# Patient Record
Sex: MALE | Race: BLACK OR AFRICAN AMERICAN | NOT HISPANIC OR LATINO | URBAN - METROPOLITAN AREA
[De-identification: names, ages, dates, MRNs, and addresses within clinical notes are randomized per-mention and may not be internally consistent; named-entity substitution may affect disease eponyms.]

---

## 2017-09-10 ENCOUNTER — INPATIENT (INPATIENT)
Facility: HOSPITAL | Age: 47
LOS: 52 days | Discharge: NURSING, REHAB OR LONG-TERM CARE | End: 2017-11-02
Attending: THORACIC SURGERY (CARDIOTHORACIC VASCULAR SURGERY) | Admitting: THORACIC SURGERY (CARDIOTHORACIC VASCULAR SURGERY)
Payer: MEDICAID

## 2017-09-10 ENCOUNTER — EMERGENCY (EMERGENCY)
Facility: HOSPITAL | Age: 47
LOS: 1 days | Discharge: HOME | End: 2017-09-10
Attending: EMERGENCY MEDICINE | Admitting: EMERGENCY MEDICINE
Payer: MEDICAID

## 2017-09-10 VITALS
RESPIRATION RATE: 22 BRPM | OXYGEN SATURATION: 98 % | SYSTOLIC BLOOD PRESSURE: 151 MMHG | HEART RATE: 100 BPM | DIASTOLIC BLOOD PRESSURE: 109 MMHG | TEMPERATURE: 98 F

## 2017-09-10 VITALS
OXYGEN SATURATION: 97 % | RESPIRATION RATE: 22 BRPM | TEMPERATURE: 99 F | DIASTOLIC BLOOD PRESSURE: 100 MMHG | HEART RATE: 85 BPM | SYSTOLIC BLOOD PRESSURE: 150 MMHG

## 2017-09-10 VITALS
HEART RATE: 82 BPM | SYSTOLIC BLOOD PRESSURE: 127 MMHG | RESPIRATION RATE: 18 BRPM | DIASTOLIC BLOOD PRESSURE: 94 MMHG | OXYGEN SATURATION: 97 %

## 2017-09-10 LAB
ALBUMIN SERPL ELPH-MCNC: 4.2 G/DL — SIGNIFICANT CHANGE UP (ref 3.3–5)
ALP SERPL-CCNC: 68 U/L — SIGNIFICANT CHANGE UP (ref 40–120)
ALT FLD-CCNC: 16 U/L — SIGNIFICANT CHANGE UP (ref 4–41)
AST SERPL-CCNC: 21 U/L — SIGNIFICANT CHANGE UP (ref 4–40)
BASE EXCESS BLDV CALC-SCNC: 1.6 MMOL/L — SIGNIFICANT CHANGE UP
BASOPHILS # BLD AUTO: 0.01 K/UL — SIGNIFICANT CHANGE UP (ref 0–0.2)
BASOPHILS # BLD AUTO: 0.04 K/UL — SIGNIFICANT CHANGE UP (ref 0–0.2)
BASOPHILS NFR BLD AUTO: 0.2 % — SIGNIFICANT CHANGE UP (ref 0–2)
BASOPHILS NFR BLD AUTO: 0.6 % — SIGNIFICANT CHANGE UP (ref 0–2)
BILIRUB SERPL-MCNC: 0.4 MG/DL — SIGNIFICANT CHANGE UP (ref 0.2–1.2)
BLOOD GAS VENOUS - CREATININE: 1.06 MG/DL — SIGNIFICANT CHANGE UP (ref 0.5–1.3)
BUN SERPL-MCNC: 14 MG/DL — SIGNIFICANT CHANGE UP (ref 7–23)
BUN SERPL-MCNC: 15 MG/DL — SIGNIFICANT CHANGE UP (ref 7–23)
CALCIUM SERPL-MCNC: 9.2 MG/DL — SIGNIFICANT CHANGE UP (ref 8.4–10.5)
CALCIUM SERPL-MCNC: 9.8 MG/DL — SIGNIFICANT CHANGE UP (ref 8.4–10.5)
CHLORIDE BLDV-SCNC: 103 MMOL/L — SIGNIFICANT CHANGE UP (ref 96–108)
CHLORIDE SERPL-SCNC: 100 MMOL/L — SIGNIFICANT CHANGE UP (ref 98–107)
CHLORIDE SERPL-SCNC: 99 MMOL/L — SIGNIFICANT CHANGE UP (ref 98–107)
CO2 SERPL-SCNC: 23 MMOL/L — SIGNIFICANT CHANGE UP (ref 22–31)
CO2 SERPL-SCNC: 28 MMOL/L — SIGNIFICANT CHANGE UP (ref 22–31)
CREAT SERPL-MCNC: 1.06 MG/DL — SIGNIFICANT CHANGE UP (ref 0.5–1.3)
CREAT SERPL-MCNC: 1.21 MG/DL — SIGNIFICANT CHANGE UP (ref 0.5–1.3)
EOSINOPHIL # BLD AUTO: 0 K/UL — SIGNIFICANT CHANGE UP (ref 0–0.5)
EOSINOPHIL # BLD AUTO: 0.19 K/UL — SIGNIFICANT CHANGE UP (ref 0–0.5)
EOSINOPHIL NFR BLD AUTO: 0 % — SIGNIFICANT CHANGE UP (ref 0–6)
EOSINOPHIL NFR BLD AUTO: 2.9 % — SIGNIFICANT CHANGE UP (ref 0–6)
GAS PNL BLDV: 138 MMOL/L — SIGNIFICANT CHANGE UP (ref 136–146)
GLUCOSE BLDV-MCNC: 149 — HIGH (ref 70–99)
GLUCOSE SERPL-MCNC: 145 MG/DL — HIGH (ref 70–99)
GLUCOSE SERPL-MCNC: 92 MG/DL — SIGNIFICANT CHANGE UP (ref 70–99)
HCO3 BLDV-SCNC: 25 MMOL/L — SIGNIFICANT CHANGE UP (ref 20–27)
HCT VFR BLD CALC: 41 % — SIGNIFICANT CHANGE UP (ref 39–50)
HCT VFR BLD CALC: 42.4 % — SIGNIFICANT CHANGE UP (ref 39–50)
HCT VFR BLDV CALC: 43.6 % — SIGNIFICANT CHANGE UP (ref 39–51)
HGB BLD-MCNC: 13.4 G/DL — SIGNIFICANT CHANGE UP (ref 13–17)
HGB BLD-MCNC: 13.8 G/DL — SIGNIFICANT CHANGE UP (ref 13–17)
HGB BLDV-MCNC: 14.2 G/DL — SIGNIFICANT CHANGE UP (ref 13–17)
IMM GRANULOCYTES # BLD AUTO: 0.01 # — SIGNIFICANT CHANGE UP
IMM GRANULOCYTES # BLD AUTO: 0.01 # — SIGNIFICANT CHANGE UP
IMM GRANULOCYTES NFR BLD AUTO: 0.2 % — SIGNIFICANT CHANGE UP (ref 0–1.5)
IMM GRANULOCYTES NFR BLD AUTO: 0.2 % — SIGNIFICANT CHANGE UP (ref 0–1.5)
LACTATE BLDV-MCNC: 1.7 MMOL/L — SIGNIFICANT CHANGE UP (ref 0.5–2)
LYMPHOCYTES # BLD AUTO: 0.88 K/UL — LOW (ref 1–3.3)
LYMPHOCYTES # BLD AUTO: 1.75 K/UL — SIGNIFICANT CHANGE UP (ref 1–3.3)
LYMPHOCYTES # BLD AUTO: 15.9 % — SIGNIFICANT CHANGE UP (ref 13–44)
LYMPHOCYTES # BLD AUTO: 26.3 % — SIGNIFICANT CHANGE UP (ref 13–44)
MAGNESIUM SERPL-MCNC: 1.8 MG/DL — SIGNIFICANT CHANGE UP (ref 1.6–2.6)
MCHC RBC-ENTMCNC: 28.1 PG — SIGNIFICANT CHANGE UP (ref 27–34)
MCHC RBC-ENTMCNC: 28.9 PG — SIGNIFICANT CHANGE UP (ref 27–34)
MCHC RBC-ENTMCNC: 32.5 % — SIGNIFICANT CHANGE UP (ref 32–36)
MCHC RBC-ENTMCNC: 32.7 % — SIGNIFICANT CHANGE UP (ref 32–36)
MCV RBC AUTO: 86.4 FL — SIGNIFICANT CHANGE UP (ref 80–100)
MCV RBC AUTO: 88.6 FL — SIGNIFICANT CHANGE UP (ref 80–100)
MONOCYTES # BLD AUTO: 0.03 K/UL — SIGNIFICANT CHANGE UP (ref 0–0.9)
MONOCYTES # BLD AUTO: 0.68 K/UL — SIGNIFICANT CHANGE UP (ref 0–0.9)
MONOCYTES NFR BLD AUTO: 0.5 % — LOW (ref 2–14)
MONOCYTES NFR BLD AUTO: 10.2 % — SIGNIFICANT CHANGE UP (ref 2–14)
NEUTROPHILS # BLD AUTO: 3.98 K/UL — SIGNIFICANT CHANGE UP (ref 1.8–7.4)
NEUTROPHILS # BLD AUTO: 4.6 K/UL — SIGNIFICANT CHANGE UP (ref 1.8–7.4)
NEUTROPHILS NFR BLD AUTO: 59.8 % — SIGNIFICANT CHANGE UP (ref 43–77)
NEUTROPHILS NFR BLD AUTO: 83.2 % — HIGH (ref 43–77)
NRBC # FLD: 0 — SIGNIFICANT CHANGE UP
NRBC # FLD: 0 — SIGNIFICANT CHANGE UP
PCO2 BLDV: 44 MMHG — SIGNIFICANT CHANGE UP (ref 41–51)
PH BLDV: 7.39 PH — SIGNIFICANT CHANGE UP (ref 7.32–7.43)
PLATELET # BLD AUTO: 141 K/UL — LOW (ref 150–400)
PLATELET # BLD AUTO: 146 K/UL — LOW (ref 150–400)
PMV BLD: 12.2 FL — SIGNIFICANT CHANGE UP (ref 7–13)
PMV BLD: 12.5 FL — SIGNIFICANT CHANGE UP (ref 7–13)
PO2 BLDV: 59 MMHG — HIGH (ref 35–40)
POTASSIUM BLDV-SCNC: 4.4 MMOL/L — SIGNIFICANT CHANGE UP (ref 3.4–4.5)
POTASSIUM SERPL-MCNC: 4.5 MMOL/L — SIGNIFICANT CHANGE UP (ref 3.5–5.3)
POTASSIUM SERPL-MCNC: 4.7 MMOL/L — SIGNIFICANT CHANGE UP (ref 3.5–5.3)
POTASSIUM SERPL-SCNC: 4.5 MMOL/L — SIGNIFICANT CHANGE UP (ref 3.5–5.3)
POTASSIUM SERPL-SCNC: 4.7 MMOL/L — SIGNIFICANT CHANGE UP (ref 3.5–5.3)
PROT SERPL-MCNC: 9.1 G/DL — HIGH (ref 6–8.3)
RBC # BLD: 4.63 M/UL — SIGNIFICANT CHANGE UP (ref 4.2–5.8)
RBC # BLD: 4.91 M/UL — SIGNIFICANT CHANGE UP (ref 4.2–5.8)
RBC # FLD: 12.9 % — SIGNIFICANT CHANGE UP (ref 10.3–14.5)
RBC # FLD: 12.9 % — SIGNIFICANT CHANGE UP (ref 10.3–14.5)
SAO2 % BLDV: 88.7 % — HIGH (ref 60–85)
SODIUM SERPL-SCNC: 138 MMOL/L — SIGNIFICANT CHANGE UP (ref 135–145)
SODIUM SERPL-SCNC: 139 MMOL/L — SIGNIFICANT CHANGE UP (ref 135–145)
TROPONIN T SERPL-MCNC: < 0.06 NG/ML — SIGNIFICANT CHANGE UP (ref 0–0.06)
TROPONIN T SERPL-MCNC: < 0.06 NG/ML — SIGNIFICANT CHANGE UP (ref 0–0.06)
WBC # BLD: 5.53 K/UL — SIGNIFICANT CHANGE UP (ref 3.8–10.5)
WBC # BLD: 6.65 K/UL — SIGNIFICANT CHANGE UP (ref 3.8–10.5)
WBC # FLD AUTO: 5.53 K/UL — SIGNIFICANT CHANGE UP (ref 3.8–10.5)
WBC # FLD AUTO: 6.65 K/UL — SIGNIFICANT CHANGE UP (ref 3.8–10.5)

## 2017-09-10 PROCEDURE — 70496 CT ANGIOGRAPHY HEAD: CPT | Mod: 26

## 2017-09-10 PROCEDURE — 99285 EMERGENCY DEPT VISIT HI MDM: CPT

## 2017-09-10 PROCEDURE — 71020: CPT | Mod: 26

## 2017-09-10 PROCEDURE — 70498 CT ANGIOGRAPHY NECK: CPT | Mod: 26,52

## 2017-09-10 RX ORDER — AZITHROMYCIN 500 MG/1
1 TABLET, FILM COATED ORAL
Qty: 5 | Refills: 0 | OUTPATIENT
Start: 2017-09-10 | End: 2017-09-15

## 2017-09-10 RX ORDER — DEXAMETHASONE 0.5 MG/5ML
10 ELIXIR ORAL ONCE
Qty: 0 | Refills: 0 | Status: COMPLETED | OUTPATIENT
Start: 2017-09-10 | End: 2017-09-10

## 2017-09-10 RX ORDER — METOCLOPRAMIDE HCL 10 MG
10 TABLET ORAL ONCE
Qty: 0 | Refills: 0 | Status: COMPLETED | OUTPATIENT
Start: 2017-09-10 | End: 2017-09-10

## 2017-09-10 RX ADMIN — Medication 10 MILLIGRAM(S): at 23:27

## 2017-09-10 RX ADMIN — Medication 10 MILLIGRAM(S): at 12:15

## 2017-09-10 NOTE — ED PROVIDER NOTE - CONSTITUTIONAL, MLM
normal... moderate discomfort, well nourished, awake, alert, oriented to person, place, time/situation

## 2017-09-10 NOTE — ED PROVIDER NOTE - NS ED ROS FT
+lightheadedness, weakness, migrating chest parasthesias, restless legs, cough, myalgias, severe headache  No CP, sob, fevers, chills, abd pain, leg swelling

## 2017-09-10 NOTE — ED PROVIDER NOTE - PMH
Asthma    COPD (chronic obstructive pulmonary disease)    History of pneumothorax  History of 3 prior pneumothoraces.  Hypertension    Sarcoidosis

## 2017-09-10 NOTE — ED PROVIDER NOTE - NEUROLOGICAL, MLM
Alert and oriented, truncal ataxia, no motor or sensory deficits. CN2-12 intact, normal speech and tone

## 2017-09-10 NOTE — ED PROVIDER NOTE - PROGRESS NOTE DETAILS
PHILLIP: 47M with copd/sarcoid, discharged from ed today for presyncope/bronchitis returns to ed with generalized feeling unwell and headache; - headache is 10/10; no photophobia/nuchal rigity/. + n, + feeling tremulous all over; no sick contracts/recent travel; PE: NAD, airway patent, +S1S2, no mrg, abdomen soft, nt nd, normal finger to nose; very gaurded gait; ecg: twave inversions v1-v3 - no prior to compare. a/p: 47m with sarcoid/copd p/w vomiting and severe headache, r/o ich/subarachoid with ct head and check for aneursysms with cta, neuro c/s; r/o acs as well- will add on ce to old and new labs, re-assess pt denies n, continues to deny chest pain; serial ecg shows what looks like new developing inversions laterally,  will get card c/s; awaiting final input from neuro as well;

## 2017-09-10 NOTE — ED ADULT NURSE NOTE - OBJECTIVE STATEMENT
pt received spot 16. pt A+Ox3 with hx of COPD (not 02 dependant) and sarcoidosis c/o increased sob x 3 days. states has had URI symptoms and believes triggered an attack. reports upon EMS arrival 02 sat was 89% on RA. received 2 neb treatments by ems and endorses improved resp effort. denies sob at present. medicated as ordered. vs as noted. IVSL in place. labs sent. will monitor.

## 2017-09-10 NOTE — ED PROVIDER NOTE - OBJECTIVE STATEMENT
47 year old male with history of sarcoidosis p/w multiple medical complaints. Patient came to ED earlier today with presyncopal episode/history of productive cough with bloody sputum. Was d/c'd with azithromycin for bronchitis and received steroid dose in the ED. Returning to ED now feeling worse with lightheadedness, weakness, lower leg shaking 47 year old male with history of sarcoidosis p/w multiple medical complaints. Patient came to ED earlier today with presyncopal episode/history of productive cough with bloody sputum. Was d/c'd with azithromycin for bronchitis and received steroid dose in the ED. Returning to ED now feeling worse with lightheadedness, weakness, lower leg shaking. Patient is now complaining of severe, sudden headache that began at approximately 9pm. Described as constant and the worst headache of his life. No nausea/vomiting, abd pain, vision changes, fevers, chills, leg swelling. No other complaints.

## 2017-09-10 NOTE — ED PROVIDER NOTE - MEDICAL DECISION MAKING DETAILS
47M history of sarcoidosis p/w severe headache, weakness, lightheadedness, ?presyncopal event.  EKG shows t wave inversions in precordial leads v1-v3. Negative delta trops. Admit to tele for new ekg changes. CTA head/neck negative for aneurysm/bleed

## 2017-09-10 NOTE — ED PROVIDER NOTE - PROGRESS NOTE DETAILS
Torrez: labs unremarkable.  pt states steroids improve sx significantly.  speaking in full sentences clearly.  rr 18.  Dx bronchitis.  will give z pack 5 days with prednisone given pt sarcoid hx. ok to use albuterol pump prn.  left ambulatory.  f/u with pcp and return if sx worsens.

## 2017-09-10 NOTE — ED PROVIDER NOTE - OBJECTIVE STATEMENT
47 yr old male with hx of sarcoidosis, COPD from 911 presents to ed c/o uri sx past 2 days with muscle spasm that last a few seconds.  Pt denies any cp, palpitations, n/v, fever, rash, abd pain.  pt denies any smoking or drug use. no leg swelling.  pt got concern because of the arm spasm without loc.  admits to using his albuterol pump frequently.

## 2017-09-10 NOTE — ED PROVIDER NOTE - MEDICAL DECISION MAKING DETAILS
47 yr old male with hx of sarcoidosis, COPD from 911 presents to ed c/o uri sx past 2 days with muscle spasm that last a few seconds.  Pt denies any cp, palpitations, n/v, fever, rash, abd pain.  pt denies any smoking or drug use. no leg swelling.  pt got concern because of the arm spasm without loc.  admits to using his albuterol pump frequently.    uri now with bronchitis vs COPD exacerbation - muscle spasm likely overuse of albuterol.  will obtain labs, cxr, decadron and dc with abx 47 yr old male with hx of sarcoidosis, COPD from 911 presents to ed c/o uri sx past 2 days with muscle spasm that last a few seconds.  Pt denies any cp, palpitations, n/v, fever, rash, abd pain.  pt denies any smoking or drug use. no leg swelling.  pt got concern because of the arm spasm without loc.  admits to using his albuterol pump frequently.    uri now with bronchitis vs COPD exacerbation - muscle spasm likely overuse of albuterol.  will obtain labs, cxr, decadron and dc with abx.  no concern for cardiac based on hx

## 2017-09-10 NOTE — ED PROVIDER NOTE - ATTENDING CONTRIBUTION TO CARE
pauline: hx from pt and his wife.   today pt was driving his car and he started shaking (unwitnessed). Came to ED (see note) and was given zpack for copd/cough worsening. At home, developed a severe diffuse headache along with fatigue, witnessed shaking of extremities for several minutes while awake, and feeling of pins and needles over body. denies any chest pain or cardiac hx. Pt was at [facility] and has COPD/sarcoid; followed at [facility].   exam: c/o headache and shaking to rt leg. alert, oriented. no resp distress.   neck supple with nontender and full rom. few crackles to bilateral posterior lung bases.   ht, abd unremarkable. neuro: str 5/5. refl 3/4 bilateral, no clonus.  nl finger to nose. pt cannot walk unassisted due to poor balance.   EKG: deeply inverted t waves v1-v3.   impression:   headache/diff walking: will obtain CT head; neurology consult.   abnormal EKG: old EKG na. if new, may be CNS, acute cardiac.   recc: cardiac monitor; troponin sent for current and earlier visit labs. if Ct head nl, will consult cardiology; current EKG not meeting acute STEMi criteria. pauline: hx from pt and his wife.   today pt was driving his car and he started shaking (unwitnessed). Came to ED (see note) and was given zpack for copd/cough worsening. At home, developed a severe diffuse headache along with fatigue, witnessed shaking of extremities for several minutes while awake, and feeling of pins and needles over body. denies any chest pain or cardiac hx. Pt was at [facility] and has COPD/sarcoid; followed at [facility].   exam: c/o headache and shaking to rt leg. alert, oriented. no resp distress.   neck supple with nontender and full rom. few crackles to bilateral posterior lung bases.   ht, abd unremarkable. neuro: str 5/5. refl 3/4 bilateral, no clonus.  nl finger to nose. pt cannot walk unassisted due to poor balance.   EKG: deeply inverted t waves v1-v3.   impression:   headache/diff walking: will obtain CT head; neurology consult.   abnormal EKG: old EKG na. if new, may be CNS, acute cardiac.   recc: cardiac monitor; troponin sent for current and earlier visit labs. if Ct head nl, will consult cardiology; current EKG not meeting acute STEMi criteria..

## 2017-09-10 NOTE — ED PROVIDER NOTE - CHIEF COMPLAINT
The patient is a 47y Male complaining of The patient is a 47y Male complaining of multiple medical complaints

## 2017-09-10 NOTE — ED ADULT TRIAGE NOTE - CHIEF COMPLAINT QUOTE
p/t seen in ED this afternoon c/o of feeling nausea, syncopal episode,  weak, flushed after taking a Zithromax, p/t appears diaphoretic directly taken to room for eval

## 2017-09-10 NOTE — ED ADULT NURSE NOTE - OBJECTIVE STATEMENT
Pt to bed 22. Pt from home. Family member states while at home pt started to have "seizure like" activity. No trauma noted. Pt c/o abd "numbness" and HA. Pt hypertensive. Pt hooked up to CM. IVL placed. Bloods drawn. Safety maintained. Will continue to monitor.

## 2017-09-10 NOTE — ED ADULT TRIAGE NOTE - CHIEF COMPLAINT QUOTE
p/t with hx sarcoidosis and chest tubes c/o of cough, wheezing and sob for past few days denies any chest pain 2 neb treatments given by EMS

## 2017-09-11 DIAGNOSIS — Z29.9 ENCOUNTER FOR PROPHYLACTIC MEASURES, UNSPECIFIED: ICD-10-CM

## 2017-09-11 DIAGNOSIS — R51 HEADACHE: ICD-10-CM

## 2017-09-11 DIAGNOSIS — R55 SYNCOPE AND COLLAPSE: ICD-10-CM

## 2017-09-11 DIAGNOSIS — B34.8 OTHER VIRAL INFECTIONS OF UNSPECIFIED SITE: ICD-10-CM

## 2017-09-11 DIAGNOSIS — J44.1 CHRONIC OBSTRUCTIVE PULMONARY DISEASE WITH (ACUTE) EXACERBATION: ICD-10-CM

## 2017-09-11 DIAGNOSIS — J44.9 CHRONIC OBSTRUCTIVE PULMONARY DISEASE, UNSPECIFIED: ICD-10-CM

## 2017-09-11 DIAGNOSIS — I10 ESSENTIAL (PRIMARY) HYPERTENSION: ICD-10-CM

## 2017-09-11 DIAGNOSIS — D86.9 SARCOIDOSIS, UNSPECIFIED: ICD-10-CM

## 2017-09-11 LAB
AMPHET UR-MCNC: NEGATIVE — SIGNIFICANT CHANGE UP
APAP SERPL-MCNC: < 15 UG/ML — LOW (ref 15–25)
APPEARANCE UR: CLEAR — SIGNIFICANT CHANGE UP
B PERT DNA SPEC QL NAA+PROBE: SIGNIFICANT CHANGE UP
BARBITURATES MEASUREMENT: NEGATIVE — SIGNIFICANT CHANGE UP
BARBITURATES UR SCN-MCNC: NEGATIVE — SIGNIFICANT CHANGE UP
BENZODIAZ SERPL-MCNC: NEGATIVE — SIGNIFICANT CHANGE UP
BENZODIAZ UR-MCNC: NEGATIVE — SIGNIFICANT CHANGE UP
BILIRUB UR-MCNC: NEGATIVE — SIGNIFICANT CHANGE UP
BLOOD UR QL VISUAL: NEGATIVE — SIGNIFICANT CHANGE UP
BUN SERPL-MCNC: 15 MG/DL — SIGNIFICANT CHANGE UP (ref 7–23)
C PNEUM DNA SPEC QL NAA+PROBE: NOT DETECTED — SIGNIFICANT CHANGE UP
CALCIUM SERPL-MCNC: 9.4 MG/DL — SIGNIFICANT CHANGE UP (ref 8.4–10.5)
CANNABINOIDS UR-MCNC: NEGATIVE — SIGNIFICANT CHANGE UP
CHLORIDE SERPL-SCNC: 99 MMOL/L — SIGNIFICANT CHANGE UP (ref 98–107)
CHOLEST SERPL-MCNC: 201 MG/DL — HIGH (ref 120–199)
CK MB BLD-MCNC: 1.84 NG/ML — SIGNIFICANT CHANGE UP (ref 1–6.6)
CK MB BLD-MCNC: SIGNIFICANT CHANGE UP (ref 0–2.5)
CK SERPL-CCNC: 144 U/L — SIGNIFICANT CHANGE UP (ref 30–200)
CO2 SERPL-SCNC: 26 MMOL/L — SIGNIFICANT CHANGE UP (ref 22–31)
COCAINE METAB.OTHER UR-MCNC: NEGATIVE — SIGNIFICANT CHANGE UP
COLOR SPEC: YELLOW — SIGNIFICANT CHANGE UP
CREAT SERPL-MCNC: 1.06 MG/DL — SIGNIFICANT CHANGE UP (ref 0.5–1.3)
ETHANOL BLD-MCNC: < 10 MG/DL — SIGNIFICANT CHANGE UP
FLUAV H1 2009 PAND RNA SPEC QL NAA+PROBE: NOT DETECTED — SIGNIFICANT CHANGE UP
FLUAV H1 RNA SPEC QL NAA+PROBE: NOT DETECTED — SIGNIFICANT CHANGE UP
FLUAV H3 RNA SPEC QL NAA+PROBE: NOT DETECTED — SIGNIFICANT CHANGE UP
FLUAV SUBTYP SPEC NAA+PROBE: SIGNIFICANT CHANGE UP
FLUBV RNA SPEC QL NAA+PROBE: NOT DETECTED — SIGNIFICANT CHANGE UP
GLUCOSE SERPL-MCNC: 121 MG/DL — HIGH (ref 70–99)
GLUCOSE UR-MCNC: NEGATIVE — SIGNIFICANT CHANGE UP
HADV DNA SPEC QL NAA+PROBE: NOT DETECTED — SIGNIFICANT CHANGE UP
HCOV 229E RNA SPEC QL NAA+PROBE: NOT DETECTED — SIGNIFICANT CHANGE UP
HCOV HKU1 RNA SPEC QL NAA+PROBE: NOT DETECTED — SIGNIFICANT CHANGE UP
HCOV NL63 RNA SPEC QL NAA+PROBE: NOT DETECTED — SIGNIFICANT CHANGE UP
HCOV OC43 RNA SPEC QL NAA+PROBE: NOT DETECTED — SIGNIFICANT CHANGE UP
HCT VFR BLD CALC: 41 % — SIGNIFICANT CHANGE UP (ref 39–50)
HDLC SERPL-MCNC: 100 MG/DL — HIGH (ref 35–55)
HGB BLD-MCNC: 13.3 G/DL — SIGNIFICANT CHANGE UP (ref 13–17)
HMPV RNA SPEC QL NAA+PROBE: NOT DETECTED — SIGNIFICANT CHANGE UP
HPIV1 RNA SPEC QL NAA+PROBE: NOT DETECTED — SIGNIFICANT CHANGE UP
HPIV2 RNA SPEC QL NAA+PROBE: NOT DETECTED — SIGNIFICANT CHANGE UP
HPIV3 RNA SPEC QL NAA+PROBE: NOT DETECTED — SIGNIFICANT CHANGE UP
HPIV4 RNA SPEC QL NAA+PROBE: NOT DETECTED — SIGNIFICANT CHANGE UP
KETONES UR-MCNC: SIGNIFICANT CHANGE UP
LEUKOCYTE ESTERASE UR-ACNC: NEGATIVE — SIGNIFICANT CHANGE UP
LIPID PNL WITH DIRECT LDL SERPL: 113 MG/DL — SIGNIFICANT CHANGE UP
M PNEUMO DNA SPEC QL NAA+PROBE: NOT DETECTED — SIGNIFICANT CHANGE UP
MCHC RBC-ENTMCNC: 28.2 PG — SIGNIFICANT CHANGE UP (ref 27–34)
MCHC RBC-ENTMCNC: 32.4 % — SIGNIFICANT CHANGE UP (ref 32–36)
MCV RBC AUTO: 86.9 FL — SIGNIFICANT CHANGE UP (ref 80–100)
METHADONE UR-MCNC: NEGATIVE — SIGNIFICANT CHANGE UP
MUCOUS THREADS # UR AUTO: SIGNIFICANT CHANGE UP
NITRITE UR-MCNC: NEGATIVE — SIGNIFICANT CHANGE UP
NRBC # FLD: 0 — SIGNIFICANT CHANGE UP
OPIATES UR-MCNC: NEGATIVE — SIGNIFICANT CHANGE UP
OXYCODONE UR-MCNC: NEGATIVE — SIGNIFICANT CHANGE UP
PCP UR-MCNC: NEGATIVE — SIGNIFICANT CHANGE UP
PH UR: 6 — SIGNIFICANT CHANGE UP (ref 4.6–8)
PLATELET # BLD AUTO: 155 K/UL — SIGNIFICANT CHANGE UP (ref 150–400)
PMV BLD: 12.8 FL — SIGNIFICANT CHANGE UP (ref 7–13)
POTASSIUM SERPL-MCNC: 4.3 MMOL/L — SIGNIFICANT CHANGE UP (ref 3.5–5.3)
POTASSIUM SERPL-SCNC: 4.3 MMOL/L — SIGNIFICANT CHANGE UP (ref 3.5–5.3)
PROT UR-MCNC: 10 — SIGNIFICANT CHANGE UP
RBC # BLD: 4.72 M/UL — SIGNIFICANT CHANGE UP (ref 4.2–5.8)
RBC # FLD: 13 % — SIGNIFICANT CHANGE UP (ref 10.3–14.5)
RBC CASTS # UR COMP ASSIST: SIGNIFICANT CHANGE UP (ref 0–?)
RSV RNA SPEC QL NAA+PROBE: NOT DETECTED — SIGNIFICANT CHANGE UP
RV+EV RNA SPEC QL NAA+PROBE: POSITIVE — HIGH
SALICYLATES SERPL-MCNC: < 5 MG/DL — LOW (ref 15–30)
SODIUM SERPL-SCNC: 140 MMOL/L — SIGNIFICANT CHANGE UP (ref 135–145)
SP GR SPEC: 1.03 — SIGNIFICANT CHANGE UP (ref 1–1.03)
TRIGL SERPL-MCNC: 40 MG/DL — SIGNIFICANT CHANGE UP (ref 10–149)
TROPONIN T SERPL-MCNC: < 0.06 NG/ML — SIGNIFICANT CHANGE UP (ref 0–0.06)
TSH SERPL-MCNC: 1.69 UIU/ML — SIGNIFICANT CHANGE UP (ref 0.27–4.2)
UROBILINOGEN FLD QL: NORMAL E.U. — SIGNIFICANT CHANGE UP (ref 0.1–0.2)
WBC # BLD: 10.25 K/UL — SIGNIFICANT CHANGE UP (ref 3.8–10.5)
WBC # FLD AUTO: 10.25 K/UL — SIGNIFICANT CHANGE UP (ref 3.8–10.5)
WBC UR QL: SIGNIFICANT CHANGE UP (ref 0–?)

## 2017-09-11 PROCEDURE — 99223 1ST HOSP IP/OBS HIGH 75: CPT

## 2017-09-11 PROCEDURE — 71250 CT THORAX DX C-: CPT | Mod: 26

## 2017-09-11 PROCEDURE — 99222 1ST HOSP IP/OBS MODERATE 55: CPT | Mod: GC

## 2017-09-11 RX ORDER — AZITHROMYCIN 500 MG/1
500 TABLET, FILM COATED ORAL DAILY
Qty: 0 | Refills: 0 | Status: COMPLETED | OUTPATIENT
Start: 2017-09-11 | End: 2017-09-13

## 2017-09-11 RX ORDER — BACLOFEN 100 %
10 POWDER (GRAM) MISCELLANEOUS ONCE
Qty: 0 | Refills: 0 | Status: COMPLETED | OUTPATIENT
Start: 2017-09-11 | End: 2017-09-11

## 2017-09-11 RX ORDER — ENOXAPARIN SODIUM 100 MG/ML
40 INJECTION SUBCUTANEOUS EVERY 24 HOURS
Qty: 0 | Refills: 0 | Status: DISCONTINUED | OUTPATIENT
Start: 2017-09-11 | End: 2017-09-19

## 2017-09-11 RX ORDER — ATORVASTATIN CALCIUM 80 MG/1
80 TABLET, FILM COATED ORAL AT BEDTIME
Qty: 0 | Refills: 0 | Status: DISCONTINUED | OUTPATIENT
Start: 2017-09-12 | End: 2017-11-02

## 2017-09-11 RX ORDER — CLOPIDOGREL BISULFATE 75 MG/1
75 TABLET, FILM COATED ORAL DAILY
Qty: 0 | Refills: 0 | Status: DISCONTINUED | OUTPATIENT
Start: 2017-09-12 | End: 2017-09-12

## 2017-09-11 RX ORDER — ASPIRIN/CALCIUM CARB/MAGNESIUM 324 MG
81 TABLET ORAL DAILY
Qty: 0 | Refills: 0 | Status: DISCONTINUED | OUTPATIENT
Start: 2017-09-12 | End: 2017-09-12

## 2017-09-11 RX ORDER — ACETAMINOPHEN 500 MG
1000 TABLET ORAL ONCE
Qty: 0 | Refills: 0 | Status: COMPLETED | OUTPATIENT
Start: 2017-09-11 | End: 2017-09-11

## 2017-09-11 RX ORDER — ASPIRIN/CALCIUM CARB/MAGNESIUM 324 MG
324 TABLET ORAL ONCE
Qty: 0 | Refills: 0 | Status: COMPLETED | OUTPATIENT
Start: 2017-09-11 | End: 2017-09-11

## 2017-09-11 RX ORDER — LOSARTAN POTASSIUM 100 MG/1
25 TABLET, FILM COATED ORAL DAILY
Qty: 0 | Refills: 0 | Status: DISCONTINUED | OUTPATIENT
Start: 2017-09-11 | End: 2017-09-13

## 2017-09-11 RX ORDER — TIOTROPIUM BROMIDE 18 UG/1
1 CAPSULE ORAL; RESPIRATORY (INHALATION) DAILY
Qty: 0 | Refills: 0 | Status: DISCONTINUED | OUTPATIENT
Start: 2017-09-11 | End: 2017-09-13

## 2017-09-11 RX ORDER — IPRATROPIUM/ALBUTEROL SULFATE 18-103MCG
3 AEROSOL WITH ADAPTER (GRAM) INHALATION EVERY 6 HOURS
Qty: 0 | Refills: 0 | Status: DISCONTINUED | OUTPATIENT
Start: 2017-09-11 | End: 2017-09-12

## 2017-09-11 RX ORDER — BACLOFEN 100 %
10 POWDER (GRAM) MISCELLANEOUS
Qty: 0 | Refills: 0 | Status: DISCONTINUED | OUTPATIENT
Start: 2017-09-11 | End: 2017-09-12

## 2017-09-11 RX ORDER — BUDESONIDE AND FORMOTEROL FUMARATE DIHYDRATE 160; 4.5 UG/1; UG/1
2 AEROSOL RESPIRATORY (INHALATION)
Qty: 0 | Refills: 0 | Status: DISCONTINUED | OUTPATIENT
Start: 2017-09-11 | End: 2017-09-13

## 2017-09-11 RX ORDER — CLOPIDOGREL BISULFATE 75 MG/1
300 TABLET, FILM COATED ORAL ONCE
Qty: 0 | Refills: 0 | Status: COMPLETED | OUTPATIENT
Start: 2017-09-11 | End: 2017-09-11

## 2017-09-11 RX ADMIN — Medication 125 MILLIGRAM(S): at 08:28

## 2017-09-11 RX ADMIN — Medication 20 MILLIGRAM(S): at 14:53

## 2017-09-11 RX ADMIN — Medication 324 MILLIGRAM(S): at 04:16

## 2017-09-11 RX ADMIN — Medication 10 MILLIGRAM(S): at 08:39

## 2017-09-11 RX ADMIN — AZITHROMYCIN 500 MILLIGRAM(S): 500 TABLET, FILM COATED ORAL at 12:19

## 2017-09-11 RX ADMIN — Medication 400 MILLIGRAM(S): at 02:10

## 2017-09-11 RX ADMIN — Medication 1000 MILLIGRAM(S): at 04:16

## 2017-09-11 RX ADMIN — LOSARTAN POTASSIUM 25 MILLIGRAM(S): 100 TABLET, FILM COATED ORAL at 12:19

## 2017-09-11 RX ADMIN — ENOXAPARIN SODIUM 40 MILLIGRAM(S): 100 INJECTION SUBCUTANEOUS at 11:40

## 2017-09-11 RX ADMIN — Medication 10 MILLIGRAM(S): at 17:32

## 2017-09-11 RX ADMIN — CLOPIDOGREL BISULFATE 300 MILLIGRAM(S): 75 TABLET, FILM COATED ORAL at 04:16

## 2017-09-11 RX ADMIN — Medication 3 MILLILITER(S): at 08:28

## 2017-09-11 RX ADMIN — Medication 3 MILLILITER(S): at 17:32

## 2017-09-11 NOTE — H&P ADULT - ASSESSMENT
47 year old male with a PMHx of sarcoidosis, HTN, asthma, and COPD presents to the ED with severe leg spasms and headache leading to a presyncopal episode. 47 year old male with a PMHx of sarcoidosis, HTN, asthma, and COPD presents to the ED with severe leg spasms and headache leading to a presyncopal episode, found to have a significant abnormal EKG and moderate inspiratory and expiratory wheezing in the setting of Entero/Rhino virus, admitted to tele for Near Syncope, r/o ACS and COPD exacerbation.

## 2017-09-11 NOTE — H&P ADULT - NEGATIVE MUSCULOSKELETAL SYMPTOMS
no arm pain R/no arthralgia/no stiffness/no joint swelling/no back pain/no leg pain L/no myalgia/no neck pain/no arm pain L/no leg pain R

## 2017-09-11 NOTE — H&P ADULT - NEGATIVE GASTROINTESTINAL SYMPTOMS
no melena/no hematochezia/no jaundice/no abdominal pain/no nausea/no diarrhea/no vomiting/no constipation/no change in bowel habits

## 2017-09-11 NOTE — CONSULT NOTE ADULT - SUBJECTIVE AND OBJECTIVE BOX
HISTORY OF PRESENT ILLNESS:  Patient is a 47y old  Male who presents with a chief complaint of   HPI: 47 year old male with history of sarcoidosis p/w multiple medical complaints. Patient came to ED earlier yesterday with presyncopal episode/history of productive cough with bloody sputum. Was d/c'd with azithromycin for bronchitis and received steroid dose in the ED. At home, developed a severe diffuse headache along with fatigue, witnessed shaking of extremities for several minutes while awake, and feeling of pins and needles over body. Returning to ED now feeling worse with lightheadedness, weakness, lower leg shaking. Denies any chest pain or cardiac hx.  CT Angio Neck showed no acute intracranial hemorrhage, mass  effect, or CT evidence of an acute vascular territorial infarct. No  abnormal intracranial enhancement. CTA head: No significant stenosis or occlusion of the major proximal  arterial branches. No evidence of an intracranial arterial aneurysm. CTA NECK: No significant stenosis of the cervical carotid arteries based  on NASCET criteria. No significant stenosis of the cervical vertebral  arteries. Severe scarring and bronchiectasis in the lung apices and  mildly prominent right paratracheal lymph nodes. Cardiology consulted for dynamic EKG changes.     Allergies  No Known Allergies    MEDICATIONS  acetaminophen  IVPB. 1000 milliGRAM(s) IV Intermittent onc    PAST MEDICAL & SURGICAL HISTORY:    Sarcoidosis    FAMILY HISTORY:      SOCIAL HISTORY    Marital Status:   Occupation:   Lives with:     SUBSTANCE USE  Tobacco Usage:  ( ) never smoked   ( ) former smoker  ( ) current smoker; Packs per day:   Alcohol Usage: ( ) none  ( ) occasional ( ) 2-3 times a week ( ) daily; Last drink:   Recreational drugs ( ) None    REVIEW OF SYSTEMS:    CONSTITUTIONAL: No fevers, No chills, + fatigue, No weight gain  EYES: No vision changes   ENT: No congestion, No ear pain, No sore throat.  NECK: No pain, No stiffness  RESPIRATORY: No shortness of breath, + cough, + productive sputum, No wheezing, No hemoptysis  CARDIOVASCULAR: No chest pain. No palpitations, No MEYER, No orthopnea, No paroxysmal nocturnal dyspnea, No pleuritic pain  GASTROINTESTINAL: No abdominal pain, No nausea, No vomiting, No hematemesis, No diarrhea No constipation. No melena  GENITOURINARY: No dysuria, No frequency, No incontinence, No hematuria  NEUROLOGICAL: No dizziness, No lightheadedness, + pre syncope, No LOC, + headache, No numbness or weakness, + shaking of extremities, + paresthesias  MUSCULOSKELETAL: No joint pain, No joint swelling.  PSYCHIATRIC: No anxiety, No depression  SKIN: No diaphoresis. No itching, No rashes, No pressure ulcers    All other review of systems is negative unless indicated above.    VITAL SIGNS  T(C): 37 (09-11-17 @ 01:25), Max: 37.2 (09-10-17 @ 11:43)  HR: 76 (09-11-17 @ 01:25) (74 - 100)  BP: 130/80 (09-11-17 @ 01:25) (127/94 - 163/108)  RR: 20 (09-11-17 @ 01:25) (18 - 22)  SpO2: 100% (09-11-17 @ 01:25) (97% - 100%)  Wt(kg): --    PHYSICAL EXAM:    Appearance: NAD, no distress  HEENT:   Normal oral mucosa, PERRL, EOMI  Cardiovascular: Regular rate and rhythm, Normal S1 S2, No JVD, No murmurs, No edema  Respiratory: Lungs clear to auscultation. No rales, No rhonchi, No wheezing. No tenderness to palpation  Gastrointestinal:  Soft, Non-tender, + BS  Neurologic: Non-focal, A&Ox3  Skin: Warm and dry, No rashes, No ecchymoses, No cyanosis  Extremities: No clubbing, cyanosis or edema  Vascular: Peripheral pulses palpable 2+ bilaterally  Psychiatry: Mood & affect appropriate    LABORATORY VALUES	 	                        13.8   5.53  )-----------( 146      ( 10 Sep 2017 19:51 )             42.4     09-10    138  |  100  |  14  ----------------------------<  145<H>  4.7   |  23  |  1.06  09-10    139  |  99  |  15  ----------------------------<  92  4.5   |  28  |  1.21    Ca    9.8      10 Sep 2017 19:51  Ca    9.2      10 Sep 2017 11:57  Mg     1.8     09-10  TPro  9.1<H>  /  Alb  4.2  /  TBili  0.4  /  DBili  x   /  AST  21  /  ALT  16  /  AlkPhos  68  09-10    LIVER FUNCTIONS - ( 10 Sep 2017 19:51 )  Alb: 4.2 g/dL / Pro: 9.1 g/dL / ALK PHOS: 68 u/L / ALT: 16 u/L / AST: 21 u/L / GGT: x           CARDIAC MARKERS:  Troponin T, Serum: < 0.06 ng/mL (09-10 @ 19:51)  Troponin T, Serum: < 0.06 ng/mL (09-10 @ 11:57)  Blood Gas Venous - Lactate: 1.7 mmol/L (09-10 @ 19:51)    TELEMETRY: 	    ECG:  	  RADIOLOGY:  OTHER: 	    PREVIOUS DIAGNOSTIC TESTING:    [ ] Echocardiogram:  [ ] Catheterization:  [ ] Stress Test:  	    ASSESSMENT AND PLAN      PLAN  	      # [id number] HISTORY OF PRESENT ILLNESS:  Patient is a 47y old  Male who presents with a chief complaint of   HPI: 47 year old male with history of HTN, COPD, asthma, sarcoidosis, ? pneumothorax required chest tube x 3 in past, last in 2016 p/w multiple medical complaints. Patient came to ED earlier yesterday with presyncopal episode/history of productive cough with bloody sputum. Was d/c'd with azithromycin for bronchitis and received steroid dose in the ED. At home, developed a severe diffuse headache along with fatigue, witnessed shaking of extremities for several minutes while awake, and feeling of pins and needles over body. Returning to ED now feeling worse with lightheadedness, weakness, lower leg shaking. Denies any chest pain or cardiac hx. Stated he had cold and cough for 3 days, was driving around 2 pm, felt shaking allover, not feeling well, lightheaded. Episode recurred half an hour later. Came to ER. + chills yesterday. + nausea, + poor appetite. CT Angio Neck showed no acute intracranial hemorrhage, mass  effect, or CT evidence of an acute vascular territorial infarct. No  abnormal intracranial enhancement. CTA head: No significant stenosis or occlusion of the major proximal  arterial branches. No evidence of an intracranial arterial aneurysm. CTA NECK: No significant stenosis of the cervical carotid arteries based  on NASCET criteria. No significant stenosis of the cervical vertebral  arteries. Severe scarring and bronchiectasis in the lung apices and  mildly prominent right paratracheal lymph nodes. Cardiology consulted for dynamic EKG changes. Patient denies chest pain, SOB, palpitation, orthopnea, PND, vomiting, diarrhea, constipation, abdominal pain, bladder and bowel problems, leg swelling, sick contact, recent travel.    Allergies  No Known Allergies    MEDICATIONS  acetaminophen  IVPB. 1000 milliGRAM(s) IV Intermittent onc    PAST MEDICAL & SURGICAL HISTORY:    Sarcoidosis  HTN  COPD/Asthma  ?pneumothorax x 3    FAMILY HISTORY:   Non contributary    SOCIAL HISTORY    Marital Status: single  Occupation: disabled  Lives with: alone    SUBSTANCE USE  Tobacco Usage:  ( x) never smoked   ( ) former smoker  ( ) current smoker; Packs per day:   Alcohol Usage: (x ) none  ( ) occasional ( ) 2-3 times a week ( ) daily; Last drink:   Recreational drugs (x ) None    REVIEW OF SYSTEMS:    CONSTITUTIONAL: No fevers, + chills, + fatigue, No weight gain  EYES: No vision changes   ENT: + congestion, No ear pain, No sore throat.  NECK: No pain, No stiffness  RESPIRATORY: No shortness of breath, + cough, + productive sputum, + wheezing, No hemoptysis  CARDIOVASCULAR: No chest pain. No palpitations, No MEYER, No orthopnea, No paroxysmal nocturnal dyspnea, No pleuritic pain  GASTROINTESTINAL: No abdominal pain, + nausea, No vomiting, No hematemesis, No diarrhea No constipation. No melena  GENITOURINARY: No dysuria, No frequency, No incontinence, No hematuria  NEUROLOGICAL: + dizziness, + lightheadedness, + pre syncope, No LOC, + headache, No numbness or weakness, + shaking of extremities, + paresthesias  MUSCULOSKELETAL: No joint pain, No joint swelling.  PSYCHIATRIC: No anxiety, No depression  SKIN: No diaphoresis. No itching, No rashes, No pressure ulcers    All other review of systems is negative unless indicated above.    VITAL SIGNS  T(C): 37 (09-11-17 @ 01:25), Max: 37.2 (09-10-17 @ 11:43)  HR: 76 (09-11-17 @ 01:25) (74 - 100)  BP: 130/80 (09-11-17 @ 01:25) (127/94 - 163/108)  RR: 20 (09-11-17 @ 01:25) (18 - 22)  SpO2: 100% (09-11-17 @ 01:25) (97% - 100%)  Wt(kg): --    PHYSICAL EXAM:    Appearance: NAD, no distress  HEENT:   Normal oral mucosa, PERRL, EOMI  Cardiovascular: Regular rate and rhythm, Normal S1 S2, No JVD, No murmurs, No edema  Respiratory: No rales, + b/l rhonchi, + inspiratory and expiratory wheezing throughout. No tenderness to palpation  Gastrointestinal:  Soft, Non-tender, + BS  Neurologic: Non-focal, A&Ox3  Skin: Warm and dry, No rashes, No ecchymoses, No cyanosis  Extremities: No clubbing, cyanosis or edema  Vascular: Peripheral pulses palpable 2+ bilaterally  Psychiatry: Mood & affect appropriate    LABORATORY VALUES	 	                        13.8   5.53  )-----------( 146      ( 10 Sep 2017 19:51 )             42.4     09-10    138  |  100  |  14  ----------------------------<  145<H>  4.7   |  23  |  1.06  09-10    139  |  99  |  15  ----------------------------<  92  4.5   |  28  |  1.21    Ca    9.8      10 Sep 2017 19:51  Ca    9.2      10 Sep 2017 11:57  Mg     1.8     09-10  TPro  9.1<H>  /  Alb  4.2  /  TBili  0.4  /  DBili  x   /  AST  21  /  ALT  16  /  AlkPhos  68  09-10    LIVER FUNCTIONS - ( 10 Sep 2017 19:51 )  Alb: 4.2 g/dL / Pro: 9.1 g/dL / ALK PHOS: 68 u/L / ALT: 16 u/L / AST: 21 u/L / GGT: x           CARDIAC MARKERS:  Troponin T, Serum: < 0.06 ng/mL (09-10 @ 19:51)  Troponin T, Serum: < 0.06 ng/mL (09-10 @ 11:57)  Blood Gas Venous - Lactate: 1.7 mmol/L (09-10 @ 19:51)    TELEMETRY: 	    ECG:  	  RADIOLOGY:  OTHER: 	    PREVIOUS DIAGNOSTIC TESTING:    [ ] Echocardiogram:  [ ] Catheterization:  [ ] Stress Test:  	    ASSESSMENT AND PLAN      PLAN  	      # [id number] HISTORY OF PRESENT ILLNESS:  Patient is a 47y old  Male who presents with a chief complaint of   HPI: 47 year old male with history of HTN, COPD, asthma, sarcoidosis, ? pneumothorax required chest tube x 3 in past, last in 2016 p/w multiple medical complaints. Patient came to ED earlier yesterday with presyncopal episode/history of productive cough with bloody sputum. Was d/c'd with azithromycin for bronchitis and received steroid dose in the ED. At home, developed a severe diffuse headache along with fatigue, witnessed shaking of extremities for several minutes while awake, and feeling of pins and needles over body. Returning to ED feeling worse with lightheadedness, weakness, lower leg shaking. Denies any chest pain or cardiac hx. Stated he had cold and cough for 3 days, was driving around 2 pm, felt shaking allover, not feeling well, lightheaded. Episode recurred half an hour later. Came to ER. + chills yesterday. + nausea, + poor appetite. CT Angio Neck showed no acute intracranial hemorrhage, mass  effect, or CT evidence of an acute vascular territorial infarct. No  abnormal intracranial enhancement. CTA head: No significant stenosis or occlusion of the major proximal  arterial branches. No evidence of an intracranial arterial aneurysm. CTA NECK: No significant stenosis of the cervical carotid arteries based  on NASCET criteria. No significant stenosis of the cervical vertebral  arteries. Severe scarring and bronchiectasis in the lung apices and  mildly prominent right paratracheal lymph nodes. Cardiology consulted for dynamic EKG changes. Patient denies chest pain, SOB, palpitation, orthopnea, PND, vomiting, diarrhea, constipation, abdominal pain, bladder and bowel problems, leg swelling, sick contact, recent travel.   Cardiac enzymes negative. EKG showed dep TWI V1-V4 changed to TWI V1-V2, Biphasic Twaves in V3-V5.     Allergies  No Known Allergies    MEDICATIONS  acetaminophen  IVPB. 1000 milliGRAM(s) IV Intermittent onc    PAST MEDICAL & SURGICAL HISTORY:    Sarcoidosis  HTN  COPD/Asthma  ?pneumothorax x 3    FAMILY HISTORY:   Non contributory    SOCIAL HISTORY    Marital Status: single  Occupation: disabled  Lives with: alone    SUBSTANCE USE  Tobacco Usage:  ( x) never smoked   ( ) former smoker  ( ) current smoker; Packs per day:   Alcohol Usage: (x ) none  ( ) occasional ( ) 2-3 times a week ( ) daily; Last drink:   Recreational drugs (x ) None    REVIEW OF SYSTEMS:    CONSTITUTIONAL: No fevers, + chills, + fatigue, No weight gain  EYES: No vision changes   ENT: + congestion, No ear pain, No sore throat.  NECK: No pain, No stiffness  RESPIRATORY: No shortness of breath, + cough, + productive sputum, + wheezing, No hemoptysis  CARDIOVASCULAR: No chest pain. No palpitations, No MEYER, No orthopnea, No paroxysmal nocturnal dyspnea, No pleuritic pain  GASTROINTESTINAL: No abdominal pain, + nausea, No vomiting, No hematemesis, No diarrhea No constipation. No melena  GENITOURINARY: No dysuria, No frequency, No incontinence, No hematuria  NEUROLOGICAL: + dizziness, + lightheadedness, + pre syncope, No LOC, + headache, No numbness or weakness, + shaking of extremities, + paresthesias  MUSCULOSKELETAL: No joint pain, No joint swelling.  PSYCHIATRIC: No anxiety, No depression  SKIN: No diaphoresis. No itching, No rashes, No pressure ulcers    All other review of systems is negative unless indicated above.    VITAL SIGNS  T(C): 37 (09-11-17 @ 01:25), Max: 37.2 (09-10-17 @ 11:43)  HR: 76 (09-11-17 @ 01:25) (74 - 100)  BP: 130/80 (09-11-17 @ 01:25) (127/94 - 163/108)  RR: 20 (09-11-17 @ 01:25) (18 - 22)  SpO2: 100% (09-11-17 @ 01:25) (97% - 100%)  Wt(kg): --    PHYSICAL EXAM:    Appearance: NAD, no distress  HEENT:   Normal oral mucosa, PERRL, EOMI  Cardiovascular: Regular rate and rhythm, Normal S1 S2, No JVD, No murmurs, No edema  Respiratory: No rales, + b/l rhonchi, + inspiratory and expiratory wheezing throughout. No tenderness to palpation  Gastrointestinal:  Soft, Non-tender, + BS  Neurologic: Non-focal, A&Ox3  Skin: Warm and dry, No rashes, No ecchymoses, No cyanosis  Extremities: No clubbing, cyanosis or edema  Vascular: Peripheral pulses palpable 2+ bilaterally  Psychiatry: Mood & affect appropriate    LABORATORY VALUES	 	                        13.8   5.53  )-----------( 146      ( 10 Sep 2017 19:51 )             42.4     09-10    138  |  100  |  14  ----------------------------<  145<H>  4.7   |  23  |  1.06  09-10    139  |  99  |  15  ----------------------------<  92  4.5   |  28  |  1.21    Ca    9.8      10 Sep 2017 19:51  Ca    9.2      10 Sep 2017 11:57  Mg     1.8     09-10  TPro  9.1<H>  /  Alb  4.2  /  TBili  0.4  /  DBili  x   /  AST  21  /  ALT  16  /  AlkPhos  68  09-10    LIVER FUNCTIONS - ( 10 Sep 2017 19:51 )  Alb: 4.2 g/dL / Pro: 9.1 g/dL / ALK PHOS: 68 u/L / ALT: 16 u/L / AST: 21 u/L / GGT: x           CARDIAC MARKERS:  Troponin T, Serum: < 0.06 ng/mL (09-10 @ 19:51)  Troponin T, Serum: < 0.06 ng/mL (09-10 @ 11:57)  Blood Gas Venous - Lactate: 1.7 mmol/L (09-10 @ 19:51)    TELEMETRY: 	    ECG:  	  RADIOLOGY:  OTHER: 	    PREVIOUS DIAGNOSTIC TESTING:    [ ] Echocardiogram:  [ ] Catheterization:  [ ] Stress Test:  	    ASSESSMENT AND PLAN  47 year old male with history of HTN, COPD, asthma, sarcoidosis, ? pneumothorax required chest tube x 3 in past, last in 2016 p/w multiple medical complaints. Cardiology consulted for EKG changes.     PLAN    Repeat 12 lead EKG done.     # [id number] HISTORY OF PRESENT ILLNESS:  Patient is a 47y old  Male who presents with a chief complaint of   HPI: 47 year old male with history of HTN, COPD, asthma, sarcoidosis, ? pneumothorax required chest tube x 3 in past, last in 2016 p/w multiple medical complaints. Patient came to ED earlier yesterday with presyncopal episode/history of productive cough with bloody sputum. Was d/c'd with azithromycin for bronchitis and received steroid dose in the ED. At home, developed a severe diffuse headache along with fatigue, witnessed shaking of extremities for several minutes while awake, and feeling of pins and needles over body. Returning to ED feeling worse with lightheadedness, weakness, lower leg shaking. Denies any chest pain or cardiac hx. Stated he had cold and cough for 3 days, was driving around 2 pm, felt shaking allover, not feeling well, lightheaded. Episode recurred half an hour later. Came to ER. + chills yesterday. + nausea, + poor appetite. CT Angio Neck showed no acute intracranial hemorrhage, mass  effect, or CT evidence of an acute vascular territorial infarct. No  abnormal intracranial enhancement. CTA head: No significant stenosis or occlusion of the major proximal  arterial branches. No evidence of an intracranial arterial aneurysm. CTA NECK: No significant stenosis of the cervical carotid arteries based  on NASCET criteria. No significant stenosis of the cervical vertebral  arteries. Severe scarring and bronchiectasis in the lung apices and  mildly prominent right paratracheal lymph nodes. Cardiology consulted for dynamic EKG changes. Patient denies chest pain, SOB, palpitation, orthopnea, PND, vomiting, diarrhea, constipation, abdominal pain, bladder and bowel problems, leg swelling, sick contact, recent travel.   Cardiac enzymes negative. EKG showed dep TWI V1-V4 changed to TWI V1-V2, Biphasic Twaves in V3-V5.     Allergies  No Known Allergies    MEDICATIONS  Albuterol  Symbicort  Losartan  acetaminophen  IVPB. 1000 milliGRAM(s) IV Intermittent onc    PAST MEDICAL & SURGICAL HISTORY:    Sarcoidosis  HTN  COPD/Asthma  ?pneumothorax x 3    FAMILY HISTORY:   Non contributory    SOCIAL HISTORY    Marital Status: single  Occupation: disabled  Lives with: alone    SUBSTANCE USE  Tobacco Usage:  ( x) never smoked   ( ) former smoker  ( ) current smoker; Packs per day:   Alcohol Usage: (x ) none  ( ) occasional ( ) 2-3 times a week ( ) daily; Last drink:   Recreational drugs (x ) None    REVIEW OF SYSTEMS:    CONSTITUTIONAL: No fevers, + chills, + fatigue, No weight gain  EYES: No vision changes   ENT: + congestion, No ear pain, No sore throat.  NECK: No pain, No stiffness  RESPIRATORY: No shortness of breath, + cough, + productive sputum, + wheezing, No hemoptysis  CARDIOVASCULAR: No chest pain. No palpitations, No MEYER, No orthopnea, No paroxysmal nocturnal dyspnea, No pleuritic pain  GASTROINTESTINAL: No abdominal pain, + nausea, No vomiting, No hematemesis, No diarrhea No constipation. No melena  GENITOURINARY: No dysuria, No frequency, No incontinence, No hematuria  NEUROLOGICAL: + dizziness, + lightheadedness, + pre syncope, No LOC, + headache, No numbness or weakness, + shaking of extremities, + paresthesias  MUSCULOSKELETAL: No joint pain, No joint swelling.  PSYCHIATRIC: No anxiety, No depression  SKIN: No diaphoresis. No itching, No rashes, No pressure ulcers    All other review of systems is negative unless indicated above.    VITAL SIGNS  T(C): 37 (09-11-17 @ 01:25), Max: 37.2 (09-10-17 @ 11:43)  HR: 76 (09-11-17 @ 01:25) (74 - 100)  BP: 130/80 (09-11-17 @ 01:25) (127/94 - 163/108)  RR: 20 (09-11-17 @ 01:25) (18 - 22)  SpO2: 100% (09-11-17 @ 01:25) (97% - 100%)  Wt(kg): --    PHYSICAL EXAM:    Appearance: NAD, no distress  HEENT:   Normal oral mucosa, PERRL, EOMI  Cardiovascular: Regular rate and rhythm, Normal S1 S2, No JVD, No murmurs, No edema  Respiratory: No rales, + b/l rhonchi, + inspiratory and expiratory wheezing throughout. No tenderness to palpation  Gastrointestinal:  Soft, Non-tender, + BS  Neurologic: Non-focal, A&Ox3  Skin: Warm and dry, No rashes, No ecchymoses, No cyanosis  Extremities: No clubbing, cyanosis or edema  Vascular: Peripheral pulses palpable 2+ bilaterally  Psychiatry: Mood & affect appropriate    LABORATORY VALUES	 	                        13.8   5.53  )-----------( 146      ( 10 Sep 2017 19:51 )             42.4     09-10    138  |  100  |  14  ----------------------------<  145<H>  4.7   |  23  |  1.06  09-10    139  |  99  |  15  ----------------------------<  92  4.5   |  28  |  1.21    Ca    9.8      10 Sep 2017 19:51  Ca    9.2      10 Sep 2017 11:57  Mg     1.8     09-10  TPro  9.1<H>  /  Alb  4.2  /  TBili  0.4  /  DBili  x   /  AST  21  /  ALT  16  /  AlkPhos  68  09-10    LIVER FUNCTIONS - ( 10 Sep 2017 19:51 )  Alb: 4.2 g/dL / Pro: 9.1 g/dL / ALK PHOS: 68 u/L / ALT: 16 u/L / AST: 21 u/L / GGT: x           CARDIAC MARKERS:  Troponin T, Serum: < 0.06 ng/mL (09-10 @ 19:51)  Troponin T, Serum: < 0.06 ng/mL (09-10 @ 11:57)  Blood Gas Venous - Lactate: 1.7 mmol/L (09-10 @ 19:51)    TELEMETRY: 	    ECG:  	  RADIOLOGY:  OTHER: 	    PREVIOUS DIAGNOSTIC TESTING:    [ ] Echocardiogram:  [ ] Catheterization:  [ ] Stress Test:  	    ASSESSMENT AND PLAN  47 year old male with history of HTN, COPD, asthma, sarcoidosis, ? pneumothorax required chest tube x 3 in past, last in 2016 p/w multiple medical complaints. Cardiology consulted for EKG changes.     PLAN    Repeat 12 lead EKG done.     # [id number] HISTORY OF PRESENT ILLNESS:  Patient is a 47y old  Male who presents with a chief complaint of   HPI: 47 year old male with history of HTN, COPD, asthma, sarcoidosis, ? pneumothorax required chest tube x 3 in past, last in 2016 p/w multiple medical complaints. Patient came to ED earlier yesterday with presyncopal episode/history of productive cough with bloody sputum. Was d/c'd with azithromycin for bronchitis and received steroid dose in the ED. At home, developed a severe diffuse headache along with fatigue, witnessed shaking of extremities for several minutes while awake, and feeling of pins and needles over body. Returning to ED feeling worse with lightheadedness, weakness, lower leg shaking. Denies any chest pain or cardiac hx. Stated he had cold and cough for 3 days, was driving around 2 pm, felt shaking allover, not feeling well, lightheaded. Episode recurred half an hour later. Came to ER. + chills yesterday. + nausea, + poor appetite. CT Angio Neck showed no acute intracranial hemorrhage, mass  effect, or CT evidence of an acute vascular territorial infarct. No  abnormal intracranial enhancement. CTA head: No significant stenosis or occlusion of the major proximal  arterial branches. No evidence of an intracranial arterial aneurysm. CTA NECK: No significant stenosis of the cervical carotid arteries based  on NASCET criteria. No significant stenosis of the cervical vertebral  arteries. Severe scarring and bronchiectasis in the lung apices and  mildly prominent right paratracheal lymph nodes. Cardiology consulted for dynamic EKG changes. Patient denies chest pain, SOB, palpitation, orthopnea, PND, vomiting, diarrhea, constipation, abdominal pain, bladder and bowel problems, leg swelling, sick contact, recent travel.   Cardiac enzymes negative. EKG showed dep TWI V1-V4 changed to TWI V1-V2, Biphasic Twaves in V3-V5.     Allergies  No Known Allergies    MEDICATIONS  Albuterol  Symbicort  Losartan  acetaminophen  IVPB. 1000 milliGRAM(s) IV Intermittent onc    PAST MEDICAL & SURGICAL HISTORY:    Sarcoidosis  HTN  COPD/Asthma  ?pneumothorax x 3    FAMILY HISTORY:   Non contributory    SOCIAL HISTORY    Marital Status: single  Occupation: disabled  Lives with: alone    SUBSTANCE USE  Tobacco Usage:  ( x) never smoked   ( ) former smoker  ( ) current smoker; Packs per day:   Alcohol Usage: (x ) none  ( ) occasional ( ) 2-3 times a week ( ) daily; Last drink:   Recreational drugs (x ) None    REVIEW OF SYSTEMS:    CONSTITUTIONAL: No fevers, + chills, + fatigue, No weight gain  EYES: No vision changes   ENT: + congestion, No ear pain, No sore throat.  NECK: No pain, No stiffness  RESPIRATORY: No shortness of breath, + cough, + productive sputum, + wheezing, No hemoptysis  CARDIOVASCULAR: No chest pain. No palpitations, No MEYER, No orthopnea, No paroxysmal nocturnal dyspnea, No pleuritic pain  GASTROINTESTINAL: No abdominal pain, + nausea, No vomiting, No hematemesis, No diarrhea No constipation. No melena  GENITOURINARY: No dysuria, No frequency, No incontinence, No hematuria  NEUROLOGICAL: + dizziness, + lightheadedness, + pre syncope, No LOC, + headache, No numbness or weakness, + shaking of extremities, + paresthesias  MUSCULOSKELETAL: No joint pain, No joint swelling.  PSYCHIATRIC: No anxiety, No depression  SKIN: No diaphoresis. No itching, No rashes, No pressure ulcers    All other review of systems is negative unless indicated above.    VITAL SIGNS  T(C): 37 (09-11-17 @ 01:25), Max: 37.2 (09-10-17 @ 11:43)  HR: 76 (09-11-17 @ 01:25) (74 - 100)  BP: 130/80 (09-11-17 @ 01:25) (127/94 - 163/108)  RR: 20 (09-11-17 @ 01:25) (18 - 22)  SpO2: 100% (09-11-17 @ 01:25) (97% - 100%)  Wt(kg): --    PHYSICAL EXAM:    Appearance: NAD, no distress  HEENT:   Normal oral mucosa, PERRL, EOMI  Cardiovascular: Regular rate and rhythm, Normal S1 S2, No JVD, No murmurs, No edema  Respiratory: No rales, + b/l rhonchi, + inspiratory and expiratory wheezing throughout. No tenderness to palpation  Gastrointestinal:  Soft, Non-tender, + BS  Neurologic: Non-focal, A&Ox3  Skin: Warm and dry, No rashes, No ecchymoses, No cyanosis  Extremities: No clubbing, cyanosis or edema  Vascular: Peripheral pulses palpable 2+ bilaterally  Psychiatry: Mood & affect appropriate    LABORATORY VALUES	 	                        13.8   5.53  )-----------( 146      ( 10 Sep 2017 19:51 )             42.4     09-10    138  |  100  |  14  ----------------------------<  145<H>  4.7   |  23  |  1.06  09-10    139  |  99  |  15  ----------------------------<  92  4.5   |  28  |  1.21    Ca    9.8      10 Sep 2017 19:51  Ca    9.2      10 Sep 2017 11:57  Mg     1.8     09-10  TPro  9.1<H>  /  Alb  4.2  /  TBili  0.4  /  DBili  x   /  AST  21  /  ALT  16  /  AlkPhos  68  09-10    LIVER FUNCTIONS - ( 10 Sep 2017 19:51 )  Alb: 4.2 g/dL / Pro: 9.1 g/dL / ALK PHOS: 68 u/L / ALT: 16 u/L / AST: 21 u/L / GGT: x           CARDIAC MARKERS:  Troponin T, Serum: < 0.06 ng/mL (09-10 @ 19:51)  Troponin T, Serum: < 0.06 ng/mL (09-10 @ 11:57)  Blood Gas Venous - Lactate: 1.7 mmol/L (09-10 @ 19:51)    TELEMETRY: NSR	    ECG:  	  RADIOLOGY:  OTHER: 	    PREVIOUS DIAGNOSTIC TESTING:    [ ] Echocardiogram:  [ ] Catheterization:  [ ] Stress Test:  	    ASSESSMENT AND PLAN  47 year old male with history of HTN, COPD, asthma, sarcoidosis, ? pneumothorax required chest tube x 3 in past, last in 2016 p/w multiple medical complaints. Cardiology consulted for EKG changes.     PLAN    Repeat 12 lead EKG done.     # [id number] HISTORY OF PRESENT ILLNESS:  Patient is a 47y old  Male who presents with a chief complaint of   HPI: 47 year old male with history of HTN, COPD, asthma, sarcoidosis, ? pneumothorax required chest tube x 3 in past, last in 2016 p/w multiple medical complaints. Patient came to ED earlier yesterday with presyncopal episode/history of productive cough with bloody sputum. Was d/c'd with azithromycin for bronchitis and received steroid dose in the ED. At home, developed a severe diffuse headache along with fatigue, witnessed shaking of extremities for several minutes while awake, and feeling of pins and needles over body. Returning to ED feeling worse with lightheadedness, weakness, lower leg shaking. Denies any chest pain or cardiac hx. Stated he had cold and cough for 3 days, was driving around 2 pm, felt shaking allover, not feeling well, lightheaded. Episode recurred half an hour later. Came to ER. + chills yesterday. + nausea, + poor appetite. CT Angio Neck showed no acute intracranial hemorrhage, mass  effect, or CT evidence of an acute vascular territorial infarct. No  abnormal intracranial enhancement. CTA head: No significant stenosis or occlusion of the major proximal  arterial branches. No evidence of an intracranial arterial aneurysm. CTA NECK: No significant stenosis of the cervical carotid arteries based  on NASCET criteria. No significant stenosis of the cervical vertebral  arteries. Severe scarring and bronchiectasis in the lung apices and  mildly prominent right paratracheal lymph nodes. Cardiology consulted for dynamic EKG changes. Patient denies chest pain, SOB, palpitation, orthopnea, PND, vomiting, diarrhea, constipation, abdominal pain, bladder and bowel problems, leg swelling, sick contact, recent travel.   Cardiac enzymes negative. EKG showed dep TWI V1-V4 changed to TWI V1-V2, Biphasic Twaves in V3-V5.     Allergies  No Known Allergies    MEDICATIONS  Albuterol  Symbicort  Losartan  acetaminophen  IVPB. 1000 milliGRAM(s) IV Intermittent onc    PAST MEDICAL & SURGICAL HISTORY:    Sarcoidosis  HTN  COPD/Asthma  ?pneumothorax x 3    FAMILY HISTORY:   Non contributory    SOCIAL HISTORY    Marital Status: single  Occupation: disabled  Lives with: alone    SUBSTANCE USE  Tobacco Usage:  ( x) never smoked   ( ) former smoker  ( ) current smoker; Packs per day:   Alcohol Usage: (x ) none  ( ) occasional ( ) 2-3 times a week ( ) daily; Last drink:   Recreational drugs (x ) None    REVIEW OF SYSTEMS:    CONSTITUTIONAL: No fevers, + chills, + fatigue, No weight gain  EYES: No vision changes   ENT: + congestion, No ear pain, No sore throat.  NECK: No pain, No stiffness  RESPIRATORY: No shortness of breath, + cough, + productive sputum, + wheezing, No hemoptysis  CARDIOVASCULAR: No chest pain. No palpitations, No MEYER, No orthopnea, No paroxysmal nocturnal dyspnea, No pleuritic pain  GASTROINTESTINAL: No abdominal pain, + nausea, No vomiting, No hematemesis, No diarrhea No constipation. No melena  GENITOURINARY: No dysuria, No frequency, No incontinence, No hematuria  NEUROLOGICAL: + dizziness, + lightheadedness, + pre syncope, No LOC, + headache, No numbness or weakness, + shaking of extremities, + paresthesias  MUSCULOSKELETAL: No joint pain, No joint swelling.  PSYCHIATRIC: No anxiety, No depression  SKIN: No diaphoresis. No itching, No rashes, No pressure ulcers    All other review of systems is negative unless indicated above.    VITAL SIGNS  T(C): 37 (09-11-17 @ 01:25), Max: 37.2 (09-10-17 @ 11:43)  HR: 76 (09-11-17 @ 01:25) (74 - 100)  BP: 130/80 (09-11-17 @ 01:25) (127/94 - 163/108)  RR: 20 (09-11-17 @ 01:25) (18 - 22)  SpO2: 100% (09-11-17 @ 01:25) (97% - 100%)  Wt(kg): --    PHYSICAL EXAM:    Appearance: NAD, no distress  HEENT:   Normal oral mucosa, PERRL, EOMI  Cardiovascular: Regular rate and rhythm, Normal S1 S2, No JVD, No murmurs, No edema  Respiratory: No rales, + b/l rhonchi, + inspiratory and expiratory wheezing throughout. No tenderness to palpation  Gastrointestinal:  Soft, Non-tender, + BS  Neurologic: Non-focal, A&Ox3  Skin: Warm and dry, No rashes, No ecchymoses, No cyanosis  Extremities: No clubbing, cyanosis or edema  Vascular: Peripheral pulses palpable 2+ bilaterally  Psychiatry: Mood & affect appropriate    LABORATORY VALUES	 	                        13.8   5.53  )-----------( 146      ( 10 Sep 2017 19:51 )             42.4     09-10    138  |  100  |  14  ----------------------------<  145<H>  4.7   |  23  |  1.06  09-10    139  |  99  |  15  ----------------------------<  92  4.5   |  28  |  1.21    Ca    9.8      10 Sep 2017 19:51  Ca    9.2      10 Sep 2017 11:57  Mg     1.8     09-10  TPro  9.1<H>  /  Alb  4.2  /  TBili  0.4  /  DBili  x   /  AST  21  /  ALT  16  /  AlkPhos  68  09-10    LIVER FUNCTIONS - ( 10 Sep 2017 19:51 )  Alb: 4.2 g/dL / Pro: 9.1 g/dL / ALK PHOS: 68 u/L / ALT: 16 u/L / AST: 21 u/L / GGT: x           CARDIAC MARKERS:  Troponin T, Serum: < 0.06 ng/mL (09-10 @ 19:51)  Troponin T, Serum: < 0.06 ng/mL (09-10 @ 11:57)  Blood Gas Venous - Lactate: 1.7 mmol/L (09-10 @ 19:51)    TELEMETRY: NSR	    ECG:  	  RADIOLOGY:  OTHER: 	    PREVIOUS DIAGNOSTIC TESTING:    [ ] Echocardiogram:  [ ] Catheterization:  [ ] Stress Test:  	    ASSESSMENT AND PLAN  47 year old male with history of HTN, COPD, asthma, sarcoidosis, ? pneumothorax required chest tube x 3 in past, last in 2016 p/w multiple medical complaints. Cardiology consulted for EKG changes.     PLAN    Repeat 12 lead EKG done which showed no changes in last 1.5 hours  CE negative. Chest pain free at present. If patient c/o chest pain, [place] cardiology at [phone].   Admit to telemetry medicine. House cardiology will follow  Load with  mg now, Plavix 300 mg PO x 1. Hold heparin gtt for now. If CE positive start heparin gtt as per ACS protocol  Assess for resolution of chest pain and recurrence in chest pain. Serial EKG PRN chest pain to assess for ST changes and call cardiology at [phone]  Continuous cardiac monitoring to monitor for arrhythmias  CBC, CMP, coags, HbA1C, TSH, lipids for comorbidities, Trend cardiac enzymes q 6 h.   Aspirin 81 PO daily, Clopidogrel 75 PO daily, Lipitor 80 mg PO daily  C/W home medications   If persistent chest pain with EKG changes, will plan for emergent cath   Low fat DASH diet  ECHO: 2D ECHO to evaluate LV function and wall motion abnormalities    # [id number]

## 2017-09-11 NOTE — H&P ADULT - PROBLEM SELECTOR PLAN 2
continue losartan as previously directed   monitor BP  consider DASH diet Continuous SpO2 monitoring  DuoNebs PRN dyspnea/wheezing  Continue Symbicort, and Incruse Ellipta as previously directed.  Continue Azithromycin as previously prescribed by the ED for the acute bronchitis.   Start on prednisone 40mg po daily  consider pulmonary consult

## 2017-09-11 NOTE — H&P ADULT - HISTORY OF PRESENT ILLNESS
Patient is a 47 year old male with a PMHx of sarcoidosis, HTN, COPD, and asthma who presents to the ED with severe leg spasms. These intermittent left leg spasms began suddenly two days ago in the patient's home and have become more regular and worsening in contracture. The spasm began in the quad leading to severe erratic left leg movements that can last from seconds to minutes. Once the leg spasm and contracture stops, an "intense tingling and pain sensation" which starts in the foot and radiates up the left side of his body to his neck. Once the tingling sensation reached his neck, the patient became faint, but did not fully lose consciousness. The patient also had a HA at the time of the spasm and tingling which was diffuse throughout the head and rated a 10/10 on a pain scale. He denied ever having this severe of a HA or leg spasm prior this incident; the HA started yesterday and is still present today. Patient denies fever, chills, N/V/D, orthopnea, NPD, sputum, chest pain, leg swelling, and abdominal pain. Patient is a 47 year old male with a PMHx of sarcoidosis, HTN, COPD, and asthma who presents to the ED with severe leg spasms. These intermittent left leg spasms began suddenly two days ago in the patient's home and have become more regular and worsening in contracture. The spasm began in the quad leading to severe erratic left leg movements that can last from seconds to minutes. Once the leg spasm and contracture stops, an "intense tingling and pain sensation" which starts in the foot and radiates up the left side of his body to his neck. Once the tingling sensation reached his neck, the patient became faint, but did not fully lose consciousness. The patient also had a HA at the time of the spasm and tingling which was diffuse throughout the head and rated a 10/10 on a pain scale. He denied ever having this severe of a HA or leg spasm prior this incident; the HA started yesterday and is still present today. Patient denies fever, chills, N/V/D, orthopnea, NPD, sputum, chest pain, leg swelling, and abdominal pain.     In E.D. pt found to have an abnormal EKG, where house cardiology for consulted.   On admission pt found to have moderate inspiratory and expiratory wheezes.

## 2017-09-11 NOTE — H&P ADULT - PROBLEM SELECTOR PLAN 1
Continue albuterol, Symbicort, and Incruse Ellipta as previously directed.  Continue Azithromycin as previously prescribed by the ED for the acute bronchitis. tele monitor   cardiac enzymes x 3 neg  Orthostatic blood pressures  DASH   House cards constulted  continue ASA, plavix  started on Lipitor 80mg po qhs

## 2017-09-11 NOTE — H&P ADULT - NEUROLOGICAL DETAILS
sensation intact/responds to verbal commands/alert and oriented x 3 responds to pain/alert and oriented x 3/sensation intact/responds to verbal commands

## 2017-09-11 NOTE — CONSULT NOTE ADULT - SUBJECTIVE AND OBJECTIVE BOX
Neurology Consult    Name: OSCAR CHACKO    HPI:  46 yo AA man who presents to the ED w/ presyncopal episode/history of productive cough with bloody sputum. Was d/c'd with azithromycin for bronchitis and received steroid dose in the ED. Returning to ED now feeling worse with lightheadedness, weakness, lower leg shaking	    MEDICATIONS (home):   * Patient Currently Takes Medications as of 10-Sep-2017 13:42 documented in Structured Notes  · 	Azithromycin 5 Day Dose Pack 250 mg oral tablet: 1 dose(s) orally once a day for respiratory infection  · 	predniSONE 50 mg oral tablet: 1 tab(s) orally once a day     Allergies: No Known Allergies    Objective:   Vital Signs Last 24 Hrs  T(C): 36.7 (10 Sep 2017 22:34), Max: 37.2 (10 Sep 2017 11:43)  T(F): 98 (10 Sep 2017 22:34), Max: 98.9 (10 Sep 2017 11:43)  HR: 76 (10 Sep 2017 22:34) (74 - 100)  BP: 160/100 (10 Sep 2017 22:34) (127/94 - 163/108)  RR: 20 (10 Sep 2017 22:34) (18 - 22)  SpO2: 100% (10 Sep 2017 22:34) (97% - 100%)    General Exam:   General appearance: No acute distress                   Neurological Exam:  Mental Status: AAOx3, fluent speech, follows commands    Cranial Nerves: EOMI, PERRL, V1-V3 intact, facial symmetry intact, no dysarthria, tongue midline, VFF    Motor: 5/5 throughout. No drift x4    Sensation: Intact to LT throughout    Coordination: FTN intact b/l    Reflexes: 1+ bilateral biceps, brachioradialis, patellar and ankle    Gait: normal and stable.      Labs:    09-10    138  |  100  |  14  ----------------------------<  145<H>  4.7   |  23  |  1.06    Ca    9.8      10 Sep 2017 19:51  Mg     1.8     09-10    TPro  9.1<H>  /  Alb  4.2  /  TBili  0.4  /  DBili  x   /  AST  21  /  ALT  16  /  AlkPhos  68  09-10    LIVER FUNCTIONS - ( 10 Sep 2017 19:51 )  Alb: 4.2 g/dL / Pro: 9.1 g/dL / ALK PHOS: 68 u/L / ALT: 16 u/L / AST: 21 u/L / GGT: x           CBC Full  -  ( 10 Sep 2017 19:51 )  WBC Count : 5.53 K/uL  Hemoglobin : 13.8 g/dL  Hematocrit : 42.4 %  Platelet Count - Automated : 146 K/uL  Mean Cell Volume : 86.4 fL  Mean Cell Hemoglobin : 28.1 pg  Mean Cell Hemoglobin Concentration : 32.5 %  Auto Neutrophil # : 4.60 K/uL  Auto Lymphocyte # : 0.88 K/uL  Auto Monocyte # : 0.03 K/uL  Auto Eosinophil # : 0.00 K/uL  Auto Basophil # : 0.01 K/uL  Auto Neutrophil % : 83.2 %  Auto Lymphocyte % : 15.9 %  Auto Monocyte % : 0.5 %  Auto Eosinophil % : 0.0 %  Auto Basophil % : 0.2 %      Radiology    < from: CT Angio Neck w/ IV Cont (09.10.17 @ 21:41) >  IMPRESSION:  CT head: Motion degraded exam. No acute intracranial hemorrhage, mass   effect, or CT evidence of an acute vascular territorial infarct. No   abnormal intracranial enhancement.    CTA head: No significant stenosis or occlusion of the major proximal   arterial branches. No evidence of an intracranial arterial aneurysm.    CTA NECK: No significant stenosis of the cervical carotid arteries based   on NASCET criteria. No significant stenosis of the cervical vertebral   arteries. Severe scarring and bronchiectasis in the lung apices and   mildly prominent right paratracheal lymph nodes.    < end of copied text > Neurology Consult    Name: OSCAR CHACKO    HPI:  48 yo AA man who presents to the ED w/ presyncopal episode/history of productive cough. Was d/c'd with azithromycin for bronchitis and received steroid dose in the ED. Returning to ED now feeling worse with lightheadedness, headache, imbalance when standing up. 	    PMH:  Sarcoidosis     MEDICATIONS (home):   * Patient Currently Takes Medications as of 10-Sep-2017 13:42 documented in Structured Notes  · 	Azithromycin 5 Day Dose Pack 250 mg oral tablet: 1 dose(s) orally once a day for respiratory infection  · 	predniSONE 50 mg oral tablet: 1 tab(s) orally once a day     Allergies: No Known Allergies    Objective:   Vital Signs Last 24 Hrs  T(C): 36.7 (10 Sep 2017 22:34), Max: 37.2 (10 Sep 2017 11:43)  T(F): 98 (10 Sep 2017 22:34), Max: 98.9 (10 Sep 2017 11:43)  HR: 76 (10 Sep 2017 22:34) (74 - 100)  BP: 160/100 (10 Sep 2017 22:34) (127/94 - 163/108)  RR: 20 (10 Sep 2017 22:34) (18 - 22)  SpO2: 100% (10 Sep 2017 22:34) (97% - 100%)    General Exam:   General appearance: mild to moderate acute distress                   Neurological Exam:  Mental Status: AAOx3, fluent speech, follows commands    Cranial Nerves: EOMI, PERRL, V1-V3 intact, facial symmetry intact, no dysarthria, tongue midline, VFF    Motor: 5/5 throughout. No drift x4    Sensation: Intact to LT throughout    Coordination: FTN intact b/l    Reflexes: 1+ bilateral biceps, brachioradialis, patellar and ankle    Gait: poor postural stability, ataxic gait     Other: no nuchal rigidity, negative Kernig's Brudzinski     Labs:    09-10    138  |  100  |  14  ----------------------------<  145<H>  4.7   |  23  |  1.06    Ca    9.8      10 Sep 2017 19:51  Mg     1.8     09-10    TPro  9.1<H>  /  Alb  4.2  /  TBili  0.4  /  DBili  x   /  AST  21  /  ALT  16  /  AlkPhos  68  09-10    LIVER FUNCTIONS - ( 10 Sep 2017 19:51 )  Alb: 4.2 g/dL / Pro: 9.1 g/dL / ALK PHOS: 68 u/L / ALT: 16 u/L / AST: 21 u/L / GGT: x           CBC Full  -  ( 10 Sep 2017 19:51 )  WBC Count : 5.53 K/uL  Hemoglobin : 13.8 g/dL  Hematocrit : 42.4 %  Platelet Count - Automated : 146 K/uL  Mean Cell Volume : 86.4 fL  Mean Cell Hemoglobin : 28.1 pg  Mean Cell Hemoglobin Concentration : 32.5 %  Auto Neutrophil # : 4.60 K/uL  Auto Lymphocyte # : 0.88 K/uL  Auto Monocyte # : 0.03 K/uL  Auto Eosinophil # : 0.00 K/uL  Auto Basophil # : 0.01 K/uL  Auto Neutrophil % : 83.2 %  Auto Lymphocyte % : 15.9 %  Auto Monocyte % : 0.5 %  Auto Eosinophil % : 0.0 %  Auto Basophil % : 0.2 %      Radiology    < from: CT Angio Neck w/ IV Cont (09.10.17 @ 21:41) >  IMPRESSION:  CT head: Motion degraded exam. No acute intracranial hemorrhage, mass   effect, or CT evidence of an acute vascular territorial infarct. No   abnormal intracranial enhancement.    CTA head: No significant stenosis or occlusion of the major proximal   arterial branches. No evidence of an intracranial arterial aneurysm.    CTA NECK: No significant stenosis of the cervical carotid arteries based   on NASCET criteria. No significant stenosis of the cervical vertebral   arteries. Severe scarring and bronchiectasis in the lung apices and   mildly prominent right paratracheal lymph nodes.    < end of copied text >

## 2017-09-11 NOTE — CONSULT NOTE ADULT - PROBLEM SELECTOR RECOMMENDATION 2
has extensive wheezing at this time: would change to IV steroids: it is likely due to rhinovirus infection:

## 2017-09-11 NOTE — H&P ADULT - NSHPSOCIALHISTORY_GEN_ALL_CORE
Patient denies any history of smoking, drinking alcohol, or participating in recreational drugs such as cocaine, heroin, or marijuana. Patient is  and lives at home with his wife.

## 2017-09-11 NOTE — H&P ADULT - NEGATIVE ALLERGY TYPES
no reactions to animals/no outdoor environmental allergies/no indoor environmental allergies/no reactions to medicines/no reactions to food/no reactions to insect bites

## 2017-09-11 NOTE — H&P ADULT - NSHPLABSRESULTS_GEN_ALL_CORE
EKG shows T wave abnormality showing anterior ischemia with a normal sinus rhythm. EKG: NSR @ 70 bpm with TWI in V1-V4 and Poor R-wave progression in V1-V6.  Long x2: neg  RVP+ Entero/Rhino virus  CT head: Motion degraded exam. No acute intracranial hemorrhage, mass   effect, or CT evidence of an acute vascular territorial infarct. No   abnormal intracranial enhancement.    CTA head: No significant stenosis or occlusion of the major proximal   arterial branches. No evidence of an intracranial arterial aneurysm.    CTA NECK: No significant stenosis of the cervical carotid arteries based   on NASCET criteria. No significant stenosis of the cervical vertebral   arteries. Severe scarring and bronchiectasis in the lung apices and   mildly prominent right paratracheal lymph nodes.

## 2017-09-11 NOTE — CONSULT NOTE ADULT - ASSESSMENT
46 yo AA man w/ a PMH of Sarcoidosis, who presents to the ED w/ presyncopal episode/history of productive cough. Was d/c'd with azithromycin for bronchitis and received steroid dose in the ED. Returning to ED now feeling worse with lightheadedness, headache, imbalance when standing up. 48 yo AA man w/ a PMH of Sarcoidosis, who presents to the ED w/ presyncopal episode/history of productive cough. Was d/c'd with azithromycin for bronchitis and received steroid dose in the ED. Returning to ED now feeling worse with lightheadedness, headache, imbalance when standing up along with abnormal twitching and muscle spasms in his bilateral lower extremities.

## 2017-09-11 NOTE — H&P ADULT - NEGATIVE ENMT SYMPTOMS
no nasal congestion/no sinus symptoms/no vertigo/no ear pain/no hearing difficulty/no post-nasal discharge/no tinnitus

## 2017-09-11 NOTE — ED ADULT NURSE REASSESSMENT NOTE - NS ED NURSE REASSESS COMMENT FT1
Pt c/o of left leg spasm he was medicated as odored pt denies chest pain or sob respiration are even unlabored course breath sounds noted no wheezing present.
Pt is resting well no complaints of chest pain, sob or left leg discomfort at this time. Pt is noted with course breath sounds he was medicated with a respiratory treatment as ordered. Cardiac monitor NSR no ectopy noted.
Report received From CHARLIE Mcfadden pt is alert and oriented x 3 pt denies chest pain at this time he is noted with bilateral wheezing on ascultation cm NSR  pt was given his first respiratory treatment as ordered. Pt skin is intact no c/o of abdominal pain pt c/o of left leg discomfort NP is aware and is here to evaluate the pt.

## 2017-09-11 NOTE — CONSULT NOTE ADULT - SUBJECTIVE AND OBJECTIVE BOX
History of Present Illness:  Chief Complaint/Reason for Admission: "My leg wouldn't stop shaking."	  History of Present Illness: 	  Patient is a 47 year old male with a PMHx of sarcoidosis, HTN, COPD, and asthma who presents to the ED with severe leg spasms. These intermittent left leg spasms began suddenly two days ago in the patient's home and have become more regular and worsening in contracture. The spasm began in the quad leading to severe erratic left leg movements that can last from seconds to minutes. Once the leg spasm and contracture stops, an "intense tingling and pain sensation" which starts in the foot and radiates up the left side of his body to his neck. Once the tingling sensation reached his neck, the patient became faint, but did not fully lose consciousness. The patient also had a HA at the time of the spasm and tingling which was diffuse throughout the head and rated a 10/10 on a pain scale. He denied ever having this severe of a HA or leg spasm prior this incident; the HA started yesterday and is still present today. Patient denies fever, chills, N/V/D, orthopnea, NPD, sputum, chest pain, leg swelling, and abdominal pain.     In E.D. pt found to have an abnormal EKG, where house cardiology for consulted.   On admission pt found to have moderate inspiratory and expiratory wheezes.   Patient is a 47 year old male with a PMHx of sarcoidosis, HTN, COPD, and asthma who presents to the ED with severe leg spasms. These intermittent left leg spasms began suddenly two days ago in the patient's home and have become more regular and worsening in contracture. The spasm began in the quad leading to severe erratic left leg movements that can last from seconds to minutes. Once the leg spasm and contracture stops, an "intense tingling and pain sensation" which starts in the foot and radiates up the left side of his body to his neck. Once the tingling sensation reached his neck, the patient became faint, but did not fully lose consciousness. The patient also had a HA at the time of the spasm and tingling which was diffuse throughout the head and rated a 10/10 on a pain scale. He denied ever having this severe of a HA or leg spasm prior this incident; the HA started yesterday and is still present today. Patient denies fever, chills, N/V/D, orthopnea, NPD, sputum, chest pain, leg swelling, and abdominal pain.      Review of Systems:  · Negative General Symptoms	no fever; no chills; no sweating; no weight loss	  · Negative Skin Symptoms	no rash; no itching; no tumor; no pitted nails	  · Skin Symptoms	dryness	  · Negative Ophthalmologic Symptoms	no diplopia; no photophobia; no blurred vision L; no blurred vision R; no pain L; no pain R	  · Negative ENMT Symptoms	no hearing difficulty; no ear pain; no tinnitus; no vertigo; no sinus symptoms; no nasal congestion; no post-nasal discharge	  · Negative Respiratory and Thorax Symptoms	no pleuritic chest pain	  · Respiratory and Thorax Symptoms	wheezing  dyspnea  cough	  · Negative Cardiovascular Symptoms	no chest pain; no palpitations; no orthopnea; no paroxysmal nocturnal dyspnea; no peripheral edema	  · Cardiovascular Symptoms	dyspnea on exertion	  · Negative Gastrointestinal Symptoms	no nausea; no vomiting; no diarrhea; no constipation; no change in bowel habits; no abdominal pain; no melena; no hematochezia; no jaundice	  · Negative Musculoskeletal Symptoms	no arthralgia; no joint swelling; no myalgia; no stiffness; no neck pain; no arm pain L; no arm pain R; no back pain; no leg pain L; no leg pain R	  · Musculoskeletal Symptoms	muscle cramps; muscle weakness	  · Negative Neurological Symptoms	no weakness	  · Neurological Symptoms	weakness; syncope; tremors; headache	  · Negative Psychiatric Symptoms	no depression; no anxiety; no insomnia; no memory loss	  · Negative Hematology Symptoms	no gum bleeding; no nose bleeding; no skin lumps	  · Negative Lymphatic Symptoms	no enlarged lymph nodes; no tender lymph nodes; no swelling of extremity	  · Negative Endocrine Symptoms	no cold intolerance; no heat intolerance; no voice change	  · Negative Allergic Reactions	no anaphylaxis; no respiratory distress; no photosensitivity; no urticaria	  · Negative Allergy Types	no outdoor environmental allergies; no indoor environmental allergies; no reactions to medicines; no reactions to food; no reactions to animals; no reactions to insect bites	  · Negative Immunological Symptoms	no recurring infections; no recurring pneumonia	      Allergies and Intolerances:        Allergies:  	No Known Allergies:     Home Medications:   * Incomplete Medication History as of 11-Sep-2017 09:23 documented in Structured Notes  · 	Azithromycin 5 Day Dose Pack 250 mg oral tablet: 1 dose(s) orally once a day for respiratory infection, Last Dose Taken:    · 	predniSONE 50 mg oral tablet: 1 tab(s) orally once a day , Last Dose Taken:    · 	albuterol 1.25 mg/3 mL (0.042%) inhalation solution: 3 milliliter(s) inhaled 3 times a day, Last Dose Taken:    · 	losartan 25 mg oral tablet: 1 tab(s) orally 2 times a day, Last Dose Taken:    · 	Symbicort 160 mcg-4.5 mcg/inh inhalation aerosol: 2 puff(s) inhaled 2 times a day, Last Dose Taken:    · 	Incruse Ellipta: 1 puff(s) inhaled once a day, Last Dose Taken:      Patient History:   Past Medical History:  Asthma    COPD (chronic obstructive pulmonary disease)    History of pneumothorax  History of 3 prior pneumothoraces.  Hypertension    Sarcoidosis.    Past Surgical History:  No significant past surgical history.    Family History:  Father  Still living? No  Family history of essential hypertension, Age at diagnosis: Age Unknown  Family history of pancreatic cancer, Age at diagnosis: Age Unknown.    Social History:  Social History (marital status, living situation, occupation, tobacco use, alcohol and drug use, and sexual history): Patient denies any history of smoking, drinking alcohol, or participating in recreational drugs such as cocaine, heroin, or marijuana. Patient is  and lives at home with his wife.	    Tobacco Screening:  · Core Measure Site	No	  · Has the patient used tobacco in the past 30 days?	No	    Risk Assessment:   Present on Admission:  Deep Venous Thrombosis	no	  Pulmonary Embolus	no	  Urinary Catheter	no	  Central Venous Catheter/PICC Line	no	  Surgical Site Incision	no	  Pressure Ulcer(s)	no	    Heart Failure:  Does this patient have a history of or has been diagnosed with heart failure? no.    HIV Screen (per [facility] Department of Health, HIV screening must be offered to every individual between ages 13 and 64)	Offered and patient declined	      Height/Weight/BSA/BMI:  · Height (FEET)	5 Feet	  · Height (INCHES)	11 Inch(s)	  · Height (CENTIMETERS)	180.34 Centimeter(s)	  · 	 used	  · Dosing Weight (KILOGRAMS)	97.1 kg	  · Dosing Weight  (POUNDS)	214 Pound(s)	  · BSA (m2)	2.17 Meter Squared	  · BMI (kG/m2)	29.9	    Physical Exam:  · Constitutional	detailed exam	  · Constitutional Details	well-developed; well-groomed; no distress	  	 well-developed; well-groomed 	  · Eyes	detailed exam	  · Eyes Details	PERRL; EOMI	  · ENMT	detailed exam	  · ENMT Details	ear L; ear R; nose; mouth	  · External Ear L	normal	  · TMJ L	normal	  · Ear Canal L	normal	  · External Ear R	normal	  · TMJ R	normal	  · Ear Canal R	normal	  · Nose	normal; no discharge	  · Mouth	normal mouth and gums; moist	  · Neck	detailed exam	  · Neck Details	normal; supple; no JVD	  · Breasts	No masses; no nipple discharge	  · Back	detailed exam	  · Back Details	normal; normal shape	  · Respiratory	detailed exam	  · Respiratory Details	no chest wall tenderness; respirations labored; wheezes	  · Wheezes	upper L; upper R	  · Cardiovascular	detailed exam	  · Cardiovascular Details	regular rate and rhythm  no rub  no murmur	  · Cardiovascular Details	positive S1; positive S2	  · Gastrointestinal	detailed exam	  · GI Normal	normal; soft; nontender; no distention; no bruit	  · Genitourinary	not examined 	  · Rectal	not examined 	  · Extremities	detailed exam	  · Extremities Details	normal; no clubbing; no cyanosis; no pedal edema	  · Vascular	detailed exam	  · Carotid Pulse	right normal; left normal	  · Radial Pulse	right normal; left normal	  · DP Pulse	right normal; left normal	  · Neurological	detailed exam	  · Neurological Details	alert and oriented x 3; responds to pain; responds to verbal commands; sensation intact	  	 alert and oriented x 3; responds to verbal commands; sensation intact 	  · Skin	detailed exam	  · Skin Details	warm and dry; color normal	  · Lymph Nodes	detailed exam	  · Lymphatic Details	posterior cervical L; posterior cervical R; anterior cervical L; anterior cervical R	  · Posterior Cervical L	normal	  · Posterior Cervical R	normal	  · Anterior Cervical L	normal	  · Anterior Cervical R	normal	  · Musculoskeletal	detailed exam	  · Musculoskeletal Details	normal; no joint swelling; no joint erythema; no joint warmth; no calf tenderness	  · Psychiatric	detailed exam	  · Psychiatric Details	normal affect; normal behavior	      Labs and Results:  Labs, Radiology, Cardiology, and Other Results: EKG: NSR @ 70 bpm with TWI in V1-V4 and Poor R-wave progression in V1-V6.  Long x2: neg  RVP+ Entero/Rhino virus  CT head: Motion degraded exam. No acute intracranial hemorrhage, mass   effect, or CT evidence of an acute vascular territorial infarct. No   abnormal intracranial enhancement.   CTA head: No significant stenosis or occlusion of the major proximal   arterial branches. No evidence of an intracranial arterial aneurysm.   CTA NECK: No significant stenosis of the cervical carotid arteries based   on NASCET criteria. No significant stenosis of the cervical vertebral   arteries. Severe scarring and bronchiectasis in the lung apices and  mildly prominent right paratracheal lymph nodes.

## 2017-09-11 NOTE — CONSULT NOTE ADULT - SUBJECTIVE AND OBJECTIVE BOX
I have seen and examined the patient and history noted: Pt is  responder and has sarcoidosis, asthma as well as copd with history of pneumothroax x 3 on the right side treated with chest tube came in with following symptoms as described below as well as feeling intensely dizzy at the end of coughing paroxysm. He has carlton wheezing extensively and currently feels better then when he came in first.   years ago, he had takes steroids for many months for sarcoidosis.     Patient is a 47y old  Male who presents with a chief complaint of "My leg wouldn't stop shaking." (11 Sep 2017 07:22)      HPI:  Patient is a 47 year old male with a PMHx of sarcoidosis, HTN, COPD, and asthma who presents to the ED with severe leg spasms. These intermittent left leg spasms began suddenly two days ago in the patient's home and have become more regular and worsening in contracture. The spasm began in the quad leading to severe erratic left leg movements that can last from seconds to minutes. Once the leg spasm and contracture stops, an "intense tingling and pain sensation" which starts in the foot and radiates up the left side of his body to his neck. Once the tingling sensation reached his neck, the patient became faint, but did not fully lose consciousness. The patient also had a HA at the time of the spasm and tingling which was diffuse throughout the head and rated a 10/10 on a pain scale. He denied ever having this severe of a HA or leg spasm prior this incident; the HA started yesterday and is still present today. Patient denies fever, chills, N/V/D, orthopnea, NPD, sputum, chest pain, leg swelling, and abdominal pain.     In E.D. pt found to have an abnormal EKG, where house cardiology for consulted.   On admission pt found to have moderate inspiratory and expiratory wheezes. (11 Sep 2017 07:22)      ?FOLLOWING PRESENT  [x ] Hx of PE/DVT, [ x] Hx COPD, [x ] Hx of Asthma, [x ] Hx of Hospitalization, [x ]  Hx of BiPAP/CPAP use, [x ] Hx of AURELIA    Allergies    No Known Allergies    Intolerances        PAST MEDICAL & SURGICAL HISTORY:  History of pneumothorax: History of 3 prior pneumonthoracies.  COPD (chronic obstructive pulmonary disease)  Asthma  Hypertension  Sarcoidosis  No significant past surgical history      FAMILY HISTORY:  Family history of pancreatic cancer (Father)  Family history of essential hypertension (Father)      Social History: [ x ] TOBACCO                  [ x ] ETOH                                 [ x ] IVDA/DRUGS    REVIEW OF SYSTEMS      General:	x    Skin/Breast:x  	  Ophthalmologic:x  	  ENMT:	x    Respiratory and Thorax: cough, sob, wheezing  	  Cardiovascular:	x    Gastrointestinal:	x    Genitourinary:	x    Musculoskeletal:	x    Neurological:	dizzy    Psychiatric:	x    Hematology/Lymphatics:	x    Endocrine:	x    Allergic/Immunologic:	x    MEDICATIONS  (STANDING):  tiotropium 18 MICROgram(s) Capsule 1 Capsule(s) Inhalation daily  buDESOnide 160 MICROgram(s)/formoterol 4.5 MICROgram(s) Inhaler 2 Puff(s) Inhalation two times a day  enoxaparin Injectable 40 milliGRAM(s) SubCutaneous every 24 hours  azithromycin   Tablet 500 milliGRAM(s) Oral daily  losartan 25 milliGRAM(s) Oral daily  predniSONE   Tablet 20 milliGRAM(s) Oral daily  baclofen 10 milliGRAM(s) Oral two times a day    MEDICATIONS  (PRN):  ALBUTerol/ipratropium for Nebulization 3 milliLiter(s) Nebulizer every 6 hours PRN Shortness of Breath and/or Wheezing  diazepam    Tablet 5 milliGRAM(s) Oral every 12 hours PRN muscle spasm       Vital Signs Last 24 Hrs  T(C): 36.6 (11 Sep 2017 07:38), Max: 37 (11 Sep 2017 01:25)  T(F): 97.8 (11 Sep 2017 07:38), Max: 98.6 (11 Sep 2017 01:25)  HR: 84 (11 Sep 2017 11:23) (74 - 100)  BP: 132/91 (11 Sep 2017 11:23) (128/87 - 163/108)  BP(mean): --  RR: 19 (11 Sep 2017 11:23) (16 - 22)  SpO2: 96% (11 Sep 2017 11:23) (96% - 100%)        I&O's Summary      Physical Exam:   GENERAL: NAD, well-groomed, well-developed  HEENT: GIOVANI/   Atraumatic, Normocephalic  ENMT: No tonsillar erythema, exudates, or enlargement; Moist mucous membranes, Good dentition, No lesions  NECK: Supple, No JVD, Normal thyroid  CHEST/LUNG: extensive bilateral wheezing++  CVS: Regular rate and rhythm; No murmurs, rubs, or gallops  GI: : Soft, Nontender, Nondistended; Bowel sounds present  NERVOUS SYSTEM:  Alert & Oriented X3  EXTREMITIES:  2+ Peripheral Pulses, No clubbing, cyanosis, or edema  LYMPH: No lymphadenopathy noted  SKIN: No rashes or lesions  ENDOCRINOLOGY: No Thyromegaly  PSYCH: Appropriate    Labs:  1.6<44<4>>59<<7.395>>1.6<<3><<4><<5<<599>>  CARDIAC MARKERS ( 11 Sep 2017 02:00 )  x     / < 0.06 ng/mL / 144 u/L / 1.84 ng/mL / x      CARDIAC MARKERS ( 10 Sep 2017 19:51 )  x     / < 0.06 ng/mL / x     / x     / x      CARDIAC MARKERS ( 10 Sep 2017 11:57 )  x     / < 0.06 ng/mL / x     / x     / x                                13.3   10.25 )-----------( 155      ( 11 Sep 2017 11:38 )             41.0                         13.8   5.53  )-----------( 146      ( 10 Sep 2017 19:51 )             42.4                         13.4   6.65  )-----------( 141      ( 10 Sep 2017 11:57 )             41.0     09-11    140  |  99  |  15  ----------------------------<  121<H>  4.3   |  26  |  1.06  09-10    138  |  100  |  14  ----------------------------<  145<H>  4.7   |  23  |  1.06  09-10    139  |  99  |  15  ----------------------------<  92  4.5   |  28  |  1.21    Ca    9.4      11 Sep 2017 11:38  Ca    9.8      10 Sep 2017 19:51  Ca    9.2      10 Sep 2017 11:57  Mg     1.8     10    TPro  9.1<H>  /  Alb  4.2  /  TBili  0.4  /  DBili  x   /  AST  21  /  ALT  16  /  AlkPhos  68  09-10    CAPILLARY BLOOD GLUCOSE        LIVER FUNCTIONS - ( 10 Sep 2017 19:51 )  Alb: 4.2 g/dL / Pro: 9.1 g/dL / ALK PHOS: 68 u/L / ALT: 16 u/L / AST: 21 u/L / GGT: x             Urinalysis Basic - ( 11 Sep 2017 09:34 )    Color: YELLOW / Appearance: CLEAR / S.029 / pH: 6.0  Gluc: NEGATIVE / Ketone: TRACE  / Bili: NEGATIVE / Urobili: NORMAL E.U.   Blood: NEGATIVE / Protein: 10 / Nitrite: NEGATIVE   Leuk Esterase: NEGATIVE / RBC: 0-2 / WBC 0-2   Sq Epi: x / Non Sq Epi: x / Bacteria: x      D DImer    Cultures:                       Rapid Respiratory Viral Panel Result         @ 02:00  Rapid RVP --  Coronovirus --  Adenovirus --  Bordetella Pertussis --  Chlamydia Pneumonia --  Entero/RhinovirusPOSITIVE  HKU1 Coronovirus --  HMPV Coronovirus --  Influenza A --  Influenza AH1 --  Influenza AH1 2009 --  Influenza AH3 --  Influenza B --  Mycoplasma Pneumoniae --  NL63 Coronovirus --  OC43 Coronovirus --  Parainfluenza 1 --  Parainfluenza 2 --  Parainfluenza 3 --  Parainfluenza 4 --  Resp Syncytial Virus --        Studies  Chest X-RAY  CT SCAN Chest   CT Abdomen  Venous Dopplers: LE:   Others    < from: CT Angio Neck w/ IV Cont (09.10.17 @ 21:41) >  CTA HEAD:  The skull base and intracranial carotid arteries are patent without   significant stenosis. The proximal ACAs and MCAs are patent without   significant stenosis. There is a short segment anterior communicating   artery. Distal RAUL and MCA branches are grossly symmetric.    The skull base and intradural vertebral arteries and basilar are artery   are patent without significant stenosis. The proximal PCAs are patent   without significant stenosis. The posterior communicating arteries are   hypoplastic. The superior cerebellar artery origins, a left AICA, and   bilateral PICAs are identified.    There is no evidence of an intracranial arterial aneurysm.      IMPRESSION:  CT head: Motion degraded exam. No acute intracranial hemorrhage, mass   effect, or CT evidence of an acute vascular territorial infarct. No   abnormal intracranial enhancement.    CTA head: No significant stenosis or occlusion of the major proximal   arterial branches. No evidence of an intracranial arterial aneurysm.    CTA NECK: No significant stenosis of the cervical carotid arteries based   on NASCET criteria. No significant stenosis of the cervical vertebral   arteries. Severe scarring and bronchiectasis in the lung apices and   mildly prominent right paratracheal lymph nodes.    < end of copied text >  < from: Xray Chest 2 Views PA/Lat (09.10.17 @ 12:30) >  EXAM:  RAD CHEST PA LAT        PROCEDURE DATE:  Sep 10 2017         INTERPRETATION:    CLINICAL INDICATION: Cough, shortness of breath, dizziness. History of   COPD and sarcoidosis.    TECHNIQUE: PA and lateral views of the chest.    COMPARISON: None available.    IMPRESSION:    Cardiac size is within normal limits.   There is diffuse interstitial lung opacification with a mid to upper lung   predominance, likely representing the patient's given history of   sarcoidosis. Superimposed pneumonia cannot be excluded. Comparison with   prior studies would be helpful for further evaluation.  No pneumothorax.  There is no acute osseous abnormality.                 LUIZA TINEO M.D., RADIOLOGY RESIDENT  This document has been electronically signed.  MELANY GREEN M.D. ATTENDING RADIOLOGIST    < end of copied text >

## 2017-09-11 NOTE — H&P ADULT - FAMILY HISTORY
Father  Still living? No  Family history of essential hypertension, Age at diagnosis: Age Unknown  Family history of pancreatic cancer, Age at diagnosis: Age Unknown

## 2017-09-11 NOTE — CONSULT NOTE ADULT - ASSESSMENT
47 year old male with a PMHx of sarcoidosis, HTN, asthma, and COPD presents to the ED with severe leg spasms and headache leading to a presyncopal episode, found to have a significant abnormal EKG and moderate inspiratory and expiratory wheezing in the setting of Entero/Rhino virus, admitted to tele for Near Syncope, r/o ACS and COPD exacerbation.

## 2017-09-12 LAB
APTT BLD: 24.3 SEC — LOW (ref 27.5–37.4)
BASE EXCESS BLDA CALC-SCNC: -1.7 MMOL/L — SIGNIFICANT CHANGE UP
BUN SERPL-MCNC: 21 MG/DL — SIGNIFICANT CHANGE UP (ref 7–23)
CALCIUM SERPL-MCNC: 9.7 MG/DL — SIGNIFICANT CHANGE UP (ref 8.4–10.5)
CHLORIDE BLDA-SCNC: 105 MMOL/L — SIGNIFICANT CHANGE UP (ref 96–108)
CHLORIDE SERPL-SCNC: 99 MMOL/L — SIGNIFICANT CHANGE UP (ref 98–107)
CO2 SERPL-SCNC: 22 MMOL/L — SIGNIFICANT CHANGE UP (ref 22–31)
CREAT BLDA-MCNC: 1.07 MG/DL — SIGNIFICANT CHANGE UP (ref 0.5–1.3)
CREAT SERPL-MCNC: 1.22 MG/DL — SIGNIFICANT CHANGE UP (ref 0.5–1.3)
GLUCOSE BLDA-MCNC: 188 MG/DL — HIGH (ref 70–99)
GLUCOSE SERPL-MCNC: 181 MG/DL — HIGH (ref 70–99)
HCO3 BLDA-SCNC: 24 MMOL/L — SIGNIFICANT CHANGE UP (ref 22–26)
HCT VFR BLD CALC: 42.2 % — SIGNIFICANT CHANGE UP (ref 39–50)
HCT VFR BLDA CALC: 43.9 % — SIGNIFICANT CHANGE UP (ref 39–51)
HGB BLD-MCNC: 13.7 G/DL — SIGNIFICANT CHANGE UP (ref 13–17)
HGB BLDA-MCNC: 14.3 G/DL — SIGNIFICANT CHANGE UP (ref 13–17)
INR BLD: 1.17 — SIGNIFICANT CHANGE UP (ref 0.88–1.17)
LACTATE BLDA-SCNC: 5 MMOL/L — CRITICAL HIGH (ref 0.5–2)
MCHC RBC-ENTMCNC: 28.2 PG — SIGNIFICANT CHANGE UP (ref 27–34)
MCHC RBC-ENTMCNC: 32.5 % — SIGNIFICANT CHANGE UP (ref 32–36)
MCV RBC AUTO: 87 FL — SIGNIFICANT CHANGE UP (ref 80–100)
NRBC # FLD: 0 — SIGNIFICANT CHANGE UP
PCO2 BLDA: 35 MMHG — SIGNIFICANT CHANGE UP (ref 35–48)
PH BLDA: 7.42 PH — SIGNIFICANT CHANGE UP (ref 7.35–7.45)
PLATELET # BLD AUTO: 176 K/UL — SIGNIFICANT CHANGE UP (ref 150–400)
PMV BLD: 12.3 FL — SIGNIFICANT CHANGE UP (ref 7–13)
PO2 BLDA: 192 MMHG — HIGH (ref 83–108)
POTASSIUM BLDA-SCNC: 4 MMOL/L — SIGNIFICANT CHANGE UP (ref 3.4–4.5)
POTASSIUM SERPL-MCNC: 4.4 MMOL/L — SIGNIFICANT CHANGE UP (ref 3.5–5.3)
POTASSIUM SERPL-SCNC: 4.4 MMOL/L — SIGNIFICANT CHANGE UP (ref 3.5–5.3)
PROTHROM AB SERPL-ACNC: 13.1 SEC — SIGNIFICANT CHANGE UP (ref 9.8–13.1)
RBC # BLD: 4.85 M/UL — SIGNIFICANT CHANGE UP (ref 4.2–5.8)
RBC # FLD: 13.1 % — SIGNIFICANT CHANGE UP (ref 10.3–14.5)
SAO2 % BLDA: 99.8 % — HIGH (ref 95–99)
SODIUM BLDA-SCNC: 137 MMOL/L — SIGNIFICANT CHANGE UP (ref 136–146)
SODIUM SERPL-SCNC: 139 MMOL/L — SIGNIFICANT CHANGE UP (ref 135–145)
WBC # BLD: 23.93 K/UL — HIGH (ref 3.8–10.5)
WBC # FLD AUTO: 23.93 K/UL — HIGH (ref 3.8–10.5)

## 2017-09-12 PROCEDURE — 70450 CT HEAD/BRAIN W/O DYE: CPT | Mod: 26

## 2017-09-12 PROCEDURE — 99291 CRITICAL CARE FIRST HOUR: CPT

## 2017-09-12 PROCEDURE — 93010 ELECTROCARDIOGRAM REPORT: CPT

## 2017-09-12 PROCEDURE — 71010: CPT | Mod: 26

## 2017-09-12 PROCEDURE — 95819 EEG AWAKE AND ASLEEP: CPT | Mod: 26

## 2017-09-12 PROCEDURE — 31500 INSERT EMERGENCY AIRWAY: CPT | Mod: GC

## 2017-09-12 PROCEDURE — 93306 TTE W/DOPPLER COMPLETE: CPT | Mod: 26

## 2017-09-12 PROCEDURE — 71010: CPT | Mod: 26,77

## 2017-09-12 PROCEDURE — 99233 SBSQ HOSP IP/OBS HIGH 50: CPT | Mod: GC

## 2017-09-12 RX ORDER — ACETAMINOPHEN 500 MG
650 TABLET ORAL EVERY 6 HOURS
Qty: 0 | Refills: 0 | Status: DISCONTINUED | OUTPATIENT
Start: 2017-09-12 | End: 2017-11-02

## 2017-09-12 RX ORDER — PIPERACILLIN AND TAZOBACTAM 4; .5 G/20ML; G/20ML
3.38 INJECTION, POWDER, LYOPHILIZED, FOR SOLUTION INTRAVENOUS EVERY 12 HOURS
Qty: 0 | Refills: 0 | Status: DISCONTINUED | OUTPATIENT
Start: 2017-09-12 | End: 2017-09-14

## 2017-09-12 RX ORDER — VALPROIC ACID (AS SODIUM SALT) 250 MG/5ML
100 SOLUTION, ORAL ORAL EVERY 8 HOURS
Qty: 0 | Refills: 0 | Status: DISCONTINUED | OUTPATIENT
Start: 2017-09-12 | End: 2017-09-12

## 2017-09-12 RX ORDER — VALPROIC ACID (AS SODIUM SALT) 250 MG/5ML
2750 SOLUTION, ORAL ORAL ONCE
Qty: 0 | Refills: 0 | Status: COMPLETED | OUTPATIENT
Start: 2017-09-12 | End: 2017-09-12

## 2017-09-12 RX ORDER — VALPROIC ACID (AS SODIUM SALT) 250 MG/5ML
500 SOLUTION, ORAL ORAL EVERY 8 HOURS
Qty: 0 | Refills: 0 | Status: DISCONTINUED | OUTPATIENT
Start: 2017-09-12 | End: 2017-09-15

## 2017-09-12 RX ORDER — SODIUM CHLORIDE 9 MG/ML
500 INJECTION INTRAMUSCULAR; INTRAVENOUS; SUBCUTANEOUS ONCE
Qty: 0 | Refills: 0 | Status: COMPLETED | OUTPATIENT
Start: 2017-09-12 | End: 2017-09-12

## 2017-09-12 RX ORDER — IPRATROPIUM/ALBUTEROL SULFATE 18-103MCG
3 AEROSOL WITH ADAPTER (GRAM) INHALATION ONCE
Qty: 0 | Refills: 0 | Status: DISCONTINUED | OUTPATIENT
Start: 2017-09-12 | End: 2017-09-12

## 2017-09-12 RX ORDER — CHLORHEXIDINE GLUCONATE 213 G/1000ML
1 SOLUTION TOPICAL DAILY
Qty: 0 | Refills: 0 | Status: DISCONTINUED | OUTPATIENT
Start: 2017-09-12 | End: 2017-10-07

## 2017-09-12 RX ORDER — FENTANYL CITRATE 50 UG/ML
100 INJECTION INTRAVENOUS ONCE
Qty: 0 | Refills: 0 | Status: DISCONTINUED | OUTPATIENT
Start: 2017-09-12 | End: 2017-09-12

## 2017-09-12 RX ORDER — PROPOFOL 10 MG/ML
30 INJECTION, EMULSION INTRAVENOUS
Qty: 1000 | Refills: 0 | Status: DISCONTINUED | OUTPATIENT
Start: 2017-09-12 | End: 2017-09-12

## 2017-09-12 RX ORDER — PHENYLEPHRINE HYDROCHLORIDE 10 MG/ML
2 INJECTION INTRAVENOUS
Qty: 160 | Refills: 0 | Status: DISCONTINUED | OUTPATIENT
Start: 2017-09-12 | End: 2017-09-12

## 2017-09-12 RX ORDER — PROPOFOL 10 MG/ML
30 INJECTION, EMULSION INTRAVENOUS
Qty: 1000 | Refills: 0 | Status: DISCONTINUED | OUTPATIENT
Start: 2017-09-12 | End: 2017-09-13

## 2017-09-12 RX ORDER — PHENYLEPHRINE HYDROCHLORIDE 10 MG/ML
0.28 INJECTION INTRAVENOUS
Qty: 160 | Refills: 0 | Status: DISCONTINUED | OUTPATIENT
Start: 2017-09-12 | End: 2017-09-13

## 2017-09-12 RX ADMIN — PHENYLEPHRINE HYDROCHLORIDE 36.41 MICROGRAM(S)/KG/MIN: 10 INJECTION INTRAVENOUS at 07:47

## 2017-09-12 RX ADMIN — PIPERACILLIN AND TAZOBACTAM 25 GRAM(S): 4; .5 INJECTION, POWDER, LYOPHILIZED, FOR SOLUTION INTRAVENOUS at 20:39

## 2017-09-12 RX ADMIN — FENTANYL CITRATE 100 MICROGRAM(S): 50 INJECTION INTRAVENOUS at 05:00

## 2017-09-12 RX ADMIN — PROPOFOL 17.48 MICROGRAM(S)/KG/MIN: 10 INJECTION, EMULSION INTRAVENOUS at 07:47

## 2017-09-12 RX ADMIN — Medication 650 MILLIGRAM(S): at 20:39

## 2017-09-12 RX ADMIN — Medication 27.5 MILLIGRAM(S): at 14:19

## 2017-09-12 RX ADMIN — SODIUM CHLORIDE 1000 MILLILITER(S): 9 INJECTION INTRAMUSCULAR; INTRAVENOUS; SUBCUTANEOUS at 05:35

## 2017-09-12 RX ADMIN — AZITHROMYCIN 500 MILLIGRAM(S): 500 TABLET, FILM COATED ORAL at 14:19

## 2017-09-12 RX ADMIN — Medication 25.83 MILLIGRAM(S): at 08:33

## 2017-09-12 RX ADMIN — Medication 27.5 MILLIGRAM(S): at 22:01

## 2017-09-12 RX ADMIN — ENOXAPARIN SODIUM 40 MILLIGRAM(S): 100 INJECTION SUBCUTANEOUS at 12:28

## 2017-09-12 RX ADMIN — Medication 2 MILLIGRAM(S): at 04:20

## 2017-09-12 RX ADMIN — Medication 40 MILLIGRAM(S): at 12:28

## 2017-09-12 RX ADMIN — FENTANYL CITRATE 100 MICROGRAM(S): 50 INJECTION INTRAVENOUS at 05:30

## 2017-09-12 NOTE — CHART NOTE - NSCHARTNOTEFT_GEN_A_CORE
47M with a PMH of sarcoidosis, HTN, COPD, and asthma complicated by pneumothoraces x3 who presented to the ED with severe leg spasms on 9/10. Began suddenly two days prior in the patient's home and have become more regular/erratic and worsening in contracture lasting seconds to minutes associated with intense tingling and pain sensation on left side of body and HA throughout head. Neurology was consulted and recommended CT head to r/o bleed. CTA of head and neck were done and normal.     On admission he was also noted to have abnormal EKG with TWI in V1,V2, biphasic T waves in V3-V5 and cardiology was consulted. CE were negative, asa and plavix load per cardiology with EKG and CE monitoring. He tested positive for entero/rhinovirus and had wheezing on exam. He was started on duonebs, steroids, azithromycin. Pulmonary was consulted for complicated lung history with current wheezing. Recommended CT chest for history of sarcoid which was obtained yesterday.     Overnight on 9/11 patient became unresponsive with last known normal at 4AM. Stroke code was called at 4:10AM for facial droop. On exam, patient had incontinence and tongue trauma and was given ativan 2mg x2 for possible seizure however did not improve. He continued to be obtunded with labored breathing and was intubated for airway protection.    Neuro - possible recent seizure vs stroke  - now intubated and sedated  - neurology following, will load with valproate and obtain video EEG, will order MRI  - f/u CT head    CVS - new onset afib with EKG changes  - admitted with TWI and bipashic T waves, now having MICHELLE in V2-V3  - CE have been negative x3  - Cardiology following, if CT head negative for bleed, can start ACS anticoagulation   - c/w asa and plavix  - started on phenylephrine due to hypotension with intubation    Pulm - new obtundation this AM, intubated and sedated  - CT chest consistent with sarcoid findings: bilateral centrilobular nodules, mediastinal and hilar KORI    GI - NPO s/p intubation    Renal - no active issues    Hem/onc - no active issues 47M with a PMH of sarcoidosis, HTN, COPD, and asthma complicated by pneumothoraces x3 who presented to the ED with severe leg spasms on 9/10. Began suddenly two days prior in the patient's home and have become more regular/erratic and worsening in contracture lasting seconds to minutes associated with intense tingling and pain sensation on left side of body and HA throughout head. Neurology was consulted and recommended CT head to r/o bleed. CTA of head and neck were done and normal.     On admission he was also noted to have abnormal EKG with TWI in V1,V2, biphasic T waves in V3-V5 and cardiology was consulted. CE were negative, asa and plavix load per cardiology with EKG and CE monitoring. He tested positive for entero/rhinovirus and had wheezing on exam. He was started on duonebs, steroids, azithromycin. Pulmonary was consulted for complicated lung history with current wheezing. Recommended CT chest for history of sarcoid which was obtained yesterday.     Overnight on 9/11 patient became unresponsive with last known normal at 4AM. Stroke code was called at 4:10AM for facial droop. On exam, patient had incontinence and tongue trauma and was given ativan 2mg x2 for possible seizure however did not improve. He continued to be obtunded with labored breathing and was intubated for airway protection.    Neuro - possible recent seizure vs stroke  - now intubated and sedated  - neurology following, will load with valproate and obtain video EEG, will order MRI  - f/u CT head    CVS - new onset afib with EKG changes  - admitted with TWI and bipashic T waves, now having MICHELLE in V2-V3  - CE have been negative x3  - Cardiology following, if CT head negative for bleed, can start ACS anticoagulation   - c/w asa and plavix  - started on phenylephrine due to hypotension with intubation    Pulm - new obtundation this AM, intubated and sedated  - CT chest consistent with sarcoid findings: bilateral centrilobular nodules, mediastinal and hilar KORI  - will obtain more info from family regarding sarcoid/treatment history    GI - NPO s/p intubation    Renal - no active issues    Hem/onc - no active issues

## 2017-09-12 NOTE — PROGRESS NOTE ADULT - PROBLEM SELECTOR PLAN 2
Patient has coarse rhonchi: Has been on steroids: he does have hx of asthma too: Continue bronchodilators and steroids; Vent management per MICU : extubate and wean as tolerated.   ;

## 2017-09-12 NOTE — PROGRESS NOTE ADULT - SUBJECTIVE AND OBJECTIVE BOX
HPI: 47 year old male with history of HTN, COPD, asthma, sarcoidosis, ? pneumothorax required chest tube x 3 in past, last in 2016 p/w multiple medical complaints. Patient came to ED earlier EVENT NOTE  HPI: 47 year old male with history of HTN, COPD, asthma, sarcoidosis, ? pneumothorax required chest tube x 3 in past, last in 2016 p/w multiple medical complaints. presenting with LLE spasm In Ed CTH (-) bleed. Pt ASA and Plavix loaded for dynamic EKG changes. CE (-) x 3. Called Now during RRT/code stroke for MICHELLE V2,V3 and new Afib. Pt now s/p intubation and pressors transferring to MICU.  Labs and CT head pending. Seen by Neuro started Valproate for right sided eye deviation, urinary incontinence and tongue trauma, consistent with possible seizure activity.  PLAN  Patient with new Atrial fibrillation and ST elevations V2 and V3 s/p code stroke  CT head pending, If CT head negative treat for ASC  Stat EKG s/p intubation and rate control  Patient already asa and plavix loaded  If CT heat negative for bleed continue ASA 81 mg daily, Plavix 75mg daily  Trend cardiac enzymes  Serial EKG   Echo in AM to evaluate LV function and wall motion abnormality

## 2017-09-12 NOTE — PROGRESS NOTE ADULT - SUBJECTIVE AND OBJECTIVE BOX
Interval History:   Overnight RRT called on patient as he became unresponsive. Last known normal was around 4am when he walked to the bathroom.  Stroke code called at 4:10am for facial droop.   On my encounter, patient noted to have eyes deviated to the right, urinary incontinence, and tongue trauma.  Ativan 2 mg given for possible seizure.  Patient continued with labored breathing, requiring intubation.    Objective:   Vital Signs Last 24 Hrs  T(C): 36.7 (11 Sep 2017 23:10), Max: 37 (11 Sep 2017 06:01)  T(F): 98.1 (11 Sep 2017 23:10), Max: 98.6 (11 Sep 2017 06:01)  HR: 81 (11 Sep 2017 23:10) (74 - 99)  BP: 136/92 (11 Sep 2017 23:10) (123/100 - 157/110)  RR: 18 (11 Sep 2017 23:10) (16 - 20)  SpO2: 97% (11 Sep 2017 23:10) (96% - 100%)    Neurological Exam:  Mental Status: Lethargic and nonresponsive. Minimal movement to sternal rub. Does not withdraw to painful stimuli to nailbeds. Agitated when suctioned. Labored breathing, diaphoretic, tachycardic.    Cranial Nerves: PERRL, eyes deviated to right upper quadrant. No facial asymmetry.    Motor:   Tone: normal.   Strength: unable to assess    Toes flexor bilaterally    Other:    09-11    140  |  99  |  15  ----------------------------<  121<H>  4.3   |  26  |  1.06    Ca    9.4      11 Sep 2017 11:38  Mg     1.8     09-10    TPro  9.1<H>  /  Alb  4.2  /  TBili  0.4  /  DBili  x   /  AST  21  /  ALT  16  /  AlkPhos  68  09-10    LIVER FUNCTIONS - ( 10 Sep 2017 19:51 )  Alb: 4.2 g/dL / Pro: 9.1 g/dL / ALK PHOS: 68 u/L / ALT: 16 u/L / AST: 21 u/L / GGT: x                                 13.7   23.93 )-----------( 176      ( 12 Sep 2017 04:15 )             42.2     MEDICATIONS  (STANDING):  aspirin enteric coated 81 milliGRAM(s) Oral daily  atorvastatin 80 milliGRAM(s) Oral at bedtime  clopidogrel Tablet 75 milliGRAM(s) Oral daily  tiotropium 18 MICROgram(s) Capsule 1 Capsule(s) Inhalation daily  buDESOnide 160 MICROgram(s)/formoterol 4.5 MICROgram(s) Inhaler 2 Puff(s) Inhalation two times a day  enoxaparin Injectable 40 milliGRAM(s) SubCutaneous every 24 hours  azithromycin   Tablet 500 milliGRAM(s) Oral daily  losartan 25 milliGRAM(s) Oral daily  predniSONE   Tablet 20 milliGRAM(s) Oral daily  baclofen 10 milliGRAM(s) Oral two times a day  LORazepam   Injectable 2 milliGRAM(s) IV Push once  ALBUTerol/ipratropium for Nebulization. 3 milliLiter(s) Nebulizer once    MEDICATIONS  (PRN):  ALBUTerol/ipratropium for Nebulization 3 milliLiter(s) Nebulizer every 6 hours PRN Shortness of Breath and/or Wheezing  diazepam    Tablet 5 milliGRAM(s) Oral every 12 hours PRN muscle spasm Interval History:   Overnight RRT called on patient as he became unresponsive. Last known normal was around 4am when he walked to the bathroom.  Stroke code called at 4:10am for facial droop.   On my encounter, patient noted to have eyes deviated to the right, urinary incontinence, and tongue trauma.  Ativan 2 mg given for possible seizure.  Patient continued with labored breathing, requiring intubation.    Objective:   Vital Signs Last 24 Hrs  T(C): 36.7 (11 Sep 2017 23:10), Max: 37 (11 Sep 2017 06:01)  T(F): 98.1 (11 Sep 2017 23:10), Max: 98.6 (11 Sep 2017 06:01)  HR: 81 (11 Sep 2017 23:10) (74 - 99)  BP: 136/92 (11 Sep 2017 23:10) (123/100 - 157/110)  RR: 18 (11 Sep 2017 23:10) (16 - 20)  SpO2: 97% (11 Sep 2017 23:10) (96% - 100%)    Neurological Exam:  Mental Status: Stupor and nonresponsive. Minimal movement to sternal rub. Does not withdraw to painful stimuli to nailbeds. Agitated when suctioned. Labored breathing, diaphoretic, tachycardic.    Cranial Nerves: PERRL, eyes deviated to right upper quadrant. No facial asymmetry.    Motor:   Tone: normal.   Strength: unable to assess    Toes flexor bilaterally    Other:    09-11    140  |  99  |  15  ----------------------------<  121<H>  4.3   |  26  |  1.06    Ca    9.4      11 Sep 2017 11:38  Mg     1.8     09-10    TPro  9.1<H>  /  Alb  4.2  /  TBili  0.4  /  DBili  x   /  AST  21  /  ALT  16  /  AlkPhos  68  09-10    LIVER FUNCTIONS - ( 10 Sep 2017 19:51 )  Alb: 4.2 g/dL / Pro: 9.1 g/dL / ALK PHOS: 68 u/L / ALT: 16 u/L / AST: 21 u/L / GGT: x                                 13.7   23.93 )-----------( 176      ( 12 Sep 2017 04:15 )             42.2     MEDICATIONS  (STANDING):  aspirin enteric coated 81 milliGRAM(s) Oral daily  atorvastatin 80 milliGRAM(s) Oral at bedtime  clopidogrel Tablet 75 milliGRAM(s) Oral daily  tiotropium 18 MICROgram(s) Capsule 1 Capsule(s) Inhalation daily  buDESOnide 160 MICROgram(s)/formoterol 4.5 MICROgram(s) Inhaler 2 Puff(s) Inhalation two times a day  enoxaparin Injectable 40 milliGRAM(s) SubCutaneous every 24 hours  azithromycin   Tablet 500 milliGRAM(s) Oral daily  losartan 25 milliGRAM(s) Oral daily  predniSONE   Tablet 20 milliGRAM(s) Oral daily  baclofen 10 milliGRAM(s) Oral two times a day  LORazepam   Injectable 2 milliGRAM(s) IV Push once  ALBUTerol/ipratropium for Nebulization. 3 milliLiter(s) Nebulizer once    MEDICATIONS  (PRN):  ALBUTerol/ipratropium for Nebulization 3 milliLiter(s) Nebulizer every 6 hours PRN Shortness of Breath and/or Wheezing  diazepam    Tablet 5 milliGRAM(s) Oral every 12 hours PRN muscle spasm

## 2017-09-12 NOTE — CHART NOTE - NSCHARTNOTEFT_GEN_A_CORE
47M with a PMH of sarcoidosis, HTN, COPD, and asthma complicated by pneumothoraces x3 who presented to the ED with severe leg spasms on 9/10. Began suddenly two days prior in the patient's home and have become more regular/erratic and worsening in contracture lasting seconds to minutes associated with intense tingling and pain sensation on left side of body and HA throughout head. On admission he was also noted to have abnormal EKG with TWI in V1,V2, biphasic T waves in V3-V5 and cardiology was consulted. CE were negative, asa and Plavix load per cardiology with EKG and CE monitoring. He tested positive for entero/rhinovirus and had wheezing on exam. He was started on Duoneb, steroids, azithromycin.    RRT was called about 430 am as pt became unresponsive on the way from ED to floor. Pt was tachypneic to the 30s and wheezing. Pt lost bladder control and bit his lips. Code stroke was called for to r/o possible stroke. Ativan was given for possible seizure. Pt became progressively more tachypneic, and wheezing. Decision was made to intubate the patient to protect his airway.  Pt was subsequently transferred to MICU for further management.    Jonah Valentin  MAR [id number]

## 2017-09-12 NOTE — PROGRESS NOTE ADULT - SUBJECTIVE AND OBJECTIVE BOX
Patient is a 47y old  Male who presents with a chief complaint of "My leg wouldn't stop shaking." (11 Sep 2017 07:22)  events noted  transferred to MICU for ? seizure like activity and got intubated:      Any change in ROS:     MEDICATIONS  (STANDING):  atorvastatin 80 milliGRAM(s) Oral at bedtime  tiotropium 18 MICROgram(s) Capsule 1 Capsule(s) Inhalation daily  buDESOnide 160 MICROgram(s)/formoterol 4.5 MICROgram(s) Inhaler 2 Puff(s) Inhalation two times a day  enoxaparin Injectable 40 milliGRAM(s) SubCutaneous every 24 hours  azithromycin   Tablet 500 milliGRAM(s) Oral daily  losartan 25 milliGRAM(s) Oral daily  phenylephrine    Infusion 2 MICROgram(s)/kG/Min (36.413 mL/Hr) IV Continuous <Continuous>  propofol Infusion 30 MICROgram(s)/kG/Min (17.478 mL/Hr) IV Continuous <Continuous>  valproate sodium IVPB 500 milliGRAM(s) IV Intermittent every 8 hours  predniSONE   Tablet 40 milliGRAM(s) Oral daily    MEDICATIONS  (PRN):  diazepam    Tablet 5 milliGRAM(s) Oral every 12 hours PRN muscle spasm    Vital Signs Last 24 Hrs  T(C): 36.2 (12 Sep 2017 08:00), Max: 37 (11 Sep 2017 17:32)  T(F): 97.2 (12 Sep 2017 08:00), Max: 98.6 (11 Sep 2017 17:32)  HR: 128 (12 Sep 2017 12:00) (81 - 128)  BP: 83/65 (12 Sep 2017 12:00) (83/65 - 157/110)  BP(mean): 69 (12 Sep 2017 12:00) (63 - 105)  RR: 18 (12 Sep 2017 12:00) (13 - 19)  SpO2: 98% (12 Sep 2017 12:00) (97% - 100%)  Mode: AC/ CMV (Assist Control/ Continuous Mandatory Ventilation)  RR (machine): 12  TV (machine): 500  FiO2: 40  PEEP: 5  MAP: 10  PIP: 24    I&O's Summary    12 Sep 2017 07:01  -  12 Sep 2017 14:01  --------------------------------------------------------  IN: 297 mL / OUT: 235 mL / NET: 62 mL          Physical Exam:   GENERAL: Sedated  HEENT: GIOVANI/   Atraumatic, Normocephalic  ENMT: No tonsillar erythema, exudates, or enlargement; Moist mucous membranes, Good dentition, No lesions  NECK: Supple, No JVD, Normal thyroid  CHEST/LUNG: coarse rhonchi  CVS: Regular rate and rhythm; No murmurs, rubs, or gallops  GI: : Soft, Nontender, Nondistended; Bowel sounds present  NERVOUS SYSTEM:  sedated  EXTREMITIES:  2+ Peripheral Pulses, No clubbing, cyanosis, or edema  LYMPH: No lymphadenopathy noted  SKIN: No rashes or lesions  ENDOCRINOLOGY: No Thyromegaly  PSYCH: sedated    Labs:  ABG - ( 12 Sep 2017 04:27 )  pH: 7.42  /  pCO2: 35    /  pO2: 192   / HCO3: 24    / Base Excess: -1.7  /  SaO2: 99.8            25  CARDIAC MARKERS ( 11 Sep 2017 02:00 )  x     / < 0.06 ng/mL / 144 u/L / 1.84 ng/mL / x      CARDIAC MARKERS ( 10 Sep 2017 19:51 )  x     / < 0.06 ng/mL / x     / x     / x                                13.7   23.93 )-----------( 176      ( 12 Sep 2017 04:15 )             42.2                         13.3   10.25 )-----------( 155      ( 11 Sep 2017 11:38 )             41.0                         13.8   5.53  )-----------( 146      ( 10 Sep 2017 19:51 )             42.4                         13.4   6.65  )-----------( 141      ( 10 Sep 2017 11:57 )             41.0     09-12    139  |  99  |  21  ----------------------------<  181<H>  4.4   |  22  |  1.22  09-11    140  |  99  |  15  ----------------------------<  121<H>  4.3   |  26  |  1.06  09-10    138  |  100  |  14  ----------------------------<  145<H>  4.7   |  23  |  1.06  09-10    139  |  99  |  15  ----------------------------<  92  4.5   |  28  |  1.21    Ca    9.7      12 Sep 2017 04:15  Ca    9.4      11 Sep 2017 11:38  Ca    9.8      10 Sep 2017 19:51    TPro  9.1<H>  /  Alb  4.2  /  TBili  0.4  /  DBili  x   /  AST  21  /  ALT  16  /  AlkPhos  68  10    CAPILLARY BLOOD GLUCOSE  157 (12 Sep 2017 04:48)          LIVER FUNCTIONS - ( 10 Sep 2017 19:51 )  Alb: 4.2 g/dL / Pro: 9.1 g/dL / ALK PHOS: 68 u/L / ALT: 16 u/L / AST: 21 u/L / GGT: x           PT/INR - ( 12 Sep 2017 04:15 )   PT: 13.1 SEC;   INR: 1.17          PTT - ( 12 Sep 2017 04:15 )  PTT:24.3 SEC  Urinalysis Basic - ( 11 Sep 2017 09:34 )    Color: YELLOW / Appearance: CLEAR / S.029 / pH: 6.0  Gluc: NEGATIVE / Ketone: TRACE  / Bili: NEGATIVE / Urobili: NORMAL E.U.   Blood: NEGATIVE / Protein: 10 / Nitrite: NEGATIVE   Leuk Esterase: NEGATIVE / RBC: 0-2 / WBC 0-2   Sq Epi: x / Non Sq Epi: x / Bacteria: x      Cultures:       < from: Xray Chest 1 View AP- PORTABLE-Urgent (17 @ 11:47) >  TERPRETATION:     Sincethe last exam, an endotracheal tube has been introduced and is in   satisfactory position above the mel. Enteric tube also has been placed   running down the esophagus although its tip is not included on this study.    Bilateral interstitial opacities consistent with sarcoid. No effusions or   pneumothorax.      COMPARISON:   at 5:04 AM      IMPRESSION:  Status post intubation and enteric tube placement.                    RANDALL AMADOR M.D.; ATTENDING RADIOLOGIST    < end of copied text >                  Rapid Respiratory Viral Panel Result        09-11 @ 02:00  Rapid RVP --  Coronovirus --  Adenovirus --  Bordetella Pertussis --  Chlamydia Pneumonia --  Entero/RhinovirusPOSITIVE  HKU1 Coronovirus --  HMPV Coronovirus --  Influenza A --  Influenza AH1 --  Influenza AH1  --  Influenza AH3 --  Influenza B --  Mycoplasma Pneumoniae --  NL63 Coronovirus --  OC43 Coronovirus --  Parainfluenza 1 --  Parainfluenza 2 --  Parainfluenza 3 --  Parainfluenza 4 --  Resp Syncytial Virus --          Studies  Chest X-RAY  CT SCAN Chest   Venous Dopplers: LE:   CT Abdomen  Others

## 2017-09-12 NOTE — EEG REPORT - OTHER DETAILS
suppression burst, with suppression 4-8 seconds alternating with mixed frequency sharply contoured low amplitude 20-30uV activity

## 2017-09-12 NOTE — PROGRESS NOTE ADULT - PROBLEM SELECTOR PLAN 1
? seizure like activity this AM: Now intubated: neurology following: py is  getting EEG at this time.

## 2017-09-12 NOTE — PROGRESS NOTE ADULT - ASSESSMENT
47 year old male presenting with LLE spasm and had RRT/code stroke overnight; s/p intubation. Limited exam due to mental status however showed right sided eye deviation, urinary incontinence and tongue trauma, more consistent with possible seizure actiivity. PERRL, minimally withdraws to sternal rub, more agitated when suctioned.    Load with valproate 30 mg/kg  Video EEG  Initial CT head and CTA negative for acute pathology  Repeat CT head once stabilized  Continue medical management 47 year old male presenting with LLE spasm and had RRT/code stroke overnight; s/p intubation. Limited exam due to mental status however showed right sided eye deviation, urinary incontinence and tongue trauma, more consistent with possible seizure actiivity. PERRL, minimally withdraws to sternal rub, more agitated when suctioned.    Load with valproate 30 mg/kg  Video EEG  Initial CT head and CTA negative for acute pathology  Repeat CT head showed no acute infarct or hemorrhage, questionable 2 areas of fat emboli in right frontal lobe  MRI brain with and without contrast  Continue medical management

## 2017-09-12 NOTE — PROCEDURE NOTE - NSTRACHPOSTINTU_RESP_A_CORE
Chest X-Ray/Positive end tidal Co2 noted/Breath sounds bilateral/Appropriate capnography/Breath sounds equal/Chest excursion noted

## 2017-09-13 LAB
ALBUMIN SERPL ELPH-MCNC: 3.5 G/DL — SIGNIFICANT CHANGE UP (ref 3.3–5)
ALP SERPL-CCNC: 51 U/L — SIGNIFICANT CHANGE UP (ref 40–120)
ALT FLD-CCNC: 9 U/L — SIGNIFICANT CHANGE UP (ref 4–41)
AST SERPL-CCNC: 17 U/L — SIGNIFICANT CHANGE UP (ref 4–40)
BASOPHILS # BLD AUTO: 0.02 K/UL — SIGNIFICANT CHANGE UP (ref 0–0.2)
BASOPHILS NFR BLD AUTO: 0.1 % — SIGNIFICANT CHANGE UP (ref 0–2)
BILIRUB SERPL-MCNC: 0.4 MG/DL — SIGNIFICANT CHANGE UP (ref 0.2–1.2)
BUN SERPL-MCNC: 24 MG/DL — HIGH (ref 7–23)
CALCIUM SERPL-MCNC: 9.1 MG/DL — SIGNIFICANT CHANGE UP (ref 8.4–10.5)
CHLORIDE SERPL-SCNC: 102 MMOL/L — SIGNIFICANT CHANGE UP (ref 98–107)
CO2 SERPL-SCNC: 30 MMOL/L — SIGNIFICANT CHANGE UP (ref 22–31)
CREAT SERPL-MCNC: 1.16 MG/DL — SIGNIFICANT CHANGE UP (ref 0.5–1.3)
EOSINOPHIL # BLD AUTO: 0 K/UL — SIGNIFICANT CHANGE UP (ref 0–0.5)
EOSINOPHIL NFR BLD AUTO: 0 % — SIGNIFICANT CHANGE UP (ref 0–6)
GLUCOSE SERPL-MCNC: 87 MG/DL — SIGNIFICANT CHANGE UP (ref 70–99)
HCT VFR BLD CALC: 41.8 % — SIGNIFICANT CHANGE UP (ref 39–50)
HGB BLD-MCNC: 13.1 G/DL — SIGNIFICANT CHANGE UP (ref 13–17)
IMM GRANULOCYTES # BLD AUTO: 0.05 # — SIGNIFICANT CHANGE UP
IMM GRANULOCYTES NFR BLD AUTO: 0.3 % — SIGNIFICANT CHANGE UP (ref 0–1.5)
LACTATE SERPL-SCNC: 2 MMOL/L — SIGNIFICANT CHANGE UP (ref 0.5–2)
LYMPHOCYTES # BLD AUTO: 17.6 % — SIGNIFICANT CHANGE UP (ref 13–44)
LYMPHOCYTES # BLD AUTO: 2.67 K/UL — SIGNIFICANT CHANGE UP (ref 1–3.3)
MAGNESIUM SERPL-MCNC: 2.2 MG/DL — SIGNIFICANT CHANGE UP (ref 1.6–2.6)
MCHC RBC-ENTMCNC: 28.2 PG — SIGNIFICANT CHANGE UP (ref 27–34)
MCHC RBC-ENTMCNC: 31.3 % — LOW (ref 32–36)
MCV RBC AUTO: 90.1 FL — SIGNIFICANT CHANGE UP (ref 80–100)
MONOCYTES # BLD AUTO: 1.23 K/UL — HIGH (ref 0–0.9)
MONOCYTES NFR BLD AUTO: 8.1 % — SIGNIFICANT CHANGE UP (ref 2–14)
NEUTROPHILS # BLD AUTO: 11.18 K/UL — HIGH (ref 1.8–7.4)
NEUTROPHILS NFR BLD AUTO: 73.9 % — SIGNIFICANT CHANGE UP (ref 43–77)
NRBC # FLD: 0 — SIGNIFICANT CHANGE UP
PHOSPHATE SERPL-MCNC: 3.8 MG/DL — SIGNIFICANT CHANGE UP (ref 2.5–4.5)
PLATELET # BLD AUTO: 161 K/UL — SIGNIFICANT CHANGE UP (ref 150–400)
PMV BLD: 12.5 FL — SIGNIFICANT CHANGE UP (ref 7–13)
POTASSIUM SERPL-MCNC: 4.5 MMOL/L — SIGNIFICANT CHANGE UP (ref 3.5–5.3)
POTASSIUM SERPL-SCNC: 4.5 MMOL/L — SIGNIFICANT CHANGE UP (ref 3.5–5.3)
PROT SERPL-MCNC: 7.5 G/DL — SIGNIFICANT CHANGE UP (ref 6–8.3)
RBC # BLD: 4.64 M/UL — SIGNIFICANT CHANGE UP (ref 4.2–5.8)
RBC # FLD: 13.4 % — SIGNIFICANT CHANGE UP (ref 10.3–14.5)
SODIUM SERPL-SCNC: 143 MMOL/L — SIGNIFICANT CHANGE UP (ref 135–145)
SPECIMEN SOURCE: SIGNIFICANT CHANGE UP
VALPROATE SERPL-MCNC: 59.2 UG/ML — SIGNIFICANT CHANGE UP (ref 50–100)
WBC # BLD: 15.15 K/UL — HIGH (ref 3.8–10.5)
WBC # FLD AUTO: 15.15 K/UL — HIGH (ref 3.8–10.5)

## 2017-09-13 PROCEDURE — 99233 SBSQ HOSP IP/OBS HIGH 50: CPT | Mod: GC

## 2017-09-13 PROCEDURE — 70544 MR ANGIOGRAPHY HEAD W/O DYE: CPT | Mod: 26,59

## 2017-09-13 PROCEDURE — 95951: CPT | Mod: 26

## 2017-09-13 PROCEDURE — 70553 MRI BRAIN STEM W/O & W/DYE: CPT | Mod: 26

## 2017-09-13 PROCEDURE — 99291 CRITICAL CARE FIRST HOUR: CPT

## 2017-09-13 PROCEDURE — 99233 SBSQ HOSP IP/OBS HIGH 50: CPT

## 2017-09-13 PROCEDURE — 70547 MR ANGIOGRAPHY NECK W/O DYE: CPT | Mod: 26

## 2017-09-13 PROCEDURE — 95957 EEG DIGITAL ANALYSIS: CPT | Mod: 26

## 2017-09-13 RX ORDER — BUDESONIDE AND FORMOTEROL FUMARATE DIHYDRATE 160; 4.5 UG/1; UG/1
2 AEROSOL RESPIRATORY (INHALATION)
Qty: 0 | Refills: 0 | Status: DISCONTINUED | OUTPATIENT
Start: 2017-09-13 | End: 2017-09-15

## 2017-09-13 RX ORDER — TIOTROPIUM BROMIDE 18 UG/1
1 CAPSULE ORAL; RESPIRATORY (INHALATION) DAILY
Qty: 0 | Refills: 0 | Status: DISCONTINUED | OUTPATIENT
Start: 2017-09-13 | End: 2017-09-15

## 2017-09-13 RX ORDER — ASPIRIN/CALCIUM CARB/MAGNESIUM 324 MG
81 TABLET ORAL DAILY
Qty: 0 | Refills: 0 | Status: DISCONTINUED | OUTPATIENT
Start: 2017-09-13 | End: 2017-09-26

## 2017-09-13 RX ORDER — IPRATROPIUM/ALBUTEROL SULFATE 18-103MCG
3 AEROSOL WITH ADAPTER (GRAM) INHALATION EVERY 6 HOURS
Qty: 0 | Refills: 0 | Status: DISCONTINUED | OUTPATIENT
Start: 2017-09-13 | End: 2017-09-25

## 2017-09-13 RX ADMIN — Medication 27.5 MILLIGRAM(S): at 21:11

## 2017-09-13 RX ADMIN — ATORVASTATIN CALCIUM 80 MILLIGRAM(S): 80 TABLET, FILM COATED ORAL at 21:10

## 2017-09-13 RX ADMIN — AZITHROMYCIN 500 MILLIGRAM(S): 500 TABLET, FILM COATED ORAL at 13:14

## 2017-09-13 RX ADMIN — PROPOFOL 17.48 MICROGRAM(S)/KG/MIN: 10 INJECTION, EMULSION INTRAVENOUS at 05:25

## 2017-09-13 RX ADMIN — Medication 1 APPLICATION(S): at 05:25

## 2017-09-13 RX ADMIN — Medication 3 MILLILITER(S): at 09:56

## 2017-09-13 RX ADMIN — Medication 40 MILLIGRAM(S): at 05:25

## 2017-09-13 RX ADMIN — Medication 27.5 MILLIGRAM(S): at 13:14

## 2017-09-13 RX ADMIN — PHENYLEPHRINE HYDROCHLORIDE 5 MICROGRAM(S)/KG/MIN: 10 INJECTION INTRAVENOUS at 05:26

## 2017-09-13 RX ADMIN — ENOXAPARIN SODIUM 40 MILLIGRAM(S): 100 INJECTION SUBCUTANEOUS at 11:20

## 2017-09-13 RX ADMIN — Medication 27.5 MILLIGRAM(S): at 05:26

## 2017-09-13 RX ADMIN — PIPERACILLIN AND TAZOBACTAM 25 GRAM(S): 4; .5 INJECTION, POWDER, LYOPHILIZED, FOR SOLUTION INTRAVENOUS at 05:25

## 2017-09-13 RX ADMIN — PIPERACILLIN AND TAZOBACTAM 25 GRAM(S): 4; .5 INJECTION, POWDER, LYOPHILIZED, FOR SOLUTION INTRAVENOUS at 17:52

## 2017-09-13 RX ADMIN — CHLORHEXIDINE GLUCONATE 1 APPLICATION(S): 213 SOLUTION TOPICAL at 17:41

## 2017-09-13 RX ADMIN — Medication 81 MILLIGRAM(S): at 21:10

## 2017-09-13 NOTE — PROGRESS NOTE ADULT - SUBJECTIVE AND OBJECTIVE BOX
Patient is a 47y old  Male who presents with a chief complaint of "My leg wouldn't stop shaking." (11 Sep 2017 07:22)  extubated today  awaiting mri  no sob      Any change in ROS:     MEDICATIONS  (STANDING):  atorvastatin 80 milliGRAM(s) Oral at bedtime  enoxaparin Injectable 40 milliGRAM(s) SubCutaneous every 24 hours  valproate sodium IVPB 500 milliGRAM(s) IV Intermittent every 8 hours  predniSONE   Tablet 40 milliGRAM(s) Oral daily  piperacillin/tazobactam IVPB. 3.375 Gram(s) IV Intermittent every 12 hours  chlorhexidine 4% Liquid 1 Application(s) Topical daily  buDESOnide 160 MICROgram(s)/formoterol 4.5 MICROgram(s) Inhaler 2 Puff(s) Inhalation two times a day  tiotropium 18 MICROgram(s) Capsule 1 Capsule(s) Inhalation daily    MEDICATIONS  (PRN):  acetaminophen    Suspension 650 milliGRAM(s) Oral every 6 hours PRN For Temp greater than 38 C (100.4 F)  ALBUTerol/ipratropium for Nebulization 3 milliLiter(s) Nebulizer every 6 hours PRN Shortness of Breath and/or Wheezing    Vital Signs Last 24 Hrs  T(C): 36.9 (13 Sep 2017 12:00), Max: 38.3 (12 Sep 2017 20:00)  T(F): 98.4 (13 Sep 2017 12:00), Max: 101 (12 Sep 2017 20:00)  HR: 83 (13 Sep 2017 17:00) (72 - 110)  BP: 115/90 (13 Sep 2017 17:00) (82/61 - 126/94)  BP(mean): 96 (13 Sep 2017 17:00) (64 - 104)  RR: 17 (13 Sep 2017 17:00) (12 - 32)  SpO2: 98% (13 Sep 2017 17:00) (94% - 100%)  Mode: AC/ CMV (Assist Control/ Continuous Mandatory Ventilation)  RR (machine): 12  TV (machine): 500  FiO2: 40  PEEP: 5  MAP: 11.8  PIP: 27    I&O's Summary    12 Sep 2017 07:01  -  13 Sep 2017 07:00  --------------------------------------------------------  IN: 1075.5 mL / OUT: 1985 mL / NET: -909.5 mL    13 Sep 2017 07:01  -  13 Sep 2017 18:02  --------------------------------------------------------  IN: 155 mL / OUT: 535 mL / NET: -380 mL          Physical Exam:   GENERAL: NAD, well-groomed, well-developed  HEENT: GIOVANI/   Atraumatic, Normocephalic  ENMT: No tonsillar erythema, exudates, or enlargement; Moist mucous membranes, Good dentition, No lesions  NECK: Supple, No JVD, Normal thyroid  CHEST/LUNG: coarse rhonchi  CVS: Regular rate and rhythm; No murmurs, rubs, or gallops  GI: : Soft, Nontender, Nondistended; Bowel sounds present  NERVOUS SYSTEM: lethargic  EXTREMITIES:  2+ Peripheral Pulses, No clubbing, cyanosis, or edema  LYMPH: No lymphadenopathy noted  SKIN: No rashes or lesions  ENDOCRINOLOGY: No Thyromegaly  PSYCH: Appropriate    Labs:  ABG - ( 12 Sep 2017 04:27 )  pH: 7.42  /  pCO2: 35    /  pO2: 192   / HCO3: 24    / Base Excess: -1.7  /  SaO2: 99.8            25                            13.1   15.15 )-----------( 161      ( 13 Sep 2017 05:50 )             41.8                         13.7   23.93 )-----------( 176      ( 12 Sep 2017 04:15 )             42.2                         13.3   10.25 )-----------( 155      ( 11 Sep 2017 11:38 )             41.0                         13.8   5.53  )-----------( 146      ( 10 Sep 2017 19:51 )             42.4                         13.4   6.65  )-----------( 141      ( 10 Sep 2017 11:57 )             41.0     09-13    143  |  102  |  24<H>  ----------------------------<  87  4.5   |  30  |  1.16  09-12    139  |  99  |  21  ----------------------------<  181<H>  4.4   |  22  |  1.22  09-11    140  |  99  |  15  ----------------------------<  121<H>  4.3   |  26  |  1.06  09-10    138  |  100  |  14  ----------------------------<  145<H>  4.7   |  23  |  1.06  09-10    139  |  99  |  15  ----------------------------<  92  4.5   |  28  |  1.21    Ca    9.1      13 Sep 2017 05:50  Ca    9.7      12 Sep 2017 04:15  Phos  3.8     09-13  Mg     2.2     09-13    TPro  7.5  /  Alb  3.5  /  TBili  0.4  /  DBili  x   /  AST  17  /  ALT  9   /  AlkPhos  51  09-13  TPro  9.1<H>  /  Alb  4.2  /  TBili  0.4  /  DBili  x   /  AST  21  /  ALT  16  /  AlkPhos  68  09-10    CAPILLARY BLOOD GLUCOSE          LIVER FUNCTIONS - ( 13 Sep 2017 05:50 )  Alb: 3.5 g/dL / Pro: 7.5 g/dL / ALK PHOS: 51 u/L / ALT: 9 u/L / AST: 17 u/L / GGT: x           PT/INR - ( 12 Sep 2017 04:15 )   PT: 13.1 SEC;   INR: 1.17          PTT - ( 12 Sep 2017 04:15 )  PTT:24.3 SEC    Lactate, Blood: 2.0 mmol/L (09-13 @ 05:50)  Cultures:                       Rapid Respiratory Viral Panel Result        09-11 @ 02:00  Rapid RVP --  Coronovirus --  Adenovirus --  Bordetella Pertussis --  Chlamydia Pneumonia --  Entero/RhinovirusPOSITIVE  HKU1 Coronovirus --  HMPV Coronovirus --  Influenza A --  Influenza AH1 --  Influenza AH1 2009 --  Influenza AH3 --  Influenza B --  Mycoplasma Pneumoniae --  NL63 Coronovirus --  OC43 Coronovirus --  Parainfluenza 1 --  Parainfluenza 2 --  Parainfluenza 3 --  Parainfluenza 4 --  Resp Syncytial Virus --          Studies  Chest X-RAY  CT SCAN Chest   Venous Dopplers: LE:   CT Abdomen  Others      < from: MRI Head w/wo Cont (09.13.17 @ 16:16) >    There is no gross evidence for large enhancing mass, acute hemorrhage, or   hydrocephalus within the limitations of this study.    The punctate foci of intracranial air on the prior head CT have grossly   resolved over the time interval.    I discussed the exam findings with Dr. Simons at 4:35 PM on 09/13/2017 with   read back.    IMPRESSION:    Areas of acute infarction involve the right greater than left frontal,   parietal, and occipital lobes, without hemorrhagic transformation.                  HECTOR WU M.D., ATTENDING RADIOLOGIST  This document has been electronically signed. Sep 13 2017  4:35PM    < end of copied text >  < from: Xray Chest 1 View AP- PORTABLE-Urgent (09.12.17 @ 11:47) >  TECHNIQUE:   Portable chest    INTERPRETATION:     Sincethe last exam, an endotracheal tube has been introduced and is in   satisfactory position above the mel. Enteric tube also has been placed   running down the esophagus although its tip is not included on this study.    Bilateral interstitial opacities consistent with sarcoid. No effusions or   pneumothorax.      COMPARISON:  September 12 at 5:04 AM      IMPRESSION:  Status post intubation and enteric tube placement.    < end of copied text >    < from: CT Chest No Cont (09.11.17 @ 20:24) >  eral mid to upper lung cysts again likely related to the patient's   given history of sarcoidosis. There are no central endobronchial lesions.    Evaluation of the bones demonstrate no significant abnormality.    IMPRESSION: Pulmonary nodular or patchy opacities throughout both lungs   with associated volume loss and architectural distortion as described   above related to sarcoidosis.    Enlargement of the main pulmonary artery can be seen in the setting of   pulmonary arterial hypertension.                  MELANY GREEN M.D. ATTENDING RADIOLOGIST  This document has been electronically signed. Sep 12 2017  9:07AM        < end of copied text >

## 2017-09-13 NOTE — PROGRESS NOTE ADULT - PROBLEM SELECTOR PLAN 2
Patient has coarse rhonchi: Has been on steroids: he does have hx of asthma too: Continue bronchodilators and steroids;

## 2017-09-13 NOTE — PROGRESS NOTE ADULT - SUBJECTIVE AND OBJECTIVE BOX
Subjective:    Pt seen and examined at bedside. Pt currently intubated. Family reports still with L-sided weakness.     MEDICATIONS  (STANDING):  atorvastatin 80 milliGRAM(s) Oral at bedtime  enoxaparin Injectable 40 milliGRAM(s) SubCutaneous every 24 hours  azithromycin   Tablet 500 milliGRAM(s) Oral daily  losartan 25 milliGRAM(s) Oral daily  valproate sodium IVPB 500 milliGRAM(s) IV Intermittent every 8 hours  predniSONE   Tablet 40 milliGRAM(s) Oral daily  phenylephrine    Infusion 0.275 MICROgram(s)/kG/Min (5 mL/Hr) IV Continuous <Continuous>  piperacillin/tazobactam IVPB. 3.375 Gram(s) IV Intermittent every 12 hours  chlorhexidine 4% Liquid 1 Application(s) Topical daily  buDESOnide 160 MICROgram(s)/formoterol 4.5 MICROgram(s) Inhaler 2 Puff(s) Inhalation two times a day  tiotropium 18 MICROgram(s) Capsule 1 Capsule(s) Inhalation daily    MEDICATIONS  (PRN):  acetaminophen    Suspension 650 milliGRAM(s) Oral every 6 hours PRN For Temp greater than 38 C (100.4 F)  ALBUTerol/ipratropium for Nebulization 3 milliLiter(s) Nebulizer every 6 hours PRN Shortness of Breath and/or Wheezing      Allergies  No Known Allergies      Objective:   Vital Signs Last 24 Hrs  T(C): 36.8 (13 Sep 2017 08:00), Max: 38.3 (12 Sep 2017 20:00)  T(F): 98.2 (13 Sep 2017 08:00), Max: 101 (12 Sep 2017 20:00)  HR: 74 (13 Sep 2017 10:00) (72 - 128)  BP: 123/89 (13 Sep 2017 10:00) (82/61 - 126/94)  BP(mean): 96 (13 Sep 2017 10:00) (63 - 104)  RR: 27 (13 Sep 2017 10:00) (12 - 32)  SpO2: 99% (13 Sep 2017 10:00) (94% - 100%)    Exam:   drowsy, oriented to person and hospital, able to follow simple commands  L gaze palsy, blinks to threat b/l, face symmetric, +dysarthria  at least 3/5 in RUE and RLE, LUE 1/5, LLE 0/5  reflexes 3+ on LUE and LLE, 2+ on RUE and RLE  toes down b/l     NIHSS 14, MRS 0     Other:  CBC Full  -  ( 13 Sep 2017 05:50 )  WBC Count : 15.15 K/uL  Hemoglobin : 13.1 g/dL  Hematocrit : 41.8 %  Platelet Count - Automated : 161 K/uL  Mean Cell Volume : 90.1 fL  Mean Cell Hemoglobin : 28.2 pg  Mean Cell Hemoglobin Concentration : 31.3 %  Auto Neutrophil # : 11.18 K/uL  Auto Lymphocyte # : 2.67 K/uL  Auto Monocyte # : 1.23 K/uL  Auto Eosinophil # : 0.00 K/uL  Auto Basophil # : 0.02 K/uL  Auto Neutrophil % : 73.9 %  Auto Lymphocyte % : 17.6 %  Auto Monocyte % : 8.1 %  Auto Eosinophil % : 0.0 %  Auto Basophil % : 0.1 %    09-13    143  |  102  |  24<H>  ----------------------------<  87  4.5   |  30  |  1.16    Ca    9.1      13 Sep 2017 05:50  Phos  3.8     09-13  Mg     2.2     09-13    TPro  7.5  /  Alb  3.5  /  TBili  0.4  /  DBili  x   /  AST  17  /  ALT  9   /  AlkPhos  51  09-13 09-13    143  |  102  |  24<H>  ----------------------------<  87  4.5   |  30  |  1.16    Ca    9.1      13 Sep 2017 05:50  Phos  3.8     09-13  Mg     2.2     09-13    TPro  7.5  /  Alb  3.5  /  TBili  0.4  /  DBili  x   /  AST  17  /  ALT  9   /  AlkPhos  51  09-13    LIVER FUNCTIONS - ( 13 Sep 2017 05:50 )  Alb: 3.5 g/dL / Pro: 7.5 g/dL / ALK PHOS: 51 u/L / ALT: 9 u/L / AST: 17 u/L / GGT: x             Imaging: Subjective:    Pt seen and examined at bedside. Pt currently extubated. Family reports still with L-sided weakness.     MEDICATIONS  (STANDING):  atorvastatin 80 milliGRAM(s) Oral at bedtime  enoxaparin Injectable 40 milliGRAM(s) SubCutaneous every 24 hours  azithromycin   Tablet 500 milliGRAM(s) Oral daily  losartan 25 milliGRAM(s) Oral daily  valproate sodium IVPB 500 milliGRAM(s) IV Intermittent every 8 hours  predniSONE   Tablet 40 milliGRAM(s) Oral daily  phenylephrine    Infusion 0.275 MICROgram(s)/kG/Min (5 mL/Hr) IV Continuous <Continuous>  piperacillin/tazobactam IVPB. 3.375 Gram(s) IV Intermittent every 12 hours  chlorhexidine 4% Liquid 1 Application(s) Topical daily  buDESOnide 160 MICROgram(s)/formoterol 4.5 MICROgram(s) Inhaler 2 Puff(s) Inhalation two times a day  tiotropium 18 MICROgram(s) Capsule 1 Capsule(s) Inhalation daily    MEDICATIONS  (PRN):  acetaminophen    Suspension 650 milliGRAM(s) Oral every 6 hours PRN For Temp greater than 38 C (100.4 F)  ALBUTerol/ipratropium for Nebulization 3 milliLiter(s) Nebulizer every 6 hours PRN Shortness of Breath and/or Wheezing      Allergies  No Known Allergies      Objective:   Vital Signs Last 24 Hrs  T(C): 36.8 (13 Sep 2017 08:00), Max: 38.3 (12 Sep 2017 20:00)  T(F): 98.2 (13 Sep 2017 08:00), Max: 101 (12 Sep 2017 20:00)  HR: 74 (13 Sep 2017 10:00) (72 - 128)  BP: 123/89 (13 Sep 2017 10:00) (82/61 - 126/94)  BP(mean): 96 (13 Sep 2017 10:00) (63 - 104)  RR: 27 (13 Sep 2017 10:00) (12 - 32)  SpO2: 99% (13 Sep 2017 10:00) (94% - 100%)    Exam:   drowsy, oriented to person and hospital, able to follow simple commands  L gaze palsy, blinks to threat b/l, face symmetric, +dysarthria  at least 3/5 in RUE and RLE, LUE 1/5, LLE 0/5  reflexes 3+ on LUE and LLE, 2+ on RUE and RLE  toes down b/l     NIHSS 14, MRS 0     Other:  CBC Full  -  ( 13 Sep 2017 05:50 )  WBC Count : 15.15 K/uL  Hemoglobin : 13.1 g/dL  Hematocrit : 41.8 %  Platelet Count - Automated : 161 K/uL  Mean Cell Volume : 90.1 fL  Mean Cell Hemoglobin : 28.2 pg  Mean Cell Hemoglobin Concentration : 31.3 %  Auto Neutrophil # : 11.18 K/uL  Auto Lymphocyte # : 2.67 K/uL  Auto Monocyte # : 1.23 K/uL  Auto Eosinophil # : 0.00 K/uL  Auto Basophil # : 0.02 K/uL  Auto Neutrophil % : 73.9 %  Auto Lymphocyte % : 17.6 %  Auto Monocyte % : 8.1 %  Auto Eosinophil % : 0.0 %  Auto Basophil % : 0.1 %    09-13    143  |  102  |  24<H>  ----------------------------<  87  4.5   |  30  |  1.16    Ca    9.1      13 Sep 2017 05:50  Phos  3.8     09-13  Mg     2.2     09-13    TPro  7.5  /  Alb  3.5  /  TBili  0.4  /  DBili  x   /  AST  17  /  ALT  9   /  AlkPhos  51  09-13 09-13    143  |  102  |  24<H>  ----------------------------<  87  4.5   |  30  |  1.16    Ca    9.1      13 Sep 2017 05:50  Phos  3.8     09-13  Mg     2.2     09-13    TPro  7.5  /  Alb  3.5  /  TBili  0.4  /  DBili  x   /  AST  17  /  ALT  9   /  AlkPhos  51  09-13    LIVER FUNCTIONS - ( 13 Sep 2017 05:50 )  Alb: 3.5 g/dL / Pro: 7.5 g/dL / ALK PHOS: 51 u/L / ALT: 9 u/L / AST: 17 u/L / GGT: x             Imaging:     CT head:   Two low-attenuation rounded foci within the right frontal lobe cortex, may  represent fat or air, of unclear etiology.    No acute intracranial hemorrhage or mass effect.    More sensitive evaluation with brain MRI is recommended.      Routine EEG: burst-suppression pattern

## 2017-09-13 NOTE — PROGRESS NOTE ADULT - ASSESSMENT
47M w/ sarcoidosis, HTN, COPD, and asthma complicated by pneumothoraces x3 admitted to MICU after intubation for airway protection from seizure like activity.     Neuro   -EEG w/ no seizures recorded. CTH w/ 2 foci in the R frontal cortex concerning for air emboli. MRI and MRA head/neck pending. c/w vimpat.     CVS - Pt w/ dynamic EKG and ASA/plavix loaded initially. CE negative x3. Unclear etiology for EKG changes although doubt ACS given no cardiac enzyme elevation.  -Was on phenylephrine for pressor support while sedated, can likely wean after extubation.     Pulm -Will wean sedation and CPAP, hopeful extubation  - CT chest consistent with sarcoid findings: bilateral centrilobular nodules, mediastinal and hilar KORI    GI - Advance diet if extubated    Renal - no active issues    Hem/onc - no active issues. 47M with a PMH of sarcoidosis, HTN, COPD, and asthma complicated by pneumothoraces x3 who presented to the ED with severe leg spasms on 9/10. Began suddenly two days prior in the patient's home and have become more regular/erratic and worsening in contracture lasting seconds to minutes associated with intense tingling and pain sensation on left side of body and HA throughout head. Neurology was consulted and recommended CT head to r/o bleed. CTA of head and neck were done and normal.     On admission he was also noted to have abnormal EKG with TWI in V1,V2, biphasic T waves in V3-V5 and cardiology was consulted. CE were negative, asa and plavix load per cardiology with EKG and CE monitoring. He tested positive for entero/rhinovirus and had wheezing on exam. He was started on duonebs, steroids, azithromycin. Pulmonary was consulted for complicated lung history with current wheezing. Recommended CT chest for history of sarcoid which was obtained yesterday.     Overnight on 9/11 patient became unresponsive with last known normal at 4AM. Stroke code was called at 4:10AM for facial droop. On exam, patient had incontinence and tongue trauma and was given ativan 2mg x2 for possible seizure however did not improve. He continued to be obtunded with labored breathing and was intubated for airway protection.    Neuro - possible recent seizure vs stroke  - now intubated and sedated  - neurology following, will load with valproate and obtain video EEG, will order MRI  - f/u CT head    CVS - new onset afib with EKG changes  - admitted with TWI and bipashic T waves, now having MICHELLE in V2-V3  - CE have been negative x3  - Cardiology following, if CT head negative for bleed, can start ACS anticoagulation   - c/w asa and plavix  - started on phenylephrine due to hypotension with intubation    Pulm - new obtundation this AM, intubated and sedated  - CT chest consistent with sarcoid findings: bilateral centrilobular nodules, mediastinal and hilar KORI    GI - NPO s/p intubation    Renal - no active issues    Hem/onc - no active issues    ID - Leukocytosis down trending, likely stress induced from seizures. Febrile yesterday. On zosyn and azithro for empiric coverage for aspiration PNA and atypicals. blood cultures pending     DVT ppx - Lovenox

## 2017-09-13 NOTE — PROGRESS NOTE ADULT - ASSESSMENT
46yo -American man initially presenting with LLE spasm, with hospital course c/b episode of unresponsiveness and gaze deviation concerning for seizure. Pt's mental status is improved today, though exam notable for L-sided weakness. DDx includes seizure with Raymon's Paralysis vs R-sided CVA w/ subsequent seizures.     - MRI Brain, MRA Head/Neck  - f/u EEG results  - c/w VPA 500mg BID 48yo -American man initially presenting with LLE spasm, with hospital course c/b episode of unresponsiveness and gaze deviation concerning for seizure. Pt's mental status is improved today, though exam notable for L-sided weakness. DDx includes seizure with Raymon's Paralysis vs R-sided CVA w/ subsequent seizures.     - MRI Brain w/o, MRA Head w/o, MRA Neck w/  - f/u EEG results  - c/w VPA 500mg BID  - seizure precautions  - c/w supportive care as per primary team

## 2017-09-13 NOTE — PROGRESS NOTE ADULT - SUBJECTIVE AND OBJECTIVE BOX
INTERVAL HPI/OVERNIGHT EVENTS:    SUBJECTIVE: Patient seen and examined at bedside.     CONSTITUTIONAL: No weakness, fevers or chills  EYES/ENT: No visual changes;  No vertigo or throat pain   NECK: No pain or stiffness  RESPIRATORY: No cough, wheezing, hemoptysis; No shortness of breath  CARDIOVASCULAR: No chest pain or palpitations  GASTROINTESTINAL: No abdominal or epigastric pain. No nausea, vomiting, or hematemesis; No diarrhea or constipation. No melena or hematochezia.  GENITOURINARY: No dysuria, frequency or hematuria  NEUROLOGICAL: No numbness or weakness  SKIN: No itching, rashes    OBJECTIVE:    VITAL SIGNS:  ICU Vital Signs Last 24 Hrs  T(C): 36.2 (13 Sep 2017 04:00), Max: 38.3 (12 Sep 2017 20:00)  T(F): 97.2 (13 Sep 2017 04:00), Max: 101 (12 Sep 2017 20:00)  HR: 93 (13 Sep 2017 07:00) (72 - 128)  BP: 107/78 (13 Sep 2017 07:00) (83/65 - 134/100)  BP(mean): 85 (13 Sep 2017 07:00) (63 - 105)  ABP: --  ABP(mean): --  RR: 32 (13 Sep 2017 07:00) (12 - 32)  SpO2: 98% (13 Sep 2017 07:) (97% - 100%)    Mode: AC/ CMV (Assist Control/ Continuous Mandatory Ventilation), RR (machine): 12, TV (machine): 500, FiO2: 40, PEEP: 5, MAP: 11.8, PIP: 27     @ 07: @ 07:00  --------------------------------------------------------  IN: 1075.5 mL / OUT: 1985 mL / NET: -909.5 mL     @ 07: @ 07:58  --------------------------------------------------------  IN: 32 mL / OUT: 40 mL / NET: -8 mL      CAPILLARY BLOOD GLUCOSE  157 (12 Sep 2017 04:48)          PHYSICAL EXAM:    General: NAD  HEENT: NC/AT; PERRL, clear conjunctiva  Neck: supple  Respiratory: CTA b/l  Cardiovascular: +S1/S2; RRR  Abdomen: soft, NT/ND; +BS x4  Extremities: WWP, 2+ peripheral pulses b/l; no LE edema  Skin: normal color and turgor; no rash  Neurological:    MEDICATIONS:  MEDICATIONS  (STANDING):  atorvastatin 80 milliGRAM(s) Oral at bedtime  tiotropium 18 MICROgram(s) Capsule 1 Capsule(s) Inhalation daily  buDESOnide 160 MICROgram(s)/formoterol 4.5 MICROgram(s) Inhaler 2 Puff(s) Inhalation two times a day  enoxaparin Injectable 40 milliGRAM(s) SubCutaneous every 24 hours  azithromycin   Tablet 500 milliGRAM(s) Oral daily  losartan 25 milliGRAM(s) Oral daily  propofol Infusion 30 MICROgram(s)/kG/Min (17.478 mL/Hr) IV Continuous <Continuous>  valproate sodium IVPB 500 milliGRAM(s) IV Intermittent every 8 hours  predniSONE   Tablet 40 milliGRAM(s) Oral daily  phenylephrine    Infusion 0.275 MICROgram(s)/kG/Min (5 mL/Hr) IV Continuous <Continuous>  piperacillin/tazobactam IVPB. 3.375 Gram(s) IV Intermittent every 12 hours  chlorhexidine 4% Liquid 1 Application(s) Topical daily  petrolatum Ophthalmic Ointment 1 Application(s) Both EYES two times a day    MEDICATIONS  (PRN):  diazepam    Tablet 5 milliGRAM(s) Oral every 12 hours PRN muscle spasm  acetaminophen    Suspension 650 milliGRAM(s) Oral every 6 hours PRN For Temp greater than 38 C (100.4 F)      ALLERGIES:  Allergies    No Known Allergies    Intolerances        LABS:                        13.1   15.15 )-----------( 161      ( 13 Sep 2017 05:50 )             41.8     09-13    143  |  102  |  24<H>  ----------------------------<  87  4.5   |  30  |  1.16    Ca    9.1      13 Sep 2017 05:50  Phos  3.8     09-  Mg     2.2     -    TPro  7.5  /  Alb  3.5  /  TBili  0.4  /  DBili  x   /  AST  17  /  ALT  9   /  AlkPhos  51  -13    PT/INR - ( 12 Sep 2017 04:15 )   PT: 13.1 SEC;   INR: 1.17          PTT - ( 12 Sep 2017 04:15 )  PTT:24.3 SEC  Urinalysis Basic - ( 11 Sep 2017 09:34 )    Color: YELLOW / Appearance: CLEAR / S.029 / pH: 6.0  Gluc: NEGATIVE / Ketone: TRACE  / Bili: NEGATIVE / Urobili: NORMAL E.U.   Blood: NEGATIVE / Protein: 10 / Nitrite: NEGATIVE   Leuk Esterase: NEGATIVE / RBC: 0-2 / WBC 0-2   Sq Epi: x / Non Sq Epi: x / Bacteria: x        RADIOLOGY & ADDITIONAL TESTS: Reviewed. INTERVAL HPI/OVERNIGHT EVENTS: No events overnight, no seizures recorded on EEG.     SUBJECTIVE: Patient seen and examined at bedside.     Unable to obtain ROS as pt was intubated and sedated    OBJECTIVE:    VITAL SIGNS:  ICU Vital Signs Last 24 Hrs  T(C): 36.2 (13 Sep 2017 04:00), Max: 38.3 (12 Sep 2017 20:00)  T(F): 97.2 (13 Sep 2017 04:00), Max: 101 (12 Sep 2017 20:00)  HR: 93 (13 Sep 2017 07:) (72 - 128)  BP: 107/78 (13 Sep 2017 07:) (83/65 - 134/100)  BP(mean): 85 (13 Sep 2017 07:) (63 - 105)  RR: 32 (13 Sep 2017 07:) (12 - 32)  SpO2: 98% (13 Sep 2017 07:) (97% - 100%)    Mode: AC/ CMV (Assist Control/ Continuous Mandatory Ventilation), RR (machine): 12, TV (machine): 500, FiO2: 40, PEEP: 5, MAP: 11.8, PIP: 27     @ 07: @ 07:00  --------------------------------------------------------  IN: 1075.5 mL / OUT: 1985 mL / NET: -909.5 mL     @ 07: @ 07:58  --------------------------------------------------------  IN: 32 mL / OUT: 40 mL / NET: -8 mL      CAPILLARY BLOOD GLUCOSE  157 (12 Sep 2017 04:48)          PHYSICAL EXAM:    GENERAL APPEARANCE: Well developed, well nourished, NAD.   HEENT:  NC/AT, clear conjunctiva  NECK: Neck supple, non-tender without lymphadenopathy, masses  CARDIAC: Normal S1 and S2. No S3, S4 or murmurs. Rhythm is regular.  LUNGS: Scattered expiratory wheezes  ABDOMEN: Soft, nondistended, nontender. No guarding or rebound.   MUSKULOSKELETAL: No joint erythema or tenderness.   EXTREMITIES: No edema.  NEUROLOGICAL: sedated  SKIN: Skin clean, dry, intact        MEDICATIONS:  MEDICATIONS  (STANDING):  atorvastatin 80 milliGRAM(s) Oral at bedtime  tiotropium 18 MICROgram(s) Capsule 1 Capsule(s) Inhalation daily  buDESOnide 160 MICROgram(s)/formoterol 4.5 MICROgram(s) Inhaler 2 Puff(s) Inhalation two times a day  enoxaparin Injectable 40 milliGRAM(s) SubCutaneous every 24 hours  azithromycin   Tablet 500 milliGRAM(s) Oral daily  losartan 25 milliGRAM(s) Oral daily  propofol Infusion 30 MICROgram(s)/kG/Min (17.478 mL/Hr) IV Continuous <Continuous>  valproate sodium IVPB 500 milliGRAM(s) IV Intermittent every 8 hours  predniSONE   Tablet 40 milliGRAM(s) Oral daily  phenylephrine    Infusion 0.275 MICROgram(s)/kG/Min (5 mL/Hr) IV Continuous <Continuous>  piperacillin/tazobactam IVPB. 3.375 Gram(s) IV Intermittent every 12 hours  chlorhexidine 4% Liquid 1 Application(s) Topical daily  petrolatum Ophthalmic Ointment 1 Application(s) Both EYES two times a day    MEDICATIONS  (PRN):  diazepam    Tablet 5 milliGRAM(s) Oral every 12 hours PRN muscle spasm  acetaminophen    Suspension 650 milliGRAM(s) Oral every 6 hours PRN For Temp greater than 38 C (100.4 F)      ALLERGIES:  Allergies    No Known Allergies    Intolerances        LABS:                        13.1   15.15 )-----------( 161      ( 13 Sep 2017 05:50 )             41.8         143  |  102  |  24<H>  ----------------------------<  87  4.5   |  30  |  1.16    Ca    9.1      13 Sep 2017 05:50  Phos  3.8       Mg     2.2         TPro  7.5  /  Alb  3.5  /  TBili  0.4  /  DBili  x   /  AST  17  /  ALT  9   /  AlkPhos  51      PT/INR - ( 12 Sep 2017 04:15 )   PT: 13.1 SEC;   INR: 1.17          PTT - ( 12 Sep 2017 04:15 )  PTT:24.3 SEC  Urinalysis Basic - ( 11 Sep 2017 09:34 )    Color: YELLOW / Appearance: CLEAR / S.029 / pH: 6.0  Gluc: NEGATIVE / Ketone: TRACE  / Bili: NEGATIVE / Urobili: NORMAL E.U.   Blood: NEGATIVE / Protein: 10 / Nitrite: NEGATIVE   Leuk Esterase: NEGATIVE / RBC: 0-2 / WBC 0-2   Sq Epi: x / Non Sq Epi: x / Bacteria: x        RADIOLOGY & ADDITIONAL TESTS: Reviewed.

## 2017-09-13 NOTE — PROGRESS NOTE ADULT - SUBJECTIVE AND OBJECTIVE BOX
Date of Admission:    24H hour events:     MEDICATIONS:  enoxaparin Injectable 40 milliGRAM(s) SubCutaneous every 24 hours  phenylephrine    Infusion 0.275 MICROgram(s)/kG/Min IV Continuous <Continuous>    piperacillin/tazobactam IVPB. 3.375 Gram(s) IV Intermittent every 12 hours    buDESOnide 160 MICROgram(s)/formoterol 4.5 MICROgram(s) Inhaler 2 Puff(s) Inhalation two times a day  tiotropium 18 MICROgram(s) Capsule 1 Capsule(s) Inhalation daily  ALBUTerol/ipratropium for Nebulization 3 milliLiter(s) Nebulizer every 6 hours PRN    valproate sodium IVPB 500 milliGRAM(s) IV Intermittent every 8 hours  acetaminophen    Suspension 650 milliGRAM(s) Oral every 6 hours PRN      atorvastatin 80 milliGRAM(s) Oral at bedtime  predniSONE   Tablet 40 milliGRAM(s) Oral daily    chlorhexidine 4% Liquid 1 Application(s) Topical daily      REVIEW OF SYSTEMS:  Complete 10point ROS negative.    PHYSICAL EXAM:  T(C): 36.9 (09-13-17 @ 12:00), Max: 38.3 (09-12-17 @ 20:00)  HR: 82 (09-13-17 @ 13:00) (72 - 120)  BP: 100/67 (09-13-17 @ 13:00) (82/61 - 126/94)  RR: 18 (09-13-17 @ 13:00) (12 - 32)  SpO2: 95% (09-13-17 @ 13:00) (94% - 100%)  Wt(kg): --  I&O's Summary    12 Sep 2017 07:01  -  13 Sep 2017 07:00  --------------------------------------------------------  IN: 1075.5 mL / OUT: 1985 mL / NET: -909.5 mL    13 Sep 2017 07:01  -  13 Sep 2017 13:39  --------------------------------------------------------  IN: 55 mL / OUT: 275 mL / NET: -220 mL        Appearance: Normal	  HEENT:   Normal oral mucosa, PERRL, EOMI	  Lymphatic: No lymphadenopathy  Cardiovascular: Normal S1 S2, No JVD, No murmurs, No edema  Respiratory: Lungs clear to auscultation	  Psychiatry: A & O x 3, Mood & affect appropriate  Gastrointestinal:  Soft, Non-tender, + BS	  Skin: No rashes, No ecchymoses, No cyanosis	  Neurologic: Non-focal  Extremities: Normal range of motion, No clubbing, cyanosis or edema  Vascular: Peripheral pulses palpable 2+ bilaterally        LABS:	 	    CBC Full  -  ( 13 Sep 2017 05:50 )  WBC Count : 15.15 K/uL  Hemoglobin : 13.1 g/dL  Hematocrit : 41.8 %  Platelet Count - Automated : 161 K/uL  Mean Cell Volume : 90.1 fL  Mean Cell Hemoglobin : 28.2 pg  Mean Cell Hemoglobin Concentration : 31.3 %  Auto Neutrophil # : 11.18 K/uL  Auto Lymphocyte # : 2.67 K/uL  Auto Monocyte # : 1.23 K/uL  Auto Eosinophil # : 0.00 K/uL  Auto Basophil # : 0.02 K/uL  Auto Neutrophil % : 73.9 %  Auto Lymphocyte % : 17.6 %  Auto Monocyte % : 8.1 %  Auto Eosinophil % : 0.0 %  Auto Basophil % : 0.1 %    09-13    143  |  102  |  24<H>  ----------------------------<  87  4.5   |  30  |  1.16  09-12    139  |  99  |  21  ----------------------------<  181<H>  4.4   |  22  |  1.22    Ca    9.1      13 Sep 2017 05:50  Ca    9.7      12 Sep 2017 04:15  Phos  3.8     09-13  Mg     2.2     09-13    TPro  7.5  /  Alb  3.5  /  TBili  0.4  /  DBili  x   /  AST  17  /  ALT  9   /  AlkPhos  51  09-13    TELEMETRY: 	  SR 60-100s      	  ASSESSMENT/PLAN: 	    47 year old male with history of HTN, COPD, asthma, sarcoidosis with hx of pneumothoracies cardiology consulted for EKG changes course complicated by seizure activity requiring intubation currently in MICU.  Serial trops negative. Dynamic  EKG changes seen in setting of likely seizure;  Serial trops negative; Clinical scenario less likely related to ACS. TTE with grossly reduced LV function.     - Pending neuro work up and stabilization, will consider further ischemic eval given depressed LV function. May be stress induced cardiomyopathy pending further work up.     [id number]

## 2017-09-14 ENCOUNTER — TRANSCRIPTION ENCOUNTER (OUTPATIENT)
Age: 47
End: 2017-09-14

## 2017-09-14 DIAGNOSIS — R56.9 UNSPECIFIED CONVULSIONS: ICD-10-CM

## 2017-09-14 DIAGNOSIS — I63.9 CEREBRAL INFARCTION, UNSPECIFIED: ICD-10-CM

## 2017-09-14 LAB
BASOPHILS # BLD AUTO: 0.02 K/UL — SIGNIFICANT CHANGE UP (ref 0–0.2)
BASOPHILS NFR BLD AUTO: 0.2 % — SIGNIFICANT CHANGE UP (ref 0–2)
BUN SERPL-MCNC: 28 MG/DL — HIGH (ref 7–23)
CALCIUM SERPL-MCNC: 9.2 MG/DL — SIGNIFICANT CHANGE UP (ref 8.4–10.5)
CHLORIDE SERPL-SCNC: 102 MMOL/L — SIGNIFICANT CHANGE UP (ref 98–107)
CO2 SERPL-SCNC: 31 MMOL/L — SIGNIFICANT CHANGE UP (ref 22–31)
CREAT SERPL-MCNC: 1.1 MG/DL — SIGNIFICANT CHANGE UP (ref 0.5–1.3)
EOSINOPHIL # BLD AUTO: 0.03 K/UL — SIGNIFICANT CHANGE UP (ref 0–0.5)
EOSINOPHIL NFR BLD AUTO: 0.3 % — SIGNIFICANT CHANGE UP (ref 0–6)
ERYTHROCYTE [SEDIMENTATION RATE] IN BLOOD: 22 MM/HR — HIGH (ref 1–15)
GLUCOSE SERPL-MCNC: 83 MG/DL — SIGNIFICANT CHANGE UP (ref 70–99)
HCT VFR BLD CALC: 42.2 % — SIGNIFICANT CHANGE UP (ref 39–50)
HGB BLD-MCNC: 13.3 G/DL — SIGNIFICANT CHANGE UP (ref 13–17)
IMM GRANULOCYTES # BLD AUTO: 0.04 # — SIGNIFICANT CHANGE UP
IMM GRANULOCYTES NFR BLD AUTO: 0.4 % — SIGNIFICANT CHANGE UP (ref 0–1.5)
LYMPHOCYTES # BLD AUTO: 2.12 K/UL — SIGNIFICANT CHANGE UP (ref 1–3.3)
LYMPHOCYTES # BLD AUTO: 20.5 % — SIGNIFICANT CHANGE UP (ref 13–44)
MAGNESIUM SERPL-MCNC: 2.2 MG/DL — SIGNIFICANT CHANGE UP (ref 1.6–2.6)
MCHC RBC-ENTMCNC: 28.9 PG — SIGNIFICANT CHANGE UP (ref 27–34)
MCHC RBC-ENTMCNC: 31.5 % — LOW (ref 32–36)
MCV RBC AUTO: 91.7 FL — SIGNIFICANT CHANGE UP (ref 80–100)
MONOCYTES # BLD AUTO: 1.27 K/UL — HIGH (ref 0–0.9)
MONOCYTES NFR BLD AUTO: 12.3 % — SIGNIFICANT CHANGE UP (ref 2–14)
NEUTROPHILS # BLD AUTO: 6.88 K/UL — SIGNIFICANT CHANGE UP (ref 1.8–7.4)
NEUTROPHILS NFR BLD AUTO: 66.3 % — SIGNIFICANT CHANGE UP (ref 43–77)
NRBC # FLD: 0 — SIGNIFICANT CHANGE UP
PHOSPHATE SERPL-MCNC: 3.3 MG/DL — SIGNIFICANT CHANGE UP (ref 2.5–4.5)
PLATELET # BLD AUTO: 133 K/UL — LOW (ref 150–400)
PMV BLD: 12.7 FL — SIGNIFICANT CHANGE UP (ref 7–13)
POTASSIUM SERPL-MCNC: 4.2 MMOL/L — SIGNIFICANT CHANGE UP (ref 3.5–5.3)
POTASSIUM SERPL-SCNC: 4.2 MMOL/L — SIGNIFICANT CHANGE UP (ref 3.5–5.3)
RBC # BLD: 4.6 M/UL — SIGNIFICANT CHANGE UP (ref 4.2–5.8)
RBC # FLD: 13.1 % — SIGNIFICANT CHANGE UP (ref 10.3–14.5)
SODIUM SERPL-SCNC: 143 MMOL/L — SIGNIFICANT CHANGE UP (ref 135–145)
WBC # BLD: 10.36 K/UL — SIGNIFICANT CHANGE UP (ref 3.8–10.5)
WBC # FLD AUTO: 10.36 K/UL — SIGNIFICANT CHANGE UP (ref 3.8–10.5)

## 2017-09-14 PROCEDURE — 99233 SBSQ HOSP IP/OBS HIGH 50: CPT

## 2017-09-14 PROCEDURE — 99291 CRITICAL CARE FIRST HOUR: CPT

## 2017-09-14 PROCEDURE — 93970 EXTREMITY STUDY: CPT | Mod: 26

## 2017-09-14 RX ORDER — PIPERACILLIN AND TAZOBACTAM 4; .5 G/20ML; G/20ML
3.38 INJECTION, POWDER, LYOPHILIZED, FOR SOLUTION INTRAVENOUS EVERY 8 HOURS
Qty: 0 | Refills: 0 | Status: DISCONTINUED | OUTPATIENT
Start: 2017-09-14 | End: 2017-09-17

## 2017-09-14 RX ADMIN — ATORVASTATIN CALCIUM 80 MILLIGRAM(S): 80 TABLET, FILM COATED ORAL at 21:33

## 2017-09-14 RX ADMIN — Medication 40 MILLIGRAM(S): at 05:28

## 2017-09-14 RX ADMIN — Medication 81 MILLIGRAM(S): at 11:29

## 2017-09-14 RX ADMIN — PIPERACILLIN AND TAZOBACTAM 25 GRAM(S): 4; .5 INJECTION, POWDER, LYOPHILIZED, FOR SOLUTION INTRAVENOUS at 05:28

## 2017-09-14 RX ADMIN — Medication 27.5 MILLIGRAM(S): at 21:33

## 2017-09-14 RX ADMIN — ENOXAPARIN SODIUM 40 MILLIGRAM(S): 100 INJECTION SUBCUTANEOUS at 11:29

## 2017-09-14 RX ADMIN — Medication 27.5 MILLIGRAM(S): at 13:32

## 2017-09-14 RX ADMIN — PIPERACILLIN AND TAZOBACTAM 25 GRAM(S): 4; .5 INJECTION, POWDER, LYOPHILIZED, FOR SOLUTION INTRAVENOUS at 21:33

## 2017-09-14 RX ADMIN — Medication 27.5 MILLIGRAM(S): at 05:28

## 2017-09-14 RX ADMIN — CHLORHEXIDINE GLUCONATE 1 APPLICATION(S): 213 SOLUTION TOPICAL at 18:04

## 2017-09-14 RX ADMIN — PIPERACILLIN AND TAZOBACTAM 25 GRAM(S): 4; .5 INJECTION, POWDER, LYOPHILIZED, FOR SOLUTION INTRAVENOUS at 13:32

## 2017-09-14 NOTE — OCCUPATIONAL THERAPY INITIAL EVALUATION ADULT - PLANNED THERAPY INTERVENTIONS, OT EVAL
neuromuscular re-education/strengthening/ROM/transfer training/ADL retraining/bed mobility training/balance training/fine motor coordination training

## 2017-09-14 NOTE — OCCUPATIONAL THERAPY INITIAL EVALUATION ADULT - DIAGNOSIS, OT EVAL
lethargy, LUE/ LLE weakness, decreased visual tracking/scanning to left, decreased postural control, decreased sitting balance, decreased ADL independence, decreased functional mobility

## 2017-09-14 NOTE — CHART NOTE - NSCHARTNOTEFT_GEN_A_CORE
Discussed with patient's occupational health doctor, Dr. Lelia Godinez, who he follows at [facility] (via [employer], [phone]). Patient was diagnosed in 2006 with sarcoid and COPD. Patient has only been on oral steroids for sarcoids and pulmonary mediations for COPD (Symbicort, Incruse, albuterol prn). Patient had breathing test in 8/2017 with FEV/FVC of 45% at that time. Patient also had TTE on 1/2017 with EF of 47% at that time, no mention of PFO on report. Patient also had ophtho exam on 5/2016 with no mention of cardiac manifestations. Patient's pulmonary doctor is Dr. Dwight Welsh. Patient also had history of mycetoma infection in past with hx of hemoptysis.     Elva Deleon MD  PGY3 Internal Medicine Resident  Pager: [phone]

## 2017-09-14 NOTE — DISCHARGE NOTE ADULT - NS AS ACTIVITY OBS
Showering allowed/Walking-Outdoors allowed/Stairs allowed/Do not make important decisions/No Heavy lifting/straining/Walking-Indoors allowed/Do not drive or operate machinery No Heavy lifting/straining/Showering allowed/Stairs allowed/Do not make important decisions/Walking-Outdoors allowed/Sex allowed/Do not drive or operate machinery/Walking-Indoors allowed

## 2017-09-14 NOTE — DISCHARGE NOTE ADULT - HOSPITAL COURSE
47 year old male with a PMHx of sarcoidosis, HTN, asthma, and COPD presents to the ED with severe leg spasms and headache leading to a presyncopal episode, found to have a significant abnormal EKG and moderate inspiratory and expiratory wheezing in the setting of Entero/Rhino virus, admitted to tele for Near Syncope, r/o ACS and COPD exacerbation.  During Hospital Course:  Pt was seen by Pulmonary: Pt was started on IV Steroids  On 9/12 at 4 am, a RRT was  called on patient as he became unresponsive. Last known normal was around 4am when he walked to the bathroom.  Stroke code called at 4:10am for facial droop. Patient was noted to have eyes deviated to the right, urinary incontinence, and tongue trauma. Ativan 2 mg given for possible seizure.  Patient continued with labored breathing, requiring intubation. Transferred to ICU for Monitoring, Pt was started on Valproic acid  s/p Head Ct and CTA of Head and Neck which were unremarkable  s/p MRI Brain: Areas of acute infarction involve the right greater than left frontal, parietal, and occipital lobes, without hemorrhagic transformation  MRA head/neck: MRA of the neck and [place] is somewhat limited by motion though grossly unremarkable    CT chest consistent with sarcoid findings: bilateral centrilobular nodules, mediastinal and hilar KORI  Pt was successfully  Extubated on 9/13 47 year old male with a PMHx of sarcoidosis, HTN, asthma, and COPD presents to the ED with severe leg spasms and headache leading to a presyncopal episode, found to have a significant abnormal EKG and moderate inspiratory and expiratory wheezing in the setting of Entero/Rhino virus, admitted to tele for Near Syncope, r/o ACS and COPD exacerbation.  During Hospital Course:  Pt was seen by Pulmonary: Pt was started on IV Steroids  On 9/12 at 4 am, a RRT was  called on patient as he became unresponsive. Last known normal was around 4am when he walked to the bathroom.  Stroke code called at 4:10am for facial droop. Patient was noted to have eyes deviated to the right, urinary incontinence, and tongue trauma. Ativan 2 mg given for possible seizure.  Patient continued with labored breathing, requiring intubation. Transferred to ICU for Monitoring, Pt was started on Valproic acid  s/p Head Ct and CTA of Head and Neck which were unremarkable  s/p MRI Brain: Areas of acute infarction involve the right greater than left frontal, parietal, and occipital lobes, without hemorrhagic transformation  MRA head/neck: MRA of the neck and [place] is somewhat limited by motion though grossly unremarkable    CT chest consistent with sarcoid findings: bilateral centrilobular nodules, mediastinal and hilar KORI  Pt was successfully  Extubated on 9/13  PAF- AC on hold due to hemoptysis; 9/21-FB/BAL done by Dr. Arias. Intraop: Bleeding from DAVID (non-lingular)-Recom IR to embolize; 9/22 CT angio done per IR Lobko request.  Formal PFTS and ask Card to fix PFO now to decrease blood flow; 9/24- Developed worsening hemoptysis. Emergently kristel to OR and had  Left thoractomy DAVID. Brought to CTICU. Pt on thoracic service now 9/24: 1400ml EBL in OR; 10 u PRBCs;9/25-Brought back to OR for bleeding. Had Reop Thoracotomy, evac left hemothorax. CTI- DAVID space despite -40 Sxn, MEYER, L Hemiparesis (CVA) better w walking; F/u ID- Voriconazole to 11/4/17; 10/6 IR L PTC for PTX; 10/13 PTC to water seal CXR with incr PTX -> back to suction at all times; 10/14: sutured leaking old c-tube site; 10/15 CXR small PTX no change 10/16 WS trial failed-> back to sxn;  10/18 failed WS; blood patch 10/19-> on sxn, CXR stable but still AL; 10/21 C-tube to WS & pt got SOB & needed O2, back on SXN & Air leak is back. LLE swelling, Dopplers [id number] Bedside talc pleurodesis tolerated, AL resolved;  PLT -> stable at 79; Tachy & hypoTN - 1 L NS, albumin, sob- lasix 20 IVx1. Developed SIRS after pleurodesis-> CTICU; On pressors. Pt. clinically improved in CTICU. Maintained on Steroids, weaned down to Solumedrol BID. Off pressors and transitioned to Midodrine. Still with thrombocytopenia but no bleeding. Continued on Eliquis but no ASA for h/o CVA. Neuro deficits improving. Cont antifungals. Air leak resolved on 10/27. CXR- on WS- lung expanded, No AL still. 10/28-Transferred back to [facility]. 10/29- Left PTC clamped, FU CXR no ptx  10/30 Chest tube removed, cxr stable.  Steriods to po taper.  Patient stable for discharge. 47 year old male with a PMHx of sarcoidosis, HTN, asthma, and COPD presents to the ED with severe leg spasms and headache leading to a presyncopal episode, found to have a significant abnormal EKG and moderate inspiratory and expiratory wheezing in the setting of Entero/Rhino virus, admitted to tele for Near Syncope, r/o ACS and COPD exacerbation.  During Hospital Course:  Pt was seen by Pulmonary: Pt was started on IV Steroids  On 9/12 at 4 am, a RRT was  called on patient as he became unresponsive. Last known normal was around 4am when he walked to the bathroom.  Stroke code called at 4:10am for facial droop. Patient was noted to have eyes deviated to the right, urinary incontinence, and tongue trauma. Ativan 2 mg given for possible seizure.  Patient continued with labored breathing, requiring intubation. Transferred to ICU for Monitoring, Pt was started on Valproic acid  s/p Head Ct and CTA of Head and Neck which were unremarkable  s/p MRI Brain: Areas of acute infarction involve the right greater than left frontal, parietal, and occipital lobes, without hemorrhagic transformation  MRA head/neck: MRA of the neck and [place] is somewhat limited by motion though grossly unremarkable    CT chest consistent with sarcoid findings: bilateral centrilobular nodules, mediastinal and hilar KORI  Pt was successfully  Extubated on 9/13  PAF- AC on hold due to hemoptysis; 9/21-FB/BAL done by Dr. Arias. Intraop: Bleeding from DAVID (non-lingular)-Recom IR to embolize; 9/22 CT angio done per IR Lobko request.  Formal PFTS and ask Card to fix PFO now to decrease blood flow; 9/24- Developed worsening hemoptysis. Emergently kristel to OR and had  Left thoractomy DAVID. Brought to CTICU. Pt on thoracic service now 9/24: 1400ml EBL in OR; 10 u PRBCs;9/25-Brought back to OR for bleeding. Had Reop Thoracotomy, evac left hemothorax. CTI- DAVID space despite -40 Sxn, MEYER, L Hemiparesis (CVA) better w walking; F/u ID- Voriconazole to 11/4/17; 10/6 IR L PTC for PTX; 10/13 PTC to water seal CXR with incr PTX -> back to suction at all times; 10/14: sutured leaking old c-tube site; 10/15 CXR small PTX no change 10/16 WS trial failed-> back to sxn;  10/18 failed WS; blood patch 10/19-> on sxn, CXR stable but still AL; 10/21 C-tube to WS & pt got SOB & needed O2, back on SXN & Air leak is back. LLE swelling, Dopplers [id number] Bedside talc pleurodesis tolerated, AL resolved;  PLT -> stable at 79; Tachy & hypoTN - 1 L NS, albumin, sob- lasix 20 IVx1. Developed SIRS after pleurodesis-> CTICU; On pressors. Pt. clinically improved in CTICU. Maintained on Steroids, weaned down to Solumedrol BID. Off pressors and transitioned to Midodrine. Still with thrombocytopenia but no bleeding. Continued on Eliquis but no ASA for h/o CVA. Neuro deficits improving. Cont antifungals. Air leak resolved on 10/27. CXR- on WS- lung expanded, No AL still. 10/28-Transferred back to [facility]. 10/29- Left PTC clamped, FU CXR no ptx  10/30 Chest tube removed, cxr stable.  Steriods to po taper.  Patient stable for discharge.  11/2-Pt ambulating w PT frequently. Feels well. Pain controlled. All labs and vital signs stable. Wound healing. Arnold po steroid taper. To complete Voriconazole on Nov 4th. CXRs and reports reviewed by and cleared by Dr. Caldera. Pt. to fu for PFO closure as output. Still requires intermittent oxygen. Cleared for discharge by Dr. Caldera to acute rehab today.

## 2017-09-14 NOTE — DISCHARGE NOTE ADULT - CARE PROVIDER_API CALL
Rodrigue Caldera), Thoracic Surgery  [address] [place] [address]  Phone: [phone]  Fax: [phone] Rodrigue Caldera), Thoracic Surgery  [address] [place] [address]  Phone: [phone]  Fax: [phone]    Desi Dominguez), Cardiology; Internal Medicine; Interventional Cardiology  [facility] Dept of Cardiology  [address] [place] [address]  Phone: [phone]  Fax: [phone] Rodrigue Caldera), Thoracic Surgery  [address] [address]  Phone: [phone]  Fax: [phone]    Desi Dominguez), Cardiology; Internal Medicine; Interventional Cardiology  [facility] Dept of Cardiology  [address] [place] [address]  Phone: [phone]  Fax: [phone]    Demetrius Felipe), Infectious Disease  [address] [place] [address]  Phone: [phone]  Fax: [phone]    Chang Riley), Critical Care Medicine; Internal Medicine; Pulmonary Disease; Sleep Medicine  [address] [place] [address]  Phone: [phone]  Fax: [phone]

## 2017-09-14 NOTE — PROGRESS NOTE ADULT - PROBLEM SELECTOR PLAN 1
extubated: mri NOTED: DEFER TO NEUROLOGY  ? PFO: await offcial echo  folow up eeg: neurology folowing?ken

## 2017-09-14 NOTE — DISCHARGE NOTE ADULT - CARE PROVIDERS DIRECT ADDRESSES
,[email or web address] ,[email or web address] [email or web address] ,[email or web address] [email or web address] [email or web address] [email or web address] [email or web address],DirectAddress_Unknown

## 2017-09-14 NOTE — PROGRESS NOTE ADULT - ASSESSMENT
47M w/ sarcoidosis, HTN, COPD, and asthma complicated by pneumothoraces x3 admitted to MICU after intubation for airway protection from seizure like activity.     Neuro   -EEG w/ no seizures recorded. MRI showing: areas of acute infarction involve the right greater than left frontal, parietal, and occipital lobes, without hemorrhagic transformation. Neuro recommends with MRI brain showing acute infarcts in the MCA and PCA distribution consistent with watershed type infarcts, and recommended, ESR, CRP, and JONATAN, and long term Holter monitoring to assess atrial fibrillation. c/w depacon     CVS - Pt w/ dynamic EKG and ASA/plavix loaded initially. CE negative x3. Unclear etiology for EKG changes although doubt ACS given no cardiac enzyme elevation. Cardiology with low suspicion for ACS, may consider further ischemic evaluation after stabilization.     Pulm - Patient extubated yesterday. CT chest consistent with sarcoid findings: bilateral centrilobular nodules, mediastinal and hilar KORI    GI - Tolerating regular diet    Renal - no active issues    Hem/onc - no active issues. 47M with a PMH of sarcoidosis, HTN, COPD, and asthma complicated by pneumothoraces x3 who presented to the ED with severe leg spasms on 9/10. Began suddenly two days prior in the patient's home and have become more regular/erratic and worsening in contracture lasting seconds to minutes associated with intense tingling and pain sensation on left side of body and HA throughout head. Neurology was consulted and recommended CT head to r/o bleed. CTA of head and neck were done and normal.     On admission he was also noted to have abnormal EKG with TWI in V1,V2, biphasic T waves in V3-V5 and cardiology was consulted. CE were negative, asa and plavix load per cardiology with EKG and CE monitoring. He tested positive for entero/rhinovirus and had wheezing on exam. He was started on duonebs, steroids, azithromycin. Pulmonary was consulted for complicated lung history with current wheezing. Recommended CT chest for history of sarcoid which was obtained yesterday.     Overnight on 9/11 patient became unresponsive with last known normal at 4AM. Stroke code was called at 4:10AM for facial droop. On exam, patient had incontinence and tongue trauma and was given ativan 2mg x2 for possible seizure however did not improve. He continued to be obtunded with labored breathing and was intubated for airway protection.    Neuro - possible recent seizure vs stroke  - now intubated and sedated  - neurology following, will load with valproate and obtain video EEG, will order MRI  - f/u CT head    CVS - new onset afib with EKG changes  - admitted with TWI and bipashic T waves, now having MICHELLE in V2-V3  - CE have been negative x3  - Cardiology following, if CT head negative for bleed, can start ACS anticoagulation   - c/w asa and plavix  - started on phenylephrine due to hypotension with intubation    Pulm - new obtundation this AM, intubated and sedated  - CT chest consistent with sarcoid findings: bilateral centrilobular nodules, mediastinal and hilar KORI    GI - NPO s/p intubation    Renal - no active issues    Hem/onc - no active issues    ID - Leukocytosis down trending, likely stress induced from seizures. Febrile yesterday. Was on zosyn and azithro (two days of coverage) for empiric coverage for aspiration PNA and atypicals. Day 3 of zosyn. Blood culture from 9/12 NGTD.     DVT ppx - Lovenox 47M w/ sarcoidosis, HTN, COPD, and asthma complicated by pneumothoraces x3 admitted to MICU after intubation for airway protection from seizure like activity, found to have MCA and PCA distribution.     Neuro   -EEG w/ no seizures recorded. MRI showing: areas of acute infarction involve the right greater than left frontal, parietal, and occipital lobes, without hemorrhagic transformation. Neuro recommends with MRI brain showing acute infarcts in the MCA and PCA distribution consistent with watershed type infarcts, and recommended, ESR, CRP, and JONATAN, and long term Holter monitoring to assess atrial fibrillation. Due to association with sarcoid and vasculitis, will also check initial workup for vasculitis. c/w depacon. Bedside echo + bubble study, will assess with official study for bubble study.     CVS - Pt w/ dynamic EKG and ASA/plavix loaded initially. CE negative x3. Unclear etiology for EKG changes although doubt ACS given no cardiac enzyme elevation. Cardiology with low suspicion for ACS, may consider further ischemic evaluation after stabilization.     Pulm - Patient extubated yesterday. CT chest consistent with sarcoid findings: bilateral centrilobular nodules, mediastinal and hilar KORI    GI - Tolerating regular diet    Renal - no active issues    Hem/onc - no active issues. 47M with a PMH of sarcoidosis, HTN, COPD, and asthma complicated by pneumothoraces x3 who presented to the ED with severe leg spasms on 9/10. Began suddenly two days prior in the patient's home and have become more regular/erratic and worsening in contracture lasting seconds to minutes associated with intense tingling and pain sensation on left side of body and HA throughout head. Neurology was consulted and recommended CT head to r/o bleed. CTA of head and neck were done and normal.     On admission he was also noted to have abnormal EKG with TWI in V1,V2, biphasic T waves in V3-V5 and cardiology was consulted. CE were negative, asa and plavix load per cardiology with EKG and CE monitoring. He tested positive for entero/rhinovirus and had wheezing on exam. He was started on duonebs, steroids, azithromycin. Pulmonary was consulted for complicated lung history with current wheezing. Recommended CT chest for history of sarcoid which was obtained yesterday.     Overnight on 9/11 patient became unresponsive with last known normal at 4AM. Stroke code was called at 4:10AM for facial droop. On exam, patient had incontinence and tongue trauma and was given ativan 2mg x2 for possible seizure however did not improve. He continued to be obtunded with labored breathing and was intubated for airway protection.    Neuro - possible recent seizure vs stroke  - now intubated and sedated  - neurology following, will load with valproate and obtain video EEG, will order MRI  - f/u CT head    CVS - new onset afib with EKG changes  - admitted with TWI and bipashic T waves, now having MICHELLE in V2-V3  - CE have been negative x3  - Cardiology following, if CT head negative for bleed, can start ACS anticoagulation   - c/w asa and plavix  - started on phenylephrine due to hypotension with intubation    Pulm - new obtundation this AM, intubated and sedated  - CT chest consistent with sarcoid findings: bilateral centrilobular nodules, mediastinal and hilar KORI    GI - NPO s/p intubation    Renal - no active issues    Hem/onc - no active issues    ID - Leukocytosis down trending, likely stress induced from seizures. Febrile yesterday. Was on zosyn and azithro (two days of coverage) for empiric coverage for aspiration PNA and atypicals. Day 3 of zosyn. Blood culture from 9/12 NGTD.     DVT ppx - Lovenox

## 2017-09-14 NOTE — PROGRESS NOTE ADULT - SUBJECTIVE AND OBJECTIVE BOX
Patient is a 47y old  Male who presents with a chief complaint of "My leg wouldn't stop shaking." (14 Sep 2017 09:59)    pt remains extubated: has CVA:? etiology being determined  he is not SOB or having wheezing       Any change in ROS:     MEDICATIONS  (STANDING):  atorvastatin 80 milliGRAM(s) Oral at bedtime  enoxaparin Injectable 40 milliGRAM(s) SubCutaneous every 24 hours  valproate sodium IVPB 500 milliGRAM(s) IV Intermittent every 8 hours  predniSONE   Tablet 40 milliGRAM(s) Oral daily  chlorhexidine 4% Liquid 1 Application(s) Topical daily  buDESOnide 160 MICROgram(s)/formoterol 4.5 MICROgram(s) Inhaler 2 Puff(s) Inhalation two times a day  tiotropium 18 MICROgram(s) Capsule 1 Capsule(s) Inhalation daily  aspirin  chewable 81 milliGRAM(s) Oral daily  piperacillin/tazobactam IVPB. 3.375 Gram(s) IV Intermittent every 8 hours    MEDICATIONS  (PRN):  acetaminophen    Suspension 650 milliGRAM(s) Oral every 6 hours PRN For Temp greater than 38 C (100.4 F)  ALBUTerol/ipratropium for Nebulization 3 milliLiter(s) Nebulizer every 6 hours PRN Shortness of Breath and/or Wheezing    Vital Signs Last 24 Hrs  T(C): 36.6 (14 Sep 2017 16:00), Max: 36.7 (14 Sep 2017 00:00)  T(F): 97.8 (14 Sep 2017 16:00), Max: 98 (14 Sep 2017 00:00)  HR: 82 (14 Sep 2017 16:00) (82 - 100)  BP: 111/84 (14 Sep 2017 16:00) (93/79 - 131/97)  BP(mean): 89 (14 Sep 2017 16:00) (76 - 103)  RR: 17 (14 Sep 2017 16:00) (12 - 19)  SpO2: 95% (14 Sep 2017 16:00) (92% - 98%)    I&O's Summary    13 Sep 2017 07:01  -  14 Sep 2017 07:00  --------------------------------------------------------  IN: 255 mL / OUT: 995 mL / NET: -740 mL    14 Sep 2017 07:01  -  14 Sep 2017 17:04  --------------------------------------------------------  IN: 600 mL / OUT: 390 mL / NET: 210 mL          Physical Exam:   GENERAL: NAD, well-groomed, well-developed  HEENT: GIOVANI/   Atraumatic, Normocephalic  ENMT: No tonsillar erythema, exudates, or enlargement; Moist mucous membranes, Good dentition, No lesions  NECK: Supple, No JVD, Normal thyroid  CHEST/LUNG: coarse rhionchi  CVS: Regular rate and rhythm; No murmurs, rubs, or gallops  GI: : Soft, Nontender, Nondistended; Bowel sounds present  NERVOUS SYSTEM:  some letharginess  EXTREMITIES:  2+ Peripheral Pulses, No clubbing, cyanosis, or edema  LYMPH: No lymphadenopathy noted  SKIN: No rashes or lesions  ENDOCRINOLOGY: No Thyromegaly  PSYCH: Appropriate    Labs:  25                            13.3   10.36 )-----------( 133      ( 14 Sep 2017 03:40 )             42.2                         13.1   15.15 )-----------( 161      ( 13 Sep 2017 05:50 )             41.8                         13.7   23.93 )-----------( 176      ( 12 Sep 2017 04:15 )             42.2                         13.3   10.25 )-----------( 155      ( 11 Sep 2017 11:38 )             41.0                         13.8   5.53  )-----------( 146      ( 10 Sep 2017 19:51 )             42.4     09-14    143  |  102  |  28<H>  ----------------------------<  83  4.2   |  31  |  1.10  09-13    143  |  102  |  24<H>  ----------------------------<  87  4.5   |  30  |  1.16  09-12    139  |  99  |  21  ----------------------------<  181<H>  4.4   |  22  |  1.22  09-11    140  |  99  |  15  ----------------------------<  121<H>  4.3   |  26  |  1.06  09-10    138  |  100  |  14  ----------------------------<  145<H>  4.7   |  23  |  1.06    Ca    9.2      14 Sep 2017 03:40  Ca    9.1      13 Sep 2017 05:50  Phos  3.3     09-14  Phos  3.8     09-13  Mg     2.2     09-14  Mg     2.2     09-13    TPro  7.5  /  Alb  3.5  /  TBili  0.4  /  DBili  x   /  AST  17  /  ALT  9   /  AlkPhos  51  09-13  TPro  9.1<H>  /  Alb  4.2  /  TBili  0.4  /  DBili  x   /  AST  21  /  ALT  16  /  AlkPhos  68  09-10    CAPILLARY BLOOD GLUCOSE          LIVER FUNCTIONS - ( 13 Sep 2017 05:50 )  Alb: 3.5 g/dL / Pro: 7.5 g/dL / ALK PHOS: 51 u/L / ALT: 9 u/L / AST: 17 u/L / GGT: x               Lactate, Blood: 2.0 mmol/L (09-13 @ 05:50)  Cultures:                       Rapid Respiratory Viral Panel Result        09-11 @ 02:00  Rapid RVP --  Coronovirus --  Adenovirus --  Bordetella Pertussis --  Chlamydia Pneumonia --  Entero/RhinovirusPOSITIVE  HKU1 Coronovirus --  HMPV Coronovirus --  Influenza A --  Influenza AH1 --  Influenza AH1 2009 --  Influenza AH3 --  Influenza B --  Mycoplasma Pneumoniae --  NL63 Coronovirus --  OC43 Coronovirus --  Parainfluenza 1 --  Parainfluenza 2 --  Parainfluenza 3 --  Parainfluenza 4 --  Resp Syncytial Virus --          Studies  Chest X-RAY  CT SCAN Chest   Venous Dopplers: LE:   CT Abdomen  Others      < from: MRI Head w/wo Cont (09.13.17 @ 16:16) >  EXAM:  MRI BRAIN W-W O CONTRAST        PROCEDURE DATE:  Sep 13 2017         INTERPRETATION:  HISTORY:   History of sarcoidosis. Patient became   unresponsive. Stroke versus seizure. Seizure-like activity.. .    Description: MRI of the brain with and without gadolinium contrast was   performed.    COMPARISON: Head CT 09/12/2017..    Sagittal T1, axial T1, T2, FLAIR, SWI, and diffusion-weighted series were   obtained before contrast. After intravenous gadolinium contrast   administration, sagittal, coronal, and axial T1 postcontrast series were   obtained.    The study is markedly limited by motion.    On the diffusion-weighted series and ADC maps, acute infarction involves   the right greater than left frontal, parietal, and occipital lobes,  without hemorrhagic transformation. Infarct appear in a watershed   distribution.    There is no gross evidence for large enhancing mass, acute hemorrhage, or   hydrocephalus within the limitations of this study.    The punctate foci of intracranial air on the prior head CT have grossly   resolved over the time interval.    I discussed the exam findings with Dr. Simons at 4:35 PM on 09/13/2017 with   read back.    IMPRESSION:    Areas of acute infarction involve the right greater than left frontal,   parietal, and occipital lobes, without hemorrhagic transformation.    < end of copied text >

## 2017-09-14 NOTE — PROGRESS NOTE ADULT - SUBJECTIVE AND OBJECTIVE BOX
Patient is a 47y old  Male who presents with a chief complaint of "My leg wouldn't stop shaking." (11 Sep 2017 07:22)      SUBJECTIVE / OVERNIGHT EVENTS:  Patient seen and examined at bedside. No acute events overnight.     MEDICATIONS  (STANDING):  atorvastatin 80 milliGRAM(s) Oral at bedtime  enoxaparin Injectable 40 milliGRAM(s) SubCutaneous every 24 hours  valproate sodium IVPB 500 milliGRAM(s) IV Intermittent every 8 hours  predniSONE   Tablet 40 milliGRAM(s) Oral daily  piperacillin/tazobactam IVPB. 3.375 Gram(s) IV Intermittent every 12 hours  chlorhexidine 4% Liquid 1 Application(s) Topical daily  buDESOnide 160 MICROgram(s)/formoterol 4.5 MICROgram(s) Inhaler 2 Puff(s) Inhalation two times a day  tiotropium 18 MICROgram(s) Capsule 1 Capsule(s) Inhalation daily  aspirin  chewable 81 milliGRAM(s) Oral daily    MEDICATIONS  (PRN):  acetaminophen    Suspension 650 milliGRAM(s) Oral every 6 hours PRN For Temp greater than 38 C (100.4 F)  ALBUTerol/ipratropium for Nebulization 3 milliLiter(s) Nebulizer every 6 hours PRN Shortness of Breath and/or Wheezing        CAPILLARY BLOOD GLUCOSE        I&O's Summary    13 Sep 2017 07:01  -  14 Sep 2017 07:00  --------------------------------------------------------  IN: 255 mL / OUT: 995 mL / NET: -740 mL      ICU Vital Signs Last 24 Hrs  T(C): 36.4 (14 Sep 2017 04:00), Max: 37.2 (13 Sep 2017 16:20)  T(F): 97.5 (14 Sep 2017 04:00), Max: 98.9 (13 Sep 2017 16:20)  HR: 100 (14 Sep 2017 06:00) (74 - 110)  BP: 115/95 (14 Sep 2017 06:00) (82/61 - 126/84)  BP(mean): 100 (14 Sep 2017 06:00) (64 - 100)  ABP: --  ABP(mean): --  RR: 12 (14 Sep 2017 06:00) (12 - 27)  SpO2: 93% (14 Sep 2017 06:00) (93% - 99%)    PHYSICAL EXAM:  GENERAL: NAD, well-developed  HEAD:  Atraumatic, Normocephalic  EYES: EOMI,  conjunctiva and sclera clear  NECK: Supple  CHEST/LUNG: Clear to auscultation bilaterally; No wheeze  HEART: Regular rate and rhythm; No murmurs, rubs, or gallops  ABDOMEN: Soft, Nontender, Nondistended; Bowel sounds present  EXTREMITIES:  No clubbing, cyanosis, or edema  PSYCH: AAOx3  NEUROLOGY: non-focal  SKIN: No rashes or lesions    LABS:                        13.3   10.36 )-----------( 133      ( 14 Sep 2017 03:40 )             42.2     WBC Trend: 10.36<--, 15.15<--, 23.93<--  09-14    143  |  102  |  28<H>  ----------------------------<  83  4.2   |  31  |  1.10    Ca    9.2      14 Sep 2017 03:40  Phos  3.3     09-14  Mg     2.2     09-14    TPro  7.5  /  Alb  3.5  /  TBili  0.4  /  DBili  x   /  AST  17  /  ALT  9   /  AlkPhos  51  09-13    Creatinine Trend: 1.10<--, 1.16<--, 1.22<--, 1.06<--, 1.06<--, 1.21<--              RADIOLOGY & ADDITIONAL TESTS:    Imaging Personally Reviewed: < from: MRI Head w/wo Cont (09.13.17 @ 16:16) >  Sagittal T1, axial T1, T2, FLAIR, SWI, and diffusion-weighted series were   obtained before contrast. After intravenous gadolinium contrast   administration, sagittal, coronal, and axial T1 postcontrast series were   obtained.    The study is markedly limited by motion.    On the diffusion-weighted series and ADC maps, acute infarction involves   the right greater than left frontal, parietal, and occipital lobes,  without hemorrhagic transformation. Infarct appear in a watershed   distribution.    There is no gross evidence for large enhancing mass, acute hemorrhage, or   hydrocephalus within the limitations of this study.    The punctate foci of intracranial air on the prior head CT have grossly   resolved over the time interval.    I discussed the exam findings with Dr. Simons at 4:35 PM on 09/13/2017 with   read back.    IMPRESSION:    Areas of acute infarction involve the right greater than left frontal,   parietal, and occipital lobes, without hemorrhagic transformation.    < end of copied text > Patient is a 47y old  Male who presents with a chief complaint of "My leg wouldn't stop shaking." (11 Sep 2017 07:22)      SUBJECTIVE / OVERNIGHT EVENTS:  Patient seen and examined at bedside. No acute events overnight. Patient denies any SOB, chest pain, abdominal pain or other issues (by nodding no).     MEDICATIONS  (STANDING):  atorvastatin 80 milliGRAM(s) Oral at bedtime  enoxaparin Injectable 40 milliGRAM(s) SubCutaneous every 24 hours  valproate sodium IVPB 500 milliGRAM(s) IV Intermittent every 8 hours  predniSONE   Tablet 40 milliGRAM(s) Oral daily  piperacillin/tazobactam IVPB. 3.375 Gram(s) IV Intermittent every 12 hours  chlorhexidine 4% Liquid 1 Application(s) Topical daily  buDESOnide 160 MICROgram(s)/formoterol 4.5 MICROgram(s) Inhaler 2 Puff(s) Inhalation two times a day  tiotropium 18 MICROgram(s) Capsule 1 Capsule(s) Inhalation daily  aspirin  chewable 81 milliGRAM(s) Oral daily    MEDICATIONS  (PRN):  acetaminophen    Suspension 650 milliGRAM(s) Oral every 6 hours PRN For Temp greater than 38 C (100.4 F)  ALBUTerol/ipratropium for Nebulization 3 milliLiter(s) Nebulizer every 6 hours PRN Shortness of Breath and/or Wheezing        CAPILLARY BLOOD GLUCOSE        I&O's Summary    13 Sep 2017 07:01  -  14 Sep 2017 07:00  --------------------------------------------------------  IN: 255 mL / OUT: 995 mL / NET: -740 mL      ICU Vital Signs Last 24 Hrs  T(C): 36.4 (14 Sep 2017 04:00), Max: 37.2 (13 Sep 2017 16:20)  T(F): 97.5 (14 Sep 2017 04:00), Max: 98.9 (13 Sep 2017 16:20)  HR: 100 (14 Sep 2017 06:00) (74 - 110)  BP: 115/95 (14 Sep 2017 06:00) (82/61 - 126/84)  BP(mean): 100 (14 Sep 2017 06:00) (64 - 100)  ABP: --  ABP(mean): --  RR: 12 (14 Sep 2017 06:00) (12 - 27)  SpO2: 93% (14 Sep 2017 06:00) (93% - 99%)    PHYSICAL EXAM:  GENERAL: NAD, well-developed  HEAD:  Atraumatic, Normocephalic  EYES: EOMI,  conjunctiva and sclera clear  NECK: Supple  CHEST/LUNG: Clear to auscultation bilaterally; No wheeze  HEART: Regular rate and rhythm; No murmurs, rubs, or gallops  ABDOMEN: Soft, Nontender, Nondistended; Bowel sounds present  EXTREMITIES:  No clubbing, cyanosis, or edema  NEUROLOGY: patient with weakness in LUE or LLE, RUE and RLE 5/5 strength.   SKIN: No rashes or lesions    LABS:                        13.3   10.36 )-----------( 133      ( 14 Sep 2017 03:40 )             42.2     WBC Trend: 10.36<--, 15.15<--, 23.93<--  09-14    143  |  102  |  28<H>  ----------------------------<  83  4.2   |  31  |  1.10    Ca    9.2      14 Sep 2017 03:40  Phos  3.3     09-14  Mg     2.2     09-14    TPro  7.5  /  Alb  3.5  /  TBili  0.4  /  DBili  x   /  AST  17  /  ALT  9   /  AlkPhos  51  09-13    Creatinine Trend: 1.10<--, 1.16<--, 1.22<--, 1.06<--, 1.06<--, 1.21<--              RADIOLOGY & ADDITIONAL TESTS:    Imaging Personally Reviewed: < from: MRI Head w/wo Cont (09.13.17 @ 16:16) >  Sagittal T1, axial T1, T2, FLAIR, SWI, and diffusion-weighted series were   obtained before contrast. After intravenous gadolinium contrast   administration, sagittal, coronal, and axial T1 postcontrast series were   obtained.    The study is markedly limited by motion.    On the diffusion-weighted series and ADC maps, acute infarction involves   the right greater than left frontal, parietal, and occipital lobes,  without hemorrhagic transformation. Infarct appear in a watershed   distribution.    There is no gross evidence for large enhancing mass, acute hemorrhage, or   hydrocephalus within the limitations of this study.    The punctate foci of intracranial air on the prior head CT have grossly   resolved over the time interval.    I discussed the exam findings with Dr. Simons at 4:35 PM on 09/13/2017 with   read back.    IMPRESSION:    Areas of acute infarction involve the right greater than left frontal,   parietal, and occipital lobes, without hemorrhagic transformation.    < end of copied text >    Bedside echo: Bubble study + for PFO

## 2017-09-14 NOTE — DISCHARGE NOTE ADULT - PROVIDER TOKENS
ADI:'[id number]' ADI:'[id number]',ADI:'[id number]' TOKEN:'[id number]',[id number]:'[id number]'

## 2017-09-14 NOTE — PROGRESS NOTE ADULT - ASSESSMENT
47 year old -American male initially presenting with LLE spasm. Hospital course complicated by an episode of unresponsiveness and gaze deviation concerning for seizure vs stroke. He was subsequently intubated for airway protection and extubated. Post extubation, mental status improved, though exam notable for L-sided weakness. EEG showed burst suppression pattern.    MRI brain showed R MCA and PCA infarcts. MRA head/neck is grossly unremarkable although limited by motion.  TTE showed grossly reduced left ventricular systolic function. 47 year old -American male initially presenting with LLE spasm. Hospital course complicated by an episode of unresponsiveness and gaze deviation concerning for seizure vs stroke. He was subsequently intubated for airway protection and extubated. Post extubation, mental status improved, though exam notable for L-sided weakness. EEG showed burst suppression pattern.    MRI brain showed bilateral MCA and PCA infarcts (R>L) in the frontoparietal and occipital areas which could be cardioembolic in source.  MRA head/neck is grossly unremarkable although limited by motion.  TTE done at bedside by ICU team showed positive bubble study with normal EF. 47 year old -American male initially presenting with LLE spasm. Hospital course complicated by an episode of unresponsiveness and gaze deviation concerning for seizure vs stroke. He was subsequently intubated for airway protection and extubated. Post extubation, mental status improved, though exam notable for L-sided weakness. EEG showed burst suppression pattern while on sedation.    MRI brain showed bilateral MCA and right PCA infarcts (R>L) in the frontoparietal and occipital areas which could be cardioembolic in source.  MRA head/neck is grossly unremarkable although limited by motion.  TTE done at bedside by ICU team showed positive bubble study with normal EF.

## 2017-09-14 NOTE — DISCHARGE NOTE ADULT - PATIENT PORTAL LINK FT
“You can access the [employer] Patient Portal, offered by [facility], by registering with the following website: [email or web address]”

## 2017-09-14 NOTE — OCCUPATIONAL THERAPY INITIAL EVALUATION ADULT - RANGE OF MOTION EXAMINATION, UPPER EXTREMITY
Left UE Passive ROM was WFL  (within functional limits)/Right UE Active ROM was WFL (within functional limits)

## 2017-09-14 NOTE — CHART NOTE - NSCHARTNOTEFT_GEN_A_CORE
47M with a PMH of sarcoidosis, HTN, COPD, and asthma complicated by pneumothoraces x3 who presented to the ED with severe leg spasms on 9/10. Began suddenly two days prior in the patient's home and have become more regular/erratic and worsening in contracture lasting seconds to minutes associated with intense tingling and pain sensation on left side of body and HA throughout head. Neurology was consulted and recommended CT head to r/o bleed. CTA of head and neck were done and normal.     On admission he was also noted to have abnormal EKG with TWI in V1,V2, biphasic T waves in V3-V5 and cardiology was consulted. CE were negative, asa and plavix load per cardiology with EKG and CE monitoring. He tested positive for entero/rhinovirus and had wheezing on exam. He was started on duonebs, steroids, azithromycin. Pulmonary was consulted for complicated lung history with current wheezing. Recommended CT chest for history of sarcoid which showed typical sarcoid changes with bilateral centrilobular nodules, mediastinal and hilar KORI.    Overnight on 9/11 patient became unresponsive with last known normal at 4AM. Stroke code was called at 4:10AM for facial droop. On exam, patient had incontinence and tongue trauma and was given ativan 2mg x2 for possible seizure however did not improve. He continued to be obtunded with labored breathing and was intubated for airway protection. CT head showed 2 foci in the R frontal cortex concerning for air vs. fat emboli. MRI head was done which showed areas of acute infarction involving the right greater than left frontal, parietal, and occipital lobes, without hemorrhagic transformation. Bedside US was done on 9/14 which showed positive bubble study with flow across the intraatrial septum. Official TTE is ordered.  Patient was extubated on 9/13 saturating well on 3L NC.       Neuro   -EEG w/ no seizures recorded. Neurology following, f/u ESR, CRP, and JONATAN, and long term Holter monitoring to assess atrial fibrillation.   - Due to association with sarcoid and vasculitis, will also check initial workup for vasculitis.   - c/w depacon. Bedside echo + bubble study, f/u official TTE  - c/w asa and atorvastatin    CVS - Pt w/ dynamic EKG and ASA/plavix loaded initially. CE negative x3. Unclear etiology for EKG changes although doubt ACS given no cardiac enzyme elevation. Cardiology with low suspicion for ACS, may consider further ischemic evaluation after stabilization.     Pulm - Patient extubated yesterday. saturating well on 3L NC, pulm following, c/w inhalers and steroids    GI - Tolerating regular diet    Renal - no active issues    Hem/onc - no active issues.    ID - Leukocytosis down trending, likely stress induced from seizures. Febrile yesterday. Was on zosyn and azithro (two days of coverage) for empiric coverage for aspiration PNA and atypicals. Day 3 of zosyn, can complete a 5 day course. Blood culture from 9/12 NGTD.     DVT ppx - Lovenox

## 2017-09-14 NOTE — DISCHARGE NOTE ADULT - MEDICATION SUMMARY - MEDICATIONS TO CHANGE
I will SWITCH the dose or number of times a day I take the medications listed below when I get home from the hospital:    predniSONE 50 mg oral tablet  -- 1 tab(s) by mouth once a day   -- It is very important that you take or use this exactly as directed.  Do not skip doses or discontinue unless directed by your doctor.  Obtain medical advice before taking any non-prescription drugs as some may affect the action of this medication.  Take with food or milk.    albuterol 1.25 mg/3 mL (0.042%) inhalation solution  -- 3 milliliter(s) inhaled 3 times a day

## 2017-09-14 NOTE — DISCHARGE NOTE ADULT - MEDICATION SUMMARY - MEDICATIONS TO STOP TAKING
I will STOP taking the medications listed below when I get home from the hospital:    Azithromycin 5 Day Dose Pack 250 mg oral tablet  -- 1 dose(s) by mouth once a day for respiratory infection  -- Do not take dairy products, antacids, or iron preparations within one hour of this medication.  Finish all this medication unless otherwise directed by prescriber.    losartan 25 mg oral tablet  -- 1 tab(s) by mouth 2 times a day    Incruse Ellipta  -- 1 puff(s) inhaled once a day

## 2017-09-14 NOTE — PROGRESS NOTE ADULT - PROBLEM SELECTOR PLAN 1
TTE with bubble study  Loop recorder  ASA 81 mg PO QD  Lipitor 80 mg PO QHS  HbA1c  Tele monitor  PT/OT eval  DVT prophylaxis with lovenox JONATAN  LE doppler to rule out DVT  ASA 81 mg PO QD  Lipitor 80 mg PO QHS  HbA1c  Tele monitor  PT/OT eval  DVT prophylaxis with lovenox

## 2017-09-14 NOTE — OCCUPATIONAL THERAPY INITIAL EVALUATION ADULT - PERTINENT HX OF CURRENT PROBLEM, REHAB EVAL
47 year old male with a PMHx of sarcoidosis, HTN, COPD, and asthma who presented to [facility] with severe leg spasms x 2-3 days. The patient also had a HA at the time of the spasm and tingling which was diffuse throughout the head and rated a 10/10 on a pain scale. During hospital course code stroke was called. Pt was intubated. Pt now s/p extubation and OT consult requested.

## 2017-09-14 NOTE — DISCHARGE NOTE ADULT - CARE PLAN
Principal Discharge DX:	Hemoptysis  Goal:	S/p Left thoracotomy, left upper lobectomy- continued wound healing  Instructions for follow-up, activity and diet:	Follow up with Dr. Caldera in 2 weekd  Chest x-ray prior to appointment with Dr. Caldera.  Please bring copy of chest x-ray film to appointment with Dr. Caldera   Pain management  Follow up with pulmonary  Take steroids  as directed Principal Discharge DX:	Hemoptysis  Goal:	S/p Left thoracotomy, left upper lobectomy- continued wound healing  Instructions for follow-up, activity and diet:	Follow up with Dr. Caldera in 2 weeks  Chest x-ray prior to appointment with Dr. Caldera.  Please bring copy of chest x-ray film to appointment with Dr. Caldera   Pain management  Follow up with pulmonary  Take steroids  as directed  Secondary Diagnosis:	PFO (patent foramen ovale)  Goal:	eventual closure of PFO  Instructions for follow-up, activity and diet:	FU with Cardiologist Dr. Dominguez about plans to fix PFO. Call for an apt. Principal Discharge DX:	Hemoptysis  Goal:	S/p Left thoracotomy, left upper lobectomy- continued wound healing  Instructions for follow-up, activity and diet:	Follow up with Dr. Caldera in 2-3 weeks Call for an apt.   Chest x-ray prior to appointment with Dr. Caldera.  Please bring copy of chest x-ray film to appointment with Dr. Caldera   Pain management  Follow up with pulmonary Dr. Riley in 2 weeks. Call for an apt.   Take steroids  as directed and inhalers  Walk frequently. You may climb stairs. Leave all your wounds uncovered and shower daily. Use incentive spirometer.  Secondary Diagnosis:	PFO (patent foramen ovale)  Goal:	eventual closure of PFO  Instructions for follow-up, activity and diet:	FU with Cardiologist Dr. Dominguez about plans to fix PFO. Call for an apt.  Secondary Diagnosis:	Aspergilloma  Goal:	Continue and complete antibiotics  Instructions for follow-up, activity and diet:	FU with Infectious disease in 1-2 weeks. Call for an apt.  Secondary Diagnosis:	Seizure  Secondary Diagnosis:	CVA (cerebral vascular accident) Principal Discharge DX:	Hemoptysis  Goal:	S/p Left thoracotomy, left upper lobectomy- continued wound healing  Instructions for follow-up, activity and diet:	Follow up with Dr. Caldera in 2-3 weeks Call for an apt.   Chest x-ray prior to appointment with Dr. Caldera.  Please bring copy of chest x-ray film to appointment with Dr. Caldera   Pain management  Follow up with pulmonary Dr. Riley in 2 weeks. Call for an apt.   Take steroids  as directed and inhalers  Walk frequently. You may climb stairs. Leave all your wounds uncovered and shower daily. Use incentive spirometer.  Secondary Diagnosis:	PFO (patent foramen ovale)  Goal:	eventual closure of PFO  Instructions for follow-up, activity and diet:	FU with Cardiologist Dr. Dominguez about plans to fix PFO. Call for an apt.  FU Cardiologist Dr. Torrez as well.  Secondary Diagnosis:	Aspergilloma  Goal:	Continue and complete antibiotics  Instructions for follow-up, activity and diet:	FU with Infectious disease Dr. Felipe in 1-2 weeks. Call for an apt.  Secondary Diagnosis:	Seizure  Instructions for follow-up, activity and diet:	FU with Neurologist  Secondary Diagnosis:	CVA (cerebral vascular accident)  Instructions for follow-up, activity and diet:	FU with Neurologist.

## 2017-09-14 NOTE — DISCHARGE NOTE ADULT - NS AS DC STROKE ED MATERIALS
Stroke Warning Signs and Symptoms/Risk Factors for Stroke/Need for Followup After Discharge/Stroke Education Booklet/Call 911 for Stroke/Prescribed Medications

## 2017-09-14 NOTE — PROGRESS NOTE ADULT - SUBJECTIVE AND OBJECTIVE BOX
Subjective: Interval History - No events overnight    Objective:   Vital Signs Last 24 Hrs  T(C): 36.2 (14 Sep 2017 07:30), Max: 37.2 (13 Sep 2017 16:20)  T(F): 97.2 (14 Sep 2017 07:30), Max: 98.9 (13 Sep 2017 16:20)  HR: 90 (14 Sep 2017 08:00) (74 - 100)  BP: 111/86 (14 Sep 2017 08:00) (93/79 - 126/84)  BP(mean): 92 (14 Sep 2017 08:00) (75 - 100)  RR: 17 (14 Sep 2017 08:00) (12 - 27)  SpO2: 95% (14 Sep 2017 08:00) (93% - 99%)    General Exam:   General appearance: No acute distress                   Neurological Exam:  Mental Status: Orientated to self, date and place.  Attention intact.  No dysarthria, aphasia.     Cranial Nerves: EOMI, VFF. No facial asymmetry. Tongue midline    Motor:   Drift:  LUE no RUE no  LLE no RLE no    Strength:   L deltoid 5/5 wrist extensor 5/5  R deltoid 5/5 wrist extensor 5/5  L iliopsoas 5/5 anterior tibialis 5/5  R iliopsoas 5/5 anterior tibialis 5/5    Tone: normal          Dysmetria: None in B/L finger to nose    Sensation: intact to light touch, no extinction    Other:  09-14    143  |  102  |  28<H>  ----------------------------<  83  4.2   |  31  |  1.10    Ca    9.2      14 Sep 2017 03:40  Phos  3.3     09-14  Mg     2.2     09-14    TPro  7.5  /  Alb  3.5  /  TBili  0.4  /  DBili  x   /  AST  17  /  ALT  9   /  AlkPhos  51  09-13    LIVER FUNCTIONS - ( 13 Sep 2017 05:50 )  Alb: 3.5 g/dL / Pro: 7.5 g/dL / ALK PHOS: 51 u/L / ALT: 9 u/L / AST: 17 u/L / GGT: x                      13.3   10.36 )-----------( 133      ( 14 Sep 2017 03:40 )             42.2     MEDICATIONS  (STANDING):  atorvastatin 80 milliGRAM(s) Oral at bedtime  enoxaparin Injectable 40 milliGRAM(s) SubCutaneous every 24 hours  valproate sodium IVPB 500 milliGRAM(s) IV Intermittent every 8 hours  predniSONE   Tablet 40 milliGRAM(s) Oral daily  piperacillin/tazobactam IVPB. 3.375 Gram(s) IV Intermittent every 12 hours  chlorhexidine 4% Liquid 1 Application(s) Topical daily  buDESOnide 160 MICROgram(s)/formoterol 4.5 MICROgram(s) Inhaler 2 Puff(s) Inhalation two times a day  tiotropium 18 MICROgram(s) Capsule 1 Capsule(s) Inhalation daily  aspirin  chewable 81 milliGRAM(s) Oral daily    MEDICATIONS  (PRN):  acetaminophen    Suspension 650 milliGRAM(s) Oral every 6 hours PRN For Temp greater than 38 C (100.4 F)  ALBUTerol/ipratropium for Nebulization 3 milliLiter(s) Nebulizer every 6 hours PRN Shortness of Breath and/or Wheezing Subjective: Interval History - No events overnight    Objective:   Vital Signs Last 24 Hrs  T(C): 36.2 (14 Sep 2017 07:30), Max: 37.2 (13 Sep 2017 16:20)  T(F): 97.2 (14 Sep 2017 07:30), Max: 98.9 (13 Sep 2017 16:20)  HR: 90 (14 Sep 2017 08:00) (74 - 100)  BP: 111/86 (14 Sep 2017 08:00) (93/79 - 126/84)  BP(mean): 92 (14 Sep 2017 08:00) (75 - 100)  RR: 17 (14 Sep 2017 08:00) (12 - 27)  SpO2: 95% (14 Sep 2017 08:00) (93% - 99%)    General Exam:   General appearance: No acute distress                   Neurological Exam:  Mental Status: Eyes closed and opens to verbal stimulus. Tends to keep eyes closed if not repeatedly stimulated. Orientated to self, date and place.  Conversational, No dysarthria, aphasia.     Cranial Nerves: Mild left gaze palsy, VFF. Mild left facial palsy.    Motor:   Drift:  LUE no movement RUE no  LLE slight movement RLE no    Strength:   L 0/5  L knee hip flexion at least 2/5, can move it from side to side slightly    Tone: normal    Sensation: intact to light touch, no extinction    Other:  09-14    143  |  102  |  28<H>  ----------------------------<  83  4.2   |  31  |  1.10    Ca    9.2      14 Sep 2017 03:40  Phos  3.3     09-14  Mg     2.2     09-14    TPro  7.5  /  Alb  3.5  /  TBili  0.4  /  DBili  x   /  AST  17  /  ALT  9   /  AlkPhos  51  09-13    LIVER FUNCTIONS - ( 13 Sep 2017 05:50 )  Alb: 3.5 g/dL / Pro: 7.5 g/dL / ALK PHOS: 51 u/L / ALT: 9 u/L / AST: 17 u/L / GGT: x                      13.3   10.36 )-----------( 133      ( 14 Sep 2017 03:40 )             42.2     MEDICATIONS  (STANDING):  atorvastatin 80 milliGRAM(s) Oral at bedtime  enoxaparin Injectable 40 milliGRAM(s) SubCutaneous every 24 hours  valproate sodium IVPB 500 milliGRAM(s) IV Intermittent every 8 hours  predniSONE   Tablet 40 milliGRAM(s) Oral daily  piperacillin/tazobactam IVPB. 3.375 Gram(s) IV Intermittent every 12 hours  chlorhexidine 4% Liquid 1 Application(s) Topical daily  buDESOnide 160 MICROgram(s)/formoterol 4.5 MICROgram(s) Inhaler 2 Puff(s) Inhalation two times a day  tiotropium 18 MICROgram(s) Capsule 1 Capsule(s) Inhalation daily  aspirin  chewable 81 milliGRAM(s) Oral daily    MEDICATIONS  (PRN):  acetaminophen    Suspension 650 milliGRAM(s) Oral every 6 hours PRN For Temp greater than 38 C (100.4 F)  ALBUTerol/ipratropium for Nebulization 3 milliLiter(s) Nebulizer every 6 hours PRN Shortness of Breath and/or Wheezing Subjective: Interval History - No events overnight    Objective:   Vital Signs Last 24 Hrs  T(C): 36.2 (14 Sep 2017 07:30), Max: 37.2 (13 Sep 2017 16:20)  T(F): 97.2 (14 Sep 2017 07:30), Max: 98.9 (13 Sep 2017 16:20)  HR: 90 (14 Sep 2017 08:00) (74 - 100)  BP: 111/86 (14 Sep 2017 08:00) (93/79 - 126/84)  BP(mean): 92 (14 Sep 2017 08:00) (75 - 100)  RR: 17 (14 Sep 2017 08:00) (12 - 27)  SpO2: 95% (14 Sep 2017 08:00) (93% - 99%)    General Exam:   General appearance: No acute distress                   Neurological Exam:  Mental Status: Eyes closed and opens to verbal stimulus. Tends to keep eyes closed if not repeatedly stimulated. Orientated to self, date and place.  Conversational, No dysarthria, aphasia. Recognizes own arm. Follows commands.    Cranial Nerves: Mild left gaze palsy, VFF. Mild left facial palsy.    Motor:   Drift:  LUE no movement, RUE no  LLE slight movement, RLE no    Strength:   L 0/5  L knee hip flexion at least 2/5, can move it from side to side slightly  RUE and RLE antigravity    Sensation: intact to light touch, no extinction    Other:  09-14    143  |  102  |  28<H>  ----------------------------<  83  4.2   |  31  |  1.10    Ca    9.2      14 Sep 2017 03:40  Phos  3.3     09-14  Mg     2.2     09-14    TPro  7.5  /  Alb  3.5  /  TBili  0.4  /  DBili  x   /  AST  17  /  ALT  9   /  AlkPhos  51  09-13    LIVER FUNCTIONS - ( 13 Sep 2017 05:50 )  Alb: 3.5 g/dL / Pro: 7.5 g/dL / ALK PHOS: 51 u/L / ALT: 9 u/L / AST: 17 u/L / GGT: x                      13.3   10.36 )-----------( 133      ( 14 Sep 2017 03:40 )             42.2     MEDICATIONS  (STANDING):  atorvastatin 80 milliGRAM(s) Oral at bedtime  enoxaparin Injectable 40 milliGRAM(s) SubCutaneous every 24 hours  valproate sodium IVPB 500 milliGRAM(s) IV Intermittent every 8 hours  predniSONE   Tablet 40 milliGRAM(s) Oral daily  piperacillin/tazobactam IVPB. 3.375 Gram(s) IV Intermittent every 12 hours  chlorhexidine 4% Liquid 1 Application(s) Topical daily  buDESOnide 160 MICROgram(s)/formoterol 4.5 MICROgram(s) Inhaler 2 Puff(s) Inhalation two times a day  tiotropium 18 MICROgram(s) Capsule 1 Capsule(s) Inhalation daily  aspirin  chewable 81 milliGRAM(s) Oral daily    MEDICATIONS  (PRN):  acetaminophen    Suspension 650 milliGRAM(s) Oral every 6 hours PRN For Temp greater than 38 C (100.4 F)  ALBUTerol/ipratropium for Nebulization 3 milliLiter(s) Nebulizer every 6 hours PRN Shortness of Breath and/or Wheezing Subjective: Interval History - No events overnight    Objective:   Vital Signs Last 24 Hrs  T(C): 36.2 (14 Sep 2017 07:30), Max: 37.2 (13 Sep 2017 16:20)  T(F): 97.2 (14 Sep 2017 07:30), Max: 98.9 (13 Sep 2017 16:20)  HR: 90 (14 Sep 2017 08:00) (74 - 100)  BP: 111/86 (14 Sep 2017 08:00) (93/79 - 126/84)  BP(mean): 92 (14 Sep 2017 08:00) (75 - 100)  RR: 17 (14 Sep 2017 08:00) (12 - 27)  SpO2: 95% (14 Sep 2017 08:00) (93% - 99%)    General Exam:   General appearance: No acute distress                   Neurological Exam:  Mental Status: Eyes closed and opens to verbal stimulus. Tends to keep eyes closed if not repeatedly stimulated. Orientated to self, date and place.  Conversational, No dysarthria, aphasia. Recognizes own arm. No Anosognosia;  Follows commands.    Cranial Nerves: Mild left gaze palsy, VFF. Mild left facial palsy.    Motor:   Drift:  LUE no movement, RUE no  LLE slight movement, RLE no    Strength:   L arm  0/5  L knee hip flexion at least 2/5, can move it from side to side slightly  RUE and RLE antigravity    Sensation: intact to light touch, no extinction    Other:  09-14    143  |  102  |  28<H>  ----------------------------<  83  4.2   |  31  |  1.10    Ca    9.2      14 Sep 2017 03:40  Phos  3.3     09-14  Mg     2.2     09-14    TPro  7.5  /  Alb  3.5  /  TBili  0.4  /  DBili  x   /  AST  17  /  ALT  9   /  AlkPhos  51  09-13    LIVER FUNCTIONS - ( 13 Sep 2017 05:50 )  Alb: 3.5 g/dL / Pro: 7.5 g/dL / ALK PHOS: 51 u/L / ALT: 9 u/L / AST: 17 u/L / GGT: x                      13.3   10.36 )-----------( 133      ( 14 Sep 2017 03:40 )             42.2     MEDICATIONS  (STANDING):  atorvastatin 80 milliGRAM(s) Oral at bedtime  enoxaparin Injectable 40 milliGRAM(s) SubCutaneous every 24 hours  valproate sodium IVPB 500 milliGRAM(s) IV Intermittent every 8 hours  predniSONE   Tablet 40 milliGRAM(s) Oral daily  piperacillin/tazobactam IVPB. 3.375 Gram(s) IV Intermittent every 12 hours  chlorhexidine 4% Liquid 1 Application(s) Topical daily  buDESOnide 160 MICROgram(s)/formoterol 4.5 MICROgram(s) Inhaler 2 Puff(s) Inhalation two times a day  tiotropium 18 MICROgram(s) Capsule 1 Capsule(s) Inhalation daily  aspirin  chewable 81 milliGRAM(s) Oral daily    MEDICATIONS  (PRN):  acetaminophen    Suspension 650 milliGRAM(s) Oral every 6 hours PRN For Temp greater than 38 C (100.4 F)  ALBUTerol/ipratropium for Nebulization 3 milliLiter(s) Nebulizer every 6 hours PRN Shortness of Breath and/or Wheezing

## 2017-09-14 NOTE — DISCHARGE NOTE ADULT - COMMUNITY RESOURCES
[facility]  [address] [place] [address]  [phone]  [facility]  [phone]

## 2017-09-14 NOTE — DISCHARGE NOTE ADULT - PLAN OF CARE
S/p Left thoracotomy, left upper lobectomy- continued wound healing Follow up with Dr. Caldera in 2 weekd  Chest x-ray prior to appointment with Dr. Caldera.  Please bring copy of chest x-ray film to appointment with Dr. Caldera   Pain management  Follow up with pulmonary  Take steroids  as directed Follow up with Dr. Caldera in 2 weeks  Chest x-ray prior to appointment with Dr. Caldera.  Please bring copy of chest x-ray film to appointment with Dr. Caldera   Pain management  Follow up with pulmonary  Take steroids  as directed eventual closure of PFO FU with Cardiologist Dr. Dominguez about plans to fix PFO. Call for an apt. Follow up with Dr. Caldera in 2-3 weeks Call for an apt.   Chest x-ray prior to appointment with Dr. Caldera.  Please bring copy of chest x-ray film to appointment with Dr. Caldera   Pain management  Follow up with pulmonary Dr. Riley in 2 weeks. Call for an apt.   Take steroids  as directed and inhalers  Walk frequently. You may climb stairs. Leave all your wounds uncovered and shower daily. Use incentive spirometer. Continue and complete antibiotics FU with Infectious disease in 1-2 weeks. Call for an apt. FU with Cardiologist Dr. Dominguez about plans to fix PFO. Call for an apt.  FU Cardiologist Dr. Torrez as well. FU with Infectious disease Dr. Felipe in 1-2 weeks. Call for an apt. FU with Neurologist FU with Neurologist.

## 2017-09-15 DIAGNOSIS — I69.359 HEMIPLEGIA AND HEMIPARESIS FOLLOWING CEREBRAL INFARCTION AFFECTING UNSPECIFIED SIDE: ICD-10-CM

## 2017-09-15 DIAGNOSIS — Z29.9 ENCOUNTER FOR PROPHYLACTIC MEASURES, UNSPECIFIED: ICD-10-CM

## 2017-09-15 DIAGNOSIS — R50.9 FEVER, UNSPECIFIED: ICD-10-CM

## 2017-09-15 DIAGNOSIS — I50.20 UNSPECIFIED SYSTOLIC (CONGESTIVE) HEART FAILURE: ICD-10-CM

## 2017-09-15 LAB
BUN SERPL-MCNC: 22 MG/DL — SIGNIFICANT CHANGE UP (ref 7–23)
CALCIUM SERPL-MCNC: 9.6 MG/DL — SIGNIFICANT CHANGE UP (ref 8.4–10.5)
CHLORIDE SERPL-SCNC: 97 MMOL/L — LOW (ref 98–107)
CO2 SERPL-SCNC: 29 MMOL/L — SIGNIFICANT CHANGE UP (ref 22–31)
CREAT SERPL-MCNC: 1.04 MG/DL — SIGNIFICANT CHANGE UP (ref 0.5–1.3)
GLUCOSE SERPL-MCNC: 126 MG/DL — HIGH (ref 70–99)
HCT VFR BLD CALC: 44.2 % — SIGNIFICANT CHANGE UP (ref 39–50)
HGB BLD-MCNC: 14 G/DL — SIGNIFICANT CHANGE UP (ref 13–17)
MAGNESIUM SERPL-MCNC: 2 MG/DL — SIGNIFICANT CHANGE UP (ref 1.6–2.6)
MCHC RBC-ENTMCNC: 28.7 PG — SIGNIFICANT CHANGE UP (ref 27–34)
MCHC RBC-ENTMCNC: 31.7 % — LOW (ref 32–36)
MCV RBC AUTO: 90.8 FL — SIGNIFICANT CHANGE UP (ref 80–100)
NRBC # FLD: 0 — SIGNIFICANT CHANGE UP
PLATELET # BLD AUTO: 143 K/UL — LOW (ref 150–400)
PMV BLD: 12.2 FL — SIGNIFICANT CHANGE UP (ref 7–13)
POTASSIUM SERPL-MCNC: 4.2 MMOL/L — SIGNIFICANT CHANGE UP (ref 3.5–5.3)
POTASSIUM SERPL-SCNC: 4.2 MMOL/L — SIGNIFICANT CHANGE UP (ref 3.5–5.3)
RBC # BLD: 4.87 M/UL — SIGNIFICANT CHANGE UP (ref 4.2–5.8)
RBC # FLD: 12.4 % — SIGNIFICANT CHANGE UP (ref 10.3–14.5)
SODIUM SERPL-SCNC: 141 MMOL/L — SIGNIFICANT CHANGE UP (ref 135–145)
WBC # BLD: 7.68 K/UL — SIGNIFICANT CHANGE UP (ref 3.8–10.5)
WBC # FLD AUTO: 7.68 K/UL — SIGNIFICANT CHANGE UP (ref 3.8–10.5)

## 2017-09-15 PROCEDURE — 99233 SBSQ HOSP IP/OBS HIGH 50: CPT

## 2017-09-15 PROCEDURE — 99222 1ST HOSP IP/OBS MODERATE 55: CPT | Mod: GC

## 2017-09-15 RX ORDER — POLYETHYLENE GLYCOL 3350 17 G/17G
17 POWDER, FOR SOLUTION ORAL AT BEDTIME
Qty: 0 | Refills: 0 | Status: DISCONTINUED | OUTPATIENT
Start: 2017-09-15 | End: 2017-11-02

## 2017-09-15 RX ORDER — DIVALPROEX SODIUM 500 MG/1
500 TABLET, DELAYED RELEASE ORAL
Qty: 0 | Refills: 0 | Status: DISCONTINUED | OUTPATIENT
Start: 2017-09-15 | End: 2017-09-26

## 2017-09-15 RX ORDER — BUDESONIDE AND FORMOTEROL FUMARATE DIHYDRATE 160; 4.5 UG/1; UG/1
2 AEROSOL RESPIRATORY (INHALATION)
Qty: 0 | Refills: 0 | Status: DISCONTINUED | OUTPATIENT
Start: 2017-09-15 | End: 2017-09-25

## 2017-09-15 RX ADMIN — Medication 81 MILLIGRAM(S): at 11:31

## 2017-09-15 RX ADMIN — CHLORHEXIDINE GLUCONATE 1 APPLICATION(S): 213 SOLUTION TOPICAL at 11:31

## 2017-09-15 RX ADMIN — PIPERACILLIN AND TAZOBACTAM 25 GRAM(S): 4; .5 INJECTION, POWDER, LYOPHILIZED, FOR SOLUTION INTRAVENOUS at 14:49

## 2017-09-15 RX ADMIN — PIPERACILLIN AND TAZOBACTAM 25 GRAM(S): 4; .5 INJECTION, POWDER, LYOPHILIZED, FOR SOLUTION INTRAVENOUS at 22:19

## 2017-09-15 RX ADMIN — PIPERACILLIN AND TAZOBACTAM 25 GRAM(S): 4; .5 INJECTION, POWDER, LYOPHILIZED, FOR SOLUTION INTRAVENOUS at 07:26

## 2017-09-15 RX ADMIN — DIVALPROEX SODIUM 500 MILLIGRAM(S): 500 TABLET, DELAYED RELEASE ORAL at 17:00

## 2017-09-15 RX ADMIN — ENOXAPARIN SODIUM 40 MILLIGRAM(S): 100 INJECTION SUBCUTANEOUS at 11:31

## 2017-09-15 RX ADMIN — ATORVASTATIN CALCIUM 80 MILLIGRAM(S): 80 TABLET, FILM COATED ORAL at 22:19

## 2017-09-15 RX ADMIN — Medication 27.5 MILLIGRAM(S): at 07:26

## 2017-09-15 RX ADMIN — Medication 40 MILLIGRAM(S): at 07:26

## 2017-09-15 RX ADMIN — POLYETHYLENE GLYCOL 3350 17 GRAM(S): 17 POWDER, FOR SOLUTION ORAL at 17:52

## 2017-09-15 NOTE — PROGRESS NOTE ADULT - SUBJECTIVE AND OBJECTIVE BOX
Subjective: Interval History - No events overnight    Objective:   Vital Signs Last 24 Hrs  T(C): 37 (15 Sep 2017 07:24), Max: 37 (15 Sep 2017 07:24)  T(F): 98.6 (15 Sep 2017 07:24), Max: 98.6 (15 Sep 2017 07:24)  HR: 88 (15 Sep 2017 07:24) (74 - 100)  BP: 109/78 (15 Sep 2017 07:24) (102/81 - 131/97)  BP(mean): 91 (15 Sep 2017 02:00) (83 - 103)  RR: 18 (15 Sep 2017 07:24) (12 - 20)  SpO2: 99% (15 Sep 2017 07:24) (92% - 99%)    General Exam:   General appearance: No acute distress                   Neurological Exam:  Mental Status: Orientated to self, date and place.  Attention intact.  No dysarthria, aphasia.     Cranial Nerves: EOMI, VFF. No facial asymmetry. Tongue midline    Motor:   Drift:  LUE no RUE no  LLE no RLE no    Strength:   L deltoid 5/5 wrist extensor 5/5  R deltoid 5/5 wrist extensor 5/5  L iliopsoas 5/5 anterior tibialis 5/5  R iliopsoas 5/5 anterior tibialis 5/5    Tone: normal          Dysmetria: None in B/L finger to nose    Sensation: intact to light touch, no extinction    Other:  09-14    143  |  102  |  28<H>  ----------------------------<  83  4.2   |  31  |  1.10    Ca    9.2      14 Sep 2017 03:40  Phos  3.3     09-14  Mg     2.2     09-14                        13.3   10.36 )-----------( 133      ( 14 Sep 2017 03:40 )             42.2     MEDICATIONS  (STANDING):  atorvastatin 80 milliGRAM(s) Oral at bedtime  enoxaparin Injectable 40 milliGRAM(s) SubCutaneous every 24 hours  valproate sodium IVPB 500 milliGRAM(s) IV Intermittent every 8 hours  predniSONE   Tablet 40 milliGRAM(s) Oral daily  chlorhexidine 4% Liquid 1 Application(s) Topical daily  buDESOnide 160 MICROgram(s)/formoterol 4.5 MICROgram(s) Inhaler 2 Puff(s) Inhalation two times a day  tiotropium 18 MICROgram(s) Capsule 1 Capsule(s) Inhalation daily  aspirin  chewable 81 milliGRAM(s) Oral daily  piperacillin/tazobactam IVPB. 3.375 Gram(s) IV Intermittent every 8 hours    MEDICATIONS  (PRN):  acetaminophen    Suspension 650 milliGRAM(s) Oral every 6 hours PRN For Temp greater than 38 C (100.4 F)  ALBUTerol/ipratropium for Nebulization 3 milliLiter(s) Nebulizer every 6 hours PRN Shortness of Breath and/or Wheezing Subjective: Interval History - No events overnight  Reports feeling stronger on both sides.    Objective:   Vital Signs Last 24 Hrs  T(C): 37 (15 Sep 2017 07:24), Max: 37 (15 Sep 2017 07:24)  T(F): 98.6 (15 Sep 2017 07:24), Max: 98.6 (15 Sep 2017 07:24)  HR: 88 (15 Sep 2017 07:24) (74 - 100)  BP: 109/78 (15 Sep 2017 07:24) (102/81 - 131/97)  BP(mean): 91 (15 Sep 2017 02:00) (83 - 103)  RR: 18 (15 Sep 2017 07:24) (12 - 20)  SpO2: 99% (15 Sep 2017 07:24) (92% - 99%)    General Exam:   General appearance: No acute distress                   Neurological Exam:  Mental Status: Eyes open spontaneously, alert. Orientated to self, age, date and place.  Attention intact.  No dysarthria, aphasia.     Cranial Nerves: EOMI, VFF. No facial asymmetry. Tongue midline    Motor:   Drift:  LUE no movement RUE no  LLE antigravity RLE no    Strength:   LUE 1/5  R antigravity  L iliopsoas 3-/5 anterior tibialis 3/5 dorsiflexion 3/5 complete flexion of hip and knee better than yesterday  R iliopsoas 5/5 anterior tibialis 5/5    Tone: normal            Sensation: intact to light touch, no extinction    Other:  09-14    143  |  102  |  28<H>  ----------------------------<  83  4.2   |  31  |  1.10    Ca    9.2      14 Sep 2017 03:40  Phos  3.3     09-14  Mg     2.2     09-14                        13.3   10.36 )-----------( 133      ( 14 Sep 2017 03:40 )             42.2     MEDICATIONS  (STANDING):  atorvastatin 80 milliGRAM(s) Oral at bedtime  enoxaparin Injectable 40 milliGRAM(s) SubCutaneous every 24 hours  valproate sodium IVPB 500 milliGRAM(s) IV Intermittent every 8 hours  predniSONE   Tablet 40 milliGRAM(s) Oral daily  chlorhexidine 4% Liquid 1 Application(s) Topical daily  buDESOnide 160 MICROgram(s)/formoterol 4.5 MICROgram(s) Inhaler 2 Puff(s) Inhalation two times a day  tiotropium 18 MICROgram(s) Capsule 1 Capsule(s) Inhalation daily  aspirin  chewable 81 milliGRAM(s) Oral daily  piperacillin/tazobactam IVPB. 3.375 Gram(s) IV Intermittent every 8 hours    MEDICATIONS  (PRN):  acetaminophen    Suspension 650 milliGRAM(s) Oral every 6 hours PRN For Temp greater than 38 C (100.4 F)  ALBUTerol/ipratropium for Nebulization 3 milliLiter(s) Nebulizer every 6 hours PRN Shortness of Breath and/or Wheezing Subjective: Interval History - No events overnight  Reports feeling stronger on both sides.    Objective:   Vital Signs Last 24 Hrs  T(C): 37 (15 Sep 2017 07:24), Max: 37 (15 Sep 2017 07:24)  T(F): 98.6 (15 Sep 2017 07:24), Max: 98.6 (15 Sep 2017 07:24)  HR: 88 (15 Sep 2017 07:24) (74 - 100)  BP: 109/78 (15 Sep 2017 07:24) (102/81 - 131/97)  BP(mean): 91 (15 Sep 2017 02:00) (83 - 103)  RR: 18 (15 Sep 2017 07:24) (12 - 20)  SpO2: 99% (15 Sep 2017 07:24) (92% - 99%)    General Exam:   General appearance: No acute distress                   Neurological Exam:  Mental Status: Eyes open spontaneously, alert. Orientated to self, age, date and place.  Attention intact.  No dysarthria, aphasia.     Cranial Nerves: EOMI, VFF. No facial asymmetry. Tongue midline    Motor:   Drift:  LUE no movement RUE no  LLE antigravity RLE no    Strength:   LUE 1/5  R antigravity  L iliopsoas 3-/5 anterior tibialis 3/5; complete flexion of hip and knee better than yesterday  R iliopsoas 5/5 anterior tibialis 5/5    Tone: normal            Sensation: intact to light touch, no extinction    Other:  09-14    143  |  102  |  28<H>  ----------------------------<  83  4.2   |  31  |  1.10    Ca    9.2      14 Sep 2017 03:40  Phos  3.3     09-14  Mg     2.2     09-14                        13.3   10.36 )-----------( 133      ( 14 Sep 2017 03:40 )             42.2     MEDICATIONS  (STANDING):  atorvastatin 80 milliGRAM(s) Oral at bedtime  enoxaparin Injectable 40 milliGRAM(s) SubCutaneous every 24 hours  valproate sodium IVPB 500 milliGRAM(s) IV Intermittent every 8 hours  predniSONE   Tablet 40 milliGRAM(s) Oral daily  chlorhexidine 4% Liquid 1 Application(s) Topical daily  buDESOnide 160 MICROgram(s)/formoterol 4.5 MICROgram(s) Inhaler 2 Puff(s) Inhalation two times a day  tiotropium 18 MICROgram(s) Capsule 1 Capsule(s) Inhalation daily  aspirin  chewable 81 milliGRAM(s) Oral daily  piperacillin/tazobactam IVPB. 3.375 Gram(s) IV Intermittent every 8 hours    MEDICATIONS  (PRN):  acetaminophen    Suspension 650 milliGRAM(s) Oral every 6 hours PRN For Temp greater than 38 C (100.4 F)  ALBUTerol/ipratropium for Nebulization 3 milliLiter(s) Nebulizer every 6 hours PRN Shortness of Breath and/or Wheezing

## 2017-09-15 NOTE — PROGRESS NOTE ADULT - PROBLEM SELECTOR PLAN 2
Patient has coarse rhonchi: Has been on steroids: he does have hx of asthma too: Continue bronchodilators and steroids; may be in few days if his wheezing improves: will dc steroids

## 2017-09-15 NOTE — PROGRESS NOTE ADULT - ASSESSMENT
47 year old -American male initially presenting with LLE spasm. Hospital course complicated by an episode of unresponsiveness and gaze deviation concerning for seizure vs stroke. He was subsequently intubated for airway protection and extubated. Post extubation, mental status improved, though exam notable for L-sided weakness. EEG showed burst suppression pattern while on sedation.    MRI brain showed bilateral MCA and right PCA infarcts (R>L) in the frontoparietal and occipital areas which could be cardioembolic in source.  MRA head/neck is grossly unremarkable although limited by motion.  TTE done at bedside by ICU team showed positive bubble study with normal EF.  LE doppler negative for DVT bilaterally 47 year old -American male initially presenting with LLE spasm. Hospital course complicated by an episode of unresponsiveness and gaze deviation concerning for seizure vs stroke. He was subsequently intubated for airway protection and extubated. Post extubation, mental status improved, though exam notable for L-sided weakness. EEG showed burst suppression pattern while on sedation.    MRI brain showed bilateral (R>L) MCA and right PCA infarcts in the frontoparietal and occipital areas which could be cardioembolic in source.  MRA head/neck is grossly unremarkable although limited by motion.  TTE done at bedside by ICU team showed positive bubble study with normal EF.  LE doppler negative for DVT bilaterally

## 2017-09-15 NOTE — PROGRESS NOTE ADULT - PROBLEM SELECTOR PLAN 2
fever on 9/12 with leukocytosis, unclear etiology   on empiric abx zosyn for a 5 day course   leukocytosis improved   no sign of active infection at this time, blood cx NTD

## 2017-09-15 NOTE — PROGRESS NOTE ADULT - SUBJECTIVE AND OBJECTIVE BOX
Patient is a 47y old  Male who presents with a chief complaint of "My leg wouldn't stop shaking." (14 Sep 2017 09:59)      SUBJECTIVE / OVERNIGHT EVENTS:  Pt reports feeling well, can lift left leg off the bed.     MEDICATIONS  (STANDING):  atorvastatin 80 milliGRAM(s) Oral at bedtime  enoxaparin Injectable 40 milliGRAM(s) SubCutaneous every 24 hours  valproate sodium IVPB 500 milliGRAM(s) IV Intermittent every 8 hours  predniSONE   Tablet 40 milliGRAM(s) Oral daily  chlorhexidine 4% Liquid 1 Application(s) Topical daily  buDESOnide 160 MICROgram(s)/formoterol 4.5 MICROgram(s) Inhaler 2 Puff(s) Inhalation two times a day  tiotropium 18 MICROgram(s) Capsule 1 Capsule(s) Inhalation daily  aspirin  chewable 81 milliGRAM(s) Oral daily  piperacillin/tazobactam IVPB. 3.375 Gram(s) IV Intermittent every 8 hours    MEDICATIONS  (PRN):  acetaminophen    Suspension 650 milliGRAM(s) Oral every 6 hours PRN For Temp greater than 38 C (100.4 F)  ALBUTerol/ipratropium for Nebulization 3 milliLiter(s) Nebulizer every 6 hours PRN Shortness of Breath and/or Wheezing      T(C): 37 (09-15-17 @ 07:24), Max: 37 (09-15-17 @ 07:24)  HR: 88 (09-15-17 @ 07:24) (74 - 95)  BP: 109/78 (09-15-17 @ 07:24) (102/81 - 131/97)  RR: 18 (09-15-17 @ 07:24) (12 - 20)  SpO2: 99% (09-15-17 @ 07:24) (93% - 99%)  CAPILLARY BLOOD GLUCOSE        I&O's Summary    14 Sep 2017 07:01  -  15 Sep 2017 07:00  --------------------------------------------------------  IN: 900 mL / OUT: 720 mL / NET: 180 mL        PHYSICAL EXAM:  GENERAL: NAD, well-developed  HEAD:  Atraumatic, Normocephalic  NECK: Supple, No JVD  CHEST/LUNG: Clear to auscultation bilaterally; No wheeze  HEART: s1 s2, regular rhythm and rate   ABDOMEN: Soft, Nontender, Nondistended; Bowel sounds present  EXTREMITIES:  2+ Peripheral Pulses, No clubbing, cyanosis, or edema  PSYCH: AAOx3, calm   NEUROLOGY: strength LUE 0/5, LLE 3/5  SKIN: No rashes or lesions    LABS:                        13.3   10.36 )-----------( 133      ( 14 Sep 2017 03:40 )             42.2     09-14    143  |  102  |  28<H>  ----------------------------<  83  4.2   |  31  |  1.10    Ca    9.2      14 Sep 2017 03:40  Phos  3.3     09-14  Mg     2.2     09-14                RADIOLOGY & ADDITIONAL TESTS:    Imaging Personally Reviewed:    Consultant(s) Notes Reviewed:      Care Discussed with Consultants/Other Providers:

## 2017-09-15 NOTE — PROGRESS NOTE ADULT - SUBJECTIVE AND OBJECTIVE BOX
Patient is a 47y old  Male who presents with a chief complaint of "My leg wouldn't stop shaking." (14 Sep 2017 09:59)  alert and awake:  not in respiratory distress  tells me he was not on any treatment for sarcoidosis     Any change in ROS:     MEDICATIONS  (STANDING):  atorvastatin 80 milliGRAM(s) Oral at bedtime  enoxaparin Injectable 40 milliGRAM(s) SubCutaneous every 24 hours  valproate sodium IVPB 500 milliGRAM(s) IV Intermittent every 8 hours  predniSONE   Tablet 40 milliGRAM(s) Oral daily  chlorhexidine 4% Liquid 1 Application(s) Topical daily  buDESOnide 160 MICROgram(s)/formoterol 4.5 MICROgram(s) Inhaler 2 Puff(s) Inhalation two times a day  tiotropium 18 MICROgram(s) Capsule 1 Capsule(s) Inhalation daily  aspirin  chewable 81 milliGRAM(s) Oral daily  piperacillin/tazobactam IVPB. 3.375 Gram(s) IV Intermittent every 8 hours    MEDICATIONS  (PRN):  acetaminophen    Suspension 650 milliGRAM(s) Oral every 6 hours PRN For Temp greater than 38 C (100.4 F)  ALBUTerol/ipratropium for Nebulization 3 milliLiter(s) Nebulizer every 6 hours PRN Shortness of Breath and/or Wheezing    Vital Signs Last 24 Hrs  T(C): 37 (15 Sep 2017 07:24), Max: 37 (15 Sep 2017 07:24)  T(F): 98.6 (15 Sep 2017 07:24), Max: 98.6 (15 Sep 2017 07:24)  HR: 88 (15 Sep 2017 07:24) (74 - 95)  BP: 109/78 (15 Sep 2017 07:24) (102/81 - 131/85)  BP(mean): 91 (15 Sep 2017 02:00) (83 - 101)  RR: 18 (15 Sep 2017 07:24) (12 - 20)  SpO2: 99% (15 Sep 2017 07:24) (93% - 99%)    I&O's Summary    14 Sep 2017 07:01  -  15 Sep 2017 07:00  --------------------------------------------------------  IN: 900 mL / OUT: 720 mL / NET: 180 mL          Physical Exam:   GENERAL: NAD, well-groomed, well-developed  HEENT: GIOVANI/   Atraumatic, Normocephalic  ENMT: No tonsillar erythema, exudates, or enlargement; Moist mucous membranes, Good dentition, No lesions  NECK: Supple, No JVD, Normal thyroid  CHEST/LUNG: wheezing ++  CVS: Regular rate and rhythm; No murmurs, rubs, or gallops  GI: : Soft, Nontender, Nondistended; Bowel sounds present  NERVOUS SYSTEM:  Alert & Oriented X3  EXTREMITIES:  2+ Peripheral Pulses, No clubbing, cyanosis, or edema  LYMPH: No lymphadenopathy noted  SKIN: No rashes or lesions  ENDOCRINOLOGY: No Thyromegaly  PSYCH: Appropriate    Labs:  25                            13.3   10.36 )-----------( 133      ( 14 Sep 2017 03:40 )             42.2                         13.1   15.15 )-----------( 161      ( 13 Sep 2017 05:50 )             41.8                         13.7   23.93 )-----------( 176      ( 12 Sep 2017 04:15 )             42.2     09-14    143  |  102  |  28<H>  ----------------------------<  83  4.2   |  31  |  1.10  09-13    143  |  102  |  24<H>  ----------------------------<  87  4.5   |  30  |  1.16  09-12    139  |  99  |  21  ----------------------------<  181<H>  4.4   |  22  |  1.22    Ca    9.2      14 Sep 2017 03:40  Phos  3.3     09-14  Mg     2.2     09-14    TPro  7.5  /  Alb  3.5  /  TBili  0.4  /  DBili  x   /  AST  17  /  ALT  9   /  AlkPhos  51  09-13    CAPILLARY BLOOD GLUCOSE                Lactate, Blood: 2.0 mmol/L (09-13 @ 05:50)  Cultures:       < from: MRI Head w/wo Cont (09.13.17 @ 16:16) >  y is markedly limited by motion.    On the diffusion-weighted series and ADC maps, acute infarction involves   the right greater than left frontal, parietal, and occipital lobes,  without hemorrhagic transformation. Infarct appear in a watershed   distribution.    There is no gross evidence for large enhancing mass, acute hemorrhage, or   hydrocephalus within the limitations of this study.    The punctate foci of intracranial air on the prior head CT have grossly   resolved over the time interval.    I discussed the exam findings with Dr. Simons at 4:35 PM on 09/13/2017 with   read back.    IMPRESSION:    Areas of acute infarction involve the right greater than left frontal,   parietal, and occipital lobes, without hemorrhagic transformation.                  HECTOR WU M.D., ATTENDING RADIOLOGIST  This document has been electronically signed. Sep 13 2017  4:35PM    < end of copied text >                  Rapid Respiratory Viral Panel Result        09-11 @ 02:00  Rapid RVP --  Coronovirus --  Adenovirus --  Bordetella Pertussis --  Chlamydia Pneumonia --  Entero/RhinovirusPOSITIVE  HKU1 Coronovirus --  HMPV Coronovirus --  Influenza A --  Influenza AH1 --  Influenza AH1 2009 --  Influenza AH3 --  Influenza B --  Mycoplasma Pneumoniae --  NL63 Coronovirus --  OC43 Coronovirus --  Parainfluenza 1 --  Parainfluenza 2 --  Parainfluenza 3 --  Parainfluenza 4 --  Resp Syncytial Virus --          Studies  Chest X-RAY  CT SCAN Chest   Venous Dopplers: LE:   CT Abdomen  Others      < from: TTE with Doppler (w/Cont) (09.12.17 @ 07:34) >  contrast (Definity) LV systolic function assessment is  limited. Grossly reduced left ventricular systolic  function.  Right Heart: Normal right atrium. The right ventricle is  not well visualized appears enlarged with decreased  function. Normal tricuspid valve.  Pericardium/PleuraNormal pericardium with no pericardial  effusion.  ------------------------------------------------------------------------  CONCLUSIONS:  1. Despite  intravenous injection of echo contrast  (Definity) LV systolic function assessment is limited.  Grossly reduced left ventricular systolic function.  2. The right ventricle is not well visualized appears  enlarged with decreased function.  3. Incomplete tricuspid regurgitant doppler envelope  precludes estimation of RVSP.  ------------------------------------------------------------------------  Confirmed on  9/12/2017 - 18:17:16 by SANTOSH Mauricio    < end of copied text >    < from: CT Chest No Cont (09.11.17 @ 20:24) >  ght measuring about 4 mm.    Evaluation of the lungs demonstrate patchy and nodular opacities   scattered throughout the mid to upper lungs compatible with the patient's   given history of sarcoidosis. There are upper lobe  more centrally   located patchy opacities with associated retraction of the   bronchovascular bundles with associated volume loss of the upper lobes   and associated extensive architectural distortion. There are superimposed   bilateral mid to upper lung cysts again likely related to the patient's   given history of sarcoidosis. There are no central endobronchial lesions.    Evaluation of the bones demonstrate no significant abnormality.    IMPRESSION: Pulmonary nodular or patchy opacities throughout both lungs   with associated volume loss and architectural distortion as described   above related to sarcoidosis.    Enlargement of the main pulmonary artery can be seen in the setting of   pulmonary arterial hypertension.                  MELANY GREEN M.D. ATTENDING RADIOLOGIST  This document has been electronically signed. Sep 12 2017  9:07AM    < end of copied text >

## 2017-09-15 NOTE — PHYSICAL THERAPY INITIAL EVALUATION ADULT - PERTINENT HX OF CURRENT PROBLEM, REHAB EVAL
Pt is a 47 year old male who presented to ED with severe leg spasms and headache leading to a presyncopal episode

## 2017-09-15 NOTE — PROGRESS NOTE ADULT - PROBLEM SELECTOR PLAN 4
stable  on prednisone 40mg qd  follow up pulm rec TTE showed reduced LV function.   follow up cardiology rec

## 2017-09-15 NOTE — CHART NOTE - NSCHARTNOTEFT_GEN_A_CORE
47M with a PMH of sarcoidosis, HTN, COPD, and asthma complicated by pneumothoraces x3 who presented to the ED with severe leg spasms on 9/10. Began suddenly two days prior in the patient's home and have become more regular/erratic and worsening in contracture lasting seconds to minutes associated with intense tingling and pain sensation on left side of body and HA throughout head. Neurology was consulted and recommended CT head to r/o bleed. CTA of head and neck were done and normal.     On admission he was also noted to have abnormal EKG with TWI in V1,V2, biphasic T waves in V3-V5 and cardiology was consulted. CE were negative, asa and plavix load per cardiology with EKG and CE monitoring. He tested positive for entero/rhinovirus and had wheezing on exam. He was started on duonebs, steroids, azithromycin. Pulmonary was consulted for complicated lung history with current wheezing. Recommended CT chest for history of sarcoid which showed typical sarcoid changes with bilateral centrilobular nodules, mediastinal and hilar KORI.    Overnight on 9/11 patient became unresponsive with last known normal at 4AM. Stroke code was called at 4:10AM for facial droop. On exam, patient had incontinence and tongue trauma and was given ativan 2mg x2 for possible seizure however did not improve. He continued to be obtunded with labored breathing and was intubated for airway protection. CT head showed 2 foci in the R frontal cortex concerning for air vs. fat emboli. MRI head was done which showed areas of acute infarction involving the right greater than left frontal, parietal, and occipital lobes, without hemorrhagic transformation. Bedside US was done on 9/14 which showed positive bubble study with flow across the intraatrial septum. Official TTE is ordered.  Patient was extubated on 9/13 saturating well on 3L NC.     Plan of care  -f/u ESR, CRP, and JONATAN, and long term Holter monitoring to assess atrial fibrillation.   -Due to association with sarcoid and vasculitis, check initial workup for vasculitis.   -c/w depacon  -c/w asa and atorvastatin  -cardiology- low suspicion for ACS, unclear etiology of EKG change. further work up outpt  -complete 5 day course of vanc, zosyn for empiric aspiration PNA   - Lovenox for DVT ppx

## 2017-09-15 NOTE — PROGRESS NOTE ADULT - ASSESSMENT
47 year old -American male initially presenting with LLE spasm. Hospital course complicated by an episode of unresponsiveness and gaze deviation concerning for seizure vs stroke. He was subsequently intubated for airway protection and extubated. Post extubation, mental status improved, though exam notable for L-sided weakness. EEG showed burst suppression pattern while on sedation.    MRI brain showed bilateral MCA and right PCA infarcts (R>L) in the frontoparietal and occipital areas which could be cardioembolic in source.  MRA head/neck is grossly unremarkable although limited by motion.  TTE done at bedside by ICU team showed positive bubble study with normal EF.  LE doppler negative for DVT bilaterally

## 2017-09-15 NOTE — PROGRESS NOTE ADULT - PROBLEM SELECTOR PLAN 1
JONATAN  ASA 81 mg PO QD  Lipitor 80 mg PO QHS  HbA1c  Tele monitor  PT/OT eval  DVT prophylaxis with lovenox JONATAN  Based on recent data, consider elective PFO closure  Loop recorder can be done as outpatient  ASA 81 mg PO QD  Lipitor 80 mg PO QHS  HbA1c  Tele monitor  PT/OT eval  DVT prophylaxis with lovenox JONATAN  Based on recent data, consider elective PFO closure if PFO is considered large  Loop recorder can be done as outpatient  ASA 81 mg PO QD  Lipitor 80 mg PO QHS  HbA1c  Tele monitor  PT/OT eval  DVT prophylaxis with lovenox JONATAN  Based on recent data, consider elective PFO closure if PFO is large and/or there is an associated atrial septal aneurysm  Loop recorder can be done as outpatient  ASA 81 mg PO QD  Lipitor 80 mg PO QHS  HbA1c  Tele monitor  PT/OT eval and consult Dr. Vannesa Bedoya (physiatry)  DVT prophylaxis with lovenox

## 2017-09-15 NOTE — CONSULT NOTE ADULT - PROBLEM SELECTOR RECOMMENDATION 9
1. PT- bed mobility,transfers, gait and balance training  2. OT- ADL'S  3. CVA- Asa/Lipitor, PFO closure as ner neuro   4. Patient would benefit from acute rehab, needs a multidisciplinary team including PT, OT and speech. Can tolerate 3 hours of therapy a day.  Will follow.

## 2017-09-15 NOTE — CONSULT NOTE ADULT - SUBJECTIVE AND OBJECTIVE BOX
HPI:  Patient is a 47 year old male with a PMHx of sarcoidosis, HTN, COPD, and asthma who presents to the ED with severe leg spasms. These intermittent left leg spasms began suddenly two days ago in the patient's home and have become more regular and worsening in contracture. The spasm began in the quad leading to severe erratic left leg movements that can last from seconds to minutes. Once the leg spasm and contracture stops, an "intense tingling and pain sensation" which starts in the foot and radiates up the left side of his body to his neck. Once the tingling sensation reached his neck, the patient became faint, but did not fully lose consciousness. The patient also had a HA at the time of the spasm and tingling which was diffuse throughout the head and rated a 10/10 on a pain scale. On admission pt found to have moderate inspiratory and expiratory wheezes. Initial CT head negative. Treated for COPD exacerbation. RRT on tele floor for CVA vs. seizure activity. AMS with obtundation, intubated for airway and transfered to MICU. On Propofol/Neosynephrine. Seen by neuro with recs for MRI, EEG 24 hr w/ video, and valproic acid load. Concern for new fat emboli seen on CT head- however no identifable source. 9/13:      bilateral (R>L) MCA and right PCA infarcts in the frontoparietal and occipital areas which could be cardioembolic in source. TTE- positive bubble study    REVIEW OF SYSTEMS: No chest pain, shortness of breath, nausea, vomiting or diarhea.      PAST MEDICAL & SURGICAL HISTORY  History of pneumothorax  COPD (chronic obstructive pulmonary disease)  Asthma  Hypertension  Sarcoidosis  No significant past surgical history      SOCIAL HISTORY  Smoking - Denied, EtOH - Denied, Drugs - Denied    FUNCTIONAL HISTORY:   Lives   Independent PYA     CURRENT FUNCTIONAL STATUS: max a       FAMILY HISTORY   Family history of pancreatic cancer (Father)  Family history of essential hypertension (Father)      RECENT LABS/IMAGING  CBC Full  -  ( 14 Sep 2017 03:40 )  WBC Count : 10.36 K/uL  Hemoglobin : 13.3 g/dL  Hematocrit : 42.2 %  Platelet Count - Automated : 133 K/uL  Mean Cell Volume : 91.7 fL  Mean Cell Hemoglobin : 28.9 pg  Mean Cell Hemoglobin Concentration : 31.5 %  Auto Neutrophil # : 6.88 K/uL  Auto Lymphocyte # : 2.12 K/uL  Auto Monocyte # : 1.27 K/uL  Auto Eosinophil # : 0.03 K/uL  Auto Basophil # : 0.02 K/uL  Auto Neutrophil % : 66.3 %  Auto Lymphocyte % : 20.5 %  Auto Monocyte % : 12.3 %  Auto Eosinophil % : 0.3 %  Auto Basophil % : 0.2 %    09-14    143  |  102  |  28<H>  ----------------------------<  83  4.2   |  31  |  1.10    Ca    9.2      14 Sep 2017 03:40  Phos  3.3     09-14  Mg     2.2     09-14          VITALS  T(C): 37 (09-15-17 @ 07:24), Max: 37 (09-15-17 @ 07:24)  HR: 88 (09-15-17 @ 07:24) (74 - 95)  BP: 109/78 (09-15-17 @ 07:24) (102/81 - 131/85)  RR: 18 (09-15-17 @ 07:24) (12 - 20)  SpO2: 99% (09-15-17 @ 07:24) (95% - 99%)  Wt(kg): --    ALLERGIES  No Known Allergies      MEDICATIONS   atorvastatin 80 milliGRAM(s) Oral at bedtime  enoxaparin Injectable 40 milliGRAM(s) SubCutaneous every 24 hours  valproate sodium IVPB 500 milliGRAM(s) IV Intermittent every 8 hours  predniSONE   Tablet 40 milliGRAM(s) Oral daily  acetaminophen    Suspension 650 milliGRAM(s) Oral every 6 hours PRN  chlorhexidine 4% Liquid 1 Application(s) Topical daily  buDESOnide 160 MICROgram(s)/formoterol 4.5 MICROgram(s) Inhaler 2 Puff(s) Inhalation two times a day  tiotropium 18 MICROgram(s) Capsule 1 Capsule(s) Inhalation daily  ALBUTerol/ipratropium for Nebulization 3 milliLiter(s) Nebulizer every 6 hours PRN  aspirin  chewable 81 milliGRAM(s) Oral daily  piperacillin/tazobactam IVPB. 3.375 Gram(s) IV Intermittent every 8 hours      ----------------------------------------------------------------------------------------  PHYSICAL EXAM  Constitutional - NAD, Comfortable  HEENT - NCAT, EOMI  Neck - Supple, No limited ROM  Chest - CTA bilaterally, No wheeze, No rhonchi, No crackles  Cardiovascular - RRR, S1S2, No murmurs  Abdomen - BS+, Soft, NTND  Extremities - No C/C/E, No calf tenderness   Neurologic Exam -                    Cognitive - Awake, Alert, AAO to self, place, date, year, situation     Communication - Fluent, No dysarthria, no aphasia     Cranial Nerves - CN 2-12 intact     Motor - No focal deficits                       Sensory - Intact to LT     Reflexes - DTR Intact, No primitive reflexive     Balance - WNL Static  Psychiatric - Mood stable, Affect WNL HPI:  Patient is a 47 year old male with a PMHx of sarcoidosis, HTN, COPD, and asthma who presents to the ED with severe leg spasms. These intermittent left leg spasms began suddenly two days ago in the patient's home and have become more regular and worsening in contracture. The spasm began in the quad leading to severe erratic left leg movements that can last from seconds to minutes. Once the leg spasm and contracture stops, an "intense tingling and pain sensation" which starts in the foot and radiates up the left side of his body to his neck. Once the tingling sensation reached his neck, the patient became faint, but did not fully lose consciousness. The patient also had a HA at the time of the spasm and tingling which was diffuse throughout the head and rated a 10/10 on a pain scale. On admission pt found to have moderate inspiratory and expiratory wheezes. Initial CT head negative. Treated for COPD exacerbation. RRT on tele floor for CVA vs. seizure activity. AMS with obtundation, intubated for airway and transfered to MICU. On Propofol/Neosynephrine. Seen by neuro with recs for MRI, EEG 24 hr w/ video, and valproic acid load. Concern for new fat emboli seen on CT head- however no identifable source. 9/13:      bilateral (R>L) MCA and right PCA infarcts in the frontoparietal and occipital areas which could be cardioembolic in source. TTE- positive bubble study    REVIEW OF SYSTEMS: No chest pain, shortness of breath, nausea, vomiting or diarhea.      PAST MEDICAL & SURGICAL HISTORY  History of pneumothorax  COPD (chronic obstructive pulmonary disease)  Asthma  Hypertension  Sarcoidosis  No significant past surgical history      SOCIAL HISTORY  Smoking - Denied, EtOH - Denied, Drugs - Denied    FUNCTIONAL HISTORY:   Lives alone/with GF  Independent PTA     CURRENT FUNCTIONAL STATUS: max a       FAMILY HISTORY   Family history of pancreatic cancer (Father)  Family history of essential hypertension (Father)      RECENT LABS/IMAGING  CBC Full  -  ( 14 Sep 2017 03:40 )  WBC Count : 10.36 K/uL  Hemoglobin : 13.3 g/dL  Hematocrit : 42.2 %  Platelet Count - Automated : 133 K/uL  Mean Cell Volume : 91.7 fL  Mean Cell Hemoglobin : 28.9 pg  Mean Cell Hemoglobin Concentration : 31.5 %  Auto Neutrophil # : 6.88 K/uL  Auto Lymphocyte # : 2.12 K/uL  Auto Monocyte # : 1.27 K/uL  Auto Eosinophil # : 0.03 K/uL  Auto Basophil # : 0.02 K/uL  Auto Neutrophil % : 66.3 %  Auto Lymphocyte % : 20.5 %  Auto Monocyte % : 12.3 %  Auto Eosinophil % : 0.3 %  Auto Basophil % : 0.2 %    09-14    143  |  102  |  28<H>  ----------------------------<  83  4.2   |  31  |  1.10    Ca    9.2      14 Sep 2017 03:40  Phos  3.3     09-14  Mg     2.2     09-14          VITALS  T(C): 37 (09-15-17 @ 07:24), Max: 37 (09-15-17 @ 07:24)  HR: 88 (09-15-17 @ 07:24) (74 - 95)  BP: 109/78 (09-15-17 @ 07:24) (102/81 - 131/85)  RR: 18 (09-15-17 @ 07:24) (12 - 20)  SpO2: 99% (09-15-17 @ 07:24) (95% - 99%)  Wt(kg): --    ALLERGIES  No Known Allergies      MEDICATIONS   atorvastatin 80 milliGRAM(s) Oral at bedtime  enoxaparin Injectable 40 milliGRAM(s) SubCutaneous every 24 hours  valproate sodium IVPB 500 milliGRAM(s) IV Intermittent every 8 hours  predniSONE   Tablet 40 milliGRAM(s) Oral daily  acetaminophen    Suspension 650 milliGRAM(s) Oral every 6 hours PRN  chlorhexidine 4% Liquid 1 Application(s) Topical daily  buDESOnide 160 MICROgram(s)/formoterol 4.5 MICROgram(s) Inhaler 2 Puff(s) Inhalation two times a day  tiotropium 18 MICROgram(s) Capsule 1 Capsule(s) Inhalation daily  ALBUTerol/ipratropium for Nebulization 3 milliLiter(s) Nebulizer every 6 hours PRN  aspirin  chewable 81 milliGRAM(s) Oral daily  piperacillin/tazobactam IVPB. 3.375 Gram(s) IV Intermittent every 8 hours      ----------------------------------------------------------------------------------------  PHYSICAL EXAM  Constitutional - NAD, Comfortable  HEENT - NCAT, EOMI  Neck - Supple, No limited ROM  Chest - CTA bilaterally, No wheeze, No rhonchi, No crackles  Cardiovascular - RRR, S1S2, No murmurs  Abdomen - BS+, Soft, NTND  Extremities - No C/C/E, No calf tenderness   Neurologic Exam -                    Cognitive - Awake, Alert, AAO to self, place, date, year, situation     Communication - Fluent, No dysarthria, no aphasia     Cranial Nerves - CN 2-12 intact     Motor - LUE 0/5, LLE 3/5, RUE/RLE 5/5                       Sensory - Intact to LT     Reflexes - DTR Intact, No primitive reflexive     Balance - poor   Psychiatric - Mood stable, Affect WNL

## 2017-09-15 NOTE — PROGRESS NOTE ADULT - PROBLEM SELECTOR PLAN 1
bilateral MCA and right PCA infarcts (R>L) in the frontoparietal and occipital areas which could be cardioembolic in source  ASA 81 mg PO QD  Lipitor 80 mg PO QHS  check HbA1c  Tele monitor  PT/OT eval  follow up neuro rec

## 2017-09-16 LAB
ANA PAT FLD IF-IMP: SIGNIFICANT CHANGE UP
ANA TITR SER: SIGNIFICANT CHANGE UP
BUN SERPL-MCNC: 22 MG/DL — SIGNIFICANT CHANGE UP (ref 7–23)
C-ANCA SER-ACNC: NEGATIVE — SIGNIFICANT CHANGE UP
CALCIUM SERPL-MCNC: 9.3 MG/DL — SIGNIFICANT CHANGE UP (ref 8.4–10.5)
CHLORIDE SERPL-SCNC: 98 MMOL/L — SIGNIFICANT CHANGE UP (ref 98–107)
CO2 SERPL-SCNC: 34 MMOL/L — HIGH (ref 22–31)
CREAT SERPL-MCNC: 1.05 MG/DL — SIGNIFICANT CHANGE UP (ref 0.5–1.3)
GLUCOSE SERPL-MCNC: 85 MG/DL — SIGNIFICANT CHANGE UP (ref 70–99)
HBA1C BLD-MCNC: 5.8 % — HIGH (ref 4–5.6)
HCT VFR BLD CALC: 42.3 % — SIGNIFICANT CHANGE UP (ref 39–50)
HGB BLD-MCNC: 13.3 G/DL — SIGNIFICANT CHANGE UP (ref 13–17)
MAGNESIUM SERPL-MCNC: 1.9 MG/DL — SIGNIFICANT CHANGE UP (ref 1.6–2.6)
MCHC RBC-ENTMCNC: 28.4 PG — SIGNIFICANT CHANGE UP (ref 27–34)
MCHC RBC-ENTMCNC: 31.4 % — LOW (ref 32–36)
MCV RBC AUTO: 90.4 FL — SIGNIFICANT CHANGE UP (ref 80–100)
NRBC # FLD: 0 — SIGNIFICANT CHANGE UP
P-ANCA SER-ACNC: NEGATIVE — SIGNIFICANT CHANGE UP
PLATELET # BLD AUTO: 131 K/UL — LOW (ref 150–400)
PMV BLD: 12.8 FL — SIGNIFICANT CHANGE UP (ref 7–13)
POTASSIUM SERPL-MCNC: 3.8 MMOL/L — SIGNIFICANT CHANGE UP (ref 3.5–5.3)
POTASSIUM SERPL-SCNC: 3.8 MMOL/L — SIGNIFICANT CHANGE UP (ref 3.5–5.3)
RBC # BLD: 4.68 M/UL — SIGNIFICANT CHANGE UP (ref 4.2–5.8)
RBC # FLD: 12.3 % — SIGNIFICANT CHANGE UP (ref 10.3–14.5)
SODIUM SERPL-SCNC: 142 MMOL/L — SIGNIFICANT CHANGE UP (ref 135–145)
WBC # BLD: 6.41 K/UL — SIGNIFICANT CHANGE UP (ref 3.8–10.5)
WBC # FLD AUTO: 6.41 K/UL — SIGNIFICANT CHANGE UP (ref 3.8–10.5)

## 2017-09-16 PROCEDURE — 99233 SBSQ HOSP IP/OBS HIGH 50: CPT

## 2017-09-16 RX ORDER — PANTOPRAZOLE SODIUM 20 MG/1
40 TABLET, DELAYED RELEASE ORAL
Qty: 0 | Refills: 0 | Status: DISCONTINUED | OUTPATIENT
Start: 2017-09-16 | End: 2017-09-26

## 2017-09-16 RX ORDER — LACTULOSE 10 G/15ML
10 SOLUTION ORAL ONCE
Qty: 0 | Refills: 0 | Status: COMPLETED | OUTPATIENT
Start: 2017-09-16 | End: 2017-09-16

## 2017-09-16 RX ADMIN — DIVALPROEX SODIUM 500 MILLIGRAM(S): 500 TABLET, DELAYED RELEASE ORAL at 17:42

## 2017-09-16 RX ADMIN — BUDESONIDE AND FORMOTEROL FUMARATE DIHYDRATE 2 PUFF(S): 160; 4.5 AEROSOL RESPIRATORY (INHALATION) at 09:00

## 2017-09-16 RX ADMIN — LACTULOSE 10 GRAM(S): 10 SOLUTION ORAL at 01:22

## 2017-09-16 RX ADMIN — DIVALPROEX SODIUM 500 MILLIGRAM(S): 500 TABLET, DELAYED RELEASE ORAL at 06:18

## 2017-09-16 RX ADMIN — BUDESONIDE AND FORMOTEROL FUMARATE DIHYDRATE 2 PUFF(S): 160; 4.5 AEROSOL RESPIRATORY (INHALATION) at 21:53

## 2017-09-16 RX ADMIN — ATORVASTATIN CALCIUM 80 MILLIGRAM(S): 80 TABLET, FILM COATED ORAL at 21:54

## 2017-09-16 RX ADMIN — PIPERACILLIN AND TAZOBACTAM 25 GRAM(S): 4; .5 INJECTION, POWDER, LYOPHILIZED, FOR SOLUTION INTRAVENOUS at 06:18

## 2017-09-16 RX ADMIN — Medication 30 MILLILITER(S): at 21:53

## 2017-09-16 RX ADMIN — CHLORHEXIDINE GLUCONATE 1 APPLICATION(S): 213 SOLUTION TOPICAL at 13:13

## 2017-09-16 RX ADMIN — PIPERACILLIN AND TAZOBACTAM 25 GRAM(S): 4; .5 INJECTION, POWDER, LYOPHILIZED, FOR SOLUTION INTRAVENOUS at 15:36

## 2017-09-16 RX ADMIN — PIPERACILLIN AND TAZOBACTAM 25 GRAM(S): 4; .5 INJECTION, POWDER, LYOPHILIZED, FOR SOLUTION INTRAVENOUS at 21:53

## 2017-09-16 RX ADMIN — ENOXAPARIN SODIUM 40 MILLIGRAM(S): 100 INJECTION SUBCUTANEOUS at 13:12

## 2017-09-16 RX ADMIN — Medication 81 MILLIGRAM(S): at 13:13

## 2017-09-16 RX ADMIN — Medication 40 MILLIGRAM(S): at 06:17

## 2017-09-16 NOTE — PROGRESS NOTE ADULT - SUBJECTIVE AND OBJECTIVE BOX
Patient is a 47y old  Male who presents with a chief complaint of "My leg wouldn't stop shaking." (14 Sep 2017 09:59)    SUBJECTIVE / OVERNIGHT EVENTS:  Reported continued symptoms of right blurred vision worse than left eye (not new). Still with left sided weakness. No new complaints. Pending transesophageal echo on Monday.    MEDICATIONS  (STANDING):  atorvastatin 80 milliGRAM(s) Oral at bedtime  enoxaparin Injectable 40 milliGRAM(s) SubCutaneous every 24 hours  predniSONE   Tablet 40 milliGRAM(s) Oral daily  chlorhexidine 4% Liquid 1 Application(s) Topical daily  aspirin  chewable 81 milliGRAM(s) Oral daily  piperacillin/tazobactam IVPB. 3.375 Gram(s) IV Intermittent every 8 hours  diVALproex  milliGRAM(s) Oral two times a day  buDESOnide  80 MICROgram(s)/formoterol 4.5 MICROgram(s) Inhaler 2 Puff(s) Inhalation two times a day  polyethylene glycol 3350 17 Gram(s) Oral at bedtime    MEDICATIONS  (PRN):  acetaminophen    Suspension 650 milliGRAM(s) Oral every 6 hours PRN For Temp greater than 38 C (100.4 F)  ALBUTerol/ipratropium for Nebulization 3 milliLiter(s) Nebulizer every 6 hours PRN Shortness of Breath and/or Wheezing      PHYSICAL EXAM:  GENERAL: NAD, well-developed  HEAD:  Atraumatic, Normocephalic  NECK: Supple, No JVD  CHEST/LUNG: Clear to auscultation bilaterally; No wheeze  HEART: s1 s2, regular rhythm and rate   ABDOMEN: Soft, Nontender, Nondistended; Bowel sounds present  EXTREMITIES:  2+ Peripheral Pulses, No clubbing, cyanosis, or edema  PSYCH: AAOx3, calm   NEUROLOGY: strength LUE 0/5, LLE 3/5; reported poor visual acuity bilaterally R>L.  SKIN: No rashes or lesions    LABS:                        13.3   6.41  )-----------( 131      ( 16 Sep 2017 06:10 )             42.3     09-16    142  |  98  |  22  ----------------------------<  85  3.8   |  34<H>  |  1.05    Ca    9.3      16 Sep 2017 06:10  Mg     1.9     09-16      RADIOLOGY & ADDITIONAL TESTS:  < from: MRI Head w/wo Cont (09.13.17 @ 16:16) >  EXAM:  MRI BRAIN W-W O CONTRAST        PROCEDURE DATE:  Sep 13 2017         INTERPRETATION:  HISTORY:   History of sarcoidosis. Patient became   unresponsive. Stroke versus seizure. Seizure-like activity.. .    Description: MRI of the brain with and without gadolinium contrast was   performed.    COMPARISON: Head CT 09/12/2017..    Sagittal T1, axial T1, T2, FLAIR, SWI, and diffusion-weighted series were   obtained before contrast. After intravenous gadolinium contrast   administration, sagittal, coronal, and axial T1 postcontrast series were   obtained.    The study is markedly limited by motion.    On the diffusion-weighted series and ADC maps, acute infarction involves   the right greater than left frontal, parietal, and occipital lobes,  without hemorrhagic transformation. Infarct appear in a watershed   distribution.    There is no gross evidence for large enhancing mass, acute hemorrhage, or   hydrocephalus within the limitations of this study.    The punctate foci of intracranial air on the prior head CT have grossly   resolved over the time interval.    I discussed the exam findings with Dr. Simons at 4:35 PM on 09/13/2017 with   read back.    IMPRESSION:    Areas of acute infarction involve the right greater than left frontal,   parietal, and occipital lobes, without hemorrhagic transformation.    HECTOR WU M.D., ATTENDING RADIOLOGIST  This document has been electronically signed. Sep 13 2017  4:35PM    < end of copied text >    Consultant(s) Notes Reviewed:  Pulmonary and Neurology.

## 2017-09-16 NOTE — PROGRESS NOTE ADULT - SUBJECTIVE AND OBJECTIVE BOX
Patient is a 47y old  Male who presents with a chief complaint of "My leg wouldn't stop shaking." (14 Sep 2017 09:59)  doing ok  wheezing minimal today   per him he was not on any treatment for steroids     Any change in ROS:     MEDICATIONS  (STANDING):  atorvastatin 80 milliGRAM(s) Oral at bedtime  enoxaparin Injectable 40 milliGRAM(s) SubCutaneous every 24 hours  predniSONE   Tablet 40 milliGRAM(s) Oral daily  chlorhexidine 4% Liquid 1 Application(s) Topical daily  aspirin  chewable 81 milliGRAM(s) Oral daily  piperacillin/tazobactam IVPB. 3.375 Gram(s) IV Intermittent every 8 hours  diVALproex  milliGRAM(s) Oral two times a day  buDESOnide  80 MICROgram(s)/formoterol 4.5 MICROgram(s) Inhaler 2 Puff(s) Inhalation two times a day  polyethylene glycol 3350 17 Gram(s) Oral at bedtime    MEDICATIONS  (PRN):  acetaminophen    Suspension 650 milliGRAM(s) Oral every 6 hours PRN For Temp greater than 38 C (100.4 F)  ALBUTerol/ipratropium for Nebulization 3 milliLiter(s) Nebulizer every 6 hours PRN Shortness of Breath and/or Wheezing    Vital Signs Last 24 Hrs  T(C): 37 (16 Sep 2017 06:15), Max: 37.2 (15 Sep 2017 15:11)  T(F): 98.6 (16 Sep 2017 06:15), Max: 99 (15 Sep 2017 15:11)  HR: 76 (16 Sep 2017 06:15) (76 - 96)  BP: 118/68 (16 Sep 2017 06:15) (118/68 - 148/96)  BP(mean): --  RR: 18 (16 Sep 2017 06:15) (17 - 18)  SpO2: 99% (16 Sep 2017 06:15) (99% - 100%)    I&O's Summary        Physical Exam:   GENERAL: NAD, well-groomed, well-developed  HEENT: GIOVANI/   Atraumatic, Normocephalic  ENMT: No tonsillar erythema, exudates, or enlargement; Moist mucous membranes, Good dentition, No lesions  NECK: Supple, No JVD, Normal thyroid  CHEST/LUNG: mild coarse rhonchi  CVS: Regular rate and rhythm; No murmurs, rubs, or gallops  GI: : Soft, Nontender, Nondistended; Bowel sounds present  NERVOUS SYSTEM:  Alert & Oriented X3  EXTREMITIES:  2+ Peripheral Pulses, No clubbing, cyanosis, or edema  LYMPH: No lymphadenopathy noted  SKIN: No rashes or lesions  ENDOCRINOLOGY: No Thyromegaly  PSYCH: Appropriate    Labs:  25                            13.3   6.41  )-----------( 131      ( 16 Sep 2017 06:10 )             42.3                         14.0   7.68  )-----------( 143      ( 15 Sep 2017 14:45 )             44.2                         13.3   10.36 )-----------( 133      ( 14 Sep 2017 03:40 )             42.2                         13.1   15.15 )-----------( 161      ( 13 Sep 2017 05:50 )             41.8     09-16    142  |  98  |  22  ----------------------------<  85  3.8   |  34<H>  |  1.05  09-15    141  |  97<L>  |  22  ----------------------------<  126<H>  4.2   |  29  |  1.04  09-14    143  |  102  |  28<H>  ----------------------------<  83  4.2   |  31  |  1.10  09-13    143  |  102  |  24<H>  ----------------------------<  87  4.5   |  30  |  1.16    Ca    9.3      16 Sep 2017 06:10  Ca    9.6      15 Sep 2017 14:45  Mg     1.9     09-16  Mg     2.0     09-15    TPro  7.5  /  Alb  3.5  /  TBili  0.4  /  DBili  x   /  AST  17  /  ALT  9   /  AlkPhos  51  09-13    CAPILLARY BLOOD GLUCOSE                Lactate, Blood: 2.0 mmol/L (09-13 @ 05:50)  Cultures:                       Rapid Respiratory Viral Panel Result        09-11 @ 02:00  Rapid RVP --  Coronovirus --  Adenovirus --  Bordetella Pertussis --  Chlamydia Pneumonia --  Entero/RhinovirusPOSITIVE  HKU1 Coronovirus --  HMPV Coronovirus --  Influenza A --  Influenza AH1 --  Influenza AH1 2009 --  Influenza AH3 --  Influenza B --  Mycoplasma Pneumoniae --  NL63 Coronovirus --  OC43 Coronovirus --  Parainfluenza 1 --  Parainfluenza 2 --  Parainfluenza 3 --  Parainfluenza 4 --  Resp Syncytial Virus --          Studies  Chest X-RAY  CT SCAN Chest   Venous Dopplers: LE:   CT Abdomen  Others

## 2017-09-16 NOTE — PROGRESS NOTE ADULT - PROBLEM SELECTOR PLAN 1
bilateral MCA and right PCA infarcts (R>L) in the frontoparietal and occipital areas which could be cardioembolic in source. pending JONATAN on monday.  ASA 81 mg PO QD  Lipitor 80 mg PO QHS  check HbA1c  Tele monitor  PT/OT eval  follow up neuro rec

## 2017-09-16 NOTE — PROGRESS NOTE ADULT - PROBLEM SELECTOR PLAN 5
successfully extubated:  pt is already on steroids  will dc after tomorrows dose: he wasn ot on any treanmten for sarciodosis: he will follow up with his pulmonologist following dc

## 2017-09-17 LAB
BUN SERPL-MCNC: 20 MG/DL — SIGNIFICANT CHANGE UP (ref 7–23)
CALCIUM SERPL-MCNC: 9.3 MG/DL — SIGNIFICANT CHANGE UP (ref 8.4–10.5)
CHLORIDE SERPL-SCNC: 98 MMOL/L — SIGNIFICANT CHANGE UP (ref 98–107)
CO2 SERPL-SCNC: 34 MMOL/L — HIGH (ref 22–31)
CREAT SERPL-MCNC: 1.02 MG/DL — SIGNIFICANT CHANGE UP (ref 0.5–1.3)
GLUCOSE SERPL-MCNC: 91 MG/DL — SIGNIFICANT CHANGE UP (ref 70–99)
HCT VFR BLD CALC: 41 % — SIGNIFICANT CHANGE UP (ref 39–50)
HGB BLD-MCNC: 13.1 G/DL — SIGNIFICANT CHANGE UP (ref 13–17)
MAGNESIUM SERPL-MCNC: 2 MG/DL — SIGNIFICANT CHANGE UP (ref 1.6–2.6)
MCHC RBC-ENTMCNC: 28.9 PG — SIGNIFICANT CHANGE UP (ref 27–34)
MCHC RBC-ENTMCNC: 32 % — SIGNIFICANT CHANGE UP (ref 32–36)
MCV RBC AUTO: 90.5 FL — SIGNIFICANT CHANGE UP (ref 80–100)
NRBC # FLD: 0 — SIGNIFICANT CHANGE UP
PHOSPHATE SERPL-MCNC: 2.3 MG/DL — LOW (ref 2.5–4.5)
PLATELET # BLD AUTO: 115 K/UL — LOW (ref 150–400)
PMV BLD: 12.4 FL — SIGNIFICANT CHANGE UP (ref 7–13)
POTASSIUM SERPL-MCNC: 3.9 MMOL/L — SIGNIFICANT CHANGE UP (ref 3.5–5.3)
POTASSIUM SERPL-SCNC: 3.9 MMOL/L — SIGNIFICANT CHANGE UP (ref 3.5–5.3)
RBC # BLD: 4.53 M/UL — SIGNIFICANT CHANGE UP (ref 4.2–5.8)
RBC # FLD: 12.2 % — SIGNIFICANT CHANGE UP (ref 10.3–14.5)
SODIUM SERPL-SCNC: 141 MMOL/L — SIGNIFICANT CHANGE UP (ref 135–145)
WBC # BLD: 5.97 K/UL — SIGNIFICANT CHANGE UP (ref 3.8–10.5)
WBC # FLD AUTO: 5.97 K/UL — SIGNIFICANT CHANGE UP (ref 3.8–10.5)

## 2017-09-17 PROCEDURE — 99233 SBSQ HOSP IP/OBS HIGH 50: CPT

## 2017-09-17 RX ADMIN — PIPERACILLIN AND TAZOBACTAM 25 GRAM(S): 4; .5 INJECTION, POWDER, LYOPHILIZED, FOR SOLUTION INTRAVENOUS at 14:04

## 2017-09-17 RX ADMIN — BUDESONIDE AND FORMOTEROL FUMARATE DIHYDRATE 2 PUFF(S): 160; 4.5 AEROSOL RESPIRATORY (INHALATION) at 14:04

## 2017-09-17 RX ADMIN — PIPERACILLIN AND TAZOBACTAM 25 GRAM(S): 4; .5 INJECTION, POWDER, LYOPHILIZED, FOR SOLUTION INTRAVENOUS at 06:16

## 2017-09-17 RX ADMIN — ENOXAPARIN SODIUM 40 MILLIGRAM(S): 100 INJECTION SUBCUTANEOUS at 14:04

## 2017-09-17 RX ADMIN — DIVALPROEX SODIUM 500 MILLIGRAM(S): 500 TABLET, DELAYED RELEASE ORAL at 06:16

## 2017-09-17 RX ADMIN — BUDESONIDE AND FORMOTEROL FUMARATE DIHYDRATE 2 PUFF(S): 160; 4.5 AEROSOL RESPIRATORY (INHALATION) at 22:11

## 2017-09-17 RX ADMIN — Medication 81 MILLIGRAM(S): at 14:04

## 2017-09-17 RX ADMIN — CHLORHEXIDINE GLUCONATE 1 APPLICATION(S): 213 SOLUTION TOPICAL at 13:40

## 2017-09-17 RX ADMIN — Medication 40 MILLIGRAM(S): at 06:17

## 2017-09-17 RX ADMIN — PANTOPRAZOLE SODIUM 40 MILLIGRAM(S): 20 TABLET, DELAYED RELEASE ORAL at 06:17

## 2017-09-17 RX ADMIN — DIVALPROEX SODIUM 500 MILLIGRAM(S): 500 TABLET, DELAYED RELEASE ORAL at 17:05

## 2017-09-17 RX ADMIN — ATORVASTATIN CALCIUM 80 MILLIGRAM(S): 80 TABLET, FILM COATED ORAL at 22:11

## 2017-09-17 NOTE — PROGRESS NOTE ADULT - SUBJECTIVE AND OBJECTIVE BOX
Patient is a 47y old  Male who presents with a chief complaint of "My leg wouldn't stop shaking." (14 Sep 2017 09:59)      SUBJECTIVE / OVERNIGHT EVENTS:  No new complaints since yesterday. Reported improved range of motion and strength in left upper extremity. Visual changes unchanged.    MEDICATIONS  (STANDING):  atorvastatin 80 milliGRAM(s) Oral at bedtime  enoxaparin Injectable 40 milliGRAM(s) SubCutaneous every 24 hours  chlorhexidine 4% Liquid 1 Application(s) Topical daily  aspirin  chewable 81 milliGRAM(s) Oral daily  piperacillin/tazobactam IVPB. 3.375 Gram(s) IV Intermittent every 8 hours  diVALproex  milliGRAM(s) Oral two times a day  buDESOnide  80 MICROgram(s)/formoterol 4.5 MICROgram(s) Inhaler 2 Puff(s) Inhalation two times a day  polyethylene glycol 3350 17 Gram(s) Oral at bedtime  pantoprazole    Tablet 40 milliGRAM(s) Oral before breakfast    MEDICATIONS  (PRN):  acetaminophen    Suspension 650 milliGRAM(s) Oral every 6 hours PRN For Temp greater than 38 C (100.4 F)  ALBUTerol/ipratropium for Nebulization 3 milliLiter(s) Nebulizer every 6 hours PRN Shortness of Breath and/or Wheezing  aluminum hydroxide/magnesium hydroxide/simethicone Suspension 30 milliLiter(s) Oral every 6 hours PRN Dyspepsia      PHYSICAL EXAM:  GENERAL: NAD, well-developed  HEAD:  Atraumatic, Normocephalic  EYES: EOMI, PERRLA, conjunctiva and sclera clear  NECK: Supple, No JVD  CHEST/LUNG: Clear to auscultation bilaterally; No wheeze  HEART: Regular rate and rhythm; No murmurs, rubs, or gallops  ABDOMEN: Soft, Nontender, Nondistended; Bowel sounds present  EXTREMITIES:  2+ Peripheral Pulses, No clubbing, cyanosis, or edema  PSYCH: AAOx3  NEUROLOGY: strength LUE 0/5, LLE 3/5; reported poor visual acuity bilaterally R>L.  SKIN: No rashes or lesions  SKIN: No rashes or lesions    LABS:                        13.1   5.97  )-----------( 115      ( 17 Sep 2017 06:35 )             41.0     09-17    141  |  98  |  20  ----------------------------<  91  3.9   |  34<H>  |  1.02    Ca    9.3      17 Sep 2017 06:35  Phos  2.3     09-17  Mg     2.0     09-17      RADIOLOGY & ADDITIONAL TESTS:    Imaging Personally Reviewed:    Consultant(s) Notes Reviewed:  Pulmonary

## 2017-09-17 NOTE — PROGRESS NOTE ADULT - PROBLEM SELECTOR PLAN 5
successfully extubated:  pt is already on steroids  will dc after tomorrows dose: he was not on any treatment for sarciodosis: he will follow up with his pulmonologist following dc

## 2017-09-17 NOTE — PROGRESS NOTE ADULT - SUBJECTIVE AND OBJECTIVE BOX
Patient is a 47y old  Male who presents with a chief complaint of "My leg wouldn't stop shaking." (14 Sep 2017 09:59)    Alert and laughing  no wheezing   Any change in ROS:     MEDICATIONS  (STANDING):  atorvastatin 80 milliGRAM(s) Oral at bedtime  enoxaparin Injectable 40 milliGRAM(s) SubCutaneous every 24 hours  predniSONE   Tablet 40 milliGRAM(s) Oral daily  chlorhexidine 4% Liquid 1 Application(s) Topical daily  aspirin  chewable 81 milliGRAM(s) Oral daily  piperacillin/tazobactam IVPB. 3.375 Gram(s) IV Intermittent every 8 hours  diVALproex  milliGRAM(s) Oral two times a day  buDESOnide  80 MICROgram(s)/formoterol 4.5 MICROgram(s) Inhaler 2 Puff(s) Inhalation two times a day  polyethylene glycol 3350 17 Gram(s) Oral at bedtime  pantoprazole    Tablet 40 milliGRAM(s) Oral before breakfast    MEDICATIONS  (PRN):  acetaminophen    Suspension 650 milliGRAM(s) Oral every 6 hours PRN For Temp greater than 38 C (100.4 F)  ALBUTerol/ipratropium for Nebulization 3 milliLiter(s) Nebulizer every 6 hours PRN Shortness of Breath and/or Wheezing  aluminum hydroxide/magnesium hydroxide/simethicone Suspension 30 milliLiter(s) Oral every 6 hours PRN Dyspepsia    Vital Signs Last 24 Hrs  T(C): 37.1 (17 Sep 2017 06:15), Max: 37.1 (17 Sep 2017 06:15)  T(F): 98.7 (17 Sep 2017 06:15), Max: 98.7 (17 Sep 2017 06:15)  HR: 66 (17 Sep 2017 06:15) (66 - 77)  BP: 107/73 (17 Sep 2017 06:15) (102/68 - 120/84)  BP(mean): --  RR: 17 (17 Sep 2017 06:15) (17 - 18)  SpO2: 100% (17 Sep 2017 06:15) (98% - 100%)    I&O's Summary        Physical Exam:   GENERAL: NAD, well-groomed, well-developed  HEENT: GIOVANI/   Atraumatic, Normocephalic  ENMT: No tonsillar erythema, exudates, or enlargement; Moist mucous membranes, Good dentition, No lesions  NECK: Supple, No JVD, Normal thyroid  CHEST/LUNG: coarse breath sounds  CVS: Regular rate and rhythm; No murmurs, rubs, or gallops  GI: : Soft, Nontender, Nondistended; Bowel sounds present  NERVOUS SYSTEM:  Alert & Oriented X3  EXTREMITIES:  2+ Peripheral Pulses, No clubbing, cyanosis, or edema  LYMPH: No lymphadenopathy noted  SKIN: No rashes or lesions  ENDOCRINOLOGY: No Thyromegaly  PSYCH: Appropriate    Labs:  25                            13.1   5.97  )-----------( 115      ( 17 Sep 2017 06:35 )             41.0                         13.3   6.41  )-----------( 131      ( 16 Sep 2017 06:10 )             42.3                         14.0   7.68  )-----------( 143      ( 15 Sep 2017 14:45 )             44.2                         13.3   10.36 )-----------( 133      ( 14 Sep 2017 03:40 )             42.2     09-17    141  |  98  |  20  ----------------------------<  91  3.9   |  34<H>  |  1.02  09-16    142  |  98  |  22  ----------------------------<  85  3.8   |  34<H>  |  1.05  09-15    141  |  97<L>  |  22  ----------------------------<  126<H>  4.2   |  29  |  1.04  09-14    143  |  102  |  28<H>  ----------------------------<  83  4.2   |  31  |  1.10    Ca    9.3      17 Sep 2017 06:35  Ca    9.3      16 Sep 2017 06:10  Ca    9.6      15 Sep 2017 14:45  Phos  2.3     09-17  Mg     2.0     09-17  Mg     1.9     09-16  Mg     2.0     09-15      CAPILLARY BLOOD GLUCOSE                Cultures:       < from: MRI Head w/wo Cont (09.13.17 @ 16:16) >  RISON: Head CT 09/12/2017..    Sagittal T1, axial T1, T2, FLAIR, SWI, and diffusion-weighted series were   obtained before contrast. After intravenous gadolinium contrast   administration, sagittal, coronal, and axial T1 postcontrast series were   obtained.    The study is markedly limited by motion.    On the diffusion-weighted series and ADC maps, acute infarction involves   the right greater than left frontal, parietal, and occipital lobes,  without hemorrhagic transformation. Infarct appear in a watershed   distribution.    There is no gross evidence for large enhancing mass, acute hemorrhage, or   hydrocephalus within the limitations of this study.    The punctate foci of intracranial air on the prior head CT have grossly   resolved over the time interval.    I discussed the exam findings with Dr. Simons at 4:35 PM on 09/13/2017 with   read back.    IMPRESSION:    Areas of acute infarction involve the right greater than left frontal,   parietal, and occipital lobes, without hemorrhagic transformation.                  HECTOR WU M.D., ATTENDING RADIOLOGIST  This document has been electronically signed. Sep 13 2017  4:35PM        < end of copied text >                  Rapid Respiratory Viral Panel Result        09-11 @ 02:00  Rapid RVP --  Coronovirus --  Adenovirus --  Bordetella Pertussis --  Chlamydia Pneumonia --  Entero/RhinovirusPOSITIVE  HKU1 Coronovirus --  HMPV Coronovirus --  Influenza A --  Influenza AH1 --  Influenza AH1 2009 --  Influenza AH3 --  Influenza B --  Mycoplasma Pneumoniae --  NL63 Coronovirus --  OC43 Coronovirus --  Parainfluenza 1 --  Parainfluenza 2 --  Parainfluenza 3 --  Parainfluenza 4 --  Resp Syncytial Virus --          Studies  Chest X-RAY  CT SCAN Chest   Venous Dopplers: LE:   CT Abdomen  Others

## 2017-09-17 NOTE — PROGRESS NOTE ADULT - PROBLEM SELECTOR PLAN 3
supportive care: may be the reason for this wheezing exacerbation!!  He is still on Zosyn D4  ? DC would defer to PMD

## 2017-09-17 NOTE — PROGRESS NOTE ADULT - PROBLEM SELECTOR PLAN 1
bilateral MCA and right PCA infarcts (R>L) in the frontoparietal and occipital areas which could be cardioembolic in source. pending JONATAN on monday, NPO after MN.  ASA 81 mg PO QD  Lipitor 80 mg PO QHS  check HbA1c  Tele monitor  PT/OT eval  follow up neuro rec

## 2017-09-18 LAB
BACTERIA BLD CULT: SIGNIFICANT CHANGE UP
BUN SERPL-MCNC: 20 MG/DL — SIGNIFICANT CHANGE UP (ref 7–23)
CALCIUM SERPL-MCNC: 8.9 MG/DL — SIGNIFICANT CHANGE UP (ref 8.4–10.5)
CHLORIDE SERPL-SCNC: 100 MMOL/L — SIGNIFICANT CHANGE UP (ref 98–107)
CO2 SERPL-SCNC: 31 MMOL/L — SIGNIFICANT CHANGE UP (ref 22–31)
CREAT SERPL-MCNC: 1.02 MG/DL — SIGNIFICANT CHANGE UP (ref 0.5–1.3)
GLUCOSE SERPL-MCNC: 82 MG/DL — SIGNIFICANT CHANGE UP (ref 70–99)
HCT VFR BLD CALC: 40.4 % — SIGNIFICANT CHANGE UP (ref 39–50)
HGB BLD-MCNC: 12.7 G/DL — LOW (ref 13–17)
MAGNESIUM SERPL-MCNC: 1.9 MG/DL — SIGNIFICANT CHANGE UP (ref 1.6–2.6)
MCHC RBC-ENTMCNC: 28.4 PG — SIGNIFICANT CHANGE UP (ref 27–34)
MCHC RBC-ENTMCNC: 31.4 % — LOW (ref 32–36)
MCV RBC AUTO: 90.4 FL — SIGNIFICANT CHANGE UP (ref 80–100)
NRBC # FLD: 0 — SIGNIFICANT CHANGE UP
PLATELET # BLD AUTO: 112 K/UL — LOW (ref 150–400)
PMV BLD: 12.6 FL — SIGNIFICANT CHANGE UP (ref 7–13)
POTASSIUM SERPL-MCNC: 3.7 MMOL/L — SIGNIFICANT CHANGE UP (ref 3.5–5.3)
POTASSIUM SERPL-SCNC: 3.7 MMOL/L — SIGNIFICANT CHANGE UP (ref 3.5–5.3)
RBC # BLD: 4.47 M/UL — SIGNIFICANT CHANGE UP (ref 4.2–5.8)
RBC # FLD: 12.3 % — SIGNIFICANT CHANGE UP (ref 10.3–14.5)
SODIUM SERPL-SCNC: 143 MMOL/L — SIGNIFICANT CHANGE UP (ref 135–145)
WBC # BLD: 7.47 K/UL — SIGNIFICANT CHANGE UP (ref 3.8–10.5)
WBC # FLD AUTO: 7.47 K/UL — SIGNIFICANT CHANGE UP (ref 3.8–10.5)

## 2017-09-18 PROCEDURE — 99233 SBSQ HOSP IP/OBS HIGH 50: CPT

## 2017-09-18 PROCEDURE — 93325 DOPPLER ECHO COLOR FLOW MAPG: CPT | Mod: 26,GC

## 2017-09-18 PROCEDURE — 99232 SBSQ HOSP IP/OBS MODERATE 35: CPT | Mod: GC

## 2017-09-18 PROCEDURE — 99223 1ST HOSP IP/OBS HIGH 75: CPT | Mod: GC

## 2017-09-18 PROCEDURE — 76376 3D RENDER W/INTRP POSTPROCES: CPT | Mod: 26

## 2017-09-18 PROCEDURE — 93320 DOPPLER ECHO COMPLETE: CPT | Mod: 26,GC

## 2017-09-18 PROCEDURE — 93312 ECHO TRANSESOPHAGEAL: CPT | Mod: 26

## 2017-09-18 RX ORDER — IPRATROPIUM/ALBUTEROL SULFATE 18-103MCG
3 AEROSOL WITH ADAPTER (GRAM) INHALATION EVERY 6 HOURS
Qty: 0 | Refills: 0 | Status: DISCONTINUED | OUTPATIENT
Start: 2017-09-18 | End: 2017-09-24

## 2017-09-18 RX ORDER — LOSARTAN POTASSIUM 100 MG/1
1 TABLET, FILM COATED ORAL
Qty: 0 | Refills: 0 | COMMUNITY

## 2017-09-18 RX ORDER — EPINEPHRINE 0.3 MG/.3ML
3 INJECTION INTRAMUSCULAR; SUBCUTANEOUS
Qty: 0 | Refills: 0 | COMMUNITY

## 2017-09-18 RX ADMIN — ENOXAPARIN SODIUM 40 MILLIGRAM(S): 100 INJECTION SUBCUTANEOUS at 12:20

## 2017-09-18 RX ADMIN — DIVALPROEX SODIUM 500 MILLIGRAM(S): 500 TABLET, DELAYED RELEASE ORAL at 05:09

## 2017-09-18 RX ADMIN — ATORVASTATIN CALCIUM 80 MILLIGRAM(S): 80 TABLET, FILM COATED ORAL at 22:52

## 2017-09-18 RX ADMIN — PANTOPRAZOLE SODIUM 40 MILLIGRAM(S): 20 TABLET, DELAYED RELEASE ORAL at 05:09

## 2017-09-18 RX ADMIN — Medication 3 MILLILITER(S): at 17:55

## 2017-09-18 RX ADMIN — DIVALPROEX SODIUM 500 MILLIGRAM(S): 500 TABLET, DELAYED RELEASE ORAL at 18:30

## 2017-09-18 RX ADMIN — BUDESONIDE AND FORMOTEROL FUMARATE DIHYDRATE 2 PUFF(S): 160; 4.5 AEROSOL RESPIRATORY (INHALATION) at 13:47

## 2017-09-18 RX ADMIN — Medication 1 MILLIGRAM(S): at 19:43

## 2017-09-18 RX ADMIN — Medication 81 MILLIGRAM(S): at 12:20

## 2017-09-18 RX ADMIN — CHLORHEXIDINE GLUCONATE 1 APPLICATION(S): 213 SOLUTION TOPICAL at 13:47

## 2017-09-18 RX ADMIN — Medication 3 MILLILITER(S): at 21:13

## 2017-09-18 NOTE — PROGRESS NOTE ADULT - SUBJECTIVE AND OBJECTIVE BOX
Patient is a 47y old  Male who presents with a chief complaint of "My leg wouldn't stop shaking." (14 Sep 2017 09:59)  he is doing ok  still with slight wheezing today    Any change in ROS:     MEDICATIONS  (STANDING):  atorvastatin 80 milliGRAM(s) Oral at bedtime  enoxaparin Injectable 40 milliGRAM(s) SubCutaneous every 24 hours  chlorhexidine 4% Liquid 1 Application(s) Topical daily  aspirin  chewable 81 milliGRAM(s) Oral daily  diVALproex  milliGRAM(s) Oral two times a day  buDESOnide  80 MICROgram(s)/formoterol 4.5 MICROgram(s) Inhaler 2 Puff(s) Inhalation two times a day  polyethylene glycol 3350 17 Gram(s) Oral at bedtime  pantoprazole    Tablet 40 milliGRAM(s) Oral before breakfast    MEDICATIONS  (PRN):  acetaminophen    Suspension 650 milliGRAM(s) Oral every 6 hours PRN For Temp greater than 38 C (100.4 F)  ALBUTerol/ipratropium for Nebulization 3 milliLiter(s) Nebulizer every 6 hours PRN Shortness of Breath and/or Wheezing  aluminum hydroxide/magnesium hydroxide/simethicone Suspension 30 milliLiter(s) Oral every 6 hours PRN Dyspepsia    Vital Signs Last 24 Hrs  T(C): 36.7 (18 Sep 2017 05:00), Max: 37 (17 Sep 2017 15:21)  T(F): 98 (18 Sep 2017 05:00), Max: 98.6 (17 Sep 2017 15:21)  HR: 75 (18 Sep 2017 05:00) (75 - 96)  BP: 111/64 (18 Sep 2017 05:00) (102/67 - 120/79)  BP(mean): --  RR: 16 (18 Sep 2017 05:00) (16 - 19)  SpO2: 99% (18 Sep 2017 05:00) (96% - 99%)    I&O's Summary        Physical Exam:   GENERAL: NAD, well-groomed, well-developed  HEENT: GIOVANI/   Atraumatic, Normocephalic  ENMT: No tonsillar erythema, exudates, or enlargement; Moist mucous membranes, Good dentition, No lesions  NECK: Supple, No JVD, Normal thyroid  CHEST/LUNG: Mild Wheezing  CVS: Regular rate and rhythm; No murmurs, rubs, or gallops  GI: : Soft, Nontender, Nondistended; Bowel sounds present  NERVOUS SYSTEM:  Alert & Oriented X3  EXTREMITIES:  2+ Peripheral Pulses, No clubbing, cyanosis, or edema  LYMPH: No lymphadenopathy noted  SKIN: No rashes or lesions  ENDOCRINOLOGY: No Thyromegaly  PSYCH: Appropriate    Labs:                              12.7   7.47  )-----------( 112      ( 18 Sep 2017 06:31 )             40.4                         13.1   5.97  )-----------( 115      ( 17 Sep 2017 06:35 )             41.0                         13.3   6.41  )-----------( 131      ( 16 Sep 2017 06:10 )             42.3                         14.0   7.68  )-----------( 143      ( 15 Sep 2017 14:45 )             44.2     09-18    143  |  100  |  20  ----------------------------<  82  3.7   |  31  |  1.02  09-17    141  |  98  |  20  ----------------------------<  91  3.9   |  34<H>  |  1.02  09-16    142  |  98  |  22  ----------------------------<  85  3.8   |  34<H>  |  1.05  09-15    141  |  97<L>  |  22  ----------------------------<  126<H>  4.2   |  29  |  1.04    Ca    8.9      18 Sep 2017 06:31  Ca    9.3      17 Sep 2017 06:35  Phos  2.3     09-17  Mg     1.9     09-18  Mg     2.0     09-17      CAPILLARY BLOOD GLUCOSE                Cultures:         Culture - Blood:   NO ORGANISMS ISOLATED (09.12.17 @ 22:47)    < from: MRI Head w/wo Cont (09.13.17 @ 16:16) >    The punctate foci of intracranial air on the prior head CT have grossly   resolved over the time interval.    I discussed the exam findings with Dr. Simons at 4:35 PM on 09/13/2017 with   read back.    IMPRESSION:    Areas of acute infarction involve the right greater than left frontal,   parietal, and occipital lobes, without hemorrhagic transformation.                  HECTOR WU M.D., ATTENDING RADIOLOGIST  This document has been electronically signed. Sep 13 2017  4:35PM    < end of copied text >                      Studies  Chest X-RAY  CT SCAN Chest   Venous Dopplers: LE:   CT Abdomen  Others

## 2017-09-18 NOTE — PROGRESS NOTE ADULT - PROBLEM SELECTOR PLAN 1
Repeat MRI brain with and without contrast as there are areas of enhancement in gyral pattern, suggestive of seizure activity  Loop recorder can be done as outpatient  ASA 81 mg PO QD  Lipitor 80 mg PO QHS  HbA1c  Tele monitor  PT/OT eval and consult Dr. Vannesa Bedoya (physiatry)  DVT prophylaxis with lovenox.

## 2017-09-18 NOTE — DIETITIAN INITIAL EVALUATION ADULT. - PERTINENT LABORATORY DATA
09-18 Na143 mmol/L Glu 82 mg/dL K+ 3.7 mmol/L Cr  1.02 mg/dL BUN 20 mg/dL Phos n/a   Alb n/a   PAB n/a

## 2017-09-18 NOTE — PROGRESS NOTE ADULT - PROBLEM SELECTOR PLAN 2
wheezing is better today: dc his steroids after today's dose:  today with mild wheezing: would change to duoneb ATC

## 2017-09-18 NOTE — PROGRESS NOTE ADULT - SUBJECTIVE AND OBJECTIVE BOX
Date of Admission:    24H hour events: No acute events overnight. He reports feeling well without any CP, SOB, palpitations, nausea/vomiting, or lightheadedness.     MEDICATIONS:  enoxaparin Injectable 40 milliGRAM(s) SubCutaneous every 24 hours  aspirin  chewable 81 milliGRAM(s) Oral daily    ALBUTerol/ipratropium for Nebulization 3 milliLiter(s) Nebulizer every 6 hours PRN  buDESOnide  80 MICROgram(s)/formoterol 4.5 MICROgram(s) Inhaler 2 Puff(s) Inhalation two times a day  ALBUTerol/ipratropium for Nebulization 3 milliLiter(s) Nebulizer every 6 hours    acetaminophen    Suspension 650 milliGRAM(s) Oral every 6 hours PRN  diVALproex  milliGRAM(s) Oral two times a day    polyethylene glycol 3350 17 Gram(s) Oral at bedtime  aluminum hydroxide/magnesium hydroxide/simethicone Suspension 30 milliLiter(s) Oral every 6 hours PRN  pantoprazole    Tablet 40 milliGRAM(s) Oral before breakfast    atorvastatin 80 milliGRAM(s) Oral at bedtime    chlorhexidine 4% Liquid 1 Application(s) Topical daily      REVIEW OF SYSTEMS:  Complete 10point ROS negative.    PHYSICAL EXAM:  T(C): 36.4 (09-18-17 @ 13:52), Max: 37 (09-17-17 @ 15:21)  HR: 77 (09-18-17 @ 13:52) (75 - 96)  BP: 116/78 (09-18-17 @ 13:52) (102/67 - 120/79)  RR: 18 (09-18-17 @ 13:52) (16 - 19)  SpO2: 99% (09-18-17 @ 13:52) (96% - 99%)  Wt(kg): --  I&O's Summary      Appearance: Well appearing, sitting up in bed eating  HEENT:   MMM OP clear  Cardiovascular: Normal S1 S2, No JVD, No murmurs, No edema  Respiratory: Lungs clear to auscultation	  Psychiatry:, Mood & affect appropriate  Gastrointestinal:  Soft, Non-tender, + BS	  Skin: No rashes, No ecchymoses, No cyanosis	  Extremities: No clubbing, cyanosis or edema  Vascular: Peripheral pulses palpable 2+ bilaterally        LABS:	 	    CBC Full  -  ( 18 Sep 2017 06:31 )  WBC Count : 7.47 K/uL  Hemoglobin : 12.7 g/dL  Hematocrit : 40.4 %  Platelet Count - Automated : 112 K/uL  Mean Cell Volume : 90.4 fL  Mean Cell Hemoglobin : 28.4 pg  Mean Cell Hemoglobin Concentration : 31.4 %      09-18    143  |  100  |  20  ----------------------------<  82  3.7   |  31  |  1.02  09-17    141  |  98  |  20  ----------------------------<  91  3.9   |  34<H>  |  1.02    Ca    8.9      18 Sep 2017 06:31  Ca    9.3      17 Sep 2017 06:35  Phos  2.3     09-17  Mg     1.9     09-18  Mg     2.0     09-17    TELEMETRY: 	  SR  (sinus tach)    PREVIOUS DIAGNOSTIC TESTING:    [ ] Echocardiogram: < from: JONATAN w/o TTE (w/3D Echo) (09.18.17 @ 17:45) >  CONCLUSIONS:  1. Normal mitral valve. Minimal mitral regurgitation.  2. Normal trileaflet aortic valve. No aortic valve  regurgitation seen.  3. Normal aortic root, aortic arch and descending thoracic  aorta.  4. Normal left atrium.  No left atrial or left atrial  appendage thrombus.  5. Normal left ventricular systolic function. No segmental  wall motion abnormalities.  6. Right ventricular enlargement with decreased right  ventricular systolic function.  7. Agitated saline injection and color flow Doppler  demonstrate evidence of a large patent foramen ovale. 	  	  ASSESSMENT/PLAN: 	    47M w/HTN, COPD, asthma, sarcoidosis with hx of pneumothoracies cardiology consulted for EKG changes course complicated by seizure activity in setting of R MCA and PCA infarcts now found to have large PFO on JONATAN w/normal LV function.     #Cryptogenic Stroke with large PFO seen on JONATAN 9/18. No LV thrombus seen on JONATAN. Tele with Sinus tach. No noted afib.   - Consider EP outpatient follow up for implantable loop recorder   - Will discuss role for PFO closure with Amplatzer device given recent trials suggesting reduced benefit in recurrent stroke rates.    - c/w ASA and lipitor     [id number]

## 2017-09-18 NOTE — CONSULT NOTE ADULT - ASSESSMENT
This is a 47 year old AAM with PMH significant for Sarcoidosis , HTN, COPD, Pneumothorax x 3 who presented with left leg spasms and contractions and was found to have new onset A.fib, PFO seen on JONATAN and multiple areas of infarction seen on MRI. Rheumatology consulted due to positive HELENE of 1:160 associated with no rashes, arthritis or uveitis.     1) Positive HELENE  - history of sarcoidosis, no other history of any autoimmune diseases  -denies arthritis, rashes, eye inflammation, blood clots in legs or lungs, red nodules on skin, morning stiffness, neck stiffness  -Physical: decreased strength over left side, no synovitis or joint tenderness, no rashes seen on skin or over shins( erythema nodosum)  -positive HELENE can be due to underlying viral, bacterial infections. 13 percent of people can have false positives in the population. If a family member has an autoimmune disease, another family member can incidentally have a positive HELENE with no symptomatology.  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 Tsang, agrees with above plan. This is a 47 year old AAM with PMH significant for Sarcoidosis , HTN, COPD, Pneumothorax x 3 who presented with left leg spasms and contractions and was found to have new onset A.fib, PFO seen on JONATAN and multiple areas of infarction seen on MRI. Rheumatology consulted due to positive HELENE of 1:160 associated with no rashes, arthritis or uveitis.     1) Positive HELENE  - history of sarcoidosis, no other history of any autoimmune diseases  -denies arthritis, rashes, eye inflammation, blood clots in legs or lungs, red nodules on skin, morning stiffness, neck stiffness  -Physical: decreased strength over left side, no synovitis or joint tenderness, no rashes seen on skin or over shins( erythema nodosum)  -positive HELENE can be due to underlying viral, bacterial infections. 13 percent of people can have false positives in the population. If a family member has an autoimmune disease, another family member can incidentally have a positive HELENE with no symptomatology.  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     Discussed with Dr. Tsang, agrees with above plan. This is a 47 year old AAM with PMH significant for Sarcoidosis , HTN, COPD, Pneumothorax x 3 who presented with left leg spasms and contractions and was found to have new onset A.fib, PFO seen on JONATAN and multiple areas of infarction seen on MRI. Rheumatology consulted due to positive HELENE of 1:160 associated with no rashes, arthritis or uveitis.     1) Positive HELENE  - history of sarcoidosis, no other history of any autoimmune diseases  -denies arthritis, rashes, eye inflammation, blood clots in legs or lungs, red nodules on skin, morning stiffness, neck stiffness  -Physical: decreased strength over left side, no synovitis or joint tenderness, no rashes seen on skin or over shins( erythema nodosum)  -positive HELENE can be due to underlying viral, bacterial infections. 13 percent of people can have false positives in the population. If a family member has an autoimmune disease, another family member can incidentally have a positive HELENE with no symptomatology.  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    Discussed with Dr. Tsang, agrees with above plan.

## 2017-09-18 NOTE — PROGRESS NOTE ADULT - PROBLEM SELECTOR PLAN 1
1. PT- bed mobility,transfers, gait and balance training  2. OT- ADL'S  3. CVA-Asa/Lipitor   4. Patient would benefit from acute rehab, needs a multidisciplinary team including PT, OT and speech. Can tolerate 3 hours of therapy a day.  Will follow.

## 2017-09-18 NOTE — PROGRESS NOTE ADULT - PROBLEM SELECTOR PLAN 5
successfully extubated:  pt is already on steroids  will dc after tomorrows dose: he was not on any treatment for sarcoidosis: he will follow up with his pulmonologist following dc

## 2017-09-18 NOTE — PROGRESS NOTE ADULT - SUBJECTIVE AND OBJECTIVE BOX
HPI:  Patient is a 47 year old male with a PMHx of sarcoidosis, HTN, COPD, and asthma who presents to the ED with severe leg spasms. These intermittent left leg spasms began suddenly two days ago in the patient's home and have become more regular and worsening in contracture. The spasm began in the quad leading to severe erratic left leg movements that can last from seconds to minutes. Once the leg spasm and contracture stops, an "intense tingling and pain sensation" which starts in the foot and radiates up the left side of his body to his neck. Once the tingling sensation reached his neck, the patient became faint, but did not fully lose consciousness. The patient also had a HA at the time of the spasm and tingling which was diffuse throughout the head and rated a 10/10 on a pain scale. On admission pt found to have moderate inspiratory and expiratory wheezes. Initial CT head negative. Treated for COPD exacerbation. RRT on tele floor for CVA vs. seizure activity. AMS with obtundation, intubated for airway and transfered to MICU. On Propofol/Neosynephrine. Seen by neuro with recs for MRI, EEG 24 hr w/ video, and valproic acid load. Concern for new fat emboli seen on CT head- however no identifable source. 9/13: MRI-  bilateral (R>L) MCA and right PCA infarcts in the frontoparietal and occipital areas which could be cardioembolic in source. TTE- positive bubble study  Improvement in LLE     REVIEW OF SYSTEMS: No chest pain, shortness of breath, nausea, vomiting or diarhea.      CURRENT FUNCTIONAL STATUS: mod a     MEDICATIONS  (STANDING):  atorvastatin 80 milliGRAM(s) Oral at bedtime  enoxaparin Injectable 40 milliGRAM(s) SubCutaneous every 24 hours  chlorhexidine 4% Liquid 1 Application(s) Topical daily  aspirin  chewable 81 milliGRAM(s) Oral daily  diVALproex  milliGRAM(s) Oral two times a day  buDESOnide  80 MICROgram(s)/formoterol 4.5 MICROgram(s) Inhaler 2 Puff(s) Inhalation two times a day  polyethylene glycol 3350 17 Gram(s) Oral at bedtime  pantoprazole    Tablet 40 milliGRAM(s) Oral before breakfast  ALBUTerol/ipratropium for Nebulization 3 milliLiter(s) Nebulizer every 6 hours      Vital Signs Last 24 Hrs  T(C): 36.4 (18 Sep 2017 13:52), Max: 36.9 (17 Sep 2017 22:05)  T(F): 97.6 (18 Sep 2017 13:52), Max: 98.4 (17 Sep 2017 22:05)  HR: 77 (18 Sep 2017 13:52) (75 - 89)  BP: 116/78 (18 Sep 2017 13:52) (111/64 - 120/79)  BP(mean): --  RR: 18 (18 Sep 2017 13:52) (16 - 19)  SpO2: 99% (18 Sep 2017 13:52) (99% - 99%)              ----------------------------------------------------------------------------------------  PHYSICAL EXAM  Constitutional - NAD, Comfortable  HEENT - NCAT, EOMI  Neck - Supple, No limited ROM  Chest - CTA bilaterally, No wheeze, No rhonchi, No crackles  Cardiovascular - RRR, S1S2, No murmurs  Abdomen - BS+, Soft, NTND  Extremities - No C/C/E, No calf tenderness   Neurologic Exam -                    Cognitive - Awake, Alert, AAO to self, place, date, year, situation     Communication - Fluent, No dysarthria, no aphasia     Cranial Nerves - CN 2-12 intact     Motor - LUE 0/5, LLE 4-/5, RUE/RLE 5/5                       Sensory - Intact to LT     Reflexes - DTR Intact, No primitive reflexive     Balance - poor   Psychiatric - Mood stable, Affect WNL

## 2017-09-18 NOTE — PROGRESS NOTE ADULT - ASSESSMENT
47 year old -American male initially presenting with LLE spasm. Hospital course complicated by an episode of unresponsiveness and gaze deviation concerning for seizure vs stroke. He was subsequently intubated for airway protection and extubated. Post extubation, mental status improved, though exam notable for L-sided weakness. EEG showed burst suppression pattern while on sedation.    MRI brain showed bilateral (R>L) MCA and right PCA infarcts in the frontoparietal and occipital areas which could be cardioembolic in source.  MRA head/neck is grossly unremarkable although limited by motion.  LE doppler negative for DVT bilaterally  JONATAN showed large PFO.

## 2017-09-18 NOTE — PROGRESS NOTE ADULT - PROBLEM SELECTOR PLAN 5
-likely excerbated by URI  on prednisone 40 qd, stop after tomorrow dose.  pulm rec appreciated. -likely excerbated by URI  -prednisone d/c'ed Feliz ATC  pulm rec appreciated.

## 2017-09-18 NOTE — DIETITIAN INITIAL EVALUATION ADULT. - PROBLEM SELECTOR PLAN 2
Continuous SpO2 monitoring  DuoNebs PRN dyspnea/wheezing  Continue Symbicort, and Incruse Ellipta as previously directed.  Continue Azithromycin as previously prescribed by the ED for the acute bronchitis.   Start on prednisone 40mg po daily  consider pulmonary consult

## 2017-09-18 NOTE — PROGRESS NOTE ADULT - PROBLEM SELECTOR PLAN 1
bilateral MCA and right PCA infarcts (R>L) in the frontoparietal and occipital areas which could be cardioembolic in source. f/u JONATAN  -case d/w Neurology repeat MRI   ASA 81 mg PO QD  Lipitor 80 mg PO QHS  check HbA1c  Tele monitor  PT/OT eval-PMR acute rehab  follow up neuro rec bilateral MCA and right PCA infarcts (R>L) in the frontoparietal and occipital areas which could be cardioembolic in source.  JONATAN-+PFO will d/w neuro and cardiology Torrez in regards to PFO closure.  -case d/w Neurology repeat MRI   ASA 81 mg PO QD  Lipitor 80 mg PO QHS   EoI0d-8.8 LDL-113  Tele monitor  PT/OT eval-PMR acute rehab

## 2017-09-18 NOTE — DIETITIAN INITIAL EVALUATION ADULT. - NS AS NUTRI INTERV ED CONTENT
Purpose of the nutrition education/Survival information/Nutrition relationship to health/disease/Recommended modifications/Priority modifications

## 2017-09-18 NOTE — CONSULT NOTE ADULT - SUBJECTIVE AND OBJECTIVE BOX
OSCAR GINNA  [id number]    HISTORY OF PRESENT ILLNESS:    PAST MEDICAL & SURGICAL HISTORY:  History of pneumothorax: History of 3 prior pneumonthoracies.  COPD (chronic obstructive pulmonary disease)  Asthma  Hypertension  Sarcoidosis  No significant past surgical history      Review of Systems:  Gen:  No fevers/chills, weight loss  HEENT: No blurry vision, no difficulty swallowing  CVS: No chest pain/palpitations  Resp: No SOB/wheezing  GI: No N/V/C/D/abdominal pain  MSK:  Skin: No new rashes  Neuro: No headaches    MEDICATIONS  (STANDING):  atorvastatin 80 milliGRAM(s) Oral at bedtime  enoxaparin Injectable 40 milliGRAM(s) SubCutaneous every 24 hours  chlorhexidine 4% Liquid 1 Application(s) Topical daily  aspirin  chewable 81 milliGRAM(s) Oral daily  diVALproex  milliGRAM(s) Oral two times a day  buDESOnide  80 MICROgram(s)/formoterol 4.5 MICROgram(s) Inhaler 2 Puff(s) Inhalation two times a day  polyethylene glycol 3350 17 Gram(s) Oral at bedtime  pantoprazole    Tablet 40 milliGRAM(s) Oral before breakfast  ALBUTerol/ipratropium for Nebulization 3 milliLiter(s) Nebulizer every 6 hours    MEDICATIONS  (PRN):  acetaminophen    Suspension 650 milliGRAM(s) Oral every 6 hours PRN For Temp greater than 38 C (100.4 F)  ALBUTerol/ipratropium for Nebulization 3 milliLiter(s) Nebulizer every 6 hours PRN Shortness of Breath and/or Wheezing  aluminum hydroxide/magnesium hydroxide/simethicone Suspension 30 milliLiter(s) Oral every 6 hours PRN Dyspepsia      Allergies    No Known Allergies    Intolerances        PERTINENT MEDICATION HISTORY:    SOCIAL HISTORY:  OCCUPATION:  TRAVEL HISTORY:    FAMILY HISTORY:  Family history of pancreatic cancer (Father)  Family history of essential hypertension (Father)      Vital Signs Last 24 Hrs  T(C): 36.4 (18 Sep 2017 13:52), Max: 36.9 (17 Sep 2017 22:05)  T(F): 97.6 (18 Sep 2017 13:52), Max: 98.4 (17 Sep 2017 22:05)  HR: 77 (18 Sep 2017 13:52) (75 - 89)  BP: 116/78 (18 Sep 2017 13:52) (111/64 - 120/79)  BP(mean): --  RR: 18 (18 Sep 2017 13:52) (16 - 19)  SpO2: 99% (18 Sep 2017 13:52) (99% - 99%)    Daily     Daily Weight in k (18 Sep 2017 13:00)    Physical Exam:  General: No apparent distress  HEENT: EOMI, MMM  CVS: +S1/S2, RRR, no murmurs/rubs/gallops  Resp: CTA b/l. No crackles/wheezing  GI: Soft, NT/ND +BS  MSK:  Shoulders: wnl  Elbows: wnl  Wrists: wnl  MCPs: wnl  PIPs: wnl  DIPs: wnl   Hips: wnl  Knees: wnl   Ankle: wnl  Neuro: AAOx3  Skin: no visible rashes    LABS:                        12.7   7.47  )-----------( 112      ( 18 Sep 2017 06:31 )             40.4     09-18    143  |  100  |  20  ----------------------------<  82  3.7   |  31  |  1.02    Ca    8.9      18 Sep 2017 06:31  Phos  2.3     09-17  Mg     1.9     09-18            RADIOLOGY & ADDITIONAL STUDIES: OSCAR GINNA  [id number]    HISTORY OF PRESENT ILLNESS:  Pt is a 47 year old AAM with a PMH significant for Sarcoidosis, HTN. COPD, Pneumothorax x3 times treated with chest tube who comes in for severe leg spasms associated with an intense tingling sensation that radiated up to the neck which led patient to feel faint with no syncope. Patient was also found to be wheezing and initial CT head, neck CTA head were all negative. Patient was found to have positive Rhinovirus. CTA neck showed severe scarring and bronchiectasis in the lung apices with prominent right paratracheal LN. Patient was then a rapid response/CODE STROKE for facial droop in association with possible seizure with urinary incontinence and was intubated due to unresponsiveness. EEG showed diffuse cerebral dysfunction and MRI of the brain showed areas of acute infarction involving the right more than the left without hemorrhagic conversion in association with new onset A.fib. JONATAN showed PFO. Patient had weakness on his left side and was started on proper medication. Patient was a  responder and was diagnosed in  by lung biopsy as patient was having SOB. He stated that he had a lot of scar tissue. Patient denies any extrapulmonary sarcoidosis as well as arthritis, eye pain, nodules on skin, morning stiffness, rashes, blood clots in lungs or legs, history of malignancies. Rheumatology consulted due to incidental finding of positive HELENE 1:160.     PAST MEDICAL & SURGICAL HISTORY:  History of pneumothorax: History of 3 prior pneumonthoracies.  COPD (chronic obstructive pulmonary disease)  Asthma  Hypertension  Sarcoidosis  No significant past surgical history except for lung biopsy      Review of Systems:  Gen:  No fevers/chills, weight loss  HEENT: No blurry vision, no difficulty swallowing  CVS: No chest pain/palpitations  Resp: No SOB/wheezing  GI: No N/V/C/D/abdominal pain  MSK:  Skin: No new rashes  Neuro: No headaches, weakness left side    MEDICATIONS  (STANDING):  atorvastatin 80 milliGRAM(s) Oral at bedtime  enoxaparin Injectable 40 milliGRAM(s) SubCutaneous every 24 hours  chlorhexidine 4% Liquid 1 Application(s) Topical daily  aspirin  chewable 81 milliGRAM(s) Oral daily  diVALproex  milliGRAM(s) Oral two times a day  buDESOnide  80 MICROgram(s)/formoterol 4.5 MICROgram(s) Inhaler 2 Puff(s) Inhalation two times a day  polyethylene glycol 3350 17 Gram(s) Oral at bedtime  pantoprazole    Tablet 40 milliGRAM(s) Oral before breakfast  ALBUTerol/ipratropium for Nebulization 3 milliLiter(s) Nebulizer every 6 hours    MEDICATIONS  (PRN):  acetaminophen    Suspension 650 milliGRAM(s) Oral every 6 hours PRN For Temp greater than 38 C (100.4 F)  ALBUTerol/ipratropium for Nebulization 3 milliLiter(s) Nebulizer every 6 hours PRN Shortness of Breath and/or Wheezing  aluminum hydroxide/magnesium hydroxide/simethicone Suspension 30 milliLiter(s) Oral every 6 hours PRN Dyspepsia      Allergies    No Known Allergies    Intolerances        SOCIAL HISTORY: denies ETOH, cigarettes and illicit drug use  TRAVEL HISTORY: none    FAMILY HISTORY:  Family history of pancreatic cancer (Father)  Family history of essential hypertension (Father)      Vital Signs Last 24 Hrs  T(C): 36.4 (18 Sep 2017 13:52), Max: 36.9 (17 Sep 2017 22:05)  T(F): 97.6 (18 Sep 2017 13:52), Max: 98.4 (17 Sep 2017 22:05)  HR: 77 (18 Sep 2017 13:52) (75 - 89)  BP: 116/78 (18 Sep 2017 13:52) (111/64 - 120/79)  BP(mean): --  RR: 18 (18 Sep 2017 13:52) (16 - 19)  SpO2: 99% (18 Sep 2017 13:52) (99% - 99%)    Daily     Daily Weight in k (18 Sep 2017 13:00)    Physical Exam:  General: No apparent distress, surrounded by family at bedside. AAOX3 but slightly lethargic  HEENT: EOMI, MMM  CVS: +S1/S2, RRR, no murmurs/rubs/gallops  Resp: CTA b/l. loud inspiratory wheezing   GI: Soft, NT/ND +BS  MSK: decreased strength on left side, 5/5 strength right UE and LE.   Shoulders: wnl  Elbows: wnl  Wrists: wnl  MCPs: wnl  PIPs: wnl  DIPs: wnl   Hips: wnl  Knees: wnl   Ankle: wnl  Neuro: AAOx3  Skin: no visible rashes    LABS:                        12.7   7.47  )-----------( 112      ( 18 Sep 2017 06:31 )             40.4     -    143  |  100  |  20  ----------------------------<  82  3.7   |  31  |  1.02    Ca    8.9      18 Sep 2017 06:31  Phos  2.3     -  Mg     1.9     18    Anti Nuclear Factor Titer: 1:160 (17 @ 11:30)    Cytoplasmic (c-ANCA) Antibody: Negative (17 @ 11:30)  Perinuclear (p-ANCA) Antibody: Negative (17 @ 11:30)          RADIOLOGY & ADDITIONAL STUDIES:    < from: MRI Head w/wo Cont (17 @ 16:16) >    Areas of acute infarction involve the right greater than left frontal,   parietal, and occipital lobes, without hemorrhagic transformation. OSCAR GINNA  [id number]    HISTORY OF PRESENT ILLNESS:  Pt is a 47 year old AAM with a PMH significant for Sarcoidosis, HTN. COPD, Pneumothorax x3 times treated with chest tube who comes in for severe leg spasms associated with an intense tingling sensation that radiated up to the neck which led patient to feel faint with no syncope. Patient was also found to be wheezing and initial CT head, neck CTA head were all negative. Patient was found to have positive Rhinovirus. CTA neck showed severe scarring and bronchiectasis in the lung apices with prominent right paratracheal LN. Patient was then a rapid response/CODE STROKE for facial droop in association with possible seizure with urinary incontinence and was intubated due to unresponsiveness. EEG showed diffuse cerebral dysfunction and MRI of the brain showed areas of acute infarction involving the right more than the left without hemorrhagic conversion. JONATAN showed PFO. Patient had weakness on his left side and was started on proper medication. Patient was a  responder and was diagnosed in  by lung biopsy as patient was having SOB. He stated that he had a lot of scar tissue. Patient denies any extrapulmonary sarcoidosis as well as arthritis, eye pain, nodules on skin, morning stiffness, rashes, blood clots in lungs or legs, history of malignancies. Rheumatology consulted due to incidental finding of positive HELENE 1:160.     PAST MEDICAL & SURGICAL HISTORY:  History of pneumothorax: History of 3 prior pneumonthoracies.  COPD (chronic obstructive pulmonary disease)  Asthma  Hypertension  Sarcoidosis  No significant past surgical history except for lung biopsy      Review of Systems:  Gen:  No fevers/chills, weight loss  HEENT: No blurry vision, no difficulty swallowing  CVS: No chest pain/palpitations  Resp: No SOB/wheezing  GI: No N/V/C/D/abdominal pain  MSK:  Skin: No new rashes  Neuro: No headaches, weakness left side    MEDICATIONS  (STANDING):  atorvastatin 80 milliGRAM(s) Oral at bedtime  enoxaparin Injectable 40 milliGRAM(s) SubCutaneous every 24 hours  chlorhexidine 4% Liquid 1 Application(s) Topical daily  aspirin  chewable 81 milliGRAM(s) Oral daily  diVALproex  milliGRAM(s) Oral two times a day  buDESOnide  80 MICROgram(s)/formoterol 4.5 MICROgram(s) Inhaler 2 Puff(s) Inhalation two times a day  polyethylene glycol 3350 17 Gram(s) Oral at bedtime  pantoprazole    Tablet 40 milliGRAM(s) Oral before breakfast  ALBUTerol/ipratropium for Nebulization 3 milliLiter(s) Nebulizer every 6 hours    MEDICATIONS  (PRN):  acetaminophen    Suspension 650 milliGRAM(s) Oral every 6 hours PRN For Temp greater than 38 C (100.4 F)  ALBUTerol/ipratropium for Nebulization 3 milliLiter(s) Nebulizer every 6 hours PRN Shortness of Breath and/or Wheezing  aluminum hydroxide/magnesium hydroxide/simethicone Suspension 30 milliLiter(s) Oral every 6 hours PRN Dyspepsia      Allergies    No Known Allergies    Intolerances        SOCIAL HISTORY: denies ETOH, cigarettes and illicit drug use  TRAVEL HISTORY: none    FAMILY HISTORY:  Family history of pancreatic cancer (Father)  Family history of essential hypertension (Father)      Vital Signs Last 24 Hrs  T(C): 36.4 (18 Sep 2017 13:52), Max: 36.9 (17 Sep 2017 22:05)  T(F): 97.6 (18 Sep 2017 13:52), Max: 98.4 (17 Sep 2017 22:05)  HR: 77 (18 Sep 2017 13:52) (75 - 89)  BP: 116/78 (18 Sep 2017 13:52) (111/64 - 120/79)  BP(mean): --  RR: 18 (18 Sep 2017 13:52) (16 - 19)  SpO2: 99% (18 Sep 2017 13:52) (99% - 99%)    Daily     Daily Weight in k (18 Sep 2017 13:00)    Physical Exam:  General: No apparent distress, surrounded by family at bedside. AAOX3 but slightly lethargic  HEENT: EOMI, MMM  CVS: +S1/S2, RRR, no murmurs/rubs/gallops  Resp: CTA b/l. loud inspiratory wheezing   GI: Soft, NT/ND +BS  MSK: decreased strength on left side, 5/5 strength right UE and LE.   Shoulders: wnl  Elbows: wnl  Wrists: wnl  MCPs: wnl  PIPs: wnl  DIPs: wnl   Hips: wnl  Knees: wnl   Ankle: wnl  Neuro: AAOx3  Skin: no visible rashes    LABS:                        12.7   7.47  )-----------( 112      ( 18 Sep 2017 06:31 )             40.4         143  |  100  |  20  ----------------------------<  82  3.7   |  31  |  1.02    Ca    8.9      18 Sep 2017 06:31  Phos  2.3       Mg     1.9         Anti Nuclear Factor Titer: 1:160 (17 @ 11:30)    Cytoplasmic (c-ANCA) Antibody: Negative (17 @ 11:30)  Perinuclear (p-ANCA) Antibody: Negative (17 @ 11:30)          RADIOLOGY & ADDITIONAL STUDIES:    < from: MRI Head w/wo Cont (17 @ 16:16) >    Areas of acute infarction involve the right greater than left frontal,   parietal, and occipital lobes, without hemorrhagic transformation.

## 2017-09-18 NOTE — PROGRESS NOTE ADULT - PROBLEM SELECTOR PLAN 4
TTE showed reduced LV function.   follow up cardiology -JONATAN normal LVEF no WMA no ZACKERY thrombus  -case d/w pharmacy to verify meds

## 2017-09-18 NOTE — PROGRESS NOTE ADULT - SUBJECTIVE AND OBJECTIVE BOX
Patient is a 47y old  Male who presents with a chief complaint of "My leg wouldn't stop shaking." (14 Sep 2017 09:59)      SUBJECTIVE / OVERNIGHT EVENTS: Pt in NAD s/p JONATAN. Tele NSR    MEDICATIONS  (STANDING):  atorvastatin 80 milliGRAM(s) Oral at bedtime  enoxaparin Injectable 40 milliGRAM(s) SubCutaneous every 24 hours  chlorhexidine 4% Liquid 1 Application(s) Topical daily  aspirin  chewable 81 milliGRAM(s) Oral daily  diVALproex  milliGRAM(s) Oral two times a day  buDESOnide  80 MICROgram(s)/formoterol 4.5 MICROgram(s) Inhaler 2 Puff(s) Inhalation two times a day  polyethylene glycol 3350 17 Gram(s) Oral at bedtime  pantoprazole    Tablet 40 milliGRAM(s) Oral before breakfast  ALBUTerol/ipratropium for Nebulization 3 milliLiter(s) Nebulizer every 6 hours    MEDICATIONS  (PRN):  acetaminophen    Suspension 650 milliGRAM(s) Oral every 6 hours PRN For Temp greater than 38 C (100.4 F)  ALBUTerol/ipratropium for Nebulization 3 milliLiter(s) Nebulizer every 6 hours PRN Shortness of Breath and/or Wheezing  aluminum hydroxide/magnesium hydroxide/simethicone Suspension 30 milliLiter(s) Oral every 6 hours PRN Dyspepsia        CAPILLARY BLOOD GLUCOSE        I&O's Summary      T(C): 36.7 (09-18-17 @ 05:00), Max: 37 (09-17-17 @ 15:21)  HR: 75 (09-18-17 @ 05:00) (75 - 96)  BP: 111/64 (09-18-17 @ 05:00) (102/67 - 120/79)  RR: 16 (09-18-17 @ 05:00) (16 - 19)  SpO2: 99% (09-18-17 @ 05:00) (96% - 99%)    PHYSICAL EXAM:  GENERAL: NAD, well-developed  HEAD:  Atraumatic, Normocephalic  EYES: EOMI, PERRLA, conjunctiva and sclera clear  NECK: Supple, No JVD  CHEST/LUNG: +wheeze  HEART: Regular rate and rhythm; No murmurs, rubs, or gallops  ABDOMEN: Soft, Nontender, Nondistended; Bowel sounds present  EXTREMITIES:  2+ Peripheral Pulses, No clubbing, cyanosis, or edema  PSYCH: AAOx3  NEUROLOGY: LT UE 3/5  SKIN: No rashes or lesions    LABS:                        12.7   7.47  )-----------( 112      ( 18 Sep 2017 06:31 )             40.4     09-18    143  |  100  |  20  ----------------------------<  82  3.7   |  31  |  1.02    Ca    8.9      18 Sep 2017 06:31  Phos  2.3     09-17  Mg     1.9     09-18                RADIOLOGY & ADDITIONAL TESTS:    Imaging Personally Reviewed:< from: JONATAN w/o TTE (w/3D Echo) (09.18.17 @ 17:45) >  1. Normal mitral valve. Minimal mitral regurgitation.  2. Normal trileaflet aortic valve. No aortic valve  regurgitation seen.  3. Normal aortic root, aortic arch and descending thoracic  aorta.  4. Normal left atrium.  No left atrial or left atrial  appendage thrombus.  5. Normal left ventricular systolic function. No segmental  wall motion abnormalities.  6. Right ventricular enlargement with decreased right  ventricular systolic function.  7. Agitated saline injection and color flow Doppler  demonstrate evidence of a large patent foramen ovale.    < end of copied text >      Consultant(s) Notes Reviewed:      Care Discussed with Consultants/Other Providers: Patient is a 47y old  Male who presents with a chief complaint of "My leg wouldn't stop shaking." (14 Sep 2017 09:59)      SUBJECTIVE / OVERNIGHT EVENTS: Pt in NAD s/p JONATAN. Tele NSR all other review of systems neg    MEDICATIONS  (STANDING):  atorvastatin 80 milliGRAM(s) Oral at bedtime  enoxaparin Injectable 40 milliGRAM(s) SubCutaneous every 24 hours  chlorhexidine 4% Liquid 1 Application(s) Topical daily  aspirin  chewable 81 milliGRAM(s) Oral daily  diVALproex  milliGRAM(s) Oral two times a day  buDESOnide  80 MICROgram(s)/formoterol 4.5 MICROgram(s) Inhaler 2 Puff(s) Inhalation two times a day  polyethylene glycol 3350 17 Gram(s) Oral at bedtime  pantoprazole    Tablet 40 milliGRAM(s) Oral before breakfast  ALBUTerol/ipratropium for Nebulization 3 milliLiter(s) Nebulizer every 6 hours    MEDICATIONS  (PRN):  acetaminophen    Suspension 650 milliGRAM(s) Oral every 6 hours PRN For Temp greater than 38 C (100.4 F)  ALBUTerol/ipratropium for Nebulization 3 milliLiter(s) Nebulizer every 6 hours PRN Shortness of Breath and/or Wheezing  aluminum hydroxide/magnesium hydroxide/simethicone Suspension 30 milliLiter(s) Oral every 6 hours PRN Dyspepsia        CAPILLARY BLOOD GLUCOSE        I&O's Summary      T(C): 36.7 (09-18-17 @ 05:00), Max: 37 (09-17-17 @ 15:21)  HR: 75 (09-18-17 @ 05:00) (75 - 96)  BP: 111/64 (09-18-17 @ 05:00) (102/67 - 120/79)  RR: 16 (09-18-17 @ 05:00) (16 - 19)  SpO2: 99% (09-18-17 @ 05:00) (96% - 99%)    PHYSICAL EXAM:  GENERAL: NAD, well-developed  HEAD:  Atraumatic, Normocephalic  EYES: EOMI, PERRLA, conjunctiva and sclera clear  NECK: Supple, No JVD  CHEST/LUNG: +wheeze  HEART: Regular rate and rhythm; No murmurs, rubs, or gallops  ABDOMEN: Soft, Nontender, Nondistended; Bowel sounds present  EXTREMITIES:  2+ Peripheral Pulses, No clubbing, cyanosis, or edema  PSYCH: AAOx3  NEUROLOGY: LT UE 3/5  SKIN: No rashes or lesions    LABS:                        12.7   7.47  )-----------( 112      ( 18 Sep 2017 06:31 )             40.4     09-18    143  |  100  |  20  ----------------------------<  82  3.7   |  31  |  1.02    Ca    8.9      18 Sep 2017 06:31  Phos  2.3     09-17  Mg     1.9     09-18                RADIOLOGY & ADDITIONAL TESTS:    Imaging Personally Reviewed:< from: JONATAN w/o TTE (w/3D Echo) (09.18.17 @ 17:45) >  1. Normal mitral valve. Minimal mitral regurgitation.  2. Normal trileaflet aortic valve. No aortic valve  regurgitation seen.  3. Normal aortic root, aortic arch and descending thoracic  aorta.  4. Normal left atrium.  No left atrial or left atrial  appendage thrombus.  5. Normal left ventricular systolic function. No segmental  wall motion abnormalities.  6. Right ventricular enlargement with decreased right  ventricular systolic function.  7. Agitated saline injection and color flow Doppler  demonstrate evidence of a large patent foramen ovale.    < end of copied text >      Consultant(s) Notes Reviewed:      Care Discussed with Consultants/Other Providers:

## 2017-09-18 NOTE — PROGRESS NOTE ADULT - PROBLEM SELECTOR PLAN 2
WBC normal currently, d/c abx. +HELENE rheumatology consult WBC normal currently, d/c abx. URI vs +HELENE rheumatology consult- given CVA WBC normal currently, d/c abx. URI vs +HELENE rheumatology consult- given CVA. cx neg till date

## 2017-09-18 NOTE — DIETITIAN INITIAL EVALUATION ADULT. - PROBLEM SELECTOR PLAN 1
tele monitor   cardiac enzymes x 3 neg  Orthostatic blood pressures  DASH   House cards constulted  continue ASA, plavix  started on Lipitor 80mg po qhs

## 2017-09-18 NOTE — PROGRESS NOTE ADULT - SUBJECTIVE AND OBJECTIVE BOX
Subjective: Interval History - No events overnight    Objective:   Vital Signs Last 24 Hrs  T(C): 36.7 (18 Sep 2017 05:00), Max: 37 (17 Sep 2017 15:21)  T(F): 98 (18 Sep 2017 05:00), Max: 98.6 (17 Sep 2017 15:21)  HR: 75 (18 Sep 2017 05:00) (75 - 96)  BP: 111/64 (18 Sep 2017 05:00) (102/67 - 120/79)  RR: 16 (18 Sep 2017 05:00) (16 - 19)  SpO2: 99% (18 Sep 2017 05:00) (96% - 99%)    General Exam:   General appearance: No acute distress                   Neurological Exam:  Mental Status: Eyes open spontaneously, alert. Orientated to self, age, date and place.  Attention intact.  No dysarthria, aphasia.     Cranial Nerves: EOMI, VFF. No facial asymmetry. Tongue midline    Motor:   Drift:  LUE no movement RUE no  LLE antigravity RLE no    Strength:   LUE 1/5  R antigravity  L iliopsoas 3-/5 anterior tibialis 3/5  R iliopsoas 5/5 anterior tibialis 5/5    Tone: normal            Sensation: intact to light touch, no extinction    Other:  09-18    143  |  100  |  20  ----------------------------<  82  3.7   |  31  |  1.02    Ca    8.9      18 Sep 2017 06:31  Phos  2.3     09-17  Mg     1.9     09-18               12.7   7.47  )-----------( 112      ( 18 Sep 2017 06:31 )             40.4     MEDICATIONS  (STANDING):  atorvastatin 80 milliGRAM(s) Oral at bedtime  enoxaparin Injectable 40 milliGRAM(s) SubCutaneous every 24 hours  chlorhexidine 4% Liquid 1 Application(s) Topical daily  aspirin  chewable 81 milliGRAM(s) Oral daily  diVALproex  milliGRAM(s) Oral two times a day  buDESOnide  80 MICROgram(s)/formoterol 4.5 MICROgram(s) Inhaler 2 Puff(s) Inhalation two times a day  polyethylene glycol 3350 17 Gram(s) Oral at bedtime  pantoprazole    Tablet 40 milliGRAM(s) Oral before breakfast  ALBUTerol/ipratropium for Nebulization 3 milliLiter(s) Nebulizer every 6 hours    MEDICATIONS  (PRN):  acetaminophen    Suspension 650 milliGRAM(s) Oral every 6 hours PRN For Temp greater than 38 C (100.4 F)  ALBUTerol/ipratropium for Nebulization 3 milliLiter(s) Nebulizer every 6 hours PRN Shortness of Breath and/or Wheezing  aluminum hydroxide/magnesium hydroxide/simethicone Suspension 30 milliLiter(s) Oral every 6 hours PRN Dyspepsia Subjective: Interval History - No events overnight  Reports better movement of left sided extremities    Objective:   Vital Signs Last 24 Hrs  T(C): 36.7 (18 Sep 2017 05:00), Max: 37 (17 Sep 2017 15:21)  T(F): 98 (18 Sep 2017 05:00), Max: 98.6 (17 Sep 2017 15:21)  HR: 75 (18 Sep 2017 05:00) (75 - 96)  BP: 111/64 (18 Sep 2017 05:00) (102/67 - 120/79)  RR: 16 (18 Sep 2017 05:00) (16 - 19)  SpO2: 99% (18 Sep 2017 05:00) (96% - 99%)    General Exam:   General appearance: No acute distress                   Neurological Exam:  Mental Status: Awake and alert, Orientated to self, age, date and place.  Attention intact.  No dysarthria, aphasia.     Cranial Nerves: EOMI, VFF. No facial asymmetry. Tongue midline    Motor:   Drift:  LUE present RUE no  LLE no RLE no    Strength:   LUE 2/5 (able to bend fully at elbow), 0/5 hand    R hand  5/5  L iliopsoas 3/5 anterior tibialis 3/5  R iliopsoas 5/5 anterior tibialis 5/5    Tone: normal            Sensation: intact to light touch, no extinction    Other:  09-18    143  |  100  |  20  ----------------------------<  82  3.7   |  31  |  1.02    Ca    8.9      18 Sep 2017 06:31  Phos  2.3     09-17  Mg     1.9     09-18               12.7   7.47  )-----------( 112      ( 18 Sep 2017 06:31 )             40.4     MEDICATIONS  (STANDING):  atorvastatin 80 milliGRAM(s) Oral at bedtime  enoxaparin Injectable 40 milliGRAM(s) SubCutaneous every 24 hours  chlorhexidine 4% Liquid 1 Application(s) Topical daily  aspirin  chewable 81 milliGRAM(s) Oral daily  diVALproex  milliGRAM(s) Oral two times a day  buDESOnide  80 MICROgram(s)/formoterol 4.5 MICROgram(s) Inhaler 2 Puff(s) Inhalation two times a day  polyethylene glycol 3350 17 Gram(s) Oral at bedtime  pantoprazole    Tablet 40 milliGRAM(s) Oral before breakfast  ALBUTerol/ipratropium for Nebulization 3 milliLiter(s) Nebulizer every 6 hours    MEDICATIONS  (PRN):  acetaminophen    Suspension 650 milliGRAM(s) Oral every 6 hours PRN For Temp greater than 38 C (100.4 F)  ALBUTerol/ipratropium for Nebulization 3 milliLiter(s) Nebulizer every 6 hours PRN Shortness of Breath and/or Wheezing  aluminum hydroxide/magnesium hydroxide/simethicone Suspension 30 milliLiter(s) Oral every 6 hours PRN Dyspepsia

## 2017-09-18 NOTE — DIETITIAN INITIAL EVALUATION ADULT. - OTHER INFO
Initial Dietitian Evaluation 2/2 to extended length of stay. Pt is a 47 M admitted with syncope and collapse.  Patient reports good PO intake at present and prior to admission. Patient denies any nausea/vomiting/diarrhea/constipation or difficulty chewing and swallowing.  Low sodium diet reviewed at length with patient and Pt was made aware of HbA1c 5.8 and diet also reviewed.

## 2017-09-19 LAB
24R-OH-CALCIDIOL SERPL-MCNC: 17.8 NG/ML — LOW (ref 30–100)
APTT BLD: 26.1 SEC — LOW (ref 27.5–37.4)
BUN SERPL-MCNC: 22 MG/DL — SIGNIFICANT CHANGE UP (ref 7–23)
C3 SERPL-MCNC: 115.2 MG/DL — SIGNIFICANT CHANGE UP (ref 90–180)
C4 SERPL-MCNC: 20.6 MG/DL — SIGNIFICANT CHANGE UP (ref 10–40)
CALCIUM SERPL-MCNC: 8.9 MG/DL — SIGNIFICANT CHANGE UP (ref 8.4–10.5)
CARDIOLIPIN IGM SER-MCNC: 10.55 GPL — SIGNIFICANT CHANGE UP (ref 0–23)
CARDIOLIPIN IGM SER-MCNC: 5.08 MPL — SIGNIFICANT CHANGE UP (ref 0–11)
CHLORIDE SERPL-SCNC: 101 MMOL/L — SIGNIFICANT CHANGE UP (ref 98–107)
CO2 SERPL-SCNC: 30 MMOL/L — SIGNIFICANT CHANGE UP (ref 22–31)
CREAT SERPL-MCNC: 1.01 MG/DL — SIGNIFICANT CHANGE UP (ref 0.5–1.3)
GLUCOSE SERPL-MCNC: 102 MG/DL — HIGH (ref 70–99)
HCT VFR BLD CALC: 39.9 % — SIGNIFICANT CHANGE UP (ref 39–50)
HGB BLD-MCNC: 12.8 G/DL — LOW (ref 13–17)
INR BLD: 1.19 — HIGH (ref 0.88–1.17)
MCHC RBC-ENTMCNC: 28.3 PG — SIGNIFICANT CHANGE UP (ref 27–34)
MCHC RBC-ENTMCNC: 32.1 % — SIGNIFICANT CHANGE UP (ref 32–36)
MCV RBC AUTO: 88.1 FL — SIGNIFICANT CHANGE UP (ref 80–100)
NRBC # FLD: 0 — SIGNIFICANT CHANGE UP
PLATELET # BLD AUTO: 118 K/UL — LOW (ref 150–400)
PMV BLD: 13 FL — SIGNIFICANT CHANGE UP (ref 7–13)
POTASSIUM SERPL-MCNC: 3.7 MMOL/L — SIGNIFICANT CHANGE UP (ref 3.5–5.3)
POTASSIUM SERPL-SCNC: 3.7 MMOL/L — SIGNIFICANT CHANGE UP (ref 3.5–5.3)
PROTHROM AB SERPL-ACNC: 13.4 SEC — HIGH (ref 9.8–13.1)
RBC # BLD: 4.53 M/UL — SIGNIFICANT CHANGE UP (ref 4.2–5.8)
RBC # FLD: 12.8 % — SIGNIFICANT CHANGE UP (ref 10.3–14.5)
SODIUM SERPL-SCNC: 143 MMOL/L — SIGNIFICANT CHANGE UP (ref 135–145)
THROMBIN TIME: 23.3 SEC — SIGNIFICANT CHANGE UP (ref 17–26)
VIT D25+D1,25 OH+D1,25 PNL SERPL-MCNC: 28.7 PG/ML — SIGNIFICANT CHANGE UP (ref 19.9–79.3)
WBC # BLD: 5.27 K/UL — SIGNIFICANT CHANGE UP (ref 3.8–10.5)
WBC # FLD AUTO: 5.27 K/UL — SIGNIFICANT CHANGE UP (ref 3.8–10.5)

## 2017-09-19 PROCEDURE — 99232 SBSQ HOSP IP/OBS MODERATE 35: CPT | Mod: GC

## 2017-09-19 PROCEDURE — 99233 SBSQ HOSP IP/OBS HIGH 50: CPT

## 2017-09-19 PROCEDURE — 70553 MRI BRAIN STEM W/O & W/DYE: CPT | Mod: 26

## 2017-09-19 PROCEDURE — 99231 SBSQ HOSP IP/OBS SF/LOW 25: CPT | Mod: GC

## 2017-09-19 RX ORDER — APIXABAN 2.5 MG/1
5 TABLET, FILM COATED ORAL EVERY 12 HOURS
Qty: 0 | Refills: 0 | Status: DISCONTINUED | OUTPATIENT
Start: 2017-09-19 | End: 2017-09-20

## 2017-09-19 RX ADMIN — BUDESONIDE AND FORMOTEROL FUMARATE DIHYDRATE 2 PUFF(S): 160; 4.5 AEROSOL RESPIRATORY (INHALATION) at 11:16

## 2017-09-19 RX ADMIN — Medication 81 MILLIGRAM(S): at 12:35

## 2017-09-19 RX ADMIN — DIVALPROEX SODIUM 500 MILLIGRAM(S): 500 TABLET, DELAYED RELEASE ORAL at 17:57

## 2017-09-19 RX ADMIN — PANTOPRAZOLE SODIUM 40 MILLIGRAM(S): 20 TABLET, DELAYED RELEASE ORAL at 06:50

## 2017-09-19 RX ADMIN — APIXABAN 5 MILLIGRAM(S): 2.5 TABLET, FILM COATED ORAL at 20:01

## 2017-09-19 RX ADMIN — BUDESONIDE AND FORMOTEROL FUMARATE DIHYDRATE 2 PUFF(S): 160; 4.5 AEROSOL RESPIRATORY (INHALATION) at 20:02

## 2017-09-19 RX ADMIN — Medication 3 MILLILITER(S): at 15:53

## 2017-09-19 RX ADMIN — ATORVASTATIN CALCIUM 80 MILLIGRAM(S): 80 TABLET, FILM COATED ORAL at 22:18

## 2017-09-19 RX ADMIN — DIVALPROEX SODIUM 500 MILLIGRAM(S): 500 TABLET, DELAYED RELEASE ORAL at 06:50

## 2017-09-19 RX ADMIN — Medication 3 MILLILITER(S): at 22:40

## 2017-09-19 RX ADMIN — ENOXAPARIN SODIUM 40 MILLIGRAM(S): 100 INJECTION SUBCUTANEOUS at 12:34

## 2017-09-19 RX ADMIN — Medication 3 MILLILITER(S): at 04:20

## 2017-09-19 RX ADMIN — Medication 3 MILLILITER(S): at 09:56

## 2017-09-19 RX ADMIN — CHLORHEXIDINE GLUCONATE 1 APPLICATION(S): 213 SOLUTION TOPICAL at 12:36

## 2017-09-19 NOTE — PROGRESS NOTE ADULT - SUBJECTIVE AND OBJECTIVE BOX
Subjective: Interval History - No events overnight  Reports feeling well, strength improving in left lower extremity.    Objective:   Vital Signs Last 24 Hrs  T(C): 36.8 (19 Sep 2017 06:39), Max: 36.8 (19 Sep 2017 06:39)  T(F): 98.2 (19 Sep 2017 06:39), Max: 98.2 (19 Sep 2017 06:39)  HR: 109 (19 Sep 2017 10:49) (69 - 109)  BP: 110/70 (19 Sep 2017 10:49) (107/74 - 116/78)  RR: 17 (19 Sep 2017 10:49) (17 - 18)  SpO2: 96% (19 Sep 2017 10:49) (96% - 99%)    General Exam:   General appearance: No acute distress                   Neurological Exam:  Mental Status: Orientated to self, date and place.  Attention intact.  No dysarthria, aphasia.     Cranial Nerves: EOMI, VFF. No facial asymmetry. Tongue midline    Motor:   Drift:  LUE minimal movement against gravity RUE no  LLE no RLE no    Strength:   L elbow 2/5  R deltoid 5/5 wrist extensor 5/5  L iliopsoas 4/5 anterior tibialis 4/5  R iliopsoas 5/5 anterior tibialis 5/5    Tone: normal          Dysmetria: None in right finger to nose    Sensation: intact to light touch, no extinction    Other:  09-19    143  |  101  |  22  ----------------------------<  102<H>  3.7   |  30  |  1.01    Ca    8.9      19 Sep 2017 06:30  Mg     1.9     09-18                        12.8   5.27  )-----------( 118      ( 19 Sep 2017 06:30 )             39.9     MEDICATIONS  (STANDING):  atorvastatin 80 milliGRAM(s) Oral at bedtime  enoxaparin Injectable 40 milliGRAM(s) SubCutaneous every 24 hours  chlorhexidine 4% Liquid 1 Application(s) Topical daily  aspirin  chewable 81 milliGRAM(s) Oral daily  diVALproex  milliGRAM(s) Oral two times a day  buDESOnide  80 MICROgram(s)/formoterol 4.5 MICROgram(s) Inhaler 2 Puff(s) Inhalation two times a day  polyethylene glycol 3350 17 Gram(s) Oral at bedtime  pantoprazole    Tablet 40 milliGRAM(s) Oral before breakfast  ALBUTerol/ipratropium for Nebulization 3 milliLiter(s) Nebulizer every 6 hours    MEDICATIONS  (PRN):  acetaminophen    Suspension 650 milliGRAM(s) Oral every 6 hours PRN For Temp greater than 38 C (100.4 F)  ALBUTerol/ipratropium for Nebulization 3 milliLiter(s) Nebulizer every 6 hours PRN Shortness of Breath and/or Wheezing  aluminum hydroxide/magnesium hydroxide/simethicone Suspension 30 milliLiter(s) Oral every 6 hours PRN Dyspepsia

## 2017-09-19 NOTE — PROGRESS NOTE ADULT - PROBLEM SELECTOR PLAN 1
1. PT- bed mobility,transfers, gait and balance training  2. OT- ADL'S  3. CVA-Asa/Lipitor. Outpt PFO closure    4. Patient would benefit from acute rehab, needs a multidisciplinary team including PT, OT and speech. Can tolerate 3 hours of therapy a day.  Will follow.

## 2017-09-19 NOTE — PROGRESS NOTE ADULT - PROBLEM SELECTOR PLAN 1
bilateral MCA and right PCA infarcts (R>L) in the frontoparietal and occipital areas which could be cardioembolic in source.  JONATAN-+PFO will d/w neuro and cardiology  in regards to PFO closure.  -case d/w Neurology repeat MRI   ASA 81 mg PO QD  Lipitor 80 mg PO QHS   [id number]-7.8 LDL-113  Tele monitor  PT/OT eval-PMR acute rehab bilateral MCA and right PCA infarcts (R>L) in the frontoparietal and occipital areas which could be cardioembolic in source.  JONATAN-+PFO will d/w neuro and cardiology  in regards to PFO closure.  -will review MRI with neurology   ASA 81 mg PO QD  Lipitor 80 mg PO QHS   [id number]-6.8 LDL-113  Tele monitor  PT/OT eval-PMR acute rehab bilateral MCA and right PCA infarcts (R>L) in the frontoparietal and occipital areas which could be cardioembolic in source.  JONATAN-+PFO will d/w neuro and cardiology  in regards to PFO closure.  -EP for ILR  -will review MRI with neurology   ASA 81 mg PO QD  Lipitor 80 mg PO QHS   [id number]-1.8 LDL-113  Tele monitor  PT/OT eval-PMR acute rehab

## 2017-09-19 NOTE — PROGRESS NOTE ADULT - SUBJECTIVE AND OBJECTIVE BOX
Patient is a 47y old  Male who presents with a chief complaint of "My leg wouldn't stop shaking." (14 Sep 2017 09:59)      SUBJECTIVE / OVERNIGHT EVENTS:    MEDICATIONS  (STANDING):  atorvastatin 80 milliGRAM(s) Oral at bedtime  enoxaparin Injectable 40 milliGRAM(s) SubCutaneous every 24 hours  chlorhexidine 4% Liquid 1 Application(s) Topical daily  aspirin  chewable 81 milliGRAM(s) Oral daily  diVALproex  milliGRAM(s) Oral two times a day  buDESOnide  80 MICROgram(s)/formoterol 4.5 MICROgram(s) Inhaler 2 Puff(s) Inhalation two times a day  polyethylene glycol 3350 17 Gram(s) Oral at bedtime  pantoprazole    Tablet 40 milliGRAM(s) Oral before breakfast  ALBUTerol/ipratropium for Nebulization 3 milliLiter(s) Nebulizer every 6 hours    MEDICATIONS  (PRN):  acetaminophen    Suspension 650 milliGRAM(s) Oral every 6 hours PRN For Temp greater than 38 C (100.4 F)  ALBUTerol/ipratropium for Nebulization 3 milliLiter(s) Nebulizer every 6 hours PRN Shortness of Breath and/or Wheezing  aluminum hydroxide/magnesium hydroxide/simethicone Suspension 30 milliLiter(s) Oral every 6 hours PRN Dyspepsia        CAPILLARY BLOOD GLUCOSE        I&O's Summary      T(C): 36.8 (09-19-17 @ 06:39), Max: 36.8 (09-19-17 @ 06:39)  HR: 90 (09-19-17 @ 06:39) (69 - 90)  BP: 107/74 (09-19-17 @ 06:39) (107/74 - 116/78)  RR: 18 (09-19-17 @ 06:39) (18 - 18)  SpO2: 99% (09-19-17 @ 06:39) (97% - 99%)    PHYSICAL EXAM:  GENERAL: NAD, well-developed  HEAD:  Atraumatic, Normocephalic  EYES: EOMI, PERRLA, conjunctiva and sclera clear  NECK: Supple, No JVD  CHEST/LUNG: Clear to auscultation bilaterally; No wheeze  HEART: Regular rate and rhythm; No murmurs, rubs, or gallops  ABDOMEN: Soft, Nontender, Nondistended; Bowel sounds present  EXTREMITIES:  2+ Peripheral Pulses, No clubbing, cyanosis, or edema  PSYCH: AAOx3  NEUROLOGY: non-focal  SKIN: No rashes or lesions    LABS:                        12.8   5.27  )-----------( 118      ( 19 Sep 2017 06:30 )             39.9     09-19    143  |  101  |  22  ----------------------------<  102<H>  3.7   |  30  |  1.01    Ca    8.9      19 Sep 2017 06:30  Mg     1.9     09-18      PT/INR - ( 19 Sep 2017 06:30 )   PT: 13.4 SEC;   INR: 1.19          PTT - ( 19 Sep 2017 06:30 )  PTT:26.1 SEC          RADIOLOGY & ADDITIONAL TESTS:    Imaging Personally Reviewed:    Consultant(s) Notes Reviewed:      Care Discussed with Consultants/Other Providers: Patient is a 47y old  Male who presents with a chief complaint of "My leg wouldn't stop shaking." (14 Sep 2017 09:59)      SUBJECTIVE / OVERNIGHT EVENTS:    MEDICATIONS  (STANDING):  atorvastatin 80 milliGRAM(s) Oral at bedtime  enoxaparin Injectable 40 milliGRAM(s) SubCutaneous every 24 hours  chlorhexidine 4% Liquid 1 Application(s) Topical daily  aspirin  chewable 81 milliGRAM(s) Oral daily  diVALproex  milliGRAM(s) Oral two times a day  buDESOnide  80 MICROgram(s)/formoterol 4.5 MICROgram(s) Inhaler 2 Puff(s) Inhalation two times a day  polyethylene glycol 3350 17 Gram(s) Oral at bedtime  pantoprazole    Tablet 40 milliGRAM(s) Oral before breakfast  ALBUTerol/ipratropium for Nebulization 3 milliLiter(s) Nebulizer every 6 hours    MEDICATIONS  (PRN):  acetaminophen    Suspension 650 milliGRAM(s) Oral every 6 hours PRN For Temp greater than 38 C (100.4 F)  ALBUTerol/ipratropium for Nebulization 3 milliLiter(s) Nebulizer every 6 hours PRN Shortness of Breath and/or Wheezing  aluminum hydroxide/magnesium hydroxide/simethicone Suspension 30 milliLiter(s) Oral every 6 hours PRN Dyspepsia        CAPILLARY BLOOD GLUCOSE        I&O's Summary      T(C): 36.8 (09-19-17 @ 06:39), Max: 36.8 (09-19-17 @ 06:39)  HR: 90 (09-19-17 @ 06:39) (69 - 90)  BP: 107/74 (09-19-17 @ 06:39) (107/74 - 116/78)  RR: 18 (09-19-17 @ 06:39) (18 - 18)  SpO2: 99% (09-19-17 @ 06:39) (97% - 99%)    PHYSICAL EXAM:  GENERAL: NAD, well-developed  HEAD:  Atraumatic, Normocephalic  EYES: EOMI, PERRLA, conjunctiva and sclera clear  NECK: Supple, No JVD  CHEST/LUNG: Clear to auscultation bilaterally; No wheeze  HEART: Regular rate and rhythm; No murmurs, rubs, or gallops  ABDOMEN: Soft, Nontender, Nondistended; Bowel sounds present  EXTREMITIES:  2+ Peripheral Pulses, No clubbing, cyanosis, or edema  PSYCH: AAOx3  NEUROLOGY: non-focal  SKIN: No rashes or lesions    LABS:                        12.8   5.27  )-----------( 118      ( 19 Sep 2017 06:30 )             39.9     09-19    143  |  101  |  22  ----------------------------<  102<H>  3.7   |  30  |  1.01    Ca    8.9      19 Sep 2017 06:30  Mg     1.9     09-18      PT/INR - ( 19 Sep 2017 06:30 )   PT: 13.4 SEC;   INR: 1.19          PTT - ( 19 Sep 2017 06:30 )  PTT:26.1 SEC          RADIOLOGY & ADDITIONAL TESTS:    Imaging Personally Reviewed:< from: MRI Head w/wo Cont (09.19.17 @ 02:07) >  he lateral ventricles have a normal configuration. Again seen are   gyriform type of enhancement involving the high bilateral frontal   parietal region (right greater than left), and right occipital cortex.   These findings again demonstrate increased signal on the   diffusion-weighted sequence. There is slight increase gyriform type of   enhancement now identified when compared the prior exam. These findings   could be compatible with areas of subacute infarcts and PRESS, though can   also be related to sarcoidosis given patient's history. Clinical   correlation continued close no follow-up is recommended. Subtle areaof   increased signal is now seen involving the right linda and cerebral   peduncle on the diffusion-weighted sequence.    There is no evidence of acute hemorrhage in the posterior fossa   supratentorial region. There is no evidence of acute hemorrhage in   posterior fossa or supratentorial region.    There are no abnormal intra-axial or extra-axial collections seen    Large vessels and trait normal flow voids    Bilateral sphenoid, right ethmoid sinus mucosal thickening is seen. Small   air-fluidlevel seen involving left maxillary sinus.    Impression: Abnormal T2 prolongation with increased signal on the   diffusion-weighted sequence is again identified in the supratentorial   region. These areas now demonstrate increased gyriform type of   enhancement. These could be compatible areas of subacute infarcts as well   as Press though can also be related to patient's known sarcoidosis.   Clinical correlation and continued close interval follow-up is   recommended.    < end of copied text >      Consultant(s) Notes Reviewed:      Care Discussed with Consultants/Other Providers: Patient is a 47y old  Male who presents with a chief complaint of "My leg wouldn't stop shaking." (14 Sep 2017 09:59)      SUBJECTIVE / OVERNIGHT EVENTS: Tele Sinus tach with PVC and bigeminy    MEDICATIONS  (STANDING):  atorvastatin 80 milliGRAM(s) Oral at bedtime  enoxaparin Injectable 40 milliGRAM(s) SubCutaneous every 24 hours  chlorhexidine 4% Liquid 1 Application(s) Topical daily  aspirin  chewable 81 milliGRAM(s) Oral daily  diVALproex  milliGRAM(s) Oral two times a day  buDESOnide  80 MICROgram(s)/formoterol 4.5 MICROgram(s) Inhaler 2 Puff(s) Inhalation two times a day  polyethylene glycol 3350 17 Gram(s) Oral at bedtime  pantoprazole    Tablet 40 milliGRAM(s) Oral before breakfast  ALBUTerol/ipratropium for Nebulization 3 milliLiter(s) Nebulizer every 6 hours    MEDICATIONS  (PRN):  acetaminophen    Suspension 650 milliGRAM(s) Oral every 6 hours PRN For Temp greater than 38 C (100.4 F)  ALBUTerol/ipratropium for Nebulization 3 milliLiter(s) Nebulizer every 6 hours PRN Shortness of Breath and/or Wheezing  aluminum hydroxide/magnesium hydroxide/simethicone Suspension 30 milliLiter(s) Oral every 6 hours PRN Dyspepsia        CAPILLARY BLOOD GLUCOSE        I&O's Summary      T(C): 36.8 (09-19-17 @ 06:39), Max: 36.8 (09-19-17 @ 06:39)  HR: 90 (09-19-17 @ 06:39) (69 - 90)  BP: 107/74 (09-19-17 @ 06:39) (107/74 - 116/78)  RR: 18 (09-19-17 @ 06:39) (18 - 18)  SpO2: 99% (09-19-17 @ 06:39) (97% - 99%)    PHYSICAL EXAM:  GENERAL: NAD, well-developed  HEAD:  Atraumatic, Normocephalic  EYES: EOMI, PERRLA, conjunctiva and sclera clear  NECK: Supple, No JVD  CHEST/LUNG: +wheeze  HEART: Regular rate and rhythm; No murmurs, rubs, or gallops  ABDOMEN: Soft, Nontender, Nondistended; Bowel sounds present  EXTREMITIES:  2+ Peripheral Pulses, No clubbing, cyanosis, or edema  PSYCH: AAOx3  NEUROLOGY: LT UE/LE 3/5  SKIN: No rashes or lesions    LABS:                        12.8   5.27  )-----------( 118      ( 19 Sep 2017 06:30 )             39.9     09-19    143  |  101  |  22  ----------------------------<  102<H>  3.7   |  30  |  1.01    Ca    8.9      19 Sep 2017 06:30  Mg     1.9     09-18      PT/INR - ( 19 Sep 2017 06:30 )   PT: 13.4 SEC;   INR: 1.19          PTT - ( 19 Sep 2017 06:30 )  PTT:26.1 SEC          RADIOLOGY & ADDITIONAL TESTS:    Imaging Personally Reviewed:< from: MRI Head w/wo Cont (09.19.17 @ 02:07) >  he lateral ventricles have a normal configuration. Again seen are   gyriform type of enhancement involving the high bilateral frontal   parietal region (right greater than left), and right occipital cortex.   These findings again demonstrate increased signal on the   diffusion-weighted sequence. There is slight increase gyriform type of   enhancement now identified when compared the prior exam. These findings   could be compatible with areas of subacute infarcts and PRESS, though can   also be related to sarcoidosis given patient's history. Clinical   correlation continued close no follow-up is recommended. Subtle areaof   increased signal is now seen involving the right linda and cerebral   peduncle on the diffusion-weighted sequence.    There is no evidence of acute hemorrhage in the posterior fossa   supratentorial region. There is no evidence of acute hemorrhage in   posterior fossa or supratentorial region.    There are no abnormal intra-axial or extra-axial collections seen    Large vessels and trait normal flow voids    Bilateral sphenoid, right ethmoid sinus mucosal thickening is seen. Small   air-fluidlevel seen involving left maxillary sinus.    Impression: Abnormal T2 prolongation with increased signal on the   diffusion-weighted sequence is again identified in the supratentorial   region. These areas now demonstrate increased gyriform type of   enhancement. These could be compatible areas of subacute infarcts as well   as Press though can also be related to patient's known sarcoidosis.   Clinical correlation and continued close interval follow-up is   recommended.    < end of copied text >      Consultant(s) Notes Reviewed:      Care Discussed with Consultants/Other Providers: Patient is a 47y old  Male who presents with a chief complaint of "My leg wouldn't stop shaking." (14 Sep 2017 09:59)      SUBJECTIVE / OVERNIGHT EVENTS: Tele Sinus tach with PVC and bigeminy. no acute compliants     MEDICATIONS  (STANDING):  atorvastatin 80 milliGRAM(s) Oral at bedtime  enoxaparin Injectable 40 milliGRAM(s) SubCutaneous every 24 hours  chlorhexidine 4% Liquid 1 Application(s) Topical daily  aspirin  chewable 81 milliGRAM(s) Oral daily  diVALproex  milliGRAM(s) Oral two times a day  buDESOnide  80 MICROgram(s)/formoterol 4.5 MICROgram(s) Inhaler 2 Puff(s) Inhalation two times a day  polyethylene glycol 3350 17 Gram(s) Oral at bedtime  pantoprazole    Tablet 40 milliGRAM(s) Oral before breakfast  ALBUTerol/ipratropium for Nebulization 3 milliLiter(s) Nebulizer every 6 hours    MEDICATIONS  (PRN):  acetaminophen    Suspension 650 milliGRAM(s) Oral every 6 hours PRN For Temp greater than 38 C (100.4 F)  ALBUTerol/ipratropium for Nebulization 3 milliLiter(s) Nebulizer every 6 hours PRN Shortness of Breath and/or Wheezing  aluminum hydroxide/magnesium hydroxide/simethicone Suspension 30 milliLiter(s) Oral every 6 hours PRN Dyspepsia        CAPILLARY BLOOD GLUCOSE        I&O's Summary      T(C): 36.8 (09-19-17 @ 06:39), Max: 36.8 (09-19-17 @ 06:39)  HR: 90 (09-19-17 @ 06:39) (69 - 90)  BP: 107/74 (09-19-17 @ 06:39) (107/74 - 116/78)  RR: 18 (09-19-17 @ 06:39) (18 - 18)  SpO2: 99% (09-19-17 @ 06:39) (97% - 99%)    PHYSICAL EXAM:  GENERAL: NAD, well-developed  HEAD:  Atraumatic, Normocephalic  EYES: EOMI, PERRLA, conjunctiva and sclera clear  NECK: Supple, No JVD  CHEST/LUNG: +wheeze  HEART: Regular rate and rhythm; No murmurs, rubs, or gallops  ABDOMEN: Soft, Nontender, Nondistended; Bowel sounds present  EXTREMITIES:  2+ Peripheral Pulses, No clubbing, cyanosis, or edema  PSYCH: AAOx3  NEUROLOGY: LT UE/LE 3/5  SKIN: No rashes or lesions    LABS:                        12.8   5.27  )-----------( 118      ( 19 Sep 2017 06:30 )             39.9     09-19    143  |  101  |  22  ----------------------------<  102<H>  3.7   |  30  |  1.01    Ca    8.9      19 Sep 2017 06:30  Mg     1.9     09-18      PT/INR - ( 19 Sep 2017 06:30 )   PT: 13.4 SEC;   INR: 1.19          PTT - ( 19 Sep 2017 06:30 )  PTT:26.1 SEC          RADIOLOGY & ADDITIONAL TESTS:    Imaging Personally Reviewed:< from: MRI Head w/wo Cont (09.19.17 @ 02:07) >  he lateral ventricles have a normal configuration. Again seen are   gyriform type of enhancement involving the high bilateral frontal   parietal region (right greater than left), and right occipital cortex.   These findings again demonstrate increased signal on the   diffusion-weighted sequence. There is slight increase gyriform type of   enhancement now identified when compared the prior exam. These findings   could be compatible with areas of subacute infarcts and PRESS, though can   also be related to sarcoidosis given patient's history. Clinical   correlation continued close no follow-up is recommended. Subtle areaof   increased signal is now seen involving the right linda and cerebral   peduncle on the diffusion-weighted sequence.    There is no evidence of acute hemorrhage in the posterior fossa   supratentorial region. There is no evidence of acute hemorrhage in   posterior fossa or supratentorial region.    There are no abnormal intra-axial or extra-axial collections seen    Large vessels and trait normal flow voids    Bilateral sphenoid, right ethmoid sinus mucosal thickening is seen. Small   air-fluidlevel seen involving left maxillary sinus.    Impression: Abnormal T2 prolongation with increased signal on the   diffusion-weighted sequence is again identified in the supratentorial   region. These areas now demonstrate increased gyriform type of   enhancement. These could be compatible areas of subacute infarcts as well   as Press though can also be related to patient's known sarcoidosis.   Clinical correlation and continued close interval follow-up is   recommended.    < end of copied text >      Consultant(s) Notes Reviewed:      Care Discussed with Consultants/Other Providers:

## 2017-09-19 NOTE — PROGRESS NOTE ADULT - ASSESSMENT
47 year old -American male initially presenting with LLE spasm. Hospital course complicated by an episode of unresponsiveness and gaze deviation concerning for seizure vs stroke. He was subsequently intubated for airway protection and extubated. Post extubation, mental status improved, though exam notable for L-sided weakness. EEG showed burst suppression pattern while on sedation. MRI brain showed bilateral (R>L) MCA and right PCA infarcts in the frontoparietal and occipital areas which could be cardioembolic in source. MRA head/neck is grossly unremarkable although limited by motion. LE doppler negative for DVT bilaterally. JONATAN showed large PFO.    Repeat MRI brain with and without contrast gyriform type of enhancement involving the high bilateral frontal parietal region (right greater than left), and right occipital cortex. These findings again  demonstrate increased signal on the diffusion-weighted sequence. There is slight increase gyriform type of enhancement now identified which may be PRES or areas of subacute infarcts.    Acute infarct in bilateral MCA and right PCA likely secondary to ESUS.  Seizure activity may be secondary to PRES.

## 2017-09-19 NOTE — PROGRESS NOTE ADULT - SUBJECTIVE AND OBJECTIVE BOX
Date of Admission:    24H hour events: No acute events overnight. Asymptomatic this am. In good spirits.     MEDICATIONS:  enoxaparin Injectable 40 milliGRAM(s) SubCutaneous every 24 hours  aspirin  chewable 81 milliGRAM(s) Oral daily      ALBUTerol/ipratropium for Nebulization 3 milliLiter(s) Nebulizer every 6 hours PRN  buDESOnide  80 MICROgram(s)/formoterol 4.5 MICROgram(s) Inhaler 2 Puff(s) Inhalation two times a day  ALBUTerol/ipratropium for Nebulization 3 milliLiter(s) Nebulizer every 6 hours    acetaminophen    Suspension 650 milliGRAM(s) Oral every 6 hours PRN  diVALproex  milliGRAM(s) Oral two times a day    polyethylene glycol 3350 17 Gram(s) Oral at bedtime  aluminum hydroxide/magnesium hydroxide/simethicone Suspension 30 milliLiter(s) Oral every 6 hours PRN  pantoprazole    Tablet 40 milliGRAM(s) Oral before breakfast    atorvastatin 80 milliGRAM(s) Oral at bedtime    chlorhexidine 4% Liquid 1 Application(s) Topical daily      REVIEW OF SYSTEMS:  Complete 10point ROS negative.    PHYSICAL EXAM:  T(C): 36.8 (09-19-17 @ 06:39), Max: 36.8 (09-19-17 @ 06:39)  HR: 109 (09-19-17 @ 10:49) (69 - 109)  BP: 110/70 (09-19-17 @ 10:49) (107/74 - 116/78)  RR: 17 (09-19-17 @ 10:49) (17 - 18)  SpO2: 96% (09-19-17 @ 10:49) (96% - 99%)  Wt(kg): --  I&O's Summary      Appearance: Normal	  HEENT:   Normal oral mucosa, PERRL, EOMI	  Lymphatic: No lymphadenopathy  Cardiovascular: Normal S1 S2, No JVD, No edema  Respiratory: Lungs clear to auscultation	  Psychiatry: A & O x 3, Mood & affect appropriate  Gastrointestinal:  Soft, Non-tender, + BS	  Skin: No rashes, No ecchymoses, No cyanosis	  Extremities: No clubbing, cyanosis or edema  Vascular: Peripheral pulses palpable 2+ bilaterally        LABS:	 	    CBC Full  -  ( 19 Sep 2017 06:30 )  WBC Count : 5.27 K/uL  Hemoglobin : 12.8 g/dL  Hematocrit : 39.9 %  Platelet Count - Automated : 118 K/uL  Mean Cell Volume : 88.1 fL  Mean Cell Hemoglobin : 28.3 pg  Mean Cell Hemoglobin Concentration : 32.1 %    09-19    143  |  101  |  22  ----------------------------<  102<H>  3.7   |  30  |  1.01  09-18    143  |  100  |  20  ----------------------------<  82  3.7   |  31  |  1.02    Ca    8.9      19 Sep 2017 06:30  Ca    8.9      18 Sep 2017 06:31  Mg     1.9     09-18        TELEMETRY: 	  SR 80-110s    PREVIOUS DIAGNOSTIC TESTING:    [ ] Echocardiogram: < from: JONATAN w/o TTE (w/3D Echo) (09.18.17 @ 17:45) >  CONCLUSIONS:  1. Normal mitral valve. Minimal mitral regurgitation.  2. Normal trileaflet aortic valve. No aortic valve  regurgitation seen.  3. Normal aortic root, aortic arch and descending thoracic  aorta.  4. Normal left atrium.  No left atrial or left atrial  appendage thrombus.  5. Normal left ventricular systolic function. No segmental  wall motion abnormalities.  6. Right ventricular enlargement with decreased right  ventricular systolic function.  7. Agitated saline injection and color flow Doppler  demonstrate evidence of a large patent foramen ovale.    	  ASSESSMENT/PLAN:     47M w/HTN, COPD, asthma, sarcoidosis with hx of pneumothoracies cardiology consulted for EKG changes course complicated by seizure activity in setting of R MCA and PCA infarcts now found to have large PFO on JONATAN w/normal LV function.     #Cryptogenic Stroke with large PFO seen on JONATAN 9/18. No LV thrombus seen on JONATAN. Tele with Sinus tach. No noted afib.   -Outpatient referral to Dr. Jamie Dominguez at [facility] for evaluation of PFO closure with Amplatzer device   - c/w ASA and lipitor 	  - Case d/w primary team and patient who is agreeable with f/u with Dr. Dominguez.   - dispo planning to rehab     Please feel free to  re-call with any questions.   [id number] Date of Admission:    24H hour events: No acute events overnight. Asymptomatic this am. In good spirits.     MEDICATIONS:  enoxaparin Injectable 40 milliGRAM(s) SubCutaneous every 24 hours  aspirin  chewable 81 milliGRAM(s) Oral daily      ALBUTerol/ipratropium for Nebulization 3 milliLiter(s) Nebulizer every 6 hours PRN  buDESOnide  80 MICROgram(s)/formoterol 4.5 MICROgram(s) Inhaler 2 Puff(s) Inhalation two times a day  ALBUTerol/ipratropium for Nebulization 3 milliLiter(s) Nebulizer every 6 hours    acetaminophen    Suspension 650 milliGRAM(s) Oral every 6 hours PRN  diVALproex  milliGRAM(s) Oral two times a day    polyethylene glycol 3350 17 Gram(s) Oral at bedtime  aluminum hydroxide/magnesium hydroxide/simethicone Suspension 30 milliLiter(s) Oral every 6 hours PRN  pantoprazole    Tablet 40 milliGRAM(s) Oral before breakfast    atorvastatin 80 milliGRAM(s) Oral at bedtime    chlorhexidine 4% Liquid 1 Application(s) Topical daily      REVIEW OF SYSTEMS:  Complete 10point ROS negative.    PHYSICAL EXAM:  T(C): 36.8 (09-19-17 @ 06:39), Max: 36.8 (09-19-17 @ 06:39)  HR: 109 (09-19-17 @ 10:49) (69 - 109)  BP: 110/70 (09-19-17 @ 10:49) (107/74 - 116/78)  RR: 17 (09-19-17 @ 10:49) (17 - 18)  SpO2: 96% (09-19-17 @ 10:49) (96% - 99%)  Wt(kg): --  I&O's Summary      Appearance: Normal	  HEENT:   Normal oral mucosa, PERRL, EOMI	  Lymphatic: No lymphadenopathy  Cardiovascular: Normal S1 S2, No JVD, No edema  Respiratory: Lungs clear to auscultation	  Psychiatry: A & O x 3, Mood & affect appropriate  Gastrointestinal:  Soft, Non-tender, + BS	  Skin: No rashes, No ecchymoses, No cyanosis	  Extremities: No clubbing, cyanosis or edema  Vascular: Peripheral pulses palpable 2+ bilaterally        LABS:	 	    CBC Full  -  ( 19 Sep 2017 06:30 )  WBC Count : 5.27 K/uL  Hemoglobin : 12.8 g/dL  Hematocrit : 39.9 %  Platelet Count - Automated : 118 K/uL  Mean Cell Volume : 88.1 fL  Mean Cell Hemoglobin : 28.3 pg  Mean Cell Hemoglobin Concentration : 32.1 %    09-19    143  |  101  |  22  ----------------------------<  102<H>  3.7   |  30  |  1.01  09-18    143  |  100  |  20  ----------------------------<  82  3.7   |  31  |  1.02    Ca    8.9      19 Sep 2017 06:30  Ca    8.9      18 Sep 2017 06:31  Mg     1.9     09-18        TELEMETRY: 	  SR 80-110s    PREVIOUS DIAGNOSTIC TESTING:    [ ] Echocardiogram: < from: JONATAN w/o TTE (w/3D Echo) (09.18.17 @ 17:45) >  CONCLUSIONS:  1. Normal mitral valve. Minimal mitral regurgitation.  2. Normal trileaflet aortic valve. No aortic valve  regurgitation seen.  3. Normal aortic root, aortic arch and descending thoracic  aorta.  4. Normal left atrium.  No left atrial or left atrial  appendage thrombus.  5. Normal left ventricular systolic function. No segmental  wall motion abnormalities.  6. Right ventricular enlargement with decreased right  ventricular systolic function.  7. Agitated saline injection and color flow Doppler  demonstrate evidence of a large patent foramen ovale.    	  ASSESSMENT/PLAN:     47M w/HTN, COPD, asthma, sarcoidosis with hx of pneumothoracies cardiology consulted for EKG changes course complicated by seizure activity in setting of R MCA and PCA infarcts now found to have large PFO on JONATAN w/normal LV function.     #Cryptogenic Stroke with large PFO seen on JONATAN 9/18. No LV thrombus seen on JONATAN. Tele with Sinus tach. No noted afib.   -Outpatient referral to Dr. Jamie Dominguez at [facility] for evaluation of PFO closure with Amplatzer device   - Recommend starting coumadin goal INR 2-3; Can be titrated as an outpatient.    - c/w ASA and lipitor 	  - Case d/w primary team and patient who is agreeable with f/u with Dr. Dominguez.   - dispo planning to rehab     Please feel free to re-call with any questions.   [id number]

## 2017-09-19 NOTE — PROGRESS NOTE ADULT - PROBLEM SELECTOR PLAN 1
Loop recorder can be done as outpatient  As per cardio, PFO closure may be done as outpatient  ASA 81 mg PO QD  Lipitor 80 mg PO QHS  Tele monitor  PT/OT eval and consult Dr. Vannesa Bedoya (physiatry)  DVT prophylaxis with lovenox  Control vascular risk factors  Neurologically stable for discharge with outpatient follow up with Dr. Lopez [phone]

## 2017-09-19 NOTE — PROGRESS NOTE ADULT - SUBJECTIVE AND OBJECTIVE BOX
missed to write note yesterday ,  hence writing today     pt felt well  no symptoms  feeling fine in his breathing   Patient is a 47y old  Male who presents with a chief complaint of "My leg wouldn't stop shaking." (14 Sep 2017 09:59)      Any change in ROS:     MEDICATIONS  (STANDING):  atorvastatin 80 milliGRAM(s) Oral at bedtime  chlorhexidine 4% Liquid 1 Application(s) Topical daily  aspirin  chewable 81 milliGRAM(s) Oral daily  diVALproex  milliGRAM(s) Oral two times a day  buDESOnide  80 MICROgram(s)/formoterol 4.5 MICROgram(s) Inhaler 2 Puff(s) Inhalation two times a day  polyethylene glycol 3350 17 Gram(s) Oral at bedtime  pantoprazole    Tablet 40 milliGRAM(s) Oral before breakfast  ALBUTerol/ipratropium for Nebulization 3 milliLiter(s) Nebulizer every 6 hours  predniSONE   Tablet 40 milliGRAM(s) Oral daily    MEDICATIONS  (PRN):  acetaminophen    Suspension 650 milliGRAM(s) Oral every 6 hours PRN For Temp greater than 38 C (100.4 F)  ALBUTerol/ipratropium for Nebulization 3 milliLiter(s) Nebulizer every 6 hours PRN Shortness of Breath and/or Wheezing  aluminum hydroxide/magnesium hydroxide/simethicone Suspension 30 milliLiter(s) Oral every 6 hours PRN Dyspepsia    Vital Signs Last 24 Hrs  T(C): 37.1 (20 Sep 2017 13:56), Max: 37.1 (20 Sep 2017 13:56)  T(F): 98.7 (20 Sep 2017 13:56), Max: 98.7 (20 Sep 2017 13:56)  HR: 87 (20 Sep 2017 13:56) (87 - 113)  BP: 133/99 (20 Sep 2017 13:56) (115/83 - 148/104)  BP(mean): --  RR: 18 (20 Sep 2017 13:56) (18 - 23)  SpO2: 99% (20 Sep 2017 13:56) (95% - 99%)    I&O's Summary        Physical Exam:   GENERAL: NAD, well-groomed, well-developed  HEENT: GIOVANI/   Atraumatic, Normocephalic  ENMT: No tonsillar erythema, exudates, or enlargement; Moist mucous membranes, Good dentition, No lesions  NECK: Supple, No JVD, Normal thyroid  CHEST/LUNG: mild coarse rhonhchi  CVS: Regular rate and rhythm; No murmurs, rubs, or gallops  GI: : Soft, Nontender, Nondistended; Bowel sounds present  NERVOUS SYSTEM:  Alert & Oriented X3  EXTREMITIES:  2+ Peripheral Pulses, No clubbing, cyanosis, or edema  LYMPH: No lymphadenopathy noted  SKIN: No rashes or lesions  ENDOCRINOLOGY: No Thyromegaly  PSYCH: Appropriate    Labs:                              12.9   5.76  )-----------( 125      ( 20 Sep 2017 09:00 )             40.3                         12.2   5.63  )-----------( 116      ( 20 Sep 2017 06:35 )             38.2                         12.8   5.27  )-----------( 118      ( 19 Sep 2017 06:30 )             39.9                         12.7   7.47  )-----------( 112      ( 18 Sep 2017 06:31 )             40.4                         13.1   5.97  )-----------( 115      ( 17 Sep 2017 06:35 )             41.0     09-19    143  |  101  |  22  ----------------------------<  102<H>  3.7   |  30  |  1.01  09-18    143  |  100  |  20  ----------------------------<  82  3.7   |  31  |  1.02  09-17    141  |  98  |  20  ----------------------------<  91  3.9   |  34<H>  |  1.02    Ca    8.9      19 Sep 2017 06:30      CAPILLARY BLOOD GLUCOSE            PT/INR - ( 20 Sep 2017 09:00 )   PT: 14.2 SEC;   INR: 1.26          PTT - ( 20 Sep 2017 09:00 )  PTT:28.6 SEC    Cultures:                             Studies  Chest X-RAY  CT SCAN Chest   Venous Dopplers: LE:   CT Abdomen  Others

## 2017-09-19 NOTE — PROGRESS NOTE ADULT - SUBJECTIVE AND OBJECTIVE BOX
HPI:  Patient is a 47 year old male with a PMHx of sarcoidosis, HTN, COPD, and asthma who presents to the ED with severe leg spasms. These intermittent left leg spasms began suddenly two days ago in the patient's home and have become more regular and worsening in contracture. The spasm began in the quad leading to severe erratic left leg movements that can last from seconds to minutes. Once the leg spasm and contracture stops, an "intense tingling and pain sensation" which starts in the foot and radiates up the left side of his body to his neck. Once the tingling sensation reached his neck, the patient became faint, but did not fully lose consciousness. The patient also had a HA at the time of the spasm and tingling which was diffuse throughout the head and rated a 10/10 on a pain scale. On admission pt found to have moderate inspiratory and expiratory wheezes. Initial CT head negative. Treated for COPD exacerbation. RRT on tele floor for CVA vs. seizure activity. AMS with obtundation, intubated for airway and transfered to MICU. On Propofol/Neosynephrine. Seen by neuro with recs for MRI, EEG 24 hr w/ video, and valproic acid load. Concern for new fat emboli seen on CT head- however no identifable source. 9/13: MRI-  bilateral (R>L) MCA and right PCA infarcts in the frontoparietal and occipital areas which could be cardioembolic in source. TTE- positive bubble study.   Interval- Improvement in LLE, feels motivated for rehab     REVIEW OF SYSTEMS: No chest pain, shortness of breath, nausea, vomiting or diarhea.      CURRENT FUNCTIONAL STATUS: mod a     MEDICATIONS  (STANDING):  atorvastatin 80 milliGRAM(s) Oral at bedtime  enoxaparin Injectable 40 milliGRAM(s) SubCutaneous every 24 hours  chlorhexidine 4% Liquid 1 Application(s) Topical daily  aspirin  chewable 81 milliGRAM(s) Oral daily  diVALproex  milliGRAM(s) Oral two times a day  buDESOnide  80 MICROgram(s)/formoterol 4.5 MICROgram(s) Inhaler 2 Puff(s) Inhalation two times a day  polyethylene glycol 3350 17 Gram(s) Oral at bedtime  pantoprazole    Tablet 40 milliGRAM(s) Oral before breakfast  ALBUTerol/ipratropium for Nebulization 3 milliLiter(s) Nebulizer every 6 hours  Vital Signs Last 24 Hrs  T(C): 36.8 (19 Sep 2017 14:12), Max: 36.8 (19 Sep 2017 06:39)  T(F): 98.3 (19 Sep 2017 14:12), Max: 98.3 (19 Sep 2017 14:12)  HR: 100 (19 Sep 2017 15:53) (69 - 109)  BP: 124/85 (19 Sep 2017 14:12) (107/74 - 124/85)  BP(mean): --  RR: 18 (19 Sep 2017 14:12) (17 - 18)  SpO2: 97% (19 Sep 2017 15:53) (96% - 99%)                ----------------------------------------------------------------------------------------  PHYSICAL EXAM  Constitutional - NAD, Comfortable  HEENT - NCAT, EOMI  Neck - Supple, No limited ROM  Chest - CTA bilaterally, No wheeze, No rhonchi, No crackles  Cardiovascular - RRR, S1S2, No murmurs  Abdomen - BS+, Soft, NTND  Extremities - No C/C/E, No calf tenderness   Neurologic Exam -                    Cognitive - Awake, Alert, AAO to self, place, date, year, situation     Communication - Fluent, No dysarthria, no aphasia     Cranial Nerves - CN 2-12 intact     Motor - LUE 0/5, LLE 4-/5, RUE/RLE 5/5                       Sensory - Intact to LT     Reflexes - DTR Intact, No primitive reflexive     Balance - poor   Psychiatric - Mood stable, Affect WNL

## 2017-09-19 NOTE — PROGRESS NOTE ADULT - PROBLEM SELECTOR PLAN 2
WBC normal currently, d/c abx. URI vs +HELENE rheumatology consult-possibly secondary to URI f/u serology

## 2017-09-19 NOTE — PROGRESS NOTE ADULT - SUBJECTIVE AND OBJECTIVE BOX
OSCAR GINNA  [id number]    INTERVAL HPI/OVERNIGHT EVENTS: No acute events overnight. Pt denies any headaches, visual disturbances, CP, worsening SOB, abd pain, N/V, joint or muscle pains.    MEDICATIONS  (STANDING):  atorvastatin 80 milliGRAM(s) Oral at bedtime  enoxaparin Injectable 40 milliGRAM(s) SubCutaneous every 24 hours  chlorhexidine 4% Liquid 1 Application(s) Topical daily  aspirin  chewable 81 milliGRAM(s) Oral daily  diVALproex  milliGRAM(s) Oral two times a day  buDESOnide  80 MICROgram(s)/formoterol 4.5 MICROgram(s) Inhaler 2 Puff(s) Inhalation two times a day  polyethylene glycol 3350 17 Gram(s) Oral at bedtime  pantoprazole    Tablet 40 milliGRAM(s) Oral before breakfast  ALBUTerol/ipratropium for Nebulization 3 milliLiter(s) Nebulizer every 6 hours    MEDICATIONS  (PRN):  acetaminophen    Suspension 650 milliGRAM(s) Oral every 6 hours PRN For Temp greater than 38 C (100.4 F)  ALBUTerol/ipratropium for Nebulization 3 milliLiter(s) Nebulizer every 6 hours PRN Shortness of Breath and/or Wheezing  aluminum hydroxide/magnesium hydroxide/simethicone Suspension 30 milliLiter(s) Oral every 6 hours PRN Dyspepsia      Allergies    No Known Allergies    Intolerances        Review of Systems:   General: No fevers/chills, no fatigue  HEENT: No blurry vision, dysphagia, or odynophagia  CVS: No CP/palpitations  Resp: +SOB; no wheezing  GI: No N/V/C/D/abdominal pain  MSK: No joint or muscle pain/swelling  Skin: No new rashes  Neuro: No headaches, Left sided weakness      Vital Signs Last 24 Hrs  T(C): 36.8 (19 Sep 2017 06:39), Max: 36.8 (19 Sep 2017 06:39)  T(F): 98.2 (19 Sep 2017 06:39), Max: 98.2 (19 Sep 2017 06:39)  HR: 90 (19 Sep 2017 06:39) (69 - 90)  BP: 107/74 (19 Sep 2017 06:39) (107/74 - 116/78)  BP(mean): --  RR: 18 (19 Sep 2017 06:39) (18 - 18)  SpO2: 99% (19 Sep 2017 06:39) (97% - 99%)    Physical Exam:  General: NAD, laying comfortably on bed.  HEENT: EOMI, MMM  Cardio: +S1/S2, RRR  Resp: +wheezing  GI: +BS, soft, NT/ND  MSK: Normal ROM, LUE and LLE str 3/5 (able to lift against gravity).  Neuro: AAOx3, CN II-XII intact.  Psych: wnl    LABS:                        12.8   5.27  )-----------( 118      ( 19 Sep 2017 06:30 )             39.9     09-19    143  |  101  |  22  ----------------------------<  102<H>  3.7   |  30  |  1.01    Ca    8.9      19 Sep 2017 06:30  Mg     1.9     09-18      PT/INR - ( 19 Sep 2017 06:30 )   PT: 13.4 SEC;   INR: 1.19          PTT - ( 19 Sep 2017 06:30 )  PTT:26.1 SEC    C3 115.2  C4 20.6    RADIOLOGY & ADDITIONAL TESTS:  < from: MRI Head w/wo Cont (09.19.17 @ 02:07) >  Impression: Abnormal T2 prolongation with increased signal on the   diffusion-weighted sequence is again identified in the supratentorial   region. These areas now demonstrate increased gyriform type of   enhancement. These could be compatible areas of subacute infarcts as well   as Press though can also be related to patient's known sarcoidosis.   Clinical correlation and continued close interval follow-up is   recommended.    < end of copied text > OSCAR GINNA  [id number]    INTERVAL HPI/OVERNIGHT EVENTS: No acute events overnight. Pt denies any headaches, visual disturbances, CP, worsening SOB, abd pain, N/V, joint or muscle pains.    MEDICATIONS  (STANDING):  atorvastatin 80 milliGRAM(s) Oral at bedtime  enoxaparin Injectable 40 milliGRAM(s) SubCutaneous every 24 hours  chlorhexidine 4% Liquid 1 Application(s) Topical daily  aspirin  chewable 81 milliGRAM(s) Oral daily  diVALproex  milliGRAM(s) Oral two times a day  buDESOnide  80 MICROgram(s)/formoterol 4.5 MICROgram(s) Inhaler 2 Puff(s) Inhalation two times a day  polyethylene glycol 3350 17 Gram(s) Oral at bedtime  pantoprazole    Tablet 40 milliGRAM(s) Oral before breakfast  ALBUTerol/ipratropium for Nebulization 3 milliLiter(s) Nebulizer every 6 hours    MEDICATIONS  (PRN):  acetaminophen    Suspension 650 milliGRAM(s) Oral every 6 hours PRN For Temp greater than 38 C (100.4 F)  ALBUTerol/ipratropium for Nebulization 3 milliLiter(s) Nebulizer every 6 hours PRN Shortness of Breath and/or Wheezing  aluminum hydroxide/magnesium hydroxide/simethicone Suspension 30 milliLiter(s) Oral every 6 hours PRN Dyspepsia      Allergies    No Known Allergies    Intolerances        Review of Systems:   General: No fevers/chills, no fatigue  HEENT: No blurry vision, dysphagia, or odynophagia  CVS: No CP/palpitations  Resp: +SOB; no wheezing  GI: No N/V/C/D/abdominal pain  MSK: No joint or muscle pain/swelling  Skin: No new rashes  Neuro: No headaches, Left sided weakness      Vital Signs Last 24 Hrs  T(C): 36.8 (19 Sep 2017 06:39), Max: 36.8 (19 Sep 2017 06:39)  T(F): 98.2 (19 Sep 2017 06:39), Max: 98.2 (19 Sep 2017 06:39)  HR: 90 (19 Sep 2017 06:39) (69 - 90)  BP: 107/74 (19 Sep 2017 06:39) (107/74 - 116/78)  BP(mean): --  RR: 18 (19 Sep 2017 06:39) (18 - 18)  SpO2: 99% (19 Sep 2017 06:39) (97% - 99%)    Physical Exam:  General: NAD, laying comfortably on bed.  HEENT: EOMI, MMM  Cardio: +S1/S2, RRR  Resp: +wheezing  GI: +BS, soft, NT/ND  MSK: Normal ROM, LUE and LLE str 3/5 (able to lift against gravity).  Neuro: AAOx3, CN II-XII intact.  Psych: wnl    LABS:                        12.8   5.27  )-----------( 118      ( 19 Sep 2017 06:30 )             39.9     09-19    143  |  101  |  22  ----------------------------<  102<H>  3.7   |  30  |  1.01    Ca    8.9      19 Sep 2017 06:30  Mg     1.9     09-18      PT/INR - ( 19 Sep 2017 06:30 )   PT: 13.4 SEC;   INR: 1.19          PTT - ( 19 Sep 2017 06:30 )  PTT:26.1 SEC    C3 115.2  C4 20.6    RADIOLOGY & ADDITIONAL TESTS:  < from: MRI Head w/wo Cont (09.19.17 @ 02:07) >  Impression: Abnormal T2 prolongation with increased signal on the   diffusion-weighted sequence is again identified in the supratentorial   region. These areas now demonstrate increased gyriform type of   enhancement. These could be compatible areas of subacute infarcts as well   as Press though can also be related to patient's known sarcoidosis.   Clinical correlation and continued close interval follow-up is   recommended.    < end of copied text >    < from: JONATAN w/o TTE (w/3D Echo) (09.18.17 @ 17:45) >  CONCLUSIONS:  1. Normal mitral valve. Minimal mitral regurgitation.  2. Normal trileaflet aortic valve. No aortic valve  regurgitation seen.  3. Normal aortic root, aortic arch and descending thoracic  aorta.  4. Normal left atrium.  No left atrial or left atrial  appendage thrombus.  5. Normal left ventricular systolic function. No segmental  wall motion abnormalities.  6. Right ventricular enlargement with decreased right  ventricular systolic function.  7. Agitated saline injection and color flow Doppler  demonstrate evidence of a large patent foramen ovale.    < end of copied text >

## 2017-09-19 NOTE — PROGRESS NOTE ADULT - ASSESSMENT
This is a 47 year old AAM with PMH significant for Sarcoidosis , HTN, COPD, Pneumothorax x 3 who presented with left leg spasms and contractions and was found to have new onset A.fib, PFO seen on JONATAN and multiple areas of infarction seen on MRI. Rheumatology consulted due to positive HELENE of 1:160 associated with no rashes, arthritis or uveitis.     1) Positive HELENE  - history of sarcoidosis, no other history of any autoimmune diseases  - denies arthritis, rashes, eye inflammation, blood clots in legs or lungs, red nodules on skin, morning stiffness, neck stiffness  - Physical: decreased strength over left side, no synovitis or joint tenderness, no rashes seen on skin or over shins( erythema nodosum)  - positive HELENE can be due to underlying viral, bacterial infections. 13 percent of people can have false positives in the population. If a family member has an autoimmune disease, another family member can incidentally have a positive HELENE with no symptomatology.  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     Discussed with Dr. Tsang, agrees with above plan.

## 2017-09-20 DIAGNOSIS — R04.2 HEMOPTYSIS: ICD-10-CM

## 2017-09-20 LAB
ACE SERPL-CCNC: 51 U/L — SIGNIFICANT CHANGE UP (ref 14–82)
APTT BLD: 28.6 SEC — SIGNIFICANT CHANGE UP (ref 27.5–37.4)
BASOPHILS # BLD AUTO: 0.02 K/UL — SIGNIFICANT CHANGE UP (ref 0–0.2)
BASOPHILS NFR BLD AUTO: 0.3 % — SIGNIFICANT CHANGE UP (ref 0–2)
BLD GP AB SCN SERPL QL: NEGATIVE — SIGNIFICANT CHANGE UP
DRVVT CONFIRM: 31.4 SEC — SIGNIFICANT CHANGE UP (ref 27–41)
DRVVT INTERPRETATION.: NEGATIVE — SIGNIFICANT CHANGE UP
DRVVT SCREEN TO CONFIRM RATIO: 0.99 — SIGNIFICANT CHANGE UP (ref 0–1.2)
DSDNA AB FLD-ACNC: 1 ZZ — HIGH
DSDNA AB SER-ACNC: <12 IU/ML — SIGNIFICANT CHANGE UP
ENA RNP IGG SER-ACNC: < 0.2 ZZ — SIGNIFICANT CHANGE UP
ENA SM AB TITR SER: < 0.2 ZZ — SIGNIFICANT CHANGE UP
ENA SS-A AB FLD IA-ACNC: <0.2 ZZ — SIGNIFICANT CHANGE UP
EOSINOPHIL # BLD AUTO: 0.12 K/UL — SIGNIFICANT CHANGE UP (ref 0–0.5)
EOSINOPHIL NFR BLD AUTO: 2.1 % — SIGNIFICANT CHANGE UP (ref 0–6)
HAV IGM SER-ACNC: NONREACTIVE — SIGNIFICANT CHANGE UP
HBV CORE IGM SER-ACNC: NONREACTIVE — SIGNIFICANT CHANGE UP
HBV SURFACE AG SER-ACNC: NONREACTIVE — SIGNIFICANT CHANGE UP
HCT VFR BLD CALC: 38.2 % — LOW (ref 39–50)
HCT VFR BLD CALC: 40.3 % — SIGNIFICANT CHANGE UP (ref 39–50)
HCV AB S/CO SERPL IA: 0.13 S/CO — SIGNIFICANT CHANGE UP
HCV AB SERPL-IMP: SIGNIFICANT CHANGE UP
HGB BLD-MCNC: 12.2 G/DL — LOW (ref 13–17)
HGB BLD-MCNC: 12.9 G/DL — LOW (ref 13–17)
IMM GRANULOCYTES # BLD AUTO: 0.03 # — SIGNIFICANT CHANGE UP
IMM GRANULOCYTES NFR BLD AUTO: 0.5 % — SIGNIFICANT CHANGE UP (ref 0–1.5)
INR BLD: 1.26 — HIGH (ref 0.88–1.17)
LYMPHOCYTES # BLD AUTO: 1.64 K/UL — SIGNIFICANT CHANGE UP (ref 1–3.3)
LYMPHOCYTES # BLD AUTO: 28.5 % — SIGNIFICANT CHANGE UP (ref 13–44)
MCHC RBC-ENTMCNC: 28.1 PG — SIGNIFICANT CHANGE UP (ref 27–34)
MCHC RBC-ENTMCNC: 28.2 PG — SIGNIFICANT CHANGE UP (ref 27–34)
MCHC RBC-ENTMCNC: 31.9 % — LOW (ref 32–36)
MCHC RBC-ENTMCNC: 32 % — SIGNIFICANT CHANGE UP (ref 32–36)
MCV RBC AUTO: 88 FL — SIGNIFICANT CHANGE UP (ref 80–100)
MCV RBC AUTO: 88 FL — SIGNIFICANT CHANGE UP (ref 80–100)
MONOCYTES # BLD AUTO: 0.86 K/UL — SIGNIFICANT CHANGE UP (ref 0–0.9)
MONOCYTES NFR BLD AUTO: 14.9 % — HIGH (ref 2–14)
NEUTROPHILS # BLD AUTO: 3.09 K/UL — SIGNIFICANT CHANGE UP (ref 1.8–7.4)
NEUTROPHILS NFR BLD AUTO: 53.7 % — SIGNIFICANT CHANGE UP (ref 43–77)
NORMALIZED SCT PPP-RTO: 0.79 — LOW (ref 0.81–1.17)
NORMALIZED SCT PPP-RTO: 31.4 SEC — LOW (ref 33.7–49.5)
NORMALIZED SCT PPP-RTO: NEGATIVE — SIGNIFICANT CHANGE UP
NRBC # FLD: 0 — SIGNIFICANT CHANGE UP
NRBC # FLD: 0 — SIGNIFICANT CHANGE UP
PLATELET # BLD AUTO: 116 K/UL — LOW (ref 150–400)
PLATELET # BLD AUTO: 125 K/UL — LOW (ref 150–400)
PMV BLD: 12.6 FL — SIGNIFICANT CHANGE UP (ref 7–13)
PMV BLD: 13.3 FL — HIGH (ref 7–13)
PROTHROM AB SERPL-ACNC: 14.2 SEC — HIGH (ref 9.8–13.1)
RBC # BLD: 4.34 M/UL — SIGNIFICANT CHANGE UP (ref 4.2–5.8)
RBC # BLD: 4.58 M/UL — SIGNIFICANT CHANGE UP (ref 4.2–5.8)
RBC # FLD: 12.8 % — SIGNIFICANT CHANGE UP (ref 10.3–14.5)
RBC # FLD: 13 % — SIGNIFICANT CHANGE UP (ref 10.3–14.5)
RH IG SCN BLD-IMP: POSITIVE — SIGNIFICANT CHANGE UP
WBC # BLD: 5.63 K/UL — SIGNIFICANT CHANGE UP (ref 3.8–10.5)
WBC # BLD: 5.76 K/UL — SIGNIFICANT CHANGE UP (ref 3.8–10.5)
WBC # FLD AUTO: 5.63 K/UL — SIGNIFICANT CHANGE UP (ref 3.8–10.5)
WBC # FLD AUTO: 5.76 K/UL — SIGNIFICANT CHANGE UP (ref 3.8–10.5)

## 2017-09-20 PROCEDURE — 99233 SBSQ HOSP IP/OBS HIGH 50: CPT | Mod: GC

## 2017-09-20 PROCEDURE — 71010: CPT | Mod: 26

## 2017-09-20 PROCEDURE — 99233 SBSQ HOSP IP/OBS HIGH 50: CPT

## 2017-09-20 PROCEDURE — 99232 SBSQ HOSP IP/OBS MODERATE 35: CPT | Mod: GC

## 2017-09-20 RX ADMIN — CHLORHEXIDINE GLUCONATE 1 APPLICATION(S): 213 SOLUTION TOPICAL at 11:43

## 2017-09-20 RX ADMIN — APIXABAN 5 MILLIGRAM(S): 2.5 TABLET, FILM COATED ORAL at 07:01

## 2017-09-20 RX ADMIN — PANTOPRAZOLE SODIUM 40 MILLIGRAM(S): 20 TABLET, DELAYED RELEASE ORAL at 06:05

## 2017-09-20 RX ADMIN — Medication 40 MILLIGRAM(S): at 11:43

## 2017-09-20 RX ADMIN — DIVALPROEX SODIUM 500 MILLIGRAM(S): 500 TABLET, DELAYED RELEASE ORAL at 06:05

## 2017-09-20 RX ADMIN — DIVALPROEX SODIUM 500 MILLIGRAM(S): 500 TABLET, DELAYED RELEASE ORAL at 17:02

## 2017-09-20 RX ADMIN — Medication 81 MILLIGRAM(S): at 11:43

## 2017-09-20 RX ADMIN — Medication 3 MILLILITER(S): at 03:26

## 2017-09-20 NOTE — CONSULT NOTE ADULT - ASSESSMENT
47M with a PMH of sarcoidosis, HTN, COPD, and asthma complicated by pneumothoraces x3 who presented to the ED with severe leg spasms on 9/10, found to have new CVA on MRI as well as patent PFO and possible paroxysmal afib. s/p 1 episode of hematemesis after starting NOAC. 47M with a PMH of sarcoidosis, HTN, COPD, and asthma complicated by pneumothoraces x3 who presented to the ED with severe leg spasms on 9/10, found to have new CVA on MRI as well as patent PFO and possible paroxysmal afib. s/p 1 episode of hematemesis after starting NOAC.     #hematemesis likely 2/2 sarcoidosis   - appears to have subsided now and patient has no increased O2 requirements   - no need for ICU level of care at this time  - continue to monitor for hematemesis on floor  - defer a/c to Cardiology, but would recommend a reversible for of a/c if a/c is decided to be done   - please reconsult if hematemesis reoccurs    David Farley PGY2  [phone] / [phone]

## 2017-09-20 NOTE — PROGRESS NOTE ADULT - SUBJECTIVE AND OBJECTIVE BOX
Patient is a 47y old  Male who presents with a chief complaint of "My leg wouldn't stop shaking." (14 Sep 2017 09:59)      SUBJECTIVE / OVERNIGHT EVENTS:    MEDICATIONS  (STANDING):  atorvastatin 80 milliGRAM(s) Oral at bedtime  chlorhexidine 4% Liquid 1 Application(s) Topical daily  aspirin  chewable 81 milliGRAM(s) Oral daily  diVALproex  milliGRAM(s) Oral two times a day  buDESOnide  80 MICROgram(s)/formoterol 4.5 MICROgram(s) Inhaler 2 Puff(s) Inhalation two times a day  polyethylene glycol 3350 17 Gram(s) Oral at bedtime  pantoprazole    Tablet 40 milliGRAM(s) Oral before breakfast  ALBUTerol/ipratropium for Nebulization 3 milliLiter(s) Nebulizer every 6 hours  apixaban 5 milliGRAM(s) Oral every 12 hours    MEDICATIONS  (PRN):  acetaminophen    Suspension 650 milliGRAM(s) Oral every 6 hours PRN For Temp greater than 38 C (100.4 F)  ALBUTerol/ipratropium for Nebulization 3 milliLiter(s) Nebulizer every 6 hours PRN Shortness of Breath and/or Wheezing  aluminum hydroxide/magnesium hydroxide/simethicone Suspension 30 milliLiter(s) Oral every 6 hours PRN Dyspepsia        CAPILLARY BLOOD GLUCOSE        I&O's Summary      T(C): 36.9 (09-20-17 @ 06:04), Max: 36.9 (09-20-17 @ 06:04)  HR: 113 (09-20-17 @ 08:30) (90 - 113)  BP: 148/104 (09-20-17 @ 08:30) (110/70 - 148/104)  RR: 23 (09-20-17 @ 08:30) (17 - 23)  SpO2: 95% (09-20-17 @ 08:30) (95% - 97%)    PHYSICAL EXAM:  GENERAL: NAD, well-developed  HEAD:  Atraumatic, Normocephalic  EYES: EOMI, PERRLA, conjunctiva and sclera clear  NECK: Supple, No JVD  CHEST/LUNG: Clear to auscultation bilaterally; No wheeze  HEART: Regular rate and rhythm; No murmurs, rubs, or gallops  ABDOMEN: Soft, Nontender, Nondistended; Bowel sounds present  EXTREMITIES:  2+ Peripheral Pulses, No clubbing, cyanosis, or edema  PSYCH: AAOx3  NEUROLOGY: non-focal  SKIN: No rashes or lesions    LABS:                        12.2   5.63  )-----------( 116      ( 20 Sep 2017 06:35 )             38.2     09-19    143  |  101  |  22  ----------------------------<  102<H>  3.7   |  30  |  1.01    Ca    8.9      19 Sep 2017 06:30      PT/INR - ( 19 Sep 2017 06:30 )   PT: 13.4 SEC;   INR: 1.19          PTT - ( 19 Sep 2017 06:30 )  PTT:26.1 SEC          RADIOLOGY & ADDITIONAL TESTS:    Imaging Personally Reviewed:    Consultant(s) Notes Reviewed:      Care Discussed with Consultants/Other Providers: Patient is a 47y old  Male who presents with a chief complaint of "My leg wouldn't stop shaking." (14 Sep 2017 09:59)      SUBJECTIVE / OVERNIGHT EVENTS: Pt had a episode of coughing with 1/2 cup of Bright red blood at bedside. Derick ST. Started on eliquis yesterday for afib on EKG from 9/12    MEDICATIONS  (STANDING):  atorvastatin 80 milliGRAM(s) Oral at bedtime  chlorhexidine 4% Liquid 1 Application(s) Topical daily  aspirin  chewable 81 milliGRAM(s) Oral daily  diVALproex  milliGRAM(s) Oral two times a day  buDESOnide  80 MICROgram(s)/formoterol 4.5 MICROgram(s) Inhaler 2 Puff(s) Inhalation two times a day  polyethylene glycol 3350 17 Gram(s) Oral at bedtime  pantoprazole    Tablet 40 milliGRAM(s) Oral before breakfast  ALBUTerol/ipratropium for Nebulization 3 milliLiter(s) Nebulizer every 6 hours  apixaban 5 milliGRAM(s) Oral every 12 hours    MEDICATIONS  (PRN):  acetaminophen    Suspension 650 milliGRAM(s) Oral every 6 hours PRN For Temp greater than 38 C (100.4 F)  ALBUTerol/ipratropium for Nebulization 3 milliLiter(s) Nebulizer every 6 hours PRN Shortness of Breath and/or Wheezing  aluminum hydroxide/magnesium hydroxide/simethicone Suspension 30 milliLiter(s) Oral every 6 hours PRN Dyspepsia        CAPILLARY BLOOD GLUCOSE        I&O's Summary      T(C): 36.9 (09-20-17 @ 06:04), Max: 36.9 (09-20-17 @ 06:04)  HR: 113 (09-20-17 @ 08:30) (90 - 113)  BP: 148/104 (09-20-17 @ 08:30) (110/70 - 148/104)  RR: 23 (09-20-17 @ 08:30) (17 - 23)  SpO2: 95% (09-20-17 @ 08:30) (95% - 97%)    PHYSICAL EXAM:  GENERAL: NAD, well-developed  HEAD:  Atraumatic, Normocephalic  EYES: EOMI, PERRLA, conjunctiva and sclera clear  NECK: Supple, No JVD  CHEST/LUNG: +transmitted BS rhonci +prolonged exp phase  HEART: Regular rate and rhythm; No murmurs, rubs, or gallops  ABDOMEN: Soft, Nontender, Nondistended; Bowel sounds present  EXTREMITIES:  2+ Peripheral Pulses, No clubbing, cyanosis, or edema  PSYCH: AAOx3  NEUROLOGY: LT UE 3/5 LT LE 4/5   SKIN: No rashes or lesions    LABS:                        12.2   5.63  )-----------( 116      ( 20 Sep 2017 06:35 )             38.2     09-19    143  |  101  |  22  ----------------------------<  102<H>  3.7   |  30  |  1.01    Ca    8.9      19 Sep 2017 06:30      PT/INR - ( 19 Sep 2017 06:30 )   PT: 13.4 SEC;   INR: 1.19          PTT - ( 19 Sep 2017 06:30 )  PTT:26.1 SEC          RADIOLOGY & ADDITIONAL TESTS:    Imaging Personally Reviewed:    Consultant(s) Notes Reviewed:      Care Discussed with Consultants/Other Providers:

## 2017-09-20 NOTE — PROGRESS NOTE ADULT - ASSESSMENT
This is a 47 year old AAM with PMH significant for Sarcoidosis , HTN, COPD, Pneumothorax x 3 who presented with left leg spasms and contractions and was found to have new onset A.fib, PFO seen on JONATAN and multiple areas of infarction seen on MRI. Rheumatology consulted due to positive HELENE of 1:160 associated with no rashes, arthritis or uveitis.     1) Positive HELENE  - history of sarcoidosis, no other history of any autoimmune diseases  - denies arthritis, rashes, eye inflammation, blood clots in legs or lungs, red nodules on skin, morning stiffness, neck stiffness  - Physical: decreased strength over left side, no synovitis or joint tenderness, no rashes seen on skin or over shins( erythema nodosum)  - positive HELENE can be due to underlying viral, bacterial infections. 13 percent of people can have false positives in the population. If a family member has an autoimmune disease, another family member can incidentally have a positive HELENE with no symptomatology.  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     Discussed with Dr. Tsang, agrees with above plan. This is a 47 year old AAM with PMH significant for Sarcoidosis , HTN, COPD, Pneumothorax x 3 who presented with left leg spasms and contractions and was found to have a large PFO on JONATAN and multiple areas of infarction seen on MRI. Rheumatology consulted due to positive HELENE of 1:160 w/o associated with no rashes, arthritis or uveitis.     1) Positive HELENE  - history of sarcoidosis, no other history of autoimmune diseases  - denies arthritis, rashes, eye inflammation, blood clots in legs or lungs, red nodules on skin, morning stiffness, neck stiffness  - Physical: decreased strength over left side, no synovitis or joint tenderness, no rashes seen on skin or over shins( erythema nodosum)  - positive HELENE can be due to underlying viral, bacterial infections. 13 percent of people can have false positives in the population. If a family member has an autoimmune disease, another family member can incidentally have a positive HELENE with no symptomatology.  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 Welsh.  - patient has history of being on chronic steroids  - Normal 1,25 vit D. Low 25 vit D  - f/u ACE level, Hepatitis panel, Quant Gold.  - consider cardiac MRI to evaluate for cardiac involvement from sarcoidosis especially in the setting of new onset arrhythmia.     Discussed with Dr. Tsang, agrees with above plan. This is a 47 year old AAM with PMH significant for Sarcoidosis , HTN, COPD, Pneumothorax x 3 who presented with left leg spasms and contractions and was found to have a large PFO on JONATAN and multiple areas of infarction seen on MRI. Rheumatology consulted due to positive HELENE of 1:160 w/o associated with no rashes, arthritis or uveitis.     1) Positive HELENE  - history of sarcoidosis, no other history of autoimmune diseases  - denies arthritis, rashes, eye inflammation, blood clots in legs or lungs, red nodules on skin, morning stiffness, neck stiffness  - Physical: decreased strength over left side, no synovitis or joint tenderness, no rashes seen on skin or over shins( erythema nodosum)  - positive HELENE can be due to underlying viral, bacterial infections. 13 percent of people can have false positives in the population. If a family member has an autoimmune disease, another family member can incidentally have a positive HELENE with no symptomatology.  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 Welsh.  - patient has history of being on chronic steroids  - Normal 1,25 vit D. Low 25 vit D  - f/u ACE level, Hepatitis panel, Quant Gold.  - consider cardiac MRI to evaluate for cardiac involvement from sarcoidosis especially in the setting of new onset arrhythmia.     Discussed with Dr. Tsang, agrees with above plan. This is a 47 year old AAM with PMH significant for Sarcoidosis , HTN, COPD, Pneumothorax x 3 who presented with left leg spasms and contractions and was found to have a large PFO on JONATAN and multiple areas of infarction seen on MRI. Rheumatology consulted due to positive HELENE of 1:160 w/o associated with no rashes, arthritis or uveitis.     1) Positive HELENE  - history of sarcoidosis, no other history of autoimmune diseases  - denies arthritis, rashes, eye inflammation, blood clots in legs or lungs, red nodules on skin, morning stiffness, neck stiffness  - Physical: decreased strength over left side, no synovitis or joint tenderness, no rashes seen on skin or over shins( erythema nodosum)  - positive HELENE can be due to underlying viral, bacterial infections. 13 percent of people can have false positives in the population. If a family member has an autoimmune disease, another family member can incidentally have a positive HELENE with no symptomatology.  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 Welsh.  - patient has history of being on chronic steroids  - Normal 1,25 vit D. Low 25 vit D  - f/u ACE level, Hepatitis panel, Quant Gold.  - consider cardiac MRI to evaluate for cardiac involvement from sarcoidosis    Discussed with Dr. Tsang, agrees with above plan. This is a 47 year old AAM with PMH significant for Sarcoidosis , HTN, COPD, Pneumothorax x 3 who presented with left leg spasms and contractions and was found to have a large PFO on JONATAN and multiple areas of infarction seen on MRI. Rheumatology consulted due to positive HELENE of 1:160 w/o associated with no rashes, arthritis or uveitis.     1) Positive HELENE  - history of sarcoidosis, no other history of autoimmune diseases  - denies arthritis, rashes, eye inflammation, blood clots in legs or lungs, red nodules on skin, morning stiffness, neck stiffness  - Physical: decreased strength over left side, no synovitis or joint tenderness, no rashes seen on skin or over shins( erythema nodosum)  - positive HELENE can be due to underlying viral, bacterial infections. 13 percent of people can have false positives in the population. If a family member has an autoimmune disease, another family member can incidentally have a positive HELENE with no symptomatology.  - C3, C4, Wade, RNP,dsDNA, Cardiolipin IgG and IgM, SSA all wnl  - SSB is positive at 1.0 of unclear significance  - f/u urine protein, urine creatinine, GBM antibodies  - will continue to follow    2) Sarcoidosis:  - per Pt, biopsy confirmed diagnosis in 2006 at [facility] and is seen regularly at [facility] 9/11 responder program by Pulmonologist Dr. Welsh.  - patient has history of being on chronic steroids  - Normal 1,25 vit D. Low 25 vit D  - f/u ACE level, Hepatitis panel, Quant Gold.  - consider cardiac MRI to evaluate for cardiac involvement from sarcoidosis    Discussed with Dr. Tsang, agrees with above plan.

## 2017-09-20 NOTE — PROGRESS NOTE ADULT - PROBLEM SELECTOR PLAN 1
had hemoptysis today , half a cup red in color: yesterday s=he was started on eliquis and he has been coughing also a lot: He also hemotysied before and he probably had pulmonary arterial embolization in the left upper lobe artery: He was told if he hemoptysize again, he may need surgery: MICU called and thoracic surgery called too: I h ave spoke to attending myself. Will stop eliquis for now: and monitor:

## 2017-09-20 NOTE — PROGRESS NOTE ADULT - PROBLEM SELECTOR PLAN 5
-likely excerbated by URI  -prednisone d/c'ed Feliz ATC  pulm rec appreciated. -likely excerbated by URI  -prednisone d/c'ed Feliz ATC

## 2017-09-20 NOTE — CONSULT NOTE ADULT - SUBJECTIVE AND OBJECTIVE BOX
CHIEF COMPLAINT:    HPI:  47M with a PMH of sarcoidosis, HTN, COPD, and asthma complicated by pneumothoraces x3 who presented to the ED with severe leg spasms on 9/10.  Patient had code stroke called and was transferred to MICU and then intubated for airway protection.   Patient was found to have a new infarct on MRI and also found to have a patent PFO on this admission as well.   Patient able to be weaned off vent and transferred to the floors and has remained hemodynamically stable.   There was concern patient was having paroxysmal afib, so he was started on Eliquis yesterday evening. He has received 2 doses of Eliquis so far.     This morning patient had a coughing episode, which was not unusual for him, but during this episode he began to cough up fresh blood. He fill about 1/2 cup of blood during this episode and has not produced anymore since.   Patient reports he has similar episodes of hematemesis in the past, last about 1 year ago, but reports them as sporadic episodes that are self resolving.     PAST MEDICAL & SURGICAL HISTORY:  History of pneumothorax: History of 3 prior pneumonthoracies.  COPD (chronic obstructive pulmonary disease)  Asthma  Hypertension  Sarcoidosis  No significant past surgical history      FAMILY HISTORY:  Family history of pancreatic cancer (Father)  Family history of essential hypertension (Father)        Allergies    No Known Allergies    Intolerances        HOME MEDICATIONS:    REVIEW OF SYSTEMS:  Constitutional: feels well  Eyes:  ENT:  CV:  Resp: no SOB on NC   GI:  :  MSK:  Integumentary:  Neurological: unable to move L arm and mild L leg weakness   Psychiatric:  Endocrine:  Hematologic/Lymphatic:  Allergic/Immunologic:  [ X] All other systems negative  [ ] Unable to assess ROS because ________    OBJECTIVE:  ICU Vital Signs Last 24 Hrs  T(C): 36.9 (20 Sep 2017 06:04), Max: 36.9 (20 Sep 2017 06:04)  T(F): 98.5 (20 Sep 2017 06:04), Max: 98.5 (20 Sep 2017 06:04)  HR: 106 (20 Sep 2017 09:37) (90 - 113)  BP: 115/83 (20 Sep 2017 09:37) (110/70 - 148/104)  BP(mean): --  ABP: --  ABP(mean): --  RR: 23 (20 Sep 2017 08:30) (17 - 23)  SpO2: 95% (20 Sep 2017 08:30) (95% - 97%)      PHYSICAL EXAM:    GENERAL: Comfortable, no acute distress   HEAD:  Normocephalic, atraumatic  EYES: EOMI, PERRLA  HEENT: Moist mucous membranes  NECK: Supple, No JVD  NERVOUS SYSTEM:  Alert & Oriented X3, Motor Strength 5/5 R upper and lower extremities, 1/5 strength in LUE, 4/5 in LLE, mild R sides facial droop  CHEST/LUNG: bilateral diffuse wheezes   HEART: Regular rate and rhythm, no murmur   ABDOMEN: Soft, Nontender, Nondistended, Bowel sounds present  EXTREMITIES:   No clubbing, cyanosis, or edema  MUSCULOSKELETAL: No muscle tenderness, no joint tenderness  SKIN:  warm and dry, no rash         HOSPITAL MEDICATIONS:  MEDICATIONS  (STANDING):  atorvastatin 80 milliGRAM(s) Oral at bedtime  chlorhexidine 4% Liquid 1 Application(s) Topical daily  aspirin  chewable 81 milliGRAM(s) Oral daily  diVALproex  milliGRAM(s) Oral two times a day  buDESOnide  80 MICROgram(s)/formoterol 4.5 MICROgram(s) Inhaler 2 Puff(s) Inhalation two times a day  polyethylene glycol 3350 17 Gram(s) Oral at bedtime  pantoprazole    Tablet 40 milliGRAM(s) Oral before breakfast  ALBUTerol/ipratropium for Nebulization 3 milliLiter(s) Nebulizer every 6 hours    MEDICATIONS  (PRN):  acetaminophen    Suspension 650 milliGRAM(s) Oral every 6 hours PRN For Temp greater than 38 C (100.4 F)  ALBUTerol/ipratropium for Nebulization 3 milliLiter(s) Nebulizer every 6 hours PRN Shortness of Breath and/or Wheezing  aluminum hydroxide/magnesium hydroxide/simethicone Suspension 30 milliLiter(s) Oral every 6 hours PRN Dyspepsia      LABS:                        12.9   5.76  )-----------( 125      ( 20 Sep 2017 09:00 )             40.3     09-19    143  |  101  |  22  ----------------------------<  102<H>  3.7   |  30  |  1.01    Ca    8.9      19 Sep 2017 06:30      PT/INR - ( 20 Sep 2017 09:00 )   PT: 14.2 SEC;   INR: 1.26          PTT - ( 20 Sep 2017 09:00 )  PTT:28.6 SEC          MICROBIOLOGY:     RADIOLOGY:  [X ] Reviewed and interpreted by me  CXR- 9/20 appear unchanged from prior studies     < from: MRI Head w/wo Cont (09.19.17 @ 02:07) >  Impression: Abnormal T2 prolongation with increased signal on the   diffusion-weighted sequence is again identified in the supratentorial   region. These areas now demonstrate increased gyriform type of   enhancement. These could be compatible areas of subacute infarcts as well   as Press though can also be related to patient's known sarcoidosis.   Clinical correlation and continued close interval follow-up is   recommended.    < end of copied text >      EKG:

## 2017-09-20 NOTE — PROGRESS NOTE ADULT - PROBLEM SELECTOR PLAN 2
WBC normal currently, d/c abx. URI vs +HELENE rheumatology consult-possibly secondary to URI f/u serology bilateral MCA and right PCA infarcts (R>L) in the frontoparietal and occipital areas which could be cardioembolic in source.  JONATAN-+PFO closure as outpt W/ Dr. Dominguez  -EP reviewed EKG on 9/12 afib  -will review MRI with neurology   ASA 81 mg PO QD  Lipitor 80 mg PO QHS   FwC4c-0.8 LDL-113  Tele monitor  PT/OT eval-PMR acute rehab

## 2017-09-20 NOTE — PROGRESS NOTE ADULT - ASSESSMENT
47 year old -American male initially presenting with LLE spasm. Hospital course complicated by an episode of unresponsiveness and gaze deviation concerning for seizure vs stroke. He was subsequently intubated for airway protection and extubated. Post extubation, mental status improved, though exam notable for L-sided weakness. EEG showed burst suppression pattern while on sedation.    MRI brain showed bilateral MCA and right PCA infarcts (R>L) in the frontoparietal and occipital areas which could be cardioembolic in source.  MRA head/neck is grossly unremarkable although limited by motion.  TTE done at bedside by ICU team showed positive bubble study with normal EF.  LE doppler negative for DVT bilaterally 47 year old -American male initially presenting with LLE spasm. Hospital course complicated by an episode of unresponsiveness and gaze deviation concerning for seizure vs stroke. He was subsequently intubated for airway protection and extubated. Post extubation, mental status improved, though exam notable for L-sided weakness. EEG showed burst suppression pattern while on sedation. +URI-enterovirus    MRI brain showed bilateral MCA and right PCA infarcts (R>L) in the frontoparietal and occipital areas which could be cardioembolic in source.  MRA head/neck is grossly unremarkable although limited by motion.  TTE done at bedside by ICU team showed positive bubble study with normal EF.  LE doppler negative for DVT bilaterally

## 2017-09-20 NOTE — PROGRESS NOTE ADULT - SUBJECTIVE AND OBJECTIVE BOX
OSCAR CHACKO  [id number]    INTERVAL HPI/OVERNIGHT EVENTS: Patient with an episode of hemoptysis this AM where he produced about 1/2 cup of bright red blood. Pt states that he has had episodes of hemoptysis before stemming from his pulmonary sarcoidosis. He stated that he had some type of ablative procedure via the R femoral artery in 2007 after an episode of hemoptysis after which he did not have recurrence until 2011. Since 2011, he has been having an episode about every 1-2 years that are self resolving, with the most recent episode occuring appx 1 year ago. Of note, he was started on Apixaban 5mg yesterday and hadreceived 2 doses.    MEDICATIONS  (STANDING):  atorvastatin 80 milliGRAM(s) Oral at bedtime  chlorhexidine 4% Liquid 1 Application(s) Topical daily  aspirin  chewable 81 milliGRAM(s) Oral daily  diVALproex  milliGRAM(s) Oral two times a day  buDESOnide  80 MICROgram(s)/formoterol 4.5 MICROgram(s) Inhaler 2 Puff(s) Inhalation two times a day  polyethylene glycol 3350 17 Gram(s) Oral at bedtime  pantoprazole    Tablet 40 milliGRAM(s) Oral before breakfast  ALBUTerol/ipratropium for Nebulization 3 milliLiter(s) Nebulizer every 6 hours  predniSONE   Tablet 40 milliGRAM(s) Oral daily    MEDICATIONS  (PRN):  acetaminophen    Suspension 650 milliGRAM(s) Oral every 6 hours PRN For Temp greater than 38 C (100.4 F)  ALBUTerol/ipratropium for Nebulization 3 milliLiter(s) Nebulizer every 6 hours PRN Shortness of Breath and/or Wheezing  aluminum hydroxide/magnesium hydroxide/simethicone Suspension 30 milliLiter(s) Oral every 6 hours PRN Dyspepsia      Allergies    No Known Allergies    Intolerances        Review of Systems:   General: No fevers/chills, no fatigue  HEENT: No blurry vision, dysphagia, or odynophagia  CVS: No CP/palpitations  Resp: +hemoptysis, +SOB; no wheezing  GI: No N/V/C/D/abdominal pain  MSK: No joint or muscle pain/swelling  Skin: No new rashes  Neuro: No headaches, Left sided weakness        Vital Signs Last 24 Hrs  T(C): 36.9 (20 Sep 2017 06:04), Max: 36.9 (20 Sep 2017 06:04)  T(F): 98.5 (20 Sep 2017 06:04), Max: 98.5 (20 Sep 2017 06:04)  HR: 106 (20 Sep 2017 09:37) (90 - 113)  BP: 115/83 (20 Sep 2017 09:37) (115/83 - 148/104)  BP(mean): --  RR: 23 (20 Sep 2017 08:30) (18 - 23)  SpO2: 95% (20 Sep 2017 08:30) (95% - 97%)    Physical Exam:  General: NAD, laying comfortably on bed.  HEENT: EOMI, MMM  Cardio: +S1/S2, RRR  Resp: +wheezing  GI: +BS, soft, NT/ND  MSK: Normal ROM, LUE and LLE str 3/5 (able to lift against gravity).  Neuro: AAOx3, CN II-XII intact.  Psych: wnl    LABS:                        12.9   5.76  )-----------( 125      ( 20 Sep 2017 09:00 )             40.3     09-19    143  |  101  |  22  ----------------------------<  102<H>  3.7   |  30  |  1.01    Ca    8.9      19 Sep 2017 06:30      PT/INR - ( 20 Sep 2017 09:00 )   PT: 14.2 SEC;   INR: 1.26          PTT - ( 20 Sep 2017 09:00 )  PTT:28.6 SEC        RADIOLOGY & ADDITIONAL TESTS: OSCAR CHCAKO  [id number]    INTERVAL HPI/OVERNIGHT EVENTS: Patient with an episode of hemoptysis this AM where he produced about 1/2 cup of bright red blood. Pt states that he has had episodes of hemoptysis before stemming from his pulmonary sarcoidosis. He stated that he had some type of ablative procedure via the R femoral artery in 2007 after an episode of hemoptysis after which he did not have recurrence until 2011. Since 2011, he has been having an episode about every 1-2 years that are self resolving, with the most recent episode occuring appx 1 year ago. Of note, he was started on Apixaban 5mg yesterday and hadreceived 2 doses.    MEDICATIONS  (STANDING):  atorvastatin 80 milliGRAM(s) Oral at bedtime  chlorhexidine 4% Liquid 1 Application(s) Topical daily  aspirin  chewable 81 milliGRAM(s) Oral daily  diVALproex  milliGRAM(s) Oral two times a day  buDESOnide  80 MICROgram(s)/formoterol 4.5 MICROgram(s) Inhaler 2 Puff(s) Inhalation two times a day  polyethylene glycol 3350 17 Gram(s) Oral at bedtime  pantoprazole    Tablet 40 milliGRAM(s) Oral before breakfast  ALBUTerol/ipratropium for Nebulization 3 milliLiter(s) Nebulizer every 6 hours  predniSONE   Tablet 40 milliGRAM(s) Oral daily    MEDICATIONS  (PRN):  acetaminophen    Suspension 650 milliGRAM(s) Oral every 6 hours PRN For Temp greater than 38 C (100.4 F)  ALBUTerol/ipratropium for Nebulization 3 milliLiter(s) Nebulizer every 6 hours PRN Shortness of Breath and/or Wheezing  aluminum hydroxide/magnesium hydroxide/simethicone Suspension 30 milliLiter(s) Oral every 6 hours PRN Dyspepsia      Allergies    No Known Allergies    Intolerances        Review of Systems:   General: No fevers/chills, no fatigue  HEENT: No blurry vision, dysphagia, or odynophagia  CVS: No CP/palpitations  Resp: +hemoptysis, +SOB; no wheezing  GI: No N/V/C/D/abdominal pain  MSK: No joint or muscle pain/swelling  Skin: No new rashes  Neuro: No headaches, Left sided weakness        Vital Signs Last 24 Hrs  T(C): 36.9 (20 Sep 2017 06:04), Max: 36.9 (20 Sep 2017 06:04)  T(F): 98.5 (20 Sep 2017 06:04), Max: 98.5 (20 Sep 2017 06:04)  HR: 106 (20 Sep 2017 09:37) (90 - 113)  BP: 115/83 (20 Sep 2017 09:37) (115/83 - 148/104)  BP(mean): --  RR: 23 (20 Sep 2017 08:30) (18 - 23)  SpO2: 95% (20 Sep 2017 08:30) (95% - 97%)    Physical Exam:  General: NAD, laying comfortably on bed.  HEENT: EOMI, MMM  Cardio: +S1/S2, RRR  Resp: +wheezing  GI: +BS, soft, NT/ND  MSK: Normal ROM, LUE str 3/5 (able to lift against gravity); LLE str 4/5.  Neuro: AAOx3, CN II-XII intact.  Psych: wnl    LABS:                        12.9   5.76  )-----------( 125      ( 20 Sep 2017 09:00 )             40.3     09-19    143  |  101  |  22  ----------------------------<  102<H>  3.7   |  30  |  1.01    Ca    8.9      19 Sep 2017 06:30      PT/INR - ( 20 Sep 2017 09:00 )   PT: 14.2 SEC;   INR: 1.26          PTT - ( 20 Sep 2017 09:00 )  PTT:28.6 SEC        RADIOLOGY & ADDITIONAL TESTS:

## 2017-09-20 NOTE — PROGRESS NOTE ADULT - SUBJECTIVE AND OBJECTIVE BOX
Date of Admission:    24H hour events:  Overnight, patient had episode of hemoptysis (1/2 cup full) He reports that he was coughing and then started coughing up blood. He reports similar episode approximately 1 year ago. No recurrent episodes since this morning at 3am.     MEDICATIONS:  aspirin  chewable 81 milliGRAM(s) Oral daily      ALBUTerol/ipratropium for Nebulization 3 milliLiter(s) Nebulizer every 6 hours PRN  buDESOnide  80 MICROgram(s)/formoterol 4.5 MICROgram(s) Inhaler 2 Puff(s) Inhalation two times a day  ALBUTerol/ipratropium for Nebulization 3 milliLiter(s) Nebulizer every 6 hours    acetaminophen    Suspension 650 milliGRAM(s) Oral every 6 hours PRN  diVALproex  milliGRAM(s) Oral two times a day    polyethylene glycol 3350 17 Gram(s) Oral at bedtime  aluminum hydroxide/magnesium hydroxide/simethicone Suspension 30 milliLiter(s) Oral every 6 hours PRN  pantoprazole    Tablet 40 milliGRAM(s) Oral before breakfast    atorvastatin 80 milliGRAM(s) Oral at bedtime    chlorhexidine 4% Liquid 1 Application(s) Topical daily      REVIEW OF SYSTEMS:  Complete 10point ROS negative.    PHYSICAL EXAM:  T(C): 36.9 (09-20-17 @ 06:04), Max: 36.9 (09-20-17 @ 06:04)  HR: 106 (09-20-17 @ 09:37) (90 - 113)  BP: 115/83 (09-20-17 @ 09:37) (110/70 - 148/104)  RR: 23 (09-20-17 @ 08:30) (17 - 23)  SpO2: 95% (09-20-17 @ 08:30) (95% - 97%)  Wt(kg): --  I&O's Summary      Appearance: Comfortable appearing, pleasant   HEENT:   MMM OP clear   Cardiovascular: Normal S1 S2, No JVD,No edema  Respiratory: Lungs clear to auscultation	  Psychiatry: A & O x 3, Mood & affect appropriate  Gastrointestinal:  Soft, Non-tender, + BS	  Skin: No rashes, No ecchymoses, No cyanosis	  Extremities:  No clubbing, cyanosis or edema  Vascular: Peripheral pulses palpable 2+ bilaterally        LABS:	 	    CBC Full  -  ( 20 Sep 2017 09:00 )  WBC Count : 5.76 K/uL  Hemoglobin : 12.9 g/dL  Hematocrit : 40.3 %  Platelet Count - Automated : 125 K/uL  Mean Cell Volume : 88.0 fL  Mean Cell Hemoglobin : 28.2 pg  Mean Cell Hemoglobin Concentration : 32.0 %  Auto Neutrophil # : 3.09 K/uL  Auto Lymphocyte # : 1.64 K/uL  Auto Monocyte # : 0.86 K/uL  Auto Eosinophil # : 0.12 K/uL  Auto Basophil # : 0.02 K/uL  Auto Neutrophil % : 53.7 %  Auto Lymphocyte % : 28.5 %  Auto Monocyte % : 14.9 %  Auto Eosinophil % : 2.1 %  Auto Basophil % : 0.3 %    09-19    143  |  101  |  22  ----------------------------<  102<H>  3.7   |  30  |  1.01    Ca    8.9      19 Sep 2017 06:30        TELEMETRY: 	 ST 130s, PVCs     PREVIOUS DIAGNOSTIC TESTING:    [ ] Echocardiogram: < from: JONATAN w/o TTE (w/3D Echo) (09.18.17 @ 17:45) >  CONCLUSIONS:  1. Normal mitral valve. Minimal mitral regurgitation.  2. Normal trileaflet aortic valve. No aortic valve  regurgitation seen.  3. Normal aortic root, aortic arch and descending thoracic  aorta.  4. Normal left atrium.  No left atrial or left atrial  appendage thrombus.  5. Normal left ventricular systolic function. No segmental  wall motion abnormalities.  6. Right ventricular enlargement with decreased right  ventricular systolic function.  7. Agitated saline injection and color flow Doppler  demonstrate evidence of a large patent foramen ovale.    	  ASSESSMENT/PLAN:     47M w/HTN, COPD, asthma, sarcoidosis with hx of pneumothoracies cardiology consulted for EKG changes course complicated by seizure activity in setting of R MCA and PCA infarcts now found to have large PFO on JONATAN w/normal LV function.    #R. MCA and PCA infarcts with large PFO seen on JONATAN 9/18. No LV thrombus seen on JONATAN. Tele Reviewed for the last 24hrs with Sinus tach No noted afib.  Sunrise uploaded ECG all NSR; However 9/12 EKG in chart with AFib  - Given paroxysmal Afib and CVA, AC was started however overnight patient had significant episode of hemoptysis. Given that this is a young patient with indication for AC, would rule out any upper airway source of bleeding that could be intervened on possibly by ENT that would allow us to continue AC in this patient. Okay to hold AC for now.   - Pulm evaluating at bedside on my assessment, will f/u recs  -Outpatient referral to Dr. Jamie Dominguez at [facility] for evaluation of PFO closure with Amplatzer device   - c/w ASA and lipitor 	  - Will continue to follow    [id number]

## 2017-09-20 NOTE — PROGRESS NOTE ADULT - SUBJECTIVE AND OBJECTIVE BOX
HPI:  Patient is a 47 year old male with a PMHx of sarcoidosis, HTN, COPD, and asthma who presents to the ED with severe leg spasms. These intermittent left leg spasms began suddenly two days ago in the patient's home and have become more regular and worsening in contracture. The spasm began in the quad leading to severe erratic left leg movements that can last from seconds to minutes. Once the leg spasm and contracture stops, an "intense tingling and pain sensation" which starts in the foot and radiates up the left side of his body to his neck. Once the tingling sensation reached his neck, the patient became faint, but did not fully lose consciousness. The patient also had a HA at the time of the spasm and tingling which was diffuse throughout the head and rated a 10/10 on a pain scale. He denied ever having this severe of a HA or leg spasm prior this incident; the HA started yesterday and is still present today. Patient denies fever, chills, N/V/D, orthopnea, NPD, sputum, chest pain, leg swelling, and abdominal pain.     In E.D. pt found to have an abnormal EKG, where house cardiology for consulted.   On admission pt found to have moderate inspiratory and expiratory wheezes. (11 Sep 2017 07:22)      PAST MEDICAL & SURGICAL HISTORY:  History of pneumothorax: History of 3 prior pneumonthoracies.  COPD (chronic obstructive pulmonary disease)  Asthma  Hypertension  Sarcoidosis  No significant past surgical history      REVIEW OF SYSTEMS      General:No Weight change/ Fatigue/ HA/Dizzy	    Skin/Breast: No Rashes/ Lesions/ Masses  	  Ophthalmologic: No Blurry vision/ Glaucoma/ Blindness  	  ENMT: No Hearing loss/ Drainage/ Lesions	    Respiratory and Thorax: No Cough/ Wheezing/ SOB/ Hemoptysis/ Sputum production  	  Cardiovascular: No Chest pain/ Palpitations/ Diaphoresis	    Gastrointestinal: No Nausea/ Vomiting/ Constipation/ Appetite Change	    Genitourinary: No Heamturia/ Dysuria/ Frequency change/ Impotence	    Musculoskeletal: No Pain/ Weakness/ Claudication	    Neurological: No Seizures/ TIA/CVA/ Parastesias	    Psychiatric: No Dementia/ Depression/ SI/HI	    Hematology/Lymphatics: No hx of bleeding/ Edema	    Endocrine:	No Hyperglycemia/ Hypoglycemia    Allergic/Immunologic:	 No Anaphylaxis/ Intolerance/ Recent illnesses    MEDICATIONS  (STANDING):  atorvastatin 80 milliGRAM(s) Oral at bedtime  chlorhexidine 4% Liquid 1 Application(s) Topical daily  aspirin  chewable 81 milliGRAM(s) Oral daily  diVALproex  milliGRAM(s) Oral two times a day  buDESOnide  80 MICROgram(s)/formoterol 4.5 MICROgram(s) Inhaler 2 Puff(s) Inhalation two times a day  polyethylene glycol 3350 17 Gram(s) Oral at bedtime  pantoprazole    Tablet 40 milliGRAM(s) Oral before breakfast  ALBUTerol/ipratropium for Nebulization 3 milliLiter(s) Nebulizer every 6 hours  predniSONE   Tablet 40 milliGRAM(s) Oral daily    MEDICATIONS  (PRN):  acetaminophen    Suspension 650 milliGRAM(s) Oral every 6 hours PRN For Temp greater than 38 C (100.4 F)  ALBUTerol/ipratropium for Nebulization 3 milliLiter(s) Nebulizer every 6 hours PRN Shortness of Breath and/or Wheezing  aluminum hydroxide/magnesium hydroxide/simethicone Suspension 30 milliLiter(s) Oral every 6 hours PRN Dyspepsia      Allergies    No Known Allergies    Intolerances        SOCIAL HISTORY:    FAMILY HISTORY:  Family history of pancreatic cancer (Father)  Family history of essential hypertension (Father)      Vital Signs Last 24 Hrs  T(C): 37.1 (20 Sep 2017 13:56), Max: 37.1 (20 Sep 2017 13:56)  T(F): 98.7 (20 Sep 2017 13:56), Max: 98.7 (20 Sep 2017 13:56)  HR: 95 (20 Sep 2017 18:15) (87 - 113)  BP: 140/80 (20 Sep 2017 18:15) (115/83 - 148/104)  BP(mean): --  RR: 18 (20 Sep 2017 13:56) (18 - 23)  SpO2: 99% (20 Sep 2017 13:56) (95% - 99%)    General: WN/WD NAD  Neurology: Awake, nonfocal, BETH x 4  Eyes: Scleras clear, PERRLA/ EOMI, Gross vision intact  ENT:Gross hearing intact, grossly patent pharynx, no stridor  Neck: Neck supple, trachea midline, No JVD,   Respiratory: Decreased B/L, a few scattered rhonchi  CV: RRR, S1S2, no murmurs, rubs or gallops  Abdominal: Soft, NT, ND +BS,   Extremities: No edema, + peripheral pulses  Skin: No Rashes, Hematoma, Ecchymosis  Lymphatic: No Neck, axilla, groin LAD  Psych: Oriented x 3, normal affect  Incisions: none      LABS:                        12.9   5.76  )-----------( 125      ( 20 Sep 2017 09:00 )             40.3     09-19    143  |  101  |  22  ----------------------------<  102<H>  3.7   |  30  |  1.01    Ca    8.9      19 Sep 2017 06:30      PT/INR - ( 20 Sep 2017 09:00 )   PT: 14.2 SEC;   INR: 1.26          PTT - ( 20 Sep 2017 09:00 )  PTT:28.6 SEC      RADIOLOGY & ADDITIONAL STUDIES:    ASSESSMENT:   47yMalePAST MEDICAL & SURGICAL HISTORY:  History of pneumothorax: History of 3 prior pneumonthoracies.  COPD (chronic obstructive pulmonary disease)  Asthma  Hypertension  Sarcoidosis  No significant past surgical history  HEALTH ISSUES - PROBLEM Dx:  Hemoptysis: Hemoptysis  Hemiplegia following cerebrovascular accident (CVA): Hemiplegia following cerebrovascular accident (CVA)  Systolic heart failure, unspecified heart failure chronicity: Systolic heart failure, unspecified heart failure chronicity  Prophylactic measure: Prophylactic measure  Fever: Fever  Seizure: Seizure  CVA (cerebral vascular accident): CVA (cerebral vascular accident)  Need for prophylactic measure: Need for prophylactic measure  Sarcoidosis: Sarcoidosis  Rhinovirus infection: Rhinovirus infection  COPD exacerbation: COPD exacerbation  Near syncope: Near syncope  Hypertension: Hypertension  COPD (chronic obstructive pulmonary disease): COPD (chronic obstructive pulmonary disease)  Headache: Headache      HEALTH ISSUES - R/O PROBLEM Dx:  46 yo male with the above PMH &PSH. Pt now with a c/o hemoptysis once this am approx 30ml of dark red (burgundy colored) sputum and approx 20ml of burgundy color sputum. Pt stated that he feels sob.         PLAN: quantify & preserved sputum  Hold ATC for now  cbc in am  Possible bronchoscopy  Possible embolization by IR

## 2017-09-20 NOTE — PROGRESS NOTE ADULT - PROBLEM SELECTOR PLAN 3
-c/w Depacon   -follow up neuro rec WBC normal currently, d/c abx. URI vs +HELENE rheumatology consult-possibly secondary to URI f/u serology. SSB slightly elevated rheum f/u appreciated

## 2017-09-20 NOTE — PROGRESS NOTE ADULT - SUBJECTIVE AND OBJECTIVE BOX
Called By RN to evaluate patient with: Hemoptysis, Pt Found in Bed, C/o of SOB with associated Hemoptysis ( About a cup Full)  Pt Denies any Chest pain, Palpitations, Nausea, Vomiting  Pt admits that he has had episodes like this in the Past ( last one was about a year ago)     T(C): 36.9 (09-20-17 @ 06:04), Max: 36.9 (09-20-17 @ 06:04)  HR: 92 (09-20-17 @ 06:04) (90 - 109)  BP: 123/88 (09-20-17 @ 06:04) (110/70 - 127/89)  RR: 18 (09-20-17 @ 06:04) (17 - 18)  SpO2: 95% (09-20-17 @ 06:04) (95% - 97%)  Wt(kg): --  Lungs: Diffuse Ronchi  Heart: S1, S2  Abd: Positive BS, NT, ND  Neuro: LUE Weakness    PAST MEDICAL & SURGICAL HISTORY:  History of pneumothorax: History of 3 prior pneumonthoracies.  COPD (chronic obstructive pulmonary disease)  Asthma  Hypertension  Sarcoidosis  No significant past surgical history    A/P 48 yo M admitted with Near syncope, Found with an acute CVA, Noted with Large PFO  Now with Hemoptysis  will Order Stat CXR, Repeat CBC and Coags,   Pt was recently started on Eliquis for PAF ( will Discuss with attending)  Pt is Hemodynamically Stable Now, No Further hemoptysis  Consider Pulm C/s   will Discuss above with attending

## 2017-09-20 NOTE — PROGRESS NOTE ADULT - PROBLEM SELECTOR PLAN 1
bilateral MCA and right PCA infarcts (R>L) in the frontoparietal and occipital areas which could be cardioembolic in source.  JONATAN-+PFO will d/w neuro and cardiology  in regards to PFO closure.  -EP for ILR  -will review MRI with neurology   ASA 81 mg PO QD  Lipitor 80 mg PO QHS   JwS3m-6.8 LDL-113  Tele monitor  PT/OT eval-PMR acute rehab -Pt states prior episode of hemoptysis required bronchoscopy at [facility]   -case d/w Dr. Riley/Tele PA- Possible need for emergent bronchoscopy and possible IR embolization recommend MICU consult.  -NPO; CXR repeat CBC hold eliquis for now

## 2017-09-20 NOTE — PROGRESS NOTE ADULT - SUBJECTIVE AND OBJECTIVE BOX
Patient is a 47y old  Male who presents with a chief complaint of "My leg wouldn't stop shaking." (14 Sep 2017 09:59)    had hemoptysis: half a cup bright red today  otherwise stable        Any change in ROS:     MEDICATIONS  (STANDING):  atorvastatin 80 milliGRAM(s) Oral at bedtime  chlorhexidine 4% Liquid 1 Application(s) Topical daily  aspirin  chewable 81 milliGRAM(s) Oral daily  diVALproex  milliGRAM(s) Oral two times a day  buDESOnide  80 MICROgram(s)/formoterol 4.5 MICROgram(s) Inhaler 2 Puff(s) Inhalation two times a day  polyethylene glycol 3350 17 Gram(s) Oral at bedtime  pantoprazole    Tablet 40 milliGRAM(s) Oral before breakfast  ALBUTerol/ipratropium for Nebulization 3 milliLiter(s) Nebulizer every 6 hours  predniSONE   Tablet 40 milliGRAM(s) Oral daily    MEDICATIONS  (PRN):  acetaminophen    Suspension 650 milliGRAM(s) Oral every 6 hours PRN For Temp greater than 38 C (100.4 F)  ALBUTerol/ipratropium for Nebulization 3 milliLiter(s) Nebulizer every 6 hours PRN Shortness of Breath and/or Wheezing  aluminum hydroxide/magnesium hydroxide/simethicone Suspension 30 milliLiter(s) Oral every 6 hours PRN Dyspepsia    Vital Signs Last 24 Hrs  T(C): 36.9 (20 Sep 2017 06:04), Max: 36.9 (20 Sep 2017 06:04)  T(F): 98.5 (20 Sep 2017 06:04), Max: 98.5 (20 Sep 2017 06:04)  HR: 106 (20 Sep 2017 09:37) (90 - 113)  BP: 115/83 (20 Sep 2017 09:37) (115/83 - 148/104)  BP(mean): --  RR: 23 (20 Sep 2017 08:30) (18 - 23)  SpO2: 95% (20 Sep 2017 08:30) (95% - 97%)    I&O's Summary        Physical Exam:   GENERAL: NAD, well-groomed, well-developed  HEENT: GIOVANI/   Atraumatic, Normocephalic  ENMT: No tonsillar erythema, exudates, or enlargement; Moist mucous membranes, Good dentition, No lesions  NECK: Supple, No JVD, Normal thyroid  CHEST/LUNG: coarse rhonchi  CVS: Regular rate and rhythm; No murmurs, rubs, or gallops  GI: : Soft, Nontender, Nondistended; Bowel sounds present  NERVOUS SYSTEM:  Alert & Oriented X3  EXTREMITIES:  2+ Peripheral Pulses, No clubbing, cyanosis, or edema  LYMPH: No lymphadenopathy noted  SKIN: No rashes or lesions  ENDOCRINOLOGY: No Thyromegaly  PSYCH: Appropriate    Labs:                              12.9   5.76  )-----------( 125      ( 20 Sep 2017 09:00 )             40.3                         12.2   5.63  )-----------( 116      ( 20 Sep 2017 06:35 )             38.2                         12.8   5.27  )-----------( 118      ( 19 Sep 2017 06:30 )             39.9                         12.7   7.47  )-----------( 112      ( 18 Sep 2017 06:31 )             40.4                         13.1   5.97  )-----------( 115      ( 17 Sep 2017 06:35 )             41.0     09-19    143  |  101  |  22  ----------------------------<  102<H>  3.7   |  30  |  1.01  09-18    143  |  100  |  20  ----------------------------<  82  3.7   |  31  |  1.02  09-17    141  |  98  |  20  ----------------------------<  91  3.9   |  34<H>  |  1.02    Ca    8.9      19 Sep 2017 06:30      CAPILLARY BLOOD GLUCOSE            PT/INR - ( 20 Sep 2017 09:00 )   PT: 14.2 SEC;   INR: 1.26          PTT - ( 20 Sep 2017 09:00 )  PTT:28.6 SEC    Cultures:           < from: Xray Chest 1 View AP- PORTABLE-Urgent (09.20.17 @ 09:32) >  EXAM:  RAD CHEST PORTABLE URGENT        PROCEDURE DATE:  Sep 20 2017         INTERPRETATION:  CLINICAL INFORMATION: Hemoptysis; COPD; sarcoidosis    EXAM: AP view of the chest  obtained 9/20/2017 at 9:12 AM    COMPARISON: Radiograph from  9/12/2017at 10:23 AM    FINDINGS:    The heart is normal in size.  Bilateral upper lobe fibrotic and cystic opacities with tenting of the   diaphragms consistent with sarcoidosis.  No effusions or pneumothorax.     IMPRESSION:    Bilateral upper lobe fibroticchanges consistent with sarcoidosis  No interval change.              HAMIDA RAMIREZ M.D., RADIOLOGY RESIDENT  This document has been electronically signed.  JULIETTE SMITH M.D., ATTENDING RADIOLOGIST  This document has been electronically signed. Sep 20 2017 12:08PM        < end of copied text >                    Studies  Chest X-RAY  CT SCAN Chest   Venous Dopplers: LE:   CT Abdomen  Others

## 2017-09-20 NOTE — PROGRESS NOTE ADULT - PROBLEM SELECTOR PLAN 4
-JONATAN normal LVEF no WMA no ZACKERY thrombus  -case d/w pharmacy to verify meds -c/w Depacon   -follow up neuro rec

## 2017-09-21 LAB
BASOPHILS # BLD AUTO: 0.01 K/UL — SIGNIFICANT CHANGE UP (ref 0–0.2)
BASOPHILS NFR BLD AUTO: 0.1 % — SIGNIFICANT CHANGE UP (ref 0–2)
BUN SERPL-MCNC: 20 MG/DL — SIGNIFICANT CHANGE UP (ref 7–23)
CALCIUM SERPL-MCNC: 9.4 MG/DL — SIGNIFICANT CHANGE UP (ref 8.4–10.5)
CHLORIDE SERPL-SCNC: 99 MMOL/L — SIGNIFICANT CHANGE UP (ref 98–107)
CO2 SERPL-SCNC: 30 MMOL/L — SIGNIFICANT CHANGE UP (ref 22–31)
CREAT SERPL-MCNC: 1.07 MG/DL — SIGNIFICANT CHANGE UP (ref 0.5–1.3)
CULTURE - ACID FAST SMEAR CONCENTRATED: SIGNIFICANT CHANGE UP
EOSINOPHIL # BLD AUTO: 0.07 K/UL — SIGNIFICANT CHANGE UP (ref 0–0.5)
EOSINOPHIL NFR BLD AUTO: 1 % — SIGNIFICANT CHANGE UP (ref 0–6)
GLUCOSE SERPL-MCNC: 90 MG/DL — SIGNIFICANT CHANGE UP (ref 70–99)
GRAM STN SPT: SIGNIFICANT CHANGE UP
HCT VFR BLD CALC: 40.7 % — SIGNIFICANT CHANGE UP (ref 39–50)
HGB BLD-MCNC: 13.1 G/DL — SIGNIFICANT CHANGE UP (ref 13–17)
IMM GRANULOCYTES # BLD AUTO: 0.02 # — SIGNIFICANT CHANGE UP
IMM GRANULOCYTES NFR BLD AUTO: 0.3 % — SIGNIFICANT CHANGE UP (ref 0–1.5)
LYMPHOCYTES # BLD AUTO: 1.67 K/UL — SIGNIFICANT CHANGE UP (ref 1–3.3)
LYMPHOCYTES # BLD AUTO: 22.9 % — SIGNIFICANT CHANGE UP (ref 13–44)
MAGNESIUM SERPL-MCNC: 2 MG/DL — SIGNIFICANT CHANGE UP (ref 1.6–2.6)
MCHC RBC-ENTMCNC: 28.5 PG — SIGNIFICANT CHANGE UP (ref 27–34)
MCHC RBC-ENTMCNC: 32.2 % — SIGNIFICANT CHANGE UP (ref 32–36)
MCV RBC AUTO: 88.5 FL — SIGNIFICANT CHANGE UP (ref 80–100)
MONOCYTES # BLD AUTO: 1.04 K/UL — HIGH (ref 0–0.9)
MONOCYTES NFR BLD AUTO: 14.3 % — HIGH (ref 2–14)
NEUTROPHILS # BLD AUTO: 4.48 K/UL — SIGNIFICANT CHANGE UP (ref 1.8–7.4)
NEUTROPHILS NFR BLD AUTO: 61.4 % — SIGNIFICANT CHANGE UP (ref 43–77)
NRBC # FLD: 0 — SIGNIFICANT CHANGE UP
PLATELET # BLD AUTO: 127 K/UL — LOW (ref 150–400)
PMV BLD: 13.2 FL — HIGH (ref 7–13)
POTASSIUM SERPL-MCNC: 4.4 MMOL/L — SIGNIFICANT CHANGE UP (ref 3.5–5.3)
POTASSIUM SERPL-SCNC: 4.4 MMOL/L — SIGNIFICANT CHANGE UP (ref 3.5–5.3)
RBC # BLD: 4.6 M/UL — SIGNIFICANT CHANGE UP (ref 4.2–5.8)
RBC # FLD: 12.9 % — SIGNIFICANT CHANGE UP (ref 10.3–14.5)
RH IG SCN BLD-IMP: POSITIVE — SIGNIFICANT CHANGE UP
SODIUM SERPL-SCNC: 142 MMOL/L — SIGNIFICANT CHANGE UP (ref 135–145)
SPECIMEN SOURCE: SIGNIFICANT CHANGE UP
SPECIMEN SOURCE: SIGNIFICANT CHANGE UP
WBC # BLD: 7.29 K/UL — SIGNIFICANT CHANGE UP (ref 3.8–10.5)
WBC # FLD AUTO: 7.29 K/UL — SIGNIFICANT CHANGE UP (ref 3.8–10.5)

## 2017-09-21 PROCEDURE — 99233 SBSQ HOSP IP/OBS HIGH 50: CPT

## 2017-09-21 PROCEDURE — 31624 DX BRONCHOSCOPE/LAVAGE: CPT

## 2017-09-21 RX ORDER — SODIUM CHLORIDE 9 MG/ML
500 INJECTION, SOLUTION INTRAVENOUS ONCE
Qty: 0 | Refills: 0 | Status: COMPLETED | OUTPATIENT
Start: 2017-09-21 | End: 2017-09-21

## 2017-09-21 RX ORDER — SODIUM CHLORIDE 9 MG/ML
1000 INJECTION, SOLUTION INTRAVENOUS
Qty: 0 | Refills: 0 | Status: DISCONTINUED | OUTPATIENT
Start: 2017-09-21 | End: 2017-09-24

## 2017-09-21 RX ADMIN — ATORVASTATIN CALCIUM 80 MILLIGRAM(S): 80 TABLET, FILM COATED ORAL at 22:32

## 2017-09-21 RX ADMIN — CHLORHEXIDINE GLUCONATE 1 APPLICATION(S): 213 SOLUTION TOPICAL at 14:25

## 2017-09-21 RX ADMIN — DIVALPROEX SODIUM 500 MILLIGRAM(S): 500 TABLET, DELAYED RELEASE ORAL at 22:32

## 2017-09-21 RX ADMIN — Medication 3 MILLILITER(S): at 18:40

## 2017-09-21 RX ADMIN — BUDESONIDE AND FORMOTEROL FUMARATE DIHYDRATE 2 PUFF(S): 160; 4.5 AEROSOL RESPIRATORY (INHALATION) at 22:32

## 2017-09-21 RX ADMIN — POLYETHYLENE GLYCOL 3350 17 GRAM(S): 17 POWDER, FOR SOLUTION ORAL at 22:32

## 2017-09-21 RX ADMIN — SODIUM CHLORIDE 30 MILLILITER(S): 9 INJECTION, SOLUTION INTRAVENOUS at 11:49

## 2017-09-21 RX ADMIN — SODIUM CHLORIDE 1000 MILLILITER(S): 9 INJECTION, SOLUTION INTRAVENOUS at 18:35

## 2017-09-21 RX ADMIN — BUDESONIDE AND FORMOTEROL FUMARATE DIHYDRATE 2 PUFF(S): 160; 4.5 AEROSOL RESPIRATORY (INHALATION) at 14:24

## 2017-09-21 NOTE — BRIEF OPERATIVE NOTE - PROCEDURE
<<-----Click on this checkbox to enter Procedure Bronchoscopy with bronchoalveolar lavage of both sides  09/21/2017    Active  FEDE

## 2017-09-21 NOTE — PROGRESS NOTE ADULT - PROBLEM SELECTOR PLAN 1
-Pt states prior episode of hemoptysis required bronchoscopy with intervention at [facility]   -Case d/w Dr. Barr will place on schedule for Bronchoscopy today in light of repeated hemoptysis with normal CXR and off eliquis  -H/H stable  -Attempted to contact Pt pulmonologist Dr. Godinez [phone] as family/pt requested transfer to [facility] -Pt states prior episode of hemoptysis required bronchoscopy with intervention at [facility]   -Case d/w Dr. Barr will place on schedule for Bronchoscopy today in light of repeated hemoptysis with normal CXR and off eliquis  -H/H stable  -Attempted to contact Pt pulmonologist Dr. Godinez [phone] as family/pt requested transfer to [facility]. Left my cell phone number await call back.

## 2017-09-21 NOTE — PROGRESS NOTE ADULT - PROBLEM SELECTOR PLAN 2
bilateral MCA and right PCA infarcts (R>L) in the frontoparietal and occipital areas which could be cardioembolic in source episode of afib.  JONATAN-+PFO closure as outpt W/ Dr. Dominguez  -EP reviewed EKG on 9/12 afib  -will review MRI with neurology   ASA 81 mg PO QD  Lipitor 80 mg PO QHS   [id number]-7.8 LDL-113  Tele monitor  PT/OT eval-PMR acute rehab

## 2017-09-21 NOTE — PROGRESS NOTE ADULT - ASSESSMENT
47 year old -American male w/ hx sarcoid, Myceteoma initially presenting with LLE spasm. Hospital course complicated by an episode of unresponsiveness and gaze deviation concerning for seizure vs stroke. He was subsequently intubated for airway protection and extubated. Post extubation, mental status improved, though exam notable for L-sided weakness. EEG showed burst suppression pattern while on sedation started on antiepileptic +URI-enterovirus s/p ABx for poss lung infection. MRI brain showed bilateral MCA and right PCA infarcts (R>L) in the frontoparietal and occipital areas which could be cardioembolic in source. +new onset afib started on eliquis complicated by hemoptysis.  MRA head/neck is grossly unremarkable although limited by motion.  TTE done at bedside by ICU team showed positive bubble study with normal EF.  JONATAN- +PFO  LE doppler negative for DVT bilaterally

## 2017-09-21 NOTE — PROGRESS NOTE ADULT - PROBLEM SELECTOR PLAN 1
had hemoptysis today , half a cup red in color: yesterday s=he was started on eliquis and he has been coughing also a lot: He also hemoptysized before and he probably had pulmonary arterial embolization in the left upper lobe artery: He was told if he hemoptysize again, he may need surgery: MICU called and thoracic surgery called too: I h ave spoke to attending myself. Will stop eliquis for now: and monitor:  had another episode of hemoptysis today: DW with thoracic surgery attending: for bronchsocopy today: Please get IR consult too!!  NO AC

## 2017-09-21 NOTE — PROGRESS NOTE ADULT - PROBLEM SELECTOR PLAN 3
WBC normal currently URI vs +HELENE rheumatology consult-possibly secondary to URI SSB slightly elevated of unclear significance. f/u serology. WBC normal currently URI vs +HELENE rheumatology consult-possibly secondary to URI SSB slightly elevated of unclear significance. serology till date neg

## 2017-09-21 NOTE — PROGRESS NOTE ADULT - SUBJECTIVE AND OBJECTIVE BOX
Pt without complaints.  He denies dyspnea.  He denies any new hemoptysis.      VSS    Stable s/p FB/ BAL    Plan for IR embolization if there is any further hemoptysis.  This was explained to the tele PA covering.

## 2017-09-21 NOTE — PROGRESS NOTE ADULT - SUBJECTIVE AND OBJECTIVE BOX
Patient is a 47y old  Male who presents with a chief complaint of "My leg wouldn't stop shaking." (14 Sep 2017 09:59)  Another episode of hemoptysis today  left a message: Lelia Godinez: [phone] with my cell estela to call back   evaluated by thoracic surgery and MICU last night  was NPO , but unfortunately was fed: now NPO again for bronchoscopy again by thoracic surgery     Any change in ROS:     MEDICATIONS  (STANDING):  atorvastatin 80 milliGRAM(s) Oral at bedtime  chlorhexidine 4% Liquid 1 Application(s) Topical daily  aspirin  chewable 81 milliGRAM(s) Oral daily  diVALproex  milliGRAM(s) Oral two times a day  buDESOnide  80 MICROgram(s)/formoterol 4.5 MICROgram(s) Inhaler 2 Puff(s) Inhalation two times a day  polyethylene glycol 3350 17 Gram(s) Oral at bedtime  pantoprazole    Tablet 40 milliGRAM(s) Oral before breakfast  ALBUTerol/ipratropium for Nebulization 3 milliLiter(s) Nebulizer every 6 hours  predniSONE   Tablet 40 milliGRAM(s) Oral daily  lactated ringers. 1000 milliLiter(s) (30 mL/Hr) IV Continuous <Continuous>    MEDICATIONS  (PRN):  acetaminophen    Suspension 650 milliGRAM(s) Oral every 6 hours PRN For Temp greater than 38 C (100.4 F)  ALBUTerol/ipratropium for Nebulization 3 milliLiter(s) Nebulizer every 6 hours PRN Shortness of Breath and/or Wheezing  aluminum hydroxide/magnesium hydroxide/simethicone Suspension 30 milliLiter(s) Oral every 6 hours PRN Dyspepsia    Vital Signs Last 24 Hrs  T(C): 36.7 (21 Sep 2017 12:15), Max: 37.1 (20 Sep 2017 13:56)  T(F): 98 (21 Sep 2017 12:15), Max: 98.7 (20 Sep 2017 13:56)  HR: 88 (21 Sep 2017 12:15) (85 - 130)  BP: 138/99 (21 Sep 2017 12:15) (117/78 - 160/95)  BP(mean): --  RR: 18 (21 Sep 2017 12:15) (18 - 24)  SpO2: 96% (21 Sep 2017 12:15) (94% - 99%)    I&O's Summary        Physical Exam:   GENERAL: NAD, well-groomed, well-developed  HEENT: GIOVANI/   Atraumatic, Normocephalic  ENMT: No tonsillar erythema, exudates, or enlargement; Moist mucous membranes, Good dentition, No lesions  NECK: Supple, No JVD, Normal thyroid  CHEST/LUNG: mild coarse rhonchi  CVS: Regular rate and rhythm; No murmurs, rubs, or gallops  GI: : Soft, Nontender, Nondistended; Bowel sounds present  NERVOUS SYSTEM:  Alert & Oriented X3  EXTREMITIES:  2+ Peripheral Pulses, No clubbing, cyanosis, or edema  LYMPH: No lymphadenopathy noted  SKIN: No rashes or lesions  ENDOCRINOLOGY: No Thyromegaly  PSYCH: Appropriate    Labs:                              13.1   7.29  )-----------( 127      ( 21 Sep 2017 06:50 )             40.7                         12.9   5.76  )-----------( 125      ( 20 Sep 2017 09:00 )             40.3                         12.2   5.63  )-----------( 116      ( 20 Sep 2017 06:35 )             38.2                         12.8   5.27  )-----------( 118      ( 19 Sep 2017 06:30 )             39.9                         12.7   7.47  )-----------( 112      ( 18 Sep 2017 06:31 )             40.4     09-21    142  |  99  |  20  ----------------------------<  90  4.4   |  30  |  1.07  09-19    143  |  101  |  22  ----------------------------<  102<H>  3.7   |  30  |  1.01  09-18    143  |  100  |  20  ----------------------------<  82  3.7   |  31  |  1.02    Ca    9.4      21 Sep 2017 06:50  Mg     2.0     09-21      CAPILLARY BLOOD GLUCOSE            PT/INR - ( 20 Sep 2017 09:00 )   PT: 14.2 SEC;   INR: 1.26          PTT - ( 20 Sep 2017 09:00 )  PTT:28.6 SEC    Cultures:         < from: Xray Chest 1 View AP- PORTABLE-Urgent (09.20.17 @ 09:32) >    EXAM:  RAD CHEST PORTABLE URGENT        PROCEDURE DATE:  Sep 20 2017         INTERPRETATION:  CLINICAL INFORMATION: Hemoptysis; COPD; sarcoidosis    EXAM: AP view of the chest  obtained 9/20/2017 at 9:12 AM    COMPARISON: Radiograph from  9/12/2017at 10:23 AM    FINDINGS:    The heart is normal in size.  Bilateral upper lobe fibrotic and cystic opacities with tenting of the   diaphragms consistent with sarcoidosis.  No effusions or pneumothorax.     IMPRESSION:    Bilateral upper lobe fibroticchanges consistent with sarcoidosis  No interval change.    < end of copied text >                      Studies  Chest X-RAY  CT SCAN Chest   Venous Dopplers: LE:   CT Abdomen  Others

## 2017-09-21 NOTE — PROGRESS NOTE ADULT - SUBJECTIVE AND OBJECTIVE BOX
Patient is a 47y old  Male who presents with a chief complaint of "My leg wouldn't stop shaking." (14 Sep 2017 09:59)      SUBJECTIVE / OVERNIGHT EVENTS: Pt feeling hot and annoyed kept NPO yesterday without any intervention. Tele rates in 90's with occasional SVT/ST rate 150. This AM had a quarter cup of kwaku blood    MEDICATIONS  (STANDING):  atorvastatin 80 milliGRAM(s) Oral at bedtime  chlorhexidine 4% Liquid 1 Application(s) Topical daily  aspirin  chewable 81 milliGRAM(s) Oral daily  diVALproex  milliGRAM(s) Oral two times a day  buDESOnide  80 MICROgram(s)/formoterol 4.5 MICROgram(s) Inhaler 2 Puff(s) Inhalation two times a day  polyethylene glycol 3350 17 Gram(s) Oral at bedtime  pantoprazole    Tablet 40 milliGRAM(s) Oral before breakfast  ALBUTerol/ipratropium for Nebulization 3 milliLiter(s) Nebulizer every 6 hours  predniSONE   Tablet 40 milliGRAM(s) Oral daily    MEDICATIONS  (PRN):  acetaminophen    Suspension 650 milliGRAM(s) Oral every 6 hours PRN For Temp greater than 38 C (100.4 F)  ALBUTerol/ipratropium for Nebulization 3 milliLiter(s) Nebulizer every 6 hours PRN Shortness of Breath and/or Wheezing  aluminum hydroxide/magnesium hydroxide/simethicone Suspension 30 milliLiter(s) Oral every 6 hours PRN Dyspepsia        CAPILLARY BLOOD GLUCOSE        I&O's Summary      T(C): 36.6 (09-21-17 @ 05:23), Max: 37.1 (09-20-17 @ 13:56)  HR: 85 (09-21-17 @ 05:23) (85 - 95)  BP: 117/78 (09-21-17 @ 05:23) (117/78 - 160/95)  RR: 18 (09-21-17 @ 05:23) (18 - 18)  SpO2: 94% (09-21-17 @ 05:23) (94% - 99%)    PHYSICAL EXAM:  GENERAL: NAD, well-developed  HEAD:  Atraumatic, Normocephalic  EYES: EOMI, PERRLA, conjunctiva and sclera clear  NECK: Supple, No JVD  CHEST/LUNG: transmitted BS with occasional wheeze  HEART: Regular rate and rhythm; No murmurs, rubs, or gallops  ABDOMEN: Soft, Nontender, Nondistended; Bowel sounds present  EXTREMITIES:  2+ Peripheral Pulses, No clubbing, cyanosis, or edema  PSYCH: AAOx3  NEUROLOGY: Lt hemiparesis UE 3/5 LE 4/5  SKIN: No rashes or lesions    LABS:                        13.1   7.29  )-----------( 127      ( 21 Sep 2017 06:50 )             40.7     09-21    142  |  99  |  20  ----------------------------<  90  4.4   |  30  |  1.07    Ca    9.4      21 Sep 2017 06:50  Mg     2.0     09-21      PT/INR - ( 20 Sep 2017 09:00 )   PT: 14.2 SEC;   INR: 1.26          PTT - ( 20 Sep 2017 09:00 )  PTT:28.6 SEC          RADIOLOGY & ADDITIONAL TESTS:    Imaging Personally Reviewed:< from: Xray Chest 1 View AP- PORTABLE-Urgent (09.20.17 @ 09:32) >  Bilateral upper lobe fibroticchanges consistent with sarcoidosis  No interval change.    < end of copied text >      Consultant(s) Notes Reviewed:      Care Discussed with Consultants/Other Providers: Patient is a 47y old  Male who presents with a chief complaint of "My leg wouldn't stop shaking." (14 Sep 2017 09:59)      SUBJECTIVE / OVERNIGHT EVENTS: Pt feeling hot and annoyed kept NPO yesterday without any intervention. Tele rates in 90's with occasional short burst SVT/ST rate 150. This AM had a quarter cup of kwaku blood    MEDICATIONS  (STANDING):  atorvastatin 80 milliGRAM(s) Oral at bedtime  chlorhexidine 4% Liquid 1 Application(s) Topical daily  aspirin  chewable 81 milliGRAM(s) Oral daily  diVALproex  milliGRAM(s) Oral two times a day  buDESOnide  80 MICROgram(s)/formoterol 4.5 MICROgram(s) Inhaler 2 Puff(s) Inhalation two times a day  polyethylene glycol 3350 17 Gram(s) Oral at bedtime  pantoprazole    Tablet 40 milliGRAM(s) Oral before breakfast  ALBUTerol/ipratropium for Nebulization 3 milliLiter(s) Nebulizer every 6 hours  predniSONE   Tablet 40 milliGRAM(s) Oral daily    MEDICATIONS  (PRN):  acetaminophen    Suspension 650 milliGRAM(s) Oral every 6 hours PRN For Temp greater than 38 C (100.4 F)  ALBUTerol/ipratropium for Nebulization 3 milliLiter(s) Nebulizer every 6 hours PRN Shortness of Breath and/or Wheezing  aluminum hydroxide/magnesium hydroxide/simethicone Suspension 30 milliLiter(s) Oral every 6 hours PRN Dyspepsia        CAPILLARY BLOOD GLUCOSE        I&O's Summary      T(C): 36.6 (09-21-17 @ 05:23), Max: 37.1 (09-20-17 @ 13:56)  HR: 85 (09-21-17 @ 05:23) (85 - 95)  BP: 117/78 (09-21-17 @ 05:23) (117/78 - 160/95)  RR: 18 (09-21-17 @ 05:23) (18 - 18)  SpO2: 94% (09-21-17 @ 05:23) (94% - 99%)    PHYSICAL EXAM:  GENERAL: NAD, well-developed  HEAD:  Atraumatic, Normocephalic  EYES: EOMI, PERRLA, conjunctiva and sclera clear  NECK: Supple, No JVD  CHEST/LUNG: transmitted BS with occasional wheeze  HEART: Regular rate and rhythm; No murmurs, rubs, or gallops  ABDOMEN: Soft, Nontender, Nondistended; Bowel sounds present  EXTREMITIES:  2+ Peripheral Pulses, No clubbing, cyanosis, or edema  PSYCH: AAOx3  NEUROLOGY: Lt hemiparesis UE 3/5 LE 4/5  SKIN: No rashes or lesions    LABS:                        13.1   7.29  )-----------( 127      ( 21 Sep 2017 06:50 )             40.7     09-21    142  |  99  |  20  ----------------------------<  90  4.4   |  30  |  1.07    Ca    9.4      21 Sep 2017 06:50  Mg     2.0     09-21      PT/INR - ( 20 Sep 2017 09:00 )   PT: 14.2 SEC;   INR: 1.26          PTT - ( 20 Sep 2017 09:00 )  PTT:28.6 SEC          RADIOLOGY & ADDITIONAL TESTS:    Imaging Personally Reviewed:< from: Xray Chest 1 View AP- PORTABLE-Urgent (09.20.17 @ 09:32) >  Bilateral upper lobe fibroticchanges consistent with sarcoidosis  No interval change.    < end of copied text >      Consultant(s) Notes Reviewed:      Care Discussed with Consultants/Other Providers:

## 2017-09-22 ENCOUNTER — TRANSCRIPTION ENCOUNTER (OUTPATIENT)
Age: 47
End: 2017-09-22

## 2017-09-22 LAB
BASOPHILS # BLD AUTO: 0.01 K/UL — SIGNIFICANT CHANGE UP (ref 0–0.2)
BASOPHILS NFR BLD AUTO: 0.1 % — SIGNIFICANT CHANGE UP (ref 0–2)
BUN SERPL-MCNC: 26 MG/DL — HIGH (ref 7–23)
CALCIUM SERPL-MCNC: 9.2 MG/DL — SIGNIFICANT CHANGE UP (ref 8.4–10.5)
CHLORIDE SERPL-SCNC: 100 MMOL/L — SIGNIFICANT CHANGE UP (ref 98–107)
CO2 SERPL-SCNC: 28 MMOL/L — SIGNIFICANT CHANGE UP (ref 22–31)
CREAT SERPL-MCNC: 1.21 MG/DL — SIGNIFICANT CHANGE UP (ref 0.5–1.3)
CULTURE - ACID FAST SMEAR CONCENTRATED: SIGNIFICANT CHANGE UP
EOSINOPHIL # BLD AUTO: 0.03 K/UL — SIGNIFICANT CHANGE UP (ref 0–0.5)
EOSINOPHIL NFR BLD AUTO: 0.4 % — SIGNIFICANT CHANGE UP (ref 0–6)
GBM IGG SER-ACNC: <0.2 U — SIGNIFICANT CHANGE UP
GLUCOSE SERPL-MCNC: 97 MG/DL — SIGNIFICANT CHANGE UP (ref 70–99)
HCT VFR BLD CALC: 39.3 % — SIGNIFICANT CHANGE UP (ref 39–50)
HGB BLD-MCNC: 12.4 G/DL — LOW (ref 13–17)
IMM GRANULOCYTES # BLD AUTO: 0.03 # — SIGNIFICANT CHANGE UP
IMM GRANULOCYTES NFR BLD AUTO: 0.4 % — SIGNIFICANT CHANGE UP (ref 0–1.5)
LYMPHOCYTES # BLD AUTO: 1.79 K/UL — SIGNIFICANT CHANGE UP (ref 1–3.3)
LYMPHOCYTES # BLD AUTO: 21 % — SIGNIFICANT CHANGE UP (ref 13–44)
MAGNESIUM SERPL-MCNC: 2.1 MG/DL — SIGNIFICANT CHANGE UP (ref 1.6–2.6)
MCHC RBC-ENTMCNC: 28.2 PG — SIGNIFICANT CHANGE UP (ref 27–34)
MCHC RBC-ENTMCNC: 31.6 % — LOW (ref 32–36)
MCV RBC AUTO: 89.3 FL — SIGNIFICANT CHANGE UP (ref 80–100)
MONOCYTES # BLD AUTO: 0.7 K/UL — SIGNIFICANT CHANGE UP (ref 0–0.9)
MONOCYTES NFR BLD AUTO: 8.2 % — SIGNIFICANT CHANGE UP (ref 2–14)
NEUTROPHILS # BLD AUTO: 5.98 K/UL — SIGNIFICANT CHANGE UP (ref 1.8–7.4)
NEUTROPHILS NFR BLD AUTO: 69.9 % — SIGNIFICANT CHANGE UP (ref 43–77)
NRBC # FLD: 0 — SIGNIFICANT CHANGE UP
PLATELET # BLD AUTO: 132 K/UL — LOW (ref 150–400)
PMV BLD: 13.8 FL — HIGH (ref 7–13)
POTASSIUM SERPL-MCNC: 5.1 MMOL/L — SIGNIFICANT CHANGE UP (ref 3.5–5.3)
POTASSIUM SERPL-SCNC: 5.1 MMOL/L — SIGNIFICANT CHANGE UP (ref 3.5–5.3)
RBC # BLD: 4.4 M/UL — SIGNIFICANT CHANGE UP (ref 4.2–5.8)
RBC # FLD: 12.8 % — SIGNIFICANT CHANGE UP (ref 10.3–14.5)
SODIUM SERPL-SCNC: 141 MMOL/L — SIGNIFICANT CHANGE UP (ref 135–145)
SPECIMEN SOURCE: SIGNIFICANT CHANGE UP
WBC # BLD: 8.54 K/UL — SIGNIFICANT CHANGE UP (ref 3.8–10.5)
WBC # FLD AUTO: 8.54 K/UL — SIGNIFICANT CHANGE UP (ref 3.8–10.5)

## 2017-09-22 PROCEDURE — 99233 SBSQ HOSP IP/OBS HIGH 50: CPT

## 2017-09-22 PROCEDURE — 71275 CT ANGIOGRAPHY CHEST: CPT | Mod: 26

## 2017-09-22 PROCEDURE — 99231 SBSQ HOSP IP/OBS SF/LOW 25: CPT | Mod: GC

## 2017-09-22 RX ADMIN — DIVALPROEX SODIUM 500 MILLIGRAM(S): 500 TABLET, DELAYED RELEASE ORAL at 17:10

## 2017-09-22 RX ADMIN — POLYETHYLENE GLYCOL 3350 17 GRAM(S): 17 POWDER, FOR SOLUTION ORAL at 21:49

## 2017-09-22 RX ADMIN — DIVALPROEX SODIUM 500 MILLIGRAM(S): 500 TABLET, DELAYED RELEASE ORAL at 07:37

## 2017-09-22 RX ADMIN — SODIUM CHLORIDE 30 MILLILITER(S): 9 INJECTION, SOLUTION INTRAVENOUS at 21:51

## 2017-09-22 RX ADMIN — PANTOPRAZOLE SODIUM 40 MILLIGRAM(S): 20 TABLET, DELAYED RELEASE ORAL at 06:05

## 2017-09-22 RX ADMIN — ATORVASTATIN CALCIUM 80 MILLIGRAM(S): 80 TABLET, FILM COATED ORAL at 21:49

## 2017-09-22 RX ADMIN — Medication 81 MILLIGRAM(S): at 12:15

## 2017-09-22 RX ADMIN — Medication 40 MILLIGRAM(S): at 06:05

## 2017-09-22 RX ADMIN — CHLORHEXIDINE GLUCONATE 1 APPLICATION(S): 213 SOLUTION TOPICAL at 12:15

## 2017-09-22 RX ADMIN — SODIUM CHLORIDE 30 MILLILITER(S): 9 INJECTION, SOLUTION INTRAVENOUS at 02:12

## 2017-09-22 NOTE — PROGRESS NOTE ADULT - PROBLEM SELECTOR PLAN 2
bilateral MCA and right PCA infarcts (R>L) in the frontoparietal and occipital areas which could be cardioembolic in source episode of afib.  JONATAN-+PFO closure being discussed  -EP reviewed EKG on 9/12 afib  -  ASA 81 mg PO QD  -likely embolic  Lipitor 80 mg PO QHS   FqO3v-5.8 LDL-113  Tele monitor  PT/OT eval-PMR acute rehab

## 2017-09-22 NOTE — PROGRESS NOTE ADULT - SUBJECTIVE AND OBJECTIVE BOX
Follow-up Pulm Progress Note    No hemoptysis overnight.     Medications:  MEDICATIONS  (STANDING):  atorvastatin 80 milliGRAM(s) Oral at bedtime  chlorhexidine 4% Liquid 1 Application(s) Topical daily  aspirin  chewable 81 milliGRAM(s) Oral daily  diVALproex  milliGRAM(s) Oral two times a day  buDESOnide  80 MICROgram(s)/formoterol 4.5 MICROgram(s) Inhaler 2 Puff(s) Inhalation two times a day  polyethylene glycol 3350 17 Gram(s) Oral at bedtime  pantoprazole    Tablet 40 milliGRAM(s) Oral before breakfast  ALBUTerol/ipratropium for Nebulization 3 milliLiter(s) Nebulizer every 6 hours  predniSONE   Tablet 40 milliGRAM(s) Oral daily  lactated ringers. 1000 milliLiter(s) (30 mL/Hr) IV Continuous <Continuous>    MEDICATIONS  (PRN):  acetaminophen    Suspension 650 milliGRAM(s) Oral every 6 hours PRN For Temp greater than 38 C (100.4 F)  ALBUTerol/ipratropium for Nebulization 3 milliLiter(s) Nebulizer every 6 hours PRN Shortness of Breath and/or Wheezing  aluminum hydroxide/magnesium hydroxide/simethicone Suspension 30 milliLiter(s) Oral every 6 hours PRN Dyspepsia    Vital Signs Last 24 Hrs  T(C): 36.5 (22 Sep 2017 15:32), Max: 36.8 (22 Sep 2017 06:03)  T(F): 97.7 (22 Sep 2017 15:32), Max: 98.2 (22 Sep 2017 06:03)  HR: 72 (22 Sep 2017 15:32) (72 - 98)  BP: 99/65 (22 Sep 2017 15:32) (81/45 - 109/74)  BP(mean): --  RR: 18 (22 Sep 2017 15:32) (14 - 24)  SpO2: 97% (22 Sep 2017 15:32) (97% - 100%)          09-21 @ 07:01  -  09-22 @ 07:00  --------------------------------------------------------  IN: 530 mL / OUT: 0 mL / NET: 530 mL          LABS:                        12.4   8.54  )-----------( 132      ( 22 Sep 2017 06:17 )             39.3     09-22    141  |  100  |  26<H>  ----------------------------<  97  5.1   |  28  |  1.21    Ca    9.2      22 Sep 2017 06:17  Mg     2.1     09-22            CAPILLARY BLOOD GLUCOSE    HELENE -- 09-19 @ 06:30  Anti SS-1 <0.2  Anti SS-2 1.0  Anti RNP --  RF -- 09-19 @ 06:30    Atypical ANCA -- 09-19 @ 06:30  c-ANCA titer -- 09-19 @ 06:30  c-ANCA -- 09-19 @ 06:30  p-ANCA -- 09-19 @ 06:30  HELENE 1:160 09-14 @ 11:30  Anti SS-1 --  Anti SS-2 --  Anti RNP --  RF -- 09-14 @ 11:30    Atypical ANCA -- 09-14 @ 11:30  c-ANCA titer -- 09-14 @ 11:30  c-ANCA Negative 09-14 @ 11:30  p-ANCA Negative 09-14 @ 11:30    CULTURES: (if applicable)    Physical Examination:  PULM: decreased BS at bases  CVS: S1, S2 heard    RADIOLOGY REVIEWED  CXR:     CT chest: scarrring, traction, sarcoid, ? mycetoma    TTE:

## 2017-09-22 NOTE — PROGRESS NOTE ADULT - SUBJECTIVE AND OBJECTIVE BOX
Patient is a 47y old  Male who presents with a chief complaint of "My leg wouldn't stop shaking." (14 Sep 2017 09:59)      SUBJECTIVE / OVERNIGHT EVENTS: Pt in NAD no bloody sputum this AM NPO. s/p bronchoscopy yesterday with blood LT upper lobe intially     MEDICATIONS  (STANDING):  atorvastatin 80 milliGRAM(s) Oral at bedtime  chlorhexidine 4% Liquid 1 Application(s) Topical daily  aspirin  chewable 81 milliGRAM(s) Oral daily  diVALproex  milliGRAM(s) Oral two times a day  buDESOnide  80 MICROgram(s)/formoterol 4.5 MICROgram(s) Inhaler 2 Puff(s) Inhalation two times a day  polyethylene glycol 3350 17 Gram(s) Oral at bedtime  pantoprazole    Tablet 40 milliGRAM(s) Oral before breakfast  ALBUTerol/ipratropium for Nebulization 3 milliLiter(s) Nebulizer every 6 hours  predniSONE   Tablet 40 milliGRAM(s) Oral daily  lactated ringers. 1000 milliLiter(s) (30 mL/Hr) IV Continuous <Continuous>    MEDICATIONS  (PRN):  acetaminophen    Suspension 650 milliGRAM(s) Oral every 6 hours PRN For Temp greater than 38 C (100.4 F)  ALBUTerol/ipratropium for Nebulization 3 milliLiter(s) Nebulizer every 6 hours PRN Shortness of Breath and/or Wheezing  aluminum hydroxide/magnesium hydroxide/simethicone Suspension 30 milliLiter(s) Oral every 6 hours PRN Dyspepsia        CAPILLARY BLOOD GLUCOSE        I&O's Summary    21 Sep 2017 07:01  -  22 Sep 2017 07:00  --------------------------------------------------------  IN: 530 mL / OUT: 0 mL / NET: 530 mL        T(C): 36.8 (09-22-17 @ 06:03), Max: 36.9 (09-21-17 @ 15:41)  HR: 72 (09-22-17 @ 06:03) (72 - 104)  BP: 109/74 (09-22-17 @ 06:03) (81/45 - 131/89)  RR: 19 (09-22-17 @ 06:03) (14 - 24)  SpO2: 99% (09-22-17 @ 06:03) (94% - 100%)    PHYSICAL EXAM:  GENERAL: NAD, well-developed  HEAD:  Atraumatic, Normocephalic  EYES: EOMI, PERRLA, conjunctiva and sclera clear  NECK: Supple, No JVD  CHEST/LUNG: transmitted BS with occasional wheeze  HEART: Regular rate and rhythm; No murmurs, rubs, or gallops  ABDOMEN: Soft, Nontender, Nondistended; Bowel sounds present  EXTREMITIES:  2+ Peripheral Pulses, No clubbing, cyanosis, or edema  PSYCH: AAOx3  NEUROLOGY: Lt hemiparesis UE 3/5 LE 4/5  SKIN: No rashes or lesions    LABS:                        12.4   8.54  )-----------( 132      ( 22 Sep 2017 06:17 )             39.3     09-22    141  |  100  |  26<H>  ----------------------------<  97  5.1   |  28  |  1.21    Ca    9.2      22 Sep 2017 06:17  Mg     2.1     09-22            Culture - Acid Fast Smear Concentrated:   AFB SMEAR= NO ACID FAST BACILLI SEEN (09.21.17 @ 18:21)  Culture - Respiratory:   NO GROWTH - PRELIMINARY RESULTS (09.21.17 @ 18:21)          RADIOLOGY & ADDITIONAL TESTS:    Imaging Personally Reviewed:    Consultant(s) Notes Reviewed:      Care Discussed with Consultants/Other Providers:

## 2017-09-22 NOTE — PROGRESS NOTE ADULT - SUBJECTIVE AND OBJECTIVE BOX
Date of Admission: 9/11/17     24H hour events:  Yesterday he underwent a bronchoscopy with BAL demonstrating DAVID bleeding.     MEDICATIONS:  aspirin  chewable 81 milliGRAM(s) Oral daily      ALBUTerol/ipratropium for Nebulization 3 milliLiter(s) Nebulizer every 6 hours PRN  buDESOnide  80 MICROgram(s)/formoterol 4.5 MICROgram(s) Inhaler 2 Puff(s) Inhalation two times a day  ALBUTerol/ipratropium for Nebulization 3 milliLiter(s) Nebulizer every 6 hours    acetaminophen    Suspension 650 milliGRAM(s) Oral every 6 hours PRN  diVALproex  milliGRAM(s) Oral two times a day    polyethylene glycol 3350 17 Gram(s) Oral at bedtime  aluminum hydroxide/magnesium hydroxide/simethicone Suspension 30 milliLiter(s) Oral every 6 hours PRN  pantoprazole    Tablet 40 milliGRAM(s) Oral before breakfast    atorvastatin 80 milliGRAM(s) Oral at bedtime  predniSONE   Tablet 40 milliGRAM(s) Oral daily    chlorhexidine 4% Liquid 1 Application(s) Topical daily  lactated ringers. 1000 milliLiter(s) IV Continuous <Continuous>      REVIEW OF SYSTEMS:  Complete 10point ROS negative.    PHYSICAL EXAM:  T(C): 36.8 (09-22-17 @ 06:03), Max: 37 (09-21-17 @ 14:16)  HR: 72 (09-22-17 @ 06:03) (72 - 104)  BP: 109/74 (09-22-17 @ 06:03) (81/45 - 138/99)  RR: 19 (09-22-17 @ 06:03) (14 - 24)  SpO2: 99% (09-22-17 @ 06:03) (94% - 100%)  Wt(kg): --  I&O's Summary    21 Sep 2017 07:01  -  22 Sep 2017 07:00  --------------------------------------------------------  IN: 530 mL / OUT: 0 mL / NET: 530 mL        Appearance: Normal	  HEENT:   Normal oral mucosa, PERRL, EOMI	  Lymphatic: No lymphadenopathy  Cardiovascular: Normal S1 S2, No JVD, No murmurs, No edema  Respiratory: Lungs clear to auscultation	  Psychiatry: A & O x 3, Mood & affect appropriate  Gastrointestinal:  Soft, Non-tender, + BS	  Skin: No rashes, No ecchymoses, No cyanosis	  Neurologic: Non-focal  Extremities: Normal range of motion, No clubbing, cyanosis or edema  Vascular: Peripheral pulses palpable 2+ bilaterally        LABS:	 	    CBC Full  -  ( 22 Sep 2017 06:17 )  WBC Count : 8.54 K/uL  Hemoglobin : 12.4 g/dL  Hematocrit : 39.3 %  Platelet Count - Automated : 132 K/uL  Mean Cell Volume : 89.3 fL  Mean Cell Hemoglobin : 28.2 pg  Mean Cell Hemoglobin Concentration : 31.6 %  Auto Neutrophil # : 5.98 K/uL  Auto Lymphocyte # : 1.79 K/uL  Auto Monocyte # : 0.70 K/uL  Auto Eosinophil # : 0.03 K/uL  Auto Basophil # : 0.01 K/uL  Auto Neutrophil % : 69.9 %  Auto Lymphocyte % : 21.0 %  Auto Monocyte % : 8.2 %  Auto Eosinophil % : 0.4 %  Auto Basophil % : 0.1 %    09-22    141  |  100  |  26<H>  ----------------------------<  97  5.1   |  28  |  1.21  09-21    142  |  99  |  20  ----------------------------<  90  4.4   |  30  |  1.07    Ca    9.2      22 Sep 2017 06:17  Ca    9.4      21 Sep 2017 06:50  Mg     2.1     09-22  Mg     2.0     09-21    TELEMETRY: 	  NSR/ST 70-130s.    	  ASSESSMENT/PLAN: 	    47M w/HTN, COPD, asthma, sarcoidosis with hx of pneumothoracies cardiology consulted for EKG changes course complicated by seizure activity in setting of R MCA and PCA infarcts now found to have large PFO on JONATAN w/normal LV function course c/b hemoptysis on AC for pAF.     #R. MCA and PCA infarcts with large PFO seen on JONATAN 9/18. No LV thrombus seen on JONATAN.  - AC currently on hold given hemoptysis, bronch demonstrated DAVID bleeding. Plan for IR embolization if recurrent hemoptysis.   - Once stable and optimized, plan for outpatient referral to Dr. Jamie Dominguez at [facility] for evaluation of PFO closure with Amplatzer device   - c/w ASA and lipitor 	  - Will continue to follow    [id number] Date of Admission: 9/11/17     24H hour events:  Yesterday he underwent a bronchoscopy with BAL demonstrating DAVID bleeding.     MEDICATIONS:  aspirin  chewable 81 milliGRAM(s) Oral daily      ALBUTerol/ipratropium for Nebulization 3 milliLiter(s) Nebulizer every 6 hours PRN  buDESOnide  80 MICROgram(s)/formoterol 4.5 MICROgram(s) Inhaler 2 Puff(s) Inhalation two times a day  ALBUTerol/ipratropium for Nebulization 3 milliLiter(s) Nebulizer every 6 hours    acetaminophen    Suspension 650 milliGRAM(s) Oral every 6 hours PRN  diVALproex  milliGRAM(s) Oral two times a day    polyethylene glycol 3350 17 Gram(s) Oral at bedtime  aluminum hydroxide/magnesium hydroxide/simethicone Suspension 30 milliLiter(s) Oral every 6 hours PRN  pantoprazole    Tablet 40 milliGRAM(s) Oral before breakfast    atorvastatin 80 milliGRAM(s) Oral at bedtime  predniSONE   Tablet 40 milliGRAM(s) Oral daily    chlorhexidine 4% Liquid 1 Application(s) Topical daily  lactated ringers. 1000 milliLiter(s) IV Continuous <Continuous>      REVIEW OF SYSTEMS:  Complete 10point ROS negative.    PHYSICAL EXAM:  T(C): 36.8 (09-22-17 @ 06:03), Max: 37 (09-21-17 @ 14:16)  HR: 72 (09-22-17 @ 06:03) (72 - 104)  BP: 109/74 (09-22-17 @ 06:03) (81/45 - 138/99)  RR: 19 (09-22-17 @ 06:03) (14 - 24)  SpO2: 99% (09-22-17 @ 06:03) (94% - 100%)  Wt(kg): --  I&O's Summary    21 Sep 2017 07:01  -  22 Sep 2017 07:00  --------------------------------------------------------  IN: 530 mL / OUT: 0 mL / NET: 530 mL        Appearance: Normal	  HEENT:   Normal oral mucosa, PERRL, EOMI	  Lymphatic: No lymphadenopathy  Cardiovascular: Normal S1 S2, No JVD, No murmurs, No edema  Respiratory: Lungs clear to auscultation	  Psychiatry: A & O x 3, Mood & affect appropriate  Gastrointestinal:  Soft, Non-tender, + BS	  Skin: No rashes, No ecchymoses, No cyanosis	  Neurologic: Non-focal  Extremities: Normal range of motion, No clubbing, cyanosis or edema  Vascular: Peripheral pulses palpable 2+ bilaterally        LABS:	 	    CBC Full  -  ( 22 Sep 2017 06:17 )  WBC Count : 8.54 K/uL  Hemoglobin : 12.4 g/dL  Hematocrit : 39.3 %  Platelet Count - Automated : 132 K/uL  Mean Cell Volume : 89.3 fL  Mean Cell Hemoglobin : 28.2 pg  Mean Cell Hemoglobin Concentration : 31.6 %  Auto Neutrophil # : 5.98 K/uL  Auto Lymphocyte # : 1.79 K/uL  Auto Monocyte # : 0.70 K/uL  Auto Eosinophil # : 0.03 K/uL  Auto Basophil # : 0.01 K/uL  Auto Neutrophil % : 69.9 %  Auto Lymphocyte % : 21.0 %  Auto Monocyte % : 8.2 %  Auto Eosinophil % : 0.4 %  Auto Basophil % : 0.1 %    09-22    141  |  100  |  26<H>  ----------------------------<  97  5.1   |  28  |  1.21  09-21    142  |  99  |  20  ----------------------------<  90  4.4   |  30  |  1.07    Ca    9.2      22 Sep 2017 06:17  Ca    9.4      21 Sep 2017 06:50  Mg     2.1     09-22  Mg     2.0     09-21    TELEMETRY: 	  NSR/ST 70-130s.    	  ASSESSMENT/PLAN: 	    47M w/HTN, COPD, asthma, sarcoidosis with hx of pneumothoracies cardiology consulted for EKG changes course complicated by seizure activity in setting of R MCA and PCA infarcts now found to have large PFO on JONATAN w/normal LV function course c/b hemoptysis on AC for pAF.     #R. MCA and PCA infarcts with large PFO seen on JONATAN 9/18. No LV thrombus seen on JONATAN.  - AC currently on hold given hemoptysis, bronch demonstrated DAVID bleeding. Plan for IR embolization if recurrent hemoptysis. Thoracic consulted and pursuing CTA evaluation if need for lobectomy.   - Will discuss role for inpatient PFO closure to attempt to minimize pulmonary pressures with Dr. Jenkins and Alberto; Last TTE done unable to estimate RSVP given TR; Concerned that with amplatzer device, the patient would require 6 months of DAPT and with recent hemoptysis and DAVID bleeding seen on BAL, patient would be high risk for re bleed.    - c/w ASA and lipitor 	  - Will continue to follow    [id number] Date of Admission: 9/11/17     24H hour events:  Yesterday he underwent a bronchoscopy with BAL demonstrating DAVID bleeding.     MEDICATIONS:  aspirin  chewable 81 milliGRAM(s) Oral daily      ALBUTerol/ipratropium for Nebulization 3 milliLiter(s) Nebulizer every 6 hours PRN  buDESOnide  80 MICROgram(s)/formoterol 4.5 MICROgram(s) Inhaler 2 Puff(s) Inhalation two times a day  ALBUTerol/ipratropium for Nebulization 3 milliLiter(s) Nebulizer every 6 hours    acetaminophen    Suspension 650 milliGRAM(s) Oral every 6 hours PRN  diVALproex  milliGRAM(s) Oral two times a day    polyethylene glycol 3350 17 Gram(s) Oral at bedtime  aluminum hydroxide/magnesium hydroxide/simethicone Suspension 30 milliLiter(s) Oral every 6 hours PRN  pantoprazole    Tablet 40 milliGRAM(s) Oral before breakfast    atorvastatin 80 milliGRAM(s) Oral at bedtime  predniSONE   Tablet 40 milliGRAM(s) Oral daily    chlorhexidine 4% Liquid 1 Application(s) Topical daily  lactated ringers. 1000 milliLiter(s) IV Continuous <Continuous>      REVIEW OF SYSTEMS:  Complete 10point ROS negative.    PHYSICAL EXAM:  T(C): 36.8 (09-22-17 @ 06:03), Max: 37 (09-21-17 @ 14:16)  HR: 72 (09-22-17 @ 06:03) (72 - 104)  BP: 109/74 (09-22-17 @ 06:03) (81/45 - 138/99)  RR: 19 (09-22-17 @ 06:03) (14 - 24)  SpO2: 99% (09-22-17 @ 06:03) (94% - 100%)  Wt(kg): --  I&O's Summary    21 Sep 2017 07:01  -  22 Sep 2017 07:00  --------------------------------------------------------  IN: 530 mL / OUT: 0 mL / NET: 530 mL        Appearance: Normal	  HEENT:   Normal oral mucosa, PERRL, EOMI	  Lymphatic: No lymphadenopathy  Cardiovascular: Normal S1 S2, No JVD, No murmurs, No edema  Respiratory: Lungs clear to auscultation	  Psychiatry: A & O x 3, Mood & affect appropriate  Gastrointestinal:  Soft, Non-tender, + BS	  Skin: No rashes, No ecchymoses, No cyanosis	  Neurologic: Non-focal  Extremities: Normal range of motion, No clubbing, cyanosis or edema  Vascular: Peripheral pulses palpable 2+ bilaterally        LABS:	 	    CBC Full  -  ( 22 Sep 2017 06:17 )  WBC Count : 8.54 K/uL  Hemoglobin : 12.4 g/dL  Hematocrit : 39.3 %  Platelet Count - Automated : 132 K/uL  Mean Cell Volume : 89.3 fL  Mean Cell Hemoglobin : 28.2 pg  Mean Cell Hemoglobin Concentration : 31.6 %  Auto Neutrophil # : 5.98 K/uL  Auto Lymphocyte # : 1.79 K/uL  Auto Monocyte # : 0.70 K/uL  Auto Eosinophil # : 0.03 K/uL  Auto Basophil # : 0.01 K/uL  Auto Neutrophil % : 69.9 %  Auto Lymphocyte % : 21.0 %  Auto Monocyte % : 8.2 %  Auto Eosinophil % : 0.4 %  Auto Basophil % : 0.1 %    09-22    141  |  100  |  26<H>  ----------------------------<  97  5.1   |  28  |  1.21  09-21    142  |  99  |  20  ----------------------------<  90  4.4   |  30  |  1.07    Ca    9.2      22 Sep 2017 06:17  Ca    9.4      21 Sep 2017 06:50  Mg     2.1     09-22  Mg     2.0     09-21    TELEMETRY: 	  NSR/ST 70-130s.    	  ASSESSMENT/PLAN: 	    47M w/HTN, COPD, asthma, sarcoidosis with hx of pneumothoracies cardiology consulted for EKG changes course complicated by seizure activity in setting of R MCA and PCA infarcts now found to have large PFO on JONATAN w/normal LV function course c/b hemoptysis on AC for pAF.     #R. MCA and PCA infarcts with large PFO seen on JONATAN 9/18. No LV thrombus seen on JONATAN.  - AC currently on hold given hemoptysis, bronch demonstrated DAVID bleeding. Plan for IR embolization if recurrent hemoptysis. Thoracic consulted and pursuing CTA evaluation if need for lobectomy.   -No role for inpatient closure of PFO as it would significantly change pulmonary pressures since there is no shunt; Last TTE done unable to estimate RSVP given TR; Additionally amplatzer device require 6 months of DAPT and with recent hemoptysis and DAVID bleeding seen on BAL, patient would be high risk for re bleed.    - c/w ASA and lipitor 	  - Pending further thoracic/pulm/rheum eval, will decide when to safely restart AC.    - Will continue to follow    [id number]

## 2017-09-22 NOTE — PROGRESS NOTE ADULT - SUBJECTIVE AND OBJECTIVE BOX
OSCAR CHACKO  [id number]    INTERVAL HPI/OVERNIGHT EVENTS: No hemoptysis today. Bronch yesterday w/ active DAVID bleed. CT angio shows evidence of end stage sarcoidosis and a DAVID mycetoma. Denies CP, SOB, abd pain, N/V, diarrhea, joint or msk pain/stiffness.    MEDICATIONS  (STANDING):  atorvastatin 80 milliGRAM(s) Oral at bedtime  chlorhexidine 4% Liquid 1 Application(s) Topical daily  aspirin  chewable 81 milliGRAM(s) Oral daily  diVALproex  milliGRAM(s) Oral two times a day  buDESOnide  80 MICROgram(s)/formoterol 4.5 MICROgram(s) Inhaler 2 Puff(s) Inhalation two times a day  polyethylene glycol 3350 17 Gram(s) Oral at bedtime  pantoprazole    Tablet 40 milliGRAM(s) Oral before breakfast  ALBUTerol/ipratropium for Nebulization 3 milliLiter(s) Nebulizer every 6 hours  predniSONE   Tablet 40 milliGRAM(s) Oral daily  lactated ringers. 1000 milliLiter(s) (30 mL/Hr) IV Continuous <Continuous>    MEDICATIONS  (PRN):  acetaminophen    Suspension 650 milliGRAM(s) Oral every 6 hours PRN For Temp greater than 38 C (100.4 F)  ALBUTerol/ipratropium for Nebulization 3 milliLiter(s) Nebulizer every 6 hours PRN Shortness of Breath and/or Wheezing  aluminum hydroxide/magnesium hydroxide/simethicone Suspension 30 milliLiter(s) Oral every 6 hours PRN Dyspepsia      Allergies    No Known Allergies    Intolerances        Review of Systems:   General: No fevers/chills, no fatigue  HEENT: No blurry vision, dysphagia, or odynophagia  CVS: No CP/palpitations  Resp: Occasional SOB, +wheezing  GI: No N/V/C/D/abdominal pain  MSK:   Skin: No new rashes  Neuro: No headaches      Vital Signs Last 24 Hrs  T(C): 36.5 (22 Sep 2017 15:32), Max: 36.8 (22 Sep 2017 06:03)  T(F): 97.7 (22 Sep 2017 15:32), Max: 98.2 (22 Sep 2017 06:03)  HR: 72 (22 Sep 2017 15:32) (72 - 96)  BP: 99/65 (22 Sep 2017 15:32) (81/45 - 109/74)  BP(mean): --  RR: 18 (22 Sep 2017 15:32) (14 - 24)  SpO2: 97% (22 Sep 2017 15:32) (97% - 100%)    Physical Exam:  General: NAD, laying comfortably on bed.  HEENT: EOMI, MMM  Cardio: +S1/S2, RRR  Resp: +wheezing  GI: +BS, soft, NT/ND  MSK: Normal ROM, LUE str 3/5 (able to lift against gravity); LLE str 4/5.  Neuro: AAOx3, CN II-XII intact.  Psych: wnl    LABS:                        12.4   8.54  )-----------( 132      ( 22 Sep 2017 06:17 )             39.3     09-22    141  |  100  |  26<H>  ----------------------------<  97  5.1   |  28  |  1.21    Ca    9.2      22 Sep 2017 06:17  Mg     2.1     09-22              RADIOLOGY & ADDITIONAL TESTS:  < from: CT Angio Chest w/ IV Cont (09.22.17 @ 13:20) >  IMPRESSION:    Marked architectural distortion with cystic changes and traction   bronchiectasis in both upper lobes secondary to end-stage sarcoidosis.  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   Prominent intercostal arteries especially on the left side especially on   the left side with no evidence of active bleed.    < end of copied text >

## 2017-09-22 NOTE — PROGRESS NOTE ADULT - PROBLEM SELECTOR PLAN 1
no hemoptysis today or last night. Prior pulmonary arterial embolization in the left upper lobe artery: He was told if he hemoptysize again, he may need surgery: Off eliquis.

## 2017-09-22 NOTE — PROGRESS NOTE ADULT - PROBLEM SELECTOR PLAN 1
-Bronch DAVID bleeding await CTA to see if further intervention needed by IR monitor CBC   repeated hemoptysis with normal CXR and off eliquis  -H/H stable  -spoke to Dr. 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 Prior records in chart PFTs 2016 case d/w Dr. Rodrigue Caldera CT surgery will review PFTs in chart.  -case d/w Cards fellow poss for closure of PFO to minimize RT side pressures will review echo

## 2017-09-22 NOTE — PROGRESS NOTE ADULT - ASSESSMENT
This is a 47 year old AAM with PMH significant for Sarcoidosis , HTN, COPD, Pneumothorax x 3 who presented with left leg spasms and contractions and was found to have a large PFO on JONATAN and multiple areas of infarction seen on MRI. Rheumatology consulted due to positive HELENE of 1:160 w/o associated with no rashes, arthritis or uveitis. Rheumatologic w/u has been negative.    1) Positive HELENE  - history of sarcoidosis, no other history of autoimmune diseases  - denies arthritis, rashes, eye inflammation, blood clots in legs or lungs, red nodules on skin, morning stiffness, neck stiffness  - Physical: decreased strength over left side, no synovitis or joint tenderness, no rashes seen on skin or over shins( erythema nodosum)  - positive HELENE can be due to underlying viral, bacterial infections. 13 percent of people can have false positives in the population. If a family member has an autoimmune disease, another family member can incidentally have a positive HELENE with no symptomatology.  - C3, C4, Wade, RNP,dsDNA, Cardiolipin IgG and IgM, SSA all wnl  - SSB is positive at 1.0 of unclear significance  - f/u urine protein, urine creatinine, GBM antibodies    2) Sarcoidosis:  - per Pt, biopsy confirmed diagnosis in 2006 at [facility] and is seen regularly at [facility] 9/11 responder program by Pulmonologist Dr. Welsh.  - patient has history of being on chronic steroids  - Normal 1,25 vit D. Low 25 vit D  - Hepatitis panel negative, ACE level wnl  - f/u Quant Gold.  - consider cardiac MRI to evaluate for cardiac involvement from sarcoidosis    Rheum w/u thus far negative.  Discussed with Dr. Tsang, agrees with above plan. This is a 47 year old AAM with PMH significant for Sarcoidosis , HTN, COPD, Pneumothorax x 3 who presented with left leg spasms and contractions and was found to have a large PFO on JONATAN and multiple areas of infarction seen on MRI. Rheumatology consulted due to positive HELENE of 1:160 w/o associated with no rashes, arthritis or uveitis. Rheumatologic w/u has been negative.    1) Positive HELENE  - history of sarcoidosis, no other history of autoimmune diseases  - denies arthritis, rashes, eye inflammation, blood clots in legs or lungs, red nodules on skin, morning stiffness, neck stiffness  - Physical: decreased strength over left side, no synovitis or joint tenderness, no rashes seen on skin or over shins( erythema nodosum)  - positive HELENE can be due to underlying viral, bacterial infections. 13 percent of people can have false positives in the population. If a family member has an autoimmune disease, another family member can incidentally have a positive HELENE with no symptomatology.  - C3, C4, Wade, RNP,dsDNA, Cardiolipin IgG and IgM, SSA all wnl  - SSB is positive at 1.0 of unclear significance  - f/u urine protein, urine creatinine, GBM antibodies    2) Sarcoidosis:  - per Pt, biopsy confirmed diagnosis in 2006 at [facility] and is seen regularly at [facility] 9/11 responder program by Pulmonologist Dr. Welsh.  - patient has history of being on chronic steroids  - Normal 1,25 vit D. Low 25 vit D  - Hepatitis panel negative, ACE level wnl  - f/u Quant Gold.  - consider cardiac MRI to evaluate for cardiac involvement from sarcoidosis  - Dr. Godinez [phone]     Rheum w/u thus far negative.  Discussed with Dr. Tsang, agrees with above plan. This is a 47 year old AAM with PMH significant for Sarcoidosis , HTN, COPD, Pneumothorax x 3 who presented with left leg spasms and contractions and was found to have a large PFO on JONATAN and multiple areas of infarction seen on MRI. Rheumatology consulted due to positive HELENE of 1:160 w/o associated with no rashes, arthritis or uveitis. Rheumatologic w/u has been negative.    1) Positive HELENE  - history of sarcoidosis, no other history of autoimmune diseases  - denies arthritis, rashes, eye inflammation, blood clots in legs or lungs, red nodules on skin, morning stiffness, neck stiffness  - Physical: decreased strength over left side, no synovitis or joint tenderness, no rashes seen on skin or over shins( erythema nodosum)  - positive HELENE can be due to underlying viral, bacterial infections. 13 percent of people can have false positives in the population. If a family member has an autoimmune disease, another family member can incidentally have a positive HELENE with no symptomatology.  - C3, C4, Wade, RNP,dsDNA, Cardiolipin IgG and IgM, SSA all wnl  - SSB is positive at 1.0 of unclear significance  - f/u urine protein, urine creatinine, GBM antibodies    2) Sarcoidosis:  - per Pt, biopsy confirmed diagnosis in 2006 at [facility] and is seen regularly at [facility] 9/11 responder program by Pulmonologist Dr. Welsh.  - patient has history of being on chronic steroids but not on it consistently for many years.   - Normal 1,25 vit D. Low 25 vit D  - Hepatitis panel negative, ACE level wnl  - f/u Quant Gold.  - consider cardiac MRI to evaluate for cardiac involvement from sarcoidosis  - Dr. Godinez [phone]     Rheum w/u thus far negative.  Discussed with Dr. Tsang, agrees with above plan. This is a 47 year old AAM with PMH significant for Sarcoidosis , HTN, COPD, Pneumothorax x 3 who presented with left leg spasms and contractions and was found to have a large PFO on JONATAN and multiple areas of infarction seen on MRI. Rheumatology consulted due to positive HELENE of 1:160 w/o associated with no rashes, arthritis or uveitis. Rheumatologic w/u has been negative.    1) Positive HELENE  - history of sarcoidosis, no other history of autoimmune diseases  - denies arthritis, rashes, eye inflammation, blood clots in legs or lungs, red nodules on skin, morning stiffness, neck stiffness  - Physical: decreased strength over left side, no synovitis or joint tenderness, no rashes seen on skin or over shins( erythema nodosum)  - positive HELENE can be due to underlying viral, bacterial infections. 13 percent of people can have false positives in the population. If a family member has an autoimmune disease, another family member can incidentally have a positive HELENE with no symptomatology.  - C3, C4, Wade, RNP,dsDNA, Cardiolipin IgG and IgM, SSA all wnl  - SSB is positive at 1.0 of unclear significance  - f/u urine protein, urine creatinine, GBM antibodies    2) Sarcoidosis:  - per Pt, biopsy confirmed diagnosis in 2006 at [facility] and is seen regularly at [facility] 9/11 responder program by Pulmonologist Dr. Welsh.  - patient has history of being on chronic steroids but not on it consistently for many years. Has not been on any other DMARD or biologic therapy.   - Normal 1,25 vit D. Low 25 vit D  - Hepatitis panel negative, ACE level wnl  - f/u Quant Gold.  - consider cardiac MRI to evaluate for cardiac involvement from sarcoidosis  - Dr. Godinez [phone]     Rheum w/u thus far negative.  Discussed with Dr. Tsang, agrees with above plan.

## 2017-09-22 NOTE — PROGRESS NOTE ADULT - PROBLEM SELECTOR PLAN 3
WBC normal currently URI vs +HELENE rheumatology consult-possibly secondary to URI SSB slightly elevated of unclear significance. serology till date neg

## 2017-09-22 NOTE — PROGRESS NOTE ADULT - ASSESSMENT
47 year old -American male w/ hx sarcoid, asperigilloma initially presenting with LLE spasm. Hospital course complicated by an episode of unresponsiveness and gaze deviation concerning for seizure vs stroke. He was subsequently intubated for airway protection and extubated. Post extubation, mental status improved, though exam notable for L-sided weakness. EEG showed burst suppression pattern while on sedation started on antiepileptic +URI-enterovirus s/p ABx for poss lung infection. MRI brain showed bilateral MCA and right PCA infarcts (R>L) in the frontoparietal and occipital areas which could be cardioembolic in source. +new onset afib started on eliquis complicated by hemoptysis. now off eliquis   MRA head/neck is grossly unremarkable although limited by motion.  TTE done at bedside by ICU team showed positive bubble study with normal EF.  JONATAN- +PFO  LE doppler negative for DVT bilaterally

## 2017-09-22 NOTE — PROGRESS NOTE ADULT - SUBJECTIVE AND OBJECTIVE BOX
Subjective: "I feel ok" Pt. denies chest pain, SOB or hemoptysis overnight. Has only had a sip of water to eat or drink this morning.     Vital Signs:  Vital Signs Last 24 Hrs  T(C): 36.8 (09-22-17 @ 06:03), Max: 37 (09-21-17 @ 14:16)  T(F): 98.2 (09-22-17 @ 06:03), Max: 98.6 (09-21-17 @ 14:16)  HR: 72 (09-22-17 @ 06:03) (72 - 104)  BP: 109/74 (09-22-17 @ 06:03) (81/45 - 138/99)  RR: 19 (09-22-17 @ 06:03) (14 - 24)  SpO2: 99% (09-22-17 @ 06:03) (94% - 100%) on (O2)    Telemetry/Alarms:  General: WN/WD NAD  Neurology: Awake, nonfocal, BETH x 4  Eyes: Scleras clear, PERRLA/ EOMI, Gross vision intact  ENT:Gross hearing intact, grossly patent pharynx, no stridor  Neck: Neck supple, trachea midline, No JVD,   Respiratory: decreased  CV: Rythm irreg, S1S2  Abdominal: Soft, NT, ND +BS,   Extremities: No edema, + peripheral pulses  Skin: No Rashes, Hematoma, Ecchymosis  Lymphatic: No Neck, axilla, groin LAD  Psych: Oriented x 3, normal affect    Relevant labs, radiology and Medications reviewed                        12.4   8.54  )-----------( 132      ( 22 Sep 2017 06:17 )             39.3     09-22    141  |  100  |  26<H>  ----------------------------<  97  5.1   |  28  |  1.21    Ca    9.2      22 Sep 2017 06:17  Mg     2.1     09-22        MEDICATIONS  (STANDING):  atorvastatin 80 milliGRAM(s) Oral at bedtime  chlorhexidine 4% Liquid 1 Application(s) Topical daily  aspirin  chewable 81 milliGRAM(s) Oral daily  diVALproex  milliGRAM(s) Oral two times a day  buDESOnide  80 MICROgram(s)/formoterol 4.5 MICROgram(s) Inhaler 2 Puff(s) Inhalation two times a day  polyethylene glycol 3350 17 Gram(s) Oral at bedtime  pantoprazole    Tablet 40 milliGRAM(s) Oral before breakfast  ALBUTerol/ipratropium for Nebulization 3 milliLiter(s) Nebulizer every 6 hours  predniSONE   Tablet 40 milliGRAM(s) Oral daily  lactated ringers. 1000 milliLiter(s) (30 mL/Hr) IV Continuous <Continuous>    MEDICATIONS  (PRN):  acetaminophen    Suspension 650 milliGRAM(s) Oral every 6 hours PRN For Temp greater than 38 C (100.4 F)  ALBUTerol/ipratropium for Nebulization 3 milliLiter(s) Nebulizer every 6 hours PRN Shortness of Breath and/or Wheezing  aluminum hydroxide/magnesium hydroxide/simethicone Suspension 30 milliLiter(s) Oral every 6 hours PRN Dyspepsia    Pertinent Physical Exam  I&O's Summary    21 Sep 2017 07:01  -  22 Sep 2017 07:00  --------------------------------------------------------  IN: 530 mL / OUT: 0 mL / NET: 530 mL        Assessment  47y Male  w/ PAST MEDICAL & SURGICAL HISTORY:  History of pneumothorax: History of 3 prior pneumonthoracies.  COPD (chronic obstructive pulmonary disease)  Asthma  Hypertension  Sarcoidosis  No significant past surgical history  admitted with complaints of Patient is a 47y old  Male who presents with a chief complaint of "My leg wouldn't stop shaking." (14 Sep 2017 09:59)  47 year old male with a PMHx of sarcoidosis, HTN, asthma, and COPD presents to the ED with severe leg spasms and headache leading to a presyncopal episode, found to have a significant abnormal EKG and moderate inspiratory and expiratory wheezing in the setting of Entero/Rhino virus, admitted to tele for Near Syncope, r/o ACS and COPD exacerbation. Developed hemoptysis, CT scan with Mycetoma. S/p Bronch BAL with Dr. Arias on 9/21.     PLAN  Neuro: Pain management  Pulm: Encourage coughing, deep breathing and use of incentive spirometry. Wean off supplemental oxygen as able. Daily CXR. Discuseed with Dr. Granger from IR possible need for emergent embolization if significant hemoptysis returns. Will obtain urgent CT angio of the chest to eval vessels. Please obtain formal PFTs at [address] in Pulmonary office to see if pt. can tolerate lobectomy.   Cardio: Monitor telemetry/alarms. Cont care as per Cardiology. Please discuss considering repair of the PFO on the admission to help decrease chance of hemoptysis.   Renal: monitor urine output, supplement electrolytes as needed  Vasc: Heparin SC/SCDs for DVT prophylaxis  Heme: Stable H/H. No signs of active bleeding.    Therapy: OOB/ambulate  Disposition: May need possible lobectomy in future.   Discussed with Cardiothoracic Team Dr. Arias, patient, and Tele PA

## 2017-09-23 LAB
BASOPHILS # BLD AUTO: 0.02 K/UL — SIGNIFICANT CHANGE UP (ref 0–0.2)
BASOPHILS NFR BLD AUTO: 0.2 % — SIGNIFICANT CHANGE UP (ref 0–2)
BUN SERPL-MCNC: 21 MG/DL — SIGNIFICANT CHANGE UP (ref 7–23)
CALCIUM SERPL-MCNC: 9.1 MG/DL — SIGNIFICANT CHANGE UP (ref 8.4–10.5)
CHLORIDE SERPL-SCNC: 98 MMOL/L — SIGNIFICANT CHANGE UP (ref 98–107)
CO2 SERPL-SCNC: 30 MMOL/L — SIGNIFICANT CHANGE UP (ref 22–31)
CREAT SERPL-MCNC: 1.06 MG/DL — SIGNIFICANT CHANGE UP (ref 0.5–1.3)
EOSINOPHIL # BLD AUTO: 0.06 K/UL — SIGNIFICANT CHANGE UP (ref 0–0.5)
EOSINOPHIL NFR BLD AUTO: 0.6 % — SIGNIFICANT CHANGE UP (ref 0–6)
GLUCOSE SERPL-MCNC: 86 MG/DL — SIGNIFICANT CHANGE UP (ref 70–99)
HCT VFR BLD CALC: 39.1 % — SIGNIFICANT CHANGE UP (ref 39–50)
HCT VFR BLD CALC: 39.1 % — SIGNIFICANT CHANGE UP (ref 39–50)
HGB BLD-MCNC: 12.8 G/DL — LOW (ref 13–17)
HGB BLD-MCNC: 12.8 G/DL — LOW (ref 13–17)
IMM GRANULOCYTES # BLD AUTO: 0.02 # — SIGNIFICANT CHANGE UP
IMM GRANULOCYTES NFR BLD AUTO: 0.2 % — SIGNIFICANT CHANGE UP (ref 0–1.5)
LYMPHOCYTES # BLD AUTO: 2.21 K/UL — SIGNIFICANT CHANGE UP (ref 1–3.3)
LYMPHOCYTES # BLD AUTO: 23.9 % — SIGNIFICANT CHANGE UP (ref 13–44)
M TB TUBERC IFN-G BLD QL: 0 IU/ML — SIGNIFICANT CHANGE UP
M TB TUBERC IFN-G BLD QL: 0.09 IU/ML — SIGNIFICANT CHANGE UP
M TB TUBERC IFN-G BLD QL: NEGATIVE — SIGNIFICANT CHANGE UP
MAGNESIUM SERPL-MCNC: 1.9 MG/DL — SIGNIFICANT CHANGE UP (ref 1.6–2.6)
MCHC RBC-ENTMCNC: 29.1 PG — SIGNIFICANT CHANGE UP (ref 27–34)
MCHC RBC-ENTMCNC: 29.1 PG — SIGNIFICANT CHANGE UP (ref 27–34)
MCHC RBC-ENTMCNC: 32.7 % — SIGNIFICANT CHANGE UP (ref 32–36)
MCHC RBC-ENTMCNC: 32.7 % — SIGNIFICANT CHANGE UP (ref 32–36)
MCV RBC AUTO: 88.9 FL — SIGNIFICANT CHANGE UP (ref 80–100)
MCV RBC AUTO: 88.9 FL — SIGNIFICANT CHANGE UP (ref 80–100)
MITOGEN IGNF BCKGRD COR BLD-ACNC: 8.7 IU/ML — SIGNIFICANT CHANGE UP
MONOCYTES # BLD AUTO: 1.02 K/UL — HIGH (ref 0–0.9)
MONOCYTES NFR BLD AUTO: 11 % — SIGNIFICANT CHANGE UP (ref 2–14)
NEUTROPHILS # BLD AUTO: 5.91 K/UL — SIGNIFICANT CHANGE UP (ref 1.8–7.4)
NEUTROPHILS NFR BLD AUTO: 64.1 % — SIGNIFICANT CHANGE UP (ref 43–77)
NRBC # FLD: 0 — SIGNIFICANT CHANGE UP
NRBC # FLD: 0 — SIGNIFICANT CHANGE UP
PLATELET # BLD AUTO: 129 K/UL — LOW (ref 150–400)
PLATELET # BLD AUTO: 129 K/UL — LOW (ref 150–400)
PMV BLD: 13.9 FL — HIGH (ref 7–13)
PMV BLD: 13.9 FL — HIGH (ref 7–13)
POTASSIUM SERPL-MCNC: 4.1 MMOL/L — SIGNIFICANT CHANGE UP (ref 3.5–5.3)
POTASSIUM SERPL-SCNC: 4.1 MMOL/L — SIGNIFICANT CHANGE UP (ref 3.5–5.3)
RBC # BLD: 4.4 M/UL — SIGNIFICANT CHANGE UP (ref 4.2–5.8)
RBC # BLD: 4.4 M/UL — SIGNIFICANT CHANGE UP (ref 4.2–5.8)
RBC # FLD: 12.6 % — SIGNIFICANT CHANGE UP (ref 10.3–14.5)
RBC # FLD: 12.6 % — SIGNIFICANT CHANGE UP (ref 10.3–14.5)
SODIUM SERPL-SCNC: 140 MMOL/L — SIGNIFICANT CHANGE UP (ref 135–145)
WBC # BLD: 9.24 K/UL — SIGNIFICANT CHANGE UP (ref 3.8–10.5)
WBC # BLD: 9.24 K/UL — SIGNIFICANT CHANGE UP (ref 3.8–10.5)
WBC # FLD AUTO: 9.24 K/UL — SIGNIFICANT CHANGE UP (ref 3.8–10.5)
WBC # FLD AUTO: 9.24 K/UL — SIGNIFICANT CHANGE UP (ref 3.8–10.5)

## 2017-09-23 PROCEDURE — 99222 1ST HOSP IP/OBS MODERATE 55: CPT | Mod: GC

## 2017-09-23 PROCEDURE — 71010: CPT | Mod: 26

## 2017-09-23 PROCEDURE — 99233 SBSQ HOSP IP/OBS HIGH 50: CPT

## 2017-09-23 PROCEDURE — 99221 1ST HOSP IP/OBS SF/LOW 40: CPT | Mod: 25

## 2017-09-23 RX ORDER — VORICONAZOLE 10 MG/ML
400 INJECTION, POWDER, LYOPHILIZED, FOR SOLUTION INTRAVENOUS EVERY 12 HOURS
Qty: 0 | Refills: 0 | Status: DISCONTINUED | OUTPATIENT
Start: 2017-09-23 | End: 2017-09-24

## 2017-09-23 RX ORDER — ACETAMINOPHEN 500 MG
1000 TABLET ORAL ONCE
Qty: 0 | Refills: 0 | Status: COMPLETED | OUTPATIENT
Start: 2017-09-23 | End: 2017-09-29

## 2017-09-23 RX ORDER — VORICONAZOLE 10 MG/ML
200 INJECTION, POWDER, LYOPHILIZED, FOR SOLUTION INTRAVENOUS EVERY 12 HOURS
Qty: 0 | Refills: 0 | Status: DISCONTINUED | OUTPATIENT
Start: 2017-09-24 | End: 2017-09-24

## 2017-09-23 RX ORDER — PIPERACILLIN AND TAZOBACTAM 4; .5 G/20ML; G/20ML
3.38 INJECTION, POWDER, LYOPHILIZED, FOR SOLUTION INTRAVENOUS EVERY 8 HOURS
Qty: 0 | Refills: 0 | Status: DISCONTINUED | OUTPATIENT
Start: 2017-09-23 | End: 2017-10-03

## 2017-09-23 RX ADMIN — POLYETHYLENE GLYCOL 3350 17 GRAM(S): 17 POWDER, FOR SOLUTION ORAL at 22:26

## 2017-09-23 RX ADMIN — Medication 81 MILLIGRAM(S): at 11:18

## 2017-09-23 RX ADMIN — SODIUM CHLORIDE 30 MILLILITER(S): 9 INJECTION, SOLUTION INTRAVENOUS at 09:19

## 2017-09-23 RX ADMIN — DIVALPROEX SODIUM 500 MILLIGRAM(S): 500 TABLET, DELAYED RELEASE ORAL at 17:06

## 2017-09-23 RX ADMIN — PIPERACILLIN AND TAZOBACTAM 25 GRAM(S): 4; .5 INJECTION, POWDER, LYOPHILIZED, FOR SOLUTION INTRAVENOUS at 22:26

## 2017-09-23 RX ADMIN — Medication 40 MILLIGRAM(S): at 06:11

## 2017-09-23 RX ADMIN — PANTOPRAZOLE SODIUM 40 MILLIGRAM(S): 20 TABLET, DELAYED RELEASE ORAL at 06:11

## 2017-09-23 RX ADMIN — ATORVASTATIN CALCIUM 80 MILLIGRAM(S): 80 TABLET, FILM COATED ORAL at 22:26

## 2017-09-23 RX ADMIN — DIVALPROEX SODIUM 500 MILLIGRAM(S): 500 TABLET, DELAYED RELEASE ORAL at 06:11

## 2017-09-23 NOTE — PROGRESS NOTE ADULT - SUBJECTIVE AND OBJECTIVE BOX
Follow-up Pulm Progress Note/covering for Mehrishi   +  hemoptysis  aprox 3am= 1 tablespoon, at aprox 7am 1/4 cup, o2 sat 94% on 3lnc    Medications:  MEDICATIONS  (STANDING):  atorvastatin 80 milliGRAM(s) Oral at bedtime  chlorhexidine 4% Liquid 1 Application(s) Topical daily  aspirin  chewable 81 milliGRAM(s) Oral daily  diVALproex  milliGRAM(s) Oral two times a day  buDESOnide  80 MICROgram(s)/formoterol 4.5 MICROgram(s) Inhaler 2 Puff(s) Inhalation two times a day  polyethylene glycol 3350 17 Gram(s) Oral at bedtime  pantoprazole    Tablet 40 milliGRAM(s) Oral before breakfast  ALBUTerol/ipratropium for Nebulization 3 milliLiter(s) Nebulizer every 6 hours  predniSONE   Tablet 40 milliGRAM(s) Oral daily  lactated ringers. 1000 milliLiter(s) (30 mL/Hr) IV Continuous <Continuous>    MEDICATIONS  (PRN):  acetaminophen    Suspension 650 milliGRAM(s) Oral every 6 hours PRN For Temp greater than 38 C (100.4 F)  ALBUTerol/ipratropium for Nebulization 3 milliLiter(s) Nebulizer every 6 hours PRN Shortness of Breath and/or Wheezing  aluminum hydroxide/magnesium hydroxide/simethicone Suspension 30 milliLiter(s) Oral every 6 hours PRN Dyspepsia          Vital Signs Last 24 Hrs  T(C): 36.6 (23 Sep 2017 07:07), Max: 36.7 (22 Sep 2017 19:59)  T(F): 97.9 (23 Sep 2017 07:07), Max: 98.1 (23 Sep 2017 03:37)  HR: 91 (23 Sep 2017 07:07) (72 - 92)  BP: 114/82 (23 Sep 2017 07:07) (99/65 - 120/82)  BP(mean): --  RR: 16 (23 Sep 2017 07:07) (16 - 18)  SpO2: 99% (23 Sep 2017 07:07) (96% - 100%)              LABS:                        12.8   9.24  )-----------( 129      ( 23 Sep 2017 06:40 )             39.1     09-23    140  |  98  |  21  ----------------------------<  86  4.1   |  30  |  1.06    Ca    9.1      23 Sep 2017 06:40  Mg     1.9     09-23            CAPILLARY BLOOD GLUCOSE                HELENE -- 09-19 @ 06:30  Anti SS-1 <0.2  Anti SS-2 1.0  Anti RNP --  RF -- 09-19 @ 06:30    Atypical ANCA -- 09-19 @ 06:30  c-ANCA titer -- 09-19 @ 06:30  c-ANCA -- 09-19 @ 06:30  p-ANCA -- 09-19 @ 06:30  HELENE 1:160 09-14 @ 11:30  Anti SS-1 --  Anti SS-2 --  Anti RNP --  RF -- 09-14 @ 11:30    Atypical ANCA -- 09-14 @ 11:30  c-ANCA titer -- 09-14 @ 11:30  c-ANCA Negative 09-14 @ 11:30  p-ANCA Negative 09-14 @ 11:30              CULTURES: Culture - Acid Fast Smear Concentrated (09.21.17 @ 18:21)    Specimen Source: BRONCHIAL LAVAGE    Culture - Acid Fast Smear Concentrated:   AFB SMEAR= NO ACID FAST BACILLI SEEN    Culture - Respiratory with Gram Stain (09.21.17 @ 18:21)    Gram Stain Sputum:   WBC^White Blood Cells  QUANTITY OF BACTERIA SEEN: MODERATE (3+)  NOS^No Organisms Seen    Culture - Respiratory:   NO GROWTH - PRELIMINARY RESULTS    Specimen Source: BRONCHIAL LAVAGE    Culture - Acid Fast - Sputum w/Smear . (09.20.17 @ 15:09)    Culture - Acid Fast Smear Concentrated:   AFB SMEAR= NO ACID FAST BACILLI SEEN    Specimen Source: SPUTUM              Physical Examination:  PULM: coarse breath sounds throughout, + hemoptysis  CVS: S1, S2 heard    RADIOLOGY REVIEWED  CXR: 9/22 prelim/perihilar opacities    CT chest:< from: CT Angio Chest w/ IV Cont (09.22.17 @ 13:20) >  resolution CT, sagittal and coronal reformations and axial MIP images   were reconstructed.     FINDINGS:     AIRWAYS, LUNGS, PLEURA: Central tracheobronchial tree is patent. There is   marked architectural distortion and cyst formations predominantly in the   upper lung secondary to sarcoidosis. A suspicious intracavitary mycetoma   is noted in the posterior segment of left upper lobe with the partial   crescent sign (2:22). There is no evidence of active bleed within the   mycetoma.    MEDIASTINUM: No large mediastinal lymph nodes. Hypertrophic intracostal   arteries are seen in the mediastinum especially the left intercostal   artery .    HEART and VESSELS: Normal heart size. No pericardial effusion. Thoracic   aorta and main pulmonary artery are normal in caliber.     IMAGED ABDOMEN: Unremarkable.     SOFT TISSUES: No large axillary lymph nodes.    BONES: Unremarkable.      IMPRESSION:    Marked architectural distortion with cystic changes and traction   bronchiectasis in both upper lobes secondary to end-stage sarcoidosis.  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   Prominent intercostal arteries especially on the left side especially on   the left side with no evidence of active bleed.    < end of copied text >      TTE:

## 2017-09-23 NOTE — PROGRESS NOTE ADULT - SUBJECTIVE AND OBJECTIVE BOX
47 M PMH of sarcoidosis, HTN, COPD, and asthma who was initially admitted on 09/10 w cc of severe leg spasms X two days  In E.D. pt found to be wheezing, so he was seen by Pulmonary and started on IV Steroids for sarcoidosis  On 9/12 at 4 am, a RRT and Stroke codes were called as the patient became unresponsive.  He was transferred to MICU and then intubated for airway protection.   The patient was found to have a new infarct on MRI and also found to have a patent PFO on this admission as well. He was weaned off vent on 09/13 and transferred to the floors   There was concern patient was having paroxysmal afib, so he was started on Eliquis yesterday evening. He has received 2 doses of Eliquis on 09/19.   However, on 09/20, the patient had a coughing episode, which was not unusual for him, but during this episode he began to cough up fresh blood, about 1/2 cup of blood. He underwent Bronchoscopy with bronchoalveolar lavage of both sides and epi injection on 09/21 and was to have received further IR embolization for hemoptysis control in the nest few days.  However, he developed hemoptysis again and is now being transferred to CT ICU for observation.    Issues:  Hemoptysis, massive?              Aspergilloma              Sarcoidosis              PFO       Review of Systems:  As above + (on admission)  · Negative General Symptoms	no fever; no chills; no sweating; no weight loss	  · Negative Skin Symptoms	no rash; no itching; no tumor; no pitted nails	  · Skin Symptoms	dryness	  · Negative Ophthalmologic Symptoms	no diplopia; no photophobia; no blurred vision L; no blurred vision R; no pain L; no pain R	  · Negative ENMT Symptoms	no hearing difficulty; no ear pain; no tinnitus; no vertigo; no sinus symptoms; no nasal congestion; no post-nasal discharge	  · Negative Respiratory and Thorax Symptoms	no pleuritic chest pain	  · Respiratory and Thorax Symptoms	wheezing  dyspnea  cough	  · Negative Cardiovascular Symptoms	no chest pain; no palpitations; no orthopnea; no paroxysmal nocturnal dyspnea; no peripheral edema	  · Cardiovascular Symptoms	dyspnea on exertion	  · Negative Gastrointestinal Symptoms	no nausea; no vomiting; no diarrhea; no constipation; no change in bowel habits; no abdominal pain; no melena; no hematochezia; no jaundice	  · Negative Musculoskeletal Symptoms	no arthralgia; no joint swelling; no myalgia; no stiffness; no neck pain; no arm pain L; no arm pain R; no back pain; no leg pain L; no leg pain R	  · Musculoskeletal Symptoms	muscle cramps; muscle weakness	  · Negative Neurological Symptoms	no weakness	  · Neurological Symptoms	weakness; syncope; tremors; headache	  · Negative Psychiatric Symptoms	no depression; no anxiety; no insomnia; no memory loss	  · Negative Hematology Symptoms	no gum bleeding; no nose bleeding; no skin lumps	  · Negative Lymphatic Symptoms	no enlarged lymph nodes; no tender lymph nodes; no swelling of extremity	  · Negative Endocrine Symptoms	no cold intolerance; no heat intolerance; no voice change	  · Negative Allergic Reactions	no anaphylaxis; no respiratory distress; no photosensitivity; no urticaria	  · Negative Allergy Types	no outdoor environmental allergies; no indoor environmental allergies; no reactions to medicines; no reactions to food; no reactions to animals; no reactions to insect bites	  · Negative Immunological Symptoms	no recurring infections; no recurring pneumonia	    Past Medical History:  Asthma    COPD (chronic obstructive pulmonary disease)    History of pneumothorax  History of 3 prior pneumothoraces.  Hypertension    Sarcoidosis.     Past Surgical History:  No significant past surgical history.     Family History:  Father  Still living? No  Family history of essential hypertension, Age at diagnosis: Age Unknown  Family history of pancreatic cancer, Age at diagnosis: Age Unknown.     Social History:  Social History (marital status, living situation, occupation, tobacco use, alcohol and drug use, and sexual history): Patient denies any history of smoking, drinking alcohol, or participating in recreational drugs such as cocaine, heroin, or marijuana. Patient is  and lives at home with his wife.	     Tobacco Screening:  · Core Measure Site	No	  · Has the patient used tobacco in the past 30 days?	No	        Allergies and Intolerances:        Allergies:  	No Known Allergies:     Home Medications:   * Incomplete Medication History as of 11-Sep-2017 09:23 documented in Structured Notes  · 	Azithromycin 5 Day Dose Pack 250 mg oral tablet: 1 dose(s) orally once a day for respiratory infection, Last Dose Taken:    · 	predniSONE 50 mg oral tablet: 1 tab(s) orally once a day , Last Dose Taken:    · 	albuterol 1.25 mg/3 mL (0.042%) inhalation solution: 3 milliliter(s) inhaled 3 times a day, Last Dose Taken:    · 	losartan 25 mg oral tablet: 1 tab(s) orally 2 times a day, Last Dose Taken:    · 	Symbicort 160 mcg-4.5 mcg/inh inhalation aerosol: 2 puff(s) inhaled 2 times a day, Last Dose Taken:    · 	Incruse Ellipta: 1 puff(s) inhaled once a day, Last Dose Taken:      MEDICATIONS  (STANDING):  atorvastatin 80 milliGRAM(s) Oral at bedtime  chlorhexidine 4% Liquid 1 Application(s) Topical daily  aspirin  chewable 81 milliGRAM(s) Oral daily  diVALproex  milliGRAM(s) Oral two times a day  buDESOnide  80 MICROgram(s)/formoterol 4.5 MICROgram(s) Inhaler 2 Puff(s) Inhalation two times a day  polyethylene glycol 3350 17 Gram(s) Oral at bedtime  pantoprazole    Tablet 40 milliGRAM(s) Oral before breakfast  ALBUTerol/ipratropium for Nebulization 3 milliLiter(s) Nebulizer every 6 hours  predniSONE   Tablet 40 milliGRAM(s) Oral daily  lactated ringers. 1000 milliLiter(s) (30 mL/Hr) IV Continuous <Continuous>  voriconazole 400 milliGRAM(s) Oral every 12 hours  piperacillin/tazobactam IVPB. 3.375 Gram(s) IV Intermittent every 8 hours    MEDICATIONS  (PRN):  acetaminophen    Suspension 650 milliGRAM(s) Oral every 6 hours PRN For Temp greater than 38 C (100.4 F)  ALBUTerol/ipratropium for Nebulization 3 milliLiter(s) Nebulizer every 6 hours PRN Shortness of Breath and/or Wheezing  aluminum hydroxide/magnesium hydroxide/simethicone Suspension 30 milliLiter(s) Oral every 6 hours PRN Dyspepsia  acetaminophen  IVPB. 1000 milliGRAM(s) IV Intermittent once PRN Moderate Pain (4 - 6)      ICU Vital Signs Last 24 Hrs  T(C): 37 (23 Sep 2017 14:41), Max: 37 (23 Sep 2017 14:41)  T(F): 98.6 (23 Sep 2017 14:41), Max: 98.6 (23 Sep 2017 14:41)  HR: 73 (23 Sep 2017 20:00) (70 - 92)  BP: 107/71 (23 Sep 2017 20:00) (100/61 - 134/94)  BP(mean): 79 (23 Sep 2017 20:00) (78 - 79)    RR: 16 (23 Sep 2017 20:00) (16 - 22)  SpO2: 98% (23 Sep 2017 20:00) (96% - 99%)      Physical exam:                                                   Neuro:              Alert & Oriented X 4                           Cardiovascular:  S1 & S2, regular                           Respiratory:      Air entry is fair and equal on both sides, has bilateral conducted sounds + rhonchi /rales                          GI:                   Flat  Soft, nondistended and nontender, Bowel sounds active                                 Ext:                  No cyanosis or edema,       Labs:                                                                           12.8   9.24  )-----------( 129      ( 23 Sep 2017 06:40 )             39.1                            09-23    140  |  98  |  21  ----------------------------<  86  4.1   |  30  |  1.06    Ca    9.1      23 Sep 2017 06:40  Mg     1.9     09-23    Plan:  47 M PMH of sarcoidosis, HTN, COPD, and asthma who was initially admitted on 09/10 w cc of severe leg spasms X two days  In E.D. pt found to be wheezing, so he was seen by Pulmonary and started on IV Steroids for sarcoidosis  On 9/12 at 4 am, a RRT and Stroke codes were called as the patient became unresponsive.  He was transferred to MICU and then intubated for airway protection.   The patient was found to have a new infarct on MRI and also found to have a patent PFO on this admission as well. He was weaned off vent on 09/13 and transferred to the floors   There was concern patient was having paroxysmal afib, so he was started on Eliquis yesterday evening. He has received 2 doses of Eliquis on 09/19.   However, on 09/20, the patient had a coughing episode, which was not unusual for him, but during this episode he began to cough up fresh blood, about 1/2 cup of blood. He underwent Bronchoscopy with bronchoalveolar lavage of both sides and epi injection on 09/21 and was to have received further IR embolization for hemoptysis control in the nest few days.  However, he developed hemoptysis again and is now being transferred to CT ICU for observation.    Issues:  Hemoptysis, massive?              Aspergilloma              Sarcoidosis              PFO                            Neuro:                                         Pain, if any, control with ylenol IV / Toradol / Percocet,                             Cardiovascular:                                          Continue hemodynamic monitoring.                                         Not on any pressors                                         Continue cardiovascular / antihypertensive medications                            Respiratory:                                         Pt is on  nasal canula                                          Comfortable, not in any distress.                                         Continue bronchodilators, pulmonary toilet                            GI  	                                         Absolute NPO                                  Renal:                                         Continue IVF                                         Monitor I/Os and electrolytes                                                   Hematologic / Oncology:                                                                                  Follow CBC                            Infectious disease:                                            No signs of infection. Monitor for fever / leukocytosis.                              Endocrine:                                             Continue Finger stick glucose checks with coverage    All clinical, lab, hemodynamic and radiographic data were reviewed. Pts current status discussed with pt's family at bedside.    I have spent 45 minutes of critical care time with this patient between 7 pm and 11 pm.    Prosper Wen MD

## 2017-09-23 NOTE — PROGRESS NOTE ADULT - PROBLEM SELECTOR PLAN 1
no hemoptysis today or last night. Prior pulmonary arterial embolization in the left upper lobe artery: He was told if he hemoptysize again, he may need surgery: Off eliquis.  9/21-s/p bronch-with bleeding noted in [facility]- reported plan for IR embo if hemoptysis again  9/23 + hemoptysis, HD stable, o2 sat 94% on 3l  CTA-suspicious intracavity mycetoma  off eliquis and plavix  spoke with thoracic pa-Thoracic attending will see pt  await thoracic surgery plan no hemoptysis today or last night. Prior pulmonary arterial embolization in the left upper lobe artery: He was told if he hemoptysize again, he may need surgery: Off eliquis.  9/21-s/p bronch-with bleeding noted in [facility]- reported plan for IR embo if hemoptysis again  9/23 + hemoptysis, HD stable, o2 sat 94% on 3l  CTA-suspicious intracavity mycetoma  off eliquis and plavix  spoke with thoracic pa-Thoracic attending will see pt  await thoracic surgery plan  ID consult : hemoptysis -eval for GN/pseudomonal coverage and ?fungal coverage given mycetoma seen on CT no hemoptysis today or last night. Prior pulmonary arterial embolization in the left upper lobe artery: He was told if he hemoptysize again, he may need surgery: Off eliquis.  9/21-s/p bronch-with bleeding noted in [facility]- reported plan for IR embo if hemoptysis again  9/23 + hemoptysis, HD stable, o2 sat 94% on 3l  CTA-suspicious intracavity mycetoma  off eliquis and plavix  spoke with thoracic pa-Thoracic attending will see pt  await thoracic surgery plan  ID consult : hemoptysis -eval for GN/pseudomonal coverage and ?fungal coverage given mycetoma seen on CT/d/w 4 [facility]

## 2017-09-23 NOTE — PROGRESS NOTE ADULT - PROBLEM SELECTOR PLAN 3
-c/w Eliasacon -likely excerbated by URI  -prednisone 40mg daily (Currently on day 3 of 5)  - Feliz BENNETT

## 2017-09-23 NOTE — PROGRESS NOTE ADULT - PROBLEM SELECTOR PLAN 3
wheezing is better today: but still wheezing: started him on steroids again  OK to hold off nebulizers for now as it makes his cough worse

## 2017-09-23 NOTE — PROGRESS NOTE ADULT - PROBLEM SELECTOR PLAN 1
Patient s/p Bronch DAVID bleeding. Pt continues to have hemoptysis with normal CXR and currently off eliquis. H/H stable  Transfer pt to CTICU for monitoring  keep NPO - monitor further hemoptysis  continue to hold anticoagulation  plan for OR today or tomorrow morning as per CT surgery team Patient s/p Bronch DAVID bleeding. Pt continues to have hemoptysis with normal CXR and currently off eliquis. H/H stable  Transfer pt to CTICU for monitoring  keep NPO - monitor further hemoptysis  continue to hold anticoagulation  plan for OR today or tomorrow morning as per CT surgery team  ID consult for pre-operative antimicrobial recommendations given concern for intracavitary mycetoma

## 2017-09-23 NOTE — PROGRESS NOTE ADULT - SUBJECTIVE AND OBJECTIVE BOX
Pt seen and examined with Dr Alvarado     Subjective: "I coughed up blood a few times last night" Pt admits hemoptysis 1tsp 3am and 1/4 cup at 7am.    Vital Signs:  Vital Signs Last 24 Hrs  T(C): 37 (09-23-17 @ 14:41), Max: 37 (09-23-17 @ 14:41)  T(F): 98.6 (09-23-17 @ 14:41), Max: 98.6 (09-23-17 @ 14:41)  HR: 72 (09-23-17 @ 14:41) (72 - 92)  BP: 113/73 (09-23-17 @ 14:41) (100/61 - 134/94)  RR: 16 (09-23-17 @ 14:41) (16 - 18)  SpO2: 98% (09-23-17 @ 14:41) (96% - 100%) on (O2)      General: WN/WD NAD  Neurology: Awake, nonfocal, BETH x 4  Eyes: Scleras clear, PERRLA/ EOMI, Gross vision intact  ENT:Gross hearing intact, grossly patent pharynx, no stridor  Neck: Neck supple, trachea midline, No JVD,   Respiratory: CTA B/L, No wheezing, rales, rhonchi  CV: RRR, S1S2, no murmurs, rubs or gallops  Abdominal: Soft, NT, ND +BS,   Extremities: No edema, + peripheral pulses  Skin: No Rashes, Hematoma, Ecchymosis  Lymphatic: No Neck, axilla, groin LAD  Psych: Oriented x 3, normal affect    Relevant labs, radiology and Medications reviewed                        12.8   9.24  )-----------( 129      ( 23 Sep 2017 06:40 )             39.1     09-23    140  |  98  |  21  ----------------------------<  86  4.1   |  30  |  1.06    Ca    9.1      23 Sep 2017 06:40  Mg     1.9     09-23        MEDICATIONS  (STANDING):  atorvastatin 80 milliGRAM(s) Oral at bedtime  chlorhexidine 4% Liquid 1 Application(s) Topical daily  aspirin  chewable 81 milliGRAM(s) Oral daily  diVALproex  milliGRAM(s) Oral two times a day  buDESOnide  80 MICROgram(s)/formoterol 4.5 MICROgram(s) Inhaler 2 Puff(s) Inhalation two times a day  polyethylene glycol 3350 17 Gram(s) Oral at bedtime  pantoprazole    Tablet 40 milliGRAM(s) Oral before breakfast  ALBUTerol/ipratropium for Nebulization 3 milliLiter(s) Nebulizer every 6 hours  predniSONE   Tablet 40 milliGRAM(s) Oral daily  lactated ringers. 1000 milliLiter(s) (30 mL/Hr) IV Continuous <Continuous>    MEDICATIONS  (PRN):  acetaminophen    Suspension 650 milliGRAM(s) Oral every 6 hours PRN For Temp greater than 38 C (100.4 F)  ALBUTerol/ipratropium for Nebulization 3 milliLiter(s) Nebulizer every 6 hours PRN Shortness of Breath and/or Wheezing  aluminum hydroxide/magnesium hydroxide/simethicone Suspension 30 milliLiter(s) Oral every 6 hours PRN Dyspepsia    Pertinent Physical Exam  I&O's Summary      Assessment  47y Male  w/ PAST MEDICAL & SURGICAL HISTORY:  History of pneumothorax: History of 3 prior pneumonthoracies.  COPD (chronic obstructive pulmonary disease)  Asthma  Hypertension  Sarcoidosis  No significant past surgical history  admitted with complaints of Patient is a 47y old  Male who presents with a chief complaint of "My leg wouldn't stop shaking." (14 Sep 2017 09:59)  .  On (Date), patient underwent Bronchoscopy with bronchoalveolar lavage of both sides  . Postoperative course/issues: pt with known sarcoidosis complicated by hemoptysis in the past. Pt admits embolization procedures have worked previously for him but hemoptysis returns.     PLAN  Transfer pt to CTICU for monitoring  keep NPO - monitor further hemoptysis  continue to hold anticoagulation  plan for OR this afternoon or tomorrow morning    Neuro: Pain management  Pulm: Encourage coughing, deep breathing and use of incentive spirometry. Wean off supplemental oxygen as able. Daily CXR.   Cardio: Monitor telemetry/alarms  GI: Tolerating diet. Continue stool softeners.  Renal: monitor urine output, supplement electrolytes as needed  Heme: Stable H/H. .   ID: Off antibiotics. Stable.  Therapy: OOB/ambulate    Discussed with Cardiothoracic Team at AM rounds.

## 2017-09-23 NOTE — CONSULT NOTE ADULT - ASSESSMENT
48 yo M with PMH Sarcoidosis, Asthma, COPD, Pneumothorax x3 who was admitted for presyncopal episode and cough associated with blood tinged sputum. Hospital course complicated by multifocal CVA and persistent hemoptysis after initiation of Eliquis. S/P Bronchoscopy on 9/21 with BAL cultures growing normal respiratory gracie. 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 Per patient he was diagnosed with pulmonary aspergillosis at [place] on biopsy (which ultimately was complicated by post-op hemoptysis). While the definitive treatment of hemoptysis secondary to aspergilloma/invasive cavitary aspergillus is resection we can start Voriconazole now empirically to decrease likelihood of post-operative aspergillus empyema.   --Start Zosyn 3.375 mg IV Q8H  --Start Voriconazole 400 mg PO Q12 x1 followed by 200 mg PO Q12H  --After resection please send for pathology, standard bacterial cultures, AFB and Fungal. 48 yo M with PMH Sarcoidosis, Asthma, COPD, Pneumothorax x3 who was admitted for presyncopal episode and cough associated with blood tinged sputum. Hospital course complicated by multifocal CVA and persistent hemoptysis after initiation of Eliquis. S/P Bronchoscopy on 9/21 with BAL cultures growing normal respiratory gracie. 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 Per patient he was diagnosed with pulmonary aspergillosis at [place] on biopsy (which ultimately was complicated by post-op hemoptysis). While the definitive treatment of hemoptysis secondary to aspergilloma/invasive cavitary aspergillus is surgical resection we can start Voriconazole now empirically to decrease likelihood of post-operative aspergillus empyema.   --Start Zosyn 3.375 mg IV Q8H  --Start Voriconazole 400 mg PO Q12 (x2 doses) followed by 200 mg PO Q12H  --After resection please send for pathology, standard bacterial cultures, AFB and Fungal.

## 2017-09-23 NOTE — PROGRESS NOTE ADULT - SUBJECTIVE AND OBJECTIVE BOX
CC: "My leg wouldn't stop shaking." Hospital course complicated by persistent hemoptysis, new onset afib, and acute bilateral MCA and right PCA infarcts    SUBJECTIVE / OVERNIGHT EVENTS:  Patient seen and evaluated at bedside earlier today. Pt's girlfriend/HCP present as well. Patient reports feeling ok. Noted dark red blood in cups from episode of hemoptysis early this morning. Patient denies SOB or chest pain. Reports gaining more strength in RUE but still significantly weak.    MEDICATIONS  (STANDING):  atorvastatin 80 milliGRAM(s) Oral at bedtime  chlorhexidine 4% Liquid 1 Application(s) Topical daily  aspirin  chewable 81 milliGRAM(s) Oral daily  diVALproex  milliGRAM(s) Oral two times a day  buDESOnide  80 MICROgram(s)/formoterol 4.5 MICROgram(s) Inhaler 2 Puff(s) Inhalation two times a day  polyethylene glycol 3350 17 Gram(s) Oral at bedtime  pantoprazole    Tablet 40 milliGRAM(s) Oral before breakfast  ALBUTerol/ipratropium for Nebulization 3 milliLiter(s) Nebulizer every 6 hours  predniSONE   Tablet 40 milliGRAM(s) Oral daily  lactated ringers. 1000 milliLiter(s) (30 mL/Hr) IV Continuous <Continuous>    MEDICATIONS  (PRN):  acetaminophen    Suspension 650 milliGRAM(s) Oral every 6 hours PRN For Temp greater than 38 C (100.4 F)  ALBUTerol/ipratropium for Nebulization 3 milliLiter(s) Nebulizer every 6 hours PRN Shortness of Breath and/or Wheezing  aluminum hydroxide/magnesium hydroxide/simethicone Suspension 30 milliLiter(s) Oral every 6 hours PRN Dyspepsia      Vital Signs Last 24 Hrs  T(C): 37 (23 Sep 2017 14:41), Max: 37 (23 Sep 2017 14:41)  T(F): 98.6 (23 Sep 2017 14:41), Max: 98.6 (23 Sep 2017 14:41)  HR: 72 (23 Sep 2017 14:41) (72 - 92)  BP: 113/73 (23 Sep 2017 14:41) (100/61 - 134/94)  BP(mean): --  RR: 16 (23 Sep 2017 14:41) (16 - 18)  SpO2: 98% (23 Sep 2017 14:41) (96% - 100%)      PHYSICAL EXAM  GENERAL: NAD, On supplemental O2 via NC  CHEST/LUNG: Diffuse wheezes in all lung fields, louder in upper lung fields, L>R  HEART: Regular rate and rhythm; No murmurs, rubs, or gallops  ABDOMEN: Soft, Nontender, Nondistended; Bowel sounds present  EXTREMITIES:  2+ Peripheral Pulses, No clubbing, cyanosis, or edema  PSYCH: AAOx3  NEURO: LUE weakness, poor hand . Able to lift left arm against gravity. Sensation intact.      LABS:                        12.8   9.24  )-----------( 129      ( 23 Sep 2017 06:40 )             39.1     09-23    140  |  98  |  21  ----------------------------<  86  4.1   |  30  |  1.06    Ca    9.1      23 Sep 2017 06:40  Mg     1.9     09-23    Imaging Personally Reviewed: Yes  Consultant(s) Notes Reviewed:  Yes  Care Discussed with Consultants/Other Providers: YEs

## 2017-09-23 NOTE — CHART NOTE - NSCHARTNOTEFT_GEN_A_CORE
RN notified that patient had two episodes of hemoptysis. Patient with hx of hemoptysis and s/p bronch two days ago.  First episode was approximately 2-3 mL and patient coughed multiple times after that without any hemoptysis. Second episode was few hours later where the hemoptysis amount was much less than the first episode. Patient was seen at bedside. He has no complaints other than he coughs intermittently. Patient also had CTA chest done yesterday which showed no signs of active bleeding.     T(C): 36.7 (09-23-17 @ 03:37), Max: 36.8 (09-22-17 @ 06:03)  HR: 74 (09-23-17 @ 03:37) (72 - 92)  BP: 100/61 (09-23-17 @ 03:37) (99/65 - 120/82)  RR: 18 (09-23-17 @ 03:37) (18 - 19)  SpO2: 99% (09-23-17 @ 03:37) (96% - 100%)  Wt(kg): --    As per CT surgery note from 9/23 patient may need IR embolization if he has significant hemoptysis. Given his symptoms are self resolving, vitals are stable and he continues to cough without any hemoptysis. So will monitor now. Monitor closely. Discussed with hospitalist on call.

## 2017-09-23 NOTE — PROGRESS NOTE ADULT - PROBLEM SELECTOR PLAN 2
bilateral MCA and right PCA infarcts (R>L) in the frontoparietal and occipital areas which could be cardioembolic in source episode of afib.  JONATAN-+PFO closure being discussed  -EP reviewed EKG on 9/12 afib  ASA 81 mg PO QD  -likely embolic  Lipitor 80 mg PO QHS   MuD4g-0.8 LDL-113  Tele monitor  PT/OT eval-[facility]MR acute rehab bilateral MCA and right PCA infarcts (R>L) in the frontoparietal and occipital areas which could be cardioembolic in source episode of afib.  JONATAN-+PFO closure being discussed  -EP reviewed EKG on 9/12 afib  ASA 81 mg PO QD  -likely embolic  Lipitor 80 mg PO QHS   WrV3f-8.8 LDL-113  Tele monitor  PT/OT eval-PMR acute rehab  - continue Depakote for seizure prophylaxis

## 2017-09-23 NOTE — PROGRESS NOTE ADULT - SUBJECTIVE AND OBJECTIVE BOX
Neurology Progress    OSCAR [id number]    HPI:  Patient is a 47 year old male with a PMHx of sarcoidosis, HTN, COPD, and asthma who presents to the ED with severe leg spasms. These intermittent left leg spasms began suddenly two days ago in the patient's home and have become more regular and worsening in contracture. The spasm began in the quad leading to severe erratic left leg movements that can last from seconds to minutes. Once the leg spasm and contracture stops, an "intense tingling and pain sensation" which starts in the foot and radiates up the left side of his body to his neck. Once the tingling sensation reached his neck, the patient became faint, but did not fully lose consciousness. The patient also had a HA at the time of the spasm and tingling which was diffuse throughout the head and rated a 10/10 on a pain scale. He denied ever having this severe of a HA or leg spasm prior this incident; the HA started yesterday and is still present today. Patient denies fever, chills, N/V/D, orthopnea, NPD, sputum, chest pain, leg swelling, and abdominal pain.     In E.D. pt found to have an abnormal EKG, where house cardiology for consulted.   On admission pt found to have moderate inspiratory and expiratory wheezes. (11 Sep 2017 07:22)      Past Medical History  History of pneumothorax  COPD (chronic obstructive pulmonary disease)  Asthma  Hypertension  Sarcoidosis      Past Surgical History  No significant past surgical history      MEDICATIONS    atorvastatin 80 milliGRAM(s) Oral at bedtime  acetaminophen    Suspension 650 milliGRAM(s) Oral every 6 hours PRN  chlorhexidine 4% Liquid 1 Application(s) Topical daily  ALBUTerol/ipratropium for Nebulization 3 milliLiter(s) Nebulizer every 6 hours PRN  aspirin  chewable 81 milliGRAM(s) Oral daily  diVALproex  milliGRAM(s) Oral two times a day  buDESOnide  80 MICROgram(s)/formoterol 4.5 MICROgram(s) Inhaler 2 Puff(s) Inhalation two times a day  polyethylene glycol 3350 17 Gram(s) Oral at bedtime  aluminum hydroxide/magnesium hydroxide/simethicone Suspension 30 milliLiter(s) Oral every 6 hours PRN  pantoprazole    Tablet 40 milliGRAM(s) Oral before breakfast  ALBUTerol/ipratropium for Nebulization 3 milliLiter(s) Nebulizer every 6 hours  predniSONE   Tablet 40 milliGRAM(s) Oral daily  lactated ringers. 1000 milliLiter(s) IV Continuous <Continuous>         Family history: No history of dementia, strokes, or seizures   FAMILY HISTORY:  Family history of pancreatic cancer (Father)  Family history of essential hypertension (Father)    SOCIAL HISTORY -- No history of tobacco or alcohol use     Allergies    No Known Allergies    Intolerances            Vital Signs Last 24 Hrs  T(C): 36.6 (23 Sep 2017 07:07), Max: 36.7 (22 Sep 2017 19:59)  T(F): 97.9 (23 Sep 2017 07:07), Max: 98.1 (23 Sep 2017 03:37)  HR: 91 (23 Sep 2017 07:07) (72 - 92)  BP: 114/82 (23 Sep 2017 07:07) (99/65 - 120/82)  BP(mean): --  RR: 16 (23 Sep 2017 07:07) (16 - 18)  SpO2: 99% (23 Sep 2017 07:07) (96% - 100%)        On Neurological Examination:    Mental Status - Patient is alert, awake, oriented X3. .   Follows commands well and able to answer questions appropriately. Mood and affect  normal  Follow simple commands able to repeat  able to name.  Speech - Fluent no Dysarthria  no  Aphasia                              Cranial Nerves - Extraocular muscle intact  GIOVANI Facial symmetry Tongue midline, CnV1to V3 intact gross hearing intact      Motor Exam -   Right upper  5/5 throughout  Left upper 3/5 throughtout  Right lower- 5/5 throughout  Left lower 4/5 throughout  Coordination -finger to nose intact  Muscle tone - is normal all over. No asymmetry is seen.      Sensory    Bilateral intact to light touch    Gait -  normal  no ataxia     GENERAL Exam:     Nontoxic , No Acute Distress   	  HEENT:  normocephalic, atraumatic  		  LUNGS:	Clear bilaterally  No Wheeze      VASCULAR: no carotid brui  	  HEART:	 Normal S1S2   No murmur RRR        	  MUSCULOSKELETAL: Normal Range of Motion  	   SKIN:      Normal   No Ecchymosis               LABS:  CBC Full  -  ( 23 Sep 2017 06:40 )  WBC Count : 9.24 K/uL  Hemoglobin : 12.8 g/dL  Hematocrit : 39.1 %  Platelet Count - Automated : 129 K/uL  Mean Cell Volume : 88.9 fL  Mean Cell Hemoglobin : 29.1 pg  Mean Cell Hemoglobin Concentration : 32.7 %  Auto Neutrophil # : 5.91 K/uL  Auto Lymphocyte # : 2.21 K/uL  Auto Monocyte # : 1.02 K/uL  Auto Eosinophil # : 0.06 K/uL  Auto Basophil # : 0.02 K/uL  Auto Neutrophil % : 64.1 %  Auto Lymphocyte % : 23.9 %  Auto Monocyte % : 11.0 %  Auto Eosinophil % : 0.6 %  Auto Basophil % : 0.2 %      09-23    140  |  98  |  21  ----------------------------<  86  4.1   |  30  |  1.06    Ca    9.1      23 Sep 2017 06:40  Mg     1.9     09-23      Hemoglobin A1C:       Vitamin B12         RADIOLOGY        CRPROCEDURE DATE:  Sep 19 2017         INTERPRETATION:  History: Sarcoidosis. Unresponsive. Seizures.    MRI the brain was performed using sagittal T1, axial T 1 fast and echo   T2-weighted sequence of FLAIR diffusion and susceptibility weighted   sequence. Coronal T2 and T2 FLAIR sequences were performed as well. The   patient was injected with approximately 9.5 cc of Gadavist IV with 0.5 cc   conscious discarded. Sagittal coronal and axial T1-weighted sequences   were performed.    This exam is compared with prior contrast-enhanced brain MRI performed on   September 13, 2017.    The lateral ventricles have a normal configuration. Again seen are   gyriform type of enhancement involving the high bilateral frontal   parietal region (right greater than left), and right occipital cortex.   These findings again demonstrate increased signal on the   diffusion-weighted sequence. There is slight increase gyriform type of   enhancement now identified when compared the prior exam. These findings   could be compatible with areas of subacute infarcts and PRESS, though can   also be related to sarcoidosis given patient's history. Clinical   correlation continued close no follow-up is recommended. Subtle areaof   increased signal is now seen involving the right linda and cerebral   peduncle on the diffusion-weighted sequence.    There is no evidence of acute hemorrhage in the posterior fossa   supratentorial region. There is no evidence of acute hemorrhage in   posterior fossa or supratentorial region.    There are no abnormal intra-axial or extra-axial collections seen    Large vessels and trait normal flow voids    Bilateral sphenoid, right ethmoid sinus mucosal thickening is seen. Small   air-fluidlevel seen involving left maxillary sinus.    Impression: Abnormal T2 prolongation with increased signal on the   diffusion-weighted sequence is again identified in the supratentorial   region. These areas now demonstrate increased gyriform type of   enhancement. These could be compatible areas of subacute infarcts as well   as Press though can also be related to patient's known sarcoidosis.   Clinical correlation and continued close interval follow-up is   recommended.                        MAYRA SANDY M.D., ATTENDING RADIOLOGIST  This document has been electronically signed. Sep 19 2017  9:09AM        < end of copied text >    EKG      IMPRESSION  This is a 47 year old with left sided weakness.  Repeat MRI not clear if PRES vs Sarcoid      repeat MRI in few weeks.      rehab     eliquis for AFIB      PT/rehab          schoenberg

## 2017-09-23 NOTE — PROGRESS NOTE ADULT - PROBLEM SELECTOR PLAN 4
-likely excerbated by URI  -prednisone 40mg daily (Currently on day 3 of 5)  - Feliz BENNETT venodyne boots in light of hemoptysis

## 2017-09-23 NOTE — CONSULT NOTE ADULT - SUBJECTIVE AND OBJECTIVE BOX
HPI:    46 yo M with PMH Sarcoidosis, Asthma, COPD, Pneumothorax x3 who was seen in the ED on 9/10 for presyncopal episode and cough associated with blood tinged sputum. Received Zosyn from 9/12-9/17 and Azithromycin from 9/11-9/13. RVP resulted as positive for Rhinovirus. On 9/12 he became unresponsive and had eyes deviated to the right, urinary incontinence and tongue trauma and thus was felt to have seizure but with evidence of acute infarction involving the right greater than left frontal, parietal, and occipital lobes on Brain MRI dated 9/13. Suspected to be cardioembolic (A-Fib and also PFO on TTE) so the patient was started on ASA/Lipotor. Started on Eliquis on 9/19 but developed hemoptysis (1/2 cup) on 9/20. Continued to have hemoptysis so he underwent bronchoscopy on 9/21 which demonstrated DAVID bleeding. No growth on bronchoscopy cultures. CT Chest repeated on 9/22 revealed intracavitary mycetoma which prompted ID evaluation for possible antifungal/antibacterial coverage. Tentatively planned for DAVID Lobectomy this evening. Last fever was on 9/12/2017.     PAST MEDICAL & SURGICAL HISTORY:  History of pneumothorax: History of 3 prior pneumonthoracies.  COPD (chronic obstructive pulmonary disease)  Asthma  Hypertension  Sarcoidosis  No significant past surgical history    Allergies  No Known Allergies    ANTIMICROBIALS:      OTHER MEDS: MEDICATIONS  (STANDING):  atorvastatin 80 at bedtime  acetaminophen    Suspension 650 every 6 hours PRN  ALBUTerol/ipratropium for Nebulization 3 every 6 hours PRN  aspirin  chewable 81 daily  diVALproex  two times a day  buDESOnide  80 MICROgram(s)/formoterol 4.5 MICROgram(s) Inhaler 2 two times a day  polyethylene glycol 3350 17 at bedtime  aluminum hydroxide/magnesium hydroxide/simethicone Suspension 30 every 6 hours PRN  pantoprazole    Tablet 40 before breakfast  ALBUTerol/ipratropium for Nebulization 3 every 6 hours  predniSONE   Tablet 40 daily    SOCIAL HISTORY:  [ ] etoh [ ] tobacco [ ] former smoker [ ] IVDU    FAMILY HISTORY:  Family history of pancreatic cancer (Father)  Family history of essential hypertension (Father)      REVIEW OF SYSTEMS  [  ] ROS unobtainable because:    [  ] All other systems negative except as noted below:	    Constitutional:  [ ] fever [ ] weight loss  Skin:  [ ] rash [ ] phlebitis	  Eyes: [ ] icterus [ ] inflammation	  ENMT: [ ] discharge [ ] thrush [ ] ulcers [ ] exudates  Respiratory: [ ] dyspnea [ ] hemoptysis [ ] cough [ ] sputum	  Cardiovascular:  [ ] chest pain [ ] palpitations [ ] edema	  Gastrointestinal:  [ ] nausea [ ] vomiting [ ] diarrhea [ ] constipation [ ] pain	  Genitourinary:  [ ] dysuria [ ] frequency [ ] hematuria [ ] discharge [ ] flank pain  Musculoskeletal:  [ ] myalgias [ ] arthralgias [ ] arthritis	  Neurological:  [ ] headache [ ] seizures	  Psychiatric:  [ ] anxiety [ ] depression	  Hematology/Lymphatics:  [ ] lymphadenopathy  Endocrine:  [ ] adrenal [ ] thyroid  Allergic/Immunologic:	 [ ] transplant [ ] seasonal    Vital Signs Last 24 Hrs  T(F): 98.6 (09-23-17 @ 14:41), Max: 98.7 (09-17-17 @ 06:15)    Vital Signs Last 24 Hrs  HR: 72 (09-23-17 @ 14:41) (72 - 92)  BP: 113/73 (09-23-17 @ 14:41) (100/61 - 134/94)  RR: 16 (09-23-17 @ 14:41)  SpO2: 98% (09-23-17 @ 14:41) (96% - 100%)  Wt(kg): --    PHYSICAL EXAM:  General: non-toxic  HEAD/EYES: anicteric, PERRL  ENT:  supple  Cardiovascular:   S1, S2  Respiratory:  clear bilaterally  GI:  soft, non-tender, normal bowel sounds  :  no CVA tenderness   Musculoskeletal:  no synovitis  Neurologic:  grossly non-focal  Skin:  no rash  Lymph: no lymphadenopathy  Psychiatric:  appropriate affect  Vascular:  no phlebitis                                12.8   9.24  )-----------( 129      ( 23 Sep 2017 06:40 )             39.1       09-23    140  |  98  |  21  ----------------------------<  86  4.1   |  30  |  1.06    Ca    9.1      23 Sep 2017 06:40   Mg     1.9     09-23            MICROBIOLOGY:    Culture - Respiratory with Gram Stain (09.21.17 @ 18:21)    Gram Stain Sputum:   WBC^White Blood Cells  QUANTITY OF BACTERIA SEEN: MODERATE (3+)  NOS^No Organisms Seen    Culture - Respiratory:   NO GROWTH - PRELIMINARY RESULTS  NRF^Normal Respiratory Katherine  QUANTITY OF GROWTH: FEW    Specimen Source: BRONCHIAL LAVAGE    Culture - Acid Fast Smear Concentrated (09.21.17 @ 18:21)    Specimen Source: BRONCHIAL LAVAGE    Culture - Acid Fast Smear Concentrated:   AFB SMEAR= NO ACID FAST BACILLI SEEN    Culture - Blood (09.12.17 @ 22:47)    Culture - Blood:   NO GROWTH AFTER 5 DAYS INCUBATION    Specimen Source: BLOOD PERIPHERAL    RADIOLOGY:    EXAM:  CT ANGIO CHEST (W)AW IC    PROCEDURE DATE:  Sep 22 2017   Marked architectural distortion with cystic changes and traction   bronchiectasis in both upper lobes secondary to end-stage sarcoidosis.  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   Prominent intercostal arteries especially on the left side especially on   the left side with no evidence of active bleed.    EXAM:  MRI BRAIN W-W O CONTRAST    PROCEDURE DATE:  Sep 19 2017   Abnormal T2 prolongation with increased signal on the   diffusion-weighted sequence is again identified in the supratentorial   region. These areas now demonstrate increased gyriform type of   enhancement. These could be compatible areas of subacute infarcts as well   as Press though can also be related to patient's known sarcoidosis.   Clinical correlation and continued close interval follow-up is   recommended.    EXAM:  CT ANGIO NECK (W)AW IC    EXAM:  CT ANGIO BRAIN (W)AW IC    PROCEDURE DATE:  Sep 10 2017   CT head: Motion degraded exam. No acute intracranial hemorrhage, mass   effect, or CT evidence of an acute vascular territorial infarct. No   abnormal intracranial enhancement.    CTA head: No significant stenosis or occlusion of the major proximal   arterial branches. No evidence of an intracranial arterial aneurysm.    CTA NECK: No significant stenosis of the cervical carotid arteries based   on NASCET criteria. No significant stenosis of the cervical vertebral   arteries. Severe scarring and bronchiectasis in the lung apices and   mildly prominent right paratracheal lymph nodes.    EXAM:  CT CHEST    PROCEDURE DATE:  Sep 11 2017   Pulmonary nodular or patchy opacities throughout both lungs   with associated volume loss and architectural distortion as described   above related to sarcoidosis.    Enlargement of the main pulmonary artery can be seen in the setting of   pulmonary arterial hypertension. HPI:    48 yo M with PMH Sarcoidosis, Asthma, COPD, Pneumothorax x3 who was seen in the ED on 9/10 for presyncopal episode and cough associated with blood tinged sputum. Received Zosyn from 9/12-9/17 and Azithromycin from 9/11-9/13. RVP resulted as positive for Rhinovirus. On 9/12 he became unresponsive and had eyes deviated to the right, urinary incontinence and tongue trauma and thus was felt to have seizure but with evidence of acute infarction involving the right greater than left frontal, parietal, and occipital lobes on Brain MRI dated 9/13. Suspected to be cardioembolic (A-Fib and also PFO on TTE) so the patient was started on ASA/Lipotor. Started on Eliquis on 9/19 but developed hemoptysis (1/2 cup) on 9/20. Continued to have hemoptysis so he underwent bronchoscopy on 9/21 which demonstrated DAVID bleeding. No growth on bronchoscopy cultures. CT Chest repeated on 9/22 revealed intracavitary mycetoma which prompted ID evaluation for possible antifungal/antibacterial coverage. Tentatively planned for DAVID Lobectomy this evening. Last fever was on 9/12/2017.     PAST MEDICAL & SURGICAL HISTORY:  History of pneumothorax: History of 3 prior pneumonthoracies.  COPD (chronic obstructive pulmonary disease)  Asthma  Hypertension  Sarcoidosis  No significant past surgical history    Allergies  No Known Allergies    ANTIMICROBIALS:      OTHER MEDS: MEDICATIONS  (STANDING):  atorvastatin 80 at bedtime  acetaminophen    Suspension 650 every 6 hours PRN  ALBUTerol/ipratropium for Nebulization 3 every 6 hours PRN  aspirin  chewable 81 daily  diVALproex  two times a day  buDESOnide  80 MICROgram(s)/formoterol 4.5 MICROgram(s) Inhaler 2 two times a day  polyethylene glycol 3350 17 at bedtime  aluminum hydroxide/magnesium hydroxide/simethicone Suspension 30 every 6 hours PRN  pantoprazole    Tablet 40 before breakfast  ALBUTerol/ipratropium for Nebulization 3 every 6 hours  predniSONE   Tablet 40 daily    SOCIAL HISTORY:  No tobacco use. No EtOH use. No recreational drug use. Tested for TB in the past     FAMILY HISTORY:  Family history of pancreatic cancer (Father)  Family history of essential hypertension (Father)      REVIEW OF SYSTEMS  [  ] ROS unobtainable because:    [x  ] All other systems negative except as noted below:	    Constitutional:  [ ] fever [ ] weight loss  Skin:  [ ] rash [ ] phlebitis	  Eyes: [ ] icterus [ ] inflammation	  ENMT: [ ] discharge [ ] thrush [ ] ulcers [ ] exudates  Respiratory: [ x] dyspnea [ x] hemoptysis [x ] cough [ ] sputum	  Cardiovascular:  [ ] chest pain [ ] palpitations [ ] edema	  Gastrointestinal:  [ ] nausea [ ] vomiting [ ] diarrhea [ ] constipation [ ] pain	  Genitourinary:  [ ] dysuria [ ] frequency [ ] hematuria [ ] discharge [ ] flank pain  Musculoskeletal:  [ ] myalgias [ ] arthralgias [ ] arthritis	  Neurological:  [ ] headache [ ] seizures	  Psychiatric:  [ ] anxiety [ ] depression	  Hematology/Lymphatics:  [ ] lymphadenopathy  Endocrine:  [ ] adrenal [ ] thyroid  Allergic/Immunologic:	 [ ] transplant [ ] seasonal    Vital Signs Last 24 Hrs  T(F): 98.6 (09-23-17 @ 14:41), Max: 98.7 (09-17-17 @ 06:15)    Vital Signs Last 24 Hrs  HR: 72 (09-23-17 @ 14:41) (72 - 92)  BP: 113/73 (09-23-17 @ 14:41) (100/61 - 134/94)  RR: 16 (09-23-17 @ 14:41)  SpO2: 98% (09-23-17 @ 14:41) (96% - 100%)  Wt(kg): --    PHYSICAL EXAMINATION:  General: AAO3X, NAD  HEENT: PERRL, EOMI  Neck: Supple  Cardiac: RRR, No M/R/G  Resp: End expiratory wheezes bilaterally, rales at lung bases.   Abdomen: NBS, NT/ND, No HSM, No rigidity or guarding  MSK: No LE edema. No calf tenderness  Skin: No rashes or lesions. Skin is warm and dry to the touch.   Neuro: AAO3X. CN 2-12 Grossly intact. Moves all four extremities spontaneously.  Psych: Calm, Pleasant, Cooperative                            12.8   9.24  )-----------( 129      ( 23 Sep 2017 06:40 )             39.1       09-23    140  |  98  |  21  ----------------------------<  86  4.1   |  30  |  1.06    Ca    9.1      23 Sep 2017 06:40   Mg     1.9     09-23            MICROBIOLOGY:    Culture - Respiratory with Gram Stain (09.21.17 @ 18:21)    Gram Stain Sputum:   WBC^White Blood Cells  QUANTITY OF BACTERIA SEEN: MODERATE (3+)  NOS^No Organisms Seen    Culture - Respiratory:   NO GROWTH - PRELIMINARY RESULTS  NRF^Normal Respiratory Katherine  QUANTITY OF GROWTH: FEW    Specimen Source: BRONCHIAL LAVAGE    Culture - Acid Fast Smear Concentrated (09.21.17 @ 18:21)    Specimen Source: BRONCHIAL LAVAGE    Culture - Acid Fast Smear Concentrated:   AFB SMEAR= NO ACID FAST BACILLI SEEN    Culture - Blood (09.12.17 @ 22:47)    Culture - Blood:   NO GROWTH AFTER 5 DAYS INCUBATION    Specimen Source: BLOOD PERIPHERAL    RADIOLOGY:    EXAM:  CT ANGIO CHEST (W)AW IC    PROCEDURE DATE:  Sep 22 2017   Marked architectural distortion with cystic changes and traction   bronchiectasis in both upper lobes secondary to end-stage sarcoidosis.  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   Prominent intercostal arteries especially on the left side especially on   the left side with no evidence of active bleed.    EXAM:  MRI BRAIN W-W O CONTRAST    PROCEDURE DATE:  Sep 19 2017   Abnormal T2 prolongation with increased signal on the   diffusion-weighted sequence is again identified in the supratentorial   region. These areas now demonstrate increased gyriform type of   enhancement. These could be compatible areas of subacute infarcts as well   as Press though can also be related to patient's known sarcoidosis.   Clinical correlation and continued close interval follow-up is   recommended.    EXAM:  CT ANGIO NECK (W)AW IC    EXAM:  CT ANGIO BRAIN (W)AW IC    PROCEDURE DATE:  Sep 10 2017   CT head: Motion degraded exam. No acute intracranial hemorrhage, mass   effect, or CT evidence of an acute vascular territorial infarct. No   abnormal intracranial enhancement.    CTA head: No significant stenosis or occlusion of the major proximal   arterial branches. No evidence of an intracranial arterial aneurysm.    CTA NECK: No significant stenosis of the cervical carotid arteries based   on NASCET criteria. No significant stenosis of the cervical vertebral   arteries. Severe scarring and bronchiectasis in the lung apices and   mildly prominent right paratracheal lymph nodes.    EXAM:  CT CHEST    PROCEDURE DATE:  Sep 11 2017   Pulmonary nodular or patchy opacities throughout both lungs   with associated volume loss and architectural distortion as described   above related to sarcoidosis.    Enlargement of the main pulmonary artery can be seen in the setting of   pulmonary arterial hypertension.

## 2017-09-24 ENCOUNTER — RESULT REVIEW (OUTPATIENT)
Age: 47
End: 2017-09-24

## 2017-09-24 ENCOUNTER — APPOINTMENT (OUTPATIENT)
Dept: THORACIC SURGERY | Facility: HOSPITAL | Age: 47
End: 2017-09-24
Payer: MEDICAID

## 2017-09-24 LAB
ALBUMIN SERPL ELPH-MCNC: 2.9 G/DL — LOW (ref 3.3–5)
ALP SERPL-CCNC: 36 U/L — LOW (ref 40–120)
ALT FLD-CCNC: 23 U/L — SIGNIFICANT CHANGE UP (ref 4–41)
APTT BLD: 20 SEC — LOW (ref 27.5–37.4)
AST SERPL-CCNC: 35 U/L — SIGNIFICANT CHANGE UP (ref 4–40)
BACTERIA SPT RESP CULT: SIGNIFICANT CHANGE UP
BASE EXCESS BLDA CALC-SCNC: 2.2 MMOL/L — SIGNIFICANT CHANGE UP
BASE EXCESS BLDA CALC-SCNC: 2.5 MMOL/L — SIGNIFICANT CHANGE UP
BASE EXCESS BLDA CALC-SCNC: 3 MMOL/L — SIGNIFICANT CHANGE UP
BASE EXCESS BLDA CALC-SCNC: 3.3 MMOL/L — SIGNIFICANT CHANGE UP
BASE EXCESS BLDA CALC-SCNC: 4.9 MMOL/L — SIGNIFICANT CHANGE UP
BASE EXCESS BLDA CALC-SCNC: 8.3 MMOL/L — SIGNIFICANT CHANGE UP
BILIRUB SERPL-MCNC: 1.3 MG/DL — HIGH (ref 0.2–1.2)
BLD GP AB SCN SERPL QL: NEGATIVE — SIGNIFICANT CHANGE UP
BUN SERPL-MCNC: 21 MG/DL — SIGNIFICANT CHANGE UP (ref 7–23)
BUN SERPL-MCNC: 22 MG/DL — SIGNIFICANT CHANGE UP (ref 7–23)
CA-I BLDA-SCNC: 1.15 MMOL/L — SIGNIFICANT CHANGE UP (ref 1.15–1.29)
CA-I BLDA-SCNC: 1.19 MMOL/L — SIGNIFICANT CHANGE UP (ref 1.15–1.29)
CA-I BLDA-SCNC: 1.22 MMOL/L — SIGNIFICANT CHANGE UP (ref 1.15–1.29)
CA-I BLDA-SCNC: 1.24 MMOL/L — SIGNIFICANT CHANGE UP (ref 1.15–1.29)
CALCIUM SERPL-MCNC: 8 MG/DL — LOW (ref 8.4–10.5)
CALCIUM SERPL-MCNC: 9.3 MG/DL — SIGNIFICANT CHANGE UP (ref 8.4–10.5)
CHLORIDE SERPL-SCNC: 101 MMOL/L — SIGNIFICANT CHANGE UP (ref 98–107)
CHLORIDE SERPL-SCNC: 102 MMOL/L — SIGNIFICANT CHANGE UP (ref 98–107)
CO2 SERPL-SCNC: 27 MMOL/L — SIGNIFICANT CHANGE UP (ref 22–31)
CO2 SERPL-SCNC: 31 MMOL/L — SIGNIFICANT CHANGE UP (ref 22–31)
COHGB MFR BLDA: 1.4 % — SIGNIFICANT CHANGE UP (ref 0.5–1.5)
CREAT SERPL-MCNC: 0.98 MG/DL — SIGNIFICANT CHANGE UP (ref 0.5–1.3)
CREAT SERPL-MCNC: 1.15 MG/DL — SIGNIFICANT CHANGE UP (ref 0.5–1.3)
GLUCOSE BLDA-MCNC: 102 MG/DL — HIGH (ref 70–99)
GLUCOSE BLDA-MCNC: 107 MG/DL — HIGH (ref 70–99)
GLUCOSE BLDA-MCNC: 108 MG/DL — HIGH (ref 70–99)
GLUCOSE BLDA-MCNC: 109 MG/DL — HIGH (ref 70–99)
GLUCOSE BLDA-MCNC: 112 MG/DL — HIGH (ref 70–99)
GLUCOSE BLDA-MCNC: 117 MG/DL — HIGH (ref 70–99)
GLUCOSE SERPL-MCNC: 105 MG/DL — HIGH (ref 70–99)
GLUCOSE SERPL-MCNC: 82 MG/DL — SIGNIFICANT CHANGE UP (ref 70–99)
GRAM STN SPT: SIGNIFICANT CHANGE UP
HCO3 BLDA-SCNC: 26 MMOL/L — SIGNIFICANT CHANGE UP (ref 22–26)
HCO3 BLDA-SCNC: 27 MMOL/L — HIGH (ref 22–26)
HCO3 BLDA-SCNC: 27 MMOL/L — HIGH (ref 22–26)
HCO3 BLDA-SCNC: 30 MMOL/L — HIGH (ref 22–26)
HCT VFR BLD CALC: 33.3 % — LOW (ref 39–50)
HCT VFR BLD CALC: 38.3 % — LOW (ref 39–50)
HCT VFR BLD CALC: 40.3 % — SIGNIFICANT CHANGE UP (ref 39–50)
HCT VFR BLDA CALC: 35 % — LOW (ref 39–51)
HCT VFR BLDA CALC: 37 % — LOW (ref 39–51)
HCT VFR BLDA CALC: 37.4 % — LOW (ref 39–51)
HCT VFR BLDA CALC: 38.1 % — LOW (ref 39–51)
HCT VFR BLDA CALC: 38.4 % — LOW (ref 39–51)
HCT VFR BLDA CALC: 39.1 % — SIGNIFICANT CHANGE UP (ref 39–51)
HGB BLD-MCNC: 10.7 G/DL — LOW (ref 13–17)
HGB BLD-MCNC: 12.4 G/DL — LOW (ref 13–17)
HGB BLD-MCNC: 13 G/DL — SIGNIFICANT CHANGE UP (ref 13–17)
HGB BLDA-MCNC: 11.4 G/DL — LOW (ref 13–17)
HGB BLDA-MCNC: 12 G/DL — LOW (ref 13–17)
HGB BLDA-MCNC: 12.2 G/DL — LOW (ref 13–17)
HGB BLDA-MCNC: 12.2 G/DL — LOW (ref 13–17)
HGB BLDA-MCNC: 12.4 G/DL — LOW (ref 13–17)
HGB BLDA-MCNC: 12.5 G/DL — LOW (ref 13–17)
HGB BLDA-MCNC: 12.7 G/DL — LOW (ref 13–17)
INR BLD: 1.19 — HIGH (ref 0.88–1.17)
LACTATE SERPL-SCNC: 1.9 MMOL/L — SIGNIFICANT CHANGE UP (ref 0.5–2)
MAGNESIUM SERPL-MCNC: 1.8 MG/DL — SIGNIFICANT CHANGE UP (ref 1.6–2.6)
MAGNESIUM SERPL-MCNC: 1.9 MG/DL — SIGNIFICANT CHANGE UP (ref 1.6–2.6)
MCHC RBC-ENTMCNC: 28.4 PG — SIGNIFICANT CHANGE UP (ref 27–34)
MCHC RBC-ENTMCNC: 29.1 PG — SIGNIFICANT CHANGE UP (ref 27–34)
MCHC RBC-ENTMCNC: 29.3 PG — SIGNIFICANT CHANGE UP (ref 27–34)
MCHC RBC-ENTMCNC: 32.1 % — SIGNIFICANT CHANGE UP (ref 32–36)
MCHC RBC-ENTMCNC: 32.3 % — SIGNIFICANT CHANGE UP (ref 32–36)
MCHC RBC-ENTMCNC: 32.4 % — SIGNIFICANT CHANGE UP (ref 32–36)
MCV RBC AUTO: 87.6 FL — SIGNIFICANT CHANGE UP (ref 80–100)
MCV RBC AUTO: 90.5 FL — SIGNIFICANT CHANGE UP (ref 80–100)
MCV RBC AUTO: 91 FL — SIGNIFICANT CHANGE UP (ref 80–100)
METHGB MFR BLDA: 1.1 % — SIGNIFICANT CHANGE UP (ref 0–1.5)
NRBC # FLD: 0 — SIGNIFICANT CHANGE UP
OXYHGB MFR BLDA: 92.7 % — LOW (ref 94–98)
PCO2 BLDA: 49 MMHG — HIGH (ref 35–48)
PCO2 BLDA: 50 MMHG — HIGH (ref 35–48)
PCO2 BLDA: 59 MMHG — HIGH (ref 35–48)
PCO2 BLDA: 67 MMHG — HIGH (ref 35–48)
PCO2 BLDA: 70 MMHG — CRITICAL HIGH (ref 35–48)
PCO2 BLDA: 78 MMHG — CRITICAL HIGH (ref 35–48)
PH BLDA: 7.24 PH — LOW (ref 7.35–7.45)
PH BLDA: 7.27 PH — LOW (ref 7.35–7.45)
PH BLDA: 7.3 PH — LOW (ref 7.35–7.45)
PH BLDA: 7.31 PH — LOW (ref 7.35–7.45)
PH BLDA: 7.36 PH — SIGNIFICANT CHANGE UP (ref 7.35–7.45)
PH BLDA: 7.36 PH — SIGNIFICANT CHANGE UP (ref 7.35–7.45)
PHOSPHATE SERPL-MCNC: 3.6 MG/DL — SIGNIFICANT CHANGE UP (ref 2.5–4.5)
PLATELET # BLD AUTO: 122 K/UL — LOW (ref 150–400)
PLATELET # BLD AUTO: 131 K/UL — LOW (ref 150–400)
PLATELET # BLD AUTO: 138 K/UL — LOW (ref 150–400)
PMV BLD: 13.6 FL — HIGH (ref 7–13)
PMV BLD: 13.7 FL — HIGH (ref 7–13)
PMV BLD: 13.7 FL — HIGH (ref 7–13)
PO2 BLDA: 155 MMHG — HIGH (ref 83–108)
PO2 BLDA: 178 MMHG — HIGH (ref 83–108)
PO2 BLDA: 186 MMHG — HIGH (ref 83–108)
PO2 BLDA: 266 MMHG — HIGH (ref 83–108)
PO2 BLDA: 437 MMHG — HIGH (ref 83–108)
PO2 BLDA: 84 MMHG — SIGNIFICANT CHANGE UP (ref 83–108)
POTASSIUM BLDA-SCNC: 3.7 MMOL/L — SIGNIFICANT CHANGE UP (ref 3.4–4.5)
POTASSIUM BLDA-SCNC: 3.7 MMOL/L — SIGNIFICANT CHANGE UP (ref 3.4–4.5)
POTASSIUM BLDA-SCNC: 3.8 MMOL/L — SIGNIFICANT CHANGE UP (ref 3.4–4.5)
POTASSIUM BLDA-SCNC: 4.2 MMOL/L — SIGNIFICANT CHANGE UP (ref 3.4–4.5)
POTASSIUM BLDA-SCNC: 4.3 MMOL/L — SIGNIFICANT CHANGE UP (ref 3.4–4.5)
POTASSIUM BLDA-SCNC: 4.4 MMOL/L — SIGNIFICANT CHANGE UP (ref 3.4–4.5)
POTASSIUM SERPL-MCNC: 4.2 MMOL/L — SIGNIFICANT CHANGE UP (ref 3.5–5.3)
POTASSIUM SERPL-MCNC: 4.6 MMOL/L — SIGNIFICANT CHANGE UP (ref 3.5–5.3)
POTASSIUM SERPL-SCNC: 4.2 MMOL/L — SIGNIFICANT CHANGE UP (ref 3.5–5.3)
POTASSIUM SERPL-SCNC: 4.6 MMOL/L — SIGNIFICANT CHANGE UP (ref 3.5–5.3)
PROT SERPL-MCNC: 5.6 G/DL — LOW (ref 6–8.3)
PROTHROM AB SERPL-ACNC: 13.4 SEC — HIGH (ref 9.8–13.1)
RBC # BLD: 3.68 M/UL — LOW (ref 4.2–5.8)
RBC # BLD: 4.37 M/UL — SIGNIFICANT CHANGE UP (ref 4.2–5.8)
RBC # BLD: 4.43 M/UL — SIGNIFICANT CHANGE UP (ref 4.2–5.8)
RBC # FLD: 12.4 % — SIGNIFICANT CHANGE UP (ref 10.3–14.5)
RBC # FLD: 12.5 % — SIGNIFICANT CHANGE UP (ref 10.3–14.5)
RBC # FLD: 13.2 % — SIGNIFICANT CHANGE UP (ref 10.3–14.5)
RH IG SCN BLD-IMP: POSITIVE — SIGNIFICANT CHANGE UP
SAO2 % BLDA: 95 % — SIGNIFICANT CHANGE UP (ref 95–99)
SAO2 % BLDA: 99.1 % — HIGH (ref 95–99)
SAO2 % BLDA: 99.2 % — HIGH (ref 95–99)
SAO2 % BLDA: 99.5 % — HIGH (ref 95–99)
SAO2 % BLDA: 99.7 % — HIGH (ref 95–99)
SAO2 % BLDA: 99.9 % — HIGH (ref 95–99)
SODIUM BLDA-SCNC: 135 MMOL/L — LOW (ref 136–146)
SODIUM BLDA-SCNC: 136 MMOL/L — SIGNIFICANT CHANGE UP (ref 136–146)
SODIUM BLDA-SCNC: 136 MMOL/L — SIGNIFICANT CHANGE UP (ref 136–146)
SODIUM BLDA-SCNC: 138 MMOL/L — SIGNIFICANT CHANGE UP (ref 136–146)
SODIUM BLDA-SCNC: 138 MMOL/L — SIGNIFICANT CHANGE UP (ref 136–146)
SODIUM BLDA-SCNC: 140 MMOL/L — SIGNIFICANT CHANGE UP (ref 136–146)
SODIUM SERPL-SCNC: 140 MMOL/L — SIGNIFICANT CHANGE UP (ref 135–145)
SODIUM SERPL-SCNC: 144 MMOL/L — SIGNIFICANT CHANGE UP (ref 135–145)
SPECIMEN SOURCE: SIGNIFICANT CHANGE UP
WBC # BLD: 21.96 K/UL — HIGH (ref 3.8–10.5)
WBC # BLD: 27.56 K/UL — HIGH (ref 3.8–10.5)
WBC # BLD: 6.78 K/UL — SIGNIFICANT CHANGE UP (ref 3.8–10.5)
WBC # FLD AUTO: 21.96 K/UL — HIGH (ref 3.8–10.5)
WBC # FLD AUTO: 27.56 K/UL — HIGH (ref 3.8–10.5)
WBC # FLD AUTO: 6.78 K/UL — SIGNIFICANT CHANGE UP (ref 3.8–10.5)

## 2017-09-24 PROCEDURE — 32652 THORACOSCOPY REM TOTL CORTEX: CPT

## 2017-09-24 PROCEDURE — 71010: CPT | Mod: 26

## 2017-09-24 PROCEDURE — 88309 TISSUE EXAM BY PATHOLOGIST: CPT | Mod: 26

## 2017-09-24 PROCEDURE — 32480 PARTIAL REMOVAL OF LUNG: CPT

## 2017-09-24 PROCEDURE — 88300 SURGICAL PATH GROSS: CPT | Mod: 26,59

## 2017-09-24 PROCEDURE — 99291 CRITICAL CARE FIRST HOUR: CPT

## 2017-09-24 RX ORDER — IPRATROPIUM BROMIDE 0.2 MG/ML
1 SOLUTION, NON-ORAL INHALATION EVERY 6 HOURS
Qty: 0 | Refills: 0 | Status: DISCONTINUED | OUTPATIENT
Start: 2017-09-24 | End: 2017-09-25

## 2017-09-24 RX ORDER — VORICONAZOLE 10 MG/ML
400 INJECTION, POWDER, LYOPHILIZED, FOR SOLUTION INTRAVENOUS EVERY 12 HOURS
Qty: 0 | Refills: 0 | Status: DISCONTINUED | OUTPATIENT
Start: 2017-09-24 | End: 2017-09-24

## 2017-09-24 RX ORDER — ONDANSETRON 8 MG/1
4 TABLET, FILM COATED ORAL EVERY 6 HOURS
Qty: 0 | Refills: 0 | Status: DISCONTINUED | OUTPATIENT
Start: 2017-09-24 | End: 2017-09-26

## 2017-09-24 RX ORDER — PHENYLEPHRINE HYDROCHLORIDE 10 MG/ML
1.3 INJECTION INTRAVENOUS
Qty: 80 | Refills: 0 | Status: DISCONTINUED | OUTPATIENT
Start: 2017-09-24 | End: 2017-09-25

## 2017-09-24 RX ORDER — MAGNESIUM SULFATE 500 MG/ML
2 VIAL (ML) INJECTION ONCE
Qty: 0 | Refills: 0 | Status: COMPLETED | OUTPATIENT
Start: 2017-09-24 | End: 2017-09-24

## 2017-09-24 RX ORDER — ALBUTEROL 90 UG/1
2 AEROSOL, METERED ORAL EVERY 6 HOURS
Qty: 0 | Refills: 0 | Status: DISCONTINUED | OUTPATIENT
Start: 2017-09-24 | End: 2017-09-25

## 2017-09-24 RX ORDER — NALOXONE HYDROCHLORIDE 4 MG/.1ML
0.1 SPRAY NASAL
Qty: 0 | Refills: 0 | Status: DISCONTINUED | OUTPATIENT
Start: 2017-09-24 | End: 2017-09-26

## 2017-09-24 RX ORDER — SODIUM CHLORIDE 9 MG/ML
250 INJECTION INTRAMUSCULAR; INTRAVENOUS; SUBCUTANEOUS ONCE
Qty: 0 | Refills: 0 | Status: COMPLETED | OUTPATIENT
Start: 2017-09-24 | End: 2017-09-24

## 2017-09-24 RX ORDER — FENTANYL CITRATE 50 UG/ML
25 INJECTION INTRAVENOUS ONCE
Qty: 0 | Refills: 0 | Status: DISCONTINUED | OUTPATIENT
Start: 2017-09-24 | End: 2017-09-24

## 2017-09-24 RX ORDER — HYDROMORPHONE HYDROCHLORIDE 2 MG/ML
0.5 INJECTION INTRAMUSCULAR; INTRAVENOUS; SUBCUTANEOUS
Qty: 0 | Refills: 0 | Status: DISCONTINUED | OUTPATIENT
Start: 2017-09-24 | End: 2017-09-26

## 2017-09-24 RX ORDER — PROPOFOL 10 MG/ML
10 INJECTION, EMULSION INTRAVENOUS
Qty: 1000 | Refills: 0 | Status: DISCONTINUED | OUTPATIENT
Start: 2017-09-24 | End: 2017-09-25

## 2017-09-24 RX ORDER — HEPARIN SODIUM 5000 [USP'U]/ML
5000 INJECTION INTRAVENOUS; SUBCUTANEOUS EVERY 8 HOURS
Qty: 0 | Refills: 0 | Status: DISCONTINUED | OUTPATIENT
Start: 2017-09-24 | End: 2017-09-26

## 2017-09-24 RX ORDER — VORICONAZOLE 10 MG/ML
200 INJECTION, POWDER, LYOPHILIZED, FOR SOLUTION INTRAVENOUS EVERY 12 HOURS
Qty: 0 | Refills: 0 | Status: DISCONTINUED | OUTPATIENT
Start: 2017-09-25 | End: 2017-10-07

## 2017-09-24 RX ORDER — VORICONAZOLE 10 MG/ML
400 INJECTION, POWDER, LYOPHILIZED, FOR SOLUTION INTRAVENOUS EVERY 12 HOURS
Qty: 0 | Refills: 0 | Status: COMPLETED | OUTPATIENT
Start: 2017-09-24 | End: 2017-09-25

## 2017-09-24 RX ORDER — HYDROMORPHONE HYDROCHLORIDE 2 MG/ML
30 INJECTION INTRAMUSCULAR; INTRAVENOUS; SUBCUTANEOUS
Qty: 0 | Refills: 0 | Status: DISCONTINUED | OUTPATIENT
Start: 2017-09-24 | End: 2017-09-26

## 2017-09-24 RX ORDER — CALCIUM GLUCONATE 100 MG/ML
1 VIAL (ML) INTRAVENOUS ONCE
Qty: 0 | Refills: 0 | Status: COMPLETED | OUTPATIENT
Start: 2017-09-24 | End: 2017-09-24

## 2017-09-24 RX ORDER — SODIUM CHLORIDE 9 MG/ML
1000 INJECTION, SOLUTION INTRAVENOUS
Qty: 0 | Refills: 0 | Status: DISCONTINUED | OUTPATIENT
Start: 2017-09-24 | End: 2017-09-25

## 2017-09-24 RX ADMIN — HEPARIN SODIUM 5000 UNIT(S): 5000 INJECTION INTRAVENOUS; SUBCUTANEOUS at 22:11

## 2017-09-24 RX ADMIN — SODIUM CHLORIDE 50 MILLILITER(S): 9 INJECTION, SOLUTION INTRAVENOUS at 22:12

## 2017-09-24 RX ADMIN — FENTANYL CITRATE 25 MICROGRAM(S): 50 INJECTION INTRAVENOUS at 16:15

## 2017-09-24 RX ADMIN — PIPERACILLIN AND TAZOBACTAM 25 GRAM(S): 4; .5 INJECTION, POWDER, LYOPHILIZED, FOR SOLUTION INTRAVENOUS at 06:19

## 2017-09-24 RX ADMIN — SODIUM CHLORIDE 500 MILLILITER(S): 9 INJECTION INTRAMUSCULAR; INTRAVENOUS; SUBCUTANEOUS at 17:00

## 2017-09-24 RX ADMIN — VORICONAZOLE 125 MILLIGRAM(S): 10 INJECTION, POWDER, LYOPHILIZED, FOR SOLUTION INTRAVENOUS at 22:22

## 2017-09-24 RX ADMIN — FENTANYL CITRATE 25 MICROGRAM(S): 50 INJECTION INTRAVENOUS at 16:00

## 2017-09-24 RX ADMIN — PHENYLEPHRINE HYDROCHLORIDE 47.29 MICROGRAM(S)/KG/MIN: 10 INJECTION INTRAVENOUS at 22:12

## 2017-09-24 RX ADMIN — SODIUM CHLORIDE 30 MILLILITER(S): 9 INJECTION, SOLUTION INTRAVENOUS at 15:00

## 2017-09-24 RX ADMIN — PIPERACILLIN AND TAZOBACTAM 25 GRAM(S): 4; .5 INJECTION, POWDER, LYOPHILIZED, FOR SOLUTION INTRAVENOUS at 22:11

## 2017-09-24 RX ADMIN — Medication 40 MILLIGRAM(S): at 22:11

## 2017-09-24 RX ADMIN — Medication 200 GRAM(S): at 18:15

## 2017-09-24 RX ADMIN — Medication 50 GRAM(S): at 06:46

## 2017-09-24 RX ADMIN — Medication 1 PUFF(S): at 22:13

## 2017-09-24 RX ADMIN — ALBUTEROL 2 PUFF(S): 90 AEROSOL, METERED ORAL at 22:05

## 2017-09-24 RX ADMIN — PROPOFOL 5.82 MICROGRAM(S)/KG/MIN: 10 INJECTION, EMULSION INTRAVENOUS at 22:12

## 2017-09-24 NOTE — BRIEF OPERATIVE NOTE - PROCEDURE
<<-----Click on this checkbox to enter Procedure Thoracotomy, with lung lobectomy  09/24/2017  Left Thoracotomy, Pneumonolysis, DAVID lobectomy  Active  DEION  Thoracoscopic pneumolysis  09/24/2017    Active  DEION

## 2017-09-24 NOTE — PROGRESS NOTE ADULT - SUBJECTIVE AND OBJECTIVE BOX
Patient is a 47y old  Male who presents with a chief complaint of "My leg wouldn't stop shaking." (14 Sep 2017 09:59)    s/p surgery  currently intubated  and sedated       Any change in ROS:     MEDICATIONS  (STANDING):  atorvastatin 80 milliGRAM(s) Oral at bedtime  chlorhexidine 4% Liquid 1 Application(s) Topical daily  aspirin  chewable 81 milliGRAM(s) Oral daily  diVALproex  milliGRAM(s) Oral two times a day  buDESOnide  80 MICROgram(s)/formoterol 4.5 MICROgram(s) Inhaler 2 Puff(s) Inhalation two times a day  polyethylene glycol 3350 17 Gram(s) Oral at bedtime  pantoprazole    Tablet 40 milliGRAM(s) Oral before breakfast  ALBUTerol/ipratropium for Nebulization 3 milliLiter(s) Nebulizer every 6 hours  predniSONE   Tablet 40 milliGRAM(s) Oral daily  lactated ringers. 1000 milliLiter(s) (30 mL/Hr) IV Continuous <Continuous>  voriconazole 400 milliGRAM(s) Oral every 12 hours  voriconazole 200 milliGRAM(s) Oral every 12 hours  piperacillin/tazobactam IVPB. 3.375 Gram(s) IV Intermittent every 8 hours  HYDROmorphone PCA (1 mG/mL) 30 milliLiter(s) PCA Continuous PCA Continuous  heparin  Injectable 5000 Unit(s) SubCutaneous every 8 hours    MEDICATIONS  (PRN):  acetaminophen    Suspension 650 milliGRAM(s) Oral every 6 hours PRN For Temp greater than 38 C (100.4 F)  ALBUTerol/ipratropium for Nebulization 3 milliLiter(s) Nebulizer every 6 hours PRN Shortness of Breath and/or Wheezing  aluminum hydroxide/magnesium hydroxide/simethicone Suspension 30 milliLiter(s) Oral every 6 hours PRN Dyspepsia  acetaminophen  IVPB. 1000 milliGRAM(s) IV Intermittent once PRN Moderate Pain (4 - 6)  HYDROmorphone PCA (1 mG/mL) Rescue Clinician Bolus 0.5 milliGRAM(s) IV Push every 15 minutes PRN for Pain Scale GREATER THAN 6  naloxone Injectable 0.1 milliGRAM(s) IV Push every 3 minutes PRN For ANY of the following changes in patient status:  A. RR LESS THAN 10 breaths per minute, B. Oxygen saturation LESS THAN 90%, C. Sedation score of 6  ondansetron Injectable 4 milliGRAM(s) IV Push every 6 hours PRN Nausea    Vital Signs Last 24 Hrs  T(C): 36.2 (24 Sep 2017 07:30), Max: 36.9 (23 Sep 2017 20:00)  T(F): 97.2 (24 Sep 2017 07:30), Max: 98.5 (24 Sep 2017 00:00)  HR: 73 (24 Sep 2017 16:15) (57 - 96)  BP: 113/76 (24 Sep 2017 16:15) (90/63 - 123/89)  BP(mean): 84 (24 Sep 2017 16:15) (60 - 97)  RR: 16 (24 Sep 2017 16:15) (15 - 22)  SpO2: 99% (24 Sep 2017 16:15) (96% - 100%)  Mode: AC/ CMV (Assist Control/ Continuous Mandatory Ventilation)  RR (machine): 16  TV (machine): 450  FiO2: 100  PEEP: 5  MAP: 10  PIP: 28    I&O's Summary    23 Sep 2017 07:01  -  24 Sep 2017 07:00  --------------------------------------------------------  IN: 330 mL / OUT: 600 mL / NET: -270 mL    24 Sep 2017 07:01  -  24 Sep 2017 16:27  --------------------------------------------------------  IN: 60 mL / OUT: 435 mL / NET: -375 mL          Physical Exam:   GENERAL: NAD, well-groomed, well-developed  HEENT: GIOVANI/   Atraumatic, Normocephalic  ENMT: No tonsillar erythema, exudates, or enlargement; Moist mucous membranes, Good dentition, No lesions  NECK: Supple, No JVD, Normal thyroid  CHEST/LUNG: Clear to ausculation ; No rales, rhonchi, wheezing, or rubs  CVS: Regular rate and rhythm; No murmurs, rubs, or gallops  GI: : Soft, Nontender, Nondistended; Bowel sounds present  NERVOUS SYSTEM: Intubated and sedated   EXTREMITIES:  2+ Peripheral Pulses, No clubbing, cyanosis, or edema  LYMPH: No lymphadenopathy noted  SKIN: No rashes or lesions  ENDOCRINOLOGY: No Thyromegaly  PSYCH: sedated    Labs:  ABG - ( 24 Sep 2017 13:53 )  pH: 7.30  /  pCO2: 59    /  pO2: 437   / HCO3: 26    / Base Excess: 2.5   /  SaO2: 99.9                          10.7   21.96 )-----------( 138      ( 24 Sep 2017 12:30 )             33.3                         13.0   6.78  )-----------( 131      ( 24 Sep 2017 05:00 )             40.3                         12.8   9.24  )-----------( 129      ( 23 Sep 2017 06:40 )             39.1                         12.4   8.54  )-----------( 132      ( 22 Sep 2017 06:17 )             39.3                         13.1   7.29  )-----------( 127      ( 21 Sep 2017 06:50 )             40.7     09-24    144  |  101  |  22  ----------------------------<  82  4.2   |  31  |  1.15  09-23    140  |  98  |  21  ----------------------------<  86  4.1   |  30  |  1.06  09-22    141  |  100  |  26<H>  ----------------------------<  97  5.1   |  28  |  1.21  09-21    142  |  99  |  20  ----------------------------<  90  4.4   |  30  |  1.07    Ca    9.3      24 Sep 2017 05:00  Ca    9.1      23 Sep 2017 06:40  Mg     1.9     09-24  Mg     1.9     09-23      CAPILLARY BLOOD GLUCOSE            PT/INR - ( 24 Sep 2017 12:30 )   PT: 13.4 SEC;   INR: 1.19          PTT - ( 24 Sep 2017 12:30 )  PTT:20.0 SEC    Cultures:       < from: Xray Chest 1 View AP/PA (09.24.17 @ 15:50) >  EXAM:  AP chests from 9/24/2017 at 1414 at 1522. Compared to prior study from   earlier the same day at 0725.    IMPRESSION:  Interval intubation, initially with tip in left main stem bronchus but   subsequently adjusted with tip ultimately at a satisfactory distance from   the mel.    Interval insertion of 2 left pleural chest tubes.     Increased left upper lung hazy opacity may reflect parenchymal   hemorrhagic change or evolving infiltrate/pneumonia in the proper   clinical context.    Remainder of the chest is unchanged redemonstrating chronic stigmata of   sarcoidosis.    Stable cardiac and mediastinal silhouettes including distorted hilar   shadows.                  CE BUSTILLOS M.D., ATTENDING RADIOLOGIST  This document has been electronically signed. Sep 24 2017  3:29PM        < end of copied text >                        Studies  Chest X-RAY  CT SCAN Chest   Venous Dopplers: LE:   CT Abdomen  Others

## 2017-09-24 NOTE — PROGRESS NOTE ADULT - PROBLEM SELECTOR PLAN 1
no hemoptysis today or last night. Prior pulmonary arterial embolization in the left upper lobe artery: He was told if he hemoptysize again, he may need surgery: Off eliquis.  9/21-s/p bronch-with bleeding noted in DAVID- reported plan for IR embo if hemoptysis again  9/23 + hemoptysis, HD stable, o2 sat 94% on 3l  CTA-suspicious intracavity mycetoma  off eliquis and plavix  spoke with thoracic pa-Thoracic attending will see pt  await thoracic surgery plan  ID consult : hemoptysis -eval for GN/pseudomonal coverage and ?fungal coverage given mycetoma seen on CT/d/w 4 [facility]  9/24/2017:s/p surgery: intubated and sedated at this time. He is on voriconazole

## 2017-09-24 NOTE — PROGRESS NOTE ADULT - PROBLEM SELECTOR PLAN 6
on steroids at this time: it was given only for a short duration: thinking his wheezing is secondary to rhinovirus infection: It will cont till tomorrow: thereafter would taper it down to off in next 5-7 days

## 2017-09-24 NOTE — PRE-OP CHECKLIST - SELECT TESTS ORDERED
EKG/CXR/Type and Screen/Urinalysis/CBC/BMP
Urinalysis/EKG/CBC/Type and Screen/CXR/BMP
CBC/BMP/Type and Cross

## 2017-09-24 NOTE — PROGRESS NOTE ADULT - SUBJECTIVE AND OBJECTIVE BOX
OSCAR CHACKO  MRN-[id number]    HPI:  Patient is a 47 year old male with a PMHx of sarcoidosis, HTN, COPD, and asthma who presents to the ED with severe leg spasms. These intermittent left leg spasms began suddenly two days ago in the patient's home and have become more regular and worsening in contracture. The spasm began in the quad leading to severe erratic left leg movements that can last from seconds to minutes. Once the leg spasm and contracture stops, an "intense tingling and pain sensation" which starts in the foot and radiates up the left side of his body to his neck. Once the tingling sensation reached his neck, the patient became faint, but did not fully lose consciousness. The patient also had a HA at the time of the spasm and tingling which was diffuse throughout the head and rated a 10/10 on a pain scale. He denied ever having this severe of a HA or leg spasm prior this incident; the HA started yesterday and is still present today. Patient denies fever, chills, N/V/D, orthopnea, NPD, sputum, chest pain, leg swelling, and abdominal pain.     In E.D. pt found to have an abnormal EKG, where house cardiology for consulted.   On admission pt found to have moderate inspiratory and expiratory wheezes. (11 Sep 2017 07:22)      Procedure: Right VATS and drainage of Empyema     POD # : 2    Issues: Pain controlled with PCA Dilauded      Interval/Overnight Events:  Pt remained hemodynamically stable overnight, not on any pressors or inotropes. OOB to chair, breathing comfortably with minimal pain. Ambulated several times   A-line d/carmen and will D/C mock midnight      PAST MEDICAL & SURGICAL HISTORY:  History of pneumothorax: History of 3 prior pneumonthoracies.  COPD (chronic obstructive pulmonary disease)  Asthma  Hypertension  Sarcoidosis  No significant past surgical history      ***VITAL SIGNS:  Vital Signs Last 24 Hrs  T(C): 36.8 (24 Sep 2017 17:00), Max: 36.9 (24 Sep 2017 00:00)  T(F): 98.3 (24 Sep 2017 17:00), Max: 98.5 (24 Sep 2017 00:00)  HR: 78 (24 Sep 2017 20:00) (57 - 96)  BP: 104/76 (24 Sep 2017 20:00) (90/63 - 129/90)  BP(mean): 82 (24 Sep 2017 20:00) (60 - 100)  RR: 16 (24 Sep 2017 20:00) (15 - 19)  SpO2: 99% (24 Sep 2017 20:00) (96% - 100%)  I/Os:   I&O's Detail    23 Sep 2017 07:01  -  24 Sep 2017 07:00  --------------------------------------------------------  IN:    lactated ringers.: 330 mL  Total IN: 330 mL    OUT:    Voided: 600 mL  Total OUT: 600 mL    Total NET: -270 mL      24 Sep 2017 07:01  -  24 Sep 2017 20:39  --------------------------------------------------------  IN:    lactated ringers.: 260 mL    phenylephrine   Infusion: 365 mL    propofol Infusion: 43 mL    Sodium Chloride 0.9% IV Bolus: 250 mL  Total IN: 918 mL    OUT:    Chest Tube: 340 mL    Chest Tube: 219 mL    Indwelling Catheter - Urethral: 405 mL  Total OUT: 964 mL    Total NET: -46 mL        CAPILLARY BLOOD GLUCOSE        =======================  VENTILATOR SETTINGS  ===================  Mode: AC/ CMV (Assist Control/ Continuous Mandatory Ventilation)  RR (machine): 16  TV (machine): 450  FiO2: 40  PEEP: 5  MAP: 12  PIP: 34    ======================= PATIENT CARE ACCESS DEVICES ===================  Peripheral IV  Central Venous Line	R	L	IJ	Fem	SC			Placed:   Arterial Line	R	L	PT	DP	Fem	Rad	Ax	Placed:   Midline:				  Urinary Catheter, Date Placed:   Necessity of urinary, arterial, and venous catheters discussed    ======================= PHYSICAL EXAM============================  General:                         Awake, alert, not in any distress  Neuro:                            Moving all extremities to commands. No acute change from baseline exam.	  Respiratory:	Lungs clear to auscultation bilaterally. Good aeration.                                           No rales, rhonchi. Effort even and unlabored.  CV:		Regular rate and rhythm. Normal S1/S2. No murmurs                                          Distal pulses present.  Abdomen:	                     Soft, non-distended. Bowel sounds present / absent.   Skin:		No rash.  Extremities:	Warm, no cyanosis or edema.  Palpable pulses    ============================ LABS =========================                        12.4   27.56 )-----------( 122      ( 24 Sep 2017 16:00 )             38.3     09-24    140  |  102  |  21  ----------------------------<  105<H>  4.6   |  27  |  0.98    Ca    8.0<L>      24 Sep 2017 16:00  Phos  3.6     09-24  Mg     1.8     09-24    TPro  5.6<L>  /  Alb  2.9<L>  /  TBili  1.3<H>  /  DBili  x   /  AST  35  /  ALT  23  /  AlkPhos  36<L>  09-24    LIVER FUNCTIONS - ( 24 Sep 2017 16:00 )  Alb: 2.9 g/dL / Pro: 5.6 g/dL / ALK PHOS: 36 u/L / ALT: 23 u/L / AST: 35 u/L / GGT: x           PT/INR - ( 24 Sep 2017 12:30 )   PT: 13.4 SEC;   INR: 1.19          PTT - ( 24 Sep 2017 12:30 )  PTT:20.0 SEC  ABG - ( 24 Sep 2017 20:00 )  pH: 7.36  /  pCO2: 49    /  pO2: 155   / HCO3: 26    / Base Excess: 2.2   /  SaO2: 99.5                  ======================Microbiology==============================    ===================== IMAGING STUDIES =========================      =====================Cardiac tests======================    ====================ASSESMENT AND PLAN ==============          =======================  MEDICATIONS  =================  MEDICATIONS  (STANDING):  atorvastatin 80 milliGRAM(s) Oral at bedtime  chlorhexidine 4% Liquid 1 Application(s) Topical daily  aspirin  chewable 81 milliGRAM(s) Oral daily  diVALproex  milliGRAM(s) Oral two times a day  buDESOnide  80 MICROgram(s)/formoterol 4.5 MICROgram(s) Inhaler 2 Puff(s) Inhalation two times a day  polyethylene glycol 3350 17 Gram(s) Oral at bedtime  pantoprazole    Tablet 40 milliGRAM(s) Oral before breakfast  ALBUTerol/ipratropium for Nebulization 3 milliLiter(s) Nebulizer every 6 hours  predniSONE   Tablet 40 milliGRAM(s) Oral daily  lactated ringers. 1000 milliLiter(s) (30 mL/Hr) IV Continuous <Continuous>  voriconazole 400 milliGRAM(s) Oral every 12 hours  voriconazole 200 milliGRAM(s) Oral every 12 hours  piperacillin/tazobactam IVPB. 3.375 Gram(s) IV Intermittent every 8 hours  HYDROmorphone PCA (1 mG/mL) 30 milliLiter(s) PCA Continuous PCA Continuous  heparin  Injectable 5000 Unit(s) SubCutaneous every 8 hours  phenylephrine    Infusion 1.3 MICROgram(s)/kG/Min (47.288 mL/Hr) IV Continuous <Continuous>  propofol Infusion 10 MICROgram(s)/kG/Min (5.82 mL/Hr) IV Continuous <Continuous>  lactated ringers. 1000 milliLiter(s) (30 mL/Hr) IV Continuous <Continuous>    MEDICATIONS  (PRN):  acetaminophen    Suspension 650 milliGRAM(s) Oral every 6 hours PRN For Temp greater than 38 C (100.4 F)  ALBUTerol/ipratropium for Nebulization 3 milliLiter(s) Nebulizer every 6 hours PRN Shortness of Breath and/or Wheezing  aluminum hydroxide/magnesium hydroxide/simethicone Suspension 30 milliLiter(s) Oral every 6 hours PRN Dyspepsia  acetaminophen  IVPB. 1000 milliGRAM(s) IV Intermittent once PRN Moderate Pain (4 - 6)  HYDROmorphone PCA (1 mG/mL) Rescue Clinician Bolus 0.5 milliGRAM(s) IV Push every 15 minutes PRN for Pain Scale GREATER THAN 6  naloxone Injectable 0.1 milliGRAM(s) IV Push every 3 minutes PRN For ANY of the following changes in patient status:  A. RR LESS THAN 10 breaths per minute, B. Oxygen saturation LESS THAN 90%, C. Sedation score of 6  ondansetron Injectable 4 milliGRAM(s) IV Push every 6 hours PRN Nausea      ====================== NEUROLOGY=====================  Pain control with PCA /Tylenol IV   Patietn alert, Awake, comfortable, NAD    ==================== RESPIRATORY======================  Pt is on       2     L nasal canula / Face tent___98_% FiO2  Comfortable, not in any distress.  Using incentive spirometry & doing        750        ml  Monitor chest tube output  Chest tube to suction   Continue bronchodilators, pulmonary toilet    ====================CARDIOVASCULAR==================  Continue hemodynamic monitoring.  Continue cardiovascular / antihypertensive medications    ===================== RENAL =========================  D/C IVF  Monitor I/Os and electrolytes  D/C Mock        BPH: Continue Flomax      ==================== GASTROINTESTINAL===================  On regular diet, tolerating  Continue GI prophylaxis with Protonix  Continue Zofran / Reglan for nausea - PRN	    =======================    ENDOCRIN  =====================  Glycemic monitoring  F/S with coverage  ===================HEMATOLOGIC/ONC ===================  Monitor chest tube output. No signs of active bleeding.   Follow CBC in AM  DVT prophylaxis with SCD, sc Heparin    ========================INFECTIOUS DISEASE================  No signs of infection. Monitor for fever / leukocytosis.  All surgical incision / chest tube  sites look clean  D/C Mock      Pertinent clinical, laboratory, radiographic, hemodynamic, echocardiographic, respiratory data, microbiologic data and chart were reviewed and analyzed frequently throughout the course of the day and night. GI and DVT prophylaxis, glycemic control, head of bed elevation and skin care issues were addressed.  Patient seen, examined and plan discussed with CT Surgery / CTICU team during rounds.    I have spent         35      minutes of critical care time with this pt between            am/pm    and               am/ pm      Benito Salas MD OSCAR CHACKO  MRN-[id number]    HPI:  Patient is a 47 year old male with a PMHx of sarcoidosis, HTN, COPD, and asthma who presents to the ED with severe leg spasms. These intermittent left leg spasms began suddenly two days ago in the patient's home and have become more regular and worsening in contracture. The spasm began in the quad leading to severe erratic left leg movements that can last from seconds to minutes. Once the leg spasm and contracture stops, an "intense tingling and pain sensation" which starts in the foot and radiates up the left side of his body to his neck. Once the tingling sensation reached his neck, the patient became faint, but did not fully lose consciousness. The patient also had a HA at the time of the spasm and tingling which was diffuse throughout the head and rated a 10/10 on a pain scale. He denied ever having this severe of a HA or leg spasm prior this incident; the HA started yesterday and is still present today. Patient denies fever, chills, N/V/D, orthopnea, NPD, sputum, chest pain, leg swelling, and abdominal pain.     In E.D. pt found to have an abnormal EKG, where house cardiology for consulted.   On admission pt found to have moderate inspiratory and expiratory wheezes. 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  COPD (chronic obstructive pulmonary disease)  Asthma  Hypertension  Sarcoidosis  No significant past surgical history      ***VITAL SIGNS:  Vital Signs Last 24 Hrs  T(C): 36.8 (24 Sep 2017 17:00), Max: 36.9 (24 Sep 2017 00:00)  T(F): 98.3 (24 Sep 2017 17:00), Max: 98.5 (24 Sep 2017 00:00)  HR: 78 (24 Sep 2017 20:00) (57 - 96)  BP: 104/76 (24 Sep 2017 20:00) (90/63 - 129/90)  BP(mean): 82 (24 Sep 2017 20:00) (60 - 100)  RR: 16 (24 Sep 2017 20:00) (15 - 19)  SpO2: 99% (24 Sep 2017 20:00) (96% - 100%)  I/Os:   I&O's Detail    23 Sep 2017 07:01  -  24 Sep 2017 07:00  --------------------------------------------------------  IN:    lactated ringers.: 330 mL  Total IN: 330 mL    OUT:    Voided: 600 mL  Total OUT: 600 mL    Total NET: -270 mL      24 Sep 2017 07:01  -  24 Sep 2017 20:39  --------------------------------------------------------  IN:    lactated ringers.: 260 mL    phenylephrine   Infusion: 365 mL    propofol Infusion: 43 mL    Sodium Chloride 0.9% IV Bolus: 250 mL  Total IN: 918 mL    OUT:    Chest Tube: 340 mL    Chest Tube: 219 mL    Indwelling Catheter - Urethral: 405 mL  Total OUT: 964 mL    Total NET: -46 mL        CAPILLARY BLOOD GLUCOSE        =======================  VENTILATOR SETTINGS  ===================  Mode: AC/ CMV (Assist Control/ Continuous Mandatory Ventilation)  RR (machine): 16  TV (machine): 450  FiO2: 40  PEEP: 5  MAP: 12  PIP: 34    ======================= PATIENT CARE ACCESS DEVICES ===================  Peripheral IV  Central Venous Line	R	L	IJ	Fem	SC			Placed:   Arterial Line	R	L	PT	DP	Fem	Rad	Ax	Placed:   Midline:				  Urinary Catheter, Date Placed:   Necessity of urinary, arterial, and venous catheters discussed    ======================= PHYSICAL EXAM============================  General:                         Awake, alert, not in any distress  Neuro:                            Moving all extremities to commands. No acute change from baseline exam.	  Respiratory:	Lungs clear to auscultation bilaterally. Good aeration.                                           No rales, rhonchi. Effort even and unlabored.  CV:		Regular rate and rhythm. Normal S1/S2. No murmurs                                          Distal pulses present.  Abdomen:	                     Soft, non-distended. Bowel sounds present / absent.   Skin:		No rash.  Extremities:	Warm, no cyanosis or edema.  Palpable pulses    ============================ LABS =========================                        12.4   27.56 )-----------( 122      ( 24 Sep 2017 16:00 )             38.3     09-24    140  |  102  |  21  ----------------------------<  105<H>  4.6   |  27  |  0.98    Ca    8.0<L>      24 Sep 2017 16:00  Phos  3.6     09-24  Mg     1.8     09-24    TPro  5.6<L>  /  Alb  2.9<L>  /  TBili  1.3<H>  /  DBili  x   /  AST  35  /  ALT  23  /  AlkPhos  36<L>  09-24    LIVER FUNCTIONS - ( 24 Sep 2017 16:00 )  Alb: 2.9 g/dL / Pro: 5.6 g/dL / ALK PHOS: 36 u/L / ALT: 23 u/L / AST: 35 u/L / GGT: x           PT/INR - ( 24 Sep 2017 12:30 )   PT: 13.4 SEC;   INR: 1.19          PTT - ( 24 Sep 2017 12:30 )  PTT:20.0 SEC  ABG - ( 24 Sep 2017 20:00 )  pH: 7.36  /  pCO2: 49    /  pO2: 155   / HCO3: 26    / Base Excess: 2.2   /  SaO2: 99.5                      ===================== IMAGING STUDIES =========================  < from: Xray Chest 1 View AP/PA (09.24.17 @ 15:50) >    EXAM:  RAD CHEST AP PA 1 VIEW      EXAM:  RAD CHEST AP PA 1 VIEW        PROCEDURE DATE:  Sep 24 2017         INTERPRETATION:  CLINICAL INDICATION: follow-up status post left   thoracotomy; left upper lobe lobectomy; hemoptysis; aspergilloma    EXAM:  AP chests from 9/24/2017 at 1414 at 1522. Compared to prior study from   earlier the same day at 0725.    IMPRESSION:  Interval intubation, initially with tip in left main stem bronchus but   subsequently adjusted with tip ultimately at a satisfactory distance from   the mel.    Interval insertion of 2 left pleural chest tubes.     Increased left upper lung hazy opacity may reflect parenchymal   hemorrhagic change or evolving infiltrate/pneumonia in the proper   clinical context.    Remainder of the chest is unchanged redemonstrating chronic stigmata of   sarcoidosis.    Stable cardiac and mediastinal silhouettes including distorted hilar   shadows.      =======================  MEDICATIONS  =================  MEDICATIONS  (STANDING):  atorvastatin 80 milliGRAM(s) Oral at bedtime  chlorhexidine 4% Liquid 1 Application(s) Topical daily  aspirin  chewable 81 milliGRAM(s) Oral daily  diVALproex  milliGRAM(s) Oral two times a day  buDESOnide  80 MICROgram(s)/formoterol 4.5 MICROgram(s) Inhaler 2 Puff(s) Inhalation two times a day  polyethylene glycol 3350 17 Gram(s) Oral at bedtime  pantoprazole    Tablet 40 milliGRAM(s) Oral before breakfast  ALBUTerol/ipratropium for Nebulization 3 milliLiter(s) Nebulizer every 6 hours  predniSONE   Tablet 40 milliGRAM(s) Oral daily  lactated ringers. 1000 milliLiter(s) (30 mL/Hr) IV Continuous <Continuous>  voriconazole 400 milliGRAM(s) Oral every 12 hours  voriconazole 200 milliGRAM(s) Oral every 12 hours  piperacillin/tazobactam IVPB. 3.375 Gram(s) IV Intermittent every 8 hours  HYDROmorphone PCA (1 mG/mL) 30 milliLiter(s) PCA Continuous PCA Continuous  heparin  Injectable 5000 Unit(s) SubCutaneous every 8 hours  phenylephrine    Infusion 1.3 MICROgram(s)/kG/Min (47.288 mL/Hr) IV Continuous <Continuous>  propofol Infusion 10 MICROgram(s)/kG/Min (5.82 mL/Hr) IV Continuous <Continuous>  lactated ringers. 1000 milliLiter(s) (30 mL/Hr) IV Continuous <Continuous>    MEDICATIONS  (PRN):  acetaminophen    Suspension 650 milliGRAM(s) Oral every 6 hours PRN For Temp greater than 38 C (100.4 F)  ALBUTerol/ipratropium for Nebulization 3 milliLiter(s) Nebulizer every 6 hours PRN Shortness of Breath and/or Wheezing  aluminum hydroxide/magnesium hydroxide/simethicone Suspension 30 milliLiter(s) Oral every 6 hours PRN Dyspepsia  acetaminophen  IVPB. 1000 milliGRAM(s) IV Intermittent once PRN Moderate Pain (4 - 6)  HYDROmorphone PCA (1 mG/mL) Rescue Clinician Bolus 0.5 milliGRAM(s) IV Push every 15 minutes PRN for Pain Scale GREATER THAN 6  naloxone Injectable 0.1 milliGRAM(s) IV Push every 3 minutes PRN For ANY of the following changes in patient status:  A. RR LESS THAN 10 breaths per minute, B. Oxygen saturation LESS THAN 90%, C. Sedation score of 6  ondansetron Injectable 4 milliGRAM(s) IV Push every 6 hours PRN Nausea    ====================ASSESMENT AND PLAN ==============  ====================== NEUROLOGY=====================  Pain control with PRN Fentanyl  Patient comfortable, NAD    ==================== RESPIRATORY======================  Post- op Hypercabnic Resp failure,   Wean to Extubate in AM  Sarcoidosis: Continue predniSONE   Tablet 40 milliGRAM(s) Oral daily  Aspergilloma: Continue voriconazole   Comfortable, not in any distress.  Monitor chest tube output  Chest tube to suction   Continue bronchodilators, pulmonary toilet    ====================CARDIOVASCULAR==================  Continue hemodynamic monitoring.  Continue cardiovascular / antihypertensive medications  Wean off ADAIR for -140    ===================== RENAL =========================  Continue IVF  Monitor I/Os and electrolytes  D/C [place]  in AM        ==================== GASTROINTESTINAL===================  Continue GI prophylaxis with Protonix  Continue Zofran / Reglan for nausea - PRN	    =======================    ENDOCRIN  =====================  Glycemic monitoring  F/S with coverage    ===================HEMATOLOGIC/ONC ===================  Monitor chest tube output. No signs of active bleeding.   Follow CBC in AM  DVT prophylaxis with SCD, sc Heparin    ========================INFECTIOUS DISEASE================  Aspergilloma: Continue voriconazole   All surgical incision / chest tube  sites look clean  D/C [place] in AM      Pertinent clinical, laboratory, radiographic, hemodynamic, echocardiographic, respiratory data, microbiologic data and chart were reviewed and analyzed frequently throughout the course of the day and night. GI and DVT prophylaxis, glycemic control, head of bed elevation and skin care issues were addressed.  Patient seen, examined and plan discussed with CT Surgery / CTICU team during rounds.    I have spent   55  minutes of critical care time with this pt.      Benito Salas MD

## 2017-09-25 ENCOUNTER — APPOINTMENT (OUTPATIENT)
Dept: THORACIC SURGERY | Facility: HOSPITAL | Age: 47
End: 2017-09-25
Payer: MEDICAID

## 2017-09-25 LAB
ALBUMIN SERPL ELPH-MCNC: 2.3 G/DL — LOW (ref 3.3–5)
ALBUMIN SERPL ELPH-MCNC: 2.9 G/DL — LOW (ref 3.3–5)
ALP SERPL-CCNC: 28 U/L — LOW (ref 40–120)
ALP SERPL-CCNC: 31 U/L — LOW (ref 40–120)
ALT FLD-CCNC: 16 U/L — SIGNIFICANT CHANGE UP (ref 4–41)
ALT FLD-CCNC: 18 U/L — SIGNIFICANT CHANGE UP (ref 4–41)
APTT BLD: 27 SEC — LOW (ref 27.5–37.4)
APTT BLD: 27.1 SEC — LOW (ref 27.5–37.4)
APTT BLD: 28.7 SEC — SIGNIFICANT CHANGE UP (ref 27.5–37.4)
AST SERPL-CCNC: 31 U/L — SIGNIFICANT CHANGE UP (ref 4–40)
AST SERPL-CCNC: 35 U/L — SIGNIFICANT CHANGE UP (ref 4–40)
BASE EXCESS BLDA CALC-SCNC: 1.8 MMOL/L — SIGNIFICANT CHANGE UP
BASE EXCESS BLDA CALC-SCNC: 2 MMOL/L — SIGNIFICANT CHANGE UP
BASE EXCESS BLDA CALC-SCNC: 2.5 MMOL/L — SIGNIFICANT CHANGE UP
BASE EXCESS BLDA CALC-SCNC: 2.6 MMOL/L — SIGNIFICANT CHANGE UP
BASE EXCESS BLDA CALC-SCNC: 2.7 MMOL/L — SIGNIFICANT CHANGE UP
BASE EXCESS BLDA CALC-SCNC: 2.7 MMOL/L — SIGNIFICANT CHANGE UP
BASE EXCESS BLDA CALC-SCNC: 3.1 MMOL/L — SIGNIFICANT CHANGE UP
BASE EXCESS BLDA CALC-SCNC: 3.3 MMOL/L — SIGNIFICANT CHANGE UP
BASE EXCESS BLDA CALC-SCNC: 3.5 MMOL/L — SIGNIFICANT CHANGE UP
BASE EXCESS BLDA CALC-SCNC: 4 MMOL/L — SIGNIFICANT CHANGE UP
BILIRUB SERPL-MCNC: 1.2 MG/DL — SIGNIFICANT CHANGE UP (ref 0.2–1.2)
BILIRUB SERPL-MCNC: 1.9 MG/DL — HIGH (ref 0.2–1.2)
BUN SERPL-MCNC: 16 MG/DL — SIGNIFICANT CHANGE UP (ref 7–23)
BUN SERPL-MCNC: 18 MG/DL — SIGNIFICANT CHANGE UP (ref 7–23)
BUN SERPL-MCNC: 19 MG/DL — SIGNIFICANT CHANGE UP (ref 7–23)
CA-I BLDA-SCNC: 1.05 MMOL/L — LOW (ref 1.15–1.29)
CA-I BLDA-SCNC: 1.06 MMOL/L — LOW (ref 1.15–1.29)
CA-I BLDA-SCNC: 1.15 MMOL/L — SIGNIFICANT CHANGE UP (ref 1.15–1.29)
CA-I BLDA-SCNC: 1.2 MMOL/L — SIGNIFICANT CHANGE UP (ref 1.15–1.29)
CALCIUM SERPL-MCNC: 7.7 MG/DL — LOW (ref 8.4–10.5)
CALCIUM SERPL-MCNC: 8 MG/DL — LOW (ref 8.4–10.5)
CALCIUM SERPL-MCNC: 8.3 MG/DL — LOW (ref 8.4–10.5)
CHLORIDE BLDA-SCNC: 102 MMOL/L — SIGNIFICANT CHANGE UP (ref 96–108)
CHLORIDE SERPL-SCNC: 102 MMOL/L — SIGNIFICANT CHANGE UP (ref 98–107)
CHLORIDE SERPL-SCNC: 104 MMOL/L — SIGNIFICANT CHANGE UP (ref 98–107)
CHLORIDE SERPL-SCNC: 105 MMOL/L — SIGNIFICANT CHANGE UP (ref 98–107)
CO2 SERPL-SCNC: 26 MMOL/L — SIGNIFICANT CHANGE UP (ref 22–31)
CO2 SERPL-SCNC: 27 MMOL/L — SIGNIFICANT CHANGE UP (ref 22–31)
CO2 SERPL-SCNC: 27 MMOL/L — SIGNIFICANT CHANGE UP (ref 22–31)
CREAT BLDA-MCNC: 1.33 MG/DL — HIGH (ref 0.5–1.3)
CREAT SERPL-MCNC: 1.09 MG/DL — SIGNIFICANT CHANGE UP (ref 0.5–1.3)
CREAT SERPL-MCNC: 1.1 MG/DL — SIGNIFICANT CHANGE UP (ref 0.5–1.3)
CREAT SERPL-MCNC: 1.25 MG/DL — SIGNIFICANT CHANGE UP (ref 0.5–1.3)
CULTURE - ACID FAST SMEAR CONCENTRATED: SIGNIFICANT CHANGE UP
GLUCOSE BLDA-MCNC: 145 MG/DL — HIGH (ref 70–99)
GLUCOSE BLDA-MCNC: 154 MG/DL — HIGH (ref 70–99)
GLUCOSE BLDA-MCNC: 167 MG/DL — HIGH (ref 70–99)
GLUCOSE BLDA-MCNC: 168 MG/DL — HIGH (ref 70–99)
GLUCOSE BLDA-MCNC: 175 MG/DL — HIGH (ref 70–99)
GLUCOSE BLDA-MCNC: 176 MG/DL — HIGH (ref 70–99)
GLUCOSE BLDA-MCNC: 179 MG/DL — HIGH (ref 70–99)
GLUCOSE BLDA-MCNC: 182 MG/DL — HIGH (ref 70–99)
GLUCOSE SERPL-MCNC: 128 MG/DL — HIGH (ref 70–99)
GLUCOSE SERPL-MCNC: 174 MG/DL — HIGH (ref 70–99)
GLUCOSE SERPL-MCNC: 177 MG/DL — HIGH (ref 70–99)
HCO3 BLDA-SCNC: 25 MMOL/L — SIGNIFICANT CHANGE UP (ref 22–26)
HCO3 BLDA-SCNC: 25 MMOL/L — SIGNIFICANT CHANGE UP (ref 22–26)
HCO3 BLDA-SCNC: 26 MMOL/L — SIGNIFICANT CHANGE UP (ref 22–26)
HCO3 BLDA-SCNC: 27 MMOL/L — HIGH (ref 22–26)
HCO3 BLDA-SCNC: 28 MMOL/L — HIGH (ref 22–26)
HCT VFR BLD CALC: 25.3 % — LOW (ref 39–50)
HCT VFR BLD CALC: 28.9 % — LOW (ref 39–50)
HCT VFR BLD CALC: 29.4 % — LOW (ref 39–50)
HCT VFR BLD CALC: 37.8 % — LOW (ref 39–50)
HCT VFR BLDA CALC: 25.7 % — LOW (ref 39–51)
HCT VFR BLDA CALC: 25.7 % — LOW (ref 39–51)
HCT VFR BLDA CALC: 26.5 % — LOW (ref 39–51)
HCT VFR BLDA CALC: 26.8 % — LOW (ref 39–51)
HCT VFR BLDA CALC: 29.1 % — LOW (ref 39–51)
HCT VFR BLDA CALC: 29.7 % — LOW (ref 39–51)
HCT VFR BLDA CALC: 31 % — LOW (ref 39–51)
HCT VFR BLDA CALC: 36.1 % — LOW (ref 39–51)
HGB BLD-MCNC: 10.1 G/DL — LOW (ref 13–17)
HGB BLD-MCNC: 12.5 G/DL — LOW (ref 13–17)
HGB BLD-MCNC: 8.3 G/DL — LOW (ref 13–17)
HGB BLD-MCNC: 9.8 G/DL — LOW (ref 13–17)
HGB BLDA-MCNC: 10 G/DL — LOW (ref 13–17)
HGB BLDA-MCNC: 11.7 G/DL — LOW (ref 13–17)
HGB BLDA-MCNC: 8.3 G/DL — LOW (ref 13–17)
HGB BLDA-MCNC: 8.3 G/DL — LOW (ref 13–17)
HGB BLDA-MCNC: 8.5 G/DL — LOW (ref 13–17)
HGB BLDA-MCNC: 8.6 G/DL — LOW (ref 13–17)
HGB BLDA-MCNC: 9.4 G/DL — LOW (ref 13–17)
HGB BLDA-MCNC: 9.6 G/DL — LOW (ref 13–17)
INR BLD: 1.55 — HIGH (ref 0.88–1.17)
INR BLD: 1.57 — HIGH (ref 0.88–1.17)
INR BLD: 1.82 — HIGH (ref 0.88–1.17)
LACTATE BLDA-SCNC: 1.9 MMOL/L — SIGNIFICANT CHANGE UP (ref 0.5–2)
LACTATE BLDA-SCNC: 1.9 MMOL/L — SIGNIFICANT CHANGE UP (ref 0.5–2)
LACTATE BLDA-SCNC: 2.4 MMOL/L — HIGH (ref 0.5–2)
LACTATE BLDA-SCNC: 2.7 MMOL/L — HIGH (ref 0.5–2)
MAGNESIUM SERPL-MCNC: 1.8 MG/DL — SIGNIFICANT CHANGE UP (ref 1.6–2.6)
MCHC RBC-ENTMCNC: 28.7 PG — SIGNIFICANT CHANGE UP (ref 27–34)
MCHC RBC-ENTMCNC: 28.9 PG — SIGNIFICANT CHANGE UP (ref 27–34)
MCHC RBC-ENTMCNC: 29.3 PG — SIGNIFICANT CHANGE UP (ref 27–34)
MCHC RBC-ENTMCNC: 31.4 PG — SIGNIFICANT CHANGE UP (ref 27–34)
MCHC RBC-ENTMCNC: 32.8 % — SIGNIFICANT CHANGE UP (ref 32–36)
MCHC RBC-ENTMCNC: 33.1 % — SIGNIFICANT CHANGE UP (ref 32–36)
MCHC RBC-ENTMCNC: 33.3 % — SIGNIFICANT CHANGE UP (ref 32–36)
MCHC RBC-ENTMCNC: 34.9 % — SIGNIFICANT CHANGE UP (ref 32–36)
MCV RBC AUTO: 86.2 FL — SIGNIFICANT CHANGE UP (ref 80–100)
MCV RBC AUTO: 88.2 FL — SIGNIFICANT CHANGE UP (ref 80–100)
MCV RBC AUTO: 88.5 FL — SIGNIFICANT CHANGE UP (ref 80–100)
MCV RBC AUTO: 89.8 FL — SIGNIFICANT CHANGE UP (ref 80–100)
NRBC # FLD: 0 — SIGNIFICANT CHANGE UP
NRBC # FLD: 0.02 — SIGNIFICANT CHANGE UP
PCO2 BLDA: 51 MMHG — HIGH (ref 35–48)
PCO2 BLDA: 52 MMHG — HIGH (ref 35–48)
PCO2 BLDA: 52 MMHG — HIGH (ref 35–48)
PCO2 BLDA: 54 MMHG — HIGH (ref 35–48)
PCO2 BLDA: 55 MMHG — HIGH (ref 35–48)
PCO2 BLDA: 56 MMHG — HIGH (ref 35–48)
PCO2 BLDA: 57 MMHG — HIGH (ref 35–48)
PCO2 BLDA: 58 MMHG — HIGH (ref 35–48)
PCO2 BLDA: 78 MMHG — CRITICAL HIGH (ref 35–48)
PCO2 BLDA: 98 MMHG — CRITICAL HIGH (ref 35–48)
PH BLDA: 7.13 PH — CRITICAL LOW (ref 7.35–7.45)
PH BLDA: 7.21 PH — LOW (ref 7.35–7.45)
PH BLDA: 7.31 PH — LOW (ref 7.35–7.45)
PH BLDA: 7.32 PH — LOW (ref 7.35–7.45)
PH BLDA: 7.32 PH — LOW (ref 7.35–7.45)
PH BLDA: 7.33 PH — LOW (ref 7.35–7.45)
PH BLDA: 7.34 PH — LOW (ref 7.35–7.45)
PH BLDA: 7.34 PH — LOW (ref 7.35–7.45)
PH BLDA: 7.36 PH — SIGNIFICANT CHANGE UP (ref 7.35–7.45)
PH BLDA: 7.37 PH — SIGNIFICANT CHANGE UP (ref 7.35–7.45)
PLATELET # BLD AUTO: 126 K/UL — LOW (ref 150–400)
PLATELET # BLD AUTO: 57 K/UL — LOW (ref 150–400)
PLATELET # BLD AUTO: 72 K/UL — LOW (ref 150–400)
PLATELET # BLD AUTO: 93 K/UL — LOW (ref 150–400)
PMV BLD: 12.1 FL — SIGNIFICANT CHANGE UP (ref 7–13)
PMV BLD: 14.1 FL — HIGH (ref 7–13)
PMV BLD: 14.2 FL — HIGH (ref 7–13)
PMV BLD: 14.5 FL — HIGH (ref 7–13)
PO2 BLDA: 101 MMHG — SIGNIFICANT CHANGE UP (ref 83–108)
PO2 BLDA: 105 MMHG — SIGNIFICANT CHANGE UP (ref 83–108)
PO2 BLDA: 157 MMHG — HIGH (ref 83–108)
PO2 BLDA: 170 MMHG — HIGH (ref 83–108)
PO2 BLDA: 180 MMHG — HIGH (ref 83–108)
PO2 BLDA: 199 MMHG — HIGH (ref 83–108)
PO2 BLDA: 244 MMHG — HIGH (ref 83–108)
PO2 BLDA: 440 MMHG — HIGH (ref 83–108)
PO2 BLDA: 457 MMHG — HIGH (ref 83–108)
PO2 BLDA: 75 MMHG — LOW (ref 83–108)
POTASSIUM BLDA-SCNC: 4.7 MMOL/L — HIGH (ref 3.4–4.5)
POTASSIUM BLDA-SCNC: 4.9 MMOL/L — HIGH (ref 3.4–4.5)
POTASSIUM BLDA-SCNC: 4.9 MMOL/L — HIGH (ref 3.4–4.5)
POTASSIUM BLDA-SCNC: 5 MMOL/L — HIGH (ref 3.4–4.5)
POTASSIUM BLDA-SCNC: 5.1 MMOL/L — HIGH (ref 3.4–4.5)
POTASSIUM BLDA-SCNC: 5.2 MMOL/L — HIGH (ref 3.4–4.5)
POTASSIUM SERPL-MCNC: 5.1 MMOL/L — SIGNIFICANT CHANGE UP (ref 3.5–5.3)
POTASSIUM SERPL-MCNC: 5.5 MMOL/L — HIGH (ref 3.5–5.3)
POTASSIUM SERPL-MCNC: 5.7 MMOL/L — HIGH (ref 3.5–5.3)
POTASSIUM SERPL-SCNC: 5.1 MMOL/L — SIGNIFICANT CHANGE UP (ref 3.5–5.3)
POTASSIUM SERPL-SCNC: 5.5 MMOL/L — HIGH (ref 3.5–5.3)
POTASSIUM SERPL-SCNC: 5.7 MMOL/L — HIGH (ref 3.5–5.3)
PROT SERPL-MCNC: 4.1 G/DL — LOW (ref 6–8.3)
PROT SERPL-MCNC: 4.9 G/DL — LOW (ref 6–8.3)
PROTHROM AB SERPL-ACNC: 17.5 SEC — HIGH (ref 9.8–13.1)
PROTHROM AB SERPL-ACNC: 17.7 SEC — HIGH (ref 9.8–13.1)
PROTHROM AB SERPL-ACNC: 20.6 SEC — HIGH (ref 9.8–13.1)
RBC # BLD: 2.87 M/UL — LOW (ref 4.2–5.8)
RBC # BLD: 3.22 M/UL — LOW (ref 4.2–5.8)
RBC # BLD: 3.41 M/UL — LOW (ref 4.2–5.8)
RBC # BLD: 4.27 M/UL — SIGNIFICANT CHANGE UP (ref 4.2–5.8)
RBC # FLD: 13.7 % — SIGNIFICANT CHANGE UP (ref 10.3–14.5)
RBC # FLD: 13.9 % — SIGNIFICANT CHANGE UP (ref 10.3–14.5)
RBC # FLD: 14.2 % — SIGNIFICANT CHANGE UP (ref 10.3–14.5)
RBC # FLD: 14.5 % — SIGNIFICANT CHANGE UP (ref 10.3–14.5)
SAO2 % BLDA: 95.2 % — SIGNIFICANT CHANGE UP (ref 95–99)
SAO2 % BLDA: 96.3 % — SIGNIFICANT CHANGE UP (ref 95–99)
SAO2 % BLDA: 98.1 % — SIGNIFICANT CHANGE UP (ref 95–99)
SAO2 % BLDA: 99.2 % — HIGH (ref 95–99)
SAO2 % BLDA: 99.3 % — HIGH (ref 95–99)
SAO2 % BLDA: 99.4 % — HIGH (ref 95–99)
SAO2 % BLDA: 99.4 % — HIGH (ref 95–99)
SAO2 % BLDA: 99.5 % — HIGH (ref 95–99)
SAO2 % BLDA: 99.7 % — HIGH (ref 95–99)
SAO2 % BLDA: 99.9 % — HIGH (ref 95–99)
SODIUM BLDA-SCNC: 136 MMOL/L — SIGNIFICANT CHANGE UP (ref 136–146)
SODIUM BLDA-SCNC: 136 MMOL/L — SIGNIFICANT CHANGE UP (ref 136–146)
SODIUM BLDA-SCNC: 137 MMOL/L — SIGNIFICANT CHANGE UP (ref 136–146)
SODIUM BLDA-SCNC: 138 MMOL/L — SIGNIFICANT CHANGE UP (ref 136–146)
SODIUM BLDA-SCNC: 138 MMOL/L — SIGNIFICANT CHANGE UP (ref 136–146)
SODIUM BLDA-SCNC: 139 MMOL/L — SIGNIFICANT CHANGE UP (ref 136–146)
SODIUM SERPL-SCNC: 138 MMOL/L — SIGNIFICANT CHANGE UP (ref 135–145)
SODIUM SERPL-SCNC: 140 MMOL/L — SIGNIFICANT CHANGE UP (ref 135–145)
SODIUM SERPL-SCNC: 141 MMOL/L — SIGNIFICANT CHANGE UP (ref 135–145)
SPECIMEN SOURCE: SIGNIFICANT CHANGE UP
SPECIMEN SOURCE: SIGNIFICANT CHANGE UP
WBC # BLD: 23.87 K/UL — HIGH (ref 3.8–10.5)
WBC # BLD: 26.03 K/UL — HIGH (ref 3.8–10.5)
WBC # BLD: 29.28 K/UL — HIGH (ref 3.8–10.5)
WBC # BLD: 30.17 K/UL — HIGH (ref 3.8–10.5)
WBC # FLD AUTO: 23.87 K/UL — HIGH (ref 3.8–10.5)
WBC # FLD AUTO: 26.03 K/UL — HIGH (ref 3.8–10.5)
WBC # FLD AUTO: 29.28 K/UL — HIGH (ref 3.8–10.5)
WBC # FLD AUTO: 30.17 K/UL — HIGH (ref 3.8–10.5)

## 2017-09-25 PROCEDURE — 99232 SBSQ HOSP IP/OBS MODERATE 35: CPT

## 2017-09-25 PROCEDURE — 36556 INSERT NON-TUNNEL CV CATH: CPT

## 2017-09-25 PROCEDURE — 32100 EXPLORATION OF CHEST: CPT

## 2017-09-25 PROCEDURE — 71010: CPT | Mod: 26,76

## 2017-09-25 PROCEDURE — 71010: CPT | Mod: 26,77

## 2017-09-25 PROCEDURE — 99291 CRITICAL CARE FIRST HOUR: CPT | Mod: 25

## 2017-09-25 PROCEDURE — 32663 THORACOSCOPY W/LOBECTOMY: CPT

## 2017-09-25 PROCEDURE — 32100 EXPLORATION OF CHEST: CPT | Mod: 58,59

## 2017-09-25 PROCEDURE — 32110 EXPLORE/REPAIR CHEST: CPT

## 2017-09-25 PROCEDURE — 31622 DX BRONCHOSCOPE/WASH: CPT | Mod: 58,59

## 2017-09-25 PROCEDURE — 93325 DOPPLER ECHO COLOR FLOW MAPG: CPT | Mod: 26,GC

## 2017-09-25 PROCEDURE — 99292 CRITICAL CARE ADDL 30 MIN: CPT | Mod: 25

## 2017-09-25 PROCEDURE — 31622 DX BRONCHOSCOPE/WASH: CPT

## 2017-09-25 PROCEDURE — 32663 THORACOSCOPY W/LOBECTOMY: CPT | Mod: AS

## 2017-09-25 PROCEDURE — 93321 DOPPLER ECHO F-UP/LMTD STD: CPT | Mod: 26

## 2017-09-25 PROCEDURE — 32110 EXPLORE/REPAIR CHEST: CPT | Mod: 58

## 2017-09-25 PROCEDURE — 93308 TTE F-UP OR LMTD: CPT | Mod: 26,GC

## 2017-09-25 RX ORDER — IPRATROPIUM BROMIDE 0.2 MG/ML
1 SOLUTION, NON-ORAL INHALATION EVERY 6 HOURS
Qty: 0 | Refills: 0 | Status: DISCONTINUED | OUTPATIENT
Start: 2017-09-25 | End: 2017-10-10

## 2017-09-25 RX ORDER — ALBUMIN HUMAN 25 %
250 VIAL (ML) INTRAVENOUS ONCE
Qty: 0 | Refills: 0 | Status: COMPLETED | OUTPATIENT
Start: 2017-09-25 | End: 2017-09-25

## 2017-09-25 RX ORDER — DESMOPRESSIN ACETATE 0.1 MG/1
20 TABLET ORAL ONCE
Qty: 0 | Refills: 0 | Status: DISCONTINUED | OUTPATIENT
Start: 2017-09-25 | End: 2017-09-25

## 2017-09-25 RX ORDER — IPRATROPIUM/ALBUTEROL SULFATE 18-103MCG
3 AEROSOL WITH ADAPTER (GRAM) INHALATION EVERY 6 HOURS
Qty: 0 | Refills: 0 | Status: DISCONTINUED | OUTPATIENT
Start: 2017-09-25 | End: 2017-09-25

## 2017-09-25 RX ORDER — PHENYLEPHRINE HYDROCHLORIDE 10 MG/ML
1 INJECTION INTRAVENOUS
Qty: 80 | Refills: 0 | Status: DISCONTINUED | OUTPATIENT
Start: 2017-09-25 | End: 2017-09-25

## 2017-09-25 RX ORDER — PROPOFOL 10 MG/ML
20 INJECTION, EMULSION INTRAVENOUS
Qty: 1000 | Refills: 0 | Status: DISCONTINUED | OUTPATIENT
Start: 2017-09-25 | End: 2017-09-27

## 2017-09-25 RX ORDER — FENTANYL CITRATE 50 UG/ML
50 INJECTION INTRAVENOUS ONCE
Qty: 0 | Refills: 0 | Status: DISCONTINUED | OUTPATIENT
Start: 2017-09-25 | End: 2017-09-26

## 2017-09-25 RX ORDER — HYDROCORTISONE 20 MG
100 TABLET ORAL ONCE
Qty: 0 | Refills: 0 | Status: COMPLETED | OUTPATIENT
Start: 2017-09-25 | End: 2017-09-25

## 2017-09-25 RX ORDER — DORNASE ALFA 1 MG/ML
2.5 SOLUTION RESPIRATORY (INHALATION)
Qty: 0 | Refills: 0 | Status: DISCONTINUED | OUTPATIENT
Start: 2017-09-25 | End: 2017-09-25

## 2017-09-25 RX ORDER — MAGNESIUM SULFATE 500 MG/ML
1 VIAL (ML) INJECTION ONCE
Qty: 0 | Refills: 0 | Status: COMPLETED | OUTPATIENT
Start: 2017-09-25 | End: 2017-09-25

## 2017-09-25 RX ORDER — ACETAMINOPHEN 500 MG
1000 TABLET ORAL ONCE
Qty: 0 | Refills: 0 | Status: COMPLETED | OUTPATIENT
Start: 2017-09-25 | End: 2017-09-25

## 2017-09-25 RX ORDER — ALBUTEROL 90 UG/1
2 AEROSOL, METERED ORAL EVERY 6 HOURS
Qty: 0 | Refills: 0 | Status: DISCONTINUED | OUTPATIENT
Start: 2017-09-25 | End: 2017-10-08

## 2017-09-25 RX ORDER — PROTHROMBIN COMPLEX CONCENTRATE (HUMAN) 25.5; 16.5; 24; 22; 22; 26 [IU]/ML; [IU]/ML; [IU]/ML; [IU]/ML; [IU]/ML; [IU]/ML
1500 POWDER, FOR SOLUTION INTRAVENOUS ONCE
Qty: 0 | Refills: 0 | Status: DISCONTINUED | OUTPATIENT
Start: 2017-09-25 | End: 2017-09-25

## 2017-09-25 RX ORDER — MAGNESIUM SULFATE 500 MG/ML
2 VIAL (ML) INJECTION ONCE
Qty: 0 | Refills: 0 | Status: COMPLETED | OUTPATIENT
Start: 2017-09-25 | End: 2017-09-25

## 2017-09-25 RX ORDER — SODIUM CHLORIDE 9 MG/ML
1000 INJECTION INTRAMUSCULAR; INTRAVENOUS; SUBCUTANEOUS
Qty: 0 | Refills: 0 | Status: DISCONTINUED | OUTPATIENT
Start: 2017-09-25 | End: 2017-10-04

## 2017-09-25 RX ORDER — DOCUSATE SODIUM 100 MG
100 CAPSULE ORAL
Qty: 0 | Refills: 0 | Status: DISCONTINUED | OUTPATIENT
Start: 2017-09-25 | End: 2017-11-02

## 2017-09-25 RX ORDER — NOREPINEPHRINE BITARTRATE/D5W 8 MG/250ML
0.15 PLASTIC BAG, INJECTION (ML) INTRAVENOUS
Qty: 8 | Refills: 0 | Status: DISCONTINUED | OUTPATIENT
Start: 2017-09-25 | End: 2017-09-27

## 2017-09-25 RX ORDER — PHENYLEPHRINE HYDROCHLORIDE 10 MG/ML
2 INJECTION INTRAVENOUS
Qty: 160 | Refills: 0 | Status: DISCONTINUED | OUTPATIENT
Start: 2017-09-25 | End: 2017-10-03

## 2017-09-25 RX ORDER — AMIODARONE HYDROCHLORIDE 400 MG/1
150 TABLET ORAL ONCE
Qty: 0 | Refills: 0 | Status: COMPLETED | OUTPATIENT
Start: 2017-09-25 | End: 2017-09-25

## 2017-09-25 RX ADMIN — ALBUTEROL 2 PUFF(S): 90 AEROSOL, METERED ORAL at 22:57

## 2017-09-25 RX ADMIN — PHENYLEPHRINE HYDROCHLORIDE 36.38 MICROGRAM(S)/KG/MIN: 10 INJECTION INTRAVENOUS at 20:01

## 2017-09-25 RX ADMIN — Medication 1 PUFF(S): at 22:57

## 2017-09-25 RX ADMIN — DORNASE ALFA 2.5 MILLIGRAM(S): 1 SOLUTION RESPIRATORY (INHALATION) at 10:26

## 2017-09-25 RX ADMIN — Medication 125 MILLILITER(S): at 09:39

## 2017-09-25 RX ADMIN — Medication 100 GRAM(S): at 06:17

## 2017-09-25 RX ADMIN — VORICONAZOLE 125 MILLIGRAM(S): 10 INJECTION, POWDER, LYOPHILIZED, FOR SOLUTION INTRAVENOUS at 16:03

## 2017-09-25 RX ADMIN — BUDESONIDE AND FORMOTEROL FUMARATE DIHYDRATE 2 PUFF(S): 160; 4.5 AEROSOL RESPIRATORY (INHALATION) at 10:28

## 2017-09-25 RX ADMIN — Medication 125 MILLILITER(S): at 09:38

## 2017-09-25 RX ADMIN — PIPERACILLIN AND TAZOBACTAM 25 GRAM(S): 4; .5 INJECTION, POWDER, LYOPHILIZED, FOR SOLUTION INTRAVENOUS at 05:08

## 2017-09-25 RX ADMIN — HEPARIN SODIUM 5000 UNIT(S): 5000 INJECTION INTRAVENOUS; SUBCUTANEOUS at 05:08

## 2017-09-25 RX ADMIN — CHLORHEXIDINE GLUCONATE 1 APPLICATION(S): 213 SOLUTION TOPICAL at 07:30

## 2017-09-25 RX ADMIN — VORICONAZOLE 200 MILLIGRAM(S): 10 INJECTION, POWDER, LYOPHILIZED, FOR SOLUTION INTRAVENOUS at 23:04

## 2017-09-25 RX ADMIN — PROPOFOL 11.64 MICROGRAM(S)/KG/MIN: 10 INJECTION, EMULSION INTRAVENOUS at 20:02

## 2017-09-25 RX ADMIN — Medication 3 MILLILITER(S): at 10:25

## 2017-09-25 RX ADMIN — ATORVASTATIN CALCIUM 80 MILLIGRAM(S): 80 TABLET, FILM COATED ORAL at 23:04

## 2017-09-25 RX ADMIN — Medication 50 GRAM(S): at 19:10

## 2017-09-25 RX ADMIN — AMIODARONE HYDROCHLORIDE 618 MILLIGRAM(S): 400 TABLET ORAL at 19:04

## 2017-09-25 RX ADMIN — ALBUTEROL 2 PUFF(S): 90 AEROSOL, METERED ORAL at 03:38

## 2017-09-25 RX ADMIN — Medication 400 MILLIGRAM(S): at 04:42

## 2017-09-25 RX ADMIN — Medication 100 MILLIGRAM(S): at 16:25

## 2017-09-25 RX ADMIN — PIPERACILLIN AND TAZOBACTAM 25 GRAM(S): 4; .5 INJECTION, POWDER, LYOPHILIZED, FOR SOLUTION INTRAVENOUS at 22:59

## 2017-09-25 RX ADMIN — Medication 125 MILLILITER(S): at 17:22

## 2017-09-25 RX ADMIN — Medication 125 MILLILITER(S): at 10:44

## 2017-09-25 RX ADMIN — Medication 1 PUFF(S): at 03:38

## 2017-09-25 NOTE — PROGRESS NOTE ADULT - PROBLEM SELECTOR PLAN 1
no hemoptysis today or last night. Prior pulmonary arterial embolization in the left upper lobe artery: He was told if he hemoptysize again, he may need surgery: Off eliquis.  9/21-s/p bronch-with bleeding noted in DAVID- reported plan for IR embo if hemoptysis again  9/23 + hemoptysis, HD stable, o2 sat 94% on 3l  CTA-suspicious intracavity mycetoma  off eliquis and plavix  spoke with thoracic pa-Thoracic attending will see pt  await thoracic surgery plan  ID consult : hemoptysis -eval for GN/pseudomonal coverage and ?fungal coverage given mycetoma seen on CT/d/w 4 [facility]  9/24/2017:s/p surgery: intubated and sedated at this time. He is on voriconazole  9/25:; s/o reop: increased bleeding through chest tube

## 2017-09-25 NOTE — PROGRESS NOTE ADULT - ASSESSMENT
46 yo man with sarcoidosis, hemoptysis, CT evidence mycetoma s/p left lobe lobectomy 9/24.  Path and cultures testing.   Would continue Zosyn, voriconazole.

## 2017-09-25 NOTE — BRIEF OPERATIVE NOTE - OPERATION/FINDINGS
re-op Left thoracotomy, evacuation of hemothorax (600cc clot, 400cc blood)  no obvious site of bleeding

## 2017-09-25 NOTE — PROCEDURE NOTE - NSPROCDETAILS_GEN_ALL_CORE
patient pre-oxygenated, tube inserted, placement confirmed
guidewire recovered/lumen(s) aspirated and flushed/sterile dressing applied/sterile technique, catheter placed/ultrasound guidance

## 2017-09-25 NOTE — PROGRESS NOTE ADULT - SUBJECTIVE AND OBJECTIVE BOX
Patient is a 47y old  Male who presents with a chief complaint of "My leg wouldn't stop shaking." (14 Sep 2017 09:59)  events noted  had bleeding through chest tube  for reop     Any change in ROS:     MEDICATIONS  (STANDING):  atorvastatin 80 milliGRAM(s) Oral at bedtime  chlorhexidine 4% Liquid 1 Application(s) Topical daily  aspirin  chewable 81 milliGRAM(s) Oral daily  diVALproex  milliGRAM(s) Oral two times a day  buDESOnide  80 MICROgram(s)/formoterol 4.5 MICROgram(s) Inhaler 2 Puff(s) Inhalation two times a day  polyethylene glycol 3350 17 Gram(s) Oral at bedtime  pantoprazole    Tablet 40 milliGRAM(s) Oral before breakfast  predniSONE   Tablet 40 milliGRAM(s) Oral daily  piperacillin/tazobactam IVPB. 3.375 Gram(s) IV Intermittent every 8 hours  HYDROmorphone PCA (1 mG/mL) 30 milliLiter(s) PCA Continuous PCA Continuous  heparin  Injectable 5000 Unit(s) SubCutaneous every 8 hours  voriconazole 200 milliGRAM(s) Oral every 12 hours  voriconazole IVPB 400 milliGRAM(s) IV Intermittent every 12 hours  sodium chloride 0.9%. 1000 milliLiter(s) (30 mL/Hr) IV Continuous <Continuous>  albumin human  5% IVPB 250 milliLiter(s) IV Intermittent once  dornase marion Solution 2.5 milliGRAM(s) Inhalation two times a day  ALBUTerol/ipratropium for Nebulization 3 milliLiter(s) Nebulizer every 6 hours  albumin human  5% IVPB 250 milliLiter(s) IV Intermittent once  albumin human  5% IVPB 250 milliLiter(s) IV Intermittent once  docusate sodium 100 milliGRAM(s) Oral two times a day  phenylephrine    Infusion 2 MICROgram(s)/kG/Min (36.375 mL/Hr) IV Continuous <Continuous>  hydrocortisone sodium succinate Injectable 100 milliGRAM(s) IV Push once  magnesium sulfate  IVPB 2 Gram(s) IV Intermittent once    MEDICATIONS  (PRN):  acetaminophen    Suspension 650 milliGRAM(s) Oral every 6 hours PRN For Temp greater than 38 C (100.4 F)  ALBUTerol/ipratropium for Nebulization 3 milliLiter(s) Nebulizer every 6 hours PRN Shortness of Breath and/or Wheezing  aluminum hydroxide/magnesium hydroxide/simethicone Suspension 30 milliLiter(s) Oral every 6 hours PRN Dyspepsia  acetaminophen  IVPB. 1000 milliGRAM(s) IV Intermittent once PRN Moderate Pain (4 - 6)  HYDROmorphone PCA (1 mG/mL) Rescue Clinician Bolus 0.5 milliGRAM(s) IV Push every 15 minutes PRN for Pain Scale GREATER THAN 6  naloxone Injectable 0.1 milliGRAM(s) IV Push every 3 minutes PRN For ANY of the following changes in patient status:  A. RR LESS THAN 10 breaths per minute, B. Oxygen saturation LESS THAN 90%, C. Sedation score of 6  ondansetron Injectable 4 milliGRAM(s) IV Push every 6 hours PRN Nausea    Vital Signs Last 24 Hrs  T(C): 36.8 (25 Sep 2017 12:00), Max: 38.3 (25 Sep 2017 04:00)  T(F): 98.3 (25 Sep 2017 12:00), Max: 101 (25 Sep 2017 04:00)  HR: 112 (25 Sep 2017 16:23) (73 - 135)  BP: 98/62 (25 Sep 2017 11:30) (98/62 - 114/80)  BP(mean): 70 (25 Sep 2017 11:30) (70 - 88)  RR: 20 (25 Sep 2017 14:00) (12 - 25)  SpO2: 97% (25 Sep 2017 16:23) (93% - 100%)  Mode: AC/ CMV (Assist Control/ Continuous Mandatory Ventilation)  RR (machine): 16  TV (machine): 450  FiO2: 100  PEEP: 5  ITime: 26  MAP: 10  PIP: 26    I&O's Summary    24 Sep 2017 07:01  -  25 Sep 2017 07:00  --------------------------------------------------------  IN: 2849.6 mL / OUT: 2245 mL / NET: 604.6 mL    25 Sep 2017 07:01  -  25 Sep 2017 17:21  --------------------------------------------------------  IN: 1784.6 mL / OUT: 565 mL / NET: 1219.6 mL          Physical Exam:   GENERAL: NAD, well-groomed, well-developed  HEENT: GIOVANI/   Atraumatic, Normocephalic  ENMT: No tonsillar erythema, exudates, or enlargement; Moist mucous membranes, Good dentition, No lesions  NECK: Supple, No JVD, Normal thyroid  CHEST/LUNG: Clear to auscultaion, ; No rales, rhonchi, wheezing, or rubs  CVS: Regular rate and rhythm; No murmurs, rubs, or gallops  GI: : Soft, Nontender, Nondistended; Bowel sounds present  NERVOUS SYSTEM:  Alert & Oriented X3  EXTREMITIES:  2+ Peripheral Pulses, No clubbing, cyanosis, or edema  LYMPH: No lymphadenopathy noted  SKIN: No rashes or lesions  ENDOCRINOLOGY: No Thyromegaly  PSYCH: Appropriate    Labs:  ABG - ( 25 Sep 2017 15:53 )  pH: 7.32  /  pCO2: 58    /  pO2: 440   / HCO3: 27    / Base Excess: 3.1   /  SaO2: 99.7                            8.3    30.17 )-----------( 93       ( 25 Sep 2017 11:45 )             25.3                         12.5   29.28 )-----------( 126      ( 25 Sep 2017 03:20 )             37.8                         12.4   27.56 )-----------( 122      ( 24 Sep 2017 16:00 )             38.3                         10.7   21.96 )-----------( 138      ( 24 Sep 2017 12:30 )             33.3                         13.0   6.78  )-----------( 131      ( 24 Sep 2017 05:00 )             40.3                         12.8   9.24  )-----------( 129      ( 23 Sep 2017 06:40 )             39.1                         12.4   8.54  )-----------( 132      ( 22 Sep 2017 06:17 )             39.3     09-25    140  |  102  |  16  ----------------------------<  128<H>  5.1   |  26  |  1.10  09-24    140  |  102  |  21  ----------------------------<  105<H>  4.6   |  27  |  0.98  09-24    144  |  101  |  22  ----------------------------<  82  4.2   |  31  |  1.15  09-23    140  |  98  |  21  ----------------------------<  86  4.1   |  30  |  1.06  09-22    141  |  100  |  26<H>  ----------------------------<  97  5.1   |  28  |  1.21    Ca    8.3<L>      25 Sep 2017 03:20  Ca    8.0<L>      24 Sep 2017 16:00  Ca    9.3      24 Sep 2017 05:00  Phos  3.6     09-24  Mg     1.8     09-25  Mg     1.8     09-24  Mg     1.9     09-24    TPro  5.6<L>  /  Alb  2.9<L>  /  TBili  1.3<H>  /  DBili  x   /  AST  35  /  ALT  23  /  AlkPhos  36<L>  09-24    CAPILLARY BLOOD GLUCOSE          LIVER FUNCTIONS - ( 24 Sep 2017 16:00 )  Alb: 2.9 g/dL / Pro: 5.6 g/dL / ALK PHOS: 36 u/L / ALT: 23 u/L / AST: 35 u/L / GGT: x           PT/INR - ( 25 Sep 2017 11:45 )   PT: 20.6 SEC;   INR: 1.82          PTT - ( 25 Sep 2017 11:45 )  PTT:28.7 SEC    Lactate, Blood: 1.9 mmol/L (09-24 @ 16:00)  Cultures:             < from: Xray Chest 1 View AP- PORTABLE-Urgent (09.25.17 @ 16:50) >  EXAM:  RAD CHEST PORTABLE URGENT        PROCEDURE DATE:  Sep 25 2017         INTERPRETATION:  Clinical information: Status post surgery.    Findings: Portable semiupright view of the chest dated 9/25/2017 at 4:38   PM is compared to 12:22 PM. Tip of endotracheal tube is 3.8 cm above the   mel. 2 left chest tubes are unchanged. Tip of left IJ line is coiled   overlying the brachiocephalic vein. Postsurgical changes are noted in the   left upper lung. There is a small left apical pneumothorax.Bilateral   emphysematous changes are stable. Cardiomediastinal silhouette cannot be   evaluated.    Impression: ET tube in place.  Small left apical pneumothorax.  Tip of left IJ line coiled overlying brachiocephalic vein.                  NURA DIA M.D., ATTENDING RADIOLOGIST  This document has been electronically signed. Sep 25 2017  4:56PM    < end of copied text >                  Studies  Chest X-RAY  CT SCAN Chest   Venous Dopplers: LE:   CT Abdomen  Others

## 2017-09-25 NOTE — PROGRESS NOTE ADULT - SUBJECTIVE AND OBJECTIVE BOX
[facility]    [place] [address]    Phone:  [phone]       Thank You for choosing us for your health care visit. We are glad to serve you and happy to provide you with this summary of your visit. Please help us to ensure we have accurate records. If you find anything that needs to be changed, please let our staff know as soon as possible.          Your Demographic Information     Patient Name Sex     Tammie Caban Female 1947       Ethnic Group Patient Race    Not of  or  Origin White      Your Visit Details     Date & Time Provider Department    2017 10:40 AM Altaf Maldonado MD [facility]      Your Upcoming Appointment*(Max 10)     2017 12:00 PM CST   Lab Visit with [facility]   [facility] ([facility])    [place] [address] [phone]            2017  2:00 PM CDT   Medicare Well Visit with Altaf Maldonado MD   [facility] ([facility])    [address] [place] [address] [phone]              Your To Do List     Future Orders Please Complete On or Around Expires    CBC & AUTO DIFFERENTIAL  2017 Mar 22, 2017    COMPREHENSIVE METABOLIC PANEL  2017 Mar 22, 2017    MAGNESIUM LEVEL  2017 Mar 22, 2017    THYROID STIMULATING HORMONE REFLEX  2017 Mar 22, 2017      We Ordered or Performed the Following     SERVICE TO CARDIOLOGY       Conditions Discussed Today or Order-Related Diagnoses        Comments    Atrial fibrillation, unspecified type    -  Primary       Your Vitals Were     BP Pulse Height Weight SpO2 BMI    141/84 104 5' 7.5\" (1.715 m) 306 lb (138.8 kg) 95% 47.22 kg/m2    Smoking Status                   Never Smoker           Medications Prescribed or Re-Ordered Today     None      Your Current Medications Are        Disp Refills Start End    Anesthesia Pain Management Service- Attending Addendum    SUBJECTIVE: Pt doing well with IV PCA without problems reported.    Therapy:	  [ X] IV PCA	   [ ] Epidural           [ ] s/p Spinal Opoid              [ ] Postpartum infusion	  [ ] Patient controlled regional anesthesia (PCRA)    [ ] prn Analgesics    Allergies    No Known Allergies    Intolerances      MEDICATIONS  (STANDING):  atorvastatin 80 milliGRAM(s) Oral at bedtime  chlorhexidine 4% Liquid 1 Application(s) Topical daily  aspirin  chewable 81 milliGRAM(s) Oral daily  diVALproex  milliGRAM(s) Oral two times a day  buDESOnide  80 MICROgram(s)/formoterol 4.5 MICROgram(s) Inhaler 2 Puff(s) Inhalation two times a day  polyethylene glycol 3350 17 Gram(s) Oral at bedtime  pantoprazole    Tablet 40 milliGRAM(s) Oral before breakfast  predniSONE   Tablet 40 milliGRAM(s) Oral daily  piperacillin/tazobactam IVPB. 3.375 Gram(s) IV Intermittent every 8 hours  HYDROmorphone PCA (1 mG/mL) 30 milliLiter(s) PCA Continuous PCA Continuous  heparin  Injectable 5000 Unit(s) SubCutaneous every 8 hours  voriconazole 200 milliGRAM(s) Oral every 12 hours  voriconazole IVPB 400 milliGRAM(s) IV Intermittent every 12 hours  sodium chloride 0.9%. 1000 milliLiter(s) (30 mL/Hr) IV Continuous <Continuous>  albumin human  5% IVPB 250 milliLiter(s) IV Intermittent once  dornase marion Solution 2.5 milliGRAM(s) Inhalation two times a day  ALBUTerol/ipratropium for Nebulization 3 milliLiter(s) Nebulizer every 6 hours  albumin human  5% IVPB 250 milliLiter(s) IV Intermittent once  albumin human  5% IVPB 250 milliLiter(s) IV Intermittent once  docusate sodium 100 milliGRAM(s) Oral two times a day  phenylephrine    Infusion 2 MICROgram(s)/kG/Min (36.375 mL/Hr) IV Continuous <Continuous>  hydrocortisone sodium succinate Injectable 100 milliGRAM(s) IV Push once    MEDICATIONS  (PRN):  acetaminophen    Suspension 650 milliGRAM(s) Oral every 6 hours PRN For Temp greater than 38 C (100.4 F)  ALBUTerol/ipratropium for Nebulization 3 milliLiter(s) Nebulizer every 6 hours PRN Shortness of Breath and/or Wheezing  aluminum hydroxide/magnesium hydroxide/simethicone Suspension 30 milliLiter(s) Oral every 6 hours PRN Dyspepsia  acetaminophen  IVPB. 1000 milliGRAM(s) IV Intermittent once PRN Moderate Pain (4 - 6)  HYDROmorphone PCA (1 mG/mL) Rescue Clinician Bolus 0.5 milliGRAM(s) IV Push every 15 minutes PRN for Pain Scale GREATER THAN 6  naloxone Injectable 0.1 milliGRAM(s) IV Push every 3 minutes PRN For ANY of the following changes in patient status:  A. RR LESS THAN 10 breaths per minute, B. Oxygen saturation LESS THAN 90%, C. Sedation score of 6  ondansetron Injectable 4 milliGRAM(s) IV Push every 6 hours PRN Nausea      OBJECTIVE:   [X] No new signs     [ ] Other:    Side Effects:  [X ] None			[ ] Other:    Assessment of Catheter Site:		[ ] Intact		[ ] Other:    ASSESSMENT/PLAN  [ X] Continue current therapy    [ ] Therapy changed to:    [ ] IV PCA       [ ] Epidural     [ ] prn Analgesics     Comments: atenolol (TENORMIN) 100 MG tablet 90 tablet 1 10/3/2016     Sig - Route: Take 1 tablet by mouth daily. - Oral    Class: Eprescribe    lisinopril (ZESTRIL) 20 MG tablet 90 tablet 1 10/3/2016     Sig - Route: Take 1 tablet by mouth daily. - Oral    Class: Eprescribe    oxybutynin (DITROPAN) 5 MG tablet 270 tablet 1 10/3/2016     Sig - Route: Take 1 tablet by mouth 3 times daily. - Oral    Class: Eprescribe    sertraline (ZOLOFT) 100 MG tablet 135 tablet 1 10/3/2016     Sig - Route: Take 1.5 tablets by mouth daily. - Oral    Class: Eprescribe    ALPRAZolam (XANAX) 0.5 MG tablet 30 tablet 2 10/3/2016     Sig - Route: Take 1 tablet by mouth daily. - Oral    Melatonin 5 MG TABS        Sig - Route: Take 1 tablet by mouth as needed. Indications: Trouble Sleeping - Oral    Class: Historical Med    Cholecalciferol (VITAMIN D3) 5000 UNITS CAPS 90 capsule 1 3/28/2014     Sig - Route: Take 1 capsule by mouth daily. - Oral    Class: Eprescribe    calcium carbonate-vitamin D (CALTRATE+D) 600-400 MG-UNIT per tablet        Sig - Route: Take 1 tablet by mouth daily. 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

## 2017-09-25 NOTE — BRIEF OPERATIVE NOTE - PRE-OP DX
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
Hemoptysis  09/21/2017    Active  Rasheed Coley
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

## 2017-09-25 NOTE — BRIEF OPERATIVE NOTE - PROCEDURE
<<-----Click on this checkbox to enter Procedure Thoracotomy  09/25/2017  re-op L thoracotomy, evacuation of hemothorax; RTOR x2  Active  Kassandra Chan

## 2017-09-25 NOTE — BRIEF OPERATIVE NOTE - PROCEDURE POST
<<-----Click on this checkbox to enter Post-Op Dx

## 2017-09-25 NOTE — PROGRESS NOTE ADULT - SUBJECTIVE AND OBJECTIVE BOX
Follow Up:  s/p left upper lobe lobectomy, hemoptysis, mycetoma    Inverval History/ROS: extubated this AM.      Allergies  No Known Allergies        ANTIMICROBIALS:  piperacillin/tazobactam IVPB. 3.375 every 8 hours  voriconazole 200 every 12 hours  voriconazole IVPB 400 every 12 hours      OTHER MEDS:  atorvastatin 80 milliGRAM(s) Oral at bedtime  acetaminophen    Suspension 650 milliGRAM(s) Oral every 6 hours PRN  chlorhexidine 4% Liquid 1 Application(s) Topical daily  ALBUTerol/ipratropium for Nebulization 3 milliLiter(s) Nebulizer every 6 hours PRN  aspirin  chewable 81 milliGRAM(s) Oral daily  diVALproex  milliGRAM(s) Oral two times a day  buDESOnide  80 MICROgram(s)/formoterol 4.5 MICROgram(s) Inhaler 2 Puff(s) Inhalation two times a day  polyethylene glycol 3350 17 Gram(s) Oral at bedtime  aluminum hydroxide/magnesium hydroxide/simethicone Suspension 30 milliLiter(s) Oral every 6 hours PRN  pantoprazole    Tablet 40 milliGRAM(s) Oral before breakfast  predniSONE   Tablet 40 milliGRAM(s) Oral daily  acetaminophen  IVPB. 1000 milliGRAM(s) IV Intermittent once PRN  HYDROmorphone PCA (1 mG/mL) 30 milliLiter(s) PCA Continuous PCA Continuous  HYDROmorphone PCA (1 mG/mL) Rescue Clinician Bolus 0.5 milliGRAM(s) IV Push every 15 minutes PRN  naloxone Injectable 0.1 milliGRAM(s) IV Push every 3 minutes PRN  ondansetron Injectable 4 milliGRAM(s) IV Push every 6 hours PRN  heparin  Injectable 5000 Unit(s) SubCutaneous every 8 hours  sodium chloride 0.9%. 1000 milliLiter(s) IV Continuous <Continuous>  albumin human  5% IVPB 250 milliLiter(s) IV Intermittent once  dornase marion Solution 2.5 milliGRAM(s) Inhalation two times a day  ALBUTerol/ipratropium for Nebulization 3 milliLiter(s) Nebulizer every 6 hours  albumin human  5% IVPB 250 milliLiter(s) IV Intermittent once  albumin human  5% IVPB 250 milliLiter(s) IV Intermittent once  docusate sodium 100 milliGRAM(s) Oral two times a day  phenylephrine    Infusion 2 MICROgram(s)/kG/Min IV Continuous <Continuous>      Vital Signs Last 24 Hrs  T(C): 36.8 (25 Sep 2017 12:00), Max: 38.3 (25 Sep 2017 04:00)  T(F): 98.3 (25 Sep 2017 12:00), Max: 101 (25 Sep 2017 04:00)  HR: 121 (25 Sep 2017 12:00) (61 - 121)  BP: 104/76 (24 Sep 2017 20:00) (90/63 - 129/90)  BP(mean): 82 (24 Sep 2017 20:00) (69 - 100)  RR: 17 (25 Sep 2017 12:00) (12 - 25)  SpO2: 100% (25 Sep 2017 12:00) (93% - 100%)    PHYSICAL EXAM:  General: face tent  HEAD/EYES: [ ] PERRL [ ] white sclera [ ] icterus  ENT:  [ ] normal [ ] supple [ ] thrush [ ] pharyngeal exudate  Cardiovascular:   S1S2  Respiratory:  chest tube left  GI:  [ ] soft, non-tender, normal bowel sounds  :  [ ] mock [ ] no CVA tenderness   Musculoskeletal:  [ ] no synovitis  Neurologic:  [ ] non-focal exam   Skin:  [ ] no rash  Lymph: [ ] no lymphadenopathy  Psychiatric:  [ ] appropriate affect [ ] alert & oriented  Lines:  [ ] no phlebitis [ ] central line                            8.3    30.17 )-----------( 93       ( 25 Sep 2017 11:45 )             25.3       09-25    140  |  102  |  16  ----------------------------<  128<H>  5.1   |  26  |  1.10    Ca    8.3<L>      25 Sep 2017 03:20  Phos  3.6     09-24  Mg     1.8     09-25    TPro  5.6<L>  /  Alb  2.9<L>  /  TBili  1.3<H>  /  DBili  x   /  AST  35  /  ALT  23  /  AlkPhos  36<L>  09-24      MICROBIOLOGY:  OR culture 9/24 testing    path testing    RADIOLOGY:

## 2017-09-25 NOTE — PROGRESS NOTE ADULT - SUBJECTIVE AND OBJECTIVE BOX
Day _2_ of Anesthesia Pain Management Service    Allergies  No Known Allergies    SUBJECTIVE: "It does help"    Pain Scale Score	At rest: _5/10_ 	With Activity: ___ 	[ ] Refer to charted pain scores    THERAPY:    [ ] IV PCA Morphine		[ ] 5 mg/mL	[ ] 1 mg/mL  [X] IV PCA Hydromorphone	[ ] 5 mg/mL	[X] 1 mg/mL  [ ] IV PCA Fentanyl		[ ] 50 micrograms/mL    Demand dose _0.2mg_ lockout _6_ (minutes) Continuous Rate _0_ Total: _0.9mg_  Daily      MEDICATIONS  (STANDING):  atorvastatin 80 milliGRAM(s) Oral at bedtime  chlorhexidine 4% Liquid 1 Application(s) Topical daily  aspirin  chewable 81 milliGRAM(s) Oral daily  diVALproex  milliGRAM(s) Oral two times a day  buDESOnide  80 MICROgram(s)/formoterol 4.5 MICROgram(s) Inhaler 2 Puff(s) Inhalation two times a day  polyethylene glycol 3350 17 Gram(s) Oral at bedtime  pantoprazole    Tablet 40 milliGRAM(s) Oral before breakfast  predniSONE   Tablet 40 milliGRAM(s) Oral daily  piperacillin/tazobactam IVPB. 3.375 Gram(s) IV Intermittent every 8 hours  HYDROmorphone PCA (1 mG/mL) 30 milliLiter(s) PCA Continuous PCA Continuous  heparin  Injectable 5000 Unit(s) SubCutaneous every 8 hours  phenylephrine    Infusion 1.3 MICROgram(s)/kG/Min (47.288 mL/Hr) IV Continuous <Continuous>  propofol Infusion 10 MICROgram(s)/kG/Min (5.82 mL/Hr) IV Continuous <Continuous>  voriconazole 200 milliGRAM(s) Oral every 12 hours  voriconazole IVPB 400 milliGRAM(s) IV Intermittent every 12 hours  sodium chloride 0.9%. 1000 milliLiter(s) (30 mL/Hr) IV Continuous <Continuous>  albumin human  5% IVPB 250 milliLiter(s) IV Intermittent once  dornase marion Solution 2.5 milliGRAM(s) Inhalation two times a day  ALBUTerol/ipratropium for Nebulization 3 milliLiter(s) Nebulizer every 6 hours  albumin human  5% IVPB 250 milliLiter(s) IV Intermittent once  albumin human  5% IVPB 250 milliLiter(s) IV Intermittent once  docusate sodium 100 milliGRAM(s) Oral two times a day  prothrombin complex concentrate IVPB (KCENTRA) 1500 International Unit(s) IV Intermittent once    MEDICATIONS  (PRN):  acetaminophen    Suspension 650 milliGRAM(s) Oral every 6 hours PRN For Temp greater than 38 C (100.4 F)  ALBUTerol/ipratropium for Nebulization 3 milliLiter(s) Nebulizer every 6 hours PRN Shortness of Breath and/or Wheezing  aluminum hydroxide/magnesium hydroxide/simethicone Suspension 30 milliLiter(s) Oral every 6 hours PRN Dyspepsia  acetaminophen  IVPB. 1000 milliGRAM(s) IV Intermittent once PRN Moderate Pain (4 - 6)  HYDROmorphone PCA (1 mG/mL) Rescue Clinician Bolus 0.5 milliGRAM(s) IV Push every 15 minutes PRN for Pain Scale GREATER THAN 6  naloxone Injectable 0.1 milliGRAM(s) IV Push every 3 minutes PRN For ANY of the following changes in patient status:  A. RR LESS THAN 10 breaths per minute, B. Oxygen saturation LESS THAN 90%, C. Sedation score of 6  ondansetron Injectable 4 milliGRAM(s) IV Push every 6 hours PRN Nausea      OBJECTIVE: Lightly Asleep with humidifier face mask in place, easily awakens to verbal stimuli. Orientedx3, NAD, sitting up in bed.     Sedation Score:	[X] Alert	[ ] Drowsy	[ ] Arousable	[ ] Asleep	[ ] Unresponsive    Side Effects:	[X] None	[ ] Nausea	[ ] Vomiting	[ ] Pruritus  		  [ ] Weakness		[ ] Numbness	[ ] Other:    PT/INR - ( 25 Sep 2017 11:45 )   PT: 20.6 SEC;   INR: 1.82     PTT - ( 25 Sep 2017 11:45 )  PTT:28.7 SEC                          8.3    30.17 )-----------( 93       ( 25 Sep 2017 11:45 )             25.3       09-25    140  |  102  |  16  ----------------------------<  128<H>  5.1   |  26  |  1.10    Ca    8.3<L>      25 Sep 2017 03:20  Phos  3.6     09-24  Mg     1.8     09-25    TPro  5.6<L>  /  Alb  2.9<L>  /  TBili  1.3<H>  /  DBili  x   /  AST  35  /  ALT  23  /  AlkPhos  36<L>  09-24      ASSESSMENT/ PLAN    Therapy to  be:	[X] Continue   [ ] Discontinued   [ ] Change to prn Analgesics    Documentation and Verification of current medications:  [X] Done	[ ] Not done, not eligible  [ ] Not done, reason not given    Comments:  Post-extubation  Use of IV PCA reinforced

## 2017-09-25 NOTE — PROGRESS NOTE ADULT - SUBJECTIVE AND OBJECTIVE BOX
OSCAR CHACKO  MRN-[id number]    HPI:  Patient is a 47 year old male with a PMHx of sarcoidosis, HTN, COPD, and asthma who presents to the ED with severe leg spasms. These intermittent left leg spasms began suddenly two days ago in the patient's home and have become more regular and worsening in contracture. The spasm began in the quad leading to severe erratic left leg movements that can last from seconds to minutes. Once the leg spasm and contracture stops, an "intense tingling and pain sensation" which starts in the foot and radiates up the left side of his body to his neck. Once the tingling sensation reached his neck, the patient became faint, but did not fully lose consciousness. The patient also had a HA at the time of the spasm and tingling which was diffuse throughout the head and rated a 10/10 on a pain scale. He denied ever having this severe of a HA or leg spasm prior this incident; the HA started yesterday and is still present today. Patient denies fever, chills, N/V/D, orthopnea, NPD, sputum, chest pain, leg swelling, and abdominal pain.     In E.D. pt found to have an abnormal EKG, where house cardiology for consulted.   On admission pt found to have moderate inspiratory and expiratory wheezes. 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  COPD (chronic obstructive pulmonary disease)  Asthma  Hypertension  Sarcoidosis  No significant past surgical history      ***VITAL SIGNS:  Vital Signs Last 24 Hrs  T(C): 38.6 (26 Sep 2017 04:00), Max: 38.6 (26 Sep 2017 03:00)  T(F): 101.4 (26 Sep 2017 04:00), Max: 101.4 (26 Sep 2017 03:00)  HR: 124 (26 Sep 2017 06:00) (75 - 150)  BP: 98/68 (26 Sep 2017 04:00) (87/61 - 151/100)  BP(mean): 75 (26 Sep 2017 04:00) (63 - 113)  RR: 16 (26 Sep 2017 06:00) (14 - 25)  SpO2: 99% (26 Sep 2017 06:00) (93% - 100%)  I/Os:   I&O's Detail    25 Sep 2017 07:01  -  26 Sep 2017 07:00  --------------------------------------------------------  IN:    FFP (Fresh Frozen Plasma): 620 mL    IV PiggyBack: 1000 mL    norepinephrine Infusion: 333.8 mL    Packed Red Blood Cells: 1240 mL    phenylephrine   Infusion: 497.2 mL    phenylephrine   Infusion: 447 mL    Platelets - Single Donor: 310 mL    propofol Infusion: 192.5 mL    Sodium Chloride 0.9% IV Bolus: 250 mL    sodium chloride 0.9%.: 570 mL  Total IN: 5460.5 mL    OUT:    Chest Tube: 1565 mL    Chest Tube: 1320 mL    Indwelling Catheter - Urethral: 930 mL  Total OUT: 3815 mL    Total NET: 1645.5 mL        CAPILLARY BLOOD GLUCOSE        =======================  VENTILATOR SETTINGS  ===================  Mode: AC/ CMV (Assist Control/ Continuous Mandatory Ventilation)  RR (machine): 16  TV (machine): 450  FiO2: 40  PEEP: 5  MAP: 12  PIP: 35    ======================= PATIENT CARE ACCESS DEVICES ===================  Peripheral IV  Central Venous Line: L	IJ	Placed: 9/25/17  Arterial Line	L	Rad	Placed: 9/24/17    Necessity of urinary, arterial, and venous catheters discussed    ======================= PHYSICAL EXAM============================  General:                         sedated, vented, in critical condition  Neuro:                          Sedated, Vented, 	  Respiratory:	Lungs clear to auscultation bilaterally. Good aeration. + rhonchi. Vented  CV:		Regular rate and rhythm. Normal S1/S2. No murmurs, chest tubes X2 to Left                                          Distal pulses present.  Abdomen:	                     Soft, non-distended. Bowel sounds present   Skin:		No rash.  Extremities:	Warm, no cyanosis or edema.  Palpable pulses    ============================ LABS =========================                        10.8   28.86 )-----------( 74       ( 26 Sep 2017 03:00 )             30.3     09-26    141  |  105  |  17  ----------------------------<  141<H>  5.9<H>   |  28  |  1.22    Ca    7.9<L>      26 Sep 2017 03:00  Phos  3.6     09-24  Mg     1.8     09-26    TPro  4.8<L>  /  Alb  2.7<L>  /  TBili  1.3<H>  /  DBili  x   /  AST  43<H>  /  ALT  19  /  AlkPhos  31<L>  09-26    LIVER FUNCTIONS - ( 26 Sep 2017 03:00 )  Alb: 2.7 g/dL / Pro: 4.8 g/dL / ALK PHOS: 31 u/L / ALT: 19 u/L / AST: 43 u/L / GGT: x           PT/INR - ( 26 Sep 2017 03:00 )   PT: 15.8 SEC;   INR: 1.40          PTT - ( 26 Sep 2017 03:00 )  PTT:26.7 SEC  ABG - ( 26 Sep 2017 03:00 )  pH: 7.39  /  pCO2: 53    /  pO2: 177   / HCO3: 29    / Base Excess: 5.9   /  SaO2: 99.5        ===================== IMAGING STUDIES =========================      Findings: Portable semiupright view of the chest dated 9/25/2017 at 4:38   PM is compared to 12:22 PM. Tip of endotracheal tube is 3.8 cm above the   mel. 2 left chest tubes are unchanged. Tip of left IJ line is coiled   overlying the brachiocephalic vein. Postsurgical changes are noted in the   left upper lung. There is a small left apical pneumothorax.Bilateral   emphysematous changes are stable. Cardiomediastinal silhouette cannot be   evaluated.    Impression: ET tube in place.  Small left apical pneumothorax.  Tip of left IJ line coiled overlying brachiocephalic vein.      =====================Cardiac tests======================    ECHO: limited, no Pericardial effusion  =======================  MEDICATIONS  =================  MEDICATIONS  (STANDING):  atorvastatin 80 milliGRAM(s) Oral at bedtime  chlorhexidine 4% Liquid 1 Application(s) Topical daily  aspirin  chewable 81 milliGRAM(s) Oral daily  polyethylene glycol 3350 17 Gram(s) Oral at bedtime  piperacillin/tazobactam IVPB. 3.375 Gram(s) IV Intermittent every 8 hours  HYDROmorphone PCA (1 mG/mL) 30 milliLiter(s) PCA Continuous PCA Continuous  heparin  Injectable 5000 Unit(s) SubCutaneous every 8 hours  voriconazole 200 milliGRAM(s) Oral every 12 hours  sodium chloride 0.9%. 1000 milliLiter(s) (30 mL/Hr) IV Continuous <Continuous>  docusate sodium 100 milliGRAM(s) Oral two times a day  phenylephrine    Infusion 2 MICROgram(s)/kG/Min (36.375 mL/Hr) IV Continuous <Continuous>  propofol Infusion 20 MICROgram(s)/kG/Min (11.64 mL/Hr) IV Continuous <Continuous>  ALBUTerol    90 MICROgram(s) HFA Inhaler 2 Puff(s) Inhalation every 6 hours  ipratropium 17 MICROgram(s) HFA Inhaler 1 Puff(s) Inhalation every 6 hours  norepinephrine Infusion 0.15 MICROgram(s)/kG/Min (27.281 mL/Hr) IV Continuous <Continuous>  valproic  acid Syrup 500 milliGRAM(s) Oral two times a day  pantoprazole  Injectable 40 milliGRAM(s) IV Push every 12 hours    MEDICATIONS  (PRN):  acetaminophen    Suspension 650 milliGRAM(s) Oral every 6 hours PRN For Temp greater than 38 C (100.4 F)  aluminum hydroxide/magnesium hydroxide/simethicone Suspension 30 milliLiter(s) Oral every 6 hours PRN Dyspepsia  acetaminophen  IVPB. 1000 milliGRAM(s) IV Intermittent once PRN Moderate Pain (4 - 6)  HYDROmorphone PCA (1 mG/mL) Rescue Clinician Bolus 0.5 milliGRAM(s) IV Push every 15 minutes PRN for Pain Scale GREATER THAN 6  naloxone Injectable 0.1 milliGRAM(s) IV Push every 3 minutes PRN For ANY of the following changes in patient status:  A. RR LESS THAN 10 breaths per minute, B. Oxygen saturation LESS THAN 90%, C. Sedation score of 6  ondansetron Injectable 4 milliGRAM(s) IV Push every 6 hours PRN Nausea      ====================== NEUROLOGY=====================  Pain control with PCA / PCEA / Tylenol IV / Toradol / Percocet  Pt is on Precedex for agitation  Pt is sedated with Propofol / Fentanyl    ==================== RESPIRATORY======================  Pt is on            L nasal canula / Face tent____% FiO2  Comfortable, not in any distress.  Using incentive spirometry & doing                ml  Monitor chest tube output  Chest tube to suction / water seal	    Mechanical Ventilation:  Mode: AC/ CMV (Assist Control/ Continuous Mandatory Ventilation)  RR (machine): 16  TV (machine): 450  FiO2: 50  PEEP: 5  MAP: 12  PIP: 35    Mechanical ventilator staus assessed & settings reviewed  Continue bronchodilators, pulmonary toilet    ====================CARDIOVASCULAR==================  Continue hemodynamic monitoring.  Not on any pressors  Continue cardiovascular / antihypertensive medications    ===================== RENAL =========================  Continue LR 30CC/hr      D/C IVF  Monitor I/Os and electrolytes  D/C [place]      Keep Pickens  BPH: Continue Flomax      ==================== GASTROINTESTINAL===================  On regular diet, tolerating  Continue GI prophylaxis with Pepcid / Protonix  Continue Zofran / Reglan for nausea - PRN	  NPO    =======================    ENDOCRIN  =====================  Glycemic monitoring  F/S with coverage  ===================HEMATOLOGIC/ONC ===================  Monitor chest tube output. No signs of active bleeding.   Follow CBC in AM  DVT prophylaxis with SCD, sc Heparin    ========================INFECTIOUS DISEASE================  No signs of infection. Monitor for fever / leukocytosis.  All surgical incision / chest tube  sites look clean  D/C [place]      Pertinent clinical, laboratory, radiographic, hemodynamic, echocardiographic, respiratory data, microbiologic data and chart were reviewed and analyzed frequently throughout the course of the day and night. GI and DVT prophylaxis, glycemic control, head of bed elevation and skin care issues were addressed.  Patient seen, examined and plan discussed with CT Surgery / CTICU team during rounds.    I have spent               minutes of critical care time with this pt between            am/pm    and               am/ pm      Flor Pepper DO, MAGUE OSCAR CHACKO  MRN-[id number]    HPI:  Patient is a 47 year old male with a PMHx of sarcoidosis, HTN, COPD, and asthma who presents to the ED with severe leg spasms. These intermittent left leg spasms began suddenly two days ago in the patient's home and have become more regular and worsening in contracture. The spasm began in the quad leading to severe erratic left leg movements that can last from seconds to minutes. Once the leg spasm and contracture stops, an "intense tingling and pain sensation" which starts in the foot and radiates up the left side of his body to his neck. Once the tingling sensation reached his neck, the patient became faint, but did not fully lose consciousness. The patient also had a HA at the time of the spasm and tingling which was diffuse throughout the head and rated a 10/10 on a pain scale. He denied ever having this severe of a HA or leg spasm prior this incident; the HA started yesterday and is still present today. Patient denies fever, chills, N/V/D, orthopnea, NPD, sputum, chest pain, leg swelling, and abdominal pain.     In E.D. pt found to have an abnormal EKG, where house cardiology for consulted.   On admission pt found to have moderate inspiratory and expiratory wheezes. 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  COPD (chronic obstructive pulmonary disease)  Asthma  Hypertension  Sarcoidosis  No significant past surgical history      ***VITAL SIGNS:  Vital Signs Last 24 Hrs  T(C): 38.6 (26 Sep 2017 04:00), Max: 38.6 (26 Sep 2017 03:00)  T(F): 101.4 (26 Sep 2017 04:00), Max: 101.4 (26 Sep 2017 03:00)  HR: 124 (26 Sep 2017 06:00) (75 - 150)  BP: 98/68 (26 Sep 2017 04:00) (87/61 - 151/100)  BP(mean): 75 (26 Sep 2017 04:00) (63 - 113)  RR: 16 (26 Sep 2017 06:00) (14 - 25)  SpO2: 99% (26 Sep 2017 06:00) (93% - 100%)  I/Os:   I&O's Detail    25 Sep 2017 07:01  -  26 Sep 2017 07:00  --------------------------------------------------------  IN:    FFP (Fresh Frozen Plasma): 620 mL    IV PiggyBack: 1000 mL    norepinephrine Infusion: 333.8 mL    Packed Red Blood Cells: 1240 mL    phenylephrine   Infusion: 497.2 mL    phenylephrine   Infusion: 447 mL    Platelets - Single Donor: 310 mL    propofol Infusion: 192.5 mL    Sodium Chloride 0.9% IV Bolus: 250 mL    sodium chloride 0.9%.: 570 mL  Total IN: 5460.5 mL    OUT:    Chest Tube: 1565 mL    Chest Tube: 1320 mL    Indwelling Catheter - Urethral: 930 mL  Total OUT: 3815 mL    Total NET: 1645.5 mL        CAPILLARY BLOOD GLUCOSE        =======================  VENTILATOR SETTINGS  ===================  Mode: AC/ CMV (Assist Control/ Continuous Mandatory Ventilation)  RR (machine): 16  TV (machine): 450  FiO2: 40  PEEP: 5  MAP: 12  PIP: 35    ======================= PATIENT CARE ACCESS DEVICES ===================  Peripheral IV  Central Venous Line: L	IJ	Placed: 9/25/17  Arterial Line	L	Rad	Placed: 9/24/17    Necessity of urinary, arterial, and venous catheters discussed    ======================= PHYSICAL EXAM============================  General:                         sedated, vented, in critical condition  Neuro:                          Sedated, Vented, 	  Respiratory:	Lungs clear to auscultation bilaterally. Good aeration. + rhonchi. Vented  CV:		Regular rate and rhythm. Normal S1/S2. No murmurs, chest tubes X2 to Left                                          Distal pulses present.  Abdomen:	                     Soft, non-distended. Bowel sounds present   Skin:		No rash.  Extremities:	Warm, no cyanosis or edema.  Palpable pulses    ============================ LABS =========================                        10.8   28.86 )-----------( 74       ( 26 Sep 2017 03:00 )             30.3     09-26    141  |  105  |  17  ----------------------------<  141<H>  5.9<H>   |  28  |  1.22    Ca    7.9<L>      26 Sep 2017 03:00  Phos  3.6     09-24  Mg     1.8     09-26    TPro  4.8<L>  /  Alb  2.7<L>  /  TBili  1.3<H>  /  DBili  x   /  AST  43<H>  /  ALT  19  /  AlkPhos  31<L>  09-26    LIVER FUNCTIONS - ( 26 Sep 2017 03:00 )  Alb: 2.7 g/dL / Pro: 4.8 g/dL / ALK PHOS: 31 u/L / ALT: 19 u/L / AST: 43 u/L / GGT: x           PT/INR - ( 26 Sep 2017 03:00 )   PT: 15.8 SEC;   INR: 1.40          PTT - ( 26 Sep 2017 03:00 )  PTT:26.7 SEC  ABG - ( 26 Sep 2017 03:00 )  pH: 7.39  /  pCO2: 53    /  pO2: 177   / HCO3: 29    / Base Excess: 5.9   /  SaO2: 99.5        ===================== IMAGING STUDIES =========================      Findings: Portable semiupright view of the chest dated 9/25/2017 at 4:38   PM is compared to 12:22 PM. Tip of endotracheal tube is 3.8 cm above the   mel. 2 left chest tubes are unchanged. Tip of left IJ line is coiled   overlying the brachiocephalic vein. Postsurgical changes are noted in the   left upper lung. There is a small left apical pneumothorax.Bilateral   emphysematous changes are stable. Cardiomediastinal silhouette cannot be   evaluated.    Impression: ET tube in place.  Small left apical pneumothorax.  Tip of left IJ line coiled overlying brachiocephalic vein.      =====================Cardiac tests======================    ECHO: limited, no Pericardial effusion  =======================  MEDICATIONS  =================  MEDICATIONS  (STANDING):  atorvastatin 80 milliGRAM(s) Oral at bedtime  chlorhexidine 4% Liquid 1 Application(s) Topical daily  aspirin  chewable 81 milliGRAM(s) Oral daily  polyethylene glycol 3350 17 Gram(s) Oral at bedtime  piperacillin/tazobactam IVPB. 3.375 Gram(s) IV Intermittent every 8 hours  HYDROmorphone PCA (1 mG/mL) 30 milliLiter(s) PCA Continuous PCA Continuous  heparin  Injectable 5000 Unit(s) SubCutaneous every 8 hours  voriconazole 200 milliGRAM(s) Oral every 12 hours  sodium chloride 0.9%. 1000 milliLiter(s) (30 mL/Hr) IV Continuous <Continuous>  docusate sodium 100 milliGRAM(s) Oral two times a day  phenylephrine    Infusion 2 MICROgram(s)/kG/Min (36.375 mL/Hr) IV Continuous <Continuous>  propofol Infusion 20 MICROgram(s)/kG/Min (11.64 mL/Hr) IV Continuous <Continuous>  ALBUTerol    90 MICROgram(s) HFA Inhaler 2 Puff(s) Inhalation every 6 hours  ipratropium 17 MICROgram(s) HFA Inhaler 1 Puff(s) Inhalation every 6 hours  norepinephrine Infusion 0.15 MICROgram(s)/kG/Min (27.281 mL/Hr) IV Continuous <Continuous>  valproic  acid Syrup 500 milliGRAM(s) Oral two times a day  pantoprazole  Injectable 40 milliGRAM(s) IV Push every 12 hours    MEDICATIONS  (PRN):  acetaminophen    Suspension 650 milliGRAM(s) Oral every 6 hours PRN For Temp greater than 38 C (100.4 F)  aluminum hydroxide/magnesium hydroxide/simethicone Suspension 30 milliLiter(s) Oral every 6 hours PRN Dyspepsia  acetaminophen  IVPB. 1000 milliGRAM(s) IV Intermittent once PRN Moderate Pain (4 - 6)  HYDROmorphone PCA (1 mG/mL) Rescue Clinician Bolus 0.5 milliGRAM(s) IV Push every 15 minutes PRN for Pain Scale GREATER THAN 6  naloxone Injectable 0.1 milliGRAM(s) IV Push every 3 minutes PRN For ANY of the following changes in patient status:  A. RR LESS THAN 10 breaths per minute, B. Oxygen saturation LESS THAN 90%, C. Sedation score of 6  ondansetron Injectable 4 milliGRAM(s) IV Push every 6 hours PRN Nausea    pt intubated and sedated with Propofol, Pain control with PRN Fentanyl    ==================== RESPIRATORY======================  Intubated, vent supported  Comfortable, not in any distress.  Using incentive spirometry  Monitor chest tube output  Chest tube x2 to suction    Mechanical Ventilation:  Mode: AC/ CMV (Assist Control/ Continuous Mandatory Ventilation)  RR (machine): 16  TV (machine): 450  FiO2:40  PEEP: 5  MAP: 12  PIP: 35    Mechanical ventilator staus assessed & settings reviewed  Continue bronchodilators, pulmonary toilet    ====================CARDIOVASCULAR==================  Continue hemodynamic monitoring. monitor chest tube output  Hypotension, on double pressors, wean as so, pt on [place]/ norepinepherine drip  ECHO study limited, due to emergently to OR    ===================== RENAL =========================  Monitor I/Os and electrolytes, Monitor renal function, avoid toxins   Keep [place]    ==================== GASTROINTESTINAL===================  Continue GI prophylaxis with  Protonix iv  Continue Zofran / Reglan for nausea - PRN	  NPO    =======================    ENDOCRIN  =====================  Glycemic monitoring  F/S with coverage  ===================HEMATOLOGIC/ONC ===================  Monitor chest tube output. No signs of active bleeding.   Follow CBC in AM  DVT prophylaxis with SCD, hold Heparin    ========================INFECTIOUS DISEASE================  No signs of infection. Monitor for fever / leukocytosis.  All surgical incision / chest tube  sites look clean        Pertinent clinical, laboratory, radiographic, hemodynamic, echocardiographic, respiratory data, microbiologic data and chart were reviewed and analyzed frequently throughout the course of the day and night. GI and DVT prophylaxis, glycemic control, head of bed elevation and skin care issues were addressed.  Patient seen, examined and plan discussed with CT Surgery / CTICU team during rounds.    I have spent   150  minutes of critical care time with this pt between 7 am   and 7pm      Flor Pepper DO, FACEP

## 2017-09-25 NOTE — BRIEF OPERATIVE NOTE - OPERATION/FINDINGS
second RTOR; redo L thoracotomy, evacuation of hemothorax; 100cc clot; 200cc blood  bleeding posterior intercostal arteries identified and cauterized

## 2017-09-25 NOTE — BRIEF OPERATIVE NOTE - PROCEDURE
<<-----Click on this checkbox to enter Procedure Thoracotomy  09/25/2017  re-op L thoracotomy, evacuation of hemothorax  Active  JCASTEL

## 2017-09-25 NOTE — BRIEF OPERATIVE NOTE - ASSISTANT(S)
Dr Alvarado, YUDITH Mcdermott
Ray Fields MD, Kassandra ZURITA
YUDITH Rivero
Kassandra ZURITA; Rasheed ZURITA

## 2017-09-25 NOTE — CHART NOTE - NSCHARTNOTEFT_GEN_A_CORE
NUTRITION FOLLOW-UP:  Pt S/P surgery for thoractomy w/lung lobectomy yesterday 2/2 hemoptysis.  Pt extubated this AM.  NPO x 2 days.  Pt previously receiving DASH/TLC diet.  Wt stable x 8 days.      Weight:  9/24 - 97kg     9/16 - 95.4kg     IBW: 77.7kg  Labs:  H/H 8.3/25.3  Glu 128  T.Ca 8.3  pCO2 55  Alb 2.9  Current Diet:  NPO    Nutrition Recommendations: When medically feasible, initiate Clear Liquid diet and advance as tolerated to DASH/TLC diet as prior to surgery.  Est kcal needs = 25-30kcal/kg IBW= 1940-2330kcal/kg.  Protein needs = ~1.2-1.5gm/kg IBW= .5gms/d     RD to Remain Available:  yes    Additional Recommendations:   1) Monitor weights, labs, BM's, skin integrity, p.o. intake.   2) Re-start po diet when medically appropriate w/clears and advance to solid foods as tolerated.

## 2017-09-25 NOTE — BRIEF OPERATIVE NOTE - PRE-OP
<<-----Click on this checkbox to enter Pre-Op Dx

## 2017-09-25 NOTE — PROCEDURE NOTE - PROCEDURE
<<-----Click on this checkbox to enter Procedure Central venous catheter placement with ultrasound guidance  09/25/2017    Active  SARAI

## 2017-09-26 ENCOUNTER — TRANSCRIPTION ENCOUNTER (OUTPATIENT)
Age: 47
End: 2017-09-26

## 2017-09-26 LAB
ALBUMIN SERPL ELPH-MCNC: 2.7 G/DL — LOW (ref 3.3–5)
ALP SERPL-CCNC: 31 U/L — LOW (ref 40–120)
ALT FLD-CCNC: 19 U/L — SIGNIFICANT CHANGE UP (ref 4–41)
APTT BLD: 26.7 SEC — LOW (ref 27.5–37.4)
AST SERPL-CCNC: 43 U/L — HIGH (ref 4–40)
BASE EXCESS BLDA CALC-SCNC: 5.9 MMOL/L — SIGNIFICANT CHANGE UP
BASE EXCESS BLDA CALC-SCNC: 6.6 MMOL/L — SIGNIFICANT CHANGE UP
BASE EXCESS BLDV CALC-SCNC: 4.1 MMOL/L — SIGNIFICANT CHANGE UP
BASOPHILS # BLD AUTO: 0.03 K/UL — SIGNIFICANT CHANGE UP (ref 0–0.2)
BASOPHILS NFR BLD AUTO: 0.1 % — SIGNIFICANT CHANGE UP (ref 0–2)
BILIRUB SERPL-MCNC: 1.3 MG/DL — HIGH (ref 0.2–1.2)
BUN SERPL-MCNC: 16 MG/DL — SIGNIFICANT CHANGE UP (ref 7–23)
BUN SERPL-MCNC: 17 MG/DL — SIGNIFICANT CHANGE UP (ref 7–23)
BUN SERPL-MCNC: 17 MG/DL — SIGNIFICANT CHANGE UP (ref 7–23)
CALCIUM SERPL-MCNC: 7.1 MG/DL — LOW (ref 8.4–10.5)
CALCIUM SERPL-MCNC: 7.7 MG/DL — LOW (ref 8.4–10.5)
CALCIUM SERPL-MCNC: 7.9 MG/DL — LOW (ref 8.4–10.5)
CHLORIDE BLDA-SCNC: 104 MMOL/L — SIGNIFICANT CHANGE UP (ref 96–108)
CHLORIDE SERPL-SCNC: 104 MMOL/L — SIGNIFICANT CHANGE UP (ref 98–107)
CHLORIDE SERPL-SCNC: 105 MMOL/L — SIGNIFICANT CHANGE UP (ref 98–107)
CHLORIDE SERPL-SCNC: 110 MMOL/L — HIGH (ref 98–107)
CO2 SERPL-SCNC: 28 MMOL/L — SIGNIFICANT CHANGE UP (ref 22–31)
CO2 SERPL-SCNC: 29 MMOL/L — SIGNIFICANT CHANGE UP (ref 22–31)
CO2 SERPL-SCNC: 29 MMOL/L — SIGNIFICANT CHANGE UP (ref 22–31)
CREAT BLDA-MCNC: 1.24 MG/DL — SIGNIFICANT CHANGE UP (ref 0.5–1.3)
CREAT SERPL-MCNC: 1.22 MG/DL — SIGNIFICANT CHANGE UP (ref 0.5–1.3)
CREAT SERPL-MCNC: 1.44 MG/DL — HIGH (ref 0.5–1.3)
CREAT SERPL-MCNC: 1.45 MG/DL — HIGH (ref 0.5–1.3)
EOSINOPHIL # BLD AUTO: 0 K/UL — SIGNIFICANT CHANGE UP (ref 0–0.5)
EOSINOPHIL NFR BLD AUTO: 0 % — SIGNIFICANT CHANGE UP (ref 0–6)
GLUCOSE BLDA-MCNC: 133 MG/DL — HIGH (ref 70–99)
GLUCOSE SERPL-MCNC: 129 MG/DL — HIGH (ref 70–99)
GLUCOSE SERPL-MCNC: 129 MG/DL — HIGH (ref 70–99)
GLUCOSE SERPL-MCNC: 141 MG/DL — HIGH (ref 70–99)
HCO3 BLDA-SCNC: 29 MMOL/L — HIGH (ref 22–26)
HCO3 BLDA-SCNC: 30 MMOL/L — HIGH (ref 22–26)
HCO3 BLDV-SCNC: 28 MMOL/L — HIGH (ref 20–27)
HCT VFR BLD CALC: 23.4 % — LOW (ref 39–50)
HCT VFR BLD CALC: 25.6 % — LOW (ref 39–50)
HCT VFR BLD CALC: 30.3 % — LOW (ref 39–50)
HCT VFR BLDA CALC: 34.3 % — LOW (ref 39–51)
HGB BLD-MCNC: 10.8 G/DL — LOW (ref 13–17)
HGB BLD-MCNC: 7.9 G/DL — LOW (ref 13–17)
HGB BLD-MCNC: 9 G/DL — LOW (ref 13–17)
HGB BLDA-MCNC: 11.1 G/DL — LOW (ref 13–17)
HIV1 AG SER QL: SIGNIFICANT CHANGE UP
HIV1+2 AB SPEC QL: SIGNIFICANT CHANGE UP
IMM GRANULOCYTES # BLD AUTO: 0.23 # — SIGNIFICANT CHANGE UP
IMM GRANULOCYTES NFR BLD AUTO: 0.8 % — SIGNIFICANT CHANGE UP (ref 0–1.5)
INR BLD: 1.4 — HIGH (ref 0.88–1.17)
LACTATE BLDA-SCNC: 2.2 MMOL/L — HIGH (ref 0.5–2)
LYMPHOCYTES # BLD AUTO: 1.58 K/UL — SIGNIFICANT CHANGE UP (ref 1–3.3)
LYMPHOCYTES # BLD AUTO: 5.5 % — LOW (ref 13–44)
MAGNESIUM SERPL-MCNC: 1.8 MG/DL — SIGNIFICANT CHANGE UP (ref 1.6–2.6)
MAGNESIUM SERPL-MCNC: 1.9 MG/DL — SIGNIFICANT CHANGE UP (ref 1.6–2.6)
MAGNESIUM SERPL-MCNC: 2 MG/DL — SIGNIFICANT CHANGE UP (ref 1.6–2.6)
MCHC RBC-ENTMCNC: 30.9 PG — SIGNIFICANT CHANGE UP (ref 27–34)
MCHC RBC-ENTMCNC: 31.6 PG — SIGNIFICANT CHANGE UP (ref 27–34)
MCHC RBC-ENTMCNC: 31.6 PG — SIGNIFICANT CHANGE UP (ref 27–34)
MCHC RBC-ENTMCNC: 33.8 % — SIGNIFICANT CHANGE UP (ref 32–36)
MCHC RBC-ENTMCNC: 35.2 % — SIGNIFICANT CHANGE UP (ref 32–36)
MCHC RBC-ENTMCNC: 35.6 % — SIGNIFICANT CHANGE UP (ref 32–36)
MCV RBC AUTO: 88.6 FL — SIGNIFICANT CHANGE UP (ref 80–100)
MCV RBC AUTO: 89.8 FL — SIGNIFICANT CHANGE UP (ref 80–100)
MCV RBC AUTO: 91.4 FL — SIGNIFICANT CHANGE UP (ref 80–100)
MONOCYTES # BLD AUTO: 4.21 K/UL — HIGH (ref 0–0.9)
MONOCYTES NFR BLD AUTO: 14.6 % — HIGH (ref 2–14)
NEUTROPHILS # BLD AUTO: 22.81 K/UL — HIGH (ref 1.8–7.4)
NEUTROPHILS NFR BLD AUTO: 79 % — HIGH (ref 43–77)
NRBC # FLD: 0 — SIGNIFICANT CHANGE UP
PCO2 BLDA: 53 MMHG — HIGH (ref 35–48)
PCO2 BLDA: 53 MMHG — HIGH (ref 35–48)
PCO2 BLDV: 59 MMHG — HIGH (ref 41–51)
PH BLDA: 7.39 PH — SIGNIFICANT CHANGE UP (ref 7.35–7.45)
PH BLDA: 7.39 PH — SIGNIFICANT CHANGE UP (ref 7.35–7.45)
PH BLDV: 7.32 PH — SIGNIFICANT CHANGE UP (ref 7.32–7.43)
PHOSPHATE SERPL-MCNC: 1.3 MG/DL — LOW (ref 2.5–4.5)
PHOSPHATE SERPL-MCNC: 1.3 MG/DL — LOW (ref 2.5–4.5)
PLATELET # BLD AUTO: 74 K/UL — LOW (ref 150–400)
PLATELET # BLD AUTO: 79 K/UL — LOW (ref 150–400)
PLATELET # BLD AUTO: 86 K/UL — LOW (ref 150–400)
PMV BLD: 12.8 FL — SIGNIFICANT CHANGE UP (ref 7–13)
PMV BLD: 13.4 FL — HIGH (ref 7–13)
PMV BLD: 13.6 FL — HIGH (ref 7–13)
PO2 BLDA: 109 MMHG — HIGH (ref 83–108)
PO2 BLDA: 177 MMHG — HIGH (ref 83–108)
PO2 BLDV: 76 MMHG — HIGH (ref 35–40)
POTASSIUM BLDA-SCNC: 5.2 MMOL/L — HIGH (ref 3.4–4.5)
POTASSIUM SERPL-MCNC: 4.8 MMOL/L — SIGNIFICANT CHANGE UP (ref 3.5–5.3)
POTASSIUM SERPL-MCNC: 5 MMOL/L — SIGNIFICANT CHANGE UP (ref 3.5–5.3)
POTASSIUM SERPL-MCNC: 5.9 MMOL/L — HIGH (ref 3.5–5.3)
POTASSIUM SERPL-SCNC: 4.8 MMOL/L — SIGNIFICANT CHANGE UP (ref 3.5–5.3)
POTASSIUM SERPL-SCNC: 5 MMOL/L — SIGNIFICANT CHANGE UP (ref 3.5–5.3)
POTASSIUM SERPL-SCNC: 5.9 MMOL/L — HIGH (ref 3.5–5.3)
PROT SERPL-MCNC: 4.8 G/DL — LOW (ref 6–8.3)
PROTHROM AB SERPL-ACNC: 15.8 SEC — HIGH (ref 9.8–13.1)
RBC # BLD: 2.56 M/UL — LOW (ref 4.2–5.8)
RBC # BLD: 2.85 M/UL — LOW (ref 4.2–5.8)
RBC # BLD: 3.42 M/UL — LOW (ref 4.2–5.8)
RBC # FLD: 14.7 % — HIGH (ref 10.3–14.5)
RBC # FLD: 15.1 % — HIGH (ref 10.3–14.5)
RBC # FLD: 15.5 % — HIGH (ref 10.3–14.5)
SAO2 % BLDA: 98.6 % — SIGNIFICANT CHANGE UP (ref 95–99)
SAO2 % BLDA: 99.5 % — HIGH (ref 95–99)
SAO2 % BLDV: 97.5 % — HIGH (ref 60–85)
SODIUM BLDA-SCNC: 136 MMOL/L — SIGNIFICANT CHANGE UP (ref 136–146)
SODIUM SERPL-SCNC: 141 MMOL/L — SIGNIFICANT CHANGE UP (ref 135–145)
SODIUM SERPL-SCNC: 141 MMOL/L — SIGNIFICANT CHANGE UP (ref 135–145)
SODIUM SERPL-SCNC: 151 MMOL/L — HIGH (ref 135–145)
SPECIMEN SOURCE: SIGNIFICANT CHANGE UP
WBC # BLD: 20.79 K/UL — HIGH (ref 3.8–10.5)
WBC # BLD: 25.39 K/UL — HIGH (ref 3.8–10.5)
WBC # BLD: 28.86 K/UL — HIGH (ref 3.8–10.5)
WBC # FLD AUTO: 20.79 K/UL — HIGH (ref 3.8–10.5)
WBC # FLD AUTO: 25.39 K/UL — HIGH (ref 3.8–10.5)
WBC # FLD AUTO: 28.86 K/UL — HIGH (ref 3.8–10.5)

## 2017-09-26 PROCEDURE — 71010: CPT | Mod: 26,76

## 2017-09-26 PROCEDURE — 93306 TTE W/DOPPLER COMPLETE: CPT | Mod: 26

## 2017-09-26 PROCEDURE — 99232 SBSQ HOSP IP/OBS MODERATE 35: CPT

## 2017-09-26 RX ORDER — ASPIRIN/CALCIUM CARB/MAGNESIUM 324 MG
81 TABLET ORAL DAILY
Qty: 0 | Refills: 0 | Status: DISCONTINUED | OUTPATIENT
Start: 2017-09-27 | End: 2017-10-25

## 2017-09-26 RX ORDER — SODIUM CHLORIDE 9 MG/ML
250 INJECTION INTRAMUSCULAR; INTRAVENOUS; SUBCUTANEOUS ONCE
Qty: 0 | Refills: 0 | Status: COMPLETED | OUTPATIENT
Start: 2017-09-26 | End: 2017-09-26

## 2017-09-26 RX ORDER — FUROSEMIDE 40 MG
5 TABLET ORAL
Qty: 500 | Refills: 0 | Status: DISCONTINUED | OUTPATIENT
Start: 2017-09-26 | End: 2017-09-27

## 2017-09-26 RX ORDER — ALBUMIN HUMAN 25 %
50 VIAL (ML) INTRAVENOUS EVERY 8 HOURS
Qty: 0 | Refills: 0 | Status: DISCONTINUED | OUTPATIENT
Start: 2017-09-26 | End: 2017-09-26

## 2017-09-26 RX ORDER — VALPROIC ACID (AS SODIUM SALT) 250 MG/5ML
500 SOLUTION, ORAL ORAL
Qty: 0 | Refills: 0 | Status: DISCONTINUED | OUTPATIENT
Start: 2017-09-26 | End: 2017-09-27

## 2017-09-26 RX ORDER — HEPARIN SODIUM 5000 [USP'U]/ML
5000 INJECTION INTRAVENOUS; SUBCUTANEOUS EVERY 8 HOURS
Qty: 0 | Refills: 0 | Status: DISCONTINUED | OUTPATIENT
Start: 2017-09-27 | End: 2017-10-01

## 2017-09-26 RX ORDER — FUROSEMIDE 40 MG
40 TABLET ORAL EVERY 8 HOURS
Qty: 0 | Refills: 0 | Status: DISCONTINUED | OUTPATIENT
Start: 2017-09-26 | End: 2017-09-26

## 2017-09-26 RX ORDER — PANTOPRAZOLE SODIUM 20 MG/1
40 TABLET, DELAYED RELEASE ORAL EVERY 12 HOURS
Qty: 0 | Refills: 0 | Status: DISCONTINUED | OUTPATIENT
Start: 2017-09-26 | End: 2017-10-01

## 2017-09-26 RX ORDER — ACETAMINOPHEN 500 MG
1000 TABLET ORAL ONCE
Qty: 0 | Refills: 0 | Status: COMPLETED | OUTPATIENT
Start: 2017-09-26 | End: 2017-09-26

## 2017-09-26 RX ORDER — FENTANYL CITRATE 50 UG/ML
25 INJECTION INTRAVENOUS
Qty: 0 | Refills: 0 | Status: DISCONTINUED | OUTPATIENT
Start: 2017-09-26 | End: 2017-09-30

## 2017-09-26 RX ORDER — ALBUMIN HUMAN 25 %
250 VIAL (ML) INTRAVENOUS ONCE
Qty: 0 | Refills: 0 | Status: COMPLETED | OUTPATIENT
Start: 2017-09-26 | End: 2017-09-26

## 2017-09-26 RX ORDER — FUROSEMIDE 40 MG
10 TABLET ORAL EVERY 8 HOURS
Qty: 0 | Refills: 0 | Status: DISCONTINUED | OUTPATIENT
Start: 2017-09-26 | End: 2017-09-26

## 2017-09-26 RX ORDER — FUROSEMIDE 40 MG
10 TABLET ORAL ONCE
Qty: 0 | Refills: 0 | Status: COMPLETED | OUTPATIENT
Start: 2017-09-26 | End: 2017-09-26

## 2017-09-26 RX ADMIN — Medication 27.28 MICROGRAM(S)/KG/MIN: at 08:30

## 2017-09-26 RX ADMIN — PIPERACILLIN AND TAZOBACTAM 25 GRAM(S): 4; .5 INJECTION, POWDER, LYOPHILIZED, FOR SOLUTION INTRAVENOUS at 14:35

## 2017-09-26 RX ADMIN — FENTANYL CITRATE 25 MICROGRAM(S): 50 INJECTION INTRAVENOUS at 16:15

## 2017-09-26 RX ADMIN — POLYETHYLENE GLYCOL 3350 17 GRAM(S): 17 POWDER, FOR SOLUTION ORAL at 22:58

## 2017-09-26 RX ADMIN — FENTANYL CITRATE 25 MICROGRAM(S): 50 INJECTION INTRAVENOUS at 15:58

## 2017-09-26 RX ADMIN — PHENYLEPHRINE HYDROCHLORIDE 36.38 MICROGRAM(S)/KG/MIN: 10 INJECTION INTRAVENOUS at 00:11

## 2017-09-26 RX ADMIN — Medication 500 MILLIGRAM(S): at 05:40

## 2017-09-26 RX ADMIN — VORICONAZOLE 200 MILLIGRAM(S): 10 INJECTION, POWDER, LYOPHILIZED, FOR SOLUTION INTRAVENOUS at 05:40

## 2017-09-26 RX ADMIN — SODIUM CHLORIDE 10 MILLILITER(S): 9 INJECTION INTRAMUSCULAR; INTRAVENOUS; SUBCUTANEOUS at 22:34

## 2017-09-26 RX ADMIN — PROPOFOL 11.64 MICROGRAM(S)/KG/MIN: 10 INJECTION, EMULSION INTRAVENOUS at 22:33

## 2017-09-26 RX ADMIN — SODIUM CHLORIDE 30 MILLILITER(S): 9 INJECTION INTRAMUSCULAR; INTRAVENOUS; SUBCUTANEOUS at 14:36

## 2017-09-26 RX ADMIN — SODIUM CHLORIDE 30 MILLILITER(S): 9 INJECTION INTRAMUSCULAR; INTRAVENOUS; SUBCUTANEOUS at 00:12

## 2017-09-26 RX ADMIN — FENTANYL CITRATE 50 MICROGRAM(S): 50 INJECTION INTRAVENOUS at 00:15

## 2017-09-26 RX ADMIN — Medication 125 MILLILITER(S): at 04:25

## 2017-09-26 RX ADMIN — Medication 1 PUFF(S): at 09:38

## 2017-09-26 RX ADMIN — Medication 1 PUFF(S): at 22:57

## 2017-09-26 RX ADMIN — Medication 5 MG/HR: at 22:32

## 2017-09-26 RX ADMIN — Medication 10 MILLIGRAM(S): at 08:23

## 2017-09-26 RX ADMIN — PROPOFOL 11.64 MICROGRAM(S)/KG/MIN: 10 INJECTION, EMULSION INTRAVENOUS at 08:30

## 2017-09-26 RX ADMIN — Medication 1 PUFF(S): at 03:26

## 2017-09-26 RX ADMIN — PIPERACILLIN AND TAZOBACTAM 25 GRAM(S): 4; .5 INJECTION, POWDER, LYOPHILIZED, FOR SOLUTION INTRAVENOUS at 05:40

## 2017-09-26 RX ADMIN — PHENYLEPHRINE HYDROCHLORIDE 36.38 MICROGRAM(S)/KG/MIN: 10 INJECTION INTRAVENOUS at 22:33

## 2017-09-26 RX ADMIN — Medication 50 MILLILITER(S): at 13:23

## 2017-09-26 RX ADMIN — PROPOFOL 11.64 MICROGRAM(S)/KG/MIN: 10 INJECTION, EMULSION INTRAVENOUS at 00:12

## 2017-09-26 RX ADMIN — Medication 10 MILLIGRAM(S): at 14:35

## 2017-09-26 RX ADMIN — PANTOPRAZOLE SODIUM 40 MILLIGRAM(S): 20 TABLET, DELAYED RELEASE ORAL at 18:56

## 2017-09-26 RX ADMIN — Medication 20 MILLIGRAM(S): at 14:35

## 2017-09-26 RX ADMIN — ALBUTEROL 2 PUFF(S): 90 AEROSOL, METERED ORAL at 16:37

## 2017-09-26 RX ADMIN — FENTANYL CITRATE 50 MICROGRAM(S): 50 INJECTION INTRAVENOUS at 00:00

## 2017-09-26 RX ADMIN — PHENYLEPHRINE HYDROCHLORIDE 36.38 MICROGRAM(S)/KG/MIN: 10 INJECTION INTRAVENOUS at 08:30

## 2017-09-26 RX ADMIN — Medication 27.28 MICROGRAM(S)/KG/MIN: at 22:33

## 2017-09-26 RX ADMIN — Medication 1 PUFF(S): at 16:56

## 2017-09-26 RX ADMIN — PIPERACILLIN AND TAZOBACTAM 25 GRAM(S): 4; .5 INJECTION, POWDER, LYOPHILIZED, FOR SOLUTION INTRAVENOUS at 22:58

## 2017-09-26 RX ADMIN — ALBUTEROL 2 PUFF(S): 90 AEROSOL, METERED ORAL at 09:38

## 2017-09-26 RX ADMIN — Medication 40 MILLIGRAM(S): at 18:31

## 2017-09-26 RX ADMIN — Medication 400 MILLIGRAM(S): at 03:05

## 2017-09-26 RX ADMIN — VORICONAZOLE 200 MILLIGRAM(S): 10 INJECTION, POWDER, LYOPHILIZED, FOR SOLUTION INTRAVENOUS at 18:30

## 2017-09-26 RX ADMIN — Medication 500 MILLIGRAM(S): at 18:56

## 2017-09-26 RX ADMIN — Medication 27.28 MICROGRAM(S)/KG/MIN: at 00:12

## 2017-09-26 RX ADMIN — ALBUTEROL 2 PUFF(S): 90 AEROSOL, METERED ORAL at 22:57

## 2017-09-26 RX ADMIN — CHLORHEXIDINE GLUCONATE 1 APPLICATION(S): 213 SOLUTION TOPICAL at 00:00

## 2017-09-26 RX ADMIN — Medication 63.75 MILLIMOLE(S): at 22:33

## 2017-09-26 RX ADMIN — ALBUTEROL 2 PUFF(S): 90 AEROSOL, METERED ORAL at 03:26

## 2017-09-26 RX ADMIN — ATORVASTATIN CALCIUM 80 MILLIGRAM(S): 80 TABLET, FILM COATED ORAL at 22:57

## 2017-09-26 RX ADMIN — Medication 400 MILLIGRAM(S): at 13:23

## 2017-09-26 RX ADMIN — SODIUM CHLORIDE 1000 MILLILITER(S): 9 INJECTION INTRAMUSCULAR; INTRAVENOUS; SUBCUTANEOUS at 06:20

## 2017-09-26 NOTE — PROGRESS NOTE ADULT - ASSESSMENT
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  On Zosyn 9/12 --> 17; 9/23 --> and Voriconazole 9/24    Suggest: Change po to IV Voriconazole while on pressor support  Consider change from Zosyn to Meropenem 2 gms IVSS every 8 hours if fevers persist

## 2017-09-26 NOTE — PROGRESS NOTE ADULT - SUBJECTIVE AND OBJECTIVE BOX
Follow Up:      Interval History/ROS: sedated on vent; girlfriend/health care proxy at bedside    Allergies  No Known Allergies    ANTIMICROBIALS:  piperacillin/tazobactam IVPB. 3.375 every 8 hours  voriconazole 200 every 12 hours    OTHER MEDS:  MEDICATIONS  (STANDING):  atorvastatin 80 at bedtime  acetaminophen    Suspension 650 every 6 hours PRN  aspirin  chewable 81 daily  polyethylene glycol 3350 17 at bedtime  aluminum hydroxide/magnesium hydroxide/simethicone Suspension 30 every 6 hours PRN  acetaminophen  IVPB. 1000 once PRN  heparin  Injectable 5000 every 8 hours  docusate sodium 100 two times a day  phenylephrine    Infusion 2 <Continuous>  propofol Infusion 20 <Continuous>  ALBUTerol    90 MICROgram(s) HFA Inhaler 2 every 6 hours  ipratropium 17 MICROgram(s) HFA Inhaler 1 every 6 hours  norepinephrine Infusion 0.15 <Continuous>  valproic  acid Syrup 500 two times a day  pantoprazole  Injectable 40 every 12 hours  furosemide   Injectable 10 every 8 hours      Vital Signs Last 24 Hrs  T(C): 37.9 (26 Sep 2017 08:00), Max: 38.6 (26 Sep 2017 03:00)  T(F): 100.3 (26 Sep 2017 08:00), Max: 101.4 (26 Sep 2017 03:00)  HR: 122 (26 Sep 2017 09:00) (75 - 150)  BP: 98/68 (26 Sep 2017 04:00) (87/61 - 151/100)  BP(mean): 75 (26 Sep 2017 04:00) (63 - 113)  RR: 18 (26 Sep 2017 09:00) (14 - 22)  SpO2: 99% (26 Sep 2017 09:00) (95% - 100%)    PHYSICAL EXAM:  General: WN/WD sedated on Vaent - oral ET, pressor support  Neurology: sedated  Respiratory: Clear to auscultation bilaterally; chest tube x 2 to drainage: serous sanguinous drainage  CV: RRR, S1S2, no murmurs, rubs or gallops  Abdominal: Soft, Non-tender, non-distended, normal bowel sounds  Extremities: trace LE edema  Line Sites: Clear  Skin: No rash                10.8   28.86 )-----------( 74       ( 26 Sep 2017 03:00 )             30.3       09-26    141  |  105  |  17  ----------------------------<  141<H>  5.9<H>   |  28  |  1.22    Ca    7.9<L>      26 Sep 2017 03:00  Phos  3.6     09-24  Mg     1.8     09-26    TPro  4.8<L>  /  Alb  2.7<L>  /  TBili  1.3<H>  /  DBili  x   /  AST  43<H>  /  ALT  19  /  AlkPhos  31<L>  09-26      MICROBIOLOGY:  Culture - Acid Fast Smear Concentrated (09.24.17 @ 15:02)    Specimen Source: OTHER    Culture - Acid Fast Smear Concentrated:   AFB SMEAR= NO ACID FAST BACILLI SEEN    Culture - Respiratory with Gram Stain (09.24.17 @ 15:02)    Gram Stain Sputum:   NOS^No Organisms Seen  WBC^White Blood Cells  QNTY CELLS IN GRAM STAIN: FEW (2+)    Culture - Respiratory:   CULTURE IN PROGRESS, FURTHER REPORT TO FOLLOW.    Specimen Source: OTHER    Culture - Yeast and Fungus (09.21.17 @ 18:21)    Culture - Yeast and Fungus:   CULTURE NEGATIVE FOR YEASTS AND MOLDS   AFTER 3 DAYS    Specimen Source: BRONCHIAL LAVAGE    Culture - Acid Fast Smear Concentrated (09.21.17 @ 18:21)    Specimen Source: BRONCHIAL LAVAGE    Culture - Acid Fast Smear Concentrated:   AFB SMEAR= NO ACID FAST BACILLI SEEN    Culture - Respiratory with Gram Stain (09.21.17 @ 18:21)    Gram Stain Sputum:   WBC^White Blood Cells  QUANTITY OF BACTERIA SEEN: MODERATE (3+)  NOS^No Organisms Seen    Culture - Respiratory:   NO GROWTH - PRELIMINARY RESULTS  NRF^Normal Respiratory Katherine  QUANTITY OF GROWTH: FEW    Specimen Source: BRONCHIAL LAVAGE      RADIOLOGY:  < from: Xray Chest 1 View AP -PORTABLE-Routine (09.26.17 @ 06:40) >  IMPRESSION:  Tip of enteric tube location is uncertain. The tube tip is   in the distal esophagus on the prior image.    Tip of left IJ line remains coiled in the brachiocephalic vein.    Stable small left apical pneumothorax.    ET tube and left chest tubes as above.    No significant interval change in bilateral lung opacities and small   pleural effusions.    < end of copied text >    Samson Preciado MD; Division of Infectious Disease; Pager: [phone]; nights and weekends: [phone]

## 2017-09-26 NOTE — PROGRESS NOTE ADULT - PROBLEM SELECTOR PLAN 1
S/P surgery : reop twice secondary to hemothroax: curently on pressors and ventilator support: He is wheezing extensively: started on IV steroids 20 mg twice a day , but should be given only for few days : follow his wheezing daily as soon as it is resolved; dc steroids For now their is no hemoptysis: He had twice reop yesterday secondary to hemothorax and bleeding from chest tube: currently on pressors and seated and on broad spectrum antibiotics.

## 2017-09-26 NOTE — PROGRESS NOTE ADULT - SUBJECTIVE AND OBJECTIVE BOX
47 M PMH of sarcoidosis, HTN, COPD, and asthma who was initially admitted on 09/10 w cc of severe leg spasms X two days  In E.D. pt found to be wheezing, so he was seen by Pulmonary and started on IV Steroids for sarcoidosis  On 9/12 at 4 am, a RRT and Stroke codes were called as the patient became unresponsive.  He was transferred to MICU and then intubated for airway protection.   The patient was found to have a new infarct on MRI and also found to have a patent PFO on this admission as well. He was weaned off vent on 09/13 and transferred to the floors   There was concern patient was having paroxysmal afib, so he was started on Eliquis yesterday evening. He has received 2 doses of Eliquis on 09/19.   However, on 09/20, the patient had a coughing episode, which was not unusual for him, but during this episode he began to cough up fresh blood, about 1/2 cup of blood. He underwent Bronchoscopy with bronchoalveolar lavage of both sides and epi injection on 09/21 and was to have received further IR embolization for hemoptysis control in the next few days.  However, he developed hemoptysis again and underwent urgent LULectomy, complicated with post op bleeding.  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              Sarcoidosis on Prednisone              PFO              Pulm edema, possible TRALI              s/p massive transfusion     Drips:  Adair 2 mcg/min             Levo 0.16 mcg/kg/min             Propofol         Allergies:  	No Known Allergies:     Home Medications:   * Incomplete Medication History as of 11-Sep-2017 09:23 documented in Structured Notes  · 	Azithromycin 5 Day Dose Pack 250 mg oral tablet: 1 dose(s) orally once a day for respiratory infection, Last Dose Taken:    · 	predniSONE 50 mg oral tablet: 1 tab(s) orally once a day , Last Dose Taken:    · 	albuterol 1.25 mg/3 mL (0.042%) inhalation solution: 3 milliliter(s) inhaled 3 times a day, Last Dose Taken:    · 	losartan 25 mg oral tablet: 1 tab(s) orally 2 times a day, Last Dose Taken:    · 	Symbicort 160 mcg-4.5 mcg/inh inhalation aerosol: 2 puff(s) inhaled 2 times a day, Last Dose Taken:    · 	Incruse Ellipta: 1 puff(s) inhaled once a day, Last Dose Taken:      MEDICATIONS  (STANDING):  atorvastatin 80 milliGRAM(s) Oral at bedtime  chlorhexidine 4% Liquid 1 Application(s) Topical daily  aspirin  chewable 81 milliGRAM(s) Oral daily  polyethylene glycol 3350 17 Gram(s) Oral at bedtime  piperacillin/tazobactam IVPB. 3.375 Gram(s) IV Intermittent every 8 hours  heparin  Injectable 5000 Unit(s) SubCutaneous every 8 hours  voriconazole 200 milliGRAM(s) Oral every 12 hours  sodium chloride 0.9%. 1000 milliLiter(s) (30 mL/Hr) IV Continuous <Continuous>  docusate sodium 100 milliGRAM(s) Oral two times a day  phenylephrine    Infusion 2 MICROgram(s)/kG/Min (36.375 mL/Hr) IV Continuous <Continuous>  propofol Infusion 20 MICROgram(s)/kG/Min (11.64 mL/Hr) IV Continuous <Continuous>  ALBUTerol    90 MICROgram(s) HFA Inhaler 2 Puff(s) Inhalation every 6 hours  ipratropium 17 MICROgram(s) HFA Inhaler 1 Puff(s) Inhalation every 6 hours  norepinephrine Infusion 0.15 MICROgram(s)/kG/Min (27.281 mL/Hr) IV Continuous <Continuous>  valproic  acid Syrup 500 milliGRAM(s) Oral two times a day  pantoprazole  Injectable 40 milliGRAM(s) IV Push every 12 hours  furosemide   Injectable 10 milliGRAM(s) IV Push every 8 hours  albumin human 25% IVPB 50 milliLiter(s) IV Intermittent every 8 hours    MEDICATIONS  (PRN):  acetaminophen    Suspension 650 milliGRAM(s) Oral every 6 hours PRN For Temp greater than 38 C (100.4 F)  aluminum hydroxide/magnesium hydroxide/simethicone Suspension 30 milliLiter(s) Oral every 6 hours PRN Dyspepsia  acetaminophen  IVPB. 1000 milliGRAM(s) IV Intermittent once PRN Moderate Pain (4 - 6)      ICU Vital Signs Last 24 Hrs  T(C): 37.9 (26 Sep 2017 08:00), Max: 38.6 (26 Sep 2017 03:00)  T(F): 100.3 (26 Sep 2017 08:00), Max: 101.4 (26 Sep 2017 03:00)  HR: 124 (26 Sep 2017 09:40) (75 - 150)  BP: 98/68 (26 Sep 2017 04:00) (87/61 - 151/100)  BP(mean): 75 (26 Sep 2017 04:00) (63 - 113)  ABP: 93/59 (26 Sep 2017 09:00) (59/32 - 148/91)  ABP(mean): 72 (26 Sep 2017 09:00) (41 - 113)  RR: 18 (26 Sep 2017 09:00) (14 - 22)  SpO2: 100% (26 Sep 2017 09:40) (95% - 100%)      Physical exam:                           General:            Heavily sedated  Neuro:              Passive Movement all extremities 	  Respiratory:	Good b/L aeration. Posituve, rhonchi.   CV:		Regular rate and rhythm. Normal S1/S2. No murmurs Distal pulses present, but weak.  Abdomen:	flat, Soft, non-distended. Bowel sounds are hypoactive  Skin:		No rash.  Extremities:	Warm, no cyanosis or edema.      I&O's Summary    25 Sep 2017 07:01  -  26 Sep 2017 07:00  --------------------------------------------------------  IN: 5460.5 mL / OUT: 3815 mL / NET: 1645.5 mL    26 Sep 2017 07:01  -  26 Sep 2017 10:51  --------------------------------------------------------  IN: 0 mL / OUT: 75 mL / NET: -75 mL    Labs:                                                                         10.8   28.86 )-----------( 74       ( 26 Sep 2017 03:00 )             30.3                       09-26    141  |  105  |  17  ----------------------------<  141<H>  5.9<H>   |  28  |  1.22    Ca    7.9<L>      26 Sep 2017 03:00  Phos  3.6     09-24  Mg     1.8     09-26    TPro  4.8<L>  /  Alb  2.7<L>  /  TBili  1.3<H>  /  DBili  x   /  AST  43<H>  /  ALT  19  /  AlkPhos  31<L>  09-26    LIVER FUNCTIONS - ( 26 Sep 2017 03:00 )  Alb: 2.7 g/dL / Pro: 4.8 g/dL / ALK PHOS: 31 u/L / ALT: 19 u/L / AST: 43 u/L / GGT: x                                PT/INR - ( 26 Sep 2017 03:00 )   PT: 15.8 SEC;   INR: 1.40      PTT - ( 26 Sep 2017 03:00 )  PTT:26.7 SEC      Mode: AC/ CMV (Assist Control/ Continuous Mandatory Ventilation)  RR (machine): 16  TV (machine): 450  FiO2: 40  PEEP: 5  MAP: 12  PIP: 39        ABG - ( 26 Sep 2017 03:00 )  pH: 7.39  /  pCO2: 53    /  pO2: 177   / HCO3: 29    / Base Excess: 5.9   /  SaO2: 99.5      CXR: Volume overloaded w density DAVID. TLC twisted in brachia vein    Plan:  47 M PMH of sarcoidosis, HTN, COPD, and asthma who was initially admitted on 09/10 w cc of severe leg spasms X two days  In E.D. pt found to be wheezing, so he was seen by Pulmonary and started on IV Steroids for sarcoidosis  On 9/12 at 4 am, a RRT and Stroke codes were called as the patient became unresponsive.  He was transferred to MICU and then intubated for airway protection.   The patient was found to have a new infarct on MRI and also found to have a patent PFO on this admission as well. He was weaned off vent on 09/13 and transferred to the floors   There was concern patient was having paroxysmal afib, so he was started on Eliquis yesterday evening. He has received 2 doses of Eliquis on 09/19.   However, on 09/20, the patient had a coughing episode, which was not unusual for him, but during this episode he began to cough up fresh blood, about 1/2 cup of blood. He underwent Bronchoscopy with bronchoalveolar lavage of both sides and epi injection on 09/21 and was to have received further IR embolization for hemoptysis control in the next few days.  However, he developed hemoptysis again and underwent urgent LULectomy, complicated with post op bleeding.  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              Sarcoidosis on Prednisone              PFO              Pulm edema, possible TRALI              s/p massive transfusion     ====================== NEUROLOGY=====================  Pain control with PRN Fentanyl  Patient comfortable, on propofol    ==================== RESPIRATORY======================  Post- op  Resp failure, Pulm edema, possible TRALI  Diurese aggressivley blood pressure permitting  Cannot Wean or Extubate   Sarcoidosis: Continue low dose steroids  Aspergilloma: Continue voriconazole   Monitor chest tube output  Chest tube to suction   Continue bronchodilators, pulmonary toilet    ====================CARDIOVASCULAR==================  Continue hemodynamic monitoring. On Adair and Levo moderate doses  Continue cardiovascular / antihypertensive medications  Titrate Levo/ ADAIR for SBP> 100    ===================== RENAL =========================  Continue IVF  Monitor I/Os and electrolytes        ==================== GASTROINTESTINAL===================  Continue GI prophylaxis with Protonix  Continue Zofran / Reglan for nausea - PRN	    =======================    ENDOCRIN  =====================  Glycemic monitoring  F/S with coverage    ===================HEMATOLOGIC/ONC ===================  Monitor chest tube output. No signs of active bleeding at this time   Follow CBC in AM  DVT prophylaxis with SCD,     ========================INFECTIOUS DISEASE================  Aspergilloma: Continue voriconazole   All surgical incision / chest tube  sites look clean  D/C [place] in AM      All available pertinent clinical, laboratory, radiographic, hemodynamic, echocardiographic, respiratory data, microbiologic data and chart were reviewed and analyzed frequently throughout the course of the day and night. GI and DVT prophylaxis, glycemic control, head of bed elevation and skin care issues were addressed.  Patient seen, examined and plan discussed with CT Surgery / CTICU team during rounds.    I have spent 90 minutes of critical care time with this pt. between 7am and 1159pam.    Prosper Wen MD

## 2017-09-26 NOTE — PROGRESS NOTE ADULT - PROBLEM SELECTOR PLAN 6
on steroids at this time: it was given only for a short duration: thinking his wheezing is secondary to rhinovirus infection: It will cont till tomorrow: thereafter would taper it down to off in next 3-4

## 2017-09-26 NOTE — PROGRESS NOTE ADULT - SUBJECTIVE AND OBJECTIVE BOX
POST ANESTHESIA EVALUATION    47y Male POSTOP DAY 1 S/P thoracotomy & hemothorax evac x2    MENTAL STATUS: Patient participation [  ] Awake     [  ] Arousable     [ X ] Sedated    AIRWAY PATENCY: [ X ] Satisfactory  [  ] Other:     Vital Signs Last 24 Hrs  T(C): 37.9 (26 Sep 2017 08:00), Max: 38.6 (26 Sep 2017 03:00)  T(F): 100.3 (26 Sep 2017 08:00), Max: 101.4 (26 Sep 2017 03:00)  HR: 122 (26 Sep 2017 09:00) (75 - 150)  BP: 98/68 (26 Sep 2017 04:00) (87/61 - 151/100)  BP(mean): 75 (26 Sep 2017 04:00) (63 - 113)  RR: 18 (26 Sep 2017 09:00) (14 - 22)  SpO2: 99% (26 Sep 2017 09:00) (95% - 100%)  I&O's Summary    25 Sep 2017 07:01  -  26 Sep 2017 07:00  --------------------------------------------------------  IN: 5460.5 mL / OUT: 3815 mL / NET: 1645.5 mL    26 Sep 2017 07:01  -  26 Sep 2017 09:30  --------------------------------------------------------  IN: 0 mL / OUT: 75 mL / NET: -75 mL          NAUSEA/ VOMITTING:  [ X ] NONE  [  ] CONTROLLED [  ] OTHER     PAIN: [ X ] CONTROLLED WITH CURRENT REGIMEN  [  ] OTHER    [  X] NO APPARENT ANESTHESIA COMPLICATIONS      Comments: POST ANESTHESIA EVALUATION    47y Male POSTOP DAY 1 S/P thoracotomy & hemothorax evac x2    MENTAL STATUS: Patient participation [  ] Awake     [  ] Arousable     [ X ] Sedated    AIRWAY PATENCY: [  ] Satisfactory  [ X ] Other: intubated    Vital Signs Last 24 Hrs  T(C): 37.9 (26 Sep 2017 08:00), Max: 38.6 (26 Sep 2017 03:00)  T(F): 100.3 (26 Sep 2017 08:00), Max: 101.4 (26 Sep 2017 03:00)  HR: 122 (26 Sep 2017 09:00) (75 - 150)  BP: 98/68 (26 Sep 2017 04:00) (87/61 - 151/100)  BP(mean): 75 (26 Sep 2017 04:00) (63 - 113)  RR: 18 (26 Sep 2017 09:00) (14 - 22)  SpO2: 99% (26 Sep 2017 09:00) (95% - 100%)  I&O's Summary    25 Sep 2017 07:01  -  26 Sep 2017 07:00  --------------------------------------------------------  IN: 5460.5 mL / OUT: 3815 mL / NET: 1645.5 mL    26 Sep 2017 07:01  -  26 Sep 2017 09:30  --------------------------------------------------------  IN: 0 mL / OUT: 75 mL / NET: -75 mL          NAUSEA/ VOMITTING:  [ X ] NONE  [  ] CONTROLLED [  ] OTHER     PAIN: [ X ] CONTROLLED WITH CURRENT REGIMEN  [  ] OTHER    [  X] NO APPARENT ANESTHESIA COMPLICATIONS      Comments:

## 2017-09-26 NOTE — PROGRESS NOTE ADULT - SUBJECTIVE AND OBJECTIVE BOX
Anesthesia Pain Management Service- Attending Addendum    SUBJECTIVE: Patient's sedated, intubated     Therapy:	  [ X] IV PCA	   [ ] Epidural           [ ] s/p Spinal Opoid              [ ] Postpartum infusion	  [ ] Patient controlled regional anesthesia (PCRA)    [ ] prn Analgesics    Allergies    No Known Allergies    Intolerances      MEDICATIONS  (STANDING):  atorvastatin 80 milliGRAM(s) Oral at bedtime  chlorhexidine 4% Liquid 1 Application(s) Topical daily  polyethylene glycol 3350 17 Gram(s) Oral at bedtime  piperacillin/tazobactam IVPB. 3.375 Gram(s) IV Intermittent every 8 hours  voriconazole 200 milliGRAM(s) Oral every 12 hours  sodium chloride 0.9%. 1000 milliLiter(s) (30 mL/Hr) IV Continuous <Continuous>  docusate sodium 100 milliGRAM(s) Oral two times a day  phenylephrine    Infusion 2 MICROgram(s)/kG/Min (36.375 mL/Hr) IV Continuous <Continuous>  propofol Infusion 20 MICROgram(s)/kG/Min (11.64 mL/Hr) IV Continuous <Continuous>  ALBUTerol    90 MICROgram(s) HFA Inhaler 2 Puff(s) Inhalation every 6 hours  ipratropium 17 MICROgram(s) HFA Inhaler 1 Puff(s) Inhalation every 6 hours  norepinephrine Infusion 0.15 MICROgram(s)/kG/Min (27.281 mL/Hr) IV Continuous <Continuous>  valproic  acid Syrup 500 milliGRAM(s) Oral two times a day  pantoprazole  Injectable 40 milliGRAM(s) IV Push every 12 hours  furosemide   Injectable 10 milliGRAM(s) IV Push every 8 hours  albumin human 25% IVPB 50 milliLiter(s) IV Intermittent every 8 hours  methylPREDNISolone sodium succinate Injectable 20 milliGRAM(s) IV Push two times a day    MEDICATIONS  (PRN):  acetaminophen    Suspension 650 milliGRAM(s) Oral every 6 hours PRN For Temp greater than 38 C (100.4 F)  aluminum hydroxide/magnesium hydroxide/simethicone Suspension 30 milliLiter(s) Oral every 6 hours PRN Dyspepsia  acetaminophen  IVPB. 1000 milliGRAM(s) IV Intermittent once PRN Moderate Pain (4 - 6)  fentaNYL    Injectable 25 MICROGram(s) IV Push every 3 hours PRN Moderate Pain (4 - 6)      OBJECTIVE:   [X] No new signs     [ ] Other:    Side Effects:  [X ] None			[ ] Other:      ASSESSMENT/PLAN  -Discontinue current therapy    [ ] Therapy changed to:    [ ] IV PCA       [ ] Epidural     [ X] prn Analgesics     Comments: Pain management per primary team, APS to sign off

## 2017-09-27 ENCOUNTER — TRANSCRIPTION ENCOUNTER (OUTPATIENT)
Age: 47
End: 2017-09-27

## 2017-09-27 LAB
BACTERIA SPT RESP CULT: SIGNIFICANT CHANGE UP
BASE EXCESS BLDA CALC-SCNC: 10.6 MMOL/L — SIGNIFICANT CHANGE UP
BASE EXCESS BLDA CALC-SCNC: 11.6 MMOL/L — SIGNIFICANT CHANGE UP
BASE EXCESS BLDA CALC-SCNC: 12.8 MMOL/L — SIGNIFICANT CHANGE UP
BLD GP AB SCN SERPL QL: NEGATIVE — SIGNIFICANT CHANGE UP
BUN SERPL-MCNC: 15 MG/DL — SIGNIFICANT CHANGE UP (ref 7–23)
CALCIUM SERPL-MCNC: 7.9 MG/DL — LOW (ref 8.4–10.5)
CHLORIDE BLDA-SCNC: 99 MMOL/L — SIGNIFICANT CHANGE UP (ref 96–108)
CHLORIDE SERPL-SCNC: 100 MMOL/L — SIGNIFICANT CHANGE UP (ref 98–107)
CO2 SERPL-SCNC: 31 MMOL/L — SIGNIFICANT CHANGE UP (ref 22–31)
CREAT BLDA-MCNC: 1.41 MG/DL — HIGH (ref 0.5–1.3)
CREAT SERPL-MCNC: 1.31 MG/DL — HIGH (ref 0.5–1.3)
GLUCOSE BLDA-MCNC: 151 MG/DL — HIGH (ref 70–99)
GLUCOSE BLDA-MCNC: 158 MG/DL — HIGH (ref 70–99)
GLUCOSE SERPL-MCNC: 152 MG/DL — HIGH (ref 70–99)
HAV IGM SER-ACNC: NONREACTIVE — SIGNIFICANT CHANGE UP
HBV CORE AB SER-ACNC: NONREACTIVE — SIGNIFICANT CHANGE UP
HBV CORE IGM SER-ACNC: NONREACTIVE — SIGNIFICANT CHANGE UP
HBV SURFACE AB SER-ACNC: NONREACTIVE — SIGNIFICANT CHANGE UP
HBV SURFACE AG SER-ACNC: NONREACTIVE — SIGNIFICANT CHANGE UP
HBV SURFACE AG SER-ACNC: NONREACTIVE — SIGNIFICANT CHANGE UP
HCO3 BLDA-SCNC: 34 MMOL/L — HIGH (ref 22–26)
HCO3 BLDA-SCNC: 35 MMOL/L — HIGH (ref 22–26)
HCO3 BLDA-SCNC: 36 MMOL/L — HIGH (ref 22–26)
HCT VFR BLD CALC: 23.5 % — LOW (ref 39–50)
HCT VFR BLDA CALC: 24.6 % — LOW (ref 39–51)
HCT VFR BLDA CALC: 26.2 % — LOW (ref 39–51)
HCV AB S/CO SERPL IA: 0.14 S/CO — SIGNIFICANT CHANGE UP
HCV AB S/CO SERPL IA: 0.15 S/CO — SIGNIFICANT CHANGE UP
HCV AB SERPL-IMP: SIGNIFICANT CHANGE UP
HCV AB SERPL-IMP: SIGNIFICANT CHANGE UP
HGB BLD-MCNC: 8.1 G/DL — LOW (ref 13–17)
HGB BLDA-MCNC: 7.9 G/DL — LOW (ref 13–17)
HGB BLDA-MCNC: 8.4 G/DL — LOW (ref 13–17)
LACTATE BLDA-SCNC: 1.3 MMOL/L — SIGNIFICANT CHANGE UP (ref 0.5–2)
MAGNESIUM SERPL-MCNC: 2.1 MG/DL — SIGNIFICANT CHANGE UP (ref 1.6–2.6)
MCHC RBC-ENTMCNC: 31.2 PG — SIGNIFICANT CHANGE UP (ref 27–34)
MCHC RBC-ENTMCNC: 34.5 % — SIGNIFICANT CHANGE UP (ref 32–36)
MCV RBC AUTO: 90.4 FL — SIGNIFICANT CHANGE UP (ref 80–100)
NRBC # FLD: 0 — SIGNIFICANT CHANGE UP
PCO2 BLDA: 49 MMHG — HIGH (ref 35–48)
PCO2 BLDA: 53 MMHG — HIGH (ref 35–48)
PCO2 BLDA: 56 MMHG — HIGH (ref 35–48)
PH BLDA: 7.44 PH — SIGNIFICANT CHANGE UP (ref 7.35–7.45)
PH BLDA: 7.45 PH — SIGNIFICANT CHANGE UP (ref 7.35–7.45)
PH BLDA: 7.47 PH — HIGH (ref 7.35–7.45)
PHOSPHATE SERPL-MCNC: 2.3 MG/DL — LOW (ref 2.5–4.5)
PLATELET # BLD AUTO: 79 K/UL — LOW (ref 150–400)
PMV BLD: 13 FL — SIGNIFICANT CHANGE UP (ref 7–13)
PO2 BLDA: 149 MMHG — HIGH (ref 83–108)
PO2 BLDA: 160 MMHG — HIGH (ref 83–108)
PO2 BLDA: 162 MMHG — HIGH (ref 83–108)
POTASSIUM BLDA-SCNC: 3.8 MMOL/L — SIGNIFICANT CHANGE UP (ref 3.4–4.5)
POTASSIUM BLDA-SCNC: 3.9 MMOL/L — SIGNIFICANT CHANGE UP (ref 3.4–4.5)
POTASSIUM SERPL-MCNC: 4 MMOL/L — SIGNIFICANT CHANGE UP (ref 3.5–5.3)
POTASSIUM SERPL-SCNC: 4 MMOL/L — SIGNIFICANT CHANGE UP (ref 3.5–5.3)
RBC # BLD: 2.6 M/UL — LOW (ref 4.2–5.8)
RBC # FLD: 15.5 % — HIGH (ref 10.3–14.5)
RH IG SCN BLD-IMP: POSITIVE — SIGNIFICANT CHANGE UP
SAO2 % BLDA: 99 % — SIGNIFICANT CHANGE UP (ref 95–99)
SAO2 % BLDA: 99.6 % — HIGH (ref 95–99)
SAO2 % BLDA: 99.7 % — HIGH (ref 95–99)
SODIUM BLDA-SCNC: 139 MMOL/L — SIGNIFICANT CHANGE UP (ref 136–146)
SODIUM BLDA-SCNC: 140 MMOL/L — SIGNIFICANT CHANGE UP (ref 136–146)
SODIUM SERPL-SCNC: 141 MMOL/L — SIGNIFICANT CHANGE UP (ref 135–145)
WBC # BLD: 23.52 K/UL — HIGH (ref 3.8–10.5)
WBC # FLD AUTO: 23.52 K/UL — HIGH (ref 3.8–10.5)

## 2017-09-27 PROCEDURE — 99232 SBSQ HOSP IP/OBS MODERATE 35: CPT

## 2017-09-27 PROCEDURE — 71010: CPT | Mod: 26

## 2017-09-27 PROCEDURE — 99291 CRITICAL CARE FIRST HOUR: CPT

## 2017-09-27 RX ORDER — ALBUMIN HUMAN 25 %
250 VIAL (ML) INTRAVENOUS ONCE
Qty: 0 | Refills: 0 | Status: COMPLETED | OUTPATIENT
Start: 2017-09-27 | End: 2017-09-27

## 2017-09-27 RX ORDER — POTASSIUM PHOSPHATE, MONOBASIC POTASSIUM PHOSPHATE, DIBASIC 236; 224 MG/ML; MG/ML
15 INJECTION, SOLUTION INTRAVENOUS ONCE
Qty: 0 | Refills: 0 | Status: COMPLETED | OUTPATIENT
Start: 2017-09-27 | End: 2017-09-27

## 2017-09-27 RX ORDER — DIVALPROEX SODIUM 500 MG/1
500 TABLET, DELAYED RELEASE ORAL
Qty: 0 | Refills: 0 | Status: DISCONTINUED | OUTPATIENT
Start: 2017-09-27 | End: 2017-11-02

## 2017-09-27 RX ORDER — DEXMEDETOMIDINE HYDROCHLORIDE IN 0.9% SODIUM CHLORIDE 4 UG/ML
0.5 INJECTION INTRAVENOUS
Qty: 200 | Refills: 0 | Status: DISCONTINUED | OUTPATIENT
Start: 2017-09-27 | End: 2017-09-27

## 2017-09-27 RX ORDER — POTASSIUM CHLORIDE 20 MEQ
20 PACKET (EA) ORAL ONCE
Qty: 0 | Refills: 0 | Status: COMPLETED | OUTPATIENT
Start: 2017-09-27 | End: 2017-09-27

## 2017-09-27 RX ORDER — MAGNESIUM SULFATE 500 MG/ML
1 VIAL (ML) INJECTION ONCE
Qty: 0 | Refills: 0 | Status: COMPLETED | OUTPATIENT
Start: 2017-09-27 | End: 2017-09-27

## 2017-09-27 RX ORDER — VALPROIC ACID (AS SODIUM SALT) 250 MG/5ML
500 SOLUTION, ORAL ORAL
Qty: 0 | Refills: 0 | Status: DISCONTINUED | OUTPATIENT
Start: 2017-09-27 | End: 2017-09-27

## 2017-09-27 RX ORDER — CALCIUM GLUCONATE 100 MG/ML
2 VIAL (ML) INTRAVENOUS ONCE
Qty: 0 | Refills: 0 | Status: COMPLETED | OUTPATIENT
Start: 2017-09-27 | End: 2017-09-27

## 2017-09-27 RX ADMIN — Medication 20 MILLIGRAM(S): at 05:28

## 2017-09-27 RX ADMIN — POLYETHYLENE GLYCOL 3350 17 GRAM(S): 17 POWDER, FOR SOLUTION ORAL at 22:25

## 2017-09-27 RX ADMIN — Medication 1 PUFF(S): at 16:05

## 2017-09-27 RX ADMIN — PANTOPRAZOLE SODIUM 40 MILLIGRAM(S): 20 TABLET, DELAYED RELEASE ORAL at 18:48

## 2017-09-27 RX ADMIN — POTASSIUM PHOSPHATE, MONOBASIC POTASSIUM PHOSPHATE, DIBASIC 62.5 MILLIMOLE(S): 236; 224 INJECTION, SOLUTION INTRAVENOUS at 05:02

## 2017-09-27 RX ADMIN — PIPERACILLIN AND TAZOBACTAM 25 GRAM(S): 4; .5 INJECTION, POWDER, LYOPHILIZED, FOR SOLUTION INTRAVENOUS at 05:28

## 2017-09-27 RX ADMIN — HEPARIN SODIUM 5000 UNIT(S): 5000 INJECTION INTRAVENOUS; SUBCUTANEOUS at 22:25

## 2017-09-27 RX ADMIN — DIVALPROEX SODIUM 500 MILLIGRAM(S): 500 TABLET, DELAYED RELEASE ORAL at 22:25

## 2017-09-27 RX ADMIN — Medication 200 GRAM(S): at 05:02

## 2017-09-27 RX ADMIN — Medication 1 PUFF(S): at 04:08

## 2017-09-27 RX ADMIN — PIPERACILLIN AND TAZOBACTAM 25 GRAM(S): 4; .5 INJECTION, POWDER, LYOPHILIZED, FOR SOLUTION INTRAVENOUS at 16:12

## 2017-09-27 RX ADMIN — PANTOPRAZOLE SODIUM 40 MILLIGRAM(S): 20 TABLET, DELAYED RELEASE ORAL at 05:30

## 2017-09-27 RX ADMIN — PIPERACILLIN AND TAZOBACTAM 25 GRAM(S): 4; .5 INJECTION, POWDER, LYOPHILIZED, FOR SOLUTION INTRAVENOUS at 22:25

## 2017-09-27 RX ADMIN — ALBUTEROL 2 PUFF(S): 90 AEROSOL, METERED ORAL at 10:12

## 2017-09-27 RX ADMIN — Medication 500 MILLIGRAM(S): at 05:29

## 2017-09-27 RX ADMIN — Medication 81 MILLIGRAM(S): at 16:12

## 2017-09-27 RX ADMIN — Medication 125 MILLILITER(S): at 18:50

## 2017-09-27 RX ADMIN — CHLORHEXIDINE GLUCONATE 1 APPLICATION(S): 213 SOLUTION TOPICAL at 00:00

## 2017-09-27 RX ADMIN — Medication 2.5 MG/HR: at 08:01

## 2017-09-27 RX ADMIN — Medication 100 GRAM(S): at 10:32

## 2017-09-27 RX ADMIN — DEXMEDETOMIDINE HYDROCHLORIDE IN 0.9% SODIUM CHLORIDE 12.12 MICROGRAM(S)/KG/HR: 4 INJECTION INTRAVENOUS at 10:24

## 2017-09-27 RX ADMIN — Medication 100 MILLIEQUIVALENT(S): at 10:32

## 2017-09-27 RX ADMIN — Medication 20 MILLIGRAM(S): at 18:48

## 2017-09-27 RX ADMIN — PHENYLEPHRINE HYDROCHLORIDE 36.38 MICROGRAM(S)/KG/MIN: 10 INJECTION INTRAVENOUS at 08:00

## 2017-09-27 RX ADMIN — Medication 100 MILLIGRAM(S): at 16:16

## 2017-09-27 RX ADMIN — PROPOFOL 11.64 MICROGRAM(S)/KG/MIN: 10 INJECTION, EMULSION INTRAVENOUS at 08:01

## 2017-09-27 RX ADMIN — Medication 250 MILLILITER(S): at 20:01

## 2017-09-27 RX ADMIN — Medication 1 PUFF(S): at 10:11

## 2017-09-27 RX ADMIN — ATORVASTATIN CALCIUM 80 MILLIGRAM(S): 80 TABLET, FILM COATED ORAL at 22:25

## 2017-09-27 RX ADMIN — VORICONAZOLE 200 MILLIGRAM(S): 10 INJECTION, POWDER, LYOPHILIZED, FOR SOLUTION INTRAVENOUS at 05:30

## 2017-09-27 RX ADMIN — ALBUTEROL 2 PUFF(S): 90 AEROSOL, METERED ORAL at 16:05

## 2017-09-27 RX ADMIN — VORICONAZOLE 200 MILLIGRAM(S): 10 INJECTION, POWDER, LYOPHILIZED, FOR SOLUTION INTRAVENOUS at 18:48

## 2017-09-27 RX ADMIN — ALBUTEROL 2 PUFF(S): 90 AEROSOL, METERED ORAL at 04:10

## 2017-09-27 NOTE — PROGRESS NOTE ADULT - SUBJECTIVE AND OBJECTIVE BOX
Follow Up: sarcoid,  mycetoma resection    Interval History/ROS:  s/p RTOR yesterday for bleeding.  now extubated.    Allergies  No Known Allergies    piperacillin/tazobactam IVPB. 3.375 every 8 hours  voriconazole 200 every 12 hours    OTHER MEDS:  MEDICATIONS  (STANDING):  MEDICATIONS  (STANDING):  atorvastatin 80 at bedtime  acetaminophen    Suspension 650 every 6 hours PRN  polyethylene glycol 3350 17 at bedtime  aluminum hydroxide/magnesium hydroxide/simethicone Suspension 30 every 6 hours PRN  acetaminophen  IVPB. 1000 once PRN  docusate sodium 100 two times a day  phenylephrine    Infusion 2 <Continuous>  propofol Infusion 20 <Continuous>  ALBUTerol    90 MICROgram(s) HFA Inhaler 2 every 6 hours  ipratropium 17 MICROgram(s) HFA Inhaler 1 every 6 hours  norepinephrine Infusion 0.15 <Continuous>  valproic  acid Syrup 500 two times a day  pantoprazole  Injectable 40 every 12 hours  aspirin  chewable 81 daily  heparin  Injectable 5000 every 8 hours  methylPREDNISolone sodium succinate Injectable 20 two times a day  fentaNYL    Injectable 25 every 3 hours PRN  furosemide Infusion 5 <Continuous>  dexmedetomidine Infusion 0.5 <Continuous>      Vital Signs Last 24 Hrs  T(F): 98.9 (09-27-17 @ 13:00), Max: 101.6 (09-26-17 @ 20:00)  HR: 73 (09-27-17 @ 13:00)  BP: 101/60 (09-27-17 @ 08:00)  RR: 14 (09-27-17 @ 13:00)  SpO2: 100% (09-27-17 @ 13:00) (98% - 100%)    PHYSICAL EXAM:  General: awake, lethargic  Respiratory: Clear to auscultation bilaterally; left chest tube x 2 to drainage: serous sanguinous drainage  CV: RRR, S1S2, no murmurs, rubs or gallops  Abdominal: Soft, Non-tender, non-distended, normal bowel sounds  Extremities: trace LE edema  Line Sites: Clear  Skin: No rash                           8.1    23.52 )-----------( 79       ( 27 Sep 2017 02:20 )             23.5 09-27    141  |  100  |  15  ----------------------------<  152  4.0   |  31  |  1.31  Ca    7.9      27 Sep 2017 02:20Phos  2.3     09-27Mg     2.1     09-27  TPro  4.8  /  Alb  2.7  /  TBili  1.3  /  DBili  x   /  AST  43  /  ALT  19  /  AlkPhos  31  09-26      MICROBIOLOGY:  Culture - Acid Fast Smear Concentrated (09.24.17 @ 15:02)    Specimen Source: OTHER    Culture - Acid Fast Smear Concentrated:   AFB SMEAR= NO ACID FAST BACILLI SEEN    Culture - Respiratory with Gram Stain (09.24.17 @ 15:02)    Gram Stain Sputum:   NOS^No Organisms Seen  WBC^White Blood Cells  QNTY CELLS IN GRAM STAIN: FEW (2+)    Culture - Respiratory:   CULTURE IN PROGRESS, FURTHER REPORT TO FOLLOW.      Specimen Source: OTHER  Culture - Yeast and Fungus (09.24.17 @ 15:02)    Culture - Yeast and Fungus:   CULTURE NEGATIVE FOR YEASTS AND MOLDS AFTER 1 DAY  CULTURE NEGATIVE FOR YEASTS AND MOLDS AFTER 2 DAYS    Specimen Source: OTHER        Culture - Yeast and Fungus (09.21.17 @ 18:21)    Culture - Yeast and Fungus:   CULTURE NEGATIVE FOR YEASTS AND MOLDS   AFTER 3 DAYS    Specimen Source: BRONCHIAL LAVAGE    Culture - Acid Fast Smear Concentrated (09.21.17 @ 18:21)    Specimen Source: BRONCHIAL LAVAGE    Culture - Acid Fast Smear Concentrated:   AFB SMEAR= NO ACID FAST BACILLI SEEN    Culture - Respiratory with Gram Stain (09.21.17 @ 18:21)    Gram Stain Sputum:   WBC^White Blood Cells  QUANTITY OF BACTERIA SEEN: MODERATE (3+)  NOS^No Organisms Seen    Culture - Respiratory:   NO GROWTH - PRELIMINARY RESULTS  NRF^Normal Respiratory Katherine  QUANTITY OF GROWTH: FEW    Specimen Source: BRONCHIAL LAVAGE

## 2017-09-27 NOTE — PROGRESS NOTE ADULT - PROBLEM SELECTOR PLAN 1
S/P surgery : reop twice secondary to hemothroax: curently on pressors and ventilator support: He is wheezing extensively: started on IV steroids 20 mg twice a day , but should be given only for few days : follow his wheezing daily as soon as it is resolved; dc steroids For now their is no hemoptysis: He had twice reop yesterday secondary to hemothorax and bleeding from chest tube: currently on pressors and seated and on broad spectrum antibiotics.  9/27: he seems to be doing better today: for extubation

## 2017-09-27 NOTE — PROGRESS NOTE ADULT - PROBLEM SELECTOR PLAN 3
wheezing is present :pk pressures in 30's : started him on steroids again  bronchodilators ATC:  9/27; if he continues to show improvement: can decrease his solumedrol tomorrow

## 2017-09-27 NOTE — PROGRESS NOTE ADULT - SUBJECTIVE AND OBJECTIVE BOX
Patient is a 47y old  Male who presents with a chief complaint of "My leg wouldn't stop shaking." (14 Sep 2017 09:59)    doing better today  on cpap trials for possible extubation today   Any change in ROS:     MEDICATIONS  (STANDING):  atorvastatin 80 milliGRAM(s) Oral at bedtime  chlorhexidine 4% Liquid 1 Application(s) Topical daily  polyethylene glycol 3350 17 Gram(s) Oral at bedtime  piperacillin/tazobactam IVPB. 3.375 Gram(s) IV Intermittent every 8 hours  voriconazole 200 milliGRAM(s) Oral every 12 hours  sodium chloride 0.9%. 1000 milliLiter(s) (10 mL/Hr) IV Continuous <Continuous>  docusate sodium 100 milliGRAM(s) Oral two times a day  phenylephrine    Infusion 2 MICROgram(s)/kG/Min (36.375 mL/Hr) IV Continuous <Continuous>  ALBUTerol    90 MICROgram(s) HFA Inhaler 2 Puff(s) Inhalation every 6 hours  ipratropium 17 MICROgram(s) HFA Inhaler 1 Puff(s) Inhalation every 6 hours  valproic  acid Syrup 500 milliGRAM(s) Oral two times a day  pantoprazole  Injectable 40 milliGRAM(s) IV Push every 12 hours  aspirin  chewable 81 milliGRAM(s) Oral daily  heparin  Injectable 5000 Unit(s) SubCutaneous every 8 hours  methylPREDNISolone sodium succinate Injectable 20 milliGRAM(s) IV Push two times a day    MEDICATIONS  (PRN):  acetaminophen    Suspension 650 milliGRAM(s) Oral every 6 hours PRN For Temp greater than 38 C (100.4 F)  aluminum hydroxide/magnesium hydroxide/simethicone Suspension 30 milliLiter(s) Oral every 6 hours PRN Dyspepsia  acetaminophen  IVPB. 1000 milliGRAM(s) IV Intermittent once PRN Moderate Pain (4 - 6)  fentaNYL    Injectable 25 MICROGram(s) IV Push every 3 hours PRN Moderate Pain (4 - 6)    Vital Signs Last 24 Hrs  T(C): 37.2 (27 Sep 2017 13:00), Max: 38.7 (26 Sep 2017 20:00)  T(F): 98.9 (27 Sep 2017 13:00), Max: 101.6 (26 Sep 2017 20:00)  HR: 92 (27 Sep 2017 16:09) (73 - 119)  BP: 101/60 (27 Sep 2017 08:00) (101/60 - 101/60)  BP(mean): 70 (27 Sep 2017 08:00) (70 - 70)  RR: 20 (27 Sep 2017 16:00) (13 - 27)  SpO2: 93% (27 Sep 2017 16:09) (93% - 100%)  Mode: standby    I&O's Summary    26 Sep 2017 07:01  -  27 Sep 2017 07:00  --------------------------------------------------------  IN: 2576.1 mL / OUT: 4505 mL / NET: -1928.9 mL    27 Sep 2017 07:01  -  27 Sep 2017 17:31  --------------------------------------------------------  IN: 696.9 mL / OUT: 1290 mL / NET: -593.1 mL          Physical Exam:   GENERAL: NAD, well-groomed, well-developed  HEENT: GIOVANI/   Atraumatic, Normocephalic  ENMT: No tonsillar erythema, exudates, or enlargement; Moist mucous membranes, Good dentition, No lesions  NECK: Supple, No JVD, Normal thyroid  CHEST/LUNG: no wheezing today   CVS: Regular rate and rhythm; No murmurs, rubs, or gallops  GI: : Soft, Nontender, Nondistended; Bowel sounds present  NERVOUS SYSTEM:  Alert & Oriented X2  EXTREMITIES:  2+ Peripheral Pulses, No clubbing, cyanosis, or edema  LYMPH: No lymphadenopathy noted  SKIN: No rashes or lesions  ENDOCRINOLOGY: No Thyromegaly  PSYCH: Appropriate    Labs:  ABG - ( 27 Sep 2017 11:13 )  pH: 7.44  /  pCO2: 56    /  pO2: 149   / HCO3: 36    / Base Excess: 12.8  /  SaO2: 99.6            28                            8.1    23.52 )-----------( 79       ( 27 Sep 2017 02:20 )             23.5                         7.9    20.79 )-----------( 79       ( 26 Sep 2017 17:30 )             23.4                         9.0    25.39 )-----------( 86       ( 26 Sep 2017 12:12 )             25.6                         10.8   28.86 )-----------( 74       ( 26 Sep 2017 03:00 )             30.3                         10.1   26.03 )-----------( 72       ( 25 Sep 2017 21:40 )             28.9                         9.8    23.87 )-----------( 57       ( 25 Sep 2017 17:00 )             29.4                         8.3    30.17 )-----------( 93       ( 25 Sep 2017 11:45 )             25.3                         12.5   29.28 )-----------( 126      ( 25 Sep 2017 03:20 )             37.8     09-27    141  |  100  |  15  ----------------------------<  152<H>  4.0   |  31  |  1.31<H>  09-26    151<H>  |  110<H>  |  16  ----------------------------<  129<H>  4.8   |  29  |  1.45<H>  09-26    141  |  104  |  17  ----------------------------<  129<H>  5.0   |  29  |  1.44<H>  09-26    141  |  105  |  17  ----------------------------<  141<H>  5.9<H>   |  28  |  1.22  09-25    138  |  105  |  18  ----------------------------<  174<H>  5.7<H>   |  27  |  1.09  09-25    141  |  104  |  19  ----------------------------<  177<H>  5.5<H>   |  27  |  1.25  09-25    140  |  102  |  16  ----------------------------<  128<H>  5.1   |  26  |  1.10  09-24    140  |  102  |  21  ----------------------------<  105<H>  4.6   |  27  |  0.98  09-24    144  |  101  |  22  ----------------------------<  82  4.2   |  31  |  1.15    Ca    7.9<L>      27 Sep 2017 02:20  Ca    7.7<L>      26 Sep 2017 17:30  Ca    7.1<L>      26 Sep 2017 12:12  Ca    7.9<L>      26 Sep 2017 03:00  Ca    8.0<L>      25 Sep 2017 21:40  Phos  2.3     09-27  Phos  1.3     09-26  Phos  1.3     09-26  Mg     2.1     09-27  Mg     2.0     09-26  Mg     1.9     09-26  Mg     1.8     09-26    TPro  4.8<L>  /  Alb  2.7<L>  /  TBili  1.3<H>  /  DBili  x   /  AST  43<H>  /  ALT  19  /  AlkPhos  31<L>  09-26  TPro  4.1<L>  /  Alb  2.3<L>  /  TBili  1.9<H>  /  DBili  x   /  AST  35  /  ALT  16  /  AlkPhos  28<L>  09-25  TPro  4.9<L>  /  Alb  2.9<L>  /  TBili  1.2  /  DBili  x   /  AST  31  /  ALT  18  /  AlkPhos  31<L>  09-25  TPro  5.6<L>  /  Alb  2.9<L>  /  TBili  1.3<H>  /  DBili  x   /  AST  35  /  ALT  23  /  AlkPhos  36<L>  09-24    CAPILLARY BLOOD GLUCOSE          LIVER FUNCTIONS - ( 26 Sep 2017 03:00 )  Alb: 2.7 g/dL / Pro: 4.8 g/dL / ALK PHOS: 31 u/L / ALT: 19 u/L / AST: 43 u/L / GGT: x           PT/INR - ( 26 Sep 2017 03:00 )   PT: 15.8 SEC;   INR: 1.40          PTT - ( 26 Sep 2017 03:00 )  PTT:26.7 SEC    Lactate, Blood: 1.9 mmol/L (09-24 @ 16:00)  Cultures:                             Studies  Chest X-RAY  CT SCAN Chest   Venous Dopplers: LE:   CT Abdomen  Others      < from: Xray Chest 1 View AP -PORTABLE-Routine (09.27.17 @ 07:38) >  INTERPRETATION:  TIME OF EXAM: September 27, 2017 at 6:42 AM    CLINICAL INFORMATION: Post thoracotomy; aspergilloma.    TECHNIQUE:   Portable chest    INTERPRETATION:     The endotracheal and enteric tube in addition to left-sided chest tubes   are unchanged.  Interstitial opacities consistent with sarcoid again   seen. Left hemidiaphragm obscured likely from small effusion. No   pneumothorax.      COMPARISON:  September 26 without significant change.      IMPRESSION:  Follow-up study showing chronic changes of sarcoidosis with   tubes and lines unchanged.    < end of copied text >

## 2017-09-27 NOTE — PROGRESS NOTE ADULT - ASSESSMENT
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 Extubated and remains on empiric Zosyn and Voriconazole. OR cultures normal respiratory gracie, fungal and AFB cultures testing.  Path testing as well.     Would continue voriconazole and Zosyn.  Consider broadening Zosyn to meropenem if febrile.

## 2017-09-27 NOTE — PROGRESS NOTE ADULT - SUBJECTIVE AND OBJECTIVE BOX
OSCAR CHACKO            MRN-[id number]         No Known Allergies                 47 M PMH of sarcoidosis, HTN, COPD, and asthma who was initially admitted on 09/10 w cc of severe leg spasms X two days  In E.D. pt found to be wheezing, so he was seen by Pulmonary and started on IV Steroids for sarcoidosis  On 9/12 at 4 am, a RRT and Stroke codes were called as the patient became unresponsive.  He was transferred to MICU and then intubated for airway protection.   The patient was found to have a new infarct on MRI and also found to have a patent PFO on this admission as well. He was weaned off vent on 09/13 and transferred to the floors   There was concern patient was having paroxysmal afib, so he was started on Eliquis yesterday evening. He has received 2 doses of Eliquis on 09/19.   However, on 09/20, the patient had a coughing episode, which was not unusual for him, but during this episode he began to cough up fresh blood, about 1/2 cup of blood. He underwent Bronchoscopy with bronchoalveolar lavage of both sides and epi injection on 09/21 and was to have received further IR embolization for hemoptysis control in the next few days.  However, he developed hemoptysis again and underwent urgent LULectomy, complicated with post op bleeding.  POD # 3 (092417): Left VATS and DAVID lobectomy  POD # 2 (092517): re-op L thoracotomy, evacuation of hemothorax (600cc clot, 400cc blood) no obvious site of bleeding  POD # 2 (092517): re-op L thoracotomy, evacuation of hemothorax; RTOR x2 100cc clot; 200cc blood: bleeding posterior intercostal arteries identified and cauterized      Issues:   Post- op  Resp failure               Hypotension              post op bleeding & Anemia              Aspergilloma on Voriconazole              Sarcoidosis on Prednisone              PFO              Pulm edema              s/p massive transfusion     Drips:  Eldon 0.6 mcg/min                   Home Medications:   * Incomplete Medication History as of 11-Sep-2017 09:23 documented in Structured Notes  · 	Azithromycin 5 Day Dose Pack 250 mg oral tablet: 1 dose(s) orally once a day for respiratory infection, Last Dose Taken:    · 	predniSONE 50 mg oral tablet: 1 tab(s) orally once a day , Last Dose Taken:    · 	albuterol 1.25 mg/3 mL (0.042%) inhalation solution: 3 milliliter(s) inhaled 3 times a day, Last Dose Taken:    · 	losartan 25 mg oral tablet: 1 tab(s) orally 2 times a day, Last Dose Taken:    · 	Symbicort 160 mcg-4.5 mcg/inh inhalation aerosol: 2 puff(s) inhaled 2 times a day, Last Dose Taken:    · 	Incruse Ellipta: 1 puff(s) inhaled once a day, Last Dose Taken:    PAST MEDICAL & SURGICAL HISTORY:  History of pneumothorax: History of 3 prior pneumonthoracies.  COPD (chronic obstructive pulmonary disease)  Asthma  Hypertension  Sarcoidosis  No significant past surgical history        ICU Vital Signs Last 24 Hrs  T(C): 37.2 (27 Sep 2017 13:00), Max: 38.7 (26 Sep 2017 20:00)  T(F): 98.9 (27 Sep 2017 13:00), Max: 101.6 (26 Sep 2017 20:00)  HR: 76 (27 Sep 2017 14:00) (73 - 122)  BP: 101/60 (27 Sep 2017 08:00) (101/60 - 101/60)  BP(mean): 70 (27 Sep 2017 08:00) (70 - 70)  ABP: 100/54 (27 Sep 2017 14:00) (90/57 - 109/64)  ABP(mean): 69 (27 Sep 2017 14:00) (66 - 82)  RR: 20 (27 Sep 2017 14:00) (13 - 27)  SpO2: 100% (27 Sep 2017 14:00) (98% - 100%)    I&O's Summary    26 Sep 2017 07:01  -  27 Sep 2017 07:00  --------------------------------------------------------  IN: 2576.1 mL / OUT: 4505 mL / NET: -1928.9 mL    27 Sep 2017 07:01  -  27 Sep 2017 14:43  --------------------------------------------------------  IN: 631.1 mL / OUT: 1290 mL / NET: -658.9 mL      CAPILLARY BLOOD GLUCOSE      MEDICATIONS  (STANDING):  atorvastatin 80 milliGRAM(s) Oral at bedtime  chlorhexidine 4% Liquid 1 Application(s) Topical daily  polyethylene glycol 3350 17 Gram(s) Oral at bedtime  piperacillin/tazobactam IVPB. 3.375 Gram(s) IV Intermittent every 8 hours  voriconazole 200 milliGRAM(s) Oral every 12 hours  sodium chloride 0.9%. 1000 milliLiter(s) (10 mL/Hr) IV Continuous <Continuous>  docusate sodium 100 milliGRAM(s) Oral two times a day  phenylephrine    Infusion 2 MICROgram(s)/kG/Min (36.375 mL/Hr) IV Continuous <Continuous>  propofol Infusion 20 MICROgram(s)/kG/Min (11.64 mL/Hr) IV Continuous <Continuous>  ALBUTerol    90 MICROgram(s) HFA Inhaler 2 Puff(s) Inhalation every 6 hours  ipratropium 17 MICROgram(s) HFA Inhaler 1 Puff(s) Inhalation every 6 hours  norepinephrine Infusion 0.15 MICROgram(s)/kG/Min (27.281 mL/Hr) IV Continuous <Continuous>  valproic  acid Syrup 500 milliGRAM(s) Oral two times a day  pantoprazole  Injectable 40 milliGRAM(s) IV Push every 12 hours  aspirin  chewable 81 milliGRAM(s) Oral daily  heparin  Injectable 5000 Unit(s) SubCutaneous every 8 hours  methylPREDNISolone sodium succinate Injectable 20 milliGRAM(s) IV Push two times a day  furosemide Infusion 5 mG/Hr (2.5 mL/Hr) IV Continuous <Continuous>  dexmedetomidine Infusion 0.5 MICROgram(s)/kG/Hr (12.125 mL/Hr) IV Continuous <Continuous>    MEDICATIONS  (PRN):  acetaminophen    Suspension 650 milliGRAM(s) Oral every 6 hours PRN For Temp greater than 38 C (100.4 F)  aluminum hydroxide/magnesium hydroxide/simethicone Suspension 30 milliLiter(s) Oral every 6 hours PRN Dyspepsia  acetaminophen  IVPB. 1000 milliGRAM(s) IV Intermittent once PRN Moderate Pain (4 - 6)  fentaNYL    Injectable 25 MICROGram(s) IV Push every 3 hours PRN Moderate Pain (4 - 6)      Physical exam:                             General:               Pt is awake, alert, on vent support                                                 Neuro:                 Nonfocal                             Cardiovascular:    S1 & S2, regular                           Respiratory:         Air entry is fair and has bilateral conducted sounds                           GI:                          Soft, nondistended and nontender, Bowel sounds active                            Ext:                        No cyanosis &  mild  bilateral pedal edema of upper ext    Labs:                                                                           8.1    23.52 )-----------( 79       ( 27 Sep 2017 02:20 )             23.5             09-27    141  |  100  |  15  ----------------------------<  152<H>  4.0   |  31  |  1.31<H>    Ca    7.9<L>      27 Sep 2017 02:20  Phos  2.3     09-27  Mg     2.1     09-27    TPro  4.8<L>  /  Alb  2.7<L>  /  TBili  1.3<H>  /  DBili  x   /  AST  43<H>  /  ALT  19  /  AlkPhos  31<L>  09-26                  PT/INR - ( 26 Sep 2017 03:00 )   PT: 15.8 SEC;   INR: 1.40          PTT - ( 26 Sep 2017 03:00 )  PTT:26.7 SEC    Culture - Respiratory with Gram Stain (09.24.17 @ 15:02)    Gram Stain Sputum:   NOS^No Organisms Seen  WBC^White Blood Cells  QNTY CELLS IN GRAM STAIN: FEW (2+)    Culture - Respiratory:   NO ORGANISMS ISOLATED AT 24 HOURS  NO ORGANISMS ISOLATED AT 48 HRS.    Specimen Source: OTHER        CXR: < from: Xray Chest 1 View AP -PORTABLE-Routine (09.27.17 @ 07:38) >  The endotracheal and enteric tube in addition to left-sided chest tubes   are unchanged.  Interstitial opacities consistent with sarcoid again   seen. Left hemidiaphragm obscured likely from small effusion. No   pneumothorax        Plan:    General: 47yMale s/p  POD# 3  progressing well, on less pressor support & full vent support                            Neuro:                                         Pain control with Fentanyl IV PRN                            Cardiovascular:                                          Continue hemodynamic monitoring.    Hypotension: On Eldon - Titrate to MAP>65    Transfuse PRBC for Anemia and to help with improving hemodynamic status                            Respiratory:                                         Pt is on vent support                                                                                                                                                                                                                                                                                                                                                                                                                                                                                                                                                                                                                                                                                                                                                                                                                                                                                                           Comfortable, not in any distress.                                         Using incentive spirometry                                          Monitor chest tube output                                         Chest tube to suction / water seal                                                                  Continue bronchodilators, pulmonary toilet                            GI                                         On clear liquids / regular diet, tolerating                                         Continue GI prophylaxis with Pepcid / Protonix                                         Continue Zofran / Reglan for nausea - PRN	                                                                 Renal:                                         Continue LR 30cc/hr                                         Monitor I/Os and electrolytes                                         D/C [place]                                                  Hem/ Onc:                                                                                  Monitor chest tube output &  signs of bleeding.                                          Follow CBC in AM                           Infectious disease:                                            No signs of infection. Monitor for fever / leukocytosis.                                          All surgical incision / chest tube  sites look clean                            Endocrine                                             Continue Accu-Checks with coverage        Pertinent clinical, laboratory, radiographic, hemodynamic, echocardiographic, respiratory data, microbiologic data and chart were reviewed and analyzed frequently throughout the course of the day and night  Patient seen, examined and plan discussed with CT Surgeon / CTICU team during rounds.    Pt's status discussed with family at bedside, updated status    I have spent     minutes of critical care time with this pt between   am/pm and   am/ pm        Len Kim MD OSCAR CHACKO            MRN-[id number]         No Known Allergies                 47 M PMH of sarcoidosis, HTN, COPD, and asthma who was initially admitted on 09/10 w cc of severe leg spasms X two days  In E.D. pt found to be wheezing, so he was seen by Pulmonary and started on IV Steroids for sarcoidosis  On 9/12 at 4 am, a RRT and Stroke codes were called as the patient became unresponsive.  He was transferred to MICU and then intubated for airway protection.   The patient was found to have a new infarct on MRI and also found to have a patent PFO on this admission as well. He was weaned off vent on 09/13 and transferred to the floors   There was concern patient was having paroxysmal afib, so he was started on Eliquis yesterday evening. He has received 2 doses of Eliquis on 09/19.   However, on 09/20, the patient had a coughing episode, which was not unusual for him, but during this episode he began to cough up fresh blood, about 1/2 cup of blood. He underwent Bronchoscopy with bronchoalveolar lavage of both sides and epi injection on 09/21 and was to have received further IR embolization for hemoptysis control in the next few days.  However, he developed hemoptysis again and underwent urgent LULectomy, complicated with post op bleeding.  POD # 3 (092417): Left VATS and DAVID lobectomy  POD # 2 (092517): re-op L thoracotomy, evacuation of hemothorax (600cc clot, 400cc blood) no obvious site of bleeding  POD # 2 (092517): re-op L thoracotomy, evacuation of hemothorax; RTOR x2 100cc clot; 200cc blood: bleeding posterior intercostal arteries identified and cauterized      Issues:   Post- op  Resp failure               Hypotension              post op bleeding & Anemia              Aspergilloma on Voriconazole              Sarcoidosis on Prednisone              PFO              Pulm edema              s/p massive transfusion     Drips:  Eldon 0.6 mcg/min                   Home Medications:   * Incomplete Medication History as of 11-Sep-2017 09:23 documented in Structured Notes  · 	Azithromycin 5 Day Dose Pack 250 mg oral tablet: 1 dose(s) orally once a day for respiratory infection, Last Dose Taken:    · 	predniSONE 50 mg oral tablet: 1 tab(s) orally once a day , Last Dose Taken:    · 	albuterol 1.25 mg/3 mL (0.042%) inhalation solution: 3 milliliter(s) inhaled 3 times a day, Last Dose Taken:    · 	losartan 25 mg oral tablet: 1 tab(s) orally 2 times a day, Last Dose Taken:    · 	Symbicort 160 mcg-4.5 mcg/inh inhalation aerosol: 2 puff(s) inhaled 2 times a day, Last Dose Taken:    · 	Incruse Ellipta: 1 puff(s) inhaled once a day, Last Dose Taken:    PAST MEDICAL & SURGICAL HISTORY:  History of pneumothorax: History of 3 prior pneumonthoracies.  COPD (chronic obstructive pulmonary disease)  Asthma  Hypertension  Sarcoidosis  No significant past surgical history        ICU Vital Signs Last 24 Hrs  T(C): 37.2 (27 Sep 2017 13:00), Max: 38.7 (26 Sep 2017 20:00)  T(F): 98.9 (27 Sep 2017 13:00), Max: 101.6 (26 Sep 2017 20:00)  HR: 76 (27 Sep 2017 14:00) (73 - 122)  BP: 101/60 (27 Sep 2017 08:00) (101/60 - 101/60)  BP(mean): 70 (27 Sep 2017 08:00) (70 - 70)  ABP: 100/54 (27 Sep 2017 14:00) (90/57 - 109/64)  ABP(mean): 69 (27 Sep 2017 14:00) (66 - 82)  RR: 20 (27 Sep 2017 14:00) (13 - 27)  SpO2: 100% (27 Sep 2017 14:00) (98% - 100%)    I&O's Summary    26 Sep 2017 07:01  -  27 Sep 2017 07:00  --------------------------------------------------------  IN: 2576.1 mL / OUT: 4505 mL / NET: -1928.9 mL    27 Sep 2017 07:01  -  27 Sep 2017 14:43  --------------------------------------------------------  IN: 631.1 mL / OUT: 1290 mL / NET: -658.9 mL      CAPILLARY BLOOD GLUCOSE      MEDICATIONS  (STANDING):  atorvastatin 80 milliGRAM(s) Oral at bedtime  chlorhexidine 4% Liquid 1 Application(s) Topical daily  polyethylene glycol 3350 17 Gram(s) Oral at bedtime  piperacillin/tazobactam IVPB. 3.375 Gram(s) IV Intermittent every 8 hours  voriconazole 200 milliGRAM(s) Oral every 12 hours  sodium chloride 0.9%. 1000 milliLiter(s) (10 mL/Hr) IV Continuous <Continuous>  docusate sodium 100 milliGRAM(s) Oral two times a day  phenylephrine    Infusion 2 MICROgram(s)/kG/Min (36.375 mL/Hr) IV Continuous <Continuous>  propofol Infusion 20 MICROgram(s)/kG/Min (11.64 mL/Hr) IV Continuous <Continuous>  ALBUTerol    90 MICROgram(s) HFA Inhaler 2 Puff(s) Inhalation every 6 hours  ipratropium 17 MICROgram(s) HFA Inhaler 1 Puff(s) Inhalation every 6 hours  norepinephrine Infusion 0.15 MICROgram(s)/kG/Min (27.281 mL/Hr) IV Continuous <Continuous>  valproic  acid Syrup 500 milliGRAM(s) Oral two times a day  pantoprazole  Injectable 40 milliGRAM(s) IV Push every 12 hours  aspirin  chewable 81 milliGRAM(s) Oral daily  heparin  Injectable 5000 Unit(s) SubCutaneous every 8 hours  methylPREDNISolone sodium succinate Injectable 20 milliGRAM(s) IV Push two times a day  furosemide Infusion 5 mG/Hr (2.5 mL/Hr) IV Continuous <Continuous>  dexmedetomidine Infusion 0.5 MICROgram(s)/kG/Hr (12.125 mL/Hr) IV Continuous <Continuous>    MEDICATIONS  (PRN):  acetaminophen    Suspension 650 milliGRAM(s) Oral every 6 hours PRN For Temp greater than 38 C (100.4 F)  aluminum hydroxide/magnesium hydroxide/simethicone Suspension 30 milliLiter(s) Oral every 6 hours PRN Dyspepsia  acetaminophen  IVPB. 1000 milliGRAM(s) IV Intermittent once PRN Moderate Pain (4 - 6)  fentaNYL    Injectable 25 MICROGram(s) IV Push every 3 hours PRN Moderate Pain (4 - 6)      Physical exam:                             General:               Pt is awake, alert, on vent support                                                 Neuro:                 Nonfocal                             Cardiovascular:    S1 & S2, regular                           Respiratory:         Air entry is fair and has bilateral conducted sounds                           GI:                          Soft, nondistended and nontender, Bowel sounds active                            Ext:                        No cyanosis &  mild  bilateral  edema of upper ext    Labs:                                                                           8.1    23.52 )-----------( 79       ( 27 Sep 2017 02:20 )             23.5             09-27    141  |  100  |  15  ----------------------------<  152<H>  4.0   |  31  |  1.31<H>    Ca    7.9<L>      27 Sep 2017 02:20  Phos  2.3     09-27  Mg     2.1     09-27    TPro  4.8<L>  /  Alb  2.7<L>  /  TBili  1.3<H>  /  DBili  x   /  AST  43<H>  /  ALT  19  /  AlkPhos  31<L>  09-26                  PT/INR - ( 26 Sep 2017 03:00 )   PT: 15.8 SEC;   INR: 1.40          PTT - ( 26 Sep 2017 03:00 )  PTT:26.7 SEC    Culture - Respiratory with Gram Stain (09.24.17 @ 15:02)    Gram Stain Sputum:   NOS^No Organisms Seen  WBC^White Blood Cells  QNTY CELLS IN GRAM STAIN: FEW (2+)    Culture - Respiratory:   NO ORGANISMS ISOLATED AT 24 HOURS  NO ORGANISMS ISOLATED AT 48 HRS.    Specimen Source: OTHER        CXR: < from: Xray Chest 1 View AP -PORTABLE-Routine (09.27.17 @ 07:38) >  The endotracheal and enteric tube in addition to left-sided chest tubes   are unchanged.  Interstitial opacities consistent with sarcoid again   seen. Left hemidiaphragm obscured likely from small effusion. No   pneumothorax        Plan:    General: 47yMale s/p  POD# 3  progressing well, on less pressor support & full vent support                            Neuro:                                         Pain control with Fentanyl IV PRN                            Cardiovascular:                                          Continue hemodynamic monitoring.    Hypotension: On Eldon - Titrate to MAP>65    Transfuse PRBC for Anemia and to help with improving hemodynamic status                            Respiratory:                                         Pt is on vent support   HV--40-5                                                                                                                                                                                                                                                                                                                                                                                                                                                                                                                                                                                                                                                                                                                                                                                                                                                                                                       Comfortable, not in any distress.                                         Monitor chest tube output                                         Chest tube to suction                                                                 Sarcoidosis on Solumedrol. Continue bronchodilators & pulmonary toilet     Plan CPAP wean and extubation today                            GI                                         NPO                                         Continue GI prophylaxis with Protonix                                         Continue Zofran / Reglan for nausea - PRN	                                                                 Renal:                                         Continue LR 30cc/hr                                         Monitor I/Os and electrolytes                                         d/C Lasix drip                                                 Hem/ Onc:                                                                                  Monitor chest tube output &  output from chest tubes is serous                                         Transfuse PRBC &  Follow CBC                            Infectious disease:    Aspergilloma - On voriconazole 200 milliGRAM(s) Oral every 12 hours      Monitor for fever / leukocytosis.                                         All surgical incision / chest tube  sites look clean                            Endocrine                                             Continue Accu-Checks with coverage        Pertinent clinical, laboratory, radiographic, hemodynamic, echocardiographic, respiratory data, microbiologic data and chart were reviewed and analyzed frequently throughout the course of the day and night  Patient seen, examined and plan discussed with CT Surgeon / CTICU team during rounds.    Pt's status discussed with family at bedside, updated status    I have spent  90   minutes of critical care time with this pt between 7  am and  12am        Len Kim MD

## 2017-09-28 PROBLEM — D86.9 SARCOIDOSIS, UNSPECIFIED: Chronic | Status: ACTIVE | Noted: 2017-09-11

## 2017-09-28 PROBLEM — Z00.00 ENCOUNTER FOR PREVENTIVE HEALTH EXAMINATION: Status: ACTIVE | Noted: 2017-09-28

## 2017-09-28 LAB
BASE EXCESS BLDA CALC-SCNC: 13 MMOL/L — SIGNIFICANT CHANGE UP
BASE EXCESS BLDA CALC-SCNC: 13.3 MMOL/L — SIGNIFICANT CHANGE UP
BUN SERPL-MCNC: 19 MG/DL — SIGNIFICANT CHANGE UP (ref 7–23)
CALCIUM SERPL-MCNC: 8.4 MG/DL — SIGNIFICANT CHANGE UP (ref 8.4–10.5)
CHLORIDE BLDA-SCNC: 99 MMOL/L — SIGNIFICANT CHANGE UP (ref 96–108)
CHLORIDE SERPL-SCNC: 101 MMOL/L — SIGNIFICANT CHANGE UP (ref 98–107)
CO2 SERPL-SCNC: 38 MMOL/L — HIGH (ref 22–31)
CREAT BLDA-MCNC: 1.05 MG/DL — SIGNIFICANT CHANGE UP (ref 0.5–1.3)
CREAT SERPL-MCNC: 1.06 MG/DL — SIGNIFICANT CHANGE UP (ref 0.5–1.3)
GLUCOSE BLDA-MCNC: 133 MG/DL — HIGH (ref 70–99)
GLUCOSE SERPL-MCNC: 140 MG/DL — HIGH (ref 70–99)
HCO3 BLDA-SCNC: 36 MMOL/L — HIGH (ref 22–26)
HCO3 BLDA-SCNC: 36 MMOL/L — HIGH (ref 22–26)
HCT VFR BLD CALC: 24.2 % — LOW (ref 39–50)
HCT VFR BLDA CALC: 26.4 % — LOW (ref 39–51)
HCV RNA SERPL NAA DL=5-ACNC: NOT DETECTED IU/ML — SIGNIFICANT CHANGE UP
HCV RNA SPEC NAA+PROBE-LOG IU: SIGNIFICANT CHANGE UP LOGIU/ML
HGB BLD-MCNC: 8.1 G/DL — LOW (ref 13–17)
HGB BLDA-MCNC: 8.5 G/DL — LOW (ref 13–17)
LACTATE BLDA-SCNC: 1.1 MMOL/L — SIGNIFICANT CHANGE UP (ref 0.5–2)
MAGNESIUM SERPL-MCNC: 2.1 MG/DL — SIGNIFICANT CHANGE UP (ref 1.6–2.6)
MCHC RBC-ENTMCNC: 30.5 PG — SIGNIFICANT CHANGE UP (ref 27–34)
MCHC RBC-ENTMCNC: 33.5 % — SIGNIFICANT CHANGE UP (ref 32–36)
MCV RBC AUTO: 91 FL — SIGNIFICANT CHANGE UP (ref 80–100)
NRBC # FLD: 0 — SIGNIFICANT CHANGE UP
PCO2 BLDA: 64 MMHG — HIGH (ref 35–48)
PCO2 BLDA: 76 MMHG — CRITICAL HIGH (ref 35–48)
PH BLDA: 7.34 PH — LOW (ref 7.35–7.45)
PH BLDA: 7.4 PH — SIGNIFICANT CHANGE UP (ref 7.35–7.45)
PHOSPHATE SERPL-MCNC: 3.2 MG/DL — SIGNIFICANT CHANGE UP (ref 2.5–4.5)
PLATELET # BLD AUTO: 123 K/UL — LOW (ref 150–400)
PMV BLD: 12.5 FL — SIGNIFICANT CHANGE UP (ref 7–13)
PO2 BLDA: 120 MMHG — HIGH (ref 83–108)
PO2 BLDA: 132 MMHG — HIGH (ref 83–108)
POTASSIUM BLDA-SCNC: 4 MMOL/L — SIGNIFICANT CHANGE UP (ref 3.4–4.5)
POTASSIUM SERPL-MCNC: 4.3 MMOL/L — SIGNIFICANT CHANGE UP (ref 3.5–5.3)
POTASSIUM SERPL-SCNC: 4.3 MMOL/L — SIGNIFICANT CHANGE UP (ref 3.5–5.3)
RBC # BLD: 2.66 M/UL — LOW (ref 4.2–5.8)
RBC # FLD: 15.6 % — HIGH (ref 10.3–14.5)
SAO2 % BLDA: 98.8 % — SIGNIFICANT CHANGE UP (ref 95–99)
SAO2 % BLDA: 99.1 % — HIGH (ref 95–99)
SODIUM BLDA-SCNC: 144 MMOL/L — SIGNIFICANT CHANGE UP (ref 136–146)
SODIUM SERPL-SCNC: 146 MMOL/L — HIGH (ref 135–145)
SPECIMEN SOURCE: SIGNIFICANT CHANGE UP
WBC # BLD: 25.41 K/UL — HIGH (ref 3.8–10.5)
WBC # FLD AUTO: 25.41 K/UL — HIGH (ref 3.8–10.5)

## 2017-09-28 PROCEDURE — 71010: CPT | Mod: 26

## 2017-09-28 PROCEDURE — 99233 SBSQ HOSP IP/OBS HIGH 50: CPT

## 2017-09-28 RX ADMIN — PIPERACILLIN AND TAZOBACTAM 25 GRAM(S): 4; .5 INJECTION, POWDER, LYOPHILIZED, FOR SOLUTION INTRAVENOUS at 21:59

## 2017-09-28 RX ADMIN — DIVALPROEX SODIUM 500 MILLIGRAM(S): 500 TABLET, DELAYED RELEASE ORAL at 17:36

## 2017-09-28 RX ADMIN — DIVALPROEX SODIUM 500 MILLIGRAM(S): 500 TABLET, DELAYED RELEASE ORAL at 05:44

## 2017-09-28 RX ADMIN — VORICONAZOLE 200 MILLIGRAM(S): 10 INJECTION, POWDER, LYOPHILIZED, FOR SOLUTION INTRAVENOUS at 05:44

## 2017-09-28 RX ADMIN — HEPARIN SODIUM 5000 UNIT(S): 5000 INJECTION INTRAVENOUS; SUBCUTANEOUS at 21:59

## 2017-09-28 RX ADMIN — VORICONAZOLE 200 MILLIGRAM(S): 10 INJECTION, POWDER, LYOPHILIZED, FOR SOLUTION INTRAVENOUS at 17:36

## 2017-09-28 RX ADMIN — ALBUTEROL 2 PUFF(S): 90 AEROSOL, METERED ORAL at 16:29

## 2017-09-28 RX ADMIN — Medication 20 MILLIGRAM(S): at 05:46

## 2017-09-28 RX ADMIN — Medication 81 MILLIGRAM(S): at 11:38

## 2017-09-28 RX ADMIN — PANTOPRAZOLE SODIUM 40 MILLIGRAM(S): 20 TABLET, DELAYED RELEASE ORAL at 17:36

## 2017-09-28 RX ADMIN — ALBUTEROL 2 PUFF(S): 90 AEROSOL, METERED ORAL at 10:41

## 2017-09-28 RX ADMIN — PIPERACILLIN AND TAZOBACTAM 25 GRAM(S): 4; .5 INJECTION, POWDER, LYOPHILIZED, FOR SOLUTION INTRAVENOUS at 05:45

## 2017-09-28 RX ADMIN — Medication 1 PUFF(S): at 16:29

## 2017-09-28 RX ADMIN — PIPERACILLIN AND TAZOBACTAM 25 GRAM(S): 4; .5 INJECTION, POWDER, LYOPHILIZED, FOR SOLUTION INTRAVENOUS at 14:22

## 2017-09-28 RX ADMIN — HEPARIN SODIUM 5000 UNIT(S): 5000 INJECTION INTRAVENOUS; SUBCUTANEOUS at 14:22

## 2017-09-28 RX ADMIN — ALBUTEROL 2 PUFF(S): 90 AEROSOL, METERED ORAL at 22:12

## 2017-09-28 RX ADMIN — Medication 1 PUFF(S): at 10:41

## 2017-09-28 RX ADMIN — PANTOPRAZOLE SODIUM 40 MILLIGRAM(S): 20 TABLET, DELAYED RELEASE ORAL at 05:45

## 2017-09-28 RX ADMIN — POLYETHYLENE GLYCOL 3350 17 GRAM(S): 17 POWDER, FOR SOLUTION ORAL at 21:41

## 2017-09-28 RX ADMIN — Medication 1 PUFF(S): at 22:12

## 2017-09-28 RX ADMIN — HEPARIN SODIUM 5000 UNIT(S): 5000 INJECTION INTRAVENOUS; SUBCUTANEOUS at 05:45

## 2017-09-28 RX ADMIN — CHLORHEXIDINE GLUCONATE 1 APPLICATION(S): 213 SOLUTION TOPICAL at 21:40

## 2017-09-28 RX ADMIN — ATORVASTATIN CALCIUM 80 MILLIGRAM(S): 80 TABLET, FILM COATED ORAL at 21:59

## 2017-09-28 RX ADMIN — Medication 100 MILLIGRAM(S): at 17:35

## 2017-09-28 RX ADMIN — Medication 100 MILLIGRAM(S): at 05:44

## 2017-09-28 RX ADMIN — Medication 20 MILLIGRAM(S): at 17:37

## 2017-09-28 NOTE — PROGRESS NOTE ADULT - PROBLEM SELECTOR PLAN 3
wheezing is present :pk pressures in 30's : started him on steroids again  bronchodilators ATC:  9/27; if he continues to show improvement: can decrease his solumedrol tomorrow  9/28: decreases his IV steroids to 20 mg once a day for a few days more

## 2017-09-28 NOTE — PROGRESS NOTE ADULT - PROBLEM SELECTOR PLAN 6
on steroids at this time: it was given only for a short duration: thinking his wheezing is secondary to rhinovirus infection: It will cont till tomorrow: thereafter would taper it down to off in next 2-3

## 2017-09-28 NOTE — PROGRESS NOTE ADULT - PROBLEM SELECTOR PLAN 1
S/P surgery : reop twice secondary to hemothroax: curently on pressors and ventilator support: He is wheezing extensively: started on IV steroids 20 mg twice a day , but should be given only for few days : follow his wheezing daily as soon as it is resolved; dc steroids For now their is no hemoptysis: He had twice reop yesterday secondary to hemothorax and bleeding from chest tube: currently on pressors and seated and on broad spectrum antibiotics.  9/27: he seems to be doing better today: for extubation  9/28: extubated: alert and awake and responding

## 2017-09-28 NOTE — PROGRESS NOTE ADULT - SUBJECTIVE AND OBJECTIVE BOX
47 M PMH of sarcoidosis, HTN, COPD, and asthma who was initially admitted on 09/10 w cc of severe leg spasms X two days  In E.D. pt found to be wheezing, so he was seen by Pulmonary and started on IV Steroids for sarcoidosis  On 9/12 at 4 am, a RRT and Stroke codes were called as the patient became unresponsive.  He was transferred to MICU and then intubated for airway protection.   The patient was found to have a new infarct on MRI and also found to have a patent PFO on this admission as well. He was weaned off vent on 09/13 and transferred to the floors   There was concern patient was having paroxysmal afib, so he was started on Eliquis yesterday evening. He has received 2 doses of Eliquis on 09/19.   However, on 09/20, the patient had a coughing episode, which was not unusual for him, but during this episode he began to cough up fresh blood, about 1/2 cup of blood. He underwent Bronchoscopy with bronchoalveolar lavage of both sides and epi injection on 09/21 and was to have received further IR embolization for hemoptysis control in the next few days.  However, he developed hemoptysis again and underwent urgent LULectomy, complicated with post op bleeding.  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              Sarcoidosis on Prednisone              PFO              Pulm edema, possible TRALI              s/p massive transfusion     Drips:  Eldon 0.2- 0.4 mcg/min           Allergies:  	No Known Allergies:     Home Medications:   * Incomplete Medication History as of 11-Sep-2017 09:23 documented in Structured Notes  · 	Azithromycin 5 Day Dose Pack 250 mg oral tablet: 1 dose(s) orally once a day for respiratory infection, Last Dose Taken:    · 	predniSONE 50 mg oral tablet: 1 tab(s) orally once a day , Last Dose Taken:    · 	albuterol 1.25 mg/3 mL (0.042%) inhalation solution: 3 milliliter(s) inhaled 3 times a day, Last Dose Taken:    · 	losartan 25 mg oral tablet: 1 tab(s) orally 2 times a day, Last Dose Taken:    · 	Symbicort 160 mcg-4.5 mcg/inh inhalation aerosol: 2 puff(s) inhaled 2 times a day, Last Dose Taken:    · 	Incruse Ellipta: 1 puff(s) inhaled once a day, Last Dose Taken:      MEDICATIONS  (STANDING):  atorvastatin 80 milliGRAM(s) Oral at bedtime  chlorhexidine 4% Liquid 1 Application(s) Topical daily  polyethylene glycol 3350 17 Gram(s) Oral at bedtime  piperacillin/tazobactam IVPB. 3.375 Gram(s) IV Intermittent every 8 hours  voriconazole 200 milliGRAM(s) Oral every 12 hours  sodium chloride 0.9%. 1000 milliLiter(s) (10 mL/Hr) IV Continuous <Continuous>  docusate sodium 100 milliGRAM(s) Oral two times a day  phenylephrine    Infusion 2 MICROgram(s)/kG/Min (36.375 mL/Hr) IV Continuous <Continuous>  ALBUTerol    90 MICROgram(s) HFA Inhaler 2 Puff(s) Inhalation every 6 hours  ipratropium 17 MICROgram(s) HFA Inhaler 1 Puff(s) Inhalation every 6 hours  pantoprazole  Injectable 40 milliGRAM(s) IV Push every 12 hours  aspirin  chewable 81 milliGRAM(s) Oral daily  heparin  Injectable 5000 Unit(s) SubCutaneous every 8 hours  methylPREDNISolone sodium succinate Injectable 20 milliGRAM(s) IV Push two times a day  diVALproex  milliGRAM(s) Oral two times a day    MEDICATIONS  (PRN):  acetaminophen    Suspension 650 milliGRAM(s) Oral every 6 hours PRN For Temp greater than 38 C (100.4 F)  aluminum hydroxide/magnesium hydroxide/simethicone Suspension 30 milliLiter(s) Oral every 6 hours PRN Dyspepsia  acetaminophen  IVPB. 1000 milliGRAM(s) IV Intermittent once PRN Moderate Pain (4 - 6)  fentaNYL    Injectable 25 MICROGram(s) IV Push every 3 hours PRN Moderate Pain (4 - 6)    ICU Vital Signs Last 24 Hrs  T(C): 36.8 (28 Sep 2017 08:00), Max: 37.2 (27 Sep 2017 13:00)  T(F): 98.3 (28 Sep 2017 08:00), Max: 99 (27 Sep 2017 16:00)  HR: 76 (28 Sep 2017 10:42) (73 - 97)  BP: 105/64 (27 Sep 2017 17:00) (105/64 - 105/64)  BP(mean): 70 (27 Sep 2017 17:00) (70 - 70)  ABP: 122/60 (28 Sep 2017 10:00) (87/45 - 132/71)  ABP(mean): 80 (28 Sep 2017 10:00) (58 - 91)  RR: 30 (28 Sep 2017 10:42) (14 - 30)  SpO2: 100% (28 Sep 2017 10:42) (93% - 100%)    Physical exam:                                                   General:               Pt is awake, alert,                                                Neuro:                 Nonfocal, but weak                             Cardiovascular:    S1 & S2, regular                           Respiratory:         Air entry is fair and has bilateral conducted sounds                           GI:                          Soft, nondistended and nontender, Bowel sounds active                            Ext:                        No cyanosis &  mild  bilateral  edema of upper ext    I&O's Summary    27 Sep 2017 07:01  -  28 Sep 2017 07:00  --------------------------------------------------------  IN: 1359.9 mL / OUT: 2935 mL / NET: -1575.1 mL    28 Sep 2017 07:01  -  28 Sep 2017 11:02  --------------------------------------------------------  IN: 73.8 mL / OUT: 135 mL / NET: -61.2 mL    Labs:                                                                       8.1    25.41 )-----------( 123      ( 28 Sep 2017 04:50 )             24.2                            09-28    146<H>  |  101  |  19  ----------------------------<  140<H>  4.3   |  38<H>  |  1.06    Ca    8.4      28 Sep 2017 04:50  Phos  3.2     09-28  Mg     2.1     09-28                             ABG - ( 28 Sep 2017 04:50 )  pH: 7.34  /  pCO2: 76    /  pO2: 120   / HCO3: 36    / Base Excess: 13.3  /  SaO2: 98.8        Plan:  47 M PMH of sarcoidosis, HTN, COPD, and asthma who was initially admitted on 09/10 w cc of severe leg spasms X two days  In E.D. pt found to be wheezing, so he was seen by Pulmonary and started on IV Steroids for sarcoidosis  On 9/12 at 4 am, a RRT and Stroke codes were called as the patient became unresponsive.  He was transferred to MICU and then intubated for airway protection.   The patient was found to have a new infarct on MRI and also found to have a patent PFO on this admission as well. He was weaned off vent on 09/13 and transferred to the floors   There was concern patient was having paroxysmal afib, so he was started on Eliquis yesterday evening. He has received 2 doses of Eliquis on 09/19.   However, on 09/20, the patient had a coughing episode, which was not unusual for him, but during this episode he began to cough up fresh blood, about 1/2 cup of blood. He underwent Bronchoscopy with bronchoalveolar lavage of both sides and epi injection on 09/21 and was to have received further IR embolization for hemoptysis control in the next few days.  However, he developed hemoptysis again and underwent urgent LULectomy, complicated with post op bleeding.  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    Hypotension: On Eldon - Titrate to MAP>65                            Respiratory:                                                                                                                                                                                                                                                                                                                                                                                                                                                                                                                                                                                                                                                                                                                                                                                                                                                                                                      Comfortable, not in any distress.                                         Monitor chest tube output                                         Chest tube to suction                                                                 Sarcoidosis on Solumedrol. Continue bronchodilators & pulmonary toilet     Pulmonary rehab                            GI                                         NPO                                         Continue GI prophylaxis with Protonix                                         Continue Zofran / Reglan for nausea - PRN	                                                                 Renal:                                         Continue IVF                                         Monitor I/Os and electrolytes                                                 Hem/ Onc:                                                                                  Monitor chest tube output &  outputs                                         Follow CBC                            Infectious disease:    Aspergilloma - On voriconazole 200 milliGRAM(s) Oral every 12 hours      Monitor for fever / leukocytosis.                                         All surgical incision / chest tube  sites look clean                            Endocrine                                             Continue Accu-Checks with coverage    All available pertinent clinical, laboratory, radiographic, hemodynamic, echocardiographic, respiratory data, microbiologic data and chart were reviewed and analyzed frequently throughout the course of the day and night  Patient seen, examined and plan discussed with CT Surgeon / CTICU team during rounds.    Pt's status discussed with family at bedside, updated status    I have spent  45  minutes of critical care time with this pt between 7  am and  12am    Prosper Wen MD

## 2017-09-28 NOTE — PROGRESS NOTE ADULT - SUBJECTIVE AND OBJECTIVE BOX
Patient is a 47y old  Male who presents with a chief complaint of "My leg wouldn't stop shaking." (14 Sep 2017 09:59)  extubated  doing ok  no sob     Any change in ROS:     MEDICATIONS  (STANDING):  atorvastatin 80 milliGRAM(s) Oral at bedtime  chlorhexidine 4% Liquid 1 Application(s) Topical daily  polyethylene glycol 3350 17 Gram(s) Oral at bedtime  piperacillin/tazobactam IVPB. 3.375 Gram(s) IV Intermittent every 8 hours  voriconazole 200 milliGRAM(s) Oral every 12 hours  sodium chloride 0.9%. 1000 milliLiter(s) (10 mL/Hr) IV Continuous <Continuous>  docusate sodium 100 milliGRAM(s) Oral two times a day  phenylephrine    Infusion 2 MICROgram(s)/kG/Min (36.375 mL/Hr) IV Continuous <Continuous>  ALBUTerol    90 MICROgram(s) HFA Inhaler 2 Puff(s) Inhalation every 6 hours  ipratropium 17 MICROgram(s) HFA Inhaler 1 Puff(s) Inhalation every 6 hours  pantoprazole  Injectable 40 milliGRAM(s) IV Push every 12 hours  aspirin  chewable 81 milliGRAM(s) Oral daily  heparin  Injectable 5000 Unit(s) SubCutaneous every 8 hours  methylPREDNISolone sodium succinate Injectable 20 milliGRAM(s) IV Push two times a day  diVALproex  milliGRAM(s) Oral two times a day    MEDICATIONS  (PRN):  acetaminophen    Suspension 650 milliGRAM(s) Oral every 6 hours PRN For Temp greater than 38 C (100.4 F)  aluminum hydroxide/magnesium hydroxide/simethicone Suspension 30 milliLiter(s) Oral every 6 hours PRN Dyspepsia  acetaminophen  IVPB. 1000 milliGRAM(s) IV Intermittent once PRN Moderate Pain (4 - 6)  fentaNYL    Injectable 25 MICROGram(s) IV Push every 3 hours PRN Moderate Pain (4 - 6)    Vital Signs Last 24 Hrs  T(C): 36.7 (28 Sep 2017 12:00), Max: 37.2 (27 Sep 2017 16:00)  T(F): 98.1 (28 Sep 2017 12:00), Max: 99 (27 Sep 2017 16:00)  HR: 64 (28 Sep 2017 12:00) (64 - 97)  BP: 105/64 (27 Sep 2017 17:00) (105/64 - 105/64)  BP(mean): 70 (27 Sep 2017 17:00) (70 - 70)  RR: 17 (28 Sep 2017 12:00) (16 - 30)  SpO2: 99% (28 Sep 2017 12:00) (93% - 100%)  Mode: standby    I&O's Summary    27 Sep 2017 07:01  -  28 Sep 2017 07:00  --------------------------------------------------------  IN: 1359.9 mL / OUT: 2935 mL / NET: -1575.1 mL    28 Sep 2017 07:01  -  28 Sep 2017 13:23  --------------------------------------------------------  IN: 119.3 mL / OUT: 360 mL / NET: -240.7 mL          Physical Exam:   GENERAL: NAD, well-groomed, well-developed  HEENT: GIOVANI/   Atraumatic, Normocephalic  ENMT: No tonsillar erythema, exudates, or enlargement; Moist mucous membranes, Good dentition, No lesions  NECK: Supple, No JVD, Normal thyroid  CHEST/LUNG: wheezing improved   CVS: Regular rate and rhythm; No murmurs, rubs, or gallops  GI: : Soft, Nontender, Nondistended; Bowel sounds present  NERVOUS SYSTEM:  Alert & Oriented X3  EXTREMITIES:  2+ Peripheral Pulses, No clubbing, cyanosis, or edema  LYMPH: No lymphadenopathy noted  SKIN: No rashes or lesions  ENDOCRINOLOGY: No Thyromegaly  PSYCH: Appropriate    Labs:  ABG - ( 28 Sep 2017 11:16 )  pH: 7.40  /  pCO2: 64    /  pO2: 132   / HCO3: 36    / Base Excess: 13.0  /  SaO2: 99.1            28                            8.1    25.41 )-----------( 123      ( 28 Sep 2017 04:50 )             24.2                         8.1    23.52 )-----------( 79       ( 27 Sep 2017 02:20 )             23.5                         7.9    20.79 )-----------( 79       ( 26 Sep 2017 17:30 )             23.4                         9.0    25.39 )-----------( 86       ( 26 Sep 2017 12:12 )             25.6                         10.8   28.86 )-----------( 74       ( 26 Sep 2017 03:00 )             30.3                         10.1   26.03 )-----------( 72       ( 25 Sep 2017 21:40 )             28.9                         9.8    23.87 )-----------( 57       ( 25 Sep 2017 17:00 )             29.4                         8.3    30.17 )-----------( 93       ( 25 Sep 2017 11:45 )             25.3     09-28    146<H>  |  101  |  19  ----------------------------<  140<H>  4.3   |  38<H>  |  1.06  09-27    141  |  100  |  15  ----------------------------<  152<H>  4.0   |  31  |  1.31<H>  09-26    151<H>  |  110<H>  |  16  ----------------------------<  129<H>  4.8   |  29  |  1.45<H>  09-26    141  |  104  |  17  ----------------------------<  129<H>  5.0   |  29  |  1.44<H>  09-26    141  |  105  |  17  ----------------------------<  141<H>  5.9<H>   |  28  |  1.22  09-25    138  |  105  |  18  ----------------------------<  174<H>  5.7<H>   |  27  |  1.09  09-25    141  |  104  |  19  ----------------------------<  177<H>  5.5<H>   |  27  |  1.25  09-25    140  |  102  |  16  ----------------------------<  128<H>  5.1   |  26  |  1.10  09-24    140  |  102  |  21  ----------------------------<  105<H>  4.6   |  27  |  0.98    Ca    8.4      28 Sep 2017 04:50  Ca    7.9<L>      27 Sep 2017 02:20  Ca    7.7<L>      26 Sep 2017 17:30  Phos  3.2     09-28  Phos  2.3     09-27  Phos  1.3     09-26  Mg     2.1     09-28  Mg     2.1     09-27  Mg     2.0     09-26    TPro  4.8<L>  /  Alb  2.7<L>  /  TBili  1.3<H>  /  DBili  x   /  AST  43<H>  /  ALT  19  /  AlkPhos  31<L>  09-26  TPro  4.1<L>  /  Alb  2.3<L>  /  TBili  1.9<H>  /  DBili  x   /  AST  35  /  ALT  16  /  AlkPhos  28<L>  09-25  TPro  4.9<L>  /  Alb  2.9<L>  /  TBili  1.2  /  DBili  x   /  AST  31  /  ALT  18  /  AlkPhos  31<L>  09-25  TPro  5.6<L>  /  Alb  2.9<L>  /  TBili  1.3<H>  /  DBili  x   /  AST  35  /  ALT  23  /  AlkPhos  36<L>  09-24    CAPILLARY BLOOD GLUCOSE                Lactate, Blood: 1.9 mmol/L (09-24 @ 16:00)  Cultures:               < from: Xray Chest 1 View AP -PORTABLE-Routine (09.28.17 @ 06:27) >  ilateral pleural effusions are again noted.  Emphysema is again seen.  There is increasing opacity in the left upper to midlung.  No pneumothorax is seen.                  IMPRESSION:  Increasing left upper to midlung opacity which could be   secondary to atelectasis and/or pneumonia. Other pathology is not   excluded.    Line and tubes as above.    Small pleural effusions.                  KEERTHI FIELDS M.D., ATTENDING RADIOLOGIST  This document has been electronically signed. Sep 28 2017 10:27AM        < end of copied text >                Studies  Chest X-RAY  CT SCAN Chest   Venous Dopplers: LE:   CT Abdomen  Others

## 2017-09-29 ENCOUNTER — TRANSCRIPTION ENCOUNTER (OUTPATIENT)
Age: 47
End: 2017-09-29

## 2017-09-29 LAB
BUN SERPL-MCNC: 22 MG/DL — SIGNIFICANT CHANGE UP (ref 7–23)
CALCIUM SERPL-MCNC: 8.6 MG/DL — SIGNIFICANT CHANGE UP (ref 8.4–10.5)
CHLORIDE SERPL-SCNC: 100 MMOL/L — SIGNIFICANT CHANGE UP (ref 98–107)
CO2 SERPL-SCNC: 37 MMOL/L — HIGH (ref 22–31)
CREAT SERPL-MCNC: 0.92 MG/DL — SIGNIFICANT CHANGE UP (ref 0.5–1.3)
GLUCOSE SERPL-MCNC: 134 MG/DL — HIGH (ref 70–99)
HCT VFR BLD CALC: 22.3 % — LOW (ref 39–50)
HGB BLD-MCNC: 7.1 G/DL — LOW (ref 13–17)
MAGNESIUM SERPL-MCNC: 2.2 MG/DL — SIGNIFICANT CHANGE UP (ref 1.6–2.6)
MCHC RBC-ENTMCNC: 29.7 PG — SIGNIFICANT CHANGE UP (ref 27–34)
MCHC RBC-ENTMCNC: 31.8 % — LOW (ref 32–36)
MCV RBC AUTO: 93.3 FL — SIGNIFICANT CHANGE UP (ref 80–100)
NRBC # FLD: 0 — SIGNIFICANT CHANGE UP
PHOSPHATE SERPL-MCNC: 2.4 MG/DL — LOW (ref 2.5–4.5)
PLATELET # BLD AUTO: 107 K/UL — LOW (ref 150–400)
PMV BLD: 12.7 FL — SIGNIFICANT CHANGE UP (ref 7–13)
POTASSIUM SERPL-MCNC: 4.3 MMOL/L — SIGNIFICANT CHANGE UP (ref 3.5–5.3)
POTASSIUM SERPL-SCNC: 4.3 MMOL/L — SIGNIFICANT CHANGE UP (ref 3.5–5.3)
RBC # BLD: 2.39 M/UL — LOW (ref 4.2–5.8)
RBC # FLD: 15.3 % — HIGH (ref 10.3–14.5)
SODIUM SERPL-SCNC: 143 MMOL/L — SIGNIFICANT CHANGE UP (ref 135–145)
WBC # BLD: 12.18 K/UL — HIGH (ref 3.8–10.5)
WBC # FLD AUTO: 12.18 K/UL — HIGH (ref 3.8–10.5)

## 2017-09-29 PROCEDURE — 99233 SBSQ HOSP IP/OBS HIGH 50: CPT

## 2017-09-29 PROCEDURE — 71010: CPT | Mod: 26

## 2017-09-29 PROCEDURE — 99232 SBSQ HOSP IP/OBS MODERATE 35: CPT

## 2017-09-29 RX ADMIN — Medication 1 PUFF(S): at 09:51

## 2017-09-29 RX ADMIN — Medication 1 PUFF(S): at 22:11

## 2017-09-29 RX ADMIN — PIPERACILLIN AND TAZOBACTAM 25 GRAM(S): 4; .5 INJECTION, POWDER, LYOPHILIZED, FOR SOLUTION INTRAVENOUS at 05:29

## 2017-09-29 RX ADMIN — Medication 20 MILLIGRAM(S): at 05:28

## 2017-09-29 RX ADMIN — DIVALPROEX SODIUM 500 MILLIGRAM(S): 500 TABLET, DELAYED RELEASE ORAL at 05:28

## 2017-09-29 RX ADMIN — Medication 1 PUFF(S): at 05:19

## 2017-09-29 RX ADMIN — CHLORHEXIDINE GLUCONATE 1 APPLICATION(S): 213 SOLUTION TOPICAL at 07:00

## 2017-09-29 RX ADMIN — ALBUTEROL 2 PUFF(S): 90 AEROSOL, METERED ORAL at 09:51

## 2017-09-29 RX ADMIN — HEPARIN SODIUM 5000 UNIT(S): 5000 INJECTION INTRAVENOUS; SUBCUTANEOUS at 22:27

## 2017-09-29 RX ADMIN — PIPERACILLIN AND TAZOBACTAM 25 GRAM(S): 4; .5 INJECTION, POWDER, LYOPHILIZED, FOR SOLUTION INTRAVENOUS at 22:00

## 2017-09-29 RX ADMIN — POLYETHYLENE GLYCOL 3350 17 GRAM(S): 17 POWDER, FOR SOLUTION ORAL at 22:27

## 2017-09-29 RX ADMIN — Medication 100 MILLIGRAM(S): at 18:12

## 2017-09-29 RX ADMIN — Medication 1000 MILLIGRAM(S): at 12:45

## 2017-09-29 RX ADMIN — ALBUTEROL 2 PUFF(S): 90 AEROSOL, METERED ORAL at 05:18

## 2017-09-29 RX ADMIN — ALBUTEROL 2 PUFF(S): 90 AEROSOL, METERED ORAL at 22:11

## 2017-09-29 RX ADMIN — Medication 81 MILLIGRAM(S): at 12:30

## 2017-09-29 RX ADMIN — ATORVASTATIN CALCIUM 80 MILLIGRAM(S): 80 TABLET, FILM COATED ORAL at 22:27

## 2017-09-29 RX ADMIN — Medication 400 MILLIGRAM(S): at 12:30

## 2017-09-29 RX ADMIN — PANTOPRAZOLE SODIUM 40 MILLIGRAM(S): 20 TABLET, DELAYED RELEASE ORAL at 05:29

## 2017-09-29 RX ADMIN — Medication 1 PUFF(S): at 16:01

## 2017-09-29 RX ADMIN — PANTOPRAZOLE SODIUM 40 MILLIGRAM(S): 20 TABLET, DELAYED RELEASE ORAL at 18:12

## 2017-09-29 RX ADMIN — HEPARIN SODIUM 5000 UNIT(S): 5000 INJECTION INTRAVENOUS; SUBCUTANEOUS at 12:30

## 2017-09-29 RX ADMIN — HEPARIN SODIUM 5000 UNIT(S): 5000 INJECTION INTRAVENOUS; SUBCUTANEOUS at 05:29

## 2017-09-29 RX ADMIN — Medication 100 MILLIGRAM(S): at 05:29

## 2017-09-29 RX ADMIN — ALBUTEROL 2 PUFF(S): 90 AEROSOL, METERED ORAL at 16:01

## 2017-09-29 RX ADMIN — PIPERACILLIN AND TAZOBACTAM 25 GRAM(S): 4; .5 INJECTION, POWDER, LYOPHILIZED, FOR SOLUTION INTRAVENOUS at 14:00

## 2017-09-29 RX ADMIN — DIVALPROEX SODIUM 500 MILLIGRAM(S): 500 TABLET, DELAYED RELEASE ORAL at 18:12

## 2017-09-29 RX ADMIN — VORICONAZOLE 200 MILLIGRAM(S): 10 INJECTION, POWDER, LYOPHILIZED, FOR SOLUTION INTRAVENOUS at 05:28

## 2017-09-29 RX ADMIN — VORICONAZOLE 200 MILLIGRAM(S): 10 INJECTION, POWDER, LYOPHILIZED, FOR SOLUTION INTRAVENOUS at 18:12

## 2017-09-29 NOTE — PROGRESS NOTE ADULT - SUBJECTIVE AND OBJECTIVE BOX
Follow Up: sarcoid,  mycetoma resection 9/24    Interval History/ROS:  s/p RTOR 9/25 for bleeding now in bedside chair on nasal O2    Allergies  No Known Allergies    piperacillin/tazobactam IVPB. 3.375 every 8 hours  voriconazole 200 every 12 hours      MEDICATIONS  (STANDING):  atorvastatin 80 at bedtime  acetaminophen    Suspension 650 every 6 hours PRN  polyethylene glycol 3350 17 at bedtime  aluminum hydroxide/magnesium hydroxide/simethicone Suspension 30 every 6 hours PRN  acetaminophen  IVPB. 1000 once PRN  docusate sodium 100 two times a day  phenylephrine    Infusion 2 <Continuous>  ALBUTerol    90 MICROgram(s) HFA Inhaler 2 every 6 hours  ipratropium 17 MICROgram(s) HFA Inhaler 1 every 6 hours  pantoprazole  Injectable 40 every 12 hours  aspirin  chewable 81 daily  heparin  Injectable 5000 every 8 hours  fentaNYL    Injectable 25 every 3 hours PRN  diVALproex  two times a day  methylPREDNISolone sodium succinate Injectable 20 daily  >      Vital Signs Last 24 Hrs  T(F): 98.4 (09-29-17 @ 04:00), Max: 98.8 (09-28-17 @ 16:00)  HR: 84 (09-29-17 @ 14:00)  BP: 119/69 (09-28-17 @ 18:00)  RR: 21 (09-29-17 @ 14:00)  SpO2: 99% (09-29-17 @ 14:00) (95% - 100%)    PHYSICAL EXAM:  General: awake, responsive in chair nasal O2  Respiratory: Clear to auscultation bilaterally; left chest tube x 2 to drainage: serous sanguinous drainage  CV: RRR, S1S2, no murmurs, rubs or gallops  Abdominal: Soft, Non-tender, non-distended, normal bowel sounds  Pickens cath  Extremities: trace LE edema  Line Sites: left subclavian  Skin: No rash                                      7.1    12.18 )-----------( 107      ( 29 Sep 2017 04:30 )             22.3 09-29    143  |  100  |  22  ----------------------------<  134  4.3   |  37  |  0.92  Ca    8.6      29 Sep 2017 04:30Phos  2.4     09-29Mg     2.2     09-29        MICROBIOLOGY:    Culture - Yeast and Fungus (09.24.17 @ 15:02)    Culture - Yeast and Fungus:   CULTURE NEGATIVE FOR YEASTS AND MOLDS AFTER 1 DAY  CULTURE NEGATIVE FOR YEASTS AND MOLDS AFTER 2 DAYS    Specimen Source: OTHER    Culture - Acid Fast Smear Concentrated (09.24.17 @ 15:02)    Culture - Acid Fast Smear Concentrated:   AFB SMEAR= NO ACID FAST BACILLI SEEN    Specimen Source: OTHER    Culture - Respiratory with Gram Stain (09.24.17 @ 15:02)    Culture - Respiratory:   NO ORGANISMS ISOLATED AT 24 HOURS  NO ORGANISMS ISOLATED AT 48 HRS.    Gram Stain Sputum:   NOS^No Organisms Seen  WBC^White Blood Cells  QNTY CELLS IN GRAM STAIN: FEW (2+)    Specimen Source: OTHER    Culture - Yeast and Fungus (09.21.17 @ 18:21)    Culture - Yeast and Fungus:   CULTURE NEGATIVE FOR YEASTS AND MOLDS   AFTER 3 DAYS    Specimen Source: BRONCHIAL LAVAGE

## 2017-09-29 NOTE — PROGRESS NOTE ADULT - PROBLEM SELECTOR PLAN 3
wheezing is present :pk pressures in 30's : started him on steroids again  bronchodilators ATC:  9/27; if he continues to show improvement: can decrease his solumedrol tomorrow  9/28: decreases his IV steroids to 20 mg once a day for a few days more  9/29: on 20 mg per day of solumedrol: would continue for few more days and then decrease it

## 2017-09-29 NOTE — PROGRESS NOTE ADULT - ASSESSMENT
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 Extubated, improving and remains on empiric Zosyn and Voriconazole. OR cultures normal respiratory gracie, fungal and AFB cultures testing.  Path testing as well.     Would continue voriconazole and Zosyn.  If path +/or culture suggest invasive aspergillosis would continue voriconazole at least 6 weeks.    I will be away until 10/9.  ID service will follow.

## 2017-09-29 NOTE — PROGRESS NOTE ADULT - PROBLEM SELECTOR PLAN 1
S/P surgery : reop twice secondary to hemothroax: curently on pressors and ventilator support: He is wheezing extensively: started on IV steroids 20 mg twice a day , but should be given only for few days : follow his wheezing daily as soon as it is resolved; dc steroids For now their is no hemoptysis: He had twice reop yesterday secondary to hemothorax and bleeding from chest tube: currently on pressors and seated and on broad spectrum antibiotics.  9/27: he seems to be doing better today: for extubation  9/28: extubated: alert and awake and responding: He is on zosyn any way with voriconazole!  9/29: chest x-ray today , to me looks better then yesterday: There is no official report yet:

## 2017-09-29 NOTE — PROGRESS NOTE ADULT - SUBJECTIVE AND OBJECTIVE BOX
47 M PMH of sarcoidosis, HTN, COPD, and asthma who was initially admitted on 09/10 w cc of severe leg spasms X two days  In E.D. pt found to be wheezing, so he was seen by Pulmonary and started on IV Steroids for sarcoidosis  On 9/12 at 4 am, a RRT and Stroke codes were called as the patient became unresponsive.  He was transferred to MICU and then intubated for airway protection.   The patient was found to have a new infarct on MRI and also found to have a patent PFO on this admission as well. He was weaned off vent on 09/13 and transferred to the floors   There was concern patient was having paroxysmal afib, so he was started on Eliquis yesterday evening. He has received 2 doses of Eliquis on 09/19.   However, on 09/20, the patient had a coughing episode, which was not unusual for him, but during this episode he began to cough up fresh blood, about 1/2 cup of blood. He underwent Bronchoscopy with bronchoalveolar lavage of both sides and epi injection on 09/21 and was to have received further IR embolization for hemoptysis control in the next few days.  However, he developed hemoptysis again and underwent urgent LULectomy, complicated with post op bleeding.  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              Sarcoidosis on Prednisone              PFO              Pulm edema, possible TRALI              s/p massive transfusion          Allergies:  	No Known Allergies:     Home Medications:   * Incomplete Medication History as of 11-Sep-2017 09:23 documented in Structured Notes  · 	Azithromycin 5 Day Dose Pack 250 mg oral tablet: 1 dose(s) orally once a day for respiratory infection, Last Dose Taken:    · 	predniSONE 50 mg oral tablet: 1 tab(s) orally once a day , Last Dose Taken:    · 	albuterol 1.25 mg/3 mL (0.042%) inhalation solution: 3 milliliter(s) inhaled 3 times a day, Last Dose Taken:    · 	losartan 25 mg oral tablet: 1 tab(s) orally 2 times a day, Last Dose Taken:    · 	Symbicort 160 mcg-4.5 mcg/inh inhalation aerosol: 2 puff(s) inhaled 2 times a day, Last Dose Taken:    · 	Incruse Ellipta: 1 puff(s) inhaled once a day, Last Dose Taken:      MEDICATIONS  (STANDING):  atorvastatin 80 milliGRAM(s) Oral at bedtime  chlorhexidine 4% Liquid 1 Application(s) Topical daily  polyethylene glycol 3350 17 Gram(s) Oral at bedtime  piperacillin/tazobactam IVPB. 3.375 Gram(s) IV Intermittent every 8 hours  voriconazole 200 milliGRAM(s) Oral every 12 hours  sodium chloride 0.9%. 1000 milliLiter(s) (10 mL/Hr) IV Continuous <Continuous>  docusate sodium 100 milliGRAM(s) Oral two times a day  phenylephrine    Infusion 2 MICROgram(s)/kG/Min (36.375 mL/Hr) IV Continuous <Continuous>  ALBUTerol    90 MICROgram(s) HFA Inhaler 2 Puff(s) Inhalation every 6 hours  ipratropium 17 MICROgram(s) HFA Inhaler 1 Puff(s) Inhalation every 6 hours  pantoprazole  Injectable 40 milliGRAM(s) IV Push every 12 hours  aspirin  chewable 81 milliGRAM(s) Oral daily  heparin  Injectable 5000 Unit(s) SubCutaneous every 8 hours  diVALproex  milliGRAM(s) Oral two times a day  methylPREDNISolone sodium succinate Injectable 20 milliGRAM(s) IV Push daily    MEDICATIONS  (PRN):  acetaminophen    Suspension 650 milliGRAM(s) Oral every 6 hours PRN For Temp greater than 38 C (100.4 F)  aluminum hydroxide/magnesium hydroxide/simethicone Suspension 30 milliLiter(s) Oral every 6 hours PRN Dyspepsia  acetaminophen  IVPB. 1000 milliGRAM(s) IV Intermittent once PRN Moderate Pain (4 - 6)  fentaNYL    Injectable 25 MICROGram(s) IV Push every 3 hours PRN Moderate Pain (4 - 6)    ICU Vital Signs Last 24 Hrs  T(C): 36.9 (29 Sep 2017 04:00), Max: 37.1 (28 Sep 2017 16:00)  T(F): 98.4 (29 Sep 2017 04:00), Max: 98.8 (28 Sep 2017 16:00)  HR: 72 (29 Sep 2017 06:00) (64 - 92)  BP: 119/69 (28 Sep 2017 18:00) (119/69 - 119/69)  BP(mean): 79 (28 Sep 2017 18:00) (79 - 79)  ABP: 128/64 (29 Sep 2017 06:00) (99/49 - 128/64)  ABP(mean): 82 (29 Sep 2017 06:00) (63 - 84)  RR: 20 (29 Sep 2017 06:00) (17 - 39)  SpO2: 98% (29 Sep 2017 06:00) (95% - 100%)    Physical exam:                                                   General:               Pt is awake, alert,                                                Neuro:                 focal,  weak   L>R                          Cardiovascular:    S1 & S2, regular                           Respiratory:         Air entry is fair and has bilateral conducted sounds                           GI:                          Soft, nondistended and nontender, Bowel sounds active                            Ext:                        No cyanosis &  mild  bilateral  edema of upper ext      I&O's Summary    27 Sep 2017 07:01  -  28 Sep 2017 07:00  --------------------------------------------------------  IN: 1359.9 mL / OUT: 2935 mL / NET: -1575.1 mL    28 Sep 2017 07:01  -  29 Sep 2017 06:24  --------------------------------------------------------  IN: 473 mL / OUT: 1385 mL / NET: -912 mL    Labs:                                                                       7.1    12.18 )-----------( 107      ( 29 Sep 2017 04:30 )             22.3                            09-29  143  |  100  |  22  ----------------------------<  134<H>  4.3   |  37<H>  |  0.92    Ca    8.6      29 Sep 2017 04:30  Phos  2.4     09-29  Mg     2.2     09-29    ABG - ( 28 Sep 2017 11:16 )  pH: 7.40  /  pCO2: 64    /  pO2: 132   / HCO3: 36    / Base Excess: 13.0  /  SaO2: 99.1      Plan:  47 M PMH of sarcoidosis, HTN, COPD, and asthma who was initially admitted on 09/10 w cc of severe leg spasms X two days  In E.D. pt found to be wheezing, so he was seen by Pulmonary and started on IV Steroids for sarcoidosis  On 9/12 at 4 am, a RRT and Stroke codes were called as the patient became unresponsive.  He was transferred to MICU and then intubated for airway protection.   The patient was found to have a new infarct on MRI and also found to have a patent PFO on this admission as well. He was weaned off vent on 09/13 and transferred to the floors   There was concern patient was having paroxysmal afib, so he was started on Eliquis yesterday evening. He has received 2 doses of Eliquis on 09/19.   However, on 09/20, the patient had a coughing episode, which was not unusual for him, but during this episode he began to cough up fresh blood, about 1/2 cup of blood. He underwent Bronchoscopy with bronchoalveolar lavage of both sides and epi injection on 09/21 and was to have received further IR embolization for hemoptysis control in the next few days.  However, he developed hemoptysis again and underwent urgent LULectomy, complicated with post op bleeding.  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    Hypotension: On [place] - Titrate to MAP>65                            Respiratory:                                                                                                                                                                                                                                                                                                                                                                                                                                                                                                                                                                                                                                                                                                                                                                                                                                                                                                      Comfortable, not in any distress.                                         Monitor chest tube output                                         Chest tube to suction                                                                 Sarcoidosis on Solumedrol. Continue bronchodilators & pulmonary toilet     Pulmonary rehab                          GI                                         NPO                                         Continue GI prophylaxis with Protonix                                         Continue Zofran / Reglan for nausea - PRN	                                                                 Renal:                                         Continue IVF                                         Monitor I/Os and electrolytes                                                 Hem/ Onc:                                                                                  Monitor chest tube output &  outputs                                         Follow CBC                            Infectious disease:    Aspergilloma - On voriconazole 200 milliGRAM(s) Oral every 12 hours      Monitor for fever / leukocytosis.                                         All surgical incision / chest tube  sites look clean                            Endocrine                                             Continue Accu-Checks with coverage    All available pertinent clinical, laboratory, radiographic, hemodynamic, echocardiographic, respiratory data, microbiologic data and chart were reviewed and analyzed   Patient seen, examined and plan discussed with CT Surgeon / CTICU team during rounds.    Prosper Wen MD

## 2017-09-29 NOTE — PROGRESS NOTE ADULT - SUBJECTIVE AND OBJECTIVE BOX
Patient is a 47y old  Male who presents with a chief complaint of "My leg wouldn't stop shaking." (14 Sep 2017 09:59)  sitting on chair  no hemoptysis  not wheezing  in no resp distress    Any change in ROS:     MEDICATIONS  (STANDING):  atorvastatin 80 milliGRAM(s) Oral at bedtime  chlorhexidine 4% Liquid 1 Application(s) Topical daily  polyethylene glycol 3350 17 Gram(s) Oral at bedtime  piperacillin/tazobactam IVPB. 3.375 Gram(s) IV Intermittent every 8 hours  voriconazole 200 milliGRAM(s) Oral every 12 hours  sodium chloride 0.9%. 1000 milliLiter(s) (10 mL/Hr) IV Continuous <Continuous>  docusate sodium 100 milliGRAM(s) Oral two times a day  phenylephrine    Infusion 2 MICROgram(s)/kG/Min (36.375 mL/Hr) IV Continuous <Continuous>  ALBUTerol    90 MICROgram(s) HFA Inhaler 2 Puff(s) Inhalation every 6 hours  ipratropium 17 MICROgram(s) HFA Inhaler 1 Puff(s) Inhalation every 6 hours  pantoprazole  Injectable 40 milliGRAM(s) IV Push every 12 hours  aspirin  chewable 81 milliGRAM(s) Oral daily  heparin  Injectable 5000 Unit(s) SubCutaneous every 8 hours  diVALproex  milliGRAM(s) Oral two times a day  methylPREDNISolone sodium succinate Injectable 20 milliGRAM(s) IV Push daily    MEDICATIONS  (PRN):  acetaminophen    Suspension 650 milliGRAM(s) Oral every 6 hours PRN For Temp greater than 38 C (100.4 F)  aluminum hydroxide/magnesium hydroxide/simethicone Suspension 30 milliLiter(s) Oral every 6 hours PRN Dyspepsia  acetaminophen  IVPB. 1000 milliGRAM(s) IV Intermittent once PRN Moderate Pain (4 - 6)  fentaNYL    Injectable 25 MICROGram(s) IV Push every 3 hours PRN Moderate Pain (4 - 6)    Vital Signs Last 24 Hrs  T(C): 36.9 (29 Sep 2017 04:00), Max: 37.1 (28 Sep 2017 16:00)  T(F): 98.4 (29 Sep 2017 04:00), Max: 98.8 (28 Sep 2017 16:00)  HR: 75 (29 Sep 2017 09:51) (68 - 92)  BP: 119/69 (28 Sep 2017 18:00) (119/69 - 119/69)  BP(mean): 79 (28 Sep 2017 18:00) (79 - 79)  RR: 19 (29 Sep 2017 08:00) (17 - 39)  SpO2: 100% (29 Sep 2017 09:51) (95% - 100%)    I&O's Summary    28 Sep 2017 07:01  -  29 Sep 2017 07:00  --------------------------------------------------------  IN: 483 mL / OUT: 1425 mL / NET: -942 mL          Physical Exam:   GENERAL: NAD, well-groomed, well-developed  HEENT: GIOVANI/   Atraumatic, Normocephalic  ENMT: No tonsillar erythema, exudates, or enlargement; Moist mucous membranes, Good dentition, No lesions  NECK: Supple, No JVD, Normal thyroid  CHEST/LUNG: coarse rhonchi  CVS: Regular rate and rhythm; No murmurs, rubs, or gallops  GI: : Soft, Nontender, Nondistended; Bowel sounds present  NERVOUS SYSTEM:  Alert & Oriented X3  EXTREMITIES:  2+ Peripheral Pulses, No clubbing, cyanosis, or edema  LYMPH: No lymphadenopathy noted  SKIN: No rashes or lesions  ENDOCRINOLOGY: No Thyromegaly  PSYCH: Appropriate    Labs:  ABG - ( 28 Sep 2017 11:16 )  pH: 7.40  /  pCO2: 64    /  pO2: 132   / HCO3: 36    / Base Excess: 13.0  /  SaO2: 99.1                          7.1    12.18 )-----------( 107      ( 29 Sep 2017 04:30 )             22.3                         8.1    25.41 )-----------( 123      ( 28 Sep 2017 04:50 )             24.2                         8.1    23.52 )-----------( 79       ( 27 Sep 2017 02:20 )             23.5                         7.9    20.79 )-----------( 79       ( 26 Sep 2017 17:30 )             23.4                         9.0    25.39 )-----------( 86       ( 26 Sep 2017 12:12 )             25.6                         10.8   28.86 )-----------( 74       ( 26 Sep 2017 03:00 )             30.3                         10.1   26.03 )-----------( 72       ( 25 Sep 2017 21:40 )             28.9                         9.8    23.87 )-----------( 57       ( 25 Sep 2017 17:00 )             29.4     09-29    143  |  100  |  22  ----------------------------<  134<H>  4.3   |  37<H>  |  0.92  09-28    146<H>  |  101  |  19  ----------------------------<  140<H>  4.3   |  38<H>  |  1.06  09-27    141  |  100  |  15  ----------------------------<  152<H>  4.0   |  31  |  1.31<H>  09-26    151<H>  |  110<H>  |  16  ----------------------------<  129<H>  4.8   |  29  |  1.45<H>  09-26    141  |  104  |  17  ----------------------------<  129<H>  5.0   |  29  |  1.44<H>  09-26    141  |  105  |  17  ----------------------------<  141<H>  5.9<H>   |  28  |  1.22  09-25    138  |  105  |  18  ----------------------------<  174<H>  5.7<H>   |  27  |  1.09  09-25    141  |  104  |  19  ----------------------------<  177<H>  5.5<H>   |  27  |  1.25    Ca    8.6      29 Sep 2017 04:30  Ca    8.4      28 Sep 2017 04:50  Phos  2.4     09-29  Phos  3.2     09-28  Mg     2.2     09-29  Mg     2.1     09-28    TPro  4.8<L>  /  Alb  2.7<L>  /  TBili  1.3<H>  /  DBili  x   /  AST  43<H>  /  ALT  19  /  AlkPhos  31<L>  09-26  TPro  4.1<L>  /  Alb  2.3<L>  /  TBili  1.9<H>  /  DBili  x   /  AST  35  /  ALT  16  /  AlkPhos  28<L>  09-25  TPro  4.9<L>  /  Alb  2.9<L>  /  TBili  1.2  /  DBili  x   /  AST  31  /  ALT  18  /  AlkPhos  31<L>  09-25    CAPILLARY BLOOD GLUCOSE                Cultures:       < from: Xray Chest 1 View AP -PORTABLE-Routine (09.28.17 @ 06:27) >    INTERPRETATION:     The heart is not enlarged.  Left lung postsurgical change again seen.  Left chest tubes and a left subclavian line are again noted.  Previous left IJ line, ET tube, and enteric tube have been removed.  Smallbilateral pleural effusions are again noted.  Emphysema is again seen.  There is increasing opacity in the left upper to midlung.  No pneumothorax is seen.                  IMPRESSION:  Increasing left upper to midlung opacity which could be   secondary to atelectasis and/or pneumonia. Other pathology is not   excluded.    Line and tubes as above.    Small pleural effusions.    < end of copied text >                        Studies  Chest X-RAY  CT SCAN Chest   Venous Dopplers: LE:   CT Abdomen  Others

## 2017-09-30 LAB
BUN SERPL-MCNC: 22 MG/DL — SIGNIFICANT CHANGE UP (ref 7–23)
CALCIUM SERPL-MCNC: 8.5 MG/DL — SIGNIFICANT CHANGE UP (ref 8.4–10.5)
CHLORIDE SERPL-SCNC: 101 MMOL/L — SIGNIFICANT CHANGE UP (ref 98–107)
CO2 SERPL-SCNC: 37 MMOL/L — HIGH (ref 22–31)
CREAT SERPL-MCNC: 0.98 MG/DL — SIGNIFICANT CHANGE UP (ref 0.5–1.3)
GLUCOSE SERPL-MCNC: 106 MG/DL — HIGH (ref 70–99)
HCT VFR BLD CALC: 22.5 % — LOW (ref 39–50)
HGB BLD-MCNC: 7.6 G/DL — LOW (ref 13–17)
MAGNESIUM SERPL-MCNC: 2.1 MG/DL — SIGNIFICANT CHANGE UP (ref 1.6–2.6)
MCHC RBC-ENTMCNC: 31.5 PG — SIGNIFICANT CHANGE UP (ref 27–34)
MCHC RBC-ENTMCNC: 33.8 % — SIGNIFICANT CHANGE UP (ref 32–36)
MCV RBC AUTO: 93.4 FL — SIGNIFICANT CHANGE UP (ref 80–100)
NRBC # FLD: 0.02 — SIGNIFICANT CHANGE UP
PHOSPHATE SERPL-MCNC: 2.5 MG/DL — SIGNIFICANT CHANGE UP (ref 2.5–4.5)
PLATELET # BLD AUTO: 112 K/UL — LOW (ref 150–400)
PMV BLD: 12.8 FL — SIGNIFICANT CHANGE UP (ref 7–13)
POTASSIUM SERPL-MCNC: 3.9 MMOL/L — SIGNIFICANT CHANGE UP (ref 3.5–5.3)
POTASSIUM SERPL-SCNC: 3.9 MMOL/L — SIGNIFICANT CHANGE UP (ref 3.5–5.3)
RBC # BLD: 2.41 M/UL — LOW (ref 4.2–5.8)
RBC # FLD: 14.7 % — HIGH (ref 10.3–14.5)
SODIUM SERPL-SCNC: 144 MMOL/L — SIGNIFICANT CHANGE UP (ref 135–145)
WBC # BLD: 11.39 K/UL — HIGH (ref 3.8–10.5)
WBC # FLD AUTO: 11.39 K/UL — HIGH (ref 3.8–10.5)

## 2017-09-30 PROCEDURE — 99232 SBSQ HOSP IP/OBS MODERATE 35: CPT

## 2017-09-30 PROCEDURE — 71010: CPT | Mod: 26

## 2017-09-30 PROCEDURE — 99233 SBSQ HOSP IP/OBS HIGH 50: CPT

## 2017-09-30 RX ORDER — APIXABAN 2.5 MG/1
5 TABLET, FILM COATED ORAL EVERY 12 HOURS
Qty: 0 | Refills: 0 | Status: DISCONTINUED | OUTPATIENT
Start: 2017-09-30 | End: 2017-10-07

## 2017-09-30 RX ORDER — FENTANYL CITRATE 50 UG/ML
25 INJECTION INTRAVENOUS
Qty: 0 | Refills: 0 | Status: DISCONTINUED | OUTPATIENT
Start: 2017-09-30 | End: 2017-10-01

## 2017-09-30 RX ORDER — ACETAMINOPHEN 500 MG
1000 TABLET ORAL ONCE
Qty: 0 | Refills: 0 | Status: COMPLETED | OUTPATIENT
Start: 2017-09-30 | End: 2017-10-01

## 2017-09-30 RX ADMIN — PIPERACILLIN AND TAZOBACTAM 25 GRAM(S): 4; .5 INJECTION, POWDER, LYOPHILIZED, FOR SOLUTION INTRAVENOUS at 22:20

## 2017-09-30 RX ADMIN — Medication 100 MILLIGRAM(S): at 06:52

## 2017-09-30 RX ADMIN — Medication 1 PUFF(S): at 05:19

## 2017-09-30 RX ADMIN — Medication 1 PUFF(S): at 10:43

## 2017-09-30 RX ADMIN — POLYETHYLENE GLYCOL 3350 17 GRAM(S): 17 POWDER, FOR SOLUTION ORAL at 22:20

## 2017-09-30 RX ADMIN — DIVALPROEX SODIUM 500 MILLIGRAM(S): 500 TABLET, DELAYED RELEASE ORAL at 19:30

## 2017-09-30 RX ADMIN — ALBUTEROL 2 PUFF(S): 90 AEROSOL, METERED ORAL at 15:48

## 2017-09-30 RX ADMIN — ALBUTEROL 2 PUFF(S): 90 AEROSOL, METERED ORAL at 10:44

## 2017-09-30 RX ADMIN — ATORVASTATIN CALCIUM 80 MILLIGRAM(S): 80 TABLET, FILM COATED ORAL at 22:20

## 2017-09-30 RX ADMIN — SODIUM CHLORIDE 10 MILLILITER(S): 9 INJECTION INTRAMUSCULAR; INTRAVENOUS; SUBCUTANEOUS at 20:10

## 2017-09-30 RX ADMIN — Medication 1 PUFF(S): at 21:47

## 2017-09-30 RX ADMIN — VORICONAZOLE 200 MILLIGRAM(S): 10 INJECTION, POWDER, LYOPHILIZED, FOR SOLUTION INTRAVENOUS at 19:30

## 2017-09-30 RX ADMIN — PIPERACILLIN AND TAZOBACTAM 25 GRAM(S): 4; .5 INJECTION, POWDER, LYOPHILIZED, FOR SOLUTION INTRAVENOUS at 14:06

## 2017-09-30 RX ADMIN — HEPARIN SODIUM 5000 UNIT(S): 5000 INJECTION INTRAVENOUS; SUBCUTANEOUS at 06:52

## 2017-09-30 RX ADMIN — Medication 81 MILLIGRAM(S): at 12:35

## 2017-09-30 RX ADMIN — Medication 100 MILLIGRAM(S): at 19:30

## 2017-09-30 RX ADMIN — HEPARIN SODIUM 5000 UNIT(S): 5000 INJECTION INTRAVENOUS; SUBCUTANEOUS at 14:08

## 2017-09-30 RX ADMIN — APIXABAN 5 MILLIGRAM(S): 2.5 TABLET, FILM COATED ORAL at 07:32

## 2017-09-30 RX ADMIN — PANTOPRAZOLE SODIUM 40 MILLIGRAM(S): 20 TABLET, DELAYED RELEASE ORAL at 06:56

## 2017-09-30 RX ADMIN — ALBUTEROL 2 PUFF(S): 90 AEROSOL, METERED ORAL at 05:19

## 2017-09-30 RX ADMIN — CHLORHEXIDINE GLUCONATE 1 APPLICATION(S): 213 SOLUTION TOPICAL at 08:00

## 2017-09-30 RX ADMIN — PIPERACILLIN AND TAZOBACTAM 25 GRAM(S): 4; .5 INJECTION, POWDER, LYOPHILIZED, FOR SOLUTION INTRAVENOUS at 07:10

## 2017-09-30 RX ADMIN — HEPARIN SODIUM 5000 UNIT(S): 5000 INJECTION INTRAVENOUS; SUBCUTANEOUS at 22:00

## 2017-09-30 RX ADMIN — PANTOPRAZOLE SODIUM 40 MILLIGRAM(S): 20 TABLET, DELAYED RELEASE ORAL at 19:00

## 2017-09-30 RX ADMIN — Medication 20 MILLIGRAM(S): at 06:56

## 2017-09-30 RX ADMIN — APIXABAN 5 MILLIGRAM(S): 2.5 TABLET, FILM COATED ORAL at 19:00

## 2017-09-30 RX ADMIN — DIVALPROEX SODIUM 500 MILLIGRAM(S): 500 TABLET, DELAYED RELEASE ORAL at 06:52

## 2017-09-30 RX ADMIN — ALBUTEROL 2 PUFF(S): 90 AEROSOL, METERED ORAL at 21:47

## 2017-09-30 RX ADMIN — Medication 1 PUFF(S): at 15:48

## 2017-09-30 RX ADMIN — VORICONAZOLE 200 MILLIGRAM(S): 10 INJECTION, POWDER, LYOPHILIZED, FOR SOLUTION INTRAVENOUS at 07:31

## 2017-09-30 NOTE — PROGRESS NOTE ADULT - ASSESSMENT
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 Extubated, improving and remains on empiric Zosyn and Voriconazole. OR cultures normal respiratory gracie, fungal and AFB cultures testing.  Path testing as well.     Would continue voriconazole and Zosyn.    Stop zosyn on 10/3    Continue voriconazole pending fungal culture and path.     If path +/or culture suggest invasive aspergillosis would continue voriconazole at least 6 weeks.

## 2017-09-30 NOTE — PROGRESS NOTE ADULT - PROBLEM SELECTOR PLAN 3
wheezing is present :pk pressures in 30's : started him on steroids again  bronchodilators ATC:  9/27; if he continues to show improvement: can decrease his solumedrol tomorrow  9/28: decreases his IV steroids to 20 mg once a day for a few days more  9/29: on 20 mg per day of solumedrol: would continue for few more days and then decrease it  9/30: doing ok: - wheezing: decrease his steroids to 10 mg a day from tomorrow

## 2017-09-30 NOTE — PROGRESS NOTE ADULT - SUBJECTIVE AND OBJECTIVE BOX
47 M PMH of sarcoidosis, HTN, COPD, and asthma who was initially admitted on 09/10 w cc of severe leg spasms X two days  In E.D. pt found to be wheezing, so he was seen by Pulmonary and started on IV Steroids for sarcoidosis  On 9/12 at 4 am, a RRT and Stroke codes were called as the patient became unresponsive.  He was transferred to MICU and then intubated for airway protection.   The patient was found to have a new infarct on MRI and also found to have a patent PFO on this admission as well. He was weaned off vent on 09/13 and transferred to the floors   There was concern patient was having paroxysmal afib, so he was started on Eliquis yesterday evening. He has received 2 doses of Eliquis on 09/19.   However, on 09/20, the patient had a coughing episode, which was not unusual for him, but during this episode he began to cough up fresh blood, about 1/2 cup of blood. He underwent Bronchoscopy with bronchoalveolar lavage of both sides and epi injection on 09/21 and was to have received further IR embolization for hemoptysis control in the next few days.  However, he developed hemoptysis again and underwent urgent LULectomy, complicated with post op bleeding.  POD # 6 (092417): Left VATS and DAVID lobectomy  POD # 5 ([id number]): re-op L thoracotomy, evacuation of hemothorax (600cc clot, 400cc blood) no obvious site of bleeding  POD # 5 (092517): re-op L thoracotomy, evacuation of hemothorax; RTOR x2 100cc clot; 200cc blood: bleeding posterior intercostal arteries identified and cauterized    Issues:                 S/p post op bleeding              Aspergilloma on Voriconazole              Sarcoidosis on Prednisone              PFO              Pulm edema, possible TRALI              s/p massive transfusion       Home Medications:   * Incomplete Medication History as of 11-Sep-2017 09:23 documented in Structured Notes  · 	Azithromycin 5 Day Dose Pack 250 mg oral tablet: 1 dose(s) orally once a day for respiratory infection, Last Dose Taken:    · 	predniSONE 50 mg oral tablet: 1 tab(s) orally once a day , Last Dose Taken:    · 	albuterol 1.25 mg/3 mL (0.042%) inhalation solution: 3 milliliter(s) inhaled 3 times a day, Last Dose Taken:    · 	losartan 25 mg oral tablet: 1 tab(s) orally 2 times a day, Last Dose Taken:    · 	Symbicort 160 mcg-4.5 mcg/inh inhalation aerosol: 2 puff(s) inhaled 2 times a day, Last Dose Taken:    · 	Incruse Ellipta: 1 puff(s) inhaled once a day, Last Dose Taken:      MEDICATIONS  (STANDING):  atorvastatin 80 milliGRAM(s) Oral at bedtime  chlorhexidine 4% Liquid 1 Application(s) Topical daily  polyethylene glycol 3350 17 Gram(s) Oral at bedtime  piperacillin/tazobactam IVPB. 3.375 Gram(s) IV Intermittent every 8 hours  voriconazole 200 milliGRAM(s) Oral every 12 hours  sodium chloride 0.9%. 1000 milliLiter(s) (10 mL/Hr) IV Continuous <Continuous>  docusate sodium 100 milliGRAM(s) Oral two times a day  phenylephrine    Infusion 2 MICROgram(s)/kG/Min (36.375 mL/Hr) IV Continuous <Continuous>  ALBUTerol    90 MICROgram(s) HFA Inhaler 2 Puff(s) Inhalation every 6 hours  ipratropium 17 MICROgram(s) HFA Inhaler 1 Puff(s) Inhalation every 6 hours  pantoprazole  Injectable 40 milliGRAM(s) IV Push every 12 hours  aspirin  chewable 81 milliGRAM(s) Oral daily  heparin  Injectable 5000 Unit(s) SubCutaneous every 8 hours  diVALproex  milliGRAM(s) Oral two times a day  methylPREDNISolone sodium succinate Injectable 20 milliGRAM(s) IV Push daily  apixaban 5 milliGRAM(s) Oral every 12 hours    MEDICATIONS  (PRN):  acetaminophen    Suspension 650 milliGRAM(s) Oral every 6 hours PRN For Temp greater than 38 C (100.4 F)  aluminum hydroxide/magnesium hydroxide/simethicone Suspension 30 milliLiter(s) Oral every 6 hours PRN Dyspepsia  fentaNYL    Injectable 25 MICROGram(s) IV Push every 3 hours PRN Moderate Pain (4 - 6)    ICU Vital Signs Last 24 Hrs  T(C): 36.9 (30 Sep 2017 04:00), Max: 37.2 (30 Sep 2017 00:00)  T(F): 98.4 (30 Sep 2017 04:00), Max: 99 (30 Sep 2017 00:00)  HR: 62 (30 Sep 2017 10:46) (62 - 95)  BP: 118/52 (30 Sep 2017 08:00) (118/52 - 118/52)  BP(mean): 72 (30 Sep 2017 08:00) (72 - 72)  ABP: 107/60 (30 Sep 2017 07:00) (96/54 - 131/64)  ABP(mean): 75 (30 Sep 2017 07:00) (68 - 85)  RR: 19 (30 Sep 2017 08:00) (16 - 47)  SpO2: 100% (30 Sep 2017 10:46) (20% - 100%)    Physical exam:                                                   General:               Pt is awake, alert,                                                Neuro:                 focal,  weak   L>R                          Cardiovascular:    S1 & S2, regular                           Respiratory:         Air entry is fair and has bilateral conducted sounds                           GI:                          Soft, nondistended and nontender, Bowel sounds active                            Ext:                        No cyanosis &  mild  bilateral  edema of upper ext    I&O's Summary    29 Sep 2017 07:01  -  30 Sep 2017 07:00  --------------------------------------------------------  IN: 960 mL / OUT: 1709 mL / NET: -749 mL    Labs:                                                                           7.6    11.39 )-----------( 112      ( 30 Sep 2017 04:10 )             22.5                            09-30    144  |  101  |  22  ----------------------------<  106<H>  3.9   |  37<H>  |  0.98    Ca    8.5      30 Sep 2017 04:10  Phos  2.5     09-30  Mg     2.1     09-30    Plan:  47 M PMH of sarcoidosis, HTN, COPD, and asthma who was initially admitted on 09/10 w cc of severe leg spasms X two days  In E.D. pt found to be wheezing, so he was seen by Pulmonary and started on IV Steroids for sarcoidosis  On 9/12 at 4 am, a RRT and Stroke codes were called as the patient became unresponsive.  He was transferred to MICU and then intubated for airway protection.   The patient was found to have a new infarct on MRI and also found to have a patent PFO on this admission as well. He was weaned off vent on 09/13 and transferred to the floors   There was concern patient was having paroxysmal afib, so he was started on Eliquis yesterday evening. He has received 2 doses of Eliquis on 09/19.   However, on 09/20, the patient had a coughing episode, which was not unusual for him, but during this episode he began to cough up fresh blood, about 1/2 cup of blood. He underwent Bronchoscopy with bronchoalveolar lavage of both sides and epi injection on 09/21 and was to have received further IR embolization for hemoptysis control in the next few days.  However, he developed hemoptysis again and underwent urgent LULectomy, complicated with post op bleeding.  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    Hypotension: On [place] - Titrate to MAP>65                            Respiratory:                                                                                                                                                                                                                                                                                                                                                                                                                                                                                                                                                                                                                                                                                                                                                                                                                                                                                                      Comfortable, not in any distress.                                         Monitor chest tube output                                         Chest tube to suction                                                                 Sarcoidosis on Solumedrol. Continue bronchodilators & pulmonary toilet     Pulmonary rehab                          GI                                         NPO                                         Continue GI prophylaxis with Protonix                                         Continue Zofran / Reglan for nausea - PRN	                                                                 Renal:                                         Continue IVF                                         Monitor I/Os and electrolytes                                                 Hem/ Onc:                                                                                  Monitor chest tube output &  outputs                                         Follow CBC                            Infectious disease:    Aspergilloma - On voriconazole 200 milliGRAM(s) Oral every 12 hours      Monitor for fever / leukocytosis.                                         All surgical incision / chest tube  sites look clean                            Endocrine                                             Continue Accu-Checks with coverage    All available pertinent clinical, laboratory, radiographic, hemodynamic, echocardiographic, respiratory data, microbiologic data and chart were reviewed and analyzed   Patient seen, examined and plan discussed with CT Surgeon / CTICU team during rounds.    Prosper Wen MD

## 2017-09-30 NOTE — PROGRESS NOTE ADULT - SUBJECTIVE AND OBJECTIVE BOX
Follow Up:      Inverval History/ROS:Patient is a 47y old  Male who presents with a chief complaint of "My leg wouldn't stop shaking." (14 Sep 2017 09:59)  OOb to chair.  No fever.  No events      Allergies    No Known Allergies    Intolerances        ANTIMICROBIALS:  piperacillin/tazobactam IVPB. 3.375 every 8 hours  voriconazole 200 every 12 hours      OTHER MEDS:  atorvastatin 80 milliGRAM(s) Oral at bedtime  acetaminophen    Suspension 650 milliGRAM(s) Oral every 6 hours PRN  chlorhexidine 4% Liquid 1 Application(s) Topical daily  polyethylene glycol 3350 17 Gram(s) Oral at bedtime  aluminum hydroxide/magnesium hydroxide/simethicone Suspension 30 milliLiter(s) Oral every 6 hours PRN  sodium chloride 0.9%. 1000 milliLiter(s) IV Continuous <Continuous>  docusate sodium 100 milliGRAM(s) Oral two times a day  phenylephrine    Infusion 2 MICROgram(s)/kG/Min IV Continuous <Continuous>  ALBUTerol    90 MICROgram(s) HFA Inhaler 2 Puff(s) Inhalation every 6 hours  ipratropium 17 MICROgram(s) HFA Inhaler 1 Puff(s) Inhalation every 6 hours  pantoprazole  Injectable 40 milliGRAM(s) IV Push every 12 hours  aspirin  chewable 81 milliGRAM(s) Oral daily  heparin  Injectable 5000 Unit(s) SubCutaneous every 8 hours  fentaNYL    Injectable 25 MICROGram(s) IV Push every 3 hours PRN  diVALproex  milliGRAM(s) Oral two times a day  methylPREDNISolone sodium succinate Injectable 20 milliGRAM(s) IV Push daily  apixaban 5 milliGRAM(s) Oral every 12 hours      Vital Signs Last 24 Hrs  T(C): 36.9 (30 Sep 2017 04:00), Max: 37.2 (30 Sep 2017 00:00)  T(F): 98.4 (30 Sep 2017 04:00), Max: 99 (30 Sep 2017 00:00)  HR: 89 (30 Sep 2017 08:00) (69 - 95)  BP: 118/52 (30 Sep 2017 08:00) (118/52 - 118/52)  BP(mean): 72 (30 Sep 2017 08:00) (72 - 72)  RR: 19 (30 Sep 2017 08:00) (16 - 47)  SpO2: 100% (30 Sep 2017 08:00) (20% - 100%)    PHYSICAL EXAM:  General: [x ] non-toxic  HEAD/EYES: [ ] PERRL [ x] white sclera [ ] icterus  ENT:  [ ] normal [x ] supple [ ] thrush [ ] pharyngeal exudate  Cardiovascular:   [ ] murmur [x ] normal [ ] PPM/AICD  Respiratory:  [x ] clear to ausculation bilaterally, CT in place  GI:  x[ ] soft, non-tender, normal bowel sounds  :  [ ] mock [ ] no CVA tenderness   Musculoskeletal:  [ ] no synovitis  Neurologic:  [ x] non-focal exam   Skin:  [ x] no rash  Lymph: [ ] no lymphadenopathy  Psychiatric:  [x ] appropriate affect [ ] alert & oriented  Lines:  [ x] no phlebitis [ ] central line                                7.6    11.39 )-----------( 112      ( 30 Sep 2017 04:10 )             22.5       09-30    144  |  101  |  22  ----------------------------<  106<H>  3.9   |  37<H>  |  0.98    Ca    8.5      30 Sep 2017 04:10  Phos  2.5     09-30  Mg     2.1     09-30            MICROBIOLOGY:    RADIOLOGY:

## 2017-09-30 NOTE — PROGRESS NOTE ADULT - SUBJECTIVE AND OBJECTIVE BOX
Patient is a 47y old  Male who presents with a chief complaint of "My leg wouldn't stop shaking." (14 Sep 2017 09:59)    I have seen and examined the patient Patient has been doing ok  no hemoptysis  no SOB   no chest pain  air leak +  Any change in ROS:     MEDICATIONS  (STANDING):  atorvastatin 80 milliGRAM(s) Oral at bedtime  chlorhexidine 4% Liquid 1 Application(s) Topical daily  polyethylene glycol 3350 17 Gram(s) Oral at bedtime  piperacillin/tazobactam IVPB. 3.375 Gram(s) IV Intermittent every 8 hours  voriconazole 200 milliGRAM(s) Oral every 12 hours  sodium chloride 0.9%. 1000 milliLiter(s) (10 mL/Hr) IV Continuous <Continuous>  docusate sodium 100 milliGRAM(s) Oral two times a day  phenylephrine    Infusion 2 MICROgram(s)/kG/Min (36.375 mL/Hr) IV Continuous <Continuous>  ALBUTerol    90 MICROgram(s) HFA Inhaler 2 Puff(s) Inhalation every 6 hours  ipratropium 17 MICROgram(s) HFA Inhaler 1 Puff(s) Inhalation every 6 hours  pantoprazole  Injectable 40 milliGRAM(s) IV Push every 12 hours  aspirin  chewable 81 milliGRAM(s) Oral daily  heparin  Injectable 5000 Unit(s) SubCutaneous every 8 hours  diVALproex  milliGRAM(s) Oral two times a day  methylPREDNISolone sodium succinate Injectable 20 milliGRAM(s) IV Push daily  apixaban 5 milliGRAM(s) Oral every 12 hours    MEDICATIONS  (PRN):  acetaminophen    Suspension 650 milliGRAM(s) Oral every 6 hours PRN For Temp greater than 38 C (100.4 F)  aluminum hydroxide/magnesium hydroxide/simethicone Suspension 30 milliLiter(s) Oral every 6 hours PRN Dyspepsia  fentaNYL    Injectable 25 MICROGram(s) IV Push every 3 hours PRN Moderate Pain (4 - 6)    Vital Signs Last 24 Hrs  T(C): 37.1 (30 Sep 2017 12:00), Max: 37.2 (30 Sep 2017 00:00)  T(F): 98.7 (30 Sep 2017 12:00), Max: 99 (30 Sep 2017 00:00)  HR: 76 (30 Sep 2017 14:00) (62 - 95)  BP: 117/70 (30 Sep 2017 14:00) (109/59 - 119/72)  BP(mean): 81 (30 Sep 2017 14:00) (72 - 88)  RR: 26 (30 Sep 2017 14:00) (16 - 47)  SpO2: 100% (30 Sep 2017 14:00) (20% - 100%)    I&O's Summary    29 Sep 2017 07:01  -  30 Sep 2017 07:00  --------------------------------------------------------  IN: 960 mL / OUT: 1709 mL / NET: -749 mL    30 Sep 2017 07:01  -  30 Sep 2017 14:52  --------------------------------------------------------  IN: 680 mL / OUT: 460 mL / NET: 220 mL          Physical Exam:   GENERAL: NAD, well-groomed, well-developed  HEENT: GIOVANI/   Atraumatic, Normocephalic  ENMT: No tonsillar erythema, exudates, or enlargement; Moist mucous membranes, Good dentition, No lesions  NECK: Supple, No JVD, Normal thyroid  CHEST/LUNG: coarse rhonchi+  CVS: Regular rate and rhythm; No murmurs, rubs, or gallops  GI: : Soft, Nontender, Nondistended; Bowel sounds present  NERVOUS SYSTEM:  Alert & Oriented X3  EXTREMITIES:  2+ Peripheral Pulses, No clubbing, cyanosis, or edema  LYMPH: No lymphadenopathy noted  SKIN: No rashes or lesions  ENDOCRINOLOGY: No Thyromegaly  PSYCH: Appropriate    Labs:  28                            7.6    11.39 )-----------( 112      ( 30 Sep 2017 04:10 )             22.5                         7.1    12.18 )-----------( 107      ( 29 Sep 2017 04:30 )             22.3                         8.1    25.41 )-----------( 123      ( 28 Sep 2017 04:50 )             24.2                         8.1    23.52 )-----------( 79       ( 27 Sep 2017 02:20 )             23.5                         7.9    20.79 )-----------( 79       ( 26 Sep 2017 17:30 )             23.4     09-30    144  |  101  |  22  ----------------------------<  106<H>  3.9   |  37<H>  |  0.98  09-29    143  |  100  |  22  ----------------------------<  134<H>  4.3   |  37<H>  |  0.92  09-28    146<H>  |  101  |  19  ----------------------------<  140<H>  4.3   |  38<H>  |  1.06  09-27    141  |  100  |  15  ----------------------------<  152<H>  4.0   |  31  |  1.31<H>  09-26    151<H>  |  110<H>  |  16  ----------------------------<  129<H>  4.8   |  29  |  1.45<H>    Ca    8.5      30 Sep 2017 04:10  Ca    8.6      29 Sep 2017 04:30  Phos  2.5     09-30  Phos  2.4     09-29  Mg     2.1     09-30  Mg     2.2     09-29      CAPILLARY BLOOD GLUCOSE                Cultures:     < from: Xray Chest 1 View AP -PORTABLE-Routine (09.30.17 @ 06:07) >    EXAM:  RAD CHEST PORTABLE ROUTINE        PROCEDURE DATE:  Sep 30 2017         INTERPRETATION:  COMPARISON: 9/29    CLINICAL INDICATION: Hemoptysis, aspergilloma.    Impression:  Portable chest dated 9/30 at 4:53 AM shows 2 chest tubes on left side.No   interval change.  Patient is status post left upper lobe surgery  Persistent bilateral opacities and cystic changes due to end-stage   sarcoid..   Heart and mediastinum cannot be evaluated on this projection.                          JULIETTE SMITH M.D., ATTENDING RADIOLOGIST  This document has been electronically signed. Sep 30 2017 11:03AM        < end of copied text >                          Studies  Chest X-RAY  CT SCAN Chest   Venous Dopplers: LE:   CT Abdomen  Others

## 2017-10-01 LAB
BLD GP AB SCN SERPL QL: NEGATIVE — SIGNIFICANT CHANGE UP
BUN SERPL-MCNC: 20 MG/DL — SIGNIFICANT CHANGE UP (ref 7–23)
CA-I BLD-SCNC: 1.1 MMOL/L — SIGNIFICANT CHANGE UP (ref 1.03–1.23)
CALCIUM SERPL-MCNC: 8.3 MG/DL — LOW (ref 8.4–10.5)
CHLORIDE SERPL-SCNC: 99 MMOL/L — SIGNIFICANT CHANGE UP (ref 98–107)
CO2 SERPL-SCNC: 36 MMOL/L — HIGH (ref 22–31)
CREAT SERPL-MCNC: 0.96 MG/DL — SIGNIFICANT CHANGE UP (ref 0.5–1.3)
GLUCOSE SERPL-MCNC: 111 MG/DL — HIGH (ref 70–99)
HCT VFR BLD CALC: 23.6 % — LOW (ref 39–50)
HGB BLD-MCNC: 7.7 G/DL — LOW (ref 13–17)
MAGNESIUM SERPL-MCNC: 2.1 MG/DL — SIGNIFICANT CHANGE UP (ref 1.6–2.6)
MCHC RBC-ENTMCNC: 30.6 PG — SIGNIFICANT CHANGE UP (ref 27–34)
MCHC RBC-ENTMCNC: 32.6 % — SIGNIFICANT CHANGE UP (ref 32–36)
MCV RBC AUTO: 93.7 FL — SIGNIFICANT CHANGE UP (ref 80–100)
NRBC # FLD: 0.02 — SIGNIFICANT CHANGE UP
PHOSPHATE SERPL-MCNC: 2.6 MG/DL — SIGNIFICANT CHANGE UP (ref 2.5–4.5)
PLATELET # BLD AUTO: 121 K/UL — LOW (ref 150–400)
PMV BLD: 12.8 FL — SIGNIFICANT CHANGE UP (ref 7–13)
POTASSIUM SERPL-MCNC: 3.6 MMOL/L — SIGNIFICANT CHANGE UP (ref 3.5–5.3)
POTASSIUM SERPL-SCNC: 3.6 MMOL/L — SIGNIFICANT CHANGE UP (ref 3.5–5.3)
RBC # BLD: 2.52 M/UL — LOW (ref 4.2–5.8)
RBC # FLD: 14.4 % — SIGNIFICANT CHANGE UP (ref 10.3–14.5)
RH IG SCN BLD-IMP: POSITIVE — SIGNIFICANT CHANGE UP
SODIUM SERPL-SCNC: 142 MMOL/L — SIGNIFICANT CHANGE UP (ref 135–145)
SPECIMEN SOURCE: SIGNIFICANT CHANGE UP
WBC # BLD: 10.21 K/UL — SIGNIFICANT CHANGE UP (ref 3.8–10.5)
WBC # FLD AUTO: 10.21 K/UL — SIGNIFICANT CHANGE UP (ref 3.8–10.5)

## 2017-10-01 PROCEDURE — 99233 SBSQ HOSP IP/OBS HIGH 50: CPT

## 2017-10-01 PROCEDURE — 71010: CPT | Mod: 26

## 2017-10-01 RX ORDER — POTASSIUM PHOSPHATE, MONOBASIC POTASSIUM PHOSPHATE, DIBASIC 236; 224 MG/ML; MG/ML
15 INJECTION, SOLUTION INTRAVENOUS ONCE
Qty: 0 | Refills: 0 | Status: DISCONTINUED | OUTPATIENT
Start: 2017-10-01 | End: 2017-10-01

## 2017-10-01 RX ORDER — POTASSIUM CHLORIDE 20 MEQ
10 PACKET (EA) ORAL ONCE
Qty: 0 | Refills: 0 | Status: DISCONTINUED | OUTPATIENT
Start: 2017-10-01 | End: 2017-10-01

## 2017-10-01 RX ORDER — PANTOPRAZOLE SODIUM 20 MG/1
40 TABLET, DELAYED RELEASE ORAL
Qty: 0 | Refills: 0 | Status: DISCONTINUED | OUTPATIENT
Start: 2017-10-01 | End: 2017-11-02

## 2017-10-01 RX ORDER — POTASSIUM CHLORIDE 20 MEQ
40 PACKET (EA) ORAL ONCE
Qty: 0 | Refills: 0 | Status: COMPLETED | OUTPATIENT
Start: 2017-10-01 | End: 2017-10-01

## 2017-10-01 RX ORDER — FERROUS SULFATE 325(65) MG
325 TABLET ORAL EVERY 8 HOURS
Qty: 0 | Refills: 0 | Status: DISCONTINUED | OUTPATIENT
Start: 2017-10-01 | End: 2017-11-02

## 2017-10-01 RX ORDER — ACETAMINOPHEN 500 MG
650 TABLET ORAL EVERY 6 HOURS
Qty: 0 | Refills: 0 | Status: DISCONTINUED | OUTPATIENT
Start: 2017-10-01 | End: 2017-10-04

## 2017-10-01 RX ORDER — FOLIC ACID 0.8 MG
1 TABLET ORAL DAILY
Qty: 0 | Refills: 0 | Status: DISCONTINUED | OUTPATIENT
Start: 2017-10-01 | End: 2017-11-02

## 2017-10-01 RX ORDER — OXYCODONE HYDROCHLORIDE 5 MG/1
10 TABLET ORAL EVERY 4 HOURS
Qty: 0 | Refills: 0 | Status: DISCONTINUED | OUTPATIENT
Start: 2017-10-01 | End: 2017-10-03

## 2017-10-01 RX ORDER — SODIUM,POTASSIUM PHOSPHATES 278-250MG
1 POWDER IN PACKET (EA) ORAL
Qty: 0 | Refills: 0 | Status: COMPLETED | OUTPATIENT
Start: 2017-10-01 | End: 2017-10-02

## 2017-10-01 RX ORDER — OXYCODONE HYDROCHLORIDE 5 MG/1
5 TABLET ORAL EVERY 4 HOURS
Qty: 0 | Refills: 0 | Status: DISCONTINUED | OUTPATIENT
Start: 2017-10-01 | End: 2017-10-03

## 2017-10-01 RX ADMIN — Medication 1000 MILLIGRAM(S): at 00:55

## 2017-10-01 RX ADMIN — ALBUTEROL 2 PUFF(S): 90 AEROSOL, METERED ORAL at 21:51

## 2017-10-01 RX ADMIN — PIPERACILLIN AND TAZOBACTAM 25 GRAM(S): 4; .5 INJECTION, POWDER, LYOPHILIZED, FOR SOLUTION INTRAVENOUS at 14:30

## 2017-10-01 RX ADMIN — PANTOPRAZOLE SODIUM 40 MILLIGRAM(S): 20 TABLET, DELAYED RELEASE ORAL at 17:41

## 2017-10-01 RX ADMIN — Medication 1 PUFF(S): at 03:45

## 2017-10-01 RX ADMIN — HEPARIN SODIUM 5000 UNIT(S): 5000 INJECTION INTRAVENOUS; SUBCUTANEOUS at 06:49

## 2017-10-01 RX ADMIN — Medication 40 MILLIEQUIVALENT(S): at 17:45

## 2017-10-01 RX ADMIN — Medication 1 TABLET(S): at 09:30

## 2017-10-01 RX ADMIN — Medication 325 MILLIGRAM(S): at 12:30

## 2017-10-01 RX ADMIN — VORICONAZOLE 200 MILLIGRAM(S): 10 INJECTION, POWDER, LYOPHILIZED, FOR SOLUTION INTRAVENOUS at 06:50

## 2017-10-01 RX ADMIN — APIXABAN 5 MILLIGRAM(S): 2.5 TABLET, FILM COATED ORAL at 17:41

## 2017-10-01 RX ADMIN — ATORVASTATIN CALCIUM 80 MILLIGRAM(S): 80 TABLET, FILM COATED ORAL at 21:48

## 2017-10-01 RX ADMIN — Medication 1 PUFF(S): at 15:22

## 2017-10-01 RX ADMIN — Medication 400 MILLIGRAM(S): at 00:55

## 2017-10-01 RX ADMIN — Medication 81 MILLIGRAM(S): at 12:30

## 2017-10-01 RX ADMIN — PIPERACILLIN AND TAZOBACTAM 25 GRAM(S): 4; .5 INJECTION, POWDER, LYOPHILIZED, FOR SOLUTION INTRAVENOUS at 21:48

## 2017-10-01 RX ADMIN — CHLORHEXIDINE GLUCONATE 1 APPLICATION(S): 213 SOLUTION TOPICAL at 12:30

## 2017-10-01 RX ADMIN — Medication 325 MILLIGRAM(S): at 21:48

## 2017-10-01 RX ADMIN — ALBUTEROL 2 PUFF(S): 90 AEROSOL, METERED ORAL at 03:45

## 2017-10-01 RX ADMIN — Medication 1 TABLET(S): at 17:46

## 2017-10-01 RX ADMIN — DIVALPROEX SODIUM 500 MILLIGRAM(S): 500 TABLET, DELAYED RELEASE ORAL at 17:41

## 2017-10-01 RX ADMIN — DIVALPROEX SODIUM 500 MILLIGRAM(S): 500 TABLET, DELAYED RELEASE ORAL at 06:49

## 2017-10-01 RX ADMIN — Medication 100 MILLIGRAM(S): at 06:49

## 2017-10-01 RX ADMIN — Medication 1 PUFF(S): at 21:52

## 2017-10-01 RX ADMIN — VORICONAZOLE 200 MILLIGRAM(S): 10 INJECTION, POWDER, LYOPHILIZED, FOR SOLUTION INTRAVENOUS at 17:41

## 2017-10-01 RX ADMIN — ALBUTEROL 2 PUFF(S): 90 AEROSOL, METERED ORAL at 09:44

## 2017-10-01 RX ADMIN — APIXABAN 5 MILLIGRAM(S): 2.5 TABLET, FILM COATED ORAL at 06:49

## 2017-10-01 RX ADMIN — PIPERACILLIN AND TAZOBACTAM 25 GRAM(S): 4; .5 INJECTION, POWDER, LYOPHILIZED, FOR SOLUTION INTRAVENOUS at 06:50

## 2017-10-01 RX ADMIN — Medication 20 MILLIGRAM(S): at 06:54

## 2017-10-01 RX ADMIN — PANTOPRAZOLE SODIUM 40 MILLIGRAM(S): 20 TABLET, DELAYED RELEASE ORAL at 06:49

## 2017-10-01 RX ADMIN — ALBUTEROL 2 PUFF(S): 90 AEROSOL, METERED ORAL at 15:22

## 2017-10-01 RX ADMIN — Medication 1 PUFF(S): at 09:43

## 2017-10-01 RX ADMIN — Medication 1 MILLIGRAM(S): at 12:30

## 2017-10-01 NOTE — PROGRESS NOTE ADULT - SUBJECTIVE AND OBJECTIVE BOX
Patient is a 47y old  Male who presents with a chief complaint of "My leg wouldn't stop shaking." (14 Sep 2017 09:59)    sitting in chair  doing better  no cough  now wheezing   Any change in ROS:     MEDICATIONS  (STANDING):  atorvastatin 80 milliGRAM(s) Oral at bedtime  chlorhexidine 4% Liquid 1 Application(s) Topical daily  polyethylene glycol 3350 17 Gram(s) Oral at bedtime  piperacillin/tazobactam IVPB. 3.375 Gram(s) IV Intermittent every 8 hours  voriconazole 200 milliGRAM(s) Oral every 12 hours  sodium chloride 0.9%. 1000 milliLiter(s) (10 mL/Hr) IV Continuous <Continuous>  docusate sodium 100 milliGRAM(s) Oral two times a day  phenylephrine    Infusion 2 MICROgram(s)/kG/Min (36.375 mL/Hr) IV Continuous <Continuous>  ALBUTerol    90 MICROgram(s) HFA Inhaler 2 Puff(s) Inhalation every 6 hours  ipratropium 17 MICROgram(s) HFA Inhaler 1 Puff(s) Inhalation every 6 hours  pantoprazole  Injectable 40 milliGRAM(s) IV Push every 12 hours  aspirin  chewable 81 milliGRAM(s) Oral daily  diVALproex  milliGRAM(s) Oral two times a day  methylPREDNISolone sodium succinate Injectable 20 milliGRAM(s) IV Push daily  apixaban 5 milliGRAM(s) Oral every 12 hours  folic acid 1 milliGRAM(s) Oral daily  ferrous    sulfate 325 milliGRAM(s) Oral every 8 hours  potassium chloride    Tablet ER 40 milliEquivalent(s) Oral once  potassium acid phosphate/sodium acid phosphate tablet (K-PHOS No. 2) 1 Tablet(s) Oral three times a day with meals    MEDICATIONS  (PRN):  acetaminophen    Suspension 650 milliGRAM(s) Oral every 6 hours PRN For Temp greater than 38 C (100.4 F)  aluminum hydroxide/magnesium hydroxide/simethicone Suspension 30 milliLiter(s) Oral every 6 hours PRN Dyspepsia  fentaNYL    Injectable 25 MICROGram(s) IV Push every 3 hours PRN Moderate Pain (4 - 6)    Vital Signs Last 24 Hrs  T(C): 36.7 (01 Oct 2017 04:00), Max: 36.9 (30 Sep 2017 16:00)  T(F): 98 (01 Oct 2017 04:00), Max: 98.5 (01 Oct 2017 00:00)  HR: 72 (01 Oct 2017 11:00) (67 - 93)  BP: 108/72 (01 Oct 2017 11:00) (99/55 - 131/72)  BP(mean): 79 (01 Oct 2017 11:00) (63 - 89)  RR: 17 (01 Oct 2017 11:00) (16 - 27)  SpO2: 98% (01 Oct 2017 11:00) (93% - 100%)    I&O's Summary    30 Sep 2017 07:01  -  01 Oct 2017 07:00  --------------------------------------------------------  IN: 1230 mL / OUT: 1750 mL / NET: -520 mL    01 Oct 2017 07:01  -  01 Oct 2017 13:21  --------------------------------------------------------  IN: 290 mL / OUT: 0 mL / NET: 290 mL          Physical Exam:   GENERAL: NAD, well-groomed, well-developed  HEENT: GIOVANI/   Atraumatic, Normocephalic  ENMT: No tonsillar erythema, exudates, or enlargement; Moist mucous membranes, Good dentition, No lesions  NECK: Supple, No JVD, Normal thyroid  CHEST/LUNG: Coarse rhonchi  CVS: Regular rate and rhythm; No murmurs, rubs, or gallops  GI: : Soft, Nontender, Nondistended; Bowel sounds present  NERVOUS SYSTEM:  Alert & Oriented X3  EXTREMITIES:  2+ Peripheral Pulses, No clubbing, cyanosis, or edema  LYMPH: No lymphadenopathy noted  SKIN: No rashes or lesions  ENDOCRINOLOGY: No Thyromegaly  PSYCH: Appropriate    Labs:  28                            7.7    10.21 )-----------( 121      ( 01 Oct 2017 04:00 )             23.6                         7.6    11.39 )-----------( 112      ( 30 Sep 2017 04:10 )             22.5                         7.1    12.18 )-----------( 107      ( 29 Sep 2017 04:30 )             22.3                         8.1    25.41 )-----------( 123      ( 28 Sep 2017 04:50 )             24.2     10-01    142  |  99  |  20  ----------------------------<  111<H>  3.6   |  36<H>  |  0.96  09-30    144  |  101  |  22  ----------------------------<  106<H>  3.9   |  37<H>  |  0.98  09-29    143  |  100  |  22  ----------------------------<  134<H>  4.3   |  37<H>  |  0.92  09-28    146<H>  |  101  |  19  ----------------------------<  140<H>  4.3   |  38<H>  |  1.06    Ca    8.3<L>      01 Oct 2017 04:00  Ca    8.5      30 Sep 2017 04:10  Phos  2.6     10-01  Phos  2.5     09-30  Mg     2.1     10-01  Mg     2.1     09-30      CAPILLARY BLOOD GLUCOSE                Cultures:         < from: Xray Chest 1 View AP -PORTABLE-Routine (09.30.17 @ 06:07) >        INTERPRETATION:  COMPARISON: 9/29    CLINICAL INDICATION: Hemoptysis, aspergilloma.    Impression:  Portable chest dated 9/30 at 4:53 AM shows 2 chest tubes on left side.No   interval change.  Patient is status post left upper lobe surgery  Persistent bilateral opacities and cystic changes due to end-stage   sarcoid..   Heart and mediastinum cannot be evaluated on this projection.                          JULIETTE SMITH M.D., ATTENDING RADIOLOGIST  This document has been electronically signed. Sep 30 2017 11:03AM    < end of copied text >                      Studies  Chest X-RAY  CT SCAN Chest   Venous Dopplers: LE:   CT Abdomen  Others

## 2017-10-01 NOTE — PROGRESS NOTE ADULT - PROBLEM SELECTOR PLAN 6
on steroids at this time: it was given only for a short duration: thinking his wheezing is secondary to rhinovirus infection: It will cont till tomorrow: thereafter would taper it down to off in next 2-3  10/1: change prednisone to 10 mg daily

## 2017-10-01 NOTE — PROGRESS NOTE ADULT - PROBLEM SELECTOR PLAN 3
wheezing is present :pk pressures in 30's : started him on steroids again  bronchodilators ATC:  9/27; if he continues to show improvement: can decrease his solumedrol tomorrow  9/28: decreases his IV steroids to 20 mg once a day for a few days more  9/29: on 20 mg per day of solumedrol: would continue for few more days and then decrease it  9/30: doing ok: - wheezing: decrease his steroids to 10 mg a day from tomorrow  10/1: change to po prednisone 10 mg

## 2017-10-01 NOTE — CHART NOTE - NSCHARTNOTEFT_GEN_A_CORE
Source: Patient &    Family  Diet : Regular    Patient reports fair to good appetite, PO intake , tolerating diet , noted wt. lower and pt. states he needs to loose wt. , he is overweight .    Current Weight: Weight (kg): 92.6 kg on 10/1/17; Admit wt. - 97.1 kg ; BMI - 28.6    Pertinent Medications: MEDICATIONS  (STANDING):    atorvastatin 80 milliGRAM(s) Oral at bedtime  chlorhexidine 4% Liquid 1 Application(s) Topical daily  polyethylene glycol 3350 17 Gram(s) Oral at bedtime  piperacillin/tazobactam IVPB. 3.375 Gram(s) IV Intermittent every 8 hours  voriconazole 200 milliGRAM(s) Oral every 12 hours  sodium chloride 0.9%. 1000 milliLiter(s) (10 mL/Hr) IV Continuous <Continuous>  docusate sodium 100 milliGRAM(s) Oral two times a day  phenylephrine    Infusion 2 MICROgram(s)/kG/Min (36.375 mL/Hr) IV Continuous <Continuous>  ALBUTerol    90 MICROgram(s) HFA Inhaler 2 Puff(s) Inhalation every 6 hours  ipratropium 17 MICROgram(s) HFA Inhaler 1 Puff(s) Inhalation every 6 hours  pantoprazole  Injectable 40 milliGRAM(s) IV Push every 12 hours  aspirin  chewable 81 milliGRAM(s) Oral daily  diVALproex  milliGRAM(s) Oral two times a day  methylPREDNISolone sodium succinate Injectable 20 milliGRAM(s) IV Push daily  apixaban 5 milliGRAM(s) Oral every 12 hours  folic acid 1 milliGRAM(s) Oral daily  ferrous    sulfate 325 milliGRAM(s) Oral every 8 hours  potassium chloride    Tablet ER 40 milliEquivalent(s) Oral once  potassium acid phosphate/sodium acid phosphate tablet (K-PHOS No. 2) 1 Tablet(s) Oral three times a day with meals    MEDICATIONS  (PRN):  acetaminophen    Suspension 650 milliGRAM(s) Oral every 6 hours PRN For Temp greater than 38 C (100.4 F)  aluminum hydroxide/magnesium hydroxide/simethicone Suspension 30 milliLiter(s) Oral every 6 hours PRN Dyspepsia  fentaNYL    Injectable 25 MICROGram(s) IV Push every 3 hours PRN Moderate Pain (4 - 6)    Pertinent Labs:  10-01 Na142 mmol/L Glu 111 mg/dL<H> K+ 3.6 mmol/L Cr  0.96 mg/dL BUN 20 mg/dL Phos 2.6 mg/dL Alb n/a   PAB n/a         Skin: intact    Estimated Needs:  -  no change since previous assessment    Recommend to continue Regular diet      Monitoring and Evaluation:     1. PO intake   2. Tolerance to diet prescription   3. weights   4. follow up per protocol

## 2017-10-01 NOTE — PROGRESS NOTE ADULT - SUBJECTIVE AND OBJECTIVE BOX
OSCAR CHACKO            MRN-[id number]         No Known Allergies             47 M PMH of sarcoidosis, HTN, COPD, and asthma who was initially admitted on 09/10 w cc of severe leg spasms X two days  In E.D. pt found to be wheezing, so he was seen by Pulmonary and started on IV Steroids for sarcoidosis  On 9/12 at 4 am, a RRT and Stroke codes were called as the patient became unresponsive.  He was transferred to MICU and then intubated for airway protection.   The patient was found to have a new infarct on MRI and also found to have a patent PFO on this admission as well. He was weaned off vent on 09/13 and transferred to the floors   There was concern patient was having paroxysmal afib, so he was started on Eliquis yesterday evening. He has received 2 doses of Eliquis on 09/19.   However, on 09/20, the patient had a coughing episode, which was not unusual for him, but during this episode he began to cough up fresh blood, about 1/2 cup of blood. He underwent Bronchoscopy with bronchoalveolar lavage of both sides and epi injection on 09/21 and was to have received further IR embolization for hemoptysis control in the next few days.  However, he developed hemoptysis again and underwent urgent LULectomy, complicated with post op bleeding.  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              Sarcoidosis on IV steroids              PFO              Anemia              s/p massive transfusion       Home Medications:   * Incomplete Medication History as of 11-Sep-2017 09:23 documented in Structured Notes  · 	Azithromycin 5 Day Dose Pack 250 mg oral tablet: 1 dose(s) orally once a day for respiratory infection, Last Dose Taken:    · 	predniSONE 50 mg oral tablet: 1 tab(s) orally once a day , Last Dose Taken:    · 	albuterol 1.25 mg/3 mL (0.042%) inhalation solution: 3 milliliter(s) inhaled 3 times a day, Last Dose Taken:    · 	losartan 25 mg oral tablet: 1 tab(s) orally 2 times a day, Last Dose Taken:    · 	Symbicort 160 mcg-4.5 mcg/inh inhalation aerosol: 2 puff(s) inhaled 2 times a day, Last Dose Taken:    · 	Incruse Ellipta: 1 puff(s) inhaled once a day, Last Dose Taken:      PAST MEDICAL & SURGICAL HISTORY:  History of pneumothorax: History of 3 prior pneumonthoracies.  COPD (chronic obstructive pulmonary disease)  Asthma  Hypertension  Sarcoidosis  No significant past surgical history        ICU Vital Signs Last 24 Hrs  T(C): 36.7 (01 Oct 2017 04:00), Max: 36.9 (30 Sep 2017 16:00)  T(F): 98 (01 Oct 2017 04:00), Max: 98.5 (01 Oct 2017 00:00)  HR: 72 (01 Oct 2017 11:00) (67 - 93)  BP: 108/72 (01 Oct 2017 11:00) (99/55 - 131/72)  BP(mean): 79 (01 Oct 2017 11:00) (63 - 89)  ABP: --  ABP(mean): --  RR: 17 (01 Oct 2017 11:00) (16 - 27)  SpO2: 98% (01 Oct 2017 11:00) (93% - 100%)    I&O's Summary    30 Sep 2017 07:01  -  01 Oct 2017 07:00  --------------------------------------------------------  IN: 1230 mL / OUT: 1750 mL / NET: -520 mL    01 Oct 2017 07:01  -  01 Oct 2017 12:34  --------------------------------------------------------  IN: 290 mL / OUT: 0 mL / NET: 290 mL      CAPILLARY BLOOD GLUCOSE      MEDICATIONS  (STANDING):  atorvastatin 80 milliGRAM(s) Oral at bedtime  chlorhexidine 4% Liquid 1 Application(s) Topical daily  polyethylene glycol 3350 17 Gram(s) Oral at bedtime  piperacillin/tazobactam IVPB. 3.375 Gram(s) IV Intermittent every 8 hours  voriconazole 200 milliGRAM(s) Oral every 12 hours  sodium chloride 0.9%. 1000 milliLiter(s) (10 mL/Hr) IV Continuous <Continuous>  docusate sodium 100 milliGRAM(s) Oral two times a day  phenylephrine    Infusion 2 MICROgram(s)/kG/Min (36.375 mL/Hr) IV Continuous <Continuous>  ALBUTerol    90 MICROgram(s) HFA Inhaler 2 Puff(s) Inhalation every 6 hours  ipratropium 17 MICROgram(s) HFA Inhaler 1 Puff(s) Inhalation every 6 hours  pantoprazole  Injectable 40 milliGRAM(s) IV Push every 12 hours  aspirin  chewable 81 milliGRAM(s) Oral daily  diVALproex  milliGRAM(s) Oral two times a day  methylPREDNISolone sodium succinate Injectable 20 milliGRAM(s) IV Push daily  apixaban 5 milliGRAM(s) Oral every 12 hours  folic acid 1 milliGRAM(s) Oral daily  ferrous    sulfate 325 milliGRAM(s) Oral every 8 hours  potassium chloride    Tablet ER 40 milliEquivalent(s) Oral once  potassium acid phosphate/sodium acid phosphate tablet (K-PHOS No. 2) 1 Tablet(s) Oral three times a day with meals    MEDICATIONS  (PRN):  acetaminophen    Suspension 650 milliGRAM(s) Oral every 6 hours PRN For Temp greater than 38 C (100.4 F)  aluminum hydroxide/magnesium hydroxide/simethicone Suspension 30 milliLiter(s) Oral every 6 hours PRN Dyspepsia  fentaNYL    Injectable 25 MICROGram(s) IV Push every 3 hours PRN Moderate Pain (4 - 6)      Physical exam:   General:               Pt is awake, alert,                                                Neuro:                 focal,  weak   L>R  KAMLESH>LL                          Cardiovascular:    S1 & S2, regular                           Respiratory:         Air entry is fair and has bilateral conducted sounds                           GI:                          Soft, nondistended and nontender, Bowel sounds active                            Ext:                        No cyanosis &  mild  bilateral  edema of upper ext                            Labs:                                                                           7.7    10.21 )-----------( 121      ( 01 Oct 2017 04:00 )             23.6             10-01    142  |  99  |  20  ----------------------------<  111<H>  3.6   |  36<H>  |  0.96    Ca    8.3<L>      01 Oct 2017 04:00  Phos  2.6     10-01  Mg     2.1     10-01      CXR:  < from: Xray Chest 1 View AP -PORTABLE-Routine (09.30.17 @ 06:07) >  Portable chest dated 9/30 at 4:53 AM shows 2 chest tubes on left side.No   interval change.  Patient is status post left upper lobe surgery  Persistent bilateral opacities and cystic changes due to end-stage   sarcoid..   Heart and mediastinum cannot be evaluated on this projection.       Plan:      47 M PMH of sarcoidosis, HTN, COPD, and asthma who was initially admitted on 09/10 w cc of severe leg spasms X two days  In E.D. pt found to be wheezing, so he was seen by Pulmonary and started on IV Steroids for sarcoidosis  On 9/12 at 4 am, a RRT and Stroke codes were called as the patient became unresponsive.  He was transferred to MICU and then intubated for airway protection.   The patient was found to have a new infarct on MRI and also found to have a patent PFO on this admission as well. He was weaned off vent on 09/13 and transferred to the floors   There was concern patient was having paroxysmal afib, so he was started on Eliquis yesterday evening. He has received 2 doses of Eliquis on 09/19.   However, on 09/20, the patient had a coughing episode, which was not unusual for him, but during this episode he began to cough up fresh blood, about 1/2 cup of blood. He underwent Bronchoscopy with bronchoalveolar lavage of both sides and epi injection on 09/21 and was to have received further IR embolization for hemoptysis control in the next few days.  However, he developed hemoptysis again and underwent urgent LULectomy, complicated with post op bleeding.  POD # 6 (092417): Left VATS and DAVID lobectomy  POD # 5 (092517): re-op L thoracotomy, evacuation of hemothorax (600cc clot, 400cc blood) no obvious site of bleeding  POD # 5 (092517): re-op L thoracotomy, evacuation of hemothorax; RTOR x2 100cc clot; 200cc blood: bleeding posterior intercostal arteries identified and cauterized                                  Neuro:                                         Pain control with Fentanyl IV PRN    CVA - Continue PT                            Cardiovascular:                                             Continue hemodynamic monitoring    PFO - On Eliquis 5mg/bid                            Respiratory:                                                                                                                                                                                                                                                                                                                                                                                                                                                                                                                                                                                                                                                                                                                                                                                                                                                                                                      Comfortable, not in any distress.                                         Monitor chest tube output                                         Chest tube to water seal - posterior tube has small air leak                                                               Sarcoidosis on Solumedrol. Continue bronchodilators & pulmonary toilet     Pulmonary rehab                          GI                                         Tolerating PO                                         Continue GI prophylaxis with Protonix                                         Continue Zofran / Reglan for nausea - PRN	                                                                 Renal:                                                                                  Monitor I/Os and electrolytes                                                 Hem/ Onc:                                                                                  Monitor chest tube output &  outputs     Anemia: Continue Iron / FA                                         Follow CBC                            Infectious disease:    Aspergilloma - On voriconazole 200 milliGRAM(s) Oral every 12 hours      Monitor for fever / leukocytosis.                                         All surgical incision / chest tube  sites look clean                            Endocrine                                          Continue Accu-Checks with coverage    All available pertinent clinical, laboratory, radiographic, hemodynamic, echocardiographic, respiratory data, microbiologic data and chart were reviewed and analyzed   Patient seen, examined and plan discussed with CT Surgeon Dr. Arias/ CTICU team during rounds.        Len Kim MD

## 2017-10-02 LAB
BUN SERPL-MCNC: 18 MG/DL — SIGNIFICANT CHANGE UP (ref 7–23)
CALCIUM SERPL-MCNC: 8 MG/DL — LOW (ref 8.4–10.5)
CHLORIDE SERPL-SCNC: 101 MMOL/L — SIGNIFICANT CHANGE UP (ref 98–107)
CO2 SERPL-SCNC: 36 MMOL/L — HIGH (ref 22–31)
CREAT SERPL-MCNC: 0.9 MG/DL — SIGNIFICANT CHANGE UP (ref 0.5–1.3)
GLUCOSE SERPL-MCNC: 95 MG/DL — SIGNIFICANT CHANGE UP (ref 70–99)
HCT VFR BLD CALC: 25.7 % — LOW (ref 39–50)
HGB BLD-MCNC: 8.2 G/DL — LOW (ref 13–17)
MAGNESIUM SERPL-MCNC: 2.1 MG/DL — SIGNIFICANT CHANGE UP (ref 1.6–2.6)
MCHC RBC-ENTMCNC: 30.1 PG — SIGNIFICANT CHANGE UP (ref 27–34)
MCHC RBC-ENTMCNC: 31.9 % — LOW (ref 32–36)
MCV RBC AUTO: 94.5 FL — SIGNIFICANT CHANGE UP (ref 80–100)
NRBC # FLD: 0 — SIGNIFICANT CHANGE UP
PHOSPHATE SERPL-MCNC: 2.6 MG/DL — SIGNIFICANT CHANGE UP (ref 2.5–4.5)
PLATELET # BLD AUTO: 147 K/UL — LOW (ref 150–400)
PMV BLD: 12.7 FL — SIGNIFICANT CHANGE UP (ref 7–13)
POTASSIUM SERPL-MCNC: 4 MMOL/L — SIGNIFICANT CHANGE UP (ref 3.5–5.3)
POTASSIUM SERPL-SCNC: 4 MMOL/L — SIGNIFICANT CHANGE UP (ref 3.5–5.3)
RBC # BLD: 2.72 M/UL — LOW (ref 4.2–5.8)
RBC # FLD: 14.6 % — HIGH (ref 10.3–14.5)
SODIUM SERPL-SCNC: 142 MMOL/L — SIGNIFICANT CHANGE UP (ref 135–145)
WBC # BLD: 11.85 K/UL — HIGH (ref 3.8–10.5)
WBC # FLD AUTO: 11.85 K/UL — HIGH (ref 3.8–10.5)

## 2017-10-02 PROCEDURE — 71010: CPT | Mod: 26,77

## 2017-10-02 PROCEDURE — 99232 SBSQ HOSP IP/OBS MODERATE 35: CPT

## 2017-10-02 PROCEDURE — 71010: CPT | Mod: 26

## 2017-10-02 PROCEDURE — 99233 SBSQ HOSP IP/OBS HIGH 50: CPT

## 2017-10-02 RX ORDER — ACETAMINOPHEN 500 MG
1000 TABLET ORAL ONCE
Qty: 0 | Refills: 0 | Status: COMPLETED | OUTPATIENT
Start: 2017-10-02 | End: 2017-10-02

## 2017-10-02 RX ADMIN — ALBUTEROL 2 PUFF(S): 90 AEROSOL, METERED ORAL at 22:46

## 2017-10-02 RX ADMIN — APIXABAN 5 MILLIGRAM(S): 2.5 TABLET, FILM COATED ORAL at 06:49

## 2017-10-02 RX ADMIN — VORICONAZOLE 200 MILLIGRAM(S): 10 INJECTION, POWDER, LYOPHILIZED, FOR SOLUTION INTRAVENOUS at 06:49

## 2017-10-02 RX ADMIN — Medication 81 MILLIGRAM(S): at 12:05

## 2017-10-02 RX ADMIN — Medication 400 MILLIGRAM(S): at 21:30

## 2017-10-02 RX ADMIN — Medication 10 MILLIGRAM(S): at 06:50

## 2017-10-02 RX ADMIN — PIPERACILLIN AND TAZOBACTAM 25 GRAM(S): 4; .5 INJECTION, POWDER, LYOPHILIZED, FOR SOLUTION INTRAVENOUS at 21:41

## 2017-10-02 RX ADMIN — ALBUTEROL 2 PUFF(S): 90 AEROSOL, METERED ORAL at 03:43

## 2017-10-02 RX ADMIN — VORICONAZOLE 200 MILLIGRAM(S): 10 INJECTION, POWDER, LYOPHILIZED, FOR SOLUTION INTRAVENOUS at 18:43

## 2017-10-02 RX ADMIN — PIPERACILLIN AND TAZOBACTAM 25 GRAM(S): 4; .5 INJECTION, POWDER, LYOPHILIZED, FOR SOLUTION INTRAVENOUS at 06:49

## 2017-10-02 RX ADMIN — PIPERACILLIN AND TAZOBACTAM 25 GRAM(S): 4; .5 INJECTION, POWDER, LYOPHILIZED, FOR SOLUTION INTRAVENOUS at 03:42

## 2017-10-02 RX ADMIN — APIXABAN 5 MILLIGRAM(S): 2.5 TABLET, FILM COATED ORAL at 18:43

## 2017-10-02 RX ADMIN — Medication 1 PUFF(S): at 09:17

## 2017-10-02 RX ADMIN — Medication 1 MILLIGRAM(S): at 12:05

## 2017-10-02 RX ADMIN — Medication 1 PUFF(S): at 03:43

## 2017-10-02 RX ADMIN — ALBUTEROL 2 PUFF(S): 90 AEROSOL, METERED ORAL at 15:54

## 2017-10-02 RX ADMIN — Medication 1000 MILLIGRAM(S): at 22:00

## 2017-10-02 RX ADMIN — Medication 1 PUFF(S): at 22:46

## 2017-10-02 RX ADMIN — DIVALPROEX SODIUM 500 MILLIGRAM(S): 500 TABLET, DELAYED RELEASE ORAL at 18:43

## 2017-10-02 RX ADMIN — CHLORHEXIDINE GLUCONATE 1 APPLICATION(S): 213 SOLUTION TOPICAL at 06:33

## 2017-10-02 RX ADMIN — PANTOPRAZOLE SODIUM 40 MILLIGRAM(S): 20 TABLET, DELAYED RELEASE ORAL at 06:49

## 2017-10-02 RX ADMIN — DIVALPROEX SODIUM 500 MILLIGRAM(S): 500 TABLET, DELAYED RELEASE ORAL at 06:49

## 2017-10-02 RX ADMIN — Medication 1 PUFF(S): at 15:53

## 2017-10-02 RX ADMIN — ATORVASTATIN CALCIUM 80 MILLIGRAM(S): 80 TABLET, FILM COATED ORAL at 21:43

## 2017-10-02 RX ADMIN — Medication 325 MILLIGRAM(S): at 14:00

## 2017-10-02 RX ADMIN — PANTOPRAZOLE SODIUM 40 MILLIGRAM(S): 20 TABLET, DELAYED RELEASE ORAL at 18:42

## 2017-10-02 RX ADMIN — Medication 325 MILLIGRAM(S): at 21:44

## 2017-10-02 RX ADMIN — Medication 1 TABLET(S): at 09:57

## 2017-10-02 RX ADMIN — ALBUTEROL 2 PUFF(S): 90 AEROSOL, METERED ORAL at 09:17

## 2017-10-02 RX ADMIN — SODIUM CHLORIDE 10 MILLILITER(S): 9 INJECTION INTRAMUSCULAR; INTRAVENOUS; SUBCUTANEOUS at 21:43

## 2017-10-02 RX ADMIN — Medication 325 MILLIGRAM(S): at 06:49

## 2017-10-02 NOTE — PROGRESS NOTE ADULT - ASSESSMENT
47 year old male with a PMHx of sarcoidosis, HTN, COPD, and asthma who initially presented to the ED with severe leg spasms. Prolonged hospital stay. Was found to have mycetoma on CT chest, and had resection on 9/24, back to OR 9/26 for bleeding. Today, overall appears stable, cultures from OR remain negative. Still on empiric vori and zosyn. Overall stable.  - Continue Zosyn 3.375g q 8  - Continue Vori 200mg q 12  - F/U OR cultures; F/U OR Path    Stiven Olmstead MD  Pager [phone]  After 5pm and on weekends call [phone]

## 2017-10-02 NOTE — PROGRESS NOTE ADULT - PROBLEM SELECTOR PLAN 3
wheezing is present :pk pressures in 30's : started him on steroids again  bronchodilators ATC:  9/27; if he continues to show improvement: can decrease his solumedrol tomorrow  9/28: decreases his IV steroids to 20 mg once a day for a few days more  9/29: on 20 mg per day of solumedrol: would continue for few more days and then decrease it  9/30: doing ok: - wheezing: decrease his steroids to 10 mg a day from tomorrow  10/1: change to po prednisone 10 mg  10/02; cont po prednisone

## 2017-10-02 NOTE — PROGRESS NOTE ADULT - SUBJECTIVE AND OBJECTIVE BOX
OSCAR CHACKO          MRN-[id number]    HPI:  Patient is a 47 year old male with a PMHx of sarcoidosis, HTN, COPD, and asthma who presents to the ED with severe leg spasms. These intermittent left leg spasms began suddenly two days ago in the patient's home and have become more regular and worsening in contracture. The spasm began in the quad leading to severe erratic left leg movements that can last from seconds to minutes. Once the leg spasm and contracture stops, an "intense tingling and pain sensation" which starts in the foot and radiates up the left side of his body to his neck. Once the tingling sensation reached his neck, the patient became faint, but did not fully lose consciousness. The patient also had a HA at the time of the spasm and tingling which was diffuse throughout the head and rated a 10/10 on a pain scale. He denied ever having this severe of a HA or leg spasm prior this incident; the HA started yesterday and is still present today. Patient denies fever, chills, N/V/D, orthopnea, NPD, sputum, chest pain, leg swelling, and abdominal pain.     In E.D. pt found to have an abnormal EKG, where house cardiology for consulted.   On admission pt found to have moderate inspiratory and expiratory wheezes. (11 Sep 2017 07:22)      Procedure:  POD # :     Issues:        Interval/Overnight Events/ ROS  Pt remained hemodynamically stable overnight, not on any pressors or inotropes. OOB to chair, breathing comfortably with minimal pain. Ambulated several times . Denies pain, no SOB, no palpitations, no nausea/ no vomiting, no dizziness  A-line and mock d/carmen         PAST MEDICAL & SURGICAL HISTORY:  History of pneumothorax: History of 3 prior pneumonthoracies.  COPD (chronic obstructive pulmonary disease)  Asthma  Hypertension  Sarcoidosis  No significant past surgical history    Allergies    No Known Allergies    Intolerances            ***VITAL SIGNS:  Vital Signs Last 24 Hrs  T(C): 37 (02 Oct 2017 04:00), Max: 37.2 (01 Oct 2017 12:00)  T(F): 98.6 (02 Oct 2017 04:00), Max: 98.9 (01 Oct 2017 12:00)  HR: 100 (02 Oct 2017 09:17) (71 - 100)  BP: 132/73 (02 Oct 2017 09:00) (96/65 - 132/73)  BP(mean): 86 (02 Oct 2017 09:00) (70 - 109)  RR: 20 (02 Oct 2017 08:00) (14 - 25)  SpO2: 100% (02 Oct 2017 09:17) (96% - 100%)    I/Os:   I&O's Detail    01 Oct 2017 07:01  -  02 Oct 2017 07:00  --------------------------------------------------------  IN:    IV PiggyBack: 200 mL    Oral Fluid: 240 mL    sodium chloride 0.9%.: 230 mL  Total IN: 670 mL    OUT:    Chest Tube: 30 mL    Chest Tube: 230 mL    Voided: 1700 mL  Total OUT: 1960 mL    Total NET: -1290 mL      02 Oct 2017 07:01  -  02 Oct 2017 09:59  --------------------------------------------------------  IN:    Oral Fluid: 240 mL    sodium chloride 0.9%.: 30 mL  Total IN: 270 mL    OUT:    Chest Tube: 70 mL    Voided: 200 mL  Total OUT: 270 mL    Total NET: 0 mL          CAPILLARY BLOOD GLUCOSE          =======================  MEDICATIONS  ===================  MEDICATIONS  (STANDING):  atorvastatin 80 milliGRAM(s) Oral at bedtime  chlorhexidine 4% Liquid 1 Application(s) Topical daily  polyethylene glycol 3350 17 Gram(s) Oral at bedtime  piperacillin/tazobactam IVPB. 3.375 Gram(s) IV Intermittent every 8 hours  voriconazole 200 milliGRAM(s) Oral every 12 hours  sodium chloride 0.9%. 1000 milliLiter(s) (10 mL/Hr) IV Continuous <Continuous>  docusate sodium 100 milliGRAM(s) Oral two times a day  phenylephrine    Infusion 2 MICROgram(s)/kG/Min (36.375 mL/Hr) IV Continuous <Continuous>  ALBUTerol    90 MICROgram(s) HFA Inhaler 2 Puff(s) Inhalation every 6 hours  ipratropium 17 MICROgram(s) HFA Inhaler 1 Puff(s) Inhalation every 6 hours  aspirin  chewable 81 milliGRAM(s) Oral daily  diVALproex  milliGRAM(s) Oral two times a day  apixaban 5 milliGRAM(s) Oral every 12 hours  folic acid 1 milliGRAM(s) Oral daily  ferrous    sulfate 325 milliGRAM(s) Oral every 8 hours  predniSONE   Tablet 10 milliGRAM(s) Oral daily  pantoprazole    Tablet 40 milliGRAM(s) Oral two times a day before meals    MEDICATIONS  (PRN):  acetaminophen    Suspension 650 milliGRAM(s) Oral every 6 hours PRN For Temp greater than 38 C (100.4 F)  aluminum hydroxide/magnesium hydroxide/simethicone Suspension 30 milliLiter(s) Oral every 6 hours PRN Dyspepsia  oxyCODONE    IR 5 milliGRAM(s) Oral every 4 hours PRN Moderate Pain (4 - 6)  oxyCODONE    IR 10 milliGRAM(s) Oral every 4 hours PRN Severe Pain (7 - 10)  acetaminophen   Tablet. 650 milliGRAM(s) Oral every 6 hours PRN Mild Pain (1 - 3)      ======================VENTILATOR SETTINGS  ==============      =================== PATIENT CARE ACCESS DEVICES ==========  Peripheral IV  Central Venous Line	R	L	IJ	Fem	SC			Placed:   Arterial Line	R	L	PT	DP	Fem	Rad	Ax	Placed:   Midline:				  Urinary Catheter, Date Placed:   Necessity of urinary, arterial, and venous catheters discussed    ======================= PHYSICAL EXAM===================  General:                         Comfortable, Awake, alert, not in any distress  Neuro:                            Moving all extremities to commands. No focal deficits	  HEENT:                           GIOVANI/ ETT/ NGT/ trach  Respiratory:	Lungs clear on auscultation bilaterally with good aeration.                                           No rales, rhonchi, no wheezing. Effort even and unlabored.  CV:		Regular rate and rhythm. Normal S1/S2. No murmurs  Abdomen:	                     Soft,  nontender, not-distended. Bowel sounds present / absent.   Skin:		No rash.  Extremities:	Warm, no cyanosis or edema.  Palpable pulses    ============================ LABS =======================                        8.2    11.85 )-----------( 147      ( 02 Oct 2017 04:45 )             25.7     10-02    142  |  101  |  18  ----------------------------<  95  4.0   |  36<H>  |  0.90    Ca    8.0<L>      02 Oct 2017 04:45  Phos  2.6     10-02  Mg     2.1     10-02                  ===================== IMAGING STUDIES ===================  Radiology personally reviewed.    ====================ASSESSMENT AND PLAN ================      ====================== NEUROLOGY=======================  Pain control with PCA / PCEA / Tylenol IV / Toradol / Percocet  Pt is on Precedex for agitation  Pt is sedated with Propofol / Fentanyl    ==================== RESPIRATORY========================  Pt is on            L nasal canula / Face tent____% FiO2  Comfortable, no evidence of distress.  Using incentive spirometry & doing                ml  Monitor chest tube output  Chest tube to suction / water seal	    Mechanical Ventilation:    Mechanical ventilator status assessed & settings reviewed  Continue bronchodilators, pulmonary toilet  Head of bed elevation to 30-40 degrees    ====================CARDIOVASCULAR=====================  Continue hemodynamic monitoring/ telemetry  Not on any pressors  Continue cardiovascular / antihypertensive medications    ===================== RENAL ============================  Continue LR 30CC/hr      D/C IVF  Monitor I/Os, BUN/ Cr  and electrolytes  D/C Mock      Keep Mock  BPH: Continue Flomax/ Finasteride      ==================== GASTROINTESTINAL===================  On regular diet, tolerating well  Continue GI prophylaxis with Pepcid / Protonix  Continue Zofran / Reglan for nausea - PRN	  NPO    =======================    ENDOCRIN  =====================  Glycemic monitoring  F/S with coverage  ===================HEMATOLOGIC/ONCOLOGIC =============  Monitor chest tube output. No signs of active bleeding.   Follow CBC, coags  in AM  DVT prophylaxis with SCD, sc Heparin    ========================INFECTIOUS DISEASE===============  No signs of infection. Monitor for fever / leukocytosis.  All surgical incision / chest tube  sites look clean  D/C Mock      Pertinent clinical, laboratory, radiographic, hemodynamic, echocardiographic, respiratory data, microbiologic data and chart were reviewed and analyzed frequently throughout the course of the day and night. GI and DVT prophylaxis, glycemic control, head of bed elevation and skin care issues were addressed.  Patient seen, examined and plan discussed with CT Surgery / CTICU team during rounds.         I have spent               minutes of critical care time with this pt between            am/pm    and               am/ pm         minutes spent on total encounter; more than 50% of the visit was spent counseling and/or coordinating care by the attending physician.        KIM Robbins MD OSCAR CHACKO    MRN-[id number]    47 M PMH of sarcoidosis, HTN, COPD, and asthma who was initially admitted on 09/10 w cc of severe leg spasms X two days  In E.D. pt found to be wheezing, so he was seen by Pulmonary and started on IV Steroids for sarcoidosis  On 9/12 at 4 am, a RRT and Stroke codes were called as the patient became unresponsive.  He was transferred to MICU and then intubated for airway protection.   The patient was found to have a new infarct on MRI and also found to have a patent PFO on this admission as well. He was weaned off vent on 09/13 and transferred to the floors   There was concern patient was having paroxysmal afib, so he was started on Eliquis yesterday evening. He has received 2 doses of Eliquis on 09/19.   However, on 09/20, the patient had a coughing episode, which was not unusual for him, but during this episode he began to cough up fresh blood, about 1/2 cup of blood. He underwent Bronchoscopy with bronchoalveolar lavage of both sides and epi injection on 09/21 and was to have received further IR embolization for hemoptysis control in the next few days.  However, he developed hemoptysis again and underwent urgent LULectomy, complicated with post op bleeding.  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              Sarcoidosis on IV steroids              PFO              paroxysmal A fib              Anemia - acute posthemorrhagic              s/p massive transfusion       Home Medications:   * Incomplete Medication History as of 11-Sep-2017 09:23 documented in Structured Notes  · 	Azithromycin 5 Day Dose Pack 250 mg oral tablet: 1 dose(s) orally once a day for respiratory infection, Last Dose Taken:    · 	predniSONE 50 mg oral tablet: 1 tab(s) orally once a day , Last Dose Taken:    · 	albuterol 1.25 mg/3 mL (0.042%) inhalation solution: 3 milliliter(s) inhaled 3 times a day, Last Dose Taken:    · 	losartan 25 mg oral tablet: 1 tab(s) orally 2 times a day, Last Dose Taken:    · 	Symbicort 160 mcg-4.5 mcg/inh inhalation aerosol: 2 puff(s) inhaled 2 times a day, Last Dose Taken:    · 	Incruse Ellipta: 1 puff(s) inhaled once a day, Last Dose Taken:        Interval/Overnight Events/ ROS  Pt remained hemodynamically stable overnight, not on any pressors or inotropes. OOB to chair, breathing comfortably with minimal pain. Ambulated several times . Denies pain, no SOB, no palpitations, no nausea/ no vomiting, no dizziness  A-line and mock d/carmen         PAST MEDICAL & SURGICAL HISTORY:  History of pneumothorax: History of 3 prior pneumonthoracies.  COPD (chronic obstructive pulmonary disease)  Asthma  Hypertension  Sarcoidosis  No significant past surgical history    Allergies No Known Allergies      ***VITAL SIGNS:  Vital Signs Last 24 Hrs  T(C): 37 (02 Oct 2017 04:00), Max: 37.2 (01 Oct 2017 12:00)  T(F): 98.6 (02 Oct 2017 04:00), Max: 98.9 (01 Oct 2017 12:00)  HR: 100 (02 Oct 2017 09:17) (71 - 100)  BP: 132/73 (02 Oct 2017 09:00) (96/65 - 132/73)  BP(mean): 86 (02 Oct 2017 09:00) (70 - 109)  RR: 20 (02 Oct 2017 08:00) (14 - 25)  SpO2: 100% (02 Oct 2017 09:17) (96% - 100%)    I/Os:   I&O's Detail    01 Oct 2017 07:01  -  02 Oct 2017 07:00  --------------------------------------------------------  IN:    IV PiggyBack: 200 mL    Oral Fluid: 240 mL    sodium chloride 0.9%.: 230 mL  Total IN: 670 mL    OUT:    Chest Tube: 30 mL    Chest Tube: 230 mL    Voided: 1700 mL  Total OUT: 1960 mL    Total NET: -1290 mL      02 Oct 2017 07:01  -  02 Oct 2017 09:59  --------------------------------------------------------  IN:    Oral Fluid: 240 mL    sodium chloride 0.9%.: 30 mL  Total IN: 270 mL    OUT:    Chest Tube: 70 mL    Voided: 200 mL  Total OUT: 270 mL    Total NET: 0 mL          =======================  MEDICATIONS  ===================  MEDICATIONS  (STANDING):  atorvastatin 80 milliGRAM(s) Oral at bedtime  chlorhexidine 4% Liquid 1 Application(s) Topical daily  polyethylene glycol 3350 17 Gram(s) Oral at bedtime  piperacillin/tazobactam IVPB. 3.375 Gram(s) IV Intermittent every 8 hours  voriconazole 200 milliGRAM(s) Oral every 12 hours  sodium chloride 0.9%. 1000 milliLiter(s) (10 mL/Hr) IV Continuous <Continuous>  docusate sodium 100 milliGRAM(s) Oral two times a day  phenylephrine    Infusion 2 MICROgram(s)/kG/Min (36.375 mL/Hr) IV Continuous <Continuous>  ALBUTerol    90 MICROgram(s) HFA Inhaler 2 Puff(s) Inhalation every 6 hours  ipratropium 17 MICROgram(s) HFA Inhaler 1 Puff(s) Inhalation every 6 hours  aspirin  chewable 81 milliGRAM(s) Oral daily  diVALproex  milliGRAM(s) Oral two times a day  apixaban 5 milliGRAM(s) Oral every 12 hours  folic acid 1 milliGRAM(s) Oral daily  ferrous    sulfate 325 milliGRAM(s) Oral every 8 hours  predniSONE   Tablet 10 milliGRAM(s) Oral daily  pantoprazole    Tablet 40 milliGRAM(s) Oral two times a day before meals    MEDICATIONS  (PRN):  acetaminophen    Suspension 650 milliGRAM(s) Oral every 6 hours PRN For Temp greater than 38 C (100.4 F)  aluminum hydroxide/magnesium hydroxide/simethicone Suspension 30 milliLiter(s) Oral every 6 hours PRN Dyspepsia  oxyCODONE    IR 5 milliGRAM(s) Oral every 4 hours PRN Moderate Pain (4 - 6)  oxyCODONE    IR 10 milliGRAM(s) Oral every 4 hours PRN Severe Pain (7 - 10)  acetaminophen   Tablet. 650 milliGRAM(s) Oral every 6 hours PRN Mild Pain (1 - 3)        =================== PATIENT CARE ACCESS DEVICES ==========  Peripheral IV (+)   Central Venous Line	R	L	IJ	Fem	SC			Placed:   Arterial Line	R	L	PT	DP	Fem	Rad	Ax	Placed:   Midline:				  Urinary Catheter, Date Placed:   Necessity of urinary, arterial, and venous catheters discussed    ======================= PHYSICAL EXAM===================  General:                         Comfortable, Awake, alert, not in any distress  Neuro:                            Moving all extremities to commands. No focal deficits	  HEENT:                           GIOVANI/ ETT/ NGT/ trach  Respiratory:	Lungs clear on auscultation bilaterally with good aeration.                                           No rales, rhonchi, no wheezing. Effort even and unlabored.  CV:		Regular rate and rhythm. Normal S1/S2. No murmurs  Abdomen:	                     Soft,  nontender, not-distended. Bowel sounds present / absent.   Skin:		No rash.  Extremities:	Warm, no cyanosis or edema.  Palpable pulses    ============================ LABS =======================                        8.2    11.85 )-----------( 147      ( 02 Oct 2017 04:45 )             25.7     10-02    142  |  101  |  18  ----------------------------<  95  4.0   |  36<H>  |  0.90    Ca    8.0<L>      02 Oct 2017 04:45  Phos  2.6     10-02  Mg     2.1     10-02                  ===================== IMAGING STUDIES ===================  Radiology personally reviewed.    ====================ASSESSMENT AND PLAN ================      ====================== NEUROLOGY=======================  Pain control with PCA / PCEA / Tylenol IV / Toradol / Percocet  Pt is on Precedex for agitation  Pt is sedated with Propofol / Fentanyl    ==================== RESPIRATORY========================  Pt is on            L nasal canula / Face tent____% FiO2  Comfortable, no evidence of distress.  Using incentive spirometry & doing                ml  Monitor chest tube output  Chest tube to suction / water seal	    Mechanical Ventilation:    Mechanical ventilator status assessed & settings reviewed  Continue bronchodilators, pulmonary toilet  Head of bed elevation to 30-40 degrees    ====================CARDIOVASCULAR=====================  Continue hemodynamic monitoring/ telemetry  Not on any pressors  Continue cardiovascular / antihypertensive medications    ===================== RENAL ============================  Continue LR 30CC/hr      D/C IVF  Monitor I/Os, BUN/ Cr  and electrolytes  D/C Mock      Keep Mock  BPH: Continue Flomax/ Finasteride      ==================== GASTROINTESTINAL===================  On regular diet, tolerating well  Continue GI prophylaxis with Pepcid / Protonix  Continue Zofran / Reglan for nausea - PRN	  NPO    =======================    ENDOCRIN  =====================  Glycemic monitoring  F/S with coverage  ===================HEMATOLOGIC/ONCOLOGIC =============  Monitor chest tube output. No signs of active bleeding.   Follow CBC, coags  in AM  DVT prophylaxis with SCD, sc Heparin    ========================INFECTIOUS DISEASE===============  No signs of infection. Monitor for fever / leukocytosis.  All surgical incision / chest tube  sites look clean  D/C Mock      Pertinent clinical, laboratory, radiographic, hemodynamic, echocardiographic, respiratory data, microbiologic data and chart were reviewed and analyzed frequently throughout the course of the day and night. GI and DVT prophylaxis, glycemic control, head of bed elevation and skin care issues were addressed.  Patient seen, examined and plan discussed with CT Surgery / CTICU team during rounds.         I have spent               minutes of critical care time with this pt between            am/pm    and               am/ pm         minutes spent on total encounter; more than 50% of the visit was spent counseling and/or coordinating care by the attending physician.        KIM Robbins MD OSCAR CHACKO    MRN-[id number]    47 M PMH of sarcoidosis, HTN, COPD, and asthma who was initially admitted on 09/10 w cc of severe leg spasms X two days  In E.D. pt found to be wheezing, so he was seen by Pulmonary and started on IV Steroids for sarcoidosis  On 9/12 at 4 am, a RRT and Stroke codes were called as the patient became unresponsive.  He was transferred to MICU and then intubated for airway protection.   The patient was found to have a new infarct on MRI and also found to have a patent PFO on this admission as well. He was weaned off vent on 09/13 and transferred to the floors   There was concern patient was having paroxysmal afib, so he was started on Eliquis yesterday evening. He has received 2 doses of Eliquis on 09/19.   However, on 09/20, the patient had a coughing episode, which was not unusual for him, but during this episode he began to cough up fresh blood, about 1/2 cup of blood. He underwent Bronchoscopy with bronchoalveolar lavage of both sides and epi injection on 09/21 and was to have received further IR embolization for hemoptysis control in the next few days.  However, he developed hemoptysis again and underwent urgent LULectomy, complicated with post op bleeding.  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              Sarcoidosis on IV steroids              PFO              paroxysmal A fib              Anemia - acute posthemorrhagic              s/p massive transfusion       Home Medications:   * Incomplete Medication History as of 11-Sep-2017 09:23 documented in Structured Notes  · 	Azithromycin 5 Day Dose Pack 250 mg oral tablet: 1 dose(s) orally once a day for respiratory infection, Last Dose Taken:    · 	predniSONE 50 mg oral tablet: 1 tab(s) orally once a day , Last Dose Taken:    · 	albuterol 1.25 mg/3 mL (0.042%) inhalation solution: 3 milliliter(s) inhaled 3 times a day, Last Dose Taken:    · 	losartan 25 mg oral tablet: 1 tab(s) orally 2 times a day, Last Dose Taken:    · 	Symbicort 160 mcg-4.5 mcg/inh inhalation aerosol: 2 puff(s) inhaled 2 times a day, Last Dose Taken:    · 	Incruse Ellipta: 1 puff(s) inhaled once a day, Last Dose Taken:        Interval/Overnight Events/ ROS  Pt remained hemodynamically stable overnight, not on any pressors or inotropes. OOB to chair, breathing comfortably with minimal pain. Ambulated several times . Denies pain, no SOB, no palpitations, no nausea/ no vomiting, no dizziness  A-line and mock d/cramen         PAST MEDICAL & SURGICAL HISTORY:  History of pneumothorax: History of 3 prior pneumonthoracies.  COPD (chronic obstructive pulmonary disease)  Asthma  Hypertension  Sarcoidosis  No significant past surgical history    Allergies No Known Allergies      ***VITAL SIGNS:  Vital Signs Last 24 Hrs  T(C): 37 (02 Oct 2017 04:00), Max: 37.2 (01 Oct 2017 12:00)  T(F): 98.6 (02 Oct 2017 04:00), Max: 98.9 (01 Oct 2017 12:00)  HR: 100 (02 Oct 2017 09:17) (71 - 100)  BP: 132/73 (02 Oct 2017 09:00) (96/65 - 132/73)  BP(mean): 86 (02 Oct 2017 09:00) (70 - 109)  RR: 20 (02 Oct 2017 08:00) (14 - 25)  SpO2: 100% (02 Oct 2017 09:17) (96% - 100%)    I/Os:   I&O's Detail    01 Oct 2017 07:01  -  02 Oct 2017 07:00  --------------------------------------------------------  IN:    IV PiggyBack: 200 mL    Oral Fluid: 240 mL    sodium chloride 0.9%.: 230 mL  Total IN: 670 mL    OUT:    Chest Tube: 30 mL    Chest Tube: 230 mL    Voided: 1700 mL  Total OUT: 1960 mL    Total NET: -1290 mL      02 Oct 2017 07:01  -  02 Oct 2017 09:59  --------------------------------------------------------  IN:    Oral Fluid: 240 mL    sodium chloride 0.9%.: 30 mL  Total IN: 270 mL    OUT:    Chest Tube: 70 mL    Voided: 200 mL  Total OUT: 270 mL    Total NET: 0 mL          =======================  MEDICATIONS  ===================  MEDICATIONS  (STANDING):  atorvastatin 80 milliGRAM(s) Oral at bedtime  chlorhexidine 4% Liquid 1 Application(s) Topical daily  polyethylene glycol 3350 17 Gram(s) Oral at bedtime  piperacillin/tazobactam IVPB. 3.375 Gram(s) IV Intermittent every 8 hours  voriconazole 200 milliGRAM(s) Oral every 12 hours  sodium chloride 0.9%. 1000 milliLiter(s) (10 mL/Hr) IV Continuous <Continuous>  docusate sodium 100 milliGRAM(s) Oral two times a day  phenylephrine    Infusion 2 MICROgram(s)/kG/Min (36.375 mL/Hr) IV Continuous <Continuous>  ALBUTerol    90 MICROgram(s) HFA Inhaler 2 Puff(s) Inhalation every 6 hours  ipratropium 17 MICROgram(s) HFA Inhaler 1 Puff(s) Inhalation every 6 hours  aspirin  chewable 81 milliGRAM(s) Oral daily  diVALproex  milliGRAM(s) Oral two times a day  apixaban 5 milliGRAM(s) Oral every 12 hours  folic acid 1 milliGRAM(s) Oral daily  ferrous    sulfate 325 milliGRAM(s) Oral every 8 hours  predniSONE   Tablet 10 milliGRAM(s) Oral daily  pantoprazole    Tablet 40 milliGRAM(s) Oral two times a day before meals    MEDICATIONS  (PRN):  acetaminophen    Suspension 650 milliGRAM(s) Oral every 6 hours PRN For Temp greater than 38 C (100.4 F)  aluminum hydroxide/magnesium hydroxide/simethicone Suspension 30 milliLiter(s) Oral every 6 hours PRN Dyspepsia  oxyCODONE    IR 5 milliGRAM(s) Oral every 4 hours PRN Moderate Pain (4 - 6)  oxyCODONE    IR 10 milliGRAM(s) Oral every 4 hours PRN Severe Pain (7 - 10)  acetaminophen   Tablet. 650 milliGRAM(s) Oral every 6 hours PRN Mild Pain (1 - 3)        =================== PATIENT CARE ACCESS DEVICES ==========  Peripheral IV (+)     ======================= PHYSICAL EXAM===================  General:           Comfortable, Awake, alert, not in any distress  Neuro:             Moving all extremities to commands. LUE weakness improving	  HEENT:            GIOVANI  Respiratory:     Lungs clear on auscultation bilaterally with good aeration.                          Few rhonchi, no wheezing. Effort even and unlabored.                         Left thoracotomy  site - clean  CV:	           Regular rate and rhythm. Normal S1/S2. No murmurs  Abdomen:        Soft,  nontender, not-distended. Bowel sounds present / absent.   Skin:		No rash.  Extremities:	Warm, no cyanosis or edema.  Palpable pulses    ============================ LABS =======================                        8.2    11.85 )-----------( 147      ( 02 Oct 2017 04:45 )             25.7     10-02    142  |  101  |  18  ----------------------------<  95  4.0   |  36<H>  |  0.90    Ca    8.0<L>      02 Oct 2017 04:45  Phos  2.6     10-02  Mg     2.1     10-02                  ===================== IMAGING STUDIES ===================  Radiology personally reviewed.    ====================ASSESSMENT AND PLAN ================      ====================== NEUROLOGY=======================  Pain control with PCA / PCEA / Tylenol IV / Toradol / Percocet  Pt is on Precedex for agitation  Pt is sedated with Propofol / Fentanyl    ==================== RESPIRATORY========================  Pt is on            L nasal canula / Face tent____% FiO2  Comfortable, no evidence of distress.  Using incentive spirometry & doing                ml  Monitor chest tube output  Chest tube to suction / water seal	    Mechanical Ventilation:    Mechanical ventilator status assessed & settings reviewed  Continue bronchodilators, pulmonary toilet  Head of bed elevation to 30-40 degrees    ====================CARDIOVASCULAR=====================  Continue hemodynamic monitoring/ telemetry  Not on any pressors  Continue cardiovascular / antihypertensive medications    ===================== RENAL ============================  Continue LR 30CC/hr      D/C IVF  Monitor I/Os, BUN/ Cr  and electrolytes  D/C Mock      Keep Mock  BPH: Continue Flomax/ Finasteride      ==================== GASTROINTESTINAL===================  On regular diet, tolerating well  Continue GI prophylaxis with Pepcid / Protonix  Continue Zofran / Reglan for nausea - PRN	  NPO    =======================    ENDOCRIN  =====================  Glycemic monitoring  F/S with coverage  ===================HEMATOLOGIC/ONCOLOGIC =============  Monitor chest tube output. No signs of active bleeding.   Follow CBC, coags  in AM  DVT prophylaxis with SCD, sc Heparin    ========================INFECTIOUS DISEASE===============  No signs of infection. Monitor for fever / leukocytosis.  All surgical incision / chest tube  sites look clean  D/C Mock      Pertinent clinical, laboratory, radiographic, hemodynamic, echocardiographic, respiratory data, microbiologic data and chart were reviewed and analyzed frequently throughout the course of the day and night. GI and DVT prophylaxis, glycemic control, head of bed elevation and skin care issues were addressed.  Patient seen, examined and plan discussed with CT Surgery / CTICU team during rounds.         I have spent               minutes of critical care time with this pt between            am/pm    and               am/ pm         minutes spent on total encounter; more than 50% of the visit was spent counseling and/or coordinating care by the attending physician.        KIM Robbins MD [facility]    [id number]    47 M PMH of sarcoidosis, HTN, COPD, and asthma who was initially admitted on 09/10 w cc of severe leg spasms X two days  In E.D. pt found to be wheezing, so he was seen by Pulmonary and started on IV Steroids for sarcoidosis  On 9/12 at 4 am, a RRT and Stroke codes were called as the patient became unresponsive.  He was transferred to MICU and then intubated for airway protection.   The patient was found to have a new infarct on MRI and also found to have a patent PFO on this admission as well. He was weaned off vent on 09/13 and transferred to the floors   There was concern patient was having paroxysmal afib, so he was started on Eliquis yesterday evening. He has received 2 doses of Eliquis on 09/19.   However, on 09/20, the patient had a coughing episode, which was not unusual for him, but during this episode he began to cough up fresh blood, about 1/2 cup of blood. He underwent Bronchoscopy with bronchoalveolar lavage of both sides and epi injection on 09/21 and was to have received further IR embolization for hemoptysis control in the next few days.  However, he developed hemoptysis again and underwent urgent LULectomy, complicated with post op bleeding.  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              Sarcoidosis on IV steroids              PFO              s/p CVA with left hemiparesis              paroxysmal A fib              Anemia - acute posthemorrhagic              s/p massive transfusion       Home Medications:   * Incomplete Medication History as of 11-Sep-2017 09:23 documented in Structured Notes  · 	Azithromycin 5 Day Dose Pack 250 mg oral tablet: 1 dose(s) orally once a day for respiratory infection, Last Dose Taken:    · 	predniSONE 50 mg oral tablet: 1 tab(s) orally once a day , Last Dose Taken:    · 	albuterol 1.25 mg/3 mL (0.042%) inhalation solution: 3 milliliter(s) inhaled 3 times a day, Last Dose Taken:    · 	losartan 25 mg oral tablet: 1 tab(s) orally 2 times a day, Last Dose Taken:    · 	Symbicort 160 mcg-4.5 mcg/inh inhalation aerosol: 2 puff(s) inhaled 2 times a day, Last Dose Taken:    · 	Incruse Ellipta: 1 puff(s) inhaled once a day, Last Dose Taken:        Interval/Overnight Events/ ROS  Pt remained hemodynamically stable overnight, not on any pressors or inotropes. OOB to chair, breathing comfortably with minimal pain. Ambulated several times . Denies pain, no SOB, no palpitations, no nausea/ no vomiting, no dizziness  A-line and mock d/carmen         PAST MEDICAL & SURGICAL HISTORY:  History of pneumothorax: History of 3 prior pneumonthoracies.  COPD (chronic obstructive pulmonary disease)  Asthma  Hypertension  Sarcoidosis  No significant past surgical history    Allergies No Known Allergies      ***VITAL SIGNS:  Vital Signs Last 24 Hrs  T(C): 37 (02 Oct 2017 04:00), Max: 37.2 (01 Oct 2017 12:00)  T(F): 98.6 (02 Oct 2017 04:00), Max: 98.9 (01 Oct 2017 12:00)  HR: 100 (02 Oct 2017 09:17) (71 - 100)  BP: 132/73 (02 Oct 2017 09:00) (96/65 - 132/73)  BP(mean): 86 (02 Oct 2017 09:00) (70 - 109)  RR: 20 (02 Oct 2017 08:00) (14 - 25)  SpO2: 100% (02 Oct 2017 09:17) (96% - 100%)    I/Os:   I&O's Detail    01 Oct 2017 07:01  -  02 Oct 2017 07:00  --------------------------------------------------------  IN:    IV PiggyBack: 200 mL    Oral Fluid: 240 mL    sodium chloride 0.9%.: 230 mL  Total IN: 670 mL    OUT:    Chest Tube: 30 mL    Chest Tube: 230 mL    Voided: 1700 mL  Total OUT: 1960 mL    Total NET: -1290 mL      02 Oct 2017 07:01  -  02 Oct 2017 09:59  --------------------------------------------------------  IN:    Oral Fluid: 240 mL    sodium chloride 0.9%.: 30 mL  Total IN: 270 mL    OUT:    Chest Tube: 70 mL    Voided: 200 mL  Total OUT: 270 mL    Total NET: 0 mL          =======================  MEDICATIONS  ===================  MEDICATIONS  (STANDING):  atorvastatin 80 milliGRAM(s) Oral at bedtime  chlorhexidine 4% Liquid 1 Application(s) Topical daily  polyethylene glycol 3350 17 Gram(s) Oral at bedtime  piperacillin/tazobactam IVPB. 3.375 Gram(s) IV Intermittent every 8 hours  voriconazole 200 milliGRAM(s) Oral every 12 hours  sodium chloride 0.9%. 1000 milliLiter(s) (10 mL/Hr) IV Continuous <Continuous>  docusate sodium 100 milliGRAM(s) Oral two times a day  phenylephrine    Infusion 2 MICROgram(s)/kG/Min (36.375 mL/Hr) IV Continuous <Continuous>  ALBUTerol    90 MICROgram(s) HFA Inhaler 2 Puff(s) Inhalation every 6 hours  ipratropium 17 MICROgram(s) HFA Inhaler 1 Puff(s) Inhalation every 6 hours  aspirin  chewable 81 milliGRAM(s) Oral daily  diVALproex  milliGRAM(s) Oral two times a day  apixaban 5 milliGRAM(s) Oral every 12 hours  folic acid 1 milliGRAM(s) Oral daily  ferrous    sulfate 325 milliGRAM(s) Oral every 8 hours  predniSONE   Tablet 10 milliGRAM(s) Oral daily  pantoprazole    Tablet 40 milliGRAM(s) Oral two times a day before meals    MEDICATIONS  (PRN):  acetaminophen    Suspension 650 milliGRAM(s) Oral every 6 hours PRN For Temp greater than 38 C (100.4 F)  aluminum hydroxide/magnesium hydroxide/simethicone Suspension 30 milliLiter(s) Oral every 6 hours PRN Dyspepsia  oxyCODONE    IR 5 milliGRAM(s) Oral every 4 hours PRN Moderate Pain (4 - 6)  oxyCODONE    IR 10 milliGRAM(s) Oral every 4 hours PRN Severe Pain (7 - 10)  acetaminophen   Tablet. 650 milliGRAM(s) Oral every 6 hours PRN Mild Pain (1 - 3)        =================== PATIENT CARE ACCESS DEVICES ==========  Peripheral IV (+)     ======================= PHYSICAL EXAM===================  General:           Comfortable, Awake, alert, not in any distress  Neuro:             Moving all extremities to commands. LUE weakness improving	  HEENT:            GIOVANI  Respiratory:     Lungs clear on auscultation bilaterally with good aeration.                          Few rhonchi, no wheezing. Effort even and unlabored.                         Left thoracotomy  site - clean  CV:	           Regular rate and rhythm. Normal S1/S2. No murmurs  Abdomen:        Soft,  nontender, not-distended. Bowel sounds present / absent.   Skin:		No rash.  Extremities:	Warm, no cyanosis or edema.  Palpable pulses    ============================ LABS =======================                        8.2    11.85 )-----------( 147      ( 02 Oct 2017 04:45 )             25.7     10-02    142  |  101  |  18  ----------------------------<  95  4.0   |  36<H>  |  0.90    Ca    8.0<L>      02 Oct 2017 04:45  Phos  2.6     10-02  Mg     2.1     10-02                  ===================== IMAGING STUDIES ===================  Radiology personally reviewed.    CXR   Oct  1 2017   INTERPRETATION:  Clinical information: Sarcoidosis with hemoptysis.   Status post thoracotomy.  Findings: Portable frontal view of the chest dated 10/1/2017 is compared  to 9/30/2017. Postsurgical changes in the left lung are stable. 2 left   chest tubes and left subclavian central line are unchanged. Bilateral   upper lobe scarring is stable. There is no pneumothorax.   Cardiomediastinal silhouette cannot be evaluated.    Impression: No pneumothorax.  Stable bilateral upper lobe scarring secondary to sarcoidosis.        MRI 9/11/17  brain showed bilateral (R>L) MCA and right PCA infarcts in the frontoparietal and occipital areas which could be cardioembolic in source.    ====================ASSESSMENT AND PLAN ================      ====================== NEUROLOGY=======================  Pain control with PCA / PCEA / Tylenol IV / Toradol / Percocet  Pt is on Precedex for agitation  Pt is sedated with Propofol / Fentanyl    ==================== RESPIRATORY========================  Pt is on            L nasal canula / Face tent____% FiO2  Comfortable, no evidence of distress.  Using incentive spirometry & doing                ml  Monitor chest tube output  Chest tube to suction / water seal	    Mechanical Ventilation:    Mechanical ventilator status assessed & settings reviewed  Continue bronchodilators, pulmonary toilet  Head of bed elevation to 30-40 degrees    ====================CARDIOVASCULAR=====================  Continue hemodynamic monitoring/ telemetry  Not on any pressors  Continue cardiovascular / antihypertensive medications    ===================== RENAL ============================  Continue LR 30CC/hr      D/C IVF  Monitor I/Os, BUN/ Cr  and electrolytes  D/C Mock      Keep Mock  BPH: Continue Flomax/ Finasteride      ==================== GASTROINTESTINAL===================  On regular diet, tolerating well  Continue GI prophylaxis with Pepcid / Protonix  Continue Zofran / Reglan for nausea - PRN	  NPO    =======================    ENDOCRIN  =====================  Glycemic monitoring  F/S with coverage  ===================HEMATOLOGIC/ONCOLOGIC =============  Monitor chest tube output. No signs of active bleeding.   Follow CBC, coags  in AM  DVT prophylaxis with SCD, sc Heparin    ========================INFECTIOUS DISEASE===============  No signs of infection. Monitor for fever / leukocytosis.  All surgical incision / chest tube  sites look clean  D/C Mock      Pertinent clinical, laboratory, radiographic, hemodynamic, echocardiographic, respiratory data, microbiologic data and chart were reviewed and analyzed frequently throughout the course of the day and night. GI and DVT prophylaxis, glycemic control, head of bed elevation and skin care issues were addressed.  Patient seen, examined and plan discussed with CT Surgery / CTICU team during rounds.         I have spent               minutes of critical care time with this pt between            am/pm    and               am/ pm         minutes spent on total encounter; more than 50% of the visit was spent counseling and/or coordinating care by the attending physician.        KIM Robbins MD [facility]    MRN-[id number]    47 M PMH of sarcoidosis, HTN, COPD, and asthma who was initially admitted on 09/10 w cc of severe leg spasms X two days  In E.D. pt found to be wheezing, so he was seen by Pulmonary and started on IV Steroids for sarcoidosis  On 9/12 at 4 am, a RRT and Stroke codes were called as the patient became unresponsive.  He was transferred to MICU and then intubated for airway protection.   The patient was found to have a new infarct on MRI and also found to have a patent PFO on this admission as well. He was weaned off vent on 09/13 and transferred to the floors   There was concern patient was having paroxysmal afib, so he was started on Eliquis yesterday evening. He has received 2 doses of Eliquis on 09/19.   However, on 09/20, the patient had a coughing episode, which was not unusual for him, but during this episode he began to cough up fresh blood, about 1/2 cup of blood. He underwent Bronchoscopy with bronchoalveolar lavage of both sides and epi injection on 09/21 and was to have received further IR embolization for hemoptysis control in the next few days.  However, he developed hemoptysis again and underwent urgent LULectomy, complicated with post op bleeding.  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              Sarcoidosis on IV steroids              PFO              s/p CVA with left hemiparesis              paroxysmal A fib              Anemia - acute posthemorrhagic              s/p massive transfusion       Home Medications:   * Incomplete Medication History as of 11-Sep-2017 09:23 documented in Structured Notes  · 	Azithromycin 5 Day Dose Pack 250 mg oral tablet: 1 dose(s) orally once a day for respiratory infection, Last Dose Taken:    · 	predniSONE 50 mg oral tablet: 1 tab(s) orally once a day , Last Dose Taken:    · 	albuterol 1.25 mg/3 mL (0.042%) inhalation solution: 3 milliliter(s) inhaled 3 times a day, Last Dose Taken:    · 	losartan 25 mg oral tablet: 1 tab(s) orally 2 times a day, Last Dose Taken:    · 	Symbicort 160 mcg-4.5 mcg/inh inhalation aerosol: 2 puff(s) inhaled 2 times a day, Last Dose Taken:    · 	Incruse Ellipta: 1 puff(s) inhaled once a day, Last Dose Taken:        Interval/Overnight Events/ ROS  Pt remained hemodynamically stable overnight, not on any pressors or inotropes. OOB to chair, breathing comfortably with minimal pain. Ambulated several times . Denies pain, no SOB, no palpitations, no nausea/ no vomiting, no dizziness  A-line and mock d/carmen         PAST MEDICAL & SURGICAL HISTORY:  History of pneumothorax: History of 3 prior pneumonthoracies.  COPD (chronic obstructive pulmonary disease)  Asthma  Hypertension  Sarcoidosis  No significant past surgical history    Allergies No Known Allergies      ***VITAL SIGNS:  Vital Signs Last 24 Hrs  T(C): 37 (02 Oct 2017 04:00), Max: 37.2 (01 Oct 2017 12:00)  T(F): 98.6 (02 Oct 2017 04:00), Max: 98.9 (01 Oct 2017 12:00)  HR: 100 (02 Oct 2017 09:17) (71 - 100)  BP: 132/73 (02 Oct 2017 09:00) (96/65 - 132/73)  BP(mean): 86 (02 Oct 2017 09:00) (70 - 109)  RR: 20 (02 Oct 2017 08:00) (14 - 25)  SpO2: 100% (02 Oct 2017 09:17) (96% - 100%)    I/Os:   I&O's Detail    01 Oct 2017 07:01  -  02 Oct 2017 07:00  --------------------------------------------------------  IN:    IV PiggyBack: 200 mL    Oral Fluid: 240 mL    sodium chloride 0.9%.: 230 mL  Total IN: 670 mL    OUT:    Chest Tube: 30 mL    Chest Tube: 230 mL    Voided: 1700 mL  Total OUT: 1960 mL    Total NET: -1290 mL      02 Oct 2017 07:01  -  02 Oct 2017 09:59  --------------------------------------------------------  IN:    Oral Fluid: 240 mL    sodium chloride 0.9%.: 30 mL  Total IN: 270 mL    OUT:    Chest Tube: 70 mL    Voided: 200 mL  Total OUT: 270 mL    Total NET: 0 mL          =======================  MEDICATIONS  ===================  MEDICATIONS  (STANDING):  atorvastatin 80 milliGRAM(s) Oral at bedtime  chlorhexidine 4% Liquid 1 Application(s) Topical daily  polyethylene glycol 3350 17 Gram(s) Oral at bedtime  piperacillin/tazobactam IVPB. 3.375 Gram(s) IV Intermittent every 8 hours  voriconazole 200 milliGRAM(s) Oral every 12 hours  sodium chloride 0.9%. 1000 milliLiter(s) (10 mL/Hr) IV Continuous <Continuous>  docusate sodium 100 milliGRAM(s) Oral two times a day  phenylephrine    Infusion 2 MICROgram(s)/kG/Min (36.375 mL/Hr) IV Continuous <Continuous>  ALBUTerol    90 MICROgram(s) HFA Inhaler 2 Puff(s) Inhalation every 6 hours  ipratropium 17 MICROgram(s) HFA Inhaler 1 Puff(s) Inhalation every 6 hours  aspirin  chewable 81 milliGRAM(s) Oral daily  diVALproex  milliGRAM(s) Oral two times a day  apixaban 5 milliGRAM(s) Oral every 12 hours  folic acid 1 milliGRAM(s) Oral daily  ferrous    sulfate 325 milliGRAM(s) Oral every 8 hours  predniSONE   Tablet 10 milliGRAM(s) Oral daily  pantoprazole    Tablet 40 milliGRAM(s) Oral two times a day before meals    MEDICATIONS  (PRN):  acetaminophen    Suspension 650 milliGRAM(s) Oral every 6 hours PRN For Temp greater than 38 C (100.4 F)  aluminum hydroxide/magnesium hydroxide/simethicone Suspension 30 milliLiter(s) Oral every 6 hours PRN Dyspepsia  oxyCODONE    IR 5 milliGRAM(s) Oral every 4 hours PRN Moderate Pain (4 - 6)  oxyCODONE    IR 10 milliGRAM(s) Oral every 4 hours PRN Severe Pain (7 - 10)  acetaminophen   Tablet. 650 milliGRAM(s) Oral every 6 hours PRN Mild Pain (1 - 3)        =================== PATIENT CARE ACCESS DEVICES ==========  Peripheral IV (+)     ======================= PHYSICAL EXAM===================  General:           Comfortable, Awake, alert, not in any distress  Neuro:             Moving all extremities to commands. LUE weakness improving	  HEENT:            GIOVANI  Respiratory:     Lungs clear on auscultation bilaterally with good aeration.                          Few rhonchi, no wheezing. Effort even and unlabored.                         Left thoracotomy  site - clean  CV:	           Regular rate and rhythm. Normal S1/S2. No murmurs  Abdomen:        Soft,  nontender, not-distended. Bowel sounds present / absent.   Skin:		No rash.  Extremities:	Warm, no cyanosis or edema.  Palpable pulses    ============================ LABS =======================                        8.2    11.85 )-----------( 147      ( 02 Oct 2017 04:45 )             25.7     10-02    142  |  101  |  18  ----------------------------<  95  4.0   |  36<H>  |  0.90    Ca    8.0<L>      02 Oct 2017 04:45  Phos  2.6     10-02  Mg     2.1     10-02        ===================== IMAGING STUDIES ===================  Radiology personally reviewed.    CXR   Oct  1 2017   INTERPRETATION:  Clinical information: Sarcoidosis with hemoptysis.   Status post thoracotomy.  Findings: Portable frontal view of the chest dated 10/1/2017 is compared  to 9/30/2017. Postsurgical changes in the left lung are stable. 2 left   chest tubes and left subclavian central line are unchanged. Bilateral   upper lobe scarring is stable. There is no pneumothorax.   Cardiomediastinal silhouette cannot be evaluated.    Impression: No pneumothorax.  Stable bilateral upper lobe scarring secondary to sarcoidosis.        MRI 9/13/17  brain showed bilateral (R>L) MCA and right PCA infarcts in the frontoparietal and occipital areas which could be cardioembolic in source.    ====================ASSESSMENT AND PLAN ================      ====================== NEUROLOGY=======================  Pain control with PCA / PCEA / Tylenol IV / Toradol / Percocet  Pt is on Precedex for agitation  Pt is sedated with Propofol / Fentanyl    ==================== RESPIRATORY========================  Pt is on            L nasal canula / Face tent____% FiO2  Comfortable, no evidence of distress.  Using incentive spirometry & doing                ml  Monitor chest tube output  Chest tube to suction / water seal	    Mechanical Ventilation:    Mechanical ventilator status assessed & settings reviewed  Continue bronchodilators, pulmonary toilet  Head of bed elevation to 30-40 degrees    ====================CARDIOVASCULAR=====================  Continue hemodynamic monitoring/ telemetry  Not on any pressors  Continue cardiovascular / antihypertensive medications    ===================== RENAL ============================  Continue LR 30CC/hr      D/C IVF  Monitor I/Os, BUN/ Cr  and electrolytes  D/C Mock      Keep Mock  BPH: Continue Flomax/ Finasteride      ==================== GASTROINTESTINAL===================  On regular diet, tolerating well  Continue GI prophylaxis with Pepcid / Protonix  Continue Zofran / Reglan for nausea - PRN	  NPO    =======================    ENDOCRIN  =====================  Glycemic monitoring  F/S with coverage  ===================HEMATOLOGIC/ONCOLOGIC =============  Monitor chest tube output. No signs of active bleeding.   Follow CBC, coags  in AM  DVT prophylaxis with SCD, sc Heparin    ========================INFECTIOUS DISEASE===============  No signs of infection. Monitor for fever / leukocytosis.  All surgical incision / chest tube  sites look clean  D/C Mock      Pertinent clinical, laboratory, radiographic, hemodynamic, echocardiographic, respiratory data, microbiologic data and chart were reviewed and analyzed frequently throughout the course of the day and night. GI and DVT prophylaxis, glycemic control, head of bed elevation and skin care issues were addressed.  Patient seen, examined and plan discussed with CT Surgery / CTICU team during rounds.         I have spent               minutes of critical care time with this pt between            am/pm    and               am/ pm         minutes spent on total encounter; more than 50% of the visit was spent counseling and/or coordinating care by the attending physician.        KIM Robbins MD [facility]    MRN-[id number]    47 M PMH of sarcoidosis, HTN, COPD, and asthma who was initially admitted on 09/10 w cc of severe leg spasms X two days  In E.D. pt found to be wheezing, so he was seen by Pulmonary and started on IV Steroids for sarcoidosis  On 9/12 at 4 am, a RRT and Stroke codes were called as the patient became unresponsive.  He was transferred to MICU and then intubated for airway protection.   The patient was found to have a new infarct on MRI and also found to have a patent PFO on this admission as well. He was weaned off vent on 09/13 and transferred to the floors   There was concern patient was having paroxysmal afib, so he was started on Eliquis yesterday evening. He has received 2 doses of Eliquis on 09/19.   However, on 09/20, the patient had a coughing episode, which was not unusual for him, but during this episode he began to cough up fresh blood, about 1/2 cup of blood. He underwent Bronchoscopy with bronchoalveolar lavage of both sides and epi injection on 09/21 and was to have received further IR embolization for hemoptysis control in the next few days.  However, he developed hemoptysis again and underwent urgent LULectomy, complicated with post op bleeding.  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              Sarcoidosis on IV steroids              PFO              s/p CVA with left hemiparesis              seizure dx on Valproic acid              paroxysmal A fib              Anemia - acute posthemorrhagic              s/p massive transfusion       Home Medications:   * Incomplete Medication History as of 11-Sep-2017 09:23 documented in Structured Notes  · 	Azithromycin 5 Day Dose Pack 250 mg oral tablet: 1 dose(s) orally once a day for respiratory infection, Last Dose Taken:    · 	predniSONE 50 mg oral tablet: 1 tab(s) orally once a day , Last Dose Taken:    · 	albuterol 1.25 mg/3 mL (0.042%) inhalation solution: 3 milliliter(s) inhaled 3 times a day, Last Dose Taken:    · 	losartan 25 mg oral tablet: 1 tab(s) orally 2 times a day, Last Dose Taken:    · 	Symbicort 160 mcg-4.5 mcg/inh inhalation aerosol: 2 puff(s) inhaled 2 times a day, Last Dose Taken:    · 	Incruse Ellipta: 1 puff(s) inhaled once a day, Last Dose Taken:        Interval/Overnight Events/ ROS  Pt remained hemodynamically stable overnight, not on any pressors or inotropes. OOB to chair, breathing comfortably with minimal pain. Ambulated several times . Denies pain, no SOB, no palpitations, no nausea/ no vomiting, no dizziness  A-line and mock d/carmen         PAST MEDICAL & SURGICAL HISTORY:  History of pneumothorax: History of 3 prior pneumonthoracies.  COPD (chronic obstructive pulmonary disease)  Asthma  Hypertension  Sarcoidosis  No significant past surgical history    Allergies No Known Allergies      ***VITAL SIGNS:  Vital Signs Last 24 Hrs  T(C): 37 (02 Oct 2017 04:00), Max: 37.2 (01 Oct 2017 12:00)  T(F): 98.6 (02 Oct 2017 04:00), Max: 98.9 (01 Oct 2017 12:00)  HR: 100 (02 Oct 2017 09:17) (71 - 100)  BP: 132/73 (02 Oct 2017 09:00) (96/65 - 132/73)  BP(mean): 86 (02 Oct 2017 09:00) (70 - 109)  RR: 20 (02 Oct 2017 08:00) (14 - 25)  SpO2: 100% (02 Oct 2017 09:17) (96% - 100%)    I/Os:   I&O's Detail    01 Oct 2017 07:01  -  02 Oct 2017 07:00  --------------------------------------------------------  IN:    IV PiggyBack: 200 mL    Oral Fluid: 240 mL    sodium chloride 0.9%.: 230 mL  Total IN: 670 mL    OUT:    Chest Tube: 30 mL    Chest Tube: 230 mL    Voided: 1700 mL  Total OUT: 1960 mL    Total NET: -1290 mL      02 Oct 2017 07:01  -  02 Oct 2017 09:59  --------------------------------------------------------  IN:    Oral Fluid: 240 mL    sodium chloride 0.9%.: 30 mL  Total IN: 270 mL    OUT:    Chest Tube: 70 mL    Voided: 200 mL  Total OUT: 270 mL    Total NET: 0 mL          =======================  MEDICATIONS  ===================  MEDICATIONS  (STANDING):  atorvastatin 80 milliGRAM(s) Oral at bedtime  chlorhexidine 4% Liquid 1 Application(s) Topical daily  polyethylene glycol 3350 17 Gram(s) Oral at bedtime  piperacillin/tazobactam IVPB. 3.375 Gram(s) IV Intermittent every 8 hours  voriconazole 200 milliGRAM(s) Oral every 12 hours  sodium chloride 0.9%. 1000 milliLiter(s) (10 mL/Hr) IV Continuous <Continuous>  docusate sodium 100 milliGRAM(s) Oral two times a day  phenylephrine    Infusion 2 MICROgram(s)/kG/Min (36.375 mL/Hr) IV Continuous <Continuous>  ALBUTerol    90 MICROgram(s) HFA Inhaler 2 Puff(s) Inhalation every 6 hours  ipratropium 17 MICROgram(s) HFA Inhaler 1 Puff(s) Inhalation every 6 hours  aspirin  chewable 81 milliGRAM(s) Oral daily  diVALproex  milliGRAM(s) Oral two times a day  apixaban 5 milliGRAM(s) Oral every 12 hours  folic acid 1 milliGRAM(s) Oral daily  ferrous    sulfate 325 milliGRAM(s) Oral every 8 hours  predniSONE   Tablet 10 milliGRAM(s) Oral daily  pantoprazole    Tablet 40 milliGRAM(s) Oral two times a day before meals    MEDICATIONS  (PRN):  acetaminophen    Suspension 650 milliGRAM(s) Oral every 6 hours PRN For Temp greater than 38 C (100.4 F)  aluminum hydroxide/magnesium hydroxide/simethicone Suspension 30 milliLiter(s) Oral every 6 hours PRN Dyspepsia  oxyCODONE    IR 5 milliGRAM(s) Oral every 4 hours PRN Moderate Pain (4 - 6)  oxyCODONE    IR 10 milliGRAM(s) Oral every 4 hours PRN Severe Pain (7 - 10)  acetaminophen   Tablet. 650 milliGRAM(s) Oral every 6 hours PRN Mild Pain (1 - 3)        =================== PATIENT CARE ACCESS DEVICES ==========  Peripheral IV (+)     ======================= PHYSICAL EXAM===================  General:           Comfortable, Awake, alert, not in any distress  Neuro:             Moving all extremities to commands. LUE weakness improving	  HEENT:            GIOVANI  Respiratory:     Lungs clear on auscultation bilaterally with good aeration.                          Few rhonchi, no wheezing. Effort even and unlabored.                         Left thoracotomy  site - clean  CV:	           Regular rate and rhythm. Normal S1/S2. No murmurs  Abdomen:        Soft,  nontender, not-distended. Bowel sounds present / absent.   Skin:		No rash.  Extremities:	Warm, no cyanosis or edema.  Palpable pulses    ============================ LABS =======================                        8.2    11.85 )-----------( 147      ( 02 Oct 2017 04:45 )             25.7     10-02    142  |  101       |  18  ----------------------------<  95  4.0   |  36<H>  |  0.90    Ca    8.0<L>      02 Oct 2017 04:45  Phos  2.6     10-02  Mg     2.1     10-02        ===================== IMAGING STUDIES ===================  Radiology personally reviewed.    CXR   Oct  1 2017   INTERPRETATION:  Clinical information: Sarcoidosis with hemoptysis.   Status post thoracotomy.  Findings: Portable frontal view of the chest dated 10/1/2017 is compared  to 9/30/2017. Postsurgical changes in the left lung are stable. 2 left   chest tubes and left subclavian central line are unchanged. Bilateral   upper lobe scarring is stable. There is no pneumothorax.   Cardiomediastinal silhouette cannot be evaluated.    Impression: No pneumothorax.  Stable bilateral upper lobe scarring secondary to sarcoidosis.        MRI 9/13/17  brain showed bilateral (R>L) MCA and right PCA infarcts in the frontoparietal and occipital areas which could be cardioembolic in source.    ====================ASSESSMENT AND PLAN ================    47 M PMH of sarcoidosis, HTN, COPD, and asthma who was initially admitted on 09/10 w cc of severe leg spasms X two days  In E.D. pt found to be wheezing, so he was seen by Pulmonary and started on IV Steroids for sarcoidosis  On 9/12 at 4 am, a RRT and Stroke codes were called as the patient became unresponsive.  He was transferred to MICU and then intubated for airway protection.   The patient was found to have a new infarct on MRI and also found to have a patent PFO on this admission as well. He was weaned off vent on 09/13 and transferred to the floors   There was concern patient was having paroxysmal afib, so he was started on Eliquis yesterday evening. He has received 2 doses of Eliquis on 09/19.   However, on 09/20, the patient had a coughing episode, which was not unusual for him, but during this episode he began to cough up fresh blood, about 1/2 cup of blood. He underwent Bronchoscopy with bronchoalveolar lavage of both sides and epi injection on 09/21 and was to have received further IR embolization for hemoptysis control in the next few days.  However, he developed hemoptysis again and underwent urgent LULectomy, complicated with post op bleeding.  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  Using incentive spirometry & doing    ~1000 ml  Monitor chest tube output  Chest tube to  water seal  Posterior chest tube removed today	  Continue bronchodilators, pulmonary toilet  Head of bed elevation to 30-40 degrees  Prednisone 10 mg daily for sarcoidosis    ====================CARDIOVASCULAR=====================  Continue hemodynamic monitoring/ telemetry  Not on any pressors  Continue cardiovascular / antihypertensive medications    ===================== RENAL ============================  Continue LR 30CC/hr      D/C IVF  Monitor I/Os, BUN/ Cr  and electrolytes  D/C Mock      Keep Mock  BPH: Continue Flomax/ Finasteride      ==================== GASTROINTESTINAL===================  On regular diet, tolerating well  Continue GI prophylaxis with Pepcid / Protonix  Continue Zofran / Reglan for nausea - PRN	  NPO    =======================    ENDOCRIN  =====================  Glycemic monitoring  F/S with coverage  ===================HEMATOLOGIC/ONCOLOGIC =============  Monitor chest tube output. No signs of active bleeding.   Follow CBC, coags  in AM  DVT prophylaxis with SCD, sc Heparin    ========================INFECTIOUS DISEASE===============  No signs of infection. Monitor for fever / leukocytosis.  All surgical incision / chest tube  sites look clean  D/C Mock      Pertinent clinical, laboratory, radiographic, hemodynamic, echocardiographic, respiratory data, microbiologic data and chart were reviewed and analyzed frequently throughout the course of the day and night. GI and DVT prophylaxis, glycemic control, head of bed elevation and skin care issues were addressed.  Patient seen, examined and plan discussed with CT Surgery / CTICU team during rounds.         I have spent               minutes of critical care time with this pt between            am/pm    and               am/ pm         minutes spent on total encounter; more than 50% of the visit was spent counseling and/or coordinating care by the attending physician.        KIM Robbins MD [facility]    [id number]    47 M PMH of sarcoidosis, HTN, COPD, and asthma who was initially admitted on 09/10 w cc of severe leg spasms X two days  In E.D. pt found to be wheezing, so he was seen by Pulmonary and started on IV Steroids for sarcoidosis  On 9/12 at 4 am, a RRT and Stroke codes were called as the patient became unresponsive.  He was transferred to MICU and then intubated for airway protection.   The patient was found to have a new infarct on MRI and also found to have a patent PFO on this admission as well. He was weaned off vent on 09/13 and transferred to the floors   There was concern patient was having paroxysmal afib, so he was started on Eliquis yesterday evening. He has received 2 doses of Eliquis on 09/19.   However, on 09/20, the patient had a coughing episode, which was not unusual for him, but during this episode he began to cough up fresh blood, about 1/2 cup of blood. He underwent Bronchoscopy with bronchoalveolar lavage of both sides and epi injection on 09/21 and was to have received further IR embolization for hemoptysis control in the next few days.  However, he developed hemoptysis again and underwent urgent LULectomy, complicated with post op bleeding.  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              Sarcoidosis on IV steroids              PFO              s/p CVA with left hemiparesis              seizure dx on Valproic acid              paroxysmal A fib              Anemia - acute posthemorrhagic              s/p massive transfusion       Home Medications:   * Incomplete Medication History as of 11-Sep-2017 09:23 documented in Structured Notes  · 	Azithromycin 5 Day Dose Pack 250 mg oral tablet: 1 dose(s) orally once a day for respiratory infection, Last Dose Taken:    · 	predniSONE 50 mg oral tablet: 1 tab(s) orally once a day , Last Dose Taken:    · 	albuterol 1.25 mg/3 mL (0.042%) inhalation solution: 3 milliliter(s) inhaled 3 times a day, Last Dose Taken:    · 	losartan 25 mg oral tablet: 1 tab(s) orally 2 times a day, Last Dose Taken:    · 	Symbicort 160 mcg-4.5 mcg/inh inhalation aerosol: 2 puff(s) inhaled 2 times a day, Last Dose Taken:    · 	Incruse Ellipta: 1 puff(s) inhaled once a day, Last Dose Taken:        Interval/Overnight Events/ ROS  Pt remained hemodynamically stable overnight, not on any pressors or inotropes. OOB to chair, breathing comfortably with minimal pain. Ambulated several times . Denies pain, no SOB, no palpitations, no nausea/ no vomiting, no dizziness  A-line and mock d/carmen         PAST MEDICAL & SURGICAL HISTORY:  History of pneumothorax: History of 3 prior pneumonthoracies.  COPD (chronic obstructive pulmonary disease)  Asthma  Hypertension  Sarcoidosis  No significant past surgical history    Allergies No Known Allergies      ***VITAL SIGNS:  Vital Signs Last 24 Hrs  T(C): 37 (02 Oct 2017 04:00), Max: 37.2 (01 Oct 2017 12:00)  T(F): 98.6 (02 Oct 2017 04:00), Max: 98.9 (01 Oct 2017 12:00)  HR: 100 (02 Oct 2017 09:17) (71 - 100)  BP: 132/73 (02 Oct 2017 09:00) (96/65 - 132/73)  BP(mean): 86 (02 Oct 2017 09:00) (70 - 109)  RR: 20 (02 Oct 2017 08:00) (14 - 25)  SpO2: 100% (02 Oct 2017 09:17) (96% - 100%)    I/Os:   I&O's Detail    01 Oct 2017 07:01  -  02 Oct 2017 07:00  --------------------------------------------------------  IN:    IV PiggyBack: 200 mL    Oral Fluid: 240 mL    sodium chloride 0.9%.: 230 mL  Total IN: 670 mL    OUT:    Chest Tube: 30 mL    Chest Tube: 230 mL    Voided: 1700 mL  Total OUT: 1960 mL    Total NET: -1290 mL      02 Oct 2017 07:01  -  02 Oct 2017 09:59  --------------------------------------------------------  IN:    Oral Fluid: 240 mL    sodium chloride 0.9%.: 30 mL  Total IN: 270 mL    OUT:    Chest Tube: 70 mL    Voided: 200 mL  Total OUT: 270 mL    Total NET: 0 mL          =======================  MEDICATIONS  ===================  MEDICATIONS  (STANDING):  atorvastatin 80 milliGRAM(s) Oral at bedtime  chlorhexidine 4% Liquid 1 Application(s) Topical daily  polyethylene glycol 3350 17 Gram(s) Oral at bedtime  piperacillin/tazobactam IVPB. 3.375 Gram(s) IV Intermittent every 8 hours  voriconazole 200 milliGRAM(s) Oral every 12 hours  sodium chloride 0.9%. 1000 milliLiter(s) (10 mL/Hr) IV Continuous <Continuous>  docusate sodium 100 milliGRAM(s) Oral two times a day  ALBUTerol    90 MICROgram(s) HFA Inhaler 2 Puff(s) Inhalation every 6 hours  ipratropium 17 MICROgram(s) HFA Inhaler 1 Puff(s) Inhalation every 6 hours  aspirin  chewable 81 milliGRAM(s) Oral daily  diVALproex  milliGRAM(s) Oral two times a day  apixaban 5 milliGRAM(s) Oral every 12 hours  folic acid 1 milliGRAM(s) Oral daily  ferrous    sulfate 325 milliGRAM(s) Oral every 8 hours  predniSONE   Tablet 10 milliGRAM(s) Oral daily  pantoprazole    Tablet 40 milliGRAM(s) Oral two times a day before meals    MEDICATIONS  (PRN):  acetaminophen    Suspension 650 milliGRAM(s) Oral every 6 hours PRN For Temp greater than 38 C (100.4 F)  aluminum hydroxide/magnesium hydroxide/simethicone Suspension 30 milliLiter(s) Oral every 6 hours PRN Dyspepsia  oxyCODONE    IR 5 milliGRAM(s) Oral every 4 hours PRN Moderate Pain (4 - 6)  oxyCODONE    IR 10 milliGRAM(s) Oral every 4 hours PRN Severe Pain (7 - 10)  acetaminophen   Tablet. 650 milliGRAM(s) Oral every 6 hours PRN Mild Pain (1 - 3)        =================== PATIENT CARE ACCESS DEVICES ==========  Peripheral IV (+)     ======================= PHYSICAL EXAM===================  General:           Comfortable, Awake, alert, not in any distress  Neuro:             Moving all extremities to commands. LUE weakness improving	  HEENT:            GIOVANI  Respiratory:     Lungs clear on auscultation bilaterally with good aeration.                          Few rhonchi, no wheezing. Effort even and unlabored.                         Left thoracotomy  site - clean  CV:	           Regular rate and rhythm. Normal S1/S2. No murmurs  Abdomen:        Soft,  nontender, not-distended. Bowel sounds present / absent.   Skin:		No rash.  Extremities:	Warm, no cyanosis or edema.  Palpable pulses    ============================ LABS =======================                        8.2    11.85 )-----------( 147      ( 02 Oct 2017 04:45 )             25.7     10-02    142  |  101       |  18  ----------------------------<  95  4.0   |  36<H>  |  0.90    Ca    8.0<L>      02 Oct 2017 04:45  Phos  2.6     10-02  Mg     2.1     10-02        ===================== IMAGING STUDIES ===================  Radiology personally reviewed.    CXR   Oct  1 2017   INTERPRETATION:  Clinical information: Sarcoidosis with hemoptysis.   Status post thoracotomy.  Findings: Portable frontal view of the chest dated 10/1/2017 is compared  to 9/30/2017. Postsurgical changes in the left lung are stable. 2 left   chest tubes and left subclavian central line are unchanged. Bilateral   upper lobe scarring is stable. There is no pneumothorax.   Cardiomediastinal silhouette cannot be evaluated.    Impression: No pneumothorax.  Stable bilateral upper lobe scarring secondary to sarcoidosis.        MRI 9/13/17  brain showed bilateral (R>L) MCA and right PCA infarcts in the frontoparietal and occipital areas which could be cardioembolic in source.    ====================ASSESSMENT AND PLAN ================    47 M PMH of sarcoidosis, HTN, COPD, and asthma who was initially admitted on 09/10 w cc of severe leg spasms X two days  In E.D. pt found to be wheezing, so he was seen by Pulmonary and started on IV Steroids for sarcoidosis  On 9/12 at 4 am, a RRT and Stroke codes were called as the patient became unresponsive.  He was transferred to MICU and then intubated for airway protection.   The patient was found to have a new infarct on MRI and also found to have a patent PFO on this admission as well. He was weaned off vent on 09/13 and transferred to the floors   There was concern patient was having paroxysmal afib, so he was started on Eliquis yesterday evening. He has received 2 doses of Eliquis on 09/19.   However, on 09/20, the patient had a coughing episode, which was not unusual for him, but during this episode he began to cough up fresh blood, about 1/2 cup of blood. He underwent Bronchoscopy with bronchoalveolar lavage of both sides and epi injection on 09/21 and was to have received further IR embolization for hemoptysis control in the next few days.  However, he developed hemoptysis again and underwent urgent LULectomy, complicated with post op bleeding.  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  Using incentive spirometry & doing    ~1000 ml  Monitor chest tube output  Chest tube to  water seal  Posterior chest tube removed today	  Continue bronchodilators, pulmonary toilet  Head of bed elevation to 30-40 degrees  Prednisone 10 mg daily for sarcoidosis    ====================CARDIOVASCULAR=====================  Continue hemodynamic monitoring/ telemetry  Not on any pressors  Continue ASA, Apixaban    ===================== RENAL ============================  Continue NS 10CC/hr  KVO  Monitor I/Os, BUN/ Cr  and electrolytes    ==================== GASTROINTESTINAL===================  On regular diet, tolerating well  Continue GI prophylaxis with Protonix   Zofran / Reglan for nausea - PRN	    =======================    ENDOCRIN  =====================  Glycemic monitoring    ===================HEMATOLOGIC/ONCOLOGIC =============  Monitor chest tube output. No signs of active bleeding.   Follow CBC, coags  in AM  DVT prophylaxis with SCD, Apixaban    ========================INFECTIOUS DISEASE===============  No signs of infection. Monitor for fever / leukocytosis.  All surgical incision / chest tube  sites look clean  C/w Voriconazole , Zosyn       Pertinent clinical, laboratory, radiographic, hemodynamic, echocardiographic, respiratory data, microbiologic data and chart were reviewed and analyzed frequently throughout the course of the day and night. GI and DVT prophylaxis, glycemic control, head of bed elevation and skin care issues were addressed.  Patient seen, examined and plan discussed with CT Surgery / CTICU team during rounds.         I have spent               minutes of critical care time with this pt between            am/pm    and               am/ pm         minutes spent on total encounter; more than 50% of the visit was spent counseling and/or coordinating care by the attending physician.        KIM Robbins MD [facility]    MRN-[id number]    47 M PMH of sarcoidosis, HTN, COPD, and asthma who was initially admitted on 09/10 w cc of severe leg spasms X two days  In E.D. pt found to be wheezing, so he was seen by Pulmonary and started on IV Steroids for sarcoidosis  On 9/12 at 4 am, a RRT and Stroke codes were called as the patient became unresponsive.  He was transferred to MICU and then intubated for airway protection.   The patient was found to have a new infarct on MRI and also found to have a patent PFO on this admission as well. He was weaned off vent on 09/13 and transferred to the floors   There was concern patient was having paroxysmal afib, so he was started on Eliquis yesterday evening. He has received 2 doses of Eliquis on 09/19.   However, on 09/20, the patient had a coughing episode, which was not unusual for him, but during this episode he began to cough up fresh blood, about 1/2 cup of blood. He underwent Bronchoscopy with bronchoalveolar lavage of both sides and epi injection on 09/21 and was to have received further IR embolization for hemoptysis control in the next few days.  However, he developed hemoptysis again and underwent urgent LULectomy, complicated with post op bleeding.  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              Sarcoidosis on IV steroids              PFO              s/p CVA with left hemiparesis              seizure dx on Valproic acid              paroxysmal A fib              Anemia - acute posthemorrhagic              s/p massive transfusion       Home Medications:   * Incomplete Medication History as of 11-Sep-2017 09:23 documented in Structured Notes  · 	Azithromycin 5 Day Dose Pack 250 mg oral tablet: 1 dose(s) orally once a day for respiratory infection, Last Dose Taken:    · 	predniSONE 50 mg oral tablet: 1 tab(s) orally once a day , Last Dose Taken:    · 	albuterol 1.25 mg/3 mL (0.042%) inhalation solution: 3 milliliter(s) inhaled 3 times a day, Last Dose Taken:    · 	losartan 25 mg oral tablet: 1 tab(s) orally 2 times a day, Last Dose Taken:    · 	Symbicort 160 mcg-4.5 mcg/inh inhalation aerosol: 2 puff(s) inhaled 2 times a day, Last Dose Taken:    · 	Incruse Ellipta: 1 puff(s) inhaled once a day, Last Dose Taken:        Interval/Overnight Events/ ROS  Pt remained hemodynamically stable overnight, not on any pressors or inotropes. OOB to chair, breathing comfortably with minimal pain. Ambulated several times . Denies pain, no SOB, no palpitations, no nausea/ no vomiting, no dizziness  A-line and mock d/carmen         PAST MEDICAL & SURGICAL HISTORY:  History of pneumothorax: History of 3 prior pneumonthoracies.  COPD (chronic obstructive pulmonary disease)  Asthma  Hypertension  Sarcoidosis  No significant past surgical history    Allergies No Known Allergies      ***VITAL SIGNS:  Vital Signs Last 24 Hrs  T(C): 37 (02 Oct 2017 04:00), Max: 37.2 (01 Oct 2017 12:00)  T(F): 98.6 (02 Oct 2017 04:00), Max: 98.9 (01 Oct 2017 12:00)  HR: 100 (02 Oct 2017 09:17) (71 - 100)  BP: 132/73 (02 Oct 2017 09:00) (96/65 - 132/73)  BP(mean): 86 (02 Oct 2017 09:00) (70 - 109)  RR: 20 (02 Oct 2017 08:00) (14 - 25)  SpO2: 100% (02 Oct 2017 09:17) (96% - 100%)    I/Os:   I&O's Detail    01 Oct 2017 07:01  -  02 Oct 2017 07:00  --------------------------------------------------------  IN:    IV PiggyBack: 200 mL    Oral Fluid: 240 mL    sodium chloride 0.9%.: 230 mL  Total IN: 670 mL    OUT:    Chest Tube: 30 mL    Chest Tube: 230 mL    Voided: 1700 mL  Total OUT: 1960 mL    Total NET: -1290 mL      02 Oct 2017 07:01  -  02 Oct 2017 09:59  --------------------------------------------------------  IN:    Oral Fluid: 240 mL    sodium chloride 0.9%.: 30 mL  Total IN: 270 mL    OUT:    Chest Tube: 70 mL    Voided: 200 mL  Total OUT: 270 mL    Total NET: 0 mL          =======================  MEDICATIONS  ===================  MEDICATIONS  (STANDING):  atorvastatin 80 milliGRAM(s) Oral at bedtime  chlorhexidine 4% Liquid 1 Application(s) Topical daily  polyethylene glycol 3350 17 Gram(s) Oral at bedtime  piperacillin/tazobactam IVPB. 3.375 Gram(s) IV Intermittent every 8 hours  voriconazole 200 milliGRAM(s) Oral every 12 hours  sodium chloride 0.9%. 1000 milliLiter(s) (10 mL/Hr) IV Continuous <Continuous>  docusate sodium 100 milliGRAM(s) Oral two times a day  ALBUTerol    90 MICROgram(s) HFA Inhaler 2 Puff(s) Inhalation every 6 hours  ipratropium 17 MICROgram(s) HFA Inhaler 1 Puff(s) Inhalation every 6 hours  aspirin  chewable 81 milliGRAM(s) Oral daily  diVALproex  milliGRAM(s) Oral two times a day  apixaban 5 milliGRAM(s) Oral every 12 hours  folic acid 1 milliGRAM(s) Oral daily  ferrous    sulfate 325 milliGRAM(s) Oral every 8 hours  predniSONE   Tablet 10 milliGRAM(s) Oral daily  pantoprazole    Tablet 40 milliGRAM(s) Oral two times a day before meals    MEDICATIONS  (PRN):  acetaminophen    Suspension 650 milliGRAM(s) Oral every 6 hours PRN For Temp greater than 38 C (100.4 F)  aluminum hydroxide/magnesium hydroxide/simethicone Suspension 30 milliLiter(s) Oral every 6 hours PRN Dyspepsia  oxyCODONE    IR 5 milliGRAM(s) Oral every 4 hours PRN Moderate Pain (4 - 6)  oxyCODONE    IR 10 milliGRAM(s) Oral every 4 hours PRN Severe Pain (7 - 10)  acetaminophen   Tablet. 650 milliGRAM(s) Oral every 6 hours PRN Mild Pain (1 - 3)        =================== PATIENT CARE ACCESS DEVICES ==========  Peripheral IV (+)     ======================= PHYSICAL EXAM===================  General:           Comfortable, Awake, alert, not in any distress  Neuro:             Moving all extremities to commands. LUE weakness improving	  HEENT:            GIOVANI  Respiratory:     Lungs clear on auscultation bilaterally with good aeration.                          Few rhonchi, no wheezing. Effort even and unlabored.                         Left thoracotomy  site - clean  CV:	           Regular rate and rhythm. Normal S1/S2. No murmurs  Abdomen:        Soft,  nontender, not-distended. Bowel sounds present / absent.   Skin:		No rash.  Extremities:	Warm, no cyanosis or edema.  Palpable pulses    ============================ LABS =======================                        8.2    11.85 )-----------( 147      ( 02 Oct 2017 04:45 )             25.7     10-02    142  |  101       |  18  ----------------------------<  95  4.0   |  36<H>  |  0.90    Ca    8.0<L>      02 Oct 2017 04:45  Phos  2.6     10-02  Mg     2.1     10-02        ===================== IMAGING STUDIES ===================  Radiology personally reviewed.    CXR   Oct  1 2017   INTERPRETATION:  Clinical information: Sarcoidosis with hemoptysis.   Status post thoracotomy.  Findings: Portable frontal view of the chest dated 10/1/2017 is compared  to 9/30/2017. Postsurgical changes in the left lung are stable. 2 left   chest tubes and left subclavian central line are unchanged. Bilateral   upper lobe scarring is stable. There is no pneumothorax.   Cardiomediastinal silhouette cannot be evaluated.    Impression: No pneumothorax.  Stable bilateral upper lobe scarring secondary to sarcoidosis.        MRI 9/13/17  brain showed bilateral (R>L) MCA and right PCA infarcts in the frontoparietal and occipital areas which could be cardioembolic in source.    ====================ASSESSMENT AND PLAN ================    47 M PMH of sarcoidosis, HTN, COPD, and asthma who was initially admitted on 09/10 w cc of severe leg spasms X two days  In E.D. pt found to be wheezing, so he was seen by Pulmonary and started on IV Steroids for sarcoidosis  On 9/12 at 4 am, a RRT and Stroke codes were called as the patient became unresponsive.  He was transferred to MICU and then intubated for airway protection.   The patient was found to have a new infarct on MRI and also found to have a patent PFO on this admission as well. He was weaned off vent on 09/13 and transferred to the floors   There was concern patient was having paroxysmal afib, so he was started on Eliquis yesterday evening. He has received 2 doses of Eliquis on 09/19.   However, on 09/20, the patient had a coughing episode, which was not unusual for him, but during this episode he began to cough up fresh blood, about 1/2 cup of blood. He underwent Bronchoscopy with bronchoalveolar lavage of both sides and epi injection on 09/21 and was to have received further IR embolization for hemoptysis control in the next few days.  However, he developed hemoptysis again and underwent urgent LULectomy, complicated with post op bleeding.  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  Using incentive spirometry & doing    ~1000 ml  Monitor chest tube output  Chest tube to  water seal  Posterior chest tube removed today	  Continue bronchodilators, pulmonary toilet  Head of bed elevation to 30-40 degrees  Prednisone 10 mg daily for sarcoidosis    ====================CARDIOVASCULAR=====================  Continue hemodynamic monitoring/ telemetry  Not on any pressors  Continue ASA, Apixaban    ===================== RENAL ============================  Continue NS 10CC/hr  KVO  Monitor I/Os, BUN/ Cr  and electrolytes    ==================== GASTROINTESTINAL===================  On regular diet, tolerating well  Continue GI prophylaxis with Protonix   Zofran / Reglan for nausea - PRN	    =======================    ENDOCRIN  =====================  Glycemic monitoring    ===================HEMATOLOGIC/ONCOLOGIC =============  Monitor chest tube output. No signs of active bleeding.   Follow CBC, coags  in AM  DVT prophylaxis with SCD, Apixaban    ========================INFECTIOUS DISEASE===============  No signs of infection. Monitor for fever / leukocytosis.  All surgical incision / chest tube  sites look clean  C/w Voriconazole for aspergillosis, , Zosyn - empirically until 10/3/17 ( per ID)      Pertinent clinical, laboratory, radiographic, hemodynamic, echocardiographic, respiratory data, microbiologic data and chart were reviewed and analyzed frequently throughout the course of the day and night. GI and DVT prophylaxis, glycemic control, head of bed elevation and skin care issues were addressed.  Patient seen, examined and plan discussed with CT Surgery / CTICU team during rounds.         I have spent     45 minutes of critical care time with this pt between  7 am    and 12:30 pm         minutes spent on total encounter; more than 50% of the visit was spent counseling and/or coordinating care by the attending physician.        KIM Robbins MD [facility]    [id number]    47 M PMH of sarcoidosis, HTN, COPD, and asthma who was initially admitted on 09/10 w cc of severe leg spasms X two days  In E.D. pt found to be wheezing, so he was seen by Pulmonary and started on IV Steroids for sarcoidosis  On 9/12 at 4 am, a RRT and Stroke codes were called as the patient became unresponsive.  He was transferred to MICU and then intubated for airway protection.   The patient was found to have a new infarct on MRI and also found to have a patent PFO on this admission as well. He was weaned off vent on 09/13 and transferred to the floors   There was concern patient was having paroxysmal afib, so he was started on Eliquis yesterday evening. He has received 2 doses of Eliquis on 09/19.   However, on 09/20, the patient had a coughing episode, which was not unusual for him, but during this episode he began to cough up fresh blood, about 1/2 cup of blood. He underwent Bronchoscopy with bronchoalveolar lavage of both sides and epi injection on 09/21 and was to have received further IR embolization for hemoptysis control in the next few days.  However, he developed hemoptysis again and underwent urgent LULectomy, complicated with post op bleeding.  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              Sarcoidosis on IV steroids              PFO              s/p CVA with left hemiparesis              seizure dx on Valproic acid              paroxysmal A fib              Anemia - acute posthemorrhagic              s/p massive transfusion       Home Medications:   * Incomplete Medication History as of 11-Sep-2017 09:23 documented in Structured Notes  · 	Azithromycin 5 Day Dose Pack 250 mg oral tablet: 1 dose(s) orally once a day for respiratory infection, Last Dose Taken:    · 	predniSONE 50 mg oral tablet: 1 tab(s) orally once a day , Last Dose Taken:    · 	albuterol 1.25 mg/3 mL (0.042%) inhalation solution: 3 milliliter(s) inhaled 3 times a day, Last Dose Taken:    · 	losartan 25 mg oral tablet: 1 tab(s) orally 2 times a day, Last Dose Taken:    · 	Symbicort 160 mcg-4.5 mcg/inh inhalation aerosol: 2 puff(s) inhaled 2 times a day, Last Dose Taken:    · 	Incruse Ellipta: 1 puff(s) inhaled once a day, Last Dose Taken:        Interval/Overnight Events/ ROS  Pt remained hemodynamically stable overnight, not on any pressors or inotropes. OOB to chair, breathing comfortably with minimal pain. Ambulated several times . Denies pain, no SOB, no palpitations, no nausea/ no vomiting, no dizziness  A-line and mock d/carmen         PAST MEDICAL & SURGICAL HISTORY:  History of pneumothorax: History of 3 prior pneumonthoracies.  COPD (chronic obstructive pulmonary disease)  Asthma  Hypertension  Sarcoidosis  No significant past surgical history    Allergies No Known Allergies      ***VITAL SIGNS:  Vital Signs Last 24 Hrs  T(C): 37 (02 Oct 2017 04:00), Max: 37.2 (01 Oct 2017 12:00)  T(F): 98.6 (02 Oct 2017 04:00), Max: 98.9 (01 Oct 2017 12:00)  HR: 100 (02 Oct 2017 09:17) (71 - 100)  BP: 132/73 (02 Oct 2017 09:00) (96/65 - 132/73)  BP(mean): 86 (02 Oct 2017 09:00) (70 - 109)  RR: 20 (02 Oct 2017 08:00) (14 - 25)  SpO2: 100% (02 Oct 2017 09:17) (96% - 100%)    I/Os:   I&O's Detail    01 Oct 2017 07:01  -  02 Oct 2017 07:00  --------------------------------------------------------  IN:    IV PiggyBack: 200 mL    Oral Fluid: 240 mL    sodium chloride 0.9%.: 230 mL  Total IN: 670 mL    OUT:    Chest Tube: 30 mL    Chest Tube: 230 mL    Voided: 1700 mL  Total OUT: 1960 mL    Total NET: -1290 mL      02 Oct 2017 07:01  -  02 Oct 2017 09:59  --------------------------------------------------------  IN:    Oral Fluid: 240 mL    sodium chloride 0.9%.: 30 mL  Total IN: 270 mL    OUT:    Chest Tube: 70 mL    Voided: 200 mL  Total OUT: 270 mL    Total NET: 0 mL          =======================  MEDICATIONS  ===================  MEDICATIONS  (STANDING):  atorvastatin 80 milliGRAM(s) Oral at bedtime  chlorhexidine 4% Liquid 1 Application(s) Topical daily  polyethylene glycol 3350 17 Gram(s) Oral at bedtime  piperacillin/tazobactam IVPB. 3.375 Gram(s) IV Intermittent every 8 hours  voriconazole 200 milliGRAM(s) Oral every 12 hours  sodium chloride 0.9%. 1000 milliLiter(s) (10 mL/Hr) IV Continuous <Continuous>  docusate sodium 100 milliGRAM(s) Oral two times a day  ALBUTerol    90 MICROgram(s) HFA Inhaler 2 Puff(s) Inhalation every 6 hours  ipratropium 17 MICROgram(s) HFA Inhaler 1 Puff(s) Inhalation every 6 hours  aspirin  chewable 81 milliGRAM(s) Oral daily  diVALproex  milliGRAM(s) Oral two times a day  apixaban 5 milliGRAM(s) Oral every 12 hours  folic acid 1 milliGRAM(s) Oral daily  ferrous    sulfate 325 milliGRAM(s) Oral every 8 hours  predniSONE   Tablet 10 milliGRAM(s) Oral daily  pantoprazole    Tablet 40 milliGRAM(s) Oral two times a day before meals    MEDICATIONS  (PRN):  acetaminophen    Suspension 650 milliGRAM(s) Oral every 6 hours PRN For Temp greater than 38 C (100.4 F)  aluminum hydroxide/magnesium hydroxide/simethicone Suspension 30 milliLiter(s) Oral every 6 hours PRN Dyspepsia  oxyCODONE    IR 5 milliGRAM(s) Oral every 4 hours PRN Moderate Pain (4 - 6)  oxyCODONE    IR 10 milliGRAM(s) Oral every 4 hours PRN Severe Pain (7 - 10)  acetaminophen   Tablet. 650 milliGRAM(s) Oral every 6 hours PRN Mild Pain (1 - 3)        =================== PATIENT CARE ACCESS DEVICES ==========  Peripheral IV (+)     ======================= PHYSICAL EXAM===================  General:           Comfortable, Awake, alert, not in any distress  Neuro:             Moving all extremities to commands. LUE weakness improving	  HEENT:            GIOVANI  Respiratory:     Lungs clear on auscultation bilaterally with good aeration.                          Few rhonchi, no wheezing. Effort even and unlabored.                         Left thoracotomy  site - clean  CV:	           Regular rate and rhythm. Normal S1/S2. No murmurs  Abdomen:        Soft,  nontender, not-distended. Bowel sounds present / absent.   Skin:		No rash.  Extremities:	Warm, no cyanosis or edema.  Palpable pulses    ============================ LABS =======================                        8.2    11.85 )-----------( 147      ( 02 Oct 2017 04:45 )             25.7     10-02    142  |  101       |  18  ----------------------------<  95  4.0   |  36<H>  |  0.90    Ca    8.0<L>      02 Oct 2017 04:45  Phos  2.6     10-02  Mg     2.1     10-02        ===================== IMAGING STUDIES ===================  Radiology personally reviewed.    CXR   Oct  1 2017   INTERPRETATION:  Clinical information: Sarcoidosis with hemoptysis.   Status post thoracotomy.  Findings: Portable frontal view of the chest dated 10/1/2017 is compared  to 9/30/2017. Postsurgical changes in the left lung are stable. 2 left   chest tubes and left subclavian central line are unchanged. Bilateral   upper lobe scarring is stable. There is no pneumothorax.   Cardiomediastinal silhouette cannot be evaluated.    Impression: No pneumothorax.  Stable bilateral upper lobe scarring secondary to sarcoidosis.        MRI 9/13/17  brain showed bilateral (R>L) MCA and right PCA infarcts in the frontoparietal and occipital areas which could be cardioembolic in source.    ====================ASSESSMENT AND PLAN ================    47 M PMH of sarcoidosis, HTN, COPD, and asthma who was initially admitted on 09/10 w cc of severe leg spasms X two days  In E.D. pt found to be wheezing, so he was seen by Pulmonary and started on IV Steroids for sarcoidosis  On 9/12 at 4 am, a RRT and Stroke codes were called as the patient became unresponsive.  He was transferred to MICU and then intubated for airway protection.   The patient was found to have a new infarct on MRI and also found to have a patent PFO on this admission as well. He was weaned off vent on 09/13 and transferred to the floors   There was concern patient was having paroxysmal afib, so he was started on Eliquis yesterday evening. He has received 2 doses of Eliquis on 09/19.   However, on 09/20, the patient had a coughing episode, which was not unusual for him, but during this episode he began to cough up fresh blood, about 1/2 cup of blood. He underwent Bronchoscopy with bronchoalveolar lavage of both sides and epi injection on 09/21 and was to have received further IR embolization for hemoptysis control in the next few days.  However, he developed hemoptysis again and underwent urgent LULectomy, complicated with post op bleeding.  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  Using incentive spirometry & doing    ~1000 ml  Monitor chest tube output  Chest tube to  water seal  Posterior chest tube removed today	  Continue bronchodilators, pulmonary toilet  Head of bed elevation to 30-40 degrees  Prednisone 10 mg daily for sarcoidosis    ====================CARDIOVASCULAR=====================  Continue hemodynamic monitoring/ telemetry  Not on any pressors  Continue ASA, Apixaban    ===================== RENAL ============================  Continue NS 10CC/hr  KVO  Monitor I/Os, BUN/ Cr  and electrolytes    ==================== GASTROINTESTINAL===================  On regular diet, tolerating well  Continue GI prophylaxis with Protonix   Zofran / Reglan for nausea - PRN	    =======================    ENDOCRIN  =====================  Glycemic monitoring    ===================HEMATOLOGIC/ONCOLOGIC =============  Monitor chest tube output. No signs of active bleeding.   Follow CBC, coags  in AM  DVT prophylaxis with SCD, Apixaban    ========================INFECTIOUS DISEASE===============  No signs of infection. Monitor for fever / leukocytosis.  All surgical incision / chest tube  sites look clean  C/w Voriconazole for aspergillosis, , Zosyn - empirically until 10/3/17 ( per ID)      Pertinent clinical, laboratory, radiographic, hemodynamic, echocardiographic, respiratory data, microbiologic data and chart were reviewed and analyzed frequently throughout the course of the day and night. GI and DVT prophylaxis, glycemic control, head of bed elevation and skin care issues were addressed.  Patient seen, examined and plan discussed with CT Surgery / CTICU team during rounds.    I have spent     45 minutes of critical care time with this pt between  7 am    and 12:30 pm         minutes spent on total encounter; more than 50% of the visit was spent counseling and/or coordinating care by the attending physician.    KIM Robbins MD [facility]    MRN-[id number]    47 M PMH of sarcoidosis, HTN, COPD, and asthma who was initially admitted on 09/10 w cc of severe leg spasms X two days  In E.D. pt found to be wheezing, so he was seen by Pulmonary and started on IV Steroids for sarcoidosis  On 9/12 at 4 am, a RRT and Stroke codes were called as the patient became unresponsive.  He was transferred to MICU and then intubated for airway protection.   The patient was found to have a new infarct on MRI and also found to have a patent PFO on this admission as well. He was weaned off vent on 09/13 and transferred to the floors   There was concern patient was having paroxysmal afib, so he was started on Eliquis yesterday evening. He has received 2 doses of Eliquis on 09/19.   However, on 09/20, the patient had a coughing episode, which was not unusual for him, but during this episode he began to cough up fresh blood, about 1/2 cup of blood. He underwent Bronchoscopy with bronchoalveolar lavage of both sides and epi injection on 09/21 and was to have received further IR embolization for hemoptysis control in the next few days.  However, he developed hemoptysis again and underwent urgent LULectomy, complicated with post op bleeding.  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              Sarcoidosis on IV steroids              PFO              s/p CVA with left hemiparesis              seizure dx on Valproic acid              paroxysmal A fib              Anemia - acute posthemorrhagic              s/p massive transfusion       Home Medications:   * Incomplete Medication History as of 11-Sep-2017 09:23 documented in Structured Notes  · 	Azithromycin 5 Day Dose Pack 250 mg oral tablet: 1 dose(s) orally once a day for respiratory infection, Last Dose Taken:    · 	predniSONE 50 mg oral tablet: 1 tab(s) orally once a day , Last Dose Taken:    · 	albuterol 1.25 mg/3 mL (0.042%) inhalation solution: 3 milliliter(s) inhaled 3 times a day, Last Dose Taken:    · 	losartan 25 mg oral tablet: 1 tab(s) orally 2 times a day, Last Dose Taken:    · 	Symbicort 160 mcg-4.5 mcg/inh inhalation aerosol: 2 puff(s) inhaled 2 times a day, Last Dose Taken:    · 	Incruse Ellipta: 1 puff(s) inhaled once a day, Last Dose Taken:        Interval/Overnight Events/ ROS  Pt remained hemodynamically stable overnight, not on any pressors or inotropes. OOB to chair, breathing comfortably with minimal pain. Ambulated several times . Denies pain, no SOB, no palpitations, no nausea/ no vomiting, no dizziness  A-line and mock d/carmen         PAST MEDICAL & SURGICAL HISTORY:  History of pneumothorax: History of 3 prior pneumonthoracies.  COPD (chronic obstructive pulmonary disease)  Asthma  Hypertension  Sarcoidosis  No significant past surgical history    Allergies No Known Allergies      ***VITAL SIGNS:  Vital Signs Last 24 Hrs  T(C): 37 (02 Oct 2017 04:00), Max: 37.2 (01 Oct 2017 12:00)  T(F): 98.6 (02 Oct 2017 04:00), Max: 98.9 (01 Oct 2017 12:00)  HR: 100 (02 Oct 2017 09:17) (71 - 100)  BP: 132/73 (02 Oct 2017 09:00) (96/65 - 132/73)  BP(mean): 86 (02 Oct 2017 09:00) (70 - 109)  RR: 20 (02 Oct 2017 08:00) (14 - 25)  SpO2: 100% (02 Oct 2017 09:17) (96% - 100%)    I/Os:   I&O's Detail    01 Oct 2017 07:01  -  02 Oct 2017 07:00  --------------------------------------------------------  IN:    IV PiggyBack: 200 mL    Oral Fluid: 240 mL    sodium chloride 0.9%.: 230 mL  Total IN: 670 mL    OUT:    Chest Tube: 30 mL    Chest Tube: 230 mL    Voided: 1700 mL  Total OUT: 1960 mL    Total NET: -1290 mL      02 Oct 2017 07:01  -  02 Oct 2017 09:59  --------------------------------------------------------  IN:    Oral Fluid: 240 mL    sodium chloride 0.9%.: 30 mL  Total IN: 270 mL    OUT:    Chest Tube: 70 mL    Voided: 200 mL  Total OUT: 270 mL    Total NET: 0 mL          =======================  MEDICATIONS  ===================  MEDICATIONS  (STANDING):  atorvastatin 80 milliGRAM(s) Oral at bedtime  chlorhexidine 4% Liquid 1 Application(s) Topical daily  polyethylene glycol 3350 17 Gram(s) Oral at bedtime  piperacillin/tazobactam IVPB. 3.375 Gram(s) IV Intermittent every 8 hours  voriconazole 200 milliGRAM(s) Oral every 12 hours  sodium chloride 0.9%. 1000 milliLiter(s) (10 mL/Hr) IV Continuous <Continuous>  docusate sodium 100 milliGRAM(s) Oral two times a day  ALBUTerol    90 MICROgram(s) HFA Inhaler 2 Puff(s) Inhalation every 6 hours  ipratropium 17 MICROgram(s) HFA Inhaler 1 Puff(s) Inhalation every 6 hours  aspirin  chewable 81 milliGRAM(s) Oral daily  diVALproex  milliGRAM(s) Oral two times a day  apixaban 5 milliGRAM(s) Oral every 12 hours  folic acid 1 milliGRAM(s) Oral daily  ferrous    sulfate 325 milliGRAM(s) Oral every 8 hours  predniSONE   Tablet 10 milliGRAM(s) Oral daily  pantoprazole    Tablet 40 milliGRAM(s) Oral two times a day before meals    MEDICATIONS  (PRN):  acetaminophen    Suspension 650 milliGRAM(s) Oral every 6 hours PRN For Temp greater than 38 C (100.4 F)  aluminum hydroxide/magnesium hydroxide/simethicone Suspension 30 milliLiter(s) Oral every 6 hours PRN Dyspepsia  oxyCODONE    IR 5 milliGRAM(s) Oral every 4 hours PRN Moderate Pain (4 - 6)  oxyCODONE    IR 10 milliGRAM(s) Oral every 4 hours PRN Severe Pain (7 - 10)  acetaminophen   Tablet. 650 milliGRAM(s) Oral every 6 hours PRN Mild Pain (1 - 3)        =================== PATIENT CARE ACCESS DEVICES ==========  Peripheral IV (+)     ======================= PHYSICAL EXAM===================  General:           Comfortable, Awake, alert, not in any distress  Neuro:             Moving all extremities to commands. LUE weakness improving	  HEENT:            GIOVANI  Respiratory:     Lungs clear on auscultation bilaterally with good aeration.                          Few rhonchi, no wheezing. Effort even and unlabored.                         Left thoracotomy  site - clean  CV:	           Regular rate and rhythm. Normal S1/S2. No murmurs  Abdomen:        Soft,  nontender, not-distended. Bowel sounds present    Skin:		No rash.  Extremities:	Warm, no cyanosis or edema.  Palpable pulses    ============================ LABS =======================                        8.2    11.85 )-----------( 147      ( 02 Oct 2017 04:45 )             25.7     10-02    142  |  101       |  18  ----------------------------<  95  4.0   |  36<H>  |  0.90    Ca    8.0<L>      02 Oct 2017 04:45  Phos  2.6     10-02  Mg     2.1     10-02        ===================== IMAGING STUDIES ===================  Radiology personally reviewed.    CXR   Oct  1 2017   INTERPRETATION:  Clinical information: Sarcoidosis with hemoptysis.   Status post thoracotomy.  Findings: Portable frontal view of the chest dated 10/1/2017 is compared  to 9/30/2017. Postsurgical changes in the left lung are stable. 2 left   chest tubes and left subclavian central line are unchanged. Bilateral   upper lobe scarring is stable. There is no pneumothorax.   Cardiomediastinal silhouette cannot be evaluated.    Impression: No pneumothorax.  Stable bilateral upper lobe scarring secondary to sarcoidosis.        MRI 9/13/17  brain showed bilateral (R>L) MCA and right PCA infarcts in the frontoparietal and occipital areas which could be cardioembolic in source.    ====================ASSESSMENT AND PLAN ================    47 M PMH of sarcoidosis, HTN, COPD, and asthma who was initially admitted on 09/10 w cc of severe leg spasms X two days  In E.D. pt found to be wheezing, so he was seen by Pulmonary and started on IV Steroids for sarcoidosis  On 9/12 at 4 am, a RRT and Stroke codes were called as the patient became unresponsive.  He was transferred to MICU and then intubated for airway protection.   The patient was found to have a new infarct on MRI and also found to have a patent PFO on this admission as well. He was weaned off vent on 09/13 and transferred to the floors   There was concern patient was having paroxysmal afib, so he was started on Eliquis yesterday evening. He has received 2 doses of Eliquis on 09/19.   However, on 09/20, the patient had a coughing episode, which was not unusual for him, but during this episode he began to cough up fresh blood, about 1/2 cup of blood. He underwent Bronchoscopy with bronchoalveolar lavage of both sides and epi injection on 09/21 and was to have received further IR embolization for hemoptysis control in the next few days.  However, he developed hemoptysis again and underwent urgent LULectomy, complicated with post op bleeding.  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  Using incentive spirometry & doing    ~1000 ml  Monitor chest tube output  Chest tube to  water seal  Posterior chest tube removed today	  Continue bronchodilators, pulmonary toilet  Head of bed elevation to 30-40 degrees  Prednisone 10 mg daily for sarcoidosis    ====================CARDIOVASCULAR=====================  Continue hemodynamic monitoring/ telemetry  Not on any pressors  Continue ASA, Apixaban    ===================== RENAL ============================  Continue NS 10CC/hr  KVO  Monitor I/Os, BUN/ Cr  and electrolytes    ==================== GASTROINTESTINAL===================  On regular diet, tolerating well  Continue GI prophylaxis with Protonix   Zofran / Reglan for nausea - PRN	    =======================    ENDOCRIN  =====================  Glycemic monitoring    ===================HEMATOLOGIC/ONCOLOGIC =============  Monitor chest tube output. No signs of active bleeding.   Follow CBC, coags  in AM  DVT prophylaxis with SCD, Apixaban    ========================INFECTIOUS DISEASE===============  No signs of infection. Monitor for fever / leukocytosis.  All surgical incision / chest tube  sites look clean  C/w Voriconazole for aspergillosis, , Zosyn - empirically until 10/3/17 ( per ID)      Pertinent clinical, laboratory, radiographic, hemodynamic, echocardiographic, respiratory data, microbiologic data and chart were reviewed and analyzed frequently throughout the course of the day and night. GI and DVT prophylaxis, glycemic control, head of bed elevation and skin care issues were addressed.  Patient seen, examined and plan discussed with CT Surgery / CTICU team during rounds.    I have spent     45 minutes of critical care time with this pt between  7 am    and 12:30 pm         minutes spent on total encounter; more than 50% of the visit was spent counseling and/or coordinating care by the attending physician.    KIM Robbins MD

## 2017-10-02 NOTE — PROGRESS NOTE ADULT - PROBLEM SELECTOR PLAN 1
S/P surgery : reop twice secondary to hemothroax: curently on pressors and ventilator support: He is wheezing extensively: started on IV steroids 20 mg twice a day , but should be given only for few days : follow his wheezing daily as soon as it is resolved; dc steroids For now their is no hemoptysis: He had twice reop yesterday secondary to hemothorax and bleeding from chest tube: currently on pressors and seated and on broad spectrum antibiotics.  9/27: he seems to be doing better today: for extubation  9/28: extubated: alert and awake and responding: He is on zosyn any way with voriconazole!  9/29: chest x-ray today , to me looks better then yesterday: There is no official report yet:  10/02: doing ok

## 2017-10-02 NOTE — PROGRESS NOTE ADULT - SUBJECTIVE AND OBJECTIVE BOX
CC: F/U for mycetoma    Saw/spoke to patient. Patient in CTICU. Overall well, no new focal complaints. No new pains.    Allergies  No Known Allergies    ANTIMICROBIALS:  piperacillin/tazobactam IVPB. 3.375 every 8 hours  voriconazole 200 every 12 hours    PE:    Vital Signs Last 24 Hrs  T(C): 36.7 (02 Oct 2017 08:00), Max: 37.1 (01 Oct 2017 16:00)  T(F): 98.1 (02 Oct 2017 08:00), Max: 98.8 (01 Oct 2017 16:00)  HR: 98 (02 Oct 2017 12:00) (71 - 100)  BP: 132/87 (02 Oct 2017 12:00) (96/65 - 132/87)  BP(mean): 96 (02 Oct 2017 12:00) (70 - 109)  RR: 21 (02 Oct 2017 12:00) (14 - 25)  SpO2: 96% (02 Oct 2017 12:00) (96% - 100%)    Gen: AOx3, NAD, non-toxic, fatigued appearing, OOB  CV: S1+S2 normal, no murmurs, nontachycardic  Resp: Clear bilat, no resp distress, no crackles/wheezes; L sided chest tube  Abd: Soft, nontender, +BS  Ext: No LE edema, no wounds    LABS:                        8.2    11.85 )-----------( 147      ( 02 Oct 2017 04:45 )             25.7     10-02    142  |  101  |  18  ----------------------------<  95  4.0   |  36<H>  |  0.90    Ca    8.0<L>      02 Oct 2017 04:45  Phos  2.6     10-02  Mg     2.1     10-02    MICROBIOLOGY:    9/24 OR CX: Neg; AFB, Fungal CX neg    (Otherwise cultures reviewed and negative)    RADIOLOGY:    CT Chest 9/22:    IMPRESSION:    Marked architectural distortion with cystic changes and traction   bronchiectasis in both upper lobes secondary to end-stage sarcoidosis.  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   Prominent intercostal arteries especially on the left side especially on   the left side with no evidence of active bleed.    10/2 CXR:    Findings: Portable upright view of the chest dated 10/2/2017 is compared   to 10/1/2017. Left subclavian central line has been removed. 2 left chest   tubes are in place. There is a small left apical pneumothorax. Bilateral   upper lobe scarring is stable. No other changes are noted.    Impression: Small left apical pneumothorax.

## 2017-10-02 NOTE — PROGRESS NOTE ADULT - PROBLEM SELECTOR PLAN 6
on steroids at this time: it was given only for a short duration: thinking his wheezing is secondary to rhinovirus infection: It will cont till tomorrow: thereafter would taper it down to off in next 2-3  10/1: change prednisone to 10 mg daily  10/02: cont prednisone

## 2017-10-03 LAB
ALBUMIN SERPL ELPH-MCNC: 2.9 G/DL — LOW (ref 3.3–5)
ALP SERPL-CCNC: 39 U/L — LOW (ref 40–120)
ALT FLD-CCNC: 27 U/L — SIGNIFICANT CHANGE UP (ref 4–41)
AST SERPL-CCNC: 42 U/L — HIGH (ref 4–40)
BILIRUB SERPL-MCNC: 0.3 MG/DL — SIGNIFICANT CHANGE UP (ref 0.2–1.2)
BUN SERPL-MCNC: 15 MG/DL — SIGNIFICANT CHANGE UP (ref 7–23)
CALCIUM SERPL-MCNC: 8.1 MG/DL — LOW (ref 8.4–10.5)
CHLORIDE SERPL-SCNC: 101 MMOL/L — SIGNIFICANT CHANGE UP (ref 98–107)
CO2 SERPL-SCNC: 35 MMOL/L — HIGH (ref 22–31)
CREAT SERPL-MCNC: 0.9 MG/DL — SIGNIFICANT CHANGE UP (ref 0.5–1.3)
GLUCOSE SERPL-MCNC: 93 MG/DL — SIGNIFICANT CHANGE UP (ref 70–99)
HCT VFR BLD CALC: 26.3 % — LOW (ref 39–50)
HGB BLD-MCNC: 8.4 G/DL — LOW (ref 13–17)
MAGNESIUM SERPL-MCNC: 2 MG/DL — SIGNIFICANT CHANGE UP (ref 1.6–2.6)
MCHC RBC-ENTMCNC: 30.2 PG — SIGNIFICANT CHANGE UP (ref 27–34)
MCHC RBC-ENTMCNC: 31.9 % — LOW (ref 32–36)
MCV RBC AUTO: 94.6 FL — SIGNIFICANT CHANGE UP (ref 80–100)
NRBC # FLD: 0 — SIGNIFICANT CHANGE UP
PHOSPHATE SERPL-MCNC: 2.8 MG/DL — SIGNIFICANT CHANGE UP (ref 2.5–4.5)
PLATELET # BLD AUTO: 175 K/UL — SIGNIFICANT CHANGE UP (ref 150–400)
PMV BLD: 12.4 FL — SIGNIFICANT CHANGE UP (ref 7–13)
POTASSIUM SERPL-MCNC: 4 MMOL/L — SIGNIFICANT CHANGE UP (ref 3.5–5.3)
POTASSIUM SERPL-SCNC: 4 MMOL/L — SIGNIFICANT CHANGE UP (ref 3.5–5.3)
PROT SERPL-MCNC: 5.4 G/DL — LOW (ref 6–8.3)
RBC # BLD: 2.78 M/UL — LOW (ref 4.2–5.8)
RBC # FLD: 15.2 % — HIGH (ref 10.3–14.5)
SODIUM SERPL-SCNC: 144 MMOL/L — SIGNIFICANT CHANGE UP (ref 135–145)
WBC # BLD: 11.49 K/UL — HIGH (ref 3.8–10.5)
WBC # FLD AUTO: 11.49 K/UL — HIGH (ref 3.8–10.5)

## 2017-10-03 PROCEDURE — 99232 SBSQ HOSP IP/OBS MODERATE 35: CPT

## 2017-10-03 PROCEDURE — 71010: CPT | Mod: 26,77

## 2017-10-03 PROCEDURE — 71010: CPT | Mod: 26

## 2017-10-03 PROCEDURE — 99233 SBSQ HOSP IP/OBS HIGH 50: CPT

## 2017-10-03 RX ORDER — ACETAMINOPHEN 500 MG
1000 TABLET ORAL ONCE
Qty: 0 | Refills: 0 | Status: COMPLETED | OUTPATIENT
Start: 2017-10-03 | End: 2017-10-03

## 2017-10-03 RX ADMIN — DIVALPROEX SODIUM 500 MILLIGRAM(S): 500 TABLET, DELAYED RELEASE ORAL at 06:53

## 2017-10-03 RX ADMIN — Medication 1000 MILLIGRAM(S): at 13:45

## 2017-10-03 RX ADMIN — Medication 400 MILLIGRAM(S): at 13:30

## 2017-10-03 RX ADMIN — Medication 1 PUFF(S): at 22:01

## 2017-10-03 RX ADMIN — ALBUTEROL 2 PUFF(S): 90 AEROSOL, METERED ORAL at 09:47

## 2017-10-03 RX ADMIN — Medication 325 MILLIGRAM(S): at 13:52

## 2017-10-03 RX ADMIN — ATORVASTATIN CALCIUM 80 MILLIGRAM(S): 80 TABLET, FILM COATED ORAL at 21:04

## 2017-10-03 RX ADMIN — OXYCODONE HYDROCHLORIDE 5 MILLIGRAM(S): 5 TABLET ORAL at 06:30

## 2017-10-03 RX ADMIN — VORICONAZOLE 200 MILLIGRAM(S): 10 INJECTION, POWDER, LYOPHILIZED, FOR SOLUTION INTRAVENOUS at 06:53

## 2017-10-03 RX ADMIN — APIXABAN 5 MILLIGRAM(S): 2.5 TABLET, FILM COATED ORAL at 19:16

## 2017-10-03 RX ADMIN — Medication 1 PUFF(S): at 16:05

## 2017-10-03 RX ADMIN — PIPERACILLIN AND TAZOBACTAM 25 GRAM(S): 4; .5 INJECTION, POWDER, LYOPHILIZED, FOR SOLUTION INTRAVENOUS at 06:53

## 2017-10-03 RX ADMIN — ALBUTEROL 2 PUFF(S): 90 AEROSOL, METERED ORAL at 16:05

## 2017-10-03 RX ADMIN — SODIUM CHLORIDE 10 MILLILITER(S): 9 INJECTION INTRAMUSCULAR; INTRAVENOUS; SUBCUTANEOUS at 20:17

## 2017-10-03 RX ADMIN — PANTOPRAZOLE SODIUM 40 MILLIGRAM(S): 20 TABLET, DELAYED RELEASE ORAL at 06:53

## 2017-10-03 RX ADMIN — PANTOPRAZOLE SODIUM 40 MILLIGRAM(S): 20 TABLET, DELAYED RELEASE ORAL at 15:53

## 2017-10-03 RX ADMIN — OXYCODONE HYDROCHLORIDE 5 MILLIGRAM(S): 5 TABLET ORAL at 06:00

## 2017-10-03 RX ADMIN — Medication 81 MILLIGRAM(S): at 13:52

## 2017-10-03 RX ADMIN — Medication 325 MILLIGRAM(S): at 06:00

## 2017-10-03 RX ADMIN — ALBUTEROL 2 PUFF(S): 90 AEROSOL, METERED ORAL at 03:51

## 2017-10-03 RX ADMIN — VORICONAZOLE 200 MILLIGRAM(S): 10 INJECTION, POWDER, LYOPHILIZED, FOR SOLUTION INTRAVENOUS at 19:24

## 2017-10-03 RX ADMIN — Medication 325 MILLIGRAM(S): at 21:04

## 2017-10-03 RX ADMIN — Medication 1 PUFF(S): at 09:47

## 2017-10-03 RX ADMIN — Medication 10 MILLIGRAM(S): at 06:01

## 2017-10-03 RX ADMIN — Medication 1 MILLIGRAM(S): at 13:52

## 2017-10-03 RX ADMIN — APIXABAN 5 MILLIGRAM(S): 2.5 TABLET, FILM COATED ORAL at 06:53

## 2017-10-03 RX ADMIN — ALBUTEROL 2 PUFF(S): 90 AEROSOL, METERED ORAL at 21:58

## 2017-10-03 RX ADMIN — Medication 1 PUFF(S): at 03:51

## 2017-10-03 RX ADMIN — DIVALPROEX SODIUM 500 MILLIGRAM(S): 500 TABLET, DELAYED RELEASE ORAL at 19:17

## 2017-10-03 NOTE — PROGRESS NOTE ADULT - SUBJECTIVE AND OBJECTIVE BOX
CC: F/U for mycetoma    Saw/spoke to patient. Patient overall well. Remains in CTICU. No fevers, no chills, no new events.    Allergies  No Known Allergies    ANTIMICROBIALS:  piperacillin/tazobactam IVPB. 3.375 every 8 hours  voriconazole 200 every 12 hours    PE:    Vital Signs Last 24 Hrs  T(C): 36.3 (03 Oct 2017 08:00), Max: 37.2 (02 Oct 2017 20:00)  T(F): 97.4 (03 Oct 2017 08:00), Max: 98.9 (02 Oct 2017 20:00)  HR: 88 (03 Oct 2017 09:54) (75 - 98)  BP: 112/71 (03 Oct 2017 09:00) (91/61 - 132/87)  BP(mean): 80 (03 Oct 2017 09:00) (68 - 96)  RR: 17 (03 Oct 2017 09:54) (12 - 25)  SpO2: 98% (03 Oct 2017 09:54) (96% - 100%)    Gen: AOx3, NAD, non-toxic, fatigued apeparing  CV: S1+S2 normal, no murmurs, nontachycardic  Resp: Clear bilat, no resp distress, no crackles/wheezes; chest tube  Abd: Soft, nontender, +BS  Ext: No LE edema, no wounds    LABS:                        8.4    11.49 )-----------( 175      ( 03 Oct 2017 03:40 )             26.3     10-03    144  |  101  |  15  ----------------------------<  93  4.0   |  35<H>  |  0.90    Ca    8.1<L>      03 Oct 2017 03:40  Phos  2.8     10-03  Mg     2.0     10-03    TPro  5.4<L>  /  Alb  2.9<L>  /  TBili  0.3  /  DBili  x   /  AST  42<H>  /  ALT  27  /  AlkPhos  39<L>  10-03    MICROBIOLOGY:    OTHER--OR CX  09-24-17 NGTD; AFB, Fungal NGTD     BRONCHIAL LAVAGE  09-21-17 NGTD (AFB, fungal, standard)    RADIOLOGY:    10/3 CXR:    IMPRESSION:  Left chest tube unchanged in position.    Small left apical pneumothorax, not significant change. Minimal increase   in small left basilar pneumothorax.    Increased subcutaneous emphysema.

## 2017-10-03 NOTE — PROGRESS NOTE ADULT - ASSESSMENT
47 year old male with a PMHx of sarcoidosis, HTN, COPD, and asthma who initially presented to the ED with severe leg spasms. Prolonged hospital stay. Was found to have mycetoma on CT chest, and had resection on 9/24, back to OR 9/26 for bleeding. Today, overall appears stable, cultures from OR remain negative. Still on empiric vori and zosyn. Overall stable. Path from OR suggestive of aspergillosis. Bacterial culture neg.  - Continue Vori 200mg q 12, likely for 6 week course  - Would discontinue zosyn  - F/U OR cultures    Stiven Olmstead MD  Pager [phone]  After 5pm and on weekends call [phone]

## 2017-10-03 NOTE — PROGRESS NOTE ADULT - PROBLEM SELECTOR PLAN 6
on steroids at this time: it was given only for a short duration: thinking his wheezing is secondary to rhinovirus infection: It will cont till tomorrow: thereafter would taper it down to off in next 2-3  10/1: change prednisone to 10 mg daily  10/02: cont prednisone  10/03: trop prednisone after todays dose

## 2017-10-03 NOTE — PROGRESS NOTE ADULT - SUBJECTIVE AND OBJECTIVE BOX
OSCAR CHACKO            MRN-[id number]         No Known Allergies             47 M PMH of sarcoidosis, HTN, COPD, and asthma who was initially admitted on 09/10 w cc of severe leg spasms X two days  In E.D. pt found to be wheezing, so he was seen by Pulmonary and started on IV Steroids for sarcoidosis  On 9/12 at 4 am, a RRT and Stroke codes were called as the patient became unresponsive.  He was transferred to MICU and then intubated for airway protection.   The patient was found to have a new infarct on MRI and also found to have a patent PFO on this admission as well. He was weaned off vent on 09/13 and transferred to the floors   There was concern patient was having paroxysmal afib, so he was started on Eliquis yesterday evening. He has received 2 doses of Eliquis on 09/19.   However, on 09/20, the patient had a coughing episode, which was not unusual for him, but during this episode he began to cough up fresh blood, about 1/2 cup of blood. He underwent Bronchoscopy with bronchoalveolar lavage of both sides and epi injection on 09/21 and was to have received further IR embolization for hemoptysis control in the next few days.  However, he developed hemoptysis again and underwent urgent LULectomy, complicated with post op bleeding.  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  COPD (chronic obstructive pulmonary disease)  Asthma  Hypertension  Sarcoidosis  No significant past surgical history        ICU Vital Signs Last 24 Hrs  T(C): 36.3 (03 Oct 2017 08:00), Max: 37.2 (02 Oct 2017 20:00)  T(F): 97.4 (03 Oct 2017 08:00), Max: 98.9 (02 Oct 2017 20:00)  HR: 90 (03 Oct 2017 11:00) (75 - 98)  BP: 101/66 (03 Oct 2017 11:00) (91/61 - 129/70)  BP(mean): 74 (03 Oct 2017 11:00) (68 - 89)  ABP: --  ABP(mean): --  RR: 15 (03 Oct 2017 11:00) (12 - 25)  SpO2: 100% (03 Oct 2017 11:00) (98% - 100%)    I&O's Summary    02 Oct 2017 07:01  -  03 Oct 2017 07:00  --------------------------------------------------------  IN: 1055 mL / OUT: 1055 mL / NET: 0 mL    03 Oct 2017 07:01  -  03 Oct 2017 12:08  --------------------------------------------------------  IN: 315 mL / OUT: 365 mL / NET: -50 mL      CAPILLARY BLOOD GLUCOSE      MEDICATIONS  (STANDING):  ALBUTerol    90 MICROgram(s) HFA Inhaler 2 Puff(s) Inhalation every 6 hours  apixaban 5 milliGRAM(s) Oral every 12 hours  aspirin  chewable 81 milliGRAM(s) Oral daily  atorvastatin 80 milliGRAM(s) Oral at bedtime  chlorhexidine 4% Liquid 1 Application(s) Topical daily  diVALproex  milliGRAM(s) Oral two times a day  docusate sodium 100 milliGRAM(s) Oral two times a day  ferrous    sulfate 325 milliGRAM(s) Oral every 8 hours  folic acid 1 milliGRAM(s) Oral daily  ipratropium 17 MICROgram(s) HFA Inhaler 1 Puff(s) Inhalation every 6 hours  pantoprazole    Tablet 40 milliGRAM(s) Oral two times a day before meals  phenylephrine    Infusion 2 MICROgram(s)/kG/Min (36.375 mL/Hr) IV Continuous <Continuous>  piperacillin/tazobactam IVPB. 3.375 Gram(s) IV Intermittent every 8 hours  polyethylene glycol 3350 17 Gram(s) Oral at bedtime  predniSONE   Tablet 10 milliGRAM(s) Oral daily  sodium chloride 0.9%. 1000 milliLiter(s) (10 mL/Hr) IV Continuous <Continuous>  voriconazole 200 milliGRAM(s) Oral every 12 hours    MEDICATIONS  (PRN):  acetaminophen    Suspension 650 milliGRAM(s) Oral every 6 hours PRN For Temp greater than 38 C (100.4 F)  acetaminophen   Tablet. 650 milliGRAM(s) Oral every 6 hours PRN Mild Pain (1 - 3)  aluminum hydroxide/magnesium hydroxide/simethicone Suspension 30 milliLiter(s) Oral every 6 hours PRN Dyspepsia      Physical exam:      General:              Pt is awake, alert,                                                Neuro:                 focal,  weak   L>R  KAMLESH>LL                          Cardiovascular:    S1 & S2, regular                           Respiratory:         Air entry is fair and has bilateral conducted sounds, ronchii                           GI:                          Soft, nondistended and nontender, Bowel sounds active                            Ext:                        No cyanosis &  mild  bilateral  edema of upper ext    Labs:                                                                           8.4    11.49 )-----------( 175      ( 03 Oct 2017 03:40 )             26.3             10-03    144  |  101  |  15  ----------------------------<  93  4.0   |  35<H>  |  0.90    Ca    8.1<L>      03 Oct 2017 03:40  Phos  2.8     10-03  Mg     2.0     10-03    TPro  5.4<L>  /  Alb  2.9<L>  /  TBili  0.3  /  DBili  x   /  AST  42<H>  /  ALT  27  /  AlkPhos  39<L>  10-03                      CXR:    The heart is not enlarged.  Bilateral lung opacities and emphysematous changes are again noted.  A trace right pleural effusion is unchanged.  Left chest tube is unchanged in position.  Small left apical pneumothorax is not significantly changed. Minimal   increase in small left basilar pneumothorax.  There is increased left subcutaneous emphysema.      Plan:    47 M  s/p Left VATS and DAVID lobectomy on 9/24/17, RTOR for bleeding twice. Extubated, off pressors, OOB & ambulating with PT.                               Neuro:                                         Pain control with Tylenol IV / PO    CVA - Continue PT. Physical medicine evaluation                             Cardiovascular:                                             Continue hemodynamic monitoring    PFO - On Eliquis 5mg/bid                            Respiratory:                                                                                                                                                                                                                                                                                                                                                                                                                                                                                                                                                                                                                                                                                                                                                                                                                                                                                                      Comfortable, not in any distress.                                         Monitor chest tube output                                         Chest tube to water seal                                                                Sarcoidosis on Prednisone. Continue bronchodilators & pulmonary toilet                          GI                                         Tolerating PO                                         Continue GI prophylaxis with Protonix                                         Continue Zofran / Reglan for nausea - PRN	                                                                 Renal:                                                                                  Monitor I/Os and electrolytes                                                 Hem/ Onc:                                                                                  Monitor chest tube output &  outputs     Anemia: Continue Iron / FA                                         Follow CBC                            Infectious disease:    Aspergilloma - On voriconazole 200 milliGRAM(s) Oral every 12 hours      Monitor for fever / leukocytosis.                                         All surgical incision / chest tube  sites look clean                            Endocrine                                          Continue Accu-Checks with coverage    All available pertinent clinical, laboratory, radiographic, hemodynamic, echocardiographic, respiratory data, microbiologic data and chart were reviewed and analyzed   Patient seen, examined and plan discussed with CT Surgeon Dr. Caldera / CTICU team during rounds.            Len Kim MD

## 2017-10-03 NOTE — PROGRESS NOTE ADULT - SUBJECTIVE AND OBJECTIVE BOX
Patient is a 47y old  Male who presents with a chief complaint of "My leg wouldn't stop shaking." (14 Sep 2017 09:59)  doingok  no SOB sitting on chair with no SOB and iso ff oxygen    Any change in ROS:     MEDICATIONS  (STANDING):  ALBUTerol    90 MICROgram(s) HFA Inhaler 2 Puff(s) Inhalation every 6 hours  apixaban 5 milliGRAM(s) Oral every 12 hours  aspirin  chewable 81 milliGRAM(s) Oral daily  atorvastatin 80 milliGRAM(s) Oral at bedtime  chlorhexidine 4% Liquid 1 Application(s) Topical daily  diVALproex  milliGRAM(s) Oral two times a day  docusate sodium 100 milliGRAM(s) Oral two times a day  ferrous    sulfate 325 milliGRAM(s) Oral every 8 hours  folic acid 1 milliGRAM(s) Oral daily  ipratropium 17 MICROgram(s) HFA Inhaler 1 Puff(s) Inhalation every 6 hours  pantoprazole    Tablet 40 milliGRAM(s) Oral two times a day before meals  polyethylene glycol 3350 17 Gram(s) Oral at bedtime  predniSONE   Tablet 10 milliGRAM(s) Oral daily  sodium chloride 0.9%. 1000 milliLiter(s) (10 mL/Hr) IV Continuous <Continuous>  voriconazole 200 milliGRAM(s) Oral every 12 hours    MEDICATIONS  (PRN):  acetaminophen    Suspension 650 milliGRAM(s) Oral every 6 hours PRN For Temp greater than 38 C (100.4 F)  acetaminophen   Tablet. 650 milliGRAM(s) Oral every 6 hours PRN Mild Pain (1 - 3)  aluminum hydroxide/magnesium hydroxide/simethicone Suspension 30 milliLiter(s) Oral every 6 hours PRN Dyspepsia    Vital Signs Last 24 Hrs  T(C): 36.3 (03 Oct 2017 08:00), Max: 37.2 (02 Oct 2017 20:00)  T(F): 97.4 (03 Oct 2017 08:00), Max: 98.9 (02 Oct 2017 20:00)  HR: 90 (03 Oct 2017 11:00) (75 - 98)  BP: 101/66 (03 Oct 2017 11:00) (91/61 - 129/70)  BP(mean): 74 (03 Oct 2017 11:00) (68 - 88)  RR: 15 (03 Oct 2017 11:00) (12 - 25)  SpO2: 100% (03 Oct 2017 11:00) (98% - 100%)    I&O's Summary    02 Oct 2017 07:01  -  03 Oct 2017 07:00  --------------------------------------------------------  IN: 1055 mL / OUT: 1055 mL / NET: 0 mL    03 Oct 2017 07:01  -  03 Oct 2017 13:05  --------------------------------------------------------  IN: 315 mL / OUT: 365 mL / NET: -50 mL          Physical Exam:   GENERAL: NAD, well-groomed, well-developed  HEENT: GIOVANI/   Atraumatic, Normocephalic  ENMT: No tonsillar erythema, exudates, or enlargement; Moist mucous membranes, Good dentition, No lesions  NECK: Supple, No JVD, Normal thyroid  CHEST/LUNG: Decreased air entry bilaterally   CVS: Regular rate and rhythm; No murmurs, rubs, or gallops  GI: : Soft, Nontender, Nondistended; Bowel sounds present  NERVOUS SYSTEM:  Alert & Oriented X3  EXTREMITIES: less edema  LYMPH: No lymphadenopathy noted  SKIN: No rashes or lesions  ENDOCRINOLOGY: No Thyromegaly  PSYCH: Appropriate    Labs:                      8.4    11.49 )-----------( 175      ( 03 Oct 2017 03:40 )             26.3                         8.2    11.85 )-----------( 147      ( 02 Oct 2017 04:45 )             25.7                         7.7    10.21 )-----------( 121      ( 01 Oct 2017 04:00 )             23.6                         7.6    11.39 )-----------( 112      ( 30 Sep 2017 04:10 )             22.5     10-03    144  |  101  |  15  ----------------------------<  93  4.0   |  35<H>  |  0.90  10-02    142  |  101  |  18  ----------------------------<  95  4.0   |  36<H>  |  0.90  10-01    142  |  99  |  20  ----------------------------<  111<H>  3.6   |  36<H>  |  0.96  09-30    144  |  101  |  22  ----------------------------<  106<H>  3.9   |  37<H>  |  0.98    Ca    8.1<L>      03 Oct 2017 03:40  Ca    8.0<L>      02 Oct 2017 04:45  Phos  2.8     10-03  Phos  2.6     10-02  Mg     2.0     10-03  Mg     2.1     10-02    TPro  5.4<L>  /  Alb  2.9<L>  /  TBili  0.3  /  DBili  x   /  AST  42<H>  /  ALT  27  /  AlkPhos  39<L>  10-03    CAPILLARY BLOOD GLUCOSE          LIVER FUNCTIONS - ( 03 Oct 2017 03:40 )  Alb: 2.9 g/dL / Pro: 5.4 g/dL / ALK PHOS: 39 u/L / ALT: 27 u/L / AST: 42 u/L / GGT: x               Cultures:         < from: Xray Chest 1 View AP- PORTABLE-Urgent (10.03.17 @ 09:00) >  Left chest tube is unchanged in position.  Small left apical pneumothorax is not significantly changed. Minimal   increase in small left basilar pneumothorax.  There is increased left subcutaneous emphysema.              IMPRESSION:  Left chest tube unchanged in position.    Small left apical pneumothorax, not significant change. Minimal increase   in small left basilar pneumothorax.    Increased subcutaneous emphysema.                  KEERTHI FIELDS M.D., ATTENDING RADIOLOGIST  This document has been electronically signed. Oct  3 2017  9:09AM    < end of copied text >      < from: Xray Chest 1 View AP -PORTABLE-Routine (10.02.17 @ 06:43) >    EXAM:  RAD CHEST PORTABLE ROUTINE        PROCEDURE DATE:  Oct  2 2017         INTERPRETATION:  Clinical information: Status post thoracotomy.    Findings: Portable upright view of the chest dated 10/2/2017 is compared   to 10/1/2017. Left subclavian central line has been removed. 2 left chest   tubes are in place. There is a small left apical pneumothorax. Bilateral   upper lobe scarring is stable. No other changes are noted.    Impression: Small left apical pneumothorax.                  JAMES GUERRERO M.D., ATTENDING RADIOLOGIST  This document has been electronically signed. Oct  2 2017 12:42PM    < end of copied text >                  Studies  Chest X-RAY  CT SCAN Chest   Venous Dopplers: LE:   CT Abdomen  Others

## 2017-10-03 NOTE — PROGRESS NOTE ADULT - PROBLEM SELECTOR PLAN 3
wheezing is present :pk pressures in 30's : started him on steroids again  bronchodilators ATC:  9/27; if he continues to show improvement: can decrease his solumedrol tomorrow  9/28: decreases his IV steroids to 20 mg once a day for a few days more  9/29: on 20 mg per day of solumedrol: would continue for few more days and then decrease it  9/30: doing ok: - wheezing: decrease his steroids to 10 mg a day from tomorrow  10/1: change to po prednisone 10 mg  10/02; cont po prednisone  10/3: stop prednisone after todays dose

## 2017-10-04 LAB
APTT BLD: 25.9 SEC — LOW (ref 27.5–37.4)
BUN SERPL-MCNC: 15 MG/DL — SIGNIFICANT CHANGE UP (ref 7–23)
CALCIUM SERPL-MCNC: 8.2 MG/DL — LOW (ref 8.4–10.5)
CHLORIDE SERPL-SCNC: 98 MMOL/L — SIGNIFICANT CHANGE UP (ref 98–107)
CO2 SERPL-SCNC: 34 MMOL/L — HIGH (ref 22–31)
CREAT SERPL-MCNC: 0.88 MG/DL — SIGNIFICANT CHANGE UP (ref 0.5–1.3)
GLUCOSE SERPL-MCNC: 107 MG/DL — HIGH (ref 70–99)
HCT VFR BLD CALC: 27.5 % — LOW (ref 39–50)
HGB BLD-MCNC: 8.7 G/DL — LOW (ref 13–17)
INR BLD: 1.27 — HIGH (ref 0.88–1.17)
MAGNESIUM SERPL-MCNC: 2.1 MG/DL — SIGNIFICANT CHANGE UP (ref 1.6–2.6)
MCHC RBC-ENTMCNC: 30.5 PG — SIGNIFICANT CHANGE UP (ref 27–34)
MCHC RBC-ENTMCNC: 31.6 % — LOW (ref 32–36)
MCV RBC AUTO: 96.5 FL — SIGNIFICANT CHANGE UP (ref 80–100)
NRBC # FLD: 0 — SIGNIFICANT CHANGE UP
PHOSPHATE SERPL-MCNC: 2.4 MG/DL — LOW (ref 2.5–4.5)
PLATELET # BLD AUTO: 194 K/UL — SIGNIFICANT CHANGE UP (ref 150–400)
PMV BLD: 13 FL — SIGNIFICANT CHANGE UP (ref 7–13)
POTASSIUM SERPL-MCNC: 3.9 MMOL/L — SIGNIFICANT CHANGE UP (ref 3.5–5.3)
POTASSIUM SERPL-SCNC: 3.9 MMOL/L — SIGNIFICANT CHANGE UP (ref 3.5–5.3)
PROTHROM AB SERPL-ACNC: 14.3 SEC — HIGH (ref 9.8–13.1)
RBC # BLD: 2.85 M/UL — LOW (ref 4.2–5.8)
RBC # FLD: 15.8 % — HIGH (ref 10.3–14.5)
SODIUM SERPL-SCNC: 143 MMOL/L — SIGNIFICANT CHANGE UP (ref 135–145)
WBC # BLD: 11.31 K/UL — HIGH (ref 3.8–10.5)
WBC # FLD AUTO: 11.31 K/UL — HIGH (ref 3.8–10.5)

## 2017-10-04 PROCEDURE — 71010: CPT | Mod: 26,77

## 2017-10-04 PROCEDURE — 99233 SBSQ HOSP IP/OBS HIGH 50: CPT

## 2017-10-04 PROCEDURE — 71010: CPT | Mod: 26

## 2017-10-04 PROCEDURE — 99232 SBSQ HOSP IP/OBS MODERATE 35: CPT

## 2017-10-04 RX ORDER — SODIUM,POTASSIUM PHOSPHATES 278-250MG
1 POWDER IN PACKET (EA) ORAL
Qty: 0 | Refills: 0 | Status: COMPLETED | OUTPATIENT
Start: 2017-10-04 | End: 2017-10-05

## 2017-10-04 RX ORDER — ACETAMINOPHEN 500 MG
650 TABLET ORAL EVERY 6 HOURS
Qty: 0 | Refills: 0 | Status: DISCONTINUED | OUTPATIENT
Start: 2017-10-04 | End: 2017-11-02

## 2017-10-04 RX ORDER — ALPRAZOLAM 0.25 MG
0.5 TABLET ORAL ONCE
Qty: 0 | Refills: 0 | Status: DISCONTINUED | OUTPATIENT
Start: 2017-10-04 | End: 2017-10-04

## 2017-10-04 RX ORDER — TRAMADOL HYDROCHLORIDE 50 MG/1
25 TABLET ORAL EVERY 4 HOURS
Qty: 0 | Refills: 0 | Status: DISCONTINUED | OUTPATIENT
Start: 2017-10-04 | End: 2017-10-09

## 2017-10-04 RX ORDER — SODIUM,POTASSIUM PHOSPHATES 278-250MG
1 POWDER IN PACKET (EA) ORAL
Qty: 0 | Refills: 0 | Status: DISCONTINUED | OUTPATIENT
Start: 2017-10-04 | End: 2017-10-04

## 2017-10-04 RX ADMIN — Medication 81 MILLIGRAM(S): at 16:04

## 2017-10-04 RX ADMIN — Medication 10 MILLIGRAM(S): at 06:44

## 2017-10-04 RX ADMIN — Medication 1 MILLIGRAM(S): at 16:05

## 2017-10-04 RX ADMIN — VORICONAZOLE 200 MILLIGRAM(S): 10 INJECTION, POWDER, LYOPHILIZED, FOR SOLUTION INTRAVENOUS at 17:37

## 2017-10-04 RX ADMIN — PANTOPRAZOLE SODIUM 40 MILLIGRAM(S): 20 TABLET, DELAYED RELEASE ORAL at 16:05

## 2017-10-04 RX ADMIN — ALBUTEROL 2 PUFF(S): 90 AEROSOL, METERED ORAL at 22:31

## 2017-10-04 RX ADMIN — Medication 325 MILLIGRAM(S): at 16:05

## 2017-10-04 RX ADMIN — Medication 650 MILLIGRAM(S): at 10:00

## 2017-10-04 RX ADMIN — ALBUTEROL 2 PUFF(S): 90 AEROSOL, METERED ORAL at 03:47

## 2017-10-04 RX ADMIN — DIVALPROEX SODIUM 500 MILLIGRAM(S): 500 TABLET, DELAYED RELEASE ORAL at 17:38

## 2017-10-04 RX ADMIN — Medication 1 PUFF(S): at 03:47

## 2017-10-04 RX ADMIN — Medication 1 TABLET(S): at 12:00

## 2017-10-04 RX ADMIN — APIXABAN 5 MILLIGRAM(S): 2.5 TABLET, FILM COATED ORAL at 17:37

## 2017-10-04 RX ADMIN — CHLORHEXIDINE GLUCONATE 1 APPLICATION(S): 213 SOLUTION TOPICAL at 05:37

## 2017-10-04 RX ADMIN — ATORVASTATIN CALCIUM 80 MILLIGRAM(S): 80 TABLET, FILM COATED ORAL at 21:50

## 2017-10-04 RX ADMIN — Medication 1 TABLET(S): at 08:54

## 2017-10-04 RX ADMIN — DIVALPROEX SODIUM 500 MILLIGRAM(S): 500 TABLET, DELAYED RELEASE ORAL at 06:44

## 2017-10-04 RX ADMIN — Medication 1 PUFF(S): at 22:31

## 2017-10-04 RX ADMIN — Medication 1 TABLET(S): at 17:38

## 2017-10-04 RX ADMIN — Medication 1 PUFF(S): at 09:39

## 2017-10-04 RX ADMIN — ALBUTEROL 2 PUFF(S): 90 AEROSOL, METERED ORAL at 15:44

## 2017-10-04 RX ADMIN — APIXABAN 5 MILLIGRAM(S): 2.5 TABLET, FILM COATED ORAL at 06:44

## 2017-10-04 RX ADMIN — Medication 1 PUFF(S): at 15:44

## 2017-10-04 RX ADMIN — Medication 0.5 MILLIGRAM(S): at 21:49

## 2017-10-04 RX ADMIN — Medication 325 MILLIGRAM(S): at 21:50

## 2017-10-04 RX ADMIN — VORICONAZOLE 200 MILLIGRAM(S): 10 INJECTION, POWDER, LYOPHILIZED, FOR SOLUTION INTRAVENOUS at 06:44

## 2017-10-04 RX ADMIN — Medication 650 MILLIGRAM(S): at 09:30

## 2017-10-04 RX ADMIN — ALBUTEROL 2 PUFF(S): 90 AEROSOL, METERED ORAL at 09:39

## 2017-10-04 RX ADMIN — Medication 325 MILLIGRAM(S): at 06:43

## 2017-10-04 RX ADMIN — PANTOPRAZOLE SODIUM 40 MILLIGRAM(S): 20 TABLET, DELAYED RELEASE ORAL at 06:44

## 2017-10-04 RX ADMIN — Medication 1 TABLET(S): at 21:50

## 2017-10-04 NOTE — PROGRESS NOTE ADULT - ASSESSMENT
47 year old male with a PMHx of sarcoidosis, HTN, COPD, and asthma who initially presented to the ED with severe leg spasms. Prolonged hospital stay. Was found to have mycetoma on CT chest, and had resection on 9/24, back to OR 9/26 for bleeding. Today, overall appears stable, cultures from OR remain negative. Still on empiric vori and zosyn. Overall stable. Path from OR suggestive of aspergillosis. Bacterial culture neg. Would continue prolonged vori for treatment ? invasive aspergillosis on path.  - Continue Vori 200mg q 12 for 6 weeks (through 11/4/17)  - F/U OR cultures  - F/U in ID clinic on week of October 24 to decide on final antifungal duration  - Will sign off. Please call with further questions or change in status.    Stiven Olmstead MD  Pager [phone]  After 5pm and on weekends call [phone]

## 2017-10-04 NOTE — PROGRESS NOTE ADULT - SUBJECTIVE AND OBJECTIVE BOX
OSCAR CHACKO            MRN-[id number]         No Known Allergies               47 M PMH of sarcoidosis, HTN, COPD, and asthma who was initially admitted on 09/10 w cc of severe leg spasms X two days  In E.D. pt found to be wheezing, so he was seen by Pulmonary and started on IV Steroids for sarcoidosis  On 9/12 at 4 am, a RRT and Stroke codes were called as the patient became unresponsive.  He was transferred to MICU and then intubated for airway protection.   The patient was found to have a new infarct on MRI and also found to have a patent PFO on this admission as well. He was weaned off vent on 09/13 and transferred to the floors   There was concern patient was having paroxysmal afib, so he was started on Eliquis yesterday evening. He has received 2 doses of Eliquis on 09/19.   However, on 09/20, the patient had a coughing episode, which was not unusual for him, but during this episode he began to cough up fresh blood, about 1/2 cup of blood. He underwent Bronchoscopy with bronchoalveolar lavage of both sides and epi injection on 09/21 and was to have received further IR embolization for hemoptysis control in the next few days.  However, he developed hemoptysis again and underwent urgent LULectomy, complicated with post op bleeding.    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  COPD (chronic obstructive pulmonary disease)  Asthma  Hypertension  Sarcoidosis  No significant past surgical history        ICU Vital Signs Last 24 Hrs  T(C): 36.6 (04 Oct 2017 08:00), Max: 37.1 (03 Oct 2017 12:00)  T(F): 97.8 (04 Oct 2017 08:00), Max: 98.8 (04 Oct 2017 04:00)  HR: 91 (04 Oct 2017 09:39) (81 - 99)  BP: 121/69 (04 Oct 2017 09:00) (98/56 - 122/77)  BP(mean): 82 (04 Oct 2017 09:00) (59 - 82)  ABP: --  ABP(mean): --  RR: 18 (04 Oct 2017 09:00) (15 - 24)  SpO2: 98% (04 Oct 2017 09:39) (96% - 100%)    I&O's Summary    03 Oct 2017 07:01  -  04 Oct 2017 07:00  --------------------------------------------------------  IN: 735 mL / OUT: 1230 mL / NET: -495 mL    04 Oct 2017 07:01  -  04 Oct 2017 10:52  --------------------------------------------------------  IN: 0 mL / OUT: 0 mL / NET: 0 mL      CAPILLARY BLOOD GLUCOSE      MEDICATIONS  (STANDING):  ALBUTerol    90 MICROgram(s) HFA Inhaler 2 Puff(s) Inhalation every 6 hours  apixaban 5 milliGRAM(s) Oral every 12 hours  aspirin  chewable 81 milliGRAM(s) Oral daily  atorvastatin 80 milliGRAM(s) Oral at bedtime  chlorhexidine 4% Liquid 1 Application(s) Topical daily  diVALproex  milliGRAM(s) Oral two times a day  docusate sodium 100 milliGRAM(s) Oral two times a day  ferrous    sulfate 325 milliGRAM(s) Oral every 8 hours  folic acid 1 milliGRAM(s) Oral daily  ipratropium 17 MICROgram(s) HFA Inhaler 1 Puff(s) Inhalation every 6 hours  pantoprazole    Tablet 40 milliGRAM(s) Oral two times a day before meals  polyethylene glycol 3350 17 Gram(s) Oral at bedtime  potassium acid phosphate/sodium acid phosphate tablet (K-PHOS No. 2) 1 Tablet(s) Oral four times a day with meals  predniSONE   Tablet 10 milliGRAM(s) Oral daily  sodium chloride 0.9%. 1000 milliLiter(s) (10 mL/Hr) IV Continuous <Continuous>  voriconazole 200 milliGRAM(s) Oral every 12 hours    MEDICATIONS  (PRN):  acetaminophen    Suspension 650 milliGRAM(s) Oral every 6 hours PRN For Temp greater than 38 C (100.4 F)  acetaminophen   Tablet. 650 milliGRAM(s) Oral every 6 hours PRN Mild Pain (1 - 3)  aluminum hydroxide/magnesium hydroxide/simethicone Suspension 30 milliLiter(s) Oral every 6 hours PRN Dyspepsia  traMADol 25 milliGRAM(s) Oral every 4 hours PRN Moderate Pain (4 - 6)      Physical exam:     General:              Pt is awake, alert,                                                Neuro:                 focal,  weak   L>R  KAMLESH>LL                          Cardiovascular:    S1 & S2, regular                           Respiratory:         Air entry is fair and has bilateral conducted sounds, ronchii                           GI:                          Soft, nondistended and nontender, Bowel sounds active                            Ext:                        No cyanosis &  mild  bilateral  edema of upper ext    Labs:                                                                           8.7    11.31 )-----------( 194      ( 04 Oct 2017 02:50 )             27.5             10-04    143  |  98  |  15  ----------------------------<  107<H>  3.9   |  34<H>  |  0.88    Ca    8.2<L>      04 Oct 2017 02:50  Phos  2.4     10-04  Mg     2.1     10-04    TPro  5.4<L>  /  Alb  2.9<L>  /  TBili  0.3  /  DBili  x   /  AST  42<H>  /  ALT  27  /  AlkPhos  39<L>  10-03                  PT/INR - ( 04 Oct 2017 02:50 )   PT: 14.3 SEC;   INR: 1.27          PTT - ( 04 Oct 2017 02:50 )  PTT:25.9 SEC        CXR:    The heart is not enlarged.  Bilateral lung opacities and emphysematous changes are again noted.  A trace right pleural effusion is unchanged.  Left chest tube is unchanged in position.  Small left apical pneumothorax is not significantly changed. Minimal   increase in small left basilar pneumothorax.  There is increased left subcutaneous emphysema.      Plan:    47 M  s/p Left VATS and DAVID lobectomy on 9/24/17, RTOR for bleeding twice. Extubated, off pressors, OOB & ambulating with PT. feels stronger & breathing is better                               Neuro:                                         Pain control with Tylenol IV / PO    CVA - Continue PT. Physical medicine evaluation                             Cardiovascular:                                             Continue hemodynamic monitoring    PFO - On Eliquis 5mg/bid                            Respiratory:                                                                                                                                                                                                                                                                                                                                                                                                                                                                                                                                                                                                                                                                                                                                                                                                                                                                                                      Comfortable, not in any distress.                                         Monitor chest tube output                                         Pneumothorax: Chest tube to suction                                                                Sarcoidosis on Prednisone. Continue bronchodilators & pulmonary toilet                          GI                                         Tolerating PO                                         Continue GI prophylaxis with Protonix                                         Continue Zofran / Reglan for nausea - PRN	                                                                 Renal:                                                                                  Monitor I/Os and electrolytes                                                 Hem/ Onc:                                                                                  Monitor chest tube output     Anemia: Continue Iron / FA                                         Follow CBC                            Infectious disease:    Aspergilloma - On voriconazole 200 milliGRAM(s) Oral every 12 hours. Off Zosyn      Monitor for fever / leukocytosis.                                         All surgical incision / chest tube  sites look clean                            Endocrine                                          Continue Accu-Checks with coverage    All available pertinent clinical, laboratory, radiographic, hemodynamic, echocardiographic, respiratory data, microbiologic data and chart were reviewed and analyzed   Patient seen, examined and plan discussed with CT Surgeon Dr. Caldera / CTICU team during rounds.       is working on rehab placement when pt is ready          Len Kim MD

## 2017-10-04 NOTE — PROGRESS NOTE ADULT - PROBLEM SELECTOR PLAN 6
on steroids at this time: it was given only for a short duration: thinking his wheezing is secondary to rhinovirus infection: It will cont till tomorrow: thereafter would taper it down to off in next 2-3  10/1: change prednisone to 10 mg daily  10/02: cont prednisone  10/03: trop prednisone after todays dose  10/4 stop prednisone tomorrow

## 2017-10-04 NOTE — PROGRESS NOTE ADULT - PROBLEM SELECTOR PLAN 3
wheezing is present :pk pressures in 30's : started him on steroids again  bronchodilators ATC:  9/27; if he continues to show improvement: can decrease his solumedrol tomorrow  9/28: decreases his IV steroids to 20 mg once a day for a few days more  9/29: on 20 mg per day of solumedrol: would continue for few more days and then decrease it  9/30: doing ok: - wheezing: decrease his steroids to 10 mg a day from tomorrow  10/1: change to po prednisone 10 mg  10/02; cont po prednisone  10/3: stop prednisone after today,s dose?  10/4 : dc prednisone after tomorrow dose

## 2017-10-04 NOTE — PROGRESS NOTE ADULT - PROBLEM SELECTOR PLAN 1
S/P surgery : reop twice secondary to hemothroax: curently on pressors and ventilator support: He is wheezing extensively: started on IV steroids 20 mg twice a day , but should be given only for few days : follow his wheezing daily as soon as it is resolved; dc steroids For now their is no hemoptysis: He had twice reop yesterday secondary to hemothorax and bleeding from chest tube: currently on pressors and seated and on broad spectrum antibiotics.  9/27: he seems to be doing better today: for extubation  9/28: extubated: alert and awake and responding: He is on zosyn any way with voriconazole!  9/29: chest x-ray today , to me looks better then yesterday: There is no official report yet:  10/02: doing ok  10/3: on eliquis: toelrating well: no hemoptysis  10/4 : stable

## 2017-10-04 NOTE — PROGRESS NOTE ADULT - SUBJECTIVE AND OBJECTIVE BOX
Patient is a 47y old  Male who presents with a chief complaint of "My leg wouldn't stop shaking." (14 Sep 2017 09:59)    doing ok  Any change in ROS:     MEDICATIONS  (STANDING):  ALBUTerol    90 MICROgram(s) HFA Inhaler 2 Puff(s) Inhalation every 6 hours  apixaban 5 milliGRAM(s) Oral every 12 hours  aspirin  chewable 81 milliGRAM(s) Oral daily  atorvastatin 80 milliGRAM(s) Oral at bedtime  chlorhexidine 4% Liquid 1 Application(s) Topical daily  diVALproex  milliGRAM(s) Oral two times a day  docusate sodium 100 milliGRAM(s) Oral two times a day  ferrous    sulfate 325 milliGRAM(s) Oral every 8 hours  folic acid 1 milliGRAM(s) Oral daily  ipratropium 17 MICROgram(s) HFA Inhaler 1 Puff(s) Inhalation every 6 hours  pantoprazole    Tablet 40 milliGRAM(s) Oral two times a day before meals  polyethylene glycol 3350 17 Gram(s) Oral at bedtime  potassium acid phosphate/sodium acid phosphate tablet (K-PHOS No. 2) 1 Tablet(s) Oral four times a day with meals  predniSONE   Tablet 10 milliGRAM(s) Oral daily  sodium chloride 0.9%. 1000 milliLiter(s) (10 mL/Hr) IV Continuous <Continuous>  voriconazole 200 milliGRAM(s) Oral every 12 hours    MEDICATIONS  (PRN):  acetaminophen    Suspension 650 milliGRAM(s) Oral every 6 hours PRN For Temp greater than 38 C (100.4 F)  acetaminophen   Tablet. 650 milliGRAM(s) Oral every 6 hours PRN Mild Pain (1 - 3)  aluminum hydroxide/magnesium hydroxide/simethicone Suspension 30 milliLiter(s) Oral every 6 hours PRN Dyspepsia  traMADol 25 milliGRAM(s) Oral every 4 hours PRN Moderate Pain (4 - 6)    Vital Signs Last 24 Hrs  T(C): 36.6 (04 Oct 2017 16:00), Max: 37.1 (04 Oct 2017 04:00)  T(F): 97.8 (04 Oct 2017 16:00), Max: 98.8 (04 Oct 2017 04:00)  HR: 92 (04 Oct 2017 18:00) (81 - 105)  BP: 102/61 (04 Oct 2017 18:00) (97/58 - 123/77)  BP(mean): 71 (04 Oct 2017 18:00) (66 - 89)  RR: 21 (04 Oct 2017 18:00) (16 - 25)  SpO2: 100% (04 Oct 2017 18:00) (96% - 100%)    I&O's Summary    03 Oct 2017 07:01  -  04 Oct 2017 07:00  --------------------------------------------------------  IN: 735 mL / OUT: 1230 mL / NET: -495 mL    04 Oct 2017 07:01  -  04 Oct 2017 18:41  --------------------------------------------------------  IN: 480 mL / OUT: 520 mL / NET: -40 mL          Physical Exam:   GENERAL: NAD, well-groomed, well-developed  HEENT: GIOVANI/   Atraumatic, Normocephalic  ENMT: No tonsillar erythema, exudates, or enlargement; Moist mucous membranes, Good dentition, No lesions  NECK: Supple, No JVD, Normal thyroid  CHEST/LUNG: no wheezing   CVS: Regular rate and rhythm; No murmurs, rubs, or gallops  GI: : Soft, Nontender, Nondistended; Bowel sounds present  NERVOUS SYSTEM:  Alert & Oriented X3  EXTREMITIES:  2+ Peripheral Pulses, No clubbing, cyanosis, or edema  LYMPH: No lymphadenopathy noted  SKIN: No rashes or lesions  ENDOCRINOLOGY: No Thyromegaly  PSYCH: Appropriate    Labs:                              8.7    11.31 )-----------( 194      ( 04 Oct 2017 02:50 )             27.5                         8.4    11.49 )-----------( 175      ( 03 Oct 2017 03:40 )             26.3                         8.2    11.85 )-----------( 147      ( 02 Oct 2017 04:45 )             25.7                         7.7    10.21 )-----------( 121      ( 01 Oct 2017 04:00 )             23.6     10-04    143  |  98  |  15  ----------------------------<  107<H>  3.9   |  34<H>  |  0.88  10-03    144  |  101  |  15  ----------------------------<  93  4.0   |  35<H>  |  0.90  10-02    142  |  101  |  18  ----------------------------<  95  4.0   |  36<H>  |  0.90  10-01    142  |  99  |  20  ----------------------------<  111<H>  3.6   |  36<H>  |  0.96    Ca    8.2<L>      04 Oct 2017 02:50  Ca    8.1<L>      03 Oct 2017 03:40  Phos  2.4     10-04  Phos  2.8     10-03  Mg     2.1     10-04  Mg     2.0     10-03    TPro  5.4<L>  /  Alb  2.9<L>  /  TBili  0.3  /  DBili  x   /  AST  42<H>  /  ALT  27  /  AlkPhos  39<L>  10-03    CAPILLARY BLOOD GLUCOSE          LIVER FUNCTIONS - ( 03 Oct 2017 03:40 )  Alb: 2.9 g/dL / Pro: 5.4 g/dL / ALK PHOS: 39 u/L / ALT: 27 u/L / AST: 42 u/L / GGT: x           PT/INR - ( 04 Oct 2017 02:50 )   PT: 14.3 SEC;   INR: 1.27          PTT - ( 04 Oct 2017 02:50 )  PTT:25.9 SEC    Cultures:               < from: Xray Chest 1 View AP -PORTABLE-Routine (10.04.17 @ 07:20) >    October 3 at 4:18 PM:  Left-sided chest tube unchanged from the last exam overlying mid left   hemithorax. Small left basilar pneumothorax has improved since the last   study. Residual subcutaneous emphysema in the left chest. Bilateral   pulmonary opacities interstitial and confluent unchanged.    October 4 at 6:58 AM:  No interval change in the position of the left chest tube. Relative   paucity of markings in the left upper hemithorax may represent residual   pneumothorax on this side. Several linear opacities in this region but no   definite visceral pleural line. No basilar pneumothorax seen on the   current study.      COMPARISON:  October 3 at 8:35 AM      IMPRESSION:  Follow-up studies post left upper lobectomy with chest tube   and likely residual pneumothorax.    < end of copied text >                Studies  Chest X-RAY  CT SCAN Chest   Venous Dopplers: LE:   CT Abdomen  Others

## 2017-10-04 NOTE — PROGRESS NOTE ADULT - SUBJECTIVE AND OBJECTIVE BOX
CC: F/U for Mycetoma    Saw/spoke to patient. No fevers, no chills. No new complaints. Patient overall well, appears fatigued, no new complaints.    Allergies  No Known Allergies    ANTIMICROBIALS:  voriconazole 200 every 12 hours    PE:    Vital Signs Last 24 Hrs  T(C): 36.6 (04 Oct 2017 08:00), Max: 37.1 (03 Oct 2017 12:00)  T(F): 97.8 (04 Oct 2017 08:00), Max: 98.8 (04 Oct 2017 04:00)  HR: 91 (04 Oct 2017 09:39) (81 - 99)  BP: 121/69 (04 Oct 2017 09:00) (98/56 - 122/77)  BP(mean): 82 (04 Oct 2017 09:00) (59 - 82)  RR: 18 (04 Oct 2017 09:00) (13 - 24)  SpO2: 98% (04 Oct 2017 09:39) (96% - 100%)    Gen: AOx3, NAD, non-toxic, fatigued appearing  CV: S1+S2 normal, no murmurs, nontachycardic  Resp: Clear bilat, no resp distress, no crackles/wheezes; chest tube  Abd: Soft, nontender, +BS  Ext: No LE edema, no wounds    LABS:                        8.7    11.31 )-----------( 194      ( 04 Oct 2017 02:50 )             27.5     10-04    143  |  98  |  15  ----------------------------<  107<H>  3.9   |  34<H>  |  0.88    Ca    8.2<L>      04 Oct 2017 02:50  Phos  2.4     10-04  Mg     2.1     10-04    TPro  5.4<L>  /  Alb  2.9<L>  /  TBili  0.3  /  DBili  x   /  AST  42<H>  /  ALT  27  /  AlkPhos  39<L>  10-03    MICROBIOLOGY:    OTHER-OR CX  09-24-17 CX NGTD; AFB/Yeast NGTD    (otherwise reviewed)    RADIOLOGY:    10/3 CXR:    IMPRESSION:  Left chest tube unchanged in position.    Small left apical pneumothorax, not significant change. Minimal increase   in small left basilar pneumothorax.    Increased subcutaneous emphysema.

## 2017-10-05 LAB
ALBUMIN SERPL ELPH-MCNC: 3 G/DL — LOW (ref 3.3–5)
ALP SERPL-CCNC: 46 U/L — SIGNIFICANT CHANGE UP (ref 40–120)
ALT FLD-CCNC: 25 U/L — SIGNIFICANT CHANGE UP (ref 4–41)
AST SERPL-CCNC: 30 U/L — SIGNIFICANT CHANGE UP (ref 4–40)
BASOPHILS # BLD AUTO: 0.01 K/UL — SIGNIFICANT CHANGE UP (ref 0–0.2)
BASOPHILS NFR BLD AUTO: 0.1 % — SIGNIFICANT CHANGE UP (ref 0–2)
BILIRUB SERPL-MCNC: 0.3 MG/DL — SIGNIFICANT CHANGE UP (ref 0.2–1.2)
BUN SERPL-MCNC: 15 MG/DL — SIGNIFICANT CHANGE UP (ref 7–23)
CALCIUM SERPL-MCNC: 8.3 MG/DL — LOW (ref 8.4–10.5)
CHLORIDE SERPL-SCNC: 102 MMOL/L — SIGNIFICANT CHANGE UP (ref 98–107)
CO2 SERPL-SCNC: 36 MMOL/L — HIGH (ref 22–31)
CREAT SERPL-MCNC: 0.92 MG/DL — SIGNIFICANT CHANGE UP (ref 0.5–1.3)
EOSINOPHIL # BLD AUTO: 0.2 K/UL — SIGNIFICANT CHANGE UP (ref 0–0.5)
EOSINOPHIL NFR BLD AUTO: 2.4 % — SIGNIFICANT CHANGE UP (ref 0–6)
GLUCOSE SERPL-MCNC: 86 MG/DL — SIGNIFICANT CHANGE UP (ref 70–99)
HCT VFR BLD CALC: 28.6 % — LOW (ref 39–50)
HGB BLD-MCNC: 9 G/DL — LOW (ref 13–17)
IMM GRANULOCYTES # BLD AUTO: 0.05 # — SIGNIFICANT CHANGE UP
IMM GRANULOCYTES NFR BLD AUTO: 0.6 % — SIGNIFICANT CHANGE UP (ref 0–1.5)
LYMPHOCYTES # BLD AUTO: 1.47 K/UL — SIGNIFICANT CHANGE UP (ref 1–3.3)
LYMPHOCYTES # BLD AUTO: 17.4 % — SIGNIFICANT CHANGE UP (ref 13–44)
MCHC RBC-ENTMCNC: 30.4 PG — SIGNIFICANT CHANGE UP (ref 27–34)
MCHC RBC-ENTMCNC: 31.5 % — LOW (ref 32–36)
MCV RBC AUTO: 96.6 FL — SIGNIFICANT CHANGE UP (ref 80–100)
MONOCYTES # BLD AUTO: 1.12 K/UL — HIGH (ref 0–0.9)
MONOCYTES NFR BLD AUTO: 13.2 % — SIGNIFICANT CHANGE UP (ref 2–14)
NEUTROPHILS # BLD AUTO: 5.62 K/UL — SIGNIFICANT CHANGE UP (ref 1.8–7.4)
NEUTROPHILS NFR BLD AUTO: 66.3 % — SIGNIFICANT CHANGE UP (ref 43–77)
NRBC # FLD: 0 — SIGNIFICANT CHANGE UP
PLATELET # BLD AUTO: 176 K/UL — SIGNIFICANT CHANGE UP (ref 150–400)
PMV BLD: 12.8 FL — SIGNIFICANT CHANGE UP (ref 7–13)
POTASSIUM SERPL-MCNC: 3.9 MMOL/L — SIGNIFICANT CHANGE UP (ref 3.5–5.3)
POTASSIUM SERPL-SCNC: 3.9 MMOL/L — SIGNIFICANT CHANGE UP (ref 3.5–5.3)
PROT SERPL-MCNC: 5.6 G/DL — LOW (ref 6–8.3)
RBC # BLD: 2.96 M/UL — LOW (ref 4.2–5.8)
RBC # FLD: 16.5 % — HIGH (ref 10.3–14.5)
SODIUM SERPL-SCNC: 143 MMOL/L — SIGNIFICANT CHANGE UP (ref 135–145)
WBC # BLD: 8.47 K/UL — SIGNIFICANT CHANGE UP (ref 3.8–10.5)
WBC # FLD AUTO: 8.47 K/UL — SIGNIFICANT CHANGE UP (ref 3.8–10.5)

## 2017-10-05 PROCEDURE — 71010: CPT | Mod: 26

## 2017-10-05 RX ADMIN — Medication 1 PUFF(S): at 04:58

## 2017-10-05 RX ADMIN — Medication 650 MILLIGRAM(S): at 06:11

## 2017-10-05 RX ADMIN — Medication 1 PUFF(S): at 10:47

## 2017-10-05 RX ADMIN — Medication 325 MILLIGRAM(S): at 06:11

## 2017-10-05 RX ADMIN — Medication 10 MILLIGRAM(S): at 06:11

## 2017-10-05 RX ADMIN — Medication 1 MILLIGRAM(S): at 12:19

## 2017-10-05 RX ADMIN — Medication 650 MILLIGRAM(S): at 06:46

## 2017-10-05 RX ADMIN — TRAMADOL HYDROCHLORIDE 25 MILLIGRAM(S): 50 TABLET ORAL at 22:40

## 2017-10-05 RX ADMIN — PANTOPRAZOLE SODIUM 40 MILLIGRAM(S): 20 TABLET, DELAYED RELEASE ORAL at 06:11

## 2017-10-05 RX ADMIN — DIVALPROEX SODIUM 500 MILLIGRAM(S): 500 TABLET, DELAYED RELEASE ORAL at 06:11

## 2017-10-05 RX ADMIN — Medication 1 TABLET(S): at 12:19

## 2017-10-05 RX ADMIN — Medication 1 PUFF(S): at 16:26

## 2017-10-05 RX ADMIN — Medication 100 MILLIGRAM(S): at 06:11

## 2017-10-05 RX ADMIN — ALBUTEROL 2 PUFF(S): 90 AEROSOL, METERED ORAL at 16:25

## 2017-10-05 RX ADMIN — TRAMADOL HYDROCHLORIDE 25 MILLIGRAM(S): 50 TABLET ORAL at 10:30

## 2017-10-05 RX ADMIN — ALBUTEROL 2 PUFF(S): 90 AEROSOL, METERED ORAL at 10:47

## 2017-10-05 RX ADMIN — VORICONAZOLE 200 MILLIGRAM(S): 10 INJECTION, POWDER, LYOPHILIZED, FOR SOLUTION INTRAVENOUS at 17:15

## 2017-10-05 RX ADMIN — DIVALPROEX SODIUM 500 MILLIGRAM(S): 500 TABLET, DELAYED RELEASE ORAL at 17:15

## 2017-10-05 RX ADMIN — APIXABAN 5 MILLIGRAM(S): 2.5 TABLET, FILM COATED ORAL at 17:15

## 2017-10-05 RX ADMIN — Medication 650 MILLIGRAM(S): at 15:45

## 2017-10-05 RX ADMIN — Medication 81 MILLIGRAM(S): at 12:18

## 2017-10-05 RX ADMIN — Medication 1 TABLET(S): at 21:39

## 2017-10-05 RX ADMIN — Medication 1 TABLET(S): at 10:35

## 2017-10-05 RX ADMIN — VORICONAZOLE 200 MILLIGRAM(S): 10 INJECTION, POWDER, LYOPHILIZED, FOR SOLUTION INTRAVENOUS at 06:13

## 2017-10-05 RX ADMIN — TRAMADOL HYDROCHLORIDE 25 MILLIGRAM(S): 50 TABLET ORAL at 11:00

## 2017-10-05 RX ADMIN — TRAMADOL HYDROCHLORIDE 25 MILLIGRAM(S): 50 TABLET ORAL at 21:39

## 2017-10-05 RX ADMIN — APIXABAN 5 MILLIGRAM(S): 2.5 TABLET, FILM COATED ORAL at 06:11

## 2017-10-05 RX ADMIN — ALBUTEROL 2 PUFF(S): 90 AEROSOL, METERED ORAL at 04:58

## 2017-10-05 RX ADMIN — Medication 325 MILLIGRAM(S): at 13:36

## 2017-10-05 RX ADMIN — Medication 650 MILLIGRAM(S): at 15:15

## 2017-10-05 RX ADMIN — PANTOPRAZOLE SODIUM 40 MILLIGRAM(S): 20 TABLET, DELAYED RELEASE ORAL at 15:19

## 2017-10-05 RX ADMIN — ATORVASTATIN CALCIUM 80 MILLIGRAM(S): 80 TABLET, FILM COATED ORAL at 21:40

## 2017-10-05 RX ADMIN — CHLORHEXIDINE GLUCONATE 1 APPLICATION(S): 213 SOLUTION TOPICAL at 06:14

## 2017-10-05 RX ADMIN — Medication 1 TABLET(S): at 17:16

## 2017-10-05 RX ADMIN — Medication 325 MILLIGRAM(S): at 21:39

## 2017-10-05 NOTE — PROGRESS NOTE ADULT - SUBJECTIVE AND OBJECTIVE BOX
OSCAR CHACKO            MRN-[id number]         No Known Allergies               47 M PMH of sarcoidosis, HTN, COPD, and asthma who was initially admitted on 09/10 w cc of severe leg spasms X two days  In E.D. pt found to be wheezing, so he was seen by Pulmonary and started on IV Steroids for sarcoidosis  On 9/12 at 4 am, a RRT and Stroke codes were called as the patient became unresponsive.  He was transferred to MICU and then intubated for airway protection.   The patient was found to have a new infarct on MRI and also found to have a patent PFO on this admission as well. He was weaned off vent on 09/13 and transferred to the floors   There was concern patient was having paroxysmal afib, so he was started on Eliquis yesterday evening. He has received 2 doses of Eliquis on 09/19.   However, on 09/20, the patient had a coughing episode, which was not unusual for him, but during this episode he began to cough up fresh blood, about 1/2 cup of blood. He underwent Bronchoscopy with bronchoalveolar lavage of both sides and epi injection on 09/21 and was to have received further IR embolization for hemoptysis control in the next few days.  However, he developed hemoptysis again and underwent urgent LULectomy, complicated with post op bleeding.    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  COPD (chronic obstructive pulmonary disease)  Asthma  Hypertension  Sarcoidosis  No significant past surgical history        ICU Vital Signs Last 24 Hrs  T(C): 37 (05 Oct 2017 08:00), Max: 37.6 (04 Oct 2017 20:00)  T(F): 98.6 (05 Oct 2017 08:00), Max: 99.6 (04 Oct 2017 20:00)  HR: 92 (05 Oct 2017 10:48) (75 - 107)  BP: 125/62 (05 Oct 2017 10:00) (93/65 - 142/89)  BP(mean): 72 (05 Oct 2017 10:00) (67 - 100)  ABP: --  ABP(mean): --  RR: 23 (05 Oct 2017 10:00) (16 - 25)  SpO2: 99% (05 Oct 2017 10:48) (80% - 100%)    I&O's Summary    04 Oct 2017 07:01  -  05 Oct 2017 07:00  --------------------------------------------------------  IN: 680 mL / OUT: 950 mL / NET: -270 mL    05 Oct 2017 07:01  -  05 Oct 2017 11:31  --------------------------------------------------------  IN: 120 mL / OUT: 480 mL / NET: -360 mL      CAPILLARY BLOOD GLUCOSE          MEDICATIONS  (STANDING):  ALBUTerol    90 MICROgram(s) HFA Inhaler 2 Puff(s) Inhalation every 6 hours  apixaban 5 milliGRAM(s) Oral every 12 hours  aspirin  chewable 81 milliGRAM(s) Oral daily  atorvastatin 80 milliGRAM(s) Oral at bedtime  chlorhexidine 4% Liquid 1 Application(s) Topical daily  diVALproex  milliGRAM(s) Oral two times a day  docusate sodium 100 milliGRAM(s) Oral two times a day  ferrous    sulfate 325 milliGRAM(s) Oral every 8 hours  folic acid 1 milliGRAM(s) Oral daily  ipratropium 17 MICROgram(s) HFA Inhaler 1 Puff(s) Inhalation every 6 hours  pantoprazole    Tablet 40 milliGRAM(s) Oral two times a day before meals  polyethylene glycol 3350 17 Gram(s) Oral at bedtime  potassium acid phosphate/sodium acid phosphate tablet (K-PHOS No. 2) 1 Tablet(s) Oral four times a day with meals  predniSONE   Tablet 10 milliGRAM(s) Oral daily  voriconazole 200 milliGRAM(s) Oral every 12 hours    MEDICATIONS  (PRN):  acetaminophen    Suspension 650 milliGRAM(s) Oral every 6 hours PRN For Temp greater than 38 C (100.4 F)  acetaminophen   Tablet. 650 milliGRAM(s) Oral every 6 hours PRN Mild Pain (1 - 3)  aluminum hydroxide/magnesium hydroxide/simethicone Suspension 30 milliLiter(s) Oral every 6 hours PRN Dyspepsia  traMADol 25 milliGRAM(s) Oral every 4 hours PRN Moderate Pain (4 - 6)      Physical exam:   General:              Pt is awake, alert,                                                Neuro:                 focal,  weak   L>R  KAMLESH>LL                          Cardiovascular:    S1 & S2, regular                           Respiratory:         Air entry is decreased at left upper chest and has bilateral conducted sounds, rhonchi                           GI:                          Soft, nondistended and nontender, Bowel sounds active                            Ext:                        No cyanosis &  mild  bilateral  edema of upper ext                            Labs:                                                                           9.0    8.47  )-----------( 176      ( 05 Oct 2017 06:32 )             28.6             10-05    143  |  102  |  15  ----------------------------<  86  3.9   |  36<H>  |  0.92    Ca    8.3<L>      05 Oct 2017 06:32  Phos  2.4     10-04  Mg     2.1     10-04    TPro  5.6<L>  /  Alb  3.0<L>  /  TBili  0.3  /  DBili  x   /  AST  30  /  ALT  25  /  AlkPhos  46  10-05                  PT/INR - ( 04 Oct 2017 02:50 )   PT: 14.3 SEC;   INR: 1.27          PTT - ( 04 Oct 2017 02:50 )  PTT:25.9 SEC        CXR:  < from: Xray Chest 1 View AP -PORTABLE-Routine (10.05.17 @ 07:41) >  The heart is not enlarged.  Left lung postsurgical changes are again seen.  Left chest tube is unchanged in position and left apical pneumothorax is   not significantly changed.  Subcutaneous emphysema is again seen appearing slightly increased in also   noted in the right supraclavicular region.  There are small bilateral pleural effusions.  Bilateral lung opacities are unchanged.    AP portable chest x-ray from October 5, 2017 at 6:34 AM:    There is no significant interval damon        Plan:    47 M  s/p Left VATS and DAVID lobectomy on 9/24/17, RTOR for bleeding twice. Extubated, off pressors, OOB & ambulating with PT. feels stronger & breathing is better                               Neuro:                                         Pain control with Tylenol IV / PO    CVA - Continue PT. Physical medicine evaluation.                              Cardiovascular:                                             Continue hemodynamic monitoring    PFO - On Eliquis 5mg/bid                            Respiratory:                                                                                                                                                                                                                                                                                                                                                                                                                                                                                                                                                                                                                                                                                                                                                                                                                                                                                                      Comfortable, not in any distress.                                         Monitor chest tube output                                         Pneumothorax: Chest tube to suction, -40cm. Small air leak                                                                Sarcoidosis on Prednisone. d/w Pulmonary - d/c prednisone.  Continue bronchodilators & pulmonary toilet                          GI                                         Tolerating PO                                         Continue GI prophylaxis with Protonix                                         Continue Zofran / Reglan for nausea - PRN	                                                                 Renal:                                                                                  Monitor I/Os and electrolytes                                                 Hem/ Onc:                                                                                  Monitor chest tube output     Anemia: Continue Iron / FA                            Infectious disease:    Aspergilloma - On voriconazole 200 milliGRAM(s) Oral every 12 hours. Off Zosyn      Monitor for fever / leukocytosis.                                         All surgical incision / chest tube  sites look clean                            Endocrine                                          Continue Accu-Checks with coverage    All available pertinent clinical, laboratory, radiographic, hemodynamic, echocardiographic, respiratory data, microbiologic data and chart were reviewed and analyzed   Patient seen, examined and plan discussed with CT Surgeon Dr. Caldera / CTICU team during rounds.       is working on rehab placement when pt is ready early next week    Rehab medicine consult called        Len Kim MD

## 2017-10-05 NOTE — PROGRESS NOTE ADULT - PROBLEM SELECTOR PLAN 1
S/P surgery : reop twice secondary to hemothroax: curently on pressors and ventilator support: He is wheezing extensively: started on IV steroids 20 mg twice a day , but should be given only for few days : follow his wheezing daily as soon as it is resolved; dc steroids For now their is no hemoptysis: He had twice reop yesterday secondary to hemothorax and bleeding from chest tube: currently on pressors and seated and on broad spectrum antibiotics.  9/27: he seems to be doing better today: for extubation  9/28: extubated: alert and awake and responding: He is on zosyn any way with voriconazole!  9/29: chest x-ray today , to me looks better then yesterday: There is no official report yet:  10/02: doing ok  10/3: on eliquis: toelrating well: no hemoptysis  10/4 : stable  10/05: persistent left upper air space following DAVID lobectomy: defer to thoracic surgery

## 2017-10-05 NOTE — PROGRESS NOTE ADULT - PROBLEM SELECTOR PLAN 6
on steroids at this time: it was given only for a short duration: thinking his wheezing is secondary to rhinovirus infection: It will cont till tomorrow: thereafter would taper it down to off in next 2-3  10/1: change prednisone to 10 mg daily  10/02: cont prednisone  10/03: trop prednisone after today's dose  10/4 stop prednisone tomorrow  10/5: dc prednisone today : pt was not on any treatment for sarcoidosis before

## 2017-10-05 NOTE — PROGRESS NOTE ADULT - PROBLEM SELECTOR PLAN 3
wheezing is present :pk pressures in 30's : started him on steroids again  bronchodilators ATC:  9/27; if he continues to show improvement: can decrease his solumedrol tomorrow  9/28: decreases his IV steroids to 20 mg once a day for a few days more  9/29: on 20 mg per day of solumedrol: would continue for few more days and then decrease it  9/30: doing ok: - wheezing: decrease his steroids to 10 mg a day from tomorrow  10/1: change to po prednisone 10 mg  10/02; cont po prednisone  10/3: stop prednisone after today,s dose?  10/4 : dc prednisone after tomorrow dose  10/5: DC predniosne today

## 2017-10-05 NOTE — PROGRESS NOTE ADULT - SUBJECTIVE AND OBJECTIVE BOX
Patient is a 47y old  Male who presents with a chief complaint of "My leg wouldn't stop shaking." (14 Sep 2017 09:59)  pt is doing ok  no SOB   no wheezing  alert and awake     Any change in ROS:     MEDICATIONS  (STANDING):  ALBUTerol    90 MICROgram(s) HFA Inhaler 2 Puff(s) Inhalation every 6 hours  apixaban 5 milliGRAM(s) Oral every 12 hours  aspirin  chewable 81 milliGRAM(s) Oral daily  atorvastatin 80 milliGRAM(s) Oral at bedtime  chlorhexidine 4% Liquid 1 Application(s) Topical daily  diVALproex  milliGRAM(s) Oral two times a day  docusate sodium 100 milliGRAM(s) Oral two times a day  ferrous    sulfate 325 milliGRAM(s) Oral every 8 hours  folic acid 1 milliGRAM(s) Oral daily  ipratropium 17 MICROgram(s) HFA Inhaler 1 Puff(s) Inhalation every 6 hours  pantoprazole    Tablet 40 milliGRAM(s) Oral two times a day before meals  polyethylene glycol 3350 17 Gram(s) Oral at bedtime  potassium acid phosphate/sodium acid phosphate tablet (K-PHOS No. 2) 1 Tablet(s) Oral four times a day with meals  voriconazole 200 milliGRAM(s) Oral every 12 hours    MEDICATIONS  (PRN):  acetaminophen    Suspension 650 milliGRAM(s) Oral every 6 hours PRN For Temp greater than 38 C (100.4 F)  acetaminophen   Tablet. 650 milliGRAM(s) Oral every 6 hours PRN Mild Pain (1 - 3)  aluminum hydroxide/magnesium hydroxide/simethicone Suspension 30 milliLiter(s) Oral every 6 hours PRN Dyspepsia  traMADol 25 milliGRAM(s) Oral every 4 hours PRN Moderate Pain (4 - 6)    Vital Signs Last 24 Hrs  T(C): 37 (05 Oct 2017 08:00), Max: 37.6 (04 Oct 2017 20:00)  T(F): 98.6 (05 Oct 2017 08:00), Max: 99.6 (04 Oct 2017 20:00)  HR: 92 (05 Oct 2017 10:48) (75 - 107)  BP: 125/62 (05 Oct 2017 10:00) (93/65 - 142/89)  BP(mean): 72 (05 Oct 2017 10:00) (67 - 100)  RR: 23 (05 Oct 2017 10:00) (16 - 25)  SpO2: 99% (05 Oct 2017 10:48) (80% - 100%)    I&O's Summary    04 Oct 2017 07:01  -  05 Oct 2017 07:00  --------------------------------------------------------  IN: 680 mL / OUT: 950 mL / NET: -270 mL    05 Oct 2017 07:01  -  05 Oct 2017 12:38  --------------------------------------------------------  IN: 120 mL / OUT: 480 mL / NET: -360 mL          Physical Exam:   GENERAL: NAD, well-groomed, well-developed  HEENT: GIOVANI/   Atraumatic, Normocephalic  ENMT: No tonsillar erythema, exudates, or enlargement; Moist mucous membranes, Good dentition, No lesions  NECK: Supple, No JVD, Normal thyroid  CHEST/LUNG: Coarse rhonchi bilaterally : no wheezing   CVS: Regular rate and rhythm; No murmurs, rubs, or gallops  GI: : Soft, Nontender, Nondistended; Bowel sounds present  NERVOUS SYSTEM:  Alert & Oriented X3  EXTREMITIES:  2+ Peripheral Pulses, No clubbing, cyanosis, or edema  LYMPH: No lymphadenopathy noted  SKIN: No rashes or lesions  ENDOCRINOLOGY: No Thyromegaly  PSYCH: Appropriate    Labs:                              9.0    8.47  )-----------( 176      ( 05 Oct 2017 06:32 )             28.6                         8.7    11.31 )-----------( 194      ( 04 Oct 2017 02:50 )             27.5                         8.4    11.49 )-----------( 175      ( 03 Oct 2017 03:40 )             26.3                         8.2    11.85 )-----------( 147      ( 02 Oct 2017 04:45 )             25.7     10-05    143  |  102  |  15  ----------------------------<  86  3.9   |  36<H>  |  0.92  10-04    143  |  98  |  15  ----------------------------<  107<H>  3.9   |  34<H>  |  0.88  10-03    144  |  101  |  15  ----------------------------<  93  4.0   |  35<H>  |  0.90  10-02    142  |  101  |  18  ----------------------------<  95  4.0   |  36<H>  |  0.90    Ca    8.3<L>      05 Oct 2017 06:32  Ca    8.2<L>      04 Oct 2017 02:50  Phos  2.4     10-04  Mg     2.1     10-04    TPro  5.6<L>  /  Alb  3.0<L>  /  TBili  0.3  /  DBili  x   /  AST  30  /  ALT  25  /  AlkPhos  46  10-05  TPro  5.4<L>  /  Alb  2.9<L>  /  TBili  0.3  /  DBili  x   /  AST  42<H>  /  ALT  27  /  AlkPhos  39<L>  10-03    CAPILLARY BLOOD GLUCOSE          LIVER FUNCTIONS - ( 05 Oct 2017 06:32 )  Alb: 3.0 g/dL / Pro: 5.6 g/dL / ALK PHOS: 46 u/L / ALT: 25 u/L / AST: 30 u/L / GGT: x           PT/INR - ( 04 Oct 2017 02:50 )   PT: 14.3 SEC;   INR: 1.27          PTT - ( 04 Oct 2017 02:50 )  PTT:25.9 SEC    Cultures:     < from: Xray Chest 1 View AP -PORTABLE-Routine (10.05.17 @ 07:41) >  Left lung postsurgical changes are again seen.  Left chest tube is unchanged in position and left apical pneumothorax is   not significantly changed.  Subcutaneous emphysema is again seen appearing slightly increased in also   noted in the right supraclavicular region.  There are small bilateral pleural effusions.  Bilateral lung opacities are unchanged.    AP portable chest x-ray from October 5, 2017 at 6:34 AM:    There is no significant interval change.              IMPRESSION:  Left chest tube unchanged in position and left apical   pneumothorax is unchanged.    Subcutaneous emphysema, greater on the left again noted.    Unchanged bilateral lung opacities and small pleural effusions.                  KEERTHI FIELDS M.D., ATTENDING RADIOLOGIST  This document has been electronically signed. Oct  5 2017  8:37AM        < end of copied text >                          Studies  Chest X-RAY  CT SCAN Chest   Venous Dopplers: LE:   CT Abdomen  Others

## 2017-10-06 LAB
APTT BLD: 28.5 SEC — SIGNIFICANT CHANGE UP (ref 27.5–37.4)
BASE EXCESS BLDA CALC-SCNC: 11.7 MMOL/L — SIGNIFICANT CHANGE UP
BLD GP AB SCN SERPL QL: NEGATIVE — SIGNIFICANT CHANGE UP
BUN SERPL-MCNC: 13 MG/DL — SIGNIFICANT CHANGE UP (ref 7–23)
CALCIUM SERPL-MCNC: 8.5 MG/DL — SIGNIFICANT CHANGE UP (ref 8.4–10.5)
CHLORIDE SERPL-SCNC: 102 MMOL/L — SIGNIFICANT CHANGE UP (ref 98–107)
CO2 SERPL-SCNC: 35 MMOL/L — HIGH (ref 22–31)
CREAT SERPL-MCNC: 0.89 MG/DL — SIGNIFICANT CHANGE UP (ref 0.5–1.3)
GLUCOSE BLDA-MCNC: 90 MG/DL — SIGNIFICANT CHANGE UP (ref 70–99)
GLUCOSE SERPL-MCNC: 89 MG/DL — SIGNIFICANT CHANGE UP (ref 70–99)
HCO3 BLDA-SCNC: 35 MMOL/L — HIGH (ref 22–26)
HCT VFR BLD CALC: 28.3 % — LOW (ref 39–50)
HCT VFR BLDA CALC: 28.7 % — LOW (ref 39–51)
HGB BLD-MCNC: 8.8 G/DL — LOW (ref 13–17)
HGB BLDA-MCNC: 9.2 G/DL — LOW (ref 13–17)
INR BLD: 1.28 — HIGH (ref 0.88–1.17)
MCHC RBC-ENTMCNC: 30.4 PG — SIGNIFICANT CHANGE UP (ref 27–34)
MCHC RBC-ENTMCNC: 31.1 % — LOW (ref 32–36)
MCV RBC AUTO: 97.9 FL — SIGNIFICANT CHANGE UP (ref 80–100)
NRBC # FLD: 0 — SIGNIFICANT CHANGE UP
PCO2 BLDA: 59 MMHG — HIGH (ref 35–48)
PH BLDA: 7.41 PH — SIGNIFICANT CHANGE UP (ref 7.35–7.45)
PLATELET # BLD AUTO: 179 K/UL — SIGNIFICANT CHANGE UP (ref 150–400)
PMV BLD: 13.3 FL — HIGH (ref 7–13)
PO2 BLDA: 225 MMHG — HIGH (ref 83–108)
POTASSIUM BLDA-SCNC: 4.1 MMOL/L — SIGNIFICANT CHANGE UP (ref 3.4–4.5)
POTASSIUM SERPL-MCNC: 4.4 MMOL/L — SIGNIFICANT CHANGE UP (ref 3.5–5.3)
POTASSIUM SERPL-SCNC: 4.4 MMOL/L — SIGNIFICANT CHANGE UP (ref 3.5–5.3)
PROTHROM AB SERPL-ACNC: 14.4 SEC — HIGH (ref 9.8–13.1)
RBC # BLD: 2.89 M/UL — LOW (ref 4.2–5.8)
RBC # FLD: 16.8 % — HIGH (ref 10.3–14.5)
RH IG SCN BLD-IMP: POSITIVE — SIGNIFICANT CHANGE UP
SAO2 % BLDA: 100 % — HIGH (ref 95–99)
SODIUM BLDA-SCNC: 140 MMOL/L — SIGNIFICANT CHANGE UP (ref 136–146)
SODIUM SERPL-SCNC: 146 MMOL/L — HIGH (ref 135–145)
WBC # BLD: 7.44 K/UL — SIGNIFICANT CHANGE UP (ref 3.8–10.5)
WBC # FLD AUTO: 7.44 K/UL — SIGNIFICANT CHANGE UP (ref 3.8–10.5)

## 2017-10-06 PROCEDURE — 32557 INSERT CATH PLEURA W/ IMAGE: CPT | Mod: LT

## 2017-10-06 PROCEDURE — 99291 CRITICAL CARE FIRST HOUR: CPT

## 2017-10-06 PROCEDURE — 71010: CPT | Mod: 26

## 2017-10-06 RX ORDER — HYDROMORPHONE HYDROCHLORIDE 2 MG/ML
1 INJECTION INTRAMUSCULAR; INTRAVENOUS; SUBCUTANEOUS EVERY 4 HOURS
Qty: 0 | Refills: 0 | Status: DISCONTINUED | OUTPATIENT
Start: 2017-10-06 | End: 2017-10-12

## 2017-10-06 RX ORDER — ALBUMIN HUMAN 25 %
250 VIAL (ML) INTRAVENOUS ONCE
Qty: 0 | Refills: 0 | Status: COMPLETED | OUTPATIENT
Start: 2017-10-06 | End: 2017-10-06

## 2017-10-06 RX ORDER — HYDROCORTISONE 20 MG
40 TABLET ORAL EVERY 8 HOURS
Qty: 0 | Refills: 0 | Status: DISCONTINUED | OUTPATIENT
Start: 2017-10-06 | End: 2017-10-06

## 2017-10-06 RX ORDER — OXYCODONE HYDROCHLORIDE 5 MG/1
5 TABLET ORAL EVERY 4 HOURS
Qty: 0 | Refills: 0 | Status: DISCONTINUED | OUTPATIENT
Start: 2017-10-06 | End: 2017-10-06

## 2017-10-06 RX ORDER — HYDROCORTISONE 20 MG
100 TABLET ORAL ONCE
Qty: 0 | Refills: 0 | Status: COMPLETED | OUTPATIENT
Start: 2017-10-06 | End: 2017-10-06

## 2017-10-06 RX ORDER — ACETAMINOPHEN 500 MG
650 TABLET ORAL EVERY 4 HOURS
Qty: 0 | Refills: 0 | Status: DISCONTINUED | OUTPATIENT
Start: 2017-10-06 | End: 2017-10-11

## 2017-10-06 RX ORDER — OXYCODONE HYDROCHLORIDE 5 MG/1
10 TABLET ORAL EVERY 4 HOURS
Qty: 0 | Refills: 0 | Status: DISCONTINUED | OUTPATIENT
Start: 2017-10-06 | End: 2017-10-06

## 2017-10-06 RX ADMIN — PANTOPRAZOLE SODIUM 40 MILLIGRAM(S): 20 TABLET, DELAYED RELEASE ORAL at 05:25

## 2017-10-06 RX ADMIN — VORICONAZOLE 200 MILLIGRAM(S): 10 INJECTION, POWDER, LYOPHILIZED, FOR SOLUTION INTRAVENOUS at 20:35

## 2017-10-06 RX ADMIN — APIXABAN 5 MILLIGRAM(S): 2.5 TABLET, FILM COATED ORAL at 20:30

## 2017-10-06 RX ADMIN — DIVALPROEX SODIUM 500 MILLIGRAM(S): 500 TABLET, DELAYED RELEASE ORAL at 05:25

## 2017-10-06 RX ADMIN — POLYETHYLENE GLYCOL 3350 17 GRAM(S): 17 POWDER, FOR SOLUTION ORAL at 22:06

## 2017-10-06 RX ADMIN — ALBUTEROL 2 PUFF(S): 90 AEROSOL, METERED ORAL at 05:27

## 2017-10-06 RX ADMIN — Medication 1 PUFF(S): at 05:26

## 2017-10-06 RX ADMIN — VORICONAZOLE 200 MILLIGRAM(S): 10 INJECTION, POWDER, LYOPHILIZED, FOR SOLUTION INTRAVENOUS at 05:25

## 2017-10-06 RX ADMIN — Medication 1 PUFF(S): at 16:31

## 2017-10-06 RX ADMIN — ALBUTEROL 2 PUFF(S): 90 AEROSOL, METERED ORAL at 21:58

## 2017-10-06 RX ADMIN — HYDROMORPHONE HYDROCHLORIDE 1 MILLIGRAM(S): 2 INJECTION INTRAMUSCULAR; INTRAVENOUS; SUBCUTANEOUS at 00:27

## 2017-10-06 RX ADMIN — Medication 325 MILLIGRAM(S): at 22:06

## 2017-10-06 RX ADMIN — HYDROMORPHONE HYDROCHLORIDE 1 MILLIGRAM(S): 2 INJECTION INTRAMUSCULAR; INTRAVENOUS; SUBCUTANEOUS at 00:55

## 2017-10-06 RX ADMIN — OXYCODONE HYDROCHLORIDE 5 MILLIGRAM(S): 5 TABLET ORAL at 10:10

## 2017-10-06 RX ADMIN — Medication 325 MILLIGRAM(S): at 05:25

## 2017-10-06 RX ADMIN — Medication 100 MILLIGRAM(S): at 20:32

## 2017-10-06 RX ADMIN — Medication 100 MILLIGRAM(S): at 05:25

## 2017-10-06 RX ADMIN — DIVALPROEX SODIUM 500 MILLIGRAM(S): 500 TABLET, DELAYED RELEASE ORAL at 20:32

## 2017-10-06 RX ADMIN — Medication 250 MILLILITER(S): at 22:57

## 2017-10-06 RX ADMIN — Medication 1 PUFF(S): at 21:58

## 2017-10-06 RX ADMIN — Medication 100 MILLIGRAM(S): at 17:00

## 2017-10-06 RX ADMIN — OXYCODONE HYDROCHLORIDE 5 MILLIGRAM(S): 5 TABLET ORAL at 09:40

## 2017-10-06 RX ADMIN — ATORVASTATIN CALCIUM 80 MILLIGRAM(S): 80 TABLET, FILM COATED ORAL at 22:06

## 2017-10-06 RX ADMIN — PANTOPRAZOLE SODIUM 40 MILLIGRAM(S): 20 TABLET, DELAYED RELEASE ORAL at 20:35

## 2017-10-06 RX ADMIN — Medication 1 PUFF(S): at 00:19

## 2017-10-06 RX ADMIN — OXYCODONE HYDROCHLORIDE 5 MILLIGRAM(S): 5 TABLET ORAL at 22:07

## 2017-10-06 RX ADMIN — ALBUTEROL 2 PUFF(S): 90 AEROSOL, METERED ORAL at 00:19

## 2017-10-06 RX ADMIN — APIXABAN 5 MILLIGRAM(S): 2.5 TABLET, FILM COATED ORAL at 05:25

## 2017-10-06 RX ADMIN — ALBUTEROL 2 PUFF(S): 90 AEROSOL, METERED ORAL at 16:31

## 2017-10-06 RX ADMIN — OXYCODONE HYDROCHLORIDE 5 MILLIGRAM(S): 5 TABLET ORAL at 22:30

## 2017-10-06 NOTE — PROGRESS NOTE ADULT - SUBJECTIVE AND OBJECTIVE BOX
S:I can't walk now I;m having some trouble breathing  O: Mild use of  accessory muscle to breathe       RR=26 On NC@3 liters 99%02Sat, , B/P       CXR -> Moderate size left apical PTX       Left Chest tube to -40cm suction    A: 46yo male s/p Flex bronch on 9/21: s/p L.Vats Left thoracotomy-DAVID; s/p re-op left thoracotomy evacuation of hemothorax    P: IR to place PTC to left apical PTX  The procedure was explained to the pt. the risk /benefit of the procedure were reviewed. Since he is on eliquis I explained to the pt that        bleeding is a risk that can occur with this procedure.  I discussed with the pt the benefit of lung re-expansion and an improved breathing status, once the PTC was placed. The patient said that S:I can't walk now I;m having some trouble breathing  O: Mild use of  accessory muscle to breathe       RR=26 On NC@3 liters 99%02Sat, , B/P       CXR -> Moderate size left apical PTX       Left Chest tube to -40cm suction    A: 46yo male s/p Flex bronch on 9/21: s/p L.Vats Left thoracotomy-DAVID; s/p re-op left thoracotomy evacuation of hemothorax    P: IR to place PTC to left apical PTX  The procedure was explained to the pt. the risk /benefit of the procedure were reviewed. I explained to the pt that bleeding can occur with this procedure, and since he is on eliquis the risk of bleeding is higher than if he were not taking an anticoagulant. I discussed with the pt the benefit of lung re-expansion and an improved breathing status, once the PTC was placed. The patient said that he understood that eliquis can increase bleeding S:I can't walk now I;m having some trouble breathing  O: Mild use of  accessory muscle to breathe       RR=26 On NC@3 liters 99%02Sat, , B/P       CXR -> Moderate size left apical PTX       Left Chest tube to -40cm suction    A: 46yo male s/p Flex bronch on 9/21: s/p L.Vats Left thoracotomy-DAVID; s/p re-op left thoracotomy evacuation of hemothorax    P: IR to place PTC to left apical PTX.  Dr Caldera asked me to explain to his Pt the risk + benefit of the placement of PTC to re-expand his lung.     The procedure was explained to the pt. the risk /benefit of the procedure were reviewed. I explained to the pt that bleeding can occur with this procedure, and since he is on eliquis the risk of bleeding is higher than if he were not taking an anticoagulant. I discussed with the pt the benefit of lung re-expansion and an improved breathing status, once the PTC was placed. The patient said that he understood that eliquis can cause bleeding, but "I can't continue to breathe this way, I can't walk." S:I can't walk now I;m having some trouble breathing  O: Mild use of  accessory muscle to breathe       RR=26 On NC@3 liters 99%02Sat, , B/P       CXR -> Moderate size left apical PTX       Left Chest tube to -40cm suction    A: 46yo male s/p Flex bronch on 9/21: s/p L.Vats Left thoracotomy-DAVID; s/p re-op left thoracotomy evacuation of hemothorax    P: IR to place PTC to left apical PTX.    Dr Caldera asked me to explain to his Pt the risk + benefit of the placement of PTC to re-expand his lung.     The procedure was explained to the pt. the risk /benefit of the procedure were reviewed. I explained to the pt that bleeding can occur with this procedure, and since he is on eliquis the risk of bleeding is higher than if he were not taking an anticoagulant. I discussed with the pt the benefit of lung re-expansion and an improved breathing status, once the PTC was placed. The patient said that he understood that eliquis can cause bleeding, but "I can't continue to breathe this way, I can't walk."

## 2017-10-06 NOTE — PROGRESS NOTE ADULT - PROBLEM SELECTOR PLAN 1
S/P surgery : reop twice secondary to hemothroax: curently on pressors and ventilator support: He is wheezing extensively: started on IV steroids 20 mg twice a day , but should be given only for few days : follow his wheezing daily as soon as it is resolved; dc steroids For now their is no hemoptysis: He had twice reop yesterday secondary to hemothorax and bleeding from chest tube: currently on pressors and seated and on broad spectrum antibiotics.  9/27: he seems to be doing better today: for extubation  9/28: extubated: alert and awake and responding: He is on zosyn any way with voriconazole!  9/29: chest x-ray today , to me looks better then yesterday: There is no official report yet:  10/02: doing ok  10/3: on eliquis: toelrating well: no hemoptysis  10/4 : stable  10/05: persistent left upper air space following DAVID lobectomy: defer to thoracic surgery  10/06: stable?: no HEMOPTYSIS

## 2017-10-06 NOTE — PROGRESS NOTE ADULT - SUBJECTIVE AND OBJECTIVE BOX
OSCAR CHACKO            MRN-[id number]         No Known Allergies             47 M PMH of sarcoidosis, HTN, COPD, and asthma who was initially admitted on 09/10 w cc of severe leg spasms X two days  In E.D. pt found to be wheezing, so he was seen by Pulmonary and started on IV Steroids for sarcoidosis  On 9/12 at 4 am, a RRT and Stroke codes were called as the patient became unresponsive.  He was transferred to MICU and then intubated for airway protection.   The patient was found to have a new infarct on MRI and also found to have a patent PFO on this admission as well. He was weaned off vent on 09/13 and transferred to the floors   There was concern patient was having paroxysmal afib, so he was started on Eliquis yesterday evening. He has received 2 doses of Eliquis on 09/19.   However, on 09/20, the patient had a coughing episode, which was not unusual for him, but during this episode he began to cough up fresh blood, about 1/2 cup of blood. He underwent Bronchoscopy with bronchoalveolar lavage of both sides and epi injection on 09/21 and was to have received further IR embolization for hemoptysis control in the next few days.  However, he developed hemoptysis again and underwent urgent LULectomy, complicated with post op bleeding.    Left VATS and DAVID lobectomy 9/24/17  Re-op L thoracotomy, evacuation of hemothorax (600cc clot, 400cc blood) no obvious site of bleeding  8/25/17  Re-op L thoracotomy, evacuation of hemothorax; RTOR x2 100cc clot; 200cc blood: bleeding posterior intercostal arteries identified and cauterized 9/25/17    Pt was transferred to ICU because of increased SOB on the floor. Pt also had increased left apical pneumo for which I.R put a pigtail catheter. Pt is currently feeling much better, on nasal canula.    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  COPD (chronic obstructive pulmonary disease)  Asthma  Hypertension  Sarcoidosis  No significant past surgical history        ICU Vital Signs Last 24 Hrs  T(C): 37.2 (06 Oct 2017 17:00), Max: 37.2 (06 Oct 2017 17:00)  T(F): 99 (06 Oct 2017 17:00), Max: 99 (06 Oct 2017 17:00)  HR: 91 (06 Oct 2017 20:00) (87 - 96)  BP: 97/66 (06 Oct 2017 20:00) (97/66 - 108/64)  BP(mean): 73 (06 Oct 2017 20:00) (73 - 73)  ABP: --  ABP(mean): --  RR: 19 (06 Oct 2017 20:00) (16 - 19)  SpO2: 97% (06 Oct 2017 20:00) (95% - 100%)    I&O's Summary    05 Oct 2017 07:01  -  06 Oct 2017 07:00  --------------------------------------------------------  IN: 360 mL / OUT: 1035 mL / NET: -675 mL      CAPILLARY BLOOD GLUCOSE          MEDICATIONS  (STANDING):  ALBUTerol    90 MICROgram(s) HFA Inhaler 2 Puff(s) Inhalation every 6 hours  apixaban 5 milliGRAM(s) Oral every 12 hours  aspirin  chewable 81 milliGRAM(s) Oral daily  atorvastatin 80 milliGRAM(s) Oral at bedtime  chlorhexidine 4% Liquid 1 Application(s) Topical daily  diVALproex  milliGRAM(s) Oral two times a day  docusate sodium 100 milliGRAM(s) Oral two times a day  ferrous    sulfate 325 milliGRAM(s) Oral every 8 hours  folic acid 1 milliGRAM(s) Oral daily  ipratropium 17 MICROgram(s) HFA Inhaler 1 Puff(s) Inhalation every 6 hours  methylPREDNISolone sodium succinate Injectable 40 milliGRAM(s) IV Push every 8 hours  pantoprazole    Tablet 40 milliGRAM(s) Oral two times a day before meals  polyethylene glycol 3350 17 Gram(s) Oral at bedtime  voriconazole 200 milliGRAM(s) Oral every 12 hours    MEDICATIONS  (PRN):  acetaminophen    Suspension 650 milliGRAM(s) Oral every 6 hours PRN For Temp greater than 38 C (100.4 F)  acetaminophen   Tablet. 650 milliGRAM(s) Oral every 4 hours PRN Mild Pain (1 - 3)  acetaminophen   Tablet. 650 milliGRAM(s) Oral every 6 hours PRN Mild Pain (1 - 3)  aluminum hydroxide/magnesium hydroxide/simethicone Suspension 30 milliLiter(s) Oral every 6 hours PRN Dyspepsia  HYDROmorphone  Injectable 1 milliGRAM(s) IV Push every 4 hours PRN breakthrough pain  oxyCODONE    IR 5 milliGRAM(s) Oral every 4 hours PRN Moderate Pain (4 - 6)  oxyCODONE    IR 10 milliGRAM(s) Oral every 4 hours PRN Severe Pain (7 - 10)  traMADol 25 milliGRAM(s) Oral every 4 hours PRN Moderate Pain (4 - 6)      Physical exam:   General:              Pt is awake, alert,                                                Neuro:                 focal,  weak   L>R  KAMLESH>LL                          Cardiovascular:    S1 & S2, regular                           Respiratory:         Air entry is decreased at left upper chest and has bilateral conducted sounds, rhonchi                           GI:                          Soft, nondistended and nontender, Bowel sounds active                            Ext:                        No cyanosis &  mild  bilateral  edema of upper ext                            Labs:                                                                           8.8    7.44  )-----------( 179      ( 06 Oct 2017 16:30 )             28.3             10-06    146<H>  |  102  |  13  ----------------------------<  89  4.4   |  35<H>  |  0.89    Ca    8.5      06 Oct 2017 16:30    TPro  5.6<L>  /  Alb  3.0<L>  /  TBili  0.3  /  DBili  x   /  AST  30  /  ALT  25  /  AlkPhos  46  10-05                  PT/INR - ( 06 Oct 2017 14:15 )   PT: 14.4 SEC;   INR: 1.28          PTT - ( 06 Oct 2017 14:15 )  PTT:28.5 SEC        CXR:     47 M  s/p Left VATS and DAVID lobectomy on 9/24/17, RTOR for bleeding twice.  Pt was transferred to ICU because of increased SOB on the floor. Pt also had increased left apical pneumo for which I.R put a pigtail catheter. Pt is currently feeling much better, on nasal canula.                             Neuro:                                         Pain control with Tylenol IV / PO    CVA - Continue PT. Physical medicine evaluation.                              Cardiovascular:                                             Continue hemodynamic monitoring.    PFO - On Eliquis 5mg/bid                            Respiratory:                                                                                                                                                                                                                                                                                                                                                                                                                                                                                                                                                                                                                                                                                                                                                                                                                                                                                                      Comfortable, not in any distress.                                         Monitor chest tube output.                                         Pneumothorax: Has new pigtail cathetr & Chest tube. Both tubes  to suction, -20cm. Small air leak  from pigtail                                                              Sarcoidosis: Steroids tapered off yesterday. Since pt was in respiratory distress requiring 100% Fio2, Solumedrol was restarted. D/W Pulmonary regarding continuing steroids.                          GI                                         Tolerating PO                                         Continue GI prophylaxis with Protonix                                         Continue Zofran / Reglan for nausea - PRN	                                                                 Renal:                                                                                  Monitor I/Os and electrolytes                                                 Hem/ Onc:                                                                                  Monitor chest tube output     Anemia: Continue Iron / FA                            Infectious disease:    Aspergilloma - On voriconazole 200 milliGRAM(s) Oral every 12 hours.     Monitor for fever / leukocytosis.                                         All surgical incision / chest tube  sites look clean                            Endocrine                                          Continue Accu-Checks with coverage    All available pertinent clinical, laboratory, radiographic, hemodynamic, echocardiographic, respiratory data, microbiologic data and chart were reviewed and analyzed   Patient seen, examined and plan discussed with CT Surgeon Dr. Guerra / CTICU team..      I have spent 45 minutes of critical care time with this patient between 5pm and 11.59pm.      Len Kim MD OSCAR CHACKO            MRN-[id number]         No Known Allergies             47 M PMH of sarcoidosis, HTN, COPD, and asthma who was initially admitted on 09/10 w cc of severe leg spasms X two days  In E.D. pt found to be wheezing, so he was seen by Pulmonary and started on IV Steroids for sarcoidosis  On 9/12 at 4 am, a RRT and Stroke codes were called as the patient became unresponsive.  He was transferred to MICU and then intubated for airway protection.   The patient was found to have a new infarct on MRI and also found to have a patent PFO on this admission as well. He was weaned off vent on 09/13 and transferred to the floors   There was concern patient was having paroxysmal afib, so he was started on Eliquis yesterday evening. He has received 2 doses of Eliquis on 09/19.   However, on 09/20, the patient had a coughing episode, which was not unusual for him, but during this episode he began to cough up fresh blood, about 1/2 cup of blood. He underwent Bronchoscopy with bronchoalveolar lavage of both sides and epi injection on 09/21 and was to have received further IR embolization for hemoptysis control in the next few days.  However, he developed hemoptysis again and underwent urgent LULectomy, complicated with post op bleeding.    Left VATS and DAVID lobectomy 9/24/17  Re-op L thoracotomy, evacuation of hemothorax (600cc clot, 400cc blood) no obvious site of bleeding  8/25/17  Re-op L thoracotomy, evacuation of hemothorax; RTOR x2 100cc clot; 200cc blood: bleeding posterior intercostal arteries identified and cauterized 9/25/17    Pt was transferred to ICU because of increased SOB on the floor. Pt also had increased left apical pneumo for which I.R put a pigtail catheter. 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  COPD (chronic obstructive pulmonary disease)  Asthma  Hypertension  Sarcoidosis  No significant past surgical history        ICU Vital Signs Last 24 Hrs  T(C): 37.2 (06 Oct 2017 17:00), Max: 37.2 (06 Oct 2017 17:00)  T(F): 99 (06 Oct 2017 17:00), Max: 99 (06 Oct 2017 17:00)  HR: 91 (06 Oct 2017 20:00) (87 - 96)  BP: 97/66 (06 Oct 2017 20:00) (97/66 - 108/64)  BP(mean): 73 (06 Oct 2017 20:00) (73 - 73)  ABP: --  ABP(mean): --  RR: 19 (06 Oct 2017 20:00) (16 - 19)  SpO2: 97% (06 Oct 2017 20:00) (95% - 100%)    I&O's Summary    05 Oct 2017 07:01  -  06 Oct 2017 07:00  --------------------------------------------------------  IN: 360 mL / OUT: 1035 mL / NET: -675 mL      CAPILLARY BLOOD GLUCOSE          MEDICATIONS  (STANDING):  ALBUTerol    90 MICROgram(s) HFA Inhaler 2 Puff(s) Inhalation every 6 hours  apixaban 5 milliGRAM(s) Oral every 12 hours  aspirin  chewable 81 milliGRAM(s) Oral daily  atorvastatin 80 milliGRAM(s) Oral at bedtime  chlorhexidine 4% Liquid 1 Application(s) Topical daily  diVALproex  milliGRAM(s) Oral two times a day  docusate sodium 100 milliGRAM(s) Oral two times a day  ferrous    sulfate 325 milliGRAM(s) Oral every 8 hours  folic acid 1 milliGRAM(s) Oral daily  ipratropium 17 MICROgram(s) HFA Inhaler 1 Puff(s) Inhalation every 6 hours  methylPREDNISolone sodium succinate Injectable 40 milliGRAM(s) IV Push every 8 hours  pantoprazole    Tablet 40 milliGRAM(s) Oral two times a day before meals  polyethylene glycol 3350 17 Gram(s) Oral at bedtime  voriconazole 200 milliGRAM(s) Oral every 12 hours    MEDICATIONS  (PRN):  acetaminophen    Suspension 650 milliGRAM(s) Oral every 6 hours PRN For Temp greater than 38 C (100.4 F)  acetaminophen   Tablet. 650 milliGRAM(s) Oral every 4 hours PRN Mild Pain (1 - 3)  acetaminophen   Tablet. 650 milliGRAM(s) Oral every 6 hours PRN Mild Pain (1 - 3)  aluminum hydroxide/magnesium hydroxide/simethicone Suspension 30 milliLiter(s) Oral every 6 hours PRN Dyspepsia  HYDROmorphone  Injectable 1 milliGRAM(s) IV Push every 4 hours PRN breakthrough pain  oxyCODONE    IR 5 milliGRAM(s) Oral every 4 hours PRN Moderate Pain (4 - 6)  oxyCODONE    IR 10 milliGRAM(s) Oral every 4 hours PRN Severe Pain (7 - 10)  traMADol 25 milliGRAM(s) Oral every 4 hours PRN Moderate Pain (4 - 6)      Physical exam:   General:              Pt is awake, alert,                                                Neuro:                 focal,  weak   L>R  KAMLESH>LL                          Cardiovascular:    S1 & S2, regular                           Respiratory:         Air entry is decreased at left upper chest and has bilateral conducted sounds, rhonchi                           GI:                          Soft, nondistended and nontender, Bowel sounds active                            Ext:                        No cyanosis &  mild  bilateral  edema of upper ext                            Labs:                                                                           8.8    7.44  )-----------( 179      ( 06 Oct 2017 16:30 )             28.3             10-06    146<H>  |  102  |  13  ----------------------------<  89  4.4   |  35<H>  |  0.89    Ca    8.5      06 Oct 2017 16:30    TPro  5.6<L>  /  Alb  3.0<L>  /  TBili  0.3  /  DBili  x   /  AST  30  /  ALT  25  /  AlkPhos  46  10-05                  PT/INR - ( 06 Oct 2017 14:15 )   PT: 14.4 SEC;   INR: 1.28          PTT - ( 06 Oct 2017 14:15 )  PTT:28.5 SEC        CXR:     47 M  s/p Left VATS and DAVID lobectomy on 9/24/17, RTOR for bleeding twice.  Pt was transferred to ICU because of increased SOB on the floor. Pt also had increased left apical pneumo for which I.R put a pigtail catheter. Pt is currently feeling much better, on nasal canula.                             Neuro:                                         Pain control with Tylenol IV / PO    CVA - Continue PT. Physical medicine evaluation.                              Cardiovascular:                                             Continue hemodynamic monitoring.    PFO - On Eliquis 5mg/bid                            Respiratory:                                                                                                                                                                                                                                                                                                                                                                                                                                                                                                                                                                                                                                                                                                                                                                                                                                                                                                      Comfortable, not in any distress.                                         Monitor chest tube output.                                         Pneumothorax: Has new pigtail cathetr & Chest tube. Both tubes  to suction, -20cm. Small air leak  from pigtail                                                              Sarcoidosis: Steroids tapered off yesterday. Since pt was in respiratory distress requiring 100% Fio2, Hydrocortisone IV was given.   D/W Pulmonary regarding continuing steroids.                          GI                                         Tolerating PO                                         Continue GI prophylaxis with Protonix                                         Continue Zofran / Reglan for nausea - PRN	                                                                 Renal:                                                                                  Monitor I/Os and electrolytes                                                 Hem/ Onc:                                                                                  Monitor chest tube output     Anemia: Continue Iron / FA                            Infectious disease:    Aspergilloma - On voriconazole 200 milliGRAM(s) Oral every 12 hours.     Monitor for fever / leukocytosis.                                         All surgical incision / chest tube  sites look clean                            Endocrine                                          Continue Accu-Checks with coverage    All available pertinent clinical, laboratory, radiographic, hemodynamic, echocardiographic, respiratory data, microbiologic data and chart were reviewed and analyzed   Patient seen, examined and plan discussed with CT Surgeon Dr. Guerra / CTICU team..      I have spent 45 minutes of critical care time with this patient between 5pm and 11.59pm.      Len Kim MD

## 2017-10-06 NOTE — PROGRESS NOTE ADULT - PROBLEM SELECTOR PLAN 6
on steroids at this time: it was given only for a short duration: thinking his wheezing is secondary to rhinovirus infection: It will cont till tomorrow: thereafter would taper it down to off in next 2-3  10/1: change prednisone to 10 mg daily  10/02: cont prednisone  10/03: trop prednisone after today's dose  10/4 stop prednisone tomorrow  10/5: dc prednisone today : pt was not on any treatment for sarcoidosis before  10/06: OFF PREDNISONE NOW

## 2017-10-06 NOTE — PROGRESS NOTE ADULT - PROBLEM SELECTOR PLAN 3
wheezing is present :pk pressures in 30's : started him on steroids again  bronchodilators ATC:  9/27; if he continues to show improvement: can decrease his solumedrol tomorrow  9/28: decreases his IV steroids to 20 mg once a day for a few days more  9/29: on 20 mg per day of solumedrol: would continue for few more days and then decrease it  9/30: doing ok: - wheezing: decrease his steroids to 10 mg a day from tomorrow  10/1: change to po prednisone 10 mg  10/02; cont po prednisone  10/3: stop prednisone after today,s dose?  10/4 : dc prednisone after tomorrow dose  10/5: DC prednisone today  10/6: OF PREDNISONE NOW

## 2017-10-07 LAB
BUN SERPL-MCNC: 17 MG/DL — SIGNIFICANT CHANGE UP (ref 7–23)
CA-I BLD-SCNC: 1.06 MMOL/L — SIGNIFICANT CHANGE UP (ref 1.03–1.23)
CALCIUM SERPL-MCNC: 8.4 MG/DL — SIGNIFICANT CHANGE UP (ref 8.4–10.5)
CHLORIDE SERPL-SCNC: 100 MMOL/L — SIGNIFICANT CHANGE UP (ref 98–107)
CO2 SERPL-SCNC: 33 MMOL/L — HIGH (ref 22–31)
CREAT SERPL-MCNC: 0.94 MG/DL — SIGNIFICANT CHANGE UP (ref 0.5–1.3)
GLUCOSE SERPL-MCNC: 108 MG/DL — HIGH (ref 70–99)
HCT VFR BLD CALC: 27.1 % — LOW (ref 39–50)
HGB BLD-MCNC: 8.5 G/DL — LOW (ref 13–17)
MAGNESIUM SERPL-MCNC: 2 MG/DL — SIGNIFICANT CHANGE UP (ref 1.6–2.6)
MCHC RBC-ENTMCNC: 30.4 PG — SIGNIFICANT CHANGE UP (ref 27–34)
MCHC RBC-ENTMCNC: 31.4 % — LOW (ref 32–36)
MCV RBC AUTO: 96.8 FL — SIGNIFICANT CHANGE UP (ref 80–100)
NRBC # FLD: 0 — SIGNIFICANT CHANGE UP
PHOSPHATE SERPL-MCNC: 3.2 MG/DL — SIGNIFICANT CHANGE UP (ref 2.5–4.5)
PLATELET # BLD AUTO: 183 K/UL — SIGNIFICANT CHANGE UP (ref 150–400)
PMV BLD: 13.3 FL — HIGH (ref 7–13)
POTASSIUM SERPL-MCNC: 5.1 MMOL/L — SIGNIFICANT CHANGE UP (ref 3.5–5.3)
POTASSIUM SERPL-SCNC: 5.1 MMOL/L — SIGNIFICANT CHANGE UP (ref 3.5–5.3)
RBC # BLD: 2.8 M/UL — LOW (ref 4.2–5.8)
RBC # FLD: 16.2 % — HIGH (ref 10.3–14.5)
SODIUM SERPL-SCNC: 143 MMOL/L — SIGNIFICANT CHANGE UP (ref 135–145)
WBC # BLD: 6.61 K/UL — SIGNIFICANT CHANGE UP (ref 3.8–10.5)
WBC # FLD AUTO: 6.61 K/UL — SIGNIFICANT CHANGE UP (ref 3.8–10.5)

## 2017-10-07 PROCEDURE — 99233 SBSQ HOSP IP/OBS HIGH 50: CPT

## 2017-10-07 PROCEDURE — 71010: CPT | Mod: 26

## 2017-10-07 RX ORDER — ACETAMINOPHEN 500 MG
1000 TABLET ORAL ONCE
Qty: 0 | Refills: 0 | Status: COMPLETED | OUTPATIENT
Start: 2017-10-07 | End: 2017-10-08

## 2017-10-07 RX ORDER — CHLORHEXIDINE GLUCONATE 213 G/1000ML
1 SOLUTION TOPICAL DAILY
Qty: 0 | Refills: 0 | Status: DISCONTINUED | OUTPATIENT
Start: 2017-10-07 | End: 2017-10-13

## 2017-10-07 RX ORDER — VORICONAZOLE 10 MG/ML
200 INJECTION, POWDER, LYOPHILIZED, FOR SOLUTION INTRAVENOUS EVERY 12 HOURS
Qty: 0 | Refills: 0 | Status: DISCONTINUED | OUTPATIENT
Start: 2017-10-07 | End: 2017-11-02

## 2017-10-07 RX ORDER — ALPRAZOLAM 0.25 MG
0.25 TABLET ORAL AT BEDTIME
Qty: 0 | Refills: 0 | Status: DISCONTINUED | OUTPATIENT
Start: 2017-10-07 | End: 2017-10-07

## 2017-10-07 RX ORDER — ALPRAZOLAM 0.25 MG
0.25 TABLET ORAL ONCE
Qty: 0 | Refills: 0 | Status: DISCONTINUED | OUTPATIENT
Start: 2017-10-07 | End: 2017-10-07

## 2017-10-07 RX ORDER — APIXABAN 2.5 MG/1
5 TABLET, FILM COATED ORAL EVERY 12 HOURS
Qty: 0 | Refills: 0 | Status: DISCONTINUED | OUTPATIENT
Start: 2017-10-07 | End: 2017-11-02

## 2017-10-07 RX ADMIN — TRAMADOL HYDROCHLORIDE 25 MILLIGRAM(S): 50 TABLET ORAL at 06:58

## 2017-10-07 RX ADMIN — APIXABAN 5 MILLIGRAM(S): 2.5 TABLET, FILM COATED ORAL at 09:19

## 2017-10-07 RX ADMIN — Medication 1 MILLIGRAM(S): at 13:29

## 2017-10-07 RX ADMIN — Medication 325 MILLIGRAM(S): at 13:29

## 2017-10-07 RX ADMIN — HYDROMORPHONE HYDROCHLORIDE 1 MILLIGRAM(S): 2 INJECTION INTRAMUSCULAR; INTRAVENOUS; SUBCUTANEOUS at 14:30

## 2017-10-07 RX ADMIN — Medication 325 MILLIGRAM(S): at 21:09

## 2017-10-07 RX ADMIN — Medication 1 PUFF(S): at 16:44

## 2017-10-07 RX ADMIN — HYDROMORPHONE HYDROCHLORIDE 1 MILLIGRAM(S): 2 INJECTION INTRAMUSCULAR; INTRAVENOUS; SUBCUTANEOUS at 14:45

## 2017-10-07 RX ADMIN — Medication 1 PUFF(S): at 21:41

## 2017-10-07 RX ADMIN — VORICONAZOLE 200 MILLIGRAM(S): 10 INJECTION, POWDER, LYOPHILIZED, FOR SOLUTION INTRAVENOUS at 21:09

## 2017-10-07 RX ADMIN — Medication 1 PUFF(S): at 10:39

## 2017-10-07 RX ADMIN — PANTOPRAZOLE SODIUM 40 MILLIGRAM(S): 20 TABLET, DELAYED RELEASE ORAL at 06:58

## 2017-10-07 RX ADMIN — VORICONAZOLE 200 MILLIGRAM(S): 10 INJECTION, POWDER, LYOPHILIZED, FOR SOLUTION INTRAVENOUS at 09:19

## 2017-10-07 RX ADMIN — Medication 325 MILLIGRAM(S): at 06:59

## 2017-10-07 RX ADMIN — ATORVASTATIN CALCIUM 80 MILLIGRAM(S): 80 TABLET, FILM COATED ORAL at 21:08

## 2017-10-07 RX ADMIN — ALBUTEROL 2 PUFF(S): 90 AEROSOL, METERED ORAL at 21:42

## 2017-10-07 RX ADMIN — DIVALPROEX SODIUM 500 MILLIGRAM(S): 500 TABLET, DELAYED RELEASE ORAL at 09:19

## 2017-10-07 RX ADMIN — Medication 81 MILLIGRAM(S): at 13:28

## 2017-10-07 RX ADMIN — CHLORHEXIDINE GLUCONATE 1 APPLICATION(S): 213 SOLUTION TOPICAL at 06:28

## 2017-10-07 RX ADMIN — ALBUTEROL 2 PUFF(S): 90 AEROSOL, METERED ORAL at 10:38

## 2017-10-07 RX ADMIN — Medication 100 MILLIGRAM(S): at 06:59

## 2017-10-07 RX ADMIN — PANTOPRAZOLE SODIUM 40 MILLIGRAM(S): 20 TABLET, DELAYED RELEASE ORAL at 16:33

## 2017-10-07 RX ADMIN — Medication 100 MILLIGRAM(S): at 17:16

## 2017-10-07 RX ADMIN — DIVALPROEX SODIUM 500 MILLIGRAM(S): 500 TABLET, DELAYED RELEASE ORAL at 21:09

## 2017-10-07 RX ADMIN — APIXABAN 5 MILLIGRAM(S): 2.5 TABLET, FILM COATED ORAL at 21:09

## 2017-10-07 RX ADMIN — ALBUTEROL 2 PUFF(S): 90 AEROSOL, METERED ORAL at 03:52

## 2017-10-07 RX ADMIN — Medication 20 MILLIGRAM(S): at 14:41

## 2017-10-07 RX ADMIN — ALBUTEROL 2 PUFF(S): 90 AEROSOL, METERED ORAL at 16:44

## 2017-10-07 RX ADMIN — Medication 0.25 MILLIGRAM(S): at 21:09

## 2017-10-07 RX ADMIN — Medication 1 PUFF(S): at 03:52

## 2017-10-07 NOTE — PROGRESS NOTE ADULT - PROBLEM SELECTOR PLAN 3
wheezing is present :pk pressures in 30's : started him on steroids again  bronchodilators ATC:  9/27; if he continues to show improvement: can decrease his solumedrol tomorrow  9/28: decreases his IV steroids to 20 mg once a day for a few days more  9/29: on 20 mg per day of solumedrol: would continue for few more days and then decrease it  9/30: doing ok: - wheezing: decrease his steroids to 10 mg a day from tomorrow  10/1: change to po prednisone 10 mg  10/02; cont po prednisone  10/3: stop prednisone after today,s dose?  10/4 : dc prednisone after tomorrow dose  10/5: DC prednisone today  10/6: OF PREDNISONE NOW  10/07: has sarcoidosis too: SOB with poor resp excursion: would start him on 20 mg of prednisone

## 2017-10-07 NOTE — PROGRESS NOTE ADULT - PROBLEM SELECTOR PLAN 1
S/P surgery : reop twice secondary to hemothroax: curently on pressors and ventilator support: He is wheezing extensively: started on IV steroids 20 mg twice a day , but should be given only for few days : follow his wheezing daily as soon as it is resolved; dc steroids For now their is no hemoptysis: He had twice reop yesterday secondary to hemothorax and bleeding from chest tube: currently on pressors and seated and on broad spectrum antibiotics.  9/27: he seems to be doing better today: for extubation  9/28: extubated: alert and awake and responding: He is on zosyn any way with voriconazole!  9/29: chest x-ray today , to me looks better then yesterday: There is no official report yet:  10/02: doing ok  10/3: on eliquis: toelrating well: no hemoptysis  10/4 : stable  10/05: persistent left upper air space following DAVID lobectomy: defer to thoracic surgery  10/06: stable?: no HEMOPTYSIS  10/7: resolved

## 2017-10-07 NOTE — PROGRESS NOTE ADULT - SUBJECTIVE AND OBJECTIVE BOX
47 M PMH of sarcoidosis, HTN, COPD, and asthma who was initially admitted on 09/10 w cc of severe leg spasms X two days  In E.D. pt found to be wheezing, so he was seen by Pulmonary and started on IV Steroids for sarcoidosis  On 9/12 at 4 am, a RRT and Stroke codes were called as the patient became unresponsive.  He was transferred to MICU and then intubated for airway protection.   The patient was found to have a new infarct on MRI and also found to have a patent PFO on this admission as well. He was weaned off vent on 09/13 and transferred to the floors   There was concern patient was having paroxysmal afib, so he was started on Eliquis yesterday evening. He has received 2 doses of Eliquis on 09/19.   However, on 09/20, the patient had a coughing episode, which was not unusual for him, but during this episode he began to cough up fresh blood, about 1/2 cup of blood. He underwent Bronchoscopy with bronchoalveolar lavage of both sides and epi injection on 09/21 and was to have received further IR embolization for hemoptysis control in the next few days.  However, he developed hemoptysis again and underwent urgent LULectomy, complicated with post op bleeding.    Left VATS and DAVID lobectomy 9/24/17 [POD # 13]  Re-op L thoracotomy, evacuation of hemothorax (600cc clot, 400cc blood) no obvious site of bleeding  9/25/17 [POD # 12]  Re-op L thoracotomy, evacuation of hemothorax; RTOR x2 100cc clot; 200cc blood: bleeding posterior intercostal arteries identified and cauterized 9/25/17 [POD # 12]  Pt was transferred to ICU because of increased SOB on the floor. Pt also had increased left apical pneumo for which I.R put a   Pigtail catheter. [POD # 1 ([id number])] (Transferred back to ICU 2/2 increased SOB on telemetry and increased left apical ptx)  Pt is currently feeling much better, on nasal canula saturating 100%    Issues:                 L PTX              Aspergilloma on Voriconazole              Sarcoidosis               PFO on Eliquis              Anemia  CVA  with L-side weakness              Pneumothorax and Air leak              S/p post op bleeding              S/P Pulm edema, possible TRALI              s/p massive transfusion       Home Medications:   * Incomplete Medication History as of 11-Sep-2017 09:23 documented in Structured Notes  · 	Azithromycin 5 Day Dose Pack 250 mg oral tablet: 1 dose(s) orally once a day for respiratory infection, Last Dose Taken:    · 	predniSONE 50 mg oral tablet: 1 tab(s) orally once a day , Last Dose Taken:    · 	albuterol 1.25 mg/3 mL (0.042%) inhalation solution: 3 milliliter(s) inhaled 3 times a day, Last Dose Taken:    · 	losartan 25 mg oral tablet: 1 tab(s) orally 2 times a day, Last Dose Taken:    · 	Symbicort 160 mcg-4.5 mcg/inh inhalation aerosol: 2 puff(s) inhaled 2 times a day, Last Dose Taken:      Home Medications:  Incruse Ellipta: 1 puff(s) inhaled once a day (18 Sep 2017 14:07)  losartan 25 mg oral tablet: 2 tab(s) orally once a day (18 Sep 2017 14:07)  Symbicort 160 mcg-4.5 mcg/inh inhalation aerosol: 2 puff(s) inhaled 2 times a day (18 Sep 2017 14:07)    MEDICATIONS  (STANDING):  ALBUTerol    90 MICROgram(s) HFA Inhaler 2 Puff(s) Inhalation every 6 hours  apixaban 5 milliGRAM(s) Oral every 12 hours  aspirin  chewable 81 milliGRAM(s) Oral daily  atorvastatin 80 milliGRAM(s) Oral at bedtime  chlorhexidine 4% Liquid 1 Application(s) Topical daily  diVALproex  milliGRAM(s) Oral two times a day  docusate sodium 100 milliGRAM(s) Oral two times a day  ferrous    sulfate 325 milliGRAM(s) Oral every 8 hours  folic acid 1 milliGRAM(s) Oral daily  ipratropium 17 MICROgram(s) HFA Inhaler 1 Puff(s) Inhalation every 6 hours  pantoprazole    Tablet 40 milliGRAM(s) Oral two times a day before meals  polyethylene glycol 3350 17 Gram(s) Oral at bedtime  voriconazole 200 milliGRAM(s) Oral every 12 hours    MEDICATIONS  (PRN):  acetaminophen    Suspension 650 milliGRAM(s) Oral every 6 hours PRN For Temp greater than 38 C (100.4 F)  acetaminophen   Tablet. 650 milliGRAM(s) Oral every 4 hours PRN Mild Pain (1 - 3)  acetaminophen   Tablet. 650 milliGRAM(s) Oral every 6 hours PRN Mild Pain (1 - 3)  aluminum hydroxide/magnesium hydroxide/simethicone Suspension 30 milliLiter(s) Oral every 6 hours PRN Dyspepsia  HYDROmorphone  Injectable 1 milliGRAM(s) IV Push every 4 hours PRN breakthrough pain  traMADol 25 milliGRAM(s) Oral every 4 hours PRN Moderate Pain (4 - 6)    ICU Vital Signs Last 24 Hrs  T(C): 36.8 (07 Oct 2017 08:00), Max: 37.3 (06 Oct 2017 20:00)  T(F): 98.2 (07 Oct 2017 08:00), Max: 99.1 (06 Oct 2017 20:00)  HR: 87 (07 Oct 2017 10:42) (72 - 102)  BP: 102/54 (07 Oct 2017 09:00) (89/64 - 112/71)  BP(mean): 63 (07 Oct 2017 09:00) (63 - 80)  RR: 16 (07 Oct 2017 09:00) (13 - 20)  SpO2: 100% (07 Oct 2017 10:42) (97% - 100%)    Physical exam:                           General:               Awake, alert,                                                Neuro:                 focal,  weak   L>R  KAMLESH>LL                          Cardiovascular:    S1 & S2, regular                           Respiratory:         Air entry is decreased at left upper chest and has bilateral conducted sounds, rhonchi                           GI:                       Soft, nondistended and nontender, Bowel sounds active                            Ext:                      No cyanosis &  mild  bilateral  edema of upper ext                                I&O's Summary    06 Oct 2017 07:01  -  07 Oct 2017 07:00  --------------------------------------------------------  IN: 350 mL / OUT: 1020 mL / NET: -670 mL    07 Oct 2017 07:01  -  07 Oct 2017 10:52  --------------------------------------------------------  IN: 120 mL / OUT: 35 mL / NET: 85 mL    Labs:                                                                       8.5    6.61  )-----------( 183      ( 07 Oct 2017 04:36 )             27.1                            10-07  143  |  100  |  17  ----------------------------<  108<H>  5.1   |  33<H>  |  0.94    Ca    8.4      07 Oct 2017 04:36  Phos  3.2     10-07  Mg     2.0     10-07    CAPILLARY BLOOD GLUCOSE  143 (06 Oct 2017 22:00)                          PT/INR - ( 06 Oct 2017 14:15 )   PT: 14.4 SEC;   INR: 1.28     PTT - ( 06 Oct 2017 14:15 )  PTT:28.5 SEC          ABG - ( 06 Oct 2017 16:30 )  pH: 7.41  /  pCO2: 59    /  pO2: 225   / HCO3: 35    / Base Excess: 11.7  /  SaO2: 100         Plan:  47 M PMH of sarcoidosis, HTN, COPD, and asthma who was initially admitted on 09/10 w cc of severe leg spasms X two days  In E.D. pt found to be wheezing, so he was seen by Pulmonary and started on IV Steroids for sarcoidosis  On 9/12 at 4 am, a RRT and Stroke codes were called as the patient became unresponsive.  He was transferred to MICU and then intubated for airway protection.   The patient was found to have a new infarct on MRI and also found to have a patent PFO on this admission as well. He was weaned off vent on 09/13 and transferred to the floors   There was concern patient was having paroxysmal afib, so he was started on Eliquis yesterday evening. He has received 2 doses of Eliquis on 09/19.   However, on 09/20, the patient had a coughing episode, which was not unusual for him, but during this episode he began to cough up fresh blood, about 1/2 cup of blood. He underwent Bronchoscopy with bronchoalveolar lavage of both sides and epi injection on 09/21 and was to have received further IR embolization for hemoptysis control in the next few days.  However, he developed hemoptysis again and underwent urgent LULectomy, complicated with post op bleeding.  Left VATS and DAVID lobectomy 9/24/17 [POD # 13]  Re-op L thoracotomy, evacuation of hemothorax (600cc clot, 400cc blood) no obvious site of bleeding  9/25/17 [POD # 12]  Re-op L thoracotomy, evacuation of hemothorax; RTOR x2 100cc clot; 200cc blood: bleeding posterior intercostal arteries identified and cauterized 9/25/17 [POD # 12]  Pt was transferred to ICU because of increased SOB on the floor. Pt also had increased left apical pneumo for which I.R put a   Pigtail catheter. 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 Physical medicine evaluation.                              Cardiovascular:                                             Continue hemodynamic monitoring.    PFO - On Eliquis 5mg/bid                            Respiratory:                                                                                                                                                                                                                                                                                                                                                                                                                                                                                                                                                                                                                                                                                                                                                                                                                                                                                                      Comfortable, not in any distress.                                         Monitor chest tube output.                                         Pneumothorax with a new L pigtail cathetr & Chest tube. Both tubes  to suction, -20cm. Small air leak  from pigtail                                                              Sarcoidosis: Steroids tapered off.                                          The patient was given Hydrocortisone X1 while in respiratory distress requiring 100% Fio2, but not continu                                          Will resume steroids only if requested by Pulmonary                             GI                                         Tolerating PO                                         Continue GI prophylaxis with Protonix                                         Continue Zofran / Reglan for nausea - PRN	                                                                 Renal:                                                                                  Monitor I/Os and electrolytes                                                 Hem/ Onc:                                                                                  Monitor chest tube output     Anemia: Continue Iron / FA                            Infectious disease:    Aspergilloma - On voriconazole 200 milliGRAM(s) Oral every 12 hours.     Monitor for fever / leukocytosis.                                         All surgical incision / chest tube  sites look clean                            Endocrine                                          Continue Accu-Checks with coverage    All available pertinent clinical, laboratory, radiographic, hemodynamic, echocardiographic, respiratory data, microbiologic data and chart were reviewed and analyzed   Patient seen, examined and plan discussed with CT Surgeon Dr. Guerra / CTICU team..    Prosper Wen MD

## 2017-10-07 NOTE — PROGRESS NOTE ADULT - PROBLEM SELECTOR PLAN 6
on steroids at this time: it was given only for a short duration: thinking his wheezing is secondary to rhinovirus infection: It will cont till tomorrow: thereafter would taper it down to off in next 2-3  10/1: change prednisone to 10 mg daily  10/02: cont prednisone  10/03: trop prednisone after today's dose  10/4 stop prednisone tomorrow  10/5: dc prednisone today : pt was not on any treatment for sarcoidosis before  10/06: OFF PREDNISONE NOW  10/7: start prednisone 20 mg per day

## 2017-10-07 NOTE — PROGRESS NOTE ADULT - SUBJECTIVE AND OBJECTIVE BOX
Patient is a 47y old  Male who presents with a chief complaint of "My leg wouldn't stop shaking." (14 Sep 2017 09:59)  events noted:  got more SOB on floor: transferred to SICU  Had chest tube placed: feels little better: still with MEEYR     Any change in ROS:     MEDICATIONS  (STANDING):  ALBUTerol    90 MICROgram(s) HFA Inhaler 2 Puff(s) Inhalation every 6 hours  apixaban 5 milliGRAM(s) Oral every 12 hours  aspirin  chewable 81 milliGRAM(s) Oral daily  atorvastatin 80 milliGRAM(s) Oral at bedtime  chlorhexidine 4% Liquid 1 Application(s) Topical daily  diVALproex  milliGRAM(s) Oral two times a day  docusate sodium 100 milliGRAM(s) Oral two times a day  ferrous    sulfate 325 milliGRAM(s) Oral every 8 hours  folic acid 1 milliGRAM(s) Oral daily  ipratropium 17 MICROgram(s) HFA Inhaler 1 Puff(s) Inhalation every 6 hours  pantoprazole    Tablet 40 milliGRAM(s) Oral two times a day before meals  polyethylene glycol 3350 17 Gram(s) Oral at bedtime  predniSONE   Tablet 20 milliGRAM(s) Oral daily  voriconazole 200 milliGRAM(s) Oral every 12 hours    MEDICATIONS  (PRN):  acetaminophen    Suspension 650 milliGRAM(s) Oral every 6 hours PRN For Temp greater than 38 C (100.4 F)  acetaminophen   Tablet. 650 milliGRAM(s) Oral every 4 hours PRN Mild Pain (1 - 3)  acetaminophen   Tablet. 650 milliGRAM(s) Oral every 6 hours PRN Mild Pain (1 - 3)  aluminum hydroxide/magnesium hydroxide/simethicone Suspension 30 milliLiter(s) Oral every 6 hours PRN Dyspepsia  HYDROmorphone  Injectable 1 milliGRAM(s) IV Push every 4 hours PRN breakthrough pain  traMADol 25 milliGRAM(s) Oral every 4 hours PRN Moderate Pain (4 - 6)    Vital Signs Last 24 Hrs  T(C): 37 (07 Oct 2017 12:00), Max: 37.3 (06 Oct 2017 20:00)  T(F): 98.6 (07 Oct 2017 12:00), Max: 99.1 (06 Oct 2017 20:00)  HR: 88 (07 Oct 2017 13:00) (72 - 102)  BP: 102/62 (07 Oct 2017 13:00) (89/64 - 112/71)  BP(mean): 71 (07 Oct 2017 13:00) (63 - 80)  RR: 15 (07 Oct 2017 13:00) (13 - 20)  SpO2: 100% (07 Oct 2017 13:00) (97% - 100%)    I&O's Summary    06 Oct 2017 07:01  -  07 Oct 2017 07:00  --------------------------------------------------------  IN: 350 mL / OUT: 1020 mL / NET: -670 mL    07 Oct 2017 07:01  -  07 Oct 2017 13:51  --------------------------------------------------------  IN: 360 mL / OUT: 640 mL / NET: -280 mL          Physical Exam:   GENERAL: NAD, well-groomed, well-developed  HEENT: GIOVANI/   Atraumatic, Normocephalic  ENMT: No tonsillar erythema, exudates, or enlargement; Moist mucous membranes, Good dentition, No lesions  NECK: Supple, No JVD, Normal thyroid  CHEST/LUNG: poor resp excursion: with coarse rhonchi   CVS: Regular rate and rhythm; No murmurs, rubs, or gallops  GI: : Soft, Nontender, Nondistended; Bowel sounds present  NERVOUS SYSTEM:  Alert & Oriented X3  EXTREMITIES:  2+ Peripheral Pulses, No clubbing, cyanosis, or edema  LYMPH: No lymphadenopathy noted  SKIN: No rashes or lesions  ENDOCRINOLOGY: No Thyromegaly  PSYCH: Appropriate    Labs:  ABG - ( 06 Oct 2017 16:30 )  pH: 7.41  /  pCO2: 59    /  pO2: 225   / HCO3: 35    / Base Excess: 11.7  /  SaO2: 100                           8.5    6.61  )-----------( 183      ( 07 Oct 2017 04:36 )             27.1                         8.8    7.44  )-----------( 179      ( 06 Oct 2017 16:30 )             28.3                         9.0    8.47  )-----------( 176      ( 05 Oct 2017 06:32 )             28.6                         8.7    11.31 )-----------( 194      ( 04 Oct 2017 02:50 )             27.5     10-07    143  |  100  |  17  ----------------------------<  108<H>  5.1   |  33<H>  |  0.94  10-06    146<H>  |  102  |  13  ----------------------------<  89  4.4   |  35<H>  |  0.89  10-05    143  |  102  |  15  ----------------------------<  86  3.9   |  36<H>  |  0.92  10-04    143  |  98  |  15  ----------------------------<  107<H>  3.9   |  34<H>  |  0.88    Ca    8.4      07 Oct 2017 04:36  Ca    8.5      06 Oct 2017 16:30  Phos  3.2     10-07  Mg     2.0     10-07    TPro  5.6<L>  /  Alb  3.0<L>  /  TBili  0.3  /  DBili  x   /  AST  30  /  ALT  25  /  AlkPhos  46  10-05    CAPILLARY BLOOD GLUCOSE  143 (06 Oct 2017 22:00)            PT/INR - ( 06 Oct 2017 14:15 )   PT: 14.4 SEC;   INR: 1.28          PTT - ( 06 Oct 2017 14:15 )  PTT:28.5 SEC    Cultures:           < from: Xray Chest 1 View AP -PORTABLE-Routine (10.07.17 @ 06:20) >    INTERPRETATION:     Left-sided chest tube remains.  Loss of volume in the left lung. Pigtail   catheter overlies left upper hemithorax with decreased pneumothorax seen   prior to this study. Right lung shows no interval change. The heart is   not enlarged.      COMPARISON:  October6 at 3:22 PM      IMPRESSION:  Follow-up study post new left chest tube placement with   decreased pneumothorax on this side.                  RANDALL AMADOR M.D.; ATTENDING RADIOLOGIST  This document has been electronically signed. Oct  7 2017 10:37AM    < end of copied text >                    Studies  Chest X-RAY  CT SCAN Chest   Venous Dopplers: LE:   CT Abdomen  Others

## 2017-10-08 LAB
BUN SERPL-MCNC: 14 MG/DL — SIGNIFICANT CHANGE UP (ref 7–23)
CALCIUM SERPL-MCNC: 8.3 MG/DL — LOW (ref 8.4–10.5)
CHLORIDE SERPL-SCNC: 101 MMOL/L — SIGNIFICANT CHANGE UP (ref 98–107)
CO2 SERPL-SCNC: 34 MMOL/L — HIGH (ref 22–31)
CREAT SERPL-MCNC: 0.79 MG/DL — SIGNIFICANT CHANGE UP (ref 0.5–1.3)
GLUCOSE SERPL-MCNC: 89 MG/DL — SIGNIFICANT CHANGE UP (ref 70–99)
HCT VFR BLD CALC: 26.3 % — LOW (ref 39–50)
HGB BLD-MCNC: 8.4 G/DL — LOW (ref 13–17)
MAGNESIUM SERPL-MCNC: 2.1 MG/DL — SIGNIFICANT CHANGE UP (ref 1.6–2.6)
MCHC RBC-ENTMCNC: 31.1 PG — SIGNIFICANT CHANGE UP (ref 27–34)
MCHC RBC-ENTMCNC: 31.9 % — LOW (ref 32–36)
MCV RBC AUTO: 97.4 FL — SIGNIFICANT CHANGE UP (ref 80–100)
NRBC # FLD: 0 — SIGNIFICANT CHANGE UP
PHOSPHATE SERPL-MCNC: 2.7 MG/DL — SIGNIFICANT CHANGE UP (ref 2.5–4.5)
PLATELET # BLD AUTO: 146 K/UL — LOW (ref 150–400)
PMV BLD: 13.7 FL — HIGH (ref 7–13)
POTASSIUM SERPL-MCNC: 4.3 MMOL/L — SIGNIFICANT CHANGE UP (ref 3.5–5.3)
POTASSIUM SERPL-SCNC: 4.3 MMOL/L — SIGNIFICANT CHANGE UP (ref 3.5–5.3)
RBC # BLD: 2.7 M/UL — LOW (ref 4.2–5.8)
RBC # FLD: 16.4 % — HIGH (ref 10.3–14.5)
SODIUM SERPL-SCNC: 142 MMOL/L — SIGNIFICANT CHANGE UP (ref 135–145)
WBC # BLD: 8.19 K/UL — SIGNIFICANT CHANGE UP (ref 3.8–10.5)
WBC # FLD AUTO: 8.19 K/UL — SIGNIFICANT CHANGE UP (ref 3.8–10.5)

## 2017-10-08 PROCEDURE — 71010: CPT | Mod: 26

## 2017-10-08 PROCEDURE — 99233 SBSQ HOSP IP/OBS HIGH 50: CPT

## 2017-10-08 RX ORDER — ALBUTEROL 90 UG/1
2 AEROSOL, METERED ORAL EVERY 6 HOURS
Qty: 0 | Refills: 0 | Status: DISCONTINUED | OUTPATIENT
Start: 2017-10-08 | End: 2017-10-09

## 2017-10-08 RX ORDER — ALPRAZOLAM 0.25 MG
0.25 TABLET ORAL ONCE
Qty: 0 | Refills: 0 | Status: DISCONTINUED | OUTPATIENT
Start: 2017-10-08 | End: 2017-10-08

## 2017-10-08 RX ADMIN — PANTOPRAZOLE SODIUM 40 MILLIGRAM(S): 20 TABLET, DELAYED RELEASE ORAL at 07:09

## 2017-10-08 RX ADMIN — APIXABAN 5 MILLIGRAM(S): 2.5 TABLET, FILM COATED ORAL at 17:16

## 2017-10-08 RX ADMIN — DIVALPROEX SODIUM 500 MILLIGRAM(S): 500 TABLET, DELAYED RELEASE ORAL at 17:16

## 2017-10-08 RX ADMIN — DIVALPROEX SODIUM 500 MILLIGRAM(S): 500 TABLET, DELAYED RELEASE ORAL at 07:09

## 2017-10-08 RX ADMIN — Medication 1 MILLIGRAM(S): at 13:34

## 2017-10-08 RX ADMIN — Medication 1 PUFF(S): at 22:24

## 2017-10-08 RX ADMIN — Medication 100 MILLIGRAM(S): at 07:09

## 2017-10-08 RX ADMIN — Medication 325 MILLIGRAM(S): at 13:34

## 2017-10-08 RX ADMIN — VORICONAZOLE 200 MILLIGRAM(S): 10 INJECTION, POWDER, LYOPHILIZED, FOR SOLUTION INTRAVENOUS at 07:09

## 2017-10-08 RX ADMIN — ATORVASTATIN CALCIUM 80 MILLIGRAM(S): 80 TABLET, FILM COATED ORAL at 21:09

## 2017-10-08 RX ADMIN — Medication 1000 MILLIGRAM(S): at 16:15

## 2017-10-08 RX ADMIN — Medication 1 PUFF(S): at 16:17

## 2017-10-08 RX ADMIN — Medication 1 PUFF(S): at 11:07

## 2017-10-08 RX ADMIN — Medication 400 MILLIGRAM(S): at 16:00

## 2017-10-08 RX ADMIN — APIXABAN 5 MILLIGRAM(S): 2.5 TABLET, FILM COATED ORAL at 07:09

## 2017-10-08 RX ADMIN — Medication 20 MILLIGRAM(S): at 07:09

## 2017-10-08 RX ADMIN — Medication 0.25 MILLIGRAM(S): at 21:10

## 2017-10-08 RX ADMIN — PANTOPRAZOLE SODIUM 40 MILLIGRAM(S): 20 TABLET, DELAYED RELEASE ORAL at 17:16

## 2017-10-08 RX ADMIN — CHLORHEXIDINE GLUCONATE 1 APPLICATION(S): 213 SOLUTION TOPICAL at 07:27

## 2017-10-08 RX ADMIN — ALBUTEROL 2 PUFF(S): 90 AEROSOL, METERED ORAL at 16:17

## 2017-10-08 RX ADMIN — Medication 325 MILLIGRAM(S): at 07:09

## 2017-10-08 RX ADMIN — TRAMADOL HYDROCHLORIDE 25 MILLIGRAM(S): 50 TABLET ORAL at 12:15

## 2017-10-08 RX ADMIN — VORICONAZOLE 200 MILLIGRAM(S): 10 INJECTION, POWDER, LYOPHILIZED, FOR SOLUTION INTRAVENOUS at 17:16

## 2017-10-08 RX ADMIN — Medication 81 MILLIGRAM(S): at 13:34

## 2017-10-08 RX ADMIN — TRAMADOL HYDROCHLORIDE 25 MILLIGRAM(S): 50 TABLET ORAL at 11:45

## 2017-10-08 RX ADMIN — Medication 1 PUFF(S): at 03:59

## 2017-10-08 RX ADMIN — ALBUTEROL 2 PUFF(S): 90 AEROSOL, METERED ORAL at 11:06

## 2017-10-08 RX ADMIN — ALBUTEROL 2 PUFF(S): 90 AEROSOL, METERED ORAL at 03:59

## 2017-10-08 RX ADMIN — Medication 325 MILLIGRAM(S): at 21:09

## 2017-10-08 NOTE — PROGRESS NOTE ADULT - SUBJECTIVE AND OBJECTIVE BOX
Patient is a 47y old  Male who presents with a chief complaint of "My leg wouldn't stop shaking." (14 Sep 2017 09:59)    felt SOB on exertion today   Any change in ROS:     MEDICATIONS  (STANDING):  ALBUTerol    90 MICROgram(s) HFA Inhaler 2 Puff(s) Inhalation every 6 hours  apixaban 5 milliGRAM(s) Oral every 12 hours  aspirin  chewable 81 milliGRAM(s) Oral daily  atorvastatin 80 milliGRAM(s) Oral at bedtime  chlorhexidine 4% Liquid 1 Application(s) Topical daily  diVALproex  milliGRAM(s) Oral two times a day  docusate sodium 100 milliGRAM(s) Oral two times a day  ferrous    sulfate 325 milliGRAM(s) Oral every 8 hours  folic acid 1 milliGRAM(s) Oral daily  ipratropium 17 MICROgram(s) HFA Inhaler 1 Puff(s) Inhalation every 6 hours  pantoprazole    Tablet 40 milliGRAM(s) Oral two times a day before meals  polyethylene glycol 3350 17 Gram(s) Oral at bedtime  predniSONE   Tablet 20 milliGRAM(s) Oral daily  voriconazole 200 milliGRAM(s) Oral every 12 hours    MEDICATIONS  (PRN):  acetaminophen    Suspension 650 milliGRAM(s) Oral every 6 hours PRN For Temp greater than 38 C (100.4 F)  acetaminophen   Tablet. 650 milliGRAM(s) Oral every 4 hours PRN Mild Pain (1 - 3)  acetaminophen   Tablet. 650 milliGRAM(s) Oral every 6 hours PRN Mild Pain (1 - 3)  acetaminophen  IVPB. 1000 milliGRAM(s) IV Intermittent once PRN Moderate Pain (4 - 6)  aluminum hydroxide/magnesium hydroxide/simethicone Suspension 30 milliLiter(s) Oral every 6 hours PRN Dyspepsia  HYDROmorphone  Injectable 1 milliGRAM(s) IV Push every 4 hours PRN breakthrough pain  traMADol 25 milliGRAM(s) Oral every 4 hours PRN Moderate Pain (4 - 6)    Vital Signs Last 24 Hrs  T(C): 37.2 (08 Oct 2017 12:00), Max: 37.3 (08 Oct 2017 00:00)  T(F): 98.9 (08 Oct 2017 12:00), Max: 99.1 (08 Oct 2017 00:00)  HR: 89 (08 Oct 2017 12:00) (65 - 96)  BP: 104/65 (08 Oct 2017 12:00) (92/58 - 114/71)  BP(mean): 74 (08 Oct 2017 12:00) (65 - 82)  RR: 16 (08 Oct 2017 12:00) (12 - 19)  SpO2: 100% (08 Oct 2017 12:00) (98% - 100%)    I&O's Summary    07 Oct 2017 07:01  -  08 Oct 2017 07:00  --------------------------------------------------------  IN: 700 mL / OUT: 1505 mL / NET: -805 mL    08 Oct 2017 07:01  -  08 Oct 2017 13:19  --------------------------------------------------------  IN: 600 mL / OUT: 375 mL / NET: 225 mL          Physical Exam:   GENERAL: NAD, well-groomed, well-developed  HEENT: GIOVANI/   Atraumatic, Normocephalic  ENMT: No tonsillar erythema, exudates, or enlargement; Moist mucous membranes, Good dentition, No lesions  NECK: Supple, No JVD, Normal thyroid  CHEST/LUNG: poor resp excursion  CVS: Regular rate and rhythm; No murmurs, rubs, or gallops  GI: : Soft, Nontender, Nondistended; Bowel sounds present  NERVOUS SYSTEM:  Alert & Oriented X3  EXTREMITIES:  2+ Peripheral Pulses, No clubbing, cyanosis, or edema  LYMPH: No lymphadenopathy noted  SKIN: No rashes or lesions  ENDOCRINOLOGY: No Thyromegaly  PSYCH: Appropriate    Labs:  ABG - ( 06 Oct 2017 16:30 )  pH: 7.41  /  pCO2: 59    /  pO2: 225   / HCO3: 35    / Base Excess: 11.7  /  SaO2: 100                                 8.4    8.19  )-----------( 146      ( 08 Oct 2017 06:40 )             26.3                         8.5    6.61  )-----------( 183      ( 07 Oct 2017 04:36 )             27.1                         8.8    7.44  )-----------( 179      ( 06 Oct 2017 16:30 )             28.3                         9.0    8.47  )-----------( 176      ( 05 Oct 2017 06:32 )             28.6     10-08    142  |  101  |  14  ----------------------------<  89  4.3   |  34<H>  |  0.79  10-07    143  |  100  |  17  ----------------------------<  108<H>  5.1   |  33<H>  |  0.94  10-06    146<H>  |  102  |  13  ----------------------------<  89  4.4   |  35<H>  |  0.89  10-05    143  |  102  |  15  ----------------------------<  86  3.9   |  36<H>  |  0.92    Ca    8.3<L>      08 Oct 2017 06:40  Ca    8.4      07 Oct 2017 04:36  Ca    8.5      06 Oct 2017 16:30  Phos  2.7     10-08  Phos  3.2     10-07  Mg     2.1     10-08  Mg     2.0     10-07    TPro  5.6<L>  /  Alb  3.0<L>  /  TBili  0.3  /  DBili  x   /  AST  30  /  ALT  25  /  AlkPhos  46  10-05    CAPILLARY BLOOD GLUCOSE            PT/INR - ( 06 Oct 2017 14:15 )   PT: 14.4 SEC;   INR: 1.28          PTT - ( 06 Oct 2017 14:15 )  PTT:28.5 SEC    Cultures:         < from: Xray Chest 1 View AP -PORTABLE-Routine (10.08.17 @ 07:33) >  ICAL INFORMATION: Post left upper lobectomy with pneumothorax.    TECHNIQUE:   Portable chest    INTERPRETATION:     2 left-sided chest tubes unchanged from the last exam with extensive   subcutaneous emphysema on this side. No pneumothorax appreciated in   either lung. No focal consolidations. The heart is not enlarged.      COMPARISON:  October 7      IMPRESSION:  Status post left thoracotomy with 2 chest tubes and no   pneumothorax seen.                  RANDALL AMADOR M.D.; ATTENDING RADIOLOGIST  This document has been electronically signed. Oct  8 2017 11:31AM        < end of copied text >                      Studies  Chest X-RAY  CT SCAN Chest   Venous Dopplers: LE:   CT Abdomen  Others

## 2017-10-08 NOTE — PROGRESS NOTE ADULT - PROBLEM SELECTOR PLAN 1
S/P surgery : reop twice secondary to hemothroax: curently on pressors and ventilator support: He is wheezing extensively: started on IV steroids 20 mg twice a day , but should be given only for few days : follow his wheezing daily as soon as it is resolved; dc steroids For now their is no hemoptysis: He had twice reop yesterday secondary to hemothorax and bleeding from chest tube: currently on pressors and seated and on broad spectrum antibiotics.  9/27: he seems to be doing better today: for extubation  9/28: extubated: alert and awake and responding: He is on zosyn any way with voriconazole!  9/29: chest x-ray today , to me looks better then yesterday: There is no official report yet:  10/02: doing ok  10/3: on eliquis: toelrating well: no hemoptysis  10/4 : stable  10/05: persistent left upper air space following DAVID lobectomy: defer to thoracic surgery  10/06: stable?: no HEMOPTYSIS  10/7: resolved  10/8: cont prednisone: pneumothorax has resolved

## 2017-10-08 NOTE — PROGRESS NOTE ADULT - PROBLEM SELECTOR PLAN 3
wheezing is present :pk pressures in 30's : started him on steroids again  bronchodilators ATC:  9/27; if he continues to show improvement: can decrease his solumedrol tomorrow  9/28: decreases his IV steroids to 20 mg once a day for a few days more  9/29: on 20 mg per day of solumedrol: would continue for few more days and then decrease it  9/30: doing ok: - wheezing: decrease his steroids to 10 mg a day from tomorrow  10/1: change to po prednisone 10 mg  10/02; cont po prednisone  10/3: stop prednisone after today,s dose?  10/4 : dc prednisone after tomorrow dose  10/5: DC prednisone today  10/6: OF PREDNISONE NOW  10/07: has sarcoidosis too: SOB with poor resp excursion: would start him on 20 mg of prednisone  10/8: cont prednisone with BD

## 2017-10-08 NOTE — PROGRESS NOTE ADULT - SUBJECTIVE AND OBJECTIVE BOX
47 M PMH of sarcoidosis, HTN, COPD, and asthma who was initially admitted on 09/10 w cc of severe leg spasms X two days  In E.D. pt found to be wheezing, so he was seen by Pulmonary and started on IV Steroids for sarcoidosis  On 9/12 at 4 am, a RRT and Stroke codes were called as the patient became unresponsive.  He was transferred to MICU and then intubated for airway protection.   The patient was found to have a new infarct on MRI and also found to have a patent PFO on this admission as well. He was weaned off vent on 09/13 and transferred to the floors   There was concern patient was having paroxysmal afib, so he was started on Eliquis yesterday evening. He has received 2 doses of Eliquis on 09/19.   However, on 09/20, the patient had a coughing episode, which was not unusual for him, but during this episode he began to cough up fresh blood, about 1/2 cup of blood. He underwent Bronchoscopy with bronchoalveolar lavage of both sides and epi injection on 09/21 and was to have received further IR embolization for hemoptysis control in the next few days.  However, he developed hemoptysis again and underwent urgent LULectomy, complicated with post op bleeding.    Left VATS and DAVID lobectomy 9/24/17 [POD # 14]  Re-op L thoracotomy, evacuation of hemothorax (600cc clot, 400cc blood) no obvious site of bleeding  9/25/17 [POD # 13]  Re-op L thoracotomy, evacuation of hemothorax; RTOR x2 100cc clot; 200cc blood: bleeding posterior intercostal arteries identified and cauterized 9/25/17 [POD # 13]  Pt was transferred to ICU because of increased SOB on the floor. Pt also had increased left apical pneumo for which I.R put a   Pigtail catheter. 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              Sarcoidosis               PFO on Eliquis              Anemia  CVA  with L-side weakness              Pneumothorax and Air leak              S/p post op bleeding              S/P Pulm edema, possible TRALI              s/p massive transfusion       Home Medications:   * Incomplete Medication History as of 11-Sep-2017 09:23 documented in Structured Notes  · 	Azithromycin 5 Day Dose Pack 250 mg oral tablet: 1 dose(s) orally once a day for respiratory infection, Last Dose Taken:    · 	predniSONE 50 mg oral tablet: 1 tab(s) orally once a day , Last Dose Taken:    · 	albuterol 1.25 mg/3 mL (0.042%) inhalation solution: 3 milliliter(s) inhaled 3 times a day, Last Dose Taken:    · 	losartan 25 mg oral tablet: 1 tab(s) orally 2 times a day, Last Dose Taken:    · 	Symbicort 160 mcg-4.5 mcg/inh inhalation aerosol: 2 puff(s) inhaled 2 times a day, Last Dose Taken:      MEDICATIONS  (STANDING):  ALBUTerol    90 MICROgram(s) HFA Inhaler 2 Puff(s) Inhalation every 6 hours  apixaban 5 milliGRAM(s) Oral every 12 hours  aspirin  chewable 81 milliGRAM(s) Oral daily  atorvastatin 80 milliGRAM(s) Oral at bedtime  chlorhexidine 4% Liquid 1 Application(s) Topical daily  diVALproex  milliGRAM(s) Oral two times a day  docusate sodium 100 milliGRAM(s) Oral two times a day  ferrous    sulfate 325 milliGRAM(s) Oral every 8 hours  folic acid 1 milliGRAM(s) Oral daily  ipratropium 17 MICROgram(s) HFA Inhaler 1 Puff(s) Inhalation every 6 hours  pantoprazole    Tablet 40 milliGRAM(s) Oral two times a day before meals  polyethylene glycol 3350 17 Gram(s) Oral at bedtime  predniSONE   Tablet 20 milliGRAM(s) Oral daily  voriconazole 200 milliGRAM(s) Oral every 12 hours    MEDICATIONS  (PRN):  acetaminophen    Suspension 650 milliGRAM(s) Oral every 6 hours PRN For Temp greater than 38 C (100.4 F)  acetaminophen   Tablet. 650 milliGRAM(s) Oral every 4 hours PRN Mild Pain (1 - 3)  acetaminophen   Tablet. 650 milliGRAM(s) Oral every 6 hours PRN Mild Pain (1 - 3)  acetaminophen  IVPB. 1000 milliGRAM(s) IV Intermittent once PRN Moderate Pain (4 - 6)  aluminum hydroxide/magnesium hydroxide/simethicone Suspension 30 milliLiter(s) Oral every 6 hours PRN Dyspepsia  HYDROmorphone  Injectable 1 milliGRAM(s) IV Push every 4 hours PRN breakthrough pain  traMADol 25 milliGRAM(s) Oral every 4 hours PRN Moderate Pain (4 - 6)    ICU Vital Signs Last 24 Hrs  T(C): 37 (08 Oct 2017 04:00), Max: 37 (07 Oct 2017 12:00)  T(F): 98.6 (08 Oct 2017 04:00), Max: 98.6 (07 Oct 2017 12:00)  HR: 69 (08 Oct 2017 06:00) (65 - 102)  BP: 105/71 (08 Oct 2017 06:00) (92/58 - 114/71)  BP(mean): 79 (08 Oct 2017 06:00) (63 - 80)  RR: 15 (08 Oct 2017 06:00) (12 - 18)  SpO2: 100% (08 Oct 2017 06:00) (98% - 100%)    Physical exam:                           General:               Awake, alert,                                                Neuro:                 focal,  weak   L>R  KAMLESH>LL                          Cardiovascular:    S1 & S2, regular                           Respiratory:         Air entry is decreased at left upper chest and has bilateral conducted sounds, rhonchi                           GI:                       Soft, nondistended and nontender, Bowel sounds active                            Ext:                      No cyanosis &  mild  bilateral  edema of upper ext                           I&O's Summary    07 Oct 2017 07:01  -  08 Oct 2017 07:00  --------------------------------------------------------  IN: 700 mL / OUT: 1485 mL / NET: -785 mL    Labs:                                                                        8.5    6.61  )-----------( 183      ( 07 Oct 2017 04:36 )             27.1                            10-07  143  |  100  |  17  ----------------------------<  108<H>  5.1   |  33<H>  |  0.94    Ca    8.4      07 Oct 2017 04:36  Phos  3.2     10-07  Mg     2.0     10-07    CAPILLARY BLOOD GLUCOSE  143 (06 Oct 2017 22:00)                        PT/INR - ( 06 Oct 2017 14:15 )   PT: 14.4 SEC;   INR: 1.28     PTT - ( 06 Oct 2017 14:15 )  PTT:28.5 SEC          ABG - ( 06 Oct 2017 16:30 )  pH: 7.41  /  pCO2: 59    /  pO2: 225   / HCO3: 35    / Base Excess: 11.7  /  SaO2: 100       Plan:  47 M PMH of sarcoidosis, HTN, COPD, and asthma who was initially admitted on 09/10 w cc of severe leg spasms X two days  In E.D. pt found to be wheezing, so he was seen by Pulmonary and started on IV Steroids for sarcoidosis  On 9/12 at 4 am, a RRT and Stroke codes were called as the patient became unresponsive.  He was transferred to MICU and then intubated for airway protection.   The patient was found to have a new infarct on MRI and also found to have a patent PFO on this admission as well. He was weaned off vent on 09/13 and transferred to the floors   There was concern patient was having paroxysmal afib, so he was started on Eliquis yesterday evening. He has received 2 doses of Eliquis on 09/19.   However, on 09/20, the patient had a coughing episode, which was not unusual for him, but during this episode he began to cough up fresh blood, about 1/2 cup of blood. He underwent Bronchoscopy with bronchoalveolar lavage of both sides and epi injection on 09/21 and was to have received further IR embolization for hemoptysis control in the next few days.  However, he developed hemoptysis again and underwent urgent LULectomy, complicated with post op bleeding.    Left VATS and DAVID lobectomy 9/24/17 [POD # 14]  Re-op L thoracotomy, evacuation of hemothorax (600cc clot, 400cc blood) no obvious site of bleeding  9/25/17 [POD # 13]  Re-op L thoracotomy, evacuation of hemothorax; RTOR x2 100cc clot; 200cc blood: bleeding posterior intercostal arteries identified and cauterized 9/25/17 [POD # 13]  Pt was transferred to ICU because of increased SOB on the floor. Pt also had increased left apical pneumo for which I.R put a   Pigtail catheter. 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 Physical medicine evaluation.                              Cardiovascular:                                             Continue hemodynamic monitoring.    PFO - On Eliquis 5mg/bid                            Respiratory:                                                                                                                                                                                                                                                                                                                                                                                                                                                                                                                                                                                                                                                                                                                                                                                                                                                                                                      Comfortable, not in any distress.                                         Monitor chest tube output.                                         Pneumothorax with a new L pigtail cathetr & Chest tube. Both tubes  to suction, -20cm. Small air leak  from pigtail                                                              Sarcoidosis: Steroids tapered off.                                          The patient was given Hydrocortisone X1 while in respiratory distress requiring 100% Fio2, but not continu                                          Will resume steroids only if requested by Pulmonary                             GI                                         Tolerating PO                                         Continue GI prophylaxis with Protonix                                         Continue Zofran / Reglan for nausea - PRN	                                                                 Renal:                                                                                  Monitor I/Os and electrolytes                                                 Hem/ Onc:                                                                                  Monitor chest tube output     Anemia: Continue Iron / FA                            Infectious disease:    Aspergilloma - On voriconazole 200 milliGRAM(s) Oral every 12 hours.     Monitor for fever / leukocytosis.                                         All surgical incision / chest tube  sites look clean                            Endocrine                                          Continue Accu-Checks with coverage    All available pertinent clinical, laboratory, radiographic, hemodynamic, echocardiographic, respiratory data, microbiologic data and chart were reviewed and analyzed   Patient seen, examined and plan discussed with CT Surgeon / CTICU team.    Prosper Wen MD

## 2017-10-09 LAB
ALBUMIN SERPL ELPH-MCNC: 2.8 G/DL — LOW (ref 3.3–5)
ALP SERPL-CCNC: 53 U/L — SIGNIFICANT CHANGE UP (ref 40–120)
ALT FLD-CCNC: 16 U/L — SIGNIFICANT CHANGE UP (ref 4–41)
APTT BLD: 27.8 SEC — SIGNIFICANT CHANGE UP (ref 27.5–37.4)
AST SERPL-CCNC: 19 U/L — SIGNIFICANT CHANGE UP (ref 4–40)
BILIRUB SERPL-MCNC: 0.2 MG/DL — SIGNIFICANT CHANGE UP (ref 0.2–1.2)
BUN SERPL-MCNC: 13 MG/DL — SIGNIFICANT CHANGE UP (ref 7–23)
CALCIUM SERPL-MCNC: 8.1 MG/DL — LOW (ref 8.4–10.5)
CHLORIDE SERPL-SCNC: 103 MMOL/L — SIGNIFICANT CHANGE UP (ref 98–107)
CO2 SERPL-SCNC: 36 MMOL/L — HIGH (ref 22–31)
CREAT SERPL-MCNC: 0.84 MG/DL — SIGNIFICANT CHANGE UP (ref 0.5–1.3)
GLUCOSE SERPL-MCNC: 92 MG/DL — SIGNIFICANT CHANGE UP (ref 70–99)
HCT VFR BLD CALC: 26.3 % — LOW (ref 39–50)
HGB BLD-MCNC: 8.2 G/DL — LOW (ref 13–17)
INR BLD: 1.22 — HIGH (ref 0.88–1.17)
MAGNESIUM SERPL-MCNC: 2 MG/DL — SIGNIFICANT CHANGE UP (ref 1.6–2.6)
MCHC RBC-ENTMCNC: 30.8 PG — SIGNIFICANT CHANGE UP (ref 27–34)
MCHC RBC-ENTMCNC: 31.2 % — LOW (ref 32–36)
MCV RBC AUTO: 98.9 FL — SIGNIFICANT CHANGE UP (ref 80–100)
NRBC # FLD: 0 — SIGNIFICANT CHANGE UP
PHOSPHATE SERPL-MCNC: 2.8 MG/DL — SIGNIFICANT CHANGE UP (ref 2.5–4.5)
PLATELET # BLD AUTO: 153 K/UL — SIGNIFICANT CHANGE UP (ref 150–400)
PMV BLD: 13.2 FL — HIGH (ref 7–13)
POTASSIUM SERPL-MCNC: 4 MMOL/L — SIGNIFICANT CHANGE UP (ref 3.5–5.3)
POTASSIUM SERPL-SCNC: 4 MMOL/L — SIGNIFICANT CHANGE UP (ref 3.5–5.3)
PROT SERPL-MCNC: 5.3 G/DL — LOW (ref 6–8.3)
PROTHROM AB SERPL-ACNC: 13.7 SEC — HIGH (ref 9.8–13.1)
RBC # BLD: 2.66 M/UL — LOW (ref 4.2–5.8)
RBC # FLD: 16.8 % — HIGH (ref 10.3–14.5)
SODIUM SERPL-SCNC: 145 MMOL/L — SIGNIFICANT CHANGE UP (ref 135–145)
WBC # BLD: 7.95 K/UL — SIGNIFICANT CHANGE UP (ref 3.8–10.5)
WBC # FLD AUTO: 7.95 K/UL — SIGNIFICANT CHANGE UP (ref 3.8–10.5)

## 2017-10-09 PROCEDURE — 99233 SBSQ HOSP IP/OBS HIGH 50: CPT

## 2017-10-09 PROCEDURE — 71010: CPT | Mod: 26

## 2017-10-09 RX ORDER — IPRATROPIUM/ALBUTEROL SULFATE 18-103MCG
3 AEROSOL WITH ADAPTER (GRAM) INHALATION EVERY 4 HOURS
Qty: 0 | Refills: 0 | Status: DISCONTINUED | OUTPATIENT
Start: 2017-10-09 | End: 2017-10-09

## 2017-10-09 RX ORDER — ALBUTEROL 90 UG/1
2 AEROSOL, METERED ORAL EVERY 4 HOURS
Qty: 0 | Refills: 0 | Status: DISCONTINUED | OUTPATIENT
Start: 2017-10-09 | End: 2017-10-19

## 2017-10-09 RX ORDER — IPRATROPIUM BROMIDE 0.2 MG/ML
2 SOLUTION, NON-ORAL INHALATION EVERY 8 HOURS
Qty: 0 | Refills: 0 | Status: DISCONTINUED | OUTPATIENT
Start: 2017-10-09 | End: 2017-10-21

## 2017-10-09 RX ORDER — BUDESONIDE, MICRONIZED 100 %
0.5 POWDER (GRAM) MISCELLANEOUS EVERY 12 HOURS
Qty: 0 | Refills: 0 | Status: DISCONTINUED | OUTPATIENT
Start: 2017-10-09 | End: 2017-10-09

## 2017-10-09 RX ORDER — BUDESONIDE AND FORMOTEROL FUMARATE DIHYDRATE 160; 4.5 UG/1; UG/1
2 AEROSOL RESPIRATORY (INHALATION) EVERY 12 HOURS
Qty: 0 | Refills: 0 | Status: DISCONTINUED | OUTPATIENT
Start: 2017-10-09 | End: 2017-11-02

## 2017-10-09 RX ORDER — ACETAMINOPHEN 500 MG
1000 TABLET ORAL ONCE
Qty: 0 | Refills: 0 | Status: DISCONTINUED | OUTPATIENT
Start: 2017-10-09 | End: 2017-10-11

## 2017-10-09 RX ORDER — IPRATROPIUM/ALBUTEROL SULFATE 18-103MCG
3 AEROSOL WITH ADAPTER (GRAM) INHALATION ONCE
Qty: 0 | Refills: 0 | Status: COMPLETED | OUTPATIENT
Start: 2017-10-09 | End: 2017-10-09

## 2017-10-09 RX ADMIN — Medication 325 MILLIGRAM(S): at 21:20

## 2017-10-09 RX ADMIN — Medication 81 MILLIGRAM(S): at 11:11

## 2017-10-09 RX ADMIN — VORICONAZOLE 200 MILLIGRAM(S): 10 INJECTION, POWDER, LYOPHILIZED, FOR SOLUTION INTRAVENOUS at 06:28

## 2017-10-09 RX ADMIN — CHLORHEXIDINE GLUCONATE 1 APPLICATION(S): 213 SOLUTION TOPICAL at 06:29

## 2017-10-09 RX ADMIN — Medication 325 MILLIGRAM(S): at 13:11

## 2017-10-09 RX ADMIN — ALBUTEROL 2 PUFF(S): 90 AEROSOL, METERED ORAL at 15:33

## 2017-10-09 RX ADMIN — VORICONAZOLE 200 MILLIGRAM(S): 10 INJECTION, POWDER, LYOPHILIZED, FOR SOLUTION INTRAVENOUS at 17:52

## 2017-10-09 RX ADMIN — Medication 1 PUFF(S): at 09:40

## 2017-10-09 RX ADMIN — HYDROMORPHONE HYDROCHLORIDE 1 MILLIGRAM(S): 2 INJECTION INTRAMUSCULAR; INTRAVENOUS; SUBCUTANEOUS at 02:34

## 2017-10-09 RX ADMIN — Medication 2 PUFF(S): at 22:01

## 2017-10-09 RX ADMIN — Medication 3 MILLILITER(S): at 12:08

## 2017-10-09 RX ADMIN — BUDESONIDE AND FORMOTEROL FUMARATE DIHYDRATE 2 PUFF(S): 160; 4.5 AEROSOL RESPIRATORY (INHALATION) at 22:58

## 2017-10-09 RX ADMIN — Medication 1 MILLIGRAM(S): at 11:10

## 2017-10-09 RX ADMIN — Medication 40 MILLIGRAM(S): at 21:24

## 2017-10-09 RX ADMIN — APIXABAN 5 MILLIGRAM(S): 2.5 TABLET, FILM COATED ORAL at 17:52

## 2017-10-09 RX ADMIN — HYDROMORPHONE HYDROCHLORIDE 1 MILLIGRAM(S): 2 INJECTION INTRAMUSCULAR; INTRAVENOUS; SUBCUTANEOUS at 02:45

## 2017-10-09 RX ADMIN — Medication 40 MILLIGRAM(S): at 14:20

## 2017-10-09 RX ADMIN — APIXABAN 5 MILLIGRAM(S): 2.5 TABLET, FILM COATED ORAL at 06:28

## 2017-10-09 RX ADMIN — ATORVASTATIN CALCIUM 80 MILLIGRAM(S): 80 TABLET, FILM COATED ORAL at 21:20

## 2017-10-09 RX ADMIN — TRAMADOL HYDROCHLORIDE 25 MILLIGRAM(S): 50 TABLET ORAL at 08:00

## 2017-10-09 RX ADMIN — ALBUTEROL 2 PUFF(S): 90 AEROSOL, METERED ORAL at 21:50

## 2017-10-09 RX ADMIN — DIVALPROEX SODIUM 500 MILLIGRAM(S): 500 TABLET, DELAYED RELEASE ORAL at 17:52

## 2017-10-09 RX ADMIN — Medication 325 MILLIGRAM(S): at 06:28

## 2017-10-09 RX ADMIN — Medication 0.5 MILLIGRAM(S): at 12:07

## 2017-10-09 RX ADMIN — PANTOPRAZOLE SODIUM 40 MILLIGRAM(S): 20 TABLET, DELAYED RELEASE ORAL at 06:28

## 2017-10-09 RX ADMIN — Medication 20 MILLIGRAM(S): at 06:28

## 2017-10-09 RX ADMIN — Medication 1 PUFF(S): at 03:32

## 2017-10-09 RX ADMIN — TRAMADOL HYDROCHLORIDE 25 MILLIGRAM(S): 50 TABLET ORAL at 08:30

## 2017-10-09 RX ADMIN — Medication 1 PUFF(S): at 15:30

## 2017-10-09 RX ADMIN — PANTOPRAZOLE SODIUM 40 MILLIGRAM(S): 20 TABLET, DELAYED RELEASE ORAL at 17:52

## 2017-10-09 RX ADMIN — DIVALPROEX SODIUM 500 MILLIGRAM(S): 500 TABLET, DELAYED RELEASE ORAL at 06:28

## 2017-10-09 NOTE — PROGRESS NOTE ADULT - SUBJECTIVE AND OBJECTIVE BOX
47 M PMH of sarcoidosis, HTN, COPD, and asthma who was initially admitted on 09/10 w cc of severe leg spasms X two days  In E.D. pt found to be wheezing, so he was seen by Pulmonary and started on IV Steroids for sarcoidosis  On 9/12 at 4 am, a RRT and Stroke codes were called as the patient became unresponsive.  He was transferred to MICU and then intubated for airway protection.   The patient was found to have a new infarct on MRI and also found to have a patent PFO on this admission as well. He was weaned off vent on 09/13 and transferred to the floors   There was concern patient was having paroxysmal afib, so he was started on Eliquis yesterday evening. He has received 2 doses of Eliquis on 09/19.   However, on 09/20, the patient had a coughing episode, which was not unusual for him, but during this episode he began to cough up fresh blood, about 1/2 cup of blood. He underwent Bronchoscopy with bronchoalveolar lavage of both sides and epi injection on 09/21 and was to have received further IR embolization for hemoptysis control in the next few days.  However, he developed hemoptysis again and underwent urgent LULectomy, complicated with post op bleeding.    Left VATS and DAVID lobectomy 9/24/17 [POD # 15]  Re-op L thoracotomy, evacuation of hemothorax (600cc clot, 400cc blood) no obvious site of bleeding  9/25/17 [POD # 14]  Re-op L thoracotomy, evacuation of hemothorax; RTOR x2 100cc clot; 200cc blood: bleeding posterior intercostal arteries identified and cauterized 9/25/17 [POD # 14]  Pt was transferred to ICU because of increased SOB on the floor. Pt also had increased left apical pneumo for which I.R put a   Pigtail catheter. 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              Sarcoidosis               PFO on Eliquis              Anemia  CVA  with L-side weakness              Pneumothorax and Air leak              S/p post op bleeding              S/P Pulm edema, possible TRALI              s/p massive transfusion       Home Medications:   * Incomplete Medication History as of 11-Sep-2017 09:23 documented in Structured Notes  · 	Azithromycin 5 Day Dose Pack 250 mg oral tablet: 1 dose(s) orally once a day for respiratory infection, Last Dose Taken:    · 	predniSONE 50 mg oral tablet: 1 tab(s) orally once a day , Last Dose Taken:    · 	albuterol 1.25 mg/3 mL (0.042%) inhalation solution: 3 milliliter(s) inhaled 3 times a day, Last Dose Taken:    · 	losartan 25 mg oral tablet: 1 tab(s) orally 2 times a day, Last Dose Taken:    · 	Symbicort 160 mcg-4.5 mcg/inh inhalation aerosol: 2 puff(s) inhaled 2 times a day, Last Dose Taken:      MEDICATIONS  (STANDING):  apixaban 5 milliGRAM(s) Oral every 12 hours  aspirin  chewable 81 milliGRAM(s) Oral daily  atorvastatin 80 milliGRAM(s) Oral at bedtime  chlorhexidine 4% Liquid 1 Application(s) Topical daily  diVALproex  milliGRAM(s) Oral two times a day  docusate sodium 100 milliGRAM(s) Oral two times a day  ferrous    sulfate 325 milliGRAM(s) Oral every 8 hours  folic acid 1 milliGRAM(s) Oral daily  ipratropium 17 MICROgram(s) HFA Inhaler 1 Puff(s) Inhalation every 6 hours  pantoprazole    Tablet 40 milliGRAM(s) Oral two times a day before meals  polyethylene glycol 3350 17 Gram(s) Oral at bedtime  predniSONE   Tablet 20 milliGRAM(s) Oral daily  voriconazole 200 milliGRAM(s) Oral every 12 hours    MEDICATIONS  (PRN):  acetaminophen    Suspension 650 milliGRAM(s) Oral every 6 hours PRN For Temp greater than 38 C (100.4 F)  acetaminophen   Tablet. 650 milliGRAM(s) Oral every 4 hours PRN Mild Pain (1 - 3)  acetaminophen   Tablet. 650 milliGRAM(s) Oral every 6 hours PRN Mild Pain (1 - 3)  ALBUTerol    90 MICROgram(s) HFA Inhaler 2 Puff(s) Inhalation every 6 hours PRN Shortness of Breath and/or Wheezing  aluminum hydroxide/magnesium hydroxide/simethicone Suspension 30 milliLiter(s) Oral every 6 hours PRN Dyspepsia  HYDROmorphone  Injectable 1 milliGRAM(s) IV Push every 4 hours PRN breakthrough pain  traMADol 25 milliGRAM(s) Oral every 4 hours PRN Moderate Pain (4 - 6)      ICU Vital Signs Last 24 Hrs  T(C): 36.6 (09 Oct 2017 04:00), Max: 37.2 (08 Oct 2017 12:00)  T(F): 97.9 (09 Oct 2017 04:00), Max: 98.9 (08 Oct 2017 12:00)  HR: 76 (09 Oct 2017 07:00) (65 - 89)  BP: 103/66 (09 Oct 2017 07:00) (99/63 - 120/76)  BP(mean): 76 (09 Oct 2017 07:00) (65 - 86)  RR: 12 (09 Oct 2017 07:00) (12 - 18)  SpO2: 100% (09 Oct 2017 07:00) (91% - 100%)    Physical exam:                           General:               Awake, alert,                                                Neuro:                 focal,  weak   L>R  KAMLESH>LL                          Cardiovascular:    S1 & S2, regular                           Respiratory:         Air entry is decreased at left upper chest and has bilateral conducted sounds, rhonchi                           GI:                       Soft, nondistended and nontender, Bowel sounds active                            Ext:                      No cyanosis &  mild  bilateral  edema of upper ext    I&O's Summary  08 Oct 2017 07:01  -  09 Oct 2017 07:00  --------------------------------------------------------  IN: 940 mL / OUT: 1170 mL / NET: -230 mL    Labs:                                                                         8.2    7.95  )-----------( 153      ( 09 Oct 2017 04:25 )             26.3                            10-09    145  |  103  |  13  ----------------------------<  92  4.0   |  36<H>  |  0.84    Ca    8.1<L>      09 Oct 2017 04:25  Phos  2.8     10-09  Mg     2.0     10-09    TPro  5.3<L>  /  Alb  2.8<L>  /  TBili  0.2  /  DBili  x   /  AST  19  /  ALT  16  /  AlkPhos  53  10-09    LIVER FUNCTIONS - ( 09 Oct 2017 04:25 )  Alb: 2.8 g/dL / Pro: 5.3 g/dL / ALK PHOS: 53 u/L / ALT: 16 u/L / AST: 19 u/L / GGT: x                                PT/INR - ( 09 Oct 2017 04:25 )   PT: 13.7 SEC;   INR: 1.22     PTT - ( 09 Oct 2017 04:25 )  PTT:27.8 SEC          Plan:  47 M PMH of sarcoidosis, HTN, COPD, and asthma who was initially admitted on 09/10 w cc of severe leg spasms X two days  In E.D. pt found to be wheezing, so he was seen by Pulmonary and started on IV Steroids for sarcoidosis  On 9/12 at 4 am, a RRT and Stroke codes were called as the patient became unresponsive.  He was transferred to MICU and then intubated for airway protection.   The patient was found to have a new infarct on MRI and also found to have a patent PFO on this admission as well. He was weaned off vent on 09/13 and transferred to the floors   There was concern patient was having paroxysmal afib, so he was started on Eliquis yesterday evening. He has received 2 doses of Eliquis on 09/19.   However, on 09/20, the patient had a coughing episode, which was not unusual for him, but during this episode he began to cough up fresh blood, about 1/2 cup of blood. He underwent Bronchoscopy with bronchoalveolar lavage of both sides and epi injection on 09/21 and was to have received further IR embolization for hemoptysis control in the next few days.  However, he developed hemoptysis again and underwent urgent LULectomy, complicated with post op bleeding.  Left VATS and DAVID lobectomy 9/24/17 [POD # 15]  Re-op L thoracotomy, evacuation of hemothorax (600cc clot, 400cc blood) no obvious site of bleeding  9/25/17 [POD # 14]  Re-op L thoracotomy, evacuation of hemothorax; RTOR x2 100cc clot; 200cc blood: bleeding posterior intercostal arteries identified and cauterized 9/25/17 [POD # 14]  Pt was transferred to ICU because of increased SOB on the floor. Pt also had increased left apical pneumo for which I.R put a   Pigtail catheter. 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 Physical medicine evaluation.                              Cardiovascular:                                             Continue hemodynamic monitoring.    PFO - On Eliquis 5mg/bid                            Respiratory:                                                                                                                                                                                                                                                                                                                                                                                                                                                                                                                                                                                                                                                                                                                                                                                                                                                                                                      Comfortable, not in any distress.                                         Monitor chest tube output.                                         Pneumothorax with a new L pigtail cathetr & Chest tube. Both tubes  to suction, -20cm. Small air leak  from pigtail                                                              Sarcoidosis: Steroids tapered off.                                          The patient was given Hydrocortisone X1 while in respiratory distress requiring 100% Fio2, but not continu                                          Will resume steroids only if requested by Pulmonary                             GI                                         Tolerating PO                                         Continue GI prophylaxis with Protonix                                         Continue Zofran / Reglan for nausea - PRN	                                                                 Renal:                                                                                  Monitor I/Os and electrolytes                                                 Hem/ Onc:                                                                                  Monitor chest tube output     Anemia: Continue Iron / FA                            Infectious disease:    Aspergilloma - On voriconazole 200 milliGRAM(s) Oral every 12 hours.     Monitor for fever / leukocytosis.                                         All surgical incision / chest tube  sites look clean                            Endocrine                                          Continue Accu-Checks with coverage    All available pertinent clinical, laboratory, radiographic, hemodynamic, echocardiographic, respiratory data, microbiologic data and chart were reviewed and analyzed   Patient seen, examined and plan discussed with CT Surgeon / CTICU team.    Prosper Wen MD

## 2017-10-09 NOTE — PROGRESS NOTE ADULT - PROBLEM SELECTOR PLAN 6
on steroids at this time: it was given only for a short duration: thinking his wheezing is secondary to rhinovirus infection: It will cont till tomorrow: thereafter would taper it down to off in next 2-3  10/1: change prednisone to 10 mg daily  10/02: cont prednisone  10/03: trop prednisone after today's dose  10/4 stop prednisone tomorrow  10/5: dc prednisone today : pt was not on any treatment for sarcoidosis before  10/06: OFF PREDNISONE NOW  10/7: start prednisone 20 mg per day  10/09: started back on IV steroids given his increased wheezing!!

## 2017-10-09 NOTE — PROGRESS NOTE ADULT - PROBLEM SELECTOR PLAN 1
S/P surgery : reop twice secondary to hemothroax: curently on pressors and ventilator support: He is wheezing extensively: started on IV steroids 20 mg twice a day , but should be given only for few days : follow his wheezing daily as soon as it is resolved; dc steroids For now their is no hemoptysis: He had twice reop yesterday secondary to hemothorax and bleeding from chest tube: currently on pressors and seated and on broad spectrum antibiotics.  9/27: he seems to be doing better today: for extubation  9/28: extubated: alert and awake and responding: He is on zosyn any way with voriconazole!  9/29: chest x-ray today , to me looks better then yesterday: There is no official report yet:  10/02: doing ok  10/3: on eliquis: toelrating well: no hemoptysis  10/4 : stable  10/05: persistent left upper air space following DAVID lobectomy: defer to thoracic surgery  10/06: stable?: no HEMOPTYSIS  10/7: resolved  10/8: cont prednisone: pneumothorax has resolved  10/09: resolved

## 2017-10-09 NOTE — PROGRESS NOTE ADULT - SUBJECTIVE AND OBJECTIVE BOX
Patient is a 47y old  Male who presents with a chief complaint of "My leg wouldn't stop shaking." (14 Sep 2017 09:59)    more wheezy today  says he gets more SOB on walking   Any change in ROS:     MEDICATIONS  (STANDING):  ALBUTerol/ipratropium for Nebulization 3 milliLiter(s) Nebulizer every 4 hours  apixaban 5 milliGRAM(s) Oral every 12 hours  aspirin  chewable 81 milliGRAM(s) Oral daily  atorvastatin 80 milliGRAM(s) Oral at bedtime  buDESOnide   0.5 milliGRAM(s) Respule 0.5 milliGRAM(s) Inhalation every 12 hours  chlorhexidine 4% Liquid 1 Application(s) Topical daily  diVALproex  milliGRAM(s) Oral two times a day  docusate sodium 100 milliGRAM(s) Oral two times a day  ferrous    sulfate 325 milliGRAM(s) Oral every 8 hours  folic acid 1 milliGRAM(s) Oral daily  ipratropium 17 MICROgram(s) HFA Inhaler 1 Puff(s) Inhalation every 6 hours  methylPREDNISolone sodium succinate Injectable 40 milliGRAM(s) IV Push every 8 hours  pantoprazole    Tablet 40 milliGRAM(s) Oral two times a day before meals  polyethylene glycol 3350 17 Gram(s) Oral at bedtime  voriconazole 200 milliGRAM(s) Oral every 12 hours    MEDICATIONS  (PRN):  acetaminophen    Suspension 650 milliGRAM(s) Oral every 6 hours PRN For Temp greater than 38 C (100.4 F)  acetaminophen   Tablet. 650 milliGRAM(s) Oral every 4 hours PRN Mild Pain (1 - 3)  acetaminophen   Tablet. 650 milliGRAM(s) Oral every 6 hours PRN Mild Pain (1 - 3)  aluminum hydroxide/magnesium hydroxide/simethicone Suspension 30 milliLiter(s) Oral every 6 hours PRN Dyspepsia  HYDROmorphone  Injectable 1 milliGRAM(s) IV Push every 4 hours PRN breakthrough pain  traMADol 25 milliGRAM(s) Oral every 4 hours PRN Moderate Pain (4 - 6)    Vital Signs Last 24 Hrs  T(C): 36.9 (09 Oct 2017 12:00), Max: 37.2 (08 Oct 2017 20:00)  T(F): 98.5 (09 Oct 2017 12:00), Max: 98.9 (08 Oct 2017 20:00)  HR: 80 (09 Oct 2017 14:00) (65 - 102)  BP: 107/68 (09 Oct 2017 14:00) (99/63 - 136/73)  BP(mean): 76 (09 Oct 2017 14:00) (65 - 88)  RR: 16 (09 Oct 2017 14:00) (12 - 20)  SpO2: 100% (09 Oct 2017 14:00) (88% - 100%)    I&O's Summary    08 Oct 2017 07:01  -  09 Oct 2017 07:00  --------------------------------------------------------  IN: 940 mL / OUT: 1170 mL / NET: -230 mL    09 Oct 2017 07:01  -  09 Oct 2017 14:47  --------------------------------------------------------  IN: 360 mL / OUT: 350 mL / NET: 10 mL          Physical Exam:   GENERAL: NAD, well-groomed, well-developed  HEENT: GIOVANI/   Atraumatic, Normocephalic  ENMT: No tonsillar erythema, exudates, or enlargement; Moist mucous membranes, Good dentition, No lesions  NECK: Supple, No JVD, Normal thyroid  CHEST/LUNG: wheeziy ++  CVS: Regular rate and rhythm; No murmurs, rubs, or gallops  GI: : Soft, Nontender, Nondistended; Bowel sounds present  NERVOUS SYSTEM:  Alert & Oriented X3  EXTREMITIES:  2+ Peripheral Pulses, No clubbing, cyanosis, or edema  LYMPH: No lymphadenopathy noted  SKIN: No rashes or lesions  ENDOCRINOLOGY: No Thyromegaly  PSYCH: Appropriate    Labs:                              8.2    7.95  )-----------( 153      ( 09 Oct 2017 04:25 )             26.3                         8.4    8.19  )-----------( 146      ( 08 Oct 2017 06:40 )             26.3                         8.5    6.61  )-----------( 183      ( 07 Oct 2017 04:36 )             27.1                         8.8    7.44  )-----------( 179      ( 06 Oct 2017 16:30 )             28.3     10-09    145  |  103  |  13  ----------------------------<  92  4.0   |  36<H>  |  0.84  10-08    142  |  101  |  14  ----------------------------<  89  4.3   |  34<H>  |  0.79  10-07    143  |  100  |  17  ----------------------------<  108<H>  5.1   |  33<H>  |  0.94  10-06    146<H>  |  102  |  13  ----------------------------<  89  4.4   |  35<H>  |  0.89    Ca    8.1<L>      09 Oct 2017 04:25  Ca    8.3<L>      08 Oct 2017 06:40  Phos  2.8     10-09  Phos  2.7     10-08  Mg     2.0     10-09  Mg     2.1     10-08    TPro  5.3<L>  /  Alb  2.8<L>  /  TBili  0.2  /  DBili  x   /  AST  19  /  ALT  16  /  AlkPhos  53  10-09    CAPILLARY BLOOD GLUCOSE          LIVER FUNCTIONS - ( 09 Oct 2017 04:25 )  Alb: 2.8 g/dL / Pro: 5.3 g/dL / ALK PHOS: 53 u/L / ALT: 16 u/L / AST: 19 u/L / GGT: x           PT/INR - ( 09 Oct 2017 04:25 )   PT: 13.7 SEC;   INR: 1.22          PTT - ( 09 Oct 2017 04:25 )  PTT:27.8 SEC    Cultures:         < from: Xray Chest 1 View AP -PORTABLE-Routine (10.09.17 @ 06:33) >  INTERPRETATION:     The heart is not enlarged.  Left pleural and chest tubes again seen, not significantly changed in   position.  No pneumothorax is seen.  No significant interval change in bilateral lung opacities or small   pleural effusions.  Left subcutaneous emphysema again noted.            IMPRESSION:  Left pleural effusion tubes not significantly changed in   position.    No pneumothorax seen.    No significant interval change in bilateral lung opacities and small   pleural effusions.                  KEERTHI FIELDS M.D., ATTENDING RADIOLOGIST  This document has been electronically signed. Oct  9 2017  1:37PM        < end of copied text >                      Studies  Chest X-RAY  CT SCAN Chest   Venous Dopplers: LE:   CT Abdomen  Others

## 2017-10-09 NOTE — CHART NOTE - NSCHARTNOTEFT_GEN_A_CORE
NUTRITION FOLLOW-UP:    Pt seen for critical care nutrition f/u.  At this time, pt continues w/good appetite, eating most of the foods on tray.  At this time, pt reports that lunch meal was interrupted, but that he will resume eating remainder on tray.  He states that he does receive daily menu and receives foods ordered.  Wt fluctuations noted - may be fluid related. Pt w/1+ edema R and L Feet, Ankles.      Weight:  10/9 - 96kg     10/7 - 91kg     10/1 - 92.6kg     Adm: 97kg     IBW: 77.7kg  Labs:  H/H 8.2/26.3  T.Ca 8.1  Alb 2.8    Current Diet:  Regular    RD to Remain Available:  yes    Additional Recommendations:   1) Monitor weights, labs, BM's, skin integrity, p.o. intake.   2) Continue regular diet as tolerated.

## 2017-10-09 NOTE — PROGRESS NOTE ADULT - PROBLEM SELECTOR PLAN 3
wheezing is present :pk pressures in 30's : started him on steroids again  bronchodilators ATC:  9/27; if he continues to show improvement: can decrease his solumedrol tomorrow  9/28: decreases his IV steroids to 20 mg once a day for a few days more  9/29: on 20 mg per day of solumedrol: would continue for few more days and then decrease it  9/30: doing ok: - wheezing: decrease his steroids to 10 mg a day from tomorrow  10/1: change to po prednisone 10 mg  10/02; cont po prednisone  10/3: stop prednisone after today,s dose?  10/4 : dc prednisone after tomorrow dose  10/5: DC prednisone today  10/6: OF PREDNISONE NOW  10/07: has sarcoidosis too: SOB with poor resp excursion: would start him on 20 mg of prednisone  10/8: cont prednisone with BD  10/09: his wheezing has increased today: Would change to IV METHYLPREDNISOLONE: 20 MG Q 8 HOURS

## 2017-10-10 LAB
BUN SERPL-MCNC: 14 MG/DL — SIGNIFICANT CHANGE UP (ref 7–23)
CALCIUM SERPL-MCNC: 9 MG/DL — SIGNIFICANT CHANGE UP (ref 8.4–10.5)
CHLORIDE SERPL-SCNC: 99 MMOL/L — SIGNIFICANT CHANGE UP (ref 98–107)
CO2 SERPL-SCNC: 31 MMOL/L — SIGNIFICANT CHANGE UP (ref 22–31)
CREAT SERPL-MCNC: 0.92 MG/DL — SIGNIFICANT CHANGE UP (ref 0.5–1.3)
GLUCOSE SERPL-MCNC: 117 MG/DL — HIGH (ref 70–99)
HCT VFR BLD CALC: 31.5 % — LOW (ref 39–50)
HGB BLD-MCNC: 10.1 G/DL — LOW (ref 13–17)
MAGNESIUM SERPL-MCNC: 1.9 MG/DL — SIGNIFICANT CHANGE UP (ref 1.6–2.6)
MCHC RBC-ENTMCNC: 31.5 PG — SIGNIFICANT CHANGE UP (ref 27–34)
MCHC RBC-ENTMCNC: 32.1 % — SIGNIFICANT CHANGE UP (ref 32–36)
MCV RBC AUTO: 98.1 FL — SIGNIFICANT CHANGE UP (ref 80–100)
NRBC # FLD: 0 — SIGNIFICANT CHANGE UP
PHOSPHATE SERPL-MCNC: 2.9 MG/DL — SIGNIFICANT CHANGE UP (ref 2.5–4.5)
PLATELET # BLD AUTO: 169 K/UL — SIGNIFICANT CHANGE UP (ref 150–400)
PMV BLD: 13.8 FL — HIGH (ref 7–13)
POTASSIUM SERPL-MCNC: 4.6 MMOL/L — SIGNIFICANT CHANGE UP (ref 3.5–5.3)
POTASSIUM SERPL-SCNC: 4.6 MMOL/L — SIGNIFICANT CHANGE UP (ref 3.5–5.3)
RBC # BLD: 3.21 M/UL — LOW (ref 4.2–5.8)
RBC # FLD: 16.4 % — HIGH (ref 10.3–14.5)
SODIUM SERPL-SCNC: 142 MMOL/L — SIGNIFICANT CHANGE UP (ref 135–145)
WBC # BLD: 10.64 K/UL — HIGH (ref 3.8–10.5)
WBC # FLD AUTO: 10.64 K/UL — HIGH (ref 3.8–10.5)

## 2017-10-10 PROCEDURE — 99233 SBSQ HOSP IP/OBS HIGH 50: CPT

## 2017-10-10 PROCEDURE — 71010: CPT | Mod: 26

## 2017-10-10 RX ORDER — ACETAMINOPHEN 500 MG
1000 TABLET ORAL ONCE
Qty: 0 | Refills: 0 | Status: DISCONTINUED | OUTPATIENT
Start: 2017-10-10 | End: 2017-10-11

## 2017-10-10 RX ORDER — ALPRAZOLAM 0.25 MG
0.25 TABLET ORAL AT BEDTIME
Qty: 0 | Refills: 0 | Status: DISCONTINUED | OUTPATIENT
Start: 2017-10-10 | End: 2017-10-11

## 2017-10-10 RX ADMIN — DIVALPROEX SODIUM 500 MILLIGRAM(S): 500 TABLET, DELAYED RELEASE ORAL at 17:57

## 2017-10-10 RX ADMIN — Medication 325 MILLIGRAM(S): at 21:12

## 2017-10-10 RX ADMIN — ALBUTEROL 2 PUFF(S): 90 AEROSOL, METERED ORAL at 09:29

## 2017-10-10 RX ADMIN — Medication 2 PUFF(S): at 09:29

## 2017-10-10 RX ADMIN — DIVALPROEX SODIUM 500 MILLIGRAM(S): 500 TABLET, DELAYED RELEASE ORAL at 06:26

## 2017-10-10 RX ADMIN — Medication 2 PUFF(S): at 17:01

## 2017-10-10 RX ADMIN — PANTOPRAZOLE SODIUM 40 MILLIGRAM(S): 20 TABLET, DELAYED RELEASE ORAL at 16:52

## 2017-10-10 RX ADMIN — VORICONAZOLE 200 MILLIGRAM(S): 10 INJECTION, POWDER, LYOPHILIZED, FOR SOLUTION INTRAVENOUS at 17:57

## 2017-10-10 RX ADMIN — ALBUTEROL 2 PUFF(S): 90 AEROSOL, METERED ORAL at 17:01

## 2017-10-10 RX ADMIN — APIXABAN 5 MILLIGRAM(S): 2.5 TABLET, FILM COATED ORAL at 06:24

## 2017-10-10 RX ADMIN — Medication 325 MILLIGRAM(S): at 14:01

## 2017-10-10 RX ADMIN — PANTOPRAZOLE SODIUM 40 MILLIGRAM(S): 20 TABLET, DELAYED RELEASE ORAL at 06:24

## 2017-10-10 RX ADMIN — Medication 0.25 MILLIGRAM(S): at 21:12

## 2017-10-10 RX ADMIN — Medication 20 MILLIGRAM(S): at 14:01

## 2017-10-10 RX ADMIN — Medication 81 MILLIGRAM(S): at 14:01

## 2017-10-10 RX ADMIN — Medication 20 MILLIGRAM(S): at 21:12

## 2017-10-10 RX ADMIN — Medication 1 MILLIGRAM(S): at 14:01

## 2017-10-10 RX ADMIN — ALBUTEROL 2 PUFF(S): 90 AEROSOL, METERED ORAL at 01:34

## 2017-10-10 RX ADMIN — ALBUTEROL 2 PUFF(S): 90 AEROSOL, METERED ORAL at 21:44

## 2017-10-10 RX ADMIN — Medication 325 MILLIGRAM(S): at 06:23

## 2017-10-10 RX ADMIN — Medication 40 MILLIGRAM(S): at 06:26

## 2017-10-10 RX ADMIN — APIXABAN 5 MILLIGRAM(S): 2.5 TABLET, FILM COATED ORAL at 17:57

## 2017-10-10 RX ADMIN — BUDESONIDE AND FORMOTEROL FUMARATE DIHYDRATE 2 PUFF(S): 160; 4.5 AEROSOL RESPIRATORY (INHALATION) at 21:44

## 2017-10-10 RX ADMIN — CHLORHEXIDINE GLUCONATE 1 APPLICATION(S): 213 SOLUTION TOPICAL at 06:24

## 2017-10-10 RX ADMIN — ALBUTEROL 2 PUFF(S): 90 AEROSOL, METERED ORAL at 04:23

## 2017-10-10 RX ADMIN — VORICONAZOLE 200 MILLIGRAM(S): 10 INJECTION, POWDER, LYOPHILIZED, FOR SOLUTION INTRAVENOUS at 06:24

## 2017-10-10 RX ADMIN — ATORVASTATIN CALCIUM 80 MILLIGRAM(S): 80 TABLET, FILM COATED ORAL at 21:12

## 2017-10-10 RX ADMIN — Medication 100 MILLIGRAM(S): at 06:23

## 2017-10-10 RX ADMIN — ALBUTEROL 2 PUFF(S): 90 AEROSOL, METERED ORAL at 13:12

## 2017-10-10 RX ADMIN — BUDESONIDE AND FORMOTEROL FUMARATE DIHYDRATE 2 PUFF(S): 160; 4.5 AEROSOL RESPIRATORY (INHALATION) at 09:29

## 2017-10-10 RX ADMIN — Medication 2 PUFF(S): at 21:46

## 2017-10-10 NOTE — PROGRESS NOTE ADULT - SUBJECTIVE AND OBJECTIVE BOX
Patient is a 47y old  Male who presents with a chief complaint of "My leg wouldn't stop shaking." (14 Sep 2017 09:59)  pt  has been doing ok today  much less wheezing  less SOB   steroids were added again yesterday secondary to increased wheezing     Any change in ROS:     MEDICATIONS  (STANDING):  ALBUTerol    90 MICROgram(s) HFA Inhaler 2 Puff(s) Inhalation every 4 hours  apixaban 5 milliGRAM(s) Oral every 12 hours  aspirin  chewable 81 milliGRAM(s) Oral daily  atorvastatin 80 milliGRAM(s) Oral at bedtime  buDESOnide 160 MICROgram(s)/formoterol 4.5 MICROgram(s) Inhaler 2 Puff(s) Inhalation every 12 hours  chlorhexidine 4% Liquid 1 Application(s) Topical daily  diVALproex  milliGRAM(s) Oral two times a day  docusate sodium 100 milliGRAM(s) Oral two times a day  ferrous    sulfate 325 milliGRAM(s) Oral every 8 hours  folic acid 1 milliGRAM(s) Oral daily  ipratropium 17 MICROgram(s) HFA Inhaler 2 Puff(s) Inhalation every 8 hours  methylPREDNISolone sodium succinate Injectable 20 milliGRAM(s) IV Push every 8 hours  pantoprazole    Tablet 40 milliGRAM(s) Oral two times a day before meals  polyethylene glycol 3350 17 Gram(s) Oral at bedtime  voriconazole 200 milliGRAM(s) Oral every 12 hours    MEDICATIONS  (PRN):  acetaminophen    Suspension 650 milliGRAM(s) Oral every 6 hours PRN For Temp greater than 38 C (100.4 F)  acetaminophen   Tablet. 650 milliGRAM(s) Oral every 4 hours PRN Mild Pain (1 - 3)  acetaminophen   Tablet. 650 milliGRAM(s) Oral every 6 hours PRN Mild Pain (1 - 3)  acetaminophen  IVPB. 1000 milliGRAM(s) IV Intermittent once PRN Moderate Pain (4 - 6)  aluminum hydroxide/magnesium hydroxide/simethicone Suspension 30 milliLiter(s) Oral every 6 hours PRN Dyspepsia  HYDROmorphone  Injectable 1 milliGRAM(s) IV Push every 4 hours PRN breakthrough pain  traMADol 25 milliGRAM(s) Oral every 4 hours PRN Moderate Pain (4 - 6)    Vital Signs Last 24 Hrs  T(C): 36.8 (10 Oct 2017 08:00), Max: 37 (09 Oct 2017 20:00)  T(F): 98.3 (10 Oct 2017 08:00), Max: 98.6 (09 Oct 2017 20:00)  HR: 92 (10 Oct 2017 13:13) (67 - 96)  BP: 113/72 (10 Oct 2017 11:00) (101/68 - 131/80)  BP(mean): 82 (10 Oct 2017 11:00) (72 - 95)  RR: 21 (10 Oct 2017 11:00) (16 - 23)  SpO2: 97% (10 Oct 2017 13:13) (96% - 100%)    I&O's Summary    09 Oct 2017 07:01  -  10 Oct 2017 07:00  --------------------------------------------------------  IN: 480 mL / OUT: 1060 mL / NET: -580 mL    10 Oct 2017 07:01  -  10 Oct 2017 13:15  --------------------------------------------------------  IN: 0 mL / OUT: 450 mL / NET: -450 mL          Physical Exam:   GENERAL: NAD, well-groomed, well-developed  HEENT: GIOVANI/   Atraumatic, Normocephalic  ENMT: No tonsillar erythema, exudates, or enlargement; Moist mucous membranes, Good dentition, No lesions  NECK: Supple, No JVD, Normal thyroid  CHEST/LUNG: not much wheezing today   CVS: Regular rate and rhythm; No murmurs, rubs, or gallops  GI: : Soft, Nontender, Nondistended; Bowel sounds present  NERVOUS SYSTEM:  Alert & Oriented X3  EXTREMITIES:  2+ Peripheral Pulses, No clubbing, cyanosis, or edema  LYMPH: No lymphadenopathy noted  SKIN: No rashes or lesions  ENDOCRINOLOGY: No Thyromegaly  PSYCH: Appropriate    Labs:                              10.1   10.64 )-----------( 169      ( 10 Oct 2017 06:30 )             31.5                         8.2    7.95  )-----------( 153      ( 09 Oct 2017 04:25 )             26.3                         8.4    8.19  )-----------( 146      ( 08 Oct 2017 06:40 )             26.3                         8.5    6.61  )-----------( 183      ( 07 Oct 2017 04:36 )             27.1                         8.8    7.44  )-----------( 179      ( 06 Oct 2017 16:30 )             28.3     10-10    142  |  99  |  14  ----------------------------<  117<H>  4.6   |  31  |  0.92  10-09    145  |  103  |  13  ----------------------------<  92  4.0   |  36<H>  |  0.84  10-08    142  |  101  |  14  ----------------------------<  89  4.3   |  34<H>  |  0.79  10-07    143  |  100  |  17  ----------------------------<  108<H>  5.1   |  33<H>  |  0.94  10-06    146<H>  |  102  |  13  ----------------------------<  89  4.4   |  35<H>  |  0.89    Ca    9.0      10 Oct 2017 06:30  Ca    8.1<L>      09 Oct 2017 04:25  Phos  2.9     10-10  Phos  2.8     10-09  Mg     1.9     10-10  Mg     2.0     10-09    TPro  5.3<L>  /  Alb  2.8<L>  /  TBili  0.2  /  DBili  x   /  AST  19  /  ALT  16  /  AlkPhos  53  10-09    CAPILLARY BLOOD GLUCOSE          LIVER FUNCTIONS - ( 09 Oct 2017 04:25 )  Alb: 2.8 g/dL / Pro: 5.3 g/dL / ALK PHOS: 53 u/L / ALT: 16 u/L / AST: 19 u/L / GGT: x           PT/INR - ( 09 Oct 2017 04:25 )   PT: 13.7 SEC;   INR: 1.22          PTT - ( 09 Oct 2017 04:25 )  PTT:27.8 SEC    Cultures:             < from: Xray Chest 1 View AP -PORTABLE-Routine (10.10.17 @ 10:08) >          IMPRESSION:  Small loculated left hydropneumothorax, not significantly   changed.    Increase in left subcutaneous emphysema.    Bilateral lung opacities and small right pleural effusion are not   significantly changed.                  KEERTHI FIELDS M.D., ATTENDING RADIOLOGIST  This document has been electronically signed. Oct 10 2017 11:44AM        < end of copied text >                  Studies  Chest X-RAY  CT SCAN Chest   Venous Dopplers: LE:   CT Abdomen  Others

## 2017-10-10 NOTE — PROGRESS NOTE ADULT - SUBJECTIVE AND OBJECTIVE BOX
47 M PMH of sarcoidosis, HTN, COPD, and asthma who was initially admitted on 09/10 w cc of severe leg spasms X two days  In E.D. pt found to be wheezing, so he was seen by Pulmonary and started on IV Steroids for sarcoidosis  On 9/12 at 4 am, a RRT and Stroke codes were called as the patient became unresponsive.  He was transferred to MICU and then intubated for airway protection.   The patient was found to have a new infarct on MRI and also found to have a patent PFO on this admission as well. He was weaned off vent on 09/13 and transferred to the floors   There was concern patient was having paroxysmal afib, so he was started on Eliquis yesterday evening. He has received 2 doses of Eliquis on 09/19.   However, on 09/20, the patient had a coughing episode, which was not unusual for him, but during this episode he began to cough up fresh blood, about 1/2 cup of blood. He underwent Bronchoscopy with bronchoalveolar lavage of both sides and epi injection on 09/21 and was to have received further IR embolization for hemoptysis control in the next few days.  However, he developed hemoptysis again and underwent urgent LULectomy, complicated with post op bleeding.    Left VATS and DAVID lobectomy 9/24/17 [POD # 16]  Re-op L thoracotomy, evacuation of hemothorax (600cc clot, 400cc blood) no obvious site of bleeding  9/25/17 [POD # 15]  Re-op L thoracotomy, evacuation of hemothorax; RTOR x2 100cc clot; 200cc blood: bleeding posterior intercostal arteries identified and cauterized 9/25/17 [POD # 15]  Pt was transferred to ICU because of increased SOB on the floor. Pt also had increased left apical pneumo for which I.R put a   Pigtail catheter. [POD # 4(574068)] (Transferred back to ICU 2/2 increased SOB on telemetry and increased left apical ptx)  Pt is currently feeling much better, on nasal canula saturating 100%    Issues:                 L PTX              Aspergilloma on Voriconazole              Sarcoidosis               PFO on Eliquis              Anemia  CVA  with L-side weakness              Pneumothorax and Air leak              S/p post op bleeding              S/P Pulm edema, possible TRALI              s/p massive transfusion       Home Medications:   * Incomplete Medication History as of 11-Sep-2017 09:23 documented in Structured Notes  · 	Azithromycin 5 Day Dose Pack 250 mg oral tablet: 1 dose(s) orally once a day for respiratory infection, Last Dose Taken:    · 	predniSONE 50 mg oral tablet: 1 tab(s) orally once a day , Last Dose Taken:    · 	albuterol 1.25 mg/3 mL (0.042%) inhalation solution: 3 milliliter(s) inhaled 3 times a day, Last Dose Taken:    · 	losartan 25 mg oral tablet: 1 tab(s) orally 2 times a day, Last Dose Taken:    · 	Symbicort 160 mcg-4.5 mcg/inh inhalation aerosol: 2 puff(s) inhaled 2 times a day, Last Dose Taken:      MEDICATIONS  (STANDING):  ALBUTerol    90 MICROgram(s) HFA Inhaler 2 Puff(s) Inhalation every 4 hours  apixaban 5 milliGRAM(s) Oral every 12 hours  aspirin  chewable 81 milliGRAM(s) Oral daily  atorvastatin 80 milliGRAM(s) Oral at bedtime  buDESOnide 160 MICROgram(s)/formoterol 4.5 MICROgram(s) Inhaler 2 Puff(s) Inhalation every 12 hours  chlorhexidine 4% Liquid 1 Application(s) Topical daily  diVALproex  milliGRAM(s) Oral two times a day  docusate sodium 100 milliGRAM(s) Oral two times a day  ferrous    sulfate 325 milliGRAM(s) Oral every 8 hours  folic acid 1 milliGRAM(s) Oral daily  ipratropium 17 MICROgram(s) HFA Inhaler 1 Puff(s) Inhalation every 6 hours  ipratropium 17 MICROgram(s) HFA Inhaler 2 Puff(s) Inhalation every 8 hours  methylPREDNISolone sodium succinate Injectable 40 milliGRAM(s) IV Push every 8 hours  pantoprazole    Tablet 40 milliGRAM(s) Oral two times a day before meals  polyethylene glycol 3350 17 Gram(s) Oral at bedtime  voriconazole 200 milliGRAM(s) Oral every 12 hours    MEDICATIONS  (PRN):  acetaminophen    Suspension 650 milliGRAM(s) Oral every 6 hours PRN For Temp greater than 38 C (100.4 F)  acetaminophen   Tablet. 650 milliGRAM(s) Oral every 4 hours PRN Mild Pain (1 - 3)  acetaminophen   Tablet. 650 milliGRAM(s) Oral every 6 hours PRN Mild Pain (1 - 3)  acetaminophen  IVPB. 1000 milliGRAM(s) IV Intermittent once PRN Moderate Pain (4 - 6)  aluminum hydroxide/magnesium hydroxide/simethicone Suspension 30 milliLiter(s) Oral every 6 hours PRN Dyspepsia  HYDROmorphone  Injectable 1 milliGRAM(s) IV Push every 4 hours PRN breakthrough pain  traMADol 25 milliGRAM(s) Oral every 4 hours PRN Moderate Pain (4 - 6)      ICU Vital Signs Last 24 Hrs  T(C): 36.9 (10 Oct 2017 04:00), Max: 37 (09 Oct 2017 20:00)  T(F): 98.4 (10 Oct 2017 04:00), Max: 98.6 (09 Oct 2017 20:00)  HR: 76 (10 Oct 2017 05:00) (67 - 102)  BP: 118/78 (10 Oct 2017 05:00) (101/68 - 136/73)  BP(mean): 88 (10 Oct 2017 05:00) (72 - 88)  ABP: --  ABP(mean): --  RR: 20 (10 Oct 2017 05:00) (14 - 23)  SpO2: 97% (10 Oct 2017 05:00) (88% - 100%)    Physical exam:                           General:               Awake, alert,                                                Neuro:                 focal,  weak   L>R  KAMLESH>LL                          Cardiovascular:    S1 & S2, regular                           Respiratory:         Air entry is decreased at left upper chest and has bilateral conducted sounds, rhonchi                           GI:                       Soft, nondistended and nontender, Bowel sounds active                            Ext:                      No cyanosis &  mild  bilateral  edema of upper ext    I&O's Summary    09 Oct 2017 07:01  -  10 Oct 2017 07:00  --------------------------------------------------------  IN: 360 mL / OUT: 1060 mL / NET: -700 mL        Labs:                                                                      8.2    7.95  )-----------( 153      ( 09 Oct 2017 04:25 )             26.3                            10-09  145  |  103  |  13  ----------------------------<  92  4.0   |  36<H>  |  0.84    Ca    8.1<L>      09 Oct 2017 04:25  Phos  2.8     10-09  Mg     2.0     10-09    TPro  5.3<L>  /  Alb  2.8<L>  /  TBili  0.2  /  DBili  x   /  AST  19  /  ALT  16  /  AlkPhos  53  10-09    LIVER FUNCTIONS - ( 09 Oct 2017 04:25 )  Alb: 2.8 g/dL / Pro: 5.3 g/dL / ALK PHOS: 53 u/L / ALT: 16 u/L / AST: 19 u/L / GGT: x                                PT/INR - ( 09 Oct 2017 04:25 )   PT: 13.7 SEC;   INR: 1.22     PTT - ( 09 Oct 2017 04:25 )  PTT:27.8 SEC          Plan:  47 M PMH of sarcoidosis, HTN, COPD, and asthma who was initially admitted on 09/10 w cc of severe leg spasms X two days  In E.D. pt found to be wheezing, so he was seen by Pulmonary and started on IV Steroids for sarcoidosis  On 9/12 at 4 am, a RRT and Stroke codes were called as the patient became unresponsive.  He was transferred to MICU and then intubated for airway protection.   The patient was found to have a new infarct on MRI and also found to have a patent PFO on this admission as well. He was weaned off vent on 09/13 and transferred to the floors   There was concern patient was having paroxysmal afib, so he was started on Eliquis yesterday evening. He has received 2 doses of Eliquis on 09/19.   However, on 09/20, the patient had a coughing episode, which was not unusual for him, but during this episode he began to cough up fresh blood, about 1/2 cup of blood. He underwent Bronchoscopy with bronchoalveolar lavage of both sides and epi injection on 09/21 and was to have received further IR embolization for hemoptysis control in the next few days.  However, he developed hemoptysis again and underwent urgent LULectomy, complicated with post op bleeding.  Left VATS and DAVID lobectomy 9/24/17 [POD # 16]  Re-op L thoracotomy, evacuation of hemothorax (600cc clot, 400cc blood) no obvious site of bleeding  9/25/17 [POD # 15]  Re-op L thoracotomy, evacuation of hemothorax; RTOR x2 100cc clot; 200cc blood: bleeding posterior intercostal arteries identified and cauterized 9/25/17 [POD # 15]  Pt was transferred to ICU because of increased SOB on the floor. Pt also had increased left apical pneumo for which I.R put a   Pigtail catheter. 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 Physical medicine evaluation.                              Cardiovascular:                                             Continue hemodynamic monitoring.    PFO - On Eliquis 5mg/bid                            Respiratory:                                                                                                                                                                                                                                                                                                                                                                                                                                                                                                                                                                                                                                                                                                                                                                                                                                                                                                      Comfortable, not in any distress.                                         Monitor chest tube output.                                         Pneumothorax with a new L pigtail cathetr & Chest tube. Both tubes  to suction, -20cm. Small air leak  from pigtail                                                              Sarcoidosis: Steroids tapered off.                                          The patient was given Hydrocortisone X1 while in respiratory distress requiring 100% Fio2, but not continu                                          Will resume steroids only if requested by Pulmonary                             GI                                         Tolerating PO                                         Continue GI prophylaxis with Protonix                                         Continue Zofran / Reglan for nausea - PRN	                                                                 Renal:                                                                                  Monitor I/Os and electrolytes                                                 Hem/ Onc:                                                                                  Monitor chest tube output     Anemia: Continue Iron / FA                            Infectious disease:    Aspergilloma - On voriconazole 200 milliGRAM(s) Oral every 12 hours.     Monitor for fever / leukocytosis.                                         All surgical incision / chest tube  sites look clean                            Endocrine                                          Continue Accu-Checks with coverage    All available pertinent clinical, laboratory, radiographic, hemodynamic, echocardiographic, respiratory data, microbiologic data and chart were reviewed and analyzed   Patient seen, examined and plan discussed with CT Surgeon / CTICU team.      Prosper Wen MD

## 2017-10-10 NOTE — PROGRESS NOTE ADULT - PROBLEM SELECTOR PLAN 6
on steroids at this time: it was given only for a short duration: thinking his wheezing is secondary to rhinovirus infection: It will cont till tomorrow: thereafter would taper it down to off in next 2-3  10/1: change prednisone to 10 mg daily  10/02: cont prednisone  10/03: trop prednisone after today's dose  10/4 stop prednisone tomorrow  10/5: dc prednisone today : pt was not on any treatment for sarcoidosis before  10/06: OFF PREDNISONE NOW  10/7: start prednisone 20 mg per day  10/09: started back on IV steroids given his increased wheezing!!  10/10: wheezing better: decrease steroids to 20 mg q 8

## 2017-10-10 NOTE — PROGRESS NOTE ADULT - PROBLEM SELECTOR PLAN 3
wheezing is present :pk pressures in 30's : started him on steroids again  bronchodilators ATC:  9/27; if he continues to show improvement: can decrease his solumedrol tomorrow  9/28: decreases his IV steroids to 20 mg once a day for a few days more  9/29: on 20 mg per day of solumedrol: would continue for few more days and then decrease it  9/30: doing ok: - wheezing: decrease his steroids to 10 mg a day from tomorrow  10/1: change to po prednisone 10 mg  10/02; cont po prednisone  10/3: stop prednisone after today,s dose?  10/4 : dc prednisone after tomorrow dose  10/5: DC prednisone today  10/6: OF PREDNISONE NOW  10/07: has sarcoidosis too: SOB with poor resp excursion: would start him on 20 mg of prednisone  10/8: cont prednisone with BD  10/09: his wheezing has increased today: Would change to IV METHYLPREDNISOLONE: 20 MG Q 8 HOURS  1/10was on 40 mg three times a day: dw icu attending , to decrease it to 20 q 8 hours

## 2017-10-10 NOTE — PROGRESS NOTE ADULT - PROBLEM SELECTOR PLAN 1
S/P surgery : reop twice secondary to hemothroax: curently on pressors and ventilator support: He is wheezing extensively: started on IV steroids 20 mg twice a day , but should be given only for few days : follow his wheezing daily as soon as it is resolved; dc steroids For now their is no hemoptysis: He had twice reop yesterday secondary to hemothorax and bleeding from chest tube: currently on pressors and seated and on broad spectrum antibiotics.  9/27: he seems to be doing better today: for extubation  9/28: extubated: alert and awake and responding: He is on zosyn any way with voriconazole!  9/29: chest x-ray today , to me looks better then yesterday: There is no official report yet:  10/02: doing ok  10/3: on eliquis: toelrating well: no hemoptysis  10/4 : stable  10/05: persistent left upper air space following DAVID lobectomy: defer to thoracic surgery  10/06: stable?: no HEMOPTYSIS  10/7: resolved  10/8: cont prednisone: pneumothorax has resolved  10/09: resolved  10/10: no more hemoptysis

## 2017-10-11 LAB
BUN SERPL-MCNC: 16 MG/DL — SIGNIFICANT CHANGE UP (ref 7–23)
CALCIUM SERPL-MCNC: 8.8 MG/DL — SIGNIFICANT CHANGE UP (ref 8.4–10.5)
CHLORIDE SERPL-SCNC: 102 MMOL/L — SIGNIFICANT CHANGE UP (ref 98–107)
CO2 SERPL-SCNC: 32 MMOL/L — HIGH (ref 22–31)
CREAT SERPL-MCNC: 0.85 MG/DL — SIGNIFICANT CHANGE UP (ref 0.5–1.3)
GLUCOSE SERPL-MCNC: 161 MG/DL — HIGH (ref 70–99)
HCT VFR BLD CALC: 30.2 % — LOW (ref 39–50)
HGB BLD-MCNC: 9.6 G/DL — LOW (ref 13–17)
MAGNESIUM SERPL-MCNC: 2 MG/DL — SIGNIFICANT CHANGE UP (ref 1.6–2.6)
MCHC RBC-ENTMCNC: 30.8 PG — SIGNIFICANT CHANGE UP (ref 27–34)
MCHC RBC-ENTMCNC: 31.8 % — LOW (ref 32–36)
MCV RBC AUTO: 96.8 FL — SIGNIFICANT CHANGE UP (ref 80–100)
NRBC # FLD: 0 — SIGNIFICANT CHANGE UP
PHOSPHATE SERPL-MCNC: 3.1 MG/DL — SIGNIFICANT CHANGE UP (ref 2.5–4.5)
PLATELET # BLD AUTO: 153 K/UL — SIGNIFICANT CHANGE UP (ref 150–400)
PMV BLD: 13.9 FL — HIGH (ref 7–13)
POTASSIUM SERPL-MCNC: 4.3 MMOL/L — SIGNIFICANT CHANGE UP (ref 3.5–5.3)
POTASSIUM SERPL-SCNC: 4.3 MMOL/L — SIGNIFICANT CHANGE UP (ref 3.5–5.3)
RBC # BLD: 3.12 M/UL — LOW (ref 4.2–5.8)
RBC # FLD: 17.1 % — HIGH (ref 10.3–14.5)
SODIUM SERPL-SCNC: 144 MMOL/L — SIGNIFICANT CHANGE UP (ref 135–145)
WBC # BLD: 16.79 K/UL — HIGH (ref 3.8–10.5)
WBC # FLD AUTO: 16.79 K/UL — HIGH (ref 3.8–10.5)

## 2017-10-11 PROCEDURE — 71010: CPT | Mod: 26

## 2017-10-11 PROCEDURE — 99233 SBSQ HOSP IP/OBS HIGH 50: CPT

## 2017-10-11 PROCEDURE — 71010: CPT | Mod: 26,77

## 2017-10-11 RX ORDER — ALPRAZOLAM 0.25 MG
0.25 TABLET ORAL THREE TIMES A DAY
Qty: 0 | Refills: 0 | Status: DISCONTINUED | OUTPATIENT
Start: 2017-10-11 | End: 2017-10-18

## 2017-10-11 RX ORDER — ACETAMINOPHEN 500 MG
1000 TABLET ORAL ONCE
Qty: 0 | Refills: 0 | Status: COMPLETED | OUTPATIENT
Start: 2017-10-11 | End: 2017-10-12

## 2017-10-11 RX ADMIN — Medication 325 MILLIGRAM(S): at 06:16

## 2017-10-11 RX ADMIN — Medication 325 MILLIGRAM(S): at 22:23

## 2017-10-11 RX ADMIN — CHLORHEXIDINE GLUCONATE 1 APPLICATION(S): 213 SOLUTION TOPICAL at 06:17

## 2017-10-11 RX ADMIN — BUDESONIDE AND FORMOTEROL FUMARATE DIHYDRATE 2 PUFF(S): 160; 4.5 AEROSOL RESPIRATORY (INHALATION) at 22:07

## 2017-10-11 RX ADMIN — PANTOPRAZOLE SODIUM 40 MILLIGRAM(S): 20 TABLET, DELAYED RELEASE ORAL at 16:54

## 2017-10-11 RX ADMIN — ALBUTEROL 2 PUFF(S): 90 AEROSOL, METERED ORAL at 14:05

## 2017-10-11 RX ADMIN — Medication 1 MILLIGRAM(S): at 12:15

## 2017-10-11 RX ADMIN — APIXABAN 5 MILLIGRAM(S): 2.5 TABLET, FILM COATED ORAL at 17:43

## 2017-10-11 RX ADMIN — ALBUTEROL 2 PUFF(S): 90 AEROSOL, METERED ORAL at 04:40

## 2017-10-11 RX ADMIN — Medication 81 MILLIGRAM(S): at 12:15

## 2017-10-11 RX ADMIN — BUDESONIDE AND FORMOTEROL FUMARATE DIHYDRATE 2 PUFF(S): 160; 4.5 AEROSOL RESPIRATORY (INHALATION) at 09:44

## 2017-10-11 RX ADMIN — Medication 325 MILLIGRAM(S): at 14:35

## 2017-10-11 RX ADMIN — Medication 2 PUFF(S): at 22:01

## 2017-10-11 RX ADMIN — ALBUTEROL 2 PUFF(S): 90 AEROSOL, METERED ORAL at 09:44

## 2017-10-11 RX ADMIN — ALBUTEROL 2 PUFF(S): 90 AEROSOL, METERED ORAL at 16:20

## 2017-10-11 RX ADMIN — Medication 0.25 MILLIGRAM(S): at 14:36

## 2017-10-11 RX ADMIN — ALBUTEROL 2 PUFF(S): 90 AEROSOL, METERED ORAL at 01:24

## 2017-10-11 RX ADMIN — Medication 20 MILLIGRAM(S): at 06:16

## 2017-10-11 RX ADMIN — Medication 20 MILLIGRAM(S): at 14:35

## 2017-10-11 RX ADMIN — APIXABAN 5 MILLIGRAM(S): 2.5 TABLET, FILM COATED ORAL at 06:16

## 2017-10-11 RX ADMIN — Medication 2 PUFF(S): at 14:05

## 2017-10-11 RX ADMIN — VORICONAZOLE 200 MILLIGRAM(S): 10 INJECTION, POWDER, LYOPHILIZED, FOR SOLUTION INTRAVENOUS at 06:16

## 2017-10-11 RX ADMIN — VORICONAZOLE 200 MILLIGRAM(S): 10 INJECTION, POWDER, LYOPHILIZED, FOR SOLUTION INTRAVENOUS at 17:43

## 2017-10-11 RX ADMIN — Medication 2 PUFF(S): at 07:36

## 2017-10-11 RX ADMIN — DIVALPROEX SODIUM 500 MILLIGRAM(S): 500 TABLET, DELAYED RELEASE ORAL at 17:43

## 2017-10-11 RX ADMIN — DIVALPROEX SODIUM 500 MILLIGRAM(S): 500 TABLET, DELAYED RELEASE ORAL at 06:16

## 2017-10-11 RX ADMIN — ATORVASTATIN CALCIUM 80 MILLIGRAM(S): 80 TABLET, FILM COATED ORAL at 22:23

## 2017-10-11 RX ADMIN — PANTOPRAZOLE SODIUM 40 MILLIGRAM(S): 20 TABLET, DELAYED RELEASE ORAL at 06:16

## 2017-10-11 RX ADMIN — Medication 100 MILLIGRAM(S): at 17:43

## 2017-10-11 RX ADMIN — ALBUTEROL 2 PUFF(S): 90 AEROSOL, METERED ORAL at 22:01

## 2017-10-11 NOTE — PROGRESS NOTE ADULT - PROBLEM SELECTOR PLAN 1
S/P surgery : reop twice secondary to hemothroax: currently on pressors and ventilator support: He is wheezing extensively: started on IV steroids 20 mg twice a day , but should be given only for few days : follow his wheezing daily as soon as it is resolved; dc steroids For now their is no hemoptysis: He had twice reop yesterday secondary to hemothorax and bleeding from chest tube: currently on pressors and seated and on broad spectrum antibiotics.  9/27: he seems to be doing better today: for extubation  9/28: extubated: alert and awake and responding: He is on zosyn any way with voriconazole!  9/29: chest x-ray today , to me looks better then yesterday: There is no official report yet:  10/02: doing ok  10/3: on eliquis: toelrating well: no hemoptysis  10/4 : stable  10/05: persistent left upper air space following DAVID lobectomy: defer to thoracic surgery  10/06: stable?: no HEMOPTYSIS  10/7: resolved  10/8: cont prednisone: pneumothorax has resolved  10/09: resolved  10/10: no more hemoptysis  1/11: resolved after surgery and despite eliquis: stable

## 2017-10-11 NOTE — PROGRESS NOTE ADULT - SUBJECTIVE AND OBJECTIVE BOX
OSCAR CHACKO            MRN-[id number]         No Known Allergies                 47 M PMH of sarcoidosis, HTN, COPD, and asthma who was initially admitted on 09/10 w cc of severe leg spasms X two days  In E.D. pt found to be wheezing, so he was seen by Pulmonary and started on IV Steroids for sarcoidosis  On 9/12 at 4 am, a RRT and Stroke codes were called as the patient became unresponsive.  He was transferred to MICU and then intubated for airway protection.   The patient was found to have a new infarct on MRI and also found to have a patent PFO on this admission as well. He was weaned off vent on 09/13 and transferred to the floors   There was concern patient was having paroxysmal afib, so he was started on Eliquis yesterday evening. He has received 2 doses of Eliquis on 09/19.   However, on 09/20, the patient had a coughing episode, which was not unusual for him, but during this episode he began to cough up fresh blood, about 1/2 cup of blood. He underwent Bronchoscopy with bronchoalveolar lavage of both sides and epi injection on 09/21 and was to have received further IR embolization for hemoptysis control in the next few days.  However, he developed hemoptysis again and underwent urgent LULectomy, complicated with post op bleeding.    Left VATS and DAVID lobectomy 9/24/17  Re-op L thoracotomy, evacuation of hemothorax (600cc clot, 400cc blood) no obvious site of bleeding  8/25/17  Re-op L thoracotomy, evacuation of hemothorax; RTOR x2 100cc clot; 200cc blood: bleeding posterior intercostal arteries identified and cauterized 9/25/17    Pt was transferred to ICU because of increased SOB on the floor. Pt also had increased left apical pneumo for which I.R put a pigtail catheter. Pt is currently feeling much better, on nasal canula saturating 100%    10/11/17 Pt is OOB in chair, comfortable, on NC saturating 100%.    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  COPD (chronic obstructive pulmonary disease)  Asthma  Hypertension  Sarcoidosis  No significant past surgical history        ICU Vital Signs Last 24 Hrs  T(C): 36.9 (11 Oct 2017 08:00), Max: 37 (10 Oct 2017 16:00)  T(F): 98.5 (11 Oct 2017 08:00), Max: 98.6 (10 Oct 2017 16:00)  HR: 70 (11 Oct 2017 09:44) (65 - 108)  BP: 121/74 (11 Oct 2017 09:00) (101/65 - 131/80)  BP(mean): 85 (11 Oct 2017 09:00) (74 - 939)  ABP: --  ABP(mean): --  RR: 18 (11 Oct 2017 09:00) (17 - 25)  SpO2: 100% (11 Oct 2017 09:44) (95% - 100%)    I&O's Summary    10 Oct 2017 07:01  -  11 Oct 2017 07:00  --------------------------------------------------------  IN: 60 mL / OUT: 1365 mL / NET: -1305 mL    11 Oct 2017 07:01  -  11 Oct 2017 09:53  --------------------------------------------------------  IN: 0 mL / OUT: 20 mL / NET: -20 mL      CAPILLARY BLOOD GLUCOSE          MEDICATIONS  (STANDING):  ALBUTerol    90 MICROgram(s) HFA Inhaler 2 Puff(s) Inhalation every 4 hours  apixaban 5 milliGRAM(s) Oral every 12 hours  aspirin  chewable 81 milliGRAM(s) Oral daily  atorvastatin 80 milliGRAM(s) Oral at bedtime  buDESOnide 160 MICROgram(s)/formoterol 4.5 MICROgram(s) Inhaler 2 Puff(s) Inhalation every 12 hours  chlorhexidine 4% Liquid 1 Application(s) Topical daily  diVALproex  milliGRAM(s) Oral two times a day  docusate sodium 100 milliGRAM(s) Oral two times a day  ferrous    sulfate 325 milliGRAM(s) Oral every 8 hours  folic acid 1 milliGRAM(s) Oral daily  ipratropium 17 MICROgram(s) HFA Inhaler 2 Puff(s) Inhalation every 8 hours  methylPREDNISolone sodium succinate Injectable 20 milliGRAM(s) IV Push every 8 hours  pantoprazole    Tablet 40 milliGRAM(s) Oral two times a day before meals  polyethylene glycol 3350 17 Gram(s) Oral at bedtime  voriconazole 200 milliGRAM(s) Oral every 12 hours    MEDICATIONS  (PRN):  acetaminophen    Suspension 650 milliGRAM(s) Oral every 6 hours PRN For Temp greater than 38 C (100.4 F)  acetaminophen   Tablet. 650 milliGRAM(s) Oral every 4 hours PRN Mild Pain (1 - 3)  acetaminophen   Tablet. 650 milliGRAM(s) Oral every 6 hours PRN Mild Pain (1 - 3)  acetaminophen  IVPB. 1000 milliGRAM(s) IV Intermittent once PRN Moderate Pain (4 - 6)  acetaminophen  IVPB. 1000 milliGRAM(s) IV Intermittent once PRN Moderate Pain (4 - 6)  ALPRAZolam 0.25 milliGRAM(s) Oral at bedtime PRN sleep  aluminum hydroxide/magnesium hydroxide/simethicone Suspension 30 milliLiter(s) Oral every 6 hours PRN Dyspepsia  HYDROmorphone  Injectable 1 milliGRAM(s) IV Push every 4 hours PRN breakthrough pain  traMADol 25 milliGRAM(s) Oral every 4 hours PRN Moderate Pain (4 - 6)      Physical exam:     General:              Pt is awake, alert,                                                Neuro:                 focal,  weak   L>R  KAMLESH>LL                          Cardiovascular:    S1 & S2, regular                           Respiratory:         Air entry is fairt and has bilateral conducted sounds, rhonchi                           GI:                          Soft, nondistended and nontender, Bowel sounds active                            Ext:                        No cyanosis &  mild  bilateral  edema of upper ext    Labs:                                                                           9.6    16.79 )-----------( 153      ( 11 Oct 2017 04:45 )             30.2             10-11    144  |  102  |  16  ----------------------------<  161<H>  4.3   |  32<H>  |  0.85    Ca    8.8      11 Oct 2017 04:45  Phos  3.1     10-11  Mg     2.0     10-11                            CXR:  < from: Xray Chest 1 View AP -PORTABLE-Routine (10.10.17 @ 10:08) >  Small loculated left hydropneumothorax, not significantly   changed.    Increase in left subcutaneous emphysema.    Bilateral lung opacities and small right pleural effusion are not   significantly changed.        Plan:    47 M  s/p Left VATS and DVAID lobectomy on 9/24/17, RTOR for bleeding twice.  Pt was transferred to ICU because of increased SOB on the floor. Pt also had increased left apical pneumo for which I.R put a pigtail catheter. Pt is currently feeling much better, on nasal canula. Restarted on Solumedrol for wheezing.                              Neuro:                                         Pain control with Tylenol IV / PO    CVA - Continue PT. Physical medicine evaluation.              Continue                             Cardiovascular:                                             Continue hemodynamic monitoring.    PFO - On Eliquis 5mg/bid                            Respiratory:                                                                                                                                                                                                                                                                                                                                                                                                                                                                                                                                                                                                                                                                                                                                                                                                                                                                                                      Comfortable, not in any distress.                                         Monitor chest tube output.                                         Pneumothorax: Has new pigtail cathetr & Chest tube. Both tubes  to suction, -20cm. Small air leak  from pigtail                                                              Sarcoidosis: Steroids tapered off yesterday. Since pt was in respiratory distress requiring 100% Fio2, Hydrocortisone IV was given.   D/W Pulmonary regarding continuing steroids.                          GI                                         Tolerating PO                                         Continue GI prophylaxis with Protonix                                         Continue Zofran / Reglan for nausea - PRN	                                                                 Renal:                                                                                  Monitor I/Os and electrolytes                                                 Hem/ Onc:                                                                                  Monitor chest tube output     Anemia: Continue Iron / FA                            Infectious disease:    Aspergilloma - On voriconazole 200 milliGRAM(s) Oral every 12 hours.     Monitor for fever / leukocytosis.                                         All surgical incision / chest tube  sites look clean                            Endocrine                                          Continue Accu-Checks with coverage    All available pertinent clinical, laboratory, radiographic, hemodynamic, echocardiographic, respiratory data, microbiologic data and chart were reviewed and analyzed   Patient seen, examined and plan discussed with CT Surgeon Dr. Guerra / CTICU team..                  I have spent     minutes of critical care time with this pt between   am/pm and   am/ pm        Len Kim MD OSCAR CHACKO            [id number]         No Known Allergies                 47 M PMH of sarcoidosis, HTN, COPD, and asthma who was initially admitted on 09/10 w cc of severe leg spasms X two days  In E.D. pt found to be wheezing, so he was seen by Pulmonary and started on IV Steroids for sarcoidosis  On 9/12 at 4 am, a RRT and Stroke codes were called as the patient became unresponsive.  He was transferred to MICU and then intubated for airway protection.   The patient was found to have a new infarct on MRI and also found to have a patent PFO on this admission as well. He was weaned off vent on 09/13 and transferred to the floors   There was concern patient was having paroxysmal afib, so he was started on Eliquis yesterday evening. He has received 2 doses of Eliquis on 09/19.   However, on 09/20, the patient had a coughing episode, which was not unusual for him, but during this episode he began to cough up fresh blood, about 1/2 cup of blood. He underwent Bronchoscopy with bronchoalveolar lavage of both sides and epi injection on 09/21 and was to have received further IR embolization for hemoptysis control in the next few days.  However, he developed hemoptysis again and underwent urgent LULectomy, complicated with post op bleeding.    Left VATS and DAVID lobectomy 9/24/17  Re-op L thoracotomy, evacuation of hemothorax (600cc clot, 400cc blood) no obvious site of bleeding  8/25/17  Re-op L thoracotomy, evacuation of hemothorax; RTOR x2 100cc clot; 200cc blood: bleeding posterior intercostal arteries identified and cauterized 9/25/17    Pt was transferred to ICU because of increased SOB on the floor. Pt also had increased left apical pneumo for which I.R put a pigtail catheter. Pt is currently feeling much better, on nasal canula saturating 100%    10/11/17 Pt is OOB in chair, comfortable, on NC saturating 100%.    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  COPD (chronic obstructive pulmonary disease)  Asthma  Hypertension  Sarcoidosis  No significant past surgical history        ICU Vital Signs Last 24 Hrs  T(C): 36.9 (11 Oct 2017 08:00), Max: 37 (10 Oct 2017 16:00)  T(F): 98.5 (11 Oct 2017 08:00), Max: 98.6 (10 Oct 2017 16:00)  HR: 70 (11 Oct 2017 09:44) (65 - 108)  BP: 121/74 (11 Oct 2017 09:00) (101/65 - 131/80)  BP(mean): 85 (11 Oct 2017 09:00) (74 - 939)  ABP: --  ABP(mean): --  RR: 18 (11 Oct 2017 09:00) (17 - 25)  SpO2: 100% (11 Oct 2017 09:44) (95% - 100%)    I&O's Summary    10 Oct 2017 07:01  -  11 Oct 2017 07:00  --------------------------------------------------------  IN: 60 mL / OUT: 1365 mL / NET: -1305 mL    11 Oct 2017 07:01  -  11 Oct 2017 09:53  --------------------------------------------------------  IN: 0 mL / OUT: 20 mL / NET: -20 mL      CAPILLARY BLOOD GLUCOSE      MEDICATIONS  (STANDING):  ALBUTerol    90 MICROgram(s) HFA Inhaler 2 Puff(s) Inhalation every 4 hours  apixaban 5 milliGRAM(s) Oral every 12 hours  aspirin  chewable 81 milliGRAM(s) Oral daily  atorvastatin 80 milliGRAM(s) Oral at bedtime  buDESOnide 160 MICROgram(s)/formoterol 4.5 MICROgram(s) Inhaler 2 Puff(s) Inhalation every 12 hours  chlorhexidine 4% Liquid 1 Application(s) Topical daily  diVALproex  milliGRAM(s) Oral two times a day  docusate sodium 100 milliGRAM(s) Oral two times a day  ferrous    sulfate 325 milliGRAM(s) Oral every 8 hours  folic acid 1 milliGRAM(s) Oral daily  ipratropium 17 MICROgram(s) HFA Inhaler 2 Puff(s) Inhalation every 8 hours  methylPREDNISolone sodium succinate Injectable 20 milliGRAM(s) IV Push every 8 hours  pantoprazole    Tablet 40 milliGRAM(s) Oral two times a day before meals  polyethylene glycol 3350 17 Gram(s) Oral at bedtime  voriconazole 200 milliGRAM(s) Oral every 12 hours    MEDICATIONS  (PRN):  acetaminophen    Suspension 650 milliGRAM(s) Oral every 6 hours PRN For Temp greater than 38 C (100.4 F)  acetaminophen   Tablet. 650 milliGRAM(s) Oral every 4 hours PRN Mild Pain (1 - 3)  acetaminophen   Tablet. 650 milliGRAM(s) Oral every 6 hours PRN Mild Pain (1 - 3)  acetaminophen  IVPB. 1000 milliGRAM(s) IV Intermittent once PRN Moderate Pain (4 - 6)  acetaminophen  IVPB. 1000 milliGRAM(s) IV Intermittent once PRN Moderate Pain (4 - 6)  ALPRAZolam 0.25 milliGRAM(s) Oral at bedtime PRN sleep  aluminum hydroxide/magnesium hydroxide/simethicone Suspension 30 milliLiter(s) Oral every 6 hours PRN Dyspepsia  HYDROmorphone  Injectable 1 milliGRAM(s) IV Push every 4 hours PRN breakthrough pain  traMADol 25 milliGRAM(s) Oral every 4 hours PRN Moderate Pain (4 - 6)      Physical exam:     General:              Pt is awake, alert,                                                Neuro:                 focal,  weak   L>R  KAMLESH>LL                          Cardiovascular:    S1 & S2, regular                           Respiratory:         Air entry is fairt and has bilateral conducted sounds, rhonchi                           GI:                       Soft, nondistended and nontender, Bowel sounds active                            Ext:                      No cyanosis &  mild  bilateral  edema of upper ext    Labs:                                                                           9.6    16.79 )-----------( 153      ( 11 Oct 2017 04:45 )             30.2             10-11    144  |  102  |  16  ----------------------------<  161<H>  4.3   |  32<H>  |  0.85    Ca    8.8      11 Oct 2017 04:45  Phos  3.1     10-11  Mg     2.0     10-11      CXR:  < from: Xray Chest 1 View AP -PORTABLE-Routine (10.10.17 @ 10:08) >  Small loculated left hydropneumothorax, not significantly   changed.    Increase in left subcutaneous emphysema.    Bilateral lung opacities and small right pleural effusion are not   significantly changed.        Plan:    47 M  s/p Left VATS and DAVID lobectomy on 9/24/17, RTOR for bleeding twice.  Pt was transferred to ICU because of increased SOB on the floor. Pt also had increased left apical pneumo for which I.R put a pigtail catheter. Pt is currently feeling much better, on nasal canula. Restarted on Solumedrol for wheezing.                              Neuro:                                         Pain control with Tylenol IV / PO    CVA - Continue PT. Physical medicine evaluation.              Continue diVALproex  milliGRAM(s) Oral two times a day                            Cardiovascular:                                             Continue hemodynamic monitoring.    PFO - On Eliquis 5mg/bid                            Respiratory:                                                                                                                                                                                                                                                                                                                                                                                                                                                                                                                                                                                                                                                                                                                                                                                                                                                                                                      Comfortable, not in any distress.                                         Monitor chest tube output.                                         Pneumothorax: Pigtail catheter  to water seal, D/C Chest tube today                    COPD /Asthma: On Solumedrol 20mg Q8hrs  for wheezing                          GI                                         Tolerating PO                                         Continue GI prophylaxis with Protonix                                         Continue Zofran / Reglan for nausea - PRN	                                                                 Renal:                                                                                  Monitor I/Os and electrolytes                                                 Hem/ Onc:                                                                                  Monitor chest tube output     Anemia: Continue Iron / FA                            Infectious disease:    Aspergilloma - On voriconazole 200 milliGRAM(s) Oral every 12 hours.     Monitor for fever / leukocytosis.                                         All surgical incision / chest tube  sites look clean                            Endocrine                                          Continue Accu-Checks with coverage    All available pertinent clinical, laboratory, radiographic, hemodynamic, echocardiographic, respiratory data, microbiologic data and chart were reviewed and analyzed   Patient seen, examined and plan discussed with CT Surgeon Dr. Caldera / CTICU team..            Len Kim MD

## 2017-10-11 NOTE — PROGRESS NOTE ADULT - PROBLEM SELECTOR PLAN 3
wheezing is present :pk pressures in 30's : started him on steroids again  bronchodilators ATC:  9/27; if he continues to show improvement: can decrease his solumedrol tomorrow  9/28: decreases his IV steroids to 20 mg once a day for a few days more  9/29: on 20 mg per day of solumedrol: would continue for few more days and then decrease it  9/30: doing ok: - wheezing: decrease his steroids to 10 mg a day from tomorrow  10/1: change to po prednisone 10 mg  10/02; cont po prednisone  10/3: stop prednisone after today,s dose?  10/4 : dc prednisone after tomorrow dose  10/5: DC prednisone today  10/6: OF PREDNISONE NOW  10/07: has sarcoidosis too: SOB with poor resp excursion: would start him on 20 mg of prednisone  10/8: cont prednisone with BD  10/09: his wheezing has increased today: Would change to IV METHYLPREDNISOLONE: 20 MG Q 8 HOURS  1/10was on 40 mg three times a day: dw icu attending , to decrease it to 20 q 8 hours  11/10: would change to po prednisone 40 mg daily

## 2017-10-11 NOTE — PROGRESS NOTE ADULT - SUBJECTIVE AND OBJECTIVE BOX
Patient is a 47y old  Male who presents with a chief complaint of "My leg wouldn't stop shaking." (14 Sep 2017 09:59)    off oxygen  says his wheezing has been better   Any cange in ROS:     MEDICATIONS  (STANDING):  ALBUTerol    90 MICROgram(s) HFA Inhaler 2 Puff(s) Inhalation every 4 hours  apixaban 5 milliGRAM(s) Oral every 12 hours  aspirin  chewable 81 milliGRAM(s) Oral daily  atorvastatin 80 milliGRAM(s) Oral at bedtime  buDESOnide 160 MICROgram(s)/formoterol 4.5 MICROgram(s) Inhaler 2 Puff(s) Inhalation every 12 hours  chlorhexidine 4% Liquid 1 Application(s) Topical daily  diVALproex  milliGRAM(s) Oral two times a day  docusate sodium 100 milliGRAM(s) Oral two times a day  ferrous    sulfate 325 milliGRAM(s) Oral every 8 hours  folic acid 1 milliGRAM(s) Oral daily  ipratropium 17 MICROgram(s) HFA Inhaler 2 Puff(s) Inhalation every 8 hours  methylPREDNISolone sodium succinate Injectable 20 milliGRAM(s) IV Push every 8 hours  pantoprazole    Tablet 40 milliGRAM(s) Oral two times a day before meals  polyethylene glycol 3350 17 Gram(s) Oral at bedtime  voriconazole 200 milliGRAM(s) Oral every 12 hours    MEDICATIONS  (PRN):  acetaminophen    Suspension 650 milliGRAM(s) Oral every 6 hours PRN For Temp greater than 38 C (100.4 F)  acetaminophen   Tablet. 650 milliGRAM(s) Oral every 4 hours PRN Mild Pain (1 - 3)  acetaminophen   Tablet. 650 milliGRAM(s) Oral every 6 hours PRN Mild Pain (1 - 3)  acetaminophen  IVPB. 1000 milliGRAM(s) IV Intermittent once PRN Moderate Pain (4 - 6)  acetaminophen  IVPB. 1000 milliGRAM(s) IV Intermittent once PRN Moderate Pain (4 - 6)  ALPRAZolam 0.25 milliGRAM(s) Oral at bedtime PRN sleep  aluminum hydroxide/magnesium hydroxide/simethicone Suspension 30 milliLiter(s) Oral every 6 hours PRN Dyspepsia  HYDROmorphone  Injectable 1 milliGRAM(s) IV Push every 4 hours PRN breakthrough pain  traMADol 25 milliGRAM(s) Oral every 4 hours PRN Moderate Pain (4 - 6)    Vital Signs Last 24 Hrs  T(C): 36.9 (11 Oct 2017 08:00), Max: 37 (10 Oct 2017 16:00)  T(F): 98.5 (11 Oct 2017 08:00), Max: 98.6 (10 Oct 2017 16:00)  HR: 86 (11 Oct 2017 11:00) (65 - 107)  BP: 119/80 (11 Oct 2017 11:00) (96/35 - 129/88)  BP(mean): 89 (11 Oct 2017 11:00) (47 - 939)  RR: 17 (11 Oct 2017 11:00) (17 - 25)  SpO2: 97% (11 Oct 2017 11:00) (95% - 100%)    I&O's Summary    10 Oct 2017 07:01  -  11 Oct 2017 07:00  --------------------------------------------------------  IN: 60 mL / OUT: 1365 mL / NET: -1305 mL    11 Oct 2017 07:01  -  11 Oct 2017 12:26  --------------------------------------------------------  IN: 160 mL / OUT: 230 mL / NET: -70 mL          Physical Exam:   GENERAL: NAD, well-groomed, well-developed  HEENT: GIOVANI/   Atraumatic, Normocephalic  ENMT: No tonsillar erythema, exudates, or enlargement; Moist mucous membranes, Good dentition, No lesions  NECK: Supple, No JVD, Normal thyroid  CHEST/LUNG: not much of wheezing  CVS: Regular rate and rhythm; No murmurs, rubs, or gallops  GI: : Soft, Nontender, Nondistended; Bowel sounds present  NERVOUS SYSTEM:  Alert & Oriented X3  EXTREMITIES:  2+ Peripheral Pulses, No clubbing, cyanosis, or edema  LYMPH: No lymphadenopathy noted  SKIN: No rashes or lesions  ENDOCRINOLOGY: No Thyromegaly  PSYCH: Appropriate    Labs:                              9.6    16.79 )-----------( 153      ( 11 Oct 2017 04:45 )             30.2                         10.1   10.64 )-----------( 169      ( 10 Oct 2017 06:30 )             31.5                         8.2    7.95  )-----------( 153      ( 09 Oct 2017 04:25 )             26.3                         8.4    8.19  )-----------( 146      ( 08 Oct 2017 06:40 )             26.3     10-11    144  |  102  |  16  ----------------------------<  161<H>  4.3   |  32<H>  |  0.85  10-10    142  |  99  |  14  ----------------------------<  117<H>  4.6   |  31  |  0.92  10-09    145  |  103  |  13  ----------------------------<  92  4.0   |  36<H>  |  0.84  10-08    142  |  101  |  14  ----------------------------<  89  4.3   |  34<H>  |  0.79    Ca    8.8      11 Oct 2017 04:45  Ca    9.0      10 Oct 2017 06:30  Phos  3.1     10-11  Phos  2.9     10-10  Mg     2.0     10-11  Mg     1.9     10-10    TPro  5.3<L>  /  Alb  2.8<L>  /  TBili  0.2  /  DBili  x   /  AST  19  /  ALT  16  /  AlkPhos  53  10-09    CAPILLARY BLOOD GLUCOSE                Cultures:         < from: Xray Chest 1 View AP -PORTABLE-Routine (10.10.17 @ 10:08) >  INTERPRETATION:     The heart is not enlarged.  Left chest tube and pleural pigtail catheter unchanged in position.  A small loculated left hydropneumothorax is not significantly changed.  There is increase in the amount of left subcutaneous emphysema.  Bilateral lung opacities and a small right pleural effusion are not   significantly changed.            IMPRESSION:  Small loculated left hydropneumothorax, not significantly   changed.    Increase in left subcutaneous emphysema.    Bilateral lung opacities and small right pleural effusion are not   significantly changed.                  KEERTHI FIELDS M.D., ATTENDING RADIOLOGIST  This document has been electronically signed. Oct 10 2017 11:44AM        < end of copied text >                      Studies  Chest X-RAY  CT SCAN Chest   Venous Dopplers: LE:   CT Abdomen  Others

## 2017-10-11 NOTE — PROGRESS NOTE ADULT - PROBLEM SELECTOR PLAN 6
on steroids at this time: it was given only for a short duration: thinking his wheezing is secondary to rhinovirus infection: It will cont till tomorrow: thereafter would taper it down to off in next 2-3  10/1: change prednisone to 10 mg daily  10/02: cont prednisone  10/03: trop prednisone after today's dose  10/4 stop prednisone tomorrow  10/5: dc prednisone today : pt was not on any treatment for sarcoidosis before  10/06: OFF PREDNISONE NOW  10/7: start prednisone 20 mg per day  10/09: started back on IV steroids given his increased wheezing!!  10/10: wheezing better: decrease steroids to 20 mg q 8  10//: decrease and change prednisone to 40 mg daily: probably will be discharged on some prednisone

## 2017-10-12 LAB
BUN SERPL-MCNC: 20 MG/DL — SIGNIFICANT CHANGE UP (ref 7–23)
CALCIUM SERPL-MCNC: 8 MG/DL — LOW (ref 8.4–10.5)
CHLORIDE SERPL-SCNC: 105 MMOL/L — SIGNIFICANT CHANGE UP (ref 98–107)
CO2 SERPL-SCNC: 26 MMOL/L — SIGNIFICANT CHANGE UP (ref 22–31)
CREAT SERPL-MCNC: 0.94 MG/DL — SIGNIFICANT CHANGE UP (ref 0.5–1.3)
GLUCOSE SERPL-MCNC: 117 MG/DL — HIGH (ref 70–99)
HCT VFR BLD CALC: 25 % — LOW (ref 39–50)
HGB BLD-MCNC: 8 G/DL — LOW (ref 13–17)
MCHC RBC-ENTMCNC: 31.4 PG — SIGNIFICANT CHANGE UP (ref 27–34)
MCHC RBC-ENTMCNC: 32 % — SIGNIFICANT CHANGE UP (ref 32–36)
MCV RBC AUTO: 98 FL — SIGNIFICANT CHANGE UP (ref 80–100)
NRBC # FLD: 0 — SIGNIFICANT CHANGE UP
PLATELET # BLD AUTO: 118 K/UL — LOW (ref 150–400)
PMV BLD: 14.1 FL — HIGH (ref 7–13)
POTASSIUM SERPL-MCNC: 5 MMOL/L — SIGNIFICANT CHANGE UP (ref 3.5–5.3)
POTASSIUM SERPL-SCNC: 5 MMOL/L — SIGNIFICANT CHANGE UP (ref 3.5–5.3)
RBC # BLD: 2.55 M/UL — LOW (ref 4.2–5.8)
RBC # FLD: 17.3 % — HIGH (ref 10.3–14.5)
SODIUM SERPL-SCNC: 142 MMOL/L — SIGNIFICANT CHANGE UP (ref 135–145)
WBC # BLD: 15.43 K/UL — HIGH (ref 3.8–10.5)
WBC # FLD AUTO: 15.43 K/UL — HIGH (ref 3.8–10.5)

## 2017-10-12 PROCEDURE — 99233 SBSQ HOSP IP/OBS HIGH 50: CPT

## 2017-10-12 PROCEDURE — 71010: CPT | Mod: 26

## 2017-10-12 RX ADMIN — Medication 2 PUFF(S): at 07:15

## 2017-10-12 RX ADMIN — Medication 1 MILLIGRAM(S): at 13:35

## 2017-10-12 RX ADMIN — APIXABAN 5 MILLIGRAM(S): 2.5 TABLET, FILM COATED ORAL at 06:56

## 2017-10-12 RX ADMIN — Medication 2 PUFF(S): at 16:46

## 2017-10-12 RX ADMIN — DIVALPROEX SODIUM 500 MILLIGRAM(S): 500 TABLET, DELAYED RELEASE ORAL at 18:43

## 2017-10-12 RX ADMIN — PANTOPRAZOLE SODIUM 40 MILLIGRAM(S): 20 TABLET, DELAYED RELEASE ORAL at 17:03

## 2017-10-12 RX ADMIN — POLYETHYLENE GLYCOL 3350 17 GRAM(S): 17 POWDER, FOR SOLUTION ORAL at 21:50

## 2017-10-12 RX ADMIN — Medication 0.25 MILLIGRAM(S): at 06:56

## 2017-10-12 RX ADMIN — ATORVASTATIN CALCIUM 80 MILLIGRAM(S): 80 TABLET, FILM COATED ORAL at 21:50

## 2017-10-12 RX ADMIN — ALBUTEROL 2 PUFF(S): 90 AEROSOL, METERED ORAL at 22:04

## 2017-10-12 RX ADMIN — ALBUTEROL 2 PUFF(S): 90 AEROSOL, METERED ORAL at 04:33

## 2017-10-12 RX ADMIN — VORICONAZOLE 200 MILLIGRAM(S): 10 INJECTION, POWDER, LYOPHILIZED, FOR SOLUTION INTRAVENOUS at 18:44

## 2017-10-12 RX ADMIN — CHLORHEXIDINE GLUCONATE 1 APPLICATION(S): 213 SOLUTION TOPICAL at 06:56

## 2017-10-12 RX ADMIN — PANTOPRAZOLE SODIUM 40 MILLIGRAM(S): 20 TABLET, DELAYED RELEASE ORAL at 06:55

## 2017-10-12 RX ADMIN — Medication 40 MILLIGRAM(S): at 06:55

## 2017-10-12 RX ADMIN — Medication 325 MILLIGRAM(S): at 13:34

## 2017-10-12 RX ADMIN — Medication 0.25 MILLIGRAM(S): at 21:50

## 2017-10-12 RX ADMIN — Medication 325 MILLIGRAM(S): at 21:50

## 2017-10-12 RX ADMIN — Medication 0.25 MILLIGRAM(S): at 13:36

## 2017-10-12 RX ADMIN — APIXABAN 5 MILLIGRAM(S): 2.5 TABLET, FILM COATED ORAL at 18:43

## 2017-10-12 RX ADMIN — Medication 81 MILLIGRAM(S): at 13:35

## 2017-10-12 RX ADMIN — BUDESONIDE AND FORMOTEROL FUMARATE DIHYDRATE 2 PUFF(S): 160; 4.5 AEROSOL RESPIRATORY (INHALATION) at 10:00

## 2017-10-12 RX ADMIN — HYDROMORPHONE HYDROCHLORIDE 1 MILLIGRAM(S): 2 INJECTION INTRAMUSCULAR; INTRAVENOUS; SUBCUTANEOUS at 13:45

## 2017-10-12 RX ADMIN — ALBUTEROL 2 PUFF(S): 90 AEROSOL, METERED ORAL at 16:46

## 2017-10-12 RX ADMIN — BUDESONIDE AND FORMOTEROL FUMARATE DIHYDRATE 2 PUFF(S): 160; 4.5 AEROSOL RESPIRATORY (INHALATION) at 22:04

## 2017-10-12 RX ADMIN — VORICONAZOLE 200 MILLIGRAM(S): 10 INJECTION, POWDER, LYOPHILIZED, FOR SOLUTION INTRAVENOUS at 06:55

## 2017-10-12 RX ADMIN — Medication 325 MILLIGRAM(S): at 06:56

## 2017-10-12 RX ADMIN — DIVALPROEX SODIUM 500 MILLIGRAM(S): 500 TABLET, DELAYED RELEASE ORAL at 06:56

## 2017-10-12 RX ADMIN — Medication 400 MILLIGRAM(S): at 00:19

## 2017-10-12 RX ADMIN — HYDROMORPHONE HYDROCHLORIDE 1 MILLIGRAM(S): 2 INJECTION INTRAMUSCULAR; INTRAVENOUS; SUBCUTANEOUS at 13:30

## 2017-10-12 RX ADMIN — ALBUTEROL 2 PUFF(S): 90 AEROSOL, METERED ORAL at 00:12

## 2017-10-12 RX ADMIN — ALBUTEROL 2 PUFF(S): 90 AEROSOL, METERED ORAL at 10:00

## 2017-10-12 NOTE — PROGRESS NOTE ADULT - SUBJECTIVE AND OBJECTIVE BOX
Patient is a 47y old  Male who presents with a chief complaint of "My leg wouldn't stop shaking." (14 Sep 2017 09:59)    pt is doing ok  less wheezing  and much less SOB       Any change in ROS:     MEDICATIONS  (STANDING):  ALBUTerol    90 MICROgram(s) HFA Inhaler 2 Puff(s) Inhalation every 4 hours  ALPRAZolam 0.25 milliGRAM(s) Oral three times a day  apixaban 5 milliGRAM(s) Oral every 12 hours  aspirin  chewable 81 milliGRAM(s) Oral daily  atorvastatin 80 milliGRAM(s) Oral at bedtime  buDESOnide 160 MICROgram(s)/formoterol 4.5 MICROgram(s) Inhaler 2 Puff(s) Inhalation every 12 hours  chlorhexidine 4% Liquid 1 Application(s) Topical daily  diVALproex  milliGRAM(s) Oral two times a day  docusate sodium 100 milliGRAM(s) Oral two times a day  ferrous    sulfate 325 milliGRAM(s) Oral every 8 hours  folic acid 1 milliGRAM(s) Oral daily  ipratropium 17 MICROgram(s) HFA Inhaler 2 Puff(s) Inhalation every 8 hours  pantoprazole    Tablet 40 milliGRAM(s) Oral two times a day before meals  polyethylene glycol 3350 17 Gram(s) Oral at bedtime  predniSONE   Tablet 40 milliGRAM(s) Oral daily  voriconazole 200 milliGRAM(s) Oral every 12 hours    MEDICATIONS  (PRN):  acetaminophen    Suspension 650 milliGRAM(s) Oral every 6 hours PRN For Temp greater than 38 C (100.4 F)  acetaminophen   Tablet. 650 milliGRAM(s) Oral every 6 hours PRN Mild Pain (1 - 3)  aluminum hydroxide/magnesium hydroxide/simethicone Suspension 30 milliLiter(s) Oral every 6 hours PRN Dyspepsia  HYDROmorphone  Injectable 1 milliGRAM(s) IV Push every 4 hours PRN breakthrough pain    Vital Signs Last 24 Hrs  T(C): 37 (12 Oct 2017 16:00), Max: 37.2 (11 Oct 2017 20:00)  T(F): 98.6 (12 Oct 2017 16:00), Max: 98.9 (11 Oct 2017 20:00)  HR: 77 (12 Oct 2017 18:00) (70 - 99)  BP: 109/76 (12 Oct 2017 18:00) (108/67 - 130/82)  BP(mean): 83 (12 Oct 2017 18:00) (77 - 98)  RR: 18 (12 Oct 2017 18:00) (13 - 26)  SpO2: 96% (12 Oct 2017 18:00) (94% - 100%)    I&O's Summary    11 Oct 2017 07:01  -  12 Oct 2017 07:00  --------------------------------------------------------  IN: 500 mL / OUT: 805 mL / NET: -305 mL    12 Oct 2017 07:01  -  12 Oct 2017 18:43  --------------------------------------------------------  IN: 960 mL / OUT: 650 mL / NET: 310 mL          Physical Exam:   GENERAL: NAD, well-groomed, well-developed  HEENT: GIOVANI/   Atraumatic, Normocephalic  ENMT: No tonsillar erythema, exudates, or enlargement; Moist mucous membranes, Good dentition, No lesions  NECK: Supple, No JVD, Normal thyroid  CHEST/LUNG: mostly clear: but poor inspiration  CVS: Regular rate and rhythm; No murmurs, rubs, or gallops  GI: : Soft, Nontender, Nondistended; Bowel sounds present  NERVOUS SYSTEM:  Alert & Oriented X3  EXTREMITIES:  2+ Peripheral Pulses, No clubbing, cyanosis, or edema  LYMPH: No lymphadenopathy noted  SKIN: No rashes or lesions  ENDOCRINOLOGY: No Thyromegaly  PSYCH: Appropriate    Labs:                              8.0    15.43 )-----------( 118      ( 12 Oct 2017 05:00 )             25.0                         9.6    16.79 )-----------( 153      ( 11 Oct 2017 04:45 )             30.2                         10.1   10.64 )-----------( 169      ( 10 Oct 2017 06:30 )             31.5                         8.2    7.95  )-----------( 153      ( 09 Oct 2017 04:25 )             26.3     10-12    142  |  105  |  20  ----------------------------<  117<H>  5.0   |  26  |  0.94  10-11    144  |  102  |  16  ----------------------------<  161<H>  4.3   |  32<H>  |  0.85  10-10    142  |  99  |  14  ----------------------------<  117<H>  4.6   |  31  |  0.92  10-09    145  |  103  |  13  ----------------------------<  92  4.0   |  36<H>  |  0.84    Ca    8.0<L>      12 Oct 2017 05:00  Ca    8.8      11 Oct 2017 04:45  Phos  3.1     10-11  Mg     2.0     10-11    TPro  5.3<L>  /  Alb  2.8<L>  /  TBili  0.2  /  DBili  x   /  AST  19  /  ALT  16  /  AlkPhos  53  10-09    CAPILLARY BLOOD GLUCOSE                Cultures:     < from: Xray Chest 1 View AP -PORTABLE-Routine (10.12.17 @ 07:00) >            IMPRESSION:  Moderate left pneumothorax which is larger now also includes   a small basilar component.    Discussed with Dr. Wen with read back at 10:04 AM on October 12, 2017 by Dr. Fields.                  KEERTHI FIELDS M.D., ATTENDING RADIOLOGIST  This document has been electronically signed. Oct 12 2017 10:05AM    < end of copied text >                          Studies  Chest X-RAY  CT SCAN Chest   Venous Dopplers: LE:   CT Abdomen  Others

## 2017-10-12 NOTE — PROGRESS NOTE ADULT - PROBLEM SELECTOR PLAN 1
S/P surgery : reop twice secondary to hemothroax: currently on pressors and ventilator support: He is wheezing extensively: started on IV steroids 20 mg twice a day , but should be given only for few days : follow his wheezing daily as soon as it is resolved; dc steroids For now their is no hemoptysis: He had twice reop yesterday secondary to hemothorax and bleeding from chest tube: currently on pressors and seated and on broad spectrum antibiotics.  9/27: he seems to be doing better today: for extubation  9/28: extubated: alert and awake and responding: He is on zosyn any way with voriconazole!  9/29: chest x-ray today , to me looks better then yesterday: There is no official report yet:  10/02: doing ok  10/3: on eliquis: toelrating well: no hemoptysis  10/4 : stable  10/05: persistent left upper air space following DAVID lobectomy: defer to thoracic surgery  10/06: stable?: no HEMOPTYSIS  10/7: resolved  10/8: cont prednisone: pneumothorax has resolved  10/09: resolved  10/10: no more hemoptysis  1/11: resolved after surgery and despite eliquis: stable  11/12: stable: s./p surgery: has mod pneumothorax: on the elft side: would defer to ct surgery: back on suction!

## 2017-10-12 NOTE — PROGRESS NOTE ADULT - PROBLEM SELECTOR PLAN 3
wheezing is present :pk pressures in 30's : started him on steroids again  bronchodilators ATC:  9/27; if he continues to show improvement: can decrease his solumedrol tomorrow  9/28: decreases his IV steroids to 20 mg once a day for a few days more  9/29: on 20 mg per day of solumedrol: would continue for few more days and then decrease it  9/30: doing ok: - wheezing: decrease his steroids to 10 mg a day from tomorrow  10/1: change to po prednisone 10 mg  10/02; cont po prednisone  10/3: stop prednisone after today,s dose?  10/4 : dc prednisone after tomorrow dose  10/5: DC prednisone today  10/6: OF PREDNISONE NOW  10/07: has sarcoidosis too: SOB with poor resp excursion: would start him on 20 mg of prednisone  10/8: cont prednisone with BD  10/09: his wheezing has increased today: Would change to IV METHYLPREDNISOLONE: 20 MG Q 8 HOURS  1/10was on 40 mg three times a day: dw icu attending , to decrease it to 20 q 8 hours  11/11: would change to po prednisone 40 mg daily  11/12: on steroids for now with slow taper

## 2017-10-12 NOTE — PROGRESS NOTE ADULT - SUBJECTIVE AND OBJECTIVE BOX
47 M PMH of sarcoidosis, HTN, COPD, and asthma who was initially admitted on 09/10 w cc of severe leg spasms X two days  In E.D. pt found to be wheezing, so he was seen by Pulmonary and started on IV Steroids for sarcoidosis  On 9/12 at 4 am, a RRT and Stroke codes were called as the patient became unresponsive.  He was transferred to MICU and then intubated for airway protection.   The patient was found to have a new infarct on MRI and also found to have a patent PFO on this admission as well. He was weaned off vent on 09/13 and transferred to the floors   There was concern patient was having paroxysmal afib, so he was started on Eliquis yesterday evening. He has received 2 doses of Eliquis on 09/19.   However, on 09/20, the patient had a coughing episode, which was not unusual for him, but during this episode he began to cough up fresh blood, about 1/2 cup of blood. He underwent Bronchoscopy with bronchoalveolar lavage of both sides and epi injection on 09/21 and was to have received further IR embolization for hemoptysis control in the next few days.  However, he developed hemoptysis again and underwent urgent LULectomy, complicated with post op bleeding.    Left VATS and DAVID lobectomy 9/24/17 [POD # 18]  Re-op L thoracotomy, evacuation of hemothorax (600cc clot, 400cc blood) no obvious site of bleeding  9/25/17 [POD # 17]  Re-op L thoracotomy, evacuation of hemothorax; RTOR x2 100cc clot; 200cc blood: bleeding posterior intercostal arteries identified and cauterized 9/25/17 [POD # 17]  Pt was transferred to ICU because of increased SOB on the floor. Pt also had increased left apical pneumo for which I.R put a   Pigtail catheter. 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              Sarcoidosis               PFO on Eliquis              Anemia  CVA  with L-side weakness              Pneumothorax and Air leak              S/p post op bleeding              S/P Pulm edema, possible TRALI              s/p massive transfusion       Home Medications:   * Incomplete Medication History as of 11-Sep-2017 09:23 documented in Structured Notes  · 	Azithromycin 5 Day Dose Pack 250 mg oral tablet: 1 dose(s) orally once a day for respiratory infection, Last Dose Taken:    · 	predniSONE 50 mg oral tablet: 1 tab(s) orally once a day , Last Dose Taken:    · 	albuterol 1.25 mg/3 mL (0.042%) inhalation solution: 3 milliliter(s) inhaled 3 times a day, Last Dose Taken:    · 	losartan 25 mg oral tablet: 1 tab(s) orally 2 times a day, Last Dose Taken:    · 	Symbicort 160 mcg-4.5 mcg/inh inhalation aerosol: 2 puff(s) inhaled 2 times a day, Last Dose Taken:      MEDICATIONS  (STANDING):  ALBUTerol    90 MICROgram(s) HFA Inhaler 2 Puff(s) Inhalation every 4 hours  ALPRAZolam 0.25 milliGRAM(s) Oral three times a day  apixaban 5 milliGRAM(s) Oral every 12 hours  aspirin  chewable 81 milliGRAM(s) Oral daily  atorvastatin 80 milliGRAM(s) Oral at bedtime  buDESOnide 160 MICROgram(s)/formoterol 4.5 MICROgram(s) Inhaler 2 Puff(s) Inhalation every 12 hours  chlorhexidine 4% Liquid 1 Application(s) Topical daily  diVALproex  milliGRAM(s) Oral two times a day  docusate sodium 100 milliGRAM(s) Oral two times a day  ferrous    sulfate 325 milliGRAM(s) Oral every 8 hours  folic acid 1 milliGRAM(s) Oral daily  ipratropium 17 MICROgram(s) HFA Inhaler 2 Puff(s) Inhalation every 8 hours  pantoprazole    Tablet 40 milliGRAM(s) Oral two times a day before meals  polyethylene glycol 3350 17 Gram(s) Oral at bedtime  predniSONE   Tablet 40 milliGRAM(s) Oral daily  voriconazole 200 milliGRAM(s) Oral every 12 hours    MEDICATIONS  (PRN):  acetaminophen    Suspension 650 milliGRAM(s) Oral every 6 hours PRN For Temp greater than 38 C (100.4 F)  acetaminophen   Tablet. 650 milliGRAM(s) Oral every 6 hours PRN Mild Pain (1 - 3)  aluminum hydroxide/magnesium hydroxide/simethicone Suspension 30 milliLiter(s) Oral every 6 hours PRN Dyspepsia  HYDROmorphone  Injectable 1 milliGRAM(s) IV Push every 4 hours PRN breakthrough pain    ICU Vital Signs Last 24 Hrs  T(C): 36.9 (12 Oct 2017 04:00), Max: 37.2 (11 Oct 2017 20:00)  T(F): 98.4 (12 Oct 2017 04:00), Max: 98.9 (11 Oct 2017 20:00)  HR: 95 (12 Oct 2017 12:00) (70 - 99)  BP: 119/75 (12 Oct 2017 12:00) (108/67 - 130/82)  BP(mean): 85 (12 Oct 2017 12:00) (77 - 98)  RR: 20 (12 Oct 2017 12:00) (15 - 26)  SpO2: 100% (12 Oct 2017 12:00) (94% - 100%)    Physical exam:                           General:               Awake, alert,                                                Neuro:                 focal,  weak   L>R  KAMLESH>LL                          Cardiovascular:    S1 & S2, regular                           Respiratory:         Air entry is decreased at left upper chest and has bilateral conducted sounds, rhonchi                           GI:                       Soft, nondistended and nontender, Bowel sounds active                            Ext:                      No cyanosis &  mild  bilateral  edema of upper ext    I&O's Summary  11 Oct 2017 07:01  -  12 Oct 2017 07:00  --------------------------------------------------------  IN: 500 mL / OUT: 805 mL / NET: -305 mL    12 Oct 2017 07:01  -  12 Oct 2017 13:20  --------------------------------------------------------  IN: 0 mL / OUT: 250 mL / NET: -250 mL    Labs:                                                                       8.0    15.43 )-----------( 118      ( 12 Oct 2017 05:00 )             25.0                            10-12  142  |  105  |  20  ----------------------------<  117<H>  5.0   |  26  |  0.94    Ca    8.0<L>      12 Oct 2017 05:00  Phos  3.1     10-11  Mg     2.0     10-11    Plan:  47 M PMH of sarcoidosis, HTN, COPD, and asthma who was initially admitted on 09/10 w cc of severe leg spasms X two days  In E.D. pt found to be wheezing, so he was seen by Pulmonary and started on IV Steroids for sarcoidosis  On 9/12 at 4 am, a RRT and Stroke codes were called as the patient became unresponsive.  He was transferred to MICU and then intubated for airway protection.   The patient was found to have a new infarct on MRI and also found to have a patent PFO on this admission as well. He was weaned off vent on 09/13 and transferred to the floors   There was concern patient was having paroxysmal afib, so he was started on Eliquis yesterday evening. He has received 2 doses of Eliquis on 09/19.   However, on 09/20, the patient had a coughing episode, which was not unusual for him, but during this episode he began to cough up fresh blood, about 1/2 cup of blood. He underwent Bronchoscopy with bronchoalveolar lavage of both sides and epi injection on 09/21 and was to have received further IR embolization for hemoptysis control in the next few days.  However, he developed hemoptysis again and underwent urgent LULectomy, complicated with post op bleeding.    Left VATS and DAVID lobectomy 9/24/17 [POD # 18]  Re-op L thoracotomy, evacuation of hemothorax (600cc clot, 400cc blood) no obvious site of bleeding  9/25/17 [POD # 17]  Re-op L thoracotomy, evacuation of hemothorax; RTOR x2 100cc clot; 200cc blood: bleeding posterior intercostal arteries identified and cauterized 9/25/17 [POD # 17]  Pt was transferred to ICU because of increased SOB on the floor. Pt also had increased left apical pneumo for which I.R put a   Pigtail catheter. 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     Physical medicine evaluation.                              Cardiovascular:                                             Continue hemodynamic monitoring.    PFO - On Eliquis 5mg/bid                            Respiratory:                                                                                                                                                                                                                                                                                                                                                                                                                                                                                                                                                                                                                                                                                                                                                                                                                                                                                                      Comfortable, not in any distress.                                         Monitor chest tube output.                                         Pneumothorax with L pigtail with Small air leak back on suction - 20 cmH2O                                                             Sarcoidosis: Steroids resumed as per pulmonary                             GI                                         Tolerating PO                                         Continue GI prophylaxis with Protonix                                         Continue Zofran / Reglan for nausea - PRN	                                                                 Renal:                                                                                  Monitor I/Os and electrolytes                                                 Hem/ Onc:                                                                                  Monitor chest tube output     Anemia: Continue Iron / FA                            Infectious disease:    Aspergilloma - On voriconazole 200 milliGRAM(s) Oral every 12 hours.     Monitor for fever / leukocytosis.                                         All surgical incision / chest tube  sites look clean                            Endocrine                                          Continue Accu-Checks with coverage    All available pertinent clinical, laboratory, radiographic, hemodynamic, echocardiographic, respiratory data, microbiologic data and chart were reviewed and analyzed   Patient seen, examined and plan discussed with CT Surgeon / CTICU team.    Prosper Wen MD

## 2017-10-13 LAB
BUN SERPL-MCNC: 18 MG/DL — SIGNIFICANT CHANGE UP (ref 7–23)
CALCIUM SERPL-MCNC: 8.7 MG/DL — SIGNIFICANT CHANGE UP (ref 8.4–10.5)
CHLORIDE SERPL-SCNC: 102 MMOL/L — SIGNIFICANT CHANGE UP (ref 98–107)
CO2 SERPL-SCNC: 31 MMOL/L — SIGNIFICANT CHANGE UP (ref 22–31)
CREAT SERPL-MCNC: 0.92 MG/DL — SIGNIFICANT CHANGE UP (ref 0.5–1.3)
GLUCOSE SERPL-MCNC: 92 MG/DL — SIGNIFICANT CHANGE UP (ref 70–99)
HCT VFR BLD CALC: 31.3 % — LOW (ref 39–50)
HGB BLD-MCNC: 9.6 G/DL — LOW (ref 13–17)
MAGNESIUM SERPL-MCNC: 2.1 MG/DL — SIGNIFICANT CHANGE UP (ref 1.6–2.6)
MCHC RBC-ENTMCNC: 30.7 % — LOW (ref 32–36)
MCHC RBC-ENTMCNC: 30.7 PG — SIGNIFICANT CHANGE UP (ref 27–34)
MCV RBC AUTO: 100 FL — SIGNIFICANT CHANGE UP (ref 80–100)
NRBC # FLD: 0 — SIGNIFICANT CHANGE UP
PHOSPHATE SERPL-MCNC: 3.2 MG/DL — SIGNIFICANT CHANGE UP (ref 2.5–4.5)
PLATELET # BLD AUTO: 118 K/UL — LOW (ref 150–400)
PMV BLD: 14.4 FL — HIGH (ref 7–13)
POTASSIUM SERPL-MCNC: 4.3 MMOL/L — SIGNIFICANT CHANGE UP (ref 3.5–5.3)
POTASSIUM SERPL-SCNC: 4.3 MMOL/L — SIGNIFICANT CHANGE UP (ref 3.5–5.3)
RBC # BLD: 3.13 M/UL — LOW (ref 4.2–5.8)
RBC # FLD: 16.8 % — HIGH (ref 10.3–14.5)
SODIUM SERPL-SCNC: 143 MMOL/L — SIGNIFICANT CHANGE UP (ref 135–145)
WBC # BLD: 9.82 K/UL — SIGNIFICANT CHANGE UP (ref 3.8–10.5)
WBC # FLD AUTO: 9.82 K/UL — SIGNIFICANT CHANGE UP (ref 3.8–10.5)

## 2017-10-13 PROCEDURE — 71010: CPT | Mod: 26

## 2017-10-13 RX ORDER — KETOROLAC TROMETHAMINE 30 MG/ML
15 SYRINGE (ML) INJECTION EVERY 6 HOURS
Qty: 0 | Refills: 0 | Status: DISCONTINUED | OUTPATIENT
Start: 2017-10-13 | End: 2017-10-13

## 2017-10-13 RX ADMIN — Medication 15 MILLIGRAM(S): at 10:17

## 2017-10-13 RX ADMIN — Medication 15 MILLIGRAM(S): at 23:30

## 2017-10-13 RX ADMIN — DIVALPROEX SODIUM 500 MILLIGRAM(S): 500 TABLET, DELAYED RELEASE ORAL at 17:55

## 2017-10-13 RX ADMIN — Medication 100 MILLIGRAM(S): at 17:55

## 2017-10-13 RX ADMIN — Medication 325 MILLIGRAM(S): at 14:03

## 2017-10-13 RX ADMIN — Medication 100 MILLIGRAM(S): at 05:50

## 2017-10-13 RX ADMIN — ALBUTEROL 2 PUFF(S): 90 AEROSOL, METERED ORAL at 02:26

## 2017-10-13 RX ADMIN — Medication 2 PUFF(S): at 08:45

## 2017-10-13 RX ADMIN — Medication 0.25 MILLIGRAM(S): at 21:16

## 2017-10-13 RX ADMIN — BUDESONIDE AND FORMOTEROL FUMARATE DIHYDRATE 2 PUFF(S): 160; 4.5 AEROSOL RESPIRATORY (INHALATION) at 21:16

## 2017-10-13 RX ADMIN — Medication 325 MILLIGRAM(S): at 05:50

## 2017-10-13 RX ADMIN — ALBUTEROL 2 PUFF(S): 90 AEROSOL, METERED ORAL at 14:02

## 2017-10-13 RX ADMIN — Medication 650 MILLIGRAM(S): at 17:56

## 2017-10-13 RX ADMIN — Medication 2 PUFF(S): at 00:05

## 2017-10-13 RX ADMIN — Medication 15 MILLIGRAM(S): at 16:34

## 2017-10-13 RX ADMIN — BUDESONIDE AND FORMOTEROL FUMARATE DIHYDRATE 2 PUFF(S): 160; 4.5 AEROSOL RESPIRATORY (INHALATION) at 09:38

## 2017-10-13 RX ADMIN — Medication 650 MILLIGRAM(S): at 08:44

## 2017-10-13 RX ADMIN — ATORVASTATIN CALCIUM 80 MILLIGRAM(S): 80 TABLET, FILM COATED ORAL at 21:16

## 2017-10-13 RX ADMIN — Medication 2 PUFF(S): at 17:57

## 2017-10-13 RX ADMIN — Medication 2 PUFF(S): at 22:59

## 2017-10-13 RX ADMIN — APIXABAN 5 MILLIGRAM(S): 2.5 TABLET, FILM COATED ORAL at 17:56

## 2017-10-13 RX ADMIN — ALBUTEROL 2 PUFF(S): 90 AEROSOL, METERED ORAL at 05:49

## 2017-10-13 RX ADMIN — Medication 650 MILLIGRAM(S): at 18:53

## 2017-10-13 RX ADMIN — Medication 1 MILLIGRAM(S): at 14:01

## 2017-10-13 RX ADMIN — ALBUTEROL 2 PUFF(S): 90 AEROSOL, METERED ORAL at 08:37

## 2017-10-13 RX ADMIN — Medication 15 MILLIGRAM(S): at 22:58

## 2017-10-13 RX ADMIN — Medication 0.25 MILLIGRAM(S): at 05:49

## 2017-10-13 RX ADMIN — Medication 81 MILLIGRAM(S): at 14:02

## 2017-10-13 RX ADMIN — PANTOPRAZOLE SODIUM 40 MILLIGRAM(S): 20 TABLET, DELAYED RELEASE ORAL at 05:51

## 2017-10-13 RX ADMIN — Medication 15 MILLIGRAM(S): at 10:58

## 2017-10-13 RX ADMIN — ALBUTEROL 2 PUFF(S): 90 AEROSOL, METERED ORAL at 17:52

## 2017-10-13 RX ADMIN — Medication 325 MILLIGRAM(S): at 21:16

## 2017-10-13 RX ADMIN — VORICONAZOLE 200 MILLIGRAM(S): 10 INJECTION, POWDER, LYOPHILIZED, FOR SOLUTION INTRAVENOUS at 17:55

## 2017-10-13 RX ADMIN — APIXABAN 5 MILLIGRAM(S): 2.5 TABLET, FILM COATED ORAL at 05:50

## 2017-10-13 RX ADMIN — POLYETHYLENE GLYCOL 3350 17 GRAM(S): 17 POWDER, FOR SOLUTION ORAL at 21:16

## 2017-10-13 RX ADMIN — Medication 0.25 MILLIGRAM(S): at 14:02

## 2017-10-13 RX ADMIN — Medication 40 MILLIGRAM(S): at 05:50

## 2017-10-13 RX ADMIN — Medication 15 MILLIGRAM(S): at 17:18

## 2017-10-13 RX ADMIN — PANTOPRAZOLE SODIUM 40 MILLIGRAM(S): 20 TABLET, DELAYED RELEASE ORAL at 17:58

## 2017-10-13 RX ADMIN — DIVALPROEX SODIUM 500 MILLIGRAM(S): 500 TABLET, DELAYED RELEASE ORAL at 05:50

## 2017-10-13 RX ADMIN — VORICONAZOLE 200 MILLIGRAM(S): 10 INJECTION, POWDER, LYOPHILIZED, FOR SOLUTION INTRAVENOUS at 05:50

## 2017-10-13 RX ADMIN — ALBUTEROL 2 PUFF(S): 90 AEROSOL, METERED ORAL at 21:16

## 2017-10-13 NOTE — PROGRESS NOTE ADULT - PROBLEM SELECTOR PLAN 6
on steroids at this time: it was given only for a short duration: thinking his wheezing is secondary to rhinovirus infection: It will cont till tomorrow: thereafter would taper it down to off in next 2-3  10/1: change prednisone to 10 mg daily  10/02: cont prednisone  10/03: trop prednisone after today's dose  10/4 stop prednisone tomorrow  10/5: dc prednisone today : pt was not on any treatment for sarcoidosis before  10/06: OFF PREDNISONE NOW  10/7: start prednisone 20 mg per day  10/09: started back on IV steroids given his increased wheezing!!  10/10: wheezing better: decrease steroids to 20 mg q 8  10//: decrease and change prednisone to 40 mg daily: probably will be discharged on some prednisone  10/13: decrease prednisone to 30 mg tomorrow

## 2017-10-13 NOTE — PROGRESS NOTE ADULT - SUBJECTIVE AND OBJECTIVE BOX
Patient is a 47y old  Male who presents with a chief complaint of "My leg wouldn't stop shaking." (14 Sep 2017 09:59)    feels less SOB   he is less SOB on walking   Any change in ROS:     MEDICATIONS  (STANDING):  ALBUTerol    90 MICROgram(s) HFA Inhaler 2 Puff(s) Inhalation every 4 hours  ALPRAZolam 0.25 milliGRAM(s) Oral three times a day  apixaban 5 milliGRAM(s) Oral every 12 hours  aspirin  chewable 81 milliGRAM(s) Oral daily  atorvastatin 80 milliGRAM(s) Oral at bedtime  buDESOnide 160 MICROgram(s)/formoterol 4.5 MICROgram(s) Inhaler 2 Puff(s) Inhalation every 12 hours  diVALproex  milliGRAM(s) Oral two times a day  docusate sodium 100 milliGRAM(s) Oral two times a day  ferrous    sulfate 325 milliGRAM(s) Oral every 8 hours  folic acid 1 milliGRAM(s) Oral daily  ipratropium 17 MICROgram(s) HFA Inhaler 2 Puff(s) Inhalation every 8 hours  pantoprazole    Tablet 40 milliGRAM(s) Oral two times a day before meals  polyethylene glycol 3350 17 Gram(s) Oral at bedtime  predniSONE   Tablet 40 milliGRAM(s) Oral daily  voriconazole 200 milliGRAM(s) Oral every 12 hours    MEDICATIONS  (PRN):  acetaminophen    Suspension 650 milliGRAM(s) Oral every 6 hours PRN For Temp greater than 38 C (100.4 F)  acetaminophen   Tablet. 650 milliGRAM(s) Oral every 6 hours PRN Mild Pain (1 - 3)  aluminum hydroxide/magnesium hydroxide/simethicone Suspension 30 milliLiter(s) Oral every 6 hours PRN Dyspepsia  HYDROmorphone  Injectable 1 milliGRAM(s) IV Push every 4 hours PRN breakthrough pain  ketorolac   Injectable 15 milliGRAM(s) IV Push every 6 hours PRN Moderate Pain (4 - 6)    Vital Signs Last 24 Hrs  T(C): 36.8 (13 Oct 2017 11:57), Max: 37 (12 Oct 2017 16:00)  T(F): 98.3 (13 Oct 2017 11:57), Max: 98.6 (12 Oct 2017 16:00)  HR: 91 (13 Oct 2017 11:57) (67 - 91)  BP: 107/71 (13 Oct 2017 11:57) (107/71 - 126/88)  BP(mean): 92 (12 Oct 2017 19:00) (83 - 95)  RR: 18 (13 Oct 2017 11:57) (13 - 18)  SpO2: 96% (13 Oct 2017 11:57) (95% - 100%)    I&O's Summary    12 Oct 2017 07:01  -  13 Oct 2017 07:00  --------------------------------------------------------  IN: 960 mL / OUT: 758 mL / NET: 202 mL    13 Oct 2017 07:01  -  13 Oct 2017 14:43  --------------------------------------------------------  IN: 0 mL / OUT: 713 mL / NET: -713 mL          Physical Exam:   GENERAL: NAD, well-groomed, well-developed  HEENT: GIOVANI/   Atraumatic, Normocephalic  ENMT: No tonsillar erythema, exudates, or enlargement; Moist mucous membranes, Good dentition, No lesions  NECK: Supple, No JVD, Normal thyroid  CHEST/LUNG: poor air entry bilaterally  CVS: Regular rate and rhythm; No murmurs, rubs, or gallops  GI: : Soft, Nontender, Nondistended; Bowel sounds present  NERVOUS SYSTEM:  Alert & Oriented X3  EXTREMITIES:  2+ Peripheral Pulses, No clubbing, cyanosis, or edema  LYMPH: No lymphadenopathy noted  SKIN: No rashes or lesions  ENDOCRINOLOGY: No Thyromegaly  PSYCH: Appropriate    Labs:                              9.6    9.82  )-----------( 118      ( 13 Oct 2017 06:54 )             31.3                         8.0    15.43 )-----------( 118      ( 12 Oct 2017 05:00 )             25.0                         9.6    16.79 )-----------( 153      ( 11 Oct 2017 04:45 )             30.2                         10.1   10.64 )-----------( 169      ( 10 Oct 2017 06:30 )             31.5     10-13    143  |  102  |  18  ----------------------------<  92  4.3   |  31  |  0.92  10-12    142  |  105  |  20  ----------------------------<  117<H>  5.0   |  26  |  0.94  10-11    144  |  102  |  16  ----------------------------<  161<H>  4.3   |  32<H>  |  0.85  10-10    142  |  99  |  14  ----------------------------<  117<H>  4.6   |  31  |  0.92    Ca    8.7      13 Oct 2017 06:54  Ca    8.0<L>      12 Oct 2017 05:00  Phos  3.2     10-13  Mg     2.1     10-13      CAPILLARY BLOOD GLUCOSE                Cultures:         < from: Xray Chest 1 View AP -PORTABLE-Routine (10.12.17 @ 07:00) >  teral lung opacities in trace right pleural effusion are not   significantly changed.  Left pleural pigtail catheter again seen.  There is a moderate left pneumothorax which is larger and now also   includes a small basilar component.  Left subcutaneous emphysema is not significantly changed.                IMPRESSION:  Moderate left pneumothorax which is larger now also includes   a small basilar component.    Discussed with Dr. Wen with read back at 10:04 AM on October 12, 2017 by Dr. Fields.                  KEERTHI FIELDS M.D., ATTENDING RADIOLOGIST  This document has been electronically signed. Oct 12 2017 10:05AM        < end of copied text >                      Studies  Chest X-RAY  CT SCAN Chest   Venous Dopplers: LE:   CT Abdomen  Others

## 2017-10-13 NOTE — PROGRESS NOTE ADULT - SUBJECTIVE AND OBJECTIVE BOX
Subjective: Pt admits anxious. Some SOB with ambulation    Vital Signs:  Vital Signs Last 24 Hrs  T(C): 36.9 (10-13-17 @ 08:24), Max: 37.1 (10-12-17 @ 12:00)  T(F): 98.4 (10-13-17 @ 08:24), Max: 98.7 (10-12-17 @ 12:00)  HR: 89 (10-13-17 @ 08:24) (67 - 95)  BP: 126/82 (10-13-17 @ 08:24) (109/76 - 126/88)  RR: 18 (10-13-17 @ 08:24) (13 - 21)  SpO2: 98% (10-13-17 @ 08:24) (95% - 100%) on (O2)    Telemetry/Alarms:  General: WN/WD NAD  Neurology: Awake, nonfocal, BETH x 4  Eyes: Scleras clear, PERRLA/ EOMI, Gross vision intact  ENT:Gross hearing intact, grossly patent pharynx, no stridor  Neck: Neck supple, trachea midline, No JVD,   Respiratory: CTA B/L, No wheezing, rales, rhonchi  CV: RRR, S1S2, no murmurs, rubs or gallops  Abdominal: Soft, NT, ND +BS,   Extremities: No edema, + peripheral pulses  Skin: No Rashes, Hematoma, Ecchymosis  Lymphatic: No Neck, axilla, groin LAD  Psych: Oriented x 3, normal affect  Incisions: c,d,i   Tubes: L PTC to suction this AM  Relevant labs, radiology and Medications reviewed                        9.6    9.82  )-----------( 118      ( 13 Oct 2017 06:54 )             31.3     10-13    143  |  102  |  18  ----------------------------<  92  4.3   |  31  |  0.92    Ca    8.7      13 Oct 2017 06:54  Phos  3.2     10-13  Mg     2.1     10-13        MEDICATIONS  (STANDING):  ALBUTerol    90 MICROgram(s) HFA Inhaler 2 Puff(s) Inhalation every 4 hours  ALPRAZolam 0.25 milliGRAM(s) Oral three times a day  apixaban 5 milliGRAM(s) Oral every 12 hours  aspirin  chewable 81 milliGRAM(s) Oral daily  atorvastatin 80 milliGRAM(s) Oral at bedtime  buDESOnide 160 MICROgram(s)/formoterol 4.5 MICROgram(s) Inhaler 2 Puff(s) Inhalation every 12 hours  diVALproex  milliGRAM(s) Oral two times a day  docusate sodium 100 milliGRAM(s) Oral two times a day  ferrous    sulfate 325 milliGRAM(s) Oral every 8 hours  folic acid 1 milliGRAM(s) Oral daily  ipratropium 17 MICROgram(s) HFA Inhaler 2 Puff(s) Inhalation every 8 hours  pantoprazole    Tablet 40 milliGRAM(s) Oral two times a day before meals  polyethylene glycol 3350 17 Gram(s) Oral at bedtime  predniSONE   Tablet 40 milliGRAM(s) Oral daily  voriconazole 200 milliGRAM(s) Oral every 12 hours    MEDICATIONS  (PRN):  acetaminophen    Suspension 650 milliGRAM(s) Oral every 6 hours PRN For Temp greater than 38 C (100.4 F)  acetaminophen   Tablet. 650 milliGRAM(s) Oral every 6 hours PRN Mild Pain (1 - 3)  aluminum hydroxide/magnesium hydroxide/simethicone Suspension 30 milliLiter(s) Oral every 6 hours PRN Dyspepsia  HYDROmorphone  Injectable 1 milliGRAM(s) IV Push every 4 hours PRN breakthrough pain  ketorolac   Injectable 15 milliGRAM(s) IV Push every 6 hours PRN Moderate Pain (4 - 6)    Pertinent Physical Exam  I&O's Summary    12 Oct 2017 07:01  -  13 Oct 2017 07:00  --------------------------------------------------------  IN: 960 mL / OUT: 758 mL / NET: 202 mL    13 Oct 2017 07:01  -  13 Oct 2017 11:43  --------------------------------------------------------  IN: 0 mL / OUT: 508 mL / NET: -508 mL        Assessment  47y Male  w/ PAST MEDICAL & SURGICAL HISTORY:  History of pneumothorax: History of 3 prior pneumonthoracies.  COPD (chronic obstructive pulmonary disease)  Asthma  Hypertension  Sarcoidosis  No significant past surgical history  admitted with complaints of Patient is a 47y old  Male who presents with a chief complaint of "My leg wouldn't stop shaking." (14 Sep 2017 09:59)  .  On (9/24/17 ), patient underwent Thoracotomy, DAVID lobectomy w RTOR 9/25/17 x2 for bleeding.     . Postoperative course/issues: apical L PTC for PTX    PLAN  Neuro: Pain management  Pulm: Encourage coughing, deep breathing and use of incentive spirometry. Wean off supplemental oxygen as able. Daily CXR.   Cardio: Monitor telemetry/alarms  GI: Tolerating diet. Continue stool softeners.  Renal: monitor urine output, supplement electrolytes as needed  Vasc: Heparin SC/SCDs for DVT prophylaxis  Heme: Stable H/H. .   ID: Off antibiotics. Stable.  Therapy: OOB/ambulate  Tubes: PTC placed to waterseal, will repeat CXR this PM    Disposition: Aim to D/C to home once PTC removed  Discussed with Cardiothoracic Team at AM rounds.

## 2017-10-13 NOTE — PROGRESS NOTE ADULT - PROBLEM SELECTOR PLAN 1
S/P surgery : reop twice secondary to hemothroax: currently on pressors and ventilator support: He is wheezing extensively: started on IV steroids 20 mg twice a day , but should be given only for few days : follow his wheezing daily as soon as it is resolved; dc steroids For now their is no hemoptysis: He had twice reop yesterday secondary to hemothorax and bleeding from chest tube: currently on pressors and seated and on broad spectrum antibiotics.  9/27: he seems to be doing better today: for extubation  9/28: extubated: alert and awake and responding: He is on zosyn any way with voriconazole!  9/29: chest x-ray today , to me looks better then yesterday: There is no official report yet:  10/02: doing ok  10/3: on eliquis: toelrating well: no hemoptysis  10/4 : stable  10/05: persistent left upper air space following DAVID lobectomy: defer to thoracic surgery  10/06: stable?: no HEMOPTYSIS  10/7: resolved  10/8: cont prednisone: pneumothorax has resolved  10/09: resolved  10/10: no more hemoptysis  1/11: resolved after surgery and despite eliquis: stable  11/12: stable: s./p surgery: has mod pneumothorax: on the elft side: would defer to ct surgery: back on suction!  11/13: doing   ok

## 2017-10-13 NOTE — PROGRESS NOTE ADULT - PROBLEM SELECTOR PLAN 3
wheezing is present :pk pressures in 30's : started him on steroids again  bronchodilators ATC:  9/27; if he continues to show improvement: can decrease his solumedrol tomorrow  9/28: decreases his IV steroids to 20 mg once a day for a few days more  9/29: on 20 mg per day of solumedrol: would continue for few more days and then decrease it  9/30: doing ok: - wheezing: decrease his steroids to 10 mg a day from tomorrow  10/1: change to po prednisone 10 mg  10/02; cont po prednisone  10/3: stop prednisone after today,s dose?  10/4 : dc prednisone after tomorrow dose  10/5: DC prednisone today  10/6: OF PREDNISONE NOW  10/07: has sarcoidosis too: SOB with poor resp excursion: would start him on 20 mg of prednisone  10/8: cont prednisone with BD  10/09: his wheezing has increased today: Would change to IV METHYLPREDNISOLONE: 20 MG Q 8 HOURS  1/10was on 40 mg three times a day: dw icu attending , to decrease it to 20 q 8 hours  11/11: would change to po prednisone 40 mg daily  11/12: on steroids for now with slow taper  11/13: decrease prednisone to 30 mg tomorrow

## 2017-10-14 LAB
BUN SERPL-MCNC: 25 MG/DL — HIGH (ref 7–23)
CALCIUM SERPL-MCNC: 8.2 MG/DL — LOW (ref 8.4–10.5)
CHLORIDE SERPL-SCNC: 104 MMOL/L — SIGNIFICANT CHANGE UP (ref 98–107)
CO2 SERPL-SCNC: 32 MMOL/L — HIGH (ref 22–31)
CREAT SERPL-MCNC: 0.97 MG/DL — SIGNIFICANT CHANGE UP (ref 0.5–1.3)
GLUCOSE SERPL-MCNC: 88 MG/DL — SIGNIFICANT CHANGE UP (ref 70–99)
HCT VFR BLD CALC: 29.6 % — LOW (ref 39–50)
HGB BLD-MCNC: 9.4 G/DL — LOW (ref 13–17)
MAGNESIUM SERPL-MCNC: 2.2 MG/DL — SIGNIFICANT CHANGE UP (ref 1.6–2.6)
MCHC RBC-ENTMCNC: 31 PG — SIGNIFICANT CHANGE UP (ref 27–34)
MCHC RBC-ENTMCNC: 31.8 % — LOW (ref 32–36)
MCV RBC AUTO: 97.7 FL — SIGNIFICANT CHANGE UP (ref 80–100)
NRBC # FLD: 0 — SIGNIFICANT CHANGE UP
PHOSPHATE SERPL-MCNC: 3.4 MG/DL — SIGNIFICANT CHANGE UP (ref 2.5–4.5)
PLATELET # BLD AUTO: 99 K/UL — LOW (ref 150–400)
PMV BLD: 14.3 FL — HIGH (ref 7–13)
POTASSIUM SERPL-MCNC: 3.9 MMOL/L — SIGNIFICANT CHANGE UP (ref 3.5–5.3)
POTASSIUM SERPL-SCNC: 3.9 MMOL/L — SIGNIFICANT CHANGE UP (ref 3.5–5.3)
RBC # BLD: 3.03 M/UL — LOW (ref 4.2–5.8)
RBC # FLD: 16.9 % — HIGH (ref 10.3–14.5)
SODIUM SERPL-SCNC: 144 MMOL/L — SIGNIFICANT CHANGE UP (ref 135–145)
WBC # BLD: 7.68 K/UL — SIGNIFICANT CHANGE UP (ref 3.8–10.5)
WBC # FLD AUTO: 7.68 K/UL — SIGNIFICANT CHANGE UP (ref 3.8–10.5)

## 2017-10-14 PROCEDURE — 71010: CPT | Mod: 26

## 2017-10-14 RX ORDER — OXYCODONE HYDROCHLORIDE 5 MG/1
10 TABLET ORAL EVERY 4 HOURS
Qty: 0 | Refills: 0 | Status: DISCONTINUED | OUTPATIENT
Start: 2017-10-14 | End: 2017-10-21

## 2017-10-14 RX ORDER — KETOROLAC TROMETHAMINE 30 MG/ML
15 SYRINGE (ML) INJECTION EVERY 4 HOURS
Qty: 0 | Refills: 0 | Status: DISCONTINUED | OUTPATIENT
Start: 2017-10-14 | End: 2017-10-14

## 2017-10-14 RX ORDER — OXYCODONE HYDROCHLORIDE 5 MG/1
5 TABLET ORAL EVERY 4 HOURS
Qty: 0 | Refills: 0 | Status: DISCONTINUED | OUTPATIENT
Start: 2017-10-14 | End: 2017-10-20

## 2017-10-14 RX ADMIN — Medication 325 MILLIGRAM(S): at 13:03

## 2017-10-14 RX ADMIN — BUDESONIDE AND FORMOTEROL FUMARATE DIHYDRATE 2 PUFF(S): 160; 4.5 AEROSOL RESPIRATORY (INHALATION) at 21:27

## 2017-10-14 RX ADMIN — ALBUTEROL 2 PUFF(S): 90 AEROSOL, METERED ORAL at 11:27

## 2017-10-14 RX ADMIN — Medication 325 MILLIGRAM(S): at 21:26

## 2017-10-14 RX ADMIN — ALBUTEROL 2 PUFF(S): 90 AEROSOL, METERED ORAL at 05:51

## 2017-10-14 RX ADMIN — VORICONAZOLE 200 MILLIGRAM(S): 10 INJECTION, POWDER, LYOPHILIZED, FOR SOLUTION INTRAVENOUS at 17:43

## 2017-10-14 RX ADMIN — BUDESONIDE AND FORMOTEROL FUMARATE DIHYDRATE 2 PUFF(S): 160; 4.5 AEROSOL RESPIRATORY (INHALATION) at 11:29

## 2017-10-14 RX ADMIN — Medication 1 MILLIGRAM(S): at 11:28

## 2017-10-14 RX ADMIN — Medication 2 PUFF(S): at 15:12

## 2017-10-14 RX ADMIN — PANTOPRAZOLE SODIUM 40 MILLIGRAM(S): 20 TABLET, DELAYED RELEASE ORAL at 15:10

## 2017-10-14 RX ADMIN — APIXABAN 5 MILLIGRAM(S): 2.5 TABLET, FILM COATED ORAL at 17:43

## 2017-10-14 RX ADMIN — Medication 2 PUFF(S): at 05:53

## 2017-10-14 RX ADMIN — Medication 100 MILLIGRAM(S): at 17:43

## 2017-10-14 RX ADMIN — Medication 0.25 MILLIGRAM(S): at 05:51

## 2017-10-14 RX ADMIN — APIXABAN 5 MILLIGRAM(S): 2.5 TABLET, FILM COATED ORAL at 05:51

## 2017-10-14 RX ADMIN — ALBUTEROL 2 PUFF(S): 90 AEROSOL, METERED ORAL at 17:43

## 2017-10-14 RX ADMIN — ATORVASTATIN CALCIUM 80 MILLIGRAM(S): 80 TABLET, FILM COATED ORAL at 21:26

## 2017-10-14 RX ADMIN — Medication 81 MILLIGRAM(S): at 11:28

## 2017-10-14 RX ADMIN — Medication 0.25 MILLIGRAM(S): at 21:26

## 2017-10-14 RX ADMIN — Medication 40 MILLIGRAM(S): at 05:51

## 2017-10-14 RX ADMIN — Medication 325 MILLIGRAM(S): at 05:51

## 2017-10-14 RX ADMIN — ALBUTEROL 2 PUFF(S): 90 AEROSOL, METERED ORAL at 00:57

## 2017-10-14 RX ADMIN — OXYCODONE HYDROCHLORIDE 10 MILLIGRAM(S): 5 TABLET ORAL at 23:32

## 2017-10-14 RX ADMIN — PANTOPRAZOLE SODIUM 40 MILLIGRAM(S): 20 TABLET, DELAYED RELEASE ORAL at 05:51

## 2017-10-14 RX ADMIN — DIVALPROEX SODIUM 500 MILLIGRAM(S): 500 TABLET, DELAYED RELEASE ORAL at 05:51

## 2017-10-14 RX ADMIN — Medication 2 PUFF(S): at 22:46

## 2017-10-14 RX ADMIN — Medication 0.25 MILLIGRAM(S): at 13:02

## 2017-10-14 RX ADMIN — DIVALPROEX SODIUM 500 MILLIGRAM(S): 500 TABLET, DELAYED RELEASE ORAL at 17:43

## 2017-10-14 RX ADMIN — VORICONAZOLE 200 MILLIGRAM(S): 10 INJECTION, POWDER, LYOPHILIZED, FOR SOLUTION INTRAVENOUS at 05:51

## 2017-10-14 RX ADMIN — ALBUTEROL 2 PUFF(S): 90 AEROSOL, METERED ORAL at 21:27

## 2017-10-14 RX ADMIN — Medication 30 MILLIGRAM(S): at 15:09

## 2017-10-14 RX ADMIN — Medication 100 MILLIGRAM(S): at 05:51

## 2017-10-14 NOTE — PROGRESS NOTE ADULT - SUBJECTIVE AND OBJECTIVE BOX
Patient is a 47y old  Male who presents with a chief complaint of "My leg wouldn't stop shaking." (14 Sep 2017 09:59)  still with air leak on coughing     Any change in ROS:     MEDICATIONS  (STANDING):  ALBUTerol    90 MICROgram(s) HFA Inhaler 2 Puff(s) Inhalation every 4 hours  ALPRAZolam 0.25 milliGRAM(s) Oral three times a day  apixaban 5 milliGRAM(s) Oral every 12 hours  aspirin  chewable 81 milliGRAM(s) Oral daily  atorvastatin 80 milliGRAM(s) Oral at bedtime  buDESOnide 160 MICROgram(s)/formoterol 4.5 MICROgram(s) Inhaler 2 Puff(s) Inhalation every 12 hours  diVALproex  milliGRAM(s) Oral two times a day  docusate sodium 100 milliGRAM(s) Oral two times a day  ferrous    sulfate 325 milliGRAM(s) Oral every 8 hours  folic acid 1 milliGRAM(s) Oral daily  ipratropium 17 MICROgram(s) HFA Inhaler 2 Puff(s) Inhalation every 8 hours  pantoprazole    Tablet 40 milliGRAM(s) Oral two times a day before meals  polyethylene glycol 3350 17 Gram(s) Oral at bedtime  predniSONE   Tablet 40 milliGRAM(s) Oral daily  voriconazole 200 milliGRAM(s) Oral every 12 hours    MEDICATIONS  (PRN):  acetaminophen    Suspension 650 milliGRAM(s) Oral every 6 hours PRN For Temp greater than 38 C (100.4 F)  acetaminophen   Tablet. 650 milliGRAM(s) Oral every 6 hours PRN Mild Pain (1 - 3)  aluminum hydroxide/magnesium hydroxide/simethicone Suspension 30 milliLiter(s) Oral every 6 hours PRN Dyspepsia    Vital Signs Last 24 Hrs  T(C): 36.9 (14 Oct 2017 12:29), Max: 37 (13 Oct 2017 16:49)  T(F): 98.4 (14 Oct 2017 12:29), Max: 98.6 (13 Oct 2017 16:49)  HR: 92 (14 Oct 2017 12:29) (58 - 92)  BP: 108/66 (14 Oct 2017 12:29) (100/53 - 116/73)  BP(mean): --  RR: 18 (14 Oct 2017 12:29) (18 - 18)  SpO2: 99% (14 Oct 2017 12:29) (97% - 100%)    I&O's Summary    13 Oct 2017 07:01  -  14 Oct 2017 07:00  --------------------------------------------------------  IN: 0 mL / OUT: 731 mL / NET: -731 mL    14 Oct 2017 07:01  -  14 Oct 2017 13:27  --------------------------------------------------------  IN: 0 mL / OUT: 300 mL / NET: -300 mL          Physical Exam:   GENERAL: NAD, well-groomed, well-developed  HEENT: GIOVANI/   Atraumatic, Normocephalic  ENMT: No tonsillar erythema, exudates, or enlargement; Moist mucous membranes, Good dentition, No lesions  NECK: Supple, No JVD, Normal thyroid  CHEST/LUNG: not much of wheezing   CVS: Regular rate and rhythm; No murmurs, rubs, or gallops  GI: : Soft, Nontender, Nondistended; Bowel sounds present  NERVOUS SYSTEM:  Alert & Oriented X3  EXTREMITIES:  2+ Peripheral Pulses, No clubbing, cyanosis, or edema  LYMPH: No lymphadenopathy noted  SKIN: No rashes or lesions  ENDOCRINOLOGY: No Thyromegaly  PSYCH: Appropriate    Labs:                              9.4    7.68  )-----------( 99       ( 14 Oct 2017 05:30 )             29.6                         9.6    9.82  )-----------( 118      ( 13 Oct 2017 06:54 )             31.3                         8.0    15.43 )-----------( 118      ( 12 Oct 2017 05:00 )             25.0                         9.6    16.79 )-----------( 153      ( 11 Oct 2017 04:45 )             30.2     10-14    144  |  104  |  25<H>  ----------------------------<  88  3.9   |  32<H>  |  0.97  10-13    143  |  102  |  18  ----------------------------<  92  4.3   |  31  |  0.92  10-12    142  |  105  |  20  ----------------------------<  117<H>  5.0   |  26  |  0.94  10-11    144  |  102  |  16  ----------------------------<  161<H>  4.3   |  32<H>  |  0.85    Ca    8.2<L>      14 Oct 2017 05:30  Ca    8.7      13 Oct 2017 06:54  Phos  3.4     10-14  Phos  3.2     10-13  Mg     2.2     10-14  Mg     2.1     10-13      CAPILLARY BLOOD GLUCOSE                Cultures:   < from: Xray Chest 1 View AP- PORTABLE-Urgent (10.13.17 @ 13:14) >    FINDINGS:  Left pigtail catheter and is unchanged position. IABP overlies the aortic   arch.  Patchy and nodular opacities in the bilateral lungs are unchanged.   Surgical changes of the left lung.  Increased left pneumothorax, which is now moderate in size.  The cardiomediastinal silhouette is not well evaluated on this projection.  The visualized osseous and soft tissue structures demonstrate no acute   pathology.    IMPRESSION:   Increased left pneumothorax, which is now moderate in size.    These findings were discussed with Dr. Seaman at 10/13/2017 3:29 PM with   read back confirmation.              MARYAM RODRIGUEZ M.D., RADIOLOGY RESIDENT  This document has been electronically signed.  NURA GUERRERO M.D., ATTENDING RADIOLOGIST  This document has been electronically signed. Oct 13 2017  5:39PM        < end of copied text >                            Studies  Chest X-RAY  CT SCAN Chest   Venous Dopplers: LE:   CT Abdomen  Others

## 2017-10-14 NOTE — PROGRESS NOTE ADULT - SUBJECTIVE AND OBJECTIVE BOX
Subjective: no events overnight  Pt feeling well, but c/o drainage from old right chest tube site    Vital Signs:  Vital Signs Last 24 Hrs  T(C): 36.9 (10-14-17 @ 12:29), Max: 37 (10-13-17 @ 16:49)  T(F): 98.4 (10-14-17 @ 12:29), Max: 98.6 (10-13-17 @ 16:49)  HR: 92 (10-14-17 @ 12:29) (58 - 92)  BP: 108/66 (10-14-17 @ 12:29) (100/53 - 116/73)  RR: 18 (10-14-17 @ 12:29) (18 - 18)  SpO2: 99% (10-14-17 @ 12:29) (97% - 100%) on (O2)    Telemetry/Alarms:  General: WN/WD NAD  Neurology: Awake, nonfocal, BETH x 4  Eyes: Scleras clear, PERRLA/ EOMI, Gross vision intact  ENT:Gross hearing intact, grossly patent pharynx, no stridor  Neck: Neck supple, trachea midline, No JVD,   Respiratory: CTA B/L, No wheezing, rales, rhonchi  CV: RRR, S1S2, no murmurs, rubs or gallops  Abdominal: Soft, NT, ND +BS,   Extremities: No edema, + peripheral pulses  Skin: No Rashes, Hematoma, Ecchymosis  Lymphatic: No Neck, axilla, groin LAD  Psych: Oriented x 3, normal affect  Incisions:   Tubes:  Relevant labs, radiology and Medications reviewed                        9.4    7.68  )-----------( 99       ( 14 Oct 2017 05:30 )             29.6     10-14    144  |  104  |  25<H>  ----------------------------<  88  3.9   |  32<H>  |  0.97    Ca    8.2<L>      14 Oct 2017 05:30  Phos  3.4     10-14  Mg     2.2     10-14        MEDICATIONS  (STANDING):  ALBUTerol    90 MICROgram(s) HFA Inhaler 2 Puff(s) Inhalation every 4 hours  ALPRAZolam 0.25 milliGRAM(s) Oral three times a day  apixaban 5 milliGRAM(s) Oral every 12 hours  aspirin  chewable 81 milliGRAM(s) Oral daily  atorvastatin 80 milliGRAM(s) Oral at bedtime  buDESOnide 160 MICROgram(s)/formoterol 4.5 MICROgram(s) Inhaler 2 Puff(s) Inhalation every 12 hours  diVALproex  milliGRAM(s) Oral two times a day  docusate sodium 100 milliGRAM(s) Oral two times a day  ferrous    sulfate 325 milliGRAM(s) Oral every 8 hours  folic acid 1 milliGRAM(s) Oral daily  ipratropium 17 MICROgram(s) HFA Inhaler 2 Puff(s) Inhalation every 8 hours  pantoprazole    Tablet 40 milliGRAM(s) Oral two times a day before meals  polyethylene glycol 3350 17 Gram(s) Oral at bedtime  predniSONE   Tablet   Oral   predniSONE   Tablet 30 milliGRAM(s) Oral daily  voriconazole 200 milliGRAM(s) Oral every 12 hours    MEDICATIONS  (PRN):  acetaminophen    Suspension 650 milliGRAM(s) Oral every 6 hours PRN For Temp greater than 38 C (100.4 F)  acetaminophen   Tablet. 650 milliGRAM(s) Oral every 6 hours PRN Mild Pain (1 - 3)  aluminum hydroxide/magnesium hydroxide/simethicone Suspension 30 milliLiter(s) Oral every 6 hours PRN Dyspepsia    Pertinent Physical Exam  I&O's Summary    13 Oct 2017 07:01  -  14 Oct 2017 07:00  --------------------------------------------------------  IN: 0 mL / OUT: 731 mL / NET: -731 mL    14 Oct 2017 07:01  -  14 Oct 2017 15:24  --------------------------------------------------------  IN: 0 mL / OUT: 300 mL / NET: -300 mL        Assessment  47y Male  w/ PAST MEDICAL & SURGICAL HISTORY:  History of pneumothorax: History of 3 prior pneumonthoracies.  COPD (chronic obstructive pulmonary disease)  Asthma  Hypertension  Sarcoidosis  No significant past surgical history  admitted with complaints of Patient is a 47y old  Male who presents with a chief complaint of "My leg wouldn't stop shaking." (14 Sep 2017 09:59)  .  patient underwent Thoracotomy  Central venous catheter placement with ultrasound guidance  Thoracoscopic pneumolysis  Thoracotomy, with lung lobectomy  Bronchoscopy with bronchoalveolar lavage of both sides  . Postoperative course/issues:    PLAN  Neuro: Pain management  Pulm: Encourage coughing, deep breathing and use of incentive spirometry. Wean off supplemental oxygen as able. Daily CXR.   Cardio: Monitor telemetry/alarms  GI: Tolerating diet. Continue stool softeners.  Renal: monitor urine output, supplement electrolytes as needed  Vasc: Heparin SC/SCDs for DVT prophylaxis  Heme: Stable H/H. .   ID: Off antibiotics. Stable.  Therapy: OOB/ambulate  Tubes: Monitor Pigtail output. Will keep pigtail to suction  Will suture old chest tube site closed per Dr Alvarado,  Disposition: Aim to D/C to home on  Discussed with Cardiothoracic Team at AM rounds.

## 2017-10-14 NOTE — PROGRESS NOTE ADULT - PROBLEM SELECTOR PLAN 1
S/P surgery : reop twice secondary to hemothroax: currently on pressors and ventilator support: He is wheezing extensively: started on IV steroids 20 mg twice a day , but should be given only for few days : follow his wheezing daily as soon as it is resolved; dc steroids For now their is no hemoptysis: He had twice reop yesterday secondary to hemothorax and bleeding from chest tube: currently on pressors and seated and on broad spectrum antibiotics.  9/27: he seems to be doing better today: for extubation  9/28: extubated: alert and awake and responding: He is on zosyn any way with voriconazole!  9/29: chest x-ray today , to me looks better then yesterday: There is no official report yet:  10/02: doing ok  10/3: on eliquis: toelrating well: no hemoptysis  10/4 : stable  10/05: persistent left upper air space following DAVID lobectomy: defer to thoracic surgery  10/06: stable?: no HEMOPTYSIS  10/7: resolved  10/8: cont prednisone: pneumothorax has resolved  10/09: resolved  10/10: no more hemoptysis  1/11: resolved after surgery and despite eliquis: stable  11/12: stable: s./p surgery: has mod pneumothorax: on the elft side: would defer to ct surgery: back on suction!  10/13: doing   ok  10/14: decrease dose to 30 mg for a few days and then reduce it to 20 mg: increase ptx today on chest radiograph: ines Caldera

## 2017-10-14 NOTE — PROGRESS NOTE ADULT - PROBLEM SELECTOR PLAN 3
wheezing is present :pk pressures in 30's : started him on steroids again  bronchodilators ATC:  9/27; if he continues to show improvement: can decrease his solumedrol tomorrow  9/28: decreases his IV steroids to 20 mg once a day for a few days more  9/29: on 20 mg per day of solumedrol: would continue for few more days and then decrease it  9/30: doing ok: - wheezing: decrease his steroids to 10 mg a day from tomorrow  10/1: change to po prednisone 10 mg  10/02; cont po prednisone  10/3: stop prednisone after today,s dose?  10/4 : dc prednisone after tomorrow dose  10/5: DC prednisone today  10/6: OF PREDNISONE NOW  10/07: has sarcoidosis too: SOB with poor resp excursion: would start him on 20 mg of prednisone  10/8: cont prednisone with BD  10/09: his wheezing has increased today: Would change to IV METHYLPREDNISOLONE: 20 MG Q 8 HOURS  1/10was on 40 mg three times a day: dw icu attending , to decrease it to 20 q 8 hours  11/11: would change to po prednisone 40 mg daily  10/12: on steroids for now with slow taper  10/13: decrease prednisone to 30 mg tomorrow  10/14: prednisone 30 md every day : and then cut down to 20 mg

## 2017-10-15 LAB
BUN SERPL-MCNC: 19 MG/DL — SIGNIFICANT CHANGE UP (ref 7–23)
CALCIUM SERPL-MCNC: 8.4 MG/DL — SIGNIFICANT CHANGE UP (ref 8.4–10.5)
CHLORIDE SERPL-SCNC: 102 MMOL/L — SIGNIFICANT CHANGE UP (ref 98–107)
CO2 SERPL-SCNC: 30 MMOL/L — SIGNIFICANT CHANGE UP (ref 22–31)
CREAT SERPL-MCNC: 0.82 MG/DL — SIGNIFICANT CHANGE UP (ref 0.5–1.3)
GLUCOSE SERPL-MCNC: 106 MG/DL — HIGH (ref 70–99)
HCT VFR BLD CALC: 31.7 % — LOW (ref 39–50)
HGB BLD-MCNC: 9.8 G/DL — LOW (ref 13–17)
MAGNESIUM SERPL-MCNC: 2.3 MG/DL — SIGNIFICANT CHANGE UP (ref 1.6–2.6)
MCHC RBC-ENTMCNC: 30.4 PG — SIGNIFICANT CHANGE UP (ref 27–34)
MCHC RBC-ENTMCNC: 30.9 % — LOW (ref 32–36)
MCV RBC AUTO: 98.4 FL — SIGNIFICANT CHANGE UP (ref 80–100)
NRBC # FLD: 0 — SIGNIFICANT CHANGE UP
PHOSPHATE SERPL-MCNC: 3.5 MG/DL — SIGNIFICANT CHANGE UP (ref 2.5–4.5)
PLATELET # BLD AUTO: 94 K/UL — LOW (ref 150–400)
PMV BLD: 13.9 FL — HIGH (ref 7–13)
POTASSIUM SERPL-MCNC: 4.7 MMOL/L — SIGNIFICANT CHANGE UP (ref 3.5–5.3)
POTASSIUM SERPL-SCNC: 4.7 MMOL/L — SIGNIFICANT CHANGE UP (ref 3.5–5.3)
RBC # BLD: 3.22 M/UL — LOW (ref 4.2–5.8)
RBC # FLD: 16.6 % — HIGH (ref 10.3–14.5)
SODIUM SERPL-SCNC: 142 MMOL/L — SIGNIFICANT CHANGE UP (ref 135–145)
WBC # BLD: 8.77 K/UL — SIGNIFICANT CHANGE UP (ref 3.8–10.5)
WBC # FLD AUTO: 8.77 K/UL — SIGNIFICANT CHANGE UP (ref 3.8–10.5)

## 2017-10-15 PROCEDURE — 71010: CPT | Mod: 26

## 2017-10-15 RX ADMIN — Medication 0.25 MILLIGRAM(S): at 05:19

## 2017-10-15 RX ADMIN — DIVALPROEX SODIUM 500 MILLIGRAM(S): 500 TABLET, DELAYED RELEASE ORAL at 05:19

## 2017-10-15 RX ADMIN — Medication 325 MILLIGRAM(S): at 21:46

## 2017-10-15 RX ADMIN — BUDESONIDE AND FORMOTEROL FUMARATE DIHYDRATE 2 PUFF(S): 160; 4.5 AEROSOL RESPIRATORY (INHALATION) at 21:45

## 2017-10-15 RX ADMIN — APIXABAN 5 MILLIGRAM(S): 2.5 TABLET, FILM COATED ORAL at 18:11

## 2017-10-15 RX ADMIN — APIXABAN 5 MILLIGRAM(S): 2.5 TABLET, FILM COATED ORAL at 05:19

## 2017-10-15 RX ADMIN — OXYCODONE HYDROCHLORIDE 10 MILLIGRAM(S): 5 TABLET ORAL at 00:30

## 2017-10-15 RX ADMIN — VORICONAZOLE 200 MILLIGRAM(S): 10 INJECTION, POWDER, LYOPHILIZED, FOR SOLUTION INTRAVENOUS at 18:11

## 2017-10-15 RX ADMIN — Medication 100 MILLIGRAM(S): at 05:19

## 2017-10-15 RX ADMIN — PANTOPRAZOLE SODIUM 40 MILLIGRAM(S): 20 TABLET, DELAYED RELEASE ORAL at 05:19

## 2017-10-15 RX ADMIN — OXYCODONE HYDROCHLORIDE 10 MILLIGRAM(S): 5 TABLET ORAL at 21:46

## 2017-10-15 RX ADMIN — Medication 0.25 MILLIGRAM(S): at 21:46

## 2017-10-15 RX ADMIN — Medication 0.25 MILLIGRAM(S): at 13:38

## 2017-10-15 RX ADMIN — Medication 2 PUFF(S): at 18:12

## 2017-10-15 RX ADMIN — Medication 2 PUFF(S): at 08:59

## 2017-10-15 RX ADMIN — Medication 1 MILLIGRAM(S): at 13:39

## 2017-10-15 RX ADMIN — ALBUTEROL 2 PUFF(S): 90 AEROSOL, METERED ORAL at 08:58

## 2017-10-15 RX ADMIN — Medication 100 MILLIGRAM(S): at 18:11

## 2017-10-15 RX ADMIN — BUDESONIDE AND FORMOTEROL FUMARATE DIHYDRATE 2 PUFF(S): 160; 4.5 AEROSOL RESPIRATORY (INHALATION) at 08:58

## 2017-10-15 RX ADMIN — ALBUTEROL 2 PUFF(S): 90 AEROSOL, METERED ORAL at 01:14

## 2017-10-15 RX ADMIN — ALBUTEROL 2 PUFF(S): 90 AEROSOL, METERED ORAL at 05:22

## 2017-10-15 RX ADMIN — Medication 325 MILLIGRAM(S): at 13:40

## 2017-10-15 RX ADMIN — Medication 325 MILLIGRAM(S): at 05:19

## 2017-10-15 RX ADMIN — ALBUTEROL 2 PUFF(S): 90 AEROSOL, METERED ORAL at 13:41

## 2017-10-15 RX ADMIN — VORICONAZOLE 200 MILLIGRAM(S): 10 INJECTION, POWDER, LYOPHILIZED, FOR SOLUTION INTRAVENOUS at 05:19

## 2017-10-15 RX ADMIN — Medication 30 MILLIGRAM(S): at 05:19

## 2017-10-15 RX ADMIN — ALBUTEROL 2 PUFF(S): 90 AEROSOL, METERED ORAL at 18:13

## 2017-10-15 RX ADMIN — OXYCODONE HYDROCHLORIDE 10 MILLIGRAM(S): 5 TABLET ORAL at 22:46

## 2017-10-15 RX ADMIN — ALBUTEROL 2 PUFF(S): 90 AEROSOL, METERED ORAL at 21:45

## 2017-10-15 RX ADMIN — PANTOPRAZOLE SODIUM 40 MILLIGRAM(S): 20 TABLET, DELAYED RELEASE ORAL at 18:11

## 2017-10-15 RX ADMIN — ATORVASTATIN CALCIUM 80 MILLIGRAM(S): 80 TABLET, FILM COATED ORAL at 21:46

## 2017-10-15 RX ADMIN — Medication 81 MILLIGRAM(S): at 13:40

## 2017-10-15 RX ADMIN — DIVALPROEX SODIUM 500 MILLIGRAM(S): 500 TABLET, DELAYED RELEASE ORAL at 18:11

## 2017-10-15 NOTE — PROGRESS NOTE ADULT - PROBLEM SELECTOR PLAN 6
on steroids at this time: it was given only for a short duration: thinking his wheezing is secondary to rhinovirus infection: It will cont till tomorrow: thereafter would taper it down to off in next 2-3  10/1: change prednisone to 10 mg daily  10/02: cont prednisone  10/03: trop prednisone after today's dose  10/4 stop prednisone tomorrow  10/5: dc prednisone today : pt was not on any treatment for sarcoidosis before  10/06: OFF PREDNISONE NOW  10/7: start prednisone 20 mg per day  10/09: started back on IV steroids given his increased wheezing!!  10/10: wheezing better: decrease steroids to 20 mg q 8  10//: decrease and change prednisone to 40 mg daily: probably will be discharged on some prednisone  10/13: decrease prednisone to 30 mg tomorrow  10/15: on 30 mg of prednisone

## 2017-10-15 NOTE — PROGRESS NOTE ADULT - PROBLEM SELECTOR PLAN 3
9/27; if he continues to show improvement: can decrease his solumedrol tomorrow  9/28: decreases his IV steroids to 20 mg once a day for a few days more  9/29: on 20 mg per day of solumedrol: would continue for few more days and then decrease it  9/30: doing ok: - wheezing: decrease his steroids to 10 mg a day from tomorrow  10/1: change to po prednisone 10 mg  10/02; cont po prednisone  10/3: stop prednisone after today,s dose?  10/4 : dc prednisone after tomorrow dose  10/5: DC prednisone today  10/6: OF PREDNISONE NOW  10/07: has sarcoidosis too: SOB with poor resp excursion: would start him on 20 mg of prednisone  10/8: cont prednisone with BD  10/09: his wheezing has increased today: Would change to IV METHYLPREDNISOLONE: 20 MG Q 8 HOURS  1/10was on 40 mg three times a day: dw icu attending , to decrease it to 20 q 8 hours  11/11: would change to po prednisone 40 mg daily  10/12: on steroids for now with slow taper  10/13: decrease prednisone to 30 mg tomorrow  10/14: prednisone 30 md every day : and then cut down to 20 mg  10/15: cont prednisone

## 2017-10-15 NOTE — PROGRESS NOTE ADULT - SUBJECTIVE AND OBJECTIVE BOX
Subjective:    Vital Signs:  Vital Signs Last 24 Hrs  T(C): 36.9 (10-15-17 @ 08:53), Max: 37.1 (10-14-17 @ 17:39)  T(F): 98.4 (10-15-17 @ 08:53), Max: 98.8 (10-14-17 @ 17:39)  HR: 83 (10-15-17 @ 08:53) (69 - 92)  BP: 121/63 (10-15-17 @ 08:53) (108/66 - 125/85)  RR: 18 (10-15-17 @ 08:53) (18 - 18)  SpO2: 100% (10-15-17 @ 08:53) (98% - 100%) on (O2)    Telemetry/Alarms:  General: WN/WD NAD  Neurology: Awake, nonfocal, BETH x 4  Eyes: Scleras clear, PERRLA/ EOMI, Gross vision intact  ENT:Gross hearing intact, grossly patent pharynx, no stridor  Neck: Neck supple, trachea midline, No JVD,   Respiratory: CTA B/L, No wheezing, rales, rhonchi  CV: RRR, S1S2, no murmurs, rubs or gallops  Abdominal: Soft, NT, ND +BS,   Extremities: No edema, + peripheral pulses  Skin: No Rashes, Hematoma, Ecchymosis  Lymphatic: No Neck, axilla, groin LAD  Psych: Oriented x 3, normal affect  Incisions:   Tubes:  Relevant labs, radiology and Medications reviewed                        9.8    8.77  )-----------( 94       ( 15 Oct 2017 05:45 )             31.7     10-15    142  |  102  |  19  ----------------------------<  106<H>  4.7   |  30  |  0.82    Ca    8.4      15 Oct 2017 05:45  Phos  3.5     10-15  Mg     2.3     10-15        MEDICATIONS  (STANDING):  ALBUTerol    90 MICROgram(s) HFA Inhaler 2 Puff(s) Inhalation every 4 hours  ALPRAZolam 0.25 milliGRAM(s) Oral three times a day  apixaban 5 milliGRAM(s) Oral every 12 hours  aspirin  chewable 81 milliGRAM(s) Oral daily  atorvastatin 80 milliGRAM(s) Oral at bedtime  buDESOnide 160 MICROgram(s)/formoterol 4.5 MICROgram(s) Inhaler 2 Puff(s) Inhalation every 12 hours  diVALproex  milliGRAM(s) Oral two times a day  docusate sodium 100 milliGRAM(s) Oral two times a day  ferrous    sulfate 325 milliGRAM(s) Oral every 8 hours  folic acid 1 milliGRAM(s) Oral daily  ipratropium 17 MICROgram(s) HFA Inhaler 2 Puff(s) Inhalation every 8 hours  pantoprazole    Tablet 40 milliGRAM(s) Oral two times a day before meals  polyethylene glycol 3350 17 Gram(s) Oral at bedtime  predniSONE   Tablet   Oral   predniSONE   Tablet 30 milliGRAM(s) Oral daily  voriconazole 200 milliGRAM(s) Oral every 12 hours    MEDICATIONS  (PRN):  acetaminophen    Suspension 650 milliGRAM(s) Oral every 6 hours PRN For Temp greater than 38 C (100.4 F)  acetaminophen   Tablet. 650 milliGRAM(s) Oral every 6 hours PRN Mild Pain (1 - 3)  aluminum hydroxide/magnesium hydroxide/simethicone Suspension 30 milliLiter(s) Oral every 6 hours PRN Dyspepsia  ketorolac   Injectable 15 milliGRAM(s) IV Push every 4 hours PRN breakthrough pain  oxyCODONE    IR 5 milliGRAM(s) Oral every 4 hours PRN Moderate Pain (4 - 6)  oxyCODONE    IR 10 milliGRAM(s) Oral every 4 hours PRN Severe Pain (7 - 10)    Pertinent Physical Exam  I&O's Summary    14 Oct 2017 07:01  -  15 Oct 2017 07:00  --------------------------------------------------------  IN: 0 mL / OUT: 326 mL / NET: -326 mL    15 Oct 2017 07:01  -  15 Oct 2017 09:30  --------------------------------------------------------  IN: 0 mL / OUT: 10 mL / NET: -10 mL        Assessment  47y Male  w/ PAST MEDICAL & SURGICAL HISTORY:  History of pneumothorax: History of 3 prior pneumonthoracies.  COPD (chronic obstructive pulmonary disease)  Asthma  Hypertension  Sarcoidosis  No significant past surgical history  admitted with complaints of Patient is a 47y old  Male who presents with a chief complaint of "My leg wouldn't stop shaking." (14 Sep 2017 09:59)  .  On (Date), patient underwent Thoracotomy  Central venous catheter placement with ultrasound guidance  Thoracoscopic pneumolysis  Thoracotomy, with lung lobectomy  Bronchoscopy with bronchoalveolar lavage of both sides  . Postoperative course/issues:    PLAN  Neuro: Pain management  Pulm: Encourage coughing, deep breathing and use of incentive spirometry. Wean off supplemental oxygen as able. Daily CXR.   Cardio: Monitor telemetry/alarms  GI: Tolerating diet. Continue stool softeners.  Renal: monitor urine output, supplement electrolytes as needed  Vasc: Heparin SC/SCDs for DVT prophylaxis  Heme: Stable H/H. .   ID: Off antibiotics. Stable.  Therapy: OOB/ambulate  Tubes: Monitor Chest tube output  Disposition: Aim to D/C to home on  Discussed with Cardiothoracic Team at AM rounds. Subjective:    Vital Signs: Hemodynamically stable and afebrile  Vital Signs Last 24 Hrs  T(C): 36.9 (10-15-17 @ 08:53), Max: 37.1 (10-14-17 @ 17:39)  T(F): 98.4 (10-15-17 @ 08:53), Max: 98.8 (10-14-17 @ 17:39)  HR: 83 (10-15-17 @ 08:53) (69 - 92)  BP: 121/63 (10-15-17 @ 08:53) (108/66 - 125/85)  RR: 18 (10-15-17 @ 08:53) (18 - 18)  SpO2: 100% (10-15-17 @ 08:53) (98% - 100%) on 21%O2    Telemetry/Alarms: nNSR  General: WN/WD NAD  Neurology: Awake, nonfocal, BETH x 4  Eyes: Scleras clear, PERRLA/ EOMI, Gross vision intact  ENT:Gross hearing intact, grossly patent pharynx, no stridor  Neck: Neck supple, trachea midline, No JVD,   Respiratory: Decreased Rt > Lt  B/L, scattered   rhonchi  CV: RRR, S1S2, no murmurs, rubs or gallops  Abdominal: Soft, NT, ND +BS,   Extremities: Improved edema, + peripheral pulses, no calf pain or tenderness  Skin: No Rashes, Hematoma, Ecchymosis  Lymphatic: No Neck, axilla, groin LAD  Psych: Oriented x 3, normal affect  Incisions: No s-s of infection    Tubes: Lt PTC to suction, + air leak, drained 25ml  Relevant labs, radiology->There is a left pigtail pleural catheter as on the prior study. There is a small pneumothorax with interval decrease in size. Bilateral chronic lung changes are noted as on the prior study. The patient is status post left lung surgery with postoperative changes. and Medications reviewed                        9.8    8.77  )-----------( 94       ( 15 Oct 2017 05:45 )             31.7     10-15    142  |  102  |  19  ----------------------------<  106<H>  4.7   |  30  |  0.82    Ca    8.4      15 Oct 2017 05:45  Phos  3.5     10-15  Mg     2.3     10-15        MEDICATIONS  (STANDING):  ALBUTerol    90 MICROgram(s) HFA Inhaler 2 Puff(s) Inhalation every 4 hours  ALPRAZolam 0.25 milliGRAM(s) Oral three times a day  apixaban 5 milliGRAM(s) Oral every 12 hours  aspirin  chewable 81 milliGRAM(s) Oral daily  atorvastatin 80 milliGRAM(s) Oral at bedtime  buDESOnide 160 MICROgram(s)/formoterol 4.5 MICROgram(s) Inhaler 2 Puff(s) Inhalation every 12 hours  diVALproex  milliGRAM(s) Oral two times a day  docusate sodium 100 milliGRAM(s) Oral two times a day  ferrous    sulfate 325 milliGRAM(s) Oral every 8 hours  folic acid 1 milliGRAM(s) Oral daily  ipratropium 17 MICROgram(s) HFA Inhaler 2 Puff(s) Inhalation every 8 hours  pantoprazole    Tablet 40 milliGRAM(s) Oral two times a day before meals  polyethylene glycol 3350 17 Gram(s) Oral at bedtime  predniSONE   Tablet   Oral   predniSONE   Tablet 30 milliGRAM(s) Oral daily  voriconazole 200 milliGRAM(s) Oral every 12 hours    MEDICATIONS  (PRN):  acetaminophen    Suspension 650 milliGRAM(s) Oral every 6 hours PRN For Temp greater than 38 C (100.4 F)  acetaminophen   Tablet. 650 milliGRAM(s) Oral every 6 hours PRN Mild Pain (1 - 3)  aluminum hydroxide/magnesium hydroxide/simethicone Suspension 30 milliLiter(s) Oral every 6 hours PRN Dyspepsia  ketorolac   Injectable 15 milliGRAM(s) IV Push every 4 hours PRN breakthrough pain  oxyCODONE    IR 5 milliGRAM(s) Oral every 4 hours PRN Moderate Pain (4 - 6)  oxyCODONE    IR 10 milliGRAM(s) Oral every 4 hours PRN Severe Pain (7 - 10)    Pertinent Physical Exam  I&O's Summary    14 Oct 2017 07:01  -  15 Oct 2017 07:00  --------------------------------------------------------  IN: 0 mL / OUT: 326 mL / NET: -326 mL    15 Oct 2017 07:01  -  15 Oct 2017 09:30  --------------------------------------------------------  IN: 0 mL / OUT: 10 mL / NET: -10 mL        Assessment  47y Male  w/ PAST MEDICAL & SURGICAL HISTORY:  History of pneumothorax: History of 3 prior pneumonthoracies.  COPD (chronic obstructive pulmonary disease)  Asthma  Hypertension  Sarcoidosis  No significant past surgical history  admitted with complaints of Patient is a 47y old  Male who presents with a chief complaint of "My leg wouldn't stop shaking." (14 Sep 2017 09:59)  .  On 9/21+24+ 25/17, patient underwent Thoracotomy-Central venous catheter placement with ultrasound guidance- Thoracoscopic pneumolysis - Thoracotomy, with lung lobectomy= Bronchoscopy with bronchoalveolar lavage of both sides  Postoperative course/issues:PAF- AC on hold due to hemoptysis; 9/21-Intraop: Bleeding from DAVID (non-lingular)-Recom IR to embolize; 9/22 CT angio done per IR [facility] request.  Formal PFTS and ask Card to fix PFO now to decrease blood flow; Pt on thoracic service now -  to CTICU & OR on 9/24; 9/24: 1400ml EBL in OR; 10 u PRBCs; CTI- DAVID space despite -40 Sxn, MEYER, L Hemiparesis (CVA) better w walking; F/u ID- Voriconazole to 10/24; Plan acute rehab 10/6 IR L PTC for PTX; 10/13 WS today if CXR ok -- incr PTX --> back to suction at all times 10/14: sutured leaking old chest tube site. Keep ct to suction. 10/0 Uneventful PTC remains on suction with air leak    PLAN  Neuro: Pain management  Pulm: Encourage coughing, deep breathing and use of incentive spirometry.   Daily CXR.   Cardio: Monitor telemetry/alarms  GI: Tolerating diet. Continue stool softeners.  Renal: monitor urine output, supplement electrolytes as needed  Vasc: Heparin SC/SCDs for DVT prophylaxis  Heme: Stable H/H. .   ID: Off antibiotics. Stable.  Therapy: OOB/ambulate  Tubes: Continue PTC to suction Monitor Chest tube output  Disposition: Aim to D/C to home on  Discussed with Cardiothoracic Team at AM rounds.

## 2017-10-15 NOTE — PROGRESS NOTE ADULT - SUBJECTIVE AND OBJECTIVE BOX
Patient is a 47y old  Male who presents with a chief complaint of "My leg wouldn't stop shaking." (14 Sep 2017 09:59)  doing ok  walking around  not much wheezing today  air leak +    Any change in ROS:     MEDICATIONS  (STANDING):  ALBUTerol    90 MICROgram(s) HFA Inhaler 2 Puff(s) Inhalation every 4 hours  ALPRAZolam 0.25 milliGRAM(s) Oral three times a day  apixaban 5 milliGRAM(s) Oral every 12 hours  aspirin  chewable 81 milliGRAM(s) Oral daily  atorvastatin 80 milliGRAM(s) Oral at bedtime  buDESOnide 160 MICROgram(s)/formoterol 4.5 MICROgram(s) Inhaler 2 Puff(s) Inhalation every 12 hours  diVALproex  milliGRAM(s) Oral two times a day  docusate sodium 100 milliGRAM(s) Oral two times a day  ferrous    sulfate 325 milliGRAM(s) Oral every 8 hours  folic acid 1 milliGRAM(s) Oral daily  ipratropium 17 MICROgram(s) HFA Inhaler 2 Puff(s) Inhalation every 8 hours  pantoprazole    Tablet 40 milliGRAM(s) Oral two times a day before meals  polyethylene glycol 3350 17 Gram(s) Oral at bedtime  predniSONE   Tablet   Oral   predniSONE   Tablet 30 milliGRAM(s) Oral daily  voriconazole 200 milliGRAM(s) Oral every 12 hours    MEDICATIONS  (PRN):  acetaminophen    Suspension 650 milliGRAM(s) Oral every 6 hours PRN For Temp greater than 38 C (100.4 F)  acetaminophen   Tablet. 650 milliGRAM(s) Oral every 6 hours PRN Mild Pain (1 - 3)  aluminum hydroxide/magnesium hydroxide/simethicone Suspension 30 milliLiter(s) Oral every 6 hours PRN Dyspepsia  ketorolac   Injectable 15 milliGRAM(s) IV Push every 4 hours PRN breakthrough pain  oxyCODONE    IR 5 milliGRAM(s) Oral every 4 hours PRN Moderate Pain (4 - 6)  oxyCODONE    IR 10 milliGRAM(s) Oral every 4 hours PRN Severe Pain (7 - 10)    Vital Signs Last 24 Hrs  T(C): 36.7 (15 Oct 2017 12:57), Max: 37.1 (14 Oct 2017 17:39)  T(F): 98.1 (15 Oct 2017 12:57), Max: 98.8 (14 Oct 2017 17:39)  HR: 94 (15 Oct 2017 12:57) (69 - 94)  BP: 113/70 (15 Oct 2017 12:57) (113/67 - 125/85)  BP(mean): --  RR: 18 (15 Oct 2017 12:57) (18 - 18)  SpO2: 96% (15 Oct 2017 12:57) (96% - 100%)    I&O's Summary    14 Oct 2017 07:01  -  15 Oct 2017 07:00  --------------------------------------------------------  IN: 0 mL / OUT: 326 mL / NET: -326 mL    15 Oct 2017 07:01  -  15 Oct 2017 14:19  --------------------------------------------------------  IN: 0 mL / OUT: 520 mL / NET: -520 mL          Physical Exam:   GENERAL: NAD, well-groomed, well-developed  HEENT: GIOVANI/   Atraumatic, Normocephalic  ENMT: No tonsillar erythema, exudates, or enlargement; Moist mucous membranes, Good dentition, No lesions  NECK: Supple, No JVD, Normal thyroid  CHEST/LUNG: Coarse rhonchi  CVS: Regular rate and rhythm; No murmurs, rubs, or gallops  GI: : Soft, Nontender, Nondistended; Bowel sounds present  NERVOUS SYSTEM:  Alert & Oriented X3  EXTREMITIES:  2+ Peripheral Pulses, No clubbing, cyanosis, or edema  LYMPH: No lymphadenopathy noted  SKIN: No rashes or lesions  ENDOCRINOLOGY: No Thyromegaly  PSYCH: Appropriate    Labs:                              9.8    8.77  )-----------( 94       ( 15 Oct 2017 05:45 )             31.7                         9.4    7.68  )-----------( 99       ( 14 Oct 2017 05:30 )             29.6                         9.6    9.82  )-----------( 118      ( 13 Oct 2017 06:54 )             31.3                         8.0    15.43 )-----------( 118      ( 12 Oct 2017 05:00 )             25.0     10-15    142  |  102  |  19  ----------------------------<  106<H>  4.7   |  30  |  0.82  10-14    144  |  104  |  25<H>  ----------------------------<  88  3.9   |  32<H>  |  0.97  10-13    143  |  102  |  18  ----------------------------<  92  4.3   |  31  |  0.92  10-12    142  |  105  |  20  ----------------------------<  117<H>  5.0   |  26  |  0.94    Ca    8.4      15 Oct 2017 05:45  Ca    8.2<L>      14 Oct 2017 05:30  Phos  3.5     10-15  Phos  3.4     10-14  Mg     2.3     10-15  Mg     2.2     10-14      CAPILLARY BLOOD GLUCOSE                Cultures:     < from: Xray Chest 1 View AP -PORTABLE-Routine (10.15.17 @ 08:47) >  EXAM:  RAD CHEST PORTABLE ROUTINE        PROCEDURE DATE:  Oct 15 2017         INTERPRETATION:  Clinical indications: Follow-up chest x-ray.    Frontal view of the chest is obtained.    Comparison is made with October 14, 2017.    IMPRESSION:.    There is a left pigtail pleural catheter as on the prior study. There is   a small pneumothorax with interval decrease in size. Bilateral chronic   lung changes are noted as on the prior study. The patient is status post   left lung surgery with postoperative changes.                  MELANY GREEN M.D. ATTENDING RADIOLOGIST  This document has been electronically signed. Oct 15 2017 11:21AM    < end of copied text >                          Studies  Chest X-RAY  CT SCAN Chest   Venous Dopplers: LE:   CT Abdomen  Others

## 2017-10-15 NOTE — PROGRESS NOTE ADULT - PROBLEM SELECTOR PLAN 1
S/P surgery : reop twice secondary to hemothroax: currently on pressors and ventilator support: He is wheezing extensively: started on IV steroids 20 mg twice a day , but should be given only for few days : follow his wheezing daily as soon as it is resolved; dc steroids For now their is no hemoptysis: He had twice reop yesterday secondary to hemothorax and bleeding from chest tube: currently on pressors and seated and on broad spectrum antibiotics.  9/27: he seems to be doing better today: for extubation  9/28: extubated: alert and awake and responding: He is on zosyn any way with voriconazole!  9/29: chest x-ray today , to me looks better then yesterday: There is no official report yet:  10/02: doing ok  10/3: on eliquis: toelrating well: no hemoptysis  10/4 : stable  10/05: persistent left upper air space following DAVID lobectomy: defer to thoracic surgery  10/06: stable?: no HEMOPTYSIS  10/7: resolved  10/8: cont prednisone: pneumothorax has resolved  10/09: resolved  10/10: no more hemoptysis  1/11: resolved after surgery and despite eliquis: stable  11/12: stable: s./p surgery: has mod pneumothorax: on the elft side: would defer to ct surgery: back on suction!  10/13: doing   ok  10/14: decrease dose to 30 mg for a few days and then reduce it to 20 mg: increase ptx today on chest radiograph: dw Dr Caldera  10/15: resolved: ptx on the left side is better today on the chest radiogrpah

## 2017-10-16 LAB
HCT VFR BLD CALC: 32.7 % — LOW (ref 39–50)
HGB BLD-MCNC: 10.4 G/DL — LOW (ref 13–17)
MCHC RBC-ENTMCNC: 31 PG — SIGNIFICANT CHANGE UP (ref 27–34)
MCHC RBC-ENTMCNC: 31.8 % — LOW (ref 32–36)
MCV RBC AUTO: 97.6 FL — SIGNIFICANT CHANGE UP (ref 80–100)
NRBC # FLD: 0 — SIGNIFICANT CHANGE UP
PLATELET # BLD AUTO: 92 K/UL — LOW (ref 150–400)
PMV BLD: 14.3 FL — HIGH (ref 7–13)
RBC # BLD: 3.35 M/UL — LOW (ref 4.2–5.8)
RBC # FLD: 16.7 % — HIGH (ref 10.3–14.5)
WBC # BLD: 10.62 K/UL — HIGH (ref 3.8–10.5)
WBC # FLD AUTO: 10.62 K/UL — HIGH (ref 3.8–10.5)

## 2017-10-16 PROCEDURE — 71010: CPT | Mod: 26

## 2017-10-16 PROCEDURE — 71010: CPT | Mod: 26,77

## 2017-10-16 RX ADMIN — VORICONAZOLE 200 MILLIGRAM(S): 10 INJECTION, POWDER, LYOPHILIZED, FOR SOLUTION INTRAVENOUS at 05:39

## 2017-10-16 RX ADMIN — DIVALPROEX SODIUM 500 MILLIGRAM(S): 500 TABLET, DELAYED RELEASE ORAL at 17:52

## 2017-10-16 RX ADMIN — ALBUTEROL 2 PUFF(S): 90 AEROSOL, METERED ORAL at 17:53

## 2017-10-16 RX ADMIN — Medication 30 MILLIGRAM(S): at 05:39

## 2017-10-16 RX ADMIN — Medication 325 MILLIGRAM(S): at 05:39

## 2017-10-16 RX ADMIN — PANTOPRAZOLE SODIUM 40 MILLIGRAM(S): 20 TABLET, DELAYED RELEASE ORAL at 17:53

## 2017-10-16 RX ADMIN — Medication 2 PUFF(S): at 09:10

## 2017-10-16 RX ADMIN — OXYCODONE HYDROCHLORIDE 10 MILLIGRAM(S): 5 TABLET ORAL at 22:25

## 2017-10-16 RX ADMIN — ALBUTEROL 2 PUFF(S): 90 AEROSOL, METERED ORAL at 05:39

## 2017-10-16 RX ADMIN — OXYCODONE HYDROCHLORIDE 10 MILLIGRAM(S): 5 TABLET ORAL at 19:00

## 2017-10-16 RX ADMIN — Medication 1 MILLIGRAM(S): at 13:10

## 2017-10-16 RX ADMIN — VORICONAZOLE 200 MILLIGRAM(S): 10 INJECTION, POWDER, LYOPHILIZED, FOR SOLUTION INTRAVENOUS at 17:53

## 2017-10-16 RX ADMIN — ALBUTEROL 2 PUFF(S): 90 AEROSOL, METERED ORAL at 22:24

## 2017-10-16 RX ADMIN — Medication 2 PUFF(S): at 00:10

## 2017-10-16 RX ADMIN — Medication 100 MILLIGRAM(S): at 17:53

## 2017-10-16 RX ADMIN — DIVALPROEX SODIUM 500 MILLIGRAM(S): 500 TABLET, DELAYED RELEASE ORAL at 05:39

## 2017-10-16 RX ADMIN — PANTOPRAZOLE SODIUM 40 MILLIGRAM(S): 20 TABLET, DELAYED RELEASE ORAL at 05:39

## 2017-10-16 RX ADMIN — Medication 100 MILLIGRAM(S): at 05:39

## 2017-10-16 RX ADMIN — BUDESONIDE AND FORMOTEROL FUMARATE DIHYDRATE 2 PUFF(S): 160; 4.5 AEROSOL RESPIRATORY (INHALATION) at 22:23

## 2017-10-16 RX ADMIN — OXYCODONE HYDROCHLORIDE 10 MILLIGRAM(S): 5 TABLET ORAL at 23:25

## 2017-10-16 RX ADMIN — Medication 0.25 MILLIGRAM(S): at 13:10

## 2017-10-16 RX ADMIN — ATORVASTATIN CALCIUM 80 MILLIGRAM(S): 80 TABLET, FILM COATED ORAL at 22:25

## 2017-10-16 RX ADMIN — Medication 0.25 MILLIGRAM(S): at 05:38

## 2017-10-16 RX ADMIN — Medication 0.25 MILLIGRAM(S): at 22:25

## 2017-10-16 RX ADMIN — ALBUTEROL 2 PUFF(S): 90 AEROSOL, METERED ORAL at 13:10

## 2017-10-16 RX ADMIN — ALBUTEROL 2 PUFF(S): 90 AEROSOL, METERED ORAL at 09:10

## 2017-10-16 RX ADMIN — Medication 2 PUFF(S): at 17:53

## 2017-10-16 RX ADMIN — Medication 325 MILLIGRAM(S): at 13:10

## 2017-10-16 RX ADMIN — Medication 81 MILLIGRAM(S): at 13:10

## 2017-10-16 RX ADMIN — APIXABAN 5 MILLIGRAM(S): 2.5 TABLET, FILM COATED ORAL at 05:38

## 2017-10-16 RX ADMIN — ALBUTEROL 2 PUFF(S): 90 AEROSOL, METERED ORAL at 00:09

## 2017-10-16 RX ADMIN — BUDESONIDE AND FORMOTEROL FUMARATE DIHYDRATE 2 PUFF(S): 160; 4.5 AEROSOL RESPIRATORY (INHALATION) at 09:10

## 2017-10-16 RX ADMIN — Medication 325 MILLIGRAM(S): at 22:25

## 2017-10-16 RX ADMIN — APIXABAN 5 MILLIGRAM(S): 2.5 TABLET, FILM COATED ORAL at 17:53

## 2017-10-16 RX ADMIN — OXYCODONE HYDROCHLORIDE 10 MILLIGRAM(S): 5 TABLET ORAL at 18:01

## 2017-10-16 NOTE — PROGRESS NOTE ADULT - PROBLEM SELECTOR PLAN 6
on steroids at this time: it was given only for a short duration: thinking his wheezing is secondary to rhinovirus infection: It will cont till tomorrow: thereafter would taper it down to off in next 2-3  10/1: change prednisone to 10 mg daily  10/02: cont prednisone  10/03: trop prednisone after today's dose  10/4 stop prednisone tomorrow  10/5: dc prednisone today : pt was not on any treatment for sarcoidosis before  10/06: OFF PREDNISONE NOW  10/7: start prednisone 20 mg per day  10/09: started back on IV steroids given his increased wheezing!!  10/10: wheezing better: decrease steroids to 20 mg q 8  10//: decrease and change prednisone to 40 mg daily: probably will be discharged on some prednisone  10/13: decrease prednisone to 30 mg tomorrow  10/15: on 30 mg of prednisone  10/16: on tapering steroids

## 2017-10-16 NOTE — PROGRESS NOTE ADULT - PROBLEM SELECTOR PLAN 3
9/27; if he continues to show improvement: can decrease his solumedrol tomorrow  9/28: decreases his IV steroids to 20 mg once a day for a few days more  9/29: on 20 mg per day of solumedrol: would continue for few more days and then decrease it  9/30: doing ok: - wheezing: decrease his steroids to 10 mg a day from tomorrow  10/1: change to po prednisone 10 mg  10/02; cont po prednisone  10/3: stop prednisone after today,s dose?  10/4 : dc prednisone after tomorrow dose  10/5: DC prednisone today  10/6: OF PREDNISONE NOW  10/07: has sarcoidosis too: SOB with poor resp excursion: would start him on 20 mg of prednisone  10/8: cont prednisone with BD  10/09: his wheezing has increased today: Would change to IV METHYLPREDNISOLONE: 20 MG Q 8 HOURS  1/10was on 40 mg three times a day: dw icu attending , to decrease it to 20 q 8 hours  11/11: would change to po prednisone 40 mg daily  10/12: on steroids for now with slow taper  10/13: decrease prednisone to 30 mg tomorrow  10/14: prednisone 30 md every day : and then cut down to 20 mg  10/15: cont prednisone  10/16: on tapering steroids

## 2017-10-16 NOTE — PROGRESS NOTE ADULT - SUBJECTIVE AND OBJECTIVE BOX
Patient is a 47y old  Male who presents with a chief complaint of "My leg wouldn't stop shaking." (14 Sep 2017 09:59)  doing better today  'has ankle swelling     Any change in ROS:     MEDICATIONS  (STANDING):  ALBUTerol    90 MICROgram(s) HFA Inhaler 2 Puff(s) Inhalation every 4 hours  ALPRAZolam 0.25 milliGRAM(s) Oral three times a day  apixaban 5 milliGRAM(s) Oral every 12 hours  aspirin  chewable 81 milliGRAM(s) Oral daily  atorvastatin 80 milliGRAM(s) Oral at bedtime  buDESOnide 160 MICROgram(s)/formoterol 4.5 MICROgram(s) Inhaler 2 Puff(s) Inhalation every 12 hours  diVALproex  milliGRAM(s) Oral two times a day  docusate sodium 100 milliGRAM(s) Oral two times a day  ferrous    sulfate 325 milliGRAM(s) Oral every 8 hours  folic acid 1 milliGRAM(s) Oral daily  ipratropium 17 MICROgram(s) HFA Inhaler 2 Puff(s) Inhalation every 8 hours  pantoprazole    Tablet 40 milliGRAM(s) Oral two times a day before meals  polyethylene glycol 3350 17 Gram(s) Oral at bedtime  predniSONE   Tablet   Oral   predniSONE   Tablet 30 milliGRAM(s) Oral daily  voriconazole 200 milliGRAM(s) Oral every 12 hours    MEDICATIONS  (PRN):  acetaminophen    Suspension 650 milliGRAM(s) Oral every 6 hours PRN For Temp greater than 38 C (100.4 F)  acetaminophen   Tablet. 650 milliGRAM(s) Oral every 6 hours PRN Mild Pain (1 - 3)  aluminum hydroxide/magnesium hydroxide/simethicone Suspension 30 milliLiter(s) Oral every 6 hours PRN Dyspepsia  ketorolac   Injectable 15 milliGRAM(s) IV Push every 4 hours PRN breakthrough pain  oxyCODONE    IR 5 milliGRAM(s) Oral every 4 hours PRN Moderate Pain (4 - 6)  oxyCODONE    IR 10 milliGRAM(s) Oral every 4 hours PRN Severe Pain (7 - 10)    Vital Signs Last 24 Hrs  T(C): 36.8 (16 Oct 2017 11:56), Max: 37.2 (16 Oct 2017 08:12)  T(F): 98.3 (16 Oct 2017 11:56), Max: 98.9 (16 Oct 2017 08:12)  HR: 92 (16 Oct 2017 11:56) (71 - 92)  BP: 113/69 (16 Oct 2017 11:56) (112/70 - 134/82)  BP(mean): --  RR: 17 (16 Oct 2017 11:56) (17 - 18)  SpO2: 99% (16 Oct 2017 11:56) (96% - 100%)    I&O's Summary    15 Oct 2017 07:01  -  16 Oct 2017 07:00  --------------------------------------------------------  IN: 0 mL / OUT: 943 mL / NET: -943 mL    16 Oct 2017 07:01  -  16 Oct 2017 14:03  --------------------------------------------------------  IN: 0 mL / OUT: 210 mL / NET: -210 mL          Physical Exam:   GENERAL: NAD, well-groomed, well-developed  HEENT: GIOVANI/   Atraumatic, Normocephalic  ENMT: No tonsillar erythema, exudates, or enlargement; Moist mucous membranes, Good dentition, No lesions  NECK: Supple, No JVD, Normal thyroid  CHEST/LUNG: Clear to auscultaion, ; No rales, rhonchi, wheezing, or rubs  CVS: Regular rate and rhythm; No murmurs, rubs, or gallops  GI: : Soft, Nontender, Nondistended; Bowel sounds present  NERVOUS SYSTEM:  Alert & Oriented X3  EXTREMITIES:  2+ Peripheral Pulses, No clubbing, cyanosis, or edema  LYMPH: No lymphadenopathy noted  SKIN: No rashes or lesions  ENDOCRINOLOGY: No Thyromegaly  PSYCH: Appropriate    Labs:                              10.4   10.62 )-----------( 92       ( 16 Oct 2017 05:37 )             32.7                         9.8    8.77  )-----------( 94       ( 15 Oct 2017 05:45 )             31.7                         9.4    7.68  )-----------( 99       ( 14 Oct 2017 05:30 )             29.6                         9.6    9.82  )-----------( 118      ( 13 Oct 2017 06:54 )             31.3     10-15    142  |  102  |  19  ----------------------------<  106<H>  4.7   |  30  |  0.82  10-14    144  |  104  |  25<H>  ----------------------------<  88  3.9   |  32<H>  |  0.97  10-13    143  |  102  |  18  ----------------------------<  92  4.3   |  31  |  0.92    Ca    8.4      15 Oct 2017 05:45  Phos  3.5     10-15  Mg     2.3     10-15      CAPILLARY BLOOD GLUCOSE                Cultures:                 < from: Xray Chest 1 View AP -PORTABLE-Routine (10.16.17 @ 07:57) >    INTERPRETATION:  INDICATION: Status post left upper lobe lobectomy for   mycetoma.    TECHNIQUE: AP view of the chest.      COMPARISON: Chest radiograph October 15, 2017.      FINDINGS:      Lines and Tubes: Pigtail catheter overlies the left upper hemithorax.      Lungs: Status post left upper lobe resection. Bilateral emphysematous   changes consistent with COPD. Surgical clips and chain sutures overlie   the left upper hemithorax. Contrast: The lungs are clear.      Pleura: Small left apical pneumothorax.      Heart and Mediastinum: The cardiomediastinal silhouette cannot be   accurately assessed on the current projection.      Skeletal: Unremarkable.        IMPRESSION:    1.  Stable small left apical pneumothorax.    < end of copied text >              Studies  Chest X-RAY  CT SCAN Chest   Venous Dopplers: LE:   CT Abdomen  Others

## 2017-10-16 NOTE — PROGRESS NOTE ADULT - PROBLEM SELECTOR PLAN 1
S/P surgery : reop twice secondary to hemothroax: currently on pressors and ventilator support: He is wheezing extensively: started on IV steroids 20 mg twice a day , but should be given only for few days : follow his wheezing daily as soon as it is resolved; dc steroids For now their is no hemoptysis: He had twice reop yesterday secondary to hemothorax and bleeding from chest tube: currently on pressors and seated and on broad spectrum antibiotics.  9/27: he seems to be doing better today: for extubation  9/28: extubated: alert and awake and responding: He is on zosyn any way with voriconazole!  9/29: chest x-ray today , to me looks better then yesterday: There is no official report yet:  10/02: doing ok  10/3: on eliquis: toelrating well: no hemoptysis  10/4 : stable  10/05: persistent left upper air space following DAVID lobectomy: defer to thoracic surgery  10/06: stable?: no HEMOPTYSIS  10/7: resolved  10/8: cont prednisone: pneumothorax has resolved  10/09: resolved  10/10: no more hemoptysis  1/11: resolved after surgery and despite eliquis: stable  11/12: stable: s./p surgery: has mod pneumothorax: on the elft side: would defer to ct surgery: back on suction!  10/13: doing   ok  10/14: decrease dose to 30 mg for a few days and then reduce it to 20 mg: increase ptx today on chest radiograph: dw Dr Caldera  10/15: resolved: ptx on the left side is better today on the chest radiogrpah  10/16: small tiny left ptx: deferf to thoracici

## 2017-10-16 NOTE — PROGRESS NOTE ADULT - SUBJECTIVE AND OBJECTIVE BOX
Subjective: no acute complaints    Vital Signs:  Vital Signs Last 24 Hrs  T(C): 37.2 (10-16-17 @ 08:12), Max: 37.2 (10-16-17 @ 08:12)  T(F): 98.9 (10-16-17 @ 08:12), Max: 98.9 (10-16-17 @ 08:12)  HR: 87 (10-16-17 @ 08:12) (71 - 94)  BP: 134/82 (10-16-17 @ 08:12) (112/70 - 134/82)  RR: 18 (10-16-17 @ 08:12) (17 - 18)  SpO2: 100% (10-16-17 @ 08:12) (96% - 100%) on (O2)    Telemetry/Alarms:  General: WN/WD NAD  Neurology: Awake, nonfocal, BETH x 4  Eyes: Scleras clear, PERRLA/ EOMI, Gross vision intact  ENT:Gross hearing intact, grossly patent pharynx, no stridor  Neck: Neck supple, trachea midline, No JVD,   Respiratory: CTA B/L, No wheezing, rales, rhonchi  CV: RRR, S1S2, no murmurs, rubs or gallops  Abdominal: Soft, NT, ND +BS,   Extremities: No edema, + peripheral pulses  Skin: No Rashes, Hematoma, Ecchymosis  Lymphatic: No Neck, axilla, groin LAD  Psych: Oriented x 3, normal affect  Incisions: c,d,i  Tubes: intervential radiology inserted L PTC for PTX which remains - failed waterseal trials; remains on suction - tiny expiratory air leak  Relevant labs, radiology and Medications reviewed                        10.4   10.62 )-----------( 92       ( 16 Oct 2017 05:37 )             32.7     10-15    142  |  102  |  19  ----------------------------<  106<H>  4.7   |  30  |  0.82    Ca    8.4      15 Oct 2017 05:45  Phos  3.5     10-15  Mg     2.3     10-15        MEDICATIONS  (STANDING):  ALBUTerol    90 MICROgram(s) HFA Inhaler 2 Puff(s) Inhalation every 4 hours  ALPRAZolam 0.25 milliGRAM(s) Oral three times a day  apixaban 5 milliGRAM(s) Oral every 12 hours  aspirin  chewable 81 milliGRAM(s) Oral daily  atorvastatin 80 milliGRAM(s) Oral at bedtime  buDESOnide 160 MICROgram(s)/formoterol 4.5 MICROgram(s) Inhaler 2 Puff(s) Inhalation every 12 hours  diVALproex  milliGRAM(s) Oral two times a day  docusate sodium 100 milliGRAM(s) Oral two times a day  ferrous    sulfate 325 milliGRAM(s) Oral every 8 hours  folic acid 1 milliGRAM(s) Oral daily  ipratropium 17 MICROgram(s) HFA Inhaler 2 Puff(s) Inhalation every 8 hours  pantoprazole    Tablet 40 milliGRAM(s) Oral two times a day before meals  polyethylene glycol 3350 17 Gram(s) Oral at bedtime  predniSONE   Tablet   Oral   predniSONE   Tablet 30 milliGRAM(s) Oral daily  voriconazole 200 milliGRAM(s) Oral every 12 hours    MEDICATIONS  (PRN):  acetaminophen    Suspension 650 milliGRAM(s) Oral every 6 hours PRN For Temp greater than 38 C (100.4 F)  acetaminophen   Tablet. 650 milliGRAM(s) Oral every 6 hours PRN Mild Pain (1 - 3)  aluminum hydroxide/magnesium hydroxide/simethicone Suspension 30 milliLiter(s) Oral every 6 hours PRN Dyspepsia  ketorolac   Injectable 15 milliGRAM(s) IV Push every 4 hours PRN breakthrough pain  oxyCODONE    IR 5 milliGRAM(s) Oral every 4 hours PRN Moderate Pain (4 - 6)  oxyCODONE    IR 10 milliGRAM(s) Oral every 4 hours PRN Severe Pain (7 - 10)    Pertinent Physical Exam  I&O's Summary    15 Oct 2017 07:01  -  16 Oct 2017 07:00  --------------------------------------------------------  IN: 0 mL / OUT: 943 mL / NET: -943 mL    16 Oct 2017 07:01  -  16 Oct 2017 09:43  --------------------------------------------------------  IN: 0 mL / OUT: 210 mL / NET: -210 mL        Assessment  47y Male  w/ PAST MEDICAL & SURGICAL HISTORY:  History of pneumothorax: History of 3 prior pneumonthoracies; h/o hemoptysis episodes  COPD (chronic obstructive pulmonary disease)  Asthma  Hypertension  Sarcoidosis  No significant past surgical history  admitted with complaints of Patient is a 47y old  Male who presents with a chief complaint of "My leg wouldn't stop shaking." (14 Sep 2017 09:59)  .  On (9/24/17), patient underwent Thoracotomy DAVID lobectomy and RTOR 9/25/17 x 2 for hemothorax  . Postoperative course/issues: preop CVA w L hemiparesis, intraop/postop bleeding, aroxysmal afib, requiring pulm toilet. L PTX - had IR place L PTC - requiring suction - tiny expiratory air leak    PLAN  Neuro: Pain management  Pulm: Encourage coughing, deep breathing and use of incentive spirometry. Wean off supplemental oxygen as able. Daily CXR.   Cardio: Monitor telemetry/alarms; eliquis and asa for PAF  GI: Tolerating diet. Continue stool softeners.  Renal: monitor urine output, supplement electrolytes as needed  Vasc: Heparin SC/SCDs for DVT prophylaxis  Heme: Stable H/H. .   ID: Off antibiotics. Stable.  Therapy: OOB/ambulate  Tubes: Monitor Chest tube and air leak status; f/u PTX on CXR  Disposition: Aim to D/C to rehab later this week - Thursday or Friday; d/w case management and social work  Discussed with Cardiothoracic Team at AM rounds.

## 2017-10-17 LAB
BUN SERPL-MCNC: 14 MG/DL — SIGNIFICANT CHANGE UP (ref 7–23)
CALCIUM SERPL-MCNC: 8.5 MG/DL — SIGNIFICANT CHANGE UP (ref 8.4–10.5)
CHLORIDE SERPL-SCNC: 99 MMOL/L — SIGNIFICANT CHANGE UP (ref 98–107)
CO2 SERPL-SCNC: 29 MMOL/L — SIGNIFICANT CHANGE UP (ref 22–31)
CREAT SERPL-MCNC: 0.98 MG/DL — SIGNIFICANT CHANGE UP (ref 0.5–1.3)
GLUCOSE SERPL-MCNC: 96 MG/DL — SIGNIFICANT CHANGE UP (ref 70–99)
HCT VFR BLD CALC: 35.8 % — LOW (ref 39–50)
HGB BLD-MCNC: 11.2 G/DL — LOW (ref 13–17)
MCHC RBC-ENTMCNC: 31.3 % — LOW (ref 32–36)
MCHC RBC-ENTMCNC: 31.5 PG — SIGNIFICANT CHANGE UP (ref 27–34)
MCV RBC AUTO: 100.6 FL — HIGH (ref 80–100)
NRBC # FLD: 0 — SIGNIFICANT CHANGE UP
PLATELET # BLD AUTO: 91 K/UL — LOW (ref 150–400)
PMV BLD: 14.5 FL — HIGH (ref 7–13)
POTASSIUM SERPL-MCNC: 4.4 MMOL/L — SIGNIFICANT CHANGE UP (ref 3.5–5.3)
POTASSIUM SERPL-SCNC: 4.4 MMOL/L — SIGNIFICANT CHANGE UP (ref 3.5–5.3)
RBC # BLD: 3.56 M/UL — LOW (ref 4.2–5.8)
RBC # FLD: 16.6 % — HIGH (ref 10.3–14.5)
SODIUM SERPL-SCNC: 139 MMOL/L — SIGNIFICANT CHANGE UP (ref 135–145)
WBC # BLD: 12.58 K/UL — HIGH (ref 3.8–10.5)
WBC # FLD AUTO: 12.58 K/UL — HIGH (ref 3.8–10.5)

## 2017-10-17 PROCEDURE — 71010: CPT | Mod: 26

## 2017-10-17 RX ADMIN — Medication 650 MILLIGRAM(S): at 23:41

## 2017-10-17 RX ADMIN — VORICONAZOLE 200 MILLIGRAM(S): 10 INJECTION, POWDER, LYOPHILIZED, FOR SOLUTION INTRAVENOUS at 06:32

## 2017-10-17 RX ADMIN — DIVALPROEX SODIUM 500 MILLIGRAM(S): 500 TABLET, DELAYED RELEASE ORAL at 05:57

## 2017-10-17 RX ADMIN — Medication 325 MILLIGRAM(S): at 05:57

## 2017-10-17 RX ADMIN — BUDESONIDE AND FORMOTEROL FUMARATE DIHYDRATE 2 PUFF(S): 160; 4.5 AEROSOL RESPIRATORY (INHALATION) at 09:59

## 2017-10-17 RX ADMIN — Medication 81 MILLIGRAM(S): at 13:06

## 2017-10-17 RX ADMIN — ALBUTEROL 2 PUFF(S): 90 AEROSOL, METERED ORAL at 01:48

## 2017-10-17 RX ADMIN — ALBUTEROL 2 PUFF(S): 90 AEROSOL, METERED ORAL at 09:59

## 2017-10-17 RX ADMIN — Medication 2 PUFF(S): at 10:00

## 2017-10-17 RX ADMIN — PANTOPRAZOLE SODIUM 40 MILLIGRAM(S): 20 TABLET, DELAYED RELEASE ORAL at 17:08

## 2017-10-17 RX ADMIN — Medication 100 MILLIGRAM(S): at 05:57

## 2017-10-17 RX ADMIN — APIXABAN 5 MILLIGRAM(S): 2.5 TABLET, FILM COATED ORAL at 17:08

## 2017-10-17 RX ADMIN — Medication 325 MILLIGRAM(S): at 14:14

## 2017-10-17 RX ADMIN — Medication 2 PUFF(S): at 01:48

## 2017-10-17 RX ADMIN — Medication 325 MILLIGRAM(S): at 22:25

## 2017-10-17 RX ADMIN — APIXABAN 5 MILLIGRAM(S): 2.5 TABLET, FILM COATED ORAL at 05:57

## 2017-10-17 RX ADMIN — Medication 0.25 MILLIGRAM(S): at 22:24

## 2017-10-17 RX ADMIN — Medication 2 PUFF(S): at 17:41

## 2017-10-17 RX ADMIN — Medication 30 MILLIGRAM(S): at 05:57

## 2017-10-17 RX ADMIN — ATORVASTATIN CALCIUM 80 MILLIGRAM(S): 80 TABLET, FILM COATED ORAL at 22:25

## 2017-10-17 RX ADMIN — ALBUTEROL 2 PUFF(S): 90 AEROSOL, METERED ORAL at 17:08

## 2017-10-17 RX ADMIN — BUDESONIDE AND FORMOTEROL FUMARATE DIHYDRATE 2 PUFF(S): 160; 4.5 AEROSOL RESPIRATORY (INHALATION) at 22:24

## 2017-10-17 RX ADMIN — ALBUTEROL 2 PUFF(S): 90 AEROSOL, METERED ORAL at 05:57

## 2017-10-17 RX ADMIN — PANTOPRAZOLE SODIUM 40 MILLIGRAM(S): 20 TABLET, DELAYED RELEASE ORAL at 05:57

## 2017-10-17 RX ADMIN — DIVALPROEX SODIUM 500 MILLIGRAM(S): 500 TABLET, DELAYED RELEASE ORAL at 17:08

## 2017-10-17 RX ADMIN — ALBUTEROL 2 PUFF(S): 90 AEROSOL, METERED ORAL at 22:25

## 2017-10-17 RX ADMIN — Medication 0.25 MILLIGRAM(S): at 05:57

## 2017-10-17 RX ADMIN — Medication 1 MILLIGRAM(S): at 13:06

## 2017-10-17 RX ADMIN — VORICONAZOLE 200 MILLIGRAM(S): 10 INJECTION, POWDER, LYOPHILIZED, FOR SOLUTION INTRAVENOUS at 17:08

## 2017-10-17 RX ADMIN — ALBUTEROL 2 PUFF(S): 90 AEROSOL, METERED ORAL at 14:06

## 2017-10-17 NOTE — PROGRESS NOTE ADULT - PROBLEM SELECTOR PLAN 6
on steroids at this time: it was given only for a short duration: thinking his wheezing is secondary to rhinovirus infection: It will cont till tomorrow: thereafter would taper it down to off in next 2-3  10/1: change prednisone to 10 mg daily  10/02: cont prednisone  10/03: trop prednisone after today's dose  10/4 stop prednisone tomorrow  10/5: dc prednisone today : pt was not on any treatment for sarcoidosis before  10/06: OFF PREDNISONE NOW  10/7: start prednisone 20 mg per day  10/09: started back on IV steroids given his increased wheezing!!  10/10: wheezing better: decrease steroids to 20 mg q 8  10//: decrease and change prednisone to 40 mg daily: probably will be discharged on some prednisone  10/13: decrease prednisone to 30 mg tomorrow  10/15: on 30 mg of prednisone  10/16: on tapering steroids  10/17: stable: cont prednisone , will probably be dced on 20 mg of prednisone dustin

## 2017-10-17 NOTE — PROGRESS NOTE ADULT - PROBLEM SELECTOR PLAN 1
S/P surgery : reop twice secondary to hemothroax: currently on pressors and ventilator support: He is wheezing extensively: started on IV steroids 20 mg twice a day , but should be given only for few days : follow his wheezing daily as soon as it is resolved; dc steroids For now their is no hemoptysis: He had twice reop yesterday secondary to hemothorax and bleeding from chest tube: currently on pressors and seated and on broad spectrum antibiotics.  9/27: he seems to be doing better today: for extubation  9/28: extubated: alert and awake and responding: He is on zosyn any way with voriconazole!  9/29: chest x-ray today , to me looks better then yesterday: There is no official report yet:  10/02: doing ok  10/3: on eliquis: toelrating well: no hemoptysis  10/4 : stable  10/05: persistent left upper air space following DAVID lobectomy: defer to thoracic surgery  10/06: stable?: no HEMOPTYSIS  10/7: resolved  10/8: cont prednisone: pneumothorax has resolved  10/09: resolved  10/10: no more hemoptysis  1/11: resolved after surgery and despite eliquis: stable  11/12: stable: s./p surgery: has mod pneumothorax: on the elft side: would defer to ct surgery: back on suction!  10/13: doing   ok  10/14: decrease dose to 30 mg for a few days and then reduce it to 20 mg: increase ptx today on chest radiograph: dw Dr Caldera  10/15: resolved: ptx on the left side is better today on the chest radiogrpah  10/16: small tiny left ptx: defer to thoracici  10/17: resolved

## 2017-10-17 NOTE — PROGRESS NOTE ADULT - PROBLEM SELECTOR PLAN 3
9/27; if he continues to show improvement: can decrease his solumedrol tomorrow  9/28: decreases his IV steroids to 20 mg once a day for a few days more  9/29: on 20 mg per day of solumedrol: would continue for few more days and then decrease it  9/30: doing ok: - wheezing: decrease his steroids to 10 mg a day from tomorrow  10/1: change to po prednisone 10 mg  10/02; cont po prednisone  10/3: stop prednisone after today,s dose?  10/6: OF PREDNISONE NOW  10/07: has sarcoidosis too: SOB with poor resp excursion: would start him on 20 mg of prednisone  10/8: cont prednisone with BD  10/09: his wheezing has increased today: Would change to IV METHYLPREDNISOLONE: 20 MG Q 8 HOURS  1/10was on 40 mg three times a day: dw icu attending , to decrease it to 20 q 8 hours  11/11: would change to po prednisone 40 mg daily  10/12: on steroids for now with slow taper  10/13: decrease prednisone to 30 mg tomorrow  10/14: prednisone 30 md every day : and then cut down to 20 mg  10/15: cont prednisone  10/16: on tapering steroids  10/17: no wheezing pt/ot

## 2017-10-17 NOTE — PROGRESS NOTE ADULT - SUBJECTIVE AND OBJECTIVE BOX
Patient is a 47y old  Male who presents with a chief complaint of "My leg wouldn't stop shaking." (14 Sep 2017 09:59)    doing ok  no sob   no pain  leg swelling +  have been on eliquis  Any change in ROS:     MEDICATIONS  (STANDING):  ALBUTerol    90 MICROgram(s) HFA Inhaler 2 Puff(s) Inhalation every 4 hours  ALPRAZolam 0.25 milliGRAM(s) Oral three times a day  apixaban 5 milliGRAM(s) Oral every 12 hours  aspirin  chewable 81 milliGRAM(s) Oral daily  atorvastatin 80 milliGRAM(s) Oral at bedtime  buDESOnide 160 MICROgram(s)/formoterol 4.5 MICROgram(s) Inhaler 2 Puff(s) Inhalation every 12 hours  diVALproex  milliGRAM(s) Oral two times a day  docusate sodium 100 milliGRAM(s) Oral two times a day  ferrous    sulfate 325 milliGRAM(s) Oral every 8 hours  folic acid 1 milliGRAM(s) Oral daily  ipratropium 17 MICROgram(s) HFA Inhaler 2 Puff(s) Inhalation every 8 hours  pantoprazole    Tablet 40 milliGRAM(s) Oral two times a day before meals  polyethylene glycol 3350 17 Gram(s) Oral at bedtime  predniSONE   Tablet   Oral   predniSONE   Tablet 30 milliGRAM(s) Oral daily  voriconazole 200 milliGRAM(s) Oral every 12 hours    MEDICATIONS  (PRN):  acetaminophen    Suspension 650 milliGRAM(s) Oral every 6 hours PRN For Temp greater than 38 C (100.4 F)  acetaminophen   Tablet. 650 milliGRAM(s) Oral every 6 hours PRN Mild Pain (1 - 3)  aluminum hydroxide/magnesium hydroxide/simethicone Suspension 30 milliLiter(s) Oral every 6 hours PRN Dyspepsia  oxyCODONE    IR 5 milliGRAM(s) Oral every 4 hours PRN Moderate Pain (4 - 6)  oxyCODONE    IR 10 milliGRAM(s) Oral every 4 hours PRN Severe Pain (7 - 10)    Vital Signs Last 24 Hrs  T(C): 36.6 (17 Oct 2017 08:32), Max: 37.1 (16 Oct 2017 16:27)  T(F): 97.8 (17 Oct 2017 08:32), Max: 98.7 (16 Oct 2017 16:27)  HR: 95 (17 Oct 2017 08:32) (80 - 99)  BP: 118/73 (17 Oct 2017 08:32) (113/69 - 130/73)  BP(mean): 82 (17 Oct 2017 08:32) (82 - 82)  RR: 18 (17 Oct 2017 08:32) (17 - 18)  SpO2: 98% (17 Oct 2017 08:32) (95% - 100%)    I&O's Summary    16 Oct 2017 07:01  -  17 Oct 2017 07:00  --------------------------------------------------------  IN: 0 mL / OUT: 782 mL / NET: -782 mL    17 Oct 2017 07:01  -  17 Oct 2017 11:00  --------------------------------------------------------  IN: 120 mL / OUT: 200 mL / NET: -80 mL          Physical Exam:   GENERAL: NAD, well-groomed, well-developed  HEENT: GIOVANI/   Atraumatic, Normocephalic  ENMT: No tonsillar erythema, exudates, or enlargement; Moist mucous membranes, Good dentition, No lesions  NECK: Supple, No JVD, Normal thyroid  CHEST/LUNG: Cpoor air entry : some coarse crackles   CVS: Regular rate and rhythm; No murmurs, rubs, or gallops  GI: : Soft, Nontender, Nondistended; Bowel sounds present  NERVOUS SYSTEM:  Alert & Oriented X3  EXTREMITIES:  ankle  edema  LYMPH: No lymphadenopathy noted  SKIN: No rashes or lesions  ENDOCRINOLOGY: No Thyromegaly  PSYCH: Appropriate    Labs:                              11.2   12.58 )-----------( 91       ( 17 Oct 2017 07:00 )             35.8                         10.4   10.62 )-----------( 92       ( 16 Oct 2017 05:37 )             32.7                         9.8    8.77  )-----------( 94       ( 15 Oct 2017 05:45 )             31.7                         9.4    7.68  )-----------( 99       ( 14 Oct 2017 05:30 )             29.6     10-17    139  |  99  |  14  ----------------------------<  96  4.4   |  29  |  0.98  10-15    142  |  102  |  19  ----------------------------<  106<H>  4.7   |  30  |  0.82  10-14    144  |  104  |  25<H>  ----------------------------<  88  3.9   |  32<H>  |  0.97    Ca    8.5      17 Oct 2017 07:00      CAPILLARY BLOOD GLUCOSE                Cultures:               < from: Xray Chest 1 View AP -PORTABLE-Routine (10.17.17 @ 07:47) >  arcoidosis. Follow-up.    TECHNIQUE: 2 frontal views of the chest.    COMPARISON: Chest x-ray 10/16/2017 6:48 AM    FINDINGS:   10/16/2017 at 4:49 PM: Left chest tube is noted. There is a moderate left   pneumothorax, significantly increased in size from prior examination.   There are unchanged bilateral lung opacities. Trace right pleural   effusion. The heart is not enlarged. There is left chest subcutaneous   emphysema.    Findings were discussed with YUDITH Chan on 10/16/2017 at 5:45 PM by Dr. Torrez.    10/17/2017 at 6:40 AM: There is a left-sided chest tube. There is a small   left pneumothorax, decreased in size from prior examination. Bilateral   lung opacities are unchanged. Trace right pleural effusion is unchanged.  New trace left pleural effusion.. Heart size is normal. There is left   chest subcutaneous emphysema.    IMPRESSION: On the latest examination, there is a small left pneumothorax   which is decreased in size.    New small left pleural effusion.                    ALYSSA CHIRINOS M.D., RADIOLOGY RESIDENT  This documenthas been electronically signed.  KEERTHI FIELDS M.D., ATTENDING RADIOLOGIST  This document has been electronically signed. Oct 17 2017 10:49AM        < end of copied text >                Studies  Chest X-RAY  CT SCAN Chest   Venous Dopplers: LE:   CT Abdomen  Others

## 2017-10-17 NOTE — PROGRESS NOTE ADULT - SUBJECTIVE AND OBJECTIVE BOX
Subjective:    Vital Signs: Hemodynamically stable and afebrile  Vital Signs Last 24 Hrs  T(C): 36.6 (10-17-17 @ 08:32), Max: 37.1 (10-16-17 @ 16:27)  T(F): 97.8 (10-17-17 @ 08:32), Max: 98.7 (10-16-17 @ 16:27)  HR: 95 (10-17-17 @ 08:32) (80 - 99)  BP: 118/73 (10-17-17 @ 08:32) (118/73 - 130/73)  RR: 18 (10-17-17 @ 08:32) (17 - 18)  SpO2: 98% (10-17-17 @ 08:32) (95% - 100%) on (O2)    Telemetry/Alarms: NSR  General: WN/WD NAD  Neurology: Awake, nonfocal, BETH x 4  Eyes: Scleras clear, PERRLA/ EOMI, Gross vision intact  ENT:Gross hearing intact, grossly patent pharynx, no stridor  Neck: Neck supple, trachea midline, No JVD,   Respiratory: Decreased Lt > Rt  B/L, No wheezing, rales, a few scattered rhonchi  CV: RRR, S1S2, no murmurs, rubs or gallops  Abdominal: Soft, NT, ND +BS,   Extremities: No edema, + peripheral pulses, no calf pain or tenderness  Skin: No Rashes, Hematoma, Ecchymosis  Lymphatic: No Neck, axilla, groin LAD  Psych: Oriented x 3, normal affect  Incisions: No s-s of infection  Tubes: Lt PTC->suction, + air leak   Relevant labs, radiology->On the latest examination, there is a small left pneumothorax which is decreased in size. New small left pleural effusion. and Medications reviewed                          11.2   12.58 )-----------( 91       ( 17 Oct 2017 07:00 )             35.8     10-17    139  |  99  |  14  ----------------------------<  96  4.4   |  29  |  0.98    Ca    8.5      17 Oct 2017 07:00        MEDICATIONS  (STANDING):  ALBUTerol    90 MICROgram(s) HFA Inhaler 2 Puff(s) Inhalation every 4 hours  ALPRAZolam 0.25 milliGRAM(s) Oral three times a day  apixaban 5 milliGRAM(s) Oral every 12 hours  aspirin  chewable 81 milliGRAM(s) Oral daily  atorvastatin 80 milliGRAM(s) Oral at bedtime  buDESOnide 160 MICROgram(s)/formoterol 4.5 MICROgram(s) Inhaler 2 Puff(s) Inhalation every 12 hours  diVALproex  milliGRAM(s) Oral two times a day  docusate sodium 100 milliGRAM(s) Oral two times a day  ferrous    sulfate 325 milliGRAM(s) Oral every 8 hours  folic acid 1 milliGRAM(s) Oral daily  ipratropium 17 MICROgram(s) HFA Inhaler 2 Puff(s) Inhalation every 8 hours  pantoprazole    Tablet 40 milliGRAM(s) Oral two times a day before meals  polyethylene glycol 3350 17 Gram(s) Oral at bedtime  predniSONE   Tablet   Oral   predniSONE   Tablet 30 milliGRAM(s) Oral daily  voriconazole 200 milliGRAM(s) Oral every 12 hours    MEDICATIONS  (PRN):  acetaminophen    Suspension 650 milliGRAM(s) Oral every 6 hours PRN For Temp greater than 38 C (100.4 F)  acetaminophen   Tablet. 650 milliGRAM(s) Oral every 6 hours PRN Mild Pain (1 - 3)  aluminum hydroxide/magnesium hydroxide/simethicone Suspension 30 milliLiter(s) Oral every 6 hours PRN Dyspepsia  oxyCODONE    IR 5 milliGRAM(s) Oral every 4 hours PRN Moderate Pain (4 - 6)  oxyCODONE    IR 10 milliGRAM(s) Oral every 4 hours PRN Severe Pain (7 - 10)    Pertinent Physical Exam  I&O's Summary    16 Oct 2017 07:01  -  17 Oct 2017 07:00  --------------------------------------------------------  IN: 0 mL / OUT: 782 mL / NET: -782 mL    17 Oct 2017 07:01  -  17 Oct 2017 12:28  --------------------------------------------------------  IN: 120 mL / OUT: 205 mL / NET: -85 mL        Assessment  47y Male  w/ PAST MEDICAL & SURGICAL HISTORY:  History of pneumothorax: History of 3 prior pneumonthoracies.  COPD (chronic obstructive pulmonary disease)  Asthma  Hypertension  Sarcoidosis  No significant past surgical history  admitted with complaints of Patient is a 47y old  Male who presents with a chief complaint of "My leg wouldn't stop shaking." (14 Sep 2017 09:59)  .  On (Date), patient underwent Thoracotomy Central venous catheter placement with ultrasound guidance Thoracoscopic pneumolysis Thoracotomy, with lung lobectomy Bronchoscopy with bronchoalveolar lavage of both sides  Postoperative course/issues:    PLAN  Neuro: Pain management  Pulm: Encourage coughing, deep breathing and use of incentive spirometry. Wean off supplemental oxygen as able. Daily CXR.   Cardio: Monitor telemetry/alarms  GI: Tolerating diet. Continue stool softeners.  Renal: monitor urine output, supplement electrolytes as needed  Vasc: Heparin SC/SCDs for DVT prophylaxis  Heme: Stable H/H. .   ID: Off antibiotics. Stable.  Therapy: OOB/ambulate  Tubes: Monitor Chest tube output  Disposition: Aim to D/C to home on  Discussed with Cardiothoracic Team at AM rounds. Subjective:    Vital Signs: Hemodynamically stable and afebrile  Vital Signs Last 24 Hrs  T(C): 36.6 (10-17-17 @ 08:32), Max: 37.1 (10-16-17 @ 16:27)  T(F): 97.8 (10-17-17 @ 08:32), Max: 98.7 (10-16-17 @ 16:27)  HR: 95 (10-17-17 @ 08:32) (80 - 99)  BP: 118/73 (10-17-17 @ 08:32) (118/73 - 130/73)  RR: 18 (10-17-17 @ 08:32) (17 - 18)  SpO2: 98% (10-17-17 @ 08:32) (95% - 100%) on (O2)    Telemetry/Alarms: NSR  General: WN/WD NAD  Neurology: Awake, nonfocal, BETH x 4  Eyes: Scleras clear, PERRLA/ EOMI, Gross vision intact  ENT:Gross hearing intact, grossly patent pharynx, no stridor  Neck: Neck supple, trachea midline, No JVD,   Respiratory: Decreased Lt > Rt  B/L, No wheezing, rales, a few scattered rhonchi  CV: RRR, S1S2, no murmurs, rubs or gallops  Abdominal: Soft, NT, ND +BS,   Extremities: No edema, + peripheral pulses, no calf pain or tenderness  Skin: No Rashes, Hematoma, Ecchymosis  Lymphatic: No Neck, axilla, groin LAD  Psych: Oriented x 3, normal affect  Incisions: No s-s of infection  Tubes: Lt PTC->suction, + air leak   Relevant labs, radiology->On the latest examination, there is a small left pneumothorax which is decreased in size. New small left pleural effusion. and Medications reviewed                          11.2   12.58 )-----------( 91       ( 17 Oct 2017 07:00 )             35.8     10-17    139  |  99  |  14  ----------------------------<  96  4.4   |  29  |  0.98    Ca    8.5      17 Oct 2017 07:00        MEDICATIONS  (STANDING):  ALBUTerol    90 MICROgram(s) HFA Inhaler 2 Puff(s) Inhalation every 4 hours  ALPRAZolam 0.25 milliGRAM(s) Oral three times a day  apixaban 5 milliGRAM(s) Oral every 12 hours  aspirin  chewable 81 milliGRAM(s) Oral daily  atorvastatin 80 milliGRAM(s) Oral at bedtime  buDESOnide 160 MICROgram(s)/formoterol 4.5 MICROgram(s) Inhaler 2 Puff(s) Inhalation every 12 hours  diVALproex  milliGRAM(s) Oral two times a day  docusate sodium 100 milliGRAM(s) Oral two times a day  ferrous    sulfate 325 milliGRAM(s) Oral every 8 hours  folic acid 1 milliGRAM(s) Oral daily  ipratropium 17 MICROgram(s) HFA Inhaler 2 Puff(s) Inhalation every 8 hours  pantoprazole    Tablet 40 milliGRAM(s) Oral two times a day before meals  polyethylene glycol 3350 17 Gram(s) Oral at bedtime  predniSONE   Tablet   Oral   predniSONE   Tablet 30 milliGRAM(s) Oral daily  voriconazole 200 milliGRAM(s) Oral every 12 hours    MEDICATIONS  (PRN):  acetaminophen    Suspension 650 milliGRAM(s) Oral every 6 hours PRN For Temp greater than 38 C (100.4 F)  acetaminophen   Tablet. 650 milliGRAM(s) Oral every 6 hours PRN Mild Pain (1 - 3)  aluminum hydroxide/magnesium hydroxide/simethicone Suspension 30 milliLiter(s) Oral every 6 hours PRN Dyspepsia  oxyCODONE    IR 5 milliGRAM(s) Oral every 4 hours PRN Moderate Pain (4 - 6)  oxyCODONE    IR 10 milliGRAM(s) Oral every 4 hours PRN Severe Pain (7 - 10)    Pertinent Physical Exam  I&O's Summary    16 Oct 2017 07:01  -  17 Oct 2017 07:00  --------------------------------------------------------  IN: 0 mL / OUT: 782 mL / NET: -782 mL    17 Oct 2017 07:01  -  17 Oct 2017 12:28  --------------------------------------------------------  IN: 120 mL / OUT: 205 mL / NET: -85 mL        Assessment  47y Male  w/ PAST MEDICAL & SURGICAL HISTORY:  History of pneumothorax: History of 3 prior pneumonthoracies.  COPD (chronic obstructive pulmonary disease)  Asthma  Hypertension  Sarcoidosis  No significant past surgical history  admitted with complaints of Patient is a 47y old  Male who presents with a chief complaint of "My leg wouldn't stop shaking." (14 Sep 2017 09:59)  .  On (Date), patient underwent Thoracotomy Central venous catheter placement with ultrasound guidance Thoracoscopic pneumolysis Thoracotomy, with lung lobectomy Bronchoscopy with bronchoalveolar lavage of both sides  Postoperative course/issues:PAF- AC on hold due to hemoptysis; 9/21-Intraop: Bleeding from DAVID (non-lingular)-Recom IR to embolize; 9/22 CT angio done per IR Lobko request.  Formal PFTS and ask Card to fix PFO now to decrease blood flow; Pt on thoracic service now -  to CTICU & OR on 9/24; 9/24: 1400ml EBL in OR; 10 u PRBCs; CTI- DAVID space despite -40 Sxn, MEYER, L Hemiparesis (CVA) better w walking; F/u ID- Voriconazole to 10/24; Plan acute rehab 10/6 IR L PTC for PTX; 10/13 WS today if CXR ok -- incr PTX --> back to suction at all times 10/14: sutured leaking old chest tube site. Keep ct to suction. 10/0 Uneventful PTC remains on suction with air leak      PLAN  Neuro: Pain management  Pulm: Encourage coughing, deep breathing and use of incentive spirometry. Wean off supplemental oxygen as able. Daily CXR.   Cardio: Monitor telemetry/alarms  GI: Tolerating diet. Continue stool softeners.  Renal: monitor urine output, supplement electrolytes as needed  Vasc: Heparin SC/SCDs for DVT prophylaxis  Heme: Stable H/H. .   ID: Off antibiotics. Stable.  Therapy: OOB/ambulate  Tubes: Monitor Chest tube output  Disposition: Aim to D/C to home on  Discussed with Cardiothoracic Team at AM rounds. Subjective:    Vital Signs: Hemodynamically stable and afebrile  Vital Signs Last 24 Hrs  T(C): 36.6 (10-17-17 @ 08:32), Max: 37.1 (10-16-17 @ 16:27)  T(F): 97.8 (10-17-17 @ 08:32), Max: 98.7 (10-16-17 @ 16:27)  HR: 95 (10-17-17 @ 08:32) (80 - 99)  BP: 118/73 (10-17-17 @ 08:32) (118/73 - 130/73)  RR: 18 (10-17-17 @ 08:32) (17 - 18)  SpO2: 98% (10-17-17 @ 08:32) (95% - 100%) on (O2)    Telemetry/Alarms: NSR  General: WN/WD NAD  Neurology: Awake, nonfocal, BETH x 4  Eyes: Scleras clear, PERRLA/ EOMI, Gross vision intact  ENT:Gross hearing intact, grossly patent pharynx, no stridor  Neck: Neck supple, trachea midline, No JVD,   Respiratory: Decreased Lt > Rt  B/L, No wheezing, rales, a few scattered rhonchi  CV: RRR, S1S2, no murmurs, rubs or gallops  Abdominal: Soft, NT, ND +BS,   Extremities: No edema, + peripheral pulses, no calf pain or tenderness  Skin: No Rashes, Hematoma, Ecchymosis  Lymphatic: No Neck, axilla, groin LAD  Psych: Oriented x 3, normal affect  Incisions: No s-s of infection  Tubes: Lt PTC->suction, + air leak   Relevant labs, radiology->On the latest examination, there is a small left pneumothorax which is decreased in size. New small left pleural effusion. and Medications reviewed                          11.2   12.58 )-----------( 91       ( 17 Oct 2017 07:00 )             35.8     10-17    139  |  99  |  14  ----------------------------<  96  4.4   |  29  |  0.98    Ca    8.5      17 Oct 2017 07:00        MEDICATIONS  (STANDING):  ALBUTerol    90 MICROgram(s) HFA Inhaler 2 Puff(s) Inhalation every 4 hours  ALPRAZolam 0.25 milliGRAM(s) Oral three times a day  apixaban 5 milliGRAM(s) Oral every 12 hours  aspirin  chewable 81 milliGRAM(s) Oral daily  atorvastatin 80 milliGRAM(s) Oral at bedtime  buDESOnide 160 MICROgram(s)/formoterol 4.5 MICROgram(s) Inhaler 2 Puff(s) Inhalation every 12 hours  diVALproex  milliGRAM(s) Oral two times a day  docusate sodium 100 milliGRAM(s) Oral two times a day  ferrous    sulfate 325 milliGRAM(s) Oral every 8 hours  folic acid 1 milliGRAM(s) Oral daily  ipratropium 17 MICROgram(s) HFA Inhaler 2 Puff(s) Inhalation every 8 hours  pantoprazole    Tablet 40 milliGRAM(s) Oral two times a day before meals  polyethylene glycol 3350 17 Gram(s) Oral at bedtime  predniSONE   Tablet   Oral   predniSONE   Tablet 30 milliGRAM(s) Oral daily  voriconazole 200 milliGRAM(s) Oral every 12 hours    MEDICATIONS  (PRN):  acetaminophen    Suspension 650 milliGRAM(s) Oral every 6 hours PRN For Temp greater than 38 C (100.4 F)  acetaminophen   Tablet. 650 milliGRAM(s) Oral every 6 hours PRN Mild Pain (1 - 3)  aluminum hydroxide/magnesium hydroxide/simethicone Suspension 30 milliLiter(s) Oral every 6 hours PRN Dyspepsia  oxyCODONE    IR 5 milliGRAM(s) Oral every 4 hours PRN Moderate Pain (4 - 6)  oxyCODONE    IR 10 milliGRAM(s) Oral every 4 hours PRN Severe Pain (7 - 10)    Pertinent Physical Exam  I&O's Summary    16 Oct 2017 07:01  -  17 Oct 2017 07:00  --------------------------------------------------------  IN: 0 mL / OUT: 782 mL / NET: -782 mL    17 Oct 2017 07:01  -  17 Oct 2017 12:28  --------------------------------------------------------  IN: 120 mL / OUT: 205 mL / NET: -85 mL        Assessment  47y Male  w/ PAST MEDICAL & SURGICAL HISTORY:  History of pneumothorax: History of 3 prior pneumonthoracies.  COPD (chronic obstructive pulmonary disease)  Asthma  Hypertension  Sarcoidosis  No significant past surgical history  admitted with complaints of Patient is a 47y old  Male who presents with a chief complaint of "My leg wouldn't stop shaking." (14 Sep 2017 09:59)  .  On (Date), patient underwent Thoracotomy Central venous catheter placement with ultrasound guidance Thoracoscopic pneumolysis Thoracotomy, with lung lobectomy Bronchoscopy with bronchoalveolar lavage of both sides  Postoperative course/issues:PAF- AC on hold due to hemoptysis; 9/21-Intraop: Bleeding from DAVID (non-lingular)-Recom IR to embolize; 9/22 CT angio done per IR Lobko request.  Formal PFTS and ask Card to fix PFO now to decrease blood flow; Pt on thoracic service now -  to CTICU & OR on 9/24; 9/24: 1400ml EBL in OR; 10 u PRBCs; CTI- DAVID space despite -40 Sxn, MEYER, L Hemiparesis (CVA) better w walking; F/u ID- Voriconazole to 10/24; Plan acute rehab 10/6 IR L PTC for PTX; 10/13 WS today if CXR ok -- incr PTX --> back to suction at all times 10/14: sutured leaking old chest tube site. Keep ct to suction. 10/0 Uneventful PTC remains on suction with air leak, 10/16 Failed WS trail, returned to suction. 10/17 Remains on suction.        PLAN    Neuro: Pain management  Pulm: Encourage coughing, deep breathing and use of incentive spirometry. Wean off supplemental oxygen as able. Daily CXR.   Cardio: Monitor telemetry/alarms  GI: Tolerating diet. Continue stool softeners.  Renal: monitor urine output, supplement electrolytes as needed  Vasc: Heparin SC/ SCDs for DVT prophylaxis  Heme: Stable H/H. .   ID: Off antibiotics. Stable.  Therapy: OOB/ambulate  Tubes: Monitor Chest tube output and airleak, Will attempt WS again if PTX incrs. will consider pleurodesis   Disposition: Aim to D/C to rehab next week.  Discussed with Cardiothoracic Team at AM rounds. Subjective:    Vital Signs: Hemodynamically stable and afebrile  Vital Signs Last 24 Hrs  T(C): 36.6 (10-17-17 @ 08:32), Max: 37.1 (10-16-17 @ 16:27)  T(F): 97.8 (10-17-17 @ 08:32), Max: 98.7 (10-16-17 @ 16:27)  HR: 95 (10-17-17 @ 08:32) (80 - 99)  BP: 118/73 (10-17-17 @ 08:32) (118/73 - 130/73)  RR: 18 (10-17-17 @ 08:32) (17 - 18)  SpO2: 98% (10-17-17 @ 08:32) (95% - 100%) on (O2)    Telemetry/Alarms: NSR  General: WN/WD NAD  Neurology: Awake, nonfocal, BETH x 4  Eyes: Scleras clear, PERRLA/ EOMI, Gross vision intact  ENT:Gross hearing intact, grossly patent pharynx, no stridor  Neck: Neck supple, trachea midline, No JVD,   Respiratory: Decreased Lt > Rt  B/L, No wheezing, rales, a few scattered rhonchi  CV: RRR, S1S2, no murmurs, rubs or gallops  Abdominal: Soft, NT, ND +BS,   Extremities: No edema, + peripheral pulses, no calf pain or tenderness  Skin: No Rashes, Hematoma, Ecchymosis  Lymphatic: No Neck, axilla, groin LAD  Psych: Oriented x 3, normal affect  Incisions: No s-s of infection  Tubes: Lt PTC->suction, + air leak   Relevant labs, radiology->On the latest examination, there is a small left pneumothorax which is decreased in size. New small left pleural effusion. and Medications reviewed                          11.2   12.58 )-----------( 91       ( 17 Oct 2017 07:00 )             35.8     10-17    139  |  99  |  14  ----------------------------<  96  4.4   |  29  |  0.98    Ca    8.5      17 Oct 2017 07:00        MEDICATIONS  (STANDING):  ALBUTerol    90 MICROgram(s) HFA Inhaler 2 Puff(s) Inhalation every 4 hours  ALPRAZolam 0.25 milliGRAM(s) Oral three times a day  apixaban 5 milliGRAM(s) Oral every 12 hours  aspirin  chewable 81 milliGRAM(s) Oral daily  atorvastatin 80 milliGRAM(s) Oral at bedtime  buDESOnide 160 MICROgram(s)/formoterol 4.5 MICROgram(s) Inhaler 2 Puff(s) Inhalation every 12 hours  diVALproex  milliGRAM(s) Oral two times a day  docusate sodium 100 milliGRAM(s) Oral two times a day  ferrous    sulfate 325 milliGRAM(s) Oral every 8 hours  folic acid 1 milliGRAM(s) Oral daily  ipratropium 17 MICROgram(s) HFA Inhaler 2 Puff(s) Inhalation every 8 hours  pantoprazole    Tablet 40 milliGRAM(s) Oral two times a day before meals  polyethylene glycol 3350 17 Gram(s) Oral at bedtime  predniSONE   Tablet   Oral   predniSONE   Tablet 30 milliGRAM(s) Oral daily  voriconazole 200 milliGRAM(s) Oral every 12 hours    MEDICATIONS  (PRN):  acetaminophen    Suspension 650 milliGRAM(s) Oral every 6 hours PRN For Temp greater than 38 C (100.4 F)  acetaminophen   Tablet. 650 milliGRAM(s) Oral every 6 hours PRN Mild Pain (1 - 3)  aluminum hydroxide/magnesium hydroxide/simethicone Suspension 30 milliLiter(s) Oral every 6 hours PRN Dyspepsia  oxyCODONE    IR 5 milliGRAM(s) Oral every 4 hours PRN Moderate Pain (4 - 6)  oxyCODONE    IR 10 milliGRAM(s) Oral every 4 hours PRN Severe Pain (7 - 10)    Pertinent Physical Exam  I&O's Summary    16 Oct 2017 07:01  -  17 Oct 2017 07:00  --------------------------------------------------------  IN: 0 mL / OUT: 782 mL / NET: -782 mL    17 Oct 2017 07:01  -  17 Oct 2017 12:28  --------------------------------------------------------  IN: 120 mL / OUT: 205 mL / NET: -85 mL        Assessment  47y Male  w/ PAST MEDICAL & SURGICAL HISTORY:  History of pneumothorax: History of 3 prior pneumonthoracies.  COPD (chronic obstructive pulmonary disease)  Asthma  Hypertension  Sarcoidosis  No significant past surgical history  admitted with complaints of Patient is a 47y old  Male who presents with a chief complaint of "My leg wouldn't stop shaking." (14 Sep 2017 09:59)  .  On 9/21- 24-25/17, patient underwent Thoracotomy Central venous catheter placement with ultrasound guidance Thoracoscopic pneumolysis Thoracotomy, with lung lobectomy Bronchoscopy with bronchoalveolar lavage of both sides  Postoperative course/issues:PAF- AC on hold due to hemoptysis; 9/21-Intraop: Bleeding from DAVID (non-lingular)-Recom IR to embolize; 9/22 CT angio done per IR Lobko request.  Formal PFTS and ask Card to fix PFO now to decrease blood flow; Pt on thoracic service now   to CTICU & OR on 9/24; 9/24: 1400ml EBL in OR; 10 u PRBCs; CTI- DAVID space despite -40 Sxn, MEYER, L Hemiparesis (CVA) better w walking; F/u ID- Voriconazole to 10/24; Plan acute rehab 10/6 IR L PTC for PTX; 10/13 WS today if CXR ok -- incr PTX --> back to suction at all times 10/14: sutured leaking old chest tube site. Keep ct to suction. 10/0 Uneventful PTC remains on suction with air leak, 10/16 Failed WS trail, returned to suction. 10/17 Remains on suction.        PLAN    Neuro: Pain management  Pulm: Encourage coughing, deep breathing and use of incentive spirometry. Wean off supplemental oxygen as able. Daily CXR.   Cardio: Monitor telemetry/alarms  GI: Tolerating diet. Continue stool softeners.  Renal: monitor urine output, supplement electrolytes as needed  Vasc: Heparin SC/ SCDs for DVT prophylaxis  Heme: Stable H/H. .   ID: Off antibiotics. Stable.  Therapy: OOB/ambulate  Tubes: Monitor Chest tube output and airleak, Will attempt WS again if PTX incrs. will consider pleurodesis   Disposition: Aim to D/C to rehab next week.  Discussed with Cardiothoracic Team at AM rounds.

## 2017-10-17 NOTE — CHART NOTE - NSCHARTNOTEFT_GEN_A_CORE
Source: Patient [ x]    Family [ ]     other [ ]    Diet : Regular    F/U note  Pt seen for f/u appetite po, good with no N/V/D or weight loss reported at this time. Recommend to add low sodium diet as recommended by RD in initial assessment. Healthy diet encouraged. RD remains available.       Patient reports [ ] nausea  [ ] vomiting [ ] diarrhea [ ] constipation  [ ]chewing problems [ ] swallowing issues  [ ] other:  denies any N/V/D     PO intake:  < 50% [ ] 50-75% [ x]   % [ ]  other :     Source for PO intake [ x] Patient [ ] family [ ] chart [ ] staff [ ] other      Current Weight: 97 kg  % Weight Change    Pertinent Medications: MEDICATIONS  (STANDING):  ALBUTerol    90 MICROgram(s) HFA Inhaler 2 Puff(s) Inhalation every 4 hours  ALPRAZolam 0.25 milliGRAM(s) Oral three times a day  apixaban 5 milliGRAM(s) Oral every 12 hours  aspirin  chewable 81 milliGRAM(s) Oral daily  atorvastatin 80 milliGRAM(s) Oral at bedtime  buDESOnide 160 MICROgram(s)/formoterol 4.5 MICROgram(s) Inhaler 2 Puff(s) Inhalation every 12 hours  diVALproex  milliGRAM(s) Oral two times a day  docusate sodium 100 milliGRAM(s) Oral two times a day  ferrous    sulfate 325 milliGRAM(s) Oral every 8 hours  folic acid 1 milliGRAM(s) Oral daily  ipratropium 17 MICROgram(s) HFA Inhaler 2 Puff(s) Inhalation every 8 hours  pantoprazole    Tablet 40 milliGRAM(s) Oral two times a day before meals  polyethylene glycol 3350 17 Gram(s) Oral at bedtime  predniSONE   Tablet   Oral   predniSONE   Tablet 30 milliGRAM(s) Oral daily  voriconazole 200 milliGRAM(s) Oral every 12 hours    MEDICATIONS  (PRN):  acetaminophen    Suspension 650 milliGRAM(s) Oral every 6 hours PRN For Temp greater than 38 C (100.4 F)  acetaminophen   Tablet. 650 milliGRAM(s) Oral every 6 hours PRN Mild Pain (1 - 3)  aluminum hydroxide/magnesium hydroxide/simethicone Suspension 30 milliLiter(s) Oral every 6 hours PRN Dyspepsia  oxyCODONE    IR 5 milliGRAM(s) Oral every 4 hours PRN Moderate Pain (4 - 6)  oxyCODONE    IR 10 milliGRAM(s) Oral every 4 hours PRN Severe Pain (7 - 10)    Pertinent Labs:  10-17 Na139 mmol/L Glu 96 mg/dL K+ 4.4 mmol/L Cr  0.98 mg/dL BUN 14 mg/dL Phos n/a   Alb n/a   PAB n/a         Skin: maintain skin integrity    Estimated Needs:   [ x] no change since previous assessment      Previous Nutrition Diagnosis:     [ ] Inadequate Energy Intake [ ]Inadequate Oral Intake [ ] Excessive Energy Intake     [ ] Underweight [ ] Increased Nutrient Needs [ ] Overweight/Obesity     [ ] Altered GI Function [ ] Unintended Weight Loss [ ] Food & Nutrition Related Knowledge Deficit [ ] Malnutrition      Nutrition Diagnosis is [ ] ongoing  [ ] resolved [ x] not applicable          New Nutrition Diagnosis: [ ] not applicable    [ ] Inadequate Protein Energy Intake   [ ]Inadequate Oral Intake   [ ] Excessive Energy Intake   [ ] Underweight   [ ] Increased Nutrient Needs   [ ] Overweight/Obesity   [ ] Altered GI Function   [ ] Unintended Weight Loss   [ ] Food & Nutrition Related Knowledge Deficit  [ ] Limited Adherence to nutrition related recommendations   [ ] Malnutrition    [ ] other:          Monitoring and Evaluation:     [x] PO intake [ x] Tolerance to diet prescription [ x] weights [x ] follow up per protocol    [ ] other:

## 2017-10-18 LAB
HCT VFR BLD CALC: 31.3 % — LOW (ref 39–50)
HGB BLD-MCNC: 10 G/DL — LOW (ref 13–17)
MCHC RBC-ENTMCNC: 31.6 PG — SIGNIFICANT CHANGE UP (ref 27–34)
MCHC RBC-ENTMCNC: 31.9 % — LOW (ref 32–36)
MCV RBC AUTO: 99.1 FL — SIGNIFICANT CHANGE UP (ref 80–100)
NRBC # FLD: 0 — SIGNIFICANT CHANGE UP
PLATELET # BLD AUTO: 81 K/UL — LOW (ref 150–400)
PMV BLD: 14.4 FL — HIGH (ref 7–13)
RBC # BLD: 3.16 M/UL — LOW (ref 4.2–5.8)
RBC # FLD: 16.5 % — HIGH (ref 10.3–14.5)
WBC # BLD: 10.09 K/UL — SIGNIFICANT CHANGE UP (ref 3.8–10.5)
WBC # FLD AUTO: 10.09 K/UL — SIGNIFICANT CHANGE UP (ref 3.8–10.5)

## 2017-10-18 PROCEDURE — 99233 SBSQ HOSP IP/OBS HIGH 50: CPT | Mod: GC

## 2017-10-18 PROCEDURE — 71010: CPT | Mod: 26

## 2017-10-18 RX ORDER — IPRATROPIUM/ALBUTEROL SULFATE 18-103MCG
3 AEROSOL WITH ADAPTER (GRAM) INHALATION ONCE
Qty: 0 | Refills: 0 | Status: COMPLETED | OUTPATIENT
Start: 2017-10-18 | End: 2017-10-18

## 2017-10-18 RX ADMIN — APIXABAN 5 MILLIGRAM(S): 2.5 TABLET, FILM COATED ORAL at 05:42

## 2017-10-18 RX ADMIN — DIVALPROEX SODIUM 500 MILLIGRAM(S): 500 TABLET, DELAYED RELEASE ORAL at 05:43

## 2017-10-18 RX ADMIN — Medication 0.25 MILLIGRAM(S): at 21:41

## 2017-10-18 RX ADMIN — VORICONAZOLE 200 MILLIGRAM(S): 10 INJECTION, POWDER, LYOPHILIZED, FOR SOLUTION INTRAVENOUS at 17:05

## 2017-10-18 RX ADMIN — DIVALPROEX SODIUM 500 MILLIGRAM(S): 500 TABLET, DELAYED RELEASE ORAL at 17:06

## 2017-10-18 RX ADMIN — PANTOPRAZOLE SODIUM 40 MILLIGRAM(S): 20 TABLET, DELAYED RELEASE ORAL at 17:06

## 2017-10-18 RX ADMIN — PANTOPRAZOLE SODIUM 40 MILLIGRAM(S): 20 TABLET, DELAYED RELEASE ORAL at 05:42

## 2017-10-18 RX ADMIN — ALBUTEROL 2 PUFF(S): 90 AEROSOL, METERED ORAL at 17:06

## 2017-10-18 RX ADMIN — OXYCODONE HYDROCHLORIDE 10 MILLIGRAM(S): 5 TABLET ORAL at 22:11

## 2017-10-18 RX ADMIN — APIXABAN 5 MILLIGRAM(S): 2.5 TABLET, FILM COATED ORAL at 17:06

## 2017-10-18 RX ADMIN — BUDESONIDE AND FORMOTEROL FUMARATE DIHYDRATE 2 PUFF(S): 160; 4.5 AEROSOL RESPIRATORY (INHALATION) at 21:42

## 2017-10-18 RX ADMIN — Medication 100 MILLIGRAM(S): at 17:06

## 2017-10-18 RX ADMIN — Medication 100 MILLIGRAM(S): at 05:42

## 2017-10-18 RX ADMIN — Medication 650 MILLIGRAM(S): at 00:11

## 2017-10-18 RX ADMIN — ALBUTEROL 2 PUFF(S): 90 AEROSOL, METERED ORAL at 12:26

## 2017-10-18 RX ADMIN — Medication 0.25 MILLIGRAM(S): at 13:05

## 2017-10-18 RX ADMIN — Medication 1 MILLIGRAM(S): at 13:05

## 2017-10-18 RX ADMIN — VORICONAZOLE 200 MILLIGRAM(S): 10 INJECTION, POWDER, LYOPHILIZED, FOR SOLUTION INTRAVENOUS at 05:43

## 2017-10-18 RX ADMIN — Medication 2 PUFF(S): at 12:26

## 2017-10-18 RX ADMIN — ALBUTEROL 2 PUFF(S): 90 AEROSOL, METERED ORAL at 21:42

## 2017-10-18 RX ADMIN — Medication 30 MILLIGRAM(S): at 05:43

## 2017-10-18 RX ADMIN — Medication 325 MILLIGRAM(S): at 13:05

## 2017-10-18 RX ADMIN — ALBUTEROL 2 PUFF(S): 90 AEROSOL, METERED ORAL at 05:42

## 2017-10-18 RX ADMIN — OXYCODONE HYDROCHLORIDE 10 MILLIGRAM(S): 5 TABLET ORAL at 21:41

## 2017-10-18 RX ADMIN — Medication 325 MILLIGRAM(S): at 21:41

## 2017-10-18 RX ADMIN — BUDESONIDE AND FORMOTEROL FUMARATE DIHYDRATE 2 PUFF(S): 160; 4.5 AEROSOL RESPIRATORY (INHALATION) at 12:26

## 2017-10-18 RX ADMIN — Medication 2 PUFF(S): at 17:06

## 2017-10-18 RX ADMIN — Medication 0.25 MILLIGRAM(S): at 05:42

## 2017-10-18 RX ADMIN — ALBUTEROL 2 PUFF(S): 90 AEROSOL, METERED ORAL at 02:07

## 2017-10-18 RX ADMIN — ATORVASTATIN CALCIUM 80 MILLIGRAM(S): 80 TABLET, FILM COATED ORAL at 21:41

## 2017-10-18 RX ADMIN — Medication 325 MILLIGRAM(S): at 05:42

## 2017-10-18 RX ADMIN — Medication 2 PUFF(S): at 02:08

## 2017-10-18 RX ADMIN — Medication 81 MILLIGRAM(S): at 13:05

## 2017-10-18 NOTE — PROGRESS NOTE ADULT - SUBJECTIVE AND OBJECTIVE BOX
Rehab Progress note 9/19:  Patient is a 47 year old male with a PMHx of sarcoidosis, HTN, COPD, and asthma who presented to the ED on 9/10/17 with severe leg spasms. These intermittent left leg spasms began suddenly two days PTA in the patient's home and became more regular and worsening in contracture. The spasm began in the quad leading to severe erratic left leg movements that can last from seconds to minutes. Once the leg spasm and contracture stops, an "intense tingling and pain sensation" which starts in the foot and radiates up the left side of his body to his neck. Once the tingling sensation reached his neck, the patient became faint, but did not fully lose consciousness. The patient also had a HA at the time of the spasm and tingling which was diffuse throughout the head and rated a 10/10 on a pain scale.   On admission pt found to have moderate inspiratory and expiratory wheezes. RVP 9/11 showed + rhino and enteroviruses. Initial CT head negative. Treated for COPD exacerbation. RRT on tele floor for CVA vs. seizure activity. AMS with obtundation, intubated for airway and transferred to MICU. On Propofol/Neosynephrine. Seen by neuro with recs for MRI, EEG 24 hr w/ video, and valproic acid load. Concern for new fat emboli seen on CT head- however no identifiable source. 9/13: MRI-  bilateral (R>L) MCA and right PCA infarcts in the frontoparietal and occipital areas which could be cardioembolic in source. TTE- positive bubble study.     Interval history 10/18:   Patient with prolonged hospital course. Started on Eliquis for paroxysmal Afib. Developed persistent hemoptysis. 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 Patient followed by ID and started on broad spectrum Abx and Voriconazole to target aspergillus. Bronchoscopy on 9/21 showed DAVID bleed. Patient s/p thoractomy with DAVID lung lobectomy on 9/24, then subsequently return to OR x 2 for hemothorax. OR cultures negative; path from OR suggestive of aspergillosis. Patient to continue voriconazole until 11/4 per ID. Currently on steroids for COPD exacerbation/sarcoidosis. Chest tube placed to suction, however with continuous air leak, patient not tolerating waterseal trials. Plan for blood patch in AM.     Seen and examined. C/o mild pain at site of chest tube insertion. Denies SOB at rest.     REVIEW OF SYSTEMS  Constitutional - No fever, No fatigue  HEENT - No visual disturbances, No difficulty hearing,  No neck pain  Respiratory - As above  Cardiovascular - No chest pain, No palpitations  Gastrointestinal - No abdominal pain, No nausea, No vomiting, No diarrhea, No constipation  Genitourinary - No dysuria, No frequency, No hematuria, No incontinence  Neurological - No headaches, No loss of strength, No numbness  Skin - No itching, No rashes  Endocrine - No temperature intolerance  Musculoskeletal - No joint pain, No joint swelling, No muscle pain  Psychiatric - No depression, No anxiety  All other review of systems negative    PAST MEDICAL & SURGICAL HISTORY  History of pneumothorax  COPD (chronic obstructive pulmonary disease)  Asthma  Hypertension  Sarcoidosis diagnosed 2006  Pulmonary aspergillosis at [place] on biopsy (which ultimately was complicated by post-op hemoptysis).     SOCIAL HISTORY  Smoking - Denied  EtOH - Denied   Drugs - Denied    FUNCTIONAL HISTORY  Left hand dominant  Lives alone in an apartment on 6th floor. + Elevator. PTA reports had to take his time if going up 6 flights of stairs. Did not require home O2.  Independent in ambulation, ADL's, transfers prior to hospitalization.  Pt on disability; receives care at [facility] through [facility]    CURRENT FUNCTIONAL STATUS  Bed mobility - Eval  Transfers - Contact guard  Gait - 25' RW x 2 one person assist. Observed ambulating with PT with RW, without O2 via nasal canula--required several rest breaks for shortness of breath.   ADL's - Improved LUE function    FAMILY HISTORY   Family history of pancreatic cancer (Father)  Family history of essential hypertension (Father)    ALLERGIES  No Known Allergies    VITALS  T(C): 36.8 (10-18-17 @ 11:23)  T(F): 98.3 (10-18-17 @ 11:23), Max: 98.3 (10-18-17 @ 07:55)  HR: 83 (10-18-17 @ 11:23) (73 - 96)  BP: 116/74 (10-18-17 @ 11:23) (109/70 - 133/85)  RR:  (16 - 18)  SpO2:  (99% - 100%)  Wt(kg): --    PHYSICAL EXAM  Constitutional - NAD, Comfortable  HEENT - NCAT, EOMI  Neck - Supple  Chest - Coarse, scattered wheeze bilaterally + chest tube  Cardiovascular - RRR, S1S2  Abdomen - BS+, Soft, NTND  Extremities - B/l LE 2+ edema  Neurologic Exam -                    Cognitive - Awake, Alert, AAO to self, place, date, year, situation     Communication - Fluent, No dysarthria     Cranial Nerves - CN 2-12 grossly intact     Motor -                     LEFT    UE - ShAB 3/5, EF 3+/5, EE 3+/5, WE 3+/5,  3+/5                    RIGHT UE - ShAB 5/5, EF 5/5, EE 5/5, WE 5/5,  5/5                    LEFT    LE - HF 5/5, KE 5/5, DF 5/5, PF 5/5                    RIGHT LE - HF 5/5, KE 5/5, DF 5/5, PF 5/5        Sensory - Intact to light touch     Reflexes - DTR intact and symmetrical  Psychiatric - Affect WNL    RECENT LABS/IMAGING                        10.0   10.09 )-----------( 81       ( 18 Oct 2017 05:30 )             31.3     10-17    139  |  99  |  14  ----------------------------<  96  4.4   |  29  |  0.98    Ca    8.5      17 Oct 2017 07:00    MEDICATIONS   MEDICATIONS  (STANDING):  ALBUTerol    90 MICROgram(s) HFA Inhaler 2 Puff(s) Inhalation every 4 hours  ALPRAZolam 0.25 milliGRAM(s) Oral three times a day  apixaban 5 milliGRAM(s) Oral every 12 hours  aspirin  chewable 81 milliGRAM(s) Oral daily  atorvastatin 80 milliGRAM(s) Oral at bedtime  buDESOnide 160 MICROgram(s)/formoterol 4.5 MICROgram(s) Inhaler 2 Puff(s) Inhalation every 12 hours  diVALproex  milliGRAM(s) Oral two times a day  docusate sodium 100 milliGRAM(s) Oral two times a day  ferrous    sulfate 325 milliGRAM(s) Oral every 8 hours  folic acid 1 milliGRAM(s) Oral daily  ipratropium 17 MICROgram(s) HFA Inhaler 2 Puff(s) Inhalation every 8 hours  pantoprazole    Tablet 40 milliGRAM(s) Oral two times a day before meals  polyethylene glycol 3350 17 Gram(s) Oral at bedtime  predniSONE   Tablet   Oral   predniSONE   Tablet 30 milliGRAM(s) Oral daily  voriconazole 200 milliGRAM(s) Oral every 12 hours    MEDICATIONS  (PRN):  acetaminophen    Suspension 650 milliGRAM(s) Oral every 6 hours PRN For Temp greater than 38 C (100.4 F)  acetaminophen   Tablet. 650 milliGRAM(s) Oral every 6 hours PRN Mild Pain (1 - 3)  aluminum hydroxide/magnesium hydroxide/simethicone Suspension 30 milliLiter(s) Oral every 6 hours PRN Dyspepsia  oxyCODONE    IR 5 milliGRAM(s) Oral every 4 hours PRN Moderate Pain (4 - 6)  oxyCODONE    IR 10 milliGRAM(s) Oral every 4 hours PRN Severe Pain (7 - 10)      ASSESSMENT/PLAN  46 y/o M with bilateral MCA and R PCA CVA, hemoptysis/aspergillosis s/p DAVID thoracotomy, presenting with left hemiparesis, deconditioning likely secondary to prolonged hospital stay and overall pulmonary status, as well as functional, gait, ADL impairments.    Disposition -  Recommend ACUTE inpatient rehabilitation, ONCE MEDICALLY OPTIMIZED, for the functional deficits consisting of 3 hours of therapy/day & 24 hour RN/daily PMR physician for comorbid medical management. Will continue to follow for ongoing rehab needs and recommendations.   PT - ROM, Bed mobility, Transfers, Ambulation with assistive device  OT - ADLs, ROM  Precautions - Falls, Cardiac, pulmonary, seizure  DVT Prophylaxis - Eliquis  Weight bearing status - WBAT  Skin - Turn Q2hrs  Diet - Regular Rehab Progress note 9/19:  Patient is a 47 year old male with a PMHx of sarcoidosis, HTN, COPD, and asthma who presented to the ED on 9/10/17 with severe leg spasms. These intermittent left leg spasms began suddenly two days PTA in the patient's home and became more regular and worsening in contracture. The spasm began in the quad leading to severe erratic left leg movements that can last from seconds to minutes. Once the leg spasm and contracture stops, an "intense tingling and pain sensation" which starts in the foot and radiates up the left side of his body to his neck. Once the tingling sensation reached his neck, the patient became faint, but did not fully lose consciousness. The patient also had a HA at the time of the spasm and tingling which was diffuse throughout the head and rated a 10/10 on a pain scale.   On admission pt found to have moderate inspiratory and expiratory wheezes. RVP 9/11 showed + rhino and enteroviruses. Initial CT head negative. Treated for COPD exacerbation. RRT on tele floor for CVA vs. seizure activity. AMS with obtundation, intubated for airway and transferred to MICU. On Propofol/Neosynephrine. Seen by neuro with recs for MRI, EEG 24 hr w/ video, and valproic acid load. Concern for new fat emboli seen on CT head- however no identifiable source. 9/13: MRI-  bilateral (R>L) MCA and right PCA infarcts in the frontoparietal and occipital areas which could be cardioembolic in source. TTE- positive bubble study.     Interval history 10/18:   Patient with prolonged hospital course. Started on Eliquis for paroxysmal Afib. Developed persistent hemoptysis. 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 Patient followed by ID and started on broad spectrum Abx and Voriconazole to target aspergillus. Bronchoscopy on 9/21 showed DAVID bleed. Patient s/p thoractomy with DAVID lung lobectomy on 9/24, then subsequently return to OR x 2 for hemothorax. OR cultures negative; path from OR suggestive of aspergillosis. Patient to continue voriconazole until 11/4 per ID. Currently on steroids for COPD exacerbation/sarcoidosis. Chest tube placed to suction, however with continuous air leak, patient not tolerating waterseal trials. Plan for blood patch in AM.     Seen and examined. C/o mild pain at site of chest tube insertion. Denies SOB at rest.     REVIEW OF SYSTEMS  Constitutional - No fever, No fatigue  HEENT - No visual disturbances, No difficulty hearing,  No neck pain  Respiratory - As above  Cardiovascular - No chest pain, No palpitations  Gastrointestinal - No abdominal pain, No nausea, No vomiting, No diarrhea, No constipation  Genitourinary - No dysuria, No frequency, No hematuria, No incontinence  Neurological - No headaches, No loss of strength, No numbness  Skin - No itching, No rashes  Endocrine - No temperature intolerance  Musculoskeletal - No joint pain, No joint swelling, No muscle pain  Psychiatric - No depression, No anxiety  All other review of systems negative    PAST MEDICAL & SURGICAL HISTORY  History of pneumothorax  COPD (chronic obstructive pulmonary disease)  Asthma  Hypertension  Sarcoidosis diagnosed 2006  Pulmonary aspergillosis at [place] on biopsy (which ultimately was complicated by post-op hemoptysis).     SOCIAL HISTORY  Smoking - Denied  EtOH - Denied   Drugs - Denied    FUNCTIONAL HISTORY  Left hand dominant  Lives alone in an apartment on 6th floor. + Elevator. PTA reports had to take his time if going up 6 flights of stairs. Did not require home O2.  Independent in ambulation, ADL's, transfers prior to hospitalization.  Pt on disability; receives care at [facility]    CURRENT FUNCTIONAL STATUS  Bed mobility - Eval  Transfers - Contact guard  Gait - 25' RW x 2 one person assist. Observed ambulating with PT with RW, without O2 via nasal canula--required several rest breaks for shortness of breath.   ADL's - Improved LUE function    FAMILY HISTORY   Family history of pancreatic cancer (Father)  Family history of essential hypertension (Father)    ALLERGIES  No Known Allergies    VITALS  T(C): 36.8 (10-18-17 @ 11:23)  T(F): 98.3 (10-18-17 @ 11:23), Max: 98.3 (10-18-17 @ 07:55)  HR: 83 (10-18-17 @ 11:23) (73 - 96)  BP: 116/74 (10-18-17 @ 11:23) (109/70 - 133/85)  RR:  (16 - 18)  SpO2:  (99% - 100%)  Wt(kg): --    PHYSICAL EXAM  Constitutional - NAD, Comfortable  HEENT - NCAT, EOMI  Neck - Supple  Chest - Coarse, scattered wheeze bilaterally + chest tube  Cardiovascular - RRR, S1S2  Abdomen - BS+, Soft, NTND  Extremities - B/l LE 2+ edema  Neurologic Exam -                    Cognitive - Awake, Alert, AAO to self, place, date, year, situation     Communication - Fluent, No dysarthria     Cranial Nerves - CN 2-12 grossly intact     Motor -                     LEFT    UE - ShAB 3/5, EF 3+/5, EE 3+/5, WE 3+/5,  3+/5                    RIGHT UE - ShAB 5/5, EF 5/5, EE 5/5, WE 5/5,  5/5                    LEFT    LE - HF 5/5, KE 5/5, DF 5/5, PF 5/5                    RIGHT LE - HF 5/5, KE 5/5, DF 5/5, PF 5/5        Sensory - Intact to light touch     Reflexes - DTR intact and symmetrical  Psychiatric - Affect WNL    RECENT LABS/IMAGING                        10.0   10.09 )-----------( 81       ( 18 Oct 2017 05:30 )             31.3     10-17    139  |  99  |  14  ----------------------------<  96  4.4   |  29  |  0.98    Ca    8.5      17 Oct 2017 07:00    MEDICATIONS   MEDICATIONS  (STANDING):  ALBUTerol    90 MICROgram(s) HFA Inhaler 2 Puff(s) Inhalation every 4 hours  ALPRAZolam 0.25 milliGRAM(s) Oral three times a day  apixaban 5 milliGRAM(s) Oral every 12 hours  aspirin  chewable 81 milliGRAM(s) Oral daily  atorvastatin 80 milliGRAM(s) Oral at bedtime  buDESOnide 160 MICROgram(s)/formoterol 4.5 MICROgram(s) Inhaler 2 Puff(s) Inhalation every 12 hours  diVALproex  milliGRAM(s) Oral two times a day  docusate sodium 100 milliGRAM(s) Oral two times a day  ferrous    sulfate 325 milliGRAM(s) Oral every 8 hours  folic acid 1 milliGRAM(s) Oral daily  ipratropium 17 MICROgram(s) HFA Inhaler 2 Puff(s) Inhalation every 8 hours  pantoprazole    Tablet 40 milliGRAM(s) Oral two times a day before meals  polyethylene glycol 3350 17 Gram(s) Oral at bedtime  predniSONE   Tablet   Oral   predniSONE   Tablet 30 milliGRAM(s) Oral daily  voriconazole 200 milliGRAM(s) Oral every 12 hours    MEDICATIONS  (PRN):  acetaminophen    Suspension 650 milliGRAM(s) Oral every 6 hours PRN For Temp greater than 38 C (100.4 F)  acetaminophen   Tablet. 650 milliGRAM(s) Oral every 6 hours PRN Mild Pain (1 - 3)  aluminum hydroxide/magnesium hydroxide/simethicone Suspension 30 milliLiter(s) Oral every 6 hours PRN Dyspepsia  oxyCODONE    IR 5 milliGRAM(s) Oral every 4 hours PRN Moderate Pain (4 - 6)  oxyCODONE    IR 10 milliGRAM(s) Oral every 4 hours PRN Severe Pain (7 - 10)      ASSESSMENT/PLAN  46 y/o M with bilateral MCA and R PCA CVA, hemoptysis/aspergillosis s/p DAIVD thoracotomy, presenting with left hemiparesis, deconditioning likely secondary to prolonged hospital stay and overall pulmonary status, as well as functional, gait, ADL impairments.    Disposition -  Recommend ACUTE inpatient rehabilitation, ONCE MEDICALLY OPTIMIZED, for the functional deficits consisting of 3 hours of therapy/day & 24 hour RN/daily PMR physician for comorbid medical management. Will continue to follow for ongoing rehab needs and recommendations.   PT - ROM, Bed mobility, Transfers, Ambulation with assistive device  OT - ADLs, ROM  Precautions - Falls, Cardiac, pulmonary, seizure  DVT Prophylaxis - Eliquis  Weight bearing status - WBAT  Diet - Regular

## 2017-10-18 NOTE — PROGRESS NOTE ADULT - PROBLEM SELECTOR PLAN 1
9/27: he seems to be doing better today: for extubation  9/28: extubated: alert and awake and responding: He is on zosyn any way with voriconazole!  9/29: chest x-ray today , to me looks better then yesterday: There is no official report yet:  10/02: doing ok  10/3: on eliquis: toelrating well: no hemoptysis  10/4 : stable  10/05: persistent left upper air space following DAVID lobectomy: defer to thoracic surgery  10/06: stable?: no HEMOPTYSIS  10/7: resolved  10/8: cont prednisone: pneumothorax has resolved  10/09: resolved  10/10: no more hemoptysis  1/11: resolved after surgery and despite eliquis: stable  11/12: stable: s./p surgery: has mod pneumothorax: on the elft side: would defer to ct surgery: back on suction!  10/13: doing   ok  10/14: decrease dose to 30 mg for a few days and then reduce it to 20 mg: increase ptx today on chest radiograph: dw Dr Caldera  10/15: resolved: ptx on the left side is better today on the chest radiogrpah  10/16: small tiny left ptx: defer to thoracici  10/17: resolved  10/18: doing ok

## 2017-10-18 NOTE — PROGRESS NOTE ADULT - SUBJECTIVE AND OBJECTIVE BOX
Patient is a 47y old  Male who presents with a chief complaint of "My leg wouldn't stop shaking." (14 Sep 2017 09:59)    doing ok  no sob now  morning events noted: did not tolerate off suction  Any change in ROS:     MEDICATIONS  (STANDING):  ALBUTerol    90 MICROgram(s) HFA Inhaler 2 Puff(s) Inhalation every 4 hours  ALPRAZolam 0.25 milliGRAM(s) Oral three times a day  apixaban 5 milliGRAM(s) Oral every 12 hours  aspirin  chewable 81 milliGRAM(s) Oral daily  atorvastatin 80 milliGRAM(s) Oral at bedtime  buDESOnide 160 MICROgram(s)/formoterol 4.5 MICROgram(s) Inhaler 2 Puff(s) Inhalation every 12 hours  diVALproex  milliGRAM(s) Oral two times a day  docusate sodium 100 milliGRAM(s) Oral two times a day  ferrous    sulfate 325 milliGRAM(s) Oral every 8 hours  folic acid 1 milliGRAM(s) Oral daily  ipratropium 17 MICROgram(s) HFA Inhaler 2 Puff(s) Inhalation every 8 hours  pantoprazole    Tablet 40 milliGRAM(s) Oral two times a day before meals  polyethylene glycol 3350 17 Gram(s) Oral at bedtime  predniSONE   Tablet   Oral   predniSONE   Tablet 30 milliGRAM(s) Oral daily  voriconazole 200 milliGRAM(s) Oral every 12 hours    MEDICATIONS  (PRN):  acetaminophen    Suspension 650 milliGRAM(s) Oral every 6 hours PRN For Temp greater than 38 C (100.4 F)  acetaminophen   Tablet. 650 milliGRAM(s) Oral every 6 hours PRN Mild Pain (1 - 3)  aluminum hydroxide/magnesium hydroxide/simethicone Suspension 30 milliLiter(s) Oral every 6 hours PRN Dyspepsia  oxyCODONE    IR 5 milliGRAM(s) Oral every 4 hours PRN Moderate Pain (4 - 6)  oxyCODONE    IR 10 milliGRAM(s) Oral every 4 hours PRN Severe Pain (7 - 10)    Vital Signs Last 24 Hrs  T(C): 36.8 (18 Oct 2017 11:23), Max: 36.8 (18 Oct 2017 05:38)  T(F): 98.3 (18 Oct 2017 11:23), Max: 98.3 (18 Oct 2017 07:55)  HR: 83 (18 Oct 2017 11:23) (73 - 96)  BP: 116/74 (18 Oct 2017 11:23) (109/70 - 133/85)  BP(mean): --  RR: 16 (18 Oct 2017 11:23) (16 - 18)  SpO2: 100% (18 Oct 2017 11:23) (99% - 100%)    I&O's Summary    17 Oct 2017 07:01  -  18 Oct 2017 07:00  --------------------------------------------------------  IN: 900 mL / OUT: 1005 mL / NET: -105 mL    18 Oct 2017 07:01  -  18 Oct 2017 14:51  --------------------------------------------------------  IN: 0 mL / OUT: 320 mL / NET: -320 mL          Physical Exam:   GENERAL: NAD, well-groomed, well-developed  HEENT: GIOVANI/   Atraumatic, Normocephalic  ENMT: No tonsillar erythema, exudates, or enlargement; Moist mucous membranes, Good dentition, No lesions  NECK: Supple, No JVD, Normal thyroid  CHEST/LUNG: Clear to auscultaion" poor air entry   CVS: Regular rate and rhythm; No murmurs, rubs, or gallops  GI: : Soft, Nontender, Nondistended; Bowel sounds present  NERVOUS SYSTEM:  Alert & Oriented X3  EXTREMITIES:  2+ Peripheral Pulses, No clubbing, cyanosis, or edema  LYMPH: No lymphadenopathy noted  SKIN: No rashes or lesions  ENDOCRINOLOGY: No Thyromegaly  PSYCH: Appropriate    Labs:                              10.0   10.09 )-----------( 81       ( 18 Oct 2017 05:30 )             31.3                         11.2   12.58 )-----------( 91       ( 17 Oct 2017 07:00 )             35.8                         10.4   10.62 )-----------( 92       ( 16 Oct 2017 05:37 )             32.7                         9.8    8.77  )-----------( 94       ( 15 Oct 2017 05:45 )             31.7     10-17    139  |  99  |  14  ----------------------------<  96  4.4   |  29  |  0.98  10-15    142  |  102  |  19  ----------------------------<  106<H>  4.7   |  30  |  0.82    Ca    8.5      17 Oct 2017 07:00      CAPILLARY BLOOD GLUCOSE                Cultures:           < from: Xray Chest 1 View AP -PORTABLE-Routine (10.17.17 @ 07:47) >  INTERPRETATION:  CLINICAL INDICATION: Chest tube on waterseal. Evaluate   pneumothorax. Sarcoidosis. Follow-up.    TECHNIQUE: 2 frontal views of the chest.    COMPARISON: Chest x-ray 10/16/2017 6:48 AM    FINDINGS:   10/16/2017 at 4:49 PM: Left chest tube is noted. There is a moderate left   pneumothorax, significantly increased in size from prior examination.   There are unchanged bilateral lung opacities. Trace right pleural   effusion. The heart is not enlarged. There is left chest subcutaneous   emphysema.    Findings were discussed with YUDITH Chan on 10/16/2017 at 5:45 PM by Dr. Torrez.    10/17/2017 at 6:40 AM: There is a left-sided chest tube. There is a small   left pneumothorax, decreased in size from prior examination. Bilateral   lung opacities are unchanged. Trace right pleural effusion is unchanged.  New trace left pleural effusion.. Heart size is normal. There is left   chest subcutaneous emphysema.    IMPRESSION: On the latest examination, there is a small left pneumothorax   which is decreased in size.    New small left pleural effusion.    < end of copied text >                    Studies  Chest X-RAY  CT SCAN Chest   Venous Dopplers: LE:   CT Abdomen  Others

## 2017-10-18 NOTE — PROGRESS NOTE ADULT - SUBJECTIVE AND OBJECTIVE BOX
Subjective: " I can't wait to go home"  Pt became SOB and tachycardic after chest tube on waterseal trial today; symptoms improved after Chest tube placed back to suction    Vital Signs:  Vital Signs Last 24 Hrs  T(C): 36.8 (10-18-17 @ 11:23), Max: 36.8 (10-18-17 @ 05:38)  T(F): 98.3 (10-18-17 @ 11:23), Max: 98.3 (10-18-17 @ 07:55)  HR: 83 (10-18-17 @ 11:23) (73 - 96)  BP: 116/74 (10-18-17 @ 11:23) (109/70 - 133/85)  RR: 16 (10-18-17 @ 11:23) (16 - 18)  SpO2: 100% (10-18-17 @ 11:23) (99% - 100%) on (O2)    Telemetry/Alarms:  General: WN/WD NAD  Neurology: Awake, nonfocal, BETH x 4  Eyes: Scleras clear, PERRLA/ EOMI, Gross vision intact  ENT:Gross hearing intact, grossly patent pharynx, no stridor  Neck: Neck supple, trachea midline, No JVD,   Respiratory: CTA B/L, No wheezing, rales, rhonchi  CV: RRR, S1S2, no murmurs, rubs or gallops  Abdominal: Soft, NT, ND +BS,   Extremities: No edema, + peripheral pulses  Skin: No Rashes, Hematoma, Ecchymosis  Lymphatic: No Neck, axilla, groin LAD  Psych: Oriented x 3, normal affect  Incisions: c,d,i  Tubes: L chest tube to suction, + expiratory air leak  Relevant labs, radiology and Medications reviewed                        10.0   10.09 )-----------( 81       ( 18 Oct 2017 05:30 )             31.3     10-17    139  |  99  |  14  ----------------------------<  96  4.4   |  29  |  0.98    Ca    8.5      17 Oct 2017 07:00        MEDICATIONS  (STANDING):  ALBUTerol    90 MICROgram(s) HFA Inhaler 2 Puff(s) Inhalation every 4 hours  ALPRAZolam 0.25 milliGRAM(s) Oral three times a day  apixaban 5 milliGRAM(s) Oral every 12 hours  aspirin  chewable 81 milliGRAM(s) Oral daily  atorvastatin 80 milliGRAM(s) Oral at bedtime  buDESOnide 160 MICROgram(s)/formoterol 4.5 MICROgram(s) Inhaler 2 Puff(s) Inhalation every 12 hours  diVALproex  milliGRAM(s) Oral two times a day  docusate sodium 100 milliGRAM(s) Oral two times a day  ferrous    sulfate 325 milliGRAM(s) Oral every 8 hours  folic acid 1 milliGRAM(s) Oral daily  ipratropium 17 MICROgram(s) HFA Inhaler 2 Puff(s) Inhalation every 8 hours  pantoprazole    Tablet 40 milliGRAM(s) Oral two times a day before meals  polyethylene glycol 3350 17 Gram(s) Oral at bedtime  predniSONE   Tablet   Oral   predniSONE   Tablet 30 milliGRAM(s) Oral daily  voriconazole 200 milliGRAM(s) Oral every 12 hours    MEDICATIONS  (PRN):  acetaminophen    Suspension 650 milliGRAM(s) Oral every 6 hours PRN For Temp greater than 38 C (100.4 F)  acetaminophen   Tablet. 650 milliGRAM(s) Oral every 6 hours PRN Mild Pain (1 - 3)  aluminum hydroxide/magnesium hydroxide/simethicone Suspension 30 milliLiter(s) Oral every 6 hours PRN Dyspepsia  oxyCODONE    IR 5 milliGRAM(s) Oral every 4 hours PRN Moderate Pain (4 - 6)  oxyCODONE    IR 10 milliGRAM(s) Oral every 4 hours PRN Severe Pain (7 - 10)    Pertinent Physical Exam  I&O's Summary    17 Oct 2017 07:01  -  18 Oct 2017 07:00  --------------------------------------------------------  IN: 900 mL / OUT: 1005 mL / NET: -105 mL    18 Oct 2017 07:01  -  18 Oct 2017 13:37  --------------------------------------------------------  IN: 0 mL / OUT: 320 mL / NET: -320 mL        Assessment  47y Male  w/ PAST MEDICAL & SURGICAL HISTORY:  History of pneumothorax: History of 3 prior pneumonthoracies.  COPD (chronic obstructive pulmonary disease)  Asthma  Hypertension  Sarcoidosis  No significant past surgical history  admitted with complaints of Patient is a 47y old  Male who presents with a chief complaint of "My leg wouldn't stop shaking." (14 Sep 2017 09:59)  preop CVA L hemiparesis. Now POD 24 from L thoracotomy DAVID lobectomy with RTOR x2 for hemothorax.  . Postoperative course/issues: L PTC placed by IR for PTX.  Pt has continuous air leak and is not tolerating waterseal trials.    PLAN  Neuro: Pain management  Pulm: Encourage coughing, deep breathing and use of incentive spirometry. Wean off supplemental oxygen as able. Daily CXR.   Cardio: Monitor telemetry/alarms  GI: Tolerating diet. Continue stool softeners.  Renal: monitor urine output, supplement electrolytes as needed  Vasc: Heparin SC/SCDs for DVT prophylaxis  Heme: Stable H/H. .   ID: Off antibiotics. Stable.  Therapy: OOB/ambulate  Tubes: Monitor Chest tube output; contiinue chest tube to -20suction at all times  Disposition: Plan to do blood patch tomorrow; will remove drain when air leak resolves and pt can tolerate waterseal trial.  Discharge planning to rehab  Discussed with Cardiothoracic Team at AM rounds.

## 2017-10-18 NOTE — PROGRESS NOTE ADULT - PROBLEM SELECTOR PLAN 6
on steroids at this time: it was given only for a short duration: thinking his wheezing is secondary to rhinovirus infection: It will cont till tomorrow: thereafter would taper it down to off in next 2-3  10/1: change prednisone to 10 mg daily  10/02: cont prednisone  10/03: trop prednisone after today's dose  10/4 stop prednisone tomorrow  10/5: dc prednisone today : pt was not on any treatment for sarcoidosis before  10/06: OFF PREDNISONE NOW  10/7: start prednisone 20 mg per day  10/09: started back on IV steroids given his increased wheezing!!  10/10: wheezing better: decrease steroids to 20 mg q 8  10//: decrease and change prednisone to 40 mg daily: probably will be discharged on some prednisone  10/13: decrease prednisone to 30 mg tomorrow  10/15: on 30 mg of prednisone  10/16: on tapering steroids  10/17: stable: cont prednisone , will probably be dced on 20 mg of prednisone chan  10/18: stable   10/18: stable

## 2017-10-19 LAB
BUN SERPL-MCNC: 18 MG/DL — SIGNIFICANT CHANGE UP (ref 7–23)
CALCIUM SERPL-MCNC: 8.1 MG/DL — LOW (ref 8.4–10.5)
CHLORIDE SERPL-SCNC: 102 MMOL/L — SIGNIFICANT CHANGE UP (ref 98–107)
CO2 SERPL-SCNC: 31 MMOL/L — SIGNIFICANT CHANGE UP (ref 22–31)
CREAT SERPL-MCNC: 0.93 MG/DL — SIGNIFICANT CHANGE UP (ref 0.5–1.3)
GLUCOSE SERPL-MCNC: 84 MG/DL — SIGNIFICANT CHANGE UP (ref 70–99)
HCT VFR BLD CALC: 32.6 % — LOW (ref 39–50)
HGB BLD-MCNC: 10.3 G/DL — LOW (ref 13–17)
MCHC RBC-ENTMCNC: 31.6 % — LOW (ref 32–36)
MCHC RBC-ENTMCNC: 31.6 PG — SIGNIFICANT CHANGE UP (ref 27–34)
MCV RBC AUTO: 100 FL — SIGNIFICANT CHANGE UP (ref 80–100)
NRBC # FLD: 0 — SIGNIFICANT CHANGE UP
PLATELET # BLD AUTO: 74 K/UL — LOW (ref 150–400)
PMV BLD: 13.6 FL — HIGH (ref 7–13)
POTASSIUM SERPL-MCNC: 4.1 MMOL/L — SIGNIFICANT CHANGE UP (ref 3.5–5.3)
POTASSIUM SERPL-SCNC: 4.1 MMOL/L — SIGNIFICANT CHANGE UP (ref 3.5–5.3)
RBC # BLD: 3.26 M/UL — LOW (ref 4.2–5.8)
RBC # FLD: 17 % — HIGH (ref 10.3–14.5)
SODIUM SERPL-SCNC: 141 MMOL/L — SIGNIFICANT CHANGE UP (ref 135–145)
WBC # BLD: 8.41 K/UL — SIGNIFICANT CHANGE UP (ref 3.8–10.5)
WBC # FLD AUTO: 8.41 K/UL — SIGNIFICANT CHANGE UP (ref 3.8–10.5)

## 2017-10-19 PROCEDURE — 71010: CPT | Mod: 26

## 2017-10-19 RX ORDER — ALBUTEROL 90 UG/1
2.5 AEROSOL, METERED ORAL
Qty: 0 | Refills: 0 | Status: DISCONTINUED | OUTPATIENT
Start: 2017-10-19 | End: 2017-10-20

## 2017-10-19 RX ORDER — ALBUTEROL 90 UG/1
2.5 AEROSOL, METERED ORAL
Qty: 0 | Refills: 0 | Status: DISCONTINUED | OUTPATIENT
Start: 2017-10-19 | End: 2017-10-19

## 2017-10-19 RX ADMIN — Medication 100 MILLIGRAM(S): at 05:58

## 2017-10-19 RX ADMIN — DIVALPROEX SODIUM 500 MILLIGRAM(S): 500 TABLET, DELAYED RELEASE ORAL at 17:20

## 2017-10-19 RX ADMIN — VORICONAZOLE 200 MILLIGRAM(S): 10 INJECTION, POWDER, LYOPHILIZED, FOR SOLUTION INTRAVENOUS at 05:58

## 2017-10-19 RX ADMIN — ALBUTEROL 2 PUFF(S): 90 AEROSOL, METERED ORAL at 14:19

## 2017-10-19 RX ADMIN — ALBUTEROL 2 PUFF(S): 90 AEROSOL, METERED ORAL at 09:05

## 2017-10-19 RX ADMIN — APIXABAN 5 MILLIGRAM(S): 2.5 TABLET, FILM COATED ORAL at 17:22

## 2017-10-19 RX ADMIN — VORICONAZOLE 200 MILLIGRAM(S): 10 INJECTION, POWDER, LYOPHILIZED, FOR SOLUTION INTRAVENOUS at 17:20

## 2017-10-19 RX ADMIN — ALBUTEROL 2.5 MILLIGRAM(S): 90 AEROSOL, METERED ORAL at 21:15

## 2017-10-19 RX ADMIN — BUDESONIDE AND FORMOTEROL FUMARATE DIHYDRATE 2 PUFF(S): 160; 4.5 AEROSOL RESPIRATORY (INHALATION) at 09:06

## 2017-10-19 RX ADMIN — ATORVASTATIN CALCIUM 80 MILLIGRAM(S): 80 TABLET, FILM COATED ORAL at 22:12

## 2017-10-19 RX ADMIN — OXYCODONE HYDROCHLORIDE 5 MILLIGRAM(S): 5 TABLET ORAL at 23:15

## 2017-10-19 RX ADMIN — Medication 2 PUFF(S): at 09:06

## 2017-10-19 RX ADMIN — PANTOPRAZOLE SODIUM 40 MILLIGRAM(S): 20 TABLET, DELAYED RELEASE ORAL at 05:59

## 2017-10-19 RX ADMIN — Medication 2 PUFF(S): at 17:24

## 2017-10-19 RX ADMIN — Medication 325 MILLIGRAM(S): at 14:18

## 2017-10-19 RX ADMIN — Medication 30 MILLIGRAM(S): at 05:59

## 2017-10-19 RX ADMIN — Medication 2 PUFF(S): at 01:55

## 2017-10-19 RX ADMIN — ALBUTEROL 2 PUFF(S): 90 AEROSOL, METERED ORAL at 01:54

## 2017-10-19 RX ADMIN — PANTOPRAZOLE SODIUM 40 MILLIGRAM(S): 20 TABLET, DELAYED RELEASE ORAL at 17:20

## 2017-10-19 RX ADMIN — Medication 325 MILLIGRAM(S): at 06:00

## 2017-10-19 RX ADMIN — Medication 81 MILLIGRAM(S): at 14:19

## 2017-10-19 RX ADMIN — Medication 1 MILLIGRAM(S): at 14:18

## 2017-10-19 RX ADMIN — BUDESONIDE AND FORMOTEROL FUMARATE DIHYDRATE 2 PUFF(S): 160; 4.5 AEROSOL RESPIRATORY (INHALATION) at 22:13

## 2017-10-19 RX ADMIN — Medication 100 MILLIGRAM(S): at 17:20

## 2017-10-19 RX ADMIN — ALBUTEROL 2 PUFF(S): 90 AEROSOL, METERED ORAL at 05:59

## 2017-10-19 RX ADMIN — OXYCODONE HYDROCHLORIDE 5 MILLIGRAM(S): 5 TABLET ORAL at 22:12

## 2017-10-19 RX ADMIN — DIVALPROEX SODIUM 500 MILLIGRAM(S): 500 TABLET, DELAYED RELEASE ORAL at 05:58

## 2017-10-19 RX ADMIN — Medication 325 MILLIGRAM(S): at 22:12

## 2017-10-19 RX ADMIN — APIXABAN 5 MILLIGRAM(S): 2.5 TABLET, FILM COATED ORAL at 05:59

## 2017-10-19 NOTE — PROGRESS NOTE ADULT - PROBLEM SELECTOR PLAN 6
on steroids at this time: it was given only for a short duration: thinking his wheezing is secondary to rhinovirus infection: It will cont till tomorrow: thereafter would taper it down to off in next 2-3  10/1: change prednisone to 10 mg daily  10/02: cont prednisone  10/03: trop prednisone after today's dose  10/4 stop prednisone tomorrow  10/5: dc prednisone today : pt was not on any treatment for sarcoidosis before  10/06: OFF PREDNISONE NOW  10/7: start prednisone 20 mg per day  10/09: started back on IV steroids given his increased wheezing!!  10/10: wheezing better: decrease steroids to 20 mg q 8  10//: decrease and change prednisone to 40 mg daily: probably will be discharged on some prednisone  10/13: decrease prednisone to 30 mg tomorrow  10/15: on 30 mg of prednisone  10/16: on tapering steroids  10/17: stable: cont prednisone , will probably be dced on 20 mg of prednisone chan  10/17: stable   10/18: stable  10/19: cont steroids fro now

## 2017-10-19 NOTE — PROGRESS NOTE ADULT - SUBJECTIVE AND OBJECTIVE BOX
Subjective:Pt w/o complaints. No overnight events noted    Vital Signs:  Vital Signs Last 24 Hrs  T(C): 36.9 (10-19-17 @ 11:55), Max: 37 (10-18-17 @ 16:32)  T(F): 98.5 (10-19-17 @ 11:55), Max: 98.6 (10-18-17 @ 16:32)  HR: 77 (10-19-17 @ 11:55) (68 - 89)  BP: 115/70 (10-19-17 @ 11:55) (104/69 - 138/89)  RR: 18 (10-19-17 @ 11:55) (18 - 18)  SpO2: 100% (10-19-17 @ 11:55) (99% - 100%) on (O2)      Relevant labs, radiology and Medications reviewed                        10.3   8.41  )-----------( 74       ( 19 Oct 2017 05:44 )             32.6     10-19    141  |  102  |  18  ----------------------------<  84  4.1   |  31  |  0.93    Ca    8.1<L>      19 Oct 2017 05:44        MEDICATIONS  (STANDING):  ALBUTerol    90 MICROgram(s) HFA Inhaler 2 Puff(s) Inhalation every 4 hours  apixaban 5 milliGRAM(s) Oral every 12 hours  aspirin  chewable 81 milliGRAM(s) Oral daily  atorvastatin 80 milliGRAM(s) Oral at bedtime  buDESOnide 160 MICROgram(s)/formoterol 4.5 MICROgram(s) Inhaler 2 Puff(s) Inhalation every 12 hours  diVALproex  milliGRAM(s) Oral two times a day  docusate sodium 100 milliGRAM(s) Oral two times a day  ferrous    sulfate 325 milliGRAM(s) Oral every 8 hours  folic acid 1 milliGRAM(s) Oral daily  ipratropium 17 MICROgram(s) HFA Inhaler 2 Puff(s) Inhalation every 8 hours  pantoprazole    Tablet 40 milliGRAM(s) Oral two times a day before meals  polyethylene glycol 3350 17 Gram(s) Oral at bedtime  predniSONE   Tablet   Oral   voriconazole 200 milliGRAM(s) Oral every 12 hours    MEDICATIONS  (PRN):  acetaminophen    Suspension 650 milliGRAM(s) Oral every 6 hours PRN For Temp greater than 38 C (100.4 F)  acetaminophen   Tablet. 650 milliGRAM(s) Oral every 6 hours PRN Mild Pain (1 - 3)  aluminum hydroxide/magnesium hydroxide/simethicone Suspension 30 milliLiter(s) Oral every 6 hours PRN Dyspepsia  oxyCODONE    IR 5 milliGRAM(s) Oral every 4 hours PRN Moderate Pain (4 - 6)  oxyCODONE    IR 10 milliGRAM(s) Oral every 4 hours PRN Severe Pain (7 - 10)      Physical exam  General: WN/WD NAD  Neurology: Awake, nonfocal, BETH x 4, LUE weakness  Eyes: Scleras clear, PERRLA/ EOMI, Gross vision intact  ENT:Gross hearing intact, grossly patent pharynx, no stridor  Neck: Neck supple, trachea midline, No JVD,   Respiratory: CTA B/L, No wheezing, rales, rhonchi  CV: RRR, S1S2, no murmurs, rubs or gallops  Abdominal: Soft, NT, ND +BS,   Extremities: 2+ edema, + peripheral pulses  Skin: No Rashes, Hematoma, Ecchymosis  Lymphatic: No Neck, axilla, groin LAD  Psych: Oriented x 3, normal affect  Incisions: CW C/D/I  Tubes:Lt PTC intact + air leak -20 suc    I&O's Summary    18 Oct 2017 07:01  -  19 Oct 2017 07:00  --------------------------------------------------------  IN: 0 mL / OUT: 471 mL / NET: -471 mL        Assessment  47y Male  w/ PAST MEDICAL & SURGICAL HISTORY:  History of pneumothorax: History of 3 prior pneumonthoracies.  COPD (chronic obstructive pulmonary disease)  Asthma  Hypertension  Sarcoidosis  Central venous catheter placement with ultrasound guidance  Thoracoscopic pneumolysis  Thoracotomy, with lung lobectomy  Bronchoscopy with bronchoalveolar lavage of both sides  admitted with complaints of Patient is a 47y old  Male who presents with a chief complaint of "My leg wouldn't stop shaking." (14 Sep 2017 patient admitted with hemoptysis w/ Sarcoidosis, aspergilloma. Pt underwent a bronchoscopy, localizing bleeding to LULobe. Pt had a reoccurencve of hemopytysis. Now POD 24 from L thoracotomy DAVID lobectomy with RTOR x2 for hemothorax.  . Postoperative course/issues: L PTC placed by IR for PTX.  Pt has continuous air leak and is not tolerating waterseal trials.          PLAN  Neuro: Pain management  Pulm: Encourage coughing, deep breathing and use of incentive spirometry. Blood patch performed using 100cc of autologous blood. Consent obtained and an angiocath placed with aspiration of 100cc. Stop cock placed and under sterile conditions infuse in the pigtail catheter w/ sterile flush. Pt that was manipulated for 30min and then placed back to suction. Pt tolerated procedure well  Cardio: Monitor telemetry/alarms  GI: Tolerating diet. Continue stool softeners.  Renal: monitor urine output, supplement electrolytes as needed  Vasc: Heparin SC/SCDs for DVT prophylaxis  Heme: Stable H/H. .   ID: Off antibiotics. Stable.  Therapy: OOB/ambulate  Discussed with Cardiothoracic Team/ Rodrigue Caldera at AM rounds.

## 2017-10-19 NOTE — PROGRESS NOTE ADULT - SUBJECTIVE AND OBJECTIVE BOX
Patient is a 47y old  Male who presents with a chief complaint of "My leg wouldn't stop shaking." (14 Sep 2017 09:59)    doing ok  no SOB   s/p blood patch today   sir leak +  wheezing +  Any change in ROS:     MEDICATIONS  (STANDING):  ALBUTerol    90 MICROgram(s) HFA Inhaler 2 Puff(s) Inhalation every 4 hours  apixaban 5 milliGRAM(s) Oral every 12 hours  aspirin  chewable 81 milliGRAM(s) Oral daily  atorvastatin 80 milliGRAM(s) Oral at bedtime  buDESOnide 160 MICROgram(s)/formoterol 4.5 MICROgram(s) Inhaler 2 Puff(s) Inhalation every 12 hours  diVALproex  milliGRAM(s) Oral two times a day  docusate sodium 100 milliGRAM(s) Oral two times a day  ferrous    sulfate 325 milliGRAM(s) Oral every 8 hours  folic acid 1 milliGRAM(s) Oral daily  ipratropium 17 MICROgram(s) HFA Inhaler 2 Puff(s) Inhalation every 8 hours  pantoprazole    Tablet 40 milliGRAM(s) Oral two times a day before meals  polyethylene glycol 3350 17 Gram(s) Oral at bedtime  predniSONE   Tablet   Oral   voriconazole 200 milliGRAM(s) Oral every 12 hours    MEDICATIONS  (PRN):  acetaminophen    Suspension 650 milliGRAM(s) Oral every 6 hours PRN For Temp greater than 38 C (100.4 F)  acetaminophen   Tablet. 650 milliGRAM(s) Oral every 6 hours PRN Mild Pain (1 - 3)  aluminum hydroxide/magnesium hydroxide/simethicone Suspension 30 milliLiter(s) Oral every 6 hours PRN Dyspepsia  oxyCODONE    IR 5 milliGRAM(s) Oral every 4 hours PRN Moderate Pain (4 - 6)  oxyCODONE    IR 10 milliGRAM(s) Oral every 4 hours PRN Severe Pain (7 - 10)    Vital Signs Last 24 Hrs  T(C): 36.9 (19 Oct 2017 11:55), Max: 37 (18 Oct 2017 16:32)  T(F): 98.5 (19 Oct 2017 11:55), Max: 98.6 (18 Oct 2017 16:32)  HR: 77 (19 Oct 2017 11:55) (68 - 89)  BP: 115/70 (19 Oct 2017 11:55) (104/69 - 138/89)  BP(mean): --  RR: 18 (19 Oct 2017 11:55) (18 - 18)  SpO2: 100% (19 Oct 2017 11:55) (99% - 100%)    I&O's Summary    18 Oct 2017 07:01  -  19 Oct 2017 07:00  --------------------------------------------------------  IN: 0 mL / OUT: 471 mL / NET: -471 mL          Physical Exam:   GENERAL: NAD, well-groomed, well-developed  HEENT: GIOVANI/   Atraumatic, Normocephalic  ENMT: No tonsillar erythema, exudates, or enlargement; Moist mucous membranes, Good dentition, No lesions  NECK: Supple, No JVD, Normal thyroid  CHEST/LUNG: mild wheezing   CVS: Regular rate and rhythm; No murmurs, rubs, or gallops  GI: : Soft, Nontender, Nondistended; Bowel sounds present  NERVOUS SYSTEM:  Alert & Oriented X3  EXTREMITIES:  2+ Peripheral Pulses, No clubbing, cyanosis, or edema  LYMPH: No lymphadenopathy noted  SKIN: No rashes or lesions  ENDOCRINOLOGY: No Thyromegaly  PSYCH: Appropriate    Labs:                              10.3   8.41  )-----------( 74       ( 19 Oct 2017 05:44 )             32.6                         10.0   10.09 )-----------( 81       ( 18 Oct 2017 05:30 )             31.3                         11.2   12.58 )-----------( 91       ( 17 Oct 2017 07:00 )             35.8                         10.4   10.62 )-----------( 92       ( 16 Oct 2017 05:37 )             32.7     10-19    141  |  102  |  18  ----------------------------<  84  4.1   |  31  |  0.93  10-17    139  |  99  |  14  ----------------------------<  96  4.4   |  29  |  0.98    Ca    8.1<L>      19 Oct 2017 05:44      CAPILLARY BLOOD GLUCOSE                Cultures:                             Studies  Chest X-RAY  CT SCAN Chest   Venous Dopplers: LE:   CT Abdomen  Others      < from: Xray Chest 1 View AP- PORTABLE-Urgent (10.19.17 @ 12:59) >  INTERPRETATION:     The heart is not enlarged.  Bilateral lung opacities are not significantly changed.  A left pleural pigtail catheter is again seen.  There is a small to moderate size left hydropneumothorax which is larger   from October 18, 2017. Unable to accurately compare with October 9, 2017   at 7:02 AM due to overlying artifact. Left subcutaneous emphysema is   again seen.  Small right pleural effusion is unchanged.              IMPRESSION:  Small to moderate size left hydropneumothorax which is   larger from October 18, 2017.                  KEERTHI FIELDS M.D., ATTENDING RADIOLOGIST  This document has been electronically signed. Oct 19 2017  1:23PM    < end of copied text >

## 2017-10-19 NOTE — PROGRESS NOTE ADULT - PROBLEM SELECTOR PLAN 1
9/29: chest x-ray today , to me looks better then yesterday: There is no official report yet:  10/02: doing ok  10/3: on eliquis: toelrating well: no hemoptysis  10/4 : stable  10/05: persistent left upper air space following DAVID lobectomy: defer to thoracic surgery  10/06: stable?: no HEMOPTYSIS  10/7: resolved  10/8: cont prednisone: pneumothorax has resolved  10/09: resolved  10/10: no more hemoptysis  1/11: resolved after surgery and despite eliquis: stable  11/12: stable: s./p surgery: has mod pneumothorax: on the elft side: would defer to ct surgery: back on suction!  10/13: doing   ok  10/14: decrease dose to 30 mg for a few days and then reduce it to 20 mg: increase ptx today on chest radiograph: dw Dr Caldera  10/15: resolved: ptx on the left side is better today on the chest radiogrpah  10/16: small tiny left ptx: defer to thoracici  10/17: resolved  10/18: doing ok  10/19: no more hemoptysis

## 2017-10-19 NOTE — PROGRESS NOTE ADULT - PROBLEM SELECTOR PLAN 3
9/27; if he continues to show improvement: can decrease his solumedrol tomorrow  9/28: decreases his IV steroids to 20 mg once a day for a few days more  9/29: on 20 mg per day of solumedrol: would continue for few more days and then decrease it  9/30: doing ok: - wheezing: decrease his steroids to 10 mg a day from tomorrow  10/1: change to po prednisone 10 mg  10/02; cont po prednisone  10/3: stop prednisone after today,s dose?  10/6: OF PREDNISONE NOW  10/07: has sarcoidosis too: SOB with poor resp excursion: would start him on 20 mg of prednisone  10/8: cont prednisone with BD  10/09: his wheezing has increased today: Would change to IV METHYLPREDNISOLONE: 20 MG Q 8 HOURS  1/10was on 40 mg three times a day: dw icu attending , to decrease it to 20 q 8 hours  11/11: would change to po prednisone 40 mg daily  10/12: on steroids for now with slow taper  10/13: decrease prednisone to 30 mg tomorrow  10/14: prednisone 30 md every day : and then cut down to 20 mg  10/15: cont prednisone  10/16: on tapering steroids  10/17: no wheezing pt/ot  10/19: mild wheezing today : start duoneb ATC

## 2017-10-20 LAB
BUN SERPL-MCNC: 17 MG/DL — SIGNIFICANT CHANGE UP (ref 7–23)
CALCIUM SERPL-MCNC: 8.2 MG/DL — LOW (ref 8.4–10.5)
CHLORIDE SERPL-SCNC: 101 MMOL/L — SIGNIFICANT CHANGE UP (ref 98–107)
CO2 SERPL-SCNC: 29 MMOL/L — SIGNIFICANT CHANGE UP (ref 22–31)
CREAT SERPL-MCNC: 0.88 MG/DL — SIGNIFICANT CHANGE UP (ref 0.5–1.3)
GLUCOSE SERPL-MCNC: 85 MG/DL — SIGNIFICANT CHANGE UP (ref 70–99)
HCT VFR BLD CALC: 33.1 % — LOW (ref 39–50)
HGB BLD-MCNC: 10.3 G/DL — LOW (ref 13–17)
MCHC RBC-ENTMCNC: 31.1 % — LOW (ref 32–36)
MCHC RBC-ENTMCNC: 31.6 PG — SIGNIFICANT CHANGE UP (ref 27–34)
MCV RBC AUTO: 101.5 FL — HIGH (ref 80–100)
NRBC # FLD: 0 — SIGNIFICANT CHANGE UP
PLATELET # BLD AUTO: 77 K/UL — LOW (ref 150–400)
PMV BLD: 14.3 FL — HIGH (ref 7–13)
POTASSIUM SERPL-MCNC: 4.3 MMOL/L — SIGNIFICANT CHANGE UP (ref 3.5–5.3)
POTASSIUM SERPL-SCNC: 4.3 MMOL/L — SIGNIFICANT CHANGE UP (ref 3.5–5.3)
RBC # BLD: 3.26 M/UL — LOW (ref 4.2–5.8)
RBC # FLD: 17 % — HIGH (ref 10.3–14.5)
SODIUM SERPL-SCNC: 141 MMOL/L — SIGNIFICANT CHANGE UP (ref 135–145)
WBC # BLD: 9.92 K/UL — SIGNIFICANT CHANGE UP (ref 3.8–10.5)
WBC # FLD AUTO: 9.92 K/UL — SIGNIFICANT CHANGE UP (ref 3.8–10.5)

## 2017-10-20 PROCEDURE — 71010: CPT | Mod: 26

## 2017-10-20 RX ORDER — ALPRAZOLAM 0.25 MG
0.25 TABLET ORAL ONCE
Qty: 0 | Refills: 0 | Status: DISCONTINUED | OUTPATIENT
Start: 2017-10-20 | End: 2017-10-20

## 2017-10-20 RX ORDER — IPRATROPIUM/ALBUTEROL SULFATE 18-103MCG
3 AEROSOL WITH ADAPTER (GRAM) INHALATION EVERY 6 HOURS
Qty: 0 | Refills: 0 | Status: DISCONTINUED | OUTPATIENT
Start: 2017-10-20 | End: 2017-10-24

## 2017-10-20 RX ORDER — TRAZODONE HCL 50 MG
50 TABLET ORAL AT BEDTIME
Qty: 0 | Refills: 0 | Status: DISCONTINUED | OUTPATIENT
Start: 2017-10-20 | End: 2017-10-25

## 2017-10-20 RX ADMIN — Medication 81 MILLIGRAM(S): at 12:43

## 2017-10-20 RX ADMIN — ATORVASTATIN CALCIUM 80 MILLIGRAM(S): 80 TABLET, FILM COATED ORAL at 21:06

## 2017-10-20 RX ADMIN — APIXABAN 5 MILLIGRAM(S): 2.5 TABLET, FILM COATED ORAL at 05:27

## 2017-10-20 RX ADMIN — Medication 325 MILLIGRAM(S): at 21:06

## 2017-10-20 RX ADMIN — Medication 325 MILLIGRAM(S): at 05:27

## 2017-10-20 RX ADMIN — Medication 2 PUFF(S): at 09:46

## 2017-10-20 RX ADMIN — ALBUTEROL 2.5 MILLIGRAM(S): 90 AEROSOL, METERED ORAL at 09:22

## 2017-10-20 RX ADMIN — BUDESONIDE AND FORMOTEROL FUMARATE DIHYDRATE 2 PUFF(S): 160; 4.5 AEROSOL RESPIRATORY (INHALATION) at 09:44

## 2017-10-20 RX ADMIN — VORICONAZOLE 200 MILLIGRAM(S): 10 INJECTION, POWDER, LYOPHILIZED, FOR SOLUTION INTRAVENOUS at 17:11

## 2017-10-20 RX ADMIN — PANTOPRAZOLE SODIUM 40 MILLIGRAM(S): 20 TABLET, DELAYED RELEASE ORAL at 17:11

## 2017-10-20 RX ADMIN — Medication 3 MILLILITER(S): at 16:18

## 2017-10-20 RX ADMIN — Medication 0.25 MILLIGRAM(S): at 09:43

## 2017-10-20 RX ADMIN — VORICONAZOLE 200 MILLIGRAM(S): 10 INJECTION, POWDER, LYOPHILIZED, FOR SOLUTION INTRAVENOUS at 05:27

## 2017-10-20 RX ADMIN — OXYCODONE HYDROCHLORIDE 5 MILLIGRAM(S): 5 TABLET ORAL at 23:10

## 2017-10-20 RX ADMIN — OXYCODONE HYDROCHLORIDE 5 MILLIGRAM(S): 5 TABLET ORAL at 22:04

## 2017-10-20 RX ADMIN — APIXABAN 5 MILLIGRAM(S): 2.5 TABLET, FILM COATED ORAL at 17:11

## 2017-10-20 RX ADMIN — Medication 20 MILLIGRAM(S): at 05:27

## 2017-10-20 RX ADMIN — BUDESONIDE AND FORMOTEROL FUMARATE DIHYDRATE 2 PUFF(S): 160; 4.5 AEROSOL RESPIRATORY (INHALATION) at 21:06

## 2017-10-20 RX ADMIN — Medication 2 PUFF(S): at 17:11

## 2017-10-20 RX ADMIN — PANTOPRAZOLE SODIUM 40 MILLIGRAM(S): 20 TABLET, DELAYED RELEASE ORAL at 05:27

## 2017-10-20 RX ADMIN — DIVALPROEX SODIUM 500 MILLIGRAM(S): 500 TABLET, DELAYED RELEASE ORAL at 05:27

## 2017-10-20 RX ADMIN — Medication 650 MILLIGRAM(S): at 01:08

## 2017-10-20 RX ADMIN — Medication 1 MILLIGRAM(S): at 12:43

## 2017-10-20 RX ADMIN — Medication 100 MILLIGRAM(S): at 05:27

## 2017-10-20 RX ADMIN — Medication 2 PUFF(S): at 00:57

## 2017-10-20 RX ADMIN — Medication 50 MILLIGRAM(S): at 21:06

## 2017-10-20 RX ADMIN — Medication 3 MILLILITER(S): at 22:34

## 2017-10-20 RX ADMIN — DIVALPROEX SODIUM 500 MILLIGRAM(S): 500 TABLET, DELAYED RELEASE ORAL at 17:11

## 2017-10-20 RX ADMIN — Medication 650 MILLIGRAM(S): at 02:20

## 2017-10-20 RX ADMIN — Medication 325 MILLIGRAM(S): at 12:44

## 2017-10-20 NOTE — PROGRESS NOTE ADULT - PROBLEM SELECTOR PLAN 1
9/29: chest x-ray today , to me looks better then yesterday: There is no official report yet:  10/02: doing ok  10/3: on eliquis: toelrating well: no hemoptysis  10/4 : stable  10/05: persistent left upper air space following DAVID lobectomy: defer to thoracic surgery  10/06: stable?: no HEMOPTYSIS  10/7: resolved  10/8: cont prednisone: pneumothorax has resolved  10/09: resolved  10/10: no more hemoptysis  1/11: resolved after surgery and despite eliquis: stable  11/12: stable: s./p surgery: has mod pneumothorax: on the elft side: would defer to ct surgery: back on suction!  10/13: doing   ok  10/14: decrease dose to 30 mg for a few days and then reduce it to 20 mg: increase ptx today on chest radiograph: dw Dr Caldera  10/15: resolved: ptx on the left side is better today on the chest radiogrpah  10/16: small tiny left ptx: defer to thoracici  10/17: resolved  10/20: no more hemoptysis  10/18: doing ok  10/19: no more hemoptysis

## 2017-10-20 NOTE — PROGRESS NOTE ADULT - SUBJECTIVE AND OBJECTIVE BOX
Subjective:    Vital Signs:  Vital Signs Last 24 Hrs  T(C): 36.8 (10-20-17 @ 12:41), Max: 36.9 (10-19-17 @ 17:26)  T(F): 98.2 (10-20-17 @ 12:41), Max: 98.5 (10-19-17 @ 17:26)  HR: 94 (10-20-17 @ 12:41) (72 - 96)  BP: 130/74 (10-20-17 @ 12:41) (115/55 - 136/87)  RR: 20 (10-20-17 @ 12:41) (18 - 20)  SpO2: 100% (10-20-17 @ 12:41) (99% - 100%) on (O2)    Telemetry/Alarms:  General: WN/WD NAD  Neurology: Awake, nonfocal, BETH x 4  Eyes: Scleras clear, PERRLA/ EOMI, Gross vision intact  ENT:Gross hearing intact, grossly patent pharynx, no stridor  Neck: Neck supple, trachea midline, No JVD,   Respiratory: CTA B/L, No wheezing, rales, rhonchi  CV: RRR, S1S2, no murmurs, rubs or gallops  Abdominal: Soft, NT, ND +BS,   Extremities: No edema, + peripheral pulses  Skin: No Rashes, Hematoma, Ecchymosis  Lymphatic: No Neck, axilla, groin LAD  Psych: Oriented x 3, normal affect  Incisions:   Tubes:  Relevant labs, radiology and Medications reviewed                        10.3   9.92  )-----------( 77       ( 20 Oct 2017 05:45 )             33.1     10-20    141  |  101  |  17  ----------------------------<  85  4.3   |  29  |  0.88    Ca    8.2<L>      20 Oct 2017 05:45        MEDICATIONS  (STANDING):  ALBUTerol    0.083% 2.5 milliGRAM(s) Nebulizer two times a day  apixaban 5 milliGRAM(s) Oral every 12 hours  aspirin  chewable 81 milliGRAM(s) Oral daily  atorvastatin 80 milliGRAM(s) Oral at bedtime  buDESOnide 160 MICROgram(s)/formoterol 4.5 MICROgram(s) Inhaler 2 Puff(s) Inhalation every 12 hours  diVALproex  milliGRAM(s) Oral two times a day  docusate sodium 100 milliGRAM(s) Oral two times a day  ferrous    sulfate 325 milliGRAM(s) Oral every 8 hours  folic acid 1 milliGRAM(s) Oral daily  ipratropium 17 MICROgram(s) HFA Inhaler 2 Puff(s) Inhalation every 8 hours  pantoprazole    Tablet 40 milliGRAM(s) Oral two times a day before meals  polyethylene glycol 3350 17 Gram(s) Oral at bedtime  predniSONE   Tablet   Oral   predniSONE   Tablet 20 milliGRAM(s) Oral daily  voriconazole 200 milliGRAM(s) Oral every 12 hours    MEDICATIONS  (PRN):  acetaminophen    Suspension 650 milliGRAM(s) Oral every 6 hours PRN For Temp greater than 38 C (100.4 F)  acetaminophen   Tablet. 650 milliGRAM(s) Oral every 6 hours PRN Mild Pain (1 - 3)  aluminum hydroxide/magnesium hydroxide/simethicone Suspension 30 milliLiter(s) Oral every 6 hours PRN Dyspepsia  oxyCODONE    IR 5 milliGRAM(s) Oral every 4 hours PRN Moderate Pain (4 - 6)  oxyCODONE    IR 10 milliGRAM(s) Oral every 4 hours PRN Severe Pain (7 - 10)    Pertinent Physical Exam  I&O's Summary    19 Oct 2017 07:01  -  20 Oct 2017 07:00  --------------------------------------------------------  IN: 0 mL / OUT: 14 mL / NET: -14 mL        Assessment  47y Male  w/ PAST MEDICAL & SURGICAL HISTORY:  History of pneumothorax: History of 3 prior pneumonthoracies.  COPD (chronic obstructive pulmonary disease)  Asthma  Hypertension  Sarcoidosis  No significant past surgical history  admitted with complaints of Patient is a 47y old  Male who presents with a chief complaint of "My leg wouldn't stop shaking." (14 Sep 2017 09:59)  .  On (Date), patient underwent Thoracotomy  Central venous catheter placement with ultrasound guidance  Thoracoscopic pneumolysis  Thoracotomy, with lung lobectomy  Bronchoscopy with bronchoalveolar lavage of both sides  . Postoperative course/issues:    PLAN  Neuro: Pain management  Pulm: Encourage coughing, deep breathing and use of incentive spirometry. Wean off supplemental oxygen as able. Daily CXR.   Cardio: Monitor telemetry/alarms  GI: Tolerating diet. Continue stool softeners.  Renal: monitor urine output, supplement electrolytes as needed  Vasc: Heparin SC/SCDs for DVT prophylaxis  Heme: Stable H/H. .   ID: Off antibiotics. Stable.  Therapy: OOB/ambulate  Tubes: Monitor Chest tube output  Disposition: Aim to D/C to home on  Discussed with Cardiothoracic Team at AM rounds. Subjective:    Vital Signs: Hemodynamically stable and afebrile  Vital Signs Last 24 Hrs  T(C): 36.8 (10-20-17 @ 12:41), Max: 36.9 (10-19-17 @ 17:26)  T(F): 98.2 (10-20-17 @ 12:41), Max: 98.5 (10-19-17 @ 17:26)  HR: 94 (10-20-17 @ 12:41) (72 - 96)  BP: 130/74 (10-20-17 @ 12:41) (115/55 - 136/87)  RR: 20 (10-20-17 @ 12:41) (18 - 20)  SpO2: 100% (10-20-17 @ 12:41) (99% - 100%) on 21%O2    Telemetry/Alarms: NSR  General: WN/WD NAD  Neurology: Awake, nonfocal, BETH x 4  Eyes: Scleras clear, PERRLA/ EOMI, Gross vision intact  ENT:Gross hearing intact, grossly patent pharynx, no stridor  Neck: Neck supple, trachea midline, No JVD,   Respiratory: Decreased Lt - CTA-> Rt  B/L, Scattered  rhonchi  CV: RRR, S1S2, no murmurs, rubs or gallops  Abdominal: Soft, NT, ND +BS,   Extremities: No edema, + peripheral pulses, no calf pain or tenderness  Skin: No Rashes, Hematoma, Ecchymosis  Lymphatic: No Neck, axilla, groin LAD  Psych: Oriented x 3, normal affect  Incisions: Mo s-s of infection   Tubes: Lt PTC-> suction, + air leak    Relevant labs, radiology->Small left pneumothorax slightly decreased in size. Small left pleural effusion. and Medications reviewed                            10.3   9.92  )-----------( 77       ( 20 Oct 2017 05:45 )             33.1     10-20    141  |  101  |  17  ----------------------------<  85  4.3   |  29  |  0.88    Ca    8.2<L>      20 Oct 2017 05:45        MEDICATIONS  (STANDING):  ALBUTerol    0.083% 2.5 milliGRAM(s) Nebulizer two times a day  apixaban 5 milliGRAM(s) Oral every 12 hours  aspirin  chewable 81 milliGRAM(s) Oral daily  atorvastatin 80 milliGRAM(s) Oral at bedtime  buDESOnide 160 MICROgram(s)/formoterol 4.5 MICROgram(s) Inhaler 2 Puff(s) Inhalation every 12 hours  diVALproex  milliGRAM(s) Oral two times a day  docusate sodium 100 milliGRAM(s) Oral two times a day  ferrous    sulfate 325 milliGRAM(s) Oral every 8 hours  folic acid 1 milliGRAM(s) Oral daily  ipratropium 17 MICROgram(s) HFA Inhaler 2 Puff(s) Inhalation every 8 hours  pantoprazole    Tablet 40 milliGRAM(s) Oral two times a day before meals  polyethylene glycol 3350 17 Gram(s) Oral at bedtime  predniSONE   Tablet   Oral   predniSONE   Tablet 20 milliGRAM(s) Oral daily  voriconazole 200 milliGRAM(s) Oral every 12 hours    MEDICATIONS  (PRN):  acetaminophen    Suspension 650 milliGRAM(s) Oral every 6 hours PRN For Temp greater than 38 C (100.4 F)  acetaminophen   Tablet. 650 milliGRAM(s) Oral every 6 hours PRN Mild Pain (1 - 3)  aluminum hydroxide/magnesium hydroxide/simethicone Suspension 30 milliLiter(s) Oral every 6 hours PRN Dyspepsia  oxyCODONE    IR 5 milliGRAM(s) Oral every 4 hours PRN Moderate Pain (4 - 6)  oxyCODONE    IR 10 milliGRAM(s) Oral every 4 hours PRN Severe Pain (7 - 10)    Pertinent Physical Exam  I&O's Summary    19 Oct 2017 07:01  -  20 Oct 2017 07:00  --------------------------------------------------------  IN: 0 mL / OUT: 14 mL / NET: -14 mL        Assessment  47y Male  w/ PAST MEDICAL & SURGICAL HISTORY:  History of pneumothorax: History of 3 prior pneumonthoracies.  COPD (chronic obstructive pulmonary disease)  Asthma  Hypertension  Sarcoidosis  No significant past surgical history  admitted with complaints of Patient is a 47y old  Male who presents with a chief complaint of "My leg wouldn't stop shaking." (14 Sep 2017 09:59)  .  On 9/25-24-21, patient underwent Thoracotomy  Central venous catheter placement with ultrasound guidance  Thoracoscopic pneumolysis  Thoracotomy, with lung lobectomy  Bronchoscopy with bronchoalveolar lavage of both sides  . Postoperative course/issues:    PLAN  Neuro: Pain management  Pulm: Encourage coughing, deep breathing and use of incentive spirometry. Wean off supplemental oxygen as able. Daily CXR.   Cardio: Monitor telemetry/alarms  GI: Tolerating diet. Continue stool softeners.  Renal: monitor urine output, supplement electrolytes as needed  Vasc: Heparin SC/SCDs for DVT prophylaxis  Heme: Stable H/H. .   ID: Off antibiotics. Stable.  Therapy: OOB/ambulate  Tubes: Monitor Chest tube output  Disposition: Aim to D/C to home on  Discussed with Cardiothoracic Team at AM rounds. Subjective:    Vital Signs: Hemodynamically stable and afebrile  Vital Signs Last 24 Hrs  T(C): 36.8 (10-20-17 @ 12:41), Max: 36.9 (10-19-17 @ 17:26)  T(F): 98.2 (10-20-17 @ 12:41), Max: 98.5 (10-19-17 @ 17:26)  HR: 94 (10-20-17 @ 12:41) (72 - 96)  BP: 130/74 (10-20-17 @ 12:41) (115/55 - 136/87)  RR: 20 (10-20-17 @ 12:41) (18 - 20)  SpO2: 100% (10-20-17 @ 12:41) (99% - 100%) on 21%O2    Telemetry/Alarms: NSR  General: WN/WD NAD  Neurology: Awake, nonfocal, BETH x 4  Eyes: Scleras clear, PERRLA/ EOMI, Gross vision intact  ENT:Gross hearing intact, grossly patent pharynx, no stridor  Neck: Neck supple, trachea midline, No JVD,   Respiratory: Decreased Lt - CTA-> Rt  B/L, Scattered  rhonchi  CV: RRR, S1S2, no murmurs, rubs or gallops  Abdominal: Soft, NT, ND +BS,   Extremities: Lt>Rt edema, + peripheral pulses, no calf pain or tenderness  Skin: No Rashes, Hematoma, Ecchymosis  Lymphatic: No Neck, axilla, groin LAD  Psych: Oriented x 3, normal affect  Incisions: Mo s-s of infection   Tubes: Lt PTC-> suction, + air leak    Relevant labs, radiology->Small left pneumothorax slightly decreased in size. Small left pleural effusion. and Medications reviewed                            10.3   9.92  )-----------( 77       ( 20 Oct 2017 05:45 )             33.1     10-20    141  |  101  |  17  ----------------------------<  85  4.3   |  29  |  0.88    Ca    8.2<L>      20 Oct 2017 05:45        MEDICATIONS  (STANDING):  ALBUTerol    0.083% 2.5 milliGRAM(s) Nebulizer two times a day  apixaban 5 milliGRAM(s) Oral every 12 hours  aspirin  chewable 81 milliGRAM(s) Oral daily  atorvastatin 80 milliGRAM(s) Oral at bedtime  buDESOnide 160 MICROgram(s)/formoterol 4.5 MICROgram(s) Inhaler 2 Puff(s) Inhalation every 12 hours  diVALproex  milliGRAM(s) Oral two times a day  docusate sodium 100 milliGRAM(s) Oral two times a day  ferrous    sulfate 325 milliGRAM(s) Oral every 8 hours  folic acid 1 milliGRAM(s) Oral daily  ipratropium 17 MICROgram(s) HFA Inhaler 2 Puff(s) Inhalation every 8 hours  pantoprazole    Tablet 40 milliGRAM(s) Oral two times a day before meals  polyethylene glycol 3350 17 Gram(s) Oral at bedtime  predniSONE   Tablet   Oral   predniSONE   Tablet 20 milliGRAM(s) Oral daily  voriconazole 200 milliGRAM(s) Oral every 12 hours    MEDICATIONS  (PRN):  acetaminophen    Suspension 650 milliGRAM(s) Oral every 6 hours PRN For Temp greater than 38 C (100.4 F)  acetaminophen   Tablet. 650 milliGRAM(s) Oral every 6 hours PRN Mild Pain (1 - 3)  aluminum hydroxide/magnesium hydroxide/simethicone Suspension 30 milliLiter(s) Oral every 6 hours PRN Dyspepsia  oxyCODONE    IR 5 milliGRAM(s) Oral every 4 hours PRN Moderate Pain (4 - 6)  oxyCODONE    IR 10 milliGRAM(s) Oral every 4 hours PRN Severe Pain (7 - 10)    Pertinent Physical Exam  I&O's Summary    19 Oct 2017 07:01  -  20 Oct 2017 07:00  --------------------------------------------------------  IN: 0 mL / OUT: 14 mL / NET: -14 mL        Assessment  47y Male  w/ PAST MEDICAL & SURGICAL HISTORY:  History of pneumothorax: History of 3 prior pneumonthoracies.  COPD (chronic obstructive pulmonary disease)  Asthma  Hypertension  Sarcoidosis  No significant past surgical history  admitted with complaints of Patient is a 47y old  Male who presents with a chief complaint of "My leg wouldn't stop shaking." (14 Sep 2017 09:59)  .  On 9/25-24-21, patient underwent Thoracotomy  Central venous catheter placement with ultrasound guidance  Thoracoscopic pneumolysis  Thoracotomy, with lung lobectomy  Bronchoscopy with bronchoalveolar lavage of both sides  Postoperative course/issues:9/22 CT angio done per IR Elkin request.  Formal PFTS and ask Card to fix PFO now to decrease blood flow; Pt on thoracic service now   to [facility]ICU & OR on 9/24; 9/24: 1400ml EBL in OR; 10 u PRBCs; CTI- DAVID space despite -40 Sxn, MEYER, L Hemiparesis (CVA) better w walking; F/u ID- Voriconazole to 10/24; Plan acute rehab 10/6 IR L PTC for PTX; 10/13 WS today if CXR ok -- incr PTX --> back to suction at all times 10/14: sutured leaking old chest tube site. Keep ct to suction. 10/0 Uneventful PTC remains on suction with air leak, 10/16 Failed WS trail, returned to suction. 10/17 Remains on suction. 10/18 Increase PTX on WS returned to suction. 10/19 Blood patch procedure done. 10/20 Air leak now only with cough.        PLAN  Neuro: Pain management  Pulm: Encourage coughing, deep breathing and use of incentive spirometry. Wean off supplemental oxygen as able.   Daily CXR.   Cardio: Monitor telemetry/alarms  GI: Tolerating diet. Continue stool softeners.  Renal: monitor urine output, supplement electrolytes as needed  Vasc: Heparin SC/SCDs for DVT prophylaxis  Heme: Stable H/H. .   ID: Off antibiotics. Stable.  Therapy: OOB/ambulate  Tubes: Monitor Chest tube output,  cont chest tube to suction->10/21 change to WS  Disposition: Aim to D/C to rehab as per Dr Caldera  Discussed with Cardiothoracic Team at AM rounds.

## 2017-10-20 NOTE — PROGRESS NOTE ADULT - PROBLEM SELECTOR PLAN 3
9/29: on 20 mg per day of solumedrol: would continue for few more days and then decrease it  9/30: doing ok: - wheezing: decrease his steroids to 10 mg a day from tomorrow  10/1: change to po prednisone 10 mg  10/02; cont po prednisone  10/3: stop prednisone after today,s dose?  10/6: OF PREDNISONE NOW  10/07: has sarcoidosis too: SOB with poor resp excursion: would start him on 20 mg of prednisone  10/8: cont prednisone with BD  10/09: his wheezing has increased today: Would change to IV METHYLPREDNISOLONE: 20 MG Q 8 HOURS  1/10was on 40 mg three times a day: dw icu attending , to decrease it to 20 q 8 hours  11/11: would change to po prednisone 40 mg daily  10/12: on steroids for now with slow taper  10/13: decrease prednisone to 30 mg tomorrow  10/14: prednisone 30 md every day : and then cut down to 20 mg  10/15: cont prednisone  10/16: on tapering steroids  10/17: no wheezing pt/ot  10/19: mild wheezing today : start duoneb ATC  10/20: give duoneb q 6 hours ATC

## 2017-10-20 NOTE — PROGRESS NOTE ADULT - PROBLEM SELECTOR PLAN 6
on steroids at this time: it was given only for a short duration: thinking his wheezing is secondary to rhinovirus infection: It will cont till tomorrow: thereafter would taper it down to off in next 2-3  10/1: change prednisone to 10 mg daily  10/02: cont prednisone  10/03: trop prednisone after today's dose  10/4 stop prednisone tomorrow  10/5: dc prednisone today : pt was not on any treatment for sarcoidosis before  10/06: OFF PREDNISONE NOW  10/7: start prednisone 20 mg per day  10/09: started back on IV steroids given his increased wheezing!!  10/10: wheezing better: decrease steroids to 20 mg q 8  10//: decrease and change prednisone to 40 mg daily: probably will be discharged on some prednisone  10/13: decrease prednisone to 30 mg tomorrow  10/15: on 30 mg of prednisone  10/16: on tapering steroids  10/17: stable: cont prednisone , will probably be dced on 20 mg of prednisone chan  10/17: stable   10/18: stable  10/19: cont steroids fro now  10/20: on 20 mg of prednisone for now

## 2017-10-20 NOTE — PROGRESS NOTE ADULT - SUBJECTIVE AND OBJECTIVE BOX
Patient is a 47y old  Male who presents with a chief complaint of "My leg wouldn't stop shaking." (14 Sep 2017 09:59)  still a leak on strong coughing  s/p blood patch yesterday   unable to cough out phlegm     Any change in ROS:     MEDICATIONS  (STANDING):  ALBUTerol    0.083% 2.5 milliGRAM(s) Nebulizer two times a day  apixaban 5 milliGRAM(s) Oral every 12 hours  aspirin  chewable 81 milliGRAM(s) Oral daily  atorvastatin 80 milliGRAM(s) Oral at bedtime  buDESOnide 160 MICROgram(s)/formoterol 4.5 MICROgram(s) Inhaler 2 Puff(s) Inhalation every 12 hours  diVALproex  milliGRAM(s) Oral two times a day  docusate sodium 100 milliGRAM(s) Oral two times a day  ferrous    sulfate 325 milliGRAM(s) Oral every 8 hours  folic acid 1 milliGRAM(s) Oral daily  ipratropium 17 MICROgram(s) HFA Inhaler 2 Puff(s) Inhalation every 8 hours  pantoprazole    Tablet 40 milliGRAM(s) Oral two times a day before meals  polyethylene glycol 3350 17 Gram(s) Oral at bedtime  predniSONE   Tablet   Oral   predniSONE   Tablet 20 milliGRAM(s) Oral daily  voriconazole 200 milliGRAM(s) Oral every 12 hours    MEDICATIONS  (PRN):  acetaminophen    Suspension 650 milliGRAM(s) Oral every 6 hours PRN For Temp greater than 38 C (100.4 F)  acetaminophen   Tablet. 650 milliGRAM(s) Oral every 6 hours PRN Mild Pain (1 - 3)  aluminum hydroxide/magnesium hydroxide/simethicone Suspension 30 milliLiter(s) Oral every 6 hours PRN Dyspepsia  oxyCODONE    IR 5 milliGRAM(s) Oral every 4 hours PRN Moderate Pain (4 - 6)  oxyCODONE    IR 10 milliGRAM(s) Oral every 4 hours PRN Severe Pain (7 - 10)    Vital Signs Last 24 Hrs  T(C): 36.8 (20 Oct 2017 12:41), Max: 36.9 (19 Oct 2017 17:26)  T(F): 98.2 (20 Oct 2017 12:41), Max: 98.5 (19 Oct 2017 17:26)  HR: 94 (20 Oct 2017 12:41) (72 - 96)  BP: 130/74 (20 Oct 2017 12:41) (115/55 - 136/87)  BP(mean): --  RR: 20 (20 Oct 2017 12:41) (18 - 20)  SpO2: 100% (20 Oct 2017 12:41) (99% - 100%)    I&O's Summary    19 Oct 2017 07:01  -  20 Oct 2017 07:00  --------------------------------------------------------  IN: 0 mL / OUT: 14 mL / NET: -14 mL          Physical Exam:   GENERAL: NAD, well-groomed, well-developed  HEENT: GIOVANI/   Atraumatic, Normocephalic  ENMT: No tonsillar erythema, exudates, or enlargement; Moist mucous membranes, Good dentition, No lesions  NECK: Supple, No JVD, Normal thyroid  CHEST/LUNG: coarse rhonchi bilaterally   CVS: Regular rate and rhythm; No murmurs, rubs, or gallops  GI: : Soft, Nontender, Nondistended; Bowel sounds present  NERVOUS SYSTEM:  Alert & Oriented X3  EXTREMITIES:  some ankle  edema  LYMPH: No lymphadenopathy noted  SKIN: No rashes or lesions  ENDOCRINOLOGY: No Thyromegaly  PSYCH: Appropriate    Labs:                              10.3   9.92  )-----------( 77       ( 20 Oct 2017 05:45 )             33.1                         10.3   8.41  )-----------( 74       ( 19 Oct 2017 05:44 )             32.6                         10.0   10.09 )-----------( 81       ( 18 Oct 2017 05:30 )             31.3                         11.2   12.58 )-----------( 91       ( 17 Oct 2017 07:00 )             35.8     10-20    141  |  101  |  17  ----------------------------<  85  4.3   |  29  |  0.88  10-19    141  |  102  |  18  ----------------------------<  84  4.1   |  31  |  0.93  10-17    139  |  99  |  14  ----------------------------<  96  4.4   |  29  |  0.98    Ca    8.2<L>      20 Oct 2017 05:45  Ca    8.1<L>      19 Oct 2017 05:44      CAPILLARY BLOOD GLUCOSE                Cultures:         < from: Xray Chest 1 View AP -PORTABLE-Routine (10.20.17 @ 07:52) >  PROCEDURE DATE:  Oct 20 2017         INTERPRETATION:  CLINICAL INDICATION: Pneumothorax.    TECHNIQUE: Single AP view of the chest was obtained on 10/20/2017 at 7:23   AM.    COMPARISON: 10/19/2017 at 7:23 AM.    FINDINGS: There is a left-sided chest tube. Sutures overlie the left   lung. Small left pneumothorax is slightly decreased in size. Again noted   are bilateral regions of architectural distortion and cystic change.   There is a small left pleural effusion. The heart size is normal.    IMPRESSION: Small left pneumothorax slightly decreased in size. Small   left pleural effusion.                    ALYSSA CHIRINOS M.D., RADIOLOGY RESIDENT  This document has been electronically signed.  JULIETTE SMITH M.D., ATTENDING RADIOLOGIST  This document has been electronically signed. Oct 20 2017 11:45AM        < end of copied text >                      Studies  Chest X-RAY  CT SCAN Chest   Venous Dopplers: LE:   CT Abdomen  Others

## 2017-10-21 DIAGNOSIS — D69.1 QUALITATIVE PLATELET DEFECTS: ICD-10-CM

## 2017-10-21 DIAGNOSIS — J93.9 PNEUMOTHORAX, UNSPECIFIED: ICD-10-CM

## 2017-10-21 PROCEDURE — 71010: CPT | Mod: 26,76

## 2017-10-21 RX ORDER — TEMAZEPAM 15 MG/1
15 CAPSULE ORAL AT BEDTIME
Qty: 0 | Refills: 0 | Status: DISCONTINUED | OUTPATIENT
Start: 2017-10-21 | End: 2017-10-21

## 2017-10-21 RX ADMIN — PANTOPRAZOLE SODIUM 40 MILLIGRAM(S): 20 TABLET, DELAYED RELEASE ORAL at 06:40

## 2017-10-21 RX ADMIN — Medication 3 MILLILITER(S): at 21:47

## 2017-10-21 RX ADMIN — PANTOPRAZOLE SODIUM 40 MILLIGRAM(S): 20 TABLET, DELAYED RELEASE ORAL at 17:03

## 2017-10-21 RX ADMIN — Medication 3 MILLILITER(S): at 16:31

## 2017-10-21 RX ADMIN — DIVALPROEX SODIUM 500 MILLIGRAM(S): 500 TABLET, DELAYED RELEASE ORAL at 17:03

## 2017-10-21 RX ADMIN — Medication 1 MILLIGRAM(S): at 12:37

## 2017-10-21 RX ADMIN — DIVALPROEX SODIUM 500 MILLIGRAM(S): 500 TABLET, DELAYED RELEASE ORAL at 06:40

## 2017-10-21 RX ADMIN — Medication 50 MILLIGRAM(S): at 21:16

## 2017-10-21 RX ADMIN — APIXABAN 5 MILLIGRAM(S): 2.5 TABLET, FILM COATED ORAL at 06:40

## 2017-10-21 RX ADMIN — ATORVASTATIN CALCIUM 80 MILLIGRAM(S): 80 TABLET, FILM COATED ORAL at 21:16

## 2017-10-21 RX ADMIN — Medication 20 MILLIGRAM(S): at 06:40

## 2017-10-21 RX ADMIN — Medication 325 MILLIGRAM(S): at 15:21

## 2017-10-21 RX ADMIN — BUDESONIDE AND FORMOTEROL FUMARATE DIHYDRATE 2 PUFF(S): 160; 4.5 AEROSOL RESPIRATORY (INHALATION) at 21:16

## 2017-10-21 RX ADMIN — Medication 325 MILLIGRAM(S): at 21:16

## 2017-10-21 RX ADMIN — Medication 3 MILLILITER(S): at 10:58

## 2017-10-21 RX ADMIN — OXYCODONE HYDROCHLORIDE 10 MILLIGRAM(S): 5 TABLET ORAL at 21:18

## 2017-10-21 RX ADMIN — Medication 81 MILLIGRAM(S): at 12:37

## 2017-10-21 RX ADMIN — TEMAZEPAM 15 MILLIGRAM(S): 15 CAPSULE ORAL at 23:26

## 2017-10-21 RX ADMIN — VORICONAZOLE 200 MILLIGRAM(S): 10 INJECTION, POWDER, LYOPHILIZED, FOR SOLUTION INTRAVENOUS at 06:40

## 2017-10-21 RX ADMIN — VORICONAZOLE 200 MILLIGRAM(S): 10 INJECTION, POWDER, LYOPHILIZED, FOR SOLUTION INTRAVENOUS at 17:03

## 2017-10-21 RX ADMIN — Medication 325 MILLIGRAM(S): at 06:40

## 2017-10-21 RX ADMIN — APIXABAN 5 MILLIGRAM(S): 2.5 TABLET, FILM COATED ORAL at 17:04

## 2017-10-21 RX ADMIN — BUDESONIDE AND FORMOTEROL FUMARATE DIHYDRATE 2 PUFF(S): 160; 4.5 AEROSOL RESPIRATORY (INHALATION) at 10:59

## 2017-10-21 RX ADMIN — OXYCODONE HYDROCHLORIDE 10 MILLIGRAM(S): 5 TABLET ORAL at 20:25

## 2017-10-21 NOTE — PROGRESS NOTE ADULT - PROBLEM SELECTOR PLAN 4
9/29: on 20 mg per day of solumedrol: would continue for few more days and then decrease it  9/30: doing ok: - wheezing: decrease his steroids to 10 mg a day from tomorrow  10/1: change to po prednisone 10 mg  10/02; cont po prednisone  10/3: stop prednisone after today,s dose?  10/6: OF PREDNISONE NOW  10/07: has sarcoidosis too: SOB with poor resp excursion: would start him on 20 mg of prednisone  10/8: cont prednisone with BD  10/09: his wheezing has increased today: Would change to IV METHYLPREDNISOLONE: 20 MG Q 8 HOURS  1/10was on 40 mg three times a day: dw icu attending , to decrease it to 20 q 8 hours  11/11: would change to po prednisone 40 mg daily  10/12: on steroids for now with slow taper  10/13: decrease prednisone to 30 mg tomorrow  10/14: prednisone 30 md every day : and then cut down to 20 mg  10/15: cont prednisone  10/16: on tapering steroids  10/17: no wheezing pt/ot  10/19: mild wheezing today : start duoneb ATC  10/20: give duoneb q 6 hours ATC  10/21: still with off and on wheezing: cont 20 mg of steroids

## 2017-10-21 NOTE — PROGRESS NOTE ADULT - SUBJECTIVE AND OBJECTIVE BOX
Subjective: "Im OK, but I am having insomnia"  No events overnight    This am no air leak was present and the patients pleural pigtail drain was placed to water seal with intention of obtaining a f/u CXR at in afternoon. Within 1 hour he developed shortness of breath and required oxygen. His chest tube was placed back on suction and an air leak was seen in pleuravac. His shortness of breath and oxygen requirement resolved. A F/U CXR was ordered.     His Left Lower extremity appears swollen this am.     Vital Signs Last 24 Hrs  T(C): 36.6 (10-21-17 @ 05:50), Max: 37 (10-20-17 @ 15:58)  T(F): 97.9 (10-21-17 @ 05:50), Max: 98.6 (10-20-17 @ 15:58)  HR: 74 (10-21-17 @ 11:03) (73 - 94)  BP: 130/92 (10-21-17 @ 08:30) (112/70 - 130/92)  RR: 20 (10-21-17 @ 08:30) (18 - 20)  SpO2: 97% (10-21-17 @ 11:03) (96% - 100%) on (O2)    Telemetry/Alarms:  General: WN/WD NAD  Neurology: Awake, nonfocal, BETH x 4  Eyes: Scleras clear, PERRLA/ EOMI, Gross vision intact  ENT:Gross hearing intact, grossly patent pharynx, no stridor  Neck: Neck supple, trachea midline, No JVD,   Respiratory: CTA B/L, No wheezing, rales, rhonchi  CV: RRR, S1S2, no murmurs, rubs or gallops  Abdominal: Soft, NT, ND +BS,   Extremities: No edema, + peripheral pulses  Skin: No Rashes, Hematoma, Ecchymosis  Lymphatic: No Neck, axilla, groin LAD  Psych: Oriented x 3, normal affect  Incisions:   Tubes:  Relevant labs, radiology and Medications reviewed                        10.3   9.92  )-----------( 77       ( 20 Oct 2017 05:45 )             33.1     10-20    141  |  101  |  17  ----------------------------<  85  4.3   |  29  |  0.88    Ca    8.2<L>      20 Oct 2017 05:45        MEDICATIONS  (STANDING):  ALBUTerol/ipratropium for Nebulization 3 milliLiter(s) Nebulizer every 6 hours  apixaban 5 milliGRAM(s) Oral every 12 hours  aspirin  chewable 81 milliGRAM(s) Oral daily  atorvastatin 80 milliGRAM(s) Oral at bedtime  buDESOnide 160 MICROgram(s)/formoterol 4.5 MICROgram(s) Inhaler 2 Puff(s) Inhalation every 12 hours  diVALproex  milliGRAM(s) Oral two times a day  docusate sodium 100 milliGRAM(s) Oral two times a day  ferrous    sulfate 325 milliGRAM(s) Oral every 8 hours  folic acid 1 milliGRAM(s) Oral daily  pantoprazole    Tablet 40 milliGRAM(s) Oral two times a day before meals  polyethylene glycol 3350 17 Gram(s) Oral at bedtime  predniSONE   Tablet   Oral   predniSONE   Tablet 20 milliGRAM(s) Oral daily  traZODone 50 milliGRAM(s) Oral at bedtime  voriconazole 200 milliGRAM(s) Oral every 12 hours    MEDICATIONS  (PRN):  acetaminophen    Suspension 650 milliGRAM(s) Oral every 6 hours PRN For Temp greater than 38 C (100.4 F)  acetaminophen   Tablet. 650 milliGRAM(s) Oral every 6 hours PRN Mild Pain (1 - 3)  aluminum hydroxide/magnesium hydroxide/simethicone Suspension 30 milliLiter(s) Oral every 6 hours PRN Dyspepsia  oxyCODONE    IR 5 milliGRAM(s) Oral every 4 hours PRN Moderate Pain (4 - 6)  oxyCODONE    IR 10 milliGRAM(s) Oral every 4 hours PRN Severe Pain (7 - 10)  temazepam 15 milliGRAM(s) Oral at bedtime PRN Insomnia    Pertinent Physical Exam  I&O's Summary    20 Oct 2017 07:01  -  21 Oct 2017 07:00  --------------------------------------------------------  IN: 0 mL / OUT: 40 mL / NET: -40 mL        Assessment  47y Male  w/ PAST MEDICAL & SURGICAL HISTORY:  History of pneumothorax: History of 3 prior pneumonthoracies.  COPD (chronic obstructive pulmonary disease)  Asthma  Hypertension  Sarcoidosis  No significant past surgical history  admitted with complaints of Patient is a 47y old  Male who presents with a chief complaint of "My leg wouldn't stop shaking." (14 Sep 2017 09:59)  .  On 9/24/17, patient underwent Thoracotomy  Central venous catheter placement with ultrasound guidance  Thoracoscopic pneumolysis  Thoracotomy, with lung lobectomy  Bronchoscopy with bronchoalveolar lavage of both sides  .  Issues: Persistent post operative airleak, LLE swelling, Insomnia    PLAN  General: Insomnia. Will add Restoril prn at bedtime.   Neuro: Pain management  Pulm: Encourage coughing, deep breathing and use of incentive spirometry. Wean off supplemental oxygen as able. Daily CXR.   Cardio: Monitor telemetry/alarms  GI: Tolerating diet. Continue stool softeners.  Renal: monitor urine output, supplement electrolytes as needed  Vasc: Heparin SC/SCDs for DVT prophylaxis. Lower extremity dopplers ordered.   Heme: Stable H/H. .   ID: Off antibiotics. Stable.  Therapy: OOB/ambulate  Tubes: Monitor Left pleural pigtail drain output and air leak. will maintain pleuravac on suction  Disposition: Aim to D/C to home when air leak resolves  Discussed with Cardiothoracic Team at AM rounds.

## 2017-10-21 NOTE — PROGRESS NOTE ADULT - PROBLEM SELECTOR PLAN 6
on steroids at this time: it was given only for a short duration: thinking his wheezing is secondary to rhinovirus infection: It will cont till tomorrow: thereafter would taper it down to off in next 2-3  10/1: change prednisone to 10 mg daily  10/02: cont prednisone  10/03: trop prednisone after today's dose  10/4 stop prednisone tomorrow  10/5: dc prednisone today : pt was not on any treatment for sarcoidosis before  10/06: OFF PREDNISONE NOW  10/7: start prednisone 20 mg per day  10/09: started back on IV steroids given his increased wheezing!!  10/10: wheezing better: decrease steroids to 20 mg q 8  10//: decrease and change prednisone to 40 mg daily: probably will be discharged on some prednisone  10/13: decrease prednisone to 30 mg tomorrow  10/15: on 30 mg of prednisone  10/16: on tapering steroids  10/17: stable: cont prednisone , will probably be dced on 20 mg of prednisone chan  10/17: stable   10/18: stable  10/19: cont steroids fro now  10/20: on 20 mg of prednisone for now  10/21: stable

## 2017-10-21 NOTE — PROGRESS NOTE ADULT - PROBLEM SELECTOR PLAN 2
pt has been having slowly decreasing platelets: he is on eliquis too: no bleeding. defer to primary team

## 2017-10-21 NOTE — PROGRESS NOTE ADULT - PROBLEM SELECTOR PLAN 3
0/3: on eliquis: toelrating well: no hemoptysis  10/4 : stable  10/05: persistent left upper air space following DAVID lobectomy: defer to thoracic surgery  10/06: stable?: no HEMOPTYSIS  10/7: resolved  10/8: cont prednisone: pneumothorax has resolved  10/09: resolved  10/10: no more hemoptysis  1/11: resolved after surgery and despite eliquis: stable  11/12: stable: s./p surgery: has mod pneumothorax: on the elft side: would defer to ct surgery: back on suction!  10/13: doing   ok  10/14: decrease dose to 30 mg for a few days and then reduce it to 20 mg: increase ptx today on chest radiograph: dw Dr Caldera  10/15: resolved: ptx on the left side is better today on the chest radiogrpah  10/16: small tiny left ptx: defer to thoracici  10/17: resolved  10/20: no more hemoptysis  10/18: doing ok  10/19: no more hemoptysis  10/21: no hemoptysis,

## 2017-10-21 NOTE — PROGRESS NOTE ADULT - SUBJECTIVE AND OBJECTIVE BOX
Patient is a 47y old  Male who presents with a chief complaint of "My leg wouldn't stop shaking." (14 Sep 2017 09:59)  pt is doing ok  SOB today when he was taken off suction   started using oxygen     Any change in ROS:     MEDICATIONS  (STANDING):  ALBUTerol/ipratropium for Nebulization 3 milliLiter(s) Nebulizer every 6 hours  apixaban 5 milliGRAM(s) Oral every 12 hours  aspirin  chewable 81 milliGRAM(s) Oral daily  atorvastatin 80 milliGRAM(s) Oral at bedtime  buDESOnide 160 MICROgram(s)/formoterol 4.5 MICROgram(s) Inhaler 2 Puff(s) Inhalation every 12 hours  diVALproex  milliGRAM(s) Oral two times a day  docusate sodium 100 milliGRAM(s) Oral two times a day  ferrous    sulfate 325 milliGRAM(s) Oral every 8 hours  folic acid 1 milliGRAM(s) Oral daily  ipratropium 17 MICROgram(s) HFA Inhaler 2 Puff(s) Inhalation every 8 hours  pantoprazole    Tablet 40 milliGRAM(s) Oral two times a day before meals  polyethylene glycol 3350 17 Gram(s) Oral at bedtime  predniSONE   Tablet   Oral   predniSONE   Tablet 20 milliGRAM(s) Oral daily  traZODone 50 milliGRAM(s) Oral at bedtime  voriconazole 200 milliGRAM(s) Oral every 12 hours    MEDICATIONS  (PRN):  acetaminophen    Suspension 650 milliGRAM(s) Oral every 6 hours PRN For Temp greater than 38 C (100.4 F)  acetaminophen   Tablet. 650 milliGRAM(s) Oral every 6 hours PRN Mild Pain (1 - 3)  aluminum hydroxide/magnesium hydroxide/simethicone Suspension 30 milliLiter(s) Oral every 6 hours PRN Dyspepsia  oxyCODONE    IR 5 milliGRAM(s) Oral every 4 hours PRN Moderate Pain (4 - 6)  oxyCODONE    IR 10 milliGRAM(s) Oral every 4 hours PRN Severe Pain (7 - 10)  temazepam 15 milliGRAM(s) Oral at bedtime PRN Insomnia    Vital Signs Last 24 Hrs  T(C): 36.6 (21 Oct 2017 05:50), Max: 37 (20 Oct 2017 15:58)  T(F): 97.9 (21 Oct 2017 05:50), Max: 98.6 (20 Oct 2017 15:58)  HR: 91 (21 Oct 2017 08:30) (73 - 94)  BP: 130/92 (21 Oct 2017 08:30) (112/70 - 130/92)  BP(mean): --  RR: 20 (21 Oct 2017 08:30) (18 - 20)  SpO2: 98% (21 Oct 2017 08:30) (96% - 100%)    I&O's Summary    20 Oct 2017 07:01  -  21 Oct 2017 07:00  --------------------------------------------------------  IN: 0 mL / OUT: 40 mL / NET: -40 mL          Physical Exam:   GENERAL: NAD, well-groomed, well-developed  HEENT: GIOVANI/   Atraumatic, Normocephalic  ENMT: No tonsillar erythema, exudates, or enlargement; Moist mucous membranes, Good dentition, No lesions  NECK: Supple, No JVD, Normal thyroid  CHEST/LUNG: Coarse rhonchi+  CVS: Regular rate and rhythm; No murmurs, rubs, or gallops  GI: : Soft, Nontender, Nondistended; Bowel sounds present  NERVOUS SYSTEM:  Alert & Oriented X3  EXTREMITIES:  2+ Peripheral Pulses, No clubbing, cyanosis, or edema  LYMPH: No lymphadenopathy noted  SKIN: No rashes or lesions  ENDOCRINOLOGY: No Thyromegaly  PSYCH: Appropriate    Labs:                              10.3   9.92  )-----------( 77       ( 20 Oct 2017 05:45 )             33.1                         10.3   8.41  )-----------( 74       ( 19 Oct 2017 05:44 )             32.6                         10.0   10.09 )-----------( 81       ( 18 Oct 2017 05:30 )             31.3     10-20    141  |  101  |  17  ----------------------------<  85  4.3   |  29  |  0.88  10-19    141  |  102  |  18  ----------------------------<  84  4.1   |  31  |  0.93    Ca    8.2<L>      20 Oct 2017 05:45      CAPILLARY BLOOD GLUCOSE                Cultures:     < from: Xray Chest 1 View AP -PORTABLE-Routine (10.20.17 @ 07:52) >    EXAM:  RAD CHEST PORTABLE ROUTINE        PROCEDURE DATE:  Oct 20 2017         INTERPRETATION:  CLINICAL INDICATION: Pneumothorax.    TECHNIQUE: Single AP view of the chest was obtained on 10/20/2017 at 7:23   AM.    COMPARISON: 10/19/2017 at 7:23 AM.    FINDINGS: There is a left-sided chest tube. Sutures overlie the left   lung. Small left pneumothorax is slightly decreased in size. Again noted   are bilateral regions of architectural distortion and cystic change.   There is a small left pleural effusion. The heart size is normal.    IMPRESSION: Small left pneumothorax slightly decreased in size. Small   left pleural effusion.                    ALYSSA CHIRINOS M.D., RADIOLOGY RESIDENT  This document has been electronically signed.  JULIETTE SMITH M.D., ATTENDING RADIOLOGIST  This document has been electronically signed. Oct 20 2017 11:45AM    < end of copied text >                          Studies  Chest X-RAY  CT SCAN Chest   Venous Dopplers: LE:   CT Abdomen  Others

## 2017-10-22 PROCEDURE — 71010: CPT | Mod: 26

## 2017-10-22 RX ADMIN — Medication 325 MILLIGRAM(S): at 13:43

## 2017-10-22 RX ADMIN — Medication 3 MILLILITER(S): at 16:39

## 2017-10-22 RX ADMIN — Medication 325 MILLIGRAM(S): at 22:19

## 2017-10-22 RX ADMIN — Medication 50 MILLIGRAM(S): at 22:19

## 2017-10-22 RX ADMIN — Medication 3 MILLILITER(S): at 21:29

## 2017-10-22 RX ADMIN — Medication 1 MILLIGRAM(S): at 13:43

## 2017-10-22 RX ADMIN — Medication 3 MILLILITER(S): at 10:41

## 2017-10-22 RX ADMIN — APIXABAN 5 MILLIGRAM(S): 2.5 TABLET, FILM COATED ORAL at 05:42

## 2017-10-22 RX ADMIN — Medication 325 MILLIGRAM(S): at 05:41

## 2017-10-22 RX ADMIN — PANTOPRAZOLE SODIUM 40 MILLIGRAM(S): 20 TABLET, DELAYED RELEASE ORAL at 17:33

## 2017-10-22 RX ADMIN — APIXABAN 5 MILLIGRAM(S): 2.5 TABLET, FILM COATED ORAL at 17:33

## 2017-10-22 RX ADMIN — VORICONAZOLE 200 MILLIGRAM(S): 10 INJECTION, POWDER, LYOPHILIZED, FOR SOLUTION INTRAVENOUS at 05:41

## 2017-10-22 RX ADMIN — Medication 3 MILLILITER(S): at 04:55

## 2017-10-22 RX ADMIN — DIVALPROEX SODIUM 500 MILLIGRAM(S): 500 TABLET, DELAYED RELEASE ORAL at 05:40

## 2017-10-22 RX ADMIN — ATORVASTATIN CALCIUM 80 MILLIGRAM(S): 80 TABLET, FILM COATED ORAL at 22:19

## 2017-10-22 RX ADMIN — Medication 10 MILLIGRAM(S): at 13:42

## 2017-10-22 RX ADMIN — DIVALPROEX SODIUM 500 MILLIGRAM(S): 500 TABLET, DELAYED RELEASE ORAL at 17:32

## 2017-10-22 RX ADMIN — VORICONAZOLE 200 MILLIGRAM(S): 10 INJECTION, POWDER, LYOPHILIZED, FOR SOLUTION INTRAVENOUS at 17:33

## 2017-10-22 RX ADMIN — Medication 100 MILLIGRAM(S): at 17:32

## 2017-10-22 RX ADMIN — PANTOPRAZOLE SODIUM 40 MILLIGRAM(S): 20 TABLET, DELAYED RELEASE ORAL at 05:41

## 2017-10-22 RX ADMIN — BUDESONIDE AND FORMOTEROL FUMARATE DIHYDRATE 2 PUFF(S): 160; 4.5 AEROSOL RESPIRATORY (INHALATION) at 22:21

## 2017-10-22 RX ADMIN — Medication 20 MILLIGRAM(S): at 05:40

## 2017-10-22 RX ADMIN — BUDESONIDE AND FORMOTEROL FUMARATE DIHYDRATE 2 PUFF(S): 160; 4.5 AEROSOL RESPIRATORY (INHALATION) at 09:59

## 2017-10-22 RX ADMIN — Medication 81 MILLIGRAM(S): at 13:43

## 2017-10-22 NOTE — PROGRESS NOTE ADULT - PROBLEM SELECTOR PLAN 6
10/1: change prednisone to 10 mg daily  10/02: cont prednisone  10/03: trop prednisone after today's dose  10/4 stop prednisone tomorrow  10/5: dc prednisone today : pt was not on any treatment for sarcoidosis before  10/06: OFF PREDNISONE NOW  10/7: start prednisone 20 mg per day  10/09: started back on IV steroids given his increased wheezing!!  10/10: wheezing better: decrease steroids to 20 mg q 8  10//: decrease and change prednisone to 40 mg daily: probably will be discharged on some prednisone  10/13: decrease prednisone to 30 mg tomorrow  10/15: on 30 mg of prednisone  10/16: on tapering steroids  10/17: stable: cont prednisone , will probably be dced on 20 mg of prednisone chan  10/17: stable   10/18: stable  10/19: cont steroids fro now  10/20: on 20 mg of prednisone for now  10/21: stable  10/22: decrease prednisone to 10 mg a day :he has not been wheezing today

## 2017-10-22 NOTE — PROGRESS NOTE ADULT - SUBJECTIVE AND OBJECTIVE BOX
Patient is a 47y old  Male who presents with a chief complaint of "My leg wouldn't stop shaking." (14 Sep 2017 09:59)  doing ok   however has persistent air leak     Any change in ROS:     MEDICATIONS  (STANDING):  ALBUTerol/ipratropium for Nebulization 3 milliLiter(s) Nebulizer every 6 hours  apixaban 5 milliGRAM(s) Oral every 12 hours  aspirin  chewable 81 milliGRAM(s) Oral daily  atorvastatin 80 milliGRAM(s) Oral at bedtime  buDESOnide 160 MICROgram(s)/formoterol 4.5 MICROgram(s) Inhaler 2 Puff(s) Inhalation every 12 hours  diVALproex  milliGRAM(s) Oral two times a day  docusate sodium 100 milliGRAM(s) Oral two times a day  ferrous    sulfate 325 milliGRAM(s) Oral every 8 hours  folic acid 1 milliGRAM(s) Oral daily  pantoprazole    Tablet 40 milliGRAM(s) Oral two times a day before meals  polyethylene glycol 3350 17 Gram(s) Oral at bedtime  predniSONE   Tablet   Oral   predniSONE   Tablet 20 milliGRAM(s) Oral daily  traZODone 50 milliGRAM(s) Oral at bedtime  voriconazole 200 milliGRAM(s) Oral every 12 hours    MEDICATIONS  (PRN):  acetaminophen    Suspension 650 milliGRAM(s) Oral every 6 hours PRN For Temp greater than 38 C (100.4 F)  acetaminophen   Tablet. 650 milliGRAM(s) Oral every 6 hours PRN Mild Pain (1 - 3)  aluminum hydroxide/magnesium hydroxide/simethicone Suspension 30 milliLiter(s) Oral every 6 hours PRN Dyspepsia  temazepam 15 milliGRAM(s) Oral at bedtime PRN Insomnia    Vital Signs Last 24 Hrs  T(C): 36.8 (22 Oct 2017 08:10), Max: 37 (21 Oct 2017 21:13)  T(F): 98.2 (22 Oct 2017 08:10), Max: 98.6 (21 Oct 2017 21:13)  HR: 88 (22 Oct 2017 10:43) (66 - 99)  BP: 123/75 (22 Oct 2017 08:10) (102/74 - 130/73)  BP(mean): --  RR: 18 (22 Oct 2017 08:10) (18 - 20)  SpO2: 99% (22 Oct 2017 10:43) (97% - 99%)    I&O's Summary    21 Oct 2017 07:01  -  22 Oct 2017 07:00  --------------------------------------------------------  IN: 0 mL / OUT: 657 mL / NET: -657 mL          Physical Exam:   GENERAL: NAD, well-groomed, well-developed  HEENT: GIOVANI/   Atraumatic, Normocephalic  ENMT: No tonsillar erythema, exudates, or enlargement; Moist mucous membranes, Good dentition, No lesions  NECK: Supple, No JVD, Normal thyroid  CHEST/LUNG: Clear to auscultaion  CVS: Regular rate and rhythm; No murmurs, rubs, or gallops  GI: : Soft, Nontender, Nondistended; Bowel sounds present  NERVOUS SYSTEM:  Alert & Oriented X3  EXTREMITIES:  decreased  edema  LYMPH: No lymphadenopathy noted  SKIN: No rashes or lesions  ENDOCRINOLOGY: No Thyromegaly  PSYCH: Appropriate    Labs:                              10.3   9.92  )-----------( 77       ( 20 Oct 2017 05:45 )             33.1                         10.3   8.41  )-----------( 74       ( 19 Oct 2017 05:44 )             32.6     10-20    141  |  101  |  17  ----------------------------<  85  4.3   |  29  |  0.88  10-19    141  |  102  |  18  ----------------------------<  84  4.1   |  31  |  0.93        CAPILLARY BLOOD GLUCOSE                Cultures:         < from: Xray Chest 1 View AP/PA (10.21.17 @ 11:20) >  TION:  TIME OF EXAM: October 21, 2017 at 7:23 AM; 10:47 AM    CLINICAL INFORMATION: Follow-up pneumothorax.    TECHNIQUE:   Portable chest    INTERPRETATION:     7:23 AM:  Pigtail catheter overlies the left hemithorax. Left pneumothorax again   seen. Perihilar interstitial opacities unchanged due to sarcoidosis.    10:47 AM:  Left pneumothorax appears to have increased withthe apical pleural line   now at the level of the third posterior interspace. No other change.   Left-sided pigtail catheter identified unchanged.      COMPARISON:  October 20      IMPRESSION:  Follow-up left pneumothorax with increase on most recent  exam.                  RANDALL AMADOR M.D.; ATTENDING RADIOLOGIST  This document has been electronically signed. Oct 21 2017 11:56AM    < end of copied text >                      Studies  Chest X-RAY  CT SCAN Chest   Venous Dopplers: LE:   CT Abdomen  Others

## 2017-10-22 NOTE — PROGRESS NOTE ADULT - PROBLEM SELECTOR PLAN 1
s/p blood patch, but still with air leak: defer to thoracici surgery  10/22: increased on yesterdays chest -ray: failed blood patch.....next step? pleurodesis

## 2017-10-22 NOTE — PROGRESS NOTE ADULT - PROBLEM SELECTOR PLAN 3
0/3: on eliquis: toelrating well: no hemoptysis  10/4 : stable  10/05: persistent left upper air space following DAVID lobectomy: defer to thoracic surgery  10/06: stable?: no HEMOPTYSIS  10/7: resolved  10/8: cont prednisone: pneumothorax has resolved  10/09: resolved  10/10: no more hemoptysis  1/11: resolved after surgery and despite eliquis: stable  11/12: stable: s./p surgery: has mod pneumothorax: on the elft side: would defer to ct surgery: back on suction!  10/13: doing   ok  10/14: decrease dose to 30 mg for a few days and then reduce it to 20 mg: increase ptx today on chest radiograph: dw Dr Caldera  10/15: resolved: ptx on the left side is better today on the chest radiogrpah  10/16: small tiny left ptx: defer to thoracici  10/17: resolved  10/20: no more hemoptysis  10/18: doing ok  10/19: no more hemoptysis  10/21: no hemoptysis,  10/22: resolved

## 2017-10-22 NOTE — PROGRESS NOTE ADULT - SUBJECTIVE AND OBJECTIVE BOX
Subjective:    Vital Signs:  Vital Signs Last 24 Hrs  T(C): 36.8 (10-22-17 @ 05:36), Max: 37 (10-21-17 @ 21:13)  T(F): 98.2 (10-22-17 @ 05:36), Max: 98.6 (10-21-17 @ 21:13)  HR: 73 (10-22-17 @ 05:36) (66 - 91)  BP: 111/76 (10-22-17 @ 05:36) (102/74 - 130/92)  RR: 18 (10-22-17 @ 05:36) (18 - 20)  SpO2: 99% (10-22-17 @ 05:36) (97% - 99%) on (O2)    Telemetry/Alarms:  General: WN/WD NAD  Neurology: Awake, nonfocal, BETH x 4  Eyes: Scleras clear, PERRLA/ EOMI, Gross vision intact  ENT:Gross hearing intact, grossly patent pharynx, no stridor  Neck: Neck supple, trachea midline, No JVD,   Respiratory: CTA B/L, No wheezing, rales, rhonchi  CV: RRR, S1S2, no murmurs, rubs or gallops  Abdominal: Soft, NT, ND +BS,   Extremities: No edema, + peripheral pulses  Skin: No Rashes, Hematoma, Ecchymosis  Lymphatic: No Neck, axilla, groin LAD  Psych: Oriented x 3, normal affect  Incisions:   Tubes:  Relevant labs, radiology and Medications reviewed            MEDICATIONS  (STANDING):  ALBUTerol/ipratropium for Nebulization 3 milliLiter(s) Nebulizer every 6 hours  apixaban 5 milliGRAM(s) Oral every 12 hours  aspirin  chewable 81 milliGRAM(s) Oral daily  atorvastatin 80 milliGRAM(s) Oral at bedtime  buDESOnide 160 MICROgram(s)/formoterol 4.5 MICROgram(s) Inhaler 2 Puff(s) Inhalation every 12 hours  diVALproex  milliGRAM(s) Oral two times a day  docusate sodium 100 milliGRAM(s) Oral two times a day  ferrous    sulfate 325 milliGRAM(s) Oral every 8 hours  folic acid 1 milliGRAM(s) Oral daily  pantoprazole    Tablet 40 milliGRAM(s) Oral two times a day before meals  polyethylene glycol 3350 17 Gram(s) Oral at bedtime  predniSONE   Tablet   Oral   predniSONE   Tablet 20 milliGRAM(s) Oral daily  traZODone 50 milliGRAM(s) Oral at bedtime  voriconazole 200 milliGRAM(s) Oral every 12 hours    MEDICATIONS  (PRN):  acetaminophen    Suspension 650 milliGRAM(s) Oral every 6 hours PRN For Temp greater than 38 C (100.4 F)  acetaminophen   Tablet. 650 milliGRAM(s) Oral every 6 hours PRN Mild Pain (1 - 3)  aluminum hydroxide/magnesium hydroxide/simethicone Suspension 30 milliLiter(s) Oral every 6 hours PRN Dyspepsia  temazepam 15 milliGRAM(s) Oral at bedtime PRN Insomnia    Pertinent Physical Exam  I&O's Summary    21 Oct 2017 07:01  -  22 Oct 2017 07:00  --------------------------------------------------------  IN: 0 mL / OUT: 657 mL / NET: -657 mL        Assessment  47y Male  w/ PAST MEDICAL & SURGICAL HISTORY:  History of pneumothorax: History of 3 prior pneumonthoracies.  COPD (chronic obstructive pulmonary disease)  Asthma  Hypertension  Sarcoidosis  No significant past surgical history  admitted with complaints of Patient is a 47y old  Male who presents with a chief complaint of "My leg wouldn't stop shaking." (14 Sep 2017 09:59)  .  On (Date), patient underwent Thoracotomy  Central venous catheter placement with ultrasound guidance  Thoracoscopic pneumolysis  Thoracotomy, with lung lobectomy  Bronchoscopy with bronchoalveolar lavage of both sides  . Postoperative course/issues:    PLAN  Neuro: Pain management  Pulm: Encourage coughing, deep breathing and use of incentive spirometry. Wean off supplemental oxygen as able. Daily CXR.   Cardio: Monitor telemetry/alarms  GI: Tolerating diet. Continue stool softeners.  Renal: monitor urine output, supplement electrolytes as needed  Vasc: Heparin SC/SCDs for DVT prophylaxis  Heme: Stable H/H. .   ID: Off antibiotics. Stable.  Therapy: OOB/ambulate  Tubes: Monitor Chest tube output  Disposition: Aim to D/C to home on  Discussed with Cardiothoracic Team at AM rounds. Subjective:    Vital Signs: Hemodynamically stable and afebrile  Vital Signs Last 24 Hrs  T(C): 36.8 (10-22-17 @ 05:36), Max: 37 (10-21-17 @ 21:13)  T(F): 98.2 (10-22-17 @ 05:36), Max: 98.6 (10-21-17 @ 21:13)  HR: 73 (10-22-17 @ 05:36) (66 - 91)  BP: 111/76 (10-22-17 @ 05:36) (102/74 - 130/92)  RR: 18 (10-22-17 @ 05:36) (18 - 20)  SpO2: 99% (10-22-17 @ 05:36) (97% - 99%) on (O2)    Telemetry/Alarms:  General: WN/WD NAD  Neurology: Awake, nonfocal, BETH x 4  Eyes: Scleras clear, PERRLA/ EOMI, Gross vision intact  ENT:Gross hearing intact, grossly patent pharynx, no stridor  Neck: Neck supple, trachea midline, No JVD,   Respiratory: CTA->right; Decreased left B/L, Scattered rhonchi  CV: RRR, S1S2, no murmurs, rubs or gallops  Abdominal: Soft, NT, ND +BS,   Extremities: Left upper and lower ext, no calf pain or tenderness  Skin: No Rashes, Hematoma, Ecchymosis  Lymphatic: No Neck, axilla, groin LAD  Psych: Oriented x 3, normal affect  Incisions: No s-s of infection  Tubes: Left PTC->suction, drained 7ml/24hrs passive air leak  Relevant labs, radiology->7:23 AM: igtail catheter overlies the left hemithorax. Left pneumothorax again seen. Perihilar interstitial opacities unchanged due to sarcoidosis.  10:47 AM:  Left pneumothorax appears to have increased with the apical pleural line now at the level of the third posterior interspace. No other change. Left-sided pigtail catheter identified unchanged.  COMPARISON: October 20  IMPRESSION: Follow-up left pneumothorax with increase on most recent exam.  10/22 IMPRESSION:  Left lung postsurgical changes with left-sided pigtail catheter and left-sided pneumothorax is slightly decreased in size compared to the prior study. and medication reviewed        MEDICATIONS  (STANDING):  ALBUTerol/ipratropium for Nebulization 3 milliLiter(s) Nebulizer every 6 hours  apixaban 5 milliGRAM(s) Oral every 12 hours  aspirin  chewable 81 milliGRAM(s) Oral daily  atorvastatin 80 milliGRAM(s) Oral at bedtime  buDESOnide 160 MICROgram(s)/formoterol 4.5 MICROgram(s) Inhaler 2 Puff(s) Inhalation every 12 hours  diVALproex  milliGRAM(s) Oral two times a day  docusate sodium 100 milliGRAM(s) Oral two times a day  ferrous    sulfate 325 milliGRAM(s) Oral every 8 hours  folic acid 1 milliGRAM(s) Oral daily  pantoprazole    Tablet 40 milliGRAM(s) Oral two times a day before meals  polyethylene glycol 3350 17 Gram(s) Oral at bedtime  predniSONE   Tablet   Oral   predniSONE   Tablet 20 milliGRAM(s) Oral daily  traZODone 50 milliGRAM(s) Oral at bedtime  voriconazole 200 milliGRAM(s) Oral every 12 hours    MEDICATIONS  (PRN):  acetaminophen    Suspension 650 milliGRAM(s) Oral every 6 hours PRN For Temp greater than 38 C (100.4 F)  acetaminophen   Tablet. 650 milliGRAM(s) Oral every 6 hours PRN Mild Pain (1 - 3)  aluminum hydroxide/magnesium hydroxide/simethicone Suspension 30 milliLiter(s) Oral every 6 hours PRN Dyspepsia  temazepam 15 milliGRAM(s) Oral at bedtime PRN Insomnia    Pertinent Physical Exam  I&O's Summary    21 Oct 2017 07:01  -  22 Oct 2017 07:00  --------------------------------------------------------  IN: 0 mL / OUT: 657 mL / NET: -657 mL        Assessment  47y Male  w/ PAST MEDICAL & SURGICAL HISTORY:  History of pneumothorax: History of 3 prior pneumonthoracies.  COPD (chronic obstructive pulmonary disease)  Asthma  Hypertension  Sarcoidosis  No significant past surgical history  admitted with complaints of Patient is a 47y old  Male who presents with a chief complaint of "My leg wouldn't stop shaking." (14 Sep 2017 09:59)  .  On (Date), patient underwent Thoracotomy  Central venous catheter placement with ultrasound guidance  Thoracoscopic pneumolysis  Thoracotomy, with lung lobectomy  Bronchoscopy with bronchoalveolar lavage of both sides  . Postoperative course/issues:    PLAN  Lower ext. dop  Neuro: Pain management  Pulm: Encourage coughing, deep breathing and use of incentive spirometry.   Daily CXR.   Cardio: Monitor telemetry/alarms  GI: Tolerating diet. Continue stool softeners.  Renal: monitor urine output, supplement electrolytes as needed  Vasc: Heparin SC/SCDs for DVT prophylaxis  Heme: Stable H/H. .   ID: Off antibiotics. Stable.  Therapy: OOB/ambulate  Tubes: Monitor Chest tube output, cont chest tube to suction. consider talc pleurodesis at bedside  Disposition: Aim to D/C to rehab as per Dr Caldera  Discussed with Cardiothoracic Team at AM rounds.

## 2017-10-22 NOTE — PROGRESS NOTE ADULT - PROBLEM SELECTOR PLAN 2
pt has been having slowly decreasing platelets: he is on eliquis too: no bleeding. defer to primary team  10/22: please recheck his platelets

## 2017-10-23 LAB
FUNGUS SPEC QL CULT: SIGNIFICANT CHANGE UP
HCT VFR BLD CALC: 39 % — SIGNIFICANT CHANGE UP (ref 39–50)
HGB BLD-MCNC: 12.2 G/DL — LOW (ref 13–17)
MCHC RBC-ENTMCNC: 31.3 % — LOW (ref 32–36)
MCHC RBC-ENTMCNC: 31.3 PG — SIGNIFICANT CHANGE UP (ref 27–34)
MCV RBC AUTO: 100 FL — SIGNIFICANT CHANGE UP (ref 80–100)
NRBC # FLD: 0 — SIGNIFICANT CHANGE UP
PLATELET # BLD AUTO: 79 K/UL — LOW (ref 150–400)
PMV BLD: 13.7 FL — HIGH (ref 7–13)
RBC # BLD: 3.9 M/UL — LOW (ref 4.2–5.8)
RBC # FLD: 16.6 % — HIGH (ref 10.3–14.5)
WBC # BLD: 16.97 K/UL — HIGH (ref 3.8–10.5)
WBC # FLD AUTO: 16.97 K/UL — HIGH (ref 3.8–10.5)

## 2017-10-23 PROCEDURE — 71010: CPT | Mod: 26

## 2017-10-23 RX ORDER — OXYCODONE HYDROCHLORIDE 5 MG/1
10 TABLET ORAL ONCE
Qty: 0 | Refills: 0 | Status: DISCONTINUED | OUTPATIENT
Start: 2017-10-23 | End: 2017-10-23

## 2017-10-23 RX ORDER — TALC 4 G/50ML
4 POWDER INTRAPLEURAL ONCE
Qty: 0 | Refills: 0 | Status: COMPLETED | OUTPATIENT
Start: 2017-10-23 | End: 2017-10-23

## 2017-10-23 RX ORDER — ALPRAZOLAM 0.25 MG
0.25 TABLET ORAL ONCE
Qty: 0 | Refills: 0 | Status: DISCONTINUED | OUTPATIENT
Start: 2017-10-23 | End: 2017-10-23

## 2017-10-23 RX ORDER — LIDOCAINE HCL 20 MG/ML
20 VIAL (ML) INJECTION ONCE
Qty: 0 | Refills: 0 | Status: COMPLETED | OUTPATIENT
Start: 2017-10-23 | End: 2017-10-23

## 2017-10-23 RX ORDER — OXYCODONE HYDROCHLORIDE 5 MG/1
5 TABLET ORAL ONCE
Qty: 0 | Refills: 0 | Status: DISCONTINUED | OUTPATIENT
Start: 2017-10-23 | End: 2017-10-23

## 2017-10-23 RX ADMIN — Medication 81 MILLIGRAM(S): at 13:34

## 2017-10-23 RX ADMIN — Medication 3 MILLILITER(S): at 10:04

## 2017-10-23 RX ADMIN — VORICONAZOLE 200 MILLIGRAM(S): 10 INJECTION, POWDER, LYOPHILIZED, FOR SOLUTION INTRAVENOUS at 05:51

## 2017-10-23 RX ADMIN — Medication 10 MILLIGRAM(S): at 06:47

## 2017-10-23 RX ADMIN — OXYCODONE HYDROCHLORIDE 10 MILLIGRAM(S): 5 TABLET ORAL at 22:25

## 2017-10-23 RX ADMIN — Medication 100 MILLIGRAM(S): at 17:33

## 2017-10-23 RX ADMIN — VORICONAZOLE 200 MILLIGRAM(S): 10 INJECTION, POWDER, LYOPHILIZED, FOR SOLUTION INTRAVENOUS at 17:33

## 2017-10-23 RX ADMIN — Medication 0.25 MILLIGRAM(S): at 10:00

## 2017-10-23 RX ADMIN — DIVALPROEX SODIUM 500 MILLIGRAM(S): 500 TABLET, DELAYED RELEASE ORAL at 17:33

## 2017-10-23 RX ADMIN — Medication 100 MILLIGRAM(S): at 05:50

## 2017-10-23 RX ADMIN — OXYCODONE HYDROCHLORIDE 5 MILLIGRAM(S): 5 TABLET ORAL at 21:31

## 2017-10-23 RX ADMIN — Medication 1 MILLIGRAM(S): at 13:35

## 2017-10-23 RX ADMIN — PANTOPRAZOLE SODIUM 40 MILLIGRAM(S): 20 TABLET, DELAYED RELEASE ORAL at 17:33

## 2017-10-23 RX ADMIN — Medication 325 MILLIGRAM(S): at 13:34

## 2017-10-23 RX ADMIN — Medication 3 MILLILITER(S): at 22:55

## 2017-10-23 RX ADMIN — Medication 325 MILLIGRAM(S): at 21:38

## 2017-10-23 RX ADMIN — BUDESONIDE AND FORMOTEROL FUMARATE DIHYDRATE 2 PUFF(S): 160; 4.5 AEROSOL RESPIRATORY (INHALATION) at 08:38

## 2017-10-23 RX ADMIN — PANTOPRAZOLE SODIUM 40 MILLIGRAM(S): 20 TABLET, DELAYED RELEASE ORAL at 05:51

## 2017-10-23 RX ADMIN — DIVALPROEX SODIUM 500 MILLIGRAM(S): 500 TABLET, DELAYED RELEASE ORAL at 05:50

## 2017-10-23 RX ADMIN — Medication 3 MILLILITER(S): at 16:26

## 2017-10-23 RX ADMIN — OXYCODONE HYDROCHLORIDE 10 MILLIGRAM(S): 5 TABLET ORAL at 21:38

## 2017-10-23 RX ADMIN — ATORVASTATIN CALCIUM 80 MILLIGRAM(S): 80 TABLET, FILM COATED ORAL at 21:38

## 2017-10-23 RX ADMIN — OXYCODONE HYDROCHLORIDE 5 MILLIGRAM(S): 5 TABLET ORAL at 20:34

## 2017-10-23 RX ADMIN — APIXABAN 5 MILLIGRAM(S): 2.5 TABLET, FILM COATED ORAL at 05:50

## 2017-10-23 RX ADMIN — Medication 20 MILLILITER(S): at 10:01

## 2017-10-23 RX ADMIN — Medication 650 MILLIGRAM(S): at 21:38

## 2017-10-23 RX ADMIN — Medication 50 MILLIGRAM(S): at 21:38

## 2017-10-23 RX ADMIN — TALC 4 GRAM(S): 4 POWDER INTRAPLEURAL at 11:11

## 2017-10-23 RX ADMIN — APIXABAN 5 MILLIGRAM(S): 2.5 TABLET, FILM COATED ORAL at 17:33

## 2017-10-23 RX ADMIN — Medication 325 MILLIGRAM(S): at 05:51

## 2017-10-23 NOTE — PROGRESS NOTE ADULT - SUBJECTIVE AND OBJECTIVE BOX
Subjective:    Vital Signs:  Vital Signs Last 24 Hrs  T(C): 36.9 (10-23-17 @ 10:20), Max: 37.3 (10-22-17 @ 16:13)  T(F): 98.5 (10-23-17 @ 10:20), Max: 99.1 (10-22-17 @ 16:13)  HR: 89 (10-23-17 @ 10:20) (67 - 92)  BP: 123/76 (10-23-17 @ 10:20) (111/65 - 127/83)  RR: 18 (10-23-17 @ 10:20) (18 - 18)  SpO2: 100% (10-23-17 @ 10:20) (97% - 100%) on (O2)    Telemetry/Alarms:  General: WN/WD NAD  Neurology: Awake, nonfocal, BETH x 4  Eyes: Scleras clear, PERRLA/ EOMI, Gross vision intact  ENT:Gross hearing intact, grossly patent pharynx, no stridor  Neck: Neck supple, trachea midline, No JVD,   Respiratory: CTA B/L, No wheezing, rales, rhonchi  CV: RRR, S1S2, no murmurs, rubs or gallops  Abdominal: Soft, NT, ND +BS,   Extremities: No edema, + peripheral pulses  Skin: No Rashes, Hematoma, Ecchymosis  Lymphatic: No Neck, axilla, groin LAD  Psych: Oriented x 3, normal affect  Incisions:   Tubes:  Relevant labs, radiology and Medications reviewed            MEDICATIONS  (STANDING):  ALBUTerol/ipratropium for Nebulization 3 milliLiter(s) Nebulizer every 6 hours  apixaban 5 milliGRAM(s) Oral every 12 hours  aspirin  chewable 81 milliGRAM(s) Oral daily  atorvastatin 80 milliGRAM(s) Oral at bedtime  buDESOnide 160 MICROgram(s)/formoterol 4.5 MICROgram(s) Inhaler 2 Puff(s) Inhalation every 12 hours  diVALproex  milliGRAM(s) Oral two times a day  docusate sodium 100 milliGRAM(s) Oral two times a day  ferrous    sulfate 325 milliGRAM(s) Oral every 8 hours  folic acid 1 milliGRAM(s) Oral daily  pantoprazole    Tablet 40 milliGRAM(s) Oral two times a day before meals  polyethylene glycol 3350 17 Gram(s) Oral at bedtime  predniSONE   Tablet 10 milliGRAM(s) Oral daily  traZODone 50 milliGRAM(s) Oral at bedtime  voriconazole 200 milliGRAM(s) Oral every 12 hours    MEDICATIONS  (PRN):  acetaminophen    Suspension 650 milliGRAM(s) Oral every 6 hours PRN For Temp greater than 38 C (100.4 F)  acetaminophen   Tablet. 650 milliGRAM(s) Oral every 6 hours PRN Mild Pain (1 - 3)  aluminum hydroxide/magnesium hydroxide/simethicone Suspension 30 milliLiter(s) Oral every 6 hours PRN Dyspepsia  temazepam 15 milliGRAM(s) Oral at bedtime PRN Insomnia    Pertinent Physical Exam  I&O's Summary    22 Oct 2017 07:01  -  23 Oct 2017 07:00  --------------------------------------------------------  IN: 0 mL / OUT: 963 mL / NET: -963 mL        Assessment  47y Male  w/ PAST MEDICAL & SURGICAL HISTORY:  History of pneumothorax: History of 3 prior pneumonthoracies.  COPD (chronic obstructive pulmonary disease)  Asthma  Hypertension  Sarcoidosis  No significant past surgical history  admitted with complaints of Patient is a 47y old  Male who presents with a chief complaint of "My leg wouldn't stop shaking." (14 Sep 2017 09:59)  .  On (Date), patient underwent Thoracotomy  Central venous catheter placement with ultrasound guidance  Thoracoscopic pneumolysis  Thoracotomy, with lung lobectomy  Bronchoscopy with bronchoalveolar lavage of both sides  . Postoperative course/issues:    PLAN  Neuro: Pain management  Pulm: Encourage coughing, deep breathing and use of incentive spirometry. Wean off supplemental oxygen as able. Daily CXR.   Cardio: Monitor telemetry/alarms  GI: Tolerating diet. Continue stool softeners.  Renal: monitor urine output, supplement electrolytes as needed  Vasc: Heparin SC/SCDs for DVT prophylaxis  Heme: Stable H/H. .   ID: Off antibiotics. Stable.  Therapy: OOB/ambulate  Tubes: Monitor Chest tube output  Disposition: Aim to D/C to home on  Discussed with Cardiothoracic Team at AM rounds. Subjective:    Vital Signs: Hemodynamically stable and afebrile  Vital Signs Last 24 Hrs  T(C): 36.9 (10-23-17 @ 10:20), Max: 37.3 (10-22-17 @ 16:13)  T(F): 98.5 (10-23-17 @ 10:20), Max: 99.1 (10-22-17 @ 16:13)  HR: 89 (10-23-17 @ 10:20) (67 - 92)  BP: 123/76 (10-23-17 @ 10:20) (111/65 - 127/83)  RR: 18 (10-23-17 @ 10:20) (18 - 18)  SpO2: 100% (10-23-17 @ 10:20) (97% - 100%) on 21%O2    Telemetry/Alarms: NSR  General: WN/WD NAD  Neurology: Awake, nonfocal, BETH x 4  Eyes: Scleras clear, PERRLA/ EOMI, Gross vision intact  ENT:Gross hearing intact, grossly patent pharynx, no stridor  Neck: Neck supple, trachea midline, No JVD,   Respiratory: Decreased B/L, a few scattered rhonchi  CV: RRR, S1S2, no murmurs, rubs or gallops  Abdominal: Soft, NT, ND +BS,   Extremities: Lt upper + lower ext. edema, + peripheral pulses, no calf pain, tenderness  Skin: No Rashes, Hematoma, Ecchymosis  Lymphatic: No Neck, axilla, groin LAD  Psych: Oriented x 3, normal affect  Incisions: No s-s of infection  Tubes: Lt PTC->suction   Relevant labs, radiology-> and Medications reviewed        MEDICATIONS  (STANDING):  ALBUTerol/ipratropium for Nebulization 3 milliLiter(s) Nebulizer every 6 hours  apixaban 5 milliGRAM(s) Oral every 12 hours  aspirin  chewable 81 milliGRAM(s) Oral daily  atorvastatin 80 milliGRAM(s) Oral at bedtime  buDESOnide 160 MICROgram(s)/formoterol 4.5 MICROgram(s) Inhaler 2 Puff(s) Inhalation every 12 hours  diVALproex  milliGRAM(s) Oral two times a day  docusate sodium 100 milliGRAM(s) Oral two times a day  ferrous    sulfate 325 milliGRAM(s) Oral every 8 hours  folic acid 1 milliGRAM(s) Oral daily  pantoprazole    Tablet 40 milliGRAM(s) Oral two times a day before meals  polyethylene glycol 3350 17 Gram(s) Oral at bedtime  predniSONE   Tablet 10 milliGRAM(s) Oral daily  traZODone 50 milliGRAM(s) Oral at bedtime  voriconazole 200 milliGRAM(s) Oral every 12 hours    MEDICATIONS  (PRN):  acetaminophen    Suspension 650 milliGRAM(s) Oral every 6 hours PRN For Temp greater than 38 C (100.4 F)  acetaminophen   Tablet. 650 milliGRAM(s) Oral every 6 hours PRN Mild Pain (1 - 3)  aluminum hydroxide/magnesium hydroxide/simethicone Suspension 30 milliLiter(s) Oral every 6 hours PRN Dyspepsia  temazepam 15 milliGRAM(s) Oral at bedtime PRN Insomnia    Pertinent Physical Exam  I&O's Summary    22 Oct 2017 07:01  -  23 Oct 2017 07:00  --------------------------------------------------------  IN: 0 mL / OUT: 963 mL / NET: -963 mL        Assessment  47y Male  w/ PAST MEDICAL & SURGICAL HISTORY:  History of pneumothorax: History of 3 prior pneumonthoracies.  COPD (chronic obstructive pulmonary disease)  Asthma  Hypertension  Sarcoidosis  No significant past surgical history  admitted with complaints of Patient is a 47y old  Male who presents with a chief complaint of "My leg wouldn't stop shaking." (14 Sep 2017 09:59)  .  On (Date), patient underwent Thoracotomy  Central venous catheter placement with ultrasound guidance  Thoracoscopic pneumolysis  Thoracotomy, with lung lobectomy  Bronchoscopy with bronchoalveolar lavage of both sides  . Postoperative course/issues:    PLAN  Neuro: Pain management  Pulm: Encourage coughing, deep breathing and use of incentive spirometry. Wean off supplemental oxygen as able. Daily CXR.   Cardio: Monitor telemetry/alarms  GI: Tolerating diet. Continue stool softeners.  Renal: monitor urine output, supplement electrolytes as needed  Vasc: Heparin SC/SCDs for DVT prophylaxis  Heme: Stable H/H. .   ID: Off antibiotics. Stable.  Therapy: OOB/ambulate  Tubes: Monitor Chest tube output  Disposition: Aim to D/C to home on  Discussed with Cardiothoracic Team at AM rounds.

## 2017-10-23 NOTE — PROGRESS NOTE ADULT - PROBLEM SELECTOR PLAN 1
s/p blood patch, but still with air leak: defer to thoracici surgery  10/22: increased on yesterdays chest -ray: failed blood patch.....next step? pleurodesis  10/23: for pleurodesis?

## 2017-10-23 NOTE — PROGRESS NOTE ADULT - PROBLEM SELECTOR PLAN 3
10/06: stable?: no HEMOPTYSIS  10/7: resolved  10/8: cont prednisone: pneumothorax has resolved  10/09: resolved  10/10: no more hemoptysis  1/11: resolved after surgery and despite eliquis: stable  11/12: stable: s./p surgery: has mod pneumothorax: on the elft side: would defer to ct surgery: back on suction!  10/13: doing   ok  10/14: decrease dose to 30 mg for a few days and then reduce it to 20 mg: increase ptx today on chest radiograph: dw Dr Caldera  10/15: resolved: ptx on the left side is better today on the chest radiogrpah  10/16: small tiny left ptx: defer to thoracici  10/17: resolved  10/20: no more hemoptysis  10/18: doing ok  10/19: no more hemoptysis  10/21: no hemoptysis,  10/22: resolved  10/23: none

## 2017-10-23 NOTE — PROGRESS NOTE ADULT - SUBJECTIVE AND OBJECTIVE BOX
Patient is a 47y old  Male who presents with a chief complaint of "My leg wouldn't stop shaking." (14 Sep 2017 09:59)  s/p pleurodesis? today   pt has not been wheezing  steroids had been decreased to 10 mg a day !!!    Any change in ROS:     MEDICATIONS  (STANDING):  ALBUTerol/ipratropium for Nebulization 3 milliLiter(s) Nebulizer every 6 hours  apixaban 5 milliGRAM(s) Oral every 12 hours  aspirin  chewable 81 milliGRAM(s) Oral daily  atorvastatin 80 milliGRAM(s) Oral at bedtime  buDESOnide 160 MICROgram(s)/formoterol 4.5 MICROgram(s) Inhaler 2 Puff(s) Inhalation every 12 hours  diVALproex  milliGRAM(s) Oral two times a day  docusate sodium 100 milliGRAM(s) Oral two times a day  ferrous    sulfate 325 milliGRAM(s) Oral every 8 hours  folic acid 1 milliGRAM(s) Oral daily  pantoprazole    Tablet 40 milliGRAM(s) Oral two times a day before meals  polyethylene glycol 3350 17 Gram(s) Oral at bedtime  predniSONE   Tablet 10 milliGRAM(s) Oral daily  traZODone 50 milliGRAM(s) Oral at bedtime  voriconazole 200 milliGRAM(s) Oral every 12 hours    MEDICATIONS  (PRN):  acetaminophen    Suspension 650 milliGRAM(s) Oral every 6 hours PRN For Temp greater than 38 C (100.4 F)  acetaminophen   Tablet. 650 milliGRAM(s) Oral every 6 hours PRN Mild Pain (1 - 3)  aluminum hydroxide/magnesium hydroxide/simethicone Suspension 30 milliLiter(s) Oral every 6 hours PRN Dyspepsia  temazepam 15 milliGRAM(s) Oral at bedtime PRN Insomnia    Vital Signs Last 24 Hrs  T(C): 36.8 (23 Oct 2017 12:06), Max: 37.3 (22 Oct 2017 16:13)  T(F): 98.2 (23 Oct 2017 12:06), Max: 99.1 (22 Oct 2017 16:13)  HR: 87 (23 Oct 2017 12:06) (67 - 92)  BP: 113/72 (23 Oct 2017 12:06) (111/65 - 127/83)  BP(mean): --  RR: 17 (23 Oct 2017 12:06) (17 - 18)  SpO2: 99% (23 Oct 2017 12:06) (97% - 100%)    I&O's Summary    22 Oct 2017 07:01  -  23 Oct 2017 07:00  --------------------------------------------------------  IN: 0 mL / OUT: 963 mL / NET: -963 mL          Physical Exam:   GENERAL: NAD, well-groomed, well-developed  HEENT: GIOVANI/   Atraumatic, Normocephalic  ENMT: No tonsillar erythema, exudates, or enlargement; Moist mucous membranes, Good dentition, No lesions  NECK: Supple, No JVD, Normal thyroid  CHEST/LUNG: Clear to auscultaion  CVS: Regular rate and rhythm; No murmurs, rubs, or gallops  GI: : Soft, Nontender, Nondistended; Bowel sounds present  NERVOUS SYSTEM:  Alert & Oriented X3  EXTREMITIES:  ankle  edema  LYMPH: No lymphadenopathy noted  SKIN: No rashes or lesions  ENDOCRINOLOGY: No Thyromegaly  PSYCH: Appropriate    Labs:                              10.3   9.92  )-----------( 77       ( 20 Oct 2017 05:45 )             33.1     10-20    141  |  101  |  17  ----------------------------<  85  4.3   |  29  |  0.88        CAPILLARY BLOOD GLUCOSE                Cultures:       < from: Xray Chest 1 View AP -PORTABLE-Routine (10.23.17 @ 07:14) >  EXAM:  RAD CHEST PORTABLE ROUTINE        PROCEDURE DATE:  Oct 23 2017         INTERPRETATION:  Clinical information: Follow-up.    Findings: Portable frontal view of the chest dated 10/20/2017 is compared   to 10/21/2017. Left chest tube is unchanged. Small left apical   pneumothorax is mildly improved. Perihilar scarring and opacity is are   stable. Cardiomediastinal silhouette cannot be evaluated. No other   changes are noted.    Impression: Mild improved small left apical pneumothorax.                  NURA GUERRERO M.D., ATTENDING RADIOLOGIST  This document has been electronically signed. Oct 23 2017  1:36PM    < end of copied text >                        Studies  Chest X-RAY  CT SCAN Chest   Venous Dopplers: LE:   CT Abdomen  Others

## 2017-10-23 NOTE — PROGRESS NOTE ADULT - PROBLEM SELECTOR PLAN 4
10/1: change to po prednisone 10 mg  10/02; cont po prednisone  10/3: stop prednisone after today,s dose?  10/6: OF PREDNISONE NOW  10/07: has sarcoidosis too: SOB with poor resp excursion: would start him on 20 mg of prednisone  10/8: cont prednisone with BD  10/09: his wheezing has increased today: Would change to IV METHYLPREDNISOLONE: 20 MG Q 8 HOURS  1/10was on 40 mg three times a day: dw icu attending , to decrease it to 20 q 8 hours  11/11: would change to po prednisone 40 mg daily  10/12: on steroids for now with slow taper  10/13: decrease prednisone to 30 mg tomorrow  10/14: prednisone 30 md every day : and then cut down to 20 mg  10/15: cont prednisone  10/16: on tapering steroids  10/17: no wheezing pt/ot  10/19: mild wheezing today : start duoneb ATC  10/20: give duoneb q 6 hours ATC  10/21: still with off and on wheezing: cont 20 mg of steroids  10/22: decrease steroids to 10 mg a day  10/23; tolerating 10 mg pretty well without wheezing

## 2017-10-24 LAB
BASE EXCESS BLDA CALC-SCNC: 1.9 MMOL/L — SIGNIFICANT CHANGE UP
HCO3 BLDA-SCNC: 25 MMOL/L — SIGNIFICANT CHANGE UP (ref 22–26)
HCT VFR BLD CALC: 40.4 % — SIGNIFICANT CHANGE UP (ref 39–50)
HGB BLD-MCNC: 12.5 G/DL — LOW (ref 13–17)
MCHC RBC-ENTMCNC: 30.9 % — LOW (ref 32–36)
MCHC RBC-ENTMCNC: 30.9 PG — SIGNIFICANT CHANGE UP (ref 27–34)
MCV RBC AUTO: 100 FL — SIGNIFICANT CHANGE UP (ref 80–100)
NRBC # FLD: 0 — SIGNIFICANT CHANGE UP
PCO2 BLDA: 60 MMHG — HIGH (ref 35–48)
PH BLDA: 7.29 PH — LOW (ref 7.35–7.45)
PLATELET # BLD AUTO: 81 K/UL — LOW (ref 150–400)
PMV BLD: 12.9 FL — SIGNIFICANT CHANGE UP (ref 7–13)
PO2 BLDA: 59 MMHG — LOW (ref 83–108)
RBC # BLD: 4.04 M/UL — LOW (ref 4.2–5.8)
RBC # FLD: 16.8 % — HIGH (ref 10.3–14.5)
SAO2 % BLDA: 88.6 % — LOW (ref 95–99)
WBC # BLD: 25.7 K/UL — HIGH (ref 3.8–10.5)
WBC # FLD AUTO: 25.7 K/UL — HIGH (ref 3.8–10.5)

## 2017-10-24 PROCEDURE — 71010: CPT | Mod: 26

## 2017-10-24 PROCEDURE — 71010: CPT | Mod: 26,77

## 2017-10-24 PROCEDURE — 93010 ELECTROCARDIOGRAM REPORT: CPT

## 2017-10-24 PROCEDURE — 93970 EXTREMITY STUDY: CPT | Mod: 26

## 2017-10-24 RX ORDER — METOPROLOL TARTRATE 50 MG
25 TABLET ORAL
Qty: 0 | Refills: 0 | Status: DISCONTINUED | OUTPATIENT
Start: 2017-10-24 | End: 2017-10-24

## 2017-10-24 RX ORDER — MIDODRINE HYDROCHLORIDE 2.5 MG/1
5 TABLET ORAL EVERY 8 HOURS
Qty: 0 | Refills: 0 | Status: DISCONTINUED | OUTPATIENT
Start: 2017-10-24 | End: 2017-10-29

## 2017-10-24 RX ORDER — HYDROMORPHONE HYDROCHLORIDE 2 MG/ML
1 INJECTION INTRAMUSCULAR; INTRAVENOUS; SUBCUTANEOUS ONCE
Qty: 0 | Refills: 0 | Status: DISCONTINUED | OUTPATIENT
Start: 2017-10-24 | End: 2017-10-25

## 2017-10-24 RX ORDER — MIDODRINE HYDROCHLORIDE 2.5 MG/1
5 TABLET ORAL EVERY 8 HOURS
Qty: 0 | Refills: 0 | Status: DISCONTINUED | OUTPATIENT
Start: 2017-10-24 | End: 2017-10-24

## 2017-10-24 RX ORDER — METOPROLOL TARTRATE 50 MG
12.5 TABLET ORAL EVERY 12 HOURS
Qty: 0 | Refills: 0 | Status: DISCONTINUED | OUTPATIENT
Start: 2017-10-24 | End: 2017-10-26

## 2017-10-24 RX ORDER — SODIUM CHLORIDE 9 MG/ML
1000 INJECTION INTRAMUSCULAR; INTRAVENOUS; SUBCUTANEOUS ONCE
Qty: 0 | Refills: 0 | Status: COMPLETED | OUTPATIENT
Start: 2017-10-24 | End: 2017-10-24

## 2017-10-24 RX ORDER — OXYCODONE HYDROCHLORIDE 5 MG/1
10 TABLET ORAL ONCE
Qty: 0 | Refills: 0 | Status: DISCONTINUED | OUTPATIENT
Start: 2017-10-24 | End: 2017-10-24

## 2017-10-24 RX ORDER — METOPROLOL TARTRATE 50 MG
12.5 TABLET ORAL
Qty: 0 | Refills: 0 | Status: DISCONTINUED | OUTPATIENT
Start: 2017-10-24 | End: 2017-10-24

## 2017-10-24 RX ORDER — FUROSEMIDE 40 MG
20 TABLET ORAL ONCE
Qty: 0 | Refills: 0 | Status: COMPLETED | OUTPATIENT
Start: 2017-10-24 | End: 2017-10-24

## 2017-10-24 RX ORDER — HYDROMORPHONE HYDROCHLORIDE 2 MG/ML
0.5 INJECTION INTRAMUSCULAR; INTRAVENOUS; SUBCUTANEOUS ONCE
Qty: 0 | Refills: 0 | Status: DISCONTINUED | OUTPATIENT
Start: 2017-10-24 | End: 2017-10-24

## 2017-10-24 RX ORDER — LEVALBUTEROL 1.25 MG/.5ML
0.63 SOLUTION, CONCENTRATE RESPIRATORY (INHALATION) EVERY 6 HOURS
Qty: 0 | Refills: 0 | Status: DISCONTINUED | OUTPATIENT
Start: 2017-10-24 | End: 2017-11-02

## 2017-10-24 RX ORDER — MORPHINE SULFATE 50 MG/1
2 CAPSULE, EXTENDED RELEASE ORAL ONCE
Qty: 0 | Refills: 0 | Status: DISCONTINUED | OUTPATIENT
Start: 2017-10-24 | End: 2017-10-24

## 2017-10-24 RX ORDER — ALBUMIN HUMAN 25 %
50 VIAL (ML) INTRAVENOUS ONCE
Qty: 0 | Refills: 0 | Status: COMPLETED | OUTPATIENT
Start: 2017-10-24 | End: 2017-10-24

## 2017-10-24 RX ORDER — ACETAMINOPHEN 500 MG
1000 TABLET ORAL ONCE
Qty: 0 | Refills: 0 | Status: COMPLETED | OUTPATIENT
Start: 2017-10-24 | End: 2017-10-24

## 2017-10-24 RX ADMIN — VORICONAZOLE 200 MILLIGRAM(S): 10 INJECTION, POWDER, LYOPHILIZED, FOR SOLUTION INTRAVENOUS at 17:50

## 2017-10-24 RX ADMIN — VORICONAZOLE 200 MILLIGRAM(S): 10 INJECTION, POWDER, LYOPHILIZED, FOR SOLUTION INTRAVENOUS at 05:53

## 2017-10-24 RX ADMIN — Medication 50 MILLIGRAM(S): at 21:48

## 2017-10-24 RX ADMIN — PANTOPRAZOLE SODIUM 40 MILLIGRAM(S): 20 TABLET, DELAYED RELEASE ORAL at 17:50

## 2017-10-24 RX ADMIN — Medication 325 MILLIGRAM(S): at 05:53

## 2017-10-24 RX ADMIN — Medication 10 MILLIGRAM(S): at 05:53

## 2017-10-24 RX ADMIN — Medication 650 MILLIGRAM(S): at 05:52

## 2017-10-24 RX ADMIN — SODIUM CHLORIDE 1000 MILLILITER(S): 9 INJECTION INTRAMUSCULAR; INTRAVENOUS; SUBCUTANEOUS at 05:53

## 2017-10-24 RX ADMIN — HYDROMORPHONE HYDROCHLORIDE 0.5 MILLIGRAM(S): 2 INJECTION INTRAMUSCULAR; INTRAVENOUS; SUBCUTANEOUS at 18:50

## 2017-10-24 RX ADMIN — OXYCODONE HYDROCHLORIDE 10 MILLIGRAM(S): 5 TABLET ORAL at 02:59

## 2017-10-24 RX ADMIN — Medication 12.5 MILLIGRAM(S): at 17:51

## 2017-10-24 RX ADMIN — Medication 20 MILLIGRAM(S): at 21:39

## 2017-10-24 RX ADMIN — Medication 3 MILLILITER(S): at 03:27

## 2017-10-24 RX ADMIN — BUDESONIDE AND FORMOTEROL FUMARATE DIHYDRATE 2 PUFF(S): 160; 4.5 AEROSOL RESPIRATORY (INHALATION) at 10:00

## 2017-10-24 RX ADMIN — BUDESONIDE AND FORMOTEROL FUMARATE DIHYDRATE 2 PUFF(S): 160; 4.5 AEROSOL RESPIRATORY (INHALATION) at 21:48

## 2017-10-24 RX ADMIN — HYDROMORPHONE HYDROCHLORIDE 0.5 MILLIGRAM(S): 2 INJECTION INTRAMUSCULAR; INTRAVENOUS; SUBCUTANEOUS at 18:23

## 2017-10-24 RX ADMIN — ATORVASTATIN CALCIUM 80 MILLIGRAM(S): 80 TABLET, FILM COATED ORAL at 21:45

## 2017-10-24 RX ADMIN — MIDODRINE HYDROCHLORIDE 5 MILLIGRAM(S): 2.5 TABLET ORAL at 12:27

## 2017-10-24 RX ADMIN — Medication 1000 MILLIGRAM(S): at 21:07

## 2017-10-24 RX ADMIN — OXYCODONE HYDROCHLORIDE 10 MILLIGRAM(S): 5 TABLET ORAL at 03:36

## 2017-10-24 RX ADMIN — Medication 650 MILLIGRAM(S): at 07:14

## 2017-10-24 RX ADMIN — APIXABAN 5 MILLIGRAM(S): 2.5 TABLET, FILM COATED ORAL at 05:53

## 2017-10-24 RX ADMIN — Medication 325 MILLIGRAM(S): at 21:46

## 2017-10-24 RX ADMIN — MORPHINE SULFATE 2 MILLIGRAM(S): 50 CAPSULE, EXTENDED RELEASE ORAL at 21:07

## 2017-10-24 RX ADMIN — Medication 50 MILLILITER(S): at 23:47

## 2017-10-24 RX ADMIN — Medication 100 MILLIGRAM(S): at 17:50

## 2017-10-24 RX ADMIN — Medication 1 MILLIGRAM(S): at 12:27

## 2017-10-24 RX ADMIN — LEVALBUTEROL 0.63 MILLIGRAM(S): 1.25 SOLUTION, CONCENTRATE RESPIRATORY (INHALATION) at 16:45

## 2017-10-24 RX ADMIN — Medication 12.5 MILLIGRAM(S): at 14:44

## 2017-10-24 RX ADMIN — PANTOPRAZOLE SODIUM 40 MILLIGRAM(S): 20 TABLET, DELAYED RELEASE ORAL at 05:53

## 2017-10-24 RX ADMIN — Medication 81 MILLIGRAM(S): at 12:26

## 2017-10-24 RX ADMIN — DIVALPROEX SODIUM 500 MILLIGRAM(S): 500 TABLET, DELAYED RELEASE ORAL at 17:51

## 2017-10-24 RX ADMIN — LEVALBUTEROL 0.63 MILLIGRAM(S): 1.25 SOLUTION, CONCENTRATE RESPIRATORY (INHALATION) at 22:24

## 2017-10-24 RX ADMIN — APIXABAN 5 MILLIGRAM(S): 2.5 TABLET, FILM COATED ORAL at 17:51

## 2017-10-24 RX ADMIN — MORPHINE SULFATE 2 MILLIGRAM(S): 50 CAPSULE, EXTENDED RELEASE ORAL at 20:17

## 2017-10-24 RX ADMIN — Medication 325 MILLIGRAM(S): at 12:29

## 2017-10-24 RX ADMIN — LEVALBUTEROL 0.63 MILLIGRAM(S): 1.25 SOLUTION, CONCENTRATE RESPIRATORY (INHALATION) at 10:33

## 2017-10-24 RX ADMIN — DIVALPROEX SODIUM 500 MILLIGRAM(S): 500 TABLET, DELAYED RELEASE ORAL at 05:53

## 2017-10-24 RX ADMIN — Medication 400 MILLIGRAM(S): at 20:04

## 2017-10-24 RX ADMIN — MIDODRINE HYDROCHLORIDE 5 MILLIGRAM(S): 2.5 TABLET ORAL at 21:46

## 2017-10-24 NOTE — PROGRESS NOTE ADULT - SUBJECTIVE AND OBJECTIVE BOX
Patient is a 47y old  Male who presents with a chief complaint of "My leg wouldn't stop shaking." (14 Sep 2017 09:59)  doing ol  s/p pleurodesis  no sob   some wheezing       Any change in ROS:     MEDICATIONS  (STANDING):  apixaban 5 milliGRAM(s) Oral every 12 hours  aspirin  chewable 81 milliGRAM(s) Oral daily  atorvastatin 80 milliGRAM(s) Oral at bedtime  buDESOnide 160 MICROgram(s)/formoterol 4.5 MICROgram(s) Inhaler 2 Puff(s) Inhalation every 12 hours  diVALproex  milliGRAM(s) Oral two times a day  docusate sodium 100 milliGRAM(s) Oral two times a day  ferrous    sulfate 325 milliGRAM(s) Oral every 8 hours  folic acid 1 milliGRAM(s) Oral daily  levalbuterol Inhalation 0.63 milliGRAM(s) Inhalation every 6 hours  metoprolol 12.5 milliGRAM(s) Oral two times a day  midodrine 5 milliGRAM(s) Oral every 8 hours  pantoprazole    Tablet 40 milliGRAM(s) Oral two times a day before meals  polyethylene glycol 3350 17 Gram(s) Oral at bedtime  predniSONE   Tablet 10 milliGRAM(s) Oral daily  traZODone 50 milliGRAM(s) Oral at bedtime  voriconazole 200 milliGRAM(s) Oral every 12 hours    MEDICATIONS  (PRN):  acetaminophen    Suspension 650 milliGRAM(s) Oral every 6 hours PRN For Temp greater than 38 C (100.4 F)  acetaminophen   Tablet. 650 milliGRAM(s) Oral every 6 hours PRN Mild Pain (1 - 3)  aluminum hydroxide/magnesium hydroxide/simethicone Suspension 30 milliLiter(s) Oral every 6 hours PRN Dyspepsia  temazepam 15 milliGRAM(s) Oral at bedtime PRN Insomnia    Vital Signs Last 24 Hrs  T(C): 36.7 (24 Oct 2017 14:31), Max: 37.4 (24 Oct 2017 05:26)  T(F): 98 (24 Oct 2017 14:31), Max: 99.3 (24 Oct 2017 05:26)  HR: 117 (24 Oct 2017 14:31) (82 - 142)  BP: 105/79 (24 Oct 2017 14:31) (79/44 - 128/71)  BP(mean): --  RR: 18 (24 Oct 2017 14:31) (18 - 18)  SpO2: 96% (24 Oct 2017 14:31) (95% - 100%)    I&O's Summary    23 Oct 2017 07:01  -  24 Oct 2017 07:00  --------------------------------------------------------  IN: 0 mL / OUT: 500 mL / NET: -500 mL          Physical Exam:   GENERAL: NAD, well-groomed, well-developed  HEENT: GIOVANI/   Atraumatic, Normocephalic  ENMT: No tonsillar erythema, exudates, or enlargement; Moist mucous membranes, Good dentition, No lesions  NECK: Supple, No JVD, Normal thyroid  CHEST/LUNG: mild wheezing  CVS: Regular rate and rhythm; No murmurs, rubs, or gallops  GI: : Soft, Nontender, Nondistended; Bowel sounds present  NERVOUS SYSTEM:  Alert & Oriented X3  EXTREMITIES:  2+ Peripheral Pulses, No clubbing, cyanosis, or edema  LYMPH: No lymphadenopathy noted  SKIN: No rashes or lesions  ENDOCRINOLOGY: No Thyromegaly  PSYCH: Appropriate    Labs:                              12.5   25.70 )-----------( 81       ( 24 Oct 2017 05:30 )             40.4                         12.2   16.97 )-----------( 79       ( 23 Oct 2017 22:15 )             39.0           CAPILLARY BLOOD GLUCOSE                Cultures:         < from: US Duplex Venous Lower Ext Complete, Bilateral (10.24.17 @ 11:42) >    TECHNIQUE: Duplex sonography of the BILATERAL LOWER extremities with   color and spectral Doppler, with and without compression.      FINDINGS:    There is normal compressibility of the bilateral common femoral, femoral   and popliteal veins. No calf vein thrombosis is detected.    Doppler examination shows normal spontaneous and phasic flow.    IMPRESSION:     No evidence of bilateral lower extremity deep venous thrombosis.        < from: Xray Chest 1 View AP -PORTABLE-Routine (10.24.17 @ 07:19) >  TECHNIQUE:   AP Portable chest x-ray.    INTERPRETATION:     The heart is not enlarged.  Bilateral lung opacities are not significantly changed.  Left lung postsurgical change again noted.  A left pleural pigtail catheter is unchanged in position.  The previous right apical pneumothorax now appears more dense suggesting   that fluid now occupies this region. The previous small loculated left   pleural effusion seen at the base is unchanged.            IMPRESSION:  Stable bilateral lung opacities.    Previous right apical pneumothorax now appears more dense suggesting that   pleural fluid now occupies this region. Left basilar loculated pleural   effusion is not significantly changed.    < end of copied text >          RUBEN GU M.D., RADIOLOGY RESIDENT  This document has been electronically signed.  YOLY CARBAJAL M.D., ATTENDING RADIOLOGIST  This document has been electronically signed. Oct 24 2017 11:57AM        < end of copied text >                      Studies  Chest X-RAY  CT SCAN Chest   Venous Dopplers: LE:   CT Abdomen  Others

## 2017-10-24 NOTE — PROGRESS NOTE ADULT - PROBLEM SELECTOR PLAN 4
10/1: change to po prednisone 10 mg  10/02; cont po prednisone  10/3: stop prednisone after today,s dose?  10/6: OF PREDNISONE NOW  10/07: has sarcoidosis too: SOB with poor resp excursion: would start him on 20 mg of prednisone  10/8: cont prednisone with BD  10/09: his wheezing has increased today: Would change to IV METHYLPREDNISOLONE: 20 MG Q 8 HOURS  1/10was on 40 mg three times a day: dw icu attending , to decrease it to 20 q 8 hours  11/11: would change to po prednisone 40 mg daily  10/12: on steroids for now with slow taper  10/13: decrease prednisone to 30 mg tomorrow  10/14: prednisone 30 md every day : and then cut down to 20 mg  10/15: cont prednisone  10/16: on tapering steroids  10/17: no wheezing pt/ot  10/19: mild wheezing today : start duoneb ATC  10/20: give duoneb q 6 hours ATC  10/21: still with off and on wheezing: cont 20 mg of steroids  10/22: decrease steroids to 10 mg a day

## 2017-10-24 NOTE — PROVIDER CONTACT NOTE (OTHER) - ACTION/TREATMENT ORDERED:
PA at bedside. Patient transferred to ICU
bolus of normal saline
will continue to monitor, team aware.
Ordered NS 50ml/hr, Will come to assess.
continue to monitor patient.

## 2017-10-24 NOTE — PROGRESS NOTE ADULT - PROBLEM SELECTOR PLAN 1
s/p blood patch, but still with air leak: defer to thoracici surgery  10/22: increased on yesterdays chest -ray: failed blood patch.....next step? pleurodesis  10/24: s/p pleurodesis: air leaking:

## 2017-10-24 NOTE — PROGRESS NOTE ADULT - PROBLEM SELECTOR PLAN 2
pt has been having slowly decreasing platelets: he is on eliquis too: no bleeding. defer to primary team  10/22: please recheck his platelets  10/24: 81: stable: no bleeding

## 2017-10-24 NOTE — PROGRESS NOTE ADULT - SUBJECTIVE AND OBJECTIVE BOX
Subjective: Febrile overnight after talc pleurodesis; pain with coughing    Vital Signs:  Vital Signs Last 24 Hrs  T(C): 36.7 (10-24-17 @ 08:10), Max: 37.4 (10-24-17 @ 05:26)  T(F): 98 (10-24-17 @ 08:10), Max: 99.3 (10-24-17 @ 05:26)  HR: 122 (10-24-17 @ 10:33) (82 - 142)  BP: 94/63 (10-24-17 @ 08:10) (79/44 - 128/71)  RR: 18 (10-24-17 @ 08:10) (17 - 18)  SpO2: 96% (10-24-17 @ 10:33) (95% - 100%) on (O2)    Telemetry/Alarms:  General: WN/WD NAD  Neurology: Awake, nonfocal, BETH x 4  Eyes: Scleras clear, PERRLA/ EOMI, Gross vision intact  ENT:Gross hearing intact, grossly patent pharynx, no stridor  Neck: Neck supple, trachea midline, No JVD,   Respiratory: CTA B/L, No wheezing, rales, rhonchi  CV: RRR, S1S2, no murmurs, rubs or gallops  Abdominal: Soft, NT, ND +BS,   Extremities: edematous, + peripheral pulses  Skin: No Rashes, Hematoma, Ecchymosis  Lymphatic: No Neck, axilla, groin LAD  Psych: Oriented x 3, normal affect  Incisions: c,d,i  Tubes: LPTC suction, expiratory air leak    Relevant labs, radiology and Medications reviewed                        12.5   25.70 )-----------( 81       ( 24 Oct 2017 05:30 )             40.4             MEDICATIONS  (STANDING):  apixaban 5 milliGRAM(s) Oral every 12 hours  aspirin  chewable 81 milliGRAM(s) Oral daily  atorvastatin 80 milliGRAM(s) Oral at bedtime  buDESOnide 160 MICROgram(s)/formoterol 4.5 MICROgram(s) Inhaler 2 Puff(s) Inhalation every 12 hours  diVALproex  milliGRAM(s) Oral two times a day  docusate sodium 100 milliGRAM(s) Oral two times a day  ferrous    sulfate 325 milliGRAM(s) Oral every 8 hours  folic acid 1 milliGRAM(s) Oral daily  levalbuterol Inhalation 0.63 milliGRAM(s) Inhalation every 6 hours  midodrine 5 milliGRAM(s) Oral every 8 hours  pantoprazole    Tablet 40 milliGRAM(s) Oral two times a day before meals  polyethylene glycol 3350 17 Gram(s) Oral at bedtime  predniSONE   Tablet 10 milliGRAM(s) Oral daily  traZODone 50 milliGRAM(s) Oral at bedtime  voriconazole 200 milliGRAM(s) Oral every 12 hours    MEDICATIONS  (PRN):  acetaminophen    Suspension 650 milliGRAM(s) Oral every 6 hours PRN For Temp greater than 38 C (100.4 F)  acetaminophen   Tablet. 650 milliGRAM(s) Oral every 6 hours PRN Mild Pain (1 - 3)  aluminum hydroxide/magnesium hydroxide/simethicone Suspension 30 milliLiter(s) Oral every 6 hours PRN Dyspepsia  temazepam 15 milliGRAM(s) Oral at bedtime PRN Insomnia    Pertinent Physical Exam  I&O's Summary    23 Oct 2017 07:01  -  24 Oct 2017 07:00  --------------------------------------------------------  IN: 0 mL / OUT: 500 mL / NET: -500 mL        Assessment  47y Male  w/ PAST MEDICAL & SURGICAL HISTORY:  History of pneumothorax: History of 3 prior pneumonthoracies.  COPD (chronic obstructive pulmonary disease)  Asthma  Hypertension  Sarcoidosis      9/24/17 patient underwent Thoracotomy, DAVID lobectomy; reop for hemostasis x2  . Postoperative course/issues: L PTC placed by IR for PTX and persistent air leak despite blood patch and pleurodesis    PLAN  Neuro: Pain management  Pulm: Encourage coughing, deep breathing and use of incentive spirometry. Wean off supplemental oxygen as able. Daily CXR.   Cardio: Monitor telemetry/alarms  GI: Tolerating diet. Continue stool softeners.  Renal: monitor urine output, supplement electrolytes as needed  Vasc: Heparin SC/SCDs for DVT prophylaxis  Heme: Stable H/H. .   ID: Off antibiotics. Stable.  Therapy: OOB/ambulate  Tubes: Monitor Chest tube output; continue PTC to suction monitoring air leak  Disposition: Aim to D/C to rehab once air leak resolves  Discussed with Cardiothoracic Team at AM rounds. Subjective: Febrile overnight after talc pleurodesis; pain with coughing    Vital Signs:  Vital Signs Last 24 Hrs  T(C): 36.7 (10-24-17 @ 08:10), Max: 37.4 (10-24-17 @ 05:26)  T(F): 98 (10-24-17 @ 08:10), Max: 99.3 (10-24-17 @ 05:26)  HR: 122 (10-24-17 @ 10:33) (82 - 142)  BP: 94/63 (10-24-17 @ 08:10) (79/44 - 128/71)  RR: 18 (10-24-17 @ 08:10) (17 - 18)  SpO2: 96% (10-24-17 @ 10:33) (95% - 100%) on (O2)    Telemetry/Alarms:  General: WN/WD NAD  Neurology: Awake, nonfocal, BETH x 4  Eyes: Scleras clear, PERRLA/ EOMI, Gross vision intact  ENT:Gross hearing intact, grossly patent pharynx, no stridor  Neck: Neck supple, trachea midline, No JVD,   Respiratory: CTA B/L, No wheezing, rales, rhonchi  CV: RRR, S1S2, no murmurs, rubs or gallops  Abdominal: Soft, NT, ND +BS,   Extremities: edematous, + peripheral pulses  Skin: No Rashes, Hematoma, Ecchymosis  Lymphatic: No Neck, axilla, groin LAD  Psych: Oriented x 3, normal affect  Incisions: c,d,i  Tubes: LPTC suction, expiratory air leak    Relevant labs, radiology and Medications reviewed                        12.5   25.70 )-----------( 81       ( 24 Oct 2017 05:30 )             40.4             MEDICATIONS  (STANDING):  apixaban 5 milliGRAM(s) Oral every 12 hours  aspirin  chewable 81 milliGRAM(s) Oral daily  atorvastatin 80 milliGRAM(s) Oral at bedtime  buDESOnide 160 MICROgram(s)/formoterol 4.5 MICROgram(s) Inhaler 2 Puff(s) Inhalation every 12 hours  diVALproex  milliGRAM(s) Oral two times a day  docusate sodium 100 milliGRAM(s) Oral two times a day  ferrous    sulfate 325 milliGRAM(s) Oral every 8 hours  folic acid 1 milliGRAM(s) Oral daily  levalbuterol Inhalation 0.63 milliGRAM(s) Inhalation every 6 hours  midodrine 5 milliGRAM(s) Oral every 8 hours  pantoprazole    Tablet 40 milliGRAM(s) Oral two times a day before meals  polyethylene glycol 3350 17 Gram(s) Oral at bedtime  predniSONE   Tablet 10 milliGRAM(s) Oral daily  traZODone 50 milliGRAM(s) Oral at bedtime  voriconazole 200 milliGRAM(s) Oral every 12 hours    MEDICATIONS  (PRN):  acetaminophen    Suspension 650 milliGRAM(s) Oral every 6 hours PRN For Temp greater than 38 C (100.4 F)  acetaminophen   Tablet. 650 milliGRAM(s) Oral every 6 hours PRN Mild Pain (1 - 3)  aluminum hydroxide/magnesium hydroxide/simethicone Suspension 30 milliLiter(s) Oral every 6 hours PRN Dyspepsia  temazepam 15 milliGRAM(s) Oral at bedtime PRN Insomnia    Pertinent Physical Exam  I&O's Summary    23 Oct 2017 07:01  -  24 Oct 2017 07:00  --------------------------------------------------------  IN: 0 mL / OUT: 500 mL / NET: -500 mL        Assessment  47y Male  w/ PAST MEDICAL & SURGICAL HISTORY:  History of pneumothorax: History of 3 prior pneumonthoracies.  COPD (chronic obstructive pulmonary disease)  Asthma  Hypertension  Sarcoidosis      9/24/17 patient underwent Thoracotomy, DAVID lobectomy; reop for hemostasis x2  . Postoperative course/issues: L PTC placed by IR for PTX and persistent air leak despite blood patch and pleurodesis    PLAN  Neuro: Pain management  Pulm: Encourage coughing, deep breathing and use of incentive spirometry. Wean off supplemental oxygen as able. Daily CXR.   Cardio: Monitor telemetry/alarms; normal saline for hypotension; midodrine q8h; will reassess and consider beta blocker for tachycardia  GI: Tolerating diet. Continue stool softeners.  Renal: monitor urine output, supplement electrolytes as needed  Vasc: Heparin SC/SCDs for DVT prophylaxis  Heme: Stable H/H. .   ID: Off antibiotics. Stable.  Therapy: OOB/ambulate  Tubes: Monitor Chest tube output; continue PTC to suction monitoring air leak  Disposition: Aim to D/C to rehab once air leak resolves  Discussed with Cardiothoracic Team at AM rounds.

## 2017-10-24 NOTE — PROVIDER CONTACT NOTE (OTHER) - ASSESSMENT
patient having difficulty breathing
pts bp was 79/44, re checked twice in both arms
HR- 142, Blood pressure 94/63
pt A&OX4. Vs stable. pt unable to describe left side of picture from NIHSS. pt also only reading 50% of words on NIHSS assessment. pt states he sometimes cannot see entire phrases on the TV. pt states this has been happening since he had the stroke in the hospital. pt is in no acute distress or decline. no acute neurological changes noted.
pt denies palpations or discomfort, no fever noted.

## 2017-10-24 NOTE — PROVIDER CONTACT NOTE (OTHER) - RECOMMENDATIONS
PA to assess patient
gave bolus will continue to monitor and re check bp
Administer fluids
continue to monitor patient. pass information to neurological team in hospital

## 2017-10-24 NOTE — PROVIDER CONTACT NOTE (OTHER) - REASON
Pt tachycardiac
Tachycardia, Low BP
hypotensive
patient appears to have hemianopia
Difficulty breathing

## 2017-10-24 NOTE — PROGRESS NOTE ADULT - PROBLEM SELECTOR PLAN 3
10/06: stable?: no HEMOPTYSIS  10/7: resolved  10/8: cont prednisone: pneumothorax has resolved  10/09: resolved  10/10: no more hemoptysis  1/11: resolved after surgery and despite eliquis: stable  11/12: stable: s./p surgery: has mod pneumothorax: on the elft side: would defer to ct surgery: back on suction!  10/13: doing   ok  10/14: decrease dose to 30 mg for a few days and then reduce it to 20 mg: increase ptx today on chest radiograph: dw Dr Caldera  10/15: resolved: ptx on the left side is better today on the chest radiogrpah  10/16: small tiny left ptx: defer to thoracici  10/17: resolved  10/20: no more hemoptysis  10/18: doing ok  10/19: no more hemoptysis  10/21: no hemoptysis,  10/22: resolved  10/24: resolved

## 2017-10-24 NOTE — PROGRESS NOTE ADULT - PROBLEM SELECTOR PLAN 6
10/4 stop prednisone tomorrow  10/5: dc prednisone today : pt was not on any treatment for sarcoidosis before  10/06: OFF PREDNISONE NOW  10/7: start prednisone 20 mg per day  10/09: started back on IV steroids given his increased wheezing!!  10/10: wheezing better: decrease steroids to 20 mg q 8  10//: decrease and change prednisone to 40 mg daily: probably will be discharged on some prednisone  10/13: decrease prednisone to 30 mg tomorrow  10/15: on 30 mg of prednisone  10/16: on tapering steroids  10/17: stable: cont prednisone , will probably be dced on 20 mg of prednisone chan  10/17: stable   10/18: stable  10/19: cont steroids fro now  10/20: on 20 mg of prednisone for now  10/21: stable  10/22: decrease prednisone to 10 mg a day :he has not been wheezing today  10/24: stable

## 2017-10-25 LAB
BASE EXCESS BLDA CALC-SCNC: 3.3 MMOL/L — SIGNIFICANT CHANGE UP
BUN SERPL-MCNC: 28 MG/DL — HIGH (ref 7–23)
BUN SERPL-MCNC: 29 MG/DL — HIGH (ref 7–23)
CALCIUM SERPL-MCNC: 8.5 MG/DL — SIGNIFICANT CHANGE UP (ref 8.4–10.5)
CALCIUM SERPL-MCNC: 8.6 MG/DL — SIGNIFICANT CHANGE UP (ref 8.4–10.5)
CHLORIDE SERPL-SCNC: 98 MMOL/L — SIGNIFICANT CHANGE UP (ref 98–107)
CHLORIDE SERPL-SCNC: 99 MMOL/L — SIGNIFICANT CHANGE UP (ref 98–107)
CO2 SERPL-SCNC: 27 MMOL/L — SIGNIFICANT CHANGE UP (ref 22–31)
CO2 SERPL-SCNC: 27 MMOL/L — SIGNIFICANT CHANGE UP (ref 22–31)
CREAT SERPL-MCNC: 1.24 MG/DL — SIGNIFICANT CHANGE UP (ref 0.5–1.3)
CREAT SERPL-MCNC: 1.68 MG/DL — HIGH (ref 0.5–1.3)
GLUCOSE BLDA-MCNC: 127 MG/DL — HIGH (ref 70–99)
GLUCOSE SERPL-MCNC: 110 MG/DL — HIGH (ref 70–99)
GLUCOSE SERPL-MCNC: 80 MG/DL — SIGNIFICANT CHANGE UP (ref 70–99)
HCO3 BLDA-SCNC: 26 MMOL/L — SIGNIFICANT CHANGE UP (ref 22–26)
HCT VFR BLD CALC: 35.9 % — LOW (ref 39–50)
HCT VFR BLD CALC: 36.3 % — LOW (ref 39–50)
HCT VFR BLDA CALC: 36.1 % — LOW (ref 39–51)
HGB BLD-MCNC: 11 G/DL — LOW (ref 13–17)
HGB BLD-MCNC: 11.3 G/DL — LOW (ref 13–17)
HGB BLDA-MCNC: 11.7 G/DL — LOW (ref 13–17)
MCHC RBC-ENTMCNC: 30.6 % — LOW (ref 32–36)
MCHC RBC-ENTMCNC: 30.9 PG — SIGNIFICANT CHANGE UP (ref 27–34)
MCHC RBC-ENTMCNC: 31.1 % — LOW (ref 32–36)
MCHC RBC-ENTMCNC: 31.3 PG — SIGNIFICANT CHANGE UP (ref 27–34)
MCV RBC AUTO: 100.6 FL — HIGH (ref 80–100)
MCV RBC AUTO: 100.8 FL — HIGH (ref 80–100)
NRBC # FLD: 0 — SIGNIFICANT CHANGE UP
NRBC # FLD: 0 — SIGNIFICANT CHANGE UP
PCO2 BLDA: 61 MMHG — HIGH (ref 35–48)
PH BLDA: 7.3 PH — LOW (ref 7.35–7.45)
PLATELET # BLD AUTO: 56 K/UL — LOW (ref 150–400)
PLATELET # BLD AUTO: 57 K/UL — LOW (ref 150–400)
PMV BLD: 13.2 FL — HIGH (ref 7–13)
PMV BLD: 13.6 FL — HIGH (ref 7–13)
PO2 BLDA: 158 MMHG — HIGH (ref 83–108)
POTASSIUM BLDA-SCNC: 5 MMOL/L — HIGH (ref 3.4–4.5)
POTASSIUM SERPL-MCNC: 5.2 MMOL/L — SIGNIFICANT CHANGE UP (ref 3.5–5.3)
POTASSIUM SERPL-MCNC: 5.3 MMOL/L — SIGNIFICANT CHANGE UP (ref 3.5–5.3)
POTASSIUM SERPL-SCNC: 5.2 MMOL/L — SIGNIFICANT CHANGE UP (ref 3.5–5.3)
POTASSIUM SERPL-SCNC: 5.3 MMOL/L — SIGNIFICANT CHANGE UP (ref 3.5–5.3)
RBC # BLD: 3.56 M/UL — LOW (ref 4.2–5.8)
RBC # BLD: 3.61 M/UL — LOW (ref 4.2–5.8)
RBC # FLD: 15.9 % — HIGH (ref 10.3–14.5)
RBC # FLD: 16 % — HIGH (ref 10.3–14.5)
SAO2 % BLDA: 99.8 % — HIGH (ref 95–99)
SODIUM BLDA-SCNC: 134 MMOL/L — LOW (ref 136–146)
SODIUM SERPL-SCNC: 135 MMOL/L — SIGNIFICANT CHANGE UP (ref 135–145)
SODIUM SERPL-SCNC: 139 MMOL/L — SIGNIFICANT CHANGE UP (ref 135–145)
WBC # BLD: 21.4 K/UL — HIGH (ref 3.8–10.5)
WBC # BLD: 21.93 K/UL — HIGH (ref 3.8–10.5)
WBC # FLD AUTO: 21.4 K/UL — HIGH (ref 3.8–10.5)
WBC # FLD AUTO: 21.93 K/UL — HIGH (ref 3.8–10.5)

## 2017-10-25 PROCEDURE — 71010: CPT | Mod: 26

## 2017-10-25 PROCEDURE — 71010: CPT | Mod: 26,77

## 2017-10-25 PROCEDURE — 99291 CRITICAL CARE FIRST HOUR: CPT

## 2017-10-25 RX ORDER — HYDROMORPHONE HYDROCHLORIDE 2 MG/ML
1 INJECTION INTRAMUSCULAR; INTRAVENOUS; SUBCUTANEOUS EVERY 4 HOURS
Qty: 0 | Refills: 0 | Status: DISCONTINUED | OUTPATIENT
Start: 2017-10-25 | End: 2017-10-31

## 2017-10-25 RX ORDER — ACETAMINOPHEN 500 MG
1000 TABLET ORAL ONCE
Qty: 0 | Refills: 0 | Status: COMPLETED | OUTPATIENT
Start: 2017-10-25 | End: 2017-10-25

## 2017-10-25 RX ORDER — FUROSEMIDE 40 MG
10 TABLET ORAL ONCE
Qty: 0 | Refills: 0 | Status: COMPLETED | OUTPATIENT
Start: 2017-10-25 | End: 2017-10-25

## 2017-10-25 RX ORDER — ALPRAZOLAM 0.25 MG
0.25 TABLET ORAL EVERY 8 HOURS
Qty: 0 | Refills: 0 | Status: DISCONTINUED | OUTPATIENT
Start: 2017-10-25 | End: 2017-11-01

## 2017-10-25 RX ORDER — PHENYLEPHRINE HYDROCHLORIDE 10 MG/ML
0.3 INJECTION INTRAVENOUS
Qty: 80 | Refills: 0 | Status: DISCONTINUED | OUTPATIENT
Start: 2017-10-25 | End: 2017-10-28

## 2017-10-25 RX ORDER — IPRATROPIUM BROMIDE 0.2 MG/ML
500 SOLUTION, NON-ORAL INHALATION EVERY 6 HOURS
Qty: 0 | Refills: 0 | Status: DISCONTINUED | OUTPATIENT
Start: 2017-10-25 | End: 2017-11-02

## 2017-10-25 RX ADMIN — DIVALPROEX SODIUM 500 MILLIGRAM(S): 500 TABLET, DELAYED RELEASE ORAL at 18:02

## 2017-10-25 RX ADMIN — APIXABAN 5 MILLIGRAM(S): 2.5 TABLET, FILM COATED ORAL at 06:29

## 2017-10-25 RX ADMIN — APIXABAN 5 MILLIGRAM(S): 2.5 TABLET, FILM COATED ORAL at 18:03

## 2017-10-25 RX ADMIN — PHENYLEPHRINE HYDROCHLORIDE 10.41 MICROGRAM(S)/KG/MIN: 10 INJECTION INTRAVENOUS at 22:21

## 2017-10-25 RX ADMIN — LEVALBUTEROL 0.63 MILLIGRAM(S): 1.25 SOLUTION, CONCENTRATE RESPIRATORY (INHALATION) at 09:26

## 2017-10-25 RX ADMIN — Medication 325 MILLIGRAM(S): at 06:28

## 2017-10-25 RX ADMIN — MIDODRINE HYDROCHLORIDE 5 MILLIGRAM(S): 2.5 TABLET ORAL at 14:07

## 2017-10-25 RX ADMIN — Medication 650 MILLIGRAM(S): at 12:21

## 2017-10-25 RX ADMIN — DIVALPROEX SODIUM 500 MILLIGRAM(S): 500 TABLET, DELAYED RELEASE ORAL at 06:28

## 2017-10-25 RX ADMIN — PANTOPRAZOLE SODIUM 40 MILLIGRAM(S): 20 TABLET, DELAYED RELEASE ORAL at 06:28

## 2017-10-25 RX ADMIN — Medication 500 MICROGRAM(S): at 19:01

## 2017-10-25 RX ADMIN — Medication 10 MILLIGRAM(S): at 19:02

## 2017-10-25 RX ADMIN — Medication 1000 MILLIGRAM(S): at 22:35

## 2017-10-25 RX ADMIN — Medication 81 MILLIGRAM(S): at 14:07

## 2017-10-25 RX ADMIN — ATORVASTATIN CALCIUM 80 MILLIGRAM(S): 80 TABLET, FILM COATED ORAL at 22:21

## 2017-10-25 RX ADMIN — VORICONAZOLE 200 MILLIGRAM(S): 10 INJECTION, POWDER, LYOPHILIZED, FOR SOLUTION INTRAVENOUS at 18:02

## 2017-10-25 RX ADMIN — Medication 1 MILLIGRAM(S): at 14:07

## 2017-10-25 RX ADMIN — BUDESONIDE AND FORMOTEROL FUMARATE DIHYDRATE 2 PUFF(S): 160; 4.5 AEROSOL RESPIRATORY (INHALATION) at 20:17

## 2017-10-25 RX ADMIN — LEVALBUTEROL 0.63 MILLIGRAM(S): 1.25 SOLUTION, CONCENTRATE RESPIRATORY (INHALATION) at 04:18

## 2017-10-25 RX ADMIN — Medication 10 MILLIGRAM(S): at 06:28

## 2017-10-25 RX ADMIN — Medication 0.25 MILLIGRAM(S): at 22:21

## 2017-10-25 RX ADMIN — HYDROMORPHONE HYDROCHLORIDE 1 MILLIGRAM(S): 2 INJECTION INTRAMUSCULAR; INTRAVENOUS; SUBCUTANEOUS at 01:29

## 2017-10-25 RX ADMIN — HYDROMORPHONE HYDROCHLORIDE 1 MILLIGRAM(S): 2 INJECTION INTRAMUSCULAR; INTRAVENOUS; SUBCUTANEOUS at 01:59

## 2017-10-25 RX ADMIN — Medication 500 MICROGRAM(S): at 22:15

## 2017-10-25 RX ADMIN — Medication 12.5 MILLIGRAM(S): at 04:27

## 2017-10-25 RX ADMIN — Medication 650 MILLIGRAM(S): at 13:00

## 2017-10-25 RX ADMIN — Medication 325 MILLIGRAM(S): at 14:09

## 2017-10-25 RX ADMIN — Medication 400 MILLIGRAM(S): at 06:28

## 2017-10-25 RX ADMIN — Medication 12.5 MILLIGRAM(S): at 18:03

## 2017-10-25 RX ADMIN — MIDODRINE HYDROCHLORIDE 5 MILLIGRAM(S): 2.5 TABLET ORAL at 22:29

## 2017-10-25 RX ADMIN — PANTOPRAZOLE SODIUM 40 MILLIGRAM(S): 20 TABLET, DELAYED RELEASE ORAL at 18:02

## 2017-10-25 RX ADMIN — Medication 400 MILLIGRAM(S): at 22:21

## 2017-10-25 RX ADMIN — Medication 1000 MILLIGRAM(S): at 06:58

## 2017-10-25 RX ADMIN — VORICONAZOLE 200 MILLIGRAM(S): 10 INJECTION, POWDER, LYOPHILIZED, FOR SOLUTION INTRAVENOUS at 06:29

## 2017-10-25 RX ADMIN — Medication 100 MILLIGRAM(S): at 18:01

## 2017-10-25 RX ADMIN — Medication 325 MILLIGRAM(S): at 22:21

## 2017-10-25 RX ADMIN — Medication 0.25 MILLIGRAM(S): at 14:07

## 2017-10-25 RX ADMIN — LEVALBUTEROL 0.63 MILLIGRAM(S): 1.25 SOLUTION, CONCENTRATE RESPIRATORY (INHALATION) at 22:15

## 2017-10-25 RX ADMIN — Medication 100 MILLIGRAM(S): at 18:04

## 2017-10-25 RX ADMIN — Medication 0.25 MILLIGRAM(S): at 02:15

## 2017-10-25 RX ADMIN — LEVALBUTEROL 0.63 MILLIGRAM(S): 1.25 SOLUTION, CONCENTRATE RESPIRATORY (INHALATION) at 16:13

## 2017-10-25 RX ADMIN — Medication 100 MILLIGRAM(S): at 06:28

## 2017-10-25 RX ADMIN — Medication 40 MILLIGRAM(S): at 22:21

## 2017-10-25 NOTE — PROGRESS NOTE ADULT - PROBLEM SELECTOR PLAN 9
supportive care: may be the reason for this wheezing exacerbation

## 2017-10-25 NOTE — PROGRESS NOTE ADULT - PROBLEM SELECTOR PLAN 1
s/p blood patch, but still with air leak: defer to thoracici surgery  10/22: increased on yesterdays chest -ray: failed blood patch.....next step? pleurodesis  10/24: s/p pleurodesis: air leaking:  10/25: in the afternoon he became acutely hypercarbic: transfered down to cticu: will need bipap help: plus also he is febrile? talc reaction: have a low threshold to satrting antibiotcs

## 2017-10-25 NOTE — PROGRESS NOTE ADULT - SUBJECTIVE AND OBJECTIVE BOX
OSCAR CHACKO            MRN-[id number]         No Known Allergies             47 M PMH of sarcoidosis, HTN, COPD, and asthma who was initially admitted on 09/10 w cc of severe leg spasms X two days  In E.D. pt found to be wheezing, so he was seen by Pulmonary and started on IV Steroids for sarcoidosis  On 9/12 at 4 am, a RRT and Stroke codes were called as the patient became unresponsive.  He was transferred to MICU and then intubated for airway protection.   The patient was found to have a new infarct on MRI and also found to have a patent PFO on this admission as well. He was weaned off vent on 09/13 and transferred to the floors   There was concern patient was having paroxysmal afib, so he was started on Eliquis yesterday evening. He has received 2 doses of Eliquis on 09/19.   However, on 09/20, the patient had a coughing episode, which was not unusual for him, but during this episode he began to cough up fresh blood, about 1/2 cup of blood. He underwent Bronchoscopy with bronchoalveolar lavage of both sides and epi injection on 09/21 and was to have received further IR embolization for hemoptysis control in the next few days.  However, he developed hemoptysis again and underwent urgent LULectomy, complicated with post op bleeding.    Left VATS and DAVID lobectomy 9/24/17 [POD # 18]  Re-op L thoracotomy, evacuation of hemothorax (600cc clot, 400cc blood) no obvious site of bleeding  9/25/17 [POD # 17]  Re-op L thoracotomy, evacuation of hemothorax; RTOR x2 100cc clot; 200cc blood: bleeding posterior intercostal arteries identified and cauterized 9/25/17 [POD # 17]  Pt was transferred to ICU because of increased SOB on the floor. Pt also had increased left apical pneumo for which I.R put a   Pigtail catheter. [POD # 6 (312710)] (Transferred back to ICU 2/2 increased SOB on telemetry and increased left apical ptx)    10/25/17  Pt was brought to ICU for acute SOB / acute renal failure.     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  COPD (chronic obstructive pulmonary disease)  Asthma  Hypertension  Sarcoidosis  No significant past surgical history      ICU Vital Signs Last 24 Hrs  T(C): 37.4 (25 Oct 2017 16:45), Max: 37.4 (25 Oct 2017 16:45)  T(F): 99.3 (25 Oct 2017 16:45), Max: 99.3 (25 Oct 2017 16:45)  HR: 118 (25 Oct 2017 16:45) (92 - 139)  BP: 103/67 (25 Oct 2017 16:45) (85/82 - 147/60)  BP(mean): 75 (25 Oct 2017 16:45) (75 - 75)  ABP: --  ABP(mean): --  RR: 19 (25 Oct 2017 16:45) (18 - 32)  SpO2: 98% (25 Oct 2017 16:45) (95% - 99%)    I&O's Summary    24 Oct 2017 07:01  -  25 Oct 2017 07:00  --------------------------------------------------------  IN: 0 mL / OUT: 820 mL / NET: -820 mL    25 Oct 2017 07:01  -  25 Oct 2017 17:08  --------------------------------------------------------  IN: 0 mL / OUT: 75 mL / NET: -75 mL      CAPILLARY BLOOD GLUCOSE      MEDICATIONS  (STANDING):  ALPRAZolam 0.25 milliGRAM(s) Oral every 8 hours  apixaban 5 milliGRAM(s) Oral every 12 hours  atorvastatin 80 milliGRAM(s) Oral at bedtime  buDESOnide 160 MICROgram(s)/formoterol 4.5 MICROgram(s) Inhaler 2 Puff(s) Inhalation every 12 hours  diVALproex  milliGRAM(s) Oral two times a day  docusate sodium 100 milliGRAM(s) Oral two times a day  ferrous    sulfate 325 milliGRAM(s) Oral every 8 hours  folic acid 1 milliGRAM(s) Oral daily  levalbuterol Inhalation 0.63 milliGRAM(s) Inhalation every 6 hours  methylPREDNISolone sodium succinate Injectable 40 milliGRAM(s) IV Push every 8 hours  methylPREDNISolone sodium succinate Injectable 100 milliGRAM(s) IV Push once  metoprolol     tartrate 12.5 milliGRAM(s) Oral every 12 hours  midodrine 5 milliGRAM(s) Oral every 8 hours  pantoprazole    Tablet 40 milliGRAM(s) Oral two times a day before meals  polyethylene glycol 3350 17 Gram(s) Oral at bedtime  voriconazole 200 milliGRAM(s) Oral every 12 hours    MEDICATIONS  (PRN):  acetaminophen    Suspension 650 milliGRAM(s) Oral every 6 hours PRN For Temp greater than 38 C (100.4 F)  acetaminophen   Tablet. 650 milliGRAM(s) Oral every 6 hours PRN Mild Pain (1 - 3)  aluminum hydroxide/magnesium hydroxide/simethicone Suspension 30 milliLiter(s) Oral every 6 hours PRN Dyspepsia  HYDROmorphone  Injectable 1 milliGRAM(s) IV Push every 4 hours PRN Severe Pain (7 - 10)  temazepam 15 milliGRAM(s) Oral at bedtime PRN Insomnia      Physical exam:     General:               Awake, alert,                                                Neuro:                 focal,  weak   L>R  KAMLESH>LL                          Cardiovascular:    S1 & S2, regular                           Respiratory:         Bilateral rhonchi                           GI:                       Soft, nondistended and nontender, Bowel sounds active                            Ext:                      No cyanosis &  mild  bilateral  edema of upper ext  Labs:                                                                           11.3   21.93 )-----------( 56       ( 25 Oct 2017 16:03 )             36.3             10-25    139  |  99  |  29<H>  ----------------------------<  80  5.3   |  27  |  1.68<H>    Ca    8.5      25 Oct 2017 05:37                            CXR:    Plan:    Neuro:                                         Pain control with Tylenol IV / PO    CVA - Continue PT.     Physical medicine evaluation.                              Cardiovascular:                                             Continue hemodynamic monitoring.    PFO - On Eliquis 5mg/bid                            Respiratory:                                                                                                                                                                                                                                                                                                                                                                                                                                                                                                                                                                                                                                                                                                                                                                                                                                                                                                      Comfortable, not in any distress.                                         Monitor chest tube output.                                         Pneumothorax with L pigtail with Small air leak back on suction - 20 cmH2O                                                             Sarcoidosis: Steroids resumed as per pulmonary                             GI                                         Tolerating PO                                         Continue GI prophylaxis with Protonix                                         Continue Zofran / Reglan for nausea - PRN	                                                                 Renal:                                                                                  Monitor I/Os and electrolytes                                                 Hem/ Onc:                                                                                  Monitor chest tube output     Anemia: Continue Iron / FA                            Infectious disease:    Aspergilloma - On voriconazole 200 milliGRAM(s) Oral every 12 hours.     Monitor for fever / leukocytosis.                                         All surgical incision / chest tube  sites look clean                            Endocrine                                          Continue Accu-Checks with coverage    All available pertinent clinical, laboratory, radiographic, hemodynamic, echocardiographic, respiratory data, microbiologic data and chart were reviewed and analyzed   Patient seen, examined and plan discussed with CT Surgeon / CTICU team.    Pertinent clinical, laboratory, radiographic, hemodynamic, echocardiographic, respiratory data, microbiologic data and chart were reviewed and analyzed frequently throughout the course of the day and night  Patient seen, examined and plan discussed with CT Surgeon / CTICU team during rounds.    Pt's status discussed with family at bedside, updated status    I have spent     minutes of critical care time with this pt between   am/pm and   am/ pm        Len Kim MD OSCAR CHACKO            [id number]         No Known Allergies             47 M PMH of sarcoidosis, HTN, COPD, and asthma who was initially admitted on 09/10 w cc of severe leg spasms X two days  In E.D. pt found to be wheezing, so he was seen by Pulmonary and started on IV Steroids for sarcoidosis  On 9/12 at 4 am, a RRT and Stroke codes were called as the patient became unresponsive.  He was transferred to MICU and then intubated for airway protection.   The patient was found to have a new infarct on MRI and also found to have a patent PFO on this admission as well. He was weaned off vent on 09/13 and transferred to the floors   There was concern patient was having paroxysmal afib, so he was started on Eliquis yesterday evening. He has received 2 doses of Eliquis on 09/19.   However, on 09/20, the patient had a coughing episode, which was not unusual for him, but during this episode he began to cough up fresh blood, about 1/2 cup of blood. He underwent Bronchoscopy with bronchoalveolar lavage of both sides and epi injection on 09/21 and was to have received further IR embolization for hemoptysis control in the next few days.  However, he developed hemoptysis again and underwent urgent LULectomy, complicated with post op bleeding.    Left VATS and DAVID lobectomy 9/24/17 [POD # 18]  Re-op L thoracotomy, evacuation of hemothorax (600cc clot, 400cc blood) no obvious site of bleeding  9/25/17 [POD # 17]  Re-op L thoracotomy, evacuation of hemothorax; RTOR x2 100cc clot; 200cc blood: bleeding posterior intercostal arteries identified and cauterized 9/25/17 [POD # 17]  Pt was transferred to ICU because of increased SOB on the floor. Pt also had increased left apical pneumo for which I.R put a   Pigtail catheter. [POD # 6 (493900)] (Transferred back to ICU 2/2 increased SOB on telemetry and increased left apical ptx)    10/25/17  Pt was brought to ICU for acute SOB / Hypotension / Tachycardia / acute renal failure.     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  COPD (chronic obstructive pulmonary disease)  Asthma  Hypertension  Sarcoidosis  No significant past surgical history      ICU Vital Signs Last 24 Hrs  T(C): 37.4 (25 Oct 2017 16:45), Max: 37.4 (25 Oct 2017 16:45)  T(F): 99.3 (25 Oct 2017 16:45), Max: 99.3 (25 Oct 2017 16:45)  HR: 118 (25 Oct 2017 16:45) (92 - 139)  BP: 103/67 (25 Oct 2017 16:45) (85/82 - 147/60)  BP(mean): 75 (25 Oct 2017 16:45) (75 - 75)  ABP: --  ABP(mean): --  RR: 19 (25 Oct 2017 16:45) (18 - 32)  SpO2: 98% (25 Oct 2017 16:45) (95% - 99%)    I&O's Summary    24 Oct 2017 07:01  -  25 Oct 2017 07:00  --------------------------------------------------------  IN: 0 mL / OUT: 820 mL / NET: -820 mL    25 Oct 2017 07:01  -  25 Oct 2017 17:08  --------------------------------------------------------  IN: 0 mL / OUT: 75 mL / NET: -75 mL      CAPILLARY BLOOD GLUCOSE      MEDICATIONS  (STANDING):  ALPRAZolam 0.25 milliGRAM(s) Oral every 8 hours  apixaban 5 milliGRAM(s) Oral every 12 hours  atorvastatin 80 milliGRAM(s) Oral at bedtime  buDESOnide 160 MICROgram(s)/formoterol 4.5 MICROgram(s) Inhaler 2 Puff(s) Inhalation every 12 hours  diVALproex  milliGRAM(s) Oral two times a day  docusate sodium 100 milliGRAM(s) Oral two times a day  ferrous    sulfate 325 milliGRAM(s) Oral every 8 hours  folic acid 1 milliGRAM(s) Oral daily  levalbuterol Inhalation 0.63 milliGRAM(s) Inhalation every 6 hours  methylPREDNISolone sodium succinate Injectable 40 milliGRAM(s) IV Push every 8 hours  methylPREDNISolone sodium succinate Injectable 100 milliGRAM(s) IV Push once  metoprolol     tartrate 12.5 milliGRAM(s) Oral every 12 hours  midodrine 5 milliGRAM(s) Oral every 8 hours  pantoprazole    Tablet 40 milliGRAM(s) Oral two times a day before meals  polyethylene glycol 3350 17 Gram(s) Oral at bedtime  voriconazole 200 milliGRAM(s) Oral every 12 hours    MEDICATIONS  (PRN):  acetaminophen    Suspension 650 milliGRAM(s) Oral every 6 hours PRN For Temp greater than 38 C (100.4 F)  acetaminophen   Tablet. 650 milliGRAM(s) Oral every 6 hours PRN Mild Pain (1 - 3)  aluminum hydroxide/magnesium hydroxide/simethicone Suspension 30 milliLiter(s) Oral every 6 hours PRN Dyspepsia  HYDROmorphone  Injectable 1 milliGRAM(s) IV Push every 4 hours PRN Severe Pain (7 - 10)  temazepam 15 milliGRAM(s) Oral at bedtime PRN Insomnia      Physical exam:     General:               Awake, alert,                                                Neuro:                 focal,  weak   L>R  KAMLESH>LL                          Cardiovascular:    S1 & S2, regular                           Respiratory:         Bilateral rhonchi                           GI:                       Soft, nondistended and nontender, Bowel sounds active                            Ext:                      No cyanosis &  mild  bilateral  edema of upper ext  Labs:                                                                           11.3   21.93 )-----------( 56       ( 25 Oct 2017 16:03 )             36.3             10-25    139  |  99  |  29<H>  ----------------------------<  80  5.3   |  27  |  1.68<H>    Ca    8.5      25 Oct 2017 05:37                     CXR:  < from: Xray Chest 1 View AP -PORTABLE-Routine (10.25.17 @ 07:00) >  Status post left lung surgery with associated chain sutures and surgical   clip. The pigtail catheter overlying the left hemithorax is unchanged in   position.    Demonstration of the left apical pneumothorax which has decreased in   size. Redemonstration of bilateral reticular opacities. No pleural   effusions.    The cardiomediastinal silhouette cannot be accurately assessed on the   current projection.        Plan:  47 M PMH of sarcoidosis, HTN, COPD, and asthma s/p Left VATS and DAVID lobectomy, postop bleeding, re-op L thoracotomy, evacuation of hemothorax, talk pleurodesis for air leak / pneumo. Pt became hypotensive , tachycardia and progressively short of breath with diffuse wheezing. Transferred to ICU for hemodynamic monitoring, preventing shock and respiratory failure. O/E pt is in moderate respiratory distress.    Neuro:                                         Pain control with Tylenol                            Cardiovascular:                                             Continue hemodynamic monitoring.    Hypotension: On Midodrine. Maintain higher perfusion pressure for renal function    PFO - On Eliquis 5mg/bid                            Respiratory:                                                                                                                                                                                                                                                                                                                                                                                                                                                                                                                                                                                                                                                                                                                                                                                                                                                                                                      In moderate distress.     Restart Solumedrol 40mg Q8hrs                                         Monitor chest tube output.                                         Pneumothorax with L pigtail -  on suction - 20 cmH2O                                                             Sarcoidosis: On Steroids                             GI                                         NPO for now                                         Continue GI prophylaxis with Protonix                                         Continue Zofran / Reglan for nausea - PRN	                                                                 Renal:                                         JERRI: Maintain higher perfusion pressure.                                           Monitor I/Os and BMP                                                  Hem/ Onc:                                                                                  Thrombocytopenia - Monitor Platelets / Hb/Hct, increased risk of bleeding.      Anemia: Continue Iron / FA                            Infectious disease:    Aspergilloma - On voriconazole 200 milliGRAM(s)     Monitor for fever / leukocytosis.                                         All surgical incision / chest tube  sites look clean                            Endocrine                                          Continue Accu-Checks with coverage    All available pertinent clinical, laboratory, radiographic, hemodynamic, echocardiographic, respiratory data, microbiologic data and chart were reviewed and analyzed   Patient seen, examined and plan discussed with CT Surgeon / CTICU team.        I have spent  45   minutes of critical care time with this pt between  5pm and  8pm        Len Kim MD

## 2017-10-25 NOTE — PROGRESS NOTE ADULT - SUBJECTIVE AND OBJECTIVE BOX
Patient is a 47y old  Male who presents with a chief complaint of "My leg wouldn't stop shaking." (14 Sep 2017 09:59)  pt is doing ok  had fever last night  not SOB       Any change in ROS:     MEDICATIONS  (STANDING):  ALPRAZolam 0.25 milliGRAM(s) Oral every 8 hours  apixaban 5 milliGRAM(s) Oral every 12 hours  atorvastatin 80 milliGRAM(s) Oral at bedtime  buDESOnide 160 MICROgram(s)/formoterol 4.5 MICROgram(s) Inhaler 2 Puff(s) Inhalation every 12 hours  diVALproex  milliGRAM(s) Oral two times a day  docusate sodium 100 milliGRAM(s) Oral two times a day  ferrous    sulfate 325 milliGRAM(s) Oral every 8 hours  folic acid 1 milliGRAM(s) Oral daily  levalbuterol Inhalation 0.63 milliGRAM(s) Inhalation every 6 hours  methylPREDNISolone sodium succinate Injectable 40 milliGRAM(s) IV Push every 8 hours  methylPREDNISolone sodium succinate Injectable 100 milliGRAM(s) IV Push once  metoprolol     tartrate 12.5 milliGRAM(s) Oral every 12 hours  midodrine 5 milliGRAM(s) Oral every 8 hours  pantoprazole    Tablet 40 milliGRAM(s) Oral two times a day before meals  polyethylene glycol 3350 17 Gram(s) Oral at bedtime  voriconazole 200 milliGRAM(s) Oral every 12 hours    MEDICATIONS  (PRN):  acetaminophen    Suspension 650 milliGRAM(s) Oral every 6 hours PRN For Temp greater than 38 C (100.4 F)  acetaminophen   Tablet. 650 milliGRAM(s) Oral every 6 hours PRN Mild Pain (1 - 3)  aluminum hydroxide/magnesium hydroxide/simethicone Suspension 30 milliLiter(s) Oral every 6 hours PRN Dyspepsia  HYDROmorphone  Injectable 1 milliGRAM(s) IV Push every 4 hours PRN Severe Pain (7 - 10)  temazepam 15 milliGRAM(s) Oral at bedtime PRN Insomnia    Vital Signs Last 24 Hrs  T(C): 37.4 (25 Oct 2017 16:45), Max: 37.4 (25 Oct 2017 16:45)  T(F): 99.3 (25 Oct 2017 16:45), Max: 99.3 (25 Oct 2017 16:45)  HR: 109 (25 Oct 2017 17:30) (92 - 139)  BP: 100/64 (25 Oct 2017 17:30) (85/82 - 147/60)  BP(mean): 72 (25 Oct 2017 17:30) (67 - 75)  RR: 20 (25 Oct 2017 17:30) (18 - 32)  SpO2: 99% (25 Oct 2017 17:30) (95% - 99%)    I&O's Summary    24 Oct 2017 07:01  -  25 Oct 2017 07:00  --------------------------------------------------------  IN: 0 mL / OUT: 820 mL / NET: -820 mL    25 Oct 2017 07:01  -  25 Oct 2017 17:55  --------------------------------------------------------  IN: 240 mL / OUT: 75 mL / NET: 165 mL          Physical Exam:   GENERAL: NAD, well-groomed, well-developed  HEENT: GIOVANI/   Atraumatic, Normocephalic  ENMT: No tonsillar erythema, exudates, or enlargement; Moist mucous membranes, Good dentition, No lesions  NECK: Supple, No JVD, Normal thyroid  CHEST/LUNG: poor resp excursion  CVS: Regular rate and rhythm; No murmurs, rubs, or gallops  GI: : Soft, Nontender, Nondistended; Bowel sounds present  NERVOUS SYSTEM:  Alert & Oriented X3  EXTREMITIES:  2+ Peripheral Pulses, No clubbing, cyanosis, or edema  LYMPH: No lymphadenopathy noted  SKIN: No rashes or lesions  ENDOCRINOLOGY: No Thyromegaly  PSYCH: Appropriate    Labs:  ABG - ( 25 Oct 2017 16:00 )  pH: 7.30  /  pCO2: 61    /  pO2: 158   / HCO3: 26    / Base Excess: 3.3   /  SaO2:                           11.3   21.93 )-----------( 56       ( 25 Oct 2017 16:03 )             36.3                         11.0   21.40 )-----------( 57       ( 25 Oct 2017 05:37 )             35.9                         12.5   25.70 )-----------( 81       ( 24 Oct 2017 05:30 )             40.4                         12.2   16.97 )-----------( 79       ( 23 Oct 2017 22:15 )             39.0     10-25    139  |  99  |  29<H>  ----------------------------<  80  5.3   |  27  |  1.68<H>    Ca    8.5      25 Oct 2017 05:37      CAPILLARY BLOOD GLUCOSE                Cultures:               < from: Xray Chest 1 View AP -PORTABLE-Routine (10.25.17 @ 07:00) >    Status post left lung surgery with associated chain sutures and surgical   clip. The pigtail catheter overlying the left hemithorax is unchanged in   position.    Demonstration of the left apical pneumothorax which has decreased in   size. Redemonstration of bilateral reticular opacities. No pleural   effusions.    The cardiomediastinal silhouette cannot be accurately assessed on the   current projection.              THERESA LAWLER M.D., RADIOLOGY RESIDENT  Thisdocument has been electronically signed.  JULIETTE SMITH M.D., ATTENDING RADIOLOGIST  This document has been electronically signed. Oct 25 2017  1:32PM        < end of copied text >                Studies  Chest X-RAY  CT SCAN Chest   Venous Dopplers: LE:   CT Abdomen  Others

## 2017-10-25 NOTE — PROGRESS NOTE ADULT - PROBLEM SELECTOR PLAN 4
10/1: change to po prednisone 10 mg  10/02; cont po prednisone  10/3: stop prednisone after today,s dose?  10/6: OF PREDNISONE NOW  10/07: has sarcoidosis too: SOB with poor resp excursion: would start him on 20 mg of prednisone  10/8: cont prednisone with BD  10/09: his wheezing has increased today: Would change to IV METHYLPREDNISOLONE: 20 MG Q 8 HOURS  1/10was on 40 mg three times a day: dw icu attending , to decrease it to 20 q 8 hours  11/11: would change to po prednisone 40 mg daily  10/12: on steroids for now with slow taper  10/13: decrease prednisone to 30 mg tomorrow  10/14: prednisone 30 md every day : and then cut down to 20 mg  10/15: cont prednisone  10/16: on tapering steroids  10/17: no wheezing pt/ot  10/19: mild wheezing today : start duoneb ATC  10/20: give duoneb q 6 hours ATC  10/21: still with off and on wheezing: cont 20 mg of steroids  10/22: decrease steroids to 10 mg a day  10/25: started on high dose steroids by cticu

## 2017-10-25 NOTE — PROGRESS NOTE ADULT - SUBJECTIVE AND OBJECTIVE BOX
Subjective: pt admits he feels much better than he did earlier this AM    Vital Signs:  Vital Signs Last 24 Hrs  T(C): 36.8 (10-25-17 @ 08:39), Max: 37.2 (10-24-17 @ 16:21)  T(F): 98.2 (10-25-17 @ 08:39), Max: 98.9 (10-24-17 @ 16:21)  HR: 124 (10-25-17 @ 08:39) (96 - 139)  BP: 127/79 (10-25-17 @ 08:39) (85/82 - 147/60)  RR: 22 (10-25-17 @ 08:39) (18 - 23)  SpO2: 96% (10-25-17 @ 08:39) (95% - 100%) on (O2)    Telemetry/Alarms:  General: WN/WD NAD  Neurology: Awake, nonfocal, BETH x 4  Eyes: Scleras clear, PERRLA/ EOMI, Gross vision intact  ENT:Gross hearing intact, grossly patent pharynx, no stridor  Neck: Neck supple, trachea midline, No JVD,   Respiratory: CTA B/L, No wheezing, rales, rhonchi  CV: RRR, S1S2, no murmurs, rubs or gallops  Abdominal: Soft, NT, ND +BS,   Extremities: No edema, + peripheral pulses  Skin: No Rashes, Hematoma, Ecchymosis  Lymphatic: No Neck, axilla, groin LAD  Psych: Oriented x 3, normal affect  Incisions: c,d,i  Tubes: L PTC suction +expiratory air leak  Relevant labs, radiology and Medications reviewed                        11.0   21.40 )-----------( 57       ( 25 Oct 2017 05:37 )             35.9     10-25    139  |  99  |  29<H>  ----------------------------<  80  5.3   |  27  |  1.68<H>    Ca    8.5      25 Oct 2017 05:37        MEDICATIONS  (STANDING):  ALPRAZolam 0.25 milliGRAM(s) Oral every 8 hours  apixaban 5 milliGRAM(s) Oral every 12 hours  aspirin  chewable 81 milliGRAM(s) Oral daily  atorvastatin 80 milliGRAM(s) Oral at bedtime  buDESOnide 160 MICROgram(s)/formoterol 4.5 MICROgram(s) Inhaler 2 Puff(s) Inhalation every 12 hours  diVALproex  milliGRAM(s) Oral two times a day  docusate sodium 100 milliGRAM(s) Oral two times a day  ferrous    sulfate 325 milliGRAM(s) Oral every 8 hours  folic acid 1 milliGRAM(s) Oral daily  levalbuterol Inhalation 0.63 milliGRAM(s) Inhalation every 6 hours  metoprolol     tartrate 12.5 milliGRAM(s) Oral every 12 hours  midodrine 5 milliGRAM(s) Oral every 8 hours  pantoprazole    Tablet 40 milliGRAM(s) Oral two times a day before meals  polyethylene glycol 3350 17 Gram(s) Oral at bedtime  predniSONE   Tablet 10 milliGRAM(s) Oral daily  traZODone 50 milliGRAM(s) Oral at bedtime  voriconazole 200 milliGRAM(s) Oral every 12 hours    MEDICATIONS  (PRN):  acetaminophen    Suspension 650 milliGRAM(s) Oral every 6 hours PRN For Temp greater than 38 C (100.4 F)  acetaminophen   Tablet. 650 milliGRAM(s) Oral every 6 hours PRN Mild Pain (1 - 3)  aluminum hydroxide/magnesium hydroxide/simethicone Suspension 30 milliLiter(s) Oral every 6 hours PRN Dyspepsia  HYDROmorphone  Injectable 1 milliGRAM(s) IV Push every 4 hours PRN Severe Pain (7 - 10)  temazepam 15 milliGRAM(s) Oral at bedtime PRN Insomnia    Pertinent Physical Exam  I&O's Summary    24 Oct 2017 07:01  -  25 Oct 2017 07:00  --------------------------------------------------------  IN: 0 mL / OUT: 820 mL / NET: -820 mL        Assessment  47y Male  w/ PAST MEDICAL & SURGICAL HISTORY:  History of pneumothorax: History of 3 prior pneumonthoracies.  COPD (chronic obstructive pulmonary disease)  Asthma  Hypertension  Sarcoidosis    On (9/24/17), patient underwent Thoracotomy, DAVID lobectomy and RTOR x2 for hemostasis  Central venous catheter placement with ultrasound guidance  Thoracoscopic pneumolysis  Thoracotomy, with lung lobectomy  Bronchoscopy with bronchoalveolar lavage of both sides  . Postoperative course/issues: persistent air leak from L PTC; pt has not been able to tolerate waterseal trials   s/p bedside talc pleurodesis 10/23/17    PLAN  Neuro: Pain management  Pulm: Encourage coughing, deep breathing and use of incentive spirometry. Wean off supplemental oxygen as able. Daily CXR.   Cardio: Monitor telemetry/alarms  GI: Tolerating diet. Continue stool softeners.  Renal: monitor urine output, supplement electrolytes as needed  Vasc: Heparin SC/SCDs for DVT prophylaxis  Heme: Stable H/H. .   ID: Off antibiotics. Stable.  Therapy: OOB/ambulate  Tubes: Monitor Chest tube output, continue PTC to strict suction  Disposition: Aim to D/C to rehab once medically cleared  Discussed with Cardiothoracic Team at AM rounds.

## 2017-10-25 NOTE — PROGRESS NOTE ADULT - PROBLEM SELECTOR PLAN 2
pt has been having slowly decreasing platelets: he is on eliquis too: no bleeding. defer to primary team  10/22: please recheck his platelets  10/24: 81: stable: no bleeding  10/25: will be followed in [facility]

## 2017-10-25 NOTE — CHART NOTE - NSCHARTNOTEFT_GEN_A_CORE
Evaluated patient with severe pain and shortness of breath, respiratory rate 28, lung sounds decreased throughout.  Patient given IV acetaminophen and morphine with pain relief.  Stat chest x-ray done, IV lasix given.  Patient also seen by  [facility] attending.  Patient stating that respiratory status improved.  Patient with hypotension, albumin with good result.  Patient ordered IV dilaudid for pain management and restarted on xanax. Evaluated patient with severe pain and shortness of breath, respiratory rate 28, lung sounds decreased throughout.  Patient given IV acetaminophen and morphine with pain relief.  Stat chest x-ray done, IV lasix given.  Patient also seen by  Dr. Wen attending.  Patient stating that respiratory status improved.  Patient with hypotension, albumin with good result.  Patient ordered IV dilaudid for pain management and restarted on xanax. Evaluated patient with severe pain and shortness of breath, heart rate 160, bp 130/60, respiratory rate 28, lung sounds decreased throughout.  Patient given IV acetaminophen and morphine with pain relief.  Stat chest x-ray done, IV lasix given.  Patient also seen by  Dr. Wen attending.  Patient stating that respiratory status improved.  Patient then with hypotension, albumin given with good result.  Patient ordered IV dilaudid for pain management and restarted on xanax.

## 2017-10-25 NOTE — PROGRESS NOTE ADULT - PROBLEM SELECTOR PROBLEM 9
Rhinovirus infection

## 2017-10-26 LAB
BUN SERPL-MCNC: 22 MG/DL — SIGNIFICANT CHANGE UP (ref 7–23)
CALCIUM SERPL-MCNC: 9.2 MG/DL — SIGNIFICANT CHANGE UP (ref 8.4–10.5)
CHLORIDE SERPL-SCNC: 99 MMOL/L — SIGNIFICANT CHANGE UP (ref 98–107)
CO2 SERPL-SCNC: 29 MMOL/L — SIGNIFICANT CHANGE UP (ref 22–31)
CREAT SERPL-MCNC: 0.94 MG/DL — SIGNIFICANT CHANGE UP (ref 0.5–1.3)
GLUCOSE SERPL-MCNC: 142 MG/DL — HIGH (ref 70–99)
HCT VFR BLD CALC: 35.3 % — LOW (ref 39–50)
HCT VFR BLD CALC: 36.1 % — LOW (ref 39–50)
HGB BLD-MCNC: 11.1 G/DL — LOW (ref 13–17)
HGB BLD-MCNC: 11.3 G/DL — LOW (ref 13–17)
MAGNESIUM SERPL-MCNC: 2.1 MG/DL — SIGNIFICANT CHANGE UP (ref 1.6–2.6)
MCHC RBC-ENTMCNC: 31.1 PG — SIGNIFICANT CHANGE UP (ref 27–34)
MCHC RBC-ENTMCNC: 31.3 % — LOW (ref 32–36)
MCHC RBC-ENTMCNC: 31.4 % — LOW (ref 32–36)
MCHC RBC-ENTMCNC: 31.5 PG — SIGNIFICANT CHANGE UP (ref 27–34)
MCV RBC AUTO: 100.3 FL — HIGH (ref 80–100)
MCV RBC AUTO: 99.4 FL — SIGNIFICANT CHANGE UP (ref 80–100)
NRBC # FLD: 0 — SIGNIFICANT CHANGE UP
NRBC # FLD: 0 — SIGNIFICANT CHANGE UP
PHOSPHATE SERPL-MCNC: 3.3 MG/DL — SIGNIFICANT CHANGE UP (ref 2.5–4.5)
PLATELET # BLD AUTO: 53 K/UL — LOW (ref 150–400)
PLATELET # BLD AUTO: 58 K/UL — LOW (ref 150–400)
PMV BLD: 13.6 FL — HIGH (ref 7–13)
PMV BLD: 14.3 FL — HIGH (ref 7–13)
POTASSIUM SERPL-MCNC: 5.1 MMOL/L — SIGNIFICANT CHANGE UP (ref 3.5–5.3)
POTASSIUM SERPL-SCNC: 5.1 MMOL/L — SIGNIFICANT CHANGE UP (ref 3.5–5.3)
RBC # BLD: 3.52 M/UL — LOW (ref 4.2–5.8)
RBC # BLD: 3.63 M/UL — LOW (ref 4.2–5.8)
RBC # FLD: 15.6 % — HIGH (ref 10.3–14.5)
RBC # FLD: 15.6 % — HIGH (ref 10.3–14.5)
SODIUM SERPL-SCNC: 139 MMOL/L — SIGNIFICANT CHANGE UP (ref 135–145)
WBC # BLD: 14.67 K/UL — HIGH (ref 3.8–10.5)
WBC # BLD: 16.25 K/UL — HIGH (ref 3.8–10.5)
WBC # FLD AUTO: 14.67 K/UL — HIGH (ref 3.8–10.5)
WBC # FLD AUTO: 16.25 K/UL — HIGH (ref 3.8–10.5)

## 2017-10-26 PROCEDURE — 99291 CRITICAL CARE FIRST HOUR: CPT

## 2017-10-26 PROCEDURE — 71010: CPT | Mod: 26

## 2017-10-26 RX ADMIN — LEVALBUTEROL 0.63 MILLIGRAM(S): 1.25 SOLUTION, CONCENTRATE RESPIRATORY (INHALATION) at 11:47

## 2017-10-26 RX ADMIN — Medication 1 MILLIGRAM(S): at 12:08

## 2017-10-26 RX ADMIN — Medication 500 MICROGRAM(S): at 11:47

## 2017-10-26 RX ADMIN — ATORVASTATIN CALCIUM 80 MILLIGRAM(S): 80 TABLET, FILM COATED ORAL at 21:47

## 2017-10-26 RX ADMIN — Medication 0.25 MILLIGRAM(S): at 05:36

## 2017-10-26 RX ADMIN — DIVALPROEX SODIUM 500 MILLIGRAM(S): 500 TABLET, DELAYED RELEASE ORAL at 17:16

## 2017-10-26 RX ADMIN — LEVALBUTEROL 0.63 MILLIGRAM(S): 1.25 SOLUTION, CONCENTRATE RESPIRATORY (INHALATION) at 22:51

## 2017-10-26 RX ADMIN — PHENYLEPHRINE HYDROCHLORIDE 10.41 MICROGRAM(S)/KG/MIN: 10 INJECTION INTRAVENOUS at 08:00

## 2017-10-26 RX ADMIN — APIXABAN 5 MILLIGRAM(S): 2.5 TABLET, FILM COATED ORAL at 17:16

## 2017-10-26 RX ADMIN — APIXABAN 5 MILLIGRAM(S): 2.5 TABLET, FILM COATED ORAL at 05:36

## 2017-10-26 RX ADMIN — Medication 500 MICROGRAM(S): at 22:50

## 2017-10-26 RX ADMIN — LEVALBUTEROL 0.63 MILLIGRAM(S): 1.25 SOLUTION, CONCENTRATE RESPIRATORY (INHALATION) at 03:58

## 2017-10-26 RX ADMIN — BUDESONIDE AND FORMOTEROL FUMARATE DIHYDRATE 2 PUFF(S): 160; 4.5 AEROSOL RESPIRATORY (INHALATION) at 22:46

## 2017-10-26 RX ADMIN — Medication 650 MILLIGRAM(S): at 10:05

## 2017-10-26 RX ADMIN — Medication 325 MILLIGRAM(S): at 13:01

## 2017-10-26 RX ADMIN — Medication 20 MILLIGRAM(S): at 21:47

## 2017-10-26 RX ADMIN — MIDODRINE HYDROCHLORIDE 5 MILLIGRAM(S): 2.5 TABLET ORAL at 05:40

## 2017-10-26 RX ADMIN — VORICONAZOLE 200 MILLIGRAM(S): 10 INJECTION, POWDER, LYOPHILIZED, FOR SOLUTION INTRAVENOUS at 05:36

## 2017-10-26 RX ADMIN — Medication 325 MILLIGRAM(S): at 21:47

## 2017-10-26 RX ADMIN — PANTOPRAZOLE SODIUM 40 MILLIGRAM(S): 20 TABLET, DELAYED RELEASE ORAL at 17:15

## 2017-10-26 RX ADMIN — MIDODRINE HYDROCHLORIDE 5 MILLIGRAM(S): 2.5 TABLET ORAL at 21:47

## 2017-10-26 RX ADMIN — BUDESONIDE AND FORMOTEROL FUMARATE DIHYDRATE 2 PUFF(S): 160; 4.5 AEROSOL RESPIRATORY (INHALATION) at 11:46

## 2017-10-26 RX ADMIN — Medication 650 MILLIGRAM(S): at 10:35

## 2017-10-26 RX ADMIN — MIDODRINE HYDROCHLORIDE 5 MILLIGRAM(S): 2.5 TABLET ORAL at 13:01

## 2017-10-26 RX ADMIN — Medication 500 MICROGRAM(S): at 03:58

## 2017-10-26 RX ADMIN — LEVALBUTEROL 0.63 MILLIGRAM(S): 1.25 SOLUTION, CONCENTRATE RESPIRATORY (INHALATION) at 16:32

## 2017-10-26 RX ADMIN — VORICONAZOLE 200 MILLIGRAM(S): 10 INJECTION, POWDER, LYOPHILIZED, FOR SOLUTION INTRAVENOUS at 17:16

## 2017-10-26 RX ADMIN — Medication 325 MILLIGRAM(S): at 05:36

## 2017-10-26 RX ADMIN — PANTOPRAZOLE SODIUM 40 MILLIGRAM(S): 20 TABLET, DELAYED RELEASE ORAL at 05:36

## 2017-10-26 RX ADMIN — Medication 500 MICROGRAM(S): at 16:32

## 2017-10-26 RX ADMIN — Medication 40 MILLIGRAM(S): at 05:35

## 2017-10-26 RX ADMIN — DIVALPROEX SODIUM 500 MILLIGRAM(S): 500 TABLET, DELAYED RELEASE ORAL at 05:36

## 2017-10-26 RX ADMIN — Medication 20 MILLIGRAM(S): at 13:01

## 2017-10-26 RX ADMIN — Medication 12.5 MILLIGRAM(S): at 05:40

## 2017-10-26 NOTE — PROGRESS NOTE ADULT - PROBLEM SELECTOR PLAN 4
10/8: cont prednisone with BD  10/09: his wheezing has increased today: Would change to IV METHYLPREDNISOLONE: 20 MG Q 8 HOURS  1/10was on 40 mg three times a day: dw icu attending , to decrease it to 20 q 8 hours  11/11: would change to po prednisone 40 mg daily  10/12: on steroids for now with slow taper  10/13: decrease prednisone to 30 mg tomorrow  10/14: prednisone 30 md every day : and then cut down to 20 mg  10/15: cont prednisone  10/16: on tapering steroids  10/17: no wheezing pt/ot  10/19: mild wheezing today : start duoneb ATC  10/20: give duoneb q 6 hours ATC  10/21: still with off and on wheezing: cont 20 mg of steroids  10/22: decrease steroids to 10 mg a day  10/25: started on high dose steroids by cticu  10/26: pt was wheezing yesterday as well as was acutely hypercarbic: the steroids were increased and switched to IV: Decrease IV steroids to q 8 hours

## 2017-10-26 NOTE — PROGRESS NOTE ADULT - SUBJECTIVE AND OBJECTIVE BOX
OSCAR CHACKO          MRN-[id number]    HPI:  Patient is a 47 year old male with a PMHx of sarcoidosis, HTN, COPD, and asthma who presents to the ED with severe leg spasms. These intermittent left leg spasms began suddenly two days ago in the patient's home and have become more regular and worsening in contracture. The spasm began in the quad leading to severe erratic left leg movements that can last from seconds to minutes. Once the leg spasm and contracture stops, an "intense tingling and pain sensation" which starts in the foot and radiates up the left side of his body to his neck. Once the tingling sensation reached his neck, the patient became faint, but did not fully lose consciousness. The patient also had a HA at the time of the spasm and tingling which was diffuse throughout the head and rated a 10/10 on a pain scale. He denied ever having this severe of a HA or leg spasm prior this incident; the HA started yesterday and is still present today. Patient denies fever, chills, N/V/D, orthopnea, NPD, sputum, chest pain, leg swelling, and abdominal pain.     In E.D. pt found to have an abnormal EKG, where house cardiology for consulted.   On admission pt found to have moderate inspiratory and expiratory wheezes. (11 Sep 2017 07:22)      Procedure:  POD # :     Issues:        Interval/Overnight Events/ ROS  Pt remained hemodynamically stable overnight, not on any pressors or inotropes. OOB to chair, breathing comfortably with minimal pain. Ambulated several times . Denies pain, no SOB, no palpitations, no nausea/ no vomiting, no dizziness  A-line and mock d/carmen         PAST MEDICAL & SURGICAL HISTORY:  History of pneumothorax: History of 3 prior pneumonthoracies.  COPD (chronic obstructive pulmonary disease)  Asthma  Hypertension  Sarcoidosis  No significant past surgical history    Allergies    No Known Allergies    Intolerances            ***VITAL SIGNS:  Vital Signs Last 24 Hrs  T(C): 36.6 (26 Oct 2017 08:00), Max: 37.9 (25 Oct 2017 20:00)  T(F): 97.9 (26 Oct 2017 08:00), Max: 100.3 (25 Oct 2017 20:00)  HR: 83 (26 Oct 2017 11:00) (78 - 130)  BP: 90/60 (26 Oct 2017 11:00) (78/46 - 152/90)  BP(mean): 65 (26 Oct 2017 11:00) (54 - 105)  RR: 18 (26 Oct 2017 11:00) (16 - 32)  SpO2: 97% (26 Oct 2017 11:00) (92% - 99%)    I/Os:   I&O's Detail    25 Oct 2017 07:01  -  26 Oct 2017 07:00  --------------------------------------------------------  IN:    IV PiggyBack: 1000 mL    Oral Fluid: 240 mL    phenylephrine   Infusion: 95.5 mL  Total IN: 1335.5 mL    OUT:    Chest Tube: 85 mL    Voided: 1825 mL  Total OUT: 1910 mL    Total NET: -574.5 mL      26 Oct 2017 07:01  -  26 Oct 2017 11:31  --------------------------------------------------------  IN:    Oral Fluid: 240 mL    phenylephrine   Infusion: 20.8 mL  Total IN: 260.8 mL    OUT:    Chest Tube: 10 mL  Total OUT: 10 mL    Total NET: 250.8 mL          CAPILLARY BLOOD GLUCOSE          =======================  MEDICATIONS  ===================  MEDICATIONS  (STANDING):  ALPRAZolam 0.25 milliGRAM(s) Oral every 8 hours  apixaban 5 milliGRAM(s) Oral every 12 hours  atorvastatin 80 milliGRAM(s) Oral at bedtime  buDESOnide 160 MICROgram(s)/formoterol 4.5 MICROgram(s) Inhaler 2 Puff(s) Inhalation every 12 hours  diVALproex  milliGRAM(s) Oral two times a day  docusate sodium 100 milliGRAM(s) Oral two times a day  ferrous    sulfate 325 milliGRAM(s) Oral every 8 hours  folic acid 1 milliGRAM(s) Oral daily  ipratropium    for Nebulization 500 MICROGram(s) Nebulizer every 6 hours  levalbuterol Inhalation 0.63 milliGRAM(s) Inhalation every 6 hours  methylPREDNISolone sodium succinate Injectable 40 milliGRAM(s) IV Push every 8 hours  metoprolol     tartrate 12.5 milliGRAM(s) Oral every 12 hours  midodrine 5 milliGRAM(s) Oral every 8 hours  pantoprazole    Tablet 40 milliGRAM(s) Oral two times a day before meals  phenylephrine    Infusion 0.3 MICROgram(s)/kG/Min (10.406 mL/Hr) IV Continuous <Continuous>  polyethylene glycol 3350 17 Gram(s) Oral at bedtime  voriconazole 200 milliGRAM(s) Oral every 12 hours    MEDICATIONS  (PRN):  acetaminophen    Suspension 650 milliGRAM(s) Oral every 6 hours PRN For Temp greater than 38 C (100.4 F)  acetaminophen   Tablet. 650 milliGRAM(s) Oral every 6 hours PRN Mild Pain (1 - 3)  aluminum hydroxide/magnesium hydroxide/simethicone Suspension 30 milliLiter(s) Oral every 6 hours PRN Dyspepsia  HYDROmorphone  Injectable 1 milliGRAM(s) IV Push every 4 hours PRN Severe Pain (7 - 10)  temazepam 15 milliGRAM(s) Oral at bedtime PRN Insomnia      ======================VENTILATOR SETTINGS  ==============      =================== PATIENT CARE ACCESS DEVICES ==========  Peripheral IV  Central Venous Line	R	L	IJ	Fem	SC			Placed:   Arterial Line	R	L	PT	DP	Fem	Rad	Ax	Placed:   Midline:				  Urinary Catheter, Date Placed:   Necessity of urinary, arterial, and venous catheters discussed    ======================= PHYSICAL EXAM===================  General:                         Comfortable, Awake, alert, not in any distress  Neuro:                            Moving all extremities to commands. No focal deficits	  HEENT:                           GIOVANI/ ETT/ NGT/ trach  Respiratory:	Lungs clear on auscultation bilaterally with good aeration.                                           No rales, rhonchi, no wheezing. Effort even and unlabored.  CV:		Regular rate and rhythm. Normal S1/S2. No murmurs  Abdomen:	                     Soft,  nontender, not-distended. Bowel sounds present / absent.   Skin:		No rash.  Extremities:	Warm, no cyanosis or edema.  Palpable pulses    ============================ LABS =======================                        11.1   14.67 )-----------( 58       ( 26 Oct 2017 06:35 )             35.3     10-26    139  |  99  |  22  ----------------------------<  142<H>  5.1   |  29  |  0.94    Ca    9.2      26 Oct 2017 06:35  Phos  3.3     10-26  Mg     2.1     10-26          ABG - ( 25 Oct 2017 16:00 )  pH: 7.30  /  pCO2: 61    /  pO2: 158   / HCO3: 26    / Base Excess: 3.3   /  SaO2: 99.8                    ===================== IMAGING STUDIES ===================  Radiology personally reviewed.    ====================ASSESSMENT AND PLAN ================      ====================== NEUROLOGY=======================  Pain control with PCA / PCEA / Tylenol IV / Toradol / Percocet  Pt is on Precedex for agitation  Pt is sedated with Propofol / Fentanyl    ==================== RESPIRATORY========================  Pt is on            L nasal canula / Face tent____% FiO2  Comfortable, no evidence of distress.  Using incentive spirometry & doing                ml  Monitor chest tube output  Chest tube to suction / water seal	    Mechanical Ventilation:    Mechanical ventilator status assessed & settings reviewed  Continue bronchodilators, pulmonary toilet  Head of bed elevation to 30-40 degrees    ====================CARDIOVASCULAR=====================  Continue hemodynamic monitoring/ telemetry  Not on any pressors  Continue cardiovascular / antihypertensive medications    ===================== RENAL ============================  Continue LR 30CC/hr      D/C IVF  Monitor I/Os, BUN/ Cr  and electrolytes  D/C Mcok      Keep Mock  BPH: Continue Flomax/ Finasteride      ==================== GASTROINTESTINAL===================  On regular diet, tolerating well  Continue GI prophylaxis with Pepcid / Protonix  Continue Zofran / Reglan for nausea - PRN	  NPO    =======================    ENDOCRIN  =====================  Glycemic monitoring  F/S with coverage  ===================HEMATOLOGIC/ONCOLOGIC =============  Monitor chest tube output. No signs of active bleeding.   Follow CBC, coags  in AM  DVT prophylaxis with SCD, sc Heparin    ========================INFECTIOUS DISEASE===============  No signs of infection. Monitor for fever / leukocytosis.  All surgical incision / chest tube  sites look clean  D/C Mock      Pertinent clinical, laboratory, radiographic, hemodynamic, echocardiographic, respiratory data, microbiologic data and chart were reviewed and analyzed frequently throughout the course of the day and night. GI and DVT prophylaxis, glycemic control, head of bed elevation and skin care issues were addressed.  Patient seen, examined and plan discussed with CT Surgery / CTICU team during rounds.         I have spent               minutes of critical care time with this pt between            am/pm    and               am/ pm         minutes spent on total encounter; more than 50% of the visit was spent counseling and/or coordinating care by the attending physician.        KIM Robbins MD OSCAR CHACKO          MRN-[id number]      47 M PMH of sarcoidosis, HTN, COPD, and asthma who was initially admitted on 09/10 w cc of severe leg spasms X two days  In E.D. pt found to be wheezing, seen by Pulmonary and started on IV Steroids for sarcoidosis  On 9/12 at 4 am, a RRT and Stroke codes were called as the patient became unresponsive.  He was transferred to MICU and then intubated for airway protection.   The patient was found to have a new infarct on MRI and also found to have a patent PFO on this admission as well. He was weaned off vent on 09/13 and transferred to the floors   There was concern patient was having paroxysmal afib, so he was started on Eliquis. He has received 2 doses of Eliquis on 09/19.   However, on 09/20, the patient had a coughing episode, which was not unusual for him, but during this episode he began to cough up fresh blood, about 1/2 cup of blood. He underwent Bronchoscopy with bronchoalveolar lavage of both sides and epi injection on 09/21 and was to have received further IR embolization for hemoptysis control in the next few days.  However, he developed hemoptysis again and underwent urgent DAVID lobectomy, complicated with post op bleeding.    Left VATS and DAVID lobectomy 9/24/17 [POD # 32]  Re-op L thoracotomy, evacuation of hemothorax (600cc clot, 400cc blood) no obvious site of bleeding  9/25/17 [POD # 31]  Re-op L thoracotomy, evacuation of hemothorax; RTOR x2 100cc clot; 200cc blood: bleeding posterior intercostal arteries identified and cauterized 9/25/17 [POD # 31]  Pt was transferred to ICU because of increased SOB on the floor. Pt also had increased left apical pneumo for which I.R put a Pigtail catheter. [POD # 6 (10/06/17)]   (Transferred back to ICU 2/2 increased SOB on telemetry and increased left apical ptx)    10/25/17  Pt was brought to ICU for acute SOB / Hypotension / Tachycardia / acute renal failure.     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 These intermittent left leg spasms began suddenly two days ago in the patient's home and have become more regular and worsening in contracture. The spasm began in the quad leading to severe erratic left leg movements that can last from seconds to minutes. Once the leg spasm and contracture stops, an "intense tingling and pain sensation" which starts in the foot and radiates up the left side of his body to his neck. Once the tingling sensation reached his neck, the patient became faint, but did not fully lose consciousness. The patient also had a HA at the time of the spasm and tingling which was diffuse throughout the head and rated a 10/10 on a pain scale. He denied ever having this severe of a HA or leg spasm prior this incident; the HA started yesterday and is still present today. Patient denies fever, chills, N/V/D, orthopnea, NPD, sputum, chest pain, leg swelling, and abdominal pain.     In E.D. pt found to have an abnormal EKG, where house cardiology for consulted.   On admission pt found to have moderate inspiratory and expiratory wheezes. (11 Sep 2017 07:22)      Procedure:  POD # :     Issues:        Interval/Overnight Events/ ROS  Pt remained hemodynamically stable overnight, not on any pressors or inotropes. OOB to chair, breathing comfortably with minimal pain. Ambulated several times . Denies pain, no SOB, no palpitations, no nausea/ no vomiting, no dizziness  A-line and mock d/carmen         PAST MEDICAL & SURGICAL HISTORY:  History of pneumothorax: History of 3 prior pneumonthoracies.  COPD (chronic obstructive pulmonary disease)  Asthma  Hypertension  Sarcoidosis  No significant past surgical history    Allergies    No Known Allergies    Intolerances            ***VITAL SIGNS:  Vital Signs Last 24 Hrs  T(C): 36.6 (26 Oct 2017 08:00), Max: 37.9 (25 Oct 2017 20:00)  T(F): 97.9 (26 Oct 2017 08:00), Max: 100.3 (25 Oct 2017 20:00)  HR: 83 (26 Oct 2017 11:00) (78 - 130)  BP: 90/60 (26 Oct 2017 11:00) (78/46 - 152/90)  BP(mean): 65 (26 Oct 2017 11:00) (54 - 105)  RR: 18 (26 Oct 2017 11:00) (16 - 32)  SpO2: 97% (26 Oct 2017 11:00) (92% - 99%)    I/Os:   I&O's Detail    25 Oct 2017 07:01  -  26 Oct 2017 07:00  --------------------------------------------------------  IN:    IV PiggyBack: 1000 mL    Oral Fluid: 240 mL    phenylephrine   Infusion: 95.5 mL  Total IN: 1335.5 mL    OUT:    Chest Tube: 85 mL    Voided: 1825 mL  Total OUT: 1910 mL    Total NET: -574.5 mL      26 Oct 2017 07:01  -  26 Oct 2017 11:31  --------------------------------------------------------  IN:    Oral Fluid: 240 mL    phenylephrine   Infusion: 20.8 mL  Total IN: 260.8 mL    OUT:    Chest Tube: 10 mL  Total OUT: 10 mL    Total NET: 250.8 mL          CAPILLARY BLOOD GLUCOSE          =======================  MEDICATIONS  ===================  MEDICATIONS  (STANDING):  ALPRAZolam 0.25 milliGRAM(s) Oral every 8 hours  apixaban 5 milliGRAM(s) Oral every 12 hours  atorvastatin 80 milliGRAM(s) Oral at bedtime  buDESOnide 160 MICROgram(s)/formoterol 4.5 MICROgram(s) Inhaler 2 Puff(s) Inhalation every 12 hours  diVALproex  milliGRAM(s) Oral two times a day  docusate sodium 100 milliGRAM(s) Oral two times a day  ferrous    sulfate 325 milliGRAM(s) Oral every 8 hours  folic acid 1 milliGRAM(s) Oral daily  ipratropium    for Nebulization 500 MICROGram(s) Nebulizer every 6 hours  levalbuterol Inhalation 0.63 milliGRAM(s) Inhalation every 6 hours  methylPREDNISolone sodium succinate Injectable 40 milliGRAM(s) IV Push every 8 hours  metoprolol     tartrate 12.5 milliGRAM(s) Oral every 12 hours  midodrine 5 milliGRAM(s) Oral every 8 hours  pantoprazole    Tablet 40 milliGRAM(s) Oral two times a day before meals  phenylephrine    Infusion 0.3 MICROgram(s)/kG/Min (10.406 mL/Hr) IV Continuous <Continuous>  polyethylene glycol 3350 17 Gram(s) Oral at bedtime  voriconazole 200 milliGRAM(s) Oral every 12 hours    MEDICATIONS  (PRN):  acetaminophen    Suspension 650 milliGRAM(s) Oral every 6 hours PRN For Temp greater than 38 C (100.4 F)  acetaminophen   Tablet. 650 milliGRAM(s) Oral every 6 hours PRN Mild Pain (1 - 3)  aluminum hydroxide/magnesium hydroxide/simethicone Suspension 30 milliLiter(s) Oral every 6 hours PRN Dyspepsia  HYDROmorphone  Injectable 1 milliGRAM(s) IV Push every 4 hours PRN Severe Pain (7 - 10)  temazepam 15 milliGRAM(s) Oral at bedtime PRN Insomnia      ======================VENTILATOR SETTINGS  ==============      =================== PATIENT CARE ACCESS DEVICES ==========  Peripheral IV  Central Venous Line	R	L	IJ	Fem	SC			Placed:   Arterial Line	R	L	PT	DP	Fem	Rad	Ax	Placed:   Midline:				  Urinary Catheter, Date Placed:   Necessity of urinary, arterial, and venous catheters discussed    ======================= PHYSICAL EXAM===================  General:                         Comfortable, Awake, alert, not in any distress  Neuro:                            Moving all extremities to commands. No focal deficits	  HEENT:                           GIOVANI/ ETT/ NGT/ trach  Respiratory:	Lungs clear on auscultation bilaterally with good aeration.                                           No rales, rhonchi, no wheezing. Effort even and unlabored.  CV:		Regular rate and rhythm. Normal S1/S2. No murmurs  Abdomen:	                     Soft,  nontender, not-distended. Bowel sounds present / absent.   Skin:		No rash.  Extremities:	Warm, no cyanosis or edema.  Palpable pulses    ============================ LABS =======================                        11.1   14.67 )-----------( 58       ( 26 Oct 2017 06:35 )             35.3     10-26    139  |  99  |  22  ----------------------------<  142<H>  5.1   |  29  |  0.94    Ca    9.2      26 Oct 2017 06:35  Phos  3.3     10-26  Mg     2.1     10-26          ABG - ( 25 Oct 2017 16:00 )  pH: 7.30  /  pCO2: 61    /  pO2: 158   / HCO3: 26    / Base Excess: 3.3   /  SaO2: 99.8                    ===================== IMAGING STUDIES ===================  Radiology personally reviewed.    ====================ASSESSMENT AND PLAN ================      ====================== NEUROLOGY=======================  Pain control with PCA / PCEA / Tylenol IV / Toradol / Percocet  Pt is on Precedex for agitation  Pt is sedated with Propofol / Fentanyl    ==================== RESPIRATORY========================  Pt is on            L nasal canula / Face tent____% FiO2  Comfortable, no evidence of distress.  Using incentive spirometry & doing                ml  Monitor chest tube output  Chest tube to suction / water seal	    Mechanical Ventilation:    Mechanical ventilator status assessed & settings reviewed  Continue bronchodilators, pulmonary toilet  Head of bed elevation to 30-40 degrees    ====================CARDIOVASCULAR=====================  Continue hemodynamic monitoring/ telemetry  Not on any pressors  Continue cardiovascular / antihypertensive medications    ===================== RENAL ============================  Continue LR 30CC/hr      D/C IVF  Monitor I/Os, BUN/ Cr  and electrolytes  D/C Mock      Keep Mock  BPH: Continue Flomax/ Finasteride      ==================== GASTROINTESTINAL===================  On regular diet, tolerating well  Continue GI prophylaxis with Pepcid / Protonix  Continue Zofran / Reglan for nausea - PRN	  NPO    =======================    ENDOCRIN  =====================  Glycemic monitoring  F/S with coverage  ===================HEMATOLOGIC/ONCOLOGIC =============  Monitor chest tube output. No signs of active bleeding.   Follow CBC, coags  in AM  DVT prophylaxis with SCD, sc Heparin    ========================INFECTIOUS DISEASE===============  No signs of infection. Monitor for fever / leukocytosis.  All surgical incision / chest tube  sites look clean  D/C Mock      Pertinent clinical, laboratory, radiographic, hemodynamic, echocardiographic, respiratory data, microbiologic data and chart were reviewed and analyzed frequently throughout the course of the day and night. GI and DVT prophylaxis, glycemic control, head of bed elevation and skin care issues were addressed.  Patient seen, examined and plan discussed with CT Surgery / CTICU team during rounds.         I have spent               minutes of critical care time with this pt between            am/pm    and               am/ pm         minutes spent on total encounter; more than 50% of the visit was spent counseling and/or coordinating care by the attending physician.        KIM Robbins MD OSCAR CHACKO          MRN-[id number]      47 M PMH of sarcoidosis, HTN, COPD, and asthma who was initially admitted on 09/10 w cc of severe leg spasms X two days  In E.D. pt found to be wheezing, seen by Pulmonary and started on IV Steroids for sarcoidosis  On 9/12 at 4 am, a RRT and Stroke codes were called as the patient became unresponsive.  He was transferred to MICU and then intubated for airway protection.   The patient was found to have a new infarct on MRI and also found to have a patent PFO on this admission as well. He was weaned off vent on 09/13 and transferred to the floors   There was concern patient was having paroxysmal afib, so he was started on Eliquis. He has received 2 doses of Eliquis on 09/19.   However, on 09/20, the patient had a coughing episode, which was not unusual for him, but during this episode he began to cough up fresh blood, about 1/2 cup of blood. He underwent Bronchoscopy with bronchoalveolar lavage of both sides and epi injection on 09/21 and was to have received further IR embolization for hemoptysis control in the next few days.  However, he developed hemoptysis again and underwent urgent DAVID lobectomy, complicated with post op bleeding.    Left VATS and DAVID lobectomy 9/24/17 [POD # 32]  Re-op L thoracotomy, evacuation of hemothorax (600cc clot, 400cc blood) no obvious site of bleeding  9/25/17 [POD # 31]  Re-op L thoracotomy, evacuation of hemothorax; RTOR x2 100cc clot; 200cc blood: bleeding posterior intercostal arteries identified and cauterized 9/25/17 [POD # 31]  Pt was transferred to ICU because of increased SOB on the floor. Pt also had increased left apical pneumo for which I.R put a Pigtail catheter. [POD # 6 (10/06/17)]   (Transferred back to ICU 2/2 increased SOB on telemetry and increased left apical ptx)  10/23/17 bedside talc pleurodesis  10/25/17  Pt was brought to ICU for acute SOB / Hypotension / Tachycardia / acute renal failure.     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 OOB to chair, breathing comfortably with minimal pain. Ambulated several times in the room. Denies pain, minimal SOB, no palpitations, no nausea/ no vomiting, no dizziness       PAST MEDICAL & SURGICAL HISTORY:  History of pneumothorax: History of 3 prior pneumonthoracies.  COPD (chronic obstructive pulmonary disease)  Asthma  Hypertension  Sarcoidosis  No significant past surgical history    Allergies   No Known Allergies          ***VITAL SIGNS:  Vital Signs Last 24 Hrs  T(C): 36.6 (26 Oct 2017 08:00), Max: 37.9 (25 Oct 2017 20:00)  T(F): 97.9 (26 Oct 2017 08:00), Max: 100.3 (25 Oct 2017 20:00)  HR: 83 (26 Oct 2017 11:00) (78 - 130)  BP: 90/60 (26 Oct 2017 11:00) (78/46 - 152/90)  BP(mean): 65 (26 Oct 2017 11:00) (54 - 105)  RR: 18 (26 Oct 2017 11:00) (16 - 32)  SpO2: 97% (26 Oct 2017 11:00) (92% - 99%)    I/Os:   I&O's Detail    25 Oct 2017 07:01  -  26 Oct 2017 07:00  --------------------------------------------------------  IN:    IV PiggyBack: 1000 mL    Oral Fluid: 240 mL    phenylephrine   Infusion: 95.5 mL  Total IN: 1335.5 mL    OUT:    Chest Tube: 85 mL    Voided: 1825 mL  Total OUT: 1910 mL    Total NET: -574.5 mL      26 Oct 2017 07:01  -  26 Oct 2017 11:31  --------------------------------------------------------  IN:    Oral Fluid: 240 mL    phenylephrine   Infusion: 20.8 mL  Total IN: 260.8 mL    OUT:    Chest Tube: 10 mL  Total OUT: 10 mL    Total NET: 250.8 mL        CAPILLARY BLOOD GLUCOSE          =======================  MEDICATIONS  ===================  MEDICATIONS  (STANDING):  ALPRAZolam 0.25 milliGRAM(s) Oral every 8 hours  apixaban 5 milliGRAM(s) Oral every 12 hours  atorvastatin 80 milliGRAM(s) Oral at bedtime  buDESOnide 160 MICROgram(s)/formoterol 4.5 MICROgram(s) Inhaler 2 Puff(s) Inhalation every 12 hours  diVALproex  milliGRAM(s) Oral two times a day  docusate sodium 100 milliGRAM(s) Oral two times a day  ferrous    sulfate 325 milliGRAM(s) Oral every 8 hours  folic acid 1 milliGRAM(s) Oral daily  ipratropium    for Nebulization 500 MICROGram(s) Nebulizer every 6 hours  levalbuterol Inhalation 0.63 milliGRAM(s) Inhalation every 6 hours  methylPREDNISolone sodium succinate Injectable 40 milliGRAM(s) IV Push every 8 hours  metoprolol     tartrate 12.5 milliGRAM(s) Oral every 12 hours  midodrine 5 milliGRAM(s) Oral every 8 hours  pantoprazole    Tablet 40 milliGRAM(s) Oral two times a day before meals  phenylephrine    Infusion 0.3 MICROgram(s)/kG/Min (10.406 mL/Hr) IV Continuous <Continuous>  polyethylene glycol 3350 17 Gram(s) Oral at bedtime  voriconazole 200 milliGRAM(s) Oral every 12 hours    MEDICATIONS  (PRN):  acetaminophen    Suspension 650 milliGRAM(s) Oral every 6 hours PRN For Temp greater than 38 C (100.4 F)  acetaminophen   Tablet. 650 milliGRAM(s) Oral every 6 hours PRN Mild Pain (1 - 3)  aluminum hydroxide/magnesium hydroxide/simethicone Suspension 30 milliLiter(s) Oral every 6 hours PRN Dyspepsia  HYDROmorphone  Injectable 1 milliGRAM(s) IV Push every 4 hours PRN Severe Pain (7 - 10)  temazepam 15 milliGRAM(s) Oral at bedtime PRN Insomnia      ======================VENTILATOR SETTINGS  ==============      =================== PATIENT CARE ACCESS DEVICES ==========  Peripheral IV  Central Venous Line	R	L	IJ	Fem	SC			Placed:   Arterial Line	R	L	PT	DP	Fem	Rad	Ax	Placed:   Midline:				  Urinary Catheter, Date Placed:   Necessity of urinary, arterial, and venous catheters discussed    ======================= PHYSICAL EXAM===================  General:                         Comfortable, Awake, alert, not in any distress  Neuro:                            Moving all extremities to commands. No focal deficits	  HEENT:                           GIOVANI/ ETT/ NGT/ trach  Respiratory:	Lungs clear on auscultation bilaterally with good aeration.                                           No rales, rhonchi, no wheezing. Effort even and unlabored.  CV:		Regular rate and rhythm. Normal S1/S2. No murmurs  Abdomen:	                     Soft,  nontender, not-distended. Bowel sounds present / absent.   Skin:		No rash.  Extremities:	Warm, no cyanosis or edema.  Palpable pulses    ============================ LABS =======================                        11.1   14.67 )-----------( 58       ( 26 Oct 2017 06:35 )             35.3     10-26    139  |  99  |  22  ----------------------------<  142<H>  5.1   |  29  |  0.94    Ca    9.2      26 Oct 2017 06:35  Phos  3.3     10-26  Mg     2.1     10-26          ABG - ( 25 Oct 2017 16:00 )  pH: 7.30  /  pCO2: 61    /  pO2: 158   / HCO3: 26    / Base Excess: 3.3   /  SaO2: 99.8                    ===================== IMAGING STUDIES ===================  Radiology personally reviewed.    ====================ASSESSMENT AND PLAN ================      ====================== NEUROLOGY=======================  Pain control with PCA / PCEA / Tylenol IV / Toradol / Percocet  Pt is on Precedex for agitation  Pt is sedated with Propofol / Fentanyl    ==================== RESPIRATORY========================  Pt is on            L nasal canula / Face tent____% FiO2  Comfortable, no evidence of distress.  Using incentive spirometry & doing                ml  Monitor chest tube output  Chest tube to suction / water seal	    Mechanical Ventilation:    Mechanical ventilator status assessed & settings reviewed  Continue bronchodilators, pulmonary toilet  Head of bed elevation to 30-40 degrees    ====================CARDIOVASCULAR=====================  Continue hemodynamic monitoring/ telemetry  Not on any pressors  Continue cardiovascular / antihypertensive medications    ===================== RENAL ============================  Continue LR 30CC/hr      D/C IVF  Monitor I/Os, BUN/ Cr  and electrolytes  D/C Pickens      Keep Pickens  BPH: Continue Flomax/ Finasteride      ==================== GASTROINTESTINAL===================  On regular diet, tolerating well  Continue GI prophylaxis with Pepcid / Protonix  Continue Zofran / Reglan for nausea - PRN	  NPO    =======================    ENDOCRIN  =====================  Glycemic monitoring  F/S with coverage  ===================HEMATOLOGIC/ONCOLOGIC =============  Monitor chest tube output. No signs of active bleeding.   Follow CBC, coags  in AM  DVT prophylaxis with SCD, sc Heparin    ========================INFECTIOUS DISEASE===============  No signs of infection. Monitor for fever / leukocytosis.  All surgical incision / chest tube  sites look clean  D/C [place]      Pertinent clinical, laboratory, radiographic, hemodynamic, echocardiographic, respiratory data, microbiologic data and chart were reviewed and analyzed frequently throughout the course of the day and night. GI and DVT prophylaxis, glycemic control, head of bed elevation and skin care issues were addressed.  Patient seen, examined and plan discussed with CT Surgery / CTICU team during rounds.         I have spent               minutes of critical care time with this pt between            am/pm    and               am/ pm         minutes spent on total encounter; more than 50% of the visit was spent counseling and/or coordinating care by the attending physician.        KIM Robbins MD [facility]          MRN-[id number]      47 M PMH of sarcoidosis, HTN, COPD, and asthma who was initially admitted on 09/10 w cc of severe leg spasms X two days  In E.D. pt found to be wheezing, seen by Pulmonary and started on IV Steroids for sarcoidosis  On 9/12 at 4 am, a RRT and Stroke codes were called as the patient became unresponsive.  He was transferred to MICU and then intubated for airway protection.   The patient was found to have a new infarct on MRI and also found to have a patent PFO on this admission as well. He was weaned off vent on 09/13 and transferred to the floors   There was concern patient was having paroxysmal afib, so he was started on Eliquis. He has received 2 doses of Eliquis on 09/19.   However, on 09/20, the patient had a coughing episode, which was not unusual for him, but during this episode he began to cough up fresh blood, about 1/2 cup of blood. He underwent Bronchoscopy with bronchoalveolar lavage of both sides and epi injection on 09/21 and was to have received further IR embolization for hemoptysis control in the next few days.  However, he developed hemoptysis again and underwent urgent DAVID lobectomy, complicated with post op bleeding.    Left VATS and DAVID lobectomy 9/24/17 [POD # 32]  Re-op L thoracotomy, evacuation of hemothorax (600cc clot, 400cc blood) no obvious site of bleeding  9/25/17 [POD # 31]  Re-op L thoracotomy, evacuation of hemothorax; RTOR x2 100cc clot; 200cc blood: bleeding posterior intercostal arteries identified and cauterized 9/25/17 [POD # 31]  Pt was transferred to ICU because of increased SOB on the floor. Pt also had increased left apical pneumo for which I.R put a Pigtail catheter. [POD # 6 (10/06/17)]   (Transferred back to ICU 2/2 increased SOB on telemetry and increased left apical ptx)  10/23/17 bedside talc pleurodesis  10/25/17  Pt was brought to ICU for acute SOB / Hypotension / Tachycardia / acute renal failure.     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 OOB to chair, breathing comfortably with minimal pain. Ambulated several times in the room. Denies pain, minimal SOB, no palpitations, no nausea/ no vomiting, no dizziness       PAST MEDICAL & SURGICAL HISTORY:  History of pneumothorax: History of 3 prior pneumonthoracies.  COPD (chronic obstructive pulmonary disease)  Asthma  Hypertension  Sarcoidosis  No significant past surgical history    Allergies   No Known Allergies          ***VITAL SIGNS:  Vital Signs Last 24 Hrs  T(C): 36.6 (26 Oct 2017 08:00), Max: 37.9 (25 Oct 2017 20:00)  T(F): 97.9 (26 Oct 2017 08:00), Max: 100.3 (25 Oct 2017 20:00)  HR: 83 (26 Oct 2017 11:00) (78 - 130)  BP: 90/60 (26 Oct 2017 11:00) (78/46 - 152/90)  BP(mean): 65 (26 Oct 2017 11:00) (54 - 105)  RR: 18 (26 Oct 2017 11:00) (16 - 32)  SpO2: 97% (26 Oct 2017 11:00) (92% - 99%)    I/Os:   I&O's Detail    25 Oct 2017 07:01  -  26 Oct 2017 07:00  --------------------------------------------------------  IN:    IV PiggyBack: 1000 mL    Oral Fluid: 240 mL    phenylephrine   Infusion: 95.5 mL  Total IN: 1335.5 mL    OUT:    Chest Tube: 85 mL    Voided: 1825 mL  Total OUT: 1910 mL    Total NET: -574.5 mL      26 Oct 2017 07:01  -  26 Oct 2017 11:31  --------------------------------------------------------  IN:    Oral Fluid: 240 mL    phenylephrine   Infusion: 20.8 mL  Total IN: 260.8 mL    OUT:    Chest Tube: 10 mL  Total OUT: 10 mL    Total NET: 250.8 mL        CAPILLARY BLOOD GLUCOSE          =======================  MEDICATIONS  ===================  MEDICATIONS  (STANDING):  ALPRAZolam 0.25 milliGRAM(s) Oral every 8 hours  apixaban 5 milliGRAM(s) Oral every 12 hours  atorvastatin 80 milliGRAM(s) Oral at bedtime  buDESOnide 160 MICROgram(s)/formoterol 4.5 MICROgram(s) Inhaler 2 Puff(s) Inhalation every 12 hours  diVALproex  milliGRAM(s) Oral two times a day  docusate sodium 100 milliGRAM(s) Oral two times a day  ferrous    sulfate 325 milliGRAM(s) Oral every 8 hours  folic acid 1 milliGRAM(s) Oral daily  ipratropium    for Nebulization 500 MICROGram(s) Nebulizer every 6 hours  levalbuterol Inhalation 0.63 milliGRAM(s) Inhalation every 6 hours  methylPREDNISolone sodium succinate Injectable 40 milliGRAM(s) IV Push every 8 hours  metoprolol     tartrate 12.5 milliGRAM(s) Oral every 12 hours  midodrine 5 milliGRAM(s) Oral every 8 hours  pantoprazole    Tablet 40 milliGRAM(s) Oral two times a day before meals  phenylephrine    Infusion 0.3 MICROgram(s)/kG/Min (10.406 mL/Hr) IV Continuous <Continuous>  polyethylene glycol 3350 17 Gram(s) Oral at bedtime  voriconazole 200 milliGRAM(s) Oral every 12 hours    MEDICATIONS  (PRN):  acetaminophen    Suspension 650 milliGRAM(s) Oral every 6 hours PRN For Temp greater than 38 C (100.4 F)  acetaminophen   Tablet. 650 milliGRAM(s) Oral every 6 hours PRN Mild Pain (1 - 3)  aluminum hydroxide/magnesium hydroxide/simethicone Suspension 30 milliLiter(s) Oral every 6 hours PRN Dyspepsia  HYDROmorphone  Injectable 1 milliGRAM(s) IV Push every 4 hours PRN Severe Pain (7 - 10)  temazepam 15 milliGRAM(s) Oral at bedtime PRN Insomnia      ======================VENTILATOR SETTINGS  ==============      =================== PATIENT CARE ACCESS DEVICES ==========  Peripheral IV  Central Venous Line	R	L	IJ	Fem	SC			Placed:   Arterial Line	R	L	PT	DP	Fem	Rad	Ax	Placed:   Midline:				  Urinary Catheter, Date Placed:   Necessity of urinary, arterial, and venous catheters discussed    ======================= PHYSICAL EXAM===================  General:                         Comfortable, Awake, alert, not in any distress  Neuro:                            Moving all extremities to commands. No focal deficits	  HEENT:                           GIOVANI/ ETT/ NGT/ trach  Respiratory:	Lungs clear on auscultation bilaterally with good aeration.                                           No rales, rhonchi, no wheezing. Effort even and unlabored.  CV:		Regular rate and rhythm. Normal S1/S2. No murmurs  Abdomen:	                     Soft,  nontender, not-distended. Bowel sounds present / absent.   Skin:		No rash.  Extremities:	Warm, no cyanosis or edema.  Palpable pulses    ============================ LABS =======================                        11.1   14.67 )-----------( 58       ( 26 Oct 2017 06:35 )             35.3     10-26    139  |  99  |  22  ----------------------------<  142<H>  5.1   |  29  |  0.94    Ca    9.2      26 Oct 2017 06:35  Phos  3.3     10-26  Mg     2.1     10-26          ABG - ( 25 Oct 2017 16:00 )  pH: 7.30  /  pCO2: 61    /  pO2: 158   / HCO3: 26    / Base Excess: 3.3   /  SaO2: 99.8                    ===================== IMAGING STUDIES ===================  Radiology personally reviewed.    ====================ASSESSMENT AND PLAN ================      ====================== NEUROLOGY=======================  Pain control with PCA / PCEA / Tylenol IV / Toradol / Percocet  Pt is on Precedex for agitation  Pt is sedated with Propofol / Fentanyl    ==================== RESPIRATORY========================  Pt is on            L nasal canula / Face tent____% FiO2  Comfortable, no evidence of distress.  Using incentive spirometry & doing                ml  Monitor chest tube output  Chest tube to suction / water seal	    Mechanical Ventilation:    Mechanical ventilator status assessed & settings reviewed  Continue bronchodilators, pulmonary toilet  Head of bed elevation to 30-40 degrees    ====================CARDIOVASCULAR=====================  Continue hemodynamic monitoring/ telemetry  Not on any pressors  Continue cardiovascular / antihypertensive medications    ===================== RENAL ============================  Continue LR 30CC/hr      D/C IVF  Monitor I/Os, BUN/ Cr  and electrolytes  D/C [place]      Keep Pickens  BPH: Continue Flomax/ Finasteride      ==================== GASTROINTESTINAL===================  On regular diet, tolerating well  Continue GI prophylaxis with Pepcid / Protonix  Continue Zofran / Reglan for nausea - PRN	  NPO    =======================    ENDOCRIN  =====================  Glycemic monitoring  F/S with coverage  ===================HEMATOLOGIC/ONCOLOGIC =============  Monitor chest tube output. No signs of active bleeding.   Follow CBC, coags  in AM  DVT prophylaxis with SCD, sc Heparin    ========================INFECTIOUS DISEASE===============  No signs of infection. Monitor for fever / leukocytosis.  All surgical incision / chest tube  sites look clean  D/C [place]      Pertinent clinical, laboratory, radiographic, hemodynamic, echocardiographic, respiratory data, microbiologic data and chart were reviewed and analyzed frequently throughout the course of the day and night. GI and DVT prophylaxis, glycemic control, head of bed elevation and skin care issues were addressed.  Patient seen, examined and plan discussed with CT Surgery / CTICU team during rounds.         I have spent               minutes of critical care time with this pt between            am/pm    and               am/ pm         minutes spent on total encounter; more than 50% of the visit was spent counseling and/or coordinating care by the attending physician.        KIM Robbins MD [facility]          MRN-[id number]      47 M PMH of sarcoidosis, HTN, COPD, and asthma who was initially admitted on 09/10 w cc of severe leg spasms X two days  In E.D. pt found to be wheezing, seen by Pulmonary and started on IV Steroids for sarcoidosis  On 9/12 at 4 am, a RRT and Stroke codes were called as the patient became unresponsive.  He was transferred to MICU and then intubated for airway protection.   The patient was found to have a new infarct on MRI and also found to have a patent PFO on this admission as well. He was weaned off vent on 09/13 and transferred to the floors   There was concern patient was having paroxysmal afib, so he was started on Eliquis. He has received 2 doses of Eliquis on 09/19.   However, on 09/20, the patient had a coughing episode, which was not unusual for him, but during this episode he began to cough up fresh blood, about 1/2 cup of blood. He underwent Bronchoscopy with bronchoalveolar lavage of both sides and epi injection on 09/21 and was to have received further IR embolization for hemoptysis control in the next few days.  However, he developed hemoptysis again and underwent urgent DAVID lobectomy, complicated with post op bleeding.    Left VATS and DAVID lobectomy 9/24/17 [POD # 32]  Re-op L thoracotomy, evacuation of hemothorax (600cc clot, 400cc blood) no obvious site of bleeding  9/25/17 [POD # 31]  Re-op L thoracotomy, evacuation of hemothorax; RTOR x2 100cc clot; 200cc blood: bleeding posterior intercostal arteries identified and cauterized 9/25/17 [POD # 31]  Pt was transferred to ICU because of increased SOB on the floor. Pt also had increased left apical pneumo for which I.R put a Pigtail catheter. [POD # 6 (10/06/17)]   (Transferred back to ICU 2/2 increased SOB on telemetry and increased left apical ptx)  10/23/17 bedside talc pleurodesis  10/25/17  Pt was brought to ICU for acute SOB / Hypotension / Tachycardia / acute renal failure.     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 OOB to chair, breathing comfortably with minimal pain. Ambulated several times in the room. Denies pain, minimal SOB, no palpitations, no nausea/ no vomiting, no dizziness       PAST MEDICAL & SURGICAL HISTORY:  History of pneumothorax: History of 3 prior pneumonthoracies.  COPD (chronic obstructive pulmonary disease)  Asthma  Hypertension  Sarcoidosis  No significant past surgical history    Allergies   No Known Allergies          ***VITAL SIGNS:  Vital Signs Last 24 Hrs  T(C): 36.6 (26 Oct 2017 08:00), Max: 37.9 (25 Oct 2017 20:00)  T(F): 97.9 (26 Oct 2017 08:00), Max: 100.3 (25 Oct 2017 20:00)  HR: 83 (26 Oct 2017 11:00) (78 - 130)  BP: 90/60 (26 Oct 2017 11:00) (78/46 - 152/90)  BP(mean): 65 (26 Oct 2017 11:00) (54 - 105)  RR: 18 (26 Oct 2017 11:00) (16 - 32)  SpO2: 97% (26 Oct 2017 11:00) (92% - 99%)    I/Os:   I&O's Detail    25 Oct 2017 07:01  -  26 Oct 2017 07:00  --------------------------------------------------------  IN:    IV PiggyBack: 1000 mL    Oral Fluid: 240 mL    phenylephrine   Infusion: 95.5 mL  Total IN: 1335.5 mL    OUT:    Chest Tube: 85 mL    Voided: 1825 mL  Total OUT: 1910 mL    Total NET: -574.5 mL      26 Oct 2017 07:01  -  26 Oct 2017 11:31  --------------------------------------------------------  IN:    Oral Fluid: 240 mL    phenylephrine   Infusion: 20.8 mL  Total IN: 260.8 mL    OUT:    Chest Tube: 10 mL  Total OUT: 10 mL    Total NET: 250.8 mL        CAPILLARY BLOOD GLUCOSE          =======================  MEDICATIONS  ===================  MEDICATIONS  (STANDING):  ALPRAZolam 0.25 milliGRAM(s) Oral every 8 hours  apixaban 5 milliGRAM(s) Oral every 12 hours  atorvastatin 80 milliGRAM(s) Oral at bedtime  buDESOnide 160 MICROgram(s)/formoterol 4.5 MICROgram(s) Inhaler 2 Puff(s) Inhalation every 12 hours  diVALproex  milliGRAM(s) Oral two times a day  docusate sodium 100 milliGRAM(s) Oral two times a day  ferrous    sulfate 325 milliGRAM(s) Oral every 8 hours  folic acid 1 milliGRAM(s) Oral daily  ipratropium    for Nebulization 500 MICROGram(s) Nebulizer every 6 hours  levalbuterol Inhalation 0.63 milliGRAM(s) Inhalation every 6 hours  methylPREDNISolone sodium succinate Injectable 40 milliGRAM(s) IV Push every 8 hours  metoprolol     tartrate 12.5 milliGRAM(s) Oral every 12 hours  midodrine 5 milliGRAM(s) Oral every 8 hours  pantoprazole    Tablet 40 milliGRAM(s) Oral two times a day before meals  phenylephrine    Infusion 0.3 MICROgram(s)/kG/Min (10.406 mL/Hr) IV Continuous  polyethylene glycol 3350 17 Gram(s) Oral at bedtime  voriconazole 200 milliGRAM(s) Oral every 12 hours    MEDICATIONS  (PRN):  acetaminophen    Suspension 650 milliGRAM(s) Oral every 6 hours PRN For Temp greater than 38 C (100.4 F)  acetaminophen   Tablet. 650 milliGRAM(s) Oral every 6 hours PRN Mild Pain (1 - 3)  aluminum hydroxide/magnesium hydroxide/simethicone Suspension 30 milliLiter(s) Oral every 6 hours PRN Dyspepsia  HYDROmorphone  Injectable 1 milliGRAM(s) IV Push every 4 hours PRN Severe Pain (7 - 10)  temazepam 15 milliGRAM(s) Oral at bedtime PRN Insomnia        =================== PATIENT CARE ACCESS DEVICES ==========  Peripheral IV (+)     ======================= PHYSICAL EXAM===================  General:            Comfortable, Awake, alert, not in any distress  Neuro:              Moving all extremities to commands. Minimal   residual LUE and LLE weakness	  HEENT:              GIOVANI  Respiratory:	Lungs  on auscultation bilaterally with good aeration.                            (+) fine rhonchi, no wheezing. Effort even and unlabored.                            LEft anterior pigtail in place - no air leak  CV:		Regular rate and rhythm. Normal S1/S2. No murmurs  Abdomen:	Soft,  nontender, not-distended. Bowel sounds present   Skin:		No rash.  Extremities:	Warm, no cyanosis or edema.  Palpable pulses    ============================ LABS =======================                        11.1   14.67 )-----------( 58       ( 26 Oct 2017 06:35 )             35.3       139  |  99  |  22  ----------------------<  142<H>  5.1   |  29  |  0.94    Ca    9.2      26 Oct 2017 06:35  Phos  3.3     10-26  Mg     2.1     10-26    ABG - ( 25 Oct 2017 16:00 )  pH: 7.30  /  pCO2: 61    /  pO2: 158   / HCO3: 26    / Base Excess: 3.3   /  SaO2: 99.8        ===================== IMAGING STUDIES ===================  Radiology personally reviewed.  < from: Xray Chest 1 View AP- PORTABLE-Urgent (10.25.17 @ 17:44) >  PROCEDURE DATE:  Oct 25 2017         INTERPRETATION:  TIME OF EXAM: October 25, 2017 at 5:20 PM.    CLINICAL INFORMATION: Status post VATS.    COMPARISON:  October 25, 2017 at 6:38 AM.    TECHNIQUE:   AP Portable chest x-ray.    INTERPRETATION:     The heart is not enlarged.  Bilateral lung opacities are not significantly changed.  A trace right pleural effusion is unchanged.  Left lung postsurgical change again noted.  Left pleural pigtail catheter is unchanged in position.  No significant change in left hydropneumothorax.            IMPRESSION:  No significant change.    Left pleural pigtail catheter unchanged.    Unchanged left hydropneumothorax and trace right pleural effusion.    Unchanged bilateral lung opacities.    < end of copied text >    ====================ASSESSMENT AND PLAN ================      ====================== NEUROLOGY=======================  Pain control with PCA / PCEA / Tylenol IV / Toradol / Percocet  Pt is on Precedex for agitation  Pt is sedated with Propofol / Fentanyl    ==================== RESPIRATORY========================  Pt is on            L nasal canula / Face tent____% FiO2  Comfortable, no evidence of distress.  Using incentive spirometry & doing                ml  Monitor chest tube output  Chest tube to suction / water seal	    Mechanical Ventilation:    Mechanical ventilator status assessed & settings reviewed  Continue bronchodilators, pulmonary toilet  Head of bed elevation to 30-40 degrees    ====================CARDIOVASCULAR=====================  Continue hemodynamic monitoring/ telemetry  Not on any pressors  Continue cardiovascular / antihypertensive medications    ===================== RENAL ============================  Continue LR 30CC/hr      D/C IVF  Monitor I/Os, BUN/ Cr  and electrolytes  D/C [place]      Keep Pickens  BPH: Continue Flomax/ Finasteride      ==================== GASTROINTESTINAL===================  On regular diet, tolerating well  Continue GI prophylaxis with Pepcid / Protonix  Continue Zofran / Reglan for nausea - PRN	  NPO    =======================    ENDOCRIN  =====================  Glycemic monitoring  F/S with coverage  ===================HEMATOLOGIC/ONCOLOGIC =============  Monitor chest tube output. No signs of active bleeding.   Follow CBC, coags  in AM  DVT prophylaxis with SCD, sc Heparin    ========================INFECTIOUS DISEASE===============  No signs of infection. Monitor for fever / leukocytosis.  All surgical incision / chest tube  sites look clean  D/C [place]      Pertinent clinical, laboratory, radiographic, hemodynamic, echocardiographic, respiratory data, microbiologic data and chart were reviewed and analyzed frequently throughout the course of the day and night. GI and DVT prophylaxis, glycemic control, head of bed elevation and skin care issues were addressed.  Patient seen, examined and plan discussed with CT Surgery / CTICU team during rounds.         I have spent               minutes of critical care time with this pt between            am/pm    and               am/ pm         minutes spent on total encounter; more than 50% of the visit was spent counseling and/or coordinating care by the attending physician.        KIM Robbins MD [facility]SC[facility]ACKO          MRN-[id number]      47 M PMH of sarcoidosis, HTN, COPD, and asthma who was initially admitted on 09/10 w cc of severe leg spasms X two days  In E.D. pt found to be wheezing, seen by Pulmonary and started on IV Steroids for sarcoidosis  On 9/12 at 4 am, a RRT and Stroke codes were called as the patient became unresponsive.  He was transferred to MICU and then intubated for airway protection.   The patient was found to have a new infarct on MRI and also found to have a patent PFO on this admission as well. He was weaned off vent on 09/13 and transferred to the floors   There was concern patient was having paroxysmal afib, so he was started on Eliquis. He has received 2 doses of Eliquis on 09/19.   However, on 09/20, the patient had a coughing episode, which was not unusual for him, but during this episode he began to cough up fresh blood, about 1/2 cup of blood. He underwent Bronchoscopy with bronchoalveolar lavage of both sides and epi injection on 09/21 and was to have received further IR embolization for hemoptysis control in the next few days.  However, he developed hemoptysis again and underwent urgent DAVID lobectomy, complicated with post op bleeding.    Left VATS and DAVID lobectomy 9/24/17 [POD # 32]  Re-op L thoracotomy, evacuation of hemothorax (600cc clot, 400cc blood) no obvious site of bleeding  9/25/17 [POD # 31]  Re-op L thoracotomy, evacuation of hemothorax; RTOR x2 100cc clot; 200cc blood: bleeding posterior intercostal arteries identified and cauterized 9/25/17 [POD # 31]  Pt was transferred to ICU because of increased SOB on the floor. Pt also had increased left apical pneumo for which I.R put a Pigtail catheter. [POD # 6 (10/06/17)]   (Transferred back to ICU 2/2 increased SOB on telemetry and increased left apical ptx)  10/23/17 bedside talc pleurodesis  10/25/17  Pt was brought to ICU for acute SOB / Hypotension / Tachycardia / acute renal failure.     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 OOB to chair, breathing comfortably with minimal pain. Ambulated several times in the room. Denies pain, minimal SOB, no palpitations, no nausea/ no vomiting, no dizziness       PAST MEDICAL & SURGICAL HISTORY:  History of pneumothorax: History of 3 prior pneumonthoracies.  COPD (chronic obstructive pulmonary disease)  Asthma  Hypertension  Sarcoidosis  No significant past surgical history    Allergies   No Known Allergies          ***VITAL SIGNS:  Vital Signs Last 24 Hrs  T(C): 36.6 (26 Oct 2017 08:00), Max: 37.9 (25 Oct 2017 20:00)  T(F): 97.9 (26 Oct 2017 08:00), Max: 100.3 (25 Oct 2017 20:00)  HR: 83 (26 Oct 2017 11:00) (78 - 130)  BP: 90/60 (26 Oct 2017 11:00) (78/46 - 152/90)  BP(mean): 65 (26 Oct 2017 11:00) (54 - 105)  RR: 18 (26 Oct 2017 11:00) (16 - 32)  SpO2: 97% (26 Oct 2017 11:00) (92% - 99%)    I/Os:   I&O's Detail    25 Oct 2017 07:01  -  26 Oct 2017 07:00  --------------------------------------------------------  IN:    IV PiggyBack: 1000 mL    Oral Fluid: 240 mL    phenylephrine   Infusion: 95.5 mL  Total IN: 1335.5 mL    OUT:    Chest Tube: 85 mL    Voided: 1825 mL  Total OUT: 1910 mL    Total NET: -574.5 mL      26 Oct 2017 07:01  -  26 Oct 2017 11:31  --------------------------------------------------------  IN:    Oral Fluid: 240 mL    phenylephrine   Infusion: 20.8 mL  Total IN: 260.8 mL    OUT:    Chest Tube: 10 mL  Total OUT: 10 mL    Total NET: 250.8 mL        CAPILLARY BLOOD GLUCOSE          =======================  MEDICATIONS  ===================  MEDICATIONS  (STANDING):  ALPRAZolam 0.25 milliGRAM(s) Oral every 8 hours  apixaban 5 milliGRAM(s) Oral every 12 hours  atorvastatin 80 milliGRAM(s) Oral at bedtime  buDESOnide 160 MICROgram(s)/formoterol 4.5 MICROgram(s) Inhaler 2 Puff(s) Inhalation every 12 hours  diVALproex  milliGRAM(s) Oral two times a day  docusate sodium 100 milliGRAM(s) Oral two times a day  ferrous    sulfate 325 milliGRAM(s) Oral every 8 hours  folic acid 1 milliGRAM(s) Oral daily  ipratropium    for Nebulization 500 MICROGram(s) Nebulizer every 6 hours  levalbuterol Inhalation 0.63 milliGRAM(s) Inhalation every 6 hours  methylPREDNISolone sodium succinate Injectable 20 milliGRAM(s) IV Push every 8 hours  metoprolol     tartrate 12.5 milliGRAM(s) Oral every 12 hours  midodrine 5 milliGRAM(s) Oral every 8 hours  pantoprazole    Tablet 40 milliGRAM(s) Oral two times a day before meals  phenylephrine    Infusion 0.3 MICROgram(s)/kG/Min (10.406 mL/Hr) IV Continuous  polyethylene glycol 3350 17 Gram(s) Oral at bedtime  voriconazole 200 milliGRAM(s) Oral every 12 hours    MEDICATIONS  (PRN):  acetaminophen    Suspension 650 milliGRAM(s) Oral every 6 hours PRN For Temp greater than 38 C (100.4 F)  acetaminophen   Tablet. 650 milliGRAM(s) Oral every 6 hours PRN Mild Pain (1 - 3)  aluminum hydroxide/magnesium hydroxide/simethicone Suspension 30 milliLiter(s) Oral every 6 hours PRN Dyspepsia  HYDROmorphone  Injectable 1 milliGRAM(s) IV Push every 4 hours PRN Severe Pain (7 - 10)  temazepam 15 milliGRAM(s) Oral at bedtime PRN Insomnia        =================== PATIENT CARE ACCESS DEVICES ==========  Peripheral IV (+)     ======================= PHYSICAL EXAM===================  General:            Comfortable, Awake, alert, not in any distress  Neuro:              Moving all extremities to commands. Minimal   residual LUE and LLE weakness	  HEENT:              GIOVANI  Respiratory:	Lungs  on auscultation bilaterally with good aeration.                            (+) fine rhonchi, no wheezing. Effort even and unlabored.                            LEft anterior pigtail in place - no air leak  CV:		Regular rate and rhythm. Normal S1/S2. No murmurs  Abdomen:	Soft,  nontender, not-distended. Bowel sounds present   Skin:		No rash.  Extremities:	Warm, no cyanosis or edema.  Palpable pulses    ============================ LABS =======================                        11.1   14.67 )-----------( 58       ( 26 Oct 2017 06:35 )             35.3       139  |  99  |  22  ----------------------<  142<H>  5.1   |  29  |  0.94    Ca    9.2      26 Oct 2017 06:35  Phos  3.3     10-26  Mg     2.1     10-26    ABG - ( 25 Oct 2017 16:00 )  pH: 7.30  /  pCO2: 61    /  pO2: 158   / HCO3: 26    / Base Excess: 3.3   /  SaO2: 99.8        ===================== IMAGING STUDIES ===================  Radiology personally reviewed.  < from: Xray Chest 1 View AP -PORTABLE-Routine (10.26.17 @ 08:07) >  PROCEDURE DATE:  Oct 25 2017         INTERPRETATION:  TIME OF EXAM: October 25, 2017 at 5:20 PM.    CLINICAL INFORMATION: Status post VATS.    COMPARISON:  October 25, 2017 at 6:38 AM.    TECHNIQUE:   AP Portable chest x-ray.    INTERPRETATION:     The heart is not enlarged.  Bilateral lung opacities are not significantly changed.  A trace right pleural effusion is unchanged.  Left lung postsurgical change again noted.  Left pleural pigtail catheter is unchanged in position.  No significant change in left hydropneumothorax.      IMPRESSION:  No significant change.    Left pleural pigtail catheter unchanged.    Unchanged left hydropneumothorax and trace right pleural effusion.    Unchanged bilateral lung opacities.        < end of copied text >    ====================ASSESSMENT AND PLAN ================    47 M PMH of sarcoidosis, HTN, COPD, and asthma - now with prolonged hospital stay after complicated recovery post thoracic surgery:  Left VATS and DAVID lobectomy 9/24/17 [POD # 32]  Re-op L thoracotomy, evacuation of hemothorax (600cc clot, 400cc blood) no obvious site of bleeding  9/25/17 [POD # 31]  Re-op L thoracotomy, evacuation of hemothorax; RTOR x2 100cc clot; 200cc blood: bleeding posterior intercostal arteries identified and cauterized 9/25/17 [POD # 31]  Pt was transferred to ICU because of increased SOB on the floor. Pt also had increased left apical pneumo for which I.R put a Pigtail catheter. 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  Using incentive spirometry & doing   500-750 ml  Monitor chest tube output  Chest tube to suction 	  Continue bronchodilators, pulmonary toilet  Head of bed elevation to 30-40 degrees    ====================CARDIOVASCULAR=====================  Continue hemodynamic monitoring/ telemetry  Tapering pressors for SBP > 90, MAP > 65  Continue cardiovascular / antihypertensive medications    ===================== RENAL ============================  Continue LR 30CC/hr      D/C IVF  Monitor I/Os, BUN/ Cr  and electrolytes  D/C [place]      Keep Pickens  BPH: Continue Flomax/ Finasteride      ==================== GASTROINTESTINAL===================  On regular diet, tolerating well  Continue GI prophylaxis with Pepcid / Protonix  Continue Zofran / Reglan for nausea - PRN	  NPO    =======================    ENDOCRIN  =====================  Glycemic monitoring  F/S with coverage  ===================HEMATOLOGIC/ONCOLOGIC =============  Monitor chest tube output. No signs of active bleeding.   Follow CBC, coags  in AM  DVT prophylaxis with SCD, sc Heparin    ========================INFECTIOUS DISEASE===============  No signs of infection. Monitor for fever / leukocytosis.  All surgical incision / chest tube  sites look clean  D/C [place]      Pertinent clinical, laboratory, radiographic, hemodynamic, echocardiographic, respiratory data, microbiologic data and chart were reviewed and analyzed frequently throughout the course of the day and night. GI and DVT prophylaxis, glycemic control, head of bed elevation and skin care issues were addressed.  Patient seen, examined and plan discussed with CT Surgery / CTICU team during rounds.         I have spent               minutes of critical care time with this pt between            am/pm    and               am/ pm         minutes spent on total encounter; more than 50% of the visit was spent counseling and/or coordinating care by the attending physician.        KIM Robbins MD [facility]          MRN-[id number]      47 M PMH of sarcoidosis, HTN, COPD, and asthma who was initially admitted on 09/10 w cc of severe leg spasms X two days  In E.D. pt found to be wheezing, seen by Pulmonary and started on IV Steroids for sarcoidosis  On 9/12 at 4 am, a RRT and Stroke codes were called as the patient became unresponsive.  He was transferred to MICU and then intubated for airway protection.   The patient was found to have a new infarct on MRI and also found to have a patent PFO on this admission as well. He was weaned off vent on 09/13 and transferred to the floors   There was concern patient was having paroxysmal afib, so he was started on Eliquis. He has received 2 doses of Eliquis on 09/19.   However, on 09/20, the patient had a coughing episode, which was not unusual for him, but during this episode he began to cough up fresh blood, about 1/2 cup of blood. He underwent Bronchoscopy with bronchoalveolar lavage of both sides and epi injection on 09/21 and was to have received further IR embolization for hemoptysis control in the next few days.  However, he developed hemoptysis again and underwent urgent DAVID lobectomy, complicated with post op bleeding.    Left VATS and DAVID lobectomy 9/24/17 [POD # 32]  Re-op L thoracotomy, evacuation of hemothorax (600cc clot, 400cc blood) no obvious site of bleeding  9/25/17 [POD # 31]  Re-op L thoracotomy, evacuation of hemothorax; RTOR x2 100cc clot; 200cc blood: bleeding posterior intercostal arteries identified and cauterized 9/25/17 [POD # 31]  Pt was transferred to ICU because of increased SOB on the floor. Pt also had increased left apical pneumo for which I.R put a Pigtail catheter. [POD # 6 (10/06/17)]   (Transferred back to ICU 2/2 increased SOB on telemetry and increased left apical ptx)  10/23/17 bedside talc pleurodesis  10/25/17  Pt was brought to ICU for acute SOB / Hypotension / Tachycardia / acute renal failure.     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 OOB to chair, breathing comfortably with minimal pain. Ambulated several times in the room. Denies pain, minimal SOB, no palpitations, no nausea/ no vomiting, no dizziness       PAST MEDICAL & SURGICAL HISTORY:  History of pneumothorax: History of 3 prior pneumonthoracies.  COPD (chronic obstructive pulmonary disease)  Asthma  Hypertension  Sarcoidosis  No significant past surgical history    Allergies   No Known Allergies          ***VITAL SIGNS:  Vital Signs Last 24 Hrs  T(C): 36.6 (26 Oct 2017 08:00), Max: 37.9 (25 Oct 2017 20:00)  T(F): 97.9 (26 Oct 2017 08:00), Max: 100.3 (25 Oct 2017 20:00)  HR: 83 (26 Oct 2017 11:00) (78 - 130)  BP: 90/60 (26 Oct 2017 11:00) (78/46 - 152/90)  BP(mean): 65 (26 Oct 2017 11:00) (54 - 105)  RR: 18 (26 Oct 2017 11:00) (16 - 32)  SpO2: 97% (26 Oct 2017 11:00) (92% - 99%)    I/Os:   I&O's Detail    25 Oct 2017 07:01  -  26 Oct 2017 07:00  --------------------------------------------------------  IN:    IV PiggyBack: 1000 mL    Oral Fluid: 240 mL    phenylephrine   Infusion: 95.5 mL  Total IN: 1335.5 mL    OUT:    Chest Tube: 85 mL    Voided: 1825 mL  Total OUT: 1910 mL    Total NET: -574.5 mL      26 Oct 2017 07:01  -  26 Oct 2017 11:31  --------------------------------------------------------  IN:    Oral Fluid: 240 mL    phenylephrine   Infusion: 20.8 mL  Total IN: 260.8 mL    OUT:    Chest Tube: 10 mL  Total OUT: 10 mL    Total NET: 250.8 mL        CAPILLARY BLOOD GLUCOSE          =======================  MEDICATIONS  ===================  MEDICATIONS  (STANDING):  ALPRAZolam 0.25 milliGRAM(s) Oral every 8 hours  apixaban 5 milliGRAM(s) Oral every 12 hours  atorvastatin 80 milliGRAM(s) Oral at bedtime  buDESOnide 160 MICROgram(s)/formoterol 4.5 MICROgram(s) Inhaler 2 Puff(s) Inhalation every 12 hours  diVALproex  milliGRAM(s) Oral two times a day  docusate sodium 100 milliGRAM(s) Oral two times a day  ferrous    sulfate 325 milliGRAM(s) Oral every 8 hours  folic acid 1 milliGRAM(s) Oral daily  ipratropium    for Nebulization 500 MICROGram(s) Nebulizer every 6 hours  levalbuterol Inhalation 0.63 milliGRAM(s) Inhalation every 6 hours  methylPREDNISolone sodium succinate Injectable 20 milliGRAM(s) IV Push every 8 hours  metoprolol     tartrate 12.5 milliGRAM(s) Oral every 12 hours  midodrine 5 milliGRAM(s) Oral every 8 hours  pantoprazole    Tablet 40 milliGRAM(s) Oral two times a day before meals  phenylephrine    Infusion 0.3 MICROgram(s)/kG/Min (10.406 mL/Hr) IV Continuous  polyethylene glycol 3350 17 Gram(s) Oral at bedtime  voriconazole 200 milliGRAM(s) Oral every 12 hours    MEDICATIONS  (PRN):  acetaminophen    Suspension 650 milliGRAM(s) Oral every 6 hours PRN For Temp greater than 38 C (100.4 F)  acetaminophen   Tablet. 650 milliGRAM(s) Oral every 6 hours PRN Mild Pain (1 - 3)  aluminum hydroxide/magnesium hydroxide/simethicone Suspension 30 milliLiter(s) Oral every 6 hours PRN Dyspepsia  HYDROmorphone  Injectable 1 milliGRAM(s) IV Push every 4 hours PRN Severe Pain (7 - 10)  temazepam 15 milliGRAM(s) Oral at bedtime PRN Insomnia        =================== PATIENT CARE ACCESS DEVICES ==========  Peripheral IV (+)     ======================= PHYSICAL EXAM===================  General:            Comfortable, Awake, alert, not in any distress  Neuro:              Moving all extremities to commands. Minimal   residual LUE and LLE weakness	  HEENT:              GIOVANI  Respiratory:	Lungs  on auscultation bilaterally with good aeration.                            (+) fine rhonchi, no wheezing. Effort even and unlabored.                            LEft anterior pigtail in place - no air leak  CV:		Regular rate and rhythm. Normal S1/S2. No murmurs  Abdomen:	Soft,  nontender, not-distended. Bowel sounds present   Skin:		No rash.  Extremities:	Warm, no cyanosis or edema.  Palpable pulses    ============================ LABS =======================                        11.1   14.67 )-----------( 58       ( 26 Oct 2017 06:35 )             35.3       139  |  99  |  22  ----------------------<  142<H>  5.1   |  29  |  0.94    Ca    9.2      26 Oct 2017 06:35  Phos  3.3     10-26  Mg     2.1     10-26    ABG - ( 25 Oct 2017 16:00 )  pH: 7.30  /  pCO2: 61    /  pO2: 158   / HCO3: 26    / Base Excess: 3.3   /  SaO2: 99.8        ===================== IMAGING STUDIES ===================  Radiology personally reviewed.  < from: Xray Chest 1 View AP -PORTABLE-Routine (10.26.17 @ 08:07) >  PROCEDURE DATE:  Oct 25 2017         INTERPRETATION:  TIME OF EXAM: October 25, 2017 at 5:20 PM.    CLINICAL INFORMATION: Status post VATS.    COMPARISON:  October 25, 2017 at 6:38 AM.    TECHNIQUE:   AP Portable chest x-ray.    INTERPRETATION:     The heart is not enlarged.  Bilateral lung opacities are not significantly changed.  A trace right pleural effusion is unchanged.  Left lung postsurgical change again noted.  Left pleural pigtail catheter is unchanged in position.  No significant change in left hydropneumothorax.      IMPRESSION:  No significant change.    Left pleural pigtail catheter unchanged.    Unchanged left hydropneumothorax and trace right pleural effusion.    Unchanged bilateral lung opacities.        < end of copied text >    ====================ASSESSMENT AND PLAN ================    47 M PMH of sarcoidosis, HTN, COPD, and asthma - now with prolonged hospital stay after complicated recovery post thoracic surgery:  Left VATS and DAVID lobectomy 9/24/17 [POD # 32]  Re-op L thoracotomy, evacuation of hemothorax (600cc clot, 400cc blood) no obvious site of bleeding  9/25/17 [POD # 31]  Re-op L thoracotomy, evacuation of hemothorax; RTOR x2 100cc clot; 200cc blood: bleeding posterior intercostal arteries identified and cauterized 9/25/17 [POD # 31]  Pt was transferred to ICU because of increased SOB on the floor. Pt also had increased left apical pneumo for which I.R put a Pigtail catheter. 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  Using incentive spirometry & doing   500-750 ml  Monitor chest tube output  Chest tube to suction 	  Continue bronchodilators, pulmonary toilet  Head of bed elevation to 30-40 degrees  Voriconazole for Aspergillosis  Solumedrol decreased  to 20 mg Q 8     ====================CARDIOVASCULAR=====================  Continue hemodynamic monitoring/ telemetry  Tapering pressors for SBP > 90, MAP > 65 ( IV Phenylephrine, PO Midodrine)  Apixaban PO for PFO AC    ===================== RENAL ============================  Continue LR KVO    Monitor I/Os, BUN/ Cr  and electrolytes    ==================== GASTROINTESTINAL===================  On regular diet, tolerating well  Continue GI prophylaxis with Protonix  Zofran / Reglan for nausea - PRN	    =======================    ENDOCRIN  =====================  Glycemic monitoring  F/S with coverage  ===================HEMATOLOGIC/ONCOLOGIC =============  Monitor chest tube output. No signs of active bleeding.   Follow CBC, coags  in AM  DVT prophylaxis with SCD, sc Heparin    ========================INFECTIOUS DISEASE===============  No signs of acute  infection. Monitor for fever / leukocytosis.  All surgical incision / chest tube  sites look clean  Voriconazole for Aspergillosis      Pertinent clinical, laboratory, radiographic, hemodynamic, echocardiographic, respiratory data, microbiologic data and chart were reviewed and analyzed frequently throughout the course of the day and night. GI and DVT prophylaxis, glycemic control, head of bed elevation and skin care issues were addressed.  Patient seen, examined and plan discussed with CT Surgery / CTICU team during rounds.         I have spent  45   minutes of critical care time with this pt between 7 am/pm    and  1  pm         minutes spent on total encounter; more than 50% of the visit was spent counseling and/or coordinating care by the attending physician.        KIM Robbins MD [facility]          MRN-[id number]      47 M PMH of sarcoidosis, HTN, COPD, and asthma who was initially admitted on 09/10 w cc of severe leg spasms X two days  In E.D. pt found to be wheezing, seen by Pulmonary and started on IV Steroids for sarcoidosis  On 9/12 at 4 am, a RRT and Stroke codes were called as the patient became unresponsive.  He was transferred to MICU and then intubated for airway protection.   The patient was found to have a new infarct on MRI and also found to have a patent PFO on this admission as well. He was weaned off vent on 09/13 and transferred to the floors   There was concern patient was having paroxysmal afib, so he was started on Eliquis. He has received 2 doses of Eliquis on 09/19.   However, on 09/20, the patient had a coughing episode, which was not unusual for him, but during this episode he began to cough up fresh blood, about 1/2 cup of blood. He underwent Bronchoscopy with bronchoalveolar lavage of both sides and epi injection on 09/21 and was to have received further IR embolization for hemoptysis control in the next few days.  However, he developed hemoptysis again and underwent urgent DAVID lobectomy, complicated with post op bleeding.    Left VATS and DAVID lobectomy 9/24/17 [POD # 32]  Re-op L thoracotomy, evacuation of hemothorax (600cc clot, 400cc blood) no obvious site of bleeding  9/25/17 [POD # 31]  Re-op L thoracotomy, evacuation of hemothorax; RTOR x2 100cc clot; 200cc blood: bleeding posterior intercostal arteries identified and cauterized 9/25/17 [POD # 31]  Pt was transferred to ICU because of increased SOB on the floor. Pt also had increased left apical pneumo for which I.R put a Pigtail catheter. [POD # 6 (10/06/17)]   (Transferred back to ICU 2/2 increased SOB on telemetry and increased left apical ptx)  10/23/17 bedside talc pleurodesis  10/25/17  Pt was brought to ICU for acute SOB / Hypotension / Tachycardia / acute renal failure.     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 OOB to chair, breathing comfortably with minimal pain. Ambulated several times in the room. Denies pain, minimal SOB, no palpitations, no nausea/ no vomiting, no dizziness       PAST MEDICAL & SURGICAL HISTORY:  History of pneumothorax: History of 3 prior pneumonthoracies.  COPD (chronic obstructive pulmonary disease)  Asthma  Hypertension  Sarcoidosis  No significant past surgical history    Allergies   No Known Allergies          ***VITAL SIGNS:  Vital Signs Last 24 Hrs  T(C): 36.6 (26 Oct 2017 08:00), Max: 37.9 (25 Oct 2017 20:00)  T(F): 97.9 (26 Oct 2017 08:00), Max: 100.3 (25 Oct 2017 20:00)  HR: 83 (26 Oct 2017 11:00) (78 - 130)  BP: 90/60 (26 Oct 2017 11:00) (78/46 - 152/90)  BP(mean): 65 (26 Oct 2017 11:00) (54 - 105)  RR: 18 (26 Oct 2017 11:00) (16 - 32)  SpO2: 97% (26 Oct 2017 11:00) (92% - 99%)    I/Os:   I&O's Detail    25 Oct 2017 07:01  -  26 Oct 2017 07:00  --------------------------------------------------------  IN:    IV PiggyBack: 1000 mL    Oral Fluid: 240 mL    phenylephrine   Infusion: 95.5 mL  Total IN: 1335.5 mL    OUT:    Chest Tube: 85 mL    Voided: 1825 mL  Total OUT: 1910 mL    Total NET: -574.5 mL      26 Oct 2017 07:01  -  26 Oct 2017 11:31  --------------------------------------------------------  IN:    Oral Fluid: 240 mL    phenylephrine   Infusion: 20.8 mL  Total IN: 260.8 mL    OUT:    Chest Tube: 10 mL  Total OUT: 10 mL    Total NET: 250.8 mL        CAPILLARY BLOOD GLUCOSE          =======================  MEDICATIONS  ===================  MEDICATIONS  (STANDING):  ALPRAZolam 0.25 milliGRAM(s) Oral every 8 hours  apixaban 5 milliGRAM(s) Oral every 12 hours  atorvastatin 80 milliGRAM(s) Oral at bedtime  buDESOnide 160 MICROgram(s)/formoterol 4.5 MICROgram(s) Inhaler 2 Puff(s) Inhalation every 12 hours  diVALproex  milliGRAM(s) Oral two times a day  docusate sodium 100 milliGRAM(s) Oral two times a day  ferrous    sulfate 325 milliGRAM(s) Oral every 8 hours  folic acid 1 milliGRAM(s) Oral daily  ipratropium    for Nebulization 500 MICROGram(s) Nebulizer every 6 hours  levalbuterol Inhalation 0.63 milliGRAM(s) Inhalation every 6 hours  methylPREDNISolone sodium succinate Injectable 20 milliGRAM(s) IV Push every 8 hours  metoprolol     tartrate 12.5 milliGRAM(s) Oral every 12 hours  midodrine 5 milliGRAM(s) Oral every 8 hours  pantoprazole    Tablet 40 milliGRAM(s) Oral two times a day before meals  phenylephrine    Infusion 0.3 MICROgram(s)/kG/Min (10.406 mL/Hr) IV Continuous  polyethylene glycol 3350 17 Gram(s) Oral at bedtime  voriconazole 200 milliGRAM(s) Oral every 12 hours    MEDICATIONS  (PRN):  acetaminophen    Suspension 650 milliGRAM(s) Oral every 6 hours PRN For Temp greater than 38 C (100.4 F)  acetaminophen   Tablet. 650 milliGRAM(s) Oral every 6 hours PRN Mild Pain (1 - 3)  aluminum hydroxide/magnesium hydroxide/simethicone Suspension 30 milliLiter(s) Oral every 6 hours PRN Dyspepsia  HYDROmorphone  Injectable 1 milliGRAM(s) IV Push every 4 hours PRN Severe Pain (7 - 10)  temazepam 15 milliGRAM(s) Oral at bedtime PRN Insomnia        =================== PATIENT CARE ACCESS DEVICES ==========  Peripheral IV (+)     ======================= PHYSICAL EXAM===================  General:            Comfortable, Awake, alert, not in any distress  Neuro:              Moving all extremities to commands. Minimal   residual LUE and LLE weakness	  HEENT:              GIOVANI  Respiratory:	Lungs  on auscultation bilaterally with good aeration.                            (+) fine rhonchi, no wheezing. Effort even and unlabored.                            LEft anterior pigtail in place - no air leak  CV:		Regular rate and rhythm. Normal S1/S2. No murmurs  Abdomen:	Soft,  nontender, not-distended. Bowel sounds present   Skin:		No rash.  Extremities:	Warm, no cyanosis or edema.  Palpable pulses    ============================ LABS =======================                        11.1   14.67 )-----------( 58       ( 26 Oct 2017 06:35 )             35.3       139  |  99  |  22  ----------------------<  142<H>  5.1   |  29  |  0.94    Ca    9.2      26 Oct 2017 06:35  Phos  3.3     10-26  Mg     2.1     10-26    ABG - ( 25 Oct 2017 16:00 )  pH: 7.30  /  pCO2: 61    /  pO2: 158   / HCO3: 26    / Base Excess: 3.3   /  SaO2: 99.8        ===================== IMAGING STUDIES ===================  Radiology personally reviewed.  < from: Xray Chest 1 View AP -PORTABLE-Routine (10.26.17 @ 08:07) >  PROCEDURE DATE:  Oct 25 2017         INTERPRETATION:  TIME OF EXAM: October 25, 2017 at 5:20 PM.    CLINICAL INFORMATION: Status post VATS.    COMPARISON:  October 25, 2017 at 6:38 AM.    TECHNIQUE:   AP Portable chest x-ray.    INTERPRETATION:     The heart is not enlarged.  Bilateral lung opacities are not significantly changed.  A trace right pleural effusion is unchanged.  Left lung postsurgical change again noted.  Left pleural pigtail catheter is unchanged in position.  No significant change in left hydropneumothorax.      IMPRESSION:  No significant change.    Left pleural pigtail catheter unchanged.    Unchanged left hydropneumothorax and trace right pleural effusion.    Unchanged bilateral lung opacities.        < end of copied text >    ====================ASSESSMENT AND PLAN ================    47 M PMH of sarcoidosis, HTN, COPD, and asthma - now with prolonged hospital stay after complicated recovery post thoracic surgery:  Left VATS and DAVID lobectomy 9/24/17 [POD # 32]  Re-op L thoracotomy, evacuation of hemothorax (600cc clot, 400cc blood) no obvious site of bleeding  9/25/17 [POD # 31]  Re-op L thoracotomy, evacuation of hemothorax; RTOR x2 100cc clot; 200cc blood: bleeding posterior intercostal arteries identified and cauterized 9/25/17 [POD # 31]  Pt was transferred to ICU because of increased SOB on the floor. Pt also had increased left apical pneumo for which I.R put a Pigtail catheter. 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  Using incentive spirometry & doing   500-750 ml  Monitor chest tube output  Chest tube to suction 	  Continue bronchodilators, pulmonary toilet  Head of bed elevation to 30-40 degrees  Voriconazole for Aspergillosis  Solumedrol decreased  to 20 mg Q 8     ====================CARDIOVASCULAR=====================  Continue hemodynamic monitoring/ telemetry  Tapering pressors for SBP > 90, MAP > 65 ( IV Phenylephrine, PO Midodrine)  Apixaban PO for PFO AC    ===================== RENAL ============================  Continue LR KVO    Monitor I/Os, BUN/ Cr  and electrolytes    ==================== GASTROINTESTINAL===================  On regular diet, tolerating well  Continue GI prophylaxis with Protonix  Zofran / Reglan for nausea - PRN	    =======================    ENDOCRIN  =====================  Glycemic monitoring  F/S with coverage  ===================HEMATOLOGIC/ONCOLOGIC =============  Monitor chest tube output. No signs of active bleeding.   Follow CBC, coags  in AM  C/w iron and folic acid for anemia ( multifactorial)  DVT prophylaxis with SCD, sc Heparin    ========================INFECTIOUS DISEASE===============  No signs of acute  infection. Monitor for fever / leukocytosis.  All surgical incision / chest tube  sites look clean  Voriconazole for Aspergillosis      Pertinent clinical, laboratory, radiographic, hemodynamic, echocardiographic, respiratory data, microbiologic data and chart were reviewed and analyzed frequently throughout the course of the day and night. GI and DVT prophylaxis, glycemic control, head of bed elevation and skin care issues were addressed.  Patient seen, examined and plan discussed with CT Surgery / CTICU team during rounds.         I have spent  45   minutes of critical care time with this pt between 7 am/pm    and  1  pm         minutes spent on total encounter; more than 50% of the visit was spent counseling and/or coordinating care by the attending physician.        KIM Robbins MD [facility]          MRN-[id number]      47 M PMH of sarcoidosis, HTN, COPD, and asthma who was initially admitted on 09/10 w cc of severe leg spasms X two days  In E.D. pt found to be wheezing, seen by Pulmonary and started on IV Steroids for sarcoidosis  On 9/12 at 4 am, a RRT and Stroke codes were called as the patient became unresponsive.  He was transferred to MICU and then intubated for airway protection.   The patient was found to have a new infarct on MRI and also found to have a patent PFO on this admission as well. He was weaned off vent on 09/13 and transferred to the floors   There was concern patient was having paroxysmal afib, so he was started on Eliquis. He has received 2 doses of Eliquis on 09/19.   However, on 09/20, the patient had a coughing episode, which was not unusual for him, but during this episode he began to cough up fresh blood, about 1/2 cup of blood. He underwent Bronchoscopy with bronchoalveolar lavage of both sides and epi injection on 09/21 and was to have received further IR embolization for hemoptysis control in the next few days.  However, he developed hemoptysis again and underwent urgent DAVID lobectomy, complicated with post op bleeding.    Left VATS and DAVID lobectomy 9/24/17 [POD # 32]  Re-op L thoracotomy, evacuation of hemothorax (600cc clot, 400cc blood) no obvious site of bleeding  9/25/17 [POD # 31]  Re-op L thoracotomy, evacuation of hemothorax; RTOR x2 100cc clot; 200cc blood: bleeding posterior intercostal arteries identified and cauterized 9/25/17 [POD # 31]  Pt was transferred to ICU because of increased SOB on the floor. Pt also had increased left apical pneumo for which I.R put a Pigtail catheter. [POD # 6 (10/06/17)]   (Transferred back to ICU 2/2 increased SOB on telemetry and increased left apical ptx)  10/23/17 bedside talc pleurodesis  10/25/17  Pt was brought to ICU for acute SOB / Hypotension / Tachycardia / acute renal failure.     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 OOB to chair, breathing comfortably with minimal pain. Ambulated several times in the room. Denies pain, minimal SOB, no palpitations, no nausea/ no vomiting, no dizziness       PAST MEDICAL & SURGICAL HISTORY:  History of pneumothorax: History of 3 prior pneumonthoracies.  COPD (chronic obstructive pulmonary disease)  Asthma  Hypertension  Sarcoidosis  No significant past surgical history    Allergies   No Known Allergies          ***VITAL SIGNS:  Vital Signs Last 24 Hrs  T(C): 36.6 (26 Oct 2017 08:00), Max: 37.9 (25 Oct 2017 20:00)  T(F): 97.9 (26 Oct 2017 08:00), Max: 100.3 (25 Oct 2017 20:00)  HR: 83 (26 Oct 2017 11:00) (78 - 130)  BP: 90/60 (26 Oct 2017 11:00) (78/46 - 152/90)  BP(mean): 65 (26 Oct 2017 11:00) (54 - 105)  RR: 18 (26 Oct 2017 11:00) (16 - 32)  SpO2: 97% (26 Oct 2017 11:00) (92% - 99%)    I/Os:   I&O's Detail    25 Oct 2017 07:01  -  26 Oct 2017 07:00  --------------------------------------------------------  IN:    IV PiggyBack: 1000 mL    Oral Fluid: 240 mL    phenylephrine   Infusion: 95.5 mL  Total IN: 1335.5 mL    OUT:    Chest Tube: 85 mL    Voided: 1825 mL  Total OUT: 1910 mL    Total NET: -574.5 mL      26 Oct 2017 07:01  -  26 Oct 2017 11:31  --------------------------------------------------------  IN:    Oral Fluid: 240 mL    phenylephrine   Infusion: 20.8 mL  Total IN: 260.8 mL    OUT:    Chest Tube: 10 mL  Total OUT: 10 mL    Total NET: 250.8 mL        CAPILLARY BLOOD GLUCOSE          =======================  MEDICATIONS  ===================  MEDICATIONS  (STANDING):  ALPRAZolam 0.25 milliGRAM(s) Oral every 8 hours  apixaban 5 milliGRAM(s) Oral every 12 hours  atorvastatin 80 milliGRAM(s) Oral at bedtime  buDESOnide 160 MICROgram(s)/formoterol 4.5 MICROgram(s) Inhaler 2 Puff(s) Inhalation every 12 hours  diVALproex  milliGRAM(s) Oral two times a day  docusate sodium 100 milliGRAM(s) Oral two times a day  ferrous    sulfate 325 milliGRAM(s) Oral every 8 hours  folic acid 1 milliGRAM(s) Oral daily  ipratropium    for Nebulization 500 MICROGram(s) Nebulizer every 6 hours  levalbuterol Inhalation 0.63 milliGRAM(s) Inhalation every 6 hours  methylPREDNISolone sodium succinate Injectable 20 milliGRAM(s) IV Push every 8 hours  metoprolol     tartrate 12.5 milliGRAM(s) Oral every 12 hours  midodrine 5 milliGRAM(s) Oral every 8 hours  pantoprazole    Tablet 40 milliGRAM(s) Oral two times a day before meals  phenylephrine    Infusion 0.3 MICROgram(s)/kG/Min (10.406 mL/Hr) IV Continuous  polyethylene glycol 3350 17 Gram(s) Oral at bedtime  voriconazole 200 milliGRAM(s) Oral every 12 hours    MEDICATIONS  (PRN):  acetaminophen    Suspension 650 milliGRAM(s) Oral every 6 hours PRN For Temp greater than 38 C (100.4 F)  acetaminophen   Tablet. 650 milliGRAM(s) Oral every 6 hours PRN Mild Pain (1 - 3)  aluminum hydroxide/magnesium hydroxide/simethicone Suspension 30 milliLiter(s) Oral every 6 hours PRN Dyspepsia  HYDROmorphone  Injectable 1 milliGRAM(s) IV Push every 4 hours PRN Severe Pain (7 - 10)  temazepam 15 milliGRAM(s) Oral at bedtime PRN Insomnia        =================== PATIENT CARE ACCESS DEVICES ==========  Peripheral IV (+)     ======================= PHYSICAL EXAM===================  General:            Comfortable, Awake, alert, not in any distress  Neuro:              Moving all extremities to commands. Minimal   residual LUE and LLE weakness	  HEENT:              GIOVANI  Respiratory:	Lungs  on auscultation bilaterally with good aeration.                            (+) fine rhonchi, no wheezing. Effort even and unlabored.                            LEft anterior pigtail in place - no air leak  CV:		Regular rate and rhythm. Normal S1/S2. No murmurs  Abdomen:	Soft,  nontender, not-distended. Bowel sounds present   Skin:		No rash.  Extremities:	Warm, no cyanosis or edema.  Palpable pulses    ============================ LABS =======================                        11.1   14.67 )-----------( 58       ( 26 Oct 2017 06:35 )             35.3       139  |  99  |  22  ----------------------<  142<H>  5.1   |  29  |  0.94    Ca    9.2      26 Oct 2017 06:35  Phos  3.3     10-26  Mg     2.1     10-26    ABG - ( 25 Oct 2017 16:00 )  pH: 7.30  /  pCO2: 61    /  pO2: 158   / HCO3: 26    / Base Excess: 3.3   /  SaO2: 99.8        ===================== IMAGING STUDIES ===================  Radiology personally reviewed.  < from: Xray Chest 1 View AP -PORTABLE-Routine (10.26.17 @ 08:07) >  PROCEDURE DATE:  Oct 25 2017         INTERPRETATION:  TIME OF EXAM: October 25, 2017 at 5:20 PM.    CLINICAL INFORMATION: Status post VATS.    COMPARISON:  October 25, 2017 at 6:38 AM.    TECHNIQUE:   AP Portable chest x-ray.    INTERPRETATION:     The heart is not enlarged.  Bilateral lung opacities are not significantly changed.  A trace right pleural effusion is unchanged.  Left lung postsurgical change again noted.  Left pleural pigtail catheter is unchanged in position.  No significant change in left hydropneumothorax.      IMPRESSION:  No significant change.    Left pleural pigtail catheter unchanged.    Unchanged left hydropneumothorax and trace right pleural effusion.    Unchanged bilateral lung opacities.        < end of copied text >    ====================ASSESSMENT AND PLAN ================    47 M PMH of sarcoidosis, HTN, COPD, and asthma - now with prolonged hospital stay after complicated recovery post thoracic surgery:  Left VATS and DAVID lobectomy 9/24/17 [POD # 32]  Re-op L thoracotomy, evacuation of hemothorax (600cc clot, 400cc blood) no obvious site of bleeding  9/25/17 [POD # 31]  Re-op L thoracotomy, evacuation of hemothorax; RTOR x2 100cc clot; 200cc blood: bleeding posterior intercostal arteries identified and cauterized 9/25/17 [POD # 31]  Pt was transferred to ICU because of increased SOB on the floor. Pt also had increased left apical pneumo for which I.R put a Pigtail catheter. 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  Using incentive spirometry & doing   500-750 ml  Monitor chest tube output  Chest tube to suction 	  Continue bronchodilators, pulmonary toilet  Head of bed elevation to 30-40 degrees  Voriconazole for Aspergillosis  Solumedrol decreased  to 20 mg Q 8     ====================CARDIOVASCULAR=====================  Continue hemodynamic monitoring/ telemetry  Tapering pressors for SBP > 90, MAP > 65 ( IV Phenylephrine, PO Midodrine)  Apixaban PO for PFO AC    ===================== RENAL ============================  Continue LR KVO    Monitor I/Os, BUN/ Cr  and electrolytes    ==================== GASTROINTESTINAL===================  On regular diet, tolerating well  Continue GI prophylaxis with Protonix  Zofran / Reglan for nausea - PRN	    =======================    ENDOCRIN  =====================  Glycemic monitoring  F/S with coverage  ===================HEMATOLOGIC/ONCOLOGIC =============  Monitor chest tube output. No signs of active bleeding.   Follow CBC, coags  in AM  C/w iron and folic acid for anemia ( multifactorial)  DVT prophylaxis with SCD, sc Heparin    ========================INFECTIOUS DISEASE===============  No signs of acute  infection. Monitor for fever / leukocytosis.  All surgical incision / chest tube  sites look clean  Voriconazole for Aspergillosis      Pertinent clinical, laboratory, radiographic, hemodynamic, echocardiographic, respiratory data, microbiologic data and chart were reviewed and analyzed frequently throughout the course of the day and night. GI and DVT prophylaxis, glycemic control, head of bed elevation and skin care issues were addressed.  Patient seen, examined and plan discussed with CT Surgery / CTICU team during rounds.         I have spent  45   minutes of critical care time with this pt between 7 am/pm    and  4  pm         minutes spent on total encounter; more than 50% of the visit was spent counseling and/or coordinating care by the attending physician.        KIM Robbins MD

## 2017-10-26 NOTE — CHART NOTE - NSCHARTNOTEFT_GEN_A_CORE
Source: Patient   Diet : Regular    Patient reports good appetite , PO good mostly and not able to take PO when breathing is difficult . Offered PO supplement but declined .    Current Weight: Weight (kg): 92.5 (10-25 ) ; Admit wt. - 97.1 kg ; 88.9 kg on 10/11/17; noted 2+ R & L foot 7 ankle edema     Pertinent Medications: MEDICATIONS  (STANDING):    ALPRAZolam 0.25 milliGRAM(s) Oral every 8 hours  apixaban 5 milliGRAM(s) Oral every 12 hours  atorvastatin 80 milliGRAM(s) Oral at bedtime  buDESOnide 160 MICROgram(s)/formoterol 4.5 MICROgram(s) Inhaler 2 Puff(s) Inhalation every 12 hours  diVALproex  milliGRAM(s) Oral two times a day  docusate sodium 100 milliGRAM(s) Oral two times a day  ferrous    sulfate 325 milliGRAM(s) Oral every 8 hours  folic acid 1 milliGRAM(s) Oral daily  ipratropium    for Nebulization 500 MICROGram(s) Nebulizer every 6 hours  levalbuterol Inhalation 0.63 milliGRAM(s) Inhalation every 6 hours  methylPREDNISolone sodium succinate Injectable 20 milliGRAM(s) IV Push every 8 hours  metoprolol     tartrate 12.5 milliGRAM(s) Oral every 12 hours  midodrine 5 milliGRAM(s) Oral every 8 hours  pantoprazole    Tablet 40 milliGRAM(s) Oral two times a day before meals  phenylephrine    Infusion 0.3 MICROgram(s)/kG/Min (10.406 mL/Hr) IV Continuous <Continuous>  polyethylene glycol 3350 17 Gram(s) Oral at bedtime  voriconazole 200 milliGRAM(s) Oral every 12 hours    MEDICATIONS  (PRN):  acetaminophen    Suspension 650 milliGRAM(s) Oral every 6 hours PRN For Temp greater than 38 C (100.4 F)  acetaminophen   Tablet. 650 milliGRAM(s) Oral every 6 hours PRN Mild Pain (1 - 3)  aluminum hydroxide/magnesium hydroxide/simethicone Suspension 30 milliLiter(s) Oral every 6 hours PRN Dyspepsia  HYDROmorphone  Injectable 1 milliGRAM(s) IV Push every 4 hours PRN Severe Pain (7 - 10)  temazepam 15 milliGRAM(s) Oral at bedtime PRN Insomnia    Pertinent Labs:  10-26 Na139 mmol/L Glu 142 mg/dL<H> K+ 5.1 mmol/L Cr  0.94 mg/dL BUN 22 mg/dL Phos 3.3 mg/dL Alb n/a   PAB n/a         Skin: intact    Recommend to continue Regular diet        Monitoring and Evaluation:     1. PO intake   2. Tolerance to diet prescription [  3. weights   4. follow up per protocol

## 2017-10-26 NOTE — PROGRESS NOTE ADULT - PROBLEM SELECTOR PLAN 1
s/p blood patch, but still with air leak: defer to thoracici surgery  10/22: increased on yesterdays chest -ray: failed blood patch.....next step? pleurodesis  10/24: s/p pleurodesis: air leaking:  10/25: in the afternoon he became acutely hypercarbic: transfered down to cticu: will need bipap help: plus also he is febrile? talc reaction: have a low threshold to starting antibiotcs  10/26: s/p pleurodesis: developed inflammatory response: currently on low dose vasopressors : defer to thoracic surgery

## 2017-10-26 NOTE — PROGRESS NOTE ADULT - SUBJECTIVE AND OBJECTIVE BOX
Patient is a 47y old  Male who presents with a chief complaint of "My leg wouldn't stop shaking." (14 Sep 2017 09:59)    pt is doing ok  no sob at rest  yesterday was transferred to [facility]ICU for ac hypercarbic respiratory failure   Any change in ROS:     MEDICATIONS  (STANDING):  ALPRAZolam 0.25 milliGRAM(s) Oral every 8 hours  apixaban 5 milliGRAM(s) Oral every 12 hours  atorvastatin 80 milliGRAM(s) Oral at bedtime  buDESOnide 160 MICROgram(s)/formoterol 4.5 MICROgram(s) Inhaler 2 Puff(s) Inhalation every 12 hours  diVALproex  milliGRAM(s) Oral two times a day  docusate sodium 100 milliGRAM(s) Oral two times a day  ferrous    sulfate 325 milliGRAM(s) Oral every 8 hours  folic acid 1 milliGRAM(s) Oral daily  ipratropium    for Nebulization 500 MICROGram(s) Nebulizer every 6 hours  levalbuterol Inhalation 0.63 milliGRAM(s) Inhalation every 6 hours  methylPREDNISolone sodium succinate Injectable 20 milliGRAM(s) IV Push every 8 hours  metoprolol     tartrate 12.5 milliGRAM(s) Oral every 12 hours  midodrine 5 milliGRAM(s) Oral every 8 hours  pantoprazole    Tablet 40 milliGRAM(s) Oral two times a day before meals  phenylephrine    Infusion 0.3 MICROgram(s)/kG/Min (10.406 mL/Hr) IV Continuous <Continuous>  polyethylene glycol 3350 17 Gram(s) Oral at bedtime  voriconazole 200 milliGRAM(s) Oral every 12 hours    MEDICATIONS  (PRN):  acetaminophen    Suspension 650 milliGRAM(s) Oral every 6 hours PRN For Temp greater than 38 C (100.4 F)  acetaminophen   Tablet. 650 milliGRAM(s) Oral every 6 hours PRN Mild Pain (1 - 3)  aluminum hydroxide/magnesium hydroxide/simethicone Suspension 30 milliLiter(s) Oral every 6 hours PRN Dyspepsia  HYDROmorphone  Injectable 1 milliGRAM(s) IV Push every 4 hours PRN Severe Pain (7 - 10)  temazepam 15 milliGRAM(s) Oral at bedtime PRN Insomnia    Vital Signs Last 24 Hrs  T(C): 36.5 (26 Oct 2017 12:00), Max: 37.9 (25 Oct 2017 20:00)  T(F): 97.7 (26 Oct 2017 12:00), Max: 100.3 (25 Oct 2017 20:00)  HR: 89 (26 Oct 2017 13:00) (78 - 130)  BP: 112/77 (26 Oct 2017 13:00) (78/46 - 152/90)  BP(mean): 82 (26 Oct 2017 13:00) (54 - 105)  RR: 18 (26 Oct 2017 13:00) (16 - 32)  SpO2: 96% (26 Oct 2017 13:00) (92% - 100%)    I&O's Summary    25 Oct 2017 07:01  -  26 Oct 2017 07:00  --------------------------------------------------------  IN: 1335.5 mL / OUT: 1910 mL / NET: -574.5 mL    26 Oct 2017 07:01  -  26 Oct 2017 13:38  --------------------------------------------------------  IN: 514.6 mL / OUT: 10 mL / NET: 504.6 mL          Physical Exam:   GENERAL: NAD, well-groomed, well-developed  HEENT: GIOVANI/   Atraumatic, Normocephalic  ENMT: No tonsillar erythema, exudates, or enlargement; Moist mucous membranes, Good dentition, No lesions  NECK: Supple, No JVD, Normal thyroid  CHEST/LUNG: poor resp excursion: coarse rhonchi   CVS: Regular rate and rhythm; No murmurs, rubs, or gallops  GI: : Soft, Nontender, Nondistended; Bowel sounds present  NERVOUS SYSTEM:  Alert & Oriented X3  EXTREMITIES:  2+ Peripheral Pulses, No clubbing, cyanosis, or edema  LYMPH: No lymphadenopathy noted  SKIN: No rashes or lesions  ENDOCRINOLOGY: No Thyromegaly  PSYCH: Appropriate    Labs:  ABG - ( 25 Oct 2017 16:00 )  pH: 7.30  /  pCO2: 61    /  pO2: 158   / HCO3: 26    / Base Excess: 3.3   /  SaO2: 99.8                           11.1   14.67 )-----------( 58       ( 26 Oct 2017 06:35 )             35.3                         11.3   16.25 )-----------( 53       ( 26 Oct 2017 05:30 )             36.1                         11.3   21.93 )-----------( 56       ( 25 Oct 2017 16:03 )             36.3                         11.0   21.40 )-----------( 57       ( 25 Oct 2017 05:37 )             35.9                         12.5   25.70 )-----------( 81       ( 24 Oct 2017 05:30 )             40.4                         12.2   16.97 )-----------( 79       ( 23 Oct 2017 22:15 )             39.0     10-26    139  |  99  |  22  ----------------------------<  142<H>  5.1   |  29  |  0.94  10-25    135  |  98  |  28<H>  ----------------------------<  110<H>  5.2   |  27  |  1.24  10-25    139  |  99  |  29<H>  ----------------------------<  80  5.3   |  27  |  1.68<H>    Ca    9.2      26 Oct 2017 06:35  Ca    8.6      25 Oct 2017 17:30  Ca    8.5      25 Oct 2017 05:37  Phos  3.3     10-26  Mg     2.1     10-26      CAPILLARY BLOOD GLUCOSE                Cultures:   < from: Xray Chest 1 View AP -PORTABLE-Routine (10.26.17 @ 08:07) >  TION:  TIME OF EXAM: October 25, 2017 at 5:20 PM.    CLINICAL INFORMATION: Status post VATS.    COMPARISON:  October 25, 2017 at 6:38 AM.    TECHNIQUE:   AP Portable chest x-ray.    INTERPRETATION:     The heart is not enlarged.  Bilateral lung opacities are not significantly changed.  A trace right pleural effusion is unchanged.  Left lung postsurgical change again noted.  Left pleural pigtail catheter is unchanged in position.  No significant change in left hydropneumothorax.            IMPRESSION:  No significant change.    Left pleural pigtail catheter unchanged.    Unchanged left hydropneumothorax and trace right pleural effusion.    Unchanged bilateral lung opacities.    < end of copied text >                            Studies  Chest X-RAY  CT SCAN Chest   Venous Dopplers: LE:   CT Abdomen  Others

## 2017-10-26 NOTE — PROGRESS NOTE ADULT - PROBLEM SELECTOR PLAN 2
pt has been having slowly decreasing platelets: he is on eliquis too: no bleeding. defer to primary team  10/22: please recheck his platelets  10/24: 81: stable: no bleeding  10/25: will be followed in cticu  10/26: cont to get worse: would suggest to get HEMONC

## 2017-10-27 LAB
ALBUMIN SERPL ELPH-MCNC: 3.1 G/DL — LOW (ref 3.3–5)
ALP SERPL-CCNC: 69 U/L — SIGNIFICANT CHANGE UP (ref 40–120)
ALT FLD-CCNC: 24 U/L — SIGNIFICANT CHANGE UP (ref 4–41)
AST SERPL-CCNC: 22 U/L — SIGNIFICANT CHANGE UP (ref 4–40)
BILIRUB SERPL-MCNC: 0.2 MG/DL — SIGNIFICANT CHANGE UP (ref 0.2–1.2)
BUN SERPL-MCNC: 21 MG/DL — SIGNIFICANT CHANGE UP (ref 7–23)
CALCIUM SERPL-MCNC: 9.6 MG/DL — SIGNIFICANT CHANGE UP (ref 8.4–10.5)
CHLORIDE SERPL-SCNC: 100 MMOL/L — SIGNIFICANT CHANGE UP (ref 98–107)
CO2 SERPL-SCNC: 30 MMOL/L — SIGNIFICANT CHANGE UP (ref 22–31)
CREAT SERPL-MCNC: 0.89 MG/DL — SIGNIFICANT CHANGE UP (ref 0.5–1.3)
GLUCOSE SERPL-MCNC: 132 MG/DL — HIGH (ref 70–99)
HCT VFR BLD CALC: 33.4 % — LOW (ref 39–50)
HGB BLD-MCNC: 10.7 G/DL — LOW (ref 13–17)
MAGNESIUM SERPL-MCNC: 2 MG/DL — SIGNIFICANT CHANGE UP (ref 1.6–2.6)
MCHC RBC-ENTMCNC: 31.8 PG — SIGNIFICANT CHANGE UP (ref 27–34)
MCHC RBC-ENTMCNC: 32 % — SIGNIFICANT CHANGE UP (ref 32–36)
MCV RBC AUTO: 99.4 FL — SIGNIFICANT CHANGE UP (ref 80–100)
NRBC # FLD: 0 — SIGNIFICANT CHANGE UP
PHOSPHATE SERPL-MCNC: 3 MG/DL — SIGNIFICANT CHANGE UP (ref 2.5–4.5)
PLATELET # BLD AUTO: 61 K/UL — LOW (ref 150–400)
PMV BLD: 14 FL — HIGH (ref 7–13)
POTASSIUM SERPL-MCNC: 4.9 MMOL/L — SIGNIFICANT CHANGE UP (ref 3.5–5.3)
POTASSIUM SERPL-SCNC: 4.9 MMOL/L — SIGNIFICANT CHANGE UP (ref 3.5–5.3)
PROT SERPL-MCNC: 6.6 G/DL — SIGNIFICANT CHANGE UP (ref 6–8.3)
RBC # BLD: 3.36 M/UL — LOW (ref 4.2–5.8)
RBC # FLD: 15.4 % — HIGH (ref 10.3–14.5)
SODIUM SERPL-SCNC: 142 MMOL/L — SIGNIFICANT CHANGE UP (ref 135–145)
WBC # BLD: 16.11 K/UL — HIGH (ref 3.8–10.5)
WBC # FLD AUTO: 16.11 K/UL — HIGH (ref 3.8–10.5)

## 2017-10-27 PROCEDURE — 71010: CPT | Mod: 26

## 2017-10-27 PROCEDURE — 71010: CPT | Mod: 26,77

## 2017-10-27 RX ORDER — ACETAMINOPHEN 500 MG
1000 TABLET ORAL ONCE
Qty: 0 | Refills: 0 | Status: COMPLETED | OUTPATIENT
Start: 2017-10-27 | End: 2017-10-27

## 2017-10-27 RX ADMIN — Medication 1000 MILLIGRAM(S): at 22:45

## 2017-10-27 RX ADMIN — LEVALBUTEROL 0.63 MILLIGRAM(S): 1.25 SOLUTION, CONCENTRATE RESPIRATORY (INHALATION) at 09:40

## 2017-10-27 RX ADMIN — MIDODRINE HYDROCHLORIDE 5 MILLIGRAM(S): 2.5 TABLET ORAL at 13:09

## 2017-10-27 RX ADMIN — Medication 500 MICROGRAM(S): at 04:39

## 2017-10-27 RX ADMIN — Medication 500 MICROGRAM(S): at 16:05

## 2017-10-27 RX ADMIN — Medication 1 MILLIGRAM(S): at 13:08

## 2017-10-27 RX ADMIN — LEVALBUTEROL 0.63 MILLIGRAM(S): 1.25 SOLUTION, CONCENTRATE RESPIRATORY (INHALATION) at 04:39

## 2017-10-27 RX ADMIN — Medication 400 MILLIGRAM(S): at 22:30

## 2017-10-27 RX ADMIN — Medication 325 MILLIGRAM(S): at 06:19

## 2017-10-27 RX ADMIN — BUDESONIDE AND FORMOTEROL FUMARATE DIHYDRATE 2 PUFF(S): 160; 4.5 AEROSOL RESPIRATORY (INHALATION) at 22:36

## 2017-10-27 RX ADMIN — VORICONAZOLE 200 MILLIGRAM(S): 10 INJECTION, POWDER, LYOPHILIZED, FOR SOLUTION INTRAVENOUS at 06:19

## 2017-10-27 RX ADMIN — Medication 650 MILLIGRAM(S): at 03:18

## 2017-10-27 RX ADMIN — ATORVASTATIN CALCIUM 80 MILLIGRAM(S): 80 TABLET, FILM COATED ORAL at 22:07

## 2017-10-27 RX ADMIN — APIXABAN 5 MILLIGRAM(S): 2.5 TABLET, FILM COATED ORAL at 06:19

## 2017-10-27 RX ADMIN — Medication 650 MILLIGRAM(S): at 03:48

## 2017-10-27 RX ADMIN — Medication 500 MICROGRAM(S): at 09:40

## 2017-10-27 RX ADMIN — APIXABAN 5 MILLIGRAM(S): 2.5 TABLET, FILM COATED ORAL at 17:51

## 2017-10-27 RX ADMIN — Medication 20 MILLIGRAM(S): at 22:06

## 2017-10-27 RX ADMIN — PANTOPRAZOLE SODIUM 40 MILLIGRAM(S): 20 TABLET, DELAYED RELEASE ORAL at 17:51

## 2017-10-27 RX ADMIN — LEVALBUTEROL 0.63 MILLIGRAM(S): 1.25 SOLUTION, CONCENTRATE RESPIRATORY (INHALATION) at 22:29

## 2017-10-27 RX ADMIN — MIDODRINE HYDROCHLORIDE 5 MILLIGRAM(S): 2.5 TABLET ORAL at 06:19

## 2017-10-27 RX ADMIN — Medication 325 MILLIGRAM(S): at 22:07

## 2017-10-27 RX ADMIN — Medication 500 MICROGRAM(S): at 22:30

## 2017-10-27 RX ADMIN — Medication 325 MILLIGRAM(S): at 13:08

## 2017-10-27 RX ADMIN — DIVALPROEX SODIUM 500 MILLIGRAM(S): 500 TABLET, DELAYED RELEASE ORAL at 17:51

## 2017-10-27 RX ADMIN — Medication 20 MILLIGRAM(S): at 13:09

## 2017-10-27 RX ADMIN — VORICONAZOLE 200 MILLIGRAM(S): 10 INJECTION, POWDER, LYOPHILIZED, FOR SOLUTION INTRAVENOUS at 17:51

## 2017-10-27 RX ADMIN — Medication 20 MILLIGRAM(S): at 06:19

## 2017-10-27 RX ADMIN — LEVALBUTEROL 0.63 MILLIGRAM(S): 1.25 SOLUTION, CONCENTRATE RESPIRATORY (INHALATION) at 16:04

## 2017-10-27 RX ADMIN — Medication 650 MILLIGRAM(S): at 18:27

## 2017-10-27 RX ADMIN — BUDESONIDE AND FORMOTEROL FUMARATE DIHYDRATE 2 PUFF(S): 160; 4.5 AEROSOL RESPIRATORY (INHALATION) at 09:40

## 2017-10-27 RX ADMIN — DIVALPROEX SODIUM 500 MILLIGRAM(S): 500 TABLET, DELAYED RELEASE ORAL at 06:19

## 2017-10-27 RX ADMIN — Medication 650 MILLIGRAM(S): at 17:57

## 2017-10-27 RX ADMIN — PANTOPRAZOLE SODIUM 40 MILLIGRAM(S): 20 TABLET, DELAYED RELEASE ORAL at 06:19

## 2017-10-27 NOTE — PROGRESS NOTE ADULT - PROBLEM SELECTOR PLAN 6
10/4 stop prednisone tomorrow  10/5: dc prednisone today : pt was not on any treatment for sarcoidosis before  10/06: OFF PREDNISONE NOW  10/7: start prednisone 20 mg per day  10/09: started back on IV steroids given his increased wheezing!!  10/10: wheezing better: decrease steroids to 20 mg q 8  10//: decrease and change prednisone to 40 mg daily: probably will be discharged on some prednisone  10/13: decrease prednisone to 30 mg tomorrow  10/15: on 30 mg of prednisone  10/16: on tapering steroids  10/17: stable: cont prednisone , will probably be dced on 20 mg of prednisone chan  10/17: stable   10/18: stable  10/19: cont steroids fro now  10/20: on 20 mg of prednisone for now  10/21: stable  10/22: decrease prednisone to 10 mg a day :he has not been wheezing today  10/24: stable  10/27? candidate of lung transplant? need to visit this issue once this acute condition is over!!

## 2017-10-27 NOTE — PROGRESS NOTE ADULT - SUBJECTIVE AND OBJECTIVE BOX
Patient is a 47y old  Male who presents with a chief complaint of "My leg wouldn't stop shaking." (14 Sep 2017 09:59)  doing ok  no sob   no cough     Any change in ROS:     MEDICATIONS  (STANDING):  ALPRAZolam 0.25 milliGRAM(s) Oral every 8 hours  apixaban 5 milliGRAM(s) Oral every 12 hours  atorvastatin 80 milliGRAM(s) Oral at bedtime  buDESOnide 160 MICROgram(s)/formoterol 4.5 MICROgram(s) Inhaler 2 Puff(s) Inhalation every 12 hours  diVALproex  milliGRAM(s) Oral two times a day  docusate sodium 100 milliGRAM(s) Oral two times a day  ferrous    sulfate 325 milliGRAM(s) Oral every 8 hours  folic acid 1 milliGRAM(s) Oral daily  ipratropium    for Nebulization 500 MICROGram(s) Nebulizer every 6 hours  levalbuterol Inhalation 0.63 milliGRAM(s) Inhalation every 6 hours  methylPREDNISolone sodium succinate Injectable 20 milliGRAM(s) IV Push every 8 hours  midodrine 5 milliGRAM(s) Oral every 8 hours  pantoprazole    Tablet 40 milliGRAM(s) Oral two times a day before meals  phenylephrine    Infusion 0.3 MICROgram(s)/kG/Min (10.406 mL/Hr) IV Continuous <Continuous>  polyethylene glycol 3350 17 Gram(s) Oral at bedtime  voriconazole 200 milliGRAM(s) Oral every 12 hours    MEDICATIONS  (PRN):  acetaminophen    Suspension 650 milliGRAM(s) Oral every 6 hours PRN For Temp greater than 38 C (100.4 F)  acetaminophen   Tablet. 650 milliGRAM(s) Oral every 6 hours PRN Mild Pain (1 - 3)  aluminum hydroxide/magnesium hydroxide/simethicone Suspension 30 milliLiter(s) Oral every 6 hours PRN Dyspepsia  HYDROmorphone  Injectable 1 milliGRAM(s) IV Push every 4 hours PRN Severe Pain (7 - 10)  temazepam 15 milliGRAM(s) Oral at bedtime PRN Insomnia    Vital Signs Last 24 Hrs  T(C): 36.9 (27 Oct 2017 12:00), Max: 36.9 (27 Oct 2017 00:00)  T(F): 98.4 (27 Oct 2017 12:00), Max: 98.4 (27 Oct 2017 00:00)  HR: 91 (27 Oct 2017 12:00) (72 - 101)  BP: 117/71 (27 Oct 2017 12:00) (92/64 - 117/71)  BP(mean): 80 (27 Oct 2017 12:00) (68 - 88)  RR: 18 (27 Oct 2017 12:00) (16 - 23)  SpO2: 98% (27 Oct 2017 12:00) (89% - 100%)    I&O's Summary    26 Oct 2017 07:01  -  27 Oct 2017 07:00  --------------------------------------------------------  IN: 904.6 mL / OUT: 790 mL / NET: 114.6 mL    27 Oct 2017 07:01  -  27 Oct 2017 13:24  --------------------------------------------------------  IN: 0 mL / OUT: 0 mL / NET: 0 mL          Physical Exam:   GENERAL: NAD, well-groomed, well-developed  HEENT: GIOVANI/   Atraumatic, Normocephalic  ENMT: No tonsillar erythema, exudates, or enlargement; Moist mucous membranes, Good dentition, No lesions  NECK: Supple, No JVD, Normal thyroid  CHEST/LUNG: poor resp excursion  CVS: Regular rate and rhythm; No murmurs, rubs, or gallops  GI: : Soft, Nontender, Nondistended; Bowel sounds present  NERVOUS SYSTEM:  Alert & Oriented X3  EXTREMITIES:  2+ Peripheral Pulses, No clubbing, cyanosis, or edema  LYMPH: No lymphadenopathy noted  SKIN: No rashes or lesions  ENDOCRINOLOGY: No Thyromegaly  PSYCH: Appropriate    Labs:  ABG - ( 25 Oct 2017 16:00 )  pH: 7.30  /  pCO2: 61    /  pO2: 158   / HCO3: 26    / Base Excess: 3.3   /  SaO2: 99.8                           10.7   16.11 )-----------( 61       ( 27 Oct 2017 06:10 )             33.4                         11.1   14.67 )-----------( 58       ( 26 Oct 2017 06:35 )             35.3                         11.3   16.25 )-----------( 53       ( 26 Oct 2017 05:30 )             36.1                         11.3   21.93 )-----------( 56       ( 25 Oct 2017 16:03 )             36.3                         11.0   21.40 )-----------( 57       ( 25 Oct 2017 05:37 )             35.9                         12.5   25.70 )-----------( 81       ( 24 Oct 2017 05:30 )             40.4                         12.2   16.97 )-----------( 79       ( 23 Oct 2017 22:15 )             39.0     10-27    142  |  100  |  21  ----------------------------<  132<H>  4.9   |  30  |  0.89  10-26    139  |  99  |  22  ----------------------------<  142<H>  5.1   |  29  |  0.94  10-25    135  |  98  |  28<H>  ----------------------------<  110<H>  5.2   |  27  |  1.24  10-25    139  |  99  |  29<H>  ----------------------------<  80  5.3   |  27  |  1.68<H>    Ca    9.6      27 Oct 2017 06:10  Ca    9.2      26 Oct 2017 06:35  Ca    8.6      25 Oct 2017 17:30  Phos  3.0     10-27  Phos  3.3     10-26  Mg     2.0     10-27  Mg     2.1     10-26    TPro  6.6  /  Alb  3.1<L>  /  TBili  0.2  /  DBili  x   /  AST  22  /  ALT  24  /  AlkPhos  69  10-27    CAPILLARY BLOOD GLUCOSE          LIVER FUNCTIONS - ( 27 Oct 2017 06:10 )  Alb: 3.1 g/dL / Pro: 6.6 g/dL / ALK PHOS: 69 u/L / ALT: 24 u/L / AST: 22 u/L / GGT: x               Cultures:       < from: Xray Chest 1 View AP -PORTABLE-Routine (10.26.17 @ 08:07) >  A trace right pleural effusion is unchanged.  Left lung postsurgical change again noted.  Left pleural pigtail catheter is unchanged in position.  No significant change in left hydropneumothorax.            IMPRESSION:  No significant change.    Left pleural pigtail catheter unchanged.    Unchanged left hydropneumothorax and trace right pleural effusion.    Unchanged bilateral lung opacities.    < end of copied text >                        Studies  Chest X-RAY  CT SCAN Chest   Venous Dopplers: LE:   CT Abdomen  Others

## 2017-10-27 NOTE — PROGRESS NOTE ADULT - PROBLEM SELECTOR PLAN 4
10/8: cont prednisone with BD  10/09: his wheezing has increased today: Would change to IV METHYLPREDNISOLONE: 20 MG Q 8 HOURS  1/10was on 40 mg three times a day: dw icu attending , to decrease it to 20 q 8 hours  11/11: would change to po prednisone 40 mg daily  10/12: on steroids for now with slow taper  10/13: decrease prednisone to 30 mg tomorrow  10/14: prednisone 30 md every day : and then cut down to 20 mg  10/15: cont prednisone  10/16: on tapering steroids  10/17: no wheezing pt/ot  10/19: mild wheezing today : start duoneb ATC  10/20: give duoneb q 6 hours ATC  10/21: still with off and on wheezing: cont 20 mg of steroids  10/22: decrease steroids to 10 mg a day  10/25: started on high dose steroids by cticu  10/26: pt was wheezing yesterday as well as was acutely hypercarbic: the steroids were increased and switched to IV: Decrease IV steroids to q 8 hours  10/17: it is very difficult to taper steroids on  him as when they are decreased, he starts wheezing again

## 2017-10-27 NOTE — PROGRESS NOTE ADULT - SUBJECTIVE AND OBJECTIVE BOX
OSCAR CHACKO            MRN-[id number]         No Known Allergies             47 M PMH of sarcoidosis, HTN, COPD, and asthma who was initially admitted on 09/10 w cc of severe leg spasms X two days  In E.D. pt found to be wheezing, so he was seen by Pulmonary and started on IV Steroids for sarcoidosis  On 9/12 at 4 am, a RRT and Stroke codes were called as the patient became unresponsive.  He was transferred to MICU and then intubated for airway protection.   The patient was found to have a new infarct on MRI and also found to have a patent PFO on this admission as well. He was weaned off vent on 09/13 and transferred to the floors   There was concern patient was having paroxysmal afib, so he was started on Eliquis yesterday evening. He has received 2 doses of Eliquis on 09/19.   However, on 09/20, the patient had a coughing episode, which was not unusual for him, but during this episode he began to cough up fresh blood, about 1/2 cup of blood. He underwent Bronchoscopy with bronchoalveolar lavage of both sides and epi injection on 09/21 and was to have received further IR embolization for hemoptysis control in the next few days.  However, he developed hemoptysis again and underwent urgent LULectomy, complicated with post op bleeding.    Left VATS and DAVID lobectomy 9/24/17 [POD # 18]  Re-op L thoracotomy, evacuation of hemothorax (600cc clot, 400cc blood) no obvious site of bleeding  9/25/17 [POD # 17]  Re-op L thoracotomy, evacuation of hemothorax; RTOR x2 100cc clot; 200cc blood: bleeding posterior intercostal arteries identified and cauterized 9/25/17 [POD # 17]  Pt was transferred to ICU because of increased SOB on the floor. Pt also had increased left apical pneumo for which I.R put a   Pigtail catheter. [POD # 6 (405025)] (Transferred back to ICU 2/2 increased SOB on telemetry and increased left apical ptx)    10/25/17  Pt was brought to ICU for acute SOB / Hypotension / Tachycardia / acute renal failure.   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  COPD (chronic obstructive pulmonary disease)  Asthma  Hypertension  Sarcoidosis  No significant past surgical history        ICU Vital Signs Last 24 Hrs  T(C): 36.9 (27 Oct 2017 00:00), Max: 36.9 (27 Oct 2017 00:00)  T(F): 98.4 (27 Oct 2017 00:00), Max: 98.4 (27 Oct 2017 00:00)  HR: 76 (27 Oct 2017 05:00) (73 - 97)  BP: 112/81 (27 Oct 2017 05:00) (90/60 - 122/82)  BP(mean): 88 (27 Oct 2017 05:00) (65 - 92)  ABP: --  ABP(mean): --  RR: 19 (27 Oct 2017 05:00) (16 - 23)  SpO2: 98% (27 Oct 2017 05:00) (92% - 100%)    I&O's Summary    25 Oct 2017 07:01  -  26 Oct 2017 07:00  --------------------------------------------------------  IN: 1335.5 mL / OUT: 1910 mL / NET: -574.5 mL    26 Oct 2017 07:01  -  27 Oct 2017 05:53  --------------------------------------------------------  IN: 854.6 mL / OUT: 505 mL / NET: 349.6 mL      CAPILLARY BLOOD GLUCOSE          MEDICATIONS  (STANDING):  ALPRAZolam 0.25 milliGRAM(s) Oral every 8 hours  apixaban 5 milliGRAM(s) Oral every 12 hours  atorvastatin 80 milliGRAM(s) Oral at bedtime  buDESOnide 160 MICROgram(s)/formoterol 4.5 MICROgram(s) Inhaler 2 Puff(s) Inhalation every 12 hours  diVALproex  milliGRAM(s) Oral two times a day  docusate sodium 100 milliGRAM(s) Oral two times a day  ferrous    sulfate 325 milliGRAM(s) Oral every 8 hours  folic acid 1 milliGRAM(s) Oral daily  ipratropium    for Nebulization 500 MICROGram(s) Nebulizer every 6 hours  levalbuterol Inhalation 0.63 milliGRAM(s) Inhalation every 6 hours  methylPREDNISolone sodium succinate Injectable 20 milliGRAM(s) IV Push every 8 hours  midodrine 5 milliGRAM(s) Oral every 8 hours  pantoprazole    Tablet 40 milliGRAM(s) Oral two times a day before meals  phenylephrine    Infusion 0.3 MICROgram(s)/kG/Min (10.406 mL/Hr) IV Continuous <Continuous>  polyethylene glycol 3350 17 Gram(s) Oral at bedtime  voriconazole 200 milliGRAM(s) Oral every 12 hours    MEDICATIONS  (PRN):  acetaminophen    Suspension 650 milliGRAM(s) Oral every 6 hours PRN For Temp greater than 38 C (100.4 F)  acetaminophen   Tablet. 650 milliGRAM(s) Oral every 6 hours PRN Mild Pain (1 - 3)  aluminum hydroxide/magnesium hydroxide/simethicone Suspension 30 milliLiter(s) Oral every 6 hours PRN Dyspepsia  HYDROmorphone  Injectable 1 milliGRAM(s) IV Push every 4 hours PRN Severe Pain (7 - 10)  temazepam 15 milliGRAM(s) Oral at bedtime PRN Insomnia      Physical exam:   General:               Awake, alert,                                                Neuro:                 focal,  weak   L>R  KAMLESH>LL                          Cardiovascular:    S1 & S2, regular                           Respiratory:         Bilateral conducted sounds                          GI:                       Soft, nondistended and nontender, Bowel sounds active                            Ext:                      No cyanosis &  mild  bilateral  edema of upper ext.                            Labs:                                                                           11.1   14.67 )-----------( 58       ( 26 Oct 2017 06:35 )             35.3             10-26    139  |  99  |  22  ----------------------------<  142<H>  5.1   |  29  |  0.94    Ca    9.2      26 Oct 2017 06:35  Phos  3.3     10-26  Mg     2.1     10-26                   CXR:    Plan:    47 M PMH of sarcoidosis, HTN, COPD, and asthma s/p Left VATS and DAVID lobectomy, postop bleeding, re-op L thoracotomy, evacuation of hemothorax, talk pleurodesis for air leak / pneumo. Pt became hypotensive , tachycardia and progressively short of breath with diffuse wheezing. Transferred to ICU for hemodynamic monitoring, preventing shock and respiratory failure. Over the last 48 hours respiratory status improved, not tachycardic and able to ambulate.    Neuro:                                         Pain control with Tylenol                            Cardiovascular:                                             Continue hemodynamic monitoring.    Hypotension: On Midodrine.     PFO - On Eliquis 5mg/bid                            Respiratory:                                                                                                                                                                                                                                                                                                                                                                                                                                                                                                                                                                                                                                                                                                                                                                                                                                                                                                      Comfortable in chair     On Solumedrol 20mg Q8hrs - taper as tolerated                                         Monitor chest tube output.                                         Pneumothorax with L pigtail -  on suction - 20 cmH2O  -  Try water seal today and check CXR                                                           Sarcoidosis: On Steroids                             GI                                         On PO diet                                         Continue GI prophylaxis with Protonix                                         Continue Zofran / Reglan for nausea - PRN	                                                                 Renal:                                         JERRI: Normalized renal function                                         Monitor I/Os and BMP                                                  Hem/ Onc:                                                                                  Thrombocytopenia - Monitor Platelets / Hb/Hct,      Anemia: Continue Iron / FA                            Infectious disease:    Aspergilloma - On voriconazole 200 milliGRAM(s)     Monitor for fever / leukocytosis.                                         All surgical incision / chest tube  sites look clean                            Endocrine                                          Continue Accu-Checks with coverage    All available pertinent clinical, laboratory, radiographic, hemodynamic, echocardiographic, respiratory data, microbiologic data and chart were reviewed and analyzed   Patient seen, examined and plan discussed with CT Surgeon / CTICU team.        Continue physical therapy              Len Kim MD OSCAR CHACKO            [id number]         No Known Allergies             47 M PMH of sarcoidosis, HTN, COPD, and asthma who was initially admitted on 09/10 w cc of severe leg spasms X two days  In E.D. pt found to be wheezing, so he was seen by Pulmonary and started on IV Steroids for sarcoidosis  On 9/12 at 4 am, a RRT and Stroke codes were called as the patient became unresponsive.  He was transferred to MICU and then intubated for airway protection.   The patient was found to have a new infarct on MRI and also found to have a patent PFO on this admission as well. He was weaned off vent on 09/13 and transferred to the floors   There was concern patient was having paroxysmal afib, so he was started on Eliquis yesterday evening. He has received 2 doses of Eliquis on 09/19.   However, on 09/20, the patient had a coughing episode, which was not unusual for him, but during this episode he began to cough up fresh blood, about 1/2 cup of blood. He underwent Bronchoscopy with bronchoalveolar lavage of both sides and epi injection on 09/21 and was to have received further IR embolization for hemoptysis control in the next few days.  However, he developed hemoptysis again and underwent urgent LULectomy, complicated with post op bleeding.    Left VATS and DAVID lobectomy 9/24/17 [POD # 18]  Re-op L thoracotomy, evacuation of hemothorax (600cc clot, 400cc blood) no obvious site of bleeding  9/25/17 [POD # 17]  Re-op L thoracotomy, evacuation of hemothorax; RTOR x2 100cc clot; 200cc blood: bleeding posterior intercostal arteries identified and cauterized 9/25/17 [POD # 17]  Pt was transferred to ICU because of increased SOB on the floor. Pt also had increased left apical pneumo for which I.R put a   Pigtail catheter. [POD # 6 (555719)] (Transferred back to ICU 2/2 increased SOB on telemetry and increased left apical ptx)    10/25/17  Pt was brought to ICU for acute SOB / Hypotension / Tachycardia / acute renal failure.   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  COPD (chronic obstructive pulmonary disease)  Asthma  Hypertension  Sarcoidosis  No significant past surgical history        ICU Vital Signs Last 24 Hrs  T(C): 36.9 (27 Oct 2017 00:00), Max: 36.9 (27 Oct 2017 00:00)  T(F): 98.4 (27 Oct 2017 00:00), Max: 98.4 (27 Oct 2017 00:00)  HR: 76 (27 Oct 2017 05:00) (73 - 97)  BP: 112/81 (27 Oct 2017 05:00) (90/60 - 122/82)  BP(mean): 88 (27 Oct 2017 05:00) (65 - 92)  ABP: --  ABP(mean): --  RR: 19 (27 Oct 2017 05:00) (16 - 23)  SpO2: 98% (27 Oct 2017 05:00) (92% - 100%)    I&O's Summary    25 Oct 2017 07:01  -  26 Oct 2017 07:00  --------------------------------------------------------  IN: 1335.5 mL / OUT: 1910 mL / NET: -574.5 mL    26 Oct 2017 07:01  -  27 Oct 2017 05:53  --------------------------------------------------------  IN: 854.6 mL / OUT: 505 mL / NET: 349.6 mL      CAPILLARY BLOOD GLUCOSE          MEDICATIONS  (STANDING):  ALPRAZolam 0.25 milliGRAM(s) Oral every 8 hours  apixaban 5 milliGRAM(s) Oral every 12 hours  atorvastatin 80 milliGRAM(s) Oral at bedtime  buDESOnide 160 MICROgram(s)/formoterol 4.5 MICROgram(s) Inhaler 2 Puff(s) Inhalation every 12 hours  diVALproex  milliGRAM(s) Oral two times a day  docusate sodium 100 milliGRAM(s) Oral two times a day  ferrous    sulfate 325 milliGRAM(s) Oral every 8 hours  folic acid 1 milliGRAM(s) Oral daily  ipratropium    for Nebulization 500 MICROGram(s) Nebulizer every 6 hours  levalbuterol Inhalation 0.63 milliGRAM(s) Inhalation every 6 hours  methylPREDNISolone sodium succinate Injectable 20 milliGRAM(s) IV Push every 8 hours  midodrine 5 milliGRAM(s) Oral every 8 hours  pantoprazole    Tablet 40 milliGRAM(s) Oral two times a day before meals  phenylephrine    Infusion 0.3 MICROgram(s)/kG/Min (10.406 mL/Hr) IV Continuous <Continuous>  polyethylene glycol 3350 17 Gram(s) Oral at bedtime  voriconazole 200 milliGRAM(s) Oral every 12 hours    MEDICATIONS  (PRN):  acetaminophen    Suspension 650 milliGRAM(s) Oral every 6 hours PRN For Temp greater than 38 C (100.4 F)  acetaminophen   Tablet. 650 milliGRAM(s) Oral every 6 hours PRN Mild Pain (1 - 3)  aluminum hydroxide/magnesium hydroxide/simethicone Suspension 30 milliLiter(s) Oral every 6 hours PRN Dyspepsia  HYDROmorphone  Injectable 1 milliGRAM(s) IV Push every 4 hours PRN Severe Pain (7 - 10)  temazepam 15 milliGRAM(s) Oral at bedtime PRN Insomnia      Physical exam:   General:               Awake, alert,                                                Neuro:                 focal,  weak   L>R  KAMLESH>LL                          Cardiovascular:    S1 & S2, regular                           Respiratory:         Bilateral conducted sounds                          GI:                       Soft, nondistended and nontender, Bowel sounds active                            Ext:                      No cyanosis &  mild  bilateral  edema of upper ext.                            Labs:                                                                           11.1   14.67 )-----------( 58       ( 26 Oct 2017 06:35 )             35.3             10-26    139  |  99  |  22  ----------------------------<  142<H>  5.1   |  29  |  0.94    Ca    9.2      26 Oct 2017 06:35  Phos  3.3     10-26  Mg     2.1     10-26                   CXR:    Plan:    47 M PMH of sarcoidosis, HTN, COPD, and asthma s/p Left VATS and DAVID lobectomy, postop bleeding, re-op L thoracotomy, evacuation of hemothorax, talk pleurodesis for air leak / pneumo. Pt became hypotensive , tachycardia and progressively short of breath with diffuse wheezing. Transferred to ICU for hemodynamic monitoring, preventing shock and respiratory failure. Over the last 48 hours respiratory status improved, not tachycardic and able to ambulate.    Neuro:                                         Pain control with Tylenol                            Cardiovascular:                                             Continue hemodynamic monitoring.    Hypotension: On Midodrine.     PFO - On Eliquis 5mg/bid                            Respiratory:                                                                                                                                                                                                                                                                                                                                                                                                                                                                                                                                                                                                                                                                                                                                                                                                                                                                                                      Comfortable in chair     On Solumedrol 20mg Q8hrs - taper as tolerated                                         Monitor chest tube output.                                         L-Pneumothorax with L pigtail -  on suction - 20 cmH2O                                                           Sarcoidosis: On Steroids                             GI                                         On PO diet                                         Continue GI prophylaxis with Protonix                                         Continue Zofran / Reglan for nausea - PRN	                                                                 Renal:                                         JERRI: Normalized renal function                                         Monitor I/Os and BMP                                                  Hem/ Onc:                                                                                  Thrombocytopenia - Monitor Platelets / Hb/Hct, High risk for bleeding, monitor for signs of bleeding     Anemia: Continue Iron / FA                            Infectious disease:    Aspergilloma - On voriconazole 200 milliGRAM(s)     Monitor for fever / leukocytosis.                                         All surgical incision / chest tube  sites look clean                            Endocrine                                          Continue Accu-Checks with coverage    All available pertinent clinical, laboratory, radiographic, hemodynamic, echocardiographic, respiratory data, microbiologic data and chart were reviewed and analyzed   Patient seen, examined and plan discussed with CT Surgeon / CTICU team.        Continue physical therapy              Len Kim MD

## 2017-10-27 NOTE — PROGRESS NOTE ADULT - PROBLEM SELECTOR PLAN 1
s/p blood patch, but still with air leak: defer to thoracici surgery  10/22: increased on yesterdays chest -ray: failed blood patch.....next step? pleurodesis  10/24: s/p pleurodesis: air leaking:  10/25: in the afternoon he became acutely hypercarbic: transfered down to cticu: will need bipap help: plus also he is febrile? talc reaction: have a low threshold to starting antibiotcs  10/26: s/p pleurodesis: developed inflammatory response: currently on low dose vasopressors : defer to thoracic surgery  10/27: persistent hydropneumothorax left side: s/p pleurodesis

## 2017-10-27 NOTE — PROGRESS NOTE ADULT - PROBLEM SELECTOR PLAN 2
pt has been having slowly decreasing platelets: he is on eliquis too: no bleeding. defer to primary team  10/22: please recheck his platelets  10/24: 81: stable: no bleeding  10/25: will be followed in cticu  10/26: cont to get worse: would suggest to get HEMONC  10/27: better slightly today: defer to cticu

## 2017-10-28 LAB
ALBUMIN SERPL ELPH-MCNC: 2.9 G/DL — LOW (ref 3.3–5)
ALP SERPL-CCNC: 64 U/L — SIGNIFICANT CHANGE UP (ref 40–120)
ALT FLD-CCNC: 22 U/L — SIGNIFICANT CHANGE UP (ref 4–41)
APTT BLD: 27.7 SEC — SIGNIFICANT CHANGE UP (ref 27.5–37.4)
AST SERPL-CCNC: 21 U/L — SIGNIFICANT CHANGE UP (ref 4–40)
BILIRUB SERPL-MCNC: 0.2 MG/DL — SIGNIFICANT CHANGE UP (ref 0.2–1.2)
BUN SERPL-MCNC: 23 MG/DL — SIGNIFICANT CHANGE UP (ref 7–23)
CALCIUM SERPL-MCNC: 9.1 MG/DL — SIGNIFICANT CHANGE UP (ref 8.4–10.5)
CHLORIDE SERPL-SCNC: 101 MMOL/L — SIGNIFICANT CHANGE UP (ref 98–107)
CO2 SERPL-SCNC: 36 MMOL/L — HIGH (ref 22–31)
CREAT SERPL-MCNC: 0.91 MG/DL — SIGNIFICANT CHANGE UP (ref 0.5–1.3)
GLUCOSE SERPL-MCNC: 128 MG/DL — HIGH (ref 70–99)
HCT VFR BLD CALC: 30.1 % — LOW (ref 39–50)
HGB BLD-MCNC: 9.5 G/DL — LOW (ref 13–17)
INR BLD: 1.32 — HIGH (ref 0.88–1.17)
MAGNESIUM SERPL-MCNC: 2 MG/DL — SIGNIFICANT CHANGE UP (ref 1.6–2.6)
MCHC RBC-ENTMCNC: 31.3 PG — SIGNIFICANT CHANGE UP (ref 27–34)
MCHC RBC-ENTMCNC: 31.6 % — LOW (ref 32–36)
MCV RBC AUTO: 99 FL — SIGNIFICANT CHANGE UP (ref 80–100)
NRBC # FLD: 0 — SIGNIFICANT CHANGE UP
PHOSPHATE SERPL-MCNC: 3.1 MG/DL — SIGNIFICANT CHANGE UP (ref 2.5–4.5)
PLATELET # BLD AUTO: 60 K/UL — LOW (ref 150–400)
PMV BLD: 13.6 FL — HIGH (ref 7–13)
POTASSIUM SERPL-MCNC: 4.7 MMOL/L — SIGNIFICANT CHANGE UP (ref 3.5–5.3)
POTASSIUM SERPL-SCNC: 4.7 MMOL/L — SIGNIFICANT CHANGE UP (ref 3.5–5.3)
PROT SERPL-MCNC: 6.1 G/DL — SIGNIFICANT CHANGE UP (ref 6–8.3)
PROTHROM AB SERPL-ACNC: 14.9 SEC — HIGH (ref 9.8–13.1)
RBC # BLD: 3.04 M/UL — LOW (ref 4.2–5.8)
RBC # FLD: 15.8 % — HIGH (ref 10.3–14.5)
SODIUM SERPL-SCNC: 143 MMOL/L — SIGNIFICANT CHANGE UP (ref 135–145)
WBC # BLD: 11.68 K/UL — HIGH (ref 3.8–10.5)
WBC # FLD AUTO: 11.68 K/UL — HIGH (ref 3.8–10.5)

## 2017-10-28 PROCEDURE — 99233 SBSQ HOSP IP/OBS HIGH 50: CPT

## 2017-10-28 PROCEDURE — 71010: CPT | Mod: 26

## 2017-10-28 RX ADMIN — Medication 500 MICROGRAM(S): at 15:58

## 2017-10-28 RX ADMIN — LEVALBUTEROL 0.63 MILLIGRAM(S): 1.25 SOLUTION, CONCENTRATE RESPIRATORY (INHALATION) at 15:58

## 2017-10-28 RX ADMIN — Medication 325 MILLIGRAM(S): at 13:46

## 2017-10-28 RX ADMIN — Medication 500 MICROGRAM(S): at 04:38

## 2017-10-28 RX ADMIN — APIXABAN 5 MILLIGRAM(S): 2.5 TABLET, FILM COATED ORAL at 06:10

## 2017-10-28 RX ADMIN — APIXABAN 5 MILLIGRAM(S): 2.5 TABLET, FILM COATED ORAL at 18:36

## 2017-10-28 RX ADMIN — PANTOPRAZOLE SODIUM 40 MILLIGRAM(S): 20 TABLET, DELAYED RELEASE ORAL at 06:09

## 2017-10-28 RX ADMIN — Medication 500 MICROGRAM(S): at 09:40

## 2017-10-28 RX ADMIN — BUDESONIDE AND FORMOTEROL FUMARATE DIHYDRATE 2 PUFF(S): 160; 4.5 AEROSOL RESPIRATORY (INHALATION) at 09:40

## 2017-10-28 RX ADMIN — LEVALBUTEROL 0.63 MILLIGRAM(S): 1.25 SOLUTION, CONCENTRATE RESPIRATORY (INHALATION) at 22:30

## 2017-10-28 RX ADMIN — DIVALPROEX SODIUM 500 MILLIGRAM(S): 500 TABLET, DELAYED RELEASE ORAL at 18:36

## 2017-10-28 RX ADMIN — DIVALPROEX SODIUM 500 MILLIGRAM(S): 500 TABLET, DELAYED RELEASE ORAL at 06:10

## 2017-10-28 RX ADMIN — Medication 500 MICROGRAM(S): at 22:30

## 2017-10-28 RX ADMIN — MIDODRINE HYDROCHLORIDE 5 MILLIGRAM(S): 2.5 TABLET ORAL at 13:45

## 2017-10-28 RX ADMIN — Medication 325 MILLIGRAM(S): at 21:39

## 2017-10-28 RX ADMIN — PANTOPRAZOLE SODIUM 40 MILLIGRAM(S): 20 TABLET, DELAYED RELEASE ORAL at 16:30

## 2017-10-28 RX ADMIN — VORICONAZOLE 200 MILLIGRAM(S): 10 INJECTION, POWDER, LYOPHILIZED, FOR SOLUTION INTRAVENOUS at 06:10

## 2017-10-28 RX ADMIN — LEVALBUTEROL 0.63 MILLIGRAM(S): 1.25 SOLUTION, CONCENTRATE RESPIRATORY (INHALATION) at 04:38

## 2017-10-28 RX ADMIN — Medication 1 MILLIGRAM(S): at 12:46

## 2017-10-28 RX ADMIN — BUDESONIDE AND FORMOTEROL FUMARATE DIHYDRATE 2 PUFF(S): 160; 4.5 AEROSOL RESPIRATORY (INHALATION) at 22:12

## 2017-10-28 RX ADMIN — VORICONAZOLE 200 MILLIGRAM(S): 10 INJECTION, POWDER, LYOPHILIZED, FOR SOLUTION INTRAVENOUS at 18:36

## 2017-10-28 RX ADMIN — ATORVASTATIN CALCIUM 80 MILLIGRAM(S): 80 TABLET, FILM COATED ORAL at 21:38

## 2017-10-28 RX ADMIN — LEVALBUTEROL 0.63 MILLIGRAM(S): 1.25 SOLUTION, CONCENTRATE RESPIRATORY (INHALATION) at 09:40

## 2017-10-28 RX ADMIN — Medication 20 MILLIGRAM(S): at 21:38

## 2017-10-28 RX ADMIN — Medication 20 MILLIGRAM(S): at 06:10

## 2017-10-28 RX ADMIN — Medication 325 MILLIGRAM(S): at 06:09

## 2017-10-28 NOTE — PROGRESS NOTE ADULT - PROBLEM SELECTOR PLAN 3
10/06: stable?: no HEMOPTYSIS  10/7: resolved  10/8: cont prednisone: pneumothorax has resolved  10/09: resolved  10/10: no more hemoptysis  1/11: resolved after surgery and despite eliquis: stable  11/12: stable: s./p surgery: has mod pneumothorax: on the elft side: would defer to ct surgery: back on suction!  10/13: doing   ok  10/14: decrease dose to 30 mg for a few days and then reduce it to 20 mg: increase ptx today on chest radiograph: dw Dr Caldera  10/15: resolved: ptx on the left side is better today on the chest radiogrpah  10/16: small tiny left ptx: defer to thoracici  10/17: resolved  10/20: no more hemoptysis  10/18: doing ok  10/19: no more hemoptysis  10/21: no hemoptysis,  10/22: resolved  10/24: resolved  10/28: resolved

## 2017-10-28 NOTE — PROGRESS NOTE ADULT - PROBLEM SELECTOR PLAN 4
10/8: cont prednisone with BD  10/09: his wheezing has increased today: Would change to IV METHYLPREDNISOLONE: 20 MG Q 8 HOURS  1/10was on 40 mg three times a day: dw icu attending , to decrease it to 20 q 8 hours  11/11: would change to po prednisone 40 mg daily  10/12: on steroids for now with slow taper  10/13: decrease prednisone to 30 mg tomorrow  10/14: prednisone 30 md every day : and then cut down to 20 mg  10/15: cont prednisone  10/16: on tapering steroids  10/17: no wheezing pt/ot  10/19: mild wheezing today : start duoneb ATC  10/20: give duoneb q 6 hours ATC  10/21: still with off and on wheezing: cont 20 mg of steroids  10/22: decrease steroids to 10 mg a day  10/25: started on high dose steroids by cticu  10/26: pt was wheezing yesterday as well as was acutely hypercarbic: the steroids were increased and switched to IV: Decrease IV steroids to q 8 hours  10/17: it is very difficult to taper steroids on  him as when they are decreased, he starts wheezing again  10/28: decrease solumedrol to 20 mg twice a day !!

## 2017-10-28 NOTE — PROGRESS NOTE ADULT - PROBLEM SELECTOR PLAN 1
s/p blood patch, but still with air leak: defer to thoracici surgery  10/22: increased on yesterdays chest -ray: failed blood patch.....next step? pleurodesis  10/24: s/p pleurodesis: air leaking:  10/25: in the afternoon he became acutely hypercarbic: transfered down to cticu: will need bipap help: plus also he is febrile? talc reaction: have a low threshold to starting antibiotcs  10/26: s/p pleurodesis: developed inflammatory response: currently on low dose vasopressors : defer to thoracic surgery  10/27: persistent hydropneumothorax left side: s/p pleurodesis  10/28: doing better: no air leak

## 2017-10-28 NOTE — PROGRESS NOTE ADULT - PROBLEM SELECTOR PLAN 2
pt has been having slowly decreasing platelets: he is on eliquis too: no bleeding. defer to primary team  10/22: please recheck his platelets  10/24: 81: stable: no bleeding  10/25: will be followed in cticu  10/26: cont to get worse: would suggest to get HEMONC  10/27: better slightly today: defer to cticu  10/28: 60: monitor for bleeding

## 2017-10-28 NOTE — PROGRESS NOTE ADULT - PROBLEM SELECTOR PLAN 6
10/4 stop prednisone tomorrow  10/5: dc prednisone today : pt was not on any treatment for sarcoidosis before  10/06: OFF PREDNISONE NOW  10/7: start prednisone 20 mg per day  10/09: started back on IV steroids given his increased wheezing!!  10/10: wheezing better: decrease steroids to 20 mg q 8  10//: decrease and change prednisone to 40 mg daily: probably will be discharged on some prednisone  10/13: decrease prednisone to 30 mg tomorrow  10/15: on 30 mg of prednisone  10/16: on tapering steroids  10/17: stable: cont prednisone , will probably be dced on 20 mg of prednisone chan  10/17: stable   10/18: stable  10/19: cont steroids fro now  10/20: on 20 mg of prednisone for now  10/21: stable  10/22: decrease prednisone to 10 mg a day :he has not been wheezing today  10/24: stable  10/27? candidate of lung transplant? need to visit this issue once this acute condition is over!!  10/28: probably will end up on chronic steroids upon discharge!!

## 2017-10-28 NOTE — PROGRESS NOTE ADULT - SUBJECTIVE AND OBJECTIVE BOX
Patient is a 47y old  Male who presents with a chief complaint of "My leg wouldn't stop shaking." (14 Sep 2017 09:59)    pt is doing better  no air leak     Any change in ROS:     MEDICATIONS  (STANDING):  ALPRAZolam 0.25 milliGRAM(s) Oral every 8 hours  apixaban 5 milliGRAM(s) Oral every 12 hours  atorvastatin 80 milliGRAM(s) Oral at bedtime  buDESOnide 160 MICROgram(s)/formoterol 4.5 MICROgram(s) Inhaler 2 Puff(s) Inhalation every 12 hours  diVALproex  milliGRAM(s) Oral two times a day  docusate sodium 100 milliGRAM(s) Oral two times a day  ferrous    sulfate 325 milliGRAM(s) Oral every 8 hours  folic acid 1 milliGRAM(s) Oral daily  ipratropium    for Nebulization 500 MICROGram(s) Nebulizer every 6 hours  levalbuterol Inhalation 0.63 milliGRAM(s) Inhalation every 6 hours  methylPREDNISolone sodium succinate Injectable 20 milliGRAM(s) IV Push every 12 hours  midodrine 5 milliGRAM(s) Oral every 8 hours  pantoprazole    Tablet 40 milliGRAM(s) Oral two times a day before meals  polyethylene glycol 3350 17 Gram(s) Oral at bedtime  voriconazole 200 milliGRAM(s) Oral every 12 hours    MEDICATIONS  (PRN):  acetaminophen    Suspension 650 milliGRAM(s) Oral every 6 hours PRN For Temp greater than 38 C (100.4 F)  acetaminophen   Tablet. 650 milliGRAM(s) Oral every 6 hours PRN Mild Pain (1 - 3)  aluminum hydroxide/magnesium hydroxide/simethicone Suspension 30 milliLiter(s) Oral every 6 hours PRN Dyspepsia  HYDROmorphone  Injectable 1 milliGRAM(s) IV Push every 4 hours PRN Severe Pain (7 - 10)  temazepam 15 milliGRAM(s) Oral at bedtime PRN Insomnia    Vital Signs Last 24 Hrs  T(C): 36.2 (28 Oct 2017 04:00), Max: 36.8 (27 Oct 2017 18:00)  T(F): 97.1 (28 Oct 2017 04:00), Max: 98.3 (27 Oct 2017 18:00)  HR: 82 (28 Oct 2017 11:00) (69 - 104)  BP: 114/76 (28 Oct 2017 11:00) (99/66 - 130/81)  BP(mean): 83 (28 Oct 2017 11:00) (67 - 92)  RR: 20 (28 Oct 2017 11:00) (17 - 26)  SpO2: 99% (28 Oct 2017 11:00) (92% - 99%)    I&O's Summary    27 Oct 2017 07:01  -  28 Oct 2017 07:00  --------------------------------------------------------  IN: 100 mL / OUT: 570 mL / NET: -470 mL    28 Oct 2017 07:01  -  28 Oct 2017 13:57  --------------------------------------------------------  IN: 240 mL / OUT: 200 mL / NET: 40 mL          Physical Exam:   GENERAL: NAD, well-groomed, well-developed  HEENT: GIOVANI/   Atraumatic, Normocephalic  ENMT: No tonsillar erythema, exudates, or enlargement; Moist mucous membranes, Good dentition, No lesions  NECK: Supple, No JVD, Normal thyroid  CHEST/LUNG: poor resp excursion  CVS: Regular rate and rhythm; No murmurs, rubs, or gallops  GI: : Soft, Nontender, Nondistended; Bowel sounds present  NERVOUS SYSTEM:  Alert & Oriented X3  EXTREMITIES:  2+ Peripheral Pulses, No clubbing, cyanosis, or edema  LYMPH: No lymphadenopathy noted  SKIN: No rashes or lesions  ENDOCRINOLOGY: No Thyromegaly  PSYCH: Appropriate    Labs:                              9.5    11.68 )-----------( 60       ( 28 Oct 2017 02:45 )             30.1                         10.7   16.11 )-----------( 61       ( 27 Oct 2017 06:10 )             33.4                         11.1   14.67 )-----------( 58       ( 26 Oct 2017 06:35 )             35.3                         11.3   16.25 )-----------( 53       ( 26 Oct 2017 05:30 )             36.1                         11.3   21.93 )-----------( 56       ( 25 Oct 2017 16:03 )             36.3                         11.0   21.40 )-----------( 57       ( 25 Oct 2017 05:37 )             35.9     10-28    143  |  101  |  23  ----------------------------<  128<H>  4.7   |  36<H>  |  0.91  10-27    142  |  100  |  21  ----------------------------<  132<H>  4.9   |  30  |  0.89  10-26    139  |  99  |  22  ----------------------------<  142<H>  5.1   |  29  |  0.94  10-25    135  |  98  |  28<H>  ----------------------------<  110<H>  5.2   |  27  |  1.24  10-25    139  |  99  |  29<H>  ----------------------------<  80  5.3   |  27  |  1.68<H>    Ca    9.1      28 Oct 2017 02:38  Ca    9.6      27 Oct 2017 06:10  Phos  3.1     10-28  Phos  3.0     10-27  Mg     2.0     10-28  Mg     2.0     10-27    TPro  6.1  /  Alb  2.9<L>  /  TBili  0.2  /  DBili  x   /  AST  21  /  ALT  22  /  AlkPhos  64  10-28  TPro  6.6  /  Alb  3.1<L>  /  TBili  0.2  /  DBili  x   /  AST  22  /  ALT  24  /  AlkPhos  69  10-27    CAPILLARY BLOOD GLUCOSE          LIVER FUNCTIONS - ( 28 Oct 2017 02:38 )  Alb: 2.9 g/dL / Pro: 6.1 g/dL / ALK PHOS: 64 u/L / ALT: 22 u/L / AST: 21 u/L / GGT: x           PT/INR - ( 28 Oct 2017 02:45 )   PT: 14.9 SEC;   INR: 1.32          PTT - ( 28 Oct 2017 02:45 )  PTT:27.7 SEC    Cultures:         < from: Xray Chest 1 View AP -PORTABLE-Routine (10.28.17 @ 05:36) >  TECHNIQUE:AP portable chest xray.    FINDINGS:     Postsurgical changes are seen in the left chest.  There is a left pigtail catheter with tip overlying the left apex.  The costophrenic angles not visualized in this image.  Apices are not fully imaged however no significant change pneumothorax is   appreciated.    Chronic changes to bilateral lungs are again noted.  Small left pleural effusion.    Bony structures are intact.  The cardiac silhouette is not accurately assessed in this projection.    IMPRESSION:  Postsurgical changes of left chest.  The apices and left costophrenic angle are not captured in this image.    Repeat chest x-ray can be obtained for further evaluation.                RUBEN GU M.D., RADIOLOGY RESIDENT  This document has beenelectronically signed.  HOUSTON JARAMILLO M.D., ATTENDING RADIOLOGIST  This document has been electronically signed. Oct 28 2017 11:18AM        < end of copied text >                      Studies  Chest X-RAY  CT SCAN Chest   Venous Dopplers: LE:   CT Abdomen  Others

## 2017-10-28 NOTE — PROGRESS NOTE ADULT - SUBJECTIVE AND OBJECTIVE BOX
OSCAR CHACKO  MRN-[id number]    HPI:  Patient is a 47 year old male with a PMHx of sarcoidosis, HTN, COPD, and asthma who presents to the ED with severe leg spasms. These intermittent left leg spasms began suddenly two days ago in the patient's home and have become more regular and worsening in contracture. The spasm began in the quad leading to severe erratic left leg movements that can last from seconds to minutes. Once the leg spasm and contracture stops, an "intense tingling and pain sensation" which starts in the foot and radiates up the left side of his body to his neck. Once the tingling sensation reached his neck, the patient became faint, but did not fully lose consciousness. The patient also had a HA at the time of the spasm and tingling which was diffuse throughout the head and rated a 10/10 on a pain scale. He denied ever having this severe of a HA or leg spasm prior this incident; the HA started yesterday and is still present today. Patient denies fever, chills, N/V/D, orthopnea, NPD, sputum, chest pain, leg swelling, and abdominal pain.     In E.D. pt found to have an abnormal EKG, where house cardiology for consulted.   On admission pt found to have moderate inspiratory and expiratory wheezes. (11 Sep 2017 07:22)      Procedure:    Left VATS and DAVID lobectomy 9/24/17   Re-op L thoracotomy, evacuation of hemothorax (600cc clot, 400cc blood) no obvious site of bleeding  9/25/17   Re-op L thoracotomy, evacuation of hemothorax; RTOR x2 100cc clot; 200cc blood: bleeding posterior intercostal arteries identified and cauterized 9/25/17  Pt was transferred to ICU because of increased SOB on the floor. Pt also had increased left apical pneumo for which I.R put a   Pigtail catheter. (Transferred back to ICU 2/2 increased SOB on telemetry and increased left apical ptx)    10/25/17  Pt was brought to ICU for acute SOB / Hypotension / Tachycardia / acute renal failure.   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 OOB to chair, breathing comfortably with minimal pain. Ambulated several times   A-line and mock d/carmen       PAST MEDICAL & SURGICAL HISTORY:  History of pneumothorax: History of 3 prior pneumonthoracies.  COPD (chronic obstructive pulmonary disease)  Asthma  Hypertension  Sarcoidosis  No significant past surgical history      ***VITAL SIGNS:  Vital Signs Last 24 Hrs  T(C): 36.2 (28 Oct 2017 04:00), Max: 36.9 (27 Oct 2017 12:00)  T(F): 97.1 (28 Oct 2017 04:00), Max: 98.4 (27 Oct 2017 12:00)  HR: 76 (28 Oct 2017 09:40) (69 - 101)  BP: 120/77 (28 Oct 2017 08:00) (99/63 - 126/81)  BP(mean): 86 (28 Oct 2017 08:00) (67 - 91)  RR: 21 (28 Oct 2017 08:00) (17 - 25)  SpO2: 98% (28 Oct 2017 08:00) (92% - 100%)  I/Os:   I&O's Detail    27 Oct 2017 07:01  -  28 Oct 2017 07:00  --------------------------------------------------------  IN:    IV PiggyBack: 100 mL  Total IN: 100 mL    OUT:    Chest Tube: 20 mL    Voided: 550 mL  Total OUT: 570 mL    Total NET: -470 mL      28 Oct 2017 07:01  -  28 Oct 2017 09:59  --------------------------------------------------------  IN:    Oral Fluid: 240 mL  Total IN: 240 mL    OUT:    Voided: 200 mL  Total OUT: 200 mL    Total NET: 40 mL        CAPILLARY BLOOD GLUCOSE        =======================  VENTILATOR SETTINGS  ===================    ======================= PATIENT CARE ACCESS DEVICES ===================  Peripheral IV  Central Venous Line	R	L	IJ	Fem	SC			Placed:   Arterial Line	R	L	PT	DP	Fem	Rad	Ax	Placed:   Midline:				  Urinary Catheter, Date Placed:   Necessity of urinary, arterial, and venous catheters discussed    ======================= PHYSICAL EXAM============================  General:                         Awake, alert, not in any distress  Neuro:                            Moving all extremities to commands. No acute change from baseline exam.	  Respiratory:	Lungs clear to auscultation bilaterally. L pigtail on waterseal, no leak                                          No rales, rhonchi. Effort even and unlabored.  CV:		Regular rate and rhythm. Normal S1/S2. No murmurs                                          Distal pulses present.  Abdomen:	 Soft, non-distended. Bowel sounds present / absent.   Skin:		No rash.  Extremities:	Warm, no cyanosis or edema.  Palpable pulses    ============================ LABS =========================                        9.5    11.68 )-----------( 60       ( 28 Oct 2017 02:45 )             30.1     10-28    143  |  101  |  23  ----------------------------<  128<H>  4.7   |  36<H>  |  0.91    Ca    9.1      28 Oct 2017 02:38  Phos  3.1     10-28  Mg     2.0     10-28    TPro  6.1  /  Alb  2.9<L>  /  TBili  0.2  /  DBili  x   /  AST  21  /  ALT  22  /  AlkPhos  64  10-28    LIVER FUNCTIONS - ( 28 Oct 2017 02:38 )  Alb: 2.9 g/dL / Pro: 6.1 g/dL / ALK PHOS: 64 u/L / ALT: 22 u/L / AST: 21 u/L / GGT: x           PT/INR - ( 28 Oct 2017 02:45 )   PT: 14.9 SEC;   INR: 1.32          PTT - ( 28 Oct 2017 02:45 )  PTT:27.7 SEC        ===================== IMAGING STUDIES =========================  CXR:  Left-sided pigtail catheter unchanged. Linear interstitial opacities   bilaterally secondary to sarcoidosis unchanged. Small left effusion.   Apical pneumothorax.     =======================  MEDICATIONS  =================  MEDICATIONS  (STANDING):  ALPRAZolam 0.25 milliGRAM(s) Oral every 8 hours  apixaban 5 milliGRAM(s) Oral every 12 hours  atorvastatin 80 milliGRAM(s) Oral at bedtime  buDESOnide 160 MICROgram(s)/formoterol 4.5 MICROgram(s) Inhaler 2 Puff(s) Inhalation every 12 hours  diVALproex  milliGRAM(s) Oral two times a day  docusate sodium 100 milliGRAM(s) Oral two times a day  ferrous    sulfate 325 milliGRAM(s) Oral every 8 hours  folic acid 1 milliGRAM(s) Oral daily  ipratropium    for Nebulization 500 MICROGram(s) Nebulizer every 6 hours  levalbuterol Inhalation 0.63 milliGRAM(s) Inhalation every 6 hours  methylPREDNISolone sodium succinate Injectable 20 milliGRAM(s) IV Push every 8 hours  midodrine 5 milliGRAM(s) Oral every 8 hours  pantoprazole    Tablet 40 milliGRAM(s) Oral two times a day before meals  polyethylene glycol 3350 17 Gram(s) Oral at bedtime  voriconazole 200 milliGRAM(s) Oral every 12 hours    MEDICATIONS  (PRN):  acetaminophen    Suspension 650 milliGRAM(s) Oral every 6 hours PRN For Temp greater than 38 C (100.4 F)  acetaminophen   Tablet. 650 milliGRAM(s) Oral every 6 hours PRN Mild Pain (1 - 3)  aluminum hydroxide/magnesium hydroxide/simethicone Suspension 30 milliLiter(s) Oral every 6 hours PRN Dyspepsia  HYDROmorphone  Injectable 1 milliGRAM(s) IV Push every 4 hours PRN Severe Pain (7 - 10)  temazepam 15 milliGRAM(s) Oral at bedtime PRN Insomnia      ====================== NEUROLOGY=====================  A+O x3, GCS 15, Nonfocal    ==================== RESPIRATORY======================  Pt is on 2  L nasal canula   Comfortable, not in any distress.  Using incentive spirometry    ==================== GASTROINTESTINAL===================  On regular diet, tolerating  Continue GI prophylaxis with Pepcid   Continue Zofran / Reglan for nausea - PRN	      =======================    ENDOCRIN  =====================  Glycemic monitoring  F/S with coverage  ===================HEMATOLOGIC/ONC ===================  Monitor chest tube output. No signs of active bleeding.   Follow CBC in AM  DVT prophylaxis with SCD, sc Heparin    ========================INFECTIOUS DISEASE================  No signs of infection. Monitor for fever / leukocytosis.  All surgical incision / chest tube  sites look clean    Pertinent clinical, laboratory, radiographic, hemodynamic, respiratory data,  and chart were reviewed and analyzed frequently throughout the course of the day and night. GI and DVT prophylaxis, glycemic control, head of bed elevation and skin care issues were addressed.  Patient seen, examined and plan discussed with CT Surgery / CTICU team during rounds. May transfer to floor        Qiuping Evgeny DO, FACEP [facility] CHACKO  MRN-[id number]    HPI:  Patient is a 47 year old male with a PMHx of sarcoidosis, HTN, COPD, and asthma who presents to the ED with severe leg spasms. These intermittent left leg spasms began suddenly two days ago in the patient's home and have become more regular and worsening in contracture. The spasm began in the quad leading to severe erratic left leg movements that can last from seconds to minutes. Once the leg spasm and contracture stops, an "intense tingling and pain sensation" which starts in the foot and radiates up the left side of his body to his neck. Once the tingling sensation reached his neck, the patient became faint, but did not fully lose consciousness. The patient also had a HA at the time of the spasm and tingling which was diffuse throughout the head and rated a 10/10 on a pain scale. He denied ever having this severe of a HA or leg spasm prior this incident; the HA started yesterday and is still present today. Patient denies fever, chills, N/V/D, orthopnea, NPD, sputum, chest pain, leg swelling, and abdominal pain.     In E.D. pt found to have an abnormal EKG, where house cardiology for consulted.   On admission pt found to have moderate inspiratory and expiratory wheezes. (11 Sep 2017 07:22)      Procedure:    Left VATS and DAVID lobectomy 9/24/17   Re-op L thoracotomy, evacuation of hemothorax (600cc clot, 400cc blood) no obvious site of bleeding  9/25/17   Re-op L thoracotomy, evacuation of hemothorax; RTOR x2 100cc clot; 200cc blood: bleeding posterior intercostal arteries identified and cauterized 9/25/17  Pt was transferred to ICU because of increased SOB on the floor. Pt also had increased left apical pneumo for which I.R put a   Pigtail catheter. (Transferred back to ICU 2/2 increased SOB on telemetry and increased left apical ptx)    10/25/17  Pt was brought to ICU for acute SOB / Hypotension / Tachycardia / acute renal failure.   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 OOB to chair, breathing comfortably with minimal pain. Ambulated several times   A-line and mock d/carmen       PAST MEDICAL & SURGICAL HISTORY:  History of pneumothorax: History of 3 prior pneumonthoracies.  COPD (chronic obstructive pulmonary disease)  Asthma  Hypertension  Sarcoidosis  No significant past surgical history      ***VITAL SIGNS:  Vital Signs Last 24 Hrs  T(C): 36.2 (28 Oct 2017 04:00), Max: 36.9 (27 Oct 2017 12:00)  T(F): 97.1 (28 Oct 2017 04:00), Max: 98.4 (27 Oct 2017 12:00)  HR: 76 (28 Oct 2017 09:40) (69 - 101)  BP: 120/77 (28 Oct 2017 08:00) (99/63 - 126/81)  BP(mean): 86 (28 Oct 2017 08:00) (67 - 91)  RR: 21 (28 Oct 2017 08:00) (17 - 25)  SpO2: 98% (28 Oct 2017 08:00) (92% - 100%)  I/Os:   I&O's Detail    27 Oct 2017 07:01  -  28 Oct 2017 07:00  --------------------------------------------------------  IN:    IV PiggyBack: 100 mL  Total IN: 100 mL    OUT:    Chest Tube: 20 mL    Voided: 550 mL  Total OUT: 570 mL    Total NET: -470 mL      28 Oct 2017 07:01  -  28 Oct 2017 09:59  --------------------------------------------------------  IN:    Oral Fluid: 240 mL  Total IN: 240 mL    OUT:    Voided: 200 mL  Total OUT: 200 mL    Total NET: 40 mL        CAPILLARY BLOOD GLUCOSE      ======================= PATIENT CARE ACCESS DEVICES ===================  Peripheral IV    ======================= PHYSICAL EXAM============================  General:                         Awake, alert, not in any distress  Neuro:                            Moving all extremities to commands. No acute change from baseline exam.	  Respiratory:	Lungs clear to auscultation bilaterally. L pigtail on waterseal, no leak                                          No rales, rhonchi. Effort even and unlabored.  CV:		Regular rate and rhythm. Normal S1/S2. No murmurs                                          Distal pulses present.  Abdomen:	 Soft, non-distended. Bowel sounds present / absent.   Skin:		No rash.  Extremities:	Warm, no cyanosis or edema.  Palpable pulses    ============================ LABS =========================                        9.5    11.68 )-----------( 60       ( 28 Oct 2017 02:45 )             30.1     10-28    143  |  101  |  23  ----------------------------<  128<H>  4.7   |  36<H>  |  0.91    Ca    9.1      28 Oct 2017 02:38  Phos  3.1     10-28  Mg     2.0     10-28    TPro  6.1  /  Alb  2.9<L>  /  TBili  0.2  /  DBili  x   /  AST  21  /  ALT  22  /  AlkPhos  64  10-28    LIVER FUNCTIONS - ( 28 Oct 2017 02:38 )  Alb: 2.9 g/dL / Pro: 6.1 g/dL / ALK PHOS: 64 u/L / ALT: 22 u/L / AST: 21 u/L / GGT: x           PT/INR - ( 28 Oct 2017 02:45 )   PT: 14.9 SEC;   INR: 1.32          PTT - ( 28 Oct 2017 02:45 )  PTT:27.7 SEC        ===================== IMAGING STUDIES =========================  CXR:  Left-sided pigtail catheter unchanged. Linear interstitial opacities   bilaterally secondary to sarcoidosis unchanged. Small left effusion.   Apical pneumothorax.     =======================  MEDICATIONS  =================  MEDICATIONS  (STANDING):  ALPRAZolam 0.25 milliGRAM(s) Oral every 8 hours  apixaban 5 milliGRAM(s) Oral every 12 hours  atorvastatin 80 milliGRAM(s) Oral at bedtime  buDESOnide 160 MICROgram(s)/formoterol 4.5 MICROgram(s) Inhaler 2 Puff(s) Inhalation every 12 hours  diVALproex  milliGRAM(s) Oral two times a day  docusate sodium 100 milliGRAM(s) Oral two times a day  ferrous    sulfate 325 milliGRAM(s) Oral every 8 hours  folic acid 1 milliGRAM(s) Oral daily  ipratropium    for Nebulization 500 MICROGram(s) Nebulizer every 6 hours  levalbuterol Inhalation 0.63 milliGRAM(s) Inhalation every 6 hours  methylPREDNISolone sodium succinate Injectable 20 milliGRAM(s) IV Push every 8 hours  midodrine 5 milliGRAM(s) Oral every 8 hours  pantoprazole    Tablet 40 milliGRAM(s) Oral two times a day before meals  polyethylene glycol 3350 17 Gram(s) Oral at bedtime  voriconazole 200 milliGRAM(s) Oral every 12 hours    MEDICATIONS  (PRN):  acetaminophen    Suspension 650 milliGRAM(s) Oral every 6 hours PRN For Temp greater than 38 C (100.4 F)  acetaminophen   Tablet. 650 milliGRAM(s) Oral every 6 hours PRN Mild Pain (1 - 3)  aluminum hydroxide/magnesium hydroxide/simethicone Suspension 30 milliLiter(s) Oral every 6 hours PRN Dyspepsia  HYDROmorphone  Injectable 1 milliGRAM(s) IV Push every 4 hours PRN Severe Pain (7 - 10)  temazepam 15 milliGRAM(s) Oral at bedtime PRN Insomnia      ====================== NEUROLOGY=====================  A+O x3, GCS 15, Nonfocal    ==================== RESPIRATORY======================  Pt is on 2  L nasal canula   Comfortable, not in any distress.  Using incentive spirometry    ==================== GASTROINTESTINAL===================  On regular diet, tolerating  Continue GI prophylaxis with Pepcid   Continue Zofran / Reglan for nausea - PRN	      =======================    ENDOCRIN  =====================  Glycemic monitoring  F/S with coverage  ===================HEMATOLOGIC/ONC ===================  Monitor chest tube output. No signs of active bleeding.   Follow CBC in AM  DVT prophylaxis with SCD, sc Heparin    ========================INFECTIOUS DISEASE================  No signs of infection. Monitor for fever / leukocytosis.  All surgical incision / chest tube  sites look clean    Pertinent clinical, laboratory, radiographic, hemodynamic, respiratory data,  and chart were reviewed and analyzed frequently throughout the course of the day and night. GI and DVT prophylaxis, glycemic control, head of bed elevation and skin care issues were addressed.  Patient seen, examined and plan discussed with CT Surgery / CTICU team during rounds. May transfer to floor        Flor Pepper DO FACEP

## 2017-10-29 DIAGNOSIS — D69.6 THROMBOCYTOPENIA, UNSPECIFIED: ICD-10-CM

## 2017-10-29 LAB
ALBUMIN SERPL ELPH-MCNC: 3 G/DL — LOW (ref 3.3–5)
ALP SERPL-CCNC: 68 U/L — SIGNIFICANT CHANGE UP (ref 40–120)
ALT FLD-CCNC: 20 U/L — SIGNIFICANT CHANGE UP (ref 4–41)
AST SERPL-CCNC: 16 U/L — SIGNIFICANT CHANGE UP (ref 4–40)
BASOPHILS # BLD AUTO: 0.01 K/UL — SIGNIFICANT CHANGE UP (ref 0–0.2)
BASOPHILS NFR BLD AUTO: 0.1 % — SIGNIFICANT CHANGE UP (ref 0–2)
BILIRUB SERPL-MCNC: 0.2 MG/DL — SIGNIFICANT CHANGE UP (ref 0.2–1.2)
BUN SERPL-MCNC: 23 MG/DL — SIGNIFICANT CHANGE UP (ref 7–23)
CALCIUM SERPL-MCNC: 9.3 MG/DL — SIGNIFICANT CHANGE UP (ref 8.4–10.5)
CHLORIDE SERPL-SCNC: 100 MMOL/L — SIGNIFICANT CHANGE UP (ref 98–107)
CO2 SERPL-SCNC: 35 MMOL/L — HIGH (ref 22–31)
CREAT SERPL-MCNC: 0.92 MG/DL — SIGNIFICANT CHANGE UP (ref 0.5–1.3)
EOSINOPHIL # BLD AUTO: 0 K/UL — SIGNIFICANT CHANGE UP (ref 0–0.5)
EOSINOPHIL NFR BLD AUTO: 0 % — SIGNIFICANT CHANGE UP (ref 0–6)
GLUCOSE SERPL-MCNC: 120 MG/DL — HIGH (ref 70–99)
HCT VFR BLD CALC: 33.7 % — LOW (ref 39–50)
HGB BLD-MCNC: 10.4 G/DL — LOW (ref 13–17)
IMM GRANULOCYTES # BLD AUTO: 0.04 # — SIGNIFICANT CHANGE UP
IMM GRANULOCYTES NFR BLD AUTO: 0.4 % — SIGNIFICANT CHANGE UP (ref 0–1.5)
LYMPHOCYTES # BLD AUTO: 0.7 K/UL — LOW (ref 1–3.3)
LYMPHOCYTES # BLD AUTO: 7.3 % — LOW (ref 13–44)
MCHC RBC-ENTMCNC: 30.9 % — LOW (ref 32–36)
MCHC RBC-ENTMCNC: 31.4 PG — SIGNIFICANT CHANGE UP (ref 27–34)
MCV RBC AUTO: 101.8 FL — HIGH (ref 80–100)
MONOCYTES # BLD AUTO: 0.48 K/UL — SIGNIFICANT CHANGE UP (ref 0–0.9)
MONOCYTES NFR BLD AUTO: 5 % — SIGNIFICANT CHANGE UP (ref 2–14)
NEUTROPHILS # BLD AUTO: 8.3 K/UL — HIGH (ref 1.8–7.4)
NEUTROPHILS NFR BLD AUTO: 87.2 % — HIGH (ref 43–77)
NRBC # FLD: 0 — SIGNIFICANT CHANGE UP
PLATELET # BLD AUTO: 67 K/UL — LOW (ref 150–400)
PMV BLD: 13.8 FL — HIGH (ref 7–13)
POTASSIUM SERPL-MCNC: 4.8 MMOL/L — SIGNIFICANT CHANGE UP (ref 3.5–5.3)
POTASSIUM SERPL-SCNC: 4.8 MMOL/L — SIGNIFICANT CHANGE UP (ref 3.5–5.3)
PROT SERPL-MCNC: 6.5 G/DL — SIGNIFICANT CHANGE UP (ref 6–8.3)
RBC # BLD: 3.31 M/UL — LOW (ref 4.2–5.8)
RBC # FLD: 15.9 % — HIGH (ref 10.3–14.5)
SODIUM SERPL-SCNC: 144 MMOL/L — SIGNIFICANT CHANGE UP (ref 135–145)
WBC # BLD: 9.53 K/UL — SIGNIFICANT CHANGE UP (ref 3.8–10.5)
WBC # FLD AUTO: 9.53 K/UL — SIGNIFICANT CHANGE UP (ref 3.8–10.5)

## 2017-10-29 PROCEDURE — 71010: CPT | Mod: 26

## 2017-10-29 PROCEDURE — 99222 1ST HOSP IP/OBS MODERATE 55: CPT | Mod: GC

## 2017-10-29 RX ORDER — MIDODRINE HYDROCHLORIDE 2.5 MG/1
2.5 TABLET ORAL EVERY 8 HOURS
Qty: 0 | Refills: 0 | Status: DISCONTINUED | OUTPATIENT
Start: 2017-10-29 | End: 2017-10-29

## 2017-10-29 RX ORDER — MIDODRINE HYDROCHLORIDE 2.5 MG/1
2.5 TABLET ORAL EVERY 8 HOURS
Qty: 0 | Refills: 0 | Status: DISCONTINUED | OUTPATIENT
Start: 2017-10-29 | End: 2017-10-30

## 2017-10-29 RX ADMIN — ATORVASTATIN CALCIUM 80 MILLIGRAM(S): 80 TABLET, FILM COATED ORAL at 21:51

## 2017-10-29 RX ADMIN — BUDESONIDE AND FORMOTEROL FUMARATE DIHYDRATE 2 PUFF(S): 160; 4.5 AEROSOL RESPIRATORY (INHALATION) at 21:51

## 2017-10-29 RX ADMIN — Medication 1 MILLIGRAM(S): at 13:34

## 2017-10-29 RX ADMIN — VORICONAZOLE 200 MILLIGRAM(S): 10 INJECTION, POWDER, LYOPHILIZED, FOR SOLUTION INTRAVENOUS at 17:37

## 2017-10-29 RX ADMIN — Medication 500 MICROGRAM(S): at 04:58

## 2017-10-29 RX ADMIN — LEVALBUTEROL 0.63 MILLIGRAM(S): 1.25 SOLUTION, CONCENTRATE RESPIRATORY (INHALATION) at 04:58

## 2017-10-29 RX ADMIN — Medication 325 MILLIGRAM(S): at 05:47

## 2017-10-29 RX ADMIN — LEVALBUTEROL 0.63 MILLIGRAM(S): 1.25 SOLUTION, CONCENTRATE RESPIRATORY (INHALATION) at 09:21

## 2017-10-29 RX ADMIN — LEVALBUTEROL 0.63 MILLIGRAM(S): 1.25 SOLUTION, CONCENTRATE RESPIRATORY (INHALATION) at 15:16

## 2017-10-29 RX ADMIN — APIXABAN 5 MILLIGRAM(S): 2.5 TABLET, FILM COATED ORAL at 06:07

## 2017-10-29 RX ADMIN — Medication 325 MILLIGRAM(S): at 13:34

## 2017-10-29 RX ADMIN — DIVALPROEX SODIUM 500 MILLIGRAM(S): 500 TABLET, DELAYED RELEASE ORAL at 17:37

## 2017-10-29 RX ADMIN — DIVALPROEX SODIUM 500 MILLIGRAM(S): 500 TABLET, DELAYED RELEASE ORAL at 05:47

## 2017-10-29 RX ADMIN — APIXABAN 5 MILLIGRAM(S): 2.5 TABLET, FILM COATED ORAL at 17:37

## 2017-10-29 RX ADMIN — Medication 20 MILLIGRAM(S): at 17:38

## 2017-10-29 RX ADMIN — Medication 500 MICROGRAM(S): at 15:16

## 2017-10-29 RX ADMIN — Medication 20 MILLIGRAM(S): at 05:47

## 2017-10-29 RX ADMIN — Medication 325 MILLIGRAM(S): at 21:51

## 2017-10-29 RX ADMIN — MIDODRINE HYDROCHLORIDE 2.5 MILLIGRAM(S): 2.5 TABLET ORAL at 13:34

## 2017-10-29 RX ADMIN — PANTOPRAZOLE SODIUM 40 MILLIGRAM(S): 20 TABLET, DELAYED RELEASE ORAL at 05:47

## 2017-10-29 RX ADMIN — MIDODRINE HYDROCHLORIDE 2.5 MILLIGRAM(S): 2.5 TABLET ORAL at 21:51

## 2017-10-29 RX ADMIN — BUDESONIDE AND FORMOTEROL FUMARATE DIHYDRATE 2 PUFF(S): 160; 4.5 AEROSOL RESPIRATORY (INHALATION) at 08:49

## 2017-10-29 RX ADMIN — VORICONAZOLE 200 MILLIGRAM(S): 10 INJECTION, POWDER, LYOPHILIZED, FOR SOLUTION INTRAVENOUS at 05:47

## 2017-10-29 RX ADMIN — Medication 500 MICROGRAM(S): at 09:21

## 2017-10-29 NOTE — PROGRESS NOTE ADULT - SUBJECTIVE AND OBJECTIVE BOX
Patient is a 47y old  Male who presents with a chief complaint of "My leg wouldn't stop shaking." (14 Sep 2017 09:59)  pt is doing ok   no SoB       Any change in ROS:     MEDICATIONS  (STANDING):  ALPRAZolam 0.25 milliGRAM(s) Oral every 8 hours  apixaban 5 milliGRAM(s) Oral every 12 hours  atorvastatin 80 milliGRAM(s) Oral at bedtime  buDESOnide 160 MICROgram(s)/formoterol 4.5 MICROgram(s) Inhaler 2 Puff(s) Inhalation every 12 hours  diVALproex  milliGRAM(s) Oral two times a day  docusate sodium 100 milliGRAM(s) Oral two times a day  ferrous    sulfate 325 milliGRAM(s) Oral every 8 hours  folic acid 1 milliGRAM(s) Oral daily  ipratropium    for Nebulization 500 MICROGram(s) Nebulizer every 6 hours  levalbuterol Inhalation 0.63 milliGRAM(s) Inhalation every 6 hours  methylPREDNISolone sodium succinate Injectable 20 milliGRAM(s) IV Push every 12 hours  midodrine 2.5 milliGRAM(s) Oral every 8 hours  pantoprazole    Tablet 40 milliGRAM(s) Oral two times a day before meals  polyethylene glycol 3350 17 Gram(s) Oral at bedtime  voriconazole 200 milliGRAM(s) Oral every 12 hours    MEDICATIONS  (PRN):  acetaminophen    Suspension 650 milliGRAM(s) Oral every 6 hours PRN For Temp greater than 38 C (100.4 F)  acetaminophen   Tablet. 650 milliGRAM(s) Oral every 6 hours PRN Mild Pain (1 - 3)  aluminum hydroxide/magnesium hydroxide/simethicone Suspension 30 milliLiter(s) Oral every 6 hours PRN Dyspepsia  HYDROmorphone  Injectable 1 milliGRAM(s) IV Push every 4 hours PRN Severe Pain (7 - 10)    Vital Signs Last 24 Hrs  T(C): 37.1 (29 Oct 2017 12:10), Max: 37.1 (29 Oct 2017 08:59)  T(F): 98.7 (29 Oct 2017 12:10), Max: 98.8 (29 Oct 2017 08:59)  HR: 85 (29 Oct 2017 12:10) (67 - 93)  BP: 121/75 (29 Oct 2017 12:10) (112/79 - 131/83)  BP(mean): 84 (29 Oct 2017 08:59) (84 - 89)  RR: 18 (29 Oct 2017 12:10) (18 - 26)  SpO2: 99% (29 Oct 2017 12:10) (97% - 100%)    I&O's Summary    28 Oct 2017 07:01  -  29 Oct 2017 07:00  --------------------------------------------------------  IN: 720 mL / OUT: 1200 mL / NET: -480 mL          Physical Exam:   GENERAL: NAD, well-groomed, well-developed  HEENT: GIOVANI/   Atraumatic, Normocephalic  ENMT: No tonsillar erythema, exudates, or enlargement; Moist mucous membranes, Good dentition, No lesions  NECK: Supple, No JVD, Normal thyroid  CHEST/LUNG: poor air entry bilaterally   CVS: Regular rate and rhythm; No murmurs, rubs, or gallops  GI: : Soft, Nontender, Nondistended; Bowel sounds present  NERVOUS SYSTEM:  Alert & Oriented X3  EXTREMITIES:  2+ Peripheral Pulses, No clubbing, cyanosis, or edema  LYMPH: No lymphadenopathy noted  SKIN: No rashes or lesions  ENDOCRINOLOGY: No Thyromegaly  PSYCH: Appropriate    Labs:                        10.4   9.53  )-----------( 67       ( 29 Oct 2017 05:43 )             33.7                         9.5    11.68 )-----------( 60       ( 28 Oct 2017 02:45 )             30.1                         10.7   16.11 )-----------( 61       ( 27 Oct 2017 06:10 )             33.4                         11.1   14.67 )-----------( 58       ( 26 Oct 2017 06:35 )             35.3                         11.3   16.25 )-----------( 53       ( 26 Oct 2017 05:30 )             36.1                         11.3   21.93 )-----------( 56       ( 25 Oct 2017 16:03 )             36.3     10-29    144  |  100  |  23  ----------------------------<  120<H>  4.8   |  35<H>  |  0.92  10-28    143  |  101  |  23  ----------------------------<  128<H>  4.7   |  36<H>  |  0.91  10-27    142  |  100  |  21  ----------------------------<  132<H>  4.9   |  30  |  0.89  10-26    139  |  99  |  22  ----------------------------<  142<H>  5.1   |  29  |  0.94  10-25    135  |  98  |  28<H>  ----------------------------<  110<H>  5.2   |  27  |  1.24    Ca    9.3      29 Oct 2017 05:43  Ca    9.1      28 Oct 2017 02:38  Phos  3.1     10-28  Mg     2.0     10-28    TPro  6.5  /  Alb  3.0<L>  /  TBili  0.2  /  DBili  x   /  AST  16  /  ALT  20  /  AlkPhos  68  10-29  TPro  6.1  /  Alb  2.9<L>  /  TBili  0.2  /  DBili  x   /  AST  21  /  ALT  22  /  AlkPhos  64  10-28  TPro  6.6  /  Alb  3.1<L>  /  TBili  0.2  /  DBili  x   /  AST  22  /  ALT  24  /  AlkPhos  69  10-27    CAPILLARY BLOOD GLUCOSE          LIVER FUNCTIONS - ( 29 Oct 2017 05:43 )  Alb: 3.0 g/dL / Pro: 6.5 g/dL / ALK PHOS: 68 u/L / ALT: 20 u/L / AST: 16 u/L / GGT: x           PT/INR - ( 28 Oct 2017 02:45 )   PT: 14.9 SEC;   INR: 1.32          PTT - ( 28 Oct 2017 02:45 )  PTT:27.7 SEC    Cultures:       < from: Xray Chest 1 View AP- PORTABLE-Urgent (10.29.17 @ 12:24) >  pneumothorax appreciated.    Bilateral interstitial opacities consistent with diagnosis of sarcoidosis.      COMPARISON:  October 28      IMPRESSION:  Follow-up studies post left thoracotomy with chest tube in   place, no pneumothorax. Small effusion.                  RANDALL AMADOR M.D.; ATTENDING RADIOLOGIST  This document has been electronically signed. Oct 29 2017 12:22PM    < end of copied text >                        Studies  Chest X-RAY  CT SCAN Chest   Venous Dopplers: LE:   CT Abdomen  Others

## 2017-10-29 NOTE — CONSULT NOTE ADULT - PROVIDER SPECIALTY LIST ADULT
Heme/Onc
Infectious Disease
Internal Medicine
MICU
Neurology
Pulmonology
Rehab Medicine
Rheumatology
Cardiology

## 2017-10-29 NOTE — PROGRESS NOTE ADULT - PROBLEM SELECTOR PLAN 2
pt has been having slowly decreasing platelets: he is on eliquis too: no bleeding. defer to primary team  10/22: please recheck his platelets  10/24: 81: stable: no bleeding  10/25: will be followed in cticu  10/26: cont to get worse: would suggest to get HEMONC  10/27: better slightly today: defer to cticu  10/28: 60: monitor for bleeding  10/28: ? hemonc

## 2017-10-29 NOTE — CONSULT NOTE ADULT - CONSULT REASON
Dynamic EKG changes
Antibiotic recommendations pre-op and post-op for Lobectomy.
Hematemesis
SOB/WHEEZING
eval for +HELENE
leg shaking - as per pt, chest congestion
rehab goals
thrombocytopenia
headache

## 2017-10-29 NOTE — CHART NOTE - NSCHARTNOTEFT_GEN_A_CORE
CXR after PTC clamped for multiple hours shows no PTX. D/w Dr. Caldera. Will keep clamped overnight and repeat CXR again tomorrow. If remains stable will d/c PTC. Heme consult called for thrombocytopenia as per Pulm request. Will Fu

## 2017-10-29 NOTE — CONSULT NOTE ADULT - CONSULT REQUESTED DATE/TIME
11-Sep-2017 01:42
11-Sep-2017
11-Sep-2017 16:54
11-Sep-2017 22:02
15-Sep-2017 14:44
18-Sep-2017 15:57
20-Sep-2017 10:17
23-Sep-2017 16:31
29-Oct-2017 16:24

## 2017-10-29 NOTE — PROGRESS NOTE ADULT - PROBLEM SELECTOR PLAN 1
s/p blood patch, but still with air leak: defer to thoracici surgery  10/22: increased on yesterdays chest -ray: failed blood patch.....next step? pleurodesis  10/24: s/p pleurodesis: air leaking:  10/25: in the afternoon he became acutely hypercarbic: transfered down to cticu: will need bipap help: plus also he is febrile? talc reaction: have a low threshold to starting antibiotcs  10/26: s/p pleurodesis: developed inflammatory response: currently on low dose vasopressors : defer to thoracic surgery  10/27: persistent hydropneumothorax left side: s/p pleurodesis  10/28: doing better: no air leak  10/29: no air leak: has clamped tube today : rpt chest x-ray

## 2017-10-29 NOTE — CONSULT NOTE ADULT - PROBLEM SELECTOR PROBLEM 1
Hemiplegia following cerebrovascular accident (CVA)
Near syncope
Near syncope
Thrombocytopenia
Headache

## 2017-10-29 NOTE — PROGRESS NOTE ADULT - SUBJECTIVE AND OBJECTIVE BOX
Subjective: "I get pain in my side when I cough but generally I'm doing ok. A little better." Pt. states some SOB with exertion. Currently in NAD. Denies CP. AMb w assist in hallway. Feels Left LE and Left UE weakness improving slowly.     Vital Signs:  Vital Signs Last 24 Hrs  T(C): 37.1 (10-29-17 @ 08:59), Max: 37.1 (10-29-17 @ 08:59)  T(F): 98.8 (10-29-17 @ 08:59), Max: 98.8 (10-29-17 @ 08:59)  HR: 85 (10-29-17 @ 09:24) (67 - 93)  BP: 119/74 (10-29-17 @ 08:59) (107/68 - 131/83)  RR: 18 (10-29-17 @ 08:59) (18 - 26)  SpO2: 97% (10-29-17 @ 09:24) (96% - 100%) on (O2)    Telemetry/Alarms:  General: WN/WD NAD  Neurology: Awake, nonfocal, BETH x 4 Motor strength bilat LE 5/5. Motor strength to Left UE 3-4/5.   Eyes: Scleras clear, PERRLA/ EOMI, Gross vision intact  ENT:Gross hearing intact, grossly patent pharynx, no stridor  Neck: Neck supple, trachea midline, No JVD,   Respiratory: CTA decreased throughout Left side. CTA rt. side. No wheezing, rales, rhonchi  CV: RRR, S1S2, no murmurs, rubs or gallops  Abdominal: Soft, NT, ND +BS, + BM today. States good appetite  Extremities: Trace LE edema, + peripheral pulses  Skin: No Rashes, Hematoma, Ecchymosis  Lymphatic: No Neck, axilla, groin LAD  Psych: Oriented x 3, normal affect  Incisions: left  thoracotomy c/d/i. Old VATS w CT sutures C/d/i  Tubes: left PTC with minimal output. No AL on WS.   Relevant labs, radiology and Medications reviewed           CXR with no obvious ptx.              10.4   9.53  )-----------( 67       ( 29 Oct 2017 05:43 )             33.7     10-29    144  |  100  |  23  ----------------------------<  120<H>  4.8   |  35<H>  |  0.92    Ca    9.3      29 Oct 2017 05:43  Phos  3.1     10-28  Mg     2.0     10-28    TPro  6.5  /  Alb  3.0<L>  /  TBili  0.2  /  DBili  x   /  AST  16  /  ALT  20  /  AlkPhos  68  10-29    PT/INR - ( 28 Oct 2017 02:45 )   PT: 14.9 SEC;   INR: 1.32          PTT - ( 28 Oct 2017 02:45 )  PTT:27.7 SEC  MEDICATIONS  (STANDING):  ALPRAZolam 0.25 milliGRAM(s) Oral every 8 hours  apixaban 5 milliGRAM(s) Oral every 12 hours  atorvastatin 80 milliGRAM(s) Oral at bedtime  buDESOnide 160 MICROgram(s)/formoterol 4.5 MICROgram(s) Inhaler 2 Puff(s) Inhalation every 12 hours  diVALproex  milliGRAM(s) Oral two times a day  docusate sodium 100 milliGRAM(s) Oral two times a day  ferrous    sulfate 325 milliGRAM(s) Oral every 8 hours  folic acid 1 milliGRAM(s) Oral daily  ipratropium    for Nebulization 500 MICROGram(s) Nebulizer every 6 hours  levalbuterol Inhalation 0.63 milliGRAM(s) Inhalation every 6 hours  methylPREDNISolone sodium succinate Injectable 20 milliGRAM(s) IV Push every 12 hours  midodrine 5 milliGRAM(s) Oral every 8 hours  pantoprazole    Tablet 40 milliGRAM(s) Oral two times a day before meals  polyethylene glycol 3350 17 Gram(s) Oral at bedtime  voriconazole 200 milliGRAM(s) Oral every 12 hours    MEDICATIONS  (PRN):  acetaminophen    Suspension 650 milliGRAM(s) Oral every 6 hours PRN For Temp greater than 38 C (100.4 F)  acetaminophen   Tablet. 650 milliGRAM(s) Oral every 6 hours PRN Mild Pain (1 - 3)  aluminum hydroxide/magnesium hydroxide/simethicone Suspension 30 milliLiter(s) Oral every 6 hours PRN Dyspepsia  HYDROmorphone  Injectable 1 milliGRAM(s) IV Push every 4 hours PRN Severe Pain (7 - 10)    Pertinent Physical Exam  I&O's Summary    28 Oct 2017 07:01  -  29 Oct 2017 07:00  --------------------------------------------------------  IN: 720 mL / OUT: 1200 mL / NET: -480 mL        Assessment  47y Male  w/ PAST MEDICAL & SURGICAL HISTORY:  History of pneumothorax: History of 3 prior pneumonthoracies.  COPD (chronic obstructive pulmonary disease)  Asthma  Hypertension  Sarcoidosis  No significant past surgical history  admitted with complaints of Patient is a 47y old  Male who presents with a chief complaint of "My leg wouldn't stop shaking." (14 Sep 2017 09:59)  .46yo Male w h/o sarcoid, afib. Admitted with new onset CVA. Developed hemoptysis.  PAF- AC on hold due to hemoptysis; 9/21-FB/BAL done by Dr. Arias. Intraop: Bleeding from DAVID (non-lingular)-Recom IR to embolize; 9/22 CT angio done per IR Lobko request.  Formal PFTS and ask Card to fix PFO now to decrease blood flow; 9/24- Developed worsening hemoptysis. Emergently kristel to OR and had  Left thoractomy DAVID. Brought to CTICU. Pt on thoracic service now 9/24: 1400ml EBL in OR; 10 u PRBCs;9/25-Brought back to OR for bleeding. Had Reop Thoracotomy, evac left hemothorax. CTI- DAVID space despite -40 Sxn, MEYER, L Hemiparesis (CVA) better w walking; F/u ID- Voriconazole to 11/4/17; Plan acute rehab 10/6 IR L PTC for PTX; 10/13 WS today if CXR ok - incr PTX -> back to suction at all times; 10/14: sutured leaking old c-tube site; 10/15 CXR small PTX no change 10/16 WS trial failed-> back to sxn; DC planning rehab eventually; 10/18 failed WS; blood patch 10/19-> on sxn, CXR stable but still AL; 10/21 C-tube to WS & pt got SOB & needed O2, back on SXN & Air leak is back. LLE swelling, Dopplers [id number] Bedside pleurodesis tolerated gymnastics for 1hr + 50min. YT=149; No wsxn 48-72 h; AL resolved; Merishi - check PLT -> stable at 79; Tachy & hypoTN - 1 L NS, albumin, sob- lasix 20 IVx1. Developed SIRS after pleurodesis-> CTICU; On pressors. Pt. clinically improved in CTICU. Maintained on Steroids, weaned down to Solumedrol BID. Off pressors and transitioned to Midodrine. Still with thrombocytopenia but no bleeding. Continued on Eliquis but no ASA for h/o CVA. Neuro deficits improving. Cont antifungals. Air leak resolved on 10/27. CXR- on WS- lung expanded, No AL still. 10/28-Transferred back to [facility].   PLAN  Neuro: Pain management. Cont oral regiment. Cont to monitor motor strength. Cont Eliquis for CVA.   Pulm: Encourage coughing, deep breathing and use of incentive spirometry. Wean off supplemental oxygen as able. Daily CXR. Will clamp PTC today and rpt CXR later. Cont steroids as per Pulmonology.   Cardio: Monitor telemetry/alarms. MOnitor for hypotension, cont Midodrine for now.   GI: Tolerating diet. Continue stool softeners. Encourage po intake.   Renal: monitor urine output, supplement electrolytes as needed  Vasc: Heparin SC/SCDs for DVT prophylaxis. Cont Eliquis.   Heme: Stable H/H. Plts 67, monitor thrombocytopenia, currently improving. No s/s bleeding.   ID: Cont Voriconazole until 11/4/17.  Therapy: OOB/ambulate w PT. Plan Acute rehab.   Tubes: Monitor PTC output, will clamp and rpt CXR. IF stable, will d/c PTC. As per Dr. Caldera.   Disposition: Aim to D/C to Rehab in next 1-2 days.  Discussed with Cardiothoracic Team at AM rounds.

## 2017-10-29 NOTE — PROGRESS NOTE ADULT - PROBLEM SELECTOR PLAN 4
10/8: cont prednisone with BD  10/09: his wheezing has increased today: Would change to IV METHYLPREDNISOLONE: 20 MG Q 8 HOURS  1/10was on 40 mg three times a day: dw icu attending , to decrease it to 20 q 8 hours  11/11: would change to po prednisone 40 mg daily  10/12: on steroids for now with slow taper  10/13: decrease prednisone to 30 mg tomorrow  10/14: prednisone 30 md every day : and then cut down to 20 mg  10/15: cont prednisone  10/16: on tapering steroids  10/17: no wheezing pt/ot  10/19: mild wheezing today : start duoneb ATC  10/20: give duoneb q 6 hours ATC  10/21: still with off and on wheezing: cont 20 mg of steroids  10/22: decrease steroids to 10 mg a day  10/25: started on high dose steroids by cticu  10/26: pt was wheezing yesterday as well as was acutely hypercarbic: the steroids were increased and switched to IV: Decrease IV steroids to q 8 hours  10/17: it is very difficult to taper steroids on  him as when they are decreased, he starts wheezing again  10/28: decrease solumedrol to 20 mg twice a day !!  10/29: tomorrow switch to po predniosne 40 mg per day, he should be dced on steroids now

## 2017-10-29 NOTE — CONSULT NOTE ADULT - PROBLEM SELECTOR RECOMMENDATION 9
Likely multifactorial. Fluctuating with natalia of 57k on 9/25 with subsequent normalization and then again with downtrend, currently count of 67k which seems to be improving. Unlikely a production problem. Pt has had a prolonged hospital stay, with underlying sarcoid and aspergillosis, has been on zosyn and now on voriconazole. Was also on valproic acid from 9/15 - 9/24 which has been associated with thrombocytopenia (26-30% incidence). Therefore could be medication induced. Pt has been exposed to heparin products and HIT is a possibility, however, the timing of platelet drop and the fluctuating pattern is not consistent with HIT. LE Dopplers were also negative during this hospital stay. No splenomegaly appreciated on exam. HIV and Hep Panel negative, no history of heavy EtOH use.   - check D-dimer and fibrinogen to r/o any consumptive process  - can check platelets in blue top tube  - will obtain peripheral blood smear   - continue to trend, no signs of bleeding currently, however if platelets below 50k, may be at a higher risk of bleeding as he is on eliquis as well Likely multifactorial. Fluctuating with natalia of 57k on 9/25 with subsequent normalization and then again with downtrend, currently count of 67k which seems to be improving. Unlikely a production problem. Pt has had a prolonged hospital stay, with underlying sarcoid and aspergillosis, has been on zosyn and now on voriconazole. Was also on valproic acid from 9/15 - 9/24 which has been associated with thrombocytopenia (26-30% incidence). Therefore could be medication induced. Pt has been exposed to heparin products and HIT is a possibility, however, the timing of platelet drop and the fluctuating pattern is not consistent with HIT. LE Dopplers were also negative during this hospital stay. No splenomegaly appreciated on exam. HIV and Hep Panel negative, no history of heavy EtOH use.   - check D-dimer and fibrinogen to r/o any consumptive process  - can check platelets in blue top tube, check LDH and haptoglobin as well  - will obtain peripheral blood smear   - continue to trend, no signs of bleeding currently, however if platelets below 50k, may be at a higher risk of bleeding as he is on Eliquis as well

## 2017-10-29 NOTE — CONSULT NOTE ADULT - ATTENDING COMMENTS
Pt. examined and case reviewed in detail on bedside rounds.
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 For surgical resection.  Reasonable to cover with broad spectrum antibiotic and antifungal directed against aspergillus while await OR cultures/ path.
Please call [phone] if you have questions or concerns
Agree with above.  Patient seen and examined with Fellow.  I think that this is multifactorial thrombocytopenia.  As his clinical condition improves, I believe platelet count will again normalize.  We will follow counts daily for now and see him intermittently.
Patient seen and examined with neurology team and above note reviewed and I agree with assessment and plan as outlined. We will proceed with baclofen 5mg twice daily along with valium 5 mg for his lower extremity spasms. His headaches have no resolved and so there is no need for MRI brain at this time. Physical therapy evaluation and continue medical and supportive care and all questions answered and he and his wife understood the plan.
Patient seen and examined.  Doubt ACS--leg spasms persist and unlikely due to cardiac cause.  Recommend neurology and pulmonary evaluations.  Echocardiogram to evaluate LV function pending.

## 2017-10-29 NOTE — CONSULT NOTE ADULT - ASSESSMENT
47 year old male with a PMHx of sarcoidosis, HTN, COPD, admitted with R frontoparietal CVA, AFib course c/b hemoptysis requiring L thoracotomy, DAVID lobectomy and evacuation of hemothorax, being treated for aspergillosis, with fluctuating platelet count during this admission

## 2017-10-30 LAB
BUN SERPL-MCNC: 22 MG/DL — SIGNIFICANT CHANGE UP (ref 7–23)
CALCIUM SERPL-MCNC: 9.4 MG/DL — SIGNIFICANT CHANGE UP (ref 8.4–10.5)
CHLORIDE SERPL-SCNC: 99 MMOL/L — SIGNIFICANT CHANGE UP (ref 98–107)
CO2 SERPL-SCNC: 34 MMOL/L — HIGH (ref 22–31)
CREAT SERPL-MCNC: 0.73 MG/DL — SIGNIFICANT CHANGE UP (ref 0.5–1.3)
D DIMER BLD IA.RAPID-MCNC: 738 NG/ML — SIGNIFICANT CHANGE UP
FIBRINOGEN PPP-MCNC: 757 MG/DL — HIGH (ref 310–510)
GLUCOSE SERPL-MCNC: 104 MG/DL — HIGH (ref 70–99)
HAPTOGLOB SERPL-MCNC: 431 MG/DL — HIGH (ref 34–200)
HCT VFR BLD CALC: 32.9 % — LOW (ref 39–50)
HGB BLD-MCNC: 10.2 G/DL — LOW (ref 13–17)
LDH SERPL L TO P-CCNC: 140 U/L — SIGNIFICANT CHANGE UP (ref 135–225)
MCHC RBC-ENTMCNC: 30.4 PG — SIGNIFICANT CHANGE UP (ref 27–34)
MCHC RBC-ENTMCNC: 31 % — LOW (ref 32–36)
MCV RBC AUTO: 97.9 FL — SIGNIFICANT CHANGE UP (ref 80–100)
NRBC # FLD: 0 — SIGNIFICANT CHANGE UP
PLATELET # BLD AUTO: 78 K/UL — LOW (ref 150–400)
PMV BLD: 12.8 FL — SIGNIFICANT CHANGE UP (ref 7–13)
POTASSIUM SERPL-MCNC: 4.7 MMOL/L — SIGNIFICANT CHANGE UP (ref 3.5–5.3)
POTASSIUM SERPL-SCNC: 4.7 MMOL/L — SIGNIFICANT CHANGE UP (ref 3.5–5.3)
RBC # BLD: 3.36 M/UL — LOW (ref 4.2–5.8)
RBC # FLD: 15.6 % — HIGH (ref 10.3–14.5)
SODIUM SERPL-SCNC: 142 MMOL/L — SIGNIFICANT CHANGE UP (ref 135–145)
WBC # BLD: 10.54 K/UL — HIGH (ref 3.8–10.5)
WBC # FLD AUTO: 10.54 K/UL — HIGH (ref 3.8–10.5)

## 2017-10-30 PROCEDURE — 71010: CPT | Mod: 26

## 2017-10-30 RX ADMIN — MIDODRINE HYDROCHLORIDE 2.5 MILLIGRAM(S): 2.5 TABLET ORAL at 05:24

## 2017-10-30 RX ADMIN — VORICONAZOLE 200 MILLIGRAM(S): 10 INJECTION, POWDER, LYOPHILIZED, FOR SOLUTION INTRAVENOUS at 17:17

## 2017-10-30 RX ADMIN — Medication 40 MILLIGRAM(S): at 13:02

## 2017-10-30 RX ADMIN — LEVALBUTEROL 0.63 MILLIGRAM(S): 1.25 SOLUTION, CONCENTRATE RESPIRATORY (INHALATION) at 23:02

## 2017-10-30 RX ADMIN — ATORVASTATIN CALCIUM 80 MILLIGRAM(S): 80 TABLET, FILM COATED ORAL at 21:19

## 2017-10-30 RX ADMIN — Medication 1 MILLIGRAM(S): at 13:02

## 2017-10-30 RX ADMIN — PANTOPRAZOLE SODIUM 40 MILLIGRAM(S): 20 TABLET, DELAYED RELEASE ORAL at 05:24

## 2017-10-30 RX ADMIN — Medication 650 MILLIGRAM(S): at 19:20

## 2017-10-30 RX ADMIN — BUDESONIDE AND FORMOTEROL FUMARATE DIHYDRATE 2 PUFF(S): 160; 4.5 AEROSOL RESPIRATORY (INHALATION) at 21:19

## 2017-10-30 RX ADMIN — Medication 650 MILLIGRAM(S): at 18:41

## 2017-10-30 RX ADMIN — VORICONAZOLE 200 MILLIGRAM(S): 10 INJECTION, POWDER, LYOPHILIZED, FOR SOLUTION INTRAVENOUS at 05:25

## 2017-10-30 RX ADMIN — Medication 500 MICROGRAM(S): at 11:29

## 2017-10-30 RX ADMIN — Medication 500 MICROGRAM(S): at 23:02

## 2017-10-30 RX ADMIN — APIXABAN 5 MILLIGRAM(S): 2.5 TABLET, FILM COATED ORAL at 05:24

## 2017-10-30 RX ADMIN — PANTOPRAZOLE SODIUM 40 MILLIGRAM(S): 20 TABLET, DELAYED RELEASE ORAL at 17:17

## 2017-10-30 RX ADMIN — Medication 500 MICROGRAM(S): at 16:57

## 2017-10-30 RX ADMIN — Medication 500 MICROGRAM(S): at 06:06

## 2017-10-30 RX ADMIN — DIVALPROEX SODIUM 500 MILLIGRAM(S): 500 TABLET, DELAYED RELEASE ORAL at 05:24

## 2017-10-30 RX ADMIN — LEVALBUTEROL 0.63 MILLIGRAM(S): 1.25 SOLUTION, CONCENTRATE RESPIRATORY (INHALATION) at 11:29

## 2017-10-30 RX ADMIN — LEVALBUTEROL 0.63 MILLIGRAM(S): 1.25 SOLUTION, CONCENTRATE RESPIRATORY (INHALATION) at 16:57

## 2017-10-30 RX ADMIN — APIXABAN 5 MILLIGRAM(S): 2.5 TABLET, FILM COATED ORAL at 17:17

## 2017-10-30 RX ADMIN — Medication 325 MILLIGRAM(S): at 05:25

## 2017-10-30 RX ADMIN — Medication 20 MILLIGRAM(S): at 05:26

## 2017-10-30 RX ADMIN — LEVALBUTEROL 0.63 MILLIGRAM(S): 1.25 SOLUTION, CONCENTRATE RESPIRATORY (INHALATION) at 06:06

## 2017-10-30 RX ADMIN — BUDESONIDE AND FORMOTEROL FUMARATE DIHYDRATE 2 PUFF(S): 160; 4.5 AEROSOL RESPIRATORY (INHALATION) at 08:42

## 2017-10-30 RX ADMIN — Medication 325 MILLIGRAM(S): at 13:02

## 2017-10-30 RX ADMIN — DIVALPROEX SODIUM 500 MILLIGRAM(S): 500 TABLET, DELAYED RELEASE ORAL at 17:17

## 2017-10-30 RX ADMIN — Medication 325 MILLIGRAM(S): at 21:19

## 2017-10-30 NOTE — PROGRESS NOTE ADULT - SUBJECTIVE AND OBJECTIVE BOX
Subjective: "I feel good today, no new problems with my breathing" Walking more frequently. Using IS. Still requiring O2. Chest tube clamped since yesterday.     Vital Signs:  Vital Signs Last 24 Hrs  T(C): 37.2 (10-30-17 @ 08:38), Max: 37.2 (10-30-17 @ 08:38)  T(F): 98.9 (10-30-17 @ 08:38), Max: 98.9 (10-30-17 @ 08:38)  HR: 64 (10-30-17 @ 11:31) (64 - 87)  BP: 134/71 (10-30-17 @ 08:38) (121/82 - 139/88)  RR: 18 (10-30-17 @ 08:38) (17 - 18)  SpO2: 96% (10-30-17 @ 11:31) (96% - 100%) on (O2)    Telemetry/Alarms:  General: WN/WD NAD  Neurology: Awake, nonfocal, BETH x 4 left UE weakness 3-4/5, 4/5 Left LE weakness all improving.   Eyes: Scleras clear, PERRLA/ EOMI, Gross vision intact  ENT:Gross hearing intact, grossly patent pharynx, no stridor  Neck: Neck supple, trachea midline, No JVD,   Respiratory: decreased throughout left side. , No wheezing, rales, rhonchi  CV: RRR, S1S2, no murmurs, rubs or gallops  Abdominal: Soft, NT, ND +BS, +BM, improved appetite per pt.   Extremities: Trace LE edema, + peripheral pulses  Skin: No Rashes, Hematoma, Ecchymosis  Lymphatic: No Neck, axilla, groin LAD  Psych: Oriented x 3, normal affect  Incisions: Left thoracotomy VATS BENTLEY c/d/i healing  Tubes: Left PTC clamped  Relevant labs, radiology and Medications reviewed           CXR this am no ptx. PTC removed earlier this am at bedside. FU CXR confirmed w Dr. Alvarado radiology no PTX as well.              10.2   10.54 )-----------( 78       ( 30 Oct 2017 05:37 )             32.9     10-30    142  |  99  |  22  ----------------------------<  104<H>  4.7   |  34<H>  |  0.73    Ca    9.4      30 Oct 2017 05:37    TPro  6.5  /  Alb  3.0<L>  /  TBili  0.2  /  DBili  x   /  AST  16  /  ALT  20  /  AlkPhos  68  10-29      MEDICATIONS  (STANDING):  ALPRAZolam 0.25 milliGRAM(s) Oral every 8 hours  apixaban 5 milliGRAM(s) Oral every 12 hours  atorvastatin 80 milliGRAM(s) Oral at bedtime  buDESOnide 160 MICROgram(s)/formoterol 4.5 MICROgram(s) Inhaler 2 Puff(s) Inhalation every 12 hours  diVALproex  milliGRAM(s) Oral two times a day  docusate sodium 100 milliGRAM(s) Oral two times a day  ferrous    sulfate 325 milliGRAM(s) Oral every 8 hours  folic acid 1 milliGRAM(s) Oral daily  ipratropium    for Nebulization 500 MICROGram(s) Nebulizer every 6 hours  levalbuterol Inhalation 0.63 milliGRAM(s) Inhalation every 6 hours  midodrine 2.5 milliGRAM(s) Oral every 8 hours  pantoprazole    Tablet 40 milliGRAM(s) Oral two times a day before meals  polyethylene glycol 3350 17 Gram(s) Oral at bedtime  predniSONE   Tablet 40 milliGRAM(s) Oral daily  predniSONE   Tablet   Oral   voriconazole 200 milliGRAM(s) Oral every 12 hours    MEDICATIONS  (PRN):  acetaminophen    Suspension 650 milliGRAM(s) Oral every 6 hours PRN For Temp greater than 38 C (100.4 F)  acetaminophen   Tablet. 650 milliGRAM(s) Oral every 6 hours PRN Mild Pain (1 - 3)  aluminum hydroxide/magnesium hydroxide/simethicone Suspension 30 milliLiter(s) Oral every 6 hours PRN Dyspepsia  HYDROmorphone  Injectable 1 milliGRAM(s) IV Push every 4 hours PRN Severe Pain (7 - 10)    Pertinent Physical Exam  I&O's Summary    29 Oct 2017 07:01  -  30 Oct 2017 07:00  --------------------------------------------------------  IN: 0 mL / OUT: 300 mL / NET: -300 mL        Assessment  47y Male  w/ PAST MEDICAL & SURGICAL HISTORY:  History of pneumothorax: History of 3 prior pneumonthoracies.  COPD (chronic obstructive pulmonary disease)  Asthma  Hypertension  Sarcoidosis  No significant past surgical history  admitted with complaints of Patient is a 47y old  Male who presents with a chief complaint of "My leg wouldn't stop shaking." (14 Sep 2017 09:59)  48yo Male w h/o sarcoid, afib. Admitted with new onset CVA. Developed hemoptysis.  PAF- AC on hold due to hemoptysis; 9/21-FB/BAL done by Dr. Arias. Intraop: Bleeding from DAVID (non-lingular)-Recom IR to embolize; 9/22 CT angio done per IR Lobko request.  Formal PFTS and ask Card to fix PFO now to decrease blood flow; 9/24- Developed worsening hemoptysis. Emergently kristel to OR and had  Left thoractomy DAVID. Brought to CTICU. Pt on thoracic service now 9/24: 1400ml EBL in OR; 10 u PRBCs;9/25-Brought back to OR for bleeding. Had Reop Thoracotomy, evac left hemothorax. CTI- DAVID space despite -40 Sxn, MEYER, L Hemiparesis (CVA) better w walking; F/u ID- Voriconazole to 11/4/17; Plan acute rehab 10/6 IR L PTC for PTX; 10/13 WS today if CXR ok - incr PTX -> back to suction at all times; 10/14: sutured leaking old c-tube site; 10/15 CXR small PTX no change 10/16 WS trial failed-> back to sxn; DC planning rehab eventually; 10/18 failed WS; blood patch 10/19-> on sxn, CXR stable but still AL; 10/21 C-tube to WS & pt got SOB & needed O2, back on SXN & Air leak is back. LLE swelling, Dopplers [id number] Bedside pleurodesis tolerated gymnastics for 1hr + 50min. KI=139; No wsxn 48-72 h; AL resolved; Merishi - check PLT -> stable at 79; Tachy & hypoTN - 1 L NS, albumin, sob- lasix 20 IVx1. Developed SIRS after pleurodesis-> CTICU; On pressors. Pt. clinically improved in CTICU. Maintained on Steroids, weaned down to Solumedrol BID. Off pressors and transitioned to Midodrine. Still with thrombocytopenia but no bleeding. Continued on Eliquis but no ASA for h/o CVA. Neuro deficits improving. Cont antifungals. Air leak resolved on 10/27. CXR- on WS- lung expanded, No AL still. 10/28-Transferred back to [facility]. 10/29- Left PTC clamped, FU CXR no ptx    PLAN  Neuro: Pain management  Pulm: Encourage coughing, deep breathing and use of incentive spirometry. Wean off supplemental oxygen as able. Daily CXR. Will contact Pulm abt changing to po steroids.   Cardio: Monitor telemetry/alarms. Cont Eliquis  GI: Tolerating diet. Continue stool softeners. Encourage po intake  Renal: monitor urine output, supplement electrolytes as needed  Vasc: Heparin SC/SCDs for DVT prophylaxis. Cont A/c for PE/DVT  Heme: Stable H/H, thrombocytopenia improving. Heme consult appreciated. NO s/s of bleeding. .   ID: Cont Voriconazole until 11/4/17.  Therapy: OOB/ambulate  Tubes: PTC removed.   Disposition: Aim to D/C to acute rehab tomm.   Discussed with Cardiothoracic Team at AM rounds.

## 2017-10-30 NOTE — PROGRESS NOTE ADULT - PROBLEM SELECTOR PLAN 4
10/8: cont prednisone with BD  10/09: his wheezing has increased today: Would change to IV METHYLPREDNISOLONE: 20 MG Q 8 HOURS  1/10was on 40 mg three times a day: dw icu attending , to decrease it to 20 q 8 hours  11/11: would change to po prednisone 40 mg daily  10/12: on steroids for now with slow taper  10/13: decrease prednisone to 30 mg tomorrow  10/14: prednisone 30 md every day : and then cut down to 20 mg  10/15: cont prednisone  10/16: on tapering steroids  10/17: no wheezing pt/ot  10/19: mild wheezing today : start duoneb ATC  10/20: give duoneb q 6 hours ATC  10/21: still with off and on wheezing: cont 20 mg of steroids  10/22: decrease steroids to 10 mg a day  10/25: started on high dose steroids by cticu  10/26: pt was wheezing yesterday as well as was acutely hypercarbic: the steroids were increased and switched to IV: Decrease IV steroids to q 8 hours  10/17: it is very difficult to taper steroids on  him as when they are decreased, he starts wheezing again  10/28: decrease solumedrol to 20 mg twice a day !!  10/29: tomorrow switch to po predniosne 40 mg per day, he should be dced on steroids now  10/30: switched to po prednisone : taper slowly

## 2017-10-30 NOTE — PROGRESS NOTE ADULT - PROBLEM SELECTOR PLAN 2
pt has been having slowly decreasing platelets: he is on eliquis too: no bleeding. defer to primary team  10/22: please recheck his platelets  10/24: 81: stable: no bleeding  10/25: will be followed in cticu  10/26: cont to get worse: would suggest to get HEMONC  10/27: better slightly today: defer to cticu  10/28: 60: monitor for bleeding  10/28: ? hemonc  10/30: better today

## 2017-10-30 NOTE — PROGRESS NOTE ADULT - SUBJECTIVE AND OBJECTIVE BOX
Patient is a 47y old  Male who presents with a chief complaint of "My leg wouldn't stop shaking." (14 Sep 2017 09:59)    doing ok  chest tube is out  still with some wheeze   Any change in ROS:     MEDICATIONS  (STANDING):  ALPRAZolam 0.25 milliGRAM(s) Oral every 8 hours  apixaban 5 milliGRAM(s) Oral every 12 hours  atorvastatin 80 milliGRAM(s) Oral at bedtime  buDESOnide 160 MICROgram(s)/formoterol 4.5 MICROgram(s) Inhaler 2 Puff(s) Inhalation every 12 hours  diVALproex  milliGRAM(s) Oral two times a day  docusate sodium 100 milliGRAM(s) Oral two times a day  ferrous    sulfate 325 milliGRAM(s) Oral every 8 hours  folic acid 1 milliGRAM(s) Oral daily  ipratropium    for Nebulization 500 MICROGram(s) Nebulizer every 6 hours  levalbuterol Inhalation 0.63 milliGRAM(s) Inhalation every 6 hours  midodrine 2.5 milliGRAM(s) Oral every 8 hours  pantoprazole    Tablet 40 milliGRAM(s) Oral two times a day before meals  polyethylene glycol 3350 17 Gram(s) Oral at bedtime  predniSONE   Tablet 40 milliGRAM(s) Oral daily  predniSONE   Tablet   Oral   voriconazole 200 milliGRAM(s) Oral every 12 hours    MEDICATIONS  (PRN):  acetaminophen    Suspension 650 milliGRAM(s) Oral every 6 hours PRN For Temp greater than 38 C (100.4 F)  acetaminophen   Tablet. 650 milliGRAM(s) Oral every 6 hours PRN Mild Pain (1 - 3)  aluminum hydroxide/magnesium hydroxide/simethicone Suspension 30 milliLiter(s) Oral every 6 hours PRN Dyspepsia  HYDROmorphone  Injectable 1 milliGRAM(s) IV Push every 4 hours PRN Severe Pain (7 - 10)    Vital Signs Last 24 Hrs  T(C): 37.2 (30 Oct 2017 08:38), Max: 37.2 (30 Oct 2017 08:38)  T(F): 98.9 (30 Oct 2017 08:38), Max: 98.9 (30 Oct 2017 08:38)  HR: 64 (30 Oct 2017 11:31) (64 - 87)  BP: 134/71 (30 Oct 2017 08:38) (121/75 - 139/88)  BP(mean): --  RR: 18 (30 Oct 2017 08:38) (17 - 18)  SpO2: 96% (30 Oct 2017 11:31) (96% - 100%)    I&O's Summary    29 Oct 2017 07:01  -  30 Oct 2017 07:00  --------------------------------------------------------  IN: 0 mL / OUT: 300 mL / NET: -300 mL          Physical Exam:   GENERAL: NAD, well-groomed, well-developed  HEENT: GIOVANI/   Atraumatic, Normocephalic  ENMT: No tonsillar erythema, exudates, or enlargement; Moist mucous membranes, Good dentition, No lesions  NECK: Supple, No JVD, Normal thyroid  CHEST/LUNG: mild wheezing   CVS: Regular rate and rhythm; No murmurs, rubs, or gallops  GI: : Soft, Nontender, Nondistended; Bowel sounds present  NERVOUS SYSTEM:  Alert & Oriented X3  EXTREMITIES:  2+ Peripheral Pulses, No clubbing, cyanosis, or edema  LYMPH: No lymphadenopathy noted  SKIN: No rashes or lesions  ENDOCRINOLOGY: No Thyromegaly  PSYCH: Appropriate    Labs:                              10.2   10.54 )-----------( 78       ( 30 Oct 2017 05:37 )             32.9                         10.4   9.53  )-----------( 67       ( 29 Oct 2017 05:43 )             33.7                         9.5    11.68 )-----------( 60       ( 28 Oct 2017 02:45 )             30.1                         10.7   16.11 )-----------( 61       ( 27 Oct 2017 06:10 )             33.4     10-30    142  |  99  |  22  ----------------------------<  104<H>  4.7   |  34<H>  |  0.73  10-29    144  |  100  |  23  ----------------------------<  120<H>  4.8   |  35<H>  |  0.92  10-28    143  |  101  |  23  ----------------------------<  128<H>  4.7   |  36<H>  |  0.91  10-27    142  |  100  |  21  ----------------------------<  132<H>  4.9   |  30  |  0.89    Ca    9.4      30 Oct 2017 05:37  Ca    9.3      29 Oct 2017 05:43    TPro  6.5  /  Alb  3.0<L>  /  TBili  0.2  /  DBili  x   /  AST  16  /  ALT  20  /  AlkPhos  68  10-29  TPro  6.1  /  Alb  2.9<L>  /  TBili  0.2  /  DBili  x   /  AST  21  /  ALT  22  /  AlkPhos  64  10-28  TPro  6.6  /  Alb  3.1<L>  /  TBili  0.2  /  DBili  x   /  AST  22  /  ALT  24  /  AlkPhos  69  10-27    CAPILLARY BLOOD GLUCOSE          LIVER FUNCTIONS - ( 29 Oct 2017 05:43 )  Alb: 3.0 g/dL / Pro: 6.5 g/dL / ALK PHOS: 68 u/L / ALT: 20 u/L / AST: 16 u/L / GGT: x               D-Dimer Assay, Quantitative: 738 ng/mL (10-30 @ 05:37)  Cultures:           < from: Xray Chest 1 View AP- PORTABLE-Urgent (10.29.17 @ 12:24) >  UE:   Portable chest    INTERPRETATION:     7:34 AM:  Left-sided pigtail catheter unchanged. Small postop left effusion   unchanged. No pneumothorax.     11:47 AM:  No interval change. Small postop left effusion with underlying   atelectasis. Pigtail catheter overlies the left upper hemithorax. No   pneumothorax appreciated.    Bilateral interstitial opacities consistent with diagnosis of sarcoidosis.      COMPARISON:  October 28      IMPRESSION:  Follow-up studies post left thoracotomy with chest tube in   place, no pneumothorax. Small effusion.                  RANDALL AMADOR M.D.; ATTENDING RADIOLOGIST  This document has been electronically signed. Oct 29 2017 12:22PM        < end of copied text >                    Studies  Chest X-RAY  CT SCAN Chest   Venous Dopplers: LE:   CT Abdomen  Others

## 2017-10-30 NOTE — PROGRESS NOTE ADULT - PROBLEM SELECTOR PLAN 6
10/7: start prednisone 20 mg per day  10/09: started back on IV steroids given his increased wheezing!!  10/10: wheezing better: decrease steroids to 20 mg q 8  10//: decrease and change prednisone to 40 mg daily: probably will be discharged on some prednisone  10/13: decrease prednisone to 30 mg tomorrow  10/15: on 30 mg of prednisone  10/16: on tapering steroids  10/17: stable: cont prednisone , will probably be dced on 20 mg of prednisone chan  10/17: stable   10/18: stable  10/19: cont steroids fro now  10/20: on 20 mg of prednisone for now  10/21: stable  10/22: decrease prednisone to 10 mg a day :he has not been wheezing today  10/24: stable  10/27? candidate of lung transplant? need to visit this issue once this acute condition is over!!  10/28: probably will end up on chronic steroids upon discharge!!  10/30: prednisone changed to tapering predniosne

## 2017-10-30 NOTE — PROGRESS NOTE ADULT - PROBLEM SELECTOR PLAN 1
s/p blood patch, but still with air leak: defer to thoracici surgery  10/22: increased on yesterdays chest -ray: failed blood patch.....next step? pleurodesis  10/24: s/p pleurodesis: air leaking:  10/25: in the afternoon he became acutely hypercarbic: transfered down to cticu: will need bipap help: plus also he is febrile? talc reaction: have a low threshold to starting antibiotcs  10/26: s/p pleurodesis: developed inflammatory response: currently on low dose vasopressors : defer to thoracic surgery  10/27: persistent hydropneumothorax left side: s/p pleurodesis  10/28: doing better: no air leak  10/29: no air leak: has clamped tube today : rpt chest x-ray  10/30: chest tube removed: follow chest radiograph

## 2017-10-31 LAB
HCT VFR BLD CALC: 32.4 % — LOW (ref 39–50)
HGB BLD-MCNC: 10.2 G/DL — LOW (ref 13–17)
MCHC RBC-ENTMCNC: 31.2 PG — SIGNIFICANT CHANGE UP (ref 27–34)
MCHC RBC-ENTMCNC: 31.5 % — LOW (ref 32–36)
MCV RBC AUTO: 99.1 FL — SIGNIFICANT CHANGE UP (ref 80–100)
NRBC # FLD: 0 — SIGNIFICANT CHANGE UP
PLATELET # BLD AUTO: 93 K/UL — LOW (ref 150–400)
PMV BLD: 12.8 FL — SIGNIFICANT CHANGE UP (ref 7–13)
RBC # BLD: 3.27 M/UL — LOW (ref 4.2–5.8)
RBC # FLD: 15.2 % — HIGH (ref 10.3–14.5)
WBC # BLD: 9.42 K/UL — SIGNIFICANT CHANGE UP (ref 3.8–10.5)
WBC # FLD AUTO: 9.42 K/UL — SIGNIFICANT CHANGE UP (ref 3.8–10.5)

## 2017-10-31 PROCEDURE — 71010: CPT | Mod: 26

## 2017-10-31 RX ORDER — LIDOCAINE 4 G/100G
1 CREAM TOPICAL DAILY
Qty: 0 | Refills: 0 | Status: DISCONTINUED | OUTPATIENT
Start: 2017-10-31 | End: 2017-11-02

## 2017-10-31 RX ADMIN — Medication 650 MILLIGRAM(S): at 17:10

## 2017-10-31 RX ADMIN — VORICONAZOLE 200 MILLIGRAM(S): 10 INJECTION, POWDER, LYOPHILIZED, FOR SOLUTION INTRAVENOUS at 18:48

## 2017-10-31 RX ADMIN — Medication 1 MILLIGRAM(S): at 13:24

## 2017-10-31 RX ADMIN — DIVALPROEX SODIUM 500 MILLIGRAM(S): 500 TABLET, DELAYED RELEASE ORAL at 05:08

## 2017-10-31 RX ADMIN — VORICONAZOLE 200 MILLIGRAM(S): 10 INJECTION, POWDER, LYOPHILIZED, FOR SOLUTION INTRAVENOUS at 05:08

## 2017-10-31 RX ADMIN — DIVALPROEX SODIUM 500 MILLIGRAM(S): 500 TABLET, DELAYED RELEASE ORAL at 18:48

## 2017-10-31 RX ADMIN — LEVALBUTEROL 0.63 MILLIGRAM(S): 1.25 SOLUTION, CONCENTRATE RESPIRATORY (INHALATION) at 04:21

## 2017-10-31 RX ADMIN — Medication 500 MICROGRAM(S): at 10:04

## 2017-10-31 RX ADMIN — BUDESONIDE AND FORMOTEROL FUMARATE DIHYDRATE 2 PUFF(S): 160; 4.5 AEROSOL RESPIRATORY (INHALATION) at 09:28

## 2017-10-31 RX ADMIN — Medication 500 MICROGRAM(S): at 23:04

## 2017-10-31 RX ADMIN — APIXABAN 5 MILLIGRAM(S): 2.5 TABLET, FILM COATED ORAL at 05:08

## 2017-10-31 RX ADMIN — BUDESONIDE AND FORMOTEROL FUMARATE DIHYDRATE 2 PUFF(S): 160; 4.5 AEROSOL RESPIRATORY (INHALATION) at 22:30

## 2017-10-31 RX ADMIN — Medication 500 MICROGRAM(S): at 04:21

## 2017-10-31 RX ADMIN — Medication 40 MILLIGRAM(S): at 05:08

## 2017-10-31 RX ADMIN — ATORVASTATIN CALCIUM 80 MILLIGRAM(S): 80 TABLET, FILM COATED ORAL at 22:26

## 2017-10-31 RX ADMIN — PANTOPRAZOLE SODIUM 40 MILLIGRAM(S): 20 TABLET, DELAYED RELEASE ORAL at 05:08

## 2017-10-31 RX ADMIN — Medication 325 MILLIGRAM(S): at 13:25

## 2017-10-31 RX ADMIN — PANTOPRAZOLE SODIUM 40 MILLIGRAM(S): 20 TABLET, DELAYED RELEASE ORAL at 16:31

## 2017-10-31 RX ADMIN — Medication 500 MICROGRAM(S): at 15:12

## 2017-10-31 RX ADMIN — Medication 325 MILLIGRAM(S): at 05:08

## 2017-10-31 RX ADMIN — LIDOCAINE 1 PATCH: 4 CREAM TOPICAL at 22:26

## 2017-10-31 RX ADMIN — Medication 325 MILLIGRAM(S): at 22:26

## 2017-10-31 RX ADMIN — LEVALBUTEROL 0.63 MILLIGRAM(S): 1.25 SOLUTION, CONCENTRATE RESPIRATORY (INHALATION) at 10:04

## 2017-10-31 RX ADMIN — APIXABAN 5 MILLIGRAM(S): 2.5 TABLET, FILM COATED ORAL at 18:48

## 2017-10-31 RX ADMIN — Medication 650 MILLIGRAM(S): at 16:31

## 2017-10-31 RX ADMIN — LEVALBUTEROL 0.63 MILLIGRAM(S): 1.25 SOLUTION, CONCENTRATE RESPIRATORY (INHALATION) at 23:04

## 2017-10-31 RX ADMIN — LEVALBUTEROL 0.63 MILLIGRAM(S): 1.25 SOLUTION, CONCENTRATE RESPIRATORY (INHALATION) at 15:12

## 2017-10-31 RX ADMIN — Medication 100 MILLIGRAM(S): at 18:48

## 2017-10-31 NOTE — PROGRESS NOTE ADULT - SUBJECTIVE AND OBJECTIVE BOX
Subjective: no acute complaints, "I'm ready for rehab"    Vital Signs:  Vital Signs Last 24 Hrs  T(C): 37.3 (10-31-17 @ 13:19), Max: 37.3 (10-31-17 @ 13:19)  T(F): 99.1 (10-31-17 @ 13:19), Max: 99.1 (10-31-17 @ 13:19)  HR: 70 (10-31-17 @ 13:19) (62 - 94)  BP: 125/80 (10-31-17 @ 13:19) (119/70 - 134/85)  RR: 18 (10-31-17 @ 13:19) (18 - 18)  SpO2: 100% (10-31-17 @ 13:19) (95% - 100%) on (O2)    Telemetry/Alarms:  General: WN/WD NAD  Neurology: Awake, nonfocal, BETH x 4  Eyes: Scleras clear, PERRLA/ EOMI, Gross vision intact  ENT:Gross hearing intact, grossly patent pharynx, no stridor  Neck: Neck supple, trachea midline, No JVD,   Respiratory: CTA B/L, No wheezing, rales, rhonchi  CV: RRR, S1S2, no murmurs, rubs or gallops  Abdominal: Soft, NT, ND +BS,   Extremities: No edema, + peripheral pulses  Skin: No Rashes, Hematoma, Ecchymosis  Lymphatic: No Neck, axilla, groin LAD  Psych: Oriented x 3, normal affect  Incisions: c,d,i    Relevant labs, radiology and Medications reviewed                        10.2   9.42  )-----------( 93       ( 31 Oct 2017 04:50 )             32.4     10-30    142  |  99  |  22  ----------------------------<  104<H>  4.7   |  34<H>  |  0.73    Ca    9.4      30 Oct 2017 05:37        MEDICATIONS  (STANDING):  ALPRAZolam 0.25 milliGRAM(s) Oral every 8 hours  apixaban 5 milliGRAM(s) Oral every 12 hours  atorvastatin 80 milliGRAM(s) Oral at bedtime  buDESOnide 160 MICROgram(s)/formoterol 4.5 MICROgram(s) Inhaler 2 Puff(s) Inhalation every 12 hours  diVALproex  milliGRAM(s) Oral two times a day  docusate sodium 100 milliGRAM(s) Oral two times a day  ferrous    sulfate 325 milliGRAM(s) Oral every 8 hours  folic acid 1 milliGRAM(s) Oral daily  ipratropium    for Nebulization 500 MICROGram(s) Nebulizer every 6 hours  levalbuterol Inhalation 0.63 milliGRAM(s) Inhalation every 6 hours  pantoprazole    Tablet 40 milliGRAM(s) Oral two times a day before meals  polyethylene glycol 3350 17 Gram(s) Oral at bedtime  predniSONE   Tablet 40 milliGRAM(s) Oral daily  predniSONE   Tablet   Oral   voriconazole 200 milliGRAM(s) Oral every 12 hours    MEDICATIONS  (PRN):  acetaminophen    Suspension 650 milliGRAM(s) Oral every 6 hours PRN For Temp greater than 38 C (100.4 F)  acetaminophen   Tablet. 650 milliGRAM(s) Oral every 6 hours PRN Mild Pain (1 - 3)  aluminum hydroxide/magnesium hydroxide/simethicone Suspension 30 milliLiter(s) Oral every 6 hours PRN Dyspepsia    Pertinent Physical Exam  I&O's Summary    30 Oct 2017 07:01  -  31 Oct 2017 07:00  --------------------------------------------------------  IN: 0 mL / OUT: 700 mL / NET: -700 mL    31 Oct 2017 07:01  -  31 Oct 2017 15:01  --------------------------------------------------------  IN: 0 mL / OUT: 300 mL / NET: -300 mL        Assessment  47y Male  w/ PAST MEDICAL & SURGICAL HISTORY:  History of pneumothorax: History of 3 prior pneumonthoracies.  COPD (chronic obstructive pulmonary disease)  Asthma  Hypertension  Sarcoidosis  No significant past surgical history  admitted with complaints of Patient is a 47y old  Male who presents with a chief complaint of "My leg wouldn't stop shaking." (14 Sep 2017 09:59)  .  On (Date), patient underwent Thoracotomy  Central venous catheter placement with ultrasound guidance  Thoracoscopic pneumolysis  Thoracotomy, with lung lobectomy  Bronchoscopy with bronchoalveolar lavage of both sides  . Postoperative course/issues:   46yo Male w h/o sarcoid, afib. Admitted with new onset CVA. Developed hemoptysis.  PAF- AC on hold due to hemoptysis; 9/21-FB/BAL done by Dr. Arias. Intraop: Bleeding from DAVID (non-lingular)-Recom IR to embolize; 9/22 CT angio done per IR Lobko request.  Formal PFTS and ask Card to fix PFO now to decrease blood flow; 9/24- Developed worsening hemoptysis. Emergently kristel to OR and had  Left thoractomy DAVID. Brought to CTICU. Pt on thoracic service now 9/24: 1400ml EBL in OR; 10 u PRBCs;9/25-Brought back to OR for bleeding. Had Reop Thoracotomy, evac left hemothorax. CTI- DAVID space despite -40 Sxn, MEYER, L Hemiparesis (CVA) better w walking; F/u ID- Voriconazole to 11/4/17; Plan acute rehab 10/6 IR L PTC for PTX; 10/13 WS today if CXR ok - incr PTX -> back to suction at all times; 10/14: sutured leaking old c-tube site; 10/15 CXR small PTX no change 10/16 WS trial failed-> back to sxn; DC planning rehab eventually; 10/18 failed WS; blood patch 10/19-> on sxn, CXR stable but still AL; 10/21 C-tube to WS & pt got SOB & needed O2, back on SXN & Air leak is back. LLE swelling, Dopplers [id number] Bedside pleurodesis tolerated gymnastics for 1hr + 50min. RC=376; No wsxn 48-72 h; AL resolved; Merishi - check PLT -> stable at 79; Tachy & hypoTN - 1 L NS, albumin, sob- lasix 20 IVx1. Developed SIRS after pleurodesis-> CTICU; On pressors. Pt. clinically improved in CTICU. Maintained on Steroids, weaned down to Solumedrol BID. Off pressors and transitioned to Midodrine. Still with thrombocytopenia but no bleeding. Continued on Eliquis but no ASA for h/o CVA. Neuro deficits improving. Cont antifungals. Air leak resolved on 10/27. CXR- on WS- lung expanded, No AL still. 10/28-Transferred back to [facility]. 10/29- Left PTC clamped, FU CXR no ptx      PLAN  Neuro: Pain management  Pulm: Encourage coughing, deep breathing and use of incentive spirometry. Wean off supplemental oxygen as able. Daily CXR.   Cardio: Monitor telemetry/alarms  GI: Tolerating diet. Continue stool softeners.  Renal: monitor urine output, supplement electrolytes as needed  Vasc: Heparin SC/SCDs for DVT prophylaxis  Heme: Stable H/H. .   ID: Off antibiotics. Stable.  Therapy: OOB/ambulate    Disposition: Aim to D/C to rehab once a bed becomes available   Discussed with Cardiothoracic Team at AM rounds.

## 2017-10-31 NOTE — PROGRESS NOTE ADULT - PROBLEM SELECTOR PLAN 1
s/p blood patch, but still with air leak: defer to thoracici surgery  10/22: increased on yesterdays chest -ray: failed blood patch.....next step? pleurodesis  10/24: s/p pleurodesis: air leaking:  10/25: in the afternoon he became acutely hypercarbic: transfered down to cticu: will need bipap help: plus also he is febrile? talc reaction: have a low threshold to starting antibiotcs  10/26: s/p pleurodesis: developed inflammatory response: currently on low dose vasopressors : defer to thoracic surgery  10/27: persistent hydropneumothorax left side: s/p pleurodesis  10/28: doing better: no air leak  10/29: no air leak: has clamped tube today : rpt chest x-ray  10/30: chest tube removed: follow chest radiograph  10/31: doing ok : no sob : DID GREAT: WITH NO PTX ON RECENT CHEST X-RAY

## 2017-10-31 NOTE — PROGRESS NOTE ADULT - PROBLEM SELECTOR PLAN 2
pt has been having slowly decreasing platelets: he is on eliquis too: no bleeding. defer to primary team  10/22: please recheck his platelets  10/24: 81: stable: no bleeding  10/25: will be followed in cticu  10/26: cont to get worse: would suggest to get HEMONC  10/27: better slightly today: defer to cticu  10/28: 60: monitor for bleeding  10/28: ? hemonc  10/30: better today  10/31: SEEMS TO BE GETTING BETTER

## 2017-10-31 NOTE — PROGRESS NOTE ADULT - PROBLEM SELECTOR PLAN 4
1/10was on 40 mg three times a day: dw icu attending , to decrease it to 20 q 8 hours  11/11: would change to po prednisone 40 mg daily  10/12: on steroids for now with slow taper  10/13: decrease prednisone to 30 mg tomorrow  10/14: prednisone 30 md every day : and then cut down to 20 mg  10/15: cont prednisone  10/16: on tapering steroids  10/17: no wheezing pt/ot  10/19: mild wheezing today : start duoneb ATC  10/20: give duoneb q 6 hours ATC  10/21: still with off and on wheezing: cont 20 mg of steroids  10/22: decrease steroids to 10 mg a day  10/25: started on high dose steroids by cticu  10/26: pt was wheezing yesterday as well as was acutely hypercarbic: the steroids were increased and switched to IV: Decrease IV steroids to q 8 hours  10/17: it is very difficult to taper steroids on  him as when they are decreased, he starts wheezing again  10/28: decrease solumedrol to 20 mg twice a day !!  10/29: tomorrow switch to po predniosne 40 mg per day, he should be dced on steroids now  10/30: switched to po prednisone : taper slowly  10/31: CONT SYMBICORT, AND ADD SPIRIVA ON DC WITH ALBUTEROL Q 6 HOURS PRN FOR SOB AS WEILL AS SLOW TAPER OF STEROIDS

## 2017-10-31 NOTE — PROGRESS NOTE ADULT - PROBLEM SELECTOR PLAN 6
10/7: start prednisone 20 mg per day  10/09: started back on IV steroids given his increased wheezing!!  10/10: wheezing better: decrease steroids to 20 mg q 8  10//: decrease and change prednisone to 40 mg daily: probably will be discharged on some prednisone  10/13: decrease prednisone to 30 mg tomorrow  10/15: on 30 mg of prednisone  10/16: on tapering steroids  10/17: stable: cont prednisone , will probably be dced on 20 mg of prednisone chan  10/17: stable   10/18: stable  10/19: cont steroids fro now  10/20: on 20 mg of prednisone for now  10/21: stable  10/22: decrease prednisone to 10 mg a day :he has not been wheezing today  10/24: stable  10/27? candidate of lung transplant? need to visit this issue once this acute condition is over!!  10/28: probably will end up on chronic steroids upon discharge!!  10/30: prednisone changed to tapering predniosne  10/31: SLOW TAPER OF STEROIDS

## 2017-10-31 NOTE — PROGRESS NOTE ADULT - SUBJECTIVE AND OBJECTIVE BOX
Patient is a 47y old  Male who presents with a chief complaint of "My leg wouldn't stop shaking." (14 Sep 2017 09:59)    pt is doing okk     no OSB   chest tube is out   Any change in ROS:     MEDICATIONS  (STANDING):  ALPRAZolam 0.25 milliGRAM(s) Oral every 8 hours  apixaban 5 milliGRAM(s) Oral every 12 hours  atorvastatin 80 milliGRAM(s) Oral at bedtime  buDESOnide 160 MICROgram(s)/formoterol 4.5 MICROgram(s) Inhaler 2 Puff(s) Inhalation every 12 hours  diVALproex  milliGRAM(s) Oral two times a day  docusate sodium 100 milliGRAM(s) Oral two times a day  ferrous    sulfate 325 milliGRAM(s) Oral every 8 hours  folic acid 1 milliGRAM(s) Oral daily  ipratropium    for Nebulization 500 MICROGram(s) Nebulizer every 6 hours  levalbuterol Inhalation 0.63 milliGRAM(s) Inhalation every 6 hours  pantoprazole    Tablet 40 milliGRAM(s) Oral two times a day before meals  polyethylene glycol 3350 17 Gram(s) Oral at bedtime  predniSONE   Tablet 40 milliGRAM(s) Oral daily  predniSONE   Tablet   Oral   voriconazole 200 milliGRAM(s) Oral every 12 hours    MEDICATIONS  (PRN):  acetaminophen    Suspension 650 milliGRAM(s) Oral every 6 hours PRN For Temp greater than 38 C (100.4 F)  acetaminophen   Tablet. 650 milliGRAM(s) Oral every 6 hours PRN Mild Pain (1 - 3)  aluminum hydroxide/magnesium hydroxide/simethicone Suspension 30 milliLiter(s) Oral every 6 hours PRN Dyspepsia    Vital Signs Last 24 Hrs  T(C): 36.9 (31 Oct 2017 09:24), Max: 37.1 (31 Oct 2017 05:02)  T(F): 98.5 (31 Oct 2017 09:24), Max: 98.8 (31 Oct 2017 05:02)  HR: 64 (31 Oct 2017 10:05) (62 - 94)  BP: 119/70 (31 Oct 2017 09:24) (119/70 - 134/85)  BP(mean): --  RR: 18 (31 Oct 2017 09:24) (18 - 18)  SpO2: 95% (31 Oct 2017 10:05) (95% - 100%)    I&O's Summary    30 Oct 2017 07:01  -  31 Oct 2017 07:00  --------------------------------------------------------  IN: 0 mL / OUT: 700 mL / NET: -700 mL          Physical Exam:   GENERAL: NAD, well-groomed, well-developed  HEENT: GIOVANI/   Atraumatic, Normocephalic  ENMT: No tonsillar erythema, exudates, or enlargement; Moist mucous membranes, Good dentition, No lesions  NECK: Supple, No JVD, Normal thyroid  CHEST/LUNG: some coarse rhonchi   CVS: Regular rate and rhythm; No murmurs, rubs, or gallops  GI: : Soft, Nontender, Nondistended; Bowel sounds present  NERVOUS SYSTEM:  Alert & Oriented X3  EXTREMITIES:  2+ Peripheral Pulses, No clubbing, cyanosis, or edema  LYMPH: No lymphadenopathy noted  SKIN: No rashes or lesions  ENDOCRINOLOGY: No Thyromegaly  PSYCH: Appropriate    Labs:                              10.2   9.42  )-----------( 93       ( 31 Oct 2017 04:50 )             32.4                         10.2   10.54 )-----------( 78       ( 30 Oct 2017 05:37 )             32.9                         10.4   9.53  )-----------( 67       ( 29 Oct 2017 05:43 )             33.7                         9.5    11.68 )-----------( 60       ( 28 Oct 2017 02:45 )             30.1     10-30    142  |  99  |  22  ----------------------------<  104<H>  4.7   |  34<H>  |  0.73  10-29    144  |  100  |  23  ----------------------------<  120<H>  4.8   |  35<H>  |  0.92  10-28    143  |  101  |  23  ----------------------------<  128<H>  4.7   |  36<H>  |  0.91    Ca    9.4      30 Oct 2017 05:37    TPro  6.5  /  Alb  3.0<L>  /  TBili  0.2  /  DBili  x   /  AST  16  /  ALT  20  /  AlkPhos  68  10-29  TPro  6.1  /  Alb  2.9<L>  /  TBili  0.2  /  DBili  x   /  AST  21  /  ALT  22  /  AlkPhos  64  10-28    CAPILLARY BLOOD GLUCOSE                D-Dimer Assay, Quantitative: 738 ng/mL (10-30 @ 05:37)  Cultures:         < from: Xray Chest 1 View AP -PORTABLE-Routine (10.31.17 @ 08:02) >  pleural fluid is again seen.  No pneumothorax seen.              IMPRESSION:  Left lung postsurgical change again noted.    No pneumothorax seen.    Unchanged bilateral reticular opacities consistent with history of sarcoid    Trace right and small left pleural effusion not significantly changed.   Left apical opacity, possibly an apical cap of pleural fluid is again   seen.                  KEERTHI FIELDS M.D., ATTENDING RADIOLOGIST  This document has been electronically signed. Oct 31 2017 10:06AM    < end of copied text >                      Studies  Chest X-RAY  CT SCAN Chest   Venous Dopplers: LE:   CT Abdomen  Others

## 2017-11-01 LAB
ACID FAST STN SPT: SIGNIFICANT CHANGE UP
BUN SERPL-MCNC: 21 MG/DL — SIGNIFICANT CHANGE UP (ref 7–23)
CALCIUM SERPL-MCNC: 9.1 MG/DL — SIGNIFICANT CHANGE UP (ref 8.4–10.5)
CHLORIDE SERPL-SCNC: 98 MMOL/L — SIGNIFICANT CHANGE UP (ref 98–107)
CO2 SERPL-SCNC: 36 MMOL/L — HIGH (ref 22–31)
CREAT SERPL-MCNC: 0.71 MG/DL — SIGNIFICANT CHANGE UP (ref 0.5–1.3)
GLUCOSE SERPL-MCNC: 78 MG/DL — SIGNIFICANT CHANGE UP (ref 70–99)
HCT VFR BLD CALC: 32.4 % — LOW (ref 39–50)
HGB BLD-MCNC: 10.3 G/DL — LOW (ref 13–17)
MCHC RBC-ENTMCNC: 31.1 PG — SIGNIFICANT CHANGE UP (ref 27–34)
MCHC RBC-ENTMCNC: 31.8 % — LOW (ref 32–36)
MCV RBC AUTO: 97.9 FL — SIGNIFICANT CHANGE UP (ref 80–100)
NRBC # FLD: 0 — SIGNIFICANT CHANGE UP
PLATELET # BLD AUTO: 124 K/UL — LOW (ref 150–400)
PMV BLD: 13 FL — SIGNIFICANT CHANGE UP (ref 7–13)
POTASSIUM SERPL-MCNC: 4 MMOL/L — SIGNIFICANT CHANGE UP (ref 3.5–5.3)
POTASSIUM SERPL-SCNC: 4 MMOL/L — SIGNIFICANT CHANGE UP (ref 3.5–5.3)
RBC # BLD: 3.31 M/UL — LOW (ref 4.2–5.8)
RBC # FLD: 15.3 % — HIGH (ref 10.3–14.5)
SODIUM SERPL-SCNC: 143 MMOL/L — SIGNIFICANT CHANGE UP (ref 135–145)
WBC # BLD: 6.64 K/UL — SIGNIFICANT CHANGE UP (ref 3.8–10.5)
WBC # FLD AUTO: 6.64 K/UL — SIGNIFICANT CHANGE UP (ref 3.8–10.5)

## 2017-11-01 PROCEDURE — 99232 SBSQ HOSP IP/OBS MODERATE 35: CPT | Mod: GC

## 2017-11-01 PROCEDURE — 71010: CPT | Mod: 26

## 2017-11-01 RX ORDER — OXYCODONE HYDROCHLORIDE 5 MG/1
10 TABLET ORAL EVERY 4 HOURS
Qty: 0 | Refills: 0 | Status: DISCONTINUED | OUTPATIENT
Start: 2017-11-01 | End: 2017-11-02

## 2017-11-01 RX ORDER — OXYCODONE HYDROCHLORIDE 5 MG/1
5 TABLET ORAL EVERY 4 HOURS
Qty: 0 | Refills: 0 | Status: DISCONTINUED | OUTPATIENT
Start: 2017-11-01 | End: 2017-11-02

## 2017-11-01 RX ADMIN — Medication 325 MILLIGRAM(S): at 21:56

## 2017-11-01 RX ADMIN — Medication 500 MICROGRAM(S): at 16:29

## 2017-11-01 RX ADMIN — LEVALBUTEROL 0.63 MILLIGRAM(S): 1.25 SOLUTION, CONCENTRATE RESPIRATORY (INHALATION) at 11:17

## 2017-11-01 RX ADMIN — LEVALBUTEROL 0.63 MILLIGRAM(S): 1.25 SOLUTION, CONCENTRATE RESPIRATORY (INHALATION) at 16:30

## 2017-11-01 RX ADMIN — Medication 40 MILLIGRAM(S): at 05:56

## 2017-11-01 RX ADMIN — Medication 325 MILLIGRAM(S): at 05:55

## 2017-11-01 RX ADMIN — LEVALBUTEROL 0.63 MILLIGRAM(S): 1.25 SOLUTION, CONCENTRATE RESPIRATORY (INHALATION) at 20:26

## 2017-11-01 RX ADMIN — VORICONAZOLE 200 MILLIGRAM(S): 10 INJECTION, POWDER, LYOPHILIZED, FOR SOLUTION INTRAVENOUS at 05:55

## 2017-11-01 RX ADMIN — DIVALPROEX SODIUM 500 MILLIGRAM(S): 500 TABLET, DELAYED RELEASE ORAL at 18:37

## 2017-11-01 RX ADMIN — Medication 500 MICROGRAM(S): at 11:18

## 2017-11-01 RX ADMIN — Medication 650 MILLIGRAM(S): at 03:09

## 2017-11-01 RX ADMIN — VORICONAZOLE 200 MILLIGRAM(S): 10 INJECTION, POWDER, LYOPHILIZED, FOR SOLUTION INTRAVENOUS at 18:37

## 2017-11-01 RX ADMIN — APIXABAN 5 MILLIGRAM(S): 2.5 TABLET, FILM COATED ORAL at 05:55

## 2017-11-01 RX ADMIN — Medication 325 MILLIGRAM(S): at 17:01

## 2017-11-01 RX ADMIN — PANTOPRAZOLE SODIUM 40 MILLIGRAM(S): 20 TABLET, DELAYED RELEASE ORAL at 18:37

## 2017-11-01 RX ADMIN — OXYCODONE HYDROCHLORIDE 5 MILLIGRAM(S): 5 TABLET ORAL at 08:45

## 2017-11-01 RX ADMIN — DIVALPROEX SODIUM 500 MILLIGRAM(S): 500 TABLET, DELAYED RELEASE ORAL at 05:55

## 2017-11-01 RX ADMIN — OXYCODONE HYDROCHLORIDE 5 MILLIGRAM(S): 5 TABLET ORAL at 08:44

## 2017-11-01 RX ADMIN — Medication 500 MICROGRAM(S): at 20:26

## 2017-11-01 RX ADMIN — Medication 1 MILLIGRAM(S): at 12:40

## 2017-11-01 RX ADMIN — BUDESONIDE AND FORMOTEROL FUMARATE DIHYDRATE 2 PUFF(S): 160; 4.5 AEROSOL RESPIRATORY (INHALATION) at 08:45

## 2017-11-01 RX ADMIN — Medication 500 MICROGRAM(S): at 04:15

## 2017-11-01 RX ADMIN — LIDOCAINE 1 PATCH: 4 CREAM TOPICAL at 12:40

## 2017-11-01 RX ADMIN — Medication 30 MILLIGRAM(S): at 18:37

## 2017-11-01 RX ADMIN — LIDOCAINE 1 PATCH: 4 CREAM TOPICAL at 23:58

## 2017-11-01 RX ADMIN — LEVALBUTEROL 0.63 MILLIGRAM(S): 1.25 SOLUTION, CONCENTRATE RESPIRATORY (INHALATION) at 04:15

## 2017-11-01 RX ADMIN — Medication 650 MILLIGRAM(S): at 04:09

## 2017-11-01 RX ADMIN — PANTOPRAZOLE SODIUM 40 MILLIGRAM(S): 20 TABLET, DELAYED RELEASE ORAL at 05:55

## 2017-11-01 RX ADMIN — ATORVASTATIN CALCIUM 80 MILLIGRAM(S): 80 TABLET, FILM COATED ORAL at 21:56

## 2017-11-01 RX ADMIN — BUDESONIDE AND FORMOTEROL FUMARATE DIHYDRATE 2 PUFF(S): 160; 4.5 AEROSOL RESPIRATORY (INHALATION) at 20:45

## 2017-11-01 RX ADMIN — LIDOCAINE 1 PATCH: 4 CREAM TOPICAL at 10:01

## 2017-11-01 RX ADMIN — APIXABAN 5 MILLIGRAM(S): 2.5 TABLET, FILM COATED ORAL at 18:37

## 2017-11-01 NOTE — PROGRESS NOTE ADULT - PROBLEM SELECTOR PLAN 1
s/p blood patch, but still with air leak: defer to thoracici surgery  10/22: increased on yesterdays chest -ray: failed blood patch.....next step? pleurodesis  10/24: s/p pleurodesis: air leaking:  10/25: in the afternoon he became acutely hypercarbic: transfered down to cticu: will need bipap help: plus also he is febrile? talc reaction: have a low threshold to starting antibiotcs  10/26: s/p pleurodesis: developed inflammatory response: currently on low dose vasopressors : defer to thoracic surgery  10/27: persistent hydropneumothorax left side: s/p pleurodesis  10/28: doing better: no air leak  10/29: no air leak: has clamped tube today : rpt chest x-ray  10/30: chest tube removed: follow chest radiograph  10/31: doing ok : no sob : DID GREAT: WITH NO PTX ON RECENT CHEST X-RAY  11/1: resolved: apical cap of fluid

## 2017-11-01 NOTE — PROGRESS NOTE ADULT - NSHPATTENDINGPLANDISCUSS_GEN_ALL_CORE
Attending in CTICU
Attending in CTICU
patient family and MICU team.
patient, family at bedside and MICU team.
Attending in CTICU
Neurology housestaff and primary medical team
neurology resident Dr. Jeffery
neurology resident Dr. Jeffery
CTsurgery/Pt/cardiology
Attending in CTICU
tele PA/Pt/Pulmonary/family
CTICU attending
Tele PA/Neuro
Tele PA/pt/family/Neuro
Pulm/Tele PA/Pt

## 2017-11-01 NOTE — PROGRESS NOTE ADULT - SUBJECTIVE AND OBJECTIVE BOX
Subjective: "Im anxious to get to rehab. I still have pain in my side sometimes" OOB w assist. SOB w exertion improved.     Vital Signs:  Vital Signs Last 24 Hrs  T(C): 36.9 (11-01-17 @ 12:00), Max: 37.3 (10-31-17 @ 19:34)  T(F): 98.5 (11-01-17 @ 12:00), Max: 99.1 (10-31-17 @ 19:34)  HR: 91 (11-01-17 @ 16:30) (73 - 95)  BP: 127/77 (11-01-17 @ 12:00) (125/81 - 135/85)  RR: 18 (11-01-17 @ 12:00) (18 - 18)  SpO2: 97% (11-01-17 @ 16:30) (94% - 100%) on (O2)    Telemetry/Alarms:  General: WN/WD NAD  Neurology: Awake, nonfocal, BETH x 4, left UE and LE weakness stable  Eyes: Scleras clear, PERRLA/ EOMI, Gross vision intact  ENT:Gross hearing intact, grossly patent pharynx, no stridor  Neck: Neck supple, trachea midline, No JVD,   Respiratory: decreased throughout left, No wheezing, rales, rhonchi  CV: RRR, S1S2, no murmurs, rubs or gallops  Abdominal: Soft, NT, ND +BS, +bm  Extremities:trace LE edema, + peripheral pulses  Skin: No Rashes, Hematoma, Ecchymosis  Lymphatic: No Neck, axilla, groin LAD  Psych: Oriented x 3, normal affect  Incisions: VATS/thoracotomy site c/di    Relevant labs, radiology and Medications reviewed  CXR- as per radiology no definitive pleural line or ptx but left apical area suspicious for possible loculated ptx due to increased lucency seen over past few CXRs. Pt. clinically unchanged. D/w Dr. Caldera. NO intervention required. Just observe and rpt CXR in am.                         10.3   6.64  )-----------( 124      ( 01 Nov 2017 05:40 )             32.4     11-01    143  |  98  |  21  ----------------------------<  78  4.0   |  36<H>  |  0.71    Ca    9.1      01 Nov 2017 05:40        MEDICATIONS  (STANDING):  ALPRAZolam 0.25 milliGRAM(s) Oral every 8 hours  apixaban 5 milliGRAM(s) Oral every 12 hours  atorvastatin 80 milliGRAM(s) Oral at bedtime  buDESOnide 160 MICROgram(s)/formoterol 4.5 MICROgram(s) Inhaler 2 Puff(s) Inhalation every 12 hours  diVALproex  milliGRAM(s) Oral two times a day  docusate sodium 100 milliGRAM(s) Oral two times a day  ferrous    sulfate 325 milliGRAM(s) Oral every 8 hours  folic acid 1 milliGRAM(s) Oral daily  ipratropium    for Nebulization 500 MICROGram(s) Nebulizer every 6 hours  levalbuterol Inhalation 0.63 milliGRAM(s) Inhalation every 6 hours  lidocaine   Patch 1 Patch Transdermal daily  pantoprazole    Tablet 40 milliGRAM(s) Oral two times a day before meals  polyethylene glycol 3350 17 Gram(s) Oral at bedtime  predniSONE   Tablet   Oral   predniSONE   Tablet 30 milliGRAM(s) Oral daily  voriconazole 200 milliGRAM(s) Oral every 12 hours    MEDICATIONS  (PRN):  acetaminophen    Suspension 650 milliGRAM(s) Oral every 6 hours PRN For Temp greater than 38 C (100.4 F)  acetaminophen   Tablet. 650 milliGRAM(s) Oral every 6 hours PRN Mild Pain (1 - 3)  aluminum hydroxide/magnesium hydroxide/simethicone Suspension 30 milliLiter(s) Oral every 6 hours PRN Dyspepsia  oxyCODONE    IR 5 milliGRAM(s) Oral every 4 hours PRN Moderate Pain (4 - 6)  oxyCODONE    IR 10 milliGRAM(s) Oral every 4 hours PRN Severe Pain (7 - 10)    Pertinent Physical Exam  I&O's Summary    31 Oct 2017 07:01  -  01 Nov 2017 07:00  --------------------------------------------------------  IN: 0 mL / OUT: 450 mL / NET: -450 mL        Assessment  47y Male  w/ PAST MEDICAL & SURGICAL HISTORY:  History of pneumothorax: History of 3 prior pneumonthoracies.  COPD (chronic obstructive pulmonary disease)  Asthma  Hypertension  Sarcoidosis  No significant past surgical history  admitted with complaints of Patient is a 47y old  Male who presents with a chief complaint of "My leg wouldn't stop shaking." (14 Sep 2017 09:59)  46yo Male w h/o sarcoid, afib. Admitted with new onset CVA. Developed hemoptysis.  PAF- AC on hold due to hemoptysis; 9/21-FB/BAL done by Dr. Arias. Intraop: Bleeding from DAVID (non-lingular)-Recom IR to embolize; 9/22 CT angio done per IR [place] request.  Formal PFTS and ask Card to fix PFO now to decrease blood flow; 9/24- Developed worsening hemoptysis. Emergently kristel to OR and had  Left thoractomy DAVID. Brought to CTICU. Pt on thoracic service now 9/24: 1400ml EBL in OR; 10 u PRBCs;9/25-Brought back to OR for bleeding. Had Reop Thoracotomy, evac left hemothorax. CTI- DAVID space despite -40 Sxn, MEYER, L Hemiparesis (CVA) better w walking; F/u ID- Voriconazole to 11/4/17; Plan acute rehab 10/6 IR L PTC for PTX; 10/13 WS today if CXR ok - incr PTX -> back to suction at all times; 10/14: sutured leaking old c-tube site; 10/15 CXR small PTX no change 10/16 WS trial failed-> back to sxn; DC planning rehab eventually; 10/18 failed WS; blood patch 10/19-> on sxn, CXR stable but still AL; 10/21 C-tube to WS & pt got SOB & needed O2, back on SXN & Air leak is back. LLE swelling, Dopplers [id number] Bedside pleurodesis tolerated gymnastics for 1hr + 50min. OH=020; No wsxn 48-72 h; AL resolved; Merishi - check PLT -> stable at 79; Tachy & hypoTN - 1 L NS, albumin, sob- lasix 20 IVx1. Developed SIRS after pleurodesis-> CTICU; On pressors. Pt. clinically improved in CTICU. Maintained on Steroids, weaned down to Solumedrol BID. Off pressors and transitioned to Midodrine. Still with thrombocytopenia but no bleeding. Continued on Eliquis but no ASA for h/o CVA. Neuro deficits improving. Cont antifungals. Air leak resolved on 10/27. CXR- on WS- lung expanded, No AL still. 10/28-Transferred back to [facility]. 10/29- Left PTC clamped, FU CXR no ptx 10/30-PTC removed, CXR no ptx. Awaiting authorization for rehab.     PLAN  Neuro: Pain management  Pulm: Encourage coughing, deep breathing and use of incentive spirometry. Wean off supplemental oxygen as able. Daily CXR, will monitor lucency on CXR.   Cardio: Monitor telemetry/alarms. Cont Eliquis. FU card as outpt for PFO closure.   GI: Tolerating diet. Continue stool softeners.  Renal: monitor urine output, supplement electrolytes as needed  Vasc: Heparin SC/SCDs for DVT prophylaxis  Heme: Stable H/H. .   ID: Off antibiotics. Stable.  Therapy: OOB/ambulate    Disposition: Aim to D/C to acute Rehab asap.   Discussed with Cardiothoracic Team at AM rounds.

## 2017-11-01 NOTE — PROGRESS NOTE ADULT - ATTENDING COMMENTS
Stress test performed, no ischemia. No further cardiac w/u.
Agree with above. Seen and examined with residents. 47 year old with sarcoidosis. Now with seizures and post-ictal state. For MRI today and follow up on results. Neurology following.
Agree with above. Seen and examined with resident. Multiple regions of stroke. Unclear etiology. Neurology following. Bedside ultrasound showed positive bubble study with flow across the intraatrial septum. Officials echo ordered. Will call [facility] pulmonary team to follow up on the extent of sarcoid treatment in the past.
Patient seen and examined and events from overnight reviewed and above note reviewed and I agree with assessment and plan as outlined. Patient became unresponsive and appeared to have seizure and was loaded with depakote and subsequently intubated and transferred to MICU. He is now on 24 hour VEEG and we will obtain an MRI brain once more stable. Continue supportive care and MICU care and follow up depakote levels in morning.
Patient seen and examined with neurology team and above note reviewed and I agree with assessment and plan as outlined. Patient now extubated and more alert and responsive however poor left lateral gaze and left sided plegia. I reviewed his EEG and while no seizures there is a burst suppression pattern on EEG and his MRI brain shows acute infarcts in the MCA and PCA distribution consistent with watershed type infarcts. Would obtain ESR< CRP and JONATAN to look for endocarditis and long term holter monitoring to assess for atrial fib.   Continue depakote as outlined and continuous EEG monitoring and MICU and supportive care.
Please call [phone] for any questions or concerns
Please call [phone] for any questions or concerns
agree with above; ROS otherwise negative
agree with above; ROS otherwise negative
stil with pneumothorax following stopping off suctioning? for blood patch tomorrow?  10/19: s/p bloodpatch: defer to ct surgery  10/21: still wi th air leak  10/25: IN CTICU NOW: S/P PLEURODESIS WITH TALC YESTERDAY , DEVELOPED FEVER FOLLOWING THAT? TALC REACTION?? HAVE ALOW THRESHOLD OF STARTING ANTIBIOTICS AS WELL AS DECREASE STEROIDS TOMORROW
Follow closely for repeat hemoptysis  would try to obtain recent PFTs from [place] 1 month ago
stil with pneumothorax following stopping off suctioning? for blood patch tomorrow?
stil with pneumothorax following stopping off suctioning? for blood patch tomorrow?  10/19: s/p blood patch: defer to ct surgery  10/21: still wi th air leak  10/25: IN CTICU NOW: S/P PLEURODESIS WITH TALC YESTERDAY , DEVELOPED FEVER FOLLOWING THAT? TALC REACTION?? HAVE ALOW THRESHOLD OF STARTING ANTIBIOTICS AS WELL AS DECREASE STEROIDS TOMORROW  10/26: steroids decreased  10/28: discussed with NP  11/01: slow taper of steroids
stil with pneumothorax following stopping off suctioning? for blood patch tomorrow?  10/19: s/p bloodpatch: defer to ct surgery
stil with pneumothorax following stopping off suctioning? for blood patch tomorrow?  10/19: s/p bloodpatch: defer to ct surgery
stil with pneumothorax following stopping off suctioning? for blood patch tomorrow?  10/19: s/p bloodpatch: defer to ct surgery  10/21: still wi th air leak  10/25: IN CTICU NOW: S/P PLEURODESIS WITH TALC YESTERDAY , DEVELOPED FEVER FOLLOWING THAT? TALC REACTION?? HAVE ALOW THRESHOLD OF STARTING ANTIBIOTICS AS WELL AS DECREASE STEROIDS TOMORROW  10/26: steroids decreased
stil with pneumothorax following stopping off suctioning? for blood patch tomorrow?  10/19: s/p bloodpatch: defer to ct surgery  10/21: still wi th air leak  10/25: IN CTICU NOW: S/P PLEURODESIS WITH TALC YESTERDAY , DEVELOPED FEVER FOLLOWING THAT? TALC REACTION?? HAVE ALOW THRESHOLD OF STARTING ANTIBIOTICS AS WELL AS DECREASE STEROIDS TOMORROW  10/26: steroids decreased  10/28: discussed w dennis NP
stil with pneumothorax following stopping off suctioning? for blood patch tomorrow?  10/19: s/p bloodpatch: defer to ct surgery  10/21: still wi th air leak
stil with pneumothorax following stopping off suctioning? for blood patch tomorrow?  10/19: s/p bloodpatch: defer to ct surgery  10/21: still wi th air leak  10/23: ines NP: check cbc

## 2017-11-01 NOTE — PROGRESS NOTE ADULT - PROBLEM SELECTOR PLAN 6
10/7: start prednisone 20 mg per day  10/09: started back on IV steroids given his increased wheezing!!  10/10: wheezing better: decrease steroids to 20 mg q 8  10//: decrease and change prednisone to 40 mg daily: probably will be discharged on some prednisone  10/13: decrease prednisone to 30 mg tomorrow  10/15: on 30 mg of prednisone  10/16: on tapering steroids  10/17: stable: cont prednisone , will probably be dced on 20 mg of prednisone chan  10/17: stable   10/18: stable  10/19: cont steroids fro now  10/20: on 20 mg of prednisone for now  10/21: stable  10/22: decrease prednisone to 10 mg a day :he has not been wheezing today  10/24: stable  10/27? candidate of lung transplant? need to visit this issue once this acute condition is over!!  10/28: probably will end up on chronic steroids upon discharge!!  10/30: prednisone changed to tapering predniosne  10/31: SLOW TAPER OF STEROIDS  11/1: slow taper of steroids

## 2017-11-01 NOTE — PROGRESS NOTE ADULT - PROBLEM SELECTOR PLAN 4
1/10was on 40 mg three times a day: dw icu attending , to decrease it to 20 q 8 hours  11/11: would change to po prednisone 40 mg daily  10/12: on steroids for now with slow taper  10/13: decrease prednisone to 30 mg tomorrow  10/14: prednisone 30 md every day : and then cut down to 20 mg  10/15: cont prednisone  10/16: on tapering steroids  10/17: no wheezing pt/ot  10/19: mild wheezing today : start duoneb ATC  10/20: give duoneb q 6 hours ATC  10/21: still with off and on wheezing: cont 20 mg of steroids  10/22: decrease steroids to 10 mg a day  10/25: started on high dose steroids by cticu  10/26: pt was wheezing yesterday as well as was acutely hypercarbic: the steroids were increased and switched to IV: Decrease IV steroids to q 8 hours  10/17: it is very difficult to taper steroids on  him as when they are decreased, he starts wheezing again  10/28: decrease solumedrol to 20 mg twice a day !!  10/29: tomorrow switch to po predniosne 40 mg per day, he should be dced on steroids now  10/30: switched to po prednisone : taper slowly  10/31: CONT SYMBICORT, AND ADD SPIRIVA ON DC WITH ALBUTEROL Q 6 HOURS PRN FOR SOB AS WEILL AS SLOW TAPER OF STEROIDS  11/1: resolved

## 2017-11-01 NOTE — PROGRESS NOTE ADULT - SUBJECTIVE AND OBJECTIVE BOX
Patient is a 47y old  Male who presents with a chief complaint of "My leg wouldn't stop shaking." (14 Sep 2017 09:59)    pt is doing ok  no sob   pain +  Any change in ROS:     MEDICATIONS  (STANDING):  ALPRAZolam 0.25 milliGRAM(s) Oral every 8 hours  apixaban 5 milliGRAM(s) Oral every 12 hours  atorvastatin 80 milliGRAM(s) Oral at bedtime  buDESOnide 160 MICROgram(s)/formoterol 4.5 MICROgram(s) Inhaler 2 Puff(s) Inhalation every 12 hours  diVALproex  milliGRAM(s) Oral two times a day  docusate sodium 100 milliGRAM(s) Oral two times a day  ferrous    sulfate 325 milliGRAM(s) Oral every 8 hours  folic acid 1 milliGRAM(s) Oral daily  ipratropium    for Nebulization 500 MICROGram(s) Nebulizer every 6 hours  levalbuterol Inhalation 0.63 milliGRAM(s) Inhalation every 6 hours  lidocaine   Patch 1 Patch Transdermal daily  pantoprazole    Tablet 40 milliGRAM(s) Oral two times a day before meals  polyethylene glycol 3350 17 Gram(s) Oral at bedtime  predniSONE   Tablet 40 milliGRAM(s) Oral daily  predniSONE   Tablet   Oral   voriconazole 200 milliGRAM(s) Oral every 12 hours    MEDICATIONS  (PRN):  acetaminophen    Suspension 650 milliGRAM(s) Oral every 6 hours PRN For Temp greater than 38 C (100.4 F)  acetaminophen   Tablet. 650 milliGRAM(s) Oral every 6 hours PRN Mild Pain (1 - 3)  aluminum hydroxide/magnesium hydroxide/simethicone Suspension 30 milliLiter(s) Oral every 6 hours PRN Dyspepsia  oxyCODONE    IR 5 milliGRAM(s) Oral every 4 hours PRN Moderate Pain (4 - 6)  oxyCODONE    IR 10 milliGRAM(s) Oral every 4 hours PRN Severe Pain (7 - 10)    Vital Signs Last 24 Hrs  T(C): 36.9 (01 Nov 2017 12:00), Max: 37.3 (31 Oct 2017 13:19)  T(F): 98.5 (01 Nov 2017 12:00), Max: 99.1 (31 Oct 2017 13:19)  HR: 95 (01 Nov 2017 12:00) (66 - 95)  BP: 127/77 (01 Nov 2017 12:00) (125/80 - 138/85)  BP(mean): --  RR: 18 (01 Nov 2017 12:00) (18 - 18)  SpO2: 95% (01 Nov 2017 12:00) (94% - 100%)    I&O's Summary    31 Oct 2017 07:01  -  01 Nov 2017 07:00  --------------------------------------------------------  IN: 0 mL / OUT: 450 mL / NET: -450 mL          Physical Exam:   GENERAL: NAD, well-groomed, well-developed  HEENT: GIOVANI/   Atraumatic, Normocephalic  ENMT: No tonsillar erythema, exudates, or enlargement; Moist mucous membranes, Good dentition, No lesions  NECK: Supple, No JVD, Normal thyroid  CHEST/LUNG: poor resp excursion : no wheezing   CVS: Regular rate and rhythm; No murmurs, rubs, or gallops  GI: : Soft, Nontender, Nondistended; Bowel sounds present  NERVOUS SYSTEM:  Alert & Oriented X3  EXTREMITIES:  2+ Peripheral Pulses, No clubbing, cyanosis, or edema  LYMPH: No lymphadenopathy noted  SKIN: No rashes or lesions  ENDOCRINOLOGY: No Thyromegaly  PSYCH: Appropriate    Labs:                        10.3   6.64  )-----------( 124      ( 01 Nov 2017 05:40 )             32.4                         10.2   9.42  )-----------( 93       ( 31 Oct 2017 04:50 )             32.4                         10.2   10.54 )-----------( 78       ( 30 Oct 2017 05:37 )             32.9                         10.4   9.53  )-----------( 67       ( 29 Oct 2017 05:43 )             33.7     11-01    143  |  98  |  21  ----------------------------<  78  4.0   |  36<H>  |  0.71  10-30    142  |  99  |  22  ----------------------------<  104<H>  4.7   |  34<H>  |  0.73  10-29    144  |  100  |  23  ----------------------------<  120<H>  4.8   |  35<H>  |  0.92    Ca    9.1      01 Nov 2017 05:40    TPro  6.5  /  Alb  3.0<L>  /  TBili  0.2  /  DBili  x   /  AST  16  /  ALT  20  /  AlkPhos  68  10-29    CAPILLARY BLOOD GLUCOSE                D-Dimer Assay, Quantitative: 738 ng/mL (10-30 @ 05:37)  Cultures:       < from: Xray Chest 1 View AP -PORTABLE-Routine (10.31.17 @ 08:02) >  IMPRESSION:  Left lung postsurgical change again noted.    No pneumothorax seen.    Unchanged bilateral reticular opacities consistent with history of sarcoid    Trace right and small left pleural effusion not significantly changed.   Left apical opacity, possibly an apical cap of pleural fluid is again   seen.                  KEERTHI FIELDS M.D., ATTENDING RADIOLOGIST  This document has been electronically signed. Oct 31 2017 10:06AM    < end of copied text >                        Studies  Chest X-RAY  CT SCAN Chest   Venous Dopplers: LE:   CT Abdomen  Others

## 2017-11-01 NOTE — PROGRESS NOTE ADULT - PROBLEM SELECTOR PROBLEM 7
Need for prophylactic measure
Prophylactic measure
Need for prophylactic measure
Need for prophylactic measure
Prophylactic measure

## 2017-11-01 NOTE — PROGRESS NOTE ADULT - PROBLEM/PLAN-7
DISPLAY PLAN FREE TEXT

## 2017-11-01 NOTE — PROGRESS NOTE ADULT - I WAS PHYSICALLY PRESENT FOR THE KEY PORTIONS OF THE EVALUATION AND MANAGEMENT (E/M) SERVICE PROVIDED.  I AGREE WITH THE ABOVE HISTORY, PHYSICAL, AND PLAN WHICH I HAVE REVIEWED AND EDITED WHERE APPROPRIATE

## 2017-11-01 NOTE — PROGRESS NOTE ADULT - SUBJECTIVE AND OBJECTIVE BOX
Rehab Progress note 9/19:  Patient is a 47 year old male with a PMHx of sarcoidosis, HTN, COPD, and asthma who presented to the ED on 9/10/17 with severe leg spasms. These intermittent left leg spasms began suddenly two days PTA in the patient's home and became more regular and worsening in contracture. The spasm began in the quad leading to severe erratic left leg movements that can last from seconds to minutes. Once the leg spasm and contracture stops, an "intense tingling and pain sensation" which starts in the foot and radiates up the left side of his body to his neck. Once the tingling sensation reached his neck, the patient became faint, but did not fully lose consciousness. The patient also had a HA at the time of the spasm and tingling which was diffuse throughout the head and rated a 10/10 on a pain scale.     On admission pt found to have moderate inspiratory and expiratory wheezes. RVP 9/11 showed + rhino and enteroviruses. Initial CT head negative. Treated for COPD exacerbation. RRT on tele floor for CVA vs. seizure activity. AMS with obtundation, intubated for airway and transferred to MICU. On Propofol/Neosynephrine. Seen by neuro with recs for MRI, EEG 24 hr w/ video, and valproic acid load. Concern for new fat emboli seen on CT head- however no identifiable source. 9/13: MRI-  bilateral (R>L) MCA and right PCA infarcts in the frontoparietal and occipital areas which could be cardioembolic in source. TTE- positive bubble study.     Rehab progress note 10/18:   Patient with prolonged hospital course. Started on Eliquis for paroxysmal Afib. Developed persistent hemoptysis. 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 Patient followed by ID and started on broad spectrum Abx and Voriconazole to target aspergillus. Bronchoscopy on 9/21 showed DAVID bleed. Patient s/p thoracotomy with DAVID lung lobectomy on 9/24, then subsequently return to OR x 2 for hemothorax. OR cultures negative; path from OR suggestive of aspergillosis. Patient to continue voriconazole until 11/4 per ID. Currently on steroids for COPD exacerbation/sarcoidosis. Chest tube placed to suction, however with continuous air leak, patient not tolerating waterseal trials. Plan for blood patch in AM.     Interval history 11/1:  Patient s/p blood patch procedure but with continued air leak. Subsequently s/p pleurodesis. Developed fevers thereafter, deemed potentially a reaction to talc. Also became acutely hypercarbic and required transfer to CTICU on 10/25, Bipap, low dose vasopressors. Since then air leak resolved, transferred back to floors, chest tube clamped and eventually removed on 10/30. Currently on slow steroid taper per Pulmonology. Also, patient with noted thrombocytopenia, likely multifactorial per Heme/Onc (consult 10/29) and expected to improve as clinical status improves. Platelets trending upwards (124 today). Patient remains on Eliquis.     FUNCTIONAL PROGRESS--10/30  Tx Min A  Gait 30' RW with 2L O2 via NC, Min A    REVIEW OF SYSTEMS  Constitutional - No fever,  No fatigue  HEENT - No vertigo, No neck pain  Neurological - Notes continued LUE weakness, reports tremor in left hand due to "nerves"/anxiety. Denies spasms, numbness, tingling in LUE.  Skin - No rashes, No lesions   Musculoskeletal - L sided chest wall pain  Psychiatric - + anxiety    VITALS  T(C): 36.9 (11-01-17 @ 12:00), Max: 37.3 (10-31-17 @ 19:34)  HR: 91 (11-01-17 @ 16:30) (73 - 95)  BP: 127/77 (11-01-17 @ 12:00) (125/81 - 135/85)  RR: 18 (11-01-17 @ 12:00) (18 - 18)  SpO2: 97% (11-01-17 @ 16:30) (94% - 100%)  Wt(kg): --    MEDICATIONS   acetaminophen    Suspension 650 milliGRAM(s) every 6 hours PRN  acetaminophen   Tablet. 650 milliGRAM(s) every 6 hours PRN  ALPRAZolam 0.25 milliGRAM(s) every 8 hours  aluminum hydroxide/magnesium hydroxide/simethicone Suspension 30 milliLiter(s) every 6 hours PRN  apixaban 5 milliGRAM(s) every 12 hours  atorvastatin 80 milliGRAM(s) at bedtime  buDESOnide 160 MICROgram(s)/formoterol 4.5 MICROgram(s) Inhaler 2 Puff(s) every 12 hours  diVALproex  milliGRAM(s) two times a day  docusate sodium 100 milliGRAM(s) two times a day  ferrous    sulfate 325 milliGRAM(s) every 8 hours  folic acid 1 milliGRAM(s) daily  ipratropium    for Nebulization 500 MICROGram(s) every 6 hours  levalbuterol Inhalation 0.63 milliGRAM(s) every 6 hours  lidocaine   Patch 1 Patch daily  oxyCODONE    IR 5 milliGRAM(s) every 4 hours PRN  oxyCODONE    IR 10 milliGRAM(s) every 4 hours PRN  pantoprazole    Tablet 40 milliGRAM(s) two times a day before meals  polyethylene glycol 3350 17 Gram(s) at bedtime  predniSONE   Tablet     predniSONE   Tablet 30 milliGRAM(s) daily  voriconazole 200 milliGRAM(s) every 12 hours    RECENT LABS/IMAGING  CBC Full  -  ( 01 Nov 2017 05:40 )  WBC Count : 6.64 K/uL  Hemoglobin : 10.3 g/dL  Hematocrit : 32.4 %  Platelet Count - Automated : 124 K/uL  Mean Cell Volume : 97.9 fL  Mean Cell Hemoglobin : 31.1 pg  Mean Cell Hemoglobin Concentration : 31.8 %    11-01    143  |  98  |  21  ----------------------------<  78  4.0   |  36<H>  |  0.71    Ca    9.1      01 Nov 2017 05:40    PHYSICAL EXAM  Constitutional - NAD, Comfortable  HEENT - NCAT, EOMI  Neck - Supple  Chest - Coarse, pt on RA  Cardiovascular - RRR, S1S2  Abdomen - BS+, Soft, NTND  Extremities - Mild LE edema L > R , improved from prior  Neurologic Exam -                    Cognitive - Awake, Alert, AAO to self, place, date, year, situation     Communication - Fluent, No dysarthria     Cranial Nerves - CN 2-12 grossly intact     Motor - *Mild SOB with strength testing, O2 sats mostly stable on RA*                    LEFT    UE - ShAB 3/5, EF 4/5, EE 4/5, WE 3+/5,  4/5--noted intention tremor L hand                    RIGHT UE - ShAB 5/5, EF 5/5, EE 5/5, WE 5/5,  5/5                    LEFT    LE - HF 5/5, KE 5/5, DF 5/5, PF 5/5                    RIGHT LE - HF 5/5, KE 5/5, DF 5/5, PF 5/5        Sensory - Intact to light touch     Reflexes - DTR intact and symmetrical  Psychiatric - Anxious    ASSESSMENT/PLAN  48 y/o M with bilateral MCA and R PCA CVA, hemoptysis/aspergillosis s/p DAVID thoracotomy, presenting with left hemiparesis, deconditioning secondary to prolonged hospital stay and overall pulmonary status, as well as functional, gait, ADL impairments.    -Disposition -  Recommend ACUTE inpatient rehabilitation, ONCE MEDICALLY OPTIMIZED, for the functional deficits consisting of 3 hours of therapy/day & 24 hour RN/daily PMR physician for comorbid medical management. Will continue to follow for ongoing rehab needs and recommendations.   -PT/OT - ROM, Bed mobility, Transfers, Ambulation with assistive device, ROM. O2 NC prn during therapy for optimal participation and tolerance  -Pain control - Oxycodone prn, lidoderm patch  -Anxiety - Alprazolam  -Precautions - Falls, Cardiac, pulmonary, seizure  -DVT Prophylaxis - Eliquis  -Diet - Regular    Patient seen and examined and rehab plan discussed with attending Dr. Bedoya.

## 2017-11-01 NOTE — PROGRESS NOTE ADULT - PROBLEM SELECTOR PLAN 2
pt has been having slowly decreasing platelets: he is on eliquis too: no bleeding. defer to primary team  10/22: please recheck his platelets  10/24: 81: stable: no bleeding  10/25: will be followed in cticu  10/26: cont to get worse: would suggest to get HEMONC  10/27: better slightly today: defer to cticu  10/28: 60: monitor for bleeding  10/28: ? hemonc  10/30: better today  10/31: SEEMS TO BE GETTING BETTER  11/1: improved

## 2017-11-02 ENCOUNTER — INPATIENT (INPATIENT)
Facility: HOSPITAL | Age: 47
LOS: 5 days | Discharge: ANOTHER HOSPITAL | DRG: 949 | End: 2017-11-08
Attending: PSYCHIATRY & NEUROLOGY | Admitting: PSYCHIATRY & NEUROLOGY
Payer: MEDICAID

## 2017-11-02 VITALS
DIASTOLIC BLOOD PRESSURE: 92 MMHG | HEART RATE: 85 BPM | RESPIRATION RATE: 18 BRPM | TEMPERATURE: 99 F | SYSTOLIC BLOOD PRESSURE: 133 MMHG | OXYGEN SATURATION: 100 %

## 2017-11-02 DIAGNOSIS — I69.310 ATTENTION AND CONCENTRATION DEFICIT FOLLOWING CEREBRAL INFARCTION: ICD-10-CM

## 2017-11-02 DIAGNOSIS — B44.1 OTHER PULMONARY ASPERGILLOSIS: ICD-10-CM

## 2017-11-02 DIAGNOSIS — R04.2 HEMOPTYSIS: ICD-10-CM

## 2017-11-02 DIAGNOSIS — Q21.1 ATRIAL SEPTAL DEFECT: ICD-10-CM

## 2017-11-02 DIAGNOSIS — F41.8 OTHER SPECIFIED ANXIETY DISORDERS: ICD-10-CM

## 2017-11-02 DIAGNOSIS — E78.5 HYPERLIPIDEMIA, UNSPECIFIED: ICD-10-CM

## 2017-11-02 DIAGNOSIS — Z48.813 ENCOUNTER FOR SURGICAL AFTERCARE FOLLOWING SURGERY ON THE RESPIRATORY SYSTEM: ICD-10-CM

## 2017-11-02 DIAGNOSIS — J44.9 CHRONIC OBSTRUCTIVE PULMONARY DISEASE, UNSPECIFIED: ICD-10-CM

## 2017-11-02 DIAGNOSIS — I48.91 UNSPECIFIED ATRIAL FIBRILLATION: ICD-10-CM

## 2017-11-02 DIAGNOSIS — Z99.81 DEPENDENCE ON SUPPLEMENTAL OXYGEN: ICD-10-CM

## 2017-11-02 DIAGNOSIS — K21.9 GASTRO-ESOPHAGEAL REFLUX DISEASE WITHOUT ESOPHAGITIS: ICD-10-CM

## 2017-11-02 DIAGNOSIS — D86.9 SARCOIDOSIS, UNSPECIFIED: ICD-10-CM

## 2017-11-02 DIAGNOSIS — R27.8 OTHER LACK OF COORDINATION: ICD-10-CM

## 2017-11-02 DIAGNOSIS — I69.354 HEMIPLEGIA AND HEMIPARESIS FOLLOWING CEREBRAL INFARCTION AFFECTING LEFT NON-DOMINANT SIDE: ICD-10-CM

## 2017-11-02 DIAGNOSIS — J93.81 CHRONIC PNEUMOTHORAX: ICD-10-CM

## 2017-11-02 DIAGNOSIS — Z51.89 ENCOUNTER FOR OTHER SPECIFIED AFTERCARE: ICD-10-CM

## 2017-11-02 DIAGNOSIS — Z79.52 LONG TERM (CURRENT) USE OF SYSTEMIC STEROIDS: ICD-10-CM

## 2017-11-02 DIAGNOSIS — D64.9 ANEMIA, UNSPECIFIED: ICD-10-CM

## 2017-11-02 DIAGNOSIS — I63.9 CEREBRAL INFARCTION, UNSPECIFIED: ICD-10-CM

## 2017-11-02 DIAGNOSIS — G47.00 INSOMNIA, UNSPECIFIED: ICD-10-CM

## 2017-11-02 DIAGNOSIS — R60.0 LOCALIZED EDEMA: ICD-10-CM

## 2017-11-02 DIAGNOSIS — D72.829 ELEVATED WHITE BLOOD CELL COUNT, UNSPECIFIED: ICD-10-CM

## 2017-11-02 DIAGNOSIS — I10 ESSENTIAL (PRIMARY) HYPERTENSION: ICD-10-CM

## 2017-11-02 DIAGNOSIS — Z79.01 LONG TERM (CURRENT) USE OF ANTICOAGULANTS: ICD-10-CM

## 2017-11-02 LAB — ACID FAST STN SPEC: SIGNIFICANT CHANGE UP

## 2017-11-02 PROCEDURE — 93010 ELECTROCARDIOGRAM REPORT: CPT

## 2017-11-02 PROCEDURE — 71010: CPT | Mod: 26

## 2017-11-02 PROCEDURE — 99238 HOSP IP/OBS DSCHRG MGMT 30/<: CPT

## 2017-11-02 RX ORDER — ACETAMINOPHEN 500 MG
2 TABLET ORAL
Qty: 0 | Refills: 0 | COMMUNITY
Start: 2017-11-02

## 2017-11-02 RX ORDER — OXYCODONE HYDROCHLORIDE 5 MG/1
1 TABLET ORAL
Qty: 0 | Refills: 0 | COMMUNITY
Start: 2017-11-02

## 2017-11-02 RX ORDER — ATORVASTATIN CALCIUM 80 MG/1
1 TABLET, FILM COATED ORAL
Qty: 0 | Refills: 0 | COMMUNITY
Start: 2017-11-02

## 2017-11-02 RX ORDER — UMECLIDINIUM 62.5 UG/1
1 AEROSOL, POWDER ORAL
Qty: 0 | Refills: 0 | COMMUNITY

## 2017-11-02 RX ORDER — POLYETHYLENE GLYCOL 3350 17 G/17G
17 POWDER, FOR SOLUTION ORAL
Qty: 0 | Refills: 0 | COMMUNITY
Start: 2017-11-02

## 2017-11-02 RX ORDER — IPRATROPIUM BROMIDE 0.2 MG/ML
2.5 SOLUTION, NON-ORAL INHALATION
Qty: 0 | Refills: 0 | COMMUNITY
Start: 2017-11-02

## 2017-11-02 RX ORDER — FERROUS SULFATE 325(65) MG
1 TABLET ORAL
Qty: 0 | Refills: 0 | COMMUNITY
Start: 2017-11-02

## 2017-11-02 RX ORDER — DOCUSATE SODIUM 100 MG
1 CAPSULE ORAL
Qty: 0 | Refills: 0 | COMMUNITY
Start: 2017-11-02

## 2017-11-02 RX ORDER — LEVALBUTEROL 1.25 MG/.5ML
3 SOLUTION, CONCENTRATE RESPIRATORY (INHALATION)
Qty: 0 | Refills: 0 | COMMUNITY
Start: 2017-11-02

## 2017-11-02 RX ORDER — VORICONAZOLE 10 MG/ML
1 INJECTION, POWDER, LYOPHILIZED, FOR SOLUTION INTRAVENOUS
Qty: 0 | Refills: 0 | COMMUNITY
Start: 2017-11-02

## 2017-11-02 RX ORDER — APIXABAN 2.5 MG/1
1 TABLET, FILM COATED ORAL
Qty: 0 | Refills: 0 | COMMUNITY
Start: 2017-11-02

## 2017-11-02 RX ORDER — ACETAMINOPHEN 500 MG
650 TABLET ORAL EVERY 6 HOURS
Qty: 0 | Refills: 0 | Status: DISCONTINUED | OUTPATIENT
Start: 2017-11-02 | End: 2017-11-02

## 2017-11-02 RX ORDER — BUDESONIDE AND FORMOTEROL FUMARATE DIHYDRATE 160; 4.5 UG/1; UG/1
2 AEROSOL RESPIRATORY (INHALATION)
Qty: 0 | Refills: 0 | COMMUNITY

## 2017-11-02 RX ORDER — LIDOCAINE 4 G/100G
1 CREAM TOPICAL
Qty: 0 | Refills: 0 | COMMUNITY
Start: 2017-11-02

## 2017-11-02 RX ORDER — BUDESONIDE AND FORMOTEROL FUMARATE DIHYDRATE 160; 4.5 UG/1; UG/1
2 AEROSOL RESPIRATORY (INHALATION)
Qty: 0 | Refills: 0 | COMMUNITY
Start: 2017-11-02

## 2017-11-02 RX ORDER — PANTOPRAZOLE SODIUM 20 MG/1
1 TABLET, DELAYED RELEASE ORAL
Qty: 0 | Refills: 0 | COMMUNITY
Start: 2017-11-02

## 2017-11-02 RX ORDER — DIVALPROEX SODIUM 500 MG/1
1 TABLET, DELAYED RELEASE ORAL
Qty: 0 | Refills: 0 | COMMUNITY
Start: 2017-11-02

## 2017-11-02 RX ORDER — FOLIC ACID 0.8 MG
1 TABLET ORAL
Qty: 0 | Refills: 0 | COMMUNITY
Start: 2017-11-02

## 2017-11-02 RX ORDER — LOSARTAN POTASSIUM 100 MG/1
2 TABLET, FILM COATED ORAL
Qty: 0 | Refills: 0 | COMMUNITY

## 2017-11-02 RX ADMIN — Medication 325 MILLIGRAM(S): at 06:42

## 2017-11-02 RX ADMIN — Medication 325 MILLIGRAM(S): at 14:34

## 2017-11-02 RX ADMIN — PANTOPRAZOLE SODIUM 40 MILLIGRAM(S): 20 TABLET, DELAYED RELEASE ORAL at 06:42

## 2017-11-02 RX ADMIN — Medication 500 MICROGRAM(S): at 03:47

## 2017-11-02 RX ADMIN — LIDOCAINE 1 PATCH: 4 CREAM TOPICAL at 14:34

## 2017-11-02 RX ADMIN — Medication 30 MILLIGRAM(S): at 07:04

## 2017-11-02 RX ADMIN — DIVALPROEX SODIUM 500 MILLIGRAM(S): 500 TABLET, DELAYED RELEASE ORAL at 06:43

## 2017-11-02 RX ADMIN — LEVALBUTEROL 0.63 MILLIGRAM(S): 1.25 SOLUTION, CONCENTRATE RESPIRATORY (INHALATION) at 03:47

## 2017-11-02 RX ADMIN — VORICONAZOLE 200 MILLIGRAM(S): 10 INJECTION, POWDER, LYOPHILIZED, FOR SOLUTION INTRAVENOUS at 06:42

## 2017-11-02 RX ADMIN — Medication 500 MICROGRAM(S): at 10:12

## 2017-11-02 RX ADMIN — BUDESONIDE AND FORMOTEROL FUMARATE DIHYDRATE 2 PUFF(S): 160; 4.5 AEROSOL RESPIRATORY (INHALATION) at 08:57

## 2017-11-02 RX ADMIN — APIXABAN 5 MILLIGRAM(S): 2.5 TABLET, FILM COATED ORAL at 06:42

## 2017-11-02 RX ADMIN — LEVALBUTEROL 0.63 MILLIGRAM(S): 1.25 SOLUTION, CONCENTRATE RESPIRATORY (INHALATION) at 10:12

## 2017-11-02 RX ADMIN — Medication 1 MILLIGRAM(S): at 14:34

## 2017-11-03 PROCEDURE — 96116 NUBHVL XM PHYS/QHP 1ST HR: CPT

## 2017-11-03 PROCEDURE — 71250 CT THORAX DX C-: CPT | Mod: 26

## 2017-11-03 PROCEDURE — 93010 ELECTROCARDIOGRAM REPORT: CPT

## 2017-11-03 PROCEDURE — 99223 1ST HOSP IP/OBS HIGH 75: CPT

## 2017-11-03 PROCEDURE — 93970 EXTREMITY STUDY: CPT | Mod: 26

## 2017-11-04 PROCEDURE — 99232 SBSQ HOSP IP/OBS MODERATE 35: CPT

## 2017-11-04 PROCEDURE — 90792 PSYCH DIAG EVAL W/MED SRVCS: CPT

## 2017-11-05 LAB — ACID FAST STN SPEC: SIGNIFICANT CHANGE UP

## 2017-11-05 PROCEDURE — 99232 SBSQ HOSP IP/OBS MODERATE 35: CPT

## 2017-11-06 PROCEDURE — 99232 SBSQ HOSP IP/OBS MODERATE 35: CPT

## 2017-11-07 PROCEDURE — 99233 SBSQ HOSP IP/OBS HIGH 50: CPT

## 2017-11-08 ENCOUNTER — INPATIENT (INPATIENT)
Facility: HOSPITAL | Age: 47
LOS: 62 days | Discharge: NURSING, REHAB OR LONG-TERM CARE | End: 2018-01-10
Attending: INTERNAL MEDICINE | Admitting: THORACIC SURGERY (CARDIOTHORACIC VASCULAR SURGERY)
Payer: MEDICAID

## 2017-11-08 VITALS
DIASTOLIC BLOOD PRESSURE: 72 MMHG | RESPIRATION RATE: 17 BRPM | TEMPERATURE: 98 F | SYSTOLIC BLOOD PRESSURE: 116 MMHG | OXYGEN SATURATION: 98 % | HEART RATE: 90 BPM

## 2017-11-08 DIAGNOSIS — R04.2 HEMOPTYSIS: ICD-10-CM

## 2017-11-08 LAB
ALBUMIN SERPL ELPH-MCNC: 3.6 G/DL — SIGNIFICANT CHANGE UP (ref 3.3–5)
ALP SERPL-CCNC: 89 U/L — SIGNIFICANT CHANGE UP (ref 40–120)
ALT FLD-CCNC: 13 U/L — SIGNIFICANT CHANGE UP (ref 4–41)
AST SERPL-CCNC: 9 U/L — SIGNIFICANT CHANGE UP (ref 4–40)
BASOPHILS # BLD AUTO: 0.01 K/UL — SIGNIFICANT CHANGE UP (ref 0–0.2)
BASOPHILS NFR BLD AUTO: 0.1 % — SIGNIFICANT CHANGE UP (ref 0–2)
BILIRUB SERPL-MCNC: < 0.2 MG/DL — LOW (ref 0.2–1.2)
BUN SERPL-MCNC: 18 MG/DL — SIGNIFICANT CHANGE UP (ref 7–23)
CALCIUM SERPL-MCNC: 9 MG/DL — SIGNIFICANT CHANGE UP (ref 8.4–10.5)
CHLORIDE SERPL-SCNC: 99 MMOL/L — SIGNIFICANT CHANGE UP (ref 98–107)
CO2 SERPL-SCNC: 36 MMOL/L — HIGH (ref 22–31)
CREAT SERPL-MCNC: 0.96 MG/DL — SIGNIFICANT CHANGE UP (ref 0.5–1.3)
EOSINOPHIL # BLD AUTO: 0 K/UL — SIGNIFICANT CHANGE UP (ref 0–0.5)
EOSINOPHIL NFR BLD AUTO: 0 % — SIGNIFICANT CHANGE UP (ref 0–6)
GLUCOSE SERPL-MCNC: 121 MG/DL — HIGH (ref 70–99)
HCT VFR BLD CALC: 34.6 % — LOW (ref 39–50)
HGB BLD-MCNC: 10.8 G/DL — LOW (ref 13–17)
IMM GRANULOCYTES # BLD AUTO: 0.05 # — SIGNIFICANT CHANGE UP
IMM GRANULOCYTES NFR BLD AUTO: 0.4 % — SIGNIFICANT CHANGE UP (ref 0–1.5)
LYMPHOCYTES # BLD AUTO: 1.57 K/UL — SIGNIFICANT CHANGE UP (ref 1–3.3)
LYMPHOCYTES # BLD AUTO: 12.9 % — LOW (ref 13–44)
MCHC RBC-ENTMCNC: 30.8 PG — SIGNIFICANT CHANGE UP (ref 27–34)
MCHC RBC-ENTMCNC: 31.2 % — LOW (ref 32–36)
MCV RBC AUTO: 98.6 FL — SIGNIFICANT CHANGE UP (ref 80–100)
MONOCYTES # BLD AUTO: 0.76 K/UL — SIGNIFICANT CHANGE UP (ref 0–0.9)
MONOCYTES NFR BLD AUTO: 6.3 % — SIGNIFICANT CHANGE UP (ref 2–14)
NEUTROPHILS # BLD AUTO: 9.76 K/UL — HIGH (ref 1.8–7.4)
NEUTROPHILS NFR BLD AUTO: 80.3 % — HIGH (ref 43–77)
NRBC # FLD: 0 — SIGNIFICANT CHANGE UP
PLATELET # BLD AUTO: 268 K/UL — SIGNIFICANT CHANGE UP (ref 150–400)
PMV BLD: 12.9 FL — SIGNIFICANT CHANGE UP (ref 7–13)
POTASSIUM SERPL-MCNC: 5.1 MMOL/L — SIGNIFICANT CHANGE UP (ref 3.5–5.3)
POTASSIUM SERPL-SCNC: 5.1 MMOL/L — SIGNIFICANT CHANGE UP (ref 3.5–5.3)
PROT SERPL-MCNC: 6.5 G/DL — SIGNIFICANT CHANGE UP (ref 6–8.3)
RBC # BLD: 3.51 M/UL — LOW (ref 4.2–5.8)
RBC # FLD: 15.7 % — HIGH (ref 10.3–14.5)
SODIUM SERPL-SCNC: 142 MMOL/L — SIGNIFICANT CHANGE UP (ref 135–145)
WBC # BLD: 12.15 K/UL — HIGH (ref 3.8–10.5)
WBC # FLD AUTO: 12.15 K/UL — HIGH (ref 3.8–10.5)

## 2017-11-08 PROCEDURE — 71020: CPT | Mod: 26

## 2017-11-08 PROCEDURE — 99232 SBSQ HOSP IP/OBS MODERATE 35: CPT

## 2017-11-08 PROCEDURE — 71010: CPT | Mod: 26

## 2017-11-08 PROCEDURE — 99233 SBSQ HOSP IP/OBS HIGH 50: CPT

## 2017-11-08 PROCEDURE — 99221 1ST HOSP IP/OBS SF/LOW 40: CPT

## 2017-11-08 NOTE — ED PROVIDER NOTE - PMH
Asthma    COPD (chronic obstructive pulmonary disease)    History of pneumothorax  History of 3 prior pneumonthoracies.  Hypertension    Sarcoidosis

## 2017-11-08 NOTE — ED PROVIDER NOTE - MEDICAL DECISION MAKING DETAILS
Neg active hemoptysis or respiratory distress. VSS. Basic labs and discuss plan with accepting service

## 2017-11-08 NOTE — ED PROVIDER NOTE - OBJECTIVE STATEMENT
21:53 Cezar att: 47M 21:53 Cezar att: 47M h/o COPD, sarcoid, DAVID aspergiomma s/p cavernoma resection, 9/11/17 cva lue lle weak    47M c/o incr hemoptysis on Eliquist, xsfr [facility] accepted by [facility]. Protecting airway, sat 97 on 2L NC. PMH Sarcoidosis, COPD, Lt Upper Lobe Aspergillosis Cavernoma, CVA. [facility] this AM. 21:53 Cezar att: 47M h/o COPD, sarcoid, DAVID aspergiomma s/p cavernoma resection, 9/11/17 cva lue lle weak transfer from [facility] to [facility] for hemoptysis on Eliquist. Per patient 9/11 cva with lue and lle weak, admitted to [facility]. Per [facility] Intensivist today patient had two episodes of hemoptysis, last dose of eliquist this morning, case d/w [facility] CT Surg Jaky, accepted to [facility].   47M c/o incr hemoptysis on Eliquist, xsfr [facility] accepted by [facility]. Protecting airway, sat 97 on 2L NC. PMH Sarcoidosis, COPD, Lt Upper Lobe Aspergillosis Cavernoma, CVA. Last Eliquist 11/8 this AM. 21:53 Cezar att: 47M h/o COPD, sarcoid, DAVID aspergiomma s/p cavernoma resection, 9/11/17 cva lue lle weak transfer from [facility] to [facility] for hemoptysis on Eliquist. Per patient 9/11 cva with lue and lle weak, admitted to [facility]. Per [facility] Intensivist today patient had two episodes of hemoptysis, last dose of eliquist this morning, case d/w [facility] CT Surg Jaky, accepted to [facility]. Patient clarifies has coughed cupfuls of blood in past, today coughed 2 teaspoons of blood. Denies loc, near loc, cp, sob, palpitations.

## 2017-11-08 NOTE — ED ADULT NURSE NOTE - CHIEF COMPLAINT QUOTE
Pt arrives from [facility] as a Surgery consult.  Pt has been in rehab for a recent cva with left sided residual weakness.  Pt had a hemathorax and pneumothorax with partial left upper lobe removal.  Pt has developed a freedom with bleeding which is the reason for the consult.  Pt has hx of COPD and Asthma and arrives with a left side expiratory wheeze.  Pt able to ambulate.  Charge RN notified.  Pt does not have iv access. - info packet in chart.

## 2017-11-08 NOTE — ED ADULT TRIAGE NOTE - CHIEF COMPLAINT QUOTE
Pt arrives from [facility] as a Surgery consult.  Pt has been in rehab for a recent cva with left sided residual weakness.  Pt had a hemathorax and pneumothorax with partial left upper lobe removal.  Pt has developed a freedom with bleeding which is the reason for the consult.  Pt has hx of COPD and Asthma and arrives with a left side expiratory wheeze.  Pt able to ambulate.  Charge RN notified.  Pt does not have iv access. Pt arrives from [facility] as a Surgery consult.  Pt has been in rehab for a recent cva with left sided residual weakness.  Pt had a hemathorax and pneumothorax with partial left upper lobe removal.  Pt has developed a freedom with bleeding which is the reason for the consult.  Pt has hx of COPD and Asthma and arrives with a left side expiratory wheeze.  Pt able to ambulate.  Charge RN notified.  Pt does not have iv access. - info packet in chart.

## 2017-11-08 NOTE — ED ADULT NURSE NOTE - OBJECTIVE STATEMENT
sivakumar RN: pt received to rosa DE LA CRUZ states was sent here from rehab center for 2 episodes of hemoptysis today. had thoracic surgery here in September. pt denies any pain, sob, or any symptoms. sputum is scant and bright red. incision healed, no signs of infection. pt denies any complaints. appears comfortable. has residual left sided weakness from prior cva. able to ambulate with assistance. labs sent. 20 g iv placed in right ac. sats 100% on 2L nasal cannula. CTICU consult at bedside for eval. report given to CHARLIE Dumont in area.

## 2017-11-08 NOTE — ED PROVIDER NOTE - FAMILY HISTORY
Family history of pancreatic cancer     Father  Still living? Unknown  Family history of essential hypertension, Age at diagnosis: Age Unknown

## 2017-11-08 NOTE — ED PROVIDER NOTE - PROGRESS NOTE DETAILS
Cezar att: Patient aaox3, nad, breathing comfortably on 2L NC as per baseline. Floor calling for signout upon ED arrival. Case d/w CT Surg, request cbc cmp cxr, sending CT-ICU Attg to eval for ICu. Cezar att: Seen by CT-ICU attg who recommends floor at this time. Patient h-h stable. Admitted to Surg floors.

## 2017-11-09 DIAGNOSIS — I10 ESSENTIAL (PRIMARY) HYPERTENSION: ICD-10-CM

## 2017-11-09 DIAGNOSIS — D86.9 SARCOIDOSIS, UNSPECIFIED: ICD-10-CM

## 2017-11-09 DIAGNOSIS — R04.2 HEMOPTYSIS: ICD-10-CM

## 2017-11-09 DIAGNOSIS — J44.9 CHRONIC OBSTRUCTIVE PULMONARY DISEASE, UNSPECIFIED: ICD-10-CM

## 2017-11-09 DIAGNOSIS — Z98.890 OTHER SPECIFIED POSTPROCEDURAL STATES: Chronic | ICD-10-CM

## 2017-11-09 DIAGNOSIS — Z87.09 PERSONAL HISTORY OF OTHER DISEASES OF THE RESPIRATORY SYSTEM: ICD-10-CM

## 2017-11-09 LAB
BUN SERPL-MCNC: 19 MG/DL — SIGNIFICANT CHANGE UP (ref 7–23)
CALCIUM SERPL-MCNC: 8.6 MG/DL — SIGNIFICANT CHANGE UP (ref 8.4–10.5)
CHLORIDE SERPL-SCNC: 101 MMOL/L — SIGNIFICANT CHANGE UP (ref 98–107)
CO2 SERPL-SCNC: 34 MMOL/L — HIGH (ref 22–31)
CREAT SERPL-MCNC: 0.84 MG/DL — SIGNIFICANT CHANGE UP (ref 0.5–1.3)
FUNGUS SPEC QL CULT: SIGNIFICANT CHANGE UP
GLUCOSE SERPL-MCNC: 97 MG/DL — SIGNIFICANT CHANGE UP (ref 70–99)
HCT VFR BLD CALC: 33.7 % — LOW (ref 39–50)
HGB BLD-MCNC: 10.8 G/DL — LOW (ref 13–17)
MCHC RBC-ENTMCNC: 31.6 PG — SIGNIFICANT CHANGE UP (ref 27–34)
MCHC RBC-ENTMCNC: 32 % — SIGNIFICANT CHANGE UP (ref 32–36)
MCV RBC AUTO: 98.5 FL — SIGNIFICANT CHANGE UP (ref 80–100)
NRBC # FLD: 0 — SIGNIFICANT CHANGE UP
PLATELET # BLD AUTO: 228 K/UL — SIGNIFICANT CHANGE UP (ref 150–400)
PMV BLD: 12.9 FL — SIGNIFICANT CHANGE UP (ref 7–13)
POTASSIUM SERPL-MCNC: 4.8 MMOL/L — SIGNIFICANT CHANGE UP (ref 3.5–5.3)
POTASSIUM SERPL-SCNC: 4.8 MMOL/L — SIGNIFICANT CHANGE UP (ref 3.5–5.3)
RBC # BLD: 3.42 M/UL — LOW (ref 4.2–5.8)
RBC # FLD: 15.6 % — HIGH (ref 10.3–14.5)
SODIUM SERPL-SCNC: 142 MMOL/L — SIGNIFICANT CHANGE UP (ref 135–145)
WBC # BLD: 12.47 K/UL — HIGH (ref 3.8–10.5)
WBC # FLD AUTO: 12.47 K/UL — HIGH (ref 3.8–10.5)

## 2017-11-09 PROCEDURE — 71010: CPT | Mod: 26

## 2017-11-09 PROCEDURE — 97167 OT EVAL HIGH COMPLEX 60 MIN: CPT

## 2017-11-09 PROCEDURE — 85610 PROTHROMBIN TIME: CPT

## 2017-11-09 PROCEDURE — 92507 TX SP LANG VOICE COMM INDIV: CPT

## 2017-11-09 PROCEDURE — 85027 COMPLETE CBC AUTOMATED: CPT

## 2017-11-09 PROCEDURE — 86606 ASPERGILLUS ANTIBODY: CPT

## 2017-11-09 PROCEDURE — 80164 ASSAY DIPROPYLACETIC ACD TOT: CPT

## 2017-11-09 PROCEDURE — 97530 THERAPEUTIC ACTIVITIES: CPT

## 2017-11-09 PROCEDURE — 82248 BILIRUBIN DIRECT: CPT

## 2017-11-09 PROCEDURE — 97116 GAIT TRAINING THERAPY: CPT

## 2017-11-09 PROCEDURE — 92610 EVALUATE SWALLOWING FUNCTION: CPT

## 2017-11-09 PROCEDURE — 71045 X-RAY EXAM CHEST 1 VIEW: CPT

## 2017-11-09 PROCEDURE — 94640 AIRWAY INHALATION TREATMENT: CPT

## 2017-11-09 PROCEDURE — 97163 PT EVAL HIGH COMPLEX 45 MIN: CPT

## 2017-11-09 PROCEDURE — 93005 ELECTROCARDIOGRAM TRACING: CPT

## 2017-11-09 PROCEDURE — 71250 CT THORAX DX C-: CPT

## 2017-11-09 PROCEDURE — 80076 HEPATIC FUNCTION PANEL: CPT

## 2017-11-09 PROCEDURE — 92523 SPEECH SOUND LANG COMPREHEN: CPT

## 2017-11-09 PROCEDURE — 80053 COMPREHEN METABOLIC PANEL: CPT

## 2017-11-09 PROCEDURE — 85025 COMPLETE CBC W/AUTO DIFF WBC: CPT

## 2017-11-09 PROCEDURE — 97110 THERAPEUTIC EXERCISES: CPT

## 2017-11-09 PROCEDURE — 97535 SELF CARE MNGMENT TRAINING: CPT

## 2017-11-09 PROCEDURE — 93970 EXTREMITY STUDY: CPT

## 2017-11-09 PROCEDURE — 97112 NEUROMUSCULAR REEDUCATION: CPT

## 2017-11-09 RX ORDER — OXYCODONE HYDROCHLORIDE 5 MG/1
10 TABLET ORAL EVERY 4 HOURS
Qty: 0 | Refills: 0 | Status: DISCONTINUED | OUTPATIENT
Start: 2017-11-09 | End: 2017-11-14

## 2017-11-09 RX ORDER — PANTOPRAZOLE SODIUM 20 MG/1
40 TABLET, DELAYED RELEASE ORAL
Qty: 0 | Refills: 0 | Status: DISCONTINUED | OUTPATIENT
Start: 2017-11-09 | End: 2017-11-16

## 2017-11-09 RX ORDER — IPRATROPIUM BROMIDE 0.2 MG/ML
500 SOLUTION, NON-ORAL INHALATION EVERY 6 HOURS
Qty: 0 | Refills: 0 | Status: DISCONTINUED | OUTPATIENT
Start: 2017-11-09 | End: 2017-11-15

## 2017-11-09 RX ORDER — DOCUSATE SODIUM 100 MG
100 CAPSULE ORAL
Qty: 0 | Refills: 0 | Status: DISCONTINUED | OUTPATIENT
Start: 2017-11-09 | End: 2017-11-19

## 2017-11-09 RX ORDER — FOLIC ACID 0.8 MG
1 TABLET ORAL DAILY
Qty: 0 | Refills: 0 | Status: DISCONTINUED | OUTPATIENT
Start: 2017-11-09 | End: 2018-01-10

## 2017-11-09 RX ORDER — ATORVASTATIN CALCIUM 80 MG/1
80 TABLET, FILM COATED ORAL AT BEDTIME
Qty: 0 | Refills: 0 | Status: DISCONTINUED | OUTPATIENT
Start: 2017-11-09 | End: 2017-11-23

## 2017-11-09 RX ORDER — ACETAMINOPHEN 500 MG
650 TABLET ORAL EVERY 6 HOURS
Qty: 0 | Refills: 0 | Status: DISCONTINUED | OUTPATIENT
Start: 2017-11-09 | End: 2017-11-16

## 2017-11-09 RX ORDER — LEVALBUTEROL 1.25 MG/.5ML
0.63 SOLUTION, CONCENTRATE RESPIRATORY (INHALATION) EVERY 6 HOURS
Qty: 0 | Refills: 0 | Status: DISCONTINUED | OUTPATIENT
Start: 2017-11-09 | End: 2017-11-15

## 2017-11-09 RX ORDER — BUDESONIDE AND FORMOTEROL FUMARATE DIHYDRATE 160; 4.5 UG/1; UG/1
2 AEROSOL RESPIRATORY (INHALATION)
Qty: 0 | Refills: 0 | Status: DISCONTINUED | OUTPATIENT
Start: 2017-11-09 | End: 2018-01-10

## 2017-11-09 RX ORDER — FERROUS SULFATE 325(65) MG
325 TABLET ORAL
Qty: 0 | Refills: 0 | Status: DISCONTINUED | OUTPATIENT
Start: 2017-11-09 | End: 2017-11-30

## 2017-11-09 RX ORDER — LIDOCAINE 4 G/100G
1 CREAM TOPICAL DAILY
Qty: 0 | Refills: 0 | Status: DISCONTINUED | OUTPATIENT
Start: 2017-11-09 | End: 2017-11-19

## 2017-11-09 RX ORDER — DIVALPROEX SODIUM 500 MG/1
500 TABLET, DELAYED RELEASE ORAL
Qty: 0 | Refills: 0 | Status: DISCONTINUED | OUTPATIENT
Start: 2017-11-09 | End: 2017-11-15

## 2017-11-09 RX ORDER — POLYETHYLENE GLYCOL 3350 17 G/17G
17 POWDER, FOR SOLUTION ORAL AT BEDTIME
Qty: 0 | Refills: 0 | Status: DISCONTINUED | OUTPATIENT
Start: 2017-11-09 | End: 2017-12-12

## 2017-11-09 RX ORDER — OXYCODONE HYDROCHLORIDE 5 MG/1
5 TABLET ORAL EVERY 4 HOURS
Qty: 0 | Refills: 0 | Status: DISCONTINUED | OUTPATIENT
Start: 2017-11-09 | End: 2017-11-15

## 2017-11-09 RX ADMIN — Medication 100 MILLIGRAM(S): at 17:56

## 2017-11-09 RX ADMIN — LEVALBUTEROL 0.63 MILLIGRAM(S): 1.25 SOLUTION, CONCENTRATE RESPIRATORY (INHALATION) at 04:33

## 2017-11-09 RX ADMIN — BUDESONIDE AND FORMOTEROL FUMARATE DIHYDRATE 2 PUFF(S): 160; 4.5 AEROSOL RESPIRATORY (INHALATION) at 21:11

## 2017-11-09 RX ADMIN — ATORVASTATIN CALCIUM 80 MILLIGRAM(S): 80 TABLET, FILM COATED ORAL at 21:11

## 2017-11-09 RX ADMIN — OXYCODONE HYDROCHLORIDE 10 MILLIGRAM(S): 5 TABLET ORAL at 21:42

## 2017-11-09 RX ADMIN — Medication 100 MILLIGRAM(S): at 05:53

## 2017-11-09 RX ADMIN — BUDESONIDE AND FORMOTEROL FUMARATE DIHYDRATE 2 PUFF(S): 160; 4.5 AEROSOL RESPIRATORY (INHALATION) at 09:49

## 2017-11-09 RX ADMIN — Medication 325 MILLIGRAM(S): at 09:30

## 2017-11-09 RX ADMIN — Medication 500 MICROGRAM(S): at 16:35

## 2017-11-09 RX ADMIN — Medication 325 MILLIGRAM(S): at 12:23

## 2017-11-09 RX ADMIN — DIVALPROEX SODIUM 500 MILLIGRAM(S): 500 TABLET, DELAYED RELEASE ORAL at 05:53

## 2017-11-09 RX ADMIN — Medication 500 MICROGRAM(S): at 04:33

## 2017-11-09 RX ADMIN — LEVALBUTEROL 0.63 MILLIGRAM(S): 1.25 SOLUTION, CONCENTRATE RESPIRATORY (INHALATION) at 22:06

## 2017-11-09 RX ADMIN — Medication 325 MILLIGRAM(S): at 17:56

## 2017-11-09 RX ADMIN — OXYCODONE HYDROCHLORIDE 10 MILLIGRAM(S): 5 TABLET ORAL at 22:42

## 2017-11-09 RX ADMIN — Medication 500 MICROGRAM(S): at 22:06

## 2017-11-09 RX ADMIN — DIVALPROEX SODIUM 500 MILLIGRAM(S): 500 TABLET, DELAYED RELEASE ORAL at 17:56

## 2017-11-09 RX ADMIN — Medication 1 MILLIGRAM(S): at 12:23

## 2017-11-09 RX ADMIN — LEVALBUTEROL 0.63 MILLIGRAM(S): 1.25 SOLUTION, CONCENTRATE RESPIRATORY (INHALATION) at 11:19

## 2017-11-09 RX ADMIN — Medication 20 MILLIGRAM(S): at 05:53

## 2017-11-09 RX ADMIN — LEVALBUTEROL 0.63 MILLIGRAM(S): 1.25 SOLUTION, CONCENTRATE RESPIRATORY (INHALATION) at 16:35

## 2017-11-09 RX ADMIN — LIDOCAINE 1 PATCH: 4 CREAM TOPICAL at 12:23

## 2017-11-09 RX ADMIN — PANTOPRAZOLE SODIUM 40 MILLIGRAM(S): 20 TABLET, DELAYED RELEASE ORAL at 05:53

## 2017-11-09 RX ADMIN — Medication 500 MICROGRAM(S): at 11:19

## 2017-11-09 NOTE — H&P ADULT - PSH
History of thoracotomy  9/24/17 Left thoracotomy, Left upper lobectomy   9/25/17 Reop left thoracotomy, evacuation of left hemothorax

## 2017-11-09 NOTE — PROGRESS NOTE ADULT - SUBJECTIVE AND OBJECTIVE BOX
Subjective: Pt still c/o intermittent hemoptysis    Vital Signs:  Vital Signs Last 24 Hrs  T(C): 36.7 (11-10-17 @ 00:20), Max: 36.9 (11-09-17 @ 05:50)  T(F): 98 (11-10-17 @ 00:20), Max: 98.5 (11-09-17 @ 05:50)  HR: 93 (11-10-17 @ 00:20) (78 - 98)  BP: 103/67 (11-10-17 @ 00:20) (103/67 - 121/80)  RR: 18 (11-10-17 @ 00:20) (18 - 18)  SpO2: 100% (11-10-17 @ 00:20) (97% - 100%) on (O2)    Telemetry/Alarms:  General: WN/WD NAD  Neurology: Awake, nonfocal, BETH x 4  Eyes: Scleras clear, PERRLA/ EOMI, Gross vision intact  ENT:Gross hearing intact, grossly patent pharynx, no stridor  Neck: Neck supple, trachea midline, No JVD,   Respiratory: CTA B/L, No wheezing, rales, rhonchi  CV: RRR, S1S2, no murmurs, rubs or gallops  Abdominal: Soft, NT, ND +BS,   Extremities: No edema, + peripheral pulses  Skin: No Rashes, Hematoma, Ecchymosis  Lymphatic: No Neck, axilla, groin LAD  Psych: Oriented x 3, normal affect    Relevant labs, radiology and Medications reviewed                        10.8   12.47 )-----------( 228      ( 09 Nov 2017 06:16 )             33.7     11-09    142  |  101  |  19  ----------------------------<  97  4.8   |  34<H>  |  0.84    Ca    8.6      09 Nov 2017 06:16    TPro  6.5  /  Alb  3.6  /  TBili  < 0.2<L>  /  DBili  x   /  AST  9   /  ALT  13  /  AlkPhos  89  11-08      MEDICATIONS  (STANDING):  atorvastatin 80 milliGRAM(s) Oral at bedtime  buDESOnide 160 MICROgram(s)/formoterol 4.5 MICROgram(s) Inhaler 2 Puff(s) Inhalation two times a day  diVALproex  milliGRAM(s) Oral two times a day  docusate sodium 100 milliGRAM(s) Oral two times a day  ferrous    sulfate 325 milliGRAM(s) Oral three times a day with meals  folic acid 1 milliGRAM(s) Oral daily  ipratropium    for Nebulization 500 MICROGram(s) Nebulizer every 6 hours  levalbuterol Inhalation 0.63 milliGRAM(s) Inhalation every 6 hours  lidocaine   Patch 1 Patch Transdermal daily  pantoprazole    Tablet 40 milliGRAM(s) Oral before breakfast  polyethylene glycol 3350 17 Gram(s) Oral at bedtime  predniSONE   Tablet 20 milliGRAM(s) Oral daily    MEDICATIONS  (PRN):  acetaminophen   Tablet 650 milliGRAM(s) Oral every 6 hours PRN Mild Pain (1 - 3)  oxyCODONE    IR 10 milliGRAM(s) Oral every 4 hours PRN Severe Pain (7 - 10)  oxyCODONE    IR 5 milliGRAM(s) Oral every 4 hours PRN Moderate Pain (4 - 6)    Pertinent Physical Exam  I&O's Summary    08 Nov 2017 07:01  -  09 Nov 2017 07:00  --------------------------------------------------------  IN: 0 mL / OUT: 350 mL / NET: -350 mL    09 Nov 2017 07:01  -  10 Nov 2017 01:39  --------------------------------------------------------  IN: 0 mL / OUT: 1080 mL / NET: -1080 mL        Assessment  s/p L VATS, thoracotomy, DAVID Lobectomy POD 45 and then reop for L Hemothorax evacuation POD 45  h/o sarcoid    PLAN  Neuro: Pain management; Neuro consult  Pulm: Encourage coughing, deep breathing and use of incentive spirometry. CTA chest to evaluate hemoptysis; Daily CXR; monitor for worsening hemoptysis  Cardio: Monitor telemetry/alarms; Consult for PFO repair and continued anticoagulation (Eliquis)  GI: Tolerating diet.   Vasc:  DVT prophylaxis  Heme: Stable H/H. .   ID:  Stable.  Therapy: OOB/ambulate    Disposition: Aim to D/C to home once issues resolve  Discussed with Cardiothoracic Team at AM rounds.

## 2017-11-09 NOTE — H&P ADULT - NSHPLABSRESULTS_GEN_ALL_CORE
(11-08 @ 22:00)                      10.8  12.15 )-----------( 268                 34.6    Neutrophils = 9.76 (80.3%)  Lymphocytes = 1.57 (12.9%)  Eosinophils = 0.00 (0.0%)  Basophils = 0.01 (0.1%)  Monocytes = 0.76 (6.3%)  Bands = --%    11-08    142  |  99  |  18  ----------------------------<  121<H>  5.1   |  36<H>  |  0.96    Ca    9.0      08 Nov 2017 22:00    TPro  6.5  /  Alb  3.6  /  TBili  < 0.2<L>  /  DBili  x   /  AST  9   /  ALT  13  /  AlkPhos  89  11-08          RVP:          Tox:

## 2017-11-09 NOTE — CONSULT NOTE ADULT - SUBJECTIVE AND OBJECTIVE BOX
IO have seen and examined the patient and reviewed the histroy:  Pt was recently discharged from the hospital after a prolonged stay following left upper lobectomy for hemoptysis He had developed pTX following surgery and had to be pleurodesed with talc : his PTX resolved and then he was dced: Now since yesterday he coughed  up blood three times and he was sent back from rehab for evaluation. Pulmnary called : Pt has been on eliquis for all this time since dc: it was dced last evening  Patient is a 47y old  Male who presents with a chief complaint of hemoptysis (09 Nov 2017 00:32)      HPI:  47 year old male with a PMHx of sarcoidosis, HTN, asthma, and COPD presents to the ED with severe leg spasms and headache leading to a presyncopal episode, found to have a significant abnormal EKG and moderate inspiratory and expiratory wheezing in the setting of Entero/Rhino virus, admitted to tele for Near Syncope, r/o ACS and COPD exacerbation.  Hospital course complicated by stroke and PAF- AC on hold due to hemoptysis; 9/21-FB/BAL done by Dr. Arias. Intraop: Bleeding from DAVID (non-lingular)-Recom IR to embolize; 9/22 CT angio done per IR Lobko request.  Formal PFTS and ask Card to fix PFO now to decrease blood flow; 9/24- Developed worsening hemoptysis. Emergently kristel to OR and had  Left thoractomy DAVID. Brought to CTICU.  9/24: 1400ml EBL in OR; 10 u PRBCs;9/25-Brought back to OR for bleeding. Had Reop Thoracotomy, evac left hemothorax. CTI- DAVID space despite -40 Sxn, MEYER, L Hemiparesis (CVA) better w walking; Patient post op with prolongd air leak shila ptx, bedside pleurodesis done.  Developed SIRS after pleurodesis-> CTICU; On pressors. Pt. clinically improved in CTICU. Maintained on Steroids, weaned down to Solumedrol BID. Off pressors and transitioned to Midodrine. Still with thrombocytopenia but no bleeding. Continued on Eliquis but no ASA for h/o CVA. Neuro deficits improving.  Air leak resolved on 10/27. 10/30 Chest tube removed, cxr stable.  Steriods to po taper.  Patient stable for discharge.  11/2-Pt ambulating w PT frequently. Feels well. Pain controlled. All labs and vital signs stable. Wound healing. Arnold po steroid taper. To complete Voriconazole on Nov 4th. CXRs and reports reviewed by and cleared by Dr. Caldera. Pt. to fu for PFO closure as output. Still requires intermittent oxygen. Cleared for discharge by Dr. Caldera to acute rehab.    Patient presented to [facility] after rehab send patient with hemoptysis, patient had 2 episodes of hemoptysis, less than 10cc in total for 24 hours.  Patient states it happens when he coughs, blood tinged sputum.  Patient otherwise doing well in rehab. (09 Nov 2017 00:32)      ?FOLLOWING PRESENT  [x ] Hx of PE/DVT, [ y] Hx COPD, [ x] Hx of Asthma, [y ] Hx of Hospitalization, [ x]  Hx of BiPAP/CPAP use, [x ] Hx of AURELIA    Allergies    No Known Allergies    Intolerances        PAST MEDICAL & SURGICAL HISTORY:  History of pneumothorax: History of 3 prior pneumonthoracies.  COPD (chronic obstructive pulmonary disease)  Asthma  Hypertension  Sarcoidosis  History of thoracotomy: 9/24/17 Left thoracotomy, Left upper lobectomy   9/25/17 Reop left thoracotomy, evacuation of left hemothorax      FAMILY HISTORY:  Family history of pancreatic cancer  Family history of essential hypertension (Father)      Social History: [ x ] TOBACCO                  [ x ] ETOH                                 [ x ] IVDA/DRUGS    REVIEW OF SYSTEMS      General:	x    Skin/Breast:x  	  Ophthalmologic:x  	  ENMT:	x    Respiratory and Thorax: wheezing ; hemoptysis  	  Cardiovascular:	x    Gastrointestinal:	x    Genitourinary:	x    Musculoskeletal:	x    Neurological:	x    Psychiatric:	x    Hematology/Lymphatics:	x    Endocrine:	x    Allergic/Immunologic:	x    MEDICATIONS  (STANDING):  atorvastatin 80 milliGRAM(s) Oral at bedtime  buDESOnide 160 MICROgram(s)/formoterol 4.5 MICROgram(s) Inhaler 2 Puff(s) Inhalation two times a day  diVALproex  milliGRAM(s) Oral two times a day  docusate sodium 100 milliGRAM(s) Oral two times a day  ferrous    sulfate 325 milliGRAM(s) Oral three times a day with meals  folic acid 1 milliGRAM(s) Oral daily  ipratropium    for Nebulization 500 MICROGram(s) Nebulizer every 6 hours  levalbuterol Inhalation 0.63 milliGRAM(s) Inhalation every 6 hours  lidocaine   Patch 1 Patch Transdermal daily  pantoprazole    Tablet 40 milliGRAM(s) Oral before breakfast  polyethylene glycol 3350 17 Gram(s) Oral at bedtime  predniSONE   Tablet 20 milliGRAM(s) Oral daily    MEDICATIONS  (PRN):  acetaminophen   Tablet 650 milliGRAM(s) Oral every 6 hours PRN Mild Pain (1 - 3)  oxyCODONE    IR 10 milliGRAM(s) Oral every 4 hours PRN Severe Pain (7 - 10)  oxyCODONE    IR 5 milliGRAM(s) Oral every 4 hours PRN Moderate Pain (4 - 6)       Vital Signs Last 24 Hrs  T(C): 36.7 (09 Nov 2017 12:20), Max: 36.9 (09 Nov 2017 05:50)  T(F): 98.1 (09 Nov 2017 12:20), Max: 98.5 (09 Nov 2017 05:50)  HR: 81 (09 Nov 2017 12:20) (78 - 98)  BP: 114/74 (09 Nov 2017 12:20) (106/65 - 128/81)  BP(mean): --  RR: 18 (09 Nov 2017 12:20) (16 - 18)  SpO2: 100% (09 Nov 2017 12:20) (95% - 100%)        I&O's Summary    08 Nov 2017 07:01  -  09 Nov 2017 07:00  --------------------------------------------------------  IN: 0 mL / OUT: 350 mL / NET: -350 mL    09 Nov 2017 07:01  -  09 Nov 2017 14:15  --------------------------------------------------------  IN: 0 mL / OUT: 380 mL / NET: -380 mL        Physical Exam:   GENERAL: NAD, well-groomed, well-developed  HEENT: GIOVANI/   Atraumatic, Normocephalic  ENMT: No tonsillar erythema, exudates, or enlargement; Moist mucous membranes, Good dentition, No lesions  NECK: Supple, No JVD, Normal thyroid  CHEST/LUNG: mild wheezing+  CVS: Regular rate and rhythm; No murmurs, rubs, or gallops  GI: : Soft, Nontender, Nondistended; Bowel sounds present  NERVOUS SYSTEM:  Alert & Oriented X3  EXTREMITIES:  2+ Peripheral Pulses, No clubbing, cyanosis, or edema  LYMPH: No lymphadenopathy noted  SKIN: No rashes or lesions  ENDOCRINOLOGY: No Thyromegaly  PSYCH: Appropriate    Labs:                              10.8   12.47 )-----------( 228      ( 09 Nov 2017 06:16 )             33.7                         10.8   12.15 )-----------( 268      ( 08 Nov 2017 22:00 )             34.6     11-09    142  |  101  |  19  ----------------------------<  97  4.8   |  34<H>  |  0.84  11-08    142  |  99  |  18  ----------------------------<  121<H>  5.1   |  36<H>  |  0.96    Ca    8.6      09 Nov 2017 06:16  Ca    9.0      08 Nov 2017 22:00    TPro  6.5  /  Alb  3.6  /  TBili  < 0.2<L>  /  DBili  x   /  AST  9   /  ALT  13  /  AlkPhos  89  11-08    CAPILLARY BLOOD GLUCOSE        LIVER FUNCTIONS - ( 08 Nov 2017 22:00 )  Alb: 3.6 g/dL / Pro: 6.5 g/dL / ALK PHOS: 89 u/L / ALT: 13 u/L / AST: 9 u/L / GGT: x               D DImer    Cultures:         < from: Xray Chest 1 View AP -PORTABLE-Routine (11.09.17 @ 07:44) >  Bilateral bullous change with large left upper lobe bulla again noted.   Chain sutures are in noted in the left upper lung. Bilateral lung   opacities which may be related to architectural distortion are again   noted. Continued tenting of the right hemidiaphragm with blunting of the   right costophrenic angle. Small left pleural effusion as seen on the CT   scan again noted. No definite pneumothorax seen, though evaluation is   limited by the bullous disease present. The heart is not enlarged.    IMPRESSION:  Stable chest x-ray.    Small left pleural effusion unchanged.    Unchanged bilateral lung opacities which may be related to architectural   distortion.    Bilateral bullous change with large left upper lobe bulla again noted.   This makes evaluation for pneumothorax difficult. No gross pneumothorax   identified.              DOREEN TEIXEIRA M.D., RADIOLOGY RESIDENT  This document has been electronically signed.  KEERTHI FIELDS M.D., ATTENDING RADIOLOGIST  This document has been electronically signed. Nov 9 2017 11:42AM    < end of copied text >      < from: CT Angio Chest w/ IV Cont (09.22.17 @ 13:20) >  roc.    MEDIASTINUM: No large mediastinal lymph nodes. Hypertrophic intracostal   arteries are seen in the mediastinum especially the left intercostal   artery .    HEART and VESSELS: Normal heart size. No pericardial effusion. Thoracic   aorta and main pulmonary artery are normal in caliber.     IMAGED ABDOMEN: Unremarkable.     SOFT TISSUES: No large axillary lymph nodes.    BONES: Unremarkable.      IMPRESSION:    Marked architectural distortion with cystic changes and traction   bronchiectasis in both upper lobes secondary to end-stage sarcoidosis.  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   Prominent intercostal arteries especially on the left side especially on   the left side with no evidence of active bleed.                  JULIETTE SMITH M.D., ATTENDING RADIOLOGIST    < end of copied text >                Studies  Chest X-RAY  CT SCAN Chest   CT Abdomen  Venous Dopplers: LE:   Others

## 2017-11-09 NOTE — PHYSICAL THERAPY INITIAL EVALUATION ADULT - ACTIVE RANGE OF MOTION EXAMINATION, REHAB EVAL
except L shoulder flexion 0- 80 degrees/bilateral upper extremity Active ROM was WFL (within functional limits)/bilateral  lower extremity Active ROM was WFL (within functional limits)

## 2017-11-09 NOTE — H&P ADULT - NSHPPHYSICALEXAM_GEN_ALL_CORE
General: WN/WD NAD  Neurology: A&Ox3, nonfocal, BETH x 4  Eyes: PERRLA/ EOMI, Gross vision intact  ENT/Neck: Neck supple, trachea midline, No JVD, Gross hearing intact  Respiratory: CTA B/L, No wheezing, rales, rhonchi  CV: RRR, S1S2, no murmurs, rubs or gallops  Abdominal: Soft, NT, ND +BS,   Extremities: No edema, + peripheral pulses  Skin: No Rashes, Hematoma, Ecchymosis  Incisions: healing

## 2017-11-09 NOTE — H&P ADULT - NSHPREVIEWOFSYSTEMS_GEN_ALL_CORE
REVIEW OF SYSTEMS      General:No Weight change/ Fatigue/ HA/Dizzy	    Skin/Breast: No Rashes/ Lesions/ Masses  	  Ophthalmologic: No Blurry vision/ Glaucoma/ Blindness  	  ENMT: No Hearing loss/ Drainage/ Lesions	    Respiratory and Thorax: + hemoptysis, denies shortness of breath  	  Cardiovascular: No Chest pain/ Palpitations/ Diaphoresis	    Gastrointestinal: No Nausea/ Vomiting/ Constipation/ Appetite Change	    Genitourinary: No Heamturia/ Dysuria/ Frequency change/ Impotence	    Musculoskeletal: No Pain/ Weakness/ Claudication	    Neurological: No Seizures/ Parastesias	    Psychiatric: No Dementia/ Depression/ SI/HI	    Hematology/Lymphatics: No hx of bleeding/ Edema	    Endocrine:	No Hyperglycemia/ Hypoglycemia    Allergic/Immunologic:	 No Anaphylaxis/ Intolerance/ Recent illnesses

## 2017-11-09 NOTE — H&P ADULT - HISTORY OF PRESENT ILLNESS
47 year old male with a PMHx of sarcoidosis, HTN, asthma, and COPD presents to the ED with severe leg spasms and headache leading to a presyncopal episode, found to have a significant abnormal EKG and moderate inspiratory and expiratory wheezing in the setting of Entero/Rhino virus, admitted to [facility] for Near Syncope, r/o ACS and COPD exacerbation.  Hospital course complicated by stroke and PAF- AC on hold due to hemoptysis; 9/21-FB/BAL done by Dr. Arias. Intraop: Bleeding from DAVID (non-lingular)-Recom IR to embolize; 9/22 CT angio done per IR Lobko request.  Formal PFTS and ask Card to fix PFO now to decrease blood flow; 9/24- Developed worsening hemoptysis. Emergently kristel to OR and had  Left thoractomy DAVID. Brought to CTICU.  9/24: 1400ml EBL in OR; 10 u PRBCs;9/25-Brought back to OR for bleeding. Had Reop Thoracotomy, evac left hemothorax. CTI- DAVID space despite -40 Sxn, MEYER, L Hemiparesis (CVA) better w walking; Patient post op with prolongd air leak shila ptx, bedside pleurodesis done.  Developed SIRS after pleurodesis-> CTICU; On pressors. Pt. clinically improved in CTICU. Maintained on Steroids, weaned down to Solumedrol BID. Off pressors and transitioned to Midodrine. Still with thrombocytopenia but no bleeding. Continued on Eliquis but no ASA for h/o CVA. Neuro deficits improving.  Air leak resolved on 10/27. 10/30 Chest tube removed, cxr stable.  Steriods to po taper.  Patient stable for discharge.  11/2-Pt ambulating w PT frequently. Feels well. Pain controlled. All labs and vital signs stable. Wound healing. Arnold po steroid taper. To complete Voriconazole on Nov 4th. CXRs and reports reviewed by and cleared by Dr. Caldera. Pt. to fu for PFO closure as output. Still requires intermittent oxygen. Cleared for discharge by Dr. Caldera to acute rehab.    Patient presented to [place] ER after rehab send patient with hemoptysis, patient had 2 episodes of hemoptysis, less than 10cc in total for 24 hours.  Patient states it happens when he coughs, blood tinged sputum.  Patient otherwise doing well in rehab.

## 2017-11-09 NOTE — CONSULT NOTE ADULT - ASSESSMENT
47 year old male with hemoptysis : with multile comorbidotoes: Has been on eliquis for hx of cva and has PFO

## 2017-11-10 DIAGNOSIS — I63.9 CEREBRAL INFARCTION, UNSPECIFIED: ICD-10-CM

## 2017-11-10 LAB
APTT BLD: 27.3 SEC — LOW (ref 27.5–37.4)
BLD GP AB SCN SERPL QL: NEGATIVE — SIGNIFICANT CHANGE UP
BUN SERPL-MCNC: 19 MG/DL — SIGNIFICANT CHANGE UP (ref 7–23)
CALCIUM SERPL-MCNC: 8.8 MG/DL — SIGNIFICANT CHANGE UP (ref 8.4–10.5)
CHLORIDE SERPL-SCNC: 101 MMOL/L — SIGNIFICANT CHANGE UP (ref 98–107)
CO2 SERPL-SCNC: 30 MMOL/L — SIGNIFICANT CHANGE UP (ref 22–31)
CREAT SERPL-MCNC: 0.81 MG/DL — SIGNIFICANT CHANGE UP (ref 0.5–1.3)
GLUCOSE SERPL-MCNC: 77 MG/DL — SIGNIFICANT CHANGE UP (ref 70–99)
HCT VFR BLD CALC: 34.2 % — LOW (ref 39–50)
HGB BLD-MCNC: 10.6 G/DL — LOW (ref 13–17)
INR BLD: 1.15 — SIGNIFICANT CHANGE UP (ref 0.88–1.17)
MCHC RBC-ENTMCNC: 31 % — LOW (ref 32–36)
MCHC RBC-ENTMCNC: 31.3 PG — SIGNIFICANT CHANGE UP (ref 27–34)
MCV RBC AUTO: 100.9 FL — HIGH (ref 80–100)
NRBC # FLD: 0 — SIGNIFICANT CHANGE UP
PLATELET # BLD AUTO: 213 K/UL — SIGNIFICANT CHANGE UP (ref 150–400)
PMV BLD: 13 FL — SIGNIFICANT CHANGE UP (ref 7–13)
POTASSIUM SERPL-MCNC: 4.6 MMOL/L — SIGNIFICANT CHANGE UP (ref 3.5–5.3)
POTASSIUM SERPL-SCNC: 4.6 MMOL/L — SIGNIFICANT CHANGE UP (ref 3.5–5.3)
PROTHROM AB SERPL-ACNC: 12.9 SEC — SIGNIFICANT CHANGE UP (ref 9.8–13.1)
RBC # BLD: 3.39 M/UL — LOW (ref 4.2–5.8)
RBC # FLD: 15.9 % — HIGH (ref 10.3–14.5)
RH IG SCN BLD-IMP: POSITIVE — SIGNIFICANT CHANGE UP
SODIUM SERPL-SCNC: 143 MMOL/L — SIGNIFICANT CHANGE UP (ref 135–145)
WBC # BLD: 9.22 K/UL — SIGNIFICANT CHANGE UP (ref 3.8–10.5)
WBC # FLD AUTO: 9.22 K/UL — SIGNIFICANT CHANGE UP (ref 3.8–10.5)

## 2017-11-10 PROCEDURE — 71010: CPT | Mod: 26

## 2017-11-10 PROCEDURE — 71275 CT ANGIOGRAPHY CHEST: CPT | Mod: 26

## 2017-11-10 PROCEDURE — 99222 1ST HOSP IP/OBS MODERATE 55: CPT

## 2017-11-10 RX ADMIN — Medication 325 MILLIGRAM(S): at 07:54

## 2017-11-10 RX ADMIN — LEVALBUTEROL 0.63 MILLIGRAM(S): 1.25 SOLUTION, CONCENTRATE RESPIRATORY (INHALATION) at 11:37

## 2017-11-10 RX ADMIN — LEVALBUTEROL 0.63 MILLIGRAM(S): 1.25 SOLUTION, CONCENTRATE RESPIRATORY (INHALATION) at 21:00

## 2017-11-10 RX ADMIN — LEVALBUTEROL 0.63 MILLIGRAM(S): 1.25 SOLUTION, CONCENTRATE RESPIRATORY (INHALATION) at 04:42

## 2017-11-10 RX ADMIN — ATORVASTATIN CALCIUM 80 MILLIGRAM(S): 80 TABLET, FILM COATED ORAL at 21:15

## 2017-11-10 RX ADMIN — LEVALBUTEROL 0.63 MILLIGRAM(S): 1.25 SOLUTION, CONCENTRATE RESPIRATORY (INHALATION) at 16:24

## 2017-11-10 RX ADMIN — Medication 500 MICROGRAM(S): at 16:24

## 2017-11-10 RX ADMIN — Medication 500 MICROGRAM(S): at 11:37

## 2017-11-10 RX ADMIN — Medication 500 MICROGRAM(S): at 21:00

## 2017-11-10 RX ADMIN — BUDESONIDE AND FORMOTEROL FUMARATE DIHYDRATE 2 PUFF(S): 160; 4.5 AEROSOL RESPIRATORY (INHALATION) at 21:15

## 2017-11-10 RX ADMIN — Medication 325 MILLIGRAM(S): at 12:08

## 2017-11-10 RX ADMIN — Medication 500 MICROGRAM(S): at 04:42

## 2017-11-10 RX ADMIN — Medication 1 MILLIGRAM(S): at 12:08

## 2017-11-10 RX ADMIN — PANTOPRAZOLE SODIUM 40 MILLIGRAM(S): 20 TABLET, DELAYED RELEASE ORAL at 05:21

## 2017-11-10 RX ADMIN — DIVALPROEX SODIUM 500 MILLIGRAM(S): 500 TABLET, DELAYED RELEASE ORAL at 05:21

## 2017-11-10 RX ADMIN — Medication 650 MILLIGRAM(S): at 07:54

## 2017-11-10 RX ADMIN — Medication 100 MILLIGRAM(S): at 17:14

## 2017-11-10 RX ADMIN — Medication 325 MILLIGRAM(S): at 17:14

## 2017-11-10 RX ADMIN — LIDOCAINE 1 PATCH: 4 CREAM TOPICAL at 12:08

## 2017-11-10 RX ADMIN — LIDOCAINE 1 PATCH: 4 CREAM TOPICAL at 00:59

## 2017-11-10 RX ADMIN — DIVALPROEX SODIUM 500 MILLIGRAM(S): 500 TABLET, DELAYED RELEASE ORAL at 17:14

## 2017-11-10 RX ADMIN — OXYCODONE HYDROCHLORIDE 5 MILLIGRAM(S): 5 TABLET ORAL at 18:36

## 2017-11-10 RX ADMIN — Medication 20 MILLIGRAM(S): at 05:21

## 2017-11-10 RX ADMIN — OXYCODONE HYDROCHLORIDE 5 MILLIGRAM(S): 5 TABLET ORAL at 19:18

## 2017-11-10 NOTE — PROGRESS NOTE ADULT - SUBJECTIVE AND OBJECTIVE BOX
Patient is a 47y old  Male who presents with a chief complaint of hemoptysis (09 Nov 2017 00:32)    Pertinent ROS: Pt seen and examined around 8AM. anxious. denies SOB, cough (+), hemoptysis (+)    MEDICATIONS  (STANDING):  atorvastatin 80 milliGRAM(s) Oral at bedtime  buDESOnide 160 MICROgram(s)/formoterol 4.5 MICROgram(s) Inhaler 2 Puff(s) Inhalation two times a day  diVALproex  milliGRAM(s) Oral two times a day  docusate sodium 100 milliGRAM(s) Oral two times a day  ferrous    sulfate 325 milliGRAM(s) Oral three times a day with meals  folic acid 1 milliGRAM(s) Oral daily  ipratropium    for Nebulization 500 MICROGram(s) Nebulizer every 6 hours  levalbuterol Inhalation 0.63 milliGRAM(s) Inhalation every 6 hours  lidocaine   Patch 1 Patch Transdermal daily  pantoprazole    Tablet 40 milliGRAM(s) Oral before breakfast  polyethylene glycol 3350 17 Gram(s) Oral at bedtime  predniSONE   Tablet 20 milliGRAM(s) Oral daily    MEDICATIONS  (PRN):  acetaminophen   Tablet 650 milliGRAM(s) Oral every 6 hours PRN Mild Pain (1 - 3)  oxyCODONE    IR 10 milliGRAM(s) Oral every 4 hours PRN Severe Pain (7 - 10)  oxyCODONE    IR 5 milliGRAM(s) Oral every 4 hours PRN Moderate Pain (4 - 6)    Vital Signs Last 24 Hrs  T(C): 37 (10 Nov 2017 16:17), Max: 37.1 (10 Nov 2017 10:16)  T(F): 98.6 (10 Nov 2017 16:17), Max: 98.7 (10 Nov 2017 10:16)  HR: 93 (10 Nov 2017 16:24) (88 - 108)  BP: 113/83 (10 Nov 2017 16:17) (103/67 - 136/86)  BP(mean): --  RR: 18 (10 Nov 2017 16:17) (18 - 18)  SpO2: 100% (10 Nov 2017 16:24) (97% - 100%)    I&O's Summary    09 Nov 2017 07:01  -  10 Nov 2017 07:00  --------------------------------------------------------  IN: 0 mL / OUT: 1080 mL / NET: -1080 mL    10 Nov 2017 07:01  -  10 Nov 2017 16:52  --------------------------------------------------------  IN: 0 mL / OUT: 400 mL / NET: -400 mL    Physical Exam:   GENERAL: NAD, well-groomed, well-developed  HEENT: GIOVANI/   Atraumatic, Normocephalic  NECK: Supple, No JVD, Normal thyroid  CHEST/LUNG: mild wheezing+, diminished  CVS: Regular rate and rhythm; No murmurs, rubs, or gallops  GI: : Soft, Nontender, Nondistended; Bowel sounds present  NERVOUS SYSTEM:  Alert & Oriented X3  EXTREMITIES:  2+ Peripheral Pulses, No clubbing, cyanosis, or edema  SKIN: Normal color, turgor. Dry   PSYCH: Appropriate  Labs:                          10.6   9.22  )-----------( 213      ( 10 Nov 2017 05:24 )             34.2     11-10    143  |  101  |  19  ----------------------------<  77  4.6   |  30  |  0.81    Ca    8.8      10 Nov 2017 05:24    TPro  6.5  /  Alb  3.6  /  TBili  < 0.2<L>  /  DBili  x   /  AST  9   /  ALT  13  /  AlkPhos  89  11-08    CAPILLARY BLOOD GLUCOSE        LIVER FUNCTIONS - ( 08 Nov 2017 22:00 )  Alb: 3.6 g/dL / Pro: 6.5 g/dL / ALK PHOS: 89 u/L / ALT: 13 u/L / AST: 9 u/L / GGT: x           PT/INR - ( 10 Nov 2017 13:17 )   PT: 12.9 SEC;   INR: 1.15          PTT - ( 10 Nov 2017 13:17 )  PTT:27.3 SEC    D DImer  Cultures:       Studies  Chest X-RAY < from: Xray Chest 1 View AP -PORTABLE-Routine (11.09.17 @ 07:44) >  EXAM:  RAD CHEST PORTABLE ROUTINE        PROCEDURE DATE:  Nov 9 2017         INTERPRETATION:  CLINICAL INDICATION: Hemoptysis.    TECHNIQUE: Frontal radiograph of the chest.    COMPARISON: AP portable  Radiograph of the chest from 11/8/2017. CT scan   of the chest from November 3, 2017.    FINDINGS:    Bilateral bullous change with large left upper lobe bulla again noted.   Chain sutures are in noted in the left upper lung. Bilateral lung   opacities which may be related to architectural distortion are again   noted. Continued tenting of the right hemidiaphragm with blunting of the   right costophrenic angle. Small left pleural effusion as seen on the CT   scan again noted. No definite pneumothorax seen, though evaluation is   limited by the bullous disease present. The heart is not enlarged.    IMPRESSION:  Stable chest x-ray.    Small left pleural effusion unchanged.    Unchanged bilateral lung opacities which may be related to architectural   distortion.    Bilateral bullous change with large left upper lobe bulla again noted.   This makes evaluation for pneumothorax difficult. No gross pneumothorax   identified.    < end of copied text >    CT SCAN Chest  < from: CT Angio Chest w/ IV Cont (11.10.17 @ 11:10) >    EXAM:  CT ANGIO CHEST (W)AW IC        PROCEDURE DATE:  Nov 10 2017         INTERPRETATION:  CLINICAL INFORMATION: Hemoptysis, history of sarcoid and   aspergilloma, status post left upper lobectomy    COMPARISON: CT chest 11/3/2017    PROCEDURE:   CT Angiography of the Chest.  90 ml of Omnipaque 350 was injected intravenously. 10 ml were discarded.  Sagittal and coronal reformats were performed as well as MIPS.    FINDINGS:    CHEST:     PULMONARY ARTERY: Suboptimal study of the pulmonary artery and its distal   branches. No definite pulmonary embolism. The pulmonary artery is   enlarged, measuring 3.7 cm. No active pulmonary hemorrhage.  LUNGS AND LARGE AIRWAYS: Status post left upper lobe lobectomy. Unchanged   patchy peribronchial opacities, predominantly in the right upper lobe.   Unchanged cystic changes of the lungs bilaterally.  PLEURA: Unchanged left pneumothorax. Small left pleural effusion of mixed   density.  VESSELS: Normal left-sided aortic arch and descending aorta. No aneurysm.   No atherosclerotic changes.  HEART: Heart size is normal. Trace pericardial effusion.   MEDIASTINUM AND ERIC: No lymphadenopathy.  CHEST WALL AND LOWER NECK: Within normal limits.  VISUALIZED UPPER ABDOMEN: Bilateral nephrolithiasis without   hydronephrosis.  BONES: Unchanged left seventh rib thoracotomy defect. Mild degenerative   changes of the spine.    IMPRESSION: No active pulmonary hemorrhage.    Unchanged left pneumothorax.    Unchanged patchy peribronchial opacities and cystic changes, possibly   related to patient's known sarcoidosis.    Unchanged small left pleural effusion mixed density, possible element of   proteinaceous or hemorrhagic fluid.    < end of copied text >    CT Abdomen  Venous Dopplers: LE:   Others

## 2017-11-10 NOTE — CONSULT NOTE ADULT - ASSESSMENT
46 yo male with complicated hospital stay now presents with hemoptysis. MRI last month showed subacute stroke vs sarcoidosis. LEft sided weakness is improving NIHSS 4 MRS 2.

## 2017-11-10 NOTE — CONSULT NOTE ADULT - ASSESSMENT
46 y/o M PMx Impression: 46 y/o M PMHx sarcoid, HTN, COPD, and asthma initially admitted almost 2 months ago for treatment of COPD exacerbation. Patient subsequently had a prolonged hospital stay c/b MICU admission requiring pressors, CVA in setting of newly diagnosed PFO, AF requiring initiation of Eliquis therapy, aspergilloma requiring voriconazole, DAVID lobectomy for management of hemoptysis c/b recurrent hemothorax requiring chemical pleurodesis. Etiology of hemoptysis includes iatrogenic effect 2/2 Eliquis (especially in light of recent voriconazole, which can prolong anticoagulant effects of Eliquis), vs. 2/2 architectural disturbances Impression: 46 y/o M PMHx sarcoid, HTN, COPD, and asthma initially admitted almost 2 months ago for treatment of COPD exacerbation. Patient subsequently had a prolonged hospital stay c/b MICU admission requiring pressors, CVA in setting of newly diagnosed PFO, AF requiring initiation of Eliquis therapy, aspergilloma requiring voriconazole, DAVID lobectomy for management of hemoptysis c/b recurrent hemothorax requiring chemical pleurodesis. Etiology of hemoptysis includes iatrogenic effect 2/2 Eliquis (especially in light of recent voriconazole, which can prolong anticoagulant effects of Eliquis), vs. 2/2 architectural disturbances due to sarcoid, PE, or infectious etiology.     Recommendations:  - [id number] score 3 (HTN, h/o CVA) warrants anticoagulation  - HAS-BLED score 2 (h/o CVA, major bleeding [hemothorax], no uncontrolled HTN) indicates moderate risk of a/c  - Case to be d/w attending - please await cyrus Chandler MD  PGY-1 | Preliminary Resident  Department of Internal Medicine  Pager: [phone] Impression: 48 y/o M PMHx sarcoid, HTN, COPD, and asthma initially admitted almost 2 months ago for treatment of COPD exacerbation. Patient subsequently had a prolonged hospital stay c/b MICU admission requiring pressors, CVA in setting of newly diagnosed PFO, AF requiring initiation of Eliquis therapy, aspergilloma requiring voriconazole, DAVID lobectomy for management of hemoptysis c/b recurrent hemothorax requiring chemical pleurodesis. Etiology of hemoptysis includes iatrogenic effect 2/2 Eliquis (especially in light of recent voriconazole, which can prolong anticoagulant effects of Eliquis due to cytochrome p450 inhibition), vs. 2/2 architectural disturbances due to sarcoid, PE, or infectious etiology.     Recommendations:  - [id number]-[id number] score 3 (HTN, h/o CVA) warrants anticoagulation  - HAS-BLED score 2 (h/o CVA, major bleeding [hemothorax], no uncontrolled HTN) indicates moderate risk of a/c  - Case to be d/w attending - please await cyrus Chandler MD  PGY-1 | Preliminary Resident  Department of Internal Medicine  Pager: [phone] Impression: 48 y/o M PMHx sarcoid, HTN, COPD, and asthma initially admitted almost 2 months ago for treatment of COPD exacerbation. Patient subsequently had a prolonged hospital stay c/b MICU admission requiring pressors, CVA in setting of newly diagnosed PFO, AF requiring initiation of Eliquis therapy, aspergilloma requiring voriconazole, DAVID lobectomy for management of hemoptysis c/b recurrent hemothorax requiring chemical pleurodesis. Etiology of hemoptysis includes iatrogenic effect 2/2 Eliquis (especially in light of recent voriconazole, which can prolong anticoagulant effects of Eliquis due to cytochrome p450 inhibition), vs. 2/2 architectural disturbances due to sarcoid, PE, or infectious etiology.     Patient requires a/c in setting of h/o AFib and PMHx CVA and HTN.    Recommendations:  - [id number]-HNDt score 3 (HTN, h/o CVA) warrants anticoagulation.  - HAS-BLED score 2 (h/o CVA, major bleeding [hemothorax], no uncontrolled HTN) indicates moderate risk of a/c but benefits outweigh risks.  - Would recommend initiation of heparin gtt --> warfarin.  - Goal INR for warfarin 2.0-2.5. Would like to keep INR within lower part of therapeutic range in setting of patient's hemoptysis.   - Patient's CVA likely unrelated to PFO and would not recommend closure at this time.    Mikie Chandler MD  PGY-1 | Preliminary Resident  Department of Internal Medicine  Pager: [phone]

## 2017-11-10 NOTE — PROVIDER CONTACT NOTE (OTHER) - SITUATION
MRI called up to check on patient before transfer to MRI but patient denied MRI. Patient stated that he did not want to spend time inside machine. Patient became anxious when discussing MRI

## 2017-11-10 NOTE — PROGRESS NOTE ADULT - PROBLEM SELECTOR PLAN 1
? 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 ot from bronchiectasis? eliquis have been dced: needs card to se: In addition, he may need IR help with embolization, it his hemoptysis increases: He has had pleurodesis on the left side.  11/10 quantify hemoptysis. no AC. stable

## 2017-11-10 NOTE — PROVIDER CONTACT NOTE (OTHER) - ASSESSMENT
patient is AOX4 but seems to get very anxious in regards to any test that require him to leave the room.

## 2017-11-10 NOTE — PROGRESS NOTE ADULT - SUBJECTIVE AND OBJECTIVE BOX
Subjective: Pt with one episode of hemoptysis today.      PAST MEDICAL & SURGICAL HISTORY:  History of pneumothorax: History of 3 prior pneumonthoracies.  COPD (chronic obstructive pulmonary disease)  Asthma  Hypertension  Sarcoidosis  History of thoracotomy: 9/24/17 Left thoracotomy, Left upper lobectomy   9/25/17 Reop left thoracotomy, evacuation of left hemothorax    MEDICATIONS  (STANDING):  atorvastatin 80 milliGRAM(s) Oral at bedtime  buDESOnide 160 MICROgram(s)/formoterol 4.5 MICROgram(s) Inhaler 2 Puff(s) Inhalation two times a day  diVALproex  milliGRAM(s) Oral two times a day  docusate sodium 100 milliGRAM(s) Oral two times a day  ferrous    sulfate 325 milliGRAM(s) Oral three times a day with meals  folic acid 1 milliGRAM(s) Oral daily  ipratropium    for Nebulization 500 MICROGram(s) Nebulizer every 6 hours  levalbuterol Inhalation 0.63 milliGRAM(s) Inhalation every 6 hours  lidocaine   Patch 1 Patch Transdermal daily  pantoprazole    Tablet 40 milliGRAM(s) Oral before breakfast  polyethylene glycol 3350 17 Gram(s) Oral at bedtime  predniSONE   Tablet 20 milliGRAM(s) Oral daily    Vital Signs Last 24 Hrs  T(C): 37 (10 Nov 2017 19:35), Max: 37.1 (10 Nov 2017 10:16)  T(F): 98.6 (10 Nov 2017 19:35), Max: 98.7 (10 Nov 2017 10:16)  HR: 104 (10 Nov 2017 19:35) (88 - 108)  BP: 122/78 (10 Nov 2017 19:35) (103/67 - 136/86)  BP(mean): --  RR: 18 (10 Nov 2017 19:35) (18 - 18)  SpO2: 100% (10 Nov 2017 19:35) (97% - 100%)    I&O's Detail    09 Nov 2017 07:01  -  10 Nov 2017 07:00  --------------------------------------------------------  IN:  Total IN: 0 mL    OUT:    Voided: 1080 mL  Total OUT: 1080 mL    Total NET: -1080 mL      10 Nov 2017 07:01  -  10 Nov 2017 21:27  --------------------------------------------------------  IN:  Total IN: 0 mL    OUT:    Voided: 400 mL  Total OUT: 400 mL    Total NET: -400 mL  (11-10 @ 05:24)                      10.6  9.22 )-----------( 213                 34.2    Neutrophils = -- (--%)  Lymphocytes = -- (--%)  Eosinophils = -- (--%)  Basophils = -- (--%)  Monocytes = -- (--%)  Bands = --%    11-10    143  |  101  |  19  ----------------------------<  77  4.6   |  30  |  0.81    Ca    8.8      10 Nov 2017 05:24    TPro  6.5  /  Alb  3.6  /  TBili  < 0.2<L>  /  DBili  x   /  AST  9   /  ALT  13  /  AlkPhos  89  11-08    ( 10 Nov 2017 13:17 )   PT: 12.9 SEC;   INR: 1.15 ;       PTT:27.3 SEC      CTA: no active pulmonary hemorrhage, unchanged left pneumothorax, patchy peribronchial opacities and cystic changes, small left pleural effusion mixed density       General: WN/WD NAD  Neurology: A&Ox3, nonfocal, BETH x 4  Eyes: PERRLA/ EOMI, Gross vision intact  ENT/Neck: Neck supple, trachea midline, No JVD, Gross hearing intact  Respiratory: Decreased left side   CV: RRR, S1S2, no murmurs, rubs or gallops  Abdominal: Soft, NT, ND +BS,   Extremities: No edema, + peripheral pulses  Skin: No Rashes, Hematoma, Ecchymosis  Incisions: healing Subjective: Pt with one episode of hemoptysis today.      PAST MEDICAL & SURGICAL HISTORY:  History of pneumothorax: History of 3 prior pneumonthoracies.  COPD (chronic obstructive pulmonary disease)  Asthma  Hypertension  Sarcoidosis  History of thoracotomy: 9/24/17 Left thoracotomy, Left upper lobectomy   9/25/17 Reop left thoracotomy, evacuation of left hemothorax    MEDICATIONS  (STANDING):  atorvastatin 80 milliGRAM(s) Oral at bedtime  buDESOnide 160 MICROgram(s)/formoterol 4.5 MICROgram(s) Inhaler 2 Puff(s) Inhalation two times a day  diVALproex  milliGRAM(s) Oral two times a day   docusate sodium 100 milliGRAM(s) Oral two times a day  ferrous    sulfate 325 milliGRAM(s) Oral three times a day with meals  folic acid 1 milliGRAM(s) Oral daily  ipratropium    for Nebulization 500 MICROGram(s) Nebulizer every 6 hours  levalbuterol Inhalation 0.63 milliGRAM(s) Inhalation every 6 hours  lidocaine   Patch 1 Patch Transdermal daily  pantoprazole    Tablet 40 milliGRAM(s) Oral before breakfast  polyethylene glycol 3350 17 Gram(s) Oral at bedtime  predniSONE   Tablet 20 milliGRAM(s) Oral daily    Vital Signs Last 24 Hrs  T(C): 37 (10 Nov 2017 19:35), Max: 37.1 (10 Nov 2017 10:16)  T(F): 98.6 (10 Nov 2017 19:35), Max: 98.7 (10 Nov 2017 10:16)  HR: 104 (10 Nov 2017 19:35) (88 - 108)  BP: 122/78 (10 Nov 2017 19:35) (103/67 - 136/86)  BP(mean): --  RR: 18 (10 Nov 2017 19:35) (18 - 18)  SpO2: 100% (10 Nov 2017 19:35) (97% - 100%)    I&O's Detail    09 Nov 2017 07:01  -  10 Nov 2017 07:00  --------------------------------------------------------  IN:  Total IN: 0 mL    OUT:    Voided: 1080 mL  Total OUT: 1080 mL    Total NET: -1080 mL      10 Nov 2017 07:01  -  10 Nov 2017 21:27  --------------------------------------------------------  IN:  Total IN: 0 mL    OUT:    Voided: 400 mL  Total OUT: 400 mL    Total NET: -400 mL  (11-10 @ 05:24)                      10.6  9.22 )-----------( 213                 34.2    Neutrophils = -- (--%)  Lymphocytes = -- (--%)  Eosinophils = -- (--%)  Basophils = -- (--%)  Monocytes = -- (--%)  Bands = --%    11-10    143  |  101  |  19  ----------------------------<  77  4.6   |  30  |  0.81    Ca    8.8      10 Nov 2017 05:24    TPro  6.5  /  Alb  3.6  /  TBili  < 0.2<L>  /  DBili  x   /  AST  9   /  ALT  13  /  AlkPhos  89  11-08    ( 10 Nov 2017 13:17 )   PT: 12.9 SEC;   INR: 1.15 ;       PTT:27.3 SEC      CTA: no active pulmonary hemorrhage, unchanged left pneumothorax, patchy peribronchial opacities and cystic changes, small left pleural effusion mixed density       General: WN/WD NAD  Neurology: A&Ox3, nonfocal, BETH x 4  Eyes: PERRLA/ EOMI, Gross vision intact  ENT/Neck: Neck supple, trachea midline, No JVD, Gross hearing intact  Respiratory: Decreased left side   CV: RRR, S1S2, no murmurs, rubs or gallops  Abdominal: Soft, NT, ND +BS,   Extremities: No edema, + peripheral pulses  Skin: No Rashes, Hematoma, Ecchymosis  Incisions: healing

## 2017-11-10 NOTE — PROGRESS NOTE ADULT - ATTENDING COMMENTS
still with some hemoptysis!!  cta noted: persistent ptx:   no active bleeding: he would need IR help if he continues to cough up blood:   ?closure of PFO : per cardiology  taper prednisone to 10 mg / day!!

## 2017-11-10 NOTE — PROGRESS NOTE ADULT - PROBLEM SELECTOR PLAN 2
: still minor wheezing: cont steroids and he will need it for  long term.   And Symbicort with Atrovent nebs: can switch ti Spiriva too  11/10 Symbicort, Atrovent. keep O2 sat greater than 88%

## 2017-11-10 NOTE — PROGRESS NOTE ADULT - ASSESSMENT
s/p L VATS, thoracotomy, DAVID Lobectomy POD 46 and then reop for L Hemothorax evacuation POD 46  h/o sarcoid    PLAN  Neuro: Pain management; Neuro consult noted   Pulm: Encourage coughing, deep breathing and use of incentive spirometry. CTA chest completed; Daily CXR; monitor for worsening hemoptysis  Cardio: Monitor telemetry/alarms; cardiology Consult noted  GI: Tolerating diet.   Heme: Stable H/H. .   ID:  Stable.  Therapy: OOB/ambulate    Disposition: Aim to D/C to home once issues resolve  Discussed with Cardiothoracic Team at AM rounds.

## 2017-11-10 NOTE — CONSULT NOTE ADULT - SUBJECTIVE AND OBJECTIVE BOX
HPI:  47 year old male with a PMHx of sarcoidosis, HTN, asthma, and COPD presents to the ED with severe leg spasms and headache leading to a presyncopal episode, found to have a significant abnormal EKG and moderate inspiratory and expiratory wheezing in the setting of Entero/Rhino virus, admitted to [facility] for Near Syncope, r/o ACS and COPD exacerbation.  Hospital course complicated by stroke and PAF- AC on hold due to hemoptysis; 9/21-FB/BAL done by Dr. Arias. Intraop: Bleeding from DAVID (non-lingular)-Recom IR to embolize; 9/22 CT angio done per IR L[place] request.  Formal PFTS and ask Card to fix PFO now to decrease blood flow; 9/24- Developed worsening hemoptysis. Emergently kristel to OR and had  Left thoractomy DAVID. Brought to CTICU.  9/24: 1400ml EBL in OR; 10 u PRBCs;9/25-Brought back to OR for bleeding. Had Reop Thoracotomy, evac left hemothorax. CTI- DAVID space despite -40 Sxn, MEYER, L Hemiparesis (CVA) better w walking; Patient post op with prolongd air leak shila ptx, bedside pleurodesis done.  Developed SIRS after pleurodesis-> CTICU; On pressors. Pt. clinically improved in CTICU. Maintained on Steroids, weaned down to Solumedrol BID. Off pressors and transitioned to Midodrine. Still with thrombocytopenia but no bleeding. Continued on Eliquis but no ASA for h/o CVA. Neuro deficits improving.  Air leak resolved on 10/27. 10/30 Chest tube removed, cxr stable.  Steriods to po taper.  Patient stable for discharge.  11/2-Pt ambulating w PT frequently. Feels well. Pain controlled. All labs and vital signs stable. Wound healing. Arnold po steroid taper. To complete Voriconazole on Nov 4th. CXRs and reports reviewed by and cleared by Dr. Caldera. Pt. to fu for PFO closure as output. Still requires intermittent oxygen. Cleared for discharge by Dr. Caldera to acute rehab.    Patient presented to [facility] after rehab send patient with hemoptysis, patient had 2 episodes of hemoptysis, less than 10cc in total for 24 hours.  Patient states it happens when he coughs, blood tinged sputum.  Patient otherwise doing well in rehab. (09 Nov 2017 00:32)    Patient reports that he had left sided weakness from stroke that is now improving.          MEDICATIONS  (STANDING):  atorvastatin 80 milliGRAM(s) Oral at bedtime  buDESOnide 160 MICROgram(s)/formoterol 4.5 MICROgram(s) Inhaler 2 Puff(s) Inhalation two times a day  diVALproex  milliGRAM(s) Oral two times a day  docusate sodium 100 milliGRAM(s) Oral two times a day  ferrous    sulfate 325 milliGRAM(s) Oral three times a day with meals  folic acid 1 milliGRAM(s) Oral daily  ipratropium    for Nebulization 500 MICROGram(s) Nebulizer every 6 hours  levalbuterol Inhalation 0.63 milliGRAM(s) Inhalation every 6 hours  lidocaine   Patch 1 Patch Transdermal daily  pantoprazole    Tablet 40 milliGRAM(s) Oral before breakfast  polyethylene glycol 3350 17 Gram(s) Oral at bedtime  predniSONE   Tablet 20 milliGRAM(s) Oral daily    MEDICATIONS  (PRN):  acetaminophen   Tablet 650 milliGRAM(s) Oral every 6 hours PRN Mild Pain (1 - 3)  oxyCODONE    IR 10 milliGRAM(s) Oral every 4 hours PRN Severe Pain (7 - 10)  oxyCODONE    IR 5 milliGRAM(s) Oral every 4 hours PRN Moderate Pain (4 - 6)    PAST MEDICAL & SURGICAL HISTORY:  History of pneumothorax: History of 3 prior pneumonthoracies.  COPD (chronic obstructive pulmonary disease)  Asthma  Hypertension  Sarcoidosis  History of thoracotomy: 9/24/17 Left thoracotomy, Left upper lobectomy   9/25/17 Reop left thoracotomy, evacuation of left hemothorax    FAMILY HISTORY:  Family history of pancreatic cancer  Family history of essential hypertension (Father)    Allergies    No Known Allergies    Intolerances        SHx - No smoking, No ETOH, No drug abuse      Review of Systems:  CONSTITUTIONAL:  No weight loss, fever, chills, weakness or fatigue.  HEENT:  Eyes:  No visual loss, blurred vision, double vision or yellow sclerae. Ears, Nose, Throat:  No hearing loss, sneezing, congestion, runny nose or sore throat.  SKIN:  No rash or itching.  CARDIOVASCULAR:  No chest pain, chest pressure or chest discomfort. No palpitations or edema.  RESPIRATORY:  No shortness of breath, cough or sputum.  GASTROINTESTINAL:  No anorexia, nausea, vomiting or diarrhea. No abdominal pain or blood.  GENITOURINARY:  NO Burning on urination.   NEUROLOGICAL: See HPI  MUSCULOSKELETAL:  No muscle, back pain, joint pain or stiffness.  HEMATOLOGIC:  No anemia, bleeding or bruising.  LYMPHATICS:  No enlarged nodes. No history of splenectomy.  PSYCHIATRIC:  No history of depression or anxiety.  ENDOCRINOLOGIC:  No reports of sweating, cold or heat intolerance. No polyuria or polydipsia.  ALLERGIES:  No history of asthma, hives, eczema or rhinitis.        Vital Signs Last 24 Hrs  T(C): 36.7 (10 Nov 2017 11:24), Max: 37.1 (10 Nov 2017 10:16)  T(F): 98.1 (10 Nov 2017 11:24), Max: 98.7 (10 Nov 2017 10:16)  HR: 107 (10 Nov 2017 11:24) (81 - 108)  BP: 121/82 (10 Nov 2017 11:24) (103/67 - 136/86)  BP(mean): --  RR: 18 (10 Nov 2017 10:16) (18 - 18)  SpO2: 100% (10 Nov 2017 11:24) (97% - 100%)    General Exam:   General appearance: No acute distress                   Neurological Exam:  Mental Status: Orientated to self, date and place.  Attention intact.  No dysarthria, aphasia or neglect.  Knowledge intact.  Registration intact.  Short and long term memory grossly intact.      Cranial Nerves: CN I - not tested.  PERRL, EOMI, VFF, no nystagmus or diplopia.  No APD.  Fundi not visualized bilaterally.  CN V1-3 intact to light touch and pinprick.  No facial asymmetry.  Hearing intact to finger rub bilaterally.  Tongue, uvula and palate midline.  Sternocleidomastoid and Trapezius intact bilaterally.    Motor:   Tone: normal.                  Strength:     [] Upper extremity                      Delt       Bicep    Tricep                                                  R         5/5        5/5        5/5       5/5                                               L          4/5        4/5        3/5       4/5  [] Lower extremity                       HF          KE          KF        DF         PF                                               R        5/5        5/5        5/5       5/5       5/5                                               L         4+/5        4/5       4/5       5/5        5/5                        Tremor: No resting, postural or action tremor.  No myoclonus.    Sensation: intact to light touch, pinprick, vibration and proprioception        Other:    11-10    143  |  101  |  19  ----------------------------<  77  4.6   |  30  |  0.81    Ca    8.8      10 Nov 2017 05:24    TPro  6.5  /  Alb  3.6  /  TBili  < 0.2<L>  /  DBili  x   /  AST  9   /  ALT  13  /  AlkPhos  89  11-08    11-10    143  |  101  |  19  ----------------------------<  77  4.6   |  30  |  0.81    Ca    8.8      10 Nov 2017 05:24    TPro  6.5  /  Alb  3.6  /  TBili  < 0.2<L>  /  DBili  x   /  AST  9   /  ALT  13  /  AlkPhos  89  11-08                          10.6   9.22  )-----------( 213      ( 10 Nov 2017 05:24 )             34.2

## 2017-11-10 NOTE — CONSULT NOTE ADULT - SUBJECTIVE AND OBJECTIVE BOX
Patient seen and evaluated at bedside    Chief Complaint:    HPI:  47 year old male with a PMHx of sarcoidosis, HTN, asthma, and COPD presents to the ED with severe leg spasms and headache leading to a presyncopal episode, found to have a significant abnormal EKG and moderate inspiratory and expiratory wheezing in the setting of Entero/Rhino virus, admitted to [facility] for Near Syncope, r/o ACS and COPD exacerbation.  Hospital course complicated by stroke and PAF- AC on hold due to hemoptysis; 9/21-FB/BAL done by Dr. Arias. Intraop: Bleeding from DAVID (non-lingular)-Recom IR to embolize; 9/22 CT angio done per IR Lobko request.  Formal PFTS and ask Card to fix PFO now to decrease blood flow; 9/24- Developed worsening hemoptysis. Emergently kristel to OR and had  Left thoractomy DAVID. Brought to CTICU.  9/24: 1400ml EBL in OR; 10 u PRBCs;9/25-Brought back to OR for bleeding. Had Reop Thoracotomy, evac left hemothorax. CTI- DAVID space despite -40 Sxn, MEYER, L Hemiparesis (CVA) better w walking; Patient post op with prolongd air leak shila ptx, bedside pleurodesis done.  Developed SIRS after pleurodesis-> CTICU; On pressors. Pt. clinically improved in CTICU. Maintained on Steroids, weaned down to Solumedrol BID. Off pressors and transitioned to Midodrine. Still with thrombocytopenia but no bleeding. Continued on Eliquis but no ASA for h/o CVA. Neuro deficits improving.  Air leak resolved on 10/27. 10/30 Chest tube removed, cxr stable.  Steriods to po taper.  Patient stable for discharge.  11/2-Pt ambulating w PT frequently. Feels well. Pain controlled. All labs and vital signs stable. Wound healing. Arnold po steroid taper. To complete Voriconazole on Nov 4th. CXRs and reports reviewed by and cleared by Dr. Caldera. Pt. to fu for PFO closure as output. Still requires intermittent oxygen. Cleared for discharge by Dr. Caldera to acute rehab.    Patient presented to [facility] after rehab send patient with hemoptysis, patient had 2 episodes of hemoptysis, less than 10cc in total for 24 hours.  Patient states it happens when he coughs, blood tinged sputum.  Patient otherwise doing well in rehab. (09 Nov 2017 00:32)      PMHx:   History of pneumothorax  COPD (chronic obstructive pulmonary disease)  Asthma  Hypertension  Sarcoidosis      PSHx:   History of thoracotomy  No significant past surgical history      Allergies:  No Known Allergies      Medications:   acetaminophen   Tablet 650 milliGRAM(s) Oral every 6 hours PRN  atorvastatin 80 milliGRAM(s) Oral at bedtime  buDESOnide 160 MICROgram(s)/formoterol 4.5 MICROgram(s) Inhaler 2 Puff(s) Inhalation two times a day  diVALproex  milliGRAM(s) Oral two times a day  docusate sodium 100 milliGRAM(s) Oral two times a day  ferrous    sulfate 325 milliGRAM(s) Oral three times a day with meals  folic acid 1 milliGRAM(s) Oral daily  ipratropium    for Nebulization 500 MICROGram(s) Nebulizer every 6 hours  levalbuterol Inhalation 0.63 milliGRAM(s) Inhalation every 6 hours  lidocaine   Patch 1 Patch Transdermal daily  oxyCODONE    IR 10 milliGRAM(s) Oral every 4 hours PRN  oxyCODONE    IR 5 milliGRAM(s) Oral every 4 hours PRN  pantoprazole    Tablet 40 milliGRAM(s) Oral before breakfast  polyethylene glycol 3350 17 Gram(s) Oral at bedtime  predniSONE   Tablet 20 milliGRAM(s) Oral daily      FAMILY HISTORY:  Family history of pancreatic cancer  Family history of essential hypertension (Father)      Social History:  Smoking History:  Alcohol Use:  Drug Use:    Review of Systems:  REVIEW OF SYSTEMS:    CONSTITUTIONAL: + weakness LUE, LLE  NECK: No pain or stiffness  RESPIRATORY: +cough with occasional hemoptysis (<3 times/day), +wheezing, +mild SOB.   CARDIOVASCULAR: No chest pain or palpitations; No lower extremity edema  GASTROINTESTINAL: No abdominal or epigastric pain. No nausea, vomiting, or hematemesis; No diarrhea or constipation. No melena or hematochezia.  BACK: No back pain  GENITOURINARY: No dysuria, frequency or hematuria  NEUROLOGICAL: No numbness or weakness  SKIN: No itching, burning, rashes, or lesions   All other review of systems is negative unless indicated above.    Physical Exam:  T(F): 98.1 (11-10), Max: 98.7 (11-10)  HR: 107 (11-10) (81 - 108)  BP: 121/82 (11-10) (103/67 - 136/86)  RR: 18 (11-10)  SpO2: 100% (11-10)  GENERAL: No acute distress, well-developed  HEAD:  Atraumatic, Normocephalic  ENT: EOMI, PERRLA, conjunctiva and sclera clear, Neck supple, No JVD, moist mucosa  CHEST/LUNG: Clear to auscultation bilaterally; No wheeze, equal breath sounds bilaterally   BACK: No spinal tenderness  HEART: Regular rate and rhythm; No murmurs, rubs, or gallops  ABDOMEN: Soft, Nontender, Nondistended; Bowel sounds present  EXTREMITIES:  No clubbing, cyanosis, or edema  PSYCH: Nl behavior, nl affect  NEUROLOGY: AAOx3, non-focal, cranial nerves intact  SKIN: Normal color, No rashes or lesions  LINES:    Cardiovascular Diagnostic Testing:    ECG: Personally reviewed:    Echo: < from: JONATAN w/o TTE (w/3D Echo) (09.18.17 @ 17:45) >  CONCLUSIONS:  1. Normal mitral valve. Minimal mitral regurgitation.  2. Normal trileaflet aortic valve. No aortic valve  regurgitation seen.  3. Normal aortic root, aortic arch and descending thoracic  aorta.  4. Normal left atrium.  No left atrial or left atrial  appendage thrombus.  5. Normal left ventricular systolic function. No segmental  wall motion abnormalities.  6. Right ventricular enlargement with decreased right  ventricular systolic function.  7. Agitated saline injection and color flow Doppler  demonstrate evidence of a large patent foramen ovale.  ------------------------------------------------------------------------  Confirmed on  9/18/2017 - 10:04:40by William Goel M.D.  ------------------------------------------------------------------------    < end of copied text >      Stress Testing:    Cath:    Imaging:    CXR: Personally reviewed    Labs: Personally reviewed                        10.6   9.22  )-----------( 213      ( 10 Nov 2017 05:24 )             34.2     11-10    143  |  101  |  19  ----------------------------<  77  4.6   |  30  |  0.81    Ca    8.8      10 Nov 2017 05:24    TPro  6.5  /  Alb  3.6  /  TBili  < 0.2<L>  /  DBili  x   /  AST  9   /  ALT  13  /  AlkPhos  89  11-08 Patient seen and evaluated at bedside    Chief Complaint:    HPI:  Consult called by CT surgery YUDITH Chan for recommendations re: management of PFO seen on Echo on 9/18. This is a 46 y/o M PMHx sarcoidosis, HTN, COPD, asthma initially brought to the ED on 9/10 for c/o leg spasms and wheezing. He had a protracted and complicated hospital stay from 9/10/17 to 11/2/17. In summary, his hospital stay is as follows: Patient was initially treated for COPD exacerbation and admitted. He later had an RRT called for AMS/obtundation concerning for seizure vs. CVA. Required MICU transfer and pressor therapy. Was worked with by Ruddy, started on valproate and worked with with 24-hour EEG and MRI. MRI revealed B/L (R>L) LCA CVA and R PCA infarct. Patient also received a TTE, signficiant for + bubble study. On 10/18 patient was started on Eliquis for paroxysmal AFib, after which he began developing occasional hemoptyisis. 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 On 9/24 he received     PMHx:   History of pneumothorax  COPD (chronic obstructive pulmonary disease)  Asthma  Hypertension  Sarcoidosis      PSHx:   History of thoracotomy  No significant past surgical history      Allergies:  No Known Allergies      Medications:   acetaminophen   Tablet 650 milliGRAM(s) Oral every 6 hours PRN  atorvastatin 80 milliGRAM(s) Oral at bedtime  buDESOnide 160 MICROgram(s)/formoterol 4.5 MICROgram(s) Inhaler 2 Puff(s) Inhalation two times a day  diVALproex  milliGRAM(s) Oral two times a day  docusate sodium 100 milliGRAM(s) Oral two times a day  ferrous    sulfate 325 milliGRAM(s) Oral three times a day with meals  folic acid 1 milliGRAM(s) Oral daily  ipratropium    for Nebulization 500 MICROGram(s) Nebulizer every 6 hours  levalbuterol Inhalation 0.63 milliGRAM(s) Inhalation every 6 hours  lidocaine   Patch 1 Patch Transdermal daily  oxyCODONE    IR 10 milliGRAM(s) Oral every 4 hours PRN  oxyCODONE    IR 5 milliGRAM(s) Oral every 4 hours PRN  pantoprazole    Tablet 40 milliGRAM(s) Oral before breakfast  polyethylene glycol 3350 17 Gram(s) Oral at bedtime  predniSONE   Tablet 20 milliGRAM(s) Oral daily      FAMILY HISTORY:  Family history of pancreatic cancer  Family history of essential hypertension (Father)      Social History:  Smoking History:  Alcohol Use:  Drug Use:    Review of Systems:  REVIEW OF SYSTEMS:    CONSTITUTIONAL: + weakness LUE, LLE  NECK: No pain or stiffness  RESPIRATORY: +cough with occasional hemoptysis (<3 times/day), +wheezing, +mild SOB.   CARDIOVASCULAR: No chest pain or palpitations; No lower extremity edema  GASTROINTESTINAL: No abdominal or epigastric pain. No nausea, vomiting, or hematemesis; No diarrhea or constipation. No melena or hematochezia.  BACK: No back pain  GENITOURINARY: No dysuria, frequency or hematuria  NEUROLOGICAL: No numbness or weakness  SKIN: No itching, burning, rashes, or lesions   All other review of systems is negative unless indicated above.    Physical Exam:  T(F): 98.1 (11-10), Max: 98.7 (11-10)  HR: 107 (11-10) (81 - 108)  BP: 121/82 (11-10) (103/67 - 136/86)  RR: 18 (11-10)  SpO2: 100% (11-10)  GENERAL: No acute distress, well-developed  HEAD:  Atraumatic, Normocephalic  ENT: EOMI, PERRLA, conjunctiva and sclera clear, Neck supple, No JVD, moist mucosa  CHEST/LUNG: Clear to auscultation bilaterally; No wheeze, equal breath sounds bilaterally   BACK: No spinal tenderness  HEART: Regular rate and rhythm; No murmurs, rubs, or gallops  ABDOMEN: Soft, Nontender, Nondistended; Bowel sounds present  EXTREMITIES:  No clubbing, cyanosis, or edema  PSYCH: Nl behavior, nl affect  NEUROLOGY: AAOx3, non-focal, cranial nerves intact  SKIN: Normal color, No rashes or lesions  LINES:    Cardiovascular Diagnostic Testing:    ECG: Personally reviewed:    Echo: < from: JONATAN w/o TTE (w/3D Echo) (09.18.17 @ 17:45) >  CONCLUSIONS:  1. Normal mitral valve. Minimal mitral regurgitation.  2. Normal trileaflet aortic valve. No aortic valve  regurgitation seen.  3. Normal aortic root, aortic arch and descending thoracic  aorta.  4. Normal left atrium.  No left atrial or left atrial  appendage thrombus.  5. Normal left ventricular systolic function. No segmental  wall motion abnormalities.  6. Right ventricular enlargement with decreased right  ventricular systolic function.  7. Agitated saline injection and color flow Doppler  demonstrate evidence of a large patent foramen ovale.  ------------------------------------------------------------------------  Confirmed on  9/18/2017 - 10:04:40by William Goel M.D.  ------------------------------------------------------------------------    < end of copied text >      Stress Testing:    Cath:    Imaging:    CXR: Personally reviewed    Labs: Personally reviewed                        10.6 9.22  )-----------( 213      ( 10 Nov 2017 05:24 )             34.2     11-10    143  |  101  |  19  ----------------------------<  77  4.6   |  30  |  0.81    Ca    8.8      10 Nov 2017 05:24    TPro  6.5  /  Alb  3.6  /  TBili  < 0.2<L>  /  DBili  x   /  AST  9   /  ALT  13  /  AlkPhos  89  11-08 Patient seen and evaluated at bedside    Chief Complaint:    HPI:  Consult called by CT surgery YUDITH Chan for recommendations re: management of PFO seen on Echo on 9/18. This is a 46 y/o M PMHx sarcoidosis, HTN, COPD, asthma initially brought to the ED on 9/10 for c/o leg spasms and wheezing. He had a protracted and complicated hospital stay from 9/10/17 to 11/2/17. In summary, his hospital stay is as follows: Patient was initially treated for COPD exacerbation and admitted. He later had an RRT called for AMS/obtundation concerning for seizure vs. CVA. Required MICU transfer and pressor therapy. Was worked with by Ruddy, started on valproate and worked with with 24-hour EEG and MRI. MRI revealed B/L (R>L) LCA CVA and R PCA infarct. Patient also received a TTE, signficiant for + bubble study. On 10/18 patient was started on Eliquis for paroxysmal AFib noted on EKG, after which he began developing occasional hemoptysis. 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 He then underwent chemical pleurodesis procedure with talc, therapy c/b fever, hypotension and JERRI. Finally discharged to [facility] inpatient rehab. [facility] hemoptysis and Eliquis was d/c'd. Now CT surgery would like cardiology input re: utility of PFO closure in setting of recent cessation of anticoagulation and recent stroke.    PMHx:   History of pneumothorax  COPD (chronic obstructive pulmonary disease)  Asthma  Hypertension  Sarcoidosis      PSHx:   History of thoracotomy  No significant past surgical history      Allergies:  No Known Allergies      Medications:   acetaminophen   Tablet 650 milliGRAM(s) Oral every 6 hours PRN  atorvastatin 80 milliGRAM(s) Oral at bedtime  buDESOnide 160 MICROgram(s)/formoterol 4.5 MICROgram(s) Inhaler 2 Puff(s) Inhalation two times a day  diVALproex  milliGRAM(s) Oral two times a day  docusate sodium 100 milliGRAM(s) Oral two times a day  ferrous    sulfate 325 milliGRAM(s) Oral three times a day with meals  folic acid 1 milliGRAM(s) Oral daily  ipratropium    for Nebulization 500 MICROGram(s) Nebulizer every 6 hours  levalbuterol Inhalation 0.63 milliGRAM(s) Inhalation every 6 hours  lidocaine   Patch 1 Patch Transdermal daily  oxyCODONE    IR 10 milliGRAM(s) Oral every 4 hours PRN  oxyCODONE    IR 5 milliGRAM(s) Oral every 4 hours PRN  pantoprazole    Tablet 40 milliGRAM(s) Oral before breakfast  polyethylene glycol 3350 17 Gram(s) Oral at bedtime  predniSONE   Tablet 20 milliGRAM(s) Oral daily      FAMILY HISTORY:  Family history of pancreatic cancer  Family history of essential hypertension (Father)      Social History:  Smoking History: None  Alcohol Use: Social  Drug Use: None    Review of Systems:  REVIEW OF SYSTEMS:    CONSTITUTIONAL: + weakness LUE, LLE  NECK: No pain or stiffness  RESPIRATORY: +cough with occasional hemoptysis (<3 times/day), +wheezing, +mild SOB.   CARDIOVASCULAR: No chest pain or palpitations; No lower extremity edema  GASTROINTESTINAL: No abdominal or epigastric pain. No nausea, vomiting, or hematemesis; No diarrhea or constipation. No melena or hematochezia.  BACK: No back pain  GENITOURINARY: No dysuria, frequency or hematuria  NEUROLOGICAL: No numbness or weakness  SKIN: No itching, burning, rashes, or lesions   All other review of systems is negative unless indicated above.    Physical Exam:  T(F): 98.1 (11-10), Max: 98.7 (11-10)  HR: 107 (11-10) (81 - 108)  BP: 121/82 (11-10) (103/67 - 136/86)  RR: 18 (11-10)  SpO2: 100% (11-10)  GENERAL: No acute distress, well-developed  HEAD:  Atraumatic, Normocephalic  ENT: EOMI, PERRLA, conjunctiva and sclera clear, Neck supple, No JVD, moist mucosa  CHEST/LUNG: Clear to auscultation bilaterally; No wheeze, equal breath sounds bilaterally   BACK: No spinal tenderness  HEART: Regular rate and rhythm; No murmurs, rubs, or gallops  ABDOMEN: Soft, Nontender, Nondistended; Bowel sounds present  EXTREMITIES:  No clubbing, cyanosis, or edema  PSYCH: Nl behavior, nl affect  NEUROLOGY: AAOx3, non-focal, cranial nerves intact  SKIN: Normal color, No rashes or lesions  LINES:    Cardiovascular Diagnostic Testing:    ECG: Personally reviewed:    Echo: < from: JONATAN w/o TTE (w/3D Echo) (09.18.17 @ 17:45) >  CONCLUSIONS:  1. Normal mitral valve. Minimal mitral regurgitation.  2. Normal trileaflet aortic valve. No aortic valve  regurgitation seen.  3. Normal aortic root, aortic arch and descending thoracic  aorta.  4. Normal left atrium.  No left atrial or left atrial  appendage thrombus.  5. Normal left ventricular systolic function. No segmental  wall motion abnormalities.  6. Right ventricular enlargement with decreased right  ventricular systolic function.  7. Agitated saline injection and color flow Doppler  demonstrate evidence of a large patent foramen ovale.  ------------------------------------------------------------------------  Confirmed on  9/18/2017 - 10:04:40by William Goel M.D.  ------------------------------------------------------------------------    < end of copied text >      Stress Testing:    Cath:    Imaging:    CXR: Personally reviewed    Labs: Personally reviewed                        10.6 9.22  )-----------( 213      ( 10 Nov 2017 05:24 )             34.2     11-10    143  |  101  |  19  ----------------------------<  77  4.6   |  30  |  0.81    Ca    8.8      10 Nov 2017 05:24    TPro  6.5  /  Alb  3.6  /  TBili  < 0.2<L>  /  DBili  x   /  AST  9   /  ALT  13  /  AlkPhos  89  11-08 Patient seen and evaluated at bedside    Chief Complaint: Hemoptysis    HPI:  Consult called by CT surgery YUDITH Chan for recommendations re: management of PFO seen on Echo on 9/18. This is a 46 y/o M PMHx sarcoidosis, HTN, COPD, asthma initially brought to the ED on 9/10 for c/o leg spasms and wheezing. He had a protracted and complicated hospital stay from 9/10/17 to 11/2/17. In summary, his hospital stay is as follows: Patient was initially treated for COPD exacerbation and admitted. He later had an RRT called for AMS/obtundation concerning for seizure vs. CVA. Required MICU transfer and pressor therapy. Was worked with by Ruddy, started on valproate and worked with with 24-hour EEG and MRI. MRI revealed B/L (R>L) LCA CVA and R PCA infarct. Patient also received a TTE, signficiant for + bubble study. On 10/18 patient was started on Eliquis for paroxysmal AFib noted on EKG, after which he began developing occasional hemoptysis. 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 He then underwent chemical pleurodesis procedure with talc, therapy c/b fever, hypotension and JERRI. Finally discharged to [facility] inpatient rehab. He was subsequently transferred back to [facility] due to recurrent hemoptysis and Eliquis was d/c'd. Now CT surgery would like cardiology input re: utility of PFO closure in setting of recent cessation of anticoagulation and recent stroke.    PMHx:   History of pneumothorax  COPD (chronic obstructive pulmonary disease)  Asthma  Hypertension  Sarcoidosis      PSHx:   History of thoracotomy  No significant past surgical history      Allergies:  No Known Allergies      Medications:   acetaminophen   Tablet 650 milliGRAM(s) Oral every 6 hours PRN  atorvastatin 80 milliGRAM(s) Oral at bedtime  buDESOnide 160 MICROgram(s)/formoterol 4.5 MICROgram(s) Inhaler 2 Puff(s) Inhalation two times a day  diVALproex  milliGRAM(s) Oral two times a day  docusate sodium 100 milliGRAM(s) Oral two times a day  ferrous    sulfate 325 milliGRAM(s) Oral three times a day with meals  folic acid 1 milliGRAM(s) Oral daily  ipratropium    for Nebulization 500 MICROGram(s) Nebulizer every 6 hours  levalbuterol Inhalation 0.63 milliGRAM(s) Inhalation every 6 hours  lidocaine   Patch 1 Patch Transdermal daily  oxyCODONE    IR 10 milliGRAM(s) Oral every 4 hours PRN  oxyCODONE    IR 5 milliGRAM(s) Oral every 4 hours PRN  pantoprazole    Tablet 40 milliGRAM(s) Oral before breakfast  polyethylene glycol 3350 17 Gram(s) Oral at bedtime  predniSONE   Tablet 20 milliGRAM(s) Oral daily      FAMILY HISTORY:  Family history of pancreatic cancer  Family history of essential hypertension (Father)      Social History:  Smoking History: None  Alcohol Use: Social  Drug Use: None    Review of Systems:  REVIEW OF SYSTEMS:    CONSTITUTIONAL: + weakness LUE, LLE  NECK: No pain or stiffness  RESPIRATORY: +cough with occasional hemoptysis (<3 times/day), +wheezing, +mild SOB.   CARDIOVASCULAR: No chest pain or palpitations; No lower extremity edema  GASTROINTESTINAL: No abdominal or epigastric pain. No nausea, vomiting, or hematemesis; No diarrhea or constipation. No melena or hematochezia.  BACK: No back pain  GENITOURINARY: No dysuria, frequency or hematuria  NEUROLOGICAL: +numbness, L sided weakness  SKIN: No itching, burning, rashes, or lesions   All other review of systems is negative unless indicated above.    Physical Exam:  T(F): 98.1 (11-10), Max: 98.7 (11-10)  HR: 107 (11-10) (81 - 108)  BP: 121/82 (11-10) (103/67 - 136/86)  RR: 18 (11-10)  SpO2: 100% (11-10)  GENERAL: No acute distress, well-developed  HEAD:  Atraumatic, Normocephalic  CHEST/LUNG: Diffuse rhonchi and wheezes bilaterally.  HEART: Regular rate and rhythm; No murmurs, rubs, or gallops  ABDOMEN: Soft, Nontender, Nondistended; Bowel sounds present  EXTREMITIES:  No clubbing, cyanosis, or edema  PSYCH: Nl behavior, nl affect  NEUROLOGY: AAOx3, 4/5 strength LUE, 5/5 RUE. 5/5 strength B/L LE.  SKIN: Normal color, No rashes or lesions  LINES:    Cardiovascular Diagnostic Testing:    ECG: Personally reviewed:    Echo: < from: JONATAN w/o TTE (w/3D Echo) (09.18.17 @ 17:45) >  CONCLUSIONS:  1. Normal mitral valve. Minimal mitral regurgitation.  2. Normal trileaflet aortic valve. No aortic valve  regurgitation seen.  3. Normal aortic root, aortic arch and descending thoracic  aorta.  4. Normal left atrium.  No left atrial or left atrial  appendage thrombus.  5. Normal left ventricular systolic function. No segmental  wall motion abnormalities.  6. Right ventricular enlargement with decreased right  ventricular systolic function.  7. Agitated saline injection and color flow Doppler  demonstrate evidence of a large patent foramen ovale.  ------------------------------------------------------------------------  Confirmed on  9/18/2017 - 10:04:40by William Goel M.D.  ------------------------------------------------------------------------    < end of copied text >      Stress Testing:    Cath:    Imaging:    CXR: Personally reviewed    Labs: Personally reviewed                        10.6   9.22  )-----------( 213      ( 10 Nov 2017 05:24 )             34.2     11-10    143  |  101  |  19  ----------------------------<  77  4.6   |  30  |  0.81    Ca    8.8      10 Nov 2017 05:24    TPro  6.5  /  Alb  3.6  /  TBili  < 0.2<L>  /  DBili  x   /  AST  9   /  ALT  13  /  AlkPhos  89  11-08 Patient seen and evaluated at bedside    Chief Complaint: Hemoptysis    HPI:  Consult called by CT surgery YUDITH Chan for recommendations re: management of PFO seen on Echo on 9/18. This is a 48 y/o M PMHx sarcoidosis, HTN, COPD, asthma initially brought to the ED on 9/10 for c/o leg spasms and wheezing. He had a protracted and complicated hospital stay from 9/10/17 to 11/2/17. In summary, his hospital stay is as follows: Patient was initially treated for COPD exacerbation and admitted. He later had an RRT called for AMS/obtundation concerning for seizure vs. CVA. Required MICU transfer and pressor therapy. Was worked with by Ruddy, started on valproate and worked with with 24-hour EEG and MRI. MRI revealed B/L (R>L) LCA CVA and R PCA infarct. Patient also received a TTE, signficiant for + bubble study. On 10/18 patient was started on Eliquis for paroxysmal AFib noted on EKG, after which he began developing occasional hemoptysis. 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 He then underwent chemical pleurodesis procedure with talc, therapy c/b fever, hypotension and JERRI. Finally discharged to [facility] inpatient rehab. He was subsequently transferred back to [facility] due to recurrent hemoptysis and Eliquis was d/c'd. Now CT surgery would like cardiology input re: utility of PFO closure in setting of recent cessation of anticoagulation and recent stroke.    PMHx:   History of pneumothorax  COPD (chronic obstructive pulmonary disease)  Asthma  Hypertension  Sarcoidosis      PSHx:   History of thoracotomy      Allergies:  No Known Allergies      Medications:   acetaminophen   Tablet 650 milliGRAM(s) Oral every 6 hours PRN  atorvastatin 80 milliGRAM(s) Oral at bedtime  buDESOnide 160 MICROgram(s)/formoterol 4.5 MICROgram(s) Inhaler 2 Puff(s) Inhalation two times a day  diVALproex  milliGRAM(s) Oral two times a day  docusate sodium 100 milliGRAM(s) Oral two times a day  ferrous    sulfate 325 milliGRAM(s) Oral three times a day with meals  folic acid 1 milliGRAM(s) Oral daily  ipratropium    for Nebulization 500 MICROGram(s) Nebulizer every 6 hours  levalbuterol Inhalation 0.63 milliGRAM(s) Inhalation every 6 hours  lidocaine   Patch 1 Patch Transdermal daily  oxyCODONE    IR 10 milliGRAM(s) Oral every 4 hours PRN  oxyCODONE    IR 5 milliGRAM(s) Oral every 4 hours PRN  pantoprazole    Tablet 40 milliGRAM(s) Oral before breakfast  polyethylene glycol 3350 17 Gram(s) Oral at bedtime  predniSONE   Tablet 20 milliGRAM(s) Oral daily      FAMILY HISTORY:  Family history of pancreatic cancer  Family history of essential hypertension (Father)      Social History:  Smoking History: None  Alcohol Use: Social  Drug Use: None    Review of Systems:  REVIEW OF SYSTEMS:    CONSTITUTIONAL: + weakness LUE, LLE  NECK: No pain or stiffness  RESPIRATORY: +cough with occasional hemoptysis (<3 times/day), +wheezing, +mild SOB.   CARDIOVASCULAR: No chest pain or palpitations; No lower extremity edema  GASTROINTESTINAL: No abdominal or epigastric pain. No nausea, vomiting, or hematemesis; No diarrhea or constipation. No melena or hematochezia.  BACK: No back pain  GENITOURINARY: No dysuria, frequency or hematuria  NEUROLOGICAL: +numbness, L sided weakness  SKIN: No itching, burning, rashes, or lesions   All other review of systems is negative unless indicated above.    Physical Exam:  T(F): 98.1 (11-10), Max: 98.7 (11-10)  HR: 107 (11-10) (81 - 108)  BP: 121/82 (11-10) (103/67 - 136/86)  RR: 18 (11-10)  SpO2: 100% (11-10)  GENERAL: No acute distress, well-developed  HEAD:  Atraumatic, Normocephalic  CHEST/LUNG: Diffuse rhonchi and wheezes bilaterally.  HEART: Regular rate and rhythm; No murmurs, rubs, or gallops  ABDOMEN: Soft, Nontender, Nondistended; Bowel sounds present  EXTREMITIES:  No clubbing, cyanosis, or edema  PSYCH: Nl behavior, nl affect  NEUROLOGY: AAOx3, 4/5 strength LUE, 5/5 RUE. 5/5 strength B/L LE.  SKIN: Normal color, No rashes or lesions  LINES:    Cardiovascular Diagnostic Testing:    ECG: Personally reviewed:    Echo: < from: JONATAN w/o TTE (w/3D Echo) (09.18.17 @ 17:45) >  CONCLUSIONS:  1. Normal mitral valve. Minimal mitral regurgitation.  2. Normal trileaflet aortic valve. No aortic valve  regurgitation seen.  3. Normal aortic root, aortic arch and descending thoracic  aorta.  4. Normal left atrium.  No left atrial or left atrial  appendage thrombus.  5. Normal left ventricular systolic function. No segmental  wall motion abnormalities.  6. Right ventricular enlargement with decreased right  ventricular systolic function.  7. Agitated saline injection and color flow Doppler  demonstrate evidence of a large patent foramen ovale.  ------------------------------------------------------------------------  Confirmed on  9/18/2017 - 10:04:40by William Goel M.D.  ------------------------------------------------------------------------    < end of copied text >      Stress Testing:    Cath:    Imaging:    CXR:   < from: Xray Chest 1 View AP -PORTABLE-Routine (11.09.17 @ 07:44) >  IMPRESSION:  Stable chest x-ray.    Small left pleural effusion unchanged.    Unchanged bilateral lung opacities which may be related to architectural   distortion.    Bilateral bullous change with large left upper lobe bulla again noted.   This makes evaluation for pneumothorax difficult. No gross pneumothorax   identified.              DOREEN TEIXEIRA M.D., RADIOLOGY RESIDENT  This document has been electronically signed.  KEERTHI FIELDS M.D., ATTENDING RADIOLOGIST  This document has been electronically signed. Nov 9 2017 11:42AM    < end of copied text >      Labs: Personally reviewed                        10.6 9.22  )-----------( 213      ( 10 Nov 2017 05:24 )             34.2     11-10    143  |  101  |  19  ----------------------------<  77  4.6   |  30  |  0.81    Ca    8.8      10 Nov 2017 05:24    TPro  6.5  /  Alb  3.6  /  TBili  < 0.2<L>  /  DBili  x   /  AST  9   /  ALT  13  /  AlkPhos  89  11-08

## 2017-11-11 LAB
HCT VFR BLD CALC: 32.4 % — LOW (ref 39–50)
HGB BLD-MCNC: 10.1 G/DL — LOW (ref 13–17)
MCHC RBC-ENTMCNC: 30.5 PG — SIGNIFICANT CHANGE UP (ref 27–34)
MCHC RBC-ENTMCNC: 31.2 % — LOW (ref 32–36)
MCV RBC AUTO: 97.9 FL — SIGNIFICANT CHANGE UP (ref 80–100)
NRBC # FLD: 0 — SIGNIFICANT CHANGE UP
PLATELET # BLD AUTO: 180 K/UL — SIGNIFICANT CHANGE UP (ref 150–400)
PMV BLD: 13 FL — SIGNIFICANT CHANGE UP (ref 7–13)
RBC # BLD: 3.31 M/UL — LOW (ref 4.2–5.8)
RBC # FLD: 15.4 % — HIGH (ref 10.3–14.5)
WBC # BLD: 8.58 K/UL — SIGNIFICANT CHANGE UP (ref 3.8–10.5)
WBC # FLD AUTO: 8.58 K/UL — SIGNIFICANT CHANGE UP (ref 3.8–10.5)

## 2017-11-11 PROCEDURE — 99221 1ST HOSP IP/OBS SF/LOW 40: CPT

## 2017-11-11 PROCEDURE — 71010: CPT | Mod: 26

## 2017-11-11 RX ORDER — METOPROLOL TARTRATE 50 MG
12.5 TABLET ORAL EVERY 12 HOURS
Qty: 0 | Refills: 0 | Status: DISCONTINUED | OUTPATIENT
Start: 2017-11-11 | End: 2017-11-15

## 2017-11-11 RX ADMIN — DIVALPROEX SODIUM 500 MILLIGRAM(S): 500 TABLET, DELAYED RELEASE ORAL at 17:13

## 2017-11-11 RX ADMIN — Medication 1 MILLIGRAM(S): at 11:38

## 2017-11-11 RX ADMIN — Medication 100 MILLIGRAM(S): at 05:18

## 2017-11-11 RX ADMIN — LEVALBUTEROL 0.63 MILLIGRAM(S): 1.25 SOLUTION, CONCENTRATE RESPIRATORY (INHALATION) at 03:22

## 2017-11-11 RX ADMIN — LIDOCAINE 1 PATCH: 4 CREAM TOPICAL at 11:38

## 2017-11-11 RX ADMIN — Medication 325 MILLIGRAM(S): at 17:12

## 2017-11-11 RX ADMIN — OXYCODONE HYDROCHLORIDE 5 MILLIGRAM(S): 5 TABLET ORAL at 09:18

## 2017-11-11 RX ADMIN — Medication 500 MICROGRAM(S): at 16:15

## 2017-11-11 RX ADMIN — ATORVASTATIN CALCIUM 80 MILLIGRAM(S): 80 TABLET, FILM COATED ORAL at 21:12

## 2017-11-11 RX ADMIN — OXYCODONE HYDROCHLORIDE 5 MILLIGRAM(S): 5 TABLET ORAL at 10:00

## 2017-11-11 RX ADMIN — Medication 500 MICROGRAM(S): at 12:03

## 2017-11-11 RX ADMIN — LIDOCAINE 1 PATCH: 4 CREAM TOPICAL at 23:53

## 2017-11-11 RX ADMIN — BUDESONIDE AND FORMOTEROL FUMARATE DIHYDRATE 2 PUFF(S): 160; 4.5 AEROSOL RESPIRATORY (INHALATION) at 21:12

## 2017-11-11 RX ADMIN — Medication 20 MILLIGRAM(S): at 05:18

## 2017-11-11 RX ADMIN — LEVALBUTEROL 0.63 MILLIGRAM(S): 1.25 SOLUTION, CONCENTRATE RESPIRATORY (INHALATION) at 16:15

## 2017-11-11 RX ADMIN — Medication 100 MILLIGRAM(S): at 17:12

## 2017-11-11 RX ADMIN — Medication 325 MILLIGRAM(S): at 07:50

## 2017-11-11 RX ADMIN — Medication 500 MICROGRAM(S): at 03:22

## 2017-11-11 RX ADMIN — LIDOCAINE 1 PATCH: 4 CREAM TOPICAL at 00:10

## 2017-11-11 RX ADMIN — BUDESONIDE AND FORMOTEROL FUMARATE DIHYDRATE 2 PUFF(S): 160; 4.5 AEROSOL RESPIRATORY (INHALATION) at 09:19

## 2017-11-11 RX ADMIN — LEVALBUTEROL 0.63 MILLIGRAM(S): 1.25 SOLUTION, CONCENTRATE RESPIRATORY (INHALATION) at 20:41

## 2017-11-11 RX ADMIN — PANTOPRAZOLE SODIUM 40 MILLIGRAM(S): 20 TABLET, DELAYED RELEASE ORAL at 06:04

## 2017-11-11 RX ADMIN — Medication 500 MICROGRAM(S): at 20:41

## 2017-11-11 RX ADMIN — Medication 100 MILLIGRAM(S): at 21:12

## 2017-11-11 RX ADMIN — Medication 12.5 MILLIGRAM(S): at 19:13

## 2017-11-11 RX ADMIN — Medication 325 MILLIGRAM(S): at 11:38

## 2017-11-11 RX ADMIN — DIVALPROEX SODIUM 500 MILLIGRAM(S): 500 TABLET, DELAYED RELEASE ORAL at 05:18

## 2017-11-11 RX ADMIN — LEVALBUTEROL 0.63 MILLIGRAM(S): 1.25 SOLUTION, CONCENTRATE RESPIRATORY (INHALATION) at 12:03

## 2017-11-11 RX ADMIN — BUDESONIDE AND FORMOTEROL FUMARATE DIHYDRATE 2 PUFF(S): 160; 4.5 AEROSOL RESPIRATORY (INHALATION) at 12:03

## 2017-11-11 NOTE — PROGRESS NOTE ADULT - SUBJECTIVE AND OBJECTIVE BOX
Pt without complaints, no hemoptysis overnight. CTA, Neurology note and cardiology note reviewed w/ Dr Caldera.  shortness of breath on exertion, which is baseline    Vital Signs Last 24 Hrs  T(C): 37 (11 Nov 2017 07:48), Max: 37.1 (10 Nov 2017 10:16)  T(F): 98.6 (11 Nov 2017 07:48), Max: 98.7 (10 Nov 2017 10:16)  HR: 95 (11 Nov 2017 07:48) (76 - 108)  BP: 107/77 (11 Nov 2017 07:48) (107/77 - 136/86)  BP(mean): --  RR: 18 (11 Nov 2017 07:48) (18 - 18)  SpO2: 100% (11 Nov 2017 07:48) (100% - 100%)    MEDICATIONS  (STANDING):  atorvastatin 80 milliGRAM(s) Oral at bedtime  buDESOnide 160 MICROgram(s)/formoterol 4.5 MICROgram(s) Inhaler 2 Puff(s) Inhalation two times a day  diVALproex  milliGRAM(s) Oral two times a day  docusate sodium 100 milliGRAM(s) Oral two times a day  ferrous    sulfate 325 milliGRAM(s) Oral three times a day with meals  folic acid 1 milliGRAM(s) Oral daily  ipratropium    for Nebulization 500 MICROGram(s) Nebulizer every 6 hours  levalbuterol Inhalation 0.63 milliGRAM(s) Inhalation every 6 hours  lidocaine   Patch 1 Patch Transdermal daily  pantoprazole    Tablet 40 milliGRAM(s) Oral before breakfast  polyethylene glycol 3350 17 Gram(s) Oral at bedtime    MEDICATIONS  (PRN):  acetaminophen   Tablet 650 milliGRAM(s) Oral every 6 hours PRN Mild Pain (1 - 3)  oxyCODONE    IR 10 milliGRAM(s) Oral every 4 hours PRN Severe Pain (7 - 10)  oxyCODONE    IR 5 milliGRAM(s) Oral every 4 hours PRN Moderate Pain (4 - 6)   I&O's Detail    10 Nov 2017 07:01  -  11 Nov 2017 07:00  --------------------------------------------------------  IN:  Total IN: 0 mL    OUT:    Voided: 650 mL  Total OUT: 650 mL    Total NET: -650 mL      11 Nov 2017 07:01  -  11 Nov 2017 09:39  --------------------------------------------------------  IN:  Total IN: 0 mL    OUT:    Voided: 180 mL  Total OUT: 180 mL    Total NET: -180 mL                            10.1   8.58  )-----------( 180      ( 11 Nov 2017 06:00 )             32.4     11-10    143  |  101  |  19  ----------------------------<  77  4.6   |  30  |  0.81    Ca    8.8      10 Nov 2017 05:24    CXR stable left apical PTX  pt had CTA yesterday which showed small blush at staple line, no active bleeding

## 2017-11-11 NOTE — PROGRESS NOTE ADULT - PROBLEM SELECTOR PLAN 2
: still minor wheezing: cont steroids and he will need it for  long term.   And Symbicort with Atrovent nebs: can switch ti Spiriva too  11/10 Symbicort, Atrovent. keep O2 sat greater than 88%  11/11 continue current meds.

## 2017-11-11 NOTE — PROGRESS NOTE ADULT - SUBJECTIVE AND OBJECTIVE BOX
Patient is a 47y old  Male who presents with a chief complaint of hemoptysis (09 Nov 2017 00:32)    Pertinent ROS: oob to chair. on nasal cannular. feels better. looks better. no more hemoptysis     MEDICATIONS  (STANDING):  atorvastatin 80 milliGRAM(s) Oral at bedtime  buDESOnide 160 MICROgram(s)/formoterol 4.5 MICROgram(s) Inhaler 2 Puff(s) Inhalation two times a day  diVALproex  milliGRAM(s) Oral two times a day  docusate sodium 100 milliGRAM(s) Oral two times a day  ferrous    sulfate 325 milliGRAM(s) Oral three times a day with meals  folic acid 1 milliGRAM(s) Oral daily  ipratropium    for Nebulization 500 MICROGram(s) Nebulizer every 6 hours  levalbuterol Inhalation 0.63 milliGRAM(s) Inhalation every 6 hours  lidocaine   Patch 1 Patch Transdermal daily  pantoprazole    Tablet 40 milliGRAM(s) Oral before breakfast  polyethylene glycol 3350 17 Gram(s) Oral at bedtime    MEDICATIONS  (PRN):  acetaminophen   Tablet 650 milliGRAM(s) Oral every 6 hours PRN Mild Pain (1 - 3)  oxyCODONE    IR 10 milliGRAM(s) Oral every 4 hours PRN Severe Pain (7 - 10)  oxyCODONE    IR 5 milliGRAM(s) Oral every 4 hours PRN Moderate Pain (4 - 6)    Vital Signs Last 24 Hrs  T(C): 37 (11 Nov 2017 07:48), Max: 37 (10 Nov 2017 16:17)  T(F): 98.6 (11 Nov 2017 07:48), Max: 98.6 (10 Nov 2017 16:17)  HR: 95 (11 Nov 2017 07:48) (76 - 107)  BP: 107/77 (11 Nov 2017 07:48) (107/77 - 123/79)  BP(mean): --  RR: 18 (11 Nov 2017 07:48) (18 - 18)  SpO2: 100% (11 Nov 2017 07:48) (100% - 100%)    I&O's Summary    10 Nov 2017 07:01  -  11 Nov 2017 07:00  --------------------------------------------------------  IN: 0 mL / OUT: 650 mL / NET: -650 mL    11 Nov 2017 07:01  -  11 Nov 2017 10:42  --------------------------------------------------------  IN: 0 mL / OUT: 180 mL / NET: -180 mL    Physical Exam:   GENERAL: NAD, well-groomed, well-developed  HEENT: GIOAVNI/   Atraumatic, Normocephalic  NECK: Supple, No JVD, Normal thyroid  CHEST/LUNG: soft wheezing+ left, very diminished to left. CTA to right posterior   CVS: irregular slow rate ; No murmurs, rubs, or gallops  GI: : Soft, Nontender, Nondistended; Bowel sounds present  NERVOUS SYSTEM:  Alert & Oriented X3  EXTREMITIES:  2+ Peripheral Pulses, No clubbing, cyanosis, pitting pedal edema (+) bilaterally  SKIN: Normal color, turgor. Dry   PSYCH: Appropriate. Calm   Labs:                        10.1   8.58  )-----------( 180      ( 11 Nov 2017 06:00 )             32.4     11-10    143  |  101  |  19  ----------------------------<  77  4.6   |  30  |  0.81    Ca    8.8      10 Nov 2017 05:24      CAPILLARY BLOOD GLUCOSE          PT/INR - ( 10 Nov 2017 13:17 )   PT: 12.9 SEC;   INR: 1.15          PTT - ( 10 Nov 2017 13:17 )  PTT:27.3 SEC    D DImer  Cultures:     Studies  Chest X-RAY < from: Xray Chest 1 View AP -PORTABLE-Routine (11.10.17 @ 07:56) >  EXAM:  RAD CHEST PORTABLE ROUTINE        PROCEDURE DATE:  Nov 10 2017         INTERPRETATION:  CLINICAL INDICATION: Follow-up. Hemoptysis.    TECHNIQUE: Frontal radiograph of the chest    COMPARISON:  Radiograph of the chest from 11/9/2017  7:26.    FINDINGS:    Bilateral bullous change is again noted with a large left upper lobe   bulla. There are chain sutures and surgical clips in the left midlung.   There is slight blunting of the bilateral costophrenic angles likely   secondary to tiny bilateral pleural effusions. Bilateral scarring is   noted. No definite pneumothorax is noted, however limited by bullous   changes. The cardiomediastinal silhouette is stable in size.    IMPRESSION:  No significant interval change.  Unchanged bilateral bullous changes and scarring.    < end of copied text >    CT SCAN Chest < from: CT Angio Chest w/ IV Cont (11.10.17 @ 11:10) >  EXAM:  CT ANGIO CHEST (W)AW IC        PROCEDURE DATE:  Nov 10 2017         INTERPRETATION:  CLINICAL INFORMATION: Hemoptysis, history of sarcoid and   aspergilloma, status post left upper lobectomy    COMPARISON: CT chest 11/3/2017    PROCEDURE:   CT Angiography of the Chest.  90 ml of Omnipaque 350 was injected intravenously. 10 ml were discarded.  Sagittal and coronal reformats were performed as well as MIPS.    FINDINGS:    CHEST:     PULMONARY ARTERY: Suboptimal study of the pulmonary artery and its distal   branches. No definite pulmonary embolism. The pulmonary artery is   enlarged, measuring 3.7 cm. No active pulmonary hemorrhage.  LUNGS AND LARGE AIRWAYS: Status post left upper lobe lobectomy. Unchanged   patchy peribronchial opacities, predominantly in the right upper lobe.   Unchanged cystic changes of the lungs bilaterally.  PLEURA: Unchanged left pneumothorax. Small left pleural effusion of mixed   density.  VESSELS: Normal left-sided aortic arch and descending aorta. No aneurysm.   No atherosclerotic changes.  HEART: Heart size is normal. Trace pericardial effusion.   MEDIASTINUM AND ERIC: No lymphadenopathy.  CHEST WALL AND LOWER NECK: Within normal limits.  VISUALIZED UPPER ABDOMEN: Bilateral nephrolithiasis without   hydronephrosis.  BONES: Unchanged left seventh rib thoracotomy defect. Mild degenerative   changes of the spine.    IMPRESSION: No active pulmonary hemorrhage.    Unchanged left pneumothorax.    Unchanged patchy peribronchial opacities and cystic changes, possibly   related to patient's known sarcoidosis.    Unchanged small left pleural effusion mixed density, possible element of   proteinaceous or hemorrhagic fluid.    < end of copied text >    CT Abdomen   Venous Dopplers: LE: < from: US Duplex Venous Lower Ext Complete, Bilateral (10.24.17 @ 11:42) >  EXAM:  US DPLX LWR EXT VEINS COMPL BI        PROCEDURE DATE:  Oct 24 2017         INTERPRETATION:  CLINICAL INFORMATION: Lower extremity swelling.    COMPARISON: None available.    TECHNIQUE: Duplex sonography of the BILATERAL LOWER extremities with   color and spectral Doppler, with and without compression.      FINDINGS:    There is normal compressibility of the bilateral common femoral, femoral   and popliteal veins. No calf vein thrombosis is detected.    Doppler examination shows normal spontaneous and phasic flow.    IMPRESSION:     No evidence of bilateral lower extremity deep venous thrombosis.    < end of copied text >    Others    < from: TTE with Doppler (w/Cont) (09.26.17 @ 16:52) >    Patient name: OSCAR CHACKO  YOB: 1970   Age: 47 (M)   MR#: [id number]  Study Date: 9/26/2017  [facility]aux  Study quality: Technically Difficult  Referring Physician: Ajay Alvarado MD  Blood Pressure: 92/58 mmHg  Height: 180 cm  Weight: 97 kg  BSA: 2.2 m2  ------------------------------------------------------------------------  PROCEDURE: Transthoracic echocardiogram with 2-D, M-Mode  and complete spectral and color flow Doppler.  Verbal consent was obtained for injection of echo contrast.  Following  intravenous injection of contrast, harmonic  imaging was performed.lot # [id number] exp 8/1/18  INDICATION: Unspecified combined systolic (congestive) and  diastolic (congestive) heart failure (I50.40)  ------------------------------------------------------------------------  OBSERVATIONS:  Mitral Valve: Normal mitral valve. Minimal mitral  regurgitation.  Aortic Root: Normal aortic root.  Aortic Valve: Aortic valve leaflet morphology not well  visualized.  Left Atrium: Normal left atrium.  Left Ventricle: Endocardium not well visualized; grossly  normal left ventricular systolic function.  Endocardial  visualization enhanced with intravenous injection of echo  contrast (Definity).  Right Heart: Normal right atrium. Unable to accurately  evaluate right ventricular size or systolic function.  Normal tricuspid valve.   Minimal tricuspid regurgitation.  Normal pulmonic valve.  Pericardium/PleuraNormal pericardium with no pericardial  effusion.  Hemodynamic: Estimated right ventricular systolic pressure  equals 73 mm Hg, assuming right atrial pressure equals 10  mm Hg, consistent with severe pulmonary hypertension.  ------------------------------------------------------------------------  CONCLUSIONS:  1. Normal mitral valve. Minimal mitral regurgitation.  2. Endocardium not well visualized; grossly normal left  ventricular systolic function.  Endocardial visualization  enhanced with intravenous injection of echo contrast  (Definity).  3. Unable to accurately evaluate right ventricular size or  systolic function.  4. Estimated right ventricular systolic pressure equals 73  mm Hg, assuming right atrial pressure equals 10 mm Hg,  consistent with severe pulmonary hypertension.  ------------------------------------------------------------------------    < end of copied text >

## 2017-11-11 NOTE — PROGRESS NOTE ADULT - PROBLEM SELECTOR PLAN 1
? 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 ot from bronchiectasis? eliquis have been dced: needs card to se: In addition, he may need IR help with embolization, it his hemoptysis increases: He has had pleurodesis on the left side.  11/10 quantify hemoptysis. no AC. stable  11/11 no more hemoptysis. off AC

## 2017-11-11 NOTE — PROGRESS NOTE ADULT - PROBLEM SELECTOR PLAN 1
resolved at this time, plan d/w w/ Dr Caldera. Cont off anti coagulation due to hemoptysis, cont to observe, may consuelo bronchoscopy in future, but at present will hold off, monitor HCT. If continues to do well plan for discharge early next week. PT saw pt, needs minimal assistance. Anticipate pt can go home with O2

## 2017-11-11 NOTE — PROGRESS NOTE ADULT - PROBLEM SELECTOR PLAN 5
s/p pleurodesis.  11/10 s/p pleurodesis. Management defer to CTS  11/11 no intervention for now. per CTS.

## 2017-11-11 NOTE — PROGRESS NOTE ADULT - ATTENDING COMMENTS
No hemoptysis in last 24 hours:  off anticoagulation:  concerned about recurrent hemoptysis:   cont steroids: I think he would need long term steroids: he starts to wheeze badly when it is tapered off:  PFO: defer to cardiology: He also has history of PAF:

## 2017-11-12 LAB
BUN SERPL-MCNC: 15 MG/DL — SIGNIFICANT CHANGE UP (ref 7–23)
CALCIUM SERPL-MCNC: 8.6 MG/DL — SIGNIFICANT CHANGE UP (ref 8.4–10.5)
CHLORIDE SERPL-SCNC: 96 MMOL/L — LOW (ref 98–107)
CO2 SERPL-SCNC: 33 MMOL/L — HIGH (ref 22–31)
CREAT SERPL-MCNC: 0.73 MG/DL — SIGNIFICANT CHANGE UP (ref 0.5–1.3)
GLUCOSE SERPL-MCNC: 86 MG/DL — SIGNIFICANT CHANGE UP (ref 70–99)
HCT VFR BLD CALC: 32.8 % — LOW (ref 39–50)
HGB BLD-MCNC: 10.4 G/DL — LOW (ref 13–17)
MCHC RBC-ENTMCNC: 31 PG — SIGNIFICANT CHANGE UP (ref 27–34)
MCHC RBC-ENTMCNC: 31.7 % — LOW (ref 32–36)
MCV RBC AUTO: 97.6 FL — SIGNIFICANT CHANGE UP (ref 80–100)
NRBC # FLD: 0 — SIGNIFICANT CHANGE UP
PLATELET # BLD AUTO: 155 K/UL — SIGNIFICANT CHANGE UP (ref 150–400)
PMV BLD: 13.5 FL — HIGH (ref 7–13)
POTASSIUM SERPL-MCNC: 4.3 MMOL/L — SIGNIFICANT CHANGE UP (ref 3.5–5.3)
POTASSIUM SERPL-SCNC: 4.3 MMOL/L — SIGNIFICANT CHANGE UP (ref 3.5–5.3)
RBC # BLD: 3.36 M/UL — LOW (ref 4.2–5.8)
RBC # FLD: 15.4 % — HIGH (ref 10.3–14.5)
SODIUM SERPL-SCNC: 138 MMOL/L — SIGNIFICANT CHANGE UP (ref 135–145)
WBC # BLD: 10.05 K/UL — SIGNIFICANT CHANGE UP (ref 3.8–10.5)
WBC # FLD AUTO: 10.05 K/UL — SIGNIFICANT CHANGE UP (ref 3.8–10.5)

## 2017-11-12 PROCEDURE — 71010: CPT | Mod: 26

## 2017-11-12 RX ORDER — ALPRAZOLAM 0.25 MG
0.5 TABLET ORAL ONCE
Qty: 0 | Refills: 0 | Status: DISCONTINUED | OUTPATIENT
Start: 2017-11-12 | End: 2017-11-12

## 2017-11-12 RX ORDER — ONDANSETRON 8 MG/1
4 TABLET, FILM COATED ORAL ONCE
Qty: 0 | Refills: 0 | Status: COMPLETED | OUTPATIENT
Start: 2017-11-12 | End: 2017-11-12

## 2017-11-12 RX ADMIN — Medication 325 MILLIGRAM(S): at 11:31

## 2017-11-12 RX ADMIN — Medication 100 MILLIGRAM(S): at 11:35

## 2017-11-12 RX ADMIN — Medication 100 MILLIGRAM(S): at 05:49

## 2017-11-12 RX ADMIN — OXYCODONE HYDROCHLORIDE 10 MILLIGRAM(S): 5 TABLET ORAL at 19:44

## 2017-11-12 RX ADMIN — BUDESONIDE AND FORMOTEROL FUMARATE DIHYDRATE 2 PUFF(S): 160; 4.5 AEROSOL RESPIRATORY (INHALATION) at 09:41

## 2017-11-12 RX ADMIN — Medication 650 MILLIGRAM(S): at 04:29

## 2017-11-12 RX ADMIN — LEVALBUTEROL 0.63 MILLIGRAM(S): 1.25 SOLUTION, CONCENTRATE RESPIRATORY (INHALATION) at 21:34

## 2017-11-12 RX ADMIN — BUDESONIDE AND FORMOTEROL FUMARATE DIHYDRATE 2 PUFF(S): 160; 4.5 AEROSOL RESPIRATORY (INHALATION) at 21:55

## 2017-11-12 RX ADMIN — Medication 100 MILLIGRAM(S): at 18:21

## 2017-11-12 RX ADMIN — Medication 650 MILLIGRAM(S): at 09:57

## 2017-11-12 RX ADMIN — LEVALBUTEROL 0.63 MILLIGRAM(S): 1.25 SOLUTION, CONCENTRATE RESPIRATORY (INHALATION) at 09:38

## 2017-11-12 RX ADMIN — LIDOCAINE 1 PATCH: 4 CREAM TOPICAL at 11:31

## 2017-11-12 RX ADMIN — LIDOCAINE 1 PATCH: 4 CREAM TOPICAL at 12:20

## 2017-11-12 RX ADMIN — LEVALBUTEROL 0.63 MILLIGRAM(S): 1.25 SOLUTION, CONCENTRATE RESPIRATORY (INHALATION) at 15:52

## 2017-11-12 RX ADMIN — Medication 500 MICROGRAM(S): at 21:34

## 2017-11-12 RX ADMIN — Medication 325 MILLIGRAM(S): at 18:21

## 2017-11-12 RX ADMIN — Medication 0.5 MILLIGRAM(S): at 23:20

## 2017-11-12 RX ADMIN — Medication 325 MILLIGRAM(S): at 08:36

## 2017-11-12 RX ADMIN — Medication 10 MILLIGRAM(S): at 05:49

## 2017-11-12 RX ADMIN — DIVALPROEX SODIUM 500 MILLIGRAM(S): 500 TABLET, DELAYED RELEASE ORAL at 05:49

## 2017-11-12 RX ADMIN — Medication 0.5 MILLIGRAM(S): at 11:31

## 2017-11-12 RX ADMIN — ATORVASTATIN CALCIUM 80 MILLIGRAM(S): 80 TABLET, FILM COATED ORAL at 21:55

## 2017-11-12 RX ADMIN — DIVALPROEX SODIUM 500 MILLIGRAM(S): 500 TABLET, DELAYED RELEASE ORAL at 18:21

## 2017-11-12 RX ADMIN — Medication 12.5 MILLIGRAM(S): at 05:49

## 2017-11-12 RX ADMIN — LEVALBUTEROL 0.63 MILLIGRAM(S): 1.25 SOLUTION, CONCENTRATE RESPIRATORY (INHALATION) at 03:56

## 2017-11-12 RX ADMIN — PANTOPRAZOLE SODIUM 40 MILLIGRAM(S): 20 TABLET, DELAYED RELEASE ORAL at 06:08

## 2017-11-12 RX ADMIN — Medication 500 MICROGRAM(S): at 03:56

## 2017-11-12 RX ADMIN — Medication 1 MILLIGRAM(S): at 11:31

## 2017-11-12 RX ADMIN — Medication 500 MICROGRAM(S): at 15:52

## 2017-11-12 RX ADMIN — Medication 500 MICROGRAM(S): at 09:37

## 2017-11-12 RX ADMIN — Medication 100 MILLIGRAM(S): at 21:55

## 2017-11-12 RX ADMIN — Medication 650 MILLIGRAM(S): at 18:21

## 2017-11-12 RX ADMIN — OXYCODONE HYDROCHLORIDE 10 MILLIGRAM(S): 5 TABLET ORAL at 20:20

## 2017-11-12 NOTE — PROGRESS NOTE ADULT - ATTENDING COMMENTS
No hemoptysis in last 24 hours: However toughest decision would be to whether to reinitiate AC or not?

## 2017-11-12 NOTE — PROGRESS NOTE ADULT - ASSESSMENT
**Neuro**   - Pain control w/ tylenol and oxycodone  - Anxiety, low dose xanax x1  **Pulm**  - Prednisone, symbicort, atrovent, xopenex   **Cardio**   - Sinus tach most likely 2/2 anxiety, deconditioning   - Continue monitoring   - Continue lopressor   **GI**  - Regular diet, protonix  **Renal**   - Voids self, continue monitoring   **Vasc**  - Holding all AC, added SCDs for dvt prophylaxis   **Heme**  - h/h  **ID**  - No abx at this time

## 2017-11-12 NOTE — PROGRESS NOTE ADULT - SUBJECTIVE AND OBJECTIVE BOX
Patient is a 47y old  Male who presents with a chief complaint of hemoptysis (09 Nov 2017 00:32)  Pertinent ROS: OOB to chair. Fatigue (-), no hemoptysis     MEDICATIONS  (STANDING):  ALPRAZolam 0.5 milliGRAM(s) Oral once  atorvastatin 80 milliGRAM(s) Oral at bedtime  benzonatate 100 milliGRAM(s) Oral three times a day  buDESOnide 160 MICROgram(s)/formoterol 4.5 MICROgram(s) Inhaler 2 Puff(s) Inhalation two times a day  diVALproex  milliGRAM(s) Oral two times a day  docusate sodium 100 milliGRAM(s) Oral two times a day  ferrous    sulfate 325 milliGRAM(s) Oral three times a day with meals  folic acid 1 milliGRAM(s) Oral daily  ipratropium    for Nebulization 500 MICROGram(s) Nebulizer every 6 hours  levalbuterol Inhalation 0.63 milliGRAM(s) Inhalation every 6 hours  lidocaine   Patch 1 Patch Transdermal daily  metoprolol     tartrate 12.5 milliGRAM(s) Oral every 12 hours  pantoprazole    Tablet 40 milliGRAM(s) Oral before breakfast  polyethylene glycol 3350 17 Gram(s) Oral at bedtime  predniSONE   Tablet 10 milliGRAM(s) Oral daily    MEDICATIONS  (PRN):  acetaminophen   Tablet 650 milliGRAM(s) Oral every 6 hours PRN Mild Pain (1 - 3)  oxyCODONE    IR 10 milliGRAM(s) Oral every 4 hours PRN Severe Pain (7 - 10)  oxyCODONE    IR 5 milliGRAM(s) Oral every 4 hours PRN Moderate Pain (4 - 6)    Vital Signs Last 24 Hrs  T(C): 37.1 (12 Nov 2017 08:16), Max: 37.2 (12 Nov 2017 04:50)  T(F): 98.8 (12 Nov 2017 08:16), Max: 99 (12 Nov 2017 04:50)  HR: 105 (12 Nov 2017 09:39) (78 - 109)  BP: 104/71 (12 Nov 2017 08:16) (101/77 - 110/66)  BP(mean): --  RR: 16 (12 Nov 2017 08:16) (16 - 18)  SpO2: 100% (12 Nov 2017 08:16) (97% - 100%)    I&O's Summary    11 Nov 2017 07:01  -  12 Nov 2017 07:00  --------------------------------------------------------  IN: 0 mL / OUT: 730 mL / NET: -730 mL    12 Nov 2017 07:01  -  12 Nov 2017 11:31  --------------------------------------------------------  IN: 0 mL / OUT: 250 mL / NET: -250 mL    Physical Exam:   GENERAL: NAD, well-groomed, well-developed  HEENT: GIOVANI/   Atraumatic, Normocephalic  NECK: Supple, No JVD, Normal thyroid  CHEST/LUNG: coarse breath sounds with wheezing+ bilaterally without acute distress  CVS: irregular slow rate ; No murmurs, rubs, or gallops  GI: : Soft, Nontender, Nondistended; Bowel sounds present  NERVOUS SYSTEM:  Alert & Oriented X3  EXTREMITIES:  2+ Peripheral Pulses, No clubbing, cyanosis, pitting pedal edema (+) bilaterally  SKIN: Normal color, turgor. Dry   PSYCH: Appropriate. Calm     Labs:                          10.4   10.05 )-----------( 155      ( 12 Nov 2017 06:13 )             32.8     11-12    138  |  96<L>  |  15  ----------------------------<  86  4.3   |  33<H>  |  0.73    Ca    8.6      12 Nov 2017 06:13      CAPILLARY BLOOD GLUCOSE          PT/INR - ( 10 Nov 2017 13:17 )   PT: 12.9 SEC;   INR: 1.15          PTT - ( 10 Nov 2017 13:17 )  PTT:27.3 SEC    D DImer  Cultures:       Studies  Chest X-RAY < from: Xray Chest 1 View AP -PORTABLE-Routine (11.11.17 @ 09:43) >  EXAM:  RAD CHEST PORTABLE ROUTINE        PROCEDURE DATE:  Nov 11 2017         INTERPRETATION:  TIME OF EXAM: November 11, 2017 at 8:42 AM    CLINICAL INFORMATION: Hemoptysis follow-up.    TECHNIQUE:   Portable chest    INTERPRETATION:     Hyperlucent left upper lobe presumably secondary to bullous formation   unchanged. Chain sutures in left midlung field. Linear opacities   bilaterally likely due to underlying emphysema. The heart is not   enlarged. No definite effusions.      COMPARISON:  November 10 without interval change.      IMPRESSION:  Bilateral linear opacities likely due to chronic lung   disease such as emphysema with large left upper lobe bulla unchanged.    < end of copied text >    CT SCAN Chest  < from: CT Angio Chest w/ IV Cont (11.10.17 @ 11:10) >  EXAM:  CT ANGIO CHEST (W)AW IC        PROCEDURE DATE:  Nov 10 2017         INTERPRETATION:  CLINICAL INFORMATION: Hemoptysis, history of sarcoid and   aspergilloma, status post left upper lobectomy    COMPARISON: CT chest 11/3/2017    PROCEDURE:   CT Angiography of the Chest.  90 ml of Omnipaque 350 was injected intravenously. 10 ml were discarded.  Sagittal and coronal reformats were performed as well as MIPS.    FINDINGS:    CHEST:     PULMONARY ARTERY: Suboptimal study of the pulmonary artery and its distal   branches. No definite pulmonary embolism. The pulmonary artery is   enlarged, measuring 3.7 cm. No active pulmonary hemorrhage.  LUNGS AND LARGE AIRWAYS: Status post left upper lobe lobectomy. Unchanged   patchy peribronchial opacities, predominantly in the right upper lobe.   Unchanged cystic changes of the lungs bilaterally.  PLEURA: Unchanged left pneumothorax. Small left pleural effusion of mixed   density.  VESSELS: Normal left-sided aortic arch and descending aorta. No aneurysm.   No atherosclerotic changes.  HEART: Heart size is normal. Trace pericardial effusion.   MEDIASTINUM AND ERIC: No lymphadenopathy.  CHEST WALL AND LOWER NECK: Within normal limits.  VISUALIZED UPPER ABDOMEN: Bilateral nephrolithiasis without   hydronephrosis.  BONES: Unchanged left seventh rib thoracotomy defect. Mild degenerative   changes of the spine.    IMPRESSION: No active pulmonary hemorrhage.    Unchanged left pneumothorax.    Unchanged patchy peribronchial opacities and cystic changes, possibly   related to patient's known sarcoidosis.    Unchanged small left pleural effusion mixed density, possible element of   proteinaceous or hemorrhagic fluid.    < end of copied text >    CT Abdomen  Venous Dopplers: LE:   Others

## 2017-11-12 NOTE — PROGRESS NOTE ADULT - PROBLEM SELECTOR PLAN 1
? 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 ot from bronchiectasis? eliquis have been dced: needs card to se: In addition, he may need IR help with embolization, it his hemoptysis increases: He has had pleurodesis on the left side.  11/10 quantify hemoptysis. no AC. stable  11/11 no more hemoptysis. off AC  11/12 resolved. off AC

## 2017-11-12 NOTE — PROGRESS NOTE ADULT - PROBLEM SELECTOR PLAN 5
s/p pleurodesis.  11/10 s/p pleurodesis. Management defer to CTS  11/11 no intervention for now. per CTS.  11/12 no intervention planned. bronchoscopy in future per CTS

## 2017-11-12 NOTE — PROGRESS NOTE ADULT - SUBJECTIVE AND OBJECTIVE BOX
46 yo male w/ with PMHx severe sarcoid, COPD, HTN, PFO, and stroke s/p LVATS DAVID lobectomy who was recently discharged to rehab but was readmitted 2/2 hemoptysis.    Pt denies any hemoptysis last night, states he feels anxious. He admits to L sided pulling chest pain that is worse when he moves around and MEYER.       Vital Signs:  Vital Signs Last 24 Hrs  T(C): 37.1 (11-12-17 @ 08:16), Max: 37.2 (11-12-17 @ 04:50)  T(F): 98.8 (11-12-17 @ 08:16), Max: 99 (11-12-17 @ 04:50)  HR: 105 (11-12-17 @ 09:39) (78 - 109)  BP: 104/71 (11-12-17 @ 08:16) (101/77 - 110/66)  RR: 16 (11-12-17 @ 08:16) (16 - 18)  SpO2: 100% (11-12-17 @ 08:16) (97% - 100%) on (O2)    Telemetry/Alarms: Sinus tach  Gen: Aox3, no acute distress   Pulm: B/l expiratory wheeze, otherwise clear  Card: s1/s2 tachy  Abd: Soft, non-tender  LE: +2 pedal edema on L, +1 pedal edema on R. No tenderness to calf b/l  CT: None    CXR: Stable from previous x-rays, pending official review     Relevant labs, radiology and Medications reviewed                        10.4   10.05 )-----------( 155      ( 12 Nov 2017 06:13 )             32.8     11-12    138  |  96<L>  |  15  ----------------------------<  86  4.3   |  33<H>  |  0.73    Ca    8.6      12 Nov 2017 06:13      PT/INR - ( 10 Nov 2017 13:17 )   PT: 12.9 SEC;   INR: 1.15          PTT - ( 10 Nov 2017 13:17 )  PTT:27.3 SEC  Pertinent Physical Exam  I&O's Summary    11 Nov 2017 07:01  -  12 Nov 2017 07:00  --------------------------------------------------------  IN: 0 mL / OUT: 730 mL / NET: -730 mL    12 Nov 2017 07:01  -  12 Nov 2017 11:31  --------------------------------------------------------  IN: 0 mL / OUT: 250 mL / NET: -250 mL        Assessment  47y Male  w/ PAST MEDICAL & SURGICAL HISTORY:  History of pneumothorax: History of 3 prior pneumonthoracies.  COPD (chronic obstructive pulmonary disease)  Asthma  Hypertension  Sarcoidosis  History of thoracotomy: 9/24/17 Left thoracotomy, Left upper lobectomy   9/25/17 Reop left thoracotomy, evacuation of left hemothorax   Patient is a 47y old  Male who presents with a chief complaint of hemoptysis (09 Nov 2017 00:32)  .  On (Date), patient underwent .     Postoperative course/issues:                                                                                                        PLAN    **Neuro**   - Pain control w/ tylenol and oxycodone  - Anxiety, low dose xanax x1  **Pulm**  - Prednisone, symbicort, atrovent, xopenex   **Cardio**   - Sinus tach most likely 2/2 anxiety, deconditioning   - Continue monitoring   - Continue lopressor   **GI**  - Regular diet, protonix  **Renal**   - Voids self, continue monitoring   **Vasc**  - Holding all AC, added SCDs for dvt prophylaxis   **Heme**  - h/h  **ID**  - No abx at this time    PT on board, continue with active encouragement to ambulate     Discussed with Cardiothoracic Team at AM rounds.                                                                                                      Contact pager [phone]

## 2017-11-13 LAB
APPEARANCE UR: CLEAR — SIGNIFICANT CHANGE UP
APTT BLD: 28 SEC — SIGNIFICANT CHANGE UP (ref 27.5–37.4)
B PERT DNA SPEC QL NAA+PROBE: SIGNIFICANT CHANGE UP
BILIRUB UR-MCNC: NEGATIVE — SIGNIFICANT CHANGE UP
BLD GP AB SCN SERPL QL: NEGATIVE — SIGNIFICANT CHANGE UP
BLOOD UR QL VISUAL: HIGH
C PNEUM DNA SPEC QL NAA+PROBE: NOT DETECTED — SIGNIFICANT CHANGE UP
COLOR SPEC: SIGNIFICANT CHANGE UP
FLUAV H1 2009 PAND RNA SPEC QL NAA+PROBE: NOT DETECTED — SIGNIFICANT CHANGE UP
FLUAV H1 RNA SPEC QL NAA+PROBE: NOT DETECTED — SIGNIFICANT CHANGE UP
FLUAV H3 RNA SPEC QL NAA+PROBE: NOT DETECTED — SIGNIFICANT CHANGE UP
FLUAV SUBTYP SPEC NAA+PROBE: SIGNIFICANT CHANGE UP
FLUBV RNA SPEC QL NAA+PROBE: NOT DETECTED — SIGNIFICANT CHANGE UP
GLUCOSE UR-MCNC: NEGATIVE — SIGNIFICANT CHANGE UP
HADV DNA SPEC QL NAA+PROBE: NOT DETECTED — SIGNIFICANT CHANGE UP
HCOV 229E RNA SPEC QL NAA+PROBE: NOT DETECTED — SIGNIFICANT CHANGE UP
HCOV HKU1 RNA SPEC QL NAA+PROBE: NOT DETECTED — SIGNIFICANT CHANGE UP
HCOV NL63 RNA SPEC QL NAA+PROBE: NOT DETECTED — SIGNIFICANT CHANGE UP
HCOV OC43 RNA SPEC QL NAA+PROBE: NOT DETECTED — SIGNIFICANT CHANGE UP
HCT VFR BLD CALC: 36.1 % — LOW (ref 39–50)
HGB BLD-MCNC: 11.2 G/DL — LOW (ref 13–17)
HMPV RNA SPEC QL NAA+PROBE: NOT DETECTED — SIGNIFICANT CHANGE UP
HPIV1 RNA SPEC QL NAA+PROBE: NOT DETECTED — SIGNIFICANT CHANGE UP
HPIV2 RNA SPEC QL NAA+PROBE: NOT DETECTED — SIGNIFICANT CHANGE UP
HPIV3 RNA SPEC QL NAA+PROBE: NOT DETECTED — SIGNIFICANT CHANGE UP
HPIV4 RNA SPEC QL NAA+PROBE: NOT DETECTED — SIGNIFICANT CHANGE UP
INR BLD: 1.22 — HIGH (ref 0.88–1.17)
KETONES UR-MCNC: NEGATIVE — SIGNIFICANT CHANGE UP
LEUKOCYTE ESTERASE UR-ACNC: NEGATIVE — SIGNIFICANT CHANGE UP
M PNEUMO DNA SPEC QL NAA+PROBE: NOT DETECTED — SIGNIFICANT CHANGE UP
MCHC RBC-ENTMCNC: 31 % — LOW (ref 32–36)
MCHC RBC-ENTMCNC: 31 PG — SIGNIFICANT CHANGE UP (ref 27–34)
MCV RBC AUTO: 100 FL — SIGNIFICANT CHANGE UP (ref 80–100)
MUCOUS THREADS # UR AUTO: SIGNIFICANT CHANGE UP
NITRITE UR-MCNC: NEGATIVE — SIGNIFICANT CHANGE UP
NON-SQ EPI CELLS # UR AUTO: <1 — SIGNIFICANT CHANGE UP
NRBC # FLD: 0 — SIGNIFICANT CHANGE UP
PH UR: 6.5 — SIGNIFICANT CHANGE UP (ref 4.6–8)
PLATELET # BLD AUTO: 124 K/UL — LOW (ref 150–400)
PMV BLD: 13.9 FL — HIGH (ref 7–13)
PROT UR-MCNC: 10 — SIGNIFICANT CHANGE UP
PROTHROM AB SERPL-ACNC: 13.7 SEC — HIGH (ref 9.8–13.1)
RBC # BLD: 3.61 M/UL — LOW (ref 4.2–5.8)
RBC # FLD: 15.1 % — HIGH (ref 10.3–14.5)
RBC CASTS # UR COMP ASSIST: HIGH (ref 0–?)
RH IG SCN BLD-IMP: POSITIVE — SIGNIFICANT CHANGE UP
RSV RNA SPEC QL NAA+PROBE: NOT DETECTED — SIGNIFICANT CHANGE UP
RV+EV RNA SPEC QL NAA+PROBE: NOT DETECTED — SIGNIFICANT CHANGE UP
SP GR SPEC: 1.01 — SIGNIFICANT CHANGE UP (ref 1–1.03)
SQUAMOUS # UR AUTO: SIGNIFICANT CHANGE UP
UROBILINOGEN FLD QL: NORMAL E.U. — SIGNIFICANT CHANGE UP (ref 0.1–0.2)
WBC # BLD: 13.41 K/UL — HIGH (ref 3.8–10.5)
WBC # FLD AUTO: 13.41 K/UL — HIGH (ref 3.8–10.5)
WBC UR QL: SIGNIFICANT CHANGE UP (ref 0–?)

## 2017-11-13 PROCEDURE — 71010: CPT | Mod: 26

## 2017-11-13 PROCEDURE — 99232 SBSQ HOSP IP/OBS MODERATE 35: CPT

## 2017-11-13 PROCEDURE — 99232 SBSQ HOSP IP/OBS MODERATE 35: CPT | Mod: GC

## 2017-11-13 PROCEDURE — 99222 1ST HOSP IP/OBS MODERATE 55: CPT | Mod: GC

## 2017-11-13 RX ADMIN — Medication 1 MILLIGRAM(S): at 14:29

## 2017-11-13 RX ADMIN — BUDESONIDE AND FORMOTEROL FUMARATE DIHYDRATE 2 PUFF(S): 160; 4.5 AEROSOL RESPIRATORY (INHALATION) at 08:21

## 2017-11-13 RX ADMIN — Medication 100 MILLIGRAM(S): at 14:29

## 2017-11-13 RX ADMIN — ATORVASTATIN CALCIUM 80 MILLIGRAM(S): 80 TABLET, FILM COATED ORAL at 21:07

## 2017-11-13 RX ADMIN — Medication 500 MICROGRAM(S): at 15:59

## 2017-11-13 RX ADMIN — OXYCODONE HYDROCHLORIDE 5 MILLIGRAM(S): 5 TABLET ORAL at 18:44

## 2017-11-13 RX ADMIN — DIVALPROEX SODIUM 500 MILLIGRAM(S): 500 TABLET, DELAYED RELEASE ORAL at 05:03

## 2017-11-13 RX ADMIN — Medication 500 MICROGRAM(S): at 03:32

## 2017-11-13 RX ADMIN — Medication 325 MILLIGRAM(S): at 21:07

## 2017-11-13 RX ADMIN — Medication 500 MICROGRAM(S): at 10:55

## 2017-11-13 RX ADMIN — Medication 100 MILLIGRAM(S): at 21:07

## 2017-11-13 RX ADMIN — LEVALBUTEROL 0.63 MILLIGRAM(S): 1.25 SOLUTION, CONCENTRATE RESPIRATORY (INHALATION) at 03:32

## 2017-11-13 RX ADMIN — PANTOPRAZOLE SODIUM 40 MILLIGRAM(S): 20 TABLET, DELAYED RELEASE ORAL at 05:02

## 2017-11-13 RX ADMIN — Medication 325 MILLIGRAM(S): at 08:21

## 2017-11-13 RX ADMIN — Medication 100 MILLIGRAM(S): at 17:04

## 2017-11-13 RX ADMIN — Medication 10 MILLIGRAM(S): at 05:03

## 2017-11-13 RX ADMIN — Medication 325 MILLIGRAM(S): at 14:29

## 2017-11-13 RX ADMIN — DIVALPROEX SODIUM 500 MILLIGRAM(S): 500 TABLET, DELAYED RELEASE ORAL at 17:04

## 2017-11-13 RX ADMIN — BUDESONIDE AND FORMOTEROL FUMARATE DIHYDRATE 2 PUFF(S): 160; 4.5 AEROSOL RESPIRATORY (INHALATION) at 21:07

## 2017-11-13 RX ADMIN — Medication 100 MILLIGRAM(S): at 05:02

## 2017-11-13 RX ADMIN — Medication 500 MICROGRAM(S): at 21:28

## 2017-11-13 RX ADMIN — Medication 12.5 MILLIGRAM(S): at 17:04

## 2017-11-13 RX ADMIN — LIDOCAINE 1 PATCH: 4 CREAM TOPICAL at 17:04

## 2017-11-13 RX ADMIN — LEVALBUTEROL 0.63 MILLIGRAM(S): 1.25 SOLUTION, CONCENTRATE RESPIRATORY (INHALATION) at 21:29

## 2017-11-13 RX ADMIN — Medication 650 MILLIGRAM(S): at 05:03

## 2017-11-13 RX ADMIN — LEVALBUTEROL 0.63 MILLIGRAM(S): 1.25 SOLUTION, CONCENTRATE RESPIRATORY (INHALATION) at 15:59

## 2017-11-13 RX ADMIN — LEVALBUTEROL 0.63 MILLIGRAM(S): 1.25 SOLUTION, CONCENTRATE RESPIRATORY (INHALATION) at 10:55

## 2017-11-13 RX ADMIN — Medication 12.5 MILLIGRAM(S): at 05:03

## 2017-11-13 RX ADMIN — OXYCODONE HYDROCHLORIDE 5 MILLIGRAM(S): 5 TABLET ORAL at 09:58

## 2017-11-13 RX ADMIN — OXYCODONE HYDROCHLORIDE 5 MILLIGRAM(S): 5 TABLET ORAL at 10:50

## 2017-11-13 NOTE — PROGRESS NOTE ADULT - ATTENDING COMMENTS
No hemoptysis in last 24 hours: However toughest decision would be to whether to reinitiate AC or not?    11/13: Difficult situation: having hemoptysis but needs AC, which can not be started!! HAS pfo TOO AS WELL AS paf : : :   HAD LEFT UPPER LOBECTOMY FOR HEMOPTYSIS with extensive sarcoid fibrosis of lungs: with postoperative course pretty prolonged complicated by persistent ptx!: Had blood patch as well as talc pleurodesis!! Now with ptx and hemoptysis again:

## 2017-11-13 NOTE — ADVANCED PRACTICE NURSE CONSULT - REASON FOR CONSULT
Patient seen on skin care rounds after wound care referral received for assessment of skin impairment and recommendations of topical management. Chart reviewed: Serum albumin 3.6g/dL, serum WBC 13.41, Griffin range 19-21, BMI 23.1kg/m2, patient and visitor at beside interviewed: reports that they have been using zinc oxide on skin breakdown and there were other scattered areas of skin breakdown in the past that have improved with use of zinc oxide. Patient H/O sarcoidosis, HTN, asthma, and COPD. Patient presented to [facility] after rehab send patient with hemoptysis, patient had 2 episodes of hemoptysis, less than 10cc in total for 24 hours. Seen and followed by pulmonary, neurology and cardiology services.

## 2017-11-13 NOTE — PROGRESS NOTE ADULT - PROBLEM SELECTOR PLAN 3
on prednisone.  11/10 continue steroid  11/11 on steroid  11/12 on oral steroid  11/13: cont oral steroids!!

## 2017-11-13 NOTE — CONSULT NOTE ADULT - SUBJECTIVE AND OBJECTIVE BOX
HPI:  47 year old male with a PMHx of sarcoidosis, and COPD s/p andree resection with path suggestive of aspergilosis.  S/p treatment with voriconazole for 6 weeks.    Presents with recurrent hemoptysis from [facility].    Last night has temp to 102.    Overall, has had recent recurrence of hemoptyisis.  Mucus is bloody.  He states that he feels fatigues but overall about the same as two weeks ago.        PAST MEDICAL & SURGICAL HISTORY:  History of pneumothorax: History of 3 prior pneumonthoracies.  COPD (chronic obstructive pulmonary disease)  Asthma  Hypertension  Sarcoidosis  History of thoracotomy: 17 Left thoracotomy, Left upper lobectomy   17 Reop left thoracotomy, evacuation of left hemothorax      Allergies    No Known Allergies    Intolerances        ANTIMICROBIALS:      OTHER MEDS:  acetaminophen   Tablet 650 milliGRAM(s) Oral every 6 hours PRN  atorvastatin 80 milliGRAM(s) Oral at bedtime  benzonatate 100 milliGRAM(s) Oral three times a day  buDESOnide 160 MICROgram(s)/formoterol 4.5 MICROgram(s) Inhaler 2 Puff(s) Inhalation two times a day  diVALproex  milliGRAM(s) Oral two times a day  docusate sodium 100 milliGRAM(s) Oral two times a day  ferrous    sulfate 325 milliGRAM(s) Oral three times a day with meals  folic acid 1 milliGRAM(s) Oral daily  HYDROcodone/homatropine Syrup 5 milliLiter(s) Oral every 6 hours PRN  ipratropium    for Nebulization 500 MICROGram(s) Nebulizer every 6 hours  levalbuterol Inhalation 0.63 milliGRAM(s) Inhalation every 6 hours  lidocaine   Patch 1 Patch Transdermal daily  metoprolol     tartrate 12.5 milliGRAM(s) Oral every 12 hours  ondansetron Injectable 4 milliGRAM(s) IV Push once  oxyCODONE    IR 10 milliGRAM(s) Oral every 4 hours PRN  oxyCODONE    IR 5 milliGRAM(s) Oral every 4 hours PRN  pantoprazole    Tablet 40 milliGRAM(s) Oral before breakfast  polyethylene glycol 3350 17 Gram(s) Oral at bedtime  predniSONE   Tablet 10 milliGRAM(s) Oral daily      SOCIAL HISTORY: no tobacco, no drug use    FAMILY HISTORY:  Family history of pancreatic cancer  Family history of essential hypertension (Father)      REVIEW OF SYSTEMS  [  ] ROS unobtainable because:    [  ] All other systems negative except as noted below:	    Constitutional:  [ x] fever [ ] weight loss  Skin:  [ ] rash [ ] phlebitis	  Eyes: [ ] icterus [ ] inflammation	  ENMT: [ ] discharge [ ] thrush [ ] ulcers [ ] exudates  Respiratory: [x ] dyspnea [x ] hemoptysis [x ] cough [ x] sputum	  Cardiovascular:  [ ] chest pain [ ] palpitations [ ] edema	  Gastrointestinal:  [ ] nausea [ ] vomiting [ ] diarrhea [ ] constipation [ ] pain	  Genitourinary:  [ ] dysuria [ ] frequency [ ] hematuria [ ] discharge [ ] flank pain  Musculoskeletal:  [ ] myalgias [ ] arthralgias [ ] arthritis	  Neurological:  [ ] headache [ ] seizures	  Psychiatric:  [ ] anxiety [ ] depression	  Hematology/Lymphatics:  [ ] lymphadenopathy  Endocrine:  [ ] adrenal [ ] thyroid  Allergic/Immunologic:	 [ ] transplant [ ] seasonal    PHYSICAL EXAM:  General: [ x] non-toxic  HEAD/EYES: [ ] PERRL [ x] white sclera [ ] icterus  ENT:  [ ] normal [x ] supple [ ] thrush [ ] pharyngeal exudate  Cardiovascular:   [ ] murmur [x ] normal [ ] PPM/AICD  Respiratory:  [x ] course BS  GI:  [x ] soft, non-tender, normal bowel sounds  :  [ ] mock [ ] no CVA tenderness   Musculoskeletal:  [ ] no synovitis  Neurologic:  [x ] non-focal exam   Skin:  x[ ] no rash  Lymph: [ ] no lymphadenopathy  Psychiatric:  [ x] appropriate affect [ ] alert & oriented  Lines:  [x ] no phlebitis [ ] central line          Drug Dosing Weight  Height (cm): 180.34 (10 Nov 2017 22:54)  Weight (kg): 75 (10 Nov 2017 22:54)  BMI (kg/m2): 23.1 (10 Nov 2017 22:54)  BSA (m2): 1.95 (10 Nov 2017 22:54)    Vital Signs Last 24 Hrs  T(F): 98.7 (17 @ 11:23), Max: 101.4 (17 @ 04:53)    Vital Signs Last 24 Hrs  HR: 109 (17 @ 11:23) (109 - 142)  BP: 120/78 (17 @ 11:23) (90/45 - 120/78)  RR: 18 (17 @ 11:23)  SpO2: 100% (17 @ 11:23) (96% - 100%)  Wt(kg): --                          11.2   13.41 )-----------( 124      ( 2017 11:29 )             36.1           138  |  96<L>  |  15  ----------------------------<  86  4.3   |  33<H>  |  0.73    Ca    8.6      2017 06:13        Urinalysis Basic - ( 2017 12:00 )    Color: PLYEL / Appearance: CLEAR / S.014 / pH: 6.5  Gluc: NEGATIVE / Ketone: NEGATIVE  / Bili: NEGATIVE / Urobili: NORMAL E.U.   Blood: TRACE / Protein: 10 / Nitrite: NEGATIVE   Leuk Esterase: NEGATIVE / RBC: 5-10 / WBC 2-5   Sq Epi: OCC / Non Sq Epi: x / Bacteria: x        MICROBIOLOGY:    RADIOLOGY:  < from: CT Angio Chest w/ IV Cont (11.10.17 @ 11:10) >  IMPRESSION: No active pulmonary hemorrhage.    Unchanged left pneumothorax.    Unchanged patchy peribronchial opacities and cystic changes, possibly   related to patient's known sarcoidosis.    Unchanged small left pleural effusion mixed density, possible element of   proteinaceous or hemorrhagic fluid.    < end of copied text >

## 2017-11-13 NOTE — PROGRESS NOTE ADULT - SUBJECTIVE AND OBJECTIVE BOX
HPI:  Mr. Dickens is a 47 year old M with PMHx of sarcoidosis, HTN, COPD, and asthma. He is known to our service from recent prolonged admission from 9/10/17-17. He had presented with severe leg spasms. 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 Also + rhino/enterovirus, COPD exacerbation, intubation. 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 Followed by ID, on voriconazole until . S/p chest tube, blood patch procedure, and eventual pleurodesis for air leak.      Discharged to [facility] inpatient rehab on . Per discussion with [facility], patient able to tolerate therapy, on O2 via NC. Tolerated room air during the daytime for a few hours at rest. CT chest 11/3 at [facility] showed left pneumothorax, small left pleural effusion. Transferred to [facility]  due to hemoptysis (per report approx 1/4 cup blood in several hours). Eliquis on hold since admission to [facility].  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 CTA chest 11/10--> No active pulmonary hemorrhage. Unchanged left pneumothorax. Unchanged patchy peribronchial opacities and cystic changes, possibly related to patient's known sarcoidosis.   Unchanged small left pleural effusion mixed density, possible element of proteinaceous or hemorrhagic fluid. No intervention at this time, per CTS may need bronchoscopy in future. Cardiology recommend systemic AC for Afib, recommend heparin to coumadin bridge, state would not pursue PFO closure.     Seen and examined. Patient reports continued hemoptysis. Reports feeling down as he was anticipating going home.     REVIEW OF SYSTEMS  Constitutional - No fever, No fatigue  HEENT - No visual disturbances, No difficulty hearing,  No neck pain  Respiratory - As above  Cardiovascular - No chest pain, No palpitations  Gastrointestinal - No abdominal pain, No nausea, No vomiting, No diarrhea, No constipation  Genitourinary - No dysuria, No frequency, No hematuria, No incontinence  Neurological - No headaches, No loss of strength, No numbness  Skin - No itching, No rashes  Endocrine - No temperature intolerance  Musculoskeletal - No joint pain, No joint swelling, No muscle pain  Psychiatric - As above   All other review of systems negative    PAST MEDICAL & SURGICAL HISTORY  History of pneumothorax  COPD (chronic obstructive pulmonary disease)  Asthma  Hypertension  Sarcoidosis diagnosed 2006  Pulmonary aspergillosis at [facility] on biopsy (which ultimately was complicated by post-op hemoptysis).   Thoracotomy, lobectomy as above    FUNCTIONAL HISTORY  Left hand dominant  Lives alone in an apartment on 6th floor. + Elevator. Prior to recent hospitalization starting in 2017 reports had to take his time if going up 6 flights of stairs. Did not require home O2.  Independent in ambulation, ADL's, transfers prior to recent prolonged hospitalization  Pt on disability; [facility]    CURRENT FUNCTIONAL STATUS  Transfers - Min A  Gait - 50' RW min A    FAMILY HISTORY   Reviewed and non-contributory    ALLERGIES  No Known Allergies    VITALS  T(C): 37.1 (17 @ 11:23)  T(F): 98.7 (17 @ 11:23), Max: 101.4 (17 @ 04:53)  HR: 109 (17 @ 11:23) (109 - 142)  BP: 120/78 (17 @ 11:23) (90/45 - 120/78)  RR:  (18 - 22)  SpO2:  (96% - 100%)  Wt(kg): --    PHYSICAL EXAM  Constitutional - NAD, Comfortable  HEENT - NCAT, EOMI  Neck - Supple  Chest - Coarse, O2 via NC  Cardiovascular - RRR  Abdomen - BS+, Soft, NTND  Extremities - LE edema  Neurologic Exam -                    Cognitive - Awake, Alert     Communication - Fluent, No dysarthria     Cranial Nerves - CN 2-12 grossly intact     Motor -                     LEFT    UE - ShAB 3/5, EF 4/5, EE 4/5, WE 3+/5,  4/5                    RIGHT UE - ShAB 5/5, EF 5/5, EE 5/5, WE 5/5,  5/5                    LEFT    LE - HF 5/5, KE 5/5, DF 5/5, PF 5/5                    RIGHT LE - HF 5/5, KE 5/5, DF 5/5, PF 5/5        Sensory - Intact to light touch     Reflexes - DTR intact and symmetrical  Psychiatric - Anxious, depressed     RECENT LABS/IMAGING                        11.2   13.41 )-----------( 124      ( 2017 11:29 )             36.1     11-12    138  |  96<L>  |  15  ----------------------------<  86  4.3   |  33<H>  |  0.73    Ca    8.6      2017 06:13      PT/INR - ( 2017 11:29 )   PT: 13.7 SEC;   INR: 1.22          PTT - ( 2017 11:29 )  PTT:28.0 SEC  Urinalysis Basic - ( 2017 12:00 )    Color: PLYEL / Appearance: CLEAR / S.014 / pH: 6.5  Gluc: NEGATIVE / Ketone: NEGATIVE  / Bili: NEGATIVE / Urobili: NORMAL E.U.   Blood: TRACE / Protein: 10 / Nitrite: NEGATIVE   Leuk Esterase: NEGATIVE / RBC: 5-10 / WBC 2-5   Sq Epi: OCC / Non Sq Epi: x / Bacteria: x      MEDICATIONS   MEDICATIONS  (STANDING):  atorvastatin 80 milliGRAM(s) Oral at bedtime  benzonatate 100 milliGRAM(s) Oral three times a day  buDESOnide 160 MICROgram(s)/formoterol 4.5 MICROgram(s) Inhaler 2 Puff(s) Inhalation two times a day  diVALproex  milliGRAM(s) Oral two times a day  docusate sodium 100 milliGRAM(s) Oral two times a day  ferrous    sulfate 325 milliGRAM(s) Oral three times a day with meals  folic acid 1 milliGRAM(s) Oral daily  ipratropium    for Nebulization 500 MICROGram(s) Nebulizer every 6 hours  levalbuterol Inhalation 0.63 milliGRAM(s) Inhalation every 6 hours  lidocaine   Patch 1 Patch Transdermal daily  metoprolol     tartrate 12.5 milliGRAM(s) Oral every 12 hours  ondansetron Injectable 4 milliGRAM(s) IV Push once  pantoprazole    Tablet 40 milliGRAM(s) Oral before breakfast  polyethylene glycol 3350 17 Gram(s) Oral at bedtime  predniSONE   Tablet 10 milliGRAM(s) Oral daily    MEDICATIONS  (PRN):  acetaminophen   Tablet 650 milliGRAM(s) Oral every 6 hours PRN Mild Pain (1 - 3)  HYDROcodone/homatropine Syrup 5 milliLiter(s) Oral every 6 hours PRN Cough  oxyCODONE    IR 10 milliGRAM(s) Oral every 4 hours PRN Severe Pain (7 - 10)  oxyCODONE    IR 5 milliGRAM(s) Oral every 4 hours PRN Moderate Pain (4 - 6)      ASSESSMENT/PLAN  46 y/o M known to our service with recent prolonged admission for bilateral MCA and R PCA CVA, hemoptysis/aspergillosis s/p DAVID thoracotomy, discharged to [facility] rehab and readmitted  for hemoptysis. Patient with left hemiparesis, deconditioning, compromised pulmonary status, functional, gait, ADL impairments.    -Disposition -  Recommend ACUTE inpatient rehabilitation, ONCE MEDICALLY OPTIMIZED, for the functional deficits consisting of 3 hours of therapy/day & 24 hour RN/daily PMR physician for comorbid medical management. Will continue to follow for ongoing rehab needs and recommendations.     -PT/OT - ROM, Bed mobility, Transfers, Ambulation with assistive device, ROM.   -Pain control - On ocyodone prn, lidocaine patch  -Precautions - Falls, Cardiac, pulmonary, seizure  -DVT Prophylaxis - Eliquis on hold   -Diet - Regular    Patient seen and examined and rehab plan discussed with attending Dr. Bedoya.

## 2017-11-13 NOTE — PROGRESS NOTE ADULT - PROBLEM SELECTOR PLAN 5
s/p pleurodesis.  11/10 s/p pleurodesis. Management defer to CTS  11/11 no intervention for now. per CTS.  11/13: has mod pneumothorax stable: defer to thoracic for any intervention!  11/12 no intervention planned. bronchoscopy in future per CTS

## 2017-11-13 NOTE — PROGRESS NOTE ADULT - SUBJECTIVE AND OBJECTIVE BOX
Subjective: "I feel very tired and worn out. THis cough is constant and driving me crazy" Pt also states increase in amount of hemoptysis. 2 tsp in 2 different cups of kwaku hemoptysis noted at bedside this morning. Denies CP. Some SOB, wearing O2 at times. In no distress. Also states anxiety. Denies palpitations. Called again for MRI of head, pt. adamantly refusing at this time. Will cancel for now. Girlfriend at bedside.     Vital Signs:  Vital Signs Last 24 Hrs  T(C): 36.9 (11-13-17 @ 16:57), Max: 38.6 (11-13-17 @ 04:53)  T(F): 98.5 (11-13-17 @ 16:57), Max: 101.4 (11-13-17 @ 04:53)  HR: 110 (11-13-17 @ 16:57) (106 - 142)  BP: 115/87 (11-13-17 @ 16:57) (90/45 - 120/78)  RR: 20 (11-13-17 @ 16:57) (18 - 22)  SpO2: 100% (11-13-17 @ 16:57) (96% - 100%) on (O2)    Telemetry/Alarms: Tele ST 120s.   General: WN/WD NAD  Neurology: Awake, nonfocal, BETH x 4  Eyes: Scleras clear, PERRLA/ EOMI, Gross vision intact  ENT:Gross hearing intact, grossly patent pharynx, no stridor  Neck: Neck supple, trachea midline, No JVD,   Respiratory: CTA B/L, No wheezing, rales, rhonchi  CV: RRR, S1S2, no murmurs, rubs or gallops  Abdominal: Soft, NT, ND +BS,   Extremities: No edema, + peripheral pulses  Skin: No Rashes, Hematoma, Ecchymosis  Lymphatic: No Neck, axilla, groin LAD  Psych: Oriented x 3, normal affect  Incisions:   Tubes:  Relevant labs, radiology and Medications reviewed                        11.2 13.41 )-----------( 124      ( 13 Nov 2017 11:29 )             36.1     11-12    138  |  96<L>  |  15  ----------------------------<  86  4.3   |  33<H>  |  0.73    Ca    8.6      12 Nov 2017 06:13      PT/INR - ( 13 Nov 2017 11:29 )   PT: 13.7 SEC;   INR: 1.22          PTT - ( 13 Nov 2017 11:29 )  PTT:28.0 SEC  MEDICATIONS  (STANDING):  atorvastatin 80 milliGRAM(s) Oral at bedtime  benzonatate 100 milliGRAM(s) Oral three times a day  buDESOnide 160 MICROgram(s)/formoterol 4.5 MICROgram(s) Inhaler 2 Puff(s) Inhalation two times a day  diVALproex  milliGRAM(s) Oral two times a day  docusate sodium 100 milliGRAM(s) Oral two times a day  ferrous    sulfate 325 milliGRAM(s) Oral three times a day with meals  folic acid 1 milliGRAM(s) Oral daily  ipratropium    for Nebulization 500 MICROGram(s) Nebulizer every 6 hours  levalbuterol Inhalation 0.63 milliGRAM(s) Inhalation every 6 hours  lidocaine   Patch 1 Patch Transdermal daily  metoprolol     tartrate 12.5 milliGRAM(s) Oral every 12 hours  ondansetron Injectable 4 milliGRAM(s) IV Push once  pantoprazole    Tablet 40 milliGRAM(s) Oral before breakfast  polyethylene glycol 3350 17 Gram(s) Oral at bedtime  predniSONE   Tablet 10 milliGRAM(s) Oral daily    MEDICATIONS  (PRN):  acetaminophen   Tablet 650 milliGRAM(s) Oral every 6 hours PRN Mild Pain (1 - 3)  HYDROcodone/homatropine Syrup 5 milliLiter(s) Oral every 6 hours PRN Cough  oxyCODONE    IR 10 milliGRAM(s) Oral every 4 hours PRN Severe Pain (7 - 10)  oxyCODONE    IR 5 milliGRAM(s) Oral every 4 hours PRN Moderate Pain (4 - 6)    Pertinent Physical Exam  I&O's Summary    12 Nov 2017 07:01  -  13 Nov 2017 07:00  --------------------------------------------------------  IN: 0 mL / OUT: 600 mL / NET: -600 mL    13 Nov 2017 07:01  -  13 Nov 2017 17:00  --------------------------------------------------------  IN: 0 mL / OUT: 650 mL / NET: -650 mL        Assessment  47y Male  w/ PAST MEDICAL & SURGICAL HISTORY:  History of pneumothorax: History of 3 prior pneumonthoracies.  COPD (chronic obstructive pulmonary disease)  Asthma  Hypertension  Sarcoidosis  History of thoracotomy: 9/24/17 Left thoracotomy, Left upper lobectomy   9/25/17 Reop left thoracotomy, evacuation of left hemothorax  admitted with complaints of Patient is a 47y old  Male who presents with a chief complaint of hemoptysis (09 Nov 2017 00:32)  .  On (Date), patient underwent . Postoperative course/issues:    PLAN  Neuro: Pain management  Pulm: Encourage coughing, deep breathing and use of incentive spirometry. Wean off supplemental oxygen as able. Daily CXR.   Cardio: Monitor telemetry/alarms  GI: Tolerating diet. Continue stool softeners.  Renal: monitor urine output, supplement electrolytes as needed  Vasc: Heparin SC/SCDs for DVT prophylaxis  Heme: Stable H/H. .   ID: Off antibiotics. Stable.  Therapy: OOB/ambulate  Tubes: Monitor Chest tube output  Disposition: Aim to D/C to home on  Discussed with Cardiothoracic Team at AM rounds. Subjective: "I feel very tired and worn out. THis cough is constant and driving me crazy" Pt also states increase in amount of hemoptysis. 2 tsp in 2 different cups of kwaku hemoptysis noted at bedside this morning. Denies CP. Some SOB, wearing O2 at times. In no distress. Also states anxiety. Denies palpitations. Called again for MRI of head, pt. adamantly refusing at this time. Will cancel for now. Girlfriend at bedside.     Vital Signs:  Vital Signs Last 24 Hrs  T(C): 36.9 (11-13-17 @ 16:57), Max: 38.6 (11-13-17 @ 04:53)  T(F): 98.5 (11-13-17 @ 16:57), Max: 101.4 (11-13-17 @ 04:53)  HR: 110 (11-13-17 @ 16:57) (106 - 142)  BP: 115/87 (11-13-17 @ 16:57) (90/45 - 120/78)  RR: 20 (11-13-17 @ 16:57) (18 - 22)  SpO2: 100% (11-13-17 @ 16:57) (96% - 100%) on (O2)    Telemetry/Alarms: Tele ST 120s.   General: WN/WD NAD  Neurology: Awake, nonfocal, BETH x 4, Left UE with decreased strength 4/5 to left UE  Eyes: Scleras clear, PERRLA/ EOMI, Gross vision intact  ENT:Gross hearing intact, grossly patent pharynx, no stridor  Neck: Neck supple, trachea midline, No JVD,   Respiratory: decreased throughout, some rhonchi. + hemoptysis. Occasional O2 use.   CV: RRR, S1S2, no murmurs, rubs or gallops. No Afib.   Abdominal: Soft, NT, ND +BS, +BM  Extremities: 2+ bilat pedal edema, + peripheral pulses  Skin: No Rashes, Hematoma, Ecchymosis  Lymphatic: No Neck, axilla, groin LAD  Psych: Oriented x 3, normal affect  Incisions: Old left Thoracotomy healed, BENTLEY  Tubes:none  Relevant labs, radiology and Medications reviewed            EXAM:  RAD CHEST PORTABLE ROUTINE        PROCEDURE DATE:  Nov 13 2017         INTERPRETATION:  Clinical information: Status post left thoracotomy.    Findings: Portable frontal view of the chest dated 11/13/2017 is compared   to 11/12/2017. Moderate left pneumothorax is unchanged. Trace bilateral   pleural effusions are stable. Bilateral upper lobe scarring is also   stable. Cardiomediastinal silhouette cannot be evaluated.    Impression: Stable moderate left pneumothorax.                  NURA GUERRERO M.D., ATTENDING RADIOLOGIST  This document has been electronically signed. Nov 13 2017 11:35AM                            11.2   13.41 )-----------( 124      ( 13 Nov 2017 11:29 )             36.1     11-12    138  |  96<L>  |  15  ----------------------------<  86  4.3   |  33<H>  |  0.73    Ca    8.6      12 Nov 2017 06:13      PT/INR - ( 13 Nov 2017 11:29 )   PT: 13.7 SEC;   INR: 1.22          PTT - ( 13 Nov 2017 11:29 )  PTT:28.0 SEC  MEDICATIONS  (STANDING):  atorvastatin 80 milliGRAM(s) Oral at bedtime  benzonatate 100 milliGRAM(s) Oral three times a day  buDESOnide 160 MICROgram(s)/formoterol 4.5 MICROgram(s) Inhaler 2 Puff(s) Inhalation two times a day  diVALproex  milliGRAM(s) Oral two times a day  docusate sodium 100 milliGRAM(s) Oral two times a day  ferrous    sulfate 325 milliGRAM(s) Oral three times a day with meals  folic acid 1 milliGRAM(s) Oral daily  ipratropium    for Nebulization 500 MICROGram(s) Nebulizer every 6 hours  levalbuterol Inhalation 0.63 milliGRAM(s) Inhalation every 6 hours  lidocaine   Patch 1 Patch Transdermal daily  metoprolol     tartrate 12.5 milliGRAM(s) Oral every 12 hours  ondansetron Injectable 4 milliGRAM(s) IV Push once  pantoprazole    Tablet 40 milliGRAM(s) Oral before breakfast  polyethylene glycol 3350 17 Gram(s) Oral at bedtime  predniSONE   Tablet 10 milliGRAM(s) Oral daily    MEDICATIONS  (PRN):  acetaminophen   Tablet 650 milliGRAM(s) Oral every 6 hours PRN Mild Pain (1 - 3)  HYDROcodone/homatropine Syrup 5 milliLiter(s) Oral every 6 hours PRN Cough  oxyCODONE    IR 10 milliGRAM(s) Oral every 4 hours PRN Severe Pain (7 - 10)  oxyCODONE    IR 5 milliGRAM(s) Oral every 4 hours PRN Moderate Pain (4 - 6)    Pertinent Physical Exam  I&O's Summary    12 Nov 2017 07:01  -  13 Nov 2017 07:00  --------------------------------------------------------  IN: 0 mL / OUT: 600 mL / NET: -600 mL    13 Nov 2017 07:01  -  13 Nov 2017 17:00  --------------------------------------------------------  IN: 0 mL / OUT: 650 mL / NET: -650 mL        Assessment  47y Male  w/ PAST MEDICAL & SURGICAL HISTORY:  History of pneumothorax: History of 3 prior pneumonthoracies.  COPD (chronic obstructive pulmonary disease)  Asthma  Hypertension  Sarcoidosis  History of thoracotomy: 9/24/17 Left thoracotomy, Left upper lobectomy   9/25/17 Reop left thoracotomy, evacuation of left hemothorax  admitted with complaints of Patient is a 47y old  Male who presents with a chief complaint of hemoptysis (09 Nov 2017 00:32)  47 year old male with a PMHx of sarcoidosis, HTN, asthma, and COPD presents to the ED with severe leg spasms and headache leading to a presyncopal episode, found to have a significant abnormal EKG and moderate inspiratory and expiratory wheezing in the setting of Entero/Rhino virus, admitted to tele for Near Syncope, r/o ACS and COPD exacerbation.  Hospital course complicated by stroke and PAF- AC on hold due to hemoptysis; 9/21-FB/BAL done by Dr. Arias. Intraop: Bleeding from DAVID (non-lingular)-Recom IR to embolize; 9/22 CT angio done per IR Lobko request.  Formal PFTS and ask Card to fix PFO now to decrease blood flow; 9/24- Developed worsening hemoptysis. Emergently kristel to OR and had  Left thoractomy DAVID. Brought to CTICU.  9/24: 1400ml EBL in OR; 10 u PRBCs;9/25-Brought back to OR for bleeding. Had Reop Thoracotomy, evac left hemothorax. CTI- DAVID space despite -40 Sxn, MEYER, L Hemiparesis (CVA) better w walking; Patient post op with prolongd air leak shila ptx, bedside pleurodesis done.  Developed SIRS after pleurodesis-> CTICU; On pressors. Pt. clinically improved in CTICU. Maintained on Steroids, weaned down to Solumedrol BID. Off pressors and transitioned to Midodrine. Still with thrombocytopenia but no bleeding. Continued on Eliquis but no ASA for h/o CVA. Neuro deficits improving.  Air leak resolved on 10/27. 10/30 Chest tube removed, cxr stable.  Steriods to po taper.  Patient stable for discharge.  11/2-Pt ambulating w PT frequently. Feels well. Pain controlled. All labs and vital signs stable. Wound healing. Arnold po steroid taper. To complete Voriconazole on Nov 4th. CXRs and reports reviewed by and cleared by Dr. Phoenix. Pt. to fu for PFO closure as output. Still requires intermittent oxygen. Cleared for discharge by Dr. Phoenix to acute rehab.    Patient presented to [facility] after rehab send patient with hemoptysis, patient had 2 episodes of hemoptysis, less than 10cc in total for 24 hours.  Patient states it happens when he coughs, blood tinged sputum.  Patient otherwise doing well in rehab. Since admission pt w intermittent hemopytsis. All A/c put on hold. Seen by Neuro and Pulmonary and Cardiology. No plans for PFO closure. MRI ordered by Neuro. CTA done to localize source of bleeding,     PLAN  Neuro: Pain management. Will hold off on MRI, pt. refusing.   Pulm: Encourage coughing, deep breathing and use of incentive spirometry. Wean off supplemental oxygen as able. Daily CXR. Monitor hemoptysis. No plans for Bronch or IR procedure at this time per Dr. Phoenix. Will monitor closely. Will make NPO for possible Bronch in am pending clinical status. FU Pulm reccs.   Cardio: Monitor telemetry/alarms. Will cont to hold A/c for now due to hemoptysis per Dr. Phoenix.  No plans for PFO closure.   GI: Tolerating diet. Continue stool softeners. Encourage po intake.   Renal: monitor urine output, supplement electrolytes as needed  Vasc: NO Heparin SQ for now for blding. SCDs for DVT prophylaxis  Heme: Stable H/H. trend daily. Transfuse as needed.   ID: Pt. spiked temp 101.4, ID consult called. Pan culture sent. Will hold off on Vanco/Zosyn for now per Dr. phoenix and observe. Monitor increased leukocytosis.   Therapy: OOB/ambulate. Eventual rehab  Disposition: Condition guarded.   Discussed with Cardiothoracic Team at AM rounds.

## 2017-11-13 NOTE — ADVANCED PRACTICE NURSE CONSULT - RECOMMEDATIONS
Sacral fold: Cleanse with skin cleanser, pat dry. Apply TRIAD paste twice a day.    Continue low air loss bed therapy, continue moisture management with barrier paste & single breathable pad.    Please call wound care service line is further assistance is needed ([phone]).

## 2017-11-13 NOTE — CONSULT NOTE ADULT - ASSESSMENT
47 year old with sarcoidosis and COPD presents after recent andree surgery with recurrent hemoptysis.     At this time, he also has fever.    His CT has peribronchial changes.    I would check an RVP    Can give a trial of antibiotics with vanco/ zosyn    ? bronch to evaluate hemoptysis and check deep cultures.     Hold empiric antifungals at this time.

## 2017-11-13 NOTE — ADVANCED PRACTICE NURSE CONSULT - ASSESSMENT
A&Ox4, sitting on edge of bed during initial encounter, patient able to stand for assessment, unable to lay flat or turn on side due to SOB, continent of urine and stool. Skin warm, dry with increased moisture in intertriginous folds, adequate skin turgor.    Moisture associated dermatitis of b/l buttock: Moisture is present with 2 areas of denuded epidermis: when patient is in natural anatomical position there are two symmetrical "kissing" lesions that are created. Right buttock denuded epidermis measures 0.5cmx0.5cmx0.1cm, irregular borders. And left buttock lesion measures 0.3cmx0.3cmx0.1cm, irregular borders. Both lesions are superficial with exposed red, moist dermis. Visitor at bedside reports that there were other areas like these, but the skin breakdown has improved with application of zinc oxide.

## 2017-11-13 NOTE — PROGRESS NOTE ADULT - SUBJECTIVE AND OBJECTIVE BOX
Patient is a 47y old  Male who presents with a chief complaint of hemoptysis (2017 00:32)    having hemoptysis     Any change in ROS:     MEDICATIONS  (STANDING):  atorvastatin 80 milliGRAM(s) Oral at bedtime  benzonatate 100 milliGRAM(s) Oral three times a day  buDESOnide 160 MICROgram(s)/formoterol 4.5 MICROgram(s) Inhaler 2 Puff(s) Inhalation two times a day  diVALproex  milliGRAM(s) Oral two times a day  docusate sodium 100 milliGRAM(s) Oral two times a day  ferrous    sulfate 325 milliGRAM(s) Oral three times a day with meals  folic acid 1 milliGRAM(s) Oral daily  ipratropium    for Nebulization 500 MICROGram(s) Nebulizer every 6 hours  levalbuterol Inhalation 0.63 milliGRAM(s) Inhalation every 6 hours  lidocaine   Patch 1 Patch Transdermal daily  metoprolol     tartrate 12.5 milliGRAM(s) Oral every 12 hours  ondansetron Injectable 4 milliGRAM(s) IV Push once  pantoprazole    Tablet 40 milliGRAM(s) Oral before breakfast  polyethylene glycol 3350 17 Gram(s) Oral at bedtime  predniSONE   Tablet 10 milliGRAM(s) Oral daily    MEDICATIONS  (PRN):  acetaminophen   Tablet 650 milliGRAM(s) Oral every 6 hours PRN Mild Pain (1 - 3)  HYDROcodone/homatropine Syrup 5 milliLiter(s) Oral every 6 hours PRN Cough  oxyCODONE    IR 10 milliGRAM(s) Oral every 4 hours PRN Severe Pain (7 - 10)  oxyCODONE    IR 5 milliGRAM(s) Oral every 4 hours PRN Moderate Pain (4 - 6)    Vital Signs Last 24 Hrs  T(C): 37.1 (2017 11:23), Max: 38.6 (2017 04:53)  T(F): 98.7 (2017 11:23), Max: 101.4 (2017 04:53)  HR: 106 (2017 15:59) (106 - 142)  BP: 120/78 (2017 11:23) (90/45 - 120/78)  BP(mean): --  RR: 18 (2017 11:23) (18 - 22)  SpO2: 98% (2017 15:59) (96% - 100%)    I&O's Summary    2017 07:01  -  2017 07:00  --------------------------------------------------------  IN: 0 mL / OUT: 600 mL / NET: -600 mL    2017 07:01  -  2017 16:50  --------------------------------------------------------  IN: 0 mL / OUT: 650 mL / NET: -650 mL          Physical Exam:   GENERAL: NAD, well-groomed, well-developed  HEENT: GIOVANI/   Atraumatic, Normocephalic  ENMT: No tonsillar erythema, exudates, or enlargement; Moist mucous membranes, Good dentition, No lesions  NECK: Supple, No JVD, Normal thyroid  CHEST/LUNG: Rhonchi++  CVS: Regular rate and rhythm; No murmurs, rubs, or gallops  GI: : Soft, Nontender, Nondistended; Bowel sounds present  NERVOUS SYSTEM:  Alert & Oriented X3  EXTREMITIES:  2+ Peripheral Pulses, No clubbing, cyanosis, or edema  LYMPH: No lymphadenopathy noted  SKIN: No rashes or lesions  ENDOCRINOLOGY: No Thyromegaly  PSYCH: Appropriate    Labs:                              11.2   13.41 )-----------( 124      ( 2017 11:29 )             36.1                         10.4   10.05 )-----------( 155      ( 2017 06:13 )             32.8                         10.1   8.58  )-----------( 180      ( 2017 06:00 )             32.4                         10.6   9.22  )-----------( 213      ( 10 Nov 2017 05:24 )             34.2     11-12    138  |  96<L>  |  15  ----------------------------<  86  4.3   |  33<H>  |  0.73  11-10    143  |  101  |  19  ----------------------------<  77  4.6   |  30  |  0.81    Ca    8.6      2017 06:13      CAPILLARY BLOOD GLUCOSE            PT/INR - ( 2017 11:29 )   PT: 13.7 SEC;   INR: 1.22          PTT - ( 2017 11:29 )  PTT:28.0 SEC  Urinalysis Basic - ( 2017 12:00 )    Color: PLYEL / Appearance: CLEAR / S.014 / pH: 6.5  Gluc: NEGATIVE / Ketone: NEGATIVE  / Bili: NEGATIVE / Urobili: NORMAL E.U.   Blood: TRACE / Protein: 10 / Nitrite: NEGATIVE   Leuk Esterase: NEGATIVE / RBC: 5-10 / WBC 2-5   Sq Epi: OCC / Non Sq Epi: x / Bacteria: x      Cultures:             < from: Xray Chest 1 View AP -PORTABLE-Routine (17 @ 07:58) >    PROCEDURE DATE:  2017         INTERPRETATION:  Clinical information: Status post left thoracotomy.    Findings: Portable frontal view of the chest dated 2017 is compared   to 2017. Moderate left pneumothorax is unchanged. Trace bilateral   pleural effusions are stable. Bilateral upper lobe scarring is also   stable. Cardiomediastinal silhouette cannot be evaluated.    Impression: Stable moderate left pneumothorax.                  NURA GUERRERO M.D., ATTENDING RADIOLOGIST  This document has been electronically signed. 2017 11:35AM    < end of copied text >                  Studies  Chest X-RAY  CT SCAN Chest   Venous Dopplers: LE:   CT Abdomen  Others

## 2017-11-13 NOTE — PROGRESS NOTE ADULT - PROBLEM SELECTOR PLAN 1
? 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 ot from bronchiectasis? eliquis have been dced: needs card to se: In addition, he may need IR help with embolization, it his hemoptysis increases: He has had pleurodesis on the left side.  11/10 quantify hemoptysis. no AC. stable  11/11 no more hemoptysis. off AC  11/12 resolved. off AC  11/13: today had hemoptysis again----->localize site---> IR embolization: left a message for thoracic team

## 2017-11-13 NOTE — PROGRESS NOTE ADULT - ASSESSMENT
47M w/ PMHx of Sarcoid, HTN, COPD/Asthma, multiple CVAs, PFO, pAF - was on Eliquis as outpt (CHADS2-VASC:3) who was readmitted on 11/8 for hemoptysis. Anticoagulation has been held since admission 47M w/ PMHx of Sarcoid, HTN, COPD/Asthma, multiple CVAs, PFO, pAF - was on Eliquis as outpt (CHADS2-VASC:3) who was readmitted on 11/8 for hemoptysis. Anticoagulation has been held since admission. The pt reports hemoptysis has persisted, possibly with increasing amount, H/H stable.    Plan:    1. pAF w/ h/o CVAs: CHADS2-VASC:3, pt would benefit from systemic a/c, would start Heparin gtt when stable from Pulm perspective and bridge to Coumadin (aim for INR 2-2.5), c/w Lopressor for rate control (currently in sinus), would not uptitrate B-blocker to treat sinus tachy    2. HTN: c/w B-blocker, BP acceptable    3. HLD: c/w high intensity statin    4. PFO: given that pt has pAF will need systemic a/c regardless, would not pursue closure

## 2017-11-13 NOTE — PROGRESS NOTE ADULT - SUBJECTIVE AND OBJECTIVE BOX
Patient seen and examined at bedside on [address] [place] [address]    Overnight Events: No events overnight    Review Of Systems: No chest pain, shortness of breath, or palpitations            Current Medications:  acetaminophen   Tablet 650 milliGRAM(s) Oral every 6 hours PRN  atorvastatin 80 milliGRAM(s) Oral at bedtime  benzonatate 100 milliGRAM(s) Oral three times a day  buDESOnide 160 MICROgram(s)/formoterol 4.5 MICROgram(s) Inhaler 2 Puff(s) Inhalation two times a day  diVALproex  milliGRAM(s) Oral two times a day  docusate sodium 100 milliGRAM(s) Oral two times a day  ferrous    sulfate 325 milliGRAM(s) Oral three times a day with meals  folic acid 1 milliGRAM(s) Oral daily  ipratropium    for Nebulization 500 MICROGram(s) Nebulizer every 6 hours  levalbuterol Inhalation 0.63 milliGRAM(s) Inhalation every 6 hours  lidocaine   Patch 1 Patch Transdermal daily  metoprolol     tartrate 12.5 milliGRAM(s) Oral every 12 hours  ondansetron Injectable 4 milliGRAM(s) IV Push once  oxyCODONE    IR 10 milliGRAM(s) Oral every 4 hours PRN  oxyCODONE    IR 5 milliGRAM(s) Oral every 4 hours PRN  pantoprazole    Tablet 40 milliGRAM(s) Oral before breakfast  polyethylene glycol 3350 17 Gram(s) Oral at bedtime  predniSONE   Tablet 10 milliGRAM(s) Oral daily    PAST MEDICAL & SURGICAL HISTORY:  Pneumothorax  COPD  Asthma  HTN  Sarcoidosis  History of thoracotomy: 9/24/17 Left thoracotomy, Left upper lobectomy   9/25/17 Reop left thoracotomy, evacuation of left hemothorax  pAF  PFO    Vitals:  T(F): 98.7 (11-13), Max: 101.4 (11-13)  HR: 124 (11-13) (100 - 142)  BP: 112/65 (11-13) (90/45 - 119/86)  RR: 22 (11-13)  SpO2: 96% (11-13)  I&O's Summary    12 Nov 2017 07:01  -  13 Nov 2017 07:00  --------------------------------------------------------  IN: 0 mL / OUT: 600 mL / NET: -600 mL    Physical Exam:  Appearance: No acute distress; well appearing  Eyes: PERRL, EOMI, pink conjunctiva  HENT: Normal oral muscosa  Cardiovascular: RRR, S1, S2, no murmurs, rubs, or gallops; no edema; no JVD  Respiratory: Clear to auscultation bilaterally  Gastrointestinal: soft, non-tender, non-distended with normal bowel sounds  Musculoskeletal: No clubbing; no joint deformity   Neurologic: Non-focal  Lymphatic: No lymphadenopathy  Psychiatry: AAOx3, mood & affect appropriate  Skin: No rashes, ecchymoses, or cyanosis                          10.4 (Hb has been stable)   10.05 )-----------( 155      ( 12 Nov 2017 06:13 )             32.8     11-12    138  |  96<L>  |  15  ----------------------------<  86  4.3   |  33<H>  |  0.73    Ca    8.6      12 Nov 2017 06:13    New ECG(s): No new ECGs in the chart    Interpretation of Telemetry: Sinus tachy 110s - 160s Patient seen and examined at bedside on 8 [place] 823A with girlfriend at bedside    Overnight Events: No events overnight    Review Of Systems: No chest pain, shortness of breath, or palpitations. The pt reports he slept poorly last night and has been coughing a lot. He reports hemoptysis has persisted (he thinks is may have increased). Was able to walk around the floor yesterday.         Current Medications:  acetaminophen   Tablet 650 milliGRAM(s) Oral every 6 hours PRN  atorvastatin 80 milliGRAM(s) Oral at bedtime  benzonatate 100 milliGRAM(s) Oral three times a day  buDESOnide 160 MICROgram(s)/formoterol 4.5 MICROgram(s) Inhaler 2 Puff(s) Inhalation two times a day  diVALproex  milliGRAM(s) Oral two times a day  docusate sodium 100 milliGRAM(s) Oral two times a day  ferrous    sulfate 325 milliGRAM(s) Oral three times a day with meals  folic acid 1 milliGRAM(s) Oral daily  ipratropium    for Nebulization 500 MICROGram(s) Nebulizer every 6 hours  levalbuterol Inhalation 0.63 milliGRAM(s) Inhalation every 6 hours  lidocaine   Patch 1 Patch Transdermal daily  metoprolol     tartrate 12.5 milliGRAM(s) Oral every 12 hours  ondansetron Injectable 4 milliGRAM(s) IV Push once  oxyCODONE    IR 10 milliGRAM(s) Oral every 4 hours PRN  oxyCODONE    IR 5 milliGRAM(s) Oral every 4 hours PRN  pantoprazole    Tablet 40 milliGRAM(s) Oral before breakfast  polyethylene glycol 3350 17 Gram(s) Oral at bedtime  predniSONE   Tablet 10 milliGRAM(s) Oral daily    PAST MEDICAL & SURGICAL HISTORY:  Pneumothorax  COPD  Asthma  HLD  HTN  Sarcoidosis  History of thoracotomy: 9/24/17 Left thoracotomy, Left upper lobectomy   9/25/17 Reop left thoracotomy, evacuation of left hemothorax  pAF  PFO    Vitals:  T(F): 98.7 (11-13), Max: 101.4 (11-13)  HR: 124 (11-13) (100 - 142)  BP: 112/65 (11-13) (90/45 - 119/86)  RR: 22 (11-13)  SpO2: 96% on 2L of humidified O2  I&O's Summary    12 Nov 2017 07:01  -  13 Nov 2017 07:00  --------------------------------------------------------  IN: 0 mL / OUT: 600 mL / NET: -600 mL    Physical Exam:  Appearance: Pt sitting in chair, appears fatigued, breathing comfortably on 2L NC, no accessory muscle use, speaking in full sentences  Eyes: PERRL, EOMI, pink conjunctiva  HENT: Normal oral muscosa  Cardiovascular: tachy, regular, S1, S2, no murmurs, rubs, or gallops; no edema; no JVD  Respiratory: Diffuse insp wheeze, decreased breath sounds R base  Gastrointestinal: soft, non-tender, non-distended with normal bowel sounds  Musculoskeletal: No clubbing; no joint deformity   Neurologic: Non-focal  Lymphatic: No lymphadenopathy  Psychiatry: AAOx3, mood & affect appropriate  Skin: No rashes, ecchymoses, or cyanosis                          10.4 (Hb has been stable)   10.05 )-----------( 155      ( 12 Nov 2017 06:13 )             32.8     11-12    138  |  96<L>  |  15  ----------------------------<  86  4.3   |  33<H>  |  0.73    Ca    8.6      12 Nov 2017 06:13    New ECG(s): No new ECGs in the chart    Interpretation of Telemetry: Sinus tachy 110s - 160s

## 2017-11-14 LAB
GRAM STN SPT: SIGNIFICANT CHANGE UP
HCT VFR BLD CALC: 34.9 % — LOW (ref 39–50)
HGB BLD-MCNC: 10.7 G/DL — LOW (ref 13–17)
MCHC RBC-ENTMCNC: 30.5 PG — SIGNIFICANT CHANGE UP (ref 27–34)
MCHC RBC-ENTMCNC: 30.7 % — LOW (ref 32–36)
MCV RBC AUTO: 99.4 FL — SIGNIFICANT CHANGE UP (ref 80–100)
NRBC # FLD: 0 — SIGNIFICANT CHANGE UP
PLATELET # BLD AUTO: 110 K/UL — LOW (ref 150–400)
PMV BLD: 14.4 FL — HIGH (ref 7–13)
RBC # BLD: 3.51 M/UL — LOW (ref 4.2–5.8)
RBC # FLD: 15.1 % — HIGH (ref 10.3–14.5)
SPECIMEN SOURCE: SIGNIFICANT CHANGE UP
WBC # BLD: 12.15 K/UL — HIGH (ref 3.8–10.5)
WBC # FLD AUTO: 12.15 K/UL — HIGH (ref 3.8–10.5)

## 2017-11-14 PROCEDURE — 71010: CPT | Mod: 26

## 2017-11-14 PROCEDURE — 99232 SBSQ HOSP IP/OBS MODERATE 35: CPT

## 2017-11-14 RX ORDER — ALPRAZOLAM 0.25 MG
0.5 TABLET ORAL AT BEDTIME
Qty: 0 | Refills: 0 | Status: DISCONTINUED | OUTPATIENT
Start: 2017-11-14 | End: 2017-11-15

## 2017-11-14 RX ADMIN — Medication 650 MILLIGRAM(S): at 12:16

## 2017-11-14 RX ADMIN — Medication 100 MILLIGRAM(S): at 06:09

## 2017-11-14 RX ADMIN — OXYCODONE HYDROCHLORIDE 5 MILLIGRAM(S): 5 TABLET ORAL at 01:35

## 2017-11-14 RX ADMIN — PANTOPRAZOLE SODIUM 40 MILLIGRAM(S): 20 TABLET, DELAYED RELEASE ORAL at 06:09

## 2017-11-14 RX ADMIN — DIVALPROEX SODIUM 500 MILLIGRAM(S): 500 TABLET, DELAYED RELEASE ORAL at 17:09

## 2017-11-14 RX ADMIN — Medication 500 MICROGRAM(S): at 04:00

## 2017-11-14 RX ADMIN — Medication 500 MICROGRAM(S): at 09:48

## 2017-11-14 RX ADMIN — LEVALBUTEROL 0.63 MILLIGRAM(S): 1.25 SOLUTION, CONCENTRATE RESPIRATORY (INHALATION) at 22:27

## 2017-11-14 RX ADMIN — OXYCODONE HYDROCHLORIDE 5 MILLIGRAM(S): 5 TABLET ORAL at 18:23

## 2017-11-14 RX ADMIN — Medication 0.5 MILLIGRAM(S): at 21:12

## 2017-11-14 RX ADMIN — BUDESONIDE AND FORMOTEROL FUMARATE DIHYDRATE 2 PUFF(S): 160; 4.5 AEROSOL RESPIRATORY (INHALATION) at 21:10

## 2017-11-14 RX ADMIN — LEVALBUTEROL 0.63 MILLIGRAM(S): 1.25 SOLUTION, CONCENTRATE RESPIRATORY (INHALATION) at 04:00

## 2017-11-14 RX ADMIN — Medication 650 MILLIGRAM(S): at 06:54

## 2017-11-14 RX ADMIN — Medication 1 MILLIGRAM(S): at 12:16

## 2017-11-14 RX ADMIN — LEVALBUTEROL 0.63 MILLIGRAM(S): 1.25 SOLUTION, CONCENTRATE RESPIRATORY (INHALATION) at 09:48

## 2017-11-14 RX ADMIN — Medication 100 MILLIGRAM(S): at 18:24

## 2017-11-14 RX ADMIN — OXYCODONE HYDROCHLORIDE 5 MILLIGRAM(S): 5 TABLET ORAL at 02:09

## 2017-11-14 RX ADMIN — Medication 10 MILLIGRAM(S): at 06:09

## 2017-11-14 RX ADMIN — Medication 12.5 MILLIGRAM(S): at 06:09

## 2017-11-14 RX ADMIN — Medication 12.5 MILLIGRAM(S): at 17:09

## 2017-11-14 RX ADMIN — Medication 325 MILLIGRAM(S): at 10:13

## 2017-11-14 RX ADMIN — LEVALBUTEROL 0.63 MILLIGRAM(S): 1.25 SOLUTION, CONCENTRATE RESPIRATORY (INHALATION) at 15:38

## 2017-11-14 RX ADMIN — Medication 500 MICROGRAM(S): at 15:38

## 2017-11-14 RX ADMIN — Medication 100 MILLIGRAM(S): at 17:09

## 2017-11-14 RX ADMIN — Medication 500 MICROGRAM(S): at 22:27

## 2017-11-14 RX ADMIN — BUDESONIDE AND FORMOTEROL FUMARATE DIHYDRATE 2 PUFF(S): 160; 4.5 AEROSOL RESPIRATORY (INHALATION) at 09:51

## 2017-11-14 RX ADMIN — DIVALPROEX SODIUM 500 MILLIGRAM(S): 500 TABLET, DELAYED RELEASE ORAL at 06:09

## 2017-11-14 RX ADMIN — ATORVASTATIN CALCIUM 80 MILLIGRAM(S): 80 TABLET, FILM COATED ORAL at 21:10

## 2017-11-14 RX ADMIN — Medication 325 MILLIGRAM(S): at 17:09

## 2017-11-14 NOTE — PROGRESS NOTE ADULT - SUBJECTIVE AND OBJECTIVE BOX
Subjective: "I feel better today. less tired. I'm coughing up less blood" Pt amb in ng and room. Appears clinically improved today. Less SOB. No distress. Some hemoptysis early this am but less than yesterday. No fever or chills.     Vital Signs:  Vital Signs Last 24 Hrs  T(C): 36.7 (11-14-17 @ 11:49), Max: 38.3 (11-14-17 @ 06:55)  T(F): 98 (11-14-17 @ 11:49), Max: 100.9 (11-14-17 @ 06:55)  HR: 114 (11-14-17 @ 11:49) (77 - 122)  BP: 116/66 (11-14-17 @ 11:49) (114/81 - 136/89)  RR: 18 (11-14-17 @ 11:49) (18 - 20)  SpO2: 92% (11-14-17 @ 11:49) (92% - 100%) on (O2)    Telemetry/Alarms:  General: WN/WD NAD  Neurology: Awake, nonfocal, BETH x 4, strength Left UE 3-4/5. No new signs of infarct. Refusing MRI still at this time.   Eyes: Scleras clear, PERRLA/ EOMI, Gross vision intact  ENT:Gross hearing intact, grossly patent pharynx, no stridor  Neck: Neck supple, trachea midline, No JVD,   Respiratory: decreased throughout left, +hemoptysis., No wheezing, rales, rhonchi  CV: RRR, S1S2, no murmurs, rubs or gallops  Abdominal: Soft, NT, ND +BS, +Bm  Extremities: 2+ left LE edema, 1-2+ edema rt. pedal No calf tenderness, + peripheral pulses  Skin: No Rashes, Hematoma, Ecchymosis  Lymphatic: No Neck, axilla, groin LAD  Psych: Oriented x 3, normal affect  Incisions: thoracotomy healing    Relevant labs, radiology and Medications reviewed           CXR still sml/mod left ptx. no significant change.              10.7 12.15 )-----------( 110      ( 14 Nov 2017 06:00 )             34.9           PT/INR - ( 13 Nov 2017 11:29 )   PT: 13.7 SEC;   INR: 1.22          PTT - ( 13 Nov 2017 11:29 )  PTT:28.0 SEC  MEDICATIONS  (STANDING):  atorvastatin 80 milliGRAM(s) Oral at bedtime  benzonatate 100 milliGRAM(s) Oral three times a day  buDESOnide 160 MICROgram(s)/formoterol 4.5 MICROgram(s) Inhaler 2 Puff(s) Inhalation two times a day  diVALproex  milliGRAM(s) Oral two times a day  docusate sodium 100 milliGRAM(s) Oral two times a day  ferrous    sulfate 325 milliGRAM(s) Oral three times a day with meals  folic acid 1 milliGRAM(s) Oral daily  ipratropium    for Nebulization 500 MICROGram(s) Nebulizer every 6 hours  levalbuterol Inhalation 0.63 milliGRAM(s) Inhalation every 6 hours  lidocaine   Patch 1 Patch Transdermal daily  metoprolol     tartrate 12.5 milliGRAM(s) Oral every 12 hours  ondansetron Injectable 4 milliGRAM(s) IV Push once  pantoprazole    Tablet 40 milliGRAM(s) Oral before breakfast  polyethylene glycol 3350 17 Gram(s) Oral at bedtime  predniSONE   Tablet 10 milliGRAM(s) Oral daily    MEDICATIONS  (PRN):  acetaminophen   Tablet 650 milliGRAM(s) Oral every 6 hours PRN Mild Pain (1 - 3)  HYDROcodone/homatropine Syrup 5 milliLiter(s) Oral every 6 hours PRN Cough  oxyCODONE    IR 10 milliGRAM(s) Oral every 4 hours PRN Severe Pain (7 - 10)  oxyCODONE    IR 5 milliGRAM(s) Oral every 4 hours PRN Moderate Pain (4 - 6)    Pertinent Physical Exam  I&O's Summary    13 Nov 2017 07:01  -  14 Nov 2017 07:00  --------------------------------------------------------  IN: 0 mL / OUT: 1450 mL / NET: -1450 mL        Assessment  47y Male  w/ PAST MEDICAL & SURGICAL HISTORY:  History of pneumothorax: History of 3 prior pneumonthoracies.  COPD (chronic obstructive pulmonary disease)  Asthma  Hypertension  Sarcoidosis  History of thoracotomy: 9/24/17 Left thoracotomy, Left upper lobectomy   9/25/17 Reop left thoracotomy, evacuation of left hemothorax  admitted with complaints of Patient is a 47y old  Male who presents with a chief complaint of hemoptysis (09 Nov 2017 00:32)  47 year old male with a PMHx of sarcoidosis, HTN, asthma, and COPD presents to the ED with severe leg spasms and headache leading to a presyncopal episode, found to have a significant abnormal EKG and moderate inspiratory and expiratory wheezing in the setting of Entero/Rhino virus, admitted to tele for Near Syncope, r/o ACS and COPD exacerbation.  Hospital course complicated by stroke and PAF- AC on hold due to hemoptysis; 9/21-FB/BAL done by Dr. Arias. Intraop: Bleeding from DAVID (non-lingular)-Recom IR to embolize; 9/22 CT angio done per IR [place] request.  Formal PFTS and ask Card to fix PFO now to decrease blood flow; 9/24- Developed worsening hemoptysis. Emergently kristel to OR and had  Left thoractomy DAVID. Brought to CTICU.  9/24: 1400ml EBL in OR; 10 u PRBCs;9/25-Brought back to OR for bleeding. Had Reop Thoracotomy, evac left hemothorax. CTI- DAVID space despite -40 Sxn, MEYER, L Hemiparesis (CVA) better w walking; Patient post op with prolongd air leak shila ptx, bedside pleurodesis done.  Developed SIRS after pleurodesis-> CTICU; On pressors. Pt. clinically improved in CTICU. Maintained on Steroids, weaned down to Solumedrol BID. Off pressors and transitioned to Midodrine. Still with thrombocytopenia but no bleeding. Continued on Eliquis but no ASA for h/o CVA. Neuro deficits improving.  Air leak resolved on 10/27. 10/30 Chest tube removed, cxr stable.  Steriods to po taper.  Patient stable for discharge.  11/2-Pt ambulating w PT frequently. Feels well. Pain controlled. All labs and vital signs stable. Wound healing. Arnold po steroid taper. To complete Voriconazole on Nov 4th. CXRs and reports reviewed by and cleared by Dr. Caldera. Pt. to fu for PFO closure as output. Still requires intermittent oxygen. Cleared for discharge by Dr. Caldera to acute rehab.    Patient presented to [place] ER after rehab send patient with hemoptysis, patient had 2 episodes of hemoptysis, less than 10cc in total for 24 hours.  Patient states it happens when he coughs, blood tinged sputum.  Patient otherwise doing well in rehab. Since admission pt w intermittent hemopytsis. All A/c put on hold. Seen by Neuro and Pulmonary and Cardiology. No plans for PFO closure. MRI ordered by Neuro. CTA done to localize source of bleeding, 11/13- Pt with fever, s/p cultures, seen by ID. Increased hemoptysis.     PLAN  Neuro: Pain management. No plans for MRI at this time. FU any Neuro reccs.   Pulm: Encourage coughing, deep breathing and use of incentive spirometry. Wean off supplemental oxygen as able. Daily CXR. MOnitor hemoptysis.   Cardio: Monitor telemetry/alarms. No afib. cont to monitor. NO plans for PFO closure per Card. Will cont to hold A/c for now due to blding. Will likely resume Eliquis after Bronch tomm.   GI: Tolerating diet. Continue stool softeners.  Renal: monitor urine output, supplement electrolytes as needed  Vasc: Heparin SC/SCDs for DVT prophylaxis  Heme: Stable H/H. .MOnitor for acute bld loss anemia. Transfuse as needed.    ID: Off antibiotics. No fevers, FU cultures. Leukocytosis slightly improved. Will get deep BAL tomm for Bronch.   Therapy: OOB/ambulate. Plan for home w home services per pt request. CM aware.   Disposition: Plan Bronch tomm with Dr. Caldera to assess for active bleed. No plans for IR embolization per Dr. Caldera at this time.   Discussed with Cardiothoracic Team at AM rounds.

## 2017-11-14 NOTE — PROGRESS NOTE ADULT - SUBJECTIVE AND OBJECTIVE BOX
Patient is a 47y old  Male who presents with a chief complaint of hemoptysis (2017 00:32)  Doping ok  no SOB   no cough  no phlegm!!  Some hemoptysis     Any change in ROS:     MEDICATIONS  (STANDING):  atorvastatin 80 milliGRAM(s) Oral at bedtime  benzonatate 100 milliGRAM(s) Oral three times a day  buDESOnide 160 MICROgram(s)/formoterol 4.5 MICROgram(s) Inhaler 2 Puff(s) Inhalation two times a day  diVALproex  milliGRAM(s) Oral two times a day  docusate sodium 100 milliGRAM(s) Oral two times a day  ferrous    sulfate 325 milliGRAM(s) Oral three times a day with meals  folic acid 1 milliGRAM(s) Oral daily  ipratropium    for Nebulization 500 MICROGram(s) Nebulizer every 6 hours  levalbuterol Inhalation 0.63 milliGRAM(s) Inhalation every 6 hours  lidocaine   Patch 1 Patch Transdermal daily  metoprolol     tartrate 12.5 milliGRAM(s) Oral every 12 hours  ondansetron Injectable 4 milliGRAM(s) IV Push once  pantoprazole    Tablet 40 milliGRAM(s) Oral before breakfast  polyethylene glycol 3350 17 Gram(s) Oral at bedtime  predniSONE   Tablet 10 milliGRAM(s) Oral daily    MEDICATIONS  (PRN):  acetaminophen   Tablet 650 milliGRAM(s) Oral every 6 hours PRN Mild Pain (1 - 3)  HYDROcodone/homatropine Syrup 5 milliLiter(s) Oral every 6 hours PRN Cough  oxyCODONE    IR 10 milliGRAM(s) Oral every 4 hours PRN Severe Pain (7 - 10)  oxyCODONE    IR 5 milliGRAM(s) Oral every 4 hours PRN Moderate Pain (4 - 6)    Vital Signs Last 24 Hrs  T(C): 36.8 (2017 08:46), Max: 38.3 (2017 06:55)  T(F): 98.3 (2017 08:46), Max: 100.9 (2017 06:55)  HR: 77 (2017 09:49) (77 - 122)  BP: 119/73 (2017 08:46) (114/81 - 136/89)  BP(mean): --  RR: 20 (2017 08:46) (18 - 20)  SpO2: 99% (2017 09:49) (98% - 100%)    I&O's Summary    2017 07:01  -  2017 07:00  --------------------------------------------------------  IN: 0 mL / OUT: 1450 mL / NET: -1450 mL          Physical Exam:   GENERAL: NAD, well-groomed, well-developed  HEENT: GIOVANI/   Atraumatic, Normocephalic  ENMT: No tonsillar erythema, exudates, or enlargement; Moist mucous membranes, Good dentition, No lesions  NECK: Supple, No JVD, Normal thyroid  CHEST/LUNG: Minimal Wheezing  CVS: Regular rate and rhythm; No murmurs, rubs, or gallops  GI: : Soft, Nontender, Nondistended; Bowel sounds present  NERVOUS SYSTEM:  Alert & Oriented X3  EXTREMITIES:  2+ Peripheral Pulses, No clubbing, cyanosis, or edema  LYMPH: No lymphadenopathy noted  SKIN: No rashes or lesions  ENDOCRINOLOGY: No Thyromegaly  PSYCH: Appropriate    Labs:                              10.7   12.15 )-----------( 110      ( 2017 06:00 )             34.9                         11.2   13.41 )-----------( 124      ( 2017 11:29 )             36.1                         10.4   10.05 )-----------( 155      ( 2017 06:13 )             32.8                         10.1   8.58  )-----------( 180      ( 2017 06:00 )             32.4     11-12    138  |  96<L>  |  15  ----------------------------<  86  4.3   |  33<H>  |  0.73        CAPILLARY BLOOD GLUCOSE            PT/INR - ( 2017 11:29 )   PT: 13.7 SEC;   INR: 1.22          PTT - ( 2017 11:29 )  PTT:28.0 SEC  Urinalysis Basic - ( 2017 12:00 )    Color: PLYEL / Appearance: CLEAR / S.014 / pH: 6.5  Gluc: NEGATIVE / Ketone: NEGATIVE  / Bili: NEGATIVE / Urobili: NORMAL E.U.   Blood: TRACE / Protein: 10 / Nitrite: NEGATIVE   Leuk Esterase: NEGATIVE / RBC: 5-10 / WBC 2-5   Sq Epi: OCC / Non Sq Epi: x / Bacteria: x      Cultures:           Wound culture:                 @ 00:44  Organism --  Culture w/ gram stain --  Specimen Source SPUTUM    Wound culture:                 @ 13:26  Organism --  Culture w/ gram stain --  Specimen Source URINE MIDSTREAM      Abscess culture:              @ 00:44  Organism --  Gram Stain --  Specimen Source SPUTUM    Abscess culture:              @ 13:26  Organism --  Gram Stain --  Specimen Source URINE MIDSTREAM        Tissue culture:            @ 00:44  Organism --  Gram Stain --  Specimen Source SPUTUM    Tissue culture:            @ 13:26  Organism --  Gram Stain --  Specimen Source URINE MIDSTREAM      Body Fluid Smear & Culture:                         @ 00:44  AFB Smear  --  Culture Acid Fast Body Fluid w/ Smear  --  Culture Acid Fast Smear Concentrated   --    Culture Results:     --  Specimen Source SPUTUM    Body Fluid Smear & Culture:                         @ 13:26  AFB Smear  --  Culture Acid Fast Body Fluid w/ Smear  --  Culture Acid Fast Smear Concentrated   --    Culture Results:     --  Specimen Source URINE MIDSTREAM        Rapid Respiratory Viral Panel Result         @ 15:46  Rapid RVP --  Coronovirus --  Adenovirus NOT DETECTED  Bordetella Pertussis NOT DETECTED  Chlamydia Pneumonia NOT DETECTED  Entero/RhinovirusNOT DETECTED  HKU1 Coronovirus --  HMPV Coronovirus NOT DETECTED  Influenza A NOT DETECTED (any subtype)  Influenza AH1 NOT DETECTED  Influenza AH1 2009 NOT DETECTED  Influenza AH3 NOT DETECTED  Influenza B NOT DETECTED  Mycoplasma Pneumoniae NOT DETECTED This nucleic acid amplification assay was performed using  the [employer]. The following pathogens are tested  for: Adenovirus, Coronavirus 229E, Coronavirus HKU1,  Coronavirus NL63, Coronavirus OC43, Human Metapneumovirus  (HMPV), Rhinovirus/Enterovirus, Influenza A H1, Influenza A  H1 2009 (Pandemic H1 2009), Influenza A H3, Influenza A (Flu  A) subtype not identified, Influenza B, Parainfluenza Virus  (types 1, 2, 3, 4), Respiratory Syncytial Virus (RSV),  Bordetella pertussis, Chlamydophila pneumoniae, and  Mycoplasma pneumoniae. A negative FilmArray result does not  always exclude the possibility of viral or bacterial  infection. Laboratory results should always be interpreted  in the context of clinical findings.  NL63 Coronovirus --  OC43 Coronovirus --  Parainfluenza 1 NOT DETECTED  Parainfluenza 2 NOT DETECTED  Parainfluenza 3 NOT DETECTED  Parainfluenza 4 NOT DETECTED  Resp Syncytial Virus NOT DETECTED      < from: Xray Chest 1 View AP -PORTABLE-Routine (17 @ 07:58) >  EXAM:  RAD CHEST PORTABLE ROUTINE        PROCEDURE DATE:  2017         INTERPRETATION:  Clinical information: Status post left thoracotomy.    Findings: Portable frontal view of the chest dated 2017 is compared   to 2017. Moderate left pneumothorax is unchanged. Trace bilateral   pleural effusions are stable. Bilateral upper lobe scarring is also   stable. Cardiomediastinal silhouette cannot be evaluated.    Impression: Stable moderate left pneumothorax.                  NURA GUERRERO M.D., ATTENDING RADIOLOGIST  This document has been electronically signed. 2017 11:35AM    < end of copied text >      Studies  Chest X-RAY  CT SCAN Chest   Venous Dopplers: LE:   CT Abdomen  Others

## 2017-11-14 NOTE — PROGRESS NOTE ADULT - PROBLEM SELECTOR PLAN 2
Cont BD with inhaled steroids and oral steroids  11/114: Pts wheezing has significantly improved: he will need long term steroids as he starts wheezing once his steroids are dced!

## 2017-11-14 NOTE — PROGRESS NOTE ADULT - ATTENDING COMMENTS
No hemoptysis in last 24 hours: However toughest decision would be to whether to reinitiate AC or not?    11/13: Difficult situation: having hemoptysis but needs AC, which can not be started!! HAS pfo TOO AS WELL AS paf : : :   HAD LEFT UPPER LOBECTOMY FOR HEMOPTYSIS with extensive sarcoid fibrosis of lungs: with postoperative course pretty prolonged complicated by persistent ptx!: Had blood patch as well as talc pleurodesis!! Now with ptx and hemoptysis again:  11/14: Needs repeat bronch and IR embolization given continued hemoptysis

## 2017-11-14 NOTE — PROGRESS NOTE ADULT - PROBLEM SELECTOR PLAN 3
on prednisone.  11/10 continue steroid  11/11 on steroid  11/12 on oral steroid  11/13: cont oral steroids!!  11/14: Has pretty poor lungs secondary to steroids: Cont current therapy!!

## 2017-11-14 NOTE — PROGRESS NOTE ADULT - PROBLEM SELECTOR PLAN 5
s/p pleurodesis.  11/10 s/p pleurodesis. Management defer to CTS  11/11 no intervention for now. per CTS.  11/13: has mod pneumothorax stable: defer to thoracic for any intervention!  11/12 no intervention planned. bronchoscopy in future per CTS  11/14: Pt still has left sided pneumothorax: s/p blood patch as well as the talc pleurodesis on the left side: Hard to do any other intervention : would defer to thoracic surgical team!!

## 2017-11-14 NOTE — PROGRESS NOTE ADULT - SUBJECTIVE AND OBJECTIVE BOX
Follow Up:      Inverval History/ROS:Patient is a 47y old  Male who presents with a chief complaint of hemoptysis (2017 00:32)    Still having hemoptysis.  But does feel a little better.    Allergies    No Known Allergies    Intolerances        ANTIMICROBIALS:      OTHER MEDS:  acetaminophen   Tablet 650 milliGRAM(s) Oral every 6 hours PRN  atorvastatin 80 milliGRAM(s) Oral at bedtime  benzonatate 100 milliGRAM(s) Oral three times a day  buDESOnide 160 MICROgram(s)/formoterol 4.5 MICROgram(s) Inhaler 2 Puff(s) Inhalation two times a day  diVALproex  milliGRAM(s) Oral two times a day  docusate sodium 100 milliGRAM(s) Oral two times a day  ferrous    sulfate 325 milliGRAM(s) Oral three times a day with meals  folic acid 1 milliGRAM(s) Oral daily  HYDROcodone/homatropine Syrup 5 milliLiter(s) Oral every 6 hours PRN  ipratropium    for Nebulization 500 MICROGram(s) Nebulizer every 6 hours  levalbuterol Inhalation 0.63 milliGRAM(s) Inhalation every 6 hours  lidocaine   Patch 1 Patch Transdermal daily  metoprolol     tartrate 12.5 milliGRAM(s) Oral every 12 hours  ondansetron Injectable 4 milliGRAM(s) IV Push once  oxyCODONE    IR 10 milliGRAM(s) Oral every 4 hours PRN  oxyCODONE    IR 5 milliGRAM(s) Oral every 4 hours PRN  pantoprazole    Tablet 40 milliGRAM(s) Oral before breakfast  polyethylene glycol 3350 17 Gram(s) Oral at bedtime  predniSONE   Tablet 10 milliGRAM(s) Oral daily      Vital Signs Last 24 Hrs  T(C): 36.7 (2017 11:49), Max: 38.3 (2017 06:55)  T(F): 98 (2017 11:49), Max: 100.9 (2017 06:55)  HR: 114 (2017 11:49) (77 - 122)  BP: 116/66 (2017 11:49) (114/81 - 136/89)  BP(mean): --  RR: 18 (2017 11:49) (18 - 20)  SpO2: 92% (2017 11:49) (92% - 100%)    PHYSICAL EXAM:  General: [ x] non-toxic  HEAD/EYES: [ ] PERRL [x ] white sclera [ ] icterus  ENT:  [ ] normal [ x] supple [ ] thrush [ ] pharyngeal exudate  Cardiovascular:   [ ] murmur [ x] normal [ ] PPM/AICD  Respiratory:  [x ] clear to ausculation bilaterally  GI:  [x ] soft, non-tender, normal bowel sounds  :  [ ] mock [ ] no CVA tenderness   Musculoskeletal:  [ ] no synovitis  Neurologic:  [ ] non-focal exam   Skin:  [ x] no rash  Lymph: [ ]x no lymphadenopathy  Psychiatric:  [x ] appropriate affect [ ] alert & oriented  Lines:  [ x] no phlebitis [ ] central line                                10.7   12.15 )-----------( 110      ( 2017 06:00 )             34.9               Urinalysis Basic - ( 2017 12:00 )    Color: PLYEL / Appearance: CLEAR / S.014 / pH: 6.5  Gluc: NEGATIVE / Ketone: NEGATIVE  / Bili: NEGATIVE / Urobili: NORMAL E.U.   Blood: TRACE / Protein: 10 / Nitrite: NEGATIVE   Leuk Esterase: NEGATIVE / RBC: 5-10 / WBC 2-5   Sq Epi: OCC / Non Sq Epi: x / Bacteria: x        MICROBIOLOGY:    RADIOLOGY:

## 2017-11-14 NOTE — PROGRESS NOTE ADULT - PROBLEM SELECTOR PLAN 1
? 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 ot from bronchiectasis? eliquis have been dced: needs card to se: In addition, he may need IR help with embolization, it his hemoptysis increases: He has had pleurodesis on the left side.  11/10 quantify hemoptysis. no AC. stable  11/11 no more hemoptysis. off AC  11/12 resolved. off AC  11/13: today had hemoptysis again----->localize site---> IR embolization: left a message for thoracic team  11/14: DW Dr Caldera: for repeat bronchoscopy to localize the site of bleeding and need IR help to embolize the culprit vessel!

## 2017-11-15 ENCOUNTER — APPOINTMENT (OUTPATIENT)
Dept: THORACIC SURGERY | Facility: HOSPITAL | Age: 47
End: 2017-11-15
Payer: MEDICAID

## 2017-11-15 ENCOUNTER — TRANSCRIPTION ENCOUNTER (OUTPATIENT)
Age: 47
End: 2017-11-15

## 2017-11-15 LAB
BACTERIA UR CULT: SIGNIFICANT CHANGE UP
BASE EXCESS BLDA CALC-SCNC: 11.2 MMOL/L — SIGNIFICANT CHANGE UP
BASE EXCESS BLDA CALC-SCNC: 11.8 MMOL/L — SIGNIFICANT CHANGE UP
BASE EXCESS BLDA CALC-SCNC: 12.4 MMOL/L — SIGNIFICANT CHANGE UP
BASE EXCESS BLDA CALC-SCNC: 12.8 MMOL/L — SIGNIFICANT CHANGE UP
BASE EXCESS BLDA CALC-SCNC: 13 MMOL/L — SIGNIFICANT CHANGE UP
BASOPHILS # BLD AUTO: 0.02 K/UL — SIGNIFICANT CHANGE UP (ref 0–0.2)
BASOPHILS NFR BLD AUTO: 0.2 % — SIGNIFICANT CHANGE UP (ref 0–2)
BLD GP AB SCN SERPL QL: NEGATIVE — SIGNIFICANT CHANGE UP
BUN SERPL-MCNC: 16 MG/DL — SIGNIFICANT CHANGE UP (ref 7–23)
CALCIUM SERPL-MCNC: 8.8 MG/DL — SIGNIFICANT CHANGE UP (ref 8.4–10.5)
CHLORIDE BLDA-SCNC: 93 MMOL/L — LOW (ref 96–108)
CHLORIDE BLDA-SCNC: 93 MMOL/L — LOW (ref 96–108)
CHLORIDE BLDA-SCNC: 94 MMOL/L — LOW (ref 96–108)
CHLORIDE BLDA-SCNC: 95 MMOL/L — LOW (ref 96–108)
CHLORIDE SERPL-SCNC: 91 MMOL/L — LOW (ref 98–107)
CO2 SERPL-SCNC: 35 MMOL/L — HIGH (ref 22–31)
CREAT BLDA-MCNC: 0.66 MG/DL — SIGNIFICANT CHANGE UP (ref 0.5–1.3)
CREAT BLDA-MCNC: 0.7 MG/DL — SIGNIFICANT CHANGE UP (ref 0.5–1.3)
CREAT BLDA-MCNC: 0.76 MG/DL — SIGNIFICANT CHANGE UP (ref 0.5–1.3)
CREAT SERPL-MCNC: 0.67 MG/DL — SIGNIFICANT CHANGE UP (ref 0.5–1.3)
EOSINOPHIL # BLD AUTO: 0 K/UL — SIGNIFICANT CHANGE UP (ref 0–0.5)
EOSINOPHIL NFR BLD AUTO: 0 % — SIGNIFICANT CHANGE UP (ref 0–6)
GLUCOSE BLDA-MCNC: 114 MG/DL — HIGH (ref 70–99)
GLUCOSE BLDA-MCNC: 121 MG/DL — HIGH (ref 70–99)
GLUCOSE BLDA-MCNC: 122 MG/DL — HIGH (ref 70–99)
GLUCOSE BLDA-MCNC: 128 MG/DL — HIGH (ref 70–99)
GLUCOSE BLDA-MCNC: 145 MG/DL — HIGH (ref 70–99)
GLUCOSE SERPL-MCNC: 129 MG/DL — HIGH (ref 70–99)
GRAM STN SPT: SIGNIFICANT CHANGE UP
HCO3 BLDA-SCNC: 33 MMOL/L — HIGH (ref 22–26)
HCO3 BLDA-SCNC: 34 MMOL/L — HIGH (ref 22–26)
HCO3 BLDA-SCNC: 35 MMOL/L — HIGH (ref 22–26)
HCO3 BLDA-SCNC: 35 MMOL/L — HIGH (ref 22–26)
HCO3 BLDA-SCNC: 36 MMOL/L — HIGH (ref 22–26)
HCT VFR BLD CALC: 29.8 % — LOW (ref 39–50)
HCT VFR BLD CALC: 32.2 % — LOW (ref 39–50)
HCT VFR BLDA CALC: 27.7 % — LOW (ref 39–51)
HCT VFR BLDA CALC: 28.8 % — LOW (ref 39–51)
HCT VFR BLDA CALC: 30.6 % — LOW (ref 39–51)
HCT VFR BLDA CALC: 32.7 % — LOW (ref 39–51)
HCT VFR BLDA CALC: 57.5 % — CRITICAL HIGH (ref 39–51)
HGB BLD-MCNC: 10 G/DL — LOW (ref 13–17)
HGB BLD-MCNC: 9.5 G/DL — LOW (ref 13–17)
HGB BLDA-MCNC: 10.6 G/DL — LOW (ref 13–17)
HGB BLDA-MCNC: 18.9 G/DL — HIGH (ref 13–17)
HGB BLDA-MCNC: 8.9 G/DL — LOW (ref 13–17)
HGB BLDA-MCNC: 9.3 G/DL — LOW (ref 13–17)
HGB BLDA-MCNC: 9.9 G/DL — LOW (ref 13–17)
IMM GRANULOCYTES # BLD AUTO: 0.04 # — SIGNIFICANT CHANGE UP
IMM GRANULOCYTES NFR BLD AUTO: 0.4 % — SIGNIFICANT CHANGE UP (ref 0–1.5)
LACTATE BLDA-SCNC: 0.8 MMOL/L — SIGNIFICANT CHANGE UP (ref 0.5–2)
LACTATE BLDA-SCNC: 0.9 MMOL/L — SIGNIFICANT CHANGE UP (ref 0.5–2)
LACTATE BLDA-SCNC: 1.1 MMOL/L — SIGNIFICANT CHANGE UP (ref 0.5–2)
LACTATE BLDA-SCNC: 1.3 MMOL/L — SIGNIFICANT CHANGE UP (ref 0.5–2)
LYMPHOCYTES # BLD AUTO: 1.35 K/UL — SIGNIFICANT CHANGE UP (ref 1–3.3)
LYMPHOCYTES # BLD AUTO: 12.1 % — LOW (ref 13–44)
MCHC RBC-ENTMCNC: 30.4 PG — SIGNIFICANT CHANGE UP (ref 27–34)
MCHC RBC-ENTMCNC: 31.1 % — LOW (ref 32–36)
MCHC RBC-ENTMCNC: 31.1 PG — SIGNIFICANT CHANGE UP (ref 27–34)
MCHC RBC-ENTMCNC: 31.9 % — LOW (ref 32–36)
MCV RBC AUTO: 97.7 FL — SIGNIFICANT CHANGE UP (ref 80–100)
MCV RBC AUTO: 97.9 FL — SIGNIFICANT CHANGE UP (ref 80–100)
MONOCYTES # BLD AUTO: 1.07 K/UL — HIGH (ref 0–0.9)
MONOCYTES NFR BLD AUTO: 9.6 % — SIGNIFICANT CHANGE UP (ref 2–14)
NEUTROPHILS # BLD AUTO: 8.7 K/UL — HIGH (ref 1.8–7.4)
NEUTROPHILS NFR BLD AUTO: 77.7 % — HIGH (ref 43–77)
NRBC # FLD: 0 — SIGNIFICANT CHANGE UP
NRBC # FLD: 0 — SIGNIFICANT CHANGE UP
PCO2 BLDA: 56 MMHG — HIGH (ref 35–48)
PCO2 BLDA: 66 MMHG — HIGH (ref 35–48)
PCO2 BLDA: 67 MMHG — HIGH (ref 35–48)
PCO2 BLDA: 74 MMHG — CRITICAL HIGH (ref 35–48)
PCO2 BLDA: 77 MMHG — CRITICAL HIGH (ref 35–48)
PH BLDA: 7.33 PH — LOW (ref 7.35–7.45)
PH BLDA: 7.34 PH — LOW (ref 7.35–7.45)
PH BLDA: 7.37 PH — SIGNIFICANT CHANGE UP (ref 7.35–7.45)
PH BLDA: 7.38 PH — SIGNIFICANT CHANGE UP (ref 7.35–7.45)
PH BLDA: 7.43 PH — SIGNIFICANT CHANGE UP (ref 7.35–7.45)
PLATELET # BLD AUTO: 84 K/UL — LOW (ref 150–400)
PLATELET # BLD AUTO: 94 K/UL — LOW (ref 150–400)
PMV BLD: 13.6 FL — HIGH (ref 7–13)
PMV BLD: 14 FL — HIGH (ref 7–13)
PO2 BLDA: 114 MMHG — HIGH (ref 83–108)
PO2 BLDA: 148 MMHG — HIGH (ref 83–108)
PO2 BLDA: 190 MMHG — HIGH (ref 83–108)
PO2 BLDA: 336 MMHG — HIGH (ref 83–108)
PO2 BLDA: 71 MMHG — LOW (ref 83–108)
POTASSIUM BLDA-SCNC: 4.2 MMOL/L — SIGNIFICANT CHANGE UP (ref 3.4–4.5)
POTASSIUM BLDA-SCNC: 4.3 MMOL/L — SIGNIFICANT CHANGE UP (ref 3.4–4.5)
POTASSIUM BLDA-SCNC: 4.3 MMOL/L — SIGNIFICANT CHANGE UP (ref 3.4–4.5)
POTASSIUM BLDA-SCNC: 4.6 MMOL/L — HIGH (ref 3.4–4.5)
POTASSIUM BLDA-SCNC: 4.6 MMOL/L — HIGH (ref 3.4–4.5)
POTASSIUM SERPL-MCNC: 5 MMOL/L — SIGNIFICANT CHANGE UP (ref 3.5–5.3)
POTASSIUM SERPL-SCNC: 5 MMOL/L — SIGNIFICANT CHANGE UP (ref 3.5–5.3)
RBC # BLD: 3.05 M/UL — LOW (ref 4.2–5.8)
RBC # BLD: 3.29 M/UL — LOW (ref 4.2–5.8)
RBC # FLD: 14.6 % — HIGH (ref 10.3–14.5)
RBC # FLD: 14.6 % — HIGH (ref 10.3–14.5)
RH IG SCN BLD-IMP: POSITIVE — SIGNIFICANT CHANGE UP
SAO2 % BLDA: 100 % — HIGH (ref 95–99)
SAO2 % BLDA: 94.8 % — LOW (ref 95–99)
SAO2 % BLDA: 98.8 % — SIGNIFICANT CHANGE UP (ref 95–99)
SAO2 % BLDA: 98.9 % — SIGNIFICANT CHANGE UP (ref 95–99)
SAO2 % BLDA: 99.7 % — HIGH (ref 95–99)
SODIUM BLDA-SCNC: 127 MMOL/L — LOW (ref 136–146)
SODIUM BLDA-SCNC: 130 MMOL/L — LOW (ref 136–146)
SODIUM BLDA-SCNC: 131 MMOL/L — LOW (ref 136–146)
SODIUM SERPL-SCNC: 135 MMOL/L — SIGNIFICANT CHANGE UP (ref 135–145)
SPECIMEN SOURCE: SIGNIFICANT CHANGE UP
WBC # BLD: 11.18 K/UL — HIGH (ref 3.8–10.5)
WBC # BLD: 11.41 K/UL — HIGH (ref 3.8–10.5)
WBC # FLD AUTO: 11.18 K/UL — HIGH (ref 3.8–10.5)
WBC # FLD AUTO: 11.41 K/UL — HIGH (ref 3.8–10.5)

## 2017-11-15 PROCEDURE — 99291 CRITICAL CARE FIRST HOUR: CPT

## 2017-11-15 PROCEDURE — 99232 SBSQ HOSP IP/OBS MODERATE 35: CPT

## 2017-11-15 PROCEDURE — 31622 DX BRONCHOSCOPE/WASH: CPT | Mod: 78

## 2017-11-15 PROCEDURE — 31645 BRNCHSC W/THER ASPIR 1ST: CPT | Mod: 78

## 2017-11-15 PROCEDURE — 31624 DX BRONCHOSCOPE/LAVAGE: CPT

## 2017-11-15 PROCEDURE — 71010: CPT | Mod: 26

## 2017-11-15 RX ORDER — ALBUMIN HUMAN 25 %
250 VIAL (ML) INTRAVENOUS ONCE
Qty: 0 | Refills: 0 | Status: COMPLETED | OUTPATIENT
Start: 2017-11-15 | End: 2017-11-15

## 2017-11-15 RX ORDER — SODIUM CHLORIDE 9 MG/ML
1000 INJECTION INTRAMUSCULAR; INTRAVENOUS; SUBCUTANEOUS
Qty: 0 | Refills: 0 | Status: DISCONTINUED | OUTPATIENT
Start: 2017-11-15 | End: 2017-11-16

## 2017-11-15 RX ORDER — PHENYLEPHRINE HYDROCHLORIDE 10 MG/ML
2 INJECTION INTRAVENOUS
Qty: 160 | Refills: 0 | Status: DISCONTINUED | OUTPATIENT
Start: 2017-11-15 | End: 2017-11-16

## 2017-11-15 RX ORDER — ACETAMINOPHEN 500 MG
1000 TABLET ORAL ONCE
Qty: 0 | Refills: 0 | Status: COMPLETED | OUTPATIENT
Start: 2017-11-15 | End: 2017-11-15

## 2017-11-15 RX ORDER — VANCOMYCIN HCL 1 G
1000 VIAL (EA) INTRAVENOUS ONCE
Qty: 0 | Refills: 0 | Status: COMPLETED | OUTPATIENT
Start: 2017-11-15 | End: 2017-11-15

## 2017-11-15 RX ORDER — LEVALBUTEROL 1.25 MG/.5ML
0.63 SOLUTION, CONCENTRATE RESPIRATORY (INHALATION) ONCE
Qty: 0 | Refills: 0 | Status: COMPLETED | OUTPATIENT
Start: 2017-11-15 | End: 2017-11-15

## 2017-11-15 RX ORDER — VORICONAZOLE 10 MG/ML
400 INJECTION, POWDER, LYOPHILIZED, FOR SOLUTION INTRAVENOUS EVERY 12 HOURS
Qty: 0 | Refills: 0 | Status: COMPLETED | OUTPATIENT
Start: 2017-11-15 | End: 2017-11-16

## 2017-11-15 RX ORDER — FENTANYL CITRATE 50 UG/ML
1 INJECTION INTRAVENOUS
Qty: 2500 | Refills: 0 | Status: DISCONTINUED | OUTPATIENT
Start: 2017-11-15 | End: 2017-11-22

## 2017-11-15 RX ORDER — SODIUM CHLORIDE 9 MG/ML
1000 INJECTION, SOLUTION INTRAVENOUS
Qty: 0 | Refills: 0 | Status: DISCONTINUED | OUTPATIENT
Start: 2017-11-15 | End: 2017-11-15

## 2017-11-15 RX ORDER — VECURONIUM BROMIDE 20 MG/1
10 INJECTION, POWDER, FOR SOLUTION INTRAVENOUS ONCE
Qty: 0 | Refills: 0 | Status: COMPLETED | OUTPATIENT
Start: 2017-11-15 | End: 2017-11-15

## 2017-11-15 RX ORDER — VANCOMYCIN HCL 1 G
1000 VIAL (EA) INTRAVENOUS EVERY 12 HOURS
Qty: 0 | Refills: 0 | Status: DISCONTINUED | OUTPATIENT
Start: 2017-11-15 | End: 2017-11-19

## 2017-11-15 RX ORDER — IPRATROPIUM BROMIDE 0.2 MG/ML
1 SOLUTION, NON-ORAL INHALATION EVERY 6 HOURS
Qty: 0 | Refills: 0 | Status: DISCONTINUED | OUTPATIENT
Start: 2017-11-15 | End: 2018-01-10

## 2017-11-15 RX ORDER — VORICONAZOLE 10 MG/ML
300 INJECTION, POWDER, LYOPHILIZED, FOR SOLUTION INTRAVENOUS EVERY 12 HOURS
Qty: 0 | Refills: 0 | Status: DISCONTINUED | OUTPATIENT
Start: 2017-11-16 | End: 2017-12-29

## 2017-11-15 RX ORDER — CISATRACURIUM BESYLATE 2 MG/ML
2 INJECTION INTRAVENOUS
Qty: 200 | Refills: 0 | Status: DISCONTINUED | OUTPATIENT
Start: 2017-11-15 | End: 2017-11-19

## 2017-11-15 RX ORDER — SODIUM CHLORIDE 9 MG/ML
250 INJECTION, SOLUTION INTRAVENOUS ONCE
Qty: 0 | Refills: 0 | Status: COMPLETED | OUTPATIENT
Start: 2017-11-15 | End: 2017-11-15

## 2017-11-15 RX ORDER — PIPERACILLIN AND TAZOBACTAM 4; .5 G/20ML; G/20ML
3.38 INJECTION, POWDER, LYOPHILIZED, FOR SOLUTION INTRAVENOUS EVERY 8 HOURS
Qty: 0 | Refills: 0 | Status: DISCONTINUED | OUTPATIENT
Start: 2017-11-15 | End: 2017-11-19

## 2017-11-15 RX ORDER — PHENYLEPHRINE HYDROCHLORIDE 10 MG/ML
0.3 INJECTION INTRAVENOUS
Qty: 80 | Refills: 0 | Status: DISCONTINUED | OUTPATIENT
Start: 2017-11-15 | End: 2017-11-15

## 2017-11-15 RX ORDER — ROCURONIUM BROMIDE 10 MG/ML
50 VIAL (ML) INTRAVENOUS ONCE
Qty: 0 | Refills: 0 | Status: COMPLETED | OUTPATIENT
Start: 2017-11-15 | End: 2017-11-15

## 2017-11-15 RX ORDER — ALBUTEROL 90 UG/1
2 AEROSOL, METERED ORAL EVERY 6 HOURS
Qty: 0 | Refills: 0 | Status: DISCONTINUED | OUTPATIENT
Start: 2017-11-15 | End: 2017-11-16

## 2017-11-15 RX ORDER — DIVALPROEX SODIUM 500 MG/1
500 TABLET, DELAYED RELEASE ORAL
Qty: 0 | Refills: 0 | Status: DISCONTINUED | OUTPATIENT
Start: 2017-11-15 | End: 2017-11-18

## 2017-11-15 RX ORDER — DEXMEDETOMIDINE HYDROCHLORIDE IN 0.9% SODIUM CHLORIDE 4 UG/ML
0.3 INJECTION INTRAVENOUS
Qty: 200 | Refills: 0 | Status: DISCONTINUED | OUTPATIENT
Start: 2017-11-15 | End: 2017-11-20

## 2017-11-15 RX ADMIN — FENTANYL CITRATE 1 MICROGRAM(S)/KG/HR: 50 INJECTION INTRAVENOUS at 22:41

## 2017-11-15 RX ADMIN — LEVALBUTEROL 0.63 MILLIGRAM(S): 1.25 SOLUTION, CONCENTRATE RESPIRATORY (INHALATION) at 04:21

## 2017-11-15 RX ADMIN — DIVALPROEX SODIUM 500 MILLIGRAM(S): 500 TABLET, DELAYED RELEASE ORAL at 05:40

## 2017-11-15 RX ADMIN — PHENYLEPHRINE HYDROCHLORIDE 28.12 MICROGRAM(S)/KG/MIN: 10 INJECTION INTRAVENOUS at 16:51

## 2017-11-15 RX ADMIN — OXYCODONE HYDROCHLORIDE 5 MILLIGRAM(S): 5 TABLET ORAL at 02:15

## 2017-11-15 RX ADMIN — Medication 40 MILLIGRAM(S): at 22:38

## 2017-11-15 RX ADMIN — Medication 125 MILLILITER(S): at 16:00

## 2017-11-15 RX ADMIN — Medication 500 MICROGRAM(S): at 08:12

## 2017-11-15 RX ADMIN — PHENYLEPHRINE HYDROCHLORIDE 28.12 MICROGRAM(S)/KG/MIN: 10 INJECTION INTRAVENOUS at 20:11

## 2017-11-15 RX ADMIN — SODIUM CHLORIDE 75 MILLILITER(S): 9 INJECTION INTRAMUSCULAR; INTRAVENOUS; SUBCUTANEOUS at 20:11

## 2017-11-15 RX ADMIN — LEVALBUTEROL 0.63 MILLIGRAM(S): 1.25 SOLUTION, CONCENTRATE RESPIRATORY (INHALATION) at 08:12

## 2017-11-15 RX ADMIN — Medication 500 MILLILITER(S): at 16:30

## 2017-11-15 RX ADMIN — Medication 40 MILLIGRAM(S): at 14:11

## 2017-11-15 RX ADMIN — Medication 100 MILLIGRAM(S): at 05:40

## 2017-11-15 RX ADMIN — SODIUM CHLORIDE 1000 MILLILITER(S): 9 INJECTION, SOLUTION INTRAVENOUS at 10:00

## 2017-11-15 RX ADMIN — Medication 250 MILLIGRAM(S): at 22:38

## 2017-11-15 RX ADMIN — Medication 400 MILLIGRAM(S): at 09:19

## 2017-11-15 RX ADMIN — PIPERACILLIN AND TAZOBACTAM 25 GRAM(S): 4; .5 INJECTION, POWDER, LYOPHILIZED, FOR SOLUTION INTRAVENOUS at 09:19

## 2017-11-15 RX ADMIN — OXYCODONE HYDROCHLORIDE 5 MILLIGRAM(S): 5 TABLET ORAL at 01:26

## 2017-11-15 RX ADMIN — Medication 12.5 MILLIGRAM(S): at 05:41

## 2017-11-15 RX ADMIN — BUDESONIDE AND FORMOTEROL FUMARATE DIHYDRATE 2 PUFF(S): 160; 4.5 AEROSOL RESPIRATORY (INHALATION) at 23:26

## 2017-11-15 RX ADMIN — Medication 250 MILLIGRAM(S): at 09:33

## 2017-11-15 RX ADMIN — VORICONAZOLE 125 MILLIGRAM(S): 10 INJECTION, POWDER, LYOPHILIZED, FOR SOLUTION INTRAVENOUS at 22:37

## 2017-11-15 RX ADMIN — Medication 0.5 MILLIGRAM(S): at 14:25

## 2017-11-15 RX ADMIN — PANTOPRAZOLE SODIUM 40 MILLIGRAM(S): 20 TABLET, DELAYED RELEASE ORAL at 05:41

## 2017-11-15 RX ADMIN — Medication 100 MILLIGRAM(S): at 01:19

## 2017-11-15 RX ADMIN — Medication 10 MILLIGRAM(S): at 05:41

## 2017-11-15 RX ADMIN — LEVALBUTEROL 0.63 MILLIGRAM(S): 1.25 SOLUTION, CONCENTRATE RESPIRATORY (INHALATION) at 09:37

## 2017-11-15 RX ADMIN — ALBUTEROL 2 PUFF(S): 90 AEROSOL, METERED ORAL at 23:27

## 2017-11-15 RX ADMIN — DEXMEDETOMIDINE HYDROCHLORIDE IN 0.9% SODIUM CHLORIDE 5.62 MICROGRAM(S)/KG/HR: 4 INJECTION INTRAVENOUS at 16:51

## 2017-11-15 RX ADMIN — Medication 0.5 MILLIGRAM(S): at 14:40

## 2017-11-15 RX ADMIN — PIPERACILLIN AND TAZOBACTAM 25 GRAM(S): 4; .5 INJECTION, POWDER, LYOPHILIZED, FOR SOLUTION INTRAVENOUS at 19:00

## 2017-11-15 RX ADMIN — DEXMEDETOMIDINE HYDROCHLORIDE IN 0.9% SODIUM CHLORIDE 5.62 MICROGRAM(S)/KG/HR: 4 INJECTION INTRAVENOUS at 20:11

## 2017-11-15 RX ADMIN — Medication 500 MICROGRAM(S): at 04:21

## 2017-11-15 RX ADMIN — Medication 400 MILLIGRAM(S): at 22:52

## 2017-11-15 RX ADMIN — BUDESONIDE AND FORMOTEROL FUMARATE DIHYDRATE 2 PUFF(S): 160; 4.5 AEROSOL RESPIRATORY (INHALATION) at 11:45

## 2017-11-15 RX ADMIN — Medication 1 PUFF(S): at 23:27

## 2017-11-15 RX ADMIN — Medication 50 MILLIGRAM(S): at 22:36

## 2017-11-15 RX ADMIN — FENTANYL CITRATE 7.5 MICROGRAM(S)/KG/HR: 50 INJECTION INTRAVENOUS at 22:38

## 2017-11-15 RX ADMIN — VECURONIUM BROMIDE 10 MILLIGRAM(S): 20 INJECTION, POWDER, FOR SOLUTION INTRAVENOUS at 22:29

## 2017-11-15 NOTE — PROGRESS NOTE ADULT - SUBJECTIVE AND OBJECTIVE BOX
Patient is a 47y old  Male who presents with a chief complaint of hemoptysis (2017 00:32)    today's events noted:  got more SOB as well as more wheezing and mild hemoptysis:  found to be septic: febrile , started on antibiotics and steroids increased     Any change in ROS:     MEDICATIONS  (STANDING):  atorvastatin 80 milliGRAM(s) Oral at bedtime  benzonatate 100 milliGRAM(s) Oral three times a day  buDESOnide 160 MICROgram(s)/formoterol 4.5 MICROgram(s) Inhaler 2 Puff(s) Inhalation two times a day  diVALproex  milliGRAM(s) Oral two times a day  docusate sodium 100 milliGRAM(s) Oral two times a day  ferrous    sulfate 325 milliGRAM(s) Oral three times a day with meals  folic acid 1 milliGRAM(s) Oral daily  ipratropium    for Nebulization 500 MICROGram(s) Nebulizer every 6 hours  lactated ringers Bolus 250 milliLiter(s) IV Bolus once  lactated ringers. 1000 milliLiter(s) (75 mL/Hr) IV Continuous <Continuous>  levalbuterol Inhalation 0.63 milliGRAM(s) Inhalation every 6 hours  lidocaine   Patch 1 Patch Transdermal daily  methylPREDNISolone sodium succinate Injectable 40 milliGRAM(s) IV Push every 8 hours  metoprolol     tartrate 12.5 milliGRAM(s) Oral every 12 hours  ondansetron Injectable 4 milliGRAM(s) IV Push once  pantoprazole    Tablet 40 milliGRAM(s) Oral before breakfast  piperacillin/tazobactam IVPB. 3.375 Gram(s) IV Intermittent every 8 hours  polyethylene glycol 3350 17 Gram(s) Oral at bedtime  vancomycin  IVPB 1000 milliGRAM(s) IV Intermittent every 12 hours    MEDICATIONS  (PRN):  acetaminophen   Tablet 650 milliGRAM(s) Oral every 6 hours PRN Mild Pain (1 - 3)  ALPRAZolam 0.5 milliGRAM(s) Oral at bedtime PRN anxiety  HYDROcodone/homatropine Syrup 5 milliLiter(s) Oral every 6 hours PRN Cough  oxyCODONE    IR 5 milliGRAM(s) Oral every 4 hours PRN Moderate Pain (4 - 6)    Vital Signs Last 24 Hrs  T(C): 37.1 (15 Nov 2017 10:30), Max: 38.6 (15 Nov 2017 08:45)  T(F): 98.8 (15 Nov 2017 10:30), Max: 101.4 (15 Nov 2017 08:45)  HR: 107 (15 Nov 2017 10:30) (107 - 136)  BP: 107/80 (15 Nov 2017 10:30) (95/75 - 147/96)  BP(mean): 87 (15 Nov 2017 10:30) (79 - 87)  RR: 19 (15 Nov 2017 10:30) (18 - 36)  SpO2: 97% (15 Nov 2017 10:30) (92% - 100%)    I&O's Summary    2017 07:01  -  15 Nov 2017 07:00  --------------------------------------------------------  IN: 0 mL / OUT: 300 mL / NET: -300 mL    15 Nov 2017 07:01  -  15 Nov 2017 10:48  --------------------------------------------------------  IN: 925 mL / OUT: 0 mL / NET: 925 mL          Physical Exam:   GENERAL: NAD, well-groomed, well-developed  HEENT: GIOVANI/   Atraumatic, Normocephalic  ENMT: No tonsillar erythema, exudates, or enlargement; Moist mucous membranes, Good dentition, No lesions  NECK: Supple, No JVD, Normal thyroid  CHEST/LUNG: Wheezing+  CVS: Regular rate and rhythm; No murmurs, rubs, or gallops  GI: : Soft, Nontender, Nondistended; Bowel sounds present  NERVOUS SYSTEM:  Alert & Oriented X3  EXTREMITIES:  2+ Peripheral Pulses, No clubbing, cyanosis, or edema  LYMPH: No lymphadenopathy noted  SKIN: No rashes or lesions  ENDOCRINOLOGY: No Thyromegaly  PSYCH: Appropriate    Labs:  ABG - ( 15 Nov 2017 09:00 )  pH: 7.34  /  pCO2: 74    /  pO2: 190   / HCO3: 35    / Base Excess: 12.4  /  SaO2: 99.7                           10.0   11.41 )-----------( 94       ( 15 Nov 2017 09:00 )             32.2                         10.7   12.15 )-----------( 110      ( 2017 06:00 )             34.9                         11.2   13.41 )-----------( 124      ( 2017 11:29 )             36.1                         10.4   10.05 )-----------( 155      ( 2017 06:13 )             32.8     11-15    135  |  91<L>  |  16  ----------------------------<  129<H>  5.0   |  35<H>  |  0.67      138  |  96<L>  |  15  ----------------------------<  86  4.3   |  33<H>  |  0.73    Ca    8.8      15 Nov 2017 09:00      CAPILLARY BLOOD GLUCOSE            PT/INR - ( 2017 11:29 )   PT: 13.7 SEC;   INR: 1.22          PTT - ( 2017 11:29 )  PTT:28.0 SEC  Urinalysis Basic - ( 2017 12:00 )    Color: PLYEL / Appearance: CLEAR / S.014 / pH: 6.5  Gluc: NEGATIVE / Ketone: NEGATIVE  / Bili: NEGATIVE / Urobili: NORMAL E.U.   Blood: TRACE / Protein: 10 / Nitrite: NEGATIVE   Leuk Esterase: NEGATIVE / RBC: 5-10 / WBC 2-5   Sq Epi: OCC / Non Sq Epi: x / Bacteria: x      Cultures:           Wound culture:                 @ 00:44  Organism --  Culture w/ gram stain --  Specimen Source SPUTUM    Wound culture:                 @ 13:26  Organism --  Culture w/ gram stain --  Specimen Source URINE MIDSTREAM    Wound culture:                 @ 13:20  Organism --  Culture w/ gram stain --  Specimen Source BLOOD PERIPHERAL      Abscess culture:              @ 00:44  Organism --  Gram Stain --  Specimen Source SPUTUM    Abscess culture:              @ 13:26  Organism --  Gram Stain --  Specimen Source URINE MIDSTREAM    Abscess culture:              @ 13:20  Organism --  Gram Stain --  Specimen Source BLOOD PERIPHERAL        Tissue culture:            @ 00:44  Organism --  Gram Stain --  Specimen Source SPUTUM    Tissue culture:            @ 13:26  Organism --  Gram Stain --  Specimen Source URINE MIDSTREAM    Tissue culture:            @ 13:20  Organism --  Gram Stain --  Specimen Source BLOOD PERIPHERAL      Body Fluid Smear & Culture:                         @ 00:44  AFB Smear  --  Culture Acid Fast Body Fluid w/ Smear  --  Culture Acid Fast Smear Concentrated   --    Culture Results:     --  Specimen Source SPUTUM    Body Fluid Smear & Culture:                         @ 13:26  AFB Smear  --  Culture Acid Fast Body Fluid w/ Smear  --  Culture Acid Fast Smear Concentrated   --    Culture Results:     --  Specimen Source URINE MIDSTREAM    Body Fluid Smear & Culture:                         @ 13:20  AFB Smear  --  Culture Acid Fast Body Fluid w/ Smear  --  Culture Acid Fast Smear Concentrated   --    Culture Results:     --  Specimen Source BLOOD PERIPHERAL        Rapid Respiratory Viral Panel Result         @ 15:46  Rapid RVP --  Coronovirus --  Adenovirus NOT DETECTED  Bordetella Pertussis NOT DETECTED  Chlamydia Pneumonia NOT DETECTED  Entero/RhinovirusNOT DETECTED  HKU1 Coronovirus --  HMPV Coronovirus NOT DETECTED  Influenza A NOT DETECTED (any subtype)  Influenza AH1 NOT DETECTED  Influenza AH1 2009 NOT DETECTED  Influenza AH3 NOT DETECTED  Influenza B NOT DETECTED  Mycoplasma Pneumoniae NOT DETECTED This nucleic acid amplification assay was performed using  the IMVUArray. The following pathogens are tested  for: Adenovirus, Coronavirus 229E, Coronavirus HKU1,  Coronavirus NL63, Coronavirus OC43, Human Metapneumovirus  (HMPV), Rhinovirus/Enterovirus, Influenza A H1, Influenza A  H1 2009 (Pandemic H1 2009), Influenza A H3, Influenza A (Flu  A) subtype not identified, Influenza B, Parainfluenza Virus  (types 1, 2, 3, 4), Respiratory Syncytial Virus (RSV),  Bordetella pertussis, Chlamydophila pneumoniae, and  Mycoplasma pneumoniae. A negative FilmArray result does not  always exclude the possibility of viral or bacterial  infection. Laboratory results should always be interpreted  in the context of clinical findings.  NL63 Coronovirus --  OC43 Coronovirus --  Parainfluenza 1 NOT DETECTED  Parainfluenza 2 NOT DETECTED  Parainfluenza 3 NOT DETECTED  Parainfluenza 4 NOT DETECTED  Resp Syncytial Virus NOT DETECTED      < from: Xray Chest 1 View AP -PORTABLE-Routine (17 @ 07:52) >    EXAM:  RAD CHEST PORTABLE ROUTINE        PROCEDURE DATE:  2017         INTERPRETATION:  Clinical information: Follow-up pneumothorax.    Findings:. Portable frontal view of the chest dated 2017 is   compared to 2017. Moderate left pneumothorax is unchanged. Trace   bilateral pleural effusions are stable. Postsurgical changes are again   noted in the left lung. Bilateral upper lobe scarring is unchanged. No   other changes are present.    Impression: Stable moderate left pneumothorax.                  NURA GUERRERO M.D., ATTENDING RADIOLOGIST  This document has been electronically signed. 2017  2:08PM    < end of copied text >      Studies  Chest X-RAY  CT SCAN Chest   Venous Dopplers: LE:   CT Abdomen  Others

## 2017-11-15 NOTE — PROGRESS NOTE ADULT - ASSESSMENT
47 year old with sarcoidosis and COPD presents after recent andree surgery with recurrent hemoptysis.     At this time, he also has fever.    His CT has peribronchial changes.    I would check an RVP    With bronch, please check routine, fungal, and AFB cultures    Continue vanco/ zosyn    After bronch, resume voriconazole.

## 2017-11-15 NOTE — PROGRESS NOTE ADULT - ATTENDING COMMENTS
No hemoptysis in last 24 hours: However toughest decision would be to whether to reinitiate AC or not?    11/13: Difficult situation: having hemoptysis but needs AC, which can not be started!! HAS pfo TOO AS WELL AS paf : : :   HAD LEFT UPPER LOBECTOMY FOR HEMOPTYSIS with extensive sarcoid fibrosis of lungs: with postoperative course pretty prolonged complicated by persistent ptx!: Had blood patch as well as talc pleurodesis!! Now with ptx and hemoptysis again:  11/14: Needs repeat bronch and IR embolization given continued hemoptysis  11/15: septic with increased wheezing and mild hypercarbic respiratory failure: mild today: on steroids:  and broad spectrum antibiotics:   CANDIDATE FOR LUNG TRANSPLANT??: JEREMY CRUZ WILL DECIDE AFTER THE bronchoscopy!

## 2017-11-15 NOTE — PROGRESS NOTE ADULT - SUBJECTIVE AND OBJECTIVE BOX
OSCAR CHACKO  MRN-[id number]    HPI:  47 year old male with a PMHx of sarcoidosis, HTN, asthma, and COPD presents to the ED with severe leg spasms and headache leading to a presyncopal episode, found to have a significant abnormal EKG and moderate inspiratory and expiratory wheezing in the setting of Entero/Rhino virus, admitted to tele for Near Syncope, r/o ACS and COPD exacerbation.  Hospital course complicated by stroke and PAF- AC on hold due to hemoptysis; 9/21-FB/BAL done by Dr. Arias. Intraop: Bleeding from DAVID (non-lingular)-Recom IR to embolize; 9/22 CT angio done per IR Lobko request.  Formal PFTS and ask Card to fix PFO now to decrease blood flow; 9/24- Developed worsening hemoptysis. Emergently kristel to OR and had  Left thoractomy DAVID. Brought to CTICU.  9/24: 1400ml EBL in OR; 10 u PRBCs;9/25-Brought back to OR for bleeding. Had Reop Thoracotomy, evac left hemothorax. CTI- DAVID space despite -40 Sxn, MEYER, L Hemiparesis (CVA) better w walking; Patient post op with prolongd air leak shila ptx, bedside pleurodesis done.  Developed SIRS after pleurodesis-> CTICU; On pressors. Pt. clinically improved in CTICU. Maintained on Steroids, weaned down to Solumedrol BID. Off pressors and transitioned to Midodrine. Still with thrombocytopenia but no bleeding. Continued on Eliquis but no ASA for h/o CVA. Neuro deficits improving.  Air leak resolved on 10/27. 10/30 Chest tube removed, cxr stable.  Steriods to po taper.  Patient stable for discharge.  11/2-Pt ambulating w PT frequently. Feels well. Pain controlled. All labs and vital signs stable. Wound healing. Arnold po steroid taper. To complete Voriconazole on Nov 4th. CXRs and reports reviewed by and cleared by Dr. Caldera. Pt. to fu for PFO closure as output. Still requires intermittent oxygen. Cleared for discharge by Dr. Caldera to acute rehab.    Patient presented to [facility] after rehab send patient with hemoptysis, patient had 2 episodes of hemoptysis, less than 10cc in total for 24 hours.  Patient states it happens when he coughs, blood tinged sputum.  Patient otherwise doing well in rehab. (09 Nov 2017 00:32)      Procedure: Left VATS and DAVID lobectomy 9/24/17 [POD # 18]  Re-op L thoracotomy, evacuation of hemothorax (600cc clot, 400cc blood) no obvious site of bleeding  9/25/17 [POD # 17]  Re-op L thoracotomy, evacuation of hemothorax; RTOR x2 100cc clot; 200cc blood: bleeding posterior intercostal arteries identified and cauterized 9/25/17 [POD # 17]  Pt was transferred to ICU because of increased SOB on the floor. Pt also had increased left apical pneumo for which I.R put a   Pigtail catheter. 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  COPD (chronic obstructive pulmonary disease)  Asthma  Hypertension  Sarcoidosis  History of thoracotomy: 9/24/17 Left thoracotomy, Left upper lobectomy   9/25/17 Reop left thoracotomy, evacuation of left hemothorax      ***VITAL SIGNS:  Vital Signs Last 24 Hrs  T(C): 36.7 (15 Nov 2017 15:35), Max: 38.6 (15 Nov 2017 08:45)  T(F): 98.1 (15 Nov 2017 15:35), Max: 101.4 (15 Nov 2017 08:45)  HR: 87 (15 Nov 2017 17:24) (87 - 136)  BP: 119/86 (15 Nov 2017 17:00) (92/61 - 147/96)  BP(mean): 94 (15 Nov 2017 17:00) (69 - 94)  RR: 63 (15 Nov 2017 17:00) (13 - 63)  SpO2: 100% (15 Nov 2017 17:24) (95% - 100%)  I/Os:   I&O's Detail    14 Nov 2017 07:01  -  15 Nov 2017 07:00  --------------------------------------------------------  IN:  Total IN: 0 mL    OUT:    Voided: 300 mL  Total OUT: 300 mL    Total NET: -300 mL      15 Nov 2017 07:01  -  15 Nov 2017 18:14  --------------------------------------------------------  IN:    IV PiggyBack: 850 mL    Lactated Ringers IV Bolus: 500 mL    lactated ringers.: 75 mL    sodium chloride 0.9%.: 450 mL  Total IN: 1875 mL    OUT:    Voided: 190 mL  Total OUT: 190 mL    Total NET: 1685 mL        CAPILLARY BLOOD GLUCOSE        =======================  VENTILATOR SETTINGS  ===================  Mode: AC/ CMV (Assist Control/ Continuous Mandatory Ventilation)  RR (machine): 18  TV (machine): 500  FiO2: 40  PEEP: 5  MAP: 17  PIP: 39    ======================= PATIENT CARE ACCESS DEVICES ===================  Peripheral IV      ======================= PHYSICAL EXAM============================  General:                      intubated, on full vent support, s/p FB  Neuro:                         Moving all extremities to commands. No acute change from baseline exam.	  Respiratory:	Lungs coarse to auscultation bilaterally. + Blood in ET tube on suction, d/w Dr. Caldera                           Cont vent support        CV:		Regular rate and rhythm. Normal S1/S2. No murmurs                                          Distal pulses present.  Abdomen:	                     Soft, non-distended. Bowel sounds present   Skin:		No rash.  Extremities:	Warm, no cyanosis or edema.  Palpable pulses    ============================ LABS =========================                        9.5    11.18 )-----------( 84       ( 15 Nov 2017 13:24 )             29.8     11-15    135  |  91<L>  |  16  ----------------------------<  129<H>  5.0   |  35<H>  |  0.67    Ca    8.8      15 Nov 2017 09:00          ABG - ( 15 Nov 2017 16:15 )  pH: 7.37  /  pCO2: 66    /  pO2: 336   / HCO3: 34    / Base Excess: 11.2  /  SaO2: 100       ===================== IMAGING STUDIES =========================          =======================  MEDICATIONS  =================  MEDICATIONS  (STANDING):  albumin human  5% IVPB 250 milliLiter(s) IV Intermittent once  albumin human  5% IVPB 250 milliLiter(s) IV Intermittent once  atorvastatin 80 milliGRAM(s) Oral at bedtime  benzonatate 100 milliGRAM(s) Oral three times a day  buDESOnide 160 MICROgram(s)/formoterol 4.5 MICROgram(s) Inhaler 2 Puff(s) Inhalation two times a day  dexmedetomidine Infusion 0.3 MICROgram(s)/kG/Hr (5.625 mL/Hr) IV Continuous <Continuous>  diVALproex  milliGRAM(s) Oral two times a day  docusate sodium 100 milliGRAM(s) Oral two times a day  ferrous    sulfate 325 milliGRAM(s) Oral three times a day with meals  folic acid 1 milliGRAM(s) Oral daily  ipratropium    for Nebulization 500 MICROGram(s) Nebulizer every 6 hours  levalbuterol Inhalation 0.63 milliGRAM(s) Inhalation every 6 hours  lidocaine   Patch 1 Patch Transdermal daily  methylPREDNISolone sodium succinate Injectable 40 milliGRAM(s) IV Push every 8 hours  ondansetron Injectable 4 milliGRAM(s) IV Push once  pantoprazole    Tablet 40 milliGRAM(s) Oral before breakfast  phenylephrine    Infusion 2 MICROgram(s)/kG/Min (28.125 mL/Hr) IV Continuous <Continuous>  piperacillin/tazobactam IVPB. 3.375 Gram(s) IV Intermittent every 8 hours  polyethylene glycol 3350 17 Gram(s) Oral at bedtime  sodium chloride 0.9%. 1000 milliLiter(s) (75 mL/Hr) IV Continuous <Continuous>  vancomycin  IVPB 1000 milliGRAM(s) IV Intermittent every 12 hours  vancomycin  IVPB 1000 milliGRAM(s) IV Intermittent once  voriconazole IVPB 400 milliGRAM(s) IV Intermittent every 12 hours    MEDICATIONS  (PRN):  acetaminophen   Tablet 650 milliGRAM(s) Oral every 6 hours PRN Mild Pain (1 - 3)  ALPRAZolam 0.5 milliGRAM(s) Oral at bedtime PRN anxiety  HYDROcodone/homatropine Syrup 5 milliLiter(s) Oral every 6 hours PRN Cough  oxyCODONE    IR 5 milliGRAM(s) Oral every 4 hours PRN Moderate Pain (4 - 6)      ====================== NEUROLOGY=====================  Pt is on Precedex for sedation, nonfocal      ==================== RESPIRATORY======================  Full vent supported, s/p FB today, cont w small amt of hemoptysis,   D/W Dr. Caldera, keep vent support tonight, w sedation, d/c Heparin SQ, no AC due to active bleeding    Mechanical Ventilation:  Mode: AC/ CMV (Assist Control/ Continuous Mandatory Ventilation)  RR (machine): 18  TV (machine): 500  FiO2: 40  PEEP: 5  MAP: 17  PIP: 39    Mechanical ventilator staus assessed & settings reviewed  Continue bronchodilators, pulmonary toilet    ====================CARDIOVASCULAR==================  Continue hemodynamic monitoring.    Hypotension: On Midodrine.     PFO - hold AC due to active bleeding/hemoptysis     ===================== RENAL =========================  Continue IVF  Monitor I/Os and electrolytes   Keep [place]    ==================== GASTROINTESTINAL===================  Continue GI prophylaxis with Pepcid / Protonix  Continue Zofran / Reglan for nausea - PRN	  NPO    =======================    ENDOCRIN  =====================  Glycemic monitoring  F/S with coverage  ===================HEMATOLOGIC/ONC ===================   Thrombocytopenia - Monitor Platelets / Hb/Hct, Hold Heparin SQ and AC  Monitor hemoptysis  ========================INFECTIOUS DISEASE================  Fever, Increased WBC, D/W ID, Started Vanco/Zosyn/ Veroconozol  Blood cxs sent Monitor for fever / leukocytosis.  All surgical incision  sites look clean      Pertinent clinical, laboratory, radiographic, hemodynamic, echocardiographic, respiratory data, microbiologic data and chart were reviewed and analyzed frequently throughout the course of the day and night. GI and DVT prophylaxis, glycemic control, head of bed elevation and skin care issues were addressed.  Patient seen, examined and plan discussed with CT Surgery / CTICU team during rounds.    I have spent     45 minutes of critical care time with this pt between   7  am and 7pm      Flor Pepper DO, FACEP

## 2017-11-15 NOTE — BRIEF OPERATIVE NOTE - OPERATION/FINDINGS
Bronchoscopy.  Blood identified in both the right and left airways, predominately on the left.  100cc of old blood suctioned.  Airways flushed and suctioned; no identification of active bleeding.  Bronchoalveolar lavage performed and specimen sent.

## 2017-11-15 NOTE — BRIEF OPERATIVE NOTE - PROCEDURE
<<-----Click on this checkbox to enter Procedure Bronchoscopy with bronchoalveolar lavage  11/15/2017    Active  [id number]

## 2017-11-15 NOTE — BRIEF OPERATIVE NOTE - POST-OP DX
Hemoptysis  11/15/2017    Active  Azael Alcantara  Sarcoidosis  11/15/2017    Active  Azael Alcantara

## 2017-11-15 NOTE — PROGRESS NOTE ADULT - SUBJECTIVE AND OBJECTIVE BOX
Follow Up:      Inverval History/ROS:Patient is a 47y old  Male who presents with a chief complaint of hemoptysis (09 Nov 2017 00:32)    Had fever/ increased SOB with wheeze    Tx to CT ICU.    waiting for bronch .  Feels a little better now.        Allergies    No Known Allergies    Intolerances        ANTIMICROBIALS:  piperacillin/tazobactam IVPB. 3.375 every 8 hours  vancomycin  IVPB 1000 every 12 hours      OTHER MEDS:  acetaminophen   Tablet 650 milliGRAM(s) Oral every 6 hours PRN  ALPRAZolam 0.5 milliGRAM(s) Oral at bedtime PRN  atorvastatin 80 milliGRAM(s) Oral at bedtime  benzonatate 100 milliGRAM(s) Oral three times a day  buDESOnide 160 MICROgram(s)/formoterol 4.5 MICROgram(s) Inhaler 2 Puff(s) Inhalation two times a day  diVALproex  milliGRAM(s) Oral two times a day  docusate sodium 100 milliGRAM(s) Oral two times a day  ferrous    sulfate 325 milliGRAM(s) Oral three times a day with meals  folic acid 1 milliGRAM(s) Oral daily  HYDROcodone/homatropine Syrup 5 milliLiter(s) Oral every 6 hours PRN  ipratropium    for Nebulization 500 MICROGram(s) Nebulizer every 6 hours  lactated ringers Bolus 250 milliLiter(s) IV Bolus once  levalbuterol Inhalation 0.63 milliGRAM(s) Inhalation every 6 hours  lidocaine   Patch 1 Patch Transdermal daily  methylPREDNISolone sodium succinate Injectable 40 milliGRAM(s) IV Push every 8 hours  metoprolol     tartrate 12.5 milliGRAM(s) Oral every 12 hours  ondansetron Injectable 4 milliGRAM(s) IV Push once  oxyCODONE    IR 5 milliGRAM(s) Oral every 4 hours PRN  pantoprazole    Tablet 40 milliGRAM(s) Oral before breakfast  phenylephrine    Infusion 0.3 MICROgram(s)/kG/Min IV Continuous <Continuous>  polyethylene glycol 3350 17 Gram(s) Oral at bedtime  sodium chloride 0.9%. 1000 milliLiter(s) IV Continuous <Continuous>      Vital Signs Last 24 Hrs  T(C): 37.1 (15 Nov 2017 10:30), Max: 38.6 (15 Nov 2017 08:45)  T(F): 98.8 (15 Nov 2017 10:30), Max: 101.4 (15 Nov 2017 08:45)  HR: 101 (15 Nov 2017 12:00) (101 - 136)  BP: 110/76 (15 Nov 2017 12:00) (95/75 - 147/96)  BP(mean): 85 (15 Nov 2017 12:00) (79 - 87)  RR: 20 (15 Nov 2017 12:00) (18 - 36)  SpO2: 99% (15 Nov 2017 12:00) (95% - 100%)    PHYSICAL EXAM:  General: [ ] non-toxic  HEAD/EYES: [ ] PERRL [x ] white sclera [ ] icterus  ENT:  [ ] normal [ x] supple [ ] thrush [ ] pharyngeal exudate  Cardiovascular:   [ ] murmur [x ] normal [ ] PPM/AICD  Respiratory:  [x ] course BS  GI:  [ x] soft, non-tender, normal bowel sounds  :  [ ] mock [ ] no CVA tenderness   Musculoskeletal:  [ ] no synovitis  Neurologic:  [ ] non-focal exam   Skin:  [x ] no rash  Lymph: [ ] no lymphadenopathy  Psychiatric:  [ x] appropriate affect [ ] alert & oriented  Lines:  x[ ] no phlebitis [ ] central line                                10.0   11.41 )-----------( 94       ( 15 Nov 2017 09:00 )             32.2       11-15    135  |  91<L>  |  16  ----------------------------<  129<H>  5.0   |  35<H>  |  0.67    Ca    8.8      15 Nov 2017 09:00            MICROBIOLOGY:Culture - Respiratory:   NRF^Normal Respiratory Katherine  QUANTITY OF GROWTH: FEW (11-14-17 @ 00:44)      RADIOLOGY:

## 2017-11-15 NOTE — PROGRESS NOTE ADULT - PROBLEM SELECTOR PLAN 2
Cont BD with inhaled steroids and oral steroids  11/114: Pts wheezing has significantly improved: he will need long term steroids as he starts wheezing once his steroids are dced!  11/15: Wheezing has increased: started on IV steroids high dose by thoracic team: Would suggest to decrease to 20 mg three times a day !

## 2017-11-15 NOTE — PROGRESS NOTE ADULT - PROBLEM SELECTOR PLAN 1
? 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 ot from bronchiectasis? eliquis have been dced: needs card to se: In addition, he may need IR help with embolization, it his hemoptysis increases: He has had pleurodesis on the left side.  11/10 quantify hemoptysis. no AC. stable  11/11 no more hemoptysis. off AC  11/12 resolved. off AC  11/13: today had hemoptysis again----->localize site---> IR embolization: left a message for thoracic team  11/14: DW Dr Caldera: for repeat bronchoscopy to localize the site of bleeding and need IR help to embolize the culprit vessel!  11/15: started on broad spectrum antibiotics and for bronchoscopy today

## 2017-11-16 ENCOUNTER — TRANSCRIPTION ENCOUNTER (OUTPATIENT)
Age: 47
End: 2017-11-16

## 2017-11-16 LAB
ALBUMIN SERPL ELPH-MCNC: 3.1 G/DL — LOW (ref 3.3–5)
ALBUMIN SERPL ELPH-MCNC: 3.2 G/DL — LOW (ref 3.3–5)
ALP SERPL-CCNC: 62 U/L — SIGNIFICANT CHANGE UP (ref 40–120)
ALP SERPL-CCNC: 65 U/L — SIGNIFICANT CHANGE UP (ref 40–120)
ALT FLD-CCNC: 11 U/L — SIGNIFICANT CHANGE UP (ref 4–41)
ALT FLD-CCNC: 7 U/L — SIGNIFICANT CHANGE UP (ref 4–41)
APTT BLD: 25.6 SEC — LOW (ref 27.5–37.4)
APTT BLD: 26.1 SEC — LOW (ref 27.5–37.4)
AST SERPL-CCNC: 8 U/L — SIGNIFICANT CHANGE UP (ref 4–40)
AST SERPL-CCNC: 9 U/L — SIGNIFICANT CHANGE UP (ref 4–40)
BACTERIA SPT RESP CULT: SIGNIFICANT CHANGE UP
BASE EXCESS BLDA CALC-SCNC: 8.4 MMOL/L — SIGNIFICANT CHANGE UP
BASE EXCESS BLDA CALC-SCNC: 8.5 MMOL/L — SIGNIFICANT CHANGE UP
BASE EXCESS BLDA CALC-SCNC: 8.9 MMOL/L — SIGNIFICANT CHANGE UP
BASE EXCESS BLDA CALC-SCNC: 9.8 MMOL/L — SIGNIFICANT CHANGE UP
BASOPHILS # BLD AUTO: 0.01 K/UL — SIGNIFICANT CHANGE UP (ref 0–0.2)
BASOPHILS NFR BLD AUTO: 0.1 % — SIGNIFICANT CHANGE UP (ref 0–2)
BILIRUB SERPL-MCNC: 0.2 MG/DL — SIGNIFICANT CHANGE UP (ref 0.2–1.2)
BILIRUB SERPL-MCNC: 0.4 MG/DL — SIGNIFICANT CHANGE UP (ref 0.2–1.2)
BUN SERPL-MCNC: 13 MG/DL — SIGNIFICANT CHANGE UP (ref 7–23)
BUN SERPL-MCNC: 16 MG/DL — SIGNIFICANT CHANGE UP (ref 7–23)
CA-I BLDA-SCNC: 1.13 MMOL/L — LOW (ref 1.15–1.29)
CA-I BLDA-SCNC: 1.13 MMOL/L — LOW (ref 1.15–1.29)
CA-I BLDA-SCNC: 1.15 MMOL/L — SIGNIFICANT CHANGE UP (ref 1.15–1.29)
CALCIUM SERPL-MCNC: 8.6 MG/DL — SIGNIFICANT CHANGE UP (ref 8.4–10.5)
CALCIUM SERPL-MCNC: 9.1 MG/DL — SIGNIFICANT CHANGE UP (ref 8.4–10.5)
CHLORIDE BLDA-SCNC: 94 MMOL/L — LOW (ref 96–108)
CHLORIDE SERPL-SCNC: 91 MMOL/L — LOW (ref 98–107)
CHLORIDE SERPL-SCNC: 92 MMOL/L — LOW (ref 98–107)
CO2 SERPL-SCNC: 30 MMOL/L — SIGNIFICANT CHANGE UP (ref 22–31)
CO2 SERPL-SCNC: 33 MMOL/L — HIGH (ref 22–31)
CREAT SERPL-MCNC: 0.71 MG/DL — SIGNIFICANT CHANGE UP (ref 0.5–1.3)
CREAT SERPL-MCNC: 0.76 MG/DL — SIGNIFICANT CHANGE UP (ref 0.5–1.3)
CULTURE - ACID FAST SMEAR CONCENTRATED: SIGNIFICANT CHANGE UP
EOSINOPHIL # BLD AUTO: 0 K/UL — SIGNIFICANT CHANGE UP (ref 0–0.5)
EOSINOPHIL NFR BLD AUTO: 0 % — SIGNIFICANT CHANGE UP (ref 0–6)
GLUCOSE BLDA-MCNC: 181 MG/DL — HIGH (ref 70–99)
GLUCOSE BLDA-MCNC: 187 MG/DL — HIGH (ref 70–99)
GLUCOSE BLDA-MCNC: 196 MG/DL — HIGH (ref 70–99)
GLUCOSE BLDA-MCNC: 212 MG/DL — HIGH (ref 70–99)
GLUCOSE SERPL-MCNC: 186 MG/DL — HIGH (ref 70–99)
GLUCOSE SERPL-MCNC: 219 MG/DL — HIGH (ref 70–99)
HCO3 BLDA-SCNC: 32 MMOL/L — HIGH (ref 22–26)
HCO3 BLDA-SCNC: 33 MMOL/L — HIGH (ref 22–26)
HCT VFR BLD CALC: 28.6 % — LOW (ref 39–50)
HCT VFR BLD CALC: 29.5 % — LOW (ref 39–50)
HCT VFR BLDA CALC: 26.5 % — LOW (ref 39–51)
HCT VFR BLDA CALC: 29.3 % — LOW (ref 39–51)
HCT VFR BLDA CALC: 30.2 % — LOW (ref 39–51)
HCT VFR BLDA CALC: 30.5 % — LOW (ref 39–51)
HGB BLD-MCNC: 9.1 G/DL — LOW (ref 13–17)
HGB BLD-MCNC: 9.5 G/DL — LOW (ref 13–17)
HGB BLDA-MCNC: 8.5 G/DL — LOW (ref 13–17)
HGB BLDA-MCNC: 9.5 G/DL — LOW (ref 13–17)
HGB BLDA-MCNC: 9.8 G/DL — LOW (ref 13–17)
HGB BLDA-MCNC: 9.9 G/DL — LOW (ref 13–17)
IMM GRANULOCYTES # BLD AUTO: 0.09 # — SIGNIFICANT CHANGE UP
IMM GRANULOCYTES NFR BLD AUTO: 0.6 % — SIGNIFICANT CHANGE UP (ref 0–1.5)
INR BLD: 1.28 — HIGH (ref 0.88–1.17)
INR BLD: 1.31 — HIGH (ref 0.88–1.17)
LACTATE BLDA-SCNC: 1.3 MMOL/L — SIGNIFICANT CHANGE UP (ref 0.5–2)
LACTATE BLDA-SCNC: 1.3 MMOL/L — SIGNIFICANT CHANGE UP (ref 0.5–2)
LACTATE BLDA-SCNC: 1.8 MMOL/L — SIGNIFICANT CHANGE UP (ref 0.5–2)
LYMPHOCYTES # BLD AUTO: 0.93 K/UL — LOW (ref 1–3.3)
LYMPHOCYTES # BLD AUTO: 6.1 % — LOW (ref 13–44)
MAGNESIUM SERPL-MCNC: 1.5 MG/DL — LOW (ref 1.6–2.6)
MCHC RBC-ENTMCNC: 30.4 PG — SIGNIFICANT CHANGE UP (ref 27–34)
MCHC RBC-ENTMCNC: 31.4 PG — SIGNIFICANT CHANGE UP (ref 27–34)
MCHC RBC-ENTMCNC: 31.8 % — LOW (ref 32–36)
MCHC RBC-ENTMCNC: 32.2 % — SIGNIFICANT CHANGE UP (ref 32–36)
MCV RBC AUTO: 95.7 FL — SIGNIFICANT CHANGE UP (ref 80–100)
MCV RBC AUTO: 97.4 FL — SIGNIFICANT CHANGE UP (ref 80–100)
MONOCYTES # BLD AUTO: 1.02 K/UL — HIGH (ref 0–0.9)
MONOCYTES NFR BLD AUTO: 6.7 % — SIGNIFICANT CHANGE UP (ref 2–14)
NEUTROPHILS # BLD AUTO: 13.15 K/UL — HIGH (ref 1.8–7.4)
NEUTROPHILS NFR BLD AUTO: 86.5 % — HIGH (ref 43–77)
NRBC # FLD: 0 — SIGNIFICANT CHANGE UP
NRBC # FLD: 0 — SIGNIFICANT CHANGE UP
PCO2 BLDA: 47 MMHG — SIGNIFICANT CHANGE UP (ref 35–48)
PCO2 BLDA: 52 MMHG — HIGH (ref 35–48)
PCO2 BLDA: 60 MMHG — HIGH (ref 35–48)
PCO2 BLDA: 60 MMHG — HIGH (ref 35–48)
PH BLDA: 7.37 PH — SIGNIFICANT CHANGE UP (ref 7.35–7.45)
PH BLDA: 7.38 PH — SIGNIFICANT CHANGE UP (ref 7.35–7.45)
PH BLDA: 7.42 PH — SIGNIFICANT CHANGE UP (ref 7.35–7.45)
PH BLDA: 7.47 PH — HIGH (ref 7.35–7.45)
PHOSPHATE SERPL-MCNC: 6 MG/DL — HIGH (ref 2.5–4.5)
PLATELET # BLD AUTO: 73 K/UL — LOW (ref 150–400)
PLATELET # BLD AUTO: 89 K/UL — LOW (ref 150–400)
PMV BLD: 14.5 FL — HIGH (ref 7–13)
PMV BLD: SIGNIFICANT CHANGE UP FL (ref 7–13)
PO2 BLDA: 111 MMHG — HIGH (ref 83–108)
PO2 BLDA: 206 MMHG — HIGH (ref 83–108)
PO2 BLDA: 220 MMHG — HIGH (ref 83–108)
PO2 BLDA: 342 MMHG — HIGH (ref 83–108)
POTASSIUM BLDA-SCNC: 4.5 MMOL/L — SIGNIFICANT CHANGE UP (ref 3.4–4.5)
POTASSIUM BLDA-SCNC: 4.5 MMOL/L — SIGNIFICANT CHANGE UP (ref 3.4–4.5)
POTASSIUM BLDA-SCNC: 4.6 MMOL/L — HIGH (ref 3.4–4.5)
POTASSIUM BLDA-SCNC: 4.9 MMOL/L — HIGH (ref 3.4–4.5)
POTASSIUM SERPL-MCNC: 4.9 MMOL/L — SIGNIFICANT CHANGE UP (ref 3.5–5.3)
POTASSIUM SERPL-MCNC: 5 MMOL/L — SIGNIFICANT CHANGE UP (ref 3.5–5.3)
POTASSIUM SERPL-SCNC: 4.9 MMOL/L — SIGNIFICANT CHANGE UP (ref 3.5–5.3)
POTASSIUM SERPL-SCNC: 5 MMOL/L — SIGNIFICANT CHANGE UP (ref 3.5–5.3)
PROT SERPL-MCNC: 6.2 G/DL — SIGNIFICANT CHANGE UP (ref 6–8.3)
PROT SERPL-MCNC: 6.4 G/DL — SIGNIFICANT CHANGE UP (ref 6–8.3)
PROTHROM AB SERPL-ACNC: 14.4 SEC — HIGH (ref 9.8–13.1)
PROTHROM AB SERPL-ACNC: 14.7 SEC — HIGH (ref 9.8–13.1)
RBC # BLD: 2.99 M/UL — LOW (ref 4.2–5.8)
RBC # BLD: 3.03 M/UL — LOW (ref 4.2–5.8)
RBC # FLD: 14.3 % — SIGNIFICANT CHANGE UP (ref 10.3–14.5)
RBC # FLD: 14.4 % — SIGNIFICANT CHANGE UP (ref 10.3–14.5)
SAO2 % BLDA: 100 % — HIGH (ref 95–99)
SAO2 % BLDA: 100 % — HIGH (ref 95–99)
SAO2 % BLDA: 98.6 % — SIGNIFICANT CHANGE UP (ref 95–99)
SAO2 % BLDA: 99.9 % — HIGH (ref 95–99)
SODIUM BLDA-SCNC: 127 MMOL/L — LOW (ref 136–146)
SODIUM BLDA-SCNC: 128 MMOL/L — LOW (ref 136–146)
SODIUM SERPL-SCNC: 133 MMOL/L — LOW (ref 135–145)
SODIUM SERPL-SCNC: 135 MMOL/L — SIGNIFICANT CHANGE UP (ref 135–145)
SPECIMEN SOURCE: SIGNIFICANT CHANGE UP
WBC # BLD: 15.2 K/UL — HIGH (ref 3.8–10.5)
WBC # BLD: 8.92 K/UL — SIGNIFICANT CHANGE UP (ref 3.8–10.5)
WBC # FLD AUTO: 15.2 K/UL — HIGH (ref 3.8–10.5)
WBC # FLD AUTO: 8.92 K/UL — SIGNIFICANT CHANGE UP (ref 3.8–10.5)

## 2017-11-16 PROCEDURE — 31645 BRNCHSC W/THER ASPIR 1ST: CPT

## 2017-11-16 PROCEDURE — 37244 VASC EMBOLIZE/OCCLUDE BLEED: CPT

## 2017-11-16 PROCEDURE — 93306 TTE W/DOPPLER COMPLETE: CPT | Mod: 26

## 2017-11-16 PROCEDURE — 31624 DX BRONCHOSCOPE/LAVAGE: CPT

## 2017-11-16 PROCEDURE — 99291 CRITICAL CARE FIRST HOUR: CPT

## 2017-11-16 PROCEDURE — 76937 US GUIDE VASCULAR ACCESS: CPT | Mod: 26

## 2017-11-16 PROCEDURE — 36216 PLACE CATHETER IN ARTERY: CPT

## 2017-11-16 PROCEDURE — 99232 SBSQ HOSP IP/OBS MODERATE 35: CPT

## 2017-11-16 PROCEDURE — 71010: CPT | Mod: 26,76

## 2017-11-16 RX ORDER — INSULIN LISPRO 100/ML
VIAL (ML) SUBCUTANEOUS EVERY 6 HOURS
Qty: 0 | Refills: 0 | Status: DISCONTINUED | OUTPATIENT
Start: 2017-11-16 | End: 2017-12-28

## 2017-11-16 RX ORDER — SODIUM CHLORIDE 9 MG/ML
250 INJECTION INTRAMUSCULAR; INTRAVENOUS; SUBCUTANEOUS ONCE
Qty: 0 | Refills: 0 | Status: DISCONTINUED | OUTPATIENT
Start: 2017-11-16 | End: 2017-11-19

## 2017-11-16 RX ORDER — FENTANYL CITRATE 50 UG/ML
25 INJECTION INTRAVENOUS ONCE
Qty: 0 | Refills: 0 | Status: DISCONTINUED | OUTPATIENT
Start: 2017-11-16 | End: 2017-11-16

## 2017-11-16 RX ORDER — MAGNESIUM SULFATE 500 MG/ML
2 VIAL (ML) INJECTION ONCE
Qty: 0 | Refills: 0 | Status: COMPLETED | OUTPATIENT
Start: 2017-11-16 | End: 2017-11-16

## 2017-11-16 RX ORDER — SODIUM CHLORIDE 9 MG/ML
1000 INJECTION INTRAMUSCULAR; INTRAVENOUS; SUBCUTANEOUS
Qty: 0 | Refills: 0 | Status: DISCONTINUED | OUTPATIENT
Start: 2017-11-16 | End: 2017-11-19

## 2017-11-16 RX ORDER — DEXTROSE 50 % IN WATER 50 %
25 SYRINGE (ML) INTRAVENOUS ONCE
Qty: 0 | Refills: 0 | Status: DISCONTINUED | OUTPATIENT
Start: 2017-11-16 | End: 2017-11-22

## 2017-11-16 RX ORDER — ALBUTEROL 90 UG/1
2 AEROSOL, METERED ORAL EVERY 6 HOURS
Qty: 0 | Refills: 0 | Status: DISCONTINUED | OUTPATIENT
Start: 2017-11-16 | End: 2017-11-27

## 2017-11-16 RX ORDER — SODIUM CHLORIDE 9 MG/ML
1000 INJECTION, SOLUTION INTRAVENOUS
Qty: 0 | Refills: 0 | Status: DISCONTINUED | OUTPATIENT
Start: 2017-11-16 | End: 2017-11-22

## 2017-11-16 RX ORDER — PHENYLEPHRINE HYDROCHLORIDE 10 MG/ML
0.3 INJECTION INTRAVENOUS
Qty: 80 | Refills: 0 | Status: DISCONTINUED | OUTPATIENT
Start: 2017-11-16 | End: 2017-11-16

## 2017-11-16 RX ORDER — PHENYLEPHRINE HYDROCHLORIDE 10 MG/ML
0.1 INJECTION INTRAVENOUS
Qty: 80 | Refills: 0 | Status: DISCONTINUED | OUTPATIENT
Start: 2017-11-16 | End: 2017-11-22

## 2017-11-16 RX ORDER — PANTOPRAZOLE SODIUM 20 MG/1
40 TABLET, DELAYED RELEASE ORAL DAILY
Qty: 0 | Refills: 0 | Status: DISCONTINUED | OUTPATIENT
Start: 2017-11-16 | End: 2017-11-19

## 2017-11-16 RX ORDER — DEXTROSE 50 % IN WATER 50 %
1 SYRINGE (ML) INTRAVENOUS ONCE
Qty: 0 | Refills: 0 | Status: DISCONTINUED | OUTPATIENT
Start: 2017-11-16 | End: 2017-11-22

## 2017-11-16 RX ORDER — MIDAZOLAM HYDROCHLORIDE 1 MG/ML
5 INJECTION, SOLUTION INTRAMUSCULAR; INTRAVENOUS
Qty: 100 | Refills: 0 | Status: DISCONTINUED | OUTPATIENT
Start: 2017-11-16 | End: 2017-11-19

## 2017-11-16 RX ORDER — SODIUM CHLORIDE 9 MG/ML
250 INJECTION INTRAMUSCULAR; INTRAVENOUS; SUBCUTANEOUS ONCE
Qty: 0 | Refills: 0 | Status: COMPLETED | OUTPATIENT
Start: 2017-11-16 | End: 2017-11-16

## 2017-11-16 RX ORDER — DEXTROSE 50 % IN WATER 50 %
12.5 SYRINGE (ML) INTRAVENOUS ONCE
Qty: 0 | Refills: 0 | Status: DISCONTINUED | OUTPATIENT
Start: 2017-11-16 | End: 2017-11-22

## 2017-11-16 RX ORDER — GLUCAGON INJECTION, SOLUTION 0.5 MG/.1ML
1 INJECTION, SOLUTION SUBCUTANEOUS ONCE
Qty: 0 | Refills: 0 | Status: DISCONTINUED | OUTPATIENT
Start: 2017-11-16 | End: 2017-11-22

## 2017-11-16 RX ADMIN — Medication 250 MILLIGRAM(S): at 11:30

## 2017-11-16 RX ADMIN — CISATRACURIUM BESYLATE 9 MICROGRAM(S)/KG/MIN: 2 INJECTION INTRAVENOUS at 20:47

## 2017-11-16 RX ADMIN — PIPERACILLIN AND TAZOBACTAM 25 GRAM(S): 4; .5 INJECTION, POWDER, LYOPHILIZED, FOR SOLUTION INTRAVENOUS at 03:39

## 2017-11-16 RX ADMIN — SODIUM CHLORIDE 30 MILLILITER(S): 9 INJECTION INTRAMUSCULAR; INTRAVENOUS; SUBCUTANEOUS at 20:49

## 2017-11-16 RX ADMIN — Medication 1 PUFF(S): at 23:05

## 2017-11-16 RX ADMIN — SODIUM CHLORIDE 750 MILLILITER(S): 9 INJECTION INTRAMUSCULAR; INTRAVENOUS; SUBCUTANEOUS at 20:49

## 2017-11-16 RX ADMIN — FENTANYL CITRATE 25 MICROGRAM(S): 50 INJECTION INTRAVENOUS at 20:00

## 2017-11-16 RX ADMIN — PANTOPRAZOLE SODIUM 40 MILLIGRAM(S): 20 TABLET, DELAYED RELEASE ORAL at 14:00

## 2017-11-16 RX ADMIN — CISATRACURIUM BESYLATE 9 MICROGRAM(S)/KG/MIN: 2 INJECTION INTRAVENOUS at 13:58

## 2017-11-16 RX ADMIN — PIPERACILLIN AND TAZOBACTAM 25 GRAM(S): 4; .5 INJECTION, POWDER, LYOPHILIZED, FOR SOLUTION INTRAVENOUS at 15:30

## 2017-11-16 RX ADMIN — PIPERACILLIN AND TAZOBACTAM 25 GRAM(S): 4; .5 INJECTION, POWDER, LYOPHILIZED, FOR SOLUTION INTRAVENOUS at 20:04

## 2017-11-16 RX ADMIN — FENTANYL CITRATE 7.5 MICROGRAM(S)/KG/HR: 50 INJECTION INTRAVENOUS at 20:48

## 2017-11-16 RX ADMIN — FENTANYL CITRATE 1 MICROGRAM(S)/KG/HR: 50 INJECTION INTRAVENOUS at 08:00

## 2017-11-16 RX ADMIN — SODIUM CHLORIDE 30 MILLILITER(S): 9 INJECTION INTRAMUSCULAR; INTRAVENOUS; SUBCUTANEOUS at 02:30

## 2017-11-16 RX ADMIN — Medication 50 GRAM(S): at 17:30

## 2017-11-16 RX ADMIN — MIDAZOLAM HYDROCHLORIDE 5 MG/HR: 1 INJECTION, SOLUTION INTRAMUSCULAR; INTRAVENOUS at 20:48

## 2017-11-16 RX ADMIN — FENTANYL CITRATE 7.5 MICROGRAM(S)/KG/HR: 50 INJECTION INTRAVENOUS at 07:43

## 2017-11-16 RX ADMIN — FENTANYL CITRATE 7.5 MICROGRAM(S)/KG/HR: 50 INJECTION INTRAVENOUS at 13:58

## 2017-11-16 RX ADMIN — BUDESONIDE AND FORMOTEROL FUMARATE DIHYDRATE 2 PUFF(S): 160; 4.5 AEROSOL RESPIRATORY (INHALATION) at 23:05

## 2017-11-16 RX ADMIN — FENTANYL CITRATE 1 MICROGRAM(S)/KG/HR: 50 INJECTION INTRAVENOUS at 14:15

## 2017-11-16 RX ADMIN — VORICONAZOLE 125 MILLIGRAM(S): 10 INJECTION, POWDER, LYOPHILIZED, FOR SOLUTION INTRAVENOUS at 10:00

## 2017-11-16 RX ADMIN — FENTANYL CITRATE 25 MICROGRAM(S): 50 INJECTION INTRAVENOUS at 20:15

## 2017-11-16 RX ADMIN — VORICONAZOLE 125 MILLIGRAM(S): 10 INJECTION, POWDER, LYOPHILIZED, FOR SOLUTION INTRAVENOUS at 21:18

## 2017-11-16 RX ADMIN — PHENYLEPHRINE HYDROCHLORIDE 8.44 MICROGRAM(S)/KG/MIN: 10 INJECTION INTRAVENOUS at 07:42

## 2017-11-16 RX ADMIN — Medication 20 MILLIGRAM(S): at 14:14

## 2017-11-16 RX ADMIN — ALBUTEROL 2 PUFF(S): 90 AEROSOL, METERED ORAL at 16:06

## 2017-11-16 RX ADMIN — FENTANYL CITRATE 1 MICROGRAM(S)/KG/HR: 50 INJECTION INTRAVENOUS at 20:49

## 2017-11-16 RX ADMIN — DEXMEDETOMIDINE HYDROCHLORIDE IN 0.9% SODIUM CHLORIDE 5.62 MICROGRAM(S)/KG/HR: 4 INJECTION INTRAVENOUS at 13:58

## 2017-11-16 RX ADMIN — Medication 20 MILLIGRAM(S): at 23:33

## 2017-11-16 RX ADMIN — Medication 250 MILLIGRAM(S): at 23:33

## 2017-11-16 RX ADMIN — Medication 40 MILLIGRAM(S): at 06:20

## 2017-11-16 RX ADMIN — Medication 1 PUFF(S): at 16:06

## 2017-11-16 NOTE — DIETITIAN INITIAL EVALUATION ADULT. - OTHER INFO
Nutrition assessment initiated for critical care LOS. Pt. intubated , sedated, maintained NPO , noted 2+ L & R foot and ankle edema . Pt. known form last admission .

## 2017-11-16 NOTE — DIETITIAN INITIAL EVALUATION ADULT. - PERTINENT MEDS FT
Phenylephrine, Nimbex, Precedex, Vancomycin, VFEND, TESSALON PERLE, Lipitor, Colace, Protonix, Depakote ER, FeSO4, Folic acid, OL, Zosyn, Miralax

## 2017-11-16 NOTE — PROGRESS NOTE ADULT - SUBJECTIVE AND OBJECTIVE BOX
Follow Up:      Inverval History/ROS:Patient is a 47y old  Male who presents with a chief complaint of hemoptysis (09 Nov 2017 00:32)  Intubated/ sedated/ paralyzed    Febrile On pressors.    S/p bronch yesterday, then had embolization yesterday.       Allergies    No Known Allergies    Intolerances        ANTIMICROBIALS:  piperacillin/tazobactam IVPB. 3.375 every 8 hours  vancomycin  IVPB 1000 every 12 hours  voriconazole IVPB 400 every 12 hours  voriconazole IVPB 300 every 12 hours      OTHER MEDS:  ALBUTerol    90 MICROgram(s) HFA Inhaler 2 Puff(s) Inhalation every 6 hours  atorvastatin 80 milliGRAM(s) Oral at bedtime  benzonatate 100 milliGRAM(s) Oral three times a day  buDESOnide 160 MICROgram(s)/formoterol 4.5 MICROgram(s) Inhaler 2 Puff(s) Inhalation two times a day  cisatracurium Infusion 2 MICROgram(s)/kG/Min IV Continuous <Continuous>  dexmedetomidine Infusion 0.3 MICROgram(s)/kG/Hr IV Continuous <Continuous>  diVALproex  milliGRAM(s) Oral two times a day  docusate sodium 100 milliGRAM(s) Oral two times a day  fentaNYL   Infusion 1 MICROgram(s)/kG/Hr IV Continuous <Continuous>  ferrous    sulfate 325 milliGRAM(s) Oral three times a day with meals  folic acid 1 milliGRAM(s) Oral daily  ipratropium 17 MICROgram(s) HFA Inhaler 1 Puff(s) Inhalation every 6 hours  lidocaine   Patch 1 Patch Transdermal daily  methylPREDNISolone sodium succinate Injectable 20 milliGRAM(s) IV Push every 8 hours  oxyCODONE    IR 5 milliGRAM(s) Oral every 4 hours PRN  pantoprazole  Injectable 40 milliGRAM(s) IV Push daily  phenylephrine    Infusion 0.3 MICROgram(s)/kG/Min IV Continuous <Continuous>  polyethylene glycol 3350 17 Gram(s) Oral at bedtime  sodium chloride 0.9%. 1000 milliLiter(s) IV Continuous <Continuous>      Vital Signs Last 24 Hrs  T(C): 35.8 (16 Nov 2017 04:00), Max: 38.1 (15 Nov 2017 20:00)  T(F): 96.4 (16 Nov 2017 04:00), Max: 100.6 (15 Nov 2017 20:00)  HR: 83 (16 Nov 2017 09:00) (44 - 113)  BP: 156/121 (16 Nov 2017 04:10) (86/68 - 169/117)  BP(mean): 129 (16 Nov 2017 04:10) (69 - 129)  RR: 20 (16 Nov 2017 09:00) (9 - 63)  SpO2: 100% (16 Nov 2017 09:00) (95% - 100%)    PHYSICAL EXAM:  General: [x ] inmtubated  HEAD/EYES: [x ] PERRL [ ] white sclera [ ] icterus  ENT:  [ ] normal x[ ] supple [ ] thrush [ ] pharyngeal exudate  Cardiovascular:   [ ] murmur [x ] normal [ ] PPM/AICD  Respiratory:  [ x] clear to ausculation bilaterally  GI:  [x ] soft, non-tender, normal bowel sounds  :  [ ] mock [ ] no CVA tenderness   Musculoskeletal:  [ ] no synovitis  Neurologic:  [ ] non-focal exam   Skin:  [x ] no rash  Lymph: [ ] no lymphadenopathy  Psychiatric:  [ ] appropriate affect [ ] alert & oriented  Lines:  [x ] no phlebitis [ ] central line                                9.5    15.20 )-----------( 89       ( 16 Nov 2017 02:25 )             29.5       11-16    135  |  92<L>  |  13  ----------------------------<  186<H>  4.9   |  33<H>  |  0.76    Ca    9.1      16 Nov 2017 02:25    TPro  6.4  /  Alb  3.2<L>  /  TBili  0.4  /  DBili  x   /  AST  9   /  ALT  11  /  AlkPhos  65  11-16          MICROBIOLOGY:Culture - Respiratory:   NO GROWTH - PRELIMINARY RESULTS (11-15-17 @ 16:22)  Culture - Respiratory:   NRF^Normal Respiratory Katherine  QUANTITY OF GROWTH: FEW (11-14-17 @ 00:44)      RADIOLOGY:

## 2017-11-16 NOTE — CHART NOTE - NSCHARTNOTEFT_GEN_A_CORE
Source:  nursing & EMR    Diet : NPO after midnight    Patient sedated, intubated , hospitalized since Sept. 10 th and noted wt. loss from admission even with 2+ L & R foot,& ankle edema . Pt. was on Regular diet prior to intubation .      Current Weight: Weight (kg): 87.7 kg on 11/16/17; 92.5 kg on 10/25/17; 92.6 kg  on 10/1/17; Admit wt. - 97.1 kg ; IBW - 78 KG     Pertinent Medications: MEDICATIONS  (STANDING):    ALBUTerol    90 MICROgram(s) HFA Inhaler 2 Puff(s) Inhalation every 6 hours  atorvastatin 80 milliGRAM(s) Oral at bedtime  benzonatate 100 milliGRAM(s) Oral three times a day  buDESOnide 160 MICROgram(s)/formoterol 4.5 MICROgram(s) Inhaler 2 Puff(s) Inhalation two times a day  cisatracurium Infusion 2 MICROgram(s)/kG/Min (9 mL/Hr) IV Continuous <Continuous>  dexmedetomidine Infusion 0.3 MICROgram(s)/kG/Hr (5.625 mL/Hr) IV Continuous <Continuous>  diVALproex  milliGRAM(s) Oral two times a day  docusate sodium 100 milliGRAM(s) Oral two times a day  fentaNYL   Infusion 1 MICROgram(s)/kG/Hr (7.5 mL/Hr) IV Continuous <Continuous>  ferrous    sulfate 325 milliGRAM(s) Oral three times a day with meals  folic acid 1 milliGRAM(s) Oral daily  ipratropium 17 MICROgram(s) HFA Inhaler 1 Puff(s) Inhalation every 6 hours  lidocaine   Patch 1 Patch Transdermal daily  methylPREDNISolone sodium succinate Injectable 40 milliGRAM(s) IV Push every 8 hours  pantoprazole  Injectable 40 milliGRAM(s) IV Push daily  phenylephrine    Infusion 0.3 MICROgram(s)/kG/Min (8.438 mL/Hr) IV Continuous <Continuous>  piperacillin/tazobactam IVPB. 3.375 Gram(s) IV Intermittent every 8 hours  polyethylene glycol 3350 17 Gram(s) Oral at bedtime  vancomycin  IVPB 1000 milliGRAM(s) IV Intermittent every 12 hours  voriconazole IVPB 400 milliGRAM(s) IV Intermittent every 12 hours  voriconazole IVPB 300 milliGRAM(s) IV Intermittent every 12 hours    MEDICATIONS  (PRN):  oxyCODONE    IR 5 milliGRAM(s) Oral every 4 hours PRN Moderate Pain (4 - 6)    Pertinent Labs:  11-16 Na135 mmol/L Glu 186 mg/dL<H> K+ 4.9 mmol/L Cr  0.76 mg/dL BUN 13 mg/dL Phos n/a   Alb 3.2 g/dL<L> PAB n/a         Skin: intact    Estimated Needs:  1950 - 2340 kcal/d ( 25 - 30 kcal/kg ideal wt. ) protein 78 - 117 gm/d ( 1.0 - 1.5 gm/kg ideal wt. )  -  recalculated:       New Nutrition Diagnosis:-  Malnutrition         Related to:  noted wt. loss, edema , prolonged hospitalization - inflammation     Recommend alternate means of nutrition support if pt. remains NPO longer , consult nutrition when nutrition support initiated .    Monitoring and Evaluation:     1. Tolerance to diet prescription   2. weights   3. follow up per protocol

## 2017-11-16 NOTE — PROGRESS NOTE ADULT - PROBLEM SELECTOR PLAN 3
on prednisone.  11/10 continue steroid  11/11 on steroid  11/12 on oral steroid  11/13: cont oral steroids!!  11/14: Has pretty poor lungs secondary to steroids: Cont current therapy!!  11/16: on high dose steroids for now , would need to taper down the steroids to 10 or 20 mg of prednisone chronically:

## 2017-11-16 NOTE — PROGRESS NOTE ADULT - SUBJECTIVE AND OBJECTIVE BOX
OSCAR CHACKO          MRN-[id number]    HPI:  47 year old male with a PMHx of sarcoidosis, HTN, asthma, and COPD presents to the ED with severe leg spasms and headache leading to a presyncopal episode, found to have a significant abnormal EKG and moderate inspiratory and expiratory wheezing in the setting of Entero/Rhino virus, admitted to tele for Near Syncope, r/o ACS and COPD exacerbation.  Hospital course complicated by stroke and PAF- AC on hold due to hemoptysis; 9/21-FB/BAL done by Dr. Arias. Intraop: Bleeding from DAVID (non-lingular)-Recom IR to embolize; 9/22 CT angio done per IR Lobko request.  Formal PFTS and ask Card to fix PFO now to decrease blood flow; 9/24- Developed worsening hemoptysis. Emergently kristel to OR and had  Left thoractomy DAVID. Brought to CTICU.  9/24: 1400ml EBL in OR; 10 u PRBCs;9/25-Brought back to OR for bleeding. Had Reop Thoracotomy, evac left hemothorax. CTI- DAVID space despite -40 Sxn, MEYER, L Hemiparesis (CVA) better w walking; Patient post op with prolongd air leak shila ptx, bedside pleurodesis done.  Developed SIRS after pleurodesis-> CTICU; On pressors. Pt. clinically improved in CTICU. Maintained on Steroids, weaned down to Solumedrol BID. Off pressors and transitioned to Midodrine. Still with thrombocytopenia but no bleeding. Continued on Eliquis but no ASA for h/o CVA. Neuro deficits improving.  Air leak resolved on 10/27. 10/30 Chest tube removed, cxr stable.  Steriods to po taper.  Patient stable for discharge.  11/2-Pt ambulating w PT frequently. Feels well. Pain controlled. All labs and vital signs stable. Wound healing. Arnold po steroid taper. To complete Voriconazole on Nov 4th. CXRs and reports reviewed by and cleared by Dr. Caldera. Pt. to fu for PFO closure as output. Still requires intermittent oxygen. Cleared for discharge by Dr. Caldera to acute rehab.    Patient presented to [facility] after rehab send patient with hemoptysis, patient had 2 episodes of hemoptysis, less than 10cc in total for 24 hours.  Patient states it happens when he coughs, blood tinged sputum.  Patient otherwise doing well in rehab. (09 Nov 2017 00:32)      Procedure:  POD # :     Issues:        Interval/Overnight Events/ ROS  Pt remained hemodynamically stable overnight, not on any pressors or inotropes. OOB to chair, breathing comfortably with minimal pain. Ambulated several times . Denies pain, no SOB, no palpitations, no nausea/ no vomiting, no dizziness  A-line and mock d/carmen         PAST MEDICAL & SURGICAL HISTORY:  History of pneumothorax: History of 3 prior pneumonthoracies.  COPD (chronic obstructive pulmonary disease)  Asthma  Hypertension  Sarcoidosis  History of thoracotomy: 9/24/17 Left thoracotomy, Left upper lobectomy   9/25/17 Reop left thoracotomy, evacuation of left hemothorax    Allergies    No Known Allergies    Intolerances            ***VITAL SIGNS:  Vital Signs Last 24 Hrs  T(C): 35.8 (16 Nov 2017 08:00), Max: 38.1 (15 Nov 2017 20:00)  T(F): 96.5 (16 Nov 2017 08:00), Max: 100.6 (15 Nov 2017 20:00)  HR: 71 (16 Nov 2017 19:00) (44 - 107)  BP: 156/121 (16 Nov 2017 04:10) (86/68 - 169/117)  BP(mean): 129 (16 Nov 2017 04:10) (71 - 129)  RR: 20 (16 Nov 2017 19:00) (14 - 24)  SpO2: 100% (16 Nov 2017 19:00) (95% - 100%)    I/Os:   I&O's Detail    15 Nov 2017 07:01  -  16 Nov 2017 07:00  --------------------------------------------------------  IN:    cisatracurium Infusion: 148.3 mL    dexmedetomidine Infusion: 266.3 mL    fentaNYL  Infusion: 127.5 mL    IV PiggyBack: 1625 mL    Lactated Ringers IV Bolus: 500 mL    lactated ringers.: 75 mL    phenylephrine   Infusion: 140 mL    phenylephrine   Infusion: 142.2 mL    phenylephrine   Infusion: 59.1 mL    sodium chloride 0.9%: 975 mL    sodium chloride 0.9%.: 180 mL  Total IN: 4238.3 mL    OUT:    Indwelling Catheter - Urethral: 755 mL    Voided: 310 mL  Total OUT: 1065 mL    Total NET: 3173.3 mL      16 Nov 2017 07:01  -  16 Nov 2017 19:15  --------------------------------------------------------  IN:    cisatracurium Infusion: 279 mL    dexmedetomidine Infusion: 225 mL    fentaNYL  Infusion: 236.3 mL    phenylephrine   Infusion: 5.6 mL    sodium chloride 0.9%.: 610 mL  Total IN: 1355.9 mL    OUT:    Indwelling Catheter - Urethral: 1190 mL  Total OUT: 1190 mL    Total NET: 165.9 mL          CAPILLARY BLOOD GLUCOSE          =======================  MEDICATIONS  ===================  MEDICATIONS  (STANDING):  ALBUTerol    90 MICROgram(s) HFA Inhaler 2 Puff(s) Inhalation every 6 hours  atorvastatin 80 milliGRAM(s) Oral at bedtime  benzonatate 100 milliGRAM(s) Oral three times a day  buDESOnide 160 MICROgram(s)/formoterol 4.5 MICROgram(s) Inhaler 2 Puff(s) Inhalation two times a day  cisatracurium Infusion 2 MICROgram(s)/kG/Min (9 mL/Hr) IV Continuous <Continuous>  dexmedetomidine Infusion 0.3 MICROgram(s)/kG/Hr (5.625 mL/Hr) IV Continuous <Continuous>  diltiazem Injectable 10 milliGRAM(s) IV Push once  diVALproex  milliGRAM(s) Oral two times a day  docusate sodium 100 milliGRAM(s) Oral two times a day  fentaNYL   Infusion 1 MICROgram(s)/kG/Hr (7.5 mL/Hr) IV Continuous <Continuous>  ferrous    sulfate 325 milliGRAM(s) Oral three times a day with meals  folic acid 1 milliGRAM(s) Oral daily  ipratropium 17 MICROgram(s) HFA Inhaler 1 Puff(s) Inhalation every 6 hours  lidocaine   Patch 1 Patch Transdermal daily  magnesium sulfate  IVPB 2 Gram(s) IV Intermittent once  methylPREDNISolone sodium succinate Injectable 20 milliGRAM(s) IV Push every 8 hours  pantoprazole  Injectable 40 milliGRAM(s) IV Push daily  phenylephrine    Infusion 0.3 MICROgram(s)/kG/Min (8.438 mL/Hr) IV Continuous <Continuous>  piperacillin/tazobactam IVPB. 3.375 Gram(s) IV Intermittent every 8 hours  polyethylene glycol 3350 17 Gram(s) Oral at bedtime  sodium chloride 0.9% Bolus 250 milliLiter(s) IV Bolus once  sodium chloride 0.9%. 1000 milliLiter(s) (30 mL/Hr) IV Continuous <Continuous>  vancomycin  IVPB 1000 milliGRAM(s) IV Intermittent every 12 hours  voriconazole IVPB 300 milliGRAM(s) IV Intermittent every 12 hours    MEDICATIONS  (PRN):  oxyCODONE    IR 5 milliGRAM(s) Oral every 4 hours PRN Moderate Pain (4 - 6)      ======================VENTILATOR SETTINGS  ==============  Mode: AC/ CMV (Assist Control/ Continuous Mandatory Ventilation)  RR (machine): 20  TV (machine): 350  FiO2: 50  PEEP: 5  MAP: 16  PIP: 42      =================== PATIENT CARE ACCESS DEVICES ==========  Peripheral IV  Central Venous Line	R	L	IJ	Fem	SC			Placed:   Arterial Line	R	L	PT	DP	Fem	Rad	Ax	Placed:   Midline:				  Urinary Catheter, Date Placed:   Necessity of urinary, arterial, and venous catheters discussed    ======================= PHYSICAL EXAM===================  General:                         Comfortable, Awake, alert, not in any distress  Neuro:                            Moving all extremities to commands. No focal deficits	  HEENT:                           GIOVANI/ ETT/ NGT/ trach  Respiratory:	Lungs clear on auscultation bilaterally with good aeration.                                           No rales, rhonchi, no wheezing. Effort even and unlabored.  CV:		Regular rate and rhythm. Normal S1/S2. No murmurs  Abdomen:	                     Soft,  nontender, not-distended. Bowel sounds present / absent.   Skin:		No rash.  Extremities:	Warm, no cyanosis or edema.  Palpable pulses    ============================ LABS =======================                        9.1    8.92  )-----------( 73       ( 16 Nov 2017 16:00 )             28.6     11-16    133<L>  |  91<L>  |  16  ----------------------------<  219<H>  5.0   |  30  |  0.71    Ca    8.6      16 Nov 2017 16:00  Phos  6.0     11-16  Mg     1.5     11-16    TPro  6.2  /  Alb  3.1<L>  /  TBili  0.2  /  DBili  x   /  AST  8   /  ALT  7   /  AlkPhos  62  11-16    LIVER FUNCTIONS - ( 16 Nov 2017 16:00 )  Alb: 3.1 g/dL / Pro: 6.2 g/dL / ALK PHOS: 62 u/L / ALT: 7 u/L / AST: 8 u/L / GGT: x           PT/INR - ( 16 Nov 2017 16:00 )   PT: 14.4 SEC;   INR: 1.28          PTT - ( 16 Nov 2017 16:00 )  PTT:26.1 SEC  ABG - ( 16 Nov 2017 16:00 )  pH: 7.37  /  pCO2: 60    /  pO2: 342   / HCO3: 32    / Base Excess: 8.5   /  SaO2: 100.0               BRONCHIAL LAVAGE  11-15-17 --  --  --      BLOOD  11-15-17 --  --  --      SPUTUM  11-14-17 --  --  --      URINE MIDSTREAM  11-13-17 --  --  --      BLOOD PERIPHERAL  11-13-17 --  --  --          ===================== IMAGING STUDIES ===================  Radiology personally reviewed.    ====================ASSESSMENT AND PLAN ================      ====================== NEUROLOGY=======================  Pain control with PCA / PCEA / Tylenol IV / Toradol / Percocet  Pt is on Precedex for agitation  Pt is sedated with Propofol / Fentanyl    ==================== RESPIRATORY========================  Pt is on            L nasal canula / Face tent____% FiO2  Comfortable, no evidence of distress.  Using incentive spirometry & doing                ml  Monitor chest tube output  Chest tube to suction / water seal	    Mechanical Ventilation:  Mode: AC/ CMV (Assist Control/ Continuous Mandatory Ventilation)  RR (machine): 20  TV (machine): 350  FiO2: 50  PEEP: 5  MAP: 16  PIP: 42    Mechanical ventilator status assessed & settings reviewed  Continue bronchodilators, pulmonary toilet  Head of bed elevation to 30-40 degrees    ====================CARDIOVASCULAR=====================  Continue hemodynamic monitoring/ telemetry  Not on any pressors  Continue cardiovascular / antihypertensive medications    ===================== RENAL ============================  Continue LR 30CC/hr      D/C IVF  Monitor I/Os, BUN/ Cr  and electrolytes  D/C Mock      Keep Mock  BPH: Continue Flomax/ Finasteride      ==================== GASTROINTESTINAL===================  On regular diet, tolerating well  Continue GI prophylaxis with Pepcid / Protonix  Continue Zofran / Reglan for nausea - PRN	  NPO    =======================    ENDOCRIN  =====================  Glycemic monitoring  F/S with coverage  ===================HEMATOLOGIC/ONCOLOGIC =============  Monitor chest tube output. No signs of active bleeding.   Follow CBC, coags  in AM  DVT prophylaxis with SCD, sc Heparin    ========================INFECTIOUS DISEASE===============  No signs of infection. Monitor for fever / leukocytosis.  All surgical incision / chest tube  sites look clean  D/C Mock      Pertinent clinical, laboratory, radiographic, hemodynamic, echocardiographic, respiratory data, microbiologic data and chart were reviewed and analyzed frequently throughout the course of the day and night. GI and DVT prophylaxis, glycemic control, head of bed elevation and skin care issues were addressed.  Patient seen, examined and plan discussed with CT Surgery / CTICU team during rounds.         I have spent               minutes of critical care time with this pt between            am/pm    and               am/ pm         minutes spent on total encounter; more than 50% of the visit was spent counseling and/or coordinating care by the attending physician.        KIM Robbins MD OSCAR CHACKO          MRN-[id number]    HPI:  47 year old male with a PMHx of sarcoidosis, HTN, asthma, and COPD presents to the ED with severe leg spasms and headache leading to a presyncopal episode, found to have a significant abnormal EKG and moderate inspiratory and expiratory wheezing in the setting of Entero/Rhino virus, admitted to tele for Near Syncope, r/o ACS and COPD exacerbation.  Hospital course complicated by stroke and PAF- AC on hold due to hemoptysis; 9/21-FB/BAL done by Dr. Arias. Intraop: Bleeding from DAVID (non-lingular)-Recom IR to embolize; 9/22 CT angio done per IR Lobko request.  Formal PFTS and ask Card to fix PFO now to decrease blood flow; 9/24- Developed worsening hemoptysis. Emergently kristel to OR and had  Left thoractomy DAVID. Brought to CTICU.  9/24: 1400ml EBL in OR; 10 u PRBCs;9/25-Brought back to OR for bleeding. Had Reop Thoracotomy, evac left hemothorax. CTI- DAVID space despite -40 Sxn, MEYER, L Hemiparesis (CVA) better w walking; Patient post op with prolongd air leak shila ptx, bedside pleurodesis done.  Developed SIRS after pleurodesis-> CTICU; On pressors. Pt. clinically improved in CTICU. Maintained on Steroids, weaned down to Solumedrol BID. Off pressors and transitioned to Midodrine. Still with thrombocytopenia but no bleeding. Continued on Eliquis but no ASA for h/o CVA. Neuro deficits improving.  Air leak resolved on 10/27. 10/30 Chest tube removed, cxr stable.  Steriods to po taper.  Patient stable for discharge.  11/2-Pt ambulating w PT frequently. Feels well. Pain controlled. All labs and vital signs stable. Wound healing. Arnold po steroid taper. To complete Voriconazole on Nov 4th. CXRs and reports reviewed by and cleared by Dr. Caldera. Pt. to fu for PFO closure as output. Still requires intermittent oxygen. Cleared for discharge by Dr. Caldera to acute rehab.    Patient presented to [facility] after rehab send patient with hemoptysis, patient had 2 episodes of hemoptysis, less than 10cc in total for 24 hours.  Patient states it happens when he coughs, blood tinged sputum.  Patient otherwise doing well in rehab. (09 Nov 2017 00:32)      Procedure:  POD # :     Issues:        Interval/Overnight Events/ ROS  Pt remained hemodynamically stable overnight, not on any pressors or inotropes. OOB to chair, breathing comfortably with minimal pain. Ambulated several times . Denies pain, no SOB, no palpitations, no nausea/ no vomiting, no dizziness  A-line and mock d/carmen         PAST MEDICAL & SURGICAL HISTORY:  History of pneumothorax: History of 3 prior pneumonthoracies.  COPD (chronic obstructive pulmonary disease)  Asthma  Hypertension  Sarcoidosis  History of thoracotomy: 9/24/17 Left thoracotomy, Left upper lobectomy   9/25/17 Reop left thoracotomy, evacuation of left hemothorax    Allergies    No Known Allergies    Intolerances            ***VITAL SIGNS:  Vital Signs Last 24 Hrs  T(C): 35.8 (16 Nov 2017 08:00), Max: 38.1 (15 Nov 2017 20:00)  T(F): 96.5 (16 Nov 2017 08:00), Max: 100.6 (15 Nov 2017 20:00)  HR: 71 (16 Nov 2017 19:00) (44 - 107)  BP: 156/121 (16 Nov 2017 04:10) (86/68 - 169/117)  BP(mean): 129 (16 Nov 2017 04:10) (71 - 129)  RR: 20 (16 Nov 2017 19:00) (14 - 24)  SpO2: 100% (16 Nov 2017 19:00) (95% - 100%)    I/Os:   I&O's Detail    15 Nov 2017 07:01  -  16 Nov 2017 07:00  --------------------------------------------------------  IN:    cisatracurium Infusion: 148.3 mL    dexmedetomidine Infusion: 266.3 mL    fentaNYL  Infusion: 127.5 mL    IV PiggyBack: 1625 mL    Lactated Ringers IV Bolus: 500 mL    lactated ringers.: 75 mL    phenylephrine   Infusion: 140 mL    phenylephrine   Infusion: 142.2 mL    phenylephrine   Infusion: 59.1 mL    sodium chloride 0.9%: 975 mL    sodium chloride 0.9%.: 180 mL  Total IN: 4238.3 mL    OUT:    Indwelling Catheter - Urethral: 755 mL    Voided: 310 mL  Total OUT: 1065 mL    Total NET: 3173.3 mL      16 Nov 2017 07:01  -  16 Nov 2017 19:15  --------------------------------------------------------  IN:    cisatracurium Infusion: 279 mL    dexmedetomidine Infusion: 225 mL    fentaNYL  Infusion: 236.3 mL    phenylephrine   Infusion: 5.6 mL    sodium chloride 0.9%.: 610 mL  Total IN: 1355.9 mL    OUT:    Indwelling Catheter - Urethral: 1190 mL  Total OUT: 1190 mL    Total NET: 165.9 mL          CAPILLARY BLOOD GLUCOSE          =======================  MEDICATIONS  ===================  MEDICATIONS  (STANDING):  ALBUTerol    90 MICROgram(s) HFA Inhaler 2 Puff(s) Inhalation every 6 hours  atorvastatin 80 milliGRAM(s) Oral at bedtime  benzonatate 100 milliGRAM(s) Oral three times a day  buDESOnide 160 MICROgram(s)/formoterol 4.5 MICROgram(s) Inhaler 2 Puff(s) Inhalation two times a day  cisatracurium Infusion 2 MICROgram(s)/kG/Min (9 mL/Hr) IV Continuous <Continuous>  dexmedetomidine Infusion 0.3 MICROgram(s)/kG/Hr (5.625 mL/Hr) IV Continuous <Continuous>  diltiazem Injectable 10 milliGRAM(s) IV Push once  diVALproex  milliGRAM(s) Oral two times a day  docusate sodium 100 milliGRAM(s) Oral two times a day  fentaNYL   Infusion 1 MICROgram(s)/kG/Hr (7.5 mL/Hr) IV Continuous <Continuous>  ferrous    sulfate 325 milliGRAM(s) Oral three times a day with meals  folic acid 1 milliGRAM(s) Oral daily  ipratropium 17 MICROgram(s) HFA Inhaler 1 Puff(s) Inhalation every 6 hours  lidocaine   Patch 1 Patch Transdermal daily  magnesium sulfate  IVPB 2 Gram(s) IV Intermittent once  methylPREDNISolone sodium succinate Injectable 20 milliGRAM(s) IV Push every 8 hours  pantoprazole  Injectable 40 milliGRAM(s) IV Push daily  phenylephrine    Infusion 0.3 MICROgram(s)/kG/Min (8.438 mL/Hr) IV Continuous <Continuous>  piperacillin/tazobactam IVPB. 3.375 Gram(s) IV Intermittent every 8 hours  polyethylene glycol 3350 17 Gram(s) Oral at bedtime  sodium chloride 0.9% Bolus 250 milliLiter(s) IV Bolus once  sodium chloride 0.9%. 1000 milliLiter(s) (30 mL/Hr) IV Continuous <Continuous>  vancomycin  IVPB 1000 milliGRAM(s) IV Intermittent every 12 hours  voriconazole IVPB 300 milliGRAM(s) IV Intermittent every 12 hours    MEDICATIONS  (PRN):  oxyCODONE    IR 5 milliGRAM(s) Oral every 4 hours PRN Moderate Pain (4 - 6)      ======================VENTILATOR SETTINGS  ==============  Mode: AC/ CMV (Assist Control/ Continuous Mandatory Ventilation)  RR (machine): 20  TV (machine): 350  FiO2: 50  PEEP: 5  MAP: 16  PIP: 42      =================== PATIENT CARE ACCESS DEVICES ==========  Peripheral IV  Central Venous Line	R	L	IJ	Fem	SC			Placed:   Arterial Line	R	L	PT	DP	Fem	Rad	Ax	Placed:   Midline:				  Urinary Catheter, Date Placed:   Necessity of urinary, arterial, and venous catheters discussed    ======================= PHYSICAL EXAM===================  General:                         Comfortable, Awake, alert, not in any distress  Neuro:                            Moving all extremities to commands. No focal deficits	  HEENT:                           GIOVANI/ ETT/ NGT/ trach  Respiratory:	Lungs clear on auscultation bilaterally with good aeration.                                           No rales, rhonchi, no wheezing. Effort even and unlabored.  CV:		Regular rate and rhythm. Normal S1/S2. No murmurs  Abdomen:	                     Soft,  nontender, not-distended. Bowel sounds present / absent.   Skin:		No rash.  Extremities:	Warm, no cyanosis or edema.  Palpable pulses    ============================ LABS =======================                        9.1    8.92  )-----------( 73       ( 16 Nov 2017 16:00 )             28.6     11-16    133<L>  |  91<L>  |  16  ----------------------------<  219<H>  5.0   |  30  |  0.71    Ca    8.6      16 Nov 2017 16:00  Phos  6.0     11-16  Mg     1.5     11-16    TPro  6.2  /  Alb  3.1<L>  /  TBili  0.2  /  DBili  x   /  AST  8   /  ALT  7   /  AlkPhos  62  11-16    LIVER FUNCTIONS - ( 16 Nov 2017 16:00 )  Alb: 3.1 g/dL / Pro: 6.2 g/dL / ALK PHOS: 62 u/L / ALT: 7 u/L / AST: 8 u/L / GGT: x           PT/INR - ( 16 Nov 2017 16:00 )   PT: 14.4 SEC;   INR: 1.28          PTT - ( 16 Nov 2017 16:00 )  PTT:26.1 SEC  ABG - ( 16 Nov 2017 16:00 )  pH: 7.37  /  pCO2: 60    /  pO2: 342   / HCO3: 32    / Base Excess: 8.5   /  SaO2: 100.0               BRONCHIAL LAVAGE  11-15-17 --  --  --      BLOOD  11-15-17 --  --  --      SPUTUM  11-14-17 --  --  --      URINE MIDSTREAM  11-13-17 --  --  --      BLOOD PERIPHERAL  11-13-17 --  --  --          ===================== IMAGING STUDIES ===================  Radiology personally reviewed.    ====================ASSESSMENT AND PLAN ================      ====================== NEUROLOGY=======================  Pain control with PCA / PCEA / Tylenol IV / Toradol / Percocet  Pt is on Precedex for agitation  Pt is sedated with Propofol / Fentanyl    ==================== RESPIRATORY========================  Pt is on            L nasal canula / Face tent____% FiO2  Comfortable, no evidence of distress.  Using incentive spirometry & doing                ml  Monitor chest tube output  Chest tube to suction / water seal	    Mechanical Ventilation:  Mode: AC/ CMV (Assist Control/ Continuous Mandatory Ventilation)  RR (machine): 20  TV (machine): 350  FiO2: 50  PEEP: 5  MAP: 16  PIP: 42    Mechanical ventilator status assessed & settings reviewed  Continue bronchodilators, pulmonary toilet  Head of bed elevation to 30-40 degrees    ====================CARDIOVASCULAR=====================  Continue hemodynamic monitoring/ telemetry  Not on any pressors  Continue cardiovascular / antihypertensive medications    ===================== RENAL ============================  Continue LR 30CC/hr      D/C IVF  Monitor I/Os, BUN/ Cr  and electrolytes  D/C Mock      Keep Mock  BPH: Continue Flomax/ Finasteride      ==================== GASTROINTESTINAL===================  On regular diet, tolerating well  Continue GI prophylaxis with Pepcid / Protonix  Continue Zofran / Reglan for nausea - PRN	  NPO    =======================    ENDOCRIN  =====================  Glycemic monitoring  F/S with coverage  ===================HEMATOLOGIC/ONCOLOGIC =============  Monitor chest tube output. No signs of active bleeding.   Follow CBC, coags  in AM  DVT prophylaxis with SCD, sc Heparin    ========================INFECTIOUS DISEASE===============  No signs of infection. Monitor for fever / leukocytosis.  All surgical incision / chest tube  sites look clean  D/C Mock      Pertinent clinical, laboratory, radiographic, hemodynamic, echocardiographic, respiratory data, microbiologic data and chart were reviewed and analyzed frequently throughout the course of the day and night. GI and DVT prophylaxis, glycemic control, head of bed elevation and skin care issues were addressed.  Patient seen, examined and plan discussed with CT Surgery / CTICU team during rounds.         I have spent               minutes of critical care time with this pt between            am/pm    and               am/ pm         minutes spent on total encounter; more than 50% of the visit was spent counseling and/or coordinating care by the attending physician.        KIM Robbins MD OSCAR CHACKO          MRN-[id number]    HPI  47 M PMH of sarcoidosis, HTN, COPD, and asthma who was initially admitted on 09/10 w cc of severe leg spasms X two days  In E.D. pt found to be wheezing, seen by Pulmonary and started on IV Steroids for sarcoidosis  On 9/12  Stroke codes were called as the patient became unresponsive.  He was transferred to MICU and then intubated for airway protection.   The patient was found to have a new infarct on MRI and also found to have a patent PFO on this admission as well.     There was concern patient was having paroxysmal afib, so he was started on Eliquis. He has received 2 doses of Eliquis on 09/19. complicated by hemoptysis;  underwent Bronchoscopy with bronchoalveolar lavage of both sides and epi injection on 09/21 and was to have received further IR embolization for hemoptysis control in the next few days.  However, he developed hemoptysis again and underwent urgent DAVID lobectomy, complicated with post op bleeding.    Left VATS and DAVID lobectomy 9/24/17    Re-op L thoracotomy, evacuation of hemothorax (600cc clot, 400cc blood) no obvious site of bleeding  9/25/17  Re-op L thoracotomy, evacuation of hemothorax; RTOR x2 100cc clot; 200cc blood: bleeding posterior intercostal arteries identified and cauterized 9/25/17   Pos  10/23/17 bedside talc pleurodesis  10/25/17  Pt was brought to ICU for acute SOB / Hypotension / Tachycardia / acute renal failure.     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  Hospital course complicated by stroke and PAF- AC on hold due to hemoptysis; 9/21-FB/BAL done by Dr. Arias. Intraop: Bleeding from DAVID (non-lingular)-Recom IR to embolize; 9/22 CT angio done per IR Lobko request.  Formal PFTS and ask Card to fix PFO now to decrease blood flow; 9/24- Developed worsening hemoptysis. Emergently kristel to OR and had  Left thoractomy DAVID. Brought to CTICU.  9/24: 1400ml EBL in OR; 10 u PRBCs;9/25-Brought back to OR for bleeding. Had Reop Thoracotomy, evac left hemothorax. CTI- DAVID space despite -40 Sxn, MEYER, L Hemiparesis (CVA) better w walking; Patient post op with prolongd air leak shila ptx, bedside pleurodesis done.  Developed SIRS after pleurodesis-> CTICU; On pressors. Pt. clinically improved in CTICU. Maintained on Steroids, weaned down to Solumedrol BID. Off pressors and transitioned to Midodrine. Still with thrombocytopenia but no bleeding. Continued on Eliquis but no ASA for h/o CVA. Neuro deficits improving.  Air leak resolved on 10/27. 10/30 Chest tube removed, cxr stable.  Steriods to po taper.  Patient stable for discharge.  11/2-Pt ambulating w PT frequently. Feels well. Pain controlled. All labs and vital signs stable. Wound healing. Arnold po steroid taper. To complete Voriconazole on Nov 4th. CXRs and reports reviewed by and cleared by Dr. Caldera. Pt. to fu for PFO closure as output. Still requires intermittent oxygen. Cleared for discharge by Dr. Caldera to acute rehab.    Patient presented to [place] [facility] after rehab send patient with hemoptysis, patient had 2 episodes of hemoptysis, less than 10cc in total for 24 hours.  Patient states it happens when he coughs, blood tinged sputum.  Patient otherwise doing well in rehab. (09 Nov 2017 00:32)      Procedure:  POD # :     Issues:        Interval/Overnight Events/ ROS  Pt remained hemodynamically stable overnight, not on any pressors or inotropes. OOB to chair, breathing comfortably with minimal pain. Ambulated several times . Denies pain, no SOB, no palpitations, no nausea/ no vomiting, no dizziness  A-line and mock d/carmen         PAST MEDICAL & SURGICAL HISTORY:  History of pneumothorax: History of 3 prior pneumonthoracies.  COPD (chronic obstructive pulmonary disease)  Asthma  Hypertension  Sarcoidosis  History of thoracotomy: 9/24/17 Left thoracotomy, Left upper lobectomy   9/25/17 Reop left thoracotomy, evacuation of left hemothorax    Allergies    No Known Allergies    Intolerances            ***VITAL SIGNS:  Vital Signs Last 24 Hrs  T(C): 35.8 (16 Nov 2017 08:00), Max: 38.1 (15 Nov 2017 20:00)  T(F): 96.5 (16 Nov 2017 08:00), Max: 100.6 (15 Nov 2017 20:00)  HR: 71 (16 Nov 2017 19:00) (44 - 107)  BP: 156/121 (16 Nov 2017 04:10) (86/68 - 169/117)  BP(mean): 129 (16 Nov 2017 04:10) (71 - 129)  RR: 20 (16 Nov 2017 19:00) (14 - 24)  SpO2: 100% (16 Nov 2017 19:00) (95% - 100%)    I/Os:   I&O's Detail    15 Nov 2017 07:01  -  16 Nov 2017 07:00  --------------------------------------------------------  IN:    cisatracurium Infusion: 148.3 mL    dexmedetomidine Infusion: 266.3 mL    fentaNYL  Infusion: 127.5 mL    IV PiggyBack: 1625 mL    Lactated Ringers IV Bolus: 500 mL    lactated ringers.: 75 mL    phenylephrine   Infusion: 140 mL    phenylephrine   Infusion: 142.2 mL    phenylephrine   Infusion: 59.1 mL    sodium chloride 0.9%: 975 mL    sodium chloride 0.9%.: 180 mL  Total IN: 4238.3 mL    OUT:    Indwelling Catheter - Urethral: 755 mL    Voided: 310 mL  Total OUT: 1065 mL    Total NET: 3173.3 mL      16 Nov 2017 07:01  -  16 Nov 2017 19:15  --------------------------------------------------------  IN:    cisatracurium Infusion: 279 mL    dexmedetomidine Infusion: 225 mL    fentaNYL  Infusion: 236.3 mL    phenylephrine   Infusion: 5.6 mL    sodium chloride 0.9%.: 610 mL  Total IN: 1355.9 mL    OUT:    Indwelling Catheter - Urethral: 1190 mL  Total OUT: 1190 mL    Total NET: 165.9 mL          CAPILLARY BLOOD GLUCOSE          =======================  MEDICATIONS  ===================  MEDICATIONS  (STANDING):  ALBUTerol    90 MICROgram(s) HFA Inhaler 2 Puff(s) Inhalation every 6 hours  atorvastatin 80 milliGRAM(s) Oral at bedtime  benzonatate 100 milliGRAM(s) Oral three times a day  buDESOnide 160 MICROgram(s)/formoterol 4.5 MICROgram(s) Inhaler 2 Puff(s) Inhalation two times a day  cisatracurium Infusion 2 MICROgram(s)/kG/Min (9 mL/Hr) IV Continuous <Continuous>  dexmedetomidine Infusion 0.3 MICROgram(s)/kG/Hr (5.625 mL/Hr) IV Continuous <Continuous>  diltiazem Injectable 10 milliGRAM(s) IV Push once  diVALproex  milliGRAM(s) Oral two times a day  docusate sodium 100 milliGRAM(s) Oral two times a day  fentaNYL   Infusion 1 MICROgram(s)/kG/Hr (7.5 mL/Hr) IV Continuous <Continuous>  ferrous    sulfate 325 milliGRAM(s) Oral three times a day with meals  folic acid 1 milliGRAM(s) Oral daily  ipratropium 17 MICROgram(s) HFA Inhaler 1 Puff(s) Inhalation every 6 hours  lidocaine   Patch 1 Patch Transdermal daily  magnesium sulfate  IVPB 2 Gram(s) IV Intermittent once  methylPREDNISolone sodium succinate Injectable 20 milliGRAM(s) IV Push every 8 hours  pantoprazole  Injectable 40 milliGRAM(s) IV Push daily  phenylephrine    Infusion 0.3 MICROgram(s)/kG/Min (8.438 mL/Hr) IV Continuous <Continuous>  piperacillin/tazobactam IVPB. 3.375 Gram(s) IV Intermittent every 8 hours  polyethylene glycol 3350 17 Gram(s) Oral at bedtime  sodium chloride 0.9% Bolus 250 milliLiter(s) IV Bolus once  sodium chloride 0.9%. 1000 milliLiter(s) (30 mL/Hr) IV Continuous <Continuous>  vancomycin  IVPB 1000 milliGRAM(s) IV Intermittent every 12 hours  voriconazole IVPB 300 milliGRAM(s) IV Intermittent every 12 hours    MEDICATIONS  (PRN):  oxyCODONE    IR 5 milliGRAM(s) Oral every 4 hours PRN Moderate Pain (4 - 6)      ======================VENTILATOR SETTINGS  ==============  Mode: AC/ CMV (Assist Control/ Continuous Mandatory Ventilation)  RR (machine): 20  TV (machine): 350  FiO2: 50  PEEP: 5  MAP: 16  PIP: 42      =================== PATIENT CARE ACCESS DEVICES ==========  Peripheral IV  Central Venous Line	R	L	IJ	Fem	SC			Placed:   Arterial Line	R	L	PT	DP	Fem	Rad	Ax	Placed:   Midline:				  Urinary Catheter, Date Placed:   Necessity of urinary, arterial, and venous catheters discussed    ======================= PHYSICAL EXAM===================  General:                         Comfortable, Awake, alert, not in any distress  Neuro:                            Moving all extremities to commands. No focal deficits	  HEENT:                           GIOVANI/ ETT/ NGT/ trach  Respiratory:	Lungs clear on auscultation bilaterally with good aeration.                                           No rales, rhonchi, no wheezing. Effort even and unlabored.  CV:		Regular rate and rhythm. Normal S1/S2. No murmurs  Abdomen:	                     Soft,  nontender, not-distended. Bowel sounds present / absent.   Skin:		No rash.  Extremities:	Warm, no cyanosis or edema.  Palpable pulses    ============================ LABS =======================                        9.1    8.92  )-----------( 73       ( 16 Nov 2017 16:00 )             28.6     11-16    133<L>  |  91<L>  |  16  ----------------------------<  219<H>  5.0   |  30  |  0.71    Ca    8.6      16 Nov 2017 16:00  Phos  6.0     11-16  Mg     1.5     11-16    TPro  6.2  /  Alb  3.1<L>  /  TBili  0.2  /  DBili  x   /  AST  8   /  ALT  7   /  AlkPhos  62  11-16    LIVER FUNCTIONS - ( 16 Nov 2017 16:00 )  Alb: 3.1 g/dL / Pro: 6.2 g/dL / ALK PHOS: 62 u/L / ALT: 7 u/L / AST: 8 u/L / GGT: x           PT/INR - ( 16 Nov 2017 16:00 )   PT: 14.4 SEC;   INR: 1.28          PTT - ( 16 Nov 2017 16:00 )  PTT:26.1 SEC  ABG - ( 16 Nov 2017 16:00 )  pH: 7.37  /  pCO2: 60    /  pO2: 342   / HCO3: 32    / Base Excess: 8.5   /  SaO2: 100.0               BRONCHIAL LAVAGE  11-15-17 --  --  --      BLOOD  11-15-17 --  --  --      SPUTUM  11-14-17 --  --  --      URINE MIDSTREAM  11-13-17 --  --  --      BLOOD PERIPHERAL  11-13-17 --  --  --          ===================== IMAGING STUDIES ===================  Radiology personally reviewed.    ====================ASSESSMENT AND PLAN ================      ====================== NEUROLOGY=======================  Pain control with PCA / PCEA / Tylenol IV / Toradol / Percocet  Pt is on Precedex for agitation  Pt is sedated with Propofol / Fentanyl    ==================== RESPIRATORY========================  Pt is on            L nasal canula / Face tent____% FiO2  Comfortable, no evidence of distress.  Using incentive spirometry & doing                ml  Monitor chest tube output  Chest tube to suction / water seal	    Mechanical Ventilation:  Mode: AC/ CMV (Assist Control/ Continuous Mandatory Ventilation)  RR (machine): 20  TV (machine): 350  FiO2: 50  PEEP: 5  MAP: 16  PIP: 42    Mechanical ventilator status assessed & settings reviewed  Continue bronchodilators, pulmonary toilet  Head of bed elevation to 30-40 degrees    ====================CARDIOVASCULAR=====================  Continue hemodynamic monitoring/ telemetry  Not on any pressors  Continue cardiovascular / antihypertensive medications    ===================== RENAL ============================  Continue LR 30CC/hr      D/C IVF  Monitor I/Os, BUN/ Cr  and electrolytes  D/C Mock      Keep Mock  BPH: Continue Flomax/ Finasteride      ==================== GASTROINTESTINAL===================  On regular diet, tolerating well  Continue GI prophylaxis with Pepcid / Protonix  Continue Zofran / Reglan for nausea - PRN	  NPO    =======================    ENDOCRIN  =====================  Glycemic monitoring  F/S with coverage  ===================HEMATOLOGIC/ONCOLOGIC =============  Monitor chest tube output. No signs of active bleeding.   Follow CBC, coags  in AM  DVT prophylaxis with SCD, sc Heparin    ========================INFECTIOUS DISEASE===============  No signs of infection. Monitor for fever / leukocytosis.  All surgical incision / chest tube  sites look clean  D/C Mock      Pertinent clinical, laboratory, radiographic, hemodynamic, echocardiographic, respiratory data, microbiologic data and chart were reviewed and analyzed frequently throughout the course of the day and night. GI and DVT prophylaxis, glycemic control, head of bed elevation and skin care issues were addressed.  Patient seen, examined and plan discussed with CT Surgery / CTICU team during rounds.         I have spent               minutes of critical care time with this pt between            am/pm    and               am/ pm         minutes spent on total encounter; more than 50% of the visit was spent counseling and/or coordinating care by the attending physician.        KIM Robbins MD OSCAR CHACKO          MRN-[id number]    HPI  47 M PMH of sarcoidosis, HTN, COPD, and asthma who was initially admitted on 09/10 w cc of severe leg spasms X two days  In E.D. pt found to be wheezing, seen by Pulmonary and started on IV Steroids for sarcoidosis  On 9/12  Stroke codes were called as the patient became unresponsive.  He was transferred to MICU and then intubated for airway protection.   The patient was found to have a new infarct on MRI and also found to have a patent PFO .    There was concern patient was having paroxysmal afib-  was started on Eliquis. He has received 2 doses of Eliquis on 09/19. complicated by hemoptysis;  underwent Bronchoscopy with bronchoalveolar lavage with epi injection on 09/21 and was planned  to have  IR embolization for hemoptysis control in the next few days.  However, he developed hemoptysis again and underwent urgent DAVID lobectomy, complicated with post op bleeding.    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  Hospital course complicated by stroke and PAF- AC on hold due to hemoptysis; 9/21-FB/BAL done by Dr. Arias. Intraop: Bleeding from DAVID (non-lingular)-Recom IR to embolize; 9/22 CT angio done per IR Lobko request.  Formal PFTS and ask Card to fix PFO now to decrease blood flow; 9/24- Developed worsening hemoptysis. Emergently kristel to OR and had  Left thoractomy DAVID. Brought to CTICU.  9/24: 1400ml EBL in OR; 10 u PRBCs;9/25-Brought back to OR for bleeding. Had Reop Thoracotomy, evac left hemothorax. CTI- DAVID space despite -40 Sxn, MEYER, L Hemiparesis (CVA) better w walking; Patient post op with prolongd air leak shila ptx, bedside pleurodesis done.  Developed SIRS after pleurodesis-> CTICU; On pressors. Pt. clinically improved in CTICU. Maintained on Steroids, weaned down to Solumedrol BID. Off pressors and transitioned to Midodrine. Still with thrombocytopenia but no bleeding. Continued on Eliquis but no ASA for h/o CVA. Neuro deficits improving.  Air leak resolved on 10/27. 10/30 Chest tube removed, cxr stable.  Steriods to po taper.  Patient stable for discharge.  11/2-Pt ambulating w PT frequently. Feels well. Pain controlled. All labs and vital signs stable. Wound healing. Arnold po steroid taper. To complete Voriconazole on Nov 4th. CXRs and reports reviewed by and cleared by Dr. Caldera. Pt. to fu for PFO closure as output. Still requires intermittent oxygen. Cleared for discharge by Dr. Caldera to acute rehab.    Patient presented to [place] ER after rehab send patient with hemoptysis, patient had 2 episodes of hemoptysis, less than 10cc in total for 24 hours.  Patient states it happens when he coughs, blood tinged sputum.  Patient otherwise doing well in rehab. (09 Nov 2017 00:32)      Procedure:  POD # :     Issues:        Interval/Overnight Events/ ROS  Pt remained hemodynamically stable overnight, not on any pressors or inotropes. OOB to chair, breathing comfortably with minimal pain. Ambulated several times . Denies pain, no SOB, no palpitations, no nausea/ no vomiting, no dizziness  A-line and mock d/carmen         PAST MEDICAL & SURGICAL HISTORY:  History of pneumothorax: History of 3 prior pneumonthoracies.  COPD (chronic obstructive pulmonary disease)  Asthma  Hypertension  Sarcoidosis  History of thoracotomy: 9/24/17 Left thoracotomy, Left upper lobectomy   9/25/17 Reop left thoracotomy, evacuation of left hemothorax    Allergies    No Known Allergies    Intolerances            ***VITAL SIGNS:  Vital Signs Last 24 Hrs  T(C): 35.8 (16 Nov 2017 08:00), Max: 38.1 (15 Nov 2017 20:00)  T(F): 96.5 (16 Nov 2017 08:00), Max: 100.6 (15 Nov 2017 20:00)  HR: 71 (16 Nov 2017 19:00) (44 - 107)  BP: 156/121 (16 Nov 2017 04:10) (86/68 - 169/117)  BP(mean): 129 (16 Nov 2017 04:10) (71 - 129)  RR: 20 (16 Nov 2017 19:00) (14 - 24)  SpO2: 100% (16 Nov 2017 19:00) (95% - 100%)    I/Os:   I&O's Detail    15 Nov 2017 07:01  -  16 Nov 2017 07:00  --------------------------------------------------------  IN:    cisatracurium Infusion: 148.3 mL    dexmedetomidine Infusion: 266.3 mL    fentaNYL  Infusion: 127.5 mL    IV PiggyBack: 1625 mL    Lactated Ringers IV Bolus: 500 mL    lactated ringers.: 75 mL    phenylephrine   Infusion: 140 mL    phenylephrine   Infusion: 142.2 mL    phenylephrine   Infusion: 59.1 mL    sodium chloride 0.9%: 975 mL    sodium chloride 0.9%.: 180 mL  Total IN: 4238.3 mL    OUT:    Indwelling Catheter - Urethral: 755 mL    Voided: 310 mL  Total OUT: 1065 mL    Total NET: 3173.3 mL      16 Nov 2017 07:01  -  16 Nov 2017 19:15  --------------------------------------------------------  IN:    cisatracurium Infusion: 279 mL    dexmedetomidine Infusion: 225 mL    fentaNYL  Infusion: 236.3 mL    phenylephrine   Infusion: 5.6 mL    sodium chloride 0.9%.: 610 mL  Total IN: 1355.9 mL    OUT:    Indwelling Catheter - Urethral: 1190 mL  Total OUT: 1190 mL    Total NET: 165.9 mL          CAPILLARY BLOOD GLUCOSE          =======================  MEDICATIONS  ===================  MEDICATIONS  (STANDING):  ALBUTerol    90 MICROgram(s) HFA Inhaler 2 Puff(s) Inhalation every 6 hours  atorvastatin 80 milliGRAM(s) Oral at bedtime  benzonatate 100 milliGRAM(s) Oral three times a day  buDESOnide 160 MICROgram(s)/formoterol 4.5 MICROgram(s) Inhaler 2 Puff(s) Inhalation two times a day  cisatracurium Infusion 2 MICROgram(s)/kG/Min (9 mL/Hr) IV Continuous <Continuous>  dexmedetomidine Infusion 0.3 MICROgram(s)/kG/Hr (5.625 mL/Hr) IV Continuous <Continuous>  diltiazem Injectable 10 milliGRAM(s) IV Push once  diVALproex  milliGRAM(s) Oral two times a day  docusate sodium 100 milliGRAM(s) Oral two times a day  fentaNYL   Infusion 1 MICROgram(s)/kG/Hr (7.5 mL/Hr) IV Continuous <Continuous>  ferrous    sulfate 325 milliGRAM(s) Oral three times a day with meals  folic acid 1 milliGRAM(s) Oral daily  ipratropium 17 MICROgram(s) HFA Inhaler 1 Puff(s) Inhalation every 6 hours  lidocaine   Patch 1 Patch Transdermal daily  magnesium sulfate  IVPB 2 Gram(s) IV Intermittent once  methylPREDNISolone sodium succinate Injectable 20 milliGRAM(s) IV Push every 8 hours  pantoprazole  Injectable 40 milliGRAM(s) IV Push daily  phenylephrine    Infusion 0.3 MICROgram(s)/kG/Min (8.438 mL/Hr) IV Continuous <Continuous>  piperacillin/tazobactam IVPB. 3.375 Gram(s) IV Intermittent every 8 hours  polyethylene glycol 3350 17 Gram(s) Oral at bedtime  sodium chloride 0.9% Bolus 250 milliLiter(s) IV Bolus once  sodium chloride 0.9%. 1000 milliLiter(s) (30 mL/Hr) IV Continuous <Continuous>  vancomycin  IVPB 1000 milliGRAM(s) IV Intermittent every 12 hours  voriconazole IVPB 300 milliGRAM(s) IV Intermittent every 12 hours    MEDICATIONS  (PRN):  oxyCODONE    IR 5 milliGRAM(s) Oral every 4 hours PRN Moderate Pain (4 - 6)      ======================VENTILATOR SETTINGS  ==============  Mode: AC/ CMV (Assist Control/ Continuous Mandatory Ventilation)  RR (machine): 20  TV (machine): 350  FiO2: 50  PEEP: 5  MAP: 16  PIP: 42      =================== PATIENT CARE ACCESS DEVICES ==========  Peripheral IV  Central Venous Line	R	L	IJ	Fem	SC			Placed:   Arterial Line	R	L	PT	DP	Fem	Rad	Ax	Placed:   Midline:				  Urinary Catheter, Date Placed:   Necessity of urinary, arterial, and venous catheters discussed    ======================= PHYSICAL EXAM===================  General:                         Comfortable, Awake, alert, not in any distress  Neuro:                            Moving all extremities to commands. No focal deficits	  HEENT:                           GIOVANI/ ETT/ NGT/ trach  Respiratory:	Lungs clear on auscultation bilaterally with good aeration.                                           No rales, rhonchi, no wheezing. Effort even and unlabored.  CV:		Regular rate and rhythm. Normal S1/S2. No murmurs  Abdomen:	                     Soft,  nontender, not-distended. Bowel sounds present / absent.   Skin:		No rash.  Extremities:	Warm, no cyanosis or edema.  Palpable pulses    ============================ LABS =======================                        9.1    8.92  )-----------( 73       ( 16 Nov 2017 16:00 )             28.6     11-16    133<L>  |  91<L>  |  16  ----------------------------<  219<H>  5.0   |  30  |  0.71    Ca    8.6      16 Nov 2017 16:00  Phos  6.0     11-16  Mg     1.5     11-16    TPro  6.2  /  Alb  3.1<L>  /  TBili  0.2  /  DBili  x   /  AST  8   /  ALT  7   /  AlkPhos  62  11-16    LIVER FUNCTIONS - ( 16 Nov 2017 16:00 )  Alb: 3.1 g/dL / Pro: 6.2 g/dL / ALK PHOS: 62 u/L / ALT: 7 u/L / AST: 8 u/L / GGT: x           PT/INR - ( 16 Nov 2017 16:00 )   PT: 14.4 SEC;   INR: 1.28          PTT - ( 16 Nov 2017 16:00 )  PTT:26.1 SEC  ABG - ( 16 Nov 2017 16:00 )  pH: 7.37  /  pCO2: 60    /  pO2: 342   / HCO3: 32    / Base Excess: 8.5   /  SaO2: 100.0               BRONCHIAL LAVAGE  11-15-17 --  --  --      BLOOD  11-15-17 --  --  --      SPUTUM  11-14-17 --  --  --      URINE MIDSTREAM  11-13-17 --  --  --      BLOOD PERIPHERAL  11-13-17 --  --  --          ===================== IMAGING STUDIES ===================  Radiology personally reviewed.    ====================ASSESSMENT AND PLAN ================      ====================== NEUROLOGY=======================  Pain control with PCA / PCEA / Tylenol IV / Toradol / Percocet  Pt is on Precedex for agitation  Pt is sedated with Propofol / Fentanyl    ==================== RESPIRATORY========================  Pt is on            L nasal canula / Face tent____% FiO2  Comfortable, no evidence of distress.  Using incentive spirometry & doing                ml  Monitor chest tube output  Chest tube to suction / water seal	    Mechanical Ventilation:  Mode: AC/ CMV (Assist Control/ Continuous Mandatory Ventilation)  RR (machine): 20  TV (machine): 350  FiO2: 50  PEEP: 5  MAP: 16  PIP: 42    Mechanical ventilator status assessed & settings reviewed  Continue bronchodilators, pulmonary toilet  Head of bed elevation to 30-40 degrees    ====================CARDIOVASCULAR=====================  Continue hemodynamic monitoring/ telemetry  Not on any pressors  Continue cardiovascular / antihypertensive medications    ===================== RENAL ============================  Continue LR 30CC/hr      D/C IVF  Monitor I/Os, BUN/ Cr  and electrolytes  D/C Mock      Keep Mock  BPH: Continue Flomax/ Finasteride      ==================== GASTROINTESTINAL===================  On regular diet, tolerating well  Continue GI prophylaxis with Pepcid / Protonix  Continue Zofran / Reglan for nausea - PRN	  NPO    =======================    ENDOCRIN  =====================  Glycemic monitoring  F/S with coverage  ===================HEMATOLOGIC/ONCOLOGIC =============  Monitor chest tube output. No signs of active bleeding.   Follow CBC, coags  in AM  DVT prophylaxis with SCD, sc Heparin    ========================INFECTIOUS DISEASE===============  No signs of infection. Monitor for fever / leukocytosis.  All surgical incision / chest tube  sites look clean  D/C Mock      Pertinent clinical, laboratory, radiographic, hemodynamic, echocardiographic, respiratory data, microbiologic data and chart were reviewed and analyzed frequently throughout the course of the day and night. GI and DVT prophylaxis, glycemic control, head of bed elevation and skin care issues were addressed.  Patient seen, examined and plan discussed with CT Surgery / CTICU team during rounds.         I have spent               minutes of critical care time with this pt between            am/pm    and               am/ pm         minutes spent on total encounter; more than 50% of the visit was spent counseling and/or coordinating care by the attending physician.        KIM Robbins MD OSCAR CHACKO          MRN-[id number]    HPI  47 M PMH of sarcoidosis, HTN, COPD, and asthma who was initially admitted on 09/10 w cc of severe leg spasms X two days  In E.D. pt found to be wheezing, seen by Pulmonary and started on IV Steroids for sarcoidosis  On 9/12  Stroke codes were called as the patient became unresponsive.  He was transferred to MICU and then intubated for airway protection.   The patient was found to have a new infarct on MRI and also found to have a patent PFO .    There was concern patient was having paroxysmal afib-  was started on Eliquis. He has received 2 doses of Eliquis on 09/19. complicated by hemoptysis;  underwent Bronchoscopy with bronchoalveolar lavage with epi injection on 09/21 and was planned  to have  IR embolization for hemoptysis control in the next few days.  However, he developed hemoptysis again and underwent urgent DAVID lobectomy, complicated with post op bleeding.    Left VATS and DAVID lobectomy 9/24/17    Re-op L thoracotomy, evacuation of hemothorax (600cc clot, 400cc blood) no obvious site of bleeding  9/25/17  Re-op L thoracotomy, evacuation of hemothorax; RTOR x2 100cc clot; 200cc blood: bleeding posterior intercostal arteries identified and cauterized 9/25/17   10/23/17 bedside talc pleurodesis  Pt eventually discharged to rehab - readmitted on 11/09/17 with recurrent hemoptysis. 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  Hospital course complicated by stroke and PAF- AC on hold due to hemoptysis; 9/21-FB/BAL done by Dr. Arias. Intraop: Bleeding from DAVID (non-lingular)-Recom IR to embolize; 9/22 CT angio done per IR Lobko request.  Formal PFTS and ask Card to fix PFO now to decrease blood flow; 9/24- Developed worsening hemoptysis. Emergently kristel to OR and had  Left thoractomy DAVID. Brought to CTICU.  9/24: 1400ml EBL in OR; 10 u PRBCs;9/25-Brought back to OR for bleeding. Had Reop Thoracotomy, evac left hemothorax. CTI- DAVID space despite -40 Sxn, MEYER, L Hemiparesis (CVA) better w walking; Patient post op with prolongd air leak shila ptx, bedside pleurodesis done.  Developed SIRS after pleurodesis-> CTICU; On pressors. Pt. clinically improved in CTICU. Maintained on Steroids, weaned down to Solumedrol BID. Off pressors and transitioned to Midodrine. Still with thrombocytopenia but no bleeding. Continued on Eliquis but no ASA for h/o CVA. Neuro deficits improving.  Air leak resolved on 10/27. 10/30 Chest tube removed, cxr stable.  Steriods to po taper.  Patient stable for discharge.  11/2-Pt ambulating w PT frequently. Feels well. Pain controlled. All labs and vital signs stable. Wound healing. Arnold po steroid taper. To complete Voriconazole on Nov 4th. CXRs and reports reviewed by and cleared by Dr. Caldera. Pt. to fu for PFO closure as output. Still requires intermittent oxygen. Cleared for discharge by Dr. Caldera to acute rehab.    Patient presented to [place] ER after rehab send patient with hemoptysis, patient had 2 episodes of hemoptysis, less than 10cc in total for 24 hours.  Patient states it happens when he coughs, blood tinged sputum.  Patient otherwise doing well in rehab. (09 Nov 2017 00:32)      Procedure:  POD # :     Issues:        Interval/Overnight Events/ ROS  Pt remained hemodynamically stable overnight, not on any pressors or inotropes. OOB to chair, breathing comfortably with minimal pain. Ambulated several times . Denies pain, no SOB, no palpitations, no nausea/ no vomiting, no dizziness  A-line and mock d/carmen         PAST MEDICAL & SURGICAL HISTORY:  History of pneumothorax: History of 3 prior pneumonthoracies.  COPD (chronic obstructive pulmonary disease)  Asthma  Hypertension  Sarcoidosis  History of thoracotomy: 9/24/17 Left thoracotomy, Left upper lobectomy   9/25/17 Reop left thoracotomy, evacuation of left hemothorax    Allergies    No Known Allergies    Intolerances            ***VITAL SIGNS:  Vital Signs Last 24 Hrs  T(C): 35.8 (16 Nov 2017 08:00), Max: 38.1 (15 Nov 2017 20:00)  T(F): 96.5 (16 Nov 2017 08:00), Max: 100.6 (15 Nov 2017 20:00)  HR: 71 (16 Nov 2017 19:00) (44 - 107)  BP: 156/121 (16 Nov 2017 04:10) (86/68 - 169/117)  BP(mean): 129 (16 Nov 2017 04:10) (71 - 129)  RR: 20 (16 Nov 2017 19:00) (14 - 24)  SpO2: 100% (16 Nov 2017 19:00) (95% - 100%)    I/Os:   I&O's Detail    15 Nov 2017 07:01  -  16 Nov 2017 07:00  --------------------------------------------------------  IN:    cisatracurium Infusion: 148.3 mL    dexmedetomidine Infusion: 266.3 mL    fentaNYL  Infusion: 127.5 mL    IV PiggyBack: 1625 mL    Lactated Ringers IV Bolus: 500 mL    lactated ringers.: 75 mL    phenylephrine   Infusion: 140 mL    phenylephrine   Infusion: 142.2 mL    phenylephrine   Infusion: 59.1 mL    sodium chloride 0.9%: 975 mL    sodium chloride 0.9%.: 180 mL  Total IN: 4238.3 mL    OUT:    Indwelling Catheter - Urethral: 755 mL    Voided: 310 mL  Total OUT: 1065 mL    Total NET: 3173.3 mL      16 Nov 2017 07:01  -  16 Nov 2017 19:15  --------------------------------------------------------  IN:    cisatracurium Infusion: 279 mL    dexmedetomidine Infusion: 225 mL    fentaNYL  Infusion: 236.3 mL    phenylephrine   Infusion: 5.6 mL    sodium chloride 0.9%.: 610 mL  Total IN: 1355.9 mL    OUT:    Indwelling Catheter - Urethral: 1190 mL  Total OUT: 1190 mL    Total NET: 165.9 mL          CAPILLARY BLOOD GLUCOSE          =======================  MEDICATIONS  ===================  MEDICATIONS  (STANDING):  ALBUTerol    90 MICROgram(s) HFA Inhaler 2 Puff(s) Inhalation every 6 hours  atorvastatin 80 milliGRAM(s) Oral at bedtime  benzonatate 100 milliGRAM(s) Oral three times a day  buDESOnide 160 MICROgram(s)/formoterol 4.5 MICROgram(s) Inhaler 2 Puff(s) Inhalation two times a day  cisatracurium Infusion 2 MICROgram(s)/kG/Min (9 mL/Hr) IV Continuous <Continuous>  dexmedetomidine Infusion 0.3 MICROgram(s)/kG/Hr (5.625 mL/Hr) IV Continuous <Continuous>  diltiazem Injectable 10 milliGRAM(s) IV Push once  diVALproex  milliGRAM(s) Oral two times a day  docusate sodium 100 milliGRAM(s) Oral two times a day  fentaNYL   Infusion 1 MICROgram(s)/kG/Hr (7.5 mL/Hr) IV Continuous <Continuous>  ferrous    sulfate 325 milliGRAM(s) Oral three times a day with meals  folic acid 1 milliGRAM(s) Oral daily  ipratropium 17 MICROgram(s) HFA Inhaler 1 Puff(s) Inhalation every 6 hours  lidocaine   Patch 1 Patch Transdermal daily  magnesium sulfate  IVPB 2 Gram(s) IV Intermittent once  methylPREDNISolone sodium succinate Injectable 20 milliGRAM(s) IV Push every 8 hours  pantoprazole  Injectable 40 milliGRAM(s) IV Push daily  phenylephrine    Infusion 0.3 MICROgram(s)/kG/Min (8.438 mL/Hr) IV Continuous <Continuous>  piperacillin/tazobactam IVPB. 3.375 Gram(s) IV Intermittent every 8 hours  polyethylene glycol 3350 17 Gram(s) Oral at bedtime  sodium chloride 0.9% Bolus 250 milliLiter(s) IV Bolus once  sodium chloride 0.9%. 1000 milliLiter(s) (30 mL/Hr) IV Continuous <Continuous>  vancomycin  IVPB 1000 milliGRAM(s) IV Intermittent every 12 hours  voriconazole IVPB 300 milliGRAM(s) IV Intermittent every 12 hours    MEDICATIONS  (PRN):  oxyCODONE    IR 5 milliGRAM(s) Oral every 4 hours PRN Moderate Pain (4 - 6)      ======================VENTILATOR SETTINGS  ==============  Mode: AC/ CMV (Assist Control/ Continuous Mandatory Ventilation)  RR (machine): 20  TV (machine): 350  FiO2: 50  PEEP: 5  MAP: 16  PIP: 42      =================== PATIENT CARE ACCESS DEVICES ==========  Peripheral IV  Central Venous Line	R	L	IJ	Fem	SC			Placed:   Arterial Line	R	L	PT	DP	Fem	Rad	Ax	Placed:   Midline:				  Urinary Catheter, Date Placed:   Necessity of urinary, arterial, and venous catheters discussed    ======================= PHYSICAL EXAM===================  General:                         Comfortable, Awake, alert, not in any distress  Neuro:                            Moving all extremities to commands. No focal deficits	  HEENT:                           GIOVANI/ ETT/ NGT/ trach  Respiratory:	Lungs clear on auscultation bilaterally with good aeration.                                           No rales, rhonchi, no wheezing. Effort even and unlabored.  CV:		Regular rate and rhythm. Normal S1/S2. No murmurs  Abdomen:	                     Soft,  nontender, not-distended. Bowel sounds present / absent.   Skin:		No rash.  Extremities:	Warm, no cyanosis or edema.  Palpable pulses    ============================ LABS =======================                        9.1    8.92  )-----------( 73       ( 16 Nov 2017 16:00 )             28.6     11-16    133<L>  |  91<L>  |  16  ----------------------------<  219<H>  5.0   |  30  |  0.71    Ca    8.6      16 Nov 2017 16:00  Phos  6.0     11-16  Mg     1.5     11-16    TPro  6.2  /  Alb  3.1<L>  /  TBili  0.2  /  DBili  x   /  AST  8   /  ALT  7   /  AlkPhos  62  11-16    LIVER FUNCTIONS - ( 16 Nov 2017 16:00 )  Alb: 3.1 g/dL / Pro: 6.2 g/dL / ALK PHOS: 62 u/L / ALT: 7 u/L / AST: 8 u/L / GGT: x           PT/INR - ( 16 Nov 2017 16:00 )   PT: 14.4 SEC;   INR: 1.28          PTT - ( 16 Nov 2017 16:00 )  PTT:26.1 SEC  ABG - ( 16 Nov 2017 16:00 )  pH: 7.37  /  pCO2: 60    /  pO2: 342   / HCO3: 32    / Base Excess: 8.5   /  SaO2: 100.0               BRONCHIAL LAVAGE  11-15-17 --  --  --      BLOOD  11-15-17 --  --  --      SPUTUM  11-14-17 --  --  --      URINE MIDSTREAM  11-13-17 --  --  --      BLOOD PERIPHERAL  11-13-17 --  --  --          ===================== IMAGING STUDIES ===================  Radiology personally reviewed.    ====================ASSESSMENT AND PLAN ================      ====================== NEUROLOGY=======================  Pain control with PCA / PCEA / Tylenol IV / Toradol / Percocet  Pt is on Precedex for agitation  Pt is sedated with Propofol / Fentanyl    ==================== RESPIRATORY========================  Pt is on            L nasal canula / Face tent____% FiO2  Comfortable, no evidence of distress.  Using incentive spirometry & doing                ml  Monitor chest tube output  Chest tube to suction / water seal	    Mechanical Ventilation:  Mode: AC/ CMV (Assist Control/ Continuous Mandatory Ventilation)  RR (machine): 20  TV (machine): 350  FiO2: 50  PEEP: 5  MAP: 16  PIP: 42    Mechanical ventilator status assessed & settings reviewed  Continue bronchodilators, pulmonary toilet  Head of bed elevation to 30-40 degrees    ====================CARDIOVASCULAR=====================  Continue hemodynamic monitoring/ telemetry  Not on any pressors  Continue cardiovascular / antihypertensive medications    ===================== RENAL ============================  Continue LR 30CC/hr      D/C IVF  Monitor I/Os, BUN/ Cr  and electrolytes  D/C Mock      Keep Mock  BPH: Continue Flomax/ Finasteride      ==================== GASTROINTESTINAL===================  On regular diet, tolerating well  Continue GI prophylaxis with Pepcid / Protonix  Continue Zofran / Reglan for nausea - PRN	  NPO    =======================    ENDOCRIN  =====================  Glycemic monitoring  F/S with coverage  ===================HEMATOLOGIC/ONCOLOGIC =============  Monitor chest tube output. No signs of active bleeding.   Follow CBC, coags  in AM  DVT prophylaxis with SCD, sc Heparin    ========================INFECTIOUS DISEASE===============  No signs of infection. Monitor for fever / leukocytosis.  All surgical incision / chest tube  sites look clean  D/C Mock      Pertinent clinical, laboratory, radiographic, hemodynamic, echocardiographic, respiratory data, microbiologic data and chart were reviewed and analyzed frequently throughout the course of the day and night. GI and DVT prophylaxis, glycemic control, head of bed elevation and skin care issues were addressed.  Patient seen, examined and plan discussed with CT Surgery / CTICU team during rounds.         I have spent               minutes of critical care time with this pt between            am/pm    and               am/ pm         minutes spent on total encounter; more than 50% of the visit was spent counseling and/or coordinating care by the attending physician.        KIM Robbins MD [facility]          MRN-[id number]    HPI  47 M PMH of sarcoidosis, HTN, COPD, and asthma who was initially admitted on 09/10 w cc of severe leg spasms X two days  In E.D. pt found to be wheezing, seen by Pulmonary and started on IV Steroids for sarcoidosis  On 9/12  Stroke codes were called as the patient became unresponsive.  He was transferred to MICU and then intubated for airway protection.   The patient was found to have a new infarct on MRI and also found to have a patent PFO .    There was concern patient was having paroxysmal afib-  was started on Eliquis. He has received 2 doses of Eliquis on 09/19. complicated by hemoptysis;  underwent Bronchoscopy with bronchoalveolar lavage with epi injection on 09/21 and was planned  to have  IR embolization for hemoptysis control in the next few days.  However, he developed hemoptysis again and underwent urgent DAVID lobectomy, complicated with post op bleeding.    Left VATS and DAVID lobectomy 9/24/17    Re-op L thoracotomy, evacuation of hemothorax (600cc clot, 400cc blood) no obvious site of bleeding  9/25/17  Re-op L thoracotomy, evacuation of hemothorax; RTOR x2 100cc clot; 200cc blood: bleeding posterior intercostal arteries identified and cauterized 9/25/17   10/23/17 bedside talc pleurodesis  Pt eventually discharged to rehab - readmitted on 11/09/17 with recurrent hemoptysis.     Procedure:   11/15/2017  Bronchoscopy with bronchoalveolar lavage  in OR        · Operative Findings	Bronchoscopy.  Blood identified in both the right and left airways, predominately on the left.  100cc of old blood suctioned.  Airways flushed and suctioned; no identification of active bleeding.  Bronchoalveolar lavage performed and specimen sent. 	                      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  Hospital course complicated by stroke and PAF- AC on hold due to hemoptysis; 9/21-FB/BAL done by Dr. Arias. Intraop: Bleeding from DAVID (non-lingular)-Recom IR to embolize; 9/22 CT angio done per IR Lobko request.  Formal PFTS and ask Card to fix PFO now to decrease blood flow; 9/24- Developed worsening hemoptysis. Emergently kristel to OR and had  Left thoractomy DAVID. Brought to CTICU.  9/24: 1400ml EBL in OR; 10 u PRBCs;9/25-Brought back to OR for bleeding. Had Reop Thoracotomy, evac left hemothorax. CTI- DAVID space despite -40 Sxn, MEYER, L Hemiparesis (CVA) better w walking; Patient post op with prolongd air leak shila ptx, bedside pleurodesis done.  Developed SIRS after pleurodesis-> CTICU; On pressors. Pt. clinically improved in CTICU. Maintained on Steroids, weaned down to Solumedrol BID. Off pressors and transitioned to Midodrine. Still with thrombocytopenia but no bleeding. Continued on Eliquis but no ASA for h/o CVA. Neuro deficits improving.  Air leak resolved on 10/27. 10/30 Chest tube removed, cxr stable.  Steriods to po taper.  Patient stable for discharge.  11/2-Pt ambulating w PT frequently. Feels well. Pain controlled. All labs and vital signs stable. Wound healing. Arnold po steroid taper. To complete Voriconazole on Nov 4th. CXRs and reports reviewed by and cleared by Dr. Caldera. Pt. to fu for PFO closure as output. Still requires intermittent oxygen. Cleared for discharge by Dr. Caldera to acute rehab.    Patient presented to [place] ER after rehab send patient with hemoptysis, patient had 2 episodes of hemoptysis, less than 10cc in total for 24 hours.  Patient states it happens when he coughs, blood tinged sputum.  Patient otherwise doing well in rehab. (09 Nov 2017 00:32)      Procedure:  POD # :     Issues:        Interval/Overnight Events/ ROS  Pt remained hemodynamically stable overnight, not on any pressors or inotropes. OOB to chair, breathing comfortably with minimal pain. Ambulated several times . Denies pain, no SOB, no palpitations, no nausea/ no vomiting, no dizziness  A-line and omck d/carmen         PAST MEDICAL & SURGICAL HISTORY:  History of pneumothorax: History of 3 prior pneumonthoracies.  COPD (chronic obstructive pulmonary disease)  Asthma  Hypertension  Sarcoidosis  History of thoracotomy: 9/24/17 Left thoracotomy, Left upper lobectomy   9/25/17 Reop left thoracotomy, evacuation of left hemothorax    Allergies    No Known Allergies    Intolerances            ***VITAL SIGNS:  Vital Signs Last 24 Hrs  T(C): 35.8 (16 Nov 2017 08:00), Max: 38.1 (15 Nov 2017 20:00)  T(F): 96.5 (16 Nov 2017 08:00), Max: 100.6 (15 Nov 2017 20:00)  HR: 71 (16 Nov 2017 19:00) (44 - 107)  BP: 156/121 (16 Nov 2017 04:10) (86/68 - 169/117)  BP(mean): 129 (16 Nov 2017 04:10) (71 - 129)  RR: 20 (16 Nov 2017 19:00) (14 - 24)  SpO2: 100% (16 Nov 2017 19:00) (95% - 100%)    I/Os:   I&O's Detail    15 Nov 2017 07:01  -  16 Nov 2017 07:00  --------------------------------------------------------  IN:    cisatracurium Infusion: 148.3 mL    dexmedetomidine Infusion: 266.3 mL    fentaNYL  Infusion: 127.5 mL    IV PiggyBack: 1625 mL    Lactated Ringers IV Bolus: 500 mL    lactated ringers.: 75 mL    phenylephrine   Infusion: 140 mL    phenylephrine   Infusion: 142.2 mL    phenylephrine   Infusion: 59.1 mL    sodium chloride 0.9%: 975 mL    sodium chloride 0.9%.: 180 mL  Total IN: 4238.3 mL    OUT:    Indwelling Catheter - Urethral: 755 mL    Voided: 310 mL  Total OUT: 1065 mL    Total NET: 3173.3 mL      16 Nov 2017 07:01  -  16 Nov 2017 19:15  --------------------------------------------------------  IN:    cisatracurium Infusion: 279 mL    dexmedetomidine Infusion: 225 mL    fentaNYL  Infusion: 236.3 mL    phenylephrine   Infusion: 5.6 mL    sodium chloride 0.9%.: 610 mL  Total IN: 1355.9 mL    OUT:    Indwelling Catheter - Urethral: 1190 mL  Total OUT: 1190 mL    Total NET: 165.9 mL          CAPILLARY BLOOD GLUCOSE          =======================  MEDICATIONS  ===================  MEDICATIONS  (STANDING):  ALBUTerol    90 MICROgram(s) HFA Inhaler 2 Puff(s) Inhalation every 6 hours  atorvastatin 80 milliGRAM(s) Oral at bedtime  benzonatate 100 milliGRAM(s) Oral three times a day  buDESOnide 160 MICROgram(s)/formoterol 4.5 MICROgram(s) Inhaler 2 Puff(s) Inhalation two times a day  cisatracurium Infusion 2 MICROgram(s)/kG/Min (9 mL/Hr) IV Continuous <Continuous>  dexmedetomidine Infusion 0.3 MICROgram(s)/kG/Hr (5.625 mL/Hr) IV Continuous <Continuous>  diltiazem Injectable 10 milliGRAM(s) IV Push once  diVALproex  milliGRAM(s) Oral two times a day  docusate sodium 100 milliGRAM(s) Oral two times a day  fentaNYL   Infusion 1 MICROgram(s)/kG/Hr (7.5 mL/Hr) IV Continuous <Continuous>  ferrous    sulfate 325 milliGRAM(s) Oral three times a day with meals  folic acid 1 milliGRAM(s) Oral daily  ipratropium 17 MICROgram(s) HFA Inhaler 1 Puff(s) Inhalation every 6 hours  lidocaine   Patch 1 Patch Transdermal daily  magnesium sulfate  IVPB 2 Gram(s) IV Intermittent once  methylPREDNISolone sodium succinate Injectable 20 milliGRAM(s) IV Push every 8 hours  pantoprazole  Injectable 40 milliGRAM(s) IV Push daily  phenylephrine    Infusion 0.3 MICROgram(s)/kG/Min (8.438 mL/Hr) IV Continuous <Continuous>  piperacillin/tazobactam IVPB. 3.375 Gram(s) IV Intermittent every 8 hours  polyethylene glycol 3350 17 Gram(s) Oral at bedtime  sodium chloride 0.9% Bolus 250 milliLiter(s) IV Bolus once  sodium chloride 0.9%. 1000 milliLiter(s) (30 mL/Hr) IV Continuous <Continuous>  vancomycin  IVPB 1000 milliGRAM(s) IV Intermittent every 12 hours  voriconazole IVPB 300 milliGRAM(s) IV Intermittent every 12 hours    MEDICATIONS  (PRN):  oxyCODONE    IR 5 milliGRAM(s) Oral every 4 hours PRN Moderate Pain (4 - 6)      ======================VENTILATOR SETTINGS  ==============  Mode: AC/ CMV (Assist Control/ Continuous Mandatory Ventilation)  RR (machine): 20  TV (machine): 350  FiO2: 50  PEEP: 5  MAP: 16  PIP: 42      =================== PATIENT CARE ACCESS DEVICES ==========  Peripheral IV  Central Venous Line	R	L	IJ	Fem	SC			Placed:   Arterial Line	R	L	PT	DP	Fem	Rad	Ax	Placed:   Midline:				  Urinary Catheter, Date Placed:   Necessity of urinary, arterial, and venous catheters discussed    ======================= PHYSICAL EXAM===================  General:                         Comfortable, Awake, alert, not in any distress  Neuro:                            Moving all extremities to commands. No focal deficits	  HEENT:                           GIOVANI/ ETT/ NGT/ trach  Respiratory:	Lungs clear on auscultation bilaterally with good aeration.                                           No rales, rhonchi, no wheezing. Effort even and unlabored.  CV:		Regular rate and rhythm. Normal S1/S2. No murmurs  Abdomen:	                     Soft,  nontender, not-distended. Bowel sounds present / absent.   Skin:		No rash.  Extremities:	Warm, no cyanosis or edema.  Palpable pulses    ============================ LABS =======================                        9.1    8.92  )-----------( 73       ( 16 Nov 2017 16:00 )             28.6     11-16    133<L>  |  91<L>  |  16  ----------------------------<  219<H>  5.0   |  30  |  0.71    Ca    8.6      16 Nov 2017 16:00  Phos  6.0     11-16  Mg     1.5     11-16    TPro  6.2  /  Alb  3.1<L>  /  TBili  0.2  /  DBili  x   /  AST  8   /  ALT  7   /  AlkPhos  62  11-16    LIVER FUNCTIONS - ( 16 Nov 2017 16:00 )  Alb: 3.1 g/dL / Pro: 6.2 g/dL / ALK PHOS: 62 u/L / ALT: 7 u/L / AST: 8 u/L / GGT: x           PT/INR - ( 16 Nov 2017 16:00 )   PT: 14.4 SEC;   INR: 1.28          PTT - ( 16 Nov 2017 16:00 )  PTT:26.1 SEC  ABG - ( 16 Nov 2017 16:00 )  pH: 7.37  /  pCO2: 60    /  pO2: 342   / HCO3: 32    / Base Excess: 8.5   /  SaO2: 100.0               BRONCHIAL LAVAGE  11-15-17 --  --  --      BLOOD  11-15-17 --  --  --      SPUTUM  11-14-17 --  --  --      URINE MIDSTREAM  11-13-17 --  --  --      BLOOD PERIPHERAL  11-13-17 --  --  --          ===================== IMAGING STUDIES ===================  Radiology personally reviewed.    ====================ASSESSMENT AND PLAN ================      ====================== NEUROLOGY=======================  Pain control with PCA / PCEA / Tylenol IV / Toradol / Percocet  Pt is on Precedex for agitation  Pt is sedated with Propofol / Fentanyl    ==================== RESPIRATORY========================  Pt is on            L nasal canula / Face tent____% FiO2  Comfortable, no evidence of distress.  Using incentive spirometry & doing                ml  Monitor chest tube output  Chest tube to suction / water seal	    Mechanical Ventilation:  Mode: AC/ CMV (Assist Control/ Continuous Mandatory Ventilation)  RR (machine): 20  TV (machine): 350  FiO2: 50  PEEP: 5  MAP: 16  PIP: 42    Mechanical ventilator status assessed & settings reviewed  Continue bronchodilators, pulmonary toilet  Head of bed elevation to 30-40 degrees    ====================CARDIOVASCULAR=====================  Continue hemodynamic monitoring/ telemetry  Not on any pressors  Continue cardiovascular / antihypertensive medications    ===================== RENAL ============================  Continue LR 30CC/hr      D/C IVF  Monitor I/Os, BUN/ Cr  and electrolytes  D/C Mock      Keep Mock  BPH: Continue Flomax/ Finasteride      ==================== GASTROINTESTINAL===================  On regular diet, tolerating well  Continue GI prophylaxis with Pepcid / Protonix  Continue Zofran / Reglan for nausea - PRN	  NPO    =======================    ENDOCRIN  =====================  Glycemic monitoring  F/S with coverage  ===================HEMATOLOGIC/ONCOLOGIC =============  Monitor chest tube output. No signs of active bleeding.   Follow CBC, coags  in AM  DVT prophylaxis with SCD, sc Heparin    ========================INFECTIOUS DISEASE===============  No signs of infection. Monitor for fever / leukocytosis.  All surgical incision / chest tube  sites look clean  D/C Mock      Pertinent clinical, laboratory, radiographic, hemodynamic, echocardiographic, respiratory data, microbiologic data and chart were reviewed and analyzed frequently throughout the course of the day and night. GI and DVT prophylaxis, glycemic control, head of bed elevation and skin care issues were addressed.  Patient seen, examined and plan discussed with CT Surgery / CTICU team during rounds.         I have spent               minutes of critical care time with this pt between            am/pm    and               am/ pm         minutes spent on total encounter; more than 50% of the visit was spent counseling and/or coordinating care by the attending physician.        KIM Robbins MD [facility]SC[facility]O          MRN-[id number]    HPI  47 M PMH of sarcoidosis, HTN, COPD, and asthma who was initially admitted on 09/10 w cc of severe leg spasms X two days  In E.D. pt found to be wheezing, seen by Pulmonary and started on IV Steroids for sarcoidosis  On 9/12  Stroke codes were called as the patient became unresponsive.  He was transferred to MICU and then intubated for airway protection.   The patient was found to have a new infarct on MRI and also found to have a patent PFO .    There was concern patient was having paroxysmal afib-  was started on Eliquis. He has received 2 doses of Eliquis on 09/19. complicated by hemoptysis;  underwent Bronchoscopy with bronchoalveolar lavage with epi injection on 09/21 and was planned  to have  IR embolization for hemoptysis control in the next few days.  However, he developed hemoptysis again and underwent urgent DAVID lobectomy, complicated with post op bleeding.    Left VATS and DAVID lobectomy 9/24/17    Re-op L thoracotomy, evacuation of hemothorax (600cc clot, 400cc blood) no obvious site of bleeding  9/25/17  Re-op L thoracotomy, evacuation of hemothorax; RTOR x2 100cc clot; 200cc blood: bleeding posterior intercostal arteries identified and cauterized 9/25/17   10/23/17 bedside talc pleurodesis  Pt eventually discharged to rehab - readmitted on 11/09/17 with recurrent hemoptysis.     Procedure:   11/15/2017  Bronchoscopy with bronchoalveolar lavage  in OR        · Operative Findings	Bronchoscopy.  Blood identified in both the right and left airways, predominately on the left.  100cc of old blood suctioned.  Airways flushed and suctioned; no identification of active bleeding.  Bronchoalveolar lavage performed and specimen sent. 	                      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 Intubated, paralysed, sedated. Telemetry - with intermittent sinus tachycardia / with elevated BP treated with 10 iv Cardizem x 2 today.        PAST MEDICAL & SURGICAL HISTORY:  History of pneumothorax: History of 3 prior pneumonthoracies.  COPD (chronic obstructive pulmonary disease)  Asthma  Hypertension  Sarcoidosis  History of thoracotomy: 9/24/17 Left thoracotomy, Left upper lobectomy   9/25/17 Reop left thoracotomy, evacuation of left hemothorax    Allergies  No Known Allergies        ***VITAL SIGNS:  Vital Signs Last 24 Hrs  T(C): 35.8 (16 Nov 2017 08:00), Max: 38.1 (15 Nov 2017 20:00)  T(F): 96.5 (16 Nov 2017 08:00), Max: 100.6 (15 Nov 2017 20:00)  HR: 71 (16 Nov 2017 19:00) (44 - 107)  BP: 156/121 (16 Nov 2017 04:10) (86/68 - 169/117)  BP(mean): 129 (16 Nov 2017 04:10) (71 - 129)  RR: 20 (16 Nov 2017 19:00) (14 - 24)  SpO2: 100% (16 Nov 2017 19:00) (95% - 100%)    I/Os:   I&O's Detail    15 Nov 2017 07:01  -  16 Nov 2017 07:00  --------------------------------------------------------  IN:    cisatracurium Infusion: 148.3 mL    dexmedetomidine Infusion: 266.3 mL    fentaNYL  Infusion: 127.5 mL    IV PiggyBack: 1625 mL    Lactated Ringers IV Bolus: 500 mL    lactated ringers.: 75 mL    phenylephrine   Infusion: 140 mL    phenylephrine   Infusion: 142.2 mL    phenylephrine   Infusion: 59.1 mL    sodium chloride 0.9%: 975 mL    sodium chloride 0.9%.: 180 mL  Total IN: 4238.3 mL    OUT:    Indwelling Catheter - Urethral: 755 mL    Voided: 310 mL  Total OUT: 1065 mL    Total NET: 3173.3 mL      16 Nov 2017 07:01  -  16 Nov 2017 19:15  --------------------------------------------------------  IN:    cisatracurium Infusion: 279 mL    dexmedetomidine Infusion: 225 mL    fentaNYL  Infusion: 236.3 mL    phenylephrine   Infusion: 5.6 mL    sodium chloride 0.9%.: 610 mL  Total IN: 1355.9 mL    OUT:    Indwelling Catheter - Urethral: 1190 mL  Total OUT: 1190 mL    Total NET: 165.9 mL        =======================  MEDICATIONS  ===================  MEDICATIONS  (STANDING):    atorvastatin 80 milliGRAM(s) Oral at bedtime  benzonatate 100 milliGRAM(s) Oral three times a day  buDESOnide 160 MICROgram(s)/formoterol 4.5 MICROgram(s) Inhaler 2 Puff(s) Inhalation two times a day  cisatracurium Infusion 2 MICROgram(s)/kG/Min (9 mL/Hr) IV Continuous <Continuous>  dexmedetomidine Infusion 0.3 MICROgram(s)/kG/Hr (5.625 mL/Hr) IV Continuous <Continuous>  diltiazem Injectable 10 milliGRAM(s) IV Push once  diVALproex  milliGRAM(s) Oral two times a day  docusate sodium 100 milliGRAM(s) Oral two times a day  fentaNYL   Infusion 1 MICROgram(s)/kG/Hr (7.5 mL/Hr) IV Continuous <Continuous>  ferrous    sulfate 325 milliGRAM(s) Oral three times a day with meals  folic acid 1 milliGRAM(s) Oral daily  ipratropium 17 MICROgram(s) HFA Inhaler 1 Puff(s) Inhalation every 6 hours  lidocaine   Patch 1 Patch Transdermal daily  magnesium sulfate  IVPB 2 Gram(s) IV Intermittent once  methylPREDNISolone sodium succinate Injectable 20 milliGRAM(s) IV Push every 8 hours  pantoprazole  Injectable 40 milliGRAM(s) IV Push daily  phenylephrine    Infusion 0.3 MICROgram(s)/kG/Min (8.438 mL/Hr) IV Continuous <Continuous>  piperacillin/tazobactam IVPB. 3.375 Gram(s) IV Intermittent every 8 hours  polyethylene glycol 3350 17 Gram(s) Oral at bedtime  sodium chloride 0.9% Bolus 250 milliLiter(s) IV Bolus once  sodium chloride 0.9%. 1000 milliLiter(s) (30 mL/Hr) IV Continuous <Continuous>  vancomycin  IVPB 1000 milliGRAM(s) IV Intermittent every 12 hours  voriconazole IVPB 300 milliGRAM(s) IV Intermittent every 12 hours    MEDICATIONS  (PRN):  oxyCODONE    IR 5 milliGRAM(s) Oral every 4 hours PRN Moderate Pain (4 - 6)  ALBUTerol    90 MICROgram(s) HFA Inhaler 2 Puff(s) Inhalation every 6 hours    ======================VENTILATOR SETTINGS  ==============  Mode: AC/ CMV (Assist Control/ Continuous Mandatory Ventilation)  RR (machine): 20  TV (machine): 350  FiO2: 50  PEEP: 5  MAP: 16  PIP: 42      =================== PATIENT CARE ACCESS DEVICES ==========  Peripheral IV  Central Venous Line	R	L	IJ	Fem	SC			Placed:   Arterial Line	R	L	PT	DP	Fem	Rad	Ax	Placed:   Midline:				  Urinary Catheter, Date Placed:   Necessity of urinary, arterial, and venous catheters discussed    ======================= PHYSICAL EXAM===================  General:                         Comfortable, Awake, alert, not in any distress  Neuro:                            Moving all extremities to commands. No focal deficits	  HEENT:                           GIOVANI/ ETT/ NGT/ trach  Respiratory:	Lungs clear on auscultation bilaterally with good aeration.                                           No rales, rhonchi, no wheezing. Effort even and unlabored.  CV:		Regular rate and rhythm. Normal S1/S2. No murmurs  Abdomen:	                     Soft,  nontender, not-distended. Bowel sounds present / absent.   Skin:		No rash.  Extremities:	Warm, no cyanosis or edema.  Palpable pulses    ============================ LABS =======================                        9.1    8.92  )-----------( 73       ( 16 Nov 2017 16:00 )             28.6     11-16    133<L>  |  91<L>  |  16  ----------------------------<  219<H>  5.0   |  30  |  0.71    Ca    8.6      16 Nov 2017 16:00  Phos  6.0     11-16  Mg     1.5     11-16    TPro  6.2  /  Alb  3.1<L>  /  TBili  0.2  /  DBili  x   /  AST  8   /  ALT  7   /  AlkPhos  62  11-16    LIVER FUNCTIONS - ( 16 Nov 2017 16:00 )  Alb: 3.1 g/dL / Pro: 6.2 g/dL / ALK PHOS: 62 u/L / ALT: 7 u/L / AST: 8 u/L / GGT: x           PT/INR - ( 16 Nov 2017 16:00 )   PT: 14.4 SEC;   INR: 1.28          PTT - ( 16 Nov 2017 16:00 )  PTT:26.1 SEC  ABG - ( 16 Nov 2017 16:00 )  pH: 7.37  /  pCO2: 60    /  pO2: 342   / HCO3: 32    / Base Excess: 8.5   /  SaO2: 100.0               BRONCHIAL LAVAGE  11-15-17 --  --  --      BLOOD  11-15-17 --  --  --      SPUTUM  11-14-17 --  --  --      URINE MIDSTREAM  11-13-17 --  --  --      BLOOD PERIPHERAL  11-13-17 --  --  --          ===================== IMAGING STUDIES ===================  Radiology personally reviewed.    ====================ASSESSMENT AND PLAN ================      ====================== NEUROLOGY=======================  Pain control with PCA / PCEA / Tylenol IV / Toradol / Percocet  Pt is on Precedex for agitation  Pt is sedated with Propofol / Fentanyl    ==================== RESPIRATORY========================  Pt is on            L nasal canula / Face tent____% FiO2  Comfortable, no evidence of distress.  Using incentive spirometry & doing                ml  Monitor chest tube output  Chest tube to suction / water seal	    Mechanical Ventilation:  Mode: AC/ CMV (Assist Control/ Continuous Mandatory Ventilation)  RR (machine): 20  TV (machine): 350  FiO2: 50  PEEP: 5  MAP: 16  PIP: 42    Mechanical ventilator status assessed & settings reviewed  Continue bronchodilators, pulmonary toilet  Head of bed elevation to 30-40 degrees    ====================CARDIOVASCULAR=====================  Continue hemodynamic monitoring/ telemetry  Not on any pressors  Continue cardiovascular / antihypertensive medications    ===================== RENAL ============================  Continue LR 30CC/hr      D/C IVF  Monitor I/Os, BUN/ Cr  and electrolytes  D/C Pickens      Keep Pickens  BPH: Continue Flomax/ Finasteride      ==================== GASTROINTESTINAL===================  On regular diet, tolerating well  Continue GI prophylaxis with Pepcid / Protonix  Continue Zofran / Reglan for nausea - PRN	  NPO    =======================    ENDOCRIN  =====================  Glycemic monitoring  F/S with coverage  ===================HEMATOLOGIC/ONCOLOGIC =============  Monitor chest tube output. No signs of active bleeding.   Follow CBC, coags  in AM  DVT prophylaxis with SCD, sc Heparin    ========================INFECTIOUS DISEASE===============  No signs of infection. Monitor for fever / leukocytosis.  All surgical incision / chest tube  sites look clean  D/C [place]      Pertinent clinical, laboratory, radiographic, hemodynamic, echocardiographic, respiratory data, microbiologic data and chart were reviewed and analyzed frequently throughout the course of the day and night. GI and DVT prophylaxis, glycemic control, head of bed elevation and skin care issues were addressed.  Patient seen, examined and plan discussed with CT Surgery / CTICU team during rounds.         I have spent               minutes of critical care time with this pt between            am/pm    and               am/ pm         minutes spent on total encounter; more than 50% of the visit was spent counseling and/or coordinating care by the attending physician.        KIM Robbins MD [facility]          MRN-[id number]    HPI  47 M PMH of sarcoidosis, HTN, COPD, and asthma who was initially admitted on 09/10 w cc of severe leg spasms X two days  In E.D. pt found to be wheezing, seen by Pulmonary and started on IV Steroids for sarcoidosis  On 9/12  Stroke codes were called as the patient became unresponsive.  He was transferred to MICU and then intubated for airway protection.   The patient was found to have a new infarct on MRI and also found to have a patent PFO .    There was concern patient was having paroxysmal afib-  was started on Eliquis. He has received 2 doses of Eliquis on 09/19. complicated by hemoptysis;  underwent Bronchoscopy with bronchoalveolar lavage with epi injection on 09/21 and was planned  to have  IR embolization for hemoptysis control in the next few days.  However, he developed hemoptysis again and underwent urgent DAVID lobectomy, complicated with post op bleeding.    Left VATS and DAVID lobectomy 9/24/17    Re-op L thoracotomy, evacuation of hemothorax (600cc clot, 400cc blood) no obvious site of bleeding  9/25/17  Re-op L thoracotomy, evacuation of hemothorax; RTOR x2 100cc clot; 200cc blood: bleeding posterior intercostal arteries identified and cauterized 9/25/17   10/23/17 bedside talc pleurodesis  Pt eventually discharged to rehab - readmitted on 11/09/17 with recurrent hemoptysis.     Procedure:   11/15/2017  Bronchoscopy with bronchoalveolar lavage  in OR        · Operative Findings	Bronchoscopy.  Blood identified in both the right and left airways, predominately on the left.  100cc of old blood suctioned.  Airways flushed and suctioned; no identification of active bleeding.  Bronchoalveolar lavage performed and specimen sent. 	                      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 Intubated, paralysed, sedated. Telemetry - with intermittent sinus tachycardia / with elevated BP treated with 10 iv Cardizem x 2 today.        PAST MEDICAL & SURGICAL HISTORY:  History of pneumothorax: History of 3 prior pneumonthoracies.  COPD (chronic obstructive pulmonary disease)  Asthma  Hypertension  Sarcoidosis  History of thoracotomy: 9/24/17 Left thoracotomy, Left upper lobectomy   9/25/17 Reop left thoracotomy, evacuation of left hemothorax    Allergies  No Known Allergies        ***VITAL SIGNS:  Vital Signs Last 24 Hrs  T(C): 35.8 (16 Nov 2017 08:00), Max: 38.1 (15 Nov 2017 20:00)  T(F): 96.5 (16 Nov 2017 08:00), Max: 100.6 (15 Nov 2017 20:00)  HR: 71 (16 Nov 2017 19:00) (44 - 107)  BP: 156/121 (16 Nov 2017 04:10) (86/68 - 169/117)  BP(mean): 129 (16 Nov 2017 04:10) (71 - 129)  RR: 20 (16 Nov 2017 19:00) (14 - 24)  SpO2: 100% (16 Nov 2017 19:00) (95% - 100%)    I/Os:   I&O's Detail    15 Nov 2017 07:01  -  16 Nov 2017 07:00  --------------------------------------------------------  IN:    cisatracurium Infusion: 148.3 mL    dexmedetomidine Infusion: 266.3 mL    fentaNYL  Infusion: 127.5 mL    IV PiggyBack: 1625 mL    Lactated Ringers IV Bolus: 500 mL    lactated ringers.: 75 mL    phenylephrine   Infusion: 140 mL    phenylephrine   Infusion: 142.2 mL    phenylephrine   Infusion: 59.1 mL    sodium chloride 0.9%: 975 mL    sodium chloride 0.9%.: 180 mL  Total IN: 4238.3 mL    OUT:    Indwelling Catheter - Urethral: 755 mL    Voided: 310 mL  Total OUT: 1065 mL    Total NET: 3173.3 mL      16 Nov 2017 07:01  -  16 Nov 2017 19:15  --------------------------------------------------------  IN:    cisatracurium Infusion: 279 mL    dexmedetomidine Infusion: 225 mL    fentaNYL  Infusion: 236.3 mL    phenylephrine   Infusion: 5.6 mL    sodium chloride 0.9%.: 610 mL  Total IN: 1355.9 mL    OUT:    Indwelling Catheter - Urethral: 1190 mL  Total OUT: 1190 mL    Total NET: 165.9 mL        =======================  MEDICATIONS  ===================  MEDICATIONS  (STANDING):    atorvastatin 80 milliGRAM(s) Oral at bedtime  benzonatate 100 milliGRAM(s) Oral three times a day  buDESOnide 160 MICROgram(s)/formoterol 4.5 MICROgram(s) Inhaler 2 Puff(s) Inhalation two times a day  cisatracurium Infusion 2 MICROgram(s)/kG/Min (9 mL/Hr) IV Continuous  dexmedetomidine Infusion 0.3 MICROgram(s)/kG/Hr (5.625 mL/Hr) IV Continuous   diltiazem Injectable 10 milliGRAM(s) IV Push once  diVALproex  milliGRAM(s) Oral two times a day  docusate sodium 100 milliGRAM(s) Oral two times a day  fentaNYL   Infusion 1 MICROgram(s)/kG/Hr (7.5 mL/Hr) IV Continuous   ferrous    sulfate 325 milliGRAM(s) Oral three times a day with meals  folic acid 1 milliGRAM(s) Oral daily  ipratropium 17 MICROgram(s) HFA Inhaler 1 Puff(s) Inhalation every 6 hours  lidocaine   Patch 1 Patch Transdermal daily  magnesium sulfate  IVPB 2 Gram(s) IV Intermittent once  methylPREDNISolone sodium succinate Injectable 20 milliGRAM(s) IV Push every 8 hours  pantoprazole  Injectable 40 milliGRAM(s) IV Push daily  phenylephrine    Infusion 0.3 MICROgram(s)/kG/Min (8.438 mL/Hr) IV Continuous   piperacillin/tazobactam IVPB. 3.375 Gram(s) IV Intermittent every 8 hours  polyethylene glycol 3350 17 Gram(s) Oral at bedtime  sodium chloride 0.9% Bolus 250 milliLiter(s) IV Bolus once  sodium chloride 0.9%. 1000 milliLiter(s) (30 mL/Hr) IV Continuous   vancomycin  IVPB 1000 milliGRAM(s) IV Intermittent every 12 hours  voriconazole IVPB 300 milliGRAM(s) IV Intermittent every 12 hours    MEDICATIONS  (PRN):  ALBUTerol    90 MICROgram(s) HFA Inhaler 2 Puff(s) Inhalation every 6 hours    ======================VENTILATOR SETTINGS  ==============  Mode: AC/ CMV (Assist Control/ Continuous Mandatory Ventilation)  RR (machine): 20  TV (machine): 350  FiO2: 50  PEEP: 5  MAP: 16  PIP: 42      =================== PATIENT CARE ACCESS DEVICES ==========  Peripheral IV (+)     Arterial Line	R	  			  Urinary Catheter (+)   Necessity of urinary, arterial, and venous catheters discussed    ======================= PHYSICAL EXAM===================  General:                         Comfortable, Awake, alert, not in any distress  Neuro:                            Moving all extremities to commands. No focal deficits	  HEENT:                           GIOVANI/ ETT/ NGT/ trach  Respiratory:	Lungs clear on auscultation bilaterally with good aeration.                                           No rales, rhonchi, no wheezing. Effort even and unlabored.  CV:		Regular rate and rhythm. Normal S1/S2. No murmurs  Abdomen:	                     Soft,  nontender, not-distended. Bowel sounds present / absent.   Skin:		No rash.  Extremities:	Warm, no cyanosis or edema.  Palpable pulses    ============================ LABS =======================                        9.1    8.92  )-----------( 73       ( 16 Nov 2017 16:00 )             28.6     11-16    133<L>  |  91<L>  |  16  ----------------------------<  219<H>  5.0   |  30  |  0.71    Ca    8.6      16 Nov 2017 16:00  Phos  6.0     11-16  Mg     1.5     11-16    TPro  6.2  /  Alb  3.1<L>  /  TBili  0.2  /  DBili  x   /  AST  8   /  ALT  7   /  AlkPhos  62  11-16    LIVER FUNCTIONS - ( 16 Nov 2017 16:00 )  Alb: 3.1 g/dL / Pro: 6.2 g/dL / ALK PHOS: 62 u/L / ALT: 7 u/L / AST: 8 u/L / GGT: x           PT/INR - ( 16 Nov 2017 16:00 )   PT: 14.4 SEC;   INR: 1.28          PTT - ( 16 Nov 2017 16:00 )  PTT:26.1 SEC  ABG - ( 16 Nov 2017 16:00 )  pH: 7.37  /  pCO2: 60    /  pO2: 342   / HCO3: 32    / Base Excess: 8.5   /  SaO2: 100.0               BRONCHIAL LAVAGE  11-15-17 --  --  --      BLOOD  11-15-17 --  --  --      SPUTUM  11-14-17 --  --  --      URINE MIDSTREAM  11-13-17 --  --  --      BLOOD PERIPHERAL  11-13-17 --  --  --          ===================== IMAGING STUDIES ===================  Radiology personally reviewed.    ====================ASSESSMENT AND PLAN ================      ====================== NEUROLOGY=======================  Pain control with PCA / PCEA / Tylenol IV / Toradol / Percocet  Pt is on Precedex for agitation  Pt is sedated with Propofol / Fentanyl    ==================== RESPIRATORY========================  Pt is on            L nasal canula / Face tent____% FiO2  Comfortable, no evidence of distress.  Using incentive spirometry & doing                ml  Monitor chest tube output  Chest tube to suction / water seal	    Mechanical Ventilation:  Mode: AC/ CMV (Assist Control/ Continuous Mandatory Ventilation)  RR (machine): 20  TV (machine): 350  FiO2: 50  PEEP: 5  MAP: 16  PIP: 42    Mechanical ventilator status assessed & settings reviewed  Continue bronchodilators, pulmonary toilet  Head of bed elevation to 30-40 degrees    ====================CARDIOVASCULAR=====================  Continue hemodynamic monitoring/ telemetry  Not on any pressors  Continue cardiovascular / antihypertensive medications    ===================== RENAL ============================  Continue LR 30CC/hr      D/C IVF  Monitor I/Os, BUN/ Cr  and electrolytes  D/C Pickens      Keep Pickens  BPH: Continue Flomax/ Finasteride      ==================== GASTROINTESTINAL===================  On regular diet, tolerating well  Continue GI prophylaxis with Pepcid / Protonix  Continue Zofran / Reglan for nausea - PRN	  NPO    =======================    ENDOCRIN  =====================  Glycemic monitoring  F/S with coverage  ===================HEMATOLOGIC/ONCOLOGIC =============  Monitor chest tube output. No signs of active bleeding.   Follow CBC, coags  in AM  DVT prophylaxis with SCD, sc Heparin    ========================INFECTIOUS DISEASE===============  No signs of infection. Monitor for fever / leukocytosis.  All surgical incision / chest tube  sites look clean  D/C [place]      Pertinent clinical, laboratory, radiographic, hemodynamic, echocardiographic, respiratory data, microbiologic data and chart were reviewed and analyzed frequently throughout the course of the day and night. GI and DVT prophylaxis, glycemic control, head of bed elevation and skin care issues were addressed.  Patient seen, examined and plan discussed with CT Surgery / CTICU team during rounds.         I have spent               minutes of critical care time with this pt between            am/pm    and               am/ pm         minutes spent on total encounter; more than 50% of the visit was spent counseling and/or coordinating care by the attending physician.        KIM Robbins MD [facility]SC[facility]ACKO          MRN-[id number]    HPI  47 M PMH of sarcoidosis, HTN, COPD, and asthma who was initially admitted on 09/10 w cc of severe leg spasms X two days  In E.D. pt found to be wheezing, seen by Pulmonary and started on IV Steroids for sarcoidosis  On 9/12  Stroke codes were called as the patient became unresponsive.  He was transferred to MICU and then intubated for airway protection.   The patient was found to have a new infarct on MRI and also found to have a patent PFO .    There was concern patient was having paroxysmal afib-  was started on Eliquis. He has received 2 doses of Eliquis on 09/19. complicated by hemoptysis;  underwent Bronchoscopy with bronchoalveolar lavage with epi injection on 09/21 and was planned  to have  IR embolization for hemoptysis control in the next few days.  However, he developed hemoptysis again and underwent urgent DAVID lobectomy, complicated with post op bleeding.    Left VATS and DAVID lobectomy 9/24/17    Re-op L thoracotomy, evacuation of hemothorax (600cc clot, 400cc blood) no obvious site of bleeding  9/25/17  Re-op L thoracotomy, evacuation of hemothorax; RTOR x2 100cc clot; 200cc blood: bleeding posterior intercostal arteries identified and cauterized 9/25/17   10/23/17 bedside talc pleurodesis  Pt eventually discharged to rehab - readmitted on 11/09/17 with recurrent hemoptysis.     Procedure:   11/15/2017  Bronchoscopy with bronchoalveolar lavage  in OR        · Operative Findings	Bronchoscopy.  Blood identified in both the right and left airways, predominately on the left.  100cc of old blood suctioned.  Airways flushed and suctioned; no identification of active bleeding.  Bronchoalveolar lavage performed and specimen sent. 	                      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 Intubated, paralysed, sedated. Telemetry - with intermittent sinus tachycardia / with elevated BP treated with 10 iv Cardizem x 2 today.        PAST MEDICAL & SURGICAL HISTORY:  History of pneumothorax: History of 3 prior pneumonthoracies.  COPD (chronic obstructive pulmonary disease)  Asthma  Hypertension  Sarcoidosis  History of thoracotomy: 9/24/17 Left thoracotomy, Left upper lobectomy   9/25/17 Reop left thoracotomy, evacuation of left hemothorax    Allergies  No Known Allergies        ***VITAL SIGNS:  Vital Signs Last 24 Hrs  T(C): 35.8 (16 Nov 2017 08:00), Max: 38.1 (15 Nov 2017 20:00)  T(F): 96.5 (16 Nov 2017 08:00), Max: 100.6 (15 Nov 2017 20:00)  HR: 71 (16 Nov 2017 19:00) (44 - 107)  BP: 156/121 (16 Nov 2017 04:10) (86/68 - 169/117)  BP(mean): 129 (16 Nov 2017 04:10) (71 - 129)  RR: 20 (16 Nov 2017 19:00) (14 - 24)  SpO2: 100% (16 Nov 2017 19:00) (95% - 100%)    I/Os:   I&O's Detail    15 Nov 2017 07:01  -  16 Nov 2017 07:00  --------------------------------------------------------  IN:    cisatracurium Infusion: 148.3 mL    dexmedetomidine Infusion: 266.3 mL    fentaNYL  Infusion: 127.5 mL    IV PiggyBack: 1625 mL    Lactated Ringers IV Bolus: 500 mL    lactated ringers.: 75 mL    phenylephrine   Infusion: 140 mL    phenylephrine   Infusion: 142.2 mL    phenylephrine   Infusion: 59.1 mL    sodium chloride 0.9%: 975 mL    sodium chloride 0.9%.: 180 mL  Total IN: 4238.3 mL    OUT:    Indwelling Catheter - Urethral: 755 mL    Voided: 310 mL  Total OUT: 1065 mL    Total NET: 3173.3 mL      16 Nov 2017 07:01  -  16 Nov 2017 19:15  --------------------------------------------------------  IN:    cisatracurium Infusion: 279 mL    dexmedetomidine Infusion: 225 mL    fentaNYL  Infusion: 236.3 mL    phenylephrine   Infusion: 5.6 mL    sodium chloride 0.9%.: 610 mL  Total IN: 1355.9 mL    OUT:    Indwelling Catheter - Urethral: 1190 mL  Total OUT: 1190 mL    Total NET: 165.9 mL        =======================  MEDICATIONS  ===================  MEDICATIONS  (STANDING):    atorvastatin 80 milliGRAM(s) Oral at bedtime  benzonatate 100 milliGRAM(s) Oral three times a day  buDESOnide 160 MICROgram(s)/formoterol 4.5 MICROgram(s) Inhaler 2 Puff(s) Inhalation two times a day  cisatracurium Infusion 2 MICROgram(s)/kG/Min (9 mL/Hr) IV Continuous  dexmedetomidine Infusion 0.3 MICROgram(s)/kG/Hr (5.625 mL/Hr) IV Continuous   diltiazem Injectable 10 milliGRAM(s) IV Push once  diVALproex  milliGRAM(s) Oral two times a day  docusate sodium 100 milliGRAM(s) Oral two times a day  fentaNYL   Infusion 1 MICROgram(s)/kG/Hr (7.5 mL/Hr) IV Continuous   ferrous    sulfate 325 milliGRAM(s) Oral three times a day with meals  folic acid 1 milliGRAM(s) Oral daily  ipratropium 17 MICROgram(s) HFA Inhaler 1 Puff(s) Inhalation every 6 hours  lidocaine   Patch 1 Patch Transdermal daily  magnesium sulfate  IVPB 2 Gram(s) IV Intermittent once  methylPREDNISolone sodium succinate Injectable 20 milliGRAM(s) IV Push every 8 hours  pantoprazole  Injectable 40 milliGRAM(s) IV Push daily  phenylephrine    Infusion 0.3 MICROgram(s)/kG/Min (8.438 mL/Hr) IV Continuous   piperacillin/tazobactam IVPB. 3.375 Gram(s) IV Intermittent every 8 hours  polyethylene glycol 3350 17 Gram(s) Oral at bedtime  sodium chloride 0.9% Bolus 250 milliLiter(s) IV Bolus once  sodium chloride 0.9%. 1000 milliLiter(s) (30 mL/Hr) IV Continuous   vancomycin  IVPB 1000 milliGRAM(s) IV Intermittent every 12 hours  voriconazole IVPB 300 milliGRAM(s) IV Intermittent every 12 hours    MEDICATIONS  (PRN):  ALBUTerol    90 MICROgram(s) HFA Inhaler 2 Puff(s) Inhalation every 6 hours    ======================VENTILATOR SETTINGS  ==============  Mode: AC/ CMV (Assist Control/ Continuous Mandatory Ventilation)  RR (machine): 20  TV (machine): 350  FiO2: 50  PEEP: 5  MAP: 16  PIP: 42      =================== PATIENT CARE ACCESS DEVICES ==========  Peripheral IV (+)   Arterial Line	R radial (+) 				  Urinary Catheter (+)   Necessity of urinary, arterial, and venous catheters discussed    ======================= PHYSICAL EXAM===================  General:             intubated  Neuro:               sedated, paralysed        	  HEENT:              GIOVANI/ ETT- with residual bloody secretions on suctioning  Respiratory:	Lungs sound coarse on auscultation bilaterally .                          Scattered rales, rhonchi, no wheezing.   CV:		Regular rate and rhythm. Normal S1/S2, tachycardia on/off  Abdomen:         Soft,  nontender, not-distended. Bowel sounds present   Skin:		No rash.  Extremities:	Warm, no cyanosis or edema.  Palpable pulses    ============================ LABS =======================                        9.1    8.92  )-----------( 73       ( 16 Nov 2017 16:00 )             28.6     11-16    133<L>  |  91<L>  |  16  --------------------------------<  219<H>  5.0        |  30         |  0.71    Ca    8.6      16 Nov 2017 16:00  Phos  6.0     11-16  Mg     1.5     11-16    TPro  6.2  /  Alb  3.1<L>  /  TBili  0.2  /  DBili  x   /  AST  8   /  ALT  7   /  AlkPhos  62  11-16           PT/INR - ( 16 Nov 2017 16:00 )   PT: 14.4 SEC;   INR: 1.28    PTT:26.1 SEC    ABG - ( 16 Nov 2017 16:00 ) pH: 7.37  /  pCO2: 60    /  pO2: 342   / HCO3: 32    / Base Excess: 8.5   /  SaO2: 100.0         BRONCHIAL LAVAGE  11-15-17 --  --  --      BLOOD  11-15-17 --  --  --      SPUTUM  11-14-17 --  --  --      URINE MIDSTREAM  11-13-17 --  --  --      BLOOD PERIPHERAL  11-13-17 --  --  --        ===================== IMAGING STUDIES ===================  Radiology personally reviewed.    < from: Xray Chest 1 View AP -PORTABLE-Routine (11.16.17 @ 06:32) >    PROCEDURE DATE:  Nov 15 2017   INTERPRETATION:  TIME OF EXAM: November 15, 2017 at 6:20 PM; 10:33 PM;   November 16 at 6:12 AM  CLINICAL INFORMATION: Post bronchoscopy and intubated.; Left lung opacity.    INTERPRETATION:     November 15:     6:20 PM:  Since the last study, an endotracheal tube is been placed and is in   satisfactory position. Severe emphysematous changes bilaterally with   increasing left perihilar opacity likely due to compressed lung from   pneumothorax and/or cystic changes in the left lung. Right lung shows no   change.    8:34 PM:  Similar findings since the last exam. Large loculated left pneumothorax   suspected although cystic change could account for this finding. No   alteration in the appearance of the endotracheal tube. Central opacity on   the left side likely compressed lung.    November 16:  6:12 AM:  Endotracheal tube unchanged. Catheter traverses the trachea with its tip   in the region of the left lower lobe bronchus. No change in the   persistent cystic alterations in the left upper lobe representing   loculated pneumothorax or large bulla. Left lung base and right lung   unchanged. Heart is not enlarged.      COMPARISON:  November 14    IMPRESSION:  Follow-up post intubation and on most recent exam catheter   in the left lower lobe bronchus as well as stable loculated left-sided   pneumothorax or large bulla.    < end of copied text >  ====================ASSESSMENT AND PLAN ================    47 M PMH of sarcoidosis, aspergilloma, DAVID lobectomy, HTN, COPD, and asthma readmitted with recurrent hemoptysis   Now is s/p 11/15/2017  Bronchoscopy with bronchoalveolar lavage  in OR        · Operative Findings	Bronchoscopy.  Blood identified in both the right and left airways, predominately on the left.  100cc of old blood suctioned.  Airways flushed and suctioned; no identification of active bleeding.  Bronchoalveolar lavage performed and specimen sent. 	                      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              Sarcoidosis               PFO on Eliquis ( currently off)              Anemia, thrombocytopenia ( chronic/ multifactorial)  CVA  with L-side weakness        ====================== NEUROLOGY=======================  Pain control with Fentanyl drip,   Pt is sedated with Versed; Paralysed with Nimbex ( train of 4 to monitor)  Precedex d/carmen    ==================== RESPIRATORY========================  Mechanical Ventilation:  Mode: AC/ CMV (Assist Control/ Continuous Mandatory Ventilation)  RR (machine): 20  TV (machine): 350  FiO2: 50  PEEP: 5  MAP: 16  PIP: 42    Mechanical ventilator status assessed & settings reviewed  Continue bronchodilators, pulmonary toilet  Head of bed elevation to 30-40 degrees  ETT suctioning as needed  Assesment for vent weaning in AM if resp status stable and bleeding resolved   Pulm f/up   ====================CARDIOVASCULAR=====================  Continue hemodynamic monitoring/ telemetry  Phenylephrine for BP control while on sedation - as indicated for SBP > 100, MAP > 65    ===================== RENAL ============================  Continue NS 50 /h  Monitor I/Os, BUN/ Cr  and electrolytes  Keep [place] for  UO monitoring    ==================== GASTROINTESTINAL===================  NPO  Continue GI prophylaxis with  Protonix  Continue Zofran / Reglan for nausea - PRN	      =======================    ENDOCRIN  =====================  Glycemic monitoring  F/S with coverage q6  Solumedrol 20 mg q 8 for sarcoidosis/COPD    ===================HEMATOLOGIC/ONCOLOGIC =============  No signs of active bleeding at thia point. ETT bloody secretions -  old blood on suctioning  Follow CBC, coags  in AM  DVT prophylaxis with SCD,     ========================INFECTIOUS DISEASE===============  No signs of new  infection. Monitor for fever / leukocytosis.  Abx  :  Vanco, Voriconazole   ID f/up      Pertinent clinical, laboratory, radiographic, hemodynamic, echocardiographic, respiratory data, microbiologic data and chart were reviewed and analyzed frequently throughout the course of the day and night. GI and DVT prophylaxis, glycemic control, head of bed elevation and skin care issues were addressed.  Patient seen, examined and plan discussed with CT Surgery / CTICU team during rounds.         I have spent   75   minutes of critical care time with this pt between   7   am    and  9 pm         minutes spent on total encounter; more than 50% of the visit was spent counseling and/or coordinating care by the attending physician.        KIM Robbins MD [facility]          MRN-[id number]    HPI  47 M PMH of sarcoidosis, HTN, COPD, and asthma who was initially admitted on 09/10 w cc of severe leg spasms X two days  In E.D. pt found to be wheezing, seen by Pulmonary and started on IV Steroids for sarcoidosis  On 9/12  Stroke codes were called as the patient became unresponsive.  He was transferred to MICU and then intubated for airway protection.   The patient was found to have a new infarct on MRI and also found to have a patent PFO .    There was concern patient was having paroxysmal afib-  was started on Eliquis. He has received 2 doses of Eliquis on 09/19. complicated by hemoptysis;  underwent Bronchoscopy with bronchoalveolar lavage with epi injection on 09/21 and was planned  to have  IR embolization for hemoptysis control in the next few days.  However, he developed hemoptysis again and underwent urgent DAVID lobectomy, complicated with post op bleeding.    Left VATS and DAVID lobectomy 9/24/17    Re-op L thoracotomy, evacuation of hemothorax (600cc clot, 400cc blood) no obvious site of bleeding  9/25/17  Re-op L thoracotomy, evacuation of hemothorax; RTOR x2 100cc clot; 200cc blood: bleeding posterior intercostal arteries identified and cauterized 9/25/17   10/23/17 bedside talc pleurodesis  Pt eventually discharged to rehab - readmitted on 11/09/17 with recurrent hemoptysis.     Procedure:   11/15/2017  Bronchoscopy with bronchoalveolar lavage  in OR        · Operative Findings	Bronchoscopy.  Blood identified in both the right and left airways, predominately on the left.  100cc of old blood suctioned.  Airways flushed and suctioned; no identification of active bleeding.  Bronchoalveolar lavage performed and specimen sent. 	                      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 Intubated, paralysed, sedated. Telemetry - with intermittent sinus tachycardia / with elevated BP treated with 10 iv Cardizem x 2 today.        PAST MEDICAL & SURGICAL HISTORY:  History of pneumothorax: History of 3 prior pneumonthoracies.  COPD (chronic obstructive pulmonary disease)  Asthma  Hypertension  Sarcoidosis  History of thoracotomy: 9/24/17 Left thoracotomy, Left upper lobectomy   9/25/17 Reop left thoracotomy, evacuation of left hemothorax    Allergies  No Known Allergies        ***VITAL SIGNS:  Vital Signs Last 24 Hrs  T(C): 35.8 (16 Nov 2017 08:00), Max: 38.1 (15 Nov 2017 20:00)  T(F): 96.5 (16 Nov 2017 08:00), Max: 100.6 (15 Nov 2017 20:00)  HR: 71 (16 Nov 2017 19:00) (44 - 107)  BP: 156/121 (16 Nov 2017 04:10) (86/68 - 169/117)  BP(mean): 129 (16 Nov 2017 04:10) (71 - 129)  RR: 20 (16 Nov 2017 19:00) (14 - 24)  SpO2: 100% (16 Nov 2017 19:00) (95% - 100%)    I/Os:   I&O's Detail    15 Nov 2017 07:01  -  16 Nov 2017 07:00  --------------------------------------------------------  IN:    cisatracurium Infusion: 148.3 mL    dexmedetomidine Infusion: 266.3 mL    fentaNYL  Infusion: 127.5 mL    IV PiggyBack: 1625 mL    Lactated Ringers IV Bolus: 500 mL    lactated ringers.: 75 mL    phenylephrine   Infusion: 140 mL    phenylephrine   Infusion: 142.2 mL    phenylephrine   Infusion: 59.1 mL    sodium chloride 0.9%: 975 mL    sodium chloride 0.9%.: 180 mL  Total IN: 4238.3 mL    OUT:    Indwelling Catheter - Urethral: 755 mL    Voided: 310 mL  Total OUT: 1065 mL    Total NET: 3173.3 mL      16 Nov 2017 07:01  -  16 Nov 2017 19:15  --------------------------------------------------------  IN:    cisatracurium Infusion: 279 mL    dexmedetomidine Infusion: 225 mL    fentaNYL  Infusion: 236.3 mL    phenylephrine   Infusion: 5.6 mL    sodium chloride 0.9%.: 610 mL  Total IN: 1355.9 mL    OUT:    Indwelling Catheter - Urethral: 1190 mL  Total OUT: 1190 mL    Total NET: 165.9 mL        =======================  MEDICATIONS  ===================  MEDICATIONS  (STANDING):    atorvastatin 80 milliGRAM(s) Oral at bedtime  benzonatate 100 milliGRAM(s) Oral three times a day  buDESOnide 160 MICROgram(s)/formoterol 4.5 MICROgram(s) Inhaler 2 Puff(s) Inhalation two times a day  cisatracurium Infusion 2 MICROgram(s)/kG/Min (9 mL/Hr) IV Continuous  dexmedetomidine Infusion 0.3 MICROgram(s)/kG/Hr (5.625 mL/Hr) IV Continuous   diltiazem Injectable 10 milliGRAM(s) IV Push once  diVALproex  milliGRAM(s) Oral two times a day  docusate sodium 100 milliGRAM(s) Oral two times a day  fentaNYL   Infusion 1 MICROgram(s)/kG/Hr (7.5 mL/Hr) IV Continuous   ferrous    sulfate 325 milliGRAM(s) Oral three times a day with meals  folic acid 1 milliGRAM(s) Oral daily  ipratropium 17 MICROgram(s) HFA Inhaler 1 Puff(s) Inhalation every 6 hours  lidocaine   Patch 1 Patch Transdermal daily  magnesium sulfate  IVPB 2 Gram(s) IV Intermittent once  methylPREDNISolone sodium succinate Injectable 20 milliGRAM(s) IV Push every 8 hours  pantoprazole  Injectable 40 milliGRAM(s) IV Push daily  phenylephrine    Infusion 0.3 MICROgram(s)/kG/Min (8.438 mL/Hr) IV Continuous   piperacillin/tazobactam IVPB. 3.375 Gram(s) IV Intermittent every 8 hours  polyethylene glycol 3350 17 Gram(s) Oral at bedtime  sodium chloride 0.9% Bolus 250 milliLiter(s) IV Bolus once  sodium chloride 0.9%. 1000 milliLiter(s) (30 mL/Hr) IV Continuous   vancomycin  IVPB 1000 milliGRAM(s) IV Intermittent every 12 hours  voriconazole IVPB 300 milliGRAM(s) IV Intermittent every 12 hours    MEDICATIONS  (PRN):  ALBUTerol    90 MICROgram(s) HFA Inhaler 2 Puff(s) Inhalation every 6 hours    ======================VENTILATOR SETTINGS  ==============  Mode: AC/ CMV (Assist Control/ Continuous Mandatory Ventilation)  RR (machine): 20  TV (machine): 350  FiO2: 50  PEEP: 5  MAP: 16  PIP: 42      =================== PATIENT CARE ACCESS DEVICES ==========  Peripheral IV (+)   Arterial Line	R radial (+) 				  Urinary Catheter (+)   Necessity of urinary, arterial, and venous catheters discussed    ======================= PHYSICAL EXAM===================  General:             intubated  Neuro:               sedated, paralysed        	  HEENT:              GIOVANI/ ETT- with residual bloody secretions on suctioning  Respiratory:	Lungs sound coarse on auscultation bilaterally .                          Scattered rales, rhonchi, no wheezing.   CV:		Regular rate and rhythm. Normal S1/S2, tachycardia on/off  Abdomen:         Soft,  nontender, not-distended. Bowel sounds present   Skin:		No rash.  Extremities:	Warm, no cyanosis or edema.  Palpable pulses    ============================ LABS =======================                        9.1    8.92  )-----------( 73       ( 16 Nov 2017 16:00 )             28.6     11-16    133<L>  |  91<L>  |  16  --------------------------------<  219<H>  5.0        |  30         |  0.71    Ca    8.6      16 Nov 2017 16:00  Phos  6.0     11-16  Mg     1.5     11-16    TPro  6.2  /  Alb  3.1<L>  /  TBili  0.2  /  DBili  x   /  AST  8   /  ALT  7   /  AlkPhos  62  11-16           PT/INR - ( 16 Nov 2017 16:00 )   PT: 14.4 SEC;   INR: 1.28    PTT:26.1 SEC    ABG - ( 16 Nov 2017 16:00 ) pH: 7.37  /  pCO2: 60    /  pO2: 342   / HCO3: 32    / Base Excess: 8.5   /  SaO2: 100.0         BRONCHIAL LAVAGE  11-15-17 --  --  --      BLOOD  11-15-17 --  --  --      SPUTUM  11-14-17 --  --  --      URINE MIDSTREAM  11-13-17 --  --  --      BLOOD PERIPHERAL  11-13-17 --  --  --        ===================== IMAGING STUDIES ===================  Radiology personally reviewed.    < from: Xray Chest 1 View AP -PORTABLE-Routine (11.16.17 @ 06:32) >    PROCEDURE DATE:  Nov 15 2017   INTERPRETATION:  TIME OF EXAM: November 15, 2017 at 6:20 PM; 10:33 PM;   November 16 at 6:12 AM  CLINICAL INFORMATION: Post bronchoscopy and intubated.; Left lung opacity.    INTERPRETATION:     November 15:     6:20 PM:  Since the last study, an endotracheal tube is been placed and is in   satisfactory position. Severe emphysematous changes bilaterally with   increasing left perihilar opacity likely due to compressed lung from   pneumothorax and/or cystic changes in the left lung. Right lung shows no   change.    8:34 PM:  Similar findings since the last exam. Large loculated left pneumothorax   suspected although cystic change could account for this finding. No   alteration in the appearance of the endotracheal tube. Central opacity on   the left side likely compressed lung.    November 16:  6:12 AM:  Endotracheal tube unchanged. Catheter traverses the trachea with its tip   in the region of the left lower lobe bronchus. No change in the   persistent cystic alterations in the left upper lobe representing   loculated pneumothorax or large bulla. Left lung base and right lung   unchanged. Heart is not enlarged.      COMPARISON:  November 14    IMPRESSION:  Follow-up post intubation and on most recent exam catheter   in the left lower lobe bronchus as well as stable loculated left-sided   pneumothorax or large bulla.    < end of copied text >  ====================ASSESSMENT AND PLAN ================    47 M PMH of sarcoidosis, aspergilloma, DAVID lobectomy, HTN, COPD, and asthma readmitted with recurrent hemoptysis   Now is s/p 11/15/2017  Bronchoscopy with bronchoalveolar lavage  in OR        · Operative Findings	Bronchoscopy.  Blood identified in both the right and left airways, predominately on the left.  100cc of old blood suctioned.  Airways flushed and suctioned; no identification of active bleeding.  Bronchoalveolar lavage performed and specimen sent. 	                      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              Sarcoidosis               PFO on Eliquis ( currently off)              Anemia, thrombocytopenia ( chronic/ multifactorial)  CVA  with L-side weakness        ====================== NEUROLOGY=======================  Pain control with Fentanyl drip,   Pt is sedated with Versed; Paralysed with Nimbex ( train of 4 to monitor)  Precedex d/carmen    ==================== RESPIRATORY========================  Mechanical Ventilation:  Mode: AC/ CMV (Assist Control/ Continuous Mandatory Ventilation)  RR (machine): 20  TV (machine): 350  FiO2: 50  PEEP: 5  MAP: 16  PIP: 42    Mechanical ventilator status assessed & settings reviewed  Continue bronchodilators, pulmonary toilet  Head of bed elevation to 30-40 degrees  ETT suctioning as needed  Assesment for vent weaning in AM if resp status stable and bleeding resolved   Pulm f/up   ====================CARDIOVASCULAR=====================  Continue hemodynamic monitoring/ telemetry  Phenylephrine for BP control while on sedation - as indicated for SBP > 100, MAP > 65    ===================== RENAL ============================  Continue NS 50 /h  Monitor I/Os, BUN/ Cr  and electrolytes  Keep [place] for  UO monitoring    ==================== GASTROINTESTINAL===================  NPO  Continue GI prophylaxis with  Protonix  Continue Zofran / Reglan for nausea - PRN	      =======================    ENDOCRIN  =====================  Glycemic monitoring  F/S with coverage q6  Solumedrol 20 mg q 8 for sarcoidosis/COPD    ===================HEMATOLOGIC/ONCOLOGIC =============  No signs of active bleeding at thia point. ETT bloody secretions -  old blood on suctioning  Follow CBC, coags  in AM  DVT prophylaxis with SCD,     ========================INFECTIOUS DISEASE===============  No signs of new  infection. Monitor for fever / leukocytosis.  Abx  :  Zosyn, Vanco, Voriconazole   F/up blood and resp cultures from 11/15/17  ID f/up      Pertinent clinical, laboratory, radiographic, hemodynamic, echocardiographic, respiratory data, microbiologic data and chart were reviewed and analyzed frequently throughout the course of the day and night. GI and DVT prophylaxis, glycemic control, head of bed elevation and skin care issues were addressed.  Patient seen, examined and plan discussed with CT Surgery / CTICU team during rounds.         I have spent   75   minutes of critical care time with this pt between   7   am    and  9 pm         minutes spent on total encounter; more than 50% of the visit was spent counseling and/or coordinating care by the attending physician.        KIM Robbins MD

## 2017-11-16 NOTE — PROGRESS NOTE ADULT - ASSESSMENT
47 year old with sarcoidosis and COPD presents after recent andree surgery with recurrent hemoptysis.     At this time, he also has fever.    His CT has peribronchial changes.    Continue vanco/ zosyn/ voriconazole    await culture data from bronch

## 2017-11-16 NOTE — PROGRESS NOTE ADULT - PROBLEM SELECTOR PLAN 1
? 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 ot from bronchiectasis? eliquis have been dced: needs card to se: In addition, he may need IR help with embolization, it his hemoptysis increases: He has had pleurodesis on the left side.  11/10 quantify hemoptysis. no AC. stable  11/11 no more hemoptysis. off AC  11/12 resolved. off AC  11/13: today had hemoptysis again----->localize site---> IR embolization: left a message for thoracic team  11/14: DW Dr Caldera: for repeat bronchoscopy to localize the site of bleeding and need IR help to embolize the culprit vessel!  11/15: started on broad spectrum antibiotics and for bronchoscopy today  11/16: s/p IR emobolization done: monitor for more hemoptysis

## 2017-11-16 NOTE — PROGRESS NOTE ADULT - SUBJECTIVE AND OBJECTIVE BOX
POST ANESTHESIA EVALUATION    47y Male POSTOP DAY 1 S/P     MENTAL STATUS: Patient participation [  ] Awake     [  ] Arousable     [ X ] Sedated    AIRWAY PATENCY: [  ] Satisfactory  [ X ] Other: intubated    Vital Signs Last 24 Hrs  T(C): 35.8 (16 Nov 2017 04:00), Max: 38.1 (15 Nov 2017 20:00)  T(F): 96.4 (16 Nov 2017 04:00), Max: 100.6 (15 Nov 2017 20:00)  HR: 76 (16 Nov 2017 07:20) (44 - 117)  BP: 156/121 (16 Nov 2017 04:10) (86/68 - 169/117)  BP(mean): 129 (16 Nov 2017 04:10) (69 - 129)  RR: 20 (16 Nov 2017 07:00) (9 - 63)  SpO2: 100% (16 Nov 2017 07:20) (95% - 100%)  I&O's Summary    15 Nov 2017 07:01  -  16 Nov 2017 07:00  --------------------------------------------------------  IN: 4238.3 mL / OUT: 1065 mL / NET: 3173.3 mL          NAUSEA/ VOMITTING:  [ X ] NONE  [  ] CONTROLLED [  ] OTHER     PAIN: [X  ] CONTROLLED WITH CURRENT REGIMEN  [  ] OTHER    [ X ] NO APPARENT ANESTHESIA COMPLICATIONS      Comments: for return to OR now

## 2017-11-16 NOTE — CHART NOTE - NSCHARTNOTEFT_GEN_A_CORE
NUTRITION SERVICES                                                                                  MALNUTRITION ALERT     Attention Health Care Provider: Upon nutritional assessment by the Registered Dietitian your patient was determined to meet criteria / has evidence of the following diagnosis/diagnoses:    [ ] Mild Protein Calorie Malnutrition   [ ] Moderate Protein Calorie Malnutrition   [ x] Severe Protein Calorie Malnutrition   [ ] Unspecified Protein Calorie Malnutrition   [ ] Underweight / BMI <19  [ ] Morbid Obesity / BMI >40             By signing this assessment you are acknowledging the diagnosis/diagnoses.       PLAN OF CARE: Refer to Initial Dietitian Evaluation or Nutrition Follow-Up Documentation for Nutritional Recommendations.

## 2017-11-16 NOTE — PROGRESS NOTE ADULT - SUBJECTIVE AND OBJECTIVE BOX
Patient is a 47y old  Male who presents with a chief complaint of hemoptysis (09 Nov 2017 00:32)  patient is intubated  s/p IR intervention  for bronch today     Any change in ROS:     MEDICATIONS  (STANDING):  ALBUTerol    90 MICROgram(s) HFA Inhaler 2 Puff(s) Inhalation every 6 hours  atorvastatin 80 milliGRAM(s) Oral at bedtime  benzonatate 100 milliGRAM(s) Oral three times a day  buDESOnide 160 MICROgram(s)/formoterol 4.5 MICROgram(s) Inhaler 2 Puff(s) Inhalation two times a day  cisatracurium Infusion 2 MICROgram(s)/kG/Min (9 mL/Hr) IV Continuous <Continuous>  dexmedetomidine Infusion 0.3 MICROgram(s)/kG/Hr (5.625 mL/Hr) IV Continuous <Continuous>  diVALproex  milliGRAM(s) Oral two times a day  docusate sodium 100 milliGRAM(s) Oral two times a day  fentaNYL   Infusion 1 MICROgram(s)/kG/Hr (7.5 mL/Hr) IV Continuous <Continuous>  ferrous    sulfate 325 milliGRAM(s) Oral three times a day with meals  folic acid 1 milliGRAM(s) Oral daily  ipratropium 17 MICROgram(s) HFA Inhaler 1 Puff(s) Inhalation every 6 hours  lidocaine   Patch 1 Patch Transdermal daily  methylPREDNISolone sodium succinate Injectable 20 milliGRAM(s) IV Push every 8 hours  pantoprazole  Injectable 40 milliGRAM(s) IV Push daily  phenylephrine    Infusion 0.3 MICROgram(s)/kG/Min (8.438 mL/Hr) IV Continuous <Continuous>  piperacillin/tazobactam IVPB. 3.375 Gram(s) IV Intermittent every 8 hours  polyethylene glycol 3350 17 Gram(s) Oral at bedtime  sodium chloride 0.9%. 1000 milliLiter(s) (30 mL/Hr) IV Continuous <Continuous>  vancomycin  IVPB 1000 milliGRAM(s) IV Intermittent every 12 hours  voriconazole IVPB 400 milliGRAM(s) IV Intermittent every 12 hours  voriconazole IVPB 300 milliGRAM(s) IV Intermittent every 12 hours    MEDICATIONS  (PRN):  oxyCODONE    IR 5 milliGRAM(s) Oral every 4 hours PRN Moderate Pain (4 - 6)    Vital Signs Last 24 Hrs  T(C): 35.8 (16 Nov 2017 04:00), Max: 38.1 (15 Nov 2017 20:00)  T(F): 96.4 (16 Nov 2017 04:00), Max: 100.6 (15 Nov 2017 20:00)  HR: 83 (16 Nov 2017 09:00) (44 - 113)  BP: 156/121 (16 Nov 2017 04:10) (86/68 - 169/117)  BP(mean): 129 (16 Nov 2017 04:10) (69 - 129)  RR: 20 (16 Nov 2017 09:00) (9 - 63)  SpO2: 100% (16 Nov 2017 09:00) (95% - 100%)  Mode: standby    I&O's Summary    15 Nov 2017 07:01  -  16 Nov 2017 07:00  --------------------------------------------------------  IN: 4238.3 mL / OUT: 1065 mL / NET: 3173.3 mL          Physical Exam:   GENERAL: NAD, well-groomed, well-developed  HEENT: GIOVANI/   Atraumatic, Normocephalic  ENMT: No tonsillar erythema, exudates, or enlargement; Moist mucous membranes, Good dentition, No lesions  NECK: Supple, No JVD, Normal thyroid  CHEST/LUNG: coarse rhonchi  CVS: Regular rate and rhythm; No murmurs, rubs, or gallops  GI: : Soft, Nontender, Nondistended; Bowel sounds present  NERVOUS SYSTEM:  Sedated and paralyzed  EXTREMITIES:  2+ Peripheral Pulses, No clubbing, cyanosis, or edema  LYMPH: No lymphadenopathy noted  SKIN: No rashes or lesions  ENDOCRINOLOGY: No Thyromegaly  PSYCH: sedated and paralyzed    Labs:  ABG - ( 16 Nov 2017 10:03 )  pH: 7.38  /  pCO2: 60    /  pO2: 111   / HCO3: 32    / Base Excess: 8.9   /  SaO2: 98.6                           9.5    15.20 )-----------( 89       ( 16 Nov 2017 02:25 )             29.5                         9.5    11.18 )-----------( 84       ( 15 Nov 2017 13:24 )             29.8                         10.0   11.41 )-----------( 94       ( 15 Nov 2017 09:00 )             32.2                         10.7   12.15 )-----------( 110      ( 14 Nov 2017 06:00 )             34.9                         11.2   13.41 )-----------( 124      ( 13 Nov 2017 11:29 )             36.1     11-16    135  |  92<L>  |  13  ----------------------------<  186<H>  4.9   |  33<H>  |  0.76  11-15    135  |  91<L>  |  16  ----------------------------<  129<H>  5.0   |  35<H>  |  0.67    Ca    9.1      16 Nov 2017 02:25  Ca    8.8      15 Nov 2017 09:00    TPro  6.4  /  Alb  3.2<L>  /  TBili  0.4  /  DBili  x   /  AST  9   /  ALT  11  /  AlkPhos  65  11-16    CAPILLARY BLOOD GLUCOSE          LIVER FUNCTIONS - ( 16 Nov 2017 02:25 )  Alb: 3.2 g/dL / Pro: 6.4 g/dL / ALK PHOS: 65 u/L / ALT: 11 u/L / AST: 9 u/L / GGT: x           PT/INR - ( 16 Nov 2017 02:25 )   PT: 14.7 SEC;   INR: 1.31          PTT - ( 16 Nov 2017 02:25 )  PTT:25.6 SEC    Cultures:           Wound culture:                11-15 @ 16:22  Organism --  Culture w/ gram stain --  Specimen Source BRONCHIAL LAVAGE    Wound culture:                11-15 @ 09:22  Organism --  Culture w/ gram stain --  Specimen Source BLOOD    Wound culture:                11-14 @ 00:44  Organism --  Culture w/ gram stain --  Specimen Source SPUTUM    Wound culture:                11-13 @ 13:26  Organism --  Culture w/ gram stain --  Specimen Source URINE MIDSTREAM    Wound culture:                11-13 @ 13:20  Organism --  Culture w/ gram stain --  Specimen Source BLOOD PERIPHERAL      Abscess culture:             11-15 @ 16:22  Organism --  Gram Stain --  Specimen Source BRONCHIAL LAVAGE    Abscess culture:             11-15 @ 09:22  Organism --  Gram Stain --  Specimen Source BLOOD    Abscess culture:             11-14 @ 00:44  Organism --  Gram Stain --  Specimen Source SPUTUM    Abscess culture:             11-13 @ 13:26  Organism --  Gram Stain --  Specimen Source URINE MIDSTREAM    Abscess culture:             11-13 @ 13:20  Organism --  Gram Stain --  Specimen Source BLOOD PERIPHERAL        Tissue culture:           11-15 @ 16:22  Organism --  Gram Stain --  Specimen Source BRONCHIAL LAVAGE    Tissue culture:           11-15 @ 09:22  Organism --  Gram Stain --  Specimen Source BLOOD    Tissue culture:           11-14 @ 00:44  Organism --  Gram Stain --  Specimen Source SPUTUM    Tissue culture:           11-13 @ 13:26  Organism --  Gram Stain --  Specimen Source URINE MIDSTREAM    Tissue culture:           11-13 @ 13:20  Organism --  Gram Stain --  Specimen Source BLOOD PERIPHERAL      Body Fluid Smear & Culture:                        11-15 @ 16:22  AFB Smear  --  Culture Acid Fast Body Fluid w/ Smear  --  Culture Acid Fast Smear Concentrated     AFB SMEAR= NO ACID FAST BACILLI SEEN    Culture Results:     --  Specimen Source BRONCHIAL LAVAGE    Body Fluid Smear & Culture:                        11-15 @ 09:22  AFB Smear  --  Culture Acid Fast Body Fluid w/ Smear  --  Culture Acid Fast Smear Concentrated   --    Culture Results:     --  Specimen Source BLOOD    Body Fluid Smear & Culture:                        11-14 @ 00:44  AFB Smear  --  Culture Acid Fast Body Fluid w/ Smear  --  Culture Acid Fast Smear Concentrated   --    Culture Results:     --  Specimen Source SPUTUM    Body Fluid Smear & Culture:                        11-13 @ 13:26  AFB Smear  --  Culture Acid Fast Body Fluid w/ Smear  --  Culture Acid Fast Smear Concentrated   --    Culture Results:     --  Specimen Source URINE MIDSTREAM    Body Fluid Smear & Culture:                        11-13 @ 13:20  AFB Smear  --  Culture Acid Fast Body Fluid w/ Smear  --  Culture Acid Fast Smear Concentrated   --    Culture Results:     --  Specimen Source BLOOD PERIPHERAL        Rapid Respiratory Viral Panel Result        11-13 @ 15:46  Rapid RVP --  Coronovirus --  Adenovirus NOT DETECTED  Bordetella Pertussis NOT DETECTED  Chlamydia Pneumonia NOT DETECTED  Entero/RhinovirusNOT DETECTED  HKU1 Coronovirus --  HMPV Coronovirus NOT DETECTED  Influenza A NOT DETECTED (any subtype)  Influenza AH1 NOT DETECTED  Influenza AH1 2009 NOT DETECTED  Influenza AH3 NOT DETECTED  Influenza B NOT DETECTED  Mycoplasma Pneumoniae NOT DETECTED This nucleic acid amplification assay was performed using  the [employer] Film[employer]. The following pathogens are tested  for: Adenovirus, Coronavirus 229E, Coronavirus HKU1,  Coronavirus NL63, Coronavirus OC43, Human Metapneumovirus  (HMPV), Rhinovirus/Enterovirus, Influenza A H1, Influenza A  H1 2009 (Pandemic H1 2009), Influenza A H3, Influenza A (Flu  A) subtype not identified, Influenza B, Parainfluenza Virus  (types 1, 2, 3, 4), Respiratory Syncytial Virus (RSV),  Bordetella pertussis, Chlamydophila pneumoniae, and  Mycoplasma pneumoniae. A negative FilmArray result does not  always exclude the possibility of viral or bacterial  infection. Laboratory results should always be interpreted  in the context of clinical findings.  NL63 Coronovirus --  OC43 Coronovirus --  Parainfluenza 1 NOT DETECTED  Parainfluenza 2 NOT DETECTED  Parainfluenza 3 NOT DETECTED  Parainfluenza 4 NOT DETECTED  Resp Syncytial Virus NOT DETECTED      < from: Xray Chest 1 View AP -PORTABLE-Routine (11.16.17 @ 06:32) >  lar findings since the last exam. Large loculated left pneumothorax   suspected although cystic change could account for this finding. No   alteration in the appearance of the endotracheal tube. Central opacity on   the left side likely compressed lung.    November 16:  6:12 AM:  Endotracheal tube unchanged. Catheter traverses the trachea with its tip   in the region of the left lower lobe bronchus. No change in the   persistent cystic alterations in the left upper lobe representing   loculated pneumothorax or large bulla. Left lung base and right lung   unchanged. Heart is not enlarged.      COMPARISON:  November 14      IMPRESSION:  Follow-up post intubation and on most recent exam catheter   in the left lower lobe bronchus as well as stable loculated left-sided   pneumothorax or large bulla.                  RANDALL AMADOR M.D.; ATTENDING RADIOLOGIST  This document has been electronically signed. Nov 16 2017 10:42AM        < end of copied text >      Studies  Chest X-RAY  CT SCAN Chest   Venous Dopplers: LE:   CT Abdomen  Others

## 2017-11-16 NOTE — PROGRESS NOTE ADULT - ATTENDING COMMENTS
No hemoptysis in last 24 hours: However toughest decision would be to whether to reinitiate AC or not?    11/13: Difficult situation: having hemoptysis but needs AC, which can not be started!! HAS pfo TOO AS WELL AS paf : : :   HAD LEFT UPPER LOBECTOMY FOR HEMOPTYSIS with extensive sarcoid fibrosis of lungs: with postoperative course pretty prolonged complicated by persistent ptx!: Had blood patch as well as talc pleurodesis!! Now with ptx and hemoptysis again:  11/14: Needs repeat bronch and IR embolization given continued hemoptysis  11/15: septic with increased wheezing and mild hypercarbic respiratory failure: mild today: on steroids:  and broad spectrum antibiotics:   CANDIDATE FOR LUNG TRANSPLANT??: JEREMY CRUZ WILL DECIDE AFTER THE bronchoscopy!  11/16: S/P IR embolization : decrease steroids : for possible bronch today: on broad spectrum antibiotics: ?wean

## 2017-11-16 NOTE — PROGRESS NOTE ADULT - PROBLEM SELECTOR PLAN 2
Cont BD with inhaled steroids and oral steroids  11/114: Pts wheezing has significantly improved: he will need long term steroids as he starts wheezing once his steroids are dced!  11/15: Wheezing has increased: started on IV steroids high dose by thoracic team: Would suggest to decrease to 20 mg three times a day !  11/16: decrease steroids today to 20 mg three times day

## 2017-11-17 DIAGNOSIS — J96.90 RESPIRATORY FAILURE, UNSPECIFIED, UNSPECIFIED WHETHER WITH HYPOXIA OR HYPERCAPNIA: ICD-10-CM

## 2017-11-17 LAB
ALBUMIN SERPL ELPH-MCNC: 2.8 G/DL — LOW (ref 3.3–5)
ALP SERPL-CCNC: 57 U/L — SIGNIFICANT CHANGE UP (ref 40–120)
ALT FLD-CCNC: 9 U/L — SIGNIFICANT CHANGE UP (ref 4–41)
APTT BLD: 24.1 SEC — LOW (ref 27.5–37.4)
AST SERPL-CCNC: 9 U/L — SIGNIFICANT CHANGE UP (ref 4–40)
BASE EXCESS BLDA CALC-SCNC: 11.3 MMOL/L — SIGNIFICANT CHANGE UP
BILIRUB SERPL-MCNC: 0.2 MG/DL — SIGNIFICANT CHANGE UP (ref 0.2–1.2)
BUN SERPL-MCNC: 13 MG/DL — SIGNIFICANT CHANGE UP (ref 7–23)
CA-I BLDA-SCNC: 1.15 MMOL/L — SIGNIFICANT CHANGE UP (ref 1.15–1.29)
CALCIUM SERPL-MCNC: 8.7 MG/DL — SIGNIFICANT CHANGE UP (ref 8.4–10.5)
CHLORIDE SERPL-SCNC: 93 MMOL/L — LOW (ref 98–107)
CO2 SERPL-SCNC: 32 MMOL/L — HIGH (ref 22–31)
CREAT SERPL-MCNC: 0.66 MG/DL — SIGNIFICANT CHANGE UP (ref 0.5–1.3)
GLUCOSE BLDA-MCNC: 156 MG/DL — HIGH (ref 70–99)
GLUCOSE SERPL-MCNC: 159 MG/DL — HIGH (ref 70–99)
HCO3 BLDA-SCNC: 35 MMOL/L — HIGH (ref 22–26)
HCT VFR BLD CALC: 25.8 % — LOW (ref 39–50)
HCT VFR BLDA CALC: 26.9 % — LOW (ref 39–51)
HGB BLD-MCNC: 8.4 G/DL — LOW (ref 13–17)
HGB BLDA-MCNC: 8.7 G/DL — LOW (ref 13–17)
INR BLD: 1.22 — HIGH (ref 0.88–1.17)
LACTATE BLDA-SCNC: 1.4 MMOL/L — SIGNIFICANT CHANGE UP (ref 0.5–2)
MAGNESIUM SERPL-MCNC: 1.9 MG/DL — SIGNIFICANT CHANGE UP (ref 1.6–2.6)
MCHC RBC-ENTMCNC: 31.3 PG — SIGNIFICANT CHANGE UP (ref 27–34)
MCHC RBC-ENTMCNC: 32.6 % — SIGNIFICANT CHANGE UP (ref 32–36)
MCV RBC AUTO: 96.3 FL — SIGNIFICANT CHANGE UP (ref 80–100)
NRBC # FLD: 0 — SIGNIFICANT CHANGE UP
PCO2 BLDA: 55 MMHG — HIGH (ref 35–48)
PH BLDA: 7.43 PH — SIGNIFICANT CHANGE UP (ref 7.35–7.45)
PHOSPHATE SERPL-MCNC: 3.5 MG/DL — SIGNIFICANT CHANGE UP (ref 2.5–4.5)
PLATELET # BLD AUTO: 77 K/UL — LOW (ref 150–400)
PMV BLD: 14.4 FL — HIGH (ref 7–13)
PO2 BLDA: 175 MMHG — HIGH (ref 83–108)
POTASSIUM BLDA-SCNC: 4.4 MMOL/L — SIGNIFICANT CHANGE UP (ref 3.4–4.5)
POTASSIUM SERPL-MCNC: 4.9 MMOL/L — SIGNIFICANT CHANGE UP (ref 3.5–5.3)
POTASSIUM SERPL-SCNC: 4.9 MMOL/L — SIGNIFICANT CHANGE UP (ref 3.5–5.3)
PROT SERPL-MCNC: 5.8 G/DL — LOW (ref 6–8.3)
PROTHROM AB SERPL-ACNC: 13.7 SEC — HIGH (ref 9.8–13.1)
RBC # BLD: 2.68 M/UL — LOW (ref 4.2–5.8)
RBC # FLD: 14.3 % — SIGNIFICANT CHANGE UP (ref 10.3–14.5)
SAO2 % BLDA: 99.8 % — HIGH (ref 95–99)
SODIUM BLDA-SCNC: 127 MMOL/L — LOW (ref 136–146)
SODIUM SERPL-SCNC: 133 MMOL/L — LOW (ref 135–145)
SPECIMEN SOURCE: SIGNIFICANT CHANGE UP
VANCOMYCIN FLD-MCNC: 7.4 UG/ML — SIGNIFICANT CHANGE UP
WBC # BLD: 12.04 K/UL — HIGH (ref 3.8–10.5)
WBC # FLD AUTO: 12.04 K/UL — HIGH (ref 3.8–10.5)

## 2017-11-17 PROCEDURE — 99291 CRITICAL CARE FIRST HOUR: CPT

## 2017-11-17 PROCEDURE — 99292 CRITICAL CARE ADDL 30 MIN: CPT

## 2017-11-17 PROCEDURE — 99232 SBSQ HOSP IP/OBS MODERATE 35: CPT

## 2017-11-17 PROCEDURE — 99231 SBSQ HOSP IP/OBS SF/LOW 25: CPT

## 2017-11-17 PROCEDURE — 71010: CPT | Mod: 26

## 2017-11-17 RX ORDER — ACETAMINOPHEN 500 MG
1000 TABLET ORAL ONCE
Qty: 0 | Refills: 0 | Status: COMPLETED | OUTPATIENT
Start: 2017-11-17 | End: 2017-11-17

## 2017-11-17 RX ORDER — CALCIUM GLUCONATE 100 MG/ML
1 VIAL (ML) INTRAVENOUS ONCE
Qty: 0 | Refills: 0 | Status: COMPLETED | OUTPATIENT
Start: 2017-11-17 | End: 2017-11-17

## 2017-11-17 RX ORDER — ACETAMINOPHEN 500 MG
1000 TABLET ORAL ONCE
Qty: 0 | Refills: 0 | Status: DISCONTINUED | OUTPATIENT
Start: 2017-11-17 | End: 2017-11-26

## 2017-11-17 RX ADMIN — PHENYLEPHRINE HYDROCHLORIDE 2.81 MICROGRAM(S)/KG/MIN: 10 INJECTION INTRAVENOUS at 20:43

## 2017-11-17 RX ADMIN — VORICONAZOLE 125 MILLIGRAM(S): 10 INJECTION, POWDER, LYOPHILIZED, FOR SOLUTION INTRAVENOUS at 12:00

## 2017-11-17 RX ADMIN — Medication 250 MILLIGRAM(S): at 23:05

## 2017-11-17 RX ADMIN — PHENYLEPHRINE HYDROCHLORIDE 2.81 MICROGRAM(S)/KG/MIN: 10 INJECTION INTRAVENOUS at 00:37

## 2017-11-17 RX ADMIN — PIPERACILLIN AND TAZOBACTAM 25 GRAM(S): 4; .5 INJECTION, POWDER, LYOPHILIZED, FOR SOLUTION INTRAVENOUS at 02:29

## 2017-11-17 RX ADMIN — PIPERACILLIN AND TAZOBACTAM 25 GRAM(S): 4; .5 INJECTION, POWDER, LYOPHILIZED, FOR SOLUTION INTRAVENOUS at 12:40

## 2017-11-17 RX ADMIN — Medication 250 MILLIGRAM(S): at 11:30

## 2017-11-17 RX ADMIN — BUDESONIDE AND FORMOTEROL FUMARATE DIHYDRATE 2 PUFF(S): 160; 4.5 AEROSOL RESPIRATORY (INHALATION) at 09:44

## 2017-11-17 RX ADMIN — PANTOPRAZOLE SODIUM 40 MILLIGRAM(S): 20 TABLET, DELAYED RELEASE ORAL at 12:40

## 2017-11-17 RX ADMIN — BUDESONIDE AND FORMOTEROL FUMARATE DIHYDRATE 2 PUFF(S): 160; 4.5 AEROSOL RESPIRATORY (INHALATION) at 21:52

## 2017-11-17 RX ADMIN — PIPERACILLIN AND TAZOBACTAM 25 GRAM(S): 4; .5 INJECTION, POWDER, LYOPHILIZED, FOR SOLUTION INTRAVENOUS at 20:41

## 2017-11-17 RX ADMIN — FENTANYL CITRATE 7.5 MICROGRAM(S)/KG/HR: 50 INJECTION INTRAVENOUS at 20:43

## 2017-11-17 RX ADMIN — Medication 1000 MILLIGRAM(S): at 13:15

## 2017-11-17 RX ADMIN — Medication 1 PUFF(S): at 09:44

## 2017-11-17 RX ADMIN — Medication 20 MILLIGRAM(S): at 05:40

## 2017-11-17 RX ADMIN — Medication 1 PUFF(S): at 21:52

## 2017-11-17 RX ADMIN — Medication 20 MILLIGRAM(S): at 23:18

## 2017-11-17 RX ADMIN — Medication 20 MILLIGRAM(S): at 13:34

## 2017-11-17 RX ADMIN — Medication 400 MILLIGRAM(S): at 13:00

## 2017-11-17 RX ADMIN — SODIUM CHLORIDE 30 MILLILITER(S): 9 INJECTION INTRAMUSCULAR; INTRAVENOUS; SUBCUTANEOUS at 20:42

## 2017-11-17 RX ADMIN — Medication 1 PUFF(S): at 04:24

## 2017-11-17 RX ADMIN — DEXMEDETOMIDINE HYDROCHLORIDE IN 0.9% SODIUM CHLORIDE 5.62 MICROGRAM(S)/KG/HR: 4 INJECTION INTRAVENOUS at 20:42

## 2017-11-17 RX ADMIN — Medication 1 PUFF(S): at 15:15

## 2017-11-17 RX ADMIN — MIDAZOLAM HYDROCHLORIDE 5 MG/HR: 1 INJECTION, SOLUTION INTRAMUSCULAR; INTRAVENOUS at 20:42

## 2017-11-17 RX ADMIN — Medication 2: at 00:36

## 2017-11-17 RX ADMIN — Medication 200 GRAM(S): at 23:59

## 2017-11-17 NOTE — PROGRESS NOTE ADULT - PROBLEM SELECTOR PLAN 3
Cont BD with inhaled steroids and oral steroids  11/114: Pts wheezing has significantly improved: he will need long term steroids as he starts wheezing once his steroids are dced!  11/15: Wheezing has increased: started on IV steroids high dose by thoracic team: Would suggest to decrease to 20 mg three times a day !  11/16: decrease steroids today to 20 mg three times day  11/17: try to decrease steroids in AM

## 2017-11-17 NOTE — PROGRESS NOTE ADULT - SUBJECTIVE AND OBJECTIVE BOX
Follow Up:      Inverval History/ROS:Patient is a 47y old  Male who presents with a chief complaint of hemoptysis (09 Nov 2017 00:32)    S/p IR procedure yesterday.    S/p bronch today.    Afebrile.  Intubated.  On pressors.       Allergies    No Known Allergies    Intolerances        ANTIMICROBIALS:  piperacillin/tazobactam IVPB. 3.375 every 8 hours  vancomycin  IVPB 1000 every 12 hours  voriconazole IVPB 300 every 12 hours      OTHER MEDS:  acetaminophen  IVPB. 1000 milliGRAM(s) IV Intermittent once  ALBUTerol    90 MICROgram(s) HFA Inhaler 2 Puff(s) Inhalation every 6 hours PRN  atorvastatin 80 milliGRAM(s) Oral at bedtime  benzonatate 100 milliGRAM(s) Oral three times a day  buDESOnide 160 MICROgram(s)/formoterol 4.5 MICROgram(s) Inhaler 2 Puff(s) Inhalation two times a day  cisatracurium Infusion 2 MICROgram(s)/kG/Min IV Continuous <Continuous>  dexmedetomidine Infusion 0.3 MICROgram(s)/kG/Hr IV Continuous <Continuous>  dextrose 5%. 1000 milliLiter(s) IV Continuous <Continuous>  dextrose 50% Injectable 12.5 Gram(s) IV Push once  dextrose 50% Injectable 25 Gram(s) IV Push once  dextrose 50% Injectable 25 Gram(s) IV Push once  dextrose Gel 1 Dose(s) Oral once PRN  diVALproex  milliGRAM(s) Oral two times a day  docusate sodium 100 milliGRAM(s) Oral two times a day  fentaNYL   Infusion 1 MICROgram(s)/kG/Hr IV Continuous <Continuous>  ferrous    sulfate 325 milliGRAM(s) Oral three times a day with meals  folic acid 1 milliGRAM(s) Oral daily  glucagon  Injectable 1 milliGRAM(s) IntraMuscular once PRN  insulin lispro (HumaLOG) corrective regimen sliding scale   SubCutaneous every 6 hours  ipratropium 17 MICROgram(s) HFA Inhaler 1 Puff(s) Inhalation every 6 hours  lidocaine   Patch 1 Patch Transdermal daily  methylPREDNISolone sodium succinate Injectable 20 milliGRAM(s) IV Push every 8 hours  midazolam Infusion 5 mG/Hr IV Continuous <Continuous>  pantoprazole  Injectable 40 milliGRAM(s) IV Push daily  phenylephrine    Infusion 0.1 MICROgram(s)/kG/Min IV Continuous <Continuous>  polyethylene glycol 3350 17 Gram(s) Oral at bedtime  sodium chloride 0.9% Bolus 250 milliLiter(s) IV Bolus once  sodium chloride 0.9%. 1000 milliLiter(s) IV Continuous <Continuous>      Vital Signs Last 24 Hrs  T(C): 36.7 (17 Nov 2017 04:00), Max: 36.8 (17 Nov 2017 00:00)  T(F): 98 (17 Nov 2017 04:00), Max: 98.2 (17 Nov 2017 00:00)  HR: 61 (17 Nov 2017 11:07) (61 - 130)  BP: --  BP(mean): --  RR: 20 (17 Nov 2017 10:00) (20 - 20)  SpO2: 100% (17 Nov 2017 11:07) (100% - 100%)    PHYSICAL EXAM:  General: [ x] intubated  HEAD/EYES: [ ] PERRL [x ] white sclera [ ] icterus  ENT:  [ ] normal [ x] supple [ ] thrush [ ] pharyngeal exudate  Cardiovascular:   [ ] murmur [ ] normal [ ] PPM/AICD  Respiratory:  [x ] clear to ausculation bilaterally  GI:  [ x] soft, non-tender, normal bowel sounds  :  [ ] mock [ ] no CVA tenderness   Musculoskeletal:  [ ] no synovitis  Neurologic:  [ ] non-focal exam   Skin:  [ ] no rash  Lymph: [ ] no lymphadenopathy  Psychiatric:  [ ] appropriate affect [ ] alert & oriented  Lines:  [x ] no phlebitis [ ] central line                                8.4    12.04 )-----------( 77       ( 17 Nov 2017 03:50 )             25.8       11-17    133<L>  |  93<L>  |  13  ----------------------------<  159<H>  4.9   |  32<H>  |  0.66    Ca    8.7      17 Nov 2017 03:50  Phos  3.5     11-17  Mg     1.9     11-17    TPro  5.8<L>  /  Alb  2.8<L>  /  TBili  0.2  /  DBili  x   /  AST  9   /  ALT  9   /  AlkPhos  57  11-17          MICROBIOLOGY:Culture - Respiratory:   NO GROWTH - PRELIMINARY RESULTS  NRF^Normal Respiratory Katherine  QUANTITY OF GROWTH: RARE (11-15-17 @ 16:22)  Culture - Respiratory:   NRF^Normal Respiratory Katherine  QUANTITY OF GROWTH: FEW (11-14-17 @ 00:44)      Sputum AFB: smear negative  Culture; no pathogens  Fungal cx: pending  RADIOLOGY:

## 2017-11-17 NOTE — PROGRESS NOTE ADULT - ATTENDING COMMENTS
No hemoptysis in last 24 hours: However toughest decision would be to whether to reinitiate AC or not?    11/13: Difficult situation: having hemoptysis but needs AC, which can not be started!! HAS pfo TOO AS WELL AS paf : : :   HAD LEFT UPPER LOBECTOMY FOR HEMOPTYSIS with extensive sarcoid fibrosis of lungs: with postoperative course pretty prolonged complicated by persistent ptx!: Had blood patch as well as talc pleurodesis!! Now with ptx and hemoptysis again:  11/14: Needs repeat bronch and IR embolization given continued hemoptysis  11/15: septic with increased wheezing and mild hypercarbic respiratory failure: mild today: on steroids:  and broad spectrum antibiotics:   CANDIDATE FOR LUNG TRANSPLANT??: JEREMY CRUZ WILL DECIDE AFTER THE bronchoscopy!  11/16: S/P IR embolization : decrease steroids : for possible bronch today: on broad spectrum antibiotics: ?wean  : weaning attempts are being made: cont current care 11

## 2017-11-17 NOTE — PROGRESS NOTE ADULT - PROBLEM SELECTOR PLAN 2
? 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 ot from bronchiectasis? eliquis have been dced: needs card to se: In addition, he may need IR help with embolization, it his hemoptysis increases: He has had pleurodesis on the left side.  11/10 quantify hemoptysis. no AC. stable  11/11 no more hemoptysis. off AC  11/12 resolved. off AC  11/13: today had hemoptysis again----->localize site---> IR embolization: left a message for thoracic team  11/14: DW Dr Caldera: for repeat bronchoscopy to localize the site of bleeding and need IR help to embolize the culprit vessel!  11/15: started on broad spectrum antibiotics and for bronchoscopy today  11/16: s/p IR emobolization done: monitor for more hemoptysis  11/17: s/p bronchsocopy: reportedly old blood seen in airways!

## 2017-11-17 NOTE — PROGRESS NOTE ADULT - SUBJECTIVE AND OBJECTIVE BOX
Patient is a 47y old  Male who presents with a chief complaint of hemoptysis (09 Nov 2017 00:32)  intubated  tachycardic  just take off sedation        Any change in ROS:     MEDICATIONS  (STANDING):  atorvastatin 80 milliGRAM(s) Oral at bedtime  benzonatate 100 milliGRAM(s) Oral three times a day  buDESOnide 160 MICROgram(s)/formoterol 4.5 MICROgram(s) Inhaler 2 Puff(s) Inhalation two times a day  cisatracurium Infusion 2 MICROgram(s)/kG/Min (9 mL/Hr) IV Continuous <Continuous>  dexmedetomidine Infusion 0.3 MICROgram(s)/kG/Hr (5.625 mL/Hr) IV Continuous <Continuous>  dextrose 5%. 1000 milliLiter(s) (50 mL/Hr) IV Continuous <Continuous>  dextrose 50% Injectable 12.5 Gram(s) IV Push once  dextrose 50% Injectable 25 Gram(s) IV Push once  dextrose 50% Injectable 25 Gram(s) IV Push once  diVALproex  milliGRAM(s) Oral two times a day  docusate sodium 100 milliGRAM(s) Oral two times a day  fentaNYL   Infusion 1 MICROgram(s)/kG/Hr (7.5 mL/Hr) IV Continuous <Continuous>  ferrous    sulfate 325 milliGRAM(s) Oral three times a day with meals  folic acid 1 milliGRAM(s) Oral daily  insulin lispro (HumaLOG) corrective regimen sliding scale   SubCutaneous every 6 hours  ipratropium 17 MICROgram(s) HFA Inhaler 1 Puff(s) Inhalation every 6 hours  lidocaine   Patch 1 Patch Transdermal daily  methylPREDNISolone sodium succinate Injectable 20 milliGRAM(s) IV Push every 8 hours  midazolam Infusion 5 mG/Hr (5 mL/Hr) IV Continuous <Continuous>  pantoprazole  Injectable 40 milliGRAM(s) IV Push daily  phenylephrine    Infusion 0.1 MICROgram(s)/kG/Min (2.813 mL/Hr) IV Continuous <Continuous>  piperacillin/tazobactam IVPB. 3.375 Gram(s) IV Intermittent every 8 hours  polyethylene glycol 3350 17 Gram(s) Oral at bedtime  sodium chloride 0.9% Bolus 250 milliLiter(s) IV Bolus once  sodium chloride 0.9%. 1000 milliLiter(s) (30 mL/Hr) IV Continuous <Continuous>  vancomycin  IVPB 1000 milliGRAM(s) IV Intermittent every 12 hours  voriconazole IVPB 300 milliGRAM(s) IV Intermittent every 12 hours    MEDICATIONS  (PRN):  ALBUTerol    90 MICROgram(s) HFA Inhaler 2 Puff(s) Inhalation every 6 hours PRN Shortness of Breath and/or Wheezing  dextrose Gel 1 Dose(s) Oral once PRN Blood Glucose LESS THAN 70 milliGRAM(s)/deciliter  glucagon  Injectable 1 milliGRAM(s) IntraMuscular once PRN Glucose LESS THAN 70 milligrams/deciliter    Vital Signs Last 24 Hrs  T(C): 36.7 (17 Nov 2017 04:00), Max: 36.8 (17 Nov 2017 00:00)  T(F): 98 (17 Nov 2017 04:00), Max: 98.2 (17 Nov 2017 00:00)  HR: 100 (17 Nov 2017 15:16) (61 - 130)  BP: --  BP(mean): --  RR: 20 (17 Nov 2017 15:01) (0 - 20)  SpO2: 99% (17 Nov 2017 15:16) (97% - 100%)  Mode: AC/ CMV (Assist Control/ Continuous Mandatory Ventilation)  RR (machine): 20  TV (machine): 350  FiO2: 40  PEEP: 5  MAP: 16  PIP: 44    I&O's Summary    16 Nov 2017 07:01  -  17 Nov 2017 07:00  --------------------------------------------------------  IN: 3513.4 mL / OUT: 2385 mL / NET: 1128.4 mL    17 Nov 2017 07:01  -  17 Nov 2017 15:46  --------------------------------------------------------  IN: 77 mL / OUT: 0 mL / NET: 77 mL          Physical Exam:   GENERAL: NAD, well-groomed, well-developed  HEENT: GIOVANI/   Atraumatic, Normocephalic  ENMT: No tonsillar erythema, exudates, or enlargement; Moist mucous membranes, Good dentition, No lesions  NECK: Supple, No JVD, Normal thyroid  CHEST/LUNG: Clear to auscultaion, ; No rales, rhonchi, wheezing, or rubs  CVS: Regular rate and rhythm; No murmurs, rubs, or gallops  GI: : Soft, Nontender, Nondistended; Bowel sounds present  NERVOUS SYSTEM:  lethargic, intubated   EXTREMITIES:  2+ Peripheral Pulses, No clubbing, cyanosis, or edema  LYMPH: No lymphadenopathy noted  SKIN: No rashes or lesions  ENDOCRINOLOGY: No Thyromegaly  PSYCH: calm    Labs:  ABG - ( 17 Nov 2017 03:50 )  pH: 7.43  /  pCO2: 55    /  pO2: 175   / HCO3: 35    / Base Excess: 11.3  /  SaO2: 99.8                                8.4    12.04 )-----------( 77       ( 17 Nov 2017 03:50 )             25.8                         9.1    8.92  )-----------( 73       ( 16 Nov 2017 16:00 )             28.6                         9.5    15.20 )-----------( 89       ( 16 Nov 2017 02:25 )             29.5                         9.5    11.18 )-----------( 84       ( 15 Nov 2017 13:24 )             29.8                         10.0   11.41 )-----------( 94       ( 15 Nov 2017 09:00 )             32.2                         10.7   12.15 )-----------( 110      ( 14 Nov 2017 06:00 )             34.9     11-17    133<L>  |  93<L>  |  13  ----------------------------<  159<H>  4.9   |  32<H>  |  0.66  11-16    133<L>  |  91<L>  |  16  ----------------------------<  219<H>  5.0   |  30  |  0.71  11-16    135  |  92<L>  |  13  ----------------------------<  186<H>  4.9   |  33<H>  |  0.76  11-15    135  |  91<L>  |  16  ----------------------------<  129<H>  5.0   |  35<H>  |  0.67    Ca    8.7      17 Nov 2017 03:50  Ca    8.6      16 Nov 2017 16:00  Ca    9.1      16 Nov 2017 02:25  Phos  3.5     11-17  Phos  6.0     11-16  Mg     1.9     11-17  Mg     1.5     11-16    TPro  5.8<L>  /  Alb  2.8<L>  /  TBili  0.2  /  DBili  x   /  AST  9   /  ALT  9   /  AlkPhos  57  11-17  TPro  6.2  /  Alb  3.1<L>  /  TBili  0.2  /  DBili  x   /  AST  8   /  ALT  7   /  AlkPhos  62  11-16  TPro  6.4  /  Alb  3.2<L>  /  TBili  0.4  /  DBili  x   /  AST  9   /  ALT  11  /  AlkPhos  65  11-16    CAPILLARY BLOOD GLUCOSE      POCT Blood Glucose.: 148 mg/dL (17 Nov 2017 05:37)  POCT Blood Glucose.: 220 mg/dL (16 Nov 2017 23:41)      LIVER FUNCTIONS - ( 17 Nov 2017 03:50 )  Alb: 2.8 g/dL / Pro: 5.8 g/dL / ALK PHOS: 57 u/L / ALT: 9 u/L / AST: 9 u/L / GGT: x           PT/INR - ( 17 Nov 2017 03:50 )   PT: 13.7 SEC;   INR: 1.22          PTT - ( 17 Nov 2017 03:50 )  PTT:24.1 SEC    Cultures:           Wound culture:                11-15 @ 16:22  Organism --  Culture w/ gram stain --  Specimen Source BRONCHIAL LAVAGE    Wound culture:                11-15 @ 09:22  Organism --  Culture w/ gram stain --  Specimen Source BLOOD    Wound culture:                11-14 @ 00:44  Organism --  Culture w/ gram stain --  Specimen Source SPUTUM    Wound culture:                11-13 @ 13:26  Organism --  Culture w/ gram stain --  Specimen Source URINE MIDSTREAM    Wound culture:                11-13 @ 13:20  Organism --  Culture w/ gram stain --  Specimen Source BLOOD PERIPHERAL      Abscess culture:             11-15 @ 16:22  Organism --  Gram Stain --  Specimen Source BRONCHIAL LAVAGE    Abscess culture:             11-15 @ 09:22  Organism --  Gram Stain --  Specimen Source BLOOD    Abscess culture:             11-14 @ 00:44  Organism --  Gram Stain --  Specimen Source SPUTUM    Abscess culture:             11-13 @ 13:26  Organism --  Gram Stain --  Specimen Source URINE MIDSTREAM    Abscess culture:             11-13 @ 13:20  Organism --  Gram Stain --  Specimen Source BLOOD PERIPHERAL        Tissue culture:           11-15 @ 16:22  Organism --  Gram Stain --  Specimen Source BRONCHIAL LAVAGE    Tissue culture:           11-15 @ 09:22  Organism --  Gram Stain --  Specimen Source BLOOD    Tissue culture:           11-14 @ 00:44  Organism --  Gram Stain --  Specimen Source SPUTUM    Tissue culture:           11-13 @ 13:26  Organism --  Gram Stain --  Specimen Source URINE MIDSTREAM    Tissue culture:           11-13 @ 13:20  Organism --  Gram Stain --  Specimen Source BLOOD PERIPHERAL      Body Fluid Smear & Culture:                        11-15 @ 16:22  AFB Smear  --  Culture Acid Fast Body Fluid w/ Smear  --  Culture Acid Fast Smear Concentrated     AFB SMEAR= NO ACID FAST BACILLI SEEN    Culture Results:     --  Specimen Source BRONCHIAL LAVAGE    Body Fluid Smear & Culture:                        11-15 @ 09:22  AFB Smear  --  Culture Acid Fast Body Fluid w/ Smear  --  Culture Acid Fast Smear Concentrated   --    Culture Results:     --  Specimen Source BLOOD    Body Fluid Smear & Culture:                        11-14 @ 00:44  AFB Smear  --  Culture Acid Fast Body Fluid w/ Smear  --  Culture Acid Fast Smear Concentrated   --    Culture Results:     --  Specimen Source SPUTUM    Body Fluid Smear & Culture:                        11-13 @ 13:26  AFB Smear  --  Culture Acid Fast Body Fluid w/ Smear  --  Culture Acid Fast Smear Concentrated   --    Culture Results:     --  Specimen Source URINE MIDSTREAM    Body Fluid Smear & Culture:                        11-13 @ 13:20  AFB Smear  --  Culture Acid Fast Body Fluid w/ Smear  --  Culture Acid Fast Smear Concentrated   --    Culture Results:     --  Specimen Source BLOOD PERIPHERAL        Rapid Respiratory Viral Panel Result        11-13 @ 15:46  Rapid RVP --  Coronovirus --  Adenovirus NOT DETECTED  Bordetella Pertussis NOT DETECTED  Chlamydia Pneumonia NOT DETECTED  Entero/RhinovirusNOT DETECTED  HKU1 Coronovirus --  HMPV Coronovirus NOT DETECTED  Influenza A NOT DETECTED (any subtype)  Influenza AH1 NOT DETECTED  Influenza AH1 2009 NOT DETECTED  Influenza AH3 NOT DETECTED  Influenza B NOT DETECTED  Mycoplasma Pneumoniae NOT DETECTED This nucleic acid amplification assay was performed using  the [employer] Film[employer]. The following pathogens are tested  for: Adenovirus, Coronavirus 229E, Coronavirus HKU1,  Coronavirus NL63, Coronavirus OC43, Human Metapneumovirus  (HMPV), Rhinovirus/Enterovirus, Influenza A H1, Influenza A  H1 2009 (Pandemic H1 2009), Influenza A H3, Influenza A (Flu  A) subtype not identified, Influenza B, Parainfluenza Virus  (types 1, 2, 3, 4), Respiratory Syncytial Virus (RSV),  Bordetella pertussis, Chlamydophila pneumoniae, and  Mycoplasma pneumoniae. A negative FilmArray result does not  always exclude the possibility of viral or bacterial  infection. Laboratory results should always be interpreted  in the context of clinical findings.  NL63 Coronovirus --  OC43 Coronovirus --  Parainfluenza 1 NOT DETECTED  Parainfluenza 2 NOT DETECTED  Parainfluenza 3 NOT DETECTED  Parainfluenza 4 NOT DETECTED  Resp Syncytial Virus NOT DETECTED      < from: Xray Chest 1 View AP -PORTABLE-Routine (11.17.17 @ 07:33) >  EXAM:  RAD CHEST PORTABLE ROUTINE        PROCEDURE DATE:  Nov 17 2017         INTERPRETATION:  TIME OF EXAM: November 17, 2017 at 7:19 AM    CLINICAL INFORMATION: Respiratory failure    TECHNIQUE:   Portable chest    INTERPRETATION:     Endotracheal tube unchanged in satisfactory position. No change in the   appearance of the lungs that are hyperinflated with large bullae on the   left side and/or loculated pneumothorax unchanged.  The heart is not   enlarged. Trace left effusion.      COMPARISON:  November 16      IMPRESSION:  Follow-up showing no interval change.                  RANDALL AMADOR M.D.; ATTENDING RADIOLOGIST  This document has been electronically signed. Nov 17 2017  2:35PM    < end of copied text >      Studies  Chest X-RAY  CT SCAN Chest   Venous Dopplers: LE:   CT Abdomen  Others          < from: Xray Chest 1 View AP- PORTABLE-Urgent (11.16.17 @ 18:57) >  EXAM:  RAD CHEST PORTABLE URGENT        PROCEDURE DATE:  Nov 16 2017         INTERPRETATION:  TIME OF EXAM: November 16, 2017 at 6:29 PM    CLINICAL INFORMATION: Endotracheal tube check.    TECHNIQUE:   Portable chest    INTERPRETATION:     Endotracheal tube remains in satisfactory position. No change in the   appearance of the 2 lungs with bilateral linear opacities, central left   pulmonary opacities likely atelectatic lung and pneumothorax or large   bulla within the left upper lobe.      COMPARISON:  November 16 at 6:12 AM      IMPRESSION:  Follow-up showing satisfactory position of endotracheal tube.                  RANDALL AMADOR M.D.; ATTENDING RADIOLOGIST    < end of copied text >

## 2017-11-17 NOTE — PROGRESS NOTE ADULT - ASSESSMENT
47 year old male with a PMHx of sarcoidosis, HTN, asthma, and COPD with hemoptysis and bleed identified in [facility], now s/p bronchial embolization by IR  - Pt vented, sedated (around noon)  - VSS  - Site check performed

## 2017-11-17 NOTE — PROGRESS NOTE ADULT - ASSESSMENT
47 year old with sarcoidosis and COPD presents after recent andree surgery with recurrent hemoptysis.     This is liklely multifactorial- Cystic lung changes, sarcoid, ? superimposed infection many all be contributing.     His CT has peribronchial changes.    Continue bacterial coverage with vanco/ zosyn through 11/22  Continue voriconazole pending fungal culture.  Fungal cultures can be lower yield- may be best to treat empirically for 6 more weeks.      Call the ID service with questions or concerns over the weekend.  [phone]

## 2017-11-17 NOTE — PROGRESS NOTE ADULT - SUBJECTIVE AND OBJECTIVE BOX
POST ANESTHESIA EVALUATION    47y Male POSTOP DAY 1 S/P [facility] arteriogram with embolization    MENTAL STATUS: Patient participation [  ] Awake     [  ] Arousable     [ x ] Sedated    AIRWAY PATENCY: [x  ] Satisfactory  [  ] Other:     Vital Signs Last 24 Hrs  T(C): 36.7 (17 Nov 2017 04:00), Max: 36.8 (17 Nov 2017 00:00)  T(F): 98 (17 Nov 2017 04:00), Max: 98.2 (17 Nov 2017 00:00)  HR: 70 (17 Nov 2017 09:45) (68 - 130)  BP: --  BP(mean): --  RR: 20 (17 Nov 2017 07:00) (20 - 20)  SpO2: 100% (17 Nov 2017 09:45) (100% - 100%)  I&O's Summary    16 Nov 2017 07:01  -  17 Nov 2017 07:00  --------------------------------------------------------  IN: 3513.4 mL / OUT: 2385 mL / NET: 1128.4 mL    17 Nov 2017 07:01  -  17 Nov 2017 09:52  --------------------------------------------------------  IN: 77 mL / OUT: 0 mL / NET: 77 mL          NAUSEA/ VOMITTING:  [x  ] NONE  [  ] CONTROLLED [  ] OTHER     PAIN: [ x ] CONTROLLED WITH CURRENT REGIMEN  [  ] OTHER    [ x ] NO APPARENT ANESTHESIA COMPLICATIONS      Comments:

## 2017-11-17 NOTE — PROGRESS NOTE ADULT - PROBLEM SELECTOR PLAN 1
- s/p embolization by IR  - Pt currently stable with no episodes of hemoptysis following embo  - Continue to monitor pt  - Please call IR with any questions, [phone]

## 2017-11-17 NOTE — PROGRESS NOTE ADULT - SUBJECTIVE AND OBJECTIVE BOX
HPI:  47 year old male with a PMHx of sarcoidosis, HTN, asthma, and COPD with hemoptysis and bleed identified in [facility], now s/p bronchial embolization by IR. Pt currently intubated and sedated, stable (pt seen around noon). As per family in room and pt's nurse, pt has been doing well s/p embo with no episodes of hemoptysis.       MEDICATIONS  (STANDING):  atorvastatin 80 milliGRAM(s) Oral at bedtime  benzonatate 100 milliGRAM(s) Oral three times a day  buDESOnide 160 MICROgram(s)/formoterol 4.5 MICROgram(s) Inhaler 2 Puff(s) Inhalation two times a day  cisatracurium Infusion 2 MICROgram(s)/kG/Min (9 mL/Hr) IV Continuous <Continuous>  dexmedetomidine Infusion 0.3 MICROgram(s)/kG/Hr (5.625 mL/Hr) IV Continuous <Continuous>  dextrose 5%. 1000 milliLiter(s) (50 mL/Hr) IV Continuous <Continuous>  dextrose 50% Injectable 12.5 Gram(s) IV Push once  dextrose 50% Injectable 25 Gram(s) IV Push once  dextrose 50% Injectable 25 Gram(s) IV Push once  diVALproex  milliGRAM(s) Oral two times a day  docusate sodium 100 milliGRAM(s) Oral two times a day  fentaNYL   Infusion 1 MICROgram(s)/kG/Hr (7.5 mL/Hr) IV Continuous <Continuous>  ferrous    sulfate 325 milliGRAM(s) Oral three times a day with meals  folic acid 1 milliGRAM(s) Oral daily  insulin lispro (HumaLOG) corrective regimen sliding scale   SubCutaneous every 6 hours  ipratropium 17 MICROgram(s) HFA Inhaler 1 Puff(s) Inhalation every 6 hours  lidocaine   Patch 1 Patch Transdermal daily  methylPREDNISolone sodium succinate Injectable 20 milliGRAM(s) IV Push every 8 hours  midazolam Infusion 5 mG/Hr (5 mL/Hr) IV Continuous <Continuous>  pantoprazole  Injectable 40 milliGRAM(s) IV Push daily  phenylephrine    Infusion 0.1 MICROgram(s)/kG/Min (2.813 mL/Hr) IV Continuous <Continuous>  piperacillin/tazobactam IVPB. 3.375 Gram(s) IV Intermittent every 8 hours  polyethylene glycol 3350 17 Gram(s) Oral at bedtime  sodium chloride 0.9% Bolus 250 milliLiter(s) IV Bolus once  sodium chloride 0.9%. 1000 milliLiter(s) (30 mL/Hr) IV Continuous <Continuous>  vancomycin  IVPB 1000 milliGRAM(s) IV Intermittent every 12 hours  voriconazole IVPB 300 milliGRAM(s) IV Intermittent every 12 hours    MEDICATIONS  (PRN):  ALBUTerol    90 MICROgram(s) HFA Inhaler 2 Puff(s) Inhalation every 6 hours PRN Shortness of Breath and/or Wheezing  dextrose Gel 1 Dose(s) Oral once PRN Blood Glucose LESS THAN 70 milliGRAM(s)/deciliter  glucagon  Injectable 1 milliGRAM(s) IntraMuscular once PRN Glucose LESS THAN 70 milligrams/deciliter    Physical exam:    Vital Signs Last 24 Hrs  T(C): 36.7 (17 Nov 2017 04:00), Max: 36.8 (17 Nov 2017 00:00)  T(F): 98 (17 Nov 2017 04:00), Max: 98.2 (17 Nov 2017 00:00)  HR: 100 (17 Nov 2017 15:16) (61 - 130)  BP: --  BP(mean): --  RR: 20 (17 Nov 2017 15:01) (0 - 20)  SpO2: 99% (17 Nov 2017 15:16) (97% - 100%)    Constitutional: Pt was intubated and sedated, stable (around noon)    Respiratory: Pt vented    Cardiovascular: RRR, no murmurs or rubs    Gastrointestinal: Nontender, nondistended    Vascular: Femoral and pedal pulses intact b/l at 2+    Site: Right groin site was without erythema, bleeding, or discharge. Skin edges well approximated. Nontender to palpation, no hematoma. Dressing was changed.      LABS:                        8.4    12.04 )-----------( 77       ( 17 Nov 2017 03:50 )             25.8     11-17    133<L>  |  93<L>  |  13  ----------------------------<  159<H>  4.9   |  32<H>  |  0.66    Ca    8.7      17 Nov 2017 03:50  Phos  3.5     11-17  Mg     1.9     11-17    TPro  5.8<L>  /  Alb  2.8<L>  /  TBili  0.2  /  DBili  x   /  AST  9   /  ALT  9   /  AlkPhos  57  11-17    I&O's Detail    16 Nov 2017 07:01  -  17 Nov 2017 07:00  --------------------------------------------------------  IN:    cisatracurium Infusion: 562.5 mL    dexmedetomidine Infusion: 225 mL    fentaNYL  Infusion: 680.3 mL    IV PiggyBack: 700 mL    midazolam Infusion: 60 mL    phenylephrine   Infusion: 5.6 mL    phenylephrine   Infusion: 60 mL    Sodium Chloride 0.9% IV Bolus: 250 mL    sodium chloride 0.9%.: 970 mL  Total IN: 3513.4 mL    OUT:    Indwelling Catheter - Urethral: 2385 mL  Total OUT: 2385 mL    Total NET: 1128.4 mL      17 Nov 2017 07:01  -  17 Nov 2017 18:42  --------------------------------------------------------  IN:    fentaNYL  Infusion: 37 mL    midazolam Infusion: 5 mL    phenylephrine   Infusion: 5 mL    sodium chloride 0.9%.: 30 mL  Total IN: 77 mL    OUT:  Total OUT: 0 mL    Total NET: 77 mL

## 2017-11-17 NOTE — PROGRESS NOTE ADULT - SUBJECTIVE AND OBJECTIVE BOX
47 M PMH of sarcoidosis, HTN, COPD, and asthma who was initially admitted on 09/10 w cc of severe leg spasms X two days  On 9/12 at 4 am, a RRT and Stroke codes were called as the patient became unresponsive.  He was transferred to MICU and then intubated for airway protection.   The patient was found to have a new infarct on MRI and also found to have a patent PFO on this admission as well. He was weaned off vent on 09/13 and transferred to the floors   There was concern patient was having paroxysmal afib, so he was started on Eliquis and received 2 doses of Eliquis on 09/19.  However, on 09/20, the patient had a cough productive of  fresh blood, about 1/2 cup of blood. He underwent Bronchoscopy with bronchoalveolar lavage of both sides and epi injection on 09/21  However, he developed hemoptysis again and underwent urgent LULectomy, complicated with post op bleeding.  [id number]:  Intubated fro airway protection  [id number]:  Extubated  [id number]:  FOB 2/2 large hemoptysis  [id number]:   Left VATS and DAVID lobectomy 9/24/17   [id number]:   Re-op L thoracotomy, evacuation of hemothorax (600cc clot, 400cc blood) no obvious source of bleeding      Re-op L thoracotomy, evacuation of hemothorax; RTOR x2 100cc clot; 200cc blood: bleeding posterior intercostal arteries identified and cauterized 9/25/17   [id number]:   L Pigtail catheter (IR) for L apical PTX.  (Transferred back to ICU 2/2 increased SOB)  [id number]   Bedside talc pleurodesis    Pt eventually discharged to rehab - readmitted on [id number]    [id number]:   Bronchoscopy with bronchoalveolar lavage  in OR- Remained intubated  [id number]:   Repeat bronchoscopy in ICU for persistent hemoptysis via ETT; bronchial block placement into left main bronchus  [id number]:   IR left bronchial artery embolization, Bronchoscopy with BAL post IR embolization - w/o evidence of active bleeding  [id number]:   Repeat bronchoscopy in ICU  via ETT; No evidence of active bleeding. Old clots    Issues:                 acute resp failure on vent - with hypoxemia              hemoptysis              Aspergilloma on Voriconazole              Sarcoidosis               PFO               Anemia  CVA  with L-side weakness              S/p post op bleeding              S/P Pulm edema, possible TRALI              s/p massive transfusion       Interval/Overnight Events/ ROS  Hemodynamically stable, not on any pressors or inotropes. Intubated, off of paralysis; sedated. Telemetry - with intermittent sinus tachycardia / with elevated BP treated       Home Medications:   * Incomplete Medication History as of 11-Sep-2017 09:23 documented in Structured Notes  · 	Azithromycin 5 Day Dose Pack 250 mg oral tablet: 1 dose(s) orally once a day for respiratory infection, Last Dose Taken:    · 	predniSONE 50 mg oral tablet: 1 tab(s) orally once a day , Last Dose Taken:    · 	albuterol 1.25 mg/3 mL (0.042%) inhalation solution: 3 milliliter(s) inhaled 3 times a day, Last Dose Taken:    · 	losartan 25 mg oral tablet: 1 tab(s) orally 2 times a day, Last Dose Taken:    · 	Symbicort 160 mcg-4.5 mcg/inh inhalation aerosol: 2 puff(s) inhaled 2 times a day, Last Dose Taken:        MEDICATIONS  (STANDING):  atorvastatin 80 milliGRAM(s) Oral at bedtime  benzonatate 100 milliGRAM(s) Oral three times a day  buDESOnide 160 MICROgram(s)/formoterol 4.5 MICROgram(s) Inhaler 2 Puff(s) Inhalation two times a day  cisatracurium Infusion 2 MICROgram(s)/kG/Min (9 mL/Hr) IV Continuous <Continuous>  dexmedetomidine Infusion 0.3 MICROgram(s)/kG/Hr (5.625 mL/Hr) IV Continuous <Continuous>  dextrose 5%. 1000 milliLiter(s) (50 mL/Hr) IV Continuous <Continuous>  dextrose 50% Injectable 12.5 Gram(s) IV Push once  dextrose 50% Injectable 25 Gram(s) IV Push once  dextrose 50% Injectable 25 Gram(s) IV Push once  diVALproex  milliGRAM(s) Oral two times a day  docusate sodium 100 milliGRAM(s) Oral two times a day  fentaNYL   Infusion 1 MICROgram(s)/kG/Hr (7.5 mL/Hr) IV Continuous <Continuous>  ferrous    sulfate 325 milliGRAM(s) Oral three times a day with meals  folic acid 1 milliGRAM(s) Oral daily  insulin lispro (HumaLOG) corrective regimen sliding scale   SubCutaneous every 6 hours  ipratropium 17 MICROgram(s) HFA Inhaler 1 Puff(s) Inhalation every 6 hours  lidocaine   Patch 1 Patch Transdermal daily  methylPREDNISolone sodium succinate Injectable 20 milliGRAM(s) IV Push every 8 hours  midazolam Infusion 5 mG/Hr (5 mL/Hr) IV Continuous <Continuous>  pantoprazole  Injectable 40 milliGRAM(s) IV Push daily  phenylephrine    Infusion 0.1 MICROgram(s)/kG/Min (2.813 mL/Hr) IV Continuous <Continuous>  piperacillin/tazobactam IVPB. 3.375 Gram(s) IV Intermittent every 8 hours  polyethylene glycol 3350 17 Gram(s) Oral at bedtime  sodium chloride 0.9% Bolus 250 milliLiter(s) IV Bolus once  sodium chloride 0.9%. 1000 milliLiter(s) (30 mL/Hr) IV Continuous <Continuous>  vancomycin  IVPB 1000 milliGRAM(s) IV Intermittent every 12 hours  voriconazole IVPB 300 milliGRAM(s) IV Intermittent every 12 hours    MEDICATIONS  (PRN):  ALBUTerol    90 MICROgram(s) HFA Inhaler 2 Puff(s) Inhalation every 6 hours PRN Shortness of Breath and/or Wheezing  dextrose Gel 1 Dose(s) Oral once PRN Blood Glucose LESS THAN 70 milliGRAM(s)/deciliter  glucagon  Injectable 1 milliGRAM(s) IntraMuscular once PRN Glucose LESS THAN 70 milligrams/deciliter      ICU Vital Signs Last 24 Hrs  T(C): 36.7 (17 Nov 2017 04:00), Max: 36.8 (17 Nov 2017 00:00)  T(F): 98 (17 Nov 2017 04:00), Max: 98.2 (17 Nov 2017 00:00)  HR: 61 (17 Nov 2017 11:07) (61 - 130)  ABP: 118/72 (17 Nov 2017 10:00) (92/56 - 151/95)  ABP(mean): 90 (17 Nov 2017 10:00) (70 - 116)  RR: 20 (17 Nov 2017 10:00) (20 - 20)  SpO2: 100% (17 Nov 2017 11:07) (100% - 100%)      Physical exam:                           General:             intubated  Neuro:               sedated, paralysed        	  HEENT:              GIOVANI/ ETT- with residual bloody secretions on suctioning  Respiratory:	Lungs sound coarse on auscultation bilaterally .                          Scattered rales, rhonchi, no wheezing.   CV:		Regular rate and rhythm. Normal S1/S2, tachycardia on/off  Abdomen:         Soft,  nontender, not-distended. Bowel sounds present   Skin:		No rash.  Extremities:	Warm, no cyanosis or edema.  Palpable pulses      I&O's Summary    16 Nov 2017 07:01  -  17 Nov 2017 07:00  --------------------------------------------------------  IN: 3513.4 mL / OUT: 2385 mL / NET: 1128.4 mL    17 Nov 2017 07:01  -  17 Nov 2017 11:57  --------------------------------------------------------  IN: 77 mL / OUT: 0 mL / NET: 77 mL    Labs:                                                                    8.4    12.04 )-----------( 77       ( 17 Nov 2017 03:50 )             25.8                            11-17  133<L>  |  93<L>  |  13  ----------------------------<  159<H>  4.9   |  32<H>  |  0.66    Ca    8.7      17 Nov 2017 03:50  Phos  3.5     11-17  Mg     1.9     11-17    TPro  5.8<L>  /  Alb  2.8<L>  /  TBili  0.2  /  DBili  x   /  AST  9   /  ALT  9   /  AlkPhos  57  11-17    POCT Blood Glucose.: 148 mg/dL (17 Nov 2017 05:37)    LIVER FUNCTIONS - ( 17 Nov 2017 03:50 )  Alb: 2.8 g/dL / Pro: 5.8 g/dL / ALK PHOS: 57 u/L / ALT: 9 u/L / AST: 9 u/L / GGT: x                                PT/INR - ( 17 Nov 2017 03:50 )   PT: 13.7 SEC;   INR: 1.22     PTT - ( 17 Nov 2017 03:50 )  PTT:24.1 SEC    Mode: AC/ CMV (Assist Control/ Continuous Mandatory Ventilation)  RR (machine): 20  TV (machine): 350  FiO2: 40  PEEP: 5  MAP: 16  PIP: 37        ABG - ( 17 Nov 2017 03:50 )  pH: 7.43  /  pCO2: 55    /  pO2: 175   / HCO3: 35    / Base Excess: 11.3  /  SaO2: 99.8        Plan:  47 M PMH of sarcoidosis, HTN, COPD, and asthma who was initially admitted on 09/10 w cc of severe leg spasms X two days  On 9/12 at 4 am, a RRT and Stroke codes were called as the patient became unresponsive.  He was transferred to MICU and then intubated for airway protection.   The patient was found to have a new infarct on MRI and also found to have a patent PFO on this admission as well. He was weaned off vent on 09/13 and transferred to the floors   There was concern patient was having paroxysmal afib, so he was started on Eliquis and received 2 doses of Eliquis on 09/19.  However, on 09/20, the patient had a cough productive of  fresh blood, about 1/2 cup of blood. He underwent Bronchoscopy with bronchoalveolar lavage of both sides and epi injection on 09/21  However, he developed hemoptysis again and underwent urgent LULectomy, complicated with post op bleeding.  [id number]:  Intubated fro airway protection  [id number]:  Extubated  [id number]:  FOB 2/2 large hemoptysis  [id number]:   Left VATS and DAVID lobectomy 9/24/17   [id number]:   Re-op L thoracotomy, evacuation of hemothorax (600cc clot, 400cc blood) no obvious source of bleeding      Re-op L thoracotomy, evacuation of hemothorax; RTOR x2 100cc clot; 200cc blood: bleeding posterior intercostal arteries identified and cauterized 9/25/17   [id number]:   L Pigtail catheter (IR) for L apical PTX.  (Transferred back to ICU 2/2 increased SOB)  [id number]   Bedside talc pleurodesis    Pt eventually discharged to rehab - readmitted on 110907    [id number]:   Bronchoscopy with bronchoalveolar lavage  in OR- Remained intubated  [id number]:   Repeat bronchoscopy in ICU for persistent hemoptysis via ETT; bronchial block placement into left main bronchus  [id number]:   IR left bronchial artery embolization, Bronchoscopy with BAL post IR embolization - w/o evidence of active bleeding  [id number]:   Repeat bronchoscopy in ICU  via ETT; No evidence of active bleeding. 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                                        Phenylephrine for BP control while on sedation - as indicated for SBP > 100, MAP > 65                            Respiratory:  Mechanical Ventilation:  Mode: AC  RR (machine): 20, TV (machine): 350, FiO2: 50, PEEP: 5, MAP: 16m, PIP: 42  Mechanical ventilator status assessed & settings reviewed  Continue bronchodilators, pulmonary toilet  Head of bed elevation to 30-40 degrees  ETT suctioning as needed  Assesment for vent weaning in AM if resp status stable and bleeding resolved   Pulm f/up                             GI                                         On clears / regular diet, tolerating                                         Continue GI prophylaxis with  Protonix                                         Continue Zofran / Reglan for nausea - PRN	                                         NPO                                  Renal:                                         Continue IVF                                         Monitor I/Os and electrolytes                                         Keep [place] for UO monitoring                                 Hematologic / Oncology:                                         No signs of active bleeding at thia point. ETT bloody secretions -  old blood on suctioning     Follow CBC, coags  in AM     DVT prophylaxis with SCD,                            Infectious disease:  No signs of new  infection. Monitor for fever / leukocytosis.  Abx  :  Zosyn, Vanco, Voriconazole   F/up blood and resp cultures from 11/15/17  ID f/up                            Endocrine:  Glycemic monitoring  F/S with coverage q6  Solumedrol 20 mg q 8 for sarcoidosis/COPD  Continue Finger stick glucose checks with coverage    All available pertinent clinical, laboratory, radiographic, hemodynamic, echocardiographic, respiratory data, microbiologic data and chart were reviewed and analyzed frequently throughout the course of the day and night. GI and DVT prophylaxis, glycemic control, head of bed elevation and skin care issues were addressed.  Patient seen, examined and plan discussed with CT Surgery / CTICU team during rounds.    I have spent 80 minutes of critical care time with this pt between 7 am and  9 pm    Prosper Wen MD

## 2017-11-18 LAB
APTT BLD: 25.9 SEC — LOW (ref 27.5–37.4)
BACTERIA BLD CULT: SIGNIFICANT CHANGE UP
BACTERIA SPT RESP CULT: SIGNIFICANT CHANGE UP
BASE EXCESS BLDA CALC-SCNC: 6.4 MMOL/L — SIGNIFICANT CHANGE UP
BASE EXCESS BLDA CALC-SCNC: 7.9 MMOL/L — SIGNIFICANT CHANGE UP
BASE EXCESS BLDA CALC-SCNC: 8 MMOL/L — SIGNIFICANT CHANGE UP
BASE EXCESS BLDA CALC-SCNC: 8.4 MMOL/L — SIGNIFICANT CHANGE UP
BLD GP AB SCN SERPL QL: NEGATIVE — SIGNIFICANT CHANGE UP
BUN SERPL-MCNC: 18 MG/DL — SIGNIFICANT CHANGE UP (ref 7–23)
BUN SERPL-MCNC: 19 MG/DL — SIGNIFICANT CHANGE UP (ref 7–23)
CALCIUM SERPL-MCNC: 8.9 MG/DL — SIGNIFICANT CHANGE UP (ref 8.4–10.5)
CALCIUM SERPL-MCNC: 9.1 MG/DL — SIGNIFICANT CHANGE UP (ref 8.4–10.5)
CHLORIDE SERPL-SCNC: 94 MMOL/L — LOW (ref 98–107)
CHLORIDE SERPL-SCNC: 95 MMOL/L — LOW (ref 98–107)
CO2 SERPL-SCNC: 32 MMOL/L — HIGH (ref 22–31)
CO2 SERPL-SCNC: 32 MMOL/L — HIGH (ref 22–31)
CREAT SERPL-MCNC: 0.73 MG/DL — SIGNIFICANT CHANGE UP (ref 0.5–1.3)
CREAT SERPL-MCNC: 0.81 MG/DL — SIGNIFICANT CHANGE UP (ref 0.5–1.3)
GLUCOSE BLDA-MCNC: 149 MG/DL — HIGH (ref 70–99)
GLUCOSE SERPL-MCNC: 157 MG/DL — HIGH (ref 70–99)
GLUCOSE SERPL-MCNC: 190 MG/DL — HIGH (ref 70–99)
HCO3 BLDA-SCNC: 29 MMOL/L — HIGH (ref 22–26)
HCO3 BLDA-SCNC: 31 MMOL/L — HIGH (ref 22–26)
HCO3 BLDA-SCNC: 31 MMOL/L — HIGH (ref 22–26)
HCO3 BLDA-SCNC: 32 MMOL/L — HIGH (ref 22–26)
HCT VFR BLD CALC: 29.7 % — LOW (ref 39–50)
HCT VFR BLDA CALC: 29 % — LOW (ref 39–51)
HGB BLD-MCNC: 9.2 G/DL — LOW (ref 13–17)
HGB BLDA-MCNC: 9.4 G/DL — LOW (ref 13–17)
INR BLD: 1.16 — SIGNIFICANT CHANGE UP (ref 0.88–1.17)
MAGNESIUM SERPL-MCNC: 1.7 MG/DL — SIGNIFICANT CHANGE UP (ref 1.6–2.6)
MCHC RBC-ENTMCNC: 30.3 PG — SIGNIFICANT CHANGE UP (ref 27–34)
MCHC RBC-ENTMCNC: 31 % — LOW (ref 32–36)
MCV RBC AUTO: 97.7 FL — SIGNIFICANT CHANGE UP (ref 80–100)
NRBC # FLD: 0 — SIGNIFICANT CHANGE UP
PCO2 BLDA: 52 MMHG — HIGH (ref 35–48)
PCO2 BLDA: 72 MMHG — CRITICAL HIGH (ref 35–48)
PCO2 BLDA: 81 MMHG — CRITICAL HIGH (ref 35–48)
PCO2 BLDA: 89 MMHG — CRITICAL HIGH (ref 35–48)
PH BLDA: 7.21 PH — LOW (ref 7.35–7.45)
PH BLDA: 7.26 PH — LOW (ref 7.35–7.45)
PH BLDA: 7.3 PH — LOW (ref 7.35–7.45)
PH BLDA: 7.42 PH — SIGNIFICANT CHANGE UP (ref 7.35–7.45)
PHOSPHATE SERPL-MCNC: 4 MG/DL — SIGNIFICANT CHANGE UP (ref 2.5–4.5)
PLATELET # BLD AUTO: 77 K/UL — LOW (ref 150–400)
PMV BLD: 13.8 FL — HIGH (ref 7–13)
PO2 BLDA: 120 MMHG — HIGH (ref 83–108)
PO2 BLDA: 142 MMHG — HIGH (ref 83–108)
PO2 BLDA: 166 MMHG — HIGH (ref 83–108)
PO2 BLDA: 174 MMHG — HIGH (ref 83–108)
POTASSIUM BLDA-SCNC: 4.1 MMOL/L — SIGNIFICANT CHANGE UP (ref 3.4–4.5)
POTASSIUM SERPL-MCNC: 4.5 MMOL/L — SIGNIFICANT CHANGE UP (ref 3.5–5.3)
POTASSIUM SERPL-MCNC: 4.7 MMOL/L — SIGNIFICANT CHANGE UP (ref 3.5–5.3)
POTASSIUM SERPL-SCNC: 4.5 MMOL/L — SIGNIFICANT CHANGE UP (ref 3.5–5.3)
POTASSIUM SERPL-SCNC: 4.7 MMOL/L — SIGNIFICANT CHANGE UP (ref 3.5–5.3)
PROTHROM AB SERPL-ACNC: 13 SEC — SIGNIFICANT CHANGE UP (ref 9.8–13.1)
RBC # BLD: 3.04 M/UL — LOW (ref 4.2–5.8)
RBC # FLD: 14.5 % — SIGNIFICANT CHANGE UP (ref 10.3–14.5)
RH IG SCN BLD-IMP: POSITIVE — SIGNIFICANT CHANGE UP
SAO2 % BLDA: 98.1 % — SIGNIFICANT CHANGE UP (ref 95–99)
SAO2 % BLDA: 98.4 % — SIGNIFICANT CHANGE UP (ref 95–99)
SAO2 % BLDA: 99.3 % — HIGH (ref 95–99)
SAO2 % BLDA: 99.7 % — HIGH (ref 95–99)
SODIUM BLDA-SCNC: 131 MMOL/L — LOW (ref 136–146)
SODIUM SERPL-SCNC: 135 MMOL/L — SIGNIFICANT CHANGE UP (ref 135–145)
SODIUM SERPL-SCNC: 136 MMOL/L — SIGNIFICANT CHANGE UP (ref 135–145)
WBC # BLD: 14.32 K/UL — HIGH (ref 3.8–10.5)
WBC # FLD AUTO: 14.32 K/UL — HIGH (ref 3.8–10.5)

## 2017-11-18 PROCEDURE — 71010: CPT | Mod: 26,76

## 2017-11-18 PROCEDURE — 93010 ELECTROCARDIOGRAM REPORT: CPT

## 2017-11-18 PROCEDURE — 99292 CRITICAL CARE ADDL 30 MIN: CPT

## 2017-11-18 PROCEDURE — 71010: CPT | Mod: 26,77

## 2017-11-18 PROCEDURE — 99291 CRITICAL CARE FIRST HOUR: CPT

## 2017-11-18 RX ORDER — CEFEPIME 1 G/1
INJECTION, POWDER, FOR SOLUTION INTRAMUSCULAR; INTRAVENOUS
Qty: 0 | Refills: 0 | Status: DISCONTINUED | OUTPATIENT
Start: 2017-11-18 | End: 2017-11-18

## 2017-11-18 RX ORDER — CHLORHEXIDINE GLUCONATE 213 G/1000ML
1 SOLUTION TOPICAL DAILY
Qty: 0 | Refills: 0 | Status: DISCONTINUED | OUTPATIENT
Start: 2017-11-18 | End: 2018-01-10

## 2017-11-18 RX ORDER — FENTANYL CITRATE 50 UG/ML
100 INJECTION INTRAVENOUS ONCE
Qty: 0 | Refills: 0 | Status: DISCONTINUED | OUTPATIENT
Start: 2017-11-18 | End: 2017-11-18

## 2017-11-18 RX ORDER — CHLORHEXIDINE GLUCONATE 213 G/1000ML
15 SOLUTION TOPICAL
Qty: 0 | Refills: 0 | Status: DISCONTINUED | OUTPATIENT
Start: 2017-11-18 | End: 2017-11-22

## 2017-11-18 RX ORDER — DOCUSATE SODIUM 100 MG
100 CAPSULE ORAL
Qty: 0 | Refills: 0 | Status: DISCONTINUED | OUTPATIENT
Start: 2017-11-18 | End: 2017-11-18

## 2017-11-18 RX ORDER — VANCOMYCIN HCL 1 G
1000 VIAL (EA) INTRAVENOUS EVERY 12 HOURS
Qty: 0 | Refills: 0 | Status: DISCONTINUED | OUTPATIENT
Start: 2017-11-18 | End: 2017-11-18

## 2017-11-18 RX ORDER — VALPROIC ACID (AS SODIUM SALT) 250 MG/5ML
500 SOLUTION, ORAL ORAL
Qty: 0 | Refills: 0 | Status: DISCONTINUED | OUTPATIENT
Start: 2017-11-18 | End: 2017-12-06

## 2017-11-18 RX ADMIN — Medication: at 06:00

## 2017-11-18 RX ADMIN — CHLORHEXIDINE GLUCONATE 15 MILLILITER(S): 213 SOLUTION TOPICAL at 23:58

## 2017-11-18 RX ADMIN — FENTANYL CITRATE 100 MICROGRAM(S): 50 INJECTION INTRAVENOUS at 04:00

## 2017-11-18 RX ADMIN — Medication 250 MILLIGRAM(S): at 23:31

## 2017-11-18 RX ADMIN — Medication 1 PUFF(S): at 10:47

## 2017-11-18 RX ADMIN — Medication 20 MILLIGRAM(S): at 06:00

## 2017-11-18 RX ADMIN — PIPERACILLIN AND TAZOBACTAM 25 GRAM(S): 4; .5 INJECTION, POWDER, LYOPHILIZED, FOR SOLUTION INTRAVENOUS at 04:00

## 2017-11-18 RX ADMIN — PANTOPRAZOLE SODIUM 40 MILLIGRAM(S): 20 TABLET, DELAYED RELEASE ORAL at 12:30

## 2017-11-18 RX ADMIN — BUDESONIDE AND FORMOTEROL FUMARATE DIHYDRATE 2 PUFF(S): 160; 4.5 AEROSOL RESPIRATORY (INHALATION) at 21:51

## 2017-11-18 RX ADMIN — Medication 20 MILLIGRAM(S): at 16:12

## 2017-11-18 RX ADMIN — ALBUTEROL 2 PUFF(S): 90 AEROSOL, METERED ORAL at 10:47

## 2017-11-18 RX ADMIN — CISATRACURIUM BESYLATE 9 MICROGRAM(S)/KG/MIN: 2 INJECTION INTRAVENOUS at 09:23

## 2017-11-18 RX ADMIN — BUDESONIDE AND FORMOTEROL FUMARATE DIHYDRATE 2 PUFF(S): 160; 4.5 AEROSOL RESPIRATORY (INHALATION) at 10:49

## 2017-11-18 RX ADMIN — Medication 250 MILLIGRAM(S): at 14:00

## 2017-11-18 RX ADMIN — Medication 1: at 17:53

## 2017-11-18 RX ADMIN — VORICONAZOLE 125 MILLIGRAM(S): 10 INJECTION, POWDER, LYOPHILIZED, FOR SOLUTION INTRAVENOUS at 16:08

## 2017-11-18 RX ADMIN — Medication 1 PUFF(S): at 21:51

## 2017-11-18 RX ADMIN — FENTANYL CITRATE 100 MICROGRAM(S): 50 INJECTION INTRAVENOUS at 04:15

## 2017-11-18 RX ADMIN — Medication 500 MILLIGRAM(S): at 20:54

## 2017-11-18 RX ADMIN — Medication 20 MILLIGRAM(S): at 23:30

## 2017-11-18 RX ADMIN — MIDAZOLAM HYDROCHLORIDE 5 MG/HR: 1 INJECTION, SOLUTION INTRAMUSCULAR; INTRAVENOUS at 09:26

## 2017-11-18 RX ADMIN — Medication 1 PUFF(S): at 16:20

## 2017-11-18 RX ADMIN — ATORVASTATIN CALCIUM 80 MILLIGRAM(S): 80 TABLET, FILM COATED ORAL at 23:30

## 2017-11-18 RX ADMIN — PIPERACILLIN AND TAZOBACTAM 25 GRAM(S): 4; .5 INJECTION, POWDER, LYOPHILIZED, FOR SOLUTION INTRAVENOUS at 23:30

## 2017-11-18 RX ADMIN — VORICONAZOLE 125 MILLIGRAM(S): 10 INJECTION, POWDER, LYOPHILIZED, FOR SOLUTION INTRAVENOUS at 02:53

## 2017-11-18 RX ADMIN — Medication 1 APPLICATION(S): at 23:59

## 2017-11-18 RX ADMIN — PIPERACILLIN AND TAZOBACTAM 25 GRAM(S): 4; .5 INJECTION, POWDER, LYOPHILIZED, FOR SOLUTION INTRAVENOUS at 14:00

## 2017-11-18 RX ADMIN — SODIUM CHLORIDE 30 MILLILITER(S): 9 INJECTION INTRAMUSCULAR; INTRAVENOUS; SUBCUTANEOUS at 09:27

## 2017-11-18 RX ADMIN — Medication 1 PUFF(S): at 04:02

## 2017-11-18 RX ADMIN — Medication 1: at 01:39

## 2017-11-18 RX ADMIN — FENTANYL CITRATE 7.5 MICROGRAM(S)/KG/HR: 50 INJECTION INTRAVENOUS at 09:28

## 2017-11-18 RX ADMIN — ALBUTEROL 2 PUFF(S): 90 AEROSOL, METERED ORAL at 16:20

## 2017-11-18 NOTE — PROGRESS NOTE ADULT - SUBJECTIVE AND OBJECTIVE BOX
OSCAR CHACKO            MRN-[id number]         No Known Allergies             47 M PMH of sarcoidosis, HTN, COPD, and asthma who was initially admitted on 09/10 w cc of severe leg spasms X two days  On 9/12 at 4 am, a RRT and Stroke codes were called as the patient became unresponsive.  He was transferred to MICU and then intubated for airway protection.   The patient was found to have a new infarct on MRI and also found to have a patent PFO on this admission as well. He was weaned off vent on 09/13 and transferred to the floors   There was concern patient was having paroxysmal afib, so he was started on Eliquis and received 2 doses of Eliquis on 09/19.  However, on 09/20, the patient had a cough productive of  fresh blood, about 1/2 cup of blood. He underwent Bronchoscopy with bronchoalveolar lavage of both sides and epi injection on 09/21  However, he developed hemoptysis again and underwent urgent LULectomy, complicated with post op bleeding.  [id number]:  Intubated fro airway protection  [id number]:  Extubated  [id number]:  FOB 2/2 large hemoptysis  [id number]:   Left VATS and DAVID lobectomy 9/24/17   [id number]:   Re-op L thoracotomy, evacuation of hemothorax (600cc clot, 400cc blood) no obvious source of bleeding      Re-op L thoracotomy, evacuation of hemothorax; RTOR x2 100cc clot; 200cc blood: bleeding posterior intercostal arteries identified and cauterized 9/25/17   [id number]:   L Pigtail catheter (IR) for L apical PTX.  (Transferred back to ICU 2/2 increased SOB)  [id number]   Bedside talc pleurodesis    Pt eventually discharged to rehab - readmitted on [id number]    [id number]:   Bronchoscopy with bronchoalveolar lavage  in OR- Remained intubated  [id number]:   Repeat bronchoscopy in ICU for persistent hemoptysis via ETT; bronchial block placement into left main bronchus  [id number]:   IR left bronchial artery embolization, Bronchoscopy with BAL post IR embolization - w/o evidence of active bleeding  [id number]:   Repeat bronchoscopy in ICU  via ETT; No evidence of active bleeding. 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 ,       Home Medications:  acetaminophen 325 mg oral tablet: 2 tab(s) orally every 6 hours, As needed, Mild Pain (1 - 3) (02 Nov 2017 11:46)  apixaban 5 mg oral tablet: 1 tab(s) orally every 12 hours (02 Nov 2017 11:49)  atorvastatin 80 mg oral tablet: 1 tab(s) orally once a day (at bedtime) (02 Nov 2017 11:49)  budesonide-formoterol 160 mcg-4.5 mcg/inh inhalation aerosol: 2 puff(s) inhaled 2 times a day (02 Nov 2017 11:49)  divalproex sodium 500 mg oral delayed release tablet: 1 tab(s) orally 2 times a day (02 Nov 2017 11:49)  docusate sodium 100 mg oral capsule: 1 cap(s) orally 2 times a day (02 Nov 2017 11:49)  ferrous sulfate 325 mg (65 mg elemental iron) oral tablet: 1  orally 3 times a day (02 Nov 2017 11:49)  folic acid 1 mg oral tablet: 1 tab(s) orally once a day (02 Nov 2017 11:50)  ipratropium 500 mcg/2.5 mL inhalation solution: 2.5 milliliter(s) inhaled every 6 hours (02 Nov 2017 11:49)  levalbuterol 0.63 mg/3 mL inhalation solution: 3 milliliter(s) inhaled every 6 hours (02 Nov 2017 11:49)  lidocaine 5% topical film: Apply topically to affected area once a day (02 Nov 2017 11:49)  oxyCODONE 10 mg oral tablet: 1 tab(s) orally every 4 hours, As needed, Severe Pain (7 - 10) (02 Nov 2017 11:46)  oxyCODONE 5 mg oral tablet: 1 tab(s) orally every 4 hours, As needed, Moderate Pain (4 - 6) (02 Nov 2017 11:46)  pantoprazole 40 mg oral delayed release tablet: 1 tab(s) orally 2 times a day (before meals) (02 Nov 2017 11:50)  polyethylene glycol 3350 oral powder for reconstitution: 17 gram(s) orally once a day (at bedtime) (02 Nov 2017 11:50)  predniSONE: 30 milligram(s) orally once a day until Nov 6th then 20mg daily x 5 days then 10mg daily indefinitely.  (02 Nov 2017 11:46)      PAST MEDICAL & SURGICAL HISTORY:  History of pneumothorax: History of 3 prior pneumonthoracies.  COPD (chronic obstructive pulmonary disease)  Asthma  Hypertension  Sarcoidosis  History of thoracotomy: 9/24/17 Left thoracotomy, Left upper lobectomy   9/25/17 Reop left thoracotomy, evacuation of left hemothorax      ICU Vital Signs Last 24 Hrs  T(C): 36.6 (18 Nov 2017 14:00), Max: 36.7 (18 Nov 2017 12:00)  T(F): 97.9 (18 Nov 2017 14:00), Max: 98.1 (18 Nov 2017 12:00)  HR: 79 (18 Nov 2017 16:20) (45 - 133)  BP: 131/89 (18 Nov 2017 16:00) (99/76 - 131/89)  BP(mean): 98 (18 Nov 2017 16:00) (66 - 98)  ABP: 147/97 (18 Nov 2017 16:00) (96/59 - 167/103)  ABP(mean): 115 (18 Nov 2017 16:00) (72 - 125)  RR: 24 (18 Nov 2017 16:00) (11 - 24)  SpO2: 100% (18 Nov 2017 16:20) (97% - 100%)    I&O's Summary    17 Nov 2017 07:01  -  18 Nov 2017 07:00  --------------------------------------------------------  IN: 2556.7 mL / OUT: 1385 mL / NET: 1171.7 mL    18 Nov 2017 07:01  -  18 Nov 2017 16:59  --------------------------------------------------------  IN: 1115 mL / OUT: 355 mL / NET: 760 mL      CAPILLARY BLOOD GLUCOSE      POCT Blood Glucose.: 158 mg/dL (18 Nov 2017 05:52)      MEDICATIONS  (STANDING):  atorvastatin 80 milliGRAM(s) Oral at bedtime  benzonatate 100 milliGRAM(s) Oral three times a day  buDESOnide 160 MICROgram(s)/formoterol 4.5 MICROgram(s) Inhaler 2 Puff(s) Inhalation two times a day  cisatracurium Infusion 2 MICROgram(s)/kG/Min (9 mL/Hr) IV Continuous <Continuous>  dexmedetomidine Infusion 0.3 MICROgram(s)/kG/Hr (5.625 mL/Hr) IV Continuous <Continuous>  dextrose 5%. 1000 milliLiter(s) (50 mL/Hr) IV Continuous <Continuous>  dextrose 50% Injectable 12.5 Gram(s) IV Push once  dextrose 50% Injectable 25 Gram(s) IV Push once  dextrose 50% Injectable 25 Gram(s) IV Push once  diVALproex  milliGRAM(s) Oral two times a day  docusate sodium 100 milliGRAM(s) Oral two times a day  fentaNYL   Infusion 1 MICROgram(s)/kG/Hr (7.5 mL/Hr) IV Continuous <Continuous>  ferrous    sulfate 325 milliGRAM(s) Oral three times a day with meals  folic acid 1 milliGRAM(s) Oral daily  insulin lispro (HumaLOG) corrective regimen sliding scale   SubCutaneous every 6 hours  ipratropium 17 MICROgram(s) HFA Inhaler 1 Puff(s) Inhalation every 6 hours  lidocaine   Patch 1 Patch Transdermal daily  methylPREDNISolone sodium succinate Injectable 20 milliGRAM(s) IV Push every 8 hours  midazolam Infusion 5 mG/Hr (5 mL/Hr) IV Continuous <Continuous>  pantoprazole  Injectable 40 milliGRAM(s) IV Push daily  phenylephrine    Infusion 0.1 MICROgram(s)/kG/Min (2.813 mL/Hr) IV Continuous <Continuous>  piperacillin/tazobactam IVPB. 3.375 Gram(s) IV Intermittent every 8 hours  polyethylene glycol 3350 17 Gram(s) Oral at bedtime  sodium chloride 0.9% Bolus 250 milliLiter(s) IV Bolus once  sodium chloride 0.9%. 1000 milliLiter(s) (30 mL/Hr) IV Continuous <Continuous>  vancomycin  IVPB 1000 milliGRAM(s) IV Intermittent every 12 hours  voriconazole IVPB 300 milliGRAM(s) IV Intermittent every 12 hours    MEDICATIONS  (PRN):  acetaminophen  IVPB. 1000 milliGRAM(s) IV Intermittent once PRN Moderate Pain (4 - 6)  ALBUTerol    90 MICROgram(s) HFA Inhaler 2 Puff(s) Inhalation every 6 hours PRN Shortness of Breath and/or Wheezing  dextrose Gel 1 Dose(s) Oral once PRN Blood Glucose LESS THAN 70 milliGRAM(s)/deciliter  glucagon  Injectable 1 milliGRAM(s) IntraMuscular once PRN Glucose LESS THAN 70 milligrams/deciliter      Mode: AC/ CMV (Assist Control/ Continuous Mandatory Ventilation)  RR (machine): 24  TV (machine): 350  FiO2: 40  PEEP: 5  MAP: 17  PIP: 43      Physical exam:                             General:               Pt is sedated and   paralyzed                                           Neuro:                  Sedated                           Cardiovascular:   S1 & S2, regular                           Respiratory:         Air entry is decreased at left apex , has bilateral conducted sounds                           GI:                          Soft, nondistended and nontender, Bowel sounds hypoactive                            Ext:                        No cyanosis or edema    Labs:                                                                           9.2    14.32 )-----------( 77       ( 18 Nov 2017 03:40 )             29.7             11-18    136  |  95<L>  |  19  ----------------------------<  157<H>  4.7   |  32<H>  |  0.81    Ca    9.1      18 Nov 2017 03:40  Phos  4.0     11-18  Mg     1.7     11-18    TPro  5.8<L>  /  Alb  2.8<L>  /  TBili  0.2  /  DBili  x   /  AST  9   /  ALT  9   /  AlkPhos  57  11-17                  PT/INR - ( 18 Nov 2017 03:40 )   PT: 13.0 SEC;   INR: 1.16          PTT - ( 18 Nov 2017 03:40 )  PTT:25.9 SEC  LIVER FUNCTIONS - ( 17 Nov 2017 03:50 )  Alb: 2.8 g/dL / Pro: 5.8 g/dL / ALK PHOS: 57 u/L / ALT: 9 u/L / AST: 9 u/L / GGT: x               CXR:  < from: Xray Chest 1 View AP- PORTABLE-Urgent (11.18.17 @ 08:59) >  Cardiac silhouette normal in size. Endotracheal tube tip overlies the   thoracic inlet. Unchanged chronic sarcoidosis related changes with   left-sided surgical sutures and moderate left pneumothorax.      Plan:    General: 47yMale Hx of Sarcoidosis, hemoptysis, respiratory failure currently intubated and sedated                            Neuro:                                         Pain control with Fentanyl drip                            Cardiovascular:                                          Continue hemodynamic monitoring.                            Respiratory:                                         Pt is on full vent support.                                           Keep pt sedated and paralyzed as airway pressures are high and difficult to ventilate.                                                                Continue bronchodilators, pulmonary toilet     Dr. Caldera to discuss with pulmonary regarding possible candidate for lung transplant.     Sarcoidosis: Continue Solumedrol 20mg Q 8hrs     Has big space at the left apex - discussed with Dr. Alvarado, no intervention at this time                            GI                                         Start OGT feeds and increase as tolerated                                         Continue GI prophylaxis with Protonix                                         Continue Zofran / Reglan for nausea - PRN	                                                                 Renal:                                         Continue LR 30cc/hr                                         Monitor I/Os and electrolytes                                         [place]                                                  Hem/ Onc:                                                                                  Monitor chest tube output &  signs of bleeding.                                          Follow CBC in AM                           Infectious disease:                                            Aspergilloma - On voriconazole                                           All surgical incision / chest tube  sites look clean                            Endocrine                                             Continue Accu-Checks with coverage    Pt is on SQ Heparin and Venodynes for DVT prophylaxis.     Pertinent clinical, laboratory, radiographic, hemodynamic, echocardiographic, respiratory data, microbiologic data and chart were reviewed and analyzed frequently throughout the course of the day and night.  Patient seen, examined and plan discussed with CT Surgeon / CTICU team during rounds.    Pt's status discussed with family at bedside, updated status    I have spent 90  minutes of critical care time with this pt between  8 am and 11.59 pm        Len Kim MD

## 2017-11-18 NOTE — PROGRESS NOTE ADULT - SUBJECTIVE AND OBJECTIVE BOX
Patient is a 47y old  Male who presents with a chief complaint of hemoptysis (09 Nov 2017 00:32)    Pertinent ROS:     MEDICATIONS  (STANDING):  atorvastatin 80 milliGRAM(s) Oral at bedtime  benzonatate 100 milliGRAM(s) Oral three times a day  buDESOnide 160 MICROgram(s)/formoterol 4.5 MICROgram(s) Inhaler 2 Puff(s) Inhalation two times a day  cisatracurium Infusion 2 MICROgram(s)/kG/Min (9 mL/Hr) IV Continuous <Continuous>  dexmedetomidine Infusion 0.3 MICROgram(s)/kG/Hr (5.625 mL/Hr) IV Continuous <Continuous>  dextrose 5%. 1000 milliLiter(s) (50 mL/Hr) IV Continuous <Continuous>  dextrose 50% Injectable 12.5 Gram(s) IV Push once  dextrose 50% Injectable 25 Gram(s) IV Push once  dextrose 50% Injectable 25 Gram(s) IV Push once  diVALproex  milliGRAM(s) Oral two times a day  docusate sodium 100 milliGRAM(s) Oral two times a day  fentaNYL   Infusion 1 MICROgram(s)/kG/Hr (7.5 mL/Hr) IV Continuous <Continuous>  ferrous    sulfate 325 milliGRAM(s) Oral three times a day with meals  folic acid 1 milliGRAM(s) Oral daily  insulin lispro (HumaLOG) corrective regimen sliding scale   SubCutaneous every 6 hours  ipratropium 17 MICROgram(s) HFA Inhaler 1 Puff(s) Inhalation every 6 hours  lidocaine   Patch 1 Patch Transdermal daily  methylPREDNISolone sodium succinate Injectable 20 milliGRAM(s) IV Push every 8 hours  midazolam Infusion 5 mG/Hr (5 mL/Hr) IV Continuous <Continuous>  pantoprazole  Injectable 40 milliGRAM(s) IV Push daily  phenylephrine    Infusion 0.1 MICROgram(s)/kG/Min (2.813 mL/Hr) IV Continuous <Continuous>  piperacillin/tazobactam IVPB. 3.375 Gram(s) IV Intermittent every 8 hours  polyethylene glycol 3350 17 Gram(s) Oral at bedtime  sodium chloride 0.9% Bolus 250 milliLiter(s) IV Bolus once  sodium chloride 0.9%. 1000 milliLiter(s) (30 mL/Hr) IV Continuous <Continuous>  vancomycin  IVPB 1000 milliGRAM(s) IV Intermittent every 12 hours  voriconazole IVPB 300 milliGRAM(s) IV Intermittent every 12 hours    MEDICATIONS  (PRN):  acetaminophen  IVPB. 1000 milliGRAM(s) IV Intermittent once PRN Moderate Pain (4 - 6)  ALBUTerol    90 MICROgram(s) HFA Inhaler 2 Puff(s) Inhalation every 6 hours PRN Shortness of Breath and/or Wheezing  dextrose Gel 1 Dose(s) Oral once PRN Blood Glucose LESS THAN 70 milliGRAM(s)/deciliter  glucagon  Injectable 1 milliGRAM(s) IntraMuscular once PRN Glucose LESS THAN 70 milligrams/deciliter    Vital Signs Last 24 Hrs  T(C): 36.7 (18 Nov 2017 12:00), Max: 36.7 (18 Nov 2017 12:00)  T(F): 98.1 (18 Nov 2017 12:00), Max: 98.1 (18 Nov 2017 12:00)  HR: 101 (18 Nov 2017 12:00) (45 - 133)  BP: 109/74 (18 Nov 2017 11:00) (99/76 - 115/90)  BP(mean): 80 (18 Nov 2017 11:00) (66 - 95)  RR: 24 (18 Nov 2017 12:00) (11 - 24)  SpO2: 100% (18 Nov 2017 12:00) (98% - 100%)  Mode: AC/ CMV (Assist Control/ Continuous Mandatory Ventilation)  RR (machine): 24  TV (machine): 350  FiO2: 40  PEEP: 5  MAP: 14  PIP: 33    I&O's Summary    17 Nov 2017 07:01  -  18 Nov 2017 07:00  --------------------------------------------------------  IN: 2556.7 mL / OUT: 1385 mL / NET: 1171.7 mL    18 Nov 2017 07:01  -  18 Nov 2017 12:52  --------------------------------------------------------  IN: 314 mL / OUT: 160 mL / NET: 154 mL        Physical Exam:   Const:  Respiratory:   CVS:  GI:  CNS  SKIN  :     Labs:  ABG - ( 18 Nov 2017 07:00 )  pH: 7.42  /  pCO2: 52    /  pO2: 174   / HCO3: 32    / Base Excess: 8.4   /  SaO2: 99.7                                9.2    14.32 )-----------( 77       ( 18 Nov 2017 03:40 )             29.7     11-18    136  |  95<L>  |  19  ----------------------------<  157<H>  4.7   |  32<H>  |  0.81    Ca    9.1      18 Nov 2017 03:40  Phos  4.0     11-18  Mg     1.7     11-18    TPro  5.8<L>  /  Alb  2.8<L>  /  TBili  0.2  /  DBili  x   /  AST  9   /  ALT  9   /  AlkPhos  57  11-17    CAPILLARY BLOOD GLUCOSE      POCT Blood Glucose.: 158 mg/dL (18 Nov 2017 05:52)  POCT Blood Glucose.: 166 mg/dL (18 Nov 2017 01:37)  POCT Blood Glucose.: 132 mg/dL (17 Nov 2017 20:09)    LIVER FUNCTIONS - ( 17 Nov 2017 03:50 )  Alb: 2.8 g/dL / Pro: 5.8 g/dL / ALK PHOS: 57 u/L / ALT: 9 u/L / AST: 9 u/L / GGT: x           PT/INR - ( 18 Nov 2017 03:40 )   PT: 13.0 SEC;   INR: 1.16          PTT - ( 18 Nov 2017 03:40 )  PTT:25.9 SEC    D DImer  Cultures:           Wound culture:                11-15 @ 16:22  Organism --  Culture w/ gram stain --  Specimen Source BRONCHIAL LAVAGE    Wound culture:                11-15 @ 09:22  Organism --  Culture w/ gram stain --  Specimen Source BLOOD    Wound culture:                11-14 @ 00:44  Organism --  Culture w/ gram stain --  Specimen Source SPUTUM    Wound culture:                11-13 @ 13:26  Organism --  Culture w/ gram stain --  Specimen Source URINE MIDSTREAM    Wound culture:                11-13 @ 13:20  Organism --  Culture w/ gram stain --  Specimen Source BLOOD PERIPHERAL      Abscess culture:             11-15 @ 16:22  Organism --  Gram Stain --  Specimen Source BRONCHIAL LAVAGE    Abscess culture:             11-15 @ 09:22  Organism --  Gram Stain --  Specimen Source BLOOD    Abscess culture:             11-14 @ 00:44  Organism --  Gram Stain --  Specimen Source SPUTUM    Abscess culture:             11-13 @ 13:26  Organism --  Gram Stain --  Specimen Source URINE MIDSTREAM    Abscess culture:             11-13 @ 13:20  Organism --  Gram Stain --  Specimen Source BLOOD PERIPHERAL        Tissue culture:           11-15 @ 16:22  Organism --  Gram Stain --  Specimen Source BRONCHIAL LAVAGE    Tissue culture:           11-15 @ 09:22  Organism --  Gram Stain --  Specimen Source BLOOD    Tissue culture:           11-14 @ 00:44  Organism --  Gram Stain --  Specimen Source SPUTUM    Tissue culture:           11-13 @ 13:26  Organism --  Gram Stain --  Specimen Source URINE MIDSTREAM    Tissue culture:           11-13 @ 13:20  Organism --  Gram Stain --  Specimen Source BLOOD PERIPHERAL      Body Fluid Smear & Culture:                        11-15 @ 16:22  AFB Smear  --  Culture Acid Fast Body Fluid w/ Smear  --  Culture Acid Fast Smear Concentrated     AFB SMEAR= NO ACID FAST BACILLI SEEN    Culture Results:     --  Specimen Source BRONCHIAL LAVAGE    Body Fluid Smear & Culture:                        11-15 @ 09:22  AFB Smear  --  Culture Acid Fast Body Fluid w/ Smear  --  Culture Acid Fast Smear Concentrated   --    Culture Results:     --  Specimen Source BLOOD    Body Fluid Smear & Culture:                        11-14 @ 00:44  AFB Smear  --  Culture Acid Fast Body Fluid w/ Smear  --  Culture Acid Fast Smear Concentrated   --    Culture Results:     --  Specimen Source SPUTUM    Body Fluid Smear & Culture:                        11-13 @ 13:26  AFB Smear  --  Culture Acid Fast Body Fluid w/ Smear  --  Culture Acid Fast Smear Concentrated   --    Culture Results:     --  Specimen Source URINE MIDSTREAM    Body Fluid Smear & Culture:                        11-13 @ 13:20  AFB Smear  --  Culture Acid Fast Body Fluid w/ Smear  --  Culture Acid Fast Smear Concentrated   --    Culture Results:     --  Specimen Source BLOOD PERIPHERAL        Rapid Respiratory Viral Panel Result        11-13 @ 15:46  Rapid RVP --  Coronovirus --  Adenovirus NOT DETECTED  Bordetella Pertussis NOT DETECTED  Chlamydia Pneumonia NOT DETECTED  Entero/RhinovirusNOT DETECTED  HKU1 Coronovirus --  HMPV Coronovirus NOT DETECTED  Influenza A NOT DETECTED (any subtype)  Influenza AH1 NOT DETECTED  Influenza AH1 2009 NOT DETECTED  Influenza AH3 NOT DETECTED  Influenza B NOT DETECTED  Mycoplasma Pneumoniae NOT DETECTED This nucleic acid amplification assay was performed using  the [employer] FilmArray. The following pathogens are tested  for: Adenovirus, Coronavirus 229E, Coronavirus HKU1,  Coronavirus NL63, Coronavirus OC43, Human Metapneumovirus  (HMPV), Rhinovirus/Enterovirus, Influenza A H1, Influenza A  H1 2009 (Pandemic H1 2009), Influenza A H3, Influenza A (Flu  A) subtype not identified, Influenza B, Parainfluenza Virus  (types 1, 2, 3, 4), Respiratory Syncytial Virus (RSV),  Bordetella pertussis, Chlamydophila pneumoniae, and  Mycoplasma pneumoniae. A negative FilmArray result does not  always exclude the possibility of viral or bacterial  infection. Laboratory results should always be interpreted  in the context of clinical findings.  NL63 Coronovirus --  OC43 Coronovirus --  Parainfluenza 1 NOT DETECTED  Parainfluenza 2 NOT DETECTED  Parainfluenza 3 NOT DETECTED  Parainfluenza 4 NOT DETECTED  Resp Syncytial Virus NOT DETECTED    Studies  Chest X-RAY < from: Xray Chest 1 View AP- PORTABLE-Urgent (11.18.17 @ 08:59) >  EXAM:  RAD CHEST PORTABLE URGENT        PROCEDURE DATE:  Nov 18 2017         INTERPRETATION:  EXAMINATION: RAD CHEST PORTABLE URGENT    CLINICAL INDICATION: PTX    TECHNIQUE: Frontal radiograph of the chest was obtained.    COMPARISON: 11/18/1979.    FINDINGS:     Cardiac silhouette normal in size. Endotracheal tube tip overlies the   thoracic inlet. Unchanged chronic sarcoidosis related changes with   left-sided surgical sutures and moderate left pneumothorax.    IMPRESSION:   No change from prior study.    < end of copied text >    CT SCAN Chest  < from: CT Chest No Cont (09.11.17 @ 20:24) >  EXAM:  CT CHEST        PROCEDURE DATE:  Sep 11 2017         INTERPRETATION:  Clinical indications: Sarcoidosis.    Axial CT images of the chest are obtained without intravenous   administration of contrast. MIP images are also submitted.    No prior chest CTs are available for comparison.    There are no pathologically enlarged bilateral axillary lymph nodes.   There are multiple small mediastinal nonspecific lymph nodes nodes   measuring up to 9 mm in short axis. The heart size is normal. There is no   pericardial effusion. The main pulmonary artery measures about 3.6 cm.   There are no pleural effusions.    Evaluation of the upper abdominal organs demonstrate high density within   the partially imaged gallbladder which may be secondary tovicarious   excretion as the patient was injected with contrast on the prior day.   There are nonobstructing bilateral intrarenal stones with the largest on   the right measuring about 4 mm.    Evaluation of the lungs demonstrate patchy and nodular opacities   scattered throughout the mid to upper lungs compatible with the patient's   given history of sarcoidosis. There are upper lobe  more centrally   located patchy opacities with associated retraction of the   bronchovascular bundles with associated volume loss of the upper lobes   and associated extensive architectural distortion. There are superimposed   bilateral mid to upper lung cysts again likely related to the patient's   given history of sarcoidosis. There are no central endobronchial lesions.    Evaluation of the bones demonstrate no significant abnormality.    IMPRESSION: Pulmonary nodular or patchy opacities throughout both lungs   with associated volume loss and architectural distortion as described   above related to sarcoidosis.    Enlargement of the main pulmonary artery can be seen in the setting of   pulmonary arterial hypertension.    < end of copied text >   < from: CT Angio Chest w/ IV Cont (11.10.17 @ 11:10) >  EXAM:  CT ANGIO CHEST (W)AW IC        PROCEDURE DATE:  Nov 10 2017         INTERPRETATION:  CLINICAL INFORMATION: Hemoptysis, history of sarcoid and   aspergilloma, status post left upper lobectomy    COMPARISON: CT chest 11/3/2017    PROCEDURE:   CT Angiography of the Chest.  90 ml of Omnipaque 350 was injected intravenously. 10 ml were discarded.  Sagittal and coronal reformats were performed as well as MIPS.    FINDINGS:    CHEST:     PULMONARY ARTERY: Suboptimal study of the pulmonary artery and its distal   branches. No definite pulmonary embolism. The pulmonary artery is   enlarged, measuring 3.7 cm. No active pulmonary hemorrhage.  LUNGS AND LARGE AIRWAYS: Status post left upper lobe lobectomy. Unchanged   patchy peribronchial opacities, predominantly in the right upper lobe.   Unchanged cystic changes of the lungs bilaterally.  PLEURA: Unchanged left pneumothorax. Small left pleural effusion of mixed   density.  VESSELS: Normal left-sided aortic arch and descending aorta. No aneurysm.   No atherosclerotic changes.  HEART: Heart size is normal. Trace pericardial effusion.   MEDIASTINUM AND ERIC: No lymphadenopathy.  CHEST WALL AND LOWER NECK: Within normal limits.  VISUALIZED UPPER ABDOMEN: Bilateral nephrolithiasis without   hydronephrosis.  BONES: Unchanged left seventh rib thoracotomy defect. Mild degenerative   changes of the spine.    IMPRESSION: No active pulmonary hemorrhage.    Unchanged left pneumothorax.    Unchanged patchy peribronchial opacities and cystic changes, possibly   related to patient's known sarcoidosis.    Unchanged small left pleural effusion mixed density, possible element of   proteinaceous or hemorrhagic fluid.    < end of copied text >    CT Abdomen   Venous Dopplers: LE:   Others  < from: Transthoracic Echocardiogram (11.16.17 @ 13:25) >    Patient name: Miguel Dickens  YOB: 1970   Age: 47 (M)   MR#: [id number]  Study Date: 11/16/2017  [facility]  Study quality: Technically Difficult  Referring Physician: Rodrigue Caldera MD  Blood Pressure: 87/69 mmHg  Height: 180 cm  Weight: 97 kg  BSA: 2.2 m2  ------------------------------------------------------------------------  PROCEDURE: Transthoracic echocardiogram with 2-D, M-Mode  and complete spectral and color flow Doppler.  INDICATION: Chronic diastolic (congestive) heart failure  (I50.32)  ------------------------------------------------------------------------  DIMENSIONS:  Dimensions:     Normal Values:  LA:     3.6 cm    2.0 - 4.0 cm  Ao:     2.7 cm    2.0 - 3.8 cm  SEPTUM:   ---    0.6 - 1.2 cm  PWT:      ---     0.6 - 1.1 cm  LVIDd:    ---     3.0 - 5.6 cm  LVIDs:    ---     1.8 - 4.0 cm  ------------------------------------------------------------------------  OBSERVATIONS:  Mitral Valve: Normal mitral valve.  Aortic Root: Normal aortic root.  Aortic Valve: Aortic valve not well visualized; probably  normal.  Left Atrium: Normal left atrium.  Left Ventricle: Technically very difficult/limited left  ventricle views. Grossly  at least moderate global left  ventricular systolic dysfunction.  Right Heart: Normal right atrium. The right ventricle is  not well visualized appears grossly enlarged with decreased  function.  Normal tricuspid valve. Mild tricuspid  regurgitation. Normal pulmonic valve.  Pericardium/PleuraNormal pericardium with trace pericardial  effusion.  Hemodynamic: Estimated right ventricular systolic pressure  equals 50 mm Hg, assuming right atrial pressure equals 10  mm Hg, consistent with moderate pulmonary hypertension.  ------------------------------------------------------------------------  CONCLUSIONS:  1. Technically very difficult/limited left ventricle views.  Grossly  at least moderate global left ventricular systolic  dysfunction.  2. The right ventricle is not well visualized appears  grossly enlarged with decreased function.  3. Estimated pulmonary artery systolic pressure equals 50  mm Hg, assuming right atrial pressure equals 10  mm Hg,  consistent with moderate pulmonary hypertension.  *** Compared with echocardiogram of 9/26/2017, LV function  has decreased.    < end of copied text > Patient is a 47y old  Male who presents with a chief complaint of hemoptysis (09 Nov 2017 00:32)    Pertinent ROS: Pt seen and examined at the bedside. Pt is intubated., mechanically paralyzed and sedated with versed and Fentanyl  Pt's condition discussed with a primary RN and ICU attending. no pressor. hemodynamically stable.       MEDICATIONS  (STANDING):  atorvastatin 80 milliGRAM(s) Oral at bedtime  benzonatate 100 milliGRAM(s) Oral three times a day  buDESOnide 160 MICROgram(s)/formoterol 4.5 MICROgram(s) Inhaler 2 Puff(s) Inhalation two times a day  cisatracurium Infusion 2 MICROgram(s)/kG/Min (9 mL/Hr) IV Continuous <Continuous>  dexmedetomidine Infusion 0.3 MICROgram(s)/kG/Hr (5.625 mL/Hr) IV Continuous <Continuous>  dextrose 5%. 1000 milliLiter(s) (50 mL/Hr) IV Continuous <Continuous>  dextrose 50% Injectable 12.5 Gram(s) IV Push once  dextrose 50% Injectable 25 Gram(s) IV Push once  dextrose 50% Injectable 25 Gram(s) IV Push once  diVALproex  milliGRAM(s) Oral two times a day  docusate sodium 100 milliGRAM(s) Oral two times a day  fentaNYL   Infusion 1 MICROgram(s)/kG/Hr (7.5 mL/Hr) IV Continuous <Continuous>  ferrous    sulfate 325 milliGRAM(s) Oral three times a day with meals  folic acid 1 milliGRAM(s) Oral daily  insulin lispro (HumaLOG) corrective regimen sliding scale   SubCutaneous every 6 hours  ipratropium 17 MICROgram(s) HFA Inhaler 1 Puff(s) Inhalation every 6 hours  lidocaine   Patch 1 Patch Transdermal daily  methylPREDNISolone sodium succinate Injectable 20 milliGRAM(s) IV Push every 8 hours  midazolam Infusion 5 mG/Hr (5 mL/Hr) IV Continuous <Continuous>  pantoprazole  Injectable 40 milliGRAM(s) IV Push daily  phenylephrine    Infusion 0.1 MICROgram(s)/kG/Min (2.813 mL/Hr) IV Continuous <Continuous>  piperacillin/tazobactam IVPB. 3.375 Gram(s) IV Intermittent every 8 hours  polyethylene glycol 3350 17 Gram(s) Oral at bedtime  sodium chloride 0.9% Bolus 250 milliLiter(s) IV Bolus once  sodium chloride 0.9%. 1000 milliLiter(s) (30 mL/Hr) IV Continuous <Continuous>  vancomycin  IVPB 1000 milliGRAM(s) IV Intermittent every 12 hours  voriconazole IVPB 300 milliGRAM(s) IV Intermittent every 12 hours    MEDICATIONS  (PRN):  acetaminophen  IVPB. 1000 milliGRAM(s) IV Intermittent once PRN Moderate Pain (4 - 6)  ALBUTerol    90 MICROgram(s) HFA Inhaler 2 Puff(s) Inhalation every 6 hours PRN Shortness of Breath and/or Wheezing  dextrose Gel 1 Dose(s) Oral once PRN Blood Glucose LESS THAN 70 milliGRAM(s)/deciliter  glucagon  Injectable 1 milliGRAM(s) IntraMuscular once PRN Glucose LESS THAN 70 milligrams/deciliter    Vital Signs Last 24 Hrs  T(C): 36.7 (18 Nov 2017 12:00), Max: 36.7 (18 Nov 2017 12:00)  T(F): 98.1 (18 Nov 2017 12:00), Max: 98.1 (18 Nov 2017 12:00)  HR: 101 (18 Nov 2017 12:00) (45 - 133)  BP: 109/74 (18 Nov 2017 11:00) (99/76 - 115/90)  BP(mean): 80 (18 Nov 2017 11:00) (66 - 95)  RR: 24 (18 Nov 2017 12:00) (11 - 24)  SpO2: 100% (18 Nov 2017 12:00) (98% - 100%)  Mode: AC/ CMV (Assist Control/ Continuous Mandatory Ventilation)  RR (machine): 24  TV (machine): 350  FiO2: 40  PEEP: 5  MAP: 14  PIP: 33    I&O's Summary    17 Nov 2017 07:01  -  18 Nov 2017 07:00  --------------------------------------------------------  IN: 2556.7 mL / OUT: 1385 mL / NET: 1171.7 mL    18 Nov 2017 07:01  -  18 Nov 2017 12:52  --------------------------------------------------------  IN: 314 mL / OUT: 160 mL / NET: 154 mL    Physical Exam:   Const: NAD,  mechanically paralyzed with nimbex, sedated with Fentanyl and versed    Respiratory: coarse. wheezing.   CVS: S1, S2 no murmur. SR with tele  GI: abd soft non tender. BS present   CNS: paralyzed and sedated, pupils are sluggish to light   SKIN: normal turgor, normal color, dry not diaphoretic   : Pickens cathether    Labs:  ABG - ( 18 Nov 2017 07:00 )  pH: 7.42  /  pCO2: 52    /  pO2: 174   / HCO3: 32    / Base Excess: 8.4   /  SaO2: 99.7                         9.2    14.32 )-----------( 77       ( 18 Nov 2017 03:40 )             29.7     11-18    136  |  95<L>  |  19  ----------------------------<  157<H>  4.7   |  32<H>  |  0.81    Ca    9.1      18 Nov 2017 03:40  Phos  4.0     11-18  Mg     1.7     11-18    TPro  5.8<L>  /  Alb  2.8<L>  /  TBili  0.2  /  DBili  x   /  AST  9   /  ALT  9   /  AlkPhos  57  11-17    CAPILLARY BLOOD GLUCOSE      POCT Blood Glucose.: 158 mg/dL (18 Nov 2017 05:52)  POCT Blood Glucose.: 166 mg/dL (18 Nov 2017 01:37)  POCT Blood Glucose.: 132 mg/dL (17 Nov 2017 20:09)    LIVER FUNCTIONS - ( 17 Nov 2017 03:50 )  Alb: 2.8 g/dL / Pro: 5.8 g/dL / ALK PHOS: 57 u/L / ALT: 9 u/L / AST: 9 u/L / GGT: x           PT/INR - ( 18 Nov 2017 03:40 )   PT: 13.0 SEC;   INR: 1.16          PTT - ( 18 Nov 2017 03:40 )  PTT:25.9 SEC    D DImer  Cultures:           Wound culture:                11-15 @ 16:22  Organism --  Culture w/ gram stain --  Specimen Source BRONCHIAL LAVAGE    Wound culture:                11-15 @ 09:22  Organism --  Culture w/ gram stain --  Specimen Source BLOOD    Wound culture:                11-14 @ 00:44  Organism --  Culture w/ gram stain --  Specimen Source SPUTUM    Wound culture:                11-13 @ 13:26  Organism --  Culture w/ gram stain --  Specimen Source URINE MIDSTREAM    Wound culture:                11-13 @ 13:20  Organism --  Culture w/ gram stain --  Specimen Source BLOOD PERIPHERAL      Abscess culture:             11-15 @ 16:22  Organism --  Gram Stain --  Specimen Source BRONCHIAL LAVAGE    Abscess culture:             11-15 @ 09:22  Organism --  Gram Stain --  Specimen Source BLOOD    Abscess culture:             11-14 @ 00:44  Organism --  Gram Stain --  Specimen Source SPUTUM    Abscess culture:             11-13 @ 13:26  Organism --  Gram Stain --  Specimen Source URINE MIDSTREAM    Abscess culture:             11-13 @ 13:20  Organism --  Gram Stain --  Specimen Source BLOOD PERIPHERAL        Tissue culture:           11-15 @ 16:22  Organism --  Gram Stain --  Specimen Source BRONCHIAL LAVAGE    Tissue culture:           11-15 @ 09:22  Organism --  Gram Stain --  Specimen Source BLOOD    Tissue culture:           11-14 @ 00:44  Organism --  Gram Stain --  Specimen Source SPUTUM    Tissue culture:           11-13 @ 13:26  Organism --  Gram Stain --  Specimen Source URINE MIDSTREAM    Tissue culture:           11-13 @ 13:20  Organism --  Gram Stain --  Specimen Source BLOOD PERIPHERAL      Body Fluid Smear & Culture:                        11-15 @ 16:22  AFB Smear  --  Culture Acid Fast Body Fluid w/ Smear  --  Culture Acid Fast Smear Concentrated     AFB SMEAR= NO ACID FAST BACILLI SEEN    Culture Results:     --  Specimen Source BRONCHIAL LAVAGE    Body Fluid Smear & Culture:                        11-15 @ 09:22  AFB Smear  --  Culture Acid Fast Body Fluid w/ Smear  --  Culture Acid Fast Smear Concentrated   --    Culture Results:     --  Specimen Source BLOOD    Body Fluid Smear & Culture:                        11-14 @ 00:44  AFB Smear  --  Culture Acid Fast Body Fluid w/ Smear  --  Culture Acid Fast Smear Concentrated   --    Culture Results:     --  Specimen Source SPUTUM    Body Fluid Smear & Culture:                        11-13 @ 13:26  AFB Smear  --  Culture Acid Fast Body Fluid w/ Smear  --  Culture Acid Fast Smear Concentrated   --    Culture Results:     --  Specimen Source URINE MIDSTREAM    Body Fluid Smear & Culture:                        11-13 @ 13:20  AFB Smear  --  Culture Acid Fast Body Fluid w/ Smear  --  Culture Acid Fast Smear Concentrated   --    Culture Results:     --  Specimen Source BLOOD PERIPHERAL        Rapid Respiratory Viral Panel Result        11-13 @ 15:46  Rapid RVP --  Coronovirus --  Adenovirus NOT DETECTED  Bordetella Pertussis NOT DETECTED  Chlamydia Pneumonia NOT DETECTED  Entero/RhinovirusNOT DETECTED  HKU1 Coronovirus --  HMPV Coronovirus NOT DETECTED  Influenza A NOT DETECTED (any subtype)  Influenza AH1 NOT DETECTED  Influenza AH1 2009 NOT DETECTED  Influenza AH3 NOT DETECTED  Influenza B NOT DETECTED  Mycoplasma Pneumoniae NOT DETECTED This nucleic acid amplification assay was performed using  the [employer] Film[employer]. The following pathogens are tested  for: Adenovirus, Coronavirus 229E, Coronavirus HKU1,  Coronavirus NL63, Coronavirus OC43, Human Metapneumovirus  (HMPV), Rhinovirus/Enterovirus, Influenza A H1, Influenza A  H1 2009 (Pandemic H1 2009), Influenza A H3, Influenza A (Flu  A) subtype not identified, Influenza B, Parainfluenza Virus  (types 1, 2, 3, 4), Respiratory Syncytial Virus (RSV),  Bordetella pertussis, Chlamydophila pneumoniae, and  Mycoplasma pneumoniae. A negative FilmArray result does not  always exclude the possibility of viral or bacterial  infection. Laboratory results should always be interpreted  in the context of clinical findings.  NL63 Coronovirus --  OC43 Coronovirus --  Parainfluenza 1 NOT DETECTED  Parainfluenza 2 NOT DETECTED  Parainfluenza 3 NOT DETECTED  Parainfluenza 4 NOT DETECTED  Resp Syncytial Virus NOT DETECTED    Studies  Chest X-RAY < from: Xray Chest 1 View AP- PORTABLE-Urgent (11.18.17 @ 08:59) >  EXAM:  RAD CHEST PORTABLE URGENT        PROCEDURE DATE:  Nov 18 2017         INTERPRETATION:  EXAMINATION: RAD CHEST PORTABLE URGENT    CLINICAL INDICATION: PTX    TECHNIQUE: Frontal radiograph of the chest was obtained.    COMPARISON: 11/18/1979.    FINDINGS:     Cardiac silhouette normal in size. Endotracheal tube tip overlies the   thoracic inlet. Unchanged chronic sarcoidosis related changes with   left-sided surgical sutures and moderate left pneumothorax.    IMPRESSION:   No change from prior study.    < end of copied text >    CT SCAN Chest  < from: CT Chest No Cont (09.11.17 @ 20:24) >  EXAM:  CT CHEST        PROCEDURE DATE:  Sep 11 2017         INTERPRETATION:  Clinical indications: Sarcoidosis.    Axial CT images of the chest are obtained without intravenous   administration of contrast. MIP images are also submitted.    No prior chest CTs are available for comparison.    There are no pathologically enlarged bilateral axillary lymph nodes.   There are multiple small mediastinal nonspecific lymph nodes nodes   measuring up to 9 mm in short axis. The heart size is normal. There is no   pericardial effusion. The main pulmonary artery measures about 3.6 cm.   There are no pleural effusions.    Evaluation of the upper abdominal organs demonstrate high density within   the partially imaged gallbladder which may be secondary tovicarious   excretion as the patient was injected with contrast on the prior day.   There are nonobstructing bilateral intrarenal stones with the largest on   the right measuring about 4 mm.    Evaluation of the lungs demonstrate patchy and nodular opacities   scattered throughout the mid to upper lungs compatible with the patient's   given history of sarcoidosis. There are upper lobe  more centrally   located patchy opacities with associated retraction of the   bronchovascular bundles with associated volume loss of the upper lobes   and associated extensive architectural distortion. There are superimposed   bilateral mid to upper lung cysts again likely related to the patient's   given history of sarcoidosis. There are no central endobronchial lesions.    Evaluation of the bones demonstrate no significant abnormality.    IMPRESSION: Pulmonary nodular or patchy opacities throughout both lungs   with associated volume loss and architectural distortion as described   above related to sarcoidosis.    Enlargement of the main pulmonary artery can be seen in the setting of   pulmonary arterial hypertension.    < end of copied text >   < from: CT Angio Chest w/ IV Cont (11.10.17 @ 11:10) >  EXAM:  CT ANGIO CHEST (W)AW IC        PROCEDURE DATE:  Nov 10 2017         INTERPRETATION:  CLINICAL INFORMATION: Hemoptysis, history of sarcoid and   aspergilloma, status post left upper lobectomy    COMPARISON: CT chest 11/3/2017    PROCEDURE:   CT Angiography of the Chest.  90 ml of Omnipaque 350 was injected intravenously. 10 ml were discarded.  Sagittal and coronal reformats were performed as well as MIPS.    FINDINGS:    CHEST:     PULMONARY ARTERY: Suboptimal study of the pulmonary artery and its distal   branches. No definite pulmonary embolism. The pulmonary artery is   enlarged, measuring 3.7 cm. No active pulmonary hemorrhage.  LUNGS AND LARGE AIRWAYS: Status post left upper lobe lobectomy. Unchanged   patchy peribronchial opacities, predominantly in the right upper lobe.   Unchanged cystic changes of the lungs bilaterally.  PLEURA: Unchanged left pneumothorax. Small left pleural effusion of mixed   density.  VESSELS: Normal left-sided aortic arch and descending aorta. No aneurysm.   No atherosclerotic changes.  HEART: Heart size is normal. Trace pericardial effusion.   MEDIASTINUM AND ERIC: No lymphadenopathy.  CHEST WALL AND LOWER NECK: Within normal limits.  VISUALIZED UPPER ABDOMEN: Bilateral nephrolithiasis without   hydronephrosis.  BONES: Unchanged left seventh rib thoracotomy defect. Mild degenerative   changes of the spine.    IMPRESSION: No active pulmonary hemorrhage.    Unchanged left pneumothorax.    Unchanged patchy peribronchial opacities and cystic changes, possibly   related to patient's known sarcoidosis.    Unchanged small left pleural effusion mixed density, possible element of   proteinaceous or hemorrhagic fluid.    < end of copied text >    CT Abdomen   Venous Dopplers: LE:   Others  < from: Transthoracic Echocardiogram (11.16.17 @ 13:25) >    Patient name: Miguel Dickens  YOB: 1970   Age: 47 (M)   MR#: [id number]  Study Date: 11/16/2017  [facility]  Study quality: Technically Difficult  Referring Physician: Rodrigue Caldera MD  Blood Pressure: 87/69 mmHg  Height: 180 cm  Weight: 97 kg  BSA: 2.2 m2  ------------------------------------------------------------------------  PROCEDURE: Transthoracic echocardiogram with 2-D, M-Mode  and complete spectral and color flow Doppler.  INDICATION: Chronic diastolic (congestive) heart failure  (I50.32)  ------------------------------------------------------------------------  DIMENSIONS:  Dimensions:     Normal Values:  LA:     3.6 cm    2.0 - 4.0 cm  Ao:     2.7 cm    2.0 - 3.8 cm  SEPTUM:   ---    0.6 - 1.2 cm  PWT:      ---     0.6 - 1.1 cm  LVIDd:    ---     3.0 - 5.6 cm  LVIDs:    ---     1.8 - 4.0 cm  ------------------------------------------------------------------------  OBSERVATIONS:  Mitral Valve: Normal mitral valve.  Aortic Root: Normal aortic root.  Aortic Valve: Aortic valve not well visualized; probably  normal.  Left Atrium: Normal left atrium.  Left Ventricle: Technically very difficult/limited left  ventricle views. Grossly  at least moderate global left  ventricular systolic dysfunction.  Right Heart: Normal right atrium. The right ventricle is  not well visualized appears grossly enlarged with decreased  function.  Normal tricuspid valve. Mild tricuspid  regurgitation. Normal pulmonic valve.  Pericardium/PleuraNormal pericardium with trace pericardial  effusion.  Hemodynamic: Estimated right ventricular systolic pressure  equals 50 mm Hg, assuming right atrial pressure equals 10  mm Hg, consistent with moderate pulmonary hypertension.  ------------------------------------------------------------------------  CONCLUSIONS:  1. Technically very difficult/limited left ventricle views.  Grossly  at least moderate global left ventricular systolic  dysfunction.  2. The right ventricle is not well visualized appears  grossly enlarged with decreased function.  3. Estimated pulmonary artery systolic pressure equals 50  mm Hg, assuming right atrial pressure equals 10  mm Hg,  consistent with moderate pulmonary hypertension.  *** Compared with echocardiogram of 9/26/2017, LV function  has decreased.    < end of copied text >

## 2017-11-18 NOTE — PROGRESS NOTE ADULT - PROBLEM SELECTOR PLAN 4
on prednisone.  11/10 continue steroid  11/11 on steroid  11/12 on oral steroid  11/13: cont oral steroids!!  11/14: Has pretty poor lungs secondary to steroids: Cont current therapy!!  11/16: on high dose steroids for now , would need to taper down the steroids to 10 or 20 mg of prednisone chronically:  11/17 steroid

## 2017-11-18 NOTE — PROGRESS NOTE ADULT - PROBLEM SELECTOR PLAN 2
? 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 ot from bronchiectasis? eliquis have been dced: needs card to se: In addition, he may need IR help with embolization, it his hemoptysis increases: He has had pleurodesis on the left side.  11/10 quantify hemoptysis. no AC. stable  11/11 no more hemoptysis. off AC  11/12 resolved. off AC  11/13: today had hemoptysis again----->localize site---> IR embolization: left a message for thoracic team  11/14: DW Dr Caldera: for repeat bronchoscopy to localize the site of bleeding and need IR help to embolize the culprit vessel!  11/15: started on broad spectrum antibiotics and for bronchoscopy today  11/16: s/p IR emobolization done: monitor for more hemoptysis  11/17: s/p bronchsocopy: reportedly old blood seen in airways!  11/18 s/p bronchoscopy in ICU via ETT. old clot. no active bleeding

## 2017-11-18 NOTE — PROGRESS NOTE ADULT - ATTENDING COMMENTS
remains critically ill: Cont current supportive care: Suggest to decrease IV steroids to 40 mg a day

## 2017-11-18 NOTE — PROGRESS NOTE ADULT - PROBLEM SELECTOR PLAN 3
Cont BD with inhaled steroids and oral steroids  11/114: Pts wheezing has significantly improved: he will need long term steroids as he starts wheezing once his steroids are dced!  11/15: Wheezing has increased: started on IV steroids high dose by thoracic team: Would suggest to decrease to 20 mg three times a day !  11/16: decrease steroids today to 20 mg three times day  11/17: try to decrease steroids in AM  11/18 Duoneb. intubated for acute respiratory distress with hypoxia

## 2017-11-18 NOTE — PROGRESS NOTE ADULT - PROBLEM SELECTOR PLAN 1
intubated: try to titrate as tolerated!  11/18 on ventilator support. ventilator Management/ weaning per CTICU team intubated: try to titrate as tolerated!  11/18 on ventilator support. Pt is sedated and paralyzed. defer ventilator Management/ weaning per CTICU team.

## 2017-11-18 NOTE — PROGRESS NOTE ADULT - PROBLEM SELECTOR PLAN 5
controlled.  11/10 stable  11/11 stable  11/12 stable  11/18 off meds. controlled.  11/10 stable  11/11 stable  11/12 stable  11/18 off meds. not on pressor

## 2017-11-18 NOTE — PROGRESS NOTE ADULT - PROBLEM SELECTOR PLAN 6
s/p pleurodesis.  11/10 s/p pleurodesis. Management defer to CTS  11/11 no intervention for now. per CTS.  11/13: has mod pneumothorax stable: defer to thoracic for any intervention!  11/12 no intervention planned. bronchoscopy in future per CTS  11/14: Pt still has left sided pneumothorax: s/p blood patch as well as the talc pleurodesis on the left side: Hard to do any other intervention : would defer to thoracic surgical team!!  11/18 management per CTS s/p pleurodesis.  11/10 s/p pleurodesis. Management defer to CTS  11/11 no intervention for now. per CTS.  11/13: has mod pneumothorax stable: defer to thoracic for any intervention!  11/12 no intervention planned. bronchoscopy in future per CTS  11/14: Pt still has left sided pneumothorax: s/p blood patch as well as the talc pleurodesis on the left side: Hard to do any other intervention : would defer to thoracic surgical team!!  11/18 management per CTS. not plan for Chest tube at this moment.

## 2017-11-19 LAB
ALBUMIN SERPL ELPH-MCNC: 2.6 G/DL — LOW (ref 3.3–5)
ALP SERPL-CCNC: 52 U/L — SIGNIFICANT CHANGE UP (ref 40–120)
ALT FLD-CCNC: 11 U/L — SIGNIFICANT CHANGE UP (ref 4–41)
AST SERPL-CCNC: 14 U/L — SIGNIFICANT CHANGE UP (ref 4–40)
BASE EXCESS BLDA CALC-SCNC: 7.5 MMOL/L — SIGNIFICANT CHANGE UP
BILIRUB SERPL-MCNC: 0.2 MG/DL — SIGNIFICANT CHANGE UP (ref 0.2–1.2)
BUN SERPL-MCNC: 16 MG/DL — SIGNIFICANT CHANGE UP (ref 7–23)
CALCIUM SERPL-MCNC: 8.3 MG/DL — LOW (ref 8.4–10.5)
CHLORIDE SERPL-SCNC: 95 MMOL/L — LOW (ref 98–107)
CO2 SERPL-SCNC: 30 MMOL/L — SIGNIFICANT CHANGE UP (ref 22–31)
CREAT SERPL-MCNC: 0.62 MG/DL — SIGNIFICANT CHANGE UP (ref 0.5–1.3)
GLUCOSE SERPL-MCNC: 177 MG/DL — HIGH (ref 70–99)
HCO3 BLDA-SCNC: 31 MMOL/L — HIGH (ref 22–26)
HCT VFR BLD CALC: 23.1 % — LOW (ref 39–50)
HCT VFR BLD CALC: 24.7 % — LOW (ref 39–50)
HGB BLD-MCNC: 7.8 G/DL — LOW (ref 13–17)
HGB BLD-MCNC: 7.8 G/DL — LOW (ref 13–17)
MCHC RBC-ENTMCNC: 30.1 PG — SIGNIFICANT CHANGE UP (ref 27–34)
MCHC RBC-ENTMCNC: 31.6 % — LOW (ref 32–36)
MCHC RBC-ENTMCNC: 32 PG — SIGNIFICANT CHANGE UP (ref 27–34)
MCHC RBC-ENTMCNC: 33.8 % — SIGNIFICANT CHANGE UP (ref 32–36)
MCV RBC AUTO: 94.7 FL — SIGNIFICANT CHANGE UP (ref 80–100)
MCV RBC AUTO: 95.4 FL — SIGNIFICANT CHANGE UP (ref 80–100)
NRBC # FLD: 0 — SIGNIFICANT CHANGE UP
NRBC # FLD: 0 — SIGNIFICANT CHANGE UP
PCO2 BLDA: 43 MMHG — SIGNIFICANT CHANGE UP (ref 35–48)
PH BLDA: 7.48 PH — HIGH (ref 7.35–7.45)
PLATELET # BLD AUTO: 61 K/UL — LOW (ref 150–400)
PLATELET # BLD AUTO: 61 K/UL — LOW (ref 150–400)
PMV BLD: 13.8 FL — HIGH (ref 7–13)
PMV BLD: 13.8 FL — HIGH (ref 7–13)
PO2 BLDA: 184 MMHG — HIGH (ref 83–108)
POTASSIUM SERPL-MCNC: 3.9 MMOL/L — SIGNIFICANT CHANGE UP (ref 3.5–5.3)
POTASSIUM SERPL-SCNC: 3.9 MMOL/L — SIGNIFICANT CHANGE UP (ref 3.5–5.3)
PROT SERPL-MCNC: 5.1 G/DL — LOW (ref 6–8.3)
RBC # BLD: 2.44 M/UL — LOW (ref 4.2–5.8)
RBC # BLD: 2.59 M/UL — LOW (ref 4.2–5.8)
RBC # FLD: 14.3 % — SIGNIFICANT CHANGE UP (ref 10.3–14.5)
RBC # FLD: 14.5 % — SIGNIFICANT CHANGE UP (ref 10.3–14.5)
SAO2 % BLDA: 99.7 % — HIGH (ref 95–99)
SODIUM SERPL-SCNC: 135 MMOL/L — SIGNIFICANT CHANGE UP (ref 135–145)
WBC # BLD: 8.04 K/UL — SIGNIFICANT CHANGE UP (ref 3.8–10.5)
WBC # BLD: 8.06 K/UL — SIGNIFICANT CHANGE UP (ref 3.8–10.5)
WBC # FLD AUTO: 8.04 K/UL — SIGNIFICANT CHANGE UP (ref 3.8–10.5)
WBC # FLD AUTO: 8.06 K/UL — SIGNIFICANT CHANGE UP (ref 3.8–10.5)

## 2017-11-19 PROCEDURE — 99291 CRITICAL CARE FIRST HOUR: CPT

## 2017-11-19 PROCEDURE — 99292 CRITICAL CARE ADDL 30 MIN: CPT

## 2017-11-19 PROCEDURE — 71010: CPT | Mod: 26

## 2017-11-19 RX ORDER — CISATRACURIUM BESYLATE 2 MG/ML
2 INJECTION INTRAVENOUS
Qty: 200 | Refills: 0 | Status: DISCONTINUED | OUTPATIENT
Start: 2017-11-19 | End: 2017-11-20

## 2017-11-19 RX ORDER — PANTOPRAZOLE SODIUM 20 MG/1
40 TABLET, DELAYED RELEASE ORAL DAILY
Qty: 0 | Refills: 0 | Status: DISCONTINUED | OUTPATIENT
Start: 2017-11-19 | End: 2017-11-22

## 2017-11-19 RX ORDER — MIDAZOLAM HYDROCHLORIDE 1 MG/ML
5 INJECTION, SOLUTION INTRAMUSCULAR; INTRAVENOUS
Qty: 100 | Refills: 0 | Status: DISCONTINUED | OUTPATIENT
Start: 2017-11-19 | End: 2017-11-20

## 2017-11-19 RX ORDER — SENNA PLUS 8.6 MG/1
5 TABLET ORAL THREE TIMES A DAY
Qty: 0 | Refills: 0 | Status: DISCONTINUED | OUTPATIENT
Start: 2017-11-19 | End: 2017-11-27

## 2017-11-19 RX ORDER — FUROSEMIDE 40 MG
10 TABLET ORAL ONCE
Qty: 0 | Refills: 0 | Status: COMPLETED | OUTPATIENT
Start: 2017-11-19 | End: 2017-11-19

## 2017-11-19 RX ORDER — POTASSIUM CHLORIDE 20 MEQ
10 PACKET (EA) ORAL
Qty: 0 | Refills: 0 | Status: COMPLETED | OUTPATIENT
Start: 2017-11-19 | End: 2017-11-19

## 2017-11-19 RX ADMIN — Medication 1 MILLIGRAM(S): at 11:04

## 2017-11-19 RX ADMIN — POLYETHYLENE GLYCOL 3350 17 GRAM(S): 17 POWDER, FOR SOLUTION ORAL at 22:53

## 2017-11-19 RX ADMIN — Medication 500 MILLIGRAM(S): at 06:03

## 2017-11-19 RX ADMIN — FENTANYL CITRATE 7.5 MICROGRAM(S)/KG/HR: 50 INJECTION INTRAVENOUS at 22:52

## 2017-11-19 RX ADMIN — BUDESONIDE AND FORMOTEROL FUMARATE DIHYDRATE 2 PUFF(S): 160; 4.5 AEROSOL RESPIRATORY (INHALATION) at 10:04

## 2017-11-19 RX ADMIN — Medication 100 MILLIEQUIVALENT(S): at 09:27

## 2017-11-19 RX ADMIN — VORICONAZOLE 125 MILLIGRAM(S): 10 INJECTION, POWDER, LYOPHILIZED, FOR SOLUTION INTRAVENOUS at 22:52

## 2017-11-19 RX ADMIN — CISATRACURIUM BESYLATE 9 MICROGRAM(S)/KG/MIN: 2 INJECTION INTRAVENOUS at 11:21

## 2017-11-19 RX ADMIN — Medication 1 APPLICATION(S): at 06:04

## 2017-11-19 RX ADMIN — ATORVASTATIN CALCIUM 80 MILLIGRAM(S): 80 TABLET, FILM COATED ORAL at 22:53

## 2017-11-19 RX ADMIN — PANTOPRAZOLE SODIUM 40 MILLIGRAM(S): 20 TABLET, DELAYED RELEASE ORAL at 11:04

## 2017-11-19 RX ADMIN — SENNA PLUS 5 MILLILITER(S): 8.6 TABLET ORAL at 22:52

## 2017-11-19 RX ADMIN — Medication 1 PUFF(S): at 03:55

## 2017-11-19 RX ADMIN — BUDESONIDE AND FORMOTEROL FUMARATE DIHYDRATE 2 PUFF(S): 160; 4.5 AEROSOL RESPIRATORY (INHALATION) at 20:03

## 2017-11-19 RX ADMIN — MIDAZOLAM HYDROCHLORIDE 5 MG/HR: 1 INJECTION, SOLUTION INTRAMUSCULAR; INTRAVENOUS at 22:52

## 2017-11-19 RX ADMIN — Medication 20 MILLIGRAM(S): at 06:18

## 2017-11-19 RX ADMIN — CHLORHEXIDINE GLUCONATE 15 MILLILITER(S): 213 SOLUTION TOPICAL at 06:04

## 2017-11-19 RX ADMIN — SODIUM CHLORIDE 30 MILLILITER(S): 9 INJECTION INTRAMUSCULAR; INTRAVENOUS; SUBCUTANEOUS at 07:39

## 2017-11-19 RX ADMIN — CHLORHEXIDINE GLUCONATE 15 MILLILITER(S): 213 SOLUTION TOPICAL at 17:46

## 2017-11-19 RX ADMIN — VORICONAZOLE 125 MILLIGRAM(S): 10 INJECTION, POWDER, LYOPHILIZED, FOR SOLUTION INTRAVENOUS at 10:58

## 2017-11-19 RX ADMIN — VORICONAZOLE 125 MILLIGRAM(S): 10 INJECTION, POWDER, LYOPHILIZED, FOR SOLUTION INTRAVENOUS at 03:23

## 2017-11-19 RX ADMIN — Medication 500 MILLIGRAM(S): at 17:47

## 2017-11-19 RX ADMIN — Medication 1 PUFF(S): at 10:03

## 2017-11-19 RX ADMIN — Medication 325 MILLIGRAM(S): at 11:04

## 2017-11-19 RX ADMIN — Medication 325 MILLIGRAM(S): at 17:47

## 2017-11-19 RX ADMIN — Medication 20 MILLIGRAM(S): at 18:09

## 2017-11-19 RX ADMIN — Medication 10 MILLIGRAM(S): at 07:35

## 2017-11-19 RX ADMIN — CISATRACURIUM BESYLATE 9 MICROGRAM(S)/KG/MIN: 2 INJECTION INTRAVENOUS at 07:38

## 2017-11-19 RX ADMIN — ALBUTEROL 2 PUFF(S): 90 AEROSOL, METERED ORAL at 10:02

## 2017-11-19 RX ADMIN — Medication 325 MILLIGRAM(S): at 07:40

## 2017-11-19 RX ADMIN — FENTANYL CITRATE 7.5 MICROGRAM(S)/KG/HR: 50 INJECTION INTRAVENOUS at 07:38

## 2017-11-19 RX ADMIN — Medication 1 PUFF(S): at 22:18

## 2017-11-19 RX ADMIN — Medication 1 PUFF(S): at 15:42

## 2017-11-19 RX ADMIN — ALBUTEROL 2 PUFF(S): 90 AEROSOL, METERED ORAL at 15:41

## 2017-11-19 RX ADMIN — CHLORHEXIDINE GLUCONATE 1 APPLICATION(S): 213 SOLUTION TOPICAL at 08:38

## 2017-11-19 RX ADMIN — MIDAZOLAM HYDROCHLORIDE 5 MG/HR: 1 INJECTION, SOLUTION INTRAMUSCULAR; INTRAVENOUS at 07:37

## 2017-11-19 RX ADMIN — FENTANYL CITRATE 1 MICROGRAM(S)/KG/HR: 50 INJECTION INTRAVENOUS at 07:40

## 2017-11-19 RX ADMIN — Medication 1 APPLICATION(S): at 17:46

## 2017-11-19 RX ADMIN — Medication 100 MILLIEQUIVALENT(S): at 10:19

## 2017-11-19 NOTE — PROGRESS NOTE ADULT - PROBLEM SELECTOR PLAN 4
on prednisone.  11/10 continue steroid  11/11 on steroid  11/12 on oral steroid  11/13: cont oral steroids!!  11/14: Has pretty poor lungs secondary to steroids: Cont current therapy!!  11/16: on high dose steroids for now , would need to taper down the steroids to 10 or 20 mg of prednisone chronically:  11/17 steroid on prednisone.  11/10 continue steroid  11/11 on steroid  11/12 on oral steroid  11/13: cont oral steroids!!  11/14: Has pretty poor lungs secondary to steroids: Cont current therapy!!  11/16: on high dose steroids for now , would need to taper down the steroids to 10 or 20 mg of prednisone chronically:  11/17 steroid  11/18 on Steroid

## 2017-11-19 NOTE — PROGRESS NOTE ADULT - PROBLEM SELECTOR PLAN 3
Cont BD with inhaled steroids and oral steroids  11/114: Pts wheezing has significantly improved: he will need long term steroids as he starts wheezing once his steroids are dced!  11/15: Wheezing has increased: started on IV steroids high dose by thoracic team: Would suggest to decrease to 20 mg three times a day !  11/16: decrease steroids today to 20 mg three times day  11/17: try to decrease steroids in AM  11/18 Duoneb. intubated for acute respiratory distress with hypoxia Cont BD with inhaled steroids and oral steroids  11/114: Pts wheezing has significantly improved: he will need long term steroids as he starts wheezing once his steroids are dced!  11/15: Wheezing has increased: started on IV steroids high dose by thoracic team: Would suggest to decrease to 20 mg three times a day !  11/16: decrease steroids today to 20 mg three times day  11/17: try to decrease steroids in AM  11/18 Duoneb. intubated for acute respiratory distress with hypoxia  11/19 KIARA. intubated and on ventilator support

## 2017-11-19 NOTE — PROGRESS NOTE ADULT - PROBLEM SELECTOR PLAN 1
intubated: try to titrate as tolerated!  11/18 on ventilator support. Pt is sedated and paralyzed. defer ventilator Management/ weaning per CTICU team. intubated: try to titrate as tolerated!  11/18 on ventilator support. Pt is sedated and paralyzed. defer ventilator Management/ weaning per CTICU team.  11/19 still sedated and paralyzed. Plan to wean off Nimbex today per Primary RN. ABGs reviewed.

## 2017-11-19 NOTE — PROGRESS NOTE ADULT - SUBJECTIVE AND OBJECTIVE BOX
47 M PMH of sarcoidosis, HTN, COPD, and asthma who was initially admitted on 09/10 w cc of severe leg spasms X two days  On 9/12 at 4 am, a RRT and Stroke codes were called as the patient became unresponsive.  He was transferred to MICU and then intubated for airway protection.   The patient was found to have a new infarct on MRI and also found to have a patent PFO on this admission as well. He was weaned off vent on 09/13 and transferred to the floors   There was concern patient was having paroxysmal afib, so he was started on Eliquis and received 2 doses of Eliquis on 09/19.  However, on 09/20, the patient had a cough productive of  fresh blood, about 1/2 cup of blood. He underwent Bronchoscopy with bronchoalveolar lavage of both sides and epi injection on 09/21  However, he developed hemoptysis again and underwent urgent LULectomy, complicated with post op bleeding.  [id number]:  Intubated fro airway protection  [id number]:  Extubated  [id number]:  FOB 2/2 large hemoptysis  [id number]:   Left VATS and DAVID lobectomy 9/24/17   [id number]:   Re-op L thoracotomy, evacuation of hemothorax (600cc clot, 400cc blood) no obvious source of bleeding      Re-op L thoracotomy, evacuation of hemothorax; RTOR x2 100cc clot; 200cc blood: bleeding posterior intercostal arteries identified and cauterized 9/25/17   [id number]:   L Pigtail catheter (IR) for L apical PTX.  (Transferred back to ICU 2/2 increased SOB)  [id number]   Bedside talc pleurodesis    Pt eventually discharged to rehab - readmitted on [id number]    [id number]:   Bronchoscopy with bronchoalveolar lavage  in OR- Remained intubated  [id number]:   Repeat bronchoscopy in ICU for persistent hemoptysis via ETT; bronchial block placement into left main bronchus  [id number]:   IR left bronchial artery embolization, Bronchoscopy with BAL post IR embolization - w/o evidence of active bleeding  [id number]:   Repeat bronchoscopy in ICU  via ETT; No evidence of active bleeding. Old clots    Issues:                 acute resp failure on vent - with hypoxemia              hemoptysis              Aspergilloma on Voriconazole              Sarcoidosis               PFO               Anemia  CVA  with L-side weakness              S/p post op bleeding              S/P Pulm edema, possible TRALI              s/p massive transfusion     Interval/Overnight Events/ ROS  Hemodynamically stable, not on any pressors or inotropes Intubated, sedated and paralyzed.       Home Medications:   * Incomplete Medication History as of 11-Sep-2017 09:23 documented in Structured Notes  · 	Azithromycin 5 Day Dose Pack 250 mg oral tablet: 1 dose(s) orally once a day for respiratory infection, Last Dose Taken:    · 	predniSONE 50 mg oral tablet: 1 tab(s) orally once a day , Last Dose Taken:    · 	albuterol 1.25 mg/3 mL (0.042%) inhalation solution: 3 milliliter(s) inhaled 3 times a day, Last Dose Taken:    · 	losartan 25 mg oral tablet: 1 tab(s) orally 2 times a day, Last Dose Taken:    · 	Symbicort 160 mcg-4.5 mcg/inh inhalation aerosol: 2 puff(s) inhaled 2 times a day, Last Dose Taken:          MEDICATIONS  (STANDING):  atorvastatin 80 milliGRAM(s) Oral at bedtime  buDESOnide 160 MICROgram(s)/formoterol 4.5 MICROgram(s) Inhaler 2 Puff(s) Inhalation two times a day  chlorhexidine 0.12% Liquid 15 milliLiter(s) Swish and Spit two times a day  chlorhexidine 4% Liquid 1 Application(s) Topical daily  cisatracurium Infusion 2 MICROgram(s)/kG/Min (9 mL/Hr) IV Continuous <Continuous>  dexmedetomidine Infusion 0.3 MICROgram(s)/kG/Hr (5.625 mL/Hr) IV Continuous <Continuous>  dextrose 5%. 1000 milliLiter(s) (50 mL/Hr) IV Continuous <Continuous>  dextrose 50% Injectable 12.5 Gram(s) IV Push once  dextrose 50% Injectable 25 Gram(s) IV Push once  dextrose 50% Injectable 25 Gram(s) IV Push once  docusate sodium 100 milliGRAM(s) Oral two times a day  fentaNYL   Infusion 1 MICROgram(s)/kG/Hr (7.5 mL/Hr) IV Continuous <Continuous>  ferrous    sulfate 325 milliGRAM(s) Oral three times a day with meals  folic acid 1 milliGRAM(s) Oral daily  insulin lispro (HumaLOG) corrective regimen sliding scale   SubCutaneous every 6 hours  ipratropium 17 MICROgram(s) HFA Inhaler 1 Puff(s) Inhalation every 6 hours  methylPREDNISolone sodium succinate Injectable 20 milliGRAM(s) IV Push every 12 hours  midazolam Infusion 5 mG/Hr (5 mL/Hr) IV Continuous <Continuous>  pantoprazole  Injectable 40 milliGRAM(s) IV Push daily  petrolatum Ophthalmic Ointment 1 Application(s) Both EYES two times a day  phenylephrine    Infusion 0.1 MICROgram(s)/kG/Min (2.813 mL/Hr) IV Continuous <Continuous>  polyethylene glycol 3350 17 Gram(s) Oral at bedtime  sodium chloride 0.9%. 1000 milliLiter(s) (30 mL/Hr) IV Continuous <Continuous>  valproic  acid Syrup 500 milliGRAM(s) Oral two times a day  voriconazole IVPB 300 milliGRAM(s) IV Intermittent every 12 hours    MEDICATIONS  (PRN):  acetaminophen  IVPB. 1000 milliGRAM(s) IV Intermittent once PRN Moderate Pain (4 - 6)  ALBUTerol    90 MICROgram(s) HFA Inhaler 2 Puff(s) Inhalation every 6 hours PRN Shortness of Breath and/or Wheezing  dextrose Gel 1 Dose(s) Oral once PRN Blood Glucose LESS THAN 70 milliGRAM(s)/deciliter  glucagon  Injectable 1 milliGRAM(s) IntraMuscular once PRN Glucose LESS THAN 70 milligrams/deciliter      ICU Vital Signs Last 24 Hrs  T(C): 35.6 (19 Nov 2017 08:00), Max: 36.7 (18 Nov 2017 12:00)  T(F): 96 (19 Nov 2017 08:00), Max: 98.1 (18 Nov 2017 12:00)  HR: 90 (19 Nov 2017 11:40) (69 - 116)  BP: 97/69 (19 Nov 2017 08:00) (95/65 - 131/89)  BP(mean): 75 (19 Nov 2017 08:00) (72 - 101)  ABP: 116/66 (19 Nov 2017 11:00) (100/71 - 160/93)  ABP(mean): 82 (19 Nov 2017 11:00) (71 - 118)  RR: 24 (19 Nov 2017 11:00) (8 - 24)  SpO2: 100% (19 Nov 2017 11:40) (96% - 100%)      Physical exam:                                                   Neuro:      Alert & nonfocal    /  Confused   /  Sedated                          Cardiovascular:  S1 & S2, regular / irregular                          Respiratory:   Air entry is fair and equal on both sides, has bilateral conducted sounds / rhonchi /rales                          GI:  Soft, nondistended and nontender, Bowel sounds active        Distended / tender                          Ext: No cyanosis or edema,   bilateral pedal edema    I&O's Summary    18 Nov 2017 07:01  -  19 Nov 2017 07:00  --------------------------------------------------------  IN: 3372.5 mL / OUT: 1725 mL / NET: 1647.5 mL    19 Nov 2017 07:01  -  19 Nov 2017 11:46  --------------------------------------------------------  IN: 1002 mL / OUT: 515 mL / NET: 487 mL        Labs:                                                                           7.8    8.06  )-----------( 61       ( 19 Nov 2017 07:20 )             24.7                            11-19    135  |  95<L>  |  16  ----------------------------<  177<H>  3.9   |  30  |  0.62    Ca    8.3<L>      19 Nov 2017 04:45  Phos  4.0     11-18  Mg     1.7     11-18    TPro  5.1<L>  /  Alb  2.6<L>  /  TBili  0.2  /  DBili  x   /  AST  14  /  ALT  11  /  AlkPhos  52  11-19    CAPILLARY BLOOD GLUCOSE      POCT Blood Glucose.: 142 mg/dL (19 Nov 2017 11:22)    LIVER FUNCTIONS - ( 19 Nov 2017 04:45 )  Alb: 2.6 g/dL / Pro: 5.1 g/dL / ALK PHOS: 52 u/L / ALT: 11 u/L / AST: 14 u/L / GGT: x                                PT/INR - ( 18 Nov 2017 03:40 )   PT: 13.0 SEC;   INR: 1.16          PTT - ( 18 Nov 2017 03:40 )  PTT:25.9 SEC    Mode: AC/ CMV (Assist Control/ Continuous Mandatory Ventilation)  RR (machine): 24  TV (machine): 350  FiO2: 40  PEEP: 5  MAP: 13  PIP: 32        ABG - ( 19 Nov 2017 04:45 )  pH: 7.48  /  pCO2: 43    /  pO2: 184   / HCO3: 31    / Base Excess: 7.5   /  SaO2: 99.7        Plan:  47 M PMH of sarcoidosis, HTN, COPD, and asthma who was initially admitted on 09/10 w cc of severe leg spasms X two days  On 9/12 at 4 am, a RRT and Stroke codes were called as the patient became unresponsive.  He was transferred to MICU and then intubated for airway protection.   The patient was found to have a new infarct on MRI and also found to have a patent PFO on this admission as well. He was weaned off vent on 09/13 and transferred to the floors   There was concern patient was having paroxysmal afib, so he was started on Eliquis and received 2 doses of Eliquis on 09/19.  However, on 09/20, the patient had a cough productive of  fresh blood, about 1/2 cup of blood. He underwent Bronchoscopy with bronchoalveolar lavage of both sides and epi injection on 09/21  However, he developed hemoptysis again and underwent urgent LULectomy, complicated with post op bleeding.  [id number]:  Intubated fro airway protection  [id number]:  Extubated  [id number]:  FOB 2/2 large hemoptysis  [id number]:   Left VATS and DAVID lobectomy 9/24/17   [id number]:   Re-op L thoracotomy, evacuation of hemothorax (600cc clot, 400cc blood) no obvious source of bleeding      Re-op L thoracotomy, evacuation of hemothorax; RTOR x2 100cc clot; 200cc blood: bleeding posterior intercostal arteries identified and cauterized 9/25/17   [id number]:   L Pigtail catheter (IR) for L apical PTX.  (Transferred back to ICU 2/2 increased SOB)  [id number]   Bedside talc pleurodesis    Pt eventually discharged to rehab - readmitted on [id number]    [id number]:   Bronchoscopy with bronchoalveolar lavage  in OR- Remained intubated  [id number]:   Repeat bronchoscopy in ICU for persistent hemoptysis via ETT; bronchial block placement into left main bronchus  [id number]:   IR left bronchial artery embolization, Bronchoscopy with BAL post IR embolization - w/o evidence of active bleeding  [id number]:   Repeat bronchoscopy in ICU  via ETT; No evidence of active bleeding. 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                            Respiratory:                                         Pt is on full vent support.                                           Keep pt sedated and paralyzed as airway pressures are high and difficult to ventilate.                                                                Continue bronchodilators, pulmonary toilet     Dr. Caldera to discuss with pulmonary regarding possible candidate for lung transplant.     Sarcoidosis: Continue Solumedrol 20mg Q 8hrs     Has big space at the left apex - discussed with Dr. Alvarado, no intervention at this time                            GI                                         Start OGT feeds and increase as tolerated                                         Continue GI prophylaxis with Protonix                                         Continue Zofran / Reglan for nausea - PRN	                                                                 Renal:                                         Continue LR 30cc/hr                                         Monitor I/Os and electrolytes                                         [place]                                                  Hem/ Onc:                                          Monitor chest tube output &  signs of bleeding.                                          Follow CBC      SQ Heparin and Venodynes for DVT prophylaxis.                            Infectious disease:                                          Aspergilloma - On voriconazole                                           All surgical incision / chest tube  sites look clean                            Endocrine                                           Continue Accu-Checks with coverage    All available pertinent clinical, laboratory, radiographic, hemodynamic, echocardiographic, respiratory data, microbiologic data and chart were reviewed and analyzed frequently throughout the course of the day and night.  Patient seen, examined and plan discussed with CT Surgeon / CTICU team during rounds.    I have spent 90  minutes of critical care time with this pt between  8 am and 7 pm    Prosper Wen MD

## 2017-11-19 NOTE — PROGRESS NOTE ADULT - PROBLEM SELECTOR PLAN 6
s/p pleurodesis.  11/10 s/p pleurodesis. Management defer to CTS  11/11 no intervention for now. per CTS.  11/13: has mod pneumothorax stable: defer to thoracic for any intervention!  11/12 no intervention planned. bronchoscopy in future per CTS  11/14: Pt still has left sided pneumothorax: s/p blood patch as well as the talc pleurodesis on the left side: Hard to do any other intervention : would defer to thoracic surgical team!!  11/18 management per CTS. not plan for Chest tube at this moment. s/p pleurodesis.  11/10 s/p pleurodesis. Management defer to CTS  11/11 no intervention for now. per CTS.  11/13: has mod pneumothorax stable: defer to thoracic for any intervention!  11/12 no intervention planned. bronchoscopy in future per CTS  11/14: Pt still has left sided pneumothorax: s/p blood patch as well as the talc pleurodesis on the left side: Hard to do any other intervention : would defer to thoracic surgical team!!  11/18 management per CTS. not plan for Chest tube at this moment.  11/19 stable radiographically and clinically. Management defer to CTS

## 2017-11-19 NOTE — PROGRESS NOTE ADULT - ATTENDING COMMENTS
pt doing poorly  paralyzed, sedated but not requiring high FIO2 :   no bleeding noted:  try to wean sedation as well as paralyzing agent   Steroids have been decreased: goal would be rapidly tapered down steroids to minimum possible given h e is on sedation and paralyzing agent: But per reports pt gets very agitated when taken of paralyzing agent!

## 2017-11-19 NOTE — PROGRESS NOTE ADULT - PROBLEM SELECTOR PLAN 5
controlled.  11/10 stable  11/11 stable  11/12 stable  11/18 off meds. not on pressor controlled.  11/10 stable  11/11 stable  11/12 stable  11/18 off meds. not on pressor  11/19 stable without pressors

## 2017-11-19 NOTE — PROGRESS NOTE ADULT - SUBJECTIVE AND OBJECTIVE BOX
Patient is a 47y old  Male who presents with a chief complaint of hemoptysis (09 Nov 2017 00:32)  Pertinent ROS:     MEDICATIONS  (STANDING):  atorvastatin 80 milliGRAM(s) Oral at bedtime  buDESOnide 160 MICROgram(s)/formoterol 4.5 MICROgram(s) Inhaler 2 Puff(s) Inhalation two times a day  chlorhexidine 0.12% Liquid 15 milliLiter(s) Swish and Spit two times a day  chlorhexidine 4% Liquid 1 Application(s) Topical daily  cisatracurium Infusion 2 MICROgram(s)/kG/Min (9 mL/Hr) IV Continuous <Continuous>  dexmedetomidine Infusion 0.3 MICROgram(s)/kG/Hr (5.625 mL/Hr) IV Continuous <Continuous>  dextrose 5%. 1000 milliLiter(s) (50 mL/Hr) IV Continuous <Continuous>  dextrose 50% Injectable 12.5 Gram(s) IV Push once  dextrose 50% Injectable 25 Gram(s) IV Push once  dextrose 50% Injectable 25 Gram(s) IV Push once  docusate sodium 100 milliGRAM(s) Oral two times a day  fentaNYL   Infusion 1 MICROgram(s)/kG/Hr (7.5 mL/Hr) IV Continuous <Continuous>  ferrous    sulfate 325 milliGRAM(s) Oral three times a day with meals  folic acid 1 milliGRAM(s) Oral daily  insulin lispro (HumaLOG) corrective regimen sliding scale   SubCutaneous every 6 hours  ipratropium 17 MICROgram(s) HFA Inhaler 1 Puff(s) Inhalation every 6 hours  methylPREDNISolone sodium succinate Injectable 20 milliGRAM(s) IV Push every 12 hours  midazolam Infusion 5 mG/Hr (5 mL/Hr) IV Continuous <Continuous>  pantoprazole  Injectable 40 milliGRAM(s) IV Push daily  petrolatum Ophthalmic Ointment 1 Application(s) Both EYES two times a day  phenylephrine    Infusion 0.1 MICROgram(s)/kG/Min (2.813 mL/Hr) IV Continuous <Continuous>  polyethylene glycol 3350 17 Gram(s) Oral at bedtime  sodium chloride 0.9%. 1000 milliLiter(s) (30 mL/Hr) IV Continuous <Continuous>  valproic  acid Syrup 500 milliGRAM(s) Oral two times a day  voriconazole IVPB 300 milliGRAM(s) IV Intermittent every 12 hours    MEDICATIONS  (PRN):  acetaminophen  IVPB. 1000 milliGRAM(s) IV Intermittent once PRN Moderate Pain (4 - 6)  ALBUTerol    90 MICROgram(s) HFA Inhaler 2 Puff(s) Inhalation every 6 hours PRN Shortness of Breath and/or Wheezing  dextrose Gel 1 Dose(s) Oral once PRN Blood Glucose LESS THAN 70 milliGRAM(s)/deciliter  glucagon  Injectable 1 milliGRAM(s) IntraMuscular once PRN Glucose LESS THAN 70 milligrams/deciliter    Vital Signs Last 24 Hrs  T(C): 35.6 (19 Nov 2017 08:00), Max: 36.7 (18 Nov 2017 12:00)  T(F): 96 (19 Nov 2017 08:00), Max: 98.1 (18 Nov 2017 12:00)  HR: 77 (19 Nov 2017 11:00) (69 - 116)  BP: 97/69 (19 Nov 2017 08:00) (95/65 - 131/89)  BP(mean): 75 (19 Nov 2017 08:00) (72 - 101)  RR: 24 (19 Nov 2017 11:00) (8 - 24)  SpO2: 100% (19 Nov 2017 11:00) (96% - 100%)  Mode: AC/ CMV (Assist Control/ Continuous Mandatory Ventilation)  RR (machine): 24  TV (machine): 350  FiO2: 40  PEEP: 5  MAP: 17  PIP: 43    I&O's Summary    18 Nov 2017 07:01  -  19 Nov 2017 07:00  --------------------------------------------------------  IN: 3372.5 mL / OUT: 1725 mL / NET: 1647.5 mL    19 Nov 2017 07:01  -  19 Nov 2017 11:38  --------------------------------------------------------  IN: 1002 mL / OUT: 515 mL / NET: 487 mL        Physical Exam:   Const:  Respiratory:   CVS:  GI:  CNS  SKIN  :     Labs:  ABG - ( 19 Nov 2017 04:45 )  pH: 7.48  /  pCO2: 43    /  pO2: 184   / HCO3: 31    / Base Excess: 7.5   /  SaO2: 99.7                              7.8    8.06  )-----------( 61       ( 19 Nov 2017 07:20 )             24.7     11-19    135  |  95<L>  |  16  ----------------------------<  177<H>  3.9   |  30  |  0.62    Ca    8.3<L>      19 Nov 2017 04:45  Phos  4.0     11-18  Mg     1.7     11-18    TPro  5.1<L>  /  Alb  2.6<L>  /  TBili  0.2  /  DBili  x   /  AST  14  /  ALT  11  /  AlkPhos  52  11-19    CAPILLARY BLOOD GLUCOSE      POCT Blood Glucose.: 142 mg/dL (19 Nov 2017 11:22)  POCT Blood Glucose.: 150 mg/dL (19 Nov 2017 06:16)  POCT Blood Glucose.: 150 mg/dL (19 Nov 2017 01:04)  POCT Blood Glucose.: 159 mg/dL (18 Nov 2017 17:38)    LIVER FUNCTIONS - ( 19 Nov 2017 04:45 )  Alb: 2.6 g/dL / Pro: 5.1 g/dL / ALK PHOS: 52 u/L / ALT: 11 u/L / AST: 14 u/L / GGT: x           PT/INR - ( 18 Nov 2017 03:40 )   PT: 13.0 SEC;   INR: 1.16          PTT - ( 18 Nov 2017 03:40 )  PTT:25.9 SEC    D DImer  Cultures:           Wound culture:                11-15 @ 16:22  Organism --  Culture w/ gram stain --  Specimen Source BRONCHIAL LAVAGE    Wound culture:                11-15 @ 09:22  Organism --  Culture w/ gram stain --  Specimen Source BLOOD    Wound culture:                11-14 @ 00:44  Organism --  Culture w/ gram stain --  Specimen Source SPUTUM    Wound culture:                11-13 @ 13:26  Organism --  Culture w/ gram stain --  Specimen Source URINE MIDSTREAM    Wound culture:                11-13 @ 13:20  Organism --  Culture w/ gram stain --  Specimen Source BLOOD PERIPHERAL      Abscess culture:             11-15 @ 16:22  Organism --  Gram Stain --  Specimen Source BRONCHIAL LAVAGE    Abscess culture:             11-15 @ 09:22  Organism --  Gram Stain --  Specimen Source BLOOD    Abscess culture:             11-14 @ 00:44  Organism --  Gram Stain --  Specimen Source SPUTUM    Abscess culture:             11-13 @ 13:26  Organism --  Gram Stain --  Specimen Source URINE MIDSTREAM    Abscess culture:             11-13 @ 13:20  Organism --  Gram Stain --  Specimen Source BLOOD PERIPHERAL        Tissue culture:           11-15 @ 16:22  Organism --  Gram Stain --  Specimen Source BRONCHIAL LAVAGE    Tissue culture:           11-15 @ 09:22  Organism --  Gram Stain --  Specimen Source BLOOD    Tissue culture:           11-14 @ 00:44  Organism --  Gram Stain --  Specimen Source SPUTUM    Tissue culture:           11-13 @ 13:26  Organism --  Gram Stain --  Specimen Source URINE MIDSTREAM    Tissue culture:           11-13 @ 13:20  Organism --  Gram Stain --  Specimen Source BLOOD PERIPHERAL      Body Fluid Smear & Culture:                        11-15 @ 16:22  AFB Smear  --  Culture Acid Fast Body Fluid w/ Smear  --  Culture Acid Fast Smear Concentrated     AFB SMEAR= NO ACID FAST BACILLI SEEN    Culture Results:     --  Specimen Source BRONCHIAL LAVAGE    Body Fluid Smear & Culture:                        11-15 @ 09:22  AFB Smear  --  Culture Acid Fast Body Fluid w/ Smear  --  Culture Acid Fast Smear Concentrated   --    Culture Results:     --  Specimen Source BLOOD    Body Fluid Smear & Culture:                        11-14 @ 00:44  AFB Smear  --  Culture Acid Fast Body Fluid w/ Smear  --  Culture Acid Fast Smear Concentrated   --    Culture Results:     --  Specimen Source SPUTUM    Body Fluid Smear & Culture:                        11-13 @ 13:26  AFB Smear  --  Culture Acid Fast Body Fluid w/ Smear  --  Culture Acid Fast Smear Concentrated   --    Culture Results:     --  Specimen Source URINE MIDSTREAM    Body Fluid Smear & Culture:                        11-13 @ 13:20  AFB Smear  --  Culture Acid Fast Body Fluid w/ Smear  --  Culture Acid Fast Smear Concentrated   --    Culture Results:     --  Specimen Source BLOOD PERIPHERAL        Rapid Respiratory Viral Panel Result        11-13 @ 15:46  Rapid RVP --  Coronovirus --  Adenovirus NOT DETECTED  Bordetella Pertussis NOT DETECTED  Chlamydia Pneumonia NOT DETECTED  Entero/RhinovirusNOT DETECTED  HKU1 Coronovirus --  HMPV Coronovirus NOT DETECTED  Influenza A NOT DETECTED (any subtype)  Influenza AH1 NOT DETECTED  Influenza AH1 2009 NOT DETECTED  Influenza AH3 NOT DETECTED  Influenza B NOT DETECTED  Mycoplasma Pneumoniae NOT DETECTED This nucleic acid amplification assay was performed using  the [employer] FilmArray. The following pathogens are tested  for: Adenovirus, Coronavirus 229E, Coronavirus HKU1,  Coronavirus NL63, Coronavirus OC43, Human Metapneumovirus  (HMPV), Rhinovirus/Enterovirus, Influenza A H1, Influenza A  H1 2009 (Pandemic H1 2009), Influenza A H3, Influenza A (Flu  A) subtype not identified, Influenza B, Parainfluenza Virus  (types 1, 2, 3, 4), Respiratory Syncytial Virus (RSV),  Bordetella pertussis, Chlamydophila pneumoniae, and  Mycoplasma pneumoniae. A negative FilmArray result does not  always exclude the possibility of viral or bacterial  infection. Laboratory results should always be interpreted  in the context of clinical findings.  NL63 Coronovirus --  OC43 Coronovirus --  Parainfluenza 1 NOT DETECTED  Parainfluenza 2 NOT DETECTED  Parainfluenza 3 NOT DETECTED  Parainfluenza 4 NOT DETECTED  Resp Syncytial Virus NOT DETECTED        Studies  Chest X-RAY < from: Xray Chest 1 View AP- PORTABLE-Urgent (11.18.17 @ 08:59) >    EXAM:  RAD CHEST PORTABLE URGENT        PROCEDURE DATE:  Nov 18 2017         INTERPRETATION:  EXAMINATION: RAD CHEST PORTABLE URGENT    CLINICAL INDICATION: PTX    TECHNIQUE: Frontal radiograph of the chest was obtained.    COMPARISON: 11/18/1979.    FINDINGS:     Cardiac silhouette normal in size. Endotracheal tube tip overlies the   thoracic inlet. Unchanged chronic sarcoidosis related changes with   left-sided surgical sutures and moderate left pneumothorax.    IMPRESSION:   No change from prior study.    < end of copied text >    < end of copied text >   < from: CT Angio Chest w/ IV Cont (11.10.17 @ 11:10) >    EXAM:  CT ANGIO CHEST (W)AW IC        PROCEDURE DATE:  Nov 10 2017         INTERPRETATION:  CLINICAL INFORMATION: Hemoptysis, history of sarcoid and   aspergilloma, status post left upper lobectomy    COMPARISON: CT chest 11/3/2017    PROCEDURE:   CT Angiography of the Chest.  90 ml of Omnipaque 350 was injected intravenously. 10 ml were discarded.  Sagittal and coronal reformats were performed as well as MIPS.    FINDINGS:    CHEST:     PULMONARY ARTERY: Suboptimal study of the pulmonary artery and its distal   branches. No definite pulmonary embolism. The pulmonary artery is   enlarged, measuring 3.7 cm. No active pulmonary hemorrhage.  LUNGS AND LARGE AIRWAYS: Status post left upper lobe lobectomy. Unchanged   patchy peribronchial opacities, predominantly in the right upper lobe.   Unchanged cystic changes of the lungs bilaterally.  PLEURA: Unchanged left pneumothorax. Small left pleural effusion of mixed   density.  VESSELS: Normal left-sided aortic arch and descending aorta. No aneurysm.   No atherosclerotic changes.  HEART: Heart size is normal. Trace pericardial effusion.   MEDIASTINUM AND ERIC: No lymphadenopathy.  CHEST WALL AND LOWER NECK: Within normal limits.  VISUALIZED UPPER ABDOMEN: Bilateral nephrolithiasis without   hydronephrosis.  BONES: Unchanged left seventh rib thoracotomy defect. Mild degenerative   changes of the spine.    IMPRESSION: No active pulmonary hemorrhage.    Unchanged left pneumothorax.    Unchanged patchy peribronchial opacities and cystic changes, possibly   related to patient's known sarcoidosis.    Unchanged small left pleural effusion mixed density, possible element of   proteinaceous or hemorrhagic fluid.    < end of copied text >    CT Abdomen   Venous Dopplers: LE: < from: US Duplex Venous Lower Ext Complete, Bilateral (10.24.17 @ 11:42) >    EXAM:  US DPLX LWR EXT VEINS COMPL BI        PROCEDURE DATE:  Oct 24 2017         INTERPRETATION:  CLINICAL INFORMATION: Lower extremity swelling.    COMPARISON: None available.    TECHNIQUE: Duplex sonography of the BILATERAL LOWER extremities with   color and spectral Doppler, with and without compression.      FINDINGS:    There is normal compressibility of the bilateral common femoral, femoral   and popliteal veins. No calf vein thrombosis is detected.    Doppler examination shows normal spontaneous and phasic flow.    IMPRESSION:     No evidence of bilateral lower extremity deep venous thrombosis.    < end of copied text >    Others    < from: Transthoracic Echocardiogram (11.16.17 @ 13:25) >    Patient name: Miguel Dickens  YOB: 1970   Age: 47 (M)   MR#: [id number]  Study Date: 11/16/2017  [facility]  Study quality: Technically Difficult  Referring Physician: Rodrigue Caldera MD  Blood Pressure: 87/69 mmHg  Height: 180 cm  Weight: 97 kg  BSA: 2.2 m2  ------------------------------------------------------------------------  PROCEDURE: Transthoracic echocardiogram with 2-D, M-Mode  and complete spectral and color flow Doppler.  INDICATION: Chronic diastolic (congestive) heart failure  (I50.32)  ------------------------------------------------------------------------  DIMENSIONS:  Dimensions:     Normal Values:  LA:     3.6 cm    2.0 - 4.0 cm  Ao:     2.7 cm    2.0 - 3.8 cm  SEPTUM:   ---    0.6 - 1.2 cm  PWT:      ---     0.6 - 1.1 cm  LVIDd:    ---     3.0 - 5.6 cm  LVIDs:    ---     1.8 - 4.0 cm  ------------------------------------------------------------------------  OBSERVATIONS:  Mitral Valve: Normal mitral valve.  Aortic Root: Normal aortic root.  Aortic Valve: Aortic valve not well visualized; probably  normal.  Left Atrium: Normal left atrium.  Left Ventricle: Technically very difficult/limited left  ventricle views. Grossly  at least moderate global left  ventricular systolic dysfunction.  Right Heart: Normal right atrium. The right ventricle is  not well visualized appears grossly enlarged with decreased  function.  Normal tricuspid valve. Mild tricuspid  regurgitation. Normal pulmonic valve.  Pericardium/PleuraNormal pericardium with trace pericardial  effusion.  Hemodynamic: Estimated right ventricular systolic pressure  equals 50 mm Hg, assuming right atrial pressure equals 10  mm Hg, consistent with moderate pulmonary hypertension.  ------------------------------------------------------------------------  CONCLUSIONS:  1. Technically very difficult/limited left ventricle views.  Grossly  at least moderate global left ventricular systolic  dysfunction.  2. The right ventricle is not well visualized appears  grossly enlarged with decreased function.  3. Estimated pulmonary artery systolic pressure equals 50  mm Hg, assuming right atrial pressure equals 10  mm Hg,  consistent with moderate pulmonary hypertension.  *** Compared with echocardiogram of 9/26/2017, LV function  has decreased.    < end of copied text > Patient is a 47y old  Male who presents with a chief complaint of hemoptysis (09 Nov 2017 00:32)  Pertinent ROS: still sedated and paralysed. not on pressors. on ventilator support.     MEDICATIONS  (STANDING):  atorvastatin 80 milliGRAM(s) Oral at bedtime  buDESOnide 160 MICROgram(s)/formoterol 4.5 MICROgram(s) Inhaler 2 Puff(s) Inhalation two times a day  chlorhexidine 0.12% Liquid 15 milliLiter(s) Swish and Spit two times a day  chlorhexidine 4% Liquid 1 Application(s) Topical daily  cisatracurium Infusion 2 MICROgram(s)/kG/Min (9 mL/Hr) IV Continuous <Continuous>  dexmedetomidine Infusion 0.3 MICROgram(s)/kG/Hr (5.625 mL/Hr) IV Continuous <Continuous>  dextrose 5%. 1000 milliLiter(s) (50 mL/Hr) IV Continuous <Continuous>  dextrose 50% Injectable 12.5 Gram(s) IV Push once  dextrose 50% Injectable 25 Gram(s) IV Push once  dextrose 50% Injectable 25 Gram(s) IV Push once  docusate sodium 100 milliGRAM(s) Oral two times a day  fentaNYL   Infusion 1 MICROgram(s)/kG/Hr (7.5 mL/Hr) IV Continuous <Continuous>  ferrous    sulfate 325 milliGRAM(s) Oral three times a day with meals  folic acid 1 milliGRAM(s) Oral daily  insulin lispro (HumaLOG) corrective regimen sliding scale   SubCutaneous every 6 hours  ipratropium 17 MICROgram(s) HFA Inhaler 1 Puff(s) Inhalation every 6 hours  methylPREDNISolone sodium succinate Injectable 20 milliGRAM(s) IV Push every 12 hours  midazolam Infusion 5 mG/Hr (5 mL/Hr) IV Continuous <Continuous>  pantoprazole  Injectable 40 milliGRAM(s) IV Push daily  petrolatum Ophthalmic Ointment 1 Application(s) Both EYES two times a day  phenylephrine    Infusion 0.1 MICROgram(s)/kG/Min (2.813 mL/Hr) IV Continuous <Continuous>  polyethylene glycol 3350 17 Gram(s) Oral at bedtime  sodium chloride 0.9%. 1000 milliLiter(s) (30 mL/Hr) IV Continuous <Continuous>  valproic  acid Syrup 500 milliGRAM(s) Oral two times a day  voriconazole IVPB 300 milliGRAM(s) IV Intermittent every 12 hours    MEDICATIONS  (PRN):  acetaminophen  IVPB. 1000 milliGRAM(s) IV Intermittent once PRN Moderate Pain (4 - 6)  ALBUTerol    90 MICROgram(s) HFA Inhaler 2 Puff(s) Inhalation every 6 hours PRN Shortness of Breath and/or Wheezing  dextrose Gel 1 Dose(s) Oral once PRN Blood Glucose LESS THAN 70 milliGRAM(s)/deciliter  glucagon  Injectable 1 milliGRAM(s) IntraMuscular once PRN Glucose LESS THAN 70 milligrams/deciliter    Vital Signs Last 24 Hrs  T(C): 35.6 (19 Nov 2017 08:00), Max: 36.7 (18 Nov 2017 12:00)  T(F): 96 (19 Nov 2017 08:00), Max: 98.1 (18 Nov 2017 12:00)  HR: 77 (19 Nov 2017 11:00) (69 - 116)  BP: 97/69 (19 Nov 2017 08:00) (95/65 - 131/89)  BP(mean): 75 (19 Nov 2017 08:00) (72 - 101)  RR: 24 (19 Nov 2017 11:00) (8 - 24)  SpO2: 100% (19 Nov 2017 11:00) (96% - 100%)  Mode: AC/ CMV (Assist Control/ Continuous Mandatory Ventilation)  RR (machine): 24  TV (machine): 350  FiO2: 40  PEEP: 5  MAP: 17  PIP: 43    I&O's Summary    18 Nov 2017 07:01  -  19 Nov 2017 07:00  --------------------------------------------------------  IN: 3372.5 mL / OUT: 1725 mL / NET: 1647.5 mL    19 Nov 2017 07:01  -  19 Nov 2017 11:38  --------------------------------------------------------  IN: 1002 mL / OUT: 515 mL / NET: 487 mL    Physical Exams:  Const: NAD, mechanically paralyzed with nimbex, sedated with Fentanyl and versed    Respiratory: coarse. wheezing.   CVS: S1, S2 no murmur. SR with tele  GI: abd soft non tender. BS present   CNS: paralyzed and sedated, pupils are sluggish to light   SKIN: normal turgor, normal color, dry not diaphoretic   : Pickens catheter    Labs:  ABG - ( 19 Nov 2017 04:45 )  pH: 7.48  /  pCO2: 43    /  pO2: 184   / HCO3: 31    / Base Excess: 7.5   /  SaO2: 99.7                              7.8    8.06  )-----------( 61       ( 19 Nov 2017 07:20 )             24.7     11-19    135  |  95<L>  |  16  ----------------------------<  177<H>  3.9   |  30  |  0.62    Ca    8.3<L>      19 Nov 2017 04:45  Phos  4.0     11-18  Mg     1.7     11-18    TPro  5.1<L>  /  Alb  2.6<L>  /  TBili  0.2  /  DBili  x   /  AST  14  /  ALT  11  /  AlkPhos  52  11-19    CAPILLARY BLOOD GLUCOSE      POCT Blood Glucose.: 142 mg/dL (19 Nov 2017 11:22)  POCT Blood Glucose.: 150 mg/dL (19 Nov 2017 06:16)  POCT Blood Glucose.: 150 mg/dL (19 Nov 2017 01:04)  POCT Blood Glucose.: 159 mg/dL (18 Nov 2017 17:38)    LIVER FUNCTIONS - ( 19 Nov 2017 04:45 )  Alb: 2.6 g/dL / Pro: 5.1 g/dL / ALK PHOS: 52 u/L / ALT: 11 u/L / AST: 14 u/L / GGT: x           PT/INR - ( 18 Nov 2017 03:40 )   PT: 13.0 SEC;   INR: 1.16          PTT - ( 18 Nov 2017 03:40 )  PTT:25.9 SEC    D DImer  Cultures:           Wound culture:                11-15 @ 16:22  Organism --  Culture w/ gram stain --  Specimen Source BRONCHIAL LAVAGE    Wound culture:                11-15 @ 09:22  Organism --  Culture w/ gram stain --  Specimen Source BLOOD    Wound culture:                11-14 @ 00:44  Organism --  Culture w/ gram stain --  Specimen Source SPUTUM    Wound culture:                11-13 @ 13:26  Organism --  Culture w/ gram stain --  Specimen Source URINE MIDSTREAM    Wound culture:                11-13 @ 13:20  Organism --  Culture w/ gram stain --  Specimen Source BLOOD PERIPHERAL      Abscess culture:             11-15 @ 16:22  Organism --  Gram Stain --  Specimen Source BRONCHIAL LAVAGE    Abscess culture:             11-15 @ 09:22  Organism --  Gram Stain --  Specimen Source BLOOD    Abscess culture:             11-14 @ 00:44  Organism --  Gram Stain --  Specimen Source SPUTUM    Abscess culture:             11-13 @ 13:26  Organism --  Gram Stain --  Specimen Source URINE MIDSTREAM    Abscess culture:             11-13 @ 13:20  Organism --  Gram Stain --  Specimen Source BLOOD PERIPHERAL        Tissue culture:           11-15 @ 16:22  Organism --  Gram Stain --  Specimen Source BRONCHIAL LAVAGE    Tissue culture:           11-15 @ 09:22  Organism --  Gram Stain --  Specimen Source BLOOD    Tissue culture:           11-14 @ 00:44  Organism --  Gram Stain --  Specimen Source SPUTUM    Tissue culture:           11-13 @ 13:26  Organism --  Gram Stain --  Specimen Source URINE MIDSTREAM    Tissue culture:           11-13 @ 13:20  Organism --  Gram Stain --  Specimen Source BLOOD PERIPHERAL      Body Fluid Smear & Culture:                        11-15 @ 16:22  AFB Smear  --  Culture Acid Fast Body Fluid w/ Smear  --  Culture Acid Fast Smear Concentrated     AFB SMEAR= NO ACID FAST BACILLI SEEN    Culture Results:     --  Specimen Source BRONCHIAL LAVAGE    Body Fluid Smear & Culture:                        11-15 @ 09:22  AFB Smear  --  Culture Acid Fast Body Fluid w/ Smear  --  Culture Acid Fast Smear Concentrated   --    Culture Results:     --  Specimen Source BLOOD    Body Fluid Smear & Culture:                        11-14 @ 00:44  AFB Smear  --  Culture Acid Fast Body Fluid w/ Smear  --  Culture Acid Fast Smear Concentrated   --    Culture Results:     --  Specimen Source SPUTUM    Body Fluid Smear & Culture:                        11-13 @ 13:26  AFB Smear  --  Culture Acid Fast Body Fluid w/ Smear  --  Culture Acid Fast Smear Concentrated   --    Culture Results:     --  Specimen Source URINE MIDSTREAM    Body Fluid Smear & Culture:                        11-13 @ 13:20  AFB Smear  --  Culture Acid Fast Body Fluid w/ Smear  --  Culture Acid Fast Smear Concentrated   --    Culture Results:     --  Specimen Source BLOOD PERIPHERAL        Rapid Respiratory Viral Panel Result        11-13 @ 15:46  Rapid RVP --  Coronovirus --  Adenovirus NOT DETECTED  Bordetella Pertussis NOT DETECTED  Chlamydia Pneumonia NOT DETECTED  Entero/RhinovirusNOT DETECTED  HKU1 Coronovirus --  HMPV Coronovirus NOT DETECTED  Influenza A NOT DETECTED (any subtype)  Influenza AH1 NOT DETECTED  Influenza AH1 2009 NOT DETECTED  Influenza AH3 NOT DETECTED  Influenza B NOT DETECTED  Mycoplasma Pneumoniae NOT DETECTED This nucleic acid amplification assay was performed using  the [employer] FilmArray. The following pathogens are tested  for: Adenovirus, Coronavirus 229E, Coronavirus HKU1,  Coronavirus NL63, Coronavirus OC43, Human Metapneumovirus  (HMPV), Rhinovirus/Enterovirus, Influenza A H1, Influenza A  H1 2009 (Pandemic H1 2009), Influenza A H3, Influenza A (Flu  A) subtype not identified, Influenza B, Parainfluenza Virus  (types 1, 2, 3, 4), Respiratory Syncytial Virus (RSV),  Bordetella pertussis, Chlamydophila pneumoniae, and  Mycoplasma pneumoniae. A negative FilmArray result does not  always exclude the possibility of viral or bacterial  infection. Laboratory results should always be interpreted  in the context of clinical findings.  NL63 Coronovirus --  OC43 Coronovirus --  Parainfluenza 1 NOT DETECTED  Parainfluenza 2 NOT DETECTED  Parainfluenza 3 NOT DETECTED  Parainfluenza 4 NOT DETECTED  Resp Syncytial Virus NOT DETECTED        Studies  Chest X-RAY < from: Xray Chest 1 View AP- PORTABLE-Urgent (11.18.17 @ 08:59) >    EXAM:  RAD CHEST PORTABLE URGENT        PROCEDURE DATE:  Nov 18 2017         INTERPRETATION:  EXAMINATION: RAD CHEST PORTABLE URGENT    CLINICAL INDICATION: PTX    TECHNIQUE: Frontal radiograph of the chest was obtained.    COMPARISON: 11/18/1979.    FINDINGS:     Cardiac silhouette normal in size. Endotracheal tube tip overlies the   thoracic inlet. Unchanged chronic sarcoidosis related changes with   left-sided surgical sutures and moderate left pneumothorax.    IMPRESSION:   No change from prior study.    < end of copied text >    < end of copied text >   < from: CT Angio Chest w/ IV Cont (11.10.17 @ 11:10) >    EXAM:  CT ANGIO CHEST (W)AW IC        PROCEDURE DATE:  Nov 10 2017         INTERPRETATION:  CLINICAL INFORMATION: Hemoptysis, history of sarcoid and   aspergilloma, status post left upper lobectomy    COMPARISON: CT chest 11/3/2017    PROCEDURE:   CT Angiography of the Chest.  90 ml of Omnipaque 350 was injected intravenously. 10 ml were discarded.  Sagittal and coronal reformats were performed as well as MIPS.    FINDINGS:    CHEST:     PULMONARY ARTERY: Suboptimal study of the pulmonary artery and its distal   branches. No definite pulmonary embolism. The pulmonary artery is   enlarged, measuring 3.7 cm. No active pulmonary hemorrhage.  LUNGS AND LARGE AIRWAYS: Status post left upper lobe lobectomy. Unchanged   patchy peribronchial opacities, predominantly in the right upper lobe.   Unchanged cystic changes of the lungs bilaterally.  PLEURA: Unchanged left pneumothorax. Small left pleural effusion of mixed   density.  VESSELS: Normal left-sided aortic arch and descending aorta. No aneurysm.   No atherosclerotic changes.  HEART: Heart size is normal. Trace pericardial effusion.   MEDIASTINUM AND ERIC: No lymphadenopathy.  CHEST WALL AND LOWER NECK: Within normal limits.  VISUALIZED UPPER ABDOMEN: Bilateral nephrolithiasis without   hydronephrosis.  BONES: Unchanged left seventh rib thoracotomy defect. Mild degenerative   changes of the spine.    IMPRESSION: No active pulmonary hemorrhage.    Unchanged left pneumothorax.    Unchanged patchy peribronchial opacities and cystic changes, possibly   related to patient's known sarcoidosis.    Unchanged small left pleural effusion mixed density, possible element of   proteinaceous or hemorrhagic fluid.    < end of copied text >    CT Abdomen   Venous Dopplers: LE: < from: US Duplex Venous Lower Ext Complete, Bilateral (10.24.17 @ 11:42) >    EXAM:  US DPLX LWR EXT VEINS COMPL BI        PROCEDURE DATE:  Oct 24 2017         INTERPRETATION:  CLINICAL INFORMATION: Lower extremity swelling.    COMPARISON: None available.    TECHNIQUE: Duplex sonography of the BILATERAL LOWER extremities with   color and spectral Doppler, with and without compression.      FINDINGS:    There is normal compressibility of the bilateral common femoral, femoral   and popliteal veins. No calf vein thrombosis is detected.    Doppler examination shows normal spontaneous and phasic flow.    IMPRESSION:     No evidence of bilateral lower extremity deep venous thrombosis.    < end of copied text >    Others    < from: Transthoracic Echocardiogram (11.16.17 @ 13:25) >    Patient name: Miguel Dickens  YOB: 1970   Age: 47 (M)   MR#: [id number]  Study Date: 11/16/2017  [facility]  Study quality: Technically Difficult  Referring Physician: Rodrigue Caldera MD  Blood Pressure: 87/69 mmHg  Height: 180 cm  Weight: 97 kg  BSA: 2.2 m2  ------------------------------------------------------------------------  PROCEDURE: Transthoracic echocardiogram with 2-D, M-Mode  and complete spectral and color flow Doppler.  INDICATION: Chronic diastolic (congestive) heart failure  (I50.32)  ------------------------------------------------------------------------  DIMENSIONS:  Dimensions:     Normal Values:  LA:     3.6 cm    2.0 - 4.0 cm  Ao:     2.7 cm    2.0 - 3.8 cm  SEPTUM:   ---    0.6 - 1.2 cm  PWT:      ---     0.6 - 1.1 cm  LVIDd:    ---     3.0 - 5.6 cm  LVIDs:    ---     1.8 - 4.0 cm  ------------------------------------------------------------------------  OBSERVATIONS:  Mitral Valve: Normal mitral valve.  Aortic Root: Normal aortic root.  Aortic Valve: Aortic valve not well visualized; probably  normal.  Left Atrium: Normal left atrium.  Left Ventricle: Technically very difficult/limited left  ventricle views. Grossly  at least moderate global left  ventricular systolic dysfunction.  Right Heart: Normal right atrium. The right ventricle is  not well visualized appears grossly enlarged with decreased  function.  Normal tricuspid valve. Mild tricuspid  regurgitation. Normal pulmonic valve.  Pericardium/PleuraNormal pericardium with trace pericardial  effusion.  Hemodynamic: Estimated right ventricular systolic pressure  equals 50 mm Hg, assuming right atrial pressure equals 10  mm Hg, consistent with moderate pulmonary hypertension.  ------------------------------------------------------------------------  CONCLUSIONS:  1. Technically very difficult/limited left ventricle views.  Grossly  at least moderate global left ventricular systolic  dysfunction.  2. The right ventricle is not well visualized appears  grossly enlarged with decreased function.  3. Estimated pulmonary artery systolic pressure equals 50  mm Hg, assuming right atrial pressure equals 10  mm Hg,  consistent with moderate pulmonary hypertension.  *** Compared with echocardiogram of 9/26/2017, LV function  has decreased.    < end of copied text >

## 2017-11-19 NOTE — PROGRESS NOTE ADULT - PROBLEM SELECTOR PLAN 2
? 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 ot from bronchiectasis? eliquis have been dced: needs card to se: In addition, he may need IR help with embolization, it his hemoptysis increases: He has had pleurodesis on the left side.  11/10 quantify hemoptysis. no AC. stable  11/11 no more hemoptysis. off AC  11/12 resolved. off AC  11/13: today had hemoptysis again----->localize site---> IR embolization: left a message for thoracic team  11/14: DW Dr Caldera: for repeat bronchoscopy to localize the site of bleeding and need IR help to embolize the culprit vessel!  11/15: started on broad spectrum antibiotics and for bronchoscopy today  11/16: s/p IR emobolization done: monitor for more hemoptysis  11/17: s/p bronchsocopy: reportedly old blood seen in airways!  11/18 s/p bronchoscopy in ICU via ETT. old clot. no active bleeding ? 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 ot from bronchiectasis? eliquis have been dced: needs card to se: In addition, he may need IR help with embolization, it his hemoptysis increases: He has had pleurodesis on the left side.  11/10 quantify hemoptysis. no AC. stable  11/11 no more hemoptysis. off AC  11/12 resolved. off AC  11/13: today had hemoptysis again----->localize site---> IR embolization: left a message for thoracic team  11/14: DW Dr Caldera: for repeat bronchoscopy to localize the site of bleeding and need IR help to embolize the culprit vessel!  11/15: started on broad spectrum antibiotics and for bronchoscopy today  11/16: s/p IR emobolization done: monitor for more hemoptysis  11/17: s/p bronchsocopy: reportedly old blood seen in airways!  11/18 s/p bronchoscopy in ICU via ETT. old clot. no active bleeding  11/19 stable. no active bleeding per recent bronchoscopy

## 2017-11-20 LAB
BACTERIA BLD CULT: SIGNIFICANT CHANGE UP
BACTERIA BLD CULT: SIGNIFICANT CHANGE UP
BASE EXCESS BLDA CALC-SCNC: 8 MMOL/L — SIGNIFICANT CHANGE UP
BUN SERPL-MCNC: 17 MG/DL — SIGNIFICANT CHANGE UP (ref 7–23)
CALCIUM SERPL-MCNC: 8.6 MG/DL — SIGNIFICANT CHANGE UP (ref 8.4–10.5)
CHLORIDE SERPL-SCNC: 97 MMOL/L — LOW (ref 98–107)
CO2 SERPL-SCNC: 31 MMOL/L — SIGNIFICANT CHANGE UP (ref 22–31)
CREAT SERPL-MCNC: 0.75 MG/DL — SIGNIFICANT CHANGE UP (ref 0.5–1.3)
GLUCOSE SERPL-MCNC: 158 MG/DL — HIGH (ref 70–99)
HCO3 BLDA-SCNC: 31 MMOL/L — HIGH (ref 22–26)
HCT VFR BLD CALC: 26.1 % — LOW (ref 39–50)
HEPARIN CF II AG PPP-ACNC: 0.16 — SIGNIFICANT CHANGE UP (ref 0–0.39)
HGB BLD-MCNC: 8.6 G/DL — LOW (ref 13–17)
MAGNESIUM SERPL-MCNC: 1.8 MG/DL — SIGNIFICANT CHANGE UP (ref 1.6–2.6)
MCHC RBC-ENTMCNC: 31.6 PG — SIGNIFICANT CHANGE UP (ref 27–34)
MCHC RBC-ENTMCNC: 33 % — SIGNIFICANT CHANGE UP (ref 32–36)
MCV RBC AUTO: 96 FL — SIGNIFICANT CHANGE UP (ref 80–100)
NRBC # FLD: 0 — SIGNIFICANT CHANGE UP
PCO2 BLDA: 54 MMHG — HIGH (ref 35–48)
PH BLDA: 7.4 PH — SIGNIFICANT CHANGE UP (ref 7.35–7.45)
PHOSPHATE SERPL-MCNC: 2.9 MG/DL — SIGNIFICANT CHANGE UP (ref 2.5–4.5)
PLATELET # BLD AUTO: 82 K/UL — LOW (ref 150–400)
PMV BLD: 13.5 FL — HIGH (ref 7–13)
PO2 BLDA: 156 MMHG — HIGH (ref 83–108)
POTASSIUM SERPL-MCNC: 4.8 MMOL/L — SIGNIFICANT CHANGE UP (ref 3.5–5.3)
POTASSIUM SERPL-SCNC: 4.8 MMOL/L — SIGNIFICANT CHANGE UP (ref 3.5–5.3)
RBC # BLD: 2.72 M/UL — LOW (ref 4.2–5.8)
RBC # FLD: 14.6 % — HIGH (ref 10.3–14.5)
SAO2 % BLDA: 99.7 % — HIGH (ref 95–99)
SODIUM SERPL-SCNC: 137 MMOL/L — SIGNIFICANT CHANGE UP (ref 135–145)
VALPROATE SERPL-MCNC: 28.2 UG/ML — LOW (ref 50–100)
WBC # BLD: 11.77 K/UL — HIGH (ref 3.8–10.5)
WBC # FLD AUTO: 11.77 K/UL — HIGH (ref 3.8–10.5)

## 2017-11-20 PROCEDURE — 71010: CPT | Mod: 26

## 2017-11-20 PROCEDURE — 99291 CRITICAL CARE FIRST HOUR: CPT

## 2017-11-20 PROCEDURE — 99232 SBSQ HOSP IP/OBS MODERATE 35: CPT

## 2017-11-20 RX ORDER — PROPOFOL 10 MG/ML
30 INJECTION, EMULSION INTRAVENOUS
Qty: 1000 | Refills: 0 | Status: DISCONTINUED | OUTPATIENT
Start: 2017-11-20 | End: 2017-11-22

## 2017-11-20 RX ORDER — FUROSEMIDE 40 MG
20 TABLET ORAL ONCE
Qty: 0 | Refills: 0 | Status: COMPLETED | OUTPATIENT
Start: 2017-11-20 | End: 2017-11-20

## 2017-11-20 RX ADMIN — PROPOFOL 13.5 MICROGRAM(S)/KG/MIN: 10 INJECTION, EMULSION INTRAVENOUS at 09:10

## 2017-11-20 RX ADMIN — FENTANYL CITRATE 7.5 MICROGRAM(S)/KG/HR: 50 INJECTION INTRAVENOUS at 23:12

## 2017-11-20 RX ADMIN — Medication 325 MILLIGRAM(S): at 18:11

## 2017-11-20 RX ADMIN — Medication 1 PUFF(S): at 21:54

## 2017-11-20 RX ADMIN — PANTOPRAZOLE SODIUM 40 MILLIGRAM(S): 20 TABLET, DELAYED RELEASE ORAL at 13:30

## 2017-11-20 RX ADMIN — FENTANYL CITRATE 7.5 MICROGRAM(S)/KG/HR: 50 INJECTION INTRAVENOUS at 09:10

## 2017-11-20 RX ADMIN — SENNA PLUS 5 MILLILITER(S): 8.6 TABLET ORAL at 07:08

## 2017-11-20 RX ADMIN — PHENYLEPHRINE HYDROCHLORIDE 2.81 MICROGRAM(S)/KG/MIN: 10 INJECTION INTRAVENOUS at 09:09

## 2017-11-20 RX ADMIN — Medication 20 MILLIGRAM(S): at 18:10

## 2017-11-20 RX ADMIN — Medication 500 MILLIGRAM(S): at 18:10

## 2017-11-20 RX ADMIN — PROPOFOL 13.5 MICROGRAM(S)/KG/MIN: 10 INJECTION, EMULSION INTRAVENOUS at 23:12

## 2017-11-20 RX ADMIN — CHLORHEXIDINE GLUCONATE 1 APPLICATION(S): 213 SOLUTION TOPICAL at 06:32

## 2017-11-20 RX ADMIN — FENTANYL CITRATE 1 MICROGRAM(S)/KG/HR: 50 INJECTION INTRAVENOUS at 09:10

## 2017-11-20 RX ADMIN — Medication 1 PUFF(S): at 16:02

## 2017-11-20 RX ADMIN — VORICONAZOLE 125 MILLIGRAM(S): 10 INJECTION, POWDER, LYOPHILIZED, FOR SOLUTION INTRAVENOUS at 10:44

## 2017-11-20 RX ADMIN — Medication 1 PUFF(S): at 03:56

## 2017-11-20 RX ADMIN — SENNA PLUS 5 MILLILITER(S): 8.6 TABLET ORAL at 23:11

## 2017-11-20 RX ADMIN — Medication 1 PUFF(S): at 09:44

## 2017-11-20 RX ADMIN — BUDESONIDE AND FORMOTEROL FUMARATE DIHYDRATE 2 PUFF(S): 160; 4.5 AEROSOL RESPIRATORY (INHALATION) at 09:43

## 2017-11-20 RX ADMIN — POLYETHYLENE GLYCOL 3350 17 GRAM(S): 17 POWDER, FOR SOLUTION ORAL at 23:11

## 2017-11-20 RX ADMIN — CHLORHEXIDINE GLUCONATE 15 MILLILITER(S): 213 SOLUTION TOPICAL at 07:00

## 2017-11-20 RX ADMIN — Medication 1 MILLIGRAM(S): at 13:30

## 2017-11-20 RX ADMIN — ATORVASTATIN CALCIUM 80 MILLIGRAM(S): 80 TABLET, FILM COATED ORAL at 23:11

## 2017-11-20 RX ADMIN — SENNA PLUS 5 MILLILITER(S): 8.6 TABLET ORAL at 13:30

## 2017-11-20 RX ADMIN — MIDAZOLAM HYDROCHLORIDE 5 MG/HR: 1 INJECTION, SOLUTION INTRAMUSCULAR; INTRAVENOUS at 09:09

## 2017-11-20 RX ADMIN — Medication 1 APPLICATION(S): at 06:33

## 2017-11-20 RX ADMIN — Medication 325 MILLIGRAM(S): at 08:36

## 2017-11-20 RX ADMIN — BUDESONIDE AND FORMOTEROL FUMARATE DIHYDRATE 2 PUFF(S): 160; 4.5 AEROSOL RESPIRATORY (INHALATION) at 21:56

## 2017-11-20 RX ADMIN — Medication: at 13:43

## 2017-11-20 RX ADMIN — Medication 1 APPLICATION(S): at 18:10

## 2017-11-20 RX ADMIN — Medication 325 MILLIGRAM(S): at 13:30

## 2017-11-20 RX ADMIN — Medication 500 MILLIGRAM(S): at 06:27

## 2017-11-20 RX ADMIN — CHLORHEXIDINE GLUCONATE 15 MILLILITER(S): 213 SOLUTION TOPICAL at 18:10

## 2017-11-20 RX ADMIN — Medication 20 MILLIGRAM(S): at 06:25

## 2017-11-20 RX ADMIN — VORICONAZOLE 125 MILLIGRAM(S): 10 INJECTION, POWDER, LYOPHILIZED, FOR SOLUTION INTRAVENOUS at 23:11

## 2017-11-20 RX ADMIN — Medication 20 MILLIGRAM(S): at 06:22

## 2017-11-20 NOTE — PROGRESS NOTE ADULT - SUBJECTIVE AND OBJECTIVE BOX
OSCAR CHACKO  MRN-[id number]    HPI:  47 year old male with a PMHx of sarcoidosis, HTN, asthma, and COPD presents to the ED with severe leg spasms and headache leading to a presyncopal episode, found to have a significant abnormal EKG and moderate inspiratory and expiratory wheezing in the setting of Entero/Rhino virus, admitted to tele for Near Syncope, r/o ACS and COPD exacerbation.  Hospital course complicated by stroke and PAF- AC on hold due to hemoptysis; 9/21-FB/BAL done by Dr. Arias. Intraop: Bleeding from DAVID (non-lingular)-Recom IR to embolize; 9/22 CT angio done per IR Lobko request.  Formal PFTS and ask Card to fix PFO now to decrease blood flow; 9/24- Developed worsening hemoptysis. Emergently kristel to OR and had  Left thoractomy DAVID. Brought to CTICU.  9/24: 1400ml EBL in OR; 10 u PRBCs;9/25-Brought back to OR for bleeding. Had Reop Thoracotomy, evac left hemothorax. CTI- DAVID space despite -40 Sxn, MEYER, L Hemiparesis (CVA) better w walking; Patient post op with prolongd air leak shila ptx, bedside pleurodesis done.  Developed SIRS after pleurodesis-> CTICU; On pressors. Pt. clinically improved in CTICU. Maintained on Steroids, weaned down to Solumedrol BID. Off pressors and transitioned to Midodrine. Still with thrombocytopenia but no bleeding. Continued on Eliquis but no ASA for h/o CVA. Neuro deficits improving.  Air leak resolved on 10/27. 10/30 Chest tube removed, cxr stable.  Steriods to po taper.  Patient stable for discharge.  11/2-Pt ambulating w PT frequently. Feels well. Pain controlled. All labs and vital signs stable. Wound healing. Arnold po steroid taper. To complete Voriconazole on Nov 4th. CXRs and reports reviewed by and cleared by Dr. Caldera. Pt. to fu for PFO closure as output. Still requires intermittent oxygen. Cleared for discharge by Dr. Caldera to acute rehab.    Patient presented to [place] [facility] after rehab send patient with hemoptysis, patient had 2 episodes of hemoptysis, less than 10cc in total for 24 hours.  Patient states it happens when he coughs, blood tinged sputum.  Patient otherwise doing well in rehab. (09 Nov 2017 00:32)      Procedure:   [id number]:   Left VATS and DAVID lobectomy 9/24/17 [id number]:   Re-op L thoracotomy, evacuation of hemothorax (600cc clot, 400cc blood) no obvious source of bleeding      Re-op L thoracotomy, evacuation of hemothorax; RTOR x2 100cc clot; 200cc blood: bleeding posterior intercostal arteries identified and cauterized 9/25/17   [id number]:   L Pigtail catheter (IR) for L apical PTX.  (Transferred back to ICU 2/2 increased SOB)  [id number]   Bedside talc pleurodesis    Pt eventually discharged to rehab - readmitted on [id number]    [id number]:   Bronchoscopy with bronchoalveolar lavage  in OR- Remained intubated  [id number]:   Repeat bronchoscopy in ICU for persistent hemoptysis via ETT; bronchial block placement into left main bronchus  [id number]:   IR left bronchial artery embolization, Bronchoscopy with BAL post IR embolization - w/o evidence of active bleeding  [id number]:   Repeat bronchoscopy in ICU  via ETT; No evidence of active bleeding. 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  COPD (chronic obstructive pulmonary disease)  Asthma  Hypertension  Sarcoidosis  History of thoracotomy: 9/24/17 Left thoracotomy, Left upper lobectomy   9/25/17 Reop left thoracotomy, evacuation of left hemothorax      ***VITAL SIGNS:  Vital Signs Last 24 Hrs  T(C): 36.7 (20 Nov 2017 16:00), Max: 36.9 (19 Nov 2017 20:00)  T(F): 98 (20 Nov 2017 16:00), Max: 98.4 (19 Nov 2017 20:00)  HR: 94 (20 Nov 2017 16:02) (77 - 126)  BP: 127/75 (20 Nov 2017 08:00) (99/63 - 142/103)  BP(mean): 86 (20 Nov 2017 08:00) (71 - 109)  RR: 24 (20 Nov 2017 16:00) (20 - 25)  SpO2: 97% (20 Nov 2017 16:02) (93% - 100%)  I/Os:   I&O's Detail    19 Nov 2017 07:01  -  20 Nov 2017 07:00  --------------------------------------------------------  IN:    cisatracurium Infusion: 69 mL    cisatracurium Infusion: 64 mL    Enteral Tube Flush: 150 mL    fentaNYL  Infusion: 836.5 mL    IV PiggyBack: 700 mL    Jevity: 1275 mL    midazolam Infusion: 40 mL    midazolam Infusion: 64 mL    sodium chloride 0.9%: 360 mL  Total IN: 3558.5 mL    OUT:    Indwelling Catheter - Urethral: 1475 mL  Total OUT: 1475 mL    Total NET: 2083.5 mL      20 Nov 2017 07:01  -  20 Nov 2017 16:14  --------------------------------------------------------  IN:    Enteral Tube Flush: 60 mL    fentaNYL  Infusion: 256 mL    IV PiggyBack: 250 mL    Jevity: 665 mL    midazolam Infusion: 4 mL    phenylephrine   Infusion: 44 mL    propofol Infusion: 40 mL  Total IN: 1319 mL    OUT:    Indwelling Catheter - Urethral: 915 mL  Total OUT: 915 mL    Total NET: 404 mL        CAPILLARY BLOOD GLUCOSE      POCT Blood Glucose.: 180 mg/dL (20 Nov 2017 13:34)  POCT Blood Glucose.: 123 mg/dL (20 Nov 2017 06:35)  POCT Blood Glucose.: 149 mg/dL (19 Nov 2017 23:52)  POCT Blood Glucose.: 140 mg/dL (19 Nov 2017 18:04)    =======================  VENTILATOR SETTINGS  ===================  Mode: AC/ CMV (Assist Control/ Continuous Mandatory Ventilation)  RR (machine): 24  TV (machine): 350  FiO2: 40  PEEP: 5  MAP: 19  PIP: 59    ======================= PATIENT CARE ACCESS DEVICES ===================  Peripheral IV    ======================= PHYSICAL EXAM============================  General:                         sedated, intubated, on full vent support  Neuro:                            Moving all extremities to commands. No acute change from baseline exam.	  Respiratory:	          Air entry is decreased at left apex , has bilateral conducted sounds                                           CV:		Regular rate and rhythm. Normal S1/S2. No murmurs                                          Distal pulses present.  Abdomen:	                     Soft, non-distended. Bowel sounds present   Skin:		No rash.  Extremities:	Warm, no cyanosis or edema.  Palpable pulses    ============================ LABS =========================                        8.6    11.77 )-----------( 82       ( 20 Nov 2017 04:00 )             26.1     11-20    137  |  97<L>  |  17  ----------------------------<  158<H>  4.8   |  31  |  0.75    Ca    8.6      20 Nov 2017 04:00  Phos  2.9     11-20  Mg     1.8     11-20    TPro  5.1<L>  /  Alb  2.6<L>  /  TBili  0.2  /  DBili  x   /  AST  14  /  ALT  11  /  AlkPhos  52  11-19    LIVER FUNCTIONS - ( 19 Nov 2017 04:45 )  Alb: 2.6 g/dL / Pro: 5.1 g/dL / ALK PHOS: 52 u/L / ALT: 11 u/L / AST: 14 u/L / GGT: x             ABG - ( 20 Nov 2017 04:00 )  pH: 7.40  /  pCO2: 54    /  pO2: 156   / HCO3: 31    / Base Excess: 8.0   /  SaO2: 99.7                ===================== IMAGING STUDIES =========================          =======================  MEDICATIONS  =================  MEDICATIONS  (STANDING):  atorvastatin 80 milliGRAM(s) Oral at bedtime  buDESOnide 160 MICROgram(s)/formoterol 4.5 MICROgram(s) Inhaler 2 Puff(s) Inhalation two times a day  chlorhexidine 0.12% Liquid 15 milliLiter(s) Swish and Spit two times a day  chlorhexidine 4% Liquid 1 Application(s) Topical daily  dextrose 5%. 1000 milliLiter(s) (50 mL/Hr) IV Continuous <Continuous>  dextrose 50% Injectable 12.5 Gram(s) IV Push once  dextrose 50% Injectable 25 Gram(s) IV Push once  dextrose 50% Injectable 25 Gram(s) IV Push once  fentaNYL   Infusion 1 MICROgram(s)/kG/Hr (7.5 mL/Hr) IV Continuous <Continuous>  ferrous    sulfate 325 milliGRAM(s) Oral three times a day with meals  folic acid 1 milliGRAM(s) Oral daily  insulin lispro (HumaLOG) corrective regimen sliding scale   SubCutaneous every 6 hours  ipratropium 17 MICROgram(s) HFA Inhaler 1 Puff(s) Inhalation every 6 hours  methylPREDNISolone sodium succinate Injectable 20 milliGRAM(s) IV Push every 12 hours  pantoprazole  Injectable 40 milliGRAM(s) IV Push daily  petrolatum Ophthalmic Ointment 1 Application(s) Both EYES two times a day  phenylephrine    Infusion 0.1 MICROgram(s)/kG/Min (2.813 mL/Hr) IV Continuous <Continuous>  polyethylene glycol 3350 17 Gram(s) Oral at bedtime  propofol Infusion 30 MICROgram(s)/kG/Min (13.5 mL/Hr) IV Continuous <Continuous>  senna Syrup 5 milliLiter(s) Oral three times a day  valproic  acid Syrup 500 milliGRAM(s) Oral two times a day  voriconazole IVPB 300 milliGRAM(s) IV Intermittent every 12 hours    MEDICATIONS  (PRN):  acetaminophen  IVPB. 1000 milliGRAM(s) IV Intermittent once PRN Moderate Pain (4 - 6)  ALBUTerol    90 MICROgram(s) HFA Inhaler 2 Puff(s) Inhalation every 6 hours PRN Shortness of Breath and/or Wheezing  dextrose Gel 1 Dose(s) Oral once PRN Blood Glucose LESS THAN 70 milliGRAM(s)/deciliter  glucagon  Injectable 1 milliGRAM(s) IntraMuscular once PRN Glucose LESS THAN 70 milligrams/deciliter      ====================== NEUROLOGY=====================  Sedated, intubated, nonfocal. Pain control  Fentanyl    ==================== RESPIRATORY======================  Intubated, on full vent support. Bedside FB today, them CPAP weaning arnold, Continue bronchodilators, pulmonary toileting   Dr. Caldera to discuss with pulmonary regarding possible candidate for lung transplant   Sarciodosis: Maintanice steriods    Has big space at the left apex , unchanged  MICU consulted   	    Mechanical Ventilation:  Mode: AC/ CMV (Assist Control/ Continuous Mandatory Ventilation)  RR (machine): 24  TV (machine): 350  FiO2: 40  PEEP: 5  MAP: 19  PIP: 59    Mechanical ventilator staus assessed & settings reviewed    ====================CARDIOVASCULAR==================  Continue hemodynamic monitoring.  Not on any pressors  On Tele, hold AC due to bleeding    ===================== RENAL =========================  Continue  IVF  Monitor I/Os and electrolytes  Keep [place]    ==================== GASTROINTESTINAL===================  Start OGT feeds and increase as tolerated    Continue GI prophylaxis with Proton   Continue Zofran / Reglan for nausea - PRN	    =======================    ENDOCRIN  =====================  Glycemic monitoring  F/S with coverage  ===================HEMATOLOGIC/ONC ===================  Monitor chest tube output. No signs of active bleeding.   Follow CBC in AM  DVT prophylaxis with SCD, sc Heparin    ========================INFECTIOUS DISEASE================  Aspergilloma - On voriconazole   All surgical incision / chest tube  sites look clean        Pertinent clinical, laboratory, radiographic, hemodynamic, echocardiographic, respiratory data, microbiologic data and chart were reviewed and analyzed frequently throughout the course of the day and night. GI and DVT prophylaxis, glycemic control, head of bed elevation and skin care issues were addressed.  Patient seen, examined and plan discussed with CT Surgery / CTICU team during rounds.    I have spent  45 minutes of critical care time with this pt between   7 am &7  pm      Flor Pepper DO, MAGUE

## 2017-11-20 NOTE — PROGRESS NOTE ADULT - PROBLEM SELECTOR PLAN 5
controlled.  11/10 stable  11/11 stable  11/12 stable  11/18 off meds. not on pressor  11/19 stable without pressors

## 2017-11-20 NOTE — PROGRESS NOTE ADULT - ATTENDING COMMENTS
pt doing poorly  paralyzed, sedated but not requiring high FIO2 :   no bleeding noted:  try to wean sedation as well as paralyzing agent   Steroids have been decreased: goal would be rapidly tapered down steroids to minimum possible given h e is on sedation and paralyzing agent: But per reports pt gets very agitated when taken of paralyzing agent!    11/20: off paralyzing agent; cont current care: may decrease steroids to 30 mg in a day or two

## 2017-11-20 NOTE — PROGRESS NOTE ADULT - PROBLEM SELECTOR PLAN 3
Cont BD with inhaled steroids and oral steroids  11/114: Pts wheezing has significantly improved: he will need long term steroids as he starts wheezing once his steroids are dced!  11/15: Wheezing has increased: started on IV steroids high dose by thoracic team: Would suggest to decrease to 20 mg three times a day !  11/16: decrease steroids today to 20 mg three times day  11/17: try to decrease steroids in AM  11/18 Duoneb. intubated for acute respiratory distress with hypoxia  11/19 KIARA. intubated and on ventilator support  11/20: cont vent support: start weaning as tolerated!

## 2017-11-20 NOTE — PROGRESS NOTE ADULT - ASSESSMENT
47 year old with sarcoidosis and COPD presents after recent andree surgery with recurrent hemoptysis.     This is liklely multifactorial- Cystic lung changes, sarcoid, ? superimposed infection many all be contributing.     His CT has peribronchial changes.    Completed bacterial course of coverage.    Continue voriconazole pending fungal culture.  Fungal cultures can be lower yield- may be best to treat empirically for 6 more weeks.   Unclear that an ongoing fungal process is involved, but given his critic state- empiric coverage seems safer course.

## 2017-11-20 NOTE — PROGRESS NOTE ADULT - PROBLEM SELECTOR PLAN 1
intubated: try to titrate as tolerated!  11/18 on ventilator support. Pt is sedated and paralyzed. defer ventilator Management/ weaning per CTICU team.  11/19 still sedated and paralyzed. Plan to wean off Nimbex today per Primary RN. ABGs reviewed.  11/20:ff paralyzing agent!But sedated !!

## 2017-11-20 NOTE — PROGRESS NOTE ADULT - PROBLEM SELECTOR PLAN 6
s/p pleurodesis.  11/10 s/p pleurodesis. Management defer to CTS  11/11 no intervention for now. per CTS.  11/13: has mod pneumothorax stable: defer to thoracic for any intervention!  11/12 no intervention planned. bronchoscopy in future per CTS  11/14: Pt still has left sided pneumothorax: s/p blood patch as well as the talc pleurodesis on the left side: Hard to do any other intervention : would defer to thoracic surgical team!!  11/18 management per CTS. not plan for Chest tube at this moment.  11/19 stable radiographically and clinically. Management defer to CTS

## 2017-11-20 NOTE — PROGRESS NOTE ADULT - SUBJECTIVE AND OBJECTIVE BOX
Follow Up:      Inverval History/ROS:Patient is a 47y old  Male who presents with a chief complaint of hemoptysis (09 Nov 2017 00:32)    intubated. sedated. On pressors.      Allergies    No Known Allergies    Intolerances        ANTIMICROBIALS:  voriconazole IVPB 300 every 12 hours      OTHER MEDS:  acetaminophen  IVPB. 1000 milliGRAM(s) IV Intermittent once PRN  ALBUTerol    90 MICROgram(s) HFA Inhaler 2 Puff(s) Inhalation every 6 hours PRN  atorvastatin 80 milliGRAM(s) Oral at bedtime  buDESOnide 160 MICROgram(s)/formoterol 4.5 MICROgram(s) Inhaler 2 Puff(s) Inhalation two times a day  chlorhexidine 0.12% Liquid 15 milliLiter(s) Swish and Spit two times a day  chlorhexidine 4% Liquid 1 Application(s) Topical daily  dextrose 5%. 1000 milliLiter(s) IV Continuous <Continuous>  dextrose 50% Injectable 12.5 Gram(s) IV Push once  dextrose 50% Injectable 25 Gram(s) IV Push once  dextrose 50% Injectable 25 Gram(s) IV Push once  dextrose Gel 1 Dose(s) Oral once PRN  fentaNYL   Infusion 1 MICROgram(s)/kG/Hr IV Continuous <Continuous>  ferrous    sulfate 325 milliGRAM(s) Oral three times a day with meals  folic acid 1 milliGRAM(s) Oral daily  glucagon  Injectable 1 milliGRAM(s) IntraMuscular once PRN  insulin lispro (HumaLOG) corrective regimen sliding scale   SubCutaneous every 6 hours  ipratropium 17 MICROgram(s) HFA Inhaler 1 Puff(s) Inhalation every 6 hours  methylPREDNISolone sodium succinate Injectable 20 milliGRAM(s) IV Push every 12 hours  pantoprazole  Injectable 40 milliGRAM(s) IV Push daily  petrolatum Ophthalmic Ointment 1 Application(s) Both EYES two times a day  phenylephrine    Infusion 0.1 MICROgram(s)/kG/Min IV Continuous <Continuous>  polyethylene glycol 3350 17 Gram(s) Oral at bedtime  propofol Infusion 30 MICROgram(s)/kG/Min IV Continuous <Continuous>  senna Syrup 5 milliLiter(s) Oral three times a day  valproic  acid Syrup 500 milliGRAM(s) Oral two times a day      Vital Signs Last 24 Hrs  T(C): 36.7 (20 Nov 2017 12:00), Max: 36.9 (19 Nov 2017 16:00)  T(F): 98 (20 Nov 2017 12:00), Max: 98.4 (19 Nov 2017 16:00)  HR: 96 (20 Nov 2017 14:00) (77 - 126)  BP: 127/75 (20 Nov 2017 08:00) (99/63 - 142/103)  BP(mean): 86 (20 Nov 2017 08:00) (71 - 109)  RR: 22 (20 Nov 2017 14:00) (20 - 25)  SpO2: 99% (20 Nov 2017 14:00) (96% - 100%)    PHYSICAL EXAM:  General: [ ] non-toxic  HEAD/EYES: [ ] PERRL [x ] white sclera [ ] icterus  ENT:  [ ] normal [ x] supple [ ] thrush [ ] pharyngeal exudate  Cardiovascular:   [ ] murmur [x ] normal [ ] PPM/AICD  Respiratory:  [x ] clear to ausculation bilaterally  GI:  [x ] soft, non-tender, normal bowel sounds  :  [ ] mock [ ] no CVA tenderness   Musculoskeletal:  [x ] no synovitis  Neurologic:  [x ] non-focal exam   Skin:  x[ ] no rash  Lymph: [ ] no lymphadenopathy  Psychiatric:  [ ] appropriate affect [ ] alert & oriented  Lines:  [x ] no phlebitis [ ] central line                                8.6    11.77 )-----------( 82       ( 20 Nov 2017 04:00 )             26.1       11-20    137  |  97<L>  |  17  ----------------------------<  158<H>  4.8   |  31  |  0.75    Ca    8.6      20 Nov 2017 04:00  Phos  2.9     11-20  Mg     1.8     11-20    TPro  5.1<L>  /  Alb  2.6<L>  /  TBili  0.2  /  DBili  x   /  AST  14  /  ALT  11  /  AlkPhos  52  11-19          MICROBIOLOGY:Culture - Respiratory:   NO GROWTH - PRELIMINARY RESULTS  NRF^Normal Respiratory Katherine  QUANTITY OF GROWTH: RARE (11-15-17 @ 16:22)  Culture - Respiratory:   NRF^Normal Respiratory Katherine  QUANTITY OF GROWTH: FEW (11-14-17 @ 00:44)      RADIOLOGY:

## 2017-11-20 NOTE — PROGRESS NOTE ADULT - SUBJECTIVE AND OBJECTIVE BOX
Patient is a 47y old  Male who presents with a chief complaint of hemoptysis (09 Nov 2017 00:32)  still intubated, sedated but off paralyzing agent!!    Any change in ROS:     MEDICATIONS  (STANDING):  atorvastatin 80 milliGRAM(s) Oral at bedtime  buDESOnide 160 MICROgram(s)/formoterol 4.5 MICROgram(s) Inhaler 2 Puff(s) Inhalation two times a day  chlorhexidine 0.12% Liquid 15 milliLiter(s) Swish and Spit two times a day  chlorhexidine 4% Liquid 1 Application(s) Topical daily  dextrose 5%. 1000 milliLiter(s) (50 mL/Hr) IV Continuous <Continuous>  dextrose 50% Injectable 12.5 Gram(s) IV Push once  dextrose 50% Injectable 25 Gram(s) IV Push once  dextrose 50% Injectable 25 Gram(s) IV Push once  fentaNYL   Infusion 1 MICROgram(s)/kG/Hr (7.5 mL/Hr) IV Continuous <Continuous>  ferrous    sulfate 325 milliGRAM(s) Oral three times a day with meals  folic acid 1 milliGRAM(s) Oral daily  insulin lispro (HumaLOG) corrective regimen sliding scale   SubCutaneous every 6 hours  ipratropium 17 MICROgram(s) HFA Inhaler 1 Puff(s) Inhalation every 6 hours  methylPREDNISolone sodium succinate Injectable 20 milliGRAM(s) IV Push every 12 hours  pantoprazole  Injectable 40 milliGRAM(s) IV Push daily  petrolatum Ophthalmic Ointment 1 Application(s) Both EYES two times a day  phenylephrine    Infusion 0.1 MICROgram(s)/kG/Min (2.813 mL/Hr) IV Continuous <Continuous>  polyethylene glycol 3350 17 Gram(s) Oral at bedtime  propofol Infusion 30 MICROgram(s)/kG/Min (13.5 mL/Hr) IV Continuous <Continuous>  senna Syrup 5 milliLiter(s) Oral three times a day  valproic  acid Syrup 500 milliGRAM(s) Oral two times a day  voriconazole IVPB 300 milliGRAM(s) IV Intermittent every 12 hours    MEDICATIONS  (PRN):  acetaminophen  IVPB. 1000 milliGRAM(s) IV Intermittent once PRN Moderate Pain (4 - 6)  ALBUTerol    90 MICROgram(s) HFA Inhaler 2 Puff(s) Inhalation every 6 hours PRN Shortness of Breath and/or Wheezing  dextrose Gel 1 Dose(s) Oral once PRN Blood Glucose LESS THAN 70 milliGRAM(s)/deciliter  glucagon  Injectable 1 milliGRAM(s) IntraMuscular once PRN Glucose LESS THAN 70 milligrams/deciliter    Vital Signs Last 24 Hrs  T(C): 36.7 (20 Nov 2017 16:00), Max: 36.9 (19 Nov 2017 20:00)  T(F): 98 (20 Nov 2017 16:00), Max: 98.4 (19 Nov 2017 20:00)  HR: 94 (20 Nov 2017 16:02) (77 - 126)  BP: 127/75 (20 Nov 2017 08:00) (99/63 - 142/103)  BP(mean): 86 (20 Nov 2017 08:00) (71 - 109)  RR: 24 (20 Nov 2017 16:00) (20 - 25)  SpO2: 97% (20 Nov 2017 16:02) (93% - 100%)  Mode: AC/ CMV (Assist Control/ Continuous Mandatory Ventilation)  RR (machine): 24  TV (machine): 350  FiO2: 40  PEEP: 5  MAP: 19  PIP: 59    I&O's Summary    19 Nov 2017 07:01  -  20 Nov 2017 07:00  --------------------------------------------------------  IN: 3558.5 mL / OUT: 1475 mL / NET: 2083.5 mL    20 Nov 2017 07:01  -  20 Nov 2017 16:59  --------------------------------------------------------  IN: 1319 mL / OUT: 915 mL / NET: 404 mL          Physical Exam:   GENERAL: NAD, well-groomed, well-developed  HEENT: GIOVANI/   Atraumatic, Normocephalic  ENMT: No tonsillar erythema, exudates, or enlargement; Moist mucous membranes, Good dentition, No lesions  NECK: Supple, No JVD, Normal thyroid  CHEST/LUNG: coarse rhonchi++  CVS: Regular rate and rhythm; No murmurs, rubs, or gallops  GI: : Soft, Nontender, Nondistended; Bowel sounds present  NERVOUS SYSTEM:  sedated  EXTREMITIES:  2+ Peripheral Pulses, No clubbing, cyanosis, or edema  LYMPH: No lymphadenopathy noted  SKIN: No rashes or lesions  ENDOCRINOLOGY: No Thyromegaly  PSYCH: sedated    Labs:  ABG - ( 20 Nov 2017 04:00 )  pH: 7.40  /  pCO2: 54    /  pO2: 156   / HCO3: 31    / Base Excess: 8.0   /  SaO2: 99.7                            8.6    11.77 )-----------( 82       ( 20 Nov 2017 04:00 )             26.1                         7.8    8.06  )-----------( 61       ( 19 Nov 2017 07:20 )             24.7                         7.8    8.04  )-----------( 61       ( 19 Nov 2017 04:45 )             23.1                         9.2    14.32 )-----------( 77       ( 18 Nov 2017 03:40 )             29.7                         8.4    12.04 )-----------( 77       ( 17 Nov 2017 03:50 )             25.8     11-20    137  |  97<L>  |  17  ----------------------------<  158<H>  4.8   |  31  |  0.75  11-19    135  |  95<L>  |  16  ----------------------------<  177<H>  3.9   |  30  |  0.62  11-18    136  |  95<L>  |  19  ----------------------------<  157<H>  4.7   |  32<H>  |  0.81  11-17    135  |  94<L>  |  18  ----------------------------<  190<H>  4.5   |  32<H>  |  0.73  11-17    133<L>  |  93<L>  |  13  ----------------------------<  159<H>  4.9   |  32<H>  |  0.66    Ca    8.6      20 Nov 2017 04:00  Ca    8.3<L>      19 Nov 2017 04:45  Phos  2.9     11-20  Mg     1.8     11-20    TPro  5.1<L>  /  Alb  2.6<L>  /  TBili  0.2  /  DBili  x   /  AST  14  /  ALT  11  /  AlkPhos  52  11-19  TPro  5.8<L>  /  Alb  2.8<L>  /  TBili  0.2  /  DBili  x   /  AST  9   /  ALT  9   /  AlkPhos  57  11-17    CAPILLARY BLOOD GLUCOSE      POCT Blood Glucose.: 180 mg/dL (20 Nov 2017 13:34)  POCT Blood Glucose.: 123 mg/dL (20 Nov 2017 06:35)  POCT Blood Glucose.: 149 mg/dL (19 Nov 2017 23:52)  POCT Blood Glucose.: 140 mg/dL (19 Nov 2017 18:04)      LIVER FUNCTIONS - ( 19 Nov 2017 04:45 )  Alb: 2.6 g/dL / Pro: 5.1 g/dL / ALK PHOS: 52 u/L / ALT: 11 u/L / AST: 14 u/L / GGT: x               Cultures:           Wound culture:                11-15 @ 16:22  Organism --  Culture w/ gram stain --  Specimen Source BRONCHIAL LAVAGE    Wound culture:                11-15 @ 09:22  Organism --  Culture w/ gram stain --  Specimen Source BLOOD    Wound culture:                11-14 @ 00:44  Organism --  Culture w/ gram stain --  Specimen Source SPUTUM      Abscess culture:             11-15 @ 16:22  Organism --  Gram Stain --  Specimen Source BRONCHIAL LAVAGE    Abscess culture:             11-15 @ 09:22  Organism --  Gram Stain --  Specimen Source BLOOD    Abscess culture:             11-14 @ 00:44  Organism --  Gram Stain --  Specimen Source SPUTUM        Tissue culture:           11-15 @ 16:22  Organism --  Gram Stain --  Specimen Source BRONCHIAL LAVAGE    Tissue culture:           11-15 @ 09:22  Organism --  Gram Stain --  Specimen Source BLOOD    Tissue culture:           11-14 @ 00:44  Organism --  Gram Stain --  Specimen Source SPUTUM      Body Fluid Smear & Culture:                        11-15 @ 16:22  AFB Smear  --  Culture Acid Fast Body Fluid w/ Smear  --  Culture Acid Fast Smear Concentrated     AFB SMEAR= NO ACID FAST BACILLI SEEN    Culture Results:     --  Specimen Source BRONCHIAL LAVAGE    Body Fluid Smear & Culture:                        11-15 @ 09:22  AFB Smear  --  Culture Acid Fast Body Fluid w/ Smear  --  Culture Acid Fast Smear Concentrated   --    Culture Results:     --  Specimen Source BLOOD    Body Fluid Smear & Culture:                        11-14 @ 00:44  AFB Smear  --  Culture Acid Fast Body Fluid w/ Smear  --  Culture Acid Fast Smear Concentrated   --    Culture Results:     --  Specimen Source SPUTUM          < from: Xray Chest 1 View AP -PORTABLE-Routine (11.20.17 @ 07:00) >  CLINICAL INFORMATION: Respiratory failure    TECHNIQUE:   Portable chest    INTERPRETATION:     Tubes and lines unchanged. Chronic changes in both lungs with patient   history of sarcoidosis unchanged with loculated pneumothorax suspected on   the left side and more cystic changes bilaterally with linear areas of   fibrosis or wall is of bullae on the right side.      COMPARISON:  November 19      IMPRESSION:  Follow-up showing no change.                  RANDALL AMADOR M.D.; ATTENDING RADIOLOGIST    < end of copied text >      Studies  Chest X-RAY  CT SCAN Chest   Venous Dopplers: LE:   CT Abdomen  Others

## 2017-11-20 NOTE — PROCEDURE NOTE - GENERAL PROCEDURE DETAILS
Frexible bronchoscopy with bronchoalveolar lavage to clean out old clot.  No evidence of any active bleeding.
flexible bronchoscopy and BAL with iced saline and epinephrine
flexible bronchoscopy and BAL to clear out old blood prior to extubation

## 2017-11-20 NOTE — PROGRESS NOTE ADULT - PROBLEM SELECTOR PLAN 2
? 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 ot from bronchiectasis? eliquis have been dced: needs card to se: In addition, he may need IR help with embolization, it his hemoptysis increases: He has had pleurodesis on the left side.  11/10 quantify hemoptysis. no AC. stable  11/11 no more hemoptysis. off AC  11/12 resolved. off AC  11/13: today had hemoptysis again----->localize site---> IR embolization: left a message for thoracic team  11/14: DW Dr Caldera: for repeat bronchoscopy to localize the site of bleeding and need IR help to embolize the culprit vessel!  11/15: started on broad spectrum antibiotics and for bronchoscopy today  11/16: s/p IR emobolization done: monitor for more hemoptysis  11/17: s/p bronchsocopy: reportedly old blood seen in airways!  11/18 s/p bronchoscopy in ICU via ETT. old clot. no active bleeding  11/19 stable. no active bleeding per recent bronchoscopy  11/20; off AC at this time

## 2017-11-20 NOTE — PROCEDURE NOTE - NSPOSTCAREGUIDE_GEN_A_CORE
Care for catheter as per unit/ICU protocols
continue suctioning prn, pt may require repeat bronchoscopy
Verbal/written post procedure instructions were given to patient/caregiver

## 2017-11-20 NOTE — PROGRESS NOTE ADULT - PROBLEM SELECTOR PLAN 4
on prednisone.  11/10 continue steroid  11/11 on steroid  11/12 on oral steroid  11/13: cont oral steroids!!  11/14: Has pretty poor lungs secondary to steroids: Cont current therapy!!  11/16: on high dose steroids for now , would need to taper down the steroids to 10 or 20 mg of prednisone chronically:  11/17 steroid  11/18 on Steroid  11/20:n 40 mg  of solumedrol for now: considering decreasing to 30 mg in a day or tow

## 2017-11-20 NOTE — CHART NOTE - NSCHARTNOTEFT_GEN_A_CORE
Source:  nursing & EMR    Diet : NPO with tube feed - Jevity 1.2 @ 65 ml/hr 24hrs     Patient intubated , sedated, tolerating EN @ GOAL RATE ordered , noted 3+ generalized edema and R buttock abrasion present per flow sheet . Noted wt. increase from admission as of 11/16/17 may be reflective of edema , need current wt. and daily wt to assess wt. status . Pt. will benefit from increased EN and discussed with MD .      Enteral: EN provides 1872 kcal & 86.5 gm protein/d       Current Weight: -  no recent wt.     Pertinent Medications: MEDICATIONS  (STANDING):    atorvastatin 80 milliGRAM(s) Oral at bedtime  buDESOnide 160 MICROgram(s)/formoterol 4.5 MICROgram(s) Inhaler 2 Puff(s) Inhalation two times a day  cisatracurium Infusion 2 MICROgram(s)/kG/Min (9 mL/Hr) IV Continuous <Continuous>  dexmedetomidine Infusion 0.3 MICROgram(s)/kG/Hr (5.625 mL/Hr) IV Continuous <Continuous>  fentaNYL   Infusion 1 MICROgram(s)/kG/Hr (7.5 mL/Hr) IV Continuous <Continuous>  ferrous    sulfate 325 milliGRAM(s) Oral three times a day with meals  folic acid 1 milliGRAM(s) Oral daily  insulin lispro (HumaLOG) corrective regimen sliding scale   SubCutaneous every 6 hours  ipratropium 17 MICROgram(s) HFA Inhaler 1 Puff(s) Inhalation every 6 hours  methylPREDNISolone sodium succinate Injectable 20 milliGRAM(s) IV Push every 12 hours  midazolam Infusion 5 mG/Hr (5 mL/Hr) IV Continuous <Continuous>  pantoprazole  Injectable 40 milliGRAM(s) IV Push daily  petrolatum Ophthalmic Ointment 1 Application(s) Both EYES two times a day  phenylephrine    Infusion 0.1 MICROgram(s)/kG/Min (2.813 mL/Hr) IV Continuous <Continuous>  polyethylene glycol 3350 17 Gram(s) Oral at bedtime  propofol Infusion 30 MICROgram(s)/kG/Min (13.5 mL/Hr) IV Continuous <Continuous>  senna Syrup 5 milliLiter(s) Oral three times a day  valproic  acid Syrup 500 milliGRAM(s) Oral two times a day  voriconazole IVPB 300 milliGRAM(s) IV Intermittent every 12 hours    MEDICATIONS  (PRN):  acetaminophen  IVPB. 1000 milliGRAM(s) IV Intermittent once PRN Moderate Pain (4 - 6)  ALBUTerol    90 MICROgram(s) HFA Inhaler 2 Puff(s) Inhalation every 6 hours PRN Shortness of Breath and/or Wheezing  dextrose Gel 1 Dose(s) Oral once PRN Blood Glucose LESS THAN 70 milliGRAM(s)/deciliter  glucagon  Injectable 1 milliGRAM(s) IntraMuscular once PRN Glucose LESS THAN 70 milligrams/deciliter    Pertinent Labs:  11-20 Na137 mmol/L Glu 158 mg/dL<H> K+ 4.8 mmol/L Cr  0.75 mg/dL BUN 17 mg/dL Phos 2.9 mg/dL Alb n/a   PAB n/a         Estimated Needs:  1878 - 2250 kcal/d ( 25 - 30 kcal/kg dry wt. ) protein 90 - 105 gm/d ( 1.2 - 1.4 gm/kg dry wt. )       Previous Nutrition Diagnosis: -  Malnutrition      Nutrition Diagnosis is -  ongoing     Recommend to increase EN - Jevity 1.2 @ 70 ml/hr 24hrs = 2016 kcal & 93 gm protein/d ; Request wt. two times a week      Monitoring and Evaluation:     1. Tolerance to diet prescription   2. weights   3. follow up per protocol

## 2017-11-21 LAB
ALBUMIN SERPL ELPH-MCNC: 3 G/DL — LOW (ref 3.3–5)
ALP SERPL-CCNC: 58 U/L — SIGNIFICANT CHANGE UP (ref 40–120)
ALT FLD-CCNC: 9 U/L — SIGNIFICANT CHANGE UP (ref 4–41)
AST SERPL-CCNC: 9 U/L — SIGNIFICANT CHANGE UP (ref 4–40)
BASE EXCESS BLDA CALC-SCNC: 11.2 MMOL/L — SIGNIFICANT CHANGE UP
BASOPHILS # BLD AUTO: 0 K/UL — SIGNIFICANT CHANGE UP (ref 0–0.2)
BASOPHILS NFR BLD AUTO: 0 % — SIGNIFICANT CHANGE UP (ref 0–2)
BILIRUB SERPL-MCNC: < 0.2 MG/DL — LOW (ref 0.2–1.2)
BLD GP AB SCN SERPL QL: NEGATIVE — SIGNIFICANT CHANGE UP
BUN SERPL-MCNC: 18 MG/DL — SIGNIFICANT CHANGE UP (ref 7–23)
CALCIUM SERPL-MCNC: 8.5 MG/DL — SIGNIFICANT CHANGE UP (ref 8.4–10.5)
CHLORIDE BLDA-SCNC: 98 MMOL/L — SIGNIFICANT CHANGE UP (ref 96–108)
CHLORIDE SERPL-SCNC: 96 MMOL/L — LOW (ref 98–107)
CO2 SERPL-SCNC: 35 MMOL/L — HIGH (ref 22–31)
CREAT SERPL-MCNC: 0.7 MG/DL — SIGNIFICANT CHANGE UP (ref 0.5–1.3)
EOSINOPHIL # BLD AUTO: 0 K/UL — SIGNIFICANT CHANGE UP (ref 0–0.5)
EOSINOPHIL NFR BLD AUTO: 0 % — SIGNIFICANT CHANGE UP (ref 0–6)
GLUCOSE BLDA-MCNC: 143 MG/DL — HIGH (ref 70–99)
GLUCOSE SERPL-MCNC: 145 MG/DL — HIGH (ref 70–99)
HCO3 BLDA-SCNC: 34 MMOL/L — HIGH (ref 22–26)
HCT VFR BLD CALC: 25 % — LOW (ref 39–50)
HCT VFR BLDA CALC: 25.9 % — LOW (ref 39–51)
HGB BLD-MCNC: 8.1 G/DL — LOW (ref 13–17)
HGB BLDA-MCNC: 8.3 G/DL — LOW (ref 13–17)
IMM GRANULOCYTES # BLD AUTO: 0.05 # — SIGNIFICANT CHANGE UP
IMM GRANULOCYTES NFR BLD AUTO: 0.5 % — SIGNIFICANT CHANGE UP (ref 0–1.5)
LACTATE BLDA-SCNC: 1.4 MMOL/L — SIGNIFICANT CHANGE UP (ref 0.5–2)
LYMPHOCYTES # BLD AUTO: 0.82 K/UL — LOW (ref 1–3.3)
LYMPHOCYTES # BLD AUTO: 8.8 % — LOW (ref 13–44)
MCHC RBC-ENTMCNC: 31.8 PG — SIGNIFICANT CHANGE UP (ref 27–34)
MCHC RBC-ENTMCNC: 32.4 % — SIGNIFICANT CHANGE UP (ref 32–36)
MCV RBC AUTO: 98 FL — SIGNIFICANT CHANGE UP (ref 80–100)
MONOCYTES # BLD AUTO: 0.59 K/UL — SIGNIFICANT CHANGE UP (ref 0–0.9)
MONOCYTES NFR BLD AUTO: 6.3 % — SIGNIFICANT CHANGE UP (ref 2–14)
NEUTROPHILS # BLD AUTO: 7.84 K/UL — HIGH (ref 1.8–7.4)
NEUTROPHILS NFR BLD AUTO: 84.4 % — HIGH (ref 43–77)
NRBC # FLD: 0 — SIGNIFICANT CHANGE UP
PCO2 BLDA: 62 MMHG — HIGH (ref 35–48)
PH BLDA: 7.39 PH — SIGNIFICANT CHANGE UP (ref 7.35–7.45)
PLATELET # BLD AUTO: 57 K/UL — LOW (ref 150–400)
PMV BLD: 12.7 FL — SIGNIFICANT CHANGE UP (ref 7–13)
PO2 BLDA: 173 MMHG — HIGH (ref 83–108)
POTASSIUM BLDA-SCNC: 4.1 MMOL/L — SIGNIFICANT CHANGE UP (ref 3.4–4.5)
POTASSIUM SERPL-MCNC: 4.6 MMOL/L — SIGNIFICANT CHANGE UP (ref 3.5–5.3)
POTASSIUM SERPL-SCNC: 4.6 MMOL/L — SIGNIFICANT CHANGE UP (ref 3.5–5.3)
PROT SERPL-MCNC: 5.7 G/DL — LOW (ref 6–8.3)
RBC # BLD: 2.55 M/UL — LOW (ref 4.2–5.8)
RBC # FLD: 14.6 % — HIGH (ref 10.3–14.5)
RH IG SCN BLD-IMP: POSITIVE — SIGNIFICANT CHANGE UP
SAO2 % BLDA: 99.8 % — HIGH (ref 95–99)
SODIUM BLDA-SCNC: 133 MMOL/L — LOW (ref 136–146)
SODIUM SERPL-SCNC: 139 MMOL/L — SIGNIFICANT CHANGE UP (ref 135–145)
SRA INTERP SER-IMP: SIGNIFICANT CHANGE UP
WBC # BLD: 9.3 K/UL — SIGNIFICANT CHANGE UP (ref 3.8–10.5)
WBC # FLD AUTO: 9.3 K/UL — SIGNIFICANT CHANGE UP (ref 3.8–10.5)

## 2017-11-21 PROCEDURE — 71010: CPT | Mod: 26

## 2017-11-21 PROCEDURE — 99232 SBSQ HOSP IP/OBS MODERATE 35: CPT

## 2017-11-21 PROCEDURE — 99291 CRITICAL CARE FIRST HOUR: CPT

## 2017-11-21 PROCEDURE — 74000: CPT | Mod: 26

## 2017-11-21 RX ORDER — DEXMEDETOMIDINE HYDROCHLORIDE IN 0.9% SODIUM CHLORIDE 4 UG/ML
0.7 INJECTION INTRAVENOUS
Qty: 200 | Refills: 0 | Status: DISCONTINUED | OUTPATIENT
Start: 2017-11-21 | End: 2017-11-21

## 2017-11-21 RX ORDER — FUROSEMIDE 40 MG
20 TABLET ORAL ONCE
Qty: 0 | Refills: 0 | Status: COMPLETED | OUTPATIENT
Start: 2017-11-21 | End: 2017-11-21

## 2017-11-21 RX ORDER — MIDAZOLAM HYDROCHLORIDE 1 MG/ML
2 INJECTION, SOLUTION INTRAMUSCULAR; INTRAVENOUS ONCE
Qty: 0 | Refills: 0 | Status: DISCONTINUED | OUTPATIENT
Start: 2017-11-21 | End: 2017-11-21

## 2017-11-21 RX ADMIN — Medication 1 APPLICATION(S): at 05:32

## 2017-11-21 RX ADMIN — BUDESONIDE AND FORMOTEROL FUMARATE DIHYDRATE 2 PUFF(S): 160; 4.5 AEROSOL RESPIRATORY (INHALATION) at 09:29

## 2017-11-21 RX ADMIN — CHLORHEXIDINE GLUCONATE 15 MILLILITER(S): 213 SOLUTION TOPICAL at 05:41

## 2017-11-21 RX ADMIN — VORICONAZOLE 125 MILLIGRAM(S): 10 INJECTION, POWDER, LYOPHILIZED, FOR SOLUTION INTRAVENOUS at 23:12

## 2017-11-21 RX ADMIN — FENTANYL CITRATE 7.5 MICROGRAM(S)/KG/HR: 50 INJECTION INTRAVENOUS at 18:30

## 2017-11-21 RX ADMIN — Medication 1 PUFF(S): at 23:00

## 2017-11-21 RX ADMIN — FENTANYL CITRATE 7.5 MICROGRAM(S)/KG/HR: 50 INJECTION INTRAVENOUS at 17:37

## 2017-11-21 RX ADMIN — Medication 1: at 00:50

## 2017-11-21 RX ADMIN — Medication 1: at 17:37

## 2017-11-21 RX ADMIN — ATORVASTATIN CALCIUM 80 MILLIGRAM(S): 80 TABLET, FILM COATED ORAL at 22:14

## 2017-11-21 RX ADMIN — PROPOFOL 13.5 MICROGRAM(S)/KG/MIN: 10 INJECTION, EMULSION INTRAVENOUS at 22:13

## 2017-11-21 RX ADMIN — FENTANYL CITRATE 7.5 MICROGRAM(S)/KG/HR: 50 INJECTION INTRAVENOUS at 22:13

## 2017-11-21 RX ADMIN — MIDAZOLAM HYDROCHLORIDE 2 MILLIGRAM(S): 1 INJECTION, SOLUTION INTRAMUSCULAR; INTRAVENOUS at 06:13

## 2017-11-21 RX ADMIN — SENNA PLUS 5 MILLILITER(S): 8.6 TABLET ORAL at 22:14

## 2017-11-21 RX ADMIN — PROPOFOL 13.5 MICROGRAM(S)/KG/MIN: 10 INJECTION, EMULSION INTRAVENOUS at 17:37

## 2017-11-21 RX ADMIN — Medication 325 MILLIGRAM(S): at 08:04

## 2017-11-21 RX ADMIN — Medication 20 MILLIGRAM(S): at 18:31

## 2017-11-21 RX ADMIN — PANTOPRAZOLE SODIUM 40 MILLIGRAM(S): 20 TABLET, DELAYED RELEASE ORAL at 11:28

## 2017-11-21 RX ADMIN — BUDESONIDE AND FORMOTEROL FUMARATE DIHYDRATE 2 PUFF(S): 160; 4.5 AEROSOL RESPIRATORY (INHALATION) at 23:00

## 2017-11-21 RX ADMIN — FENTANYL CITRATE 1 MICROGRAM(S)/KG/HR: 50 INJECTION INTRAVENOUS at 17:39

## 2017-11-21 RX ADMIN — SENNA PLUS 5 MILLILITER(S): 8.6 TABLET ORAL at 05:32

## 2017-11-21 RX ADMIN — FENTANYL CITRATE 1 MICROGRAM(S)/KG/HR: 50 INJECTION INTRAVENOUS at 18:31

## 2017-11-21 RX ADMIN — DEXMEDETOMIDINE HYDROCHLORIDE IN 0.9% SODIUM CHLORIDE 13.12 MICROGRAM(S)/KG/HR: 4 INJECTION INTRAVENOUS at 11:28

## 2017-11-21 RX ADMIN — FENTANYL CITRATE 1 MICROGRAM(S)/KG/HR: 50 INJECTION INTRAVENOUS at 08:15

## 2017-11-21 RX ADMIN — Medication 325 MILLIGRAM(S): at 17:38

## 2017-11-21 RX ADMIN — Medication 20 MILLIGRAM(S): at 06:13

## 2017-11-21 RX ADMIN — Medication 1 APPLICATION(S): at 17:38

## 2017-11-21 RX ADMIN — Medication 500 MILLIGRAM(S): at 17:39

## 2017-11-21 RX ADMIN — Medication 1 PUFF(S): at 15:55

## 2017-11-21 RX ADMIN — Medication 1 MILLIGRAM(S): at 11:27

## 2017-11-21 RX ADMIN — CHLORHEXIDINE GLUCONATE 15 MILLILITER(S): 213 SOLUTION TOPICAL at 17:37

## 2017-11-21 RX ADMIN — Medication 1: at 23:20

## 2017-11-21 RX ADMIN — Medication 1 PUFF(S): at 09:31

## 2017-11-21 RX ADMIN — CHLORHEXIDINE GLUCONATE 1 APPLICATION(S): 213 SOLUTION TOPICAL at 05:33

## 2017-11-21 RX ADMIN — ALBUTEROL 2 PUFF(S): 90 AEROSOL, METERED ORAL at 21:15

## 2017-11-21 RX ADMIN — VORICONAZOLE 125 MILLIGRAM(S): 10 INJECTION, POWDER, LYOPHILIZED, FOR SOLUTION INTRAVENOUS at 11:27

## 2017-11-21 RX ADMIN — SENNA PLUS 5 MILLILITER(S): 8.6 TABLET ORAL at 13:07

## 2017-11-21 RX ADMIN — Medication 500 MILLIGRAM(S): at 05:56

## 2017-11-21 RX ADMIN — Medication 20 MILLIGRAM(S): at 08:04

## 2017-11-21 RX ADMIN — PROPOFOL 13.5 MICROGRAM(S)/KG/MIN: 10 INJECTION, EMULSION INTRAVENOUS at 18:30

## 2017-11-21 RX ADMIN — Medication 1 PUFF(S): at 03:56

## 2017-11-21 RX ADMIN — FENTANYL CITRATE 7.5 MICROGRAM(S)/KG/HR: 50 INJECTION INTRAVENOUS at 08:00

## 2017-11-21 RX ADMIN — Medication 325 MILLIGRAM(S): at 11:35

## 2017-11-21 RX ADMIN — POLYETHYLENE GLYCOL 3350 17 GRAM(S): 17 POWDER, FOR SOLUTION ORAL at 22:14

## 2017-11-21 RX ADMIN — PROPOFOL 13.5 MICROGRAM(S)/KG/MIN: 10 INJECTION, EMULSION INTRAVENOUS at 08:00

## 2017-11-21 NOTE — PROGRESS NOTE ADULT - PROBLEM SELECTOR PLAN 5
controlled.  11/10 stable  11/11 stable  11/12 stable  11/18 off meds. not on pressor  11/19 stable without pressors  11/21: controlled

## 2017-11-21 NOTE — CHART NOTE - NSCHARTNOTEFT_GEN_A_CORE
MICU Transfer Note    Transfer from: CT-ICU    Transfer to: (  ) Medicine    (  ) Telemetry     (   ) RCU        (    ) Palliative         (   ) Stroke Unit          ( x  ) MICU    Accepting Physician: Dr. Medina    Hospital  COURSE:  47 year old male with a PMHx of sarcoidosis, HTN, asthma, and COPD presents to the ED (9/11/17) with severe leg spasms and headache leading to a presyncopal episode, found to have a significant abnormal EKG and moderate inspiratory and expiratory wheezing in the setting of Entero/Rhino virus, admitted to tele for Near Syncope, r/o ACS and COPD exacerbation.  Hospital course complicated by stroke and PAF- AC on hold due to hemoptysis; 9/21-FB/BAL done by Dr. Arias. Intraop: Bleeding from DAVID (non-lingular)-Recom IR to embolize; 9/22 CT angio done per IR Lobko request.  Formal PFTS and ask Card to fix PFO now to decrease blood flow; 9/24- Developed worsening hemoptysis. Emergently kristel to OR and had  Left thoractomy DAVID. Brought to CTICU.  9/24: 1400ml EBL in OR; 10 u PRBCs;9/25-Brought back to OR for bleeding. Had Reop Thoracotomy, evac left hemothorax. CTI- DAVID space despite -40 Sxn, MEYER, L Hemiparesis (CVA) better w walking; Patient post op with prolongd air leak shila ptx, bedside pleurodesis done.  Developed SIRS after pleurodesis-> CTICU; On pressors. Pt. clinically improved in CTICU. Maintained on Steroids, weaned down to Solumedrol BID. Off pressors and transitioned to Midodrine. Still with thrombocytopenia but no bleeding. Continued on Eliquis but no ASA for h/o CVA. Neuro deficits improving.  Air leak resolved on 10/27. 10/30 Chest tube removed, cxr stable.  Steriods to po taper.  Patient stable for discharge.  11/2-Pt ambulating w PT frequently. Feels well. Pain controlled. All labs and vital signs stable. Wound healing. Arnold po steroid taper. To complete Voriconazole on Nov 4th. CXRs and reports reviewed by and cleared by Dr. Caldera. Pt. to fu for PFO closure as output. Still requires intermittent oxygen. Cleared for discharge by Dr. Caldera to acute rehab.    Patient presented to [place] [facility] after rehab send patient with hemoptysis, patient had 2 episodes of hemoptysis, less than 10cc in total for 24 hours.  Patient states it happens when he coughs, blood tinged sputum.  Patient otherwise doing well in rehab. (09 Nov 2017 00:32)            ASSESSMENT & PLAN:             FOR FOLLOW UP: MICU Transfer Note    Transfer from: CT-ICU    Transfer to: (  ) Medicine    (  ) Telemetry     (   ) RCU        (    ) Palliative         (   ) Stroke Unit          ( x  ) MICU    Accepting Physician: Dr. Medina    Hospital  COURSE:  47 year old male with a PMHx of sarcoidosis, HTN, asthma, and COPD admitted 9/11/17 with severe leg spasms and headache leading to a presyncopal episode, found to have a significant abnormal EKG and moderate inspiratory and expiratory wheezing in the setting of Entero/Rhino virus. Patient was admitted to [facility] for Near Syncope, r/o ACS and COPD exacerbation.  Hospital course complicated by RRT/Stroke code. Found to have stroke and PAF. Patient initially started on Eliquis and then developed Hemoptysis. AC then held. 9/21-FB/BAL done by Dr. Arias. Intraop: found to have bleeding from DAVID (non-lingular)-s/p IR embolization in 2 areas. 9/22 CT angio done per IR Lobko request.  Formal PFTS and ask Card to fix PFO now to decrease blood flow; 9/24- Developed worsening hemoptysis. Emergently brought to OR and had  Left thoractomy DAVID. Brought to CTICU.  9/24: 1400ml EBL in OR; 10 u PRBCs;9/25-Brought back to OR for bleeding. Had Reop Thoracotomy, evac left hemothorax. CTI- DAVID space despite -40 Sxn, MEYER, L Hemiparesis (CVA) better w walking; Patient post op with prolongd air leak shila ptx, bedside pleurodesis done.  Developed SIRS after pleurodesis-> CTICU; On pressors. Pt. clinically improved in CTICU. Maintained on Steroids, weaned down to Solumedrol BID. Off pressors and transitioned to Midodrine. Still with thrombocytopenia but no bleeding. Continued on Eliquis but no ASA for h/o CVA. Neuro deficits improving.  Air leak resolved on 10/27. 10/30 Chest tube removed, cxr stable.  Steriods to po taper.  Patient stable for discharge.  11/2-Pt ambulating w PT frequently. Feels well. Pain controlled. All labs and vital signs stable. Wound healing. Arnold po steroid taper. To complete Voriconazole on Nov 4th. CXRs and reports reviewed by and cleared by Dr. Caldera. Pt. to fu for PFO closure as output. Still requires intermittent oxygen. Cleared for discharge by Dr. Caldera to acute rehab.    Patient presented to [place] [facility] after rehab send patient with hemoptysis, patient had 2 episodes of hemoptysis, less than 10cc in total for 24 hours.  Patient states it happens when he coughs, blood tinged sputum.  Patient otherwise doing well in rehab. (09 Nov 2017 00:32)            ASSESSMENT & PLAN:             FOR FOLLOW UP: MICU Transfer Note    Transfer from: CT-ICU    Transfer to: (  ) Medicine    (  ) Telemetry     (   ) RCU        (    ) Palliative         (   ) Stroke Unit          ( x  ) MICU    Accepting Physician: Dr. Medina    [facility]  COURSE:    47 M PMH of sarcoidosis (Dx 2006 @ [facility]), HTN, COPD, and asthma who was initially admitted on 09/10 w cc of severe leg spasms for two days. On 9/12 at 4 am, a RRT and Stroke codes were called as the patient became unresponsive.  He was transferred to MICU and then intubated for airway protection. The patient was found to have a new infarct on MRI brain and also found to have a patent PFO on this admission as well. He was weaned off vent the following day, on 09/13 and transferred to the floors. There was concern patient was having paroxysmal afib, so he was started on Eliquis and received 2 doses of Eliquis on 09/19.  However, on 09/20, the patient had a cough productive of fresh blood, (about 1/2 cup). Eliquis was held. He underwent Bronchoscopy with bilateral bronchoalveolar lavage and epinephrine injection on 09/21.  Patient developed hemoptysis again and underwent urgent Left Upper lobectomy with VATS on 9/24/17. 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 Procedure was complicated with post op bleeding and patient was taken back to the OR on 9/25/17 for evacuation of hemothorax and identified bleeding of the posterior intercostal arteries which were cauterized. Patient continued to have DAVID air space with prolonged air leak despite pigtail cath placed to suction, MEYER and Left Hemiparesis 2/2 CVA. He began clinically improving in CTICU. Bedside talc pleurodesis preformed 10/23/17. Patient then developed SIRS requiring pressors for hypotension. Eliquis restarted and neuro deficits began improving. Air leak resolved 10/27 and pigtail cath removed 10/30. Steroids were tapered and pressors transitioned to midodrine. Patient was ambulating and deemed stable for discharge on 11/2. Patient dc'd to Acute Rehab. Voriconazole to be completed 11/4 and outpt follow up with Cardiology recommended to close PFO.     Patient returned this admission 11/9 for recurrent hemoptysis. Initially seen at [facility] and transferred for 2 episodes of hemoptysis. Patient was intubated and admitted to CTICU. DAVID PTX persists. Patient had BAL in OR 11/15, then later had rpt BAL in CTICU for persistent hemoptysis via ETT. 11/16 patient had an IR embolization of left bronchial artery, Bronchoscopy with BAL post IR embolization - w/o evidence of active bleeding. Rpt bronch 11/17 and 11/20 in CTICU via ETT; No evidence of active bleeding, only old clots. Vent weaning trials to CPAP daily w/o success despite Precedex. Patient transferred to MICU for vent weaning.       PAST MEDICAL & SURGICAL HISTORY:  History of pneumothorax: History of 3 prior pneumothoracies.  COPD (chronic obstructive pulmonary disease)  Asthma  Hypertension  Sarcoidosis  History of thoracotomy: 9/24/17 Left thoracotomy, Left upper lobectomy   9/25/17 Reop left thoracotomy, evacuation of left hemothorax      FAMILY HISTORY:  Family history of pancreatic cancer  Family history of essential hypertension (Father)      SOCIAL HISTORY:  Smoking: [ x ] Never Smoked [ ] Former Smoker (__ packs x ___ years) [ ] Current Smoker  (__ packs x ___ years)  Substance Use: [ x ] Never Used [ ] Used ____  EtOH Use: none  Marital Status: [ x ] Single [ ]  [ ]  [ ]   Occupation: [employer] worker, clean up crew  Recent Travel: none  Country of Birth: [place]  Advance Directives: FULL CODE    Allergies: No Known Allergies    I  HOME MEDICATIONS:  Home Medications:  acetaminophen 325 mg oral tablet: 2 tab(s) orally every 6 hours, As needed, Mild Pain (1 - 3) (02 Nov 2017 11:46)  apixaban 5 mg oral tablet: 1 tab(s) orally every 12 hours (02 Nov 2017 11:49)  atorvastatin 80 mg oral tablet: 1 tab(s) orally once a day (at bedtime) (02 Nov 2017 11:49)  budesonide-formoterol 160 mcg-4.5 mcg/inh inhalation aerosol: 2 puff(s) inhaled 2 times a day (02 Nov 2017 11:49)  divalproex sodium 500 mg oral delayed release tablet: 1 tab(s) orally 2 times a day (02 Nov 2017 11:49)  docusate sodium 100 mg oral capsule: 1 cap(s) orally 2 times a day (02 Nov 2017 11:49)  ferrous sulfate 325 mg (65 mg elemental iron) oral tablet: 1  orally 3 times a day (02 Nov 2017 11:49)  folic acid 1 mg oral tablet: 1 tab(s) orally once a day (02 Nov 2017 11:50)  ipratropium 500 mcg/2.5 mL inhalation solution: 2.5 milliliter(s) inhaled every 6 hours (02 Nov 2017 11:49)  levalbuterol 0.63 mg/3 mL inhalation solution: 3 milliliter(s) inhaled every 6 hours (02 Nov 2017 11:49)  lidocaine 5% topical film: Apply topically to affected area once a day (02 Nov 2017 11:49)  oxyCODONE 10 mg oral tablet: 1 tab(s) orally every 4 hours, As needed, Severe Pain (7 - 10) (02 Nov 2017 11:46)  oxyCODONE 5 mg oral tablet: 1 tab(s) orally every 4 hours, As needed, Moderate Pain (4 - 6) (02 Nov 2017 11:46)  pantoprazole 40 mg oral delayed release tablet: 1 tab(s) orally 2 times a day (before meals) (02 Nov 2017 11:50)  polyethylene glycol 3350 oral powder for reconstitution: 17 gram(s) orally once a day (at bedtime) (02 Nov 2017 11:50)  predniSONE: 30 milligram(s) orally once a day until Nov 6th then 20mg daily x 5 days then 10mg daily indefinitely.  (02 Nov 2017 11:46)      REVIEW OF SYSTEMS:  Constitutional: [ ] negative [ ] fevers [ ] chills [ ] weight loss [ ] weight gain  HEENT: [ ] negative [ ] dry eyes [ ] eye irritation [ ] postnasal drip [ ] nasal congestion  CV: [ ] negative  [ ] chest pain [ ] orthopnea [ ] palpitations [ ] murmur  Resp: [ ] negative [ ] cough [ ] shortness of breath [ ] dyspnea [ ] wheezing [ ] sputum [ ] hemoptysis  GI: [ ] negative [ ] nausea [ ] vomiting [ ] diarrhea [ ] constipation [ ] abd pain [ ] dysphagia   : [ ] negative [ ] dysuria [ ] nocturia [ ] hematuria [ ] increased urinary frequency  Musculoskeletal: [ ] negative [ ] back pain [ ] myalgias [ ] arthralgias [ ] fracture  Skin: [ ] negative [ ] rash [ ] itch  Neurological: [ ] negative [ ] headache [ ] dizziness [ ] syncope [ ] weakness [ ] numbness  Psychiatric: [ ] negative [ ] anxiety [ ] depression  Endocrine: [ ] negative [ ] diabetes [ ] thyroid problem  Hematologic/Lymphatic: [ ] negative [ ] anemia [ ] bleeding problem  Allergic/Immunologic: [ ] negative [ ] itchy eyes [ ] nasal discharge [ ] hives [ ] angioedema  [ ] All other systems negative  [x ] Unable to assess ROS because; patient is intubated and sedated      OBJECTIVE:  ICU Vital Signs Last 24 Hrs  T(C): 35.9 (21 Nov 2017 16:00), Max: 37 (20 Nov 2017 20:00)  T(F): 96.7 (21 Nov 2017 16:00), Max: 98.6 (20 Nov 2017 20:00)  HR: 82 (21 Nov 2017 17:15) (68 - 142)  BP: 107/84 (21 Nov 2017 17:15) (94/63 - 164/106)  BP(mean): 88 (21 Nov 2017 17:15) (69 - 117)  ABP: 129/74 (21 Nov 2017 17:15) (85/49 - 190/98)  ABP(mean): 90 (21 Nov 2017 17:15) (60 - 127)  RR: 20 (21 Nov 2017 17:15) (13 - 24)  SpO2: 100% (21 Nov 2017 17:15) (96% - 100%)    Mode: AC/ CMV (Assist Control/ Continuous Mandatory Ventilation), RR (machine): 20, TV (machine): 450, FiO2: 40, PEEP: 5, MAP: 18, PIP: 45    11-20 @ 07:01 - 11-21 @ 07:00  --------------------------------------------------------  IN: 2983.7 mL / OUT: 1805 mL / NET: 1178.7 mL    11-21 @ 07:01 - 11-21 @ 18:02  --------------------------------------------------------  IN: 1447.8 mL / OUT: 1120 mL / NET: 327.8 mL      POCT Blood Glucose.: 178 mg/dL (21 Nov 2017 17:34)      PHYSICAL EXAM:  General:   HEENT:   Lymph Nodes:  Neck:   Respiratory:   Cardiovascular:   Abdomen:   Extremities:   Skin:   Neurological:  Psychiatry:    LINES:  Left IJ (placed 11/18/17). 3 RUE peripheral lines and Right radail A-line. [facility] MEDICATIONS:  Standing Meds:  atorvastatin 80 milliGRAM(s) Oral at bedtime  buDESOnide 160 MICROgram(s)/formoterol 4.5 MICROgram(s) Inhaler 2 Puff(s) Inhalation two times a day  chlorhexidine 0.12% Liquid 15 milliLiter(s) Swish and Spit two times a day  chlorhexidine 4% Liquid 1 Application(s) Topical daily  dexmedetomidine Infusion 0.7 MICROgram(s)/kG/Hr IV Continuous <Continuous>  dextrose 5%. 1000 milliLiter(s) IV Continuous <Continuous>  dextrose 50% Injectable 12.5 Gram(s) IV Push once  dextrose 50% Injectable 25 Gram(s) IV Push once  dextrose 50% Injectable 25 Gram(s) IV Push once  fentaNYL   Infusion 1 MICROgram(s)/kG/Hr IV Continuous <Continuous>  ferrous    sulfate 325 milliGRAM(s) Oral three times a day with meals  folic acid 1 milliGRAM(s) Oral daily  insulin lispro (HumaLOG) corrective regimen sliding scale   SubCutaneous every 6 hours  ipratropium 17 MICROgram(s) HFA Inhaler 1 Puff(s) Inhalation every 6 hours  methylPREDNISolone sodium succinate Injectable 20 milliGRAM(s) IV Push every 12 hours  pantoprazole  Injectable 40 milliGRAM(s) IV Push daily  petrolatum Ophthalmic Ointment 1 Application(s) Both EYES two times a day  phenylephrine    Infusion 0.1 MICROgram(s)/kG/Min IV Continuous <Continuous>  polyethylene glycol 3350 17 Gram(s) Oral at bedtime  propofol Infusion 30 MICROgram(s)/kG/Min IV Continuous <Continuous>  senna Syrup 5 milliLiter(s) Oral three times a day  valproic  acid Syrup 500 milliGRAM(s) Oral two times a day  voriconazole IVPB 300 milliGRAM(s) IV Intermittent every 12 hours      PRN Meds:  acetaminophen  IVPB. 1000 milliGRAM(s) IV Intermittent once PRN  ALBUTerol    90 MICROgram(s) HFA Inhaler 2 Puff(s) Inhalation every 6 hours PRN  dextrose Gel 1 Dose(s) Oral once PRN  glucagon  Injectable 1 milliGRAM(s) IntraMuscular once PRN      LABS:                        8.1    9.30  )-----------( 57       ( 21 Nov 2017 04:00 )             25.0     Hgb Trend: 8.1<--, 8.6<--, 7.8<--, 7.8<--, 9.2<--  11-21    139  |  96<L>  |  18  ----------------------------<  145<H>  4.6   |  35<H>  |  0.70    Ca    8.5      21 Nov 2017 04:00  Phos  2.9     11-20  Mg     1.8     11-20    TPro  5.7<L>  /  Alb  3.0<L>  /  TBili  < 0.2<L>  /  DBili  x   /  AST  9   /  ALT  9   /  AlkPhos  58  11-21    Creatinine Trend: 0.70<--, 0.75<--, 0.62<--, 0.81<--, 0.73<--, 0.66<--      Arterial Blood Gas:  11-21 @ 04:00  7.39/62/173/34/99.8/11.2  ABG lactate: 1.4  Arterial Blood Gas:  11-20 @ 04:00  7.40/54/156/31/99.7/8.0  ABG lactate: --          ASSESSMENT & PLAN:     ID:   Continue voriconazole pending fungal culture.  Fungal cultures can be lower yield- may be best to treat empirically for 6 more weeks. MICU Transfer Note    Transfer from: CT-ICU    Transfer to: (  ) Medicine    (  ) Telemetry     (   ) RCU        (    ) Palliative         (   ) Stroke Unit          ( x  ) MICU    Accepting Physician: Dr. Medina    [facility]  COURSE:    47 M PMH of sarcoidosis (Dx 2006 @ [facility]), HTN, COPD, and asthma who was initially admitted on 09/10 w cc of severe leg spasms for two days. On 9/12 at 4 am, a RRT and Stroke codes were called as the patient became unresponsive.  He was transferred to MICU and then intubated for airway protection. The patient was found to have a new infarct on MRI brain and also found to have a patent PFO on this admission as well. He was weaned off vent the following day, on 09/13 and transferred to the floors. There was concern patient was having paroxysmal afib, so he was started on Eliquis and received 2 doses of Eliquis on 09/19.  However, on 09/20, the patient had a cough productive of fresh blood, (about 1/2 cup). Eliquis was held. He underwent Bronchoscopy with bilateral bronchoalveolar lavage and epinephrine injection on 09/21.  Patient developed hemoptysis again and underwent urgent Left Upper lobectomy with VATS on 9/24/17. 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 Procedure was complicated with post op bleeding and patient was taken back to the OR on 9/25/17 for evacuation of hemothorax and identified bleeding of the posterior intercostal arteries which were cauterized. Patient continued to have DAVID air space with prolonged air leak despite pigtail cath placed to suction, MEYER and Left Hemiparesis 2/2 CVA. He began clinically improving in CTICU. Bedside talc pleurodesis preformed 10/23/17. Patient then developed SIRS requiring pressors for hypotension. Eliquis restarted and neuro deficits began improving. Air leak resolved 10/27 and pigtail cath removed 10/30. Steroids were tapered and pressors transitioned to midodrine. Patient was ambulating and deemed stable for discharge on 11/2. Patient dc'd to Acute Rehab. Voriconazole to be completed 11/4 and outpt follow up with Cardiology recommended to close PFO.     Patient returned this admission 11/9 for recurrent hemoptysis. Initially seen at [facility] and transferred for 2 episodes of hemoptysis. Patient was intubated and admitted to CTICU. DAVID PTX persists. Patient had BAL in OR 11/15, then later had rpt BAL in CTICU for persistent hemoptysis via ETT. 11/16 patient had an IR embolization of left bronchial artery, Bronchoscopy with BAL post IR embolization - w/o evidence of active bleeding. Rpt bronch 11/17 and 11/20 in CTICU via ETT; No evidence of active bleeding, only old clots. Vent weaning trials to CPAP daily w/o success despite Precedex. Patient transferred to MICU for vent weaning.       PAST MEDICAL & SURGICAL HISTORY:  History of pneumothorax: History of 3 prior pneumothoracies.  COPD (chronic obstructive pulmonary disease)  Asthma  Hypertension  Sarcoidosis  History of thoracotomy: 9/24/17 Left thoracotomy, Left upper lobectomy   9/25/17 Reop left thoracotomy, evacuation of left hemothorax      FAMILY HISTORY:  Family history of pancreatic cancer  Family history of essential hypertension (Father)      SOCIAL HISTORY:  Smoking: [ x ] Never Smoked [ ] Former Smoker (__ packs x ___ years) [ ] Current Smoker  (__ packs x ___ years)  Substance Use: [ x ] Never Used [ ] Used ____  EtOH Use: none  Marital Status: [ x ] Single [ ]  [ ]  [ ]   Occupation: [employer] worker, clean up crew  Recent Travel: none  Country of Birth: [place]  Advance Directives: FULL CODE    Allergies: No Known Allergies    I  HOME MEDICATIONS:  Home Medications:  acetaminophen 325 mg oral tablet: 2 tab(s) orally every 6 hours, As needed, Mild Pain (1 - 3) (02 Nov 2017 11:46)  apixaban 5 mg oral tablet: 1 tab(s) orally every 12 hours (02 Nov 2017 11:49)  atorvastatin 80 mg oral tablet: 1 tab(s) orally once a day (at bedtime) (02 Nov 2017 11:49)  budesonide-formoterol 160 mcg-4.5 mcg/inh inhalation aerosol: 2 puff(s) inhaled 2 times a day (02 Nov 2017 11:49)  divalproex sodium 500 mg oral delayed release tablet: 1 tab(s) orally 2 times a day (02 Nov 2017 11:49)  docusate sodium 100 mg oral capsule: 1 cap(s) orally 2 times a day (02 Nov 2017 11:49)  ferrous sulfate 325 mg (65 mg elemental iron) oral tablet: 1  orally 3 times a day (02 Nov 2017 11:49)  folic acid 1 mg oral tablet: 1 tab(s) orally once a day (02 Nov 2017 11:50)  ipratropium 500 mcg/2.5 mL inhalation solution: 2.5 milliliter(s) inhaled every 6 hours (02 Nov 2017 11:49)  levalbuterol 0.63 mg/3 mL inhalation solution: 3 milliliter(s) inhaled every 6 hours (02 Nov 2017 11:49)  lidocaine 5% topical film: Apply topically to affected area once a day (02 Nov 2017 11:49)  oxyCODONE 10 mg oral tablet: 1 tab(s) orally every 4 hours, As needed, Severe Pain (7 - 10) (02 Nov 2017 11:46)  oxyCODONE 5 mg oral tablet: 1 tab(s) orally every 4 hours, As needed, Moderate Pain (4 - 6) (02 Nov 2017 11:46)  pantoprazole 40 mg oral delayed release tablet: 1 tab(s) orally 2 times a day (before meals) (02 Nov 2017 11:50)  polyethylene glycol 3350 oral powder for reconstitution: 17 gram(s) orally once a day (at bedtime) (02 Nov 2017 11:50)  predniSONE: 30 milligram(s) orally once a day until Nov 6th then 20mg daily x 5 days then 10mg daily indefinitely.  (02 Nov 2017 11:46)      REVIEW OF SYSTEMS:  Constitutional: [ ] negative [ ] fevers [ ] chills [ ] weight loss [ ] weight gain  HEENT: [ ] negative [ ] dry eyes [ ] eye irritation [ ] postnasal drip [ ] nasal congestion  CV: [ ] negative  [ ] chest pain [ ] orthopnea [ ] palpitations [ ] murmur  Resp: [ ] negative [ ] cough [ ] shortness of breath [ ] dyspnea [ ] wheezing [ ] sputum [ ] hemoptysis  GI: [ ] negative [ ] nausea [ ] vomiting [ ] diarrhea [ ] constipation [ ] abd pain [ ] dysphagia   : [ ] negative [ ] dysuria [ ] nocturia [ ] hematuria [ ] increased urinary frequency  Musculoskeletal: [ ] negative [ ] back pain [ ] myalgias [ ] arthralgias [ ] fracture  Skin: [ ] negative [ ] rash [ ] itch  Neurological: [ ] negative [ ] headache [ ] dizziness [ ] syncope [ ] weakness [ ] numbness  Psychiatric: [ ] negative [ ] anxiety [ ] depression  Endocrine: [ ] negative [ ] diabetes [ ] thyroid problem  Hematologic/Lymphatic: [ ] negative [ ] anemia [ ] bleeding problem  Allergic/Immunologic: [ ] negative [ ] itchy eyes [ ] nasal discharge [ ] hives [ ] angioedema  [ ] All other systems negative  [x ] Unable to assess ROS because; patient is intubated and sedated      OBJECTIVE:  ICU Vital Signs Last 24 Hrs  T(C): 35.9 (21 Nov 2017 16:00), Max: 37 (20 Nov 2017 20:00)  T(F): 96.7 (21 Nov 2017 16:00), Max: 98.6 (20 Nov 2017 20:00)  HR: 82 (21 Nov 2017 17:15) (68 - 142)  BP: 107/84 (21 Nov 2017 17:15) (94/63 - 164/106)  BP(mean): 88 (21 Nov 2017 17:15) (69 - 117)  ABP: 129/74 (21 Nov 2017 17:15) (85/49 - 190/98)  ABP(mean): 90 (21 Nov 2017 17:15) (60 - 127)  RR: 20 (21 Nov 2017 17:15) (13 - 24)  SpO2: 100% (21 Nov 2017 17:15) (96% - 100%)    Mode: AC/ CMV (Assist Control/ Continuous Mandatory Ventilation), RR (machine): 20, TV (machine): 450, FiO2: 40, PEEP: 5, MAP: 18, PIP: 45    11-20 @ 07:01 - 11-21 @ 07:00  --------------------------------------------------------  IN: 2983.7 mL / OUT: 1805 mL / NET: 1178.7 mL    11-21 @ 07:01 - 11-21 @ 18:02  --------------------------------------------------------  IN: 1447.8 mL / OUT: 1120 mL / NET: 327.8 mL      POCT Blood Glucose.: 178 mg/dL (21 Nov 2017 17:34)      PHYSICAL EXAM:  General: sedated, intubated, on full vent support  Neuro: Moving all extremities to commands. No acute change from baseline exam.	  Respiratory: Air entry is decreased at left apex , has bilateral conducted sounds                                    CVS: Regular rate and rhythm. Normal S1/S2. No murmurs. Distal pulses present and equal.  Gastrointestinal: Soft, mildly distended. Bowel sounds present. TF via OG tube  Genitourinary: Pickens cath in place.  Skin: No rash.  Extremities: Warm, no cyanosis. (+) edema bilaterally.  Palpable pulses      LINES:  Left IJ (placed 11/18/17). 3 RUE peripheral lines and Right radail A-line. [facility] MEDICATIONS:  Standing Meds:  atorvastatin 80 milliGRAM(s) Oral at bedtime  buDESOnide 160 MICROgram(s)/formoterol 4.5 MICROgram(s) Inhaler 2 Puff(s) Inhalation two times a day  chlorhexidine 0.12% Liquid 15 milliLiter(s) Swish and Spit two times a day  chlorhexidine 4% Liquid 1 Application(s) Topical daily  dexmedetomidine Infusion 0.7 MICROgram(s)/kG/Hr IV Continuous <Continuous>  dextrose 5%. 1000 milliLiter(s) IV Continuous <Continuous>  dextrose 50% Injectable 12.5 Gram(s) IV Push once  dextrose 50% Injectable 25 Gram(s) IV Push once  dextrose 50% Injectable 25 Gram(s) IV Push once  fentaNYL   Infusion 1 MICROgram(s)/kG/Hr IV Continuous <Continuous>  ferrous    sulfate 325 milliGRAM(s) Oral three times a day with meals  folic acid 1 milliGRAM(s) Oral daily  insulin lispro (HumaLOG) corrective regimen sliding scale   SubCutaneous every 6 hours  ipratropium 17 MICROgram(s) HFA Inhaler 1 Puff(s) Inhalation every 6 hours  methylPREDNISolone sodium succinate Injectable 20 milliGRAM(s) IV Push every 12 hours  pantoprazole  Injectable 40 milliGRAM(s) IV Push daily  petrolatum Ophthalmic Ointment 1 Application(s) Both EYES two times a day  phenylephrine    Infusion 0.1 MICROgram(s)/kG/Min IV Continuous <Continuous>  polyethylene glycol 3350 17 Gram(s) Oral at bedtime  propofol Infusion 30 MICROgram(s)/kG/Min IV Continuous <Continuous>  senna Syrup 5 milliLiter(s) Oral three times a day  valproic  acid Syrup 500 milliGRAM(s) Oral two times a day  voriconazole IVPB 300 milliGRAM(s) IV Intermittent every 12 hours      PRN Meds:  acetaminophen  IVPB. 1000 milliGRAM(s) IV Intermittent once PRN  ALBUTerol    90 MICROgram(s) HFA Inhaler 2 Puff(s) Inhalation every 6 hours PRN  dextrose Gel 1 Dose(s) Oral once PRN  glucagon  Injectable 1 milliGRAM(s) IntraMuscular once PRN      LABS:                        8.1    9.30  )-----------( 57       ( 21 Nov 2017 04:00 )             25.0     Hgb Trend: 8.1<--, 8.6<--, 7.8<--, 7.8<--, 9.2<--  11-21    139  |  96<L>  |  18  ----------------------------<  145<H>  4.6   |  35<H>  |  0.70    Ca    8.5      21 Nov 2017 04:00  Phos  2.9     11-20  Mg     1.8     11-20    TPro  5.7<L>  /  Alb  3.0<L>  /  TBili  < 0.2<L>  /  DBili  x   /  AST  9   /  ALT  9   /  AlkPhos  58  11-21    Creatinine Trend: 0.70<--, 0.75<--, 0.62<--, 0.81<--, 0.73<--, 0.66<--      Arterial Blood Gas:  11-21 @ 04:00  7.39/62/173/34/99.8/11.2  ABG lactate: 1.4  Arterial Blood Gas:  11-20 @ 04:00  7.40/54/156/31/99.7/8.0  ABG lactate: --          ASSESSMENT & PLAN:     ID:   Continue voriconazole pending fungal culture.  Fungal cultures can be lower yield- may be best to treat empirically for 6 more weeks. MICU Transfer Note    Transfer from: CT-ICU    Transfer to: (  ) Medicine    (  ) Telemetry     (   ) RCU        (    ) Palliative         (   ) Stroke Unit          ( x  ) MICU    Accepting Physician: Dr. Medina    [facility]  COURSE:    47 M PMH of sarcoidosis (Dx 2006 @ [facility]), HTN, COPD, and asthma who was initially admitted on 09/10 w cc of severe leg spasms for two days. On 9/12 at 4 am, a RRT and Stroke codes were called as the patient became unresponsive.  He was transferred to MICU and then intubated for airway protection. The patient was found to have a new infarct on MRI brain and also found to have a patent PFO on this admission as well. He was weaned off vent the following day, on 09/13 and transferred to the floors. There was concern patient was having paroxysmal afib, so he was started on Eliquis and received 2 doses of Eliquis on 09/19.  However, on 09/20, the patient had a cough productive of fresh blood, (about 1/2 cup). Eliquis was held. He underwent Bronchoscopy with bilateral bronchoalveolar lavage and epinephrine injection on 09/21.  Patient developed hemoptysis again and underwent urgent Left Upper lobectomy with VATS on 9/24/17. 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 Procedure was complicated with post op bleeding and patient was taken back to the OR on 9/25/17 for evacuation of hemothorax and identified bleeding of the posterior intercostal arteries which were cauterized. Patient continued to have DAVID air space with prolonged air leak despite pigtail cath placed to suction, MEYER and Left Hemiparesis 2/2 CVA. He began clinically improving in CTICU. Bedside talc pleurodesis preformed 10/23/17. Patient then developed SIRS requiring pressors for hypotension. Eliquis restarted and neuro deficits began improving. Air leak resolved 10/27 and pigtail cath removed 10/30. Steroids were tapered and pressors transitioned to midodrine. Patient was ambulating and deemed stable for discharge on 11/2. Patient dc'd to Acute Rehab. Voriconazole to be completed 11/4 and outpt follow up with Cardiology recommended to close PFO.     Patient returned this admission 11/9 for recurrent hemoptysis. Initially seen at [facility] and transferred for 2 episodes of hemoptysis. Patient was intubated and admitted to CTICU. DAVID PTX persists. Patient had BAL in OR 11/15, then later had rpt BAL in CTICU for persistent hemoptysis via ETT. 11/16 patient had an IR embolization of left bronchial artery, Bronchoscopy with BAL post IR embolization - w/o evidence of active bleeding. Rpt bronch 11/17 and 11/20 in CTICU via ETT; No evidence of active bleeding, only old clots. Vent weaning trials to CPAP daily w/o success despite Precedex. Patient transferred to MICU for vent weaning.       PAST MEDICAL & SURGICAL HISTORY:  History of pneumothorax: History of 3 prior pneumothoracies.  COPD (chronic obstructive pulmonary disease)  Asthma  Hypertension  Sarcoidosis  History of thoracotomy: 9/24/17 Left thoracotomy, Left upper lobectomy   9/25/17 Reop left thoracotomy, evacuation of left hemothorax      FAMILY HISTORY:  Family history of pancreatic cancer  Family history of essential hypertension (Father)      SOCIAL HISTORY:  Smoking: [ x ] Never Smoked [ ] Former Smoker (__ packs x ___ years) [ ] Current Smoker  (__ packs x ___ years)  Substance Use: [ x ] Never Used [ ] Used ____  EtOH Use: none  Marital Status: [ x ] Single [ ]  [ ]  [ ]   Occupation: [employer] worker, clean up crew  Recent Travel: none  Country of Birth: [place]  Advance Directives: FULL CODE    Allergies: No Known Allergies    I  HOME MEDICATIONS:  Home Medications:  acetaminophen 325 mg oral tablet: 2 tab(s) orally every 6 hours, As needed, Mild Pain (1 - 3) (02 Nov 2017 11:46)  apixaban 5 mg oral tablet: 1 tab(s) orally every 12 hours (02 Nov 2017 11:49)  atorvastatin 80 mg oral tablet: 1 tab(s) orally once a day (at bedtime) (02 Nov 2017 11:49)  budesonide-formoterol 160 mcg-4.5 mcg/inh inhalation aerosol: 2 puff(s) inhaled 2 times a day (02 Nov 2017 11:49)  divalproex sodium 500 mg oral delayed release tablet: 1 tab(s) orally 2 times a day (02 Nov 2017 11:49)  docusate sodium 100 mg oral capsule: 1 cap(s) orally 2 times a day (02 Nov 2017 11:49)  ferrous sulfate 325 mg (65 mg elemental iron) oral tablet: 1  orally 3 times a day (02 Nov 2017 11:49)  folic acid 1 mg oral tablet: 1 tab(s) orally once a day (02 Nov 2017 11:50)  ipratropium 500 mcg/2.5 mL inhalation solution: 2.5 milliliter(s) inhaled every 6 hours (02 Nov 2017 11:49)  levalbuterol 0.63 mg/3 mL inhalation solution: 3 milliliter(s) inhaled every 6 hours (02 Nov 2017 11:49)  lidocaine 5% topical film: Apply topically to affected area once a day (02 Nov 2017 11:49)  oxyCODONE 10 mg oral tablet: 1 tab(s) orally every 4 hours, As needed, Severe Pain (7 - 10) (02 Nov 2017 11:46)  oxyCODONE 5 mg oral tablet: 1 tab(s) orally every 4 hours, As needed, Moderate Pain (4 - 6) (02 Nov 2017 11:46)  pantoprazole 40 mg oral delayed release tablet: 1 tab(s) orally 2 times a day (before meals) (02 Nov 2017 11:50)  polyethylene glycol 3350 oral powder for reconstitution: 17 gram(s) orally once a day (at bedtime) (02 Nov 2017 11:50)  predniSONE: 30 milligram(s) orally once a day until Nov 6th then 20mg daily x 5 days then 10mg daily indefinitely.  (02 Nov 2017 11:46)      REVIEW OF SYSTEMS:  Constitutional: [ ] negative [ ] fevers [ ] chills [ ] weight loss [ ] weight gain  HEENT: [ ] negative [ ] dry eyes [ ] eye irritation [ ] postnasal drip [ ] nasal congestion  CV: [ ] negative  [ ] chest pain [ ] orthopnea [ ] palpitations [ ] murmur  Resp: [ ] negative [ ] cough [ ] shortness of breath [ ] dyspnea [ ] wheezing [ ] sputum [ ] hemoptysis  GI: [ ] negative [ ] nausea [ ] vomiting [ ] diarrhea [ ] constipation [ ] abd pain [ ] dysphagia   : [ ] negative [ ] dysuria [ ] nocturia [ ] hematuria [ ] increased urinary frequency  Musculoskeletal: [ ] negative [ ] back pain [ ] myalgias [ ] arthralgias [ ] fracture  Skin: [ ] negative [ ] rash [ ] itch  Neurological: [ ] negative [ ] headache [ ] dizziness [ ] syncope [ ] weakness [ ] numbness  Psychiatric: [ ] negative [ ] anxiety [ ] depression  Endocrine: [ ] negative [ ] diabetes [ ] thyroid problem  Hematologic/Lymphatic: [ ] negative [ ] anemia [ ] bleeding problem  Allergic/Immunologic: [ ] negative [ ] itchy eyes [ ] nasal discharge [ ] hives [ ] angioedema  [ ] All other systems negative  [x ] Unable to assess ROS because; patient is intubated and sedated      OBJECTIVE:  ICU Vital Signs Last 24 Hrs  T(C): 35.9 (21 Nov 2017 16:00), Max: 37 (20 Nov 2017 20:00)  T(F): 96.7 (21 Nov 2017 16:00), Max: 98.6 (20 Nov 2017 20:00)  HR: 82 (21 Nov 2017 17:15) (68 - 142)  BP: 107/84 (21 Nov 2017 17:15) (94/63 - 164/106)  BP(mean): 88 (21 Nov 2017 17:15) (69 - 117)  ABP: 129/74 (21 Nov 2017 17:15) (85/49 - 190/98)  ABP(mean): 90 (21 Nov 2017 17:15) (60 - 127)  RR: 20 (21 Nov 2017 17:15) (13 - 24)  SpO2: 100% (21 Nov 2017 17:15) (96% - 100%)    Mode: AC/ CMV (Assist Control/ Continuous Mandatory Ventilation), RR (machine): 20, TV (machine): 450, FiO2: 40, PEEP: 5, MAP: 18, PIP: 45    11-20 @ 07:01 - 11-21 @ 07:00  --------------------------------------------------------  IN: 2983.7 mL / OUT: 1805 mL / NET: 1178.7 mL    11-21 @ 07:01 - 11-21 @ 18:02  --------------------------------------------------------  IN: 1447.8 mL / OUT: 1120 mL / NET: 327.8 mL      POCT Blood Glucose.: 178 mg/dL (21 Nov 2017 17:34)      PHYSICAL EXAM:  General: sedated, intubated, on full vent support  Neuro: Moving all extremities to commands. No acute change from baseline exam.	  Respiratory: Air entry is decreased at left apex , has bilateral conducted sounds                                    CVS: Regular rate and rhythm. Normal S1/S2. No murmurs. Distal pulses present and equal.  Gastrointestinal: Soft, mildly distended. Bowel sounds present. TF via OG tube  Genitourinary: Pickens cath in place.  Skin: No rash.  Extremities: Warm, no cyanosis. (+) edema bilaterally.  Palpable pulses      LINES:  Left IJ (placed 11/18/17). 3 RUE peripheral lines and Right radail A-line. [facility] MEDICATIONS:  Standing Meds:  atorvastatin 80 milliGRAM(s) Oral at bedtime  buDESOnide 160 MICROgram(s)/formoterol 4.5 MICROgram(s) Inhaler 2 Puff(s) Inhalation two times a day  chlorhexidine 0.12% Liquid 15 milliLiter(s) Swish and Spit two times a day  chlorhexidine 4% Liquid 1 Application(s) Topical daily  dexmedetomidine Infusion 0.7 MICROgram(s)/kG/Hr IV Continuous <Continuous>  dextrose 5%. 1000 milliLiter(s) IV Continuous <Continuous>  dextrose 50% Injectable 12.5 Gram(s) IV Push once  dextrose 50% Injectable 25 Gram(s) IV Push once  dextrose 50% Injectable 25 Gram(s) IV Push once  fentaNYL   Infusion 1 MICROgram(s)/kG/Hr IV Continuous <Continuous>  ferrous    sulfate 325 milliGRAM(s) Oral three times a day with meals  folic acid 1 milliGRAM(s) Oral daily  insulin lispro (HumaLOG) corrective regimen sliding scale   SubCutaneous every 6 hours  ipratropium 17 MICROgram(s) HFA Inhaler 1 Puff(s) Inhalation every 6 hours  methylPREDNISolone sodium succinate Injectable 20 milliGRAM(s) IV Push every 12 hours  pantoprazole  Injectable 40 milliGRAM(s) IV Push daily  petrolatum Ophthalmic Ointment 1 Application(s) Both EYES two times a day  phenylephrine    Infusion 0.1 MICROgram(s)/kG/Min IV Continuous <Continuous>  polyethylene glycol 3350 17 Gram(s) Oral at bedtime  propofol Infusion 30 MICROgram(s)/kG/Min IV Continuous <Continuous>  senna Syrup 5 milliLiter(s) Oral three times a day  valproic  acid Syrup 500 milliGRAM(s) Oral two times a day  voriconazole IVPB 300 milliGRAM(s) IV Intermittent every 12 hours      PRN Meds:  acetaminophen  IVPB. 1000 milliGRAM(s) IV Intermittent once PRN  ALBUTerol    90 MICROgram(s) HFA Inhaler 2 Puff(s) Inhalation every 6 hours PRN  dextrose Gel 1 Dose(s) Oral once PRN  glucagon  Injectable 1 milliGRAM(s) IntraMuscular once PRN      LABS:                        8.1    9.30  )-----------( 57       ( 21 Nov 2017 04:00 )             25.0     Hgb Trend: 8.1<--, 8.6<--, 7.8<--, 7.8<--, 9.2<--  11-21    139  |  96<L>  |  18  ----------------------------<  145<H>  4.6   |  35<H>  |  0.70    Ca    8.5      21 Nov 2017 04:00  Phos  2.9     11-20  Mg     1.8     11-20    TPro  5.7<L>  /  Alb  3.0<L>  /  TBili  < 0.2<L>  /  DBili  x   /  AST  9   /  ALT  9   /  AlkPhos  58  11-21    Creatinine Trend: 0.70<--, 0.75<--, 0.62<--, 0.81<--, 0.73<--, 0.66<--      Arterial Blood Gas:  11-21 @ 04:00  7.39/62/173/34/99.8/11.2  ABG lactate: 1.4  Arterial Blood Gas:  11-20 @ 04:00  7.40/54/156/31/99.7/8.0  ABG lactate: --          ASSESSMENT & PLAN: 47yMale Hx of Sarcoidosis, recurrent hemoptysis with aspergilloma, respiratory failure-currently intubated  Neuro: Pain control with Fentanyl drip  Cardiovascular: Continue hemodynamic monitoring. maintaining BP off pressors.  Respiratory: Pt is on full vent support.  Daily CPAP trials as tolerated. Continue bronchodilators, pulmonary toilet. Dr. Caldera to discuss with transplant team at [facility]  regarding possible candidate for lung transplant.     Sarcoidosis: Continue Solumedrol 20mg Q 12hrs     Has big space at the left apex - discussed with Dr. Caldera, no intervention at this time. Dr Caldera will discuss with MICU team for possible transfer                             GI                                         On OGT feeds. OGT advanced 10cm. Check abdominal XR for position.                                         Continue GI prophylaxis with Protonix                                         Continue Zofran / Reglan for nausea - PRN	                                                                 Renal:                                         Lasix PRN                                         Monitor I/Os and electrolytes                                         Pickens                                                  Hem/ Onc:                                                                                  Monitor chest tube output &  signs of bleeding.                                          Follow CBC in AM                           Infectious disease:                                            Aspergilloma - On voriconazole                                           All surgical incision / chest tube  sites look clean                            Endocrine                                             Continue Accu-Checks with coverage    Pt is on SQ Heparin and Venodynes for DVT prophylaxis.       ID:   Continue voriconazole pending fungal culture.  Fungal cultures can be lower yield- may be best to treat empirically for 6 more weeks. MICU Transfer Note    Transfer from: CT-ICU    Transfer to: (  ) Medicine    (  ) Telemetry     (   ) RCU        (    ) Palliative         (   ) Stroke Unit          ( x  ) MICU    Accepting Physician: Dr. Medina    [facility]  COURSE:    47 M PMH of sarcoidosis (Dx 2006 @ [facility]), HTN, COPD, and asthma who was initially admitted on 09/10 w cc of severe leg spasms for two days. On 9/12 at 4 am, a RRT and Stroke codes were called as the patient became unresponsive.  He was transferred to MICU and then intubated for airway protection. The patient was found to have a new infarct on MRI brain and also found to have a patent PFO on this admission as well. He was weaned off vent the following day, on 09/13 and transferred to the floors. There was concern patient was having paroxysmal afib, so he was started on Eliquis and received 2 doses of Eliquis on 09/19.  However, on 09/20, the patient had a cough productive of fresh blood, (about 1/2 cup). Eliquis was held. He underwent Bronchoscopy with bilateral bronchoalveolar lavage and epinephrine injection on 09/21.  Patient developed hemoptysis again and underwent urgent Left Upper lobectomy with VATS on 9/24/17. 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 Procedure was complicated with post op bleeding and patient was taken back to the OR on 9/25/17 for evacuation of hemothorax and identified bleeding of the posterior intercostal arteries which were cauterized. Patient continued to have DAVID air space with prolonged air leak despite pigtail cath placed to suction, MEYER and Left Hemiparesis 2/2 CVA. He began clinically improving in CTICU. Bedside talc pleurodesis preformed 10/23/17. Patient then developed SIRS requiring pressors for hypotension. Eliquis restarted and neuro deficits began improving. Air leak resolved 10/27 and pigtail cath removed 10/30. Steroids were tapered and pressors transitioned to midodrine. Patient was ambulating and deemed stable for discharge on 11/2. Patient dc'd to Acute Rehab. Voriconazole to be completed 11/4 and outpt follow up with Cardiology recommended to close PFO.     Patient returned this admission 11/9 for recurrent hemoptysis. Initially seen at [facility] and transferred for 2 episodes of hemoptysis. Patient was intubated and admitted to CTICU. DAVID PTX persists. Patient had BAL in OR 11/15, then later had rpt BAL in CTICU for persistent hemoptysis via ETT. 11/16 patient had an IR embolization of left bronchial artery, Bronchoscopy with BAL post IR embolization - w/o evidence of active bleeding. Rpt bronch 11/17 and 11/20 in CTICU via ETT; No evidence of active bleeding, only old clots. Vent weaning trials to CPAP daily w/o success despite Precedex. Patient transferred to MICU for continued care and vent weaning.       PAST MEDICAL & SURGICAL HISTORY:  History of pneumothorax: History of 3 prior pneumothoracies.  COPD (chronic obstructive pulmonary disease)  Asthma  Hypertension  Sarcoidosis  History of thoracotomy: 9/24/17 Left thoracotomy, Left upper lobectomy   9/25/17 Reop left thoracotomy, evacuation of left hemothorax      FAMILY HISTORY:  Family history of pancreatic cancer  Family history of essential hypertension (Father)      SOCIAL HISTORY:  Smoking: [ x ] Never Smoked [ ] Former Smoker (__ packs x ___ years) [ ] Current Smoker  (__ packs x ___ years)  Substance Use: [ x ] Never Used [ ] Used ____  EtOH Use: none  Marital Status: [ x ] Single [ ]  [ ]  [ ]   Occupation: [employer] worker, clean up crew  Recent Travel: none  Country of Birth: [place]  Advance Directives: FULL CODE    Allergies: No Known Allergies or Intolerances      HOME MEDICATIONS:  Home Medications:  acetaminophen 325 mg oral tablet: 2 tab(s) orally every 6 hours, As needed, Mild Pain (1 - 3) (02 Nov 2017 11:46)  apixaban 5 mg oral tablet: 1 tab(s) orally every 12 hours (02 Nov 2017 11:49)  atorvastatin 80 mg oral tablet: 1 tab(s) orally once a day (at bedtime) (02 Nov 2017 11:49)  budesonide-formoterol 160 mcg-4.5 mcg/inh inhalation aerosol: 2 puff(s) inhaled 2 times a day (02 Nov 2017 11:49)  divalproex sodium 500 mg oral delayed release tablet: 1 tab(s) orally 2 times a day (02 Nov 2017 11:49)  docusate sodium 100 mg oral capsule: 1 cap(s) orally 2 times a day (02 Nov 2017 11:49)  ferrous sulfate 325 mg (65 mg elemental iron) oral tablet: 1  orally 3 times a day (02 Nov 2017 11:49)  folic acid 1 mg oral tablet: 1 tab(s) orally once a day (02 Nov 2017 11:50)  ipratropium 500 mcg/2.5 mL inhalation solution: 2.5 milliliter(s) inhaled every 6 hours (02 Nov 2017 11:49)  levalbuterol 0.63 mg/3 mL inhalation solution: 3 milliliter(s) inhaled every 6 hours (02 Nov 2017 11:49)  lidocaine 5% topical film: Apply topically to affected area once a day (02 Nov 2017 11:49)  oxyCODONE 10 mg oral tablet: 1 tab(s) orally every 4 hours, As needed, Severe Pain (7 - 10) (02 Nov 2017 11:46)  oxyCODONE 5 mg oral tablet: 1 tab(s) orally every 4 hours, As needed, Moderate Pain (4 - 6) (02 Nov 2017 11:46)  pantoprazole 40 mg oral delayed release tablet: 1 tab(s) orally 2 times a day (before meals) (02 Nov 2017 11:50)  polyethylene glycol 3350 oral powder for reconstitution: 17 gram(s) orally once a day (at bedtime) (02 Nov 2017 11:50)  predniSONE: 30 milligram(s) orally once a day until Nov 6th then 20mg daily x 5 days then 10mg daily indefinitely.  (02 Nov 2017 11:46)      REVIEW OF SYSTEMS:  Constitutional: [ ] negative [ ] fevers [ ] chills [ ] weight loss [ ] weight gain  HEENT: [ ] negative [ ] dry eyes [ ] eye irritation [ ] postnasal drip [ ] nasal congestion  CV: [ ] negative  [ ] chest pain [ ] orthopnea [ ] palpitations [ ] murmur  Resp: [ ] negative [ ] cough [ ] shortness of breath [ ] dyspnea [ ] wheezing [ ] sputum [ ] hemoptysis  GI: [ ] negative [ ] nausea [ ] vomiting [ ] diarrhea [ ] constipation [ ] abd pain [ ] dysphagia   : [ ] negative [ ] dysuria [ ] nocturia [ ] hematuria [ ] increased urinary frequency  Musculoskeletal: [ ] negative [ ] back pain [ ] myalgias [ ] arthralgias [ ] fracture  Skin: [ ] negative [ ] rash [ ] itch  Neurological: [ ] negative [ ] headache [ ] dizziness [ ] syncope [ ] weakness [ ] numbness  Psychiatric: [ ] negative [ ] anxiety [ ] depression  Endocrine: [ ] negative [ ] diabetes [ ] thyroid problem  Hematologic/Lymphatic: [ ] negative [ ] anemia [ ] bleeding problem  Allergic/Immunologic: [ ] negative [ ] itchy eyes [ ] nasal discharge [ ] hives [ ] angioedema  [ ] All other systems negative  [x ] Unable to assess ROS because; patient is intubated and sedated      OBJECTIVE:  ICU Vital Signs Last 24 Hrs  T(C): 35.9 (21 Nov 2017 16:00), Max: 37 (20 Nov 2017 20:00)  T(F): 96.7 (21 Nov 2017 16:00), Max: 98.6 (20 Nov 2017 20:00)  HR: 82 (21 Nov 2017 17:15) (68 - 142)  BP: 107/84 (21 Nov 2017 17:15) (94/63 - 164/106)  BP(mean): 88 (21 Nov 2017 17:15) (69 - 117)  ABP: 129/74 (21 Nov 2017 17:15) (85/49 - 190/98)  ABP(mean): 90 (21 Nov 2017 17:15) (60 - 127)  RR: 20 (21 Nov 2017 17:15) (13 - 24)  SpO2: 100% (21 Nov 2017 17:15) (96% - 100%)    Mode: AC/ CMV (Assist Control/ Continuous Mandatory Ventilation), RR (machine): 20, TV (machine): 450, FiO2: 40, PEEP: 5, MAP: 18, PIP: 45    11-20 @ 07:01  -  11-21 @ 07:00  --------------------------------------------------------  IN: 2983.7 mL / OUT: 1805 mL / NET: 1178.7 mL    11-21 @ 07:01  -  11-21 @ 18:02  --------------------------------------------------------  IN: 1447.8 mL / OUT: 1120 mL / NET: 327.8 mL      POCT Blood Glucose.: 178 mg/dL (21 Nov 2017 17:34)      PHYSICAL EXAM:  General: sedated, intubated, on full vent support  Neuro: Moving all extremities to commands. No acute change from baseline exam.	  Respiratory: Air entry is decreased at left apex , has bilateral conducted sounds                                    CVS: Regular rate and rhythm. Normal S1/S2. No murmurs. Distal pulses present and equal.  Gastrointestinal: Soft, mildly distended. Bowel sounds present. TF via OG tube  Genitourinary: Pickens cath in place, yellow urine draining  Skin: No rash.  Extremities: Warm, no cyanosis. (+) edema bilaterally.  Palpable pulses      LINES:  Left IJ (placed 11/18/17). 3 RUE peripheral lines and Right radail A-line. Pickens cath      HOSPITAL MEDICATIONS:  Standing Meds:  atorvastatin 80 milliGRAM(s) Oral at bedtime  buDESOnide 160 MICROgram(s)/formoterol 4.5 MICROgram(s) Inhaler 2 Puff(s) Inhalation two times a day  chlorhexidine 0.12% Liquid 15 milliLiter(s) Swish and Spit two times a day  chlorhexidine 4% Liquid 1 Application(s) Topical daily  dexmedetomidine Infusion 0.7 MICROgram(s)/kG/Hr IV Continuous <Continuous>  dextrose 5%. 1000 milliLiter(s) IV Continuous <Continuous>  dextrose 50% Injectable 12.5 Gram(s) IV Push once  dextrose 50% Injectable 25 Gram(s) IV Push once  dextrose 50% Injectable 25 Gram(s) IV Push once  fentaNYL   Infusion 1 MICROgram(s)/kG/Hr IV Continuous <Continuous>  ferrous    sulfate 325 milliGRAM(s) Oral three times a day with meals  folic acid 1 milliGRAM(s) Oral daily  insulin lispro (HumaLOG) corrective regimen sliding scale   SubCutaneous every 6 hours  ipratropium 17 MICROgram(s) HFA Inhaler 1 Puff(s) Inhalation every 6 hours  methylPREDNISolone sodium succinate Injectable 20 milliGRAM(s) IV Push every 12 hours  pantoprazole  Injectable 40 milliGRAM(s) IV Push daily  petrolatum Ophthalmic Ointment 1 Application(s) Both EYES two times a day  phenylephrine    Infusion 0.1 MICROgram(s)/kG/Min IV Continuous <Continuous>  polyethylene glycol 3350 17 Gram(s) Oral at bedtime  propofol Infusion 30 MICROgram(s)/kG/Min IV Continuous <Continuous>  senna Syrup 5 milliLiter(s) Oral three times a day  valproic  acid Syrup 500 milliGRAM(s) Oral two times a day  voriconazole IVPB 300 milliGRAM(s) IV Intermittent every 12 hours      PRN Meds:  acetaminophen  IVPB. 1000 milliGRAM(s) IV Intermittent once PRN  ALBUTerol    90 MICROgram(s) HFA Inhaler 2 Puff(s) Inhalation every 6 hours PRN  dextrose Gel 1 Dose(s) Oral once PRN  glucagon  Injectable 1 milliGRAM(s) IntraMuscular once PRN      LABS:                        8.1    9.30  )-----------( 57       ( 21 Nov 2017 04:00 )             25.0     Hgb Trend: 8.1<--, 8.6<--, 7.8<--, 7.8<--, 9.2<--  11-21    139  |  96<L>  |  18  ----------------------------<  145<H>  4.6   |  35<H>  |  0.70    Ca    8.5      21 Nov 2017 04:00  Phos  2.9     11-20  Mg     1.8     11-20    TPro  5.7<L>  /  Alb  3.0<L>  /  TBili  < 0.2<L>  /  DBili  x   /  AST  9   /  ALT  9   /  AlkPhos  58  11-21    Creatinine Trend: 0.70<--, 0.75<--, 0.62<--, 0.81<--, 0.73<--, 0.66<--      Arterial Blood Gas:  11-21 @ 04:00  7.39/62/173/34/99.8/11.2  ABG lactate: 1.4  Arterial Blood Gas:  11-20 @ 04:00  7.40/54/156/31/99.7/8.0  ABG lactate: --          ASSESSMENT & PLAN: 47yMale Hx of Sarcoidosis, recurrent hemoptysis with aspergilloma, respiratory failure-currently intubated  Neuro:   -Pain control with Fentanyl drip    Cardiovascular:   -Continue hemodynamic monitoring. maintaining BP off pressors.    Respiratory:   -c/w full vent support.    -Daily CPAP trials to wean from vent as tolerated.   -Continue bronchodilators, pulmonary toilet.   -Dr. Caldera to discuss with transplant team at [place]  regarding possible candidate for lung transplant.   -Continue Solumedrol 20mg Q 12hrs for Sarcoidosis treatment, taper as tolerated.   -Left apex air space- per Dr. Caldera, no intervention at this time.     GI:   -On OGT feeds. OGT in proper position on abdominal XR   -Continue GI prophylaxis with Protonix  -Continue Zofran / Reglan for nausea - PRN	                                         Renal:  -Lasix PRN  -Monitor I/Os and electrolytes  -c/w [place]                             Hem/ Onc:  -monitor H&H, has been relatively stable. Follow CBC in AM      Infectious disease:  -Aspergilloma - On voriconazole. Per ID: Continue voriconazole pending fungal culture.  Fungal cultures can be lower yield- may be best to treat empirically for 6 more weeks.  -All surgical incision / chest tube  sites look clean      Endocrine:  -Continue Accu-Checks with coverage      DVT prophylaxis:  -SQ Heparin and Venodynes

## 2017-11-21 NOTE — PROGRESS NOTE ADULT - SUBJECTIVE AND OBJECTIVE BOX
OSCAR CHACKO            MRN-[id number]         No Known Allergies               47 M PMH of sarcoidosis, HTN, COPD, and asthma who was initially admitted on 09/10 w cc of severe leg spasms X two days  On 9/12 at 4 am, a RRT and Stroke codes were called as the patient became unresponsive.  He was transferred to MICU and then intubated for airway protection.   The patient was found to have a new infarct on MRI and also found to have a patent PFO on this admission as well. He was weaned off vent on 09/13 and transferred to the floors   There was concern patient was having paroxysmal afib, so he was started on Eliquis and received 2 doses of Eliquis on 09/19.  However, on 09/20, the patient had a cough productive of  fresh blood, about 1/2 cup of blood. He underwent Bronchoscopy with bronchoalveolar lavage of both sides and epi injection on 09/21  However, he developed hemoptysis again and underwent urgent LULectomy, complicated with post op bleeding.  [id number]:  Intubated fro airway protection  [id number]:  Extubated  [id number]:  FOB 2/2 large hemoptysis  [id number]:   Left VATS and DAVID lobectomy 9/24/17   [id number]:   Re-op L thoracotomy, evacuation of hemothorax (600cc clot, 400cc blood) no obvious source of bleeding      Re-op L thoracotomy, evacuation of hemothorax; RTOR x2 100cc clot; 200cc blood: bleeding posterior intercostal arteries identified and cauterized 9/25/17   [id number]:   L Pigtail catheter (IR) for L apical PTX.  (Transferred back to ICU 2/2 increased SOB)  [id number]   Bedside talc pleurodesis    Pt eventually discharged to rehab - readmitted on [id number]    [id number]:   Bronchoscopy with bronchoalveolar lavage  in OR- Remained intubated  [id number]:   Repeat bronchoscopy in ICU for persistent hemoptysis via ETT; bronchial block placement into left main bronchus  [id number]:   IR left bronchial artery embolization, Bronchoscopy with BAL post IR embolization - w/o evidence of active bleeding  [id number]:   Repeat bronchoscopy in ICU  via ETT; No evidence of active bleeding. Old clots  11/20/17 Bronchoscopy - cleared old clots    Issues:                 Acute resp failure on vent              hemoptysis              Aspergilloma on Voriconazole              Sarcoidosis               PFO               Anemia  CVA  with L-side weakness  L-Apical pneumo      Interval/Overnight Events/ ROS  Hemodynamically stable, not on any pressors or inotropes Intubated, sedated and paralyzed. ,    Home Medications:  acetaminophen 325 mg oral tablet: 2 tab(s) orally every 6 hours, As needed, Mild Pain (1 - 3) (02 Nov 2017 11:46)  apixaban 5 mg oral tablet: 1 tab(s) orally every 12 hours (02 Nov 2017 11:49)  atorvastatin 80 mg oral tablet: 1 tab(s) orally once a day (at bedtime) (02 Nov 2017 11:49)  budesonide-formoterol 160 mcg-4.5 mcg/inh inhalation aerosol: 2 puff(s) inhaled 2 times a day (02 Nov 2017 11:49)  divalproex sodium 500 mg oral delayed release tablet: 1 tab(s) orally 2 times a day (02 Nov 2017 11:49)  docusate sodium 100 mg oral capsule: 1 cap(s) orally 2 times a day (02 Nov 2017 11:49)  ferrous sulfate 325 mg (65 mg elemental iron) oral tablet: 1  orally 3 times a day (02 Nov 2017 11:49)  folic acid 1 mg oral tablet: 1 tab(s) orally once a day (02 Nov 2017 11:50)  ipratropium 500 mcg/2.5 mL inhalation solution: 2.5 milliliter(s) inhaled every 6 hours (02 Nov 2017 11:49)  levalbuterol 0.63 mg/3 mL inhalation solution: 3 milliliter(s) inhaled every 6 hours (02 Nov 2017 11:49)  lidocaine 5% topical film: Apply topically to affected area once a day (02 Nov 2017 11:49)  oxyCODONE 10 mg oral tablet: 1 tab(s) orally every 4 hours, As needed, Severe Pain (7 - 10) (02 Nov 2017 11:46)  oxyCODONE 5 mg oral tablet: 1 tab(s) orally every 4 hours, As needed, Moderate Pain (4 - 6) (02 Nov 2017 11:46)  pantoprazole 40 mg oral delayed release tablet: 1 tab(s) orally 2 times a day (before meals) (02 Nov 2017 11:50)  polyethylene glycol 3350 oral powder for reconstitution: 17 gram(s) orally once a day (at bedtime) (02 Nov 2017 11:50)  predniSONE: 30 milligram(s) orally once a day until Nov 6th then 20mg daily x 5 days then 10mg daily indefinitely.  (02 Nov 2017 11:46)      PAST MEDICAL & SURGICAL HISTORY:  History of pneumothorax: History of 3 prior pneumonthoracies.  COPD (chronic obstructive pulmonary disease)  Asthma  Hypertension  Sarcoidosis  History of thoracotomy: 9/24/17 Left thoracotomy, Left upper lobectomy   9/25/17 Reop left thoracotomy, evacuation of left hemothorax        ICU Vital Signs Last 24 Hrs  T(C): 36.5 (21 Nov 2017 08:00), Max: 37 (20 Nov 2017 20:00)  T(F): 97.7 (21 Nov 2017 08:00), Max: 98.6 (20 Nov 2017 20:00)  HR: 108 (21 Nov 2017 09:31) (79 - 142)  BP: 164/106 (21 Nov 2017 09:00) (113/83 - 164/106)  BP(mean): 117 (21 Nov 2017 09:00) (90 - 117)  ABP: 190/98 (21 Nov 2017 09:00) (85/49 - 190/98)  ABP(mean): 126 (21 Nov 2017 09:00) (60 - 127)  RR: 18 (21 Nov 2017 09:00) (15 - 24)  SpO2: 100% (21 Nov 2017 09:31) (93% - 100%)    I&O's Summary    20 Nov 2017 07:01  -  21 Nov 2017 07:00  --------------------------------------------------------  IN: 2983.7 mL / OUT: 1805 mL / NET: 1178.7 mL    21 Nov 2017 07:01  -  21 Nov 2017 10:20  --------------------------------------------------------  IN: 406.8 mL / OUT: 720 mL / NET: -313.2 mL      CAPILLARY BLOOD GLUCOSE      POCT Blood Glucose.: 120 mg/dL (21 Nov 2017 06:05)      MEDICATIONS  (STANDING):  atorvastatin 80 milliGRAM(s) Oral at bedtime  buDESOnide 160 MICROgram(s)/formoterol 4.5 MICROgram(s) Inhaler 2 Puff(s) Inhalation two times a day  chlorhexidine 0.12% Liquid 15 milliLiter(s) Swish and Spit two times a day  chlorhexidine 4% Liquid 1 Application(s) Topical daily  dexmedetomidine Infusion 0.7 MICROgram(s)/kG/Hr (13.125 mL/Hr) IV Continuous <Continuous>  dextrose 5%. 1000 milliLiter(s) (50 mL/Hr) IV Continuous <Continuous>  dextrose 50% Injectable 12.5 Gram(s) IV Push once  dextrose 50% Injectable 25 Gram(s) IV Push once  dextrose 50% Injectable 25 Gram(s) IV Push once  fentaNYL   Infusion 1 MICROgram(s)/kG/Hr (7.5 mL/Hr) IV Continuous <Continuous>  ferrous    sulfate 325 milliGRAM(s) Oral three times a day with meals  folic acid 1 milliGRAM(s) Oral daily  insulin lispro (HumaLOG) corrective regimen sliding scale   SubCutaneous every 6 hours  ipratropium 17 MICROgram(s) HFA Inhaler 1 Puff(s) Inhalation every 6 hours  methylPREDNISolone sodium succinate Injectable 20 milliGRAM(s) IV Push every 12 hours  pantoprazole  Injectable 40 milliGRAM(s) IV Push daily  petrolatum Ophthalmic Ointment 1 Application(s) Both EYES two times a day  phenylephrine    Infusion 0.1 MICROgram(s)/kG/Min (2.813 mL/Hr) IV Continuous <Continuous>  polyethylene glycol 3350 17 Gram(s) Oral at bedtime  propofol Infusion 30 MICROgram(s)/kG/Min (13.5 mL/Hr) IV Continuous <Continuous>  senna Syrup 5 milliLiter(s) Oral three times a day  valproic  acid Syrup 500 milliGRAM(s) Oral two times a day  voriconazole IVPB 300 milliGRAM(s) IV Intermittent every 12 hours    MEDICATIONS  (PRN):  acetaminophen  IVPB. 1000 milliGRAM(s) IV Intermittent once PRN Moderate Pain (4 - 6)  ALBUTerol    90 MICROgram(s) HFA Inhaler 2 Puff(s) Inhalation every 6 hours PRN Shortness of Breath and/or Wheezing  dextrose Gel 1 Dose(s) Oral once PRN Blood Glucose LESS THAN 70 milliGRAM(s)/deciliter  glucagon  Injectable 1 milliGRAM(s) IntraMuscular once PRN Glucose LESS THAN 70 milligrams/deciliter      Mode: AC/ CMV (Assist Control/ Continuous Mandatory Ventilation)  RR (machine): 24  TV (machine): 450  FiO2: 40  PEEP: 5  MAP: 23  PIP: 54      Physical exam:     General:               Pt is off sedation                                       Neuro:                  Sedated                           Cardiovascular:   S1 & S2, regular                           Respiratory:         Air entry is decreased at left apex , has bilateral conducted sounds                           GI:                          Soft, nondistended and nontender, Bowel sounds hypoactive                            Ext:                        No cyanosis or edema                            Labs:                                                                           8.1    9.30  )-----------( 57       ( 21 Nov 2017 04:00 )             25.0             11-21    139  |  96<L>  |  18  ----------------------------<  145<H>  4.6   |  35<H>  |  0.70    Ca    8.5      21 Nov 2017 04:00  Phos  2.9     11-20  Mg     1.8     11-20    TPro  5.7<L>  /  Alb  3.0<L>  /  TBili  < 0.2<L>  /  DBili  x   /  AST  9   /  ALT  9   /  AlkPhos  58  11-21                    LIVER FUNCTIONS - ( 21 Nov 2017 04:00 )  Alb: 3.0 g/dL / Pro: 5.7 g/dL / ALK PHOS: 58 u/L / ALT: 9 u/L / AST: 9 u/L / GGT: x             CXR:  < from: Xray Chest 1 View AP -PORTABLE-Routine (11.21.17 @ 07:07) >  Lines and tubes unchanged. Note that the side port of the enteric tube is   at the level of the distal esophagus. Clinical correlation will determine   the need for repositioning.    Stable left apical pneumothorax, bilateral lung opacities, and small   pleural effusions.      Plan:    General: 47yMale Hx of Sarcoidosis, hemoptysis, respiratory failure currently intubated                            Neuro:                                         Pain control with Fentanyl drip                            Cardiovascular:                                          Continue hemodynamic monitoring. tachycardic probably secondary to respiratory status                            Respiratory:                                         Pt is on full vent support.                                           Plan to CPAP as tolerated                                                              Continue bronchodilators, pulmonary toilet     Dr. Caldera to discuss with transplant team at [place]  regarding possible candidate for lung transplant.     Sarcoidosis: Continue Solumedrol 20mg Q 12hrs     Has big space at the left apex - discussed with Dr. Caldera, no intervention at this time. Dr Caldera will discuss with MICU team for possible transfer                             GI                                         On OGT feeds. OGT advanced 10cm. Check abdominal XR for position.                                         Continue GI prophylaxis with Protonix                                         Continue Zofran / Reglan for nausea - PRN	                                                                 Renal:                                         Lasix PRN                                         Monitor I/Os and electrolytes                                         [place]                                                  Hem/ Onc:                                                                                  Monitor chest tube output &  signs of bleeding.                                          Follow CBC in AM                           Infectious disease:                                            Aspergilloma - On voriconazole                                           All surgical incision / chest tube  sites look clean                            Endocrine                                             Continue Accu-Checks with coverage    Pt is on SQ Heparin and Venodynes for DVT prophylaxis.     Pertinent clinical, laboratory, radiographic, hemodynamic, echocardiographic, respiratory data, microbiologic data and chart were reviewed and analyzed frequently throughout the course of the day and night.  Patient seen, examined and plan discussed with CT Surgeon Dr. Caldera / CTICU team during rounds.    Pt's status discussed with family at bedside, updated status    I have spent 90  minutes of critical care time with this pt between  8 am and 11.59 pm      Len Kim MD

## 2017-11-21 NOTE — PROGRESS NOTE ADULT - ATTENDING COMMENTS
pt doing poorly  paralyzed, sedated but not requiring high FIO2 :   no bleeding noted:  try to wean sedation as well as paralyzing agent   Steroids have been decreased: goal would be rapidly tapered down steroids to minimum possible given h e is on sedation and paralyzing agent: But per reports pt gets very agitated when taken of paralyzing agent!    11/20: off paralyzing agent; cont current care: may decrease steroids to 30 mg in a day or two    11/21: off sedation : alert and awake and responds to simple commands; weaning trials!!

## 2017-11-21 NOTE — PROGRESS NOTE ADULT - SUBJECTIVE AND OBJECTIVE BOX
Patient is a 47y old  Male who presents with a chief complaint of hemoptysis (09 Nov 2017 00:32)  doing ok: awake: still intubated: on 40% fio2    Any change in ROS:     MEDICATIONS  (STANDING):  atorvastatin 80 milliGRAM(s) Oral at bedtime  buDESOnide 160 MICROgram(s)/formoterol 4.5 MICROgram(s) Inhaler 2 Puff(s) Inhalation two times a day  chlorhexidine 0.12% Liquid 15 milliLiter(s) Swish and Spit two times a day  chlorhexidine 4% Liquid 1 Application(s) Topical daily  dexmedetomidine Infusion 0.7 MICROgram(s)/kG/Hr (13.125 mL/Hr) IV Continuous <Continuous>  dextrose 5%. 1000 milliLiter(s) (50 mL/Hr) IV Continuous <Continuous>  dextrose 50% Injectable 12.5 Gram(s) IV Push once  dextrose 50% Injectable 25 Gram(s) IV Push once  dextrose 50% Injectable 25 Gram(s) IV Push once  fentaNYL   Infusion 1 MICROgram(s)/kG/Hr (7.5 mL/Hr) IV Continuous <Continuous>  ferrous    sulfate 325 milliGRAM(s) Oral three times a day with meals  folic acid 1 milliGRAM(s) Oral daily  insulin lispro (HumaLOG) corrective regimen sliding scale   SubCutaneous every 6 hours  ipratropium 17 MICROgram(s) HFA Inhaler 1 Puff(s) Inhalation every 6 hours  methylPREDNISolone sodium succinate Injectable 20 milliGRAM(s) IV Push every 12 hours  pantoprazole  Injectable 40 milliGRAM(s) IV Push daily  petrolatum Ophthalmic Ointment 1 Application(s) Both EYES two times a day  phenylephrine    Infusion 0.1 MICROgram(s)/kG/Min (2.813 mL/Hr) IV Continuous <Continuous>  polyethylene glycol 3350 17 Gram(s) Oral at bedtime  propofol Infusion 30 MICROgram(s)/kG/Min (13.5 mL/Hr) IV Continuous <Continuous>  senna Syrup 5 milliLiter(s) Oral three times a day  valproic  acid Syrup 500 milliGRAM(s) Oral two times a day  voriconazole IVPB 300 milliGRAM(s) IV Intermittent every 12 hours    MEDICATIONS  (PRN):  acetaminophen  IVPB. 1000 milliGRAM(s) IV Intermittent once PRN Moderate Pain (4 - 6)  ALBUTerol    90 MICROgram(s) HFA Inhaler 2 Puff(s) Inhalation every 6 hours PRN Shortness of Breath and/or Wheezing  dextrose Gel 1 Dose(s) Oral once PRN Blood Glucose LESS THAN 70 milliGRAM(s)/deciliter  glucagon  Injectable 1 milliGRAM(s) IntraMuscular once PRN Glucose LESS THAN 70 milligrams/deciliter    Vital Signs Last 24 Hrs  T(C): 35.9 (21 Nov 2017 16:00), Max: 37 (20 Nov 2017 20:00)  T(F): 96.7 (21 Nov 2017 16:00), Max: 98.6 (20 Nov 2017 20:00)  HR: 72 (21 Nov 2017 16:35) (68 - 142)  BP: 118/87 (21 Nov 2017 16:00) (94/63 - 164/106)  BP(mean): 94 (21 Nov 2017 16:00) (69 - 117)  RR: 19 (21 Nov 2017 16:00) (13 - 24)  SpO2: 99% (21 Nov 2017 16:35) (96% - 100%)  Mode: AC/ CMV (Assist Control/ Continuous Mandatory Ventilation)  RR (machine): 20  TV (machine): 450  FiO2: 40  PEEP: 5  MAP: 18  PIP: 45    I&O's Summary    20 Nov 2017 07:01  -  21 Nov 2017 07:00  --------------------------------------------------------  IN: 2983.7 mL / OUT: 1805 mL / NET: 1178.7 mL    21 Nov 2017 07:01  -  21 Nov 2017 17:00  --------------------------------------------------------  IN: 1447.8 mL / OUT: 1120 mL / NET: 327.8 mL          Physical Exam:   GENERAL: NAD, well-groomed, well-developed  HEENT: GIOVANI/   Atraumatic, Normocephalic  ENMT: No tonsillar erythema, exudates, or enlargement; Moist mucous membranes, Good dentition, No lesions  NECK: Supple, No JVD, Normal thyroid  CHEST/LUNG: Coarse rhonchi  CVS: Regular rate and rhythm; No murmurs, rubs, or gallops  GI: : Soft, Nontender, Nondistended; Bowel sounds present  NERVOUS SYSTEM:  Alert & awake  EXTREMITIES:  2+ Peripheral Pulses, No clubbing, cyanosis, or edema  LYMPH: No lymphadenopathy noted  SKIN: No rashes or lesions  ENDOCRINOLOGY: No Thyromegaly  PSYCH: Appropriate    Labs:  ABG - ( 21 Nov 2017 04:00 )  pH: 7.39  /  pCO2: 62    /  pO2: 173   / HCO3: 34    / Base Excess: 11.2  /  SaO2: 99.8                               8.1    9.30  )-----------( 57       ( 21 Nov 2017 04:00 )             25.0                         8.6    11.77 )-----------( 82       ( 20 Nov 2017 04:00 )             26.1                         7.8    8.06  )-----------( 61       ( 19 Nov 2017 07:20 )             24.7                         7.8    8.04  )-----------( 61       ( 19 Nov 2017 04:45 )             23.1                         9.2    14.32 )-----------( 77       ( 18 Nov 2017 03:40 )             29.7     11-21    139  |  96<L>  |  18  ----------------------------<  145<H>  4.6   |  35<H>  |  0.70  11-20    137  |  97<L>  |  17  ----------------------------<  158<H>  4.8   |  31  |  0.75  11-19    135  |  95<L>  |  16  ----------------------------<  177<H>  3.9   |  30  |  0.62  11-18    136  |  95<L>  |  19  ----------------------------<  157<H>  4.7   |  32<H>  |  0.81  11-17    135  |  94<L>  |  18  ----------------------------<  190<H>  4.5   |  32<H>  |  0.73    Ca    8.5      21 Nov 2017 04:00  Ca    8.6      20 Nov 2017 04:00  Phos  2.9     11-20  Mg     1.8     11-20    TPro  5.7<L>  /  Alb  3.0<L>  /  TBili  < 0.2<L>  /  DBili  x   /  AST  9   /  ALT  9   /  AlkPhos  58  11-21  TPro  5.1<L>  /  Alb  2.6<L>  /  TBili  0.2  /  DBili  x   /  AST  14  /  ALT  11  /  AlkPhos  52  11-19    CAPILLARY BLOOD GLUCOSE      POCT Blood Glucose.: 141 mg/dL (21 Nov 2017 11:48)  POCT Blood Glucose.: 120 mg/dL (21 Nov 2017 06:05)  POCT Blood Glucose.: 178 mg/dL (21 Nov 2017 00:49)  POCT Blood Glucose.: 135 mg/dL (20 Nov 2017 18:25)      LIVER FUNCTIONS - ( 21 Nov 2017 04:00 )  Alb: 3.0 g/dL / Pro: 5.7 g/dL / ALK PHOS: 58 u/L / ALT: 9 u/L / AST: 9 u/L / GGT: x               Cultures:           Wound culture:                11-15 @ 16:22  Organism --  Culture w/ gram stain --  Specimen Source BRONCHIAL LAVAGE    Wound culture:                11-15 @ 09:22  Organism --  Culture w/ gram stain --  Specimen Source BLOOD      Abscess culture:             11-15 @ 16:22  Organism --  Gram Stain --  Specimen Source BRONCHIAL LAVAGE    Abscess culture:             11-15 @ 09:22  Organism --  Gram Stain --  Specimen Source BLOOD        Tissue culture:           11-15 @ 16:22  Organism --  Gram Stain --  Specimen Source BRONCHIAL LAVAGE    Tissue culture:           11-15 @ 09:22  Organism --  Gram Stain --  Specimen Source BLOOD      Body Fluid Smear & Culture:                        11-15 @ 16:22  AFB Smear  --  Culture Acid Fast Body Fluid w/ Smear  --  Culture Acid Fast Smear Concentrated     AFB SMEAR= NO ACID FAST BACILLI SEEN    Culture Results:     --  Specimen Source BRONCHIAL LAVAGE    Body Fluid Smear & Culture:                        11-15 @ 09:22  AFB Smear  --  Culture Acid Fast Body Fluid w/ Smear  --  Culture Acid Fast Smear Concentrated   --    Culture Results:     --  Specimen Source BLOOD      < from: Xray Abdomen 1 View-Upright Only (11.21.17 @ 11:23) >        INTERPRETATION:  CLINICAL INDICATION: Evaluate for NG tube position.    TECHNIQUE: AP view of the abdomen.     COMPARISON: None.    FINDINGS:   There is an enteric feeding tubecoursing below the diaphragm with the   tip and side port overlying the distal stomach.    Nonobstructive bowel gas pattern. No free air.    IMPRESSION: Enteric feeding tube coursing below the diaphragm the tip and   side port overlying the distal stomach.    < end of copied text >          Studies  Chest X-RAY  CT SCAN Chest   Venous Dopplers: LE:   CT Abdomen  Others

## 2017-11-21 NOTE — PROGRESS NOTE ADULT - PROBLEM SELECTOR PLAN 4
on prednisone.  11/10 continue steroid  11/11 on steroid  11/12 on oral steroid  11/13: cont oral steroids!!  11/14: Has pretty poor lungs secondary to steroids: Cont current therapy!!  11/16: on high dose steroids for now , would need to taper down the steroids to 10 or 20 mg of prednisone chronically:  11/17 steroid  11/18 on Steroid  11/20:n 40 mg  of solumedrol for now: considering decreasing to 30 mg in a day or tow  11/21: would decrease his steroids to 30 mg a day

## 2017-11-21 NOTE — PROGRESS NOTE ADULT - PROBLEM SELECTOR PLAN 1
intubated: try to titrate as tolerated!  11/18 on ventilator support. Pt is sedated and paralyzed. defer ventilator Management/ weaning per CTICU team.  11/19 still sedated and paralyzed. Plan to wean off Nimbex today per Primary RN. ABGs reviewed.  11/20:ff paralyzing agent!But sedated !!  11/21: awake and alert: responding to simpe coomands: weaning trials

## 2017-11-21 NOTE — PROGRESS NOTE ADULT - PROBLEM SELECTOR PLAN 3
Cont BD with inhaled steroids and oral steroids  11/114: Pts wheezing has significantly improved: he will need long term steroids as he starts wheezing once his steroids are dced!  11/15: Wheezing has increased: started on IV steroids high dose by thoracic team: Would suggest to decrease to 20 mg three times a day !  11/16: decrease steroids today to 20 mg three times day  11/17: try to decrease steroids in AM  11/18 Duoneb. intubated for acute respiratory distress with hypoxia  11/19 KIARA. intubated and on ventilator support  11/20: cont vent support: start weaning as tolerated!  11/21: would decrease  his steroids to 30 mg a day from tomorrow

## 2017-11-22 DIAGNOSIS — J96.20 ACUTE AND CHRONIC RESPIRATORY FAILURE, UNSPECIFIED WHETHER WITH HYPOXIA OR HYPERCAPNIA: ICD-10-CM

## 2017-11-22 LAB
BASE EXCESS BLDA CALC-SCNC: 7.6 MMOL/L — SIGNIFICANT CHANGE UP
BASE EXCESS BLDA CALC-SCNC: 8 MMOL/L — SIGNIFICANT CHANGE UP
BUN SERPL-MCNC: 18 MG/DL — SIGNIFICANT CHANGE UP (ref 7–23)
CALCIUM SERPL-MCNC: 8.6 MG/DL — SIGNIFICANT CHANGE UP (ref 8.4–10.5)
CHLORIDE SERPL-SCNC: 93 MMOL/L — LOW (ref 98–107)
CO2 SERPL-SCNC: 34 MMOL/L — HIGH (ref 22–31)
CREAT SERPL-MCNC: 0.64 MG/DL — SIGNIFICANT CHANGE UP (ref 0.5–1.3)
GLUCOSE BLDA-MCNC: 124 MG/DL — HIGH (ref 70–99)
GLUCOSE SERPL-MCNC: 173 MG/DL — HIGH (ref 70–99)
HCO3 BLDA-SCNC: 31 MMOL/L — HIGH (ref 22–26)
HCO3 BLDA-SCNC: 32 MMOL/L — HIGH (ref 22–26)
HCT VFR BLD CALC: 24.3 % — LOW (ref 39–50)
HCT VFR BLDA CALC: 22.8 % — LOW (ref 39–51)
HGB BLD-MCNC: 7.7 G/DL — LOW (ref 13–17)
HGB BLDA-MCNC: 7.3 G/DL — LOW (ref 13–17)
MAGNESIUM SERPL-MCNC: 1.9 MG/DL — SIGNIFICANT CHANGE UP (ref 1.6–2.6)
MCHC RBC-ENTMCNC: 30.7 PG — SIGNIFICANT CHANGE UP (ref 27–34)
MCHC RBC-ENTMCNC: 31.7 % — LOW (ref 32–36)
MCV RBC AUTO: 96.8 FL — SIGNIFICANT CHANGE UP (ref 80–100)
NRBC # FLD: 0 — SIGNIFICANT CHANGE UP
PCO2 BLDA: 46 MMHG — SIGNIFICANT CHANGE UP (ref 35–48)
PCO2 BLDA: 53 MMHG — HIGH (ref 35–48)
PH BLDA: 7.41 PH — SIGNIFICANT CHANGE UP (ref 7.35–7.45)
PH BLDA: 7.45 PH — SIGNIFICANT CHANGE UP (ref 7.35–7.45)
PHOSPHATE SERPL-MCNC: 2.2 MG/DL — LOW (ref 2.5–4.5)
PLATELET # BLD AUTO: 71 K/UL — LOW (ref 150–400)
PMV BLD: 13.5 FL — HIGH (ref 7–13)
PO2 BLDA: 177 MMHG — HIGH (ref 83–108)
PO2 BLDA: 207 MMHG — HIGH (ref 83–108)
POTASSIUM BLDA-SCNC: 3.4 MMOL/L — SIGNIFICANT CHANGE UP (ref 3.4–4.5)
POTASSIUM SERPL-MCNC: 4.4 MMOL/L — SIGNIFICANT CHANGE UP (ref 3.5–5.3)
POTASSIUM SERPL-SCNC: 4.4 MMOL/L — SIGNIFICANT CHANGE UP (ref 3.5–5.3)
RBC # BLD: 2.51 M/UL — LOW (ref 4.2–5.8)
RBC # FLD: 14.7 % — HIGH (ref 10.3–14.5)
SAO2 % BLDA: 100 % — HIGH (ref 95–99)
SAO2 % BLDA: 99.8 % — HIGH (ref 95–99)
SODIUM BLDA-SCNC: 133 MMOL/L — LOW (ref 136–146)
SODIUM SERPL-SCNC: 138 MMOL/L — SIGNIFICANT CHANGE UP (ref 135–145)
SPECIMEN SOURCE: SIGNIFICANT CHANGE UP
WBC # BLD: 8.16 K/UL — SIGNIFICANT CHANGE UP (ref 3.8–10.5)
WBC # FLD AUTO: 8.16 K/UL — SIGNIFICANT CHANGE UP (ref 3.8–10.5)

## 2017-11-22 PROCEDURE — 99232 SBSQ HOSP IP/OBS MODERATE 35: CPT

## 2017-11-22 PROCEDURE — 71010: CPT | Mod: 26

## 2017-11-22 PROCEDURE — 31500 INSERT EMERGENCY AIRWAY: CPT

## 2017-11-22 PROCEDURE — 93308 TTE F-UP OR LMTD: CPT | Mod: 26

## 2017-11-22 PROCEDURE — 99291 CRITICAL CARE FIRST HOUR: CPT | Mod: 25

## 2017-11-22 PROCEDURE — 76604 US EXAM CHEST: CPT | Mod: 26

## 2017-11-22 RX ORDER — CISATRACURIUM BESYLATE 2 MG/ML
20 INJECTION INTRAVENOUS ONCE
Qty: 0 | Refills: 0 | Status: COMPLETED | OUTPATIENT
Start: 2017-11-22 | End: 2017-11-22

## 2017-11-22 RX ORDER — MIDAZOLAM HYDROCHLORIDE 1 MG/ML
10 INJECTION, SOLUTION INTRAMUSCULAR; INTRAVENOUS ONCE
Qty: 0 | Refills: 0 | Status: DISCONTINUED | OUTPATIENT
Start: 2017-11-22 | End: 2017-11-22

## 2017-11-22 RX ORDER — HEPARIN SODIUM 5000 [USP'U]/ML
5000 INJECTION INTRAVENOUS; SUBCUTANEOUS EVERY 12 HOURS
Qty: 0 | Refills: 0 | Status: DISCONTINUED | OUTPATIENT
Start: 2017-11-22 | End: 2017-11-22

## 2017-11-22 RX ORDER — PROPOFOL 10 MG/ML
5 INJECTION, EMULSION INTRAVENOUS ONCE
Qty: 0 | Refills: 0 | Status: COMPLETED | OUTPATIENT
Start: 2017-11-22 | End: 2017-11-22

## 2017-11-22 RX ORDER — DEXMEDETOMIDINE HYDROCHLORIDE IN 0.9% SODIUM CHLORIDE 4 UG/ML
0.1 INJECTION INTRAVENOUS
Qty: 200 | Refills: 0 | Status: DISCONTINUED | OUTPATIENT
Start: 2017-11-22 | End: 2017-11-22

## 2017-11-22 RX ORDER — NOREPINEPHRINE BITARTRATE/D5W 8 MG/250ML
0.08 PLASTIC BAG, INJECTION (ML) INTRAVENOUS
Qty: 16 | Refills: 0 | Status: DISCONTINUED | OUTPATIENT
Start: 2017-11-22 | End: 2017-11-22

## 2017-11-22 RX ORDER — MIDAZOLAM HYDROCHLORIDE 1 MG/ML
2 INJECTION, SOLUTION INTRAMUSCULAR; INTRAVENOUS ONCE
Qty: 0 | Refills: 0 | Status: DISCONTINUED | OUTPATIENT
Start: 2017-11-22 | End: 2017-11-22

## 2017-11-22 RX ORDER — MIDAZOLAM HYDROCHLORIDE 1 MG/ML
4 INJECTION, SOLUTION INTRAMUSCULAR; INTRAVENOUS ONCE
Qty: 0 | Refills: 0 | Status: DISCONTINUED | OUTPATIENT
Start: 2017-11-22 | End: 2017-11-22

## 2017-11-22 RX ORDER — FENTANYL CITRATE 50 UG/ML
2.5 INJECTION INTRAVENOUS
Qty: 2500 | Refills: 0 | Status: DISCONTINUED | OUTPATIENT
Start: 2017-11-22 | End: 2017-11-22

## 2017-11-22 RX ORDER — FENTANYL CITRATE 50 UG/ML
3 INJECTION INTRAVENOUS
Qty: 2500 | Refills: 0 | Status: DISCONTINUED | OUTPATIENT
Start: 2017-11-22 | End: 2017-11-24

## 2017-11-22 RX ORDER — PROPOFOL 10 MG/ML
30 INJECTION, EMULSION INTRAVENOUS
Qty: 1000 | Refills: 0 | Status: DISCONTINUED | OUTPATIENT
Start: 2017-11-22 | End: 2017-11-24

## 2017-11-22 RX ADMIN — MIDAZOLAM HYDROCHLORIDE 10 MILLIGRAM(S): 1 INJECTION, SOLUTION INTRAMUSCULAR; INTRAVENOUS at 08:53

## 2017-11-22 RX ADMIN — Medication 1 MILLIGRAM(S): at 11:57

## 2017-11-22 RX ADMIN — PROPOFOL 5 MILLIGRAM(S): 10 INJECTION, EMULSION INTRAVENOUS at 08:54

## 2017-11-22 RX ADMIN — PROPOFOL 13.5 MICROGRAM(S)/KG/MIN: 10 INJECTION, EMULSION INTRAVENOUS at 19:38

## 2017-11-22 RX ADMIN — Medication 325 MILLIGRAM(S): at 11:03

## 2017-11-22 RX ADMIN — Medication 1 APPLICATION(S): at 17:31

## 2017-11-22 RX ADMIN — MIDAZOLAM HYDROCHLORIDE 2 MILLIGRAM(S): 1 INJECTION, SOLUTION INTRAMUSCULAR; INTRAVENOUS at 08:53

## 2017-11-22 RX ADMIN — Medication 5.62 MICROGRAM(S)/KG/MIN: at 09:13

## 2017-11-22 RX ADMIN — MIDAZOLAM HYDROCHLORIDE 4 MILLIGRAM(S): 1 INJECTION, SOLUTION INTRAMUSCULAR; INTRAVENOUS at 19:22

## 2017-11-22 RX ADMIN — Medication 1: at 17:32

## 2017-11-22 RX ADMIN — BUDESONIDE AND FORMOTEROL FUMARATE DIHYDRATE 2 PUFF(S): 160; 4.5 AEROSOL RESPIRATORY (INHALATION) at 20:41

## 2017-11-22 RX ADMIN — ATORVASTATIN CALCIUM 80 MILLIGRAM(S): 80 TABLET, FILM COATED ORAL at 21:18

## 2017-11-22 RX ADMIN — CISATRACURIUM BESYLATE 20 MILLIGRAM(S): 2 INJECTION INTRAVENOUS at 08:53

## 2017-11-22 RX ADMIN — Medication 20 MILLIGRAM(S): at 17:32

## 2017-11-22 RX ADMIN — Medication 2 MILLIGRAM(S): at 08:00

## 2017-11-22 RX ADMIN — Medication 20 MILLIGRAM(S): at 06:31

## 2017-11-22 RX ADMIN — CHLORHEXIDINE GLUCONATE 15 MILLILITER(S): 213 SOLUTION TOPICAL at 05:10

## 2017-11-22 RX ADMIN — PROPOFOL 13.5 MICROGRAM(S)/KG/MIN: 10 INJECTION, EMULSION INTRAVENOUS at 09:12

## 2017-11-22 RX ADMIN — SENNA PLUS 5 MILLILITER(S): 8.6 TABLET ORAL at 14:33

## 2017-11-22 RX ADMIN — Medication 1 APPLICATION(S): at 05:21

## 2017-11-22 RX ADMIN — CHLORHEXIDINE GLUCONATE 1 APPLICATION(S): 213 SOLUTION TOPICAL at 07:35

## 2017-11-22 RX ADMIN — VORICONAZOLE 125 MILLIGRAM(S): 10 INJECTION, POWDER, LYOPHILIZED, FOR SOLUTION INTRAVENOUS at 22:27

## 2017-11-22 RX ADMIN — Medication 1 PUFF(S): at 15:47

## 2017-11-22 RX ADMIN — POLYETHYLENE GLYCOL 3350 17 GRAM(S): 17 POWDER, FOR SOLUTION ORAL at 21:17

## 2017-11-22 RX ADMIN — FENTANYL CITRATE 18.75 MICROGRAM(S)/KG/HR: 50 INJECTION INTRAVENOUS at 09:12

## 2017-11-22 RX ADMIN — Medication 1 PUFF(S): at 04:05

## 2017-11-22 RX ADMIN — FENTANYL CITRATE 22.5 MICROGRAM(S)/KG/HR: 50 INJECTION INTRAVENOUS at 19:39

## 2017-11-22 RX ADMIN — BUDESONIDE AND FORMOTEROL FUMARATE DIHYDRATE 2 PUFF(S): 160; 4.5 AEROSOL RESPIRATORY (INHALATION) at 09:32

## 2017-11-22 RX ADMIN — Medication 500 MILLIGRAM(S): at 11:03

## 2017-11-22 RX ADMIN — Medication 1: at 23:58

## 2017-11-22 RX ADMIN — Medication 1 PUFF(S): at 20:40

## 2017-11-22 RX ADMIN — VORICONAZOLE 125 MILLIGRAM(S): 10 INJECTION, POWDER, LYOPHILIZED, FOR SOLUTION INTRAVENOUS at 11:56

## 2017-11-22 RX ADMIN — SENNA PLUS 5 MILLILITER(S): 8.6 TABLET ORAL at 21:17

## 2017-11-22 RX ADMIN — SENNA PLUS 5 MILLILITER(S): 8.6 TABLET ORAL at 05:10

## 2017-11-22 RX ADMIN — Medication 325 MILLIGRAM(S): at 17:31

## 2017-11-22 RX ADMIN — FENTANYL CITRATE 3 MICROGRAM(S)/KG/HR: 50 INJECTION INTRAVENOUS at 19:39

## 2017-11-22 RX ADMIN — Medication 500 MILLIGRAM(S): at 17:31

## 2017-11-22 RX ADMIN — Medication 63.75 MILLIMOLE(S): at 09:38

## 2017-11-22 RX ADMIN — Medication 1 PUFF(S): at 09:32

## 2017-11-22 RX ADMIN — ALBUTEROL 2 PUFF(S): 90 AEROSOL, METERED ORAL at 09:33

## 2017-11-22 NOTE — PROGRESS NOTE ADULT - PROBLEM SELECTOR PLAN 6
s/p pleurodesis.  11/10 s/p pleurodesis. Management defer to CTS  11/11 no intervention for now. per CTS.  11/13: has mod pneumothorax stable: defer to thoracic for any intervention!  11/12 no intervention planned. bronchoscopy in future per CTS  11/14: Pt still has left sided pneumothorax: s/p blood patch as well as the talc pleurodesis on the left side: Hard to do any other intervention : would defer to thoracic surgical team!!  11/18 management per CTS. not plan for Chest tube at this moment.  11/19 stable radiographically and clinically. Management defer to CTS  11/22: Has this air space in the left lower lobe following DAVID lobectomy: ? PTX, empty space ? any intervention would be difficult: would defer to ct surgery!!

## 2017-11-22 NOTE — PROGRESS NOTE ADULT - PROBLEM SELECTOR PLAN 2
? 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 ot from bronchiectasis? eliquis have been dced: needs card to se: In addition, he may need IR help with embolization, it his hemoptysis increases: He has had pleurodesis on the left side.  11/10 quantify hemoptysis. no AC. stable  11/11 no more hemoptysis. off AC  11/12 resolved. off AC  11/13: today had hemoptysis again----->localize site---> IR embolization: left a message for thoracic team  11/14: DW Dr Caldera: for repeat bronchoscopy to localize the site of bleeding and need IR help to embolize the culprit vessel!  11/15: started on broad spectrum antibiotics and for bronchoscopy today  11/16: s/p IR emobolization done: monitor for more hemoptysis  11/17: s/p bronchsocopy: reportedly old blood seen in airways!  11/18 s/p bronchoscopy in ICU via ETT. old clot. no active bleeding  11/19 stable. no active bleeding per recent bronchoscopy  11/20; off AC at this time  11/22: resolved: remained off ac

## 2017-11-22 NOTE — PROGRESS NOTE ADULT - SUBJECTIVE AND OBJECTIVE BOX
Follow Up:      Inverval History/ROS:Patient is a 47y old  Male who presents with a chief complaint of hemoptysis (09 Nov 2017 00:32)    Tx to MICU    Afebrile.     Intubated/ pressors/ sedated.       Allergies    No Known Allergies    Intolerances        ANTIMICROBIALS:  voriconazole IVPB 300 every 12 hours      OTHER MEDS:  acetaminophen  IVPB. 1000 milliGRAM(s) IV Intermittent once PRN  ALBUTerol    90 MICROgram(s) HFA Inhaler 2 Puff(s) Inhalation every 6 hours PRN  atorvastatin 80 milliGRAM(s) Oral at bedtime  buDESOnide 160 MICROgram(s)/formoterol 4.5 MICROgram(s) Inhaler 2 Puff(s) Inhalation two times a day  chlorhexidine 0.12% Liquid 15 milliLiter(s) Swish and Spit two times a day  chlorhexidine 4% Liquid 1 Application(s) Topical daily  fentaNYL   Infusion 2.5 MICROgram(s)/kG/Hr IV Continuous <Continuous>  ferrous    sulfate 325 milliGRAM(s) Oral three times a day with meals  folic acid 1 milliGRAM(s) Oral daily  heparin  Injectable 5000 Unit(s) SubCutaneous every 12 hours  insulin lispro (HumaLOG) corrective regimen sliding scale   SubCutaneous every 6 hours  ipratropium 17 MICROgram(s) HFA Inhaler 1 Puff(s) Inhalation every 6 hours  methylPREDNISolone sodium succinate Injectable 20 milliGRAM(s) IV Push every 12 hours  norepinephrine Infusion 0.08 MICROgram(s)/kG/Min IV Continuous <Continuous>  pantoprazole  Injectable 40 milliGRAM(s) IV Push daily  petrolatum Ophthalmic Ointment 1 Application(s) Both EYES two times a day  polyethylene glycol 3350 17 Gram(s) Oral at bedtime  propofol Infusion 30 MICROgram(s)/kG/Min IV Continuous <Continuous>  senna Syrup 5 milliLiter(s) Oral three times a day  valproic  acid Syrup 500 milliGRAM(s) Oral two times a day      Vital Signs Last 24 Hrs  T(C): 36.9 (22 Nov 2017 08:00), Max: 36.9 (22 Nov 2017 08:00)  T(F): 98.4 (22 Nov 2017 08:00), Max: 98.4 (22 Nov 2017 08:00)  HR: 60 (22 Nov 2017 11:02) (54 - 148)  BP: 130/88 (22 Nov 2017 10:00) (79/50 - 173/112)  BP(mean): 98 (22 Nov 2017 10:00) (56 - 126)  RR: 12 (22 Nov 2017 10:55) (12 - 27)  SpO2: 100% (22 Nov 2017 11:02) (97% - 100%)    PHYSICAL EXAM:  General: [ ] non-toxic  HEAD/EYES: [ ] PERRL [x ] white sclera [ ] icterus  ENT:  [ ] normal [x ] supple [ ] thrush [ ] pharyngeal exudate  Cardiovascular:   [ ] murmur [x ] normal [ ] PPM/AICD  Respiratory:  [x ] clear to ausculation bilaterally  GI:  [ x] soft, non-tender, normal bowel sounds  :  [ ] mock [ ] no CVA tenderness   Musculoskeletal:  [ ] no synovitis  Neurologic:    Skin:  [x ] no rash  Lymph: [ ] no lymphadenopathy  Psychiatric:  [ ] appropriate affect [ ] alert & oriented  Lines:  [x ] no phlebitis [ ] central line                                7.7    8.16  )-----------( 71       ( 22 Nov 2017 04:00 )             24.3       11-22    138  |  93<L>  |  18  ----------------------------<  173<H>  4.4   |  34<H>  |  0.64    Ca    8.6      22 Nov 2017 04:00  Phos  2.2     11-22  Mg     1.9     11-22    TPro  5.7<L>  /  Alb  3.0<L>  /  TBili  < 0.2<L>  /  DBili  x   /  AST  9   /  ALT  9   /  AlkPhos  58  11-21          MICROBIOLOGY:Culture - Respiratory:   NO GROWTH - PRELIMINARY RESULTS  NRF^Normal Respiratory Katherine  QUANTITY OF GROWTH: RARE (11-15-17 @ 16:22)      RADIOLOGY:

## 2017-11-22 NOTE — PROGRESS NOTE ADULT - PROBLEM SELECTOR PLAN 4
on prednisone.  11/10 continue steroid  11/11 on steroid  11/12 on oral steroid  11/13: cont oral steroids!!  11/14: Has pretty poor lungs secondary to steroids: Cont current therapy!!  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

## 2017-11-22 NOTE — PROGRESS NOTE ADULT - ASSESSMENT
47M with hx of sarcoidosis, started on Eqliuis for cerebrovascular infarct with PFO on echo and paroxysmal afib in September c/b recurrent hemoptysis (Eliquis d/c'd on 9/20, then restarted between 10/23-27 after hemoptysis improved), d/c'd to rehab then returned 11/9 for recurrent hemoptysis requiring intubation and CTICU admission for management of hemoptysis including IR embolization (11/16) and serial bronch (11/17 and 11/20) in CTICU with no active bleeding visualized, now remains intubated with difficulty weaning vent as during CPAP trials pt fails to initiate breaths, unclear etiology. Transferred to MICU from CTICU yesterday for continued vent management and attempted weaning.     # Neuro:  -C/w pain control, on propofol gtt at 30mcg/kg/min  -Fentanyl gtt for pain control    #Cardiovascular:  -Currently holding BP off pressors x 5 hours, though has been on phenylephrine infusion (0.25-1 mcg/kg/min)    #Respiratory:  -Continues to fail CPAP trials as does not initiate breath; was receiving nimbex infusion (d/c'd 11/19). Inability to initiate breath possibly related to recent paralytics vs critical illness myopathy vs diminished respiratory drive  -Continue bronchodilators and pulmonary toilet  -CT Surgery to discuss with transplant team at [place] whether pt is possible candidate for lung transplant  -Continued L apex airspace; per CT surgery continue to monitor, no intervention at this time    #GI:  -c/w OGT feeds  -GI ppx (protonix)    #Renal:  -Monitor I's and O's    #Heme:  -H/H stable (7.7/24.3); baseline Hb 9-10    #ID:  -Aspergilloma dx 9/24/17, on voriconazole x 6 weeks per ID recommendations    #Endocrine:   -On solu-medtrol 20mg q12 for sarcoidosis treatment  -c/w accu-checks with ISS coverage  -repeat electrolytes PRN    #DVT PPX: SCDs, consider restarting HSQ given no active bleeding seen on most recent bronchs 47M with hx of sarcoidosis, started on Eqliuis for cerebrovascular infarct with PFO on echo and paroxysmal afib in September c/b recurrent hemoptysis (Eliquis d/c'd on 9/20, then restarted between 10/23-27 after hemoptysis improved), d/c'd to rehab then returned 11/9 for recurrent hemoptysis requiring intubation and CTICU admission for management of hemoptysis including IR embolization (11/16) and serial bronch (11/17 and 11/20) in CTICU with no active bleeding visualized, now remains intubated with difficulty weaning vent as during CPAP trials pt fails to initiate breaths, unclear etiology. Transferred to MICU from CTICU yesterday for continued vent management and attempted weaning.     # Neuro:  -C/w pain control, on propofol gtt at 30mcg/kg/min  -Fentanyl gtt for pain control    #Cardiovascular:  -Currently holding BP off pressors x 5 hours, though has been on phenylephrine infusion (0.25-1 mcg/kg/min)    #Respiratory:  -Continues to fail CPAP trials as does not initiate breath; was receiving nimbex infusion (d/c'd 11/19). Inability to initiate breath possibly related to recent paralytics vs critical illness myopathy vs diminished respiratory drive  -Continue bronchodilators and pulmonary toilet  -CT Surgery to discuss with transplant team at [place] whether pt is possible candidate for lung transplant  -Continued L apex airspace; per CT surgery continue to monitor, no intervention at this time    #GI:  -c/w OGT feeds  -GI ppx (protonix)    #Renal:  -Monitor I's and O's    #Heme:  -H/H stable (7.7/24.3); baseline Hb 9-10    #ID:  -Aspergilloma dx 9/24/17, on voriconazole x 6 weeks per ID recommendations    #Endocrine:   -On solu-medtrol 20mg q12 for sarcoidosis treatment  -c/w accu-checks with ISS coverage  -repeat electrolytes PRN    #DVT PPX: SCDs, consider restarting HSQ given no active bleeding seen on most recent bronchs    RONAL Cantu, PGY3  [facility]  [id number]

## 2017-11-22 NOTE — CHART NOTE - NSCHARTNOTEFT_GEN_A_CORE
: Carola Miramontes R3, Dr. Medina    INDICATION: hypoxic respiratory failure    PROCEDURE:  [ x ] LIMITED ECHO  [  x ] LIMITED CHEST  [ ] LIMITED RETROPERITONEAL  [ ] LIMITED ABDOMINAL  [ ] LIMITED DVT  [ ] NEEDLE GUIDANCE VASCULAR  [ ] NEEDLE GUIDANCE THORACENTESIS  [ ] NEEDLE GUIDANCE PARACENTESIS  [ ] NEEDLE GUIDANCE PERICARDIOCENTESIS  [ ] OTHER    FINDINGS:  Limited Chest  B lines at R anterior lung fields.  No pleural effusions. No pleural sliding visible at L anterior lung field. Scattered B lines at posterior basal L lung fields. R posterior lung field consolidation.     Limited Echo:   RV appears similar in size to LV. Normal EF. Normal wall motion. No pericardial effusion.     INTERPRETATION:  Bilateral B lines, R lung fields more than L lung fields. RV appears similar in size to LV. No pleural or pericardial effusions.   R posterior lung field consolidations.

## 2017-11-22 NOTE — PROGRESS NOTE ADULT - SUBJECTIVE AND OBJECTIVE BOX
Patient is a 47y old  Male who presents with a chief complaint of hemoptysis (09 Nov 2017 00:32)  transferred to MICU   Extubated and had to be intubated again quickly!  currently sedated    Any change in ROS:     MEDICATIONS  (STANDING):  atorvastatin 80 milliGRAM(s) Oral at bedtime  buDESOnide 160 MICROgram(s)/formoterol 4.5 MICROgram(s) Inhaler 2 Puff(s) Inhalation two times a day  chlorhexidine 0.12% Liquid 15 milliLiter(s) Swish and Spit two times a day  chlorhexidine 4% Liquid 1 Application(s) Topical daily  fentaNYL   Infusion 2.5 MICROgram(s)/kG/Hr (18.75 mL/Hr) IV Continuous <Continuous>  ferrous    sulfate 325 milliGRAM(s) Oral three times a day with meals  folic acid 1 milliGRAM(s) Oral daily  insulin lispro (HumaLOG) corrective regimen sliding scale   SubCutaneous every 6 hours  ipratropium 17 MICROgram(s) HFA Inhaler 1 Puff(s) Inhalation every 6 hours  methylPREDNISolone sodium succinate Injectable 20 milliGRAM(s) IV Push every 12 hours  norepinephrine Infusion 0.08 MICROgram(s)/kG/Min (5.625 mL/Hr) IV Continuous <Continuous>  petrolatum Ophthalmic Ointment 1 Application(s) Both EYES two times a day  polyethylene glycol 3350 17 Gram(s) Oral at bedtime  propofol Infusion 30 MICROgram(s)/kG/Min (13.5 mL/Hr) IV Continuous <Continuous>  senna Syrup 5 milliLiter(s) Oral three times a day  valproic  acid Syrup 500 milliGRAM(s) Oral two times a day  voriconazole IVPB 300 milliGRAM(s) IV Intermittent every 12 hours    MEDICATIONS  (PRN):  acetaminophen  IVPB. 1000 milliGRAM(s) IV Intermittent once PRN Moderate Pain (4 - 6)  ALBUTerol    90 MICROgram(s) HFA Inhaler 2 Puff(s) Inhalation every 6 hours PRN Shortness of Breath and/or Wheezing    Vital Signs Last 24 Hrs  T(C): 36.9 (22 Nov 2017 08:00), Max: 36.9 (22 Nov 2017 08:00)  T(F): 98.4 (22 Nov 2017 08:00), Max: 98.4 (22 Nov 2017 08:00)  HR: 56 (22 Nov 2017 12:15) (54 - 148)  BP: 143/90 (22 Nov 2017 12:05) (79/50 - 173/112)  BP(mean): 103 (22 Nov 2017 12:05) (56 - 126)  RR: 12 (22 Nov 2017 12:15) (12 - 27)  SpO2: 100% (22 Nov 2017 12:15) (97% - 100%)  Mode: AC/ CMV (Assist Control/ Continuous Mandatory Ventilation)  RR (machine): 12  TV (machine): 450  FiO2: 40  PEEP: 5  MAP: 11  PIP: 38    I&O's Summary    21 Nov 2017 07:01  -  22 Nov 2017 07:00  --------------------------------------------------------  IN: 2991 mL / OUT: 1780 mL / NET: 1211 mL    22 Nov 2017 07:01  -  22 Nov 2017 12:32  --------------------------------------------------------  IN: 363.6 mL / OUT: 125 mL / NET: 238.6 mL          Physical Exam:   GENERAL: NAD, well-groomed, well-developed  HEENT: GIOVANI/   Atraumatic, Normocephalic  ENMT: No tonsillar erythema, exudates, or enlargement; Moist mucous membranes, Good dentition, No lesions  NECK: Supple, No JVD, Normal thyroid  CHEST/LUNG: coarse rhonchi++  CVS: Regular rate and rhythm; No murmurs, rubs, or gallops  GI: : Soft, Nontender, Nondistended; Bowel sounds present  NERVOUS SYSTEM: sedated  EXTREMITIES:  2+ Peripheral Pulses, No clubbing, cyanosis, or edema  LYMPH: No lymphadenopathy noted  SKIN: No rashes or lesions  ENDOCRINOLOGY: No Thyromegaly  PSYCH: sedated    Labs:  ABG - ( 22 Nov 2017 08:50 )  pH: 7.41  /  pCO2: 53    /  pO2: 207   / HCO3: 32    / Base Excess: 8.0   /  SaO2: 100                            7.7    8.16  )-----------( 71       ( 22 Nov 2017 04:00 )             24.3                         8.1    9.30  )-----------( 57       ( 21 Nov 2017 04:00 )             25.0                         8.6    11.77 )-----------( 82       ( 20 Nov 2017 04:00 )             26.1                         7.8    8.06  )-----------( 61       ( 19 Nov 2017 07:20 )             24.7                         7.8    8.04  )-----------( 61       ( 19 Nov 2017 04:45 )             23.1     11-22    138  |  93<L>  |  18  ----------------------------<  173<H>  4.4   |  34<H>  |  0.64  11-21    139  |  96<L>  |  18  ----------------------------<  145<H>  4.6   |  35<H>  |  0.70  11-20    137  |  97<L>  |  17  ----------------------------<  158<H>  4.8   |  31  |  0.75  11-19    135  |  95<L>  |  16  ----------------------------<  177<H>  3.9   |  30  |  0.62    Ca    8.6      22 Nov 2017 04:00  Ca    8.5      21 Nov 2017 04:00  Phos  2.2     11-22  Mg     1.9     11-22    TPro  5.7<L>  /  Alb  3.0<L>  /  TBili  < 0.2<L>  /  DBili  x   /  AST  9   /  ALT  9   /  AlkPhos  58  11-21  TPro  5.1<L>  /  Alb  2.6<L>  /  TBili  0.2  /  DBili  x   /  AST  14  /  ALT  11  /  AlkPhos  52  11-19    CAPILLARY BLOOD GLUCOSE      POCT Blood Glucose.: 118 mg/dL (22 Nov 2017 11:53)  POCT Blood Glucose.: 141 mg/dL (22 Nov 2017 05:09)  POCT Blood Glucose.: 153 mg/dL (21 Nov 2017 23:19)  POCT Blood Glucose.: 178 mg/dL (21 Nov 2017 17:34)      LIVER FUNCTIONS - ( 21 Nov 2017 04:00 )  Alb: 3.0 g/dL / Pro: 5.7 g/dL / ALK PHOS: 58 u/L / ALT: 9 u/L / AST: 9 u/L / GGT: x               Cultures:           Wound culture:                11-15 @ 16:22  Organism --  Culture w/ gram stain --  Specimen Source BRONCHIAL LAVAGE      Abscess culture:             11-15 @ 16:22  Organism --  Gram Stain --  Specimen Source BRONCHIAL LAVAGE        Tissue culture:           11-15 @ 16:22  Organism --  Gram Stain --  Specimen Source BRONCHIAL LAVAGE      Body Fluid Smear & Culture:                        11-15 @ 16:22  AFB Smear  --  Culture Acid Fast Body Fluid w/ Smear  --  Culture Acid Fast Smear Concentrated     AFB SMEAR= NO ACID FAST BACILLI SEEN    Culture Results:     --  Specimen Source BRONCHIAL LAVAGE        < from: Xray Chest 1 View AP -PORTABLE-Routine (11.21.17 @ 07:07) >            IMPRESSION:  No significant interval change.    Lines and tubes unchanged. Note that the side port of the enteric tube is   at the level of the distal esophagus. Clinical correlation will determine   the need for repositioning.    Stable left apical pneumothorax, bilateral lung opacities, and small   pleural effusions.    Discussed with Dr. Kim with read back at 9:07 AM on November 21, 2017   by Dr. Fields.                  KEERTHI FIELDS M.D., ATTENDING RADIOLOGIST  This document has been electronically signed. Nov21 2017  9:08AM    < end of copied text >        Studies  Chest X-RAY  CT SCAN Chest   Venous Dopplers: LE:   CT Abdomen  Others

## 2017-11-22 NOTE — PROGRESS NOTE ADULT - PROBLEM SELECTOR PLAN 3
Cont BD with inhaled steroids and oral steroids  11/114: Pts wheezing has significantly improved: he will need long term steroids as he starts wheezing once his steroids are dced!  11/15: Wheezing has increased: started on IV steroids high dose by thoracic team: Would suggest to decrease to 20 mg three times a day !  11/16: decrease steroids today to 20 mg three times day  11/17: try to decrease steroids in AM  11/18 Duoneb. intubated for acute respiratory distress with hypoxia  11/19 KIARA. intubated and on ventilator support  11/20: cont vent support: start weaning as tolerated!  11/21: would decrease  his steroids to 30 mg a day from tomorrow  11/22: DECREASE STEROIDS: HE WHEEZES WHEN THE STEROIDS ARE TAPERED: BUT NEED TO BE LOWERED NOW AND WATCH!!

## 2017-11-22 NOTE — PROGRESS NOTE ADULT - ASSESSMENT
47 year old with sarcoidosis and COPD presents after recent andree surgery with recurrent hemoptysis.     This is liklely multifactorial- Cystic lung changes, sarcoid, ? superimposed infection many all be contributing.     His CT has peribronchial changes.    Completed bacterial course of coverage.    Consider continuing voriconazole pending fungal culture.  Fungal cultures can be lower yield- may be best to treat empirically for 6 more weeks.     Unclear that an ongoing fungal process is involved, but given his critic state- empiric coverage is a consideration.

## 2017-11-22 NOTE — PROGRESS NOTE ADULT - PROBLEM SELECTOR PLAN 1
intubated: try to titrate as tolerated!  11/18 on ventilator support. Pt is sedated and paralyzed. defer ventilator Management/ weaning per CTICU team.  11/19 still sedated and paralyzed. Plan to wean off Nimbex today per Primary RN. ABGs reviewed.  11/20:ff paralyzing agent! But sedated !!  11/21: awake and alert: responding to simple commands: weaning trials  11/22: currently sedated: ? trach now?

## 2017-11-22 NOTE — PROCEDURE NOTE - NSTRACHPOSTINTU_RESP_A_CORE
Appropriate capnography/Breath sounds bilateral/Chest excursion noted/Chest X-Ray/Breath sounds equal/Positive end tidal Co2 noted

## 2017-11-22 NOTE — PROGRESS NOTE ADULT - SUBJECTIVE AND OBJECTIVE BOX
48 yo male w/ PMHx sarcoidosis and aspergilloma s/p LULobectomy who was recently readmitted 2/2 hemoptysis and eventually progressed to respiratory failure currently transferred to MICU for advanced pulmonary care.   11/22 extubation trial failed, currently intubated and sedated/vented.     PMHx/PSHx:PAST MEDICAL & SURGICAL HISTORY:  History of pneumothorax: History of 3 prior pneumonthoracies.  COPD (chronic obstructive pulmonary disease)  Asthma  Hypertension  Sarcoidosis  History of thoracotomy: 9/24/17 Left thoracotomy, Left upper lobectomy   9/25/17 Reop left thoracotomy, evacuation of left hemothorax    Vital Signs:  Vital Signs Last 24 Hrs  T(C): 36.6 (11-22-17 @ 16:00), Max: 36.9 (11-22-17 @ 08:00)  T(F): 97.9 (11-22-17 @ 16:00), Max: 98.4 (11-22-17 @ 08:00)  HR: 97 (11-22-17 @ 17:00) (54 - 148)  BP: 143/90 (11-22-17 @ 12:05) (79/50 - 173/112)  RR: 16 (11-22-17 @ 17:00) (12 - 27)  SpO2: 100% (11-22-17 @ 17:00) (97% - 100%) on (O2)    Telemetry/Alarms: NSR on monitor  Gen: Sedated   Neuro: Sedated   Head/Neck: Intubated, normocephalic, tracheal midline   Pulm: b/l transmitted ronchi, otherwise clear  Card: s1/s2   Abd: +bs, soft  LE: +1 pedal edema   Skin: Generalized positional edema     CT: None     Relevant labs, radiology and Medications reviewed                        7.7    8.16  )-----------( 71       ( 22 Nov 2017 04:00 )             24.3     11-22    138  |  93<L>  |  18  ----------------------------<  173<H>  4.4   |  34<H>  |  0.64    Ca    8.6      22 Nov 2017 04:00  Phos  2.2     11-22  Mg     1.9     11-22    TPro  5.7<L>  /  Alb  3.0<L>  /  TBili  < 0.2<L>  /  DBili  x   /  AST  9   /  ALT  9   /  AlkPhos  58  11-21      Pertinent Physical Exam  I&O's Summary    21 Nov 2017 07:01  -  22 Nov 2017 07:00  --------------------------------------------------------  IN: 2991 mL / OUT: 1780 mL / NET: 1211 mL    22 Nov 2017 07:01  -  22 Nov 2017 17:45  --------------------------------------------------------  IN: 1267.9 mL / OUT: 380 mL / NET: 887.9 mL        Assessment  47y Male  w/ PAST MEDICAL & SURGICAL HISTORY:  History of pneumothorax: History of 3 prior pneumonthoracies.  COPD (chronic obstructive pulmonary disease)  Asthma  Hypertension  Sarcoidosis  History of thoracotomy: 9/24/17 Left thoracotomy, Left upper lobectomy   9/25/17 Reop left thoracotomy, evacuation of left hemothorax   Patient is a 47y old  Male who presents with a chief complaint of hemoptysis (09 Nov 2017 00:32)  .  On (Date), patient underwent Bronchoscopy  Bronchoscopy  Bronchoscopy with bronchoalveolar lavage  .     Postoperative course/issues:                                                                                                        PLAN  - Continue care as per MICU   - Consult as needed if trach is needed  - Will continue to follow                                                                                                        Contact spectra: [id number]

## 2017-11-22 NOTE — PROGRESS NOTE ADULT - SUBJECTIVE AND OBJECTIVE BOX
CHIEF COMPLAINT: Hemoptysis    Interval Events: transferred to MICU yesterday for continued care and vent weaning    REVIEW OF SYSTEMS:  Constitutional: [ ] negative [ ] fevers [ ] chills [ ] weight loss [ ] weight gain  HEENT: [ ] negative [ ] dry eyes [ ] eye irritation [ ] postnasal drip [ ] nasal congestion  CV: [ ] negative  [ ] chest pain [ ] orthopnea [ ] palpitations [ ] murmur  Resp: [ ] negative [ ] cough [ ] shortness of breath [ ] dyspnea [ ] wheezing [ ] sputum [ ] hemoptysis  GI: [ ] negative [ ] nausea [ ] vomiting [ ] diarrhea [ ] constipation [ ] abd pain [ ] dysphagia   : [ ] negative [ ] dysuria [ ] nocturia [ ] hematuria [ ] increased urinary frequency  Musculoskeletal: [ ] negative [ ] back pain [ ] myalgias [ ] arthralgias [ ] fracture  Skin: [ ] negative [ ] rash [ ] itch  Neurological: [ ] negative [ ] headache [ ] dizziness [ ] syncope [ ] weakness [ ] numbness  Psychiatric: [ ] negative [ ] anxiety [ ] depression  Endocrine: [ ] negative [ ] diabetes [ ] thyroid problem  Hematologic/Lymphatic: [ ] negative [ ] anemia [ ] bleeding problem  Allergic/Immunologic: [ ] negative [ ] itchy eyes [ ] nasal discharge [ ] hives [ ] angioedema  [ ] All other systems negative  [ ] Unable to assess ROS because ________    OBJECTIVE:  ICU Vital Signs Last 24 Hrs  T(C): 36.2 (22 Nov 2017 04:00), Max: 36.8 (21 Nov 2017 12:00)  T(F): 97.2 (22 Nov 2017 04:00), Max: 98.3 (21 Nov 2017 12:00)  HR: 85 (22 Nov 2017 06:00) (54 - 125)  BP: 132/83 (22 Nov 2017 06:00) (94/63 - 164/106)  BP(mean): 94 (22 Nov 2017 06:00) (69 - 117)  ABP: 150/88 (22 Nov 2017 06:00) (88/53 - 190/98)  ABP(mean): 107 (22 Nov 2017 06:00) (64 - 126)  RR: 20 (22 Nov 2017 06:00) (13 - 24)  SpO2: 100% (22 Nov 2017 06:00) (96% - 100%)    Mode: AC/ CMV (Assist Control/ Continuous Mandatory Ventilation), RR (machine): 20, TV (machine): 450, FiO2: 40, PEEP: 5, MAP: 17, PIP: 38    11-20 @ 07:01  - 11-21 @ 07:00  --------------------------------------------------------  IN: 2983.7 mL / OUT: 1805 mL / NET: 1178.7 mL    11-21 @ 07:01 - 11-22 @ 06:46  --------------------------------------------------------  IN: 2991 mL / OUT: 1780 mL / NET: 1211 mL      CAPILLARY BLOOD GLUCOSE      POCT Blood Glucose.: 141 mg/dL (22 Nov 2017 05:09)      PHYSICAL EXAM:  General:   HEENT:   Lymph Nodes:  Neck:   Respiratory:   Cardiovascular:   Abdomen:   Extremities:   Skin:   Neurological:  Psychiatry:    LINES:    HOSPITAL MEDICATIONS:  Standing Meds:  atorvastatin 80 milliGRAM(s) Oral at bedtime  buDESOnide 160 MICROgram(s)/formoterol 4.5 MICROgram(s) Inhaler 2 Puff(s) Inhalation two times a day  chlorhexidine 0.12% Liquid 15 milliLiter(s) Swish and Spit two times a day  chlorhexidine 4% Liquid 1 Application(s) Topical daily  fentaNYL   Infusion 1 MICROgram(s)/kG/Hr IV Continuous <Continuous>  ferrous    sulfate 325 milliGRAM(s) Oral three times a day with meals  folic acid 1 milliGRAM(s) Oral daily  insulin lispro (HumaLOG) corrective regimen sliding scale   SubCutaneous every 6 hours  ipratropium 17 MICROgram(s) HFA Inhaler 1 Puff(s) Inhalation every 6 hours  methylPREDNISolone sodium succinate Injectable 20 milliGRAM(s) IV Push every 12 hours  pantoprazole  Injectable 40 milliGRAM(s) IV Push daily  petrolatum Ophthalmic Ointment 1 Application(s) Both EYES two times a day  phenylephrine    Infusion 0.1 MICROgram(s)/kG/Min IV Continuous <Continuous>  polyethylene glycol 3350 17 Gram(s) Oral at bedtime  propofol Infusion 30 MICROgram(s)/kG/Min IV Continuous <Continuous>  senna Syrup 5 milliLiter(s) Oral three times a day  valproic  acid Syrup 500 milliGRAM(s) Oral two times a day  voriconazole IVPB 300 milliGRAM(s) IV Intermittent every 12 hours      PRN Meds:  acetaminophen  IVPB. 1000 milliGRAM(s) IV Intermittent once PRN  ALBUTerol    90 MICROgram(s) HFA Inhaler 2 Puff(s) Inhalation every 6 hours PRN      LABS:                        7.7    8.16  )-----------( 71       ( 22 Nov 2017 04:00 )             24.3     Hgb Trend: 7.7<--, 8.1<--, 8.6<--, 7.8<--, 7.8<--  11-22    138  |  93<L>  |  18  ----------------------------<  173<H>  4.4   |  34<H>  |  0.64    Ca    8.6      22 Nov 2017 04:00  Phos  2.2     11-22  Mg     1.9     11-22    TPro  5.7<L>  /  Alb  3.0<L>  /  TBili  < 0.2<L>  /  DBili  x   /  AST  9   /  ALT  9   /  AlkPhos  58  11-21    Creatinine Trend: 0.64<--, 0.70<--, 0.75<--, 0.62<--, 0.81<--, 0.73<--      Arterial Blood Gas:  11-21 @ 04:00  7.39/62/173/34/99.8/11.2  ABG lactate: 1.4        MICROBIOLOGY:     RADIOLOGY:  [ ] Reviewed and interpreted by me    EKG: CHIEF COMPLAINT: Hemoptysis    Interval Events: transferred to MICU yesterday for continued care and vent weaning    REVIEW OF SYSTEMS:  Constitutional: [x ] negative [ ] fevers [ ] chills [ ] weight loss [ ] weight gain  HEENT: [ ] negative [ ] dry eyes [ ] eye irritation [ ] postnasal drip [ ] nasal congestion [x] throat irritation from ETT  CV: [ x] negative  [ ] chest pain [ ] orthopnea [ ] palpitations [ ] murmur  Resp: [x ] negative [ ] cough [ ] shortness of breath [ ] dyspnea [ ] wheezing [ ] sputum [ ] hemoptysis  GI: [ ] negative [x ] nausea [ ] vomiting [ ] diarrhea [ ] constipation [ ] abd pain [ ] dysphagia   : [x ] negative [ ] dysuria [ ] nocturia [ ] hematuria [ ] increased urinary frequency  Musculoskeletal: [ x] negative [ ] back pain [ ] myalgias [ ] arthralgias [ ] fracture  Skin: [ x] negative [ ] rash [ ] itch  Neurological: [ x] negative [ ] headache [ ] dizziness [ ] syncope [ ] weakness [ ] numbness  Psychiatric: [x ] negative [ ] anxiety [ ] depression  Endocrine: [x ] negative [ ] diabetes [ ] thyroid problem  Hematologic/Lymphatic: [x ] negative [ ] anemia [ ] bleeding problem  Allergic/Immunologic: [x ] negative [ ] itchy eyes [ ] nasal discharge [ ] hives [ ] angioedema  [ ] All other systems negative  [ ] Unable to assess ROS because ________    OBJECTIVE:  ICU Vital Signs Last 24 Hrs  T(C): 36.2 (22 Nov 2017 04:00), Max: 36.8 (21 Nov 2017 12:00)  T(F): 97.2 (22 Nov 2017 04:00), Max: 98.3 (21 Nov 2017 12:00)  HR: 85 (22 Nov 2017 06:00) (54 - 125)  BP: 132/83 (22 Nov 2017 06:00) (94/63 - 164/106)  BP(mean): 94 (22 Nov 2017 06:00) (69 - 117)  ABP: 150/88 (22 Nov 2017 06:00) (88/53 - 190/98)  ABP(mean): 107 (22 Nov 2017 06:00) (64 - 126)  RR: 20 (22 Nov 2017 06:00) (13 - 24)  SpO2: 100% (22 Nov 2017 06:00) (96% - 100%)    Mode: AC/ CMV (Assist Control/ Continuous Mandatory Ventilation), RR (machine): 20, TV (machine): 450, FiO2: 40, PEEP: 5, MAP: 17, PIP: 38    11-20 @ 07:01  -  11-21 @ 07:00  --------------------------------------------------------  IN: 2983.7 mL / OUT: 1805 mL / NET: 1178.7 mL    11-21 @ 07:01  -  11-22 @ 06:46  --------------------------------------------------------  IN: 2991 mL / OUT: 1780 mL / NET: 1211 mL      CAPILLARY BLOOD GLUCOSE      POCT Blood Glucose.: 141 mg/dL (22 Nov 2017 05:09)      PHYSICAL EXAM:  General: well appearing, anxious, alert and able to follow commands  HEENT: normal  Lymph Nodes: no adenopathy  Neck: supple  Respiratory: b/l diffuse rhonchi, intubated with ETT in place  Cardiovascular: tachycardic, no MRG  Abdomen: soft, NTND  Extremities: warm, equal pulses  Skin: no rashes or breakdown  Neurological: left sided weakness, moves all extremities  Psychiatry: normal    LINES: L IJ central line, ETT, Pickens catheter    [facility] MEDICATIONS:  Standing Meds:  atorvastatin 80 milliGRAM(s) Oral at bedtime  buDESOnide 160 MICROgram(s)/formoterol 4.5 MICROgram(s) Inhaler 2 Puff(s) Inhalation two times a day  chlorhexidine 0.12% Liquid 15 milliLiter(s) Swish and Spit two times a day  chlorhexidine 4% Liquid 1 Application(s) Topical daily  fentaNYL   Infusion 1 MICROgram(s)/kG/Hr IV Continuous <Continuous>  ferrous    sulfate 325 milliGRAM(s) Oral three times a day with meals  folic acid 1 milliGRAM(s) Oral daily  insulin lispro (HumaLOG) corrective regimen sliding scale   SubCutaneous every 6 hours  ipratropium 17 MICROgram(s) HFA Inhaler 1 Puff(s) Inhalation every 6 hours  methylPREDNISolone sodium succinate Injectable 20 milliGRAM(s) IV Push every 12 hours  pantoprazole  Injectable 40 milliGRAM(s) IV Push daily  petrolatum Ophthalmic Ointment 1 Application(s) Both EYES two times a day  phenylephrine    Infusion 0.1 MICROgram(s)/kG/Min IV Continuous <Continuous>  polyethylene glycol 3350 17 Gram(s) Oral at bedtime  propofol Infusion 30 MICROgram(s)/kG/Min IV Continuous <Continuous>  senna Syrup 5 milliLiter(s) Oral three times a day  valproic  acid Syrup 500 milliGRAM(s) Oral two times a day  voriconazole IVPB 300 milliGRAM(s) IV Intermittent every 12 hours      PRN Meds:  acetaminophen  IVPB. 1000 milliGRAM(s) IV Intermittent once PRN  ALBUTerol    90 MICROgram(s) HFA Inhaler 2 Puff(s) Inhalation every 6 hours PRN      LABS:                        7.7    8.16  )-----------( 71       ( 22 Nov 2017 04:00 )             24.3     Hgb Trend: 7.7<--, 8.1<--, 8.6<--, 7.8<--, 7.8<--  11-22    138  |  93<L>  |  18  ----------------------------<  173<H>  4.4   |  34<H>  |  0.64    Ca    8.6      22 Nov 2017 04:00  Phos  2.2     11-22  Mg     1.9     11-22    TPro  5.7<L>  /  Alb  3.0<L>  /  TBili  < 0.2<L>  /  DBili  x   /  AST  9   /  ALT  9   /  AlkPhos  58  11-21    Creatinine Trend: 0.64<--, 0.70<--, 0.75<--, 0.62<--, 0.81<--, 0.73<--      Arterial Blood Gas:  11-21 @ 04:00  7.39/62/173/34/99.8/11.2  ABG lactate: 1.4        MICROBIOLOGY:     RADIOLOGY:  [ ] Reviewed and interpreted by me    EKG:

## 2017-11-23 LAB
ALBUMIN SERPL ELPH-MCNC: 2.9 G/DL — LOW (ref 3.3–5)
ALP SERPL-CCNC: 61 U/L — SIGNIFICANT CHANGE UP (ref 40–120)
ALT FLD-CCNC: 11 U/L — SIGNIFICANT CHANGE UP (ref 4–41)
AST SERPL-CCNC: 11 U/L — SIGNIFICANT CHANGE UP (ref 4–40)
BASE EXCESS BLDA CALC-SCNC: 14.3 MMOL/L — SIGNIFICANT CHANGE UP
BILIRUB SERPL-MCNC: < 0.2 MG/DL — LOW (ref 0.2–1.2)
BUN SERPL-MCNC: 13 MG/DL — SIGNIFICANT CHANGE UP (ref 7–23)
CALCIUM SERPL-MCNC: 8.2 MG/DL — LOW (ref 8.4–10.5)
CHLORIDE BLDA-SCNC: 93 MMOL/L — LOW (ref 96–108)
CHLORIDE SERPL-SCNC: 93 MMOL/L — LOW (ref 98–107)
CO2 SERPL-SCNC: 40 MMOL/L — HIGH (ref 22–31)
CREAT SERPL-MCNC: 0.56 MG/DL — SIGNIFICANT CHANGE UP (ref 0.5–1.3)
GLUCOSE BLDA-MCNC: 177 MG/DL — HIGH (ref 70–99)
GLUCOSE SERPL-MCNC: 186 MG/DL — HIGH (ref 70–99)
GRAM STN SPT: SIGNIFICANT CHANGE UP
HCO3 BLDA-SCNC: 37 MMOL/L — HIGH (ref 22–26)
HCT VFR BLD CALC: 24.2 % — LOW (ref 39–50)
HCT VFR BLDA CALC: 24.4 % — LOW (ref 39–51)
HGB BLD-MCNC: 7.8 G/DL — LOW (ref 13–17)
HGB BLDA-MCNC: 7.8 G/DL — LOW (ref 13–17)
LACTATE BLDA-SCNC: 1.3 MMOL/L — SIGNIFICANT CHANGE UP (ref 0.5–2)
MAGNESIUM SERPL-MCNC: 2.2 MG/DL — SIGNIFICANT CHANGE UP (ref 1.6–2.6)
MCHC RBC-ENTMCNC: 31.7 PG — SIGNIFICANT CHANGE UP (ref 27–34)
MCHC RBC-ENTMCNC: 32.2 % — SIGNIFICANT CHANGE UP (ref 32–36)
MCV RBC AUTO: 98.4 FL — SIGNIFICANT CHANGE UP (ref 80–100)
NRBC # FLD: 0 — SIGNIFICANT CHANGE UP
PCO2 BLDA: 69 MMHG — HIGH (ref 35–48)
PH BLDA: 7.38 PH — SIGNIFICANT CHANGE UP (ref 7.35–7.45)
PHOSPHATE SERPL-MCNC: 3.4 MG/DL — SIGNIFICANT CHANGE UP (ref 2.5–4.5)
PLATELET # BLD AUTO: 85 K/UL — LOW (ref 150–400)
PMV BLD: 12.8 FL — SIGNIFICANT CHANGE UP (ref 7–13)
PO2 BLDA: 147 MMHG — HIGH (ref 83–108)
POTASSIUM BLDA-SCNC: 4.3 MMOL/L — SIGNIFICANT CHANGE UP (ref 3.4–4.5)
POTASSIUM SERPL-MCNC: 4.6 MMOL/L — SIGNIFICANT CHANGE UP (ref 3.5–5.3)
POTASSIUM SERPL-SCNC: 4.6 MMOL/L — SIGNIFICANT CHANGE UP (ref 3.5–5.3)
PROT SERPL-MCNC: 5.1 G/DL — LOW (ref 6–8.3)
RBC # BLD: 2.46 M/UL — LOW (ref 4.2–5.8)
RBC # FLD: 14.6 % — HIGH (ref 10.3–14.5)
SAO2 % BLDA: 99.6 % — HIGH (ref 95–99)
SODIUM BLDA-SCNC: 132 MMOL/L — LOW (ref 136–146)
SODIUM SERPL-SCNC: 138 MMOL/L — SIGNIFICANT CHANGE UP (ref 135–145)
SPECIMEN SOURCE: SIGNIFICANT CHANGE UP
WBC # BLD: 8.52 K/UL — SIGNIFICANT CHANGE UP (ref 3.8–10.5)
WBC # FLD AUTO: 8.52 K/UL — SIGNIFICANT CHANGE UP (ref 3.8–10.5)

## 2017-11-23 PROCEDURE — 99291 CRITICAL CARE FIRST HOUR: CPT

## 2017-11-23 RX ADMIN — POLYETHYLENE GLYCOL 3350 17 GRAM(S): 17 POWDER, FOR SOLUTION ORAL at 22:06

## 2017-11-23 RX ADMIN — SENNA PLUS 5 MILLILITER(S): 8.6 TABLET ORAL at 05:24

## 2017-11-23 RX ADMIN — Medication 1 PUFF(S): at 11:12

## 2017-11-23 RX ADMIN — SENNA PLUS 5 MILLILITER(S): 8.6 TABLET ORAL at 13:09

## 2017-11-23 RX ADMIN — BUDESONIDE AND FORMOTEROL FUMARATE DIHYDRATE 2 PUFF(S): 160; 4.5 AEROSOL RESPIRATORY (INHALATION) at 20:49

## 2017-11-23 RX ADMIN — CHLORHEXIDINE GLUCONATE 1 APPLICATION(S): 213 SOLUTION TOPICAL at 11:26

## 2017-11-23 RX ADMIN — Medication: at 13:08

## 2017-11-23 RX ADMIN — Medication 500 MILLIGRAM(S): at 17:15

## 2017-11-23 RX ADMIN — Medication 1 APPLICATION(S): at 17:43

## 2017-11-23 RX ADMIN — Medication 325 MILLIGRAM(S): at 13:08

## 2017-11-23 RX ADMIN — SENNA PLUS 5 MILLILITER(S): 8.6 TABLET ORAL at 22:06

## 2017-11-23 RX ADMIN — Medication 500 MILLIGRAM(S): at 05:23

## 2017-11-23 RX ADMIN — Medication 325 MILLIGRAM(S): at 17:15

## 2017-11-23 RX ADMIN — Medication 1: at 17:17

## 2017-11-23 RX ADMIN — BUDESONIDE AND FORMOTEROL FUMARATE DIHYDRATE 2 PUFF(S): 160; 4.5 AEROSOL RESPIRATORY (INHALATION) at 11:13

## 2017-11-23 RX ADMIN — FENTANYL CITRATE 3 MICROGRAM(S)/KG/HR: 50 INJECTION INTRAVENOUS at 02:00

## 2017-11-23 RX ADMIN — Medication 1 MILLIGRAM(S): at 13:08

## 2017-11-23 RX ADMIN — VORICONAZOLE 125 MILLIGRAM(S): 10 INJECTION, POWDER, LYOPHILIZED, FOR SOLUTION INTRAVENOUS at 13:09

## 2017-11-23 RX ADMIN — Medication 325 MILLIGRAM(S): at 08:23

## 2017-11-23 RX ADMIN — FENTANYL CITRATE 22.5 MICROGRAM(S)/KG/HR: 50 INJECTION INTRAVENOUS at 08:23

## 2017-11-23 RX ADMIN — Medication 1: at 05:50

## 2017-11-23 RX ADMIN — Medication 1 PUFF(S): at 03:24

## 2017-11-23 RX ADMIN — Medication 1 PUFF(S): at 16:50

## 2017-11-23 RX ADMIN — PROPOFOL 13.5 MICROGRAM(S)/KG/MIN: 10 INJECTION, EMULSION INTRAVENOUS at 08:23

## 2017-11-23 RX ADMIN — Medication 40 MILLIGRAM(S): at 05:23

## 2017-11-23 RX ADMIN — FENTANYL CITRATE 22.5 MICROGRAM(S)/KG/HR: 50 INJECTION INTRAVENOUS at 00:06

## 2017-11-23 RX ADMIN — Medication 1 APPLICATION(S): at 05:23

## 2017-11-23 RX ADMIN — Medication 1 PUFF(S): at 20:50

## 2017-11-23 NOTE — PROGRESS NOTE ADULT - ASSESSMENT
47M with hx of sarcoidosis, started on Eqliuis for cerebrovascular infarct with PFO on echo and paroxysmal afib in September c/b recurrent hemoptysis (Eliquis d/c'd on 9/20, then restarted between 10/23-27 after hemoptysis improved), d/c'd to rehab then returned 11/9 for recurrent hemoptysis requiring intubation and CTICU admission for management of hemoptysis including IR embolization (11/16) and serial bronch (11/17 and 11/20) in CTICU with no active bleeding visualized, now remains intubated with difficulty weaning vent as during CPAP trials pt fails to initiate breaths, unclear etiology. Transferred to MICU from CTICU for continued vent management and attempted weaning.     # Neuro:  -C/w pain control, on propofol gtt at 30mcg/kg/min  -Fentanyl gtt for pain control    #Cardiovascular:  -Currently holding BP off pressors x 24 hours, though has been on phenylephrine infusion (0.25-1 mcg/kg/min)    #Respiratory:  -Continues to fail CPAP trials as does not initiate breath; was receiving nimbex infusion (d/c'd 11/19). Inability to initiate breath possibly related to recent paralytics vs critical illness myopathy vs diminished respiratory drive  -Failed trial of extubation yesterday due to tachypnea and poor tidal volume, reintubated and found to be auto-peeping, I:E ratio adjusted to allow for full exhalation with improvement  -Continue bronchodilators and pulmonary toilet  -CT Surgery to discuss with transplant team at [facility] whether pt is possible candidate for lung transplant  -Continued L apex airspace; per CT surgery continue to monitor, no intervention at this time  -Will likely need trach in the next few days for further respiratory management.     #GI:  -c/w OGT feeds  -Protonix d/c'd yesterday    #Renal:  -Monitor I's and O's    #Heme:  -H/H stable (7.8/24.2); baseline Hb 9-10    #ID:  -Aspergilloma dx 9/24/17, on voriconazole x 6 weeks per ID recommendations. Recommend continuing voriconazole given hemoptysis recurred after was stopped, possible related to inflammation 2/2 aspergilloma and possible continued infection    #Endocrine:   -c/w accu-checks with ISS coverage  -repeat electrolytes PRN    #DVT PPX: SCDs, holding heparin at this point given low platelets    RONAL Cantu, PGY3  MICU  [id number]

## 2017-11-23 NOTE — PROGRESS NOTE ADULT - SUBJECTIVE AND OBJECTIVE BOX
Patient is a 47y old  Male who presents with a chief complaint of hemoptysis (09 Nov 2017 00:32)    pt remains intubated:   no bleeding  for trach in AM       Any change in ROS:     MEDICATIONS  (STANDING):  buDESOnide 160 MICROgram(s)/formoterol 4.5 MICROgram(s) Inhaler 2 Puff(s) Inhalation two times a day  chlorhexidine 4% Liquid 1 Application(s) Topical daily  fentaNYL   Infusion 3 MICROgram(s)/kG/Hr (22.5 mL/Hr) IV Continuous <Continuous>  ferrous    sulfate 325 milliGRAM(s) Oral three times a day with meals  folic acid 1 milliGRAM(s) Oral daily  insulin lispro (HumaLOG) corrective regimen sliding scale   SubCutaneous every 6 hours  ipratropium 17 MICROgram(s) HFA Inhaler 1 Puff(s) Inhalation every 6 hours  petrolatum Ophthalmic Ointment 1 Application(s) Both EYES two times a day  polyethylene glycol 3350 17 Gram(s) Oral at bedtime  predniSONE   Tablet 40 milliGRAM(s) Oral daily  propofol Infusion 30 MICROgram(s)/kG/Min (13.5 mL/Hr) IV Continuous <Continuous>  senna Syrup 5 milliLiter(s) Oral three times a day  valproic  acid Syrup 500 milliGRAM(s) Oral two times a day  voriconazole IVPB 300 milliGRAM(s) IV Intermittent every 12 hours    MEDICATIONS  (PRN):  acetaminophen  IVPB. 1000 milliGRAM(s) IV Intermittent once PRN Moderate Pain (4 - 6)  ALBUTerol    90 MICROgram(s) HFA Inhaler 2 Puff(s) Inhalation every 6 hours PRN Shortness of Breath and/or Wheezing    Vital Signs Last 24 Hrs  T(C): 36.7 (23 Nov 2017 12:00), Max: 36.9 (22 Nov 2017 20:00)  T(F): 98 (23 Nov 2017 12:00), Max: 98.5 (22 Nov 2017 20:00)  HR: 87 (23 Nov 2017 12:00) (67 - 130)  BP: --  BP(mean): --  RR: 12 (23 Nov 2017 12:00) (12 - 25)  SpO2: 96% (23 Nov 2017 12:00) (96% - 100%)  Mode: AC/ CMV (Assist Control/ Continuous Mandatory Ventilation)  RR (machine): 12  TV (machine): 450  FiO2: 40  PEEP: 5  MAP: 12  PIP: 44    I&O's Summary    22 Nov 2017 07:01  -  23 Nov 2017 07:00  --------------------------------------------------------  IN: 3299.4 mL / OUT: 1540 mL / NET: 1759.4 mL    23 Nov 2017 07:01  -  23 Nov 2017 13:04  --------------------------------------------------------  IN: 230 mL / OUT: 325 mL / NET: -95 mL          Physical Exam:   GENERAL: NAD, well-groomed, well-developed  HEENT: GIOVANI/   Atraumatic, Normocephalic  ENMT: No tonsillar erythema, exudates, or enlargement; Moist mucous membranes, Good dentition, No lesions  NECK: Supple, No JVD, Normal thyroid  CHEST/LUNG: coarse breath sounds   CVS: Regular rate and rhythm; No murmurs, rubs, or gallops  GI: : Soft, Nontender, Nondistended; Bowel sounds present  NERVOUS SYSTEM:  sedated  EXTREMITIES:  2+ Peripheral Pulses, No clubbing, cyanosis, or edema  LYMPH: No lymphadenopathy noted  SKIN: No rashes or lesions  ENDOCRINOLOGY: No Thyromegaly  PSYCH: sedated    Labs:  ABG - ( 23 Nov 2017 04:00 )  pH: 7.38  /  pCO2: 69    /  pO2: 147   / HCO3: 37    / Base Excess: 14.3  /  SaO2: 99.6                            7.8    8.52  )-----------( 85       ( 23 Nov 2017 04:00 )             24.2                         7.7    8.16  )-----------( 71       ( 22 Nov 2017 04:00 )             24.3                         8.1    9.30  )-----------( 57       ( 21 Nov 2017 04:00 )             25.0                         8.6    11.77 )-----------( 82       ( 20 Nov 2017 04:00 )             26.1     11-23    138  |  93<L>  |  13  ----------------------------<  186<H>  4.6   |  40<H>  |  0.56  11-22    138  |  93<L>  |  18  ----------------------------<  173<H>  4.4   |  34<H>  |  0.64  11-21    139  |  96<L>  |  18  ----------------------------<  145<H>  4.6   |  35<H>  |  0.70  11-20    137  |  97<L>  |  17  ----------------------------<  158<H>  4.8   |  31  |  0.75    Ca    8.2<L>      23 Nov 2017 04:00  Ca    8.6      22 Nov 2017 04:00  Phos  3.4     11-23  Phos  2.2     11-22  Mg     2.2     11-23  Mg     1.9     11-22    TPro  5.1<L>  /  Alb  2.9<L>  /  TBili  < 0.2<L>  /  DBili  x   /  AST  11  /  ALT  11  /  AlkPhos  61  11-23  TPro  5.7<L>  /  Alb  3.0<L>  /  TBili  < 0.2<L>  /  DBili  x   /  AST  9   /  ALT  9   /  AlkPhos  58  11-21    CAPILLARY BLOOD GLUCOSE      POCT Blood Glucose.: 206 mg/dL (23 Nov 2017 12:42)  POCT Blood Glucose.: 185 mg/dL (23 Nov 2017 05:49)  POCT Blood Glucose.: 190 mg/dL (22 Nov 2017 23:55)  POCT Blood Glucose.: 152 mg/dL (22 Nov 2017 17:28)      LIVER FUNCTIONS - ( 23 Nov 2017 04:00 )  Alb: 2.9 g/dL / Pro: 5.1 g/dL / ALK PHOS: 61 u/L / ALT: 11 u/L / AST: 11 u/L / GGT: x               Cultures:         < from: Xray Chest 1 View AP- PORTABLE-Urgent (11.22.17 @ 09:01) >    PROCEDURE DATE:  Nov 22 2017         INTERPRETATION:  TIME OF EXAM: November 22, 2017 at 8:48 AM    CLINICAL INFORMATION: Post extubation and reintubation.    TECHNIQUE:   Portable chest    INTERPRETATION:     An endotracheal tube is seen in satisfactory position.  No interval   change in suspected left pneumothorax with chronic changes in both lungs   with associated bulla formation. Left IJ line and enteric tube in place.      COMPARISON:  November 21      IMPRESSION:  Follow-up study post reintubation.                  RANDALL AMADOR M.D.; ATTENDING RADIOLOGIST  This document has been electronically signed. Nov 22 2017  2:04PM    < end of copied text >                      Studies  Chest X-RAY  CT SCAN Chest   Venous Dopplers: LE:   CT Abdomen  Others

## 2017-11-23 NOTE — PROGRESS NOTE ADULT - PROBLEM SELECTOR PLAN 3
Cont BD with inhaled steroids and oral steroids  11/114: Pts wheezing has significantly improved: he will need long term steroids as he starts wheezing once his steroids are dced!  11/15: Wheezing has increased: started on IV steroids high dose by thoracic team: Would suggest to decrease to 20 mg three times a day !  11/16: decrease steroids today to 20 mg three times day  11/17: try to decrease steroids in AM  11/18 Duoneb. intubated for acute respiratory distress with hypoxia  11/19 KIARA. intubated and on ventilator support  11/20: cont vent support: start weaning as tolerated!  11/21: would decrease  his steroids to 30 mg a day from tomorrow  11/22: DECREASE STEROIDS: HE WHEEZES WHEN THE STEROIDS ARE TAPERED: BUT NEED TO BE LOWERED NOW AND WATCH!!  11/23: on 40 mg of prednisone: try to decrease and taper

## 2017-11-23 NOTE — PROGRESS NOTE ADULT - ATTENDING COMMENTS
Critically ill male on vent with hypoxemic/hypercapnic respiratory failure. Underlying sarcoidosis admitted with hemoptysis in setting of aspergilloma s/p resection. Unable to wean. Plan for trach in AM.

## 2017-11-23 NOTE — PROGRESS NOTE ADULT - PROBLEM SELECTOR PLAN 1
intubated: try to titrate as tolerated!  11/18 on ventilator support. Pt is sedated and paralyzed. defer ventilator Management/ weaning per CTICU team.  11/19 still sedated and paralyzed. Plan to wean off Nimbex today per Primary RN. ABGs reviewed.  11/20:ff paralyzing agent! But sedated !!  11/21: awake and alert: responding to simple commands: weaning trials  11/22: currently sedated: ? trach now?  11/23: for trach in AM

## 2017-11-23 NOTE — PROGRESS NOTE ADULT - SUBJECTIVE AND OBJECTIVE BOX
CHIEF COMPLAINT: Hemoptysis    Interval Events: Pickens replaced due to lack of urine output from sheath catheter    REVIEW OF SYSTEMS:  Constitutional: [ ] negative [ ] fevers [ ] chills [ ] weight loss [ ] weight gain  HEENT: [ ] negative [ ] dry eyes [ ] eye irritation [ ] postnasal drip [ ] nasal congestion  CV: [ ] negative  [ ] chest pain [ ] orthopnea [ ] palpitations [ ] murmur  Resp: [ ] negative [ ] cough [ ] shortness of breath [ ] dyspnea [ ] wheezing [ ] sputum [ ] hemoptysis  GI: [ ] negative [ ] nausea [ ] vomiting [ ] diarrhea [ ] constipation [ ] abd pain [ ] dysphagia   : [ ] negative [ ] dysuria [ ] nocturia [ ] hematuria [ ] increased urinary frequency  Musculoskeletal: [ ] negative [ ] back pain [ ] myalgias [ ] arthralgias [ ] fracture  Skin: [ ] negative [ ] rash [ ] itch  Neurological: [ ] negative [ ] headache [ ] dizziness [ ] syncope [ ] weakness [ ] numbness  Psychiatric: [ ] negative [ ] anxiety [ ] depression  Endocrine: [ ] negative [ ] diabetes [ ] thyroid problem  Hematologic/Lymphatic: [ ] negative [ ] anemia [ ] bleeding problem  Allergic/Immunologic: [ ] negative [ ] itchy eyes [ ] nasal discharge [ ] hives [ ] angioedema  [ ] All other systems negative  [ ] Unable to assess ROS because ________    OBJECTIVE:  ICU Vital Signs Last 24 Hrs  T(C): 36.9 (23 Nov 2017 04:00), Max: 36.9 (22 Nov 2017 08:00)  T(F): 98.5 (23 Nov 2017 04:00), Max: 98.5 (22 Nov 2017 20:00)  HR: 114 (23 Nov 2017 06:15) (55 - 148)  BP: 143/90 (22 Nov 2017 12:05) (79/50 - 173/112)  BP(mean): 103 (22 Nov 2017 12:05) (56 - 126)  ABP: 165/93 (23 Nov 2017 06:15) (76/50 - 204/117)  ABP(mean): 116 (23 Nov 2017 06:15) (58 - 147)  RR: 25 (23 Nov 2017 06:15) (12 - 27)  SpO2: 99% (23 Nov 2017 06:15) (97% - 100%)    Mode: AC/ CMV (Assist Control/ Continuous Mandatory Ventilation), RR (machine): 12, TV (machine): 450, FiO2: 40, PEEP: 5, MAP: 11, PIP: 42    11-21 @ 07:01  - 11-22 @ 07:00  --------------------------------------------------------  IN: 2991 mL / OUT: 1780 mL / NET: 1211 mL    11-22 @ 07:01 - 11-23 @ 06:38  --------------------------------------------------------  IN: 3149.4 mL / OUT: 1540 mL / NET: 1609.4 mL      CAPILLARY BLOOD GLUCOSE      POCT Blood Glucose.: 185 mg/dL (23 Nov 2017 05:49)      PHYSICAL EXAM:  General:   HEENT:   Lymph Nodes:  Neck:   Respiratory:   Cardiovascular:   Abdomen:   Extremities:   Skin:   Neurological:  Psychiatry:    LINES:    HOSPITAL MEDICATIONS:  Standing Meds:  atorvastatin 80 milliGRAM(s) Oral at bedtime  buDESOnide 160 MICROgram(s)/formoterol 4.5 MICROgram(s) Inhaler 2 Puff(s) Inhalation two times a day  chlorhexidine 4% Liquid 1 Application(s) Topical daily  fentaNYL   Infusion 3 MICROgram(s)/kG/Hr IV Continuous <Continuous>  ferrous    sulfate 325 milliGRAM(s) Oral three times a day with meals  folic acid 1 milliGRAM(s) Oral daily  insulin lispro (HumaLOG) corrective regimen sliding scale   SubCutaneous every 6 hours  ipratropium 17 MICROgram(s) HFA Inhaler 1 Puff(s) Inhalation every 6 hours  petrolatum Ophthalmic Ointment 1 Application(s) Both EYES two times a day  polyethylene glycol 3350 17 Gram(s) Oral at bedtime  predniSONE   Tablet 40 milliGRAM(s) Oral daily  propofol Infusion 30 MICROgram(s)/kG/Min IV Continuous <Continuous>  senna Syrup 5 milliLiter(s) Oral three times a day  valproic  acid Syrup 500 milliGRAM(s) Oral two times a day  voriconazole IVPB 300 milliGRAM(s) IV Intermittent every 12 hours      PRN Meds:  acetaminophen  IVPB. 1000 milliGRAM(s) IV Intermittent once PRN  ALBUTerol    90 MICROgram(s) HFA Inhaler 2 Puff(s) Inhalation every 6 hours PRN      LABS:                        7.8    8.52  )-----------( 85       ( 23 Nov 2017 04:00 )             24.2     Hgb Trend: 7.8<--, 7.7<--, 8.1<--, 8.6<--, 7.8<--  11-23    138  |  93<L>  |  13  ----------------------------<  186<H>  4.6   |  40<H>  |  0.56    Ca    8.2<L>      23 Nov 2017 04:00  Phos  3.4     11-23  Mg     2.2     11-23    TPro  5.1<L>  /  Alb  2.9<L>  /  TBili  < 0.2<L>  /  DBili  x   /  AST  11  /  ALT  11  /  AlkPhos  61  11-23    Creatinine Trend: 0.56<--, 0.64<--, 0.70<--, 0.75<--, 0.62<--, 0.81<--      Arterial Blood Gas:  11-23 @ 04:00  7.38/69/147/37/99.6/14.3  ABG lactate: 1.3  Arterial Blood Gas:  11-22 @ 17:30  7.45/46/177/31/99.8/7.6  ABG lactate: --  Arterial Blood Gas:  11-22 @ 08:50  7.41/53/207/32/100/8.0  ABG lactate: --        MICROBIOLOGY:     RADIOLOGY:  [ ] Reviewed and interpreted by me    EKG: CHIEF COMPLAINT: Hemoptysis    Interval Events: Pickens replaced due to lack of urine output from sheath catheter    REVIEW OF SYSTEMS:  Constitutional: [x ] negative [ ] fevers [ ] chills [ ] weight loss [ ] weight gain  HEENT: x[ ] negative [ ] dry eyes [ ] eye irritation [ ] postnasal drip [ ] nasal congestion  CV: x[ ] negative  [ ] chest pain [ ] orthopnea [ ] palpitations [ ] murmur  Resp: [ ] negative [ ] cough [ x] shortness of breath [ ] dyspnea [ ] wheezing [ ] sputum [ ] hemoptysis  GI: [x ] negative [ ] nausea [ ] vomiting [ ] diarrhea [ ] constipation [ ] abd pain [ ] dysphagia   : [x ] negative [ ] dysuria [ ] nocturia [ ] hematuria [ ] increased urinary frequency  Musculoskeletal: [x ] negative [ ] back pain [ ] myalgias [ ] arthralgias [ ] fracture  Skin: [x ] negative [ ] rash [ ] itch  Neurological: [x ] negative [ ] headache [ ] dizziness [ ] syncope [ ] weakness [ ] numbness  Psychiatric: [ ] negative [ ] anxiety [ ] depression  Endocrine: [x ] negative [ ] diabetes [ ] thyroid problem  Hematologic/Lymphatic: [x ] negative [ ] anemia [ ] bleeding problem  Allergic/Immunologic: [ x] negative [ ] itchy eyes [ ] nasal discharge [ ] hives [ ] angioedema  [ ] All other systems negative  [ ] Unable to assess ROS because ________    OBJECTIVE:  ICU Vital Signs Last 24 Hrs  T(C): 36.9 (23 Nov 2017 04:00), Max: 36.9 (22 Nov 2017 08:00)  T(F): 98.5 (23 Nov 2017 04:00), Max: 98.5 (22 Nov 2017 20:00)  HR: 114 (23 Nov 2017 06:15) (55 - 148)  BP: 143/90 (22 Nov 2017 12:05) (79/50 - 173/112)  BP(mean): 103 (22 Nov 2017 12:05) (56 - 126)  ABP: 165/93 (23 Nov 2017 06:15) (76/50 - 204/117)  ABP(mean): 116 (23 Nov 2017 06:15) (58 - 147)  RR: 25 (23 Nov 2017 06:15) (12 - 27)  SpO2: 99% (23 Nov 2017 06:15) (97% - 100%)    Mode: AC/ CMV (Assist Control/ Continuous Mandatory Ventilation), RR (machine): 12, TV (machine): 450, FiO2: 40, PEEP: 5, MAP: 11, PIP: 42    11-21 @ 07:01  -  11-22 @ 07:00  --------------------------------------------------------  IN: 2991 mL / OUT: 1780 mL / NET: 1211 mL    11-22 @ 07:01 - 11-23 @ 06:38  --------------------------------------------------------  IN: 3149.4 mL / OUT: 1540 mL / NET: 1609.4 mL      CAPILLARY BLOOD GLUCOSE      POCT Blood Glucose.: 185 mg/dL (23 Nov 2017 05:49)      PHYSICAL EXAM:   General: intubated, sedated but responds to commands, nods yes/no, NAD  HEENT: normal  Lymph Nodes: no palpable lymphadenopathy  Neck: normal  Respiratory: b/l rhonchi diffusely, unchanged from yesterday  Cardiovascular: tachycardic, no MRG  Abdomen: soft, NTND  Extremities: warm, normal capillary refill, SCDs in place  Skin: no breakdown or rashes noted  Neurological: able to move all commands  Psychiatry: normal    LINES: 3 x PIV (L IJ central line removed yesterday), R radial A line, ETT, [facility] MEDICATIONS:  Standing Meds:  atorvastatin 80 milliGRAM(s) Oral at bedtime  buDESOnide 160 MICROgram(s)/formoterol 4.5 MICROgram(s) Inhaler 2 Puff(s) Inhalation two times a day  chlorhexidine 4% Liquid 1 Application(s) Topical daily  fentaNYL   Infusion 3 MICROgram(s)/kG/Hr IV Continuous <Continuous>  ferrous    sulfate 325 milliGRAM(s) Oral three times a day with meals  folic acid 1 milliGRAM(s) Oral daily  insulin lispro (HumaLOG) corrective regimen sliding scale   SubCutaneous every 6 hours  ipratropium 17 MICROgram(s) HFA Inhaler 1 Puff(s) Inhalation every 6 hours  petrolatum Ophthalmic Ointment 1 Application(s) Both EYES two times a day  polyethylene glycol 3350 17 Gram(s) Oral at bedtime  predniSONE   Tablet 40 milliGRAM(s) Oral daily  propofol Infusion 30 MICROgram(s)/kG/Min IV Continuous <Continuous>  senna Syrup 5 milliLiter(s) Oral three times a day  valproic  acid Syrup 500 milliGRAM(s) Oral two times a day  voriconazole IVPB 300 milliGRAM(s) IV Intermittent every 12 hours      PRN Meds:  acetaminophen  IVPB. 1000 milliGRAM(s) IV Intermittent once PRN  ALBUTerol    90 MICROgram(s) HFA Inhaler 2 Puff(s) Inhalation every 6 hours PRN      LABS:                        7.8    8.52  )-----------( 85       ( 23 Nov 2017 04:00 )             24.2     Hgb Trend: 7.8<--, 7.7<--, 8.1<--, 8.6<--, 7.8<--  11-23    138  |  93<L>  |  13  ----------------------------<  186<H>  4.6   |  40<H>  |  0.56    Ca    8.2<L>      23 Nov 2017 04:00  Phos  3.4     11-23  Mg     2.2     11-23    TPro  5.1<L>  /  Alb  2.9<L>  /  TBili  < 0.2<L>  /  DBili  x   /  AST  11  /  ALT  11  /  AlkPhos  61  11-23    Creatinine Trend: 0.56<--, 0.64<--, 0.70<--, 0.75<--, 0.62<--, 0.81<--      Arterial Blood Gas:  11-23 @ 04:00  7.38/69/147/37/99.6/14.3  ABG lactate: 1.3  Arterial Blood Gas:  11-22 @ 17:30  7.45/46/177/31/99.8/7.6  ABG lactate: --  Arterial Blood Gas:  11-22 @ 08:50  7.41/53/207/32/100/8.0  ABG lactate: --        MICROBIOLOGY:   Bronch cultures from 11/15 negative (normal respiratory gracie).     RADIOLOGY:  [ ] Reviewed and interpreted by me    EKG: CHIEF COMPLAINT: Hemoptysis    Interval Events: Pickens replaced due to lack of urine output from sheath catheter    REVIEW OF SYSTEMS:  Constitutional: [x ] negative [ ] fevers [ ] chills [ ] weight loss [ ] weight gain  HEENT: x[ ] negative [ ] dry eyes [ ] eye irritation [ ] postnasal drip [ ] nasal congestion  CV: x[ ] negative  [ ] chest pain [ ] orthopnea [ ] palpitations [ ] murmur  Resp: [ ] negative [ ] cough [ x] shortness of breath [ ] dyspnea [ ] wheezing [ ] sputum [ ] hemoptysis  GI: [x ] negative [ ] nausea [ ] vomiting [ ] diarrhea [ ] constipation [ ] abd pain [ ] dysphagia   : [x ] negative [ ] dysuria [ ] nocturia [ ] hematuria [ ] increased urinary frequency  Musculoskeletal: [x ] negative [ ] back pain [ ] myalgias [ ] arthralgias [ ] fracture  Skin: [x ] negative [ ] rash [ ] itch  Neurological: [x ] negative [ ] headache [ ] dizziness [ ] syncope [ ] weakness [ ] numbness  Psychiatric: [ ] negative [ ] anxiety [ ] depression  Endocrine: [x ] negative [ ] diabetes [ ] thyroid problem  Hematologic/Lymphatic: [x ] negative [ ] anemia [ ] bleeding problem  Allergic/Immunologic: [ x] negative [ ] itchy eyes [ ] nasal discharge [ ] hives [ ] angioedema  [x] All other systems negative  [ ] Unable to assess ROS because ________    OBJECTIVE:  ICU Vital Signs Last 24 Hrs  T(C): 36.9 (23 Nov 2017 04:00), Max: 36.9 (22 Nov 2017 08:00)  T(F): 98.5 (23 Nov 2017 04:00), Max: 98.5 (22 Nov 2017 20:00)  HR: 114 (23 Nov 2017 06:15) (55 - 148)  BP: 143/90 (22 Nov 2017 12:05) (79/50 - 173/112)  BP(mean): 103 (22 Nov 2017 12:05) (56 - 126)  ABP: 165/93 (23 Nov 2017 06:15) (76/50 - 204/117)  ABP(mean): 116 (23 Nov 2017 06:15) (58 - 147)  RR: 25 (23 Nov 2017 06:15) (12 - 27)  SpO2: 99% (23 Nov 2017 06:15) (97% - 100%)    Mode: AC/ CMV (Assist Control/ Continuous Mandatory Ventilation), RR (machine): 12, TV (machine): 450, FiO2: 40, PEEP: 5, MAP: 11, PIP: 42    11-21 @ 07:01  -  11-22 @ 07:00  --------------------------------------------------------  IN: 2991 mL / OUT: 1780 mL / NET: 1211 mL    11-22 @ 07:01 - 11-23 @ 06:38  --------------------------------------------------------  IN: 3149.4 mL / OUT: 1540 mL / NET: 1609.4 mL      CAPILLARY BLOOD GLUCOSE      POCT Blood Glucose.: 185 mg/dL (23 Nov 2017 05:49)      PHYSICAL EXAM:   General: intubated, sedated but responds to commands, nods yes/no, NAD  HEENT: normal  Lymph Nodes: no palpable lymphadenopathy  Neck: normal  Respiratory: b/l rhonchi diffusely, unchanged from yesterday  Cardiovascular: tachycardic, no MRG  Abdomen: soft, NTND  Extremities: warm, normal capillary refill, SCDs in place  Skin: no breakdown or rashes noted  Neurological: able to move all commands  Psychiatry: normal    LINES: 3 x PIV (L IJ central line removed yesterday), R radial A line, ETT, [facility] MEDICATIONS:  Standing Meds:  atorvastatin 80 milliGRAM(s) Oral at bedtime  buDESOnide 160 MICROgram(s)/formoterol 4.5 MICROgram(s) Inhaler 2 Puff(s) Inhalation two times a day  chlorhexidine 4% Liquid 1 Application(s) Topical daily  fentaNYL   Infusion 3 MICROgram(s)/kG/Hr IV Continuous <Continuous>  ferrous    sulfate 325 milliGRAM(s) Oral three times a day with meals  folic acid 1 milliGRAM(s) Oral daily  insulin lispro (HumaLOG) corrective regimen sliding scale   SubCutaneous every 6 hours  ipratropium 17 MICROgram(s) HFA Inhaler 1 Puff(s) Inhalation every 6 hours  petrolatum Ophthalmic Ointment 1 Application(s) Both EYES two times a day  polyethylene glycol 3350 17 Gram(s) Oral at bedtime  predniSONE   Tablet 40 milliGRAM(s) Oral daily  propofol Infusion 30 MICROgram(s)/kG/Min IV Continuous <Continuous>  senna Syrup 5 milliLiter(s) Oral three times a day  valproic  acid Syrup 500 milliGRAM(s) Oral two times a day  voriconazole IVPB 300 milliGRAM(s) IV Intermittent every 12 hours      PRN Meds:  acetaminophen  IVPB. 1000 milliGRAM(s) IV Intermittent once PRN  ALBUTerol    90 MICROgram(s) HFA Inhaler 2 Puff(s) Inhalation every 6 hours PRN      LABS:                        7.8    8.52  )-----------( 85       ( 23 Nov 2017 04:00 )             24.2     Hgb Trend: 7.8<--, 7.7<--, 8.1<--, 8.6<--, 7.8<--  11-23    138  |  93<L>  |  13  ----------------------------<  186<H>  4.6   |  40<H>  |  0.56    Ca    8.2<L>      23 Nov 2017 04:00  Phos  3.4     11-23  Mg     2.2     11-23    TPro  5.1<L>  /  Alb  2.9<L>  /  TBili  < 0.2<L>  /  DBili  x   /  AST  11  /  ALT  11  /  AlkPhos  61  11-23    Creatinine Trend: 0.56<--, 0.64<--, 0.70<--, 0.75<--, 0.62<--, 0.81<--      Arterial Blood Gas:  11-23 @ 04:00  7.38/69/147/37/99.6/14.3  ABG lactate: 1.3  Arterial Blood Gas:  11-22 @ 17:30  7.45/46/177/31/99.8/7.6  ABG lactate: --  Arterial Blood Gas:  11-22 @ 08:50  7.41/53/207/32/100/8.0  ABG lactate: --        MICROBIOLOGY:   Bronch cultures from 11/15 negative (normal respiratory gracie).     RADIOLOGY:  [ ] Reviewed and interpreted by me    EKG:

## 2017-11-23 NOTE — PROGRESS NOTE ADULT - ATTENDING COMMENTS
pt doing poorly  paralyzed, sedated but not requiring high FIO2 :   no bleeding noted:  try to wean sedation as well as paralyzing agent   Steroids have been decreased: goal would be rapidly tapered down steroids to minimum possible given h e is on sedation and paralyzing agent: But per reports pt gets very agitated when taken of paralyzing agent!    11/20: off paralyzing agent; cont current care: may decrease steroids to 30 mg in a day or two    11/21: off sedation : alert and awake and responds to simple commands; weaning trials!!  11/23: continues to be on ventilator: failed weaning attempts multiple times: for trach now: prognosis guarded!!

## 2017-11-23 NOTE — PROGRESS NOTE ADULT - PROBLEM SELECTOR PLAN 2
? 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 ot from bronchiectasis? eliquis have been dced: needs card to se: In addition, he may need IR help with embolization, it his hemoptysis increases: He has had pleurodesis on the left side.  11/10 quantify hemoptysis. no AC. stable  11/11 no more hemoptysis. off AC  11/12 resolved. off AC  11/13: today had hemoptysis again----->localize site---> IR embolization: left a message for thoracic team  11/14: DW Dr Caldera: for repeat bronchoscopy to localize the site of bleeding and need IR help to embolize the culprit vessel!  11/15: started on broad spectrum antibiotics and for bronchoscopy today  11/16: s/p IR emobolization done: monitor for more hemoptysis  11/17: s/p bronchsocopy: reportedly old blood seen in airways!  11/18 s/p bronchoscopy in ICU via ETT. old clot. no active bleeding  11/19 stable. no active bleeding per recent bronchoscopy  11/20; off AC at this time  11/22: resolved: remained off ac  11/23: remained off AC: has hx of PAF

## 2017-11-24 LAB
ALBUMIN SERPL ELPH-MCNC: 2.8 G/DL — LOW (ref 3.3–5)
ALP SERPL-CCNC: 58 U/L — SIGNIFICANT CHANGE UP (ref 40–120)
ALT FLD-CCNC: 11 U/L — SIGNIFICANT CHANGE UP (ref 4–41)
ANISOCYTOSIS BLD QL: SLIGHT — SIGNIFICANT CHANGE UP
APTT BLD: 23.7 SEC — LOW (ref 27.5–37.4)
AST SERPL-CCNC: 12 U/L — SIGNIFICANT CHANGE UP (ref 4–40)
BASOPHILS # BLD AUTO: 0.01 K/UL — SIGNIFICANT CHANGE UP (ref 0–0.2)
BASOPHILS NFR BLD AUTO: 0.1 % — SIGNIFICANT CHANGE UP (ref 0–2)
BASOPHILS NFR SPEC: 0 % — SIGNIFICANT CHANGE UP (ref 0–2)
BILIRUB SERPL-MCNC: < 0.2 MG/DL — LOW (ref 0.2–1.2)
BLD GP AB SCN SERPL QL: NEGATIVE — SIGNIFICANT CHANGE UP
BUN SERPL-MCNC: 15 MG/DL — SIGNIFICANT CHANGE UP (ref 7–23)
CALCIUM SERPL-MCNC: 8.6 MG/DL — SIGNIFICANT CHANGE UP (ref 8.4–10.5)
CHLORIDE SERPL-SCNC: 94 MMOL/L — LOW (ref 98–107)
CO2 SERPL-SCNC: 40 MMOL/L — HIGH (ref 22–31)
CREAT SERPL-MCNC: 0.67 MG/DL — SIGNIFICANT CHANGE UP (ref 0.5–1.3)
EOSINOPHIL # BLD AUTO: 0.02 K/UL — SIGNIFICANT CHANGE UP (ref 0–0.5)
EOSINOPHIL NFR BLD AUTO: 0.3 % — SIGNIFICANT CHANGE UP (ref 0–6)
EOSINOPHIL NFR FLD: 0 % — SIGNIFICANT CHANGE UP (ref 0–6)
GIANT PLATELETS BLD QL SMEAR: PRESENT — SIGNIFICANT CHANGE UP
GLUCOSE BLDC GLUCOMTR-MCNC: 100 MG/DL — HIGH (ref 70–99)
GLUCOSE BLDC GLUCOMTR-MCNC: 59 MG/DL — LOW (ref 70–99)
GLUCOSE BLDC GLUCOMTR-MCNC: 93 MG/DL — SIGNIFICANT CHANGE UP (ref 70–99)
GLUCOSE SERPL-MCNC: 89 MG/DL — SIGNIFICANT CHANGE UP (ref 70–99)
HCT VFR BLD CALC: 26.7 % — LOW (ref 39–50)
HGB BLD-MCNC: 8.1 G/DL — LOW (ref 13–17)
HYPOCHROMIA BLD QL: SLIGHT — SIGNIFICANT CHANGE UP
IMM GRANULOCYTES # BLD AUTO: 0.15 # — SIGNIFICANT CHANGE UP
IMM GRANULOCYTES NFR BLD AUTO: 2.2 % — HIGH (ref 0–1.5)
INR BLD: 1.14 — SIGNIFICANT CHANGE UP (ref 0.88–1.17)
LYMPHOCYTES # BLD AUTO: 1.09 K/UL — SIGNIFICANT CHANGE UP (ref 1–3.3)
LYMPHOCYTES # BLD AUTO: 15.8 % — SIGNIFICANT CHANGE UP (ref 13–44)
LYMPHOCYTES NFR SPEC AUTO: 8.4 % — LOW (ref 13–44)
MAGNESIUM SERPL-MCNC: 2 MG/DL — SIGNIFICANT CHANGE UP (ref 1.6–2.6)
MCHC RBC-ENTMCNC: 30.2 PG — SIGNIFICANT CHANGE UP (ref 27–34)
MCHC RBC-ENTMCNC: 30.3 % — LOW (ref 32–36)
MCV RBC AUTO: 99.6 FL — SIGNIFICANT CHANGE UP (ref 80–100)
MONOCYTES # BLD AUTO: 0.86 K/UL — SIGNIFICANT CHANGE UP (ref 0–0.9)
MONOCYTES NFR BLD AUTO: 12.4 % — SIGNIFICANT CHANGE UP (ref 2–14)
MONOCYTES NFR BLD: 7.5 % — SIGNIFICANT CHANGE UP (ref 2–9)
MYELOCYTES NFR BLD: 1.9 % — HIGH (ref 0–0)
NEUTROPHIL AB SER-ACNC: 76.6 % — SIGNIFICANT CHANGE UP (ref 43–77)
NEUTROPHILS # BLD AUTO: 4.78 K/UL — SIGNIFICANT CHANGE UP (ref 1.8–7.4)
NEUTROPHILS NFR BLD AUTO: 69.2 % — SIGNIFICANT CHANGE UP (ref 43–77)
NRBC # FLD: 0.02 — SIGNIFICANT CHANGE UP
OVALOCYTES BLD QL SMEAR: SLIGHT — SIGNIFICANT CHANGE UP
PHOSPHATE SERPL-MCNC: 2.8 MG/DL — SIGNIFICANT CHANGE UP (ref 2.5–4.5)
PLATELET # BLD AUTO: 83 K/UL — LOW (ref 150–400)
PLATELET COUNT - ESTIMATE: SIGNIFICANT CHANGE UP
PMV BLD: 12.6 FL — SIGNIFICANT CHANGE UP (ref 7–13)
POTASSIUM SERPL-MCNC: 4.5 MMOL/L — SIGNIFICANT CHANGE UP (ref 3.5–5.3)
POTASSIUM SERPL-SCNC: 4.5 MMOL/L — SIGNIFICANT CHANGE UP (ref 3.5–5.3)
PROT SERPL-MCNC: 5.3 G/DL — LOW (ref 6–8.3)
PROTHROM AB SERPL-ACNC: 12.8 SEC — SIGNIFICANT CHANGE UP (ref 9.8–13.1)
RBC # BLD: 2.68 M/UL — LOW (ref 4.2–5.8)
RBC # FLD: 15 % — HIGH (ref 10.3–14.5)
RH IG SCN BLD-IMP: POSITIVE — SIGNIFICANT CHANGE UP
SODIUM SERPL-SCNC: 140 MMOL/L — SIGNIFICANT CHANGE UP (ref 135–145)
VARIANT LYMPHS # BLD: 5.6 % — SIGNIFICANT CHANGE UP
WBC # BLD: 6.91 K/UL — SIGNIFICANT CHANGE UP (ref 3.8–10.5)
WBC # FLD AUTO: 6.91 K/UL — SIGNIFICANT CHANGE UP (ref 3.8–10.5)

## 2017-11-24 PROCEDURE — 99232 SBSQ HOSP IP/OBS MODERATE 35: CPT

## 2017-11-24 PROCEDURE — 31624 DX BRONCHOSCOPE/LAVAGE: CPT

## 2017-11-24 PROCEDURE — 71010: CPT | Mod: 26

## 2017-11-24 PROCEDURE — 99291 CRITICAL CARE FIRST HOUR: CPT

## 2017-11-24 PROCEDURE — 31600 PLANNED TRACHEOSTOMY: CPT

## 2017-11-24 RX ORDER — PROPOFOL 10 MG/ML
50 INJECTION, EMULSION INTRAVENOUS ONCE
Qty: 0 | Refills: 0 | Status: COMPLETED | OUTPATIENT
Start: 2017-11-24 | End: 2017-11-24

## 2017-11-24 RX ORDER — SODIUM CHLORIDE 9 MG/ML
500 INJECTION INTRAMUSCULAR; INTRAVENOUS; SUBCUTANEOUS ONCE
Qty: 0 | Refills: 0 | Status: COMPLETED | OUTPATIENT
Start: 2017-11-24 | End: 2017-11-24

## 2017-11-24 RX ORDER — NOREPINEPHRINE BITARTRATE/D5W 8 MG/250ML
0.05 PLASTIC BAG, INJECTION (ML) INTRAVENOUS
Qty: 8 | Refills: 0 | Status: DISCONTINUED | OUTPATIENT
Start: 2017-11-24 | End: 2017-11-26

## 2017-11-24 RX ORDER — MIDAZOLAM HYDROCHLORIDE 1 MG/ML
4 INJECTION, SOLUTION INTRAMUSCULAR; INTRAVENOUS ONCE
Qty: 0 | Refills: 0 | Status: DISCONTINUED | OUTPATIENT
Start: 2017-11-24 | End: 2017-11-24

## 2017-11-24 RX ORDER — HEPARIN SODIUM 5000 [USP'U]/ML
5000 INJECTION INTRAVENOUS; SUBCUTANEOUS EVERY 8 HOURS
Qty: 0 | Refills: 0 | Status: DISCONTINUED | OUTPATIENT
Start: 2017-11-24 | End: 2017-11-28

## 2017-11-24 RX ORDER — VALPROIC ACID (AS SODIUM SALT) 250 MG/5ML
500 SOLUTION, ORAL ORAL ONCE
Qty: 0 | Refills: 0 | Status: COMPLETED | OUTPATIENT
Start: 2017-11-24 | End: 2017-11-25

## 2017-11-24 RX ORDER — DEXTROSE 50 % IN WATER 50 %
50 SYRINGE (ML) INTRAVENOUS ONCE
Qty: 0 | Refills: 0 | Status: COMPLETED | OUTPATIENT
Start: 2017-11-24 | End: 2017-11-24

## 2017-11-24 RX ORDER — FENTANYL CITRATE 50 UG/ML
1 INJECTION INTRAVENOUS
Qty: 0 | Refills: 0 | Status: DISCONTINUED | OUTPATIENT
Start: 2017-11-24 | End: 2017-11-27

## 2017-11-24 RX ORDER — PROPOFOL 10 MG/ML
50 INJECTION, EMULSION INTRAVENOUS
Qty: 1000 | Refills: 0 | Status: DISCONTINUED | OUTPATIENT
Start: 2017-11-24 | End: 2017-11-26

## 2017-11-24 RX ORDER — FENTANYL CITRATE 50 UG/ML
4 INJECTION INTRAVENOUS
Qty: 2500 | Refills: 0 | Status: DISCONTINUED | OUTPATIENT
Start: 2017-11-24 | End: 2017-11-25

## 2017-11-24 RX ORDER — FENTANYL CITRATE 50 UG/ML
100 INJECTION INTRAVENOUS ONCE
Qty: 0 | Refills: 0 | Status: DISCONTINUED | OUTPATIENT
Start: 2017-11-24 | End: 2017-11-24

## 2017-11-24 RX ORDER — CISATRACURIUM BESYLATE 2 MG/ML
20 INJECTION INTRAVENOUS ONCE
Qty: 0 | Refills: 0 | Status: COMPLETED | OUTPATIENT
Start: 2017-11-24 | End: 2017-11-24

## 2017-11-24 RX ADMIN — Medication 4 MILLIGRAM(S): at 14:46

## 2017-11-24 RX ADMIN — Medication 50 MILLILITER(S): at 23:52

## 2017-11-24 RX ADMIN — MIDAZOLAM HYDROCHLORIDE 4 MILLIGRAM(S): 1 INJECTION, SOLUTION INTRAMUSCULAR; INTRAVENOUS at 14:39

## 2017-11-24 RX ADMIN — SENNA PLUS 5 MILLILITER(S): 8.6 TABLET ORAL at 05:29

## 2017-11-24 RX ADMIN — VORICONAZOLE 125 MILLIGRAM(S): 10 INJECTION, POWDER, LYOPHILIZED, FOR SOLUTION INTRAVENOUS at 23:06

## 2017-11-24 RX ADMIN — BUDESONIDE AND FORMOTEROL FUMARATE DIHYDRATE 2 PUFF(S): 160; 4.5 AEROSOL RESPIRATORY (INHALATION) at 09:33

## 2017-11-24 RX ADMIN — Medication 500 MILLIGRAM(S): at 05:30

## 2017-11-24 RX ADMIN — BUDESONIDE AND FORMOTEROL FUMARATE DIHYDRATE 2 PUFF(S): 160; 4.5 AEROSOL RESPIRATORY (INHALATION) at 22:04

## 2017-11-24 RX ADMIN — FENTANYL CITRATE 22.5 MICROGRAM(S)/KG/HR: 50 INJECTION INTRAVENOUS at 09:18

## 2017-11-24 RX ADMIN — Medication 1 PUFF(S): at 04:07

## 2017-11-24 RX ADMIN — HEPARIN SODIUM 5000 UNIT(S): 5000 INJECTION INTRAVENOUS; SUBCUTANEOUS at 23:06

## 2017-11-24 RX ADMIN — CHLORHEXIDINE GLUCONATE 1 APPLICATION(S): 213 SOLUTION TOPICAL at 09:30

## 2017-11-24 RX ADMIN — Medication 1 PUFF(S): at 22:04

## 2017-11-24 RX ADMIN — Medication 1 PUFF(S): at 09:36

## 2017-11-24 RX ADMIN — FENTANYL CITRATE 3 MICROGRAM(S)/KG/HR: 50 INJECTION INTRAVENOUS at 00:25

## 2017-11-24 RX ADMIN — PROPOFOL 50 MILLIGRAM(S): 10 INJECTION, EMULSION INTRAVENOUS at 15:25

## 2017-11-24 RX ADMIN — FENTANYL CITRATE 100 MICROGRAM(S): 50 INJECTION INTRAVENOUS at 14:35

## 2017-11-24 RX ADMIN — Medication 7.03 MICROGRAM(S)/KG/MIN: at 17:30

## 2017-11-24 RX ADMIN — PROPOFOL 13.5 MICROGRAM(S)/KG/MIN: 10 INJECTION, EMULSION INTRAVENOUS at 00:24

## 2017-11-24 RX ADMIN — FENTANYL CITRATE 3 MICROGRAM(S)/KG/HR: 50 INJECTION INTRAVENOUS at 04:30

## 2017-11-24 RX ADMIN — Medication 1 PUFF(S): at 15:42

## 2017-11-24 RX ADMIN — SODIUM CHLORIDE 500 MILLILITER(S): 9 INJECTION INTRAMUSCULAR; INTRAVENOUS; SUBCUTANEOUS at 23:37

## 2017-11-24 RX ADMIN — PROPOFOL 13.5 MICROGRAM(S)/KG/MIN: 10 INJECTION, EMULSION INTRAVENOUS at 09:18

## 2017-11-24 RX ADMIN — VORICONAZOLE 125 MILLIGRAM(S): 10 INJECTION, POWDER, LYOPHILIZED, FOR SOLUTION INTRAVENOUS at 00:23

## 2017-11-24 RX ADMIN — Medication 1 APPLICATION(S): at 17:28

## 2017-11-24 RX ADMIN — CISATRACURIUM BESYLATE 20 MILLIGRAM(S): 2 INJECTION INTRAVENOUS at 15:14

## 2017-11-24 RX ADMIN — Medication 1 APPLICATION(S): at 05:29

## 2017-11-24 RX ADMIN — Medication 40 MILLIGRAM(S): at 05:30

## 2017-11-24 RX ADMIN — Medication 4 MILLIGRAM(S): at 14:35

## 2017-11-24 RX ADMIN — VORICONAZOLE 125 MILLIGRAM(S): 10 INJECTION, POWDER, LYOPHILIZED, FOR SOLUTION INTRAVENOUS at 11:42

## 2017-11-24 RX ADMIN — FENTANYL CITRATE 22.5 MICROGRAM(S)/KG/HR: 50 INJECTION INTRAVENOUS at 00:24

## 2017-11-24 NOTE — PROGRESS NOTE ADULT - PROBLEM SELECTOR PLAN 3
Cont BD with inhaled steroids and oral steroids  11/114: Pts wheezing has significantly improved: he will need long term steroids as he starts wheezing once his steroids are dced!  11/15: Wheezing has increased: started on IV steroids high dose by thoracic team: Would suggest to decrease to 20 mg three times a day !  11/16: decrease steroids today to 20 mg three times day  11/17: try to decrease steroids in AM  11/18 Duoneb. intubated for acute respiratory distress with hypoxia  11/19 KIARA. intubated and on ventilator support  11/20: cont vent support: start weaning as tolerated!  11/21: would decrease  his steroids to 30 mg a day from tomorrow  11/22: DECREASE STEROIDS: HE WHEEZES WHEN THE STEROIDS ARE TAPERED: BUT NEED TO BE LOWERED NOW AND WATCH!!  11/23: on 40 mg of prednisone: try to decrease and taper  11/24: prednisone decreased to 30 ,g a day !!

## 2017-11-24 NOTE — PROGRESS NOTE ADULT - ASSESSMENT
47 year old with sarcoidosis and COPD presents after recent andree surgery with recurrent hemoptysis.     This is liklely multifactorial- Cystic lung changes, sarcoid, ? superimposed infection many all be contributing.     His CT has peribronchial changes.    Completed bacterial course of coverage.    Consider continuing voriconazole pending fungal culture.  Fungal cultures can be lower yield- may be best to treat empirically for 6 more weeks.     Unclear that an ongoing fungal process is involved, but given his critic state- empiric coverage is a consideration.     Call the ID service with questions or concerns over the weekend.  [phone]

## 2017-11-24 NOTE — PROGRESS NOTE ADULT - PROBLEM SELECTOR PLAN 1
intubated: try to titrate as tolerated!  11/18 on ventilator support. Pt is sedated and paralyzed. defer ventilator Management/ weaning per CTICU team.  11/19 still sedated and paralyzed. Plan to wean off Nimbex today per Primary RN. ABGs reviewed.  11/20:ff paralyzing agent! But sedated !!  11/21: awake and alert: responding to simple commands: weaning trials  11/22: currently sedated: ? trach now?  11/23: for trach in AM  11/24: trach today

## 2017-11-24 NOTE — PROGRESS NOTE ADULT - ATTENDING COMMENTS
pt doing poorly  paralyzed, sedated but not requiring high FIO2 :   no bleeding noted:  try to wean sedation as well as paralyzing agent   Steroids have been decreased: goal would be rapidly tapered down steroids to minimum possible given h e is on sedation and paralyzing agent: But per reports pt gets very agitated when taken of paralyzing agent!    11/20: off paralyzing agent; cont current care: may decrease steroids to 30 mg in a day or two    11/21: off sedation : alert and awake and responds to simple commands; weaning trials!!  11/23: continues to be on ventilator: failed weaning attempts multiple times: for trach now: prognosis guarded!!  11/24: stable" but critical!!

## 2017-11-24 NOTE — CHART NOTE - NSCHARTNOTEFT_GEN_A_CORE
Consent was obtained explaining the risks and benefits of the procedure. The patient was placed supine with a roll under shoulders to hyperextend the neck.  The FiO2 was increased to 1.0 and endotracheal tube was adjusted under fiberoptic guidance to the highest position safely possible.  The bronchoscope was introduced into the distal end of the endotracheal tube to monitor the procedure.  Tracheal landmarks were identified and marked.  The procedure site was prepped and draped and the patient sedated on a propofol drip.  The overlying skin was anesthetized with 1% lidocaine with epinephrine and a thin gauge needle gradually inserted below the second tracheal ring in the midline until air was aspirated and it was visualized endoscopically.  A wire was then threaded into the trachea cephalad.  A small horizontal incision was made adjacent to the wire through the skin only.  The dilator was then introduced into the trachea.  Subsequently, a tracheostomy tube was inserted over the obturator and the position confirmed and the wire and obturator were removed and the cuff inflated.  The ventilator circuit was then switched from the endotracheal tube to the tracheostomy and adequate ventilation assured.  The endotracheal tube cuff was then deflated and the endotracheal tube removed.  The entire procedure was monitored with ventilator checks, vital signs, ECG, pulse oximetry and endoscopy.  No complications were witnessed. Minimal blood loss noted. Follow up chest x-ray ordered.      ----------------------------------------  Emilee Savage MD PGY-4  Pulmonary/Critical Care Fellow  Pager # [phone] (NS), [phone] (MIKAL)

## 2017-11-24 NOTE — PROGRESS NOTE ADULT - SUBJECTIVE AND OBJECTIVE BOX
CHIEF COMPLAINT:    Interval Events:    REVIEW OF SYSTEMS:  Constitutional: [ ] negative [ ] fevers [ ] chills [ ] weight loss [ ] weight gain  HEENT: [ ] negative [ ] dry eyes [ ] eye irritation [ ] postnasal drip [ ] nasal congestion  CV: [ ] negative  [ ] chest pain [ ] orthopnea [ ] palpitations [ ] murmur  Resp: [ ] negative [ ] cough [ ] shortness of breath [ ] dyspnea [ ] wheezing [ ] sputum [ ] hemoptysis  GI: [ ] negative [ ] nausea [ ] vomiting [ ] diarrhea [ ] constipation [ ] abd pain [ ] dysphagia   : [ ] negative [ ] dysuria [ ] nocturia [ ] hematuria [ ] increased urinary frequency  Musculoskeletal: [ ] negative [ ] back pain [ ] myalgias [ ] arthralgias [ ] fracture  Skin: [ ] negative [ ] rash [ ] itch  Neurological: [ ] negative [ ] headache [ ] dizziness [ ] syncope [ ] weakness [ ] numbness  Psychiatric: [ ] negative [ ] anxiety [ ] depression  Endocrine: [ ] negative [ ] diabetes [ ] thyroid problem  Hematologic/Lymphatic: [ ] negative [ ] anemia [ ] bleeding problem  Allergic/Immunologic: [ ] negative [ ] itchy eyes [ ] nasal discharge [ ] hives [ ] angioedema  [ ] All other systems negative  [ ] Unable to assess ROS because ________    OBJECTIVE:  ICU Vital Signs Last 24 Hrs  T(C): 37.1 (24 Nov 2017 04:00), Max: 37.1 (24 Nov 2017 04:00)  T(F): 98.7 (24 Nov 2017 04:00), Max: 98.7 (24 Nov 2017 04:00)  HR: 101 (24 Nov 2017 06:38) (84 - 130)  BP: 88/57 (23 Nov 2017 18:00) (88/57 - 138/89)  BP(mean): 64 (23 Nov 2017 18:00) (64 - 102)  ABP: 117/70 (24 Nov 2017 06:38) (84/53 - 175/104)  ABP(mean): 86 (24 Nov 2017 06:38) (63 - 128)  RR: 12 (24 Nov 2017 06:38) (12 - 22)  SpO2: 99% (24 Nov 2017 06:38) (96% - 100%)    Mode: AC/ CMV (Assist Control/ Continuous Mandatory Ventilation), RR (machine): 12, TV (machine): 450, FiO2: 40, PEEP: 5, MAP: 13, PIP: 48    11-22 @ 07:01 - 11-23 @ 07:00  --------------------------------------------------------  IN: 3299.4 mL / OUT: 1540 mL / NET: 1759.4 mL    11-23 @ 07:01 - 11-24 @ 06:42  --------------------------------------------------------  IN: 2359.7 mL / OUT: 1490 mL / NET: 869.7 mL      CAPILLARY BLOOD GLUCOSE      POCT Blood Glucose.: 92 mg/dL (24 Nov 2017 05:48)      PHYSICAL EXAM:  General:   HEENT:   Lymph Nodes:  Neck:   Respiratory:   Cardiovascular:   Abdomen:   Extremities:   Skin:   Neurological:  Psychiatry:    LINES:    HOSPITAL MEDICATIONS:  Standing Meds:  buDESOnide 160 MICROgram(s)/formoterol 4.5 MICROgram(s) Inhaler 2 Puff(s) Inhalation two times a day  chlorhexidine 4% Liquid 1 Application(s) Topical daily  fentaNYL   Infusion 3 MICROgram(s)/kG/Hr IV Continuous <Continuous>  ferrous    sulfate 325 milliGRAM(s) Oral three times a day with meals  folic acid 1 milliGRAM(s) Oral daily  insulin lispro (HumaLOG) corrective regimen sliding scale   SubCutaneous every 6 hours  ipratropium 17 MICROgram(s) HFA Inhaler 1 Puff(s) Inhalation every 6 hours  petrolatum Ophthalmic Ointment 1 Application(s) Both EYES two times a day  polyethylene glycol 3350 17 Gram(s) Oral at bedtime  predniSONE   Tablet 40 milliGRAM(s) Oral daily  propofol Infusion 30 MICROgram(s)/kG/Min IV Continuous <Continuous>  senna Syrup 5 milliLiter(s) Oral three times a day  valproic  acid Syrup 500 milliGRAM(s) Oral two times a day  voriconazole IVPB 300 milliGRAM(s) IV Intermittent every 12 hours      PRN Meds:  acetaminophen  IVPB. 1000 milliGRAM(s) IV Intermittent once PRN  ALBUTerol    90 MICROgram(s) HFA Inhaler 2 Puff(s) Inhalation every 6 hours PRN      LABS:                        8.1    6.91  )-----------( 83       ( 24 Nov 2017 04:23 )             26.7     Hgb Trend: 8.1<--, 7.8<--, 7.7<--, 8.1<--, 8.6<--  11-24    140  |  94<L>  |  15  ----------------------------<  89  4.5   |  40<H>  |  0.67    Ca    8.6      24 Nov 2017 04:23  Phos  2.8     11-24  Mg     2.0     11-24    TPro  5.3<L>  /  Alb  2.8<L>  /  TBili  < 0.2<L>  /  DBili  x   /  AST  12  /  ALT  11  /  AlkPhos  58  11-24    Creatinine Trend: 0.67<--, 0.56<--, 0.64<--, 0.70<--, 0.75<--, 0.62<--  PT/INR - ( 24 Nov 2017 04:23 )   PT: 12.8 SEC;   INR: 1.14          PTT - ( 24 Nov 2017 04:23 )  PTT:23.7 SEC    Arterial Blood Gas:  11-23 @ 04:00  7.38/69/147/37/99.6/14.3  ABG lactate: 1.3  Arterial Blood Gas:  11-22 @ 17:30  7.45/46/177/31/99.8/7.6  ABG lactate: --  Arterial Blood Gas:  11-22 @ 08:50  7.41/53/207/32/100/8.0  ABG lactate: --        MICROBIOLOGY:     Culture - Respiratory with Gram Stain (collected 23 Nov 2017 17:05)  Source: TRACHEAL ASPIRATE      RADIOLOGY:  [ ] Reviewed and interpreted by me    EKG: CHIEF COMPLAINT: Hemoptysis    Interval Events: no acute overnight events, planned for bedside trach today.     REVIEW OF SYSTEMS:  Constitutional: [ x] negative [ ] fevers [ ] chills [ ] weight loss [ ] weight gain  HEENT: [x ] negative [ ] dry eyes [ ] eye irritation [ ] postnasal drip [ ] nasal congestion  CV: x] negative  [ ] chest pain [ ] orthopnea [ ] palpitations [ ] murmur  Resp: [ ] negative [ ] cough [ x] shortness of breath [ ] dyspnea [ ] wheezing [ ] sputum [ ] hemoptysis  GI: [x ] negative [ ] nausea [ ] vomiting [ ] diarrhea [ ] constipation [ ] abd pain [ ] dysphagia   : [ x] negative [ ] dysuria [ ] nocturia [ ] hematuria [ ] increased urinary frequency  Musculoskeletal: [x ] negative [ ] back pain [ ] myalgias [ ] arthralgias [ ] fracture  Skin: [x] negative [ ] rash [ ] itch  Neurological: [x ] negative [ ] headache [ ] dizziness [ ] syncope [ ] weakness [ ] numbness  Psychiatric: [ x] negative [ ] anxiety [ ] depression  Endocrine: [ x] negative [ ] diabetes [ ] thyroid problem  Hematologic/Lymphatic: [ ] negative [ ] anemia [ ] bleeding problem  Allergic/Immunologic: [ ] negative [ ] itchy eyes [ ] nasal discharge [ ] hives [ ] angioedema  [ ] All other systems negative  [ ] Unable to assess ROS because ________    OBJECTIVE:  ICU Vital Signs Last 24 Hrs  T(C): 37.1 (24 Nov 2017 04:00), Max: 37.1 (24 Nov 2017 04:00)  T(F): 98.7 (24 Nov 2017 04:00), Max: 98.7 (24 Nov 2017 04:00)  HR: 101 (24 Nov 2017 06:38) (84 - 130)  BP: 88/57 (23 Nov 2017 18:00) (88/57 - 138/89)  BP(mean): 64 (23 Nov 2017 18:00) (64 - 102)  ABP: 117/70 (24 Nov 2017 06:38) (84/53 - 175/104)  ABP(mean): 86 (24 Nov 2017 06:38) (63 - 128)  RR: 12 (24 Nov 2017 06:38) (12 - 22)  SpO2: 99% (24 Nov 2017 06:38) (96% - 100%)    Mode: AC/ CMV (Assist Control/ Continuous Mandatory Ventilation), RR (machine): 12, TV (machine): 450, FiO2: 40, PEEP: 5, MAP: 13, PIP: 48    11-22 @ 07:01 - 11-23 @ 07:00  --------------------------------------------------------  IN: 3299.4 mL / OUT: 1540 mL / NET: 1759.4 mL    11-23 @ 07:01 - 11-24 @ 06:42  --------------------------------------------------------  IN: 2359.7 mL / OUT: 1490 mL / NET: 869.7 mL      CAPILLARY BLOOD GLUCOSE      POCT Blood Glucose.: 92 mg/dL (24 Nov 2017 05:48)      PHYSICAL EXAM:  General:   HEENT:   Lymph Nodes:  Neck:   Respiratory:   Cardiovascular:   Abdomen:   Extremities:   Skin:   Neurological:  Psychiatry:    LINES: PIV x 3, Pickens cath, R radial A line, ETT    HOSPITAL MEDICATIONS:  Standing Meds:  buDESOnide 160 MICROgram(s)/formoterol 4.5 MICROgram(s) Inhaler 2 Puff(s) Inhalation two times a day  chlorhexidine 4% Liquid 1 Application(s) Topical daily  fentaNYL   Infusion 3 MICROgram(s)/kG/Hr IV Continuous <Continuous>  ferrous    sulfate 325 milliGRAM(s) Oral three times a day with meals  folic acid 1 milliGRAM(s) Oral daily  insulin lispro (HumaLOG) corrective regimen sliding scale   SubCutaneous every 6 hours  ipratropium 17 MICROgram(s) HFA Inhaler 1 Puff(s) Inhalation every 6 hours  petrolatum Ophthalmic Ointment 1 Application(s) Both EYES two times a day  polyethylene glycol 3350 17 Gram(s) Oral at bedtime  predniSONE   Tablet 40 milliGRAM(s) Oral daily  propofol Infusion 30 MICROgram(s)/kG/Min IV Continuous <Continuous>  senna Syrup 5 milliLiter(s) Oral three times a day  valproic  acid Syrup 500 milliGRAM(s) Oral two times a day  voriconazole IVPB 300 milliGRAM(s) IV Intermittent every 12 hours      PRN Meds:  acetaminophen  IVPB. 1000 milliGRAM(s) IV Intermittent once PRN  ALBUTerol    90 MICROgram(s) HFA Inhaler 2 Puff(s) Inhalation every 6 hours PRN      LABS:                        8.1    6.91  )-----------( 83       ( 24 Nov 2017 04:23 )             26.7     Hgb Trend: 8.1<--, 7.8<--, 7.7<--, 8.1<--, 8.6<--  11-24    140  |  94<L>  |  15  ----------------------------<  89  4.5   |  40<H>  |  0.67    Ca    8.6      24 Nov 2017 04:23  Phos  2.8     11-24  Mg     2.0     11-24    TPro  5.3<L>  /  Alb  2.8<L>  /  TBili  < 0.2<L>  /  DBili  x   /  AST  12  /  ALT  11  /  AlkPhos  58  11-24    Creatinine Trend: 0.67<--, 0.56<--, 0.64<--, 0.70<--, 0.75<--, 0.62<--  PT/INR - ( 24 Nov 2017 04:23 )   PT: 12.8 SEC;   INR: 1.14          PTT - ( 24 Nov 2017 04:23 )  PTT:23.7 SEC    Arterial Blood Gas:  11-23 @ 04:00  7.38/69/147/37/99.6/14.3  ABG lactate: 1.3  Arterial Blood Gas:  11-22 @ 17:30  7.45/46/177/31/99.8/7.6  ABG lactate: --  Arterial Blood Gas:  11-22 @ 08:50  7.41/53/207/32/100/8.0  ABG lactate: --        MICROBIOLOGY:     Culture - Respiratory with Gram Stain (collected 23 Nov 2017 17:05)  Source: TRACHEAL ASPIRATE      RADIOLOGY:  [ ] Reviewed and interpreted by me    EKG:

## 2017-11-24 NOTE — PROGRESS NOTE ADULT - SUBJECTIVE AND OBJECTIVE BOX
Follow Up:      Inverval History/ROS:Patient is a 47y old  Male who presents with a chief complaint of hemoptysis (09 Nov 2017 00:32)    Intubated. Possible trach.    Increased secretions. Afebrile.      Allergies    No Known Allergies    Intolerances        ANTIMICROBIALS:  voriconazole IVPB 300 every 12 hours      OTHER MEDS:  acetaminophen  IVPB. 1000 milliGRAM(s) IV Intermittent once PRN  ALBUTerol    90 MICROgram(s) HFA Inhaler 2 Puff(s) Inhalation every 6 hours PRN  buDESOnide 160 MICROgram(s)/formoterol 4.5 MICROgram(s) Inhaler 2 Puff(s) Inhalation two times a day  chlorhexidine 4% Liquid 1 Application(s) Topical daily  fentaNYL   Infusion 3 MICROgram(s)/kG/Hr IV Continuous <Continuous>  ferrous    sulfate 325 milliGRAM(s) Oral three times a day with meals  folic acid 1 milliGRAM(s) Oral daily  heparin  Injectable 5000 Unit(s) SubCutaneous every 8 hours  insulin lispro (HumaLOG) corrective regimen sliding scale   SubCutaneous every 6 hours  ipratropium 17 MICROgram(s) HFA Inhaler 1 Puff(s) Inhalation every 6 hours  petrolatum Ophthalmic Ointment 1 Application(s) Both EYES two times a day  polyethylene glycol 3350 17 Gram(s) Oral at bedtime  predniSONE   Tablet 40 milliGRAM(s) Oral daily  propofol Infusion 30 MICROgram(s)/kG/Min IV Continuous <Continuous>  senna Syrup 5 milliLiter(s) Oral three times a day  valproic  acid Syrup 500 milliGRAM(s) Oral two times a day      Vital Signs Last 24 Hrs  T(C): 36.2 (24 Nov 2017 08:00), Max: 37.1 (24 Nov 2017 04:00)  T(F): 97.1 (24 Nov 2017 08:00), Max: 98.7 (24 Nov 2017 04:00)  HR: 90 (24 Nov 2017 11:12) (84 - 112)  BP: 85/53 (24 Nov 2017 11:00) (85/53 - 142/92)  BP(mean): 59 (24 Nov 2017 11:00) (59 - 103)  RR: 12 (24 Nov 2017 11:00) (12 - 14)  SpO2: 97% (24 Nov 2017 11:12) (96% - 100%)    PHYSICAL EXAM:  General: [x ] intubated  HEAD/EYES: [ ] PERRL [x ] white sclera [ ] icterus  ENT:  [ ] normal [x ] supple [ ] thrush [ ] pharyngeal exudate  Cardiovascular:   [ ] murmur [x ] normal [ ] PPM/AICD  Respiratory:  [x ] clear to ausculation bilaterally  GI:  [ x] soft, non-tender, normal bowel sounds  :  [ ] mock [ ] no CVA tenderness   Musculoskeletal:  [ ] no synovitis  Neurologic:  [ ] non-focal exam   Skin:  [ x] no rash  Lymph: [ ] no lymphadenopathy  Psychiatric:  [ ] appropriate affect [ ] alert & oriented  Lines:  [x ] no phlebitis [ ] central line                                8.1    6.91  )-----------( 83       ( 24 Nov 2017 04:23 )             26.7       11-24    140  |  94<L>  |  15  ----------------------------<  89  4.5   |  40<H>  |  0.67    Ca    8.6      24 Nov 2017 04:23  Phos  2.8     11-24  Mg     2.0     11-24    TPro  5.3<L>  /  Alb  2.8<L>  /  TBili  < 0.2<L>  /  DBili  x   /  AST  12  /  ALT  11  /  AlkPhos  58  11-24          MICROBIOLOGY:Culture - Respiratory:   CULTURE IN PROGRESS, FURTHER REPORT TO FOLLOW. (11-23-17 @ 17:05)      RADIOLOGY:

## 2017-11-24 NOTE — PROGRESS NOTE ADULT - PROBLEM SELECTOR PLAN 2
? 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 ot from bronchiectasis? eliquis have been dced: needs card to se: In addition, he may need IR help with embolization, it his hemoptysis increases: He has had pleurodesis on the left side.  11/10 quantify hemoptysis. no AC. stable  11/11 no more hemoptysis. off AC  11/12 resolved. off AC  11/13: today had hemoptysis again----->localize site---> IR embolization: left a message for thoracic team  11/14: DW Dr Caldera: for repeat bronchoscopy to localize the site of bleeding and need IR help to embolize the culprit vessel!  11/15: started on broad spectrum antibiotics and for bronchoscopy today  11/16: s/p IR emobolization done: monitor for more hemoptysis  11/17: s/p bronchsocopy: reportedly old blood seen in airways!  11/18 s/p bronchoscopy in ICU via ETT. old clot. no active bleeding  11/19 stable. no active bleeding per recent bronchoscopy  11/20; off AC at this time  11/22: resolved: remained off ac  11/23: remained off AC: has hx of PAF  11/24: remains off AC

## 2017-11-24 NOTE — PROGRESS NOTE ADULT - ASSESSMENT
47M with hx of sarcoidosis, started on Eqliuis for cerebrovascular infarct with PFO on echo and paroxysmal afib in September c/b recurrent hemoptysis (Eliquis d/c'd on 9/20, then restarted between 10/23-27 after hemoptysis improved), d/c'd to rehab then returned 11/9 for recurrent hemoptysis requiring intubation and CTICU admission for management of hemoptysis including IR embolization (11/16) and serial bronch (11/17 and 11/20) in CTICU with no active bleeding visualized, now remains intubated with difficulty weaning vent as during CPAP trials pt fails to initiate breaths, unclear etiology. Transferred to MICU from CTICU for continued vent management and attempted weaning. Planned for bedside trach today as unable to tolerate CPAP trials or extubation.     # Neuro:  -on propofol gtt at 30mcg/kg/min  -Fentanyl gtt for pain control    #Cardiovascular:  -Currently holding BP off pressors x 24 hours, though has been on phenylephrine infusion (0.25-1 mcg/kg/min)    #Respiratory:  -Continues to fail CPAP trials as does not initiate breath, poor tidal volumes, becomes anxious and tachypneic. Possibly related to recent paralytics vs critical illness myopathy vs diminished respiratory drive  -Failed trial of extubation 2 days ago due to tachypnea and poor tidal volume, reintubated and found to be auto-peeping, I:E ratio adjusted to allow for full exhalation with improvement, ABGs improved.   -Continue bronchodilators and pulmonary toilet  -CT Surgery to discuss with transplant team at [facility] whether pt is possible candidate for lung transplant  -Continued L apex airspace; per CT surgery continue to monitor, no intervention at this time  -Consented for bedside trach today    #GI:  -OGT feeds held overnight for trach today    #Renal:  -Monitor I's and O's    #Heme:  -H/H stable; baseline Hb 9-10    #ID:  -Aspergilloma dx 9/24/17, on voriconazole x 6 weeks per ID recommendations. Recommend continuing voriconazole given hemoptysis recurred after was stopped, possible related to inflammation 2/2 aspergilloma and possible continued infection    #Endocrine:   -c/w accu-checks with ISS coverage  -repeat electrolytes PRN    #DVT PPX: SCDs, holding heparin at this point given low platelets    RONAL Cantu, PGY3  MICU  [id number]

## 2017-11-24 NOTE — CHART NOTE - NSCHARTNOTEFT_GEN_A_CORE
Indication: Bronchoscopic guided percutaneous tracheostomy. Bronchoscopy with BAL    Operators: Ar Mahmood MD, Jay Mandel MD, Zaheer Medina MD    Findings:  Bronchoscope passed through ET tube. Percutaneous tracheostomy performed under continuos bronchoscopic guidance.     Post tracheostomy, bronchoscope inserted through Tracheostomy tube. Trach noted to be in good position. Airway evaluation revealed extensive secretions. All subsegments patent except the LB1-LB2 (Previous segmentectomy). BAL performed in the RML with 60 cc of NS. Bronchoscope then withdrawn from tracheostomy.     No immediate post procedure complications.    Specimens: Sent to the lab.

## 2017-11-24 NOTE — PROGRESS NOTE ADULT - SUBJECTIVE AND OBJECTIVE BOX
Patient is a 47y old  Male who presents with a chief complaint of hemoptysis (09 Nov 2017 00:32)  remains intubated: for trach today!    Any change in ROS:     MEDICATIONS  (STANDING):  buDESOnide 160 MICROgram(s)/formoterol 4.5 MICROgram(s) Inhaler 2 Puff(s) Inhalation two times a day  chlorhexidine 4% Liquid 1 Application(s) Topical daily  cisatracurium Injectable 20 milliGRAM(s) IV Push once  fentaNYL    Injectable 100 MICROGram(s) IV Push once  fentaNYL   Infusion 3 MICROgram(s)/kG/Hr (22.5 mL/Hr) IV Continuous <Continuous>  ferrous    sulfate 325 milliGRAM(s) Oral three times a day with meals  folic acid 1 milliGRAM(s) Oral daily  heparin  Injectable 5000 Unit(s) SubCutaneous every 8 hours  insulin lispro (HumaLOG) corrective regimen sliding scale   SubCutaneous every 6 hours  ipratropium 17 MICROgram(s) HFA Inhaler 1 Puff(s) Inhalation every 6 hours  LORazepam   Injectable 4 milliGRAM(s) IV Push once  LORazepam   Injectable 4 milliGRAM(s) IntraMuscular once  midazolam Injectable 4 milliGRAM(s) IV Push once  petrolatum Ophthalmic Ointment 1 Application(s) Both EYES two times a day  polyethylene glycol 3350 17 Gram(s) Oral at bedtime  predniSONE   Tablet 30 milliGRAM(s) Oral daily  propofol Infusion 30 MICROgram(s)/kG/Min (13.5 mL/Hr) IV Continuous <Continuous>  propofol Injectable 50 milliGRAM(s) IV Push once  propofol Injectable 50 milliGRAM(s) IV Push once  senna Syrup 5 milliLiter(s) Oral three times a day  valproic  acid Syrup 500 milliGRAM(s) Oral two times a day  voriconazole IVPB 300 milliGRAM(s) IV Intermittent every 12 hours    MEDICATIONS  (PRN):  acetaminophen  IVPB. 1000 milliGRAM(s) IV Intermittent once PRN Moderate Pain (4 - 6)  ALBUTerol    90 MICROgram(s) HFA Inhaler 2 Puff(s) Inhalation every 6 hours PRN Shortness of Breath and/or Wheezing    Vital Signs Last 24 Hrs  T(C): 36.9 (24 Nov 2017 12:00), Max: 37.1 (24 Nov 2017 04:00)  T(F): 98.5 (24 Nov 2017 12:00), Max: 98.7 (24 Nov 2017 04:00)  HR: 87 (24 Nov 2017 15:42) (65 - 110)  BP: 124/80 (24 Nov 2017 15:04) (85/53 - 142/92)  BP(mean): 90 (24 Nov 2017 15:04) (59 - 103)  RR: 29 (24 Nov 2017 15:35) (11 - 29)  SpO2: 100% (24 Nov 2017 15:42) (97% - 100%)  Mode: AC/ CMV (Assist Control/ Continuous Mandatory Ventilation)  RR (machine): 16  TV (machine): 450  FiO2: 40  PEEP: 5  MAP: 13  PIP: 48    I&O's Summary    23 Nov 2017 07:01  -  24 Nov 2017 07:00  --------------------------------------------------------  IN: 2449.7 mL / OUT: 1650 mL / NET: 799.7 mL    24 Nov 2017 07:01  -  24 Nov 2017 16:08  --------------------------------------------------------  IN: 496.8 mL / OUT: 205 mL / NET: 291.8 mL          Physical Exam:   GENERAL: NAD, well-groomed, well-developed  HEENT: GIOVANI/   Atraumatic, Normocephalic  ENMT: No tonsillar erythema, exudates, or enlargement; Moist mucous membranes, Good dentition, No lesions  NECK: Supple, No JVD, Normal thyroid  CHEST/LUNG: Coarse breath sounds  CVS: Regular rate and rhythm; No murmurs, rubs, or gallops  GI: : Soft, Nontender, Nondistended; Bowel sounds present  NERVOUS SYSTEM:  open his eyes   EXTREMITIES:  2+ Peripheral Pulses, No clubbing, cyanosis, or edema  LYMPH: No lymphadenopathy noted  SKIN: No rashes or lesions  ENDOCRINOLOGY: No Thyromegaly  PSYCH: calm    Labs:  ABG - ( 23 Nov 2017 04:00 )  pH: 7.38  /  pCO2: 69    /  pO2: 147   / HCO3: 37    / Base Excess: 14.3  /  SaO2: 99.6                          8.1    6.91  )-----------( 83       ( 24 Nov 2017 04:23 )             26.7                         7.8    8.52  )-----------( 85       ( 23 Nov 2017 04:00 )             24.2                         7.7    8.16  )-----------( 71       ( 22 Nov 2017 04:00 )             24.3                         8.1    9.30  )-----------( 57       ( 21 Nov 2017 04:00 )             25.0     11-24    140  |  94<L>  |  15  ----------------------------<  89  4.5   |  40<H>  |  0.67  11-23    138  |  93<L>  |  13  ----------------------------<  186<H>  4.6   |  40<H>  |  0.56  11-22    138  |  93<L>  |  18  ----------------------------<  173<H>  4.4   |  34<H>  |  0.64  11-21    139  |  96<L>  |  18  ----------------------------<  145<H>  4.6   |  35<H>  |  0.70    Ca    8.6      24 Nov 2017 04:23  Ca    8.2<L>      23 Nov 2017 04:00  Phos  2.8     11-24  Phos  3.4     11-23  Mg     2.0     11-24  Mg     2.2     11-23    TPro  5.3<L>  /  Alb  2.8<L>  /  TBili  < 0.2<L>  /  DBili  x   /  AST  12  /  ALT  11  /  AlkPhos  58  11-24  TPro  5.1<L>  /  Alb  2.9<L>  /  TBili  < 0.2<L>  /  DBili  x   /  AST  11  /  ALT  11  /  AlkPhos  61  11-23  TPro  5.7<L>  /  Alb  3.0<L>  /  TBili  < 0.2<L>  /  DBili  x   /  AST  9   /  ALT  9   /  AlkPhos  58  11-21    CAPILLARY BLOOD GLUCOSE      POCT Blood Glucose.: 100 mg/dL (24 Nov 2017 11:55)  POCT Blood Glucose.: 92 mg/dL (24 Nov 2017 05:48)  POCT Blood Glucose.: 89 mg/dL (24 Nov 2017 00:14)  POCT Blood Glucose.: 155 mg/dL (23 Nov 2017 17:16)      LIVER FUNCTIONS - ( 24 Nov 2017 04:23 )  Alb: 2.8 g/dL / Pro: 5.3 g/dL / ALK PHOS: 58 u/L / ALT: 11 u/L / AST: 12 u/L / GGT: x           PT/INR - ( 24 Nov 2017 04:23 )   PT: 12.8 SEC;   INR: 1.14          PTT - ( 24 Nov 2017 04:23 )  PTT:23.7 SEC    Cultures:           Wound culture:                11-23 @ 17:05  Organism --  Culture w/ gram stain --  Specimen Source TRACHEAL ASPIRATE      Abscess culture:             11-23 @ 17:05  Organism --  Gram Stain --  Specimen Source TRACHEAL ASPIRATE        Tissue culture:           11-23 @ 17:05  Organism --  Gram Stain --  Specimen Source TRACHEAL ASPIRATE      Body Fluid Smear & Culture:                        11-23 @ 17:05  AFB Smear  --  Culture Acid Fast Body Fluid w/ Smear  --  Culture Acid Fast Smear Concentrated   --    Culture Results:     --  Specimen Source TRACHEAL ASPIRATE              Studies  Chest X-RAY  CT SCAN Chest   Venous Dopplers: LE:   CT Abdomen  Others        < from: Xray Chest 1 View AP- PORTABLE-Urgent (11.22.17 @ 09:01) >        INTERPRETATION:  TIME OF EXAM: November 22, 2017 at 8:48 AM    CLINICAL INFORMATION: Post extubation and reintubation.    TECHNIQUE:   Portable chest    INTERPRETATION:     An endotracheal tube is seen in satisfactory position.  No interval   change in suspected left pneumothorax with chronic changes in both lungs   with associated bulla formation. Left IJ line and enteric tube in place.      COMPARISON:  November 21      IMPRESSION:  Follow-up study post reintubation.                  RANDALL AMADOR M.D.; ATTENDING RADIOLOGIST  This document has been electronically signed. Nov 22 2017  2:04PM        < end of copied text >

## 2017-11-25 DIAGNOSIS — R50.9 FEVER, UNSPECIFIED: ICD-10-CM

## 2017-11-25 LAB
ALBUMIN SERPL ELPH-MCNC: 2.5 G/DL — LOW (ref 3.3–5)
ALP SERPL-CCNC: 56 U/L — SIGNIFICANT CHANGE UP (ref 40–120)
ALT FLD-CCNC: 6 U/L — SIGNIFICANT CHANGE UP (ref 4–41)
APPEARANCE UR: SIGNIFICANT CHANGE UP
APTT BLD: 24.1 SEC — LOW (ref 27.5–37.4)
AST SERPL-CCNC: 12 U/L — SIGNIFICANT CHANGE UP (ref 4–40)
BACTERIA # UR AUTO: HIGH
BACTERIA SPT RESP CULT: SIGNIFICANT CHANGE UP
BASOPHILS # BLD AUTO: 0.01 K/UL — SIGNIFICANT CHANGE UP (ref 0–0.2)
BASOPHILS NFR BLD AUTO: 0.1 % — SIGNIFICANT CHANGE UP (ref 0–2)
BILIRUB SERPL-MCNC: 0.2 MG/DL — SIGNIFICANT CHANGE UP (ref 0.2–1.2)
BILIRUB UR-MCNC: NEGATIVE — SIGNIFICANT CHANGE UP
BLOOD UR QL VISUAL: HIGH
BUN SERPL-MCNC: 14 MG/DL — SIGNIFICANT CHANGE UP (ref 7–23)
CALCIUM SERPL-MCNC: 8.3 MG/DL — LOW (ref 8.4–10.5)
CHLORIDE SERPL-SCNC: 92 MMOL/L — LOW (ref 98–107)
CO2 SERPL-SCNC: 37 MMOL/L — HIGH (ref 22–31)
COLOR SPEC: YELLOW — SIGNIFICANT CHANGE UP
CREAT SERPL-MCNC: 0.65 MG/DL — SIGNIFICANT CHANGE UP (ref 0.5–1.3)
EOSINOPHIL # BLD AUTO: 0.01 K/UL — SIGNIFICANT CHANGE UP (ref 0–0.5)
EOSINOPHIL NFR BLD AUTO: 0.1 % — SIGNIFICANT CHANGE UP (ref 0–6)
GLUCOSE BLDC GLUCOMTR-MCNC: 100 MG/DL — HIGH (ref 70–99)
GLUCOSE BLDC GLUCOMTR-MCNC: 102 MG/DL — HIGH (ref 70–99)
GLUCOSE BLDC GLUCOMTR-MCNC: 103 MG/DL — HIGH (ref 70–99)
GLUCOSE BLDC GLUCOMTR-MCNC: 106 MG/DL — HIGH (ref 70–99)
GLUCOSE BLDC GLUCOMTR-MCNC: 110 MG/DL — HIGH (ref 70–99)
GLUCOSE BLDC GLUCOMTR-MCNC: 111 MG/DL — HIGH (ref 70–99)
GLUCOSE BLDC GLUCOMTR-MCNC: 118 MG/DL — HIGH (ref 70–99)
GLUCOSE BLDC GLUCOMTR-MCNC: 127 MG/DL — HIGH (ref 70–99)
GLUCOSE BLDC GLUCOMTR-MCNC: 132 MG/DL — HIGH (ref 70–99)
GLUCOSE BLDC GLUCOMTR-MCNC: 163 MG/DL — HIGH (ref 70–99)
GLUCOSE BLDC GLUCOMTR-MCNC: 52 MG/DL — LOW (ref 70–99)
GLUCOSE BLDC GLUCOMTR-MCNC: 61 MG/DL — LOW (ref 70–99)
GLUCOSE BLDC GLUCOMTR-MCNC: 77 MG/DL — SIGNIFICANT CHANGE UP (ref 70–99)
GLUCOSE BLDC GLUCOMTR-MCNC: 81 MG/DL — SIGNIFICANT CHANGE UP (ref 70–99)
GLUCOSE BLDC GLUCOMTR-MCNC: 82 MG/DL — SIGNIFICANT CHANGE UP (ref 70–99)
GLUCOSE BLDC GLUCOMTR-MCNC: 97 MG/DL — SIGNIFICANT CHANGE UP (ref 70–99)
GLUCOSE SERPL-MCNC: 92 MG/DL — SIGNIFICANT CHANGE UP (ref 70–99)
GLUCOSE UR-MCNC: 150 — SIGNIFICANT CHANGE UP
HCT VFR BLD CALC: 24.7 % — LOW (ref 39–50)
HCT VFR BLD CALC: 26.1 % — LOW (ref 39–50)
HGB BLD-MCNC: 7.7 G/DL — LOW (ref 13–17)
HGB BLD-MCNC: 8.2 G/DL — LOW (ref 13–17)
IMM GRANULOCYTES # BLD AUTO: 0.09 # — SIGNIFICANT CHANGE UP
IMM GRANULOCYTES NFR BLD AUTO: 1.3 % — SIGNIFICANT CHANGE UP (ref 0–1.5)
INR BLD: 1.28 — HIGH (ref 0.88–1.17)
KETONES UR-MCNC: SIGNIFICANT CHANGE UP
LEUKOCYTE ESTERASE UR-ACNC: HIGH
LYMPHOCYTES # BLD AUTO: 1.36 K/UL — SIGNIFICANT CHANGE UP (ref 1–3.3)
LYMPHOCYTES # BLD AUTO: 19.4 % — SIGNIFICANT CHANGE UP (ref 13–44)
MAGNESIUM SERPL-MCNC: 2 MG/DL — SIGNIFICANT CHANGE UP (ref 1.6–2.6)
MCHC RBC-ENTMCNC: 30.6 PG — SIGNIFICANT CHANGE UP (ref 27–34)
MCHC RBC-ENTMCNC: 30.8 PG — SIGNIFICANT CHANGE UP (ref 27–34)
MCHC RBC-ENTMCNC: 31.2 % — LOW (ref 32–36)
MCHC RBC-ENTMCNC: 31.4 % — LOW (ref 32–36)
MCV RBC AUTO: 97.4 FL — SIGNIFICANT CHANGE UP (ref 80–100)
MCV RBC AUTO: 98.8 FL — SIGNIFICANT CHANGE UP (ref 80–100)
MONOCYTES # BLD AUTO: 0.68 K/UL — SIGNIFICANT CHANGE UP (ref 0–0.9)
MONOCYTES NFR BLD AUTO: 9.7 % — SIGNIFICANT CHANGE UP (ref 2–14)
MUCOUS THREADS # UR AUTO: SIGNIFICANT CHANGE UP
NEUTROPHILS # BLD AUTO: 4.87 K/UL — SIGNIFICANT CHANGE UP (ref 1.8–7.4)
NEUTROPHILS NFR BLD AUTO: 69.4 % — SIGNIFICANT CHANGE UP (ref 43–77)
NITRITE UR-MCNC: POSITIVE — HIGH
NRBC # FLD: 0 — SIGNIFICANT CHANGE UP
NRBC # FLD: 0.03 — SIGNIFICANT CHANGE UP
PH UR: 6.5 — SIGNIFICANT CHANGE UP (ref 4.6–8)
PHOSPHATE SERPL-MCNC: 3.1 MG/DL — SIGNIFICANT CHANGE UP (ref 2.5–4.5)
PLATELET # BLD AUTO: 84 K/UL — LOW (ref 150–400)
PLATELET # BLD AUTO: 94 K/UL — LOW (ref 150–400)
PMV BLD: 13.8 FL — HIGH (ref 7–13)
PMV BLD: 13.8 FL — HIGH (ref 7–13)
POTASSIUM SERPL-MCNC: 4 MMOL/L — SIGNIFICANT CHANGE UP (ref 3.5–5.3)
POTASSIUM SERPL-SCNC: 4 MMOL/L — SIGNIFICANT CHANGE UP (ref 3.5–5.3)
PROT SERPL-MCNC: 4.9 G/DL — LOW (ref 6–8.3)
PROT UR-MCNC: 100 — SIGNIFICANT CHANGE UP
PROTHROM AB SERPL-ACNC: 14.4 SEC — HIGH (ref 9.8–13.1)
RBC # BLD: 2.5 M/UL — LOW (ref 4.2–5.8)
RBC # BLD: 2.68 M/UL — LOW (ref 4.2–5.8)
RBC # FLD: 15.2 % — HIGH (ref 10.3–14.5)
RBC # FLD: 15.4 % — HIGH (ref 10.3–14.5)
RBC CASTS # UR COMP ASSIST: >50 — HIGH (ref 0–?)
SODIUM SERPL-SCNC: 136 MMOL/L — SIGNIFICANT CHANGE UP (ref 135–145)
SP GR SPEC: 1.03 — SIGNIFICANT CHANGE UP (ref 1–1.03)
UROBILINOGEN FLD QL: 1 E.U. — SIGNIFICANT CHANGE UP (ref 0.1–0.2)
WBC # BLD: 11.11 K/UL — HIGH (ref 3.8–10.5)
WBC # BLD: 7.02 K/UL — SIGNIFICANT CHANGE UP (ref 3.8–10.5)
WBC # FLD AUTO: 11.11 K/UL — HIGH (ref 3.8–10.5)
WBC # FLD AUTO: 7.02 K/UL — SIGNIFICANT CHANGE UP (ref 3.8–10.5)
WBC CLUMPS #/AREA URNS HPF: PRESENT — HIGH (ref 0–?)
WBC UR QL: >50 — HIGH (ref 0–?)

## 2017-11-25 PROCEDURE — 99291 CRITICAL CARE FIRST HOUR: CPT

## 2017-11-25 PROCEDURE — 71010: CPT | Mod: 26

## 2017-11-25 RX ORDER — FENTANYL CITRATE 50 UG/ML
3 INJECTION INTRAVENOUS
Qty: 2500 | Refills: 0 | Status: DISCONTINUED | OUTPATIENT
Start: 2017-11-25 | End: 2017-11-26

## 2017-11-25 RX ORDER — VANCOMYCIN HCL 1 G
1000 VIAL (EA) INTRAVENOUS ONCE
Qty: 0 | Refills: 0 | Status: COMPLETED | OUTPATIENT
Start: 2017-11-25 | End: 2017-11-25

## 2017-11-25 RX ORDER — SODIUM CHLORIDE 9 MG/ML
1000 INJECTION, SOLUTION INTRAVENOUS
Qty: 0 | Refills: 0 | Status: DISCONTINUED | OUTPATIENT
Start: 2017-11-25 | End: 2017-11-25

## 2017-11-25 RX ORDER — PHENYLEPHRINE HCL 0.25 %
1 AEROSOL, SPRAY WITH PUMP (ML) NASAL ONCE
Qty: 0 | Refills: 0 | Status: DISCONTINUED | OUTPATIENT
Start: 2017-11-25 | End: 2017-11-25

## 2017-11-25 RX ORDER — TETRACAINE/BENZOCAINE/BUTAMBEN 2%-14%-2%
1 OINTMENT (GRAM) TOPICAL ONCE
Qty: 0 | Refills: 0 | Status: COMPLETED | OUTPATIENT
Start: 2017-11-25 | End: 2017-11-25

## 2017-11-25 RX ORDER — ACETAMINOPHEN 500 MG
1000 TABLET ORAL ONCE
Qty: 0 | Refills: 0 | Status: COMPLETED | OUTPATIENT
Start: 2017-11-25 | End: 2017-11-25

## 2017-11-25 RX ORDER — DEXTROSE 50 % IN WATER 50 %
50 SYRINGE (ML) INTRAVENOUS ONCE
Qty: 0 | Refills: 0 | Status: COMPLETED | OUTPATIENT
Start: 2017-11-25 | End: 2017-11-25

## 2017-11-25 RX ORDER — PIPERACILLIN AND TAZOBACTAM 4; .5 G/20ML; G/20ML
3.38 INJECTION, POWDER, LYOPHILIZED, FOR SOLUTION INTRAVENOUS EVERY 8 HOURS
Qty: 0 | Refills: 0 | Status: DISCONTINUED | OUTPATIENT
Start: 2017-11-25 | End: 2017-11-28

## 2017-11-25 RX ORDER — ACETAMINOPHEN 500 MG
650 TABLET ORAL ONCE
Qty: 0 | Refills: 0 | Status: COMPLETED | OUTPATIENT
Start: 2017-11-25 | End: 2017-11-25

## 2017-11-25 RX ADMIN — VORICONAZOLE 125 MILLIGRAM(S): 10 INJECTION, POWDER, LYOPHILIZED, FOR SOLUTION INTRAVENOUS at 11:10

## 2017-11-25 RX ADMIN — BUDESONIDE AND FORMOTEROL FUMARATE DIHYDRATE 2 PUFF(S): 160; 4.5 AEROSOL RESPIRATORY (INHALATION) at 09:59

## 2017-11-25 RX ADMIN — Medication 1 PUFF(S): at 04:20

## 2017-11-25 RX ADMIN — Medication 1 APPLICATION(S): at 05:40

## 2017-11-25 RX ADMIN — FENTANYL CITRATE 30 MICROGRAM(S)/KG/HR: 50 INJECTION INTRAVENOUS at 05:41

## 2017-11-25 RX ADMIN — SENNA PLUS 5 MILLILITER(S): 8.6 TABLET ORAL at 21:31

## 2017-11-25 RX ADMIN — FENTANYL CITRATE 22.5 MICROGRAM(S)/KG/HR: 50 INJECTION INTRAVENOUS at 19:41

## 2017-11-25 RX ADMIN — Medication 1 PUFF(S): at 09:59

## 2017-11-25 RX ADMIN — Medication 27.5 MILLIGRAM(S): at 01:19

## 2017-11-25 RX ADMIN — PROPOFOL 22.5 MICROGRAM(S)/KG/MIN: 10 INJECTION, EMULSION INTRAVENOUS at 05:41

## 2017-11-25 RX ADMIN — HEPARIN SODIUM 5000 UNIT(S): 5000 INJECTION INTRAVENOUS; SUBCUTANEOUS at 14:11

## 2017-11-25 RX ADMIN — Medication 500 MILLIGRAM(S): at 17:51

## 2017-11-25 RX ADMIN — Medication 1 PUFF(S): at 22:37

## 2017-11-25 RX ADMIN — PIPERACILLIN AND TAZOBACTAM 25 GRAM(S): 4; .5 INJECTION, POWDER, LYOPHILIZED, FOR SOLUTION INTRAVENOUS at 14:50

## 2017-11-25 RX ADMIN — PROPOFOL 22.5 MICROGRAM(S)/KG/MIN: 10 INJECTION, EMULSION INTRAVENOUS at 21:32

## 2017-11-25 RX ADMIN — PIPERACILLIN AND TAZOBACTAM 25 GRAM(S): 4; .5 INJECTION, POWDER, LYOPHILIZED, FOR SOLUTION INTRAVENOUS at 21:33

## 2017-11-25 RX ADMIN — Medication 7.03 MICROGRAM(S)/KG/MIN: at 10:59

## 2017-11-25 RX ADMIN — Medication 650 MILLIGRAM(S): at 07:59

## 2017-11-25 RX ADMIN — Medication 50 MILLILITER(S): at 05:35

## 2017-11-25 RX ADMIN — CHLORHEXIDINE GLUCONATE 1 APPLICATION(S): 213 SOLUTION TOPICAL at 13:39

## 2017-11-25 RX ADMIN — PROPOFOL 22.5 MICROGRAM(S)/KG/MIN: 10 INJECTION, EMULSION INTRAVENOUS at 19:41

## 2017-11-25 RX ADMIN — PROPOFOL 22.5 MICROGRAM(S)/KG/MIN: 10 INJECTION, EMULSION INTRAVENOUS at 07:51

## 2017-11-25 RX ADMIN — SENNA PLUS 5 MILLILITER(S): 8.6 TABLET ORAL at 14:11

## 2017-11-25 RX ADMIN — POLYETHYLENE GLYCOL 3350 17 GRAM(S): 17 POWDER, FOR SOLUTION ORAL at 21:31

## 2017-11-25 RX ADMIN — FENTANYL CITRATE 1 PATCH: 50 INJECTION INTRAVENOUS at 04:05

## 2017-11-25 RX ADMIN — Medication 30 MILLIGRAM(S): at 14:11

## 2017-11-25 RX ADMIN — FENTANYL CITRATE 22.5 MICROGRAM(S)/KG/HR: 50 INJECTION INTRAVENOUS at 07:51

## 2017-11-25 RX ADMIN — Medication 1 APPLICATION(S): at 17:55

## 2017-11-25 RX ADMIN — Medication 1 SPRAY(S): at 14:35

## 2017-11-25 RX ADMIN — SODIUM CHLORIDE 75 MILLILITER(S): 9 INJECTION, SOLUTION INTRAVENOUS at 08:55

## 2017-11-25 RX ADMIN — Medication 325 MILLIGRAM(S): at 17:51

## 2017-11-25 RX ADMIN — HEPARIN SODIUM 5000 UNIT(S): 5000 INJECTION INTRAVENOUS; SUBCUTANEOUS at 05:40

## 2017-11-25 RX ADMIN — Medication 400 MILLIGRAM(S): at 12:09

## 2017-11-25 RX ADMIN — Medication 1 PUFF(S): at 16:01

## 2017-11-25 RX ADMIN — Medication 250 MILLIGRAM(S): at 13:43

## 2017-11-25 RX ADMIN — SODIUM CHLORIDE 75 MILLILITER(S): 9 INJECTION, SOLUTION INTRAVENOUS at 19:41

## 2017-11-25 RX ADMIN — Medication 50 MILLILITER(S): at 11:00

## 2017-11-25 RX ADMIN — HEPARIN SODIUM 5000 UNIT(S): 5000 INJECTION INTRAVENOUS; SUBCUTANEOUS at 21:31

## 2017-11-25 RX ADMIN — SODIUM CHLORIDE 50 MILLILITER(S): 9 INJECTION, SOLUTION INTRAVENOUS at 05:40

## 2017-11-25 RX ADMIN — BUDESONIDE AND FORMOTEROL FUMARATE DIHYDRATE 2 PUFF(S): 160; 4.5 AEROSOL RESPIRATORY (INHALATION) at 22:37

## 2017-11-25 RX ADMIN — SODIUM CHLORIDE 50 MILLILITER(S): 9 INJECTION, SOLUTION INTRAVENOUS at 07:52

## 2017-11-25 NOTE — PROGRESS NOTE ADULT - ATTENDING COMMENTS
Mr. Dickens was evaluated on rounds in the MICU.  his assessment is recorded in the MICU resident's note.  1- Propofol/Fentanyl for sedation/analgesia.  2- S/P trachestomy on MV in A/C mode with adequate ventilation.  Daily attempts to lower MV support.  Bronchodilators to continue as ordered.  3- Levophed intermittently required to maintain MAP above 65.  4- Enteral feedings  5- Vorionazole to continue as ordered.  6- GI/DVT prophylaxis.

## 2017-11-25 NOTE — PROGRESS NOTE ADULT - PROBLEM SELECTOR PLAN 2
intubated: try to titrate as tolerated!  11/18 on ventilator support. Pt is sedated and paralyzed. defer ventilator Management/ weaning per CTICU team.  11/19 still sedated and paralyzed. Plan to wean off Nimbex today per Primary RN. ABGs reviewed.  11/20:ff paralyzing agent! But sedated !!  11/21: awake and alert: responding to simple commands: weaning trials  11/22: currently sedated: ? trach now?  11/23: for trach in AM  11/24: trach today  11/25: S/P trach yesterday: chest x-ray noted: Cont full vent support and wean if tolerates

## 2017-11-25 NOTE — PROGRESS NOTE ADULT - PROBLEM SELECTOR PLAN 4
Cont BD with inhaled steroids and oral steroids  11/114: Pts wheezing has significantly improved: he will need long term steroids as he starts wheezing once his steroids are dced!  11/15: Wheezing has increased: started on IV steroids high dose by thoracic team: Would suggest to decrease to 20 mg three times a day !  11/16: decrease steroids today to 20 mg three times day  11/17: try to decrease steroids in AM  11/18 Duoneb. intubated for acute respiratory distress with hypoxia  11/19 KIARA. intubated and on ventilator support  11/20: cont vent support: start weaning as tolerated!  11/21: would decrease  his steroids to 30 mg a day from tomorrow  11/22: DECREASE STEROIDS: HE WHEEZES WHEN THE STEROIDS ARE TAPERED: BUT NEED TO BE LOWERED NOW AND WATCH!!  11/23: on 40 mg of prednisone: try to decrease and taper  11/24: prednisone decreased to 30 ,g a day !!  11/25: on 30 mg a day now:

## 2017-11-25 NOTE — PROGRESS NOTE ADULT - SUBJECTIVE AND OBJECTIVE BOX
Patient is a 47y old  Male who presents with a chief complaint of hemoptysis (09 Nov 2017 00:32)  s/p tracheostomy  now febrile       Any change in ROS:     MEDICATIONS  (STANDING):  acetaminophen  IVPB 1000 milliGRAM(s) IV Intermittent once  buDESOnide 160 MICROgram(s)/formoterol 4.5 MICROgram(s) Inhaler 2 Puff(s) Inhalation two times a day  chlorhexidine 4% Liquid 1 Application(s) Topical daily  dextrose 5% + sodium chloride 0.9%. 1000 milliLiter(s) (75 mL/Hr) IV Continuous <Continuous>  fentaNYL   Infusion 3 MICROgram(s)/kG/Hr (22.5 mL/Hr) IV Continuous <Continuous>  fentaNYL   Patch 100 MICROgram(s)/Hr. 1 Patch Transdermal every 48 hours  ferrous    sulfate 325 milliGRAM(s) Oral three times a day with meals  folic acid 1 milliGRAM(s) Oral daily  heparin  Injectable 5000 Unit(s) SubCutaneous every 8 hours  insulin lispro (HumaLOG) corrective regimen sliding scale   SubCutaneous every 6 hours  ipratropium 17 MICROgram(s) HFA Inhaler 1 Puff(s) Inhalation every 6 hours  norepinephrine Infusion 0.05 MICROgram(s)/kG/Min (7.031 mL/Hr) IV Continuous <Continuous>  petrolatum Ophthalmic Ointment 1 Application(s) Both EYES two times a day  piperacillin/tazobactam IVPB. 3.375 Gram(s) IV Intermittent every 8 hours  polyethylene glycol 3350 17 Gram(s) Oral at bedtime  predniSONE   Tablet 30 milliGRAM(s) Oral daily  propofol Infusion 50 MICROgram(s)/kG/Min (22.5 mL/Hr) IV Continuous <Continuous>  senna Syrup 5 milliLiter(s) Oral three times a day  valproic  acid Syrup 500 milliGRAM(s) Oral two times a day  vancomycin  IVPB 1000 milliGRAM(s) IV Intermittent once  voriconazole IVPB 300 milliGRAM(s) IV Intermittent every 12 hours    MEDICATIONS  (PRN):  acetaminophen  IVPB. 1000 milliGRAM(s) IV Intermittent once PRN Moderate Pain (4 - 6)  ALBUTerol    90 MICROgram(s) HFA Inhaler 2 Puff(s) Inhalation every 6 hours PRN Shortness of Breath and/or Wheezing    Vital Signs Last 24 Hrs  T(C): 38.3 (25 Nov 2017 09:30), Max: 38.6 (25 Nov 2017 08:00)  T(F): 101 (25 Nov 2017 09:30), Max: 101.5 (25 Nov 2017 08:00)  HR: 123 (25 Nov 2017 11:30) (65 - 123)  BP: 111/64 (25 Nov 2017 08:00) (89/61 - 124/80)  BP(mean): 72 (25 Nov 2017 08:00) (66 - 90)  RR: 24 (25 Nov 2017 11:30) (0 - 29)  SpO2: 95% (25 Nov 2017 11:30) (93% - 100%)  Mode: AC/ CMV (Assist Control/ Continuous Mandatory Ventilation)  RR (machine): 12  TV (machine): 450  FiO2: 40  PEEP: 8  MAP: 13  PIP: 40    I&O's Summary    24 Nov 2017 07:01  -  25 Nov 2017 07:00  --------------------------------------------------------  IN: 1883.8 mL / OUT: 1235 mL / NET: 648.8 mL    25 Nov 2017 07:01  -  25 Nov 2017 11:51  --------------------------------------------------------  IN: 206.9 mL / OUT: 50 mL / NET: 156.9 mL          Physical Exam:   GENERAL: NAD, well-groomed, well-developed  HEENT: GIOVANI/   Atraumatic, Normocephalic  ENMT: No tonsillar erythema, exudates, or enlargement; Moist mucous membranes, Good dentition, No lesions  NECK: Supple, No JVD, Normal thyroid  CHEST/LUNG: Coarse rhonchi++  CVS: Regular rate and rhythm; No murmurs, rubs, or gallops  GI: : Soft, Nontender, Nondistended; Bowel sounds present  NERVOUS SYSTEM:  awake but lethargic  EXTREMITIES:  2+ Peripheral Pulses, No clubbing, cyanosis, or edema  LYMPH: No lymphadenopathy noted  SKIN: No rashes or lesions  ENDOCRINOLOGY: No Thyromegaly  PSYCH: calm    Labs:                              7.7    7.02  )-----------( 84       ( 25 Nov 2017 02:30 )             24.7                         8.1    6.91  )-----------( 83       ( 24 Nov 2017 04:23 )             26.7                         7.8    8.52  )-----------( 85       ( 23 Nov 2017 04:00 )             24.2                         7.7    8.16  )-----------( 71       ( 22 Nov 2017 04:00 )             24.3     11-25    136  |  92<L>  |  14  ----------------------------<  92  4.0   |  37<H>  |  0.65  11-24    140  |  94<L>  |  15  ----------------------------<  89  4.5   |  40<H>  |  0.67  11-23    138  |  93<L>  |  13  ----------------------------<  186<H>  4.6   |  40<H>  |  0.56  11-22    138  |  93<L>  |  18  ----------------------------<  173<H>  4.4   |  34<H>  |  0.64    Ca    8.3<L>      25 Nov 2017 02:30  Ca    8.6      24 Nov 2017 04:23  Phos  3.1     11-25  Phos  2.8     11-24  Mg     2.0     11-25  Mg     2.0     11-24    TPro  4.9<L>  /  Alb  2.5<L>  /  TBili  0.2  /  DBili  x   /  AST  12  /  ALT  6   /  AlkPhos  56  11-25  TPro  5.3<L>  /  Alb  2.8<L>  /  TBili  < 0.2<L>  /  DBili  x   /  AST  12  /  ALT  11  /  AlkPhos  58  11-24  TPro  5.1<L>  /  Alb  2.9<L>  /  TBili  < 0.2<L>  /  DBili  x   /  AST  11  /  ALT  11  /  AlkPhos  61  11-23    CAPILLARY BLOOD GLUCOSE      POCT Blood Glucose.: 132 mg/dL (25 Nov 2017 11:22)  POCT Blood Glucose.: 77 mg/dL (25 Nov 2017 10:29)  POCT Blood Glucose.: 82 mg/dL (25 Nov 2017 09:24)  POCT Blood Glucose.: 81 mg/dL (25 Nov 2017 08:21)  POCT Blood Glucose.: 102 mg/dL (25 Nov 2017 07:20)  POCT Blood Glucose.: 97 mg/dL (25 Nov 2017 05:51)  POCT Blood Glucose.: 52 mg/dL (25 Nov 2017 05:32)  POCT Blood Glucose.: 61 mg/dL (25 Nov 2017 05:29)  POCT Blood Glucose.: 106 mg/dL (25 Nov 2017 01:40)  POCT Blood Glucose.: 100 mg/dL (25 Nov 2017 00:06)  POCT Blood Glucose.: 59 mg/dL (24 Nov 2017 23:45)  POCT Blood Glucose.: 93 mg/dL (24 Nov 2017 17:25)  POCT Blood Glucose.: 100 mg/dL (24 Nov 2017 11:55)      LIVER FUNCTIONS - ( 25 Nov 2017 02:30 )  Alb: 2.5 g/dL / Pro: 4.9 g/dL / ALK PHOS: 56 u/L / ALT: 6 u/L / AST: 12 u/L / GGT: x           PT/INR - ( 25 Nov 2017 06:40 )   PT: 14.4 SEC;   INR: 1.28          PTT - ( 25 Nov 2017 06:40 )  PTT:24.1 SEC    Cultures:           Wound culture:                11-23 @ 17:05  Organism --  Culture w/ gram stain --  Specimen Source TRACHEAL ASPIRATE      Abscess culture:             11-23 @ 17:05  Organism --  Gram Stain --  Specimen Source TRACHEAL ASPIRATE        Tissue culture:           11-23 @ 17:05  Organism --  Gram Stain --  Specimen Source TRACHEAL ASPIRATE      Body Fluid Smear & Culture:                        11-23 @ 17:05  AFB Smear  --  Culture Acid Fast Body Fluid w/ Smear  --  Culture Acid Fast Smear Concentrated   --    Culture Results:     --  Specimen Source TRACHEAL ASPIRATE        < from: Xray Chest 1 View AP- PORTABLE-Urgent (11.24.17 @ 21:22) >    EXAM:  RAD CHEST PORTABLE URGENT        PROCEDURE DATE:  Nov 24 2017         INTERPRETATION:  COMPARISON: 11/22    CLINICAL INDICATION: Status post tracheostomy. History of sarcoid, no   pneumothorax versus bulla on the left    Impression:  Portable chest dated 11/24 at 9:09 PM shows a tracheostomy tube in place.  No change in bilateral architectural distortion and bullous changes.  Stable left apical pneumothorax.   Surgical clips left hilum.                      JULIETTE SMITH M.D., ATTENDING RADIOLOGIST  This document has been electronically signed. Nov 25 2017 11:49AM        < end of copied text >        Studies  Chest X-RAY  CT SCAN Chest   Venous Dopplers: LE:   CT Abdomen  Others

## 2017-11-25 NOTE — PROGRESS NOTE ADULT - ATTENDING COMMENTS
pt doing poorly  paralyzed, sedated but not requiring high FIO2 :   no bleeding noted:  try to wean sedation as well as paralyzing agent   Steroids have been decreased: goal would be rapidly tapered down steroids to minimum possible given h e is on sedation and paralyzing agent: But per reports pt gets very agitated when taken of paralyzing agent!    11/20: off paralyzing agent; cont current care: may decrease steroids to 30 mg in a day or two    11/21: off sedation : alert and awake and responds to simple commands; weaning trials!!  11/23: continues to be on ventilator: failed weaning attempts multiple times: for trach now: prognosis guarded!!  11/24: stable" but critical!!  11/25: NEW FEVER: PANCULTURE AS WELL AS EMPIRIC ANTIBIOTICS:

## 2017-11-25 NOTE — PROGRESS NOTE ADULT - ASSESSMENT
47M with hx of sarcoidosis, started on Eqliuis for cerebrovascular infarct with PFO on echo and paroxysmal afib in September c/b recurrent hemoptysis (Eliquis d/c'd on 9/20, then restarted between 10/23-27 after hemoptysis improved), d/c'd to rehab then returned 11/9 for recurrent hemoptysis requiring intubation and CTICU admission for management of hemoptysis including IR embolization (11/16) and serial bronch (11/17 and 11/20) in CTICU with no active bleeding visualized, now remains intubated with difficulty weaning vent as during CPAP trials pt fails to initiate breaths, unclear etiology. Transferred to MICU from CTICU for continued vent management and attempted weaning. Bedside trach placed yesterday, then had episode of hypoxia and bleeding around trach site after suctioning, resolved after surgicel and pressure.     # Neuro:  -on propofol gtt at 30mcg/kg/min  -Fentanyl gtt for pain control    #Cardiovascular:  -Currently holding BP off pressors x 24 hours, though has been on phenylephrine infusion (0.25-1 mcg/kg/min)    #Respiratory:  -Continues to fail CPAP trials as does not initiate breath, poor tidal volumes, becomes anxious and tachypneic. Possibly related to recent paralytics vs critical illness myopathy vs diminished respiratory drive  -Failed trial of extubation 2 days ago due to tachypnea and poor tidal volume, reintubated and found to be auto-peeping, I:E ratio adjusted to allow for full exhalation with improvement, ABGs improved.   -Continue bronchodilators and pulmonary toilet  -CT Surgery to discuss with transplant team at [place] whether pt is possible candidate for lung transplant  -Continued L apex airspace; per CT surgery continue to monitor, no intervention at this time  -Consented for bedside trach today    #GI:  -OGT feeds held overnight for trach today    #Renal:  -Monitor I's and O's    #Heme:  -H/H stable; baseline Hb 9-10    #ID:  -Aspergilloma dx 9/24/17, on voriconazole x 6 weeks per ID recommendations. Recommend continuing voriconazole given hemoptysis recurred after was stopped, possible related to inflammation 2/2 aspergilloma and possible continued infection    #Endocrine:   -hypoglycemia, unclear etiology. Started on D5 at 50cc/hr, given 2 amps D50, monitor FS  -repeat electrolytes PRN    #DVT PPX: HSQ    RONAL Cantu, PGY3  MICU  [id number] 47M with hx of sarcoidosis, started on Eqliuis for cerebrovascular infarct with PFO on echo and paroxysmal afib in September c/b recurrent hemoptysis (Eliquis d/c'd on 9/20, then restarted between 10/23-27 after hemoptysis improved), d/c'd to rehab then returned 11/9 for recurrent hemoptysis requiring intubation and CTICU admission for management of hemoptysis including IR embolization (11/16) and serial bronch (11/17 and 11/20) in CTICU with no active bleeding visualized, now remains intubated with difficulty weaning vent as during CPAP trials pt fails to initiate breaths, unclear etiology. Transferred to MICU from CTICU for continued vent management and attempted weaning. Bedside trach placed yesterday, then had episode of hypoxia and bleeding around trach site after suctioning, resolved after surgicel and pressure.     # Neuro:  -on propofol gtt at 30mcg/kg/min  -Fentanyl gtt for pain control    #Cardiovascular:  -Currently holding BP off pressors.  -Monitor BP     #Respiratory:  -Continues to fail CPAP trials as does not initiate breath, poor tidal volumes, becomes anxious and tachypneic. Possibly related to recent paralytics vs critical illness myopathy vs diminished respiratory drive  -Failed trial of extubation 2 days ago due to tachypnea and poor tidal volume, reintubated and found to be auto-peeping, I:E ratio adjusted to allow for full exhalation with improvement, ABGs improved.   -Continue bronchodilators and pulmonary toilet  -CT Surgery to discuss with transplant team at [place] whether pt is possible candidate for lung transplant  -Continued L apex airspace; per CT surgery continue to monitor, no intervention at this time  -Consented for bedside trach today    #GI:  -OGT feeds held overnight for trach today    #Renal:  -Monitor I's and O's    #Heme:  -H/H stable; baseline Hb 9-10    #ID:  -Aspergilloma dx 9/24/17, on voriconazole x 6 weeks per ID recommendations. Recommend continuing voriconazole given hemoptysis recurred after was stopped, possible related to inflammation 2/2 aspergilloma and possible continued infection    #Endocrine:   -hypoglycemia, unclear etiology. Started on D5 at 50cc/hr, given 2 amps D50, monitor FS  -repeat electrolytes PRN    #DVT PPX: HSQ    S. Carp, PGY3  MICU  [id number] 47M with hx of sarcoidosis, started on Eqliuis for cerebrovascular infarct with PFO on echo and paroxysmal afib in September c/b recurrent hemoptysis (Eliquis d/c'd on 9/20, then restarted between 10/23-27 after hemoptysis improved), d/c'd to rehab then returned 11/9 for recurrent hemoptysis requiring intubation and CTICU admission for management of hemoptysis including IR embolization (11/16) and serial bronch (11/17 and 11/20) in CTICU with no active bleeding visualized, now remains intubated with difficulty weaning vent as during CPAP trials pt fails to initiate breaths, unclear etiology. Transferred to MICU from CTICU for continued vent management and attempted weaning. Bedside trach placed yesterday, then had episode of hypoxia and bleeding around trach site after suctioning, resolved after surgicel and pressure.     # Neuro:  -on propofol gtt at 30mcg/kg/min  -Fentanyl gtt for pain control    #Cardiovascular:  -Currently holding BP off pressors.  -Monitor BP     #Respiratory:  -Continues to fail CPAP trials as does not initiate breath, poor tidal volumes, becomes anxious and tachypneic. Possibly related to recent paralytics vs critical illness myopathy vs diminished respiratory drive  -Failed trial of extubation 2 days ago due to tachypnea and poor tidal volume, reintubated and found to be auto-peeping, I:E ratio adjusted to allow for full exhalation with improvement, ABGs improved.   -Continue bronchodilators and pulmonary toilet  -Continued L apex airspace; per CT surgery continue to monitor, no intervention at this time    #GI:  -OGT feeds held overnight  -Restarted feeds today via new KO tube     #Renal:  -Monitor I's and O's    #Heme:  -H/H stable; baseline Hb 9-10  -stable thrombocytopenia    #ID:  -Aspergilloma dx 9/24/17, on voriconazole x 6 weeks per ID recommendations. Recommend continuing voriconazole given hemoptysis recurred after was stopped, possible related to inflammation 2/2 aspergilloma and possible continued infection    #Endocrine:   -hypoglycemia, unclear etiology. Started on D5 at 50cc/hr, given 2 amps D50, monitor FS q1hr  -repeat electrolytes PRN    #DVT PPX: HSQ    S. Alondra, PGY3  MICU  [id number]

## 2017-11-26 LAB
ALBUMIN SERPL ELPH-MCNC: 2.4 G/DL — LOW (ref 3.3–5)
ALP SERPL-CCNC: 64 U/L — SIGNIFICANT CHANGE UP (ref 40–120)
ALT FLD-CCNC: 7 U/L — SIGNIFICANT CHANGE UP (ref 4–41)
APTT BLD: 27.8 SEC — SIGNIFICANT CHANGE UP (ref 27.5–37.4)
AST SERPL-CCNC: 12 U/L — SIGNIFICANT CHANGE UP (ref 4–40)
BASOPHILS # BLD AUTO: 0.01 K/UL — SIGNIFICANT CHANGE UP (ref 0–0.2)
BASOPHILS NFR BLD AUTO: 0.1 % — SIGNIFICANT CHANGE UP (ref 0–2)
BILIRUB SERPL-MCNC: 0.5 MG/DL — SIGNIFICANT CHANGE UP (ref 0.2–1.2)
BUN SERPL-MCNC: 9 MG/DL — SIGNIFICANT CHANGE UP (ref 7–23)
CALCIUM SERPL-MCNC: 8.4 MG/DL — SIGNIFICANT CHANGE UP (ref 8.4–10.5)
CHLORIDE SERPL-SCNC: 96 MMOL/L — LOW (ref 98–107)
CO2 SERPL-SCNC: 38 MMOL/L — HIGH (ref 22–31)
CREAT SERPL-MCNC: 0.72 MG/DL — SIGNIFICANT CHANGE UP (ref 0.5–1.3)
EOSINOPHIL # BLD AUTO: 0 K/UL — SIGNIFICANT CHANGE UP (ref 0–0.5)
EOSINOPHIL NFR BLD AUTO: 0 % — SIGNIFICANT CHANGE UP (ref 0–6)
GLUCOSE BLDC GLUCOMTR-MCNC: 117 MG/DL — HIGH (ref 70–99)
GLUCOSE BLDC GLUCOMTR-MCNC: 119 MG/DL — HIGH (ref 70–99)
GLUCOSE BLDC GLUCOMTR-MCNC: 129 MG/DL — HIGH (ref 70–99)
GLUCOSE BLDC GLUCOMTR-MCNC: 157 MG/DL — HIGH (ref 70–99)
GLUCOSE SERPL-MCNC: 151 MG/DL — HIGH (ref 70–99)
HCT VFR BLD CALC: 23.7 % — LOW (ref 39–50)
HGB BLD-MCNC: 7.8 G/DL — LOW (ref 13–17)
IMM GRANULOCYTES # BLD AUTO: 0.17 # — SIGNIFICANT CHANGE UP
IMM GRANULOCYTES NFR BLD AUTO: 1 % — SIGNIFICANT CHANGE UP (ref 0–1.5)
INR BLD: 1.42 — HIGH (ref 0.88–1.17)
LYMPHOCYTES # BLD AUTO: 1.07 K/UL — SIGNIFICANT CHANGE UP (ref 1–3.3)
LYMPHOCYTES # BLD AUTO: 6.2 % — LOW (ref 13–44)
MAGNESIUM SERPL-MCNC: 2 MG/DL — SIGNIFICANT CHANGE UP (ref 1.6–2.6)
MCHC RBC-ENTMCNC: 32.1 PG — SIGNIFICANT CHANGE UP (ref 27–34)
MCHC RBC-ENTMCNC: 32.9 % — SIGNIFICANT CHANGE UP (ref 32–36)
MCV RBC AUTO: 97.5 FL — SIGNIFICANT CHANGE UP (ref 80–100)
METHOD TYPE: SIGNIFICANT CHANGE UP
MONOCYTES # BLD AUTO: 1.44 K/UL — HIGH (ref 0–0.9)
MONOCYTES NFR BLD AUTO: 8.4 % — SIGNIFICANT CHANGE UP (ref 2–14)
NEUTROPHILS # BLD AUTO: 14.53 K/UL — HIGH (ref 1.8–7.4)
NEUTROPHILS NFR BLD AUTO: 84.3 % — HIGH (ref 43–77)
NRBC # FLD: 0 — SIGNIFICANT CHANGE UP
ORGANISM # SPEC MICROSCOPIC CNT: SIGNIFICANT CHANGE UP
ORGANISM # SPEC MICROSCOPIC CNT: SIGNIFICANT CHANGE UP
PHOSPHATE SERPL-MCNC: 3.9 MG/DL — SIGNIFICANT CHANGE UP (ref 2.5–4.5)
PLATELET # BLD AUTO: 115 K/UL — LOW (ref 150–400)
PMV BLD: 12.8 FL — SIGNIFICANT CHANGE UP (ref 7–13)
POTASSIUM SERPL-MCNC: 4.1 MMOL/L — SIGNIFICANT CHANGE UP (ref 3.5–5.3)
POTASSIUM SERPL-SCNC: 4.1 MMOL/L — SIGNIFICANT CHANGE UP (ref 3.5–5.3)
PROT SERPL-MCNC: 5 G/DL — LOW (ref 6–8.3)
PROTHROM AB SERPL-ACNC: 16 SEC — HIGH (ref 9.8–13.1)
RBC # BLD: 2.43 M/UL — LOW (ref 4.2–5.8)
RBC # FLD: 15 % — HIGH (ref 10.3–14.5)
SODIUM SERPL-SCNC: 142 MMOL/L — SIGNIFICANT CHANGE UP (ref 135–145)
SPECIMEN SOURCE: SIGNIFICANT CHANGE UP
WBC # BLD: 17.22 K/UL — HIGH (ref 3.8–10.5)
WBC # FLD AUTO: 17.22 K/UL — HIGH (ref 3.8–10.5)

## 2017-11-26 PROCEDURE — 99291 CRITICAL CARE FIRST HOUR: CPT

## 2017-11-26 RX ORDER — ACETAMINOPHEN 500 MG
650 TABLET ORAL EVERY 6 HOURS
Qty: 0 | Refills: 0 | Status: DISCONTINUED | OUTPATIENT
Start: 2017-11-26 | End: 2017-12-30

## 2017-11-26 RX ORDER — VANCOMYCIN HCL 1 G
1000 VIAL (EA) INTRAVENOUS EVERY 12 HOURS
Qty: 0 | Refills: 0 | Status: DISCONTINUED | OUTPATIENT
Start: 2017-11-26 | End: 2017-11-28

## 2017-11-26 RX ORDER — PROPOFOL 10 MG/ML
50 INJECTION, EMULSION INTRAVENOUS
Qty: 1000 | Refills: 0 | Status: DISCONTINUED | OUTPATIENT
Start: 2017-11-26 | End: 2017-11-27

## 2017-11-26 RX ADMIN — PIPERACILLIN AND TAZOBACTAM 25 GRAM(S): 4; .5 INJECTION, POWDER, LYOPHILIZED, FOR SOLUTION INTRAVENOUS at 05:04

## 2017-11-26 RX ADMIN — Medication 1: at 18:23

## 2017-11-26 RX ADMIN — PIPERACILLIN AND TAZOBACTAM 25 GRAM(S): 4; .5 INJECTION, POWDER, LYOPHILIZED, FOR SOLUTION INTRAVENOUS at 21:57

## 2017-11-26 RX ADMIN — BUDESONIDE AND FORMOTEROL FUMARATE DIHYDRATE 2 PUFF(S): 160; 4.5 AEROSOL RESPIRATORY (INHALATION) at 22:47

## 2017-11-26 RX ADMIN — FENTANYL CITRATE 22.5 MICROGRAM(S)/KG/HR: 50 INJECTION INTRAVENOUS at 08:02

## 2017-11-26 RX ADMIN — Medication 1 APPLICATION(S): at 05:04

## 2017-11-26 RX ADMIN — Medication 1 PUFF(S): at 22:47

## 2017-11-26 RX ADMIN — Medication 1 PUFF(S): at 09:43

## 2017-11-26 RX ADMIN — Medication 500 MILLIGRAM(S): at 05:04

## 2017-11-26 RX ADMIN — SENNA PLUS 5 MILLILITER(S): 8.6 TABLET ORAL at 21:57

## 2017-11-26 RX ADMIN — Medication 500 MILLIGRAM(S): at 18:11

## 2017-11-26 RX ADMIN — Medication 650 MILLIGRAM(S): at 11:57

## 2017-11-26 RX ADMIN — Medication 325 MILLIGRAM(S): at 08:02

## 2017-11-26 RX ADMIN — PROPOFOL 22.5 MICROGRAM(S)/KG/MIN: 10 INJECTION, EMULSION INTRAVENOUS at 08:01

## 2017-11-26 RX ADMIN — VORICONAZOLE 125 MILLIGRAM(S): 10 INJECTION, POWDER, LYOPHILIZED, FOR SOLUTION INTRAVENOUS at 22:30

## 2017-11-26 RX ADMIN — Medication 7.03 MICROGRAM(S)/KG/MIN: at 02:00

## 2017-11-26 RX ADMIN — POLYETHYLENE GLYCOL 3350 17 GRAM(S): 17 POWDER, FOR SOLUTION ORAL at 21:57

## 2017-11-26 RX ADMIN — Medication 1 PUFF(S): at 15:36

## 2017-11-26 RX ADMIN — PIPERACILLIN AND TAZOBACTAM 25 GRAM(S): 4; .5 INJECTION, POWDER, LYOPHILIZED, FOR SOLUTION INTRAVENOUS at 13:45

## 2017-11-26 RX ADMIN — BUDESONIDE AND FORMOTEROL FUMARATE DIHYDRATE 2 PUFF(S): 160; 4.5 AEROSOL RESPIRATORY (INHALATION) at 09:43

## 2017-11-26 RX ADMIN — VORICONAZOLE 125 MILLIGRAM(S): 10 INJECTION, POWDER, LYOPHILIZED, FOR SOLUTION INTRAVENOUS at 02:00

## 2017-11-26 RX ADMIN — HEPARIN SODIUM 5000 UNIT(S): 5000 INJECTION INTRAVENOUS; SUBCUTANEOUS at 05:04

## 2017-11-26 RX ADMIN — PROPOFOL 22.5 MICROGRAM(S)/KG/MIN: 10 INJECTION, EMULSION INTRAVENOUS at 18:11

## 2017-11-26 RX ADMIN — CHLORHEXIDINE GLUCONATE 1 APPLICATION(S): 213 SOLUTION TOPICAL at 11:57

## 2017-11-26 RX ADMIN — Medication 30 MILLIGRAM(S): at 11:57

## 2017-11-26 RX ADMIN — Medication 250 MILLIGRAM(S): at 18:11

## 2017-11-26 RX ADMIN — FENTANYL CITRATE 3 MICROGRAM(S)/KG/HR: 50 INJECTION INTRAVENOUS at 08:02

## 2017-11-26 RX ADMIN — Medication 325 MILLIGRAM(S): at 18:11

## 2017-11-26 RX ADMIN — VORICONAZOLE 125 MILLIGRAM(S): 10 INJECTION, POWDER, LYOPHILIZED, FOR SOLUTION INTRAVENOUS at 11:06

## 2017-11-26 RX ADMIN — HEPARIN SODIUM 5000 UNIT(S): 5000 INJECTION INTRAVENOUS; SUBCUTANEOUS at 21:57

## 2017-11-26 RX ADMIN — Medication 1 MILLIGRAM(S): at 11:57

## 2017-11-26 RX ADMIN — HEPARIN SODIUM 5000 UNIT(S): 5000 INJECTION INTRAVENOUS; SUBCUTANEOUS at 13:20

## 2017-11-26 RX ADMIN — SENNA PLUS 5 MILLILITER(S): 8.6 TABLET ORAL at 05:04

## 2017-11-26 RX ADMIN — Medication 325 MILLIGRAM(S): at 11:57

## 2017-11-26 RX ADMIN — SENNA PLUS 5 MILLILITER(S): 8.6 TABLET ORAL at 13:20

## 2017-11-26 RX ADMIN — Medication 650 MILLIGRAM(S): at 20:23

## 2017-11-26 RX ADMIN — Medication 1 APPLICATION(S): at 18:11

## 2017-11-26 RX ADMIN — Medication 1 PUFF(S): at 04:22

## 2017-11-26 NOTE — PROGRESS NOTE ADULT - ATTENDING COMMENTS
Critically ill male on vent with hypoxemic/hypercapnic respiratory failure. Underlying sarcoidosis admitted with hemoptysis in setting of aspergilloma s/p resection. Unable to wean s/p trach on 11/24. Wean off IV sedation. Fevers - started on empiric Zosyn. Follow up cultures.

## 2017-11-26 NOTE — PROGRESS NOTE ADULT - SUBJECTIVE AND OBJECTIVE BOX
CHIEF COMPLAINT: Hemoptysis    Interval Events: Hypoglycemia improved after starting tube feeds, taken off D5. Repeat cultures drawn due to fevers, likely urinary source. No fevers overnight but did require levophed x several hours for low BPs.     REVIEW OF SYSTEMS:  Constitutional: [ ] negative [ ] fevers [ ] chills [ ] weight loss [ ] weight gain  HEENT: [ ] negative [ ] dry eyes [ ] eye irritation [ ] postnasal drip [ ] nasal congestion  CV: [ ] negative  [ ] chest pain [ ] orthopnea [ ] palpitations [ ] murmur  Resp: [ ] negative [ ] cough [ ] shortness of breath [ ] dyspnea [ ] wheezing [ ] sputum [ ] hemoptysis  GI: [ ] negative [ ] nausea [ ] vomiting [ ] diarrhea [ ] constipation [ ] abd pain [ ] dysphagia   : [ ] negative [ ] dysuria [ ] nocturia [ ] hematuria [ ] increased urinary frequency  Musculoskeletal: [ ] negative [ ] back pain [ ] myalgias [ ] arthralgias [ ] fracture  Skin: [ ] negative [ ] rash [ ] itch  Neurological: [ ] negative [ ] headache [ ] dizziness [ ] syncope [ ] weakness [ ] numbness  Psychiatric: [ ] negative [ ] anxiety [ ] depression  Endocrine: [ ] negative [ ] diabetes [ ] thyroid problem  Hematologic/Lymphatic: [ ] negative [ ] anemia [ ] bleeding problem  Allergic/Immunologic: [ ] negative [ ] itchy eyes [ ] nasal discharge [ ] hives [ ] angioedema  [ ] All other systems negative  [ ] Unable to assess ROS because ________    OBJECTIVE:  ICU Vital Signs Last 24 Hrs  T(C): 36.3 (2017 04:00), Max: 39.6 (2017 12:00)  T(F): 97.4 (2017 04:00), Max: 103.3 (2017 12:00)  HR: 108 (2017 06:00) (88 - 123)  BP: 118/69 (2017 20:00) (111/64 - 118/69)  BP(mean): 82 (2017 20:00) (72 - 82)  ABP: 160/85 (2017 06:00) (66/40 - 160/85)  ABP(mean): 107 (2017 06:00) (50 - 108)  RR: 18 (2017 06:00) (0 - 24)  SpO2: 98% (2017 06:00) (93% - 100%)    Mode: AC/ CMV (Assist Control/ Continuous Mandatory Ventilation), RR (machine): 12, TV (machine): 450, FiO2: 40, PEEP: 8, MAP: 13, PIP: 35     @ 07:01   @ 07:00  --------------------------------------------------------  IN: 1883.8 mL / OUT: 1235 mL / NET: 648.8 mL     @ 07:  -   @ 06:53  --------------------------------------------------------  IN: 2257.9 mL / OUT: 2650 mL / NET: -392.1 mL      CAPILLARY BLOOD GLUCOSE      POCT Blood Glucose.: 129 mg/dL (2017 05:07)      PHYSICAL EXAM:  General:   HEENT:   Lymph Nodes:  Neck:   Respiratory:   Cardiovascular:   Abdomen:   Extremities:   Skin:   Neurological:  Psychiatry:    LINES:    HOSPITAL MEDICATIONS:  Standing Meds:  buDESOnide 160 MICROgram(s)/formoterol 4.5 MICROgram(s) Inhaler 2 Puff(s) Inhalation two times a day  chlorhexidine 4% Liquid 1 Application(s) Topical daily  fentaNYL   Infusion 3 MICROgram(s)/kG/Hr IV Continuous <Continuous>  fentaNYL   Patch 100 MICROgram(s)/Hr. 1 Patch Transdermal every 48 hours  ferrous    sulfate 325 milliGRAM(s) Oral three times a day with meals  folic acid 1 milliGRAM(s) Oral daily  heparin  Injectable 5000 Unit(s) SubCutaneous every 8 hours  insulin lispro (HumaLOG) corrective regimen sliding scale   SubCutaneous every 6 hours  ipratropium 17 MICROgram(s) HFA Inhaler 1 Puff(s) Inhalation every 6 hours  norepinephrine Infusion 0.05 MICROgram(s)/kG/Min IV Continuous <Continuous>  petrolatum Ophthalmic Ointment 1 Application(s) Both EYES two times a day  piperacillin/tazobactam IVPB. 3.375 Gram(s) IV Intermittent every 8 hours  polyethylene glycol 3350 17 Gram(s) Oral at bedtime  predniSONE   Tablet 30 milliGRAM(s) Oral daily  propofol Infusion 50 MICROgram(s)/kG/Min IV Continuous <Continuous>  senna Syrup 5 milliLiter(s) Oral three times a day  valproic  acid Syrup 500 milliGRAM(s) Oral two times a day  voriconazole IVPB 300 milliGRAM(s) IV Intermittent every 12 hours      PRN Meds:  acetaminophen  IVPB. 1000 milliGRAM(s) IV Intermittent once PRN  ALBUTerol    90 MICROgram(s) HFA Inhaler 2 Puff(s) Inhalation every 6 hours PRN      LABS:                        7.8    17.22 )-----------( 115      ( 2017 03:10 )             23.7     Hgb Trend: 7.8<--, 8.2<--, 7.7<--, 8.1<--, 7.8<--      142  |  96<L>  |  9   ----------------------------<  151<H>  4.1   |  38<H>  |  0.72    Ca    8.4      2017 03:10  Phos  3.9       Mg     2.0         TPro  5.0<L>  /  Alb  2.4<L>  /  TBili  0.5  /  DBili  x   /  AST  12  /  ALT  7   /  AlkPhos  64      Creatinine Trend: 0.72<--, 0.65<--, 0.67<--, 0.56<--, 0.64<--, 0.70<--  PT/INR - ( 2017 03:10 )   PT: 16.0 SEC;   INR: 1.42          PTT - ( 2017 03:10 )  PTT:27.8 SEC  Urinalysis Basic - ( 2017 11:49 )    Color: YELLOW / Appearance: HAZY / S.027 / pH: 6.5  Gluc: 150 / Ketone: TRACE  / Bili: NEGATIVE / Urobili: 1 E.U.   Blood: LARGE / Protein: 100 / Nitrite: POSITIVE   Leuk Esterase: LARGE / RBC: >50 / WBC >50   Sq Epi: x / Non Sq Epi: x / Bacteria: MANY            MICROBIOLOGY:     Culture - Respiratory with Gram Stain (collected 2017 17:05)  Source: TRACHEAL ASPIRATE  Final Report (2017 13:52):    Normal Respiratory Katherine  Present    FEW    FEW      RADIOLOGY:  [ ] Reviewed and interpreted by me    EKG: CHIEF COMPLAINT: Hemoptysis    Interval Events: Hypoglycemia improved after starting tube feeds, taken off D5. Repeat cultures drawn due to fevers, likely urinary source. No fevers overnight but did require levophed x several hours for low BPs.     REVIEW OF SYSTEMS:  Constitutional: [ ] negative [ x] fevers [ ] chills [ ] weight loss [ ] weight gain  HEENT: [ ] negative [ ] dry eyes [ ] eye irritation [ ] postnasal drip [ ] nasal congestion  CV: [ x] negative  [ ] chest pain [ ] orthopnea [ ] palpitations [ ] murmur  Resp: [ ] negative [ ] cough [x ] shortness of breath [ ] dyspnea [ ] wheezing [ ] sputum [ ] hemoptysis  GI: [x ] negative [ ] nausea [ ] vomiting [ ] diarrhea [ ] constipation [ ] abd pain [ ] dysphagia   : [x ] negative [ ] dysuria [ ] nocturia [ ] hematuria [ ] increased urinary frequency  Musculoskeletal: [ ] negative [ ] back pain [ ] myalgias [ ] arthralgias [ ] fracture  Skin: [x ] negative [ ] rash [ ] itch  Neurological: [x ] negative [ ] headache [ ] dizziness [ ] syncope [ ] weakness [ ] numbness  Psychiatric: [ ] negative [ ] anxiety [ ] depression  Endocrine: [ x] negative [ ] diabetes [ ] thyroid problem  Hematologic/Lymphatic: [ ] negative [ ] anemia [ ] bleeding problem  Allergic/Immunologic: [ ] negative [ ] itchy eyes [ ] nasal discharge [ ] hives [ ] angioedema  [ ] All other systems negative  [ ] Unable to assess ROS because ________    OBJECTIVE:  ICU Vital Signs Last 24 Hrs  T(C): 36.3 (2017 04:00), Max: 39.6 (2017 12:00)  T(F): 97.4 (2017 04:00), Max: 103.3 (2017 12:00)  HR: 108 (2017 06:00) (88 - 123)  BP: 118/69 (2017 20:00) (111/64 - 118/69)  BP(mean): 82 (2017 20:00) (72 - 82)  ABP: 160/85 (2017 06:00) (66/40 - 160/85)  ABP(mean): 107 (2017 06:00) (50 - 108)  RR: 18 (2017 06:00) (0 - 24)  SpO2: 98% (2017 06:00) (93% - 100%)    Mode: AC/ CMV (Assist Control/ Continuous Mandatory Ventilation), RR (machine): 12, TV (machine): 450, FiO2: 40, PEEP: 8, MAP: 13, PIP: 35     @ 07:01  -   @ 07:00  --------------------------------------------------------  IN: 1883.8 mL / OUT: 1235 mL / NET: 648.8 mL     @ 07:  -   @ 06:53  --------------------------------------------------------  IN: 2257.9 mL / OUT: 2650 mL / NET: -392.1 mL      CAPILLARY BLOOD GLUCOSE      POCT Blood Glucose.: 129 mg/dL (2017 05:07)      PHYSICAL EXAM:  General: NAD, following commands  HEENT: trach in place, no bleeding from nose  Lymph Nodes: normal  Neck: trach in place, no active bleeding  Respiratory: b/l rhonchi improved from prior  Cardiovascular: tachycardic, no MRG  Abdomen: soft, NTND  Extremities: warm, well perfused, no rashes, mild edema  Skin: no rashes or breakdown  Neurological: on sedation, semi-alert/arouseable and responding to commands  Psychiatry: normal    LINES: PIV, trach, KO tube    HOSPITAL MEDICATIONS:  Standing Meds:  buDESOnide 160 MICROgram(s)/formoterol 4.5 MICROgram(s) Inhaler 2 Puff(s) Inhalation two times a day  chlorhexidine 4% Liquid 1 Application(s) Topical daily  fentaNYL   Infusion 3 MICROgram(s)/kG/Hr IV Continuous <Continuous>  fentaNYL   Patch 100 MICROgram(s)/Hr. 1 Patch Transdermal every 48 hours  ferrous    sulfate 325 milliGRAM(s) Oral three times a day with meals  folic acid 1 milliGRAM(s) Oral daily  heparin  Injectable 5000 Unit(s) SubCutaneous every 8 hours  insulin lispro (HumaLOG) corrective regimen sliding scale   SubCutaneous every 6 hours  ipratropium 17 MICROgram(s) HFA Inhaler 1 Puff(s) Inhalation every 6 hours  norepinephrine Infusion 0.05 MICROgram(s)/kG/Min IV Continuous <Continuous>  petrolatum Ophthalmic Ointment 1 Application(s) Both EYES two times a day  piperacillin/tazobactam IVPB. 3.375 Gram(s) IV Intermittent every 8 hours  polyethylene glycol 3350 17 Gram(s) Oral at bedtime  predniSONE   Tablet 30 milliGRAM(s) Oral daily  propofol Infusion 50 MICROgram(s)/kG/Min IV Continuous <Continuous>  senna Syrup 5 milliLiter(s) Oral three times a day  valproic  acid Syrup 500 milliGRAM(s) Oral two times a day  voriconazole IVPB 300 milliGRAM(s) IV Intermittent every 12 hours      PRN Meds:  acetaminophen  IVPB. 1000 milliGRAM(s) IV Intermittent once PRN  ALBUTerol    90 MICROgram(s) HFA Inhaler 2 Puff(s) Inhalation every 6 hours PRN      LABS:                        7.8    17.22 )-----------( 115      ( 2017 03:10 )             23.7     Hgb Trend: 7.8<--, 8.2<--, 7.7<--, 8.1<--, 7.8<--      142  |  96<L>  |  9   ----------------------------<  151<H>  4.1   |  38<H>  |  0.72    Ca    8.4      2017 03:10  Phos  3.9       Mg     2.0         TPro  5.0<L>  /  Alb  2.4<L>  /  TBili  0.5  /  DBili  x   /  AST  12  /  ALT  7   /  AlkPhos  64      Creatinine Trend: 0.72<--, 0.65<--, 0.67<--, 0.56<--, 0.64<--, 0.70<--  PT/INR - ( 2017 03:10 )   PT: 16.0 SEC;   INR: 1.42          PTT - ( 2017 03:10 )  PTT:27.8 SEC  Urinalysis Basic - ( 2017 11:49 )    Color: YELLOW / Appearance: HAZY / S.027 / pH: 6.5  Gluc: 150 / Ketone: TRACE  / Bili: NEGATIVE / Urobili: 1 E.U.   Blood: LARGE / Protein: 100 / Nitrite: POSITIVE   Leuk Esterase: LARGE / RBC: >50 / WBC >50   Sq Epi: x / Non Sq Epi: x / Bacteria: MANY            MICROBIOLOGY:     Culture - Respiratory with Gram Stain (collected 2017 17:05)  Source: TRACHEAL ASPIRATE  Final Report (2017 13:52):    Normal Respiratory Katherine  Present    FEW    FEW      RADIOLOGY:  [ ] Reviewed and interpreted by me    EKG: CHIEF COMPLAINT: Hemoptysis    Interval Events: Hypoglycemia improved after starting tube feeds, taken off D5. Repeat cultures drawn due to fevers, likely urinary source. No fevers overnight but did require levophed x several hours for low BPs.     REVIEW OF SYSTEMS:  Constitutional: [ ] negative [ x] fevers [ ] chills [ ] weight loss [ ] weight gain  HEENT: [ ] negative [ ] dry eyes [ ] eye irritation [ ] postnasal drip [ ] nasal congestion  CV: [ x] negative  [ ] chest pain [ ] orthopnea [ ] palpitations [ ] murmur  Resp: [ ] negative [ ] cough [x ] shortness of breath [ ] dyspnea [ ] wheezing [ ] sputum [ ] hemoptysis  GI: [x ] negative [ ] nausea [ ] vomiting [ ] diarrhea [ ] constipation [ ] abd pain [ ] dysphagia   : [x ] negative [ ] dysuria [ ] nocturia [ ] hematuria [ ] increased urinary frequency  Musculoskeletal: [ ] negative [ ] back pain [ ] myalgias [ ] arthralgias [ ] fracture  Skin: [x ] negative [ ] rash [ ] itch  Neurological: [x ] negative [ ] headache [ ] dizziness [ ] syncope [ ] weakness [ ] numbness  Psychiatric: [ ] negative [ ] anxiety [ ] depression  Endocrine: [ x] negative [ ] diabetes [ ] thyroid problem  Hematologic/Lymphatic: [ ] negative [ ] anemia [ ] bleeding problem  Allergic/Immunologic: [ ] negative [ ] itchy eyes [ ] nasal discharge [ ] hives [ ] angioedema  [x] All other systems negative  [ ] Unable to assess ROS because ________    OBJECTIVE:  ICU Vital Signs Last 24 Hrs  T(C): 36.3 (2017 04:00), Max: 39.6 (2017 12:00)  T(F): 97.4 (2017 04:00), Max: 103.3 (2017 12:00)  HR: 108 (2017 06:00) (88 - 123)  BP: 118/69 (2017 20:00) (111/64 - 118/69)  BP(mean): 82 (2017 20:00) (72 - 82)  ABP: 160/85 (2017 06:00) (66/40 - 160/85)  ABP(mean): 107 (2017 06:00) (50 - 108)  RR: 18 (2017 06:00) (0 - 24)  SpO2: 98% (2017 06:00) (93% - 100%)    Mode: AC/ CMV (Assist Control/ Continuous Mandatory Ventilation), RR (machine): 12, TV (machine): 450, FiO2: 40, PEEP: 8, MAP: 13, PIP: 35     @ 07:01  -   @ 07:00  --------------------------------------------------------  IN: 1883.8 mL / OUT: 1235 mL / NET: 648.8 mL     @ 07:  -   @ 06:53  --------------------------------------------------------  IN: 2257.9 mL / OUT: 2650 mL / NET: -392.1 mL      CAPILLARY BLOOD GLUCOSE      POCT Blood Glucose.: 129 mg/dL (2017 05:07)      PHYSICAL EXAM:  General: NAD, following commands  HEENT: trach in place, no bleeding from nose  Lymph Nodes: normal  Neck: trach in place, no active bleeding  Respiratory: b/l rhonchi improved from prior  Cardiovascular: tachycardic, no MRG  Abdomen: soft, NTND  Extremities: warm, well perfused, no rashes, mild edema  Skin: no rashes or breakdown  Neurological: on sedation, semi-alert/arouseable and responding to commands  Psychiatry: normal    LINES: PIV, trach, KO tube    HOSPITAL MEDICATIONS:  Standing Meds:  buDESOnide 160 MICROgram(s)/formoterol 4.5 MICROgram(s) Inhaler 2 Puff(s) Inhalation two times a day  chlorhexidine 4% Liquid 1 Application(s) Topical daily  fentaNYL   Infusion 3 MICROgram(s)/kG/Hr IV Continuous <Continuous>  fentaNYL   Patch 100 MICROgram(s)/Hr. 1 Patch Transdermal every 48 hours  ferrous    sulfate 325 milliGRAM(s) Oral three times a day with meals  folic acid 1 milliGRAM(s) Oral daily  heparin  Injectable 5000 Unit(s) SubCutaneous every 8 hours  insulin lispro (HumaLOG) corrective regimen sliding scale   SubCutaneous every 6 hours  ipratropium 17 MICROgram(s) HFA Inhaler 1 Puff(s) Inhalation every 6 hours  norepinephrine Infusion 0.05 MICROgram(s)/kG/Min IV Continuous <Continuous>  petrolatum Ophthalmic Ointment 1 Application(s) Both EYES two times a day  piperacillin/tazobactam IVPB. 3.375 Gram(s) IV Intermittent every 8 hours  polyethylene glycol 3350 17 Gram(s) Oral at bedtime  predniSONE   Tablet 30 milliGRAM(s) Oral daily  propofol Infusion 50 MICROgram(s)/kG/Min IV Continuous <Continuous>  senna Syrup 5 milliLiter(s) Oral three times a day  valproic  acid Syrup 500 milliGRAM(s) Oral two times a day  voriconazole IVPB 300 milliGRAM(s) IV Intermittent every 12 hours      PRN Meds:  acetaminophen  IVPB. 1000 milliGRAM(s) IV Intermittent once PRN  ALBUTerol    90 MICROgram(s) HFA Inhaler 2 Puff(s) Inhalation every 6 hours PRN      LABS:                        7.8    17.22 )-----------( 115      ( 2017 03:10 )             23.7     Hgb Trend: 7.8<--, 8.2<--, 7.7<--, 8.1<--, 7.8<--      142  |  96<L>  |  9   ----------------------------<  151<H>  4.1   |  38<H>  |  0.72    Ca    8.4      2017 03:10  Phos  3.9       Mg     2.0         TPro  5.0<L>  /  Alb  2.4<L>  /  TBili  0.5  /  DBili  x   /  AST  12  /  ALT  7   /  AlkPhos  64      Creatinine Trend: 0.72<--, 0.65<--, 0.67<--, 0.56<--, 0.64<--, 0.70<--  PT/INR - ( 2017 03:10 )   PT: 16.0 SEC;   INR: 1.42          PTT - ( 2017 03:10 )  PTT:27.8 SEC  Urinalysis Basic - ( 2017 11:49 )    Color: YELLOW / Appearance: HAZY / S.027 / pH: 6.5  Gluc: 150 / Ketone: TRACE  / Bili: NEGATIVE / Urobili: 1 E.U.   Blood: LARGE / Protein: 100 / Nitrite: POSITIVE   Leuk Esterase: LARGE / RBC: >50 / WBC >50   Sq Epi: x / Non Sq Epi: x / Bacteria: MANY            MICROBIOLOGY:     Culture - Respiratory with Gram Stain (collected 2017 17:05)  Source: TRACHEAL ASPIRATE  Final Report (2017 13:52):    Normal Respiratory Katherine  Present    FEW    FEW      RADIOLOGY:  [ ] Reviewed and interpreted by me    EKG:

## 2017-11-26 NOTE — PROGRESS NOTE ADULT - ASSESSMENT
47M with hx of sarcoidosis, started on Eqliuis for cerebrovascular infarct with PFO on echo and paroxysmal afib in September c/b recurrent hemoptysis (Eliquis d/c'd on 9/20, then restarted between 10/23-27 after hemoptysis improved), d/c'd to rehab then returned 11/9 for recurrent hemoptysis requiring intubation and CTICU admission for management of hemoptysis including IR embolization (11/16) and serial bronch (11/17 and 11/20) in CTICU with no active bleeding visualized, now remains intubated with difficulty weaning vent as during CPAP trials pt fails to initiate breaths, unclear etiology. Transferred to MICU from CTICU for continued vent management and attempted weaning. Bedside trach placed 2 days ago c/b episode of hypoxia and bleeding around trach site after suctioning, resolved after surgicel and pressure. Has been febrile over the past 2 days (no fevers overnight) so repeat cultures drawn, likely UTI, started on antibiotics.     # Neuro:  -on propofol gtt at 30mcg/kg/min  -Fentanyl gtt for pain control  -Able to follow commands    #Cardiovascular:  -Requiring intermittent pressors, was on levophed overnight but off this morning  -A-line in place for BP monitoring    #Respiratory:  -Trach placed 2 days ago at bedside  -Continues to fail CPAP trials as does not initiate breath, poor tidal volumes, becomes anxious and tachypneic. Possibly related to recent paralytics vs critical illness myopathy vs diminished respiratory drive  -Failed trial of extubation 4 days ago due to tachypnea and poor tidal volume, reintubated and found to be auto-peeping, I:E ratio adjusted to allow for full exhalation with improvement, ABGs improved.   -Continue bronchodilators and pulmonary toilet    #GI:  -Restarted feeds today via new KO tube     #Renal:  -Monitor I's and O's  -No active issues    #Heme:  -H/H stable; baseline Hb 9-10  -Improving thrombocytopenia    #ID:  -Aspergilloma dx 9/24/17, on voriconazole x 6 weeks per ID recommendations. Recommend continuing voriconazole given hemoptysis recurred after was stopped, possible related to inflammation 2/2 aspergilloma and possible continued infection  -Recultured and started on antibiotics yesterday for fevers up to 103F. Likely urinary source, f/u blood/urine cx    #Endocrine:   -Hypoglycemia yesterday, required D5 during the day, KO tube placed and feeds restarted with normal blood glucose levels overnight so D5 stopped.   -repeat electrolytes PRN    #DVT PPX: HSQ    RONAL Cantu, PGY3  MICU  [id number] 47M with hx of sarcoidosis, started on Eqliuis for cerebrovascular infarct with PFO on echo and paroxysmal afib in September c/b recurrent hemoptysis (Eliquis d/c'd on 9/20, then restarted between 10/23-27 after hemoptysis improved), d/c'd to rehab then returned 11/9 for recurrent hemoptysis requiring intubation and CTICU admission for management of hemoptysis including IR embolization (11/16) and serial bronch (11/17 and 11/20) in CTICU with no active bleeding visualized, now remains intubated with difficulty weaning vent as during CPAP trials pt fails to initiate breaths, unclear etiology. Transferred to MICU from CTICU for continued vent management and attempted weaning. Bedside trach placed 2 days ago c/b episode of hypoxia and bleeding around trach site after suctioning, resolved after surgicel and pressure. Has been febrile over the past 2 days (no fevers overnight) so repeat cultures drawn, likely UTI, started on antibiotics.     # Neuro:  -on propofol gtt at 30mcg/kg/min; attempts to wean unsuccessful as becomes anxious.   -Fentanyl gtt for pain control; d/c gtt and try to manage with fentanyl patch, ativan prn anxiety  -Able to follow commands    #Cardiovascular:  -Requiring intermittent pressors, was on levophed overnight but off this morning  -A-line in place for BP monitoring, remove today in setting of active infection    #Respiratory:  -Trach placed 2 days ago at bedside  -Continues to fail CPAP trials as does not initiate breath, poor tidal volumes, becomes anxious and tachypneic. Possibly related to recent paralytics vs critical illness myopathy vs diminished respiratory drive  -Failed trial of extubation 4 days ago due to tachypnea and poor tidal volume, reintubated and found to be auto-peeping, I:E ratio adjusted to allow for full exhalation with improvement, ABGs improved.   -Continue bronchodilators and pulmonary toilet    #GI:  -Restarted feeds today via new KO tube     #Renal:  -Monitor I's and O's  -No active issues    #Heme:  -H/H stable; baseline Hb 9-10  -Improving thrombocytopenia    #ID:  -Aspergilloma dx 9/24/17, on voriconazole x 6 weeks per ID recommendations. Recommend continuing voriconazole given hemoptysis recurred after was stopped, possible related to inflammation 2/2 aspergilloma and possible continued infection  -Recultured and started on antibiotics yesterday for fevers up to 103F. Likely urinary source, f/u blood/urine cx    #Endocrine:   -Hypoglycemia yesterday, required D5 during the day, KO tube placed and feeds restarted with normal blood glucose levels overnight so D5 stopped.   -repeat electrolytes PRN    #DVT PPX: HSQ    RONAL Cantu, PGY3  MICU  [id number]

## 2017-11-27 LAB
-  AMIKACIN: SIGNIFICANT CHANGE UP
-  AMPICILLIN/SULBACTAM: SIGNIFICANT CHANGE UP
-  AMPICILLIN: SIGNIFICANT CHANGE UP
-  AZTREONAM: SIGNIFICANT CHANGE UP
-  CEFAZOLIN: SIGNIFICANT CHANGE UP
-  CEFEPIME: SIGNIFICANT CHANGE UP
-  CEFOXITIN: SIGNIFICANT CHANGE UP
-  CEFTAZIDIME: SIGNIFICANT CHANGE UP
-  CEFTRIAXONE: SIGNIFICANT CHANGE UP
-  CIPROFLOXACIN: SIGNIFICANT CHANGE UP
-  ERTAPENEM: SIGNIFICANT CHANGE UP
-  GENTAMICIN: SIGNIFICANT CHANGE UP
-  IMIPENEM: SIGNIFICANT CHANGE UP
-  LEVOFLOXACIN: SIGNIFICANT CHANGE UP
-  MEROPENEM: SIGNIFICANT CHANGE UP
-  NITROFURANTOIN: SIGNIFICANT CHANGE UP
-  PIPERACILLIN/TAZOBACTAM: SIGNIFICANT CHANGE UP
-  TIGECYCLINE: SIGNIFICANT CHANGE UP
-  TOBRAMYCIN: SIGNIFICANT CHANGE UP
-  TRIMETHOPRIM/SULFAMETHOXAZOLE: SIGNIFICANT CHANGE UP
ALBUMIN SERPL ELPH-MCNC: 2.5 G/DL — LOW (ref 3.3–5)
ALP SERPL-CCNC: 67 U/L — SIGNIFICANT CHANGE UP (ref 40–120)
ALT FLD-CCNC: 9 U/L — SIGNIFICANT CHANGE UP (ref 4–41)
AST SERPL-CCNC: 15 U/L — SIGNIFICANT CHANGE UP (ref 4–40)
BACTERIA UR CULT: SIGNIFICANT CHANGE UP
BASE EXCESS BLDA CALC-SCNC: 14.6 MMOL/L — SIGNIFICANT CHANGE UP
BASOPHILS # BLD AUTO: 0.01 K/UL — SIGNIFICANT CHANGE UP (ref 0–0.2)
BASOPHILS NFR BLD AUTO: 0.1 % — SIGNIFICANT CHANGE UP (ref 0–2)
BILIRUB SERPL-MCNC: 0.3 MG/DL — SIGNIFICANT CHANGE UP (ref 0.2–1.2)
BUN SERPL-MCNC: 10 MG/DL — SIGNIFICANT CHANGE UP (ref 7–23)
CALCIUM SERPL-MCNC: 8.5 MG/DL — SIGNIFICANT CHANGE UP (ref 8.4–10.5)
CHLORIDE SERPL-SCNC: 95 MMOL/L — LOW (ref 98–107)
CO2 SERPL-SCNC: 39 MMOL/L — HIGH (ref 22–31)
CREAT SERPL-MCNC: 0.66 MG/DL — SIGNIFICANT CHANGE UP (ref 0.5–1.3)
EOSINOPHIL # BLD AUTO: 0.01 K/UL — SIGNIFICANT CHANGE UP (ref 0–0.5)
EOSINOPHIL NFR BLD AUTO: 0.1 % — SIGNIFICANT CHANGE UP (ref 0–6)
GLUCOSE BLDC GLUCOMTR-MCNC: 104 MG/DL — HIGH (ref 70–99)
GLUCOSE BLDC GLUCOMTR-MCNC: 115 MG/DL — HIGH (ref 70–99)
GLUCOSE BLDC GLUCOMTR-MCNC: 135 MG/DL — HIGH (ref 70–99)
GLUCOSE BLDC GLUCOMTR-MCNC: 153 MG/DL — HIGH (ref 70–99)
GLUCOSE SERPL-MCNC: 108 MG/DL — HIGH (ref 70–99)
HCO3 BLDA-SCNC: 37 MMOL/L — HIGH (ref 22–26)
HCT VFR BLD CALC: 25.1 % — LOW (ref 39–50)
HGB BLD-MCNC: 7.9 G/DL — LOW (ref 13–17)
IMM GRANULOCYTES # BLD AUTO: 0.11 # — SIGNIFICANT CHANGE UP
IMM GRANULOCYTES NFR BLD AUTO: 0.8 % — SIGNIFICANT CHANGE UP (ref 0–1.5)
LYMPHOCYTES # BLD AUTO: 0.99 K/UL — LOW (ref 1–3.3)
LYMPHOCYTES # BLD AUTO: 7.6 % — LOW (ref 13–44)
MAGNESIUM SERPL-MCNC: 2.4 MG/DL — SIGNIFICANT CHANGE UP (ref 1.6–2.6)
MCHC RBC-ENTMCNC: 30.5 PG — SIGNIFICANT CHANGE UP (ref 27–34)
MCHC RBC-ENTMCNC: 31.5 % — LOW (ref 32–36)
MCV RBC AUTO: 96.9 FL — SIGNIFICANT CHANGE UP (ref 80–100)
METHOD TYPE: SIGNIFICANT CHANGE UP
MONOCYTES # BLD AUTO: 1.35 K/UL — HIGH (ref 0–0.9)
MONOCYTES NFR BLD AUTO: 10.4 % — SIGNIFICANT CHANGE UP (ref 2–14)
NEUTROPHILS # BLD AUTO: 10.56 K/UL — HIGH (ref 1.8–7.4)
NEUTROPHILS NFR BLD AUTO: 81 % — HIGH (ref 43–77)
NRBC # FLD: 0 — SIGNIFICANT CHANGE UP
ORGANISM # SPEC MICROSCOPIC CNT: SIGNIFICANT CHANGE UP
PCO2 BLDA: 88 MMHG — CRITICAL HIGH (ref 35–48)
PH BLDA: 7.3 PH — LOW (ref 7.35–7.45)
PHOSPHATE SERPL-MCNC: 3.1 MG/DL — SIGNIFICANT CHANGE UP (ref 2.5–4.5)
PLATELET # BLD AUTO: 114 K/UL — LOW (ref 150–400)
PMV BLD: 13.2 FL — HIGH (ref 7–13)
PO2 BLDA: 194 MMHG — HIGH (ref 83–108)
POTASSIUM SERPL-MCNC: 4.8 MMOL/L — SIGNIFICANT CHANGE UP (ref 3.5–5.3)
POTASSIUM SERPL-SCNC: 4.8 MMOL/L — SIGNIFICANT CHANGE UP (ref 3.5–5.3)
PREALB SERPL-MCNC: 16 MG/DL — LOW (ref 20–40)
PROT SERPL-MCNC: 5.6 G/DL — LOW (ref 6–8.3)
RBC # BLD: 2.59 M/UL — LOW (ref 4.2–5.8)
RBC # FLD: 15.1 % — HIGH (ref 10.3–14.5)
SAO2 % BLDA: 99.6 % — HIGH (ref 95–99)
SODIUM SERPL-SCNC: 141 MMOL/L — SIGNIFICANT CHANGE UP (ref 135–145)
SPECIMEN SOURCE: SIGNIFICANT CHANGE UP
WBC # BLD: 13.03 K/UL — HIGH (ref 3.8–10.5)
WBC # FLD AUTO: 13.03 K/UL — HIGH (ref 3.8–10.5)

## 2017-11-27 PROCEDURE — 71250 CT THORAX DX C-: CPT | Mod: 26

## 2017-11-27 PROCEDURE — 71010: CPT | Mod: 26

## 2017-11-27 PROCEDURE — 99232 SBSQ HOSP IP/OBS MODERATE 35: CPT | Mod: GC

## 2017-11-27 PROCEDURE — 99291 CRITICAL CARE FIRST HOUR: CPT

## 2017-11-27 PROCEDURE — 99232 SBSQ HOSP IP/OBS MODERATE 35: CPT

## 2017-11-27 RX ORDER — SENNA PLUS 8.6 MG/1
10 TABLET ORAL AT BEDTIME
Qty: 0 | Refills: 0 | Status: DISCONTINUED | OUTPATIENT
Start: 2017-11-27 | End: 2017-11-27

## 2017-11-27 RX ORDER — FENTANYL CITRATE 50 UG/ML
100 INJECTION INTRAVENOUS ONCE
Qty: 0 | Refills: 0 | Status: DISCONTINUED | OUTPATIENT
Start: 2017-11-27 | End: 2017-11-27

## 2017-11-27 RX ORDER — SODIUM CHLORIDE 9 MG/ML
1000 INJECTION INTRAMUSCULAR; INTRAVENOUS; SUBCUTANEOUS ONCE
Qty: 0 | Refills: 0 | Status: COMPLETED | OUTPATIENT
Start: 2017-11-27 | End: 2017-11-27

## 2017-11-27 RX ORDER — ALBUTEROL 90 UG/1
2 AEROSOL, METERED ORAL EVERY 6 HOURS
Qty: 0 | Refills: 0 | Status: DISCONTINUED | OUTPATIENT
Start: 2017-11-27 | End: 2017-12-08

## 2017-11-27 RX ORDER — FENTANYL CITRATE 50 UG/ML
2 INJECTION INTRAVENOUS
Qty: 2500 | Refills: 0 | Status: DISCONTINUED | OUTPATIENT
Start: 2017-11-27 | End: 2017-11-27

## 2017-11-27 RX ORDER — SENNA PLUS 8.6 MG/1
10 TABLET ORAL AT BEDTIME
Qty: 0 | Refills: 0 | Status: DISCONTINUED | OUTPATIENT
Start: 2017-11-27 | End: 2017-12-12

## 2017-11-27 RX ORDER — FENTANYL CITRATE 50 UG/ML
2 INJECTION INTRAVENOUS
Qty: 2500 | Refills: 0 | Status: DISCONTINUED | OUTPATIENT
Start: 2017-11-27 | End: 2017-11-29

## 2017-11-27 RX ORDER — PROPOFOL 10 MG/ML
50 INJECTION, EMULSION INTRAVENOUS
Qty: 1000 | Refills: 0 | Status: DISCONTINUED | OUTPATIENT
Start: 2017-11-27 | End: 2017-12-02

## 2017-11-27 RX ORDER — FENTANYL CITRATE 50 UG/ML
1 INJECTION INTRAVENOUS
Qty: 0 | Refills: 0 | Status: DISCONTINUED | OUTPATIENT
Start: 2017-11-27 | End: 2017-12-03

## 2017-11-27 RX ADMIN — Medication 1 PUFF(S): at 04:13

## 2017-11-27 RX ADMIN — Medication 325 MILLIGRAM(S): at 12:19

## 2017-11-27 RX ADMIN — BUDESONIDE AND FORMOTEROL FUMARATE DIHYDRATE 2 PUFF(S): 160; 4.5 AEROSOL RESPIRATORY (INHALATION) at 21:03

## 2017-11-27 RX ADMIN — Medication 250 MILLIGRAM(S): at 05:42

## 2017-11-27 RX ADMIN — HEPARIN SODIUM 5000 UNIT(S): 5000 INJECTION INTRAVENOUS; SUBCUTANEOUS at 21:38

## 2017-11-27 RX ADMIN — CHLORHEXIDINE GLUCONATE 1 APPLICATION(S): 213 SOLUTION TOPICAL at 12:18

## 2017-11-27 RX ADMIN — PROPOFOL 22.5 MICROGRAM(S)/KG/MIN: 10 INJECTION, EMULSION INTRAVENOUS at 08:39

## 2017-11-27 RX ADMIN — PIPERACILLIN AND TAZOBACTAM 25 GRAM(S): 4; .5 INJECTION, POWDER, LYOPHILIZED, FOR SOLUTION INTRAVENOUS at 05:42

## 2017-11-27 RX ADMIN — Medication 650 MILLIGRAM(S): at 14:29

## 2017-11-27 RX ADMIN — FENTANYL CITRATE 15 MICROGRAM(S)/KG/HR: 50 INJECTION INTRAVENOUS at 21:39

## 2017-11-27 RX ADMIN — FENTANYL CITRATE 1 PATCH: 50 INJECTION INTRAVENOUS at 04:20

## 2017-11-27 RX ADMIN — Medication 500 MILLIGRAM(S): at 05:42

## 2017-11-27 RX ADMIN — FENTANYL CITRATE 15 MICROGRAM(S)/KG/HR: 50 INJECTION INTRAVENOUS at 05:10

## 2017-11-27 RX ADMIN — FENTANYL CITRATE 15 MICROGRAM(S)/KG/HR: 50 INJECTION INTRAVENOUS at 04:55

## 2017-11-27 RX ADMIN — Medication 250 MILLIGRAM(S): at 17:03

## 2017-11-27 RX ADMIN — Medication 325 MILLIGRAM(S): at 08:31

## 2017-11-27 RX ADMIN — Medication 1 APPLICATION(S): at 05:43

## 2017-11-27 RX ADMIN — HEPARIN SODIUM 5000 UNIT(S): 5000 INJECTION INTRAVENOUS; SUBCUTANEOUS at 05:42

## 2017-11-27 RX ADMIN — BUDESONIDE AND FORMOTEROL FUMARATE DIHYDRATE 2 PUFF(S): 160; 4.5 AEROSOL RESPIRATORY (INHALATION) at 11:25

## 2017-11-27 RX ADMIN — Medication 1 PUFF(S): at 11:25

## 2017-11-27 RX ADMIN — Medication 1 MILLIGRAM(S): at 12:19

## 2017-11-27 RX ADMIN — VORICONAZOLE 125 MILLIGRAM(S): 10 INJECTION, POWDER, LYOPHILIZED, FOR SOLUTION INTRAVENOUS at 12:20

## 2017-11-27 RX ADMIN — SENNA PLUS 5 MILLILITER(S): 8.6 TABLET ORAL at 05:42

## 2017-11-27 RX ADMIN — Medication 1: at 17:03

## 2017-11-27 RX ADMIN — Medication 650 MILLIGRAM(S): at 08:31

## 2017-11-27 RX ADMIN — PROPOFOL 22.5 MICROGRAM(S)/KG/MIN: 10 INJECTION, EMULSION INTRAVENOUS at 17:04

## 2017-11-27 RX ADMIN — PROPOFOL 22.5 MICROGRAM(S)/KG/MIN: 10 INJECTION, EMULSION INTRAVENOUS at 05:43

## 2017-11-27 RX ADMIN — PROPOFOL 22.5 MICROGRAM(S)/KG/MIN: 10 INJECTION, EMULSION INTRAVENOUS at 21:39

## 2017-11-27 RX ADMIN — Medication 1 PUFF(S): at 15:54

## 2017-11-27 RX ADMIN — Medication 1 PUFF(S): at 21:03

## 2017-11-27 RX ADMIN — Medication 2 MILLIGRAM(S): at 04:39

## 2017-11-27 RX ADMIN — PIPERACILLIN AND TAZOBACTAM 25 GRAM(S): 4; .5 INJECTION, POWDER, LYOPHILIZED, FOR SOLUTION INTRAVENOUS at 21:38

## 2017-11-27 RX ADMIN — VORICONAZOLE 125 MILLIGRAM(S): 10 INJECTION, POWDER, LYOPHILIZED, FOR SOLUTION INTRAVENOUS at 23:09

## 2017-11-27 RX ADMIN — ALBUTEROL 2 PUFF(S): 90 AEROSOL, METERED ORAL at 21:03

## 2017-11-27 RX ADMIN — Medication 30 MILLIGRAM(S): at 12:19

## 2017-11-27 RX ADMIN — FENTANYL CITRATE 100 MICROGRAM(S): 50 INJECTION INTRAVENOUS at 04:55

## 2017-11-27 RX ADMIN — SENNA PLUS 10 MILLILITER(S): 8.6 TABLET ORAL at 21:39

## 2017-11-27 RX ADMIN — FENTANYL CITRATE 15 MICROGRAM(S)/KG/HR: 50 INJECTION INTRAVENOUS at 08:39

## 2017-11-27 RX ADMIN — PIPERACILLIN AND TAZOBACTAM 25 GRAM(S): 4; .5 INJECTION, POWDER, LYOPHILIZED, FOR SOLUTION INTRAVENOUS at 14:52

## 2017-11-27 RX ADMIN — Medication 325 MILLIGRAM(S): at 17:04

## 2017-11-27 RX ADMIN — SODIUM CHLORIDE 1000 MILLILITER(S): 9 INJECTION INTRAMUSCULAR; INTRAVENOUS; SUBCUTANEOUS at 13:44

## 2017-11-27 RX ADMIN — HEPARIN SODIUM 5000 UNIT(S): 5000 INJECTION INTRAVENOUS; SUBCUTANEOUS at 14:28

## 2017-11-27 RX ADMIN — Medication 1 APPLICATION(S): at 17:04

## 2017-11-27 RX ADMIN — Medication 500 MILLIGRAM(S): at 17:04

## 2017-11-27 RX ADMIN — POLYETHYLENE GLYCOL 3350 17 GRAM(S): 17 POWDER, FOR SOLUTION ORAL at 21:39

## 2017-11-27 NOTE — PROGRESS NOTE ADULT - PROBLEM SELECTOR PLAN 5
on prednisone.  11/10 continue steroid  11/11 on steroid  11/12 on oral steroid  11/13: cont oral steroids!!  11/14: Has pretty poor lungs secondary to steroids: Cont current therapy!!  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  11/27: cont on steroids

## 2017-11-27 NOTE — PROGRESS NOTE ADULT - PROBLEM SELECTOR PLAN 3
? 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 ot from bronchiectasis? eliquis have been dced: needs card to se: In addition, he may need IR help with embolization, it his hemoptysis increases: He has had pleurodesis on the left side.  11/10 quantify hemoptysis. no AC. stable  11/11 no more hemoptysis. off AC  11/12 resolved. off AC  11/13: today had hemoptysis again----->localize site---> IR embolization: left a message for thoracic team  11/14: DW Dr Caldera: for repeat bronchoscopy to localize the site of bleeding and need IR help to embolize the culprit vessel!  11/15: started on broad spectrum antibiotics and for bronchoscopy today  11/16: s/p IR emobolization done: monitor for more hemoptysis  11/17: s/p bronchsocopy: reportedly old blood seen in airways!  11/18 s/p bronchoscopy in ICU via ETT. old clot. no active bleeding  11/19 stable. no active bleeding per recent bronchoscopy  11/20; off AC at this time  11/22: resolved: remained off ac  11/23: remained off AC: has hx of PAF  11/24: remains off AC  11/27: no bleeding noted

## 2017-11-27 NOTE — PROGRESS NOTE ADULT - PROBLEM SELECTOR PLAN 1
now spiked to fever:  on zosyn, vancomycin, as well as voriconazole~  id FOLLOW UP  11/27: Cont antibiotics::

## 2017-11-27 NOTE — PROGRESS NOTE ADULT - PROBLEM SELECTOR PLAN 2
intubated: try to titrate as tolerated!  11/18 on ventilator support. Pt is sedated and paralyzed. defer ventilator Management/ weaning per CTICU team.  11/19 still sedated and paralyzed. Plan to wean off Nimbex today per Primary RN. ABGs reviewed.  11/20:ff paralyzing agent! But sedated !!  11/21: awake and alert: responding to simple commands: weaning trials  11/22: currently sedated: ? trach now?  11/23: for trach in AM  11/24: trach today  11/25: S/P trach yesterday: chest x-ray noted: Cont full vent support and wean if tolerates  11/26: [id number] with trach and full mechanical ventilation: Discussed with MICU attending in detail: for rpt ct scan chest

## 2017-11-27 NOTE — PROGRESS NOTE ADULT - ASSESSMENT
47 year old with sarcoidosis and COPD presents after recent andree surgery with recurrent hemoptysis.     This is liklely multifactorial- Cystic lung changes, sarcoid, ? superimposed infection many all be contributing.     Continue voriconazole.    Blood culture with coag negative staph- repeat blood cultures- ? contaminant.     Urine culture with GNY- c/w zosyn pending ID and sensitivity.

## 2017-11-27 NOTE — PROGRESS NOTE ADULT - SUBJECTIVE AND OBJECTIVE BOX
Patient is a 47y old  Male who presents with a chief complaint of hemoptysis (09 Nov 2017 00:32)   pt remaining critically ill  remains intubated       Any change in ROS:     MEDICATIONS  (STANDING):  buDESOnide 160 MICROgram(s)/formoterol 4.5 MICROgram(s) Inhaler 2 Puff(s) Inhalation two times a day  chlorhexidine 4% Liquid 1 Application(s) Topical daily  fentaNYL   Infusion 2 MICROgram(s)/kG/Hr (15 mL/Hr) IV Continuous <Continuous>  fentaNYL   Patch 100 MICROgram(s)/Hr. 1 Patch Transdermal every 48 hours  ferrous    sulfate 325 milliGRAM(s) Oral three times a day with meals  folic acid 1 milliGRAM(s) Oral daily  heparin  Injectable 5000 Unit(s) SubCutaneous every 8 hours  insulin lispro (HumaLOG) corrective regimen sliding scale   SubCutaneous every 6 hours  ipratropium 17 MICROgram(s) HFA Inhaler 1 Puff(s) Inhalation every 6 hours  petrolatum Ophthalmic Ointment 1 Application(s) Both EYES two times a day  piperacillin/tazobactam IVPB. 3.375 Gram(s) IV Intermittent every 8 hours  polyethylene glycol 3350 17 Gram(s) Oral at bedtime  predniSONE   Tablet 30 milliGRAM(s) Oral daily  propofol Infusion 50 MICROgram(s)/kG/Min (22.5 mL/Hr) IV Continuous <Continuous>  senna Syrup 10 milliLiter(s) Oral at bedtime  sodium chloride 0.9% Bolus 1000 milliLiter(s) IV Bolus once  valproic  acid Syrup 500 milliGRAM(s) Oral two times a day  vancomycin  IVPB 1000 milliGRAM(s) IV Intermittent every 12 hours  voriconazole IVPB 300 milliGRAM(s) IV Intermittent every 12 hours    MEDICATIONS  (PRN):  acetaminophen   Tablet 650 milliGRAM(s) Oral every 6 hours PRN For Temp greater than 38 C (100.4 F)  ALBUTerol    90 MICROgram(s) HFA Inhaler 2 Puff(s) Inhalation every 6 hours PRN Shortness of Breath and/or Wheezing  LORazepam   Injectable 1 milliGRAM(s) IV Push every 4 hours PRN Anxiety    Vital Signs Last 24 Hrs  T(C): 38 (27 Nov 2017 12:00), Max: 38 (26 Nov 2017 20:00)  T(F): 100.4 (27 Nov 2017 12:00), Max: 100.4 (26 Nov 2017 20:00)  HR: 112 (27 Nov 2017 12:00) (101 - 119)  BP: 74/50 (27 Nov 2017 12:00) (74/50 - 146/94)  BP(mean): 56 (27 Nov 2017 12:00) (56 - 107)  RR: 12 (27 Nov 2017 12:00) (12 - 38)  SpO2: 100% (27 Nov 2017 12:00) (84% - 100%)  Mode: AC/ CMV (Assist Control/ Continuous Mandatory Ventilation)  RR (machine): 12  TV (machine): 500  FiO2: 60  PEEP: 10  ITime: 1  MAP: 17  PIP: 44    I&O's Summary    26 Nov 2017 07:01  -  27 Nov 2017 07:00  --------------------------------------------------------  IN: 2779.1 mL / OUT: 925 mL / NET: 1854.1 mL    27 Nov 2017 07:01  -  27 Nov 2017 12:59  --------------------------------------------------------  IN: 37.5 mL / OUT: 35 mL / NET: 2.5 mL          Physical Exam:   GENERAL: NAD, well-groomed, well-developed  HEENT: GIOVANI/   Atraumatic, Normocephalic  ENMT: No tonsillar erythema, exudates, or enlargement; Moist mucous membranes, Good dentition, No lesions  NECK: Supple, No JVD, Normal thyroid  CHEST/LUNG: wheeze+   CVS: Regular rate and rhythm; No murmurs, rubs, or gallops  GI: : Soft, Nontender, Nondistended; Bowel sounds present  NERVOUS SYSTEM:  sedated and intubated  EXTREMITIES:  2+ Peripheral Pulses, No clubbing, cyanosis, or edema  LYMPH: No lymphadenopathy noted  SKIN: No rashes or lesions  ENDOCRINOLOGY: No Thyromegaly  PSYCH: Intubated    Labs:  ABG - ( 27 Nov 2017 06:37 )  pH: 7.30  /  pCO2: 88    /  pO2: 194   / HCO3: 37    / Base Excess: 14.6  /  SaO2: 99.6                          7.9    13.03 )-----------( 114      ( 27 Nov 2017 03:46 )             25.1                         7.8    17.22 )-----------( 115      ( 26 Nov 2017 03:10 )             23.7                         8.2    11.11 )-----------( 94       ( 25 Nov 2017 15:30 )             26.1                         7.7    7.02  )-----------( 84       ( 25 Nov 2017 02:30 )             24.7                         8.1    6.91  )-----------( 83       ( 24 Nov 2017 04:23 )             26.7     11-27    141  |  95<L>  |  10  ----------------------------<  108<H>  4.8   |  39<H>  |  0.66  11-26    142  |  96<L>  |  9   ----------------------------<  151<H>  4.1   |  38<H>  |  0.72  11-25    136  |  92<L>  |  14  ----------------------------<  92  4.0   |  37<H>  |  0.65  11-24    140  |  94<L>  |  15  ----------------------------<  89  4.5   |  40<H>  |  0.67    Ca    8.5      27 Nov 2017 03:46  Ca    8.4      26 Nov 2017 03:10  Phos  3.1     11-27  Phos  3.9     11-26  Mg     2.4     11-27  Mg     2.0     11-26    TPro  5.6<L>  /  Alb  2.5<L>  /  TBili  0.3  /  DBili  x   /  AST  15  /  ALT  9   /  AlkPhos  67  11-27  TPro  5.0<L>  /  Alb  2.4<L>  /  TBili  0.5  /  DBili  x   /  AST  12  /  ALT  7   /  AlkPhos  64  11-26  TPro  4.9<L>  /  Alb  2.5<L>  /  TBili  0.2  /  DBili  x   /  AST  12  /  ALT  6   /  AlkPhos  56  11-25  TPro  5.3<L>  /  Alb  2.8<L>  /  TBili  < 0.2<L>  /  DBili  x   /  AST  12  /  ALT  11  /  AlkPhos  58  11-24    CAPILLARY BLOOD GLUCOSE      POCT Blood Glucose.: 115 mg/dL (27 Nov 2017 12:17)  POCT Blood Glucose.: 104 mg/dL (27 Nov 2017 06:28)  POCT Blood Glucose.: 117 mg/dL (26 Nov 2017 23:50)  POCT Blood Glucose.: 157 mg/dL (26 Nov 2017 18:10)      LIVER FUNCTIONS - ( 27 Nov 2017 03:46 )  Alb: 2.5 g/dL / Pro: 5.6 g/dL / ALK PHOS: 67 u/L / ALT: 9 u/L / AST: 15 u/L / GGT: x           PT/INR - ( 26 Nov 2017 03:10 )   PT: 16.0 SEC;   INR: 1.42          PTT - ( 26 Nov 2017 03:10 )  PTT:27.8 SEC    Cultures:       Blood Culture:           11-25 @ 12:46  Organism BLOOD CULTURE PCR  Gram stain --      Urine Culture:          11-25 @ 12:46  Organism BLOOD CULTURE PCR      Wound culture:                11-25 @ 15:47  Organism --  Culture w/ gram stain --  Specimen Source URINE CATHETER    Wound culture:                11-25 @ 12:46  Organism BLOOD CULTURE PCR  Culture w/ gram stain --  Specimen Source BLOOD    Wound culture:                11-23 @ 17:05  Organism --  Culture w/ gram stain --  Specimen Source TRACHEAL ASPIRATE      Abscess culture:             11-25 @ 15:47  Organism --  Gram Stain --  Specimen Source URINE CATHETER    Abscess culture:             11-25 @ 12:46  Organism BLOOD CULTURE PCR  Gram Stain --  Specimen Source BLOOD    Abscess culture:             11-23 @ 17:05  Organism --  Gram Stain --  Specimen Source TRACHEAL ASPIRATE      CSF:              11-25 @ 12:46  Organism BLOOD CULTURE PCR  Gram Stain --      Tissue culture:           11-25 @ 15:47  Organism --  Gram Stain --  Specimen Source URINE CATHETER    Tissue culture:           11-25 @ 12:46  Organism BLOOD CULTURE PCR  Gram Stain --  Specimen Source BLOOD    Tissue culture:           11-23 @ 17:05  Organism --  Gram Stain --  Specimen Source TRACHEAL ASPIRATE      Body Fluid Smear & Culture:                        11-25 @ 15:47  AFB Smear  --  Culture Acid Fast Body Fluid w/ Smear  --  Culture Acid Fast Smear Concentrated   --    Culture Results:     --  Specimen Source URINE CATHETER    Body Fluid Smear & Culture:                        11-25 @ 12:46  AFB Smear  --  Culture Acid Fast Body Fluid w/ Smear  --  Culture Acid Fast Smear Concentrated   --    Culture Results:     --  Specimen Source BLOOD    Body Fluid Smear & Culture:                        11-23 @ 17:05  AFB Smear  --  Culture Acid Fast Body Fluid w/ Smear  --  Culture Acid Fast Smear Concentrated   --    Culture Results:     --  Specimen Source TRACHEAL ASPIRATE          < from: Xray Chest 1 View AP- PORTABLE-Urgent (11.25.17 @ 13:48) >    EXAM:  RAD CHEST PORTABLE URGENT        PROCEDURE DATE:  Nov 25 2017         INTERPRETATION:  COMPARISON: 11/24    CLINICAL INDICATION: History of sarcoidosis. NG tube placement    Impression:  Portable chest dated 11/25 at 1:38 PM shows a tracheostomy tube in place.  NG tube is in stomach.  Left axillary surgical changes.  No change in bilateral fibrotic changes with cyst formations and possible   left apical pneumothorax versus bulla.  Heart and mediastinum are stable.                        JULIETTE SMITH M.D., ATTENDING RADIOLOGIST  This document has been electronically signed. Nov 26 2017 11:45AM    < end of copied text >      Studies  Chest X-RAY  CT SCAN Chest   Venous Dopplers: LE:   CT Abdomen  Others

## 2017-11-27 NOTE — PROGRESS NOTE ADULT - SUBJECTIVE AND OBJECTIVE BOX
Follow Up:      Inverval History/ROS:Patient is a 47y old  Male who presents with a chief complaint of hemoptysis (2017 00:32)    Intubated.    Febrile over the weekend.    S/p trach.      Allergies    No Known Allergies    Intolerances        ANTIMICROBIALS:  piperacillin/tazobactam IVPB. 3.375 every 8 hours  vancomycin  IVPB 1000 every 12 hours  voriconazole IVPB 300 every 12 hours      OTHER MEDS:  acetaminophen   Tablet 650 milliGRAM(s) Oral every 6 hours PRN  ALBUTerol    90 MICROgram(s) HFA Inhaler 2 Puff(s) Inhalation every 6 hours PRN  buDESOnide 160 MICROgram(s)/formoterol 4.5 MICROgram(s) Inhaler 2 Puff(s) Inhalation two times a day  chlorhexidine 4% Liquid 1 Application(s) Topical daily  fentaNYL   Infusion 2 MICROgram(s)/kG/Hr IV Continuous <Continuous>  fentaNYL   Patch 100 MICROgram(s)/Hr. 1 Patch Transdermal every 48 hours  ferrous    sulfate 325 milliGRAM(s) Oral three times a day with meals  folic acid 1 milliGRAM(s) Oral daily  heparin  Injectable 5000 Unit(s) SubCutaneous every 8 hours  insulin lispro (HumaLOG) corrective regimen sliding scale   SubCutaneous every 6 hours  ipratropium 17 MICROgram(s) HFA Inhaler 1 Puff(s) Inhalation every 6 hours  LORazepam   Injectable 1 milliGRAM(s) IV Push every 4 hours PRN  petrolatum Ophthalmic Ointment 1 Application(s) Both EYES two times a day  polyethylene glycol 3350 17 Gram(s) Oral at bedtime  predniSONE   Tablet 30 milliGRAM(s) Oral daily  propofol Infusion 50 MICROgram(s)/kG/Min IV Continuous <Continuous>  senna Syrup 10 milliLiter(s) Oral at bedtime  valproic  acid Syrup 500 milliGRAM(s) Oral two times a day      Vital Signs Last 24 Hrs  T(C): 38 (2017 08:00), Max: 38.3 (2017 12:00)  T(F): 100.4 (2017 08:00), Max: 100.9 (2017 12:00)  HR: 117 (2017 10:00) (101 - 120)  BP: 87/62 (2017 10:00) (83/66 - 146/94)  BP(mean): 67 (2017 10:00) (67 - 107)  RR: 12 (2017 10:00) (12 - 38)  SpO2: 100% (2017 10:00) (84% - 100%)    PHYSICAL EXAM:  General: [ ] non-toxic  HEAD/EYES: [ ] PERRL [ ] white sclera [ ] icterus  ENT:  [ ] normal [ ] supple [ ] thrush [ ] pharyngeal exudate  Cardiovascular:   [ ] murmur [x ] normal [ ] PPM/AICD  Respiratory:  [ x] clear to ausculation bilaterally  GI:  [x ] soft, non-tender, normal bowel sounds  :  [ ] mock [ ] no CVA tenderness   Musculoskeletal:  [ ] no synovitis  Neurologic:  [ ] non-focal exam   Skin:  [ ] no rash  Lymph: [ ] no lymphadenopathy  Psychiatric:  [ ] appropriate affect [ ] alert & oriented  Lines:  [ x] no phlebitis [ ] central line                                7.9    13.03 )-----------( 114      ( 2017 03:46 )             25.1           141  |  95<L>  |  10  ----------------------------<  108<H>  4.8   |  39<H>  |  0.66    Ca    8.5      2017 03:46  Phos  3.1       Mg     2.4         TPro  5.6<L>  /  Alb  2.5<L>  /  TBili  0.3  /  DBili  x   /  AST  15  /  ALT  9   /  AlkPhos  67        Urinalysis Basic - ( 2017 11:49 )    Color: YELLOW / Appearance: HAZY / S.027 / pH: 6.5  Gluc: 150 / Ketone: TRACE  / Bili: NEGATIVE / Urobili: 1 E.U.   Blood: LARGE / Protein: 100 / Nitrite: POSITIVE   Leuk Esterase: LARGE / RBC: >50 / WBC >50   Sq Epi: x / Non Sq Epi: x / Bacteria: MANY        MICROBIOLOGY:Culture - Respiratory:   Normal Respiratory Katherine  Present  FEW  FEW (17 @ 17:05)      RADIOLOGY:

## 2017-11-27 NOTE — PROGRESS NOTE ADULT - PROBLEM SELECTOR PLAN 7
s/p pleurodesis.  11/10 s/p pleurodesis. Management defer to CTS  11/11 no intervention for now. per CTS.  11/13: has mod pneumothorax stable: defer to thoracic for any intervention!  11/12 no intervention planned. bronchoscopy in future per CTS  11/14: Pt still has left sided pneumothorax: s/p blood patch as well as the talc pleurodesis on the left side: Hard to do any other intervention : would defer to thoracic surgical team!!  11/18 management per CTS. not plan for Chest tube at this moment.  11/19 stable radiographically and clinically. Management defer to CTS  11/22: Has this air space in the left lower lobe following DAVID lobectomy: ? PTX, empty space ? any intervention would be difficult: would defer to ct surgery!!  11/27/2017: for rpt ct scan chest now

## 2017-11-27 NOTE — PROGRESS NOTE ADULT - ASSESSMENT
47M with hx of sarcoidosis, started on Eqliuis for cerebrovascular infarct with PFO on echo and paroxysmal afib in September c/b recurrent hemoptysis (Eliquis d/c'd on 9/20, then restarted between 10/23-27 after hemoptysis improved), d/c'd to rehab then returned 11/9 for recurrent hemoptysis requiring intubation and CTICU admission for management of hemoptysis including IR embolization (11/16) and serial bronch (11/17 and 11/20) in CTICU with no active bleeding visualized, now remains intubated with difficulty weaning vent as during CPAP trials pt fails to initiate breaths, unclear etiology. Transferred to MICU from CTICU for continued vent management and attempted weaning. Bedside trach placed 3 days ago c/b episode of hypoxia and bleeding around trach site after suctioning, resolved after surgicel and pressure. Has been febrile over the past 3 days (no fevers overnight) so repeat cultures drawn, likely UTI vs PNA, started on antibiotics. Also with tachypnea to 50's overnight with blood secretions and had to be restarted on fentanyl.     # Neuro:  -on propofol gtt at 30mcg/kg/min; attempts to wean unsuccessful as becomes anxious.   -Fentanyl gtt for pain control; d/c gtt and try to manage with fentanyl patch, ativan prn anxiety  -Able to follow commands, trying to wean sedation but becomes discordant and anxious off sedation    #Cardiovascular:  -Requiring intermittent pressors, off levophed x >24 hours now  -A-line in place for BP monitoring, remove today in setting of active infection    #Respiratory:  -Trach placed 3 days ago at bedside  -Intermittent episodes of tachypnea, significant this morning  -Bloody secretions from trach that appear old; may need repeat bronch today  -Continue bronchodilators and pulmonary toilet    #GI:  -Restarted feeds yesterday via new KO tube   -Had 3 small bowel movements yesterday     #Renal:  -Monitor I's and O's (net +1L)  -No active issues    #Heme:  -H/H stable; baseline Hb 9-10  -Improving thrombocytopenia vs reactive from infection    #ID:  -Aspergilloma dx 9/24/17, on voriconazole x 6 weeks per ID recommendations. Recommend continuing voriconazole given hemoptysis recurred after was stopped, possible related to inflammation 2/2 aspergilloma and possible continued infection  -Recultured and started on antibiotics yesterday for fevers up to 103F. Likely urinary source, f/u blood/urine cx. Blood cx from 11/25 with Gram positive cocci in clusters, coag negative likely contaminant but restarted on vancomycin and repeat blood cx drawan.     #Endocrine:   -Hypoglycemia 2 days ago, required D5 during the day, KO tube placed and feeds restarted with normal blood glucose levels overnight so D5 stopped yesterday.   -repeat electrolytes PRN    #DVT PPX: HSQ    RONAL Cantu, PGY3  MICU  [id number]

## 2017-11-27 NOTE — PROGRESS NOTE ADULT - SUBJECTIVE AND OBJECTIVE BOX
CHIEF COMPLAINT: Hemoptysis    Interval Events: Tachypneic to 50's overnight, pulling low tidal volumes on vent. Vent settings changed to 420/12/60 with PEEP 10, had bloody secretions from trach and     REVIEW OF SYSTEMS:  Constitutional: [ ] negative [ ] fevers [ ] chills [ ] weight loss [ ] weight gain  HEENT: [ ] negative [ ] dry eyes [ ] eye irritation [ ] postnasal drip [ ] nasal congestion  CV: [ ] negative  [ ] chest pain [ ] orthopnea [ ] palpitations [ ] murmur  Resp: [ ] negative [ ] cough [ ] shortness of breath [ ] dyspnea [ ] wheezing [ ] sputum [ ] hemoptysis  GI: [ ] negative [ ] nausea [ ] vomiting [ ] diarrhea [ ] constipation [ ] abd pain [ ] dysphagia   : [ ] negative [ ] dysuria [ ] nocturia [ ] hematuria [ ] increased urinary frequency  Musculoskeletal: [ ] negative [ ] back pain [ ] myalgias [ ] arthralgias [ ] fracture  Skin: [ ] negative [ ] rash [ ] itch  Neurological: [ ] negative [ ] headache [ ] dizziness [ ] syncope [ ] weakness [ ] numbness  Psychiatric: [ ] negative [ ] anxiety [ ] depression  Endocrine: [ ] negative [ ] diabetes [ ] thyroid problem  Hematologic/Lymphatic: [ ] negative [ ] anemia [ ] bleeding problem  Allergic/Immunologic: [ ] negative [ ] itchy eyes [ ] nasal discharge [ ] hives [ ] angioedema  [ ] All other systems negative  [ ] Unable to assess ROS because ________    OBJECTIVE:  ICU Vital Signs Last 24 Hrs  T(C): 37.7 (2017 04:00), Max: 38.3 (2017 12:00)  T(F): 99.9 (2017 04:00), Max: 100.9 (2017 12:00)  HR: 111 (2017 05:05) (101 - 123)  BP: 97/67 (2017 05:05) (94/59 - 146/94)  BP(mean): 73 (2017 05:05) (67 - 107)  ABP: 127/69 (2017 16:00) (100/52 - 169/84)  ABP(mean): 88 (2017 16:00) (67 - 110)  RR: 19 (2017 05:05) (12 - 38)  SpO2: 97% (2017 05:05) (84% - 100%)    Mode: AC/ CMV (Assist Control/ Continuous Mandatory Ventilation), RR (machine): 12, TV (machine): 450, FiO2: 40, PEEP: 8, MAP: 14, PIP: 36     @ 07:01  -   @ 07:00  --------------------------------------------------------  IN: 2779.1 mL / OUT: 925 mL / NET: 1854.1 mL      CAPILLARY BLOOD GLUCOSE      POCT Blood Glucose.: 104 mg/dL (2017 06:28)      PHYSICAL EXAM:  General:   HEENT:   Lymph Nodes:  Neck:   Respiratory:   Cardiovascular:   Abdomen:   Extremities:   Skin:   Neurological:  Psychiatry:    LINES:    HOSPITAL MEDICATIONS:  Standing Meds:  buDESOnide 160 MICROgram(s)/formoterol 4.5 MICROgram(s) Inhaler 2 Puff(s) Inhalation two times a day  chlorhexidine 4% Liquid 1 Application(s) Topical daily  fentaNYL   Infusion 2 MICROgram(s)/kG/Hr IV Continuous <Continuous>  fentaNYL   Patch 100 MICROgram(s)/Hr. 1 Patch Transdermal every 48 hours  ferrous    sulfate 325 milliGRAM(s) Oral three times a day with meals  folic acid 1 milliGRAM(s) Oral daily  heparin  Injectable 5000 Unit(s) SubCutaneous every 8 hours  insulin lispro (HumaLOG) corrective regimen sliding scale   SubCutaneous every 6 hours  ipratropium 17 MICROgram(s) HFA Inhaler 1 Puff(s) Inhalation every 6 hours  petrolatum Ophthalmic Ointment 1 Application(s) Both EYES two times a day  piperacillin/tazobactam IVPB. 3.375 Gram(s) IV Intermittent every 8 hours  polyethylene glycol 3350 17 Gram(s) Oral at bedtime  predniSONE   Tablet 30 milliGRAM(s) Oral daily  propofol Infusion 50 MICROgram(s)/kG/Min IV Continuous <Continuous>  senna Syrup 5 milliLiter(s) Oral three times a day  valproic  acid Syrup 500 milliGRAM(s) Oral two times a day  vancomycin  IVPB 1000 milliGRAM(s) IV Intermittent every 12 hours  voriconazole IVPB 300 milliGRAM(s) IV Intermittent every 12 hours      PRN Meds:  acetaminophen   Tablet 650 milliGRAM(s) Oral every 6 hours PRN  ALBUTerol    90 MICROgram(s) HFA Inhaler 2 Puff(s) Inhalation every 6 hours PRN  LORazepam   Injectable 1 milliGRAM(s) IV Push every 4 hours PRN      LABS:                        7.9    13.03 )-----------( 114      ( 2017 03:46 )             25.1     Hgb Trend: 7.9<--, 7.8<--, 8.2<--, 7.7<--, 8.1<--  1127    141  |  95<L>  |  10  ----------------------------<  108<H>  4.8   |  39<H>  |  0.66    Ca    8.5      2017 03:46  Phos  3.1       Mg     2.4         TPro  5.6<L>  /  Alb  2.5<L>  /  TBili  0.3  /  DBili  x   /  AST  15  /  ALT  9   /  AlkPhos  67      Creatinine Trend: 0.66<--, 0.72<--, 0.65<--, 0.67<--, 0.56<--, 0.64<--  PT/INR - ( 2017 03:10 )   PT: 16.0 SEC;   INR: 1.42          PTT - ( 2017 03:10 )  PTT:27.8 SEC  Urinalysis Basic - ( 2017 11:49 )    Color: YELLOW / Appearance: HAZY / S.027 / pH: 6.5  Gluc: 150 / Ketone: TRACE  / Bili: NEGATIVE / Urobili: 1 E.U.   Blood: LARGE / Protein: 100 / Nitrite: POSITIVE   Leuk Esterase: LARGE / RBC: >50 / WBC >50   Sq Epi: x / Non Sq Epi: x / Bacteria: MANY      Arterial Blood Gas:   @ 06:37  7.30/88/194/37/99.6/14.6  ABG lactate: --        MICROBIOLOGY:     Culture - Urine (collected 2017 15:47)  Source: URINE CATHETER  Preliminary Report (2017 09:50):    GNRID^Gram Neg Jordan To Be Identified    COLONY COUNT: > = 100,000 CFU/ML    Culture - Blood (collected 2017 12:46)  Source: BLOOD VENOUS  Preliminary Report (2017 12:46):    NO ORGANISMS ISOLATED    NO ORGANISMS ISOLATED AT 24 HOURS    Culture - Blood (collected 2017 12:46)  Source: BLOOD  Preliminary Report (2017 17:45):    ***Blood Panel PCR results on this specimen are available    approximately 3 hours after the Gram stain result***    Gram stain, PCR, and/or culture results may not always    correspond due to difference in methodologies    ------------------------------------------------------------    This PCR assay was performed using [employer].  The    following targets are tested for:  Enterococcus, vancomycin    resistant enterococci, Listeria monocytogenes,  coagulase    negative staphylococci, S. aureus, methicillin resistant S.    aureus, Streptococcus agalactiae (Group B), S. pneumoniae,    S. pyogenes (Group A), Acinetobacter baumannii, Enterobacter    cloacae, E. coli, Klebsiella oxytoca, K. pneumoniae, Proteus    sp., Serratia marcescens, Haemophilus influenzae, Neisseria    meningitidis, Pseudomonas aeruginosa, Candida albicans, C.    glabrata, C. krusei, C. parapsilosis, C. tropicalis and the    KPC resistance gene.  Preliminary Report (2017 12:46):    NO ORGANISMS ISOLATED    NO ORGANISMS ISOLATED AT 24 HOURS  Organism: BLOOD CULTURE PCR (2017 17:45)  Organism: BLOOD CULTURE PCR (2017 17:45)      RADIOLOGY:  [ ] Reviewed and interpreted by me    EKG: CHIEF COMPLAINT: Hemoptysis    Interval Events: Tachypneic to 50's overnight, pulling low tidal volumes on vent. Vent settings changed to 500/12/60 with PEEP 10, had bloody secretions from trach and required restarting of fentanyl.     REVIEW OF SYSTEMS:  Constitutional: [ ] negative [ ] fevers [ ] chills [ ] weight loss [ ] weight gain  HEENT: [ ] negative [ ] dry eyes [ ] eye irritation [ ] postnasal drip [ ] nasal congestion  CV: [ ] negative  [ ] chest pain [ ] orthopnea [ ] palpitations [ ] murmur  Resp: [ ] negative [ ] cough [ ] shortness of breath [ ] dyspnea [ ] wheezing [ ] sputum [ ] hemoptysis  GI: [ ] negative [ ] nausea [ ] vomiting [ ] diarrhea [ ] constipation [ ] abd pain [ ] dysphagia   : [ ] negative [ ] dysuria [ ] nocturia [ ] hematuria [ ] increased urinary frequency  Musculoskeletal: [ ] negative [ ] back pain [ ] myalgias [ ] arthralgias [ ] fracture  Skin: [ ] negative [ ] rash [ ] itch  Neurological: [ ] negative [ ] headache [ ] dizziness [ ] syncope [ ] weakness [ ] numbness  Psychiatric: [ ] negative [ ] anxiety [ ] depression  Endocrine: [ ] negative [ ] diabetes [ ] thyroid problem  Hematologic/Lymphatic: [ ] negative [ ] anemia [ ] bleeding problem  Allergic/Immunologic: [ ] negative [ ] itchy eyes [ ] nasal discharge [ ] hives [ ] angioedema  [ ] All other systems negative  [x ] Unable to assess ROS because less responsive this morning to questions, though continues to follow commands    OBJECTIVE:  ICU Vital Signs Last 24 Hrs  T(C): 37.7 (2017 04:00), Max: 38.3 (2017 12:00)  T(F): 99.9 (2017 04:00), Max: 100.9 (2017 12:00)  HR: 111 (2017 05:05) (101 - 123)  BP: 97/67 (2017 05:05) (94/59 - 146/94)  BP(mean): 73 (2017 05:05) (67 - 107)  ABP: 127/69 (2017 16:00) (100/52 - 169/84)  ABP(mean): 88 (2017 16:00) (67 - 110)  RR: 19 (2017 05:05) (12 - 38)  SpO2: 97% (2017 05:05) (84% - 100%)    Mode: AC/ CMV (Assist Control/ Continuous Mandatory Ventilation), RR (machine): 12, TV (machine): 450, FiO2: 40, PEEP: 8, MAP: 14, PIP: 36    11- @ 07:01  -   @ 07:00  --------------------------------------------------------  IN: 2779.1 mL / OUT: 925 mL / NET: 1854.1 mL      CAPILLARY BLOOD GLUCOSE      POCT Blood Glucose.: 104 mg/dL (2017 06:28)      PHYSICAL EXAM:  General: ill-appearing, follows some commands  HEENT: no changes  Lymph Nodes: no adenopathy  Neck: trach in place with old bloody secretions from suctioning, no active bleeding around trach  Respiratory: b/l rhonchi  Cardiovascular: tachycardic, no MRG  Abdomen: soft, NTND  Extremities: some LE edema  Skin: intact  Neurological: able to move all extremities  Psychiatry: becomes anxious off sedation    LINES: PIV, trach, KO tube for feeds    HOSPITAL MEDICATIONS:  Standing Meds:  buDESOnide 160 MICROgram(s)/formoterol 4.5 MICROgram(s) Inhaler 2 Puff(s) Inhalation two times a day  chlorhexidine 4% Liquid 1 Application(s) Topical daily  fentaNYL   Infusion 2 MICROgram(s)/kG/Hr IV Continuous <Continuous>  fentaNYL   Patch 100 MICROgram(s)/Hr. 1 Patch Transdermal every 48 hours  ferrous    sulfate 325 milliGRAM(s) Oral three times a day with meals  folic acid 1 milliGRAM(s) Oral daily  heparin  Injectable 5000 Unit(s) SubCutaneous every 8 hours  insulin lispro (HumaLOG) corrective regimen sliding scale   SubCutaneous every 6 hours  ipratropium 17 MICROgram(s) HFA Inhaler 1 Puff(s) Inhalation every 6 hours  petrolatum Ophthalmic Ointment 1 Application(s) Both EYES two times a day  piperacillin/tazobactam IVPB. 3.375 Gram(s) IV Intermittent every 8 hours  polyethylene glycol 3350 17 Gram(s) Oral at bedtime  predniSONE   Tablet 30 milliGRAM(s) Oral daily  propofol Infusion 50 MICROgram(s)/kG/Min IV Continuous <Continuous>  senna Syrup 5 milliLiter(s) Oral three times a day  valproic  acid Syrup 500 milliGRAM(s) Oral two times a day  vancomycin  IVPB 1000 milliGRAM(s) IV Intermittent every 12 hours  voriconazole IVPB 300 milliGRAM(s) IV Intermittent every 12 hours      PRN Meds:  acetaminophen   Tablet 650 milliGRAM(s) Oral every 6 hours PRN  ALBUTerol    90 MICROgram(s) HFA Inhaler 2 Puff(s) Inhalation every 6 hours PRN  LORazepam   Injectable 1 milliGRAM(s) IV Push every 4 hours PRN      LABS:                        7.9    13.03 )-----------( 114      ( 2017 03:46 )             25.1     Hgb Trend: 7.9<--, 7.8<--, 8.2<--, 7.7<--, 8.1<--  1127    141  |  95<L>  |  10  ----------------------------<  108<H>  4.8   |  39<H>  |  0.66    Ca    8.5      2017 03:46  Phos  3.1       Mg     2.4         TPro  5.6<L>  /  Alb  2.5<L>  /  TBili  0.3  /  DBili  x   /  AST  15  /  ALT  9   /  AlkPhos  67      Creatinine Trend: 0.66<--, 0.72<--, 0.65<--, 0.67<--, 0.56<--, 0.64<--  PT/INR - ( 2017 03:10 )   PT: 16.0 SEC;   INR: 1.42          PTT - ( 2017 03:10 )  PTT:27.8 SEC  Urinalysis Basic - ( 2017 11:49 )    Color: YELLOW / Appearance: HAZY / S.027 / pH: 6.5  Gluc: 150 / Ketone: TRACE  / Bili: NEGATIVE / Urobili: 1 E.U.   Blood: LARGE / Protein: 100 / Nitrite: POSITIVE   Leuk Esterase: LARGE / RBC: >50 / WBC >50   Sq Epi: x / Non Sq Epi: x / Bacteria: MANY      Arterial Blood Gas:  11- @ 06:37  7.30/88/194/37/99.6/14.6  ABG lactate: --        MICROBIOLOGY:     Culture - Urine (collected 2017 15:47)  Source: URINE CATHETER  Preliminary Report (2017 09:50):    GNRID^Gram Neg Jordan To Be Identified    COLONY COUNT: > = 100,000 CFU/ML    Culture - Blood (collected 2017 12:46)  Source: BLOOD VENOUS  Preliminary Report (2017 12:46):    NO ORGANISMS ISOLATED    NO ORGANISMS ISOLATED AT 24 HOURS    Culture - Blood (collected 2017 12:46)  Source: BLOOD  Preliminary Report (2017 17:45):    ***Blood Panel PCR results on this specimen are available    approximately 3 hours after the Gram stain result***    Gram stain, PCR, and/or culture results may not always    correspond due to difference in methodologies    ------------------------------------------------------------    This PCR assay was performed using "[employer]".  The    following targets are tested for:  Enterococcus, vancomycin    resistant enterococci, Listeria monocytogenes,  coagulase    negative staphylococci, S. aureus, methicillin resistant S.    aureus, Streptococcus agalactiae (Group B), S. pneumoniae,    S. pyogenes (Group A), Acinetobacter baumannii, Enterobacter    cloacae, E. coli, Klebsiella oxytoca, K. pneumoniae, Proteus    sp., Serratia marcescens, Haemophilus influenzae, Neisseria    meningitidis, Pseudomonas aeruginosa, Candida albicans, C.    glabrata, C. krusei, C. parapsilosis, C. tropicalis and the    KPC resistance gene.  Preliminary Report (2017 12:46):    NO ORGANISMS ISOLATED    NO ORGANISMS ISOLATED AT 24 HOURS  Organism: BLOOD CULTURE PCR (2017 17:45)  Organism: BLOOD CULTURE PCR (2017 17:45)      RADIOLOGY:  [ ] Reviewed and interpreted by me    EKG:

## 2017-11-27 NOTE — CHART NOTE - NSCHARTNOTEFT_GEN_A_CORE
: Dr. Carol Miller    INDICATION: tachypnea and tachycardia overnight    PROCEDURE:  [x] LIMITED ECHO  [x] LIMITED CHEST  [ ] LIMITED RETROPERITONEAL  [ ] LIMITED ABDOMINAL  [ ] LIMITED DVT  [ ] NEEDLE GUIDANCE VASCULAR  [ ] NEEDLE GUIDANCE THORACENTESIS  [ ] NEEDLE GUIDANCE PARACENTESIS  [ ] NEEDLE GUIDANCE PERICARDIOCENTESIS  [ ] OTHER    FINDINGS:  Limited Chest  Lumpy-bumpy pleural abnormality noted. No pleural effusions noted.     Limited Echo  Normal LV function, tachycardic.     Limited abdomen:   Bilateral kidneys appear normal size     INTERPRETATION: Kidneys appear normal size. Lumpy bumpy pleura noted. No pleural effusions. Normal LV function.

## 2017-11-27 NOTE — PROGRESS NOTE ADULT - PROBLEM SELECTOR PLAN 4
Cont BD with inhaled steroids and oral steroids  11/114: Pts wheezing has significantly improved: he will need long term steroids as he starts wheezing once his steroids are dced!  11/15: Wheezing has increased: started on IV steroids high dose by thoracic team: Would suggest to decrease to 20 mg three times a day !  11/16: decrease steroids today to 20 mg three times day  11/17: try to decrease steroids in AM  11/18 Duoneb. intubated for acute respiratory distress with hypoxia  11/19 KIARA. intubated and on ventilator support  11/20: cont vent support: start weaning as tolerated!  11/21: would decrease  his steroids to 30 mg a day from tomorrow  11/22: DECREASE STEROIDS: HE WHEEZES WHEN THE STEROIDS ARE TAPERED: BUT NEED TO BE LOWERED NOW AND WATCH!!  11/23: on 40 mg of prednisone: try to decrease and taper  11/24: prednisone decreased to 30 ,g a day !!  11/25: on 30 mg a day now:  11/27: on steroids: for repeat ct scan chest today

## 2017-11-27 NOTE — PROGRESS NOTE ADULT - SUBJECTIVE AND OBJECTIVE BOX
HPI 11/13:  Mr. Dickens is a 47 year old M with PMHx of sarcoidosis, HTN, COPD, and asthma. He is known to our service from recent prolonged admission from 9/10/17-11/2/17. He had presented with severe leg spasms. 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 Also + rhino/enterovirus, COPD exacerbation, intubation. 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 Followed by ID, on voriconazole until 11/4. S/p chest tube, blood patch procedure, and eventual pleurodesis for air leak.      Discharged to [facility] on 11/2. Per discussion with [facility], patient able to tolerate therapy, on O2 via NC. Tolerated room air during the daytime for a few hours at rest. CT chest 11/3 at [facility] showed left pneumothorax, small left pleural effusion. Transferred to [facility] 11/8 due to hemoptysis (per report approx 1/4 cup blood in several hours). Eliquis on hold since admission to [facility].  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 CTA chest 11/10--> No active pulmonary hemorrhage. Unchanged left pneumothorax. Unchanged patchy peribronchial opacities and cystic changes, possibly related to patient's known sarcoidosis. Unchanged small left pleural effusion mixed density, possible element of proteinaceous or hemorrhagic fluid. No intervention at this time, per CTS may need bronchoscopy in future. Cardiology recommend systemic AC for Afib, recommend heparin to coumadin bridge, state would not pursue PFO closure.    Interval history 11/27: Required intubation/transfer to CTICU for continued hemoptysis. Patient s/p IR embolization on 11/16, bronch on 11/17 and 11/20. Remained intubated with difficulty weaning off vent. Transferred to MICU. S/p trach 11/24, complicated by hypoxia/bleeding around trach site, resolved. Also episodes of fever, repeat cultures drawn and started on Abx on 11/25-11/26. Patient with noted anxiety when attempted to wean off sedation. Per MICU team patient following commands when sedation weaned. 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    FUNCTIONAL PROGRESS - Eval    REVIEW OF SYSTEMS - Unable to assess due to sedation    VITALS  T(C): 38 (11-27-17 @ 12:00), Max: 38 (11-26-17 @ 20:00)  HR: 109 (11-27-17 @ 14:00) (101 - 119)  BP: 106/65 (11-27-17 @ 14:00) (74/50 - 146/94)  RR: 12 (11-27-17 @ 14:00) (12 - 38)  SpO2: 100% (11-27-17 @ 14:00) (84% - 100%)  Wt(kg): --    MEDICATIONS   acetaminophen   Tablet 650 milliGRAM(s) every 6 hours PRN  ALBUTerol    90 MICROgram(s) HFA Inhaler 2 Puff(s) every 6 hours PRN  buDESOnide 160 MICROgram(s)/formoterol 4.5 MICROgram(s) Inhaler 2 Puff(s) two times a day  chlorhexidine 4% Liquid 1 Application(s) daily  fentaNYL   Infusion 2 MICROgram(s)/kG/Hr <Continuous>  fentaNYL   Patch 100 MICROgram(s)/Hr. 1 Patch every 48 hours  ferrous    sulfate 325 milliGRAM(s) three times a day with meals  folic acid 1 milliGRAM(s) daily  heparin  Injectable 5000 Unit(s) every 8 hours  insulin lispro (HumaLOG) corrective regimen sliding scale   every 6 hours  ipratropium 17 MICROgram(s) HFA Inhaler 1 Puff(s) every 6 hours  LORazepam   Injectable 1 milliGRAM(s) every 4 hours PRN  petrolatum Ophthalmic Ointment 1 Application(s) two times a day  piperacillin/tazobactam IVPB. 3.375 Gram(s) every 8 hours  polyethylene glycol 3350 17 Gram(s) at bedtime  predniSONE   Tablet 30 milliGRAM(s) daily  propofol Infusion 50 MICROgram(s)/kG/Min <Continuous>  senna Syrup 10 milliLiter(s) at bedtime  valproic  acid Syrup 500 milliGRAM(s) two times a day  vancomycin  IVPB 1000 milliGRAM(s) every 12 hours  voriconazole IVPB 300 milliGRAM(s) every 12 hours    RECENT LABS/IMAGING  CBC Full  -  ( 27 Nov 2017 03:46 )  WBC Count : 13.03 K/uL  Hemoglobin : 7.9 g/dL  Hematocrit : 25.1 %  Platelet Count - Automated : 114 K/uL  Mean Cell Volume : 96.9 fL  Mean Cell Hemoglobin : 30.5 pg  Mean Cell Hemoglobin Concentration : 31.5 %  Auto Neutrophil # : 10.56 K/uL  Auto Lymphocyte # : 0.99 K/uL  Auto Monocyte # : 1.35 K/uL  Auto Eosinophil # : 0.01 K/uL  Auto Basophil # : 0.01 K/uL  Auto Neutrophil % : 81.0 %  Auto Lymphocyte % : 7.6 %  Auto Monocyte % : 10.4 %  Auto Eosinophil % : 0.1 %  Auto Basophil % : 0.1 %    11-27    141  |  95<L>  |  10  ----------------------------<  108<H>  4.8   |  39<H>  |  0.66    Ca    8.5      27 Nov 2017 03:46  Phos  3.1     11-27  Mg     2.4     11-27    TPro  5.6<L>  /  Alb  2.5<L>  /  TBili  0.3  /  DBili  x   /  AST  15  /  ALT  9   /  AlkPhos  67  11-27    ---------  PHYSICAL EXAM  Constitutional - NAD, trach/vent, sedated  Extremities - Edema bilateral UEs  Neurologic Exam -                 -Motor - PROM LEs, tone normal     ASSESSMENT/PLAN  48 y/o M known to our service with recent prolonged admission for bilateral MCA and R PCA CVA, hemoptysis/aspergillosis s/p DAVID thoracotomy, discharged to [facility] and readmitted 11/8 for hemoptysis. Now s/p intubation, s/p trach on 11/24, remains sedated/on vent support. Patient with left hemiparesis, deconditioning, compromised pulmonary status, functional, gait, ADL impairments.    -PT/OT once weaned off sedation  -Disposition - Will follow along patient's progress.   -Precautions - Falls, Cardiac, pulmonary, seizure  -DVT Prophylaxis - heparin SQ  -Nutrition - NPO/tube feeds

## 2017-11-28 LAB
-  CEFAZOLIN: SIGNIFICANT CHANGE UP
-  CIPROFLOXACIN: SIGNIFICANT CHANGE UP
-  CLINDAMYCIN: SIGNIFICANT CHANGE UP
-  COAGULASE NEGATIVE STAPHYLOCOCCUS: SIGNIFICANT CHANGE UP
-  ERYTHROMYCIN: SIGNIFICANT CHANGE UP
-  GENTAMICIN: SIGNIFICANT CHANGE UP
-  MOXIFLOXACIN(AEROBIC): SIGNIFICANT CHANGE UP
-  OXACILLIN: SIGNIFICANT CHANGE UP
-  PENICILLIN: SIGNIFICANT CHANGE UP
-  RIFAMPIN.: SIGNIFICANT CHANGE UP
-  TETRACYCLINE: SIGNIFICANT CHANGE UP
-  TRIMETHOPRIM/SULFAMETHOXAZOLE: SIGNIFICANT CHANGE UP
-  VANCOMYCIN: SIGNIFICANT CHANGE UP
ALBUMIN SERPL ELPH-MCNC: 2.4 G/DL — LOW (ref 3.3–5)
ALP SERPL-CCNC: 61 U/L — SIGNIFICANT CHANGE UP (ref 40–120)
ALT FLD-CCNC: 8 U/L — SIGNIFICANT CHANGE UP (ref 4–41)
AST SERPL-CCNC: 12 U/L — SIGNIFICANT CHANGE UP (ref 4–40)
BACTERIA BLD CULT: SIGNIFICANT CHANGE UP
BACTERIA BLD CULT: SIGNIFICANT CHANGE UP
BASE EXCESS BLDA CALC-SCNC: 15.5 MMOL/L — SIGNIFICANT CHANGE UP
BASE EXCESS BLDA CALC-SCNC: 16.1 MMOL/L — SIGNIFICANT CHANGE UP
BASOPHILS # BLD AUTO: 0 K/UL — SIGNIFICANT CHANGE UP (ref 0–0.2)
BASOPHILS NFR BLD AUTO: 0 % — SIGNIFICANT CHANGE UP (ref 0–2)
BILIRUB SERPL-MCNC: 0.2 MG/DL — SIGNIFICANT CHANGE UP (ref 0.2–1.2)
BUN SERPL-MCNC: 12 MG/DL — SIGNIFICANT CHANGE UP (ref 7–23)
CALCIUM SERPL-MCNC: 8.2 MG/DL — LOW (ref 8.4–10.5)
CHLORIDE SERPL-SCNC: 95 MMOL/L — LOW (ref 98–107)
CO2 SERPL-SCNC: 40 MMOL/L — HIGH (ref 22–31)
CREAT SERPL-MCNC: 0.73 MG/DL — SIGNIFICANT CHANGE UP (ref 0.5–1.3)
EOSINOPHIL # BLD AUTO: 0.01 K/UL — SIGNIFICANT CHANGE UP (ref 0–0.5)
EOSINOPHIL NFR BLD AUTO: 0.1 % — SIGNIFICANT CHANGE UP (ref 0–6)
GLUCOSE BLDC GLUCOMTR-MCNC: 118 MG/DL — HIGH (ref 70–99)
GLUCOSE BLDC GLUCOMTR-MCNC: 138 MG/DL — HIGH (ref 70–99)
GLUCOSE BLDC GLUCOMTR-MCNC: 149 MG/DL — HIGH (ref 70–99)
GLUCOSE BLDC GLUCOMTR-MCNC: 172 MG/DL — HIGH (ref 70–99)
GLUCOSE SERPL-MCNC: 144 MG/DL — HIGH (ref 70–99)
HCO3 BLDA-SCNC: 39 MMOL/L — HIGH (ref 22–26)
HCO3 BLDA-SCNC: 40 MMOL/L — HIGH (ref 22–26)
HCT VFR BLD CALC: 22.8 % — LOW (ref 39–50)
HGB BLD-MCNC: 7.2 G/DL — LOW (ref 13–17)
IMM GRANULOCYTES # BLD AUTO: 0.06 # — SIGNIFICANT CHANGE UP
IMM GRANULOCYTES NFR BLD AUTO: 0.6 % — SIGNIFICANT CHANGE UP (ref 0–1.5)
LYMPHOCYTES # BLD AUTO: 0.89 K/UL — LOW (ref 1–3.3)
LYMPHOCYTES # BLD AUTO: 8.2 % — LOW (ref 13–44)
MAGNESIUM SERPL-MCNC: 2.2 MG/DL — SIGNIFICANT CHANGE UP (ref 1.6–2.6)
MCHC RBC-ENTMCNC: 31.3 PG — SIGNIFICANT CHANGE UP (ref 27–34)
MCHC RBC-ENTMCNC: 31.6 % — LOW (ref 32–36)
MCV RBC AUTO: 99.1 FL — SIGNIFICANT CHANGE UP (ref 80–100)
METHOD TYPE: SIGNIFICANT CHANGE UP
MONOCYTES # BLD AUTO: 1.03 K/UL — HIGH (ref 0–0.9)
MONOCYTES NFR BLD AUTO: 9.5 % — SIGNIFICANT CHANGE UP (ref 2–14)
NEUTROPHILS # BLD AUTO: 8.81 K/UL — HIGH (ref 1.8–7.4)
NEUTROPHILS NFR BLD AUTO: 81.6 % — HIGH (ref 43–77)
NRBC # FLD: 0 — SIGNIFICANT CHANGE UP
ORGANISM # SPEC MICROSCOPIC CNT: SIGNIFICANT CHANGE UP
PCO2 BLDA: 57 MMHG — HIGH (ref 35–48)
PCO2 BLDA: 64 MMHG — HIGH (ref 35–48)
PH BLDA: 7.43 PH — SIGNIFICANT CHANGE UP (ref 7.35–7.45)
PH BLDA: 7.47 PH — HIGH (ref 7.35–7.45)
PHOSPHATE SERPL-MCNC: 3 MG/DL — SIGNIFICANT CHANGE UP (ref 2.5–4.5)
PLATELET # BLD AUTO: 111 K/UL — LOW (ref 150–400)
PMV BLD: 12.8 FL — SIGNIFICANT CHANGE UP (ref 7–13)
PO2 BLDA: 136 MMHG — HIGH (ref 83–108)
PO2 BLDA: 215 MMHG — HIGH (ref 83–108)
POTASSIUM SERPL-MCNC: 4.3 MMOL/L — SIGNIFICANT CHANGE UP (ref 3.5–5.3)
POTASSIUM SERPL-SCNC: 4.3 MMOL/L — SIGNIFICANT CHANGE UP (ref 3.5–5.3)
PROT SERPL-MCNC: 5.4 G/DL — LOW (ref 6–8.3)
RBC # BLD: 2.3 M/UL — LOW (ref 4.2–5.8)
RBC # FLD: 15.2 % — HIGH (ref 10.3–14.5)
SAO2 % BLDA: 99.7 % — HIGH (ref 95–99)
SAO2 % BLDA: 99.9 % — HIGH (ref 95–99)
SODIUM SERPL-SCNC: 142 MMOL/L — SIGNIFICANT CHANGE UP (ref 135–145)
VANCOMYCIN TROUGH SERPL-MCNC: 10 UG/ML — SIGNIFICANT CHANGE UP (ref 10–20)
WBC # BLD: 10.8 K/UL — HIGH (ref 3.8–10.5)
WBC # FLD AUTO: 10.8 K/UL — HIGH (ref 3.8–10.5)

## 2017-11-28 PROCEDURE — 31624 DX BRONCHOSCOPE/LAVAGE: CPT

## 2017-11-28 PROCEDURE — 99291 CRITICAL CARE FIRST HOUR: CPT | Mod: 25

## 2017-11-28 PROCEDURE — 71010: CPT | Mod: 26

## 2017-11-28 PROCEDURE — 32551 INSERTION OF CHEST TUBE: CPT

## 2017-11-28 RX ORDER — CISATRACURIUM BESYLATE 2 MG/ML
20 INJECTION INTRAVENOUS ONCE
Qty: 0 | Refills: 0 | Status: COMPLETED | OUTPATIENT
Start: 2017-11-28 | End: 2017-11-28

## 2017-11-28 RX ORDER — ENOXAPARIN SODIUM 100 MG/ML
40 INJECTION SUBCUTANEOUS DAILY
Qty: 0 | Refills: 0 | Status: DISCONTINUED | OUTPATIENT
Start: 2017-11-28 | End: 2018-01-10

## 2017-11-28 RX ORDER — PROPOFOL 10 MG/ML
100 INJECTION, EMULSION INTRAVENOUS ONCE
Qty: 0 | Refills: 0 | Status: DISCONTINUED | OUTPATIENT
Start: 2017-11-28 | End: 2017-11-28

## 2017-11-28 RX ORDER — PROPOFOL 10 MG/ML
50 INJECTION, EMULSION INTRAVENOUS ONCE
Qty: 0 | Refills: 0 | Status: COMPLETED | OUTPATIENT
Start: 2017-11-28 | End: 2017-11-28

## 2017-11-28 RX ORDER — CEFTRIAXONE 500 MG/1
1 INJECTION, POWDER, FOR SOLUTION INTRAMUSCULAR; INTRAVENOUS ONCE
Qty: 0 | Refills: 0 | Status: COMPLETED | OUTPATIENT
Start: 2017-11-28 | End: 2017-11-28

## 2017-11-28 RX ORDER — CEFTRIAXONE 500 MG/1
1 INJECTION, POWDER, FOR SOLUTION INTRAMUSCULAR; INTRAVENOUS EVERY 24 HOURS
Qty: 0 | Refills: 0 | Status: DISCONTINUED | OUTPATIENT
Start: 2017-11-29 | End: 2017-12-01

## 2017-11-28 RX ORDER — VANCOMYCIN HCL 1 G
1000 VIAL (EA) INTRAVENOUS EVERY 8 HOURS
Qty: 0 | Refills: 0 | Status: DISCONTINUED | OUTPATIENT
Start: 2017-11-28 | End: 2017-12-04

## 2017-11-28 RX ORDER — CEFTRIAXONE 500 MG/1
INJECTION, POWDER, FOR SOLUTION INTRAMUSCULAR; INTRAVENOUS
Qty: 0 | Refills: 0 | Status: DISCONTINUED | OUTPATIENT
Start: 2017-11-28 | End: 2017-12-01

## 2017-11-28 RX ORDER — HYDROCORTISONE 20 MG
100 TABLET ORAL EVERY 8 HOURS
Qty: 0 | Refills: 0 | Status: DISCONTINUED | OUTPATIENT
Start: 2017-11-28 | End: 2017-11-28

## 2017-11-28 RX ADMIN — VORICONAZOLE 125 MILLIGRAM(S): 10 INJECTION, POWDER, LYOPHILIZED, FOR SOLUTION INTRAVENOUS at 11:06

## 2017-11-28 RX ADMIN — BUDESONIDE AND FORMOTEROL FUMARATE DIHYDRATE 2 PUFF(S): 160; 4.5 AEROSOL RESPIRATORY (INHALATION) at 21:20

## 2017-11-28 RX ADMIN — ALBUTEROL 2 PUFF(S): 90 AEROSOL, METERED ORAL at 21:20

## 2017-11-28 RX ADMIN — Medication 30 MILLIGRAM(S): at 11:47

## 2017-11-28 RX ADMIN — Medication 325 MILLIGRAM(S): at 17:19

## 2017-11-28 RX ADMIN — PROPOFOL 22.5 MICROGRAM(S)/KG/MIN: 10 INJECTION, EMULSION INTRAVENOUS at 07:35

## 2017-11-28 RX ADMIN — Medication 250 MILLIGRAM(S): at 13:55

## 2017-11-28 RX ADMIN — Medication 250 MILLIGRAM(S): at 06:08

## 2017-11-28 RX ADMIN — ALBUTEROL 2 PUFF(S): 90 AEROSOL, METERED ORAL at 03:34

## 2017-11-28 RX ADMIN — ENOXAPARIN SODIUM 40 MILLIGRAM(S): 100 INJECTION SUBCUTANEOUS at 11:59

## 2017-11-28 RX ADMIN — Medication 250 MILLIGRAM(S): at 22:09

## 2017-11-28 RX ADMIN — ALBUTEROL 2 PUFF(S): 90 AEROSOL, METERED ORAL at 16:23

## 2017-11-28 RX ADMIN — Medication 1 PUFF(S): at 16:23

## 2017-11-28 RX ADMIN — PIPERACILLIN AND TAZOBACTAM 25 GRAM(S): 4; .5 INJECTION, POWDER, LYOPHILIZED, FOR SOLUTION INTRAVENOUS at 06:11

## 2017-11-28 RX ADMIN — Medication 1 PUFF(S): at 21:20

## 2017-11-28 RX ADMIN — POLYETHYLENE GLYCOL 3350 17 GRAM(S): 17 POWDER, FOR SOLUTION ORAL at 22:09

## 2017-11-28 RX ADMIN — Medication 325 MILLIGRAM(S): at 11:47

## 2017-11-28 RX ADMIN — Medication 325 MILLIGRAM(S): at 07:37

## 2017-11-28 RX ADMIN — Medication 1 APPLICATION(S): at 06:09

## 2017-11-28 RX ADMIN — Medication 1 APPLICATION(S): at 17:19

## 2017-11-28 RX ADMIN — FENTANYL CITRATE 15 MICROGRAM(S)/KG/HR: 50 INJECTION INTRAVENOUS at 07:34

## 2017-11-28 RX ADMIN — Medication 1 PUFF(S): at 09:23

## 2017-11-28 RX ADMIN — Medication 500 MILLIGRAM(S): at 06:08

## 2017-11-28 RX ADMIN — ALBUTEROL 2 PUFF(S): 90 AEROSOL, METERED ORAL at 09:22

## 2017-11-28 RX ADMIN — HEPARIN SODIUM 5000 UNIT(S): 5000 INJECTION INTRAVENOUS; SUBCUTANEOUS at 06:09

## 2017-11-28 RX ADMIN — CEFTRIAXONE 100 GRAM(S): 500 INJECTION, POWDER, FOR SOLUTION INTRAMUSCULAR; INTRAVENOUS at 11:06

## 2017-11-28 RX ADMIN — SENNA PLUS 10 MILLILITER(S): 8.6 TABLET ORAL at 22:09

## 2017-11-28 RX ADMIN — BUDESONIDE AND FORMOTEROL FUMARATE DIHYDRATE 2 PUFF(S): 160; 4.5 AEROSOL RESPIRATORY (INHALATION) at 09:22

## 2017-11-28 RX ADMIN — Medication 1 PUFF(S): at 03:34

## 2017-11-28 RX ADMIN — Medication 6 MILLIGRAM(S): at 15:29

## 2017-11-28 RX ADMIN — PROPOFOL 50 MILLIGRAM(S): 10 INJECTION, EMULSION INTRAVENOUS at 15:31

## 2017-11-28 RX ADMIN — CHLORHEXIDINE GLUCONATE 1 APPLICATION(S): 213 SOLUTION TOPICAL at 11:59

## 2017-11-28 RX ADMIN — CISATRACURIUM BESYLATE 20 MILLIGRAM(S): 2 INJECTION INTRAVENOUS at 15:32

## 2017-11-28 RX ADMIN — Medication 1 MILLIGRAM(S): at 11:47

## 2017-11-28 RX ADMIN — Medication 500 MILLIGRAM(S): at 17:19

## 2017-11-28 RX ADMIN — FENTANYL CITRATE 2 MICROGRAM(S)/KG/HR: 50 INJECTION INTRAVENOUS at 00:00

## 2017-11-28 NOTE — PROGRESS NOTE ADULT - PROBLEM SELECTOR PLAN 4
Cont BD with inhaled steroids and oral steroids  11/114: Pts wheezing has significantly improved: he will need long term steroids as he starts wheezing once his steroids are dced!  11/15: Wheezing has increased: started on IV steroids high dose by thoracic team: Would suggest to decrease to 20 mg three times a day !  11/16: decrease steroids today to 20 mg three times day  11/17: try to decrease steroids in AM  11/18 Duoneb. intubated for acute respiratory distress with hypoxia  11/19 KIARA. intubated and on ventilator support  11/20: cont vent support: start weaning as tolerated!  11/21: would decrease  his steroids to 30 mg a day from tomorrow  11/22: DECREASE STEROIDS: HE WHEEZES WHEN THE STEROIDS ARE TAPERED: BUT NEED TO BE LOWERED NOW AND WATCH!!  11/23: on 40 mg of prednisone: try to decrease and taper  11/24: prednisone decreased to 30 ,g a day !!  11/25: on 30 mg a day now:  11/27: on steroids: for repeat ct scan chest today  11/28: ct reviewed with [facility] attending

## 2017-11-28 NOTE — PROCEDURE NOTE - NSPROCDETAILS_GEN_ALL_CORE
dressing applied/percutaneous/Seldinger technique/supine position/secured in place/sterile dressing applied
patient pre-oxygenated, tube inserted, placement confirmed/connected to ventilator
guidewire recovered

## 2017-11-28 NOTE — PROCEDURE NOTE - NSINDICATIONS_GEN_A_CORE
pneumothorax
hemodynamic monitoring/venous access/critical illness
respiratory distress/airway protection/respiratory failure

## 2017-11-28 NOTE — PROGRESS NOTE ADULT - SUBJECTIVE AND OBJECTIVE BOX
Patient is a 47y old  Male who presents with a chief complaint of hemoptysis (09 Nov 2017 00:32)  awake; off sedation yesterdays event snoted     Any change in ROS:     MEDICATIONS  (STANDING):  ALBUTerol    90 MICROgram(s) HFA Inhaler 2 Puff(s) Inhalation every 6 hours  buDESOnide 160 MICROgram(s)/formoterol 4.5 MICROgram(s) Inhaler 2 Puff(s) Inhalation two times a day  cefTRIAXone   IVPB      chlorhexidine 4% Liquid 1 Application(s) Topical daily  enoxaparin Injectable 40 milliGRAM(s) SubCutaneous daily  fentaNYL   Infusion 2 MICROgram(s)/kG/Hr (15 mL/Hr) IV Continuous <Continuous>  fentaNYL   Patch 100 MICROgram(s)/Hr. 1 Patch Transdermal every 48 hours  ferrous    sulfate 325 milliGRAM(s) Oral three times a day with meals  folic acid 1 milliGRAM(s) Oral daily  insulin lispro (HumaLOG) corrective regimen sliding scale   SubCutaneous every 6 hours  ipratropium 17 MICROgram(s) HFA Inhaler 1 Puff(s) Inhalation every 6 hours  petrolatum Ophthalmic Ointment 1 Application(s) Both EYES two times a day  polyethylene glycol 3350 17 Gram(s) Oral at bedtime  predniSONE   Tablet 30 milliGRAM(s) Oral daily  propofol Infusion 50 MICROgram(s)/kG/Min (22.5 mL/Hr) IV Continuous <Continuous>  senna Syrup 10 milliLiter(s) Oral at bedtime  valproic  acid Syrup 500 milliGRAM(s) Oral two times a day  vancomycin  IVPB 1000 milliGRAM(s) IV Intermittent every 8 hours  voriconazole IVPB 300 milliGRAM(s) IV Intermittent every 12 hours    MEDICATIONS  (PRN):  acetaminophen   Tablet 650 milliGRAM(s) Oral every 6 hours PRN For Temp greater than 38 C (100.4 F)  LORazepam   Injectable 1 milliGRAM(s) IV Push every 4 hours PRN Anxiety    Vital Signs Last 24 Hrs  T(C): 36.8 (28 Nov 2017 12:00), Max: 36.8 (27 Nov 2017 16:00)  T(F): 98.2 (28 Nov 2017 12:00), Max: 98.3 (28 Nov 2017 00:00)  HR: 103 (28 Nov 2017 14:00) (77 - 111)  BP: 146/89 (28 Nov 2017 14:00) (86/58 - 146/89)  BP(mean): 102 (28 Nov 2017 14:00) (61 - 102)  RR: 20 (28 Nov 2017 14:00) (12 - 24)  SpO2: 100% (28 Nov 2017 14:00) (96% - 100%)  Mode: AC/ CMV (Assist Control/ Continuous Mandatory Ventilation)  RR (machine): 12  TV (machine): 500  FiO2: 50  PEEP: 10  ITime: 1  MAP: 15  PIP: 35    I&O's Summary    27 Nov 2017 07:01  -  28 Nov 2017 07:00  --------------------------------------------------------  IN: 3348 mL / OUT: 1365 mL / NET: 1983 mL    28 Nov 2017 07:01  -  28 Nov 2017 14:21  --------------------------------------------------------  IN: 1176.7 mL / OUT: 760 mL / NET: 416.7 mL          Physical Exam:   GENERAL: NAD, well-groomed, well-developed  HEENT: GIOVANI/   Atraumatic, Normocephalic  ENMT: No tonsillar erythema, exudates, or enlargement; Moist mucous membranes, Good dentition, No lesions  NECK: Supple, No JVD, Normal thyroid  CHEST/LUNG: Coarse rhonchi  CVS: Regular rate and rhythm; No murmurs, rubs, or gallops  GI: : Soft, Nontender, Nondistended; Bowel sounds present  NERVOUS SYSTEM:  awake  EXTREMITIES:  2+ Peripheral Pulses, No clubbing, cyanosis, or edema  LYMPH: No lymphadenopathy noted  SKIN: No rashes or lesions  ENDOCRINOLOGY: No Thyromegaly  PSYCH: Appropriate    Labs:  ABG - ( 28 Nov 2017 13:09 )  pH: 7.47  /  pCO2: 57    /  pO2: 136   / HCO3: 39    / Base Excess: 15.5  /  SaO2: 99.7                                        7.2    10.80 )-----------( 111      ( 28 Nov 2017 02:50 )             22.8                         7.9    13.03 )-----------( 114      ( 27 Nov 2017 03:46 )             25.1                         7.8    17.22 )-----------( 115      ( 26 Nov 2017 03:10 )             23.7                         8.2    11.11 )-----------( 94       ( 25 Nov 2017 15:30 )             26.1                         7.7    7.02  )-----------( 84       ( 25 Nov 2017 02:30 )             24.7     11-28    142  |  95<L>  |  12  ----------------------------<  144<H>  4.3   |  40<H>  |  0.73  11-27    141  |  95<L>  |  10  ----------------------------<  108<H>  4.8   |  39<H>  |  0.66  11-26    142  |  96<L>  |  9   ----------------------------<  151<H>  4.1   |  38<H>  |  0.72  11-25    136  |  92<L>  |  14  ----------------------------<  92  4.0   |  37<H>  |  0.65    Ca    8.2<L>      28 Nov 2017 02:50  Ca    8.5      27 Nov 2017 03:46  Phos  3.0     11-28  Phos  3.1     11-27  Mg     2.2     11-28  Mg     2.4     11-27    TPro  5.4<L>  /  Alb  2.4<L>  /  TBili  0.2  /  DBili  x   /  AST  12  /  ALT  8   /  AlkPhos  61  11-28  TPro  5.6<L>  /  Alb  2.5<L>  /  TBili  0.3  /  DBili  x   /  AST  15  /  ALT  9   /  AlkPhos  67  11-27  TPro  5.0<L>  /  Alb  2.4<L>  /  TBili  0.5  /  DBili  x   /  AST  12  /  ALT  7   /  AlkPhos  64  11-26  TPro  4.9<L>  /  Alb  2.5<L>  /  TBili  0.2  /  DBili  x   /  AST  12  /  ALT  6   /  AlkPhos  56  11-25    CAPILLARY BLOOD GLUCOSE      POCT Blood Glucose.: 138 mg/dL (28 Nov 2017 12:01)  POCT Blood Glucose.: 172 mg/dL (28 Nov 2017 05:59)  POCT Blood Glucose.: 135 mg/dL (27 Nov 2017 23:35)  POCT Blood Glucose.: 153 mg/dL (27 Nov 2017 16:50)      LIVER FUNCTIONS - ( 28 Nov 2017 02:50 )  Alb: 2.4 g/dL / Pro: 5.4 g/dL / ALK PHOS: 61 u/L / ALT: 8 u/L / AST: 12 u/L / GGT: x               Cultures:     Vancomycin Trough:     11-28 @ 02:50  10.0    Gentamicin:          11-28 @ 02:50  --      Blood Culture:           11-25 @ 15:47  Organism Klebsiella pneumoniae  Gram stain --    Blood Culture:           11-25 @ 12:46  Organism Staphylococcus epidermidis  Gram stain --      Urine Culture:          11-25 @ 15:47  Organism Klebsiella pneumoniae    Urine Culture:          11-25 @ 12:46  Organism Staphylococcus epidermidis      Wound culture:                11-26 @ 22:40  Organism --  Culture w/ gram stain --  Specimen Source BLOOD    Wound culture:                11-25 @ 15:47  Organism Klebsiella pneumoniae  Culture w/ gram stain --  Specimen Source URINE CATHETER    Wound culture:                11-25 @ 12:46  Organism Staphylococcus epidermidis  Culture w/ gram stain --  Specimen Source BLOOD    Wound culture:                11-23 @ 17:05  Organism --  Culture w/ gram stain --  Specimen Source TRACHEAL ASPIRATE      Abscess culture:             11-26 @ 22:40  Organism --  Gram Stain --  Specimen Source BLOOD    Abscess culture:             11-25 @ 15:47  Organism Klebsiella pneumoniae  Gram Stain --  Specimen Source URINE CATHETER    Abscess culture:             11-25 @ 12:46  Organism Staphylococcus epidermidis  Gram Stain --  Specimen Source BLOOD    Abscess culture:             11-23 @ 17:05  Organism --  Gram Stain --  Specimen Source TRACHEAL ASPIRATE      CSF:              11-25 @ 15:47  Organism Klebsiella pneumoniae  Gram Stain --    CSF:              11-25 @ 12:46  Organism Staphylococcus epidermidis  Gram Stain --      Tissue culture:           11-26 @ 22:40  Organism --  Gram Stain --  Specimen Source BLOOD    Tissue culture:           11-25 @ 15:47  Organism Klebsiella pneumoniae  Gram Stain --  Specimen Source URINE CATHETER    Tissue culture:           11-25 @ 12:46  Organism Staphylococcus epidermidis  Gram Stain --  Specimen Source BLOOD    Tissue culture:           11-23 @ 17:05  Organism --  Gram Stain --  Specimen Source TRACHEAL ASPIRATE      Body Fluid Smear & Culture:                        11-26 @ 22:40  AFB Smear  --  Culture Acid Fast Body Fluid w/ Smear  --  Culture Acid Fast Smear Concentrated   --    Culture Results:     --  Specimen Source BLOOD    Body Fluid Smear & Culture:                        11-25 @ 15:47  AFB Smear  --  Culture Acid Fast Body Fluid w/ Smear  --  Culture Acid Fast Smear Concentrated   --    Culture Results:     --  Specimen Source URINE CATHETER    Body Fluid Smear & Culture:                        11-25 @ 12:46  AFB Smear  --  Culture Acid Fast Body Fluid w/ Smear  --  Culture Acid Fast Smear Concentrated   --    Culture Results:     --  Specimen Source BLOOD    Body Fluid Smear & Culture:                        11-23 @ 17:05  AFB Smear  --  Culture Acid Fast Body Fluid w/ Smear  --  Culture Acid Fast Smear Concentrated   --    Culture Results:     --  Specimen Source TRACHEAL ASPIRATE              Studies  Chest X-RAY  CT SCAN Chest   Venous Dopplers: LE:   CT Abdomen  Others              new ct reviewed with thoracic radiologist

## 2017-11-28 NOTE — PROGRESS NOTE ADULT - ASSESSMENT
47M with hx of sarcoidosis, started on Eqliuis for cerebrovascular infarct with PFO on echo and paroxysmal afib in September c/b recurrent hemoptysis (Eliquis d/c'd on 9/20, then restarted between 10/23-27 after hemoptysis improved), d/c'd to rehab then returned 11/9 for recurrent hemoptysis requiring intubation and CTICU admission for management of hemoptysis including IR embolization (11/16) and serial bronch (11/17 and 11/20) in CTICU with no active bleeding visualized, now remains intubated with difficulty weaning vent as during CPAP trials pt fails to initiate breaths, unclear etiology. Transferred to MICU from CTICU for continued vent management and attempted weaning. Bedside trach placed 3 days ago c/b episode of hypoxia and bleeding around trach site after suctioning, resolved after surgicel and pressure. Has been febrile over the past 3 days (no fevers overnight) so repeat cultures drawn, likely UTI vs PNA, started on antibiotics. Also with tachypnea to 50's overnight with blood secretions and had to be restarted on fentanyl.     # Neuro:  -on propofol gtt at 30mcg/kg/min; attempts to wean unsuccessful as becomes anxious.   -Fentanyl gtt for pain control; d/c gtt and try to manage with fentanyl patch, ativan prn anxiety  -Able to follow commands, trying to wean sedation but becomes discordant and anxious off sedation    #Cardiovascular:  -Requiring intermittent pressors, off levophed x >24 hours now  -A-line in place for BP monitoring, remove today in setting of active infection    #Respiratory:  -Trach placed 3 days ago at bedside  -Intermittent episodes of tachypnea, significant this morning  -Bloody secretions from trach that appear old; may need repeat bronch today  -Continue bronchodilators and pulmonary toilet    #GI:  -Restarted feeds yesterday via new KO tube   -Had 3 small bowel movements yesterday     #Renal:  -Monitor I's and O's (net +1L)  -No active issues    #Heme:  -H/H stable; baseline Hb 9-10  -Improving thrombocytopenia vs reactive from infection    #ID:  -Aspergilloma dx 9/24/17, on voriconazole x 6 weeks per ID recommendations. Recommend continuing voriconazole given hemoptysis recurred after was stopped, possible related to inflammation 2/2 aspergilloma and possible continued infection  -Recultured and started on antibiotics yesterday for fevers up to 103F. Likely urinary source, f/u blood/urine cx. Blood cx from 11/25 with Gram positive cocci in clusters, coag negative likely contaminant but restarted on vancomycin and repeat blood cx drawan.     #Endocrine:   -Hypoglycemia 2 days ago, required D5 during the day, KO tube placed and feeds restarted with normal blood glucose levels overnight so D5 stopped yesterday.   -repeat electrolytes PRN    #DVT PPX: HSQ    RONAL Cantu, PGY3  MICU  [id number] 47M with hx of sarcoidosis, started on Eqliuis for cerebrovascular infarct with PFO on echo and paroxysmal afib in September c/b recurrent hemoptysis (Eliquis d/c'd on 9/20, then restarted between 10/23-27 after hemoptysis improved), d/c'd to rehab then returned 11/9 for recurrent hemoptysis requiring intubation and CTICU admission for management of hemoptysis including IR embolization (11/16) and serial bronch (11/17 and 11/20) in CTICU with no active bleeding visualized, now remains intubated with difficulty weaning vent as during CPAP trials pt fails to initiate breaths, unclear etiology. Transferred to MICU from CTICU for continued vent management and attempted weaning. Bedside trach placed 11/24 c/b episode of hypoxia and bleeding around trach site after suctioning, resolved after surgicel and pressure. Has been febrile over the past 3 days(last fever of 100.4 yesterday at 8 am) grew Klebsiella on urine cx.    # Neuro:  -on propofol gtt at 30mcg/kg/min; attempts to wean unsuccessful as becomes anxious.   -Fentanyl gtt/patch for pain control;  ativan prn anxiety  -Able to follow commands, trying to wean sedation but becomes discordant and anxious off sedation    #Cardiovascular:  -Requiring intermittent pressors, off levophed x >24 hours now  -A-line in place for BP monitoring, remove today in setting of active infection    #Respiratory:  -Trach placed 4 days ago at bedside  tachypnea improved in last 24 hours, max RR 24  -repeat CT chest prelim shows 5 X 3.3 cm outpouching of air from L pneumothorax that extends between anterolateral/5th-6th ribs into subcutaneous tissue.   -Bloody secretions from trach that appear old; may need repeat bronch today  -Continue bronchodilators and pulmonary toilet  -pt was becoming hypercapnic, PCO2 of 88, TV  changed from 450 to 500; would consider going down on PEEP, given ABG with pO2 in the 200s, and pt has large DAVID bleb on repeat CT chest     #GI:  -Restarted feeds yesterday via new KO tube   -Had 3 small bowel movements yesterday     #Renal:  -Monitor I's and O's (net ~2L)  -consider fluids given low urine output    #Heme:  -H/H stable; baseline Hb 9-10  -Improving thrombocytopenia vs reactive from infection    #ID:  -Aspergilloma dx 9/24/17, on voriconazole x 6 weeks per ID recommendations. Recommend continuing voriconazole given hemoptysis recurred after was stopped, possible related to inflammation 2/2 aspergilloma and possible continued infection  -Recultured and started on antibiotics yesterday for fevers up to 103F. Likely urinary source, f/u blood/urine cx. Blood cx from 11/25 with Gram positive cocci in clusters, coag negative likely contaminant but restarted on vancomycin and repeat blood cx drawan.     #Endocrine:   -Hypoglycemia 2 days ago, required D5 during the day, KO tube placed and feeds restarted with normal blood glucose levels overnight so D5 stopped yesterday.   -repeat electrolytes PRN    #DVT PPX: HSQ    RONAL Cantu, PGY3  MICU  [id number] 47M with hx of sarcoidosis, started on Eqliuis for cerebrovascular infarct with PFO on echo and paroxysmal afib in September c/b recurrent hemoptysis (Eliquis d/c'd on 9/20, then restarted between 10/23-27 after hemoptysis improved), d/c'd to rehab then returned 11/9 for recurrent hemoptysis requiring intubation and CTICU admission for management of hemoptysis including IR embolization (11/16) and serial bronch (11/17 and 11/20) in CTICU with no active bleeding visualized, now remains intubated with difficulty weaning vent as during CPAP trials pt fails to initiate breaths, unclear etiology. Transferred to MICU from CTICU for continued vent management and attempted weaning. Bedside trach placed 11/24 c/b episode of hypoxia and bleeding around trach site after suctioning, resolved after surgicel and pressure. Has been febrile over the past 3 days(last fever of 100.4 yesterday at 8 am) grew Klebsiella on urine cx.    # Neuro:  -on propofol gtt at 30mcg/kg/min; attempts to wean unsuccessful as becomes anxious.   -Fentanyl gtt/patch for pain control;  ativan prn anxiety  -Able to follow commands, trying to wean sedation but becomes discordant and anxious off sedation    #Cardiovascular:  -Requiring intermittent pressors, off levophed x >24 hours now  -A-line in place for BP monitoring, remove today in setting of active infection    #Respiratory:  -Trach placed 4 days ago at bedside  tachypnea improved in last 24 hours, max RR 24  -repeat CT chest prelim shows 5 X 3.3 cm outpouching of air from L pneumothorax that extends between anterolateral/5th-6th ribs into subcutaneous tissue.   -Bloody secretions from trach that appear old; may need repeat bronch today  -Continue bronchodilators and pulmonary toilet  -pt was becoming hypercapnic, PCO2 of 88, TV  changed from 450 to 500; would consider going down on PEEP, given ABG with pO2 in the 200s, and pt has large DAVID bleb on repeat CT chest     #GI:  -Restarted feeds yesterday via new KO tube   -Had 3 small bowel movements yesterday     #Renal:  -Monitor I's and O's (net ~2L)  -consider fluids given low urine output    #Heme:  -H/H stable; baseline Hb 9-10;   -thrombocytopenia stable    #ID:  -Aspergilloma dx 9/24/17, on voriconazole x 6 weeks per ID recommendations. Recommend continuing voriconazole given hemoptysis recurred after was stopped, possible related to inflammation 2/2 aspergilloma and possible continued infection.  -Recultured and started on antibiotics 11/26 for fevers up to 103F. Last fever yesterday was at 8 am 100.4.   -UA grossly positive, pt grew Klebsiella 100K colonies. Pansensitive. Deescalate zosyn to ceftriaxone. Given pt grew staph epidermis in 1 bottle on 11/25, and blood cx NTD on 11/26, would d/c vanco    #Endocrine:   -On tube feeds, FS q6h, SS,  --172    #DVT PPX: HSQ    Chuck Herrera, PGY3  [id number] 47M with hx of sarcoidosis, started on Eqliuis for cerebrovascular infarct with PFO on echo and paroxysmal afib in September c/b recurrent hemoptysis (Eliquis d/c'd on 9/20, then restarted between 10/23-27 after hemoptysis improved), d/c'd to rehab then returned 11/9 for recurrent hemoptysis requiring intubation and CTICU admission for management of hemoptysis including IR embolization (11/16) and serial bronch (11/17 and 11/20) in CTICU with no active bleeding visualized, now remains intubated with difficulty weaning vent as during CPAP trials pt fails to initiate breaths, unclear etiology. Transferred to MICU from CTICU for continued vent management and attempted weaning. Bedside trach placed 11/24 c/b episode of hypoxia and bleeding around trach site after suctioning, resolved after surgicel and pressure. Has been febrile over the past 3 days(last fever of 100.4 yesterday at 8 am) grew Klebsiella on urine cx.    # Neuro:  -on propofol gtt at 30mcg/kg/min; attempts to wean unsuccessful as becomes anxious.   -Fentanyl gtt/patch for pain control;  ativan prn anxiety  -Able to follow commands, trying to wean sedation but becomes discordant and anxious off sedation    #Cardiovascular:  -Requiring intermittent pressors, off levophed x >24 hours now  -A-line in place for BP monitoring, remove today in setting of active infection    #Respiratory:  -Trach placed 4 days ago at bedside  tachypnea improved in last 24 hours, max RR 24  -repeat CT chest prelim shows 5 X 3.3 cm outpouching of air from L pneumothorax that extends between anterolateral/5th-6th ribs into subcutaneous tissue.   -Bloody secretions from trach that appear old; may need repeat bronch today  -pt was becoming hypercapnic, PCO2 of 88, TV  changed from 450 to 500; would consider going down on PEEP, given ABG with pO2 in the 200s, and pt has large DAVID bleb on repeat CT chest   -Continue bronchodilators and pulmonary toilet  -on predisone 30 mg for sarcoidosis  #GI:  -Restarted feeds yesterday via new KO tube   -Had 3 small bowel movements yesterday     #Renal:  -Monitor I's and O's (net ~2L)  -consider fluids given low urine output    #Heme:  -H/H stable; baseline Hb 9-10;   -thrombocytopenia stable    #ID:  -Aspergilloma dx 9/24/17, on voriconazole x 6 weeks per ID recommendations. Recommend continuing voriconazole given hemoptysis recurred after was stopped, possible related to inflammation 2/2 aspergilloma and possible continued infection.  -Recultured and started on antibiotics 11/26 for fevers up to 103F. Last fever yesterday was at 8 am 100.4.   -UA grossly positive, pt grew Klebsiella 100K colonies. Pansensitive. Deescalate zosyn to ceftriaxone. Given pt grew staph epidermis in 1 bottle on 11/25, and blood cx NTD on 11/26, would d/c vanco    #Endocrine:   -On tube feeds, FS q6h, SS,  --172    #DVT PPX: HSQ    Chuck Herrera, PGY3  [id number] 47M with hx of sarcoidosis, started on Eqliuis for cerebrovascular infarct with PFO on echo and paroxysmal afib in September c/b recurrent hemoptysis (Eliquis d/c'd on 9/20, then restarted between 10/23-27 after hemoptysis improved), d/c'd to rehab then returned 11/9 for recurrent hemoptysis requiring intubation and CTICU admission for management of hemoptysis including IR embolization (11/16) and serial bronch (11/17 and 11/20) in CTICU with no active bleeding visualized, now remains intubated with difficulty weaning vent as during CPAP trials pt fails to initiate breaths, unclear etiology. Transferred to MICU from CTICU for continued vent management and attempted weaning. Bedside trach placed 11/24 c/b episode of hypoxia and bleeding around trach site after suctioning, resolved after surgicel and pressure. Has been febrile over the past 3 days(last fever of 100.4 yesterday at 8 am) grew Klebsiella on urine cx.    # Neuro:  -on propofol gtt at 30mcg/kg/min; attempts to wean unsuccessful as becomes anxious.   -Fentanyl gtt/patch for pain control;  ativan prn anxiety  -Able to follow commands, trying to wean sedation but becomes discordant and anxious off sedation    #Cardiovascular:  -Requiring intermittent pressors, off levophed x >24 hours now  -A-line in place for BP monitoring, remove today in setting of active infection    #Respiratory:  -Trach placed 4 days ago at bedside  tachypnea improved in last 24 hours, max RR 24  -repeat CT chest prelim shows 5 X 3.3 cm outpouching of air from L pneumothorax that extends between anterolateral/5th-6th ribs into subcutaneous tissue.   -Bloody secretions from trach that appear old; may need repeat bronch today  -pt was becoming hypercapnic, PCO2 of 88, TV  changed from 450 to 500; would consider going down on PEEP, given ABG with pO2 in the 200s, and pt has large DAVID bleb on repeat CT chest   -Continue bronchodilators and pulmonary toilet  -on predisone 30 mg for sarcoidosis  #GI:  -Restarted feeds yesterday via new KO tube   -Had 3 small bowel movements yesterday     #Renal:  -Monitor I's and O's (net ~2L)  -consider fluids given low urine output    #Heme:  -H/H stable; baseline Hb 9-10;   -thrombocytopenia stable    #ID:  -Aspergilloma dx 9/24/17, on voriconazole x 6 weeks per ID recommendations. Recommend continuing voriconazole given hemoptysis recurred after was stopped, possible related to inflammation 2/2 aspergilloma and possible continued infection.  -Recultured and started on antibiotics 11/26 for fevers up to 103F. Last fever yesterday was at 8 am 100.4.   -UA grossly positive, pt grew Klebsiella 100K colonies. Pansensitive. Deescalate zosyn to ceftriaxone.   -Given pt consistently grows staph epi, will c/w vanco    #Endocrine:   -On tube feeds, FS q6h, SS,  --172    #DVT PPX: loveellenx    Chuck Herrera, PGY3  [id number]

## 2017-11-28 NOTE — PROGRESS NOTE ADULT - PROBLEM SELECTOR PLAN 1
now spiked to fever:  on zosyn, vancomycin, as well as voriconazole~  id FOLLOW UP  11/27: Cont antibiotics::  11/28: cont current treatment

## 2017-11-28 NOTE — CHART NOTE - NSCHARTNOTEFT_GEN_A_CORE
Indication: possible pneumothorax    Operators: Terry Savage Koenig    Pre-op Dx: Pneumothorax    History:  47M hx sarcoidosis, ?COPD, p/w hemoptysis. 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 Hospital course complicated by stroke, pAfib, PFO and CVA and placed on eliquis. Pt discharged to rehab on 11/1. Returned to hospital on 11/8 for hemoptysis. Went to OR on 11/15 for bronch because of hemoptysis. IR embolization of left bronchial artery. Has remained intubated since 11/9. s/p trach 11/    Preop medications:  - Nimbex 20 mg IVP  - Ativan 6 mg IVP  - Propofol 50 mg IVP    Xray/CT Findings:  CT Chest with large pneumothorax herniating through rib space on left    Findings:  Bronchoscope inserted through trach. ETT noted to be in good position. Airway evaluation revealed air from DAVID anterior seg, suggestive of pneumothorax.  Bronchoscope then withdrawn from ETT.    Specimens: None      ----------------------------------------  Emilee Savage MD PGY-4  Pulmonary/Critical Care Fellow  Pager # [phone] (NS), [phone] (LIANDREA)

## 2017-11-28 NOTE — PROGRESS NOTE ADULT - SUBJECTIVE AND OBJECTIVE BOX
INTERVAL HPI/OVERNIGHT EVENTS:    SUBJECTIVE: Patient seen and examined at bedside.     CONSTITUTIONAL: No weakness, fevers or chills  EYES/ENT: No visual changes;  No vertigo or throat pain   NECK: No pain or stiffness  RESPIRATORY: No cough, wheezing, hemoptysis; No shortness of breath  CARDIOVASCULAR: No chest pain or palpitations  GASTROINTESTINAL: No abdominal or epigastric pain. No nausea, vomiting, or hematemesis; No diarrhea or constipation. No melena or hematochezia.  GENITOURINARY: No dysuria, frequency or hematuria  NEUROLOGICAL: No numbness or weakness  SKIN: No itching, rashes    OBJECTIVE:    VITAL SIGNS:  ICU Vital Signs Last 24 Hrs  T(C): 36.8 (28 Nov 2017 04:00), Max: 38 (27 Nov 2017 08:00)  T(F): 98.3 (28 Nov 2017 04:00), Max: 100.4 (27 Nov 2017 08:00)  HR: 77 (28 Nov 2017 06:00) (77 - 119)  BP: 116/76 (28 Nov 2017 06:00) (74/50 - 137/86)  BP(mean): 85 (28 Nov 2017 06:00) (56 - 98)  ABP: --  ABP(mean): --  RR: 12 (28 Nov 2017 06:00) (12 - 24)  SpO2: 99% (28 Nov 2017 06:00) (96% - 100%)    Mode: AC/ CMV (Assist Control/ Continuous Mandatory Ventilation), RR (machine): 12, TV (machine): 500, FiO2: 60, PEEP: 10, ITime: 1, MAP: 16, PIP: 43    11-27 @ 07:01  -  11-28 @ 07:00  --------------------------------------------------------  IN: 3348 mL / OUT: 1365 mL / NET: 1983 mL      CAPILLARY BLOOD GLUCOSE      POCT Blood Glucose.: 172 mg/dL (28 Nov 2017 05:59)      PHYSICAL EXAM:    General: NAD  HEENT: NC/AT; PERRL, clear conjunctiva  Neck: supple  Respiratory: CTA b/l  Cardiovascular: +S1/S2; RRR  Abdomen: soft, NT/ND; +BS x4  Extremities: WWP, 2+ peripheral pulses b/l; no LE edema  Skin: normal color and turgor; no rash  Neurological:    MEDICATIONS:  MEDICATIONS  (STANDING):  ALBUTerol    90 MICROgram(s) HFA Inhaler 2 Puff(s) Inhalation every 6 hours  buDESOnide 160 MICROgram(s)/formoterol 4.5 MICROgram(s) Inhaler 2 Puff(s) Inhalation two times a day  chlorhexidine 4% Liquid 1 Application(s) Topical daily  fentaNYL   Infusion 2 MICROgram(s)/kG/Hr (15 mL/Hr) IV Continuous <Continuous>  fentaNYL   Patch 100 MICROgram(s)/Hr. 1 Patch Transdermal every 48 hours  ferrous    sulfate 325 milliGRAM(s) Oral three times a day with meals  folic acid 1 milliGRAM(s) Oral daily  heparin  Injectable 5000 Unit(s) SubCutaneous every 8 hours  insulin lispro (HumaLOG) corrective regimen sliding scale   SubCutaneous every 6 hours  ipratropium 17 MICROgram(s) HFA Inhaler 1 Puff(s) Inhalation every 6 hours  petrolatum Ophthalmic Ointment 1 Application(s) Both EYES two times a day  piperacillin/tazobactam IVPB. 3.375 Gram(s) IV Intermittent every 8 hours  polyethylene glycol 3350 17 Gram(s) Oral at bedtime  predniSONE   Tablet 30 milliGRAM(s) Oral daily  propofol Infusion 50 MICROgram(s)/kG/Min (22.5 mL/Hr) IV Continuous <Continuous>  senna Syrup 10 milliLiter(s) Oral at bedtime  valproic  acid Syrup 500 milliGRAM(s) Oral two times a day  vancomycin  IVPB 1000 milliGRAM(s) IV Intermittent every 12 hours  voriconazole IVPB 300 milliGRAM(s) IV Intermittent every 12 hours    MEDICATIONS  (PRN):  acetaminophen   Tablet 650 milliGRAM(s) Oral every 6 hours PRN For Temp greater than 38 C (100.4 F)  LORazepam   Injectable 1 milliGRAM(s) IV Push every 4 hours PRN Anxiety      ALLERGIES:  Allergies    No Known Allergies    Intolerances        LABS:                        7.2    10.80 )-----------( 111      ( 28 Nov 2017 02:50 )             22.8     11-28    142  |  95<L>  |  12  ----------------------------<  144<H>  4.3   |  40<H>  |  0.73    Ca    8.2<L>      28 Nov 2017 02:50  Phos  3.0     11-28  Mg     2.2     11-28    TPro  5.4<L>  /  Alb  2.4<L>  /  TBili  0.2  /  DBili  x   /  AST  12  /  ALT  8   /  AlkPhos  61  11-28          RADIOLOGY & ADDITIONAL TESTS: Reviewed. INTERVAL HPI/OVERNIGHT EVENTS:  No ON event. Pt denies pain.   SUBJECTIVE: Patient seen and examined at bedside.     CONSTITUTIONAL: No weakness, fevers or chills  EYES/ENT: No visual changes;  No vertigo or throat pain   NECK: No pain or stiffness  RESPIRATORY: No cough, wheezing, hemoptysis; No shortness of breath  CARDIOVASCULAR: No chest pain or palpitations  GASTROINTESTINAL: No abdominal or epigastric pain. No nausea, vomiting, or hematemesis; No diarrhea or constipation. No melena or hematochezia.  GENITOURINARY: No dysuria, frequency or hematuria  NEUROLOGICAL: No numbness or weakness  SKIN: No itching, rashes    OBJECTIVE:    VITAL SIGNS:  ICU Vital Signs Last 24 Hrs  T(C): 36.8 (28 Nov 2017 04:00), Max: 38 (27 Nov 2017 08:00)  T(F): 98.3 (28 Nov 2017 04:00), Max: 100.4 (27 Nov 2017 08:00)  HR: 77 (28 Nov 2017 06:00) (77 - 119)  BP: 116/76 (28 Nov 2017 06:00) (74/50 - 137/86)  BP(mean): 85 (28 Nov 2017 06:00) (56 - 98)  ABP: --  ABP(mean): --  RR: 12 (28 Nov 2017 06:00) (12 - 24)  SpO2: 99% (28 Nov 2017 06:00) (96% - 100%)    Mode: AC/ CMV (Assist Control/ Continuous Mandatory Ventilation), RR (machine): 12, TV (machine): 500, FiO2: 60, PEEP: 10, ITime: 1, MAP: 16, PIP: 43    11-27 @ 07:01  -  11-28 @ 07:00  --------------------------------------------------------  IN: 3348 mL / OUT: 1365 mL / NET: 1983 mL      CAPILLARY BLOOD GLUCOSE      POCT Blood Glucose.: 172 mg/dL (28 Nov 2017 05:59)      PHYSICAL EXAM:    General: NAD  HEENT: NC/AT; PERRL, clear conjunctiva  Neck: supple  Respiratory: CTA b/l  Cardiovascular: +S1/S2; RRR  Abdomen: soft, NT/ND; +BS x4  Extremities: WWP, 2+ peripheral pulses b/l; no LE edema  Skin: normal color and turgor; no rash  Neurological:    MEDICATIONS:  MEDICATIONS  (STANDING):  ALBUTerol    90 MICROgram(s) HFA Inhaler 2 Puff(s) Inhalation every 6 hours  buDESOnide 160 MICROgram(s)/formoterol 4.5 MICROgram(s) Inhaler 2 Puff(s) Inhalation two times a day  chlorhexidine 4% Liquid 1 Application(s) Topical daily  fentaNYL   Infusion 2 MICROgram(s)/kG/Hr (15 mL/Hr) IV Continuous <Continuous>  fentaNYL   Patch 100 MICROgram(s)/Hr. 1 Patch Transdermal every 48 hours  ferrous    sulfate 325 milliGRAM(s) Oral three times a day with meals  folic acid 1 milliGRAM(s) Oral daily  heparin  Injectable 5000 Unit(s) SubCutaneous every 8 hours  insulin lispro (HumaLOG) corrective regimen sliding scale   SubCutaneous every 6 hours  ipratropium 17 MICROgram(s) HFA Inhaler 1 Puff(s) Inhalation every 6 hours  petrolatum Ophthalmic Ointment 1 Application(s) Both EYES two times a day  piperacillin/tazobactam IVPB. 3.375 Gram(s) IV Intermittent every 8 hours  polyethylene glycol 3350 17 Gram(s) Oral at bedtime  predniSONE   Tablet 30 milliGRAM(s) Oral daily  propofol Infusion 50 MICROgram(s)/kG/Min (22.5 mL/Hr) IV Continuous <Continuous>  senna Syrup 10 milliLiter(s) Oral at bedtime  valproic  acid Syrup 500 milliGRAM(s) Oral two times a day  vancomycin  IVPB 1000 milliGRAM(s) IV Intermittent every 12 hours  voriconazole IVPB 300 milliGRAM(s) IV Intermittent every 12 hours    MEDICATIONS  (PRN):  acetaminophen   Tablet 650 milliGRAM(s) Oral every 6 hours PRN For Temp greater than 38 C (100.4 F)  LORazepam   Injectable 1 milliGRAM(s) IV Push every 4 hours PRN Anxiety      ALLERGIES:  Allergies    No Known Allergies    Intolerances        LABS:                        7.2    10.80 )-----------( 111      ( 28 Nov 2017 02:50 )             22.8     11-28    142  |  95<L>  |  12  ----------------------------<  144<H>  4.3   |  40<H>  |  0.73    Ca    8.2<L>      28 Nov 2017 02:50  Phos  3.0     11-28  Mg     2.2     11-28    TPro  5.4<L>  /  Alb  2.4<L>  /  TBili  0.2  /  DBili  x   /  AST  12  /  ALT  8   /  AlkPhos  61  11-28          RADIOLOGY & ADDITIONAL TESTS: Reviewed. INTERVAL HPI/OVERNIGHT EVENTS:  No ON event. Pt denies pain.   SUBJECTIVE: Patient seen and examined at bedside.     CONSTITUTIONAL: No weakness, fevers or chills  EYES/ENT: No visual changes;  No vertigo or throat pain   NECK: No pain or stiffness  RESPIRATORY: No cough, wheezing, hemoptysis; No shortness of breath  CARDIOVASCULAR: No chest pain or palpitations  GASTROINTESTINAL: No abdominal or epigastric pain. No nausea, vomiting, or hematemesis; No diarrhea or constipation. No melena or hematochezia.  GENITOURINARY: No dysuria, frequency or hematuria  NEUROLOGICAL: No numbness or weakness  SKIN: No itching, rashes    OBJECTIVE:    VITAL SIGNS:  ICU Vital Signs Last 24 Hrs  T(C): 36.8 (28 Nov 2017 04:00), Max: 38 (27 Nov 2017 08:00)  T(F): 98.3 (28 Nov 2017 04:00), Max: 100.4 (27 Nov 2017 08:00)  HR: 77 (28 Nov 2017 06:00) (77 - 119)  BP: 116/76 (28 Nov 2017 06:00) (74/50 - 137/86)  BP(mean): 85 (28 Nov 2017 06:00) (56 - 98)  ABP: --  ABP(mean): --  RR: 12 (28 Nov 2017 06:00) (12 - 24)  SpO2: 99% (28 Nov 2017 06:00) (96% - 100%)    Mode: AC/ CMV (Assist Control/ Continuous Mandatory Ventilation), RR (machine): 12, TV (machine): 500, FiO2: 60, PEEP: 10, ITime: 1, MAP: 16, PIP: 43    11-27 @ 07:01  -  11-28 @ 07:00  --------------------------------------------------------  IN: 3348 mL / OUT: 1365 mL / NET: 1983 mL      CAPILLARY BLOOD GLUCOSE      POCT Blood Glucose.: 172 mg/dL (28 Nov 2017 05:59)      PHYSICAL EXAM:    General: NAD  HEENT: clear conjunctiva  Neck: supple  Respiratory: expiratory wheezing  Cardiovascular: +S1/S2; RRR  Abdomen: soft, NT/ND; +BS x4  Extremities: WWP, 2+ peripheral pulses b/l; no LE edema  Skin: normal color and turgor; no rash  Neurological:  follows simple commmands    MEDICATIONS:  MEDICATIONS  (STANDING):  ALBUTerol    90 MICROgram(s) HFA Inhaler 2 Puff(s) Inhalation every 6 hours  buDESOnide 160 MICROgram(s)/formoterol 4.5 MICROgram(s) Inhaler 2 Puff(s) Inhalation two times a day  chlorhexidine 4% Liquid 1 Application(s) Topical daily  fentaNYL   Infusion 2 MICROgram(s)/kG/Hr (15 mL/Hr) IV Continuous <Continuous>  fentaNYL   Patch 100 MICROgram(s)/Hr. 1 Patch Transdermal every 48 hours  ferrous    sulfate 325 milliGRAM(s) Oral three times a day with meals  folic acid 1 milliGRAM(s) Oral daily  heparin  Injectable 5000 Unit(s) SubCutaneous every 8 hours  insulin lispro (HumaLOG) corrective regimen sliding scale   SubCutaneous every 6 hours  ipratropium 17 MICROgram(s) HFA Inhaler 1 Puff(s) Inhalation every 6 hours  petrolatum Ophthalmic Ointment 1 Application(s) Both EYES two times a day  piperacillin/tazobactam IVPB. 3.375 Gram(s) IV Intermittent every 8 hours  polyethylene glycol 3350 17 Gram(s) Oral at bedtime  predniSONE   Tablet 30 milliGRAM(s) Oral daily  propofol Infusion 50 MICROgram(s)/kG/Min (22.5 mL/Hr) IV Continuous <Continuous>  senna Syrup 10 milliLiter(s) Oral at bedtime  valproic  acid Syrup 500 milliGRAM(s) Oral two times a day  vancomycin  IVPB 1000 milliGRAM(s) IV Intermittent every 12 hours  voriconazole IVPB 300 milliGRAM(s) IV Intermittent every 12 hours    MEDICATIONS  (PRN):  acetaminophen   Tablet 650 milliGRAM(s) Oral every 6 hours PRN For Temp greater than 38 C (100.4 F)  LORazepam   Injectable 1 milliGRAM(s) IV Push every 4 hours PRN Anxiety      ALLERGIES:  Allergies    No Known Allergies    Intolerances        LABS:                        7.2    10.80 )-----------( 111      ( 28 Nov 2017 02:50 )             22.8     11-28    142  |  95<L>  |  12  ----------------------------<  144<H>  4.3   |  40<H>  |  0.73    Ca    8.2<L>      28 Nov 2017 02:50  Phos  3.0     11-28  Mg     2.2     11-28    TPro  5.4<L>  /  Alb  2.4<L>  /  TBili  0.2  /  DBili  x   /  AST  12  /  ALT  8   /  AlkPhos  61  11-28    Urine cx: Klebsiella 100K colonies 11/25; staph epidermis 11/25    ABG:   ABG 7.43  pCO2: 64  pO2: 215  HCO3: 40          RADIOLOGY & ADDITIONAL TESTS: Reviewed. INTERVAL HPI/OVERNIGHT EVENTS:  No ON event. Pt denies pain.   SUBJECTIVE: Patient seen and examined at bedside.     CONSTITUTIONAL: No weakness, fevers or chills  EYES/ENT: No visual changes;  No vertigo or throat pain   NECK: No pain or stiffness  RESPIRATORY: No cough, wheezing, hemoptysis; No shortness of breath  CARDIOVASCULAR: No chest pain or palpitations  GASTROINTESTINAL: No abdominal or epigastric pain. No nausea, vomiting, or hematemesis; No diarrhea or constipation. No melena or hematochezia.  GENITOURINARY: No dysuria, frequency or hematuria  NEUROLOGICAL: No numbness or weakness  SKIN: No itching, rashes    OBJECTIVE:    VITAL SIGNS:  ICU Vital Signs Last 24 Hrs  T(C): 36.8 (28 Nov 2017 04:00), Max: 38 (27 Nov 2017 08:00)  T(F): 98.3 (28 Nov 2017 04:00), Max: 100.4 (27 Nov 2017 08:00)  HR: 77 (28 Nov 2017 06:00) (77 - 119)  BP: 116/76 (28 Nov 2017 06:00) (74/50 - 137/86)  BP(mean): 85 (28 Nov 2017 06:00) (56 - 98)  ABP: --  ABP(mean): --  RR: 12 (28 Nov 2017 06:00) (12 - 24)  SpO2: 99% (28 Nov 2017 06:00) (96% - 100%)    Mode: AC/ CMV (Assist Control/ Continuous Mandatory Ventilation), RR (machine): 12, TV (machine): 500, FiO2: 60, PEEP: 10, ITime: 1, MAP: 16, PIP: 43    11-27 @ 07:01  -  11-28 @ 07:00  --------------------------------------------------------  IN: 3348 mL / OUT: 1365 mL / NET: 1983 mL      CAPILLARY BLOOD GLUCOSE      POCT Blood Glucose.: 172 mg/dL (28 Nov 2017 05:59)      PHYSICAL EXAM:    General: NAD  HEENT: clear conjunctiva  Neck: supple  Respiratory: expiratory wheezing  Cardiovascular: +S1/S2; RRR  Abdomen: soft, NT/ND; +BS x4  Extremities: WWP, 2+ peripheral pulses b/l; no LE edema  Skin: normal color and turgor; no rash  Neurological:  follows simple commmands    MEDICATIONS:  MEDICATIONS  (STANDING):  ALBUTerol    90 MICROgram(s) HFA Inhaler 2 Puff(s) Inhalation every 6 hours  buDESOnide 160 MICROgram(s)/formoterol 4.5 MICROgram(s) Inhaler 2 Puff(s) Inhalation two times a day  chlorhexidine 4% Liquid 1 Application(s) Topical daily  fentaNYL   Infusion 2 MICROgram(s)/kG/Hr (15 mL/Hr) IV Continuous <Continuous>  fentaNYL   Patch 100 MICROgram(s)/Hr. 1 Patch Transdermal every 48 hours  ferrous    sulfate 325 milliGRAM(s) Oral three times a day with meals  folic acid 1 milliGRAM(s) Oral daily  heparin  Injectable 5000 Unit(s) SubCutaneous every 8 hours  insulin lispro (HumaLOG) corrective regimen sliding scale   SubCutaneous every 6 hours  ipratropium 17 MICROgram(s) HFA Inhaler 1 Puff(s) Inhalation every 6 hours  petrolatum Ophthalmic Ointment 1 Application(s) Both EYES two times a day  piperacillin/tazobactam IVPB. 3.375 Gram(s) IV Intermittent every 8 hours  polyethylene glycol 3350 17 Gram(s) Oral at bedtime  predniSONE   Tablet 30 milliGRAM(s) Oral daily  propofol Infusion 50 MICROgram(s)/kG/Min (22.5 mL/Hr) IV Continuous <Continuous>  senna Syrup 10 milliLiter(s) Oral at bedtime  valproic  acid Syrup 500 milliGRAM(s) Oral two times a day  vancomycin  IVPB 1000 milliGRAM(s) IV Intermittent every 12 hours  voriconazole IVPB 300 milliGRAM(s) IV Intermittent every 12 hours    MEDICATIONS  (PRN):  acetaminophen   Tablet 650 milliGRAM(s) Oral every 6 hours PRN For Temp greater than 38 C (100.4 F)  LORazepam   Injectable 1 milliGRAM(s) IV Push every 4 hours PRN Anxiety      ALLERGIES:  Allergies    No Known Allergies    Intolerances        LABS:                        7.2    10.80 )-----------( 111      ( 28 Nov 2017 02:50 )             22.8     11-28    142  |  95<L>  |  12  ----------------------------<  144<H>  4.3   |  40<H>  |  0.73    Ca    8.2<L>      28 Nov 2017 02:50  Phos  3.0     11-28  Mg     2.2     11-28    TPro  5.4<L>  /  Alb  2.4<L>  /  TBili  0.2  /  DBili  x   /  AST  12  /  ALT  8   /  AlkPhos  61  11-28    Urine cx: Klebsiella 100K colonies 11/25; staph epidermis 11/25 11/28  ABG:   ABG 7.43  pCO2: 64  pO2: 215  HCO3: 40          RADIOLOGY & ADDITIONAL TESTS: Reviewed.

## 2017-11-28 NOTE — PROGRESS NOTE ADULT - ATTENDING COMMENTS
pt doing poorly  paralyzed, sedated but not requiring high FIO2 :   no bleeding noted:  try to wean sedation as well as paralyzing agent   Steroids have been decreased: goal would be rapidly tapered down steroids to minimum possible given h e is on sedation and paralyzing agent: But per reports pt gets very agitated when taken of paralyzing agent!    11/20: off paralyzing agent; cont current care: may decrease steroids to 30 mg in a day or two    11/21: off sedation : alert and awake and responds to simple commands; weaning trials!!  11/28: ct reviewed: lung herniation noted: for possible chest tube today:   11/23: continues to be on ventilator: failed weaning attempts multiple times: for trach now: prognosis guarded!!  11/24: stable" but critical!!  11/25: NEW FEVER: PANCULTURE AS WELL AS EMPIRIC ANTIBIOTICS:

## 2017-11-28 NOTE — PROCEDURE NOTE - NSSITEPREP_SKIN_A_CORE
Adherence to aseptic technique: hand hygiene prior to donning barriers (gown, gloves), don cap and mask, sterile drape over patient/chlorhexidine
chlorhexidine
not indicated

## 2017-11-28 NOTE — PROGRESS NOTE ADULT - ATTENDING COMMENTS
Really interesting case/seems as though lung moved into subcut area decreasing pressures a s he is easier to ventilate, question whether this is a PTX vs giant bleb/guarded

## 2017-11-28 NOTE — PROCEDURE NOTE - NSCOMPLICATION_GEN_A_CORE
no complications

## 2017-11-28 NOTE — PROGRESS NOTE ADULT - PROBLEM SELECTOR PLAN 2
intubated: try to titrate as tolerated!  11/18 on ventilator support. Pt is sedated and paralyzed. defer ventilator Management/ weaning per CTICU team.  11/19 still sedated and paralyzed. Plan to wean off Nimbex today per Primary RN. ABGs reviewed.  11/20:ff paralyzing agent! But sedated !!  11/21: awake and alert: responding to simple commands: weaning trials  11/22: currently sedated: ? trach now?  11/23: for trach in AM  11/24: trach today  11/25: S/P trach yesterday: chest x-ray noted: Cont full vent support and wean if tolerates  11/26: [id number] with trach and full mechanical ventilation: Discussed with MICU attending in detail: for rpt ct scan chest  11/28: rpt ct scan c hst reviewed: for possible chest tube on rt side

## 2017-11-29 LAB
BACTERIA BLD CULT: SIGNIFICANT CHANGE UP
BASE EXCESS BLDA CALC-SCNC: 17.1 MMOL/L — SIGNIFICANT CHANGE UP
BUN SERPL-MCNC: 12 MG/DL — SIGNIFICANT CHANGE UP (ref 7–23)
CALCIUM SERPL-MCNC: 8 MG/DL — LOW (ref 8.4–10.5)
CHLORIDE BLDA-SCNC: 91 MMOL/L — LOW (ref 96–108)
CHLORIDE SERPL-SCNC: 94 MMOL/L — LOW (ref 98–107)
CO2 SERPL-SCNC: 38 MMOL/L — HIGH (ref 22–31)
CREAT SERPL-MCNC: 0.57 MG/DL — SIGNIFICANT CHANGE UP (ref 0.5–1.3)
GLUCOSE BLDA-MCNC: 136 MG/DL — HIGH (ref 70–99)
GLUCOSE BLDC GLUCOMTR-MCNC: 104 MG/DL — HIGH (ref 70–99)
GLUCOSE BLDC GLUCOMTR-MCNC: 142 MG/DL — HIGH (ref 70–99)
GLUCOSE BLDC GLUCOMTR-MCNC: 92 MG/DL — SIGNIFICANT CHANGE UP (ref 70–99)
GLUCOSE SERPL-MCNC: 138 MG/DL — HIGH (ref 70–99)
HCO3 BLDA-SCNC: 41 MMOL/L — HIGH (ref 22–26)
HCT VFR BLD CALC: 22.3 % — LOW (ref 39–50)
HCT VFR BLDA CALC: 22.1 % — LOW (ref 39–51)
HGB BLD-MCNC: 7.2 G/DL — LOW (ref 13–17)
HGB BLDA-MCNC: 7.1 G/DL — LOW (ref 13–17)
LACTATE BLDA-SCNC: 1.1 MMOL/L — SIGNIFICANT CHANGE UP (ref 0.5–2)
MAGNESIUM SERPL-MCNC: 2.2 MG/DL — SIGNIFICANT CHANGE UP (ref 1.6–2.6)
MCHC RBC-ENTMCNC: 30.8 PG — SIGNIFICANT CHANGE UP (ref 27–34)
MCHC RBC-ENTMCNC: 32.3 % — SIGNIFICANT CHANGE UP (ref 32–36)
MCV RBC AUTO: 95.3 FL — SIGNIFICANT CHANGE UP (ref 80–100)
PCO2 BLDA: 66 MMHG — HIGH (ref 35–48)
PH BLDA: 7.43 PH — SIGNIFICANT CHANGE UP (ref 7.35–7.45)
PHOSPHATE SERPL-MCNC: 2.4 MG/DL — LOW (ref 2.5–4.5)
PLATELET # BLD AUTO: 137 K/UL — LOW (ref 150–400)
PO2 BLDA: 155 MMHG — HIGH (ref 83–108)
POTASSIUM BLDA-SCNC: 3.7 MMOL/L — SIGNIFICANT CHANGE UP (ref 3.4–4.5)
POTASSIUM SERPL-MCNC: 4.1 MMOL/L — SIGNIFICANT CHANGE UP (ref 3.5–5.3)
POTASSIUM SERPL-SCNC: 4.1 MMOL/L — SIGNIFICANT CHANGE UP (ref 3.5–5.3)
RBC # BLD: 2.34 M/UL — LOW (ref 4.2–5.8)
RBC # FLD: 15.7 % — HIGH (ref 10.3–14.5)
SAO2 % BLDA: 99.6 % — HIGH (ref 95–99)
SODIUM BLDA-SCNC: 136 MMOL/L — SIGNIFICANT CHANGE UP (ref 136–146)
SODIUM SERPL-SCNC: 139 MMOL/L — SIGNIFICANT CHANGE UP (ref 135–145)
VANCOMYCIN TROUGH SERPL-MCNC: 16.3 UG/ML — SIGNIFICANT CHANGE UP (ref 10–20)
WBC # BLD: 9.43 K/UL — SIGNIFICANT CHANGE UP (ref 3.8–10.5)
WBC # FLD AUTO: 9.43 K/UL — SIGNIFICANT CHANGE UP (ref 3.8–10.5)

## 2017-11-29 PROCEDURE — 99291 CRITICAL CARE FIRST HOUR: CPT | Mod: 25

## 2017-11-29 PROCEDURE — 71010: CPT | Mod: 26

## 2017-11-29 PROCEDURE — 76604 US EXAM CHEST: CPT | Mod: 26

## 2017-11-29 RX ADMIN — FENTANYL CITRATE 1 PATCH: 50 INJECTION INTRAVENOUS at 04:44

## 2017-11-29 RX ADMIN — Medication 1 MILLIGRAM(S): at 11:39

## 2017-11-29 RX ADMIN — PROPOFOL 22.5 MICROGRAM(S)/KG/MIN: 10 INJECTION, EMULSION INTRAVENOUS at 07:34

## 2017-11-29 RX ADMIN — Medication 30 MILLIGRAM(S): at 11:39

## 2017-11-29 RX ADMIN — CEFTRIAXONE 100 GRAM(S): 500 INJECTION, POWDER, FOR SOLUTION INTRAMUSCULAR; INTRAVENOUS at 10:01

## 2017-11-29 RX ADMIN — VORICONAZOLE 125 MILLIGRAM(S): 10 INJECTION, POWDER, LYOPHILIZED, FOR SOLUTION INTRAVENOUS at 00:06

## 2017-11-29 RX ADMIN — Medication 1 PUFF(S): at 22:26

## 2017-11-29 RX ADMIN — PROPOFOL 22.5 MICROGRAM(S)/KG/MIN: 10 INJECTION, EMULSION INTRAVENOUS at 06:20

## 2017-11-29 RX ADMIN — ALBUTEROL 2 PUFF(S): 90 AEROSOL, METERED ORAL at 03:36

## 2017-11-29 RX ADMIN — FENTANYL CITRATE 1 PATCH: 50 INJECTION INTRAVENOUS at 11:39

## 2017-11-29 RX ADMIN — ALBUTEROL 2 PUFF(S): 90 AEROSOL, METERED ORAL at 17:10

## 2017-11-29 RX ADMIN — PROPOFOL 22.5 MICROGRAM(S)/KG/MIN: 10 INJECTION, EMULSION INTRAVENOUS at 11:39

## 2017-11-29 RX ADMIN — Medication 1 APPLICATION(S): at 05:06

## 2017-11-29 RX ADMIN — Medication 325 MILLIGRAM(S): at 17:12

## 2017-11-29 RX ADMIN — FENTANYL CITRATE 15 MICROGRAM(S)/KG/HR: 50 INJECTION INTRAVENOUS at 04:44

## 2017-11-29 RX ADMIN — BUDESONIDE AND FORMOTEROL FUMARATE DIHYDRATE 2 PUFF(S): 160; 4.5 AEROSOL RESPIRATORY (INHALATION) at 09:22

## 2017-11-29 RX ADMIN — Medication 250 MILLIGRAM(S): at 14:01

## 2017-11-29 RX ADMIN — ENOXAPARIN SODIUM 40 MILLIGRAM(S): 100 INJECTION SUBCUTANEOUS at 11:39

## 2017-11-29 RX ADMIN — FENTANYL CITRATE 15 MICROGRAM(S)/KG/HR: 50 INJECTION INTRAVENOUS at 07:34

## 2017-11-29 RX ADMIN — PROPOFOL 22.5 MICROGRAM(S)/KG/MIN: 10 INJECTION, EMULSION INTRAVENOUS at 00:06

## 2017-11-29 RX ADMIN — BUDESONIDE AND FORMOTEROL FUMARATE DIHYDRATE 2 PUFF(S): 160; 4.5 AEROSOL RESPIRATORY (INHALATION) at 22:26

## 2017-11-29 RX ADMIN — Medication 250 MILLIGRAM(S): at 05:05

## 2017-11-29 RX ADMIN — CHLORHEXIDINE GLUCONATE 1 APPLICATION(S): 213 SOLUTION TOPICAL at 11:39

## 2017-11-29 RX ADMIN — Medication 500 MILLIGRAM(S): at 05:06

## 2017-11-29 RX ADMIN — VORICONAZOLE 125 MILLIGRAM(S): 10 INJECTION, POWDER, LYOPHILIZED, FOR SOLUTION INTRAVENOUS at 11:38

## 2017-11-29 RX ADMIN — Medication 1 PUFF(S): at 09:22

## 2017-11-29 RX ADMIN — Medication 1 PUFF(S): at 17:10

## 2017-11-29 RX ADMIN — Medication 325 MILLIGRAM(S): at 11:39

## 2017-11-29 RX ADMIN — ALBUTEROL 2 PUFF(S): 90 AEROSOL, METERED ORAL at 09:23

## 2017-11-29 RX ADMIN — PROPOFOL 22.5 MICROGRAM(S)/KG/MIN: 10 INJECTION, EMULSION INTRAVENOUS at 21:45

## 2017-11-29 RX ADMIN — Medication 650 MILLIGRAM(S): at 12:17

## 2017-11-29 RX ADMIN — Medication 1 PUFF(S): at 03:36

## 2017-11-29 RX ADMIN — Medication 325 MILLIGRAM(S): at 07:34

## 2017-11-29 RX ADMIN — Medication 250 MILLIGRAM(S): at 21:45

## 2017-11-29 RX ADMIN — ALBUTEROL 2 PUFF(S): 90 AEROSOL, METERED ORAL at 22:22

## 2017-11-29 RX ADMIN — Medication 500 MILLIGRAM(S): at 17:13

## 2017-11-29 NOTE — PROGRESS NOTE ADULT - SUBJECTIVE AND OBJECTIVE BOX
Patient is a 47y old  Male who presents with a chief complaint of hemoptysis (09 Nov 2017 00:32)  pt seems to be doing ok  on ventilator  chest tube place on left upper side:     Any change in ROS:     MEDICATIONS  (STANDING):  ALBUTerol    90 MICROgram(s) HFA Inhaler 2 Puff(s) Inhalation every 6 hours  buDESOnide 160 MICROgram(s)/formoterol 4.5 MICROgram(s) Inhaler 2 Puff(s) Inhalation two times a day  cefTRIAXone   IVPB      cefTRIAXone   IVPB 1 Gram(s) IV Intermittent every 24 hours  chlorhexidine 4% Liquid 1 Application(s) Topical daily  enoxaparin Injectable 40 milliGRAM(s) SubCutaneous daily  fentaNYL   Patch 100 MICROgram(s)/Hr. 1 Patch Transdermal every 48 hours  ferrous    sulfate 325 milliGRAM(s) Oral three times a day with meals  folic acid 1 milliGRAM(s) Oral daily  insulin lispro (HumaLOG) corrective regimen sliding scale   SubCutaneous every 6 hours  ipratropium 17 MICROgram(s) HFA Inhaler 1 Puff(s) Inhalation every 6 hours  polyethylene glycol 3350 17 Gram(s) Oral at bedtime  predniSONE   Tablet 30 milliGRAM(s) Oral daily  propofol Infusion 50 MICROgram(s)/kG/Min (22.5 mL/Hr) IV Continuous <Continuous>  senna Syrup 10 milliLiter(s) Oral at bedtime  valproic  acid Syrup 500 milliGRAM(s) Oral two times a day  vancomycin  IVPB 1000 milliGRAM(s) IV Intermittent every 8 hours  voriconazole IVPB 300 milliGRAM(s) IV Intermittent every 12 hours    MEDICATIONS  (PRN):  acetaminophen   Tablet 650 milliGRAM(s) Oral every 6 hours PRN For Temp greater than 38 C (100.4 F)  LORazepam   Injectable 1 milliGRAM(s) IV Push every 4 hours PRN Anxiety    Vital Signs Last 24 Hrs  T(C): 37.6 (29 Nov 2017 16:00), Max: 38.1 (29 Nov 2017 12:00)  T(F): 99.7 (29 Nov 2017 16:00), Max: 100.6 (29 Nov 2017 12:00)  HR: 114 (29 Nov 2017 17:10) (70 - 118)  BP: 135/82 (29 Nov 2017 17:00) (93/62 - 137/93)  BP(mean): 96 (29 Nov 2017 17:00) (68 - 98)  RR: 18 (29 Nov 2017 17:00) (11 - 23)  SpO2: 100% (29 Nov 2017 17:10) (97% - 100%)  Mode: AC/ CMV (Assist Control/ Continuous Mandatory Ventilation)  RR (machine): 12  TV (machine): 500  FiO2: 40  PEEP: 10  MAP: 18  PIP: 40    I&O's Summary    28 Nov 2017 07:01  -  29 Nov 2017 07:00  --------------------------------------------------------  IN: 3165.8 mL / OUT: 1990 mL / NET: 1175.8 mL    29 Nov 2017 07:01  -  29 Nov 2017 17:44  --------------------------------------------------------  IN: 1126.6 mL / OUT: 1650 mL / NET: -523.4 mL          Physical Exam:   GENERAL: NAD, well-groomed, well-developed  HEENT: GIOVANI/   Atraumatic, Normocephalic  ENMT: No tonsillar erythema, exudates, or enlargement; Moist mucous membranes, Good dentition, No lesions  NECK: Supple, No JVD, Normal thyroid  CHEST/LUNG: wheeze mild+  CVS: Regular rate and rhythm; No murmurs, rubs, or gallops  GI: : Soft, Nontender, Nondistended; Bowel sounds present  NERVOUS SYSTEM:  Alert & awake  EXTREMITIES:  2+ Peripheral Pulses, No clubbing, cyanosis, or edema  LYMPH: No lymphadenopathy noted  SKIN: No rashes or lesions  ENDOCRINOLOGY: No Thyromegaly  PSYCH: Appropriate    Labs:  ABG - ( 29 Nov 2017 03:30 )  pH: 7.43  /  pCO2: 66    /  pO2: 155   / HCO3: 41    / Base Excess: 17.1  /  SaO2: 99.6                                        7.2    9.43  )-----------( 137      ( 29 Nov 2017 03:30 )             22.3                         7.2    10.80 )-----------( 111      ( 28 Nov 2017 02:50 )             22.8                         7.9    13.03 )-----------( 114      ( 27 Nov 2017 03:46 )             25.1                         7.8    17.22 )-----------( 115      ( 26 Nov 2017 03:10 )             23.7     11-29    139  |  94<L>  |  12  ----------------------------<  138<H>  4.1   |  38<H>  |  0.57  11-28    142  |  95<L>  |  12  ----------------------------<  144<H>  4.3   |  40<H>  |  0.73  11-27    141  |  95<L>  |  10  ----------------------------<  108<H>  4.8   |  39<H>  |  0.66  11-26    142  |  96<L>  |  9   ----------------------------<  151<H>  4.1   |  38<H>  |  0.72    Ca    8.0<L>      29 Nov 2017 03:30  Ca    8.2<L>      28 Nov 2017 02:50  Phos  2.4     11-29  Phos  3.0     11-28  Mg     2.2     11-29  Mg     2.2     11-28    TPro  5.4<L>  /  Alb  2.4<L>  /  TBili  0.2  /  DBili  x   /  AST  12  /  ALT  8   /  AlkPhos  61  11-28  TPro  5.6<L>  /  Alb  2.5<L>  /  TBili  0.3  /  DBili  x   /  AST  15  /  ALT  9   /  AlkPhos  67  11-27  TPro  5.0<L>  /  Alb  2.4<L>  /  TBili  0.5  /  DBili  x   /  AST  12  /  ALT  7   /  AlkPhos  64  11-26    CAPILLARY BLOOD GLUCOSE      POCT Blood Glucose.: 104 mg/dL (29 Nov 2017 17:10)  POCT Blood Glucose.: 92 mg/dL (29 Nov 2017 11:37)  POCT Blood Glucose.: 142 mg/dL (29 Nov 2017 05:53)  POCT Blood Glucose.: 149 mg/dL (28 Nov 2017 23:38)      LIVER FUNCTIONS - ( 28 Nov 2017 02:50 )  Alb: 2.4 g/dL / Pro: 5.4 g/dL / ALK PHOS: 61 u/L / ALT: 8 u/L / AST: 12 u/L / GGT: x               Cultures:     Vancomycin Trough:     11-29 @ 13:20  16.3    Gentamicin:          11-29 @ 13:20  --    Vancomycin Trough:     11-28 @ 02:50  10.0    Gentamicin:          11-28 @ 02:50  --      Blood Culture:           11-25 @ 15:47  Organism Klebsiella pneumoniae  Gram stain --    Blood Culture:           11-25 @ 12:46  Organism Staphylococcus epidermidis  Gram stain --      Urine Culture:          11-25 @ 15:47  Organism Klebsiella pneumoniae    Urine Culture:          11-25 @ 12:46  Organism Staphylococcus epidermidis      Wound culture:                11-26 @ 22:40  Organism --  Culture w/ gram stain --  Specimen Source BLOOD    Wound culture:                11-25 @ 15:47  Organism Klebsiella pneumoniae  Culture w/ gram stain --  Specimen Source URINE CATHETER    Wound culture:                11-25 @ 12:46  Organism Staphylococcus epidermidis  Culture w/ gram stain --  Specimen Source BLOOD    Wound culture:                11-23 @ 17:05  Organism --  Culture w/ gram stain --  Specimen Source TRACHEAL ASPIRATE      Abscess culture:             11-26 @ 22:40  Organism --  Gram Stain --  Specimen Source BLOOD    Abscess culture:             11-25 @ 15:47  Organism Klebsiella pneumoniae  Gram Stain --  Specimen Source URINE CATHETER    Abscess culture:             11-25 @ 12:46  Organism Staphylococcus epidermidis  Gram Stain --  Specimen Source BLOOD    Abscess culture:             11-23 @ 17:05  Organism --  Gram Stain --  Specimen Source TRACHEAL ASPIRATE      CSF:              11-25 @ 15:47  Organism Klebsiella pneumoniae  Gram Stain --    CSF:              11-25 @ 12:46  Organism Staphylococcus epidermidis  Gram Stain --      Tissue culture:           11-26 @ 22:40  Organism --  Gram Stain --  Specimen Source BLOOD    Tissue culture:           11-25 @ 15:47  Organism Klebsiella pneumoniae  Gram Stain --  Specimen Source URINE CATHETER    Tissue culture:           11-25 @ 12:46  Organism Staphylococcus epidermidis  Gram Stain --  Specimen Source BLOOD    Tissue culture:           11-23 @ 17:05  Organism --  Gram Stain --  Specimen Source TRACHEAL ASPIRATE      Body Fluid Smear & Culture:                        11-26 @ 22:40  AFB Smear  --  Culture Acid Fast Body Fluid w/ Smear  --  Culture Acid Fast Smear Concentrated   --    Culture Results:     --  Specimen Source BLOOD    Body Fluid Smear & Culture:                        11-25 @ 15:47  AFB Smear  --  Culture Acid Fast Body Fluid w/ Smear  --  Culture Acid Fast Smear Concentrated   --    Culture Results:     --  Specimen Source URINE CATHETER    Body Fluid Smear & Culture:                        11-25 @ 12:46  AFB Smear  --  Culture Acid Fast Body Fluid w/ Smear  --  Culture Acid Fast Smear Concentrated   --    Culture Results:     --  Specimen Source BLOOD    Body Fluid Smear & Culture:                        11-23 @ 17:05  AFB Smear  --  Culture Acid Fast Body Fluid w/ Smear  --  Culture Acid Fast Smear Concentrated   --    Culture Results:     --  Specimen Source TRACHEAL ASPIRATE        < from: Xray Chest 1 View AP-PORTABLE IMMEDIATE (11.28.17 @ 18:04) >    ******PRELIMINARY REPORT******    ******PRELIMINARY REPORT******            EXAM:  RAD CHEST PORTABLE IMMEDIATE        PROCEDURE DATE:  Nov 28 2017     ******PRELIMINARY REPORT******    ******PRELIMINARY REPORT******            INTERPRETATION:  Moderate left pneumothorax, however slightly decreased   in size compared to the prior CT.            ******PRELIMINARY REPORT******    ******PRELIMINARY REPORT******          PAT SANTIAGO M.D., RADIOLOGY RESIDENT    < end of copied text >        Studies  Chest X-RAY  CT SCAN Chest   Venous Dopplers: LE:   CT Abdomen  Others

## 2017-11-29 NOTE — PROGRESS NOTE ADULT - SUBJECTIVE AND OBJECTIVE BOX
CHIEF COMPLAINT: Hemoptysis    Interval Events: Pigtail catheter placed yesterday for left apical PTX on CT chest with persistent concern for PTX as etiology of patient's worsening respiratory status, repeat imaging showing small interval decrease in airspace but without lung re-expansion.     REVIEW OF SYSTEMS:  Constitutional: [ ] negative [ ] fevers [ ] chills [ ] weight loss [ ] weight gain  HEENT: [ ] negative [ ] dry eyes [ ] eye irritation [ ] postnasal drip [ ] nasal congestion  CV: [ ] negative  [ ] chest pain [ ] orthopnea [ ] palpitations [ ] murmur  Resp: [ ] negative [ ] cough [ ] shortness of breath [ ] dyspnea [ ] wheezing [ ] sputum [ ] hemoptysis  GI: [ ] negative [ ] nausea [ ] vomiting [ ] diarrhea [ ] constipation [ ] abd pain [ ] dysphagia   : [ ] negative [ ] dysuria [ ] nocturia [ ] hematuria [ ] increased urinary frequency  Musculoskeletal: [ ] negative [ ] back pain [ ] myalgias [ ] arthralgias [ ] fracture  Skin: [ ] negative [ ] rash [ ] itch  Neurological: [ ] negative [ ] headache [ ] dizziness [ ] syncope [ ] weakness [ ] numbness  Psychiatric: [ ] negative [ ] anxiety [ ] depression  Endocrine: [ ] negative [ ] diabetes [ ] thyroid problem  Hematologic/Lymphatic: [ ] negative [ ] anemia [ ] bleeding problem  Allergic/Immunologic: [ ] negative [ ] itchy eyes [ ] nasal discharge [ ] hives [ ] angioedema  [ ] All other systems negative  [ ] Unable to assess ROS because ________    OBJECTIVE:  ICU Vital Signs Last 24 Hrs  T(C): 36.4 (29 Nov 2017 04:00), Max: 36.8 (28 Nov 2017 12:00)  T(F): 97.6 (29 Nov 2017 04:00), Max: 98.2 (28 Nov 2017 12:00)  HR: 93 (29 Nov 2017 06:00) (70 - 103)  BP: 103/68 (29 Nov 2017 06:00) (92/62 - 146/89)  BP(mean): 76 (29 Nov 2017 06:00) (68 - 102)  ABP: --  ABP(mean): --  RR: 13 (29 Nov 2017 06:00) (12 - 20)  SpO2: 97% (29 Nov 2017 06:00) (96% - 100%)    Mode: AC/ CMV (Assist Control/ Continuous Mandatory Ventilation), RR (machine): 12, TV (machine): 500, FiO2: 40, PEEP: 10, MAP: 15, PIP: 33    11-27 @ 07:01  - 11-28 @ 07:00  --------------------------------------------------------  IN: 3348 mL / OUT: 1365 mL / NET: 1983 mL    11-28 @ 07:01 - 11-29 @ 06:51  --------------------------------------------------------  IN: 3165.8 mL / OUT: 1990 mL / NET: 1175.8 mL      CAPILLARY BLOOD GLUCOSE      POCT Blood Glucose.: 142 mg/dL (29 Nov 2017 05:53)      PHYSICAL EXAM:  General:   HEENT:   Lymph Nodes:  Neck:   Respiratory:   Cardiovascular:   Abdomen:   Extremities:   Skin:   Neurological:  Psychiatry:    LINES:    HOSPITAL MEDICATIONS:  Standing Meds:  ALBUTerol    90 MICROgram(s) HFA Inhaler 2 Puff(s) Inhalation every 6 hours  buDESOnide 160 MICROgram(s)/formoterol 4.5 MICROgram(s) Inhaler 2 Puff(s) Inhalation two times a day  cefTRIAXone   IVPB      cefTRIAXone   IVPB 1 Gram(s) IV Intermittent every 24 hours  chlorhexidine 4% Liquid 1 Application(s) Topical daily  enoxaparin Injectable 40 milliGRAM(s) SubCutaneous daily  fentaNYL   Infusion 2 MICROgram(s)/kG/Hr IV Continuous <Continuous>  fentaNYL   Patch 100 MICROgram(s)/Hr. 1 Patch Transdermal every 48 hours  ferrous    sulfate 325 milliGRAM(s) Oral three times a day with meals  folic acid 1 milliGRAM(s) Oral daily  insulin lispro (HumaLOG) corrective regimen sliding scale   SubCutaneous every 6 hours  ipratropium 17 MICROgram(s) HFA Inhaler 1 Puff(s) Inhalation every 6 hours  petrolatum Ophthalmic Ointment 1 Application(s) Both EYES two times a day  polyethylene glycol 3350 17 Gram(s) Oral at bedtime  predniSONE   Tablet 30 milliGRAM(s) Oral daily  propofol Infusion 50 MICROgram(s)/kG/Min IV Continuous <Continuous>  senna Syrup 10 milliLiter(s) Oral at bedtime  valproic  acid Syrup 500 milliGRAM(s) Oral two times a day  vancomycin  IVPB 1000 milliGRAM(s) IV Intermittent every 8 hours  voriconazole IVPB 300 milliGRAM(s) IV Intermittent every 12 hours      PRN Meds:  acetaminophen   Tablet 650 milliGRAM(s) Oral every 6 hours PRN  LORazepam   Injectable 1 milliGRAM(s) IV Push every 4 hours PRN      LABS:                        7.2    10.80 )-----------( 111      ( 28 Nov 2017 02:50 )             22.8     Hgb Trend: 7.2<--, 7.9<--, 7.8<--, 8.2<--, 7.7<--  11-29    139  |  94<L>  |  12  ----------------------------<  138<H>  4.1   |  38<H>  |  0.57    Ca    8.0<L>      29 Nov 2017 03:30  Phos  2.4     11-29  Mg     2.2     11-29    TPro  5.4<L>  /  Alb  2.4<L>  /  TBili  0.2  /  DBili  x   /  AST  12  /  ALT  8   /  AlkPhos  61  11-28    Creatinine Trend: 0.57<--, 0.73<--, 0.66<--, 0.72<--, 0.65<--, 0.67<--      Arterial Blood Gas:  11-29 @ 03:30  7.43/66/155/41/99.6/17.1  ABG lactate: 1.1  Arterial Blood Gas:  11-28 @ 13:09  7.47/57/136/39/99.7/15.5  ABG lactate: --  Arterial Blood Gas:  11-28 @ 01:20  7.43/64/215/40/99.9/16.1  ABG lactate: --        MICROBIOLOGY:     Culture - Blood (collected 26 Nov 2017 22:40)  Source: BLOOD  Preliminary Report (27 Nov 2017 22:40):    NO ORGANISMS ISOLATED    NO ORGANISMS ISOLATED AT 24 HOURS      RADIOLOGY:  [ ] Reviewed and interpreted by me    EKG: CHIEF COMPLAINT: Hemoptysis    Interval Events: Pigtail catheter placed yesterday for left apical PTX on CT chest with persistent concern for PTX as etiology of patient's worsening respiratory status, repeat imaging showing small interval decrease in airspace but without lung re-expansion.     REVIEW OF SYSTEMS:  Constitutional: [ x] negative [ ] fevers [ ] chills [ ] weight loss [ ] weight gain  HEENT: [ x] negative [ ] dry eyes [ ] eye irritation [ ] postnasal drip [ ] nasal congestion  CV: [x ] negative  [ ] chest pain [ ] orthopnea [ ] palpitations [ ] murmur  Resp: [ x] negative [ ] cough [ ] shortness of breath [ ] dyspnea [ ] wheezing [ ] sputum [ ] hemoptysis  GI: [x ] negative [ ] nausea [ ] vomiting [ ] diarrhea [ ] constipation [ ] abd pain [ ] dysphagia   : [x ] negative [ ] dysuria [ ] nocturia [ ] hematuria [ ] increased urinary frequency  Musculoskeletal: [ x] negative [ ] back pain [ ] myalgias [ ] arthralgias [ ] fracture  Skin: [x ] negative [ ] rash [ ] itch  Neurological: [x ] negative [ ] headache [ ] dizziness [ ] syncope [ ] weakness [ ] numbness  Psychiatric: [x ] negative [ ] anxiety [ ] depression  Endocrine: [ ] negative [ ] diabetes [ ] thyroid problem  Hematologic/Lymphatic: [ ] negative [ ] anemia [ ] bleeding problem  Allergic/Immunologic: [ ] negative [ ] itchy eyes [ ] nasal discharge [ ] hives [ ] angioedema  [ ] All other systems negative  [ ] Unable to assess ROS because ________    OBJECTIVE:  ICU Vital Signs Last 24 Hrs  T(C): 36.4 (29 Nov 2017 04:00), Max: 36.8 (28 Nov 2017 12:00)  T(F): 97.6 (29 Nov 2017 04:00), Max: 98.2 (28 Nov 2017 12:00)  HR: 93 (29 Nov 2017 06:00) (70 - 103)  BP: 103/68 (29 Nov 2017 06:00) (92/62 - 146/89)  BP(mean): 76 (29 Nov 2017 06:00) (68 - 102)  ABP: --  ABP(mean): --  RR: 13 (29 Nov 2017 06:00) (12 - 20)  SpO2: 97% (29 Nov 2017 06:00) (96% - 100%)    Mode: AC/ CMV (Assist Control/ Continuous Mandatory Ventilation), RR (machine): 12, TV (machine): 500, FiO2: 40, PEEP: 10, MAP: 15, PIP: 33    11-27 @ 07:01 - 11-28 @ 07:00  --------------------------------------------------------  IN: 3348 mL / OUT: 1365 mL / NET: 1983 mL    11-28 @ 07:01 - 11-29 @ 06:51  --------------------------------------------------------  IN: 3165.8 mL / OUT: 1990 mL / NET: 1175.8 mL      CAPILLARY BLOOD GLUCOSE      POCT Blood Glucose.: 142 mg/dL (29 Nov 2017 05:53)      PHYSICAL EXAM:  General: alert, oriented to self and place (hospital)  HEENT: normal  Lymph Nodes: no adenopathy  Neck: supple  Respiratory: clear b/l  Cardiovascular: RRR no MRG  Abdomen: soft, NTND  Extremities: no edema, warm and well perfused  Skin: intact, venous stasis changes to b/l LE  Neurological: moves all extremities  Psychiatry: normal    LINES:    HOSPITAL MEDICATIONS:  Standing Meds:  ALBUTerol    90 MICROgram(s) HFA Inhaler 2 Puff(s) Inhalation every 6 hours  buDESOnide 160 MICROgram(s)/formoterol 4.5 MICROgram(s) Inhaler 2 Puff(s) Inhalation two times a day  cefTRIAXone   IVPB      cefTRIAXone   IVPB 1 Gram(s) IV Intermittent every 24 hours  chlorhexidine 4% Liquid 1 Application(s) Topical daily  enoxaparin Injectable 40 milliGRAM(s) SubCutaneous daily  fentaNYL   Infusion 2 MICROgram(s)/kG/Hr IV Continuous <Continuous>  fentaNYL   Patch 100 MICROgram(s)/Hr. 1 Patch Transdermal every 48 hours  ferrous    sulfate 325 milliGRAM(s) Oral three times a day with meals  folic acid 1 milliGRAM(s) Oral daily  insulin lispro (HumaLOG) corrective regimen sliding scale   SubCutaneous every 6 hours  ipratropium 17 MICROgram(s) HFA Inhaler 1 Puff(s) Inhalation every 6 hours  petrolatum Ophthalmic Ointment 1 Application(s) Both EYES two times a day  polyethylene glycol 3350 17 Gram(s) Oral at bedtime  predniSONE   Tablet 30 milliGRAM(s) Oral daily  propofol Infusion 50 MICROgram(s)/kG/Min IV Continuous <Continuous>  senna Syrup 10 milliLiter(s) Oral at bedtime  valproic  acid Syrup 500 milliGRAM(s) Oral two times a day  vancomycin  IVPB 1000 milliGRAM(s) IV Intermittent every 8 hours  voriconazole IVPB 300 milliGRAM(s) IV Intermittent every 12 hours      PRN Meds:  acetaminophen   Tablet 650 milliGRAM(s) Oral every 6 hours PRN  LORazepam   Injectable 1 milliGRAM(s) IV Push every 4 hours PRN      LABS:                        7.2    10.80 )-----------( 111      ( 28 Nov 2017 02:50 )             22.8     Hgb Trend: 7.2<--, 7.9<--, 7.8<--, 8.2<--, 7.7<--  11-29    139  |  94<L>  |  12  ----------------------------<  138<H>  4.1   |  38<H>  |  0.57    Ca    8.0<L>      29 Nov 2017 03:30  Phos  2.4     11-29  Mg     2.2     11-29    TPro  5.4<L>  /  Alb  2.4<L>  /  TBili  0.2  /  DBili  x   /  AST  12  /  ALT  8   /  AlkPhos  61  11-28    Creatinine Trend: 0.57<--, 0.73<--, 0.66<--, 0.72<--, 0.65<--, 0.67<--      Arterial Blood Gas:  11-29 @ 03:30  7.43/66/155/41/99.6/17.1  ABG lactate: 1.1  Arterial Blood Gas:  11-28 @ 13:09  7.47/57/136/39/99.7/15.5  ABG lactate: --  Arterial Blood Gas:  11-28 @ 01:20  7.43/64/215/40/99.9/16.1  ABG lactate: --        MICROBIOLOGY:     Culture - Blood (collected 26 Nov 2017 22:40)  Source: BLOOD  Preliminary Report (27 Nov 2017 22:40):    NO ORGANISMS ISOLATED    NO ORGANISMS ISOLATED AT 24 HOURS      RADIOLOGY:  [ ] Reviewed and interpreted by me    EKG:

## 2017-11-29 NOTE — PROGRESS NOTE ADULT - PROBLEM SELECTOR PLAN 5
on prednisone.  11/10 continue steroid  11/11 on steroid  11/12 on oral steroid  11/13: cont oral steroids!!  11/14: Has pretty poor lungs secondary to steroids: Cont current therapy!!  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  11/27: cont on steroids  11/29: on 30 mg of prednisone

## 2017-11-29 NOTE — PROGRESS NOTE ADULT - PROBLEM SELECTOR PLAN 1
now spiked to fever:  on zosyn, vancomycin, as well as voriconazole~  id FOLLOW UP  11/27: Cont antibiotics::  11/28: cont current treatment  11/29: ON CEFTRIAXONE, VANCOMYCIN AS WELL AS ANTIFUNGAL AFNT: BLOOD CULTRES WITH STAPH EPIDERMIDIS

## 2017-11-29 NOTE — PROGRESS NOTE ADULT - PROBLEM SELECTOR PLAN 3
? 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 ot from bronchiectasis? eliquis have been dced: needs card to se: In addition, he may need IR help with embolization, it his hemoptysis increases: He has had pleurodesis on the left side.  11/10 quantify hemoptysis. no AC. stable  11/11 no more hemoptysis. off AC  11/12 resolved. off AC  11/13: today had hemoptysis again----->localize site---> IR embolization: left a message for thoracic team  11/14: DW Dr Caldera: for repeat bronchoscopy to localize the site of bleeding and need IR help to embolize the culprit vessel!  11/15: started on broad spectrum antibiotics and for bronchoscopy today  11/16: s/p IR emobolization done: monitor for more hemoptysis  11/17: s/p bronchsocopy: reportedly old blood seen in airways!  11/18 s/p bronchoscopy in ICU via ETT. old clot. no active bleeding  11/19 stable. no active bleeding per recent bronchoscopy  11/20; off AC at this time  11/22: resolved: remained off ac  11/23: remained off AC: has hx of PAF  11/24: remains off AC  11/27: no bleeding noted  11/29": no new bleeding

## 2017-11-29 NOTE — CHART NOTE - NSCHARTNOTEFT_GEN_A_CORE
: Carola Miramontes R3, Dr. Medina    INDICATION: s/p chest tube for pneumothorax    PROCEDURE:  [x ] LIMITED ECHO  [x ] LIMITED CHEST  [ ] LIMITED RETROPERITONEAL  [ ] LIMITED ABDOMINAL  [ ] LIMITED DVT  [ ] NEEDLE GUIDANCE VASCULAR  [ ] NEEDLE GUIDANCE THORACENTESIS  [ ] NEEDLE GUIDANCE PARACENTESIS  [ ] NEEDLE GUIDANCE PERICARDIOCENTESIS  [ ] OTHER    FINDINGS:      INTERPRETATION: : Carola Miramontes R3, Dr. Medina    INDICATION: s/p chest tube for pneumothorax    PROCEDURE:  [] LIMITED ECHO  [x ] LIMITED CHEST  [ ] LIMITED RETROPERITONEAL  [ ] LIMITED ABDOMINAL  [ ] LIMITED DVT  [ ] NEEDLE GUIDANCE VASCULAR  [ ] NEEDLE GUIDANCE THORACENTESIS  [ ] NEEDLE GUIDANCE PARACENTESIS  [ ] NEEDLE GUIDANCE PERICARDIOCENTESIS  [ ] OTHER    FINDINGS:  Limited chest  R anterior lung field is A line predominant. Small R sided pleural effusion is visible. Lung sliding is clearly visible at L anterior lung fields.     INTERPRETATION:  Small R sided pleural effusion is visible. Lung sliding is visible at L anterior lung fields

## 2017-11-29 NOTE — PROGRESS NOTE ADULT - ASSESSMENT
47M with hx of sarcoidosis, started on Eqliuis for cerebrovascular infarct with PFO on echo and paroxysmal afib in September c/b recurrent hemoptysis (Eliquis d/c'd on 9/20, then restarted between 10/23-27 after hemoptysis improved), d/c'd to rehab then returned 11/9 for recurrent hemoptysis requiring intubation and CTICU admission for management of hemoptysis including IR embolization (11/16) and serial bronch (11/17 and 11/20) in CTICU with no active bleeding visualized, now remains intubated with difficulty weaning vent as during CPAP trials pt fails to initiate breaths, unclear etiology. Transferred to MICU from CTICU for continued vent management and attempted weaning. Bedside trach placed 11/24 c/b episode of hypoxia and bleeding around trach site after suctioning, resolved after surgicel and pressure. Has been febrile intermittently, though improving, grew Klebsiella on urine cx, blood cx with staph epi.     # Neuro:  -on propofol gtt at 30mcg/kg/min; attempts to wean unsuccessful as becomes anxious.   -Fentanyl gtt/patch for pain control;  ativan prn anxiety  -Able to follow commands, trying to wean sedation but becomes discordant and anxious off sedation    #Cardiovascular:  -Requiring intermittent pressors, off levophed x >24 hours now  -A-line removed several days ago.     #Respiratory:  -Trach placed 6 days ago at bedside  tachypnea improved in last 24 hours, max RR 24  -repeat CT chest prelim shows 5 X 3.3 cm outpouching of air from L pneumothorax that extends between anterolateral/5th-6th ribs into subcutaneous tissue.   -Bloody secretions from trach that appear old; may need repeat bronch   -pt was becoming hypercapnic, PCO2 of 88, TV  changed from 450 to 500; would consider going down on PEEP, given ABG with pO2 in the 200s, and pt has large DAVID bleb on repeat CT chest   -Continue bronchodilators and pulmonary toilet  -on predisone 30 mg for sarcoidosis, consider trying to taper    #GI:  -Restarted feeds yesterday via new KO tube   -Has started having bowel movements    #Renal:  -Monitor I's and O's (net ~2L)  -consider fluids given low urine output    #Heme:  -H/H stable; baseline Hb 9-10;   -thrombocytopenia stable    #ID:  -Aspergilloma dx 9/24/17, on voriconazole x 6 weeks per ID recommendations. Recommend continuing voriconazole given hemoptysis recurred after was stopped, possible related to inflammation 2/2 aspergilloma and possible continued infection.  -Recultured and started on antibiotics 11/26 for fevers up to 103F. Last fever yesterday was at 8 am 100.4.   -UA grossly positive, pt grew Klebsiella 100K colonies. Pansensitive. Deescalate zosyn to ceftriaxone.   -Given pt consistently grows staph epi, will c/w vanco, may need JONATAN for evaluation    #Endocrine:   -On tube feeds, FS q6h, SS,  --172    #DVT PPX: octavio Cantu, PGY3  Emergency Medicine  [id number]

## 2017-11-29 NOTE — CHART NOTE - NSCHARTNOTEFT_GEN_A_CORE
NUTRITION FOLLOW-UP:  Pt. remains sedated.  Per nursing, Pt. tolerating enteral feeding of Jevity 1.2 @ 40mL/hr x 24hrs.  As previously recommended, would increase to 70mL/hr x 24hrs to more adequately meet estimated nutrient needs, which was discussed w physicians.  No stated/noted diarrhea/constipation/vomiting at this time.  Pt. with significant weight increase, which is perhaps fluid related.  Remains malnourished given suboptimal nutrient intake & edema.      Current Weight:  232.5lbs (105.5kg) on 11/29/17.       Admission Weight: 165.3lbs (75kg) on 11/10/17.    Edema:  2+ generalized.  3+ scrotum.      Skin: No noted pressure ulcers.      Pertinent Medications: MEDICATIONS  (STANDING):  ALBUTerol    90 MICROgram(s) HFA Inhaler 2 Puff(s) Inhalation every 6 hours  buDESOnide 160 MICROgram(s)/formoterol 4.5 MICROgram(s) Inhaler 2 Puff(s) Inhalation two times a day  cefTRIAXone   IVPB      cefTRIAXone   IVPB 1 Gram(s) IV Intermittent every 24 hours  chlorhexidine 4% Liquid 1 Application(s) Topical daily  enoxaparin Injectable 40 milliGRAM(s) SubCutaneous daily  fentaNYL   Infusion 2 MICROgram(s)/kG/Hr (15 mL/Hr) IV Continuous <Continuous>  fentaNYL   Patch 100 MICROgram(s)/Hr. 1 Patch Transdermal every 48 hours  ferrous    sulfate 325 milliGRAM(s) Oral three times a day with meals  folic acid 1 milliGRAM(s) Oral daily  insulin lispro (HumaLOG) corrective regimen sliding scale   SubCutaneous every 6 hours  ipratropium 17 MICROgram(s) HFA Inhaler 1 Puff(s) Inhalation every 6 hours  petrolatum Ophthalmic Ointment 1 Application(s) Both EYES two times a day  polyethylene glycol 3350 17 Gram(s) Oral at bedtime  predniSONE   Tablet 30 milliGRAM(s) Oral daily  propofol Infusion 50 MICROgram(s)/kG/Min (22.5 mL/Hr) IV Continuous <Continuous>  senna Syrup 10 milliLiter(s) Oral at bedtime  valproic  acid Syrup 500 milliGRAM(s) Oral two times a day  vancomycin  IVPB 1000 milliGRAM(s) IV Intermittent every 8 hours  voriconazole IVPB 300 milliGRAM(s) IV Intermittent every 12 hours    MEDICATIONS  (PRN):  acetaminophen   Tablet 650 milliGRAM(s) Oral every 6 hours PRN For Temp greater than 38 C (100.4 F)  LORazepam   Injectable 1 milliGRAM(s) IV Push every 4 hours PRN Anxiety    Pertinent Labs:  11-29 Na139 mmol/L Glu 138 mg/dL<H> K+ 4.1 mmol/L Cr  0.57 mg/dL BUN 12 mg/dL Phos 2.4 mg/dL<L> Alb n/a   PAB n/a     CAPILLARY BLOOD GLUCOSE      POCT Blood Glucose.: 92 mg/dL (29 Nov 2017 11:37)  POCT Blood Glucose.: 142 mg/dL (29 Nov 2017 05:53)  POCT Blood Glucose.: 149 mg/dL (28 Nov 2017 23:38)  POCT Blood Glucose.: 118 mg/dL (28 Nov 2017 17:33)        Current Diet: Jevity 1.2 via NGT @ goal of 40mL/hr x 24hrs.          PLAN/RECOMMENDATIONS:    1) Increase Jevity 1.2 to goal of 70mL/hr x 24hrs      [will provide 2016Kcals & ~93g protein, daily]    2) Monitor tolerance to enteral nutrition.  Keep HOB >45 degrees.  3) Obtain daily weights.  4) RDN remains available and will f/u PRN.          Melody Avalos, SYLVIA, CDN pager [phone]

## 2017-11-29 NOTE — PROGRESS NOTE ADULT - ATTENDING COMMENTS
pt doing poorly  paralyzed, sedated but not requiring high FIO2 :   no bleeding noted:  try to wean sedation as well as paralyzing agent   Steroids have been decreased: goal would be rapidly tapered down steroids to minimum possible given h e is on sedation and paralyzing agent: But per reports pt gets very agitated when taken of paralyzing agent!    11/20: off paralyzing agent; cont current care: may decrease steroids to 30 mg in a day or two    11/21: off sedation : alert and awake and responds to simple commands; weaning trials!!  11/28: ct reviewed: lung herniation noted: for possible chest tube today:   11/23: continues to be on ventilator: failed weaning attempts multiple times: for trach now: prognosis guarded!!  11/24: stable" but critical!!  11/25: NEW FEVER: PANCULTURE AS WELL AS EMPIRIC ANTIBIOTICS:  11/29: Cont broad spectrum antibiotics: attempt weaning

## 2017-11-29 NOTE — PROGRESS NOTE ADULT - PROBLEM SELECTOR PLAN 2
intubated: try to titrate as tolerated!  11/18 on ventilator support. Pt is sedated and paralyzed. defer ventilator Management/ weaning per CTICU team.  11/19 still sedated and paralyzed. Plan to wean off Nimbex today per Primary RN. ABGs reviewed.  11/20:ff paralyzing agent! But sedated !!  11/21: awake and alert: responding to simple commands: weaning trials  11/22: currently sedated: ? trach now?  11/23: for trach in AM  11/24: trach today  11/25: S/P trach yesterday: chest x-ray noted: Cont full vent support and wean if tolerates  11/26: [id number] with trach and full mechanical ventilation: Discussed with MICU attending in detail: for rpt ct scan chest  11/28: rpt ct scan c hst reviewed: for possible chest tube on rt side  11/29: S/P chest tube placed left upper side: the lung failed to rexpand fully: phuc monitor

## 2017-11-29 NOTE — PROGRESS NOTE ADULT - PROBLEM SELECTOR PLAN 4
Cont BD with inhaled steroids and oral steroids  11/114: Pts wheezing has significantly improved: he will need long term steroids as he starts wheezing once his steroids are dced!  11/15: Wheezing has increased: started on IV steroids high dose by thoracic team: Would suggest to decrease to 20 mg three times a day !  11/16: decrease steroids today to 20 mg three times day  11/17: try to decrease steroids in AM  11/18 Duoneb. intubated for acute respiratory distress with hypoxia  11/19 KIARA. intubated and on ventilator support  11/20: cont vent support: start weaning as tolerated!  11/21: would decrease  his steroids to 30 mg a day from tomorrow  11/22: DECREASE STEROIDS: HE WHEEZES WHEN THE STEROIDS ARE TAPERED: BUT NEED TO BE LOWERED NOW AND WATCH!!  11/23: on 40 mg of prednisone: try to decrease and taper  11/24: prednisone decreased to 30 ,g a day !!  11/25: on 30 mg a day now:  11/27: on steroids: for repeat ct scan chest today  11/28: ct reviewed with [facility] attending    11/29:: s/p chest tube placement on left side!

## 2017-11-30 LAB
ALBUMIN SERPL ELPH-MCNC: 2.2 G/DL — LOW (ref 3.3–5)
ALP SERPL-CCNC: 65 U/L — SIGNIFICANT CHANGE UP (ref 40–120)
ALT FLD-CCNC: 12 U/L — SIGNIFICANT CHANGE UP (ref 4–41)
AST SERPL-CCNC: 18 U/L — SIGNIFICANT CHANGE UP (ref 4–40)
BASOPHILS # BLD AUTO: 0.01 K/UL — SIGNIFICANT CHANGE UP (ref 0–0.2)
BASOPHILS NFR BLD AUTO: 0.1 % — SIGNIFICANT CHANGE UP (ref 0–2)
BILIRUB SERPL-MCNC: 0.2 MG/DL — SIGNIFICANT CHANGE UP (ref 0.2–1.2)
BUN SERPL-MCNC: 9 MG/DL — SIGNIFICANT CHANGE UP (ref 7–23)
CALCIUM SERPL-MCNC: 8.2 MG/DL — LOW (ref 8.4–10.5)
CHLORIDE SERPL-SCNC: 98 MMOL/L — SIGNIFICANT CHANGE UP (ref 98–107)
CO2 SERPL-SCNC: 41 MMOL/L — HIGH (ref 22–31)
CREAT SERPL-MCNC: 0.7 MG/DL — SIGNIFICANT CHANGE UP (ref 0.5–1.3)
EOSINOPHIL # BLD AUTO: 0.01 K/UL — SIGNIFICANT CHANGE UP (ref 0–0.5)
EOSINOPHIL NFR BLD AUTO: 0.1 % — SIGNIFICANT CHANGE UP (ref 0–6)
GLUCOSE BLDC GLUCOMTR-MCNC: 100 MG/DL — HIGH (ref 70–99)
GLUCOSE BLDC GLUCOMTR-MCNC: 119 MG/DL — HIGH (ref 70–99)
GLUCOSE BLDC GLUCOMTR-MCNC: 120 MG/DL — HIGH (ref 70–99)
GLUCOSE BLDC GLUCOMTR-MCNC: 130 MG/DL — HIGH (ref 70–99)
GLUCOSE SERPL-MCNC: 126 MG/DL — HIGH (ref 70–99)
HCT VFR BLD CALC: 25.2 % — LOW (ref 39–50)
HCT VFR BLD CALC: 26 % — LOW (ref 39–50)
HGB BLD-MCNC: 7.8 G/DL — LOW (ref 13–17)
HGB BLD-MCNC: 8.3 G/DL — LOW (ref 13–17)
IMM GRANULOCYTES # BLD AUTO: 0.1 # — SIGNIFICANT CHANGE UP
IMM GRANULOCYTES NFR BLD AUTO: 0.8 % — SIGNIFICANT CHANGE UP (ref 0–1.5)
LYMPHOCYTES # BLD AUTO: 1.3 K/UL — SIGNIFICANT CHANGE UP (ref 1–3.3)
LYMPHOCYTES # BLD AUTO: 10.1 % — LOW (ref 13–44)
MAGNESIUM SERPL-MCNC: 2.1 MG/DL — SIGNIFICANT CHANGE UP (ref 1.6–2.6)
MCHC RBC-ENTMCNC: 30.5 PG — SIGNIFICANT CHANGE UP (ref 27–34)
MCHC RBC-ENTMCNC: 31 % — LOW (ref 32–36)
MCHC RBC-ENTMCNC: 31.2 PG — SIGNIFICANT CHANGE UP (ref 27–34)
MCHC RBC-ENTMCNC: 31.9 % — LOW (ref 32–36)
MCV RBC AUTO: 97.7 FL — SIGNIFICANT CHANGE UP (ref 80–100)
MCV RBC AUTO: 98.4 FL — SIGNIFICANT CHANGE UP (ref 80–100)
MONOCYTES # BLD AUTO: 2.04 K/UL — HIGH (ref 0–0.9)
MONOCYTES NFR BLD AUTO: 15.9 % — HIGH (ref 2–14)
NEUTROPHILS # BLD AUTO: 9.41 K/UL — HIGH (ref 1.8–7.4)
NEUTROPHILS NFR BLD AUTO: 73 % — SIGNIFICANT CHANGE UP (ref 43–77)
NRBC # FLD: 0 — SIGNIFICANT CHANGE UP
NRBC # FLD: 0 — SIGNIFICANT CHANGE UP
PHOSPHATE SERPL-MCNC: 2.2 MG/DL — LOW (ref 2.5–4.5)
PLATELET # BLD AUTO: 138 K/UL — LOW (ref 150–400)
PLATELET # BLD AUTO: 197 K/UL — SIGNIFICANT CHANGE UP (ref 150–400)
PMV BLD: 13.3 FL — HIGH (ref 7–13)
PMV BLD: 13.3 FL — HIGH (ref 7–13)
POTASSIUM SERPL-MCNC: 4 MMOL/L — SIGNIFICANT CHANGE UP (ref 3.5–5.3)
POTASSIUM SERPL-SCNC: 4 MMOL/L — SIGNIFICANT CHANGE UP (ref 3.5–5.3)
PROT SERPL-MCNC: 5.3 G/DL — LOW (ref 6–8.3)
RBC # BLD: 2.56 M/UL — LOW (ref 4.2–5.8)
RBC # BLD: 2.66 M/UL — LOW (ref 4.2–5.8)
RBC # FLD: 15.2 % — HIGH (ref 10.3–14.5)
RBC # FLD: 15.4 % — HIGH (ref 10.3–14.5)
SODIUM SERPL-SCNC: 145 MMOL/L — SIGNIFICANT CHANGE UP (ref 135–145)
SPECIMEN SOURCE: SIGNIFICANT CHANGE UP
SPECIMEN SOURCE: SIGNIFICANT CHANGE UP
WBC # BLD: 12.87 K/UL — HIGH (ref 3.8–10.5)
WBC # BLD: 19.72 K/UL — HIGH (ref 3.8–10.5)
WBC # FLD AUTO: 12.87 K/UL — HIGH (ref 3.8–10.5)
WBC # FLD AUTO: 19.72 K/UL — HIGH (ref 3.8–10.5)

## 2017-11-30 PROCEDURE — 99291 CRITICAL CARE FIRST HOUR: CPT | Mod: 25

## 2017-11-30 PROCEDURE — 31624 DX BRONCHOSCOPE/LAVAGE: CPT

## 2017-11-30 PROCEDURE — 99232 SBSQ HOSP IP/OBS MODERATE 35: CPT | Mod: GC

## 2017-11-30 PROCEDURE — 71010: CPT | Mod: 26

## 2017-11-30 PROCEDURE — 99232 SBSQ HOSP IP/OBS MODERATE 35: CPT

## 2017-11-30 RX ORDER — PROPOFOL 10 MG/ML
100 INJECTION, EMULSION INTRAVENOUS ONCE
Qty: 0 | Refills: 0 | Status: COMPLETED | OUTPATIENT
Start: 2017-11-30 | End: 2017-11-30

## 2017-11-30 RX ORDER — PROPOFOL 10 MG/ML
5 INJECTION, EMULSION INTRAVENOUS ONCE
Qty: 0 | Refills: 0 | Status: COMPLETED | OUTPATIENT
Start: 2017-11-30 | End: 2017-11-30

## 2017-11-30 RX ORDER — NOREPINEPHRINE BITARTRATE/D5W 8 MG/250ML
0.01 PLASTIC BAG, INJECTION (ML) INTRAVENOUS
Qty: 8 | Refills: 0 | Status: DISCONTINUED | OUTPATIENT
Start: 2017-11-30 | End: 2017-12-03

## 2017-11-30 RX ORDER — FENTANYL CITRATE 50 UG/ML
100 INJECTION INTRAVENOUS ONCE
Qty: 0 | Refills: 0 | Status: DISCONTINUED | OUTPATIENT
Start: 2017-11-30 | End: 2017-11-30

## 2017-11-30 RX ORDER — CISATRACURIUM BESYLATE 2 MG/ML
20 INJECTION INTRAVENOUS ONCE
Qty: 0 | Refills: 0 | Status: COMPLETED | OUTPATIENT
Start: 2017-11-30 | End: 2017-11-30

## 2017-11-30 RX ORDER — ONDANSETRON 8 MG/1
4 TABLET, FILM COATED ORAL ONCE
Qty: 0 | Refills: 0 | Status: COMPLETED | OUTPATIENT
Start: 2017-11-30 | End: 2017-11-30

## 2017-11-30 RX ORDER — MIDAZOLAM HYDROCHLORIDE 1 MG/ML
2 INJECTION, SOLUTION INTRAMUSCULAR; INTRAVENOUS ONCE
Qty: 0 | Refills: 0 | Status: DISCONTINUED | OUTPATIENT
Start: 2017-11-30 | End: 2017-11-30

## 2017-11-30 RX ORDER — FERROUS SULFATE 325(65) MG
300 TABLET ORAL DAILY
Qty: 0 | Refills: 0 | Status: DISCONTINUED | OUTPATIENT
Start: 2017-11-30 | End: 2018-01-10

## 2017-11-30 RX ADMIN — ALBUTEROL 2 PUFF(S): 90 AEROSOL, METERED ORAL at 22:11

## 2017-11-30 RX ADMIN — Medication 1 PUFF(S): at 04:03

## 2017-11-30 RX ADMIN — Medication 63.75 MILLIMOLE(S): at 06:33

## 2017-11-30 RX ADMIN — ENOXAPARIN SODIUM 40 MILLIGRAM(S): 100 INJECTION SUBCUTANEOUS at 13:02

## 2017-11-30 RX ADMIN — Medication 6 MILLIGRAM(S): at 19:14

## 2017-11-30 RX ADMIN — Medication 1 PUFF(S): at 15:56

## 2017-11-30 RX ADMIN — FENTANYL CITRATE 100 MICROGRAM(S): 50 INJECTION INTRAVENOUS at 17:45

## 2017-11-30 RX ADMIN — PROPOFOL 5 MILLIGRAM(S): 10 INJECTION, EMULSION INTRAVENOUS at 18:00

## 2017-11-30 RX ADMIN — VORICONAZOLE 125 MILLIGRAM(S): 10 INJECTION, POWDER, LYOPHILIZED, FOR SOLUTION INTRAVENOUS at 00:29

## 2017-11-30 RX ADMIN — Medication 1 PUFF(S): at 09:33

## 2017-11-30 RX ADMIN — FENTANYL CITRATE 100 MICROGRAM(S): 50 INJECTION INTRAVENOUS at 18:00

## 2017-11-30 RX ADMIN — Medication 250 MILLIGRAM(S): at 22:17

## 2017-11-30 RX ADMIN — Medication 20 MILLIGRAM(S): at 13:02

## 2017-11-30 RX ADMIN — ALBUTEROL 2 PUFF(S): 90 AEROSOL, METERED ORAL at 04:03

## 2017-11-30 RX ADMIN — ONDANSETRON 4 MILLIGRAM(S): 8 TABLET, FILM COATED ORAL at 18:00

## 2017-11-30 RX ADMIN — PROPOFOL 22.5 MICROGRAM(S)/KG/MIN: 10 INJECTION, EMULSION INTRAVENOUS at 05:22

## 2017-11-30 RX ADMIN — PROPOFOL 100 MILLIGRAM(S): 10 INJECTION, EMULSION INTRAVENOUS at 18:00

## 2017-11-30 RX ADMIN — Medication 1 PUFF(S): at 22:10

## 2017-11-30 RX ADMIN — VORICONAZOLE 125 MILLIGRAM(S): 10 INJECTION, POWDER, LYOPHILIZED, FOR SOLUTION INTRAVENOUS at 10:57

## 2017-11-30 RX ADMIN — Medication 1 MILLIGRAM(S): at 13:02

## 2017-11-30 RX ADMIN — CEFTRIAXONE 100 GRAM(S): 500 INJECTION, POWDER, FOR SOLUTION INTRAMUSCULAR; INTRAVENOUS at 10:20

## 2017-11-30 RX ADMIN — BUDESONIDE AND FORMOTEROL FUMARATE DIHYDRATE 2 PUFF(S): 160; 4.5 AEROSOL RESPIRATORY (INHALATION) at 09:33

## 2017-11-30 RX ADMIN — BUDESONIDE AND FORMOTEROL FUMARATE DIHYDRATE 2 PUFF(S): 160; 4.5 AEROSOL RESPIRATORY (INHALATION) at 22:10

## 2017-11-30 RX ADMIN — MIDAZOLAM HYDROCHLORIDE 2 MILLIGRAM(S): 1 INJECTION, SOLUTION INTRAMUSCULAR; INTRAVENOUS at 20:30

## 2017-11-30 RX ADMIN — ALBUTEROL 2 PUFF(S): 90 AEROSOL, METERED ORAL at 09:33

## 2017-11-30 RX ADMIN — Medication 500 MILLIGRAM(S): at 21:05

## 2017-11-30 RX ADMIN — Medication 500 MILLIGRAM(S): at 06:32

## 2017-11-30 RX ADMIN — Medication 325 MILLIGRAM(S): at 09:34

## 2017-11-30 RX ADMIN — Medication 250 MILLIGRAM(S): at 05:18

## 2017-11-30 RX ADMIN — Medication 250 MILLIGRAM(S): at 14:07

## 2017-11-30 RX ADMIN — Medication 650 MILLIGRAM(S): at 21:05

## 2017-11-30 RX ADMIN — PROPOFOL 22.5 MICROGRAM(S)/KG/MIN: 10 INJECTION, EMULSION INTRAVENOUS at 09:34

## 2017-11-30 RX ADMIN — CHLORHEXIDINE GLUCONATE 1 APPLICATION(S): 213 SOLUTION TOPICAL at 13:02

## 2017-11-30 RX ADMIN — CISATRACURIUM BESYLATE 20 MILLIGRAM(S): 2 INJECTION INTRAVENOUS at 17:30

## 2017-11-30 RX ADMIN — Medication 6 MILLIGRAM(S): at 18:00

## 2017-11-30 RX ADMIN — ALBUTEROL 2 PUFF(S): 90 AEROSOL, METERED ORAL at 15:56

## 2017-11-30 RX ADMIN — CISATRACURIUM BESYLATE 20 MILLIGRAM(S): 2 INJECTION INTRAVENOUS at 18:56

## 2017-11-30 NOTE — PROGRESS NOTE ADULT - ASSESSMENT
47 year old with sarcoidosis and COPD presents after recent andree surgery with recurrent hemoptysis.     This is liklely multifactorial- Cystic lung changes, sarcoid, ? superimposed infection many all be contributing.     Continue voriconazole.    Blood culture with coag negative staph- repeat blood cultures were positive. ? phlebitis - although not seen on exam.  Consider 5-7 day course of vanco.  Klebsiella uti- 7 days of abx.

## 2017-11-30 NOTE — CHART NOTE - NSCHARTNOTEFT_GEN_A_CORE
Indication: Hemoptysis    Operators: CODEY Ho A Agrawal, DARVIN Musa    Pre-op Dx: hemoptysis    Preop medications:  - nimbex 20 mg IVP  - ativan 6 mg IVP  - propofol 100 mg IVP    Findings:  Bronchoscope inserted through trach. Trach noted to be in good position. Airway evaluation revealed old blood, no site of active bleeding. Upper airways investigated and found to have swollen epiglottis. Vocal cord difficult to visualize. Bronchoscope then withdrawn from trach.    Specimens: None    ----------------------------------------  Emilee Savage MD PGY-4  Pulmonary/Critical Care Fellow  Pager # [phone] ([facility]), [phone] ([facility])

## 2017-11-30 NOTE — PROGRESS NOTE ADULT - ASSESSMENT
47M with hx of sarcoidosis, started on Eqliuis for cerebrovascular infarct with PFO on echo and paroxysmal afib in September c/b recurrent hemoptysis (Eliquis d/c'd on 9/20, then restarted between 10/23-27 after hemoptysis improved), d/c'd to rehab then returned 11/9 for recurrent hemoptysis requiring intubation and CTICU admission for management of hemoptysis including IR embolization (11/16) and serial bronch (11/17 and 11/20) in CTICU with no active bleeding visualized, now remains intubated with difficulty weaning vent as during CPAP trials pt fails to initiate breaths, unclear etiology. Transferred to MICU from CTICU for continued vent management and attempted weaning. Bedside trach placed 11/24 c/b episode of hypoxia and bleeding around trach site after suctioning, resolved after surgicel and pressure. Has been febrile intermittently, though improving, grew Klebsiella on urine cx, blood cx with staph epi.     # Neuro:  -on propofol gtt at 30mcg/kg/min; attempts to wean unsuccessful as becomes anxious.   -Fentanyl gtt/patch for pain control;  ativan prn anxiety  -Able to follow commands, trying to wean sedation but becomes discordant and anxious off sedation    #Cardiovascular:  -Requiring intermittent pressors, off levophed x >24 hours now  -A-line removed several days ago.     #Respiratory:  -Trach placed 6 days ago at bedside  tachypnea improved in last 24 hours, max RR 24  -repeat CT chest prelim shows 5 X 3.3 cm outpouching of air from L pneumothorax that extends between anterolateral/5th-6th ribs into subcutaneous tissue.   -Bloody secretions from trach that appear old; may need repeat bronch   -pt was becoming hypercapnic, PCO2 of 88, TV  changed from 450 to 500; would consider going down on PEEP, given ABG with pO2 in the 200s, and pt has large DAVID bleb on repeat CT chest   -Continue bronchodilators and pulmonary toilet  -on predisone 30 mg for sarcoidosis, consider trying to taper    #GI:  -Restarted feeds yesterday via new KO tube   -Has started having bowel movements    #Renal:  -Monitor I's and O's (net ~2L)  -consider fluids given low urine output    #Heme:  -H/H stable; baseline Hb 9-10;   -thrombocytopenia stable    #ID:  -Aspergilloma dx 9/24/17, on voriconazole x 6 weeks per ID recommendations. Recommend continuing voriconazole given hemoptysis recurred after was stopped, possible related to inflammation 2/2 aspergilloma and possible continued infection.  -Recultured and started on antibiotics 11/26 for fevers up to 103F. Last fever yesterday was at 8 am 100.4.   -UA grossly positive, pt grew Klebsiella 100K colonies. Pansensitive. Deescalate zosyn to ceftriaxone.   -Given pt consistently grows staph epi, will c/w vanco, may need JONATAN for evaluation    #Endocrine:   -On tube feeds, FS q6h, SS,  --172    #DVT PPX: octavio Cantu, PGY3  Emergency Medicine  [id number] 47M with hx of sarcoidosis, started on Eqliuis for cerebrovascular infarct with PFO on echo and paroxysmal afib in September c/b recurrent hemoptysis (Eliquis d/c'd on 9/20, then restarted between 10/23-27 after hemoptysis improved), d/c'd to rehab then returned 11/9 for recurrent hemoptysis requiring intubation and CTICU admission for management of hemoptysis including IR embolization (11/16) and serial bronch (11/17 and 11/20) in CTICU with no active bleeding visualized, now remains intubated with difficulty weaning vent as during CPAP trials pt fails to initiate breaths, unclear etiology. Transferred to MICU from CTICU for continued vent management and attempted weaning. Bedside trach placed 11/24 c/b episode of hypoxia and bleeding around trach site after suctioning, resolved after surgicel and pressure. Has been febrile intermittently, though improving, grew Klebsiella on urine cx, blood cx with staph epi.     # Neuro:  -off sedation, doing well  -Able to follow commands, trying to wean sedation but becomes discordant and anxious off sedation    #Cardiovascular:  -Requiring intermittent pressors, off levophed x >24 hours now  -A-line removed several days ago.     #Respiratory:  -Trach placed 6 days ago at bedside  tachypnea improved in last 24 hours, max RR 24  -repeat CT chest prelim shows 5 X 3.3 cm outpouching of air from L pneumothorax that extends between anterolateral/5th-6th ribs into subcutaneous tissue.   -Bloody secretions from trach that appear old; may need repeat bronch   -pt was becoming hypercapnic, PCO2 of 88, TV  changed from 450 to 500; would consider going down on PEEP, given ABG with pO2 in the 200s, and pt has large DAVID bleb on repeat CT chest   -Continue bronchodilators and pulmonary toilet  -on predisone 30 mg for sarcoidosis, consider trying to taper    #GI:  -Restarted feeds yesterday via new KO tube   -Has started having bowel movements    #Renal:  -Monitor I's and O's (net ~2L)    #Heme:  -H/H stable; baseline Hb 9-10;   -thrombocytopenia improving vs acute phase reactant to infection    #ID:  -Aspergilloma dx 9/24/17, on voriconazole x 6 weeks per ID recommendations. Recommend continuing voriconazole given hemoptysis recurred after was stopped, possible related to inflammation 2/2 aspergilloma and possible continued infection.  -Recultured and started on antibiotics 11/26 for fevers up to 103F. Afebrile x 24 hours now.  -UA grossly positive, pt grew Klebsiella 100K colonies. Pansensitive. Deescalated zosyn to ceftriaxone.   -Given pt consistently grows staph epi, will c/w vanco, may need JONATAN for evaluation  -Repeat blood cx drawn, pending    #Endocrine:   -On tube feeds, increased yesterday per dietary's recommendations FS q6h, SS,  --172    #DVT PPX: octavio Cantu, PGY3  Emergency Medicine  [id number] 47M with hx of sarcoidosis, started on Eqliuis for cerebrovascular infarct with PFO on echo and paroxysmal afib in September c/b recurrent hemoptysis (Eliquis d/c'd on 9/20, then restarted between 10/23-27 after hemoptysis improved), d/c'd to rehab then returned 11/9 for recurrent hemoptysis requiring intubation and CTICU admission for management of hemoptysis including IR embolization (11/16) and serial bronch (11/17 and 11/20) in CTICU with no active bleeding visualized, now remains intubated with difficulty weaning vent as during CPAP trials pt fails to initiate breaths, unclear etiology. Transferred to MICU from CTICU for continued vent management and attempted weaning. Bedside trach placed 11/24 c/b episode of hypoxia and bleeding around trach site after suctioning, resolved after surgicel and pressure. Has been febrile intermittently, though improving, grew Klebsiella on urine cx, blood cx with staph epi.     # Neuro:  -off sedation, doing well, moving all extremities  -PT for increase mobilization    #Cardiovascular:  -Off pressors  -A-line removed several days ago.     #Respiratory:  -Trach placed 6 days ago at bedside  tachypnea improved in last 24 hours, max RR 24  -L upper anterior pigtail placed with improvement in pulmonary function and moderate re-expansion of the lung.  -Continue bronchodilators and pulmonary toilet  -on predisone 30 mg for sarcoidosis, decrease to 20mg today to continue taper    #GI:  -Increased feeds yesterday  -Has started having bowel movements    #Renal:  -Monitor I's and O's    #Heme:  -H/H stable; baseline Hb 9-10;   -thrombocytopenia improving vs acute phase reactant to infection    #ID:  -Aspergilloma dx 9/24/17, on voriconazole x 6 weeks per ID recommendations. Recommend continuing voriconazole given hemoptysis recurred after was stopped, possible related to inflammation 2/2 aspergilloma and possible continued infection.  -Recultured and started on antibiotics 11/26 for fevers up to 103F. Afebrile x 24 hours now.  -UA grossly positive, pt grew Klebsiella 100K colonies. Pansensitive. Deescalated zosyn to ceftriaxone.   -Given pt consistently grows staph epi, will c/w vanco, may need JONATAN for evaluation  -Repeat blood cx drawn, negative    #Endocrine:   -On tube feeds, increased yesterday per dietary's recommendations FS q6h, SS,  --172    #DVT PPX: octavio Cantu, PGY3  Emergency Medicine  [id number]

## 2017-11-30 NOTE — PROGRESS NOTE ADULT - PROBLEM SELECTOR PLAN 2
intubated: try to titrate as tolerated!  11/18 on ventilator support. Pt is sedated and paralyzed. defer ventilator Management/ weaning per CTICU team.  11/19 still sedated and paralyzed. Plan to wean off Nimbex today per Primary RN. ABGs reviewed.  11/20:ff paralyzing agent! But sedated !!  11/21: awake and alert: responding to simple commands: weaning trials  11/22: currently sedated: ? trach now?  11/23: for trach in AM  11/24: trach today  11/25: S/P trach yesterday: chest x-ray noted: Cont full vent support and wean if tolerates  11/26: [id number] with trach and full mechanical ventilation: Discussed with MICU attending in detail: for rpt ct scan chest  11/28: rpt ct scan c hst reviewed: for possible chest tube on rt side  11/30: on full vent support with weaning trials

## 2017-11-30 NOTE — PROGRESS NOTE ADULT - PROBLEM SELECTOR PLAN 6
controlled.  11/10 stable  11/11 stable  11/12 stable  11/18 off meds. not on pressor  11/19 stable without pressors  11/21: controlled  11/30: off an on pressors: currently off

## 2017-11-30 NOTE — PROGRESS NOTE ADULT - SUBJECTIVE AND OBJECTIVE BOX
HPI 11/13:  Mr. Dickens is a 47 year old M with PMHx of sarcoidosis, HTN, COPD, and asthma. He is known to our service from recent prolonged admission from 9/10/17-11/2/17. He had presented with severe leg spasms. 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 Also + rhino/enterovirus, COPD exacerbation, intubation. 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 Followed by ID, on voriconazole until 11/4. S/p chest tube, blood patch procedure, and eventual pleurodesis for air leak.      Discharged to [facility] on 11/2. Per discussion with [facility], patient able to tolerate therapy, on O2 via NC. Tolerated room air during the daytime for a few hours at rest. CT chest 11/3 at [facility] showed left pneumothorax, small left pleural effusion. Transferred to [facility] 11/8 due to hemoptysis (per report approx 1/4 cup blood in several hours). Eliquis on hold since admission to [facility].  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 CTA chest 11/10--> No active pulmonary hemorrhage. Unchanged left pneumothorax. Unchanged patchy peribronchial opacities and cystic changes, possibly related to patient's known sarcoidosis. Unchanged small left pleural effusion mixed density, possible element of proteinaceous or hemorrhagic fluid. No intervention at this time, per CTS may need bronchoscopy in future. Cardiology recommend systemic AC for Afib, recommend heparin to coumadin bridge, state would not pursue PFO closure.    Interval history 11/27: Required intubation/transfer to CTICU for continued hemoptysis. Patient s/p IR embolization on 11/16, bronch on 11/17 and 11/20. Remained intubated with difficulty weaning off vent. Transferred to MICU. S/p trach 11/24, complicated by hypoxia/bleeding around trach site, resolved. Also episodes of fever, repeat cultures drawn and started on Abx on 11/25-11/26. Patient with noted anxiety when attempted to wean off sedation. Per MICU team patient following commands when sedation weaned. 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 On vanc/ceftriaxone. CT chest 11/27: "Redemonstration of a large left apical pneumothorax with new herniation between the left anterolateral fifth and sixth ribs. New bilateral lower lobe tree-in-bud opacities which may represent   infection. New small right and trace left pleural effusions." Chest tube placed 11/28. Patient with improved tolerance of sedation wean. Seen and examined. Awake, following commands.     FUNCTIONAL PROGRESS  Sat at [facility] yesterday with MICU staff    REVIEW OF SYSTEMS  Constitutional - No fever,  No fatigue  HEENT - No vertigo, No neck pain  Neurological - No headaches, No memory loss, No loss of strength, No numbness, No tremors  Skin - No rashes, No lesions   Musculoskeletal - No joint pain, No joint swelling, No muscle pain  Psychiatric - No depression, No anxiety    VITALS  T(C): 37.7 (11-30-17 @ 08:00), Max: 37.7 (11-29-17 @ 20:00)  HR: 98 (11-30-17 @ 11:18) (95 - 118)  BP: 117/68 (11-30-17 @ 11:00) (103/69 - 137/93)  RR: 14 (11-30-17 @ 11:00) (11 - 23)  SpO2: 100% (11-30-17 @ 11:18) (99% - 100%)  Wt(kg): --    MEDICATIONS   acetaminophen   Tablet 650 milliGRAM(s) every 6 hours PRN  ALBUTerol    90 MICROgram(s) HFA Inhaler 2 Puff(s) every 6 hours  buDESOnide 160 MICROgram(s)/formoterol 4.5 MICROgram(s) Inhaler 2 Puff(s) two times a day  cefTRIAXone   IVPB     cefTRIAXone   IVPB 1 Gram(s) every 24 hours  chlorhexidine 4% Liquid 1 Application(s) daily  enoxaparin Injectable 40 milliGRAM(s) daily  fentaNYL   Patch 100 MICROgram(s)/Hr. 1 Patch every 48 hours  ferrous    sulfate Liquid 300 milliGRAM(s) daily  folic acid 1 milliGRAM(s) daily  insulin lispro (HumaLOG) corrective regimen sliding scale   every 6 hours  ipratropium 17 MICROgram(s) HFA Inhaler 1 Puff(s) every 6 hours  polyethylene glycol 3350 17 Gram(s) at bedtime  predniSONE   Tablet 20 milliGRAM(s) daily  propofol Infusion 50 MICROgram(s)/kG/Min <Continuous>  senna Syrup 10 milliLiter(s) at bedtime  valproic  acid Syrup 500 milliGRAM(s) two times a day  vancomycin  IVPB 1000 milliGRAM(s) every 8 hours  voriconazole IVPB 300 milliGRAM(s) every 12 hours      RECENT LABS/IMAGING  CBC Full  -  ( 30 Nov 2017 03:10 )  WBC Count : 12.87 K/uL  Hemoglobin : 7.8 g/dL  Hematocrit : 25.2 %  Platelet Count - Automated : 138 K/uL  Mean Cell Volume : 98.4 fL  Mean Cell Hemoglobin : 30.5 pg  Mean Cell Hemoglobin Concentration : 31.0 %  Auto Neutrophil # : 9.41 K/uL  Auto Lymphocyte # : 1.30 K/uL  Auto Monocyte # : 2.04 K/uL  Auto Eosinophil # : 0.01 K/uL  Auto Basophil # : 0.01 K/uL  Auto Neutrophil % : 73.0 %  Auto Lymphocyte % : 10.1 %  Auto Monocyte % : 15.9 %  Auto Eosinophil % : 0.1 %  Auto Basophil % : 0.1 %    11-30    145  |  98  |  9   ----------------------------<  126<H>  4.0   |  41<H>  |  0.70    Ca    8.2<L>      30 Nov 2017 03:10  Phos  2.2     11-30  Mg     2.1     11-30    TPro  5.3<L>  /  Alb  2.2<L>  /  TBili  0.2  /  DBili  x   /  AST  18  /  ALT  12  /  AlkPhos  65  11-30    PHYSICAL EXAM  Constitutional - NAD, trach/vent, awake,  Extremities - Edema bilateral UEs (L > R). LLE edema.   Neurologic Exam -    Cognitive - following commands, attempting to speak by mouthing words                Motor - RUE 4/5. LUE 3/5. RLE: HF and KE at least 3/5, PF/DF 4+/5.  LLE: HF and KE 2+/5, PF/DF 4+/5.    Sensory - intact to LT    ASSESSMENT/PLAN  48 y/o M known to our service with recent prolonged admission for bilateral MCA and R PCA CVA, hemoptysis/aspergillosis s/p DAVID thoracotomy, discharged to [facility] and readmitted 11/8 for hemoptysis. Now s/p intubation, s/p trach on 11/24, tolerating sedation wean and able to follow commands. Patient with left hemiparesis, deconditioning, compromised pulmonary status, functional, gait, ADL impairments.    -PT/OT for ROM, strengthening of four extremities. Continue to encourage sitting at EOB; OOB when weaned off sedation.   -S/p trach - Encourage communication board  -Precautions - Falls, Cardiac, pulmonary, seizure  -DVT Prophylaxis - heparin SQ  -Nutrition - NPO/tube feeds   -Disposition - Will follow along patient's progress.

## 2017-11-30 NOTE — PROGRESS NOTE ADULT - SUBJECTIVE AND OBJECTIVE BOX
Patient is a 47y old  Male who presents with a chief complaint of hemoptysis (09 Nov 2017 00:32)  pt remaining on ventilator undergoing weaning trials      Any change in ROS:     MEDICATIONS  (STANDING):  ALBUTerol    90 MICROgram(s) HFA Inhaler 2 Puff(s) Inhalation every 6 hours  buDESOnide 160 MICROgram(s)/formoterol 4.5 MICROgram(s) Inhaler 2 Puff(s) Inhalation two times a day  cefTRIAXone   IVPB      cefTRIAXone   IVPB 1 Gram(s) IV Intermittent every 24 hours  chlorhexidine 4% Liquid 1 Application(s) Topical daily  enoxaparin Injectable 40 milliGRAM(s) SubCutaneous daily  fentaNYL   Patch 100 MICROgram(s)/Hr. 1 Patch Transdermal every 48 hours  ferrous    sulfate Liquid 300 milliGRAM(s) Enteral Tube daily  folic acid 1 milliGRAM(s) Oral daily  insulin lispro (HumaLOG) corrective regimen sliding scale   SubCutaneous every 6 hours  ipratropium 17 MICROgram(s) HFA Inhaler 1 Puff(s) Inhalation every 6 hours  polyethylene glycol 3350 17 Gram(s) Oral at bedtime  predniSONE   Tablet 30 milliGRAM(s) Oral daily  propofol Infusion 50 MICROgram(s)/kG/Min (22.5 mL/Hr) IV Continuous <Continuous>  senna Syrup 10 milliLiter(s) Oral at bedtime  valproic  acid Syrup 500 milliGRAM(s) Oral two times a day  vancomycin  IVPB 1000 milliGRAM(s) IV Intermittent every 8 hours  voriconazole IVPB 300 milliGRAM(s) IV Intermittent every 12 hours    MEDICATIONS  (PRN):  acetaminophen   Tablet 650 milliGRAM(s) Oral every 6 hours PRN For Temp greater than 38 C (100.4 F)    Vital Signs Last 24 Hrs  T(C): 37.7 (30 Nov 2017 08:00), Max: 38.1 (29 Nov 2017 12:00)  T(F): 99.9 (30 Nov 2017 08:00), Max: 100.6 (29 Nov 2017 12:00)  HR: 100 (30 Nov 2017 10:00) (95 - 118)  BP: 107/64 (30 Nov 2017 10:00) (103/69 - 137/93)  BP(mean): 72 (30 Nov 2017 10:00) (72 - 98)  RR: 16 (30 Nov 2017 10:00) (11 - 23)  SpO2: 100% (30 Nov 2017 10:00) (99% - 100%)  Mode: AC/ CMV (Assist Control/ Continuous Mandatory Ventilation)  RR (machine): 12  TV (machine): 500  FiO2: 40  PEEP: 8  MAP: 14  PIP: 29    I&O's Summary    29 Nov 2017 07:01  -  30 Nov 2017 07:00  --------------------------------------------------------  IN: 2633.6 mL / OUT: 2825 mL / NET: -191.4 mL    30 Nov 2017 07:01  -  30 Nov 2017 11:02  --------------------------------------------------------  IN: 416 mL / OUT: 460 mL / NET: -44 mL          Physical Exam:   GENERAL: NAD, well-groomed, well-developed  HEENT: GIOVANI/   Atraumatic, Normocephalic  ENMT: No tonsillar erythema, exudates, or enlargement; Moist mucous membranes, Good dentition, No lesions  NECK: Supple, No JVD, Normal thyroid  CHEST/LUNG: decreased breath sounds with coarse rhonchi  CVS: Regular rate and rhythm; No murmurs, rubs, or gallops  GI: : Soft, Nontender, Nondistended; Bowel sounds present  NERVOUS SYSTEM:  awake  EXTREMITIES:  2+ Peripheral Pulses, No clubbing, cyanosis, or edema  LYMPH: No lymphadenopathy noted  SKIN: No rashes or lesions  ENDOCRINOLOGY: No Thyromegaly  PSYCH: Appropriate    Labs:  ABG - ( 29 Nov 2017 03:30 )  pH: 7.43  /  pCO2: 66    /  pO2: 155   / HCO3: 41    / Base Excess: 17.1  /  SaO2: 99.6                            7.8    12.87 )-----------( 138      ( 30 Nov 2017 03:10 )             25.2                         7.2    9.43  )-----------( 137      ( 29 Nov 2017 03:30 )             22.3                         7.2    10.80 )-----------( 111      ( 28 Nov 2017 02:50 )             22.8                         7.9    13.03 )-----------( 114      ( 27 Nov 2017 03:46 )             25.1     11-30    145  |  98  |  9   ----------------------------<  126<H>  4.0   |  41<H>  |  0.70  11-29    139  |  94<L>  |  12  ----------------------------<  138<H>  4.1   |  38<H>  |  0.57  11-28    142  |  95<L>  |  12  ----------------------------<  144<H>  4.3   |  40<H>  |  0.73  11-27    141  |  95<L>  |  10  ----------------------------<  108<H>  4.8   |  39<H>  |  0.66    Ca    8.2<L>      30 Nov 2017 03:10  Ca    8.0<L>      29 Nov 2017 03:30  Phos  2.2     11-30  Phos  2.4     11-29  Mg     2.1     11-30  Mg     2.2     11-29    TPro  5.3<L>  /  Alb  2.2<L>  /  TBili  0.2  /  DBili  x   /  AST  18  /  ALT  12  /  AlkPhos  65  11-30  TPro  5.4<L>  /  Alb  2.4<L>  /  TBili  0.2  /  DBili  x   /  AST  12  /  ALT  8   /  AlkPhos  61  11-28  TPro  5.6<L>  /  Alb  2.5<L>  /  TBili  0.3  /  DBili  x   /  AST  15  /  ALT  9   /  AlkPhos  67  11-27    CAPILLARY BLOOD GLUCOSE      POCT Blood Glucose.: 100 mg/dL (30 Nov 2017 05:27)  POCT Blood Glucose.: 120 mg/dL (30 Nov 2017 00:29)  POCT Blood Glucose.: 104 mg/dL (29 Nov 2017 17:10)  POCT Blood Glucose.: 92 mg/dL (29 Nov 2017 11:37)      LIVER FUNCTIONS - ( 30 Nov 2017 03:10 )  Alb: 2.2 g/dL / Pro: 5.3 g/dL / ALK PHOS: 65 u/L / ALT: 12 u/L / AST: 18 u/L / GGT: x               Cultures:     Vancomycin Trough:     11-29 @ 13:20  16.3    Gentamicin:          11-29 @ 13:20  --    Vancomycin Trough:     11-28 @ 02:50  10.0    Gentamicin:          11-28 @ 02:50  --      Blood Culture:           11-25 @ 15:47  Organism Klebsiella pneumoniae  Gram stain --    Blood Culture:           11-25 @ 12:46  Organism Staphylococcus epidermidis  Gram stain --      Urine Culture:          11-25 @ 15:47  Organism Klebsiella pneumoniae    Urine Culture:          11-25 @ 12:46  Organism Staphylococcus epidermidis      Wound culture:                11-29 @ 04:37  Organism --  Culture w/ gram stain --  Specimen Source BLOOD    Wound culture:                11-26 @ 22:40  Organism --  Culture w/ gram stain --  Specimen Source BLOOD    Wound culture:                11-25 @ 15:47  Organism Klebsiella pneumoniae  Culture w/ gram stain --  Specimen Source URINE CATHETER    Wound culture:                11-25 @ 12:46  Organism Staphylococcus epidermidis  Culture w/ gram stain --  Specimen Source BLOOD    Wound culture:                11-23 @ 17:05  Organism --  Culture w/ gram stain --  Specimen Source TRACHEAL ASPIRATE      Abscess culture:             11-29 @ 04:37  Organism --  Gram Stain --  Specimen Source BLOOD    Abscess culture:             11-26 @ 22:40  Organism --  Gram Stain --  Specimen Source BLOOD    Abscess culture:             11-25 @ 15:47  Organism Klebsiella pneumoniae  Gram Stain --  Specimen Source URINE CATHETER    Abscess culture:             11-25 @ 12:46  Organism Staphylococcus epidermidis  Gram Stain --  Specimen Source BLOOD    Abscess culture:             11-23 @ 17:05  Organism --  Gram Stain --  Specimen Source TRACHEAL ASPIRATE      CSF:              11-25 @ 15:47  Organism Klebsiella pneumoniae  Gram Stain --    CSF:              11-25 @ 12:46  Organism Staphylococcus epidermidis  Gram Stain --      Tissue culture:           11-29 @ 04:37  Organism --  Gram Stain --  Specimen Source BLOOD    Tissue culture:           11-26 @ 22:40  Organism --  Gram Stain --  Specimen Source BLOOD    Tissue culture:           11-25 @ 15:47  Organism Klebsiella pneumoniae  Gram Stain --  Specimen Source URINE CATHETER    Tissue culture:           11-25 @ 12:46  Organism Staphylococcus epidermidis  Gram Stain --  Specimen Source BLOOD    Tissue culture:           11-23 @ 17:05  Organism --  Gram Stain --  Specimen Source TRACHEAL ASPIRATE      Body Fluid Smear & Culture:                        11-29 @ 04:37  AFB Smear  --  Culture Acid Fast Body Fluid w/ Smear  --  Culture Acid Fast Smear Concentrated   --    Culture Results:     --  Specimen Source BLOOD    Body Fluid Smear & Culture:                        11-26 @ 22:40  AFB Smear  --  Culture Acid Fast Body Fluid w/ Smear  --  Culture Acid Fast Smear Concentrated   --    Culture Results:     --  Specimen Source BLOOD    Body Fluid Smear & Culture:                        11-25 @ 15:47  AFB Smear  --  Culture Acid Fast Body Fluid w/ Smear  --  Culture Acid Fast Smear Concentrated   --    Culture Results:     --  Specimen Source URINE CATHETER    Body Fluid Smear & Culture:                        11-25 @ 12:46  AFB Smear  --  Culture Acid Fast Body Fluid w/ Smear  --  Culture Acid Fast Smear Concentrated   --    Culture Results:     --  Specimen Source BLOOD    Body Fluid Smear & Culture:                        11-23 @ 17:05  AFB Smear  --  Culture Acid Fast Body Fluid w/ Smear  --  Culture Acid Fast Smear Concentrated   --    Culture Results:     --  Specimen Source TRACHEAL ASPIRATE              Studies  Chest X-RAY  CT SCAN Chest   Venous Dopplers: LE:   CT Abdomen  Others    < from: Xray Chest 1 View AP- PORTABLE-Urgent (11.29.17 @ 14:49) >    ******PRELIMINARY REPORT******    ******PRELIMINARY REPORT******            EXAM:  RAD CHEST PORTABLE URGENT        PROCEDURE DATE:  Nov 29 2017     ******PRELIMINARY REPORT******    ******PRELIMINARY REPORT******            INTERPRETATION:  OA            ******PRELIMINARY REPORT******    ******PRELIMINARY REPORT******          TARA RODRIGUEZ M.D., RADIOLOGY RESIDENT    < end of copied text >      < from: Xray Chest 1 View AP-PORTABLE IMMEDIATE (11.28.17 @ 18:04) >  ******PRELIMINARY REPORT******    ******PRELIMINARY REPORT******            EXAM:  RAD CHEST PORTABLE IMMEDIATE        PROCEDURE DATE:  Nov 28 2017     ******PRELIMINARY REPORT******    ******PRELIMINARY REPORT******            INTERPRETATION:  Moderate left pneumothorax, however slightly decreased   in size compared to the prior CT.            ******PRELIMINARY REPORT******    ******PRELIMINARY REPORT******          PAT SANTIAGO M.D., RADIOLOGY RESIDENT        < end of copied text >

## 2017-11-30 NOTE — PROGRESS NOTE ADULT - PROBLEM SELECTOR PLAN 1
now spiked to fever:  on zosyn, vancomycin, as well as voriconazole~  id FOLLOW UP  11/27: Cont antibiotics::  11/28: cont current treatment  11/30: continues to be on broad spectrum antibiotics:

## 2017-11-30 NOTE — PROGRESS NOTE ADULT - PROBLEM SELECTOR PLAN 4
Cont BD with inhaled steroids and oral steroids  11/114: Pts wheezing has significantly improved: he will need long term steroids as he starts wheezing once his steroids are dced!  11/15: Wheezing has increased: started on IV steroids high dose by thoracic team: Would suggest to decrease to 20 mg three times a day !  11/16: decrease steroids today to 20 mg three times day  11/17: try to decrease steroids in AM  11/18 Duoneb. intubated for acute respiratory distress with hypoxia  11/19 KIARA. intubated and on ventilator support  11/20: cont vent support: start weaning as tolerated!  11/21: would decrease  his steroids to 30 mg a day from tomorrow  11/22: DECREASE STEROIDS: HE WHEEZES WHEN THE STEROIDS ARE TAPERED: BUT NEED TO BE LOWERED NOW AND WATCH!!  11/23: on 40 mg of prednisone: try to decrease and taper  11/24: prednisone decreased to 30 ,g a day !!  11/25: on 30 mg a day now:  11/27: on steroids: for repeat ct scan chest today  11/28: ct reviewed with [facility] attending  11/30: on bronchodilators and steroids: weanig trials

## 2017-11-30 NOTE — PROGRESS NOTE ADULT - PROBLEM SELECTOR PLAN 5
on prednisone.  11/10 continue steroid  11/11 on steroid  11/12 on oral steroid  11/13: cont oral steroids!!  11/14: Has pretty poor lungs secondary to steroids: Cont current therapy!!  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  11/27: cont on steroids  11/30: om 30 mg of prednisone

## 2017-11-30 NOTE — PROGRESS NOTE ADULT - SUBJECTIVE AND OBJECTIVE BOX
Follow Up:      Inverval History/ROS:Patient is a 47y old  Male who presents with a chief complaint of hemoptysis (09 Nov 2017 00:32)    Trach. Afebrile.       Allergies    No Known Allergies    Intolerances        ANTIMICROBIALS:  cefTRIAXone   IVPB    cefTRIAXone   IVPB 1 every 24 hours  vancomycin  IVPB 1000 every 8 hours  voriconazole IVPB 300 every 12 hours      OTHER MEDS:  acetaminophen   Tablet 650 milliGRAM(s) Oral every 6 hours PRN  ALBUTerol    90 MICROgram(s) HFA Inhaler 2 Puff(s) Inhalation every 6 hours  buDESOnide 160 MICROgram(s)/formoterol 4.5 MICROgram(s) Inhaler 2 Puff(s) Inhalation two times a day  chlorhexidine 4% Liquid 1 Application(s) Topical daily  enoxaparin Injectable 40 milliGRAM(s) SubCutaneous daily  fentaNYL   Patch 100 MICROgram(s)/Hr. 1 Patch Transdermal every 48 hours  ferrous    sulfate Liquid 300 milliGRAM(s) Enteral Tube daily  folic acid 1 milliGRAM(s) Oral daily  insulin lispro (HumaLOG) corrective regimen sliding scale   SubCutaneous every 6 hours  ipratropium 17 MICROgram(s) HFA Inhaler 1 Puff(s) Inhalation every 6 hours  polyethylene glycol 3350 17 Gram(s) Oral at bedtime  predniSONE   Tablet 20 milliGRAM(s) Oral daily  propofol Infusion 50 MICROgram(s)/kG/Min IV Continuous <Continuous>  senna Syrup 10 milliLiter(s) Oral at bedtime  valproic  acid Syrup 500 milliGRAM(s) Oral two times a day      Vital Signs Last 24 Hrs  T(C): 37.7 (30 Nov 2017 08:00), Max: 37.7 (29 Nov 2017 20:00)  T(F): 99.9 (30 Nov 2017 08:00), Max: 99.9 (30 Nov 2017 08:00)  HR: 101 (30 Nov 2017 12:00) (95 - 118)  BP: 106/66 (30 Nov 2017 12:00) (103/69 - 137/93)  BP(mean): 75 (30 Nov 2017 12:00) (72 - 98)  RR: 15 (30 Nov 2017 12:00) (11 - 23)  SpO2: 100% (30 Nov 2017 12:00) (99% - 100%)    PHYSICAL EXAM:  General: [x ] trach  HEAD/EYES: [ ] PERRL [x ] white sclera [ ] icterus  ENT:  [ ] normal [x ] supple [ ] thrush [ ] pharyngeal exudate  Cardiovascular:   [ ] murmur [ ] normal [ ] PPM/AICD  Respiratory:  [x ] clear to ausculation bilaterally  GI:  [x ] soft, non-tender, normal bowel sounds  :  [ ] mock [ ] no CVA tenderness   Musculoskeletal:  [ ] no synovitis  Neurologic:  [ ] non-focal exam   Skin:  [ ] no rash  Lymph: [ ] no lymphadenopathy  Psychiatric:  [ ] appropriate affect [ ] alert & oriented  Lines:  x[ ] no phlebitis [ ] central line                                7.8    12.87 )-----------( 138      ( 30 Nov 2017 03:10 )             25.2       11-30    145  |  98  |  9   ----------------------------<  126<H>  4.0   |  41<H>  |  0.70    Ca    8.2<L>      30 Nov 2017 03:10  Phos  2.2     11-30  Mg     2.1     11-30    TPro  5.3<L>  /  Alb  2.2<L>  /  TBili  0.2  /  DBili  x   /  AST  18  /  ALT  12  /  AlkPhos  65  11-30          MICROBIOLOGY:Culture - Respiratory:   Normal Respiratory Katherine  Present  FEW  FEW (11-23-17 @ 17:05)      RADIOLOGY:

## 2017-11-30 NOTE — PROGRESS NOTE ADULT - ATTENDING COMMENTS
pt doing poorly  paralyzed, sedated but not requiring high FIO2 :   no bleeding noted:  try to wean sedation as well as paralyzing agent   Steroids have been decreased: goal would be rapidly tapered down steroids to minimum possible given h e is on sedation and paralyzing agent: But per reports pt gets very agitated when taken of paralyzing agent!    11/20: off paralyzing agent; cont current care: may decrease steroids to 30 mg in a day or two    11/21: off sedation : alert and awake and responds to simple commands; weaning trials!!  11/28: ct reviewed: lung herniation noted: for possible chest tube today:   11/23: continues to be on ventilator: failed weaning attempts multiple times: for trach now: prognosis guarded!!  11/24: stable" but critical!!  11/25: NEW FEVER: PANCULTURE AS WELL AS EMPIRIC ANTIBIOTICS:  11/30: stable: cont current care:

## 2017-11-30 NOTE — PROGRESS NOTE ADULT - PROBLEM SELECTOR PLAN 3
? 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 ot from bronchiectasis? eliquis have been dced: needs card to se: In addition, he may need IR help with embolization, it his hemoptysis increases: He has had pleurodesis on the left side.  11/10 quantify hemoptysis. no AC. stable  11/11 no more hemoptysis. off AC  11/12 resolved. off AC  11/13: today had hemoptysis again----->localize site---> IR embolization: left a message for thoracic team  11/14: DW Dr Caldera: for repeat bronchoscopy to localize the site of bleeding and need IR help to embolize the culprit vessel!  11/15: started on broad spectrum antibiotics and for bronchoscopy today  11/16: s/p IR emobolization done: monitor for more hemoptysis  11/17: s/p bronchsocopy: reportedly old blood seen in airways!  11/18 s/p bronchoscopy in ICU via ETT. old clot. no active bleeding  11/19 stable. no active bleeding per recent bronchoscopy  11/20; off AC at this time  11/22: resolved: remained off ac  11/23: remained off AC: has hx of PAF  11/24: remains off AC  11/27: no bleeding noted  11/30: no recent  hemoptysis

## 2017-11-30 NOTE — PROGRESS NOTE ADULT - SUBJECTIVE AND OBJECTIVE BOX
CHIEF COMPLAINT:    Interval Events:    REVIEW OF SYSTEMS:  Constitutional: [ ] negative [ ] fevers [ ] chills [ ] weight loss [ ] weight gain  HEENT: [ ] negative [ ] dry eyes [ ] eye irritation [ ] postnasal drip [ ] nasal congestion  CV: [ ] negative  [ ] chest pain [ ] orthopnea [ ] palpitations [ ] murmur  Resp: [ ] negative [ ] cough [ ] shortness of breath [ ] dyspnea [ ] wheezing [ ] sputum [ ] hemoptysis  GI: [ ] negative [ ] nausea [ ] vomiting [ ] diarrhea [ ] constipation [ ] abd pain [ ] dysphagia   : [ ] negative [ ] dysuria [ ] nocturia [ ] hematuria [ ] increased urinary frequency  Musculoskeletal: [ ] negative [ ] back pain [ ] myalgias [ ] arthralgias [ ] fracture  Skin: [ ] negative [ ] rash [ ] itch  Neurological: [ ] negative [ ] headache [ ] dizziness [ ] syncope [ ] weakness [ ] numbness  Psychiatric: [ ] negative [ ] anxiety [ ] depression  Endocrine: [ ] negative [ ] diabetes [ ] thyroid problem  Hematologic/Lymphatic: [ ] negative [ ] anemia [ ] bleeding problem  Allergic/Immunologic: [ ] negative [ ] itchy eyes [ ] nasal discharge [ ] hives [ ] angioedema  [ ] All other systems negative  [ ] Unable to assess ROS because ________    OBJECTIVE:  ICU Vital Signs Last 24 Hrs  T(C): 37.4 (30 Nov 2017 04:00), Max: 38.1 (29 Nov 2017 12:00)  T(F): 99.4 (30 Nov 2017 04:00), Max: 100.6 (29 Nov 2017 12:00)  HR: 106 (30 Nov 2017 07:00) (95 - 118)  BP: 117/69 (30 Nov 2017 07:00) (102/67 - 137/93)  BP(mean): 76 (30 Nov 2017 07:00) (74 - 98)  ABP: --  ABP(mean): --  RR: 13 (30 Nov 2017 07:00) (11 - 23)  SpO2: 100% (30 Nov 2017 07:00) (99% - 100%)    Mode: AC/ CMV (Assist Control/ Continuous Mandatory Ventilation), RR (machine): 12, TV (machine): 500, FiO2: 40, PEEP: 8, MAP: 14, PIP: 29    11-29 @ 07:01  -  11-30 @ 07:00  --------------------------------------------------------  IN: 2633.6 mL / OUT: 2825 mL / NET: -191.4 mL      CAPILLARY BLOOD GLUCOSE      POCT Blood Glucose.: 100 mg/dL (30 Nov 2017 05:27)      PHYSICAL EXAM:  General:   HEENT:   Lymph Nodes:  Neck:   Respiratory:   Cardiovascular:   Abdomen:   Extremities:   Skin:   Neurological:  Psychiatry:    LINES:    HOSPITAL MEDICATIONS:  Standing Meds:  ALBUTerol    90 MICROgram(s) HFA Inhaler 2 Puff(s) Inhalation every 6 hours  buDESOnide 160 MICROgram(s)/formoterol 4.5 MICROgram(s) Inhaler 2 Puff(s) Inhalation two times a day  cefTRIAXone   IVPB      cefTRIAXone   IVPB 1 Gram(s) IV Intermittent every 24 hours  chlorhexidine 4% Liquid 1 Application(s) Topical daily  enoxaparin Injectable 40 milliGRAM(s) SubCutaneous daily  fentaNYL   Patch 100 MICROgram(s)/Hr. 1 Patch Transdermal every 48 hours  ferrous    sulfate 325 milliGRAM(s) Oral three times a day with meals  folic acid 1 milliGRAM(s) Oral daily  insulin lispro (HumaLOG) corrective regimen sliding scale   SubCutaneous every 6 hours  ipratropium 17 MICROgram(s) HFA Inhaler 1 Puff(s) Inhalation every 6 hours  polyethylene glycol 3350 17 Gram(s) Oral at bedtime  predniSONE   Tablet 30 milliGRAM(s) Oral daily  propofol Infusion 50 MICROgram(s)/kG/Min IV Continuous <Continuous>  senna Syrup 10 milliLiter(s) Oral at bedtime  valproic  acid Syrup 500 milliGRAM(s) Oral two times a day  vancomycin  IVPB 1000 milliGRAM(s) IV Intermittent every 8 hours  voriconazole IVPB 300 milliGRAM(s) IV Intermittent every 12 hours      PRN Meds:  acetaminophen   Tablet 650 milliGRAM(s) Oral every 6 hours PRN  LORazepam   Injectable 1 milliGRAM(s) IV Push every 4 hours PRN      LABS:                        7.8    12.87 )-----------( 138      ( 30 Nov 2017 03:10 )             25.2     Hgb Trend: 7.8<--, 7.2<--, 7.2<--, 7.9<--, 7.8<--  11-30    145  |  98  |  9   ----------------------------<  126<H>  4.0   |  41<H>  |  0.70    Ca    8.2<L>      30 Nov 2017 03:10  Phos  2.2     11-30  Mg     2.1     11-30    TPro  5.3<L>  /  Alb  2.2<L>  /  TBili  0.2  /  DBili  x   /  AST  18  /  ALT  12  /  AlkPhos  65  11-30    Creatinine Trend: 0.70<--, 0.57<--, 0.73<--, 0.66<--, 0.72<--, 0.65<--      Arterial Blood Gas:  11-29 @ 03:30  7.43/66/155/41/99.6/17.1  ABG lactate: 1.1  Arterial Blood Gas:  11-28 @ 13:09  7.47/57/136/39/99.7/15.5  ABG lactate: --        MICROBIOLOGY:     Culture - Blood (collected 29 Nov 2017 04:37)  Source: BLOOD  Preliminary Report (30 Nov 2017 04:37):    NO ORGANISMS ISOLATED    NO ORGANISMS ISOLATED AT 24 HOURS      RADIOLOGY:  [ ] Reviewed and interpreted by me    EKG: CHIEF COMPLAINT: Hemoptysis    Interval Events: Able to take off sedation and dangle at bedside yesterday, feeds increased yesterday    REVIEW OF SYSTEMS:  Constitutional: [x ] negative [ ] fevers [ ] chills [ ] weight loss [ ] weight gain  HEENT: [x ] negative [ ] dry eyes [ ] eye irritation [ ] postnasal drip [ ] nasal congestion  CV: [x ] negative  [ ] chest pain [ ] orthopnea [ ] palpitations [ ] murmur  Resp: [ ] negative [ ] cough [x ] shortness of breath [ ] dyspnea [ ] wheezing [ ] sputum [ ] hemoptysis  GI: [x ] negative [ ] nausea [ ] vomiting [ ] diarrhea [ ] constipation [ ] abd pain [ ] dysphagia   : [x ] negative [ ] dysuria [ ] nocturia [ ] hematuria [ ] increased urinary frequency  Musculoskeletal: [ x] negative [ ] back pain [ ] myalgias [ ] arthralgias [ ] fracture  Skin: [x ] negative [ ] rash [ ] itch  Neurological: [ x] negative [ ] headache [ ] dizziness [ ] syncope [ ] weakness [ ] numbness  Psychiatric: [x ] negative [ ] anxiety [ ] depression  Endocrine: [ ] negative [ ] diabetes [ ] thyroid problem  Hematologic/Lymphatic: [ ] negative [ ] anemia [ ] bleeding problem  Allergic/Immunologic: [ ] negative [ ] itchy eyes [ ] nasal discharge [ ] hives [ ] angioedema  [ ] All other systems negative  [ ] Unable to assess ROS because ________    OBJECTIVE:  ICU Vital Signs Last 24 Hrs  T(C): 37.4 (30 Nov 2017 04:00), Max: 38.1 (29 Nov 2017 12:00)  T(F): 99.4 (30 Nov 2017 04:00), Max: 100.6 (29 Nov 2017 12:00)  HR: 106 (30 Nov 2017 07:00) (95 - 118)  BP: 117/69 (30 Nov 2017 07:00) (102/67 - 137/93)  BP(mean): 76 (30 Nov 2017 07:00) (74 - 98)  ABP: --  ABP(mean): --  RR: 13 (30 Nov 2017 07:00) (11 - 23)  SpO2: 100% (30 Nov 2017 07:00) (99% - 100%)    Mode: AC/ CMV (Assist Control/ Continuous Mandatory Ventilation), RR (machine): 12, TV (machine): 500, FiO2: 40, PEEP: 8, MAP: 14, PIP: 29    11-29 @ 07:01  -  11-30 @ 07:00  --------------------------------------------------------  IN: 2633.6 mL / OUT: 2825 mL / NET: -191.4 mL      CAPILLARY BLOOD GLUCOSE      POCT Blood Glucose.: 100 mg/dL (30 Nov 2017 05:27)      PHYSICAL EXAM:  General: alert, following commands, generally appears improved from yesterday  HEENT: PERRLA, trach in place  Lymph Nodes: no adenopathy  Neck: supple, trach in place  Respiratory: b/l rhonchi (baseline)  Cardiovascular: tachycardic, no MRG  Abdomen: soft, NTND  Extremities: warm, well perfused, no edema  Skin: intact, no rashes  Neurological: alert, moves all extremities, able to follow commands  Psychiatry: normal    LINES: PIV, KO tube, trach    HOSPITAL MEDICATIONS:  Standing Meds:  ALBUTerol    90 MICROgram(s) HFA Inhaler 2 Puff(s) Inhalation every 6 hours  buDESOnide 160 MICROgram(s)/formoterol 4.5 MICROgram(s) Inhaler 2 Puff(s) Inhalation two times a day  cefTRIAXone   IVPB      cefTRIAXone   IVPB 1 Gram(s) IV Intermittent every 24 hours  chlorhexidine 4% Liquid 1 Application(s) Topical daily  enoxaparin Injectable 40 milliGRAM(s) SubCutaneous daily  fentaNYL   Patch 100 MICROgram(s)/Hr. 1 Patch Transdermal every 48 hours  ferrous    sulfate 325 milliGRAM(s) Oral three times a day with meals  folic acid 1 milliGRAM(s) Oral daily  insulin lispro (HumaLOG) corrective regimen sliding scale   SubCutaneous every 6 hours  ipratropium 17 MICROgram(s) HFA Inhaler 1 Puff(s) Inhalation every 6 hours  polyethylene glycol 3350 17 Gram(s) Oral at bedtime  predniSONE   Tablet 30 milliGRAM(s) Oral daily  propofol Infusion 50 MICROgram(s)/kG/Min IV Continuous <Continuous>  senna Syrup 10 milliLiter(s) Oral at bedtime  valproic  acid Syrup 500 milliGRAM(s) Oral two times a day  vancomycin  IVPB 1000 milliGRAM(s) IV Intermittent every 8 hours  voriconazole IVPB 300 milliGRAM(s) IV Intermittent every 12 hours      PRN Meds:  acetaminophen   Tablet 650 milliGRAM(s) Oral every 6 hours PRN  LORazepam   Injectable 1 milliGRAM(s) IV Push every 4 hours PRN      LABS:                        7.8    12.87 )-----------( 138      ( 30 Nov 2017 03:10 )             25.2     Hgb Trend: 7.8<--, 7.2<--, 7.2<--, 7.9<--, 7.8<--  11-30    145  |  98  |  9   ----------------------------<  126<H>  4.0   |  41<H>  |  0.70    Ca    8.2<L>      30 Nov 2017 03:10  Phos  2.2     11-30  Mg     2.1     11-30    TPro  5.3<L>  /  Alb  2.2<L>  /  TBili  0.2  /  DBili  x   /  AST  18  /  ALT  12  /  AlkPhos  65  11-30    Creatinine Trend: 0.70<--, 0.57<--, 0.73<--, 0.66<--, 0.72<--, 0.65<--      Arterial Blood Gas:  11-29 @ 03:30  7.43/66/155/41/99.6/17.1  ABG lactate: 1.1  Arterial Blood Gas:  11-28 @ 13:09  7.47/57/136/39/99.7/15.5  ABG lactate: --        MICROBIOLOGY:     Culture - Blood (collected 29 Nov 2017 04:37)  Source: BLOOD  Preliminary Report (30 Nov 2017 04:37):    NO ORGANISMS ISOLATED    NO ORGANISMS ISOLATED AT 24 HOURS      RADIOLOGY:  [ ] Reviewed and interpreted by me    EKG:

## 2017-11-30 NOTE — PROGRESS NOTE ADULT - PROBLEM SELECTOR PLAN 7
s/p pleurodesis.  11/10 s/p pleurodesis. Management defer to CTS  11/11 no intervention for now. per CTS.  11/13: has mod pneumothorax stable: defer to thoracic for any intervention!  11/12 no intervention planned. bronchoscopy in future per CTS  11/14: Pt still has left sided pneumothorax: s/p blood patch as well as the talc pleurodesis on the left side: Hard to do any other intervention : would defer to thoracic surgical team!!  11/18 management per CTS. not plan for Chest tube at this moment.  11/19 stable radiographically and clinically. Management defer to CTS  11/22: Has this air space in the left lower lobe following DAVID lobectomy: ? PTX, empty space ? any intervention would be difficult: would defer to ct surgery!!  11/27/2017: for rpt ct scan chest now  1130: s/p chest tube placement on left side: yesterdays x-ray no full lung reexpansion:

## 2017-12-01 LAB
ALBUMIN SERPL ELPH-MCNC: 2.3 G/DL — LOW (ref 3.3–5)
ALBUMIN SERPL ELPH-MCNC: 2.3 G/DL — LOW (ref 3.3–5)
ALP SERPL-CCNC: 66 U/L — SIGNIFICANT CHANGE UP (ref 40–120)
ALP SERPL-CCNC: 66 U/L — SIGNIFICANT CHANGE UP (ref 40–120)
ALT FLD-CCNC: 12 U/L — SIGNIFICANT CHANGE UP (ref 4–41)
ALT FLD-CCNC: 12 U/L — SIGNIFICANT CHANGE UP (ref 4–41)
APPEARANCE UR: SIGNIFICANT CHANGE UP
AST SERPL-CCNC: 18 U/L — SIGNIFICANT CHANGE UP (ref 4–40)
AST SERPL-CCNC: 18 U/L — SIGNIFICANT CHANGE UP (ref 4–40)
BASE EXCESS BLDA CALC-SCNC: 16.7 MMOL/L — SIGNIFICANT CHANGE UP
BASOPHILS # BLD AUTO: 0.02 K/UL — SIGNIFICANT CHANGE UP (ref 0–0.2)
BASOPHILS NFR BLD AUTO: 0.1 % — SIGNIFICANT CHANGE UP (ref 0–2)
BILIRUB SERPL-MCNC: 0.2 MG/DL — SIGNIFICANT CHANGE UP (ref 0.2–1.2)
BILIRUB SERPL-MCNC: 0.2 MG/DL — SIGNIFICANT CHANGE UP (ref 0.2–1.2)
BILIRUB UR-MCNC: NEGATIVE — SIGNIFICANT CHANGE UP
BLOOD UR QL VISUAL: NEGATIVE — SIGNIFICANT CHANGE UP
BUN SERPL-MCNC: 7 MG/DL — SIGNIFICANT CHANGE UP (ref 7–23)
BUN SERPL-MCNC: 7 MG/DL — SIGNIFICANT CHANGE UP (ref 7–23)
C DIFF TOX GENS STL QL NAA+PROBE: SIGNIFICANT CHANGE UP
CALCIUM SERPL-MCNC: 8.6 MG/DL — SIGNIFICANT CHANGE UP (ref 8.4–10.5)
CALCIUM SERPL-MCNC: 8.6 MG/DL — SIGNIFICANT CHANGE UP (ref 8.4–10.5)
CHLORIDE BLDA-SCNC: 94 MMOL/L — LOW (ref 96–108)
CHLORIDE SERPL-SCNC: 97 MMOL/L — LOW (ref 98–107)
CHLORIDE SERPL-SCNC: 97 MMOL/L — LOW (ref 98–107)
CO2 SERPL-SCNC: 40 MMOL/L — HIGH (ref 22–31)
CO2 SERPL-SCNC: 40 MMOL/L — HIGH (ref 22–31)
COLOR SPEC: YELLOW — SIGNIFICANT CHANGE UP
CREAT SERPL-MCNC: 0.74 MG/DL — SIGNIFICANT CHANGE UP (ref 0.5–1.3)
CREAT SERPL-MCNC: 0.74 MG/DL — SIGNIFICANT CHANGE UP (ref 0.5–1.3)
EOSINOPHIL # BLD AUTO: 0 K/UL — SIGNIFICANT CHANGE UP (ref 0–0.5)
EOSINOPHIL NFR BLD AUTO: 0 % — SIGNIFICANT CHANGE UP (ref 0–6)
GLUCOSE BLDA-MCNC: 90 MG/DL — SIGNIFICANT CHANGE UP (ref 70–99)
GLUCOSE BLDC GLUCOMTR-MCNC: 122 MG/DL — HIGH (ref 70–99)
GLUCOSE BLDC GLUCOMTR-MCNC: 155 MG/DL — HIGH (ref 70–99)
GLUCOSE BLDC GLUCOMTR-MCNC: 163 MG/DL — HIGH (ref 70–99)
GLUCOSE BLDC GLUCOMTR-MCNC: 86 MG/DL — SIGNIFICANT CHANGE UP (ref 70–99)
GLUCOSE SERPL-MCNC: 96 MG/DL — SIGNIFICANT CHANGE UP (ref 70–99)
GLUCOSE SERPL-MCNC: 96 MG/DL — SIGNIFICANT CHANGE UP (ref 70–99)
GLUCOSE UR-MCNC: NEGATIVE — SIGNIFICANT CHANGE UP
HCO3 BLDA-SCNC: 40 MMOL/L — HIGH (ref 22–26)
HCT VFR BLD CALC: 23.3 % — LOW (ref 39–50)
HCT VFR BLDA CALC: 23.2 % — LOW (ref 39–51)
HGB BLD-MCNC: 7.4 G/DL — LOW (ref 13–17)
HGB BLDA-MCNC: 7.4 G/DL — LOW (ref 13–17)
IMM GRANULOCYTES # BLD AUTO: 0.33 # — SIGNIFICANT CHANGE UP
IMM GRANULOCYTES NFR BLD AUTO: 1.4 % — SIGNIFICANT CHANGE UP (ref 0–1.5)
KETONES UR-MCNC: NEGATIVE — SIGNIFICANT CHANGE UP
LACTATE BLDA-SCNC: 0.8 MMOL/L — SIGNIFICANT CHANGE UP (ref 0.5–2)
LEUKOCYTE ESTERASE UR-ACNC: HIGH
LYMPHOCYTES # BLD AUTO: 1.47 K/UL — SIGNIFICANT CHANGE UP (ref 1–3.3)
LYMPHOCYTES # BLD AUTO: 6.1 % — LOW (ref 13–44)
MAGNESIUM SERPL-MCNC: 2.1 MG/DL — SIGNIFICANT CHANGE UP (ref 1.6–2.6)
MCHC RBC-ENTMCNC: 31.4 PG — SIGNIFICANT CHANGE UP (ref 27–34)
MCHC RBC-ENTMCNC: 31.8 % — LOW (ref 32–36)
MCV RBC AUTO: 98.7 FL — SIGNIFICANT CHANGE UP (ref 80–100)
MONOCYTES # BLD AUTO: 2.05 K/UL — HIGH (ref 0–0.9)
MONOCYTES NFR BLD AUTO: 8.5 % — SIGNIFICANT CHANGE UP (ref 2–14)
MUCOUS THREADS # UR AUTO: SIGNIFICANT CHANGE UP
NEUTROPHILS # BLD AUTO: 20.18 K/UL — HIGH (ref 1.8–7.4)
NEUTROPHILS NFR BLD AUTO: 83.9 % — HIGH (ref 43–77)
NITRITE UR-MCNC: NEGATIVE — SIGNIFICANT CHANGE UP
NRBC # FLD: 0 — SIGNIFICANT CHANGE UP
PCO2 BLDA: 62 MMHG — HIGH (ref 35–48)
PH BLDA: 7.45 PH — SIGNIFICANT CHANGE UP (ref 7.35–7.45)
PH UR: 7 — SIGNIFICANT CHANGE UP (ref 4.6–8)
PHOSPHATE SERPL-MCNC: 3.5 MG/DL — SIGNIFICANT CHANGE UP (ref 2.5–4.5)
PLATELET # BLD AUTO: 185 K/UL — SIGNIFICANT CHANGE UP (ref 150–400)
PMV BLD: 12.8 FL — SIGNIFICANT CHANGE UP (ref 7–13)
PO2 BLDA: 129 MMHG — HIGH (ref 83–108)
POTASSIUM BLDA-SCNC: 3.5 MMOL/L — SIGNIFICANT CHANGE UP (ref 3.4–4.5)
POTASSIUM SERPL-MCNC: 3.8 MMOL/L — SIGNIFICANT CHANGE UP (ref 3.5–5.3)
POTASSIUM SERPL-MCNC: 3.8 MMOL/L — SIGNIFICANT CHANGE UP (ref 3.5–5.3)
POTASSIUM SERPL-SCNC: 3.8 MMOL/L — SIGNIFICANT CHANGE UP (ref 3.5–5.3)
POTASSIUM SERPL-SCNC: 3.8 MMOL/L — SIGNIFICANT CHANGE UP (ref 3.5–5.3)
PROT SERPL-MCNC: 5.5 G/DL — LOW (ref 6–8.3)
PROT SERPL-MCNC: 5.5 G/DL — LOW (ref 6–8.3)
PROT UR-MCNC: 30 — SIGNIFICANT CHANGE UP
RBC # BLD: 2.36 M/UL — LOW (ref 4.2–5.8)
RBC # FLD: 15.6 % — HIGH (ref 10.3–14.5)
RBC CASTS # UR COMP ASSIST: HIGH (ref 0–?)
SAO2 % BLDA: 99.2 % — HIGH (ref 95–99)
SODIUM BLDA-SCNC: 140 MMOL/L — SIGNIFICANT CHANGE UP (ref 136–146)
SODIUM SERPL-SCNC: 145 MMOL/L — SIGNIFICANT CHANGE UP (ref 135–145)
SODIUM SERPL-SCNC: 145 MMOL/L — SIGNIFICANT CHANGE UP (ref 135–145)
SP GR SPEC: 1.01 — SIGNIFICANT CHANGE UP (ref 1–1.03)
SQUAMOUS # UR AUTO: SIGNIFICANT CHANGE UP
UROBILINOGEN FLD QL: NORMAL E.U. — SIGNIFICANT CHANGE UP (ref 0.1–0.2)
WBC # BLD: 24.05 K/UL — HIGH (ref 3.8–10.5)
WBC # FLD AUTO: 24.05 K/UL — HIGH (ref 3.8–10.5)
WBC UR QL: SIGNIFICANT CHANGE UP (ref 0–?)

## 2017-12-01 PROCEDURE — 70491 CT SOFT TISSUE NECK W/DYE: CPT | Mod: 26

## 2017-12-01 PROCEDURE — 99232 SBSQ HOSP IP/OBS MODERATE 35: CPT

## 2017-12-01 PROCEDURE — 99291 CRITICAL CARE FIRST HOUR: CPT

## 2017-12-01 PROCEDURE — 71260 CT THORAX DX C+: CPT | Mod: 26

## 2017-12-01 RX ORDER — MEROPENEM 1 G/30ML
INJECTION INTRAVENOUS
Qty: 0 | Refills: 0 | Status: DISCONTINUED | OUTPATIENT
Start: 2017-12-01 | End: 2017-12-04

## 2017-12-01 RX ORDER — POTASSIUM CHLORIDE 20 MEQ
10 PACKET (EA) ORAL
Qty: 0 | Refills: 0 | Status: COMPLETED | OUTPATIENT
Start: 2017-12-01 | End: 2017-12-01

## 2017-12-01 RX ORDER — MEROPENEM 1 G/30ML
1000 INJECTION INTRAVENOUS ONCE
Qty: 0 | Refills: 0 | Status: COMPLETED | OUTPATIENT
Start: 2017-12-01 | End: 2017-12-01

## 2017-12-01 RX ORDER — MEROPENEM 1 G/30ML
1000 INJECTION INTRAVENOUS EVERY 8 HOURS
Qty: 0 | Refills: 0 | Status: DISCONTINUED | OUTPATIENT
Start: 2017-12-01 | End: 2017-12-04

## 2017-12-01 RX ADMIN — ALBUTEROL 2 PUFF(S): 90 AEROSOL, METERED ORAL at 15:51

## 2017-12-01 RX ADMIN — Medication 250 MILLIGRAM(S): at 22:40

## 2017-12-01 RX ADMIN — Medication 100 MILLIEQUIVALENT(S): at 08:00

## 2017-12-01 RX ADMIN — Medication 250 MILLIGRAM(S): at 13:36

## 2017-12-01 RX ADMIN — Medication 650 MILLIGRAM(S): at 04:19

## 2017-12-01 RX ADMIN — Medication 500 MILLIGRAM(S): at 05:25

## 2017-12-01 RX ADMIN — Medication 1.41 MICROGRAM(S)/KG/MIN: at 08:12

## 2017-12-01 RX ADMIN — PROPOFOL 22.5 MICROGRAM(S)/KG/MIN: 10 INJECTION, EMULSION INTRAVENOUS at 08:12

## 2017-12-01 RX ADMIN — FENTANYL CITRATE 1 PATCH: 50 INJECTION INTRAVENOUS at 12:26

## 2017-12-01 RX ADMIN — MEROPENEM 100 MILLIGRAM(S): 1 INJECTION INTRAVENOUS at 08:11

## 2017-12-01 RX ADMIN — ALBUTEROL 2 PUFF(S): 90 AEROSOL, METERED ORAL at 22:08

## 2017-12-01 RX ADMIN — Medication 1 PUFF(S): at 04:42

## 2017-12-01 RX ADMIN — Medication 20 MILLIGRAM(S): at 05:25

## 2017-12-01 RX ADMIN — MEROPENEM 100 MILLIGRAM(S): 1 INJECTION INTRAVENOUS at 16:09

## 2017-12-01 RX ADMIN — Medication 300 MILLIGRAM(S): at 12:24

## 2017-12-01 RX ADMIN — Medication 500 MILLIGRAM(S): at 17:20

## 2017-12-01 RX ADMIN — Medication 1 MILLIGRAM(S): at 12:24

## 2017-12-01 RX ADMIN — VORICONAZOLE 125 MILLIGRAM(S): 10 INJECTION, POWDER, LYOPHILIZED, FOR SOLUTION INTRAVENOUS at 00:02

## 2017-12-01 RX ADMIN — PROPOFOL 22.5 MICROGRAM(S)/KG/MIN: 10 INJECTION, EMULSION INTRAVENOUS at 05:25

## 2017-12-01 RX ADMIN — VORICONAZOLE 125 MILLIGRAM(S): 10 INJECTION, POWDER, LYOPHILIZED, FOR SOLUTION INTRAVENOUS at 23:42

## 2017-12-01 RX ADMIN — Medication 1 PUFF(S): at 09:54

## 2017-12-01 RX ADMIN — Medication 250 MILLIGRAM(S): at 05:25

## 2017-12-01 RX ADMIN — Medication 1: at 17:20

## 2017-12-01 RX ADMIN — Medication 1 PUFF(S): at 22:08

## 2017-12-01 RX ADMIN — Medication 1.41 MICROGRAM(S)/KG/MIN: at 04:19

## 2017-12-01 RX ADMIN — ALBUTEROL 2 PUFF(S): 90 AEROSOL, METERED ORAL at 04:43

## 2017-12-01 RX ADMIN — BUDESONIDE AND FORMOTEROL FUMARATE DIHYDRATE 2 PUFF(S): 160; 4.5 AEROSOL RESPIRATORY (INHALATION) at 22:08

## 2017-12-01 RX ADMIN — Medication 1: at 12:24

## 2017-12-01 RX ADMIN — VORICONAZOLE 125 MILLIGRAM(S): 10 INJECTION, POWDER, LYOPHILIZED, FOR SOLUTION INTRAVENOUS at 10:32

## 2017-12-01 RX ADMIN — ENOXAPARIN SODIUM 40 MILLIGRAM(S): 100 INJECTION SUBCUTANEOUS at 12:24

## 2017-12-01 RX ADMIN — Medication 1 PUFF(S): at 15:51

## 2017-12-01 RX ADMIN — Medication 100 MILLIEQUIVALENT(S): at 10:06

## 2017-12-01 RX ADMIN — ALBUTEROL 2 PUFF(S): 90 AEROSOL, METERED ORAL at 09:54

## 2017-12-01 RX ADMIN — MEROPENEM 100 MILLIGRAM(S): 1 INJECTION INTRAVENOUS at 22:40

## 2017-12-01 RX ADMIN — BUDESONIDE AND FORMOTEROL FUMARATE DIHYDRATE 2 PUFF(S): 160; 4.5 AEROSOL RESPIRATORY (INHALATION) at 09:54

## 2017-12-01 RX ADMIN — CHLORHEXIDINE GLUCONATE 1 APPLICATION(S): 213 SOLUTION TOPICAL at 12:26

## 2017-12-01 RX ADMIN — Medication 100 MILLIEQUIVALENT(S): at 09:33

## 2017-12-01 NOTE — PROGRESS NOTE ADULT - ASSESSMENT
47 year old with sarcoidosis and COPD presents after recent andree surgery with recurrent hemoptysis.     This is liklely multifactorial- Cystic lung changes, sarcoid, ? superimposed infection many all be contributing.     Given fever, agree with broadening ot vanco/ meropenem pending culture data.  Continue voriconazole for now.

## 2017-12-01 NOTE — PROGRESS NOTE ADULT - ATTENDING COMMENTS
resp failure, as above, severe destructive sarcoid, on vent, with repeated bronchoscopy for airway clearance /bleeding/guarded

## 2017-12-01 NOTE — CHART NOTE - NSCHARTNOTEFT_GEN_A_CORE
Critically ill pt requiring PIV access for medical management and IV contrast for CT.  Under US guidance identified Lt UE vein, prox to  AC and successfully placed 20g x 1.88 inch angiocath into vessel.  Placement confirmed s/p with ultrasound and catheter determined to be in patent lumen of vein. Pt tolerated well w/o complication.

## 2017-12-01 NOTE — PROGRESS NOTE ADULT - SUBJECTIVE AND OBJECTIVE BOX
Patient is a 47y old  Male who presents with a chief complaint of hemoptysis (2017 00:32)    sedated  on full vent support  Any change in ROS:     MEDICATIONS  (STANDING):  ALBUTerol    90 MICROgram(s) HFA Inhaler 2 Puff(s) Inhalation every 6 hours  buDESOnide 160 MICROgram(s)/formoterol 4.5 MICROgram(s) Inhaler 2 Puff(s) Inhalation two times a day  chlorhexidine 4% Liquid 1 Application(s) Topical daily  enoxaparin Injectable 40 milliGRAM(s) SubCutaneous daily  fentaNYL   Patch 100 MICROgram(s)/Hr. 1 Patch Transdermal every 48 hours  ferrous    sulfate Liquid 300 milliGRAM(s) Enteral Tube daily  folic acid 1 milliGRAM(s) Oral daily  insulin lispro (HumaLOG) corrective regimen sliding scale   SubCutaneous every 6 hours  ipratropium 17 MICROgram(s) HFA Inhaler 1 Puff(s) Inhalation every 6 hours  meropenem IVPB      meropenem IVPB 1000 milliGRAM(s) IV Intermittent every 8 hours  norepinephrine Infusion 0.01 MICROgram(s)/kG/Min (1.406 mL/Hr) IV Continuous <Continuous>  polyethylene glycol 3350 17 Gram(s) Oral at bedtime  predniSONE   Tablet 20 milliGRAM(s) Oral daily  propofol Infusion 50 MICROgram(s)/kG/Min (22.5 mL/Hr) IV Continuous <Continuous>  senna Syrup 10 milliLiter(s) Oral at bedtime  valproic  acid Syrup 500 milliGRAM(s) Oral two times a day  vancomycin  IVPB 1000 milliGRAM(s) IV Intermittent every 8 hours  voriconazole IVPB 300 milliGRAM(s) IV Intermittent every 12 hours    MEDICATIONS  (PRN):  acetaminophen   Tablet 650 milliGRAM(s) Oral every 6 hours PRN For Temp greater than 38 C (100.4 F)    Vital Signs Last 24 Hrs  T(C): 37.3 (01 Dec 2017 20:00), Max: 39.1 (01 Dec 2017 04:00)  T(F): 99.1 (01 Dec 2017 20:00), Max: 102.3 (01 Dec 2017 04:00)  HR: 105 (01 Dec 2017 20:00) (53 - 125)  BP: 110/69 (01 Dec 2017 20:00) (92/61 - 151/82)  BP(mean): 78 (01 Dec 2017 20:00) (68 - 107)  RR: 13 (01 Dec 2017 20:00) (11 - 25)  SpO2: 100% (01 Dec 2017 20:00) (96% - 100%)  Mode: AC/ CMV (Assist Control/ Continuous Mandatory Ventilation)  RR (machine): 12  TV (machine): 500  FiO2: 40  PEEP: 8  MAP: 16  PIP: 36    I&O's Summary    2017 07:  -  01 Dec 2017 07:00  --------------------------------------------------------  IN: 1577.1 mL / OUT: 2115 mL / NET: -537.9 mL    01 Dec 2017 07:01  -  01 Dec 2017 22:02  --------------------------------------------------------  IN: 1554.5 mL / OUT: 950 mL / NET: 604.5 mL          Physical Exam:   GENERAL: NAD, well-groomed, well-developed  HEENT: GIOVANI/   Atraumatic, Normocephalic  ENMT: No tonsillar erythema, exudates, or enlargement; Moist mucous membranes, Good dentition, No lesions  NECK: Supple, No JVD, Normal thyroid  CHEST/LUNG: Coarse rhonchi  CVS: Regular rate and rhythm; No murmurs, rubs, or gallops  GI: : Soft, Nontender, Nondistended; Bowel sounds present  NERVOUS SYSTEM:  sedated  EXTREMITIES:  2+ Peripheral Pulses, No clubbing, cyanosis, or edema  LYMPH: No lymphadenopathy noted  SKIN: No rashes or lesions  ENDOCRINOLOGY: No Thyromegaly  PSYCH: Calm    Labs:  ABG - ( 01 Dec 2017 04:00 )  pH: 7.45  /  pCO2: 62    /  pO2: 129   / HCO3: 40    / Base Excess: 16.7  /  SaO2: 99.2                                        7.4    24.05 )-----------( 185      ( 01 Dec 2017 04:00 )             23.3                         8.3    19.72 )-----------( 197      ( 2017 20:45 )             26.0                         7.8    12.87 )-----------( 138      ( 2017 03:10 )             25.2                         7.2    9.43  )-----------( 137      ( 2017 03:30 )             22.3                         7.2    10.80 )-----------( 111      ( 2017 02:50 )             22.8         145  |  97<L>  |  7   ----------------------------<  96  3.8   |  40<H>  |  0.74      145  |  98  |  9   ----------------------------<  126<H>  4.0   |  41<H>  |  0.70      139  |  94<L>  |  12  ----------------------------<  138<H>  4.1   |  38<H>  |  0.57      142  |  95<L>  |  12  ----------------------------<  144<H>  4.3   |  40<H>  |  0.73    Ca    8.6      01 Dec 2017 04:00  Ca    8.2<L>      2017 03:10  Phos  3.5       Phos  2.2       Mg     2.1       Mg     2.1         TPro  5.5<L>  /  Alb  2.3<L>  /  TBili  0.2  /  DBili  x   /  AST  18  /  ALT  12  /  AlkPhos  66    TPro  5.3<L>  /  Alb  2.2<L>  /  TBili  0.2  /  DBili  x   /  AST  18  /  ALT  12  /  AlkPhos  65    TPro  5.4<L>  /  Alb  2.4<L>  /  TBili  0.2  /  DBili  x   /  AST  12  /  ALT  8   /  AlkPhos  61      CAPILLARY BLOOD GLUCOSE      POCT Blood Glucose.: 155 mg/dL (01 Dec 2017 17:16)  POCT Blood Glucose.: 163 mg/dL (01 Dec 2017 12:22)  POCT Blood Glucose.: 86 mg/dL (01 Dec 2017 05:37)  POCT Blood Glucose.: 130 mg/dL (2017 23:35)      LIVER FUNCTIONS - ( 01 Dec 2017 04:00 )  Alb: 2.3 g/dL / Pro: 5.5 g/dL / ALK PHOS: 66 u/L / ALT: 12 u/L / AST: 18 u/L / GGT: x             Urinalysis Basic - ( 01 Dec 2017 05:10 )    Color: YELLOW / Appearance: HAZY / S.010 / pH: 7.0  Gluc: NEGATIVE / Ketone: NEGATIVE  / Bili: NEGATIVE / Urobili: NORMAL E.U.   Blood: NEGATIVE / Protein: 30 / Nitrite: NEGATIVE   Leuk Esterase: SMALL / RBC: 5-10 / WBC 10-25   Sq Epi: OCC / Non Sq Epi: x / Bacteria: x      Cultures:     Vancomycin Trough:      @ 13:20  16.3    Gentamicin:           @ 13:20  --      Blood Culture:            @ 15:47  Organism Klebsiella pneumoniae  Gram stain --    Blood Culture:            @ 12:46  Organism Staphylococcus epidermidis  Gram stain --      Urine Culture:           @ 15:47  Organism Klebsiella pneumoniae    Urine Culture:           @ 12:46  Organism Staphylococcus epidermidis      Wound culture:                 @ 15:21  Organism --  Culture w/ gram stain --  Specimen Source BLOOD VENOUS    Wound culture:                 @ 04:37  Organism --  Culture w/ gram stain --  Specimen Source BLOOD    Wound culture:                 @ 22:40  Organism --  Culture w/ gram stain --  Specimen Source BLOOD    Wound culture:                 @ 15:47  Organism Klebsiella pneumoniae  Culture w/ gram stain --  Specimen Source URINE CATHETER    Wound culture:                 @ 12:46  Organism Staphylococcus epidermidis  Culture w/ gram stain --  Specimen Source BLOOD      Abscess culture:              @ 15:21  Organism --  Gram Stain --  Specimen Source BLOOD VENOUS    Abscess culture:              @ 04:37  Organism --  Gram Stain --  Specimen Source BLOOD    Abscess culture:              @ 22:40  Organism --  Gram Stain --  Specimen Source BLOOD    Abscess culture:              @ 15:47  Organism Klebsiella pneumoniae  Gram Stain --  Specimen Source URINE CATHETER    Abscess culture:              @ 12:46  Organism Staphylococcus epidermidis  Gram Stain --  Specimen Source BLOOD      CSF:               @ 15:47  Organism Klebsiella pneumoniae  Gram Stain --    CSF:               @ 12:46  Organism Staphylococcus epidermidis  Gram Stain --      Tissue culture:            @ 15:21  Organism --  Gram Stain --  Specimen Source BLOOD VENOUS    Tissue culture:            @ 04:37  Organism --  Gram Stain --  Specimen Source BLOOD    Tissue culture:            @ 22:40  Organism --  Gram Stain --  Specimen Source BLOOD    Tissue culture:            @ 15:47  Organism Klebsiella pneumoniae  Gram Stain --  Specimen Source URINE CATHETER    Tissue culture:            @ 12:46  Organism Staphylococcus epidermidis  Gram Stain --  Specimen Source BLOOD      Body Fluid Smear & Culture:                         @ 15:21  AFB Smear  --  Culture Acid Fast Body Fluid w/ Smear  --                Studies  Chest X-RAY  CT SCAN Chest   Venous Dopplers: LE:   CT Abdomen  Others      < from: CT Chest w/ IV Cont (17 @ 08:08) >    Upper Abdomen: Punctate nonobstructing bilateral renal calculi.    Bones And Soft Tissues: Left seventh rib thoracotomy rib defect.  The   soft tissues are unremarkable.    IMPRESSION:    1.  Bilateral lobe tree-in-bud opacities with new patchy bilateral lobe   consolidations likely represent pneumonia.  2.  Small right and trace left pleural effusions.  3.  Interval placement of a left anterior approach pigtail catheter with   interval slight decrease in size of air within the left upper lobe   lobectomy bed.    < end of copied text >

## 2017-12-01 NOTE — PROGRESS NOTE ADULT - PROBLEM SELECTOR PLAN 4
Cont BD with inhaled steroids and oral steroids  11/114: Pts wheezing has significantly improved: he will need long term steroids as he starts wheezing once his steroids are dced!  11/15: Wheezing has increased: started on IV steroids high dose by thoracic team: Would suggest to decrease to 20 mg three times a day !  11/16: decrease steroids today to 20 mg three times day  11/17: try to decrease steroids in AM  11/18 Duoneb. intubated for acute respiratory distress with hypoxia  11/19 KIARA. intubated and on ventilator support  11/20: cont vent support: start weaning as tolerated!  11/21: would decrease  his steroids to 30 mg a day from tomorrow  11/22: DECREASE STEROIDS: HE WHEEZES WHEN THE STEROIDS ARE TAPERED: BUT NEED TO BE LOWERED NOW AND WATCH!!  11/23: on 40 mg of prednisone: try to decrease and taper  11/24: prednisone decreased to 30 ,g a day !!  11/25: on 30 mg a day now:  11/27: on steroids: for repeat ct scan chest today  11/28: ct reviewed with MICU attending  11/30: on bronchodilators and steroids: weaning trials  12/01:cont weaning trials as tolerated : new ct noted:not much reexpansion of ptx!!

## 2017-12-01 NOTE — PROGRESS NOTE ADULT - PROBLEM SELECTOR PLAN 1
now spiked to fever:  on zosyn, vancomycin, as well as voriconazole~  id FOLLOW UP  11/27: Cont antibiotics::  11/28: cont current treatment  11/30: continues to be on broad spectrum antibiotics:  12/01: antibiotics upgraded to meropenem :still febrile:

## 2017-12-01 NOTE — PROGRESS NOTE ADULT - SUBJECTIVE AND OBJECTIVE BOX
CHIEF COMPLAINT: Hemoptysis, inability to wean vent    Interval Events: Bronchoscopy done yesterday to evaluate recurrent bloody secretions; showed old blood and swelling of the epiglottis. Spiked another fever (102.7) overnight, repeat cultures drawn. C. diff sent as having loose stools but was negative.     REVIEW OF SYSTEMS:  Constitutional: [ ] negative [x ] fevers [ ] chills [ ] weight loss [ ] weight gain  HEENT: [x ] negative [ ] dry eyes [ ] eye irritation [ ] postnasal drip [ ] nasal congestion  CV: [x ] negative  [ ] chest pain [ ] orthopnea [ ] palpitations [ ] murmur  Resp: [ ] negative [ ] cough [x ] shortness of breath [ ] dyspnea [ ] wheezing [ ] sputum [ ] hemoptysis  GI: [x ] negative [ ] nausea [ ] vomiting [ ] diarrhea [ ] constipation [ ] abd pain [ ] dysphagia   : [ x] negative [ ] dysuria [ ] nocturia [ ] hematuria [ ] increased urinary frequency  Musculoskeletal: [x ] negative [ ] back pain [ ] myalgias [ ] arthralgias [ ] fracture  Skin: [x ] negative [ ] rash [ ] itch  Neurological: [x ] negative [ ] headache [ ] dizziness [ ] syncope [ ] weakness [ ] numbness  Psychiatric: [ ] negative [ ] anxiety [ ] depression  Endocrine: [ ] negative [ ] diabetes [ ] thyroid problem  Hematologic/Lymphatic: [ ] negative [ ] anemia [ ] bleeding problem  Allergic/Immunologic: [ ] negative [ ] itchy eyes [ ] nasal discharge [ ] hives [ ] angioedema  [ ] All other systems negative  [ ] Unable to assess ROS because ________    OBJECTIVE:  ICU Vital Signs Last 24 Hrs  T(C): 37.9 (01 Dec 2017 06:00), Max: 39.1 (01 Dec 2017 04:00)  T(F): 100.3 (01 Dec 2017 06:00), Max: 102.3 (01 Dec 2017 04:00)  HR: 108 (01 Dec 2017 06:00) (96 - 130)  BP: 102/69 (01 Dec 2017 06:00) (69/46 - 169/93)  BP(mean): 76 (01 Dec 2017 06:00) (50 - 109)  ABP: --  ABP(mean): --  RR: 17 (01 Dec 2017 06:00) (12 - 34)  SpO2: 99% (01 Dec 2017 06:00) (91% - 100%)    Mode: AC/ CMV (Assist Control/ Continuous Mandatory Ventilation), RR (machine): 12, TV (machine): 500, FiO2: 40, PEEP: 8, MAP: 17, PIP: 45     @ 07:01  -   @ 07:00  --------------------------------------------------------  IN: 2633.6 mL / OUT: 2825 mL / NET: -191.4 mL     @ 07:  -   @ 06:59  --------------------------------------------------------  IN: 1577.1 mL / OUT: 2115 mL / NET: -537.9 mL      CAPILLARY BLOOD GLUCOSE      POCT Blood Glucose.: 86 mg/dL (01 Dec 2017 05:37)      PHYSICAL EXAM:  General:   HEENT:   Lymph Nodes:  Neck:   Respiratory:   Cardiovascular:   Abdomen:   Extremities:   Skin:   Neurological:  Psychiatry:    LINES:    HOSPITAL MEDICATIONS:  Standing Meds:  ALBUTerol    90 MICROgram(s) HFA Inhaler 2 Puff(s) Inhalation every 6 hours  buDESOnide 160 MICROgram(s)/formoterol 4.5 MICROgram(s) Inhaler 2 Puff(s) Inhalation two times a day  chlorhexidine 4% Liquid 1 Application(s) Topical daily  enoxaparin Injectable 40 milliGRAM(s) SubCutaneous daily  fentaNYL   Patch 100 MICROgram(s)/Hr. 1 Patch Transdermal every 48 hours  ferrous    sulfate Liquid 300 milliGRAM(s) Enteral Tube daily  folic acid 1 milliGRAM(s) Oral daily  insulin lispro (HumaLOG) corrective regimen sliding scale   SubCutaneous every 6 hours  ipratropium 17 MICROgram(s) HFA Inhaler 1 Puff(s) Inhalation every 6 hours  meropenem IVPB      meropenem IVPB 1000 milliGRAM(s) IV Intermittent once  meropenem IVPB 1000 milliGRAM(s) IV Intermittent every 8 hours  norepinephrine Infusion 0.01 MICROgram(s)/kG/Min IV Continuous <Continuous>  polyethylene glycol 3350 17 Gram(s) Oral at bedtime  predniSONE   Tablet 20 milliGRAM(s) Oral daily  propofol Infusion 50 MICROgram(s)/kG/Min IV Continuous <Continuous>  senna Syrup 10 milliLiter(s) Oral at bedtime  valproic  acid Syrup 500 milliGRAM(s) Oral two times a day  vancomycin  IVPB 1000 milliGRAM(s) IV Intermittent every 8 hours  voriconazole IVPB 300 milliGRAM(s) IV Intermittent every 12 hours      PRN Meds:  acetaminophen   Tablet 650 milliGRAM(s) Oral every 6 hours PRN      LABS:                        7.4    24.05 )-----------( 185      ( 01 Dec 2017 04:00 )             23.3     Hgb Trend: 7.4<--, 8.3<--, 7.8<--, 7.2<--, 7.2<--  12-    145  |  97<L>  |  7   ----------------------------<  96  3.8   |  40<H>  |  0.74    Ca    8.6      01 Dec 2017 04:00  Phos  3.5     12  Mg     2.1         TPro  5.5<L>  /  Alb  2.3<L>  /  TBili  0.2  /  DBili  x   /  AST  18  /  ALT  12  /  AlkPhos  66  12    Creatinine Trend: 0.74<--, 0.70<--, 0.57<--, 0.73<--, 0.66<--, 0.72<--    Urinalysis Basic - ( 01 Dec 2017 05:10 )    Color: YELLOW / Appearance: HAZY / S.010 / pH: 7.0  Gluc: NEGATIVE / Ketone: NEGATIVE  / Bili: NEGATIVE / Urobili: NORMAL E.U.   Blood: NEGATIVE / Protein: 30 / Nitrite: NEGATIVE   Leuk Esterase: SMALL / RBC: 5-10 / WBC 10-25   Sq Epi: OCC / Non Sq Epi: x / Bacteria: x      Arterial Blood Gas:   @ 04:00  7.45/62/129/40/99.2/16.7  ABG lactate: 0.8        MICROBIOLOGY:     Culture - Blood (collected 2017 15:21)  Source: BLOOD VENOUS  Preliminary Report (2017 15:21):    NO ORGANISMS ISOLATED    NO ORGANISMS ISOLATED AT 24 HOURS    Culture - Blood (collected 2017 04:37)  Source: BLOOD  Preliminary Report (01 Dec 2017 04:37):    NO ORGANISMS ISOLATED    NO ORGANISMS ISOLATED AT 48 HRS.      RADIOLOGY:  [ ] Reviewed and interpreted by me    EKG: CHIEF COMPLAINT: Hemoptysis, inability to wean vent    Interval Events: Bronchoscopy done yesterday to evaluate recurrent bloody secretions; showed old blood and swelling of the epiglottis. Spiked another fever (102.7) overnight, repeat cultures drawn. C. diff sent as having loose stools but was negative.     REVIEW OF SYSTEMS:  Constitutional: [ ] negative [x ] fevers [ ] chills [ ] weight loss [ ] weight gain  HEENT: [x ] negative [ ] dry eyes [ ] eye irritation [ ] postnasal drip [ ] nasal congestion  CV: [x ] negative  [ ] chest pain [ ] orthopnea [ ] palpitations [ ] murmur  Resp: [ ] negative [ ] cough [x ] shortness of breath [ ] dyspnea [ ] wheezing [ ] sputum [ ] hemoptysis  GI: [x ] negative [ ] nausea [ ] vomiting [ ] diarrhea [ ] constipation [ ] abd pain [ ] dysphagia   : [ x] negative [ ] dysuria [ ] nocturia [ ] hematuria [ ] increased urinary frequency  Musculoskeletal: [x ] negative [ ] back pain [ ] myalgias [ ] arthralgias [ ] fracture  Skin: [x ] negative [ ] rash [ ] itch  Neurological: [x ] negative [ ] headache [ ] dizziness [ ] syncope [ ] weakness [ ] numbness  Psychiatric: [ ] negative [ ] anxiety [ ] depression  Endocrine: [ ] negative [ ] diabetes [ ] thyroid problem  Hematologic/Lymphatic: [ ] negative [ ] anemia [ ] bleeding problem  Allergic/Immunologic: [ ] negative [ ] itchy eyes [ ] nasal discharge [ ] hives [ ] angioedema  [ ] All other systems negative  [ ] Unable to assess ROS because ________    OBJECTIVE:  ICU Vital Signs Last 24 Hrs  T(C): 37.9 (01 Dec 2017 06:00), Max: 39.1 (01 Dec 2017 04:00)  T(F): 100.3 (01 Dec 2017 06:00), Max: 102.3 (01 Dec 2017 04:00)  HR: 108 (01 Dec 2017 06:00) (96 - 130)  BP: 102/69 (01 Dec 2017 06:00) (69/46 - 169/93)  BP(mean): 76 (01 Dec 2017 06:00) (50 - 109)  ABP: --  ABP(mean): --  RR: 17 (01 Dec 2017 06:00) (12 - 34)  SpO2: 99% (01 Dec 2017 06:00) (91% - 100%)    Mode: AC/ CMV (Assist Control/ Continuous Mandatory Ventilation), RR (machine): 12, TV (machine): 500, FiO2: 40, PEEP: 8, MAP: 17, PIP: 45     @ 07:01  -   @ 07:00  --------------------------------------------------------  IN: 2633.6 mL / OUT: 2825 mL / NET: -191.4 mL     @ 07:01  -   @ 06:59  --------------------------------------------------------  IN: 1577.1 mL / OUT: 2115 mL / NET: -537.9 mL      CAPILLARY BLOOD GLUCOSE      POCT Blood Glucose.: 86 mg/dL (01 Dec 2017 05:37)      PHYSICAL EXAM:  General: on sedation, less responsive than usual  HEENT: trach in place, no active bleeding around trach  Lymph Nodes: no adenopathy  Neck: supple, trach in place without active bleeding  Respiratory: b/l rhonchi, at baseline, L upper pigtail in place  Cardiovascular: tachycardic with frequent PVCs  Abdomen: soft, NTND  Extremities: warm, well perfused  Skin: intact  Neurological: able to move all extremities  Psychiatry: normal    LINES:    HOSPITAL MEDICATIONS:  Standing Meds:  ALBUTerol    90 MICROgram(s) HFA Inhaler 2 Puff(s) Inhalation every 6 hours  buDESOnide 160 MICROgram(s)/formoterol 4.5 MICROgram(s) Inhaler 2 Puff(s) Inhalation two times a day  chlorhexidine 4% Liquid 1 Application(s) Topical daily  enoxaparin Injectable 40 milliGRAM(s) SubCutaneous daily  fentaNYL   Patch 100 MICROgram(s)/Hr. 1 Patch Transdermal every 48 hours  ferrous    sulfate Liquid 300 milliGRAM(s) Enteral Tube daily  folic acid 1 milliGRAM(s) Oral daily  insulin lispro (HumaLOG) corrective regimen sliding scale   SubCutaneous every 6 hours  ipratropium 17 MICROgram(s) HFA Inhaler 1 Puff(s) Inhalation every 6 hours  meropenem IVPB      meropenem IVPB 1000 milliGRAM(s) IV Intermittent once  meropenem IVPB 1000 milliGRAM(s) IV Intermittent every 8 hours  norepinephrine Infusion 0.01 MICROgram(s)/kG/Min IV Continuous <Continuous>  polyethylene glycol 3350 17 Gram(s) Oral at bedtime  predniSONE   Tablet 20 milliGRAM(s) Oral daily  propofol Infusion 50 MICROgram(s)/kG/Min IV Continuous <Continuous>  senna Syrup 10 milliLiter(s) Oral at bedtime  valproic  acid Syrup 500 milliGRAM(s) Oral two times a day  vancomycin  IVPB 1000 milliGRAM(s) IV Intermittent every 8 hours  voriconazole IVPB 300 milliGRAM(s) IV Intermittent every 12 hours      PRN Meds:  acetaminophen   Tablet 650 milliGRAM(s) Oral every 6 hours PRN      LABS:                        7.4    24.05 )-----------( 185      ( 01 Dec 2017 04:00 )             23.3     Hgb Trend: 7.4<--, 8.3<--, 7.8<--, 7.2<--, 7.2<--  12-01    145  |  97<L>  |  7   ----------------------------<  96  3.8   |  40<H>  |  0.74    Ca    8.6      01 Dec 2017 04:00  Phos  3.5     12-  Mg     2.1     12    TPro  5.5<L>  /  Alb  2.3<L>  /  TBili  0.2  /  DBili  x   /  AST  18  /  ALT  12  /  AlkPhos  66  12-    Creatinine Trend: 0.74<--, 0.70<--, 0.57<--, 0.73<--, 0.66<--, 0.72<--    Urinalysis Basic - ( 01 Dec 2017 05:10 )    Color: YELLOW / Appearance: HAZY / S.010 / pH: 7.0  Gluc: NEGATIVE / Ketone: NEGATIVE  / Bili: NEGATIVE / Urobili: NORMAL E.U.   Blood: NEGATIVE / Protein: 30 / Nitrite: NEGATIVE   Leuk Esterase: SMALL / RBC: 5-10 / WBC 10-25   Sq Epi: OCC / Non Sq Epi: x / Bacteria: x      Arterial Blood Gas:   @ 04:00  7.45/62/129/40/99.2/16.7  ABG lactate: 0.8        MICROBIOLOGY:     Culture - Blood (collected 2017 15:21)  Source: BLOOD VENOUS  Preliminary Report (2017 15:21):    NO ORGANISMS ISOLATED    NO ORGANISMS ISOLATED AT 24 HOURS    Culture - Blood (collected 2017 04:37)  Source: BLOOD  Preliminary Report (01 Dec 2017 04:37):    NO ORGANISMS ISOLATED    NO ORGANISMS ISOLATED AT 48 HRS.      RADIOLOGY:  [ ] Reviewed and interpreted by me    EKG:

## 2017-12-01 NOTE — PROGRESS NOTE ADULT - PROBLEM SELECTOR PLAN 5
on prednisone.  11/10 continue steroid  11/11 on steroid  11/12 on oral steroid  11/13: cont oral steroids!!  11/14: Has pretty poor lungs secondary to steroids: Cont current therapy!!  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  11/27: cont on steroids  11/30: om 30 mg of prednisone  12/01:on 20 mg of prednisone now !!

## 2017-12-01 NOTE — PROGRESS NOTE ADULT - PROBLEM SELECTOR PLAN 7
s/p pleurodesis.  11/10 s/p pleurodesis. Management defer to CTS  11/11 no intervention for now. per CTS.  11/13: has mod pneumothorax stable: defer to thoracic for any intervention!  11/12 no intervention planned. bronchoscopy in future per CTS  11/14: Pt still has left sided pneumothorax: s/p blood patch as well as the talc pleurodesis on the left side: Hard to do any other intervention : would defer to thoracic surgical team!!  11/18 management per CTS. not plan for Chest tube at this moment.  11/19 stable radiographically and clinically. Management defer to CTS  11/22: Has this air space in the left lower lobe following DAVID lobectomy: ? PTX, empty space ? any intervention would be difficult: would defer to ct surgery!!  11/27/2017: for rpt ct scan chest now  1130: s/p chest tube placement on left side: yesterdays x-ray no full lung reexpansion:  12/01/2017: with only slight reexpansion of the ptx on left side after a small chest tube!!

## 2017-12-01 NOTE — PROGRESS NOTE ADULT - SUBJECTIVE AND OBJECTIVE BOX
Follow Up:      Inverval History/ROS:Patient is a 47y old  Male who presents with a chief complaint of hemoptysis (2017 00:32)    Had hemoptysis and repeat bronch yesterday. febrile overnight  Allergies    No Known Allergies    Intolerances        ANTIMICROBIALS:  meropenem IVPB    meropenem IVPB 1000 every 8 hours  vancomycin  IVPB 1000 every 8 hours  voriconazole IVPB 300 every 12 hours      OTHER MEDS:  acetaminophen   Tablet 650 milliGRAM(s) Oral every 6 hours PRN  ALBUTerol    90 MICROgram(s) HFA Inhaler 2 Puff(s) Inhalation every 6 hours  buDESOnide 160 MICROgram(s)/formoterol 4.5 MICROgram(s) Inhaler 2 Puff(s) Inhalation two times a day  chlorhexidine 4% Liquid 1 Application(s) Topical daily  enoxaparin Injectable 40 milliGRAM(s) SubCutaneous daily  fentaNYL   Patch 100 MICROgram(s)/Hr. 1 Patch Transdermal every 48 hours  ferrous    sulfate Liquid 300 milliGRAM(s) Enteral Tube daily  folic acid 1 milliGRAM(s) Oral daily  insulin lispro (HumaLOG) corrective regimen sliding scale   SubCutaneous every 6 hours  ipratropium 17 MICROgram(s) HFA Inhaler 1 Puff(s) Inhalation every 6 hours  norepinephrine Infusion 0.01 MICROgram(s)/kG/Min IV Continuous <Continuous>  polyethylene glycol 3350 17 Gram(s) Oral at bedtime  predniSONE   Tablet 20 milliGRAM(s) Oral daily  propofol Infusion 50 MICROgram(s)/kG/Min IV Continuous <Continuous>  senna Syrup 10 milliLiter(s) Oral at bedtime  valproic  acid Syrup 500 milliGRAM(s) Oral two times a day      Vital Signs Last 24 Hrs  T(C): 37.6 (01 Dec 2017 12:00), Max: 39.1 (01 Dec 2017 04:00)  T(F): 99.6 (01 Dec 2017 12:00), Max: 102.3 (01 Dec 2017 04:00)  HR: 104 (01 Dec 2017 15:51) (53 - 130)  BP: 120/84 (01 Dec 2017 15:45) (69/46 - 169/93)  BP(mean): 92 (01 Dec 2017 15:45) (50 - 109)  RR: 19 (01 Dec 2017 15:45) (11 - 34)  SpO2: 100% (01 Dec 2017 15:51) (91% - 100%)    PHYSICAL EXAM:  General: [x ] trach  HEAD/EYES: [ ] PERRL [ x] white sclera [ ] icterus  ENT:  [x ] normal [ ] supple [ ] thrush [ ] pharyngeal exudate  Cardiovascular:   [ ] murmur [ x] normal [ ] PPM/AICD  Respiratory:  [ x] clear to ausculation bilaterally  GI:  [ x] soft, non-tender, normal bowel sounds  :  [ ] mock [x ] no CVA tenderness   Musculoskeletal:  [x ] no synovitis  Neurologic:  [x ] non-focal exam   Skin:  [ x] no rash  Lymph: [ ] no lymphadenopathy  Psychiatric:  [ ] appropriate affect [x ] alert & oriented  Lines:  [x ] no phlebitis [ ] central line                                7.4    24.05 )-----------( 185      ( 01 Dec 2017 04:00 )             23.3       12-    145  |  97<L>  |  7   ----------------------------<  96  3.8   |  40<H>  |  0.74    Ca    8.6      01 Dec 2017 04:00  Phos  3.5     12-  Mg     2.1         TPro  5.5<L>  /  Alb  2.3<L>  /  TBili  0.2  /  DBili  x   /  AST  18  /  ALT  12  /  AlkPhos  66  12-      Urinalysis Basic - ( 01 Dec 2017 05:10 )    Color: YELLOW / Appearance: HAZY / S.010 / pH: 7.0  Gluc: NEGATIVE / Ketone: NEGATIVE  / Bili: NEGATIVE / Urobili: NORMAL E.U.   Blood: NEGATIVE / Protein: 30 / Nitrite: NEGATIVE   Leuk Esterase: SMALL / RBC: 5-10 / WBC 10-25   Sq Epi: OCC / Non Sq Epi: x / Bacteria: x        MICROBIOLOGY:    RADIOLOGY:

## 2017-12-01 NOTE — PROGRESS NOTE ADULT - ATTENDING COMMENTS
pt doing poorly  paralyzed, sedated but not requiring high FIO2 :   no bleeding noted:  try to wean sedation as well as paralyzing agent   Steroids have been decreased: goal would be rapidly tapered down steroids to minimum possible given h e is on sedation and paralyzing agent: But per reports pt gets very agitated when taken of paralyzing agent!    11/20: off paralyzing agent; cont current care: may decrease steroids to 30 mg in a day or two    11/21: off sedation : alert and awake and responds to simple commands; weaning trials!!  11/28: ct reviewed: lung herniation noted: for possible chest tube today:   11/23: continues to be on ventilator: failed weaning attempts multiple times: for trach now: prognosis guarded!!  11/24: stable" but critical!!  11/25: NEW FEVER: PANCULTURE AS WELL AS EMPIRIC ANTIBIOTICS:  11/30: stable: cont current care:  12/01/2017: really sick: pretty bad Sarcoid lungs!!: Cont current supportive care !!!

## 2017-12-01 NOTE — PROGRESS NOTE ADULT - PROBLEM SELECTOR PLAN 3
? 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 ot from bronchiectasis? eliquis have been dced: needs card to se: In addition, he may need IR help with embolization, it his hemoptysis increases: He has had pleurodesis on the left side.  11/10 quantify hemoptysis. no AC. stable  11/11 no more hemoptysis. off AC  11/12 resolved. off AC  11/13: today had hemoptysis again----->localize site---> IR embolization: left a message for thoracic team  11/14: DW Dr Caldera: for repeat bronchoscopy to localize the site of bleeding and need IR help to embolize the culprit vessel!  11/15: started on broad spectrum antibiotics and for bronchoscopy today  11/16: s/p IR emobolization done: monitor for more hemoptysis  11/17: s/p bronchsocopy: reportedly old blood seen in airways!  11/18 s/p bronchoscopy in ICU via ETT. old clot. no active bleeding  11/19 stable. no active bleeding per recent bronchoscopy  11/20; off AC at this time  11/22: resolved: remained off ac  11/23: remained off AC: has hx of PAF  11/24: remains off AC  11/27: no bleeding noted  11/30: no recent  hemoptysis  12/01:so far no more hemoptysis

## 2017-12-01 NOTE — PROGRESS NOTE ADULT - ASSESSMENT
47M with hx of sarcoidosis, started on Eqliuis for cerebrovascular infarct with PFO on echo and paroxysmal afib in September c/b recurrent hemoptysis (Eliquis d/c'd on 9/20, then restarted between 10/23-27 after hemoptysis improved), d/c'd to rehab then returned 11/9 for recurrent hemoptysis requiring intubation and CTICU admission for management of hemoptysis including IR embolization (11/16) and serial bronch (11/17 and 11/20) in CTICU with no active bleeding visualized, now remains intubated with difficulty weaning vent as during CPAP trials pt fails to initiate breaths, unclear etiology. Transferred to MICU from CTICU for continued vent management and attempted weaning. Bedside trach placed 11/24 c/b episode of hypoxia and bleeding around trach site after suctioning, resolved after surgicel and pressure. Has been febrile intermittently, though improving, grew Klebsiella on urine cx, blood cx with staph epi. Now with recurrent bloody secretions from trach s/p rpt bronch 11/30, spiked another temp overnight (102.7) and repeat cultures drawn.     # Neuro:  -off sedation, doing well, moving all extremities  -PT for increase mobilization    #Cardiovascular:  -Off pressors, transiently on pressors yesterday kacy-bronch    #Respiratory:  -Trach placed 8 days ago at bedside  -L upper anterior pigtail placed with improvement in pulmonary function and moderate re-expansion of the lung.  -Continue bronchodilators and pulmonary toilet  -prednisone decreased yesterday to 20mg  -Recurrent bloody secretions from     #GI:  -on tube feeds, may need evaluation for PEG    #Renal:  -Monitor I's and O's    #Heme:  -H/H stable; baseline Hb 9-10;   -thrombocytopenia improving vs acute phase reactant to infection    #ID:  -Aspergilloma dx 9/24/17, on voriconazole x 6 weeks per ID recommendations. Recommend continuing voriconazole given hemoptysis recurred after was stopped, possible related to inflammation 2/2 aspergilloma and possible continued infection.  -Recultured and started on antibiotics 11/26 for fevers up to 103F. Afebrile x 24 hours now.  -UA grossly positive, pt grew Klebsiella 100K colonies. Pansensitive. Deescalated zosyn to ceftriaxone, now re-escalated to meropenem in setting of recurrent fever with antibiotics  -Given pt consistently grows staph epi, will c/w vanco, may need JONATAN for evaluation  -Repeat blood cx drawn 11/30    #Endocrine:   -On tube feeds, increased yesterday per dietary's recommendations FS q6h, SS,  --172    #DVT PPX: octavio Cantu, PGY3  Emergency Medicine  [address] 47M with hx of sarcoidosis, started on Eqliuis for cerebrovascular infarct with PFO on echo and paroxysmal afib in September c/b recurrent hemoptysis (Eliquis d/c'd on 9/20, then restarted between 10/23-27 after hemoptysis improved), d/c'd to rehab then returned 11/9 for recurrent hemoptysis requiring intubation and CTICU admission for management of hemoptysis including IR embolization (11/16) and serial bronch (11/17 and 11/20) in CTICU with no active bleeding visualized, now remains intubated with difficulty weaning vent as during CPAP trials pt fails to initiate breaths, unclear etiology. Transferred to MICU from CTICU for continued vent management and attempted weaning. Bedside trach placed 11/24 c/b episode of hypoxia and bleeding around trach site after suctioning, resolved after surgicel and pressure. Has been febrile intermittently, though improving, grew Klebsiella on urine cx, blood cx with staph epi. Now with recurrent bloody secretions from trach s/p rpt bronch 11/30, spiked another temp overnight (102.7) and repeat cultures drawn.     # Neuro:  -off sedation, doing well, moving all extremities  -PT for increase mobilization    #Cardiovascular:  -Off pressors, transiently on pressors yesterday kacy-bronch    #Respiratory:  -Trach placed 8 days ago at bedside  -L upper anterior pigtail placed with improvement in pulmonary function and moderate re-expansion of the lung.  -Continue bronchodilators and pulmonary toilet  -prednisone decreased yesterday to 20mg  -Recurrent bloody secretions from trach  -Pending CT neck/chest today    #GI:  -on tube feeds, may need evaluation for PEG    #Renal:  -Monitor I's and O's    #Heme:  -H/H stable; baseline Hb 9-10;   -thrombocytopenia improving vs acute phase reactant to infection    #ID:  -Aspergilloma dx 9/24/17, on voriconazole x 6 weeks per ID recommendations. Recommend continuing voriconazole given hemoptysis recurred after was stopped, possible related to inflammation 2/2 aspergilloma and possible continued infection.  -Recultured and started on antibiotics 11/26 for fevers up to 103F. Afebrile x 24 hours now.  -UA grossly positive, pt grew Klebsiella 100K colonies. Pansensitive. Deescalated zosyn to ceftriaxone, now re-escalated to meropenem in setting of recurrent fever with antibiotics  -Given pt consistently grows staph epi, will c/w vanco, may need JONATAN for evaluation  -Repeat blood cx drawn 11/30    #Endocrine:   -On tube feeds, increased yesterday per dietary's recommendations FS q6h, SS,  --172    #DVT PPX: octavio Cantu, PGY3  Emergency Medicine  [id number] 47M with hx of sarcoidosis, started on Eqliuis for cerebrovascular infarct with PFO on echo and paroxysmal afib in September c/b recurrent hemoptysis (Eliquis d/c'd on 9/20, then restarted between 10/23-27 after hemoptysis improved), d/c'd to rehab then returned 11/9 for recurrent hemoptysis requiring intubation and CTICU admission for management of hemoptysis including IR embolization (11/16) and serial bronch (11/17 and 11/20) in CTICU with no active bleeding visualized, now remains intubated with difficulty weaning vent as during CPAP trials pt fails to initiate breaths, unclear etiology. Transferred to MICU from CTICU for continued vent management and attempted weaning. Bedside trach placed 11/24 c/b episode of hypoxia and bleeding around trach site after suctioning, resolved after surgicel and pressure. Has been febrile intermittently, though improving, grew Klebsiella on urine cx, blood cx with staph epi. Now with recurrent bloody secretions from trach s/p rpt bronch 11/30, spiked another temp overnight (102.7) and repeat cultures drawn.     # Neuro:  -back on sedation this morning  -able to move all extremities  -PT for increase mobilization    #Cardiovascular:  -back on pressors, transiently on pressors yesterday kacy-bronch    #Respiratory:  -Trach placed 8 days ago at bedside  -L upper anterior pigtail placed with improvement in pulmonary function and moderate re-expansion of the lung.  -Continue bronchodilators and pulmonary toilet  -prednisone decreased yesterday to 20mg  -Recurrent bloody secretions from trach  -Pending CT neck/chest today--showing possible pseudoaneurysm; will review with radiology    #GI:  -on tube feeds, may need evaluation for PEG    #Renal:  -Monitor I's and O's    #Heme:  -H/H stable; baseline Hb 9-10;   -thrombocytopenia improving vs acute phase reactant to infection    #ID:  -Aspergilloma dx 9/24/17, on voriconazole x 6 weeks per ID recommendations. Recommend continuing voriconazole given hemoptysis recurred after was stopped, possible related to inflammation 2/2 aspergilloma and possible continued infection.  -Recultured and started on antibiotics 11/26 for fevers up to 103F. Afebrile x 24 hours now.  -UA grossly positive, pt grew Klebsiella 100K colonies. Pansensitive. Deescalated zosyn to ceftriaxone, now re-escalated to meropenem in setting of recurrent fever with antibiotics  -Given pt consistently grows staph epi, will c/w vanco, may need JONATAN for evaluation  -Repeat blood cx drawn 11/30    #Endocrine:   -On tube feeds, increased per dietary's recommendations FS q6h, SS,  --172    #DVT PPX: octavio Cantu, PGY3  Emergency Medicine  [id number]

## 2017-12-01 NOTE — PROGRESS NOTE ADULT - PROBLEM SELECTOR PLAN 2
intubated: try to titrate as tolerated!  11/18 on ventilator support. Pt is sedated and paralyzed. defer ventilator Management/ weaning per CTICU team.  11/19 still sedated and paralyzed. Plan to wean off Nimbex today per Primary RN. ABGs reviewed.  11/20:ff paralyzing agent! But sedated !!  11/21: awake and alert: responding to simple commands: weaning trials  11/22: currently sedated: ? trach now?  11/23: for trach in AM  11/24: trach today  11/25: S/P trach yesterday: chest x-ray noted: Cont full vent support and wean if tolerates  11/26: [id number] with trach and full mechanical ventilation: Discussed with MICU attending in detail: for rpt ct scan chest  11/28: rpt ct scan c hst reviewed: for possible chest tube on rt side  11/30: on full vent support with weaning trials  12/01:cont weaning trails as tolerated; prednisone dose lowered!!

## 2017-12-02 LAB
ALBUMIN SERPL ELPH-MCNC: 2.3 G/DL — LOW (ref 3.3–5)
ALP SERPL-CCNC: 68 U/L — SIGNIFICANT CHANGE UP (ref 40–120)
ALT FLD-CCNC: 11 U/L — SIGNIFICANT CHANGE UP (ref 4–41)
AST SERPL-CCNC: 18 U/L — SIGNIFICANT CHANGE UP (ref 4–40)
BASOPHILS # BLD AUTO: 0.01 K/UL — SIGNIFICANT CHANGE UP (ref 0–0.2)
BASOPHILS NFR BLD AUTO: 0.1 % — SIGNIFICANT CHANGE UP (ref 0–2)
BILIRUB SERPL-MCNC: 0.2 MG/DL — SIGNIFICANT CHANGE UP (ref 0.2–1.2)
BLD GP AB SCN SERPL QL: NEGATIVE — SIGNIFICANT CHANGE UP
BUN SERPL-MCNC: 12 MG/DL — SIGNIFICANT CHANGE UP (ref 7–23)
CALCIUM SERPL-MCNC: 8.5 MG/DL — SIGNIFICANT CHANGE UP (ref 8.4–10.5)
CHLORIDE SERPL-SCNC: 97 MMOL/L — LOW (ref 98–107)
CO2 SERPL-SCNC: 40 MMOL/L — HIGH (ref 22–31)
CREAT SERPL-MCNC: 0.79 MG/DL — SIGNIFICANT CHANGE UP (ref 0.5–1.3)
EOSINOPHIL # BLD AUTO: 0.02 K/UL — SIGNIFICANT CHANGE UP (ref 0–0.5)
EOSINOPHIL NFR BLD AUTO: 0.2 % — SIGNIFICANT CHANGE UP (ref 0–6)
GLUCOSE BLDC GLUCOMTR-MCNC: 121 MG/DL — HIGH (ref 70–99)
GLUCOSE BLDC GLUCOMTR-MCNC: 123 MG/DL — HIGH (ref 70–99)
GLUCOSE BLDC GLUCOMTR-MCNC: 130 MG/DL — HIGH (ref 70–99)
GLUCOSE BLDC GLUCOMTR-MCNC: 154 MG/DL — HIGH (ref 70–99)
GLUCOSE SERPL-MCNC: 140 MG/DL — HIGH (ref 70–99)
HCT VFR BLD CALC: 23.5 % — LOW (ref 39–50)
HGB BLD-MCNC: 7.5 G/DL — LOW (ref 13–17)
IMM GRANULOCYTES # BLD AUTO: 0.14 # — SIGNIFICANT CHANGE UP
IMM GRANULOCYTES NFR BLD AUTO: 1.1 % — SIGNIFICANT CHANGE UP (ref 0–1.5)
LYMPHOCYTES # BLD AUTO: 1.51 K/UL — SIGNIFICANT CHANGE UP (ref 1–3.3)
LYMPHOCYTES # BLD AUTO: 11.9 % — LOW (ref 13–44)
MAGNESIUM SERPL-MCNC: 2.1 MG/DL — SIGNIFICANT CHANGE UP (ref 1.6–2.6)
MCHC RBC-ENTMCNC: 31.6 PG — SIGNIFICANT CHANGE UP (ref 27–34)
MCHC RBC-ENTMCNC: 31.9 % — LOW (ref 32–36)
MCV RBC AUTO: 99.2 FL — SIGNIFICANT CHANGE UP (ref 80–100)
MONOCYTES # BLD AUTO: 1.5 K/UL — HIGH (ref 0–0.9)
MONOCYTES NFR BLD AUTO: 11.8 % — SIGNIFICANT CHANGE UP (ref 2–14)
NEUTROPHILS # BLD AUTO: 9.51 K/UL — HIGH (ref 1.8–7.4)
NEUTROPHILS NFR BLD AUTO: 74.9 % — SIGNIFICANT CHANGE UP (ref 43–77)
NRBC # FLD: 0.02 — SIGNIFICANT CHANGE UP
PHOSPHATE SERPL-MCNC: 3.4 MG/DL — SIGNIFICANT CHANGE UP (ref 2.5–4.5)
PLATELET # BLD AUTO: 156 K/UL — SIGNIFICANT CHANGE UP (ref 150–400)
PMV BLD: 13.6 FL — HIGH (ref 7–13)
POTASSIUM SERPL-MCNC: 4 MMOL/L — SIGNIFICANT CHANGE UP (ref 3.5–5.3)
POTASSIUM SERPL-SCNC: 4 MMOL/L — SIGNIFICANT CHANGE UP (ref 3.5–5.3)
PROT SERPL-MCNC: 5.5 G/DL — LOW (ref 6–8.3)
RBC # BLD: 2.37 M/UL — LOW (ref 4.2–5.8)
RBC # FLD: 15.9 % — HIGH (ref 10.3–14.5)
RH IG SCN BLD-IMP: POSITIVE — SIGNIFICANT CHANGE UP
SODIUM SERPL-SCNC: 144 MMOL/L — SIGNIFICANT CHANGE UP (ref 135–145)
SPECIMEN SOURCE: SIGNIFICANT CHANGE UP
SPECIMEN SOURCE: SIGNIFICANT CHANGE UP
WBC # BLD: 12.69 K/UL — HIGH (ref 3.8–10.5)
WBC # FLD AUTO: 12.69 K/UL — HIGH (ref 3.8–10.5)

## 2017-12-02 PROCEDURE — 76937 US GUIDE VASCULAR ACCESS: CPT | Mod: 26

## 2017-12-02 PROCEDURE — 71010: CPT | Mod: 26

## 2017-12-02 PROCEDURE — 99232 SBSQ HOSP IP/OBS MODERATE 35: CPT

## 2017-12-02 PROCEDURE — 99291 CRITICAL CARE FIRST HOUR: CPT

## 2017-12-02 RX ORDER — DEXMEDETOMIDINE HYDROCHLORIDE IN 0.9% SODIUM CHLORIDE 4 UG/ML
0.3 INJECTION INTRAVENOUS
Qty: 200 | Refills: 0 | Status: DISCONTINUED | OUTPATIENT
Start: 2017-12-02 | End: 2017-12-03

## 2017-12-02 RX ADMIN — BUDESONIDE AND FORMOTEROL FUMARATE DIHYDRATE 2 PUFF(S): 160; 4.5 AEROSOL RESPIRATORY (INHALATION) at 22:31

## 2017-12-02 RX ADMIN — DEXMEDETOMIDINE HYDROCHLORIDE IN 0.9% SODIUM CHLORIDE 5.62 MICROGRAM(S)/KG/HR: 4 INJECTION INTRAVENOUS at 11:02

## 2017-12-02 RX ADMIN — MEROPENEM 100 MILLIGRAM(S): 1 INJECTION INTRAVENOUS at 16:12

## 2017-12-02 RX ADMIN — Medication 1: at 11:17

## 2017-12-02 RX ADMIN — Medication 250 MILLIGRAM(S): at 06:28

## 2017-12-02 RX ADMIN — Medication 500 MILLIGRAM(S): at 06:28

## 2017-12-02 RX ADMIN — SENNA PLUS 10 MILLILITER(S): 8.6 TABLET ORAL at 22:02

## 2017-12-02 RX ADMIN — POLYETHYLENE GLYCOL 3350 17 GRAM(S): 17 POWDER, FOR SOLUTION ORAL at 22:02

## 2017-12-02 RX ADMIN — ALBUTEROL 2 PUFF(S): 90 AEROSOL, METERED ORAL at 04:38

## 2017-12-02 RX ADMIN — CHLORHEXIDINE GLUCONATE 1 APPLICATION(S): 213 SOLUTION TOPICAL at 11:18

## 2017-12-02 RX ADMIN — MEROPENEM 100 MILLIGRAM(S): 1 INJECTION INTRAVENOUS at 06:48

## 2017-12-02 RX ADMIN — BUDESONIDE AND FORMOTEROL FUMARATE DIHYDRATE 2 PUFF(S): 160; 4.5 AEROSOL RESPIRATORY (INHALATION) at 09:29

## 2017-12-02 RX ADMIN — VORICONAZOLE 125 MILLIGRAM(S): 10 INJECTION, POWDER, LYOPHILIZED, FOR SOLUTION INTRAVENOUS at 23:46

## 2017-12-02 RX ADMIN — ALBUTEROL 2 PUFF(S): 90 AEROSOL, METERED ORAL at 22:31

## 2017-12-02 RX ADMIN — VORICONAZOLE 125 MILLIGRAM(S): 10 INJECTION, POWDER, LYOPHILIZED, FOR SOLUTION INTRAVENOUS at 12:53

## 2017-12-02 RX ADMIN — Medication 1 MILLIGRAM(S): at 11:18

## 2017-12-02 RX ADMIN — Medication 1 PUFF(S): at 16:00

## 2017-12-02 RX ADMIN — Medication 1.41 MICROGRAM(S)/KG/MIN: at 06:48

## 2017-12-02 RX ADMIN — DEXMEDETOMIDINE HYDROCHLORIDE IN 0.9% SODIUM CHLORIDE 5.62 MICROGRAM(S)/KG/HR: 4 INJECTION INTRAVENOUS at 15:57

## 2017-12-02 RX ADMIN — MEROPENEM 100 MILLIGRAM(S): 1 INJECTION INTRAVENOUS at 22:02

## 2017-12-02 RX ADMIN — Medication 500 MILLIGRAM(S): at 17:15

## 2017-12-02 RX ADMIN — Medication 20 MILLIGRAM(S): at 06:28

## 2017-12-02 RX ADMIN — Medication 250 MILLIGRAM(S): at 22:02

## 2017-12-02 RX ADMIN — Medication 1 PUFF(S): at 09:28

## 2017-12-02 RX ADMIN — ENOXAPARIN SODIUM 40 MILLIGRAM(S): 100 INJECTION SUBCUTANEOUS at 11:18

## 2017-12-02 RX ADMIN — Medication 250 MILLIGRAM(S): at 15:14

## 2017-12-02 RX ADMIN — PROPOFOL 22.5 MICROGRAM(S)/KG/MIN: 10 INJECTION, EMULSION INTRAVENOUS at 06:48

## 2017-12-02 RX ADMIN — ALBUTEROL 2 PUFF(S): 90 AEROSOL, METERED ORAL at 09:28

## 2017-12-02 RX ADMIN — Medication 1 PUFF(S): at 04:38

## 2017-12-02 RX ADMIN — Medication 1 PUFF(S): at 22:31

## 2017-12-02 RX ADMIN — ALBUTEROL 2 PUFF(S): 90 AEROSOL, METERED ORAL at 16:00

## 2017-12-02 RX ADMIN — Medication 300 MILLIGRAM(S): at 11:18

## 2017-12-02 NOTE — PROGRESS NOTE ADULT - ATTENDING COMMENTS
46 yo with sarcoidosis admitted with hemoptysis 2/2 aspergilloma s/p DAVID resection in September 2017,  s/p recurrent hemoptysis s/p IR embolization of left bronchial artery on this admission, s/p trach for inability to wean off ventilator, s/p left chest tube 2/2 PTX, now with intermittently recurrent tracheal bleeding suspected to be from a thyroid artery. Bleeding has stabilized; has not required transfusion. Will attempt to wean off sedation. Daily weaning trials. Clamp chest tube. GI to evaluate for PEG.    45 minutes critical time spent.

## 2017-12-02 NOTE — CHART NOTE - NSCHARTNOTEFT_GEN_A_CORE
Critically ill pt requring PIV access for medical management and/or pressor support. Under US guidance identified Lt UE vein, prox to AC and successfully placed 20g x 1.88 inch angiocath into vessel. . Placement confirmed s/p with ultrasound and catheter determined to be in patent lumen of vein. Pt tolerated well w/o complication.     Windy Peralta PA-C

## 2017-12-02 NOTE — PROGRESS NOTE ADULT - PROBLEM SELECTOR PLAN 2
intubated: try to titrate as tolerated!  11/18 on ventilator support. Pt is sedated and paralyzed. defer ventilator Management/ weaning per CTICU team.  11/19 still sedated and paralyzed. Plan to wean off Nimbex today per Primary RN. ABGs reviewed.  11/20:ff paralyzing agent! But sedated !!  11/21: awake and alert: responding to simple commands: weaning trials  11/22: currently sedated: ? trach now?  11/23: for trach in AM  11/24: trach today  11/25: S/P trach yesterday: chest x-ray noted: Cont full vent support and wean if tolerates  11/26: [id number] with trach and full mechanical ventilation: Discussed with MICU attending in detail: for rpt ct scan chest  11/28: rpt ct scan c hst reviewed: for possible chest tube on rt side  11/30: on full vent support with weaning trials  12/01:cont weaning trails as tolerated; prednisone dose lowered!!  12/2 on CPAP trial. Continue wean off as tolerate

## 2017-12-02 NOTE — PROGRESS NOTE ADULT - SUBJECTIVE AND OBJECTIVE BOX
CHIEF COMPLAINT:    Interval Events:    REVIEW OF SYSTEMS:  Constitutional: [ ] negative [ ] fevers [ ] chills [ ] weight loss [ ] weight gain  HEENT: [ ] negative [ ] dry eyes [ ] eye irritation [ ] postnasal drip [ ] nasal congestion  CV: [ ] negative  [ ] chest pain [ ] orthopnea [ ] palpitations [ ] murmur  Resp: [ ] negative [ ] cough [ ] shortness of breath [ ] dyspnea [ ] wheezing [ ] sputum [ ] hemoptysis  GI: [ ] negative [ ] nausea [ ] vomiting [ ] diarrhea [ ] constipation [ ] abd pain [ ] dysphagia   : [ ] negative [ ] dysuria [ ] nocturia [ ] hematuria [ ] increased urinary frequency  Musculoskeletal: [ ] negative [ ] back pain [ ] myalgias [ ] arthralgias [ ] fracture  Skin: [ ] negative [ ] rash [ ] itch  Neurological: [ ] negative [ ] headache [ ] dizziness [ ] syncope [ ] weakness [ ] numbness  Psychiatric: [ ] negative [ ] anxiety [ ] depression  Endocrine: [ ] negative [ ] diabetes [ ] thyroid problem  Hematologic/Lymphatic: [ ] negative [ ] anemia [ ] bleeding problem  Allergic/Immunologic: [ ] negative [ ] itchy eyes [ ] nasal discharge [ ] hives [ ] angioedema  [ ] All other systems negative  [ ] Unable to assess ROS because ________    OBJECTIVE:  ICU Vital Signs Last 24 Hrs  T(C): 37.9 (02 Dec 2017 00:00), Max: 37.9 (02 Dec 2017 00:00)  T(F): 100.3 (02 Dec 2017 00:00), Max: 100.3 (02 Dec 2017 00:00)  HR: 106 (02 Dec 2017 07:00) (53 - 115)  BP: 118/80 (02 Dec 2017 07:00) (92/65 - 151/82)  BP(mean): 87 (02 Dec 2017 07:00) (68 - 107)  ABP: --  ABP(mean): --  RR: 22 (02 Dec 2017 07:00) (11 - 26)  SpO2: 100% (02 Dec 2017 07:00) (99% - 100%)    Mode: AC/ CMV (Assist Control/ Continuous Mandatory Ventilation), RR (machine): 12, TV (machine): 500, FiO2: 40, PEEP: 8, MAP: 15, PIP: 38     @ 07:01  -  -02 @ 07:00  --------------------------------------------------------  IN: 2896.5 mL / OUT: 1360 mL / NET: 1536.5 mL      CAPILLARY BLOOD GLUCOSE      POCT Blood Glucose.: 130 mg/dL (02 Dec 2017 06:45)      PHYSICAL EXAM:  General:   HEENT:   Lymph Nodes:  Neck:   Respiratory:   Cardiovascular:   Abdomen:   Extremities:   Skin:   Neurological:  Psychiatry:    LINES:    HOSPITAL MEDICATIONS:  Standing Meds:  ALBUTerol    90 MICROgram(s) HFA Inhaler 2 Puff(s) Inhalation every 6 hours  buDESOnide 160 MICROgram(s)/formoterol 4.5 MICROgram(s) Inhaler 2 Puff(s) Inhalation two times a day  chlorhexidine 4% Liquid 1 Application(s) Topical daily  enoxaparin Injectable 40 milliGRAM(s) SubCutaneous daily  fentaNYL   Patch 100 MICROgram(s)/Hr. 1 Patch Transdermal every 48 hours  ferrous    sulfate Liquid 300 milliGRAM(s) Enteral Tube daily  folic acid 1 milliGRAM(s) Oral daily  insulin lispro (HumaLOG) corrective regimen sliding scale   SubCutaneous every 6 hours  ipratropium 17 MICROgram(s) HFA Inhaler 1 Puff(s) Inhalation every 6 hours  meropenem IVPB      meropenem IVPB 1000 milliGRAM(s) IV Intermittent every 8 hours  norepinephrine Infusion 0.01 MICROgram(s)/kG/Min IV Continuous <Continuous>  polyethylene glycol 3350 17 Gram(s) Oral at bedtime  predniSONE   Tablet 20 milliGRAM(s) Oral daily  propofol Infusion 50 MICROgram(s)/kG/Min IV Continuous <Continuous>  senna Syrup 10 milliLiter(s) Oral at bedtime  valproic  acid Syrup 500 milliGRAM(s) Oral two times a day  vancomycin  IVPB 1000 milliGRAM(s) IV Intermittent every 8 hours  voriconazole IVPB 300 milliGRAM(s) IV Intermittent every 12 hours      PRN Meds:  acetaminophen   Tablet 650 milliGRAM(s) Oral every 6 hours PRN      LABS:                        7.5    12.69 )-----------( 156      ( 02 Dec 2017 02:28 )             23.5     Hgb Trend: 7.5<--, 7.4<--, 8.3<--, 7.8<--, 7.2<--  12-02    144  |  97<L>  |  12  ----------------------------<  140<H>  4.0   |  40<H>  |  0.79    Ca    8.5      02 Dec 2017 02:38  Phos  3.4     12  Mg     2.1     12    TPro  5.5<L>  /  Alb  2.3<L>  /  TBili  0.2  /  DBili  x   /  AST  18  /  ALT  11  /  AlkPhos  68  12    Creatinine Trend: 0.79<--, 0.74<--, 0.70<--, 0.57<--, 0.73<--, 0.66<--    Urinalysis Basic - ( 01 Dec 2017 05:10 )    Color: YELLOW / Appearance: HAZY / S.010 / pH: 7.0  Gluc: NEGATIVE / Ketone: NEGATIVE  / Bili: NEGATIVE / Urobili: NORMAL E.U.   Blood: NEGATIVE / Protein: 30 / Nitrite: NEGATIVE   Leuk Esterase: SMALL / RBC: 5-10 / WBC 10-25   Sq Epi: OCC / Non Sq Epi: x / Bacteria: x      Arterial Blood Gas:   @ 04:00  7.45/62/129/40/99.2/16.7  ABG lactate: 0.8        MICROBIOLOGY:     Culture - Blood (collected 01 Dec 2017 06:18)  Source: BLOOD VENOUS  Preliminary Report (02 Dec 2017 06:18):    NO ORGANISMS ISOLATED    NO ORGANISMS ISOLATED AT 24 HOURS    Culture - Blood (collected 01 Dec 2017 06:18)  Source: BLOOD PERIPHERAL  Preliminary Report (02 Dec 2017 06:18):    NO ORGANISMS ISOLATED    NO ORGANISMS ISOLATED AT 24 HOURS    Culture - Blood (collected 2017 15:21)  Source: BLOOD VENOUS  Preliminary Report (01 Dec 2017 15:21):    NO ORGANISMS ISOLATED    NO ORGANISMS ISOLATED AT 48 HRS.      RADIOLOGY:  [ ] Reviewed and interpreted by me    EKG: CHIEF COMPLAINT: Hemoptysis    Interval Events: no overnight events    REVIEW OF SYSTEMS:  Constitutional: [ x] negative [ ] fevers [ ] chills [ ] weight loss [ ] weight gain  HEENT: [x ] negative [ ] dry eyes [ ] eye irritation [ ] postnasal drip [ ] nasal congestion  CV: [x ] negative  [ ] chest pain [ ] orthopnea [ ] palpitations [ ] murmur  Resp: [ ] negative [ ] cough [x ] shortness of breath [ ] dyspnea [ ] wheezing [ ] sputum [ ] hemoptysis  GI: [x ] negative [ ] nausea [ ] vomiting [ ] diarrhea [ ] constipation [ ] abd pain [ ] dysphagia   : [x ] negative [ ] dysuria [ ] nocturia [ ] hematuria [ ] increased urinary frequency  Musculoskeletal: [ x] negative [ ] back pain [ ] myalgias [ ] arthralgias [ ] fracture  Skin: [x ] negative [ ] rash [ ] itch  Neurological: [x] negative [ ] headache [ ] dizziness [ ] syncope [ ] weakness [ ] numbness  Psychiatric: [x ] negative [ ] anxiety [ ] depression  Endocrine: [ x] negative [ ] diabetes [ ] thyroid problem  Hematologic/Lymphatic: [ ] negative [ ] anemia [ ] bleeding problem  Allergic/Immunologic: [ ] negative [ ] itchy eyes [ ] nasal discharge [ ] hives [ ] angioedema  [ ] All other systems negative  [ ] Unable to assess ROS because ________    OBJECTIVE:  ICU Vital Signs Last 24 Hrs  T(C): 37.9 (02 Dec 2017 00:00), Max: 37.9 (02 Dec 2017 00:00)  T(F): 100.3 (02 Dec 2017 00:00), Max: 100.3 (02 Dec 2017 00:00)  HR: 106 (02 Dec 2017 07:00) (53 - 115)  BP: 118/80 (02 Dec 2017 07:00) (92/65 - 151/82)  BP(mean): 87 (02 Dec 2017 07:00) (68 - 107)  ABP: --  ABP(mean): --  RR: 22 (02 Dec 2017 07:00) (11 - 26)  SpO2: 100% (02 Dec 2017 07:00) (99% - 100%)    Mode: AC/ CMV (Assist Control/ Continuous Mandatory Ventilation), RR (machine): 12, TV (machine): 500, FiO2: 40, PEEP: 8, MAP: 15, PIP: 38     @ 07:01  -   @ 07:00  --------------------------------------------------------  IN: 2896.5 mL / OUT: 1360 mL / NET: 1536.5 mL      CAPILLARY BLOOD GLUCOSE      POCT Blood Glucose.: 130 mg/dL (02 Dec 2017 06:45)      PHYSICAL EXAM:  General:   HEENT:   Lymph Nodes:  Neck:   Respiratory:   Cardiovascular:   Abdomen:   Extremities:   Skin:   Neurological:  Psychiatry:    LINES:    HOSPITAL MEDICATIONS:  Standing Meds:  ALBUTerol    90 MICROgram(s) HFA Inhaler 2 Puff(s) Inhalation every 6 hours  buDESOnide 160 MICROgram(s)/formoterol 4.5 MICROgram(s) Inhaler 2 Puff(s) Inhalation two times a day  chlorhexidine 4% Liquid 1 Application(s) Topical daily  enoxaparin Injectable 40 milliGRAM(s) SubCutaneous daily  fentaNYL   Patch 100 MICROgram(s)/Hr. 1 Patch Transdermal every 48 hours  ferrous    sulfate Liquid 300 milliGRAM(s) Enteral Tube daily  folic acid 1 milliGRAM(s) Oral daily  insulin lispro (HumaLOG) corrective regimen sliding scale   SubCutaneous every 6 hours  ipratropium 17 MICROgram(s) HFA Inhaler 1 Puff(s) Inhalation every 6 hours  meropenem IVPB      meropenem IVPB 1000 milliGRAM(s) IV Intermittent every 8 hours  norepinephrine Infusion 0.01 MICROgram(s)/kG/Min IV Continuous <Continuous>  polyethylene glycol 3350 17 Gram(s) Oral at bedtime  predniSONE   Tablet 20 milliGRAM(s) Oral daily  propofol Infusion 50 MICROgram(s)/kG/Min IV Continuous <Continuous>  senna Syrup 10 milliLiter(s) Oral at bedtime  valproic  acid Syrup 500 milliGRAM(s) Oral two times a day  vancomycin  IVPB 1000 milliGRAM(s) IV Intermittent every 8 hours  voriconazole IVPB 300 milliGRAM(s) IV Intermittent every 12 hours      PRN Meds:  acetaminophen   Tablet 650 milliGRAM(s) Oral every 6 hours PRN      LABS:                        7.5    12.69 )-----------( 156      ( 02 Dec 2017 02:28 )             23.5     Hgb Trend: 7.5<--, 7.4<--, 8.3<--, 7.8<--, 7.2<--  12-02    144  |  97<L>  |  12  ----------------------------<  140<H>  4.0   |  40<H>  |  0.79    Ca    8.5      02 Dec 2017 02:38  Phos  3.4     12  Mg     2.1     12    TPro  5.5<L>  /  Alb  2.3<L>  /  TBili  0.2  /  DBili  x   /  AST  18  /  ALT  11  /  AlkPhos  68  12    Creatinine Trend: 0.79<--, 0.74<--, 0.70<--, 0.57<--, 0.73<--, 0.66<--    Urinalysis Basic - ( 01 Dec 2017 05:10 )    Color: YELLOW / Appearance: HAZY / S.010 / pH: 7.0  Gluc: NEGATIVE / Ketone: NEGATIVE  / Bili: NEGATIVE / Urobili: NORMAL E.U.   Blood: NEGATIVE / Protein: 30 / Nitrite: NEGATIVE   Leuk Esterase: SMALL / RBC: 5-10 / WBC 10-25   Sq Epi: OCC / Non Sq Epi: x / Bacteria: x      Arterial Blood Gas:   @ 04:00  7.45/62/129/40/99.2/16.7  ABG lactate: 0.8        MICROBIOLOGY:     Culture - Blood (collected 01 Dec 2017 06:18)  Source: BLOOD VENOUS  Preliminary Report (02 Dec 2017 06:18):    NO ORGANISMS ISOLATED    NO ORGANISMS ISOLATED AT 24 HOURS    Culture - Blood (collected 01 Dec 2017 06:18)  Source: BLOOD PERIPHERAL  Preliminary Report (02 Dec 2017 06:18):    NO ORGANISMS ISOLATED    NO ORGANISMS ISOLATED AT 24 HOURS    Culture - Blood (collected 2017 15:21)  Source: BLOOD VENOUS  Preliminary Report (01 Dec 2017 15:21):    NO ORGANISMS ISOLATED    NO ORGANISMS ISOLATED AT 48 HRS.      RADIOLOGY:  [ ] Reviewed and interpreted by me    EKG: CHIEF COMPLAINT: Hemoptysis    Interval Events: no overnight events    REVIEW OF SYSTEMS:  Constitutional: [ x] negative [ ] fevers [ ] chills [ ] weight loss [ ] weight gain  HEENT: [x ] negative [ ] dry eyes [ ] eye irritation [ ] postnasal drip [ ] nasal congestion  CV: [x ] negative  [ ] chest pain [ ] orthopnea [ ] palpitations [ ] murmur  Resp: [ ] negative [ ] cough [x ] shortness of breath [ ] dyspnea [ ] wheezing [ ] sputum [ ] hemoptysis  GI: [x ] negative [ ] nausea [ ] vomiting [ ] diarrhea [ ] constipation [ ] abd pain [ ] dysphagia   : [x ] negative [ ] dysuria [ ] nocturia [ ] hematuria [ ] increased urinary frequency  Musculoskeletal: [ x] negative [ ] back pain [ ] myalgias [ ] arthralgias [ ] fracture  Skin: [x ] negative [ ] rash [ ] itch  Neurological: [x] negative [ ] headache [ ] dizziness [ ] syncope [ ] weakness [ ] numbness  Psychiatric: [x ] negative [ ] anxiety [ ] depression  Endocrine: [ x] negative [ ] diabetes [ ] thyroid problem  Hematologic/Lymphatic: [ ] negative [ ] anemia [ ] bleeding problem  Allergic/Immunologic: [ ] negative [ ] itchy eyes [ ] nasal discharge [ ] hives [ ] angioedema  [ ] All other systems negative  [ ] Unable to assess ROS because ________    OBJECTIVE:  ICU Vital Signs Last 24 Hrs  T(C): 37.9 (02 Dec 2017 00:00), Max: 37.9 (02 Dec 2017 00:00)  T(F): 100.3 (02 Dec 2017 00:00), Max: 100.3 (02 Dec 2017 00:00)  HR: 106 (02 Dec 2017 07:00) (53 - 115)  BP: 118/80 (02 Dec 2017 07:00) (92/65 - 151/82)  BP(mean): 87 (02 Dec 2017 07:00) (68 - 107)  ABP: --  ABP(mean): --  RR: 22 (02 Dec 2017 07:00) (11 - 26)  SpO2: 100% (02 Dec 2017 07:00) (99% - 100%)    Mode: AC/ CMV (Assist Control/ Continuous Mandatory Ventilation), RR (machine): 12, TV (machine): 500, FiO2: 40, PEEP: 8, MAP: 15, PIP: 38     @ 07:01  -   @ 07:00  --------------------------------------------------------  IN: 2896.5 mL / OUT: 1360 mL / NET: 1536.5 mL      CAPILLARY BLOOD GLUCOSE      POCT Blood Glucose.: 130 mg/dL (02 Dec 2017 06:45)      PHYSICAL EXAM:  General: ill appearing, sedated, not following commands this morning  HEENT: trach in place  Lymph Nodes: no lymphadenopathy  Neck: trach in place with some brown secretions around it  Respiratory: b/l rhochi at baseline  Cardiovascular: tachycardic  Abdomen: soft, NTND  Extremities: warm, well perfused, no edema  Skin: intact  Neurological: unable to assess  Psychiatry: unable to assess    LINES: [facility] [place], [facility]    HOSPITAL MEDICATIONS:  Standing Meds:  ALBUTerol    90 MICROgram(s) HFA Inhaler 2 Puff(s) Inhalation every 6 hours  buDESOnide 160 MICROgram(s)/formoterol 4.5 MICROgram(s) Inhaler 2 Puff(s) Inhalation two times a day  chlorhexidine 4% Liquid 1 Application(s) Topical daily  enoxaparin Injectable 40 milliGRAM(s) SubCutaneous daily  fentaNYL   Patch 100 MICROgram(s)/Hr. 1 Patch Transdermal every 48 hours  ferrous    sulfate Liquid 300 milliGRAM(s) Enteral Tube daily  folic acid 1 milliGRAM(s) Oral daily  insulin lispro (HumaLOG) corrective regimen sliding scale   SubCutaneous every 6 hours  ipratropium 17 MICROgram(s) HFA Inhaler 1 Puff(s) Inhalation every 6 hours  meropenem IVPB      meropenem IVPB 1000 milliGRAM(s) IV Intermittent every 8 hours  norepinephrine Infusion 0.01 MICROgram(s)/kG/Min IV Continuous <Continuous>  polyethylene glycol 3350 17 Gram(s) Oral at bedtime  predniSONE   Tablet 20 milliGRAM(s) Oral daily  propofol Infusion 50 MICROgram(s)/kG/Min IV Continuous <Continuous>  senna Syrup 10 milliLiter(s) Oral at bedtime  valproic  acid Syrup 500 milliGRAM(s) Oral two times a day  vancomycin  IVPB 1000 milliGRAM(s) IV Intermittent every 8 hours  voriconazole IVPB 300 milliGRAM(s) IV Intermittent every 12 hours      PRN Meds:  acetaminophen   Tablet 650 milliGRAM(s) Oral every 6 hours PRN      LABS:                        7.5    12.69 )-----------( 156      ( 02 Dec 2017 02:28 )             23.5     Hgb Trend: 7.5<--, 7.4<--, 8.3<--, 7.8<--, 7.2<--  12-02    144  |  97<L>  |  12  ----------------------------<  140<H>  4.0   |  40<H>  |  0.79    Ca    8.5      02 Dec 2017 02:38  Phos  3.4     12  Mg     2.1     12    TPro  5.5<L>  /  Alb  2.3<L>  /  TBili  0.2  /  DBili  x   /  AST  18  /  ALT  11  /  AlkPhos  68  1202    Creatinine Trend: 0.79<--, 0.74<--, 0.70<--, 0.57<--, 0.73<--, 0.66<--    Urinalysis Basic - ( 01 Dec 2017 05:10 )    Color: YELLOW / Appearance: HAZY / S.010 / pH: 7.0  Gluc: NEGATIVE / Ketone: NEGATIVE  / Bili: NEGATIVE / Urobili: NORMAL E.U.   Blood: NEGATIVE / Protein: 30 / Nitrite: NEGATIVE   Leuk Esterase: SMALL / RBC: 5-10 / WBC 10-25   Sq Epi: OCC / Non Sq Epi: x / Bacteria: x      Arterial Blood Gas:   @ 04:00  7.45/62/129/40/99.2/16.7  ABG lactate: 0.8        MICROBIOLOGY:     Culture - Blood (collected 01 Dec 2017 06:18)  Source: BLOOD VENOUS  Preliminary Report (02 Dec 2017 06:18):    NO ORGANISMS ISOLATED    NO ORGANISMS ISOLATED AT 24 HOURS    Culture - Blood (collected 01 Dec 2017 06:18)  Source: BLOOD PERIPHERAL  Preliminary Report (02 Dec 2017 06:18):    NO ORGANISMS ISOLATED    NO ORGANISMS ISOLATED AT 24 HOURS    Culture - Blood (collected 2017 15:21)  Source: BLOOD VENOUS  Preliminary Report (01 Dec 2017 15:21):    NO ORGANISMS ISOLATED    NO ORGANISMS ISOLATED AT 48 HRS.      RADIOLOGY:  [ ] Reviewed and interpreted by me    EKG:

## 2017-12-02 NOTE — PROGRESS NOTE ADULT - PROBLEM SELECTOR PLAN 3
? 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 ot from bronchiectasis? eliquis have been dced: needs card to se: In addition, he may need IR help with embolization, it his hemoptysis increases: He has had pleurodesis on the left side.  11/10 quantify hemoptysis. no AC. stable  11/11 no more hemoptysis. off AC  11/12 resolved. off AC  11/13: today had hemoptysis again----->localize site---> IR embolization: left a message for thoracic team  11/14: DW Dr Caldera: for repeat bronchoscopy to localize the site of bleeding and need IR help to embolize the culprit vessel!  11/15: started on broad spectrum antibiotics and for bronchoscopy today  11/16: s/p IR emobolization done: monitor for more hemoptysis  11/17: s/p bronchsocopy: reportedly old blood seen in airways!  11/18 s/p bronchoscopy in ICU via ETT. old clot. no active bleeding  11/19 stable. no active bleeding per recent bronchoscopy  11/20; off AC at this time  11/22: resolved: remained off ac  11/23: remained off AC: has hx of PAF  11/24: remains off AC  11/27: no bleeding noted  11/30: no recent  hemoptysis  12/01:so far no more hemoptysis  12/2 resolved

## 2017-12-02 NOTE — PROGRESS NOTE ADULT - ATTENDING COMMENTS
pt remains critically ill, on full vent support: on low dose vasopressors;   on broad spectrum antibiotics::  rpt chest radiograph pending

## 2017-12-02 NOTE — PROGRESS NOTE ADULT - ASSESSMENT
47M with hx of sarcoidosis, started on Eqliuis for cerebrovascular infarct with PFO on echo and paroxysmal afib in September c/b recurrent hemoptysis (Eliquis d/c'd on 9/20, then restarted between 10/23-27 after hemoptysis improved), d/c'd to rehab then returned 11/9 for recurrent hemoptysis requiring intubation and CTICU admission for management of hemoptysis including IR embolization (11/16) and serial bronch (11/17 and 11/20) in CTICU with no active bleeding visualized, now remains intubated with difficulty weaning vent as during CPAP trials pt fails to initiate breaths, unclear etiology. Transferred to MICU from CTICU for continued vent management and attempted weaning. Bedside trach placed 11/24 c/b episode of hypoxia and bleeding around trach site after suctioning, resolved after surgicel and pressure. Has been febrile intermittently, though improving, grew Klebsiella on urine cx, blood cx with staph epi. Now with recurrent bloody secretions from trach s/p rpt bronch 11/30, spiked another temp 12/1/17 (102.7) and repeat cultures drawn.     # Neuro:  -back on sedation this morning  -able to move all extremities  -PT for increase mobilization    #Cardiovascular:  -intermittently on pressors    #Respiratory:  -Trach placed 11/24  -L upper anterior pigtail placed with improvement in pulmonary function and moderate re-expansion of the lung.  -Continue bronchodilators and pulmonary toilet  -prednisone at 20mg  -Recurrent bloody secretions from trach, improving  -CT neck from yesterday with ?vessel irregularity on left adjacent to trach; small branch off thyroid artery (?pseudoaneruysm)     #GI:  -on tube feeds, may need evaluation for PEG    #Renal:  -Monitor I's and O's    #Heme:  -H/H stable; baseline Hb 9-10;     #ID:  -Aspergilloma dx 9/24/17, on voriconazole x 6 weeks per ID recommendations. Recommend continuing voriconazole given hemoptysis recurred after was stopped, possible related to inflammation 2/2 aspergilloma and possible continued infection.  -UA grossly positive, pt grew Klebsiella 100K colonies. Pansensitive. Deescalated zosyn to ceftriaxone, now re-escalated to meropenem in setting of recurrent fever with antibiotics  -Blood cx negative from 11/29 and 11/30; consider d/c vancomycin (grew staph epi in blood from 11/25 and 11/26)--had been started 11/28  -Repeat blood cx drawn 11/30, negative    #Endocrine:   -On tube feeds, increased per dietary's recommendations FS q6h, ISS,    #DVT PPX: octavio Cantu, PGY3  Emergency Medicine  [id number] 47M with hx of sarcoidosis, started on Eqliuis for cerebrovascular infarct with PFO on echo and paroxysmal afib in September c/b recurrent hemoptysis (Eliquis d/c'd on 9/20, then restarted between 10/23-27 after hemoptysis improved), d/c'd to rehab then returned 11/9 for recurrent hemoptysis requiring intubation and CTICU admission for management of hemoptysis including IR embolization (11/16) and serial bronch (11/17 and 11/20) in CTICU with no active bleeding visualized, now remains intubated with difficulty weaning vent as during CPAP trials pt fails to initiate breaths, unclear etiology. Transferred to MICU from CTICU for continued vent management and attempted weaning. Bedside trach placed 11/24 c/b episode of hypoxia and bleeding around trach site after suctioning, resolved after surgicel and pressure. Has been febrile intermittently, though improving, grew Klebsiella on urine cx, blood cx with staph epi. Now with recurrent bloody secretions from trach s/p rpt bronch 11/30, spiked another temp 12/1/17 (102.7) and repeat cultures drawn.     # Neuro:  -back on sedation this morning  -able to move all extremities  -PT for increase mobilization    #Cardiovascular:  -has been intermittently on pressors, currently on     #Respiratory:  -Trach placed 11/24  -L upper anterior pigtail placed with improvement in pulmonary function and moderate re-expansion of the lung.  -Continue bronchodilators and pulmonary toilet  -prednisone at 20mg  -Recurrent bloody secretions from trach, improving  -CT neck from yesterday with ?vessel irregularity on left adjacent to trach; small branch off thyroid artery (?pseudoaneruysm)     #GI:  -on tube feeds, may need evaluation for PEG    #Renal:  -Monitor I's and O's    #Heme:  -H/H stable; baseline Hb 9-10;     #ID:  -Aspergilloma dx 9/24/17, on voriconazole x 6 weeks per ID recommendations. Recommend continuing voriconazole given hemoptysis recurred after was stopped, possible related to inflammation 2/2 aspergilloma and possible continued infection.  -UA grossly positive, pt grew Klebsiella 100K colonies. Pansensitive. Deescalated zosyn to ceftriaxone, now re-escalated to meropenem in setting of recurrent fever with antibiotics  -Blood cx negative from 11/29 and 11/30; consider d/c vancomycin (grew staph epi in blood from 11/25 and 11/26)--had been started 11/28  -Repeat blood cx drawn 11/30, negative    #Endocrine:   -On tube feeds, increased per dietary's recommendations FS q6h, ISS,    #DVT PPX: octavio Cantu, PGY3  Emergency Medicine  [id number] 47M with hx of sarcoidosis, started on Eqliuis for cerebrovascular infarct with PFO on echo and paroxysmal afib in September c/b recurrent hemoptysis (Eliquis d/c'd on 9/20, then restarted between 10/23-27 after hemoptysis improved), d/c'd to rehab then returned 11/9 for recurrent hemoptysis requiring intubation and CTICU admission for management of hemoptysis including IR embolization (11/16) and serial bronch (11/17 and 11/20) in CTICU with no active bleeding visualized, now remains intubated with difficulty weaning vent as during CPAP trials pt fails to initiate breaths, unclear etiology. Transferred to MICU from CTICU for continued vent management and attempted weaning. Bedside trach placed 11/24 c/b episode of hypoxia and bleeding around trach site after suctioning, resolved after surgicel and pressure. Has been febrile intermittently, though improving, grew Klebsiella on urine cx, blood cx with staph epi. Now with recurrent bloody secretions from trach s/p rpt bronch 11/30, spiked another temp 12/1/17 (102.7) and repeat cultures drawn.     # Neuro:  -back on sedation this morning  -able to move all extremities  -PT for increase mobilization  -D/c sedation, precedex if needed    #Cardiovascular:  -has been intermittently on pressors, currently on     #Respiratory:  -Trach placed 11/24  -L upper anterior pigtail placed with improvement in pulmonary function and moderate re-expansion of the lung.  -Continue bronchodilators and pulmonary toilet  -prednisone at 20mg  -Recurrent bloody secretions from trach, improving  -CT neck from yesterday with ?vessel irregularity on left adjacent to trach; small branch off thyroid artery (?pseudoaneruysm)   -Pigtail clamped today; monitor peak pressures, rpt CXR/US  -CPAP trial    #GI:  -on tube feeds, may need evaluation for PEG    #Renal:  -Monitor I's and O's    #Heme:  -H/H stable; baseline Hb 9-10;     #ID:  -Aspergilloma dx 9/24/17, on voriconazole x 6 weeks per ID recommendations. Recommend continuing voriconazole given hemoptysis recurred after was stopped, possible related to inflammation 2/2 aspergilloma and possible continued infection.  -UA grossly positive, pt grew Klebsiella 100K colonies. Pansensitive. Deescalated zosyn to ceftriaxone, now re-escalated to meropenem in setting of recurrent fever with antibiotics  -Blood cx negative from 11/29 and 11/30; consider d/c vancomycin (grew staph epi in blood from 11/25 and 11/26)--had been started 11/28  -Repeat blood cx drawn 11/30, negative  -Finish abx tomorrow    #Endocrine:   -On tube feeds, increased per dietary's recommendations FS q6h, ISS,    #DVT PPX: octavio Cantu, PGY3  Emergency Medicine  [id number]

## 2017-12-02 NOTE — PROGRESS NOTE ADULT - ASSESSMENT
47 year old with sarcoidosis and COPD presents after recent andree surgery with recurrent hemoptysis.     This is liklely multifactorial- Cystic lung changes, sarcoid, ? superimposed infection many all be contributing.     Continue voriconazole - ? six week total course.    Continue vanco/ meropenem for now.    Can probably stop vanco tomorrow.    I may continue meropenem for a short course given recent fever is there is a question for pna.

## 2017-12-02 NOTE — PROGRESS NOTE ADULT - SUBJECTIVE AND OBJECTIVE BOX
Follow Up:      Inverval History/ROS:Patient is a 47y old  Male with sarcoid.    Intubated.    Afebrile today.    Repat blood cultures without growth      Allergies    No Known Allergies    Intolerances        ANTIMICROBIALS:  meropenem IVPB    meropenem IVPB 1000 every 8 hours  vancomycin  IVPB 1000 every 8 hours  voriconazole IVPB 300 every 12 hours      OTHER MEDS:  acetaminophen   Tablet 650 milliGRAM(s) Oral every 6 hours PRN  ALBUTerol    90 MICROgram(s) HFA Inhaler 2 Puff(s) Inhalation every 6 hours  buDESOnide 160 MICROgram(s)/formoterol 4.5 MICROgram(s) Inhaler 2 Puff(s) Inhalation two times a day  chlorhexidine 4% Liquid 1 Application(s) Topical daily  dexmedetomidine Infusion 0.3 MICROgram(s)/kG/Hr IV Continuous <Continuous>  enoxaparin Injectable 40 milliGRAM(s) SubCutaneous daily  fentaNYL   Patch 100 MICROgram(s)/Hr. 1 Patch Transdermal every 48 hours  ferrous    sulfate Liquid 300 milliGRAM(s) Enteral Tube daily  folic acid 1 milliGRAM(s) Oral daily  insulin lispro (HumaLOG) corrective regimen sliding scale   SubCutaneous every 6 hours  ipratropium 17 MICROgram(s) HFA Inhaler 1 Puff(s) Inhalation every 6 hours  norepinephrine Infusion 0.01 MICROgram(s)/kG/Min IV Continuous <Continuous>  polyethylene glycol 3350 17 Gram(s) Oral at bedtime  predniSONE   Tablet 20 milliGRAM(s) Oral daily  propofol Infusion 50 MICROgram(s)/kG/Min IV Continuous <Continuous>  senna Syrup 10 milliLiter(s) Oral at bedtime  valproic  acid Syrup 500 milliGRAM(s) Oral two times a day      Vital Signs Last 24 Hrs  T(C): 37.1 (02 Dec 2017 12:00), Max: 37.9 (02 Dec 2017 00:00)  T(F): 98.8 (02 Dec 2017 12:00), Max: 100.3 (02 Dec 2017 00:00)  HR: 82 (02 Dec 2017 14:00) (82 - 115)  BP: 101/64 (02 Dec 2017 14:00) (92/65 - 137/83)  BP(mean): 71 (02 Dec 2017 14:00) (68 - 93)  RR: 20 (02 Dec 2017 14:00) (12 - 26)  SpO2: 98% (02 Dec 2017 14:00) (96% - 100%)    PHYSICAL EXAM:  General: [ ] non-toxic  HEAD/EYES: [ ] PERRL [ x] white sclera [ ] icterus  ENT:  [ ] normal [x ] supple [ ] thrush [ ] pharyngeal exudate  Cardiovascular:   [ ] murmur [ x] normal [ ] PPM/AICD  Respiratory:  [x ] clear to ausculation bilaterally  GI:  [ x] soft, non-tender, normal bowel sounds  :  [ ] mock [ ] no CVA tenderness   Musculoskeletal:  [ ] no synovitis  Neurologic:  [ ] non-focal exam   Skin:  [ ] no rash  Lymph: [ ] no lymphadenopathy  Psychiatric:  [ ] appropriate affect [ ] alert & oriented  Lines:  [ x] no phlebitis [ ] central line                                7.5    12.69 )-----------( 156      ( 02 Dec 2017 02:28 )             23.5       12-02    144  |  97<L>  |  12  ----------------------------<  140<H>  4.0   |  40<H>  |  0.79    Ca    8.5      02 Dec 2017 02:38  Phos  3.4     12-02  Mg     2.1     12-02    TPro  5.5<L>  /  Alb  2.3<L>  /  TBili  0.2  /  DBili  x   /  AST  18  /  ALT  11  /  AlkPhos  68  12-02      Urinalysis Basic - ( 01 Dec 2017 05:10 )    Color: YELLOW / Appearance: HAZY / S.010 / pH: 7.0  Gluc: NEGATIVE / Ketone: NEGATIVE  / Bili: NEGATIVE / Urobili: NORMAL E.U.   Blood: NEGATIVE / Protein: 30 / Nitrite: NEGATIVE   Leuk Esterase: SMALL / RBC: 5-10 / WBC 10-25   Sq Epi: OCC / Non Sq Epi: x / Bacteria: x        MICROBIOLOGY:    RADIOLOGY:

## 2017-12-02 NOTE — PROGRESS NOTE ADULT - PROBLEM SELECTOR PLAN 7
s/p pleurodesis.  11/10 s/p pleurodesis. Management defer to CTS  11/11 no intervention for now. per CTS.  11/13: has mod pneumothorax stable: defer to thoracic for any intervention!  11/12 no intervention planned. bronchoscopy in future per CTS  11/14: Pt still has left sided pneumothorax: s/p blood patch as well as the talc pleurodesis on the left side: Hard to do any other intervention : would defer to thoracic surgical team!!  11/18 management per CTS. not plan for Chest tube at this moment.  11/19 stable radiographically and clinically. Management defer to CTS  11/22: Has this air space in the left lower lobe following DAVID lobectomy: ? PTX, empty space ? any intervention would be difficult: would defer to ct surgery!!  11/27/2017: for rpt ct scan chest now  1130: s/p chest tube placement on left side: yesterdays x-ray no full lung reexpansion:  12/01/2017: with only slight reexpansion of the ptx on left side after a small chest tube!!  12/2 radiographically improved s/p pleurodesis.  11/10 s/p pleurodesis. Management defer to CTS  11/11 no intervention for now. per CTS.  11/13: has mod pneumothorax stable: defer to thoracic for any intervention!  11/12 no intervention planned. bronchoscopy in future per CTS  11/14: Pt still has left sided pneumothorax: s/p blood patch as well as the talc pleurodesis on the left side: Hard to do any other intervention : would defer to thoracic surgical team!!  11/18 management per CTS. not plan for Chest tube at this moment.  11/19 stable radiographically and clinically. Management defer to CTS  11/22: Has this air space in the left lower lobe following DAVID lobectomy: ? PTX, empty space ? any intervention would be difficult: would defer to ct surgery!!  11/27/2017: for rpt ct scan chest now  1130: s/p chest tube placement on left side: yesterdays x-ray no full lung reexpansion:  12/01/2017: with only slight reexpansion of the ptx on left side after a small chest tube!!  12/2 radiographically improved. Chest Tube (+)

## 2017-12-02 NOTE — PROGRESS NOTE ADULT - PROBLEM SELECTOR PLAN 5
on prednisone.  11/10 continue steroid  11/11 on steroid  11/12 on oral steroid  11/13: cont oral steroids!!  11/14: Has pretty poor lungs secondary to steroids: Cont current therapy!!  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  11/27: cont on steroids  11/30: om 30 mg of prednisone  12/01:on 20 mg of prednisone now !!  12/02 Prednisone

## 2017-12-02 NOTE — PROGRESS NOTE ADULT - PROBLEM SELECTOR PLAN 6
controlled.  11/10 stable  11/11 stable  11/12 stable  11/18 off meds. not on pressor  11/19 stable without pressors  11/21: controlled  11/30: off an on pressors: currently off  12/2 on small dose of pressor. could be 2/2 propofol

## 2017-12-02 NOTE — PROGRESS NOTE ADULT - SUBJECTIVE AND OBJECTIVE BOX
Patient is a 47y old  Male who presents with a chief complaint of hemoptysis (2017 00:32)    Pertinent ROS:     MEDICATIONS  (STANDING):  ALBUTerol    90 MICROgram(s) HFA Inhaler 2 Puff(s) Inhalation every 6 hours  buDESOnide 160 MICROgram(s)/formoterol 4.5 MICROgram(s) Inhaler 2 Puff(s) Inhalation two times a day  chlorhexidine 4% Liquid 1 Application(s) Topical daily  dexmedetomidine Infusion 0.3 MICROgram(s)/kG/Hr (5.625 mL/Hr) IV Continuous <Continuous>  enoxaparin Injectable 40 milliGRAM(s) SubCutaneous daily  fentaNYL   Patch 100 MICROgram(s)/Hr. 1 Patch Transdermal every 48 hours  ferrous    sulfate Liquid 300 milliGRAM(s) Enteral Tube daily  folic acid 1 milliGRAM(s) Oral daily  insulin lispro (HumaLOG) corrective regimen sliding scale   SubCutaneous every 6 hours  ipratropium 17 MICROgram(s) HFA Inhaler 1 Puff(s) Inhalation every 6 hours  meropenem IVPB      meropenem IVPB 1000 milliGRAM(s) IV Intermittent every 8 hours  norepinephrine Infusion 0.01 MICROgram(s)/kG/Min (1.406 mL/Hr) IV Continuous <Continuous>  polyethylene glycol 3350 17 Gram(s) Oral at bedtime  predniSONE   Tablet 20 milliGRAM(s) Oral daily  propofol Infusion 50 MICROgram(s)/kG/Min (22.5 mL/Hr) IV Continuous <Continuous>  senna Syrup 10 milliLiter(s) Oral at bedtime  valproic  acid Syrup 500 milliGRAM(s) Oral two times a day  vancomycin  IVPB 1000 milliGRAM(s) IV Intermittent every 8 hours  voriconazole IVPB 300 milliGRAM(s) IV Intermittent every 12 hours    MEDICATIONS  (PRN):  acetaminophen   Tablet 650 milliGRAM(s) Oral every 6 hours PRN For Temp greater than 38 C (100.4 F)    Vital Signs Last 24 Hrs  T(C): 37.1 (02 Dec 2017 12:00), Max: 37.9 (02 Dec 2017 00:00)  T(F): 98.8 (02 Dec 2017 12:00), Max: 100.3 (02 Dec 2017 00:00)  HR: 82 (02 Dec 2017 15:11) (78 - 115)  BP: 96/60 (02 Dec 2017 15:00) (92/65 - 137/83)  BP(mean): 69 (02 Dec 2017 15:00) (68 - 93)  RR: 19 (02 Dec 2017 15:00) (12 - 26)  SpO2: 98% (02 Dec 2017 15:11) (96% - 100%)  Mode: CPAP with PS  FiO2: 40  PEEP: 8  PS: 15  MAP: 13    I&O's Summary    01 Dec 2017 07:01  -  02 Dec 2017 07:00  --------------------------------------------------------  IN: 2896.5 mL / OUT: 1360 mL / NET: 1536.5 mL    02 Dec 2017 07:01  -  02 Dec 2017 15:41  --------------------------------------------------------  IN: 263 mL / OUT: 255 mL / NET: 8 mL      Physical Exam:   Const:  Respiratory:   CVS:  GI:  CNS  SKIN  :     Labs:  ABG - ( 01 Dec 2017 04:00 )  pH: 7.45  /  pCO2: 62    /  pO2: 129   / HCO3: 40    / Base Excess: 16.7  /  SaO2: 99.2                            7.5    12.69 )-----------( 156      ( 02 Dec 2017 02:28 )             23.5     12-02    144  |  97<L>  |  12  ----------------------------<  140<H>  4.0   |  40<H>  |  0.79    Ca    8.5      02 Dec 2017 02:38  Phos  3.4     12-02  Mg     2.1     12-02    TPro  5.5<L>  /  Alb  2.3<L>  /  TBili  0.2  /  DBili  x   /  AST  18  /  ALT  11  /  AlkPhos  68  12-02    CAPILLARY BLOOD GLUCOSE      POCT Blood Glucose.: 154 mg/dL (02 Dec 2017 11:15)  POCT Blood Glucose.: 130 mg/dL (02 Dec 2017 06:45)  POCT Blood Glucose.: 122 mg/dL (01 Dec 2017 23:50)  POCT Blood Glucose.: 155 mg/dL (01 Dec 2017 17:16)    LIVER FUNCTIONS - ( 02 Dec 2017 02:38 )  Alb: 2.3 g/dL / Pro: 5.5 g/dL / ALK PHOS: 68 u/L / ALT: 11 u/L / AST: 18 u/L / GGT: x             Urinalysis Basic - ( 01 Dec 2017 05:10 )    Color: YELLOW / Appearance: HAZY / S.010 / pH: 7.0  Gluc: NEGATIVE / Ketone: NEGATIVE  / Bili: NEGATIVE / Urobili: NORMAL E.U.   Blood: NEGATIVE / Protein: 30 / Nitrite: NEGATIVE   Leuk Esterase: SMALL / RBC: 5-10 / WBC 10-25   Sq Epi: OCC / Non Sq Epi: x / Bacteria: x      D DImer  Cultures:       Blood Culture:            @ 15:47  Organism Klebsiella pneumoniae  Gram stain --      Urine Culture:           @ 15:47  Organism Klebsiella pneumoniae      Wound culture:                 @ 06:18  Organism --  Culture w/ gram stain --  Specimen Source BLOOD PERIPHERAL    Wound culture:                 @ 15:21  Organism --  Culture w/ gram stain --  Specimen Source BLOOD VENOUS    Wound culture:                 @ 04:37  Organism --  Culture w/ gram stain --  Specimen Source BLOOD    Wound culture:                 @ 22:40  Organism --  Culture w/ gram stain --  Specimen Source BLOOD    Wound culture:                 @ 15:47  Organism Klebsiella pneumoniae  Culture w/ gram stain --  Specimen Source URINE CATHETER      Abscess culture:              @ 06:18  Organism --  Gram Stain --  Specimen Source BLOOD PERIPHERAL    Abscess culture:              @ 15:21  Organism --  Gram Stain --  Specimen Source BLOOD VENOUS    Abscess culture:              @ 04:37  Organism --  Gram Stain --  Specimen Source BLOOD    Abscess culture:              @ 22:40  Organism --  Gram Stain --  Specimen Source BLOOD    Abscess culture:              @ 15:47  Organism Klebsiella pneumoniae  Gram Stain --  Specimen Source URINE CATHETER      CSF:               @ 15:47  Organism Klebsiella pneumoniae  Gram Stain --      Tissue culture:            @ 06:18  Organism --  Gram Stain --  Specimen Source BLOOD PERIPHERAL    Tissue culture:            @ 15:21  Organism --  Gram Stain --  Specimen Source BLOOD VENOUS    Tissue culture:            @ 04:37  Organism --  Gram Stain --  Specimen Source BLOOD    Tissue culture:            @ 22:40  Organism --  Gram Stain --  Specimen Source BLOOD    Tissue culture:            @ 15:47  Organism Klebsiella pneumoniae  Gram Stain --  Specimen Source URINE CATHETER      Body Fluid Smear & Culture:                         @ 06:18  AFB Smear  --  Culture Acid Fast Body Fluid w/ Smear  --  Culture Acid Fast Smear Concentrated   --    Culture Results:     --  Specimen Source BLOOD PERIPHERAL    Body Fluid Smear & Culture:                         @ 15:21  AFB Smear  --  Culture Acid Fast Body Fluid w/ Smear  --  Culture Acid Fast Smear Concentrated   --    Culture Results:     --  Specimen Source BLOOD VENOUS    Body Fluid Smear & Culture:                         @ 04:37  AFB Smear  --  Culture Acid Fast Body Fluid w/ Smear  --  Culture Acid Fast Smear Concentrated   --    Culture Results:     --  Specimen Source BLOOD    Body Fluid Smear & Culture:                         @ 22:40  AFB Smear  --  Culture Acid Fast Body Fluid w/ Smear  --  Culture Acid Fast Smear Concentrated   --    Culture Results:     --  Specimen Source BLOOD    Body Fluid Smear & Culture:                         @ 15:47  AFB Smear  --  Culture Acid Fast Body Fluid w/ Smear  --  Culture Acid Fast Smear Concentrated   --    Culture Results:     --  Specimen Source URINE CATHETER      Studies  Chest X-RAY < from: Xray Chest 1 View AP- PORTABLE-Urgent (17 @ 20:34) >  EXAM:  RAD CHEST PORTABLE URGENT        PROCEDURE DATE:  2017         INTERPRETATION:  Submitted for review on 2017.      INDICATION: Status post bronchoscopy.      COMPARISON: Radiograph 2017.      FINDINGS:      Lines and Tubes:The tip of the enteric tube is below the diaphragm. Left   pigtail catheter in place.      Lungs: The bilateral perihilar opacities are unchanged.      Pleura: The air collection along the left upper hemithorax is unchanged.      Heart and Mediastinum: The heart is normal in size.           Skeletal: Unremarkable.        IMPRESSION:    1.  No change in the air collection within the left hemithorax.    < end of copied text >    CT SCAN Chest < from: CT Chest w/ IV Cont (17 @ 08:08) >    EXAM:  CT CHEST IC        PROCEDURE DATE:  Dec  1 2017         INTERPRETATION:  CT CHEST WITHOUT CONTRAST    INDICATION: Bleeding from trach site and hemoptysis.    TECHNIQUE: Unenhanced helical images were obtained of the chest. Coronal   and sagittal images were reconstructed. Maximum intensity projection   images were generated. The study was obtained in conjunction with a   contrast-enhanced neck CT.    COMPARISON: CT chest dated 2017.    FINDINGS:     Tubes/Lines: Status post tracheostomy. An enteric tube courses through   the esophagus with its distal tip within the stomach.    Lungs And Airways: Secretions are noted within the left lower lobe   bronchi. Status post left upper lobe lobectomy. The herniation of lung.   The left fifth anterior rib interspace has resolved. The air within the   left hemithorax remains. The paramediastinal consolidation and cystic   changes in the lungs remain unchanged. Bilateral lower lobe nodules are   unchanged.     New patchy consolidations and ground was opacities are noted within the   bilateral lower lobes.    Pleura:There is a small right pleural effusion and trace left pleural   effusions are unchanged.  There is been interval placement of a left   anterior pigtail catheter within the left upper lobe lobectomy bed. The   airways are extrinsic to the left lower lobe has slightly decreased   following placement of pigtail catheter.     Mediastinum: There are no enlarged chest lymph nodes. The visualized   portion of the thyroid gland is unremarkable.       Heart and Vasculature: The heart is normal in size.  There is no   pericardial effusion.   No coronary artery disease.    The main pulmonary artery is normal in caliber.  Left-sided aortic arch   and left-sided descending thoracic aorta. No aneurysm. Atheromatous   disease of the aorta.    Upper Abdomen: Punctate nonobstructing bilateral renal calculi.    Bones And Soft Tissues: Left seventh rib thoracotomy rib defect.  The   soft tissues are unremarkable.    IMPRESSION:    1.  Bilateral lobe tree-in-bud opacities with new patchy bilateral lobe   consolidations likely represent pneumonia.  2.  Small right and trace left pleural effusions.  3.  Interval placement of a left anterior approach pigtail catheter with   interval slight decrease in size of air within the left upper lobe   lobectomy bed.    < end of copied text >    CT Abdomen < from: CT Neck Soft Tissue w/ IV Cont (17 @ 08:09) >    EXAM:  CT NECK SOFT TISSUE IC        PROCEDURE DATE:  Dec  1 2017         INTERPRETATION:  HISTORY: Bleeding from tracheostomy. Extensive lung   pathology. History of sarcoidosis..    Description: A contrast-enhanced neck CT was performed.    COMPARISON: CTA of the neck from 09/10/2017.    115 cc intravenous Omnipaque 350 contrast was administered, 10 cc   contrast was discarded.    Axial thin section images with coronal and sagittal reformatted series   were performed.    There has been interval placement of a tracheostomy compared to the prior   neck CT study. A small irregular vessel is noted adjacent to the left   aspect of the tracheostomy tube at the insertion point in the trachea; a   small pseudoaneurysm can't be excluded from a small branch of a thyroidal   artery.    A nasogastric tube is in place.    A chest tube is partially visualized at the left lung apex. Extensive   reticular opacities involve the lungs, along with cystic changes,   partially visualized; please see separate chest CT report.    Evaluation of the aerodigestive tract is markedly limited by secretions,   the presence of a nasogastric tube, and tracheostomy tube.    There is no gross evidence for neck abscess or hematoma.    Extensive streak artifact limits evaluation of the left greater than   right lobes of the thyroid gland, without large lesion appreciated.    Moderately extensive opacification of the sphenoid sinus is present.   Moderate mucosal thickening involves left maxillary sinus. Mild mucosal   thickening involves right maxillary sinus. Mild to moderate posterior   ethmoid sinus opacification is present. Correlate for possible sinusitis.    The mastoid air cells are partially opacified bilaterally.    No destructive bony lesions are present.    Multiple nonspecific lymph nodes are present in the submental,   submandibular, jugular chain, and spinal accessory chain regions. No   enlarged or necrotic lymph nodes are visualized.    I discussed the exam findings with Dr. Savage at 9:20 Kristine 2017 with   readback.    IMPRESSION:    A small irregular vessel is noted adjacent to the left aspect of the   tracheostomy tube at the insertion point in the trachea; a small   pseudoaneurysm can't be excluded from a small branch of a thyroidal   artery.    < end of copied text >    Venous Dopplers: LE:   Others  < from: Transthoracic Echocardiogram (17 @ 13:25) >    Patient name: Miguel Dickens  YOB: 1970   Age: 47 (M)   MR#: [id number]  Study Date: 2017  [facility] Yasmeen Yost  Study quality: Technically Difficult  Referring Physician: Rodrigue Caldera MD  Blood Pressure: 87/69 mmHg  Height: 180 cm  Weight: 97 kg  BSA: 2.2 m2  ------------------------------------------------------------------------  PROCEDURE: Transthoracic echocardiogram with 2-D, M-Mode  and complete spectral and color flow Doppler.  INDICATION: Chronic diastolic (congestive) heart failure  (I50.32)  ------------------------------------------------------------------------  DIMENSIONS:  Dimensions:     Normal Values:  LA:     3.6 cm    2.0 - 4.0 cm  Ao:     2.7 cm    2.0 - 3.8 cm  SEPTUM:   ---    0.6 - 1.2 cm  PWT:      ---     0.6 - 1.1 cm  LVIDd:    ---     3.0 - 5.6 cm  LVIDs:    ---     1.8 - 4.0 cm  ------------------------------------------------------------------------  OBSERVATIONS:  Mitral Valve: Normal mitral valve.  Aortic Root: Normal aortic root.  Aortic Valve: Aortic valve not well visualized; probably  normal.  Left Atrium: Normal left atrium.  Left Ventricle: Technically very difficult/limited left  ventricle views. Grossly  at least moderate global left  ventricular systolic dysfunction.  Right Heart: Normal right atrium. The right ventricle is  not well visualized appears grossly enlarged with decreased  function.  Normal tricuspid valve. Mild tricuspid  regurgitation. Normal pulmonic valve.  Pericardium/PleuraNormal pericardium with trace pericardial  effusion.  Hemodynamic: Estimated right ventricular systolic pressure  equals 50 mm Hg, assuming right atrial pressure equals 10  mm Hg, consistent with moderate pulmonary hypertension.  ------------------------------------------------------------------------  CONCLUSIONS:  1. Technically very difficult/limited left ventricle views.  Grossly  at least moderate global left ventricular systolic  dysfunction.  2. The right ventricle is not well visualized appears  grossly enlarged with decreased function.  3. Estimated pulmonary artery systolic pressure equals 50  mm Hg, assuming right atrial pressure equals 10  mm Hg,  consistent with moderate pulmonary hypertension.    < end of copied text > Patient is a 47y old  Male who presents with a chief complaint of hemoptysis (2017 00:32)    Pertinent ROS: sedated with propofol but awake. alert., follows commands. Back on ventilator support. Chest tube (+), pressor (+)     MEDICATIONS  (STANDING):  ALBUTerol    90 MICROgram(s) HFA Inhaler 2 Puff(s) Inhalation every 6 hours  buDESOnide 160 MICROgram(s)/formoterol 4.5 MICROgram(s) Inhaler 2 Puff(s) Inhalation two times a day  chlorhexidine 4% Liquid 1 Application(s) Topical daily  dexmedetomidine Infusion 0.3 MICROgram(s)/kG/Hr (5.625 mL/Hr) IV Continuous <Continuous>  enoxaparin Injectable 40 milliGRAM(s) SubCutaneous daily  fentaNYL   Patch 100 MICROgram(s)/Hr. 1 Patch Transdermal every 48 hours  ferrous    sulfate Liquid 300 milliGRAM(s) Enteral Tube daily  folic acid 1 milliGRAM(s) Oral daily  insulin lispro (HumaLOG) corrective regimen sliding scale   SubCutaneous every 6 hours  ipratropium 17 MICROgram(s) HFA Inhaler 1 Puff(s) Inhalation every 6 hours  meropenem IVPB      meropenem IVPB 1000 milliGRAM(s) IV Intermittent every 8 hours  norepinephrine Infusion 0.01 MICROgram(s)/kG/Min (1.406 mL/Hr) IV Continuous <Continuous>  polyethylene glycol 3350 17 Gram(s) Oral at bedtime  predniSONE   Tablet 20 milliGRAM(s) Oral daily  propofol Infusion 50 MICROgram(s)/kG/Min (22.5 mL/Hr) IV Continuous <Continuous>  senna Syrup 10 milliLiter(s) Oral at bedtime  valproic  acid Syrup 500 milliGRAM(s) Oral two times a day  vancomycin  IVPB 1000 milliGRAM(s) IV Intermittent every 8 hours  voriconazole IVPB 300 milliGRAM(s) IV Intermittent every 12 hours    MEDICATIONS  (PRN):  acetaminophen   Tablet 650 milliGRAM(s) Oral every 6 hours PRN For Temp greater than 38 C (100.4 F)    Vital Signs Last 24 Hrs  T(C): 37.1 (02 Dec 2017 12:00), Max: 37.9 (02 Dec 2017 00:00)  T(F): 98.8 (02 Dec 2017 12:00), Max: 100.3 (02 Dec 2017 00:00)  HR: 82 (02 Dec 2017 15:11) (78 - 115)  BP: 96/60 (02 Dec 2017 15:00) (92/65 - 137/83)  BP(mean): 69 (02 Dec 2017 15:00) (68 - 93)  RR: 19 (02 Dec 2017 15:00) (12 - 26)  SpO2: 98% (02 Dec 2017 15:11) (96% - 100%)  Mode: CPAP with PS  FiO2: 40  PEEP: 8  PS: 15  MAP: 13    I&O's Summary    01 Dec 2017 07:01  -  02 Dec 2017 07:00  --------------------------------------------------------  IN: 2896.5 mL / OUT: 1360 mL / NET: 1536.5 mL    02 Dec 2017 07:01  -  02 Dec 2017 15:41  --------------------------------------------------------  IN: 263 mL / OUT: 255 mL / NET: 8 mL      Physical Exam:   Const: NAD  Respiratory: Coarse breath sounds, Trach (+) to ventilator  CVS: S1, S2 no murmur   GI: abd soft non-tender, BS, slightly distended   CNS awake, alert, sedated, follows simple commands   SKIN: Dry normal color, normal turgor      Labs:  ABG - ( 01 Dec 2017 04:00 )  pH: 7.45  /  pCO2: 62    /  pO2: 129   / HCO3: 40    / Base Excess: 16.7  /  SaO2: 99.2                            7.5    12.69 )-----------( 156      ( 02 Dec 2017 02:28 )             23.5     12-02    144  |  97<L>  |  12  ----------------------------<  140<H>  4.0   |  40<H>  |  0.79    Ca    8.5      02 Dec 2017 02:38  Phos  3.4     12-02  Mg     2.1     1202    TPro  5.5<L>  /  Alb  2.3<L>  /  TBili  0.2  /  DBili  x   /  AST  18  /  ALT  11  /  AlkPhos  68      CAPILLARY BLOOD GLUCOSE      POCT Blood Glucose.: 154 mg/dL (02 Dec 2017 11:15)  POCT Blood Glucose.: 130 mg/dL (02 Dec 2017 06:45)  POCT Blood Glucose.: 122 mg/dL (01 Dec 2017 23:50)  POCT Blood Glucose.: 155 mg/dL (01 Dec 2017 17:16)    LIVER FUNCTIONS - ( 02 Dec 2017 02:38 )  Alb: 2.3 g/dL / Pro: 5.5 g/dL / ALK PHOS: 68 u/L / ALT: 11 u/L / AST: 18 u/L / GGT: x             Urinalysis Basic - ( 01 Dec 2017 05:10 )    Color: YELLOW / Appearance: HAZY / S.010 / pH: 7.0  Gluc: NEGATIVE / Ketone: NEGATIVE  / Bili: NEGATIVE / Urobili: NORMAL E.U.   Blood: NEGATIVE / Protein: 30 / Nitrite: NEGATIVE   Leuk Esterase: SMALL / RBC: 5-10 / WBC 10-25   Sq Epi: OCC / Non Sq Epi: x / Bacteria: x      D DImer  Cultures:       Blood Culture:            @ 15:47  Organism Klebsiella pneumoniae  Gram stain --      Urine Culture:           @ 15:47  Organism Klebsiella pneumoniae      Wound culture:                 @ 06:18  Organism --  Culture w/ gram stain --  Specimen Source BLOOD PERIPHERAL    Wound culture:                 @ 15:21  Organism --  Culture w/ gram stain --  Specimen Source BLOOD VENOUS    Wound culture:                 @ 04:37  Organism --  Culture w/ gram stain --  Specimen Source BLOOD    Wound culture:                 @ 22:40  Organism --  Culture w/ gram stain --  Specimen Source BLOOD    Wound culture:                 @ 15:47  Organism Klebsiella pneumoniae  Culture w/ gram stain --  Specimen Source URINE CATHETER      Abscess culture:              @ 06:18  Organism --  Gram Stain --  Specimen Source BLOOD PERIPHERAL    Abscess culture:              @ 15:21  Organism --  Gram Stain --  Specimen Source BLOOD VENOUS    Abscess culture:              @ 04:37  Organism --  Gram Stain --  Specimen Source BLOOD    Abscess culture:              @ 22:40  Organism --  Gram Stain --  Specimen Source BLOOD    Abscess culture:              @ 15:47  Organism Klebsiella pneumoniae  Gram Stain --  Specimen Source URINE CATHETER      CSF:               @ 15:47  Organism Klebsiella pneumoniae  Gram Stain --      Tissue culture:            @ 06:18  Organism --  Gram Stain --  Specimen Source BLOOD PERIPHERAL    Tissue culture:            @ 15:21  Organism --  Gram Stain --  Specimen Source BLOOD VENOUS    Tissue culture:            @ 04:37  Organism --  Gram Stain --  Specimen Source BLOOD    Tissue culture:            @ 22:40  Organism --  Gram Stain --  Specimen Source BLOOD    Tissue culture:            @ 15:47  Organism Klebsiella pneumoniae  Gram Stain --  Specimen Source URINE CATHETER      Body Fluid Smear & Culture:                         @ 06:18  AFB Smear  --  Culture Acid Fast Body Fluid w/ Smear  --  Culture Acid Fast Smear Concentrated   --    Culture Results:     --  Specimen Source BLOOD PERIPHERAL    Body Fluid Smear & Culture:                         @ 15:21  AFB Smear  --  Culture Acid Fast Body Fluid w/ Smear  --  Culture Acid Fast Smear Concentrated   --    Culture Results:     --  Specimen Source BLOOD VENOUS    Body Fluid Smear & Culture:                         @ 04:37  AFB Smear  --  Culture Acid Fast Body Fluid w/ Smear  --  Culture Acid Fast Smear Concentrated   --    Culture Results:     --  Specimen Source BLOOD    Body Fluid Smear & Culture:                         @ 22:40  AFB Smear  --  Culture Acid Fast Body Fluid w/ Smear  --  Culture Acid Fast Smear Concentrated   --    Culture Results:     --  Specimen Source BLOOD    Body Fluid Smear & Culture:                         @ 15:47  AFB Smear  --  Culture Acid Fast Body Fluid w/ Smear  --  Culture Acid Fast Smear Concentrated   --    Culture Results:     --  Specimen Source URINE CATHETER      Studies  Chest X-RAY < from: Xray Chest 1 View AP- PORTABLE-Urgent (.17 @ 20:34) >  EXAM:  RAD CHEST PORTABLE URGENT        PROCEDURE DATE:  2017         INTERPRETATION:  Submitted for review on 2017.      INDICATION: Status post bronchoscopy.      COMPARISON: Radiograph 2017.      FINDINGS:      Lines and Tubes:The tip of the enteric tube is below the diaphragm. Left   pigtail catheter in place.      Lungs: The bilateral perihilar opacities are unchanged.      Pleura: The air collection along the left upper hemithorax is unchanged.      Heart and Mediastinum: The heart is normal in size.           Skeletal: Unremarkable.        IMPRESSION:    1.  No change in the air collection within the left hemithorax.    < end of copied text >    CT SCAN Chest < from: CT Chest w/ IV Cont (17 @ 08:08) >    EXAM:  CT CHEST IC        PROCEDURE DATE:  Dec  1 2017         INTERPRETATION:  CT CHEST WITHOUT CONTRAST    INDICATION: Bleeding from trach site and hemoptysis.    TECHNIQUE: Unenhanced helical images were obtained of the chest. Coronal   and sagittal images were reconstructed. Maximum intensity projection   images were generated. The study was obtained in conjunction with a   contrast-enhanced neck CT.    COMPARISON: CT chest dated 2017.    FINDINGS:     Tubes/Lines: Status post tracheostomy. An enteric tube courses through   the esophagus with its distal tip within the stomach.    Lungs And Airways: Secretions are noted within the left lower lobe   bronchi. Status post left upper lobe lobectomy. The herniation of lung.   The left fifth anterior rib interspace has resolved. The air within the   left hemithorax remains. The paramediastinal consolidation and cystic   changes in the lungs remain unchanged. Bilateral lower lobe nodules are   unchanged.     New patchy consolidations and ground was opacities are noted within the   bilateral lower lobes.    Pleura:There is a small right pleural effusion and trace left pleural   effusions are unchanged.  There is been interval placement of a left   anterior pigtail catheter within the left upper lobe lobectomy bed. The   airways are extrinsic to the left lower lobe has slightly decreased   following placement of pigtail catheter.     Mediastinum: There are no enlarged chest lymph nodes. The visualized   portion of the thyroid gland is unremarkable.       Heart and Vasculature: The heart is normal in size.  There is no   pericardial effusion.   No coronary artery disease.    The main pulmonary artery is normal in caliber.  Left-sided aortic arch   and left-sided descending thoracic aorta. No aneurysm. Atheromatous   disease of the aorta.    Upper Abdomen: Punctate nonobstructing bilateral renal calculi.    Bones And Soft Tissues: Left seventh rib thoracotomy rib defect.  The   soft tissues are unremarkable.    IMPRESSION:    1.  Bilateral lobe tree-in-bud opacities with new patchy bilateral lobe   consolidations likely represent pneumonia.  2.  Small right and trace left pleural effusions.  3.  Interval placement of a left anterior approach pigtail catheter with   interval slight decrease in size of air within the left upper lobe   lobectomy bed.    < end of copied text >    CT Abdomen < from: CT Neck Soft Tissue w/ IV Cont (17 @ 08:09) >    EXAM:  CT NECK SOFT TISSUE IC        PROCEDURE DATE:  Dec  1 2017         INTERPRETATION:  HISTORY: Bleeding from tracheostomy. Extensive lung   pathology. History of sarcoidosis..    Description: A contrast-enhanced neck CT was performed.    COMPARISON: CTA of the neck from 09/10/2017.    115 cc intravenous Omnipaque 350 contrast was administered, 10 cc   contrast was discarded.    Axial thin section images with coronal and sagittal reformatted series   were performed.    There has been interval placement of a tracheostomy compared to the prior   neck CT study. A small irregular vessel is noted adjacent to the left   aspect of the tracheostomy tube at the insertion point in the trachea; a   small pseudoaneurysm can't be excluded from a small branch of a thyroidal   artery.    A nasogastric tube is in place.    A chest tube is partially visualized at the left lung apex. Extensive   reticular opacities involve the lungs, along with cystic changes,   partially visualized; please see separate chest CT report.    Evaluation of the aerodigestive tract is markedly limited by secretions,   the presence of a nasogastric tube, and tracheostomy tube.    There is no gross evidence for neck abscess or hematoma.    Extensive streak artifact limits evaluation of the left greater than   right lobes of the thyroid gland, without large lesion appreciated.    Moderately extensive opacification of the sphenoid sinus is present.   Moderate mucosal thickening involves left maxillary sinus. Mild mucosal   thickening involves right maxillary sinus. Mild to moderate posterior   ethmoid sinus opacification is present. Correlate for possible sinusitis.    The mastoid air cells are partially opacified bilaterally.    No destructive bony lesions are present.    Multiple nonspecific lymph nodes are present in the submental,   submandibular, jugular chain, and spinal accessory chain regions. No   enlarged or necrotic lymph nodes are visualized.    I discussed the exam findings with Dr. Savage at 9:20 Kristine 2017 with   readback.    IMPRESSION:    A small irregular vessel is noted adjacent to the left aspect of the   tracheostomy tube at the insertion point in the trachea; a small   pseudoaneurysm can't be excluded from a small branch of a thyroidal   artery.    < end of copied text >    Venous Dopplers: LE:   Others  < from: Transthoracic Echocardiogram (17 @ 13:25) >    Patient name: Miguel Dickens  YOB: 1970   Age: 47 (M)   MR#: [id number]  Study Date: 2017  [facility] Yasmeen Yost  Study quality: Technically Difficult  Referring Physician: Rodrigue Caldear MD  Blood Pressure: 87/69 mmHg  Height: 180 cm  Weight: 97 kg  BSA: 2.2 m2  ------------------------------------------------------------------------  PROCEDURE: Transthoracic echocardiogram with 2-D, M-Mode  and complete spectral and color flow Doppler.  INDICATION: Chronic diastolic (congestive) heart failure  (I50.32)  ------------------------------------------------------------------------  DIMENSIONS:  Dimensions:     Normal Values:  LA:     3.6 cm    2.0 - 4.0 cm  Ao:     2.7 cm    2.0 - 3.8 cm  SEPTUM:   ---    0.6 - 1.2 cm  PWT:      ---     0.6 - 1.1 cm  LVIDd:    ---     3.0 - 5.6 cm  LVIDs:    ---     1.8 - 4.0 cm  ------------------------------------------------------------------------  OBSERVATIONS:  Mitral Valve: Normal mitral valve.  Aortic Root: Normal aortic root.  Aortic Valve: Aortic valve not well visualized; probably  normal.  Left Atrium: Normal left atrium.  Left Ventricle: Technically very difficult/limited left  ventricle views. Grossly  at least moderate global left  ventricular systolic dysfunction.  Right Heart: Normal right atrium. The right ventricle is  not well visualized appears grossly enlarged with decreased  function.  Normal tricuspid valve. Mild tricuspid  regurgitation. Normal pulmonic valve.  Pericardium/PleuraNormal pericardium with trace pericardial  effusion.  Hemodynamic: Estimated right ventricular systolic pressure  equals 50 mm Hg, assuming right atrial pressure equals 10  mm Hg, consistent with moderate pulmonary hypertension.  ------------------------------------------------------------------------  CONCLUSIONS:  1. Technically very difficult/limited left ventricle views.  Grossly  at least moderate global left ventricular systolic  dysfunction.  2. The right ventricle is not well visualized appears  grossly enlarged with decreased function.  3. Estimated pulmonary artery systolic pressure equals 50  mm Hg, assuming right atrial pressure equals 10  mm Hg,  consistent with moderate pulmonary hypertension.    < end of copied text > Patient is a 47y old  Male who presents with a chief complaint of hemoptysis (09 Nov 2017 00:32)    Pertinent ROS: sedated with propofol but awake. alert., follows commands. Back on ventilator support. Chest tube (+), pressor (+)     MEDICATIONS  (STANDING):  ALBUTerol    90 MICROgram(s) HFA Inhaler 2 Puff(s) Inhalation every 6 hours  buDESOnide 160 MICROgram(s)/formoterol 4.5 MICROgram(s) Inhaler 2 Puff(s) Inhalation two times a day  chlorhexidine 4% Liquid 1 Application(s) Topical daily  dexmedetomidine Infusion 0.3 MICROgram(s)/kG/Hr (5.625 mL/Hr) IV Continuous <Continuous>  enoxaparin Injectable 40 milliGRAM(s) SubCutaneous daily  fentaNYL   Patch 100 MICROgram(s)/Hr. 1 Patch Transdermal every 48 hours  ferrous    sulfate Liquid 300 milliGRAM(s) Enteral Tube daily  folic acid 1 milliGRAM(s) Oral daily  insulin lispro (HumaLOG) corrective regimen sliding scale   SubCutaneous every 6 hours  ipratropium 17 MICROgram(s) HFA Inhaler 1 Puff(s) Inhalation every 6 hours  meropenem IVPB      meropenem IVPB 1000 milliGRAM(s) IV Intermittent every 8 hours  norepinephrine Infusion 0.01 MICROgram(s)/kG/Min (1.406 mL/Hr) IV Continuous <Continuous>  polyethylene glycol 3350 17 Gram(s) Oral at bedtime  predniSONE   Tablet 20 milliGRAM(s) Oral daily  propofol Infusion 50 MICROgram(s)/kG/Min (22.5 mL/Hr) IV Continuous <Continuous>  senna Syrup 10 milliLiter(s) Oral at bedtime  valproic  acid Syrup 500 milliGRAM(s) Oral two times a day  vancomycin  IVPB 1000 milliGRAM(s) IV Intermittent every 8 hours  voriconazole IVPB 300 milliGRAM(s) IV Intermittent every 12 hours    MEDICATIONS  (PRN):  acetaminophen   Tablet 650 milliGRAM(s) Oral every 6 hours PRN For Temp greater than 38 C (100.4 F)    Vital Signs Last 24 Hrs  T(C): 37.1 (02 Dec 2017 12:00), Max: 37.9 (02 Dec 2017 00:00)  T(F): 98.8 (02 Dec 2017 12:00), Max: 100.3 (02 Dec 2017 00:00)  HR: 82 (02 Dec 2017 15:11) (78 - 115)  BP: 96/60 (02 Dec 2017 15:00) (92/65 - 137/83)  BP(mean): 69 (02 Dec 2017 15:00) (68 - 93)  RR: 19 (02 Dec 2017 15:00) (12 - 26)  SpO2: 98% (02 Dec 2017 15:11) (96% - 100%)  Mode: CPAP with PS  FiO2: 40  PEEP: 8  PS: 15  MAP: 13    I&O's Summary    01 Dec 2017 07:01  -  02 Dec 2017 07:00  --------------------------------------------------------  IN: 2896.5 mL / OUT: 1360 mL / NET: 1536.5 mL    02 Dec 2017 07:01  -  02 Dec 2017 15:41  --------------------------------------------------------  IN: 263 mL / OUT: 255 mL / NET: 8 mL      Physical Exam:   Const: NAD  Respiratory: Coarse breath sounds, Trach (+) to ventilator  CVS: S1, S2 no murmur   GI: abd soft non-tender, BS, slightly distended   CNS awake, alert, sedated, follows simple commands   SKIN: Dry normal color, normal turgor      Labs:  ABG - ( 01 Dec 2017 04:00 )  pH: 7.45  /  pCO2: 62    /  pO2: 129   / HCO3: 40    / Base Excess: 16.7  /  SaO2: 99.2                            7.5    12.69 )-----------( 156      ( 02 Dec 2017 02:28 )             23.5     12-02    144  |  97<L>  |  12  ----------------------------<  140<H>  4.0   |  40<H>  |  0.79    Ca    8.5      02 Dec 2017 02:38  Phos  3.4     12-02  Mg     2.1     12-02    TPro  5.5<L>  /  Alb  2.3<L>  /  TBili  0.2  /  DBili  x   /  AST  18  /  ALT  11  /  AlkPhos  68  12-02          Studies  Chest X-RAY < from: Xray Chest 1 View AP- PORTABLE-Urgent (11.30.17 @ 20:34) >  EXAM:  RAD CHEST PORTABLE URGENT        PROCEDURE DATE:  Nov 30 2017         INTERPRETATION:  Submitted for review on 12/1/2017.      INDICATION: Status post bronchoscopy.      COMPARISON: Radiograph 11/29/2017.      FINDINGS:      Lines and Tubes:The tip of the enteric tube is below the diaphragm. Left   pigtail catheter in place.      Lungs: The bilateral perihilar opacities are unchanged.      Pleura: The air collection along the left upper hemithorax is unchanged.      Heart and Mediastinum: The heart is normal in size.           Skeletal: Unremarkable.        IMPRESSION:    1.  No change in the air collection within the left hemithorax.    < end of copied text >    CT SCAN Chest < from: CT Chest w/ IV Cont (12.01.17 @ 08:08) >    EXAM:  CT CHEST IC        PROCEDURE DATE:  Dec  1 2017         INTERPRETATION:  CT CHEST WITHOUT CONTRAST    INDICATION: Bleeding from trach site and hemoptysis.    TECHNIQUE: Unenhanced helical images were obtained of the chest. Coronal   and sagittal images were reconstructed. Maximum intensity projection   images were generated. The study was obtained in conjunction with a   contrast-enhanced neck CT.    COMPARISON: CT chest dated 11/25/2017.    FINDINGS:     Tubes/Lines: Status post tracheostomy. An enteric tube courses through   the esophagus with its distal tip within the stomach.    Lungs And Airways: Secretions are noted within the left lower lobe   bronchi. Status post left upper lobe lobectomy. The herniation of lung.   The left fifth anterior rib interspace has resolved. The air within the   left hemithorax remains. The paramediastinal consolidation and cystic   changes in the lungs remain unchanged. Bilateral lower lobe nodules are   unchanged.     New patchy consolidations and ground was opacities are noted within the   bilateral lower lobes.    Pleura:There is a small right pleural effusion and trace left pleural   effusions are unchanged.  There is been interval placement of a left   anterior pigtail catheter within the left upper lobe lobectomy bed. The   airways are extrinsic to the left lower lobe has slightly decreased   following placement of pigtail catheter.     Mediastinum: There are no enlarged chest lymph nodes. The visualized   portion of the thyroid gland is unremarkable.       Heart and Vasculature: The heart is normal in size.  There is no   pericardial effusion.   No coronary artery disease.    The main pulmonary artery is normal in caliber.  Left-sided aortic arch   and left-sided descending thoracic aorta. No aneurysm. Atheromatous   disease of the aorta.    Upper Abdomen: Punctate nonobstructing bilateral renal calculi.    Bones And Soft Tissues: Left seventh rib thoracotomy rib defect.  The   soft tissues are unremarkable.    IMPRESSION:    1.  Bilateral lobe tree-in-bud opacities with new patchy bilateral lobe   consolidations likely represent pneumonia.  2.  Small right and trace left pleural effusions.  3.  Interval placement of a left anterior approach pigtail catheter with   interval slight decrease in size of air within the left upper lobe   lobectomy bed.    < end of copied text >    CT Abdomen < from: CT Neck Soft Tissue w/ IV Cont (12.01.17 @ 08:09) >    EXAM:  CT NECK SOFT TISSUE IC        PROCEDURE DATE:  Dec  1 2017         INTERPRETATION:  HISTORY: Bleeding from tracheostomy. Extensive lung   pathology. History of sarcoidosis..    Description: A contrast-enhanced neck CT was performed.    COMPARISON: CTA of the neck from 09/10/2017.    115 cc intravenous Omnipaque 350 contrast was administered, 10 cc   contrast was discarded.    Axial thin section images with coronal and sagittal reformatted series   were performed.    There has been interval placement of a tracheostomy compared to the prior   neck CT study. A small irregular vessel is noted adjacent to the left   aspect of the tracheostomy tube at the insertion point in the trachea; a   small pseudoaneurysm can't be excluded from a small branch of a thyroidal   artery.    A nasogastric tube is in place.    A chest tube is partially visualized at the left lung apex. Extensive   reticular opacities involve the lungs, along with cystic changes,   partially visualized; please see separate chest CT report.    Evaluation of the aerodigestive tract is markedly limited by secretions,   the presence of a nasogastric tube, and tracheostomy tube.    There is no gross evidence for neck abscess or hematoma.    Extensive streak artifact limits evaluation of the left greater than   right lobes of the thyroid gland, without large lesion appreciated.    Moderately extensive opacification of the sphenoid sinus is present.   Moderate mucosal thickening involves left maxillary sinus. Mild mucosal   thickening involves right maxillary sinus. Mild to moderate posterior   ethmoid sinus opacification is present. Correlate for possible sinusitis.    The mastoid air cells are partially opacified bilaterally.    No destructive bony lesions are present.    Multiple nonspecific lymph nodes are present in the submental,   submandibular, jugular chain, and spinal accessory chain regions. No   enlarged or necrotic lymph nodes are visualized.    I discussed the exam findings with Dr. Savage at 9:20 Kristine 12/01/2017 with   readback.    IMPRESSION:    A small irregular vessel is noted adjacent to the left aspect of the   tracheostomy tube at the insertion point in the trachea; a small   pseudoaneurysm can't be excluded from a small branch of a thyroidal   artery.    < end of copied text >    Venous Dopplers: LE:   Others  < from: Transthoracic Echocardiogram (11.16.17 @ 13:25) >    Patient name: Miguel Dickens  YOB: 1970   Age: 47 (M)   MR#: [id number]  Study Date: 11/16/2017  [facility]  Study quality: Technically Difficult  Referring Physician: Rodrigue Caldera MD  Blood Pressure: 87/69 mmHg  Height: 180 cm  Weight: 97 kg  BSA: 2.2 m2  ------------------------------------------------------------------------  PROCEDURE: Transthoracic echocardiogram with 2-D, M-Mode  and complete spectral and color flow Doppler.  INDICATION: Chronic diastolic (congestive) heart failure  (I50.32)  ------------------------------------------------------------------------  DIMENSIONS:  Dimensions:     Normal Values:  LA:     3.6 cm    2.0 - 4.0 cm  Ao:     2.7 cm    2.0 - 3.8 cm  SEPTUM:   ---    0.6 - 1.2 cm  PWT:      ---     0.6 - 1.1 cm  LVIDd:    ---     3.0 - 5.6 cm  LVIDs:    ---     1.8 - 4.0 cm  ------------------------------------------------------------------------  OBSERVATIONS:  Mitral Valve: Normal mitral valve.  Aortic Root: Normal aortic root.  Aortic Valve: Aortic valve not well visualized; probably  normal.  Left Atrium: Normal left atrium.  Left Ventricle: Technically very difficult/limited left  ventricle views. Grossly  at least moderate global left  ventricular systolic dysfunction.  Right Heart: Normal right atrium. The right ventricle is  not well visualized appears grossly enlarged with decreased  function.  Normal tricuspid valve. Mild tricuspid  regurgitation. Normal pulmonic valve.  Pericardium/PleuraNormal pericardium with trace pericardial  effusion.  Hemodynamic: Estimated right ventricular systolic pressure  equals 50 mm Hg, assuming right atrial pressure equals 10  mm Hg, consistent with moderate pulmonary hypertension.  ------------------------------------------------------------------------  CONCLUSIONS:  1. Technically very difficult/limited left ventricle views.  Grossly  at least moderate global left ventricular systolic  dysfunction.  2. The right ventricle is not well visualized appears  grossly enlarged with decreased function.  3. Estimated pulmonary artery systolic pressure equals 50  mm Hg, assuming right atrial pressure equals 10  mm Hg,  consistent with moderate pulmonary hypertension.    < end of copied text >

## 2017-12-02 NOTE — PROGRESS NOTE ADULT - PROBLEM SELECTOR PLAN 1
now spiked to fever:  on zosyn, vancomycin, as well as voriconazole~  id FOLLOW UP  11/27: Cont antibiotics::  11/28: cont current treatment  11/30: continues to be on broad spectrum antibiotics:  12/01: antibiotics upgraded to meropenem :still febrile:  12/2 on Meropenem, Vanco, and Voriconazole T(F): 98.8 (02 Dec 2017 12:00), Max: 100.3 (02 Dec 2017 00:00)

## 2017-12-02 NOTE — PROGRESS NOTE ADULT - PROBLEM SELECTOR PLAN 4
Cont BD with inhaled steroids and oral steroids  11/114: Pts wheezing has significantly improved: he will need long term steroids as he starts wheezing once his steroids are dced!  11/15: Wheezing has increased: started on IV steroids high dose by thoracic team: Would suggest to decrease to 20 mg three times a day !  11/16: decrease steroids today to 20 mg three times day  11/17: try to decrease steroids in AM  11/18 Duoneb. intubated for acute respiratory distress with hypoxia  11/19 KIARA. intubated and on ventilator support  11/20: cont vent support: start weaning as tolerated!  11/21: would decrease  his steroids to 30 mg a day from tomorrow  11/22: DECREASE STEROIDS: HE WHEEZES WHEN THE STEROIDS ARE TAPERED: BUT NEED TO BE LOWERED NOW AND WATCH!!  11/23: on 40 mg of prednisone: try to decrease and taper  11/24: prednisone decreased to 30 ,g a day !!  11/25: on 30 mg a day now:  11/27: on steroids: for repeat ct scan chest today  11/28: ct reviewed with MICU attending  11/30: on bronchodilators and steroids: weaning trials  12/01:cont weaning trials as tolerated : new ct noted:not much reexpansion of ptx!!  12/2 on bronchodilators and steroid. Wean as per MICU

## 2017-12-03 LAB
ALBUMIN SERPL ELPH-MCNC: 2.3 G/DL — LOW (ref 3.3–5)
ALP SERPL-CCNC: 60 U/L — SIGNIFICANT CHANGE UP (ref 40–120)
ALT FLD-CCNC: 11 U/L — SIGNIFICANT CHANGE UP (ref 4–41)
AST SERPL-CCNC: 16 U/L — SIGNIFICANT CHANGE UP (ref 4–40)
BASOPHILS # BLD AUTO: 0.01 K/UL — SIGNIFICANT CHANGE UP (ref 0–0.2)
BASOPHILS NFR BLD AUTO: 0.1 % — SIGNIFICANT CHANGE UP (ref 0–2)
BILIRUB SERPL-MCNC: 0.2 MG/DL — SIGNIFICANT CHANGE UP (ref 0.2–1.2)
BUN SERPL-MCNC: 15 MG/DL — SIGNIFICANT CHANGE UP (ref 7–23)
CALCIUM SERPL-MCNC: 8.5 MG/DL — SIGNIFICANT CHANGE UP (ref 8.4–10.5)
CHLORIDE SERPL-SCNC: 96 MMOL/L — LOW (ref 98–107)
CO2 SERPL-SCNC: 37 MMOL/L — HIGH (ref 22–31)
CREAT SERPL-MCNC: 0.59 MG/DL — SIGNIFICANT CHANGE UP (ref 0.5–1.3)
EOSINOPHIL # BLD AUTO: 0.05 K/UL — SIGNIFICANT CHANGE UP (ref 0–0.5)
EOSINOPHIL NFR BLD AUTO: 0.6 % — SIGNIFICANT CHANGE UP (ref 0–6)
GLUCOSE BLDC GLUCOMTR-MCNC: 103 MG/DL — HIGH (ref 70–99)
GLUCOSE BLDC GLUCOMTR-MCNC: 108 MG/DL — HIGH (ref 70–99)
GLUCOSE BLDC GLUCOMTR-MCNC: 122 MG/DL — HIGH (ref 70–99)
GLUCOSE BLDC GLUCOMTR-MCNC: 156 MG/DL — HIGH (ref 70–99)
GLUCOSE SERPL-MCNC: 139 MG/DL — HIGH (ref 70–99)
HCT VFR BLD CALC: 20.6 % — CRITICAL LOW (ref 39–50)
HCT VFR BLD CALC: 23.7 % — LOW (ref 39–50)
HGB BLD-MCNC: 6.4 G/DL — CRITICAL LOW (ref 13–17)
HGB BLD-MCNC: 7.6 G/DL — LOW (ref 13–17)
IMM GRANULOCYTES # BLD AUTO: 0.06 # — SIGNIFICANT CHANGE UP
IMM GRANULOCYTES NFR BLD AUTO: 0.7 % — SIGNIFICANT CHANGE UP (ref 0–1.5)
LYMPHOCYTES # BLD AUTO: 0.92 K/UL — LOW (ref 1–3.3)
LYMPHOCYTES # BLD AUTO: 10.9 % — LOW (ref 13–44)
MAGNESIUM SERPL-MCNC: 2.1 MG/DL — SIGNIFICANT CHANGE UP (ref 1.6–2.6)
MCHC RBC-ENTMCNC: 30.5 PG — SIGNIFICANT CHANGE UP (ref 27–34)
MCHC RBC-ENTMCNC: 30.5 PG — SIGNIFICANT CHANGE UP (ref 27–34)
MCHC RBC-ENTMCNC: 31.1 % — LOW (ref 32–36)
MCHC RBC-ENTMCNC: 32.1 % — SIGNIFICANT CHANGE UP (ref 32–36)
MCV RBC AUTO: 95.2 FL — SIGNIFICANT CHANGE UP (ref 80–100)
MCV RBC AUTO: 98.1 FL — SIGNIFICANT CHANGE UP (ref 80–100)
MONOCYTES # BLD AUTO: 0.98 K/UL — HIGH (ref 0–0.9)
MONOCYTES NFR BLD AUTO: 11.6 % — SIGNIFICANT CHANGE UP (ref 2–14)
NEUTROPHILS # BLD AUTO: 6.44 K/UL — SIGNIFICANT CHANGE UP (ref 1.8–7.4)
NEUTROPHILS NFR BLD AUTO: 76.1 % — SIGNIFICANT CHANGE UP (ref 43–77)
NRBC # FLD: 0 — SIGNIFICANT CHANGE UP
NRBC # FLD: 0 — SIGNIFICANT CHANGE UP
PHOSPHATE SERPL-MCNC: 3.2 MG/DL — SIGNIFICANT CHANGE UP (ref 2.5–4.5)
PLATELET # BLD AUTO: 151 K/UL — SIGNIFICANT CHANGE UP (ref 150–400)
PLATELET # BLD AUTO: 152 K/UL — SIGNIFICANT CHANGE UP (ref 150–400)
PMV BLD: 12.9 FL — SIGNIFICANT CHANGE UP (ref 7–13)
PMV BLD: 13 FL — SIGNIFICANT CHANGE UP (ref 7–13)
POTASSIUM SERPL-MCNC: 3.6 MMOL/L — SIGNIFICANT CHANGE UP (ref 3.5–5.3)
POTASSIUM SERPL-SCNC: 3.6 MMOL/L — SIGNIFICANT CHANGE UP (ref 3.5–5.3)
PROT SERPL-MCNC: 5.5 G/DL — LOW (ref 6–8.3)
RBC # BLD: 2.1 M/UL — LOW (ref 4.2–5.8)
RBC # BLD: 2.49 M/UL — LOW (ref 4.2–5.8)
RBC # FLD: 15.6 % — HIGH (ref 10.3–14.5)
RBC # FLD: 17.1 % — HIGH (ref 10.3–14.5)
SODIUM SERPL-SCNC: 143 MMOL/L — SIGNIFICANT CHANGE UP (ref 135–145)
WBC # BLD: 8.21 K/UL — SIGNIFICANT CHANGE UP (ref 3.8–10.5)
WBC # BLD: 8.46 K/UL — SIGNIFICANT CHANGE UP (ref 3.8–10.5)
WBC # FLD AUTO: 8.21 K/UL — SIGNIFICANT CHANGE UP (ref 3.8–10.5)
WBC # FLD AUTO: 8.46 K/UL — SIGNIFICANT CHANGE UP (ref 3.8–10.5)

## 2017-12-03 PROCEDURE — 71010: CPT | Mod: 26

## 2017-12-03 PROCEDURE — 99291 CRITICAL CARE FIRST HOUR: CPT

## 2017-12-03 RX ORDER — ACETAMINOPHEN 500 MG
650 TABLET ORAL ONCE
Qty: 0 | Refills: 0 | Status: COMPLETED | OUTPATIENT
Start: 2017-12-03 | End: 2017-12-03

## 2017-12-03 RX ADMIN — VORICONAZOLE 125 MILLIGRAM(S): 10 INJECTION, POWDER, LYOPHILIZED, FOR SOLUTION INTRAVENOUS at 13:01

## 2017-12-03 RX ADMIN — Medication 650 MILLIGRAM(S): at 20:55

## 2017-12-03 RX ADMIN — ALBUTEROL 2 PUFF(S): 90 AEROSOL, METERED ORAL at 03:37

## 2017-12-03 RX ADMIN — Medication 1.41 MICROGRAM(S)/KG/MIN: at 08:46

## 2017-12-03 RX ADMIN — CHLORHEXIDINE GLUCONATE 1 APPLICATION(S): 213 SOLUTION TOPICAL at 12:01

## 2017-12-03 RX ADMIN — SENNA PLUS 10 MILLILITER(S): 8.6 TABLET ORAL at 21:46

## 2017-12-03 RX ADMIN — Medication 1 PUFF(S): at 09:53

## 2017-12-03 RX ADMIN — ENOXAPARIN SODIUM 40 MILLIGRAM(S): 100 INJECTION SUBCUTANEOUS at 12:01

## 2017-12-03 RX ADMIN — Medication 500 MILLIGRAM(S): at 05:16

## 2017-12-03 RX ADMIN — VORICONAZOLE 125 MILLIGRAM(S): 10 INJECTION, POWDER, LYOPHILIZED, FOR SOLUTION INTRAVENOUS at 23:26

## 2017-12-03 RX ADMIN — MEROPENEM 100 MILLIGRAM(S): 1 INJECTION INTRAVENOUS at 22:18

## 2017-12-03 RX ADMIN — Medication 250 MILLIGRAM(S): at 14:21

## 2017-12-03 RX ADMIN — BUDESONIDE AND FORMOTEROL FUMARATE DIHYDRATE 2 PUFF(S): 160; 4.5 AEROSOL RESPIRATORY (INHALATION) at 21:24

## 2017-12-03 RX ADMIN — Medication 650 MILLIGRAM(S): at 20:13

## 2017-12-03 RX ADMIN — BUDESONIDE AND FORMOTEROL FUMARATE DIHYDRATE 2 PUFF(S): 160; 4.5 AEROSOL RESPIRATORY (INHALATION) at 09:53

## 2017-12-03 RX ADMIN — Medication 500 MILLIGRAM(S): at 18:39

## 2017-12-03 RX ADMIN — FENTANYL CITRATE 1 PATCH: 50 INJECTION INTRAVENOUS at 12:01

## 2017-12-03 RX ADMIN — POLYETHYLENE GLYCOL 3350 17 GRAM(S): 17 POWDER, FOR SOLUTION ORAL at 21:46

## 2017-12-03 RX ADMIN — ALBUTEROL 2 PUFF(S): 90 AEROSOL, METERED ORAL at 15:37

## 2017-12-03 RX ADMIN — Medication 1 PUFF(S): at 03:37

## 2017-12-03 RX ADMIN — Medication 300 MILLIGRAM(S): at 12:02

## 2017-12-03 RX ADMIN — Medication 1 PUFF(S): at 21:24

## 2017-12-03 RX ADMIN — DEXMEDETOMIDINE HYDROCHLORIDE IN 0.9% SODIUM CHLORIDE 5.62 MICROGRAM(S)/KG/HR: 4 INJECTION INTRAVENOUS at 05:17

## 2017-12-03 RX ADMIN — ALBUTEROL 2 PUFF(S): 90 AEROSOL, METERED ORAL at 21:24

## 2017-12-03 RX ADMIN — Medication 1: at 05:26

## 2017-12-03 RX ADMIN — FENTANYL CITRATE 1 PATCH: 50 INJECTION INTRAVENOUS at 12:02

## 2017-12-03 RX ADMIN — MEROPENEM 100 MILLIGRAM(S): 1 INJECTION INTRAVENOUS at 15:44

## 2017-12-03 RX ADMIN — Medication 20 MILLIGRAM(S): at 05:17

## 2017-12-03 RX ADMIN — ALBUTEROL 2 PUFF(S): 90 AEROSOL, METERED ORAL at 09:53

## 2017-12-03 RX ADMIN — DEXMEDETOMIDINE HYDROCHLORIDE IN 0.9% SODIUM CHLORIDE 5.62 MICROGRAM(S)/KG/HR: 4 INJECTION INTRAVENOUS at 08:45

## 2017-12-03 RX ADMIN — Medication 1.41 MICROGRAM(S)/KG/MIN: at 05:17

## 2017-12-03 RX ADMIN — Medication 1 PUFF(S): at 15:37

## 2017-12-03 RX ADMIN — MEROPENEM 100 MILLIGRAM(S): 1 INJECTION INTRAVENOUS at 05:16

## 2017-12-03 RX ADMIN — Medication 250 MILLIGRAM(S): at 21:45

## 2017-12-03 RX ADMIN — Medication 250 MILLIGRAM(S): at 05:16

## 2017-12-03 RX ADMIN — Medication 1 MILLIGRAM(S): at 12:02

## 2017-12-03 NOTE — PROGRESS NOTE ADULT - ASSESSMENT
47M with hx of sarcoidosis, started on Eqliuis for cerebrovascular infarct with PFO on echo and paroxysmal afib in September c/b recurrent hemoptysis (Eliquis d/c'd on 9/20, then restarted between 10/23-27 after hemoptysis improved), d/c'd to rehab then returned 11/9 for recurrent hemoptysis requiring intubation and CTICU admission for management of hemoptysis including IR embolization (11/16) and serial bronch (11/17 and 11/20) in CTICU with no active bleeding visualized, now remains intubated with difficulty weaning vent as during CPAP trials pt fails to initiate breaths, unclear etiology. Transferred to MICU from CTICU for continued vent management and attempted weaning. Bedside trach placed 11/24 c/b episode of hypoxia and bleeding around trach site after suctioning, resolved after surgicel and pressure. Has been febrile intermittently, though improving, grew Klebsiella on urine cx, blood cx with staph epi. Now with recurrent bloody secretions from trach s/p rpt bronch 11/30, spiked another temp 12/1/17 (102.7) and repeat cultures drawn. Overnight drop in H/H, transfused 1U PRBC    # Neuro:  -back on sedation this morning  -able to move all extremities  -PT for increase mobilization  -D/c sedation, precedex if needed    #Cardiovascular:  -has been intermittently on pressors, currently on     #Respiratory:  -Trach placed 11/24  -L upper anterior pigtail placed with improvement in pulmonary function and moderate re-expansion of the lung.  -Continue bronchodilators and pulmonary toilet  -prednisone at 20mg  -Recurrent bloody secretions from trach, improving  -CT neck from yesterday with ?vessel irregularity on left adjacent to trach; small branch off thyroid artery (?pseudoaneruysm)   -Pigtail clamped today; monitor peak pressures, rpt CXR/US  -CPAP trial    #GI:  -on tube feeds, may need evaluation for PEG    #Renal:  -Monitor I's and O's    #Heme:  -H/H stable; baseline Hb 9-10;     #ID:  -Aspergilloma dx 9/24/17, on voriconazole x 6 weeks per ID recommendations. Recommend continuing voriconazole given hemoptysis recurred after was stopped, possible related to inflammation 2/2 aspergilloma and possible continued infection.  -UA grossly positive, pt grew Klebsiella 100K colonies. Pansensitive. Deescalated zosyn to ceftriaxone, now re-escalated to meropenem in setting of recurrent fever with antibiotics  -Blood cx negative from 11/29 and 11/30; consider d/c vancomycin (grew staph epi in blood from 11/25 and 11/26)--had been started 11/28  -Repeat blood cx drawn 11/30, negative  -Finish abx tomorrow    #Endocrine:   -On tube feeds, increased per dietary's recommendations FS q6h, ISS,    #DVT PPX: octavio Cantu, PGY3  Emergency Medicine  [id number] 47M with hx of sarcoidosis, started on Eqliuis for cerebrovascular infarct with PFO on echo and paroxysmal afib in September c/b recurrent hemoptysis (Eliquis d/c'd on 9/20, then restarted between 10/23-27 after hemoptysis improved), d/c'd to rehab then returned 11/9 for recurrent hemoptysis requiring intubation and CTICU admission for management of hemoptysis including IR embolization (11/16) and serial bronch (11/17 and 11/20) in CTICU with no active bleeding visualized, now remains intubated with difficulty weaning vent as during CPAP trials pt fails to initiate breaths, unclear etiology. Transferred to MICU from CTICU for continued vent management and attempted weaning. Bedside trach placed 11/24 c/b episode of hypoxia and bleeding around trach site after suctioning, resolved after surgicel and pressure. Has been febrile intermittently, though improving, grew Klebsiella on urine cx, blood cx with staph epi. Now with recurrent bloody secretions from trach s/p rpt bronch 11/30, spiked another temp 12/1/17 (102.7) and repeat cultures drawn. Overnight drop in H/H, transfused 1U PRBC    # Neuro:  -On precedex and fentanyl patch, improvement in anxiety with these medications  -Keep as alert as possible    #Cardiovascular:  -has been intermittently on pressors, currently on small amount levo to maintain MAP > 65.    #Respiratory:  -Trach placed 11/24  -L upper anterior pigtail placed with improvement in pulmonary function and moderate re-expansion of the lung.  -Continue bronchodilators and pulmonary toilet  -prednisone decrease to 10mg today  -Did well on CPAP yesterday, cont aggressive weaning  -Rpt CXR this morning as pigtail clamped 12/2  -Bloody secretions from trach initially, now improving  -CT neck from 12/1 with ?vessel irregularity on left adjacent to trach; small branch off thyroid artery (?pseudoaneruysm)     #GI:  -on tube feeds, will need evaluation for PEG (d/w GI)    #Renal:  -Monitor I's and O's    #Heme:  -Drop in hemoglobin from baseline 7-8 to 6, unclear why. Consented for 1U PRBC.     #ID:  -Aspergilloma dx 9/24/17, on voriconazole x 6 weeks per ID recommendations. Recommend continuing voriconazole given hemoptysis recurred after was stopped, possible related to inflammation 2/2 aspergilloma and possible continued infection.  -Afebrile and improved, recent cultures all negative, d/c abx today    #Endocrine:   -On tube feeds, increased per dietary's recommendations FS q6h, ISS,    #DVT PPX: octavio Cantu, PGY3  Emergency Medicine  [id number]

## 2017-12-03 NOTE — PROGRESS NOTE ADULT - PROBLEM SELECTOR PLAN 4
Cont BD with inhaled steroids and oral steroids  11/114: Pts wheezing has significantly improved: he will need long term steroids as he starts wheezing once his steroids are dced!  11/15: Wheezing has increased: started on IV steroids high dose by thoracic team: Would suggest to decrease to 20 mg three times a day !  11/16: decrease steroids today to 20 mg three times day  11/17: try to decrease steroids in AM  11/18 Duoneb. intubated for acute respiratory distress with hypoxia  11/19 KIARA. intubated and on ventilator support  11/20: cont vent support: start weaning as tolerated!  11/21: would decrease  his steroids to 30 mg a day from tomorrow  11/22: DECREASE STEROIDS: HE WHEEZES WHEN THE STEROIDS ARE TAPERED: BUT NEED TO BE LOWERED NOW AND WATCH!!  11/23: on 40 mg of prednisone: try to decrease and taper  11/24: prednisone decreased to 30 ,g a day !!  11/25: on 30 mg a day now:  11/27: on steroids: for repeat ct scan chest today  11/28: ct reviewed with MICU attending  11/30: on bronchodilators and steroids: weaning trials  12/01:cont weaning trials as tolerated : new ct noted:not much reexpansion of ptx!!  12/2 on bronchodilators and steroid. Wean as per MICU  12/3 continue current treatment

## 2017-12-03 NOTE — PROGRESS NOTE ADULT - SUBJECTIVE AND OBJECTIVE BOX
Patient is a 47y old  Male who presents with a chief complaint of hemoptysis (09 Nov 2017 00:32)      Pertinent ROS:     MEDICATIONS  (STANDING):  ALBUTerol    90 MICROgram(s) HFA Inhaler 2 Puff(s) Inhalation every 6 hours  buDESOnide 160 MICROgram(s)/formoterol 4.5 MICROgram(s) Inhaler 2 Puff(s) Inhalation two times a day  chlorhexidine 4% Liquid 1 Application(s) Topical daily  dexmedetomidine Infusion 0.3 MICROgram(s)/kG/Hr (5.625 mL/Hr) IV Continuous <Continuous>  enoxaparin Injectable 40 milliGRAM(s) SubCutaneous daily  ferrous    sulfate Liquid 300 milliGRAM(s) Enteral Tube daily  folic acid 1 milliGRAM(s) Oral daily  insulin lispro (HumaLOG) corrective regimen sliding scale   SubCutaneous every 6 hours  ipratropium 17 MICROgram(s) HFA Inhaler 1 Puff(s) Inhalation every 6 hours  meropenem IVPB      meropenem IVPB 1000 milliGRAM(s) IV Intermittent every 8 hours  norepinephrine Infusion 0.01 MICROgram(s)/kG/Min (1.406 mL/Hr) IV Continuous <Continuous>  polyethylene glycol 3350 17 Gram(s) Oral at bedtime  predniSONE   Tablet 10 milliGRAM(s) Oral daily  senna Syrup 10 milliLiter(s) Oral at bedtime  valproic  acid Syrup 500 milliGRAM(s) Oral two times a day  vancomycin  IVPB 1000 milliGRAM(s) IV Intermittent every 8 hours  voriconazole IVPB 300 milliGRAM(s) IV Intermittent every 12 hours    MEDICATIONS  (PRN):  acetaminophen   Tablet 650 milliGRAM(s) Oral every 6 hours PRN For Temp greater than 38 C (100.4 F)    Vital Signs Last 24 Hrs  T(C): 37.1 (03 Dec 2017 12:00), Max: 37.1 (03 Dec 2017 12:00)  T(F): 98.8 (03 Dec 2017 12:00), Max: 98.8 (03 Dec 2017 12:00)  HR: 72 (03 Dec 2017 13:00) (63 - 85)  BP: 120/78 (03 Dec 2017 13:00) (84/52 - 129/90)  BP(mean): 88 (03 Dec 2017 13:00) (59 - 97)  RR: 14 (03 Dec 2017 13:00) (12 - 23)  SpO2: 100% (03 Dec 2017 13:00) (95% - 100%)  Mode: AC/ CMV (Assist Control/ Continuous Mandatory Ventilation)  RR (machine): 12  TV (machine): 500  FiO2: 40  PEEP: 5  MAP: 10  PIP: 23    I&O's Summary    02 Dec 2017 07:01  -  03 Dec 2017 07:00  --------------------------------------------------------  IN: 1616 mL / OUT: 730 mL / NET: 886 mL    03 Dec 2017 07:01  -  03 Dec 2017 14:05  --------------------------------------------------------  IN: 987 mL / OUT: 80 mL / NET: 907 mL        Physical Exam:   Const:  Respiratory:   CVS:  GI:  CNS  SKIN  :     Labs:                              6.4    8.46  )-----------( 151      ( 03 Dec 2017 02:30 )             20.6     12-03    143  |  96<L>  |  15  ----------------------------<  139<H>  3.6   |  37<H>  |  0.59    Ca    8.5      03 Dec 2017 02:30  Phos  3.2     12-03  Mg     2.1     12-03    TPro  5.5<L>  /  Alb  2.3<L>  /  TBili  0.2  /  DBili  x   /  AST  16  /  ALT  11  /  AlkPhos  60  12-03    CAPILLARY BLOOD GLUCOSE      POCT Blood Glucose.: 122 mg/dL (03 Dec 2017 11:59)  POCT Blood Glucose.: 156 mg/dL (03 Dec 2017 05:25)  POCT Blood Glucose.: 121 mg/dL (02 Dec 2017 23:45)  POCT Blood Glucose.: 123 mg/dL (02 Dec 2017 17:13)    LIVER FUNCTIONS - ( 03 Dec 2017 02:30 )  Alb: 2.3 g/dL / Pro: 5.5 g/dL / ALK PHOS: 60 u/L / ALT: 11 u/L / AST: 16 u/L / GGT: x               D DImer  Cultures:           Wound culture:                12-01 @ 06:18  Organism --  Culture w/ gram stain --  Specimen Source BLOOD PERIPHERAL    Wound culture:                11-29 @ 15:21  Organism --  Culture w/ gram stain --  Specimen Source BLOOD VENOUS    Wound culture:                11-29 @ 04:37  Organism --  Culture w/ gram stain --  Specimen Source BLOOD    Wound culture:                11-26 @ 22:40  Organism --  Culture w/ gram stain --  Specimen Source BLOOD      Abscess culture:             12-01 @ 06:18  Organism --  Gram Stain --  Specimen Source BLOOD PERIPHERAL    Abscess culture:             11-29 @ 15:21  Organism --  Gram Stain --  Specimen Source BLOOD VENOUS    Abscess culture:             11-29 @ 04:37  Organism --  Gram Stain --  Specimen Source BLOOD    Abscess culture:             11-26 @ 22:40  Organism --  Gram Stain --  Specimen Source BLOOD        Tissue culture:           12-01 @ 06:18  Organism --  Gram Stain --  Specimen Source BLOOD PERIPHERAL    Tissue culture:           11-29 @ 15:21  Organism --  Gram Stain --  Specimen Source BLOOD VENOUS    Tissue culture:           11-29 @ 04:37  Organism --  Gram Stain --  Specimen Source BLOOD    Tissue culture:           11-26 @ 22:40  Organism --  Gram Stain --  Specimen Source BLOOD      Body Fluid Smear & Culture:                        12-01 @ 06:18  AFB Smear  --  Culture Acid Fast Body Fluid w/ Smear  --  Culture Acid Fast Smear Concentrated   --    Culture Results:     --  Specimen Source BLOOD PERIPHERAL    Body Fluid Smear & Culture:                        11-29 @ 15:21  AFB Smear  --  Culture Acid Fast Body Fluid w/ Smear  --  Culture Acid Fast Smear Concentrated   --    Culture Results:     --  Specimen Source BLOOD VENOUS    Body Fluid Smear & Culture:                        11-29 @ 04:37  AFB Smear  --  Culture Acid Fast Body Fluid w/ Smear  --  Culture Acid Fast Smear Concentrated   --    Culture Results:     --  Specimen Source BLOOD    Body Fluid Smear & Culture:                        11-26 @ 22:40  AFB Smear  --  Culture Acid Fast Body Fluid w/ Smear  --  Culture Acid Fast Smear Concentrated   --    Culture Results:     --  Specimen Source BLOOD      Studies  Chest X-RAY < from: Xray Chest 1 View AP -PORTABLE-Routine (12.03.17 @ 08:11) >    EXAM:  RAD CHEST PORTABLE ROUTINE      EXAM:  RAD CHEST PORTABLE URGENT      EXAM:  RAD CHEST PORTABLE ROUTINE        PROCEDURE DATE:  Dec  2 2017         INTERPRETATION:  TIME OF EXAM: December 2, 2017 at 4:41 PM; 4:45 PM;   December 3 at 7:54 AM.    CLINICAL INFORMATION: NG tube placement; sarcoidosis; pneumothorax.    TECHNIQUE:   Portable chest    INTERPRETATION:     December 2:  4:41 PM:  NG tube is seen with its tip at the level of the cardioesophageal   junction. No change in the chronicalterations in the left lung with a   left-sided pleural catheter present.    Persistent contained air   overlying the site of left upper lobectomy.    4:45 PM:  Since the last study, the NG tube has been advanced and is now in the   proximal stomach. No other changes.    December 3 at 7:54 AM:  Tracheostomy and enteric tube in place in addition to the left sided   pleural catheter.      COMPARISON:  CT chest December 1, 2017      IMPRESSION:  Follow-up studies with enteric tube in stomach on most   recent study and with no significant interval change in air collection   overlying site of recent left upper lobectomy.    < end of copied text >    CT SCAN Chest < from: CT Chest w/ IV Cont (12.01.17 @ 08:08) >  EXAM:  CT CHEST IC        PROCEDURE DATE:  Dec  1 2017         INTERPRETATION:  CT CHEST WITHOUT CONTRAST    INDICATION: Bleeding from trach site and hemoptysis.    TECHNIQUE: Unenhanced helical images were obtained of the chest. Coronal   and sagittal images were reconstructed. Maximum intensity projection   images were generated. The study was obtained in conjunction with a   contrast-enhanced neck CT.    COMPARISON: CT chest dated 11/25/2017.    FINDINGS:     Tubes/Lines: Status post tracheostomy. An enteric tube courses through   the esophagus with its distal tip within the stomach.    Lungs And Airways: Secretions are noted within the left lower lobe   bronchi. Status post left upper lobe lobectomy. The herniation of lung.   The left fifth anterior rib interspace has resolved. The air within the   left hemithorax remains. The paramediastinal consolidation and cystic   changes in the lungs remain unchanged. Bilateral lower lobe nodules are   unchanged.     New patchy consolidations and ground was opacities are noted within the   bilateral lower lobes.    Pleura:There is a small right pleural effusion and trace left pleural   effusions are unchanged.  There is been interval placement of a left   anterior pigtail catheter within the left upper lobe lobectomy bed. The   airways are extrinsic to the left lower lobe has slightly decreased   following placement of pigtail catheter.     Mediastinum: There are no enlarged chest lymph nodes. The visualized   portion of the thyroid gland is unremarkable.       Heart and Vasculature: The heart is normal in size.  There is no   pericardial effusion.   No coronary artery disease.    The main pulmonary artery is normal in caliber.  Left-sided aortic arch   and left-sided descending thoracic aorta. No aneurysm. Atheromatous   disease of the aorta.    Upper Abdomen: Punctate nonobstructing bilateral renal calculi.    Bones And Soft Tissues: Left seventh rib thoracotomy rib defect.  The   soft tissues are unremarkable.    IMPRESSION:    1.  Bilateral lobe tree-in-bud opacities with new patchy bilateral lobe   consolidations likely represent pneumonia.  2.  Small right and trace left pleural effusions.  3.  Interval placement of a left anterior approach pigtail catheter with   interval slight decrease in size of air within the left upper lobe   lobectomy bed.    < end of copied text >    CT Abdomen   Venous Dopplers: LE: < from: US Duplex Venous Lower Ext Complete, Bilateral (10.24.17 @ 11:42) >  IMPRESSION:     No evidence of bilateral lower extremity deep venous thrombosis.    < end of copied text >    Others  < from: CT Neck Soft Tissue w/ IV Cont (12.01.17 @ 08:09) >  IMPRESSION:    A small irregular vessel is noted adjacent to the left aspect of the   tracheostomy tube at the insertion point in the trachea; a small   pseudoaneurysm can't be excluded from a small branch of a thyroidal   artery.    < end of copied text >    < from: Transthoracic Echocardiogram (11.16.17 @ 13:25) >  ------------------------------------------------------------------------  CONCLUSIONS:  1. Technically very difficult/limited left ventricle views.  Grossly  at least moderate global left ventricular systolic  dysfunction.  2. The right ventricle is not well visualized appears  grossly enlarged with decreased function.  3. Estimated pulmonary artery systolic pressure equals 50  mm Hg, assuming right atrial pressure equals 10  mm Hg,  consistent with moderate pulmonary hypertension.  *** Compared with echocardiogram of 9/26/2017, LV function  has decreased.  -------------------------------------    < end of copied text > Patient is a 47y old  Male who presents with a chief complaint of hemoptysis (09 Nov 2017 00:32)      Pertinent ROS: awake, alert, off pressor this moment. Tracheostomy to ventilator, Chest tube (+).    MEDICATIONS  (STANDING):  ALBUTerol    90 MICROgram(s) HFA Inhaler 2 Puff(s) Inhalation every 6 hours  buDESOnide 160 MICROgram(s)/formoterol 4.5 MICROgram(s) Inhaler 2 Puff(s) Inhalation two times a day  chlorhexidine 4% Liquid 1 Application(s) Topical daily  dexmedetomidine Infusion 0.3 MICROgram(s)/kG/Hr (5.625 mL/Hr) IV Continuous <Continuous>  enoxaparin Injectable 40 milliGRAM(s) SubCutaneous daily  ferrous    sulfate Liquid 300 milliGRAM(s) Enteral Tube daily  folic acid 1 milliGRAM(s) Oral daily  insulin lispro (HumaLOG) corrective regimen sliding scale   SubCutaneous every 6 hours  ipratropium 17 MICROgram(s) HFA Inhaler 1 Puff(s) Inhalation every 6 hours  meropenem IVPB      meropenem IVPB 1000 milliGRAM(s) IV Intermittent every 8 hours  norepinephrine Infusion 0.01 MICROgram(s)/kG/Min (1.406 mL/Hr) IV Continuous <Continuous>  polyethylene glycol 3350 17 Gram(s) Oral at bedtime  predniSONE   Tablet 10 milliGRAM(s) Oral daily  senna Syrup 10 milliLiter(s) Oral at bedtime  valproic  acid Syrup 500 milliGRAM(s) Oral two times a day  vancomycin  IVPB 1000 milliGRAM(s) IV Intermittent every 8 hours  voriconazole IVPB 300 milliGRAM(s) IV Intermittent every 12 hours    MEDICATIONS  (PRN):  acetaminophen   Tablet 650 milliGRAM(s) Oral every 6 hours PRN For Temp greater than 38 C (100.4 F)    Vital Signs Last 24 Hrs  T(C): 37.1 (03 Dec 2017 12:00), Max: 37.1 (03 Dec 2017 12:00)  T(F): 98.8 (03 Dec 2017 12:00), Max: 98.8 (03 Dec 2017 12:00)  HR: 72 (03 Dec 2017 13:00) (63 - 85)  BP: 120/78 (03 Dec 2017 13:00) (84/52 - 129/90)  BP(mean): 88 (03 Dec 2017 13:00) (59 - 97)  RR: 14 (03 Dec 2017 13:00) (12 - 23)  SpO2: 100% (03 Dec 2017 13:00) (95% - 100%)  Mode: AC/ CMV (Assist Control/ Continuous Mandatory Ventilation)  RR (machine): 12  TV (machine): 500  FiO2: 40  PEEP: 5  MAP: 10  PIP: 23    I&O's Summary    02 Dec 2017 07:01  -  03 Dec 2017 07:00  --------------------------------------------------------  IN: 1616 mL / OUT: 730 mL / NET: 886 mL    03 Dec 2017 07:01  -  03 Dec 2017 14:05  --------------------------------------------------------  IN: 987 mL / OUT: 80 mL / NET: 907 mL        Physical Exam:   Const: NAD  Respiratory: Coarse breath sounds, Trach (+) to ventilator, expiratory wheezing without distress  CVS: S1, S2 no murmur, SR on tele  GI: abd soft non-tender, BS, slightly distended, rectal tube (+)  CNS awake, alert, follows simple commands   SKIN: Dry normal color, normal turgor  EXTS; edema (+) upper and lower extremities     Labs:                      6.4    8.46  )-----------( 151      ( 03 Dec 2017 02:30 )             20.6     12-03    143  |  96<L>  |  15  ----------------------------<  139<H>  3.6   |  37<H>  |  0.59    Ca    8.5      03 Dec 2017 02:30  Phos  3.2     12-03  Mg     2.1     12-03    TPro  5.5<L>  /  Alb  2.3<L>  /  TBili  0.2  /  DBili  x   /  AST  16  /  ALT  11  /  AlkPhos  60  12-03    CAPILLARY BLOOD GLUCOSE      POCT Blood Glucose.: 122 mg/dL (03 Dec 2017 11:59)  POCT Blood Glucose.: 156 mg/dL (03 Dec 2017 05:25)  POCT Blood Glucose.: 121 mg/dL (02 Dec 2017 23:45)  POCT Blood Glucose.: 123 mg/dL (02 Dec 2017 17:13)    LIVER FUNCTIONS - ( 03 Dec 2017 02:30 )  Alb: 2.3 g/dL / Pro: 5.5 g/dL / ALK PHOS: 60 u/L / ALT: 11 u/L / AST: 16 u/L / GGT: x               D DImer  Cultures:           Wound culture:                12-01 @ 06:18  Organism --  Culture w/ gram stain --  Specimen Source BLOOD PERIPHERAL    Wound culture:                11-29 @ 15:21  Organism --  Culture w/ gram stain --  Specimen Source BLOOD VENOUS    Wound culture:                11-29 @ 04:37  Organism --  Culture w/ gram stain --  Specimen Source BLOOD    Wound culture:                11-26 @ 22:40  Organism --  Culture w/ gram stain --  Specimen Source BLOOD      Abscess culture:             12-01 @ 06:18  Organism --  Gram Stain --  Specimen Source BLOOD PERIPHERAL    Abscess culture:             11-29 @ 15:21  Organism --  Gram Stain --  Specimen Source BLOOD VENOUS    Abscess culture:             11-29 @ 04:37  Organism --  Gram Stain --  Specimen Source BLOOD    Abscess culture:             11-26 @ 22:40  Organism --  Gram Stain --  Specimen Source BLOOD        Tissue culture:           12-01 @ 06:18  Organism --  Gram Stain --  Specimen Source BLOOD PERIPHERAL    Tissue culture:           11-29 @ 15:21  Organism --  Gram Stain --  Specimen Source BLOOD VENOUS    Tissue culture:           11-29 @ 04:37  Organism --  Gram Stain --  Specimen Source BLOOD    Tissue culture:           11-26 @ 22:40  Organism --  Gram Stain --  Specimen Source BLOOD      Body Fluid Smear & Culture:                        12-01 @ 06:18  AFB Smear  --  Culture Acid Fast Body Fluid w/ Smear  --  Culture Acid Fast Smear Concentrated   --    Culture Results:     --  Specimen Source BLOOD PERIPHERAL    Body Fluid Smear & Culture:                        11-29 @ 15:21  AFB Smear  --  Culture Acid Fast Body Fluid w/ Smear  --  Culture Acid Fast Smear Concentrated   --    Culture Results:     --  Specimen Source BLOOD VENOUS    Body Fluid Smear & Culture:                        11-29 @ 04:37  AFB Smear  --  Culture Acid Fast Body Fluid w/ Smear  --  Culture Acid Fast Smear Concentrated   --    Culture Results:     --  Specimen Source BLOOD    Body Fluid Smear & Culture:                        11-26 @ 22:40  AFB Smear  --  Culture Acid Fast Body Fluid w/ Smear  --  Culture Acid Fast Smear Concentrated   --    Culture Results:     --  Specimen Source BLOOD      Studies  Chest X-RAY < from: Xray Chest 1 View AP -PORTABLE-Routine (12.03.17 @ 08:11) >    EXAM:  RAD CHEST PORTABLE ROUTINE      EXAM:  RAD CHEST PORTABLE URGENT      EXAM:  RAD CHEST PORTABLE ROUTINE        PROCEDURE DATE:  Dec  2 2017         INTERPRETATION:  TIME OF EXAM: December 2, 2017 at 4:41 PM; 4:45 PM;   December 3 at 7:54 AM.    CLINICAL INFORMATION: NG tube placement; sarcoidosis; pneumothorax.    TECHNIQUE:   Portable chest    INTERPRETATION:     December 2:  4:41 PM:  NG tube is seen with its tip at the level of the cardioesophageal   junction. No change in the chronicalterations in the left lung with a   left-sided pleural catheter present.    Persistent contained air   overlying the site of left upper lobectomy.    4:45 PM:  Since the last study, the NG tube has been advanced and is now in the   proximal stomach. No other changes.    December 3 at 7:54 AM:  Tracheostomy and enteric tube in place in addition to the left sided   pleural catheter.      COMPARISON:  CT chest December 1, 2017      IMPRESSION:  Follow-up studies with enteric tube in stomach on most   recent study and with no significant interval change in air collection   overlying site of recent left upper lobectomy.    < end of copied text >    CT SCAN Chest < from: CT Chest w/ IV Cont (12.01.17 @ 08:08) >  EXAM:  CT CHEST IC        PROCEDURE DATE:  Dec  1 2017         INTERPRETATION:  CT CHEST WITHOUT CONTRAST    INDICATION: Bleeding from trach site and hemoptysis.    TECHNIQUE: Unenhanced helical images were obtained of the chest. Coronal   and sagittal images were reconstructed. Maximum intensity projection   images were generated. The study was obtained in conjunction with a   contrast-enhanced neck CT.    COMPARISON: CT chest dated 11/25/2017.    FINDINGS:     Tubes/Lines: Status post tracheostomy. An enteric tube courses through   the esophagus with its distal tip within the stomach.    Lungs And Airways: Secretions are noted within the left lower lobe   bronchi. Status post left upper lobe lobectomy. The herniation of lung.   The left fifth anterior rib interspace has resolved. The air within the   left hemithorax remains. The paramediastinal consolidation and cystic   changes in the lungs remain unchanged. Bilateral lower lobe nodules are   unchanged.     New patchy consolidations and ground was opacities are noted within the   bilateral lower lobes.    Pleura:There is a small right pleural effusion and trace left pleural   effusions are unchanged.  There is been interval placement of a left   anterior pigtail catheter within the left upper lobe lobectomy bed. The   airways are extrinsic to the left lower lobe has slightly decreased   following placement of pigtail catheter.     Mediastinum: There are no enlarged chest lymph nodes. The visualized   portion of the thyroid gland is unremarkable.       Heart and Vasculature: The heart is normal in size.  There is no   pericardial effusion.   No coronary artery disease.    The main pulmonary artery is normal in caliber.  Left-sided aortic arch   and left-sided descending thoracic aorta. No aneurysm. Atheromatous   disease of the aorta.    Upper Abdomen: Punctate nonobstructing bilateral renal calculi.    Bones And Soft Tissues: Left seventh rib thoracotomy rib defect.  The   soft tissues are unremarkable.    IMPRESSION:    1.  Bilateral lobe tree-in-bud opacities with new patchy bilateral lobe   consolidations likely represent pneumonia.  2.  Small right and trace left pleural effusions.  3.  Interval placement of a left anterior approach pigtail catheter with   interval slight decrease in size of air within the left upper lobe   lobectomy bed.    < end of copied text >    CT Abdomen   Venous Dopplers: LE: < from: US Duplex Venous Lower Ext Complete, Bilateral (10.24.17 @ 11:42) >  IMPRESSION:     No evidence of bilateral lower extremity deep venous thrombosis.    < end of copied text >    Others  < from: CT Neck Soft Tissue w/ IV Cont (12.01.17 @ 08:09) >  IMPRESSION:    A small irregular vessel is noted adjacent to the left aspect of the   tracheostomy tube at the insertion point in the trachea; a small   pseudoaneurysm can't be excluded from a small branch of a thyroidal   artery.    < end of copied text >    < from: Transthoracic Echocardiogram (11.16.17 @ 13:25) >  ------------------------------------------------------------------------  CONCLUSIONS:  1. Technically very difficult/limited left ventricle views.  Grossly  at least moderate global left ventricular systolic  dysfunction.  2. The right ventricle is not well visualized appears  grossly enlarged with decreased function.  3. Estimated pulmonary artery systolic pressure equals 50  mm Hg, assuming right atrial pressure equals 10  mm Hg,  consistent with moderate pulmonary hypertension.  *** Compared with echocardiogram of 9/26/2017, LV function  has decreased.  -------------------------------------    < end of copied text >

## 2017-12-03 NOTE — PROGRESS NOTE ADULT - SUBJECTIVE AND OBJECTIVE BOX
CHIEF COMPLAINT: Hemoptysis    Interval Events: Dropped H/H overnight to 6.5 from baseline of 7-8; unclear why, had been having blood from trach site  previously but none past 2 days. Consented for and transfused 1U PRBC.     REVIEW OF SYSTEMS:  Constitutional: [x ] negative [ ] fevers [ ] chills [ ] weight loss [ ] weight gain  HEENT: [x ] negative [ ] dry eyes [ ] eye irritation [ ] postnasal drip [ ] nasal congestion  CV: [x ] negative  [ ] chest pain [ ] orthopnea [ ] palpitations [ ] murmur  Resp: [ ] negative [ ] cough [ x] shortness of breath [ ] dyspnea [ ] wheezing [ ] sputum [ ] hemoptysis  GI: [x ] negative [ ] nausea [ ] vomiting [ ] diarrhea [ ] constipation [ ] abd pain [ ] dysphagia   : [x ] negative [ ] dysuria [ ] nocturia [ ] hematuria [ ] increased urinary frequency  Musculoskeletal: [ x] negative [ ] back pain [ ] myalgias [ ] arthralgias [ ] fracture  Skin: [x ] negative [ ] rash [ ] itch  Neurological: [x ] negative [ ] headache [ ] dizziness [ ] syncope [ ] weakness [ ] numbness  Psychiatric: [ ] negative [ ] anxiety [ ] depression  Endocrine: [ ] negative [ ] diabetes [ ] thyroid problem  Hematologic/Lymphatic: [ ] negative [ ] anemia [ ] bleeding problem  Allergic/Immunologic: [ ] negative [ ] itchy eyes [ ] nasal discharge [ ] hives [ ] angioedema  [ ] All other systems negative  [ ] Unable to assess ROS because ________    OBJECTIVE:  ICU Vital Signs Last 24 Hrs  T(C): 36.4 (03 Dec 2017 04:00), Max: 37.7 (02 Dec 2017 08:00)  T(F): 97.6 (03 Dec 2017 04:00), Max: 99.9 (02 Dec 2017 08:00)  HR: 71 (03 Dec 2017 05:34) (63 - 109)  BP: 119/82 (03 Dec 2017 05:34) (84/52 - 137/83)  BP(mean): 90 (03 Dec 2017 05:34) (59 - 96)  ABP: --  ABP(mean): --  RR: 20 (03 Dec 2017 05:34) (12 - 26)  SpO2: 100% (03 Dec 2017 05:34) (95% - 100%)    Mode: AC/ CMV (Assist Control/ Continuous Mandatory Ventilation), RR (machine): 12, TV (machine): 500, FiO2: 40, PEEP: 8, MAP: 15, PIP: 36    12-01 @ 07:01  -  12-02 @ 07:00  --------------------------------------------------------  IN: 2896.5 mL / OUT: 1360 mL / NET: 1536.5 mL    12-02 @ 07:01 - 12-03 @ 06:38  --------------------------------------------------------  IN: 1616 mL / OUT: 730 mL / NET: 886 mL      CAPILLARY BLOOD GLUCOSE      POCT Blood Glucose.: 156 mg/dL (03 Dec 2017 05:25)      PHYSICAL EXAM:  General:   HEENT:   Lymph Nodes:  Neck:   Respiratory:   Cardiovascular:   Abdomen:   Extremities:   Skin:   Neurological:  Psychiatry:    LINES:    HOSPITAL MEDICATIONS:  Standing Meds:  ALBUTerol    90 MICROgram(s) HFA Inhaler 2 Puff(s) Inhalation every 6 hours  buDESOnide 160 MICROgram(s)/formoterol 4.5 MICROgram(s) Inhaler 2 Puff(s) Inhalation two times a day  chlorhexidine 4% Liquid 1 Application(s) Topical daily  dexmedetomidine Infusion 0.3 MICROgram(s)/kG/Hr IV Continuous <Continuous>  enoxaparin Injectable 40 milliGRAM(s) SubCutaneous daily  fentaNYL   Patch 100 MICROgram(s)/Hr. 1 Patch Transdermal every 48 hours  ferrous    sulfate Liquid 300 milliGRAM(s) Enteral Tube daily  folic acid 1 milliGRAM(s) Oral daily  insulin lispro (HumaLOG) corrective regimen sliding scale   SubCutaneous every 6 hours  ipratropium 17 MICROgram(s) HFA Inhaler 1 Puff(s) Inhalation every 6 hours  meropenem IVPB      meropenem IVPB 1000 milliGRAM(s) IV Intermittent every 8 hours  norepinephrine Infusion 0.01 MICROgram(s)/kG/Min IV Continuous <Continuous>  polyethylene glycol 3350 17 Gram(s) Oral at bedtime  predniSONE   Tablet 20 milliGRAM(s) Oral daily  senna Syrup 10 milliLiter(s) Oral at bedtime  valproic  acid Syrup 500 milliGRAM(s) Oral two times a day  vancomycin  IVPB 1000 milliGRAM(s) IV Intermittent every 8 hours  voriconazole IVPB 300 milliGRAM(s) IV Intermittent every 12 hours      PRN Meds:  acetaminophen   Tablet 650 milliGRAM(s) Oral every 6 hours PRN      LABS:                        6.4    8.46  )-----------( 151      ( 03 Dec 2017 02:30 )             20.6     Hgb Trend: 6.4<--, 7.5<--, 7.4<--, 8.3<--, 7.8<--  12-03    143  |  96<L>  |  15  ----------------------------<  139<H>  3.6   |  37<H>  |  0.59    Ca    8.5      03 Dec 2017 02:30  Phos  3.2     12-03  Mg     2.1     12-03    TPro  5.5<L>  /  Alb  2.3<L>  /  TBili  0.2  /  DBili  x   /  AST  16  /  ALT  11  /  AlkPhos  60  12-03    Creatinine Trend: 0.59<--, 0.79<--, 0.74<--, 0.70<--, 0.57<--, 0.73<--      MICROBIOLOGY:     Culture - Blood (collected 01 Dec 2017 06:18)  Source: BLOOD VENOUS  Preliminary Report (03 Dec 2017 06:18):    NO ORGANISMS ISOLATED    NO ORGANISMS ISOLATED AT 48 HRS.    Culture - Blood (collected 01 Dec 2017 06:18)  Source: BLOOD PERIPHERAL  Preliminary Report (03 Dec 2017 06:18):    NO ORGANISMS ISOLATED    NO ORGANISMS ISOLATED AT 48 HRS.      RADIOLOGY:  [ ] Reviewed and interpreted by me    EKG: CHIEF COMPLAINT: Hemoptysis    Interval Events: Dropped H/H overnight to 6.5 from baseline of 7-8; unclear why, had been having blood from trach site  previously but none past 2 days. Consented for and transfused 1U PRBC.     REVIEW OF SYSTEMS:  Constitutional: [x ] negative [ ] fevers [ ] chills [ ] weight loss [ ] weight gain  HEENT: [x ] negative [ ] dry eyes [ ] eye irritation [ ] postnasal drip [ ] nasal congestion  CV: [x ] negative  [ ] chest pain [ ] orthopnea [ ] palpitations [ ] murmur  Resp: [ ] negative [ ] cough [ x] shortness of breath [ ] dyspnea [ ] wheezing [ ] sputum [ ] hemoptysis  GI: [x ] negative [ ] nausea [ ] vomiting [ ] diarrhea [ ] constipation [ ] abd pain [ ] dysphagia   : [x ] negative [ ] dysuria [ ] nocturia [ ] hematuria [ ] increased urinary frequency  Musculoskeletal: [ x] negative [ ] back pain [ ] myalgias [ ] arthralgias [ ] fracture  Skin: [x ] negative [ ] rash [ ] itch  Neurological: [x ] negative [ ] headache [ ] dizziness [ ] syncope [ ] weakness [ ] numbness  Psychiatric: [ ] negative [ ] anxiety [ ] depression  Endocrine: [ ] negative [ ] diabetes [ ] thyroid problem  Hematologic/Lymphatic: [ ] negative [ ] anemia [ ] bleeding problem  Allergic/Immunologic: [ ] negative [ ] itchy eyes [ ] nasal discharge [ ] hives [ ] angioedema  [ ] All other systems negative  [ ] Unable to assess ROS because ________    OBJECTIVE:  ICU Vital Signs Last 24 Hrs  T(C): 36.4 (03 Dec 2017 04:00), Max: 37.7 (02 Dec 2017 08:00)  T(F): 97.6 (03 Dec 2017 04:00), Max: 99.9 (02 Dec 2017 08:00)  HR: 71 (03 Dec 2017 05:34) (63 - 109)  BP: 119/82 (03 Dec 2017 05:34) (84/52 - 137/83)  BP(mean): 90 (03 Dec 2017 05:34) (59 - 96)  ABP: --  ABP(mean): --  RR: 20 (03 Dec 2017 05:34) (12 - 26)  SpO2: 100% (03 Dec 2017 05:34) (95% - 100%)    Mode: AC/ CMV (Assist Control/ Continuous Mandatory Ventilation), RR (machine): 12, TV (machine): 500, FiO2: 40, PEEP: 8, MAP: 15, PIP: 36    12-01 @ 07:01  -  12-02 @ 07:00  --------------------------------------------------------  IN: 2896.5 mL / OUT: 1360 mL / NET: 1536.5 mL    12-02 @ 07:01 - 12-03 @ 06:38  --------------------------------------------------------  IN: 1616 mL / OUT: 730 mL / NET: 886 mL      CAPILLARY BLOOD GLUCOSE      POCT Blood Glucose.: 156 mg/dL (03 Dec 2017 05:25)      PHYSICAL EXAM:  General: alert, follows commands  HEENT: trach in place  Lymph Nodes: no adenopathy  Neck: trach in place, no evidence of bleeding around trach  Respiratory: b/l rhonchi, improved from previous  Cardiovascular: RRR no MRG  Abdomen: soft, NTND  Extremities: warm, well perfused  Skin: intact no rash  Neurological: alert, able to answer questions and follow commands  Psychiatry: normal, improved anxiety on precedex    LINES: PIV, trach, KO tube    HOSPITAL MEDICATIONS:  Standing Meds:  ALBUTerol    90 MICROgram(s) HFA Inhaler 2 Puff(s) Inhalation every 6 hours  buDESOnide 160 MICROgram(s)/formoterol 4.5 MICROgram(s) Inhaler 2 Puff(s) Inhalation two times a day  chlorhexidine 4% Liquid 1 Application(s) Topical daily  dexmedetomidine Infusion 0.3 MICROgram(s)/kG/Hr IV Continuous <Continuous>  enoxaparin Injectable 40 milliGRAM(s) SubCutaneous daily  fentaNYL   Patch 100 MICROgram(s)/Hr. 1 Patch Transdermal every 48 hours  ferrous    sulfate Liquid 300 milliGRAM(s) Enteral Tube daily  folic acid 1 milliGRAM(s) Oral daily  insulin lispro (HumaLOG) corrective regimen sliding scale   SubCutaneous every 6 hours  ipratropium 17 MICROgram(s) HFA Inhaler 1 Puff(s) Inhalation every 6 hours  meropenem IVPB      meropenem IVPB 1000 milliGRAM(s) IV Intermittent every 8 hours  norepinephrine Infusion 0.01 MICROgram(s)/kG/Min IV Continuous <Continuous>  polyethylene glycol 3350 17 Gram(s) Oral at bedtime  predniSONE   Tablet 20 milliGRAM(s) Oral daily  senna Syrup 10 milliLiter(s) Oral at bedtime  valproic  acid Syrup 500 milliGRAM(s) Oral two times a day  vancomycin  IVPB 1000 milliGRAM(s) IV Intermittent every 8 hours  voriconazole IVPB 300 milliGRAM(s) IV Intermittent every 12 hours      PRN Meds:  acetaminophen   Tablet 650 milliGRAM(s) Oral every 6 hours PRN      LABS:                        6.4    8.46  )-----------( 151      ( 03 Dec 2017 02:30 )             20.6     Hgb Trend: 6.4<--, 7.5<--, 7.4<--, 8.3<--, 7.8<--  12-03    143  |  96<L>  |  15  ----------------------------<  139<H>  3.6   |  37<H>  |  0.59    Ca    8.5      03 Dec 2017 02:30  Phos  3.2     12-03  Mg     2.1     12-03    TPro  5.5<L>  /  Alb  2.3<L>  /  TBili  0.2  /  DBili  x   /  AST  16  /  ALT  11  /  AlkPhos  60  12-03    Creatinine Trend: 0.59<--, 0.79<--, 0.74<--, 0.70<--, 0.57<--, 0.73<--      MICROBIOLOGY:     Culture - Blood (collected 01 Dec 2017 06:18)  Source: BLOOD VENOUS  Preliminary Report (03 Dec 2017 06:18):    NO ORGANISMS ISOLATED    NO ORGANISMS ISOLATED AT 48 HRS.    Culture - Blood (collected 01 Dec 2017 06:18)  Source: BLOOD PERIPHERAL  Preliminary Report (03 Dec 2017 06:18):    NO ORGANISMS ISOLATED    NO ORGANISMS ISOLATED AT 48 HRS.      RADIOLOGY:  [ ] Reviewed and interpreted by me    EKG:

## 2017-12-03 NOTE — PROGRESS NOTE ADULT - PROBLEM SELECTOR PLAN 6
controlled.  11/10 stable  11/11 stable  11/12 stable  11/18 off meds. not on pressor  11/19 stable without pressors  11/21: controlled  11/30: off an on pressors: currently off  12/2 on small dose of pressor. could be 2/2 propofol  12/3 on pressor

## 2017-12-03 NOTE — PROGRESS NOTE ADULT - PROBLEM SELECTOR PLAN 3
? 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 ot from bronchiectasis? eliquis have been dced: needs card to se: In addition, he may need IR help with embolization, it his hemoptysis increases: He has had pleurodesis on the left side.  11/10 quantify hemoptysis. no AC. stable  11/11 no more hemoptysis. off AC  11/12 resolved. off AC  11/13: today had hemoptysis again----->localize site---> IR embolization: left a message for thoracic team  11/14: DW Dr Caldera: for repeat bronchoscopy to localize the site of bleeding and need IR help to embolize the culprit vessel!  11/15: started on broad spectrum antibiotics and for bronchoscopy today  11/16: s/p IR emobolization done: monitor for more hemoptysis  11/17: s/p bronchsocopy: reportedly old blood seen in airways!  11/18 s/p bronchoscopy in ICU via ETT. old clot. no active bleeding  11/19 stable. no active bleeding per recent bronchoscopy  11/20; off AC at this time  11/22: resolved: remained off ac  11/23: remained off AC: has hx of PAF  11/24: remains off AC  11/27: no bleeding noted  11/30: no recent  hemoptysis  12/01:so far no more hemoptysis  12/2 resolved  12/3 resolved

## 2017-12-03 NOTE — PROGRESS NOTE ADULT - PROBLEM SELECTOR PLAN 5
on prednisone.  11/10 continue steroid  11/11 on steroid  11/12 on oral steroid  11/13: cont oral steroids!!  11/14: Has pretty poor lungs secondary to steroids: Cont current therapy!!  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  11/27: cont on steroids  11/30: om 30 mg of prednisone  12/01:on 20 mg of prednisone now !!  12/02 Prednisone  12/3 on Prednisone

## 2017-12-03 NOTE — PROGRESS NOTE ADULT - ATTENDING COMMENTS
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 Bleeding has stabilized; has not required transfusion. Daily weaning trials. Chest tube has been clamped for about 24 hours with increasing peak airway pressure but no hemodynamic instability, but un-clamping of chest tube led to air leak and improvement in peak pressures, which is concerning for a slow BPF. GI to evaluate for PEG. Stop abx, continue with voriconazole. DVT ppx.     45 minutes critical time spent.

## 2017-12-03 NOTE — PROGRESS NOTE ADULT - PROBLEM SELECTOR PLAN 2
intubated: try to titrate as tolerated!  11/18 on ventilator support. Pt is sedated and paralyzed. defer ventilator Management/ weaning per CTICU team.  11/19 still sedated and paralyzed. Plan to wean off Nimbex today per Primary RN. ABGs reviewed.  11/20:ff paralyzing agent! But sedated !!  11/21: awake and alert: responding to simple commands: weaning trials  11/22: currently sedated: ? trach now?  11/23: for trach in AM  11/24: trach today  11/25: S/P trach yesterday: chest x-ray noted: Cont full vent support and wean if tolerates  11/26: [id number] with trach and full mechanical ventilation: Discussed with MICU attending in detail: for rpt ct scan chest  11/28: rpt ct scan c hst reviewed: for possible chest tube on rt side  11/30: on full vent support with weaning trials  12/01:cont weaning trails as tolerated; prednisone dose lowered!!  12/2 on CPAP trial. Continue wean off as tolerate  12/3 CPAP as tolerate intubated: try to titrate as tolerated!  11/18 on ventilator support. Pt is sedated and paralyzed. defer ventilator Management/ weaning per CTICU team.  11/19 still sedated and paralyzed. Plan to wean off Nimbex today per Primary RN. ABGs reviewed.  11/20:ff paralyzing agent! But sedated !!  11/21: awake and alert: responding to simple commands: weaning trials  11/22: currently sedated: ? trach now?  11/23: for trach in AM  11/24: trach today  11/25: S/P trach yesterday: chest x-ray noted: Cont full vent support and wean if tolerates  11/26: [id number] with trach and full mechanical ventilation: Discussed with MICU attending in detail: for rpt ct scan chest  11/28: rpt ct scan c hst reviewed: for possible chest tube on rt side  11/30: on full vent support with weaning trials  12/01:cont weaning trails as tolerated; prednisone dose lowered!!  12/2 on CPAP trial. Continue wean off as tolerate  12/3 CPAP as tolerated

## 2017-12-03 NOTE — PROGRESS NOTE ADULT - PROBLEM SELECTOR PLAN 1
now spiked to fever:  on zosyn, vancomycin, as well as voriconazole~  id FOLLOW UP  11/27: Cont antibiotics::  11/28: cont current treatment  11/30: continues to be on broad spectrum antibiotics:  12/01: antibiotics upgraded to meropenem :still febrile:  12/2 on Meropenem, Vanco, and Voriconazole T(F): 98.8 (02 Dec 2017 12:00), Max: 100.3 (02 Dec 2017 00:00)  12/3 afebrile for last 24 hours T(C): 37.1 (03 Dec 2017 12:00), Max: 37.1 (03 Dec 2017 12:00) on ABTs

## 2017-12-03 NOTE — PROGRESS NOTE ADULT - PROBLEM SELECTOR PLAN 7
s/p pleurodesis.  11/10 s/p pleurodesis. Management defer to CTS  11/11 no intervention for now. per CTS.  11/13: has mod pneumothorax stable: defer to thoracic for any intervention!  11/12 no intervention planned. bronchoscopy in future per CTS  11/14: Pt still has left sided pneumothorax: s/p blood patch as well as the talc pleurodesis on the left side: Hard to do any other intervention : would defer to thoracic surgical team!!  11/18 management per CTS. not plan for Chest tube at this moment.  11/19 stable radiographically and clinically. Management defer to CTS  11/22: Has this air space in the left lower lobe following DAVID lobectomy: ? PTX, empty space ? any intervention would be difficult: would defer to ct surgery!!  11/27/2017: for rpt ct scan chest now  1130: s/p chest tube placement on left side: yesterdays x-ray no full lung reexpansion:  12/01/2017: with only slight reexpansion of the ptx on left side after a small chest tube!!  12/2 radiographically improved. Chest Tube (+)  12/3 stable. Chest tube (+)

## 2017-12-03 NOTE — PROGRESS NOTE ADULT - ATTENDING COMMENTS
12/03: His lungs failed to re expand following pig tail to left upper airspace/ptx: He does have airleak: Likely has BPF: : He failed weaning trials too: Cont current supportive care as well as daily Weaning trials!!

## 2017-12-04 LAB
APTT BLD: 27 SEC — LOW (ref 27.5–37.4)
BACTERIA BLD CULT: SIGNIFICANT CHANGE UP
BACTERIA BLD CULT: SIGNIFICANT CHANGE UP
BASOPHILS # BLD AUTO: 0.01 K/UL — SIGNIFICANT CHANGE UP (ref 0–0.2)
BASOPHILS NFR BLD AUTO: 0.1 % — SIGNIFICANT CHANGE UP (ref 0–2)
BUN SERPL-MCNC: 14 MG/DL — SIGNIFICANT CHANGE UP (ref 7–23)
CALCIUM SERPL-MCNC: 8.3 MG/DL — LOW (ref 8.4–10.5)
CHLORIDE SERPL-SCNC: 93 MMOL/L — LOW (ref 98–107)
CO2 SERPL-SCNC: 39 MMOL/L — HIGH (ref 22–31)
CREAT SERPL-MCNC: 0.61 MG/DL — SIGNIFICANT CHANGE UP (ref 0.5–1.3)
EOSINOPHIL # BLD AUTO: 0.06 K/UL — SIGNIFICANT CHANGE UP (ref 0–0.5)
EOSINOPHIL NFR BLD AUTO: 0.7 % — SIGNIFICANT CHANGE UP (ref 0–6)
FERRITIN SERPL-MCNC: 421.6 NG/ML — HIGH (ref 30–400)
GLUCOSE BLDC GLUCOMTR-MCNC: 101 MG/DL — HIGH (ref 70–99)
GLUCOSE BLDC GLUCOMTR-MCNC: 120 MG/DL — HIGH (ref 70–99)
GLUCOSE BLDC GLUCOMTR-MCNC: 138 MG/DL — HIGH (ref 70–99)
GLUCOSE SERPL-MCNC: 110 MG/DL — HIGH (ref 70–99)
HAPTOGLOB SERPL-MCNC: 365 MG/DL — HIGH (ref 34–200)
HCT VFR BLD CALC: 23.7 % — LOW (ref 39–50)
HGB BLD-MCNC: 7.6 G/DL — LOW (ref 13–17)
IMM GRANULOCYTES # BLD AUTO: 0.06 # — SIGNIFICANT CHANGE UP
IMM GRANULOCYTES NFR BLD AUTO: 0.7 % — SIGNIFICANT CHANGE UP (ref 0–1.5)
INR BLD: 1.24 — HIGH (ref 0.88–1.17)
IRON SATN MFR SERPL: 145 UG/DL — LOW (ref 155–535)
IRON SATN MFR SERPL: 32 UG/DL — LOW (ref 45–165)
LYMPHOCYTES # BLD AUTO: 1.01 K/UL — SIGNIFICANT CHANGE UP (ref 1–3.3)
LYMPHOCYTES # BLD AUTO: 12.4 % — LOW (ref 13–44)
MAGNESIUM SERPL-MCNC: 2.1 MG/DL — SIGNIFICANT CHANGE UP (ref 1.6–2.6)
MCHC RBC-ENTMCNC: 30.6 PG — SIGNIFICANT CHANGE UP (ref 27–34)
MCHC RBC-ENTMCNC: 32.1 % — SIGNIFICANT CHANGE UP (ref 32–36)
MCV RBC AUTO: 95.6 FL — SIGNIFICANT CHANGE UP (ref 80–100)
MONOCYTES # BLD AUTO: 1.01 K/UL — HIGH (ref 0–0.9)
MONOCYTES NFR BLD AUTO: 12.4 % — SIGNIFICANT CHANGE UP (ref 2–14)
NEUTROPHILS # BLD AUTO: 6.01 K/UL — SIGNIFICANT CHANGE UP (ref 1.8–7.4)
NEUTROPHILS NFR BLD AUTO: 73.7 % — SIGNIFICANT CHANGE UP (ref 43–77)
NRBC # FLD: 0 — SIGNIFICANT CHANGE UP
PHOSPHATE SERPL-MCNC: 3.5 MG/DL — SIGNIFICANT CHANGE UP (ref 2.5–4.5)
PLATELET # BLD AUTO: 170 K/UL — SIGNIFICANT CHANGE UP (ref 150–400)
PMV BLD: 13.1 FL — HIGH (ref 7–13)
POTASSIUM SERPL-MCNC: 3.7 MMOL/L — SIGNIFICANT CHANGE UP (ref 3.5–5.3)
POTASSIUM SERPL-SCNC: 3.7 MMOL/L — SIGNIFICANT CHANGE UP (ref 3.5–5.3)
PREALB SERPL-MCNC: 16 MG/DL — LOW (ref 20–40)
PROTHROM AB SERPL-ACNC: 13.9 SEC — HIGH (ref 9.8–13.1)
RBC # BLD: 2.48 M/UL — LOW (ref 4.2–5.8)
RBC # FLD: 16.9 % — HIGH (ref 10.3–14.5)
SODIUM SERPL-SCNC: 140 MMOL/L — SIGNIFICANT CHANGE UP (ref 135–145)
UIBC SERPL-MCNC: 113 UG/DL — SIGNIFICANT CHANGE UP (ref 110–370)
WBC # BLD: 8.16 K/UL — SIGNIFICANT CHANGE UP (ref 3.8–10.5)
WBC # FLD AUTO: 8.16 K/UL — SIGNIFICANT CHANGE UP (ref 3.8–10.5)

## 2017-12-04 PROCEDURE — 99222 1ST HOSP IP/OBS MODERATE 55: CPT | Mod: GC

## 2017-12-04 PROCEDURE — 71010: CPT | Mod: 26,76

## 2017-12-04 PROCEDURE — 71250 CT THORAX DX C-: CPT | Mod: 26

## 2017-12-04 PROCEDURE — 31624 DX BRONCHOSCOPE/LAVAGE: CPT

## 2017-12-04 PROCEDURE — 99232 SBSQ HOSP IP/OBS MODERATE 35: CPT

## 2017-12-04 PROCEDURE — 93970 EXTREMITY STUDY: CPT | Mod: 26

## 2017-12-04 PROCEDURE — 99291 CRITICAL CARE FIRST HOUR: CPT

## 2017-12-04 RX ORDER — MIDAZOLAM HYDROCHLORIDE 1 MG/ML
4 INJECTION, SOLUTION INTRAMUSCULAR; INTRAVENOUS ONCE
Qty: 0 | Refills: 0 | Status: DISCONTINUED | OUTPATIENT
Start: 2017-12-04 | End: 2017-12-04

## 2017-12-04 RX ORDER — NOREPINEPHRINE BITARTRATE/D5W 8 MG/250ML
0.05 PLASTIC BAG, INJECTION (ML) INTRAVENOUS
Qty: 8 | Refills: 0 | Status: DISCONTINUED | OUTPATIENT
Start: 2017-12-04 | End: 2017-12-06

## 2017-12-04 RX ORDER — FENTANYL CITRATE 50 UG/ML
100 INJECTION INTRAVENOUS ONCE
Qty: 0 | Refills: 0 | Status: DISCONTINUED | OUTPATIENT
Start: 2017-12-04 | End: 2017-12-04

## 2017-12-04 RX ORDER — CHLORHEXIDINE GLUCONATE 213 G/1000ML
15 SOLUTION TOPICAL
Qty: 0 | Refills: 0 | Status: DISCONTINUED | OUTPATIENT
Start: 2017-12-04 | End: 2017-12-09

## 2017-12-04 RX ORDER — MIDAZOLAM HYDROCHLORIDE 1 MG/ML
2 INJECTION, SOLUTION INTRAMUSCULAR; INTRAVENOUS ONCE
Qty: 0 | Refills: 0 | Status: DISCONTINUED | OUTPATIENT
Start: 2017-12-04 | End: 2017-12-04

## 2017-12-04 RX ORDER — FUROSEMIDE 40 MG
40 TABLET ORAL ONCE
Qty: 0 | Refills: 0 | Status: COMPLETED | OUTPATIENT
Start: 2017-12-04 | End: 2017-12-04

## 2017-12-04 RX ORDER — DIAZEPAM 5 MG
2 TABLET ORAL
Qty: 0 | Refills: 0 | Status: DISCONTINUED | OUTPATIENT
Start: 2017-12-04 | End: 2017-12-06

## 2017-12-04 RX ORDER — PROPOFOL 10 MG/ML
10 INJECTION, EMULSION INTRAVENOUS
Qty: 1000 | Refills: 0 | Status: DISCONTINUED | OUTPATIENT
Start: 2017-12-04 | End: 2017-12-06

## 2017-12-04 RX ADMIN — Medication 2 MILLIGRAM(S): at 17:30

## 2017-12-04 RX ADMIN — ALBUTEROL 2 PUFF(S): 90 AEROSOL, METERED ORAL at 22:08

## 2017-12-04 RX ADMIN — MEROPENEM 100 MILLIGRAM(S): 1 INJECTION INTRAVENOUS at 06:14

## 2017-12-04 RX ADMIN — VORICONAZOLE 125 MILLIGRAM(S): 10 INJECTION, POWDER, LYOPHILIZED, FOR SOLUTION INTRAVENOUS at 12:17

## 2017-12-04 RX ADMIN — FENTANYL CITRATE 100 MICROGRAM(S): 50 INJECTION INTRAVENOUS at 04:37

## 2017-12-04 RX ADMIN — ENOXAPARIN SODIUM 40 MILLIGRAM(S): 100 INJECTION SUBCUTANEOUS at 12:17

## 2017-12-04 RX ADMIN — BUDESONIDE AND FORMOTEROL FUMARATE DIHYDRATE 2 PUFF(S): 160; 4.5 AEROSOL RESPIRATORY (INHALATION) at 09:12

## 2017-12-04 RX ADMIN — CHLORHEXIDINE GLUCONATE 15 MILLILITER(S): 213 SOLUTION TOPICAL at 17:31

## 2017-12-04 RX ADMIN — Medication 10 MILLIGRAM(S): at 06:14

## 2017-12-04 RX ADMIN — VORICONAZOLE 125 MILLIGRAM(S): 10 INJECTION, POWDER, LYOPHILIZED, FOR SOLUTION INTRAVENOUS at 23:00

## 2017-12-04 RX ADMIN — FENTANYL CITRATE 100 MICROGRAM(S): 50 INJECTION INTRAVENOUS at 04:47

## 2017-12-04 RX ADMIN — Medication 500 MILLIGRAM(S): at 17:31

## 2017-12-04 RX ADMIN — Medication 500 MILLIGRAM(S): at 06:18

## 2017-12-04 RX ADMIN — Medication 1 MILLIGRAM(S): at 12:17

## 2017-12-04 RX ADMIN — ALBUTEROL 2 PUFF(S): 90 AEROSOL, METERED ORAL at 03:41

## 2017-12-04 RX ADMIN — Medication 1 PUFF(S): at 22:08

## 2017-12-04 RX ADMIN — MIDAZOLAM HYDROCHLORIDE 2 MILLIGRAM(S): 1 INJECTION, SOLUTION INTRAMUSCULAR; INTRAVENOUS at 22:40

## 2017-12-04 RX ADMIN — Medication 250 MILLIGRAM(S): at 06:14

## 2017-12-04 RX ADMIN — Medication 1 PUFF(S): at 03:41

## 2017-12-04 RX ADMIN — ALBUTEROL 2 PUFF(S): 90 AEROSOL, METERED ORAL at 09:12

## 2017-12-04 RX ADMIN — ALBUTEROL 2 PUFF(S): 90 AEROSOL, METERED ORAL at 16:10

## 2017-12-04 RX ADMIN — BUDESONIDE AND FORMOTEROL FUMARATE DIHYDRATE 2 PUFF(S): 160; 4.5 AEROSOL RESPIRATORY (INHALATION) at 22:08

## 2017-12-04 RX ADMIN — FENTANYL CITRATE 100 MICROGRAM(S): 50 INJECTION INTRAVENOUS at 06:13

## 2017-12-04 RX ADMIN — Medication 1 PUFF(S): at 16:10

## 2017-12-04 RX ADMIN — MIDAZOLAM HYDROCHLORIDE 4 MILLIGRAM(S): 1 INJECTION, SOLUTION INTRAMUSCULAR; INTRAVENOUS at 21:00

## 2017-12-04 RX ADMIN — CHLORHEXIDINE GLUCONATE 1 APPLICATION(S): 213 SOLUTION TOPICAL at 12:30

## 2017-12-04 RX ADMIN — MIDAZOLAM HYDROCHLORIDE 4 MILLIGRAM(S): 1 INJECTION, SOLUTION INTRAMUSCULAR; INTRAVENOUS at 06:13

## 2017-12-04 RX ADMIN — Medication 300 MILLIGRAM(S): at 12:17

## 2017-12-04 RX ADMIN — Medication 1 PUFF(S): at 09:12

## 2017-12-04 RX ADMIN — MIDAZOLAM HYDROCHLORIDE 2 MILLIGRAM(S): 1 INJECTION, SOLUTION INTRAMUSCULAR; INTRAVENOUS at 01:51

## 2017-12-04 RX ADMIN — Medication 40 MILLIGRAM(S): at 12:18

## 2017-12-04 NOTE — PROGRESS NOTE ADULT - PROBLEM SELECTOR PLAN 7
s/p pleurodesis.  11/10 s/p pleurodesis. Management defer to CTS  11/11 no intervention for now. per CTS.  11/13: has mod pneumothorax stable: defer to thoracic for any intervention!  11/12 no intervention planned. bronchoscopy in future per CTS  11/14: Pt still has left sided pneumothorax: s/p blood patch as well as the talc pleurodesis on the left side: Hard to do any other intervention : would defer to thoracic surgical team!!  11/18 management per CTS. not plan for Chest tube at this moment.  11/19 stable radiographically and clinically. Management defer to CTS  11/22: Has this air space in the left lower lobe following DAVID lobectomy: ? PTX, empty space ? any intervention would be difficult: would defer to ct surgery!!  11/27/2017: for rpt ct scan chest now  1130: s/p chest tube placement on left side: yesterdays x-ray no full lung reexpansion:  12/01/2017: with only slight reexpansion of the ptx on left side after a small chest tube!!  12/2 radiographically improved. Chest Tube (+)  12/3 stable. Chest tube (+)  12/04: has pesitent ptx on the left side: has BPF too

## 2017-12-04 NOTE — PROCEDURE NOTE - ADDITIONAL PROCEDURE DETAILS
Indication: High airway pressures  History: history of sarcoidosis, noted to have high peak pressures with possible mucous plug  Preop medication: Versed and propofol  Findings:  Bronchoscope inserted through tracheostomy. Tracheostomy noted to be in good position. Airway evaluation revealed Sharp Hien. DAVID stump observed and LLL evaluation revealed noted to have thick mucous secretions.  Saline wash performed, with thick secretions.  RUL, RML, RLL revealed minimal secretions. Bronchoscope then withdrawn from ETT. Minimal bleeding noted    Specimens: Not sent    Jng PGY 6
during IR emboliation(which was reported to be successful), pt developed massive hemoptysis filling up ET Tube and causing pt to desat, perhaps the bronchial blocker may have dislodged. Pt was suctioned in IR, improved.   Bedside bronchoscopy revealed no obvious active bleeding, old blood was suctioned out of both right and left airways. BAL w/ iced saline w/ epi
old blood in mel, cleared, no active bleeding identified

## 2017-12-04 NOTE — PROGRESS NOTE ADULT - ASSESSMENT
47 year old with sarcoidosis and COPD admitted after recent andree surgery with recurrent hemoptysis.     This is liklely multifactorial- Cystic lung changes, sarcoid, ? superimposed infection many all be contributing.     Now, s/p trach in ICU. Recent staph epi bacteremia.     Continue voriconazole -through 12/28. Check weekly cmp    Okay to stop vanco/ meropenem    Will sign off.    Call with questions

## 2017-12-04 NOTE — PROCEDURE NOTE - PROCEDURE DATE TIME, MLM
16-Nov-2017 15:30
04-Dec-2017 22:45
18-Nov-2017 12:30
20-Nov-2017 15:25
22-Nov-2017 08:30
17-Nov-2017
28-Nov-2017 16:00

## 2017-12-04 NOTE — PROGRESS NOTE ADULT - PROBLEM SELECTOR PLAN 4
Cont BD with inhaled steroids and oral steroids  11/114: Pts wheezing has significantly improved: he will need long term steroids as he starts wheezing once his steroids are dced!  11/15: Wheezing has increased: started on IV steroids high dose by thoracic team: Would suggest to decrease to 20 mg three times a day !  11/16: decrease steroids today to 20 mg three times day  11/17: try to decrease steroids in AM  11/18 Duoneb. intubated for acute respiratory distress with hypoxia  11/19 KIARA. intubated and on ventilator support  11/20: cont vent support: start weaning as tolerated!  11/21: would decrease  his steroids to 30 mg a day from tomorrow  11/22: DECREASE STEROIDS: HE WHEEZES WHEN THE STEROIDS ARE TAPERED: BUT NEED TO BE LOWERED NOW AND WATCH!!  11/23: on 40 mg of prednisone: try to decrease and taper  11/24: prednisone decreased to 30 ,g a day !!  11/25: on 30 mg a day now:  11/27: on steroids: for repeat ct scan chest today  11/28: ct reviewed with MICU attending  11/30: on bronchodilators and steroids: weaning trials  12/01:cont weaning trials as tolerated : new ct noted:not much reexpansion of ptx!!  12/2 on bronchodilators and steroid. Wean as per MICU  12/3 continue current treatment  12/04: cont BD and steroids

## 2017-12-04 NOTE — CONSULT NOTE ADULT - ASSESSMENT
Impression:   - Dysphagia     Plan:   - please check CBC, CMP, INR  - keep NPO after midnight  - plan for peg placement at bedside tomorrow  - please discuss with family re: consent for the procedure  - supportive care as per primary team    GI team will continue to follow.  Thank you for the consult. Impression:   - Dysphagia 2/2 reduced levels of consciousness or cognition  - Chronic resp failure 2/2 tracheostomy tube placement on 11/24  - Aspergilloma dx 9/24/17, on voriconazole, ID following     Plan:   - please check CBC, CMP, INR  - keep NPO after midnight  - plan for peg placement at bedside tomorrow  - please discuss with family re: consent for the procedure  - supportive care as per primary team    GI team will continue to follow.  Thank you for the consult. Impression:   - Dysphagia 2/2 reduced levels of consciousness or cognition  - Chronic resp failure 2/2 tracheostomy tube placement on 11/24  - Aspergilloma dx 9/24/17, on voriconazole, ID following     Plan:   - please check CBC, CMP, INR  - keep NPO after midnight  - plan for peg placement at bedside tomorrow if medically optimized  - supportive care as per primary team    GI team will continue to follow.  Thank you for the consult.

## 2017-12-04 NOTE — PROGRESS NOTE ADULT - ASSESSMENT
47M with hx of sarcoidosis, cerebrovascular infarct with PFO on echo and paroxysmal afib in September started on eliquis c/b recurrent hemoptysis (Eliquis d/c'd on 9/20, then restarted between 10/23-27 after hemoptysis improved), d/c'd to rehab then returned 11/9 for recurrent hemoptysis requiring intubation and CTICU admission for management of hemoptysis including IR embolization (11/16) and serial bronch (11/17 and 11/20) in CTICU with no active bleeding visualized, now remains intubated with difficulty weaning vent as during CPAP trials pt fails to initiate breaths, unclear etiology. Transferred to MICU from CTICU for continued vent management and attempted weaning. Bedside trach placed 11/24 c/b episode of hypoxia and bleeding around trach site after suctioning, resolved after surgicel and pressure. Has been febrile intermittently, though improving, grew Klebsiella on urine cx, blood cx with staph epi. Now with recurrent bloody secretions from trach s/p rpt bronch 11/30, spiked another temp 12/1/17 (102.7) and repeat cultures drawn. Also with DAVID collapse with concern for pneumothorax vs broncho-pleural fistula.     # Neuro:  -On precedex and fentanyl patch, improvement in anxiety with these medications  -Keep as alert as possible    #Cardiovascular:  -has been intermittently on pressors, currently on small amount levo to maintain MAP > 65.    #Respiratory:  -Trach placed 11/24  -L upper anterior pigtail fell out yest, was going to be DC'd anyway given improvement and so was not replaced.   -Continue bronchodilators and pulmonary toilet  -prednisone decrease to 10mg today  -Did well on CPAP yesterday, cont aggressive weaning  -Rpt CXR this morning as pigtail clamped 12/2  -Bloody secretions from trach initially, now improving  -CT neck from 12/1 with ?vessel irregularity on left adjacent to trach; small branch off thyroid artery (?pseudoaneruysm)     #GI:  -on tube feeds, will need evaluation for PEG (d/w GI)    #Renal:  -chronically elevated bicarb likely 2/2 chronic hypercarbia in setting of hypoventilation  -Monitor I's and O's    #Heme:  -Yest pt had a drop in hemoglobin from baseline 7-8 to 6, unclear etiology, given 1u pRBC   -hgb remains stable since.     #ID:  -Aspergilloma dx 9/24/17, on voriconazole x 6 weeks per ID recommendations.   -Recommend continuing voriconazole given hemoptysis recurred after was stopped, possible related to inflammation 2/2 aspergilloma and possible continued infection.  -last (+) blood cx w/ GPC on 11/26 now (-)   -Afebrile and improved, recent cultures all negative, d/c abx today    #Endocrine:   -On tube feeds, increased per dietary's recommendations FS q6h, ISS,    #DVT PPX: lovenox

## 2017-12-04 NOTE — CONSULT NOTE ADULT - SUBJECTIVE AND OBJECTIVE BOX
Chief Complaint:  Patient is a 47y old  Male who presents with a chief complaint of hemoptysis (09 Nov 2017 00:32)      HPI:  47 year old male with sarcoidosis, cerebrovascular infarct with PFO on echo and paroxysmal afib in September started on eliquis c/b recurrent hemoptysis (Eliquis d/c'd on 9/20, then restarted between 10/23-27 after hemoptysis improved), d/c'd to rehab then returned 11/9 for recurrent hemoptysis requiring intubation and CTICU admission for management of hemoptysis including IR embolization (11/16) and serial bronch (11/17 and 11/20) in CTICU with no active bleeding visualized, now remains intubated with difficulty weaning vent as during CPAP trials pt fails to initiate breaths, unclear etiology. Transferred to MICU from CTICU for continued vent management and attempted weaning. Bedside trach placed 11/24 c/b episode of hypoxia and bleeding around trach site after suctioning, resolved after surgicel and pressure. Has been febrile intermittently, though improving, grew Klebsiella on urine cx, blood cx with staph epi. Now with recurrent bloody secretions from trach s/p rpt bronch 11/30, spiked another temp 12/1/17 (102.7) and repeat cultures drawn. Also with DAVID collapse with concern for pneumothorax vs broncho-pleural fistula.     GI consulted for placement of peg tube.     Allergies:  No Known Allergies      Home Medications:  unknown     Hospital Medications:  acetaminophen   Tablet 650 milliGRAM(s) Oral every 6 hours PRN  ALBUTerol    90 MICROgram(s) HFA Inhaler 2 Puff(s) Inhalation every 6 hours  buDESOnide 160 MICROgram(s)/formoterol 4.5 MICROgram(s) Inhaler 2 Puff(s) Inhalation two times a day  chlorhexidine 0.12% Liquid 15 milliLiter(s) Swish and Spit two times a day  chlorhexidine 4% Liquid 1 Application(s) Topical daily  diazepam    Tablet 2 milliGRAM(s) Oral two times a day  enoxaparin Injectable 40 milliGRAM(s) SubCutaneous daily  ferrous    sulfate Liquid 300 milliGRAM(s) Enteral Tube daily  folic acid 1 milliGRAM(s) Oral daily  furosemide   Injectable 40 milliGRAM(s) IV Push once  insulin lispro (HumaLOG) corrective regimen sliding scale   SubCutaneous every 6 hours  ipratropium 17 MICROgram(s) HFA Inhaler 1 Puff(s) Inhalation every 6 hours  polyethylene glycol 3350 17 Gram(s) Oral at bedtime  predniSONE   Tablet 10 milliGRAM(s) Oral daily  senna Syrup 10 milliLiter(s) Oral at bedtime  valproic  acid Syrup 500 milliGRAM(s) Oral two times a day  voriconazole IVPB 300 milliGRAM(s) IV Intermittent every 12 hours      PMHX/PSHX:  History of pneumothorax  COPD (chronic obstructive pulmonary disease)  Asthma  Hypertension  Sarcoidosis  History of thoracotomy  No significant past surgical history      Family history:  Family history of pancreatic cancer  Family history of essential hypertension (Father)      Social History: denies smoking, alcohol, drug use.     ROS:     unable to assess      PHYSICAL EXAM:     General: NAD, well-developed  Eyes: EOMI, PERRLA, conjunctiva and sclera clear  Neck: Supple, No JVD  Chest/Lung: Clear to auscultation bilaterally on ant exam; No wheezes  Heart: Regular rate and rhythm; No murmurs, rubs, or gallops. Capillary refill WNL  Abdome: Soft, Nontender, Nondistended; Bowel sounds present  Extremities: (+) anasarca  Psych: AAOx3  Neurology: non-focal, follows commands, able to move all extrems, able to communicate.   Skin: No rashes or lesions    Vital Signs:  Vital Signs Last 24 Hrs  T(C): 36.8 (04 Dec 2017 08:00), Max: 37.4 (03 Dec 2017 20:00)  T(F): 98.2 (04 Dec 2017 08:00), Max: 99.4 (03 Dec 2017 20:00)  HR: 86 (04 Dec 2017 11:10) (72 - 110)  BP: 134/89 (04 Dec 2017 11:00) (110/83 - 174/111)  BP(mean): 98 (04 Dec 2017 11:00) (73 - 127)  RR: 13 (04 Dec 2017 11:00) (12 - 23)  SpO2: 100% (04 Dec 2017 11:10) (96% - 100%)  Daily     Daily     LABS:                        7.6    8.16  )-----------( 170      ( 04 Dec 2017 03:30 )             23.7     12-04    140  |  93<L>  |  14  ----------------------------<  110<H>  3.7   |  39<H>  |  0.61    Ca    8.3<L>      04 Dec 2017 03:30  Phos  3.5     12-04  Mg     2.1     12-04    TPro  5.5<L>  /  Alb  2.3<L>  /  TBili  0.2  /  DBili  x   /  AST  16  /  ALT  11  /  AlkPhos  60  12-03    LIVER FUNCTIONS - ( 03 Dec 2017 02:30 )  Alb: 2.3 g/dL / Pro: 5.5 g/dL / ALK PHOS: 60 u/L / ALT: 11 u/L / AST: 16 u/L / GGT: x           PT/INR - ( 04 Dec 2017 03:30 )   PT: 13.9 SEC;   INR: 1.24          PTT - ( 04 Dec 2017 03:30 )  PTT:27.0 SEC        Imaging: Chief Complaint:  Patient is a 47y old  Male who presents with a chief complaint of hemoptysis (09 Nov 2017 00:32)      HPI:  47 year old male with sarcoidosis, cerebrovascular infarct with PFO on echo and paroxysmal afib in September started on eliquis c/b recurrent hemoptysis (Eliquis d/c'd on 9/20, then restarted between 10/23-27 after hemoptysis improved), d/c'd to rehab then returned 11/9 for recurrent hemoptysis requiring intubation and CTICU admission for management of hemoptysis including IR embolization (11/16) and serial bronch (11/17 and 11/20) in CTICU with no active bleeding visualized, now remains intubated with difficulty weaning vent as during CPAP trials pt fails to initiate breaths, unclear etiology. Transferred to MICU from CTICU for continued vent management and attempted weaning. Bedside trach placed 11/24 c/b episode of hypoxia and bleeding around trach site after suctioning, resolved after surgicel and pressure. Has been febrile intermittently, though improving, grew Klebsiella on urine cx, blood cx with staph epi. Now with recurrent bloody secretions from trach s/p rpt bronch 11/30, spiked another temp 12/1/17 (102.7) and repeat cultures drawn. Also with DAVID collapse with concern for pneumothorax vs broncho-pleural fistula.     GI consulted for placement of peg tube.     Allergies:  No Known Allergies      Home Medications:  unknown     Hospital Medications:  acetaminophen   Tablet 650 milliGRAM(s) Oral every 6 hours PRN  ALBUTerol    90 MICROgram(s) HFA Inhaler 2 Puff(s) Inhalation every 6 hours  buDESOnide 160 MICROgram(s)/formoterol 4.5 MICROgram(s) Inhaler 2 Puff(s) Inhalation two times a day  chlorhexidine 0.12% Liquid 15 milliLiter(s) Swish and Spit two times a day  chlorhexidine 4% Liquid 1 Application(s) Topical daily  diazepam    Tablet 2 milliGRAM(s) Oral two times a day  enoxaparin Injectable 40 milliGRAM(s) SubCutaneous daily  ferrous    sulfate Liquid 300 milliGRAM(s) Enteral Tube daily  folic acid 1 milliGRAM(s) Oral daily  furosemide   Injectable 40 milliGRAM(s) IV Push once  insulin lispro (HumaLOG) corrective regimen sliding scale   SubCutaneous every 6 hours  ipratropium 17 MICROgram(s) HFA Inhaler 1 Puff(s) Inhalation every 6 hours  polyethylene glycol 3350 17 Gram(s) Oral at bedtime  predniSONE   Tablet 10 milliGRAM(s) Oral daily  senna Syrup 10 milliLiter(s) Oral at bedtime  valproic  acid Syrup 500 milliGRAM(s) Oral two times a day  voriconazole IVPB 300 milliGRAM(s) IV Intermittent every 12 hours      PMHX/PSHX:  History of pneumothorax  COPD (chronic obstructive pulmonary disease)  Asthma  Hypertension  Sarcoidosis  History of thoracotomy  No significant past surgical history      Family history:  Family history of pancreatic cancer  Family history of essential hypertension (Father)      Social History: denies smoking, alcohol, drug use.     ROS:     unable to assess      PHYSICAL EXAM:     General: NAD, well-developed  Eyes: EOMI, PERRLA, conjunctiva and sclera clear  Neck: Supple, No JVD  Chest/Lung: Clear to auscultation bilaterally on ant exam; No wheezes  Heart: Regular rate and rhythm; No murmurs, rubs, or gallops. Capillary refill WNL  Abdome: Soft, Nontender, Nondistended; Bowel sounds present  Extremities: (+) anasarca  Psych: AAOx3  Neurology: non-focal, follows commands, able to move all extrems, able to communicate.   Skin: No rashes or lesions    Vital Signs:  Vital Signs Last 24 Hrs  T(C): 36.8 (04 Dec 2017 08:00), Max: 37.4 (03 Dec 2017 20:00)  T(F): 98.2 (04 Dec 2017 08:00), Max: 99.4 (03 Dec 2017 20:00)  HR: 86 (04 Dec 2017 11:10) (72 - 110)  BP: 134/89 (04 Dec 2017 11:00) (110/83 - 174/111)  BP(mean): 98 (04 Dec 2017 11:00) (73 - 127)  RR: 13 (04 Dec 2017 11:00) (12 - 23)  SpO2: 100% (04 Dec 2017 11:10) (96% - 100%)  Daily     Daily     LABS:                        7.6    8.16  )-----------( 170      ( 04 Dec 2017 03:30 )             23.7     12-04    140  |  93<L>  |  14  ----------------------------<  110<H>  3.7   |  39<H>  |  0.61    Ca    8.3<L>      04 Dec 2017 03:30  Phos  3.5     12-04  Mg     2.1     12-04    TPro  5.5<L>  /  Alb  2.3<L>  /  TBili  0.2  /  DBili  x   /  AST  16  /  ALT  11  /  AlkPhos  60  12-03    LIVER FUNCTIONS - ( 03 Dec 2017 02:30 )  Alb: 2.3 g/dL / Pro: 5.5 g/dL / ALK PHOS: 60 u/L / ALT: 11 u/L / AST: 16 u/L / GGT: x           PT/INR - ( 04 Dec 2017 03:30 )   PT: 13.9 SEC;   INR: 1.24          PTT - ( 04 Dec 2017 03:30 )  PTT:27.0 SEC        Imaging:    < from: Xray Abdomen 1 View-Upright Only (11.21.17 @ 11:23) >  COMPARISON: None.    FINDINGS:   There is an enteric feeding tubecoursing below the diaphragm with the   tip and side port overlying the distal stomach.    Nonobstructive bowel gas pattern. No free air.    IMPRESSION: Enteric feeding tube coursing below the diaphragm the tip and   side port overlying the distal stomach.          < end of copied text >

## 2017-12-04 NOTE — PROCEDURE NOTE - NSINFORMCONSENT_GEN_A_CORE
This was an emergent procedure.
Benefits, risks, and possible complications of procedure explained to patient/caregiver who verbalized understanding and gave written consent.
Benefits, risks, and possible complications of procedure explained to patient/caregiver who verbalized understanding and gave written consent.
This was an emergent procedure.
This was an emergent procedure.
Benefits, risks, and possible complications of procedure explained to patient/caregiver who verbalized understanding and gave written consent.
Benefits, risks, and possible complications of procedure explained to patient/caregiver who verbalized understanding and gave written consent.

## 2017-12-04 NOTE — PROGRESS NOTE ADULT - ATTENDING COMMENTS
Respiratory failure secondary to Sarcoidosis, AF, PFO off AC due to hemoptysis and bleeding around  tracheostomy. S/p DAVID lobectomy in september for hemoptysis now with APF/BPF s/p left CT, tracheostomy week ago unable to wean off mech ventilation.  CT is out, patient is hemodynamically stable with normal(not elevated ) peak pressures, we'll conserve clinically. Based on CT from 12/1 appearance of visceral pleura suggest that this lung will jot expand.  He cannot do well PST. likely secondary to underlying sarcoidosis damaged lungs and small trach tube--> considering changing trach to bigger once track is more matured.  Continue vori.  Check LE for dvt and need for ac (questionable non-compressible deep vein on left on bedside US).

## 2017-12-04 NOTE — PROCEDURE NOTE - NSTIMEOUT_GEN_A_CORE
Patient's first and last name, , procedure, and correct site confirmed prior to the start of procedure.

## 2017-12-04 NOTE — PROGRESS NOTE ADULT - PROBLEM SELECTOR PLAN 2
intubated: try to titrate as tolerated!  11/18 on ventilator support. Pt is sedated and paralyzed. defer ventilator Management/ weaning per CTICU team.  11/19 still sedated and paralyzed. Plan to wean off Nimbex today per Primary RN. ABGs reviewed.  11/20:ff paralyzing agent! But sedated !!  11/21: awake and alert: responding to simple commands: weaning trials  11/22: currently sedated: ? trach now?  11/23: for trach in AM  11/24: trach today  11/25: S/P trach yesterday: chest x-ray noted: Cont full vent support and wean if tolerates  11/26: htiis2sf with trach and full mechanical ventilation: Discussed with MICU attending in detail: for rpt ct scan chest  11/28: rpt ct scan c hst reviewed: for possible chest tube on rt side  11/30: on full vent support with weaning trials  12/01:cont weaning trails as tolerated; prednisone dose lowered!!  12/2 on CPAP trial. Continue wean off as tolerate  12/3 CPAP as tolerated  12/04: cont cpap trial: chest tube is out: Likely has BPF on the left side:

## 2017-12-04 NOTE — PROGRESS NOTE ADULT - SUBJECTIVE AND OBJECTIVE BOX
Patient is a 47y old  Male who presents with a chief complaint of hemoptysis (09 Nov 2017 00:32)  chest  tube fell out   ? DVT in legs       Any change in ROS:     MEDICATIONS  (STANDING):  ALBUTerol    90 MICROgram(s) HFA Inhaler 2 Puff(s) Inhalation every 6 hours  buDESOnide 160 MICROgram(s)/formoterol 4.5 MICROgram(s) Inhaler 2 Puff(s) Inhalation two times a day  chlorhexidine 0.12% Liquid 15 milliLiter(s) Swish and Spit two times a day  chlorhexidine 4% Liquid 1 Application(s) Topical daily  diazepam    Tablet 2 milliGRAM(s) Oral two times a day  enoxaparin Injectable 40 milliGRAM(s) SubCutaneous daily  ferrous    sulfate Liquid 300 milliGRAM(s) Enteral Tube daily  folic acid 1 milliGRAM(s) Oral daily  insulin lispro (HumaLOG) corrective regimen sliding scale   SubCutaneous every 6 hours  ipratropium 17 MICROgram(s) HFA Inhaler 1 Puff(s) Inhalation every 6 hours  polyethylene glycol 3350 17 Gram(s) Oral at bedtime  predniSONE   Tablet 10 milliGRAM(s) Oral daily  senna Syrup 10 milliLiter(s) Oral at bedtime  valproic  acid Syrup 500 milliGRAM(s) Oral two times a day  voriconazole IVPB 300 milliGRAM(s) IV Intermittent every 12 hours    MEDICATIONS  (PRN):  acetaminophen   Tablet 650 milliGRAM(s) Oral every 6 hours PRN For Temp greater than 38 C (100.4 F)    Vital Signs Last 24 Hrs  T(C): 37.2 (04 Dec 2017 16:00), Max: 37.4 (03 Dec 2017 20:00)  T(F): 98.9 (04 Dec 2017 16:00), Max: 99.4 (03 Dec 2017 20:00)  HR: 94 (04 Dec 2017 16:12) (86 - 110)  BP: 135/85 (04 Dec 2017 16:00) (117/68 - 174/111)  BP(mean): 97 (04 Dec 2017 16:00) (80 - 127)  RR: 17 (04 Dec 2017 16:00) (12 - 23)  SpO2: 100% (04 Dec 2017 16:12) (96% - 100%)  Mode: CPAP with PS  FiO2: 40  PEEP: 5  PS: 15  MAP: 10    I&O's Summary    03 Dec 2017 07:01  -  04 Dec 2017 07:00  --------------------------------------------------------  IN: 3257 mL / OUT: 605 mL / NET: 2652 mL    04 Dec 2017 07:01  -  04 Dec 2017 18:01  --------------------------------------------------------  IN: 140 mL / OUT: 1000 mL / NET: -860 mL          Physical Exam:   GENERAL: NAD, well-groomed, well-developed  HEENT: GIOVANI/   Atraumatic, Normocephalic  ENMT: No tonsillar erythema, exudates, or enlargement; Moist mucous membranes, Good dentition, No lesions  NECK: Supple, No JVD, Normal thyroid  CHEST/LUNG: coarse rhonchi  CVS: Regular rate and rhythm; No murmurs, rubs, or gallops  GI: : Soft, Nontender, Nondistended; Bowel sounds present  NERVOUS SYSTEM:  Alert & awake  EXTREMITIES:  2+ Peripheral Pulses, No clubbing, cyanosis, or edema  LYMPH: No lymphadenopathy noted  SKIN: No rashes or lesions  ENDOCRINOLOGY: No Thyromegaly  PSYCH: Appropriate    Labs:                              7.6    8.16  )-----------( 170      ( 04 Dec 2017 03:30 )             23.7                         7.6    8.21  )-----------( 152      ( 03 Dec 2017 15:30 )             23.7                         6.4    8.46  )-----------( 151      ( 03 Dec 2017 02:30 )             20.6                         7.5    12.69 )-----------( 156      ( 02 Dec 2017 02:28 )             23.5                         7.4    24.05 )-----------( 185      ( 01 Dec 2017 04:00 )             23.3                         8.3    19.72 )-----------( 197      ( 30 Nov 2017 20:45 )             26.0     12-04    140  |  93<L>  |  14  ----------------------------<  110<H>  3.7   |  39<H>  |  0.61  12-03    143  |  96<L>  |  15  ----------------------------<  139<H>  3.6   |  37<H>  |  0.59  12-02    144  |  97<L>  |  12  ----------------------------<  140<H>  4.0   |  40<H>  |  0.79  12-01    145  |  97<L>  |  7   ----------------------------<  96  3.8   |  40<H>  |  0.74    Ca    8.3<L>      04 Dec 2017 03:30  Ca    8.5      03 Dec 2017 02:30  Phos  3.5     12-04  Phos  3.2     12-03  Mg     2.1     12-04  Mg     2.1     12-03    TPro  5.5<L>  /  Alb  2.3<L>  /  TBili  0.2  /  DBili  x   /  AST  16  /  ALT  11  /  AlkPhos  60  12-03  TPro  5.5<L>  /  Alb  2.3<L>  /  TBili  0.2  /  DBili  x   /  AST  18  /  ALT  11  /  AlkPhos  68  12-02  TPro  5.5<L>  /  Alb  2.3<L>  /  TBili  0.2  /  DBili  x   /  AST  18  /  ALT  12  /  AlkPhos  66  12-01    CAPILLARY BLOOD GLUCOSE      POCT Blood Glucose.: 120 mg/dL (04 Dec 2017 17:51)  POCT Blood Glucose.: 138 mg/dL (04 Dec 2017 12:20)  POCT Blood Glucose.: 101 mg/dL (04 Dec 2017 06:27)  POCT Blood Glucose.: 103 mg/dL (03 Dec 2017 23:39)  POCT Blood Glucose.: 108 mg/dL (03 Dec 2017 18:07)      LIVER FUNCTIONS - ( 03 Dec 2017 02:30 )  Alb: 2.3 g/dL / Pro: 5.5 g/dL / ALK PHOS: 60 u/L / ALT: 11 u/L / AST: 16 u/L / GGT: x           PT/INR - ( 04 Dec 2017 03:30 )   PT: 13.9 SEC;   INR: 1.24          PTT - ( 04 Dec 2017 03:30 )  PTT:27.0 SEC    Cultures:           Wound culture:                12-01 @ 06:18  Organism --  Culture w/ gram stain --  Specimen Source BLOOD PERIPHERAL    Wound culture:                11-29 @ 15:21  Organism --  Culture w/ gram stain --  Specimen Source BLOOD VENOUS    Wound culture:                11-29 @ 04:37  Organism --  Culture w/ gram stain --  Specimen Source BLOOD      Abscess culture:             12-01 @ 06:18  Organism --  Gram Stain --  Specimen Source BLOOD PERIPHERAL    Abscess culture:             11-29 @ 15:21  Organism --  Gram Stain --  Specimen Source BLOOD VENOUS    Abscess culture:             11-29 @ 04:37  Organism --  Gram Stain --  Specimen Source BLOOD        Tissue culture:           12-01 @ 06:18  Organism --  Gram Stain --  Specimen Source BLOOD PERIPHERAL    Tissue culture:           11-29 @ 15:21  Organism --  Gram Stain --  Specimen Source BLOOD VENOUS    Tissue culture:           11-29 @ 04:37  Organism --  Gram Stain --  Specimen Source BLOOD      Body Fluid Smear & Culture:                        12-01 @ 06:18  AFB Smear  --  Culture Acid Fast Body Fluid w/ Smear  --  Culture Acid Fast Smear Concentrated   --    Culture Results:     --  Specimen Source BLOOD PERIPHERAL    Body Fluid Smear & Culture:                        11-29 @ 15:21  AFB Smear  --  Culture Acid Fast Body Fluid w/ Smear  --  Culture Acid Fast Smear Concentrated   --    Culture Results:     --  Specimen Source BLOOD VENOUS    Body Fluid Smear & Culture:                        11-29 @ 04:37  AFB Smear  --  Culture Acid Fast Body Fluid w/ Smear  --  Culture Acid Fast Smear Concentrated   --        < from: US Duplex Venous Lower Ext Complete, Bilateral (12.04.17 @ 14:33) >  TECHNIQUE: Duplex sonography of the BILATERAL LOWER extremities with   color and spectral Doppler, with and without compression.      FINDINGS:    There is normal compressibility of the bilateral common femoral, femoral,   popliteal, and posterior tibial veins. The bilateral peroneal veins are   not visualized.    Doppler examination shows normal spontaneous and phasic flow.    IMPRESSION:     No evidence of bilateral lower extremity deep venous thrombosis.   Nonvisualization of the bilateral peroneal veins.              MAULIK PUENTES M.D., RADIOLOGY RESIDENT  This document has been electronically signed.  SINDY YUSUF M.D., ATTENDING RADIOLOGIST  This document has been electronically signed. Dec  4 2017  3:06PM    < end of copied text >      Culture Results:     --  Specimen Source BLOOD              Studies  Chest X-RAY  CT SCAN Chest   Venous Dopplers: LE:   CT Abdomen  Others

## 2017-12-04 NOTE — PROGRESS NOTE ADULT - PROBLEM SELECTOR PLAN 5
on prednisone.  11/10 continue steroid  11/11 on steroid  11/12 on oral steroid  11/13: cont oral steroids!!  11/14: Has pretty poor lungs secondary to steroids: Cont current therapy!!  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  11/27: cont on steroids  11/30: om 30 mg of prednisone  12/01:on 20 mg of prednisone now !!  12/02 Prednisone  12/3 on Prednisone  12/04:low taper of steroids

## 2017-12-04 NOTE — PROGRESS NOTE ADULT - SUBJECTIVE AND OBJECTIVE BOX
Raymond Carmona, PGY 2  [facility] Pager: [phone]  [facility] Pager: [phone]  Spectra: [phone]    CHIEF COMPLAINT: hemoptysis    Interval Events: Pt with some intermittent episodes of agitation after coming off sedation. Now improved after re-orienting patient. No further hemoptysis, hgb stable    ROS  General: Denies pain  Cardio: Denies CP  Pulm: Denies SOB  GI: Denies abdominal pain  Neuro: (+) generalized weakness  Musc Skel: Deniesjoint pain  Uro: Denies dysuria  Skin: Denies recent rashes  Endocrine: Denies polyuria, polydipsia  Psych: Denies anxiety and depressed mood    OBJECTIVE:  ICU Vital Signs Last 24 Hrs  T(C): 36.8 (04 Dec 2017 04:00), Max: 37.4 (03 Dec 2017 20:00)  T(F): 98.3 (04 Dec 2017 04:00), Max: 99.4 (03 Dec 2017 20:00)  HR: 104 (04 Dec 2017 06:00) (68 - 110)  BP: 167/101 (04 Dec 2017 06:00) (106/68 - 174/111)  BP(mean): 117 (04 Dec 2017 06:00) (73 - 127)  ABP: --  ABP(mean): --  RR: 19 (04 Dec 2017 06:00) (12 - 23)  SpO2: 100% (04 Dec 2017 06:00) (100% - 100%)    Mode: AC/ CMV (Assist Control/ Continuous Mandatory Ventilation), RR (machine): 12, TV (machine): 500, FiO2: 40, PEEP: 5, MAP: 10, PIP: 24    12-03 @ 07:01  -  12-04 @ 07:00  --------------------------------------------------------  IN: 3257 mL / OUT: 605 mL / NET: 2652 mL      CAPILLARY BLOOD GLUCOSE      POCT Blood Glucose.: 101 mg/dL (04 Dec 2017 06:27)    PHYSICAL EXAM:  General: NAD, well-developed  Eyes: EOMI, PERRLA, conjunctiva and sclera clear  Neck: Supple, No JVD  Chest/Lung: Clear to auscultation bilaterally on ant exam; No wheezes  Heart: Regular rate and rhythm; No murmurs, rubs, or gallops. Capillary refill WNL  Abdome: Soft, Nontender, Nondistended; Bowel sounds present  Extremities: (+) anasarca  Psych: AAOx3  Neurology: non-focal, follows commands, able to move all extrems, able to communicate.   Skin: No rashes or lesions    HOSPITAL MEDICATIONS:  MEDICATIONS  (STANDING):  ALBUTerol    90 MICROgram(s) HFA Inhaler 2 Puff(s) Inhalation every 6 hours  buDESOnide 160 MICROgram(s)/formoterol 4.5 MICROgram(s) Inhaler 2 Puff(s) Inhalation two times a day  chlorhexidine 0.12% Liquid 15 milliLiter(s) Swish and Spit two times a day  chlorhexidine 4% Liquid 1 Application(s) Topical daily  enoxaparin Injectable 40 milliGRAM(s) SubCutaneous daily  ferrous    sulfate Liquid 300 milliGRAM(s) Enteral Tube daily  folic acid 1 milliGRAM(s) Oral daily  insulin lispro (HumaLOG) corrective regimen sliding scale   SubCutaneous every 6 hours  ipratropium 17 MICROgram(s) HFA Inhaler 1 Puff(s) Inhalation every 6 hours  meropenem IVPB      meropenem IVPB 1000 milliGRAM(s) IV Intermittent every 8 hours  polyethylene glycol 3350 17 Gram(s) Oral at bedtime  predniSONE   Tablet 10 milliGRAM(s) Oral daily  senna Syrup 10 milliLiter(s) Oral at bedtime  valproic  acid Syrup 500 milliGRAM(s) Oral two times a day  vancomycin  IVPB 1000 milliGRAM(s) IV Intermittent every 8 hours  voriconazole IVPB 300 milliGRAM(s) IV Intermittent every 12 hours    MEDICATIONS  (PRN):  acetaminophen   Tablet 650 milliGRAM(s) Oral every 6 hours PRN For Temp greater than 38 C (100.4 F)      LABS:                        7.6    8.16  )-----------( 170      ( 04 Dec 2017 03:30 )             23.7     12-04    140  |  93<L>  |  14  ----------------------------<  110<H>  3.7   |  39<H>  |  0.61    Ca    8.3<L>      04 Dec 2017 03:30  Phos  3.5     12-04  Mg     2.1     12-04    TPro  5.5<L>  /  Alb  2.3<L>  /  TBili  0.2  /  DBili  x   /  AST  16  /  ALT  11  /  AlkPhos  60  12-03    PT/INR - ( 04 Dec 2017 03:30 )   PT: 13.9 SEC;   INR: 1.24          PTT - ( 04 Dec 2017 03:30 )  PTT:27.0 SEC    MICROBIOLOGY:     RADIOLOGY:  [ ] Reviewed and interpreted by me Raymond Carmona, PGY 2  [facility] Pager: [phone]  [facility] Pager: [phone]  Spectra: [phone]    CHIEF COMPLAINT: hemoptysis    Interval Events: Pt with some intermittent episodes of agitation after coming off sedation. Now improved after re-orienting patient. No further hemoptysis, hgb stable    ROS  General: Denies pain  Cardio: Denies CP  Pulm: Denies SOB  GI: Denies abdominal pain  Neuro: (+) generalized weakness  Musc Skel: Denies joint pain  Uro: Denies dysuria  Skin: Denies recent rashes  Endocrine: Denies polyuria, polydipsia  Psych: Denies anxiety and depressed mood    OBJECTIVE:  ICU Vital Signs Last 24 Hrs  T(C): 36.8 (04 Dec 2017 04:00), Max: 37.4 (03 Dec 2017 20:00)  T(F): 98.3 (04 Dec 2017 04:00), Max: 99.4 (03 Dec 2017 20:00)  HR: 104 (04 Dec 2017 06:00) (68 - 110)  BP: 167/101 (04 Dec 2017 06:00) (106/68 - 174/111)  BP(mean): 117 (04 Dec 2017 06:00) (73 - 127)  ABP: --  ABP(mean): --  RR: 19 (04 Dec 2017 06:00) (12 - 23)  SpO2: 100% (04 Dec 2017 06:00) (100% - 100%)    Mode: AC/ CMV (Assist Control/ Continuous Mandatory Ventilation), RR (machine): 12, TV (machine): 500, FiO2: 40, PEEP: 5, MAP: 10, PIP: 24    12-03 @ 07:01  -  12-04 @ 07:00  --------------------------------------------------------  IN: 3257 mL / OUT: 605 mL / NET: 2652 mL      CAPILLARY BLOOD GLUCOSE      POCT Blood Glucose.: 101 mg/dL (04 Dec 2017 06:27)    PHYSICAL EXAM:  General: NAD, well-developed  Eyes: EOMI, PERRLA, conjunctiva and sclera clear  Neck: Supple, No JVD  Chest/Lung: Clear to auscultation bilaterally on ant exam; No wheezes  Heart: Regular rate and rhythm; No murmurs, rubs, or gallops. Capillary refill WNL  Abdome: Soft, Nontender, Nondistended; Bowel sounds present  Extremities: (+) anasarca  Psych: AAOx3  Neurology: non-focal, follows commands, able to move all extrems, able to communicate.   Skin: No rashes or lesions    HOSPITAL MEDICATIONS:  MEDICATIONS  (STANDING):  ALBUTerol    90 MICROgram(s) HFA Inhaler 2 Puff(s) Inhalation every 6 hours  buDESOnide 160 MICROgram(s)/formoterol 4.5 MICROgram(s) Inhaler 2 Puff(s) Inhalation two times a day  chlorhexidine 0.12% Liquid 15 milliLiter(s) Swish and Spit two times a day  chlorhexidine 4% Liquid 1 Application(s) Topical daily  enoxaparin Injectable 40 milliGRAM(s) SubCutaneous daily  ferrous    sulfate Liquid 300 milliGRAM(s) Enteral Tube daily  folic acid 1 milliGRAM(s) Oral daily  insulin lispro (HumaLOG) corrective regimen sliding scale   SubCutaneous every 6 hours  ipratropium 17 MICROgram(s) HFA Inhaler 1 Puff(s) Inhalation every 6 hours  meropenem IVPB      meropenem IVPB 1000 milliGRAM(s) IV Intermittent every 8 hours  polyethylene glycol 3350 17 Gram(s) Oral at bedtime  predniSONE   Tablet 10 milliGRAM(s) Oral daily  senna Syrup 10 milliLiter(s) Oral at bedtime  valproic  acid Syrup 500 milliGRAM(s) Oral two times a day  vancomycin  IVPB 1000 milliGRAM(s) IV Intermittent every 8 hours  voriconazole IVPB 300 milliGRAM(s) IV Intermittent every 12 hours    MEDICATIONS  (PRN):  acetaminophen   Tablet 650 milliGRAM(s) Oral every 6 hours PRN For Temp greater than 38 C (100.4 F)      LABS:                        7.6    8.16  )-----------( 170      ( 04 Dec 2017 03:30 )             23.7     12-04    140  |  93<L>  |  14  ----------------------------<  110<H>  3.7   |  39<H>  |  0.61    Ca    8.3<L>      04 Dec 2017 03:30  Phos  3.5     12-04  Mg     2.1     12-04    TPro  5.5<L>  /  Alb  2.3<L>  /  TBili  0.2  /  DBili  x   /  AST  16  /  ALT  11  /  AlkPhos  60  12-03    PT/INR - ( 04 Dec 2017 03:30 )   PT: 13.9 SEC;   INR: 1.24          PTT - ( 04 Dec 2017 03:30 )  PTT:27.0 SEC    MICROBIOLOGY:     RADIOLOGY:  [ ] Reviewed and interpreted by me

## 2017-12-05 LAB
APTT BLD: 28.1 SEC — SIGNIFICANT CHANGE UP (ref 27.5–37.4)
BASE EXCESS BLDA CALC-SCNC: 17.2 MMOL/L — SIGNIFICANT CHANGE UP
BASE EXCESS BLDA CALC-SCNC: 18 MMOL/L — SIGNIFICANT CHANGE UP
BASOPHILS # BLD AUTO: 0.01 K/UL — SIGNIFICANT CHANGE UP (ref 0–0.2)
BASOPHILS # BLD AUTO: 0.02 K/UL — SIGNIFICANT CHANGE UP (ref 0–0.2)
BASOPHILS NFR BLD AUTO: 0.1 % — SIGNIFICANT CHANGE UP (ref 0–2)
BASOPHILS NFR BLD AUTO: 0.1 % — SIGNIFICANT CHANGE UP (ref 0–2)
BUN SERPL-MCNC: 10 MG/DL — SIGNIFICANT CHANGE UP (ref 7–23)
CALCIUM SERPL-MCNC: 8.8 MG/DL — SIGNIFICANT CHANGE UP (ref 8.4–10.5)
CHLORIDE BLDA-SCNC: 91 MMOL/L — LOW (ref 96–108)
CHLORIDE BLDA-SCNC: 92 MMOL/L — LOW (ref 96–108)
CHLORIDE SERPL-SCNC: 93 MMOL/L — LOW (ref 98–107)
CO2 SERPL-SCNC: 40 MMOL/L — HIGH (ref 22–31)
CREAT SERPL-MCNC: 0.77 MG/DL — SIGNIFICANT CHANGE UP (ref 0.5–1.3)
EOSINOPHIL # BLD AUTO: 0.06 K/UL — SIGNIFICANT CHANGE UP (ref 0–0.5)
EOSINOPHIL # BLD AUTO: 0.07 K/UL — SIGNIFICANT CHANGE UP (ref 0–0.5)
EOSINOPHIL NFR BLD AUTO: 0.3 % — SIGNIFICANT CHANGE UP (ref 0–6)
EOSINOPHIL NFR BLD AUTO: 0.5 % — SIGNIFICANT CHANGE UP (ref 0–6)
GLUCOSE BLDA-MCNC: 121 MG/DL — HIGH (ref 70–99)
GLUCOSE BLDA-MCNC: 86 MG/DL — SIGNIFICANT CHANGE UP (ref 70–99)
GLUCOSE BLDC GLUCOMTR-MCNC: 100 MG/DL — HIGH (ref 70–99)
GLUCOSE BLDC GLUCOMTR-MCNC: 110 MG/DL — HIGH (ref 70–99)
GLUCOSE BLDC GLUCOMTR-MCNC: 79 MG/DL — SIGNIFICANT CHANGE UP (ref 70–99)
GLUCOSE BLDC GLUCOMTR-MCNC: 79 MG/DL — SIGNIFICANT CHANGE UP (ref 70–99)
GLUCOSE BLDC GLUCOMTR-MCNC: 98 MG/DL — SIGNIFICANT CHANGE UP (ref 70–99)
GLUCOSE SERPL-MCNC: 96 MG/DL — SIGNIFICANT CHANGE UP (ref 70–99)
HCO3 BLDA-SCNC: 41 MMOL/L — HIGH (ref 22–26)
HCO3 BLDA-SCNC: 41 MMOL/L — HIGH (ref 22–26)
HCT VFR BLD CALC: 27.1 % — LOW (ref 39–50)
HCT VFR BLD CALC: 28.4 % — LOW (ref 39–50)
HCT VFR BLDA CALC: 24.1 % — LOW (ref 39–51)
HCT VFR BLDA CALC: 25.7 % — LOW (ref 39–51)
HGB BLD-MCNC: 8.3 G/DL — LOW (ref 13–17)
HGB BLD-MCNC: 8.6 G/DL — LOW (ref 13–17)
HGB BLDA-MCNC: 7.7 G/DL — LOW (ref 13–17)
HGB BLDA-MCNC: 8.1 G/DL — LOW (ref 13–17)
IMM GRANULOCYTES # BLD AUTO: 0.07 # — SIGNIFICANT CHANGE UP
IMM GRANULOCYTES # BLD AUTO: 0.15 # — SIGNIFICANT CHANGE UP
IMM GRANULOCYTES NFR BLD AUTO: 0.6 % — SIGNIFICANT CHANGE UP (ref 0–1.5)
IMM GRANULOCYTES NFR BLD AUTO: 0.7 % — SIGNIFICANT CHANGE UP (ref 0–1.5)
INR BLD: 1.27 — HIGH (ref 0.88–1.17)
LACTATE BLDA-SCNC: 0.5 MMOL/L — SIGNIFICANT CHANGE UP (ref 0.5–2)
LACTATE BLDA-SCNC: 0.7 MMOL/L — SIGNIFICANT CHANGE UP (ref 0.5–2)
LYMPHOCYTES # BLD AUTO: 1.35 K/UL — SIGNIFICANT CHANGE UP (ref 1–3.3)
LYMPHOCYTES # BLD AUTO: 1.43 K/UL — SIGNIFICANT CHANGE UP (ref 1–3.3)
LYMPHOCYTES # BLD AUTO: 10.7 % — LOW (ref 13–44)
LYMPHOCYTES # BLD AUTO: 6.8 % — LOW (ref 13–44)
MAGNESIUM SERPL-MCNC: 2 MG/DL — SIGNIFICANT CHANGE UP (ref 1.6–2.6)
MCHC RBC-ENTMCNC: 29.5 PG — SIGNIFICANT CHANGE UP (ref 27–34)
MCHC RBC-ENTMCNC: 29.7 PG — SIGNIFICANT CHANGE UP (ref 27–34)
MCHC RBC-ENTMCNC: 30.3 % — LOW (ref 32–36)
MCHC RBC-ENTMCNC: 30.6 % — LOW (ref 32–36)
MCV RBC AUTO: 96.4 FL — SIGNIFICANT CHANGE UP (ref 80–100)
MCV RBC AUTO: 97.9 FL — SIGNIFICANT CHANGE UP (ref 80–100)
MONOCYTES # BLD AUTO: 0.93 K/UL — HIGH (ref 0–0.9)
MONOCYTES # BLD AUTO: 1.54 K/UL — HIGH (ref 0–0.9)
MONOCYTES NFR BLD AUTO: 7.3 % — SIGNIFICANT CHANGE UP (ref 2–14)
MONOCYTES NFR BLD AUTO: 7.3 % — SIGNIFICANT CHANGE UP (ref 2–14)
NEUTROPHILS # BLD AUTO: 10.25 K/UL — HIGH (ref 1.8–7.4)
NEUTROPHILS # BLD AUTO: 17.92 K/UL — HIGH (ref 1.8–7.4)
NEUTROPHILS NFR BLD AUTO: 80.8 % — HIGH (ref 43–77)
NEUTROPHILS NFR BLD AUTO: 84.8 % — HIGH (ref 43–77)
NRBC # FLD: 0 — SIGNIFICANT CHANGE UP
NRBC # FLD: 0 — SIGNIFICANT CHANGE UP
PCO2 BLDA: 67 MMHG — HIGH (ref 35–48)
PCO2 BLDA: 69 MMHG — HIGH (ref 35–48)
PH BLDA: 7.41 PH — SIGNIFICANT CHANGE UP (ref 7.35–7.45)
PH BLDA: 7.42 PH — SIGNIFICANT CHANGE UP (ref 7.35–7.45)
PHOSPHATE SERPL-MCNC: 4.1 MG/DL — SIGNIFICANT CHANGE UP (ref 2.5–4.5)
PLATELET # BLD AUTO: 181 K/UL — SIGNIFICANT CHANGE UP (ref 150–400)
PLATELET # BLD AUTO: 217 K/UL — SIGNIFICANT CHANGE UP (ref 150–400)
PMV BLD: 12.2 FL — SIGNIFICANT CHANGE UP (ref 7–13)
PMV BLD: 12.9 FL — SIGNIFICANT CHANGE UP (ref 7–13)
PO2 BLDA: 152 MMHG — HIGH (ref 83–108)
PO2 BLDA: 165 MMHG — HIGH (ref 83–108)
POTASSIUM BLDA-SCNC: 3.9 MMOL/L — SIGNIFICANT CHANGE UP (ref 3.4–4.5)
POTASSIUM BLDA-SCNC: 4 MMOL/L — SIGNIFICANT CHANGE UP (ref 3.4–4.5)
POTASSIUM SERPL-MCNC: 4.2 MMOL/L — SIGNIFICANT CHANGE UP (ref 3.5–5.3)
POTASSIUM SERPL-SCNC: 4.2 MMOL/L — SIGNIFICANT CHANGE UP (ref 3.5–5.3)
PROTHROM AB SERPL-ACNC: 14.3 SEC — HIGH (ref 9.8–13.1)
RBC # BLD: 2.81 M/UL — LOW (ref 4.2–5.8)
RBC # BLD: 2.9 M/UL — LOW (ref 4.2–5.8)
RBC # FLD: 15.9 % — HIGH (ref 10.3–14.5)
RBC # FLD: 16.1 % — HIGH (ref 10.3–14.5)
SAO2 % BLDA: 99.5 % — HIGH (ref 95–99)
SAO2 % BLDA: 99.6 % — HIGH (ref 95–99)
SODIUM BLDA-SCNC: 139 MMOL/L — SIGNIFICANT CHANGE UP (ref 136–146)
SODIUM BLDA-SCNC: 139 MMOL/L — SIGNIFICANT CHANGE UP (ref 136–146)
SODIUM SERPL-SCNC: 141 MMOL/L — SIGNIFICANT CHANGE UP (ref 135–145)
WBC # BLD: 12.67 K/UL — HIGH (ref 3.8–10.5)
WBC # BLD: 21.13 K/UL — HIGH (ref 3.8–10.5)
WBC # FLD AUTO: 12.67 K/UL — HIGH (ref 3.8–10.5)
WBC # FLD AUTO: 21.13 K/UL — HIGH (ref 3.8–10.5)

## 2017-12-05 PROCEDURE — 43246 EGD PLACE GASTROSTOMY TUBE: CPT | Mod: GC

## 2017-12-05 PROCEDURE — 99291 CRITICAL CARE FIRST HOUR: CPT

## 2017-12-05 PROCEDURE — 99232 SBSQ HOSP IP/OBS MODERATE 35: CPT

## 2017-12-05 RX ORDER — CEFAZOLIN SODIUM 1 G
2000 VIAL (EA) INJECTION ONCE
Qty: 0 | Refills: 0 | Status: COMPLETED | OUTPATIENT
Start: 2017-12-05 | End: 2017-12-05

## 2017-12-05 RX ORDER — MIDAZOLAM HYDROCHLORIDE 1 MG/ML
2 INJECTION, SOLUTION INTRAMUSCULAR; INTRAVENOUS ONCE
Qty: 0 | Refills: 0 | Status: DISCONTINUED | OUTPATIENT
Start: 2017-12-05 | End: 2017-12-04

## 2017-12-05 RX ORDER — FENTANYL CITRATE 50 UG/ML
100 INJECTION INTRAVENOUS ONCE
Qty: 0 | Refills: 0 | Status: DISCONTINUED | OUTPATIENT
Start: 2017-12-05 | End: 2017-12-05

## 2017-12-05 RX ORDER — DEXTROSE 50 % IN WATER 50 %
25 SYRINGE (ML) INTRAVENOUS ONCE
Qty: 0 | Refills: 0 | Status: COMPLETED | OUTPATIENT
Start: 2017-12-05 | End: 2017-12-05

## 2017-12-05 RX ORDER — PANTOPRAZOLE SODIUM 20 MG/1
40 TABLET, DELAYED RELEASE ORAL DAILY
Qty: 0 | Refills: 0 | Status: DISCONTINUED | OUTPATIENT
Start: 2017-12-05 | End: 2018-01-10

## 2017-12-05 RX ORDER — FUROSEMIDE 40 MG
40 TABLET ORAL ONCE
Qty: 0 | Refills: 0 | Status: COMPLETED | OUTPATIENT
Start: 2017-12-05 | End: 2017-12-05

## 2017-12-05 RX ADMIN — PANTOPRAZOLE SODIUM 40 MILLIGRAM(S): 20 TABLET, DELAYED RELEASE ORAL at 12:08

## 2017-12-05 RX ADMIN — Medication 1 PUFF(S): at 03:58

## 2017-12-05 RX ADMIN — PROPOFOL 4.5 MICROGRAM(S)/KG/MIN: 10 INJECTION, EMULSION INTRAVENOUS at 05:28

## 2017-12-05 RX ADMIN — Medication 2 MILLIGRAM(S): at 18:20

## 2017-12-05 RX ADMIN — ALBUTEROL 2 PUFF(S): 90 AEROSOL, METERED ORAL at 21:13

## 2017-12-05 RX ADMIN — VORICONAZOLE 125 MILLIGRAM(S): 10 INJECTION, POWDER, LYOPHILIZED, FOR SOLUTION INTRAVENOUS at 23:05

## 2017-12-05 RX ADMIN — Medication 7.03 MICROGRAM(S)/KG/MIN: at 05:29

## 2017-12-05 RX ADMIN — Medication 40 MILLIGRAM(S): at 21:08

## 2017-12-05 RX ADMIN — Medication 1 PUFF(S): at 16:30

## 2017-12-05 RX ADMIN — FENTANYL CITRATE 100 MICROGRAM(S): 50 INJECTION INTRAVENOUS at 14:30

## 2017-12-05 RX ADMIN — Medication 500 MILLIGRAM(S): at 18:20

## 2017-12-05 RX ADMIN — Medication 10 MILLIGRAM(S): at 05:24

## 2017-12-05 RX ADMIN — Medication 500 MILLIGRAM(S): at 05:30

## 2017-12-05 RX ADMIN — VORICONAZOLE 125 MILLIGRAM(S): 10 INJECTION, POWDER, LYOPHILIZED, FOR SOLUTION INTRAVENOUS at 12:00

## 2017-12-05 RX ADMIN — Medication 25 MILLILITER(S): at 23:16

## 2017-12-05 RX ADMIN — ALBUTEROL 2 PUFF(S): 90 AEROSOL, METERED ORAL at 16:29

## 2017-12-05 RX ADMIN — Medication 1 PUFF(S): at 09:00

## 2017-12-05 RX ADMIN — PROPOFOL 4.5 MICROGRAM(S)/KG/MIN: 10 INJECTION, EMULSION INTRAVENOUS at 08:38

## 2017-12-05 RX ADMIN — CHLORHEXIDINE GLUCONATE 15 MILLILITER(S): 213 SOLUTION TOPICAL at 05:50

## 2017-12-05 RX ADMIN — Medication 100 MILLIGRAM(S): at 14:04

## 2017-12-05 RX ADMIN — CHLORHEXIDINE GLUCONATE 15 MILLILITER(S): 213 SOLUTION TOPICAL at 18:21

## 2017-12-05 RX ADMIN — Medication 1 PUFF(S): at 21:13

## 2017-12-05 RX ADMIN — ALBUTEROL 2 PUFF(S): 90 AEROSOL, METERED ORAL at 09:00

## 2017-12-05 RX ADMIN — BUDESONIDE AND FORMOTEROL FUMARATE DIHYDRATE 2 PUFF(S): 160; 4.5 AEROSOL RESPIRATORY (INHALATION) at 09:00

## 2017-12-05 RX ADMIN — ALBUTEROL 2 PUFF(S): 90 AEROSOL, METERED ORAL at 03:58

## 2017-12-05 RX ADMIN — FENTANYL CITRATE 100 MICROGRAM(S): 50 INJECTION INTRAVENOUS at 14:00

## 2017-12-05 RX ADMIN — FENTANYL CITRATE 1 PATCH: 50 INJECTION INTRAVENOUS at 12:05

## 2017-12-05 RX ADMIN — CHLORHEXIDINE GLUCONATE 1 APPLICATION(S): 213 SOLUTION TOPICAL at 12:04

## 2017-12-05 RX ADMIN — Medication 650 MILLIGRAM(S): at 19:05

## 2017-12-05 RX ADMIN — BUDESONIDE AND FORMOTEROL FUMARATE DIHYDRATE 2 PUFF(S): 160; 4.5 AEROSOL RESPIRATORY (INHALATION) at 21:13

## 2017-12-05 RX ADMIN — Medication 7.03 MICROGRAM(S)/KG/MIN: at 08:38

## 2017-12-05 RX ADMIN — PROPOFOL 4.5 MICROGRAM(S)/KG/MIN: 10 INJECTION, EMULSION INTRAVENOUS at 22:32

## 2017-12-05 NOTE — PROGRESS NOTE ADULT - PROBLEM SELECTOR PLAN 6
controlled.  11/10 stable  11/11 stable  11/12 stable  11/18 off meds. not on pressor  11/19 stable without pressors  11/21: controlled  11/30: off an on pressors: currently off  12/2 on small dose of pressor. could be 2/2 propofol  12/3 on pressor  12/05: remains on and off pressors

## 2017-12-05 NOTE — PROGRESS NOTE ADULT - PROBLEM SELECTOR PLAN 4
11/15: Wheezing has increased: started on IV steroids high dose by thoracic team: Would suggest to decrease to 20 mg three times a day !  11/16: decrease steroids today to 20 mg three times day  11/17: try to decrease steroids in AM  11/18 Duoneb. intubated for acute respiratory distress with hypoxia  11/19 KIARA. intubated and on ventilator support  11/20: cont vent support: start weaning as tolerated!  11/21: would decrease  his steroids to 30 mg a day from tomorrow  11/22: DECREASE STEROIDS: HE WHEEZES WHEN THE STEROIDS ARE TAPERED: BUT NEED TO BE LOWERED NOW AND WATCH!!  11/23: on 40 mg of prednisone: try to decrease and taper  11/24: prednisone decreased to 30 ,g a day !!  11/25: on 30 mg a day now:  11/27: on steroids: for repeat ct scan chest today  11/28: ct reviewed with MICU attending  11/30: on bronchodilators and steroids: weaning trials  12/01:cont weaning trials as tolerated : new ct noted:not much reexpansion of ptx!!  12/2 on bronchodilators and steroid. Wean as per MICU  12/3 continue current treatment  12/04: cont BD and steroids  12/05: on low dose steroids now: at 10 mg per day !!

## 2017-12-05 NOTE — PROGRESS NOTE ADULT - ASSESSMENT
47M with hx of sarcoidosis, cerebrovascular infarct with PFO on echo and paroxysmal afib in September started on eliquis c/b recurrent hemoptysis (Eliquis d/c'd on 9/20, then restarted between 10/23-27 after hemoptysis improved), d/c'd to rehab then returned 11/9 for recurrent hemoptysis requiring intubation and CTICU admission for management of hemoptysis including IR embolization (11/16) and serial bronch (11/17 and 11/20) in CTICU with no active bleeding visualized, now remains intubated with difficulty weaning vent as during CPAP trials pt fails to initiate breaths, unclear etiology. Transferred to MICU from CTICU for continued vent management and attempted weaning. Bedside trach placed 11/24 c/b episode of hypoxia and bleeding around trach site after suctioning, resolved after surgicel and pressure. Has been febrile intermittently, though improving, grew Klebsiella on urine cx, blood cx with staph epi. Now with recurrent bloody secretions from trach s/p rpt bronch 11/30, spiked another temp 12/1/17 (102.7) and repeat cultures drawn. Also with DAVID collapse with concern for pneumothorax vs broncho-pleural fistula.     # Neuro:  - pt with increased agitation and discordant breathing on vent w/ high peak pressures  - now sedated with propofol and valium and without evid of distress on vent    #Cardiovascular:  -has been intermittently on pressors, currently on small amount levo to maintain MAP > 65.    #Respiratory:  -Trach placed 11/24  -L upper anterior pigtail fell out yest, was going to be DC'd anyway given improvement and so was not replaced.   -Continue bronchodilators and pulmonary toilet  -prednisone decrease to 10mg today  -Did well on CPAP yesterday, cont aggressive weaning  -Rpt CXR this morning as pigtail clamped 12/2  -Bloody secretions from trach initially, now improving  -CT neck from 12/1 with ?vessel irregularity on left adjacent to trach; small branch off thyroid artery (?pseudoaneruysm)     #GI:  -on tube feeds, will need evaluation for PEG (d/w GI)    #Renal:  -chronically elevated bicarb likely 2/2 chronic hypercarbia in setting of hypoventilation  -Monitor I's and O's    #Heme:  -Yest pt had a drop in hemoglobin from baseline 7-8 to 6, unclear etiology, given 1u pRBC   -hgb remains stable since.     #ID:  -Aspergilloma dx 9/24/17, on voriconazole x 6 weeks per ID recommendations.   -Recommend continuing voriconazole given hemoptysis recurred after was stopped, possible related to inflammation 2/2 aspergilloma and possible continued infection.  -last (+) blood cx w/ GPC on 11/26 now (-)   -Afebrile and improved, recent cultures all negative, d/c abx today    #Endocrine:   -On tube feeds, increased per dietary's recommendations FS q6h, ISS,    #DVT PPX: lovenox 47M with hx of sarcoidosis, cerebrovascular infarct with PFO on echo and paroxysmal afib in September started on eliquis c/b recurrent hemoptysis (Eliquis d/c'd on 9/20, then restarted between 10/23-27 after hemoptysis improved), d/c'd to rehab then returned 11/9 for recurrent hemoptysis requiring intubation and CTICU admission for management of hemoptysis including IR embolization (11/16) and serial bronch (11/17 and 11/20) in CTICU with no active bleeding visualized, now remains intubated with difficulty weaning vent as during CPAP trials pt fails to initiate breaths, unclear etiology. Transferred to MICU from CTICU for continued vent management and attempted weaning. Bedside trach placed 11/24 c/b episode of hypoxia and bleeding around trach site after suctioning, resolved after surgicel and pressure. Has been febrile intermittently, though improving, grew Klebsiella on urine cx, blood cx with staph epi. Now with recurrent bloody secretions from trach s/p rpt bronch 11/30, spiked another temp 12/1/17 (102.7) and repeat cultures drawn. Also with DAVID collapse with concern for pneumothorax vs broncho-pleural fistula.     # Neuro:  - pt with increased agitation and discordant breathing on vent w/ high peak pressures  - now sedated with propofol and valium and without evid of distress on vent    #Cardiovascular:  - with vasoplegic shock requiring levo to maintain maps > 65  - plan to wean down proprofol and attempt to wean off levo  - DVT study yest was (-). No indication for AC at this time.     #Respiratory:  -incr'd agitation overnight, sedated with prop   -Trach placed 11/24  -L upper anterior pigtail fell out yest, was going to be DC'd anyway given improvement and so was not replaced.   -Continue bronchodilators and pulmonary toilet  -c/w prednisone 10mg  -Rpt CXR this morning as pigtail clamped 12/2  -Bloody secretions from trach initially, now improving  -CT neck from 12/1 with ?vessel irregularity on left adjacent to trach; small branch off thyroid artery (?pseudoaneruysm)     #GI:  -on tube feeds, will need evaluation for PEG (d/w GI)    #Renal:  -chronically elevated bicarb likely 2/2 chronic hypercarbia in setting of hypoventilation  -Monitor I's and O's    #Heme:  -Yest pt had a drop in hemoglobin from baseline 7-8 to 6, unclear etiology, given 1u pRBC   -hgb remains stable since.     #ID:  -Aspergilloma dx 9/24/17, on voriconazole x 6 weeks per ID recommendations.   -Recommend continuing voriconazole given hemoptysis recurred after was stopped, possible related to inflammation 2/2 aspergilloma and possible continued infection.  -last (+) blood cx w/ GPC on 11/26 now (-)   -Afebrile and improved, recent cultures all negative, d/c abx today    #Endocrine:   -On tube feeds, increased per dietary's recommendations FS q6h, ISS,    #DVT PPX: lovenox 47M with hx of sarcoidosis, cerebrovascular infarct with PFO on echo and paroxysmal afib in September started on eliquis c/b recurrent hemoptysis (Eliquis d/c'd on 9/20, then restarted between 10/23-27 after hemoptysis improved), d/c'd to rehab then returned 11/9 for recurrent hemoptysis requiring intubation and CTICU admission for management of hemoptysis including IR embolization (11/16) and serial bronch (11/17 and 11/20) in CTICU with no active bleeding visualized, now remains intubated with difficulty weaning vent as during CPAP trials pt fails to initiate breaths, unclear etiology. Transferred to MICU from CTICU for continued vent management and attempted weaning. Bedside trach placed 11/24 c/b episode of hypoxia and bleeding around trach site after suctioning, resolved after surgicel and pressure. Has been febrile intermittently, though improving, grew Klebsiella on urine cx, blood cx with staph epi. Now with recurrent bloody secretions from trach s/p rpt bronch 11/30, spiked another temp 12/1/17 (102.7) and repeat cultures drawn. Also with DAVID collapse with concern for pneumothorax vs broncho-pleural fistula.     # Neuro:  - pt with increased agitation and discordant breathing on vent w/ high peak pressures  - now sedated with propofol and valium and without evid of distress on vent    #Cardiovascular:  - with vasoplegic shock requiring levo to maintain maps > 65  - plan to wean down proprofol and attempt to wean off levo  - DVT study yest was (-). No indication for AC at this time.     #Respiratory:  -incr'd agitation overnight, sedated with prop   -Trach placed 11/24  -L upper anterior pigtail fell out yest, was going to be DC'd anyway given improvement and so was not replaced.   -Continue bronchodilators and pulmonary toilet  -c/w prednisone 10mg  -Rpt CXR this morning as pigtail clamped 12/2  -Bloody secretions from trach initially, now improving  -CT neck from 12/1 with ?vessel irregularity on left adjacent to trach; small branch off thyroid artery (?pseudoaneruysm)     #GI:  -on tube feeds, will need evaluation for PEG (d/w GI)    #Renal:  -chronically elevated bicarb likely 2/2 chronic hypercarbia in setting of hypoventilation  -Monitor I's and O's    #Heme:  -hgb remains stable   - no DVT on LE US yest    #ID:  -Aspergilloma dx 9/24/17, on voriconazole x 6 weeks per ID recommendations.   -Recommend continuing voriconazole given hemoptysis recurred after was stopped, possible related to inflammation 2/2 aspergilloma and possible continued infection.  -last (+) blood cx w/ GPC on 11/26 now (-)   -Afebrile and improved, recent cultures all negative  -WBC incr'd this AM at 21, now at 12  - can hold off on additional abx    #Endocrine:   -On tube feeds, increased per dietary's recommendations FS q6h, ISS,    #DVT PPX: lovenox

## 2017-12-05 NOTE — PROGRESS NOTE ADULT - ATTENDING COMMENTS
I have seen and examined the patient. I agree with the above history, physical exam, and plan of care except for as detailed below.    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 Patient has peresistent pneumo concerning for APF/BPF s/p chest tube now out with residual pneumo. Peak pressures currently stable, however patient failed weaning trial likely due to weakness. LE doppler negative for DVT. Hypotension likely due to meds.    Continue Vori. Wean levo as tolerated. IF able to d/c levo would diurese goal net negative 500cc-1L as patient is edematous which is likely worsening respiratory compliance. Repeat CBC as unknown etiology for new leukocytosis. Clinically patient is unchanged. If WBC count remains elevated will recall ID and consider restarting abx.

## 2017-12-05 NOTE — PROGRESS NOTE ADULT - SUBJECTIVE AND OBJECTIVE BOX
CHIEF COMPLAINT:  Patient is a 47y old  Male who presents with a chief complaint of hemoptysis (09 Nov 2017 00:32)    HPI:  47 year old male with a PMHx of sarcoidosis, HTN, asthma, and COPD presents to the ED with severe leg spasms and headache leading to a presyncopal episode, found to have a significant abnormal EKG and moderate inspiratory and expiratory wheezing in the setting of Entero/Rhino virus, admitted to tele for Near Syncope, r/o ACS and COPD exacerbation.  Hospital course complicated by stroke and PAF- AC on hold due to hemoptysis; 9/21-FB/BAL done by Dr. Arias. Intraop: Bleeding from DAVID (non-lingular)-Recom IR to embolize; 9/22 CT angio done per IR Lobko request.  Formal PFTS and ask Card to fix PFO now to decrease blood flow; 9/24- Developed worsening hemoptysis. Emergently kristel to OR and had  Left thoractomy DAVID. Brought to CTICU.  9/24: 1400ml EBL in OR; 10 u PRBCs;9/25-Brought back to OR for bleeding. Had Reop Thoracotomy, evac left hemothorax. CTI- DAVID space despite -40 Sxn, MEYER, L Hemiparesis (CVA) better w walking; Patient post op with prolongd air leak shila ptx, bedside pleurodesis done.  Developed SIRS after pleurodesis-> CTICU; On pressors. Pt. clinically improved in CTICU. Maintained on Steroids, weaned down to Solumedrol BID. Off pressors and transitioned to Midodrine. Still with thrombocytopenia but no bleeding. Continued on Eliquis but no ASA for h/o CVA. Neuro deficits improving.  Air leak resolved on 10/27. 10/30 Chest tube removed, cxr stable.  Steriods to po taper.  Patient stable for discharge.  11/2-Pt ambulating w PT frequently. Feels well. Pain controlled. All labs and vital signs stable. Wound healing. Arnold po steroid taper. To complete Voriconazole on Nov 4th. CXRs and reports reviewed by and cleared by Dr. Caldera. Pt. to fu for PFO closure as output. Still requires intermittent oxygen. Cleared for discharge by Dr. Caldera to acute rehab.    Patient presented to [place] [facility] after rehab send patient with hemoptysis, patient had 2 episodes of hemoptysis, less than 10cc in total for 24 hours.  Patient states it happens when he coughs, blood tinged sputum.  Patient otherwise doing well in rehab. (09 Nov 2017 00:32)    Interval Events:        REVIEW OF SYSTEMS:  Constitutional: [ ] negative [ ] fevers [ ] chills [ ] weight loss [ ] weight gain  HEENT: [ ] negative [ ] dry eyes [ ] eye irritation [ ] postnasal drip [ ] nasal congestion  CV: [ ] negative  [ ] chest pain [ ] orthopnea [ ] palpitations [ ] murmur  Resp: [ ] negative [ ] cough [ ] shortness of breath [ ] dyspnea [ ] wheezing [ ] sputum [ ] hemoptysis  GI: [ ] negative [ ] nausea [ ] vomiting [ ] diarrhea [ ] constipation [ ] abd pain [ ] dysphagia   : [ ] negative [ ] dysuria [ ] nocturia [ ] hematuria [ ] increased urinary frequency  Musculoskeletal: [ ] negative [ ] back pain [ ] myalgias [ ] arthralgias [ ] fracture  Skin: [ ] negative [ ] rash [ ] itch  Neurological: [ ] negative [ ] headache [ ] dizziness [ ] syncope [ ] weakness [ ] numbness  Psychiatric: [ ] negative [ ] anxiety [ ] depression  Endocrine: [ ] negative [ ] diabetes [ ] thyroid problem  Hematologic/Lymphatic: [ ] negative [ ] anemia [ ] bleeding problem  Allergic/Immunologic: [ ] negative [ ] itchy eyes [ ] nasal discharge [ ] hives [ ] angioedema  [ ] All other systems negative  [ ] Unable to assess ROS because ________    OBJECTIVE:  ICU Vital Signs Last 24 Hrs  T(C): 37.9 (05 Dec 2017 04:00), Max: 37.9 (05 Dec 2017 04:00)  T(F): 100.2 (05 Dec 2017 04:00), Max: 100.2 (05 Dec 2017 04:00)  HR: 112 (05 Dec 2017 06:00) (86 - 138)  BP: 117/87 (05 Dec 2017 06:00) (70/41 - 163/60)  BP(mean): 93 (05 Dec 2017 06:00) (45 - 121)  ABP: --  ABP(mean): --  RR: 13 (05 Dec 2017 06:00) (10 - 31)  SpO2: 100% (05 Dec 2017 06:00) (86% - 100%)    Mode: AC/ CMV (Assist Control/ Continuous Mandatory Ventilation), RR (machine): 12, TV (machine): 500, FiO2: 50, PEEP: 5, MAP: 14, PIP: 40    12-03 @ 07:01  -  12-04 @ 07:00  --------------------------------------------------------  IN: 3257 mL / OUT: 605 mL / NET: 2652 mL    12-04 @ 07:01  -  12-05 @ 06:53  --------------------------------------------------------  IN: 853.5 mL / OUT: 2600 mL / NET: -1746.5 mL      CAPILLARY BLOOD GLUCOSE      POCT Blood Glucose.: 98 mg/dL (05 Dec 2017 05:22)      PHYSICAL EXAM:  General: NAD, well-developed  Eyes: EOMI, PERRLA, conjunctiva and sclera clear  Neck: Supple, No JVD  Chest/Lung: Clear to auscultation bilaterally; No wheezes  Heart: Regular rate and rhythm; No murmurs, rubs, or gallops. Capillary refill WNL  Abdome: Soft, Nontender, Nondistended; Bowel sounds present  Extremities: No lower extremity edema.   Psych: AAOx3  Neurology: non-focal, strength and sensation grossly intact UE and LE BL. No spinal TTP  Skin: No rashes or lesions    LINES:    HOSPITAL MEDICATIONS:  enoxaparin Injectable 40 milliGRAM(s) SubCutaneous daily    voriconazole IVPB 300 milliGRAM(s) IV Intermittent every 12 hours    norepinephrine Infusion 0.05 MICROgram(s)/kG/Min IV Continuous <Continuous>    insulin lispro (HumaLOG) corrective regimen sliding scale   SubCutaneous every 6 hours  predniSONE   Tablet 10 milliGRAM(s) Oral daily    ALBUTerol    90 MICROgram(s) HFA Inhaler 2 Puff(s) Inhalation every 6 hours  buDESOnide 160 MICROgram(s)/formoterol 4.5 MICROgram(s) Inhaler 2 Puff(s) Inhalation two times a day  ipratropium 17 MICROgram(s) HFA Inhaler 1 Puff(s) Inhalation every 6 hours    acetaminophen   Tablet 650 milliGRAM(s) Oral every 6 hours PRN  diazepam    Tablet 2 milliGRAM(s) Oral two times a day  propofol Infusion 10 MICROgram(s)/kG/Min IV Continuous <Continuous>  valproic  acid Syrup 500 milliGRAM(s) Oral two times a day    polyethylene glycol 3350 17 Gram(s) Oral at bedtime  senna Syrup 10 milliLiter(s) Oral at bedtime    ferrous    sulfate Liquid 300 milliGRAM(s) Enteral Tube daily  folic acid 1 milliGRAM(s) Oral daily    chlorhexidine 0.12% Liquid 15 milliLiter(s) Swish and Spit two times a day  chlorhexidine 4% Liquid 1 Application(s) Topical daily      LABS:                        8.3    21.13 )-----------( 217      ( 05 Dec 2017 03:55 )             27.1     Hgb Trend: 8.3<--, 7.6<--, 7.6<--, 6.4<--, 7.5<--  12-05    141  |  93<L>  |  10  ----------------------------<  96  4.2   |  40<H>  |  0.77    Ca    8.8      05 Dec 2017 03:55  Phos  4.1     12-05  Mg     2.0     12-05      Creatinine Trend: 0.77<--, 0.61<--, 0.59<--, 0.79<--, 0.74<--, 0.70<--  PT/INR - ( 05 Dec 2017 03:55 )   PT: 14.3 SEC;   INR: 1.27          PTT - ( 05 Dec 2017 03:55 )  PTT:28.1 SEC          MICROBIOLOGY:     RADIOLOGY:  [ ] Reviewed and interpreted by me    EKG: CHIEF COMPLAINT:  Patient is a 47y old  Male who presents with a chief complaint of hemoptysis (09 Nov 2017 00:32)    HPI:  47 year old male with a PMHx of sarcoidosis, HTN, asthma, and COPD presents to the ED with severe leg spasms and headache leading to a presyncopal episode, found to have a significant abnormal EKG and moderate inspiratory and expiratory wheezing in the setting of Entero/Rhino virus, admitted to tele for Near Syncope, r/o ACS and COPD exacerbation.  Hospital course complicated by stroke and PAF- AC on hold due to hemoptysis; 9/21-FB/BAL done by Dr. Arias. Intraop: Bleeding from DAVID (non-lingular)-Recom IR to embolize; 9/22 CT angio done per IR Lobko request.  Formal PFTS and ask Card to fix PFO now to decrease blood flow; 9/24- Developed worsening hemoptysis. Emergently kristel to OR and had  Left thoractomy DAVID. Brought to CTICU.  9/24: 1400ml EBL in OR; 10 u PRBCs;9/25-Brought back to OR for bleeding. Had Reop Thoracotomy, evac left hemothorax. CTI- DAVID space despite -40 Sxn, MEYER, L Hemiparesis (CVA) better w walking; Patient post op with prolongd air leak shila ptx, bedside pleurodesis done.  Developed SIRS after pleurodesis-> CTICU; On pressors. Pt. clinically improved in CTICU. Maintained on Steroids, weaned down to Solumedrol BID. Off pressors and transitioned to Midodrine. Still with thrombocytopenia but no bleeding. Continued on Eliquis but no ASA for h/o CVA. Neuro deficits improving.  Air leak resolved on 10/27. 10/30 Chest tube removed, cxr stable.  Steriods to po taper.  Patient stable for discharge.  11/2-Pt ambulating w PT frequently. Feels well. Pain controlled. All labs and vital signs stable. Wound healing. Arnold po steroid taper. To complete Voriconazole on Nov 4th. CXRs and reports reviewed by and cleared by Dr. Caldera. Pt. to fu for PFO closure as output. Still requires intermittent oxygen. Cleared for discharge by Dr. Caldera to acute rehab.    Patient presented to [place] ER after rehab send patient with hemoptysis, patient had 2 episodes of hemoptysis, less than 10cc in total for 24 hours.  Patient states it happens when he coughs, blood tinged sputum.  Patient otherwise doing well in rehab. (09 Nov 2017 00:32)    Interval Events:    Pt was discordant on vent yest, was restarted on propofol, had bronch during some mucus plugs were removed, CT chest performed without significant interval changes.     REVIEW OF SYSTEMS:  Constitutional: [ ] negative [ ] fevers [ ] chills [ ] weight loss [ ] weight gain  HEENT: [ ] negative [ ] dry eyes [ ] eye irritation [ ] postnasal drip [ ] nasal congestion  CV: [ ] negative  [ ] chest pain [ ] orthopnea [ ] palpitations [ ] murmur  Resp: [ ] negative [ ] cough [ ] shortness of breath [ ] dyspnea [ ] wheezing [ ] sputum [ ] hemoptysis  GI: [ ] negative [ ] nausea [ ] vomiting [ ] diarrhea [ ] constipation [ ] abd pain [ ] dysphagia   : [ ] negative [ ] dysuria [ ] nocturia [ ] hematuria [ ] increased urinary frequency  Musculoskeletal: [ ] negative [ ] back pain [ ] myalgias [ ] arthralgias [ ] fracture  Skin: [ ] negative [ ] rash [ ] itch  Neurological: [ ] negative [ ] headache [ ] dizziness [ ] syncope [ ] weakness [ ] numbness  Psychiatric: [ ] negative [ ] anxiety [ ] depression  Endocrine: [ ] negative [ ] diabetes [ ] thyroid problem  Hematologic/Lymphatic: [ ] negative [ ] anemia [ ] bleeding problem  Allergic/Immunologic: [ ] negative [ ] itchy eyes [ ] nasal discharge [ ] hives [ ] angioedema  [ ] All other systems negative  [ ] Unable to assess ROS because ________    OBJECTIVE:  ICU Vital Signs Last 24 Hrs  T(C): 37.9 (05 Dec 2017 04:00), Max: 37.9 (05 Dec 2017 04:00)  T(F): 100.2 (05 Dec 2017 04:00), Max: 100.2 (05 Dec 2017 04:00)  HR: 112 (05 Dec 2017 06:00) (86 - 138)  BP: 117/87 (05 Dec 2017 06:00) (70/41 - 163/60)  BP(mean): 93 (05 Dec 2017 06:00) (45 - 121)  ABP: --  ABP(mean): --  RR: 13 (05 Dec 2017 06:00) (10 - 31)  SpO2: 100% (05 Dec 2017 06:00) (86% - 100%)    Mode: AC/ CMV (Assist Control/ Continuous Mandatory Ventilation), RR (machine): 12, TV (machine): 500, FiO2: 50, PEEP: 5, MAP: 14, PIP: 40    12-03 @ 07:01  -  12-04 @ 07:00  --------------------------------------------------------  IN: 3257 mL / OUT: 605 mL / NET: 2652 mL    12-04 @ 07:01  -  12-05 @ 06:53  --------------------------------------------------------  IN: 853.5 mL / OUT: 2600 mL / NET: -1746.5 mL      CAPILLARY BLOOD GLUCOSE      POCT Blood Glucose.: 98 mg/dL (05 Dec 2017 05:22)      PHYSICAL EXAM:  General: NAD, well-developed  Eyes: EOMI, PERRLA, conjunctiva and sclera clear  Neck: Supple, No JVD  Chest/Lung: Clear to auscultation bilaterally; No wheezes  Heart: Regular rate and rhythm; No murmurs, rubs, or gallops. Capillary refill WNL  Abdome: Soft, Nontender, Nondistended; Bowel sounds present  Extremities: No lower extremity edema.   Psych: AAOx3  Neurology: non-focal, strength and sensation grossly intact UE and LE BL. No spinal TTP  Skin: No rashes or lesions    LINES:    HOSPITAL MEDICATIONS:  enoxaparin Injectable 40 milliGRAM(s) SubCutaneous daily    voriconazole IVPB 300 milliGRAM(s) IV Intermittent every 12 hours    norepinephrine Infusion 0.05 MICROgram(s)/kG/Min IV Continuous <Continuous>    insulin lispro (HumaLOG) corrective regimen sliding scale   SubCutaneous every 6 hours  predniSONE   Tablet 10 milliGRAM(s) Oral daily    ALBUTerol    90 MICROgram(s) HFA Inhaler 2 Puff(s) Inhalation every 6 hours  buDESOnide 160 MICROgram(s)/formoterol 4.5 MICROgram(s) Inhaler 2 Puff(s) Inhalation two times a day  ipratropium 17 MICROgram(s) HFA Inhaler 1 Puff(s) Inhalation every 6 hours    acetaminophen   Tablet 650 milliGRAM(s) Oral every 6 hours PRN  diazepam    Tablet 2 milliGRAM(s) Oral two times a day  propofol Infusion 10 MICROgram(s)/kG/Min IV Continuous <Continuous>  valproic  acid Syrup 500 milliGRAM(s) Oral two times a day    polyethylene glycol 3350 17 Gram(s) Oral at bedtime  senna Syrup 10 milliLiter(s) Oral at bedtime    ferrous    sulfate Liquid 300 milliGRAM(s) Enteral Tube daily  folic acid 1 milliGRAM(s) Oral daily    chlorhexidine 0.12% Liquid 15 milliLiter(s) Swish and Spit two times a day  chlorhexidine 4% Liquid 1 Application(s) Topical daily      LABS:                        8.3    21.13 )-----------( 217      ( 05 Dec 2017 03:55 )             27.1     Hgb Trend: 8.3<--, 7.6<--, 7.6<--, 6.4<--, 7.5<--  12-05    141  |  93<L>  |  10  ----------------------------<  96  4.2   |  40<H>  |  0.77    Ca    8.8      05 Dec 2017 03:55  Phos  4.1     12-05  Mg     2.0     12-05      Creatinine Trend: 0.77<--, 0.61<--, 0.59<--, 0.79<--, 0.74<--, 0.70<--  PT/INR - ( 05 Dec 2017 03:55 )   PT: 14.3 SEC;   INR: 1.27          PTT - ( 05 Dec 2017 03:55 )  PTT:28.1 SEC          MICROBIOLOGY:     RADIOLOGY:  [ ] Reviewed and interpreted by me    EKG: CHIEF COMPLAINT:  Patient is a 47y old  Male who presents with a chief complaint of hemoptysis (09 Nov 2017 00:32)    HPI:  47 year old male with a PMHx of sarcoidosis, HTN, asthma, and COPD presents to the ED with severe leg spasms and headache leading to a presyncopal episode, found to have a significant abnormal EKG and moderate inspiratory and expiratory wheezing in the setting of Entero/Rhino virus, admitted to tele for Near Syncope, r/o ACS and COPD exacerbation.  Hospital course complicated by stroke and PAF- AC on hold due to hemoptysis; 9/21-FB/BAL done by Dr. Arias. Intraop: Bleeding from DAVID (non-lingular)-Recom IR to embolize; 9/22 CT angio done per IR Lobko request.  Formal PFTS and ask Card to fix PFO now to decrease blood flow; 9/24- Developed worsening hemoptysis. Emergently kristel to OR and had  Left thoractomy DAVID. Brought to CTICU.  9/24: 1400ml EBL in OR; 10 u PRBCs;9/25-Brought back to OR for bleeding. Had Reop Thoracotomy, evac left hemothorax. CTI- DAVID space despite -40 Sxn, MEYER, L Hemiparesis (CVA) better w walking; Patient post op with prolongd air leak shila ptx, bedside pleurodesis done.  Developed SIRS after pleurodesis-> CTICU; On pressors. Pt. clinically improved in CTICU. Maintained on Steroids, weaned down to Solumedrol BID. Off pressors and transitioned to Midodrine. Still with thrombocytopenia but no bleeding. Continued on Eliquis but no ASA for h/o CVA. Neuro deficits improving.  Air leak resolved on 10/27. 10/30 Chest tube removed, cxr stable.  Steriods to po taper.  Patient stable for discharge.  11/2-Pt ambulating w PT frequently. Feels well. Pain controlled. All labs and vital signs stable. Wound healing. Arnold po steroid taper. To complete Voriconazole on Nov 4th. CXRs and reports reviewed by and cleared by Dr. Caldera. Pt. to fu for PFO closure as output. Still requires intermittent oxygen. Cleared for discharge by Dr. Caldera to acute rehab.    Patient presented to [place] ER after rehab send patient with hemoptysis, patient had 2 episodes of hemoptysis, less than 10cc in total for 24 hours.  Patient states it happens when he coughs, blood tinged sputum.  Patient otherwise doing well in rehab. (09 Nov 2017 00:32)    Interval Events:    Pt was discordant on vent yest, was restarted on propofol, had bronch during some mucus plugs were removed, CT chest performed without significant interval changes.     REVIEW OF SYSTEMS limited by sedation  Denies pain, some discomfort with trach    OBJECTIVE:  ICU Vital Signs Last 24 Hrs  T(C): 37.9 (05 Dec 2017 04:00), Max: 37.9 (05 Dec 2017 04:00)  T(F): 100.2 (05 Dec 2017 04:00), Max: 100.2 (05 Dec 2017 04:00)  HR: 112 (05 Dec 2017 06:00) (86 - 138)  BP: 117/87 (05 Dec 2017 06:00) (70/41 - 163/60)  BP(mean): 93 (05 Dec 2017 06:00) (45 - 121)  ABP: --  ABP(mean): --  RR: 13 (05 Dec 2017 06:00) (10 - 31)  SpO2: 100% (05 Dec 2017 06:00) (86% - 100%)    Mode: AC/ CMV (Assist Control/ Continuous Mandatory Ventilation), RR (machine): 12, TV (machine): 500, FiO2: 50, PEEP: 5, MAP: 14, PIP: 40    12-03 @ 07:01  -  12-04 @ 07:00  --------------------------------------------------------  IN: 3257 mL / OUT: 605 mL / NET: 2652 mL    12-04 @ 07:01  -  12-05 @ 06:53  --------------------------------------------------------  IN: 853.5 mL / OUT: 2600 mL / NET: -1746.5 mL    CAPILLARY BLOOD GLUCOSE    POCT Blood Glucose.: 98 mg/dL (05 Dec 2017 05:22)    PHYSICAL EXAM:  General: NAD, well-developed  Eyes: EOMI, PERRLA, conjunctiva and sclera clear  Neck: Supple, No JVD  Chest/Lung: Clear to auscultation bilaterally on ant exam; No wheezes  Heart: Regular rate and rhythm; No murmurs, rubs, or gallops. Capillary refill WNL  Abdome: Soft, Nontender, Nondistended; Bowel sounds present  Extremities: (+) anasarca  Psych: AAOx3  Neurology: non-focal, follows commands, able to move all extrems, able to communicate.   Skin: No rashes or lesions    LINES:    HOSPITAL MEDICATIONS:  enoxaparin Injectable 40 milliGRAM(s) SubCutaneous daily    voriconazole IVPB 300 milliGRAM(s) IV Intermittent every 12 hours    norepinephrine Infusion 0.05 MICROgram(s)/kG/Min IV Continuous <Continuous>    insulin lispro (HumaLOG) corrective regimen sliding scale   SubCutaneous every 6 hours  predniSONE   Tablet 10 milliGRAM(s) Oral daily    ALBUTerol    90 MICROgram(s) HFA Inhaler 2 Puff(s) Inhalation every 6 hours  buDESOnide 160 MICROgram(s)/formoterol 4.5 MICROgram(s) Inhaler 2 Puff(s) Inhalation two times a day  ipratropium 17 MICROgram(s) HFA Inhaler 1 Puff(s) Inhalation every 6 hours    acetaminophen   Tablet 650 milliGRAM(s) Oral every 6 hours PRN  diazepam    Tablet 2 milliGRAM(s) Oral two times a day  propofol Infusion 10 MICROgram(s)/kG/Min IV Continuous <Continuous>  valproic  acid Syrup 500 milliGRAM(s) Oral two times a day    polyethylene glycol 3350 17 Gram(s) Oral at bedtime  senna Syrup 10 milliLiter(s) Oral at bedtime    ferrous    sulfate Liquid 300 milliGRAM(s) Enteral Tube daily  folic acid 1 milliGRAM(s) Oral daily    chlorhexidine 0.12% Liquid 15 milliLiter(s) Swish and Spit two times a day  chlorhexidine 4% Liquid 1 Application(s) Topical daily      LABS:                        8.3    21.13 )-----------( 217      ( 05 Dec 2017 03:55 )             27.1     Hgb Trend: 8.3<--, 7.6<--, 7.6<--, 6.4<--, 7.5<--  12-05    141  |  93<L>  |  10  ----------------------------<  96  4.2   |  40<H>  |  0.77    Ca    8.8      05 Dec 2017 03:55  Phos  4.1     12-05  Mg     2.0     12-05      Creatinine Trend: 0.77<--, 0.61<--, 0.59<--, 0.79<--, 0.74<--, 0.70<--  PT/INR - ( 05 Dec 2017 03:55 )   PT: 14.3 SEC;   INR: 1.27          PTT - ( 05 Dec 2017 03:55 )  PTT:28.1 SEC          MICROBIOLOGY:     RADIOLOGY:  [ ] Reviewed and interpreted by me    EKG:

## 2017-12-05 NOTE — PROGRESS NOTE ADULT - ASSESSMENT
47 year old with sarcoidosis and COPD admitted after recent andree surgery with recurrent hemoptysis.     This is liklely multifactorial- Cystic lung changes, sarcoid, ? superimposed infection many all be contributing.     Now, s/p trach in ICU. Recent staph epi bacteremia.     Continue voriconazole -through 12/28. Check weekly cmp    Monitor temp- if change in status resume vanco/ meropenem    Assess for DVT/PE    Repeat C diff.

## 2017-12-05 NOTE — PROGRESS NOTE ADULT - PROBLEM SELECTOR PLAN 2
intubated: try to titrate as tolerated!  11/18 on ventilator support. Pt is sedated and paralyzed. defer ventilator Management/ weaning per CTICU team.  11/19 still sedated and paralyzed. Plan to wean off Nimbex today per Primary RN. ABGs reviewed.  11/20:ff paralyzing agent! But sedated !!  11/21: awake and alert: responding to simple commands: weaning trials  11/22: currently sedated: ? trach now?  11/23: for trach in AM  11/24: trach today  11/25: S/P trach yesterday: chest x-ray noted: Cont full vent support and wean if tolerates  11/26: [id number] with trach and full mechanical ventilation: Discussed with MICU attending in detail: for rpt ct scan chest  11/28: rpt ct scan c hst reviewed: for possible chest tube on rt side  11/30: on full vent support with weaning trials  12/01:cont weaning trails as tolerated; prednisone dose lowered!!  12/2 on CPAP trial. Continue wean off as tolerate  12/3 CPAP as tolerated  12/04: cont cpap trial: chest tube is out: Likely has BPF on the left side:  12/05: new ct notred: no official report: to me there is no imrpovement in left upper lobe PTX

## 2017-12-05 NOTE — PROGRESS NOTE ADULT - SUBJECTIVE AND OBJECTIVE BOX
Patient is a 47y old  Male who presents with a chief complaint of hemoptysis (09 Nov 2017 00:32)    no DVT found  on full vent support  open eyes and responds     s/p blood transfusion   Any change in ROS:     MEDICATIONS  (STANDING):  ALBUTerol    90 MICROgram(s) HFA Inhaler 2 Puff(s) Inhalation every 6 hours  buDESOnide 160 MICROgram(s)/formoterol 4.5 MICROgram(s) Inhaler 2 Puff(s) Inhalation two times a day  chlorhexidine 0.12% Liquid 15 milliLiter(s) Swish and Spit two times a day  chlorhexidine 4% Liquid 1 Application(s) Topical daily  diazepam    Tablet 2 milliGRAM(s) Oral two times a day  enoxaparin Injectable 40 milliGRAM(s) SubCutaneous daily  ferrous    sulfate Liquid 300 milliGRAM(s) Enteral Tube daily  folic acid 1 milliGRAM(s) Oral daily  insulin lispro (HumaLOG) corrective regimen sliding scale   SubCutaneous every 6 hours  ipratropium 17 MICROgram(s) HFA Inhaler 1 Puff(s) Inhalation every 6 hours  norepinephrine Infusion 0.05 MICROgram(s)/kG/Min (7.031 mL/Hr) IV Continuous <Continuous>  pantoprazole  Injectable 40 milliGRAM(s) IV Push daily  polyethylene glycol 3350 17 Gram(s) Oral at bedtime  predniSONE   Tablet 10 milliGRAM(s) Oral daily  propofol Infusion 10 MICROgram(s)/kG/Min (4.5 mL/Hr) IV Continuous <Continuous>  senna Syrup 10 milliLiter(s) Oral at bedtime  valproic  acid Syrup 500 milliGRAM(s) Oral two times a day  voriconazole IVPB 300 milliGRAM(s) IV Intermittent every 12 hours    MEDICATIONS  (PRN):  acetaminophen   Tablet 650 milliGRAM(s) Oral every 6 hours PRN For Temp greater than 38 C (100.4 F)    Vital Signs Last 24 Hrs  T(C): 37.5 (05 Dec 2017 12:00), Max: 37.9 (05 Dec 2017 04:00)  T(F): 99.5 (05 Dec 2017 12:00), Max: 100.2 (05 Dec 2017 04:00)  HR: 100 (05 Dec 2017 12:00) (86 - 138)  BP: 125/83 (05 Dec 2017 12:00) (70/41 - 163/60)  BP(mean): 91 (05 Dec 2017 12:00) (45 - 121)  RR: 23 (05 Dec 2017 12:00) (10 - 31)  SpO2: 100% (05 Dec 2017 12:00) (86% - 100%)  Mode: AC/ CMV (Assist Control/ Continuous Mandatory Ventilation)  RR (machine): 8  TV (machine): 500  FiO2: 40  PEEP: 5  MAP: 10  PIP: 30    I&O's Summary    04 Dec 2017 07:01  -  05 Dec 2017 07:00  --------------------------------------------------------  IN: 853.5 mL / OUT: 2600 mL / NET: -1746.5 mL    05 Dec 2017 07:01  -  05 Dec 2017 12:41  --------------------------------------------------------  IN: 31.3 mL / OUT: 780 mL / NET: -748.7 mL          Physical Exam:   GENERAL: NAD, well-groomed, well-developed  HEENT: GIOVANI/   Atraumatic, Normocephalic  ENMT: No tonsillar erythema, exudates, or enlargement; Moist mucous membranes, Good dentition, No lesions  NECK: Supple, No JVD, Normal thyroid  CHEST/LUNG: Coarse breath sounds bilaterally   CVS: Regular rate and rhythm; No murmurs, rubs, or gallops  GI: : Soft, Nontender, Nondistended; Bowel sounds present  NERVOUS SYSTEM:  awake and responsive   EXTREMITIES:  2+ edema  LYMPH: No lymphadenopathy noted  SKIN: No rashes or lesions  ENDOCRINOLOGY: No Thyromegaly  PSYCH:calm    Labs:  ABG - ( 05 Dec 2017 08:32 )  pH: 7.41  /  pCO2: 69    /  pO2: 165   / HCO3: 41    / Base Excess: 18.0  /  SaO2: 99.5                            8.3    21.13 )-----------( 217      ( 05 Dec 2017 03:55 )             27.1                         7.6    8.16  )-----------( 170      ( 04 Dec 2017 03:30 )             23.7                         7.6    8.21  )-----------( 152      ( 03 Dec 2017 15:30 )             23.7                         6.4    8.46  )-----------( 151      ( 03 Dec 2017 02:30 )             20.6                         7.5    12.69 )-----------( 156      ( 02 Dec 2017 02:28 )             23.5     12-05    141  |  93<L>  |  10  ----------------------------<  96  4.2   |  40<H>  |  0.77  12-04    140  |  93<L>  |  14  ----------------------------<  110<H>  3.7   |  39<H>  |  0.61  12-03    143  |  96<L>  |  15  ----------------------------<  139<H>  3.6   |  37<H>  |  0.59  12-02    144  |  97<L>  |  12  ----------------------------<  140<H>  4.0   |  40<H>  |  0.79    Ca    8.8      05 Dec 2017 03:55  Ca    8.3<L>      04 Dec 2017 03:30  Phos  4.1     12-05  Phos  3.5     12-04  Mg     2.0     12-05  Mg     2.1     12-04    TPro  5.5<L>  /  Alb  2.3<L>  /  TBili  0.2  /  DBili  x   /  AST  16  /  ALT  11  /  AlkPhos  60  12-03  TPro  5.5<L>  /  Alb  2.3<L>  /  TBili  0.2  /  DBili  x   /  AST  18  /  ALT  11  /  AlkPhos  68  12-02    CAPILLARY BLOOD GLUCOSE      POCT Blood Glucose.: 110 mg/dL (05 Dec 2017 12:11)  POCT Blood Glucose.: 98 mg/dL (05 Dec 2017 05:22)  POCT Blood Glucose.: 100 mg/dL (05 Dec 2017 00:01)  POCT Blood Glucose.: 120 mg/dL (04 Dec 2017 17:51)        PT/INR - ( 05 Dec 2017 03:55 )   PT: 14.3 SEC;   INR: 1.27          PTT - ( 05 Dec 2017 03:55 )  PTT:28.1 SEC    Cultures:           Wound culture:                12-01 @ 06:18  Organism --  Culture w/ gram stain --  Specimen Source BLOOD PERIPHERAL    Wound culture:                11-29 @ 15:21  Organism --  Culture w/ gram stain --  Specimen Source BLOOD VENOUS    Wound culture:                11-29 @ 04:37  Organism --  Culture w/ gram stain --  Specimen Source BLOOD      Abscess culture:             12-01 @ 06:18  Organism --  Gram Stain --  Specimen Source BLOOD PERIPHERAL    Abscess culture:             11-29 @ 15:21  Organism --  Gram Stain --  Specimen Source BLOOD VENOUS    Abscess culture:             11-29 @ 04:37  Organism --  Gram Stain --  Specimen Source BLOOD        Tissue culture:           12-01 @ 06:18  Organism --  Gram Stain --  Specimen Source BLOOD PERIPHERAL    Tissue culture:           11-29 @ 15:21  Organism --  Gram Stain --  Specimen Source BLOOD VENOUS    Tissue culture:           11-29 @ 04:37  Organism --  Gram Stain --  Specimen Source BLOOD      Body Fluid Smear & Culture:                        12-01 @ 06:18  AFB Smear  --  Culture Acid Fast Body Fluid w/ Smear  --  Culture Acid Fast Smear Concentrated   --    Culture Results:     --  Specimen Source BLOOD PERIPHERAL    Body Fluid Smear & Culture:                        11-29 @ 15:21  AFB Smear  --  Culture Acid Fast Body Fluid w/ Smear  --  Culture Acid Fast Smear Concentrated   --    Culture Results:     --  Specimen Source BLOOD VENOUS    Body Fluid Smear & Culture:                        11-29 @ 04:37  AFB Smear  --  Culture Acid Fast Body Fluid w/ Smear  --  Culture Acid Fast Smear Concentrated   --    Culture Results:     --  Specimen Source BLOOD      < from: CT Chest No Cont (12.04.17 @ 21:51) >    ******PRELIMINARY REPORT******    ******PRELIMINARY REPORT******            EXAM:  CT CHEST        PROCEDURE DATE:  Dec  4 2017     ******PRELIMINARY REPORT******    ******PRELIMINARY REPORT******            INTERPRETATION:  Interval removal of left sided pigtail chest tube. Mild   interval decrease in size of loculated air in the left upper lobectomy   bed.            ******PRELIMINARY REPORT******    ******PRELIMINARY REPORT******              JULIA MCGINNIS M.D.; ATTENDING RADIOLOGIST    < end of copied text >          Studies  Chest X-RAY  CT SCAN Chest   Venous Dopplers: LE:   CT Abdomen  Others

## 2017-12-05 NOTE — PROGRESS NOTE ADULT - PROBLEM SELECTOR PLAN 3
11/10 quantify hemoptysis. no AC. stable  11/11 no more hemoptysis. off AC  11/12 resolved. off AC  11/13: today had hemoptysis again----->localize site---> IR embolization: left a message for thoracic team  11/14: DW Dr Caldera: for repeat bronchoscopy to localize the site of bleeding and need IR help to embolize the culprit vessel!  11/15: started on broad spectrum antibiotics and for bronchoscopy today  11/16: s/p IR emobolization done: monitor for more hemoptysis  11/17: s/p bronchsocopy: reportedly old blood seen in airways!  11/18 s/p bronchoscopy in ICU via ETT. old clot. no active bleeding  11/19 stable. no active bleeding per recent bronchoscopy  11/20; off AC at this time  11/22: resolved: remained off ac  11/23: remained off AC: has hx of PAF  11/24: remains off AC  11/27: no bleeding noted  11/30: no recent  hemoptysis  12/01:so far no more hemoptysis  12/2 resolved  12/3 resolved  12/05: no DVT , remains off AC

## 2017-12-05 NOTE — PROGRESS NOTE ADULT - SUBJECTIVE AND OBJECTIVE BOX
Follow Up:      Inverval History/ROS:Patient is a 47y old  Male who presents with a chief complaint of hemoptysis (09 Nov 2017 00:32)    Low grade temp.      Allergies    No Known Allergies    Intolerances        ANTIMICROBIALS:  voriconazole IVPB 300 every 12 hours      OTHER MEDS:  acetaminophen   Tablet 650 milliGRAM(s) Oral every 6 hours PRN  ALBUTerol    90 MICROgram(s) HFA Inhaler 2 Puff(s) Inhalation every 6 hours  buDESOnide 160 MICROgram(s)/formoterol 4.5 MICROgram(s) Inhaler 2 Puff(s) Inhalation two times a day  chlorhexidine 0.12% Liquid 15 milliLiter(s) Swish and Spit two times a day  chlorhexidine 4% Liquid 1 Application(s) Topical daily  diazepam    Tablet 2 milliGRAM(s) Oral two times a day  enoxaparin Injectable 40 milliGRAM(s) SubCutaneous daily  ferrous    sulfate Liquid 300 milliGRAM(s) Enteral Tube daily  folic acid 1 milliGRAM(s) Oral daily  insulin lispro (HumaLOG) corrective regimen sliding scale   SubCutaneous every 6 hours  ipratropium 17 MICROgram(s) HFA Inhaler 1 Puff(s) Inhalation every 6 hours  norepinephrine Infusion 0.05 MICROgram(s)/kG/Min IV Continuous <Continuous>  pantoprazole  Injectable 40 milliGRAM(s) IV Push daily  polyethylene glycol 3350 17 Gram(s) Oral at bedtime  predniSONE   Tablet 10 milliGRAM(s) Oral daily  propofol Infusion 10 MICROgram(s)/kG/Min IV Continuous <Continuous>  senna Syrup 10 milliLiter(s) Oral at bedtime  valproic  acid Syrup 500 milliGRAM(s) Oral two times a day      Vital Signs Last 24 Hrs  T(C): 37.5 (05 Dec 2017 12:00), Max: 37.9 (05 Dec 2017 04:00)  T(F): 99.5 (05 Dec 2017 12:00), Max: 100.2 (05 Dec 2017 04:00)  HR: 101 (05 Dec 2017 14:15) (92 - 138)  BP: 119/77 (05 Dec 2017 14:15) (70/41 - 163/60)  BP(mean): 87 (05 Dec 2017 14:15) (45 - 121)  RR: 33 (05 Dec 2017 14:15) (10 - 33)  SpO2: 99% (05 Dec 2017 14:15) (86% - 100%)    PHYSICAL EXAM:  General: x[ ] non-toxic  HEAD/EYES: [ ] PERRL [ ] white sclera [ ] icterus  ENT:  [ ] normal [x ] supple [ ] thrush [ ] pharyngeal exudate  Cardiovascular:   [ ] murmur [ x] normal [ ] PPM/AICD  Respiratory:  [x ] clear to ausculation bilaterally  GI:  [x ] soft, non-tender, normal bowel sounds  :  [ ] mock [ ] no CVA tenderness   Musculoskeletal:  [ ] no synovitis  Neurologic:  x[ ] non-focal exam   Skin:  [x ] no rash  Lymph: [ ] no lymphadenopathy  Psychiatric:  [x ] appropriate affect [ ] alert & oriented  Lines:  [ x] no phlebitis [ ] central line                                8.3    21.13 )-----------( 217      ( 05 Dec 2017 03:55 )             27.1       12-05    141  |  93<L>  |  10  ----------------------------<  96  4.2   |  40<H>  |  0.77    Ca    8.8      05 Dec 2017 03:55  Phos  4.1     12-05  Mg     2.0     12-05            MICROBIOLOGY:    RADIOLOGY:

## 2017-12-05 NOTE — PROGRESS NOTE ADULT - PROBLEM SELECTOR PLAN 1
now spiked to fever:  on zosyn, vancomycin, as well as voriconazole~  id FOLLOW UP  11/27: Cont antibiotics::  11/28: cont current treatment  11/30: continues to be on broad spectrum antibiotics:  12/01: antibiotics upgraded to meropenem :still febrile:  12/2 on Meropenem, Vanco, and Voriconazole T(F): 98.8 (02 Dec 2017 12:00), Max: 100.3 (02 Dec 2017 00:00)  12/3 afebrile for last 24 hours T(C): 37.1 (03 Dec 2017 12:00), Max: 37.1 (03 Dec 2017 12:00) on ABTs  12/04: antibiotics per ID: on voriconazole only  12/05: jump in his leucocytes as well low grae temperature: defer to primary team/ID

## 2017-12-05 NOTE — CHART NOTE - NSCHARTNOTEFT_GEN_A_CORE
Source:  nursing & EMR    Diet : NPO after midnight     Patient s/p PEG placement , was on EN @ GOAL rate , plan for resuming EN when PEG ready .       Current Weight: -103.7 kg on 12/5/17 ; Dry wt. - 75 kg     Pertinent Medications: MEDICATIONS  (STANDING):    ALBUTerol    90 MICROgram(s) HFA Inhaler 2 Puff(s) Inhalation every 6 hours  buDESOnide 160 MICROgram(s)/formoterol 4.5 MICROgram(s) Inhaler 2 Puff(s) Inhalation two times a day  chlorhexidine 0.12% Liquid 15 milliLiter(s) Swish and Spit two times a day  chlorhexidine 4% Liquid 1 Application(s) Topical daily  diazepam    Tablet 2 milliGRAM(s) Oral two times a day  enoxaparin Injectable 40 milliGRAM(s) SubCutaneous daily  ferrous    sulfate Liquid 300 milliGRAM(s) Enteral Tube daily  folic acid 1 milliGRAM(s) Oral daily  insulin lispro (HumaLOG) corrective regimen sliding scale   SubCutaneous every 6 hours  ipratropium 17 MICROgram(s) HFA Inhaler 1 Puff(s) Inhalation every 6 hours  norepinephrine Infusion 0.05 MICROgram(s)/kG/Min (7.031 mL/Hr) IV Continuous <Continuous>  pantoprazole  Injectable 40 milliGRAM(s) IV Push daily  polyethylene glycol 3350 17 Gram(s) Oral at bedtime  predniSONE   Tablet 10 milliGRAM(s) Oral daily  propofol Infusion 10 MICROgram(s)/kG/Min (4.5 mL/Hr) IV Continuous <Continuous>  senna Syrup 10 milliLiter(s) Oral at bedtime  valproic  acid Syrup 500 milliGRAM(s) Oral two times a day  voriconazole IVPB 300 milliGRAM(s) IV Intermittent every 12 hours    MEDICATIONS  (PRN):  acetaminophen   Tablet 650 milliGRAM(s) Oral every 6 hours PRN For Temp greater than 38 C (100.4 F)    Pertinent Labs:  12-05 Na141 mmol/L Glu 96 mg/dL K+ 4.2 mmol/L Cr  0.77 mg/dL BUN 10 mg/dL Phos 4.1 mg/dL Alb n/a   PAB n/a         Skin: R buttock wound         Recommend to resume EN - Jevity 1.2 @ 30 ml/hr to the goal of 70 ml/hr 24hrs daily     Monitoring and Evaluation:     1. Tolerance to diet prescription   2. weights   3. follow up per protocol

## 2017-12-06 LAB
BACTERIA BLD CULT: SIGNIFICANT CHANGE UP
BACTERIA BLD CULT: SIGNIFICANT CHANGE UP
BASOPHILS # BLD AUTO: 0.01 K/UL — SIGNIFICANT CHANGE UP (ref 0–0.2)
BASOPHILS NFR BLD AUTO: 0.1 % — SIGNIFICANT CHANGE UP (ref 0–2)
BUN SERPL-MCNC: 9 MG/DL — SIGNIFICANT CHANGE UP (ref 7–23)
CALCIUM SERPL-MCNC: 9 MG/DL — SIGNIFICANT CHANGE UP (ref 8.4–10.5)
CHLORIDE SERPL-SCNC: 96 MMOL/L — LOW (ref 98–107)
CO2 SERPL-SCNC: 45 MMOL/L — CRITICAL HIGH (ref 22–31)
CREAT SERPL-MCNC: 0.78 MG/DL — SIGNIFICANT CHANGE UP (ref 0.5–1.3)
EOSINOPHIL # BLD AUTO: 0.07 K/UL — SIGNIFICANT CHANGE UP (ref 0–0.5)
EOSINOPHIL NFR BLD AUTO: 0.7 % — SIGNIFICANT CHANGE UP (ref 0–6)
GLUCOSE BLDC GLUCOMTR-MCNC: 78 MG/DL — SIGNIFICANT CHANGE UP (ref 70–99)
GLUCOSE BLDC GLUCOMTR-MCNC: 81 MG/DL — SIGNIFICANT CHANGE UP (ref 70–99)
GLUCOSE BLDC GLUCOMTR-MCNC: 83 MG/DL — SIGNIFICANT CHANGE UP (ref 70–99)
GLUCOSE BLDC GLUCOMTR-MCNC: 86 MG/DL — SIGNIFICANT CHANGE UP (ref 70–99)
GLUCOSE SERPL-MCNC: 92 MG/DL — SIGNIFICANT CHANGE UP (ref 70–99)
HCT VFR BLD CALC: 27.7 % — LOW (ref 39–50)
HGB BLD-MCNC: 8.3 G/DL — LOW (ref 13–17)
IMM GRANULOCYTES # BLD AUTO: 0.06 # — SIGNIFICANT CHANGE UP
IMM GRANULOCYTES NFR BLD AUTO: 0.6 % — SIGNIFICANT CHANGE UP (ref 0–1.5)
LYMPHOCYTES # BLD AUTO: 1.03 K/UL — SIGNIFICANT CHANGE UP (ref 1–3.3)
LYMPHOCYTES # BLD AUTO: 10.8 % — LOW (ref 13–44)
MAGNESIUM SERPL-MCNC: 1.9 MG/DL — SIGNIFICANT CHANGE UP (ref 1.6–2.6)
MCHC RBC-ENTMCNC: 29.4 PG — SIGNIFICANT CHANGE UP (ref 27–34)
MCHC RBC-ENTMCNC: 30 % — LOW (ref 32–36)
MCV RBC AUTO: 98.2 FL — SIGNIFICANT CHANGE UP (ref 80–100)
MONOCYTES # BLD AUTO: 0.79 K/UL — SIGNIFICANT CHANGE UP (ref 0–0.9)
MONOCYTES NFR BLD AUTO: 8.3 % — SIGNIFICANT CHANGE UP (ref 2–14)
NEUTROPHILS # BLD AUTO: 7.55 K/UL — HIGH (ref 1.8–7.4)
NEUTROPHILS NFR BLD AUTO: 79.5 % — HIGH (ref 43–77)
NRBC # FLD: 0 — SIGNIFICANT CHANGE UP
PHOSPHATE SERPL-MCNC: 4 MG/DL — SIGNIFICANT CHANGE UP (ref 2.5–4.5)
PLATELET # BLD AUTO: 186 K/UL — SIGNIFICANT CHANGE UP (ref 150–400)
PMV BLD: 12.9 FL — SIGNIFICANT CHANGE UP (ref 7–13)
POTASSIUM SERPL-MCNC: 4.4 MMOL/L — SIGNIFICANT CHANGE UP (ref 3.5–5.3)
POTASSIUM SERPL-SCNC: 4.4 MMOL/L — SIGNIFICANT CHANGE UP (ref 3.5–5.3)
RBC # BLD: 2.82 M/UL — LOW (ref 4.2–5.8)
RBC # FLD: 15.7 % — HIGH (ref 10.3–14.5)
SODIUM SERPL-SCNC: 148 MMOL/L — HIGH (ref 135–145)
SPECIMEN SOURCE: SIGNIFICANT CHANGE UP
WBC # BLD: 9.51 K/UL — SIGNIFICANT CHANGE UP (ref 3.8–10.5)
WBC # FLD AUTO: 9.51 K/UL — SIGNIFICANT CHANGE UP (ref 3.8–10.5)

## 2017-12-06 PROCEDURE — 93971 EXTREMITY STUDY: CPT | Mod: 26

## 2017-12-06 PROCEDURE — 99291 CRITICAL CARE FIRST HOUR: CPT

## 2017-12-06 PROCEDURE — 99232 SBSQ HOSP IP/OBS MODERATE 35: CPT | Mod: GC

## 2017-12-06 RX ORDER — FUROSEMIDE 40 MG
40 TABLET ORAL ONCE
Qty: 0 | Refills: 0 | Status: COMPLETED | OUTPATIENT
Start: 2017-12-06 | End: 2017-12-06

## 2017-12-06 RX ORDER — FENTANYL CITRATE 50 UG/ML
1 INJECTION INTRAVENOUS
Qty: 0 | Refills: 0 | Status: DISCONTINUED | OUTPATIENT
Start: 2017-12-06 | End: 2017-12-12

## 2017-12-06 RX ORDER — ACETAMINOPHEN 500 MG
650 TABLET ORAL EVERY 6 HOURS
Qty: 0 | Refills: 0 | Status: DISCONTINUED | OUTPATIENT
Start: 2017-12-06 | End: 2017-12-08

## 2017-12-06 RX ORDER — VALPROIC ACID (AS SODIUM SALT) 250 MG/5ML
500 SOLUTION, ORAL ORAL EVERY 12 HOURS
Qty: 0 | Refills: 0 | Status: DISCONTINUED | OUTPATIENT
Start: 2017-12-06 | End: 2017-12-16

## 2017-12-06 RX ORDER — FUROSEMIDE 40 MG
40 TABLET ORAL ONCE
Qty: 0 | Refills: 0 | Status: DISCONTINUED | OUTPATIENT
Start: 2017-12-06 | End: 2017-12-06

## 2017-12-06 RX ORDER — DEXTROSE 50 % IN WATER 50 %
25 SYRINGE (ML) INTRAVENOUS ONCE
Qty: 0 | Refills: 0 | Status: COMPLETED | OUTPATIENT
Start: 2017-12-06 | End: 2017-12-06

## 2017-12-06 RX ADMIN — Medication 650 MILLIGRAM(S): at 20:45

## 2017-12-06 RX ADMIN — Medication 25 MILLILITER(S): at 06:18

## 2017-12-06 RX ADMIN — VORICONAZOLE 125 MILLIGRAM(S): 10 INJECTION, POWDER, LYOPHILIZED, FOR SOLUTION INTRAVENOUS at 11:14

## 2017-12-06 RX ADMIN — CHLORHEXIDINE GLUCONATE 1 APPLICATION(S): 213 SOLUTION TOPICAL at 11:39

## 2017-12-06 RX ADMIN — BUDESONIDE AND FORMOTEROL FUMARATE DIHYDRATE 2 PUFF(S): 160; 4.5 AEROSOL RESPIRATORY (INHALATION) at 21:10

## 2017-12-06 RX ADMIN — Medication 300 MILLIGRAM(S): at 14:12

## 2017-12-06 RX ADMIN — SENNA PLUS 10 MILLILITER(S): 8.6 TABLET ORAL at 23:37

## 2017-12-06 RX ADMIN — VORICONAZOLE 125 MILLIGRAM(S): 10 INJECTION, POWDER, LYOPHILIZED, FOR SOLUTION INTRAVENOUS at 23:37

## 2017-12-06 RX ADMIN — ALBUTEROL 2 PUFF(S): 90 AEROSOL, METERED ORAL at 03:33

## 2017-12-06 RX ADMIN — Medication 1 PUFF(S): at 15:27

## 2017-12-06 RX ADMIN — ENOXAPARIN SODIUM 40 MILLIGRAM(S): 100 INJECTION SUBCUTANEOUS at 11:39

## 2017-12-06 RX ADMIN — POLYETHYLENE GLYCOL 3350 17 GRAM(S): 17 POWDER, FOR SOLUTION ORAL at 23:36

## 2017-12-06 RX ADMIN — Medication 500 MILLIGRAM(S): at 17:22

## 2017-12-06 RX ADMIN — Medication 1 PUFF(S): at 21:09

## 2017-12-06 RX ADMIN — PANTOPRAZOLE SODIUM 40 MILLIGRAM(S): 20 TABLET, DELAYED RELEASE ORAL at 11:38

## 2017-12-06 RX ADMIN — ALBUTEROL 2 PUFF(S): 90 AEROSOL, METERED ORAL at 21:08

## 2017-12-06 RX ADMIN — ALBUTEROL 2 PUFF(S): 90 AEROSOL, METERED ORAL at 15:27

## 2017-12-06 RX ADMIN — Medication 650 MILLIGRAM(S): at 23:36

## 2017-12-06 RX ADMIN — Medication 1 MILLIGRAM(S): at 14:12

## 2017-12-06 RX ADMIN — ALBUTEROL 2 PUFF(S): 90 AEROSOL, METERED ORAL at 10:27

## 2017-12-06 RX ADMIN — Medication 650 MILLIGRAM(S): at 20:18

## 2017-12-06 RX ADMIN — CHLORHEXIDINE GLUCONATE 15 MILLILITER(S): 213 SOLUTION TOPICAL at 18:07

## 2017-12-06 RX ADMIN — Medication 1 PUFF(S): at 10:27

## 2017-12-06 RX ADMIN — FENTANYL CITRATE 1 PATCH: 50 INJECTION INTRAVENOUS at 12:22

## 2017-12-06 RX ADMIN — BUDESONIDE AND FORMOTEROL FUMARATE DIHYDRATE 2 PUFF(S): 160; 4.5 AEROSOL RESPIRATORY (INHALATION) at 10:27

## 2017-12-06 RX ADMIN — Medication 40 MILLIGRAM(S): at 17:19

## 2017-12-06 RX ADMIN — CHLORHEXIDINE GLUCONATE 15 MILLILITER(S): 213 SOLUTION TOPICAL at 05:56

## 2017-12-06 RX ADMIN — Medication 1 PUFF(S): at 03:33

## 2017-12-06 RX ADMIN — Medication 2 MILLIGRAM(S): at 17:20

## 2017-12-06 NOTE — PROGRESS NOTE ADULT - ATTENDING COMMENTS
Chronic respiratory failure secondary to sarcoidosis s/p lobar resection in the pats for hemoptysis and now s/p trach.  Weaning of sedation with plan for daily PST.  Plan to upsize trach to marjanley 8.  Continue voriconazole.  Continue to monitor for increase in intrathoracic pressures, stable loculated PTX post lobectomy.

## 2017-12-06 NOTE — PROGRESS NOTE ADULT - PROBLEM SELECTOR PLAN 1
now spiked to fever:  on zosyn, vancomycin, as well as voriconazole~  id FOLLOW UP  11/27: Cont antibiotics::  11/28: cont current treatment  11/30: continues to be on broad spectrum antibiotics:  12/01: antibiotics upgraded to meropenem :still febrile:  12/2 on Meropenem, Vanco, and Voriconazole T(F): 98.8 (02 Dec 2017 12:00), Max: 100.3 (02 Dec 2017 00:00)  12/3 afebrile for last 24 hours T(C): 37.1 (03 Dec 2017 12:00), Max: 37.1 (03 Dec 2017 12:00) on ABTs  12/04: antibiotics per ID: on voriconazole only  12/05: jump in his leucocytes as well low grae temperature: defer to primary team/ID  12/06: on voriconazole

## 2017-12-06 NOTE — PROGRESS NOTE ADULT - PROBLEM SELECTOR PLAN 6
controlled.  11/10 stable  11/11 stable  11/12 stable  11/18 off meds. not on pressor  11/19 stable without pressors  11/21: controlled  11/30: off an on pressors: currently off  12/2 on small dose of pressor. could be 2/2 propofol  12/3 on pressor  12/05: remains on and off pressors  12/6: off pressors

## 2017-12-06 NOTE — PROGRESS NOTE ADULT - PROBLEM SELECTOR PLAN 2
intubated: try to titrate as tolerated!  11/18 on ventilator support. Pt is sedated and paralyzed. defer ventilator Management/ weaning per CTICU team.  11/19 still sedated and paralyzed. Plan to wean off Nimbex today per Primary RN. ABGs reviewed.  11/20:ff paralyzing agent! But sedated !!  11/21: awake and alert: responding to simple commands: weaning trials  11/22: currently sedated: ? trach now?  11/23: for trach in AM  11/24: trach today  11/25: S/P trach yesterday: chest x-ray noted: Cont full vent support and wean if tolerates  11/26: [id number] with trach and full mechanical ventilation: Discussed with MICU attending in detail: for rpt ct scan chest  11/28: rpt ct scan c hst reviewed: for possible chest tube on rt side  11/30: on full vent support with weaning trials  12/01:cont weaning trails as tolerated; prednisone dose lowered!!  12/2 on CPAP trial. Continue wean off as tolerate  12/3 CPAP as tolerated  12/04: cont cpap trial: chest tube is out: Likely has BPF on the left side:  12/05: new ct noted: no official report: to me there is no improvement in left upper lobe PTX  12/6: chest tube fell out: no improvement in ptx:   12/6:

## 2017-12-06 NOTE — PROGRESS NOTE ADULT - ATTENDING COMMENTS
12/03: His lungs failed to re expand following pig tail to left upper airspace/ptx: He does have airleak: Likely has BPF: : He failed weaning trials too: Cont current supportive care as well as daily Weaning trials!!  12/05: cont current supportive treatment: has pretty poor lungs!!  12/06: critically ill: chst tube still out with same loculated ptx: cont supportive care !!

## 2017-12-06 NOTE — PROGRESS NOTE ADULT - PROBLEM SELECTOR PLAN 5
on prednisone.  11/10 continue steroid  11/11 on steroid  11/12 on oral steroid  11/13: cont oral steroids!!  11/14: Has pretty poor lungs secondary to steroids: Cont current therapy!!  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  11/27: cont on steroids  11/30: om 30 mg of prednisone  12/01:on 20 mg of prednisone now !!  12/02 Prednisone  12/3 on Prednisone  12/04:low taper of steroids  12/05: 10 mg of prednisone  12/06: on low dose steroids

## 2017-12-06 NOTE — PROGRESS NOTE ADULT - PROBLEM SELECTOR PLAN 4
11/15: Wheezing has increased: started on IV steroids high dose by thoracic team: Would suggest to decrease to 20 mg three times a day !  11/16: decrease steroids today to 20 mg three times day  11/17: try to decrease steroids in AM  11/18 Duoneb. intubated for acute respiratory distress with hypoxia  11/19 KIARA. intubated and on ventilator support  11/20: cont vent support: start weaning as tolerated!  11/21: would decrease  his steroids to 30 mg a day from tomorrow  11/22: DECREASE STEROIDS: HE WHEEZES WHEN THE STEROIDS ARE TAPERED: BUT NEED TO BE LOWERED NOW AND WATCH!!  11/23: on 40 mg of prednisone: try to decrease and taper  11/24: prednisone decreased to 30 ,g a day !!  11/25: on 30 mg a day now:  11/27: on steroids: for repeat ct scan chest today  11/28: ct reviewed with MICU attending  11/30: on bronchodilators and steroids: weaning trials  12/01:cont weaning trials as tolerated : new ct noted:not much reexpansion of ptx!!  12/2 on bronchodilators and steroid. Wean as per MICU  12/3 continue current treatment  12/04: cont BD and steroids  12/05: on low dose steroids now: at 10 mg per day !!  12/05:on low dose steroids

## 2017-12-06 NOTE — PROGRESS NOTE ADULT - ASSESSMENT
47M with hx of sarcoidosis, cerebrovascular infarct with PFO on echo and paroxysmal afib in September started on eliquis c/b recurrent hemoptysis (Eliquis d/c'd on 9/20, then restarted between 10/23-27 after hemoptysis improved), d/c'd to rehab then returned 11/9 for recurrent hemoptysis requiring intubation and CTICU admission for management of hemoptysis including IR embolization (11/16) and serial bronch (11/17 and 11/20) in CTICU with no active bleeding visualized, now remains intubated with difficulty weaning vent as during CPAP trials pt fails to initiate breaths, unclear etiology. Transferred to MICU from CTICU for continued vent management and attempted weaning. Bedside trach placed 11/24 c/b episode of hypoxia and bleeding around trach site after suctioning, resolved after surgicel and pressure. Has been febrile intermittently, though improving, grew Klebsiella on urine cx, blood cx with staph epi. Now with recurrent bloody secretions from trach s/p rpt bronch 11/30, spiked another temp 12/1/17 (102.7) and repeat cultures drawn. Also with DAVID collapse with concern for pneumothorax vs broncho-pleural fistula.     # Neuro:  - pt with increased agitation and discordant breathing on vent w/ high peak pressures  - now sedated with propofol and valium and without evid of distress on vent    #Cardiovascular:  - with vasoplegic shock requiring levo to maintain maps > 65  - plan to wean down proprofol and attempt to wean off levo  - DVT study yest was (-). No indication for AC at this time.     #Respiratory:  -incr'd agitation overnight, sedated with prop   -Trach placed 11/24  -L upper anterior pigtail fell out yest, was going to be DC'd anyway given improvement and so was not replaced.   -Continue bronchodilators and pulmonary toilet  -c/w prednisone 10mg  -Rpt CXR this morning as pigtail clamped 12/2  -Bloody secretions from trach initially, now improving  -CT neck from 12/1 with ?vessel irregularity on left adjacent to trach; small branch off thyroid artery (?pseudoaneruysm)     #GI:  -on tube feeds, will need evaluation for PEG (d/w GI)    #Renal:  -chronically elevated bicarb likely 2/2 chronic hypercarbia in setting of hypoventilation  -Monitor I's and O's    #Heme:  -hgb remains stable   - no DVT on LE US yest    #ID:  -Aspergilloma dx 9/24/17, on voriconazole x 6 weeks per ID recommendations.   -Recommend continuing voriconazole given hemoptysis recurred after was stopped, possible related to inflammation 2/2 aspergilloma and possible continued infection.  -last (+) blood cx w/ GPC on 11/26 now (-)   -Afebrile and improved, recent cultures all negative  -WBC incr'd this AM at 21, now at 12  - can hold off on additional abx    #Endocrine:   -On tube feeds, increased per dietary's recommendations FS q6h, ISS,    #DVT PPX: lovenox 47M with hx of sarcoidosis, cerebrovascular infarct with PFO on echo and paroxysmal afib in September started on eliquis c/b recurrent hemoptysis (Eliquis d/c'd on 9/20, then restarted between 10/23-27 after hemoptysis improved), d/c'd to rehab then returned 11/9 for recurrent hemoptysis requiring intubation and CTICU admission for management of hemoptysis including IR embolization (11/16) and serial bronch (11/17 and 11/20) in CTICU with no active bleeding visualized, now remains intubated with difficulty weaning vent as during CPAP trials pt fails to initiate breaths, unclear etiology. Transferred to MICU from CTICU for continued vent management and attempted weaning. Bedside trach placed 11/24 c/b episode of hypoxia and bleeding around trach site after suctioning, resolved after surgicel and pressure. Has been febrile intermittently, though improving, grew Klebsiella on urine cx, blood cx with staph epi. Now with recurrent bloody secretions from trach s/p rpt bronch 11/30, spiked another temp 12/1/17 (102.7) and repeat cultures drawn. Also with DAVID collapse with concern for pneumothorax vs broncho-pleural fistula.     # Neuro:  - pt with increased agitation and discordant breathing on vent w/ high peak pressures  - now sedated with propofol and valium and without evid of distress on vent    #Cardiovascular:  - with vasoplegic shock requiring levo to maintain maps > 65  - plan to wean down proprofol and attempt to wean off levo  - DVT study yest was (-). No indication for AC at this time.     #Respiratory:  -incr'd agitation overnight, sedated with prop   -Trach placed 11/24  -L upper anterior pigtail fell out 12/5  -Continue bronchodilators and pulmonary toilet  -c/w prednisone 10mg  -Rpt CXR this morning as pigtail clamped 12/2  -Bloody secretions from trach initially, now improving  -CT neck from 12/1 with ?vessel irregularity on left adjacent to trach; small branch off thyroid artery (?pseudoaneruysm)     #GI:  -on tube feeds, will need evaluation for PEG (d/w GI)    #Renal:  -chronically elevated bicarb likely 2/2 chronic hypercarbia in setting of hypoventilation  -Monitor I's and O's    #Heme:  -hgb remains stable   - no DVT on LE US 12/5    #ID:  -Aspergilloma dx 9/24/17, on voriconazole x 6 weeks per ID recommendations.   -Recommend continuing voriconazole given hemoptysis recurred after was stopped, possible related to inflammation 2/2 aspergilloma and possible continued infection.  -last (+) blood cx w/ GPC on 11/26 now (-)   -Afebrile and improved, recent cultures all negative  -WBC incr'd this AM at 21, now at 12  - can hold off on additional abx    #Endocrine:   -On tube feeds, increased per dietary's recommendations FS q6h, ISS,    #DVT PPX: lovenox 47M with hx of sarcoidosis, cerebrovascular infarct with PFO on echo and paroxysmal afib in September started on eliquis c/b recurrent hemoptysis (Eliquis d/c'd on 9/20, then restarted between 10/23-27 after hemoptysis improved), d/c'd to rehab then returned 11/9 for recurrent hemoptysis requiring intubation and CTICU admission for management of hemoptysis including IR embolization (11/16) and serial bronch (11/17 and 11/20) in CTICU with no active bleeding visualized, now remains intubated with difficulty weaning vent as during CPAP trials pt fails to initiate breaths, unclear etiology. Transferred to MICU from CTICU for continued vent management and attempted weaning. Bedside trach placed 11/24 c/b episode of hypoxia and bleeding around trach site after suctioning, resolved after surgicel and pressure. Has been febrile intermittently, though improving, grew Klebsiella on urine cx, blood cx with staph epi. Now with recurrent bloody secretions from trach s/p rpt bronch 11/30, spiked another temp 12/1/17 (102.7) and repeat cultures drawn. Also with DAVID collapse with concern for pneumothorax vs broncho-pleural fistula.     # Neuro:  - pt with increased agitation and discordant breathing on vent w/ high peak pressures  - now sedated with propofol and valium and without evid of distress on vent    #Cardiovascular:  - No longer on levophed  - DVT study was (-). No indication for AC at this time  - LUE edematous - will order LUE DVT study    #Respiratory:  - Patient has intermittently high peak pressures possibly secondary to secretions, tracheostomy tube compression, vent discordance  -Trach placed 11/24  -L upper anterior pigtail fell out 12/5  - L loculated pneumothorax unchanged on CT  -Continue bronchodilators and pulmonary toilet  -c/w prednisone 10mg  -Bloody secretions from trach initially, now improving  -CT neck from 12/1 with ?vessel irregularity on left adjacent to trach; small branch off thyroid artery (?pseudoaneruysm)     #GI:  - s/p PEG placement - no food for 24hrs, no meds for 48 hours  - NPO for now    #Renal:  -chronically elevated bicarb likely 2/2 chronic hypercarbia in setting of hypoventilation  - Monitor I's and O's  - Lasix for active diuresis    #Heme:  - hgb remains stable   - no DVT on LE US 12/5    #ID:  -Aspergilloma dx 9/24/17, on voriconazole x 6 weeks per ID recommendations.   -Recommend continuing voriconazole given hemoptysis recurred after was stopped, possible related to inflammation 2/2 aspergilloma and possible continued infection.  -last (+) blood cx w/ GPC on 11/26 now (-)   -Afebrile and improved, recent cultures all negative  -WBC incr'd this AM at 21, now at 12  - can hold off on additional abx    #Endocrine:   -On tube feeds, increased per dietary's recommendations FS q6h, ISS,    #DVT PPX: lovenox 47M with hx of sarcoidosis, cerebrovascular infarct with PFO on echo and paroxysmal afib in September started on eliquis c/b recurrent hemoptysis (Eliquis d/c'd on 9/20, then restarted between 10/23-27 after hemoptysis improved), d/c'd to rehab then returned 11/9 for recurrent hemoptysis requiring intubation and CTICU admission for management of hemoptysis including IR embolization (11/16) and serial bronch (11/17 and 11/20) in CTICU with no active bleeding visualized, now remains intubated with difficulty weaning vent as during CPAP trials pt fails to initiate breaths, unclear etiology. Transferred to MICU from CTICU for continued vent management and attempted weaning. Bedside trach placed 11/24 c/b episode of hypoxia and bleeding around trach site after suctioning, resolved after surgicel and pressure. Has been febrile intermittently, though improving, grew Klebsiella on urine cx, blood cx with staph epi. Now with recurrent bloody secretions from trach s/p rpt bronch 11/30, spiked another temp 12/1/17 (102.7) and repeat cultures drawn. Also with DAVID collapse with concern for pneumothorax vs broncho-pleural fistula.     # Neuro:  - pt with increased agitation and discordant breathing on vent w/ high peak pressures  - now sedated with propofol and valium and without evid of distress on vent    #Cardiovascular:  - No longer on levophed  - DVT study was (-). No indication for AC at this time  - LUE edematous - will order LUE DVT study    #Respiratory:  - Patient has intermittently high peak pressures possibly secondary to secretions, tracheostomy tube compression, vent discordance  -Trach placed 11/24  -L upper anterior pigtail fell out 12/5  - L loculated pneumothorax unchanged on CT  -Continue bronchodilators and pulmonary toilet  -c/w prednisone 10mg  -Bloody secretions from trach initially, now improving  -CT neck from 12/1 with ?vessel irregularity on left adjacent to trach; small branch off thyroid artery (?pseudoaneruysm)     #GI:  - s/p PEG placement - no food for 24hrs, no meds for 48 hours  - NPO for now    #Renal:  - chronically elevated bicarb likely 2/2 chronic hypercarbia in setting of hypoventilation  - Monitor I's and O's  - Lasix for active diuresis    #Heme:  - hgb remains stable   - no DVT on LE US 12/5    #ID:  -Aspergilloma dx 9/24/17, on voriconazole x 6 weeks per ID recommendations.   -Recommend continuing voriconazole given hemoptysis recurred after was stopped, possible related to inflammation 2/2 aspergilloma and possible continued infection  - confirmed voriconazole dosing with ID  -last (+) blood cx w/ GPC on 11/26 now (-)   -Afebrile and improved, recent cultures all negative  -WBC stable now at 13  - can hold off on additional abx    #Endocrine:   - ISS  - Continue to monitor glucose levels    #DVT PPX: lovenox

## 2017-12-06 NOTE — PROGRESS NOTE ADULT - SUBJECTIVE AND OBJECTIVE BOX
CHIEF COMPLAINT:  Patient is a 47y old  Male who presents with a chief complaint of hemoptysis (09 Nov 2017 00:32)    HPI:  47 year old male with a PMHx of sarcoidosis, HTN, asthma, and COPD presents to the ED with severe leg spasms and headache leading to a presyncopal episode, found to have a significant abnormal EKG and moderate inspiratory and expiratory wheezing in the setting of Entero/Rhino virus, admitted to tele for Near Syncope, r/o ACS and COPD exacerbation.  Hospital course complicated by stroke and PAF- AC on hold due to hemoptysis; 9/21-FB/BAL done by Dr. Arias. Intraop: Bleeding from DAVID (non-lingular)-Recom IR to embolize; 9/22 CT angio done per IR Lobko request.  Formal PFTS and ask Card to fix PFO now to decrease blood flow; 9/24- Developed worsening hemoptysis. Emergently kristel to OR and had  Left thoractomy DAVID. Brought to CTICU.  9/24: 1400ml EBL in OR; 10 u PRBCs;9/25-Brought back to OR for bleeding. Had Reop Thoracotomy, evac left hemothorax. CTI- DAVID space despite -40 Sxn, MEYER, L Hemiparesis (CVA) better w walking; Patient post op with prolongd air leak shila ptx, bedside pleurodesis done.  Developed SIRS after pleurodesis-> CTICU; On pressors. Pt. clinically improved in CTICU. Maintained on Steroids, weaned down to Solumedrol BID. Off pressors and transitioned to Midodrine. Still with thrombocytopenia but no bleeding. Continued on Eliquis but no ASA for h/o CVA. Neuro deficits improving.  Air leak resolved on 10/27. 10/30 Chest tube removed, cxr stable.  Steriods to po taper.  Patient stable for discharge.  11/2-Pt ambulating w PT frequently. Feels well. Pain controlled. All labs and vital signs stable. Wound healing. Arnold po steroid taper. To complete Voriconazole on Nov 4th. CXRs and reports reviewed by and cleared by Dr. Caldera. Pt. to fu for PFO closure as output. Still requires intermittent oxygen. Cleared for discharge by Dr. Caldera to acute rehab.    Patient presented to [facility] after rehab send patient with hemoptysis, patient had 2 episodes of hemoptysis, less than 10cc in total for 24 hours.  Patient states it happens when he coughs, blood tinged sputum.  Patient otherwise doing well in rehab. (09 Nov 2017 00:32)    Interval Events:    Pt was discordant on vent yest, was restarted on propofol, had bronch during some mucus plugs were removed, CT chest performed without significant interval changes.     REVIEW OF SYSTEMS limited by sedation  Denies pain, some discomfort with trach    OBJECTIVE:  ICU Vital Signs Last 24 Hrs  T(C): 37.9 (05 Dec 2017 04:00), Max: 37.9 (05 Dec 2017 04:00)  T(F): 100.2 (05 Dec 2017 04:00), Max: 100.2 (05 Dec 2017 04:00)  HR: 112 (05 Dec 2017 06:00) (86 - 138)  BP: 117/87 (05 Dec 2017 06:00) (70/41 - 163/60)  BP(mean): 93 (05 Dec 2017 06:00) (45 - 121)  ABP: --  ABP(mean): --  RR: 13 (05 Dec 2017 06:00) (10 - 31)  SpO2: 100% (05 Dec 2017 06:00) (86% - 100%)    Mode: AC/ CMV (Assist Control/ Continuous Mandatory Ventilation), RR (machine): 12, TV (machine): 500, FiO2: 50, PEEP: 5, MAP: 14, PIP: 40    12-03 @ 07:01  -  12-04 @ 07:00  --------------------------------------------------------  IN: 3257 mL / OUT: 605 mL / NET: 2652 mL    12-04 @ 07:01  -  12-05 @ 06:53  --------------------------------------------------------  IN: 853.5 mL / OUT: 2600 mL / NET: -1746.5 mL    CAPILLARY BLOOD GLUCOSE    POCT Blood Glucose.: 98 mg/dL (05 Dec 2017 05:22)    PHYSICAL EXAM:  General: NAD, well-developed  Eyes: EOMI, PERRLA, conjunctiva and sclera clear  Neck: Supple, No JVD  Chest/Lung: Clear to auscultation bilaterally on ant exam; No wheezes  Heart: Regular rate and rhythm; No murmurs, rubs, or gallops. Capillary refill WNL  Abdome: Soft, Nontender, Nondistended; Bowel sounds present  Extremities: (+) anasarca  Psych: AAOx3  Neurology: non-focal, follows commands, able to move all extrems, able to communicate.   Skin: No rashes or lesions    LINES:    HOSPITAL MEDICATIONS:  enoxaparin Injectable 40 milliGRAM(s) SubCutaneous daily    voriconazole IVPB 300 milliGRAM(s) IV Intermittent every 12 hours    norepinephrine Infusion 0.05 MICROgram(s)/kG/Min IV Continuous <Continuous>    insulin lispro (HumaLOG) corrective regimen sliding scale   SubCutaneous every 6 hours  predniSONE   Tablet 10 milliGRAM(s) Oral daily    ALBUTerol    90 MICROgram(s) HFA Inhaler 2 Puff(s) Inhalation every 6 hours  buDESOnide 160 MICROgram(s)/formoterol 4.5 MICROgram(s) Inhaler 2 Puff(s) Inhalation two times a day  ipratropium 17 MICROgram(s) HFA Inhaler 1 Puff(s) Inhalation every 6 hours    acetaminophen   Tablet 650 milliGRAM(s) Oral every 6 hours PRN  diazepam    Tablet 2 milliGRAM(s) Oral two times a day  propofol Infusion 10 MICROgram(s)/kG/Min IV Continuous <Continuous>  valproic  acid Syrup 500 milliGRAM(s) Oral two times a day    polyethylene glycol 3350 17 Gram(s) Oral at bedtime  senna Syrup 10 milliLiter(s) Oral at bedtime    ferrous    sulfate Liquid 300 milliGRAM(s) Enteral Tube daily  folic acid 1 milliGRAM(s) Oral daily    chlorhexidine 0.12% Liquid 15 milliLiter(s) Swish and Spit two times a day  chlorhexidine 4% Liquid 1 Application(s) Topical daily      LABS:                        8.3    21.13 )-----------( 217      ( 05 Dec 2017 03:55 )             27.1     Hgb Trend: 8.3<--, 7.6<--, 7.6<--, 6.4<--, 7.5<--  12-05    141  |  93<L>  |  10  ----------------------------<  96  4.2   |  40<H>  |  0.77    Ca    8.8      05 Dec 2017 03:55  Phos  4.1     12-05  Mg     2.0     12-05      Creatinine Trend: 0.77<--, 0.61<--, 0.59<--, 0.79<--, 0.74<--, 0.70<--  PT/INR - ( 05 Dec 2017 03:55 )   PT: 14.3 SEC;   INR: 1.27          PTT - ( 05 Dec 2017 03:55 )  PTT:28.1 SEC          MICROBIOLOGY:     RADIOLOGY:  [ ] Reviewed and interpreted by me    EKG: CHIEF COMPLAINT:  Patient is a 47y old  Male who presents with a chief complaint of hemoptysis (09 Nov 2017 00:32)    HPI:  47 year old male with a PMHx of sarcoidosis, HTN, asthma, and COPD presents to the ED with severe leg spasms and headache leading to a presyncopal episode, found to have a significant abnormal EKG and moderate inspiratory and expiratory wheezing in the setting of Entero/Rhino virus, admitted to tele for Near Syncope, r/o ACS and COPD exacerbation.  Hospital course complicated by stroke and PAF- AC on hold due to hemoptysis; 9/21-FB/BAL done by Dr. Arias. Intraop: Bleeding from DAVID (non-lingular)-Recom IR to embolize; 9/22 CT angio done per IR Lobko request.  Formal PFTS and ask Card to fix PFO now to decrease blood flow; 9/24- Developed worsening hemoptysis. Emergently kristel to OR and had  Left thoractomy DAVID. Brought to CTICU.  9/24: 1400ml EBL in OR; 10 u PRBCs;9/25-Brought back to OR for bleeding. Had Reop Thoracotomy, evac left hemothorax. CTI- DAVID space despite -40 Sxn, MEYER, L Hemiparesis (CVA) better w walking; Patient post op with prolongd air leak shila ptx, bedside pleurodesis done.  Developed SIRS after pleurodesis-> CTICU; On pressors. Pt. clinically improved in CTICU. Maintained on Steroids, weaned down to Solumedrol BID. Off pressors and transitioned to Midodrine. Still with thrombocytopenia but no bleeding. Continued on Eliquis but no ASA for h/o CVA. Neuro deficits improving.  Air leak resolved on 10/27. 10/30 Chest tube removed, cxr stable.  Steriods to po taper.  Patient stable for discharge.  11/2-Pt ambulating w PT frequently. Feels well. Pain controlled. All labs and vital signs stable. Wound healing. Arnold po steroid taper. To complete Voriconazole on Nov 4th. CXRs and reports reviewed by and cleared by Dr. Caldera. Pt. to fu for PFO closure as output. Still requires intermittent oxygen. Cleared for discharge by Dr. Caldera to acute rehab.    Patient presented to [facility] ER after rehab send patient with hemoptysis, patient had 2 episodes of hemoptysis, less than 10cc in total for 24 hours.  Patient states it happens when he coughs, blood tinged sputum.  Patient otherwise doing well in rehab. (09 Nov 2017 00:32)    Interval Events:    Pt was discordant on vent 2 days ago, was restarted on propofol, had bronch during some mucus plugs were removed, CT chest performed without significant interval changes. Yesterday attempted to extubate again - discordant and also agonal breaths  ABG ON hypercapneic, but stable. Peak pressures 40's.  Intermittently hypoglycemic -->D50 pushes    REVIEW OF SYSTEMS limited by sedation  Denies pain, some discomfort with trach    OBJECTIVE:  ICU Vital Signs Last 24 Hrs  T(C): 37.9 (05 Dec 2017 04:00), Max: 37.9 (05 Dec 2017 04:00)  T(F): 100.2 (05 Dec 2017 04:00), Max: 100.2 (05 Dec 2017 04:00)  HR: 112 (05 Dec 2017 06:00) (86 - 138)  BP: 117/87 (05 Dec 2017 06:00) (70/41 - 163/60)  BP(mean): 93 (05 Dec 2017 06:00) (45 - 121)  ABP: --  ABP(mean): --  RR: 13 (05 Dec 2017 06:00) (10 - 31)  SpO2: 100% (05 Dec 2017 06:00) (86% - 100%)    Mode: AC/ CMV (Assist Control/ Continuous Mandatory Ventilation), RR (machine): 12, TV (machine): 500, FiO2: 50, PEEP: 5, MAP: 14, PIP: 40    12-03 @ 07:01  -  12-04 @ 07:00  --------------------------------------------------------  IN: 3257 mL / OUT: 605 mL / NET: 2652 mL    12-04 @ 07:01  -  12-05 @ 06:53  --------------------------------------------------------  IN: 853.5 mL / OUT: 2600 mL / NET: -1746.5 mL    CAPILLARY BLOOD GLUCOSE    POCT Blood Glucose.: 98 mg/dL (05 Dec 2017 05:22)    PHYSICAL EXAM:  General: NAD, well-developed  Eyes: EOMI, PERRLA, conjunctiva and sclera clear  Neck: Supple, No JVD  Chest/Lung: Clear to auscultation bilaterally on ant exam; No wheezes  Heart: Regular rate and rhythm; No murmurs, rubs, or gallops. Capillary refill WNL  Abdome: Soft, Nontender, Nondistended; Bowel sounds present  Extremities: (+) anasarca  Psych: AAOx3  Neurology: non-focal, follows commands, able to move all extrems, able to communicate.   Skin: No rashes or lesions    LINES:    HOSPITAL MEDICATIONS:  enoxaparin Injectable 40 milliGRAM(s) SubCutaneous daily    voriconazole IVPB 300 milliGRAM(s) IV Intermittent every 12 hours    norepinephrine Infusion 0.05 MICROgram(s)/kG/Min IV Continuous <Continuous>    insulin lispro (HumaLOG) corrective regimen sliding scale   SubCutaneous every 6 hours  predniSONE   Tablet 10 milliGRAM(s) Oral daily    ALBUTerol    90 MICROgram(s) HFA Inhaler 2 Puff(s) Inhalation every 6 hours  buDESOnide 160 MICROgram(s)/formoterol 4.5 MICROgram(s) Inhaler 2 Puff(s) Inhalation two times a day  ipratropium 17 MICROgram(s) HFA Inhaler 1 Puff(s) Inhalation every 6 hours    acetaminophen   Tablet 650 milliGRAM(s) Oral every 6 hours PRN  diazepam    Tablet 2 milliGRAM(s) Oral two times a day  propofol Infusion 10 MICROgram(s)/kG/Min IV Continuous <Continuous>  valproic  acid Syrup 500 milliGRAM(s) Oral two times a day    polyethylene glycol 3350 17 Gram(s) Oral at bedtime  senna Syrup 10 milliLiter(s) Oral at bedtime    ferrous    sulfate Liquid 300 milliGRAM(s) Enteral Tube daily  folic acid 1 milliGRAM(s) Oral daily    chlorhexidine 0.12% Liquid 15 milliLiter(s) Swish and Spit two times a day  chlorhexidine 4% Liquid 1 Application(s) Topical daily      LABS:  .  Labs reviewed personally.                          8.3    9.51  )-----------( 186      ( 06 Dec 2017 02:37 )             27.7     Hgb Trend: 8.3<--, 8.6<--, 8.3<--, 7.6<--, 7.6<--  12-06    148<H>  |  96<L>  |  9   ----------------------------<  92  4.4   |  45<HH>  |  0.78    Ca    9.0      06 Dec 2017 02:37  Phos  4.0     12-06  Mg     1.9     12-06      Creatinine Trend: 0.78<--, 0.77<--, 0.61<--, 0.59<--, 0.79<--, 0.74<--  PT/INR - ( 05 Dec 2017 03:55 )   PT: 14.3 SEC;   INR: 1.27          PTT - ( 05 Dec 2017 03:55 )  PTT:28.1 SEC      Imaging reviewed personally.

## 2017-12-06 NOTE — PROGRESS NOTE ADULT - SUBJECTIVE AND OBJECTIVE BOX
Patient is a 47y old  Male who presents with a chief complaint of hemoptysis (09 Nov 2017 00:32)    doing same: remaining critically ill    Any change in ROS:     MEDICATIONS  (STANDING):  ALBUTerol    90 MICROgram(s) HFA Inhaler 2 Puff(s) Inhalation every 6 hours  buDESOnide 160 MICROgram(s)/formoterol 4.5 MICROgram(s) Inhaler 2 Puff(s) Inhalation two times a day  chlorhexidine 0.12% Liquid 15 milliLiter(s) Swish and Spit two times a day  chlorhexidine 4% Liquid 1 Application(s) Topical daily  diazepam    Tablet 2 milliGRAM(s) Oral two times a day  enoxaparin Injectable 40 milliGRAM(s) SubCutaneous daily  fentaNYL   Patch  50 MICROgram(s)/Hr 1 Patch Transdermal every 72 hours  ferrous    sulfate Liquid 300 milliGRAM(s) Enteral Tube daily  folic acid 1 milliGRAM(s) Oral daily  insulin lispro (HumaLOG) corrective regimen sliding scale   SubCutaneous every 6 hours  ipratropium 17 MICROgram(s) HFA Inhaler 1 Puff(s) Inhalation every 6 hours  pantoprazole  Injectable 40 milliGRAM(s) IV Push daily  polyethylene glycol 3350 17 Gram(s) Oral at bedtime  predniSONE   Tablet 10 milliGRAM(s) Oral daily  senna Syrup 10 milliLiter(s) Oral at bedtime  valproic  acid Syrup 500 milliGRAM(s) Oral two times a day  voriconazole IVPB 300 milliGRAM(s) IV Intermittent every 12 hours    MEDICATIONS  (PRN):  acetaminophen   Tablet 650 milliGRAM(s) Oral every 6 hours PRN For Temp greater than 38 C (100.4 F)    Vital Signs Last 24 Hrs  T(C): 36 (06 Dec 2017 12:00), Max: 37.8 (06 Dec 2017 00:00)  T(F): 96.8 (06 Dec 2017 12:00), Max: 100 (06 Dec 2017 00:00)  HR: 96 (06 Dec 2017 17:00) (95 - 118)  BP: 130/88 (06 Dec 2017 17:00) (83/50 - 139/95)  BP(mean): 99 (06 Dec 2017 17:00) (58 - 104)  RR: 15 (06 Dec 2017 17:00) (8 - 30)  SpO2: 100% (06 Dec 2017 17:00) (98% - 100%)  Mode: AC/ CMV (Assist Control/ Continuous Mandatory Ventilation)  RR (machine): 8  TV (machine): 500  FiO2: 40  PEEP: 5  MAP: 11  PIP: 29    I&O's Summary    05 Dec 2017 07:01  -  06 Dec 2017 07:00  --------------------------------------------------------  IN: 811.1 mL / OUT: 3035 mL / NET: -2223.9 mL    06 Dec 2017 07:01  -  06 Dec 2017 17:55  --------------------------------------------------------  IN: 322 mL / OUT: 895 mL / NET: -573 mL          Physical Exam:   GENERAL: NAD, well-groomed, well-developed  HEENT: GIOVANI/   Atraumatic, Normocephalic  ENMT: No tonsillar erythema, exudates, or enlargement; Moist mucous membranes, Good dentition, No lesions  NECK: Supple, No JVD, Normal thyroid  CHEST/LUNG: coarse breath sounds   CVS: Regular rate and rhythm; No murmurs, rubs, or gallops  GI: : Soft, Nontender, Nondistended; Bowel sounds present  NERVOUS SYSTEM:  sedated   EXTREMITIES:  2+  edema  LYMPH: No lymphadenopathy noted  SKIN: No rashes or lesions  ENDOCRINOLOGY: No Thyromegaly  PSYCH: sedated    Labs:  ABG - ( 05 Dec 2017 20:07 )  pH: 7.42  /  pCO2: 67    /  pO2: 152   / HCO3: 41    / Base Excess: 17.2  /  SaO2: 99.6                                        8.3    9.51  )-----------( 186      ( 06 Dec 2017 02:37 )             27.7                         8.6    12.67 )-----------( 181      ( 05 Dec 2017 17:25 )             28.4                         8.3    21.13 )-----------( 217      ( 05 Dec 2017 03:55 )             27.1                         7.6    8.16  )-----------( 170      ( 04 Dec 2017 03:30 )             23.7                         7.6    8.21  )-----------( 152      ( 03 Dec 2017 15:30 )             23.7                         6.4    8.46  )-----------( 151      ( 03 Dec 2017 02:30 )             20.6     12-06    148<H>  |  96<L>  |  9   ----------------------------<  92  4.4   |  45<HH>  |  0.78  12-05    141  |  93<L>  |  10  ----------------------------<  96  4.2   |  40<H>  |  0.77  12-04    140  |  93<L>  |  14  ----------------------------<  110<H>  3.7   |  39<H>  |  0.61  12-03    143  |  96<L>  |  15  ----------------------------<  139<H>  3.6   |  37<H>  |  0.59    Ca    9.0      06 Dec 2017 02:37  Ca    8.8      05 Dec 2017 03:55  Phos  4.0     12-06  Phos  4.1     12-05  Mg     1.9     12-06  Mg     2.0     12-05    TPro  5.5<L>  /  Alb  2.3<L>  /  TBili  0.2  /  DBili  x   /  AST  16  /  ALT  11  /  AlkPhos  60  12-03    CAPILLARY BLOOD GLUCOSE      POCT Blood Glucose.: 86 mg/dL (06 Dec 2017 17:17)  POCT Blood Glucose.: 83 mg/dL (06 Dec 2017 11:32)  POCT Blood Glucose.: 78 mg/dL (06 Dec 2017 05:58)  POCT Blood Glucose.: 79 mg/dL (05 Dec 2017 23:08)  POCT Blood Glucose.: 79 mg/dL (05 Dec 2017 18:16)        PT/INR - ( 05 Dec 2017 03:55 )   PT: 14.3 SEC;   INR: 1.27          PTT - ( 05 Dec 2017 03:55 )  PTT:28.1 SEC    Cultures:           Wound culture:                12-01 @ 06:18  Organism --  Culture w/ gram stain --  Specimen Source BLOOD PERIPHERAL      Abscess culture:             12-01 @ 06:18  Organism --  Gram Stain --  Specimen Source BLOOD PERIPHERAL        Tissue culture:           12-01 @ 06:18  Organism --  Gram Stain --  Specimen Source BLOOD PERIPHERAL      Body Fluid Smear & Culture:                        12-01 @ 06:18  AFB Smear  --  Culture Acid Fast Body Fluid w/ Smear  --  Culture Acid Fast Smear Concentrated   --    Culture Results:     --  Specimen Source BLOOD PERIPHERAL              Studies  Chest X-RAY  CT SCAN Chest   Venous Dopplers: LE:   CT Abdomen  Others    < from: CT Chest No Cont (12.04.17 @ 21:51) >  lass opacity is similar to the prior study. Unchanged large left   apical pneumothorax.    MEDIASTINUM AND PLEURA: Increased number of nonenlarged lymph nodes. The   visualized portion of the thyroid gland is unremarkable. Small bilateral   pleural effusions, greater on the right.          HEART AND VESSELS: The heart is normal in size.  There are no   atherosclerotic calcifications of the aorta.  There is no pericardial   effusion.         UPPER ABDOMEN: Images of the upper abdomen demonstrate no abnormality.     BONES AND SOFT TISSUES: Postsurgical rib changes.  The soft tissues are   unremarkable.    TUBES/LINES: Enteric tube tip in stomach.    IMPRESSION:   Large left apical pneumothorax is unchanged from the prior study.                  HOUSTON JARAMILLO M.D., ATTENDING RADIOLOGIST  This document has been electronically signed. Dec  6 2017 11:41AM    < end of copied text >

## 2017-12-06 NOTE — PROGRESS NOTE ADULT - ASSESSMENT
Impression:   - Dysphagia 2/2 reduced levels of consciousness or cognition s/p peg tube plcement on 12/5   - Chronic resp failure 2/2 tracheostomy tube placement on 11/24  - Aspergilloma dx 9/24/17, on voriconazole, ID following     Plan:   -   - supportive care as per primary team    GI team will signoff.   Thank you for the consult.  Please call us back if you have any questions. Impression:   - Dysphagia 2/2 reduced levels of consciousness or cognition s/p peg tube plcement on 12/5   - Chronic resp failure 2/2 tracheostomy tube placement on 11/24  - Aspergilloma dx 9/24/17, on voriconazole, ID following     Plan:   - peg tube site examined, H/h stable, no bleeding/discharge at the site, bumper is freely movable, okay to use peg tube for feeding   - supportive care as per primary team    GI team will signoff.   Thank you for the consult.  Please call us back if you have any questions. Impression:   - Dysphagia 2/2 reduced levels of consciousness or cognition s/p peg tube plcement on 12/5   - Chronic resp failure 2/2 tracheostomy tube placement on 11/24  - Aspergilloma dx 9/24/17, on voriconazole, ID following     Plan:   - peg tube site examined: clean/dry/intact, Hb stable, no bleeding/discharge at the site, bumper is freely movable, okay to use peg tube for feeding   - supportive care as per primary team      GI team will signoff.   Thank you for the consult.  Please call us back if you have any questions.

## 2017-12-06 NOTE — PROGRESS NOTE ADULT - SUBJECTIVE AND OBJECTIVE BOX
Chief Complaint:  Patient is a 47y old  Male who presents with a chief complaint of hemoptysis (2017 00:32)      Interval Events:   Pt had EGD with peg tube placement yesterday.     Allergies:  No Known Allergies      Hospital Medications:  acetaminophen   Tablet 650 milliGRAM(s) Oral every 6 hours PRN  ALBUTerol    90 MICROgram(s) HFA Inhaler 2 Puff(s) Inhalation every 6 hours  buDESOnide 160 MICROgram(s)/formoterol 4.5 MICROgram(s) Inhaler 2 Puff(s) Inhalation two times a day  chlorhexidine 0.12% Liquid 15 milliLiter(s) Swish and Spit two times a day  chlorhexidine 4% Liquid 1 Application(s) Topical daily  diazepam    Tablet 2 milliGRAM(s) Oral two times a day  enoxaparin Injectable 40 milliGRAM(s) SubCutaneous daily  ferrous    sulfate Liquid 300 milliGRAM(s) Enteral Tube daily  folic acid 1 milliGRAM(s) Oral daily  insulin lispro (HumaLOG) corrective regimen sliding scale   SubCutaneous every 6 hours  ipratropium 17 MICROgram(s) HFA Inhaler 1 Puff(s) Inhalation every 6 hours  norepinephrine Infusion 0.05 MICROgram(s)/kG/Min IV Continuous <Continuous>  pantoprazole  Injectable 40 milliGRAM(s) IV Push daily  polyethylene glycol 3350 17 Gram(s) Oral at bedtime  predniSONE   Tablet 10 milliGRAM(s) Oral daily  propofol Infusion 10 MICROgram(s)/kG/Min IV Continuous <Continuous>  senna Syrup 10 milliLiter(s) Oral at bedtime  valproic  acid Syrup 500 milliGRAM(s) Oral two times a day  voriconazole IVPB 300 milliGRAM(s) IV Intermittent every 12 hours      PMHX/PSHX:  History of pneumothorax  COPD (chronic obstructive pulmonary disease)  Asthma  Hypertension  Sarcoidosis  History of thoracotomy  No significant past surgical history      Family history:  Family history of pancreatic cancer  Family history of essential hypertension (Father)      ROS:     unable to assess      PHYSICAL EXAM:     General: NAD, well-developed  Eyes: EOMI, PERRLA, conjunctiva and sclera clear  Neck: Supple, No JVD  Chest/Lung: Clear to auscultation bilaterally on ant exam; No wheezes  Heart: Regular rate and rhythm; No murmurs, rubs, or gallops. Capillary refill WNL  Abdome: Soft, Nontender, Nondistended; Bowel sounds present  Extremities: (+) anasarca  Psych: AAOx3  Neurology: non-focal, follows commands, able to move all extrems, able to communicate.   Skin: No rashes or lesions    Vital Signs:  Vital Signs Last 24 Hrs  T(C): 37.5 (06 Dec 2017 04:00), Max: 37.8 (06 Dec 2017 00:00)  T(F): 99.5 (06 Dec 2017 04:00), Max: 100 (06 Dec 2017 00:00)  HR: 106 (06 Dec 2017 07:27) (96 - 118)  BP: 95/61 (06 Dec 2017 07:00) (83/50 - 138/81)  BP(mean): 68 (06 Dec 2017 07:00) (58 - 101)  RR: 22 (06 Dec 2017 07:00) (8 - 33)  SpO2: 100% (06 Dec 2017 07:27) (97% - 100%)  Daily     Daily Weight in k.3 (06 Dec 2017 04:00)    LABS:                        8.3    9.51  )-----------( 186      ( 06 Dec 2017 02:37 )             27.7     12-06    148<H>  |  96<L>  |  9   ----------------------------<  92  4.4   |  45<HH>  |  0.78    Ca    9.0      06 Dec 2017 02:37  Phos  4.0     12-06  Mg     1.9     12-06        PT/INR - ( 05 Dec 2017 03:55 )   PT: 14.3 SEC;   INR: 1.27          PTT - ( 05 Dec 2017 03:55 )  PTT:28.1 SEC        Imaging:      < from: Upper Endoscopy (17 @ 10:58) >  Impression:          - No gross lesions in esophagus.                       - No gross lesions in the stomach.                       - No gross lesions in duodenum.                       - A PEG placement was successfully completed.                       - No specimens collected.  Recommendation:      - Observe patient in MICU for ongoing care.                       - Please follow the post-PEG recommendations including:                        change dressing once per day, NPO x4 hrs then water                        today and may use PEG tomorrow for feedings.    < end of copied text > Chief Complaint:  Patient is a 47y old  Male who presents with a chief complaint of hemoptysis (2017 00:32)      Interval Events:   Pt had EGD with peg tube placement yesterday. He tolerated the procedure well.       Allergies:  No Known Allergies      Hospital Medications:  acetaminophen   Tablet 650 milliGRAM(s) Oral every 6 hours PRN  ALBUTerol    90 MICROgram(s) HFA Inhaler 2 Puff(s) Inhalation every 6 hours  buDESOnide 160 MICROgram(s)/formoterol 4.5 MICROgram(s) Inhaler 2 Puff(s) Inhalation two times a day  chlorhexidine 0.12% Liquid 15 milliLiter(s) Swish and Spit two times a day  chlorhexidine 4% Liquid 1 Application(s) Topical daily  diazepam    Tablet 2 milliGRAM(s) Oral two times a day  enoxaparin Injectable 40 milliGRAM(s) SubCutaneous daily  ferrous    sulfate Liquid 300 milliGRAM(s) Enteral Tube daily  folic acid 1 milliGRAM(s) Oral daily  insulin lispro (HumaLOG) corrective regimen sliding scale   SubCutaneous every 6 hours  ipratropium 17 MICROgram(s) HFA Inhaler 1 Puff(s) Inhalation every 6 hours  norepinephrine Infusion 0.05 MICROgram(s)/kG/Min IV Continuous <Continuous>  pantoprazole  Injectable 40 milliGRAM(s) IV Push daily  polyethylene glycol 3350 17 Gram(s) Oral at bedtime  predniSONE   Tablet 10 milliGRAM(s) Oral daily  propofol Infusion 10 MICROgram(s)/kG/Min IV Continuous <Continuous>  senna Syrup 10 milliLiter(s) Oral at bedtime  valproic  acid Syrup 500 milliGRAM(s) Oral two times a day  voriconazole IVPB 300 milliGRAM(s) IV Intermittent every 12 hours      PMHX/PSHX:  History of pneumothorax  COPD (chronic obstructive pulmonary disease)  Asthma  Hypertension  Sarcoidosis  History of thoracotomy  No significant past surgical history      Family history:  Family history of pancreatic cancer  Family history of essential hypertension (Father)      ROS:     unable to assess      PHYSICAL EXAM:     General: NAD, well-developed  Eyes: EOMI, PERRLA, conjunctiva and sclera clear  Neck: Supple, No JVD  Chest/Lung: Clear to auscultation bilaterally on ant exam; No wheezes  Heart: Regular rate and rhythm; No murmurs, rubs, or gallops. Capillary refill WNL  Abdomen: Soft, Nontender, Nondistended; Bowel sounds present, peg tube in place  Extremities: (+) anasarca  Psych: AAOx3  Neurology: non-focal, follows commands, able to move all extrems, able to communicate.   Skin: No rashes or lesions    Vital Signs:  Vital Signs Last 24 Hrs  T(C): 37.5 (06 Dec 2017 04:00), Max: 37.8 (06 Dec 2017 00:00)  T(F): 99.5 (06 Dec 2017 04:00), Max: 100 (06 Dec 2017 00:00)  HR: 106 (06 Dec 2017 07:27) (96 - 118)  BP: 95/61 (06 Dec 2017 07:00) (83/50 - 138/81)  BP(mean): 68 (06 Dec 2017 07:00) (58 - 101)  RR: 22 (06 Dec 2017 07:00) (8 - 33)  SpO2: 100% (06 Dec 2017 07:27) (97% - 100%)  Daily     Daily Weight in k.3 (06 Dec 2017 04:00)    LABS:                        8.3    9.51  )-----------( 186      ( 06 Dec 2017 02:37 )             27.7     12-06    148<H>  |  96<L>  |  9   ----------------------------<  92  4.4   |  45<HH>  |  0.78    Ca    9.0      06 Dec 2017 02:37  Phos  4.0     12-06  Mg     1.9     12-06        PT/INR - ( 05 Dec 2017 03:55 )   PT: 14.3 SEC;   INR: 1.27          PTT - ( 05 Dec 2017 03:55 )  PTT:28.1 SEC        Imaging:      < from: Upper Endoscopy (17 @ 10:58) >  Impression:          - No gross lesions in esophagus.                       - No gross lesions in the stomach.                       - No gross lesions in duodenum.                       - A PEG placement was successfully completed.                       - No specimens collected.  Recommendation:      - Observe patient in MICU for ongoing care.                       - Please follow the post-PEG recommendations including:                        change dressing once per day, NPO x4 hrs then water                        today and may use PEG tomorrow for feedings.    < end of copied text > Chief Complaint:  Patient is a 47y old  Male who presents with a chief complaint of hemoptysis (2017 00:32)      Interval Events:   Pt had EGD with peg tube placement yesterday. He tolerated the procedure well.       Allergies:  No Known Allergies      Hospital Medications:  acetaminophen   Tablet 650 milliGRAM(s) Oral every 6 hours PRN  ALBUTerol    90 MICROgram(s) HFA Inhaler 2 Puff(s) Inhalation every 6 hours  buDESOnide 160 MICROgram(s)/formoterol 4.5 MICROgram(s) Inhaler 2 Puff(s) Inhalation two times a day  chlorhexidine 0.12% Liquid 15 milliLiter(s) Swish and Spit two times a day  chlorhexidine 4% Liquid 1 Application(s) Topical daily  diazepam    Tablet 2 milliGRAM(s) Oral two times a day  enoxaparin Injectable 40 milliGRAM(s) SubCutaneous daily  ferrous    sulfate Liquid 300 milliGRAM(s) Enteral Tube daily  folic acid 1 milliGRAM(s) Oral daily  insulin lispro (HumaLOG) corrective regimen sliding scale   SubCutaneous every 6 hours  ipratropium 17 MICROgram(s) HFA Inhaler 1 Puff(s) Inhalation every 6 hours  norepinephrine Infusion 0.05 MICROgram(s)/kG/Min IV Continuous <Continuous>  pantoprazole  Injectable 40 milliGRAM(s) IV Push daily  polyethylene glycol 3350 17 Gram(s) Oral at bedtime  predniSONE   Tablet 10 milliGRAM(s) Oral daily  propofol Infusion 10 MICROgram(s)/kG/Min IV Continuous <Continuous>  senna Syrup 10 milliLiter(s) Oral at bedtime  valproic  acid Syrup 500 milliGRAM(s) Oral two times a day  voriconazole IVPB 300 milliGRAM(s) IV Intermittent every 12 hours      PMHX/PSHX:  History of pneumothorax  COPD (chronic obstructive pulmonary disease)  Asthma  Hypertension  Sarcoidosis  History of thoracotomy  No significant past surgical history      Family history:  Family history of pancreatic cancer  Family history of essential hypertension (Father)      ROS:     unable to assess      PHYSICAL EXAM:     General: NAD, well-developed  Eyes: EOMI, PERRLA, conjunctiva and sclera clear  Neck: Supple, No JVD  Chest/Lung: Clear to auscultation bilaterally on ant exam; No wheezes  Heart: Regular rate and rhythm; No murmurs, rubs, or gallops. Capillary refill WNL  Abdomen: Soft, Nontender, Nondistended; Bowel sounds present, peg tube in place  Extremities: (+) anasarca  Psych: AAOx3  Neurology: non-focal, follows commands, able to move all extrems, able to communicate.   Skin: No rashes or lesions    Vital Signs:  Vital Signs Last 24 Hrs  T(C): 37.5 (06 Dec 2017 04:00), Max: 37.8 (06 Dec 2017 00:00)  T(F): 99.5 (06 Dec 2017 04:00), Max: 100 (06 Dec 2017 00:00)  HR: 106 (06 Dec 2017 07:27) (96 - 118)  BP: 95/61 (06 Dec 2017 07:00) (83/50 - 138/81)  BP(mean): 68 (06 Dec 2017 07:00) (58 - 101)  RR: 22 (06 Dec 2017 07:00) (8 - 33)  SpO2: 100% (06 Dec 2017 07:27) (97% - 100%)  Daily     Daily Weight in k.3 (06 Dec 2017 04:00)    LABS:                        8.3    9.51  )-----------( 186      ( 06 Dec 2017 02:37 )             27.7     12-06    148<H>  |  96<L>  |  9   ----------------------------<  92  4.4   |  45<HH>  |  0.78    Ca    9.0      06 Dec 2017 02:37  Phos  4.0     12-06  Mg     1.9     12-06        PT/INR - ( 05 Dec 2017 03:55 )   PT: 14.3 SEC;   INR: 1.27          PTT - ( 05 Dec 2017 03:55 )  PTT:28.1 SEC        Imaging:      < from: Upper Endoscopy (17 @ 10:58) >  Impression:          - No gross lesions in esophagus.                       - No gross lesions in the stomach.                       - No gross lesions in duodenum.                       - A PEG placement was successfully completed.                       - No specimens collected.  Recommendation:      - Observe patient in MICU for ongoing care.                       - Please follow the post-PEG recommendations including:                        change dressing once per day, NPO x4 hrs then water                        today and may use PEG tomorrow for feedings.

## 2017-12-06 NOTE — PROGRESS NOTE ADULT - PROBLEM SELECTOR PLAN 7
s/p pleurodesis.  11/10 s/p pleurodesis. Management defer to CTS  11/11 no intervention for now. per CTS.  11/13: has mod pneumothorax stable: defer to thoracic for any intervention!  11/12 no intervention planned. bronchoscopy in future per CTS  11/14: Pt still has left sided pneumothorax: s/p blood patch as well as the talc pleurodesis on the left side: Hard to do any other intervention : would defer to thoracic surgical team!!  11/18 management per CTS. not plan for Chest tube at this moment.  11/19 stable radiographically and clinically. Management defer to CTS  11/22: Has this air space in the left lower lobe following DAVID lobectomy: ? PTX, empty space ? any intervention would be difficult: would defer to ct surgery!!  11/27/2017: for rpt ct scan chest now  1130: s/p chest tube placement on left side: yesterdays x-ray no full lung reexpansion:  12/01/2017: with only slight reexpansion of the ptx on left side after a small chest tube!!  12/2 radiographically improved. Chest Tube (+)  12/3 stable. Chest tube (+)  12/04: has pesitent ptx on the left side: has BPF too  12/6: no improvement in ptx following chest tube: remains same even after falling of chest tube!

## 2017-12-07 LAB
ALBUMIN SERPL ELPH-MCNC: 2.4 G/DL — LOW (ref 3.3–5)
ALP SERPL-CCNC: 67 U/L — SIGNIFICANT CHANGE UP (ref 40–120)
ALT FLD-CCNC: 8 U/L — SIGNIFICANT CHANGE UP (ref 4–41)
AST SERPL-CCNC: 16 U/L — SIGNIFICANT CHANGE UP (ref 4–40)
BASOPHILS # BLD AUTO: 0.01 K/UL — SIGNIFICANT CHANGE UP (ref 0–0.2)
BASOPHILS NFR BLD AUTO: 0.1 % — SIGNIFICANT CHANGE UP (ref 0–2)
BILIRUB SERPL-MCNC: 0.2 MG/DL — SIGNIFICANT CHANGE UP (ref 0.2–1.2)
BUN SERPL-MCNC: 10 MG/DL — SIGNIFICANT CHANGE UP (ref 7–23)
CALCIUM SERPL-MCNC: 8.9 MG/DL — SIGNIFICANT CHANGE UP (ref 8.4–10.5)
CHLORIDE SERPL-SCNC: 93 MMOL/L — LOW (ref 98–107)
CO2 SERPL-SCNC: 44 MMOL/L — HIGH (ref 22–31)
CREAT SERPL-MCNC: 0.67 MG/DL — SIGNIFICANT CHANGE UP (ref 0.5–1.3)
EOSINOPHIL # BLD AUTO: 0.12 K/UL — SIGNIFICANT CHANGE UP (ref 0–0.5)
EOSINOPHIL NFR BLD AUTO: 1.1 % — SIGNIFICANT CHANGE UP (ref 0–6)
GLUCOSE BLDC GLUCOMTR-MCNC: 74 MG/DL — SIGNIFICANT CHANGE UP (ref 70–99)
GLUCOSE BLDC GLUCOMTR-MCNC: 84 MG/DL — SIGNIFICANT CHANGE UP (ref 70–99)
GLUCOSE BLDC GLUCOMTR-MCNC: 85 MG/DL — SIGNIFICANT CHANGE UP (ref 70–99)
GLUCOSE BLDC GLUCOMTR-MCNC: 96 MG/DL — SIGNIFICANT CHANGE UP (ref 70–99)
GLUCOSE SERPL-MCNC: 91 MG/DL — SIGNIFICANT CHANGE UP (ref 70–99)
HCT VFR BLD CALC: 25.6 % — LOW (ref 39–50)
HGB BLD-MCNC: 8.1 G/DL — LOW (ref 13–17)
IMM GRANULOCYTES # BLD AUTO: 0.06 # — SIGNIFICANT CHANGE UP
IMM GRANULOCYTES NFR BLD AUTO: 0.6 % — SIGNIFICANT CHANGE UP (ref 0–1.5)
LYMPHOCYTES # BLD AUTO: 1.5 K/UL — SIGNIFICANT CHANGE UP (ref 1–3.3)
LYMPHOCYTES # BLD AUTO: 14.3 % — SIGNIFICANT CHANGE UP (ref 13–44)
MAGNESIUM SERPL-MCNC: 1.8 MG/DL — SIGNIFICANT CHANGE UP (ref 1.6–2.6)
MCHC RBC-ENTMCNC: 30.8 PG — SIGNIFICANT CHANGE UP (ref 27–34)
MCHC RBC-ENTMCNC: 31.6 % — LOW (ref 32–36)
MCV RBC AUTO: 97.3 FL — SIGNIFICANT CHANGE UP (ref 80–100)
MONOCYTES # BLD AUTO: 1.02 K/UL — HIGH (ref 0–0.9)
MONOCYTES NFR BLD AUTO: 9.7 % — SIGNIFICANT CHANGE UP (ref 2–14)
NEUTROPHILS # BLD AUTO: 7.81 K/UL — HIGH (ref 1.8–7.4)
NEUTROPHILS NFR BLD AUTO: 74.2 % — SIGNIFICANT CHANGE UP (ref 43–77)
NRBC # FLD: 0 — SIGNIFICANT CHANGE UP
PHOSPHATE SERPL-MCNC: 3.6 MG/DL — SIGNIFICANT CHANGE UP (ref 2.5–4.5)
PLATELET # BLD AUTO: 175 K/UL — SIGNIFICANT CHANGE UP (ref 150–400)
PMV BLD: 12 FL — SIGNIFICANT CHANGE UP (ref 7–13)
POTASSIUM SERPL-MCNC: 3.8 MMOL/L — SIGNIFICANT CHANGE UP (ref 3.5–5.3)
POTASSIUM SERPL-SCNC: 3.8 MMOL/L — SIGNIFICANT CHANGE UP (ref 3.5–5.3)
PROT SERPL-MCNC: 5.7 G/DL — LOW (ref 6–8.3)
RBC # BLD: 2.63 M/UL — LOW (ref 4.2–5.8)
RBC # FLD: 15.3 % — HIGH (ref 10.3–14.5)
SODIUM SERPL-SCNC: 144 MMOL/L — SIGNIFICANT CHANGE UP (ref 135–145)
WBC # BLD: 10.52 K/UL — HIGH (ref 3.8–10.5)
WBC # FLD AUTO: 10.52 K/UL — HIGH (ref 3.8–10.5)

## 2017-12-07 PROCEDURE — 99291 CRITICAL CARE FIRST HOUR: CPT

## 2017-12-07 RX ORDER — FUROSEMIDE 40 MG
40 TABLET ORAL ONCE
Qty: 0 | Refills: 0 | Status: COMPLETED | OUTPATIENT
Start: 2017-12-07 | End: 2017-12-07

## 2017-12-07 RX ORDER — DIAZEPAM 5 MG
2 TABLET ORAL
Qty: 0 | Refills: 0 | Status: DISCONTINUED | OUTPATIENT
Start: 2017-12-07 | End: 2017-12-08

## 2017-12-07 RX ORDER — BUDESONIDE, MICRONIZED 100 %
1 POWDER (GRAM) MISCELLANEOUS
Qty: 0 | Refills: 0 | Status: DISCONTINUED | OUTPATIENT
Start: 2017-12-07 | End: 2017-12-08

## 2017-12-07 RX ORDER — MORPHINE SULFATE 50 MG/1
2 CAPSULE, EXTENDED RELEASE ORAL ONCE
Qty: 0 | Refills: 0 | Status: DISCONTINUED | OUTPATIENT
Start: 2017-12-07 | End: 2017-12-08

## 2017-12-07 RX ADMIN — Medication 1 PUFF(S): at 15:50

## 2017-12-07 RX ADMIN — BUDESONIDE AND FORMOTEROL FUMARATE DIHYDRATE 2 PUFF(S): 160; 4.5 AEROSOL RESPIRATORY (INHALATION) at 10:00

## 2017-12-07 RX ADMIN — Medication 27.5 MILLIGRAM(S): at 06:43

## 2017-12-07 RX ADMIN — ENOXAPARIN SODIUM 40 MILLIGRAM(S): 100 INJECTION SUBCUTANEOUS at 12:26

## 2017-12-07 RX ADMIN — ALBUTEROL 2 PUFF(S): 90 AEROSOL, METERED ORAL at 03:44

## 2017-12-07 RX ADMIN — CHLORHEXIDINE GLUCONATE 15 MILLILITER(S): 213 SOLUTION TOPICAL at 18:14

## 2017-12-07 RX ADMIN — Medication 650 MILLIGRAM(S): at 00:00

## 2017-12-07 RX ADMIN — Medication 10 MILLIGRAM(S): at 06:10

## 2017-12-07 RX ADMIN — Medication 1 PUFF(S): at 03:44

## 2017-12-07 RX ADMIN — Medication 650 MILLIGRAM(S): at 06:10

## 2017-12-07 RX ADMIN — Medication 1 PUFF(S): at 10:00

## 2017-12-07 RX ADMIN — ALBUTEROL 2 PUFF(S): 90 AEROSOL, METERED ORAL at 10:00

## 2017-12-07 RX ADMIN — BUDESONIDE AND FORMOTEROL FUMARATE DIHYDRATE 2 PUFF(S): 160; 4.5 AEROSOL RESPIRATORY (INHALATION) at 22:06

## 2017-12-07 RX ADMIN — Medication 2 MILLIGRAM(S): at 18:12

## 2017-12-07 RX ADMIN — ALBUTEROL 2 PUFF(S): 90 AEROSOL, METERED ORAL at 15:50

## 2017-12-07 RX ADMIN — PANTOPRAZOLE SODIUM 40 MILLIGRAM(S): 20 TABLET, DELAYED RELEASE ORAL at 12:27

## 2017-12-07 RX ADMIN — Medication 650 MILLIGRAM(S): at 06:45

## 2017-12-07 RX ADMIN — Medication 1 PUFF(S): at 22:06

## 2017-12-07 RX ADMIN — Medication 40 MILLIGRAM(S): at 10:53

## 2017-12-07 RX ADMIN — Medication 2 MILLIGRAM(S): at 06:09

## 2017-12-07 RX ADMIN — Medication 2 MILLIGRAM(S): at 22:18

## 2017-12-07 RX ADMIN — VORICONAZOLE 125 MILLIGRAM(S): 10 INJECTION, POWDER, LYOPHILIZED, FOR SOLUTION INTRAVENOUS at 12:27

## 2017-12-07 RX ADMIN — CHLORHEXIDINE GLUCONATE 15 MILLILITER(S): 213 SOLUTION TOPICAL at 06:11

## 2017-12-07 RX ADMIN — Medication 300 MILLIGRAM(S): at 12:26

## 2017-12-07 RX ADMIN — Medication 1 MILLIGRAM(S): at 12:26

## 2017-12-07 RX ADMIN — CHLORHEXIDINE GLUCONATE 1 APPLICATION(S): 213 SOLUTION TOPICAL at 18:13

## 2017-12-07 RX ADMIN — ALBUTEROL 2 PUFF(S): 90 AEROSOL, METERED ORAL at 22:06

## 2017-12-07 RX ADMIN — Medication 27.5 MILLIGRAM(S): at 18:12

## 2017-12-07 NOTE — PHYSICAL THERAPY INITIAL EVALUATION ADULT - PERTINENT HX OF CURRENT PROBLEM, REHAB EVAL
47 year old male admitted secondary to hemoptysis; PMh: peneumothorax, COPD, sarcoidosis; PT eval for optimize strength and function
This is a 47 year old male with hemoptysis.

## 2017-12-07 NOTE — PROGRESS NOTE ADULT - SUBJECTIVE AND OBJECTIVE BOX
CHIEF COMPLAINT:  Patient is a 47y old  Male who presents with a chief complaint of hemoptysis (09 Nov 2017 00:32)    HPI:  47 year old male with a PMHx of sarcoidosis, HTN, asthma, and COPD presents to the ED with severe leg spasms and headache leading to a presyncopal episode, found to have a significant abnormal EKG and moderate inspiratory and expiratory wheezing in the setting of Entero/Rhino virus, admitted to tele for Near Syncope, r/o ACS and COPD exacerbation.  Hospital course complicated by stroke and PAF- AC on hold due to hemoptysis; 9/21-FB/BAL done by Dr. Arias. Intraop: Bleeding from DAVID (non-lingular)-Recom IR to embolize; 9/22 CT angio done per IR Lobko request.  Formal PFTS and ask Card to fix PFO now to decrease blood flow; 9/24- Developed worsening hemoptysis. Emergently kristel to OR and had  Left thoractomy DAVID. Brought to CTICU.  9/24: 1400ml EBL in OR; 10 u PRBCs;9/25-Brought back to OR for bleeding. Had Reop Thoracotomy, evac left hemothorax. CTI- DAVID space despite -40 Sxn, MEYER, L Hemiparesis (CVA) better w walking; Patient post op with prolongd air leak shila ptx, bedside pleurodesis done.  Developed SIRS after pleurodesis-> CTICU; On pressors. Pt. clinically improved in CTICU. Maintained on Steroids, weaned down to Solumedrol BID. Off pressors and transitioned to Midodrine. Still with thrombocytopenia but no bleeding. Continued on Eliquis but no ASA for h/o CVA. Neuro deficits improving.  Air leak resolved on 10/27. 10/30 Chest tube removed, cxr stable.  Steriods to po taper.  Patient stable for discharge.  11/2-Pt ambulating w PT frequently. Feels well. Pain controlled. All labs and vital signs stable. Wound healing. Arnold po steroid taper. To complete Voriconazole on Nov 4th. CXRs and reports reviewed by and cleared by Dr. Caldera. Pt. to fu for PFO closure as output. Still requires intermittent oxygen. Cleared for discharge by Dr. Caldera to acute rehab.    Patient presented to [place] [facility] after rehab send patient with hemoptysis, patient had 2 episodes of hemoptysis, less than 10cc in total for 24 hours.  Patient states it happens when he coughs, blood tinged sputum.  Patient otherwise doing well in rehab. (09 Nov 2017 00:32)    Interval Events:      REVIEW OF SYSTEMS:  Limited by patient with trach  Denies difficulty breathing, CP, abdom pain, LE pain.     OBJECTIVE:  ICU Vital Signs Last 24 Hrs  T(C): 37.1 (07 Dec 2017 04:00), Max: 37.1 (06 Dec 2017 20:00)  T(F): 98.8 (07 Dec 2017 04:00), Max: 98.8 (06 Dec 2017 20:00)  HR: 88 (07 Dec 2017 07:42) (83 - 113)  BP: 130/87 (07 Dec 2017 06:00) (104/77 - 139/95)  BP(mean): 98 (07 Dec 2017 06:00) (75 - 104)  ABP: --  ABP(mean): --  RR: 14 (07 Dec 2017 06:00) (10 - 30)  SpO2: 100% (07 Dec 2017 07:42) (94% - 100%)    Mode: AC/ CMV (Assist Control/ Continuous Mandatory Ventilation), RR (machine): 8, TV (machine): 500, FiO2: 40, PEEP: 5, MAP: 10, PIP: 33    12-06 @ 07:01  -  12-07 @ 07:00  --------------------------------------------------------  IN: 622 mL / OUT: 2430 mL / NET: -1808 mL      CAPILLARY BLOOD GLUCOSE      POCT Blood Glucose.: 74 mg/dL (07 Dec 2017 06:15)      PHYSICAL EXAM:  General: NAD, well-developed  Eyes: EOMI, PERRLA, conjunctiva and sclera clear  Neck: Supple, No JVD  Chest/Lung: Clear to auscultation bilaterally; No wheezes  Heart: Regular rate and rhythm; No murmurs, rubs, or gallops. Capillary refill WNL  Abdome: Soft, Nontender, Nondistended; Bowel sounds present  Extremities: No lower extremity edema.   Psych: AAOx3  Neurology: non-focal, strength and sensation grossly intact UE and LE BL. No spinal TTP  Skin: No rashes or lesions    LINES:    HOSPITAL MEDICATIONS:  enoxaparin Injectable 40 milliGRAM(s) SubCutaneous daily    voriconazole IVPB 300 milliGRAM(s) IV Intermittent every 12 hours      insulin lispro (HumaLOG) corrective regimen sliding scale   SubCutaneous every 6 hours  predniSONE   Tablet 10 milliGRAM(s) Oral daily    ALBUTerol    90 MICROgram(s) HFA Inhaler 2 Puff(s) Inhalation every 6 hours  buDESOnide 160 MICROgram(s)/formoterol 4.5 MICROgram(s) Inhaler 2 Puff(s) Inhalation two times a day  ipratropium 17 MICROgram(s) HFA Inhaler 1 Puff(s) Inhalation every 6 hours    acetaminophen    Suspension. 650 milliGRAM(s) Oral every 6 hours  acetaminophen   Tablet 650 milliGRAM(s) Oral every 6 hours PRN  diazepam    Tablet 2 milliGRAM(s) Oral two times a day  fentaNYL   Patch  50 MICROgram(s)/Hr 1 Patch Transdermal every 72 hours  valproate sodium IVPB 500 milliGRAM(s) IV Intermittent every 12 hours    pantoprazole  Injectable 40 milliGRAM(s) IV Push daily  polyethylene glycol 3350 17 Gram(s) Oral at bedtime  senna Syrup 10 milliLiter(s) Oral at bedtime        ferrous    sulfate Liquid 300 milliGRAM(s) Enteral Tube daily  folic acid 1 milliGRAM(s) Oral daily      chlorhexidine 0.12% Liquid 15 milliLiter(s) Swish and Spit two times a day  chlorhexidine 4% Liquid 1 Application(s) Topical daily        LABS:                        8.1    10.52 )-----------( 175      ( 07 Dec 2017 03:10 )             25.6     Hgb Trend: 8.1<--, 8.3<--, 8.6<--, 8.3<--, 7.6<--  12-07    144  |  93<L>  |  10  ----------------------------<  91  3.8   |  44<H>  |  0.67    Ca    8.9      07 Dec 2017 03:10  Phos  3.6     12-07  Mg     1.8     12-07    TPro  5.7<L>  /  Alb  2.4<L>  /  TBili  0.2  /  DBili  x   /  AST  16  /  ALT  8   /  AlkPhos  67  12-07    Creatinine Trend: 0.67<--, 0.78<--, 0.77<--, 0.61<--, 0.59<--, 0.79<--      Arterial Blood Gas:  12-05 @ 20:07  7.42/67/152/41/99.6/17.2  ABG lactate: 0.5  Arterial Blood Gas:  12-05 @ 08:32  7.41/69/165/41/99.5/18.0  ABG lactate: 0.7        MICROBIOLOGY:     RADIOLOGY:  [ ] Reviewed and interpreted by me    EKG: CHIEF COMPLAINT:  Patient is a 47y old  Male who presents with a chief complaint of hemoptysis (09 Nov 2017 00:32)    HPI:  47 year old male with a PMHx of sarcoidosis, HTN, asthma, and COPD presents to the ED with severe leg spasms and headache leading to a presyncopal episode, found to have a significant abnormal EKG and moderate inspiratory and expiratory wheezing in the setting of Entero/Rhino virus, admitted to tele for Near Syncope, r/o ACS and COPD exacerbation.  Hospital course complicated by stroke and PAF- AC on hold due to hemoptysis; 9/21-FB/BAL done by Dr. Arias. Intraop: Bleeding from DAVID (non-lingular)-Recom IR to embolize; 9/22 CT angio done per IR Lobko request.  Formal PFTS and ask Card to fix PFO now to decrease blood flow; 9/24- Developed worsening hemoptysis. Emergently kristel to OR and had  Left thoractomy DAVID. Brought to CTICU.  9/24: 1400ml EBL in OR; 10 u PRBCs;9/25-Brought back to OR for bleeding. Had Reop Thoracotomy, evac left hemothorax. CTI- DAVID space despite -40 Sxn, MEYER, L Hemiparesis (CVA) better w walking; Patient post op with prolongd air leak shila ptx, bedside pleurodesis done.  Developed SIRS after pleurodesis-> CTICU; On pressors. Pt. clinically improved in CTICU. Maintained on Steroids, weaned down to Solumedrol BID. Off pressors and transitioned to Midodrine. Still with thrombocytopenia but no bleeding. Continued on Eliquis but no ASA for h/o CVA. Neuro deficits improving.  Air leak resolved on 10/27. 10/30 Chest tube removed, cxr stable.  Steriods to po taper.  Patient stable for discharge.  11/2-Pt ambulating w PT frequently. Feels well. Pain controlled. All labs and vital signs stable. Wound healing. Arnold po steroid taper. To complete Voriconazole on Nov 4th. CXRs and reports reviewed by and cleared by Dr. Caldera. Pt. to fu for PFO closure as output. Still requires intermittent oxygen. Cleared for discharge by Dr. Caldera to acute rehab.    Patient presented to [place] ER after rehab send patient with hemoptysis, patient had 2 episodes of hemoptysis, less than 10cc in total for 24 hours.  Patient states it happens when he coughs, blood tinged sputum.  Patient otherwise doing well in rehab. (09 Nov 2017 00:32)    Interval Events:    Pt was agitated overnight, responded well to suction of ET tube, tolerating meds through peg.     REVIEW OF SYSTEMS:  Limited by patient with trach  Denies difficulty breathing, CP, abdom pain, LE pain.     OBJECTIVE:  ICU Vital Signs Last 24 Hrs  T(C): 37.1 (07 Dec 2017 04:00), Max: 37.1 (06 Dec 2017 20:00)  T(F): 98.8 (07 Dec 2017 04:00), Max: 98.8 (06 Dec 2017 20:00)  HR: 88 (07 Dec 2017 07:42) (83 - 113)  BP: 130/87 (07 Dec 2017 06:00) (104/77 - 139/95)  BP(mean): 98 (07 Dec 2017 06:00) (75 - 104)  ABP: --  ABP(mean): --  RR: 14 (07 Dec 2017 06:00) (10 - 30)  SpO2: 100% (07 Dec 2017 07:42) (94% - 100%)    Mode: AC/ CMV (Assist Control/ Continuous Mandatory Ventilation), RR (machine): 8, TV (machine): 500, FiO2: 40, PEEP: 5, MAP: 10, PIP: 33    12-06 @ 07:01  -  12-07 @ 07:00  --------------------------------------------------------  IN: 622 mL / OUT: 2430 mL / NET: -1808 mL      CAPILLARY BLOOD GLUCOSE      POCT Blood Glucose.: 74 mg/dL (07 Dec 2017 06:15)      PHYSICAL EXAM:  General: NAD, well-developed  Eyes: EOMI, PERRLA, conjunctiva and sclera clear  Neck: Supple, No JVD  Chest/Lung: Course BS BL.   Heart: Regular rate and rhythm; No murmurs, rubs, or gallops. Capillary refill WNL  Abdome: Soft, Nontender, Nondistended; Bowel sounds present  Extremities: 2+ lower extremity edema, improved. BL hand swelling L > R improved.   Psych: AAOx3  Neurology: L sided weakness UE and LE 3/5, Right Upper Extremity and Right Lower Extremity 4/5. Sensation intact.   Skin: No rashes or lesions    LINES:    HOSPITAL MEDICATIONS:  enoxaparin Injectable 40 milliGRAM(s) SubCutaneous daily    voriconazole IVPB 300 milliGRAM(s) IV Intermittent every 12 hours      insulin lispro (HumaLOG) corrective regimen sliding scale   SubCutaneous every 6 hours  predniSONE   Tablet 10 milliGRAM(s) Oral daily    ALBUTerol    90 MICROgram(s) HFA Inhaler 2 Puff(s) Inhalation every 6 hours  buDESOnide 160 MICROgram(s)/formoterol 4.5 MICROgram(s) Inhaler 2 Puff(s) Inhalation two times a day  ipratropium 17 MICROgram(s) HFA Inhaler 1 Puff(s) Inhalation every 6 hours    acetaminophen    Suspension. 650 milliGRAM(s) Oral every 6 hours  acetaminophen   Tablet 650 milliGRAM(s) Oral every 6 hours PRN  diazepam    Tablet 2 milliGRAM(s) Oral two times a day  fentaNYL   Patch  50 MICROgram(s)/Hr 1 Patch Transdermal every 72 hours  valproate sodium IVPB 500 milliGRAM(s) IV Intermittent every 12 hours    pantoprazole  Injectable 40 milliGRAM(s) IV Push daily  polyethylene glycol 3350 17 Gram(s) Oral at bedtime  senna Syrup 10 milliLiter(s) Oral at bedtime        ferrous    sulfate Liquid 300 milliGRAM(s) Enteral Tube daily  folic acid 1 milliGRAM(s) Oral daily      chlorhexidine 0.12% Liquid 15 milliLiter(s) Swish and Spit two times a day  chlorhexidine 4% Liquid 1 Application(s) Topical daily        LABS:                        8.1    10.52 )-----------( 175      ( 07 Dec 2017 03:10 )             25.6     Hgb Trend: 8.1<--, 8.3<--, 8.6<--, 8.3<--, 7.6<--  12-07    144  |  93<L>  |  10  ----------------------------<  91  3.8   |  44<H>  |  0.67    Ca    8.9      07 Dec 2017 03:10  Phos  3.6     12-07  Mg     1.8     12-07    TPro  5.7<L>  /  Alb  2.4<L>  /  TBili  0.2  /  DBili  x   /  AST  16  /  ALT  8   /  AlkPhos  67  12-07    Creatinine Trend: 0.67<--, 0.78<--, 0.77<--, 0.61<--, 0.59<--, 0.79<--    Arterial Blood Gas:  12-05 @ 20:07  7.42/67/152/41/99.6/17.2  ABG lactate: 0.5  Arterial Blood Gas:  12-05 @ 08:32  7.41/69/165/41/99.5/18.0  ABG lactate: 0.7        MICROBIOLOGY:     RADIOLOGY:  [ ] Reviewed and interpreted by me    EKG: CHIEF COMPLAINT:  Patient is a 47y old  Male who presents with a chief complaint of hemoptysis (09 Nov 2017 00:32)    HPI:  47 year old male with a PMHx of sarcoidosis, HTN, asthma, and COPD presents to the ED with severe leg spasms and headache leading to a presyncopal episode, found to have a significant abnormal EKG and moderate inspiratory and expiratory wheezing in the setting of Entero/Rhino virus, admitted to tele for Near Syncope, r/o ACS and COPD exacerbation.  Hospital course complicated by stroke and PAF- AC on hold due to hemoptysis; 9/21-FB/BAL done by Dr. Arias. Intraop: Bleeding from DAVID (non-lingular)-Recom IR to embolize; 9/22 CT angio done per IR Lobko request.  Formal PFTS and ask Card to fix PFO now to decrease blood flow; 9/24- Developed worsening hemoptysis. Emergently kristel to OR and had  Left thoractomy DAVID. Brought to CTICU.  9/24: 1400ml EBL in OR; 10 u PRBCs;9/25-Brought back to OR for bleeding. Had Reop Thoracotomy, evac left hemothorax. CTI- DAVID space despite -40 Sxn, MEYER, L Hemiparesis (CVA) better w walking; Patient post op with prolongd air leak shila ptx, bedside pleurodesis done.  Developed SIRS after pleurodesis-> CTICU; On pressors. Pt. clinically improved in CTICU. Maintained on Steroids, weaned down to Solumedrol BID. Off pressors and transitioned to Midodrine. Still with thrombocytopenia but no bleeding. Continued on Eliquis but no ASA for h/o CVA. Neuro deficits improving.  Air leak resolved on 10/27. 10/30 Chest tube removed, cxr stable.  Steriods to po taper.  Patient stable for discharge.  11/2-Pt ambulating w PT frequently. Feels well. Pain controlled. All labs and vital signs stable. Wound healing. Arnold po steroid taper. To complete Voriconazole on Nov 4th. CXRs and reports reviewed by and cleared by Dr. Caldera. Pt. to fu for PFO closure as output. Still requires intermittent oxygen. Cleared for discharge by Dr. Caldera to acute rehab.    Patient presented to [place] ER after rehab send patient with hemoptysis, patient had 2 episodes of hemoptysis, less than 10cc in total for 24 hours.  Patient states it happens when he coughs, blood tinged sputum.  Patient otherwise doing well in rehab. (09 Nov 2017 00:32)    Interval Events:    Pt was agitated overnight, responded well to suction of ET tube, tolerating meds through peg.     REVIEW OF SYSTEMS:  Limited by patient with trach  Denies difficulty breathing, CP, abdom pain, LE pain.     OBJECTIVE:  ICU Vital Signs Last 24 Hrs  T(C): 37.1 (07 Dec 2017 04:00), Max: 37.1 (06 Dec 2017 20:00)  T(F): 98.8 (07 Dec 2017 04:00), Max: 98.8 (06 Dec 2017 20:00)  HR: 88 (07 Dec 2017 07:42) (83 - 113)  BP: 130/87 (07 Dec 2017 06:00) (104/77 - 139/95)  BP(mean): 98 (07 Dec 2017 06:00) (75 - 104)  ABP: --  ABP(mean): --  RR: 14 (07 Dec 2017 06:00) (10 - 30)  SpO2: 100% (07 Dec 2017 07:42) (94% - 100%)    Mode: AC/ CMV (Assist Control/ Continuous Mandatory Ventilation), RR (machine): 8, TV (machine): 500, FiO2: 40, PEEP: 5, MAP: 10, PIP: 33    12-06 @ 07:01  -  12-07 @ 07:00  --------------------------------------------------------  IN: 622 mL / OUT: 2430 mL / NET: -1808 mL      CAPILLARY BLOOD GLUCOSE      POCT Blood Glucose.: 74 mg/dL (07 Dec 2017 06:15)      PHYSICAL EXAM:  General: NAD, well-developed  Eyes: EOMI, PERRLA, conjunctiva and sclera clear  Neck: Supple, No JVD  Chest/Lung: Course BS BL.   Heart: Regular rate and rhythm; No murmurs, rubs, or gallops. Capillary refill WNL  Abdome: Soft, Nontender, Nondistended; Bowel sounds present  Extremities: 2+ lower extremity edema, improved. BL hand swelling L > R improved.   Psych: AAOx3  Neurology: L sided weakness UE and LE 3/5, Right Upper Extremity and Right Lower Extremity 4/5. Sensation intact.   Skin: No rashes or lesions    LINES:    HOSPITAL MEDICATIONS:  enoxaparin Injectable 40 milliGRAM(s) SubCutaneous daily    voriconazole IVPB 300 milliGRAM(s) IV Intermittent every 12 hours      insulin lispro (HumaLOG) corrective regimen sliding scale   SubCutaneous every 6 hours  predniSONE   Tablet 10 milliGRAM(s) Oral daily    ALBUTerol    90 MICROgram(s) HFA Inhaler 2 Puff(s) Inhalation every 6 hours  buDESOnide 160 MICROgram(s)/formoterol 4.5 MICROgram(s) Inhaler 2 Puff(s) Inhalation two times a day  ipratropium 17 MICROgram(s) HFA Inhaler 1 Puff(s) Inhalation every 6 hours    acetaminophen    Suspension. 650 milliGRAM(s) Oral every 6 hours  acetaminophen   Tablet 650 milliGRAM(s) Oral every 6 hours PRN  diazepam    Tablet 2 milliGRAM(s) Oral two times a day  fentaNYL   Patch  50 MICROgram(s)/Hr 1 Patch Transdermal every 72 hours  valproate sodium IVPB 500 milliGRAM(s) IV Intermittent every 12 hours    pantoprazole  Injectable 40 milliGRAM(s) IV Push daily  polyethylene glycol 3350 17 Gram(s) Oral at bedtime  senna Syrup 10 milliLiter(s) Oral at bedtime        ferrous    sulfate Liquid 300 milliGRAM(s) Enteral Tube daily  folic acid 1 milliGRAM(s) Oral daily      chlorhexidine 0.12% Liquid 15 milliLiter(s) Swish and Spit two times a day  chlorhexidine 4% Liquid 1 Application(s) Topical daily        LABS:                        8.1    10.52 )-----------( 175      ( 07 Dec 2017 03:10 )             25.6     Hgb Trend: 8.1<--, 8.3<--, 8.6<--, 8.3<--, 7.6<--  12-07    144  |  93<L>  |  10  ----------------------------<  91  3.8   |  44<H>  |  0.67    Ca    8.9      07 Dec 2017 03:10  Phos  3.6     12-07  Mg     1.8     12-07    TPro  5.7<L>  /  Alb  2.4<L>  /  TBili  0.2  /  DBili  x   /  AST  16  /  ALT  8   /  AlkPhos  67  12-07    Creatinine Trend: 0.67<--, 0.78<--, 0.77<--, 0.61<--, 0.59<--, 0.79<--    Arterial Blood Gas:  12-05 @ 20:07  7.42/67/152/41/99.6/17.2  ABG lactate: 0.5  Arterial Blood Gas:  12-05 @ 08:32  7.41/69/165/41/99.5/18.0  ABG lactate: 0.7      MICROBIOLOGY:     RADIOLOGY:  [ ] Reviewed and interpreted by me

## 2017-12-07 NOTE — PHYSICAL THERAPY INITIAL EVALUATION ADULT - MANUAL MUSCLE TESTING RESULTS, REHAB EVAL
Bilateral UE muscle strength grossly 2-/5 Throughout; Bilateral LE muscle strength grossly 2-/5 Throughout.
grossly assessed due to/3-/5 t/o on LUE 3/5 on robinson LE's/R UE 3/5

## 2017-12-07 NOTE — PHYSICAL THERAPY INITIAL EVALUATION ADULT - PLANNED THERAPY INTERVENTIONS, PT EVAL
transfer training/bed mobility training/gait training/postural re-education/strengthening/balance training
gait training/strengthening/transfer training/bed mobility training

## 2017-12-07 NOTE — PHYSICAL THERAPY INITIAL EVALUATION ADULT - IMPAIRMENTS FOUND, PT EVAL
aerobic capacity/endurance/muscle strength/gait, locomotion, and balance/posture
aerobic capacity/endurance/gait, locomotion, and balance

## 2017-12-07 NOTE — PHYSICAL THERAPY INITIAL EVALUATION ADULT - LIVES WITH, PROFILE
significant other/family
spouse/in an apartment , no steps to enter, came from rehab upon this admission

## 2017-12-07 NOTE — PHYSICAL THERAPY INITIAL EVALUATION ADULT - RANGE OF MOTION EXAMINATION, REHAB EVAL
bilateral lower extremity ROM was WFL (within functional limits)/bilateral upper extremity ROM was WFL (within functional limits)/Active Assistive ROM WFL throughout Bilateral Extremities

## 2017-12-07 NOTE — PHYSICAL THERAPY INITIAL EVALUATION ADULT - CRITERIA FOR SKILLED THERAPEUTIC INTERVENTIONS
rehab potential/impairments found/functional limitations in following categories/anticipated D/C to rehab facility to addres current functional limitation to optimize safety to allow pt. to return to their prior level of function./anticipated discharge recommendation
rehab potential/impairments found

## 2017-12-07 NOTE — PROGRESS NOTE ADULT - SUBJECTIVE AND OBJECTIVE BOX
Patient is a 47y old  Male who presents with a chief complaint of hemoptysis (09 Nov 2017 00:32)  pt remains on vent : failing weaning  for upsizing of trach today     Any change in ROS:     MEDICATIONS  (STANDING):  acetaminophen    Suspension. 650 milliGRAM(s) Oral every 6 hours  ALBUTerol    90 MICROgram(s) HFA Inhaler 2 Puff(s) Inhalation every 6 hours  buDESOnide   90 MICROgram(s) Inhaler 1 Puff(s) Inhalation two times a day  buDESOnide 160 MICROgram(s)/formoterol 4.5 MICROgram(s) Inhaler 2 Puff(s) Inhalation two times a day  chlorhexidine 0.12% Liquid 15 milliLiter(s) Swish and Spit two times a day  chlorhexidine 4% Liquid 1 Application(s) Topical daily  diazepam    Tablet 2 milliGRAM(s) Oral two times a day  enoxaparin Injectable 40 milliGRAM(s) SubCutaneous daily  fentaNYL   Patch  50 MICROgram(s)/Hr 1 Patch Transdermal every 72 hours  ferrous    sulfate Liquid 300 milliGRAM(s) Enteral Tube daily  folic acid 1 milliGRAM(s) Oral daily  insulin lispro (HumaLOG) corrective regimen sliding scale   SubCutaneous every 6 hours  ipratropium 17 MICROgram(s) HFA Inhaler 1 Puff(s) Inhalation every 6 hours  pantoprazole  Injectable 40 milliGRAM(s) IV Push daily  polyethylene glycol 3350 17 Gram(s) Oral at bedtime  predniSONE   Tablet 10 milliGRAM(s) Oral daily  senna Syrup 10 milliLiter(s) Oral at bedtime  valproate sodium IVPB 500 milliGRAM(s) IV Intermittent every 12 hours  voriconazole IVPB 300 milliGRAM(s) IV Intermittent every 12 hours    MEDICATIONS  (PRN):  acetaminophen   Tablet 650 milliGRAM(s) Oral every 6 hours PRN For Temp greater than 38 C (100.4 F)    Vital Signs Last 24 Hrs  T(C): 37.2 (07 Dec 2017 12:00), Max: 37.2 (07 Dec 2017 12:00)  T(F): 98.9 (07 Dec 2017 12:00), Max: 98.9 (07 Dec 2017 12:00)  HR: 94 (07 Dec 2017 15:50) (74 - 106)  BP: 129/91 (07 Dec 2017 14:00) (116/67 - 143/87)  BP(mean): 96 (07 Dec 2017 14:00) (75 - 99)  RR: 19 (07 Dec 2017 14:00) (10 - 24)  SpO2: 100% (07 Dec 2017 15:50) (94% - 100%)  Mode: AC/ CMV (Assist Control/ Continuous Mandatory Ventilation)  RR (machine): 8  TV (machine): 500  FiO2: 40  PEEP: 5  MAP: 11  PIP: 33    I&O's Summary    06 Dec 2017 07:01  -  07 Dec 2017 07:00  --------------------------------------------------------  IN: 622 mL / OUT: 2430 mL / NET: -1808 mL    07 Dec 2017 07:01  -  07 Dec 2017 16:52  --------------------------------------------------------  IN: 260 mL / OUT: 950 mL / NET: -690 mL          Physical Exam:   GENERAL: NAD, well-groomed, well-developed  HEENT: GIOVANI/   Atraumatic, Normocephalic  ENMT: No tonsillar erythema, exudates, or enlargement; Moist mucous membranes, Good dentition, No lesions  NECK: Supple, No JVD, Normal thyroid  CHEST/LUNG: Coarse  breath sounds   CVS: Regular rate and rhythm; No murmurs, rubs, or gallops  GI: : Soft, Nontender, Nondistended; Bowel sounds present  NERVOUS SYSTEM:  awake  EXTREMITIES:  2+  edema  LYMPH: No lymphadenopathy noted  SKIN: No rashes or lesions  ENDOCRINOLOGY: No Thyromegaly  PSYCH: Appropriate    Labs:  ABG - ( 05 Dec 2017 20:07 )  pH: 7.42  /  pCO2: 67    /  pO2: 152   / HCO3: 41    / Base Excess: 17.2  /  SaO2: 99.6                            8.1    10.52 )-----------( 175      ( 07 Dec 2017 03:10 )             25.6                         8.3    9.51  )-----------( 186      ( 06 Dec 2017 02:37 )             27.7                         8.6    12.67 )-----------( 181      ( 05 Dec 2017 17:25 )             28.4                         8.3    21.13 )-----------( 217      ( 05 Dec 2017 03:55 )             27.1                         7.6    8.16  )-----------( 170      ( 04 Dec 2017 03:30 )             23.7     12-07    144  |  93<L>  |  10  ----------------------------<  91  3.8   |  44<H>  |  0.67  12-06    148<H>  |  96<L>  |  9   ----------------------------<  92  4.4   |  45<HH>  |  0.78  12-05    141  |  93<L>  |  10  ----------------------------<  96  4.2   |  40<H>  |  0.77  12-04    140  |  93<L>  |  14  ----------------------------<  110<H>  3.7   |  39<H>  |  0.61    Ca    8.9      07 Dec 2017 03:10  Ca    9.0      06 Dec 2017 02:37  Phos  3.6     12-07  Phos  4.0     12-06  Mg     1.8     12-07  Mg     1.9     12-06    TPro  5.7<L>  /  Alb  2.4<L>  /  TBili  0.2  /  DBili  x   /  AST  16  /  ALT  8   /  AlkPhos  67  12-07    CAPILLARY BLOOD GLUCOSE      POCT Blood Glucose.: 85 mg/dL (07 Dec 2017 11:20)  POCT Blood Glucose.: 74 mg/dL (07 Dec 2017 06:15)  POCT Blood Glucose.: 81 mg/dL (06 Dec 2017 23:32)  POCT Blood Glucose.: 86 mg/dL (06 Dec 2017 17:17)      LIVER FUNCTIONS - ( 07 Dec 2017 03:10 )  Alb: 2.4 g/dL / Pro: 5.7 g/dL / ALK PHOS: 67 u/L / ALT: 8 u/L / AST: 16 u/L / GGT: x               Cultures:           Wound culture:                12-05 @ 18:22  Organism --  Culture w/ gram stain --  Specimen Source BLOOD PERIPHERAL    Wound culture:                12-01 @ 06:18  Organism --  Culture w/ gram stain --  Specimen Source BLOOD PERIPHERAL      Abscess culture:             12-05 @ 18:22  Organism --  Gram Stain --  Specimen Source BLOOD PERIPHERAL    Abscess culture:             12-01 @ 06:18  Organism --  Gram Stain --  Specimen Source BLOOD PERIPHERAL        Tissue culture:           12-05 @ 18:22  Organism --  Gram Stain --  Specimen Source BLOOD PERIPHERAL    Tissue culture:           12-01 @ 06:18  Organism --  Gram Stain --  Specimen Source BLOOD PERIPHERAL      Body Fluid Smear & Culture:                        12-05 @ 18:22  AFB Smear  --  Culture Acid Fast Body Fluid w/ Smear  --  Culture Acid Fast Smear Concentrated   --    Culture Results:     --  Specimen Source BLOOD PERIPHERAL    Body Fluid Smear & Culture:                        12-01 @ 06:18  AFB Smear  --  Culture Acid Fast Body Fluid w/ Smear  --  Culture Acid Fast Smear Concentrated   --    Culture Results:     --  Specimen Source BLOOD PERIPHERAL              Studies  Chest X-RAY  CT SCAN Chest   Venous Dopplers: LE:   CT Abdomen  Others    < from: CT Chest No Cont (12.04.17 @ 21:51) >  apical pneumothorax.    MEDIASTINUM AND PLEURA: Increased number of nonenlarged lymph nodes. The   visualized portion of the thyroid gland is unremarkable. Small bilateral   pleural effusions, greater on the right.          HEART AND VESSELS: The heart is normal in size.  There are no   atherosclerotic calcifications of the aorta.  There is no pericardial   effusion.         UPPER ABDOMEN: Images of the upper abdomen demonstrate no abnormality.     BONES AND SOFT TISSUES: Postsurgical rib changes.  The soft tissues are   unremarkable.    TUBES/LINES: Enteric tube tip in stomach.    IMPRESSION:   Large left apical pneumothorax is unchanged from the prior study.                  HOUSTON JARAMILLO M.D., ATTENDING RADIOLOGIST  This document has been electronically signed. Dec  6 2017 11:41AM    < end of copied text >

## 2017-12-07 NOTE — PHYSICAL THERAPY INITIAL EVALUATION ADULT - BED MOBILITY LIMITATIONS, REHAB EVAL
decreased ability to use legs for bridging/pushing/decreased ability to use arms for pushing/pulling/impaired ability to control trunk for mobility
Discharged

## 2017-12-07 NOTE — CHART NOTE - NSCHARTNOTEFT_GEN_A_CORE
PTA:  presumed stroke - started on apixaban  taken urgently to OR for DAVID pneumonectomy  persistent air leak with air trapping  then hemorrhagic conversion  went back to OR for that  then pleurodesis  pigtail in place  then removed end of october  went to rehab  11/9: Admitted to [facility]; Has few episodes of blood tinged sputum early, but in PM had 2 episodes of true hemoptysis; Scheduled for IR embolization/Consultation in am tomorrow  11/10: CTA obtained which revealed bleeding from staple line; Repeat episodes of hemoptysis during CTA; Cardiology consulted for closure of PFO; Need to f/u Neurology consultation re: stopping a/c for previous stroke (not on A/C right now)  11/8-11/20:  returned to hospital with recurrent hemoptysis  intubated, brought to CTICU  multiple bronchs with ablation by IR  no more bleeding  plan to be taken to [facility] for lung transplant  vent weaning  11/22: Required reintubation in AM  11/23: Plan for trach tomorrow. Full labs ordered. Monitor metabolic alkalosis  O/N: tracheal aspirate noted to have GPC clusters on GRAM STAIN with rare bacteria  11/24: F/U sputum culture. Remove a-line. D/c Pickens. Decrease Prednisone to 30. Trach performed at bedside today.  O/N: given valproic acid IV- d/n attemp OG as trach site with bleeding; slightly hypotensive overnight in 100s/90s MAPs in mid 60s to mid 70s, given 500cc bolus x1, attempt to transition to fentanyl patch 100mcg from drip; hypoglycemic given D50 x1 and started on D5 drip at 30, became hypoglycemic again, another D50x1, drip increased to 50. Mild bleeding at trach site. Now bleeding from nose (mucus mixed with blood)  11/25: Episodes of hypotension with unclear etiology. Net pos 500cc. Currently on propofol, Repeat blood cultures. Monitor bleeding. F/u respiratory cultures. Hold tube feeds. Possible OG tube vs PEG. Call GI monday. D5NS at 100 to correct hypoglycemia. Intermittently needs pressors. Chest PT. Febrile to 103 aroudn noon, already got 650 tylenol supp, gave 1g Ofirmev, cooling blanket. started on abx, ua/ucx, blood cx sent.   11/26: Back on feeds and sugars are better. Febrile yesterday TMax 103, pending BCx, UA positive, UCx pos GNR, on Zosyn. On and off Levo, has A-line placed. Seems more somnolent toda but still following commands.Taper steroids. C/w Vori, d/w ID re: when to d/c. Will wean off sedation and give pain control PRN with fentanyl IVPs etc. WIll reassess BP today and decide whether to d/c A-line  11/27 - aerobic after 36 hours "GPC clusters  11/28 CT chest showed increase in DAVID pneumothorax. Could possible be bleb. Chest tube placed. Pt high O2 on arterial blood gas, FiO2 decreased to 40. Pt grew Klebsiella on urine cx. Zosyn descalated to ceftriaxone. Pt grew staph epidermis on repeat blood cx and 2 bottles, vanc continued.   11/29: Vanc level trough 1400. WIll speak to surgeon re: CT findings -?bronchopleural fistula.Stop sedation. F/u Cx.  11/30: blood noted in ETT and vent tubiing- bronched and no brisk source identified. Possibly coming from area just superior to trach site. CT neck/chest ordered. Placed back on sedation and AC.  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 Patient is hypotensive on Levophed. Spiked ON fever to 102-7. Ceftriaxone switched to meropenem. Cultures - no growth. No subsequent trach bleeding. Withdraw tube slightly - appears to be alightly deep on CT. K+ repletion done for hypokalemia possible contributing to ON bradycardia and hypotension  12/2: persistent tracheal bleeding. 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 Consult GI for PEG tube; now off propofol sedation, can use CPAP if necessary, no drips for sedation ; 10 of prednisone tomorrow  ON: Consented over phone for 1u pRBC for Hb 6.4.  12/3: Unclamped chest tube with continued air, c/w chest tube for now. d/c antibiotics tomorrow (12/4)  ON: chest tube fell out . Pt became uncomfortable. Peak pressures in the high 30s. Pt given versed. CXR ordered. fentanyl 100 mg X 1 Given another fentanyl 100 and Versed 4 around 6am for pain.  12/4: L femoral vein not compressible on POCUS, concern for DVT, checking formal DVT study. If pos will start hep gtt (low PTT goal, no bolus). Would have close monitoring given high risk of bleed, if bleeds would need to consider IVC filter.   ON: High Ppeak to 50s, Discordent on vent. Placed on propofol gtt.  CT chest does not reveal PTX or other significant change per prelim read. Bronch, mucus plugs removed. Pt became hypotensive with prop, started on levophed. Maybe get ABG?   12/5: ABG with mild alkalosis. RR decreased to 8. Repeat ABG  ON: ph: 7.42 pCO2: 77 pO2: 152 HCO2: 41. FS in 70s and patient NPO - giving 1/2 Amp D50. Peak pressures in the high 40s.   12/6: Off levophed. Persistently high peak pressures - consider increase in trach tube size tomorrow morning. s/p peg - hold meds through peg for 24 hrs and feeds for 48 hours. Patient's L arm edematous, LUE US today. Still volume overloaded, will diurese today. Will try to wean from propofol for pressure support trial tomorrow. Patient is on 100mcg fentanyl patch - will try to wean from that as well.  12/6 ON: Diuresing well so held PM dose Lasix. Depaokete changed to IV .Changed valium back to PO once can use PEG tube, given valium IV is shortage. Possible increase size of trach in the am (12/7/17). Was a bit anxious but improved after suctioning. Did not require benzos.   12/7: negative upper extremity DVT study; now on fentanyl patch and valium not feeling anxious; reevaluate previous  CT scan for size of trach and will upsize it before sending him out ; switch to budesonide inhaler ; now switched to heparin subQ   - afternoon/evening: goal for today after AM lasix dose was 1L, pt with > 1L as per discussion with nurse. No further lasix needed overnight. 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     This is a patient with a complicated medical history and prolonged course of stay.  This is a 47 M with a known PFO, CVA w/ left-sided hemiparesis, sarcoidosis on steroid c/b aspergilloma treated w/ Voriconazole.  The pt initially presented to [facility] with hemoptysis, for which the patient had a DAVID resection     PTA:  presumed stroke - started on apixaban  taken urgently to OR for DAVID pneumonectomy  persistent air leak with air trapping  then hemorrhagic conversion  went back to OR for that  then pleurodesis  pigtail in place  then removed end of october  went to rehab  11/9: Admitted to [facility]; Has few episodes of blood tinged sputum early, but in PM had 2 episodes of true hemoptysis; Scheduled for IR embolization/Consultation in am tomorrow  11/10: CTA obtained which revealed bleeding from staple line; Repeat episodes of hemoptysis during CTA; Cardiology consulted for closure of PFO; Need to f/u Neurology consultation re: stopping a/c for previous stroke (not on A/C right now)  11/8-11/20:  returned to hospital with recurrent hemoptysis  intubated, brought to CTICU  multiple bronchs with ablation by IR  no more bleeding  plan to be taken to [facility] for lung transplant  vent weaning  11/22: Required reintubation in AM  11/23: Plan for trach tomorrow. Full labs ordered. Monitor metabolic alkalosis  O/N: tracheal aspirate noted to have GPC clusters on GRAM STAIN with rare bacteria  11/24: F/U sputum culture. Remove a-line. D/c Pickens. Decrease Prednisone to 30. Trach performed at bedside today.  O/N: given valproic acid IV- d/n attemp OG as trach site with bleeding; slightly hypotensive overnight in 100s/90s MAPs in mid 60s to mid 70s, given 500cc bolus x1, attempt to transition to fentanyl patch 100mcg from drip; hypoglycemic given D50 x1 and started on D5 drip at 30, became hypoglycemic again, another D50x1, drip increased to 50. Mild bleeding at trach site. Now bleeding from nose (mucus mixed with blood)  11/25: Episodes of hypotension with unclear etiology. Net pos 500cc. Currently on propofol, Repeat blood cultures. Monitor bleeding. F/u respiratory cultures. Hold tube feeds. Possible OG tube vs PEG. Call GI monday. D5NS at 100 to correct hypoglycemia. Intermittently needs pressors. Chest PT. Febrile to 103 aroudn noon, already got 650 tylenol supp, gave 1g Ofirmev, cooling blanket. started on abx, ua/ucx, blood cx sent.   11/26: Back on feeds and sugars are better. Febrile yesterday TMax 103, pending BCx, UA positive, UCx pos GNR, on Zosyn. On and off Levo, has A-line placed. Seems more somnolent toda but still following commands.Taper steroids. C/w Vori, d/w ID re: when to d/c. Will wean off sedation and give pain control PRN with fentanyl IVPs etc. WIll reassess BP today and decide whether to d/c A-line  11/27 - aerobic after 36 hours "GPC clusters  11/28 CT chest showed increase in DAVID pneumothorax. Could possible be bleb. Chest tube placed. Pt high O2 on arterial blood gas, FiO2 decreased to 40. Pt grew Klebsiella on urine cx. Zosyn descalated to ceftriaxone. Pt grew staph epidermis on repeat blood cx and 2 bottles, vanc continued.   11/29: Vanc level trough 1400. WIll speak to surgeon re: CT findings -?bronchopleural fistula.Stop sedation. F/u Cx.  11/30: blood noted in ETT and vent tubiing- bronched and no brisk source identified. Possibly coming from area just superior to trach site. CT neck/chest ordered. Placed back on sedation and AC.  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 Patient is hypotensive on Levophed. Spiked ON fever to 102-7. Ceftriaxone switched to meropenem. Cultures - no growth. No subsequent trach bleeding. Withdraw tube slightly - appears to be alightly deep on CT. K+ repletion done for hypokalemia possible contributing to ON bradycardia and hypotension  12/2: persistent tracheal bleeding. 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 Consult GI for PEG tube; now off propofol sedation, can use CPAP if necessary, no drips for sedation ; 10 of prednisone tomorrow  ON: Consented over phone for 1u pRBC for Hb 6.4.  12/3: Unclamped chest tube with continued air, c/w chest tube for now. d/c antibiotics tomorrow (12/4)  ON: chest tube fell out . Pt became uncomfortable. Peak pressures in the high 30s. Pt given versed. CXR ordered. fentanyl 100 mg X 1 Given another fentanyl 100 and Versed 4 around 6am for pain.  12/4: L femoral vein not compressible on POCUS, concern for DVT, checking formal DVT study. If pos will start hep gtt (low PTT goal, no bolus). Would have close monitoring given high risk of bleed, if bleeds would need to consider IVC filter.   ON: High Ppeak to 50s, Discordent on vent. Placed on propofol gtt.  CT chest does not reveal PTX or other significant change per prelim read. Bronch, mucus plugs removed. Pt became hypotensive with prop, started on levophed. Maybe get ABG?   12/5: ABG with mild alkalosis. RR decreased to 8. Repeat ABG  ON: ph: 7.42 pCO2: 77 pO2: 152 HCO2: 41. FS in 70s and patient NPO - giving 1/2 Amp D50. Peak pressures in the high 40s.   12/6: Off levophed. Persistently high peak pressures - consider increase in trach tube size tomorrow morning. s/p peg - hold meds through peg for 24 hrs and feeds for 48 hours. Patient's L arm edematous, LUE US today. Still volume overloaded, will diurese today. Will try to wean from propofol for pressure support trial tomorrow. Patient is on 100mcg fentanyl patch - will try to wean from that as well.  12/6 ON: Diuresing well so held PM dose Lasix. Depaokete changed to IV .Changed valium back to PO once can use PEG tube, given valium IV is shortage. Possible increase size of trach in the am (12/7/17). Was a bit anxious but improved after suctioning. Did not require benzos.   12/7: negative upper extremity DVT study; now on fentanyl patch and valium not feeling anxious; reevaluate previous  CT scan for size of trach and will upsize it before sending him out ; switch to budesonide inhaler ; now switched to heparin subQ   - afternoon/evening: goal for today after AM lasix dose was 1L, pt with > 1L as per discussion with nurse. No further lasix needed overnight. 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     This is a patient with a complicated medical history and prolonged course of stay.  This is a 47 M with a known PFO, COPD, CVA w/ left-sided hemiparesis, sarcoidosis on steroid c/b aspergilloma treated w/ Voriconazole.  The pt initially presented to [facility] with hemoptysis, for which the patient had a DAVID resection; pt was found to have a persistent air leak with trapping followed by a hemorrhagic conversion, for which the the pt was taken back to the OR for pleurodesis pig-tail catheter placement, for which the pt was monitored, improved, and then sent to rehabilitation.  On November 11, the pt was admitted to thoracic surgery for several episodes of blood-tinged sputum followed by true hemoptysis.  Pt under went IR embolization on 11/10 for bleeding from the staple line, which was the presumed source of the hemoptysis.  The pt was then brought to the CTICU, where he underwent intubation and mechanical ventilation for airway protection and support in the setting of recurrent hemoptysis, during which the pt underwent multiple bronchoscopic examinations and an IR-guided ablation, after which the pt did not have hemoptysis.  The goal was to transfer the pt to [facility] for lung transplant evaluation.  By 11/21, the pt had been transferred to the MICU for optimization, since he was unabled to be successfully weaned off the vent in the CTICU.  On 11/23, the plan was for a tracheostomy, but tracheal aspiration showed gram-positive cocci in clusters; the A-line was removed on the 24th, and his home dose prednisone was decreased to 30 mg.  On 11/24, a bedside tracheostomy was done; pt had several bouts of hypotension between 11/24-25 and pt had numerous episodes of hypoglycemia during this time, which required correction with D5; pt was also febrile and found to be UA positive.  On 11/26, UCx showed GNR, for which pt was started on Zosyn and Levophed was discontinued; another A-line was placed; found to have Klebsiella in the urine culture and was de-escalated to Ceftriaxone; by 11/28, pt had positive blood cultures with Staph epidermidis, for which Vancomycin was continued.  On 11/29, there was discussion of a bronchopleural fistula, for which thoracic surgery was involved; no intervention was made at that time.  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  On 12/2, GI was consulted for PEG tube placement.  On 12/3, chest tube fell out and was not placed back.  On 12/4, POCUS of LLE concerning for non-compressible L femoral vein; formal B/L LE DVT study was negative.  By 12/6, pt was off Levophed and was having persistently high peak pressures possibly secondary to having a trach tube that may be too small.  At this time, pt was weaned off propofol and was switched to Fentanyl patch.  Pt remained on depakote for seizure prophylaxis s/p CVA, pt also had PEG tube placed on 12/6.  On 12/7, upper extremities were found to be swollen, for which a b/l UE DVT study was ordered, the results of which are pending.    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     This is a patient with a complicated medical history and prolonged course of stay.  This is a 47 M with a known PFO, COPD, CVA w/ left-sided hemiparesis, sarcoidosis on steroid c/b aspergilloma treated w/ Voriconazole.  The pt initially presented to [facility] with hemoptysis, for which the patient had a DAVID resection; pt was found to have a persistent air leak with trapping followed by a hemorrhagic conversion, for which the the pt was taken back to the OR for pleurodesis pig-tail catheter placement, for which the pt was monitored, improved, and then sent to rehabilitation.  On November 11, the pt was admitted to thoracic surgery for several episodes of blood-tinged sputum followed by true hemoptysis.  Pt under went IR embolization on 11/10 for bleeding from the staple line, which was the presumed source of the hemoptysis.  The pt was then brought to the CTICU, where he underwent intubation and mechanical ventilation for airway protection and support in the setting of recurrent hemoptysis, during which the pt underwent multiple bronchoscopic examinations and an IR-guided ablation, after which the pt did not have hemoptysis.  The goal was to transfer the pt to [facility] for lung transplant evaluation.  By 11/21, the pt had been transferred to the MICU for optimization, since he was unabled to be successfully weaned off the vent in the CTICU.  On 11/23, the plan was for a tracheostomy, but tracheal aspiration showed gram-positive cocci in clusters; the A-line was removed on the 24th, and his home dose prednisone was decreased to 30 mg.  On 11/24, a bedside tracheostomy was done; pt had several bouts of hypotension between 11/24-25 and pt had numerous episodes of hypoglycemia during this time, which required correction with D5; pt was also febrile and found to be UA positive.  On 11/26, UCx showed GNR, for which pt was started on Zosyn and Levophed was discontinued; another A-line was placed; found to have Klebsiella in the urine culture and was de-escalated to Ceftriaxone; by 11/28, pt had positive blood cultures with Staph epidermidis, for which Vancomycin was continued.  On 11/29, there was discussion of a bronchopleural fistula, for which thoracic surgery was involved; no intervention was made at that time.  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  On 12/2, GI was consulted for PEG tube placement.  On 12/3, chest tube fell out and was not placed back.  On 12/4, POCUS of LLE concerning for non-compressible L femoral vein; formal B/L LE DVT study was negative.  By 12/6, pt was off Levophed and was having persistently high peak pressures possibly secondary to having a trach tube that may be too small.  At this time, pt was weaned off propofol and was switched to Fentanyl patch.  Pt remained on depakote for seizure prophylaxis s/p CVA, pt also had PEG tube placed on 12/6.  On 12/7, upper extremities were found to be swollen, for which a b/l UE DVT study was ordered, the results of which are pending.    To do:  1) f/u trach size and wean to trach collar   2) PT consult when appropriate  3) vent weaning as tolerated   4) CT surgery consult for PFO  5) f/u formal read of UE DVT study   6) complete 6 week voriconazole course - last day is 12/28

## 2017-12-07 NOTE — PROGRESS NOTE ADULT - ATTENDING COMMENTS
12/03: His lungs failed to re expand following pig tail to left upper airspace/ptx: He does have airleak: Likely has BPF: : He failed weaning trials too: Cont current supportive care as well as daily Weaning trials!!  12/05: cont current supportive treatment: has pretty poor lungs!!  12/06: critically ill: chest tube still out with same loculated ptx: cont supportive care !!  12/07: cont supportive treatment

## 2017-12-07 NOTE — PROGRESS NOTE ADULT - PROBLEM SELECTOR PLAN 2
intubated: try to titrate as tolerated!  11/18 on ventilator support. Pt is sedated and paralyzed. defer ventilator Management/ weaning per CTICU team.  11/19 still sedated and paralyzed. Plan to wean off Nimbex today per Primary RN. ABGs reviewed.  11/20:ff paralyzing agent! But sedated !!  11/21: awake and alert: responding to simple commands: weaning trials  11/22: currently sedated: ? trach now?  11/23: for trach in AM  11/24: trach today  11/25: S/P trach yesterday: chest x-ray noted: Cont full vent support and wean if tolerates  11/26: [id number] with trach and full mechanical ventilation: Discussed with MICU attending in detail: for rpt ct scan chest  11/28: rpt ct scan c hst reviewed: for possible chest tube on rt side  11/30: on full vent support with weaning trials  12/01:cont weaning trails as tolerated; prednisone dose lowered!!  12/2 on CPAP trial. Continue wean off as tolerate  12/3 CPAP as tolerated  12/04: cont cpap trial: chest tube is out: Likely has BPF on the left side:  12/05: new ct noted: no official report: to me there is no improvement in left upper lobe PTX  12/07: difficult to wean: He wheezes when the steroids are taped down  12/6: chest tube fell out: no improvement in ptx:   12/6:

## 2017-12-07 NOTE — PHYSICAL THERAPY INITIAL EVALUATION ADULT - ADDITIONAL COMMENTS
as Per Pt., confirmed by pt. girlfriend who is bedside, Pt lives in a pvt home with family and girlfriend. Pt. has steps to reach bedroom/bathroom. Pt. was previously ambulating without the use of any AD independently. Pt. previously independent with all ADLs. Pt. returned to bed at end of session with lines/tubes intact, call bell in reach and in NAD. CHARLIE Heart made aware.
As per patient , he was admitted in the hospital for the last 2 months and got DCd to rehab for a week and came back to the hospital. Prior to the first hospitalization patient was independent.

## 2017-12-07 NOTE — PHYSICAL THERAPY INITIAL EVALUATION ADULT - DISCHARGE DISPOSITION, PT EVAL
anticipated D/C to rehab facility to addres current functional limitation to optimize safety to allow pt. to return to their prior level of function./rehabilitation facility
rehabilitation facility

## 2017-12-07 NOTE — PHYSICAL THERAPY INITIAL EVALUATION ADULT - PATIENT PROFILE REVIEW, REHAB EVAL
yes/Pt. profile reviewed, consulted with RN Una Yadav prior to initial PT evaluation and tx, as per RN, Pt. is OK to participate in skilled therapy session.

## 2017-12-07 NOTE — PROGRESS NOTE ADULT - PROBLEM SELECTOR PLAN 4
11/17: try to decrease steroids in AM  11/18 Duoneb. intubated for acute respiratory distress with hypoxia  11/19 KIARA. intubated and on ventilator support  11/20: cont vent support: start weaning as tolerated!  11/21: would decrease  his steroids to 30 mg a day from tomorrow  11/22: DECREASE STEROIDS: HE WHEEZES WHEN THE STEROIDS ARE TAPERED: BUT NEED TO BE LOWERED NOW AND WATCH!!  11/23: on 40 mg of prednisone: try to decrease and taper  11/24: prednisone decreased to 30 ,g a day !!  11/25: on 30 mg a day now:  11/27: on steroids: for repeat ct scan chest today  11/28: ct reviewed with MICU attending  11/30: on bronchodilators and steroids: weaning trials  12/01:cont weaning trials as tolerated : new ct noted:not much reexpansion of ptx!!  12/2 on bronchodilators and steroid. Wean as per MICU  12/3 continue current treatment  12/04: cont BD and steroids  12/05: on low dose steroids now: at 10 mg per day !!  12/05:on low dose steroids  12/07:: cont steroids

## 2017-12-07 NOTE — PHYSICAL THERAPY INITIAL EVALUATION ADULT - GENERAL OBSERVATIONS, REHAB EVAL
pt. received in bed with girlfriend bedside.
Patient received seated at the EOB, (+) 3LO2 via NC ,patient in NAD.

## 2017-12-07 NOTE — PROGRESS NOTE ADULT - ATTENDING COMMENTS
Chronic respiratory failure secondary to sarcoidosis s/p lobar resection in the past for hemoptysis and now s/p trach.  Weaning of sedation with plan for daily PST.  Plan to upsize trach to cassidy 8.  Continue voriconazole.  Continue to monitor for increase in intrathoracic pressures, stable loculated PTX post lobectomy.  Status unchanges since yesterday.

## 2017-12-07 NOTE — PHYSICAL THERAPY INITIAL EVALUATION ADULT - PATIENT/FAMILY/SIGNIFICANT OTHER GOALS STATEMENT, PT EVAL
pt and pt girlfriend report goal of getting stronger.
Patient wishes to regain his functional abilities

## 2017-12-07 NOTE — PROGRESS NOTE ADULT - PROBLEM SELECTOR PLAN 3
11/10 quantify hemoptysis. no AC. stable  11/11 no more hemoptysis. off AC  11/12 resolved. off AC  11/13: today had hemoptysis again----->localize site---> IR embolization: left a message for thoracic team  11/14: DW Dr Caldera: for repeat bronchoscopy to localize the site of bleeding and need IR help to embolize the culprit vessel!  11/15: started on broad spectrum antibiotics and for bronchoscopy today  11/16: s/p IR emobolization done: monitor for more hemoptysis  11/17: s/p bronchsocopy: reportedly old blood seen in airways!  11/18 s/p bronchoscopy in ICU via ETT. old clot. no active bleeding  11/19 stable. no active bleeding per recent bronchoscopy  11/20; off AC at this time  11/22: resolved: remained off ac  11/23: remained off AC: has hx of PAF  11/24: remains off AC  11/27: no bleeding noted  11/30: no recent  hemoptysis  12/01:so far no more hemoptysis  12/07: none

## 2017-12-07 NOTE — PROGRESS NOTE ADULT - PROBLEM SELECTOR PLAN 7
s/p pleurodesis.  11/10 s/p pleurodesis. Management defer to CTS  11/11 no intervention for now. per CTS.  11/13: has mod pneumothorax stable: defer to thoracic for any intervention!  11/12 no intervention planned. bronchoscopy in future per CTS  11/14: Pt still has left sided pneumothorax: s/p blood patch as well as the talc pleurodesis on the left side: Hard to do any other intervention : would defer to thoracic surgical team!!  11/18 management per CTS. not plan for Chest tube at this moment.  11/19 stable radiographically and clinically. Management defer to CTS  11/22: Has this air space in the left lower lobe following DAVID lobectomy: ? PTX, empty space ? any intervention would be difficult: would defer to ct surgery!!  11/27/2017: for rpt ct scan chest now  1130: s/p chest tube placement on left side: yesterdays x-ray no full lung reexpansion:  12/01/2017: with only slight reexpansion of the ptx on left side after a small chest tube!!  12/2 radiographically improved. Chest Tube (+)  12/3 stable. Chest tube (+)  12/04: has persistent ptx on the left side: has BPF too  12/6: no improvement in ptx following chest tube: remains same even after falling of chest tube!  12/07: no change despite intervention with chest tube

## 2017-12-07 NOTE — PROGRESS NOTE ADULT - ASSESSMENT
47M with hx of sarcoidosis, cerebrovascular infarct with PFO on echo and paroxysmal afib in September started on eliquis c/b recurrent hemoptysis (Eliquis d/c'd on 9/20, then restarted between 10/23-27 after hemoptysis improved), d/c'd to rehab then returned 11/9 for recurrent hemoptysis requiring intubation and CTICU admission for management of hemoptysis including IR embolization (11/16) and serial bronch (11/17 and 11/20) in CTICU with no active bleeding visualized, now remains intubated with difficulty weaning vent as during CPAP trials pt fails to initiate breaths, unclear etiology. Transferred to MICU from CTICU for continued vent management and attempted weaning. Bedside trach placed 11/24 c/b episode of hypoxia and bleeding around trach site after suctioning, resolved after surgicel and pressure. Has been febrile intermittently, though improving, grew Klebsiella on urine cx, blood cx with staph epi. Now with recurrent bloody secretions from trach s/p rpt bronch 11/30, spiked another temp 12/1/17 (102.7) and repeat cultures drawn. Also with DAIVD collapse with concern for pneumothorax vs broncho-pleural fistula.     # Neuro:  - pt with increased agitation and discordant breathing on vent w/ high peak pressures  - now sedated with propofol and valium and without evid of distress on vent    #Cardiovascular:  - No longer on levophed  - DVT study was (-). No indication for AC at this time  - LUE edematous - will order LUE DVT study    #Respiratory:  - Patient has intermittently high peak pressures possibly secondary to secretions, tracheostomy tube compression, vent discordance  -Trach placed 11/24  -L upper anterior pigtail fell out 12/5  - L loculated pneumothorax unchanged on CT  -Continue bronchodilators and pulmonary toilet  -c/w prednisone 10mg  -Bloody secretions from trach initially, now improving  -CT neck from 12/1 with ?vessel irregularity on left adjacent to trach; small branch off thyroid artery (?pseudoaneruysm)     #GI:  - s/p PEG placement - no food for 24hrs, no meds for 48 hours  - NPO for now    #Renal:  - chronically elevated bicarb likely 2/2 chronic hypercarbia in setting of hypoventilation  - Monitor I's and O's  - Lasix for active diuresis    #Heme:  - hgb remains stable   - no DVT on LE US 12/5    #ID:  -Aspergilloma dx 9/24/17, on voriconazole x 6 weeks per ID recommendations.   -Recommend continuing voriconazole given hemoptysis recurred after was stopped, possible related to inflammation 2/2 aspergilloma and possible continued infection  - confirmed voriconazole dosing with ID  -last (+) blood cx w/ GPC on 11/26 now (-)   -Afebrile and improved, recent cultures all negative  -WBC stable now at 13  - can hold off on additional abx    #Endocrine:   - ISS  - Continue to monitor glucose levels    #DVT PPX: lovenox 47M h/o PFO, CVA, sarcoidosis c/b aspergilloma (on voriconazole) w/ hemoptysis s/p DVAID resection 9/2017, 11/8 to CTICU for recurrent hemoptysis  and acute hypoxic respiratory failure, S/P IR embolization of Left bronchial artery on 11/16, transferred to MICU for attempts at vent weaning, s/p trach, stable.     # Neuro:  - pt with increased agitation overnight, pt responded well to suction, remains stable.   - off IV sedation, cont w/ valium PO  #Cardiovascular:  - No longer on levophed  - DVT study was (-). No indication for AC at this time  - LUE edematous yest, formal LUE US w/o evid of DVT  - will consider CT surg eval for closure of PFO    #Respiratory:  - Patient has intermittently high peak pressures possibly secondary to secretions, tracheostomy tube compression, vent discordance  -Trach placed 11/24  -L upper anterior pigtail fell out 12/5  - L loculated pneumothorax unchanged on CT  -Continue bronchodilators and pulmonary toilet  -c/w prednisone 10mg  -Bloody secretions from trach initially, now improving  -CT neck from 12/1 with ?vessel irregularity on left adjacent to trach; small branch off thyroid artery (?pseudoaneruysm)     #GI:  - s/p PEG placement - no food for 24hrs, no meds for 48 hours  - NPO for now    #Renal:  - chronically elevated bicarb likely 2/2 chronic hypercarbia in setting of hypoventilation  - Monitor I's and O's  - Lasix for active diuresis    #Heme:  - hgb remains stable   - no DVT on LE US 12/5    #ID:  -Aspergilloma dx 9/24/17, on voriconazole x 6 weeks per ID recommendations.   -Recommend continuing voriconazole given hemoptysis recurred after was stopped, possible related to inflammation 2/2 aspergilloma and possible continued infection  - confirmed voriconazole dosing with ID  -last (+) blood cx w/ GPC on 11/26 now (-)   -Afebrile and improved, recent cultures all negative  -WBC stable now at 13  - can hold off on additional abx    #Endocrine:   - ISS  - Continue to monitor glucose levels    #DVT PPX: lovenox 47M h/o PFO, CVA, sarcoidosis c/b aspergilloma (on voriconazole) w/ hemoptysis s/p DAVID resection 9/2017, 11/8 to CTICU for recurrent hemoptysis  and acute hypoxic respiratory failure, S/P IR embolization of Left bronchial artery on 11/16, transferred to MICU for attempts at vent weaning, s/p trach, stable.     # Neuro:  - pt with increased agitation overnight, pt responded well to suction, remains stable.   - off IV sedation, cont w/ valium PO  #Cardiovascular:  - No longer on levophed  - DVT study was (-). No indication for AC at this time  - LUE edematous yest, formal LUE US w/o evid of DVT  - will consider CT surg eval for closure of PFO    #Respiratory:  - Patient has intermittently high peak pressures possibly secondary to secretions, tracheostomy tube compression, vent discordance  -Trach placed 11/24  -L upper anterior pigtail fell out 12/3, remains with indication to re-place.   - L loculated pneumothorax unchanged on CT  -Continue bronchodilators and pulmonary toilet  -c/w prednisone 10mg  -Bloody secretions from trach initially, now improving  -CT neck from 12/1 with ?vessel irregularity on left adjacent to trach; small branch off thyroid artery (?pseudoaneruysm)     #GI:  - s/p PEG placement - no food for 24hrs, no meds for 48 hours  - NPO for now    #Renal:  - chronically elevated bicarb likely 2/2 chronic hypercarbia in setting of hypoventilation  - Monitor I's and O's  - Lasix for active diuresis    #Heme:  - hgb remains stable   - no DVT on LE US 12/5    #ID:  -Aspergilloma dx 9/24/17, on voriconazole x 6 weeks per ID recommendations.   -Recommend continuing voriconazole given hemoptysis recurred after was stopped, possible related to inflammation 2/2 aspergilloma and possible continued infection  - confirmed voriconazole dosing with ID  -last (+) blood cx w/ GPC on 11/26 now (-)   -Afebrile and improved, recent cultures all negative  -WBC stable now at 13  - can hold off on additional abx    #Endocrine:   - ISS  - Continue to monitor glucose levels    #DVT PPX: lovenox 47M h/o PFO, CVA, sarcoidosis c/b aspergilloma (on voriconazole) w/ hemoptysis s/p DAVID resection 9/2017, 11/8 to CTICU for recurrent hemoptysis  and acute hypoxic respiratory failure, S/P IR embolization of Left bronchial artery on 11/16, transferred to MICU for attempts at vent weaning, s/p trach, stable.     # Neuro:  - pt with increased agitation overnight, pt responded well to suction, remains stable.   - off IV sedation, cont w/ valium PO    #Cardiovascular:  - No longer on levophed  - DVT study was (-). No indication for AC at this time  - LUE edematous yest, formal LUE US w/o evid of DVT  - will consider CT surg eval for closure of PFO    #Respiratory:  - Patient has intermittently high peak pressures possibly secondary to secretions, tracheostomy tube compression, vent discordance  -Trach placed 11/24  -L upper anterior pigtail fell out 12/3, remains with indication to re-place.   - L loculated pneumothorax unchanged on CT  -Continue bronchodilators and pulmonary toilet  -c/w prednisone 10mg  -Bloody secretions from trach initially, now improving  -CT neck from 12/1 with ?vessel irregularity on left adjacent to trach; small branch off thyroid artery (?pseudoaneruysm)   -anticipating a slow recovery, considering replacement of tube with a larger diameter one given pt's larger body habitus and significant structural lung disease.     #GI:  - s/p PEG placement - no food for 24hrs, no meds for 48 hours  - NPO for now    #Renal:  - chronically elevated bicarb likely 2/2 chronic hypercarbia in setting of hypoventilation  - Monitor I's and O's  - Lasix for active diuresis    #Heme:  - hgb remains stable   - no DVT on LE US 12/5    #ID:  -Aspergilloma dx 9/24/17, on voriconazole x 6 weeks per ID recommendations.   -Recommend continuing voriconazole given hemoptysis recurred after was stopped, possible related to inflammation 2/2 aspergilloma and possible continued infection  - confirmed voriconazole dosing with ID  -last (+) blood cx w/ GPC on 11/26 now (-)   -Afebrile and improved, recent cultures all negative  -WBC stable now at 13  - can hold off on additional abx    #Endocrine:   - ISS  - Continue to monitor glucose levels    #DVT PPX: lovenox

## 2017-12-07 NOTE — PROGRESS NOTE ADULT - PROBLEM SELECTOR PLAN 5
11/13: cont oral steroids!!  11/14: Has pretty poor lungs secondary to steroids: Cont current therapy!!  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  11/27: cont on steroids  11/30: om 30 mg of prednisone  12/01:on 20 mg of prednisone now !!  12/02 Prednisone  12/3 on Prednisone  12/04:low taper of steroids  12/05: 10 mg of prednisone  12/06: on low dose steroids  12/07: very poor current lung status: cont supportive care!!

## 2017-12-08 DIAGNOSIS — Z29.9 ENCOUNTER FOR PROPHYLACTIC MEASURES, UNSPECIFIED: ICD-10-CM

## 2017-12-08 DIAGNOSIS — Z51.5 ENCOUNTER FOR PALLIATIVE CARE: ICD-10-CM

## 2017-12-08 DIAGNOSIS — R53.2 FUNCTIONAL QUADRIPLEGIA: ICD-10-CM

## 2017-12-08 LAB
ALBUMIN SERPL ELPH-MCNC: 2.4 G/DL — LOW (ref 3.3–5)
ALBUMIN SERPL ELPH-MCNC: 2.6 G/DL — LOW (ref 3.3–5)
ALP SERPL-CCNC: 81 U/L — SIGNIFICANT CHANGE UP (ref 40–120)
ALP SERPL-CCNC: 84 U/L — SIGNIFICANT CHANGE UP (ref 40–120)
ALT FLD-CCNC: 12 U/L — SIGNIFICANT CHANGE UP (ref 4–41)
ALT FLD-CCNC: 7 U/L — SIGNIFICANT CHANGE UP (ref 4–41)
AST SERPL-CCNC: 19 U/L — SIGNIFICANT CHANGE UP (ref 4–40)
AST SERPL-CCNC: 20 U/L — SIGNIFICANT CHANGE UP (ref 4–40)
BASE EXCESS BLDA CALC-SCNC: 19 MMOL/L — SIGNIFICANT CHANGE UP
BASE EXCESS BLDA CALC-SCNC: 20.8 MMOL/L — SIGNIFICANT CHANGE UP
BASE EXCESS BLDA CALC-SCNC: 21 MMOL/L — SIGNIFICANT CHANGE UP
BASE EXCESS BLDA CALC-SCNC: 21 MMOL/L — SIGNIFICANT CHANGE UP
BASE EXCESS BLDA CALC-SCNC: 21.4 MMOL/L — SIGNIFICANT CHANGE UP
BASOPHILS # BLD AUTO: 0.01 K/UL — SIGNIFICANT CHANGE UP (ref 0–0.2)
BASOPHILS # BLD AUTO: 0.01 K/UL — SIGNIFICANT CHANGE UP (ref 0–0.2)
BASOPHILS NFR BLD AUTO: 0.1 % — SIGNIFICANT CHANGE UP (ref 0–2)
BASOPHILS NFR BLD AUTO: 0.1 % — SIGNIFICANT CHANGE UP (ref 0–2)
BILIRUB SERPL-MCNC: 0.2 MG/DL — SIGNIFICANT CHANGE UP (ref 0.2–1.2)
BILIRUB SERPL-MCNC: 0.2 MG/DL — SIGNIFICANT CHANGE UP (ref 0.2–1.2)
BLD GP AB SCN SERPL QL: NEGATIVE — SIGNIFICANT CHANGE UP
BUN SERPL-MCNC: 10 MG/DL — SIGNIFICANT CHANGE UP (ref 7–23)
BUN SERPL-MCNC: 11 MG/DL — SIGNIFICANT CHANGE UP (ref 7–23)
CALCIUM SERPL-MCNC: 8.7 MG/DL — SIGNIFICANT CHANGE UP (ref 8.4–10.5)
CALCIUM SERPL-MCNC: 8.8 MG/DL — SIGNIFICANT CHANGE UP (ref 8.4–10.5)
CHLORIDE BLDA-SCNC: 88 MMOL/L — LOW (ref 96–108)
CHLORIDE BLDA-SCNC: 89 MMOL/L — LOW (ref 96–108)
CHLORIDE SERPL-SCNC: 91 MMOL/L — LOW (ref 98–107)
CHLORIDE SERPL-SCNC: 91 MMOL/L — LOW (ref 98–107)
CO2 SERPL-SCNC: 44 MMOL/L — HIGH (ref 22–31)
CO2 SERPL-SCNC: 47 MMOL/L — CRITICAL HIGH (ref 22–31)
CREAT SERPL-MCNC: 0.66 MG/DL — SIGNIFICANT CHANGE UP (ref 0.5–1.3)
CREAT SERPL-MCNC: 0.69 MG/DL — SIGNIFICANT CHANGE UP (ref 0.5–1.3)
EOSINOPHIL # BLD AUTO: 0.09 K/UL — SIGNIFICANT CHANGE UP (ref 0–0.5)
EOSINOPHIL # BLD AUTO: 0.11 K/UL — SIGNIFICANT CHANGE UP (ref 0–0.5)
EOSINOPHIL NFR BLD AUTO: 0.9 % — SIGNIFICANT CHANGE UP (ref 0–6)
EOSINOPHIL NFR BLD AUTO: 1.1 % — SIGNIFICANT CHANGE UP (ref 0–6)
GLUCOSE BLDA-MCNC: 135 MG/DL — HIGH (ref 70–99)
GLUCOSE BLDA-MCNC: 96 MG/DL — SIGNIFICANT CHANGE UP (ref 70–99)
GLUCOSE BLDC GLUCOMTR-MCNC: 113 MG/DL — HIGH (ref 70–99)
GLUCOSE BLDC GLUCOMTR-MCNC: 79 MG/DL — SIGNIFICANT CHANGE UP (ref 70–99)
GLUCOSE BLDC GLUCOMTR-MCNC: 99 MG/DL — SIGNIFICANT CHANGE UP (ref 70–99)
GLUCOSE SERPL-MCNC: 100 MG/DL — HIGH (ref 70–99)
GLUCOSE SERPL-MCNC: 136 MG/DL — HIGH (ref 70–99)
HCO3 BLDA-SCNC: 42 MMOL/L — HIGH (ref 22–26)
HCO3 BLDA-SCNC: 44 MMOL/L — HIGH (ref 22–26)
HCO3 BLDA-SCNC: 45 MMOL/L — CRITICAL HIGH (ref 22–26)
HCT VFR BLD CALC: 27.2 % — LOW (ref 39–50)
HCT VFR BLD CALC: 27.9 % — LOW (ref 39–50)
HCT VFR BLDA CALC: 24.2 % — LOW (ref 39–51)
HCT VFR BLDA CALC: 25.8 % — LOW (ref 39–51)
HGB BLD-MCNC: 8.1 G/DL — LOW (ref 13–17)
HGB BLD-MCNC: 8.4 G/DL — LOW (ref 13–17)
HGB BLDA-MCNC: 7.8 G/DL — LOW (ref 13–17)
HGB BLDA-MCNC: 8.3 G/DL — LOW (ref 13–17)
IMM GRANULOCYTES # BLD AUTO: 0.07 # — SIGNIFICANT CHANGE UP
IMM GRANULOCYTES # BLD AUTO: 0.09 # — SIGNIFICANT CHANGE UP
IMM GRANULOCYTES NFR BLD AUTO: 0.8 % — SIGNIFICANT CHANGE UP (ref 0–1.5)
IMM GRANULOCYTES NFR BLD AUTO: 0.9 % — SIGNIFICANT CHANGE UP (ref 0–1.5)
INR BLD: 1.28 — HIGH (ref 0.88–1.17)
LACTATE BLDA-SCNC: 0.6 MMOL/L — SIGNIFICANT CHANGE UP (ref 0.5–2)
LACTATE BLDA-SCNC: 1.6 MMOL/L — SIGNIFICANT CHANGE UP (ref 0.5–2)
LYMPHOCYTES # BLD AUTO: 0.95 K/UL — LOW (ref 1–3.3)
LYMPHOCYTES # BLD AUTO: 1.65 K/UL — SIGNIFICANT CHANGE UP (ref 1–3.3)
LYMPHOCYTES # BLD AUTO: 12.1 % — LOW (ref 13–44)
LYMPHOCYTES # BLD AUTO: 14.1 % — SIGNIFICANT CHANGE UP (ref 13–44)
MAGNESIUM SERPL-MCNC: 1.8 MG/DL — SIGNIFICANT CHANGE UP (ref 1.6–2.6)
MAGNESIUM SERPL-MCNC: 1.9 MG/DL — SIGNIFICANT CHANGE UP (ref 1.6–2.6)
MCHC RBC-ENTMCNC: 29.3 PG — SIGNIFICANT CHANGE UP (ref 27–34)
MCHC RBC-ENTMCNC: 29.6 PG — SIGNIFICANT CHANGE UP (ref 27–34)
MCHC RBC-ENTMCNC: 29.8 % — LOW (ref 32–36)
MCHC RBC-ENTMCNC: 30.1 % — LOW (ref 32–36)
MCV RBC AUTO: 98.2 FL — SIGNIFICANT CHANGE UP (ref 80–100)
MCV RBC AUTO: 98.6 FL — SIGNIFICANT CHANGE UP (ref 80–100)
MONOCYTES # BLD AUTO: 1.08 K/UL — HIGH (ref 0–0.9)
MONOCYTES # BLD AUTO: 1.36 K/UL — HIGH (ref 0–0.9)
MONOCYTES NFR BLD AUTO: 11.6 % — SIGNIFICANT CHANGE UP (ref 2–14)
MONOCYTES NFR BLD AUTO: 13.8 % — SIGNIFICANT CHANGE UP (ref 2–14)
NEUTROPHILS # BLD AUTO: 5.65 K/UL — SIGNIFICANT CHANGE UP (ref 1.8–7.4)
NEUTROPHILS # BLD AUTO: 8.51 K/UL — HIGH (ref 1.8–7.4)
NEUTROPHILS NFR BLD AUTO: 72 % — SIGNIFICANT CHANGE UP (ref 43–77)
NEUTROPHILS NFR BLD AUTO: 72.5 % — SIGNIFICANT CHANGE UP (ref 43–77)
NRBC # FLD: 0 — SIGNIFICANT CHANGE UP
NRBC # FLD: 0 — SIGNIFICANT CHANGE UP
PCO2 BLDA: 50 MMHG — HIGH (ref 35–48)
PCO2 BLDA: 60 MMHG — HIGH (ref 35–48)
PCO2 BLDA: 64 MMHG — HIGH (ref 35–48)
PCO2 BLDA: 65 MMHG — HIGH (ref 35–48)
PCO2 BLDA: 98 MMHG — CRITICAL HIGH (ref 35–48)
PH BLDA: 7.3 PH — LOW (ref 7.35–7.45)
PH BLDA: 7.47 PH — HIGH (ref 7.35–7.45)
PH BLDA: 7.47 PH — HIGH (ref 7.35–7.45)
PH BLDA: 7.5 PH — HIGH (ref 7.35–7.45)
PH BLDA: 7.57 PH — HIGH (ref 7.35–7.45)
PHOSPHATE SERPL-MCNC: 4.3 MG/DL — SIGNIFICANT CHANGE UP (ref 2.5–4.5)
PHOSPHATE SERPL-MCNC: 4.4 MG/DL — SIGNIFICANT CHANGE UP (ref 2.5–4.5)
PLATELET # BLD AUTO: 235 K/UL — SIGNIFICANT CHANGE UP (ref 150–400)
PLATELET # BLD AUTO: 271 K/UL — SIGNIFICANT CHANGE UP (ref 150–400)
PMV BLD: 12.3 FL — SIGNIFICANT CHANGE UP (ref 7–13)
PMV BLD: 12.8 FL — SIGNIFICANT CHANGE UP (ref 7–13)
PO2 BLDA: 107 MMHG — SIGNIFICANT CHANGE UP (ref 83–108)
PO2 BLDA: 137 MMHG — HIGH (ref 83–108)
PO2 BLDA: 150 MMHG — HIGH (ref 83–108)
PO2 BLDA: 169 MMHG — HIGH (ref 83–108)
PO2 BLDA: 38 MMHG — CRITICAL LOW (ref 83–108)
POTASSIUM BLDA-SCNC: 3.4 MMOL/L — SIGNIFICANT CHANGE UP (ref 3.4–4.5)
POTASSIUM BLDA-SCNC: 4 MMOL/L — SIGNIFICANT CHANGE UP (ref 3.4–4.5)
POTASSIUM SERPL-MCNC: 3.8 MMOL/L — SIGNIFICANT CHANGE UP (ref 3.5–5.3)
POTASSIUM SERPL-MCNC: 4 MMOL/L — SIGNIFICANT CHANGE UP (ref 3.5–5.3)
POTASSIUM SERPL-SCNC: 3.8 MMOL/L — SIGNIFICANT CHANGE UP (ref 3.5–5.3)
POTASSIUM SERPL-SCNC: 4 MMOL/L — SIGNIFICANT CHANGE UP (ref 3.5–5.3)
PROT SERPL-MCNC: 5.7 G/DL — LOW (ref 6–8.3)
PROT SERPL-MCNC: 6.1 G/DL — SIGNIFICANT CHANGE UP (ref 6–8.3)
PROTHROM AB SERPL-ACNC: 14.8 SEC — HIGH (ref 9.8–13.1)
RBC # BLD: 2.76 M/UL — LOW (ref 4.2–5.8)
RBC # BLD: 2.84 M/UL — LOW (ref 4.2–5.8)
RBC # FLD: 15.1 % — HIGH (ref 10.3–14.5)
RBC # FLD: 15.2 % — HIGH (ref 10.3–14.5)
RH IG SCN BLD-IMP: POSITIVE — SIGNIFICANT CHANGE UP
SAO2 % BLDA: 73.9 % — LOW (ref 95–99)
SAO2 % BLDA: 98 % — SIGNIFICANT CHANGE UP (ref 95–99)
SAO2 % BLDA: 99 % — SIGNIFICANT CHANGE UP (ref 95–99)
SAO2 % BLDA: 99.6 % — HIGH (ref 95–99)
SAO2 % BLDA: 99.7 % — HIGH (ref 95–99)
SODIUM BLDA-SCNC: 137 MMOL/L — SIGNIFICANT CHANGE UP (ref 136–146)
SODIUM BLDA-SCNC: 139 MMOL/L — SIGNIFICANT CHANGE UP (ref 136–146)
SODIUM SERPL-SCNC: 142 MMOL/L — SIGNIFICANT CHANGE UP (ref 135–145)
SODIUM SERPL-SCNC: 142 MMOL/L — SIGNIFICANT CHANGE UP (ref 135–145)
WBC # BLD: 11.73 K/UL — HIGH (ref 3.8–10.5)
WBC # BLD: 7.85 K/UL — SIGNIFICANT CHANGE UP (ref 3.8–10.5)
WBC # FLD AUTO: 11.73 K/UL — HIGH (ref 3.8–10.5)
WBC # FLD AUTO: 7.85 K/UL — SIGNIFICANT CHANGE UP (ref 3.8–10.5)

## 2017-12-08 PROCEDURE — 71010: CPT | Mod: 26

## 2017-12-08 PROCEDURE — 99223 1ST HOSP IP/OBS HIGH 75: CPT

## 2017-12-08 PROCEDURE — 74000: CPT | Mod: 26

## 2017-12-08 RX ORDER — ALBUTEROL 90 UG/1
2 AEROSOL, METERED ORAL EVERY 4 HOURS
Qty: 0 | Refills: 0 | Status: DISCONTINUED | OUTPATIENT
Start: 2017-12-08 | End: 2017-12-08

## 2017-12-08 RX ORDER — FUROSEMIDE 40 MG
40 TABLET ORAL ONCE
Qty: 0 | Refills: 0 | Status: COMPLETED | OUTPATIENT
Start: 2017-12-08 | End: 2017-12-08

## 2017-12-08 RX ORDER — MORPHINE SULFATE 50 MG/1
2 CAPSULE, EXTENDED RELEASE ORAL ONCE
Qty: 0 | Refills: 0 | Status: DISCONTINUED | OUTPATIENT
Start: 2017-12-08 | End: 2017-12-08

## 2017-12-08 RX ORDER — ALBUTEROL 90 UG/1
4 AEROSOL, METERED ORAL EVERY 4 HOURS
Qty: 0 | Refills: 0 | Status: DISCONTINUED | OUTPATIENT
Start: 2017-12-08 | End: 2017-12-15

## 2017-12-08 RX ADMIN — Medication 27.5 MILLIGRAM(S): at 17:21

## 2017-12-08 RX ADMIN — Medication 1 MILLIGRAM(S): at 12:59

## 2017-12-08 RX ADMIN — ALBUTEROL 4 PUFF(S): 90 AEROSOL, METERED ORAL at 11:21

## 2017-12-08 RX ADMIN — Medication 2 MILLIGRAM(S): at 17:21

## 2017-12-08 RX ADMIN — MORPHINE SULFATE 2 MILLIGRAM(S): 50 CAPSULE, EXTENDED RELEASE ORAL at 03:19

## 2017-12-08 RX ADMIN — CHLORHEXIDINE GLUCONATE 1 APPLICATION(S): 213 SOLUTION TOPICAL at 13:00

## 2017-12-08 RX ADMIN — Medication 650 MILLIGRAM(S): at 00:03

## 2017-12-08 RX ADMIN — ALBUTEROL 4 PUFF(S): 90 AEROSOL, METERED ORAL at 17:06

## 2017-12-08 RX ADMIN — ALBUTEROL 2 PUFF(S): 90 AEROSOL, METERED ORAL at 03:02

## 2017-12-08 RX ADMIN — Medication 2 MILLIGRAM(S): at 04:56

## 2017-12-08 RX ADMIN — Medication 300 MILLIGRAM(S): at 12:59

## 2017-12-08 RX ADMIN — MORPHINE SULFATE 2 MILLIGRAM(S): 50 CAPSULE, EXTENDED RELEASE ORAL at 00:19

## 2017-12-08 RX ADMIN — Medication 1 PUFF(S): at 11:08

## 2017-12-08 RX ADMIN — BUDESONIDE AND FORMOTEROL FUMARATE DIHYDRATE 2 PUFF(S): 160; 4.5 AEROSOL RESPIRATORY (INHALATION) at 11:08

## 2017-12-08 RX ADMIN — BUDESONIDE AND FORMOTEROL FUMARATE DIHYDRATE 2 PUFF(S): 160; 4.5 AEROSOL RESPIRATORY (INHALATION) at 20:42

## 2017-12-08 RX ADMIN — Medication 40 MILLIGRAM(S): at 12:59

## 2017-12-08 RX ADMIN — Medication 1 PUFF(S): at 03:02

## 2017-12-08 RX ADMIN — VORICONAZOLE 125 MILLIGRAM(S): 10 INJECTION, POWDER, LYOPHILIZED, FOR SOLUTION INTRAVENOUS at 14:27

## 2017-12-08 RX ADMIN — Medication 650 MILLIGRAM(S): at 00:29

## 2017-12-08 RX ADMIN — Medication 1 PUFF(S): at 17:06

## 2017-12-08 RX ADMIN — ENOXAPARIN SODIUM 40 MILLIGRAM(S): 100 INJECTION SUBCUTANEOUS at 13:00

## 2017-12-08 RX ADMIN — ALBUTEROL 4 PUFF(S): 90 AEROSOL, METERED ORAL at 20:39

## 2017-12-08 RX ADMIN — Medication 650 MILLIGRAM(S): at 18:00

## 2017-12-08 RX ADMIN — Medication 1 PUFF(S): at 22:05

## 2017-12-08 RX ADMIN — ALBUTEROL 4 PUFF(S): 90 AEROSOL, METERED ORAL at 14:34

## 2017-12-08 RX ADMIN — PANTOPRAZOLE SODIUM 40 MILLIGRAM(S): 20 TABLET, DELAYED RELEASE ORAL at 12:59

## 2017-12-08 RX ADMIN — Medication 27.5 MILLIGRAM(S): at 06:32

## 2017-12-08 RX ADMIN — Medication 650 MILLIGRAM(S): at 13:30

## 2017-12-08 RX ADMIN — Medication 2 MILLIGRAM(S): at 04:22

## 2017-12-08 RX ADMIN — VORICONAZOLE 125 MILLIGRAM(S): 10 INJECTION, POWDER, LYOPHILIZED, FOR SOLUTION INTRAVENOUS at 00:02

## 2017-12-08 RX ADMIN — Medication 650 MILLIGRAM(S): at 17:22

## 2017-12-08 RX ADMIN — Medication 650 MILLIGRAM(S): at 12:59

## 2017-12-08 NOTE — PROGRESS NOTE ADULT - SUBJECTIVE AND OBJECTIVE BOX
Pt is IN RCU: Now will be under House Pulmonary Service:; Will sign off: When needed again, please call Dr Riley again at !! Pt is IN RCU: Now will be under House Pulmonary Service:; Will sign off: When needed again, please call Dr Riley at !!

## 2017-12-08 NOTE — CONSULT NOTE ADULT - PROBLEM SELECTOR RECOMMENDATION 4
controlled
Patient is an advanced illness bed. Met with Girlfriend and aunt. Emotional support provided.

## 2017-12-08 NOTE — CONSULT NOTE ADULT - ATTENDING COMMENTS
I have seen and evaluated the patient with the GI Fellow and GI Team.  I agree with the findings, formulation and plan of care as documented in the resident / fellows note and edited where appropriate.
Patient denies new symptoms, states his left HP is improving.  On exam he has 4+/5 power in the UE and LE and he ambulates.  He is refusing MRI brain and I do not think it crucial to get this as his presentation would be extremely unlikely to be neurosarcoidosis.
Patient seen and examined.  Agree with NP note.

## 2017-12-08 NOTE — CONSULT NOTE ADULT - PROBLEM SELECTOR RECOMMENDATION 9
? 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 ot from bronchiectasis? eliquis have been dced: needs card to se: In addition, he may need IR help with embolization, it his hemoptysis increases: He has had pleurodesis on the left side.
hold elliquis till cleared by CT surgery  repeat mri to assess stroke vs. sarcoid
Continue care as per Primary team.

## 2017-12-08 NOTE — CONSULT NOTE ADULT - SUBJECTIVE AND OBJECTIVE BOX
HPI:  47 year old male with a PMHx of sarcoidosis, HTN, asthma, and COPD presents to the ED with severe leg spasms and headache leading to a presyncopal episode, found to have a significant abnormal EKG and moderate inspiratory and expiratory wheezing in the setting of Entero/Rhino virus, admitted to [facility] for Near Syncope, r/o ACS and COPD exacerbation.  Hospital course complicated by stroke and PAF- AC on hold due to hemoptysis; 9/21-FB/BAL done by Dr. Arias. Intraop: Bleeding from DAVID (non-lingular)-Recom IR to embolize; 9/22 CT angio done per IR Lobko request.  Formal PFTS and ask Card to fix PFO now to decrease blood flow; 9/24- Developed worsening hemoptysis. Emergently kristel to OR and had  Left thoractomy DAVID. Brought to CTICU.  9/24: 1400ml EBL in OR; 10 u PRBCs;9/25-Brought back to OR for bleeding. Had Reop Thoracotomy, evac left hemothorax. CTI- DAVID space despite -40 Sxn, MEYER, L Hemiparesis (CVA) better w walking; Patient post op with prolongd air leak shila ptx, bedside pleurodesis done.  Developed SIRS after pleurodesis-> CTICU; On pressors. Pt. clinically improved in CTICU. Maintained on Steroids, weaned down to Solumedrol BID. Off pressors and transitioned to Midodrine. Still with thrombocytopenia but no bleeding. Continued on Eliquis but no ASA for h/o CVA. Neuro deficits improving.  Air leak resolved on 10/27. 10/30 Chest tube removed, cxr stable.  Steriods to po taper.  Patient stable for discharge.  11/2-Pt ambulating w PT frequently. Feels well. Pain controlled. All labs and vital signs stable. Wound healing. Arnold po steroid taper. To complete Voriconazole on Nov 4th. CXRs and reports reviewed by and cleared by Dr. Caldera. Pt. to fu for PFO closure as output. Still requires intermittent oxygen. Cleared for discharge by Dr. Caldera to acute rehab.    Patient presented to [place] ER after rehab send patient with hemoptysis, patient had 2 episodes of hemoptysis, less than 10cc in total for 24 hours.  Patient states it happens when he coughs, blood tinged sputum.  Patient otherwise doing well in rehab. (09 Nov 2017 00:32)      PERTINENT PMH REVIEWED:  [ ] YES [ ] NO           SOCIAL HISTORY:  Significant other/partner:  [ ] YES  [ ] NO            Children:  [ ] YES  [ ] NO                   Protestant/Spirituality:  Substance hx:  [ ] YES   [ ] NO           Tobacco hx:  [ ] YES  [ ] NO             Alcohol hx: [ ] YES  [ ] NO        Home Opioid hx:  [ ] YES  [ ] NO   Living Situation: [ ] Home  [ ] Long term care  [ ] Rehab    FAMILY HISTORY:  Family history of pancreatic cancer  Family history of essential hypertension (Father)    [ ] Family history non contributory     BASELINE ADLs (prior to admission):  Independent [ ] moderately [ ] fully   Dependent   [ ] moderately [ ] fully    Code Status:                      MOLST  [ ] YES [ ] NO    Living Will  [ ] YES [ ] NO    Health Care Proxy [ ] YES  [ ] NO      [ ] Surrogate  [ ] HCP  [ ] Guardian:                                                                  Phone#:    Allergies    No Known Allergies    Intolerances        MEDICATIONS  (STANDING):  acetaminophen    Suspension. 650 milliGRAM(s) Oral every 6 hours  ALBUTerol    90 MICROgram(s) HFA Inhaler 4 Puff(s) Inhalation every 4 hours  buDESOnide   90 MICROgram(s) Inhaler 1 Puff(s) Inhalation two times a day  buDESOnide 160 MICROgram(s)/formoterol 4.5 MICROgram(s) Inhaler 2 Puff(s) Inhalation two times a day  chlorhexidine 0.12% Liquid 15 milliLiter(s) Swish and Spit two times a day  chlorhexidine 4% Liquid 1 Application(s) Topical daily  diazepam    Tablet 2 milliGRAM(s) Oral two times a day  enoxaparin Injectable 40 milliGRAM(s) SubCutaneous daily  fentaNYL   Patch  50 MICROgram(s)/Hr 1 Patch Transdermal every 72 hours  ferrous    sulfate Liquid 300 milliGRAM(s) Enteral Tube daily  folic acid 1 milliGRAM(s) Oral daily  insulin lispro (HumaLOG) corrective regimen sliding scale   SubCutaneous every 6 hours  ipratropium 17 MICROgram(s) HFA Inhaler 1 Puff(s) Inhalation every 6 hours  pantoprazole  Injectable 40 milliGRAM(s) IV Push daily  polyethylene glycol 3350 17 Gram(s) Oral at bedtime  predniSONE   Tablet 10 milliGRAM(s) Oral daily  senna Syrup 10 milliLiter(s) Oral at bedtime  valproate sodium IVPB 500 milliGRAM(s) IV Intermittent every 12 hours  voriconazole IVPB 300 milliGRAM(s) IV Intermittent every 12 hours    MEDICATIONS  (PRN):  acetaminophen   Tablet 650 milliGRAM(s) Oral every 6 hours PRN For Temp greater than 38 C (100.4 F)      PRESENT SYMPTOMS:  Source: [ ] Patient   [ ] Family   [ ] Team     Pain: [ ] YES [ ] NO  Onset:                    Location:                          Duration:                 Character:            Aggravating factors:                        Relieving factors:    Radiation:              Timing:                             Severity:      Dyspnea: [ ] YES [ ] NO - Mild [ ]  Moderate [ ]  Severe [ ]    Anxiety: [ ] YES [ ] NO  Fatigue: [ ] YES [ ] NO   Nausea: [ ] YES [ ] NO  Loss of appetite: [ ] YES [ ] NO   Constipation: [ ] YES [ ] NO     Other Symptoms:  [ ] All other review of systems negative   [ ] Unable to obtain due to poor mentation     Does patient meet criteria for Severe Protein Calorie Malnutrition?  Yes [ ]  No [ ]  PPSV 30% or below [ ]  Anasarca [ ]  Albumin < 2 [ ] Catabolic State [ ] Poor nutritional intake [ ] Significant weight loss [ ]      Palliative Performance Status Version 2:         %  ECOG -        Vital Signs Last 24 Hrs  T(C): 37.2 (08 Dec 2017 10:16), Max: 37.2 (08 Dec 2017 10:16)  T(F): 99 (08 Dec 2017 10:16), Max: 99 (08 Dec 2017 10:16)  HR: 108 (08 Dec 2017 11:58) (74 - 115)  BP: 139/87 (08 Dec 2017 10:16) (110/68 - 192/101)  BP(mean): 97 (07 Dec 2017 21:30) (89 - 97)  RR: 14 (08 Dec 2017 10:16) (10 - 28)  SpO2: 100% (08 Dec 2017 11:58) (92% - 100%)    Physical Exam:    General: [ ] Alert,  A&O x     [ ] lethargic   [ ] Agitated   [ ] Cachexia   HEENT: [ ] Normal   [ ] Dry mouth   [ ] ET Tube    [ ] Trach   Lungs: [ ] Clear [ ] Rhonchi  [ ] Crackles [ ] Wheezing [ ] Tachypnea  [ ] Audible excessive secretions    Cardiovascular:  [ ] Regular rate and rhythm  [ ] Irregular [ ] Tachycardia   [ ] Bradycardia   Abdomen: [ ] Soft  [ ] Distended  [ ] +BS  [ ] Non tender [ ] Tender  [ ]PEG   [ ] NGT   Last BM:     Genitourinary: [ ] Normal [ ] Incontinent   [ ] Oliguria/Anuria   [ ] [place]  Musculoskeletal:  [ ] Normal   [ ] Generalized weakness  [ ] Bedbound  [ ] Edema   Neurological: [ ] No focal deficits  [ ] Cognitive impairment     Skin: [ ] Normal   [ ] Pressure ulcers     LABS:                        8.1    7.85  )-----------( 235      ( 08 Dec 2017 05:20 )             27.2     12-08    142  |  91<L>  |  10  ----------------------------<  100<H>  4.0   |  44<H>  |  0.66    Ca    8.7      08 Dec 2017 05:20  Phos  4.3     12-08  Mg     1.9     12-08    TPro  5.7<L>  /  Alb  2.4<L>  /  TBili  0.2  /  DBili  x   /  AST  19  /  ALT  7   /  AlkPhos  84  12-08    PT/INR - ( 08 Dec 2017 01:15 )   PT: 14.8 SEC;   INR: 1.28              I&O's Summary    07 Dec 2017 07:01  -  08 Dec 2017 07:00  --------------------------------------------------------  IN: 500 mL / OUT: 1600 mL / NET: -1100 mL    08 Dec 2017 07:01  -  08 Dec 2017 12:09  --------------------------------------------------------  IN: 0 mL / OUT: 200 mL / NET: -200 mL        RADIOLOGY & ADDITIONAL STUDIES:    Referrals:  Hospice [ ]   Chaplaincy [ ]    Child Life [ ]   Social Work [ ]   Case Management [ ]   Holistic Therapy [ ] HPI:  47 year old male with sarcoidosis, cerebrovascular infarct with PFO on echo and paroxysmal afib in September started on eliquis c/b recurrent hemoptysis (Eliquis d/c'd on 9/20, then restarted between 10/23-27 after hemoptysis improved), d/c'd to rehab then returned 11/9 for recurrent hemoptysis requiring intubation and CTICU admission for management of hemoptysis including IR embolization (11/16) and serial bronch (11/17 and 11/20) in CTICU with no active bleeding visualized, now remains intubated with difficulty weaning vent as during CPAP trials pt fails to initiate breaths, unclear etiology. Transferred to MICU from CTICU for continued vent management and attempted weaning. Patient had bedside trach placed. The placement was complicated by hypoxia and bleeding. Patient had PEG tube placed. Patient eventually transferred to RCU for continued management.  On examination patient minimally responsive. Aunt and girlfriend at bedside.        PERTINENT PMH REVIEWED:  [x ] YES [ ] NO           SOCIAL HISTORY:  Significant other/partner:  [x ] YES  [ ] NO            Children:  [ ] YES  [ x] NO                   Adventist/Spirituality: Pentascostal  Substance hx:  [ ] YES   [x ] NO           Tobacco hx:  [ ] YES  [ x] NO             Alcohol hx: [ ] YES  [x ] NO        Home Opioid hx:  [ ] YES  [ x] NO   Living Situation: [x ] Home  [ ] Long term care  [ ] Rehab    FAMILY HISTORY:  Family history of pancreatic cancer  Family history of essential hypertension (Father)    [ ] Family history non contributory     BASELINE ADLs (prior to admission):  Independent [ x] moderately [ ] fully   Dependent   [ ] moderately [ ] fully    Code Status:                      MOLST  [ ] YES [x ] NO    Living Will  [ ] YES [x ] NO    Health Care Proxy [ ] YES  [x ] NO      [ x] Surrogate  [ ] HCP  [ ] Guardian:    Marissa Goodrichford                                Phone#: [phone]    Allergies    No Known Allergies    Intolerances        MEDICATIONS  (STANDING):  acetaminophen    Suspension. 650 milliGRAM(s) Oral every 6 hours  ALBUTerol    90 MICROgram(s) HFA Inhaler 4 Puff(s) Inhalation every 4 hours  buDESOnide   90 MICROgram(s) Inhaler 1 Puff(s) Inhalation two times a day  buDESOnide 160 MICROgram(s)/formoterol 4.5 MICROgram(s) Inhaler 2 Puff(s) Inhalation two times a day  chlorhexidine 0.12% Liquid 15 milliLiter(s) Swish and Spit two times a day  chlorhexidine 4% Liquid 1 Application(s) Topical daily  diazepam    Tablet 2 milliGRAM(s) Oral two times a day  enoxaparin Injectable 40 milliGRAM(s) SubCutaneous daily  fentaNYL   Patch  50 MICROgram(s)/Hr 1 Patch Transdermal every 72 hours  ferrous    sulfate Liquid 300 milliGRAM(s) Enteral Tube daily  folic acid 1 milliGRAM(s) Oral daily  insulin lispro (HumaLOG) corrective regimen sliding scale   SubCutaneous every 6 hours  ipratropium 17 MICROgram(s) HFA Inhaler 1 Puff(s) Inhalation every 6 hours  pantoprazole  Injectable 40 milliGRAM(s) IV Push daily  polyethylene glycol 3350 17 Gram(s) Oral at bedtime  predniSONE   Tablet 10 milliGRAM(s) Oral daily  senna Syrup 10 milliLiter(s) Oral at bedtime  valproate sodium IVPB 500 milliGRAM(s) IV Intermittent every 12 hours  voriconazole IVPB 300 milliGRAM(s) IV Intermittent every 12 hours    MEDICATIONS  (PRN):  acetaminophen   Tablet 650 milliGRAM(s) Oral every 6 hours PRN For Temp greater than 38 C (100.4 F)      PRESENT SYMPTOMS:  Source: [ ] Patient   [ ] Family   [ ] Team     Pain: [ ] YES [ ] NO  Onset:                    Location:                          Duration:                 Character:            Aggravating factors:                        Relieving factors:    Radiation:              Timing:                             Severity:      Dyspnea: [ ] YES [ ] NO - Mild [ ]  Moderate [ ]  Severe [ ]    Anxiety: [ ] YES [ ] NO  Fatigue: [ ] YES [ ] NO   Nausea: [ ] YES [ ] NO  Loss of appetite: [ ] YES [ ] NO   Constipation: [ ] YES [ ] NO     Other Symptoms:  [ ] All other review of systems negative   [ x] Unable to obtain due to poor mentation     Does patient meet criteria for Severe Protein Calorie Malnutrition?  Yes [ ]  No [ ]  PPSV 30% or below [ ]  Anasarca [ ]  Albumin < 2 [ ] Catabolic State [ ] Poor nutritional intake [ ] Significant weight loss [ ]      Palliative Performance Status Version 2:        20 %    Vital Signs Last 24 Hrs  T(C): 37.2 (08 Dec 2017 10:16), Max: 37.2 (08 Dec 2017 10:16)  T(F): 99 (08 Dec 2017 10:16), Max: 99 (08 Dec 2017 10:16)  HR: 108 (08 Dec 2017 11:58) (74 - 115)  BP: 139/87 (08 Dec 2017 10:16) (110/68 - 192/101)  BP(mean): 97 (07 Dec 2017 21:30) (89 - 97)  RR: 14 (08 Dec 2017 10:16) (10 - 28)  SpO2: 100% (08 Dec 2017 11:58) (92% - 100%)    Physical Exam:    General: [ ] Alert,  A&O x     [ x] lethargic   [ ] Agitated   [ ] Cachexia   HEENT: [ ] Normal   [ ] Dry mouth   [ ] ET Tube    [ x] Trach   Lungs: [x ] Clear [ ] Rhonchi  [ ] Crackles [ ] Wheezing [ ] Tachypnea  [ ] Audible excessive secretions    Cardiovascular:  [ ] Regular rate and rhythm  [ ] Irregular [x] Tachycardia   [ ] Bradycardia   Abdomen: [ ] Soft  [ ] Distended  [ ] +BS  [ ] Non tender [ ] Tender  [x ]PEG   [ ] NGT   [x] rectal tube  Genitourinary: [ ] Normal [ ] Incontinent   [ ] Oliguria/Anuria   [ x] Pickens  Musculoskeletal:  [ ] Normal   [ ] Generalized weakness  [ x] Bedbound  [ ] Edema   Neurological: [ ] No focal deficits  [ ] Cognitive impairment  [x]   Skin: [x ] Normal   [ ] Pressure ulcers     LABS:                        8.1    7.85  )-----------( 235      ( 08 Dec 2017 05:20 )             27.2     12-08    142  |  91<L>  |  10  ----------------------------<  100<H>  4.0   |  44<H>  |  0.66    Ca    8.7      08 Dec 2017 05:20  Phos  4.3     12-08  Mg     1.9     12-08    TPro  5.7<L>  /  Alb  2.4<L>  /  TBili  0.2  /  DBili  x   /  AST  19  /  ALT  7   /  AlkPhos  84  12-08    PT/INR - ( 08 Dec 2017 01:15 )   PT: 14.8 SEC;   INR: 1.28              I&O's Summary    07 Dec 2017 07:01  -  08 Dec 2017 07:00  --------------------------------------------------------  IN: 500 mL / OUT: 1600 mL / NET: -1100 mL    08 Dec 2017 07:01  -  08 Dec 2017 12:09  --------------------------------------------------------  IN: 0 mL / OUT: 200 mL / NET: -200 mL        RADIOLOGY & ADDITIONAL STUDIES:    Referrals:  Hospice [ ]   Chaplaincy [ ]    Child Life [ ]   Social Work [ ]   Case Management [ ]   Holistic Therapy [ ] HPI:  47 year old male with sarcoidosis, cerebrovascular infarct with PFO on echo and paroxysmal afib in September started on eliquis c/b recurrent hemoptysis (Eliquis d/c'd on 9/20, then restarted between 10/23-27 after hemoptysis improved), d/c'd to rehab then returned 11/9 for recurrent hemoptysis requiring intubation and CTICU admission for management of hemoptysis including IR embolization (11/16) and serial bronch (11/17 and 11/20) in CTICU with no active bleeding visualized, now remains intubated with difficulty weaning vent as during CPAP trials pt fails to initiate breaths, unclear etiology. Transferred to MICU from CTICU for continued vent management and attempted weaning. Patient had bedside trach placed. The placement was complicated by hypoxia and bleeding. Patient had PEG tube placed. Patient eventually transferred to RCU for continued management.  On examination patient minimally responsive. Aunt and girlfriend at bedside.        PERTINENT PMH REVIEWED:  [x ] YES [ ] NO           SOCIAL HISTORY:  Significant other/partner:  [x ] YES  [ ] NO            Children:  [ ] YES  [ x] NO                   Buddhism/Spirituality: Pentascostal  Substance hx:  [ ] YES   [x ] NO           Tobacco hx:  [ ] YES  [ x] NO             Alcohol hx: [ ] YES  [x ] NO        Home Opioid hx:  [ ] YES  [ x] NO   Living Situation: [x ] Home  [ ] Long term care  [ ] Rehab    FAMILY HISTORY:  Family history of pancreatic cancer  Family history of essential hypertension (Father)    [ ] Family history non contributory     BASELINE ADLs (prior to admission):  Independent [ x] moderately [ ] fully   Dependent   [ ] moderately [ ] fully    Code Status:                      MOLST  [ ] YES [x ] NO    Living Will  [ ] YES [x ] NO    Health Care Proxy [ ] YES  [x ] NO      [ x] Surrogate  [ ] HCP  [ ] Guardian:    Marissa Goodrichford                                Phone#: [phone]    Allergies    No Known Allergies    Intolerances        MEDICATIONS  (STANDING):  acetaminophen    Suspension. 650 milliGRAM(s) Oral every 6 hours  ALBUTerol    90 MICROgram(s) HFA Inhaler 4 Puff(s) Inhalation every 4 hours  buDESOnide   90 MICROgram(s) Inhaler 1 Puff(s) Inhalation two times a day  buDESOnide 160 MICROgram(s)/formoterol 4.5 MICROgram(s) Inhaler 2 Puff(s) Inhalation two times a day  chlorhexidine 0.12% Liquid 15 milliLiter(s) Swish and Spit two times a day  chlorhexidine 4% Liquid 1 Application(s) Topical daily  diazepam    Tablet 2 milliGRAM(s) Oral two times a day  enoxaparin Injectable 40 milliGRAM(s) SubCutaneous daily  fentaNYL   Patch  50 MICROgram(s)/Hr 1 Patch Transdermal every 72 hours  ferrous    sulfate Liquid 300 milliGRAM(s) Enteral Tube daily  folic acid 1 milliGRAM(s) Oral daily  insulin lispro (HumaLOG) corrective regimen sliding scale   SubCutaneous every 6 hours  ipratropium 17 MICROgram(s) HFA Inhaler 1 Puff(s) Inhalation every 6 hours  pantoprazole  Injectable 40 milliGRAM(s) IV Push daily  polyethylene glycol 3350 17 Gram(s) Oral at bedtime  predniSONE   Tablet 10 milliGRAM(s) Oral daily  senna Syrup 10 milliLiter(s) Oral at bedtime  valproate sodium IVPB 500 milliGRAM(s) IV Intermittent every 12 hours  voriconazole IVPB 300 milliGRAM(s) IV Intermittent every 12 hours    MEDICATIONS  (PRN):  acetaminophen   Tablet 650 milliGRAM(s) Oral every 6 hours PRN For Temp greater than 38 C (100.4 F)      PRESENT SYMPTOMS:  Source: [ ] Patient   [ ] Family   [ ] Team     Pain: [ ] YES [ ] NO  Onset:                    Location:                          Duration:                 Character:            Aggravating factors:                        Relieving factors:    Radiation:              Timing:                             Severity:      Dyspnea: [ ] YES [ ] NO - Mild [ ]  Moderate [ ]  Severe [ ]    Anxiety: [ ] YES [ ] NO  Fatigue: [ ] YES [ ] NO   Nausea: [ ] YES [ ] NO  Loss of appetite: [ ] YES [ ] NO   Constipation: [ ] YES [ ] NO     Other Symptoms:  [ ] All other review of systems negative   [ x] Unable to obtain due to poor mentation     Does patient meet criteria for Severe Protein Calorie Malnutrition?  Yes [ ]  No [ ]  PPSV 30% or below [ ]  Anasarca [ ]  Albumin < 2 [ ] Catabolic State [ ] Poor nutritional intake [ ] Significant weight loss [ ]      Palliative Performance Status Version 2:        20 %    Vital Signs Last 24 Hrs  T(C): 37.2 (08 Dec 2017 10:16), Max: 37.2 (08 Dec 2017 10:16)  T(F): 99 (08 Dec 2017 10:16), Max: 99 (08 Dec 2017 10:16)  HR: 108 (08 Dec 2017 11:58) (74 - 115)  BP: 139/87 (08 Dec 2017 10:16) (110/68 - 192/101)  BP(mean): 97 (07 Dec 2017 21:30) (89 - 97)  RR: 14 (08 Dec 2017 10:16) (10 - 28)  SpO2: 100% (08 Dec 2017 11:58) (92% - 100%)    Physical Exam:    General: [ ] Alert,  A&O x     [ x] lethargic   [ ] Agitated   [ ] Cachexia   HEENT: [ ] Normal   [ ] Dry mouth   [ ] ET Tube    [ x] Trach   Lungs: [x ] Clear [ ] Rhonchi  [ ] Crackles [ ] Wheezing [ ] Tachypnea  [ ] Audible excessive secretions    Cardiovascular:  [ ] Regular rate and rhythm  [ ] Irregular [x] Tachycardia   [ ] Bradycardia   Abdomen: [ ] Soft  [ ] Distended  [ ] +BS  [ ] Non tender [ ] Tender  [x ]PEG   [ ] NGT   [x] rectal tube  Genitourinary: [ ] Normal [ ] Incontinent   [ ] Oliguria/Anuria   [ x] [place]ens  Musculoskeletal:  [ ] Normal   [ ] Generalized weakness  [ x] Bedbound  [ ] Edema   Neurological: [ ] No focal deficits  [ ] Cognitive impairment  [x] sedated  Skin: [x ] Normal   [ ] Pressure ulcers     LABS:                        8.1    7.85  )-----------( 235      ( 08 Dec 2017 05:20 )             27.2     12-08    142  |  91<L>  |  10  ----------------------------<  100<H>  4.0   |  44<H>  |  0.66    Ca    8.7      08 Dec 2017 05:20  Phos  4.3     12-08  Mg     1.9     12-08    TPro  5.7<L>  /  Alb  2.4<L>  /  TBili  0.2  /  DBili  x   /  AST  19  /  ALT  7   /  AlkPhos  84  12-08    PT/INR - ( 08 Dec 2017 01:15 )   PT: 14.8 SEC;   INR: 1.28              I&O's Summary    07 Dec 2017 07:01  -  08 Dec 2017 07:00  --------------------------------------------------------  IN: 500 mL / OUT: 1600 mL / NET: -1100 mL    08 Dec 2017 07:01  -  08 Dec 2017 12:09  --------------------------------------------------------  IN: 0 mL / OUT: 200 mL / NET: -200 mL        RADIOLOGY & ADDITIONAL STUDIES:    Referrals:  Hospice [ ]   Chaplaincy [ ]    Child Life [ ]   Social Work [ ]   Case Management [ ]   Holistic Therapy [ ]

## 2017-12-08 NOTE — PROGRESS NOTE ADULT - SUBJECTIVE AND OBJECTIVE BOX
CHIEF COMPLAINT:    Interval Events:    REVIEW OF SYSTEMS:  Constitutional:   Eyes:  ENT:  CV:  Resp:  GI:  :  MSK:  Integumentary:  Neurological:  Psychiatric:  Endocrine:  Hematologic/Lymphatic:  Allergic/Immunologic:  [ ] All other systems negative  [ ] Unable to assess ROS because ________    OBJECTIVE:  ICU Vital Signs Last 24 Hrs  T(C): 36.4 (08 Dec 2017 06:30), Max: 37.2 (07 Dec 2017 12:00)  T(F): 97.6 (08 Dec 2017 06:30), Max: 98.9 (07 Dec 2017 12:00)  HR: 112 (08 Dec 2017 07:51) (74 - 115)  BP: 118/73 (08 Dec 2017 06:30) (110/68 - 192/101)  BP(mean): 97 (07 Dec 2017 21:30) (89 - 97)  ABP: --  ABP(mean): --  RR: 16 (08 Dec 2017 06:30) (10 - 28)  SpO2: 100% (08 Dec 2017 07:51) (92% - 100%)    Mode: AC/ CMV (Assist Control/ Continuous Mandatory Ventilation), RR (machine): 12, TV (machine): 500, FiO2: 40, PEEP: 5, ITime: 1, MAP: 13, PC: 40, PIP: 53    12-07 @ 07:01  -  12-08 @ 07:00  --------------------------------------------------------  IN: 500 mL / OUT: 1600 mL / NET: -1100 mL    12-08 @ 07:01  -  12-08 @ 09:16  --------------------------------------------------------  IN: 0 mL / OUT: 200 mL / NET: -200 mL      CAPILLARY BLOOD GLUCOSE      POCT Blood Glucose.: 99 mg/dL (08 Dec 2017 05:33)      PHYSICAL EXAM:  General:   HEENT:   Lymph Nodes:  Neck:   Respiratory:   Cardiovascular:   Abdomen:   Extremities:   Skin:   Neurological:  Psychiatry:    HOSPITAL MEDICATIONS:  MEDICATIONS  (STANDING):  acetaminophen    Suspension. 650 milliGRAM(s) Oral every 6 hours  ALBUTerol    90 MICROgram(s) HFA Inhaler 2 Puff(s) Inhalation every 6 hours  buDESOnide   90 MICROgram(s) Inhaler 1 Puff(s) Inhalation two times a day  buDESOnide 160 MICROgram(s)/formoterol 4.5 MICROgram(s) Inhaler 2 Puff(s) Inhalation two times a day  chlorhexidine 0.12% Liquid 15 milliLiter(s) Swish and Spit two times a day  chlorhexidine 4% Liquid 1 Application(s) Topical daily  diazepam    Tablet 2 milliGRAM(s) Oral two times a day  enoxaparin Injectable 40 milliGRAM(s) SubCutaneous daily  fentaNYL   Patch  50 MICROgram(s)/Hr 1 Patch Transdermal every 72 hours  ferrous    sulfate Liquid 300 milliGRAM(s) Enteral Tube daily  folic acid 1 milliGRAM(s) Oral daily  insulin lispro (HumaLOG) corrective regimen sliding scale   SubCutaneous every 6 hours  ipratropium 17 MICROgram(s) HFA Inhaler 1 Puff(s) Inhalation every 6 hours  pantoprazole  Injectable 40 milliGRAM(s) IV Push daily  polyethylene glycol 3350 17 Gram(s) Oral at bedtime  predniSONE   Tablet 10 milliGRAM(s) Oral daily  senna Syrup 10 milliLiter(s) Oral at bedtime  valproate sodium IVPB 500 milliGRAM(s) IV Intermittent every 12 hours  voriconazole IVPB 300 milliGRAM(s) IV Intermittent every 12 hours    MEDICATIONS  (PRN):  acetaminophen   Tablet 650 milliGRAM(s) Oral every 6 hours PRN For Temp greater than 38 C (100.4 F)      LABS:                        8.1    7.85  )-----------( 235      ( 08 Dec 2017 05:20 )             27.2     12-08    142  |  91<L>  |  10  ----------------------------<  100<H>  4.0   |  44<H>  |  0.66    Ca    8.7      08 Dec 2017 05:20  Phos  4.3     12-08  Mg     1.9     12-08    TPro  5.7<L>  /  Alb  2.4<L>  /  TBili  0.2  /  DBili  x   /  AST  19  /  ALT  7   /  AlkPhos  84  12-08    PT/INR - ( 08 Dec 2017 01:15 )   PT: 14.8 SEC;   INR: 1.28              Arterial Blood Gas:  12-08 @ 08:40  7.47/65/137/44/99/21.0  ABG lactate: --  Arterial Blood Gas:  12-08 @ 06:00  7.47/64/38/44/73.9/20.8  ABG lactate: 1.6  Arterial Blood Gas:  12-08 @ 03:00  7.30/98/107/42/98.0/19.0  ABG lactate: 0.6        MICROBIOLOGY:     RADIOLOGY:  [ ] Reviewed and interpreted by me    PULMONARY FUNCTION TESTS:    EKG: CHIEF COMPLAINT: Patient is a 47y old  Male who presents with a chief complaint of hemoptysis (09 Nov 2017 00:32)    Interval Events: Transferred from MICU overnight.     REVIEW OF SYSTEMS:  Constitutional: Denies pain  Eyes:  ENT:  CV: Denies CP  Resp: Denies SOB  GI:  :  MSK:  Integumentary:  Neurological:  Psychiatric:  Endocrine:  Hematologic/Lymphatic:  Allergic/Immunologic:  [x] All other systems negative  [ ] Unable to assess ROS because ________    OBJECTIVE:  ICU Vital Signs Last 24 Hrs  T(C): 36.4 (08 Dec 2017 06:30), Max: 37.2 (07 Dec 2017 12:00)  T(F): 97.6 (08 Dec 2017 06:30), Max: 98.9 (07 Dec 2017 12:00)  HR: 112 (08 Dec 2017 07:51) (74 - 115)  BP: 118/73 (08 Dec 2017 06:30) (110/68 - 192/101)  BP(mean): 97 (07 Dec 2017 21:30) (89 - 97)  ABP: --  ABP(mean): --  RR: 16 (08 Dec 2017 06:30) (10 - 28)  SpO2: 100% (08 Dec 2017 07:51) (92% - 100%)    Mode: AC/ CMV (Assist Control/ Continuous Mandatory Ventilation), RR (machine): 12, TV (machine): 500, FiO2: 40, PEEP: 5, ITime: 1, MAP: 13, PC: 40, PIP: 53    12-07 @ 07:01 - 12-08 @ 07:00  --------------------------------------------------------  IN: 500 mL / OUT: 1600 mL / NET: -1100 mL    12-08 @ 07:01  -  12-08 @ 09:16  --------------------------------------------------------  IN: 0 mL / OUT: 200 mL / NET: -200 mL      CAPILLARY BLOOD GLUCOSE      POCT Blood Glucose.: 99 mg/dL (08 Dec 2017 05:33)      HOSPITAL MEDICATIONS:  MEDICATIONS  (STANDING):  acetaminophen    Suspension. 650 milliGRAM(s) Oral every 6 hours  ALBUTerol    90 MICROgram(s) HFA Inhaler 2 Puff(s) Inhalation every 6 hours  buDESOnide   90 MICROgram(s) Inhaler 1 Puff(s) Inhalation two times a day  buDESOnide 160 MICROgram(s)/formoterol 4.5 MICROgram(s) Inhaler 2 Puff(s) Inhalation two times a day  chlorhexidine 0.12% Liquid 15 milliLiter(s) Swish and Spit two times a day  chlorhexidine 4% Liquid 1 Application(s) Topical daily  diazepam    Tablet 2 milliGRAM(s) Oral two times a day  enoxaparin Injectable 40 milliGRAM(s) SubCutaneous daily  fentaNYL   Patch  50 MICROgram(s)/Hr 1 Patch Transdermal every 72 hours  ferrous    sulfate Liquid 300 milliGRAM(s) Enteral Tube daily  folic acid 1 milliGRAM(s) Oral daily  insulin lispro (HumaLOG) corrective regimen sliding scale   SubCutaneous every 6 hours  ipratropium 17 MICROgram(s) HFA Inhaler 1 Puff(s) Inhalation every 6 hours  pantoprazole  Injectable 40 milliGRAM(s) IV Push daily  polyethylene glycol 3350 17 Gram(s) Oral at bedtime  predniSONE   Tablet 10 milliGRAM(s) Oral daily  senna Syrup 10 milliLiter(s) Oral at bedtime  valproate sodium IVPB 500 milliGRAM(s) IV Intermittent every 12 hours  voriconazole IVPB 300 milliGRAM(s) IV Intermittent every 12 hours    MEDICATIONS  (PRN):  acetaminophen   Tablet 650 milliGRAM(s) Oral every 6 hours PRN For Temp greater than 38 C (100.4 F)      LABS:                        8.1    7.85  )-----------( 235      ( 08 Dec 2017 05:20 )             27.2     12-08    142  |  91<L>  |  10  ----------------------------<  100<H>  4.0   |  44<H>  |  0.66    Ca    8.7      08 Dec 2017 05:20  Phos  4.3     12-08  Mg     1.9     12-08    TPro  5.7<L>  /  Alb  2.4<L>  /  TBili  0.2  /  DBili  x   /  AST  19  /  ALT  7   /  AlkPhos  84  12-08    PT/INR - ( 08 Dec 2017 01:15 )   PT: 14.8 SEC;   INR: 1.28              Arterial Blood Gas:  12-08 @ 08:40  7.47/65/137/44/99/21.0  ABG lactate: --  Arterial Blood Gas:  12-08 @ 06:00  7.47/64/38/44/73.9/20.8  ABG lactate: 1.6  Arterial Blood Gas:  12-08 @ 03:00  7.30/98/107/42/98.0/19.0  ABG lactate: 0.6        MICROBIOLOGY:     RADIOLOGY:  [ ] Reviewed and interpreted by me    PULMONARY FUNCTION TESTS:    EKG:

## 2017-12-08 NOTE — PROVIDER CONTACT NOTE (OTHER) - SITUATION
pt feeds on hold as per orders, can pt receive meds through peg tube.  pt due for valium, however received ativan IV previously.

## 2017-12-08 NOTE — CONSULT NOTE ADULT - PROBLEM SELECTOR RECOMMENDATION 2
still minor wheezing: cont steroids and he will need it for  long term.   And Symbicort with Atrovent nebs: can switch ti Spiriva too
Most likely secondary to sarcoidosis. Patient was not breathing with vent earlier. Requiring sedation with ativan and fentanyl patch, to optimize patient on vent. Continue care as per RCU.

## 2017-12-08 NOTE — PROGRESS NOTE ADULT - ATTENDING COMMENTS
extensive records reviewed  sarcoidosis, s/p DAVID resection for hemoptysis, aspergilloma, followed by repeat hemoptysis, IR embolization, unable to be weaned from vent  s/p trach, peg, had been requiring high levels of sedation in the icu.  overnight, changed to PC due to high peak airway pressures, worsening hypercapnea, also sedated with ativan. f/u abg with alk with pco2 to 60s  this morning, pt sedated after ativan, breathing with the vent, post albuterol,  change back to AC. Plateau pressure is 15, peak airway pressures about 30  continue with AC. increase TV to 450. ativan and albuterol round the clock  f/u ABG in pm.

## 2017-12-09 ENCOUNTER — TRANSCRIPTION ENCOUNTER (OUTPATIENT)
Age: 47
End: 2017-12-09

## 2017-12-09 LAB
BUN SERPL-MCNC: 10 MG/DL — SIGNIFICANT CHANGE UP (ref 7–23)
CALCIUM SERPL-MCNC: 8.4 MG/DL — SIGNIFICANT CHANGE UP (ref 8.4–10.5)
CHLORIDE SERPL-SCNC: 91 MMOL/L — LOW (ref 98–107)
CO2 SERPL-SCNC: 44 MMOL/L — HIGH (ref 22–31)
CREAT SERPL-MCNC: 0.67 MG/DL — SIGNIFICANT CHANGE UP (ref 0.5–1.3)
GLUCOSE BLDC GLUCOMTR-MCNC: 104 MG/DL — HIGH (ref 70–99)
GLUCOSE BLDC GLUCOMTR-MCNC: 110 MG/DL — HIGH (ref 70–99)
GLUCOSE BLDC GLUCOMTR-MCNC: 130 MG/DL — HIGH (ref 70–99)
GLUCOSE BLDC GLUCOMTR-MCNC: 88 MG/DL — SIGNIFICANT CHANGE UP (ref 70–99)
GLUCOSE BLDC GLUCOMTR-MCNC: 91 MG/DL — SIGNIFICANT CHANGE UP (ref 70–99)
GLUCOSE SERPL-MCNC: 135 MG/DL — HIGH (ref 70–99)
HCT VFR BLD CALC: 25.3 % — LOW (ref 39–50)
HGB BLD-MCNC: 8 G/DL — LOW (ref 13–17)
MCHC RBC-ENTMCNC: 30.4 PG — SIGNIFICANT CHANGE UP (ref 27–34)
MCHC RBC-ENTMCNC: 31.6 % — LOW (ref 32–36)
MCV RBC AUTO: 96.2 FL — SIGNIFICANT CHANGE UP (ref 80–100)
NRBC # FLD: 0 — SIGNIFICANT CHANGE UP
PLATELET # BLD AUTO: 225 K/UL — SIGNIFICANT CHANGE UP (ref 150–400)
PMV BLD: 12.5 FL — SIGNIFICANT CHANGE UP (ref 7–13)
POTASSIUM SERPL-MCNC: 3.2 MMOL/L — LOW (ref 3.5–5.3)
POTASSIUM SERPL-SCNC: 3.2 MMOL/L — LOW (ref 3.5–5.3)
RBC # BLD: 2.63 M/UL — LOW (ref 4.2–5.8)
RBC # FLD: 15.3 % — HIGH (ref 10.3–14.5)
SODIUM SERPL-SCNC: 142 MMOL/L — SIGNIFICANT CHANGE UP (ref 135–145)
WBC # BLD: 7.4 K/UL — SIGNIFICANT CHANGE UP (ref 3.8–10.5)
WBC # FLD AUTO: 7.4 K/UL — SIGNIFICANT CHANGE UP (ref 3.8–10.5)

## 2017-12-09 PROCEDURE — 99233 SBSQ HOSP IP/OBS HIGH 50: CPT

## 2017-12-09 RX ORDER — POTASSIUM CHLORIDE 20 MEQ
40 PACKET (EA) ORAL EVERY 4 HOURS
Qty: 0 | Refills: 0 | Status: COMPLETED | OUTPATIENT
Start: 2017-12-09 | End: 2017-12-09

## 2017-12-09 RX ORDER — ACETAZOLAMIDE 250 MG/1
500 TABLET ORAL ONCE
Qty: 0 | Refills: 0 | Status: COMPLETED | OUTPATIENT
Start: 2017-12-09 | End: 2017-12-09

## 2017-12-09 RX ADMIN — Medication 27.5 MILLIGRAM(S): at 06:09

## 2017-12-09 RX ADMIN — FENTANYL CITRATE 1 PATCH: 50 INJECTION INTRAVENOUS at 11:49

## 2017-12-09 RX ADMIN — Medication 1 MILLIGRAM(S): at 11:49

## 2017-12-09 RX ADMIN — Medication 2 MILLIGRAM(S): at 06:09

## 2017-12-09 RX ADMIN — ACETAZOLAMIDE 500 MILLIGRAM(S): 250 TABLET ORAL at 11:46

## 2017-12-09 RX ADMIN — Medication 1 PUFF(S): at 03:39

## 2017-12-09 RX ADMIN — Medication 40 MILLIEQUIVALENT(S): at 11:49

## 2017-12-09 RX ADMIN — BUDESONIDE AND FORMOTEROL FUMARATE DIHYDRATE 2 PUFF(S): 160; 4.5 AEROSOL RESPIRATORY (INHALATION) at 20:26

## 2017-12-09 RX ADMIN — ENOXAPARIN SODIUM 40 MILLIGRAM(S): 100 INJECTION SUBCUTANEOUS at 11:46

## 2017-12-09 RX ADMIN — Medication 40 MILLIEQUIVALENT(S): at 14:02

## 2017-12-09 RX ADMIN — CHLORHEXIDINE GLUCONATE 1 APPLICATION(S): 213 SOLUTION TOPICAL at 11:49

## 2017-12-09 RX ADMIN — Medication 2 MILLIGRAM(S): at 20:43

## 2017-12-09 RX ADMIN — Medication 1 PUFF(S): at 15:38

## 2017-12-09 RX ADMIN — ALBUTEROL 4 PUFF(S): 90 AEROSOL, METERED ORAL at 20:24

## 2017-12-09 RX ADMIN — ALBUTEROL 4 PUFF(S): 90 AEROSOL, METERED ORAL at 09:36

## 2017-12-09 RX ADMIN — Medication 300 MILLIGRAM(S): at 11:49

## 2017-12-09 RX ADMIN — Medication 650 MILLIGRAM(S): at 18:44

## 2017-12-09 RX ADMIN — Medication 2 MILLIGRAM(S): at 00:07

## 2017-12-09 RX ADMIN — VORICONAZOLE 125 MILLIGRAM(S): 10 INJECTION, POWDER, LYOPHILIZED, FOR SOLUTION INTRAVENOUS at 12:37

## 2017-12-09 RX ADMIN — Medication 27.5 MILLIGRAM(S): at 18:44

## 2017-12-09 RX ADMIN — ALBUTEROL 4 PUFF(S): 90 AEROSOL, METERED ORAL at 11:59

## 2017-12-09 RX ADMIN — ALBUTEROL 4 PUFF(S): 90 AEROSOL, METERED ORAL at 00:52

## 2017-12-09 RX ADMIN — BUDESONIDE AND FORMOTEROL FUMARATE DIHYDRATE 2 PUFF(S): 160; 4.5 AEROSOL RESPIRATORY (INHALATION) at 09:37

## 2017-12-09 RX ADMIN — Medication 1 PUFF(S): at 22:53

## 2017-12-09 RX ADMIN — Medication 1 PUFF(S): at 09:36

## 2017-12-09 RX ADMIN — PANTOPRAZOLE SODIUM 40 MILLIGRAM(S): 20 TABLET, DELAYED RELEASE ORAL at 11:46

## 2017-12-09 RX ADMIN — CHLORHEXIDINE GLUCONATE 15 MILLILITER(S): 213 SOLUTION TOPICAL at 06:58

## 2017-12-09 RX ADMIN — ALBUTEROL 4 PUFF(S): 90 AEROSOL, METERED ORAL at 15:39

## 2017-12-09 RX ADMIN — VORICONAZOLE 125 MILLIGRAM(S): 10 INJECTION, POWDER, LYOPHILIZED, FOR SOLUTION INTRAVENOUS at 01:39

## 2017-12-09 RX ADMIN — ALBUTEROL 4 PUFF(S): 90 AEROSOL, METERED ORAL at 04:21

## 2017-12-09 RX ADMIN — Medication 2 MILLIGRAM(S): at 11:48

## 2017-12-09 NOTE — DISCHARGE NOTE ADULT - PATIENT PORTAL LINK FT
“You can access the [employer] Patient Portal, offered by [facility], by registering with the following website: [email or web address]”

## 2017-12-09 NOTE — DISCHARGE NOTE ADULT - MEDICATION SUMMARY - MEDICATIONS TO STOP TAKING
I will STOP taking the medications listed below when I get home from the hospital:    predniSONE  -- 30 milligram(s) by mouth once a day until Nov 6th then 20mg daily x 5 days then 10mg daily indefinitely.    acetaminophen 325 mg oral tablet  -- 2 tab(s) by mouth every 6 hours, As needed, Mild Pain (1 - 3)    oxyCODONE 5 mg oral tablet  -- 1 tab(s) by mouth every 4 hours, As needed, Moderate Pain (4 - 6)    apixaban 5 mg oral tablet  -- 1 tab(s) by mouth every 12 hours    divalproex sodium 500 mg oral delayed release tablet  -- 1 tab(s) by mouth 2 times a day    voriconazole 200 mg oral tablet  -- 1 tab(s) by mouth every 12 hours. Last dose on November 4th then stop    atorvastatin 80 mg oral tablet  -- 1 tab(s) by mouth once a day (at bedtime)    levalbuterol 0.63 mg/3 mL inhalation solution  -- 3 milliliter(s) inhaled every 6 hours    lidocaine 5% topical film  -- Apply on skin to affected area once a day    docusate sodium 100 mg oral capsule  -- 1 cap(s) by mouth 2 times a day    polyethylene glycol 3350 oral powder for reconstitution  -- 17 gram(s) by mouth once a day (at bedtime)

## 2017-12-09 NOTE — DISCHARGE NOTE ADULT - SECONDARY DIAGNOSIS.
Sarcoidosis Acute on chronic respiratory failure, unspecified whether with hypoxia or hypercapnia Clostridium difficile diarrhea Functional quadriplegia Hypertension Bronchopleural fistula

## 2017-12-09 NOTE — DISCHARGE NOTE ADULT - MEDICATION SUMMARY - MEDICATIONS TO TAKE
I will START or STAY ON the medications listed below when I get home from the hospital:    fentaNYL 50 mcg/hr transdermal film, extended release  -- 1 patch by transdermal patch every 72 hours  -- Indication: For History of thoracotomy    oxyCODONE 10 mg oral tablet  -- 1 tab(s) by gastrostomy tube 3 times a day, As Needed - 10)  -- Indication: For History of thoracotomy    LORazepam 1 mg oral tablet  -- 1 tab(s) by gastrostomy tube 3 times a day, As Needed  -- Indication: For Adjustment disorder with anxiety    ipratropium CFC free 17 mcg/inh inhalation aerosol  -- 1 puff(s) inhaled every 6 hours  -- Indication: For Acute on chronic respiratory failure, unspecified whether with hypoxia or hypercapnia    albuterol 90 mcg/inh inhalation aerosol  -- 4 puff(s) inhaled every 6 hours  -- Indication: For Acute on chronic respiratory failure, unspecified whether with hypoxia or hypercapnia    budesonide-formoterol 160 mcg-4.5 mcg/inh inhalation aerosol  -- 2 puff(s) inhaled 2 times a day  -- Indication: For Acute on chronic respiratory failure, unspecified whether with hypoxia or hypercapnia    ferrous sulfate 300 mg/5 mL (60 mg elemental iron) oral liquid  -- 5 milliliter(s) by gastrostomy tube once a day  -- Indication: For Anemia    melatonin 3 mg oral tablet  -- 1 tab(s) by gastrostomy tube once a day (at bedtime)  -- Indication: For Sleep    lactobacillus acidophilus oral capsule  -- 1 tab(s) by gastrostomy tube 4 times a day  -- Indication: For Clostridium difficile diarrhea    pantoprazole 40 mg oral delayed release tablet  -- 1 tab(s) by gastrostomy tube once a day  -- Indication: For Need for prophylactic measure    folic acid 1 mg oral tablet  -- 1 tab(s) by gastrostomy tube once a day  -- Indication: For Supplement

## 2017-12-09 NOTE — DISCHARGE NOTE ADULT - HOSPITAL COURSE
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     This is a patient with a complicated medical history and prolonged course of stay.  This is a 47 M with a known PFO, COPD, CVA w/ left-sided hemiparesis, sarcoidosis on steroid c/b aspergilloma treated w/ Voriconazole.  The pt initially presented to [facility] with hemoptysis, for which the patient had a DAVID resection; pt was found to have a persistent air leak with trapping followed by a hemorrhagic conversion, for which the the pt was taken back to the OR for pleurodesis pig-tail catheter placement, for which the pt was monitored, improved, and then sent to rehabilitation.  On November 11, the pt was admitted to thoracic surgery for several episodes of blood-tinged sputum followed by true hemoptysis.  Pt under went IR embolization on 11/10 for bleeding from the staple line, which was the presumed source of the hemoptysis.  The pt was then brought to the CTICU, where he underwent intubation and mechanical ventilation for airway protection and support in the setting of recurrent hemoptysis, during which the pt underwent multiple bronchoscopic examinations and an IR-guided ablation, after which the pt did not have hemoptysis.  The goal was to transfer the pt to [facility] for lung transplant evaluation.  By 11/21, the pt had been transferred to the MICU for optimization, since he was unabled to be successfully weaned off the vent in the CTICU.  On 11/23, the plan was for a tracheostomy, but tracheal aspiration showed gram-positive cocci in clusters; the A-line was removed on the 24th, and his home dose prednisone was decreased to 30 mg.  On 11/24, a bedside tracheostomy was done; pt had several bouts of hypotension between 11/24-25 and pt had numerous episodes of hypoglycemia during this time, which required correction with D5; pt was also febrile and found to be UA positive.  On 11/26, UCx showed GNR, for which pt was started on Zosyn and Levophed was discontinued; another A-line was placed; found to have Klebsiella in the urine culture and was de-escalated to Ceftriaxone; by 11/28, pt had positive blood cultures with Staph epidermidis, for which Vancomycin was continued.  On 11/29, there was discussion of a bronchopleural fistula, for which thoracic surgery was involved; no intervention was made at that time.  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  On 12/2, GI was consulted for PEG tube placement.  On 12/3, chest tube fell out and was not placed back.  On 12/4, POCUS of LLE concerning for non-compressible L femoral vein; formal B/L LE DVT study was negative.  By 12/6, pt was off Levophed and was having persistently high peak pressures possibly secondary to having a trach tube that may be too small.  At this time, pt was weaned off propofol and was switched to Fentanyl patch.  Pt remained on depakote for seizure prophylaxis s/p CVA, pt also had PEG tube placed on 12/6.  On 12/7, upper extremities were found to be swollen, for which a b/l UE DVT study was ordered, the results of which are negative. 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     This is a patient with a complicated medical history and prolonged course of stay.  This is a 47 M with a known PFO, COPD, CVA w/ left-sided hemiparesis, sarcoidosis on steroid c/b aspergilloma treated w/ Voriconazole.  The pt initially presented to [facility] with hemoptysis, for which the patient had a DAVID resection; pt was found to have a persistent air leak with trapping followed by a hemorrhagic conversion, for which the the pt was taken back to the OR for pleurodesis pig-tail catheter placement, for which the pt was monitored, improved, and then sent to rehabilitation.  On November 11, the pt was admitted to thoracic surgery for several episodes of blood-tinged sputum followed by true hemoptysis.  Pt under went IR embolization on 11/10 for bleeding from the staple line, which was the presumed source of the hemoptysis.  The pt was then brought to the CTICU, where he underwent intubation and mechanical ventilation for airway protection and support in the setting of recurrent hemoptysis, during which the pt underwent multiple bronchoscopic examinations and an IR-guided ablation, after which the pt did not have hemoptysis.  The goal was to transfer the pt to [facility] for lung transplant evaluation.  By 11/21, the pt had been transferred to the MICU for optimization, since he was unabled to be successfully weaned off the vent in the CTICU.  On 11/23, the plan was for a tracheostomy, but tracheal aspiration showed gram-positive cocci in clusters; the A-line was removed on the 24th, and his home dose prednisone was decreased to 30 mg.  On 11/24, a bedside tracheostomy was done; pt had several bouts of hypotension between 11/24-25 and pt had numerous episodes of hypoglycemia during this time, which required correction with D5; pt was also febrile and found to be UA positive.  On 11/26, UCx showed GNR, for which pt was started on Zosyn and Levophed was discontinued; another A-line was placed; found to have Klebsiella in the urine culture and was de-escalated to Ceftriaxone; by 11/28, pt had positive blood cultures with Staph epidermidis, for which Vancomycin was continued.  On 11/29, there was discussion of a bronchopleural fistula, for which thoracic surgery was involved; no intervention was made at that time.  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  On 12/2, GI was consulted for PEG tube placement.  On 12/3, chest tube fell out and was not placed back.  On 12/4, POCUS of LLE concerning for non-compressible L femoral vein; formal B/L LE DVT study was negative.  By 12/6, pt was off Levophed and was having persistently high peak pressures possibly secondary to having a trach tube that may be too small.  At this time, pt was weaned off propofol and was switched to Fentanyl patch.  Pt remained on depakote for seizure prophylaxis s/p CVA, pt also had PEG tube placed on 12/6.  On 12/7, upper extremities were found to be swollen, for which a b/l UE DVT study was ordered, the results of which are negative.  Pt was tx to RCU where he became cdiff positive - PO vanco started but pt had persistent watery diarrhea, flagyl was added and ID was consulted.  Pt started to wean from vent and tolerated some CPAP. 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     This is a patient with a complicated medical history and prolonged course of stay.  This is a 47 M with a known PFO, COPD, CVA w/ left-sided hemiparesis, sarcoidosis on steroid c/b aspergilloma treated w/ Voriconazole.  The pt initially presented to [facility] with hemoptysis, for which the patient had a DAVID resection; pt was found to have a persistent air leak with trapping followed by a hemorrhagic conversion, for which the the pt was taken back to the OR for pleurodesis pig-tail catheter placement, for which the pt was monitored, improved, and then sent to rehabilitation.  On November 11, the pt was admitted to thoracic surgery for several episodes of blood-tinged sputum followed by true hemoptysis.  Pt under went IR embolization on 11/10 for bleeding from the staple line, which was the presumed source of the hemoptysis.  The pt was then brought to the CTICU, where he underwent intubation and mechanical ventilation for airway protection and support in the setting of recurrent hemoptysis, during which the pt underwent multiple bronchoscopic examinations and an IR-guided ablation, after which the pt did not have hemoptysis.  The goal was to transfer the pt to [facility] for lung transplant evaluation.  By 11/21, the pt had been transferred to the MICU for optimization, since he was unabled to be successfully weaned off the vent in the CTICU.  On 11/23, the plan was for a tracheostomy, but tracheal aspiration showed gram-positive cocci in clusters; the A-line was removed on the 24th, and his home dose prednisone was decreased to 30 mg.  On 11/24, a bedside tracheostomy was done; pt had several bouts of hypotension between 11/24-25 and pt had numerous episodes of hypoglycemia during this time, which required correction with D5; pt was also febrile and found to be UA positive.  On 11/26, UCx showed GNR, for which pt was started on Zosyn and Levophed was discontinued; another A-line was placed; found to have Klebsiella in the urine culture and was de-escalated to Ceftriaxone; by 11/28, pt had positive blood cultures with Staph epidermidis, for which Vancomycin was continued.  On 11/29, there was discussion of a bronchopleural fistula, for which thoracic surgery was involved; no intervention was made at that time.  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  On 12/2, GI was consulted for PEG tube placement.  On 12/3, chest tube fell out and was not placed back.  On 12/4, POCUS of LLE concerning for non-compressible L femoral vein; formal B/L LE DVT study was negative.  By 12/6, pt was off Levophed and was having persistently high peak pressures possibly secondary to having a trach tube that may be too small.  At this time, pt was weaned off propofol and was switched to Fentanyl patch.  Pt remained on depakote for seizure prophylaxis s/p CVA, pt also had PEG tube placed on 12/6.  On 12/7, upper extremities were found to be swollen, for which a b/l UE DVT study was ordered, the results of which are negative.  Pt was tx to RCU where he became cdiff positive - PO vanco started but pt had persistent watery diarrhea, flagyl was added and ID was consulted. Improved.  Pt started to wean from vent and tolerated some CPAP.  Pt developed barotrauma and had open chest wound where previous pigtail catheter was placed. 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     This is a patient with a complicated medical history and prolonged course of stay.  This is a 47 M with a known PFO, COPD, CVA w/ left-sided hemiparesis, sarcoidosis on steroid c/b aspergilloma treated w/ Voriconazole.  The pt initially presented to [facility] with hemoptysis, for which the patient had a DAVID resection; pt was found to have a persistent air leak with trapping followed by a hemorrhagic conversion, for which the the pt was taken back to the OR for pleurodesis pig-tail catheter placement, for which the pt was monitored, improved, and then sent to rehabilitation.  On November 11, the pt was admitted to thoracic surgery for several episodes of blood-tinged sputum followed by true hemoptysis.  Pt under went IR embolization on 11/10 for bleeding from the staple line, which was the presumed source of the hemoptysis.  The pt was then brought to the CTICU, where he underwent intubation and mechanical ventilation for airway protection and support in the setting of recurrent hemoptysis, during which the pt underwent multiple bronchoscopic examinations and an IR-guided ablation, after which the pt did not have hemoptysis.  The goal was to transfer the pt to [facility] for lung transplant evaluation.  By 11/21, the pt had been transferred to the MICU for optimization, since he was unabled to be successfully weaned off the vent in the CTICU.  On 11/23, the plan was for a tracheostomy, but tracheal aspiration showed gram-positive cocci in clusters; the A-line was removed on the 24th, and his home dose prednisone was decreased to 30 mg.  On 11/24, a bedside tracheostomy was done; pt had several bouts of hypotension between 11/24-25 and pt had numerous episodes of hypoglycemia during this time, which required correction with D5; pt was also febrile and found to be UA positive.  On 11/26, UCx showed GNR, for which pt was started on Zosyn and Levophed was discontinued; another A-line was placed; found to have Klebsiella in the urine culture and was de-escalated to Ceftriaxone; by 11/28, pt had positive blood cultures with Staph epidermidis, for which Vancomycin was continued.  On 11/29, there was discussion of a bronchopleural fistula, for which thoracic surgery was involved; no intervention was made at that time.  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  On 12/2, GI was consulted for PEG tube placement.  On 12/3, chest tube fell out and was not placed back.  On 12/4, POCUS of LLE concerning for non-compressible L femoral vein; formal B/L LE DVT study was negative.  By 12/6, pt was off Levophed and was having persistently high peak pressures possibly secondary to having a trach tube that may be too small.  At this time, pt was weaned off propofol and was switched to Fentanyl patch.  Pt remained on depakote for seizure prophylaxis s/p CVA, pt also had PEG tube placed on 12/6.  On 12/7, upper extremities were found to be swollen, for which a b/l UE DVT study was ordered, the results of which are negative.  Pt was tx to RCU where he became cdiff positive - PO vanco started but pt had persistent watery diarrhea, flagyl was added and ID was consulted. Improved.  Pt started to wean from vent and tolerated some CPAP. Now tolerating short periods of trach collar.  Pt developed barotrauma and had open chest wound where previous pigtail catheter was placed. CTsx reconsulted. He will need to follow up with Dr. Rodrigue Caldera in 4 weeks to evaluate for OR.

## 2017-12-09 NOTE — DISCHARGE NOTE ADULT - CONDITIONS AT DISCHARGE
stable, axox4, no c/o pain. anxious at times. pt weaned daily from vent AC to cpap and/or trach collar 40%  OOB to chair daily

## 2017-12-09 NOTE — PROGRESS NOTE ADULT - ATTENDING COMMENTS
I have seen and examined the patient. I agree with the above history, physical exam, and plan of care except for as detailed below.    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 Remains mechanically ventilated, unable to wean due to high peak pressures. Patient with significant vent dyssynchrony - unable to trigger. Decreased flow trigger. Possible auto-PEEP? Decreased TV as well. Continue to monitor. Prior hypotension resolved. Now with metabolic alkalosis. Trial of diamox. Continue voriconazole.

## 2017-12-09 NOTE — DISCHARGE NOTE ADULT - MEDICATION SUMMARY - MEDICATIONS TO CHANGE
I will SWITCH the dose or number of times a day I take the medications listed below when I get home from the hospital:    oxyCODONE 10 mg oral tablet  -- 1 tab(s) by mouth every 4 hours, As needed, Severe Pain (7 - 10)    ferrous sulfate 325 mg (65 mg elemental iron) oral tablet  -- 1  by mouth 3 times a day    pantoprazole 40 mg oral delayed release tablet  -- 1 tab(s) by mouth 2 times a day (before meals)

## 2017-12-09 NOTE — DISCHARGE NOTE ADULT - CARE PLAN
Principal Discharge DX:	Hemoptysis  Goal:	Resolved  Instructions for follow-up, activity and diet:	s/p  DAVID resection 9/2017  s/p IR embolization of left bronchial artery on 11/16/2017  Secondary Diagnosis:	Sarcoidosis  Goal:	Maintain optimal resp status  Secondary Diagnosis:	Acute on chronic respiratory failure, unspecified whether with hypoxia or hypercapnia  Instructions for follow-up, activity and diet:	Trach------- Principal Discharge DX:	Hemoptysis  Goal:	Resolved  Instructions for follow-up, activity and diet:	s/p  DAVID resection 9/2017  s/p IR embolization of left bronchial artery on 11/16/2017  Continue to monitor for any blood in sputum.  Secondary Diagnosis:	Sarcoidosis  Goal:	Maintain optimal resp status  Instructions for follow-up, activity and diet:	Continue medications as directed.  Supplemental oxygen as needed.  Secondary Diagnosis:	Acute on chronic respiratory failure, unspecified whether with hypoxia or hypercapnia  Instructions for follow-up, activity and diet:	You have a tracheostomy. Settings 12/450/5/40%  Continue settings, wean as tolerated.  Daily trach care and suctioning as needed.  Secondary Diagnosis:	Clostridium difficile diarrhea  Secondary Diagnosis:	Functional quadriplegia  Instructions for follow-up, activity and diet:	Supportive care, continue PT/OT as tolerated.  Secondary Diagnosis:	Hypertension Principal Discharge DX:	Hemoptysis  Goal:	Resolved  Instructions for follow-up, activity and diet:	s/p  DAVID resection 9/2017  s/p IR embolization of left bronchial artery on 11/16/2017  Continue to monitor for any blood in sputum.  Secondary Diagnosis:	Sarcoidosis  Goal:	Maintain optimal resp status  Instructions for follow-up, activity and diet:	Continue medications as directed.  Supplemental oxygen as needed.  Secondary Diagnosis:	Acute on chronic respiratory failure, unspecified whether with hypoxia or hypercapnia  Instructions for follow-up, activity and diet:	You have a tracheostomy. Settings 12/450/5/40%  Continue settings, wean as tolerated.  Daily trach care and suctioning as needed.  Secondary Diagnosis:	Clostridium difficile diarrhea  Secondary Diagnosis:	Functional quadriplegia  Goal:	improving  Instructions for follow-up, activity and diet:	Supportive care, continue PT/OT as tolerated.  Secondary Diagnosis:	Hypertension  Instructions for follow-up, activity and diet:	Continue medications as directed. Principal Discharge DX:	Hemoptysis  Goal:	Resolved  Instructions for follow-up, activity and diet:	s/p  DAVID resection 9/2017  s/p IR embolization of left bronchial artery on 11/16/2017  Continue to monitor for any blood in sputum.  Secondary Diagnosis:	Sarcoidosis  Goal:	Maintain optimal resp status  Instructions for follow-up, activity and diet:	Continue medications as directed.  Supplemental oxygen as needed.  Secondary Diagnosis:	Acute on chronic respiratory failure, unspecified whether with hypoxia or hypercapnia  Instructions for follow-up, activity and diet:	You have a tracheostomy. Settings 12/450/5/40%  Continue settings, wean as tolerated.  Daily trach care and suctioning as needed.  Secondary Diagnosis:	Clostridium difficile diarrhea  Goal:	resolved  Instructions for follow-up, activity and diet:	You completed antibiotics for diarrhea.  Monitor for any further loose stools or diarrhea.  Secondary Diagnosis:	Functional quadriplegia  Goal:	improving  Instructions for follow-up, activity and diet:	Supportive care, continue PT/OT as tolerated.  Secondary Diagnosis:	Hypertension  Instructions for follow-up, activity and diet:	Continue medications as directed. Principal Discharge DX:	Hemoptysis  Goal:	Resolved  Instructions for follow-up, activity and diet:	s/p  DAVID resection 9/2017  s/p IR embolization of left bronchial artery on 11/16/2017  Continue to monitor for any blood in sputum.  Secondary Diagnosis:	Sarcoidosis  Goal:	Maintain optimal resp status  Instructions for follow-up, activity and diet:	Continue medications as directed.  Supplemental oxygen as needed.  Secondary Diagnosis:	Acute on chronic respiratory failure, unspecified whether with hypoxia or hypercapnia  Instructions for follow-up, activity and diet:	You have a tracheostomy. Settings 12/450/5/40%, CPAP, Tolerating short periods of trach collar  Continue settings, wean as tolerated.  Daily trach care and suctioning as needed.  Secondary Diagnosis:	Clostridium difficile diarrhea  Goal:	resolved  Instructions for follow-up, activity and diet:	You completed antibiotics for diarrhea.  Monitor for any further loose stools or diarrhea.  Secondary Diagnosis:	Hypertension  Goal:	improving  Instructions for follow-up, activity and diet:	Supportive care, continue PT/OT as tolerated.  Secondary Diagnosis:	Bronchopleural fistula  Instructions for follow-up, activity and diet:	Ostomy bag to side bed drainage. Please expel air once per shift, to avoid pressure build up.  Follow up with Dr. Caldera in 4 weeks.

## 2017-12-09 NOTE — DISCHARGE NOTE ADULT - INSTRUCTIONS
PEG with Jevity 1.2 @ 40cc/hr over 24 hours/day via PEG PEG with Jevity 1.2 @ 45cc/hr over 24 hours/day via PEG under trach: cleanse with NS, pat dry, LBF to periwound, foam BID   left chest wound: cleanse with normal saline, LBF to periwound, place ostomy bag over wound and drain air as needed  BL buttock: barrier cream as needed

## 2017-12-09 NOTE — PROGRESS NOTE ADULT - SUBJECTIVE AND OBJECTIVE BOX
CHIEF COMPLAINT:    Interval Events:    REVIEW OF SYSTEMS:  Constitutional:   Eyes:  ENT:  CV:  Resp:  GI:  :  MSK:  Integumentary:  Neurological:  Psychiatric:  Endocrine:  Hematologic/Lymphatic:  Allergic/Immunologic:  [ ] All other systems negative  [ ] Unable to assess ROS because ________    OBJECTIVE:  ICU Vital Signs Last 24 Hrs  T(C): 36.9 (09 Dec 2017 06:06), Max: 37.7 (08 Dec 2017 12:57)  T(F): 98.5 (09 Dec 2017 06:06), Max: 99.8 (08 Dec 2017 12:57)  HR: 102 (09 Dec 2017 06:53) (96 - 113)  BP: 135/81 (09 Dec 2017 06:06) (110/69 - 139/87)  BP(mean): --  ABP: --  ABP(mean): --  RR: 12 (09 Dec 2017 06:06) (12 - 18)  SpO2: 100% (09 Dec 2017 06:53) (98% - 100%)    Mode: AC/ CMV (Assist Control/ Continuous Mandatory Ventilation), RR (machine): 12, TV (machine): 450, FiO2: 40, PEEP: 5, ITime: 1, MAP: 12, PC: 35, PIP: 37    12-08 @ 07:01  -  12-09 @ 07:00  --------------------------------------------------------  IN: 0 mL / OUT: 200 mL / NET: -200 mL      CAPILLARY BLOOD GLUCOSE      POCT Blood Glucose.: 130 mg/dL (09 Dec 2017 05:50)      PHYSICAL EXAM:  General:   HEENT:   Lymph Nodes:  Neck:   Respiratory:   Cardiovascular:   Abdomen:   Extremities:   Skin:   Neurological:  Psychiatry:    HOSPITAL MEDICATIONS:  MEDICATIONS  (STANDING):  acetazolamide Injectable 500 milliGRAM(s) IV Push once  ALBUTerol    90 MICROgram(s) HFA Inhaler 4 Puff(s) Inhalation every 4 hours  buDESOnide 160 MICROgram(s)/formoterol 4.5 MICROgram(s) Inhaler 2 Puff(s) Inhalation two times a day  chlorhexidine 0.12% Liquid 15 milliLiter(s) Swish and Spit two times a day  chlorhexidine 4% Liquid 1 Application(s) Topical daily  enoxaparin Injectable 40 milliGRAM(s) SubCutaneous daily  fentaNYL   Patch  50 MICROgram(s)/Hr 1 Patch Transdermal every 72 hours  ferrous    sulfate Liquid 300 milliGRAM(s) Enteral Tube daily  folic acid 1 milliGRAM(s) Oral daily  insulin lispro (HumaLOG) corrective regimen sliding scale   SubCutaneous every 6 hours  ipratropium 17 MICROgram(s) HFA Inhaler 1 Puff(s) Inhalation every 6 hours  LORazepam   Injectable 2 milliGRAM(s) IV Push every 6 hours  pantoprazole  Injectable 40 milliGRAM(s) IV Push daily  polyethylene glycol 3350 17 Gram(s) Oral at bedtime  potassium chloride   Powder 40 milliEquivalent(s) Oral every 4 hours  senna Syrup 10 milliLiter(s) Oral at bedtime  valproate sodium IVPB 500 milliGRAM(s) IV Intermittent every 12 hours  voriconazole IVPB 300 milliGRAM(s) IV Intermittent every 12 hours    MEDICATIONS  (PRN):  acetaminophen   Tablet 650 milliGRAM(s) Oral every 6 hours PRN For Temp greater than 38 C (100.4 F)      LABS:                        8.0    7.40  )-----------( 225      ( 09 Dec 2017 05:30 )             25.3     12-09    142  |  91<L>  |  10  ----------------------------<  135<H>  3.2<L>   |  44<H>  |  0.67    Ca    8.4      09 Dec 2017 05:30  Phos  4.3     12-08  Mg     1.9     12-08    TPro  5.7<L>  /  Alb  2.4<L>  /  TBili  0.2  /  DBili  x   /  AST  19  /  ALT  7   /  AlkPhos  84  12-08    PT/INR - ( 08 Dec 2017 01:15 )   PT: 14.8 SEC;   INR: 1.28              Arterial Blood Gas:  12-08 @ 22:09  7.50/60/169/45/99.7/21.4  ABG lactate: --  Arterial Blood Gas:  12-08 @ 14:53  7.57/50/150/44/99.6/21.0  ABG lactate: --  Arterial Blood Gas:  12-08 @ 08:40  7.47/65/137/44/99/21.0  ABG lactate: --  Arterial Blood Gas:  12-08 @ 06:00  7.47/64/38/44/73.9/20.8  ABG lactate: 1.6  Arterial Blood Gas:  12-08 @ 03:00  7.30/98/107/42/98.0/19.0  ABG lactate: 0.6        MICROBIOLOGY:     RADIOLOGY:  [ ] Reviewed and interpreted by me    PULMONARY FUNCTION TESTS:    EKG: CHIEF COMPLAINT: Patient is a 47y old  Male who presents with a chief complaint of hemoptysis (09 Nov 2017 00:32)    Interval Events: No acute events overnight    REVIEW OF SYSTEMS:  Constitutional: Denies pain, denies anxiety  Eyes:  ENT:  CV: Denies CP  Resp: Denies SOB  GI:  :  MSK:  Integumentary:  Neurological:  Psychiatric:  Endocrine:  Hematologic/Lymphatic:  Allergic/Immunologic:  [x] All other systems negative  [ ] Unable to assess ROS because ________    OBJECTIVE:  ICU Vital Signs Last 24 Hrs  T(C): 36.9 (09 Dec 2017 06:06), Max: 37.7 (08 Dec 2017 12:57)  T(F): 98.5 (09 Dec 2017 06:06), Max: 99.8 (08 Dec 2017 12:57)  HR: 102 (09 Dec 2017 06:53) (96 - 113)  BP: 135/81 (09 Dec 2017 06:06) (110/69 - 139/87)  BP(mean): --  ABP: --  ABP(mean): --  RR: 12 (09 Dec 2017 06:06) (12 - 18)  SpO2: 100% (09 Dec 2017 06:53) (98% - 100%)    Mode: AC/ CMV (Assist Control/ Continuous Mandatory Ventilation), RR (machine): 12, TV (machine): 450, FiO2: 40, PEEP: 5, ITime: 1, MAP: 12, PC: 35, PIP: 37    12-08 @ 07:01  -  12-09 @ 07:00  --------------------------------------------------------  IN: 0 mL / OUT: 200 mL / NET: -200 mL      CAPILLARY BLOOD GLUCOSE      POCT Blood Glucose.: 130 mg/dL (09 Dec 2017 05:50)      HOSPITAL MEDICATIONS:  MEDICATIONS  (STANDING):  acetazolamide Injectable 500 milliGRAM(s) IV Push once  ALBUTerol    90 MICROgram(s) HFA Inhaler 4 Puff(s) Inhalation every 4 hours  buDESOnide 160 MICROgram(s)/formoterol 4.5 MICROgram(s) Inhaler 2 Puff(s) Inhalation two times a day  chlorhexidine 0.12% Liquid 15 milliLiter(s) Swish and Spit two times a day  chlorhexidine 4% Liquid 1 Application(s) Topical daily  enoxaparin Injectable 40 milliGRAM(s) SubCutaneous daily  fentaNYL   Patch  50 MICROgram(s)/Hr 1 Patch Transdermal every 72 hours  ferrous    sulfate Liquid 300 milliGRAM(s) Enteral Tube daily  folic acid 1 milliGRAM(s) Oral daily  insulin lispro (HumaLOG) corrective regimen sliding scale   SubCutaneous every 6 hours  ipratropium 17 MICROgram(s) HFA Inhaler 1 Puff(s) Inhalation every 6 hours  LORazepam   Injectable 2 milliGRAM(s) IV Push every 6 hours  pantoprazole  Injectable 40 milliGRAM(s) IV Push daily  polyethylene glycol 3350 17 Gram(s) Oral at bedtime  potassium chloride   Powder 40 milliEquivalent(s) Oral every 4 hours  senna Syrup 10 milliLiter(s) Oral at bedtime  valproate sodium IVPB 500 milliGRAM(s) IV Intermittent every 12 hours  voriconazole IVPB 300 milliGRAM(s) IV Intermittent every 12 hours    MEDICATIONS  (PRN):  acetaminophen   Tablet 650 milliGRAM(s) Oral every 6 hours PRN For Temp greater than 38 C (100.4 F)      LABS:                        8.0    7.40  )-----------( 225      ( 09 Dec 2017 05:30 )             25.3     12-09    142  |  91<L>  |  10  ----------------------------<  135<H>  3.2<L>   |  44<H>  |  0.67    Ca    8.4      09 Dec 2017 05:30  Phos  4.3     12-08  Mg     1.9     12-08    TPro  5.7<L>  /  Alb  2.4<L>  /  TBili  0.2  /  DBili  x   /  AST  19  /  ALT  7   /  AlkPhos  84  12-08    PT/INR - ( 08 Dec 2017 01:15 )   PT: 14.8 SEC;   INR: 1.28              Arterial Blood Gas:  12-08 @ 22:09  7.50/60/169/45/99.7/21.4  ABG lactate: --  Arterial Blood Gas:  12-08 @ 14:53  7.57/50/150/44/99.6/21.0  ABG lactate: --  Arterial Blood Gas:  12-08 @ 08:40  7.47/65/137/44/99/21.0  ABG lactate: --  Arterial Blood Gas:  12-08 @ 06:00  7.47/64/38/44/73.9/20.8  ABG lactate: 1.6  Arterial Blood Gas:  12-08 @ 03:00  7.30/98/107/42/98.0/19.0  ABG lactate: 0.6        MICROBIOLOGY:     RADIOLOGY:  [ ] Reviewed and interpreted by me    PULMONARY FUNCTION TESTS:    EKG:

## 2017-12-09 NOTE — DISCHARGE NOTE ADULT - PLAN OF CARE
Resolved s/p  DAVID resection 9/2017  s/p IR embolization of left bronchial artery on 11/16/2017 Maintain optimal resp status Trach------- s/p  DAVID resection 9/2017  s/p IR embolization of left bronchial artery on 11/16/2017  Continue to monitor for any blood in sputum. Continue medications as directed.  Supplemental oxygen as needed. You have a tracheostomy. Settings 12/450/5/40%  Continue settings, wean as tolerated.  Daily trach care and suctioning as needed. Supportive care, continue PT/OT as tolerated. improving Continue medications as directed. resolved You completed antibiotics for diarrhea.  Monitor for any further loose stools or diarrhea. You have a tracheostomy. Settings 12/450/5/40%, CPAP, Tolerating short periods of trach collar  Continue settings, wean as tolerated.  Daily trach care and suctioning as needed. Ostomy bag to side bed drainage. Please expel air once per shift, to avoid pressure build up.  Follow up with Dr. Caldera in 4 weeks.

## 2017-12-10 DIAGNOSIS — A04.72 ENTEROCOLITIS DUE TO CLOSTRIDIUM DIFFICILE, NOT SPECIFIED AS RECURRENT: ICD-10-CM

## 2017-12-10 LAB
APPEARANCE UR: SIGNIFICANT CHANGE UP
BACTERIA BLD CULT: SIGNIFICANT CHANGE UP
BILIRUB UR-MCNC: NEGATIVE — SIGNIFICANT CHANGE UP
BLOOD UR QL VISUAL: NEGATIVE — SIGNIFICANT CHANGE UP
BUN SERPL-MCNC: 9 MG/DL — SIGNIFICANT CHANGE UP (ref 7–23)
C DIFF TOX GENS STL QL NAA+PROBE: DETECTED — HIGH
CALCIUM SERPL-MCNC: 8.3 MG/DL — LOW (ref 8.4–10.5)
CHLORIDE SERPL-SCNC: 97 MMOL/L — LOW (ref 98–107)
CO2 SERPL-SCNC: 34 MMOL/L — HIGH (ref 22–31)
COLOR SPEC: YELLOW — SIGNIFICANT CHANGE UP
CREAT SERPL-MCNC: 0.67 MG/DL — SIGNIFICANT CHANGE UP (ref 0.5–1.3)
GLUCOSE BLDC GLUCOMTR-MCNC: 112 MG/DL — HIGH (ref 70–99)
GLUCOSE BLDC GLUCOMTR-MCNC: 94 MG/DL — SIGNIFICANT CHANGE UP (ref 70–99)
GLUCOSE BLDC GLUCOMTR-MCNC: 95 MG/DL — SIGNIFICANT CHANGE UP (ref 70–99)
GLUCOSE BLDC GLUCOMTR-MCNC: 97 MG/DL — SIGNIFICANT CHANGE UP (ref 70–99)
GLUCOSE SERPL-MCNC: 106 MG/DL — HIGH (ref 70–99)
GLUCOSE UR-MCNC: NEGATIVE — SIGNIFICANT CHANGE UP
KETONES UR-MCNC: NEGATIVE — SIGNIFICANT CHANGE UP
LEUKOCYTE ESTERASE UR-ACNC: NEGATIVE — SIGNIFICANT CHANGE UP
NITRITE UR-MCNC: NEGATIVE — SIGNIFICANT CHANGE UP
PH UR: 7.5 — SIGNIFICANT CHANGE UP (ref 5–8)
POTASSIUM SERPL-MCNC: 4.1 MMOL/L — SIGNIFICANT CHANGE UP (ref 3.5–5.3)
POTASSIUM SERPL-SCNC: 4.1 MMOL/L — SIGNIFICANT CHANGE UP (ref 3.5–5.3)
PROT UR-MCNC: 100 MG/DL — SIGNIFICANT CHANGE UP
RBC CASTS # UR COMP ASSIST: SIGNIFICANT CHANGE UP (ref 0–?)
SODIUM SERPL-SCNC: 138 MMOL/L — SIGNIFICANT CHANGE UP (ref 135–145)
SP GR SPEC: 1.01 — SIGNIFICANT CHANGE UP (ref 1–1.04)
SQUAMOUS # UR AUTO: SIGNIFICANT CHANGE UP
UROBILINOGEN FLD QL: NORMAL MG/DL — SIGNIFICANT CHANGE UP
WBC UR QL: SIGNIFICANT CHANGE UP (ref 0–?)

## 2017-12-10 PROCEDURE — 99233 SBSQ HOSP IP/OBS HIGH 50: CPT

## 2017-12-10 RX ORDER — ACETAZOLAMIDE 250 MG/1
500 TABLET ORAL ONCE
Qty: 0 | Refills: 0 | Status: COMPLETED | OUTPATIENT
Start: 2017-12-10 | End: 2017-12-10

## 2017-12-10 RX ORDER — ACETAMINOPHEN 500 MG
650 TABLET ORAL EVERY 6 HOURS
Qty: 0 | Refills: 0 | Status: DISCONTINUED | OUTPATIENT
Start: 2017-12-10 | End: 2018-01-10

## 2017-12-10 RX ORDER — VANCOMYCIN HCL 1 G
125 VIAL (EA) INTRAVENOUS EVERY 6 HOURS
Qty: 0 | Refills: 0 | Status: DISCONTINUED | OUTPATIENT
Start: 2017-12-10 | End: 2017-12-19

## 2017-12-10 RX ADMIN — ALBUTEROL 4 PUFF(S): 90 AEROSOL, METERED ORAL at 04:01

## 2017-12-10 RX ADMIN — Medication 650 MILLIGRAM(S): at 16:37

## 2017-12-10 RX ADMIN — Medication 1 PUFF(S): at 10:23

## 2017-12-10 RX ADMIN — CHLORHEXIDINE GLUCONATE 1 APPLICATION(S): 213 SOLUTION TOPICAL at 12:26

## 2017-12-10 RX ADMIN — Medication 27.5 MILLIGRAM(S): at 06:08

## 2017-12-10 RX ADMIN — Medication 125 MILLIGRAM(S): at 18:46

## 2017-12-10 RX ADMIN — Medication 1 PUFF(S): at 04:01

## 2017-12-10 RX ADMIN — BUDESONIDE AND FORMOTEROL FUMARATE DIHYDRATE 2 PUFF(S): 160; 4.5 AEROSOL RESPIRATORY (INHALATION) at 21:01

## 2017-12-10 RX ADMIN — PANTOPRAZOLE SODIUM 40 MILLIGRAM(S): 20 TABLET, DELAYED RELEASE ORAL at 12:27

## 2017-12-10 RX ADMIN — Medication 1 PUFF(S): at 16:43

## 2017-12-10 RX ADMIN — Medication 2 MILLIGRAM(S): at 06:07

## 2017-12-10 RX ADMIN — ALBUTEROL 4 PUFF(S): 90 AEROSOL, METERED ORAL at 00:58

## 2017-12-10 RX ADMIN — ENOXAPARIN SODIUM 40 MILLIGRAM(S): 100 INJECTION SUBCUTANEOUS at 12:26

## 2017-12-10 RX ADMIN — Medication 0.5 MILLIGRAM(S): at 21:27

## 2017-12-10 RX ADMIN — Medication 1 MILLIGRAM(S): at 12:28

## 2017-12-10 RX ADMIN — ACETAZOLAMIDE 500 MILLIGRAM(S): 250 TABLET ORAL at 12:41

## 2017-12-10 RX ADMIN — ALBUTEROL 4 PUFF(S): 90 AEROSOL, METERED ORAL at 13:44

## 2017-12-10 RX ADMIN — Medication 1 PUFF(S): at 21:01

## 2017-12-10 RX ADMIN — BUDESONIDE AND FORMOTEROL FUMARATE DIHYDRATE 2 PUFF(S): 160; 4.5 AEROSOL RESPIRATORY (INHALATION) at 10:22

## 2017-12-10 RX ADMIN — Medication 27.5 MILLIGRAM(S): at 18:05

## 2017-12-10 RX ADMIN — ALBUTEROL 4 PUFF(S): 90 AEROSOL, METERED ORAL at 21:00

## 2017-12-10 RX ADMIN — Medication 300 MILLIGRAM(S): at 12:26

## 2017-12-10 RX ADMIN — Medication 650 MILLIGRAM(S): at 00:08

## 2017-12-10 RX ADMIN — VORICONAZOLE 125 MILLIGRAM(S): 10 INJECTION, POWDER, LYOPHILIZED, FOR SOLUTION INTRAVENOUS at 12:25

## 2017-12-10 RX ADMIN — ALBUTEROL 4 PUFF(S): 90 AEROSOL, METERED ORAL at 10:22

## 2017-12-10 RX ADMIN — Medication 125 MILLIGRAM(S): at 23:32

## 2017-12-10 RX ADMIN — VORICONAZOLE 125 MILLIGRAM(S): 10 INJECTION, POWDER, LYOPHILIZED, FOR SOLUTION INTRAVENOUS at 23:31

## 2017-12-10 RX ADMIN — ALBUTEROL 4 PUFF(S): 90 AEROSOL, METERED ORAL at 16:44

## 2017-12-10 RX ADMIN — Medication 650 MILLIGRAM(S): at 17:07

## 2017-12-10 RX ADMIN — VORICONAZOLE 125 MILLIGRAM(S): 10 INJECTION, POWDER, LYOPHILIZED, FOR SOLUTION INTRAVENOUS at 00:00

## 2017-12-10 NOTE — PROGRESS NOTE ADULT - PROBLEM SELECTOR PLAN 7
- Vanco via PEG   - contact precautions   - Monitor diarrhea if worsens consider flexiseal   Follow bun/'cr

## 2017-12-10 NOTE — PROGRESS NOTE ADULT - PROBLEM SELECTOR PLAN 2
-Trach to vent.  -ABG. Vent settings adjusted prn.  -Diamox for metabolic alkalosis and edema  - continue to eval for weaning

## 2017-12-10 NOTE — PROGRESS NOTE ADULT - SUBJECTIVE AND OBJECTIVE BOX
CHIEF COMPLAINT:    Interval Events:    REVIEW OF SYSTEMS:  Constitutional:   Eyes:  ENT:  CV:  Resp:  GI:  :  MSK:  Integumentary:  Neurological:  Psychiatric:  Endocrine:  Hematologic/Lymphatic:  Allergic/Immunologic:  [ ] All other systems negative  [ ] Unable to assess ROS because ________    OBJECTIVE:  ICU Vital Signs Last 24 Hrs  T(C): 37.4 (10 Dec 2017 06:04), Max: 38 (09 Dec 2017 23:10)  T(F): 99.3 (10 Dec 2017 06:04), Max: 100.4 (09 Dec 2017 23:10)  HR: 106 (10 Dec 2017 06:41) (100 - 118)  BP: 127/76 (10 Dec 2017 06:04) (114/79 - 128/85)  BP(mean): --  ABP: --  ABP(mean): --  RR: 20 (10 Dec 2017 06:04) (15 - 20)  SpO2: 99% (10 Dec 2017 06:41) (97% - 100%)    Mode: AC/ CMV (Assist Control/ Continuous Mandatory Ventilation), RR (machine): 12, TV (machine): 400, FiO2: 40, PEEP: 5, MAP: 11, PIP: 32    12-09 @ 07:01  -  12-10 @ 07:00  --------------------------------------------------------  IN: 0 mL / OUT: 600 mL / NET: -600 mL      CAPILLARY BLOOD GLUCOSE      POCT Blood Glucose.: 94 mg/dL (10 Dec 2017 06:20)      PHYSICAL EXAM:  General:   HEENT:   Lymph Nodes:  Neck:   Respiratory:   Cardiovascular:   Abdomen:   Extremities:   Skin:   Neurological:  Psychiatry:    HOSPITAL MEDICATIONS:  MEDICATIONS  (STANDING):  ALBUTerol    90 MICROgram(s) HFA Inhaler 4 Puff(s) Inhalation every 4 hours  buDESOnide 160 MICROgram(s)/formoterol 4.5 MICROgram(s) Inhaler 2 Puff(s) Inhalation two times a day  chlorhexidine 4% Liquid 1 Application(s) Topical daily  enoxaparin Injectable 40 milliGRAM(s) SubCutaneous daily  fentaNYL   Patch  50 MICROgram(s)/Hr 1 Patch Transdermal every 72 hours  ferrous    sulfate Liquid 300 milliGRAM(s) Enteral Tube daily  folic acid 1 milliGRAM(s) Oral daily  insulin lispro (HumaLOG) corrective regimen sliding scale   SubCutaneous every 6 hours  ipratropium 17 MICROgram(s) HFA Inhaler 1 Puff(s) Inhalation every 6 hours  LORazepam   Injectable 2 milliGRAM(s) IV Push every 8 hours  pantoprazole  Injectable 40 milliGRAM(s) IV Push daily  polyethylene glycol 3350 17 Gram(s) Oral at bedtime  senna Syrup 10 milliLiter(s) Oral at bedtime  valproate sodium IVPB 500 milliGRAM(s) IV Intermittent every 12 hours  voriconazole IVPB 300 milliGRAM(s) IV Intermittent every 12 hours    MEDICATIONS  (PRN):  acetaminophen   Tablet 650 milliGRAM(s) Oral every 6 hours PRN For Temp greater than 38 C (100.4 F)      LABS:                        8.0    7.40  )-----------( 225      ( 09 Dec 2017 05:30 )             25.3     12-09    142  |  91<L>  |  10  ----------------------------<  135<H>  3.2<L>   |  44<H>  |  0.67    Ca    8.4      09 Dec 2017 05:30          Arterial Blood Gas:  12-08 @ 22:09  7.50/60/169/45/99.7/21.4  ABG lactate: --  Arterial Blood Gas:  12-08 @ 14:53  7.57/50/150/44/99.6/21.0  ABG lactate: --  Arterial Blood Gas:  12-08 @ 08:40  7.47/65/137/44/99/21.0  ABG lactate: --        MICROBIOLOGY:     RADIOLOGY:  [ ] Reviewed and interpreted by me    PULMONARY FUNCTION TESTS:    EKG: CHIEF COMPLAINT:  Patient is a 47y old  Male who presents with a chief complaint of hemoptysis (09 Nov 2017 00:32) (09 Dec 2017 16:29)      Interval Events:    REVIEW OF SYSTEMS:  Constitutional: No fever or chills,   CV: No chest pain   Resp: No sob  GI:  : mild supra pubic pain   [x ] All other systems negative  [ ] Unable to assess ROS because ________    OBJECTIVE:  ICU Vital Signs Last 24 Hrs  T(C): 37.4 (10 Dec 2017 06:04), Max: 38 (09 Dec 2017 23:10)  T(F): 99.3 (10 Dec 2017 06:04), Max: 100.4 (09 Dec 2017 23:10)  HR: 106 (10 Dec 2017 06:41) (100 - 118)  BP: 127/76 (10 Dec 2017 06:04) (114/79 - 128/85)  BP(mean): --  ABP: --  ABP(mean): --  RR: 20 (10 Dec 2017 06:04) (15 - 20)  SpO2: 99% (10 Dec 2017 06:41) (97% - 100%)    Mode: AC/ CMV (Assist Control/ Continuous Mandatory Ventilation), RR (machine): 12, TV (machine): 400, FiO2: 40, PEEP: 5, MAP: 11, PIP: 32    12-09 @ 07:01  -  12-10 @ 07:00  --------------------------------------------------------  IN: 0 mL / OUT: 600 mL / NET: -600 mL      CAPILLARY BLOOD GLUCOSE      POCT Blood Glucose.: 94 mg/dL (10 Dec 2017 06:20)    HOSPITAL MEDICATIONS:  MEDICATIONS  (STANDING):  ALBUTerol    90 MICROgram(s) HFA Inhaler 4 Puff(s) Inhalation every 4 hours  buDESOnide 160 MICROgram(s)/formoterol 4.5 MICROgram(s) Inhaler 2 Puff(s) Inhalation two times a day  chlorhexidine 4% Liquid 1 Application(s) Topical daily  enoxaparin Injectable 40 milliGRAM(s) SubCutaneous daily  fentaNYL   Patch  50 MICROgram(s)/Hr 1 Patch Transdermal every 72 hours  ferrous    sulfate Liquid 300 milliGRAM(s) Enteral Tube daily  folic acid 1 milliGRAM(s) Oral daily  insulin lispro (HumaLOG) corrective regimen sliding scale   SubCutaneous every 6 hours  ipratropium 17 MICROgram(s) HFA Inhaler 1 Puff(s) Inhalation every 6 hours  LORazepam   Injectable 2 milliGRAM(s) IV Push every 8 hours  pantoprazole  Injectable 40 milliGRAM(s) IV Push daily  polyethylene glycol 3350 17 Gram(s) Oral at bedtime  senna Syrup 10 milliLiter(s) Oral at bedtime  valproate sodium IVPB 500 milliGRAM(s) IV Intermittent every 12 hours  voriconazole IVPB 300 milliGRAM(s) IV Intermittent every 12 hours    MEDICATIONS  (PRN):  acetaminophen   Tablet 650 milliGRAM(s) Oral every 6 hours PRN For Temp greater than 38 C (100.4 F)      LABS:                        8.0    7.40  )-----------( 225      ( 09 Dec 2017 05:30 )             25.3     12-09    142  |  91<L>  |  10  ----------------------------<  135<H>  3.2<L>   |  44<H>  |  0.67    Ca    8.4      09 Dec 2017 05:30          Arterial Blood Gas:  12-08 @ 22:09  7.50/60/169/45/99.7/21.4  ABG lactate: --  Arterial Blood Gas:  12-08 @ 14:53  7.57/50/150/44/99.6/21.0  ABG lactate: --  Arterial Blood Gas:  12-08 @ 08:40  7.47/65/137/44/99/21.0  ABG lactate: --        MICROBIOLOGY:     RADIOLOGY:  [ ] Reviewed and interpreted by me    PULMONARY FUNCTION TESTS:    EKG:

## 2017-12-10 NOTE — PROGRESS NOTE ADULT - ATTENDING COMMENTS
I have seen and examined the patient. I agree with the above history, physical exam, and plan of care except for as detailed below.    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 Remains mechanically ventilated, unable to wean due to high peak pressures. Patient with significant vent dyssynchrony - continues to have difficulty triggering. Minimal improvement with adjustments yesterday. Prior hypotension resolved. Now with metabolic alkalosis. Continue diamox today, repeat chem and ABG in AM to see if patient needs continued diamox. Continue voriconazole. Borderline fever yesterday, continue to monitor, no abx at this time as patient is otherwise clinically unchanged.

## 2017-12-11 LAB
BASE EXCESS BLDA CALC-SCNC: 9.5 MMOL/L — SIGNIFICANT CHANGE UP
BASOPHILS # BLD AUTO: 0.01 K/UL — SIGNIFICANT CHANGE UP (ref 0–0.2)
BASOPHILS NFR BLD AUTO: 0.1 % — SIGNIFICANT CHANGE UP (ref 0–2)
BUN SERPL-MCNC: 9 MG/DL — SIGNIFICANT CHANGE UP (ref 7–23)
CALCIUM SERPL-MCNC: 8.3 MG/DL — LOW (ref 8.4–10.5)
CHLORIDE SERPL-SCNC: 99 MMOL/L — SIGNIFICANT CHANGE UP (ref 98–107)
CO2 SERPL-SCNC: 34 MMOL/L — HIGH (ref 22–31)
CREAT SERPL-MCNC: 0.66 MG/DL — SIGNIFICANT CHANGE UP (ref 0.5–1.3)
EOSINOPHIL # BLD AUTO: 0.12 K/UL — SIGNIFICANT CHANGE UP (ref 0–0.5)
EOSINOPHIL NFR BLD AUTO: 0.9 % — SIGNIFICANT CHANGE UP (ref 0–6)
GLUCOSE BLDC GLUCOMTR-MCNC: 103 MG/DL — HIGH (ref 70–99)
GLUCOSE BLDC GLUCOMTR-MCNC: 106 MG/DL — HIGH (ref 70–99)
GLUCOSE BLDC GLUCOMTR-MCNC: 87 MG/DL — SIGNIFICANT CHANGE UP (ref 70–99)
GLUCOSE BLDC GLUCOMTR-MCNC: 88 MG/DL — SIGNIFICANT CHANGE UP (ref 70–99)
GLUCOSE SERPL-MCNC: 106 MG/DL — HIGH (ref 70–99)
HCO3 BLDA-SCNC: 33 MMOL/L — HIGH (ref 22–26)
HCT VFR BLD CALC: 25.9 % — LOW (ref 39–50)
HGB BLD-MCNC: 8.1 G/DL — LOW (ref 13–17)
IMM GRANULOCYTES # BLD AUTO: 0.14 # — SIGNIFICANT CHANGE UP
IMM GRANULOCYTES NFR BLD AUTO: 1.1 % — SIGNIFICANT CHANGE UP (ref 0–1.5)
LYMPHOCYTES # BLD AUTO: 1.76 K/UL — SIGNIFICANT CHANGE UP (ref 1–3.3)
LYMPHOCYTES # BLD AUTO: 13.4 % — SIGNIFICANT CHANGE UP (ref 13–44)
MAGNESIUM SERPL-MCNC: 2.1 MG/DL — SIGNIFICANT CHANGE UP (ref 1.6–2.6)
MCHC RBC-ENTMCNC: 30.6 PG — SIGNIFICANT CHANGE UP (ref 27–34)
MCHC RBC-ENTMCNC: 31.3 % — LOW (ref 32–36)
MCV RBC AUTO: 97.7 FL — SIGNIFICANT CHANGE UP (ref 80–100)
MONOCYTES # BLD AUTO: 1.07 K/UL — HIGH (ref 0–0.9)
MONOCYTES NFR BLD AUTO: 8.1 % — SIGNIFICANT CHANGE UP (ref 2–14)
NEUTROPHILS # BLD AUTO: 10.08 K/UL — HIGH (ref 1.8–7.4)
NEUTROPHILS NFR BLD AUTO: 76.4 % — SIGNIFICANT CHANGE UP (ref 43–77)
NRBC # FLD: 0 — SIGNIFICANT CHANGE UP
PCO2 BLDA: 60 MMHG — HIGH (ref 35–48)
PH BLDA: 7.39 PH — SIGNIFICANT CHANGE UP (ref 7.35–7.45)
PHOSPHATE SERPL-MCNC: 4 MG/DL — SIGNIFICANT CHANGE UP (ref 2.5–4.5)
PLATELET # BLD AUTO: 252 K/UL — SIGNIFICANT CHANGE UP (ref 150–400)
PMV BLD: 12.3 FL — SIGNIFICANT CHANGE UP (ref 7–13)
PO2 BLDA: 195 MMHG — HIGH (ref 83–108)
POTASSIUM SERPL-MCNC: 4.1 MMOL/L — SIGNIFICANT CHANGE UP (ref 3.5–5.3)
POTASSIUM SERPL-SCNC: 4.1 MMOL/L — SIGNIFICANT CHANGE UP (ref 3.5–5.3)
RBC # BLD: 2.65 M/UL — LOW (ref 4.2–5.8)
RBC # FLD: 15.6 % — HIGH (ref 10.3–14.5)
SAO2 % BLDA: 99.8 % — HIGH (ref 95–99)
SODIUM SERPL-SCNC: 139 MMOL/L — SIGNIFICANT CHANGE UP (ref 135–145)
SPECIMEN SOURCE: SIGNIFICANT CHANGE UP
WBC # BLD: 13.18 K/UL — HIGH (ref 3.8–10.5)
WBC # FLD AUTO: 13.18 K/UL — HIGH (ref 3.8–10.5)

## 2017-12-11 PROCEDURE — 99233 SBSQ HOSP IP/OBS HIGH 50: CPT | Mod: GC

## 2017-12-11 RX ORDER — FUROSEMIDE 40 MG
40 TABLET ORAL ONCE
Qty: 0 | Refills: 0 | Status: COMPLETED | OUTPATIENT
Start: 2017-12-11 | End: 2017-12-11

## 2017-12-11 RX ADMIN — Medication 1 PUFF(S): at 09:50

## 2017-12-11 RX ADMIN — BUDESONIDE AND FORMOTEROL FUMARATE DIHYDRATE 2 PUFF(S): 160; 4.5 AEROSOL RESPIRATORY (INHALATION) at 09:50

## 2017-12-11 RX ADMIN — Medication 0.5 MILLIGRAM(S): at 08:00

## 2017-12-11 RX ADMIN — ENOXAPARIN SODIUM 40 MILLIGRAM(S): 100 INJECTION SUBCUTANEOUS at 13:35

## 2017-12-11 RX ADMIN — Medication 125 MILLIGRAM(S): at 05:00

## 2017-12-11 RX ADMIN — CHLORHEXIDINE GLUCONATE 1 APPLICATION(S): 213 SOLUTION TOPICAL at 13:36

## 2017-12-11 RX ADMIN — ALBUTEROL 4 PUFF(S): 90 AEROSOL, METERED ORAL at 15:33

## 2017-12-11 RX ADMIN — Medication 0.5 MILLIGRAM(S): at 04:57

## 2017-12-11 RX ADMIN — Medication 1 PUFF(S): at 15:33

## 2017-12-11 RX ADMIN — ALBUTEROL 4 PUFF(S): 90 AEROSOL, METERED ORAL at 04:41

## 2017-12-11 RX ADMIN — ALBUTEROL 4 PUFF(S): 90 AEROSOL, METERED ORAL at 20:26

## 2017-12-11 RX ADMIN — ALBUTEROL 4 PUFF(S): 90 AEROSOL, METERED ORAL at 13:01

## 2017-12-11 RX ADMIN — Medication 40 MILLIGRAM(S): at 18:24

## 2017-12-11 RX ADMIN — Medication 1 PUFF(S): at 04:41

## 2017-12-11 RX ADMIN — Medication 1 MILLIGRAM(S): at 13:35

## 2017-12-11 RX ADMIN — Medication 125 MILLIGRAM(S): at 23:07

## 2017-12-11 RX ADMIN — ALBUTEROL 4 PUFF(S): 90 AEROSOL, METERED ORAL at 09:50

## 2017-12-11 RX ADMIN — BUDESONIDE AND FORMOTEROL FUMARATE DIHYDRATE 2 PUFF(S): 160; 4.5 AEROSOL RESPIRATORY (INHALATION) at 20:29

## 2017-12-11 RX ADMIN — Medication 0.5 MILLIGRAM(S): at 23:07

## 2017-12-11 RX ADMIN — Medication 125 MILLIGRAM(S): at 18:23

## 2017-12-11 RX ADMIN — Medication 27.5 MILLIGRAM(S): at 18:23

## 2017-12-11 RX ADMIN — Medication 27.5 MILLIGRAM(S): at 05:00

## 2017-12-11 RX ADMIN — VORICONAZOLE 125 MILLIGRAM(S): 10 INJECTION, POWDER, LYOPHILIZED, FOR SOLUTION INTRAVENOUS at 23:08

## 2017-12-11 RX ADMIN — Medication 300 MILLIGRAM(S): at 13:35

## 2017-12-11 RX ADMIN — Medication 1 PUFF(S): at 22:59

## 2017-12-11 RX ADMIN — PANTOPRAZOLE SODIUM 40 MILLIGRAM(S): 20 TABLET, DELAYED RELEASE ORAL at 13:35

## 2017-12-11 RX ADMIN — Medication 125 MILLIGRAM(S): at 13:35

## 2017-12-11 RX ADMIN — VORICONAZOLE 125 MILLIGRAM(S): 10 INJECTION, POWDER, LYOPHILIZED, FOR SOLUTION INTRAVENOUS at 13:36

## 2017-12-11 RX ADMIN — ALBUTEROL 4 PUFF(S): 90 AEROSOL, METERED ORAL at 00:07

## 2017-12-11 NOTE — PROGRESS NOTE ADULT - PROBLEM SELECTOR PLAN 2
-Trach to vent.  -ABG. Vent settings adjusted prn.  - Lasix given today   -Ativan decreased to 0.5 every 8 hours now hold for lethergy   - pt noted abd breathing - TV increased , given trial of weaning tires with increased HR, lasix given

## 2017-12-11 NOTE — PROGRESS NOTE ADULT - PROBLEM SELECTOR PLAN 1
On voriconazole~  ID following  Pt having loose stool, foul watery- c- diff +   po vanco started   flexiseal

## 2017-12-11 NOTE — PROGRESS NOTE ADULT - SUBJECTIVE AND OBJECTIVE BOX
CHIEF COMPLAINT:    Interval Events:    REVIEW OF SYSTEMS:  Constitutional:   Eyes:  ENT:  CV:  Resp:  GI:  :  MSK:  Integumentary:  Neurological:  Psychiatric:  Endocrine:  Hematologic/Lymphatic:  Allergic/Immunologic:  [ ] All other systems negative  [ ] Unable to assess ROS because ________    OBJECTIVE:  ICU Vital Signs Last 24 Hrs  T(C): 36.9 (11 Dec 2017 05:00), Max: 37.2 (10 Dec 2017 13:02)  T(F): 98.4 (11 Dec 2017 05:00), Max: 99 (10 Dec 2017 13:02)  HR: 110 (11 Dec 2017 07:14) (94 - 110)  BP: 133/84 (11 Dec 2017 05:00) (125/81 - 141/92)  BP(mean): --  ABP: --  ABP(mean): --  RR: 16 (11 Dec 2017 05:00) (14 - 30)  SpO2: 100% (11 Dec 2017 07:14) (96% - 100%)    Mode: AC/ CMV (Assist Control/ Continuous Mandatory Ventilation), RR (machine): 12, TV (machine): 400, FiO2: 40, PEEP: 5, MAP: 11, PIP: 37    12-10 @ 07:01  -  12-11 @ 07:00  --------------------------------------------------------  IN: 600 mL / OUT: 1650 mL / NET: -1050 mL      CAPILLARY BLOOD GLUCOSE      POCT Blood Glucose.: 103 mg/dL (11 Dec 2017 06:22)      PHYSICAL EXAM:  General:   HEENT:   Lymph Nodes:  Neck:   Respiratory:   Cardiovascular:   Abdomen:   Extremities:   Skin:   Neurological:  Psychiatry:    HOSPITAL MEDICATIONS:  MEDICATIONS  (STANDING):  ALBUTerol    90 MICROgram(s) HFA Inhaler 4 Puff(s) Inhalation every 4 hours  buDESOnide 160 MICROgram(s)/formoterol 4.5 MICROgram(s) Inhaler 2 Puff(s) Inhalation two times a day  chlorhexidine 4% Liquid 1 Application(s) Topical daily  enoxaparin Injectable 40 milliGRAM(s) SubCutaneous daily  fentaNYL   Patch  50 MICROgram(s)/Hr 1 Patch Transdermal every 72 hours  ferrous    sulfate Liquid 300 milliGRAM(s) Enteral Tube daily  folic acid 1 milliGRAM(s) Oral daily  insulin lispro (HumaLOG) corrective regimen sliding scale   SubCutaneous every 6 hours  ipratropium 17 MICROgram(s) HFA Inhaler 1 Puff(s) Inhalation every 6 hours  LORazepam   Injectable 0.5 milliGRAM(s) IV Push every 6 hours  pantoprazole  Injectable 40 milliGRAM(s) IV Push daily  polyethylene glycol 3350 17 Gram(s) Oral at bedtime  senna Syrup 10 milliLiter(s) Oral at bedtime  valproate sodium IVPB 500 milliGRAM(s) IV Intermittent every 12 hours  vancomycin    Solution 125 milliGRAM(s) Enteral Tube every 6 hours  voriconazole IVPB 300 milliGRAM(s) IV Intermittent every 12 hours    MEDICATIONS  (PRN):  acetaminophen    Suspension. 650 milliGRAM(s) Enteral Tube every 6 hours PRN Mild Pain to moderate (1 - 6)  acetaminophen   Tablet 650 milliGRAM(s) Oral every 6 hours PRN For Temp greater than 38 C (100.4 F)      LABS:                        8.1    13.18 )-----------( 252      ( 11 Dec 2017 07:20 )             25.9     12-11    139  |  99  |  9   ----------------------------<  106<H>  4.1   |  34<H>  |  0.66    Ca    8.3<L>      11 Dec 2017 08:16  Phos  4.0     12-11  Mg     2.1     12-11        Urinalysis Basic - ( 10 Dec 2017 18:10 )    Color: YELLOW / Appearance: HAZY / S.010 / pH: 7.5  Gluc: NEGATIVE / Ketone: NEGATIVE  / Bili: NEGATIVE / Urobili: NORMAL mg/dL   Blood: NEGATIVE / Protein: 100 mg/dL / Nitrite: NEGATIVE   Leuk Esterase: NEGATIVE / RBC: 0-2 / WBC 0-2   Sq Epi: OCC / Non Sq Epi: x / Bacteria: x      Arterial Blood Gas:   @ 07:20  7.39/60/195/33/99.8/9.5  ABG lactate: --        MICROBIOLOGY:     RADIOLOGY:  [ ] Reviewed and interpreted by me    PULMONARY FUNCTION TESTS:    EKG: CHIEF COMPLAINT: Patient is a 47y old  Male who presents with a chief complaint of Patient is a 47y old  Male who presents with a chief complaint of hemoptysis (2017 00:32) (09 Dec 2017 16:29)      Interval Events: none    REVIEW OF SYSTEMS:  Constitutional: shakes head no to pain, chills   CV: no chest pain   Resp: sob with movement   GI: no n/v   [x ] All other systems negative  [ ] Unable to assess ROS because ________    OBJECTIVE:  ICU Vital Signs Last 24 Hrs  T(C): 36.9 (11 Dec 2017 05:00), Max: 37.2 (10 Dec 2017 13:02)  T(F): 98.4 (11 Dec 2017 05:00), Max: 99 (10 Dec 2017 13:02)  HR: 110 (11 Dec 2017 07:14) (94 - 110)  BP: 133/84 (11 Dec 2017 05:00) (125/81 - 141/92)  BP(mean): --  ABP: --  ABP(mean): --  RR: 16 (11 Dec 2017 05:00) (14 - 30)  SpO2: 100% (11 Dec 2017 07:14) (96% - 100%)    Mode: AC/ CMV (Assist Control/ Continuous Mandatory Ventilation), RR (machine): 12, TV (machine): 400, FiO2: 40, PEEP: 5, MAP: 11, PIP: 37    12-10 @ 07:01  -  12- @ 07:00  --------------------------------------------------------  IN: 600 mL / OUT: 1650 mL / NET: -1050 mL      CAPILLARY BLOOD GLUCOSE      POCT Blood Glucose.: 103 mg/dL (11 Dec 2017 06:22)      HOSPITAL MEDICATIONS:  MEDICATIONS  (STANDING):  ALBUTerol    90 MICROgram(s) HFA Inhaler 4 Puff(s) Inhalation every 4 hours  buDESOnide 160 MICROgram(s)/formoterol 4.5 MICROgram(s) Inhaler 2 Puff(s) Inhalation two times a day  chlorhexidine 4% Liquid 1 Application(s) Topical daily  enoxaparin Injectable 40 milliGRAM(s) SubCutaneous daily  fentaNYL   Patch  50 MICROgram(s)/Hr 1 Patch Transdermal every 72 hours  ferrous    sulfate Liquid 300 milliGRAM(s) Enteral Tube daily  folic acid 1 milliGRAM(s) Oral daily  insulin lispro (HumaLOG) corrective regimen sliding scale   SubCutaneous every 6 hours  ipratropium 17 MICROgram(s) HFA Inhaler 1 Puff(s) Inhalation every 6 hours  LORazepam   Injectable 0.5 milliGRAM(s) IV Push every 6 hours  pantoprazole  Injectable 40 milliGRAM(s) IV Push daily  polyethylene glycol 3350 17 Gram(s) Oral at bedtime  senna Syrup 10 milliLiter(s) Oral at bedtime  valproate sodium IVPB 500 milliGRAM(s) IV Intermittent every 12 hours  vancomycin    Solution 125 milliGRAM(s) Enteral Tube every 6 hours  voriconazole IVPB 300 milliGRAM(s) IV Intermittent every 12 hours    MEDICATIONS  (PRN):  acetaminophen    Suspension. 650 milliGRAM(s) Enteral Tube every 6 hours PRN Mild Pain to moderate (1 - 6)  acetaminophen   Tablet 650 milliGRAM(s) Oral every 6 hours PRN For Temp greater than 38 C (100.4 F)      LABS:                        8.1    13.18 )-----------( 252      ( 11 Dec 2017 07:20 )             25.9     12-11    139  |  99  |  9   ----------------------------<  106<H>  4.1   |  34<H>  |  0.66    Ca    8.3<L>      11 Dec 2017 08:16  Phos  4.0       Mg     2.1             Urinalysis Basic - ( 10 Dec 2017 18:10 )    Color: YELLOW / Appearance: HAZY / S.010 / pH: 7.5  Gluc: NEGATIVE / Ketone: NEGATIVE  / Bili: NEGATIVE / Urobili: NORMAL mg/dL   Blood: NEGATIVE / Protein: 100 mg/dL / Nitrite: NEGATIVE   Leuk Esterase: NEGATIVE / RBC: 0-2 / WBC 0-2   Sq Epi: OCC / Non Sq Epi: x / Bacteria: x      Arterial Blood Gas:   @ 07:20  7.39/60/195/33/99.8/9.5  ABG lactate: --        MICROBIOLOGY:     RADIOLOGY:  [ ] Reviewed and interpreted by me    PULMONARY FUNCTION TESTS:    EKG:

## 2017-12-12 LAB
ALBUMIN SERPL ELPH-MCNC: 2.6 G/DL — LOW (ref 3.3–5)
ALP SERPL-CCNC: 227 U/L — HIGH (ref 40–120)
ALT FLD-CCNC: 43 U/L — HIGH (ref 4–41)
AST SERPL-CCNC: 53 U/L — HIGH (ref 4–40)
BASOPHILS # BLD AUTO: 0.03 K/UL — SIGNIFICANT CHANGE UP (ref 0–0.2)
BASOPHILS NFR BLD AUTO: 0.3 % — SIGNIFICANT CHANGE UP (ref 0–2)
BILIRUB SERPL-MCNC: 0.4 MG/DL — SIGNIFICANT CHANGE UP (ref 0.2–1.2)
BUN SERPL-MCNC: 8 MG/DL — SIGNIFICANT CHANGE UP (ref 7–23)
CALCIUM SERPL-MCNC: 8.6 MG/DL — SIGNIFICANT CHANGE UP (ref 8.4–10.5)
CHLORIDE SERPL-SCNC: 102 MMOL/L — SIGNIFICANT CHANGE UP (ref 98–107)
CO2 SERPL-SCNC: 37 MMOL/L — HIGH (ref 22–31)
CREAT SERPL-MCNC: 0.6 MG/DL — SIGNIFICANT CHANGE UP (ref 0.5–1.3)
EOSINOPHIL # BLD AUTO: 0.17 K/UL — SIGNIFICANT CHANGE UP (ref 0–0.5)
EOSINOPHIL NFR BLD AUTO: 1.5 % — SIGNIFICANT CHANGE UP (ref 0–6)
GLUCOSE BLDC GLUCOMTR-MCNC: 110 MG/DL — HIGH (ref 70–99)
GLUCOSE BLDC GLUCOMTR-MCNC: 110 MG/DL — HIGH (ref 70–99)
GLUCOSE BLDC GLUCOMTR-MCNC: 119 MG/DL — HIGH (ref 70–99)
GLUCOSE BLDC GLUCOMTR-MCNC: 93 MG/DL — SIGNIFICANT CHANGE UP (ref 70–99)
GLUCOSE SERPL-MCNC: 107 MG/DL — HIGH (ref 70–99)
HCT VFR BLD CALC: 27.9 % — LOW (ref 39–50)
HGB BLD-MCNC: 8.5 G/DL — LOW (ref 13–17)
IMM GRANULOCYTES # BLD AUTO: 0.12 # — SIGNIFICANT CHANGE UP
IMM GRANULOCYTES NFR BLD AUTO: 1.1 % — SIGNIFICANT CHANGE UP (ref 0–1.5)
LYMPHOCYTES # BLD AUTO: 2.51 K/UL — SIGNIFICANT CHANGE UP (ref 1–3.3)
LYMPHOCYTES # BLD AUTO: 22.5 % — SIGNIFICANT CHANGE UP (ref 13–44)
MAGNESIUM SERPL-MCNC: 2.1 MG/DL — SIGNIFICANT CHANGE UP (ref 1.6–2.6)
MCHC RBC-ENTMCNC: 29.3 PG — SIGNIFICANT CHANGE UP (ref 27–34)
MCHC RBC-ENTMCNC: 30.5 % — LOW (ref 32–36)
MCV RBC AUTO: 96.2 FL — SIGNIFICANT CHANGE UP (ref 80–100)
MONOCYTES # BLD AUTO: 1.12 K/UL — HIGH (ref 0–0.9)
MONOCYTES NFR BLD AUTO: 10 % — SIGNIFICANT CHANGE UP (ref 2–14)
NEUTROPHILS # BLD AUTO: 7.22 K/UL — SIGNIFICANT CHANGE UP (ref 1.8–7.4)
NEUTROPHILS NFR BLD AUTO: 64.6 % — SIGNIFICANT CHANGE UP (ref 43–77)
NRBC # FLD: 0 — SIGNIFICANT CHANGE UP
PHOSPHATE SERPL-MCNC: 3.7 MG/DL — SIGNIFICANT CHANGE UP (ref 2.5–4.5)
PLATELET # BLD AUTO: 314 K/UL — SIGNIFICANT CHANGE UP (ref 150–400)
PMV BLD: 12.6 FL — SIGNIFICANT CHANGE UP (ref 7–13)
POTASSIUM SERPL-MCNC: 4.1 MMOL/L — SIGNIFICANT CHANGE UP (ref 3.5–5.3)
POTASSIUM SERPL-SCNC: 4.1 MMOL/L — SIGNIFICANT CHANGE UP (ref 3.5–5.3)
PROT SERPL-MCNC: 6.2 G/DL — SIGNIFICANT CHANGE UP (ref 6–8.3)
RBC # BLD: 2.9 M/UL — LOW (ref 4.2–5.8)
RBC # FLD: 15.6 % — HIGH (ref 10.3–14.5)
SODIUM SERPL-SCNC: 147 MMOL/L — HIGH (ref 135–145)
WBC # BLD: 11.17 K/UL — HIGH (ref 3.8–10.5)
WBC # FLD AUTO: 11.17 K/UL — HIGH (ref 3.8–10.5)

## 2017-12-12 PROCEDURE — 99233 SBSQ HOSP IP/OBS HIGH 50: CPT | Mod: GC

## 2017-12-12 RX ORDER — FUROSEMIDE 40 MG
40 TABLET ORAL ONCE
Qty: 0 | Refills: 0 | Status: COMPLETED | OUTPATIENT
Start: 2017-12-12 | End: 2017-12-12

## 2017-12-12 RX ORDER — FUROSEMIDE 40 MG
40 TABLET ORAL DAILY
Qty: 0 | Refills: 0 | Status: DISCONTINUED | OUTPATIENT
Start: 2017-12-12 | End: 2017-12-25

## 2017-12-12 RX ADMIN — ALBUTEROL 4 PUFF(S): 90 AEROSOL, METERED ORAL at 05:55

## 2017-12-12 RX ADMIN — CHLORHEXIDINE GLUCONATE 1 APPLICATION(S): 213 SOLUTION TOPICAL at 12:31

## 2017-12-12 RX ADMIN — Medication 27.5 MILLIGRAM(S): at 05:25

## 2017-12-12 RX ADMIN — FENTANYL CITRATE 1 PATCH: 50 INJECTION INTRAVENOUS at 12:33

## 2017-12-12 RX ADMIN — Medication 300 MILLIGRAM(S): at 12:32

## 2017-12-12 RX ADMIN — BUDESONIDE AND FORMOTEROL FUMARATE DIHYDRATE 2 PUFF(S): 160; 4.5 AEROSOL RESPIRATORY (INHALATION) at 21:58

## 2017-12-12 RX ADMIN — BUDESONIDE AND FORMOTEROL FUMARATE DIHYDRATE 2 PUFF(S): 160; 4.5 AEROSOL RESPIRATORY (INHALATION) at 08:43

## 2017-12-12 RX ADMIN — ALBUTEROL 4 PUFF(S): 90 AEROSOL, METERED ORAL at 16:52

## 2017-12-12 RX ADMIN — VORICONAZOLE 125 MILLIGRAM(S): 10 INJECTION, POWDER, LYOPHILIZED, FOR SOLUTION INTRAVENOUS at 14:50

## 2017-12-12 RX ADMIN — Medication 1 PUFF(S): at 08:43

## 2017-12-12 RX ADMIN — ALBUTEROL 4 PUFF(S): 90 AEROSOL, METERED ORAL at 21:58

## 2017-12-12 RX ADMIN — Medication 1 PUFF(S): at 04:02

## 2017-12-12 RX ADMIN — PANTOPRAZOLE SODIUM 40 MILLIGRAM(S): 20 TABLET, DELAYED RELEASE ORAL at 12:30

## 2017-12-12 RX ADMIN — Medication 125 MILLIGRAM(S): at 05:31

## 2017-12-12 RX ADMIN — Medication 40 MILLIGRAM(S): at 12:30

## 2017-12-12 RX ADMIN — Medication 1 MILLIGRAM(S): at 12:30

## 2017-12-12 RX ADMIN — FENTANYL CITRATE 1 PATCH: 50 INJECTION INTRAVENOUS at 12:32

## 2017-12-12 RX ADMIN — Medication 0.5 MILLIGRAM(S): at 05:31

## 2017-12-12 RX ADMIN — Medication 1 PUFF(S): at 21:58

## 2017-12-12 RX ADMIN — Medication 27.5 MILLIGRAM(S): at 18:38

## 2017-12-12 RX ADMIN — Medication 650 MILLIGRAM(S): at 18:44

## 2017-12-12 RX ADMIN — Medication 125 MILLIGRAM(S): at 23:52

## 2017-12-12 RX ADMIN — Medication 125 MILLIGRAM(S): at 18:38

## 2017-12-12 RX ADMIN — ALBUTEROL 4 PUFF(S): 90 AEROSOL, METERED ORAL at 13:14

## 2017-12-12 RX ADMIN — Medication 125 MILLIGRAM(S): at 12:31

## 2017-12-12 RX ADMIN — Medication 40 MILLIGRAM(S): at 18:43

## 2017-12-12 RX ADMIN — Medication 1 PUFF(S): at 16:52

## 2017-12-12 RX ADMIN — ALBUTEROL 4 PUFF(S): 90 AEROSOL, METERED ORAL at 03:24

## 2017-12-12 RX ADMIN — ALBUTEROL 4 PUFF(S): 90 AEROSOL, METERED ORAL at 08:43

## 2017-12-12 RX ADMIN — ENOXAPARIN SODIUM 40 MILLIGRAM(S): 100 INJECTION SUBCUTANEOUS at 12:31

## 2017-12-12 NOTE — PROGRESS NOTE ADULT - ASSESSMENT
47 year old male with hemoptysis : with multiple comorbidities: Has been on eliquis for hx of cva and has PFO.

## 2017-12-12 NOTE — CHART NOTE - NSCHARTNOTEFT_GEN_A_CORE
Source: Patient, nursing    Diet : NPO with tube feed - Jevity 1.2 @ 40 ml/hr 24 hrs     Patient alert, trached , with pressure ulcer - sacrum medial and right and tracheal , 1= edema L & R ankle . Per nursing, pt. complaining of feeling full and needs to stop feed , but current rate insufficient , will consider a concentrated formula to increase the nutrition intake with decreased volume .      Enteral: 1152 kcal & 53 gm protein/d        Current Weight: - 99.7 kg on 12/7/17 , Dry wt. - 75 kg     Pertinent Medications: MEDICATIONS  (STANDING):    ALBUTerol    90 MICROgram(s) HFA Inhaler 4 Puff(s) Inhalation every 4 hours  buDESOnide 160 MICROgram(s)/formoterol 4.5 MICROgram(s) Inhaler 2 Puff(s) Inhalation two times a day  chlorhexidine 4% Liquid 1 Application(s) Topical daily  enoxaparin Injectable 40 milliGRAM(s) SubCutaneous daily  ferrous    sulfate Liquid 300 milliGRAM(s) Enteral Tube daily  folic acid 1 milliGRAM(s) Oral daily  furosemide   Injectable 40 milliGRAM(s) IV Push daily  insulin lispro (HumaLOG) corrective regimen sliding scale   SubCutaneous every 6 hours  ipratropium 17 MICROgram(s) HFA Inhaler 1 Puff(s) Inhalation every 6 hours  LORazepam   Injectable 0.5 milliGRAM(s) IV Push every 8 hours  pantoprazole  Injectable 40 milliGRAM(s) IV Push daily  valproate sodium IVPB 500 milliGRAM(s) IV Intermittent every 12 hours  vancomycin    Solution 125 milliGRAM(s) Enteral Tube every 6 hours  voriconazole IVPB 300 milliGRAM(s) IV Intermittent every 12 hours    MEDICATIONS  (PRN):  acetaminophen    Suspension. 650 milliGRAM(s) Enteral Tube every 6 hours PRN Mild Pain to moderate (1 - 6)  acetaminophen   Tablet 650 milliGRAM(s) Oral every 6 hours PRN For Temp greater than 38 C (100.4 F)    Pertinent Labs:  12-12 Na147 mmol/L<H> Glu 107 mg/dL<H> K+ 4.1 mmol/L Cr  0.60 mg/dL BUN 8 mg/dL Phos 3.7 mg/dL Alb 2.6 g/dL<L> PAB n/a         Estimated Needs:  - 1500- 1878 kcal/d ( 20 - 25 kcal/kg dry wt.) protein 75-90  gm /d ( 1.0 -1.2 gm /kg dry wt. )     New Nutrition Diagnosis: - Inadequate Protein Energy Intake   Related to:  EN not @ goal   As evidenced by: estimated nutrition intake insufficient  to meet estimated need   Interventions: adjust EN formula and adjust the rate     Recommend _ Two Ben HN @ 35 ml/hr 24hrs = 1680 kcal & 70 gm protein/d , add no carb Pro source 1 pack daily to increase protein to 85 gm/d .    Monitoring and Evaluation:     1. Tolerance to diet prescription   2. weights   3. follow up per protocol

## 2017-12-12 NOTE — PROGRESS NOTE ADULT - SUBJECTIVE AND OBJECTIVE BOX
CHIEF COMPLAINT:    Interval Events:    REVIEW OF SYSTEMS:  Constitutional:   Eyes:  ENT:  CV:  Resp:  GI:  :  MSK:  Integumentary:  Neurological:  Psychiatric:  Endocrine:  Hematologic/Lymphatic:  Allergic/Immunologic:  [ ] All other systems negative  [ ] Unable to assess ROS because ________    OBJECTIVE:  ICU Vital Signs Last 24 Hrs  T(C): 36.9 (12 Dec 2017 05:18), Max: 37.4 (11 Dec 2017 10:00)  T(F): 98.4 (12 Dec 2017 05:18), Max: 99.3 (11 Dec 2017 10:00)  HR: 108 (12 Dec 2017 08:43) (97 - 115)  BP: 125/88 (12 Dec 2017 05:40) (116/72 - 145/91)  BP(mean): 96 (11 Dec 2017 13:56) (96 - 96)  ABP: --  ABP(mean): --  RR: 24 (12 Dec 2017 05:18) (15 - 24)  SpO2: 100% (12 Dec 2017 06:43) (98% - 100%)    Mode: AC/ CMV (Assist Control/ Continuous Mandatory Ventilation), RR (machine): 12, TV (machine): 450, FiO2: 40, PEEP: 5, MAP: 11, PIP: 32     @ 07:01  -  - @ 07:00  --------------------------------------------------------  IN: 780 mL / OUT: 1700 mL / NET: -920 mL      CAPILLARY BLOOD GLUCOSE      POCT Blood Glucose.: 93 mg/dL (12 Dec 2017 05:55)      PHYSICAL EXAM:  General:   HEENT:   Lymph Nodes:  Neck:   Respiratory:   Cardiovascular:   Abdomen:   Extremities:   Skin:   Neurological:  Psychiatry:    HOSPITAL MEDICATIONS:  MEDICATIONS  (STANDING):  ALBUTerol    90 MICROgram(s) HFA Inhaler 4 Puff(s) Inhalation every 4 hours  buDESOnide 160 MICROgram(s)/formoterol 4.5 MICROgram(s) Inhaler 2 Puff(s) Inhalation two times a day  chlorhexidine 4% Liquid 1 Application(s) Topical daily  enoxaparin Injectable 40 milliGRAM(s) SubCutaneous daily  fentaNYL   Patch  50 MICROgram(s)/Hr 1 Patch Transdermal every 72 hours  ferrous    sulfate Liquid 300 milliGRAM(s) Enteral Tube daily  folic acid 1 milliGRAM(s) Oral daily  insulin lispro (HumaLOG) corrective regimen sliding scale   SubCutaneous every 6 hours  ipratropium 17 MICROgram(s) HFA Inhaler 1 Puff(s) Inhalation every 6 hours  LORazepam   Injectable 0.5 milliGRAM(s) IV Push every 8 hours  pantoprazole  Injectable 40 milliGRAM(s) IV Push daily  valproate sodium IVPB 500 milliGRAM(s) IV Intermittent every 12 hours  vancomycin    Solution 125 milliGRAM(s) Enteral Tube every 6 hours  voriconazole IVPB 300 milliGRAM(s) IV Intermittent every 12 hours    MEDICATIONS  (PRN):  acetaminophen    Suspension. 650 milliGRAM(s) Enteral Tube every 6 hours PRN Mild Pain to moderate (1 - 6)  acetaminophen   Tablet 650 milliGRAM(s) Oral every 6 hours PRN For Temp greater than 38 C (100.4 F)      LABS:                        8.5    11.17 )-----------( 314      ( 12 Dec 2017 05:20 )             27.9     12-12    147<H>  |  102  |  8   ----------------------------<  107<H>  4.1   |  37<H>  |  0.60    Ca    8.6      12 Dec 2017 05:20  Phos  3.7     12-12  Mg     2.1     12-12    TPro  6.2  /  Alb  2.6<L>  /  TBili  0.4  /  DBili  x   /  AST  53<H>  /  ALT  43<H>  /  AlkPhos  227<H>  12-12      Urinalysis Basic - ( 10 Dec 2017 18:10 )    Color: YELLOW / Appearance: HAZY / S.010 / pH: 7.5  Gluc: NEGATIVE / Ketone: NEGATIVE  / Bili: NEGATIVE / Urobili: NORMAL mg/dL   Blood: NEGATIVE / Protein: 100 mg/dL / Nitrite: NEGATIVE   Leuk Esterase: NEGATIVE / RBC: 0-2 / WBC 0-2   Sq Epi: OCC / Non Sq Epi: x / Bacteria: x      Arterial Blood Gas:   @ 07:20  7.39/60/195/33/99.8/9.5  ABG lactate: --        MICROBIOLOGY:     RADIOLOGY:  [ ] Reviewed and interpreted by me    PULMONARY FUNCTION TESTS:    EKG: CHIEF COMPLAINT: Patient is a 47y old  Male who presents with a chief complaint of Patient is a 47y old  Male who presents with a chief complaint of hemoptysis (2017 00:32) (09 Dec 2017 16:29)    Interval Events: No acute events overnight    REVIEW OF SYSTEMS:  Constitutional:   Eyes:  ENT:  CV: Denies CP  Resp: Denies respiratory distress. Does say that he has difficulty getting a breath at times.  GI: c/o diarrhea  :  MSK: c/o being weak  Integumentary:  Neurological:  Psychiatric:  Endocrine:  Hematologic/Lymphatic:  Allergic/Immunologic:  [x] All other systems negative  [ ] Unable to assess ROS because ________    OBJECTIVE:  ICU Vital Signs Last 24 Hrs  T(C): 36.9 (12 Dec 2017 05:18), Max: 37.4 (11 Dec 2017 10:00)  T(F): 98.4 (12 Dec 2017 05:18), Max: 99.3 (11 Dec 2017 10:00)  HR: 108 (12 Dec 2017 08:43) (97 - 115)  BP: 125/88 (12 Dec 2017 05:40) (116/72 - 145/91)  BP(mean): 96 (11 Dec 2017 13:56) (96 - 96)  ABP: --  ABP(mean): --  RR: 24 (12 Dec 2017 05:18) (15 - 24)  SpO2: 100% (12 Dec 2017 06:43) (98% - 100%)    Mode: AC/ CMV (Assist Control/ Continuous Mandatory Ventilation), RR (machine): 12, TV (machine): 450, FiO2: 40, PEEP: 5, MAP: 11, PIP: 32     @ 07:01  -   @ 07:00  --------------------------------------------------------  IN: 780 mL / OUT: 1700 mL / NET: -920 mL      CAPILLARY BLOOD GLUCOSE      POCT Blood Glucose.: 93 mg/dL (12 Dec 2017 05:55)      HOSPITAL MEDICATIONS:  MEDICATIONS  (STANDING):  ALBUTerol    90 MICROgram(s) HFA Inhaler 4 Puff(s) Inhalation every 4 hours  buDESOnide 160 MICROgram(s)/formoterol 4.5 MICROgram(s) Inhaler 2 Puff(s) Inhalation two times a day  chlorhexidine 4% Liquid 1 Application(s) Topical daily  enoxaparin Injectable 40 milliGRAM(s) SubCutaneous daily  fentaNYL   Patch  50 MICROgram(s)/Hr 1 Patch Transdermal every 72 hours  ferrous    sulfate Liquid 300 milliGRAM(s) Enteral Tube daily  folic acid 1 milliGRAM(s) Oral daily  insulin lispro (HumaLOG) corrective regimen sliding scale   SubCutaneous every 6 hours  ipratropium 17 MICROgram(s) HFA Inhaler 1 Puff(s) Inhalation every 6 hours  LORazepam   Injectable 0.5 milliGRAM(s) IV Push every 8 hours  pantoprazole  Injectable 40 milliGRAM(s) IV Push daily  valproate sodium IVPB 500 milliGRAM(s) IV Intermittent every 12 hours  vancomycin    Solution 125 milliGRAM(s) Enteral Tube every 6 hours  voriconazole IVPB 300 milliGRAM(s) IV Intermittent every 12 hours    MEDICATIONS  (PRN):  acetaminophen    Suspension. 650 milliGRAM(s) Enteral Tube every 6 hours PRN Mild Pain to moderate (1 - 6)  acetaminophen   Tablet 650 milliGRAM(s) Oral every 6 hours PRN For Temp greater than 38 C (100.4 F)      LABS:                        8.5    11. )-----------( 314      ( 12 Dec 2017 05:20 )             27.9     12-12    147<H>  |  102  |  8   ----------------------------<  107<H>  4.1   |  37<H>  |  0.60    Ca    8.6      12 Dec 2017 05:20  Phos  3.7     12-12  Mg     2.1     12-12    TPro  6.2  /  Alb  2.6<L>  /  TBili  0.4  /  DBili  x   /  AST  53<H>  /  ALT  43<H>  /  AlkPhos  227<H>  12-12      Urinalysis Basic - ( 10 Dec 2017 18:10 )    Color: YELLOW / Appearance: HAZY / S.010 / pH: 7.5  Gluc: NEGATIVE / Ketone: NEGATIVE  / Bili: NEGATIVE / Urobili: NORMAL mg/dL   Blood: NEGATIVE / Protein: 100 mg/dL / Nitrite: NEGATIVE   Leuk Esterase: NEGATIVE / RBC: 0-2 / WBC 0-2   Sq Epi: OCC / Non Sq Epi: x / Bacteria: x      Arterial Blood Gas:  12-11 @ 07:20  7.39/60/195/33/99.8/9.5  ABG lactate: --        MICROBIOLOGY:     RADIOLOGY:  [ ] Reviewed and interpreted by me    PULMONARY FUNCTION TESTS:    EKG:

## 2017-12-12 NOTE — PROGRESS NOTE ADULT - PROBLEM SELECTOR PLAN 2
-Trach to vent.  -Daily Lasix   -Ativan decreased to 0.5 every 8 hours   -Difficulty getting a breath at times. Tolerated short trial of CPAP.

## 2017-12-13 LAB
BUN SERPL-MCNC: 10 MG/DL — SIGNIFICANT CHANGE UP (ref 7–23)
CALCIUM SERPL-MCNC: 8.3 MG/DL — LOW (ref 8.4–10.5)
CHLORIDE SERPL-SCNC: 98 MMOL/L — SIGNIFICANT CHANGE UP (ref 98–107)
CO2 SERPL-SCNC: 40 MMOL/L — HIGH (ref 22–31)
CREAT SERPL-MCNC: 0.64 MG/DL — SIGNIFICANT CHANGE UP (ref 0.5–1.3)
GLUCOSE BLDC GLUCOMTR-MCNC: 103 MG/DL — HIGH (ref 70–99)
GLUCOSE BLDC GLUCOMTR-MCNC: 110 MG/DL — HIGH (ref 70–99)
GLUCOSE BLDC GLUCOMTR-MCNC: 112 MG/DL — HIGH (ref 70–99)
GLUCOSE BLDC GLUCOMTR-MCNC: 91 MG/DL — SIGNIFICANT CHANGE UP (ref 70–99)
GLUCOSE SERPL-MCNC: 119 MG/DL — HIGH (ref 70–99)
HCT VFR BLD CALC: 25.2 % — LOW (ref 39–50)
HGB BLD-MCNC: 8.1 G/DL — LOW (ref 13–17)
MCHC RBC-ENTMCNC: 30.5 PG — SIGNIFICANT CHANGE UP (ref 27–34)
MCHC RBC-ENTMCNC: 32.1 % — SIGNIFICANT CHANGE UP (ref 32–36)
MCV RBC AUTO: 94.7 FL — SIGNIFICANT CHANGE UP (ref 80–100)
NRBC # FLD: 0 — SIGNIFICANT CHANGE UP
PLATELET # BLD AUTO: 260 K/UL — SIGNIFICANT CHANGE UP (ref 150–400)
PMV BLD: 12.3 FL — SIGNIFICANT CHANGE UP (ref 7–13)
POTASSIUM SERPL-MCNC: 3.8 MMOL/L — SIGNIFICANT CHANGE UP (ref 3.5–5.3)
POTASSIUM SERPL-SCNC: 3.8 MMOL/L — SIGNIFICANT CHANGE UP (ref 3.5–5.3)
RBC # BLD: 2.66 M/UL — LOW (ref 4.2–5.8)
RBC # FLD: 15.7 % — HIGH (ref 10.3–14.5)
SODIUM SERPL-SCNC: 145 MMOL/L — SIGNIFICANT CHANGE UP (ref 135–145)
WBC # BLD: 9.28 K/UL — SIGNIFICANT CHANGE UP (ref 3.8–10.5)
WBC # FLD AUTO: 9.28 K/UL — SIGNIFICANT CHANGE UP (ref 3.8–10.5)

## 2017-12-13 PROCEDURE — 99233 SBSQ HOSP IP/OBS HIGH 50: CPT | Mod: GC

## 2017-12-13 RX ADMIN — ALBUTEROL 4 PUFF(S): 90 AEROSOL, METERED ORAL at 22:12

## 2017-12-13 RX ADMIN — Medication 125 MILLIGRAM(S): at 17:22

## 2017-12-13 RX ADMIN — PANTOPRAZOLE SODIUM 40 MILLIGRAM(S): 20 TABLET, DELAYED RELEASE ORAL at 12:16

## 2017-12-13 RX ADMIN — ALBUTEROL 4 PUFF(S): 90 AEROSOL, METERED ORAL at 05:11

## 2017-12-13 RX ADMIN — Medication 1 PUFF(S): at 17:16

## 2017-12-13 RX ADMIN — Medication 125 MILLIGRAM(S): at 23:22

## 2017-12-13 RX ADMIN — Medication 1 PUFF(S): at 09:59

## 2017-12-13 RX ADMIN — Medication 40 MILLIGRAM(S): at 06:07

## 2017-12-13 RX ADMIN — ALBUTEROL 4 PUFF(S): 90 AEROSOL, METERED ORAL at 17:16

## 2017-12-13 RX ADMIN — VORICONAZOLE 125 MILLIGRAM(S): 10 INJECTION, POWDER, LYOPHILIZED, FOR SOLUTION INTRAVENOUS at 02:01

## 2017-12-13 RX ADMIN — VORICONAZOLE 125 MILLIGRAM(S): 10 INJECTION, POWDER, LYOPHILIZED, FOR SOLUTION INTRAVENOUS at 22:42

## 2017-12-13 RX ADMIN — VORICONAZOLE 125 MILLIGRAM(S): 10 INJECTION, POWDER, LYOPHILIZED, FOR SOLUTION INTRAVENOUS at 12:15

## 2017-12-13 RX ADMIN — ALBUTEROL 4 PUFF(S): 90 AEROSOL, METERED ORAL at 13:36

## 2017-12-13 RX ADMIN — ALBUTEROL 4 PUFF(S): 90 AEROSOL, METERED ORAL at 09:59

## 2017-12-13 RX ADMIN — Medication 1 MILLIGRAM(S): at 12:15

## 2017-12-13 RX ADMIN — ENOXAPARIN SODIUM 40 MILLIGRAM(S): 100 INJECTION SUBCUTANEOUS at 12:15

## 2017-12-13 RX ADMIN — Medication 27.5 MILLIGRAM(S): at 06:08

## 2017-12-13 RX ADMIN — BUDESONIDE AND FORMOTEROL FUMARATE DIHYDRATE 2 PUFF(S): 160; 4.5 AEROSOL RESPIRATORY (INHALATION) at 22:14

## 2017-12-13 RX ADMIN — Medication 125 MILLIGRAM(S): at 12:16

## 2017-12-13 RX ADMIN — Medication 300 MILLIGRAM(S): at 12:15

## 2017-12-13 RX ADMIN — Medication 27.5 MILLIGRAM(S): at 17:22

## 2017-12-13 RX ADMIN — ALBUTEROL 4 PUFF(S): 90 AEROSOL, METERED ORAL at 01:59

## 2017-12-13 RX ADMIN — Medication 125 MILLIGRAM(S): at 06:07

## 2017-12-13 RX ADMIN — Medication 1 PUFF(S): at 22:15

## 2017-12-13 RX ADMIN — BUDESONIDE AND FORMOTEROL FUMARATE DIHYDRATE 2 PUFF(S): 160; 4.5 AEROSOL RESPIRATORY (INHALATION) at 09:59

## 2017-12-13 RX ADMIN — CHLORHEXIDINE GLUCONATE 1 APPLICATION(S): 213 SOLUTION TOPICAL at 12:15

## 2017-12-13 RX ADMIN — Medication 1 PUFF(S): at 05:11

## 2017-12-13 NOTE — PROGRESS NOTE ADULT - PROBLEM SELECTOR PLAN 2
-Trach to vent.  -Daily Lasix and bid as needed  -Ativan decreased to 0.5 every 8 hours prn  -Difficulty getting a breath at times. Tolerated short trial of CPAP.

## 2017-12-13 NOTE — PROGRESS NOTE ADULT - SUBJECTIVE AND OBJECTIVE BOX
CHIEF COMPLAINT:    Interval Events:    REVIEW OF SYSTEMS:  Constitutional:   Eyes:  ENT:  CV:  Resp:  GI:  :  MSK:  Integumentary:  Neurological:  Psychiatric:  Endocrine:  Hematologic/Lymphatic:  Allergic/Immunologic:  [ ] All other systems negative  [ ] Unable to assess ROS because ________    OBJECTIVE:  ICU Vital Signs Last 24 Hrs  T(C): 37 (13 Dec 2017 05:06), Max: 38.2 (12 Dec 2017 18:44)  T(F): 98.6 (13 Dec 2017 05:06), Max: 100.8 (12 Dec 2017 18:44)  HR: 121 (13 Dec 2017 07:59) (100 - 121)  BP: 115/81 (13 Dec 2017 05:06) (111/66 - 150/88)  BP(mean): --  ABP: --  ABP(mean): --  RR: 20 (13 Dec 2017 05:06) (17 - 26)  SpO2: 100% (13 Dec 2017 07:59) (97% - 100%)    Mode: AC/ CMV (Assist Control/ Continuous Mandatory Ventilation), RR (machine): 12, TV (machine): 450, FiO2: 40, PEEP: 5, ITime: 1, MAP: 12, PIP: 44    CAPILLARY BLOOD GLUCOSE      POCT Blood Glucose.: 110 mg/dL (13 Dec 2017 05:51)      PHYSICAL EXAM:  General:   HEENT:   Lymph Nodes:  Neck:   Respiratory:   Cardiovascular:   Abdomen:   Extremities:   Skin:   Neurological:  Psychiatry:    HOSPITAL MEDICATIONS:  MEDICATIONS  (STANDING):  ALBUTerol    90 MICROgram(s) HFA Inhaler 4 Puff(s) Inhalation every 4 hours  buDESOnide 160 MICROgram(s)/formoterol 4.5 MICROgram(s) Inhaler 2 Puff(s) Inhalation two times a day  chlorhexidine 4% Liquid 1 Application(s) Topical daily  enoxaparin Injectable 40 milliGRAM(s) SubCutaneous daily  ferrous    sulfate Liquid 300 milliGRAM(s) Enteral Tube daily  folic acid 1 milliGRAM(s) Oral daily  furosemide   Injectable 40 milliGRAM(s) IV Push daily  insulin lispro (HumaLOG) corrective regimen sliding scale   SubCutaneous every 6 hours  ipratropium 17 MICROgram(s) HFA Inhaler 1 Puff(s) Inhalation every 6 hours  pantoprazole  Injectable 40 milliGRAM(s) IV Push daily  valproate sodium IVPB 500 milliGRAM(s) IV Intermittent every 12 hours  vancomycin    Solution 125 milliGRAM(s) Enteral Tube every 6 hours  voriconazole IVPB 300 milliGRAM(s) IV Intermittent every 12 hours    MEDICATIONS  (PRN):  acetaminophen    Suspension. 650 milliGRAM(s) Enteral Tube every 6 hours PRN Mild Pain to moderate (1 - 6)  acetaminophen   Tablet 650 milliGRAM(s) Oral every 6 hours PRN For Temp greater than 38 C (100.4 F)  LORazepam   Injectable 0.5 milliGRAM(s) IV Push every 8 hours PRN Anxiety      LABS:                        8.1    9.28  )-----------( 260      ( 13 Dec 2017 05:30 )             25.2     12-13    145  |  98  |  10  ----------------------------<  119<H>  3.8   |  40<H>  |  0.64    Ca    8.3<L>      13 Dec 2017 05:30  Phos  3.7     12-12  Mg     2.1     12-12    TPro  6.2  /  Alb  2.6<L>  /  TBili  0.4  /  DBili  x   /  AST  53<H>  /  ALT  43<H>  /  AlkPhos  227<H>  12-12              MICROBIOLOGY:     RADIOLOGY:  [ ] Reviewed and interpreted by me    PULMONARY FUNCTION TESTS:    EKG: CHIEF COMPLAINT: Patient is a 47y old  Male who presents with a chief complaint of Patient is a 47y old  Male who presents with a chief complaint of hemoptysis (09 Nov 2017 00:32) (09 Dec 2017 16:29)    Interval Events: Tmax 100.8    REVIEW OF SYSTEMS:  Constitutional:   Eyes:  ENT:  CV:  Resp:  GI:  :  MSK:  Integumentary:  Neurological:  Psychiatric:  Endocrine:  Hematologic/Lymphatic:  Allergic/Immunologic:  [ ] All other systems negative  [ ] Unable to assess ROS because ________    OBJECTIVE:  ICU Vital Signs Last 24 Hrs  T(C): 37 (13 Dec 2017 05:06), Max: 38.2 (12 Dec 2017 18:44)  T(F): 98.6 (13 Dec 2017 05:06), Max: 100.8 (12 Dec 2017 18:44)  HR: 121 (13 Dec 2017 07:59) (100 - 121)  BP: 115/81 (13 Dec 2017 05:06) (111/66 - 150/88)  BP(mean): --  ABP: --  ABP(mean): --  RR: 20 (13 Dec 2017 05:06) (17 - 26)  SpO2: 100% (13 Dec 2017 07:59) (97% - 100%)    Mode: AC/ CMV (Assist Control/ Continuous Mandatory Ventilation), RR (machine): 12, TV (machine): 450, FiO2: 40, PEEP: 5, ITime: 1, MAP: 12, PIP: 44    CAPILLARY BLOOD GLUCOSE      POCT Blood Glucose.: 110 mg/dL (13 Dec 2017 05:51)      PHYSICAL EXAM:  General:   HEENT:   Lymph Nodes:  Neck:   Respiratory:   Cardiovascular:   Abdomen:   Extremities:   Skin:   Neurological:  Psychiatry:    HOSPITAL MEDICATIONS:  MEDICATIONS  (STANDING):  ALBUTerol    90 MICROgram(s) HFA Inhaler 4 Puff(s) Inhalation every 4 hours  buDESOnide 160 MICROgram(s)/formoterol 4.5 MICROgram(s) Inhaler 2 Puff(s) Inhalation two times a day  chlorhexidine 4% Liquid 1 Application(s) Topical daily  enoxaparin Injectable 40 milliGRAM(s) SubCutaneous daily  ferrous    sulfate Liquid 300 milliGRAM(s) Enteral Tube daily  folic acid 1 milliGRAM(s) Oral daily  furosemide   Injectable 40 milliGRAM(s) IV Push daily  insulin lispro (HumaLOG) corrective regimen sliding scale   SubCutaneous every 6 hours  ipratropium 17 MICROgram(s) HFA Inhaler 1 Puff(s) Inhalation every 6 hours  pantoprazole  Injectable 40 milliGRAM(s) IV Push daily  valproate sodium IVPB 500 milliGRAM(s) IV Intermittent every 12 hours  vancomycin    Solution 125 milliGRAM(s) Enteral Tube every 6 hours  voriconazole IVPB 300 milliGRAM(s) IV Intermittent every 12 hours    MEDICATIONS  (PRN):  acetaminophen    Suspension. 650 milliGRAM(s) Enteral Tube every 6 hours PRN Mild Pain to moderate (1 - 6)  acetaminophen   Tablet 650 milliGRAM(s) Oral every 6 hours PRN For Temp greater than 38 C (100.4 F)  LORazepam   Injectable 0.5 milliGRAM(s) IV Push every 8 hours PRN Anxiety      LABS:                        8.1    9.28  )-----------( 260      ( 13 Dec 2017 05:30 )             25.2     12-13    145  |  98  |  10  ----------------------------<  119<H>  3.8   |  40<H>  |  0.64    Ca    8.3<L>      13 Dec 2017 05:30  Phos  3.7     12-12  Mg     2.1     12-12    TPro  6.2  /  Alb  2.6<L>  /  TBili  0.4  /  DBili  x   /  AST  53<H>  /  ALT  43<H>  /  AlkPhos  227<H>  12-12              MICROBIOLOGY:     RADIOLOGY:  [ ] Reviewed and interpreted by me    PULMONARY FUNCTION TESTS:    EKG: CHIEF COMPLAINT: Patient is a 47y old  Male who presents with a chief complaint of Patient is a 47y old  Male who presents with a chief complaint of hemoptysis (09 Nov 2017 00:32) (09 Dec 2017 16:29)    Interval Events: Tmax 100.8    REVIEW OF SYSTEMS:  Constitutional: Feels weak  Eyes:  ENT:  CV: Denies CP  Resp: Intermittent SOB  GI:  :  MSK: c/o weakness  Integumentary:  Neurological:  Psychiatric:  Endocrine:  Hematologic/Lymphatic:  Allergic/Immunologic:  [x] All other systems negative  [ ] Unable to assess ROS because ________    OBJECTIVE:  ICU Vital Signs Last 24 Hrs  T(C): 37 (13 Dec 2017 05:06), Max: 38.2 (12 Dec 2017 18:44)  T(F): 98.6 (13 Dec 2017 05:06), Max: 100.8 (12 Dec 2017 18:44)  HR: 121 (13 Dec 2017 07:59) (100 - 121)  BP: 115/81 (13 Dec 2017 05:06) (111/66 - 150/88)  BP(mean): --  ABP: --  ABP(mean): --  RR: 20 (13 Dec 2017 05:06) (17 - 26)  SpO2: 100% (13 Dec 2017 07:59) (97% - 100%)    Mode: AC/ CMV (Assist Control/ Continuous Mandatory Ventilation), RR (machine): 12, TV (machine): 450, FiO2: 40, PEEP: 5, ITime: 1, MAP: 12, PIP: 44    CAPILLARY BLOOD GLUCOSE      POCT Blood Glucose.: 110 mg/dL (13 Dec 2017 05:51)    HOSPITAL MEDICATIONS:  MEDICATIONS  (STANDING):  ALBUTerol    90 MICROgram(s) HFA Inhaler 4 Puff(s) Inhalation every 4 hours  buDESOnide 160 MICROgram(s)/formoterol 4.5 MICROgram(s) Inhaler 2 Puff(s) Inhalation two times a day  chlorhexidine 4% Liquid 1 Application(s) Topical daily  enoxaparin Injectable 40 milliGRAM(s) SubCutaneous daily  ferrous    sulfate Liquid 300 milliGRAM(s) Enteral Tube daily  folic acid 1 milliGRAM(s) Oral daily  furosemide   Injectable 40 milliGRAM(s) IV Push daily  insulin lispro (HumaLOG) corrective regimen sliding scale   SubCutaneous every 6 hours  ipratropium 17 MICROgram(s) HFA Inhaler 1 Puff(s) Inhalation every 6 hours  pantoprazole  Injectable 40 milliGRAM(s) IV Push daily  valproate sodium IVPB 500 milliGRAM(s) IV Intermittent every 12 hours  vancomycin    Solution 125 milliGRAM(s) Enteral Tube every 6 hours  voriconazole IVPB 300 milliGRAM(s) IV Intermittent every 12 hours    MEDICATIONS  (PRN):  acetaminophen    Suspension. 650 milliGRAM(s) Enteral Tube every 6 hours PRN Mild Pain to moderate (1 - 6)  acetaminophen   Tablet 650 milliGRAM(s) Oral every 6 hours PRN For Temp greater than 38 C (100.4 F)  LORazepam   Injectable 0.5 milliGRAM(s) IV Push every 8 hours PRN Anxiety      LABS:                        8.1    9.28  )-----------( 260      ( 13 Dec 2017 05:30 )             25.2     12-13    145  |  98  |  10  ----------------------------<  119<H>  3.8   |  40<H>  |  0.64    Ca    8.3<L>      13 Dec 2017 05:30  Phos  3.7     12-12  Mg     2.1     12-12    TPro  6.2  /  Alb  2.6<L>  /  TBili  0.4  /  DBili  x   /  AST  53<H>  /  ALT  43<H>  /  AlkPhos  227<H>  12-12              MICROBIOLOGY:     RADIOLOGY:  [ ] Reviewed and interpreted by me    PULMONARY FUNCTION TESTS:    EKG:

## 2017-12-14 DIAGNOSIS — R53.81 OTHER MALAISE: ICD-10-CM

## 2017-12-14 LAB
APPEARANCE UR: CLEAR — SIGNIFICANT CHANGE UP
BILIRUB UR-MCNC: NEGATIVE — SIGNIFICANT CHANGE UP
BLOOD UR QL VISUAL: NEGATIVE — SIGNIFICANT CHANGE UP
BUN SERPL-MCNC: 11 MG/DL — SIGNIFICANT CHANGE UP (ref 7–23)
CALCIUM SERPL-MCNC: 8.3 MG/DL — LOW (ref 8.4–10.5)
CHLORIDE SERPL-SCNC: 95 MMOL/L — LOW (ref 98–107)
CO2 SERPL-SCNC: 41 MMOL/L — HIGH (ref 22–31)
COLOR SPEC: YELLOW — SIGNIFICANT CHANGE UP
CREAT SERPL-MCNC: 0.64 MG/DL — SIGNIFICANT CHANGE UP (ref 0.5–1.3)
FUNGUS SPEC QL CULT: SIGNIFICANT CHANGE UP
GLUCOSE BLDC GLUCOMTR-MCNC: 113 MG/DL — HIGH (ref 70–99)
GLUCOSE BLDC GLUCOMTR-MCNC: 118 MG/DL — HIGH (ref 70–99)
GLUCOSE BLDC GLUCOMTR-MCNC: 125 MG/DL — HIGH (ref 70–99)
GLUCOSE BLDC GLUCOMTR-MCNC: 99 MG/DL — SIGNIFICANT CHANGE UP (ref 70–99)
GLUCOSE SERPL-MCNC: 122 MG/DL — HIGH (ref 70–99)
GLUCOSE UR-MCNC: NEGATIVE — SIGNIFICANT CHANGE UP
GRAM STN SPT: SIGNIFICANT CHANGE UP
HCT VFR BLD CALC: 24.9 % — LOW (ref 39–50)
HGB BLD-MCNC: 7.9 G/DL — LOW (ref 13–17)
HYALINE CASTS # UR AUTO: SIGNIFICANT CHANGE UP (ref 0–?)
KETONES UR-MCNC: NEGATIVE — SIGNIFICANT CHANGE UP
LEUKOCYTE ESTERASE UR-ACNC: NEGATIVE — SIGNIFICANT CHANGE UP
MCHC RBC-ENTMCNC: 30.3 PG — SIGNIFICANT CHANGE UP (ref 27–34)
MCHC RBC-ENTMCNC: 31.7 % — LOW (ref 32–36)
MCV RBC AUTO: 95.4 FL — SIGNIFICANT CHANGE UP (ref 80–100)
MUCOUS THREADS # UR AUTO: SIGNIFICANT CHANGE UP
NITRITE UR-MCNC: NEGATIVE — SIGNIFICANT CHANGE UP
NON-SQ EPI CELLS # UR AUTO: <1 — SIGNIFICANT CHANGE UP
NRBC # FLD: 0 — SIGNIFICANT CHANGE UP
PH UR: 8.5 — HIGH (ref 4.6–8)
PLATELET # BLD AUTO: 242 K/UL — SIGNIFICANT CHANGE UP (ref 150–400)
PMV BLD: 13 FL — SIGNIFICANT CHANGE UP (ref 7–13)
POTASSIUM SERPL-MCNC: 3.6 MMOL/L — SIGNIFICANT CHANGE UP (ref 3.5–5.3)
POTASSIUM SERPL-SCNC: 3.6 MMOL/L — SIGNIFICANT CHANGE UP (ref 3.5–5.3)
PROT UR-MCNC: 150 MG/DL — HIGH
RBC # BLD: 2.61 M/UL — LOW (ref 4.2–5.8)
RBC # FLD: 15.5 % — HIGH (ref 10.3–14.5)
RBC CASTS # UR COMP ASSIST: SIGNIFICANT CHANGE UP (ref 0–?)
SODIUM SERPL-SCNC: 144 MMOL/L — SIGNIFICANT CHANGE UP (ref 135–145)
SP GR SPEC: 1.02 — SIGNIFICANT CHANGE UP (ref 1–1.04)
SPECIMEN SOURCE: SIGNIFICANT CHANGE UP
SQUAMOUS # UR AUTO: SIGNIFICANT CHANGE UP
UROBILINOGEN FLD QL: NORMAL MG/DL — SIGNIFICANT CHANGE UP
WBC # BLD: 11.98 K/UL — HIGH (ref 3.8–10.5)
WBC # FLD AUTO: 11.98 K/UL — HIGH (ref 3.8–10.5)
WBC UR QL: SIGNIFICANT CHANGE UP (ref 0–?)

## 2017-12-14 PROCEDURE — 99232 SBSQ HOSP IP/OBS MODERATE 35: CPT | Mod: 25

## 2017-12-14 PROCEDURE — 99233 SBSQ HOSP IP/OBS HIGH 50: CPT | Mod: GC

## 2017-12-14 PROCEDURE — 71010: CPT | Mod: 26

## 2017-12-14 PROCEDURE — 31502 CHANGE OF WINDPIPE AIRWAY: CPT

## 2017-12-14 PROCEDURE — 99232 SBSQ HOSP IP/OBS MODERATE 35: CPT | Mod: GC

## 2017-12-14 RX ORDER — NOREPINEPHRINE BITARTRATE/D5W 8 MG/250ML
0.1 PLASTIC BAG, INJECTION (ML) INTRAVENOUS
Qty: 8 | Refills: 0 | Status: DISCONTINUED | OUTPATIENT
Start: 2017-12-14 | End: 2017-12-15

## 2017-12-14 RX ORDER — MIDAZOLAM HYDROCHLORIDE 1 MG/ML
4 INJECTION, SOLUTION INTRAMUSCULAR; INTRAVENOUS ONCE
Qty: 0 | Refills: 0 | Status: DISCONTINUED | OUTPATIENT
Start: 2017-12-14 | End: 2017-12-14

## 2017-12-14 RX ORDER — FENTANYL CITRATE 50 UG/ML
100 INJECTION INTRAVENOUS ONCE
Qty: 0 | Refills: 0 | Status: DISCONTINUED | OUTPATIENT
Start: 2017-12-14 | End: 2017-12-14

## 2017-12-14 RX ORDER — ACETAZOLAMIDE 250 MG/1
500 TABLET ORAL ONCE
Qty: 0 | Refills: 0 | Status: COMPLETED | OUTPATIENT
Start: 2017-12-14 | End: 2017-12-14

## 2017-12-14 RX ORDER — IBUPROFEN 200 MG
400 TABLET ORAL ONCE
Qty: 0 | Refills: 0 | Status: COMPLETED | OUTPATIENT
Start: 2017-12-14 | End: 2017-12-14

## 2017-12-14 RX ADMIN — Medication 1 MILLIGRAM(S): at 11:25

## 2017-12-14 RX ADMIN — ENOXAPARIN SODIUM 40 MILLIGRAM(S): 100 INJECTION SUBCUTANEOUS at 11:24

## 2017-12-14 RX ADMIN — Medication 125 MILLIGRAM(S): at 17:31

## 2017-12-14 RX ADMIN — Medication 125 MILLIGRAM(S): at 11:26

## 2017-12-14 RX ADMIN — FENTANYL CITRATE 100 MICROGRAM(S): 50 INJECTION INTRAVENOUS at 21:09

## 2017-12-14 RX ADMIN — VORICONAZOLE 125 MILLIGRAM(S): 10 INJECTION, POWDER, LYOPHILIZED, FOR SOLUTION INTRAVENOUS at 23:11

## 2017-12-14 RX ADMIN — Medication 40 MILLIGRAM(S): at 05:41

## 2017-12-14 RX ADMIN — FENTANYL CITRATE 100 MICROGRAM(S): 50 INJECTION INTRAVENOUS at 22:29

## 2017-12-14 RX ADMIN — ALBUTEROL 4 PUFF(S): 90 AEROSOL, METERED ORAL at 13:47

## 2017-12-14 RX ADMIN — ALBUTEROL 4 PUFF(S): 90 AEROSOL, METERED ORAL at 17:25

## 2017-12-14 RX ADMIN — ALBUTEROL 4 PUFF(S): 90 AEROSOL, METERED ORAL at 04:28

## 2017-12-14 RX ADMIN — CHLORHEXIDINE GLUCONATE 1 APPLICATION(S): 213 SOLUTION TOPICAL at 11:24

## 2017-12-14 RX ADMIN — Medication 1 PUFF(S): at 17:25

## 2017-12-14 RX ADMIN — MIDAZOLAM HYDROCHLORIDE 4 MILLIGRAM(S): 1 INJECTION, SOLUTION INTRAMUSCULAR; INTRAVENOUS at 21:18

## 2017-12-14 RX ADMIN — ACETAZOLAMIDE 500 MILLIGRAM(S): 250 TABLET ORAL at 11:24

## 2017-12-14 RX ADMIN — Medication 125 MILLIGRAM(S): at 05:41

## 2017-12-14 RX ADMIN — VORICONAZOLE 125 MILLIGRAM(S): 10 INJECTION, POWDER, LYOPHILIZED, FOR SOLUTION INTRAVENOUS at 11:23

## 2017-12-14 RX ADMIN — ALBUTEROL 4 PUFF(S): 90 AEROSOL, METERED ORAL at 01:22

## 2017-12-14 RX ADMIN — Medication 125 MILLIGRAM(S): at 23:11

## 2017-12-14 RX ADMIN — MIDAZOLAM HYDROCHLORIDE 4 MILLIGRAM(S): 1 INJECTION, SOLUTION INTRAMUSCULAR; INTRAVENOUS at 21:09

## 2017-12-14 RX ADMIN — Medication 27.5 MILLIGRAM(S): at 17:30

## 2017-12-14 RX ADMIN — Medication 300 MILLIGRAM(S): at 11:25

## 2017-12-14 RX ADMIN — Medication 0.5 MILLIGRAM(S): at 14:17

## 2017-12-14 RX ADMIN — Medication 400 MILLIGRAM(S): at 10:28

## 2017-12-14 RX ADMIN — Medication 1 PUFF(S): at 21:58

## 2017-12-14 RX ADMIN — Medication 14.06 MICROGRAM(S)/KG/MIN: at 23:12

## 2017-12-14 RX ADMIN — BUDESONIDE AND FORMOTEROL FUMARATE DIHYDRATE 2 PUFF(S): 160; 4.5 AEROSOL RESPIRATORY (INHALATION) at 10:38

## 2017-12-14 RX ADMIN — Medication 650 MILLIGRAM(S): at 05:42

## 2017-12-14 RX ADMIN — Medication 27.5 MILLIGRAM(S): at 05:42

## 2017-12-14 RX ADMIN — Medication 1 PUFF(S): at 04:28

## 2017-12-14 RX ADMIN — PANTOPRAZOLE SODIUM 40 MILLIGRAM(S): 20 TABLET, DELAYED RELEASE ORAL at 11:29

## 2017-12-14 RX ADMIN — ALBUTEROL 4 PUFF(S): 90 AEROSOL, METERED ORAL at 10:37

## 2017-12-14 RX ADMIN — ALBUTEROL 4 PUFF(S): 90 AEROSOL, METERED ORAL at 21:58

## 2017-12-14 RX ADMIN — BUDESONIDE AND FORMOTEROL FUMARATE DIHYDRATE 2 PUFF(S): 160; 4.5 AEROSOL RESPIRATORY (INHALATION) at 21:58

## 2017-12-14 RX ADMIN — Medication 1 PUFF(S): at 10:38

## 2017-12-14 NOTE — CHART NOTE - NSCHARTNOTEFT_GEN_A_CORE
MAR RRT NOTE    RRT called for patient for hypoxia. Patient on vent that was placed in MICU during this hospitalization a few weeks ago. Now with acute hypoxia. As per respiratory patient was not pulling in tidal volumes, had high peak pressures and appears to have possible cuff leak. Now being bagged for almost 1hr, sat 99%, VS stable. MICU called and attending at bedside. Plan to sedate, may need different size trach.     Reta Copeland MD   R3  [id number], [phone]

## 2017-12-14 NOTE — PROGRESS NOTE ADULT - PROBLEM SELECTOR PLAN 1
respiratory/trach care as per primary team  Turn and position q 2h   ID f/u   aggressive PT/OT- pt was walking in rehab prior to MICU admission  Will follow

## 2017-12-14 NOTE — CHART NOTE - NSCHARTNOTEFT_GEN_A_CORE
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 Transferred from RCU to MICU for low tidal volumes on ventilator in setting of deflated cuff. Attempts to reinflate unsuccessful and pt with unstable respiratory status as currently being bagged by respiratory therapist to sustain oxygenation. Transferred to MICU for emergency trach exchange.  On arrival pt tachypneic, discordant with bag valve breaths, and diaphoretic.  Procedure explained to patient and  placed in supine with neck hyperextended. Sedation achieved with Versed 8 mg and Fentanyl 100 mcg, blood pressure support with Levophed,. 6.0 cuffed Shiley trach exchanged and inflated without incident and secured. Saturation remained % throughout.  Post procedural CXR performed.  Pt placed back on ventilator /12/40%/5 with appropriate response. Tidal volumes now ranging 400-500, O2 saturation 97-98%, pt appears comfortable and stable. Will obtain ABG and monitor for several hours. Eligible back to RCU by AM if no further events occur overnight.

## 2017-12-14 NOTE — PROGRESS NOTE ADULT - SUBJECTIVE AND OBJECTIVE BOX
Seen and examined in the RCU  + Cdiff, fever. + trach.  Follows commands and participates with PT      CURRENT FUNCTIONAL STATUS mod a       REVIEW OF SYSTEMS  Constitutional - + fever   HEENT - No eye pain, No visual disturbances, No difficulty hearing, No tinnitus, No vertigo, No neck pain  Neurological - weakness of b/l LE, LUE   Musculoskeletal - No joint pain, No joint swelling, No muscle pain  Psychiatric - No depression, No anxiety  All other ROS negative,     RECENT LABS/IMAGING  CBC Full  -  ( 14 Dec 2017 06:00 )  WBC Count : 11.98 K/uL  Hemoglobin : 7.9 g/dL  Hematocrit : 24.9 %  Platelet Count - Automated : 242 K/uL  Mean Cell Volume : 95.4 fL  Mean Cell Hemoglobin : 30.3 pg  Mean Cell Hemoglobin Concentration : 31.7 %  Auto Neutrophil # : x  Auto Lymphocyte # : x  Auto Monocyte # : x  Auto Eosinophil # : x  Auto Basophil # : x  Auto Neutrophil % : x  Auto Lymphocyte % : x  Auto Monocyte % : x  Auto Eosinophil % : x  Auto Basophil % : x    12-14    144  |  95<L>  |  11  ----------------------------<  122<H>  3.6   |  41<H>  |  0.64    Ca    8.3<L>      14 Dec 2017 06:00          VITALS  T(C): 37.2 (12-14-17 @ 13:35), Max: 38 (12-14-17 @ 05:21)  HR: 99 (12-14-17 @ 14:16) (98 - 120)  BP: 162/90 (12-14-17 @ 14:16) (123/80 - 162/90)  RR: 26 (12-14-17 @ 14:16) (14 - 26)  SpO2: 94% (12-14-17 @ 14:16) (94% - 100%)  Wt(kg): --    MEDICATIONS   acetaminophen    Suspension. 650 milliGRAM(s) every 6 hours PRN  acetaminophen   Tablet 650 milliGRAM(s) every 6 hours PRN  ALBUTerol    90 MICROgram(s) HFA Inhaler 4 Puff(s) every 4 hours  buDESOnide 160 MICROgram(s)/formoterol 4.5 MICROgram(s) Inhaler 2 Puff(s) two times a day  chlorhexidine 4% Liquid 1 Application(s) daily  enoxaparin Injectable 40 milliGRAM(s) daily  ferrous    sulfate Liquid 300 milliGRAM(s) daily  folic acid 1 milliGRAM(s) daily  furosemide   Injectable 40 milliGRAM(s) daily  insulin lispro (HumaLOG) corrective regimen sliding scale   every 6 hours  ipratropium 17 MICROgram(s) HFA Inhaler 1 Puff(s) every 6 hours  LORazepam   Injectable 0.5 milliGRAM(s) every 8 hours PRN  pantoprazole  Injectable 40 milliGRAM(s) daily  valproate sodium IVPB 500 milliGRAM(s) every 12 hours  vancomycin    Solution 125 milliGRAM(s) every 6 hours  voriconazole IVPB 300 milliGRAM(s) every 12 hours      PHYSICAL EXAM  Constitutional - NAD, Comfortable  HEENT - NCAT, EOMI  Neck - + trach   Chest - CTA bilaterally, No wheeze, No rhonchi, No crackles  Cardiovascular - RRR, S1S2, No murmurs  Abdomen - BS+, Soft, NTND  Extremities - No C/C/E, No calf tenderness   Neurologic Exam -                    Cognitive - follows commands      Communication - Fluent, No dysarthria, no aphasia     Cranial Nerves - CN 2-12 intact     Motor - LUE 3/5, LLE 2/5, RLE/RUE 4/5                        Sensory - Intact to LT     Reflexes - DTR Intact, No primitive reflexive     Balance -not tested   Psychiatric - Mood stable, Affect WNL    --------------------------------------------------------------------

## 2017-12-14 NOTE — PROGRESS NOTE ADULT - PROBLEM SELECTOR PLAN 2
-Trach to vent.  -Daily Lasix and bid as needed. Diamox today  -Ativan decreased to 0.5 every 8 hours prn  -Difficulty getting a breath at times. Tolerated short trial of CPAP. (20 minutes)

## 2017-12-14 NOTE — PROGRESS NOTE ADULT - SUBJECTIVE AND OBJECTIVE BOX
CHIEF COMPLAINT:    Interval Events:    REVIEW OF SYSTEMS:  Constitutional:   Eyes:  ENT:  CV:  Resp:  GI:  :  MSK:  Integumentary:  Neurological:  Psychiatric:  Endocrine:  Hematologic/Lymphatic:  Allergic/Immunologic:  [ ] All other systems negative  [ ] Unable to assess ROS because ________    OBJECTIVE:  ICU Vital Signs Last 24 Hrs  T(C): 37.9 (14 Dec 2017 10:02), Max: 38 (14 Dec 2017 05:21)  T(F): 100.3 (14 Dec 2017 10:02), Max: 100.4 (14 Dec 2017 05:21)  HR: 114 (14 Dec 2017 10:02) (110 - 120)  BP: 123/80 (14 Dec 2017 10:02) (123/80 - 146/97)  BP(mean): --  ABP: --  ABP(mean): --  RR: 16 (14 Dec 2017 10:02) (12 - 16)  SpO2: 100% (14 Dec 2017 10:02) (97% - 100%)    Mode: AC/ CMV (Assist Control/ Continuous Mandatory Ventilation), RR (machine): 12, TV (machine): 450, FiO2: 40, PEEP: 5, ITime: 1, MAP: 11, PIP: 39    CAPILLARY BLOOD GLUCOSE      POCT Blood Glucose.: 113 mg/dL (14 Dec 2017 06:17)      PHYSICAL EXAM:  General:   HEENT:   Lymph Nodes:  Neck:   Respiratory:   Cardiovascular:   Abdomen:   Extremities:   Skin:   Neurological:  Psychiatry:    HOSPITAL MEDICATIONS:  MEDICATIONS  (STANDING):  ALBUTerol    90 MICROgram(s) HFA Inhaler 4 Puff(s) Inhalation every 4 hours  buDESOnide 160 MICROgram(s)/formoterol 4.5 MICROgram(s) Inhaler 2 Puff(s) Inhalation two times a day  chlorhexidine 4% Liquid 1 Application(s) Topical daily  enoxaparin Injectable 40 milliGRAM(s) SubCutaneous daily  ferrous    sulfate Liquid 300 milliGRAM(s) Enteral Tube daily  folic acid 1 milliGRAM(s) Oral daily  furosemide   Injectable 40 milliGRAM(s) IV Push daily  ibuprofen  Suspension 400 milliGRAM(s) Oral once  insulin lispro (HumaLOG) corrective regimen sliding scale   SubCutaneous every 6 hours  ipratropium 17 MICROgram(s) HFA Inhaler 1 Puff(s) Inhalation every 6 hours  pantoprazole  Injectable 40 milliGRAM(s) IV Push daily  valproate sodium IVPB 500 milliGRAM(s) IV Intermittent every 12 hours  vancomycin    Solution 125 milliGRAM(s) Enteral Tube every 6 hours  voriconazole IVPB 300 milliGRAM(s) IV Intermittent every 12 hours    MEDICATIONS  (PRN):  acetaminophen    Suspension. 650 milliGRAM(s) Enteral Tube every 6 hours PRN Mild Pain to moderate (1 - 6)  acetaminophen   Tablet 650 milliGRAM(s) Oral every 6 hours PRN For Temp greater than 38 C (100.4 F)  LORazepam   Injectable 0.5 milliGRAM(s) IV Push every 8 hours PRN Anxiety      LABS:                        7.9    11.98 )-----------( 242      ( 14 Dec 2017 06:00 )             24.9     12-14    144  |  95<L>  |  11  ----------------------------<  122<H>  3.6   |  41<H>  |  0.64    Ca    8.3<L>      14 Dec 2017 06:00                MICROBIOLOGY:     RADIOLOGY:  [ ] Reviewed and interpreted by me    PULMONARY FUNCTION TESTS:    EKG: CHIEF COMPLAINT: Patient is a 47y old  Male who presents with a chief complaint of Patient is a 47y old  Male who presents with a chief complaint of hemoptysis (09 Nov 2017 00:32) (09 Dec 2017 16:29)    Interval Events: Tmax 100.4    REVIEW OF SYSTEMS:  Constitutional: Feels weak and tired  Eyes:  ENT:  CV: Denies CP  Resp: c/o intermittent SOB  GI: c/o diarrhea  :  MSK: c/o weakness  Integumentary:  Neurological:  Psychiatric:  Endocrine:  Hematologic/Lymphatic:  Allergic/Immunologic:  [x] All other systems negative  [ ] Unable to assess ROS because ________    OBJECTIVE:  ICU Vital Signs Last 24 Hrs  T(C): 37.9 (14 Dec 2017 10:02), Max: 38 (14 Dec 2017 05:21)  T(F): 100.3 (14 Dec 2017 10:02), Max: 100.4 (14 Dec 2017 05:21)  HR: 114 (14 Dec 2017 10:02) (110 - 120)  BP: 123/80 (14 Dec 2017 10:02) (123/80 - 146/97)  BP(mean): --  ABP: --  ABP(mean): --  RR: 16 (14 Dec 2017 10:02) (12 - 16)  SpO2: 100% (14 Dec 2017 10:02) (97% - 100%)    Mode: AC/ CMV (Assist Control/ Continuous Mandatory Ventilation), RR (machine): 12, TV (machine): 450, FiO2: 40, PEEP: 5, ITime: 1, MAP: 11, PIP: 39    CAPILLARY BLOOD GLUCOSE      POCT Blood Glucose.: 113 mg/dL (14 Dec 2017 06:17)      HOSPITAL MEDICATIONS:  MEDICATIONS  (STANDING):  ALBUTerol    90 MICROgram(s) HFA Inhaler 4 Puff(s) Inhalation every 4 hours  buDESOnide 160 MICROgram(s)/formoterol 4.5 MICROgram(s) Inhaler 2 Puff(s) Inhalation two times a day  chlorhexidine 4% Liquid 1 Application(s) Topical daily  enoxaparin Injectable 40 milliGRAM(s) SubCutaneous daily  ferrous    sulfate Liquid 300 milliGRAM(s) Enteral Tube daily  folic acid 1 milliGRAM(s) Oral daily  furosemide   Injectable 40 milliGRAM(s) IV Push daily  ibuprofen  Suspension 400 milliGRAM(s) Oral once  insulin lispro (HumaLOG) corrective regimen sliding scale   SubCutaneous every 6 hours  ipratropium 17 MICROgram(s) HFA Inhaler 1 Puff(s) Inhalation every 6 hours  pantoprazole  Injectable 40 milliGRAM(s) IV Push daily  valproate sodium IVPB 500 milliGRAM(s) IV Intermittent every 12 hours  vancomycin    Solution 125 milliGRAM(s) Enteral Tube every 6 hours  voriconazole IVPB 300 milliGRAM(s) IV Intermittent every 12 hours    MEDICATIONS  (PRN):  acetaminophen    Suspension. 650 milliGRAM(s) Enteral Tube every 6 hours PRN Mild Pain to moderate (1 - 6)  acetaminophen   Tablet 650 milliGRAM(s) Oral every 6 hours PRN For Temp greater than 38 C (100.4 F)  LORazepam   Injectable 0.5 milliGRAM(s) IV Push every 8 hours PRN Anxiety      LABS:                        7.9    11.98 )-----------( 242      ( 14 Dec 2017 06:00 )             24.9     12-14    144  |  95<L>  |  11  ----------------------------<  122<H>  3.6   |  41<H>  |  0.64    Ca    8.3<L>      14 Dec 2017 06:00                MICROBIOLOGY:     RADIOLOGY:  [ ] Reviewed and interpreted by me    PULMONARY FUNCTION TESTS:    EKG:

## 2017-12-14 NOTE — CHART NOTE - NSCHARTNOTEFT_GEN_A_CORE
MICU consulted s/p RRT for pt in RCU with hypoxia and low tidal volumes on ventilator.  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 Pt found to be in respiratory distress, tachypnea to 40s, diaphoretic, with little to no tidal volumes being delivered on /12/40%/5. Respiratory at bedside bagging patient.   Trach cuff deflated and  multiple attempts to re-inflate were unsuccessful. Attempted to tamponade the cuff leak with stop cock and clamp however pt still unable to tolerate vent. Decision made to transfer pt to MICU for trach exchange and airway stabilization. Pulm fellow made aware of plan, ENT notified in case emergency back up necessary.  Upon transfer, pt being actively bagged on 100% Fi02, /77, , 02sat 100%.

## 2017-12-14 NOTE — PROGRESS NOTE ADULT - ATTENDING COMMENTS
H/o sarcoidosis and CVA, low grade fevers despite treatment for c.diff diarrhea -- cultures pending  Severe weakness and deconditioning, working with PT H/o sarcoidosis and CVA, low grade fevers despite treatment for c.diff diarrhea -- cultures pending  COntraction alkalosis, will slow down diuresis  Severe weakness and deconditioning, working with PT

## 2017-12-15 LAB
BACTERIA BLD CULT: SIGNIFICANT CHANGE UP
BASE EXCESS BLDA CALC-SCNC: 13.4 MMOL/L — SIGNIFICANT CHANGE UP
BASE EXCESS BLDA CALC-SCNC: 15 MMOL/L — SIGNIFICANT CHANGE UP
BUN SERPL-MCNC: 12 MG/DL — SIGNIFICANT CHANGE UP (ref 7–23)
CALCIUM SERPL-MCNC: 8.7 MG/DL — SIGNIFICANT CHANGE UP (ref 8.4–10.5)
CHLORIDE BLDA-SCNC: 94 MMOL/L — LOW (ref 96–108)
CHLORIDE BLDA-SCNC: 95 MMOL/L — LOW (ref 96–108)
CHLORIDE SERPL-SCNC: 96 MMOL/L — LOW (ref 98–107)
CO2 SERPL-SCNC: 39 MMOL/L — HIGH (ref 22–31)
CREAT SERPL-MCNC: 0.64 MG/DL — SIGNIFICANT CHANGE UP (ref 0.5–1.3)
GLUCOSE BLDA-MCNC: 120 MG/DL — HIGH (ref 70–99)
GLUCOSE BLDA-MCNC: 138 MG/DL — HIGH (ref 70–99)
GLUCOSE BLDC GLUCOMTR-MCNC: 103 MG/DL — HIGH (ref 70–99)
GLUCOSE BLDC GLUCOMTR-MCNC: 103 MG/DL — HIGH (ref 70–99)
GLUCOSE BLDC GLUCOMTR-MCNC: 124 MG/DL — HIGH (ref 70–99)
GLUCOSE SERPL-MCNC: 125 MG/DL — HIGH (ref 70–99)
HCO3 BLDA-SCNC: 37 MMOL/L — HIGH (ref 22–26)
HCO3 BLDA-SCNC: 37 MMOL/L — HIGH (ref 22–26)
HCT VFR BLD CALC: 27.5 % — LOW (ref 39–50)
HCT VFR BLDA CALC: 28.1 % — LOW (ref 39–51)
HCT VFR BLDA CALC: 28.6 % — LOW (ref 39–51)
HGB BLD-MCNC: 8.4 G/DL — LOW (ref 13–17)
HGB BLDA-MCNC: 9.1 G/DL — LOW (ref 13–17)
HGB BLDA-MCNC: 9.2 G/DL — LOW (ref 13–17)
LACTATE BLDA-SCNC: 0.8 MMOL/L — SIGNIFICANT CHANGE UP (ref 0.5–2)
LACTATE BLDA-SCNC: 1.5 MMOL/L — SIGNIFICANT CHANGE UP (ref 0.5–2)
MCHC RBC-ENTMCNC: 29.5 PG — SIGNIFICANT CHANGE UP (ref 27–34)
MCHC RBC-ENTMCNC: 30.5 % — LOW (ref 32–36)
MCV RBC AUTO: 96.5 FL — SIGNIFICANT CHANGE UP (ref 80–100)
NRBC # FLD: 0 — SIGNIFICANT CHANGE UP
PCO2 BLDA: 57 MMHG — HIGH (ref 35–48)
PCO2 BLDA: 86 MMHG — CRITICAL HIGH (ref 35–48)
PH BLDA: 7.31 PH — LOW (ref 7.35–7.45)
PH BLDA: 7.44 PH — SIGNIFICANT CHANGE UP (ref 7.35–7.45)
PLATELET # BLD AUTO: 290 K/UL — SIGNIFICANT CHANGE UP (ref 150–400)
PMV BLD: 13.2 FL — HIGH (ref 7–13)
PO2 BLDA: 119 MMHG — HIGH (ref 83–108)
PO2 BLDA: 32 MMHG — CRITICAL LOW (ref 83–108)
POTASSIUM BLDA-SCNC: 3.4 MMOL/L — SIGNIFICANT CHANGE UP (ref 3.4–4.5)
POTASSIUM BLDA-SCNC: 3.6 MMOL/L — SIGNIFICANT CHANGE UP (ref 3.4–4.5)
POTASSIUM SERPL-MCNC: 3.6 MMOL/L — SIGNIFICANT CHANGE UP (ref 3.5–5.3)
POTASSIUM SERPL-SCNC: 3.6 MMOL/L — SIGNIFICANT CHANGE UP (ref 3.5–5.3)
RBC # BLD: 2.85 M/UL — LOW (ref 4.2–5.8)
RBC # FLD: 15.3 % — HIGH (ref 10.3–14.5)
SAO2 % BLDA: 50.1 % — LOW (ref 95–99)
SAO2 % BLDA: 99 % — SIGNIFICANT CHANGE UP (ref 95–99)
SODIUM BLDA-SCNC: 135 MMOL/L — LOW (ref 136–146)
SODIUM BLDA-SCNC: 136 MMOL/L — SIGNIFICANT CHANGE UP (ref 136–146)
SODIUM SERPL-SCNC: 143 MMOL/L — SIGNIFICANT CHANGE UP (ref 135–145)
SPECIMEN SOURCE: SIGNIFICANT CHANGE UP
SPECIMEN SOURCE: SIGNIFICANT CHANGE UP
WBC # BLD: 12.71 K/UL — HIGH (ref 3.8–10.5)
WBC # FLD AUTO: 12.71 K/UL — HIGH (ref 3.8–10.5)

## 2017-12-15 PROCEDURE — 99233 SBSQ HOSP IP/OBS HIGH 50: CPT | Mod: GC

## 2017-12-15 PROCEDURE — 93010 ELECTROCARDIOGRAM REPORT: CPT

## 2017-12-15 RX ORDER — ALBUTEROL 90 UG/1
4 AEROSOL, METERED ORAL EVERY 6 HOURS
Qty: 0 | Refills: 0 | Status: DISCONTINUED | OUTPATIENT
Start: 2017-12-15 | End: 2018-01-10

## 2017-12-15 RX ORDER — PIPERACILLIN AND TAZOBACTAM 4; .5 G/20ML; G/20ML
3.38 INJECTION, POWDER, LYOPHILIZED, FOR SOLUTION INTRAVENOUS ONCE
Qty: 0 | Refills: 0 | Status: COMPLETED | OUTPATIENT
Start: 2017-12-15 | End: 2017-12-15

## 2017-12-15 RX ORDER — FENTANYL CITRATE 50 UG/ML
1 INJECTION INTRAVENOUS
Qty: 0 | Refills: 0 | Status: DISCONTINUED | OUTPATIENT
Start: 2017-12-15 | End: 2017-12-21

## 2017-12-15 RX ORDER — ACETAZOLAMIDE 250 MG/1
250 TABLET ORAL ONCE
Qty: 0 | Refills: 0 | Status: COMPLETED | OUTPATIENT
Start: 2017-12-15 | End: 2017-12-15

## 2017-12-15 RX ORDER — SODIUM CHLORIDE 9 MG/ML
500 INJECTION INTRAMUSCULAR; INTRAVENOUS; SUBCUTANEOUS ONCE
Qty: 0 | Refills: 0 | Status: COMPLETED | OUTPATIENT
Start: 2017-12-15 | End: 2017-12-15

## 2017-12-15 RX ORDER — MIDODRINE HYDROCHLORIDE 2.5 MG/1
10 TABLET ORAL ONCE
Qty: 0 | Refills: 0 | Status: COMPLETED | OUTPATIENT
Start: 2017-12-15 | End: 2017-12-15

## 2017-12-15 RX ORDER — PIPERACILLIN AND TAZOBACTAM 4; .5 G/20ML; G/20ML
3.38 INJECTION, POWDER, LYOPHILIZED, FOR SOLUTION INTRAVENOUS EVERY 8 HOURS
Qty: 0 | Refills: 0 | Status: DISCONTINUED | OUTPATIENT
Start: 2017-12-15 | End: 2017-12-19

## 2017-12-15 RX ADMIN — SODIUM CHLORIDE 500 MILLILITER(S): 9 INJECTION INTRAMUSCULAR; INTRAVENOUS; SUBCUTANEOUS at 05:08

## 2017-12-15 RX ADMIN — ALBUTEROL 4 PUFF(S): 90 AEROSOL, METERED ORAL at 15:37

## 2017-12-15 RX ADMIN — CHLORHEXIDINE GLUCONATE 1 APPLICATION(S): 213 SOLUTION TOPICAL at 11:32

## 2017-12-15 RX ADMIN — MIDODRINE HYDROCHLORIDE 10 MILLIGRAM(S): 2.5 TABLET ORAL at 05:08

## 2017-12-15 RX ADMIN — ALBUTEROL 4 PUFF(S): 90 AEROSOL, METERED ORAL at 22:29

## 2017-12-15 RX ADMIN — Medication 125 MILLIGRAM(S): at 18:45

## 2017-12-15 RX ADMIN — Medication 27.5 MILLIGRAM(S): at 18:45

## 2017-12-15 RX ADMIN — BUDESONIDE AND FORMOTEROL FUMARATE DIHYDRATE 2 PUFF(S): 160; 4.5 AEROSOL RESPIRATORY (INHALATION) at 10:10

## 2017-12-15 RX ADMIN — ALBUTEROL 4 PUFF(S): 90 AEROSOL, METERED ORAL at 00:34

## 2017-12-15 RX ADMIN — Medication 300 MILLIGRAM(S): at 11:33

## 2017-12-15 RX ADMIN — ALBUTEROL 4 PUFF(S): 90 AEROSOL, METERED ORAL at 05:01

## 2017-12-15 RX ADMIN — PIPERACILLIN AND TAZOBACTAM 25 GRAM(S): 4; .5 INJECTION, POWDER, LYOPHILIZED, FOR SOLUTION INTRAVENOUS at 18:45

## 2017-12-15 RX ADMIN — ALBUTEROL 4 PUFF(S): 90 AEROSOL, METERED ORAL at 13:01

## 2017-12-15 RX ADMIN — Medication 650 MILLIGRAM(S): at 22:56

## 2017-12-15 RX ADMIN — ALBUTEROL 4 PUFF(S): 90 AEROSOL, METERED ORAL at 10:08

## 2017-12-15 RX ADMIN — VORICONAZOLE 125 MILLIGRAM(S): 10 INJECTION, POWDER, LYOPHILIZED, FOR SOLUTION INTRAVENOUS at 12:42

## 2017-12-15 RX ADMIN — PANTOPRAZOLE SODIUM 40 MILLIGRAM(S): 20 TABLET, DELAYED RELEASE ORAL at 11:33

## 2017-12-15 RX ADMIN — Medication 125 MILLIGRAM(S): at 05:08

## 2017-12-15 RX ADMIN — Medication 27.5 MILLIGRAM(S): at 05:08

## 2017-12-15 RX ADMIN — Medication 1 MILLIGRAM(S): at 11:33

## 2017-12-15 RX ADMIN — Medication 1 PUFF(S): at 10:10

## 2017-12-15 RX ADMIN — BUDESONIDE AND FORMOTEROL FUMARATE DIHYDRATE 2 PUFF(S): 160; 4.5 AEROSOL RESPIRATORY (INHALATION) at 22:29

## 2017-12-15 RX ADMIN — Medication 1 PUFF(S): at 22:29

## 2017-12-15 RX ADMIN — Medication 0.5 MILLIGRAM(S): at 19:54

## 2017-12-15 RX ADMIN — Medication 125 MILLIGRAM(S): at 11:33

## 2017-12-15 RX ADMIN — Medication 1 PUFF(S): at 15:38

## 2017-12-15 RX ADMIN — VORICONAZOLE 125 MILLIGRAM(S): 10 INJECTION, POWDER, LYOPHILIZED, FOR SOLUTION INTRAVENOUS at 22:56

## 2017-12-15 RX ADMIN — Medication 1 PUFF(S): at 05:01

## 2017-12-15 RX ADMIN — ENOXAPARIN SODIUM 40 MILLIGRAM(S): 100 INJECTION SUBCUTANEOUS at 11:32

## 2017-12-15 RX ADMIN — FENTANYL CITRATE 1 PATCH: 50 INJECTION INTRAVENOUS at 12:10

## 2017-12-15 RX ADMIN — FENTANYL CITRATE 1 PATCH: 50 INJECTION INTRAVENOUS at 12:41

## 2017-12-15 RX ADMIN — ACETAZOLAMIDE 250 MILLIGRAM(S): 250 TABLET ORAL at 12:06

## 2017-12-15 RX ADMIN — PIPERACILLIN AND TAZOBACTAM 200 GRAM(S): 4; .5 INJECTION, POWDER, LYOPHILIZED, FOR SOLUTION INTRAVENOUS at 11:32

## 2017-12-15 NOTE — CHART NOTE - NSCHARTNOTEFT_GEN_A_CORE
Patient was monitored in MICU for >6hours. Vital signs stable. Cuff on trach did not deflate. Patient briefly required low dose levo, no longer requiring pressors. Maintaining normal blood pressure. Appears well. Stable for transfer back to RCU. MICU recommends continued care as planned prior to trach change.

## 2017-12-15 NOTE — PROGRESS NOTE ADULT - ATTENDING COMMENTS
Had a brief stay in the MICU overnight for cuff leak when TVs started dropping last night -- tracheostomy was changed and he was transferred back to the RCU  Persistent diarrhea, on treatment for c.diff  More secretions, sputum cx from yesterday with GNR, patient had a low grade fever so zosyn was added  PS trials as tolerated

## 2017-12-15 NOTE — PROGRESS NOTE ADULT - SUBJECTIVE AND OBJECTIVE BOX
CHIEF COMPLAINT:    Interval Events:    REVIEW OF SYSTEMS:  Constitutional:   Eyes:  ENT:  CV:  Resp:  GI:  :  MSK:  Integumentary:  Neurological:  Psychiatric:  Endocrine:  Hematologic/Lymphatic:  Allergic/Immunologic:  [ ] All other systems negative  [ ] Unable to assess ROS because ________    OBJECTIVE:  ICU Vital Signs Last 24 Hrs  T(C): 36.5 (15 Dec 2017 07:53), Max: 37.9 (14 Dec 2017 10:02)  T(F): 97.7 (15 Dec 2017 07:53), Max: 100.3 (14 Dec 2017 10:02)  HR: 100 (15 Dec 2017 07:53) (94 - 118)  BP: 107/72 (15 Dec 2017 07:53) (70/49 - 162/90)  BP(mean): 75 (15 Dec 2017 06:00) (52 - 102)  ABP: --  ABP(mean): --  RR: 15 (15 Dec 2017 07:53) (10 - 26)  SpO2: 100% (15 Dec 2017 07:53) (94% - 100%)    Mode: AC/ CMV (Assist Control/ Continuous Mandatory Ventilation), RR (machine): 12, TV (machine): 450, FiO2: 40, PEEP: 5, MAP: 10, PIP: 36    12-14 @ 07:01  -  12-15 @ 07:00  --------------------------------------------------------  IN: 1141.9 mL / OUT: 500 mL / NET: 641.9 mL      CAPILLARY BLOOD GLUCOSE      POCT Blood Glucose.: 103 mg/dL (15 Dec 2017 05:06)        HOSPITAL MEDICATIONS:  MEDICATIONS  (STANDING):  ALBUTerol    90 MICROgram(s) HFA Inhaler 4 Puff(s) Inhalation every 4 hours  buDESOnide 160 MICROgram(s)/formoterol 4.5 MICROgram(s) Inhaler 2 Puff(s) Inhalation two times a day  chlorhexidine 4% Liquid 1 Application(s) Topical daily  enoxaparin Injectable 40 milliGRAM(s) SubCutaneous daily  ferrous    sulfate Liquid 300 milliGRAM(s) Enteral Tube daily  folic acid 1 milliGRAM(s) Oral daily  furosemide   Injectable 40 milliGRAM(s) IV Push daily  insulin lispro (HumaLOG) corrective regimen sliding scale   SubCutaneous every 6 hours  ipratropium 17 MICROgram(s) HFA Inhaler 1 Puff(s) Inhalation every 6 hours  pantoprazole  Injectable 40 milliGRAM(s) IV Push daily  valproate sodium IVPB 500 milliGRAM(s) IV Intermittent every 12 hours  vancomycin    Solution 125 milliGRAM(s) Enteral Tube every 6 hours  voriconazole IVPB 300 milliGRAM(s) IV Intermittent every 12 hours    MEDICATIONS  (PRN):  acetaminophen    Suspension. 650 milliGRAM(s) Enteral Tube every 6 hours PRN Mild Pain to moderate (1 - 6)  acetaminophen   Tablet 650 milliGRAM(s) Oral every 6 hours PRN For Temp greater than 38 C (100.4 F)  LORazepam   Injectable 0.5 milliGRAM(s) IV Push every 8 hours PRN Anxiety      LABS:                        8.4    12.71 )-----------( 290      ( 15 Dec 2017 03:40 )             27.5     12-15    143  |  96<L>  |  12  ----------------------------<  125<H>  3.6   |  39<H>  |  0.64    Ca    8.7      15 Dec 2017 03:40        Urinalysis Basic - ( 14 Dec 2017 11:40 )    Color: YELLOW / Appearance: CLEAR / S.025 / pH: 8.5  Gluc: NEGATIVE / Ketone: NEGATIVE  / Bili: NEGATIVE / Urobili: NORMAL mg/dL   Blood: NEGATIVE / Protein: 150 mg/dL / Nitrite: NEGATIVE   Leuk Esterase: NEGATIVE / RBC: 0-2 / WBC 2-5   Sq Epi: OCC / Non Sq Epi: x / Bacteria: x      Arterial Blood Gas:  12-15 @ 00:45  7.44/57/119/37/99.0/13.4  ABG lactate: 0.8  Arterial Blood Gas:   @ 23:51  7.31/86/32/37/50.1/15.0  ABG lactate: 1.5        MICROBIOLOGY:     RADIOLOGY:  [ ] Reviewed and interpreted by me    PULMONARY FUNCTION TESTS:    EKG: CHIEF COMPLAINT: feels tired     Interval Events: s/p trach exchange overnight due to cuff leak    REVIEW OF SYSTEMS:  CV: denies  Resp: denies  [x] All other systems negative  [ ] Unable to assess ROS because ________    OBJECTIVE:  ICU Vital Signs Last 24 Hrs  T(C): 36.5 (15 Dec 2017 07:53), Max: 37.9 (14 Dec 2017 10:02)  T(F): 97.7 (15 Dec 2017 07:53), Max: 100.3 (14 Dec 2017 10:02)  HR: 100 (15 Dec 2017 07:53) (94 - 118)  BP: 107/72 (15 Dec 2017 07:53) (70/49 - 162/90)  BP(mean): 75 (15 Dec 2017 06:00) (52 - 102)  RR: 15 (15 Dec 2017 07:53) (10 - 26)  SpO2: 100% (15 Dec 2017 07:53) (94% - 100%)    Mode: AC/ CMV (Assist Control/ Continuous Mandatory Ventilation), RR (machine): 12, TV (machine): 450, FiO2: 40, PEEP: 5, MAP: 10, PIP: 36    POCT Blood Glucose.: 103 mg/dL (15 Dec 2017 05:06)        HOSPITAL MEDICATIONS:  MEDICATIONS  (STANDING):  ALBUTerol    90 MICROgram(s) HFA Inhaler 4 Puff(s) Inhalation every 4 hours  buDESOnide 160 MICROgram(s)/formoterol 4.5 MICROgram(s) Inhaler 2 Puff(s) Inhalation two times a day  chlorhexidine 4% Liquid 1 Application(s) Topical daily  enoxaparin Injectable 40 milliGRAM(s) SubCutaneous daily  ferrous    sulfate Liquid 300 milliGRAM(s) Enteral Tube daily  folic acid 1 milliGRAM(s) Oral daily  furosemide   Injectable 40 milliGRAM(s) IV Push daily  insulin lispro (HumaLOG) corrective regimen sliding scale   SubCutaneous every 6 hours  ipratropium 17 MICROgram(s) HFA Inhaler 1 Puff(s) Inhalation every 6 hours  pantoprazole  Injectable 40 milliGRAM(s) IV Push daily  valproate sodium IVPB 500 milliGRAM(s) IV Intermittent every 12 hours  vancomycin    Solution 125 milliGRAM(s) Enteral Tube every 6 hours  voriconazole IVPB 300 milliGRAM(s) IV Intermittent every 12 hours    MEDICATIONS  (PRN):  acetaminophen    Suspension. 650 milliGRAM(s) Enteral Tube every 6 hours PRN Mild Pain to moderate (1 - 6)  acetaminophen   Tablet 650 milliGRAM(s) Oral every 6 hours PRN For Temp greater than 38 C (100.4 F)  LORazepam   Injectable 0.5 milliGRAM(s) IV Push every 8 hours PRN Anxiety      LABS:                        8.4    12.71 )-----------( 290      ( 15 Dec 2017 03:40 )             27.5     12-15    143  |  96<L>  |  12  ----------------------------<  125<H>  3.6   |  39<H>  |  0.64    Ca    8.7      15 Dec 2017 03:40        Urinalysis Basic - ( 14 Dec 2017 11:40 )    Color: YELLOW / Appearance: CLEAR / S.025 / pH: 8.5  Gluc: NEGATIVE / Ketone: NEGATIVE  / Bili: NEGATIVE / Urobili: NORMAL mg/dL   Blood: NEGATIVE / Protein: 150 mg/dL / Nitrite: NEGATIVE   Leuk Esterase: NEGATIVE / RBC: 0-2 / WBC 2-5   Sq Epi: OCC / Non Sq Epi: x / Bacteria: x      Arterial Blood Gas:  12-15 @ 00:45  7.44/57/119/37/99.0/13.4  ABG lactate: 0.8  Arterial Blood Gas:   @ 23:51  7.31/86/32/37/50.1/15.0  ABG lactate: 1.5        MICROBIOLOGY: Gram Stain Sputum:   GNDC^Gram Negative Diplococci  QUANTITY OF BACTERIA SEEN: FEW (2+)  WBC^White Blood Cells  QNTY CELLS IN GRAM STAIN: FEW (2+) (.17 @ 10:39)

## 2017-12-15 NOTE — PROGRESS NOTE ADULT - PROBLEM SELECTOR PLAN 1
On voriconazole~  ID following  Pt having loose stool, foul watery- c- diff +   po vanco  flexiseal On voriconazole~  ID following  Pt having loose stool, foul watery- c- diff +   po vanco  flexiseal  sputum cx positive for gram neg diplococcus, started on Zosyn

## 2017-12-15 NOTE — CHART NOTE - NSCHARTNOTEFT_GEN_A_CORE
Patient remains intubated and continues to be followed RCU. Palliative to sign off at this time. Please reconsult as needed.

## 2017-12-15 NOTE — PROGRESS NOTE ADULT - PROBLEM SELECTOR PLAN 2
-Trach to vent.  -Daily Lasix and bid as needed. Diamox today  -Ativan decreased to 0.5 every 8 hours prn  -Difficulty getting a breath at times. Tolerated short trial of CPAP. (20 minutes) -Trach to vent.  -Daily Lasix and bid as needed. Diamox today  -Ativan decreased to 0.5 every 8 hours prn

## 2017-12-15 NOTE — PROGRESS NOTE ADULT - ASSESSMENT
47 year old male with hemoptysis : with multiple comorbidities: Has been on eliquis for hx of cva and has PFO. 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 Currently on mechanical ventilation. Required trach exchange on 12/15 for cuff leak. Being treated for c.diff.

## 2017-12-16 LAB
BUN SERPL-MCNC: 10 MG/DL — SIGNIFICANT CHANGE UP (ref 7–23)
CALCIUM SERPL-MCNC: 8.8 MG/DL — SIGNIFICANT CHANGE UP (ref 8.4–10.5)
CHLORIDE SERPL-SCNC: 96 MMOL/L — LOW (ref 98–107)
CO2 SERPL-SCNC: 35 MMOL/L — HIGH (ref 22–31)
CREAT SERPL-MCNC: 0.74 MG/DL — SIGNIFICANT CHANGE UP (ref 0.5–1.3)
GLUCOSE BLDC GLUCOMTR-MCNC: 102 MG/DL — HIGH (ref 70–99)
GLUCOSE BLDC GLUCOMTR-MCNC: 110 MG/DL — HIGH (ref 70–99)
GLUCOSE BLDC GLUCOMTR-MCNC: 111 MG/DL — HIGH (ref 70–99)
GLUCOSE BLDC GLUCOMTR-MCNC: 120 MG/DL — HIGH (ref 70–99)
GLUCOSE BLDC GLUCOMTR-MCNC: 130 MG/DL — HIGH (ref 70–99)
GLUCOSE SERPL-MCNC: 110 MG/DL — HIGH (ref 70–99)
GRAM STN SPT: SIGNIFICANT CHANGE UP
HCT VFR BLD CALC: 30.2 % — LOW (ref 39–50)
HGB BLD-MCNC: 9.1 G/DL — LOW (ref 13–17)
MAGNESIUM SERPL-MCNC: 1.9 MG/DL — SIGNIFICANT CHANGE UP (ref 1.6–2.6)
MCHC RBC-ENTMCNC: 29.1 PG — SIGNIFICANT CHANGE UP (ref 27–34)
MCHC RBC-ENTMCNC: 30.1 % — LOW (ref 32–36)
MCV RBC AUTO: 96.5 FL — SIGNIFICANT CHANGE UP (ref 80–100)
METHOD TYPE: SIGNIFICANT CHANGE UP
NRBC # FLD: 0 — SIGNIFICANT CHANGE UP
ORGANISM # SPEC MICROSCOPIC CNT: SIGNIFICANT CHANGE UP
PHOSPHATE SERPL-MCNC: 4.3 MG/DL — SIGNIFICANT CHANGE UP (ref 2.5–4.5)
PLATELET # BLD AUTO: 267 K/UL — SIGNIFICANT CHANGE UP (ref 150–400)
PMV BLD: 12.8 FL — SIGNIFICANT CHANGE UP (ref 7–13)
POTASSIUM SERPL-MCNC: 3.8 MMOL/L — SIGNIFICANT CHANGE UP (ref 3.5–5.3)
POTASSIUM SERPL-SCNC: 3.8 MMOL/L — SIGNIFICANT CHANGE UP (ref 3.5–5.3)
RBC # BLD: 3.13 M/UL — LOW (ref 4.2–5.8)
RBC # FLD: 15.5 % — HIGH (ref 10.3–14.5)
SODIUM SERPL-SCNC: 139 MMOL/L — SIGNIFICANT CHANGE UP (ref 135–145)
WBC # BLD: 13.25 K/UL — HIGH (ref 3.8–10.5)
WBC # FLD AUTO: 13.25 K/UL — HIGH (ref 3.8–10.5)

## 2017-12-16 PROCEDURE — 99291 CRITICAL CARE FIRST HOUR: CPT | Mod: GC

## 2017-12-16 PROCEDURE — 99233 SBSQ HOSP IP/OBS HIGH 50: CPT

## 2017-12-16 RX ORDER — VALPROIC ACID (AS SODIUM SALT) 250 MG/5ML
500 SOLUTION, ORAL ORAL EVERY 12 HOURS
Qty: 0 | Refills: 0 | Status: DISCONTINUED | OUTPATIENT
Start: 2017-12-16 | End: 2018-01-03

## 2017-12-16 RX ORDER — ACETAMINOPHEN 500 MG
650 TABLET ORAL ONCE
Qty: 0 | Refills: 0 | Status: COMPLETED | OUTPATIENT
Start: 2017-12-16 | End: 2017-12-16

## 2017-12-16 RX ADMIN — BUDESONIDE AND FORMOTEROL FUMARATE DIHYDRATE 2 PUFF(S): 160; 4.5 AEROSOL RESPIRATORY (INHALATION) at 10:29

## 2017-12-16 RX ADMIN — Medication 650 MILLIGRAM(S): at 03:38

## 2017-12-16 RX ADMIN — ENOXAPARIN SODIUM 40 MILLIGRAM(S): 100 INJECTION SUBCUTANEOUS at 11:38

## 2017-12-16 RX ADMIN — PIPERACILLIN AND TAZOBACTAM 25 GRAM(S): 4; .5 INJECTION, POWDER, LYOPHILIZED, FOR SOLUTION INTRAVENOUS at 11:37

## 2017-12-16 RX ADMIN — Medication 1 PUFF(S): at 21:15

## 2017-12-16 RX ADMIN — Medication 125 MILLIGRAM(S): at 00:35

## 2017-12-16 RX ADMIN — Medication 1 PUFF(S): at 04:35

## 2017-12-16 RX ADMIN — Medication 1 MILLIGRAM(S): at 11:38

## 2017-12-16 RX ADMIN — Medication 0.5 MILLIGRAM(S): at 20:14

## 2017-12-16 RX ADMIN — ALBUTEROL 4 PUFF(S): 90 AEROSOL, METERED ORAL at 10:30

## 2017-12-16 RX ADMIN — Medication 125 MILLIGRAM(S): at 18:47

## 2017-12-16 RX ADMIN — Medication 125 MILLIGRAM(S): at 11:38

## 2017-12-16 RX ADMIN — VORICONAZOLE 125 MILLIGRAM(S): 10 INJECTION, POWDER, LYOPHILIZED, FOR SOLUTION INTRAVENOUS at 11:37

## 2017-12-16 RX ADMIN — BUDESONIDE AND FORMOTEROL FUMARATE DIHYDRATE 2 PUFF(S): 160; 4.5 AEROSOL RESPIRATORY (INHALATION) at 21:15

## 2017-12-16 RX ADMIN — Medication 1 PUFF(S): at 10:29

## 2017-12-16 RX ADMIN — ALBUTEROL 4 PUFF(S): 90 AEROSOL, METERED ORAL at 04:35

## 2017-12-16 RX ADMIN — PANTOPRAZOLE SODIUM 40 MILLIGRAM(S): 20 TABLET, DELAYED RELEASE ORAL at 11:37

## 2017-12-16 RX ADMIN — Medication 650 MILLIGRAM(S): at 04:10

## 2017-12-16 RX ADMIN — Medication 27.5 MILLIGRAM(S): at 05:15

## 2017-12-16 RX ADMIN — Medication 1 PUFF(S): at 16:52

## 2017-12-16 RX ADMIN — ALBUTEROL 4 PUFF(S): 90 AEROSOL, METERED ORAL at 16:52

## 2017-12-16 RX ADMIN — Medication 27.5 MILLIGRAM(S): at 17:45

## 2017-12-16 RX ADMIN — Medication 40 MILLIGRAM(S): at 05:15

## 2017-12-16 RX ADMIN — Medication 300 MILLIGRAM(S): at 11:37

## 2017-12-16 RX ADMIN — VORICONAZOLE 125 MILLIGRAM(S): 10 INJECTION, POWDER, LYOPHILIZED, FOR SOLUTION INTRAVENOUS at 22:28

## 2017-12-16 RX ADMIN — Medication 125 MILLIGRAM(S): at 05:15

## 2017-12-16 RX ADMIN — PIPERACILLIN AND TAZOBACTAM 25 GRAM(S): 4; .5 INJECTION, POWDER, LYOPHILIZED, FOR SOLUTION INTRAVENOUS at 02:53

## 2017-12-16 RX ADMIN — PIPERACILLIN AND TAZOBACTAM 25 GRAM(S): 4; .5 INJECTION, POWDER, LYOPHILIZED, FOR SOLUTION INTRAVENOUS at 18:47

## 2017-12-16 RX ADMIN — CHLORHEXIDINE GLUCONATE 1 APPLICATION(S): 213 SOLUTION TOPICAL at 11:36

## 2017-12-16 RX ADMIN — ALBUTEROL 4 PUFF(S): 90 AEROSOL, METERED ORAL at 21:15

## 2017-12-16 NOTE — PROVIDER CONTACT NOTE (OTHER) - REASON
Patient rectal temp. 100.7 Reviewed PDMP report, last refill dispensed on 6/16/17.    Forwarding to MD for approval.   Patient rectal temp. 100.1

## 2017-12-16 NOTE — PROGRESS NOTE ADULT - SUBJECTIVE AND OBJECTIVE BOX
CHIEF COMPLAINT:    Interval Events:    REVIEW OF SYSTEMS:  Constitutional:   Eyes:  ENT:  CV:  Resp:  GI:  :  MSK:  Integumentary:  Neurological:  Psychiatric:  Endocrine:  Hematologic/Lymphatic:  Allergic/Immunologic:  [ ] All other systems negative  [ ] Unable to assess ROS because ________    OBJECTIVE:  ICU Vital Signs Last 24 Hrs  T(C): 37.2 (16 Dec 2017 05:10), Max: 38.2 (15 Dec 2017 22:48)  T(F): 99 (16 Dec 2017 05:10), Max: 100.7 (15 Dec 2017 22:48)  HR: 115 (16 Dec 2017 07:16) (97 - 121)  BP: 142/82 (16 Dec 2017 05:10) (93/58 - 152/88)  BP(mean): --  ABP: --  ABP(mean): --  RR: 13 (16 Dec 2017 05:10) (13 - 17)  SpO2: 99% (16 Dec 2017 07:16) (97% - 100%)    Mode: AC/ CMV (Assist Control/ Continuous Mandatory Ventilation), RR (machine): 12, TV (machine): 450, FiO2: 40, PEEP: 5, MAP: 13, PIP: 40    CAPILLARY BLOOD GLUCOSE      POCT Blood Glucose.: 120 mg/dL (16 Dec 2017 06:26)        HOSPITAL MEDICATIONS:  MEDICATIONS  (STANDING):  ALBUTerol    90 MICROgram(s) HFA Inhaler 4 Puff(s) Inhalation every 6 hours  buDESOnide 160 MICROgram(s)/formoterol 4.5 MICROgram(s) Inhaler 2 Puff(s) Inhalation two times a day  chlorhexidine 4% Liquid 1 Application(s) Topical daily  enoxaparin Injectable 40 milliGRAM(s) SubCutaneous daily  fentaNYL   Patch  50 MICROgram(s)/Hr 1 Patch Transdermal every 72 hours  ferrous    sulfate Liquid 300 milliGRAM(s) Enteral Tube daily  folic acid 1 milliGRAM(s) Oral daily  furosemide   Injectable 40 milliGRAM(s) IV Push daily  insulin lispro (HumaLOG) corrective regimen sliding scale   SubCutaneous every 6 hours  ipratropium 17 MICROgram(s) HFA Inhaler 1 Puff(s) Inhalation every 6 hours  pantoprazole  Injectable 40 milliGRAM(s) IV Push daily  piperacillin/tazobactam IVPB. 3.375 Gram(s) IV Intermittent every 8 hours  valproate sodium IVPB 500 milliGRAM(s) IV Intermittent every 12 hours  vancomycin    Solution 125 milliGRAM(s) Enteral Tube every 6 hours  voriconazole IVPB 300 milliGRAM(s) IV Intermittent every 12 hours    MEDICATIONS  (PRN):  acetaminophen    Suspension. 650 milliGRAM(s) Enteral Tube every 6 hours PRN Mild Pain to moderate (1 - 6)  acetaminophen   Tablet 650 milliGRAM(s) Oral every 6 hours PRN For Temp greater than 38 C (100.4 F)  LORazepam   Injectable 0.5 milliGRAM(s) IV Push every 8 hours PRN Anxiety      LABS:                        8.4    12.71 )-----------( 290      ( 15 Dec 2017 03:40 )             27.5     12-15    143  |  96<L>  |  12  ----------------------------<  125<H>  3.6   |  39<H>  |  0.64    Ca    8.7      15 Dec 2017 03:40        Urinalysis Basic - ( 14 Dec 2017 11:40 )    Color: YELLOW / Appearance: CLEAR / S.025 / pH: 8.5  Gluc: NEGATIVE / Ketone: NEGATIVE  / Bili: NEGATIVE / Urobili: NORMAL mg/dL   Blood: NEGATIVE / Protein: 150 mg/dL / Nitrite: NEGATIVE   Leuk Esterase: NEGATIVE / RBC: 0-2 / WBC 2-5   Sq Epi: OCC / Non Sq Epi: x / Bacteria: x      Arterial Blood Gas:  12-15 @ 00:45  7.44/57/119/37/99.0/13.4  ABG lactate: 0.8  Arterial Blood Gas:   @ 23:51  7.31/86/32/37/50.1/15.0  ABG lactate: 1.5        MICROBIOLOGY:     RADIOLOGY:  [ ] Reviewed and interpreted by me    PULMONARY FUNCTION TESTS:    EKG: CHIEF COMPLAINT: no complaints     Interval Events: fever overnight    REVIEW OF SYSTEMS:  Constitutional: denies  CV: denies  Resp: denies  [x] All other systems negative    OBJECTIVE:  ICU Vital Signs Last 24 Hrs  T(C): 37.2 (16 Dec 2017 05:10), Max: 38.2 (15 Dec 2017 22:48)  T(F): 99 (16 Dec 2017 05:10), Max: 100.7 (15 Dec 2017 22:48)  HR: 115 (16 Dec 2017 07:16) (97 - 121)  BP: 142/82 (16 Dec 2017 05:10) (93/58 - 152/88)  RR: 13 (16 Dec 2017 05:10) (13 - 17)  SpO2: 99% (16 Dec 2017 07:16) (97% - 100%)    Mode: AC/ CMV (Assist Control/ Continuous Mandatory Ventilation), RR (machine): 12, TV (machine): 450, FiO2: 40, PEEP: 5, MAP: 13, PIP: 40        POCT Blood Glucose.: 120 mg/dL (16 Dec 2017 06:26)        HOSPITAL MEDICATIONS:  MEDICATIONS  (STANDING):  ALBUTerol    90 MICROgram(s) HFA Inhaler 4 Puff(s) Inhalation every 6 hours  buDESOnide 160 MICROgram(s)/formoterol 4.5 MICROgram(s) Inhaler 2 Puff(s) Inhalation two times a day  chlorhexidine 4% Liquid 1 Application(s) Topical daily  enoxaparin Injectable 40 milliGRAM(s) SubCutaneous daily  fentaNYL   Patch  50 MICROgram(s)/Hr 1 Patch Transdermal every 72 hours  ferrous    sulfate Liquid 300 milliGRAM(s) Enteral Tube daily  folic acid 1 milliGRAM(s) Oral daily  furosemide   Injectable 40 milliGRAM(s) IV Push daily  insulin lispro (HumaLOG) corrective regimen sliding scale   SubCutaneous every 6 hours  ipratropium 17 MICROgram(s) HFA Inhaler 1 Puff(s) Inhalation every 6 hours  pantoprazole  Injectable 40 milliGRAM(s) IV Push daily  piperacillin/tazobactam IVPB. 3.375 Gram(s) IV Intermittent every 8 hours  valproate sodium IVPB 500 milliGRAM(s) IV Intermittent every 12 hours  vancomycin    Solution 125 milliGRAM(s) Enteral Tube every 6 hours  voriconazole IVPB 300 milliGRAM(s) IV Intermittent every 12 hours    MEDICATIONS  (PRN):  acetaminophen    Suspension. 650 milliGRAM(s) Enteral Tube every 6 hours PRN Mild Pain to moderate (1 - 6)  acetaminophen   Tablet 650 milliGRAM(s) Oral every 6 hours PRN For Temp greater than 38 C (100.4 F)  LORazepam   Injectable 0.5 milliGRAM(s) IV Push every 8 hours PRN Anxiety      LABS:             12-16    139  |  96<L>  |  10  ----------------------------<  110<H>  3.8   |  35<H>  |  0.74    Ca    8.8      16 Dec 2017 07:30  Phos  4.3     12-16  Mg     1.9     12-16                          9.1    13.25 )-----------( 267      ( 16 Dec 2017 07:30 )             30.2       Arterial Blood Gas:  12-15 @ 00:45  7.44/57/119/37/99.0/13.4  ABG lactate: 0.8    MICROBIOLOGY: tracheal aspirate + gram neg diplococci

## 2017-12-16 NOTE — PROGRESS NOTE ADULT - SUBJECTIVE AND OBJECTIVE BOX
Pt underwent trach exchange on 12/14 2/2 leaking. Today trach w/o leak, good TV and sats, cuff inflated and intact, Peak pressures hi 30's low 40's has been baseline.    Cont trach care, and management per primary team

## 2017-12-16 NOTE — PROGRESS NOTE ADULT - ASSESSMENT
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 Currently on mechanical ventilation. Required trach exchange on 12/15 for cuff leak. Being treated for c.diff. 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 Currently on mechanical ventilation. Required trach exchange on 12/15 for cuff leak. Being treated for c.diff as well as for gram neg diplococci in tracheal aspirate

## 2017-12-16 NOTE — PROGRESS NOTE ADULT - ATTENDING COMMENTS
Low grade fevers overnight, tracheal secretions  Sputum culture with 12/14 with GNR and GN diplococcus -- He was started on Zosyn 12/15  C.diff diarrhea on po vancomycin

## 2017-12-16 NOTE — PROGRESS NOTE ADULT - PROBLEM SELECTOR PLAN 1
On voriconazole~  ID following  Pt having loose stool, foul watery- c- diff +   po vanco  flexiseal  sputum cx positive for gram neg diplococcus, started on Zosyn On voriconazole (h/o aspergillosis with sarcoidosis)  Continues on zosyn for tracheal aspirate with gram neg diplococci  PO vanco for c.diff / flexiseal  f/u blood cultures

## 2017-12-16 NOTE — PROGRESS NOTE ADULT - PROBLEM SELECTOR PLAN 2
-Trach to vent.  -Daily Lasix and bid as needed. Diamox today  -Ativan decreased to 0.5 every 8 hours prn -Trach to vent.  -Daily Lasix and bid as needed.   - weaning trials as tolerated

## 2017-12-17 LAB
BUN SERPL-MCNC: 9 MG/DL — SIGNIFICANT CHANGE UP (ref 7–23)
CALCIUM SERPL-MCNC: 8.7 MG/DL — SIGNIFICANT CHANGE UP (ref 8.4–10.5)
CHLORIDE SERPL-SCNC: 94 MMOL/L — LOW (ref 98–107)
CO2 SERPL-SCNC: 38 MMOL/L — HIGH (ref 22–31)
CREAT SERPL-MCNC: 0.69 MG/DL — SIGNIFICANT CHANGE UP (ref 0.5–1.3)
GLUCOSE BLDC GLUCOMTR-MCNC: 110 MG/DL — HIGH (ref 70–99)
GLUCOSE BLDC GLUCOMTR-MCNC: 111 MG/DL — HIGH (ref 70–99)
GLUCOSE BLDC GLUCOMTR-MCNC: 115 MG/DL — HIGH (ref 70–99)
GLUCOSE BLDC GLUCOMTR-MCNC: 117 MG/DL — HIGH (ref 70–99)
GLUCOSE BLDC GLUCOMTR-MCNC: 121 MG/DL — HIGH (ref 70–99)
GLUCOSE SERPL-MCNC: 118 MG/DL — HIGH (ref 70–99)
HCT VFR BLD CALC: 25.4 % — LOW (ref 39–50)
HGB BLD-MCNC: 7.8 G/DL — LOW (ref 13–17)
MAGNESIUM SERPL-MCNC: 1.8 MG/DL — SIGNIFICANT CHANGE UP (ref 1.6–2.6)
MCHC RBC-ENTMCNC: 29.4 PG — SIGNIFICANT CHANGE UP (ref 27–34)
MCHC RBC-ENTMCNC: 30.7 % — LOW (ref 32–36)
MCV RBC AUTO: 95.8 FL — SIGNIFICANT CHANGE UP (ref 80–100)
NRBC # FLD: 0 — SIGNIFICANT CHANGE UP
ORGANISM # SPEC MICROSCOPIC CNT: SIGNIFICANT CHANGE UP
ORGANISM # SPEC MICROSCOPIC CNT: SIGNIFICANT CHANGE UP
PHOSPHATE SERPL-MCNC: 3.3 MG/DL — SIGNIFICANT CHANGE UP (ref 2.5–4.5)
PLATELET # BLD AUTO: 271 K/UL — SIGNIFICANT CHANGE UP (ref 150–400)
PMV BLD: 13.4 FL — HIGH (ref 7–13)
POTASSIUM SERPL-MCNC: 3.4 MMOL/L — LOW (ref 3.5–5.3)
POTASSIUM SERPL-SCNC: 3.4 MMOL/L — LOW (ref 3.5–5.3)
RBC # BLD: 2.65 M/UL — LOW (ref 4.2–5.8)
RBC # FLD: 15.7 % — HIGH (ref 10.3–14.5)
SODIUM SERPL-SCNC: 140 MMOL/L — SIGNIFICANT CHANGE UP (ref 135–145)
WBC # BLD: 11.48 K/UL — HIGH (ref 3.8–10.5)
WBC # FLD AUTO: 11.48 K/UL — HIGH (ref 3.8–10.5)

## 2017-12-17 PROCEDURE — 99233 SBSQ HOSP IP/OBS HIGH 50: CPT

## 2017-12-17 RX ORDER — POTASSIUM CHLORIDE 20 MEQ
40 PACKET (EA) ORAL ONCE
Qty: 0 | Refills: 0 | Status: COMPLETED | OUTPATIENT
Start: 2017-12-17 | End: 2017-12-17

## 2017-12-17 RX ADMIN — PIPERACILLIN AND TAZOBACTAM 25 GRAM(S): 4; .5 INJECTION, POWDER, LYOPHILIZED, FOR SOLUTION INTRAVENOUS at 18:25

## 2017-12-17 RX ADMIN — PIPERACILLIN AND TAZOBACTAM 25 GRAM(S): 4; .5 INJECTION, POWDER, LYOPHILIZED, FOR SOLUTION INTRAVENOUS at 02:14

## 2017-12-17 RX ADMIN — Medication 1 PUFF(S): at 03:19

## 2017-12-17 RX ADMIN — ALBUTEROL 4 PUFF(S): 90 AEROSOL, METERED ORAL at 03:17

## 2017-12-17 RX ADMIN — Medication 125 MILLIGRAM(S): at 11:49

## 2017-12-17 RX ADMIN — Medication 40 MILLIEQUIVALENT(S): at 10:03

## 2017-12-17 RX ADMIN — CHLORHEXIDINE GLUCONATE 1 APPLICATION(S): 213 SOLUTION TOPICAL at 11:43

## 2017-12-17 RX ADMIN — ALBUTEROL 4 PUFF(S): 90 AEROSOL, METERED ORAL at 16:20

## 2017-12-17 RX ADMIN — Medication 500 MILLIGRAM(S): at 17:48

## 2017-12-17 RX ADMIN — Medication 0.5 MILLIGRAM(S): at 19:51

## 2017-12-17 RX ADMIN — PIPERACILLIN AND TAZOBACTAM 25 GRAM(S): 4; .5 INJECTION, POWDER, LYOPHILIZED, FOR SOLUTION INTRAVENOUS at 11:48

## 2017-12-17 RX ADMIN — Medication 125 MILLIGRAM(S): at 00:22

## 2017-12-17 RX ADMIN — PANTOPRAZOLE SODIUM 40 MILLIGRAM(S): 20 TABLET, DELAYED RELEASE ORAL at 11:48

## 2017-12-17 RX ADMIN — BUDESONIDE AND FORMOTEROL FUMARATE DIHYDRATE 2 PUFF(S): 160; 4.5 AEROSOL RESPIRATORY (INHALATION) at 21:47

## 2017-12-17 RX ADMIN — Medication 125 MILLIGRAM(S): at 05:18

## 2017-12-17 RX ADMIN — Medication 1 MILLIGRAM(S): at 11:49

## 2017-12-17 RX ADMIN — Medication 125 MILLIGRAM(S): at 17:48

## 2017-12-17 RX ADMIN — BUDESONIDE AND FORMOTEROL FUMARATE DIHYDRATE 2 PUFF(S): 160; 4.5 AEROSOL RESPIRATORY (INHALATION) at 10:38

## 2017-12-17 RX ADMIN — Medication 1 PUFF(S): at 21:45

## 2017-12-17 RX ADMIN — ALBUTEROL 4 PUFF(S): 90 AEROSOL, METERED ORAL at 10:38

## 2017-12-17 RX ADMIN — VORICONAZOLE 125 MILLIGRAM(S): 10 INJECTION, POWDER, LYOPHILIZED, FOR SOLUTION INTRAVENOUS at 11:48

## 2017-12-17 RX ADMIN — Medication 40 MILLIGRAM(S): at 05:18

## 2017-12-17 RX ADMIN — ENOXAPARIN SODIUM 40 MILLIGRAM(S): 100 INJECTION SUBCUTANEOUS at 11:48

## 2017-12-17 RX ADMIN — ALBUTEROL 4 PUFF(S): 90 AEROSOL, METERED ORAL at 21:43

## 2017-12-17 RX ADMIN — Medication 1 PUFF(S): at 10:38

## 2017-12-17 RX ADMIN — Medication 300 MILLIGRAM(S): at 11:48

## 2017-12-17 RX ADMIN — Medication 500 MILLIGRAM(S): at 06:10

## 2017-12-17 RX ADMIN — VORICONAZOLE 125 MILLIGRAM(S): 10 INJECTION, POWDER, LYOPHILIZED, FOR SOLUTION INTRAVENOUS at 21:34

## 2017-12-17 RX ADMIN — Medication 1 PUFF(S): at 16:20

## 2017-12-17 NOTE — PROGRESS NOTE ADULT - ASSESSMENT
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 Currently on mechanical ventilation. Required trach exchange on 12/15 for cuff leak. Being treated for c.diff as well as for gram neg diplococci in tracheal aspirate

## 2017-12-17 NOTE — PROGRESS NOTE ADULT - SUBJECTIVE AND OBJECTIVE BOX
CHIEF COMPLAINT:    Interval Events:    REVIEW OF SYSTEMS:  Constitutional:   Eyes:  ENT:  CV:  Resp:  GI:  :  MSK:  Integumentary:  Neurological:  Psychiatric:  Endocrine:  Hematologic/Lymphatic:  Allergic/Immunologic:  [ ] All other systems negative  [ ] Unable to assess ROS because ________    OBJECTIVE:  ICU Vital Signs Last 24 Hrs  T(C): 36.8 (17 Dec 2017 05:15), Max: 37.5 (16 Dec 2017 20:59)  T(F): 98.2 (17 Dec 2017 05:15), Max: 99.5 (16 Dec 2017 20:59)  HR: 107 (17 Dec 2017 05:15) (100 - 114)  BP: 111/70 (17 Dec 2017 05:15) (111/70 - 130/89)  BP(mean): --  ABP: --  ABP(mean): --  RR: 17 (17 Dec 2017 05:15) (13 - 17)  SpO2: 100% (17 Dec 2017 05:15) (100% - 100%)    Mode: AC/ CMV (Assist Control/ Continuous Mandatory Ventilation), RR (machine): 12, TV (machine): 450, FiO2: 40, PEEP: 5, MAP: 11, PIP: 32    12-16 @ 07:01 - 12-17 @ 07:00  --------------------------------------------------------  IN: 1202 mL / OUT: 1000 mL / NET: 202 mL    12-17 @ 07:01  -  12-17 @ 07:19  --------------------------------------------------------  IN: 0 mL / OUT: 400 mL / NET: -400 mL      CAPILLARY BLOOD GLUCOSE      POCT Blood Glucose.: 115 mg/dL (17 Dec 2017 06:35)      PHYSICAL EXAM:  General:   HEENT:   Lymph Nodes:  Neck:   Respiratory:   Cardiovascular:   Abdomen:   Extremities:   Skin:   Neurological:  Psychiatry:    HOSPITAL MEDICATIONS:  MEDICATIONS  (STANDING):  ALBUTerol    90 MICROgram(s) HFA Inhaler 4 Puff(s) Inhalation every 6 hours  buDESOnide 160 MICROgram(s)/formoterol 4.5 MICROgram(s) Inhaler 2 Puff(s) Inhalation two times a day  chlorhexidine 4% Liquid 1 Application(s) Topical daily  enoxaparin Injectable 40 milliGRAM(s) SubCutaneous daily  fentaNYL   Patch  50 MICROgram(s)/Hr 1 Patch Transdermal every 72 hours  ferrous    sulfate Liquid 300 milliGRAM(s) Enteral Tube daily  folic acid 1 milliGRAM(s) Oral daily  furosemide   Injectable 40 milliGRAM(s) IV Push daily  insulin lispro (HumaLOG) corrective regimen sliding scale   SubCutaneous every 6 hours  ipratropium 17 MICROgram(s) HFA Inhaler 1 Puff(s) Inhalation every 6 hours  pantoprazole  Injectable 40 milliGRAM(s) IV Push daily  piperacillin/tazobactam IVPB. 3.375 Gram(s) IV Intermittent every 8 hours  valproic  acid Syrup 500 milliGRAM(s) Oral every 12 hours  vancomycin    Solution 125 milliGRAM(s) Enteral Tube every 6 hours  voriconazole IVPB 300 milliGRAM(s) IV Intermittent every 12 hours    MEDICATIONS  (PRN):  acetaminophen    Suspension. 650 milliGRAM(s) Enteral Tube every 6 hours PRN Mild Pain to moderate (1 - 6)  acetaminophen   Tablet 650 milliGRAM(s) Oral every 6 hours PRN For Temp greater than 38 C (100.4 F)  LORazepam   Injectable 0.5 milliGRAM(s) IV Push every 8 hours PRN Anxiety      LABS:                        9.1    13.25 )-----------( 267      ( 16 Dec 2017 07:30 )             30.2     12-16    139  |  96<L>  |  10  ----------------------------<  110<H>  3.8   |  35<H>  |  0.74    Ca    8.8      16 Dec 2017 07:30  Phos  4.3     12-16  Mg     1.9     12-16                MICROBIOLOGY:     RADIOLOGY:  [ ] Reviewed and interpreted by me    PULMONARY FUNCTION TESTS:    EKG: CHIEF COMPLAINT: Patient is a 47y old  Male who presents with a chief complaint of Patient is a 47y old  Male who presents with a chief complaint of hemoptysis (09 Nov 2017 00:32) (09 Dec 2017 16:29)    Interval Events: No acute change overnight    REVIEW OF SYSTEMS:  Constitutional:   Eyes:  ENT:  CV: Denies CP  Resp: c/o intermittent sob/inc wob  GI:  :  MSK:  Integumentary:  Neurological:  Psychiatric:  Endocrine:  Hematologic/Lymphatic:  Allergic/Immunologic:  [x] All other systems negative  [ ] Unable to assess ROS because ________    OBJECTIVE:  ICU Vital Signs Last 24 Hrs  T(C): 36.8 (17 Dec 2017 05:15), Max: 37.5 (16 Dec 2017 20:59)  T(F): 98.2 (17 Dec 2017 05:15), Max: 99.5 (16 Dec 2017 20:59)  HR: 107 (17 Dec 2017 05:15) (100 - 114)  BP: 111/70 (17 Dec 2017 05:15) (111/70 - 130/89)  BP(mean): --  ABP: --  ABP(mean): --  RR: 17 (17 Dec 2017 05:15) (13 - 17)  SpO2: 100% (17 Dec 2017 05:15) (100% - 100%)    Mode: AC/ CMV (Assist Control/ Continuous Mandatory Ventilation), RR (machine): 12, TV (machine): 450, FiO2: 40, PEEP: 5, MAP: 11, PIP: 32    12-16 @ 07:01  -  12-17 @ 07:00  --------------------------------------------------------  IN: 1202 mL / OUT: 1000 mL / NET: 202 mL    12-17 @ 07:01  -  12-17 @ 07:19  --------------------------------------------------------  IN: 0 mL / OUT: 400 mL / NET: -400 mL      CAPILLARY BLOOD GLUCOSE      POCT Blood Glucose.: 115 mg/dL (17 Dec 2017 06:35)      HOSPITAL MEDICATIONS:  MEDICATIONS  (STANDING):  ALBUTerol    90 MICROgram(s) HFA Inhaler 4 Puff(s) Inhalation every 6 hours  buDESOnide 160 MICROgram(s)/formoterol 4.5 MICROgram(s) Inhaler 2 Puff(s) Inhalation two times a day  chlorhexidine 4% Liquid 1 Application(s) Topical daily  enoxaparin Injectable 40 milliGRAM(s) SubCutaneous daily  fentaNYL   Patch  50 MICROgram(s)/Hr 1 Patch Transdermal every 72 hours  ferrous    sulfate Liquid 300 milliGRAM(s) Enteral Tube daily  folic acid 1 milliGRAM(s) Oral daily  furosemide   Injectable 40 milliGRAM(s) IV Push daily  insulin lispro (HumaLOG) corrective regimen sliding scale   SubCutaneous every 6 hours  ipratropium 17 MICROgram(s) HFA Inhaler 1 Puff(s) Inhalation every 6 hours  pantoprazole  Injectable 40 milliGRAM(s) IV Push daily  piperacillin/tazobactam IVPB. 3.375 Gram(s) IV Intermittent every 8 hours  valproic  acid Syrup 500 milliGRAM(s) Oral every 12 hours  vancomycin    Solution 125 milliGRAM(s) Enteral Tube every 6 hours  voriconazole IVPB 300 milliGRAM(s) IV Intermittent every 12 hours    MEDICATIONS  (PRN):  acetaminophen    Suspension. 650 milliGRAM(s) Enteral Tube every 6 hours PRN Mild Pain to moderate (1 - 6)  acetaminophen   Tablet 650 milliGRAM(s) Oral every 6 hours PRN For Temp greater than 38 C (100.4 F)  LORazepam   Injectable 0.5 milliGRAM(s) IV Push every 8 hours PRN Anxiety      LABS:                        9.1    13.25 )-----------( 267      ( 16 Dec 2017 07:30 )             30.2     12-16    139  |  96<L>  |  10  ----------------------------<  110<H>  3.8   |  35<H>  |  0.74    Ca    8.8      16 Dec 2017 07:30  Phos  4.3     12-16  Mg     1.9     12-16                MICROBIOLOGY:     RADIOLOGY:  [ ] Reviewed and interpreted by me    PULMONARY FUNCTION TESTS:    EKG:

## 2017-12-17 NOTE — PROGRESS NOTE ADULT - PROBLEM SELECTOR PLAN 2
-Trach to vent.  -Daily Lasix and bid as needed.   - weaning trials as tolerated (did not tolerate today)

## 2017-12-17 NOTE — PROGRESS NOTE ADULT - SUBJECTIVE AND OBJECTIVE BOX
Neurology Progress    OSCAR [id number]    HPI:  47 year old male with a PMHx of sarcoidosis, HTN, asthma, and COPD presents to the ED with severe leg spasms and headache leading to a presyncopal episode, found to have a significant abnormal EKG and moderate inspiratory and expiratory wheezing in the setting of Entero/Rhino virus, admitted to [facility] for Near Syncope, r/o ACS and COPD exacerbation.  Hospital course complicated by stroke and PAF- AC on hold due to hemoptysis; 9/21-FB/BAL done by Dr. Arias. Intraop: Bleeding from DAVID (non-lingular)-Recom IR to embolize; 9/22 CT angio done per IR Lobko request.  Formal PFTS and ask Card to fix PFO now to decrease blood flow; 9/24- Developed worsening hemoptysis. Emergently kristel to OR and had  Left thoractomy DAVID. Brought to CTICU.  9/24: 1400ml EBL in OR; 10 u PRBCs;9/25-Brought back to OR for bleeding. Had Reop Thoracotomy, evac left hemothorax. CTI- DAVID space despite -40 Sxn, MEYER, L Hemiparesis (CVA) better w walking; Patient post op with prolongd air leak shila ptx, bedside pleurodesis done.  Developed SIRS after pleurodesis-> CTICU; On pressors. Pt. clinically improved in CTICU. Maintained on Steroids, weaned down to Solumedrol BID. Off pressors and transitioned to Midodrine. Still with thrombocytopenia but no bleeding. Continued on Eliquis but no ASA for h/o CVA. Neuro deficits improving.  Air leak resolved on 10/27. 10/30 Chest tube removed, cxr stable.  Steriods to po taper.  Patient stable for discharge.  11/2-Pt ambulating w PT frequently. Feels well. Pain controlled. All labs and vital signs stable. Wound healing. Arnold po steroid taper. To complete Voriconazole on Nov 4th. CXRs and reports reviewed by and cleared by Dr. Caldera. Pt. to fu for PFO closure as output. Still requires intermittent oxygen. Cleared for discharge by Dr. Caldera to acute rehab.    Patient presented to [facility] after rehab send patient with hemoptysis, patient had 2 episodes of hemoptysis, less than 10cc in total for 24 hours.  Patient states it happens when he coughs, blood tinged sputum.  Patient otherwise doing well in rehab. (09 Nov 2017 00:32)      Past Medical History  History of pneumothorax  COPD (chronic obstructive pulmonary disease)  Asthma  Hypertension  Sarcoidosis      Past Surgical History  History of thoracotomy  No significant past surgical history      MEDICATIONS    acetaminophen    Suspension. 650 milliGRAM(s) Enteral Tube every 6 hours PRN  acetaminophen   Tablet 650 milliGRAM(s) Oral every 6 hours PRN  ALBUTerol    90 MICROgram(s) HFA Inhaler 4 Puff(s) Inhalation every 6 hours  buDESOnide 160 MICROgram(s)/formoterol 4.5 MICROgram(s) Inhaler 2 Puff(s) Inhalation two times a day  chlorhexidine 4% Liquid 1 Application(s) Topical daily  enoxaparin Injectable 40 milliGRAM(s) SubCutaneous daily  fentaNYL   Patch  50 MICROgram(s)/Hr 1 Patch Transdermal every 72 hours  ferrous    sulfate Liquid 300 milliGRAM(s) Enteral Tube daily  folic acid 1 milliGRAM(s) Oral daily  furosemide   Injectable 40 milliGRAM(s) IV Push daily  insulin lispro (HumaLOG) corrective regimen sliding scale   SubCutaneous every 6 hours  ipratropium 17 MICROgram(s) HFA Inhaler 1 Puff(s) Inhalation every 6 hours  LORazepam   Injectable 0.5 milliGRAM(s) IV Push every 8 hours PRN  pantoprazole  Injectable 40 milliGRAM(s) IV Push daily  piperacillin/tazobactam IVPB. 3.375 Gram(s) IV Intermittent every 8 hours  potassium chloride   Powder 40 milliEquivalent(s) Oral once  valproic  acid Syrup 500 milliGRAM(s) Oral every 12 hours  vancomycin    Solution 125 milliGRAM(s) Enteral Tube every 6 hours  voriconazole IVPB 300 milliGRAM(s) IV Intermittent every 12 hours         Family history: No history of dementia, strokes, or seizures   FAMILY HISTORY:  Family history of pancreatic cancer  Family history of essential hypertension (Father)    SOCIAL HISTORY -- No history of tobacco or alcohol use     Allergies    No Known Allergies    Intolerances            Vital Signs Last 24 Hrs  T(C): 37.4 (17 Dec 2017 09:32), Max: 37.5 (16 Dec 2017 20:59)  T(F): 99.4 (17 Dec 2017 09:32), Max: 99.5 (16 Dec 2017 20:59)  HR: 110 (17 Dec 2017 09:32) (100 - 114)  BP: 126/82 (17 Dec 2017 09:32) (111/70 - 126/82)  BP(mean): --  RR: 17 (17 Dec 2017 09:32) (14 - 17)  SpO2: 100% (17 Dec 2017 09:32) (100% - 100%)        On Neurological Examination:    Mental Status - Patient is alert, awake,   Follows commands well   Speech -  low few words Fluent no Dysarthria  no  Aphasia                              Cranial Nerves - Extraocular muscle intact  GIOVANI Facial symmetry Tongue midline, CnV1to V3 intact gross hearing intact      Motor Exam -   Right upper  5/5 throughout  Left upper4/5 throughtout  Right lower- 5/5 throughout  Left lower 4/5 throughout  Coordination -finger to nose intact  Muscle tone - is normal all over. No asymmetry is seen.      Sensory    Bilateral intact to light touch    Gait -  normal  no ataxia     GENERAL Exam:     Nontoxic , No Acute Distress   	  HEENT:  normocephalic, atraumatic  		  LUNGS:	Clear bilaterally  No Wheeze      VASCULAR: no carotid brui  	  HEART:	 Normal S1S2   No murmur RRR        	  MUSCULOSKELETAL: Normal Range of Motion  	   SKIN:      Normal   No Ecchymosis               LABS:  CBC Full  -  ( 17 Dec 2017 06:55 )  WBC Count : 11.48 K/uL  Hemoglobin : 7.8 g/dL  Hematocrit : 25.4 %  Platelet Count - Automated : 271 K/uL  Mean Cell Volume : 95.8 fL  Mean Cell Hemoglobin : 29.4 pg  Mean Cell Hemoglobin Concentration : 30.7 %  Auto Neutrophil # : x  Auto Lymphocyte # : x  Auto Monocyte # : x  Auto Eosinophil # : x  Auto Basophil # : x  Auto Neutrophil % : x  Auto Lymphocyte % : x  Auto Monocyte % : x  Auto Eosinophil % : x  Auto Basophil % : x      12-17    140  |  94<L>  |  9   ----------------------------<  118<H>  3.4<L>   |  38<H>  |  0.69    Ca    8.7      17 Dec 2017 06:55  Phos  3.3     12-17  Mg     1.8     12-17      Hemoglobin A1C:       Vitamin B12         RADIOLOGY    EKG      IMPRESSION  This is a  year old           schoenberg

## 2017-12-17 NOTE — PROGRESS NOTE ADULT - ATTENDING COMMENTS
Acinetobacter in sputum on zosyn, sensitive to this  C.diff diarrhea on po vancomycin  PT, weaning trials as tolerated

## 2017-12-17 NOTE — PROGRESS NOTE ADULT - PROBLEM SELECTOR PLAN 1
On voriconazole (h/o aspergillosis with sarcoidosis)  Continues on zosyn for tracheal aspirate with Acinetobacter  PO vanco for c.diff / flexiseal  f/u blood cultures

## 2017-12-18 LAB
-  AMIKACIN: SIGNIFICANT CHANGE UP
-  AMIKACIN: SIGNIFICANT CHANGE UP
-  AMPICILLIN/SULBACTAM: SIGNIFICANT CHANGE UP
-  AMPICILLIN/SULBACTAM: SIGNIFICANT CHANGE UP
-  AMPICILLIN: SIGNIFICANT CHANGE UP
-  AZTREONAM: SIGNIFICANT CHANGE UP
-  CEFAZOLIN: SIGNIFICANT CHANGE UP
-  CEFEPIME: SIGNIFICANT CHANGE UP
-  CEFEPIME: SIGNIFICANT CHANGE UP
-  CEFOXITIN: SIGNIFICANT CHANGE UP
-  CEFTAZIDIME: SIGNIFICANT CHANGE UP
-  CEFTAZIDIME: SIGNIFICANT CHANGE UP
-  CEFTRIAXONE: SIGNIFICANT CHANGE UP
-  CIPROFLOXACIN: SIGNIFICANT CHANGE UP
-  CIPROFLOXACIN: SIGNIFICANT CHANGE UP
-  ERTAPENEM: SIGNIFICANT CHANGE UP
-  GENTAMICIN: SIGNIFICANT CHANGE UP
-  GENTAMICIN: SIGNIFICANT CHANGE UP
-  IMIPENEM: SIGNIFICANT CHANGE UP
-  LEVOFLOXACIN: SIGNIFICANT CHANGE UP
-  LEVOFLOXACIN: SIGNIFICANT CHANGE UP
-  MEROPENEM: SIGNIFICANT CHANGE UP
-  MEROPENEM: SIGNIFICANT CHANGE UP
-  PIPERACILLIN/TAZOBACTAM: SIGNIFICANT CHANGE UP
-  TIGECYCLINE: SIGNIFICANT CHANGE UP
-  TOBRAMYCIN: SIGNIFICANT CHANGE UP
-  TOBRAMYCIN: SIGNIFICANT CHANGE UP
-  TRIMETHOPRIM/SULFAMETHOXAZOLE: SIGNIFICANT CHANGE UP
BACTERIA SPT RESP CULT: SIGNIFICANT CHANGE UP
BUN SERPL-MCNC: 8 MG/DL — SIGNIFICANT CHANGE UP (ref 7–23)
CALCIUM SERPL-MCNC: 8.6 MG/DL — SIGNIFICANT CHANGE UP (ref 8.4–10.5)
CHLORIDE SERPL-SCNC: 98 MMOL/L — SIGNIFICANT CHANGE UP (ref 98–107)
CO2 SERPL-SCNC: 37 MMOL/L — HIGH (ref 22–31)
CREAT SERPL-MCNC: 0.61 MG/DL — SIGNIFICANT CHANGE UP (ref 0.5–1.3)
GLUCOSE BLDC GLUCOMTR-MCNC: 103 MG/DL — HIGH (ref 70–99)
GLUCOSE BLDC GLUCOMTR-MCNC: 104 MG/DL — HIGH (ref 70–99)
GLUCOSE BLDC GLUCOMTR-MCNC: 121 MG/DL — HIGH (ref 70–99)
GLUCOSE SERPL-MCNC: 126 MG/DL — HIGH (ref 70–99)
HCT VFR BLD CALC: 23.7 % — LOW (ref 39–50)
HGB BLD-MCNC: 7.5 G/DL — LOW (ref 13–17)
MCHC RBC-ENTMCNC: 29.8 PG — SIGNIFICANT CHANGE UP (ref 27–34)
MCHC RBC-ENTMCNC: 31.6 % — LOW (ref 32–36)
MCV RBC AUTO: 94 FL — SIGNIFICANT CHANGE UP (ref 80–100)
METHOD TYPE: SIGNIFICANT CHANGE UP
NRBC # FLD: 0 — SIGNIFICANT CHANGE UP
ORGANISM # SPEC MICROSCOPIC CNT: SIGNIFICANT CHANGE UP
ORGANISM # SPEC MICROSCOPIC CNT: SIGNIFICANT CHANGE UP
PLATELET # BLD AUTO: 250 K/UL — SIGNIFICANT CHANGE UP (ref 150–400)
PMV BLD: 13.1 FL — HIGH (ref 7–13)
POTASSIUM SERPL-MCNC: 3.8 MMOL/L — SIGNIFICANT CHANGE UP (ref 3.5–5.3)
POTASSIUM SERPL-SCNC: 3.8 MMOL/L — SIGNIFICANT CHANGE UP (ref 3.5–5.3)
RBC # BLD: 2.52 M/UL — LOW (ref 4.2–5.8)
RBC # FLD: 15.4 % — HIGH (ref 10.3–14.5)
SODIUM SERPL-SCNC: 146 MMOL/L — HIGH (ref 135–145)
WBC # BLD: 11.7 K/UL — HIGH (ref 3.8–10.5)
WBC # FLD AUTO: 11.7 K/UL — HIGH (ref 3.8–10.5)

## 2017-12-18 PROCEDURE — 99233 SBSQ HOSP IP/OBS HIGH 50: CPT | Mod: 25,GC

## 2017-12-18 PROCEDURE — 99497 ADVNCD CARE PLAN 30 MIN: CPT

## 2017-12-18 RX ADMIN — FENTANYL CITRATE 1 PATCH: 50 INJECTION INTRAVENOUS at 11:57

## 2017-12-18 RX ADMIN — ALBUTEROL 4 PUFF(S): 90 AEROSOL, METERED ORAL at 03:37

## 2017-12-18 RX ADMIN — Medication 125 MILLIGRAM(S): at 05:46

## 2017-12-18 RX ADMIN — ALBUTEROL 4 PUFF(S): 90 AEROSOL, METERED ORAL at 16:30

## 2017-12-18 RX ADMIN — Medication 1 PUFF(S): at 03:39

## 2017-12-18 RX ADMIN — BUDESONIDE AND FORMOTEROL FUMARATE DIHYDRATE 2 PUFF(S): 160; 4.5 AEROSOL RESPIRATORY (INHALATION) at 11:07

## 2017-12-18 RX ADMIN — ENOXAPARIN SODIUM 40 MILLIGRAM(S): 100 INJECTION SUBCUTANEOUS at 11:45

## 2017-12-18 RX ADMIN — Medication 1 MILLIGRAM(S): at 11:45

## 2017-12-18 RX ADMIN — Medication 1 PUFF(S): at 16:30

## 2017-12-18 RX ADMIN — Medication 125 MILLIGRAM(S): at 00:05

## 2017-12-18 RX ADMIN — ALBUTEROL 4 PUFF(S): 90 AEROSOL, METERED ORAL at 11:07

## 2017-12-18 RX ADMIN — Medication 125 MILLIGRAM(S): at 17:24

## 2017-12-18 RX ADMIN — PIPERACILLIN AND TAZOBACTAM 25 GRAM(S): 4; .5 INJECTION, POWDER, LYOPHILIZED, FOR SOLUTION INTRAVENOUS at 12:16

## 2017-12-18 RX ADMIN — VORICONAZOLE 125 MILLIGRAM(S): 10 INJECTION, POWDER, LYOPHILIZED, FOR SOLUTION INTRAVENOUS at 13:44

## 2017-12-18 RX ADMIN — FENTANYL CITRATE 1 PATCH: 50 INJECTION INTRAVENOUS at 11:45

## 2017-12-18 RX ADMIN — Medication 125 MILLIGRAM(S): at 11:45

## 2017-12-18 RX ADMIN — Medication 40 MILLIGRAM(S): at 05:47

## 2017-12-18 RX ADMIN — ALBUTEROL 4 PUFF(S): 90 AEROSOL, METERED ORAL at 21:38

## 2017-12-18 RX ADMIN — Medication 500 MILLIGRAM(S): at 05:47

## 2017-12-18 RX ADMIN — BUDESONIDE AND FORMOTEROL FUMARATE DIHYDRATE 2 PUFF(S): 160; 4.5 AEROSOL RESPIRATORY (INHALATION) at 21:42

## 2017-12-18 RX ADMIN — CHLORHEXIDINE GLUCONATE 1 APPLICATION(S): 213 SOLUTION TOPICAL at 11:45

## 2017-12-18 RX ADMIN — PIPERACILLIN AND TAZOBACTAM 25 GRAM(S): 4; .5 INJECTION, POWDER, LYOPHILIZED, FOR SOLUTION INTRAVENOUS at 02:25

## 2017-12-18 RX ADMIN — VORICONAZOLE 125 MILLIGRAM(S): 10 INJECTION, POWDER, LYOPHILIZED, FOR SOLUTION INTRAVENOUS at 21:31

## 2017-12-18 RX ADMIN — PIPERACILLIN AND TAZOBACTAM 25 GRAM(S): 4; .5 INJECTION, POWDER, LYOPHILIZED, FOR SOLUTION INTRAVENOUS at 18:28

## 2017-12-18 RX ADMIN — Medication 300 MILLIGRAM(S): at 11:45

## 2017-12-18 RX ADMIN — Medication 0.5 MILLIGRAM(S): at 22:16

## 2017-12-18 RX ADMIN — Medication 1 PUFF(S): at 11:06

## 2017-12-18 RX ADMIN — PANTOPRAZOLE SODIUM 40 MILLIGRAM(S): 20 TABLET, DELAYED RELEASE ORAL at 11:45

## 2017-12-18 RX ADMIN — Medication 500 MILLIGRAM(S): at 18:28

## 2017-12-18 RX ADMIN — Medication 1 PUFF(S): at 21:40

## 2017-12-18 NOTE — PROGRESS NOTE ADULT - SUBJECTIVE AND OBJECTIVE BOX
CHIEF COMPLAINT:    Interval Events:    REVIEW OF SYSTEMS:  Constitutional:   Eyes:  ENT:  CV:  Resp:  GI:  :  MSK:  Integumentary:  Neurological:  Psychiatric:  Endocrine:  Hematologic/Lymphatic:  Allergic/Immunologic:  [ ] All other systems negative  [ ] Unable to assess ROS because ________    OBJECTIVE:  ICU Vital Signs Last 24 Hrs  T(C): 37.3 (18 Dec 2017 05:42), Max: 37.5 (18 Dec 2017 02:00)  T(F): 99.2 (18 Dec 2017 05:42), Max: 99.5 (18 Dec 2017 02:00)  HR: 104 (18 Dec 2017 05:42) (103 - 119)  BP: 124/86 (18 Dec 2017 05:42) (112/76 - 126/82)  BP(mean): --  ABP: --  ABP(mean): --  RR: 16 (18 Dec 2017 05:42) (13 - 17)  SpO2: 100% (18 Dec 2017 05:42) (100% - 100%)    Mode: AC/ CMV (Assist Control/ Continuous Mandatory Ventilation), RR (machine): 12, TV (machine): 450, FiO2: 40, PEEP: 5, MAP: 12, PIP: 29    12-17 @ 07:01  -  12-18 @ 07:00  --------------------------------------------------------  IN: 1152 mL / OUT: 1600 mL / NET: -448 mL      CAPILLARY BLOOD GLUCOSE      POCT Blood Glucose.: 121 mg/dL (18 Dec 2017 06:20)      PHYSICAL EXAM:  General:   HEENT:   Lymph Nodes:  Neck:   Respiratory:   Cardiovascular:   Abdomen:   Extremities:   Skin:   Neurological:  Psychiatry:    HOSPITAL MEDICATIONS:  MEDICATIONS  (STANDING):  ALBUTerol    90 MICROgram(s) HFA Inhaler 4 Puff(s) Inhalation every 6 hours  buDESOnide 160 MICROgram(s)/formoterol 4.5 MICROgram(s) Inhaler 2 Puff(s) Inhalation two times a day  chlorhexidine 4% Liquid 1 Application(s) Topical daily  enoxaparin Injectable 40 milliGRAM(s) SubCutaneous daily  fentaNYL   Patch  50 MICROgram(s)/Hr 1 Patch Transdermal every 72 hours  ferrous    sulfate Liquid 300 milliGRAM(s) Enteral Tube daily  folic acid 1 milliGRAM(s) Oral daily  furosemide   Injectable 40 milliGRAM(s) IV Push daily  insulin lispro (HumaLOG) corrective regimen sliding scale   SubCutaneous every 6 hours  ipratropium 17 MICROgram(s) HFA Inhaler 1 Puff(s) Inhalation every 6 hours  pantoprazole  Injectable 40 milliGRAM(s) IV Push daily  piperacillin/tazobactam IVPB. 3.375 Gram(s) IV Intermittent every 8 hours  valproic  acid Syrup 500 milliGRAM(s) Oral every 12 hours  vancomycin    Solution 125 milliGRAM(s) Enteral Tube every 6 hours  voriconazole IVPB 300 milliGRAM(s) IV Intermittent every 12 hours    MEDICATIONS  (PRN):  acetaminophen    Suspension. 650 milliGRAM(s) Enteral Tube every 6 hours PRN Mild Pain to moderate (1 - 6)  acetaminophen   Tablet 650 milliGRAM(s) Oral every 6 hours PRN For Temp greater than 38 C (100.4 F)  LORazepam   Injectable 0.5 milliGRAM(s) IV Push every 8 hours PRN Anxiety      LABS:                        7.5    11.70 )-----------( 250      ( 18 Dec 2017 06:00 )             23.7     12-18    146<H>  |  98  |  8   ----------------------------<  126<H>  3.8   |  37<H>  |  0.61    Ca    8.6      18 Dec 2017 06:00  Phos  3.3     12-17  Mg     1.8     12-17                MICROBIOLOGY:     RADIOLOGY:  [ ] Reviewed and interpreted by me    PULMONARY FUNCTION TESTS:    EKG: CHIEF COMPLAINT: Patient is a 47y old  Male who presents with a chief complaint of Patient is a 47y old  Male who presents with a chief complaint of hemoptysis (09 Nov 2017 00:32) (09 Dec 2017 16:29)    Interval Events: No acute events overnight    REVIEW OF SYSTEMS:  Constitutional: Getting frustrated.  Eyes:  ENT:  CV: Denies CP  Resp: c/o intermittent SOB  GI:  :  MSK:  Integumentary:  Neurological:  Psychiatric:  Endocrine:  Hematologic/Lymphatic:  Allergic/Immunologic:  [x] All other systems negative  [ ] Unable to assess ROS because ________    OBJECTIVE:  ICU Vital Signs Last 24 Hrs  T(C): 37.3 (18 Dec 2017 05:42), Max: 37.5 (18 Dec 2017 02:00)  T(F): 99.2 (18 Dec 2017 05:42), Max: 99.5 (18 Dec 2017 02:00)  HR: 104 (18 Dec 2017 05:42) (103 - 119)  BP: 124/86 (18 Dec 2017 05:42) (112/76 - 126/82)  BP(mean): --  ABP: --  ABP(mean): --  RR: 16 (18 Dec 2017 05:42) (13 - 17)  SpO2: 100% (18 Dec 2017 05:42) (100% - 100%)    Mode: AC/ CMV (Assist Control/ Continuous Mandatory Ventilation), RR (machine): 12, TV (machine): 450, FiO2: 40, PEEP: 5, MAP: 12, PIP: 29    12-17 @ 07:01  -  12-18 @ 07:00  --------------------------------------------------------  IN: 1152 mL / OUT: 1600 mL / NET: -448 mL      CAPILLARY BLOOD GLUCOSE      POCT Blood Glucose.: 121 mg/dL (18 Dec 2017 06:20)      HOSPITAL MEDICATIONS:  MEDICATIONS  (STANDING):  ALBUTerol    90 MICROgram(s) HFA Inhaler 4 Puff(s) Inhalation every 6 hours  buDESOnide 160 MICROgram(s)/formoterol 4.5 MICROgram(s) Inhaler 2 Puff(s) Inhalation two times a day  chlorhexidine 4% Liquid 1 Application(s) Topical daily  enoxaparin Injectable 40 milliGRAM(s) SubCutaneous daily  fentaNYL   Patch  50 MICROgram(s)/Hr 1 Patch Transdermal every 72 hours  ferrous    sulfate Liquid 300 milliGRAM(s) Enteral Tube daily  folic acid 1 milliGRAM(s) Oral daily  furosemide   Injectable 40 milliGRAM(s) IV Push daily  insulin lispro (HumaLOG) corrective regimen sliding scale   SubCutaneous every 6 hours  ipratropium 17 MICROgram(s) HFA Inhaler 1 Puff(s) Inhalation every 6 hours  pantoprazole  Injectable 40 milliGRAM(s) IV Push daily  piperacillin/tazobactam IVPB. 3.375 Gram(s) IV Intermittent every 8 hours  valproic  acid Syrup 500 milliGRAM(s) Oral every 12 hours  vancomycin    Solution 125 milliGRAM(s) Enteral Tube every 6 hours  voriconazole IVPB 300 milliGRAM(s) IV Intermittent every 12 hours    MEDICATIONS  (PRN):  acetaminophen    Suspension. 650 milliGRAM(s) Enteral Tube every 6 hours PRN Mild Pain to moderate (1 - 6)  acetaminophen   Tablet 650 milliGRAM(s) Oral every 6 hours PRN For Temp greater than 38 C (100.4 F)  LORazepam   Injectable 0.5 milliGRAM(s) IV Push every 8 hours PRN Anxiety      LABS:                        7.5    11.70 )-----------( 250      ( 18 Dec 2017 06:00 )             23.7     12-18    146<H>  |  98  |  8   ----------------------------<  126<H>  3.8   |  37<H>  |  0.61    Ca    8.6      18 Dec 2017 06:00  Phos  3.3     12-17  Mg     1.8     12-17                MICROBIOLOGY:     RADIOLOGY:  [ ] Reviewed and interpreted by me    PULMONARY FUNCTION TESTS:    EKG:

## 2017-12-18 NOTE — PROGRESS NOTE ADULT - ATTENDING COMMENTS
Patient seen and examined. Originally p/w hemoptysis now s/p complicated hospital course including lobectomy  Acute Respiratory failure in setting of hemoptysis - now s/p lobectomy - requiring full mechanical ventilation  Sarcoidosis and COPD in setting of 9/11 exposure  CVA - left sided weakness - strength improving  Oropharyngeal dysphagia - PEG feeds  Acenitobacter in sputum - continue antibiotics  C. diff - still with watery stools - continue treatment  Eventual discharge planning to [facility] facility    Advanced Care Planning 20 minutes - discussion with patient, mother, significant other regarding overall status and eventual goals. Patient to remain full code. Family and patient for discharge and strengthening with ultimate hope of being seen at a lung transplant center. At this point transplant may not be feasible given his debility and respiratory failure. Will get OOB and continue PT

## 2017-12-18 NOTE — PROGRESS NOTE ADULT - ASSESSMENT
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 Currently on mechanical ventilation. Required trach exchange on 12/15 for cuff leak. Being treated for c.diff as well as for Acinetobacter in tracheal aspirate

## 2017-12-19 LAB
BACTERIA BLD CULT: SIGNIFICANT CHANGE UP
BACTERIA BLD CULT: SIGNIFICANT CHANGE UP
BUN SERPL-MCNC: 8 MG/DL — SIGNIFICANT CHANGE UP (ref 7–23)
CALCIUM SERPL-MCNC: 8.8 MG/DL — SIGNIFICANT CHANGE UP (ref 8.4–10.5)
CHLORIDE SERPL-SCNC: 92 MMOL/L — LOW (ref 98–107)
CO2 SERPL-SCNC: 41 MMOL/L — HIGH (ref 22–31)
CREAT SERPL-MCNC: 0.69 MG/DL — SIGNIFICANT CHANGE UP (ref 0.5–1.3)
GLUCOSE BLDC GLUCOMTR-MCNC: 105 MG/DL — HIGH (ref 70–99)
GLUCOSE BLDC GLUCOMTR-MCNC: 105 MG/DL — HIGH (ref 70–99)
GLUCOSE BLDC GLUCOMTR-MCNC: 112 MG/DL — HIGH (ref 70–99)
GLUCOSE BLDC GLUCOMTR-MCNC: 96 MG/DL — SIGNIFICANT CHANGE UP (ref 70–99)
GLUCOSE BLDC GLUCOMTR-MCNC: 99 MG/DL — SIGNIFICANT CHANGE UP (ref 70–99)
GLUCOSE SERPL-MCNC: 115 MG/DL — HIGH (ref 70–99)
POTASSIUM SERPL-MCNC: 3.4 MMOL/L — LOW (ref 3.5–5.3)
POTASSIUM SERPL-SCNC: 3.4 MMOL/L — LOW (ref 3.5–5.3)
SODIUM SERPL-SCNC: 141 MMOL/L — SIGNIFICANT CHANGE UP (ref 135–145)

## 2017-12-19 PROCEDURE — 99233 SBSQ HOSP IP/OBS HIGH 50: CPT | Mod: GC

## 2017-12-19 PROCEDURE — 93010 ELECTROCARDIOGRAM REPORT: CPT

## 2017-12-19 RX ORDER — POTASSIUM CHLORIDE 20 MEQ
40 PACKET (EA) ORAL ONCE
Qty: 0 | Refills: 0 | Status: COMPLETED | OUTPATIENT
Start: 2017-12-19 | End: 2017-12-19

## 2017-12-19 RX ORDER — VANCOMYCIN HCL 1 G
500 VIAL (EA) INTRAVENOUS EVERY 6 HOURS
Qty: 0 | Refills: 0 | Status: COMPLETED | OUTPATIENT
Start: 2017-12-19 | End: 2018-01-02

## 2017-12-19 RX ADMIN — Medication 1 PUFF(S): at 03:40

## 2017-12-19 RX ADMIN — Medication 300 MILLIGRAM(S): at 12:40

## 2017-12-19 RX ADMIN — ALBUTEROL 4 PUFF(S): 90 AEROSOL, METERED ORAL at 22:21

## 2017-12-19 RX ADMIN — BUDESONIDE AND FORMOTEROL FUMARATE DIHYDRATE 2 PUFF(S): 160; 4.5 AEROSOL RESPIRATORY (INHALATION) at 22:21

## 2017-12-19 RX ADMIN — ALBUTEROL 4 PUFF(S): 90 AEROSOL, METERED ORAL at 03:38

## 2017-12-19 RX ADMIN — Medication 500 MILLIGRAM(S): at 18:11

## 2017-12-19 RX ADMIN — Medication 0.5 MILLIGRAM(S): at 21:04

## 2017-12-19 RX ADMIN — Medication 40 MILLIGRAM(S): at 05:04

## 2017-12-19 RX ADMIN — Medication 500 MILLIGRAM(S): at 12:40

## 2017-12-19 RX ADMIN — PANTOPRAZOLE SODIUM 40 MILLIGRAM(S): 20 TABLET, DELAYED RELEASE ORAL at 12:41

## 2017-12-19 RX ADMIN — Medication 1 PUFF(S): at 10:08

## 2017-12-19 RX ADMIN — Medication 125 MILLIGRAM(S): at 00:39

## 2017-12-19 RX ADMIN — Medication 1 MILLIGRAM(S): at 12:41

## 2017-12-19 RX ADMIN — ENOXAPARIN SODIUM 40 MILLIGRAM(S): 100 INJECTION SUBCUTANEOUS at 12:42

## 2017-12-19 RX ADMIN — CHLORHEXIDINE GLUCONATE 1 APPLICATION(S): 213 SOLUTION TOPICAL at 12:41

## 2017-12-19 RX ADMIN — Medication 1 PUFF(S): at 16:30

## 2017-12-19 RX ADMIN — Medication 40 MILLIEQUIVALENT(S): at 08:45

## 2017-12-19 RX ADMIN — Medication 125 MILLIGRAM(S): at 05:03

## 2017-12-19 RX ADMIN — PIPERACILLIN AND TAZOBACTAM 25 GRAM(S): 4; .5 INJECTION, POWDER, LYOPHILIZED, FOR SOLUTION INTRAVENOUS at 05:04

## 2017-12-19 RX ADMIN — VORICONAZOLE 125 MILLIGRAM(S): 10 INJECTION, POWDER, LYOPHILIZED, FOR SOLUTION INTRAVENOUS at 12:39

## 2017-12-19 RX ADMIN — ALBUTEROL 4 PUFF(S): 90 AEROSOL, METERED ORAL at 16:30

## 2017-12-19 RX ADMIN — Medication 500 MILLIGRAM(S): at 05:04

## 2017-12-19 RX ADMIN — Medication 1 PUFF(S): at 22:21

## 2017-12-19 RX ADMIN — BUDESONIDE AND FORMOTEROL FUMARATE DIHYDRATE 2 PUFF(S): 160; 4.5 AEROSOL RESPIRATORY (INHALATION) at 10:08

## 2017-12-19 RX ADMIN — ALBUTEROL 4 PUFF(S): 90 AEROSOL, METERED ORAL at 10:09

## 2017-12-19 NOTE — PROGRESS NOTE ADULT - PROBLEM SELECTOR PLAN 1
On voriconazole (h/o aspergillosis with sarcoidosis)  Change Zosyn to Levaquin for tracheal aspirate with Acinetobacter and Klebsiella  PO vanco for c.diff / flexiseal  f/u blood cultures On voriconazole (h/o aspergillosis with sarcoidosis)  Change Zosyn to Levaquin for tracheal aspirate with Acinetobacter and Klebsiella  PO vanco for c.diff / flexiseal. Stool still watery. Increase vanco to 500mg  Sputum culture with Acinetobacter and Klebsiella. Zosyn changed to Levaquin On voriconazole (h/o aspergillosis with sarcoidosis) through 12/28 per ID  Change Zosyn to Levaquin for tracheal aspirate with Acinetobacter and Klebsiella  PO vanco for c.diff / flexiseal. Stool still watery. Increase vanco to 500mg  Sputum culture with Acinetobacter and Klebsiella. Zosyn changed to Levaquin

## 2017-12-19 NOTE — PROGRESS NOTE ADULT - PROBLEM SELECTOR PLAN 7
- Vanco via PEG   - contact precautions   - Flexiseal   Follow bun/'cr - Vanco via PEG increase to 500mg  - contact precautions   - Flexiseal stool still watery  Follow bun/'cr

## 2017-12-19 NOTE — PROGRESS NOTE ADULT - ATTENDING COMMENTS
Agree with above. Patient seen and examined. Originally p/w hemoptysis now s/p complicated hospital course including lobectomy  Acute Respiratory failure in setting of hemoptysis - now s/p lobectomy - requiring full mechanical ventilation  Sarcoidosis and COPD in setting of 9/11 exposure  CVA - left sided weakness - strength improving  Oropharyngeal dysphagia - PEG feeds  Acenitobacter and Klebsiella in sputum - continue antibiotics  C. diff - still with watery stools - continue treatment but will increase Vanco PO dose  Eventual discharge planning to vent facility

## 2017-12-19 NOTE — PROGRESS NOTE ADULT - SUBJECTIVE AND OBJECTIVE BOX
CHIEF COMPLAINT:    Interval Events:    REVIEW OF SYSTEMS:  Constitutional:   Eyes:  ENT:  CV:  Resp:  GI:  :  MSK:  Integumentary:  Neurological:  Psychiatric:  Endocrine:  Hematologic/Lymphatic:  Allergic/Immunologic:  [ ] All other systems negative  [ ] Unable to assess ROS because ________    OBJECTIVE:  ICU Vital Signs Last 24 Hrs  T(C): 37.4 (19 Dec 2017 05:00), Max: 37.8 (18 Dec 2017 17:20)  T(F): 99.4 (19 Dec 2017 05:00), Max: 100 (18 Dec 2017 17:20)  HR: 105 (19 Dec 2017 07:22) (103 - 115)  BP: 104/68 (19 Dec 2017 05:00) (104/68 - 133/90)  BP(mean): 94 (18 Dec 2017 17:20) (85 - 94)  ABP: --  ABP(mean): --  RR: 18 (19 Dec 2017 05:00) (17 - 19)  SpO2: 98% (19 Dec 2017 07:22) (98% - 100%)    Mode: AC/ CMV (Assist Control/ Continuous Mandatory Ventilation), RR (machine): 12, TV (machine): 450, FiO2: 40, PEEP: 5, MAP: 11, PIP: 31    12-18 @ 07:01  -  12-19 @ 07:00  --------------------------------------------------------  IN: 2287 mL / OUT: 1550 mL / NET: 737 mL      CAPILLARY BLOOD GLUCOSE      POCT Blood Glucose.: 112 mg/dL (19 Dec 2017 06:19)      PHYSICAL EXAM:  General:   HEENT:   Lymph Nodes:  Neck:   Respiratory:   Cardiovascular:   Abdomen:   Extremities:   Skin:   Neurological:  Psychiatry:    HOSPITAL MEDICATIONS:  MEDICATIONS  (STANDING):  ALBUTerol    90 MICROgram(s) HFA Inhaler 4 Puff(s) Inhalation every 6 hours  buDESOnide 160 MICROgram(s)/formoterol 4.5 MICROgram(s) Inhaler 2 Puff(s) Inhalation two times a day  chlorhexidine 4% Liquid 1 Application(s) Topical daily  enoxaparin Injectable 40 milliGRAM(s) SubCutaneous daily  fentaNYL   Patch  50 MICROgram(s)/Hr 1 Patch Transdermal every 72 hours  ferrous    sulfate Liquid 300 milliGRAM(s) Enteral Tube daily  folic acid 1 milliGRAM(s) Oral daily  furosemide   Injectable 40 milliGRAM(s) IV Push daily  insulin lispro (HumaLOG) corrective regimen sliding scale   SubCutaneous every 6 hours  ipratropium 17 MICROgram(s) HFA Inhaler 1 Puff(s) Inhalation every 6 hours  pantoprazole  Injectable 40 milliGRAM(s) IV Push daily  piperacillin/tazobactam IVPB. 3.375 Gram(s) IV Intermittent every 8 hours  valproic  acid Syrup 500 milliGRAM(s) Oral every 12 hours  vancomycin    Solution 125 milliGRAM(s) Enteral Tube every 6 hours  voriconazole IVPB 300 milliGRAM(s) IV Intermittent every 12 hours    MEDICATIONS  (PRN):  acetaminophen    Suspension. 650 milliGRAM(s) Enteral Tube every 6 hours PRN Mild Pain to moderate (1 - 6)  acetaminophen   Tablet 650 milliGRAM(s) Oral every 6 hours PRN For Temp greater than 38 C (100.4 F)  LORazepam   Injectable 0.5 milliGRAM(s) IV Push every 8 hours PRN Anxiety      LABS:                        7.5    11.70 )-----------( 250      ( 18 Dec 2017 06:00 )             23.7     12-19    141  |  92<L>  |  8   ----------------------------<  115<H>  3.4<L>   |  41<H>  |  0.69    Ca    8.8      19 Dec 2017 06:15                MICROBIOLOGY:     RADIOLOGY:  [ ] Reviewed and interpreted by me    PULMONARY FUNCTION TESTS:    EKG: CHIEF COMPLAINT: Patient is a 47y old  Male who presents with a chief complaint of Patient is a 47y old  Male who presents with a chief complaint of hemoptysis (09 Nov 2017 00:32) (09 Dec 2017 16:29)    Interval Events: No acute change overnight    REVIEW OF SYSTEMS:  Constitutional: Frustrated  Eyes:  ENT:  CV: Denies CP  Resp: c/o intermittent sob  GI: Wants to eat  :  MSK:  Integumentary:  Neurological:  Psychiatric:  Endocrine:  Hematologic/Lymphatic:  Allergic/Immunologic:  [x] All other systems negative  [ ] Unable to assess ROS because ________    OBJECTIVE:  ICU Vital Signs Last 24 Hrs  T(C): 37.4 (19 Dec 2017 05:00), Max: 37.8 (18 Dec 2017 17:20)  T(F): 99.4 (19 Dec 2017 05:00), Max: 100 (18 Dec 2017 17:20)  HR: 105 (19 Dec 2017 07:22) (103 - 115)  BP: 104/68 (19 Dec 2017 05:00) (104/68 - 133/90)  BP(mean): 94 (18 Dec 2017 17:20) (85 - 94)  ABP: --  ABP(mean): --  RR: 18 (19 Dec 2017 05:00) (17 - 19)  SpO2: 98% (19 Dec 2017 07:22) (98% - 100%)    Mode: AC/ CMV (Assist Control/ Continuous Mandatory Ventilation), RR (machine): 12, TV (machine): 450, FiO2: 40, PEEP: 5, MAP: 11, PIP: 31    12-18 @ 07:01  -  12-19 @ 07:00  --------------------------------------------------------  IN: 2287 mL / OUT: 1550 mL / NET: 737 mL      CAPILLARY BLOOD GLUCOSE      POCT Blood Glucose.: 112 mg/dL (19 Dec 2017 06:19)      HOSPITAL MEDICATIONS:  MEDICATIONS  (STANDING):  ALBUTerol    90 MICROgram(s) HFA Inhaler 4 Puff(s) Inhalation every 6 hours  buDESOnide 160 MICROgram(s)/formoterol 4.5 MICROgram(s) Inhaler 2 Puff(s) Inhalation two times a day  chlorhexidine 4% Liquid 1 Application(s) Topical daily  enoxaparin Injectable 40 milliGRAM(s) SubCutaneous daily  fentaNYL   Patch  50 MICROgram(s)/Hr 1 Patch Transdermal every 72 hours  ferrous    sulfate Liquid 300 milliGRAM(s) Enteral Tube daily  folic acid 1 milliGRAM(s) Oral daily  furosemide   Injectable 40 milliGRAM(s) IV Push daily  insulin lispro (HumaLOG) corrective regimen sliding scale   SubCutaneous every 6 hours  ipratropium 17 MICROgram(s) HFA Inhaler 1 Puff(s) Inhalation every 6 hours  pantoprazole  Injectable 40 milliGRAM(s) IV Push daily  piperacillin/tazobactam IVPB. 3.375 Gram(s) IV Intermittent every 8 hours  valproic  acid Syrup 500 milliGRAM(s) Oral every 12 hours  vancomycin    Solution 125 milliGRAM(s) Enteral Tube every 6 hours  voriconazole IVPB 300 milliGRAM(s) IV Intermittent every 12 hours    MEDICATIONS  (PRN):  acetaminophen    Suspension. 650 milliGRAM(s) Enteral Tube every 6 hours PRN Mild Pain to moderate (1 - 6)  acetaminophen   Tablet 650 milliGRAM(s) Oral every 6 hours PRN For Temp greater than 38 C (100.4 F)  LORazepam   Injectable 0.5 milliGRAM(s) IV Push every 8 hours PRN Anxiety      LABS:                        7.5    11.70 )-----------( 250      ( 18 Dec 2017 06:00 )             23.7     12-19    141  |  92<L>  |  8   ----------------------------<  115<H>  3.4<L>   |  41<H>  |  0.69    Ca    8.8      19 Dec 2017 06:15                MICROBIOLOGY:     RADIOLOGY:  [ ] Reviewed and interpreted by me    PULMONARY FUNCTION TESTS:    EKG:

## 2017-12-19 NOTE — PROGRESS NOTE ADULT - ASSESSMENT
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 Currently on mechanical ventilation. Required trach exchange on 12/15 for cuff leak. Being treated for c.diff as well as for Acinetobacter and Klebsiella   in tracheal aspirate.

## 2017-12-20 LAB
BASE EXCESS BLDA CALC-SCNC: 19 MMOL/L — SIGNIFICANT CHANGE UP
BUN SERPL-MCNC: 8 MG/DL — SIGNIFICANT CHANGE UP (ref 7–23)
CALCIUM SERPL-MCNC: 9.4 MG/DL — SIGNIFICANT CHANGE UP (ref 8.4–10.5)
CHLORIDE SERPL-SCNC: 93 MMOL/L — LOW (ref 98–107)
CO2 SERPL-SCNC: 43 MMOL/L — HIGH (ref 22–31)
CREAT SERPL-MCNC: 0.71 MG/DL — SIGNIFICANT CHANGE UP (ref 0.5–1.3)
GLUCOSE BLDC GLUCOMTR-MCNC: 110 MG/DL — HIGH (ref 70–99)
GLUCOSE BLDC GLUCOMTR-MCNC: 114 MG/DL — HIGH (ref 70–99)
GLUCOSE BLDC GLUCOMTR-MCNC: 97 MG/DL — SIGNIFICANT CHANGE UP (ref 70–99)
GLUCOSE SERPL-MCNC: 94 MG/DL — SIGNIFICANT CHANGE UP (ref 70–99)
HCO3 BLDA-SCNC: 42 MMOL/L — HIGH (ref 22–26)
HCT VFR BLD CALC: 28.4 % — LOW (ref 39–50)
HGB BLD-MCNC: 8.4 G/DL — LOW (ref 13–17)
MCHC RBC-ENTMCNC: 28.3 PG — SIGNIFICANT CHANGE UP (ref 27–34)
MCHC RBC-ENTMCNC: 29.6 % — LOW (ref 32–36)
MCV RBC AUTO: 95.6 FL — SIGNIFICANT CHANGE UP (ref 80–100)
NRBC # FLD: 0 — SIGNIFICANT CHANGE UP
PCO2 BLDA: 65 MMHG — HIGH (ref 35–48)
PH BLDA: 7.45 PH — SIGNIFICANT CHANGE UP (ref 7.35–7.45)
PLATELET # BLD AUTO: 285 K/UL — SIGNIFICANT CHANGE UP (ref 150–400)
PMV BLD: 12.7 FL — SIGNIFICANT CHANGE UP (ref 7–13)
PO2 BLDA: 163 MMHG — HIGH (ref 83–108)
POTASSIUM SERPL-MCNC: 4.1 MMOL/L — SIGNIFICANT CHANGE UP (ref 3.5–5.3)
POTASSIUM SERPL-SCNC: 4.1 MMOL/L — SIGNIFICANT CHANGE UP (ref 3.5–5.3)
RBC # BLD: 2.97 M/UL — LOW (ref 4.2–5.8)
RBC # FLD: 15.5 % — HIGH (ref 10.3–14.5)
SAO2 % BLDA: 99.7 % — HIGH (ref 95–99)
SODIUM SERPL-SCNC: 141 MMOL/L — SIGNIFICANT CHANGE UP (ref 135–145)
WBC # BLD: 14.9 K/UL — HIGH (ref 3.8–10.5)
WBC # FLD AUTO: 14.9 K/UL — HIGH (ref 3.8–10.5)

## 2017-12-20 PROCEDURE — 99233 SBSQ HOSP IP/OBS HIGH 50: CPT | Mod: GC

## 2017-12-20 RX ADMIN — Medication 500 MILLIGRAM(S): at 13:01

## 2017-12-20 RX ADMIN — BUDESONIDE AND FORMOTEROL FUMARATE DIHYDRATE 2 PUFF(S): 160; 4.5 AEROSOL RESPIRATORY (INHALATION) at 09:36

## 2017-12-20 RX ADMIN — Medication 40 MILLIGRAM(S): at 05:27

## 2017-12-20 RX ADMIN — Medication 500 MILLIGRAM(S): at 17:15

## 2017-12-20 RX ADMIN — Medication 500 MILLIGRAM(S): at 05:28

## 2017-12-20 RX ADMIN — VORICONAZOLE 125 MILLIGRAM(S): 10 INJECTION, POWDER, LYOPHILIZED, FOR SOLUTION INTRAVENOUS at 10:38

## 2017-12-20 RX ADMIN — BUDESONIDE AND FORMOTEROL FUMARATE DIHYDRATE 2 PUFF(S): 160; 4.5 AEROSOL RESPIRATORY (INHALATION) at 22:56

## 2017-12-20 RX ADMIN — Medication 1 PUFF(S): at 09:36

## 2017-12-20 RX ADMIN — ALBUTEROL 4 PUFF(S): 90 AEROSOL, METERED ORAL at 16:35

## 2017-12-20 RX ADMIN — Medication 1 MILLIGRAM(S): at 13:01

## 2017-12-20 RX ADMIN — Medication 1 PUFF(S): at 22:56

## 2017-12-20 RX ADMIN — ALBUTEROL 4 PUFF(S): 90 AEROSOL, METERED ORAL at 09:36

## 2017-12-20 RX ADMIN — ALBUTEROL 4 PUFF(S): 90 AEROSOL, METERED ORAL at 04:01

## 2017-12-20 RX ADMIN — Medication 1 PUFF(S): at 04:01

## 2017-12-20 RX ADMIN — VORICONAZOLE 125 MILLIGRAM(S): 10 INJECTION, POWDER, LYOPHILIZED, FOR SOLUTION INTRAVENOUS at 00:17

## 2017-12-20 RX ADMIN — Medication 500 MILLIGRAM(S): at 23:38

## 2017-12-20 RX ADMIN — Medication 500 MILLIGRAM(S): at 05:29

## 2017-12-20 RX ADMIN — Medication 300 MILLIGRAM(S): at 13:01

## 2017-12-20 RX ADMIN — Medication 1 PUFF(S): at 16:35

## 2017-12-20 RX ADMIN — Medication 500 MILLIGRAM(S): at 17:16

## 2017-12-20 RX ADMIN — VORICONAZOLE 125 MILLIGRAM(S): 10 INJECTION, POWDER, LYOPHILIZED, FOR SOLUTION INTRAVENOUS at 23:38

## 2017-12-20 RX ADMIN — ALBUTEROL 4 PUFF(S): 90 AEROSOL, METERED ORAL at 22:56

## 2017-12-20 RX ADMIN — Medication 500 MILLIGRAM(S): at 00:17

## 2017-12-20 RX ADMIN — ENOXAPARIN SODIUM 40 MILLIGRAM(S): 100 INJECTION SUBCUTANEOUS at 13:01

## 2017-12-20 RX ADMIN — CHLORHEXIDINE GLUCONATE 1 APPLICATION(S): 213 SOLUTION TOPICAL at 13:01

## 2017-12-20 RX ADMIN — Medication 0.5 MILLIGRAM(S): at 04:01

## 2017-12-20 RX ADMIN — PANTOPRAZOLE SODIUM 40 MILLIGRAM(S): 20 TABLET, DELAYED RELEASE ORAL at 13:01

## 2017-12-20 NOTE — CHART NOTE - NSCHARTNOTEFT_GEN_A_CORE
ADS NIGHT COVERAGE:    Notified by RN pt tachypneic and desat.  Pt seen and evaluated at bedside.  As per patient, denies chest pain, SOB.  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

## 2017-12-20 NOTE — PROGRESS NOTE ADULT - PROBLEM SELECTOR PLAN 6
- Vanco via PEG increase to 500mg on 12/19  - contact precautions   - Flexiseal stool still watery  - monitor stools

## 2017-12-20 NOTE — PROGRESS NOTE ADULT - SUBJECTIVE AND OBJECTIVE BOX
CHIEF COMPLAINT:    Interval Events:    REVIEW OF SYSTEMS:  Constitutional:   Eyes:  ENT:  CV:  Resp:  GI:  :  MSK:  Integumentary:  Neurological:  Psychiatric:  Endocrine:  Hematologic/Lymphatic:  Allergic/Immunologic:  [ ] All other systems negative  [ ] Unable to assess ROS because ________    OBJECTIVE:  ICU Vital Signs Last 24 Hrs  T(C): 37.6 (20 Dec 2017 08:44), Max: 37.6 (20 Dec 2017 08:44)  T(F): 99.6 (20 Dec 2017 08:44), Max: 99.6 (20 Dec 2017 08:44)  HR: 113 (20 Dec 2017 08:44) (100 - 129)  BP: 134/88 (20 Dec 2017 08:44) (113/74 - 146/88)  BP(mean): --  ABP: --  ABP(mean): --  RR: 16 (20 Dec 2017 08:44) (15 - 16)  SpO2: 100% (20 Dec 2017 08:44) (98% - 100%)    Mode: AC/ CMV (Assist Control/ Continuous Mandatory Ventilation), RR (machine): 12, TV (machine): 450, FiO2: 40, PEEP: 5, MAP: 11.3, PIP: 35    12-19 @ 07:01  -  12-20 @ 07:00  --------------------------------------------------------  IN: 300 mL / OUT: 300 mL / NET: 0 mL      CAPILLARY BLOOD GLUCOSE      POCT Blood Glucose.: 97 mg/dL (20 Dec 2017 05:51)      PHYSICAL EXAM:  General:   HEENT:   Lymph Nodes:  Neck:   Respiratory:   Cardiovascular:   Abdomen:   Extremities:   Skin:   Neurological:  Psychiatry:    HOSPITAL MEDICATIONS:  MEDICATIONS  (STANDING):  ALBUTerol    90 MICROgram(s) HFA Inhaler 4 Puff(s) Inhalation every 6 hours  buDESOnide 160 MICROgram(s)/formoterol 4.5 MICROgram(s) Inhaler 2 Puff(s) Inhalation two times a day  chlorhexidine 4% Liquid 1 Application(s) Topical daily  enoxaparin Injectable 40 milliGRAM(s) SubCutaneous daily  fentaNYL   Patch  50 MICROgram(s)/Hr 1 Patch Transdermal every 72 hours  ferrous    sulfate Liquid 300 milliGRAM(s) Enteral Tube daily  folic acid 1 milliGRAM(s) Oral daily  furosemide   Injectable 40 milliGRAM(s) IV Push daily  insulin lispro (HumaLOG) corrective regimen sliding scale   SubCutaneous every 6 hours  ipratropium 17 MICROgram(s) HFA Inhaler 1 Puff(s) Inhalation every 6 hours  levoFLOXacin IVPB 750 milliGRAM(s) IV Intermittent every 24 hours  pantoprazole  Injectable 40 milliGRAM(s) IV Push daily  valproic  acid Syrup 500 milliGRAM(s) Oral every 12 hours  vancomycin    Solution 500 milliGRAM(s) Oral every 6 hours  voriconazole IVPB 300 milliGRAM(s) IV Intermittent every 12 hours    MEDICATIONS  (PRN):  acetaminophen    Suspension. 650 milliGRAM(s) Enteral Tube every 6 hours PRN Mild Pain to moderate (1 - 6)  acetaminophen   Tablet 650 milliGRAM(s) Oral every 6 hours PRN For Temp greater than 38 C (100.4 F)  LORazepam   Injectable 0.5 milliGRAM(s) IV Push every 8 hours PRN Anxiety      LABS:                        8.4    14.90 )-----------( 285      ( 20 Dec 2017 06:00 )             28.4     12-20    141  |  93<L>  |  8   ----------------------------<  94  4.1   |  43<H>  |  0.71    Ca    9.4      20 Dec 2017 06:00          Arterial Blood Gas:  12-20 @ 04:20  7.45/65/163/42/99.7/19.0  ABG lactate: --        MICROBIOLOGY:     RADIOLOGY:  [ ] Reviewed and interpreted by me    PULMONARY FUNCTION TESTS:    EKG: CHIEF COMPLAINT: Patient is a 47y old  Male who presents with a chief complaint of Patient is a 47y old  Male who presents with a chief complaint of hemoptysis (09 Nov 2017 00:32) (09 Dec 2017 16:29)    Interval Events: none overnight, still with watery stools      REVIEW OF SYSTEMS:  Constitutional: no complaints  CV: denies  Resp: denies  [x] All other systems negative  [ ] Unable to assess ROS because ________      OBJECTIVE:  ICU Vital Signs Last 24 Hrs  T(C): 37.6 (20 Dec 2017 08:44), Max: 37.6 (20 Dec 2017 08:44)  T(F): 99.6 (20 Dec 2017 08:44), Max: 99.6 (20 Dec 2017 08:44)  HR: 113 (20 Dec 2017 08:44) (100 - 129)  BP: 134/88 (20 Dec 2017 08:44) (113/74 - 146/88)  BP(mean): --  ABP: --  ABP(mean): --  RR: 16 (20 Dec 2017 08:44) (15 - 16)  SpO2: 100% (20 Dec 2017 08:44) (98% - 100%)    Mode: AC/ CMV (Assist Control/ Continuous Mandatory Ventilation), RR (machine): 12, TV (machine): 450, FiO2: 40, PEEP: 5, MAP: 11.3, PIP: 35    12-19 @ 07:01  -  12-20 @ 07:00  --------------------------------------------------------  IN: 300 mL / OUT: 300 mL / NET: 0 mL      POCT Blood Glucose.: 97 mg/dL (20 Dec 2017 05:51)      HOSPITAL MEDICATIONS:  MEDICATIONS  (STANDING):  ALBUTerol    90 MICROgram(s) HFA Inhaler 4 Puff(s) Inhalation every 6 hours  buDESOnide 160 MICROgram(s)/formoterol 4.5 MICROgram(s) Inhaler 2 Puff(s) Inhalation two times a day  chlorhexidine 4% Liquid 1 Application(s) Topical daily  enoxaparin Injectable 40 milliGRAM(s) SubCutaneous daily  fentaNYL   Patch  50 MICROgram(s)/Hr 1 Patch Transdermal every 72 hours  ferrous    sulfate Liquid 300 milliGRAM(s) Enteral Tube daily  folic acid 1 milliGRAM(s) Oral daily  furosemide   Injectable 40 milliGRAM(s) IV Push daily  insulin lispro (HumaLOG) corrective regimen sliding scale   SubCutaneous every 6 hours  ipratropium 17 MICROgram(s) HFA Inhaler 1 Puff(s) Inhalation every 6 hours  levoFLOXacin IVPB 750 milliGRAM(s) IV Intermittent every 24 hours  pantoprazole  Injectable 40 milliGRAM(s) IV Push daily  valproic  acid Syrup 500 milliGRAM(s) Oral every 12 hours  vancomycin    Solution 500 milliGRAM(s) Oral every 6 hours  voriconazole IVPB 300 milliGRAM(s) IV Intermittent every 12 hours    MEDICATIONS  (PRN):  acetaminophen    Suspension. 650 milliGRAM(s) Enteral Tube every 6 hours PRN Mild Pain to moderate (1 - 6)  acetaminophen   Tablet 650 milliGRAM(s) Oral every 6 hours PRN For Temp greater than 38 C (100.4 F)  LORazepam   Injectable 0.5 milliGRAM(s) IV Push every 8 hours PRN Anxiety      LABS:                        8.4    14.90 )-----------( 285      ( 20 Dec 2017 06:00 )             28.4     12-20    141  |  93<L>  |  8   ----------------------------<  94  4.1   |  43<H>  |  0.71    Ca    9.4      20 Dec 2017 06:00      Arterial Blood Gas:  12-20 @ 04:20  7.45/65/163/42/99.7/19.0  ABG lactate: --

## 2017-12-20 NOTE — PROGRESS NOTE ADULT - PROBLEM SELECTOR PLAN 2
- s/p trach  - tolerating current vent settings  - will wean as tolerated (only tolerated 30 minutes CPAP yesterday)  - monitor respiratory status  - daily trach care  - suction prn

## 2017-12-20 NOTE — PROGRESS NOTE ADULT - PROBLEM SELECTOR PLAN 1
- low grade fevers persist  - cont on voriconazole (h/o aspergillosis with sarcoidosis) through 12/28 per ID  - cont levaquin for tracheal aspirate with Acinetobacter and Klebsiella  - monitor fever curve  - monitor WBC  - if spikes would add back zosyn

## 2017-12-20 NOTE — PROGRESS NOTE ADULT - ATTENDING COMMENTS
Agree with above. Patient seen and examined. Originally p/w hemoptysis now s/p complicated hospital course including lobectomy  Acute Respiratory failure in setting of hemoptysis - now s/p lobectomy - requiring full mechanical ventilation  Sarcoidosis and COPD in setting of 9/11 exposure  CVA - left sided weakness - strength improving  Oropharyngeal dysphagia - PEG feeds  Acenitobacter and Klebsiella in sputum - continue antibiotics  C. diff - still with watery stools - continue treatment now day 2 of increased Vanco dose  Eventual discharge planning to vent facility

## 2017-12-21 LAB
BUN SERPL-MCNC: 7 MG/DL — SIGNIFICANT CHANGE UP (ref 7–23)
CALCIUM SERPL-MCNC: 9.2 MG/DL — SIGNIFICANT CHANGE UP (ref 8.4–10.5)
CHLORIDE SERPL-SCNC: 96 MMOL/L — LOW (ref 98–107)
CO2 SERPL-SCNC: 42 MMOL/L — HIGH (ref 22–31)
CREAT SERPL-MCNC: 0.67 MG/DL — SIGNIFICANT CHANGE UP (ref 0.5–1.3)
GLUCOSE BLDC GLUCOMTR-MCNC: 116 MG/DL — HIGH (ref 70–99)
GLUCOSE BLDC GLUCOMTR-MCNC: 116 MG/DL — HIGH (ref 70–99)
GLUCOSE BLDC GLUCOMTR-MCNC: 119 MG/DL — HIGH (ref 70–99)
GLUCOSE BLDC GLUCOMTR-MCNC: 136 MG/DL — HIGH (ref 70–99)
GLUCOSE SERPL-MCNC: 140 MG/DL — HIGH (ref 70–99)
HCT VFR BLD CALC: 25.3 % — LOW (ref 39–50)
HGB BLD-MCNC: 7.8 G/DL — LOW (ref 13–17)
MCHC RBC-ENTMCNC: 29.4 PG — SIGNIFICANT CHANGE UP (ref 27–34)
MCHC RBC-ENTMCNC: 30.8 % — LOW (ref 32–36)
MCV RBC AUTO: 95.5 FL — SIGNIFICANT CHANGE UP (ref 80–100)
NRBC # FLD: 0 — SIGNIFICANT CHANGE UP
PLATELET # BLD AUTO: 251 K/UL — SIGNIFICANT CHANGE UP (ref 150–400)
PMV BLD: 13.3 FL — HIGH (ref 7–13)
POTASSIUM SERPL-MCNC: 4.3 MMOL/L — SIGNIFICANT CHANGE UP (ref 3.5–5.3)
POTASSIUM SERPL-SCNC: 4.3 MMOL/L — SIGNIFICANT CHANGE UP (ref 3.5–5.3)
RBC # BLD: 2.65 M/UL — LOW (ref 4.2–5.8)
RBC # FLD: 15.5 % — HIGH (ref 10.3–14.5)
SODIUM SERPL-SCNC: 144 MMOL/L — SIGNIFICANT CHANGE UP (ref 135–145)
WBC # BLD: 13.94 K/UL — HIGH (ref 3.8–10.5)
WBC # FLD AUTO: 13.94 K/UL — HIGH (ref 3.8–10.5)

## 2017-12-21 PROCEDURE — 99497 ADVNCD CARE PLAN 30 MIN: CPT

## 2017-12-21 PROCEDURE — 99233 SBSQ HOSP IP/OBS HIGH 50: CPT | Mod: 25,GC

## 2017-12-21 RX ADMIN — FENTANYL CITRATE 1 PATCH: 50 INJECTION INTRAVENOUS at 11:46

## 2017-12-21 RX ADMIN — Medication 1 MILLIGRAM(S): at 11:44

## 2017-12-21 RX ADMIN — Medication 500 MILLIGRAM(S): at 05:30

## 2017-12-21 RX ADMIN — Medication 1 PUFF(S): at 21:50

## 2017-12-21 RX ADMIN — Medication 500 MILLIGRAM(S): at 17:02

## 2017-12-21 RX ADMIN — Medication 1 PUFF(S): at 16:59

## 2017-12-21 RX ADMIN — Medication 0.5 MILLIGRAM(S): at 21:42

## 2017-12-21 RX ADMIN — Medication 300 MILLIGRAM(S): at 11:44

## 2017-12-21 RX ADMIN — Medication 1 PUFF(S): at 03:50

## 2017-12-21 RX ADMIN — ALBUTEROL 4 PUFF(S): 90 AEROSOL, METERED ORAL at 21:50

## 2017-12-21 RX ADMIN — ENOXAPARIN SODIUM 40 MILLIGRAM(S): 100 INJECTION SUBCUTANEOUS at 11:43

## 2017-12-21 RX ADMIN — ALBUTEROL 4 PUFF(S): 90 AEROSOL, METERED ORAL at 03:50

## 2017-12-21 RX ADMIN — CHLORHEXIDINE GLUCONATE 1 APPLICATION(S): 213 SOLUTION TOPICAL at 10:59

## 2017-12-21 RX ADMIN — PANTOPRAZOLE SODIUM 40 MILLIGRAM(S): 20 TABLET, DELAYED RELEASE ORAL at 11:43

## 2017-12-21 RX ADMIN — Medication 40 MILLIGRAM(S): at 05:30

## 2017-12-21 RX ADMIN — VORICONAZOLE 125 MILLIGRAM(S): 10 INJECTION, POWDER, LYOPHILIZED, FOR SOLUTION INTRAVENOUS at 11:43

## 2017-12-21 RX ADMIN — BUDESONIDE AND FORMOTEROL FUMARATE DIHYDRATE 2 PUFF(S): 160; 4.5 AEROSOL RESPIRATORY (INHALATION) at 11:06

## 2017-12-21 RX ADMIN — BUDESONIDE AND FORMOTEROL FUMARATE DIHYDRATE 2 PUFF(S): 160; 4.5 AEROSOL RESPIRATORY (INHALATION) at 21:50

## 2017-12-21 RX ADMIN — VORICONAZOLE 125 MILLIGRAM(S): 10 INJECTION, POWDER, LYOPHILIZED, FOR SOLUTION INTRAVENOUS at 23:50

## 2017-12-21 RX ADMIN — FENTANYL CITRATE 1 PATCH: 50 INJECTION INTRAVENOUS at 11:43

## 2017-12-21 RX ADMIN — Medication 1 PUFF(S): at 11:06

## 2017-12-21 RX ADMIN — Medication 500 MILLIGRAM(S): at 23:50

## 2017-12-21 RX ADMIN — ALBUTEROL 4 PUFF(S): 90 AEROSOL, METERED ORAL at 11:06

## 2017-12-21 RX ADMIN — ALBUTEROL 4 PUFF(S): 90 AEROSOL, METERED ORAL at 16:59

## 2017-12-21 RX ADMIN — Medication 500 MILLIGRAM(S): at 11:43

## 2017-12-21 NOTE — PROGRESS NOTE ADULT - SUBJECTIVE AND OBJECTIVE BOX
CHIEF COMPLAINT:    Interval Events:    REVIEW OF SYSTEMS:  Constitutional:   Eyes:  ENT:  CV:  Resp:  GI:  :  MSK:  Integumentary:  Neurological:  Psychiatric:  Endocrine:  Hematologic/Lymphatic:  Allergic/Immunologic:  [ ] All other systems negative  [ ] Unable to assess ROS because ________    OBJECTIVE:  ICU Vital Signs Last 24 Hrs  T(C): 36.8 (21 Dec 2017 09:10), Max: 37.4 (20 Dec 2017 17:13)  T(F): 98.2 (21 Dec 2017 09:10), Max: 99.3 (20 Dec 2017 17:13)  HR: 110 (21 Dec 2017 09:10) (108 - 117)  BP: 134/96 (21 Dec 2017 09:10) (111/75 - 151/90)  BP(mean): --  ABP: --  ABP(mean): --  RR: 16 (21 Dec 2017 09:10) (16 - 16)  SpO2: 100% (21 Dec 2017 09:10) (97% - 100%)    Mode: AC/ CMV (Assist Control/ Continuous Mandatory Ventilation), RR (machine): 12, TV (machine): 450, FiO2: 40, PEEP: 5, ITime: 1, MAP: 11, PIP: 34    12-20 @ 07:01 - 12-21 @ 07:00  --------------------------------------------------------  IN: 1840 mL / OUT: 1150 mL / NET: 690 mL    12-21 @ 07:01 - 12-21 @ 10:44  --------------------------------------------------------  IN: 0 mL / OUT: 800 mL / NET: -800 mL      CAPILLARY BLOOD GLUCOSE      POCT Blood Glucose.: 136 mg/dL (21 Dec 2017 05:49)      PHYSICAL EXAM:  General:   HEENT:   Lymph Nodes:  Neck:   Respiratory:   Cardiovascular:   Abdomen:   Extremities:   Skin:   Neurological:  Psychiatry:    HOSPITAL MEDICATIONS:  MEDICATIONS  (STANDING):  ALBUTerol    90 MICROgram(s) HFA Inhaler 4 Puff(s) Inhalation every 6 hours  buDESOnide 160 MICROgram(s)/formoterol 4.5 MICROgram(s) Inhaler 2 Puff(s) Inhalation two times a day  chlorhexidine 4% Liquid 1 Application(s) Topical daily  enoxaparin Injectable 40 milliGRAM(s) SubCutaneous daily  fentaNYL   Patch  50 MICROgram(s)/Hr 1 Patch Transdermal every 72 hours  ferrous    sulfate Liquid 300 milliGRAM(s) Enteral Tube daily  folic acid 1 milliGRAM(s) Oral daily  furosemide   Injectable 40 milliGRAM(s) IV Push daily  insulin lispro (HumaLOG) corrective regimen sliding scale   SubCutaneous every 6 hours  ipratropium 17 MICROgram(s) HFA Inhaler 1 Puff(s) Inhalation every 6 hours  levoFLOXacin IVPB 750 milliGRAM(s) IV Intermittent every 24 hours  pantoprazole  Injectable 40 milliGRAM(s) IV Push daily  valproic  acid Syrup 500 milliGRAM(s) Oral every 12 hours  vancomycin    Solution 500 milliGRAM(s) Oral every 6 hours  voriconazole IVPB 300 milliGRAM(s) IV Intermittent every 12 hours    MEDICATIONS  (PRN):  acetaminophen    Suspension. 650 milliGRAM(s) Enteral Tube every 6 hours PRN Mild Pain to moderate (1 - 6)  acetaminophen   Tablet 650 milliGRAM(s) Oral every 6 hours PRN For Temp greater than 38 C (100.4 F)  LORazepam   Injectable 0.5 milliGRAM(s) IV Push every 8 hours PRN Anxiety      LABS:                        7.8    13.94 )-----------( 251      ( 21 Dec 2017 05:45 )             25.3     12-21    144  |  96<L>  |  7   ----------------------------<  140<H>  4.3   |  42<H>  |  0.67    Ca    9.2      21 Dec 2017 05:45          Arterial Blood Gas:  12-20 @ 04:20  7.45/65/163/42/99.7/19.0  ABG lactate: --        MICROBIOLOGY:     RADIOLOGY:  [ ] Reviewed and interpreted by me    PULMONARY FUNCTION TESTS:    EKG: CHIEF COMPLAINT: Patient is a 47y old  Male who presents with a chief complaint of Patient is a 47y old  Male who presents with a chief complaint of hemoptysis (09 Nov 2017 00:32) (09 Dec 2017 16:29)    Interval Events: none overnight      REVIEW OF SYSTEMS:  Constitutional: no complaints  CV: denies  Resp: denies  GI: denies  [x] All other systems negative  [ ] Unable to assess ROS because ________      OBJECTIVE:  ICU Vital Signs Last 24 Hrs  T(C): 36.8 (21 Dec 2017 09:10), Max: 37.4 (20 Dec 2017 17:13)  T(F): 98.2 (21 Dec 2017 09:10), Max: 99.3 (20 Dec 2017 17:13)  HR: 110 (21 Dec 2017 09:10) (108 - 117)  BP: 134/96 (21 Dec 2017 09:10) (111/75 - 151/90)  BP(mean): --  ABP: --  ABP(mean): --  RR: 16 (21 Dec 2017 09:10) (16 - 16)  SpO2: 100% (21 Dec 2017 09:10) (97% - 100%)    Mode: AC/ CMV (Assist Control/ Continuous Mandatory Ventilation), RR (machine): 12, TV (machine): 450, FiO2: 40, PEEP: 5, ITime: 1, MAP: 11, PIP: 34    12-20 @ 07:01  -  12-21 @ 07:00  --------------------------------------------------------  IN: 1840 mL / OUT: 1150 mL / NET: 690 mL    12-21 @ 07:01  -  12-21 @ 10:44  --------------------------------------------------------  IN: 0 mL / OUT: 800 mL / NET: -800 mL      POCT Blood Glucose.: 136 mg/dL (21 Dec 2017 05:49)      HOSPITAL MEDICATIONS:  MEDICATIONS  (STANDING):  ALBUTerol    90 MICROgram(s) HFA Inhaler 4 Puff(s) Inhalation every 6 hours  buDESOnide 160 MICROgram(s)/formoterol 4.5 MICROgram(s) Inhaler 2 Puff(s) Inhalation two times a day  chlorhexidine 4% Liquid 1 Application(s) Topical daily  enoxaparin Injectable 40 milliGRAM(s) SubCutaneous daily  fentaNYL   Patch  50 MICROgram(s)/Hr 1 Patch Transdermal every 72 hours  ferrous    sulfate Liquid 300 milliGRAM(s) Enteral Tube daily  folic acid 1 milliGRAM(s) Oral daily  furosemide   Injectable 40 milliGRAM(s) IV Push daily  insulin lispro (HumaLOG) corrective regimen sliding scale   SubCutaneous every 6 hours  ipratropium 17 MICROgram(s) HFA Inhaler 1 Puff(s) Inhalation every 6 hours  levoFLOXacin IVPB 750 milliGRAM(s) IV Intermittent every 24 hours  pantoprazole  Injectable 40 milliGRAM(s) IV Push daily  valproic  acid Syrup 500 milliGRAM(s) Oral every 12 hours  vancomycin    Solution 500 milliGRAM(s) Oral every 6 hours  voriconazole IVPB 300 milliGRAM(s) IV Intermittent every 12 hours    MEDICATIONS  (PRN):  acetaminophen    Suspension. 650 milliGRAM(s) Enteral Tube every 6 hours PRN Mild Pain to moderate (1 - 6)  acetaminophen   Tablet 650 milliGRAM(s) Oral every 6 hours PRN For Temp greater than 38 C (100.4 F)  LORazepam   Injectable 0.5 milliGRAM(s) IV Push every 8 hours PRN Anxiety      LABS:                        7.8    13.94 )-----------( 251      ( 21 Dec 2017 05:45 )             25.3     12-21    144  |  96<L>  |  7   ----------------------------<  140<H>  4.3   |  42<H>  |  0.67    Ca    9.2      21 Dec 2017 05:45

## 2017-12-21 NOTE — PROGRESS NOTE ADULT - PROBLEM SELECTOR PLAN 1
- fevers improving  - cont on voriconazole (h/o aspergillosis with sarcoidosis) through 12/28 per ID  - cont levaquin for tracheal aspirate with Acinetobacter and Klebsiella  - monitor fever curve  - monitor WBC  - if spikes would add back zosyn

## 2017-12-21 NOTE — PROGRESS NOTE ADULT - ATTENDING COMMENTS
Agree with above. Patient seen and examined. Originally p/w hemoptysis now s/p complicated hospital course including lobectomy  Acute Respiratory failure in setting of hemoptysis - now s/p lobectomy - requiring full mechanical ventilation  Sarcoidosis and COPD in setting of 9/11 exposure  CVA - left sided weakness - strength improving  Oropharyngeal dysphagia - PEG feeds  Acenitobacter and Klebsiella in sputum - continue antibiotics  C. diff - with improvement in stools now somewhat creamy - continue treatment now day 3 of increased Vanco dose  Eventual discharge planning to vent facility    Advanced Care Planning - 16 minutes - discussed with patient, mother, significant other at bedside. Patient full code. Patient and family hopeful for recovery and he has had significant improvement. He is still fully vent dependent but he was able to stand and shimmy to chair with minimal assistance. Patient and family praying regularly and very hopeful for recovery.

## 2017-12-22 LAB
BUN SERPL-MCNC: 9 MG/DL — SIGNIFICANT CHANGE UP (ref 7–23)
CALCIUM SERPL-MCNC: 9.1 MG/DL — SIGNIFICANT CHANGE UP (ref 8.4–10.5)
CHLORIDE SERPL-SCNC: 94 MMOL/L — LOW (ref 98–107)
CO2 SERPL-SCNC: 44 MMOL/L — HIGH (ref 22–31)
CREAT SERPL-MCNC: 0.65 MG/DL — SIGNIFICANT CHANGE UP (ref 0.5–1.3)
GLUCOSE BLDC GLUCOMTR-MCNC: 105 MG/DL — HIGH (ref 70–99)
GLUCOSE BLDC GLUCOMTR-MCNC: 107 MG/DL — HIGH (ref 70–99)
GLUCOSE BLDC GLUCOMTR-MCNC: 117 MG/DL — HIGH (ref 70–99)
GLUCOSE SERPL-MCNC: 112 MG/DL — HIGH (ref 70–99)
HCT VFR BLD CALC: 23.6 % — LOW (ref 39–50)
HGB BLD-MCNC: 7.1 G/DL — LOW (ref 13–17)
MCHC RBC-ENTMCNC: 29.1 PG — SIGNIFICANT CHANGE UP (ref 27–34)
MCHC RBC-ENTMCNC: 30.1 % — LOW (ref 32–36)
MCV RBC AUTO: 96.7 FL — SIGNIFICANT CHANGE UP (ref 80–100)
NRBC # FLD: 0 — SIGNIFICANT CHANGE UP
PLATELET # BLD AUTO: 219 K/UL — SIGNIFICANT CHANGE UP (ref 150–400)
PMV BLD: 13.4 FL — HIGH (ref 7–13)
POTASSIUM SERPL-MCNC: 4.2 MMOL/L — SIGNIFICANT CHANGE UP (ref 3.5–5.3)
POTASSIUM SERPL-SCNC: 4.2 MMOL/L — SIGNIFICANT CHANGE UP (ref 3.5–5.3)
RBC # BLD: 2.44 M/UL — LOW (ref 4.2–5.8)
RBC # FLD: 15.5 % — HIGH (ref 10.3–14.5)
SODIUM SERPL-SCNC: 144 MMOL/L — SIGNIFICANT CHANGE UP (ref 135–145)
WBC # BLD: 8.87 K/UL — SIGNIFICANT CHANGE UP (ref 3.8–10.5)
WBC # FLD AUTO: 8.87 K/UL — SIGNIFICANT CHANGE UP (ref 3.8–10.5)

## 2017-12-22 PROCEDURE — 99233 SBSQ HOSP IP/OBS HIGH 50: CPT | Mod: GC

## 2017-12-22 RX ADMIN — Medication 500 MILLIGRAM(S): at 17:50

## 2017-12-22 RX ADMIN — Medication 1 MILLIGRAM(S): at 11:18

## 2017-12-22 RX ADMIN — Medication 500 MILLIGRAM(S): at 11:18

## 2017-12-22 RX ADMIN — Medication 500 MILLIGRAM(S): at 05:35

## 2017-12-22 RX ADMIN — CHLORHEXIDINE GLUCONATE 1 APPLICATION(S): 213 SOLUTION TOPICAL at 11:19

## 2017-12-22 RX ADMIN — ALBUTEROL 4 PUFF(S): 90 AEROSOL, METERED ORAL at 22:15

## 2017-12-22 RX ADMIN — Medication 300 MILLIGRAM(S): at 11:18

## 2017-12-22 RX ADMIN — VORICONAZOLE 125 MILLIGRAM(S): 10 INJECTION, POWDER, LYOPHILIZED, FOR SOLUTION INTRAVENOUS at 23:35

## 2017-12-22 RX ADMIN — PANTOPRAZOLE SODIUM 40 MILLIGRAM(S): 20 TABLET, DELAYED RELEASE ORAL at 11:18

## 2017-12-22 RX ADMIN — ALBUTEROL 4 PUFF(S): 90 AEROSOL, METERED ORAL at 15:12

## 2017-12-22 RX ADMIN — Medication 500 MILLIGRAM(S): at 23:35

## 2017-12-22 RX ADMIN — Medication 1 PUFF(S): at 15:12

## 2017-12-22 RX ADMIN — Medication 1 PUFF(S): at 03:44

## 2017-12-22 RX ADMIN — ALBUTEROL 4 PUFF(S): 90 AEROSOL, METERED ORAL at 03:44

## 2017-12-22 RX ADMIN — Medication 40 MILLIGRAM(S): at 05:35

## 2017-12-22 RX ADMIN — BUDESONIDE AND FORMOTEROL FUMARATE DIHYDRATE 2 PUFF(S): 160; 4.5 AEROSOL RESPIRATORY (INHALATION) at 22:15

## 2017-12-22 RX ADMIN — BUDESONIDE AND FORMOTEROL FUMARATE DIHYDRATE 2 PUFF(S): 160; 4.5 AEROSOL RESPIRATORY (INHALATION) at 09:54

## 2017-12-22 RX ADMIN — Medication 1 PUFF(S): at 09:53

## 2017-12-22 RX ADMIN — ENOXAPARIN SODIUM 40 MILLIGRAM(S): 100 INJECTION SUBCUTANEOUS at 11:18

## 2017-12-22 RX ADMIN — VORICONAZOLE 125 MILLIGRAM(S): 10 INJECTION, POWDER, LYOPHILIZED, FOR SOLUTION INTRAVENOUS at 11:19

## 2017-12-22 RX ADMIN — Medication 1 PUFF(S): at 22:15

## 2017-12-22 RX ADMIN — ALBUTEROL 4 PUFF(S): 90 AEROSOL, METERED ORAL at 09:53

## 2017-12-22 NOTE — PROGRESS NOTE ADULT - PROBLEM SELECTOR PLAN 2
- s/p trach  - tolerating current vent settings  - will wean as tolerated (only tolerated 30 minutes CPAP yesterday)  - monitor respiratory status  - daily trach care  - suction prn - s/p trach  - tolerating current vent settings  - will wean as tolerated   - monitor respiratory status  - daily trach care  - suction prn

## 2017-12-22 NOTE — PROGRESS NOTE ADULT - SUBJECTIVE AND OBJECTIVE BOX
CHIEF COMPLAINT:    Interval Events:    REVIEW OF SYSTEMS:  Constitutional:   Eyes:  ENT:  CV:  Resp:  GI:  :  MSK:  Integumentary:  Neurological:  Psychiatric:  Endocrine:  Hematologic/Lymphatic:  Allergic/Immunologic:  [ ] All other systems negative  [ ] Unable to assess ROS because ________    OBJECTIVE:  ICU Vital Signs Last 24 Hrs  T(C): 36.7 (22 Dec 2017 05:45), Max: 37.4 (21 Dec 2017 17:11)  T(F): 98.1 (22 Dec 2017 05:45), Max: 99.4 (21 Dec 2017 17:11)  HR: 108 (22 Dec 2017 07:32) (105 - 126)  BP: 122/76 (22 Dec 2017 05:45) (121/90 - 134/96)  BP(mean): --  ABP: --  ABP(mean): --  RR: 18 (22 Dec 2017 05:45) (16 - 18)  SpO2: 100% (22 Dec 2017 07:32) (97% - 100%)    Mode: AC/ CMV (Assist Control/ Continuous Mandatory Ventilation), RR (machine): 12, TV (machine): 450, FiO2: 40, PEEP: 5, MAP: 12, PIP: 38    12-21 @ 07:01  -  12-22 @ 07:00  --------------------------------------------------------  IN: 1020 mL / OUT: 1100 mL / NET: -80 mL      CAPILLARY BLOOD GLUCOSE      POCT Blood Glucose.: 117 mg/dL (22 Dec 2017 04:59)        HOSPITAL MEDICATIONS:  MEDICATIONS  (STANDING):  ALBUTerol    90 MICROgram(s) HFA Inhaler 4 Puff(s) Inhalation every 6 hours  buDESOnide 160 MICROgram(s)/formoterol 4.5 MICROgram(s) Inhaler 2 Puff(s) Inhalation two times a day  chlorhexidine 4% Liquid 1 Application(s) Topical daily  enoxaparin Injectable 40 milliGRAM(s) SubCutaneous daily  ferrous    sulfate Liquid 300 milliGRAM(s) Enteral Tube daily  folic acid 1 milliGRAM(s) Oral daily  furosemide   Injectable 40 milliGRAM(s) IV Push daily  insulin lispro (HumaLOG) corrective regimen sliding scale   SubCutaneous every 6 hours  ipratropium 17 MICROgram(s) HFA Inhaler 1 Puff(s) Inhalation every 6 hours  levoFLOXacin IVPB 750 milliGRAM(s) IV Intermittent every 24 hours  pantoprazole  Injectable 40 milliGRAM(s) IV Push daily  valproic  acid Syrup 500 milliGRAM(s) Oral every 12 hours  vancomycin    Solution 500 milliGRAM(s) Oral every 6 hours  voriconazole IVPB 300 milliGRAM(s) IV Intermittent every 12 hours    MEDICATIONS  (PRN):  acetaminophen    Suspension. 650 milliGRAM(s) Enteral Tube every 6 hours PRN Mild Pain to moderate (1 - 6)  acetaminophen   Tablet 650 milliGRAM(s) Oral every 6 hours PRN For Temp greater than 38 C (100.4 F)  LORazepam   Injectable 0.5 milliGRAM(s) IV Push every 8 hours PRN Anxiety      LABS:                        7.1    8.87  )-----------( 219      ( 22 Dec 2017 05:07 )             23.6     12-22    144  |  94<L>  |  9   ----------------------------<  112<H>  4.2   |  44<H>  |  0.65    Ca    9.1      22 Dec 2017 05:07                MICROBIOLOGY:     RADIOLOGY:  [ ] Reviewed and interpreted by me    PULMONARY FUNCTION TESTS:    EKG: CHIEF COMPLAINT:  feels tired tody      Interval Events: stable overnight    REVIEW OF SYSTEMS:  Constitutional: feels tired  CV: Denies chest pain  Resp: denies SOB  [x] All other systems negative      OBJECTIVE:  ICU Vital Signs Last 24 Hrs  T(C): 36.7 (22 Dec 2017 05:45), Max: 37.4 (21 Dec 2017 17:11)  T(F): 98.1 (22 Dec 2017 05:45), Max: 99.4 (21 Dec 2017 17:11)  HR: 108 (22 Dec 2017 07:32) (105 - 126)  BP: 122/76 (22 Dec 2017 05:45) (121/90 - 134/96)  RR: 18 (22 Dec 2017 05:45) (16 - 18)  SpO2: 100% (22 Dec 2017 07:32) (97% - 100%)    Mode: AC/ CMV (Assist Control/ Continuous Mandatory Ventilation), RR (machine): 12, TV (machine): 450, FiO2: 40, PEEP: 5, MAP: 12, PIP: 38    12-21 @ 07:01  -  12-22 @ 07:00  --------------------------------------------------------  IN: 1020 mL / OUT: 1100 mL / NET: -80 mL      POCT Blood Glucose.: 117 mg/dL (22 Dec 2017 04:59)        HOSPITAL MEDICATIONS:  MEDICATIONS  (STANDING):  ALBUTerol    90 MICROgram(s) HFA Inhaler 4 Puff(s) Inhalation every 6 hours  buDESOnide 160 MICROgram(s)/formoterol 4.5 MICROgram(s) Inhaler 2 Puff(s) Inhalation two times a day  chlorhexidine 4% Liquid 1 Application(s) Topical daily  enoxaparin Injectable 40 milliGRAM(s) SubCutaneous daily  ferrous    sulfate Liquid 300 milliGRAM(s) Enteral Tube daily  folic acid 1 milliGRAM(s) Oral daily  furosemide   Injectable 40 milliGRAM(s) IV Push daily  insulin lispro (HumaLOG) corrective regimen sliding scale   SubCutaneous every 6 hours  ipratropium 17 MICROgram(s) HFA Inhaler 1 Puff(s) Inhalation every 6 hours  levoFLOXacin IVPB 750 milliGRAM(s) IV Intermittent every 24 hours  pantoprazole  Injectable 40 milliGRAM(s) IV Push daily  valproic  acid Syrup 500 milliGRAM(s) Oral every 12 hours  vancomycin    Solution 500 milliGRAM(s) Oral every 6 hours  voriconazole IVPB 300 milliGRAM(s) IV Intermittent every 12 hours    MEDICATIONS  (PRN):  acetaminophen    Suspension. 650 milliGRAM(s) Enteral Tube every 6 hours PRN Mild Pain to moderate (1 - 6)  acetaminophen   Tablet 650 milliGRAM(s) Oral every 6 hours PRN For Temp greater than 38 C (100.4 F)  LORazepam   Injectable 0.5 milliGRAM(s) IV Push every 8 hours PRN Anxiety      LABS:                        7.1    8.87  )-----------( 219      ( 22 Dec 2017 05:07 )             23.6     12-22    144  |  94<L>  |  9   ----------------------------<  112<H>  4.2   |  44<H>  |  0.65    Ca    9.1      22 Dec 2017 05:07

## 2017-12-22 NOTE — PROGRESS NOTE ADULT - PROBLEM SELECTOR PLAN 6
- Vanco via PEG increase to 500mg on 12/19  - contact precautions   - Flexiseal stool still watery  - monitor stools - lovenox

## 2017-12-22 NOTE — PROGRESS NOTE ADULT - ATTENDING COMMENTS
Agree with above. Patient seen and examined. Originally p/w hemoptysis now s/p complicated hospital course including lobectomy  Acute Respiratory failure in setting of hemoptysis - now s/p lobectomy - requiring full mechanical ventilation  Sarcoidosis and COPD in setting of 9/11 exposure  CVA - left sided weakness - strength improving  Oropharyngeal dysphagia - PEG feeds  Acenitobacter and Klebsiella in sputum - continue antibiotics  C. diff - with improvement in stools now somewhat creamy - continue treatment now day 4 of increased Vanco dose  Eventual discharge planning to vent facility

## 2017-12-22 NOTE — PROGRESS NOTE ADULT - PROBLEM SELECTOR PLAN 5
- cont bronchodilators   - cont nebs - Vanco via PEG increase to 500mg on 12/19  - contact precautions   - Flexiseal stool creamy consistency  - monitor stools

## 2017-12-22 NOTE — CHART NOTE - NSCHARTNOTEFT_GEN_A_CORE
Source: nursing & EMR    Diet : NPO with tube feed - Two Ben HN @ 35 ml/hr 24hrs  and no carb Pro source 1 pack daily     Patient oriented , w/ tracheostomy, unable to speak, , noted 1+ R & L foot and ankle edema , loose stools improving, fecal incontinence present , also noted pressure ulcers - sacrum stage 2 and tracheal area stage 2.      Enteral : EN provide 1680 kcal & 85 gm protein/d - adequate       Current Weight: - no wt. available per flow sheet , bed scale not zeroed ; 99.7 kg on 12/7/17 ; Dry wt. - 75 kg     Pertinent Medications: MEDICATIONS  (STANDING):    ALBUTerol    90 MICROgram(s) HFA Inhaler 4 Puff(s) Inhalation every 6 hours  buDESOnide 160 MICROgram(s)/formoterol 4.5 MICROgram(s) Inhaler 2 Puff(s) Inhalation two times a day  chlorhexidine 4% Liquid 1 Application(s) Topical daily  enoxaparin Injectable 40 milliGRAM(s) SubCutaneous daily  ferrous    sulfate Liquid 300 milliGRAM(s) Enteral Tube daily  folic acid 1 milliGRAM(s) Oral daily  furosemide   Injectable 40 milliGRAM(s) IV Push daily  insulin lispro (HumaLOG) corrective regimen sliding scale   SubCutaneous every 6 hours  ipratropium 17 MICROgram(s) HFA Inhaler 1 Puff(s) Inhalation every 6 hours  levoFLOXacin IVPB 750 milliGRAM(s) IV Intermittent every 24 hours  pantoprazole  Injectable 40 milliGRAM(s) IV Push daily  valproic  acid Syrup 500 milliGRAM(s) Oral every 12 hours  vancomycin    Solution 500 milliGRAM(s) Oral every 6 hours  voriconazole IVPB 300 milliGRAM(s) IV Intermittent every 12 hours    MEDICATIONS  (PRN):  acetaminophen    Suspension. 650 milliGRAM(s) Enteral Tube every 6 hours PRN Mild Pain to moderate (1 - 6)  acetaminophen   Tablet 650 milliGRAM(s) Oral every 6 hours PRN For Temp greater than 38 C (100.4 F)  LORazepam   Injectable 0.5 milliGRAM(s) IV Push every 8 hours PRN Anxiety    Pertinent Labs:  12-22 Na144 mmol/L Glu 112 mg/dL<H> K+ 4.2 mmol/L Cr  0.65 mg/dL BUN 9 mg/dL Phos n/a   Alb n/a   PAB n/a             Estimated Needs:  1500 - 1878 kcal ( 20 - 25 kcal/kg dry wt. ) protein 75 - 90 gm/d ( 1.0 - 1.2 gm/kg dry wt. ) - no change since previous assessment    Nutrition optimized with EN support     Recommend to continue EN       Monitoring and Evaluation:     1. Tolerance to diet prescription   2. weights   3. follow up per protocol

## 2017-12-23 LAB
BUN SERPL-MCNC: 8 MG/DL — SIGNIFICANT CHANGE UP (ref 7–23)
CALCIUM SERPL-MCNC: 9.1 MG/DL — SIGNIFICANT CHANGE UP (ref 8.4–10.5)
CHLORIDE SERPL-SCNC: 89 MMOL/L — LOW (ref 98–107)
CO2 SERPL-SCNC: 44 MMOL/L — HIGH (ref 22–31)
CREAT SERPL-MCNC: 0.66 MG/DL — SIGNIFICANT CHANGE UP (ref 0.5–1.3)
GLUCOSE BLDC GLUCOMTR-MCNC: 105 MG/DL — HIGH (ref 70–99)
GLUCOSE BLDC GLUCOMTR-MCNC: 108 MG/DL — HIGH (ref 70–99)
GLUCOSE BLDC GLUCOMTR-MCNC: 124 MG/DL — HIGH (ref 70–99)
GLUCOSE BLDC GLUCOMTR-MCNC: 150 MG/DL — HIGH (ref 70–99)
GLUCOSE SERPL-MCNC: 123 MG/DL — HIGH (ref 70–99)
HCT VFR BLD CALC: 26 % — LOW (ref 39–50)
HGB BLD-MCNC: 7.7 G/DL — LOW (ref 13–17)
MAGNESIUM SERPL-MCNC: 1.8 MG/DL — SIGNIFICANT CHANGE UP (ref 1.6–2.6)
MCHC RBC-ENTMCNC: 28.5 PG — SIGNIFICANT CHANGE UP (ref 27–34)
MCHC RBC-ENTMCNC: 29.6 % — LOW (ref 32–36)
MCV RBC AUTO: 96.3 FL — SIGNIFICANT CHANGE UP (ref 80–100)
NRBC # FLD: 0 — SIGNIFICANT CHANGE UP
PHOSPHATE SERPL-MCNC: 4.3 MG/DL — SIGNIFICANT CHANGE UP (ref 2.5–4.5)
PLATELET # BLD AUTO: 267 K/UL — SIGNIFICANT CHANGE UP (ref 150–400)
PMV BLD: 13.2 FL — HIGH (ref 7–13)
POTASSIUM SERPL-MCNC: 4 MMOL/L — SIGNIFICANT CHANGE UP (ref 3.5–5.3)
POTASSIUM SERPL-SCNC: 4 MMOL/L — SIGNIFICANT CHANGE UP (ref 3.5–5.3)
RBC # BLD: 2.7 M/UL — LOW (ref 4.2–5.8)
RBC # FLD: 15.5 % — HIGH (ref 10.3–14.5)
SODIUM SERPL-SCNC: 140 MMOL/L — SIGNIFICANT CHANGE UP (ref 135–145)
WBC # BLD: 11.6 K/UL — HIGH (ref 3.8–10.5)
WBC # FLD AUTO: 11.6 K/UL — HIGH (ref 3.8–10.5)

## 2017-12-23 PROCEDURE — 99233 SBSQ HOSP IP/OBS HIGH 50: CPT | Mod: GC

## 2017-12-23 RX ORDER — LANOLIN ALCOHOL/MO/W.PET/CERES
3 CREAM (GRAM) TOPICAL AT BEDTIME
Qty: 0 | Refills: 0 | Status: DISCONTINUED | OUTPATIENT
Start: 2017-12-23 | End: 2018-01-10

## 2017-12-23 RX ORDER — LANOLIN ALCOHOL/MO/W.PET/CERES
3 CREAM (GRAM) TOPICAL AT BEDTIME
Qty: 0 | Refills: 0 | Status: DISCONTINUED | OUTPATIENT
Start: 2017-12-23 | End: 2017-12-23

## 2017-12-23 RX ADMIN — ALBUTEROL 4 PUFF(S): 90 AEROSOL, METERED ORAL at 22:05

## 2017-12-23 RX ADMIN — Medication 1 PUFF(S): at 22:05

## 2017-12-23 RX ADMIN — Medication 500 MILLIGRAM(S): at 11:50

## 2017-12-23 RX ADMIN — Medication 1 PUFF(S): at 15:48

## 2017-12-23 RX ADMIN — CHLORHEXIDINE GLUCONATE 1 APPLICATION(S): 213 SOLUTION TOPICAL at 11:50

## 2017-12-23 RX ADMIN — Medication 1 PUFF(S): at 10:42

## 2017-12-23 RX ADMIN — PANTOPRAZOLE SODIUM 40 MILLIGRAM(S): 20 TABLET, DELAYED RELEASE ORAL at 11:49

## 2017-12-23 RX ADMIN — ALBUTEROL 4 PUFF(S): 90 AEROSOL, METERED ORAL at 10:42

## 2017-12-23 RX ADMIN — Medication 0.5 MILLIGRAM(S): at 23:03

## 2017-12-23 RX ADMIN — Medication 500 MILLIGRAM(S): at 17:24

## 2017-12-23 RX ADMIN — ALBUTEROL 4 PUFF(S): 90 AEROSOL, METERED ORAL at 03:58

## 2017-12-23 RX ADMIN — BUDESONIDE AND FORMOTEROL FUMARATE DIHYDRATE 2 PUFF(S): 160; 4.5 AEROSOL RESPIRATORY (INHALATION) at 10:41

## 2017-12-23 RX ADMIN — Medication 40 MILLIGRAM(S): at 05:15

## 2017-12-23 RX ADMIN — VORICONAZOLE 125 MILLIGRAM(S): 10 INJECTION, POWDER, LYOPHILIZED, FOR SOLUTION INTRAVENOUS at 11:49

## 2017-12-23 RX ADMIN — Medication 500 MILLIGRAM(S): at 05:16

## 2017-12-23 RX ADMIN — Medication 1 PUFF(S): at 03:58

## 2017-12-23 RX ADMIN — ENOXAPARIN SODIUM 40 MILLIGRAM(S): 100 INJECTION SUBCUTANEOUS at 11:49

## 2017-12-23 RX ADMIN — ALBUTEROL 4 PUFF(S): 90 AEROSOL, METERED ORAL at 15:47

## 2017-12-23 RX ADMIN — Medication 300 MILLIGRAM(S): at 11:50

## 2017-12-23 RX ADMIN — Medication 1 MILLIGRAM(S): at 11:50

## 2017-12-23 RX ADMIN — Medication 3 MILLIGRAM(S): at 01:02

## 2017-12-23 RX ADMIN — BUDESONIDE AND FORMOTEROL FUMARATE DIHYDRATE 2 PUFF(S): 160; 4.5 AEROSOL RESPIRATORY (INHALATION) at 22:05

## 2017-12-23 RX ADMIN — Medication 500 MILLIGRAM(S): at 05:15

## 2017-12-23 RX ADMIN — Medication 500 MILLIGRAM(S): at 23:03

## 2017-12-23 NOTE — PROGRESS NOTE ADULT - EXTREMITIES COMMENTS
Edema improving
3+ generalized pitting edema
3+ left sided pitting edema, Right foot with 3+ pitting edema, Left arm with 2+ pitting edema
2+ extremity edema
Edema improving. Left hand/arm with 1+ edema. Left hand/arm with 2+ edema. b/l lower legs and feet with 3+ edema
2+ edema
1+ edema right arm. 2+ edema left arm. 3+ edema b/l lower legs and feet
1+ pedal edema
Bilateral lower extremity edema
lower extremity edema

## 2017-12-23 NOTE — PROGRESS NOTE ADULT - MOTOR
left sided weakness due to CVA
bilateral weakness left greater than right
generalized weakness, L>R
left sided weakness 2/2 CVA

## 2017-12-23 NOTE — PROGRESS NOTE ADULT - PROBLEM SELECTOR PLAN 5
- Vanco via PEG increase to 500mg on 12/19  - contact precautions   - Flexiseal stool creamy consistency  - monitor stools - Vanco via PEG increase to 500mg on 12/19  - contact precautions   - stool creamy consistency  - monitor stools

## 2017-12-23 NOTE — PROGRESS NOTE ADULT - ATTENDING COMMENTS
Hemodyn stable, afebrile, stable resp status on current vent settings  levaquin, vori, po vanco  still with loose stools  dispo planning to vent facility

## 2017-12-23 NOTE — PROGRESS NOTE ADULT - PROBLEM SELECTOR PLAN 2
- s/p trach  - tolerating current vent settings  - will wean as tolerated   - monitor respiratory status  - daily trach care  - suction prn

## 2017-12-23 NOTE — PROGRESS NOTE ADULT - SUBJECTIVE AND OBJECTIVE BOX
CHIEF COMPLAINT:    Interval Events:    REVIEW OF SYSTEMS:  Constitutional:   Eyes:  ENT:  CV:  Resp:  GI:  :  MSK:  Integumentary:  Neurological:  Psychiatric:  Endocrine:  Hematologic/Lymphatic:  Allergic/Immunologic:  [ ] All other systems negative  [ ] Unable to assess ROS because ________    OBJECTIVE:  ICU Vital Signs Last 24 Hrs  T(C): 37.2 (23 Dec 2017 05:13), Max: 37.7 (22 Dec 2017 17:46)  T(F): 99 (23 Dec 2017 05:13), Max: 99.8 (22 Dec 2017 17:46)  HR: 107 (23 Dec 2017 05:13) (105 - 118)  BP: 128/89 (23 Dec 2017 05:13) (124/77 - 140/93)  BP(mean): --  ABP: --  ABP(mean): --  RR: 14 (23 Dec 2017 05:13) (14 - 15)  SpO2: 100% (23 Dec 2017 05:13) (99% - 100%)    Mode: AC/ CMV (Assist Control/ Continuous Mandatory Ventilation), RR (machine): 12, TV (machine): 450, FiO2: 40, PEEP: 5, MAP: 10, PIP: 34    12-22 @ 07:01  -  12-23 @ 07:00  --------------------------------------------------------  IN: 600 mL / OUT: 0 mL / NET: 600 mL      CAPILLARY BLOOD GLUCOSE      POCT Blood Glucose.: 150 mg/dL (23 Dec 2017 06:03)        HOSPITAL MEDICATIONS:  MEDICATIONS  (STANDING):  ALBUTerol    90 MICROgram(s) HFA Inhaler 4 Puff(s) Inhalation every 6 hours  buDESOnide 160 MICROgram(s)/formoterol 4.5 MICROgram(s) Inhaler 2 Puff(s) Inhalation two times a day  chlorhexidine 4% Liquid 1 Application(s) Topical daily  enoxaparin Injectable 40 milliGRAM(s) SubCutaneous daily  ferrous    sulfate Liquid 300 milliGRAM(s) Enteral Tube daily  folic acid 1 milliGRAM(s) Oral daily  furosemide   Injectable 40 milliGRAM(s) IV Push daily  insulin lispro (HumaLOG) corrective regimen sliding scale   SubCutaneous every 6 hours  ipratropium 17 MICROgram(s) HFA Inhaler 1 Puff(s) Inhalation every 6 hours  levoFLOXacin IVPB 750 milliGRAM(s) IV Intermittent every 24 hours  melatonin 3 milliGRAM(s) Oral at bedtime  pantoprazole  Injectable 40 milliGRAM(s) IV Push daily  valproic  acid Syrup 500 milliGRAM(s) Oral every 12 hours  vancomycin    Solution 500 milliGRAM(s) Oral every 6 hours  voriconazole IVPB 300 milliGRAM(s) IV Intermittent every 12 hours    MEDICATIONS  (PRN):  acetaminophen    Suspension. 650 milliGRAM(s) Enteral Tube every 6 hours PRN Mild Pain to moderate (1 - 6)  acetaminophen   Tablet 650 milliGRAM(s) Oral every 6 hours PRN For Temp greater than 38 C (100.4 F)  LORazepam   Injectable 0.5 milliGRAM(s) IV Push every 8 hours PRN Anxiety      LABS:                        7.1    8.87  )-----------( 219      ( 22 Dec 2017 05:07 )             23.6     12-22    144  |  94<L>  |  9   ----------------------------<  112<H>  4.2   |  44<H>  |  0.65    Ca    9.1      22 Dec 2017 05:07                MICROBIOLOGY:     RADIOLOGY:  [ ] Reviewed and interpreted by me    PULMONARY FUNCTION TESTS:    EKG: CHIEF COMPLAINT: no complaints    Interval Events: stable overnight, stools more formed    REVIEW OF SYSTEMS:  Constitutional: denies  CV: denies chest pain  Resp: denies SOB  GI: no abd cramping  Psychiatric: denies  [x] All other systems negative    OBJECTIVE:  ICU Vital Signs Last 24 Hrs  T(C): 37.2 (23 Dec 2017 05:13), Max: 37.7 (22 Dec 2017 17:46)  T(F): 99 (23 Dec 2017 05:13), Max: 99.8 (22 Dec 2017 17:46)  HR: 107 (23 Dec 2017 05:13) (105 - 118)  BP: 128/89 (23 Dec 2017 05:13) (124/77 - 140/93)  RR: 14 (23 Dec 2017 05:13) (14 - 15)  SpO2: 100% (23 Dec 2017 05:13) (99% - 100%)    Mode: AC/ CMV (Assist Control/ Continuous Mandatory Ventilation), RR (machine): 12, TV (machine): 450, FiO2: 40, PEEP: 5, MAP: 10, PIP: 34    12-22 @ 07:01  -  12-23 @ 07:00  --------------------------------------------------------  IN: 600 mL / OUT: 0 mL / NET: 600 mL      POCT Blood Glucose.: 150 mg/dL (23 Dec 2017 06:03)        HOSPITAL MEDICATIONS:  MEDICATIONS  (STANDING):  ALBUTerol    90 MICROgram(s) HFA Inhaler 4 Puff(s) Inhalation every 6 hours  buDESOnide 160 MICROgram(s)/formoterol 4.5 MICROgram(s) Inhaler 2 Puff(s) Inhalation two times a day  chlorhexidine 4% Liquid 1 Application(s) Topical daily  enoxaparin Injectable 40 milliGRAM(s) SubCutaneous daily  ferrous    sulfate Liquid 300 milliGRAM(s) Enteral Tube daily  folic acid 1 milliGRAM(s) Oral daily  furosemide   Injectable 40 milliGRAM(s) IV Push daily  insulin lispro (HumaLOG) corrective regimen sliding scale   SubCutaneous every 6 hours  ipratropium 17 MICROgram(s) HFA Inhaler 1 Puff(s) Inhalation every 6 hours  levoFLOXacin IVPB 750 milliGRAM(s) IV Intermittent every 24 hours  melatonin 3 milliGRAM(s) Oral at bedtime  pantoprazole  Injectable 40 milliGRAM(s) IV Push daily  valproic  acid Syrup 500 milliGRAM(s) Oral every 12 hours  vancomycin    Solution 500 milliGRAM(s) Oral every 6 hours  voriconazole IVPB 300 milliGRAM(s) IV Intermittent every 12 hours    MEDICATIONS  (PRN):  acetaminophen    Suspension. 650 milliGRAM(s) Enteral Tube every 6 hours PRN Mild Pain to moderate (1 - 6)  acetaminophen   Tablet 650 milliGRAM(s) Oral every 6 hours PRN For Temp greater than 38 C (100.4 F)  LORazepam   Injectable 0.5 milliGRAM(s) IV Push every 8 hours PRN Anxiety      LABS:                        7.7    11.60 )-----------( 267      ( 23 Dec 2017 07:24 )             26.0             12-23    140  |  89<L>  |  8   ----------------------------<  123<H>  4.0   |  44<H>  |  0.66    Ca    9.1      23 Dec 2017 07:24  Phos  4.3     12-23  Mg     1.8     12-23

## 2017-12-24 LAB
BASE EXCESS BLDA CALC-SCNC: 21.6 MMOL/L — SIGNIFICANT CHANGE UP
BUN SERPL-MCNC: 8 MG/DL — SIGNIFICANT CHANGE UP (ref 7–23)
CALCIUM SERPL-MCNC: 8.9 MG/DL — SIGNIFICANT CHANGE UP (ref 8.4–10.5)
CHLORIDE SERPL-SCNC: 92 MMOL/L — LOW (ref 98–107)
CO2 SERPL-SCNC: 45 MMOL/L — CRITICAL HIGH (ref 22–31)
CREAT SERPL-MCNC: 0.6 MG/DL — SIGNIFICANT CHANGE UP (ref 0.5–1.3)
GLUCOSE BLDC GLUCOMTR-MCNC: 106 MG/DL — HIGH (ref 70–99)
GLUCOSE BLDC GLUCOMTR-MCNC: 111 MG/DL — HIGH (ref 70–99)
GLUCOSE BLDC GLUCOMTR-MCNC: 89 MG/DL — SIGNIFICANT CHANGE UP (ref 70–99)
GLUCOSE BLDC GLUCOMTR-MCNC: 96 MG/DL — SIGNIFICANT CHANGE UP (ref 70–99)
GLUCOSE SERPL-MCNC: 121 MG/DL — HIGH (ref 70–99)
HCO3 BLDA-SCNC: 45 MMOL/L — CRITICAL HIGH (ref 22–26)
HCT VFR BLD CALC: 24.7 % — LOW (ref 39–50)
HGB BLD-MCNC: 7.3 G/DL — LOW (ref 13–17)
MAGNESIUM SERPL-MCNC: 2 MG/DL — SIGNIFICANT CHANGE UP (ref 1.6–2.6)
MCHC RBC-ENTMCNC: 28.1 PG — SIGNIFICANT CHANGE UP (ref 27–34)
MCHC RBC-ENTMCNC: 29.6 % — LOW (ref 32–36)
MCV RBC AUTO: 95 FL — SIGNIFICANT CHANGE UP (ref 80–100)
NRBC # FLD: 0 — SIGNIFICANT CHANGE UP
PCO2 BLDA: 69 MMHG — HIGH (ref 35–48)
PH BLDA: 7.45 PH — SIGNIFICANT CHANGE UP (ref 7.35–7.45)
PHOSPHATE SERPL-MCNC: 3.8 MG/DL — SIGNIFICANT CHANGE UP (ref 2.5–4.5)
PLATELET # BLD AUTO: 264 K/UL — SIGNIFICANT CHANGE UP (ref 150–400)
PMV BLD: 13.2 FL — HIGH (ref 7–13)
PO2 BLDA: 164 MMHG — HIGH (ref 83–108)
POTASSIUM SERPL-MCNC: 3.9 MMOL/L — SIGNIFICANT CHANGE UP (ref 3.5–5.3)
POTASSIUM SERPL-SCNC: 3.9 MMOL/L — SIGNIFICANT CHANGE UP (ref 3.5–5.3)
RBC # BLD: 2.6 M/UL — LOW (ref 4.2–5.8)
RBC # FLD: 15.3 % — HIGH (ref 10.3–14.5)
SAO2 % BLDA: 99.6 % — HIGH (ref 95–99)
SODIUM SERPL-SCNC: 143 MMOL/L — SIGNIFICANT CHANGE UP (ref 135–145)
WBC # BLD: 11.01 K/UL — HIGH (ref 3.8–10.5)
WBC # FLD AUTO: 11.01 K/UL — HIGH (ref 3.8–10.5)

## 2017-12-24 PROCEDURE — 99233 SBSQ HOSP IP/OBS HIGH 50: CPT | Mod: GC

## 2017-12-24 RX ORDER — FENTANYL CITRATE 50 UG/ML
1 INJECTION INTRAVENOUS
Qty: 0 | Refills: 0 | Status: DISCONTINUED | OUTPATIENT
Start: 2017-12-24 | End: 2017-12-28

## 2017-12-24 RX ADMIN — Medication 1 MILLIGRAM(S): at 11:34

## 2017-12-24 RX ADMIN — VORICONAZOLE 125 MILLIGRAM(S): 10 INJECTION, POWDER, LYOPHILIZED, FOR SOLUTION INTRAVENOUS at 21:35

## 2017-12-24 RX ADMIN — FENTANYL CITRATE 1 PATCH: 50 INJECTION INTRAVENOUS at 14:51

## 2017-12-24 RX ADMIN — FENTANYL CITRATE 1 PATCH: 50 INJECTION INTRAVENOUS at 11:35

## 2017-12-24 RX ADMIN — VORICONAZOLE 125 MILLIGRAM(S): 10 INJECTION, POWDER, LYOPHILIZED, FOR SOLUTION INTRAVENOUS at 00:01

## 2017-12-24 RX ADMIN — Medication 500 MILLIGRAM(S): at 18:02

## 2017-12-24 RX ADMIN — BUDESONIDE AND FORMOTEROL FUMARATE DIHYDRATE 2 PUFF(S): 160; 4.5 AEROSOL RESPIRATORY (INHALATION) at 22:56

## 2017-12-24 RX ADMIN — Medication 3 MILLIGRAM(S): at 21:35

## 2017-12-24 RX ADMIN — Medication 300 MILLIGRAM(S): at 11:34

## 2017-12-24 RX ADMIN — Medication 500 MILLIGRAM(S): at 06:33

## 2017-12-24 RX ADMIN — Medication 1 PUFF(S): at 10:03

## 2017-12-24 RX ADMIN — ENOXAPARIN SODIUM 40 MILLIGRAM(S): 100 INJECTION SUBCUTANEOUS at 11:34

## 2017-12-24 RX ADMIN — Medication 500 MILLIGRAM(S): at 06:34

## 2017-12-24 RX ADMIN — VORICONAZOLE 125 MILLIGRAM(S): 10 INJECTION, POWDER, LYOPHILIZED, FOR SOLUTION INTRAVENOUS at 11:42

## 2017-12-24 RX ADMIN — Medication 1 PUFF(S): at 15:55

## 2017-12-24 RX ADMIN — BUDESONIDE AND FORMOTEROL FUMARATE DIHYDRATE 2 PUFF(S): 160; 4.5 AEROSOL RESPIRATORY (INHALATION) at 10:03

## 2017-12-24 RX ADMIN — Medication 0.5 MILLIGRAM(S): at 23:34

## 2017-12-24 RX ADMIN — Medication 500 MILLIGRAM(S): at 11:34

## 2017-12-24 RX ADMIN — ALBUTEROL 4 PUFF(S): 90 AEROSOL, METERED ORAL at 03:57

## 2017-12-24 RX ADMIN — ALBUTEROL 4 PUFF(S): 90 AEROSOL, METERED ORAL at 10:03

## 2017-12-24 RX ADMIN — Medication 40 MILLIGRAM(S): at 06:32

## 2017-12-24 RX ADMIN — ALBUTEROL 4 PUFF(S): 90 AEROSOL, METERED ORAL at 15:55

## 2017-12-24 RX ADMIN — Medication 1 PUFF(S): at 22:56

## 2017-12-24 RX ADMIN — CHLORHEXIDINE GLUCONATE 1 APPLICATION(S): 213 SOLUTION TOPICAL at 11:33

## 2017-12-24 RX ADMIN — Medication 1 PUFF(S): at 03:58

## 2017-12-24 RX ADMIN — Medication 500 MILLIGRAM(S): at 18:01

## 2017-12-24 RX ADMIN — PANTOPRAZOLE SODIUM 40 MILLIGRAM(S): 20 TABLET, DELAYED RELEASE ORAL at 11:34

## 2017-12-24 RX ADMIN — Medication 0.5 MILLIGRAM(S): at 14:51

## 2017-12-24 RX ADMIN — ALBUTEROL 4 PUFF(S): 90 AEROSOL, METERED ORAL at 22:56

## 2017-12-24 NOTE — PROGRESS NOTE ADULT - PROBLEM SELECTOR PLAN 5
- Vanco via PEG increase to 500mg on 12/19  - contact precautions   - stool still not formed  - monitor stools

## 2017-12-24 NOTE — PROGRESS NOTE ADULT - ATTENDING COMMENTS
stable hemodyn and resp status, ventilator dependent  c diff.  still with liquid stool, though decreased frequency.

## 2017-12-24 NOTE — PROGRESS NOTE ADULT - PROBLEM SELECTOR PLAN 1
- fevers improving  - cont on voriconazole (h/o aspergillosis with sarcoidosis) through 12/28 per ID  - cont levaquin for tracheal aspirate with Acinetobacter and Klebsiella, day 6 today  - monitor fever curve  - monitor WBC  - if spikes would add back zosyn

## 2017-12-24 NOTE — PROGRESS NOTE ADULT - SUBJECTIVE AND OBJECTIVE BOX
CHIEF COMPLAINT:    Interval Events:    REVIEW OF SYSTEMS:  Constitutional:   Eyes:  ENT:  CV:  Resp:  GI:  :  MSK:  Integumentary:  Neurological:  Psychiatric:  Endocrine:  Hematologic/Lymphatic:  Allergic/Immunologic:  [ ] All other systems negative  [ ] Unable to assess ROS because ________    OBJECTIVE:  ICU Vital Signs Last 24 Hrs  T(C): 36.7 (24 Dec 2017 06:29), Max: 37.6 (23 Dec 2017 17:00)  T(F): 98.1 (24 Dec 2017 06:29), Max: 99.7 (23 Dec 2017 17:00)  HR: 123 (24 Dec 2017 07:33) (100 - 123)  BP: 126/92 (24 Dec 2017 06:29) (112/76 - 146/85)  BP(mean): --  ABP: --  ABP(mean): --  RR: 14 (24 Dec 2017 06:29) (14 - 16)  SpO2: 100% (24 Dec 2017 07:33) (100% - 100%)    Mode: AC/ CMV (Assist Control/ Continuous Mandatory Ventilation), RR (machine): 12, TV (machine): 450, FiO2: 40, PEEP: 5, MAP: 14, PIP: 36    12-23 @ 07:01  -  12-24 @ 07:00  --------------------------------------------------------  IN: 685 mL / OUT: 300 mL / NET: 385 mL    POCT Blood Glucose.: 111 mg/dL (24 Dec 2017 05:31)      HOSPITAL MEDICATIONS:  MEDICATIONS  (STANDING):  ALBUTerol    90 MICROgram(s) HFA Inhaler 4 Puff(s) Inhalation every 6 hours  buDESOnide 160 MICROgram(s)/formoterol 4.5 MICROgram(s) Inhaler 2 Puff(s) Inhalation two times a day  chlorhexidine 4% Liquid 1 Application(s) Topical daily  enoxaparin Injectable 40 milliGRAM(s) SubCutaneous daily  ferrous    sulfate Liquid 300 milliGRAM(s) Enteral Tube daily  folic acid 1 milliGRAM(s) Oral daily  furosemide   Injectable 40 milliGRAM(s) IV Push daily  insulin lispro (HumaLOG) corrective regimen sliding scale   SubCutaneous every 6 hours  ipratropium 17 MICROgram(s) HFA Inhaler 1 Puff(s) Inhalation every 6 hours  levoFLOXacin IVPB 750 milliGRAM(s) IV Intermittent every 24 hours  melatonin 3 milliGRAM(s) Oral at bedtime  pantoprazole  Injectable 40 milliGRAM(s) IV Push daily  valproic  acid Syrup 500 milliGRAM(s) Oral every 12 hours  vancomycin    Solution 500 milliGRAM(s) Oral every 6 hours  voriconazole IVPB 300 milliGRAM(s) IV Intermittent every 12 hours    MEDICATIONS  (PRN):  acetaminophen    Suspension. 650 milliGRAM(s) Enteral Tube every 6 hours PRN Mild Pain to moderate (1 - 6)  acetaminophen   Tablet 650 milliGRAM(s) Oral every 6 hours PRN For Temp greater than 38 C (100.4 F)  LORazepam   Injectable 0.5 milliGRAM(s) IV Push every 8 hours PRN Anxiety      LABS:                        7.3    11.01 )-----------( 264      ( 24 Dec 2017 06:16 )             24.7     12-24    143  |  92<L>  |  8   ----------------------------<  121<H>  3.9   |  x   |  0.60    Ca    8.9      24 Dec 2017 06:16  Phos  3.8     12-24  Mg     2.0     12-24                MICROBIOLOGY:     RADIOLOGY:  [ ] Reviewed and interpreted by me    PULMONARY FUNCTION TESTS:    EKG: CHIEF COMPLAINT: Patient is a 47y old  Male who presents with a chief complaint of Patient is a 47y old  Male who presents with a chief complaint of hemoptysis (09 Nov 2017 00:32) (09 Dec 2017 16:29)    Interval Events: none overnight      REVIEW OF SYSTEMS:  Constitutional: no complaints  CV: denies  Resp: denies  GI: denies  [x] All other systems negative  [ ] Unable to assess ROS because ________      OBJECTIVE:  ICU Vital Signs Last 24 Hrs  T(C): 36.7 (24 Dec 2017 06:29), Max: 37.6 (23 Dec 2017 17:00)  T(F): 98.1 (24 Dec 2017 06:29), Max: 99.7 (23 Dec 2017 17:00)  HR: 123 (24 Dec 2017 07:33) (100 - 123)  BP: 126/92 (24 Dec 2017 06:29) (112/76 - 146/85)  BP(mean): --  ABP: --  ABP(mean): --  RR: 14 (24 Dec 2017 06:29) (14 - 16)  SpO2: 100% (24 Dec 2017 07:33) (100% - 100%)    Mode: AC/ CMV (Assist Control/ Continuous Mandatory Ventilation), RR (machine): 12, TV (machine): 450, FiO2: 40, PEEP: 5, MAP: 14, PIP: 36    12-23 @ 07:01  -  12-24 @ 07:00  --------------------------------------------------------  IN: 685 mL / OUT: 300 mL / NET: 385 mL    POCT Blood Glucose.: 111 mg/dL (24 Dec 2017 05:31)      HOSPITAL MEDICATIONS:  MEDICATIONS  (STANDING):  ALBUTerol    90 MICROgram(s) HFA Inhaler 4 Puff(s) Inhalation every 6 hours  buDESOnide 160 MICROgram(s)/formoterol 4.5 MICROgram(s) Inhaler 2 Puff(s) Inhalation two times a day  chlorhexidine 4% Liquid 1 Application(s) Topical daily  enoxaparin Injectable 40 milliGRAM(s) SubCutaneous daily  ferrous    sulfate Liquid 300 milliGRAM(s) Enteral Tube daily  folic acid 1 milliGRAM(s) Oral daily  furosemide   Injectable 40 milliGRAM(s) IV Push daily  insulin lispro (HumaLOG) corrective regimen sliding scale   SubCutaneous every 6 hours  ipratropium 17 MICROgram(s) HFA Inhaler 1 Puff(s) Inhalation every 6 hours  levoFLOXacin IVPB 750 milliGRAM(s) IV Intermittent every 24 hours  melatonin 3 milliGRAM(s) Oral at bedtime  pantoprazole  Injectable 40 milliGRAM(s) IV Push daily  valproic  acid Syrup 500 milliGRAM(s) Oral every 12 hours  vancomycin    Solution 500 milliGRAM(s) Oral every 6 hours  voriconazole IVPB 300 milliGRAM(s) IV Intermittent every 12 hours    MEDICATIONS  (PRN):  acetaminophen    Suspension. 650 milliGRAM(s) Enteral Tube every 6 hours PRN Mild Pain to moderate (1 - 6)  acetaminophen   Tablet 650 milliGRAM(s) Oral every 6 hours PRN For Temp greater than 38 C (100.4 F)  LORazepam   Injectable 0.5 milliGRAM(s) IV Push every 8 hours PRN Anxiety      LABS:                        7.3    11.01 )-----------( 264      ( 24 Dec 2017 06:16 )             24.7     12-24    143  |  92<L>  |  8   ----------------------------<  121<H>  3.9   |  x   |  0.60    Ca    8.9      24 Dec 2017 06:16  Phos  3.8     12-24  Mg     2.0     12-24

## 2017-12-25 LAB
GLUCOSE BLDC GLUCOMTR-MCNC: 112 MG/DL — HIGH (ref 70–99)
GLUCOSE BLDC GLUCOMTR-MCNC: 115 MG/DL — HIGH (ref 70–99)
GLUCOSE BLDC GLUCOMTR-MCNC: 115 MG/DL — HIGH (ref 70–99)
GLUCOSE BLDC GLUCOMTR-MCNC: 95 MG/DL — SIGNIFICANT CHANGE UP (ref 70–99)

## 2017-12-25 PROCEDURE — 99233 SBSQ HOSP IP/OBS HIGH 50: CPT | Mod: GC

## 2017-12-25 PROCEDURE — 71250 CT THORAX DX C-: CPT | Mod: 26

## 2017-12-25 PROCEDURE — 71010: CPT | Mod: 26

## 2017-12-25 RX ORDER — LACTOBACILLUS ACIDOPHILUS 100MM CELL
1 CAPSULE ORAL
Qty: 0 | Refills: 0 | Status: DISCONTINUED | OUTPATIENT
Start: 2017-12-25 | End: 2018-01-10

## 2017-12-25 RX ORDER — FUROSEMIDE 40 MG
40 TABLET ORAL DAILY
Qty: 0 | Refills: 0 | Status: DISCONTINUED | OUTPATIENT
Start: 2017-12-25 | End: 2017-12-30

## 2017-12-25 RX ORDER — FUROSEMIDE 40 MG
40 TABLET ORAL DAILY
Qty: 0 | Refills: 0 | Status: DISCONTINUED | OUTPATIENT
Start: 2017-12-25 | End: 2017-12-25

## 2017-12-25 RX ADMIN — ALBUTEROL 4 PUFF(S): 90 AEROSOL, METERED ORAL at 03:57

## 2017-12-25 RX ADMIN — Medication 500 MILLIGRAM(S): at 17:10

## 2017-12-25 RX ADMIN — Medication 300 MILLIGRAM(S): at 11:37

## 2017-12-25 RX ADMIN — VORICONAZOLE 125 MILLIGRAM(S): 10 INJECTION, POWDER, LYOPHILIZED, FOR SOLUTION INTRAVENOUS at 21:53

## 2017-12-25 RX ADMIN — Medication 1 PUFF(S): at 15:31

## 2017-12-25 RX ADMIN — Medication 500 MILLIGRAM(S): at 00:46

## 2017-12-25 RX ADMIN — Medication 1 PUFF(S): at 22:39

## 2017-12-25 RX ADMIN — Medication 1 PUFF(S): at 10:00

## 2017-12-25 RX ADMIN — Medication 40 MILLIGRAM(S): at 05:44

## 2017-12-25 RX ADMIN — Medication 1 PUFF(S): at 03:57

## 2017-12-25 RX ADMIN — ALBUTEROL 4 PUFF(S): 90 AEROSOL, METERED ORAL at 15:32

## 2017-12-25 RX ADMIN — Medication 500 MILLIGRAM(S): at 11:37

## 2017-12-25 RX ADMIN — Medication 500 MILLIGRAM(S): at 05:46

## 2017-12-25 RX ADMIN — ALBUTEROL 4 PUFF(S): 90 AEROSOL, METERED ORAL at 22:39

## 2017-12-25 RX ADMIN — Medication 1 TABLET(S): at 21:58

## 2017-12-25 RX ADMIN — Medication 500 MILLIGRAM(S): at 05:44

## 2017-12-25 RX ADMIN — VORICONAZOLE 125 MILLIGRAM(S): 10 INJECTION, POWDER, LYOPHILIZED, FOR SOLUTION INTRAVENOUS at 11:30

## 2017-12-25 RX ADMIN — Medication 1 TABLET(S): at 17:06

## 2017-12-25 RX ADMIN — PANTOPRAZOLE SODIUM 40 MILLIGRAM(S): 20 TABLET, DELAYED RELEASE ORAL at 11:37

## 2017-12-25 RX ADMIN — Medication 3 MILLIGRAM(S): at 21:53

## 2017-12-25 RX ADMIN — ENOXAPARIN SODIUM 40 MILLIGRAM(S): 100 INJECTION SUBCUTANEOUS at 11:30

## 2017-12-25 RX ADMIN — Medication 500 MILLIGRAM(S): at 17:06

## 2017-12-25 RX ADMIN — Medication 1 MILLIGRAM(S): at 11:38

## 2017-12-25 RX ADMIN — CHLORHEXIDINE GLUCONATE 1 APPLICATION(S): 213 SOLUTION TOPICAL at 11:37

## 2017-12-25 RX ADMIN — ALBUTEROL 4 PUFF(S): 90 AEROSOL, METERED ORAL at 10:00

## 2017-12-25 NOTE — PROVIDER CONTACT NOTE (OTHER) - ASSESSMENT
Pt c/o of pain at old lobectomy site when palpated; site is edematous. Surgical site is healed-well-approximated scar. VSS

## 2017-12-25 NOTE — PROGRESS NOTE ADULT - SUBJECTIVE AND OBJECTIVE BOX
CHIEF COMPLAINT:    Interval Events:    REVIEW OF SYSTEMS:  Constitutional:   Eyes:  ENT:  CV:  Resp:  GI:  :  MSK:  Integumentary:  Neurological:  Psychiatric:  Endocrine:  Hematologic/Lymphatic:  Allergic/Immunologic:  [ ] All other systems negative  [ ] Unable to assess ROS because ________    OBJECTIVE:  ICU Vital Signs Last 24 Hrs  T(C): 37.3 (25 Dec 2017 05:41), Max: 37.3 (24 Dec 2017 17:41)  T(F): 99.2 (25 Dec 2017 05:41), Max: 99.2 (25 Dec 2017 05:41)  HR: 119 (25 Dec 2017 07:15) (108 - 119)  BP: 110/61 (25 Dec 2017 05:41) (100/58 - 127/99)  BP(mean): 82 (24 Dec 2017 17:41) (82 - 97)  ABP: --  ABP(mean): --  RR: 19 (25 Dec 2017 05:41) (14 - 19)  SpO2: 99% (25 Dec 2017 07:15) (94% - 100%)    Mode: AC/ CMV (Assist Control/ Continuous Mandatory Ventilation), RR (machine): 12, TV (machine): 450, FiO2: 40, PEEP: 5, MAP: 10.4, PIP: 33    12-24 @ 07:01  -  12-25 @ 07:00  --------------------------------------------------------  IN: 2065 mL / OUT: 1500 mL / NET: 565 mL      CAPILLARY BLOOD GLUCOSE      POCT Blood Glucose.: 115 mg/dL (25 Dec 2017 05:32)      PHYSICAL EXAM:  General:   HEENT:   Lymph Nodes:  Neck:   Respiratory:   Cardiovascular:   Abdomen:   Extremities:   Skin:   Neurological:  Psychiatry:    HOSPITAL MEDICATIONS:  MEDICATIONS  (STANDING):  ALBUTerol    90 MICROgram(s) HFA Inhaler 4 Puff(s) Inhalation every 6 hours  buDESOnide 160 MICROgram(s)/formoterol 4.5 MICROgram(s) Inhaler 2 Puff(s) Inhalation two times a day  chlorhexidine 4% Liquid 1 Application(s) Topical daily  enoxaparin Injectable 40 milliGRAM(s) SubCutaneous daily  fentaNYL   Patch  50 MICROgram(s)/Hr 1 Patch Transdermal every 72 hours  ferrous    sulfate Liquid 300 milliGRAM(s) Enteral Tube daily  folic acid 1 milliGRAM(s) Oral daily  furosemide   Injectable 40 milliGRAM(s) IV Push daily  insulin lispro (HumaLOG) corrective regimen sliding scale   SubCutaneous every 6 hours  ipratropium 17 MICROgram(s) HFA Inhaler 1 Puff(s) Inhalation every 6 hours  levoFLOXacin IVPB 750 milliGRAM(s) IV Intermittent every 24 hours  melatonin 3 milliGRAM(s) Oral at bedtime  pantoprazole  Injectable 40 milliGRAM(s) IV Push daily  valproic  acid Syrup 500 milliGRAM(s) Oral every 12 hours  vancomycin    Solution 500 milliGRAM(s) Oral every 6 hours  voriconazole IVPB 300 milliGRAM(s) IV Intermittent every 12 hours    MEDICATIONS  (PRN):  acetaminophen    Suspension. 650 milliGRAM(s) Enteral Tube every 6 hours PRN Mild Pain to moderate (1 - 6)  acetaminophen   Tablet 650 milliGRAM(s) Oral every 6 hours PRN For Temp greater than 38 C (100.4 F)  LORazepam   Injectable 0.5 milliGRAM(s) IV Push every 8 hours PRN Anxiety      LABS:                        7.3    11.01 )-----------( 264      ( 24 Dec 2017 06:16 )             24.7     12-24    143  |  92<L>  |  8   ----------------------------<  121<H>  3.9   |  45<HH>  |  0.60    Ca    8.9      24 Dec 2017 06:16  Phos  3.8     12-24  Mg     2.0     12-24          Arterial Blood Gas:  12-24 @ 12:40  7.45/69/164/45/99.6/21.6  ABG lactate: --        MICROBIOLOGY:     RADIOLOGY:  [ ] Reviewed and interpreted by me    PULMONARY FUNCTION TESTS:    EKG: CHIEF COMPLAINT: Patient is a 47y old  Male who presents with a chief complaint of Patient is a 47y old  Male who presents with a chief complaint of hemoptysis (09 Nov 2017 00:32) (09 Dec 2017 16:29)    Interval Events: none overnight      REVIEW OF SYSTEMS:  CV: denies  Resp: denies  GI: denies  [x] All other systems negative  [ ] Unable to assess ROS because ________      OBJECTIVE:  ICU Vital Signs Last 24 Hrs  T(C): 37.3 (25 Dec 2017 05:41), Max: 37.3 (24 Dec 2017 17:41)  T(F): 99.2 (25 Dec 2017 05:41), Max: 99.2 (25 Dec 2017 05:41)  HR: 119 (25 Dec 2017 07:15) (108 - 119)  BP: 110/61 (25 Dec 2017 05:41) (100/58 - 127/99)  BP(mean): 82 (24 Dec 2017 17:41) (82 - 97)  ABP: --  ABP(mean): --  RR: 19 (25 Dec 2017 05:41) (14 - 19)  SpO2: 99% (25 Dec 2017 07:15) (94% - 100%)    Mode: AC/ CMV (Assist Control/ Continuous Mandatory Ventilation), RR (machine): 12, TV (machine): 450, FiO2: 40, PEEP: 5, MAP: 10.4, PIP: 33    12-24 @ 07:01  -  12-25 @ 07:00  --------------------------------------------------------  IN: 2065 mL / OUT: 1500 mL / NET: 565 mL      POCT Blood Glucose.: 115 mg/dL (25 Dec 2017 05:32)      HOSPITAL MEDICATIONS:  MEDICATIONS  (STANDING):  ALBUTerol    90 MICROgram(s) HFA Inhaler 4 Puff(s) Inhalation every 6 hours  buDESOnide 160 MICROgram(s)/formoterol 4.5 MICROgram(s) Inhaler 2 Puff(s) Inhalation two times a day  chlorhexidine 4% Liquid 1 Application(s) Topical daily  enoxaparin Injectable 40 milliGRAM(s) SubCutaneous daily  fentaNYL   Patch  50 MICROgram(s)/Hr 1 Patch Transdermal every 72 hours  ferrous    sulfate Liquid 300 milliGRAM(s) Enteral Tube daily  folic acid 1 milliGRAM(s) Oral daily  furosemide   Injectable 40 milliGRAM(s) IV Push daily  insulin lispro (HumaLOG) corrective regimen sliding scale   SubCutaneous every 6 hours  ipratropium 17 MICROgram(s) HFA Inhaler 1 Puff(s) Inhalation every 6 hours  levoFLOXacin IVPB 750 milliGRAM(s) IV Intermittent every 24 hours  melatonin 3 milliGRAM(s) Oral at bedtime  pantoprazole  Injectable 40 milliGRAM(s) IV Push daily  valproic  acid Syrup 500 milliGRAM(s) Oral every 12 hours  vancomycin    Solution 500 milliGRAM(s) Oral every 6 hours  voriconazole IVPB 300 milliGRAM(s) IV Intermittent every 12 hours    MEDICATIONS  (PRN):  acetaminophen    Suspension. 650 milliGRAM(s) Enteral Tube every 6 hours PRN Mild Pain to moderate (1 - 6)  acetaminophen   Tablet 650 milliGRAM(s) Oral every 6 hours PRN For Temp greater than 38 C (100.4 F)  LORazepam   Injectable 0.5 milliGRAM(s) IV Push every 8 hours PRN Anxiety

## 2017-12-25 NOTE — PROVIDER CONTACT NOTE (OTHER) - ACTION/TREATMENT ORDERED:
NP Donna Mobley notified; CTICU notified to assess site; CXR ordered and CT scan to be ordered depending on results of CXR; cont to monitor.

## 2017-12-25 NOTE — PROGRESS NOTE ADULT - SUBJECTIVE AND OBJECTIVE BOX
CT scan results discussed with Dr Caldera.  As findings are not emergent or clinically significant, he will follow up this week.

## 2017-12-25 NOTE — CHART NOTE - NSCHARTNOTEFT_GEN_A_CORE
Called by RN while pt was being changed.  Left side chest wall with fluctuant mass overlying site of previous pigtail catheter scar. Erythema noted, also tender to palpation.  CTsx called to reevaluate site 2/2 to previous pneumonectomy/pleurodesis/CT.  CXR also ordered.  Will monitor.

## 2017-12-25 NOTE — PROGRESS NOTE ADULT - PROBLEM SELECTOR PLAN 5
- Vanco via PEG increase to 500mg on 12/19  - contact precautions   - stools now watery again  - will ask dietary if feeds are the possible problem  - consider GI eval if feeds are not an issue  - monitor stools

## 2017-12-25 NOTE — PROGRESS NOTE ADULT - ATTENDING COMMENTS
persistent watery diarrhea.  on high dose vanco for cdiff.  afebrile, WBC normalized. abdomen is benign  ID follow up.  last dose levaquin today.  nutrition f/u, perhaps different feeding would help persistent watery diarrhea.  on high dose vanco for cdiff.  afebrile, WBC normalized. abdomen is benign  trial of different feed. nutrition eval  ID f/u appreciated    left chest wall fluctuant, tender at site of prior tube. CTS f/u to evaluate

## 2017-12-25 NOTE — PROGRESS NOTE ADULT - PROBLEM SELECTOR PLAN 1
- fevers improving  - cont on voriconazole (h/o aspergillosis with sarcoidosis) through 12/28 per ID  - cont levaquin for tracheal aspirate with Acinetobacter and Klebsiella, last day today  - monitor fever curve  - monitor WBC  - if spikes would add back zosyn

## 2017-12-26 DIAGNOSIS — R07.9 CHEST PAIN, UNSPECIFIED: ICD-10-CM

## 2017-12-26 LAB
BUN SERPL-MCNC: 8 MG/DL — SIGNIFICANT CHANGE UP (ref 7–23)
CALCIUM SERPL-MCNC: 8.9 MG/DL — SIGNIFICANT CHANGE UP (ref 8.4–10.5)
CHLORIDE SERPL-SCNC: 89 MMOL/L — LOW (ref 98–107)
CO2 SERPL-SCNC: 50 MMOL/L — CRITICAL HIGH (ref 22–31)
CREAT SERPL-MCNC: 0.66 MG/DL — SIGNIFICANT CHANGE UP (ref 0.5–1.3)
GLUCOSE BLDC GLUCOMTR-MCNC: 102 MG/DL — HIGH (ref 70–99)
GLUCOSE BLDC GLUCOMTR-MCNC: 110 MG/DL — HIGH (ref 70–99)
GLUCOSE BLDC GLUCOMTR-MCNC: 112 MG/DL — HIGH (ref 70–99)
GLUCOSE BLDC GLUCOMTR-MCNC: 116 MG/DL — HIGH (ref 70–99)
GLUCOSE SERPL-MCNC: 108 MG/DL — HIGH (ref 70–99)
HCT VFR BLD CALC: 23.5 % — LOW (ref 39–50)
HGB BLD-MCNC: 7.2 G/DL — LOW (ref 13–17)
MCHC RBC-ENTMCNC: 28.5 PG — SIGNIFICANT CHANGE UP (ref 27–34)
MCHC RBC-ENTMCNC: 30.6 % — LOW (ref 32–36)
MCV RBC AUTO: 92.9 FL — SIGNIFICANT CHANGE UP (ref 80–100)
NRBC # FLD: 0 — SIGNIFICANT CHANGE UP
PLATELET # BLD AUTO: 222 K/UL — SIGNIFICANT CHANGE UP (ref 150–400)
PMV BLD: 12.2 FL — SIGNIFICANT CHANGE UP (ref 7–13)
POTASSIUM SERPL-MCNC: 3.6 MMOL/L — SIGNIFICANT CHANGE UP (ref 3.5–5.3)
POTASSIUM SERPL-SCNC: 3.6 MMOL/L — SIGNIFICANT CHANGE UP (ref 3.5–5.3)
RBC # BLD: 2.53 M/UL — LOW (ref 4.2–5.8)
RBC # FLD: 15.6 % — HIGH (ref 10.3–14.5)
SODIUM SERPL-SCNC: 141 MMOL/L — SIGNIFICANT CHANGE UP (ref 135–145)
WBC # BLD: 11.99 K/UL — HIGH (ref 3.8–10.5)
WBC # FLD AUTO: 11.99 K/UL — HIGH (ref 3.8–10.5)

## 2017-12-26 PROCEDURE — 99233 SBSQ HOSP IP/OBS HIGH 50: CPT | Mod: GC

## 2017-12-26 RX ORDER — OXYCODONE HYDROCHLORIDE 5 MG/1
5 TABLET ORAL ONCE
Qty: 0 | Refills: 0 | Status: DISCONTINUED | OUTPATIENT
Start: 2017-12-26 | End: 2017-12-26

## 2017-12-26 RX ADMIN — Medication 40 MILLIGRAM(S): at 05:02

## 2017-12-26 RX ADMIN — Medication 500 MILLIGRAM(S): at 05:02

## 2017-12-26 RX ADMIN — Medication 3 MILLIGRAM(S): at 21:25

## 2017-12-26 RX ADMIN — BUDESONIDE AND FORMOTEROL FUMARATE DIHYDRATE 2 PUFF(S): 160; 4.5 AEROSOL RESPIRATORY (INHALATION) at 21:27

## 2017-12-26 RX ADMIN — Medication 1 PUFF(S): at 21:26

## 2017-12-26 RX ADMIN — Medication 1 PUFF(S): at 10:35

## 2017-12-26 RX ADMIN — Medication 500 MILLIGRAM(S): at 13:07

## 2017-12-26 RX ADMIN — Medication 650 MILLIGRAM(S): at 15:06

## 2017-12-26 RX ADMIN — Medication 500 MILLIGRAM(S): at 17:11

## 2017-12-26 RX ADMIN — Medication 1 TABLET(S): at 09:45

## 2017-12-26 RX ADMIN — Medication 1 MILLIGRAM(S): at 13:07

## 2017-12-26 RX ADMIN — Medication 1 PUFF(S): at 15:16

## 2017-12-26 RX ADMIN — ALBUTEROL 4 PUFF(S): 90 AEROSOL, METERED ORAL at 10:35

## 2017-12-26 RX ADMIN — ALBUTEROL 4 PUFF(S): 90 AEROSOL, METERED ORAL at 21:26

## 2017-12-26 RX ADMIN — Medication 650 MILLIGRAM(S): at 16:00

## 2017-12-26 RX ADMIN — Medication 650 MILLIGRAM(S): at 04:56

## 2017-12-26 RX ADMIN — OXYCODONE HYDROCHLORIDE 5 MILLIGRAM(S): 5 TABLET ORAL at 00:58

## 2017-12-26 RX ADMIN — Medication 1 TABLET(S): at 13:07

## 2017-12-26 RX ADMIN — PANTOPRAZOLE SODIUM 40 MILLIGRAM(S): 20 TABLET, DELAYED RELEASE ORAL at 13:08

## 2017-12-26 RX ADMIN — ENOXAPARIN SODIUM 40 MILLIGRAM(S): 100 INJECTION SUBCUTANEOUS at 13:07

## 2017-12-26 RX ADMIN — Medication 1 TABLET(S): at 21:25

## 2017-12-26 RX ADMIN — Medication 650 MILLIGRAM(S): at 05:24

## 2017-12-26 RX ADMIN — Medication 1 PUFF(S): at 04:35

## 2017-12-26 RX ADMIN — ALBUTEROL 4 PUFF(S): 90 AEROSOL, METERED ORAL at 04:35

## 2017-12-26 RX ADMIN — Medication 300 MILLIGRAM(S): at 13:07

## 2017-12-26 RX ADMIN — Medication 500 MILLIGRAM(S): at 00:04

## 2017-12-26 RX ADMIN — ALBUTEROL 4 PUFF(S): 90 AEROSOL, METERED ORAL at 15:16

## 2017-12-26 RX ADMIN — Medication 1 TABLET(S): at 17:11

## 2017-12-26 RX ADMIN — CHLORHEXIDINE GLUCONATE 1 APPLICATION(S): 213 SOLUTION TOPICAL at 13:08

## 2017-12-26 RX ADMIN — Medication 500 MILLIGRAM(S): at 17:30

## 2017-12-26 RX ADMIN — VORICONAZOLE 125 MILLIGRAM(S): 10 INJECTION, POWDER, LYOPHILIZED, FOR SOLUTION INTRAVENOUS at 17:12

## 2017-12-26 RX ADMIN — OXYCODONE HYDROCHLORIDE 5 MILLIGRAM(S): 5 TABLET ORAL at 00:28

## 2017-12-26 NOTE — PROGRESS NOTE ADULT - SUBJECTIVE AND OBJECTIVE BOX
Patient seen and examined with Dr. Arias  Left chest tube site with dressing draining serous drainage, no air noted.  Patient on vent with good tidal volume, not in any distress: Vital Signs Last 24 Hrs  T(C): 37.2 (26 Dec 2017 21:33), Max: 37.4 (25 Dec 2017 21:51)  T(F): 98.9 (26 Dec 2017 21:33), Max: 99.4 (25 Dec 2017 21:51)  HR: 100 (26 Dec 2017 21:33) (99 - 120)  BP: 111/73 (26 Dec 2017 21:33) (102/67 - 117/81)  BP(mean): --  RR: 18 (26 Dec 2017 21:33) (15 - 19)  SpO2: 100% (26 Dec 2017 21:33) (98% - 100%)  Will keep dry sterile dressing on site, change dressing twice a day or as needed.  Dr. Caldera to evaluate patient later in week.    Discussed with patient, family and RN.

## 2017-12-26 NOTE — PROGRESS NOTE ADULT - PROBLEM SELECTOR PLAN 4
- pt with likely barotrauma and air trapping   - CTsx note appreciated  - CT chest pending final read  - will monitor site

## 2017-12-26 NOTE — PROGRESS NOTE ADULT - SUBJECTIVE AND OBJECTIVE BOX
CHIEF COMPLAINT:    Interval Events:      REVIEW OF SYSTEMS:  Constitutional:   Eyes:  ENT:  CV:  Resp:  GI:  :  MSK:  Integumentary:  Neurological:  Psychiatric:  Endocrine:  Hematologic/Lymphatic:  Allergic/Immunologic:  [ ] All other systems negative  [ ] Unable to assess ROS because ________      OBJECTIVE:  ICU Vital Signs Last 24 Hrs  T(C): 37.4 (26 Dec 2017 05:00), Max: 37.7 (25 Dec 2017 17:04)  T(F): 99.3 (26 Dec 2017 05:00), Max: 99.9 (25 Dec 2017 17:04)  HR: 113 (26 Dec 2017 10:35) (111 - 120)  BP: 115/80 (26 Dec 2017 05:00) (102/67 - 115/80)  BP(mean): --  ABP: --  ABP(mean): --  RR: 19 (26 Dec 2017 05:00) (12 - 19)  SpO2: 98% (26 Dec 2017 10:35) (98% - 100%)    Mode: AC/ CMV (Assist Control/ Continuous Mandatory Ventilation), RR (machine): 12, TV (machine): 450, FiO2: 40, PEEP: 5, MAP: 10.9, PIP: 37    12-25 @ 07:01  -  12-26 @ 07:00  --------------------------------------------------------  IN: 1350 mL / OUT: 300 mL / NET: 1050 mL      POCT Blood Glucose.: 112 mg/dL (26 Dec 2017 04:51)      HOSPITAL MEDICATIONS:  MEDICATIONS  (STANDING):  ALBUTerol    90 MICROgram(s) HFA Inhaler 4 Puff(s) Inhalation every 6 hours  buDESOnide 160 MICROgram(s)/formoterol 4.5 MICROgram(s) Inhaler 2 Puff(s) Inhalation two times a day  chlorhexidine 4% Liquid 1 Application(s) Topical daily  enoxaparin Injectable 40 milliGRAM(s) SubCutaneous daily  fentaNYL   Patch  50 MICROgram(s)/Hr 1 Patch Transdermal every 72 hours  ferrous    sulfate Liquid 300 milliGRAM(s) Enteral Tube daily  folic acid 1 milliGRAM(s) Oral daily  furosemide    Tablet 40 milliGRAM(s) Oral daily  insulin lispro (HumaLOG) corrective regimen sliding scale   SubCutaneous every 6 hours  ipratropium 17 MICROgram(s) HFA Inhaler 1 Puff(s) Inhalation every 6 hours  lactobacillus acidophilus 1 Tablet(s) Oral four times a day with meals  melatonin 3 milliGRAM(s) Oral at bedtime  pantoprazole  Injectable 40 milliGRAM(s) IV Push daily  valproic  acid Syrup 500 milliGRAM(s) Oral every 12 hours  vancomycin    Solution 500 milliGRAM(s) Oral every 6 hours  voriconazole IVPB 300 milliGRAM(s) IV Intermittent every 12 hours    MEDICATIONS  (PRN):  acetaminophen    Suspension. 650 milliGRAM(s) Enteral Tube every 6 hours PRN Mild Pain to moderate (1 - 6)  acetaminophen   Tablet 650 milliGRAM(s) Oral every 6 hours PRN For Temp greater than 38 C (100.4 F)  LORazepam   Injectable 0.5 milliGRAM(s) IV Push every 8 hours PRN Anxiety      LABS:                        7.2    11.99 )-----------( 222      ( 26 Dec 2017 07:30 )             23.5     12-26    141  |  89<L>  |  8   ----------------------------<  108<H>  3.6   |  50<HH>  |  0.66    Ca    8.9      26 Dec 2017 07:30          Arterial Blood Gas:  12-24 @ 12:40  7.45/69/164/45/99.6/21.6  ABG lactate: --        MICROBIOLOGY:     RADIOLOGY:  [ ] Reviewed and interpreted by me    PULMONARY FUNCTION TESTS:    EKG: CHIEF COMPLAINT: Patient is a 47y old  Male who presents with a chief complaint of Patient is a 47y old  Male who presents with a chief complaint of hemoptysis (09 Nov 2017 00:32) (09 Dec 2017 16:29)    Interval Events: none overnight      REVIEW OF SYSTEMS:  Constitutional: no complaints  CV: c/o pain to left upper chest where pigtail cath was  Resp: denies  GI: denies  [x] All other systems negative  [ ] Unable to assess ROS because ________      OBJECTIVE:  ICU Vital Signs Last 24 Hrs  T(C): 37.4 (26 Dec 2017 05:00), Max: 37.7 (25 Dec 2017 17:04)  T(F): 99.3 (26 Dec 2017 05:00), Max: 99.9 (25 Dec 2017 17:04)  HR: 113 (26 Dec 2017 10:35) (111 - 120)  BP: 115/80 (26 Dec 2017 05:00) (102/67 - 115/80)  BP(mean): --  ABP: --  ABP(mean): --  RR: 19 (26 Dec 2017 05:00) (12 - 19)  SpO2: 98% (26 Dec 2017 10:35) (98% - 100%)    Mode: AC/ CMV (Assist Control/ Continuous Mandatory Ventilation), RR (machine): 12, TV (machine): 450, FiO2: 40, PEEP: 5, MAP: 10.9, PIP: 37    12-25 @ 07:01  -  12-26 @ 07:00  --------------------------------------------------------  IN: 1350 mL / OUT: 300 mL / NET: 1050 mL      POCT Blood Glucose.: 112 mg/dL (26 Dec 2017 04:51)      HOSPITAL MEDICATIONS:  MEDICATIONS  (STANDING):  ALBUTerol    90 MICROgram(s) HFA Inhaler 4 Puff(s) Inhalation every 6 hours  buDESOnide 160 MICROgram(s)/formoterol 4.5 MICROgram(s) Inhaler 2 Puff(s) Inhalation two times a day  chlorhexidine 4% Liquid 1 Application(s) Topical daily  enoxaparin Injectable 40 milliGRAM(s) SubCutaneous daily  fentaNYL   Patch  50 MICROgram(s)/Hr 1 Patch Transdermal every 72 hours  ferrous    sulfate Liquid 300 milliGRAM(s) Enteral Tube daily  folic acid 1 milliGRAM(s) Oral daily  furosemide    Tablet 40 milliGRAM(s) Oral daily  insulin lispro (HumaLOG) corrective regimen sliding scale   SubCutaneous every 6 hours  ipratropium 17 MICROgram(s) HFA Inhaler 1 Puff(s) Inhalation every 6 hours  lactobacillus acidophilus 1 Tablet(s) Oral four times a day with meals  melatonin 3 milliGRAM(s) Oral at bedtime  pantoprazole  Injectable 40 milliGRAM(s) IV Push daily  valproic  acid Syrup 500 milliGRAM(s) Oral every 12 hours  vancomycin    Solution 500 milliGRAM(s) Oral every 6 hours  voriconazole IVPB 300 milliGRAM(s) IV Intermittent every 12 hours    MEDICATIONS  (PRN):  acetaminophen    Suspension. 650 milliGRAM(s) Enteral Tube every 6 hours PRN Mild Pain to moderate (1 - 6)  acetaminophen   Tablet 650 milliGRAM(s) Oral every 6 hours PRN For Temp greater than 38 C (100.4 F)  LORazepam   Injectable 0.5 milliGRAM(s) IV Push every 8 hours PRN Anxiety      LABS:                        7.2    11.99 )-----------( 222      ( 26 Dec 2017 07:30 )             23.5     12-26    141  |  89<L>  |  8   ----------------------------<  108<H>  3.6   |  50<HH>  |  0.66    Ca    8.9      26 Dec 2017 07:30

## 2017-12-26 NOTE — CHART NOTE - NSCHARTNOTEFT_GEN_A_CORE
NUTRITION FOLLOW-UP:    Pt referred for nutrition consult 2/2/ diarrhea, and for evaluation of TF formula.  Pt w/C.Diff, on antibiotics.  Pt started now on lactobacillus acidophilus and TF formula changed for the 3rd time in effort to reduce diarrhea.  Noted pt w/Stage II skin breakdown on sacrum and around the tracheal area.  Wt increase noted, 13.3kg since admission - likely fluid related.  pt w/1+ edema L hand and 2+ R hand.      Weight:  12.26 - 88.3kg     12/7 - 99.7kg    Admit Wt: 75kg  Labs:  H/H 7.2/23.5  Glu 108  POCT     Current Diet:  Peptamen 1.5 @40ml/h x 24h + 1 pack prosource/d  Enteral Recommendations:  TF providing 1440 kcal w/68gm protein + 15 grams via prosource  = 83gms/d.  Estimated kcal needs = 20-25kcal/kg = 9363-1634 kcal /d.  Protein needs = 90gms/d  (1.2gm/kg).  Suggest, if improvement in diarrhea output, to increase TF to goal of 45ml/h to provide 1620 kcal w/76.5gm protein and continue prosource to increase protein intake to 91.5gms/d to meet current needs.     RD to Remain Available:  yes    Additional Recommendations:   1) Monitor weights, labs, skin integrity, tolerance to TF, stool output  2) Consider MVI supplement to aid in healing of pressure ulcers  3) Advance TF as tolerated to goal of 45ml/h to meet current needs  4) Continue Prosource (protein supplement) as ordered.

## 2017-12-26 NOTE — CHART NOTE - NSCHARTNOTEFT_GEN_A_CORE
Called by RN who states that pt's pigtail site is now open.   Pt seen and examined bedside, with significant other and RN also present.  Left side chest at site of previous pigtail catheter is now an open wound, with some drainage.   Pt has no complaints. Vent and respiratory status stable, oxygenation is good, pulling full TV. Hemodynamically stable. States he actually feels better since the pressure is now gone.  CTsx called, YUDITH Adkins at bedside ([id number]). He states he will speak with attending and follow up.  RN aware to keep site covered with 3 way dressing so that air may still escape.  Night shift aware to monitor patient.

## 2017-12-26 NOTE — PROGRESS NOTE ADULT - PROBLEM SELECTOR PLAN 6
- Vanco via PEG increase to 500mg on 12/19  - contact precautions   - stools improving  - changed feeds and added lactobacillus  - consider GI eval if feeds are not an issue  - monitor stools

## 2017-12-26 NOTE — PROGRESS NOTE ADULT - PROBLEM SELECTOR PLAN 1
- fevers resolved  - cont on voriconazole (h/o aspergillosis with sarcoidosis) through 12/28 per ID  - s/p completion of levaquin

## 2017-12-26 NOTE — PROGRESS NOTE ADULT - ATTENDING COMMENTS
Ct results reviewed.  will further d/w thoracic surgery.    hemodynam stable, vent settings unchanged  watery stool improved with change of feeds    d/c planning to vent facility unless something planned for thoracic

## 2017-12-27 LAB
ACID FAST STN SPEC: SIGNIFICANT CHANGE UP
BUN SERPL-MCNC: 7 MG/DL — SIGNIFICANT CHANGE UP (ref 7–23)
CALCIUM SERPL-MCNC: 8.8 MG/DL — SIGNIFICANT CHANGE UP (ref 8.4–10.5)
CHLORIDE SERPL-SCNC: 87 MMOL/L — LOW (ref 98–107)
CO2 SERPL-SCNC: 43 MMOL/L — HIGH (ref 22–31)
CREAT SERPL-MCNC: 0.62 MG/DL — SIGNIFICANT CHANGE UP (ref 0.5–1.3)
GLUCOSE BLDC GLUCOMTR-MCNC: 115 MG/DL — HIGH (ref 70–99)
GLUCOSE BLDC GLUCOMTR-MCNC: 117 MG/DL — HIGH (ref 70–99)
GLUCOSE BLDC GLUCOMTR-MCNC: 120 MG/DL — HIGH (ref 70–99)
GLUCOSE BLDC GLUCOMTR-MCNC: 146 MG/DL — HIGH (ref 70–99)
GLUCOSE SERPL-MCNC: 106 MG/DL — HIGH (ref 70–99)
HCT VFR BLD CALC: 23.5 % — LOW (ref 39–50)
HGB BLD-MCNC: 7.5 G/DL — LOW (ref 13–17)
MCHC RBC-ENTMCNC: 29.9 PG — SIGNIFICANT CHANGE UP (ref 27–34)
MCHC RBC-ENTMCNC: 31.9 % — LOW (ref 32–36)
MCV RBC AUTO: 93.6 FL — SIGNIFICANT CHANGE UP (ref 80–100)
NRBC # FLD: 0 — SIGNIFICANT CHANGE UP
PLATELET # BLD AUTO: 238 K/UL — SIGNIFICANT CHANGE UP (ref 150–400)
PMV BLD: 13 FL — SIGNIFICANT CHANGE UP (ref 7–13)
POTASSIUM SERPL-MCNC: 3.6 MMOL/L — SIGNIFICANT CHANGE UP (ref 3.5–5.3)
POTASSIUM SERPL-SCNC: 3.6 MMOL/L — SIGNIFICANT CHANGE UP (ref 3.5–5.3)
RBC # BLD: 2.51 M/UL — LOW (ref 4.2–5.8)
RBC # FLD: 15.6 % — HIGH (ref 10.3–14.5)
SODIUM SERPL-SCNC: 138 MMOL/L — SIGNIFICANT CHANGE UP (ref 135–145)
WBC # BLD: 11.28 K/UL — HIGH (ref 3.8–10.5)
WBC # FLD AUTO: 11.28 K/UL — HIGH (ref 3.8–10.5)

## 2017-12-27 PROCEDURE — 99233 SBSQ HOSP IP/OBS HIGH 50: CPT | Mod: GC

## 2017-12-27 RX ADMIN — Medication 0.5 MILLIGRAM(S): at 23:21

## 2017-12-27 RX ADMIN — Medication 300 MILLIGRAM(S): at 12:27

## 2017-12-27 RX ADMIN — ALBUTEROL 4 PUFF(S): 90 AEROSOL, METERED ORAL at 10:49

## 2017-12-27 RX ADMIN — Medication 500 MILLIGRAM(S): at 17:00

## 2017-12-27 RX ADMIN — Medication 500 MILLIGRAM(S): at 17:01

## 2017-12-27 RX ADMIN — Medication 1 TABLET(S): at 23:21

## 2017-12-27 RX ADMIN — Medication 500 MILLIGRAM(S): at 05:50

## 2017-12-27 RX ADMIN — CHLORHEXIDINE GLUCONATE 1 APPLICATION(S): 213 SOLUTION TOPICAL at 12:43

## 2017-12-27 RX ADMIN — Medication 500 MILLIGRAM(S): at 01:12

## 2017-12-27 RX ADMIN — ALBUTEROL 4 PUFF(S): 90 AEROSOL, METERED ORAL at 15:57

## 2017-12-27 RX ADMIN — VORICONAZOLE 125 MILLIGRAM(S): 10 INJECTION, POWDER, LYOPHILIZED, FOR SOLUTION INTRAVENOUS at 17:00

## 2017-12-27 RX ADMIN — Medication 1 TABLET(S): at 17:00

## 2017-12-27 RX ADMIN — BUDESONIDE AND FORMOTEROL FUMARATE DIHYDRATE 2 PUFF(S): 160; 4.5 AEROSOL RESPIRATORY (INHALATION) at 10:51

## 2017-12-27 RX ADMIN — ALBUTEROL 4 PUFF(S): 90 AEROSOL, METERED ORAL at 03:58

## 2017-12-27 RX ADMIN — FENTANYL CITRATE 1 PATCH: 50 INJECTION INTRAVENOUS at 12:28

## 2017-12-27 RX ADMIN — BUDESONIDE AND FORMOTEROL FUMARATE DIHYDRATE 2 PUFF(S): 160; 4.5 AEROSOL RESPIRATORY (INHALATION) at 20:45

## 2017-12-27 RX ADMIN — Medication 500 MILLIGRAM(S): at 23:21

## 2017-12-27 RX ADMIN — Medication 1 PUFF(S): at 20:46

## 2017-12-27 RX ADMIN — VORICONAZOLE 125 MILLIGRAM(S): 10 INJECTION, POWDER, LYOPHILIZED, FOR SOLUTION INTRAVENOUS at 05:48

## 2017-12-27 RX ADMIN — Medication 1 TABLET(S): at 12:27

## 2017-12-27 RX ADMIN — ENOXAPARIN SODIUM 40 MILLIGRAM(S): 100 INJECTION SUBCUTANEOUS at 12:27

## 2017-12-27 RX ADMIN — Medication 1 MILLIGRAM(S): at 12:27

## 2017-12-27 RX ADMIN — Medication 1 PUFF(S): at 03:58

## 2017-12-27 RX ADMIN — Medication 500 MILLIGRAM(S): at 12:27

## 2017-12-27 RX ADMIN — Medication 0.5 MILLIGRAM(S): at 01:12

## 2017-12-27 RX ADMIN — ALBUTEROL 4 PUFF(S): 90 AEROSOL, METERED ORAL at 20:46

## 2017-12-27 RX ADMIN — Medication 1 PUFF(S): at 10:50

## 2017-12-27 RX ADMIN — Medication 500 MILLIGRAM(S): at 05:48

## 2017-12-27 RX ADMIN — Medication 40 MILLIGRAM(S): at 05:48

## 2017-12-27 RX ADMIN — PANTOPRAZOLE SODIUM 40 MILLIGRAM(S): 20 TABLET, DELAYED RELEASE ORAL at 12:28

## 2017-12-27 RX ADMIN — Medication 1 PUFF(S): at 15:58

## 2017-12-27 NOTE — PROGRESS NOTE ADULT - ATTENDING COMMENTS
Chest wall has now opened, air and fluid leak, BPF  d/w thoracic.  cont dressing changes  thoracic attending to see pt tomorrw  persist diarrhea. abd benign, afebrile, no leukocytosis  cdiff, on po vanco 500

## 2017-12-27 NOTE — PROGRESS NOTE ADULT - PROBLEM SELECTOR PLAN 4
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

## 2017-12-27 NOTE — PROGRESS NOTE ADULT - PROBLEM SELECTOR PLAN 2
- s/p trach  - tolerating current vent settings  - will wean as tolerated (tolerating minimal time periods of CPAP)  - monitor respiratory status  - daily trach care  - suction prn

## 2017-12-27 NOTE — PROGRESS NOTE ADULT - ASSESSMENT
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 Currently on mechanical ventilation. Required trach exchange on 12/15 for cuff leak. Being treated for c.diff as well as for Acinetobacter and Klebsiella in tracheal aspirate. 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

## 2017-12-27 NOTE — PROGRESS NOTE ADULT - SUBJECTIVE AND OBJECTIVE BOX
CHIEF COMPLAINT:    Interval Events:    REVIEW OF SYSTEMS:  Constitutional:   Eyes:  ENT:  CV:  Resp:  GI:  :  MSK:  Integumentary:  Neurological:  Psychiatric:  Endocrine:  Hematologic/Lymphatic:  Allergic/Immunologic:  [ ] All other systems negative  [ ] Unable to assess ROS because ________    OBJECTIVE:  ICU Vital Signs Last 24 Hrs  T(C): 37.1 (27 Dec 2017 05:30), Max: 37.2 (26 Dec 2017 21:33)  T(F): 98.8 (27 Dec 2017 05:30), Max: 98.9 (26 Dec 2017 21:33)  HR: 107 (27 Dec 2017 07:34) (92 - 115)  BP: 113/77 (27 Dec 2017 05:30) (108/65 - 117/81)  BP(mean): --  ABP: --  ABP(mean): --  RR: 20 (27 Dec 2017 05:30) (15 - 20)  SpO2: 100% (27 Dec 2017 07:34) (98% - 100%)    Mode: AC/ CMV (Assist Control/ Continuous Mandatory Ventilation), RR (machine): 12, TV (machine): 450, FiO2: 40, PEEP: 5, ITime: 1, MAP: 12, PIP: 29    12-26 @ 07:01  -  12-27 @ 07:00  --------------------------------------------------------  IN: 1030 mL / OUT: 925 mL / NET: 105 mL      CAPILLARY BLOOD GLUCOSE      POCT Blood Glucose.: 117 mg/dL (27 Dec 2017 05:41)      PHYSICAL EXAM:  General:   HEENT:   Lymph Nodes:  Neck:   Respiratory:   Cardiovascular:   Abdomen:   Extremities:   Skin:   Neurological:  Psychiatry:    HOSPITAL MEDICATIONS:  MEDICATIONS  (STANDING):  ALBUTerol    90 MICROgram(s) HFA Inhaler 4 Puff(s) Inhalation every 6 hours  buDESOnide 160 MICROgram(s)/formoterol 4.5 MICROgram(s) Inhaler 2 Puff(s) Inhalation two times a day  chlorhexidine 4% Liquid 1 Application(s) Topical daily  enoxaparin Injectable 40 milliGRAM(s) SubCutaneous daily  fentaNYL   Patch  50 MICROgram(s)/Hr 1 Patch Transdermal every 72 hours  ferrous    sulfate Liquid 300 milliGRAM(s) Enteral Tube daily  folic acid 1 milliGRAM(s) Oral daily  furosemide    Tablet 40 milliGRAM(s) Oral daily  insulin lispro (HumaLOG) corrective regimen sliding scale   SubCutaneous every 6 hours  ipratropium 17 MICROgram(s) HFA Inhaler 1 Puff(s) Inhalation every 6 hours  lactobacillus acidophilus 1 Tablet(s) Oral four times a day with meals  melatonin 3 milliGRAM(s) Oral at bedtime  pantoprazole  Injectable 40 milliGRAM(s) IV Push daily  valproic  acid Syrup 500 milliGRAM(s) Oral every 12 hours  vancomycin    Solution 500 milliGRAM(s) Oral every 6 hours  voriconazole IVPB 300 milliGRAM(s) IV Intermittent every 12 hours    MEDICATIONS  (PRN):  acetaminophen    Suspension. 650 milliGRAM(s) Enteral Tube every 6 hours PRN Mild Pain to moderate (1 - 6)  acetaminophen   Tablet 650 milliGRAM(s) Oral every 6 hours PRN For Temp greater than 38 C (100.4 F)  LORazepam   Injectable 0.5 milliGRAM(s) IV Push every 8 hours PRN Anxiety      LABS:                        7.5    11.28 )-----------( 238      ( 27 Dec 2017 05:55 )             23.5     12-27    138  |  87<L>  |  7   ----------------------------<  106<H>  3.6   |  43<H>  |  0.62    Ca    8.8      27 Dec 2017 05:55                MICROBIOLOGY:     RADIOLOGY:  [ ] Reviewed and interpreted by me    PULMONARY FUNCTION TESTS:    EKG: CHIEF COMPLAINT: Patient is a 47y old  Male who presents with a chief complaint of Patient is a 47y old  Male who presents with a chief complaint of hemoptysis (09 Nov 2017 00:32) (09 Dec 2017 16:29)    Interval Events: No acute change overnight    REVIEW OF SYSTEMS:  Constitutional: Frustrated  Eyes:  ENT:  CV: Denies CP  Resp: Intermittent SOB  GI:  :  MSK:  Integumentary:  Neurological:  Psychiatric:  Endocrine:  Hematologic/Lymphatic:  Allergic/Immunologic:  [x] All other systems negative  [ ] Unable to assess ROS because ________    OBJECTIVE:  ICU Vital Signs Last 24 Hrs  T(C): 37.1 (27 Dec 2017 05:30), Max: 37.2 (26 Dec 2017 21:33)  T(F): 98.8 (27 Dec 2017 05:30), Max: 98.9 (26 Dec 2017 21:33)  HR: 107 (27 Dec 2017 07:34) (92 - 115)  BP: 113/77 (27 Dec 2017 05:30) (108/65 - 117/81)  BP(mean): --  ABP: --  ABP(mean): --  RR: 20 (27 Dec 2017 05:30) (15 - 20)  SpO2: 100% (27 Dec 2017 07:34) (98% - 100%)    Mode: AC/ CMV (Assist Control/ Continuous Mandatory Ventilation), RR (machine): 12, TV (machine): 450, FiO2: 40, PEEP: 5, ITime: 1, MAP: 12, PIP: 29    12-26 @ 07:01  -  12-27 @ 07:00  --------------------------------------------------------  IN: 1030 mL / OUT: 925 mL / NET: 105 mL      CAPILLARY BLOOD GLUCOSE      POCT Blood Glucose.: 117 mg/dL (27 Dec 2017 05:41)      HOSPITAL MEDICATIONS:  MEDICATIONS  (STANDING):  ALBUTerol    90 MICROgram(s) HFA Inhaler 4 Puff(s) Inhalation every 6 hours  buDESOnide 160 MICROgram(s)/formoterol 4.5 MICROgram(s) Inhaler 2 Puff(s) Inhalation two times a day  chlorhexidine 4% Liquid 1 Application(s) Topical daily  enoxaparin Injectable 40 milliGRAM(s) SubCutaneous daily  fentaNYL   Patch  50 MICROgram(s)/Hr 1 Patch Transdermal every 72 hours  ferrous    sulfate Liquid 300 milliGRAM(s) Enteral Tube daily  folic acid 1 milliGRAM(s) Oral daily  furosemide    Tablet 40 milliGRAM(s) Oral daily  insulin lispro (HumaLOG) corrective regimen sliding scale   SubCutaneous every 6 hours  ipratropium 17 MICROgram(s) HFA Inhaler 1 Puff(s) Inhalation every 6 hours  lactobacillus acidophilus 1 Tablet(s) Oral four times a day with meals  melatonin 3 milliGRAM(s) Oral at bedtime  pantoprazole  Injectable 40 milliGRAM(s) IV Push daily  valproic  acid Syrup 500 milliGRAM(s) Oral every 12 hours  vancomycin    Solution 500 milliGRAM(s) Oral every 6 hours  voriconazole IVPB 300 milliGRAM(s) IV Intermittent every 12 hours    MEDICATIONS  (PRN):  acetaminophen    Suspension. 650 milliGRAM(s) Enteral Tube every 6 hours PRN Mild Pain to moderate (1 - 6)  acetaminophen   Tablet 650 milliGRAM(s) Oral every 6 hours PRN For Temp greater than 38 C (100.4 F)  LORazepam   Injectable 0.5 milliGRAM(s) IV Push every 8 hours PRN Anxiety      LABS:                        7.5    11.28 )-----------( 238      ( 27 Dec 2017 05:55 )             23.5     12-27    138  |  87<L>  |  7   ----------------------------<  106<H>  3.6   |  43<H>  |  0.62    Ca    8.8      27 Dec 2017 05:55                MICROBIOLOGY:     RADIOLOGY:  [ ] Reviewed and interpreted by me    PULMONARY FUNCTION TESTS:    EKG:

## 2017-12-28 DIAGNOSIS — J86.0 PYOTHORAX WITH FISTULA: ICD-10-CM

## 2017-12-28 DIAGNOSIS — K46.9 UNSPECIFIED ABDOMINAL HERNIA WITHOUT OBSTRUCTION OR GANGRENE: ICD-10-CM

## 2017-12-28 LAB
GLUCOSE BLDC GLUCOMTR-MCNC: 107 MG/DL — HIGH (ref 70–99)
GLUCOSE BLDC GLUCOMTR-MCNC: 121 MG/DL — HIGH (ref 70–99)
GLUCOSE BLDC GLUCOMTR-MCNC: 122 MG/DL — HIGH (ref 70–99)

## 2017-12-28 PROCEDURE — 99233 SBSQ HOSP IP/OBS HIGH 50: CPT | Mod: GC

## 2017-12-28 RX ORDER — FENTANYL CITRATE 50 UG/ML
1 INJECTION INTRAVENOUS
Qty: 0 | Refills: 0 | Status: DISCONTINUED | OUTPATIENT
Start: 2017-12-28 | End: 2018-01-02

## 2017-12-28 RX ADMIN — Medication 650 MILLIGRAM(S): at 11:54

## 2017-12-28 RX ADMIN — Medication 650 MILLIGRAM(S): at 12:24

## 2017-12-28 RX ADMIN — Medication 0.5 MILLIGRAM(S): at 22:57

## 2017-12-28 RX ADMIN — Medication 1 TABLET(S): at 22:58

## 2017-12-28 RX ADMIN — Medication 500 MILLIGRAM(S): at 06:23

## 2017-12-28 RX ADMIN — PANTOPRAZOLE SODIUM 40 MILLIGRAM(S): 20 TABLET, DELAYED RELEASE ORAL at 11:24

## 2017-12-28 RX ADMIN — Medication 1 PUFF(S): at 04:53

## 2017-12-28 RX ADMIN — Medication 1 TABLET(S): at 17:21

## 2017-12-28 RX ADMIN — Medication 1 PUFF(S): at 20:37

## 2017-12-28 RX ADMIN — VORICONAZOLE 125 MILLIGRAM(S): 10 INJECTION, POWDER, LYOPHILIZED, FOR SOLUTION INTRAVENOUS at 06:23

## 2017-12-28 RX ADMIN — Medication 500 MILLIGRAM(S): at 23:02

## 2017-12-28 RX ADMIN — ALBUTEROL 4 PUFF(S): 90 AEROSOL, METERED ORAL at 20:37

## 2017-12-28 RX ADMIN — Medication 1 PUFF(S): at 11:48

## 2017-12-28 RX ADMIN — BUDESONIDE AND FORMOTEROL FUMARATE DIHYDRATE 2 PUFF(S): 160; 4.5 AEROSOL RESPIRATORY (INHALATION) at 11:49

## 2017-12-28 RX ADMIN — ALBUTEROL 4 PUFF(S): 90 AEROSOL, METERED ORAL at 04:53

## 2017-12-28 RX ADMIN — Medication 1 MILLIGRAM(S): at 11:23

## 2017-12-28 RX ADMIN — Medication 300 MILLIGRAM(S): at 11:23

## 2017-12-28 RX ADMIN — ENOXAPARIN SODIUM 40 MILLIGRAM(S): 100 INJECTION SUBCUTANEOUS at 11:23

## 2017-12-28 RX ADMIN — Medication 1 TABLET(S): at 08:57

## 2017-12-28 RX ADMIN — Medication 1 PUFF(S): at 17:37

## 2017-12-28 RX ADMIN — Medication 500 MILLIGRAM(S): at 11:23

## 2017-12-28 RX ADMIN — Medication 500 MILLIGRAM(S): at 17:22

## 2017-12-28 RX ADMIN — Medication 1 TABLET(S): at 11:24

## 2017-12-28 RX ADMIN — VORICONAZOLE 125 MILLIGRAM(S): 10 INJECTION, POWDER, LYOPHILIZED, FOR SOLUTION INTRAVENOUS at 18:05

## 2017-12-28 RX ADMIN — BUDESONIDE AND FORMOTEROL FUMARATE DIHYDRATE 2 PUFF(S): 160; 4.5 AEROSOL RESPIRATORY (INHALATION) at 20:36

## 2017-12-28 RX ADMIN — ALBUTEROL 4 PUFF(S): 90 AEROSOL, METERED ORAL at 11:47

## 2017-12-28 RX ADMIN — Medication 500 MILLIGRAM(S): at 07:23

## 2017-12-28 RX ADMIN — CHLORHEXIDINE GLUCONATE 1 APPLICATION(S): 213 SOLUTION TOPICAL at 11:23

## 2017-12-28 RX ADMIN — Medication 500 MILLIGRAM(S): at 17:21

## 2017-12-28 RX ADMIN — ALBUTEROL 4 PUFF(S): 90 AEROSOL, METERED ORAL at 17:36

## 2017-12-28 NOTE — PROGRESS NOTE ADULT - SUBJECTIVE AND OBJECTIVE BOX
Pt  seen and examined.  Left chest tube site with dressing draining serous drainage, no air noted.  Protusion of wound noted  No tenderness  Patient on vent with good tidal volume, not in any distress: recent vital signs  T(C): 37.2   T(F): 98.9   HR: 100   BP: 111/73   RR: 18   SpO2: 100%

## 2017-12-28 NOTE — PROGRESS NOTE ADULT - PROBLEM SELECTOR PLAN 3
- s/p trach  - cont nebs and medications - s/p trach  - tolerating current vent settings  - will wean as tolerated (tolerating minimal time periods of CPAP)  - monitor respiratory status  - daily trach care  - suction prn

## 2017-12-28 NOTE — PROGRESS NOTE ADULT - SUBJECTIVE AND OBJECTIVE BOX
CHIEF COMPLAINT:    Interval Events:    REVIEW OF SYSTEMS:  Constitutional:   Eyes:  ENT:  CV:  Resp:  GI:  :  MSK:  Integumentary:  Neurological:  Psychiatric:  Endocrine:  Hematologic/Lymphatic:  Allergic/Immunologic:  [ ] All other systems negative  [ ] Unable to assess ROS because ________    OBJECTIVE:  ICU Vital Signs Last 24 Hrs  T(C): 37.4 (28 Dec 2017 06:19), Max: 37.6 (28 Dec 2017 02:00)  T(F): 99.3 (28 Dec 2017 06:19), Max: 99.6 (28 Dec 2017 02:00)  HR: 103 (28 Dec 2017 07:08) (103 - 116)  BP: 108/70 (28 Dec 2017 06:19) (103/60 - 121/76)  BP(mean): 81 (27 Dec 2017 17:00) (81 - 81)  ABP: --  ABP(mean): --  RR: 16 (28 Dec 2017 06:19) (16 - 18)  SpO2: 98% (28 Dec 2017 07:08) (96% - 100%)    Mode: AC/ CMV (Assist Control/ Continuous Mandatory Ventilation), RR (machine): 12, TV (machine): 450, FiO2: 40, PEEP: 5, ITime: 1, MAP: 10, PIP: 28    12-27 @ 07:01  -  12-28 @ 07:00  --------------------------------------------------------  IN: 1330 mL / OUT: 200 mL / NET: 1130 mL      CAPILLARY BLOOD GLUCOSE  146 (27 Dec 2017 18:00)      POCT Blood Glucose.: 107 mg/dL (28 Dec 2017 05:57)        HOSPITAL MEDICATIONS:  MEDICATIONS  (STANDING):  ALBUTerol    90 MICROgram(s) HFA Inhaler 4 Puff(s) Inhalation every 6 hours  buDESOnide 160 MICROgram(s)/formoterol 4.5 MICROgram(s) Inhaler 2 Puff(s) Inhalation two times a day  chlorhexidine 4% Liquid 1 Application(s) Topical daily  enoxaparin Injectable 40 milliGRAM(s) SubCutaneous daily  fentaNYL   Patch  50 MICROgram(s)/Hr 1 Patch Transdermal every 72 hours  ferrous    sulfate Liquid 300 milliGRAM(s) Enteral Tube daily  folic acid 1 milliGRAM(s) Oral daily  furosemide    Tablet 40 milliGRAM(s) Oral daily  insulin lispro (HumaLOG) corrective regimen sliding scale   SubCutaneous every 6 hours  ipratropium 17 MICROgram(s) HFA Inhaler 1 Puff(s) Inhalation every 6 hours  lactobacillus acidophilus 1 Tablet(s) Oral four times a day with meals  melatonin 3 milliGRAM(s) Oral at bedtime  pantoprazole  Injectable 40 milliGRAM(s) IV Push daily  valproic  acid Syrup 500 milliGRAM(s) Oral every 12 hours  vancomycin    Solution 500 milliGRAM(s) Oral every 6 hours  voriconazole IVPB 300 milliGRAM(s) IV Intermittent every 12 hours    MEDICATIONS  (PRN):  acetaminophen    Suspension. 650 milliGRAM(s) Enteral Tube every 6 hours PRN Mild Pain to moderate (1 - 6)  acetaminophen   Tablet 650 milliGRAM(s) Oral every 6 hours PRN For Temp greater than 38 C (100.4 F)  LORazepam   Injectable 0.5 milliGRAM(s) IV Push every 8 hours PRN Anxiety      LABS:                        7.5    11.28 )-----------( 238      ( 27 Dec 2017 05:55 )             23.5     12-27    138  |  87<L>  |  7   ----------------------------<  106<H>  3.6   |  43<H>  |  0.62    Ca    8.8      27 Dec 2017 05:55                MICROBIOLOGY:     RADIOLOGY:  [ ] Reviewed and interpreted by me    PULMONARY FUNCTION TESTS:    EKG: CHIEF COMPLAINT:  no complaints offered    Interval Events: stable overnight    REVIEW OF SYSTEMS:  CV: denies chest pain  Resp: denies SOB  [x] All other systems negative      OBJECTIVE:  ICU Vital Signs Last 24 Hrs  T(C): 37.4 (28 Dec 2017 06:19), Max: 37.6 (28 Dec 2017 02:00)  T(F): 99.3 (28 Dec 2017 06:19), Max: 99.6 (28 Dec 2017 02:00)  HR: 103 (28 Dec 2017 07:08) (103 - 116)  BP: 108/70 (28 Dec 2017 06:19) (103/60 - 121/76)  BP(mean): 81 (27 Dec 2017 17:00) (81 - 81)  RR: 16 (28 Dec 2017 06:19) (16 - 18)  SpO2: 98% (28 Dec 2017 07:08) (96% - 100%)    Mode: AC/ CMV (Assist Control/ Continuous Mandatory Ventilation), RR (machine): 12, TV (machine): 450, FiO2: 40, PEEP: 5, ITime: 1, MAP: 10, PIP: 28    12-27 @ 07:01  -  12-28 @ 07:00  --------------------------------------------------------  IN: 1330 mL / OUT: 200 mL / NET: 1130 mL      CAPILLARY BLOOD GLUCOSE  146 (27 Dec 2017 18:00)      POCT Blood Glucose.: 107 mg/dL (28 Dec 2017 05:57)        HOSPITAL MEDICATIONS:  MEDICATIONS  (STANDING):  ALBUTerol    90 MICROgram(s) HFA Inhaler 4 Puff(s) Inhalation every 6 hours  buDESOnide 160 MICROgram(s)/formoterol 4.5 MICROgram(s) Inhaler 2 Puff(s) Inhalation two times a day  chlorhexidine 4% Liquid 1 Application(s) Topical daily  enoxaparin Injectable 40 milliGRAM(s) SubCutaneous daily  fentaNYL   Patch  50 MICROgram(s)/Hr 1 Patch Transdermal every 72 hours  ferrous    sulfate Liquid 300 milliGRAM(s) Enteral Tube daily  folic acid 1 milliGRAM(s) Oral daily  furosemide    Tablet 40 milliGRAM(s) Oral daily  insulin lispro (HumaLOG) corrective regimen sliding scale   SubCutaneous every 6 hours  ipratropium 17 MICROgram(s) HFA Inhaler 1 Puff(s) Inhalation every 6 hours  lactobacillus acidophilus 1 Tablet(s) Oral four times a day with meals  melatonin 3 milliGRAM(s) Oral at bedtime  pantoprazole  Injectable 40 milliGRAM(s) IV Push daily  valproic  acid Syrup 500 milliGRAM(s) Oral every 12 hours  vancomycin    Solution 500 milliGRAM(s) Oral every 6 hours  voriconazole IVPB 300 milliGRAM(s) IV Intermittent every 12 hours    MEDICATIONS  (PRN):  acetaminophen    Suspension. 650 milliGRAM(s) Enteral Tube every 6 hours PRN Mild Pain to moderate (1 - 6)  acetaminophen   Tablet 650 milliGRAM(s) Oral every 6 hours PRN For Temp greater than 38 C (100.4 F)  LORazepam   Injectable 0.5 milliGRAM(s) IV Push every 8 hours PRN Anxiety

## 2017-12-28 NOTE — PROGRESS NOTE ADULT - PROBLEM SELECTOR PLAN 1
-Herniation of wound noted, likely due to weakened intercostal muscles combined with prolonged positive pressure vent/barotrauma  -Currently, not causing respiratory compromise   -Wound not tender  -However, wound will likely eventually progress, optimal treatement would require a re-operation with mesh reinforcement  -Not urgent, unlikely to be accepted at any facility with this wound  -Will look for operating room time next week  -Will follow with you  -Discussed with family

## 2017-12-28 NOTE — PROVIDER CONTACT NOTE (OTHER) - DATE AND TIME:
14-Dec-2017 20:02
15-Dec-2017 21:44
25-Dec-2017 10:45
07-Dec-2017 22:08
08-Dec-2017 00:06
08-Dec-2017 00:53
08-Dec-2017 06:22
08-Dec-2017 21:12
09-Dec-2017 18:40
09-Dec-2017 23:10
09-Nov-2017 13:20
10-Nov-2017 17:00
12-Dec-2017 19:10
13-Dec-2017 18:02
14-Dec-2017 01:15
15-Dec-2017 10:40
15-Dec-2017 11:50
15-Dec-2017 22:55
16-Dec-2017 01:20
16-Dec-2017 03:15
19-Dec-2017 12:38
19-Dec-2017 18:38
19-Dec-2017 22:30
20-Dec-2017 01:30
20-Dec-2017 03:30
22-Dec-2017 06:00
24-Nov-2017 23:50
25-Dec-2017 10:24
25-Nov-2017 05:32
28-Dec-2017 00:07
14-Dec-2017 05:31

## 2017-12-28 NOTE — PROGRESS NOTE ADULT - ASSESSMENT
46 y/o with ens stage sarcoid disease with profound hemoptysis requiring urgent left upper lobectomy now with lung herniation

## 2017-12-28 NOTE — PROGRESS NOTE ADULT - PROBLEM SELECTOR PLAN 5
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

## 2017-12-28 NOTE — PROGRESS NOTE ADULT - PROBLEM SELECTOR PLAN 7
- lovenox - Vanco via PEG increase to 500mg on 12/19  - contact precautions   - stools improving  - changed feeds and added lactobacillus  - consider GI eval if feeds are not an issue  - monitor stools

## 2017-12-28 NOTE — PROVIDER CONTACT NOTE (OTHER) - BACKGROUND
47 yr m, dx hemotysis, hx cva
Patient admitted with hemoptysis
Pt admitted for hemoptysis
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
47 yr m, dx hemotysis, hx cva
47 yr m, dx hemotysis, hx cva
HX:
Patient admitted for hemoptysis
Patient admitted with hemoptysis
Patient admitted with hemoptysis
Patient admitted with hemoptysis.
Patient admitted with hemoptysis.
Patient intubated and sedated, NPO with no orogastric access.
Patient s/p trach, intubated and sedated with no OG access.
Pt admitted for hemoptysis
Pt admitted for hemoptysis
Pt admitted for hemoptysis, positive for c diff, tx vanco
Pt admitted for hemoptysis. Currently being treated for c diff.
patient becomes anxious & overwhelmed easily
pt adm for hemoptosis
pt adm for hemoptosis
pt adm for hemotysis/aspergilloma
pt adm with hemoptosis
pt admitted wit hemoptysis
Patient admitted with hemoptysis

## 2017-12-28 NOTE — PROGRESS NOTE ADULT - PROBLEM SELECTOR PLAN 2
- s/p trach  - tolerating current vent settings  - will wean as tolerated (tolerating minimal time periods of CPAP)  - monitor respiratory status  - daily trach care  - suction prn CT surgery eval appreciated  for OR repair next week

## 2017-12-28 NOTE — PROGRESS NOTE ADULT - ATTENDING COMMENTS
stable hemodyn and resp status.  ventilator dependent.  cdiff, diarrhea has resolved.    thoracic surgery f/u appreciated.  BPF with external tract now.  Planning for OR .    d/w pt and his girlfriend at bedside

## 2017-12-28 NOTE — PROGRESS NOTE ADULT - PROBLEM SELECTOR PLAN 6
- Vanco via PEG increase to 500mg on 12/19  - contact precautions   - stools improving  - changed feeds and added lactobacillus  - consider GI eval if feeds are not an issue  - monitor stools - cont bronchodilators   - cont nebs

## 2017-12-29 LAB
BUN SERPL-MCNC: 6 MG/DL — LOW (ref 7–23)
CALCIUM SERPL-MCNC: 8.7 MG/DL — SIGNIFICANT CHANGE UP (ref 8.4–10.5)
CHLORIDE SERPL-SCNC: 89 MMOL/L — LOW (ref 98–107)
CO2 SERPL-SCNC: 44 MMOL/L — HIGH (ref 22–31)
CREAT SERPL-MCNC: 0.57 MG/DL — SIGNIFICANT CHANGE UP (ref 0.5–1.3)
GLUCOSE SERPL-MCNC: 133 MG/DL — HIGH (ref 70–99)
HCT VFR BLD CALC: 22.1 % — LOW (ref 39–50)
HCT VFR BLD CALC: 23.6 % — LOW (ref 39–50)
HGB BLD-MCNC: 6.9 G/DL — CRITICAL LOW (ref 13–17)
HGB BLD-MCNC: 7.3 G/DL — LOW (ref 13–17)
MAGNESIUM SERPL-MCNC: 1.8 MG/DL — SIGNIFICANT CHANGE UP (ref 1.6–2.6)
MCHC RBC-ENTMCNC: 28.4 PG — SIGNIFICANT CHANGE UP (ref 27–34)
MCHC RBC-ENTMCNC: 28.5 PG — SIGNIFICANT CHANGE UP (ref 27–34)
MCHC RBC-ENTMCNC: 30.9 % — LOW (ref 32–36)
MCHC RBC-ENTMCNC: 31.2 % — LOW (ref 32–36)
MCV RBC AUTO: 90.9 FL — SIGNIFICANT CHANGE UP (ref 80–100)
MCV RBC AUTO: 92.2 FL — SIGNIFICANT CHANGE UP (ref 80–100)
NRBC # FLD: 0 — SIGNIFICANT CHANGE UP
NRBC # FLD: 0 — SIGNIFICANT CHANGE UP
PHOSPHATE SERPL-MCNC: 4 MG/DL — SIGNIFICANT CHANGE UP (ref 2.5–4.5)
PLATELET # BLD AUTO: 218 K/UL — SIGNIFICANT CHANGE UP (ref 150–400)
PLATELET # BLD AUTO: 224 K/UL — SIGNIFICANT CHANGE UP (ref 150–400)
PMV BLD: 12.8 FL — SIGNIFICANT CHANGE UP (ref 7–13)
PMV BLD: 13 FL — SIGNIFICANT CHANGE UP (ref 7–13)
POTASSIUM SERPL-MCNC: 3.3 MMOL/L — LOW (ref 3.5–5.3)
POTASSIUM SERPL-SCNC: 3.3 MMOL/L — LOW (ref 3.5–5.3)
RBC # BLD: 2.43 M/UL — LOW (ref 4.2–5.8)
RBC # BLD: 2.56 M/UL — LOW (ref 4.2–5.8)
RBC # FLD: 15.9 % — HIGH (ref 10.3–14.5)
RBC # FLD: 15.9 % — HIGH (ref 10.3–14.5)
SODIUM SERPL-SCNC: 140 MMOL/L — SIGNIFICANT CHANGE UP (ref 135–145)
WBC # BLD: 10.22 K/UL — SIGNIFICANT CHANGE UP (ref 3.8–10.5)
WBC # BLD: 9.7 K/UL — SIGNIFICANT CHANGE UP (ref 3.8–10.5)
WBC # FLD AUTO: 10.22 K/UL — SIGNIFICANT CHANGE UP (ref 3.8–10.5)
WBC # FLD AUTO: 9.7 K/UL — SIGNIFICANT CHANGE UP (ref 3.8–10.5)

## 2017-12-29 PROCEDURE — 99233 SBSQ HOSP IP/OBS HIGH 50: CPT | Mod: GC

## 2017-12-29 RX ORDER — POTASSIUM CHLORIDE 20 MEQ
40 PACKET (EA) ORAL ONCE
Qty: 0 | Refills: 0 | Status: COMPLETED | OUTPATIENT
Start: 2017-12-29 | End: 2017-12-29

## 2017-12-29 RX ADMIN — ALBUTEROL 4 PUFF(S): 90 AEROSOL, METERED ORAL at 22:26

## 2017-12-29 RX ADMIN — Medication 1 PUFF(S): at 16:27

## 2017-12-29 RX ADMIN — Medication 1 TABLET(S): at 12:19

## 2017-12-29 RX ADMIN — Medication 1 PUFF(S): at 04:42

## 2017-12-29 RX ADMIN — Medication 500 MILLIGRAM(S): at 23:35

## 2017-12-29 RX ADMIN — Medication 1 TABLET(S): at 21:11

## 2017-12-29 RX ADMIN — Medication 500 MILLIGRAM(S): at 05:06

## 2017-12-29 RX ADMIN — VORICONAZOLE 125 MILLIGRAM(S): 10 INJECTION, POWDER, LYOPHILIZED, FOR SOLUTION INTRAVENOUS at 05:03

## 2017-12-29 RX ADMIN — ALBUTEROL 4 PUFF(S): 90 AEROSOL, METERED ORAL at 10:39

## 2017-12-29 RX ADMIN — Medication 500 MILLIGRAM(S): at 12:19

## 2017-12-29 RX ADMIN — PANTOPRAZOLE SODIUM 40 MILLIGRAM(S): 20 TABLET, DELAYED RELEASE ORAL at 12:19

## 2017-12-29 RX ADMIN — Medication 300 MILLIGRAM(S): at 12:19

## 2017-12-29 RX ADMIN — Medication 1 MILLIGRAM(S): at 12:19

## 2017-12-29 RX ADMIN — Medication 500 MILLIGRAM(S): at 18:09

## 2017-12-29 RX ADMIN — Medication 0.5 MILLIGRAM(S): at 23:35

## 2017-12-29 RX ADMIN — Medication 1 PUFF(S): at 22:26

## 2017-12-29 RX ADMIN — BUDESONIDE AND FORMOTEROL FUMARATE DIHYDRATE 2 PUFF(S): 160; 4.5 AEROSOL RESPIRATORY (INHALATION) at 10:41

## 2017-12-29 RX ADMIN — Medication 1 TABLET(S): at 08:17

## 2017-12-29 RX ADMIN — Medication 40 MILLIEQUIVALENT(S): at 10:12

## 2017-12-29 RX ADMIN — CHLORHEXIDINE GLUCONATE 1 APPLICATION(S): 213 SOLUTION TOPICAL at 12:00

## 2017-12-29 RX ADMIN — ENOXAPARIN SODIUM 40 MILLIGRAM(S): 100 INJECTION SUBCUTANEOUS at 12:19

## 2017-12-29 RX ADMIN — ALBUTEROL 4 PUFF(S): 90 AEROSOL, METERED ORAL at 04:40

## 2017-12-29 RX ADMIN — Medication 1 TABLET(S): at 18:09

## 2017-12-29 RX ADMIN — ALBUTEROL 4 PUFF(S): 90 AEROSOL, METERED ORAL at 16:27

## 2017-12-29 RX ADMIN — Medication 40 MILLIGRAM(S): at 05:06

## 2017-12-29 RX ADMIN — Medication 1 PUFF(S): at 10:40

## 2017-12-29 RX ADMIN — Medication 650 MILLIGRAM(S): at 12:17

## 2017-12-29 RX ADMIN — BUDESONIDE AND FORMOTEROL FUMARATE DIHYDRATE 2 PUFF(S): 160; 4.5 AEROSOL RESPIRATORY (INHALATION) at 22:26

## 2017-12-29 RX ADMIN — Medication 650 MILLIGRAM(S): at 12:43

## 2017-12-29 NOTE — PROGRESS NOTE ADULT - PROBLEM SELECTOR PLAN 6
- Vanco via PEG increased to 500mg on 12/19  - contact precautions   - stools improving  - changed feeds and added lactobacillus  - consider GI eval if feeds are not an issue  - monitor stools

## 2017-12-29 NOTE — PROGRESS NOTE ADULT - SUBJECTIVE AND OBJECTIVE BOX
CHIEF COMPLAINT:    Interval Events:    REVIEW OF SYSTEMS:  Constitutional:   Eyes:  ENT:  CV:  Resp:  GI:  :  MSK:  Integumentary:  Neurological:  Psychiatric:  Endocrine:  Hematologic/Lymphatic:  Allergic/Immunologic:  [ ] All other systems negative  [ ] Unable to assess ROS because ________    OBJECTIVE:  ICU Vital Signs Last 24 Hrs  T(C): 36.9 (29 Dec 2017 05:08), Max: 37.7 (28 Dec 2017 17:18)  T(F): 98.5 (29 Dec 2017 05:08), Max: 99.9 (28 Dec 2017 17:18)  HR: 98 (29 Dec 2017 07:10) (96 - 112)  BP: 111/69 (29 Dec 2017 05:08) (111/69 - 126/88)  BP(mean): --  ABP: --  ABP(mean): --  RR: 16 (29 Dec 2017 05:08) (16 - 18)  SpO2: 100% (29 Dec 2017 07:10) (99% - 100%)    Mode: AC/ CMV (Assist Control/ Continuous Mandatory Ventilation), RR (machine): 12, TV (machine): 450, FiO2: 40, PEEP: 4, ITime: 1, MAP: 11, PIP: 22    12-28 @ 07:01  -  12-29 @ 07:00  --------------------------------------------------------  IN: 1290 mL / OUT: 1350 mL / NET: -60 mL      CAPILLARY BLOOD GLUCOSE  146 (27 Dec 2017 18:00)      POCT Blood Glucose.: 122 mg/dL (28 Dec 2017 18:19)      PHYSICAL EXAM:  General:   HEENT:   Lymph Nodes:  Neck:   Respiratory:   Cardiovascular:   Abdomen:   Extremities:   Skin:   Neurological:  Psychiatry:    HOSPITAL MEDICATIONS:  MEDICATIONS  (STANDING):  ALBUTerol    90 MICROgram(s) HFA Inhaler 4 Puff(s) Inhalation every 6 hours  buDESOnide 160 MICROgram(s)/formoterol 4.5 MICROgram(s) Inhaler 2 Puff(s) Inhalation two times a day  chlorhexidine 4% Liquid 1 Application(s) Topical daily  enoxaparin Injectable 40 milliGRAM(s) SubCutaneous daily  fentaNYL   Patch  50 MICROgram(s)/Hr 1 Patch Transdermal every 72 hours  ferrous    sulfate Liquid 300 milliGRAM(s) Enteral Tube daily  folic acid 1 milliGRAM(s) Oral daily  furosemide    Tablet 40 milliGRAM(s) Oral daily  ipratropium 17 MICROgram(s) HFA Inhaler 1 Puff(s) Inhalation every 6 hours  lactobacillus acidophilus 1 Tablet(s) Oral four times a day with meals  melatonin 3 milliGRAM(s) Oral at bedtime  pantoprazole  Injectable 40 milliGRAM(s) IV Push daily  potassium chloride   Powder 40 milliEquivalent(s) Oral once  valproic  acid Syrup 500 milliGRAM(s) Oral every 12 hours  vancomycin    Solution 500 milliGRAM(s) Oral every 6 hours    MEDICATIONS  (PRN):  acetaminophen    Suspension. 650 milliGRAM(s) Enteral Tube every 6 hours PRN Mild Pain to moderate (1 - 6)  acetaminophen   Tablet 650 milliGRAM(s) Oral every 6 hours PRN For Temp greater than 38 C (100.4 F)  LORazepam   Injectable 0.5 milliGRAM(s) IV Push every 8 hours PRN Anxiety      LABS:                        7.3    10.22 )-----------( 224      ( 29 Dec 2017 08:21 )             23.6     12-29    140  |  89<L>  |  6<L>  ----------------------------<  133<H>  3.3<L>   |  44<H>  |  0.57    Ca    8.7      29 Dec 2017 06:50  Phos  4.0     12-29  Mg     1.8     12-29                MICROBIOLOGY:     RADIOLOGY:  [ ] Reviewed and interpreted by me    PULMONARY FUNCTION TESTS:    EKG: CHIEF COMPLAINT: Patient is a 47y old  Male who presents with a chief complaint of Patient is a 47y old  Male who presents with a chief complaint of hemoptysis (09 Nov 2017 00:32) (09 Dec 2017 16:29)    Interval Events: No acute events overnight    REVIEW OF SYSTEMS:  Constitutional:   Eyes:  ENT:  CV: c/o discomfort at left lower chestwall opening site  Resp: c/o intermittent sob  GI:  :  MSK:  Integumentary:  Neurological:  Psychiatric:  Endocrine:  Hematologic/Lymphatic:  Allergic/Immunologic:  [x] All other systems negative  [ ] Unable to assess ROS because ________    OBJECTIVE:  ICU Vital Signs Last 24 Hrs  T(C): 36.9 (29 Dec 2017 05:08), Max: 37.7 (28 Dec 2017 17:18)  T(F): 98.5 (29 Dec 2017 05:08), Max: 99.9 (28 Dec 2017 17:18)  HR: 98 (29 Dec 2017 07:10) (96 - 112)  BP: 111/69 (29 Dec 2017 05:08) (111/69 - 126/88)  BP(mean): --  ABP: --  ABP(mean): --  RR: 16 (29 Dec 2017 05:08) (16 - 18)  SpO2: 100% (29 Dec 2017 07:10) (99% - 100%)    Mode: AC/ CMV (Assist Control/ Continuous Mandatory Ventilation), RR (machine): 12, TV (machine): 450, FiO2: 40, PEEP: 4, ITime: 1, MAP: 11, PIP: 22    12-28 @ 07:01  -  12-29 @ 07:00  --------------------------------------------------------  IN: 1290 mL / OUT: 1350 mL / NET: -60 mL      CAPILLARY BLOOD GLUCOSE  146 (27 Dec 2017 18:00)      POCT Blood Glucose.: 122 mg/dL (28 Dec 2017 18:19)    HOSPITAL MEDICATIONS:  MEDICATIONS  (STANDING):  ALBUTerol    90 MICROgram(s) HFA Inhaler 4 Puff(s) Inhalation every 6 hours  buDESOnide 160 MICROgram(s)/formoterol 4.5 MICROgram(s) Inhaler 2 Puff(s) Inhalation two times a day  chlorhexidine 4% Liquid 1 Application(s) Topical daily  enoxaparin Injectable 40 milliGRAM(s) SubCutaneous daily  fentaNYL   Patch  50 MICROgram(s)/Hr 1 Patch Transdermal every 72 hours  ferrous    sulfate Liquid 300 milliGRAM(s) Enteral Tube daily  folic acid 1 milliGRAM(s) Oral daily  furosemide    Tablet 40 milliGRAM(s) Oral daily  ipratropium 17 MICROgram(s) HFA Inhaler 1 Puff(s) Inhalation every 6 hours  lactobacillus acidophilus 1 Tablet(s) Oral four times a day with meals  melatonin 3 milliGRAM(s) Oral at bedtime  pantoprazole  Injectable 40 milliGRAM(s) IV Push daily  potassium chloride   Powder 40 milliEquivalent(s) Oral once  valproic  acid Syrup 500 milliGRAM(s) Oral every 12 hours  vancomycin    Solution 500 milliGRAM(s) Oral every 6 hours    MEDICATIONS  (PRN):  acetaminophen    Suspension. 650 milliGRAM(s) Enteral Tube every 6 hours PRN Mild Pain to moderate (1 - 6)  acetaminophen   Tablet 650 milliGRAM(s) Oral every 6 hours PRN For Temp greater than 38 C (100.4 F)  LORazepam   Injectable 0.5 milliGRAM(s) IV Push every 8 hours PRN Anxiety      LABS:                        7.3    10.22 )-----------( 224      ( 29 Dec 2017 08:21 )             23.6     12-29    140  |  89<L>  |  6<L>  ----------------------------<  133<H>  3.3<L>   |  44<H>  |  0.57    Ca    8.7      29 Dec 2017 06:50  Phos  4.0     12-29  Mg     1.8     12-29                MICROBIOLOGY:     RADIOLOGY:  [ ] Reviewed and interpreted by me    PULMONARY FUNCTION TESTS:    EKG:

## 2017-12-29 NOTE — PROGRESS NOTE ADULT - ATTENDING COMMENTS
Stable hemodynamic and resp status  BPF with extension through chest wall  awaiting thoracic surgery f/u decision on OR  still with loose stool, but overall improving

## 2017-12-29 NOTE — PROGRESS NOTE ADULT - PROBLEM SELECTOR PLAN 1
- fevers resolved  - completed voriconazole (h/o aspergillosis with sarcoidosis)  12/28 per ID  - s/p completion of levaquin

## 2017-12-30 LAB
BUN SERPL-MCNC: 8 MG/DL — SIGNIFICANT CHANGE UP (ref 7–23)
CALCIUM SERPL-MCNC: 8.7 MG/DL — SIGNIFICANT CHANGE UP (ref 8.4–10.5)
CHLORIDE SERPL-SCNC: 90 MMOL/L — LOW (ref 98–107)
CO2 SERPL-SCNC: 42 MMOL/L — HIGH (ref 22–31)
CREAT SERPL-MCNC: 0.6 MG/DL — SIGNIFICANT CHANGE UP (ref 0.5–1.3)
GLUCOSE SERPL-MCNC: 105 MG/DL — HIGH (ref 70–99)
HCT VFR BLD CALC: 22 % — LOW (ref 39–50)
HGB BLD-MCNC: 7 G/DL — CRITICAL LOW (ref 13–17)
MCHC RBC-ENTMCNC: 29.7 PG — SIGNIFICANT CHANGE UP (ref 27–34)
MCHC RBC-ENTMCNC: 31.8 % — LOW (ref 32–36)
MCV RBC AUTO: 93.2 FL — SIGNIFICANT CHANGE UP (ref 80–100)
NRBC # FLD: 0 — SIGNIFICANT CHANGE UP
PLATELET # BLD AUTO: 211 K/UL — SIGNIFICANT CHANGE UP (ref 150–400)
PMV BLD: 12.8 FL — SIGNIFICANT CHANGE UP (ref 7–13)
POTASSIUM SERPL-MCNC: 3.6 MMOL/L — SIGNIFICANT CHANGE UP (ref 3.5–5.3)
POTASSIUM SERPL-SCNC: 3.6 MMOL/L — SIGNIFICANT CHANGE UP (ref 3.5–5.3)
RBC # BLD: 2.36 M/UL — LOW (ref 4.2–5.8)
RBC # FLD: 16.1 % — HIGH (ref 10.3–14.5)
SODIUM SERPL-SCNC: 138 MMOL/L — SIGNIFICANT CHANGE UP (ref 135–145)
WBC # BLD: 9.53 K/UL — SIGNIFICANT CHANGE UP (ref 3.8–10.5)
WBC # FLD AUTO: 9.53 K/UL — SIGNIFICANT CHANGE UP (ref 3.8–10.5)

## 2017-12-30 PROCEDURE — 99233 SBSQ HOSP IP/OBS HIGH 50: CPT | Mod: GC

## 2017-12-30 RX ORDER — OXYCODONE HYDROCHLORIDE 5 MG/1
10 TABLET ORAL ONCE
Qty: 0 | Refills: 0 | Status: DISCONTINUED | OUTPATIENT
Start: 2017-12-30 | End: 2017-12-30

## 2017-12-30 RX ADMIN — ALBUTEROL 4 PUFF(S): 90 AEROSOL, METERED ORAL at 09:56

## 2017-12-30 RX ADMIN — Medication 1 PUFF(S): at 15:36

## 2017-12-30 RX ADMIN — ENOXAPARIN SODIUM 40 MILLIGRAM(S): 100 INJECTION SUBCUTANEOUS at 11:57

## 2017-12-30 RX ADMIN — Medication 1 TABLET(S): at 10:03

## 2017-12-30 RX ADMIN — BUDESONIDE AND FORMOTEROL FUMARATE DIHYDRATE 2 PUFF(S): 160; 4.5 AEROSOL RESPIRATORY (INHALATION) at 23:00

## 2017-12-30 RX ADMIN — Medication 500 MILLIGRAM(S): at 17:22

## 2017-12-30 RX ADMIN — ALBUTEROL 4 PUFF(S): 90 AEROSOL, METERED ORAL at 03:37

## 2017-12-30 RX ADMIN — ALBUTEROL 4 PUFF(S): 90 AEROSOL, METERED ORAL at 15:36

## 2017-12-30 RX ADMIN — Medication 500 MILLIGRAM(S): at 11:57

## 2017-12-30 RX ADMIN — Medication 300 MILLIGRAM(S): at 11:57

## 2017-12-30 RX ADMIN — PANTOPRAZOLE SODIUM 40 MILLIGRAM(S): 20 TABLET, DELAYED RELEASE ORAL at 11:57

## 2017-12-30 RX ADMIN — OXYCODONE HYDROCHLORIDE 10 MILLIGRAM(S): 5 TABLET ORAL at 22:37

## 2017-12-30 RX ADMIN — ALBUTEROL 4 PUFF(S): 90 AEROSOL, METERED ORAL at 23:00

## 2017-12-30 RX ADMIN — Medication 1 TABLET(S): at 22:06

## 2017-12-30 RX ADMIN — Medication 40 MILLIGRAM(S): at 05:14

## 2017-12-30 RX ADMIN — Medication 1 TABLET(S): at 17:23

## 2017-12-30 RX ADMIN — OXYCODONE HYDROCHLORIDE 10 MILLIGRAM(S): 5 TABLET ORAL at 23:25

## 2017-12-30 RX ADMIN — Medication 1 PUFF(S): at 09:56

## 2017-12-30 RX ADMIN — Medication 650 MILLIGRAM(S): at 13:45

## 2017-12-30 RX ADMIN — Medication 1 MILLIGRAM(S): at 11:57

## 2017-12-30 RX ADMIN — Medication 1 PUFF(S): at 23:00

## 2017-12-30 RX ADMIN — Medication 500 MILLIGRAM(S): at 23:57

## 2017-12-30 RX ADMIN — Medication 500 MILLIGRAM(S): at 05:15

## 2017-12-30 RX ADMIN — Medication 500 MILLIGRAM(S): at 05:12

## 2017-12-30 RX ADMIN — FENTANYL CITRATE 1 PATCH: 50 INJECTION INTRAVENOUS at 11:57

## 2017-12-30 RX ADMIN — BUDESONIDE AND FORMOTEROL FUMARATE DIHYDRATE 2 PUFF(S): 160; 4.5 AEROSOL RESPIRATORY (INHALATION) at 09:56

## 2017-12-30 RX ADMIN — CHLORHEXIDINE GLUCONATE 1 APPLICATION(S): 213 SOLUTION TOPICAL at 11:56

## 2017-12-30 RX ADMIN — Medication 650 MILLIGRAM(S): at 13:04

## 2017-12-30 RX ADMIN — Medication 500 MILLIGRAM(S): at 17:24

## 2017-12-30 RX ADMIN — Medication 1 TABLET(S): at 13:04

## 2017-12-30 RX ADMIN — Medication 1 PUFF(S): at 03:37

## 2017-12-30 NOTE — PROGRESS NOTE ADULT - ATTENDING COMMENTS
pt seen and examined Stable hemodynamic and resp status  BPF with extension through chest wall  awaiting thoracic surgery f/u decision on OR  still with loose stool, but overall improving

## 2017-12-30 NOTE — PROVIDER CONTACT NOTE (CRITICAL VALUE NOTIFICATION) - ACTION/TREATMENT ORDERED:
no action ordered at this time. will cont to monitor
ABG ordered. continue to monitor.
monitor
no action ordered at this time. will continue to monitor
No tx ordered at the moment.
PROVIDER AWARE. WILL CONTINUE TO MONITOR.
continue to monitor

## 2017-12-30 NOTE — PROGRESS NOTE ADULT - SUBJECTIVE AND OBJECTIVE BOX
CHIEF COMPLAINT:    Interval Events:    REVIEW OF SYSTEMS:  Constitutional:   Eyes:  ENT:  CV:  Resp:  GI:  :  MSK:  Integumentary:  Neurological:  Psychiatric:  Endocrine:  Hematologic/Lymphatic:  Allergic/Immunologic:  [ ] All other systems negative  [ ] Unable to assess ROS because ________    OBJECTIVE:  ICU Vital Signs Last 24 Hrs  T(C): 37.1 (30 Dec 2017 10:05), Max: 37.3 (30 Dec 2017 05:03)  T(F): 98.8 (30 Dec 2017 10:05), Max: 99.1 (30 Dec 2017 05:03)  HR: 100 (30 Dec 2017 10:05) (95 - 112)  BP: 119/84 (30 Dec 2017 10:05) (106/66 - 120/93)  BP(mean): --  ABP: --  ABP(mean): --  RR: 16 (30 Dec 2017 10:05) (14 - 16)  SpO2: 100% (30 Dec 2017 10:05) (99% - 100%)    Mode: AC/ CMV (Assist Control/ Continuous Mandatory Ventilation), RR (machine): 12, TV (machine): 450, FiO2: 403, PEEP: 5, ITime: 1, MAP: 10, PIP: 26    12-29 @ 07:01  -  12-30 @ 07:00  --------------------------------------------------------  IN: 600 mL / OUT: 300 mL / NET: 300 mL      CAPILLARY BLOOD GLUCOSE      POCT Blood Glucose.: 122 mg/dL (28 Dec 2017 18:19)      PHYSICAL EXAM:  General:   HEENT:   Lymph Nodes:  Neck:   Respiratory:   Cardiovascular:   Abdomen:   Extremities:   Skin:   Neurological:  Psychiatry:    HOSPITAL MEDICATIONS:  MEDICATIONS  (STANDING):  ALBUTerol    90 MICROgram(s) HFA Inhaler 4 Puff(s) Inhalation every 6 hours  buDESOnide 160 MICROgram(s)/formoterol 4.5 MICROgram(s) Inhaler 2 Puff(s) Inhalation two times a day  chlorhexidine 4% Liquid 1 Application(s) Topical daily  enoxaparin Injectable 40 milliGRAM(s) SubCutaneous daily  fentaNYL   Patch  50 MICROgram(s)/Hr 1 Patch Transdermal every 72 hours  ferrous    sulfate Liquid 300 milliGRAM(s) Enteral Tube daily  folic acid 1 milliGRAM(s) Oral daily  ipratropium 17 MICROgram(s) HFA Inhaler 1 Puff(s) Inhalation every 6 hours  lactobacillus acidophilus 1 Tablet(s) Oral four times a day with meals  melatonin 3 milliGRAM(s) Oral at bedtime  pantoprazole  Injectable 40 milliGRAM(s) IV Push daily  valproic  acid Syrup 500 milliGRAM(s) Oral every 12 hours  vancomycin    Solution 500 milliGRAM(s) Oral every 6 hours    MEDICATIONS  (PRN):  acetaminophen    Suspension. 650 milliGRAM(s) Enteral Tube every 6 hours PRN Mild Pain to moderate (1 - 6)  LORazepam   Injectable 0.5 milliGRAM(s) IV Push every 8 hours PRN Anxiety      LABS:                        7.0    9.53  )-----------( 211      ( 30 Dec 2017 06:17 )             22.0     12-30    138  |  90<L>  |  8   ----------------------------<  105<H>  3.6   |  42<H>  |  0.60    Ca    8.7      30 Dec 2017 06:17  Phos  4.0     12-29  Mg     1.8     12-29                MICROBIOLOGY:     RADIOLOGY:  [ ] Reviewed and interpreted by me    PULMONARY FUNCTION TESTS:    EKG: CHIEF COMPLAINT: Patient is a 47y old  Male who presents with a chief complaint of Patient is a 47y old  Male who presents with a chief complaint of hemoptysis (09 Nov 2017 00:32) (09 Dec 2017 16:29)    Interval Events: No acute events overnight    REVIEW OF SYSTEMS:  Constitutional: Feels frustrated  Eyes: No concerns  ENT: No difficulty with trach  CV: Denies CP  Resp: c/o intermittent SOB  GI: c/o continued diarrhea, although it has improved  : Condom cath in place. Per patient it is still necessary  MSK:  Integumentary:  Neurological:  Psychiatric: c/o being frustrated  Endocrine:  Hematologic/Lymphatic:  Allergic/Immunologic:  [x] All other systems negative  [ ] Unable to assess ROS because ________    OBJECTIVE:  ICU Vital Signs Last 24 Hrs  T(C): 37.1 (30 Dec 2017 10:05), Max: 37.3 (30 Dec 2017 05:03)  T(F): 98.8 (30 Dec 2017 10:05), Max: 99.1 (30 Dec 2017 05:03)  HR: 100 (30 Dec 2017 10:05) (95 - 112)  BP: 119/84 (30 Dec 2017 10:05) (106/66 - 120/93)  BP(mean): --  ABP: --  ABP(mean): --  RR: 16 (30 Dec 2017 10:05) (14 - 16)  SpO2: 100% (30 Dec 2017 10:05) (99% - 100%)    Mode: AC/ CMV (Assist Control/ Continuous Mandatory Ventilation), RR (machine): 12, TV (machine): 450, FiO2: 403, PEEP: 5, ITime: 1, MAP: 10, PIP: 26    12-29 @ 07:01  -  12-30 @ 07:00  --------------------------------------------------------  IN: 600 mL / OUT: 300 mL / NET: 300 mL      CAPILLARY BLOOD GLUCOSE      POCT Blood Glucose.: 122 mg/dL (28 Dec 2017 18:19)      HOSPITAL MEDICATIONS:  MEDICATIONS  (STANDING):  ALBUTerol    90 MICROgram(s) HFA Inhaler 4 Puff(s) Inhalation every 6 hours  buDESOnide 160 MICROgram(s)/formoterol 4.5 MICROgram(s) Inhaler 2 Puff(s) Inhalation two times a day  chlorhexidine 4% Liquid 1 Application(s) Topical daily  enoxaparin Injectable 40 milliGRAM(s) SubCutaneous daily  fentaNYL   Patch  50 MICROgram(s)/Hr 1 Patch Transdermal every 72 hours  ferrous    sulfate Liquid 300 milliGRAM(s) Enteral Tube daily  folic acid 1 milliGRAM(s) Oral daily  ipratropium 17 MICROgram(s) HFA Inhaler 1 Puff(s) Inhalation every 6 hours  lactobacillus acidophilus 1 Tablet(s) Oral four times a day with meals  melatonin 3 milliGRAM(s) Oral at bedtime  pantoprazole  Injectable 40 milliGRAM(s) IV Push daily  valproic  acid Syrup 500 milliGRAM(s) Oral every 12 hours  vancomycin    Solution 500 milliGRAM(s) Oral every 6 hours    MEDICATIONS  (PRN):  acetaminophen    Suspension. 650 milliGRAM(s) Enteral Tube every 6 hours PRN Mild Pain to moderate (1 - 6)  LORazepam   Injectable 0.5 milliGRAM(s) IV Push every 8 hours PRN Anxiety      LABS:                        7.0    9.53  )-----------( 211      ( 30 Dec 2017 06:17 )             22.0     12-30    138  |  90<L>  |  8   ----------------------------<  105<H>  3.6   |  42<H>  |  0.60    Ca    8.7      30 Dec 2017 06:17  Phos  4.0     12-29  Mg     1.8     12-29                MICROBIOLOGY:     RADIOLOGY:  [ ] Reviewed and interpreted by me    PULMONARY FUNCTION TESTS:    EKG:

## 2017-12-30 NOTE — PROVIDER CONTACT NOTE (CRITICAL VALUE NOTIFICATION) - ASSESSMENT
patient stable.
no altered mental status, v/s stable.
patient stable.
Alert, no sign of distress noted
PATIENT ALERT AND ORIENTED X4 ABLE TO MAKE NEEDS KNOWN. CONTINUES ON VENT TV-450 RR-12 02-40 PEEP-5
Pt vent dependent, pt comfortable. Vitals in flowsheet.
no active bleeding visible
pt alert. no s/s of distress

## 2017-12-30 NOTE — PROVIDER CONTACT NOTE (CRITICAL VALUE NOTIFICATION) - NAME OF MD/NP/PA/DO NOTIFIED:
Donna MILSL
GAYATRI MILLS NP
John ZURITA ([facility])
Mai DUMAS
Mai Uribe NP
Pepe Courtney
deysi hopson, np
John ZURITA([facility])

## 2017-12-30 NOTE — PROVIDER CONTACT NOTE (CRITICAL VALUE NOTIFICATION) - RECOMMENDATIONS
PA assess
pa assess
No recommendations at the moment.
PROVIDER NOTIFIED
continue to monitor
will assess pt

## 2017-12-30 NOTE — PROVIDER CONTACT NOTE (CRITICAL VALUE NOTIFICATION) - PERSON GIVING RESULT:
Linda pitt
Rocael
Rosio Sampson
Vannesa Cope
WILFRED dimas
enrico aquino - toxiciology
randi QUIÑONES- sweetie
anna lopez sweetie

## 2017-12-30 NOTE — PROVIDER CONTACT NOTE (CRITICAL VALUE NOTIFICATION) - BACKGROUND
pt adm for hemoptosis
47 yr m, adm for hemotysis
COPD, HTN, ASTHMA,SACRCOIDOSIS, L-STROKE HX-PNEUMOTHORAX
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
pt admitted with hemoptysis
pt dx hemoptysis

## 2017-12-30 NOTE — PROGRESS NOTE ADULT - MUSCULOSKELETAL COMMENTS
Strength slowly improving. left side weaker than right
Strength improving slowly
Very weak. Left strength less than right
Left side weaker than right
Weak but strength improving. Left weaker than right at baseline
Left weaker than right
Strength improving. Left weaker than right
left side weaker than right

## 2017-12-31 DIAGNOSIS — F99 MENTAL DISORDER, NOT OTHERWISE SPECIFIED: ICD-10-CM

## 2017-12-31 LAB
BUN SERPL-MCNC: 7 MG/DL — SIGNIFICANT CHANGE UP (ref 7–23)
CALCIUM SERPL-MCNC: 9.1 MG/DL — SIGNIFICANT CHANGE UP (ref 8.4–10.5)
CHLORIDE SERPL-SCNC: 91 MMOL/L — LOW (ref 98–107)
CO2 SERPL-SCNC: 41 MMOL/L — HIGH (ref 22–31)
CREAT SERPL-MCNC: 0.57 MG/DL — SIGNIFICANT CHANGE UP (ref 0.5–1.3)
GLUCOSE SERPL-MCNC: 107 MG/DL — HIGH (ref 70–99)
HCT VFR BLD CALC: 24.9 % — LOW (ref 39–50)
HGB BLD-MCNC: 7.5 G/DL — LOW (ref 13–17)
MCHC RBC-ENTMCNC: 28.7 PG — SIGNIFICANT CHANGE UP (ref 27–34)
MCHC RBC-ENTMCNC: 30.1 % — LOW (ref 32–36)
MCV RBC AUTO: 95.4 FL — SIGNIFICANT CHANGE UP (ref 80–100)
NRBC # FLD: 0 — SIGNIFICANT CHANGE UP
PLATELET # BLD AUTO: 221 K/UL — SIGNIFICANT CHANGE UP (ref 150–400)
PMV BLD: 12.8 FL — SIGNIFICANT CHANGE UP (ref 7–13)
POTASSIUM SERPL-MCNC: 3.7 MMOL/L — SIGNIFICANT CHANGE UP (ref 3.5–5.3)
POTASSIUM SERPL-SCNC: 3.7 MMOL/L — SIGNIFICANT CHANGE UP (ref 3.5–5.3)
RBC # BLD: 2.61 M/UL — LOW (ref 4.2–5.8)
RBC # FLD: 16.1 % — HIGH (ref 10.3–14.5)
SODIUM SERPL-SCNC: 140 MMOL/L — SIGNIFICANT CHANGE UP (ref 135–145)
WBC # BLD: 10.06 K/UL — SIGNIFICANT CHANGE UP (ref 3.8–10.5)
WBC # FLD AUTO: 10.06 K/UL — SIGNIFICANT CHANGE UP (ref 3.8–10.5)

## 2017-12-31 PROCEDURE — 99233 SBSQ HOSP IP/OBS HIGH 50: CPT | Mod: GC

## 2017-12-31 RX ORDER — OXYCODONE HYDROCHLORIDE 5 MG/1
10 TABLET ORAL THREE TIMES A DAY
Qty: 0 | Refills: 0 | Status: DISCONTINUED | OUTPATIENT
Start: 2017-12-31 | End: 2018-01-04

## 2017-12-31 RX ADMIN — Medication 500 MILLIGRAM(S): at 06:24

## 2017-12-31 RX ADMIN — Medication 1 TABLET(S): at 09:21

## 2017-12-31 RX ADMIN — Medication 500 MILLIGRAM(S): at 17:46

## 2017-12-31 RX ADMIN — Medication 500 MILLIGRAM(S): at 17:47

## 2017-12-31 RX ADMIN — Medication 650 MILLIGRAM(S): at 16:01

## 2017-12-31 RX ADMIN — BUDESONIDE AND FORMOTEROL FUMARATE DIHYDRATE 2 PUFF(S): 160; 4.5 AEROSOL RESPIRATORY (INHALATION) at 10:29

## 2017-12-31 RX ADMIN — Medication 1 MILLIGRAM(S): at 11:24

## 2017-12-31 RX ADMIN — ALBUTEROL 4 PUFF(S): 90 AEROSOL, METERED ORAL at 16:27

## 2017-12-31 RX ADMIN — PANTOPRAZOLE SODIUM 40 MILLIGRAM(S): 20 TABLET, DELAYED RELEASE ORAL at 11:24

## 2017-12-31 RX ADMIN — ALBUTEROL 4 PUFF(S): 90 AEROSOL, METERED ORAL at 21:45

## 2017-12-31 RX ADMIN — OXYCODONE HYDROCHLORIDE 10 MILLIGRAM(S): 5 TABLET ORAL at 21:09

## 2017-12-31 RX ADMIN — CHLORHEXIDINE GLUCONATE 1 APPLICATION(S): 213 SOLUTION TOPICAL at 11:23

## 2017-12-31 RX ADMIN — OXYCODONE HYDROCHLORIDE 10 MILLIGRAM(S): 5 TABLET ORAL at 22:00

## 2017-12-31 RX ADMIN — Medication 1 TABLET(S): at 17:46

## 2017-12-31 RX ADMIN — Medication 1 TABLET(S): at 21:09

## 2017-12-31 RX ADMIN — Medication 1 TABLET(S): at 11:24

## 2017-12-31 RX ADMIN — Medication 300 MILLIGRAM(S): at 11:24

## 2017-12-31 RX ADMIN — Medication 1 PUFF(S): at 03:46

## 2017-12-31 RX ADMIN — ALBUTEROL 4 PUFF(S): 90 AEROSOL, METERED ORAL at 03:46

## 2017-12-31 RX ADMIN — Medication 0.5 MILLIGRAM(S): at 02:19

## 2017-12-31 RX ADMIN — Medication 500 MILLIGRAM(S): at 11:24

## 2017-12-31 RX ADMIN — Medication 1 PUFF(S): at 21:45

## 2017-12-31 RX ADMIN — ALBUTEROL 4 PUFF(S): 90 AEROSOL, METERED ORAL at 10:27

## 2017-12-31 RX ADMIN — Medication 1 PUFF(S): at 10:28

## 2017-12-31 RX ADMIN — ENOXAPARIN SODIUM 40 MILLIGRAM(S): 100 INJECTION SUBCUTANEOUS at 11:24

## 2017-12-31 RX ADMIN — Medication 1 PUFF(S): at 16:28

## 2017-12-31 RX ADMIN — BUDESONIDE AND FORMOTEROL FUMARATE DIHYDRATE 2 PUFF(S): 160; 4.5 AEROSOL RESPIRATORY (INHALATION) at 21:45

## 2017-12-31 RX ADMIN — Medication 650 MILLIGRAM(S): at 16:31

## 2017-12-31 NOTE — PROGRESS NOTE ADULT - PROBLEM SELECTOR PLAN 2
Skin Open to ostomy bag with thick tan drainage and air  CT surgery eval appreciated  for OR repair next week.

## 2017-12-31 NOTE — CHART NOTE - NSCHARTNOTEFT_GEN_A_CORE
Psychiatry consulted for depression in setting of prolonged hospitalization and multiple medical complications, on Depakene 500 BID, Melatonin 3 QHS, Ativan 0.5 IV q8hrs PRN anxiety, not agitated. CL team to see patient in the morning.

## 2017-12-31 NOTE — PROGRESS NOTE ADULT - SUBJECTIVE AND OBJECTIVE BOX
CHIEF COMPLAINT:    Interval Events:    REVIEW OF SYSTEMS:  Constitutional:   Eyes:  ENT:  CV:  Resp:  GI:  :  MSK:  Integumentary:  Neurological:  Psychiatric:  Endocrine:  Hematologic/Lymphatic:  Allergic/Immunologic:  [ ] All other systems negative  [ ] Unable to assess ROS because ________    OBJECTIVE:  ICU Vital Signs Last 24 Hrs  T(C): 37.1 (31 Dec 2017 06:23), Max: 37.3 (30 Dec 2017 21:47)  T(F): 98.8 (31 Dec 2017 06:23), Max: 99.2 (30 Dec 2017 21:47)  HR: 104 (31 Dec 2017 07:01) (95 - 106)  BP: 132/87 (31 Dec 2017 06:23) (114/77 - 132/89)  BP(mean): --  ABP: --  ABP(mean): --  RR: 17 (31 Dec 2017 06:23) (16 - 17)  SpO2: 100% (31 Dec 2017 07:01) (98% - 100%)    Mode: AC/ CMV (Assist Control/ Continuous Mandatory Ventilation), RR (machine): 12, TV (machine): 450, FiO2: 40, PEEP: 5, ITime: 1, MAP: 11, PIP: 33    12-30 @ 07:01  -  12-31 @ 07:00  --------------------------------------------------------  IN: 150 mL / OUT: 0 mL / NET: 150 mL      CAPILLARY BLOOD GLUCOSE          PHYSICAL EXAM:  General:   HEENT:   Lymph Nodes:  Neck:   Respiratory:   Cardiovascular:   Abdomen:   Extremities:   Skin:   Neurological:  Psychiatry:    HOSPITAL MEDICATIONS:  MEDICATIONS  (STANDING):  ALBUTerol    90 MICROgram(s) HFA Inhaler 4 Puff(s) Inhalation every 6 hours  buDESOnide 160 MICROgram(s)/formoterol 4.5 MICROgram(s) Inhaler 2 Puff(s) Inhalation two times a day  chlorhexidine 4% Liquid 1 Application(s) Topical daily  enoxaparin Injectable 40 milliGRAM(s) SubCutaneous daily  fentaNYL   Patch  50 MICROgram(s)/Hr 1 Patch Transdermal every 72 hours  ferrous    sulfate Liquid 300 milliGRAM(s) Enteral Tube daily  folic acid 1 milliGRAM(s) Oral daily  ipratropium 17 MICROgram(s) HFA Inhaler 1 Puff(s) Inhalation every 6 hours  lactobacillus acidophilus 1 Tablet(s) Oral four times a day with meals  melatonin 3 milliGRAM(s) Oral at bedtime  pantoprazole  Injectable 40 milliGRAM(s) IV Push daily  valproic  acid Syrup 500 milliGRAM(s) Oral every 12 hours  vancomycin    Solution 500 milliGRAM(s) Oral every 6 hours    MEDICATIONS  (PRN):  acetaminophen    Suspension. 650 milliGRAM(s) Enteral Tube every 6 hours PRN Mild Pain to moderate (1 - 6)  LORazepam   Injectable 0.5 milliGRAM(s) IV Push every 8 hours PRN Anxiety      LABS:                        7.0    9.53  )-----------( 211      ( 30 Dec 2017 06:17 )             22.0     12-30    138  |  90<L>  |  8   ----------------------------<  105<H>  3.6   |  42<H>  |  0.60    Ca    8.7      30 Dec 2017 06:17                MICROBIOLOGY:     RADIOLOGY:  [ ] Reviewed and interpreted by me    PULMONARY FUNCTION TESTS:    EKG: CHIEF COMPLAINT: Patient is a 47y old  Male who presents with a chief complaint of Patient is a 47y old  Male who presents with a chief complaint of hemoptysis (09 Nov 2017 00:32) (09 Dec 2017 16:29)    Interval Events: c/o pain at fistula site overnight    REVIEW OF SYSTEMS:  Constitutional: Very frustrated. c/o discomfort and inability to get better  Eyes: PERRLA  ENT: WNL  CV: Denies chest pain except at fistula site  Resp: c/o intermittent SOB  GI: c/o continued loose stool  : WNL  MSK: WNL  Integumentary:  Neurological:  Psychiatric: Frustrated  Endocrine:  Hematologic/Lymphatic:  Allergic/Immunologic:  [x] All other systems negative  [ ] Unable to assess ROS because ________    OBJECTIVE:  ICU Vital Signs Last 24 Hrs  T(C): 37.1 (31 Dec 2017 06:23), Max: 37.3 (30 Dec 2017 21:47)  T(F): 98.8 (31 Dec 2017 06:23), Max: 99.2 (30 Dec 2017 21:47)  HR: 104 (31 Dec 2017 07:01) (95 - 106)  BP: 132/87 (31 Dec 2017 06:23) (114/77 - 132/89)  BP(mean): --  ABP: --  ABP(mean): --  RR: 17 (31 Dec 2017 06:23) (16 - 17)  SpO2: 100% (31 Dec 2017 07:01) (98% - 100%)    Mode: AC/ CMV (Assist Control/ Continuous Mandatory Ventilation), RR (machine): 12, TV (machine): 450, FiO2: 40, PEEP: 5, ITime: 1, MAP: 11, PIP: 33    12-30 @ 07:01  -  12-31 @ 07:00  --------------------------------------------------------  IN: 150 mL / OUT: 0 mL / NET: 150 mL      CAPILLARY BLOOD GLUCOSE          HOSPITAL MEDICATIONS:  MEDICATIONS  (STANDING):  ALBUTerol    90 MICROgram(s) HFA Inhaler 4 Puff(s) Inhalation every 6 hours  buDESOnide 160 MICROgram(s)/formoterol 4.5 MICROgram(s) Inhaler 2 Puff(s) Inhalation two times a day  chlorhexidine 4% Liquid 1 Application(s) Topical daily  enoxaparin Injectable 40 milliGRAM(s) SubCutaneous daily  fentaNYL   Patch  50 MICROgram(s)/Hr 1 Patch Transdermal every 72 hours  ferrous    sulfate Liquid 300 milliGRAM(s) Enteral Tube daily  folic acid 1 milliGRAM(s) Oral daily  ipratropium 17 MICROgram(s) HFA Inhaler 1 Puff(s) Inhalation every 6 hours  lactobacillus acidophilus 1 Tablet(s) Oral four times a day with meals  melatonin 3 milliGRAM(s) Oral at bedtime  pantoprazole  Injectable 40 milliGRAM(s) IV Push daily  valproic  acid Syrup 500 milliGRAM(s) Oral every 12 hours  vancomycin    Solution 500 milliGRAM(s) Oral every 6 hours    MEDICATIONS  (PRN):  acetaminophen    Suspension. 650 milliGRAM(s) Enteral Tube every 6 hours PRN Mild Pain to moderate (1 - 6)  LORazepam   Injectable 0.5 milliGRAM(s) IV Push every 8 hours PRN Anxiety      LABS:                        7.0    9.53  )-----------( 211      ( 30 Dec 2017 06:17 )             22.0     12-30    138  |  90<L>  |  8   ----------------------------<  105<H>  3.6   |  42<H>  |  0.60    Ca    8.7      30 Dec 2017 06:17                MICROBIOLOGY:     RADIOLOGY:  [ ] Reviewed and interpreted by me    PULMONARY FUNCTION TESTS:    EKG:

## 2018-01-01 PROCEDURE — 99233 SBSQ HOSP IP/OBS HIGH 50: CPT | Mod: GC

## 2018-01-01 RX ORDER — CALAMINE 8% AND ZINC OXIDE 8% 160 MG/ML
1 LOTION TOPICAL THREE TIMES A DAY
Qty: 0 | Refills: 0 | Status: DISCONTINUED | OUTPATIENT
Start: 2018-01-01 | End: 2018-01-10

## 2018-01-01 RX ADMIN — Medication 1 TABLET(S): at 17:18

## 2018-01-01 RX ADMIN — ALBUTEROL 4 PUFF(S): 90 AEROSOL, METERED ORAL at 15:29

## 2018-01-01 RX ADMIN — Medication 500 MILLIGRAM(S): at 23:06

## 2018-01-01 RX ADMIN — Medication 1 TABLET(S): at 21:12

## 2018-01-01 RX ADMIN — Medication 1 MILLIGRAM(S): at 11:58

## 2018-01-01 RX ADMIN — Medication 3 MILLIGRAM(S): at 22:47

## 2018-01-01 RX ADMIN — Medication 1 PUFF(S): at 15:29

## 2018-01-01 RX ADMIN — Medication 1 PUFF(S): at 03:51

## 2018-01-01 RX ADMIN — OXYCODONE HYDROCHLORIDE 10 MILLIGRAM(S): 5 TABLET ORAL at 21:50

## 2018-01-01 RX ADMIN — Medication 1 PUFF(S): at 09:29

## 2018-01-01 RX ADMIN — Medication 500 MILLIGRAM(S): at 05:36

## 2018-01-01 RX ADMIN — Medication 500 MILLIGRAM(S): at 11:59

## 2018-01-01 RX ADMIN — ALBUTEROL 4 PUFF(S): 90 AEROSOL, METERED ORAL at 22:13

## 2018-01-01 RX ADMIN — BUDESONIDE AND FORMOTEROL FUMARATE DIHYDRATE 2 PUFF(S): 160; 4.5 AEROSOL RESPIRATORY (INHALATION) at 09:29

## 2018-01-01 RX ADMIN — Medication 500 MILLIGRAM(S): at 17:18

## 2018-01-01 RX ADMIN — OXYCODONE HYDROCHLORIDE 10 MILLIGRAM(S): 5 TABLET ORAL at 21:10

## 2018-01-01 RX ADMIN — Medication 0.5 MILLIGRAM(S): at 03:04

## 2018-01-01 RX ADMIN — PANTOPRAZOLE SODIUM 40 MILLIGRAM(S): 20 TABLET, DELAYED RELEASE ORAL at 11:58

## 2018-01-01 RX ADMIN — Medication 500 MILLIGRAM(S): at 17:17

## 2018-01-01 RX ADMIN — ENOXAPARIN SODIUM 40 MILLIGRAM(S): 100 INJECTION SUBCUTANEOUS at 11:58

## 2018-01-01 RX ADMIN — CALAMINE 8% AND ZINC OXIDE 8% 1 APPLICATION(S): 160 LOTION TOPICAL at 23:07

## 2018-01-01 RX ADMIN — Medication 300 MILLIGRAM(S): at 11:58

## 2018-01-01 RX ADMIN — BUDESONIDE AND FORMOTEROL FUMARATE DIHYDRATE 2 PUFF(S): 160; 4.5 AEROSOL RESPIRATORY (INHALATION) at 22:14

## 2018-01-01 RX ADMIN — Medication 1 TABLET(S): at 11:58

## 2018-01-01 RX ADMIN — Medication 1 TABLET(S): at 09:18

## 2018-01-01 RX ADMIN — ALBUTEROL 4 PUFF(S): 90 AEROSOL, METERED ORAL at 09:29

## 2018-01-01 RX ADMIN — Medication 500 MILLIGRAM(S): at 00:36

## 2018-01-01 RX ADMIN — CHLORHEXIDINE GLUCONATE 1 APPLICATION(S): 213 SOLUTION TOPICAL at 11:44

## 2018-01-01 RX ADMIN — Medication 1 PUFF(S): at 22:14

## 2018-01-01 RX ADMIN — ALBUTEROL 4 PUFF(S): 90 AEROSOL, METERED ORAL at 03:51

## 2018-01-01 NOTE — PROGRESS NOTE ADULT - ATTENDING COMMENTS
pt seen and examined Stable hemodynamic and resp status  BPF with extension through chest wall  awaiting thoracic surgery f/u decision on OR  f/u by interventional bronchoscopy? CT BRONCHOGRAPHY  still with loose stool, but overall improving

## 2018-01-01 NOTE — PROGRESS NOTE ADULT - PROBLEM SELECTOR PLAN 2
Skin Open to ostomy bag with thick tan drainage and air  CT surgery eval appreciated  for OR repair next week. Skin Open to ostomy bag with thick tan drainage and air  CT surgery eval appreciated  for OR repair next week pending consultation between pulmonary team and CT surgery to determine best course of treatment

## 2018-01-01 NOTE — PROGRESS NOTE ADULT - ASSESSMENT
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 Currently on mechanical ventilation. Required trach exchange on 12/15 for cuff leak. Being treated for c.diff as well as for Acinetobacter and Klebsiella in tracheal aspirate. 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 Currently on mechanical ventilation. Required trach exchange on 12/15 for cuff leak. Being treated for c.diff . 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

## 2018-01-01 NOTE — PROGRESS NOTE ADULT - PROBLEM SELECTOR PLAN 7
-Anxious at times  -Appears depressed  -Very frustrated  -Psychiatry consulted -Anxious at times  -Appears depressed  -Very frustrated  - psych to see pt in am  - continues on Ativan

## 2018-01-01 NOTE — PROGRESS NOTE ADULT - SUBJECTIVE AND OBJECTIVE BOX
CHIEF COMPLAINT:    Interval Events:    REVIEW OF SYSTEMS:  Constitutional:   Eyes:  ENT:  CV:  Resp:  GI:  :  MSK:  Integumentary:  Neurological:  Psychiatric:  Endocrine:  Hematologic/Lymphatic:  Allergic/Immunologic:  [ ] All other systems negative  [ ] Unable to assess ROS because ________    OBJECTIVE:  ICU Vital Signs Last 24 Hrs  T(C): 37.2 (01 Jan 2018 05:05), Max: 37.2 (31 Dec 2017 17:42)  T(F): 99 (01 Jan 2018 05:05), Max: 99 (31 Dec 2017 17:42)  HR: 101 (01 Jan 2018 07:04) (86 - 108)  BP: 117/86 (01 Jan 2018 05:05) (115/76 - 137/93)  BP(mean): --  ABP: --  ABP(mean): --  RR: 13 (01 Jan 2018 05:05) (13 - 16)  SpO2: 100% (01 Jan 2018 07:04) (100% - 100%)    Mode: AC/ CMV (Assist Control/ Continuous Mandatory Ventilation), RR (machine): 12, TV (machine): 450, FiO2: 40, PEEP: 5, ITime: 1, MAP: 11.4, PIP: 32    12-31 @ 07:01  -  01-01 @ 07:00  --------------------------------------------------------  IN: 600 mL / OUT: 0 mL / NET: 600 mL      CAPILLARY BLOOD GLUCOSE            HOSPITAL MEDICATIONS:  MEDICATIONS  (STANDING):  ALBUTerol    90 MICROgram(s) HFA Inhaler 4 Puff(s) Inhalation every 6 hours  buDESOnide 160 MICROgram(s)/formoterol 4.5 MICROgram(s) Inhaler 2 Puff(s) Inhalation two times a day  chlorhexidine 4% Liquid 1 Application(s) Topical daily  enoxaparin Injectable 40 milliGRAM(s) SubCutaneous daily  fentaNYL   Patch  50 MICROgram(s)/Hr 1 Patch Transdermal every 72 hours  ferrous    sulfate Liquid 300 milliGRAM(s) Enteral Tube daily  folic acid 1 milliGRAM(s) Oral daily  ipratropium 17 MICROgram(s) HFA Inhaler 1 Puff(s) Inhalation every 6 hours  lactobacillus acidophilus 1 Tablet(s) Oral four times a day with meals  melatonin 3 milliGRAM(s) Oral at bedtime  pantoprazole  Injectable 40 milliGRAM(s) IV Push daily  valproic  acid Syrup 500 milliGRAM(s) Oral every 12 hours  vancomycin    Solution 500 milliGRAM(s) Oral every 6 hours    MEDICATIONS  (PRN):  acetaminophen    Suspension. 650 milliGRAM(s) Enteral Tube every 6 hours PRN Mild Pain to moderate (1 - 6)  LORazepam   Injectable 0.5 milliGRAM(s) IV Push every 8 hours PRN Anxiety  oxyCODONE    IR 10 milliGRAM(s) Oral three times a day PRN Severe Pain (7 - 10)      LABS:                        7.5    10.06 )-----------( 221      ( 31 Dec 2017 09:10 )             24.9     12-31    140  |  91<L>  |  7   ----------------------------<  107<H>  3.7   |  41<H>  |  0.57    Ca    9.1      31 Dec 2017 09:10                MICROBIOLOGY:     RADIOLOGY:  [ ] Reviewed and interpreted by me    PULMONARY FUNCTION TESTS:    EKG: CHIEF COMPLAINT: more anxious today, feels depressed    Interval Events: medically stable overnigth    REVIEW OF SYSTEMS:  CV: denies  Resp: denies  Psychiatric: feels depressed  [x] All other systems negative      OBJECTIVE:  ICU Vital Signs Last 24 Hrs  T(C): 37.2 (01 Jan 2018 05:05), Max: 37.2 (31 Dec 2017 17:42)  T(F): 99 (01 Jan 2018 05:05), Max: 99 (31 Dec 2017 17:42)  HR: 101 (01 Jan 2018 07:04) (86 - 108)  BP: 117/86 (01 Jan 2018 05:05) (115/76 - 137/93)  RR: 13 (01 Jan 2018 05:05) (13 - 16)  SpO2: 100% (01 Jan 2018 07:04) (100% - 100%)    Mode: AC/ CMV (Assist Control/ Continuous Mandatory Ventilation), RR (machine): 12, TV (machine): 450, FiO2: 40, PEEP: 5, ITime: 1, MAP: 11.4, PIP: 32      HOSPITAL MEDICATIONS:  MEDICATIONS  (STANDING):  ALBUTerol    90 MICROgram(s) HFA Inhaler 4 Puff(s) Inhalation every 6 hours  buDESOnide 160 MICROgram(s)/formoterol 4.5 MICROgram(s) Inhaler 2 Puff(s) Inhalation two times a day  chlorhexidine 4% Liquid 1 Application(s) Topical daily  enoxaparin Injectable 40 milliGRAM(s) SubCutaneous daily  fentaNYL   Patch  50 MICROgram(s)/Hr 1 Patch Transdermal every 72 hours  ferrous    sulfate Liquid 300 milliGRAM(s) Enteral Tube daily  folic acid 1 milliGRAM(s) Oral daily  ipratropium 17 MICROgram(s) HFA Inhaler 1 Puff(s) Inhalation every 6 hours  lactobacillus acidophilus 1 Tablet(s) Oral four times a day with meals  melatonin 3 milliGRAM(s) Oral at bedtime  pantoprazole  Injectable 40 milliGRAM(s) IV Push daily  valproic  acid Syrup 500 milliGRAM(s) Oral every 12 hours  vancomycin    Solution 500 milliGRAM(s) Oral every 6 hours    MEDICATIONS  (PRN):  acetaminophen    Suspension. 650 milliGRAM(s) Enteral Tube every 6 hours PRN Mild Pain to moderate (1 - 6)  LORazepam   Injectable 0.5 milliGRAM(s) IV Push every 8 hours PRN Anxiety  oxyCODONE    IR 10 milliGRAM(s) Oral three times a day PRN Severe Pain (7 - 10)

## 2018-01-02 DIAGNOSIS — F43.20 ADJUSTMENT DISORDER, UNSPECIFIED: ICD-10-CM

## 2018-01-02 LAB
BUN SERPL-MCNC: 6 MG/DL — LOW (ref 7–23)
CALCIUM SERPL-MCNC: 9.1 MG/DL — SIGNIFICANT CHANGE UP (ref 8.4–10.5)
CHLORIDE SERPL-SCNC: 91 MMOL/L — LOW (ref 98–107)
CO2 SERPL-SCNC: 42 MMOL/L — HIGH (ref 22–31)
CREAT SERPL-MCNC: 0.56 MG/DL — SIGNIFICANT CHANGE UP (ref 0.5–1.3)
GLUCOSE SERPL-MCNC: 123 MG/DL — HIGH (ref 70–99)
GRAM STN WND: SIGNIFICANT CHANGE UP
HCT VFR BLD CALC: 25.2 % — LOW (ref 39–50)
HGB BLD-MCNC: 7.6 G/DL — LOW (ref 13–17)
MAGNESIUM SERPL-MCNC: 1.7 MG/DL — SIGNIFICANT CHANGE UP (ref 1.6–2.6)
MCHC RBC-ENTMCNC: 28.6 PG — SIGNIFICANT CHANGE UP (ref 27–34)
MCHC RBC-ENTMCNC: 30.2 % — LOW (ref 32–36)
MCV RBC AUTO: 94.7 FL — SIGNIFICANT CHANGE UP (ref 80–100)
NRBC # FLD: 0 — SIGNIFICANT CHANGE UP
PHOSPHATE SERPL-MCNC: 4.2 MG/DL — SIGNIFICANT CHANGE UP (ref 2.5–4.5)
PLATELET # BLD AUTO: 221 K/UL — SIGNIFICANT CHANGE UP (ref 150–400)
PMV BLD: 13.1 FL — HIGH (ref 7–13)
POTASSIUM SERPL-MCNC: 3.8 MMOL/L — SIGNIFICANT CHANGE UP (ref 3.5–5.3)
POTASSIUM SERPL-SCNC: 3.8 MMOL/L — SIGNIFICANT CHANGE UP (ref 3.5–5.3)
RBC # BLD: 2.66 M/UL — LOW (ref 4.2–5.8)
RBC # FLD: 16.4 % — HIGH (ref 10.3–14.5)
SODIUM SERPL-SCNC: 138 MMOL/L — SIGNIFICANT CHANGE UP (ref 135–145)
SPECIMEN SOURCE: SIGNIFICANT CHANGE UP
WBC # BLD: 7.83 K/UL — SIGNIFICANT CHANGE UP (ref 3.8–10.5)
WBC # FLD AUTO: 7.83 K/UL — SIGNIFICANT CHANGE UP (ref 3.8–10.5)

## 2018-01-02 PROCEDURE — 99233 SBSQ HOSP IP/OBS HIGH 50: CPT | Mod: GC

## 2018-01-02 RX ORDER — HALOPERIDOL DECANOATE 100 MG/ML
1 INJECTION INTRAMUSCULAR EVERY 6 HOURS
Qty: 0 | Refills: 0 | Status: DISCONTINUED | OUTPATIENT
Start: 2018-01-02 | End: 2018-01-03

## 2018-01-02 RX ADMIN — Medication 0.5 MILLIGRAM(S): at 13:07

## 2018-01-02 RX ADMIN — Medication 1 PUFF(S): at 15:58

## 2018-01-02 RX ADMIN — ALBUTEROL 4 PUFF(S): 90 AEROSOL, METERED ORAL at 09:14

## 2018-01-02 RX ADMIN — Medication 1 PUFF(S): at 09:14

## 2018-01-02 RX ADMIN — Medication 3 MILLIGRAM(S): at 21:13

## 2018-01-02 RX ADMIN — BUDESONIDE AND FORMOTEROL FUMARATE DIHYDRATE 2 PUFF(S): 160; 4.5 AEROSOL RESPIRATORY (INHALATION) at 21:13

## 2018-01-02 RX ADMIN — Medication 1 TABLET(S): at 21:13

## 2018-01-02 RX ADMIN — Medication 500 MILLIGRAM(S): at 18:37

## 2018-01-02 RX ADMIN — Medication 500 MILLIGRAM(S): at 07:08

## 2018-01-02 RX ADMIN — FENTANYL CITRATE 1 PATCH: 50 INJECTION INTRAVENOUS at 12:28

## 2018-01-02 RX ADMIN — Medication 1 TABLET(S): at 07:07

## 2018-01-02 RX ADMIN — Medication 500 MILLIGRAM(S): at 12:28

## 2018-01-02 RX ADMIN — ALBUTEROL 4 PUFF(S): 90 AEROSOL, METERED ORAL at 21:13

## 2018-01-02 RX ADMIN — BUDESONIDE AND FORMOTEROL FUMARATE DIHYDRATE 2 PUFF(S): 160; 4.5 AEROSOL RESPIRATORY (INHALATION) at 09:15

## 2018-01-02 RX ADMIN — PANTOPRAZOLE SODIUM 40 MILLIGRAM(S): 20 TABLET, DELAYED RELEASE ORAL at 12:28

## 2018-01-02 RX ADMIN — ENOXAPARIN SODIUM 40 MILLIGRAM(S): 100 INJECTION SUBCUTANEOUS at 12:28

## 2018-01-02 RX ADMIN — Medication 1 TABLET(S): at 18:36

## 2018-01-02 RX ADMIN — Medication 1 MILLIGRAM(S): at 12:29

## 2018-01-02 RX ADMIN — Medication 1 PUFF(S): at 21:13

## 2018-01-02 RX ADMIN — Medication 300 MILLIGRAM(S): at 12:28

## 2018-01-02 RX ADMIN — ALBUTEROL 4 PUFF(S): 90 AEROSOL, METERED ORAL at 03:26

## 2018-01-02 RX ADMIN — Medication 500 MILLIGRAM(S): at 07:07

## 2018-01-02 RX ADMIN — Medication 1 TABLET(S): at 12:28

## 2018-01-02 RX ADMIN — Medication 1 PUFF(S): at 03:26

## 2018-01-02 RX ADMIN — FENTANYL CITRATE 1 PATCH: 50 INJECTION INTRAVENOUS at 11:33

## 2018-01-02 RX ADMIN — ALBUTEROL 4 PUFF(S): 90 AEROSOL, METERED ORAL at 15:58

## 2018-01-02 NOTE — PROGRESS NOTE ADULT - ASSESSMENT
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 Currently on mechanical ventilation. Required trach exchange on 12/15 for cuff leak. Being treated for c.diff . 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

## 2018-01-02 NOTE — BEHAVIORAL HEALTH ASSESSMENT NOTE - HPI (INCLUDE ILLNESS QUALITY, SEVERITY, DURATION, TIMING, CONTEXT, MODIFYING FACTORS, ASSOCIATED SIGNS AND SYMPTOMS)
Pt is a 48yo Male, single, unemployed, domiciled alone, with no significant past psychiatric history, no past psychiatric hospitalizations, no past suicide attempts, no substance use/abuse, PMH PFO, CVA, sarcoidosis w/ hemoptysis s/p DAVID resection 9/2017, 11/8 to [facility]ICU for recurrent  hemoptysis  and acute hypoxic respiratory failure, S/P IR embolization of Left bronchial artery on 11/16, c/b difficult ventilation weaning now w/ tracheostomy (11/24/2017) and PEG. Currently on mechanical ventilation. Required trach exchange on 12/15 for cuff leak. Being treated for c.diff. 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  Pt received Ativan 0.5mg IV PRN at least once a day x7 days.  Psych consulted for depression and anxiety.  Note, pt with extended hospital course at [facility] 9/11-11/2, discharged to rehab 11/2-11/8, returned 11/8 for hemoptysis.      Seen and examined at bedside.  Interview limited by difficulty communicating.  Pt unable to vocalize responses.  Pt's girlfriend at bedside assisted with reading pt's lips.  Pt AAOx4.  States he is "angry".  States he has been here "forever".  He reports frustration that he is sleeping too much and does not want more medication that could sedate him.  Discussed options for medication and potential benefits with pt and girlfriend.  He denies SI/HI.  Denies AH/VH or psychotic symptoms.      Per pt's girlfriend, pt is frustrated with extended hospitalization.  States pt is angry that team does not have a clear plan regarding new fistula.  States pt wants to move forward, start ambulating.  She also reports concerns that pt is sleeping too much during the day and not sleeping overnight.  She denies mood symptoms prior to extended hospital course.  Denies past psychiatric history, treatment, hospitalization, suicide attempt, or substance use.

## 2018-01-02 NOTE — PROGRESS NOTE ADULT - PROBLEM SELECTOR PLAN 2
Skin Open to ostomy bag with thick tan drainage and air  CT surgery eval appreciated  for OR repair next week pending consultation between pulmonary team and CT surgery to determine best course of treatment

## 2018-01-02 NOTE — BEHAVIORAL HEALTH ASSESSMENT NOTE - CASE SUMMARY
Pt is a 46yo Male, single, unemployed, domiciled alone, with no significant past psychiatric history, no past psychiatric hospitalizations, no past suicide attempts, no substance use/abuse, PMH PFO, CVA, sarcoidosis w/ hemoptysis s/p DAVID resection 9/2017, 11/8 to CTICU for recurrent  hemoptysis  and acute hypoxic respiratory failure, S/P IR embolization of Left bronchial artery on 11/16, c/b difficult ventilation weaning now w/ tracheostomy (11/24/2017) and PEG. Currently on mechanical ventilation. Required trach exchange on 12/15 for cuff leak. Being treated for c.diff. 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  Pt received Ativan 0.5mg IV PRN at least once a day x7 days.  Psych consulted for depression and anxiety. Pt. seen and evaluated, was irritable to interview, answered most of the questions by nodding his head and by lip movement. He denies SI and HI, denies AH and VH. Met with pt.'s girl friend present at bedside, she stated that pt. is very frustrated being in the hospital and denies any safety concerns. Recommend meds. as written above. Will follow

## 2018-01-02 NOTE — PROGRESS NOTE ADULT - PROBLEM SELECTOR PLAN 6
- Vanco via PEG increased to 500mg on 12/19 to end 1/2  - contact precautions   - stools improving  - changed feeds and added lactobacillus  - consider GI eval if feeds are not an issue  - monitor stools

## 2018-01-02 NOTE — PROGRESS NOTE ADULT - ATTENDING COMMENTS
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 Closure of fistula will place the space under tension and may not be optimal solution.  Will discuss with CT surgery.  Patient with severe depression - obtain psychiatry evaluation.

## 2018-01-02 NOTE — BEHAVIORAL HEALTH ASSESSMENT NOTE - SUMMARY
Pt is a 46yo Male, single, unemployed, domiciled alone, with no significant past psychiatric history, no past psychiatric hospitalizations, no past suicide attempts, no substance use/abuse, PMH PFO, CVA, sarcoidosis w/ hemoptysis s/p DAVID resection 9/2017, 11/8 to [facility]ICU for recurrent  hemoptysis  and acute hypoxic respiratory failure, S/P IR embolization of Left bronchial artery on 11/16, c/b difficult ventilation weaning now w/ tracheostomy (11/24/2017) and PEG. Currently on mechanical ventilation. Required trach exchange on 12/15 for cuff leak. Being treated for c.diff. 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  Pt received Ativan 0.5mg IV PRN at least once a day x7 days.  Psych consulted for depression and anxiety.  Note, pt with extended hospital course at [facility] 9/11-11/2, discharged to rehab 11/2-11/8, returned 11/8 for hemoptysis.      On exam, pt AAOx4, guarded with limited participation in interview.  Pt is "angry" in the context of extended hospitalization and medical issues c/w adjustment disorder.  Pt denies SI/HI/AH/VH or psychotic symptoms.  He may benefit from Klonopin for anxiety and/or Trazodone for insomnia.  However, pt refuses medications at this time due to concern of oversedation.  C/w Ativan 0.5mg IV q8h PRN anxiety.  F/U EKG (last 12/19), valproic acid level (for seizure).  Will follow.

## 2018-01-02 NOTE — BEHAVIORAL HEALTH ASSESSMENT NOTE - AXIS III
Febrile, Bronchopleural fistula, Respiratory failure, COPD , Sarcoidosis, Clostridium difficile diarrhea

## 2018-01-02 NOTE — PROGRESS NOTE ADULT - SUBJECTIVE AND OBJECTIVE BOX
CHIEF COMPLAINT:Patient is a 47y old  Male who presents with a chief complaint of Patient is a 47y old  Male who presents with a chief complaint of hemoptysis (09 Nov 2017 00:32) (09 Dec 2017 16:29)      Interval Events:    REVIEW OF SYSTEMS:  Constitutional:   Eyes:  ENT:  CV:  Resp:  GI:  :  MSK:  Integumentary:  Neurological:  Psychiatric:  Endocrine:  Hematologic/Lymphatic:  Allergic/Immunologic:  [ ] All other systems negative  [ ] Unable to assess ROS because ________    OBJECTIVE:  ICU Vital Signs Last 24 Hrs  T(C): 37.1 (02 Jan 2018 07:05), Max: 37.1 (01 Jan 2018 09:17)  T(F): 98.7 (02 Jan 2018 07:05), Max: 98.8 (01 Jan 2018 09:17)  HR: 101 (02 Jan 2018 07:05) (94 - 106)  BP: 117/79 (02 Jan 2018 07:05) (110/71 - 129/99)  BP(mean): --  ABP: --  ABP(mean): --  RR: 14 (02 Jan 2018 07:05) (13 - 18)  SpO2: 100% (02 Jan 2018 07:05) (100% - 100%)    Mode: AC/ CMV (Assist Control/ Continuous Mandatory Ventilation), RR (machine): 12, TV (machine): 450, FiO2: 40, PEEP: 5, MAP: 11.5, PIP: 34    01-01 @ 07:01  -  01-02 @ 07:00  --------------------------------------------------------  IN: 300 mL / OUT: 0 mL / NET: 300 mL      CAPILLARY BLOOD GLUCOSE          PHYSICAL EXAM:  General:   HEENT:   Lymph Nodes:  Neck:   Respiratory:   Cardiovascular:   Abdomen:   Extremities:   Skin:   Neurological:  Psychiatry:    HOSPITAL MEDICATIONS:  MEDICATIONS  (STANDING):  ALBUTerol    90 MICROgram(s) HFA Inhaler 4 Puff(s) Inhalation every 6 hours  buDESOnide 160 MICROgram(s)/formoterol 4.5 MICROgram(s) Inhaler 2 Puff(s) Inhalation two times a day  chlorhexidine 4% Liquid 1 Application(s) Topical daily  enoxaparin Injectable 40 milliGRAM(s) SubCutaneous daily  fentaNYL   Patch  50 MICROgram(s)/Hr 1 Patch Transdermal every 72 hours  ferrous    sulfate Liquid 300 milliGRAM(s) Enteral Tube daily  folic acid 1 milliGRAM(s) Oral daily  ipratropium 17 MICROgram(s) HFA Inhaler 1 Puff(s) Inhalation every 6 hours  lactobacillus acidophilus 1 Tablet(s) Oral four times a day with meals  melatonin 3 milliGRAM(s) Oral at bedtime  pantoprazole  Injectable 40 milliGRAM(s) IV Push daily  valproic  acid Syrup 500 milliGRAM(s) Oral every 12 hours  vancomycin    Solution 500 milliGRAM(s) Oral every 6 hours    MEDICATIONS  (PRN):  acetaminophen    Suspension. 650 milliGRAM(s) Enteral Tube every 6 hours PRN Mild Pain to moderate (1 - 6)  calamine Lotion 1 Application(s) Topical three times a day PRN Itching  LORazepam   Injectable 0.5 milliGRAM(s) IV Push every 8 hours PRN Anxiety  oxyCODONE    IR 10 milliGRAM(s) Oral three times a day PRN Severe Pain (7 - 10)      LABS:                        7.5    10.06 )-----------( 221      ( 31 Dec 2017 09:10 )             24.9     12-31    140  |  91<L>  |  7   ----------------------------<  107<H>  3.7   |  41<H>  |  0.57    Ca    9.1      31 Dec 2017 09:10                MICROBIOLOGY:     RADIOLOGY:  [ ] Reviewed and interpreted by me    PULMONARY FUNCTION TESTS:    EKG: CHIEF COMPLAINT:Patient is a 47y old  Male who presents with a chief complaint of Patient is a 47y old  Male who presents with a chief complaint of hemoptysis (09 Nov 2017 00:32) (09 Dec 2017 16:29)      Interval Events: no events overnight, remains afebrile     REVIEW OF SYSTEMS:  Constitutional:   Eyes:  ENT:  CV: denies  Resp: denies  GI:  :  MSK:  Integumentary:  Neurological:  Psychiatric: angry  Endocrine:  Hematologic/Lymphatic:  Allergic/Immunologic:  [X ] All other systems negative  [ ] Unable to assess ROS because ________    OBJECTIVE:  ICU Vital Signs Last 24 Hrs  T(C): 37.1 (02 Jan 2018 07:05), Max: 37.1 (01 Jan 2018 09:17)  T(F): 98.7 (02 Jan 2018 07:05), Max: 98.8 (01 Jan 2018 09:17)  HR: 101 (02 Jan 2018 07:05) (94 - 106)  BP: 117/79 (02 Jan 2018 07:05) (110/71 - 129/99)  BP(mean): --  ABP: --  ABP(mean): --  RR: 14 (02 Jan 2018 07:05) (13 - 18)  SpO2: 100% (02 Jan 2018 07:05) (100% - 100%)    Mode: AC/ CMV (Assist Control/ Continuous Mandatory Ventilation), RR (machine): 12, TV (machine): 450, FiO2: 40, PEEP: 5, MAP: 11.5, PIP: 34    01-01 @ 07:01  -  01-02 @ 07:00  --------------------------------------------------------  IN: 300 mL / OUT: 0 mL / NET: 300 mL      HOSPITAL MEDICATIONS:  MEDICATIONS  (STANDING):  ALBUTerol    90 MICROgram(s) HFA Inhaler 4 Puff(s) Inhalation every 6 hours  buDESOnide 160 MICROgram(s)/formoterol 4.5 MICROgram(s) Inhaler 2 Puff(s) Inhalation two times a day  chlorhexidine 4% Liquid 1 Application(s) Topical daily  enoxaparin Injectable 40 milliGRAM(s) SubCutaneous daily  fentaNYL   Patch  50 MICROgram(s)/Hr 1 Patch Transdermal every 72 hours  ferrous    sulfate Liquid 300 milliGRAM(s) Enteral Tube daily  folic acid 1 milliGRAM(s) Oral daily  ipratropium 17 MICROgram(s) HFA Inhaler 1 Puff(s) Inhalation every 6 hours  lactobacillus acidophilus 1 Tablet(s) Oral four times a day with meals  melatonin 3 milliGRAM(s) Oral at bedtime  pantoprazole  Injectable 40 milliGRAM(s) IV Push daily  valproic  acid Syrup 500 milliGRAM(s) Oral every 12 hours  vancomycin    Solution 500 milliGRAM(s) Oral every 6 hours    MEDICATIONS  (PRN):  acetaminophen    Suspension. 650 milliGRAM(s) Enteral Tube every 6 hours PRN Mild Pain to moderate (1 - 6)  calamine Lotion 1 Application(s) Topical three times a day PRN Itching  LORazepam   Injectable 0.5 milliGRAM(s) IV Push every 8 hours PRN Anxiety  oxyCODONE    IR 10 milliGRAM(s) Oral three times a day PRN Severe Pain (7 - 10)      LABS:  Complete Blood Count in AM (01.02.18 @ 08:00)    WBC Count: 7.83 K/uL    RBC Count: 2.66 M/uL    Hemoglobin: 7.6 g/dL    Hematocrit: 25.2 %    Mean Cell Volume: 94.7 fL    Mean Cell Hemoglobin: 28.6 pg    Mean Cell Hemoglobin Conc: 30.2 %    Red Cell Distrib Width: 16.4 %    Platelet Count - Automated: 221 K/uL    MPV: 13.1 fl    Nucleated RBC #: 0    Basic Metabolic Panel w/Mg &amp; Inorg Phos (01.02.18 @ 08:00)    Blood Urea Nitrogen, Serum: 6 mg/dL

## 2018-01-02 NOTE — PROGRESS NOTE ADULT - SUBJECTIVE AND OBJECTIVE BOX
As per Dr. Caldera will plan for Reop and repair of left chest wound herniation next Tuesday Jan 9th.   Dr. Caldera spoke w pt. and pt's girlfriend.   Above d/w primary team NP.

## 2018-01-02 NOTE — BEHAVIORAL HEALTH ASSESSMENT NOTE - NSBHCHARTREVIEWLAB_PSY_A_CORE FT
7.6    7.83  )-----------( 221      ( 02 Jan 2018 08:00 )             25.2   01-02    138  |  91<L>  |  6<L>  ----------------------------<  123<H>  3.8   |  42<H>  |  0.56    Ca    9.1      02 Jan 2018 08:00  Phos  4.2     01-02  Mg     1.7     01-02

## 2018-01-02 NOTE — BEHAVIORAL HEALTH ASSESSMENT NOTE - RISK ASSESSMENT
Risk factors include acute medical issue.  Protective factors include stable domicile, social support, no past psych hosp or SA, no substance use.  Pt denies SI/HI/AH/VH, manic or psychotic symptoms.  Pt does not present an acute risk of harm to self or others.  Pt does not require inpatient psychiatric hospitalization.

## 2018-01-02 NOTE — BEHAVIORAL HEALTH ASSESSMENT NOTE - NSBHCONSULTFOLLOWAFTERCARE_PSY_A_CORE FT
Pt can receive psychiatric f/u at rehab facility.    Other referrals:  [facility] [phone], Monday to Friday, 9am-7pm, walk-in

## 2018-01-02 NOTE — PROGRESS NOTE ADULT - PROBLEM SELECTOR PLAN 7
-Anxious at times  -Appears depressed  -Very frustrated  - psych to see pt in am  - continues on Ativan

## 2018-01-02 NOTE — BEHAVIORAL HEALTH ASSESSMENT NOTE - NSBHCHARTREVIEWVS_PSY_A_CORE FT
Vital Signs Last 24 Hrs  T(C): 36.9 (02 Jan 2018 11:00), Max: 37.1 (01 Jan 2018 21:06)  T(F): 98.5 (02 Jan 2018 11:00), Max: 98.8 (01 Jan 2018 21:06)  HR: 102 (02 Jan 2018 15:58) (94 - 106)  BP: 120/82 (02 Jan 2018 11:00) (112/81 - 127/91)  BP(mean): --  RR: 16 (02 Jan 2018 11:00) (13 - 16)  SpO2: 100% (02 Jan 2018 15:58) (100% - 100%)

## 2018-01-03 DIAGNOSIS — F43.22 ADJUSTMENT DISORDER WITH ANXIETY: ICD-10-CM

## 2018-01-03 LAB — VALPROATE SERPL-MCNC: 41.8 UG/ML — LOW (ref 50–100)

## 2018-01-03 PROCEDURE — 90792 PSYCH DIAG EVAL W/MED SRVCS: CPT

## 2018-01-03 PROCEDURE — 99233 SBSQ HOSP IP/OBS HIGH 50: CPT | Mod: GC

## 2018-01-03 RX ADMIN — ALBUTEROL 4 PUFF(S): 90 AEROSOL, METERED ORAL at 10:08

## 2018-01-03 RX ADMIN — Medication 1 TABLET(S): at 21:55

## 2018-01-03 RX ADMIN — ALBUTEROL 4 PUFF(S): 90 AEROSOL, METERED ORAL at 21:11

## 2018-01-03 RX ADMIN — Medication 1 TABLET(S): at 18:00

## 2018-01-03 RX ADMIN — CHLORHEXIDINE GLUCONATE 1 APPLICATION(S): 213 SOLUTION TOPICAL at 09:00

## 2018-01-03 RX ADMIN — Medication 0.5 MILLIGRAM(S): at 23:36

## 2018-01-03 RX ADMIN — Medication 0.5 MILLIGRAM(S): at 00:10

## 2018-01-03 RX ADMIN — Medication 500 MILLIGRAM(S): at 06:19

## 2018-01-03 RX ADMIN — Medication 1 MILLIGRAM(S): at 14:10

## 2018-01-03 RX ADMIN — Medication 1 PUFF(S): at 03:33

## 2018-01-03 RX ADMIN — BUDESONIDE AND FORMOTEROL FUMARATE DIHYDRATE 2 PUFF(S): 160; 4.5 AEROSOL RESPIRATORY (INHALATION) at 10:08

## 2018-01-03 RX ADMIN — ENOXAPARIN SODIUM 40 MILLIGRAM(S): 100 INJECTION SUBCUTANEOUS at 12:00

## 2018-01-03 RX ADMIN — Medication 1 TABLET(S): at 14:10

## 2018-01-03 RX ADMIN — BUDESONIDE AND FORMOTEROL FUMARATE DIHYDRATE 2 PUFF(S): 160; 4.5 AEROSOL RESPIRATORY (INHALATION) at 21:11

## 2018-01-03 RX ADMIN — ALBUTEROL 4 PUFF(S): 90 AEROSOL, METERED ORAL at 15:32

## 2018-01-03 RX ADMIN — Medication 1 TABLET(S): at 08:55

## 2018-01-03 RX ADMIN — Medication 1 PUFF(S): at 15:32

## 2018-01-03 RX ADMIN — Medication 1 PUFF(S): at 21:11

## 2018-01-03 RX ADMIN — ALBUTEROL 4 PUFF(S): 90 AEROSOL, METERED ORAL at 03:33

## 2018-01-03 RX ADMIN — PANTOPRAZOLE SODIUM 40 MILLIGRAM(S): 20 TABLET, DELAYED RELEASE ORAL at 12:00

## 2018-01-03 RX ADMIN — Medication 1 PUFF(S): at 10:08

## 2018-01-03 RX ADMIN — Medication 300 MILLIGRAM(S): at 14:10

## 2018-01-03 NOTE — PROGRESS NOTE BEHAVIORAL HEALTH - NSBHCONSULTFOLLOWAFTERCARE_PSY_A_CORE FT
Pt can receive psychiatric f/u at rehab facility.    Other referrals:  [facility] [phone], Monday to Friday, 9am-7pm, walk-in

## 2018-01-03 NOTE — PROGRESS NOTE ADULT - ASSESSMENT
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 Currently on mechanical ventilation. Required trach exchange on 12/15 for cuff leak. Treated for c.diff . 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

## 2018-01-03 NOTE — PROGRESS NOTE ADULT - MENTAL STATUS
Decreased strength on the left. No acute change.
Opens eyes spontaneously. Nods head appropriately to simple questions
alert, follows commands, answers appropriately
Opens eyes to voice. Nods head to simple questions.
Opens eyes. Able to make needs known. Follows simple commands.
lethargic , opens eyes to verbal stimuli follows simple commands
AOx3
awake and alert

## 2018-01-03 NOTE — PROGRESS NOTE BEHAVIORAL HEALTH - CASE SUMMARY
Pt is a 46yo Male, single, unemployed, domiciled alone, with no significant past psychiatric history, no past psychiatric hospitalizations, no past suicide attempts, no substance use/abuse, PMH PFO, CVA, sarcoidosis w/ hemoptysis s/p DAVID resection 9/2017, 11/8 to [facility]ICU for recurrent  hemoptysis  and acute hypoxic respiratory failure, S/P IR embolization of Left bronchial artery on 11/16, c/b difficult ventilation weaning now w/ tracheostomy (11/24/2017) and PEG. Currently on mechanical ventilation. Required trach exchange on 12/15 for cuff leak. Being treated for c.diff. 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  Pt received Ativan 0.5mg IV PRN at least once a day x7 days.  Psych consulted for depression and anxiety. Pt. seen and evaluated, was irritable to interview, answered most of the questions by nodding his head and by lip movement. He denies SI and HI, denies AH and VH. Met with pt.'s girl frined present at bedside, she stated that pt. is very frustrated being in the hospital and denies any safety concerns. Recommend meds. as written above. Will follow

## 2018-01-03 NOTE — PROGRESS NOTE BEHAVIORAL HEALTH - NSBHCHARTREVIEWVS_PSY_A_CORE FT
Vital Signs Last 24 Hrs  T(C): 36.9 (03 Jan 2018 09:22), Max: 37.2 (02 Jan 2018 17:00)  T(F): 98.4 (03 Jan 2018 09:22), Max: 98.9 (02 Jan 2018 17:00)  HR: 111 (03 Jan 2018 11:30) (99 - 119)  BP: 107/74 (03 Jan 2018 09:22) (107/74 - 132/89)  BP(mean): --  RR: 12 (03 Jan 2018 09:22) (12 - 17)  SpO2: 100% (03 Jan 2018 11:30) (99% - 100%)

## 2018-01-03 NOTE — PROGRESS NOTE BEHAVIORAL HEALTH - SUMMARY
Pt is a 46yo Male, single, unemployed, domiciled alone, with no significant past psychiatric history, no past psychiatric hospitalizations, no past suicide attempts, no substance use/abuse, PMH PFO, CVA, sarcoidosis w/ hemoptysis s/p DAVID resection 9/2017, 11/8 to [facility]ICU for recurrent  hemoptysis  and acute hypoxic respiratory failure, S/P IR embolization of Left bronchial artery on 11/16, c/b difficult ventilation weaning now w/ tracheostomy (11/24/2017) and PEG. Currently on mechanical ventilation. Required trach exchange on 12/15 for cuff leak. Being treated for c.diff. 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  Pt received Ativan 0.5mg IV PRN at least once a day x7 days.  Psych consulted for depression and anxiety.  Note, pt with extended hospital course at [facility] 9/11-11/2, discharged to rehab 11/2-11/8, returned 11/8 for hemoptysis.      On exam, pt refusing to speak with treatment team but nods or shakes head in response to questions. Pt denies SI/HI/AH/VH or psychotic symptoms. Per team, pt is very verbally hostile towards his family members and at times towards nursing staff. Girlfriend states that pt has no psychiatric history and is not a safety concern.

## 2018-01-03 NOTE — PROGRESS NOTE ADULT - SUBJECTIVE AND OBJECTIVE BOX
CHIEF COMPLAINT:    Interval Events:    REVIEW OF SYSTEMS:  Constitutional:   Eyes:  ENT:  CV:  Resp:  GI:  :  MSK:  Integumentary:  Neurological:  Psychiatric:  Endocrine:  Hematologic/Lymphatic:  Allergic/Immunologic:  [ ] All other systems negative  [ ] Unable to assess ROS because ________    OBJECTIVE:  ICU Vital Signs Last 24 Hrs  T(C): 36.8 (03 Jan 2018 05:00), Max: 37.2 (02 Jan 2018 17:00)  T(F): 98.3 (03 Jan 2018 05:00), Max: 98.9 (02 Jan 2018 17:00)  HR: 100 (03 Jan 2018 05:00) (100 - 110)  BP: 128/93 (03 Jan 2018 05:00) (119/77 - 132/89)  BP(mean): --  ABP: --  ABP(mean): --  RR: 17 (03 Jan 2018 05:00) (12 - 17)  SpO2: 100% (03 Jan 2018 05:00) (99% - 100%)    Mode: AC/ CMV (Assist Control/ Continuous Mandatory Ventilation), RR (machine): 12, TV (machine): 500, FiO2: 40, PEEP: 5, MAP: 11.8, PIP: 40    01-02 @ 07:01  -  01-03 @ 07:00  --------------------------------------------------------  IN: 1425 mL / OUT: 1200 mL / NET: 225 mL      CAPILLARY BLOOD GLUCOSE          PHYSICAL EXAM:  General:   HEENT:   Lymph Nodes:  Neck:   Respiratory:   Cardiovascular:   Abdomen:   Extremities:   Skin:   Neurological:  Psychiatry:    HOSPITAL MEDICATIONS:  MEDICATIONS  (STANDING):  ALBUTerol    90 MICROgram(s) HFA Inhaler 4 Puff(s) Inhalation every 6 hours  buDESOnide 160 MICROgram(s)/formoterol 4.5 MICROgram(s) Inhaler 2 Puff(s) Inhalation two times a day  chlorhexidine 4% Liquid 1 Application(s) Topical daily  enoxaparin Injectable 40 milliGRAM(s) SubCutaneous daily  ferrous    sulfate Liquid 300 milliGRAM(s) Enteral Tube daily  folic acid 1 milliGRAM(s) Oral daily  ipratropium 17 MICROgram(s) HFA Inhaler 1 Puff(s) Inhalation every 6 hours  lactobacillus acidophilus 1 Tablet(s) Oral four times a day with meals  melatonin 3 milliGRAM(s) Oral at bedtime  pantoprazole  Injectable 40 milliGRAM(s) IV Push daily  valproic  acid Syrup 500 milliGRAM(s) Oral every 12 hours    MEDICATIONS  (PRN):  acetaminophen    Suspension. 650 milliGRAM(s) Enteral Tube every 6 hours PRN Mild Pain to moderate (1 - 6)  calamine Lotion 1 Application(s) Topical three times a day PRN Itching  haloperidol    Injectable 1 milliGRAM(s) IV Push every 6 hours PRN Agitation  haloperidol    Injectable 1 milliGRAM(s) IntraMuscular every 6 hours PRN Agitation  LORazepam   Injectable 0.5 milliGRAM(s) IV Push every 8 hours PRN Anxiety  oxyCODONE    IR 10 milliGRAM(s) Oral three times a day PRN Severe Pain (7 - 10)      LABS:                        7.6    7.83  )-----------( 221      ( 02 Jan 2018 08:00 )             25.2     01-02    138  |  91<L>  |  6<L>  ----------------------------<  123<H>  3.8   |  42<H>  |  0.56    Ca    9.1      02 Jan 2018 08:00  Phos  4.2     01-02  Mg     1.7     01-02                MICROBIOLOGY:     RADIOLOGY:  [ ] Reviewed and interpreted by me    PULMONARY FUNCTION TESTS:    EKG: CHIEF COMPLAINT:Patient is a 47y old  Male who presents with a chief complaint of hemoptysis (09 Nov 2017 00:32) (09 Dec 2017 16:29)      Interval Events:  no events, remains afebrile    REVIEW OF SYSTEMS:  Constitutional:   Eyes:  ENT:  CV: denies  Resp: denies  GI:  :  MSK:  Integumentary:  Neurological:  Psychiatric:  Endocrine:  Hematologic/Lymphatic:  Allergic/Immunologic:  [X ] All other systems negative  [ ] Unable to assess ROS because ________    OBJECTIVE:  ICU Vital Signs Last 24 Hrs  T(C): 36.8 (03 Jan 2018 05:00), Max: 37.2 (02 Jan 2018 17:00)  T(F): 98.3 (03 Jan 2018 05:00), Max: 98.9 (02 Jan 2018 17:00)  HR: 100 (03 Jan 2018 05:00) (100 - 110)  BP: 128/93 (03 Jan 2018 05:00) (119/77 - 132/89)  BP(mean): --  ABP: --  ABP(mean): --  RR: 17 (03 Jan 2018 05:00) (12 - 17)  SpO2: 100% (03 Jan 2018 05:00) (99% - 100%)    Mode: AC/ CMV (Assist Control/ Continuous Mandatory Ventilation), RR (machine): 12, TV (machine): 500, FiO2: 40, PEEP: 5, MAP: 11.8, PIP: 40    01-02 @ 07:01  -  01-03 @ 07:00  --------------------------------------------------------  IN: 1425 mL / OUT: 1200 mL / NET: 225 mL    HOSPITAL MEDICATIONS:  MEDICATIONS  (STANDING):  ALBUTerol    90 MICROgram(s) HFA Inhaler 4 Puff(s) Inhalation every 6 hours  buDESOnide 160 MICROgram(s)/formoterol 4.5 MICROgram(s) Inhaler 2 Puff(s) Inhalation two times a day  chlorhexidine 4% Liquid 1 Application(s) Topical daily  enoxaparin Injectable 40 milliGRAM(s) SubCutaneous daily  ferrous    sulfate Liquid 300 milliGRAM(s) Enteral Tube daily  folic acid 1 milliGRAM(s) Oral daily  ipratropium 17 MICROgram(s) HFA Inhaler 1 Puff(s) Inhalation every 6 hours  lactobacillus acidophilus 1 Tablet(s) Oral four times a day with meals  melatonin 3 milliGRAM(s) Oral at bedtime  pantoprazole  Injectable 40 milliGRAM(s) IV Push daily  valproic  acid Syrup 500 milliGRAM(s) Oral every 12 hours    MEDICATIONS  (PRN):  acetaminophen    Suspension. 650 milliGRAM(s) Enteral Tube every 6 hours PRN Mild Pain to moderate (1 - 6)  calamine Lotion 1 Application(s) Topical three times a day PRN Itching  haloperidol    Injectable 1 milliGRAM(s) IV Push every 6 hours PRN Agitation  haloperidol    Injectable 1 milliGRAM(s) IntraMuscular every 6 hours PRN Agitation  LORazepam   Injectable 0.5 milliGRAM(s) IV Push every 8 hours PRN Anxiety  oxyCODONE    IR 10 milliGRAM(s) Oral three times a day PRN Severe Pain (7 - 10)

## 2018-01-03 NOTE — PROGRESS NOTE ADULT - PROBLEM SELECTOR PLAN 7
-Anxious/angry at times  -Appears depressed  -Very frustrated  - psych to see pt in am  - continues on Ativan

## 2018-01-03 NOTE — PROGRESS NOTE ADULT - ATTENDING COMMENTS
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 Closure of fistula will place the space under tension and may not be optimal solution.  Will discuss with CT surgery.  CPAP trial as tolerated.  Mood is improved today.

## 2018-01-03 NOTE — PROGRESS NOTE BEHAVIORAL HEALTH - NSBHFUPINTERVALHXFT_PSY_A_CORE
Pt seen for anxiety. Received Ativan 0.5mg IV x2 last night. Upon interview, pt is alert and communicates only through nodding or shaking his head. Pt is withdrawn and refuses to speak when asked open-ended questions. Pt nods when asked how he is doing. He nods in affirmation that he slept overnight. When asked if he is feeling anxious or depressed patient shakes his head. Pt shakes his head when asked if the team can help him with anything. Pt shakes his head when asked about SIIP or HIIP. Girlfriend states that pt has no psychiatric history and is not a safety concern. Pt seen for anxiety. Received Ativan 0.5mg IV x2 last night. Upon interview, pt is alert and communicates only through nodding or shaking his head. Pt is withdrawn and refuses to speak when asked open-ended questions. Pt nods when asked how he is doing. He nods in affirmation that he slept overnight. When asked if he is feeling anxious or depressed patient shakes his head. Pt shakes his head when asked if the team can help him with anything. Pt shakes his head when asked about SIIP or HIIP. Girlfriend states that pt has no psychiatric history, has been frustrated being in the hospital,  and is not a safety concern.

## 2018-01-03 NOTE — PROGRESS NOTE ADULT - PROBLEM SELECTOR PLAN 2
-Skin Open to ostomy bag with thick tan drainage and air  -CT surgery eval appreciated  -for OR repair next week 1/9 by CTsx

## 2018-01-03 NOTE — PROGRESS NOTE ADULT - PROBLEM SELECTOR PLAN 1
- fevers resolved  - completed voriconazole (h/o aspergillosis with sarcoidosis)  12/28 per ID  - s/p completion of levaquin and vanco

## 2018-01-04 LAB — SPECIMEN SOURCE: SIGNIFICANT CHANGE UP

## 2018-01-04 PROCEDURE — 99233 SBSQ HOSP IP/OBS HIGH 50: CPT

## 2018-01-04 RX ORDER — OXYCODONE HYDROCHLORIDE 5 MG/1
10 TABLET ORAL THREE TIMES A DAY
Qty: 0 | Refills: 0 | Status: DISCONTINUED | OUTPATIENT
Start: 2018-01-04 | End: 2018-01-10

## 2018-01-04 RX ADMIN — ALBUTEROL 4 PUFF(S): 90 AEROSOL, METERED ORAL at 22:14

## 2018-01-04 RX ADMIN — PANTOPRAZOLE SODIUM 40 MILLIGRAM(S): 20 TABLET, DELAYED RELEASE ORAL at 12:43

## 2018-01-04 RX ADMIN — ALBUTEROL 4 PUFF(S): 90 AEROSOL, METERED ORAL at 10:14

## 2018-01-04 RX ADMIN — Medication 1 TABLET(S): at 09:00

## 2018-01-04 RX ADMIN — Medication 3 MILLIGRAM(S): at 22:33

## 2018-01-04 RX ADMIN — Medication 1 TABLET(S): at 22:33

## 2018-01-04 RX ADMIN — Medication 1 PUFF(S): at 10:14

## 2018-01-04 RX ADMIN — Medication 1 PUFF(S): at 15:41

## 2018-01-04 RX ADMIN — Medication 1 MILLIGRAM(S): at 12:43

## 2018-01-04 RX ADMIN — Medication 1 TABLET(S): at 12:43

## 2018-01-04 RX ADMIN — CHLORHEXIDINE GLUCONATE 1 APPLICATION(S): 213 SOLUTION TOPICAL at 12:43

## 2018-01-04 RX ADMIN — ENOXAPARIN SODIUM 40 MILLIGRAM(S): 100 INJECTION SUBCUTANEOUS at 12:43

## 2018-01-04 RX ADMIN — ALBUTEROL 4 PUFF(S): 90 AEROSOL, METERED ORAL at 15:41

## 2018-01-04 RX ADMIN — OXYCODONE HYDROCHLORIDE 10 MILLIGRAM(S): 5 TABLET ORAL at 22:40

## 2018-01-04 RX ADMIN — ALBUTEROL 4 PUFF(S): 90 AEROSOL, METERED ORAL at 03:48

## 2018-01-04 RX ADMIN — BUDESONIDE AND FORMOTEROL FUMARATE DIHYDRATE 2 PUFF(S): 160; 4.5 AEROSOL RESPIRATORY (INHALATION) at 10:14

## 2018-01-04 RX ADMIN — Medication 1 PUFF(S): at 22:15

## 2018-01-04 RX ADMIN — BUDESONIDE AND FORMOTEROL FUMARATE DIHYDRATE 2 PUFF(S): 160; 4.5 AEROSOL RESPIRATORY (INHALATION) at 22:15

## 2018-01-04 RX ADMIN — Medication 1 PUFF(S): at 03:48

## 2018-01-04 RX ADMIN — OXYCODONE HYDROCHLORIDE 10 MILLIGRAM(S): 5 TABLET ORAL at 23:10

## 2018-01-04 RX ADMIN — Medication 300 MILLIGRAM(S): at 12:43

## 2018-01-04 RX ADMIN — Medication 1 TABLET(S): at 17:10

## 2018-01-04 NOTE — PROGRESS NOTE ADULT - ATTENDING COMMENTS
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 Discuss with CT surgery, Dr. Caldera. Plan for muscle flap closure of BPF plus mesh closure of skin vs. Eloesser flap.   OR pending for 1/9.  CPAP trial as tolerated.

## 2018-01-04 NOTE — PROGRESS NOTE ADULT - SUBJECTIVE AND OBJECTIVE BOX
CHIEF COMPLAINT: Patient is a 47y old  Male who presents with a chief complaint of Patient is a 47y old  Male who presents with a chief complaint of hemoptysis (09 Nov 2017 00:32) (09 Dec 2017 16:29)    Interval Events: none overnight      REVIEW OF SYSTEMS:  CV: denies  Resp: denies  GI: denies  [x] All other systems negative  [ ] Unable to assess ROS because ________      OBJECTIVE:  ICU Vital Signs Last 24 Hrs  T(C): 36.2 (04 Jan 2018 05:00), Max: 37.2 (03 Jan 2018 14:00)  T(F): 97.2 (04 Jan 2018 05:00), Max: 99 (03 Jan 2018 14:00)  HR: 103 (04 Jan 2018 07:42) (60 - 119)  BP: 102/69 (04 Jan 2018 05:00) (100/75 - 112/79)  BP(mean): --  ABP: --  ABP(mean): --  RR: 14 (04 Jan 2018 05:00) (12 - 22)  SpO2: 100% (04 Jan 2018 07:42) (95% - 100%)    Mode: AC/ CMV (Assist Control/ Continuous Mandatory Ventilation), RR (machine): 12, TV (machine): 500, FiO2: 40, PEEP: 5, MAP: 10, PIP: 31    01-03 @ 07:01  -  01-04 @ 07:00  --------------------------------------------------------  IN: 1534 mL / OUT: 500 mL / NET: 1034 mL        HOSPITAL MEDICATIONS:  MEDICATIONS  (STANDING):  ALBUTerol    90 MICROgram(s) HFA Inhaler 4 Puff(s) Inhalation every 6 hours  buDESOnide 160 MICROgram(s)/formoterol 4.5 MICROgram(s) Inhaler 2 Puff(s) Inhalation two times a day  chlorhexidine 4% Liquid 1 Application(s) Topical daily  enoxaparin Injectable 40 milliGRAM(s) SubCutaneous daily  ferrous    sulfate Liquid 300 milliGRAM(s) Enteral Tube daily  folic acid 1 milliGRAM(s) Oral daily  ipratropium 17 MICROgram(s) HFA Inhaler 1 Puff(s) Inhalation every 6 hours  lactobacillus acidophilus 1 Tablet(s) Oral four times a day with meals  melatonin 3 milliGRAM(s) Oral at bedtime  pantoprazole  Injectable 40 milliGRAM(s) IV Push daily    MEDICATIONS  (PRN):  acetaminophen    Suspension. 650 milliGRAM(s) Enteral Tube every 6 hours PRN Mild Pain to moderate (1 - 6)  calamine Lotion 1 Application(s) Topical three times a day PRN Itching  LORazepam   Injectable 0.5 milliGRAM(s) IV Push every 8 hours PRN Anxiety  oxyCODONE    IR 10 milliGRAM(s) Oral three times a day PRN Severe Pain (7 - 10)

## 2018-01-04 NOTE — OCCUPATIONAL THERAPY INITIAL EVALUATION ADULT - ADDITIONAL COMMENTS
Pt lives in a private home with steps to manage with family and girlfriend. Prior to Sept 2017 hospitalization, pt was independent with ADLs and functional mobility

## 2018-01-04 NOTE — OCCUPATIONAL THERAPY INITIAL EVALUATION ADULT - DIAGNOSIS, OT EVAL
Bilateral UE weakness (Left> Right), decreased postural control, decreased balance, decreased functional mobility, decreased ADL independence

## 2018-01-04 NOTE — OCCUPATIONAL THERAPY INITIAL EVALUATION ADULT - PERTINENT HX OF CURRENT PROBLEM, REHAB EVAL
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 Pt with prolonged hospital course. OT now consulted.

## 2018-01-04 NOTE — PROGRESS NOTE ADULT - PROBLEM SELECTOR PLAN 2
- with left chest wall open wound  - draining purulent fluid  - wound cx with GPC  - monitor off abx at this time  - CTsx note appreciated  - possible surgical repair next week

## 2018-01-05 LAB
BUN SERPL-MCNC: 10 MG/DL — SIGNIFICANT CHANGE UP (ref 7–23)
CALCIUM SERPL-MCNC: 9.3 MG/DL — SIGNIFICANT CHANGE UP (ref 8.4–10.5)
CHLORIDE SERPL-SCNC: 100 MMOL/L — SIGNIFICANT CHANGE UP (ref 98–107)
CO2 SERPL-SCNC: 34 MMOL/L — HIGH (ref 22–31)
CREAT SERPL-MCNC: 0.68 MG/DL — SIGNIFICANT CHANGE UP (ref 0.5–1.3)
GLUCOSE SERPL-MCNC: 112 MG/DL — HIGH (ref 70–99)
HCT VFR BLD CALC: 24.4 % — LOW (ref 39–50)
HGB BLD-MCNC: 7.4 G/DL — LOW (ref 13–17)
MCHC RBC-ENTMCNC: 28.4 PG — SIGNIFICANT CHANGE UP (ref 27–34)
MCHC RBC-ENTMCNC: 30.3 % — LOW (ref 32–36)
MCV RBC AUTO: 93.5 FL — SIGNIFICANT CHANGE UP (ref 80–100)
NRBC # FLD: 0 — SIGNIFICANT CHANGE UP
PLATELET # BLD AUTO: 211 K/UL — SIGNIFICANT CHANGE UP (ref 150–400)
PMV BLD: 12.7 FL — SIGNIFICANT CHANGE UP (ref 7–13)
POTASSIUM SERPL-MCNC: 4 MMOL/L — SIGNIFICANT CHANGE UP (ref 3.5–5.3)
POTASSIUM SERPL-SCNC: 4 MMOL/L — SIGNIFICANT CHANGE UP (ref 3.5–5.3)
RBC # BLD: 2.61 M/UL — LOW (ref 4.2–5.8)
RBC # FLD: 16.2 % — HIGH (ref 10.3–14.5)
SODIUM SERPL-SCNC: 144 MMOL/L — SIGNIFICANT CHANGE UP (ref 135–145)
WBC # BLD: 5.82 K/UL — SIGNIFICANT CHANGE UP (ref 3.8–10.5)
WBC # FLD AUTO: 5.82 K/UL — SIGNIFICANT CHANGE UP (ref 3.8–10.5)

## 2018-01-05 PROCEDURE — 99233 SBSQ HOSP IP/OBS HIGH 50: CPT | Mod: GC

## 2018-01-05 RX ORDER — FENTANYL CITRATE 50 UG/ML
1 INJECTION INTRAVENOUS
Qty: 0 | Refills: 0 | Status: DISCONTINUED | OUTPATIENT
Start: 2018-01-05 | End: 2018-01-10

## 2018-01-05 RX ADMIN — Medication 1 TABLET(S): at 10:09

## 2018-01-05 RX ADMIN — Medication 1 TABLET(S): at 13:25

## 2018-01-05 RX ADMIN — Medication 1 PUFF(S): at 22:06

## 2018-01-05 RX ADMIN — Medication 1 TABLET(S): at 18:21

## 2018-01-05 RX ADMIN — BUDESONIDE AND FORMOTEROL FUMARATE DIHYDRATE 2 PUFF(S): 160; 4.5 AEROSOL RESPIRATORY (INHALATION) at 22:06

## 2018-01-05 RX ADMIN — ALBUTEROL 4 PUFF(S): 90 AEROSOL, METERED ORAL at 16:22

## 2018-01-05 RX ADMIN — PANTOPRAZOLE SODIUM 40 MILLIGRAM(S): 20 TABLET, DELAYED RELEASE ORAL at 13:24

## 2018-01-05 RX ADMIN — ALBUTEROL 4 PUFF(S): 90 AEROSOL, METERED ORAL at 04:13

## 2018-01-05 RX ADMIN — Medication 1 TABLET(S): at 22:14

## 2018-01-05 RX ADMIN — ENOXAPARIN SODIUM 40 MILLIGRAM(S): 100 INJECTION SUBCUTANEOUS at 13:24

## 2018-01-05 RX ADMIN — Medication 1 PUFF(S): at 16:22

## 2018-01-05 RX ADMIN — Medication 0.5 MILLIGRAM(S): at 22:31

## 2018-01-05 RX ADMIN — FENTANYL CITRATE 1 PATCH: 50 INJECTION INTRAVENOUS at 18:21

## 2018-01-05 RX ADMIN — CHLORHEXIDINE GLUCONATE 1 APPLICATION(S): 213 SOLUTION TOPICAL at 13:25

## 2018-01-05 RX ADMIN — Medication 0.5 MILLIGRAM(S): at 00:37

## 2018-01-05 RX ADMIN — Medication 300 MILLIGRAM(S): at 13:24

## 2018-01-05 RX ADMIN — Medication 3 MILLIGRAM(S): at 23:13

## 2018-01-05 RX ADMIN — Medication 1 PUFF(S): at 09:46

## 2018-01-05 RX ADMIN — ALBUTEROL 4 PUFF(S): 90 AEROSOL, METERED ORAL at 22:06

## 2018-01-05 RX ADMIN — Medication 1 MILLIGRAM(S): at 13:25

## 2018-01-05 RX ADMIN — BUDESONIDE AND FORMOTEROL FUMARATE DIHYDRATE 2 PUFF(S): 160; 4.5 AEROSOL RESPIRATORY (INHALATION) at 09:46

## 2018-01-05 RX ADMIN — FENTANYL CITRATE 1 PATCH: 50 INJECTION INTRAVENOUS at 18:11

## 2018-01-05 RX ADMIN — ALBUTEROL 4 PUFF(S): 90 AEROSOL, METERED ORAL at 09:47

## 2018-01-05 RX ADMIN — Medication 1 PUFF(S): at 04:13

## 2018-01-05 NOTE — CONSULT NOTE ADULT - ASSESSMENT
46 y/o with end-stage sarcoid disease with profound hemoptysis requiring urgent left upper lobectomy now with broncho-pleuro-cutaneous fistula  - CT surgery, Dr. Caldera, planning for closure of fistula on 1/9  - Plastic surgery available to assist with reconstruction of chest wall  - Will d/w attending    p [phone]

## 2018-01-05 NOTE — CONSULT NOTE ADULT - CONSULT REQUESTED DATE/TIME
04-Dec-2017 12:07
05-Jan-2018 13:51
08-Dec-2017 11:30
09-Nov-2017 14:12
10-Nov-2017 11:39
13-Nov-2017 14:19
10-Nov-2017 11:40

## 2018-01-05 NOTE — PROGRESS NOTE ADULT - ATTENDING COMMENTS
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 CT surgery, Dr. Caldera, plans for muscle flap closure of BPF plus mesh closure of skin vs. Eloesser flap on 1/9.   CPAP trial as tolerated.

## 2018-01-05 NOTE — CONSULT NOTE ADULT - CONSULT REASON
Advance illness
Peg tube placement
Stroke
chest wall reconstruction
fever
hemoptysis
H/o CVA in setting of PFO. Recently taken off Eliquis.

## 2018-01-05 NOTE — PROGRESS NOTE ADULT - SUBJECTIVE AND OBJECTIVE BOX
Patient scheduled for OR on Tuesday-1/9/18, please obtain preop labs.  Patient might need blood products on Sunday/ Monday in preparation for OR.  Will continue to follow

## 2018-01-05 NOTE — PROGRESS NOTE ADULT - NSHPATTENDINGPLANDISCUSS_GEN_ALL_CORE
micu attending
Patient, RCU team
Patient, RCU team
RCU team
RCU team and patients family
np
np
patient, girlfriend, RCU team
fellow, ICU team
ICU team, fellow
ICU team
ICU team
[facility] resident, fellow and RN staff.
ICU team, fellow
[facility] attending!!
RCU team
np
RCU team
Patient, RCU team
RCU team

## 2018-01-05 NOTE — CONSULT NOTE ADULT - SUBJECTIVE AND OBJECTIVE BOX
Plastic Surgery Consult Note  (pg [facility]: [id number], NS: [phone])    HPI:  47 year old male with a PMHx of sarcoidosis, HTN, asthma, and COPD presents to the ED with severe leg spasms and headache leading to a presyncopal episode, found to have a significant abnormal EKG and moderate inspiratory and expiratory wheezing in the setting of Entero/Rhino virus, admitted to tele for Near Syncope, r/o ACS and COPD exacerbation.  Hospital course complicated by stroke and PAF- AC on hold due to hemoptysis;   Patient stable for discharge.  11/2-Pt ambulating w PT frequently. Feels well. Pain controlled. All labs and vital signs stable. Wound healing. Arnold po steroid taper. To complete Voriconazole on Nov 4th. CXRs and reports reviewed by and cleared by Dr. Caldera. Pt. to fu for PFO closure as output. Still requires intermittent oxygen. Cleared for discharge by Dr. Caldera to acute rehab.    Patient presented to [facility] after rehab send patient with hemoptysis, patient had 2 episodes of hemoptysis, less than 10cc in total for 24 hours.  Patient states it happens when he coughs, blood tinged sputum.  Patient otherwise doing well in rehab. (09 Nov 2017 00:32)    47 year old male with sarcoidosis, HTN, asthma, COPD, cerebrovascular infarct with PFO on echo and paroxysmal afib in September started on eliquis Was admitted c/b recurrent hemoptysis 9/21-FB/BAL done by Dr. Arias. Intraop: Bleeding from DAVID (non-lingular)-Recom IR to embolize; 9/22 CT angio done per IR Lobko request.  Formal PFTS and ask Card to fix PFO now to decrease blood flow; 9/24- Developed worsening hemoptysis. Emergently brought to OR and had  Left thoractomy DAVID. Brought to CTICU.  9/24: 1400ml EBL in OR; 10 u PRBCs;9/25-Brought back to OR for bleeding. Had Reop Thoracotomy, evac left hemothorax. (Eliquis d/c'd on 9/20, then restarted between 10/23-27 after hemoptysis improved), CTI- DAVID space despite -40 Sxn, MEYER, L Hemiparesis (CVA) better w walking; Patient post op with prolongd air leak shila ptx, bedside pleurodesis done.  Developed SIRS after pleurodesis-> CTICU; On pressors. Pt. clinically improved in CTICU. Maintained on Steroids, weaned down to Solumedrol BID. Off pressors and transitioned to Midodrine. Still with thrombocytopenia but no bleeding. Continued on Eliquis but no ASA for h/o CVA. Neuro deficits improving.  Air leak resolved on 10/27. 10/30 Chest tube removed, cxr stable.  Steriods to po taper.  d/c'd to rehab then returned 11/9 for recurrent hemoptysis requiring intubation and CTICU admission for management of hemoptysis including IR embolization (11/16) and serial bronch (11/17 and 11/20) in CTICU with no active bleeding visualized, now remains intubated with difficulty weaning vent as during CPAP trials pt fails to initiate breaths, unclear etiology. Transferred to MICU from CTICU for continued vent management and attempted weaning. Patient had bedside trach placed. The placement was complicated by hypoxia and bleeding. Patient had PEG tube placed. Patient eventually transferred to RCU for continued management.  On examination patient minimally responsive. Aunt and girlfriend at bedside.        PAST MEDICAL & SURGICAL HISTORY:  History of pneumothorax: History of 3 prior pneumonthoracies.  COPD (chronic obstructive pulmonary disease)  Asthma  Hypertension  Sarcoidosis  History of thoracotomy: 9/24/17 Left thoracotomy, Left upper lobectomy   9/25/17 Reop left thoracotomy, evacuation of left hemothorax      Allergies    No Known Allergies    Intolerances        Home Medications:  acetaminophen 325 mg oral tablet: 2 tab(s) orally every 6 hours, As needed, Mild Pain (1 - 3) (02 Nov 2017 11:46)  apixaban 5 mg oral tablet: 1 tab(s) orally every 12 hours (02 Nov 2017 11:49)  atorvastatin 80 mg oral tablet: 1 tab(s) orally once a day (at bedtime) (02 Nov 2017 11:49)  budesonide-formoterol 160 mcg-4.5 mcg/inh inhalation aerosol: 2 puff(s) inhaled 2 times a day (02 Nov 2017 11:49)  divalproex sodium 500 mg oral delayed release tablet: 1 tab(s) orally 2 times a day (02 Nov 2017 11:49)  docusate sodium 100 mg oral capsule: 1 cap(s) orally 2 times a day (02 Nov 2017 11:49)  ferrous sulfate 325 mg (65 mg elemental iron) oral tablet: 1  orally 3 times a day (02 Nov 2017 11:49)  folic acid 1 mg oral tablet: 1 tab(s) orally once a day (02 Nov 2017 11:50)  ipratropium 500 mcg/2.5 mL inhalation solution: 2.5 milliliter(s) inhaled every 6 hours (02 Nov 2017 11:49)  levalbuterol 0.63 mg/3 mL inhalation solution: 3 milliliter(s) inhaled every 6 hours (02 Nov 2017 11:49)  lidocaine 5% topical film: Apply topically to affected area once a day (02 Nov 2017 11:49)  oxyCODONE 10 mg oral tablet: 1 tab(s) orally every 4 hours, As needed, Severe Pain (7 - 10) (02 Nov 2017 11:46)  oxyCODONE 5 mg oral tablet: 1 tab(s) orally every 4 hours, As needed, Moderate Pain (4 - 6) (02 Nov 2017 11:46)  pantoprazole 40 mg oral delayed release tablet: 1 tab(s) orally 2 times a day (before meals) (02 Nov 2017 11:50)  polyethylene glycol 3350 oral powder for reconstitution: 17 gram(s) orally once a day (at bedtime) (02 Nov 2017 11:50)  predniSONE: 30 milligram(s) orally once a day until Nov 6th then 20mg daily x 5 days then 10mg daily indefinitely.  (02 Nov 2017 11:46)      MEDICATIONS  (STANDING):  ALBUTerol    90 MICROgram(s) HFA Inhaler 4 Puff(s) Inhalation every 6 hours  buDESOnide 160 MICROgram(s)/formoterol 4.5 MICROgram(s) Inhaler 2 Puff(s) Inhalation two times a day  chlorhexidine 4% Liquid 1 Application(s) Topical daily  enoxaparin Injectable 40 milliGRAM(s) SubCutaneous daily  ferrous    sulfate Liquid 300 milliGRAM(s) Enteral Tube daily  folic acid 1 milliGRAM(s) Oral daily  ipratropium 17 MICROgram(s) HFA Inhaler 1 Puff(s) Inhalation every 6 hours  lactobacillus acidophilus 1 Tablet(s) Oral four times a day with meals  melatonin 3 milliGRAM(s) Oral at bedtime  pantoprazole  Injectable 40 milliGRAM(s) IV Push daily      SOCIAL HISTORY:    FAMILY HISTORY:  Family history of pancreatic cancer  Family history of essential hypertension (Father)      ___________________________________________  REVIEW OF SYSTEMS:    Constitutional: No fevers, chills, no recent weight loss  ENMT: No changes in hearing, no changes in vision, no sore throat, no cough  Respiratory: No shortness of breath  Cardiovascular: No chest pain, palpitations  Gastrointestinal: No abdominal pain, no diarrhea/constipation  Genitourinary: No dysuria, frequency, or urgency    Extremities: No joint swelling, no limited range of movement  Neurological: No paresthesia  Skin: No rashes    ___________________________________________  OBJECTIVE:  Vital Signs Last 24 Hrs  T(C): 37.2 (05 Jan 2018 13:34), Max: 37.4 (04 Jan 2018 22:31)  T(F): 98.9 (05 Jan 2018 13:34), Max: 99.3 (04 Jan 2018 22:31)  HR: 99 (05 Jan 2018 13:34) (90 - 103)  BP: 110/82 (05 Jan 2018 13:34) (101/72 - 117/82)  BP(mean): --  RR: 14 (05 Jan 2018 13:34) (14 - 20)  SpO2: 100% (05 Jan 2018 13:34) (98% - 100%)CAPILLARY BLOOD GLUCOSE        I&O's Detail    04 Jan 2018 07:01  -  05 Jan 2018 07:00  --------------------------------------------------------  IN:    Free Water: 600 mL    Oral Fluid: 30 mL    Peptamen A.F.: 982 mL  Total IN: 1612 mL    OUT:    Incontinent per Condom Catheter: 650 mL    Other: 5 mL  Total OUT: 655 mL    Total NET: 957 mL      05 Jan 2018 07:01  -  05 Jan 2018 13:51  --------------------------------------------------------  IN:  Total IN: 0 mL    OUT:    Incontinent per Condom Catheter: 400 mL  Total OUT: 400 mL    Total NET: -400 mL          PHYSICAL EXAM:    General: Alert, NAD  Neuro: CN II-XII intact, motor and sensory grossly intact with no focal deficits.  HEENT: Normocephalic, atraumatic, EOMI  Respiratory: Breathing non-labored, airway patent  Extremities:  MSK: Intact ROM.  ____________________________________________  LABS:  CBC Full  -  ( 05 Jan 2018 06:25 )  WBC Count : 5.82 K/uL  Hemoglobin : 7.4 g/dL  Hematocrit : 24.4 %  Platelet Count - Automated : 211 K/uL  Mean Cell Volume : 93.5 fL  Mean Cell Hemoglobin : 28.4 pg  Mean Cell Hemoglobin Concentration : 30.3 %  Auto Neutrophil # : x  Auto Lymphocyte # : x  Auto Monocyte # : x  Auto Eosinophil # : x  Auto Basophil # : x  Auto Neutrophil % : x  Auto Lymphocyte % : x  Auto Monocyte % : x  Auto Eosinophil % : x  Auto Basophil % : x    01-05    144  |  100  |  10  ----------------------------<  112<H>  4.0   |  34<H>  |  0.68    Ca    9.3      05 Jan 2018 06:25                ____________________________________________  RADIOLOGY: Plastic Surgery Consult Note  (pg [facility]: [id number], NS: [phone])    HPI:  47 year old male with sarcoidosis, HTN, asthma, COPD, CVA, PFO, a-fib, 9/21. Initially brought to the ED on 9/10 for c/o leg spasms and wheezing. He had a protracted and complicated hospital stay from 9/10/17 to 11/2/17. In summary, his hospital stay is as follows: Patient was initially treated for COPD exacerbation and admitted. He later had an RRT called for AMS/obtundation concerning for seizure vs. CVA. Required MICU transfer and pressor therapy. Seen by Ruddy who started on valproate and ordered 24-hour EEG and MRI. MRI revealed B/L (R>L) LCA CVA and R PCA infarct. Patient also received a TTE, significant for + bubble study. On 10/18 patient was started on Eliquis for paroxysmal AFib noted on EKG, after which he began developing occasional hemoptysis. 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 He then underwent chemical pleurodesis procedure with talc, therapy c/b fever, hypotension and JERRI. Finally discharged to [facility] inpatient rehab. He was subsequently transferred back to [facility] due to recurrent hemoptysis requiring intubation and CTICU admission. Underwent IR embolization (11/16) and serial bronch (11/17 and 11/20) in CTICU with no active bleeding visualized, had difficulty weaning vent Transferred to MICU from CTICU for continued vent management and attempted weaning. Patient had bedside trach placed. The placement was complicated by hypoxia and bleeding. Patient had PEG tube placed. Patient eventually transferred to RCU for continued management. On 12/25 noticed area fluctuance on left chest wall at previous pigtail catheter scar. CT suggestive of bronchopleural fistula. Pt thought to have herniation of wound due likely to weakened intercostal muscles combined with prolonged positive pressure ventilation.     Plastic surgery consulted by cardiothoracic surgery for assistance with reconstruction of the chest wall.    At time of exam, the patient was sitting upright in the chair, on  CPAP. Aunt and girlfriend at bedside.      PAST MEDICAL & SURGICAL HISTORY:  History of pneumothorax: History of 3 prior pneumonthoracies.  COPD (chronic obstructive pulmonary disease)  Asthma  Hypertension  Sarcoidosis  History of thoracotomy: 9/24/17 Left thoracotomy, Left upper lobectomy   9/25/17 Reop left thoracotomy, evacuation of left hemothorax      Allergies    No Known Allergies    Intolerances        Home Medications:  acetaminophen 325 mg oral tablet: 2 tab(s) orally every 6 hours, As needed, Mild Pain (1 - 3) (02 Nov 2017 11:46)  apixaban 5 mg oral tablet: 1 tab(s) orally every 12 hours (02 Nov 2017 11:49)  atorvastatin 80 mg oral tablet: 1 tab(s) orally once a day (at bedtime) (02 Nov 2017 11:49)  budesonide-formoterol 160 mcg-4.5 mcg/inh inhalation aerosol: 2 puff(s) inhaled 2 times a day (02 Nov 2017 11:49)  divalproex sodium 500 mg oral delayed release tablet: 1 tab(s) orally 2 times a day (02 Nov 2017 11:49)  docusate sodium 100 mg oral capsule: 1 cap(s) orally 2 times a day (02 Nov 2017 11:49)  ferrous sulfate 325 mg (65 mg elemental iron) oral tablet: 1  orally 3 times a day (02 Nov 2017 11:49)  folic acid 1 mg oral tablet: 1 tab(s) orally once a day (02 Nov 2017 11:50)  ipratropium 500 mcg/2.5 mL inhalation solution: 2.5 milliliter(s) inhaled every 6 hours (02 Nov 2017 11:49)  levalbuterol 0.63 mg/3 mL inhalation solution: 3 milliliter(s) inhaled every 6 hours (02 Nov 2017 11:49)  lidocaine 5% topical film: Apply topically to affected area once a day (02 Nov 2017 11:49)  oxyCODONE 10 mg oral tablet: 1 tab(s) orally every 4 hours, As needed, Severe Pain (7 - 10) (02 Nov 2017 11:46)  oxyCODONE 5 mg oral tablet: 1 tab(s) orally every 4 hours, As needed, Moderate Pain (4 - 6) (02 Nov 2017 11:46)  pantoprazole 40 mg oral delayed release tablet: 1 tab(s) orally 2 times a day (before meals) (02 Nov 2017 11:50)  polyethylene glycol 3350 oral powder for reconstitution: 17 gram(s) orally once a day (at bedtime) (02 Nov 2017 11:50)  predniSONE: 30 milligram(s) orally once a day until Nov 6th then 20mg daily x 5 days then 10mg daily indefinitely.  (02 Nov 2017 11:46)      MEDICATIONS  (STANDING):  ALBUTerol    90 MICROgram(s) HFA Inhaler 4 Puff(s) Inhalation every 6 hours  buDESOnide 160 MICROgram(s)/formoterol 4.5 MICROgram(s) Inhaler 2 Puff(s) Inhalation two times a day  chlorhexidine 4% Liquid 1 Application(s) Topical daily  enoxaparin Injectable 40 milliGRAM(s) SubCutaneous daily  ferrous    sulfate Liquid 300 milliGRAM(s) Enteral Tube daily  folic acid 1 milliGRAM(s) Oral daily  ipratropium 17 MICROgram(s) HFA Inhaler 1 Puff(s) Inhalation every 6 hours  lactobacillus acidophilus 1 Tablet(s) Oral four times a day with meals  melatonin 3 milliGRAM(s) Oral at bedtime  pantoprazole  Injectable 40 milliGRAM(s) IV Push daily      SOCIAL HISTORY: denies alcohol, EtOH    FAMILY HISTORY:  Family history of pancreatic cancer  Family history of essential hypertension (Father)      ___________________________________________  REVIEW OF SYSTEMS:    Negative other than as stated in HPI.  ___________________________________________  OBJECTIVE:  Vital Signs Last 24 Hrs  T(C): 37.2 (05 Jan 2018 13:34), Max: 37.4 (04 Jan 2018 22:31)  T(F): 98.9 (05 Jan 2018 13:34), Max: 99.3 (04 Jan 2018 22:31)  HR: 99 (05 Jan 2018 13:34) (90 - 103)  BP: 110/82 (05 Jan 2018 13:34) (101/72 - 117/82)  BP(mean): --  RR: 14 (05 Jan 2018 13:34) (14 - 20)  SpO2: 100% (05 Jan 2018 13:34) (98% - 100%)CAPILLARY BLOOD GLUCOSE    I&O's Detail    04 Jan 2018 07:01  -  05 Jan 2018 07:00  --------------------------------------------------------  IN:    Free Water: 600 mL    Oral Fluid: 30 mL    Peptamen A.F.: 982 mL  Total IN: 1612 mL    OUT:    Incontinent per Condom Catheter: 650 mL    Other: 5 mL  Total OUT: 655 mL    Total NET: 957 mL      05 Jan 2018 07:01  -  05 Jan 2018 13:51  --------------------------------------------------------  IN:  Total IN: 0 mL    OUT:    Incontinent per Condom Catheter: 400 mL  Total OUT: 400 mL    Total NET: -400 mL    PHYSICAL EXAM:    General: Alert, NAD, sitting upright in chair on CPAP  HEENT: trach in place  Chest: open L chest wall wound with ostomy appliance in place, minimal serous drainage, no purulence noted, no surrounding erythema  Neuro: left sided weakness, difficulty vocalizing    ____________________________________________  LABS:  CBC Full  -  ( 05 Jan 2018 06:25 )  WBC Count : 5.82 K/uL  Hemoglobin : 7.4 g/dL  Hematocrit : 24.4 %  Platelet Count - Automated : 211 K/uL  Mean Cell Volume : 93.5 fL  Mean Cell Hemoglobin : 28.4 pg  Mean Cell Hemoglobin Concentration : 30.3 %  Auto Neutrophil # : x  Auto Lymphocyte # : x  Auto Monocyte # : x  Auto Eosinophil # : x  Auto Basophil # : x  Auto Neutrophil % : x  Auto Lymphocyte % : x  Auto Monocyte % : x  Auto Eosinophil % : x  Auto Basophil % : x    01-05    144  |  100  |  10  ----------------------------<  112<H>  4.0   |  34<H>  |  0.68    Ca    9.3      05 Jan 2018 06:25        ____________________________________________  RADIOLOGY:    CXR 12/25: IMPRESSION:    1. Unchanged appearance of chronic emphysematous lungs with large bulla formation.  2. Left mid chest air may represent suspected abscess although herniated lung not excluded. CT chest would be more sensitive.    CT 12/25: IMPRESSION:   Status post left upper lobectomy. A large left upper pneumothorax is slightly increased in size and with new herniation through the left anterolateral T5-T6 intercostal space extending to the skin surface of the chest wall. Given chronicity of the pneumothorax and slight interval increase in size, correlation for a bronchopleural fistula is recommended. Plastic Surgery Consult Note  (pg [facility]: [id number], NS: [phone])    HPI:  47 year old male with sarcoidosis, HTN, asthma, COPD, CVA, PFO, a-fib, 9/21. Initially brought to the ED on 9/10 for c/o leg spasms and wheezing. He had a protracted and complicated hospital stay from 9/10/17 to 11/2/17. In summary, his hospital stay is as follows: Patient was initially treated for COPD exacerbation and admitted. He later had an RRT called for AMS/obtundation concerning for seizure vs. CVA. Required MICU transfer and pressor therapy. Seen by Ruddy who started on valproate and ordered 24-hour EEG and MRI. MRI revealed B/L (R>L) LCA CVA and R PCA infarct. Patient also received a TTE, significant for + bubble study. On 10/18 patient was started on Eliquis for paroxysmal AFib noted on EKG, after which he began developing occasional hemoptysis. 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 He then underwent chemical pleurodesis procedure with talc, therapy c/b fever, hypotension and JERRI. Finally discharged to [place] inpatient rehab. He was subsequently transferred back to [facility] due to recurrent hemoptysis requiring intubation and CTICU admission. Underwent IR embolization (11/16) and serial bronch (11/17 and 11/20) in CTICU with no active bleeding visualized, had difficulty weaning vent Transferred to MICU from CTICU for continued vent management and attempted weaning. Patient had bedside trach placed. The placement was complicated by hypoxia and bleeding. Patient had PEG tube placed. Patient eventually transferred to RCU for continued management. On 12/25 noticed area fluctuance on left chest wall at previous pigtail catheter scar. CT suggestive of bronchopleural fistula. Pt thought to have herniation of wound due likely to weakened intercostal muscles combined with prolonged positive pressure ventilation.     Plastic surgery consulted by cardiothoracic surgery for assistance with reconstruction of the chest wall.    At time of exam, the patient was sitting upright in the chair, on  CPAP. Aunt and girlfriend at bedside.      PAST MEDICAL & SURGICAL HISTORY:  History of pneumothorax: History of 3 prior pneumonthoracies.  COPD (chronic obstructive pulmonary disease)  Asthma  Hypertension  Sarcoidosis  History of thoracotomy: 9/24/17 Left thoracotomy, Left upper lobectomy   9/25/17 Reop left thoracotomy, evacuation of left hemothorax      Allergies    No Known Allergies    Intolerances        Home Medications:  acetaminophen 325 mg oral tablet: 2 tab(s) orally every 6 hours, As needed, Mild Pain (1 - 3) (02 Nov 2017 11:46)  apixaban 5 mg oral tablet: 1 tab(s) orally every 12 hours (02 Nov 2017 11:49)  atorvastatin 80 mg oral tablet: 1 tab(s) orally once a day (at bedtime) (02 Nov 2017 11:49)  budesonide-formoterol 160 mcg-4.5 mcg/inh inhalation aerosol: 2 puff(s) inhaled 2 times a day (02 Nov 2017 11:49)  divalproex sodium 500 mg oral delayed release tablet: 1 tab(s) orally 2 times a day (02 Nov 2017 11:49)  docusate sodium 100 mg oral capsule: 1 cap(s) orally 2 times a day (02 Nov 2017 11:49)  ferrous sulfate 325 mg (65 mg elemental iron) oral tablet: 1  orally 3 times a day (02 Nov 2017 11:49)  folic acid 1 mg oral tablet: 1 tab(s) orally once a day (02 Nov 2017 11:50)  ipratropium 500 mcg/2.5 mL inhalation solution: 2.5 milliliter(s) inhaled every 6 hours (02 Nov 2017 11:49)  levalbuterol 0.63 mg/3 mL inhalation solution: 3 milliliter(s) inhaled every 6 hours (02 Nov 2017 11:49)  lidocaine 5% topical film: Apply topically to affected area once a day (02 Nov 2017 11:49)  oxyCODONE 10 mg oral tablet: 1 tab(s) orally every 4 hours, As needed, Severe Pain (7 - 10) (02 Nov 2017 11:46)  oxyCODONE 5 mg oral tablet: 1 tab(s) orally every 4 hours, As needed, Moderate Pain (4 - 6) (02 Nov 2017 11:46)  pantoprazole 40 mg oral delayed release tablet: 1 tab(s) orally 2 times a day (before meals) (02 Nov 2017 11:50)  polyethylene glycol 3350 oral powder for reconstitution: 17 gram(s) orally once a day (at bedtime) (02 Nov 2017 11:50)  predniSONE: 30 milligram(s) orally once a day until Nov 6th then 20mg daily x 5 days then 10mg daily indefinitely.  (02 Nov 2017 11:46)      MEDICATIONS  (STANDING):  ALBUTerol    90 MICROgram(s) HFA Inhaler 4 Puff(s) Inhalation every 6 hours  buDESOnide 160 MICROgram(s)/formoterol 4.5 MICROgram(s) Inhaler 2 Puff(s) Inhalation two times a day  chlorhexidine 4% Liquid 1 Application(s) Topical daily  enoxaparin Injectable 40 milliGRAM(s) SubCutaneous daily  ferrous    sulfate Liquid 300 milliGRAM(s) Enteral Tube daily  folic acid 1 milliGRAM(s) Oral daily  ipratropium 17 MICROgram(s) HFA Inhaler 1 Puff(s) Inhalation every 6 hours  lactobacillus acidophilus 1 Tablet(s) Oral four times a day with meals  melatonin 3 milliGRAM(s) Oral at bedtime  pantoprazole  Injectable 40 milliGRAM(s) IV Push daily      SOCIAL HISTORY: denies alcohol, EtOH    FAMILY HISTORY:  Family history of pancreatic cancer  Family history of essential hypertension (Father)      ___________________________________________  REVIEW OF SYSTEMS:    negative other than as stated in HPI  ___________________________________________  OBJECTIVE:  Vital Signs Last 24 Hrs  T(C): 37.2 (05 Jan 2018 13:34), Max: 37.4 (04 Jan 2018 22:31)  T(F): 98.9 (05 Jan 2018 13:34), Max: 99.3 (04 Jan 2018 22:31)  HR: 99 (05 Jan 2018 13:34) (90 - 103)  BP: 110/82 (05 Jan 2018 13:34) (101/72 - 117/82)  BP(mean): --  RR: 14 (05 Jan 2018 13:34) (14 - 20)  SpO2: 100% (05 Jan 2018 13:34) (98% - 100%)CAPILLARY BLOOD GLUCOSE    I&O's Detail    04 Jan 2018 07:01  -  05 Jan 2018 07:00  --------------------------------------------------------  IN:    Free Water: 600 mL    Oral Fluid: 30 mL    Peptamen A.F.: 982 mL  Total IN: 1612 mL    OUT:    Incontinent per Condom Catheter: 650 mL    Other: 5 mL  Total OUT: 655 mL    Total NET: 957 mL      05 Jan 2018 07:01  -  05 Jan 2018 13:51  --------------------------------------------------------  IN:  Total IN: 0 mL    OUT:    Incontinent per Condom Catheter: 400 mL  Total OUT: 400 mL    Total NET: -400 mL    PHYSICAL EXAM:    General: Alert, NAD, sitting upright in chair on CPAP  HEENT: trach in place  Chest: open L chest wall wound with ostomy appliance in place, minimal serous drainage, no purulence noted, no surrounding erythema  Neuro: left sided weakness, difficulty vocalizing    ____________________________________________  LABS:  CBC Full  -  ( 05 Jan 2018 06:25 )  WBC Count : 5.82 K/uL  Hemoglobin : 7.4 g/dL  Hematocrit : 24.4 %  Platelet Count - Automated : 211 K/uL  Mean Cell Volume : 93.5 fL  Mean Cell Hemoglobin : 28.4 pg  Mean Cell Hemoglobin Concentration : 30.3 %  Auto Neutrophil # : x  Auto Lymphocyte # : x  Auto Monocyte # : x  Auto Eosinophil # : x  Auto Basophil # : x  Auto Neutrophil % : x  Auto Lymphocyte % : x  Auto Monocyte % : x  Auto Eosinophil % : x  Auto Basophil % : x    01-05    144  |  100  |  10  ----------------------------<  112<H>  4.0   |  34<H>  |  0.68    Ca    9.3      05 Jan 2018 06:25        ____________________________________________  RADIOLOGY:    CXR 12/25: IMPRESSION:    1. Unchanged appearance of chronic emphysematous lungs with large bulla formation.  2. Left mid chest air may represent suspected abscess although herniated lung not excluded. CT chest would be more sensitive.    CT 12/25: IMPRESSION:   Status post left upper lobectomy. A large left upper pneumothorax is slightly increased in size and with new herniation through the left anterolateral T5-T6 intercostal space extending to the skin surface of the chest wall. Given chronicity of the pneumothorax and slight interval increase in size, correlation for a bronchopleural fistula is recommended. Plastic Surgery Consult Note  (pg [facility]: [id number], [place]: [phone])    HPI:  47 year old male with sarcoidosis, HTN, asthma, COPD, CVA, PFO, a-fib, 9/21. Initially brought to the ED on 9/10 for c/o leg spasms and wheezing. He had a protracted and complicated hospital stay from 9/10/17 to 11/2/17. In summary, his hospital stay is as follows: Patient was initially treated for COPD exacerbation and admitted. He later had an RRT called for AMS/obtundation concerning for seizure vs. CVA. Required MICU transfer and pressor therapy. Seen by Ruddy who started on valproate and ordered 24-hour EEG and MRI. MRI revealed B/L (R>L) LCA CVA and R PCA infarct. Patient also received a TTE, significant for + bubble study. On 10/18 patient was started on Eliquis for paroxysmal AFib noted on EKG, after which he began developing occasional hemoptysis. 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 He then underwent chemical pleurodesis procedure with talc, therapy c/b fever, hypotension and JERRI. Finally discharged to [place] inpatient rehab. He was subsequently transferred back to [facility] due to recurrent hemoptysis requiring intubation and CTICU admission. Underwent IR embolization (11/16) and serial bronch (11/17 and 11/20) in CTICU with no active bleeding visualized, had difficulty weaning vent Transferred to MICU from CTICU for continued vent management and attempted weaning. Patient had bedside trach placed. The placement was complicated by hypoxia and bleeding. Patient had PEG tube placed. Patient eventually transferred to RCU for continued management. On 12/25 noticed area fluctuance on left chest wall at previous pigtail catheter scar. CT suggestive of bronchopleural fistula. Pt thought to have herniation of wound due likely to weakened intercostal muscles combined with prolonged positive pressure ventilation. Plastic surgery consulted by cardiothoracic surgery for assistance with reconstruction of the chest wall.    At time of exam, the patient was sitting upright in the chair, on CPAP. Aunt and girlfriend at bedside.      PAST MEDICAL & SURGICAL HISTORY:  History of pneumothorax: History of 3 prior pneumonthoracies.  COPD (chronic obstructive pulmonary disease)  Asthma  Hypertension  Sarcoidosis  History of thoracotomy: 9/24/17 Left thoracotomy, Left upper lobectomy   9/25/17 Reop left thoracotomy, evacuation of left hemothorax      Allergies    No Known Allergies    Intolerances        Home Medications:  acetaminophen 325 mg oral tablet: 2 tab(s) orally every 6 hours, As needed, Mild Pain (1 - 3) (02 Nov 2017 11:46)  apixaban 5 mg oral tablet: 1 tab(s) orally every 12 hours (02 Nov 2017 11:49)  atorvastatin 80 mg oral tablet: 1 tab(s) orally once a day (at bedtime) (02 Nov 2017 11:49)  budesonide-formoterol 160 mcg-4.5 mcg/inh inhalation aerosol: 2 puff(s) inhaled 2 times a day (02 Nov 2017 11:49)  divalproex sodium 500 mg oral delayed release tablet: 1 tab(s) orally 2 times a day (02 Nov 2017 11:49)  docusate sodium 100 mg oral capsule: 1 cap(s) orally 2 times a day (02 Nov 2017 11:49)  ferrous sulfate 325 mg (65 mg elemental iron) oral tablet: 1  orally 3 times a day (02 Nov 2017 11:49)  folic acid 1 mg oral tablet: 1 tab(s) orally once a day (02 Nov 2017 11:50)  ipratropium 500 mcg/2.5 mL inhalation solution: 2.5 milliliter(s) inhaled every 6 hours (02 Nov 2017 11:49)  levalbuterol 0.63 mg/3 mL inhalation solution: 3 milliliter(s) inhaled every 6 hours (02 Nov 2017 11:49)  lidocaine 5% topical film: Apply topically to affected area once a day (02 Nov 2017 11:49)  oxyCODONE 10 mg oral tablet: 1 tab(s) orally every 4 hours, As needed, Severe Pain (7 - 10) (02 Nov 2017 11:46)  oxyCODONE 5 mg oral tablet: 1 tab(s) orally every 4 hours, As needed, Moderate Pain (4 - 6) (02 Nov 2017 11:46)  pantoprazole 40 mg oral delayed release tablet: 1 tab(s) orally 2 times a day (before meals) (02 Nov 2017 11:50)  polyethylene glycol 3350 oral powder for reconstitution: 17 gram(s) orally once a day (at bedtime) (02 Nov 2017 11:50)  predniSONE: 30 milligram(s) orally once a day until Nov 6th then 20mg daily x 5 days then 10mg daily indefinitely.  (02 Nov 2017 11:46)      MEDICATIONS  (STANDING):  ALBUTerol    90 MICROgram(s) HFA Inhaler 4 Puff(s) Inhalation every 6 hours  buDESOnide 160 MICROgram(s)/formoterol 4.5 MICROgram(s) Inhaler 2 Puff(s) Inhalation two times a day  chlorhexidine 4% Liquid 1 Application(s) Topical daily  enoxaparin Injectable 40 milliGRAM(s) SubCutaneous daily  ferrous    sulfate Liquid 300 milliGRAM(s) Enteral Tube daily  folic acid 1 milliGRAM(s) Oral daily  ipratropium 17 MICROgram(s) HFA Inhaler 1 Puff(s) Inhalation every 6 hours  lactobacillus acidophilus 1 Tablet(s) Oral four times a day with meals  melatonin 3 milliGRAM(s) Oral at bedtime  pantoprazole  Injectable 40 milliGRAM(s) IV Push daily      SOCIAL HISTORY: denies alcohol, EtOH    FAMILY HISTORY:  Family history of pancreatic cancer  Family history of essential hypertension (Father)      ___________________________________________  REVIEW OF SYSTEMS:    negative other than as stated in HPI  ___________________________________________  OBJECTIVE:  Vital Signs Last 24 Hrs  T(C): 37.2 (05 Jan 2018 13:34), Max: 37.4 (04 Jan 2018 22:31)  T(F): 98.9 (05 Jan 2018 13:34), Max: 99.3 (04 Jan 2018 22:31)  HR: 99 (05 Jan 2018 13:34) (90 - 103)  BP: 110/82 (05 Jan 2018 13:34) (101/72 - 117/82)  BP(mean): --  RR: 14 (05 Jan 2018 13:34) (14 - 20)  SpO2: 100% (05 Jan 2018 13:34) (98% - 100%)CAPILLARY BLOOD GLUCOSE    I&O's Detail    04 Jan 2018 07:01  -  05 Jan 2018 07:00  --------------------------------------------------------  IN:    Free Water: 600 mL    Oral Fluid: 30 mL    Peptamen A.F.: 982 mL  Total IN: 1612 mL    OUT:    Incontinent per Condom Catheter: 650 mL    Other: 5 mL  Total OUT: 655 mL    Total NET: 957 mL      05 Jan 2018 07:01  -  05 Jan 2018 13:51  --------------------------------------------------------  IN:  Total IN: 0 mL    OUT:    Incontinent per Condom Catheter: 400 mL  Total OUT: 400 mL    Total NET: -400 mL    PHYSICAL EXAM:    General: Alert, NAD, sitting upright in chair on CPAP  HEENT: trach in place  Chest: open L chest wall wound with ostomy appliance in place, minimal serous drainage, no purulence noted, no surrounding erythema  Neuro: left sided weakness, difficulty vocalizing    ____________________________________________  LABS:  CBC Full  -  ( 05 Jan 2018 06:25 )  WBC Count : 5.82 K/uL  Hemoglobin : 7.4 g/dL  Hematocrit : 24.4 %  Platelet Count - Automated : 211 K/uL  Mean Cell Volume : 93.5 fL  Mean Cell Hemoglobin : 28.4 pg  Mean Cell Hemoglobin Concentration : 30.3 %  Auto Neutrophil # : x  Auto Lymphocyte # : x  Auto Monocyte # : x  Auto Eosinophil # : x  Auto Basophil # : x  Auto Neutrophil % : x  Auto Lymphocyte % : x  Auto Monocyte % : x  Auto Eosinophil % : x  Auto Basophil % : x    01-05    144  |  100  |  10  ----------------------------<  112<H>  4.0   |  34<H>  |  0.68    Ca    9.3      05 Jan 2018 06:25        ____________________________________________  RADIOLOGY:    CXR 12/25: IMPRESSION:    1. Unchanged appearance of chronic emphysematous lungs with large bulla formation.  2. Left mid chest air may represent suspected abscess although herniated lung not excluded. CT chest would be more sensitive.    CT 12/25: IMPRESSION:   Status post left upper lobectomy. A large left upper pneumothorax is slightly increased in size and with new herniation through the left anterolateral T5-T6 intercostal space extending to the skin surface of the chest wall. Given chronicity of the pneumothorax and slight interval increase in size, correlation for a bronchopleural fistula is recommended.

## 2018-01-05 NOTE — CONSULT NOTE ADULT - CONSULT REQUESTED BY NAME
CT surgery
Dr LEANNE Caldera
Dr. Caldera
Dr. Lisker
Thoracic Surgery
Zaheer Medina
Kassandra JACOBS)

## 2018-01-05 NOTE — PROGRESS NOTE ADULT - PROBLEM SELECTOR PLAN 6
- s/p completion of high dose vanco  - contact precautions   - stools improved  - changed feeds and added lactobacillus

## 2018-01-05 NOTE — PROGRESS NOTE ADULT - SUBJECTIVE AND OBJECTIVE BOX
CHIEF COMPLAINT:    Interval Events:    REVIEW OF SYSTEMS:  Constitutional:   Eyes:  ENT:  CV:  Resp:  GI:  :  MSK:  Integumentary:  Neurological:  Psychiatric:  Endocrine:  Hematologic/Lymphatic:  Allergic/Immunologic:  [ ] All other systems negative  [ ] Unable to assess ROS because ________    OBJECTIVE:  ICU Vital Signs Last 24 Hrs  T(C): 37.3 (05 Jan 2018 05:00), Max: 37.4 (04 Jan 2018 22:31)  T(F): 99.1 (05 Jan 2018 05:00), Max: 99.3 (04 Jan 2018 22:31)  HR: 93 (05 Jan 2018 07:28) (90 - 101)  BP: 107/74 (05 Jan 2018 05:00) (100/72 - 117/82)  BP(mean): 79 (04 Jan 2018 10:00) (79 - 79)  ABP: --  ABP(mean): --  RR: 20 (05 Jan 2018 05:00) (12 - 20)  SpO2: 100% (05 Jan 2018 07:28) (98% - 100%)    Mode: AC/ CMV (Assist Control/ Continuous Mandatory Ventilation), RR (machine): 12, TV (machine): 500, FiO2: 40, PEEP: 5, MAP: 10, PIP: 32    01-04 @ 07:01 - 01-05 @ 07:00  --------------------------------------------------------  IN: 1612 mL / OUT: 655 mL / NET: 957 mL    01-05 @ 07:01 - 01-05 @ 09:43  --------------------------------------------------------  IN: 0 mL / OUT: 400 mL / NET: -400 mL      CAPILLARY BLOOD GLUCOSE          PHYSICAL EXAM:  General:   HEENT:   Lymph Nodes:  Neck:   Respiratory:   Cardiovascular:   Abdomen:   Extremities:   Skin:   Neurological:  Psychiatry:    HOSPITAL MEDICATIONS:  MEDICATIONS  (STANDING):  ALBUTerol    90 MICROgram(s) HFA Inhaler 4 Puff(s) Inhalation every 6 hours  buDESOnide 160 MICROgram(s)/formoterol 4.5 MICROgram(s) Inhaler 2 Puff(s) Inhalation two times a day  chlorhexidine 4% Liquid 1 Application(s) Topical daily  enoxaparin Injectable 40 milliGRAM(s) SubCutaneous daily  ferrous    sulfate Liquid 300 milliGRAM(s) Enteral Tube daily  folic acid 1 milliGRAM(s) Oral daily  ipratropium 17 MICROgram(s) HFA Inhaler 1 Puff(s) Inhalation every 6 hours  lactobacillus acidophilus 1 Tablet(s) Oral four times a day with meals  melatonin 3 milliGRAM(s) Oral at bedtime  pantoprazole  Injectable 40 milliGRAM(s) IV Push daily    MEDICATIONS  (PRN):  acetaminophen    Suspension. 650 milliGRAM(s) Enteral Tube every 6 hours PRN Mild Pain to moderate (1 - 6)  calamine Lotion 1 Application(s) Topical three times a day PRN Itching  LORazepam   Injectable 0.5 milliGRAM(s) IV Push every 8 hours PRN Anxiety  oxyCODONE    IR 10 milliGRAM(s) Oral three times a day PRN Severe Pain (7 - 10)      LABS:                        7.4    5.82  )-----------( 211      ( 05 Jan 2018 06:25 )             24.4     01-05    144  |  100  |  10  ----------------------------<  112<H>  4.0   |  34<H>  |  0.68    Ca    9.3      05 Jan 2018 06:25                MICROBIOLOGY:     RADIOLOGY:  [ ] Reviewed and interpreted by me    PULMONARY FUNCTION TESTS:    EKG: CHIEF COMPLAINT: Patient is a 47y old  Male who presents with a chief complaint of Patient is a 47y old  Male who presents with a chief complaint of hemoptysis (09 Nov 2017 00:32) (09 Dec 2017 16:29)    Interval Events: No acute events overnight    REVIEW OF SYSTEMS:  Constitutional: Feeling stronger  Eyes: WNL  ENT: WNL  CV: Denies CP  Resp: Denies respiratory distress  GI: Denies abdominal pain or reflux  : No difficulty voiding  MSK: Still weak but feeling stronger  Integumentary:  Neurological:  Psychiatric:  Endocrine:  Hematologic/Lymphatic:  Allergic/Immunologic:  [x] All other systems negative  [ ] Unable to assess ROS because ________    OBJECTIVE:  ICU Vital Signs Last 24 Hrs  T(C): 37.3 (05 Jan 2018 05:00), Max: 37.4 (04 Jan 2018 22:31)  T(F): 99.1 (05 Jan 2018 05:00), Max: 99.3 (04 Jan 2018 22:31)  HR: 93 (05 Jan 2018 07:28) (90 - 101)  BP: 107/74 (05 Jan 2018 05:00) (100/72 - 117/82)  BP(mean): 79 (04 Jan 2018 10:00) (79 - 79)  ABP: --  ABP(mean): --  RR: 20 (05 Jan 2018 05:00) (12 - 20)  SpO2: 100% (05 Jan 2018 07:28) (98% - 100%)    Mode: AC/ CMV (Assist Control/ Continuous Mandatory Ventilation), RR (machine): 12, TV (machine): 500, FiO2: 40, PEEP: 5, MAP: 10, PIP: 32    01-04 @ 07:01 - 01-05 @ 07:00  --------------------------------------------------------  IN: 1612 mL / OUT: 655 mL / NET: 957 mL    01-05 @ 07:01 - 01-05 @ 09:43  --------------------------------------------------------  IN: 0 mL / OUT: 400 mL / NET: -400 mL      CAPILLARY BLOOD GLUCOSE      HOSPITAL MEDICATIONS:  MEDICATIONS  (STANDING):  ALBUTerol    90 MICROgram(s) HFA Inhaler 4 Puff(s) Inhalation every 6 hours  buDESOnide 160 MICROgram(s)/formoterol 4.5 MICROgram(s) Inhaler 2 Puff(s) Inhalation two times a day  chlorhexidine 4% Liquid 1 Application(s) Topical daily  enoxaparin Injectable 40 milliGRAM(s) SubCutaneous daily  ferrous    sulfate Liquid 300 milliGRAM(s) Enteral Tube daily  folic acid 1 milliGRAM(s) Oral daily  ipratropium 17 MICROgram(s) HFA Inhaler 1 Puff(s) Inhalation every 6 hours  lactobacillus acidophilus 1 Tablet(s) Oral four times a day with meals  melatonin 3 milliGRAM(s) Oral at bedtime  pantoprazole  Injectable 40 milliGRAM(s) IV Push daily    MEDICATIONS  (PRN):  acetaminophen    Suspension. 650 milliGRAM(s) Enteral Tube every 6 hours PRN Mild Pain to moderate (1 - 6)  calamine Lotion 1 Application(s) Topical three times a day PRN Itching  LORazepam   Injectable 0.5 milliGRAM(s) IV Push every 8 hours PRN Anxiety  oxyCODONE    IR 10 milliGRAM(s) Oral three times a day PRN Severe Pain (7 - 10)      LABS:                        7.4    5.82  )-----------( 211      ( 05 Jan 2018 06:25 )             24.4     01-05    144  |  100  |  10  ----------------------------<  112<H>  4.0   |  34<H>  |  0.68    Ca    9.3      05 Jan 2018 06:25                MICROBIOLOGY:     RADIOLOGY:  [ ] Reviewed and interpreted by me    PULMONARY FUNCTION TESTS:    EKG:

## 2018-01-05 NOTE — PROGRESS NOTE ADULT - GENITOURINARY COMMENTS
Condom catheter to sbd with clear diandra urine
Condom cath with clear yellow urine.
Penis pouch to sbd with clear diandra urine
Condom cath to sbd. Patient states he's not able to use the urinal just yet

## 2018-01-05 NOTE — PROGRESS NOTE ADULT - ASSESSMENT
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 Currently on mechanical ventilation. Required trach exchange on 12/15 for cuff leak. Being treated for c.diff . 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 Scheduled for OR next week

## 2018-01-05 NOTE — CONSULT NOTE ADULT - PROVIDER SPECIALTY LIST ADULT
Gastroenterology
Infectious Disease
Neurology
Palliative Care
Plastic Surgery
Pulmonology
Cardiology

## 2018-01-05 NOTE — PROGRESS NOTE ADULT - GENITOURINARY MALE
normal external genitalia
condum catheter
condom catheter, clear diandra urine

## 2018-01-06 LAB
-  AMIKACIN: SIGNIFICANT CHANGE UP
-  AMPICILLIN/SULBACTAM: SIGNIFICANT CHANGE UP
-  CEFEPIME: SIGNIFICANT CHANGE UP
-  CEFTAZIDIME: SIGNIFICANT CHANGE UP
-  CIPROFLOXACIN: SIGNIFICANT CHANGE UP
-  GENTAMICIN: SIGNIFICANT CHANGE UP
-  LEVOFLOXACIN: SIGNIFICANT CHANGE UP
-  MEROPENEM: SIGNIFICANT CHANGE UP
-  TOBRAMYCIN: SIGNIFICANT CHANGE UP
METHOD TYPE: SIGNIFICANT CHANGE UP
ORGANISM # SPEC MICROSCOPIC CNT: SIGNIFICANT CHANGE UP

## 2018-01-06 PROCEDURE — 99233 SBSQ HOSP IP/OBS HIGH 50: CPT

## 2018-01-06 RX ADMIN — BUDESONIDE AND FORMOTEROL FUMARATE DIHYDRATE 2 PUFF(S): 160; 4.5 AEROSOL RESPIRATORY (INHALATION) at 09:34

## 2018-01-06 RX ADMIN — Medication 1 PUFF(S): at 21:14

## 2018-01-06 RX ADMIN — ALBUTEROL 4 PUFF(S): 90 AEROSOL, METERED ORAL at 15:11

## 2018-01-06 RX ADMIN — Medication 3 MILLIGRAM(S): at 22:28

## 2018-01-06 RX ADMIN — ALBUTEROL 4 PUFF(S): 90 AEROSOL, METERED ORAL at 09:33

## 2018-01-06 RX ADMIN — PANTOPRAZOLE SODIUM 40 MILLIGRAM(S): 20 TABLET, DELAYED RELEASE ORAL at 11:04

## 2018-01-06 RX ADMIN — Medication 1 MILLIGRAM(S): at 11:04

## 2018-01-06 RX ADMIN — ENOXAPARIN SODIUM 40 MILLIGRAM(S): 100 INJECTION SUBCUTANEOUS at 11:03

## 2018-01-06 RX ADMIN — Medication 0.5 MILLIGRAM(S): at 23:44

## 2018-01-06 RX ADMIN — BUDESONIDE AND FORMOTEROL FUMARATE DIHYDRATE 2 PUFF(S): 160; 4.5 AEROSOL RESPIRATORY (INHALATION) at 21:14

## 2018-01-06 RX ADMIN — Medication 1 PUFF(S): at 03:07

## 2018-01-06 RX ADMIN — CHLORHEXIDINE GLUCONATE 1 APPLICATION(S): 213 SOLUTION TOPICAL at 11:04

## 2018-01-06 RX ADMIN — ALBUTEROL 4 PUFF(S): 90 AEROSOL, METERED ORAL at 21:14

## 2018-01-06 RX ADMIN — Medication 1 PUFF(S): at 09:33

## 2018-01-06 RX ADMIN — Medication 1 TABLET(S): at 22:28

## 2018-01-06 RX ADMIN — Medication 1 PUFF(S): at 15:11

## 2018-01-06 RX ADMIN — ALBUTEROL 4 PUFF(S): 90 AEROSOL, METERED ORAL at 03:07

## 2018-01-06 RX ADMIN — Medication 300 MILLIGRAM(S): at 11:04

## 2018-01-06 RX ADMIN — Medication 1 TABLET(S): at 11:05

## 2018-01-06 RX ADMIN — Medication 1 TABLET(S): at 18:23

## 2018-01-06 RX ADMIN — Medication 1 TABLET(S): at 09:11

## 2018-01-06 NOTE — PROGRESS NOTE ADULT - NS MD HP PULSE DORSALIS
left normal/right normal
right normal/left normal
left normal/right normal
right normal/left normal

## 2018-01-06 NOTE — PROGRESS NOTE ADULT - ASSESSMENT
46 y/o with end-stage sarcoid disease with profound hemoptysis requiring urgent left upper lobectomy now with broncho-pleuro-cutaneous fistula  - CT surgery, Dr. Caldera, planning for closure of fistula on 1/9  - Plastic surgery available to assist with reconstruction of chest wall    p [phone]

## 2018-01-06 NOTE — PROGRESS NOTE ADULT - SUBJECTIVE AND OBJECTIVE BOX
CHIEF COMPLAINT:    Interval Events:    REVIEW OF SYSTEMS:  Constitutional:   Eyes:  ENT:  CV:  Resp:  GI:  :  MSK:  Integumentary:  Neurological:  Psychiatric:  Endocrine:  Hematologic/Lymphatic:  Allergic/Immunologic:  [ ] All other systems negative  [ ] Unable to assess ROS because ________    OBJECTIVE:  ICU Vital Signs Last 24 Hrs  T(C): 36.7 (06 Jan 2018 09:13), Max: 37.2 (05 Jan 2018 13:34)  T(F): 98.1 (06 Jan 2018 09:13), Max: 98.9 (05 Jan 2018 13:34)  HR: 79 (06 Jan 2018 09:13) (66 - 103)  BP: 122/86 (06 Jan 2018 09:13) (107/72 - 122/86)  BP(mean): --  ABP: --  ABP(mean): --  RR: 16 (06 Jan 2018 09:13) (12 - 20)  SpO2: 100% (06 Jan 2018 09:13) (98% - 100%)    Mode: AC/ CMV (Assist Control/ Continuous Mandatory Ventilation), RR (machine): 12, TV (machine): 500, FiO2: 40, PEEP: 5, MAP: 10, PIP: 27    01-05 @ 07:01  -  01-06 @ 07:00  --------------------------------------------------------  IN: 780 mL / OUT: 400 mL / NET: 380 mL      CAPILLARY BLOOD GLUCOSE          PHYSICAL EXAM:  General:   HEENT:   Lymph Nodes:  Neck:   Respiratory:   Cardiovascular:   Abdomen:   Extremities:   Skin:   Neurological:  Psychiatry:    HOSPITAL MEDICATIONS:  MEDICATIONS  (STANDING):  ALBUTerol    90 MICROgram(s) HFA Inhaler 4 Puff(s) Inhalation every 6 hours  buDESOnide 160 MICROgram(s)/formoterol 4.5 MICROgram(s) Inhaler 2 Puff(s) Inhalation two times a day  chlorhexidine 4% Liquid 1 Application(s) Topical daily  enoxaparin Injectable 40 milliGRAM(s) SubCutaneous daily  fentaNYL   Patch  50 MICROgram(s)/Hr 1 Patch Transdermal every 72 hours  ferrous    sulfate Liquid 300 milliGRAM(s) Enteral Tube daily  folic acid 1 milliGRAM(s) Oral daily  ipratropium 17 MICROgram(s) HFA Inhaler 1 Puff(s) Inhalation every 6 hours  lactobacillus acidophilus 1 Tablet(s) Oral four times a day with meals  melatonin 3 milliGRAM(s) Oral at bedtime  pantoprazole  Injectable 40 milliGRAM(s) IV Push daily    MEDICATIONS  (PRN):  acetaminophen    Suspension. 650 milliGRAM(s) Enteral Tube every 6 hours PRN Mild Pain to moderate (1 - 6)  calamine Lotion 1 Application(s) Topical three times a day PRN Itching  LORazepam   Injectable 0.5 milliGRAM(s) IV Push every 8 hours PRN Anxiety  oxyCODONE    IR 10 milliGRAM(s) Oral three times a day PRN Severe Pain (7 - 10)      LABS:                        7.4    5.82  )-----------( 211      ( 05 Jan 2018 06:25 )             24.4     01-05    144  |  100  |  10  ----------------------------<  112<H>  4.0   |  34<H>  |  0.68    Ca    9.3      05 Jan 2018 06:25                MICROBIOLOGY:     RADIOLOGY:  [ ] Reviewed and interpreted by me    PULMONARY FUNCTION TESTS:    EKG: CHIEF COMPLAINT: Patient is a 47y old  Male who presents with a chief complaint of Patient is a 47y old  Male who presents with a chief complaint of hemoptysis (09 Nov 2017 00:32) (09 Dec 2017 16:29)      Interval Events: none    REVIEW OF SYSTEMS:  Constitutional: No fever or chills   Eyes:   ENT:  CV: denies pain   Resp: denies   GI:  :  MSK:  Integumentary:  Neurological:   Psychiatric:  Endocrine:  Hematologic/Lymphatic:  Allergic/Immunologic:  [x ] All other systems negative  [ ] Unable to assess ROS because ________    OBJECTIVE:  ICU Vital Signs Last 24 Hrs  T(C): 36.7 (06 Jan 2018 09:13), Max: 37.2 (05 Jan 2018 13:34)  T(F): 98.1 (06 Jan 2018 09:13), Max: 98.9 (05 Jan 2018 13:34)  HR: 79 (06 Jan 2018 09:13) (66 - 103)  BP: 122/86 (06 Jan 2018 09:13) (107/72 - 122/86)  BP(mean): --  ABP: --  ABP(mean): --  RR: 16 (06 Jan 2018 09:13) (12 - 20)  SpO2: 100% (06 Jan 2018 09:13) (98% - 100%)    Mode: AC/ CMV (Assist Control/ Continuous Mandatory Ventilation), RR (machine): 12, TV (machine): 500, FiO2: 40, PEEP: 5, MAP: 10, PIP: 27    01-05 @ 07:01  -  01-06 @ 07:00  --------------------------------------------------------  IN: 780 mL / OUT: 400 mL / NET: 380 mL      CAPILLARY BLOOD GLUCOSE          HOSPITAL MEDICATIONS:  MEDICATIONS  (STANDING):  ALBUTerol    90 MICROgram(s) HFA Inhaler 4 Puff(s) Inhalation every 6 hours  buDESOnide 160 MICROgram(s)/formoterol 4.5 MICROgram(s) Inhaler 2 Puff(s) Inhalation two times a day  chlorhexidine 4% Liquid 1 Application(s) Topical daily  enoxaparin Injectable 40 milliGRAM(s) SubCutaneous daily  fentaNYL   Patch  50 MICROgram(s)/Hr 1 Patch Transdermal every 72 hours  ferrous    sulfate Liquid 300 milliGRAM(s) Enteral Tube daily  folic acid 1 milliGRAM(s) Oral daily  ipratropium 17 MICROgram(s) HFA Inhaler 1 Puff(s) Inhalation every 6 hours  lactobacillus acidophilus 1 Tablet(s) Oral four times a day with meals  melatonin 3 milliGRAM(s) Oral at bedtime  pantoprazole  Injectable 40 milliGRAM(s) IV Push daily    MEDICATIONS  (PRN):  acetaminophen    Suspension. 650 milliGRAM(s) Enteral Tube every 6 hours PRN Mild Pain to moderate (1 - 6)  calamine Lotion 1 Application(s) Topical three times a day PRN Itching  LORazepam   Injectable 0.5 milliGRAM(s) IV Push every 8 hours PRN Anxiety  oxyCODONE    IR 10 milliGRAM(s) Oral three times a day PRN Severe Pain (7 - 10)      LABS:                        7.4    5.82  )-----------( 211      ( 05 Jan 2018 06:25 )             24.4     01-05    144  |  100  |  10  ----------------------------<  112<H>  4.0   |  34<H>  |  0.68    Ca    9.3      05 Jan 2018 06:25                MICROBIOLOGY:     RADIOLOGY:  [ ] Reviewed and interpreted by me    PULMONARY FUNCTION TESTS:    EKG:

## 2018-01-06 NOTE — PROGRESS NOTE ADULT - SUBJECTIVE AND OBJECTIVE BOX
Plastic Surgery Progress Note (pg [facility]: [id number], [facility]: [phone])    SUBJECTIVE:  The patient was seen and examined. No acute events overnight.    OBJECTIVE:     ** VITAL SIGNS / I&O's **    Vital Signs Last 24 Hrs  T(C): 36.9 (06 Jan 2018 06:48), Max: 37.3 (05 Jan 2018 09:30)  T(F): 98.4 (06 Jan 2018 06:48), Max: 99.1 (05 Jan 2018 09:30)  HR: 79 (06 Jan 2018 06:48) (66 - 103)  BP: 107/72 (06 Jan 2018 06:48) (101/72 - 118/85)  BP(mean): --  RR: 15 (06 Jan 2018 06:48) (12 - 20)  SpO2: 100% (06 Jan 2018 06:48) (98% - 100%)  Mode: AC/ CMV (Assist Control/ Continuous Mandatory Ventilation)  RR (machine): 12  TV (machine): 500  FiO2: 40  PEEP: 5  MAP: 10  PIP: 27      04 Jan 2018 07:01  -  05 Jan 2018 07:00  --------------------------------------------------------  IN:    Free Water: 600 mL    Oral Fluid: 30 mL    Peptamen A.F.: 982 mL  Total IN: 1612 mL    OUT:    Incontinent per Condom Catheter: 650 mL    Other: 5 mL  Total OUT: 655 mL    Total NET: 957 mL      05 Jan 2018 07:01  -  06 Jan 2018 06:53  --------------------------------------------------------  IN:  Total IN: 0 mL    OUT:    Incontinent per Condom Catheter: 400 mL  Total OUT: 400 mL    Total NET: -400 mL          ** PHYSICAL EXAM **    General: Alert, NAD, resting in bed on vent  Chest: open L chest wall wound with ostomy appliance in place,serous drainage  Neuro: left sided weakness, difficulty vocalizing    ** LABS **                          7.4    5.82  )-----------( 211      ( 05 Jan 2018 06:25 )             24.4     05 Jan 2018 06:25    144    |  100    |  10     ----------------------------<  112    4.0     |  34     |  0.68     Ca    9.3        05 Jan 2018 06:25          ** MEDICATIONS **  MEDICATIONS  (STANDING):  ALBUTerol    90 MICROgram(s) HFA Inhaler 4 Puff(s) Inhalation every 6 hours  buDESOnide 160 MICROgram(s)/formoterol 4.5 MICROgram(s) Inhaler 2 Puff(s) Inhalation two times a day  chlorhexidine 4% Liquid 1 Application(s) Topical daily  enoxaparin Injectable 40 milliGRAM(s) SubCutaneous daily  fentaNYL   Patch  50 MICROgram(s)/Hr 1 Patch Transdermal every 72 hours  ferrous    sulfate Liquid 300 milliGRAM(s) Enteral Tube daily  folic acid 1 milliGRAM(s) Oral daily  ipratropium 17 MICROgram(s) HFA Inhaler 1 Puff(s) Inhalation every 6 hours  lactobacillus acidophilus 1 Tablet(s) Oral four times a day with meals  melatonin 3 milliGRAM(s) Oral at bedtime  pantoprazole  Injectable 40 milliGRAM(s) IV Push daily    MEDICATIONS  (PRN):  acetaminophen    Suspension. 650 milliGRAM(s) Enteral Tube every 6 hours PRN Mild Pain to moderate (1 - 6)  calamine Lotion 1 Application(s) Topical three times a day PRN Itching  LORazepam   Injectable 0.5 milliGRAM(s) IV Push every 8 hours PRN Anxiety  oxyCODONE    IR 10 milliGRAM(s) Oral three times a day PRN Severe Pain (7 - 10)

## 2018-01-06 NOTE — PROGRESS NOTE ADULT - GIT ABD PE PAL DETAILS PC
bowel sounds hyperactive
+ PEG
bowel sounds hyperactive
stools remain creamy, 2-3 X per day
bowel sounds hyperactive
bowel sounds hyperactive

## 2018-01-07 LAB
BUN SERPL-MCNC: 12 MG/DL — SIGNIFICANT CHANGE UP (ref 7–23)
CALCIUM SERPL-MCNC: 9.5 MG/DL — SIGNIFICANT CHANGE UP (ref 8.4–10.5)
CHLORIDE SERPL-SCNC: 98 MMOL/L — SIGNIFICANT CHANGE UP (ref 98–107)
CO2 SERPL-SCNC: 32 MMOL/L — HIGH (ref 22–31)
CREAT SERPL-MCNC: 0.68 MG/DL — SIGNIFICANT CHANGE UP (ref 0.5–1.3)
GLUCOSE SERPL-MCNC: 100 MG/DL — HIGH (ref 70–99)
HCT VFR BLD CALC: 26.6 % — LOW (ref 39–50)
HGB BLD-MCNC: 8 G/DL — LOW (ref 13–17)
MCHC RBC-ENTMCNC: 28.3 PG — SIGNIFICANT CHANGE UP (ref 27–34)
MCHC RBC-ENTMCNC: 30.1 % — LOW (ref 32–36)
MCV RBC AUTO: 94 FL — SIGNIFICANT CHANGE UP (ref 80–100)
NRBC # FLD: 0 — SIGNIFICANT CHANGE UP
PLATELET # BLD AUTO: 199 K/UL — SIGNIFICANT CHANGE UP (ref 150–400)
PMV BLD: 12.6 FL — SIGNIFICANT CHANGE UP (ref 7–13)
POTASSIUM SERPL-MCNC: 4.1 MMOL/L — SIGNIFICANT CHANGE UP (ref 3.5–5.3)
POTASSIUM SERPL-SCNC: 4.1 MMOL/L — SIGNIFICANT CHANGE UP (ref 3.5–5.3)
RBC # BLD: 2.83 M/UL — LOW (ref 4.2–5.8)
RBC # FLD: 15.8 % — HIGH (ref 10.3–14.5)
SODIUM SERPL-SCNC: 139 MMOL/L — SIGNIFICANT CHANGE UP (ref 135–145)
WBC # BLD: 5.24 K/UL — SIGNIFICANT CHANGE UP (ref 3.8–10.5)
WBC # FLD AUTO: 5.24 K/UL — SIGNIFICANT CHANGE UP (ref 3.8–10.5)

## 2018-01-07 PROCEDURE — 99233 SBSQ HOSP IP/OBS HIGH 50: CPT

## 2018-01-07 RX ADMIN — ALBUTEROL 4 PUFF(S): 90 AEROSOL, METERED ORAL at 03:16

## 2018-01-07 RX ADMIN — ALBUTEROL 4 PUFF(S): 90 AEROSOL, METERED ORAL at 10:07

## 2018-01-07 RX ADMIN — Medication 1 TABLET(S): at 09:15

## 2018-01-07 RX ADMIN — Medication 1 PUFF(S): at 15:36

## 2018-01-07 RX ADMIN — Medication 300 MILLIGRAM(S): at 13:45

## 2018-01-07 RX ADMIN — Medication 3 MILLIGRAM(S): at 22:34

## 2018-01-07 RX ADMIN — Medication 1 PUFF(S): at 10:06

## 2018-01-07 RX ADMIN — ALBUTEROL 4 PUFF(S): 90 AEROSOL, METERED ORAL at 21:21

## 2018-01-07 RX ADMIN — BUDESONIDE AND FORMOTEROL FUMARATE DIHYDRATE 2 PUFF(S): 160; 4.5 AEROSOL RESPIRATORY (INHALATION) at 21:21

## 2018-01-07 RX ADMIN — Medication 1 TABLET(S): at 18:43

## 2018-01-07 RX ADMIN — ALBUTEROL 4 PUFF(S): 90 AEROSOL, METERED ORAL at 15:36

## 2018-01-07 RX ADMIN — Medication 1 MILLIGRAM(S): at 13:46

## 2018-01-07 RX ADMIN — Medication 1 TABLET(S): at 22:34

## 2018-01-07 RX ADMIN — CHLORHEXIDINE GLUCONATE 1 APPLICATION(S): 213 SOLUTION TOPICAL at 13:45

## 2018-01-07 RX ADMIN — PANTOPRAZOLE SODIUM 40 MILLIGRAM(S): 20 TABLET, DELAYED RELEASE ORAL at 13:46

## 2018-01-07 RX ADMIN — ENOXAPARIN SODIUM 40 MILLIGRAM(S): 100 INJECTION SUBCUTANEOUS at 13:49

## 2018-01-07 RX ADMIN — Medication 1 TABLET(S): at 13:45

## 2018-01-07 RX ADMIN — Medication 1 PUFF(S): at 03:16

## 2018-01-07 RX ADMIN — BUDESONIDE AND FORMOTEROL FUMARATE DIHYDRATE 2 PUFF(S): 160; 4.5 AEROSOL RESPIRATORY (INHALATION) at 10:07

## 2018-01-07 RX ADMIN — Medication 1 PUFF(S): at 21:21

## 2018-01-07 NOTE — PROGRESS NOTE ADULT - PROBLEM SELECTOR PLAN 2
- with left chest wall open wound  - draining purulent fluid  - wound cx with GPC  - monitor off abx at this time  - CTsx note appreciated  - surgical repair next week with plastics to assist

## 2018-01-07 NOTE — PROGRESS NOTE ADULT - SUBJECTIVE AND OBJECTIVE BOX
CHIEF COMPLAINT:    Interval Events:    REVIEW OF SYSTEMS:  Constitutional:   Eyes:  ENT:  CV:  Resp:  GI:  :  MSK:  Integumentary:  Neurological:  Psychiatric:  Endocrine:  Hematologic/Lymphatic:  Allergic/Immunologic:  [ ] All other systems negative  [ ] Unable to assess ROS because ________    OBJECTIVE:  ICU Vital Signs Last 24 Hrs  T(C): 36.9 (07 Jan 2018 06:00), Max: 37.2 (06 Jan 2018 22:25)  T(F): 98.4 (07 Jan 2018 06:00), Max: 99 (06 Jan 2018 22:25)  HR: 98 (07 Jan 2018 06:34) (74 - 104)  BP: 124/90 (07 Jan 2018 06:00) (108/77 - 136/98)  BP(mean): --  ABP: --  ABP(mean): --  RR: 18 (07 Jan 2018 06:00) (16 - 22)  SpO2: 100% (07 Jan 2018 06:34) (99% - 100%)    Mode: AC/ CMV (Assist Control/ Continuous Mandatory Ventilation), RR (machine): 12, TV (machine): 500, FiO2: 40, PEEP: 5, MAP: 11, PIP: 27    01-06 @ 07:01  -  01-07 @ 07:00  --------------------------------------------------------  IN: 0 mL / OUT: 400 mL / NET: -400 mL      CAPILLARY BLOOD GLUCOSE          PHYSICAL EXAM:  General:   HEENT:   Lymph Nodes:  Neck:   Respiratory:   Cardiovascular:   Abdomen:   Extremities:   Skin:   Neurological:  Psychiatry:    HOSPITAL MEDICATIONS:  MEDICATIONS  (STANDING):  ALBUTerol    90 MICROgram(s) HFA Inhaler 4 Puff(s) Inhalation every 6 hours  buDESOnide 160 MICROgram(s)/formoterol 4.5 MICROgram(s) Inhaler 2 Puff(s) Inhalation two times a day  chlorhexidine 4% Liquid 1 Application(s) Topical daily  enoxaparin Injectable 40 milliGRAM(s) SubCutaneous daily  fentaNYL   Patch  50 MICROgram(s)/Hr 1 Patch Transdermal every 72 hours  ferrous    sulfate Liquid 300 milliGRAM(s) Enteral Tube daily  folic acid 1 milliGRAM(s) Oral daily  ipratropium 17 MICROgram(s) HFA Inhaler 1 Puff(s) Inhalation every 6 hours  lactobacillus acidophilus 1 Tablet(s) Oral four times a day with meals  melatonin 3 milliGRAM(s) Oral at bedtime  pantoprazole  Injectable 40 milliGRAM(s) IV Push daily    MEDICATIONS  (PRN):  acetaminophen    Suspension. 650 milliGRAM(s) Enteral Tube every 6 hours PRN Mild Pain to moderate (1 - 6)  calamine Lotion 1 Application(s) Topical three times a day PRN Itching  LORazepam   Injectable 0.5 milliGRAM(s) IV Push every 8 hours PRN Anxiety  oxyCODONE    IR 10 milliGRAM(s) Oral three times a day PRN Severe Pain (7 - 10)      LABS:                        8.0    5.24  )-----------( 199      ( 07 Jan 2018 07:18 )             26.6     01-07    139  |  98  |  12  ----------------------------<  100<H>  4.1   |  32<H>  |  0.68    Ca    9.5      07 Jan 2018 07:18                MICROBIOLOGY:     RADIOLOGY:  [ ] Reviewed and interpreted by me    PULMONARY FUNCTION TESTS:    EKG: CHIEF COMPLAINT:Patient is a 47y old  Male who presents with a chief complaint of Patient is a 47y old  Male who presents with a chief complaint of hemoptysis (09 Nov 2017 00:32) (09 Dec 2017 16:29)      Interval Events: None    REVIEW OF SYSTEMS:  Constitutional: No fever or chills   Eyes:  ENT:  CV: Denies   Resp: Denies   GI:  :  MSK:  Integumentary:  Neurological:  Psychiatric:  Endocrine:  Hematologic/Lymphatic:  Allergic/Immunologic:  xx ] All other systems negative  [ ] Unable to assess ROS because ________    OBJECTIVE:  ICU Vital Signs Last 24 Hrs  T(C): 36.9 (07 Jan 2018 06:00), Max: 37.2 (06 Jan 2018 22:25)  T(F): 98.4 (07 Jan 2018 06:00), Max: 99 (06 Jan 2018 22:25)  HR: 98 (07 Jan 2018 06:34) (74 - 104)  BP: 124/90 (07 Jan 2018 06:00) (108/77 - 136/98)  BP(mean): --  ABP: --  ABP(mean): --  RR: 18 (07 Jan 2018 06:00) (16 - 22)  SpO2: 100% (07 Jan 2018 06:34) (99% - 100%)    Mode: AC/ CMV (Assist Control/ Continuous Mandatory Ventilation), RR (machine): 12, TV (machine): 500, FiO2: 40, PEEP: 5, MAP: 11, PIP: 27    01-06 @ 07:01  -  01-07 @ 07:00  --------------------------------------------------------  IN: 0 mL / OUT: 400 mL / NET: -400 mL      CAPILLARY BLOOD GLUCOSE    HOSPITAL MEDICATIONS:  MEDICATIONS  (STANDING):  ALBUTerol    90 MICROgram(s) HFA Inhaler 4 Puff(s) Inhalation every 6 hours  buDESOnide 160 MICROgram(s)/formoterol 4.5 MICROgram(s) Inhaler 2 Puff(s) Inhalation two times a day  chlorhexidine 4% Liquid 1 Application(s) Topical daily  enoxaparin Injectable 40 milliGRAM(s) SubCutaneous daily  fentaNYL   Patch  50 MICROgram(s)/Hr 1 Patch Transdermal every 72 hours  ferrous    sulfate Liquid 300 milliGRAM(s) Enteral Tube daily  folic acid 1 milliGRAM(s) Oral daily  ipratropium 17 MICROgram(s) HFA Inhaler 1 Puff(s) Inhalation every 6 hours  lactobacillus acidophilus 1 Tablet(s) Oral four times a day with meals  melatonin 3 milliGRAM(s) Oral at bedtime  pantoprazole  Injectable 40 milliGRAM(s) IV Push daily    MEDICATIONS  (PRN):  acetaminophen    Suspension. 650 milliGRAM(s) Enteral Tube every 6 hours PRN Mild Pain to moderate (1 - 6)  calamine Lotion 1 Application(s) Topical three times a day PRN Itching  LORazepam   Injectable 0.5 milliGRAM(s) IV Push every 8 hours PRN Anxiety  oxyCODONE    IR 10 milliGRAM(s) Oral three times a day PRN Severe Pain (7 - 10)      LABS:                        8.0    5.24  )-----------( 199      ( 07 Jan 2018 07:18 )             26.6     01-07    139  |  98  |  12  ----------------------------<  100<H>  4.1   |  32<H>  |  0.68    Ca    9.5      07 Jan 2018 07:18                MICROBIOLOGY:     RADIOLOGY:  [ ] Reviewed and interpreted by me    PULMONARY FUNCTION TESTS:    EKG:

## 2018-01-08 LAB
APTT BLD: 26.6 SEC — LOW (ref 27.5–37.4)
BLD GP AB SCN SERPL QL: NEGATIVE — SIGNIFICANT CHANGE UP
BUN SERPL-MCNC: 14 MG/DL — SIGNIFICANT CHANGE UP (ref 7–23)
CALCIUM SERPL-MCNC: 9.4 MG/DL — SIGNIFICANT CHANGE UP (ref 8.4–10.5)
CHLORIDE SERPL-SCNC: 99 MMOL/L — SIGNIFICANT CHANGE UP (ref 98–107)
CO2 SERPL-SCNC: 32 MMOL/L — HIGH (ref 22–31)
CREAT SERPL-MCNC: 0.7 MG/DL — SIGNIFICANT CHANGE UP (ref 0.5–1.3)
GLUCOSE SERPL-MCNC: 99 MG/DL — SIGNIFICANT CHANGE UP (ref 70–99)
HCT VFR BLD CALC: 25.4 % — LOW (ref 39–50)
HGB BLD-MCNC: 7.7 G/DL — LOW (ref 13–17)
INR BLD: 1.2 — HIGH (ref 0.88–1.17)
MCHC RBC-ENTMCNC: 28.3 PG — SIGNIFICANT CHANGE UP (ref 27–34)
MCHC RBC-ENTMCNC: 30.3 % — LOW (ref 32–36)
MCV RBC AUTO: 93.4 FL — SIGNIFICANT CHANGE UP (ref 80–100)
NRBC # FLD: 0 — SIGNIFICANT CHANGE UP
PLATELET # BLD AUTO: 183 K/UL — SIGNIFICANT CHANGE UP (ref 150–400)
PMV BLD: 12.6 FL — SIGNIFICANT CHANGE UP (ref 7–13)
POTASSIUM SERPL-MCNC: 4.1 MMOL/L — SIGNIFICANT CHANGE UP (ref 3.5–5.3)
POTASSIUM SERPL-SCNC: 4.1 MMOL/L — SIGNIFICANT CHANGE UP (ref 3.5–5.3)
PROTHROM AB SERPL-ACNC: 13.4 SEC — HIGH (ref 9.8–13.1)
RBC # BLD: 2.72 M/UL — LOW (ref 4.2–5.8)
RBC # FLD: 15.5 % — HIGH (ref 10.3–14.5)
RH IG SCN BLD-IMP: POSITIVE — SIGNIFICANT CHANGE UP
SODIUM SERPL-SCNC: 141 MMOL/L — SIGNIFICANT CHANGE UP (ref 135–145)
WBC # BLD: 5.95 K/UL — SIGNIFICANT CHANGE UP (ref 3.8–10.5)
WBC # FLD AUTO: 5.95 K/UL — SIGNIFICANT CHANGE UP (ref 3.8–10.5)

## 2018-01-08 PROCEDURE — 99233 SBSQ HOSP IP/OBS HIGH 50: CPT | Mod: GC

## 2018-01-08 RX ADMIN — ENOXAPARIN SODIUM 40 MILLIGRAM(S): 100 INJECTION SUBCUTANEOUS at 12:44

## 2018-01-08 RX ADMIN — ALBUTEROL 4 PUFF(S): 90 AEROSOL, METERED ORAL at 09:58

## 2018-01-08 RX ADMIN — Medication 1 PUFF(S): at 21:53

## 2018-01-08 RX ADMIN — Medication 1 TABLET(S): at 22:40

## 2018-01-08 RX ADMIN — Medication 1 PUFF(S): at 09:58

## 2018-01-08 RX ADMIN — ALBUTEROL 4 PUFF(S): 90 AEROSOL, METERED ORAL at 21:53

## 2018-01-08 RX ADMIN — Medication 300 MILLIGRAM(S): at 12:44

## 2018-01-08 RX ADMIN — CHLORHEXIDINE GLUCONATE 1 APPLICATION(S): 213 SOLUTION TOPICAL at 12:45

## 2018-01-08 RX ADMIN — FENTANYL CITRATE 1 PATCH: 50 INJECTION INTRAVENOUS at 17:16

## 2018-01-08 RX ADMIN — BUDESONIDE AND FORMOTEROL FUMARATE DIHYDRATE 2 PUFF(S): 160; 4.5 AEROSOL RESPIRATORY (INHALATION) at 21:53

## 2018-01-08 RX ADMIN — Medication 1 TABLET(S): at 17:16

## 2018-01-08 RX ADMIN — ALBUTEROL 4 PUFF(S): 90 AEROSOL, METERED ORAL at 03:46

## 2018-01-08 RX ADMIN — Medication 1 PUFF(S): at 16:34

## 2018-01-08 RX ADMIN — Medication 1 PUFF(S): at 03:47

## 2018-01-08 RX ADMIN — ALBUTEROL 4 PUFF(S): 90 AEROSOL, METERED ORAL at 16:35

## 2018-01-08 RX ADMIN — PANTOPRAZOLE SODIUM 40 MILLIGRAM(S): 20 TABLET, DELAYED RELEASE ORAL at 12:44

## 2018-01-08 RX ADMIN — Medication 1 MILLIGRAM(S): at 12:44

## 2018-01-08 RX ADMIN — Medication 1 TABLET(S): at 09:55

## 2018-01-08 RX ADMIN — Medication 1 TABLET(S): at 12:44

## 2018-01-08 RX ADMIN — Medication 0.5 MILLIGRAM(S): at 22:40

## 2018-01-08 RX ADMIN — BUDESONIDE AND FORMOTEROL FUMARATE DIHYDRATE 2 PUFF(S): 160; 4.5 AEROSOL RESPIRATORY (INHALATION) at 09:58

## 2018-01-08 NOTE — PROGRESS NOTE ADULT - PSYCHIATRIC DETAILS
anxious/depressed
anxious
normal affect
anxious/at times
depressed/flat affect
depressed
angry outbursts at nurse and girlfriend today/depressed/anxious
normal affect/normal behavior
normal affect/normal behavior
anxious
at times/anxious
depressed/angry
flat affect
normal affect/normal behavior

## 2018-01-08 NOTE — PROGRESS NOTE ADULT - PROBLEM SELECTOR PLAN 3
- s/p trach  - tolerating current vent settings  - will wean as tolerated tolerating cpap trials intermittently   - monitor respiratory status  - daily trach care  - suction prn

## 2018-01-08 NOTE — PROGRESS NOTE ADULT - NEUROLOGICAL DETAILS
responds to verbal commands/alert and oriented x 3/responds to pain
alert and oriented x 3/responds to pain/responds to verbal commands
strength decreased/left side/alert and oriented x 3
alert and oriented x 3
responds to pain/alert and oriented x 3/responds to verbal commands
alert and oriented x 3/strength decreased/responds to verbal commands/responds to pain
alert and oriented x 3/strength decreased/Left side weaker than left. Strength improving
responds to pain/responds to verbal commands/alert and oriented x 3
alert and oriented x 3
alert and oriented x 3/responds to verbal commands/responds to pain
alert and oriented x 3/responds to pain/responds to verbal commands
alert and oriented x 3/responds to pain/responds to verbal commands
responds to pain/follows simple commands, shakes head yes/no/responds to verbal commands
strength decreased/Strength improving/alert and oriented x 3
responds to pain/responds to verbal commands
responds to pain/responds to verbal commands/alert and oriented x 3
responds to verbal commands/sensation intact/responds to pain/alert and oriented x 3
alert and oriented x 3/normal strength
alert and oriented x 3
alert and oriented x 3/normal strength

## 2018-01-08 NOTE — PROGRESS NOTE ADULT - ATTENDING COMMENTS
Pt is now finally stable -   hemodynamically stable  tolerating several hours of weaning daily  good tidal volumes.  diarrhea has resolved, tolerating feeds, getting nutrition  afebrile, off abx.  I have d/w thoracic surgery today - I am concerned that surgery at this time could worsen his overall condition and recovery.  Would instead continue with current ostomy covering, allow pt to get rehab and improved nutritional status. the reconstruction can be done at a later time. Dr. Caldera is in agreement.  Will plan for placement.

## 2018-01-08 NOTE — PROGRESS NOTE ADULT - SUBJECTIVE AND OBJECTIVE BOX
Plastic Surgery Progress Note (pg [facility]: [id number], [facility]: [phone])    SUBJECTIVE:  The patient was seen and examined this morning during rounds. H&H this morning 7.7/25.4.    OBJECTIVE:     ** VITAL SIGNS / I&O's **    Vital Signs Last 24 Hrs  T(C): 36.9 (08 Jan 2018 06:09), Max: 36.9 (07 Jan 2018 13:45)  T(F): 98.4 (08 Jan 2018 06:09), Max: 98.4 (07 Jan 2018 13:45)  HR: 91 (08 Jan 2018 07:49) (20 - 117)  BP: 116/82 (08 Jan 2018 06:09) (103/77 - 116/82)  BP(mean): --  RR: 18 (08 Jan 2018 06:09) (16 - 22)  SpO2: 100% (08 Jan 2018 07:49) (98% - 100%)  Mode: AC/ CMV (Assist Control/ Continuous Mandatory Ventilation)  RR (machine): 12  TV (machine): 500  FiO2: 40  PEEP: 5  MAP: 10  PIP: 33      07 Jan 2018 07:01  -  08 Jan 2018 07:00  --------------------------------------------------------  IN:    Free Water: 600 mL    Oral Fluid: 480 mL    Peptamen A.F.: 480 mL  Total IN: 1560 mL    OUT:  Total OUT: 0 mL    Total NET: 1560 mL          ** PHYSICAL EXAM **    General: Alert, NAD, trach'd, resting in bed on vent  Chest: open L chest wall wound with ostomy appliance in place, serous drainage    ** LABS **                          7.7    5.95  )-----------( 183      ( 08 Jan 2018 03:27 )             25.4     08 Jan 2018 03:27    141    |  99     |  14     ----------------------------<  99     4.1     |  32     |  0.70     Ca    9.4        08 Jan 2018 03:27      PT/INR - ( 08 Jan 2018 03:27 )   PT: 13.4 SEC;   INR: 1.20          PTT - ( 08 Jan 2018 03:27 )  PTT:26.6 SEC  CAPILLARY BLOOD GLUCOSE              ** MEDICATIONS **  MEDICATIONS  (STANDING):  ALBUTerol    90 MICROgram(s) HFA Inhaler 4 Puff(s) Inhalation every 6 hours  buDESOnide 160 MICROgram(s)/formoterol 4.5 MICROgram(s) Inhaler 2 Puff(s) Inhalation two times a day  chlorhexidine 4% Liquid 1 Application(s) Topical daily  enoxaparin Injectable 40 milliGRAM(s) SubCutaneous daily  fentaNYL   Patch  50 MICROgram(s)/Hr 1 Patch Transdermal every 72 hours  ferrous    sulfate Liquid 300 milliGRAM(s) Enteral Tube daily  folic acid 1 milliGRAM(s) Oral daily  ipratropium 17 MICROgram(s) HFA Inhaler 1 Puff(s) Inhalation every 6 hours  lactobacillus acidophilus 1 Tablet(s) Oral four times a day with meals  melatonin 3 milliGRAM(s) Oral at bedtime  pantoprazole  Injectable 40 milliGRAM(s) IV Push daily    MEDICATIONS  (PRN):  acetaminophen    Suspension. 650 milliGRAM(s) Enteral Tube every 6 hours PRN Mild Pain to moderate (1 - 6)  calamine Lotion 1 Application(s) Topical three times a day PRN Itching  LORazepam   Injectable 0.5 milliGRAM(s) IV Push every 8 hours PRN Anxiety  oxyCODONE    IR 10 milliGRAM(s) Oral three times a day PRN Severe Pain (7 - 10)

## 2018-01-08 NOTE — PROGRESS NOTE ADULT - CONSTITUTIONAL DETAILS
no distress
no distress
well-nourished/no distress
no distress
no distress
no distress/Pt oob to chair
no distress
well-nourished/no distress
well-nourished/no distress
no distress
respiratory distress
well-nourished/well-groomed/no distress
respiratory distress
no distress
respiratory distress/distress due to pain

## 2018-01-08 NOTE — PROGRESS NOTE ADULT - NECK DETAILS
supple
tracheostomy present
+ trach
+ trach/supple
+ trach/supple
supple
tracheostomy present
tracheostomy present
no JVD/supple
no JVD/supple
trach present
tracheostomy present
no JVD/supple
supple/no JVD
trach present
tracheostomy present
supple/no JVD
supple/no JVD
tracheostomy present
58 y/o M pt BIBA with general weakness x2-3 days resulting in a mechanical fall while getting out of bed today. Plan for CXR, blood work, urine, and reassess.

## 2018-01-08 NOTE — PROGRESS NOTE ADULT - EXTREMITIES
detailed exam
No cyanosis, clubbing or edema
No cyanosis, clubbing or edema
detailed exam
No cyanosis, clubbing or edema
No cyanosis, clubbing or edema
detailed exam
No cyanosis, clubbing or edema
detailed exam
No cyanosis, clubbing or edema
detailed exam

## 2018-01-08 NOTE — PROGRESS NOTE ADULT - NS MD HP PULSE RADIAL
right normal/left normal
left normal/right normal
right normal/left normal
left normal/right normal
right normal/left normal
right normal
right normal/left normal
right normal/left normal
left normal/right normal

## 2018-01-08 NOTE — PROGRESS NOTE ADULT - PROBLEM SELECTOR PLAN 2
- with left chest wall open wound  - draining serous fluid  - monitor off abx at this time  - Dr Lisker d/w CTS and plan is hold off surgery at this time, for repair at a later time. Will d/c to [facility] with ostomy in place and care instructions

## 2018-01-08 NOTE — PROGRESS NOTE ADULT - SUBJECTIVE AND OBJECTIVE BOX
CHIEF COMPLAINT:    Interval Events:    REVIEW OF SYSTEMS:  Constitutional:   Eyes:  ENT:  CV:  Resp:  GI:  :  MSK:  Integumentary:  Neurological:  Psychiatric:  Endocrine:  Hematologic/Lymphatic:  Allergic/Immunologic:  [ ] All other systems negative  [ ] Unable to assess ROS because ________    OBJECTIVE:  ICU Vital Signs Last 24 Hrs  T(C): 36.9 (08 Jan 2018 06:09), Max: 36.9 (07 Jan 2018 13:45)  T(F): 98.4 (08 Jan 2018 06:09), Max: 98.4 (07 Jan 2018 13:45)  HR: 91 (08 Jan 2018 07:49) (20 - 117)  BP: 116/82 (08 Jan 2018 06:09) (103/77 - 116/82)  BP(mean): --  ABP: --  ABP(mean): --  RR: 18 (08 Jan 2018 06:09) (16 - 22)  SpO2: 100% (08 Jan 2018 07:49) (98% - 100%)    Mode: AC/ CMV (Assist Control/ Continuous Mandatory Ventilation), RR (machine): 12, TV (machine): 500, FiO2: 40, PEEP: 5, MAP: 10, PIP: 33    01-07 @ 07:01  -  01-08 @ 07:00  --------------------------------------------------------  IN: 1560 mL / OUT: 0 mL / NET: 1560 mL      CAPILLARY BLOOD GLUCOSE          PHYSICAL EXAM:  General:   HEENT:   Lymph Nodes:  Neck:   Respiratory:   Cardiovascular:   Abdomen:   Extremities:   Skin:   Neurological:  Psychiatry:    HOSPITAL MEDICATIONS:  MEDICATIONS  (STANDING):  ALBUTerol    90 MICROgram(s) HFA Inhaler 4 Puff(s) Inhalation every 6 hours  buDESOnide 160 MICROgram(s)/formoterol 4.5 MICROgram(s) Inhaler 2 Puff(s) Inhalation two times a day  chlorhexidine 4% Liquid 1 Application(s) Topical daily  enoxaparin Injectable 40 milliGRAM(s) SubCutaneous daily  fentaNYL   Patch  50 MICROgram(s)/Hr 1 Patch Transdermal every 72 hours  ferrous    sulfate Liquid 300 milliGRAM(s) Enteral Tube daily  folic acid 1 milliGRAM(s) Oral daily  ipratropium 17 MICROgram(s) HFA Inhaler 1 Puff(s) Inhalation every 6 hours  lactobacillus acidophilus 1 Tablet(s) Oral four times a day with meals  melatonin 3 milliGRAM(s) Oral at bedtime  pantoprazole  Injectable 40 milliGRAM(s) IV Push daily    MEDICATIONS  (PRN):  acetaminophen    Suspension. 650 milliGRAM(s) Enteral Tube every 6 hours PRN Mild Pain to moderate (1 - 6)  calamine Lotion 1 Application(s) Topical three times a day PRN Itching  LORazepam   Injectable 0.5 milliGRAM(s) IV Push every 8 hours PRN Anxiety  oxyCODONE    IR 10 milliGRAM(s) Oral three times a day PRN Severe Pain (7 - 10)      LABS:                        7.7    5.95  )-----------( 183      ( 08 Jan 2018 03:27 )             25.4     01-08    141  |  99  |  14  ----------------------------<  99  4.1   |  32<H>  |  0.70    Ca    9.4      08 Jan 2018 03:27      PT/INR - ( 08 Jan 2018 03:27 )   PT: 13.4 SEC;   INR: 1.20          PTT - ( 08 Jan 2018 03:27 )  PTT:26.6 SEC          MICROBIOLOGY:     RADIOLOGY:  [ ] Reviewed and interpreted by me    PULMONARY FUNCTION TESTS:    EKG: CHIEF COMPLAINT: Patient is a 47y old  Male who presents with a chief complaint of Patient is a 47y old  Male who presents with a chief complaint of hemoptysis (09 Nov 2017 00:32) (09 Dec 2017 16:29)      Interval Events: Surgery cancelled, pt to be d/c to rehab     REVIEW OF SYSTEMS:  Constitutional: no fever or chills   Eyes:  ENT:  CV: denies  Resp: denies  GI:  :  MSK:  Integumentary:  Neurological:  Psychiatric:  Endocrine:  Hematologic/Lymphatic:  Allergic/Immunologic:  [ x] All other systems negative  [ ] Unable to assess ROS because ________    OBJECTIVE:  ICU Vital Signs Last 24 Hrs  T(C): 36.9 (08 Jan 2018 06:09), Max: 36.9 (07 Jan 2018 13:45)  T(F): 98.4 (08 Jan 2018 06:09), Max: 98.4 (07 Jan 2018 13:45)  HR: 91 (08 Jan 2018 07:49) (20 - 117)  BP: 116/82 (08 Jan 2018 06:09) (103/77 - 116/82)  BP(mean): --  ABP: --  ABP(mean): --  RR: 18 (08 Jan 2018 06:09) (16 - 22)  SpO2: 100% (08 Jan 2018 07:49) (98% - 100%)    Mode: AC/ CMV (Assist Control/ Continuous Mandatory Ventilation), RR (machine): 12, TV (machine): 500, FiO2: 40, PEEP: 5, MAP: 10, PIP: 33    01-07 @ 07:01  -  01-08 @ 07:00  --------------------------------------------------------  IN: 1560 mL / OUT: 0 mL / NET: 1560 mL      CAPILLARY BLOOD GLUCOSE      HOSPITAL MEDICATIONS:  MEDICATIONS  (STANDING):  ALBUTerol    90 MICROgram(s) HFA Inhaler 4 Puff(s) Inhalation every 6 hours  buDESOnide 160 MICROgram(s)/formoterol 4.5 MICROgram(s) Inhaler 2 Puff(s) Inhalation two times a day  chlorhexidine 4% Liquid 1 Application(s) Topical daily  enoxaparin Injectable 40 milliGRAM(s) SubCutaneous daily  fentaNYL   Patch  50 MICROgram(s)/Hr 1 Patch Transdermal every 72 hours  ferrous    sulfate Liquid 300 milliGRAM(s) Enteral Tube daily  folic acid 1 milliGRAM(s) Oral daily  ipratropium 17 MICROgram(s) HFA Inhaler 1 Puff(s) Inhalation every 6 hours  lactobacillus acidophilus 1 Tablet(s) Oral four times a day with meals  melatonin 3 milliGRAM(s) Oral at bedtime  pantoprazole  Injectable 40 milliGRAM(s) IV Push daily    MEDICATIONS  (PRN):  acetaminophen    Suspension. 650 milliGRAM(s) Enteral Tube every 6 hours PRN Mild Pain to moderate (1 - 6)  calamine Lotion 1 Application(s) Topical three times a day PRN Itching  LORazepam   Injectable 0.5 milliGRAM(s) IV Push every 8 hours PRN Anxiety  oxyCODONE    IR 10 milliGRAM(s) Oral three times a day PRN Severe Pain (7 - 10)      LABS:                        7.7    5.95  )-----------( 183      ( 08 Jan 2018 03:27 )             25.4     01-08    141  |  99  |  14  ----------------------------<  99  4.1   |  32<H>  |  0.70    Ca    9.4      08 Jan 2018 03:27      PT/INR - ( 08 Jan 2018 03:27 )   PT: 13.4 SEC;   INR: 1.20          PTT - ( 08 Jan 2018 03:27 )  PTT:26.6 SEC          MICROBIOLOGY:     RADIOLOGY:  [ ] Reviewed and interpreted by me    PULMONARY FUNCTION TESTS:    EKG:

## 2018-01-08 NOTE — PROGRESS NOTE ADULT - ASSESSMENT
46 y/o with end-stage sarcoid disease with profound hemoptysis requiring urgent left upper lobectomy now with broncho-pleuro-cutaneous fistula  - CT surgery, Dr. Caldera, planning for closure of fistula on 1/9  - Plastic surgery available to assist with reconstruction of chest wall    p [phone]

## 2018-01-08 NOTE — PROGRESS NOTE ADULT - MS EXT PE MLT D E PC
no pedal edema/no cyanosis/no clubbing
no pedal edema/no cyanosis/no clubbing
no cyanosis/no clubbing
no cyanosis/no clubbing
no clubbing/no cyanosis
no pedal edema/no cyanosis/no clubbing
pedal edema/no cyanosis
no cyanosis/pedal edema/no clubbing
no clubbing/no pedal edema/no cyanosis
no pedal edema/no cyanosis/no clubbing
clubbing/no cyanosis
no pedal edema/no clubbing/no cyanosis
no clubbing/no cyanosis/no pedal edema
no cyanosis
no cyanosis
clubbing/no cyanosis/pedal edema
no pedal edema/clubbing/no cyanosis

## 2018-01-09 LAB
BUN SERPL-MCNC: 11 MG/DL — SIGNIFICANT CHANGE UP (ref 7–23)
CALCIUM SERPL-MCNC: 9.3 MG/DL — SIGNIFICANT CHANGE UP (ref 8.4–10.5)
CHLORIDE SERPL-SCNC: 101 MMOL/L — SIGNIFICANT CHANGE UP (ref 98–107)
CO2 SERPL-SCNC: 32 MMOL/L — HIGH (ref 22–31)
CREAT SERPL-MCNC: 0.71 MG/DL — SIGNIFICANT CHANGE UP (ref 0.5–1.3)
GLUCOSE SERPL-MCNC: 97 MG/DL — SIGNIFICANT CHANGE UP (ref 70–99)
HCT VFR BLD CALC: 26 % — LOW (ref 39–50)
HGB BLD-MCNC: 8.1 G/DL — LOW (ref 13–17)
MCHC RBC-ENTMCNC: 28.7 PG — SIGNIFICANT CHANGE UP (ref 27–34)
MCHC RBC-ENTMCNC: 31.2 % — LOW (ref 32–36)
MCV RBC AUTO: 92.2 FL — SIGNIFICANT CHANGE UP (ref 80–100)
NRBC # FLD: 0 — SIGNIFICANT CHANGE UP
PLATELET # BLD AUTO: 174 K/UL — SIGNIFICANT CHANGE UP (ref 150–400)
PMV BLD: 13.2 FL — HIGH (ref 7–13)
POTASSIUM SERPL-MCNC: 4 MMOL/L — SIGNIFICANT CHANGE UP (ref 3.5–5.3)
POTASSIUM SERPL-SCNC: 4 MMOL/L — SIGNIFICANT CHANGE UP (ref 3.5–5.3)
RBC # BLD: 2.82 M/UL — LOW (ref 4.2–5.8)
RBC # FLD: 15.7 % — HIGH (ref 10.3–14.5)
SODIUM SERPL-SCNC: 143 MMOL/L — SIGNIFICANT CHANGE UP (ref 135–145)
WBC # BLD: 5.27 K/UL — SIGNIFICANT CHANGE UP (ref 3.8–10.5)
WBC # FLD AUTO: 5.27 K/UL — SIGNIFICANT CHANGE UP (ref 3.8–10.5)

## 2018-01-09 PROCEDURE — 99233 SBSQ HOSP IP/OBS HIGH 50: CPT | Mod: GC

## 2018-01-09 RX ADMIN — ALBUTEROL 4 PUFF(S): 90 AEROSOL, METERED ORAL at 15:56

## 2018-01-09 RX ADMIN — Medication 1 PUFF(S): at 10:35

## 2018-01-09 RX ADMIN — PANTOPRAZOLE SODIUM 40 MILLIGRAM(S): 20 TABLET, DELAYED RELEASE ORAL at 11:20

## 2018-01-09 RX ADMIN — CHLORHEXIDINE GLUCONATE 1 APPLICATION(S): 213 SOLUTION TOPICAL at 11:20

## 2018-01-09 RX ADMIN — Medication 0.5 MILLIGRAM(S): at 21:59

## 2018-01-09 RX ADMIN — Medication 1 TABLET(S): at 11:20

## 2018-01-09 RX ADMIN — Medication 1 TABLET(S): at 15:20

## 2018-01-09 RX ADMIN — BUDESONIDE AND FORMOTEROL FUMARATE DIHYDRATE 2 PUFF(S): 160; 4.5 AEROSOL RESPIRATORY (INHALATION) at 10:35

## 2018-01-09 RX ADMIN — ALBUTEROL 4 PUFF(S): 90 AEROSOL, METERED ORAL at 21:52

## 2018-01-09 RX ADMIN — ALBUTEROL 4 PUFF(S): 90 AEROSOL, METERED ORAL at 03:45

## 2018-01-09 RX ADMIN — Medication 1 TABLET(S): at 17:00

## 2018-01-09 RX ADMIN — ALBUTEROL 4 PUFF(S): 90 AEROSOL, METERED ORAL at 10:34

## 2018-01-09 RX ADMIN — Medication 1 PUFF(S): at 03:45

## 2018-01-09 RX ADMIN — Medication 1 TABLET(S): at 22:00

## 2018-01-09 RX ADMIN — Medication 300 MILLIGRAM(S): at 11:20

## 2018-01-09 RX ADMIN — Medication 1 PUFF(S): at 15:56

## 2018-01-09 RX ADMIN — Medication 1 MILLIGRAM(S): at 11:20

## 2018-01-09 RX ADMIN — ENOXAPARIN SODIUM 40 MILLIGRAM(S): 100 INJECTION SUBCUTANEOUS at 11:20

## 2018-01-09 RX ADMIN — Medication 1 PUFF(S): at 21:52

## 2018-01-09 RX ADMIN — BUDESONIDE AND FORMOTEROL FUMARATE DIHYDRATE 2 PUFF(S): 160; 4.5 AEROSOL RESPIRATORY (INHALATION) at 21:52

## 2018-01-09 NOTE — PROGRESS NOTE ADULT - PROBLEM SELECTOR PROBLEM 1
Hemoptysis
Declining functional status
Hemoptysis
Hemoptysis
Acute on chronic respiratory failure, unspecified whether with hypoxia or hypercapnia
Febrile
Hemoptysis
Hernia
Respiratory failure
Sarcoidosis
Respiratory failure
Febrile
Respiratory failure
Febrile
Respiratory failure
Febrile

## 2018-01-09 NOTE — PROGRESS NOTE ADULT - BREASTS
not examined
No masses; no nipple discharge
not examined

## 2018-01-09 NOTE — CHART NOTE - NSCHARTNOTEFT_GEN_A_CORE
Source:  nursing, EMR, ADS    Diet : NPO with tube feed - Peptamen 1.5 @ 40 ml/hr 24hrs and no carb Pro source 1 pack daily     Patient tolerating EN , will benefit from increased feed 2/2 increased nutrition need for wound healing , medical team made aware. Pt. alert, oriented , trached , unable to speak , noted pressure ulcer       Enteral : EN provides 1440 kcal & 65 gm protein/d.       Current Weight: - 79.3 kg on 1/9/18 ; 75 kg on 11/10/17 ; 88.3 kg on 12/26/17.     Pertinent Medications: MEDICATIONS  (STANDING):    ALBUTerol    90 MICROgram(s) HFA Inhaler 4 Puff(s) Inhalation every 6 hours  buDESOnide 160 MICROgram(s)/formoterol 4.5 MICROgram(s) Inhaler 2 Puff(s) Inhalation two times a day  chlorhexidine 4% Liquid 1 Application(s) Topical daily  enoxaparin Injectable 40 milliGRAM(s) SubCutaneous daily  fentaNYL   Patch  50 MICROgram(s)/Hr 1 Patch Transdermal every 72 hours  ferrous    sulfate Liquid 300 milliGRAM(s) Enteral Tube daily  folic acid 1 milliGRAM(s) Oral daily  ipratropium 17 MICROgram(s) HFA Inhaler 1 Puff(s) Inhalation every 6 hours  lactobacillus acidophilus 1 Tablet(s) Oral four times a day with meals  melatonin 3 milliGRAM(s) Oral at bedtime  pantoprazole  Injectable 40 milliGRAM(s) IV Push daily    MEDICATIONS  (PRN):  acetaminophen    Suspension. 650 milliGRAM(s) Enteral Tube every 6 hours PRN Mild Pain to moderate (1 - 6)  calamine Lotion 1 Application(s) Topical three times a day PRN Itching  LORazepam   Injectable 0.5 milliGRAM(s) IV Push every 8 hours PRN Anxiety  oxyCODONE    IR 10 milliGRAM(s) Oral three times a day PRN Severe Pain (7 - 10)    Pertinent Labs:  01-09 Na143 mmol/L Glu 97 mg/dL K+ 4.0 mmol/L Cr  0.71 mg/dL BUN 11 mg/dL Phos n/a   Alb n/a   PAB n/a         Estimated Needs:  1500- 1878 kcal/d ( 20 - 25 kcal/kg ) protein 90 gm/d ( 1.2 gm/kg admit wt. )  no change since previous assessment    Recommend Peptamen 1.5 @ 45 ml/hr 24hrs w/ no carb Pro source 1 pack daily        Monitoring and Evaluation:     1. Tolerance to diet prescription   2. weights  3. follow up per protocol

## 2018-01-09 NOTE — PROGRESS NOTE ADULT - NS NEC GEN PE MLT EXAM PC
detailed exam
No bruits; no thyromegaly or nodules
detailed exam
No bruits; no thyromegaly or nodules
detailed exam
detailed exam

## 2018-01-09 NOTE — PROGRESS NOTE ADULT - SKIN COMMENTS
See AAI flow sheet for skin details
ostomy bag over fistula
See AAI flow sheet for skin details
See AAI sheepts
ostomy appliance over fistula, serous drainage
See AAI flow sheet for skin details
See AAI flow sheet
See AAI flow sheet for skin details.
PER AAI
See AAI flow sheet for skin details
sacral pressure sores

## 2018-01-09 NOTE — PROGRESS NOTE ADULT - GUM GEN PE MLT EXAM PC
detailed exam
not examined
detailed exam
not examined
detailed exam
not examined
detailed exam
not examined
detailed exam
not examined
Normal genitalia; no lesions; no discharge
not examined
detailed exam
detailed exam
not examined
not examined

## 2018-01-09 NOTE — PROGRESS NOTE ADULT - CARDIOVASCULAR DETAILS
tachycardia
positive S2/positive S1
tachycardia/positive S1/positive S2
positive S2/positive S1
positive S2/positive S1
positive S1/positive S2
tachycardia
positive S2/positive S1
tachycardia
positive S2/positive S1
positive S1/positive S2
positive S1/positive S2
tachycardia
positive S1/positive S2
positive S2/positive S1
positive S2/positive S1
positive S2/positive S1/tachycardia
positive S1/tachycardia/positive S2
positive S2/positive S1
positive S1/positive S2
positive S2/tachycardia/positive S1
positive S2/positive S1/tachycardia

## 2018-01-09 NOTE — PROGRESS NOTE ADULT - CVS HE PE MLT D E PC
regular rate and rhythm
regular rate and rhythm
no rub/no murmur/regular rate and rhythm
regular rate and rhythm
regular rate and rhythm/no murmur/no rub
regular rate and rhythm
no murmur/regular rate and rhythm/no rub
regular rate and rhythm
no murmur/regular rate and rhythm
no murmur/regular rate and rhythm/no rub
regular rate and rhythm
no rub/regular rate and rhythm/no murmur
regular rate and rhythm

## 2018-01-09 NOTE — PROGRESS NOTE ADULT - ASSESSMENT
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 Currently on mechanical ventilation. Required trach exchange on 12/15 for cuff leak. Being treated for c.diff . 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 CT surgery and plastics following. Pt will go to rehab, then readdress the fistula.

## 2018-01-09 NOTE — PROGRESS NOTE ADULT - PSYCHIATRIC
detailed exam
Affect and characteristics of appearance, verbalizations, behaviors are appropriate
Affect and characteristics of appearance, verbalizations, behaviors are appropriate
detailed exam
Affect and characteristics of appearance, verbalizations, behaviors are appropriate
detailed exam
Affect and characteristics of appearance, verbalizations, behaviors are appropriate
detailed exam
Affect and characteristics of appearance, verbalizations, behaviors are appropriate
detailed exam
detailed exam

## 2018-01-09 NOTE — PROGRESS NOTE ADULT - GASTROINTESTINAL DETAILS
soft/nontender
normal
soft
soft/nontender
soft/nontender/no distention
soft/nontender
no distention/nontender/soft
normal/bowel sounds normal
soft/nontender
no distention/nontender/soft
soft/nontender
no distention/soft/nontender
nontender/no distention/soft
soft/nontender
normal
soft/nontender/no distention/bowel sounds normal/PEG with enteral feeding
soft/nontender/bowel sounds normal/PEG with enteral feeds
soft/nontender
nontender/soft
nontender/soft
soft/nontender
PEG with enteral feeds/bowel sounds normal/no distention/nontender/soft
soft/no distention/PEG with enteral feeds/bowel sounds normal/nontender
soft/nontender/bowel sounds normal/PEG to enteral feeds
normal
soft/bowel sounds normal/PEG with enteral feeds/nontender/no distention
normal
soft/nontender

## 2018-01-09 NOTE — PROGRESS NOTE ADULT - NEUROLOGICAL
detailed exam
Alert & oriented; no sensory, motor or coordination deficits, normal reflexes
detailed exam
Alert & oriented; no sensory, motor or coordination deficits, normal reflexes
detailed exam
Alert & oriented; no sensory, motor or coordination deficits, normal reflexes
Alert & oriented; no sensory, motor or coordination deficits, normal reflexes
detailed exam
detailed exam
Alert & oriented; no sensory, motor or coordination deficits, normal reflexes
detailed exam
Alert & oriented; no sensory, motor or coordination deficits, normal reflexes
detailed exam
detailed exam
Alert & oriented; no sensory, motor or coordination deficits, normal reflexes
detailed exam

## 2018-01-09 NOTE — PROGRESS NOTE ADULT - CONSTITUTIONAL
Well-developed, well nourished
detailed exam
Well-developed, well nourished
Well-developed, well nourished
detailed exam
Well-developed, well nourished
Well-developed, well nourished
detailed exam
Well-developed, well nourished
detailed exam
Well-developed, well nourished
detailed exam

## 2018-01-09 NOTE — PROGRESS NOTE ADULT - NS ABD PE RECTAL EXAM
not examined
detailed exam
not examined
detailed exam
not examined

## 2018-01-09 NOTE — PROGRESS NOTE ADULT - PROBLEM SELECTOR PROBLEM 8
Need for prophylactic measure

## 2018-01-09 NOTE — PROGRESS NOTE ADULT - RESPIRATORY COMMENTS
left side abscess
Trach to vent. Fistula draining air  and thick tan liquid to sbd
Trach to vent.
Trach to vent. Wean as tolerated
ostomy bag insitu over fistula area
Trach to vent.
+ ostomy appliance over fistula to mock
trach to vent
Trach to vent
Trach to vent. Weaned to trach collar. Tolerated for 2 hours.
TV increased
left chest wall open wound
Trach to vent
left chest swelling over pigtail site
trach to vent, moderate amt of beige secretions on suctioning
Trach to vent
Trach to vent
Trach to vent. High pip. Continue bronchodilators.
Trach to vent. Not tolerating weaning
trach to vent, daily weaning trials, minimal secreations. Left Bronchopleural fistula with small amt of drainage,  left bronchofistular squeak
Trach to vent. Short periods of CPAP tolerated
Trach to vent. Tolerating short periods of CPAP
Trach to vent. Attempted CPAP but TV dropped precipitously and RR increased.

## 2018-01-09 NOTE — PROGRESS NOTE ADULT - VASCULAR
Equal and normal pulses (carotid, femoral, dorsalis pedis)
detailed exam
Equal and normal pulses (carotid, femoral, dorsalis pedis)
detailed exam
Equal and normal pulses (carotid, femoral, dorsalis pedis)
detailed exam

## 2018-01-09 NOTE — PROGRESS NOTE ADULT - GASTROINTESTINAL COMMENTS
PEG with enteral feeds. Flexiseal with scant soft brown stool. Will d'c flexi
PEG with enteral feeds. Liquid stool x 3
PEG present
PEG with enteral feeds
PEG present
PEG with enteral feeds
PEg with enteral feeds. Flexiseal with watery stool.
PEG with enteral feeds
PEG with enteral feeds. Flexiseal with loose/liquidy stool.
PEG with enteral feeds. Flexiseal with loose/watery stool
PEG with enteral feeds. Flexiseal with watery stool
PEG present
PEG with enteral feeds. Flexiseal with loose watery stool.
PEG with enteral feeds
PEG present, flexiseal removed
PEG with enteral feeds
PEG present
PEG present
stools creamy in consistency
PEG with enteral feeds. Flexiseal with watery stool
PEG with enteral feeds. Still with loose stool, but frequency and amount decreased.

## 2018-01-09 NOTE — PROGRESS NOTE ADULT - SUBJECTIVE AND OBJECTIVE BOX
CHIEF COMPLAINT:    Interval Events:    REVIEW OF SYSTEMS:  Constitutional:   Eyes:  ENT:  CV:  Resp:  GI:  :  MSK:  Integumentary:  Neurological:  Psychiatric:  Endocrine:  Hematologic/Lymphatic:  Allergic/Immunologic:  [ ] All other systems negative  [ ] Unable to assess ROS because ________    OBJECTIVE:  ICU Vital Signs Last 24 Hrs  T(C): 37.1 (09 Jan 2018 12:19), Max: 37.2 (08 Jan 2018 17:03)  T(F): 98.7 (09 Jan 2018 12:19), Max: 98.9 (08 Jan 2018 17:03)  HR: 86 (09 Jan 2018 12:19) (81 - 108)  BP: 115/79 (09 Jan 2018 12:19) (109/74 - 117/80)  BP(mean): --  ABP: --  ABP(mean): --  RR: 18 (09 Jan 2018 12:19) (18 - 21)  SpO2: 100% (09 Jan 2018 12:19) (100% - 100%)    Mode: AC/ CMV (Assist Control/ Continuous Mandatory Ventilation), RR (machine): 12, TV (machine): 500, FiO2: 40, PEEP: 5, MAP: 12, PIP: 24    01-08 @ 07:01  -  01-09 @ 07:00  --------------------------------------------------------  IN: 150 mL / OUT: 800 mL / NET: -650 mL      CAPILLARY BLOOD GLUCOSE          PHYSICAL EXAM:  General:   HEENT:   Lymph Nodes:  Neck:   Respiratory:   Cardiovascular:   Abdomen:   Extremities:   Skin:   Neurological:  Psychiatry:    HOSPITAL MEDICATIONS:  MEDICATIONS  (STANDING):  ALBUTerol    90 MICROgram(s) HFA Inhaler 4 Puff(s) Inhalation every 6 hours  buDESOnide 160 MICROgram(s)/formoterol 4.5 MICROgram(s) Inhaler 2 Puff(s) Inhalation two times a day  chlorhexidine 4% Liquid 1 Application(s) Topical daily  enoxaparin Injectable 40 milliGRAM(s) SubCutaneous daily  fentaNYL   Patch  50 MICROgram(s)/Hr 1 Patch Transdermal every 72 hours  ferrous    sulfate Liquid 300 milliGRAM(s) Enteral Tube daily  folic acid 1 milliGRAM(s) Oral daily  ipratropium 17 MICROgram(s) HFA Inhaler 1 Puff(s) Inhalation every 6 hours  lactobacillus acidophilus 1 Tablet(s) Oral four times a day with meals  melatonin 3 milliGRAM(s) Oral at bedtime  pantoprazole  Injectable 40 milliGRAM(s) IV Push daily    MEDICATIONS  (PRN):  acetaminophen    Suspension. 650 milliGRAM(s) Enteral Tube every 6 hours PRN Mild Pain to moderate (1 - 6)  calamine Lotion 1 Application(s) Topical three times a day PRN Itching  LORazepam   Injectable 0.5 milliGRAM(s) IV Push every 8 hours PRN Anxiety  oxyCODONE    IR 10 milliGRAM(s) Oral three times a day PRN Severe Pain (7 - 10)      LABS:                        8.1    5.27  )-----------( 174      ( 09 Jan 2018 06:47 )             26.0     01-09    143  |  101  |  11  ----------------------------<  97  4.0   |  32<H>  |  0.71    Ca    9.3      09 Jan 2018 06:47      PT/INR - ( 08 Jan 2018 03:27 )   PT: 13.4 SEC;   INR: 1.20          PTT - ( 08 Jan 2018 03:27 )  PTT:26.6 SEC          MICROBIOLOGY:     RADIOLOGY:  [ ] Reviewed and interpreted by me    PULMONARY FUNCTION TESTS:    EKG: CHIEF COMPLAINT: Patient is a 47y old  Male who presents with a chief complaint of Patient is a 47y old  Male who presents with a chief complaint of hemoptysis (09 Nov 2017 00:32) (09 Dec 2017 16:29)    Interval Events: No acute events overnight    REVIEW OF SYSTEMS:  Constitutional: Feels ok  Eyes: No c/o  ENT: Denies c/o  CV: Denies CP  Resp: Denies SOB at rest, but MEYER+  GI: Denies GI distress  : No difficulty  MSK: Weak but improving.  Integumentary:  Neurological:  Psychiatric: Improved  Endocrine:  Hematologic/Lymphatic:  Allergic/Immunologic:  [x] All other systems negative  [ ] Unable to assess ROS because ________    OBJECTIVE:  ICU Vital Signs Last 24 Hrs  T(C): 37.1 (09 Jan 2018 12:19), Max: 37.2 (08 Jan 2018 17:03)  T(F): 98.7 (09 Jan 2018 12:19), Max: 98.9 (08 Jan 2018 17:03)  HR: 86 (09 Jan 2018 12:19) (81 - 108)  BP: 115/79 (09 Jan 2018 12:19) (109/74 - 117/80)  BP(mean): --  ABP: --  ABP(mean): --  RR: 18 (09 Jan 2018 12:19) (18 - 21)  SpO2: 100% (09 Jan 2018 12:19) (100% - 100%)    Mode: AC/ CMV (Assist Control/ Continuous Mandatory Ventilation), RR (machine): 12, TV (machine): 500, FiO2: 40, PEEP: 5, MAP: 12, PIP: 24    01-08 @ 07:01  -  01-09 @ 07:00  --------------------------------------------------------  IN: 150 mL / OUT: 800 mL / NET: -650 mL      CAPILLARY BLOOD GLUCOSE    HOSPITAL MEDICATIONS:  MEDICATIONS  (STANDING):  ALBUTerol    90 MICROgram(s) HFA Inhaler 4 Puff(s) Inhalation every 6 hours  buDESOnide 160 MICROgram(s)/formoterol 4.5 MICROgram(s) Inhaler 2 Puff(s) Inhalation two times a day  chlorhexidine 4% Liquid 1 Application(s) Topical daily  enoxaparin Injectable 40 milliGRAM(s) SubCutaneous daily  fentaNYL   Patch  50 MICROgram(s)/Hr 1 Patch Transdermal every 72 hours  ferrous    sulfate Liquid 300 milliGRAM(s) Enteral Tube daily  folic acid 1 milliGRAM(s) Oral daily  ipratropium 17 MICROgram(s) HFA Inhaler 1 Puff(s) Inhalation every 6 hours  lactobacillus acidophilus 1 Tablet(s) Oral four times a day with meals  melatonin 3 milliGRAM(s) Oral at bedtime  pantoprazole  Injectable 40 milliGRAM(s) IV Push daily    MEDICATIONS  (PRN):  acetaminophen    Suspension. 650 milliGRAM(s) Enteral Tube every 6 hours PRN Mild Pain to moderate (1 - 6)  calamine Lotion 1 Application(s) Topical three times a day PRN Itching  LORazepam   Injectable 0.5 milliGRAM(s) IV Push every 8 hours PRN Anxiety  oxyCODONE    IR 10 milliGRAM(s) Oral three times a day PRN Severe Pain (7 - 10)      LABS:                        8.1    5.27  )-----------( 174      ( 09 Jan 2018 06:47 )             26.0     01-09    143  |  101  |  11  ----------------------------<  97  4.0   |  32<H>  |  0.71    Ca    9.3      09 Jan 2018 06:47      PT/INR - ( 08 Jan 2018 03:27 )   PT: 13.4 SEC;   INR: 1.20          PTT - ( 08 Jan 2018 03:27 )  PTT:26.6 SEC          MICROBIOLOGY:     RADIOLOGY:  [ ] Reviewed and interpreted by me    PULMONARY FUNCTION TESTS:    EKG:

## 2018-01-09 NOTE — PROGRESS NOTE ADULT - RS GEN PE MLT RESP DETAILS PC
airway patent/breath sounds equal
wheezes/scattered but improving/airway patent
Inspiratory and Exp wheezes throughout. Very tight./wheezes/breath sounds equal/airway patent
Throughout. Insp/Exp/airway patent/breath sounds equal/wheezes
breath sounds equal/clear to auscultation bilaterally/airway patent
breath sounds equal/airway patent
Throughout/wheezes/airway patent/breath sounds equal
airway patent/breath sounds equal/wheezes
clear to auscultation bilaterally/breath sounds equal/airway patent
airway patent/breath sounds equal
breath sounds equal/wheezes
breath sounds equal/airway patent/wheezes/rhonchi
clear to auscultation bilaterally/airway patent/breath sounds equal
Scattered/wheezes
breath sounds equal/no wheezes/airway patent
Left BP fistula with drainage bag/good air movement/airway patent
clear to auscultation bilaterally/Trach to vent, daily weaning trials, minimal secretions on suction/respirations non-labored
respirations non-labored/trach to vent, daily weaning trials,  bronchopleural fistula, with bronchofisutular squeak on ausculatation. Fistula draining serous fluid
airway patent/breath sounds equal
breath sounds equal/respirations labored
breath sounds equal/airway patent
breath sounds equal/respirations non-labored/clear to auscultation bilaterally/Maintaining adequate tidal volumes
clear to auscultation bilaterally/respirations non-labored
good air movement/breath sounds equal/clear to auscultation bilaterally/respirations non-labored/airway patent
respirations non-labored/clear to auscultation bilaterally/Trach to vent, minimal dark secretions on suction
chest wall tenderness/wheezes/airway patent/breath sounds equal/tender at left chest wall opening
mild scattered/airway patent/wheezes/diminished breath sounds, L
airway patent/breath sounds equal/improving/wheezes
airway patent/breath sounds equal/wheezes/Insp/Exp wheezes throughout.
Throughout/wheezes/airway patent
diminished breath sounds, L/scattered mild wheezes/wheezes/airway patent
airway patent/breath sounds equal/wheezes/Insp/Exp wheezes throughout

## 2018-01-09 NOTE — PROGRESS NOTE ADULT - RESPIRATORY
detailed exam

## 2018-01-09 NOTE — PROGRESS NOTE ADULT - BACK
not examined
No deformity or limitation of movement
not examined

## 2018-01-09 NOTE — PROGRESS NOTE ADULT - PROBLEM SELECTOR PLAN 8
- lovenox

## 2018-01-10 VITALS
TEMPERATURE: 98 F | SYSTOLIC BLOOD PRESSURE: 112 MMHG | RESPIRATION RATE: 16 BRPM | OXYGEN SATURATION: 100 % | HEART RATE: 97 BPM | DIASTOLIC BLOOD PRESSURE: 90 MMHG

## 2018-01-10 PROCEDURE — 99232 SBSQ HOSP IP/OBS MODERATE 35: CPT | Mod: GC

## 2018-01-10 PROCEDURE — 99233 SBSQ HOSP IP/OBS HIGH 50: CPT | Mod: GC

## 2018-01-10 RX ORDER — FOLIC ACID 0.8 MG
1 TABLET ORAL
Qty: 0 | Refills: 0 | COMMUNITY
Start: 2018-01-10

## 2018-01-10 RX ORDER — LANOLIN ALCOHOL/MO/W.PET/CERES
1 CREAM (GRAM) TOPICAL
Qty: 0 | Refills: 0 | COMMUNITY
Start: 2018-01-10

## 2018-01-10 RX ORDER — LACTOBACILLUS ACIDOPHILUS 100MM CELL
1 CAPSULE ORAL
Qty: 0 | Refills: 0 | COMMUNITY
Start: 2018-01-10

## 2018-01-10 RX ORDER — FERROUS SULFATE 325(65) MG
5 TABLET ORAL
Qty: 0 | Refills: 0 | COMMUNITY
Start: 2018-01-10

## 2018-01-10 RX ORDER — BUDESONIDE AND FORMOTEROL FUMARATE DIHYDRATE 160; 4.5 UG/1; UG/1
2 AEROSOL RESPIRATORY (INHALATION)
Qty: 0 | Refills: 0 | COMMUNITY
Start: 2018-01-10

## 2018-01-10 RX ORDER — ALBUTEROL 90 UG/1
4 AEROSOL, METERED ORAL
Qty: 0 | Refills: 0 | COMMUNITY
Start: 2018-01-10

## 2018-01-10 RX ORDER — IPRATROPIUM BROMIDE 0.2 MG/ML
1 SOLUTION, NON-ORAL INHALATION
Qty: 0 | Refills: 0 | COMMUNITY
Start: 2018-01-10

## 2018-01-10 RX ORDER — PANTOPRAZOLE SODIUM 20 MG/1
1 TABLET, DELAYED RELEASE ORAL
Qty: 0 | Refills: 0 | COMMUNITY
Start: 2018-01-10

## 2018-01-10 RX ORDER — FENTANYL CITRATE 50 UG/ML
1 INJECTION INTRAVENOUS
Qty: 0 | Refills: 0 | COMMUNITY
Start: 2018-01-10

## 2018-01-10 RX ORDER — OXYCODONE HYDROCHLORIDE 5 MG/1
1 TABLET ORAL
Qty: 0 | Refills: 0 | COMMUNITY
Start: 2018-01-10

## 2018-01-10 RX ADMIN — CHLORHEXIDINE GLUCONATE 1 APPLICATION(S): 213 SOLUTION TOPICAL at 11:49

## 2018-01-10 RX ADMIN — Medication 1 PUFF(S): at 16:57

## 2018-01-10 RX ADMIN — Medication 1 MILLIGRAM(S): at 11:49

## 2018-01-10 RX ADMIN — Medication 300 MILLIGRAM(S): at 11:49

## 2018-01-10 RX ADMIN — ALBUTEROL 4 PUFF(S): 90 AEROSOL, METERED ORAL at 16:57

## 2018-01-10 RX ADMIN — Medication 1 PUFF(S): at 11:14

## 2018-01-10 RX ADMIN — ALBUTEROL 4 PUFF(S): 90 AEROSOL, METERED ORAL at 03:33

## 2018-01-10 RX ADMIN — ENOXAPARIN SODIUM 40 MILLIGRAM(S): 100 INJECTION SUBCUTANEOUS at 11:49

## 2018-01-10 RX ADMIN — Medication 1 TABLET(S): at 08:30

## 2018-01-10 RX ADMIN — Medication 1 TABLET(S): at 16:40

## 2018-01-10 RX ADMIN — Medication 1 TABLET(S): at 11:49

## 2018-01-10 RX ADMIN — PANTOPRAZOLE SODIUM 40 MILLIGRAM(S): 20 TABLET, DELAYED RELEASE ORAL at 11:50

## 2018-01-10 RX ADMIN — Medication 0.5 MILLIGRAM(S): at 16:40

## 2018-01-10 RX ADMIN — ALBUTEROL 4 PUFF(S): 90 AEROSOL, METERED ORAL at 11:13

## 2018-01-10 RX ADMIN — BUDESONIDE AND FORMOTEROL FUMARATE DIHYDRATE 2 PUFF(S): 160; 4.5 AEROSOL RESPIRATORY (INHALATION) at 11:15

## 2018-01-10 RX ADMIN — Medication 1 PUFF(S): at 03:33

## 2018-01-10 NOTE — PROGRESS NOTE ADULT - PROVIDER SPECIALTY LIST ADULT
Anesthesia
Anesthesia
CT Surgery
Critical Care
Gastroenterology
Infectious Disease
Intervent Radiology
MICU
Neurology
Plastic Surgery
Plastic Surgery
Pulmonology
Rehab Medicine
Thoracic Surgery
CT Surgery
Infectious Disease
MICU
Pulmonology
Pulmonology
Rehab Medicine
Cardiology
Infectious Disease
Infectious Disease
MICU
MICU
Pulmonology

## 2018-01-10 NOTE — PROGRESS NOTE ADULT - I WAS PHYSICALLY PRESENT FOR THE KEY PORTIONS OF THE EVALUATION AND MANAGEMENT (E/M) SERVICE PROVIDED.  I AGREE WITH THE ABOVE HISTORY, PHYSICAL, AND PLAN WHICH I HAVE REVIEWED AND EDITED WHERE APPROPRIATE

## 2018-01-10 NOTE — PROGRESS NOTE ADULT - SUBJECTIVE AND OBJECTIVE BOX
Seen and examined in chair. Doing well, no complaints. On trach collar.   Going to rehab today.           CURRENT FUNCTIONAL STATUS mod a       REVIEW OF SYSTEMS  No chest pain, no n/d/d.         RECENT LABS/IMAGING  CBC Full  -  ( 09 Jan 2018 06:47 )  WBC Count : 5.27 K/uL  Hemoglobin : 8.1 g/dL  Hematocrit : 26.0 %  Platelet Count - Automated : 174 K/uL  Mean Cell Volume : 92.2 fL  Mean Cell Hemoglobin : 28.7 pg  Mean Cell Hemoglobin Concentration : 31.2 %  Auto Neutrophil # : x  Auto Lymphocyte # : x  Auto Monocyte # : x  Auto Eosinophil # : x  Auto Basophil # : x  Auto Neutrophil % : x  Auto Lymphocyte % : x  Auto Monocyte % : x  Auto Eosinophil % : x  Auto Basophil % : x    01-09    143  |  101  |  11  ----------------------------<  97  4.0   |  32<H>  |  0.71    Ca    9.3      09 Jan 2018 06:47          VITALS  T(C): 36.9 (01-10-18 @ 13:26), Max: 37.1 (01-09-18 @ 16:57)  HR: 102 (01-10-18 @ 13:29) (81 - 108)  BP: 128/83 (01-10-18 @ 13:26) (105/78 - 137/98)  RR: 16 (01-10-18 @ 13:26) (15 - 20)  SpO2: 100% (01-10-18 @ 13:29) (100% - 100%)  Wt(kg): --    MEDICATIONS   acetaminophen    Suspension. 650 milliGRAM(s) every 6 hours PRN  ALBUTerol    90 MICROgram(s) HFA Inhaler 4 Puff(s) every 6 hours  buDESOnide 160 MICROgram(s)/formoterol 4.5 MICROgram(s) Inhaler 2 Puff(s) two times a day  calamine Lotion 1 Application(s) three times a day PRN  chlorhexidine 4% Liquid 1 Application(s) daily  enoxaparin Injectable 40 milliGRAM(s) daily  fentaNYL   Patch  50 MICROgram(s)/Hr 1 Patch every 72 hours  ferrous    sulfate Liquid 300 milliGRAM(s) daily  folic acid 1 milliGRAM(s) daily  ipratropium 17 MICROgram(s) HFA Inhaler 1 Puff(s) every 6 hours  lactobacillus acidophilus 1 Tablet(s) four times a day with meals  LORazepam   Injectable 0.5 milliGRAM(s) every 8 hours PRN  melatonin 3 milliGRAM(s) at bedtime  oxyCODONE    IR 10 milliGRAM(s) three times a day PRN  pantoprazole  Injectable 40 milliGRAM(s) daily      PHYSICAL EXAM  Constitutional - NAD, Comfortable  HEENT -+ trach    Neck - Supple, No limited ROM  Chest - CTA bilaterally, No wheeze, No rhonchi, No crackles  Cardiovascular - RRR, S1S2, No murmurs  Abdomen -+ PEG   Extremities - No C/C/E, No calf tenderness   Neurologic Exam -                 hemiparesis         Balance - not tested   Psychiatric - Mood stable, Affect WNL    --------------------------------------------------------------------

## 2018-01-10 NOTE — PROGRESS NOTE ADULT - SUBJECTIVE AND OBJECTIVE BOX
CHIEF COMPLAINT:    Interval Events:    REVIEW OF SYSTEMS:  Constitutional:   Eyes:  ENT:  CV:  Resp:  GI:  :  MSK:  Integumentary:  Neurological:  Psychiatric:  Endocrine:  Hematologic/Lymphatic:  Allergic/Immunologic:  [ ] All other systems negative  [ ] Unable to assess ROS because ________    OBJECTIVE:  ICU Vital Signs Last 24 Hrs  T(C): 36.7 (10 [place] 2018 10:00), Max: 37.1 (09 Jan 2018 12:19)  T(F): 98 (10 [place] 2018 10:00), Max: 98.8 (09 Jan 2018 16:57)  HR: 90 (10 [place] 2018 10:00) (81 - 104)  BP: 115/83 (10 [place] 2018 10:00) (105/78 - 137/98)  BP(mean): --  ABP: --  ABP(mean): --  RR: 15 (10 [place] 2018 10:00) (15 - 19)  SpO2: 100% (10 [place] 2018 10:00) (100% - 100%)    Mode: standby    01-09 @ 07:01  -  01-10 @ 07:00  --------------------------------------------------------  IN: 300 mL / OUT: 600 mL / NET: -300 mL      CAPILLARY BLOOD GLUCOSE          PHYSICAL EXAM:  General:   HEENT:   Lymph Nodes:  Neck:   Respiratory:   Cardiovascular:   Abdomen:   Extremities:   Skin:   Neurological:  Psychiatry:    HOSPITAL MEDICATIONS:  MEDICATIONS  (STANDING):  ALBUTerol    90 MICROgram(s) HFA Inhaler 4 Puff(s) Inhalation every 6 hours  buDESOnide 160 MICROgram(s)/formoterol 4.5 MICROgram(s) Inhaler 2 Puff(s) Inhalation two times a day  chlorhexidine 4% Liquid 1 Application(s) Topical daily  enoxaparin Injectable 40 milliGRAM(s) SubCutaneous daily  fentaNYL   Patch  50 MICROgram(s)/Hr 1 Patch Transdermal every 72 hours  ferrous    sulfate Liquid 300 milliGRAM(s) Enteral Tube daily  folic acid 1 milliGRAM(s) Oral daily  ipratropium 17 MICROgram(s) HFA Inhaler 1 Puff(s) Inhalation every 6 hours  lactobacillus acidophilus 1 Tablet(s) Oral four times a day with meals  melatonin 3 milliGRAM(s) Oral at bedtime  pantoprazole  Injectable 40 milliGRAM(s) IV Push daily    MEDICATIONS  (PRN):  acetaminophen    Suspension. 650 milliGRAM(s) Enteral Tube every 6 hours PRN Mild Pain to moderate (1 - 6)  calamine Lotion 1 Application(s) Topical three times a day PRN Itching  LORazepam   Injectable 0.5 milliGRAM(s) IV Push every 8 hours PRN Anxiety  oxyCODONE    IR 10 milliGRAM(s) Oral three times a day PRN Severe Pain (7 - 10)      LABS:                        8.1    5.27  )-----------( 174      ( 09 Jan 2018 06:47 )             26.0     01-09    143  |  101  |  11  ----------------------------<  97  4.0   |  32<H>  |  0.71    Ca    9.3      09 Jan 2018 06:47                MICROBIOLOGY:     RADIOLOGY:  [ ] Reviewed and interpreted by me    PULMONARY FUNCTION TESTS:    EKG:

## 2018-01-10 NOTE — PROGRESS NOTE ADULT - ATTENDING COMMENTS
On trach collar again today  hemodyn stable. afebrile.  stable for d/c to vent rehab facility  follow up with thoracic surgery in 4 weeks

## 2018-02-09 ENCOUNTER — INPATIENT (INPATIENT)
Facility: HOSPITAL | Age: 48
LOS: 17 days | Discharge: HOME HEALTH | End: 2018-02-27
Attending: INTERNAL MEDICINE | Admitting: INTERNAL MEDICINE
Payer: MEDICAID

## 2018-02-09 VITALS — HEART RATE: 116 BPM | OXYGEN SATURATION: 98 %

## 2018-02-09 DIAGNOSIS — A41.9 SEPSIS, UNSPECIFIED ORGANISM: ICD-10-CM

## 2018-02-09 DIAGNOSIS — I63.9 CEREBRAL INFARCTION, UNSPECIFIED: ICD-10-CM

## 2018-02-09 DIAGNOSIS — R06.03 ACUTE RESPIRATORY DISTRESS: ICD-10-CM

## 2018-02-09 DIAGNOSIS — J44.9 CHRONIC OBSTRUCTIVE PULMONARY DISEASE, UNSPECIFIED: ICD-10-CM

## 2018-02-09 DIAGNOSIS — G89.29 OTHER CHRONIC PAIN: ICD-10-CM

## 2018-02-09 DIAGNOSIS — D86.9 SARCOIDOSIS, UNSPECIFIED: ICD-10-CM

## 2018-02-09 DIAGNOSIS — I47.1 SUPRAVENTRICULAR TACHYCARDIA: ICD-10-CM

## 2018-02-09 DIAGNOSIS — J18.9 PNEUMONIA, UNSPECIFIED ORGANISM: ICD-10-CM

## 2018-02-09 DIAGNOSIS — F41.9 ANXIETY DISORDER, UNSPECIFIED: ICD-10-CM

## 2018-02-09 DIAGNOSIS — Z98.890 OTHER SPECIFIED POSTPROCEDURAL STATES: Chronic | ICD-10-CM

## 2018-02-09 LAB
ALBUMIN SERPL ELPH-MCNC: 3.1 G/DL — LOW (ref 3.3–5)
ALP SERPL-CCNC: 104 U/L — SIGNIFICANT CHANGE UP (ref 40–120)
ALT FLD-CCNC: 30 U/L — SIGNIFICANT CHANGE UP (ref 4–41)
APPEARANCE UR: SIGNIFICANT CHANGE UP
APTT BLD: 30.7 SEC — SIGNIFICANT CHANGE UP (ref 27.5–37.4)
AST SERPL-CCNC: 66 U/L — HIGH (ref 4–40)
B PERT DNA SPEC QL NAA+PROBE: SIGNIFICANT CHANGE UP
BASE EXCESS BLDV CALC-SCNC: 13.4 MMOL/L — SIGNIFICANT CHANGE UP
BASOPHILS # BLD AUTO: 0.02 K/UL — SIGNIFICANT CHANGE UP (ref 0–0.2)
BASOPHILS NFR BLD AUTO: 0.1 % — SIGNIFICANT CHANGE UP (ref 0–2)
BILIRUB SERPL-MCNC: 0.3 MG/DL — SIGNIFICANT CHANGE UP (ref 0.2–1.2)
BILIRUB UR-MCNC: NEGATIVE — SIGNIFICANT CHANGE UP
BLOOD GAS VENOUS - CREATININE: 0.6 MG/DL — SIGNIFICANT CHANGE UP (ref 0.5–1.3)
BLOOD UR QL VISUAL: HIGH
BUN SERPL-MCNC: 15 MG/DL — SIGNIFICANT CHANGE UP (ref 7–23)
C PNEUM DNA SPEC QL NAA+PROBE: NOT DETECTED — SIGNIFICANT CHANGE UP
CALCIUM SERPL-MCNC: 9.5 MG/DL — SIGNIFICANT CHANGE UP (ref 8.4–10.5)
CHLORIDE BLDV-SCNC: 92 MMOL/L — LOW (ref 96–108)
CHLORIDE SERPL-SCNC: 88 MMOL/L — LOW (ref 98–107)
CK MB BLD-MCNC: 1.8 NG/ML — SIGNIFICANT CHANGE UP (ref 1–6.6)
CK MB BLD-MCNC: SIGNIFICANT CHANGE UP (ref 0–2.5)
CK SERPL-CCNC: 120 U/L — SIGNIFICANT CHANGE UP (ref 30–200)
CO2 SERPL-SCNC: 36 MMOL/L — HIGH (ref 22–31)
COLOR SPEC: YELLOW — SIGNIFICANT CHANGE UP
CREAT SERPL-MCNC: 0.66 MG/DL — SIGNIFICANT CHANGE UP (ref 0.5–1.3)
EOSINOPHIL # BLD AUTO: 0 K/UL — SIGNIFICANT CHANGE UP (ref 0–0.5)
EOSINOPHIL NFR BLD AUTO: 0 % — SIGNIFICANT CHANGE UP (ref 0–6)
FLUAV H1 2009 PAND RNA SPEC QL NAA+PROBE: NOT DETECTED — SIGNIFICANT CHANGE UP
FLUAV H1 RNA SPEC QL NAA+PROBE: NOT DETECTED — SIGNIFICANT CHANGE UP
FLUAV H3 RNA SPEC QL NAA+PROBE: NOT DETECTED — SIGNIFICANT CHANGE UP
FLUAV SUBTYP SPEC NAA+PROBE: SIGNIFICANT CHANGE UP
FLUBV RNA SPEC QL NAA+PROBE: NOT DETECTED — SIGNIFICANT CHANGE UP
GAS PNL BLDV: 133 MMOL/L — LOW (ref 136–146)
GLUCOSE BLDV-MCNC: 145 — HIGH (ref 70–99)
GLUCOSE SERPL-MCNC: 122 MG/DL — HIGH (ref 70–99)
GLUCOSE UR-MCNC: NEGATIVE — SIGNIFICANT CHANGE UP
HADV DNA SPEC QL NAA+PROBE: NOT DETECTED — SIGNIFICANT CHANGE UP
HCO3 BLDV-SCNC: 35 MMOL/L — HIGH (ref 20–27)
HCOV 229E RNA SPEC QL NAA+PROBE: NOT DETECTED — SIGNIFICANT CHANGE UP
HCOV HKU1 RNA SPEC QL NAA+PROBE: NOT DETECTED — SIGNIFICANT CHANGE UP
HCOV NL63 RNA SPEC QL NAA+PROBE: NOT DETECTED — SIGNIFICANT CHANGE UP
HCOV OC43 RNA SPEC QL NAA+PROBE: NOT DETECTED — SIGNIFICANT CHANGE UP
HCT VFR BLD CALC: 29.5 % — LOW (ref 39–50)
HCT VFR BLDV CALC: 29.6 % — LOW (ref 39–51)
HGB BLD-MCNC: 8.8 G/DL — LOW (ref 13–17)
HGB BLDV-MCNC: 9.6 G/DL — LOW (ref 13–17)
HMPV RNA SPEC QL NAA+PROBE: NOT DETECTED — SIGNIFICANT CHANGE UP
HPIV1 RNA SPEC QL NAA+PROBE: NOT DETECTED — SIGNIFICANT CHANGE UP
HPIV2 RNA SPEC QL NAA+PROBE: NOT DETECTED — SIGNIFICANT CHANGE UP
HPIV3 RNA SPEC QL NAA+PROBE: NOT DETECTED — SIGNIFICANT CHANGE UP
HPIV4 RNA SPEC QL NAA+PROBE: NOT DETECTED — SIGNIFICANT CHANGE UP
HYALINE CASTS # UR AUTO: SIGNIFICANT CHANGE UP (ref 0–?)
IMM GRANULOCYTES # BLD AUTO: 0.12 # — SIGNIFICANT CHANGE UP
IMM GRANULOCYTES NFR BLD AUTO: 0.6 % — SIGNIFICANT CHANGE UP (ref 0–1.5)
INR BLD: 1.28 — HIGH (ref 0.88–1.17)
KETONES UR-MCNC: NEGATIVE — SIGNIFICANT CHANGE UP
LACTATE BLDV-MCNC: 1.5 MMOL/L — SIGNIFICANT CHANGE UP (ref 0.5–2)
LEUKOCYTE ESTERASE UR-ACNC: NEGATIVE — SIGNIFICANT CHANGE UP
LYMPHOCYTES # BLD AUTO: 1.44 K/UL — SIGNIFICANT CHANGE UP (ref 1–3.3)
LYMPHOCYTES # BLD AUTO: 7.2 % — LOW (ref 13–44)
M PNEUMO DNA SPEC QL NAA+PROBE: NOT DETECTED — SIGNIFICANT CHANGE UP
MAGNESIUM SERPL-MCNC: 1.9 MG/DL — SIGNIFICANT CHANGE UP (ref 1.6–2.6)
MCHC RBC-ENTMCNC: 27.4 PG — SIGNIFICANT CHANGE UP (ref 27–34)
MCHC RBC-ENTMCNC: 29.8 % — LOW (ref 32–36)
MCV RBC AUTO: 91.9 FL — SIGNIFICANT CHANGE UP (ref 80–100)
MONOCYTES # BLD AUTO: 1.12 K/UL — HIGH (ref 0–0.9)
MONOCYTES NFR BLD AUTO: 5.6 % — SIGNIFICANT CHANGE UP (ref 2–14)
MUCOUS THREADS # UR AUTO: SIGNIFICANT CHANGE UP
NEUTROPHILS # BLD AUTO: 17.17 K/UL — HIGH (ref 1.8–7.4)
NEUTROPHILS NFR BLD AUTO: 86.5 % — HIGH (ref 43–77)
NITRITE UR-MCNC: NEGATIVE — SIGNIFICANT CHANGE UP
NRBC # FLD: 0 — SIGNIFICANT CHANGE UP
PCO2 BLDV: 92 MMHG — CRITICAL HIGH (ref 41–51)
PH BLDV: 7.27 PH — LOW (ref 7.32–7.43)
PH UR: 5.5 — SIGNIFICANT CHANGE UP (ref 4.6–8)
PHOSPHATE SERPL-MCNC: 4.4 MG/DL — SIGNIFICANT CHANGE UP (ref 2.5–4.5)
PLATELET # BLD AUTO: 206 K/UL — SIGNIFICANT CHANGE UP (ref 150–400)
PMV BLD: 13.5 FL — HIGH (ref 7–13)
PO2 BLDV: 52 MMHG — HIGH (ref 35–40)
POTASSIUM BLDV-SCNC: 4.7 MMOL/L — HIGH (ref 3.4–4.5)
POTASSIUM SERPL-MCNC: SIGNIFICANT CHANGE UP MMOL/L (ref 3.5–5.3)
POTASSIUM SERPL-SCNC: SIGNIFICANT CHANGE UP MMOL/L (ref 3.5–5.3)
PROT SERPL-MCNC: 8.5 G/DL — HIGH (ref 6–8.3)
PROT UR-MCNC: 100 MG/DL — SIGNIFICANT CHANGE UP
PROTHROM AB SERPL-ACNC: 14.8 SEC — HIGH (ref 9.8–13.1)
RBC # BLD: 3.21 M/UL — LOW (ref 4.2–5.8)
RBC # FLD: 13 % — SIGNIFICANT CHANGE UP (ref 10.3–14.5)
RBC CASTS # UR COMP ASSIST: >50 — HIGH (ref 0–?)
RSV RNA SPEC QL NAA+PROBE: NOT DETECTED — SIGNIFICANT CHANGE UP
RV+EV RNA SPEC QL NAA+PROBE: NOT DETECTED — SIGNIFICANT CHANGE UP
SAO2 % BLDV: 79.1 % — SIGNIFICANT CHANGE UP (ref 60–85)
SODIUM SERPL-SCNC: 132 MMOL/L — LOW (ref 135–145)
SP GR SPEC: 1.03 — SIGNIFICANT CHANGE UP (ref 1–1.04)
SQUAMOUS # UR AUTO: SIGNIFICANT CHANGE UP
TROPONIN T SERPL-MCNC: < 0.06 NG/ML — SIGNIFICANT CHANGE UP (ref 0–0.06)
TSH SERPL-MCNC: 1.51 UIU/ML — SIGNIFICANT CHANGE UP (ref 0.27–4.2)
UROBILINOGEN FLD QL: NORMAL MG/DL — SIGNIFICANT CHANGE UP
WBC # BLD: 19.87 K/UL — HIGH (ref 3.8–10.5)
WBC # FLD AUTO: 19.87 K/UL — HIGH (ref 3.8–10.5)
WBC UR QL: SIGNIFICANT CHANGE UP (ref 0–?)

## 2018-02-09 PROCEDURE — 99291 CRITICAL CARE FIRST HOUR: CPT

## 2018-02-09 PROCEDURE — 71045 X-RAY EXAM CHEST 1 VIEW: CPT | Mod: 26

## 2018-02-09 RX ORDER — AZITHROMYCIN 500 MG/1
500 TABLET, FILM COATED ORAL EVERY 24 HOURS
Qty: 0 | Refills: 0 | Status: DISCONTINUED | OUTPATIENT
Start: 2018-02-10 | End: 2018-02-10

## 2018-02-09 RX ORDER — PIPERACILLIN AND TAZOBACTAM 4; .5 G/20ML; G/20ML
3.38 INJECTION, POWDER, LYOPHILIZED, FOR SOLUTION INTRAVENOUS ONCE
Qty: 0 | Refills: 0 | Status: COMPLETED | OUTPATIENT
Start: 2018-02-09 | End: 2018-02-09

## 2018-02-09 RX ORDER — PANTOPRAZOLE SODIUM 20 MG/1
40 TABLET, DELAYED RELEASE ORAL DAILY
Qty: 0 | Refills: 0 | Status: DISCONTINUED | OUTPATIENT
Start: 2018-02-09 | End: 2018-02-24

## 2018-02-09 RX ORDER — ACETAMINOPHEN 500 MG
650 TABLET ORAL EVERY 6 HOURS
Qty: 0 | Refills: 0 | Status: DISCONTINUED | OUTPATIENT
Start: 2018-02-09 | End: 2018-02-10

## 2018-02-09 RX ORDER — SODIUM CHLORIDE 9 MG/ML
2000 INJECTION INTRAMUSCULAR; INTRAVENOUS; SUBCUTANEOUS ONCE
Qty: 0 | Refills: 0 | Status: COMPLETED | OUTPATIENT
Start: 2018-02-09 | End: 2018-02-09

## 2018-02-09 RX ORDER — SENNA PLUS 8.6 MG/1
10 TABLET ORAL AT BEDTIME
Qty: 0 | Refills: 0 | Status: DISCONTINUED | OUTPATIENT
Start: 2018-02-09 | End: 2018-02-17

## 2018-02-09 RX ORDER — ENOXAPARIN SODIUM 100 MG/ML
40 INJECTION SUBCUTANEOUS DAILY
Qty: 0 | Refills: 0 | Status: DISCONTINUED | OUTPATIENT
Start: 2018-02-09 | End: 2018-02-27

## 2018-02-09 RX ORDER — SODIUM CHLORIDE 9 MG/ML
1000 INJECTION INTRAMUSCULAR; INTRAVENOUS; SUBCUTANEOUS
Qty: 0 | Refills: 0 | Status: DISCONTINUED | OUTPATIENT
Start: 2018-02-09 | End: 2018-02-10

## 2018-02-09 RX ORDER — OXYCODONE HYDROCHLORIDE 5 MG/1
10 TABLET ORAL EVERY 8 HOURS
Qty: 0 | Refills: 0 | Status: DISCONTINUED | OUTPATIENT
Start: 2018-02-09 | End: 2018-02-16

## 2018-02-09 RX ORDER — CALAMINE AND ZINC OXIDE AND PHENOL 160; 10 MG/ML; MG/ML
1 LOTION TOPICAL EVERY 4 HOURS
Qty: 0 | Refills: 0 | Status: DISCONTINUED | OUTPATIENT
Start: 2018-02-09 | End: 2018-02-27

## 2018-02-09 RX ORDER — IPRATROPIUM/ALBUTEROL SULFATE 18-103MCG
3 AEROSOL WITH ADAPTER (GRAM) INHALATION EVERY 6 HOURS
Qty: 0 | Refills: 0 | Status: DISCONTINUED | OUTPATIENT
Start: 2018-02-09 | End: 2018-02-10

## 2018-02-09 RX ORDER — ADENOSINE 3 MG/ML
12 INJECTION INTRAVENOUS ONCE
Qty: 0 | Refills: 0 | Status: COMPLETED | OUTPATIENT
Start: 2018-02-09 | End: 2018-02-09

## 2018-02-09 RX ORDER — AZITHROMYCIN 500 MG/1
500 TABLET, FILM COATED ORAL ONCE
Qty: 0 | Refills: 0 | Status: COMPLETED | OUTPATIENT
Start: 2018-02-09 | End: 2018-02-09

## 2018-02-09 RX ORDER — FENTANYL CITRATE 50 UG/ML
1 INJECTION INTRAVENOUS
Qty: 0 | Refills: 0 | Status: DISCONTINUED | OUTPATIENT
Start: 2018-02-09 | End: 2018-02-15

## 2018-02-09 RX ORDER — AZITHROMYCIN 500 MG/1
TABLET, FILM COATED ORAL
Qty: 0 | Refills: 0 | Status: DISCONTINUED | OUTPATIENT
Start: 2018-02-09 | End: 2018-02-10

## 2018-02-09 RX ORDER — BUDESONIDE, MICRONIZED 100 %
0.25 POWDER (GRAM) MISCELLANEOUS EVERY 12 HOURS
Qty: 0 | Refills: 0 | Status: DISCONTINUED | OUTPATIENT
Start: 2018-02-09 | End: 2018-02-10

## 2018-02-09 RX ORDER — PIPERACILLIN AND TAZOBACTAM 4; .5 G/20ML; G/20ML
3.38 INJECTION, POWDER, LYOPHILIZED, FOR SOLUTION INTRAVENOUS EVERY 8 HOURS
Qty: 0 | Refills: 0 | Status: DISCONTINUED | OUTPATIENT
Start: 2018-02-09 | End: 2018-02-12

## 2018-02-09 RX ORDER — VANCOMYCIN HCL 1 G
1000 VIAL (EA) INTRAVENOUS EVERY 12 HOURS
Qty: 0 | Refills: 0 | Status: DISCONTINUED | OUTPATIENT
Start: 2018-02-10 | End: 2018-02-10

## 2018-02-09 RX ORDER — DOCUSATE SODIUM 100 MG
10 CAPSULE ORAL DAILY
Qty: 0 | Refills: 0 | Status: DISCONTINUED | OUTPATIENT
Start: 2018-02-09 | End: 2018-02-22

## 2018-02-09 RX ORDER — LACTOBACILLUS ACIDOPHILUS 100MM CELL
1 CAPSULE ORAL
Qty: 0 | Refills: 0 | Status: DISCONTINUED | OUTPATIENT
Start: 2018-02-09 | End: 2018-02-27

## 2018-02-09 RX ORDER — VANCOMYCIN HCL 1 G
1000 VIAL (EA) INTRAVENOUS ONCE
Qty: 0 | Refills: 0 | Status: COMPLETED | OUTPATIENT
Start: 2018-02-09 | End: 2018-02-09

## 2018-02-09 RX ORDER — FOLIC ACID 0.8 MG
1 TABLET ORAL DAILY
Qty: 0 | Refills: 0 | Status: DISCONTINUED | OUTPATIENT
Start: 2018-02-09 | End: 2018-02-27

## 2018-02-09 RX ORDER — VANCOMYCIN HCL 1 G
1000 VIAL (EA) INTRAVENOUS ONCE
Qty: 0 | Refills: 0 | Status: DISCONTINUED | OUTPATIENT
Start: 2018-02-09 | End: 2018-02-09

## 2018-02-09 RX ADMIN — PIPERACILLIN AND TAZOBACTAM 200 GRAM(S): 4; .5 INJECTION, POWDER, LYOPHILIZED, FOR SOLUTION INTRAVENOUS at 18:46

## 2018-02-09 RX ADMIN — Medication 40 MILLIGRAM(S): at 22:47

## 2018-02-09 RX ADMIN — ADENOSINE 12 MILLIGRAM(S): 3 INJECTION INTRAVENOUS at 17:30

## 2018-02-09 RX ADMIN — AZITHROMYCIN 250 MILLIGRAM(S): 500 TABLET, FILM COATED ORAL at 20:52

## 2018-02-09 RX ADMIN — OXYCODONE HYDROCHLORIDE 10 MILLIGRAM(S): 5 TABLET ORAL at 23:36

## 2018-02-09 RX ADMIN — SODIUM CHLORIDE 100 MILLILITER(S): 9 INJECTION INTRAMUSCULAR; INTRAVENOUS; SUBCUTANEOUS at 20:52

## 2018-02-09 RX ADMIN — FENTANYL CITRATE 1 PATCH: 50 INJECTION INTRAVENOUS at 22:48

## 2018-02-09 RX ADMIN — SODIUM CHLORIDE 2000 MILLILITER(S): 9 INJECTION INTRAMUSCULAR; INTRAVENOUS; SUBCUTANEOUS at 17:57

## 2018-02-09 RX ADMIN — Medication 1 MILLIGRAM(S): at 23:22

## 2018-02-09 RX ADMIN — Medication 250 MILLIGRAM(S): at 19:30

## 2018-02-09 RX ADMIN — ADENOSINE 12 MILLIGRAM(S): 3 INJECTION INTRAVENOUS at 17:35

## 2018-02-09 NOTE — H&P ADULT - PROBLEM SELECTOR PLAN 5
Suspected R middle lobe opacity on CXR. Likely etiology of sepsis pt presented with and possibly source of current acute hypercapnic respiratory distress.   received empiric Cefepime @ [facility]   - will cover broadly w/ Vanco, Zosyn and Azithromycin for now   - f/up blood cultures , urine culture, urine legionella, and RVP   - duonebs ATC   - c/w AC/CMV mode overnight; can attempt CPAP trial tmw if respiratory status improves

## 2018-02-09 NOTE — H&P ADULT - PROBLEM SELECTOR PLAN 7
Pain well controlled on current regimen at [facility]   - c/w Fentanyl patch 12mcg/72hr (has been gradually tapered down at [facility])  - c/w oxycodone 10mg PO q8h PRN for breakthrough pain   - bowel regimen

## 2018-02-09 NOTE — H&P ADULT - NSHPPHYSICALEXAM_GEN_ALL_CORE
GENERAL: mild respiratory distress, otherwise appears comfortable , lying in bed   HEAD:  Atraumatic, Normocephalic  ENT: EOMI, PERRLA, conjunctiva and sclera clear, Neck supple, No JVD, moist mucosa, tracheostomy intact, no active leakage around trach site or air leak   CHEST/LUNG: Course rhonchi diffusely, transmitted upper airways sounds, no wheezing/crackles   HEART: tachycardic, no m/r/g   ABDOMEN: Soft, Nontender, Nondistended; +BS ; PEG site intact, no surrounding erythema/drainage   EXTREMITIES:  No edema  PSYCH: Nl behavior, nl affect  NEUROLOGY: AAOx3, non-focal  SKIN: Normal color, warm/dry extremities

## 2018-02-09 NOTE — H&P ADULT - PROBLEM SELECTOR PLAN 9
remote history of CVA w/ left sided weakness. Pt is ambulatory w/ assistance at baseline. PO via PEG and by mouth at [facility]. (per pt and family, passed s/s eval there).  - will keep NPO for now   - can give feeds via PEG  - s/s eval once acute illness and respiratory status improved     DVT ppx: Lovenox SQ   GI ppx: PPI 40 via PEG

## 2018-02-09 NOTE — ED PROVIDER NOTE - ATTENDING CONTRIBUTION TO CARE
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  Patient is s/p DAVID resection and has a h/o recurrent  hemoptysis and respiratory failure s/p trach and PEG.  Patient has difficulty speaking but can answer yes/no questions and is alert and oriented.  DEVIN BORREGO, ATTENDING NOTE:  Patient is awake and alert and in mild resiprtory distress.  Diaphoretic.  Normocephalic/atraumatic.  Auricles are normal.  Neck trachostomy on ventilator.  Lungs course BS B/L with decrease A/E, L>R.  Heart is regular tachycardia in 150 bpm range.  Abdomen is soft, PEG c/d/i, not distended +BS.  Back is nontender, no CVAT.  Moving all extremities.  DR. BORREGO, ATTENDING MD-  I performed a face to face bedside interview with patient regarding history of present illness, review of symptoms and past medical history. I completed an independent physical exam.  I have discussed patient's plan of care with the resident.   I agree with note as stated above, having amended the EMR as needed to reflect my findings. I have discussed the assessment and plan of care.  This includes during the time I functioned as the attending physician for this patient.

## 2018-02-09 NOTE — H&P ADULT - PROBLEM SELECTOR PLAN 1
Pt meeting sepsis criteria on admission likely source is HCAP. Lactate normal.  HD stable.   - c/w broad spectrum antibiotics   - monitor for fevers/white count  - f/up culture and RVP

## 2018-02-09 NOTE — ED PROVIDER NOTE - OBJECTIVE STATEMENT
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

## 2018-02-09 NOTE — H&P ADULT - PROBLEM SELECTOR PLAN 2
Possibly multifactorial 2/2 HCAP and superimposed COPD exaccerbation   - will c/w AC/CMV ventilator mode  - check ABG in AM to assess for CO retention   - c/w frequent suctioning, duonebs ATC   - keep NPO for now  - admit to RCU

## 2018-02-09 NOTE — H&P ADULT - HISTORY OF PRESENT ILLNESS
47M with h/o HTN, PFO,sarcoidosis c/b aspergilosis (s/p Voriconazole course in 2017), asthma/COPD, s/p CVA w/ L hemiplegia ((2017), pAfib (not on A/C 2/2 recurrent hemoptysis), s/p emergency L upper lobectomy for hemoptysis requiring IR emoblization of L bronchial artery (2017), s/p left upper lobe resection (2017) complicated by pneumothorax requiring chest tube, s/p tracheostomy (17) for acute hypoxic respiratory failure and PEG placement (18), broncho-pulmonary fistula, thyroid arterial pesuodaneyrusm and recent C.diff colitis (2018 s/p course of vanco/flagyl via PEG)  presenting from acute nursing facility for acute on chronic respiratory failure, tachycardia and fevers x 1 day. Of note, collateral history obtained from chart ([facility] documents) and family present at bedside. Three days prior, pt started developing low grade fevers (Tm 100.5) and developed right -sided abdominal discomfort. Today he developed respiratory distress and productive cough w/ some purulent drainage around his trach site. Flu swab done on 18 and was negative; pt however, was empirically started on Tamiflu (uncertain how many days completed). CXR on  performed reveaeld diffuse lung infiltrates; pt was empirically started on Cefepime since 18 per ID recommendations at [facility]. Pt has been on a weaning protocol since 18 and his ventilatory settings have been gradually decreased with intermittent CPAP trials. His weaning has been on hold since  in the setting of his acute illness. This morning, MD at [facility] notified that pt had developed acute respiratory distress and found to have an elevated pCO2 of 64-66. At that time, pt placed back on vent support AC with good response and decreased pCO2 to 40-47 and improvement in his tachycardia. This evening, due to ongoing respiratory symptoms, including productive cough, respiratory distress and diaphoresis, patient and family request transfer to hospital.     While in the ED, VS : T: 99.5 (rectally), HR: 116 --> 170s, BP: 133/93, RR : 16, SpO2 of 98% on AC/CMV mode. Pt recieved adenosine 12mg IV x 1 which confirmed pt to be in SVT. Recieved empirice vancomycin 1gm IV x 1 and zosyn 3.375gm IV x 1. Pt recieved 2L NS bolus. Labs on admission remarkable for CBC of 19.87 (neutrophil predominance of 86.5%), Hgb of 8.8 (baseline ~7-8), Hct of 29.5, Na of 132, bicarb of 36, serum lactate of 1.5. VB.27/92/52/35 O2 79.1. 47M with h/o HTN, PFO,sarcoidosis c/b aspergilosis (s/p Voriconazole course in 2017), asthma/COPD, s/p CVA w/ L hemiplegia ((2017), pAfib (not on A/C 2/2 recurrent hemoptysis), s/p emergency L upper lobectomy for hemoptysis requiring IR emoblization of L bronchial artery (2017) complicated by pneumothorax requiring chest tube, s/p tracheostomy (17) for acute hypoxic respiratory failure and PEG placement (18), broncho-pulmonary fistula, thyroid arterial pesuodaneyrusm and recent C.diff colitis (2018 s/p course of vanco/flagyl via PEG)  presenting from acute nursing facility for acute on chronic respiratory failure, tachycardia and fevers x 1 day. Of note, collateral history obtained from chart ([facility] documents) and family present at bedside. Three days prior, pt started developing low grade fevers (Tm 100.5) and developed right -sided abdominal discomfort. Today he developed respiratory distress and productive cough w/ some purulent drainage around his trach site. Flu swab done on 18 and was negative; pt however, was empirically started on Tamiflu (uncertain how many days completed). CXR on  performed reveaeld diffuse lung infiltrates; pt was empirically started on Cefepime since 18 per ID recommendations at [facility]. Pt has been on a weaning protocol since 18 and his ventilatory settings have been gradually decreased with intermittent CPAP trials. His weaning has been on hold since  in the setting of his acute illness. This morning, MD at [facility] notified that pt had developed acute respiratory distress and found to have an elevated pCO2 of 64-66. At that time, pt placed back on vent support AC with good response and decreased pCO2 to 40-47 and improvement in his tachycardia. This evening, due to ongoing respiratory symptoms, including productive cough, respiratory distress and diaphoresis, patient and family request transfer to hospital.     While in the ED, VS : T: 99.5 (rectally), HR: 116 --> 170s, BP: 133/93, RR : 16, SpO2 of 98% on AC/CMV mode. Pt recieved adenosine 12mg IV x 1 which confirmed pt to be in SVT. Recieved empirice vancomycin 1gm IV x 1 and zosyn 3.375gm IV x 1. Pt recieved 2L NS bolus. Labs on admission remarkable for CBC of 19.87 (neutrophil predominance of 86.5%), Hgb of 8.8 (baseline ~7-8), Hct of 29.5, Na of 132, bicarb of 36, serum lactate of 1.5. VB.27/92/52/35 O2 79.1.

## 2018-02-09 NOTE — ED PROVIDER NOTE - PHYSICAL EXAMINATION
Meliza Ordonez, DO:   Gen: tachypneic, able to speak in full but shortened sentences, diaphoretic  Head: NCAT  HEENT: PERRL, MMM, normal conjunctiva, anicteric, neck supple  Lung: coarse breath sounds b/l, no adventitious sounds  CV: tachycardic with regular rhythm, no murmurs, rubs or gallops  Abd: soft, NTND, no rebound or guarding, no CVAT  MSK: No edema, no visible deformities  Neuro: Moving all extremities equally.   Skin: Warm and dry, no evidence of rash

## 2018-02-09 NOTE — H&P ADULT - ASSESSMENT
47M with complex PMHx significant for HTN, PFO,sarcoidosis c/b aspergillosis asthma/COPD, s/p CVA w/ L hemiplegia ((9/2017), pAfib (not on A/C 2/2 recurrent hemoptysis), s/p emergency L upper lobectomy for hemoptysis requiring IR embolization of L bronchial artery (11/2017) , s/p tracheostomy (11/24/17) and PEG placement (12/6/18) now returns from [facility] w/ acute on chronic respiratory failure, tachycardia and fevers x 1 week. Admitted with sepsis likely 2/2 HCAP given CXR findings and possible superimposed COPD exacerbation. Pt remains HD stable requiring RCU admission for aggressive respiratory care.

## 2018-02-09 NOTE — H&P ADULT - FAMILY HISTORY
Father  Still living? Unknown  Family history of essential hypertension, Age at diagnosis: Age Unknown  Family history of pancreatic cancer, Age at diagnosis: Age Unknown

## 2018-02-09 NOTE — H&P ADULT - NSHPSOCIALHISTORY_GEN_ALL_CORE
Pt is . Currently in an acute skilled nursing facility. Denies any history of cigarette smoking, no EtOH or illicit drug use.

## 2018-02-09 NOTE — PROVIDER CONTACT NOTE (OTHER) - BACKGROUND
Patient admitted for sepsis in ED, seen in SVT adenosine x 2 given, patient out of SVT currently tachycardic

## 2018-02-09 NOTE — H&P ADULT - NSHPREVIEWOFSYSTEMS_GEN_ALL_CORE
REVIEW OF SYSTEMS:  CONSTITUTIONAL: + weakness, + fevers , no chills  EYES/ENT: No visual changes;  No dysphagia  NECK: No pain or stiffness  RESPIRATORY: + cough, no wheezing, hemoptysis; + shortness of breath  CARDIOVASCULAR: No chest pain or palpitations; No lower extremity edema  GASTROINTESTINAL: No abdominal or epigastric pain. No nausea, vomiting, or hematemesis; No diarrhea or constipation. No melena or hematochezia.  GENITOURINARY: No dysuria, frequency or hematuria  NEUROLOGICAL: No numbness or weakness  SKIN: No itching, burning, rashes, or lesions   All other review of systems is negative unless indicated above.

## 2018-02-09 NOTE — ED ADULT NURSE REASSESSMENT NOTE - NS ED NURSE REASSESS COMMENT FT1
Report received from day shift RN Kalen. Pt hr in the low 120s now, pt is stable, in NAD. Medicated as per ordered. Will continue to monitor.

## 2018-02-09 NOTE — ED PROVIDER NOTE - MEDICAL DECISION MAKING DETAILS
Meliza Ordonez, DO: 47F in acute respiratory distress improved. Pt received 6 mg Adenosine in field & 12 mg x 2 now in sinus tach to 110s. Rectal temp 99.5 - will do infectious w/u. Suspect sinus tach 2/2 fever. Pt symptomatically improved, will treat with IVF, labs, & reassess.

## 2018-02-09 NOTE — H&P ADULT - PROBLEM SELECTOR PLAN 4
HR in 170s in ED. Appears to be SVT after adenosine pushed. Likely driven by underlying respiratory distress/fevers/ sepsis.   - treat underlying infections   - address respiratory distress  - would c/t to monitor for now   - can resume outside medications

## 2018-02-09 NOTE — H&P ADULT - NSHPLABSRESULTS_GEN_ALL_CORE
LABS              8.8    19.87 )-----------( 206      ( 09 Feb 2018 17:49 )             29.5     09 Feb 2018 17:49    132    |  88     |  15     ----------------------------<  122    Test not performed SPECIMEN GROSSLY HEMOLYZED   |  36     |  0.66     Ca    9.5        09 Feb 2018 17:49  Phos  4.4       09 Feb 2018 17:49  Mg     1.9       09 Feb 2018 17:49    TPro  8.5    /  Alb  3.1    /  TBili  0.3    /  DBili  x      /  AST  66     /  ALT  30     /  AlkPhos  104    09 Feb 2018 17:49    PT/INR - ( 09 Feb 2018 17:49 )   PT: 14.8 SEC;   INR: 1.28     PTT - ( 09 Feb 2018 17:49 )  PTT:30.7 SEC    RADIOLOGY:   All imaging reviewed by me: [x]     CXR: LABS              8.8    19.87 )-----------( 206      ( 09 Feb 2018 17:49 )             29.5     09 Feb 2018 17:49    132    |  88     |  15     ----------------------------<  122    Test not performed SPECIMEN GROSSLY HEMOLYZED   |  36     |  0.66     Ca    9.5        09 Feb 2018 17:49  Phos  4.4       09 Feb 2018 17:49  Mg     1.9       09 Feb 2018 17:49    TPro  8.5    /  Alb  3.1    /  TBili  0.3    /  DBili  x      /  AST  66     /  ALT  30     /  AlkPhos  104    09 Feb 2018 17:49    PT/INR - ( 09 Feb 2018 17:49 )   PT: 14.8 SEC;   INR: 1.28     PTT - ( 09 Feb 2018 17:49 )  PTT:30.7 SEC    RADIOLOGY:   All imaging reviewed by me: [x]     CXR (2/9/18): chronic emphysematous lungs with large bulla formation, suspect possible right middle lobe infiltrate     EKG: sinus tachycardia , nl intervals, no ischemic ST/T wave abnormalities

## 2018-02-09 NOTE — H&P ADULT - PROBLEM SELECTOR PLAN 3
Acute hypercapneic respriatory distress possibly 2/2 COPD exacerbation incited by current infection.   - c/w AC/CMV vent mode for now  - andrew ATC   - will start pt of solucortef 40 IV q12h   - c/w antibiotic coverage  - frequent suctioning and chest PT

## 2018-02-09 NOTE — H&P ADULT - ATTENDING COMMENTS
Patient with hx as above including recent long complicated hospital course for hemoptysis requiring DAVID lobectomy s/p trach sent in from the NH for fever, cough and increased secretions. As per the wife there has been a flu outbreak at the NH and patient was empirically started on tamiflu, RVP there was negative. Over the past few days he has had worsening fevers with increasing tracheal secretions and cough, he was sent to the ED today as a result. He was also give solumedrol at the NH. On exam, he is AAO x3, relatively comfortable on the vent with RR 18-20, O2 sat > 92%, SBP 150s, HR . He has coarse rhonchi b/l, appears hypovolemic as well.     Labs reviewed and as above  CXR showing chronic changes along with a new RML infiltrate Patient with hx as above including recent long complicated hospital course for hemoptysis requiring DAVID lobectomy s/p trach sent in from the NH for fever, cough and increased secretions. As per the wife there has been a flu outbreak at the NH and patient was empirically started on tamiflu, RVP there was negative. Over the past few days he has had worsening fevers with increasing tracheal secretions and cough, he was sent to the ED today as a result. He was also give solumedrol at the NH. On exam, he is AAO x3, relatively comfortable on the vent with RR 18-20, O2 sat > 92%, SBP 150s, HR . He has coarse rhonchi b/l, appears hypovolemic as well.     Labs reviewed and as above  CXR showing chronic changes along with a new RML infiltrate.  EKG showing sinus tach, no acute changes    He has received vanco/zosyn and 2L of NS in the ED. He also received 2 doses of adenosine 12 mg for presumed SVT however, it appears to be sinus tach likely in the setting of a fever and hypovolemia.     Of note, patient has been tolerating CPAP trials at the NH but does require vent support intermittently. He tried trach collar for the first time this week.    Impression  1.) Acute hypoxic and hypercapnic resp failure secondary to COPD exacerbation in the setting of PNA  2.) Sinus tachycardia secondary to above exacerbated further by hypovolemia  3.) Sarcoidosis complicated by hemoptysis s/p DAVID lobectomy and trach  4.) Hypovolemia in the setting of above    Plan  1.) Cont routine vent support, f/u ABG to adjust vent settings.  2.) CPAP vs trach collar trials in the AM tomorrow as tolerated if clinical status improves/remains stable.  3.) Cont vanco/zosyn to cover for HAP. Add azithromycin for atypical coverage.  4.) Send sputum cultures, blood cultures, urine culture, RVP, urine legionella.  5.) Would start NS at 125 cc/hr for a total of 1 more liter  6.) Routine trach care, frequent suctioning.  7.) Keep NPO for now until more appropriate, use the PEG tube in the meantime to give meds. Can likely resume PO intake tomorrow if clinical status improves/stabilizes.  8.) Nebs, steroids.  9.) Rest of the care as per the resident note above.  10.) No indication for MICU level care currently. Admit to the RCU for trach/vent management.   11.) Full code.    40 minutes of critical care time exclusive of all procedures.

## 2018-02-09 NOTE — ED ADULT NURSE NOTE - OBJECTIVE STATEMENT
Pt received a&ox3, brought in by ems from nursing home for tachycardia as notification, pt tachy to 170 upon arrival medicated twice with adenosine by MD, pt tachy down to 117 in room at present time, pt with trach, switched to vent by respiratory, pt afebrile rectally, 20 gauge IV placed in right forearm, labs drawn and sent, pt placed on monitor, will continue to monitor. Pt received a&ox3, brought in by ems from nursing home for tachycardia as notification, pt tachy to 170 upon arrival medicated twice with adenosine by MD, pt tachy down to 117 in room at present time, pt with trach, switched to vent by respiratory, pt afebrile rectally, endorses cough x 3 days with fevers at nursing home, denies urinary symptoms, stage 2 to sacrum, 20 gauge IV placed in right forearm, labs drawn and sent, pt placed on monitor, will continue to monitor.

## 2018-02-09 NOTE — H&P ADULT - PROBLEM SELECTOR PLAN 8
Psych following at [facility], believe 2/2 adjustment disorder w/ anxiety symptoms   - continue with Ativan 1mg via PEG TID PRN (dosing regimen @ [facility])

## 2018-02-10 DIAGNOSIS — R78.81 BACTEREMIA: ICD-10-CM

## 2018-02-10 DIAGNOSIS — Z29.9 ENCOUNTER FOR PROPHYLACTIC MEASURES, UNSPECIFIED: ICD-10-CM

## 2018-02-10 DIAGNOSIS — Z86.19 PERSONAL HISTORY OF OTHER INFECTIOUS AND PARASITIC DISEASES: ICD-10-CM

## 2018-02-10 DIAGNOSIS — A41.81 SEPSIS DUE TO ENTEROCOCCUS: ICD-10-CM

## 2018-02-10 DIAGNOSIS — R50.9 FEVER, UNSPECIFIED: ICD-10-CM

## 2018-02-10 LAB
ALBUMIN SERPL ELPH-MCNC: 2.9 G/DL — LOW (ref 3.3–5)
ALP SERPL-CCNC: 97 U/L — SIGNIFICANT CHANGE UP (ref 40–120)
ALT FLD-CCNC: 21 U/L — SIGNIFICANT CHANGE UP (ref 4–41)
AST SERPL-CCNC: 14 U/L — SIGNIFICANT CHANGE UP (ref 4–40)
BASOPHILS # BLD AUTO: 0.01 K/UL — SIGNIFICANT CHANGE UP (ref 0–0.2)
BASOPHILS NFR BLD AUTO: 0.1 % — SIGNIFICANT CHANGE UP (ref 0–2)
BILIRUB SERPL-MCNC: 0.3 MG/DL — SIGNIFICANT CHANGE UP (ref 0.2–1.2)
BUN SERPL-MCNC: 17 MG/DL — SIGNIFICANT CHANGE UP (ref 7–23)
CALCIUM SERPL-MCNC: 10.3 MG/DL — SIGNIFICANT CHANGE UP (ref 8.4–10.5)
CHLORIDE SERPL-SCNC: 92 MMOL/L — LOW (ref 98–107)
CO2 SERPL-SCNC: 32 MMOL/L — HIGH (ref 22–31)
CREAT SERPL-MCNC: 0.75 MG/DL — SIGNIFICANT CHANGE UP (ref 0.5–1.3)
EOSINOPHIL # BLD AUTO: 0 K/UL — SIGNIFICANT CHANGE UP (ref 0–0.5)
EOSINOPHIL NFR BLD AUTO: 0 % — SIGNIFICANT CHANGE UP (ref 0–6)
GLUCOSE SERPL-MCNC: 103 MG/DL — HIGH (ref 70–99)
GRAM STN SPT: SIGNIFICANT CHANGE UP
HCT VFR BLD CALC: 23.7 % — LOW (ref 39–50)
HGB BLD-MCNC: 7.3 G/DL — LOW (ref 13–17)
IMM GRANULOCYTES # BLD AUTO: 0.05 # — SIGNIFICANT CHANGE UP
IMM GRANULOCYTES NFR BLD AUTO: 0.4 % — SIGNIFICANT CHANGE UP (ref 0–1.5)
LYMPHOCYTES # BLD AUTO: 0.75 K/UL — LOW (ref 1–3.3)
LYMPHOCYTES # BLD AUTO: 6.1 % — LOW (ref 13–44)
MAGNESIUM SERPL-MCNC: 1.6 MG/DL — SIGNIFICANT CHANGE UP (ref 1.6–2.6)
MCHC RBC-ENTMCNC: 28.2 PG — SIGNIFICANT CHANGE UP (ref 27–34)
MCHC RBC-ENTMCNC: 30.8 % — LOW (ref 32–36)
MCV RBC AUTO: 91.5 FL — SIGNIFICANT CHANGE UP (ref 80–100)
METHOD TYPE: SIGNIFICANT CHANGE UP
MONOCYTES # BLD AUTO: 0.61 K/UL — SIGNIFICANT CHANGE UP (ref 0–0.9)
MONOCYTES NFR BLD AUTO: 5 % — SIGNIFICANT CHANGE UP (ref 2–14)
NEUTROPHILS # BLD AUTO: 10.79 K/UL — HIGH (ref 1.8–7.4)
NEUTROPHILS NFR BLD AUTO: 88.4 % — HIGH (ref 43–77)
NRBC # FLD: 0 — SIGNIFICANT CHANGE UP
ORGANISM # SPEC MICROSCOPIC CNT: SIGNIFICANT CHANGE UP
ORGANISM # SPEC MICROSCOPIC CNT: SIGNIFICANT CHANGE UP
PHOSPHATE SERPL-MCNC: 3.1 MG/DL — SIGNIFICANT CHANGE UP (ref 2.5–4.5)
PLATELET # BLD AUTO: 157 K/UL — SIGNIFICANT CHANGE UP (ref 150–400)
PMV BLD: 14 FL — HIGH (ref 7–13)
POTASSIUM SERPL-MCNC: 5.2 MMOL/L — SIGNIFICANT CHANGE UP (ref 3.5–5.3)
POTASSIUM SERPL-SCNC: 5.2 MMOL/L — SIGNIFICANT CHANGE UP (ref 3.5–5.3)
PROT SERPL-MCNC: 7.2 G/DL — SIGNIFICANT CHANGE UP (ref 6–8.3)
RBC # BLD: 2.59 M/UL — LOW (ref 4.2–5.8)
RBC # FLD: 13.2 % — SIGNIFICANT CHANGE UP (ref 10.3–14.5)
SODIUM SERPL-SCNC: 134 MMOL/L — LOW (ref 135–145)
SPECIMEN SOURCE: SIGNIFICANT CHANGE UP
WBC # BLD: 12.21 K/UL — HIGH (ref 3.8–10.5)
WBC # FLD AUTO: 12.21 K/UL — HIGH (ref 3.8–10.5)

## 2018-02-10 PROCEDURE — 99222 1ST HOSP IP/OBS MODERATE 55: CPT | Mod: GC

## 2018-02-10 PROCEDURE — 99233 SBSQ HOSP IP/OBS HIGH 50: CPT

## 2018-02-10 RX ORDER — BUDESONIDE AND FORMOTEROL FUMARATE DIHYDRATE 160; 4.5 UG/1; UG/1
2 AEROSOL RESPIRATORY (INHALATION)
Qty: 0 | Refills: 0 | Status: DISCONTINUED | OUTPATIENT
Start: 2018-02-10 | End: 2018-02-27

## 2018-02-10 RX ORDER — DAPTOMYCIN 500 MG/10ML
INJECTION, POWDER, LYOPHILIZED, FOR SOLUTION INTRAVENOUS
Qty: 0 | Refills: 0 | Status: DISCONTINUED | OUTPATIENT
Start: 2018-02-10 | End: 2018-02-15

## 2018-02-10 RX ORDER — DAPTOMYCIN 500 MG/10ML
640 INJECTION, POWDER, LYOPHILIZED, FOR SOLUTION INTRAVENOUS ONCE
Qty: 0 | Refills: 0 | Status: COMPLETED | OUTPATIENT
Start: 2018-02-10 | End: 2018-02-10

## 2018-02-10 RX ORDER — ALBUTEROL 90 UG/1
2 AEROSOL, METERED ORAL EVERY 6 HOURS
Qty: 0 | Refills: 0 | Status: DISCONTINUED | OUTPATIENT
Start: 2018-02-10 | End: 2018-02-27

## 2018-02-10 RX ORDER — CHLORHEXIDINE GLUCONATE 213 G/1000ML
1 SOLUTION TOPICAL DAILY
Qty: 0 | Refills: 0 | Status: DISCONTINUED | OUTPATIENT
Start: 2018-02-10 | End: 2018-02-27

## 2018-02-10 RX ORDER — VANCOMYCIN HCL 1 G
125 VIAL (EA) INTRAVENOUS
Qty: 0 | Refills: 0 | Status: DISCONTINUED | OUTPATIENT
Start: 2018-02-10 | End: 2018-02-27

## 2018-02-10 RX ORDER — ACETAMINOPHEN 500 MG
650 TABLET ORAL EVERY 6 HOURS
Qty: 0 | Refills: 0 | Status: DISCONTINUED | OUTPATIENT
Start: 2018-02-10 | End: 2018-02-27

## 2018-02-10 RX ORDER — IPRATROPIUM BROMIDE 0.2 MG/ML
2 SOLUTION, NON-ORAL INHALATION EVERY 6 HOURS
Qty: 0 | Refills: 0 | Status: DISCONTINUED | OUTPATIENT
Start: 2018-02-10 | End: 2018-02-25

## 2018-02-10 RX ORDER — DAPTOMYCIN 500 MG/10ML
640 INJECTION, POWDER, LYOPHILIZED, FOR SOLUTION INTRAVENOUS EVERY 24 HOURS
Qty: 0 | Refills: 0 | Status: DISCONTINUED | OUTPATIENT
Start: 2018-02-11 | End: 2018-02-15

## 2018-02-10 RX ADMIN — Medication 1 MILLIGRAM(S): at 12:09

## 2018-02-10 RX ADMIN — Medication 2 PUFF(S): at 10:55

## 2018-02-10 RX ADMIN — Medication 1 MILLIGRAM(S): at 21:56

## 2018-02-10 RX ADMIN — Medication 40 MILLIGRAM(S): at 05:35

## 2018-02-10 RX ADMIN — PIPERACILLIN AND TAZOBACTAM 25 GRAM(S): 4; .5 INJECTION, POWDER, LYOPHILIZED, FOR SOLUTION INTRAVENOUS at 17:20

## 2018-02-10 RX ADMIN — Medication 1 TABLET(S): at 21:57

## 2018-02-10 RX ADMIN — Medication 125 MILLIGRAM(S): at 17:20

## 2018-02-10 RX ADMIN — Medication 250 MILLIGRAM(S): at 05:35

## 2018-02-10 RX ADMIN — OXYCODONE HYDROCHLORIDE 10 MILLIGRAM(S): 5 TABLET ORAL at 08:31

## 2018-02-10 RX ADMIN — Medication 1 TABLET(S): at 08:31

## 2018-02-10 RX ADMIN — PANTOPRAZOLE SODIUM 40 MILLIGRAM(S): 20 TABLET, DELAYED RELEASE ORAL at 12:08

## 2018-02-10 RX ADMIN — ENOXAPARIN SODIUM 40 MILLIGRAM(S): 100 INJECTION SUBCUTANEOUS at 12:08

## 2018-02-10 RX ADMIN — Medication 40 MILLIGRAM(S): at 17:20

## 2018-02-10 RX ADMIN — Medication 650 MILLIGRAM(S): at 01:41

## 2018-02-10 RX ADMIN — PIPERACILLIN AND TAZOBACTAM 25 GRAM(S): 4; .5 INJECTION, POWDER, LYOPHILIZED, FOR SOLUTION INTRAVENOUS at 10:36

## 2018-02-10 RX ADMIN — OXYCODONE HYDROCHLORIDE 10 MILLIGRAM(S): 5 TABLET ORAL at 09:15

## 2018-02-10 RX ADMIN — Medication 2 PUFF(S): at 16:31

## 2018-02-10 RX ADMIN — Medication 650 MILLIGRAM(S): at 17:43

## 2018-02-10 RX ADMIN — PIPERACILLIN AND TAZOBACTAM 25 GRAM(S): 4; .5 INJECTION, POWDER, LYOPHILIZED, FOR SOLUTION INTRAVENOUS at 01:14

## 2018-02-10 RX ADMIN — Medication 2 PUFF(S): at 03:29

## 2018-02-10 RX ADMIN — Medication 1 TABLET(S): at 17:20

## 2018-02-10 RX ADMIN — Medication 1 TABLET(S): at 12:08

## 2018-02-10 RX ADMIN — CHLORHEXIDINE GLUCONATE 1 APPLICATION(S): 213 SOLUTION TOPICAL at 12:09

## 2018-02-10 RX ADMIN — Medication 2 PUFF(S): at 21:11

## 2018-02-10 RX ADMIN — DAPTOMYCIN 125.6 MILLIGRAM(S): 500 INJECTION, POWDER, LYOPHILIZED, FOR SOLUTION INTRAVENOUS at 15:56

## 2018-02-10 RX ADMIN — OXYCODONE HYDROCHLORIDE 10 MILLIGRAM(S): 5 TABLET ORAL at 00:35

## 2018-02-10 NOTE — CONSULT NOTE ADULT - SUBJECTIVE AND OBJECTIVE BOX
Infectious Diseases Consult note  Patient is a 47y old  Male who presents with a chief complaint of acute on chronic respiratory distress (2018 19:01)    OSCAR CHACKON-[id number]      HPI:  47M with h/o HTN, PFO,sarcoidosis c/b aspergilosis (s/p Voriconazole course in 2017), asthma/COPD, s/p CVA w/ L hemiplegia ((2017), pAfib (not on A/C 2/2 recurrent hemoptysis), s/p emergency L upper lobectomy for hemoptysis requiring IR emoblization of L bronchial artery (2017) complicated by pneumothorax requiring chest tube, s/p tracheostomy (17) for acute hypoxic respiratory failure and PEG placement (18), broncho-pulmonary fistula, thyroid arterial pesuodaneyrusm and recent C.diff colitis (2018 s/p course of vanco/flagyl via PEG)  presenting from acute nursing facility for acute on chronic respiratory failure, tachycardia and fevers x 1 day. Of note, collateral history obtained from chart ([facility] documents) and family present at bedside. Three days prior, pt started developing low grade fevers (Tm 100.5) and developed right -sided abdominal discomfort. Today he developed respiratory distress and productive cough w/ some purulent drainage around his trach site. Flu swab done on 18 and was negative; pt however, was empirically started on Tamiflu (uncertain how many days completed). CXR on  performed reveaeld diffuse lung infiltrates; pt was empirically started on Cefepime since 18 per ID recommendations at [facility]. Pt has been on a weaning protocol since 18 and his ventilatory settings have been gradually decreased with intermittent CPAP trials. His weaning has been on hold since  in the setting of his acute illness. This morning, MD at [facility] notified that pt had developed acute respiratory distress and found to have an elevated pCO2 of 64-66. At that time, pt placed back on vent support AC with good response and decreased pCO2 to 40-47 and improvement in his tachycardia. This evening, due to ongoing respiratory symptoms, including productive cough, respiratory distress and diaphoresis, patient and family request transfer to hospital.     While in the ED, VS : T: 99.5 (rectally), HR: 116 --> 170s, BP: 133/93, RR : 16, SpO2 of 98% on AC/CMV mode. Pt recieved adenosine 12mg IV x 1 which confirmed pt to be in SVT. Recieved empirice vancomycin 1gm IV x 1 and zosyn 3.375gm IV x 1. Pt recieved 2L NS bolus. Labs on admission remarkable for CBC of 19.87 (neutrophil predominance of 86.5%), Hgb of 8.8 (baseline ~7-8), Hct of 29.5, Na of 132, bicarb of 36, serum lactate of 1.5. VB.27/92/52/35 O2 79.1. (2018 19:01)    Tmax=    WBC=     ESR=   CRP=    Patient has been afebrile with no leukocytosis, no hypotension and no tachycardia. Patient's UA has >50 cells, large leukocyte esterase and negative nitrate. RVP is negative. Urine legionella is negative. Normal Lactate. Normal Liver function tests Bili/ALKP/ AST/ALT. Normal renal clearance. CXR shows clear lungs. Blood cultures show no growth to date. Urine cultures show now growth to date. Sputum cultures showed no growth to date. Wounds cultures showed no growth to date. Patient was started on             No sick contacts. No recent travel. No recent antibiotics.       REVIEW OF SYSTEMS  All as below unless noted otherwise    General:  Denies fever and chills. 	  Ophthalmologic: Denies any visual complaints. 	  ENMT: No throat pain.  Respiratory: No cough, sputum. No shortness of breath.   Cardiovascular: No chest pain.   Gastrointestinal: No nausea or vomiting. No abdominal pain or diarrhea.   Genitourinary: No burning urine, no frequency. No flank pain  Musculoskeletal: No joint swelling or pain.   Neurological: No confusion. 	  Skin: No rash    PAST MEDICAL & SURGICAL HISTORY:  History of pneumothorax: History of 3 prior pneumonthoracies.  COPD (chronic obstructive pulmonary disease)  Asthma  Hypertension  Sarcoidosis  History of thoracotomy: 17 Left thoracotomy, Left upper lobectomy   17 Reop left thoracotomy, evacuation of left hemothorax    FAMILY HISTORY:  Family history of pancreatic cancer (Father)  Family history of essential hypertension (Father)    SOCIAL HISTORY:  non smoker      ANTIMICROBIALS:    azithromycin  IVPB    azithromycin  IVPB 500 every 24 hours  piperacillin/tazobactam IVPB. 3.375 every 8 hours  vancomycin  IVPB 1000 every 12 hours    OTHER MEDS:    acetaminophen    Suspension 650 milliGRAM(s) Oral every 6 hours PRN  ALBUTerol    90 MICROgram(s) HFA Inhaler 2 Puff(s) Inhalation every 6 hours  buDESOnide 160 MICROgram(s)/formoterol 4.5 MICROgram(s) Inhaler 2 Puff(s) Inhalation two times a day  calamine Lotion 1 Application(s) Topical every 4 hours PRN  chlorhexidine 4% Liquid 1 Application(s) Topical daily  docusate sodium Liquid 10 milliGRAM(s) Oral daily  enoxaparin Injectable 40 milliGRAM(s) SubCutaneous daily  fentaNYL   Patch  12 MICROgram(s)/Hr 1 Patch Transdermal every 72 hours  folic acid 1 milliGRAM(s) Oral daily  ipratropium 17 MICROgram(s) HFA Inhaler 2 Puff(s) Inhalation every 6 hours  lactobacillus acidophilus 1 Tablet(s) Oral four times a day with meals  LORazepam     Tablet 1 milliGRAM(s) Oral three times a day PRN  methylPREDNISolone sodium succinate Injectable 40 milliGRAM(s) IV Push two times a day  oxyCODONE    IR 10 milliGRAM(s) Oral every 8 hours PRN  pantoprazole  Injectable 40 milliGRAM(s) IV Push daily  senna Syrup 10 milliLiter(s) Oral at bedtime    Allergies  No Known Allergies        Vital Signs Last 24 Hrs  T(C): 36.9 (10 Feb 2018 10:38), Max: 38.1 (10 Feb 2018 01:19)  T(F): 98.4 (10 Feb 2018 10:38), Max: 100.5 (10 Feb 2018 01:19)  HR: 107 (10 Feb 2018 10:58) (106 - 156)  BP: 123/91 (10 Feb 2018 10:38) (119/79 - 160/103)  RR: 18 (10 Feb 2018 10:38) (16 - 27)  SpO2: 100% (10 Feb 2018 10:58) (98% - 100%)  Daily Height in cm: 180.34 (2018 22:33)            PHYSICAL EXAM:  patient in no acute respiratory distress.  Constitutional: Comfortable. Awake and alert  Eyes: PERRL EOMI. No discharge.  ENMT: No sinus tenderness.  No pharyngeal erythema.   Neck: Supple. No thyromegaly   Respiratory: Good air entry bilaterally, no wheezes, rhonchi, or crackles  Cardiovascular:S1 S2 wnl, No murmurs  Gastrointestinal: Soft, no tenderness , bowel sounds present,   Genitourinary: No suprapubic tenderness. no CVA tenderness   Musculoskeletal: No joint swelling. No edema.  Vascular: peripheral pulses felt  Neurological: No grossly focal deficits  Skin: No rash   Lymph Nodes: No palpable Lymphadenopathy   Psychiatric: Affect normal  Lines:                           7.3    12.21 )-----------( 157      ( 10 Feb 2018 06:05 )             23.7     WBC Count: 12.21 (02-10 @ 06:05)  WBC Count: 19.87 ( @ 17:49)    02-10    134<L>  |  92<L>  |  17  ----------------------------<  103<H>  5.2   |  32<H>  |  0.75    Ca    10.3      10 Feb 2018 06:05  Phos  3.1     02-10  Mg     1.6     02-10    TPro  7.2  /  Alb  2.9<L>  /  TBili  0.3  /  DBili  x   /  AST  14  /  ALT  21  /  AlkPhos  97  02-10    PT/INR - ( 2018 17:49 )   PT: 14.8 SEC;   INR: 1.28          PTT - ( 2018 17:49 )  PTT:30.7 SEC    CARDIAC MARKERS ( 2018 17:49 )  x     / < 0.06 ng/mL / 120 u/L / 1.80 ng/mL / x          RVP:   @ 18:12  229E Coronavirus: --           Adenovirus: NOT DETECTED  Bordetella Pertussis NOT DETECTED  Chlamydia Pneumoniae NOT DETECTED  Entero/Rhinovirus NOT DETECTED  HKU1 Coronavirus --           hMPV NOT DETECTED  Influenza A NOT DETECTED (any subtype)  Influenza AH1 NOT DETECTED  Influenza AH1 2009 NOT DETECTED  Influenza AH3 NOT DETECTED  Influenza B NOT DETECTED  Mycoplasma pneumoniae NOT DETECTED This nucleic acid amplification assay was performed using  the [employer]. The following pathogens are tested  for: Adenovirus, Coronavirus 229E, Coronavirus HKU1,  Coronavirus NL63, Coronavirus OC43, Human Metapneumovirus  (HMPV), Rhinovirus/Enterovirus, Influenza A H1, Influenza A  H1 2009 (Pandemic H1 2009), Influenza A H3, Influenza A (Flu  A) subtype not identified, Influenza B, Parainfluenza Virus  (types 1, 2, 3, 4), Respiratory Syncytial Virus (RSV),  Bordetella pertussis, Chlamydophila pneumoniae, and  Mycoplasma pneumoniae. A negative FilmArray result does not  always exclude the possibility of viral or bacterial  infection. Laboratory results should always be interpreted  in the context of clinical findings.  NL63 Coronavirus --           OC43 Coronavirus --           Parainfluenza 1 NOT DETECTED  Parainfluenza 2 NOT DETECTED  Parainfluenza 3 NOT DETECTED  Parainfluenza 4 NOT DETECTED  Resp Syncytial Virus NOT DETECTED    Urinalysis Basic - ( 2018 18:47 )    Color: YELLOW / Appearance: HAZY / S.026 / pH: 5.5  Gluc: NEGATIVE / Ketone: NEGATIVE  / Bili: NEGATIVE / Urobili: NORMAL mg/dL   Blood: LARGE / Protein: 100 mg/dL / Nitrite: NEGATIVE   Leuk Esterase: NEGATIVE / RBC: >50 / WBC 2-5   Sq Epi: OCC / Non Sq Epi: x / Bacteria: x          MICROBIOLOGY:      Culture - Blood (18 @ 19:02)    Culture - Blood:   ***Blood Panel PCR results on this specimen are available  approximately 3 hours after the Gram stain result***  Gram stain, PCR, and/or culture results may not always  correspond due to difference in methodologies  ------------------------------------------------------------  This PCR assay was performed using [employer].  The  following targets are tested for:  Enterococcus, vancomycin  resistant enterococci, Listeria monocytogenes,  coagulase  negative staphylococci, S. aureus, methicillin resistant S.  aureus, Streptococcus agalactiae (Group B), S. pneumoniae,  S. pyogenes (Group A), Acinetobacter baumannii, Enterobacter  cloacae, E. coli, Klebsiella oxytoca, K. pneumoniae, Proteus  sp., Serratia marcescens, Haemophilus influenzae, Neisseria  meningitidis, Pseudomonas aeruginosa, Candida albicans, C.  glabrata, C. krusei, C. parapsilosis, C. tropicalis and the  KPC resistance gene.    -  Vancomycin resistant Enterococcus sp.: + DETECT FELIX    Specimen Source: BLOOD    Organism: BLOOD CULTURE PCR    Gram Stain Blood:   ***** CRITICAL RESULT *****  PERSON CALLED / READ-BACK: NERISSA BORREGO/CHARLIE/Y  DATE / TIME CALLED: 02/10/18 1108  CALLED BY: CARLYLE REEDER  GPCPR^Gram Pos Cocci in Pairs  AFTER: 15 HOURS INCUBATION  BOTTLE: AEROBIC BOTTLE    Organism Identification: BLOOD CULTURE PCR    Method Type: PCR      Culture - Respiratory with Gram Stain (collected 10 Feb 2018 09:10)  Source: SPUTUM        RADIOLOGY:    < from: Xray Chest 1 View AP/PA (18 @ 18:11) >  Status post tracheostomy tube placement.    Cardiomediastinal silhouette within normal limits.   Diffuse bilateral emphysematous changes with large bulla formations,   grossly unchanged secondary to sarcoidosis. Left upper lobectomy has been   performed.  Small right pleural effusion.  Mild degenerative changes of spine.     IMPRESSION:   Diffuse bilateral emphysematous changes with large bulla formations,   grossly unchanged. Infectious Diseases Consult note  Patient is a 47y old  Male who presents with a chief complaint of acute on chronic respiratory distress (2018 19:01)    OSCAR CHACKON-[id number]      HPI:  47M with h/o HTN, PFO,sarcoidosis c/b aspergilosis (s/p Voriconazole course in 2017), asthma/COPD, s/p CVA w/ L hemiplegia ((2017), pAfib (not on A/C 2/2 recurrent hemoptysis), s/p emergency L upper lobectomy for hemoptysis requiring IR emoblization of L bronchial artery (2017) complicated by pneumothorax requiring chest tube, s/p tracheostomy (17) for acute hypoxic respiratory failure and PEG placement (18), broncho-pulmonary fistula, thyroid arterial pesuodaneyrusm and recent C.diff colitis (2018 s/p course of vanco/flagyl via PEG)  presenting from acute nursing facility for acute on chronic respiratory failure, tachycardia and fevers x 1 day. Of note, collateral history obtained from chart ([facility] documents) and family present at bedside. Three days prior, pt started developing low grade fevers (Tm 100.5) and developed right -sided abdominal discomfort. Today he developed respiratory distress and productive cough w/ some purulent drainage around his trach site. Flu swab done on 18 and was negative; pt however, was empirically started on Tamiflu (uncertain how many days completed). CXR on  performed reveaeld diffuse lung infiltrates; pt was empirically started on Cefepime since 18 per ID recommendations at [facility]. Pt has been on a weaning protocol since 18 and his ventilatory settings have been gradually decreased with intermittent CPAP trials. His weaning has been on hold since  in the setting of his acute illness. This morning, MD at [facility] notified that pt had developed acute respiratory distress and found to have an elevated pCO2 of 64-66. At that time, pt placed back on vent support AC with good response and decreased pCO2 to 40-47 and improvement in his tachycardia. This evening, due to ongoing respiratory symptoms, including productive cough, respiratory distress and diaphoresis, patient and family request transfer to hospital.     While in the ED, VS : T: 99.5 (rectally), HR: 116 --> 170s, BP: 133/93, RR : 16, SpO2 of 98% on AC/CMV mode. Pt recieved adenosine 12mg IV x 1 which confirmed pt to be in SVT. Recieved empirice vancomycin 1gm IV x 1 and zosyn 3.375gm IV x 1. Pt recieved 2L NS bolus. Labs on admission remarkable for CBC of 19.87 (neutrophil predominance of 86.5%), Hgb of 8.8 (baseline ~7-8), Hct of 29.5, Na of 132, bicarb of 36, serum lactate of 1.5. VB.27/92/52/35 O2 79.1. (2018 19:01)    Tmax=    WBC=     ESR=   CRP=    Patient has been afebrile with no leukocytosis, no hypotension and no tachycardia. Patient's UA has >50 cells, large leukocyte esterase and negative nitrate. RVP is negative. Urine legionella is negative. Normal Lactate. Normal Liver function tests Bili/ALKP/ AST/ALT. Normal renal clearance. CXR shows clear lungs. Blood cultures show no growth to date. Urine cultures show now growth to date. Sputum cultures showed no growth to date. Wounds cultures showed no growth to date. Patient was started on             No sick contacts. No recent travel. No recent antibiotics.       REVIEW OF SYSTEMS  All as below unless noted otherwise    General:  Denies fever and chills. 	  Ophthalmologic: Denies any visual complaints. 	  ENMT: No throat pain.  Respiratory: No cough, sputum. No shortness of breath.   Cardiovascular: No chest pain.   Gastrointestinal: No nausea or vomiting. No abdominal pain or diarrhea.   Genitourinary: No burning urine, no frequency. No flank pain  Musculoskeletal: No joint swelling or pain.   Neurological: No confusion. 	  Skin: No rash    PAST MEDICAL & SURGICAL HISTORY:  History of pneumothorax: History of 3 prior pneumonthoracies.  COPD (chronic obstructive pulmonary disease)  Asthma  Hypertension  Sarcoidosis  History of thoracotomy: 17 Left thoracotomy, Left upper lobectomy   17 Reop left thoracotomy, evacuation of left hemothorax    FAMILY HISTORY:  Family history of pancreatic cancer (Father)  Family history of essential hypertension (Father)    SOCIAL HISTORY:  non smoker      ANTIMICROBIALS:    azithromycin  IVPB    azithromycin  IVPB 500 every 24 hours  piperacillin/tazobactam IVPB. 3.375 every 8 hours  vancomycin  IVPB 1000 every 12 hours    OTHER MEDS:    acetaminophen    Suspension 650 milliGRAM(s) Oral every 6 hours PRN  ALBUTerol    90 MICROgram(s) HFA Inhaler 2 Puff(s) Inhalation every 6 hours  buDESOnide 160 MICROgram(s)/formoterol 4.5 MICROgram(s) Inhaler 2 Puff(s) Inhalation two times a day  calamine Lotion 1 Application(s) Topical every 4 hours PRN  chlorhexidine 4% Liquid 1 Application(s) Topical daily  docusate sodium Liquid 10 milliGRAM(s) Oral daily  enoxaparin Injectable 40 milliGRAM(s) SubCutaneous daily  fentaNYL   Patch  12 MICROgram(s)/Hr 1 Patch Transdermal every 72 hours  folic acid 1 milliGRAM(s) Oral daily  ipratropium 17 MICROgram(s) HFA Inhaler 2 Puff(s) Inhalation every 6 hours  lactobacillus acidophilus 1 Tablet(s) Oral four times a day with meals  LORazepam     Tablet 1 milliGRAM(s) Oral three times a day PRN  methylPREDNISolone sodium succinate Injectable 40 milliGRAM(s) IV Push two times a day  oxyCODONE    IR 10 milliGRAM(s) Oral every 8 hours PRN  pantoprazole  Injectable 40 milliGRAM(s) IV Push daily  senna Syrup 10 milliLiter(s) Oral at bedtime    Allergies  No Known Allergies        Vital Signs Last 24 Hrs  T(C): 36.9 (10 Feb 2018 10:38), Max: 38.1 (10 Feb 2018 01:19)  T(F): 98.4 (10 Feb 2018 10:38), Max: 100.5 (10 Feb 2018 01:19)  HR: 107 (10 Feb 2018 10:58) (106 - 156)  BP: 123/91 (10 Feb 2018 10:38) (119/79 - 160/103)  RR: 18 (10 Feb 2018 10:38) (16 - 27)  SpO2: 100% (10 Feb 2018 10:58) (98% - 100%)  Daily Height in cm: 180.34 (2018 22:33)            PHYSICAL EXAM:  patient in no acute respiratory distress.  Constitutional: Comfortable. Awake and alert  Eyes: PERRL EOMI. No discharge.  ENMT: No sinus tenderness.  No pharyngeal erythema.   Neck: Supple. No thyromegaly, trach+   Respiratory: Good air entry bilaterally, occ wheezes  Cardiovascular:S1 S2 wnl  Gastrointestinal: Soft, no tenderness , bowel sounds present,   Genitourinary: No suprapubic tenderness. no CVA tenderness, [place]+   Musculoskeletal: No joint swelling. No edema.  Vascular: peripheral pulses felt  Neurological: No grossly focal deficits  Skin: No rash   Lymph Nodes: No palpable Lymphadenopathy   Psychiatric: Affect normal  Lines:                           7.3    12. )-----------( 157      ( 10 Feb 2018 06:05 )             23.7     WBC Count: 12.21 (02-10 @ 06:05)  WBC Count: 19.87 ( @ 17:49)    02-10    134<L>  |  92<L>  |  17  ----------------------------<  103<H>  5.2   |  32<H>  |  0.75    Ca    10.3      10 Feb 2018 06:05  Phos  3.1     02-10  Mg     1.6     10    TPro  7.2  /  Alb  2.9<L>  /  TBili  0.3  /  DBili  x   /  AST  14  /  ALT  21  /  AlkPhos  97  02-10    PT/INR - ( 2018 17:49 )   PT: 14.8 SEC;   INR: 1.28          PTT - ( 2018 17:49 )  PTT:30.7 SEC    CARDIAC MARKERS ( 2018 17:49 )  x     / < 0.06 ng/mL / 120 u/L / 1.80 ng/mL / x          RVP:   @ 18:12  229E Coronavirus: --           Adenovirus: NOT DETECTED  Bordetella Pertussis NOT DETECTED  Chlamydia Pneumoniae NOT DETECTED  Entero/Rhinovirus NOT DETECTED  HKU1 Coronavirus --           hMPV NOT DETECTED  Influenza A NOT DETECTED (any subtype)  Influenza AH1 NOT DETECTED  Influenza AH1 2009 NOT DETECTED  Influenza AH3 NOT DETECTED  Influenza B NOT DETECTED  Mycoplasma pneumoniae NOT DETECTED This nucleic acid amplification assay was performed using  the [employer]. The following pathogens are tested  for: Adenovirus, Coronavirus 229E, Coronavirus HKU1,  Coronavirus NL63, Coronavirus OC43, Human Metapneumovirus  (HMPV), Rhinovirus/Enterovirus, Influenza A H1, Influenza A  H1 2009 (Pandemic H1 2009), Influenza A H3, Influenza A (Flu  A) subtype not identified, Influenza B, Parainfluenza Virus  (types 1, 2, 3, 4), Respiratory Syncytial Virus (RSV),  Bordetella pertussis, Chlamydophila pneumoniae, and  Mycoplasma pneumoniae. A negative FilmArray result does not  always exclude the possibility of viral or bacterial  infection. Laboratory results should always be interpreted  in the context of clinical findings.  NL63 Coronavirus --           OC43 Coronavirus --           Parainfluenza 1 NOT DETECTED  Parainfluenza 2 NOT DETECTED  Parainfluenza 3 NOT DETECTED  Parainfluenza 4 NOT DETECTED  Resp Syncytial Virus NOT DETECTED    Urinalysis Basic - ( 2018 18:47 )    Color: YELLOW / Appearance: HAZY / S.026 / pH: 5.5  Gluc: NEGATIVE / Ketone: NEGATIVE  / Bili: NEGATIVE / Urobili: NORMAL mg/dL   Blood: LARGE / Protein: 100 mg/dL / Nitrite: NEGATIVE   Leuk Esterase: NEGATIVE / RBC: >50 / WBC 2-5   Sq Epi: OCC / Non Sq Epi: x / Bacteria: x          MICROBIOLOGY:      Culture - Blood (18 @ 19:02)    Culture - Blood:   ***Blood Panel PCR results on this specimen are available  approximately 3 hours after the Gram stain result***  Gram stain, PCR, and/or culture results may not always  correspond due to difference in methodologies  ------------------------------------------------------------  This PCR assay was performed using [employer].  The  following targets are tested for:  Enterococcus, vancomycin  resistant enterococci, Listeria monocytogenes,  coagulase  negative staphylococci, S. aureus, methicillin resistant S.  aureus, Streptococcus agalactiae (Group B), S. pneumoniae,  S. pyogenes (Group A), Acinetobacter baumannii, Enterobacter  cloacae, E. coli, Klebsiella oxytoca, K. pneumoniae, Proteus  sp., Serratia marcescens, Haemophilus influenzae, Neisseria  meningitidis, Pseudomonas aeruginosa, Candida albicans, C.  glabrata, C. krusei, C. parapsilosis, C. tropicalis and the  KPC resistance gene.    -  Vancomycin resistant Enterococcus sp.: + DETECT FELIX    Specimen Source: BLOOD    Organism: BLOOD CULTURE PCR    Gram Stain Blood:   ***** CRITICAL RESULT *****  PERSON CALLED / READ-BACK: NERISSA BORREGO/CHARLIE/ELISE  DATE / TIME CALLED: 02/10/18 1108  CALLED BY: CRALYLE REEDER  GPCPR^Gram Pos Cocci in Pairs  AFTER: 15 HOURS INCUBATION  BOTTLE: AEROBIC BOTTLE    Organism Identification: BLOOD CULTURE PCR    Method Type: PCR      Culture - Respiratory with Gram Stain (collected 10 Feb 2018 09:10)  Source: SPUTUM        RADIOLOGY:    < from: Xray Chest 1 View AP/PA (18 @ 18:11) >  Status post tracheostomy tube placement.    Cardiomediastinal silhouette within normal limits.   Diffuse bilateral emphysematous changes with large bulla formations,   grossly unchanged secondary to sarcoidosis. Left upper lobectomy has been   performed.  Small right pleural effusion.  Mild degenerative changes of spine.     IMPRESSION:   Diffuse bilateral emphysematous changes with large bulla formations,   grossly unchanged.

## 2018-02-10 NOTE — PROGRESS NOTE ADULT - SUBJECTIVE AND OBJECTIVE BOX
CHIEF COMPLAINT: Patient is a 47y old  Male who presents with a chief complaint of acute on chronic respiratory distress (2018 19:01)    Interval Events:      REVIEW OF SYSTEMS:  Constitutional:   Eyes:  ENT:  CV:  Resp:  GI:  :  MSK:  Integumentary:  Neurological:  Psychiatric:  Endocrine:  Hematologic/Lymphatic:  Allergic/Immunologic:  [ ] All other systems negative  [ ] Unable to assess ROS because ________      OBJECTIVE:  ICU Vital Signs Last 24 Hrs  T(C): 37.1 (10 Feb 2018 05:34), Max: 38.1 (10 Feb 2018 01:19)  T(F): 98.8 (10 Feb 2018 05:34), Max: 100.5 (10 Feb 2018 01:19)  HR: 107 (10 Feb 2018 07:20) (106 - 156)  BP: 130/98 (10 Feb 2018 05:34) (119/79 - 160/103)  BP(mean): --  ABP: --  ABP(mean): --  RR: 17 (10 Feb 2018 05:34) (16 - 27)  SpO2: 100% (10 Feb 2018 07:20) (98% - 100%)    Mode: AC/ CMV (Assist Control/ Continuous Mandatory Ventilation), RR (machine): 16, TV (machine): 500, FiO2: 40, PEEP: 5, ITime: 0.8, MAP: 14.7, PIP: 41      HOSPITAL MEDICATIONS:  MEDICATIONS  (STANDING):  ALBUTerol    90 MICROgram(s) HFA Inhaler 2 Puff(s) Inhalation every 6 hours  azithromycin  IVPB      azithromycin  IVPB 500 milliGRAM(s) IV Intermittent every 24 hours  buDESOnide   0.25 milliGRAM(s) Respule 0.25 milliGRAM(s) Inhalation every 12 hours  docusate sodium Liquid 10 milliGRAM(s) Oral daily  enoxaparin Injectable 40 milliGRAM(s) SubCutaneous daily  fentaNYL   Patch  12 MICROgram(s)/Hr 1 Patch Transdermal every 72 hours  folic acid 1 milliGRAM(s) Oral daily  ipratropium 17 MICROgram(s) HFA Inhaler 2 Puff(s) Inhalation every 6 hours  lactobacillus acidophilus 1 Tablet(s) Oral four times a day with meals  methylPREDNISolone sodium succinate Injectable 40 milliGRAM(s) IV Push two times a day  pantoprazole  Injectable 40 milliGRAM(s) IV Push daily  piperacillin/tazobactam IVPB. 3.375 Gram(s) IV Intermittent every 8 hours  senna Syrup 10 milliLiter(s) Oral at bedtime  sodium chloride 0.9%. 1000 milliLiter(s) (100 mL/Hr) IV Continuous <Continuous>  vancomycin  IVPB 1000 milliGRAM(s) IV Intermittent every 12 hours    MEDICATIONS  (PRN):  acetaminophen    Suspension 650 milliGRAM(s) Oral every 6 hours PRN For Temp greater than 38 C (100.4 F)  calamine Lotion 1 Application(s) Topical every 4 hours PRN Rash and/or Itching  LORazepam     Tablet 1 milliGRAM(s) Oral three times a day PRN Anxiety  oxyCODONE    IR 10 milliGRAM(s) Oral every 8 hours PRN Moderate Pain (4 - 6)      LABS:                        7.3    12.21 )-----------( 157      ( 10 Feb 2018 06:05 )             23.7     02-10    134<L>  |  92<L>  |  17  ----------------------------<  103<H>  5.2   |  32<H>  |  0.75    Ca    10.3      10 Feb 2018 06:05  Phos  3.1     02-10  Mg     1.6     02-10    TPro  7.2  /  Alb  2.9<L>  /  TBili  0.3  /  DBili  x   /  AST  14  /  ALT  21  /  AlkPhos  97  02-10    PT/INR - ( 2018 17:49 )   PT: 14.8 SEC;   INR: 1.28          PTT - ( 2018 17:49 )  PTT:30.7 SEC  Urinalysis Basic - ( 2018 18:47 )    Color: YELLOW / Appearance: HAZY / S.026 / pH: 5.5  Gluc: NEGATIVE / Ketone: NEGATIVE  / Bili: NEGATIVE / Urobili: NORMAL mg/dL   Blood: LARGE / Protein: 100 mg/dL / Nitrite: NEGATIVE   Leuk Esterase: NEGATIVE / RBC: >50 / WBC 2-5   Sq Epi: OCC / Non Sq Epi: x / Bacteria: x        Venous Blood Gas:   @ 17:49  7.27/92/52/35/79.1  VBG Lactate: 1.5      MICROBIOLOGY:     RADIOLOGY:  [ ] Reviewed and interpreted by me    PULMONARY FUNCTION TESTS:    EKG: CHIEF COMPLAINT: Patient is a 47y old  Male who presents with a chief complaint of acute on chronic respiratory distress (2018 19:01)    Interval Events: admitted yesterday      REVIEW OF SYSTEMS:  Constitutional: feels better than yesterday  CV: denies  Resp: denies  GI: denies  [x] All other systems negative  [ ] Unable to assess ROS because ________      OBJECTIVE:  ICU Vital Signs Last 24 Hrs  T(C): 37.1 (10 Feb 2018 05:34), Max: 38.1 (10 Feb 2018 01:19)  T(F): 98.8 (10 Feb 2018 05:34), Max: 100.5 (10 Feb 2018 01:19)  HR: 107 (10 Feb 2018 07:20) (106 - 156)  BP: 130/98 (10 Feb 2018 05:34) (119/79 - 160/103)  BP(mean): --  ABP: --  ABP(mean): --  RR: 17 (10 Feb 2018 05:34) (16 - 27)  SpO2: 100% (10 Feb 2018 07:20) (98% - 100%)    Mode: AC/ CMV (Assist Control/ Continuous Mandatory Ventilation), RR (machine): 16, TV (machine): 500, FiO2: 40, PEEP: 5, ITime: 0.8, MAP: 14.7, PIP: 41    HOSPITAL MEDICATIONS:  MEDICATIONS  (STANDING):  ALBUTerol    90 MICROgram(s) HFA Inhaler 2 Puff(s) Inhalation every 6 hours  azithromycin  IVPB      azithromycin  IVPB 500 milliGRAM(s) IV Intermittent every 24 hours  buDESOnide   0.25 milliGRAM(s) Respule 0.25 milliGRAM(s) Inhalation every 12 hours  docusate sodium Liquid 10 milliGRAM(s) Oral daily  enoxaparin Injectable 40 milliGRAM(s) SubCutaneous daily  fentaNYL   Patch  12 MICROgram(s)/Hr 1 Patch Transdermal every 72 hours  folic acid 1 milliGRAM(s) Oral daily  ipratropium 17 MICROgram(s) HFA Inhaler 2 Puff(s) Inhalation every 6 hours  lactobacillus acidophilus 1 Tablet(s) Oral four times a day with meals  methylPREDNISolone sodium succinate Injectable 40 milliGRAM(s) IV Push two times a day  pantoprazole  Injectable 40 milliGRAM(s) IV Push daily  piperacillin/tazobactam IVPB. 3.375 Gram(s) IV Intermittent every 8 hours  senna Syrup 10 milliLiter(s) Oral at bedtime  sodium chloride 0.9%. 1000 milliLiter(s) (100 mL/Hr) IV Continuous <Continuous>  vancomycin  IVPB 1000 milliGRAM(s) IV Intermittent every 12 hours    MEDICATIONS  (PRN):  acetaminophen    Suspension 650 milliGRAM(s) Oral every 6 hours PRN For Temp greater than 38 C (100.4 F)  calamine Lotion 1 Application(s) Topical every 4 hours PRN Rash and/or Itching  LORazepam     Tablet 1 milliGRAM(s) Oral three times a day PRN Anxiety  oxyCODONE    IR 10 milliGRAM(s) Oral every 8 hours PRN Moderate Pain (4 - 6)      LABS:                        7.3    12.21 )-----------( 157      ( 10 Feb 2018 06:05 )             23.7     02-10    134<L>  |  92<L>  |  17  ----------------------------<  103<H>  5.2   |  32<H>  |  0.75    Ca    10.3      10 Feb 2018 06:05  Phos  3.1     02-10  Mg     1.6     02-10    TPro  7.2  /  Alb  2.9<L>  /  TBili  0.3  /  DBili  x   /  AST  14  /  ALT  21  /  AlkPhos  97  02-10    PT/INR - ( 2018 17:49 )   PT: 14.8 SEC;   INR: 1.28          PTT - ( 2018 17:49 )  PTT:30.7 SEC  Urinalysis Basic - ( 2018 18:47 )    Color: YELLOW / Appearance: HAZY / S.026 / pH: 5.5  Gluc: NEGATIVE / Ketone: NEGATIVE  / Bili: NEGATIVE / Urobili: NORMAL mg/dL   Blood: LARGE / Protein: 100 mg/dL / Nitrite: NEGATIVE   Leuk Esterase: NEGATIVE / RBC: >50 / WBC 2-5   Sq Epi: OCC / Non Sq Epi: x / Bacteria: x      RADIOLOGY:  < from: Xray Chest 1 View AP/PA (18 @ 18:11) >  INTERPRETATION:    CLINICAL INDICATION: Tachycardia.    TECHNIQUE: AP view of the chest.    COMPARISON: Chest radiograph from 2017.    FINDINGS:   Status post tracheostomy tube placement.    Cardiomediastinal silhouette within normal limits.   Diffuse bilateral emphysematous changes with large bulla formations,   grossly unchanged secondary to sarcoidosis. Left upper lobectomy has been   performed.  Small right pleural effusion.  Mild degenerative changes of spine.     IMPRESSION:   Diffuse bilateral emphysematous changes with large bulla formations,   grossly unchanged.      < end of copied text >

## 2018-02-10 NOTE — PROGRESS NOTE ADULT - PROBLEM SELECTOR PLAN 2
- now improved  - tolerating vent settings  - will try weaning when feels better, possibly in am  - chest PT  - trach care daily  - suction prn

## 2018-02-10 NOTE — CONSULT NOTE ADULT - PROBLEM SELECTOR RECOMMENDATION 9
-?source - Gu vs GI  -check Ct a/p with contrst  -f/u urine cx  -daptomycin 8mg/kg iv q24  -cont zosyn 3.375 gm iv q8  -Dc IV vanco and zithromax  -check TTE  -Check CPK in AM

## 2018-02-10 NOTE — PROGRESS NOTE ADULT - PROBLEM SELECTOR PLAN 1
- continues low grade fever  - leukocytosis downtrending  - cont with broad spectrum abx (vanco, zosyn, azithro)  - RVP negative  - check legionella  - blood cultures pending  - urine culture pending  - monitor fever and WBC curve

## 2018-02-10 NOTE — PROGRESS NOTE ADULT - ATTENDING COMMENTS
I have seen and examined the patient. I agree with the above history, physical exam, and plan of care except for as detailed below.    Sarcoidosis, complicated pulmonary history s/p trach on mechanical ventilation presenting with acute on chronic respiratory failure likely secondary to PNA. Currently improving on broad spectrum abx. Leukocytosis improving.    Send cultures, continue abx, free water restriction for hyponatremia.  Continue solumedrol, inhalers for possible flare of obstructive disease, start weaning solumedrol tomorrow if respiratory status continues to improve.

## 2018-02-10 NOTE — CONSULT NOTE ADULT - ATTENDING COMMENTS
Andres Cope  Attending Physician   Division of Infectious Disease  Pager #[phone]  After 5pm/weekend or no response, call #[phone]

## 2018-02-11 LAB
BACTERIA UR CULT: SIGNIFICANT CHANGE UP
BLD GP AB SCN SERPL QL: NEGATIVE — SIGNIFICANT CHANGE UP
BUN SERPL-MCNC: 23 MG/DL — SIGNIFICANT CHANGE UP (ref 7–23)
CALCIUM SERPL-MCNC: 10.2 MG/DL — SIGNIFICANT CHANGE UP (ref 8.4–10.5)
CHLORIDE SERPL-SCNC: 96 MMOL/L — LOW (ref 98–107)
CK SERPL-CCNC: 27 U/L — LOW (ref 30–200)
CO2 SERPL-SCNC: 34 MMOL/L — HIGH (ref 22–31)
CREAT SERPL-MCNC: 0.63 MG/DL — SIGNIFICANT CHANGE UP (ref 0.5–1.3)
GLUCOSE SERPL-MCNC: 160 MG/DL — HIGH (ref 70–99)
HCT VFR BLD CALC: 23.4 % — LOW (ref 39–50)
HGB BLD-MCNC: 7.3 G/DL — LOW (ref 13–17)
L PNEUMO AG UR QL: NEGATIVE — SIGNIFICANT CHANGE UP
MCHC RBC-ENTMCNC: 28.1 PG — SIGNIFICANT CHANGE UP (ref 27–34)
MCHC RBC-ENTMCNC: 31.2 % — LOW (ref 32–36)
MCV RBC AUTO: 90 FL — SIGNIFICANT CHANGE UP (ref 80–100)
NRBC # FLD: 0 — SIGNIFICANT CHANGE UP
ORGANISM # SPEC MICROSCOPIC CNT: SIGNIFICANT CHANGE UP
PLATELET # BLD AUTO: 197 K/UL — SIGNIFICANT CHANGE UP (ref 150–400)
PMV BLD: 13.4 FL — HIGH (ref 7–13)
POTASSIUM SERPL-MCNC: 4.4 MMOL/L — SIGNIFICANT CHANGE UP (ref 3.5–5.3)
POTASSIUM SERPL-SCNC: 4.4 MMOL/L — SIGNIFICANT CHANGE UP (ref 3.5–5.3)
RBC # BLD: 2.6 M/UL — LOW (ref 4.2–5.8)
RBC # FLD: 13.6 % — SIGNIFICANT CHANGE UP (ref 10.3–14.5)
RH IG SCN BLD-IMP: POSITIVE — SIGNIFICANT CHANGE UP
SODIUM SERPL-SCNC: 139 MMOL/L — SIGNIFICANT CHANGE UP (ref 135–145)
SPECIMEN SOURCE: SIGNIFICANT CHANGE UP
WBC # BLD: 13.99 K/UL — HIGH (ref 3.8–10.5)
WBC # FLD AUTO: 13.99 K/UL — HIGH (ref 3.8–10.5)

## 2018-02-11 PROCEDURE — 99233 SBSQ HOSP IP/OBS HIGH 50: CPT

## 2018-02-11 PROCEDURE — 71045 X-RAY EXAM CHEST 1 VIEW: CPT | Mod: 26

## 2018-02-11 PROCEDURE — 99232 SBSQ HOSP IP/OBS MODERATE 35: CPT

## 2018-02-11 RX ORDER — ALBUTEROL 90 UG/1
2 AEROSOL, METERED ORAL EVERY 6 HOURS
Qty: 0 | Refills: 0 | Status: DISCONTINUED | OUTPATIENT
Start: 2018-02-11 | End: 2018-02-27

## 2018-02-11 RX ADMIN — OXYCODONE HYDROCHLORIDE 10 MILLIGRAM(S): 5 TABLET ORAL at 22:47

## 2018-02-11 RX ADMIN — Medication 2 PUFF(S): at 10:32

## 2018-02-11 RX ADMIN — Medication 40 MILLIGRAM(S): at 06:03

## 2018-02-11 RX ADMIN — Medication 650 MILLIGRAM(S): at 20:54

## 2018-02-11 RX ADMIN — Medication 125 MILLIGRAM(S): at 17:43

## 2018-02-11 RX ADMIN — ENOXAPARIN SODIUM 40 MILLIGRAM(S): 100 INJECTION SUBCUTANEOUS at 12:15

## 2018-02-11 RX ADMIN — ALBUTEROL 2 PUFF(S): 90 AEROSOL, METERED ORAL at 11:39

## 2018-02-11 RX ADMIN — PIPERACILLIN AND TAZOBACTAM 25 GRAM(S): 4; .5 INJECTION, POWDER, LYOPHILIZED, FOR SOLUTION INTRAVENOUS at 02:50

## 2018-02-11 RX ADMIN — BUDESONIDE AND FORMOTEROL FUMARATE DIHYDRATE 2 PUFF(S): 160; 4.5 AEROSOL RESPIRATORY (INHALATION) at 10:35

## 2018-02-11 RX ADMIN — Medication 1 MILLIGRAM(S): at 12:15

## 2018-02-11 RX ADMIN — Medication 2 PUFF(S): at 15:50

## 2018-02-11 RX ADMIN — DAPTOMYCIN 125.6 MILLIGRAM(S): 500 INJECTION, POWDER, LYOPHILIZED, FOR SOLUTION INTRAVENOUS at 14:48

## 2018-02-11 RX ADMIN — Medication 1 TABLET(S): at 09:27

## 2018-02-11 RX ADMIN — Medication 1 MILLIGRAM(S): at 22:47

## 2018-02-11 RX ADMIN — PIPERACILLIN AND TAZOBACTAM 25 GRAM(S): 4; .5 INJECTION, POWDER, LYOPHILIZED, FOR SOLUTION INTRAVENOUS at 17:43

## 2018-02-11 RX ADMIN — PIPERACILLIN AND TAZOBACTAM 25 GRAM(S): 4; .5 INJECTION, POWDER, LYOPHILIZED, FOR SOLUTION INTRAVENOUS at 10:15

## 2018-02-11 RX ADMIN — Medication 2 PUFF(S): at 03:46

## 2018-02-11 RX ADMIN — Medication 125 MILLIGRAM(S): at 06:03

## 2018-02-11 RX ADMIN — Medication 2 PUFF(S): at 22:12

## 2018-02-11 RX ADMIN — BUDESONIDE AND FORMOTEROL FUMARATE DIHYDRATE 2 PUFF(S): 160; 4.5 AEROSOL RESPIRATORY (INHALATION) at 22:12

## 2018-02-11 RX ADMIN — CHLORHEXIDINE GLUCONATE 1 APPLICATION(S): 213 SOLUTION TOPICAL at 12:15

## 2018-02-11 RX ADMIN — PANTOPRAZOLE SODIUM 40 MILLIGRAM(S): 20 TABLET, DELAYED RELEASE ORAL at 12:15

## 2018-02-11 RX ADMIN — Medication 1 TABLET(S): at 17:43

## 2018-02-11 RX ADMIN — Medication 1 TABLET(S): at 12:15

## 2018-02-11 RX ADMIN — OXYCODONE HYDROCHLORIDE 10 MILLIGRAM(S): 5 TABLET ORAL at 22:59

## 2018-02-11 RX ADMIN — Medication 1 TABLET(S): at 20:54

## 2018-02-11 NOTE — CHART NOTE - NSCHARTNOTEFT_GEN_A_CORE
Patient is a 47y old  Male who presents with a chief complaint of acute on chronic respiratory distress (09 Feb 2018 19:01)    Called by RN to evaluate pt. with c/o shortness of breath while on vent     Vital Signs Last 24 Hrs  T(C): 37.1 (10 Feb 2018 22:03), Max: 37.2 (10 Feb 2018 17:04)  T(F): 98.8 (10 Feb 2018 22:03), Max: 99 (10 Feb 2018 17:04)  HR: 101 (10 Feb 2018 22:42) (98 - 119)  BP: 127/83 (10 Feb 2018 22:03) (123/91 - 135/95)  BP(mean): --  RR: 18 (10 Feb 2018 22:03) (17 - 18)  SpO2: 100% (10 Feb 2018 22:42) (98% - 100%)  CARDIAC MARKERS ( 09 Feb 2018 17:49 )  x     / < 0.06 ng/mL / 120 u/L / 1.80 ng/mL / x          PT/INR - ( 09 Feb 2018 17:49 )   PT: 14.8 SEC;   INR: 1.28          PTT - ( 09 Feb 2018 17:49 )  PTT:30.7 SEC                        7.3    12.21 )-----------( 157      ( 10 Feb 2018 06:05 )             23.7     02-10    134<L>  |  92<L>  |  17  ----------------------------<  103<H>  5.2   |  32<H>  |  0.75    Ca    10.3      10 Feb 2018 06:05  Phos  3.1     02-10  Mg     1.6     02-10    TPro  7.2  /  Alb  2.9<L>  /  TBili  0.3  /  DBili  x   /  AST  14  /  ALT  21  /  AlkPhos  97  02-10    LIVER FUNCTIONS - ( 10 Feb 2018 06:05 )  Alb: 2.9 g/dL / Pro: 7.2 g/dL / ALK PHOS: 97 u/L / ALT: 21 u/L / AST: 14 u/L / GGT: x                                         PHYSICAL EXAM:    GENERAL: well groomed, well-developed  NERVOUS SYSTEM:  Alert & Oriented X3, G  CHEST/LUNG: Breathe sounds clear on right. Diminished on the left. No rales, rhonchi, wheezing, or rubs  HEART: Regular rate and rhythm  ABDOMEN: Soft, Nontender, Nondistended; Bowel sounds present        Assessment:     47M with past medical health history significant for HTN, PFO, sarcoidosis c/b aspergillosis asthma/COPD, s/p CVA w/ L hemiplegia ((9/2017), pAfib (not on A/C 2/2 recurrent hemoptysis), s/p emergency L upper lobectomy for hemoptysis requiring IR embolization of L bronchial artery (11/2017) , s/p tracheostomy (11/24/17) and PEG placement (12/6/18) now returns from [facility] w/ acute on chronic respiratory failure, tachycardia and fevers x 1 week. Admitted to [facility] with sepsis likely 2/2 HCAP now with subjective shortness of breath and inability to hold tidal volume (dropping to 0).      Plan:  - Pt suctioned w/ very little secretions noted by RN  - Pt given Atrovent puffs through vent  - FiO2 increased all with no improvement.   - MICU Dr ROSAS called to consult      - Cuff over inflated with subjective improvement of shortness of breath and tidal volume > 500 maintained. Leak noted     - Microclave placed at the end of cuff to prevent leaking      Pt now resting comfortably. Will continue to monitor

## 2018-02-11 NOTE — PROGRESS NOTE ADULT - SUBJECTIVE AND OBJECTIVE BOX
CHIEF COMPLAINT:  Patient is a 47y old  Male who presents with a chief complaint of Mode: AC/ CMV (Assist Control/ Continuous Mandatory Ventilation), RR (machine): 16, TV (machine): 500, FiO2: 60, PEEP: 5, MAP: 16, PIP: 28  Interval Events:    REVIEW OF SYSTEMS:  Constitutional:   Eyes:  ENT:  CV:  Resp:  GI:  :  MSK:  Integumentary:  Neurological:  Psychiatric:  Endocrine:  Hematologic/Lymphatic:  Allergic/Immunologic:  [ ] All other systems negative  [ ] Unable to assess ROS because ________    OBJECTIVE:  ICU Vital Signs Last 24 Hrs  T(C): 36.8 (2018 02:15), Max: 37.2 (10 Feb 2018 17:04)  T(F): 98.3 (2018 02:15), Max: 99 (10 Feb 2018 17:04)  HR: 109 (2018 03:46) (98 - 119)  BP: 139/89 (2018 02:15) (123/91 - 139/89)  BP(mean): --  ABP: --  ABP(mean): --  RR: 18 (2018 02:15) (18 - 18)  SpO2: 100% (2018 03:46) (98% - 100%)    Mode: AC/ CMV (Assist Control/ Continuous Mandatory Ventilation), RR (machine): 16, TV (machine): 500, FiO2: 60, PEEP: 5, MAP: 16, PIP: 28    02-10 @ 07:01  -  02-11 @ 07:00  --------------------------------------------------------  IN: 0 mL / OUT: 200 mL / NET: -200 mL      CAPILLARY BLOOD GLUCOSE          PHYSICAL EXAM:  General:   HEENT:   Lymph Nodes:  Neck:   Respiratory:   Cardiovascular:   Abdomen:   Extremities:   Skin:   Neurological:  Psychiatry:    HOSPITAL MEDICATIONS:  MEDICATIONS  (STANDING):  ALBUTerol    90 MICROgram(s) HFA Inhaler 2 Puff(s) Inhalation every 6 hours  buDESOnide 160 MICROgram(s)/formoterol 4.5 MICROgram(s) Inhaler 2 Puff(s) Inhalation two times a day  chlorhexidine 4% Liquid 1 Application(s) Topical daily  DAPTOmycin IVPB      DAPTOmycin IVPB 640 milliGRAM(s) IV Intermittent every 24 hours  docusate sodium Liquid 10 milliGRAM(s) Oral daily  enoxaparin Injectable 40 milliGRAM(s) SubCutaneous daily  fentaNYL   Patch  12 MICROgram(s)/Hr 1 Patch Transdermal every 72 hours  folic acid 1 milliGRAM(s) Oral daily  ipratropium 17 MICROgram(s) HFA Inhaler 2 Puff(s) Inhalation every 6 hours  lactobacillus acidophilus 1 Tablet(s) Oral four times a day with meals  methylPREDNISolone sodium succinate Injectable 40 milliGRAM(s) IV Push two times a day  pantoprazole  Injectable 40 milliGRAM(s) IV Push daily  piperacillin/tazobactam IVPB. 3.375 Gram(s) IV Intermittent every 8 hours  senna Syrup 10 milliLiter(s) Oral at bedtime  vancomycin    Solution 125 milliGRAM(s) Oral two times a day    MEDICATIONS  (PRN):  acetaminophen    Suspension 650 milliGRAM(s) Oral every 6 hours PRN For Temp greater than 38 C (100.4 F)  acetaminophen    Suspension 650 milliGRAM(s) Oral every 6 hours PRN mild and moderate and severe pain  calamine Lotion 1 Application(s) Topical every 4 hours PRN Rash and/or Itching  LORazepam     Tablet 1 milliGRAM(s) Oral three times a day PRN Anxiety  oxyCODONE    IR 10 milliGRAM(s) Oral every 8 hours PRN Moderate Pain (4 - 6)      LABS:                        7.3    13.99 )-----------( 197      ( 2018 05:20 )             23.4     02-11    139  |  96<L>  |  23  ----------------------------<  160<H>  4.4   |  34<H>  |  0.63    Ca    10.2      2018 05:20  Phos  3.1     02-10  Mg     1.6     02-10    TPro  7.2  /  Alb  2.9<L>  /  TBili  0.3  /  DBili  x   /  AST  14  /  ALT  21  /  AlkPhos  97  02-10    PT/INR - ( 2018 17:49 )   PT: 14.8 SEC;   INR: 1.28          PTT - ( 2018 17:49 )  PTT:30.7 SEC  Urinalysis Basic - ( 2018 18:47 )    Color: YELLOW / Appearance: HAZY / S.026 / pH: 5.5  Gluc: NEGATIVE / Ketone: NEGATIVE  / Bili: NEGATIVE / Urobili: NORMAL mg/dL   Blood: LARGE / Protein: 100 mg/dL / Nitrite: NEGATIVE   Leuk Esterase: NEGATIVE / RBC: >50 / WBC 2-5   Sq Epi: OCC / Non Sq Epi: x / Bacteria: x        Venous Blood Gas:   @ 17:49  7.27/92/52/35/79.1  VBG Lactate: 1.5      MICROBIOLOGY:     RADIOLOGY:  [ ] Reviewed and interpreted by me    PULMONARY FUNCTION TESTS:    EKG:    I&O's Summary    10 Feb 2018 07:01  -  2018 07:00  --------------------------------------------------------  IN: 0 mL / OUT: 200 mL / NET: -200 mL CHIEF COMPLAINT:  Patient is a 47y old  Male who presents with Fever and acute on chronic resp failure    Mode: AC/ CMV (Assist Control/ Continuous Mandatory Ventilation), RR (machine): 16, TV (machine): 500, FiO2: 60, PEEP: 5, MAP: 16, PIP: 28  Interval Events:    REVIEW OF SYSTEMS:  Constitutional: feels better, some cough  CV: Denies  Resp: Denies  GI: Denies  : Denies   [X ] All other systems negative  [ ] Unable to assess ROS because ________    OBJECTIVE:  ICU Vital Signs Last 24 Hrs  T(C): 36.8 (2018 02:15), Max: 37.2 (10 Feb 2018 17:04)  T(F): 98.3 (2018 02:15), Max: 99 (10 Feb 2018 17:04)  HR: 109 (2018 03:46) (98 - 119)  BP: 139/89 (2018 02:15) (123/91 - 139/89)  BP(mean): --  ABP: --  ABP(mean): --  RR: 18 (2018 02:15) (18 - 18)  SpO2: 100% (2018 03:46) (98% - 100%)    Mode: AC/ CMV (Assist Control/ Continuous Mandatory Ventilation), RR (machine): 16, TV (machine): 500, FiO2: 60, PEEP: 5, MAP: 16, PIP: 28    02-10 @ 07:01  -  02-11 @ 07:00  --------------------------------------------------------  IN: 0 mL / OUT: 200 mL / NET: -200 mL      CAPILLARY BLOOD GLUCOSE    HOSPITAL MEDICATIONS:  MEDICATIONS  (STANDING):  ALBUTerol    90 MICROgram(s) HFA Inhaler 2 Puff(s) Inhalation every 6 hours  buDESOnide 160 MICROgram(s)/formoterol 4.5 MICROgram(s) Inhaler 2 Puff(s) Inhalation two times a day  chlorhexidine 4% Liquid 1 Application(s) Topical daily  DAPTOmycin IVPB      DAPTOmycin IVPB 640 milliGRAM(s) IV Intermittent every 24 hours  docusate sodium Liquid 10 milliGRAM(s) Oral daily  enoxaparin Injectable 40 milliGRAM(s) SubCutaneous daily  fentaNYL   Patch  12 MICROgram(s)/Hr 1 Patch Transdermal every 72 hours  folic acid 1 milliGRAM(s) Oral daily  ipratropium 17 MICROgram(s) HFA Inhaler 2 Puff(s) Inhalation every 6 hours  lactobacillus acidophilus 1 Tablet(s) Oral four times a day with meals  methylPREDNISolone sodium succinate Injectable 40 milliGRAM(s) IV Push two times a day  pantoprazole  Injectable 40 milliGRAM(s) IV Push daily  piperacillin/tazobactam IVPB. 3.375 Gram(s) IV Intermittent every 8 hours  senna Syrup 10 milliLiter(s) Oral at bedtime  vancomycin    Solution 125 milliGRAM(s) Oral two times a day    MEDICATIONS  (PRN):  acetaminophen    Suspension 650 milliGRAM(s) Oral every 6 hours PRN For Temp greater than 38 C (100.4 F)  acetaminophen    Suspension 650 milliGRAM(s) Oral every 6 hours PRN mild and moderate and severe pain  calamine Lotion 1 Application(s) Topical every 4 hours PRN Rash and/or Itching  LORazepam     Tablet 1 milliGRAM(s) Oral three times a day PRN Anxiety  oxyCODONE    IR 10 milliGRAM(s) Oral every 8 hours PRN Moderate Pain (4 - 6)      LABS:                        7.3    13.99 )-----------( 197      ( 2018 05:20 )             23.4     02-11    139  |  96<L>  |  23  ----------------------------<  160<H>  4.4   |  34<H>  |  0.63    Ca    10.2      2018 05:20  Phos  3.1     02-10  Mg     1.6     02-10    TPro  7.2  /  Alb  2.9<L>  /  TBili  0.3  /  DBili  x   /  AST  14  /  ALT  21  /  AlkPhos  97  02-10    PT/INR - ( 2018 17:49 )   PT: 14.8 SEC;   INR: 1.28          PTT - ( 2018 17:49 )  PTT:30.7 SEC  Urinalysis Basic - ( 2018 18:47 )    Color: YELLOW / Appearance: HAZY / S.026 / pH: 5.5  Gluc: NEGATIVE / Ketone: NEGATIVE  / Bili: NEGATIVE / Urobili: NORMAL mg/dL   Blood: LARGE / Protein: 100 mg/dL / Nitrite: NEGATIVE   Leuk Esterase: NEGATIVE / RBC: >50 / WBC 2-5   Sq Epi: OCC / Non Sq Epi: x / Bacteria: x        Venous Blood Gas:   @ 17:49  7.27/92/52/35/79.1  VBG Lactate: 1.5      MICROBIOLOGY:     RADIOLOGY:  [ ] Reviewed and interpreted by me    PULMONARY FUNCTION TESTS:    EKG:    I&O's Summary    10 Feb 2018 07:01  -  2018 07:00  --------------------------------------------------------  IN: 0 mL / OUT: 200 mL / NET: -200 mL

## 2018-02-11 NOTE — CONSULT NOTE ADULT - SUBJECTIVE AND OBJECTIVE BOX
Patient is a 47y old  Male who presents with a chief complaint of acute on chronic respiratory distress (09 Feb 2018 19:01)    HPI:  47M with h/o HTN, PFO,sarcoidosis c/b aspergilosis (s/p Voriconazole course in 11/2017), asthma/COPD, s/p CVA w/ L hemiplegia ((9/2017), pAfib (not on A/C 2/2 recurrent hemoptysis), s/p emergency L upper lobectomy for hemoptysis requiring IR emoblization of L bronchial artery (11/2017) complicated by pneumothorax requiring chest tube, s/p percutaneous tracheotomy by MICU (11/24/17) for acute hypoxic respiratory failure and PEG placement (12/6/18). His course was further complicated by a broncho-pulmonary fistula, thyroid arterial pseudoaneurysm and recent C. diff colitis (1/2018 s/p course of vanco/flagyl via PEG). He is admitted to inpatient 2/9/18 for tachycardia and fevers, with CXR concerning for PNA. Patient initially placed on cefepime, as inpatient is on vanc and zosyn.     ENT consulted for low tidal volumes on ventilator noted last night. Patient has been on a weaning protocol since 1/16/18 and does well on intermittent CPAP trials with decreasing ventilator settings. This has been on hold since 2/5 in light of his respiratory illness. According to primary team, patient without desaturations and no evidence of air leak (balloon cuff holding air well), however, tidal volumes will decrease and patient subjectively becomes short of breath. This resolves intermittently with MICU attending placing additional 13cc of air in the cuff earlier today. MICU attending planning to discuss with Pulmonary for change of perc trach in OR tomorrow. Patient with history of difficult trach changes in the past, does not have trach removed at bedside in respiratory unit - previously had trach changed to another 6 PERC trach in MICU but with difficulty and had to be bagged for 2 hrs due to difficulty.       PAST MEDICAL & SURGICAL HISTORY:  History of pneumothorax: History of 3 prior pneumonthoracies  COPD (chronic obstructive pulmonary disease)  Asthma  Hypertension  Sarcoidosis  History of thoracotomy: 9/24/17 Left thoracotomy, Left upper lobectomy   9/25/17 Reop left thoracotomy, evacuation of left hemothorax    FAMILY HISTORY:  Family history of pancreatic cancer (Father)  Family history of essential hypertension (Father)      MEDICATIONS  (STANDING):  ALBUTerol    90 MICROgram(s) HFA Inhaler 2 Puff(s) Inhalation every 6 hours  buDESOnide 160 MICROgram(s)/formoterol 4.5 MICROgram(s) Inhaler 2 Puff(s) Inhalation two times a day  chlorhexidine 4% Liquid 1 Application(s) Topical daily  DAPTOmycin IVPB      DAPTOmycin IVPB 640 milliGRAM(s) IV Intermittent every 24 hours  docusate sodium Liquid 10 milliGRAM(s) Oral daily  enoxaparin Injectable 40 milliGRAM(s) SubCutaneous daily  fentaNYL   Patch  12 MICROgram(s)/Hr 1 Patch Transdermal every 72 hours  folic acid 1 milliGRAM(s) Oral daily  ipratropium 17 MICROgram(s) HFA Inhaler 2 Puff(s) Inhalation every 6 hours  lactobacillus acidophilus 1 Tablet(s) Oral four times a day with meals  methylPREDNISolone sodium succinate Injectable 40 milliGRAM(s) IV Push daily  pantoprazole  Injectable 40 milliGRAM(s) IV Push daily  piperacillin/tazobactam IVPB. 3.375 Gram(s) IV Intermittent every 8 hours  senna Syrup 10 milliLiter(s) Oral at bedtime  vancomycin    Solution 125 milliGRAM(s) Oral two times a day    MEDICATIONS  (PRN):  acetaminophen    Suspension 650 milliGRAM(s) Oral every 6 hours PRN For Temp greater than 38 C (100.4 F)  acetaminophen    Suspension 650 milliGRAM(s) Oral every 6 hours PRN mild and moderate and severe pain  ALBUTerol    90 MICROgram(s) HFA Inhaler 2 Puff(s) Inhalation every 6 hours PRN Bronchospasm  calamine Lotion 1 Application(s) Topical every 4 hours PRN Rash and/or Itching  LORazepam     Tablet 1 milliGRAM(s) Oral three times a day PRN Anxiety  oxyCODONE    IR 10 milliGRAM(s) Oral every 8 hours PRN Moderate Pain (4 - 6)    Allergies  No Known Allergies    REVIEW OF SYSTEMS:  negative except per HPI                         7.3    13.99 )-----------( 197      ( 11 Feb 2018 05:20 )             23.4     02-11    139  |  96<L>  |  23  ----------------------------<  160<H>  4.4   |  34<H>  |  0.63    Ca    10.2      11 Feb 2018 05:20  Phos  3.1     02-10  Mg     1.6     02-10    TPro  7.2  /  Alb  2.9<L>  /  TBili  0.3  /  DBili  x   /  AST  14  /  ALT  21  /  AlkPhos  97  02-10    Vital Signs Last 24 Hrs  T(C): 36.9 (11 Feb 2018 17:41), Max: 36.9 (11 Feb 2018 17:41)  T(F): 98.5 (11 Feb 2018 17:41), Max: 98.5 (11 Feb 2018 17:41)  HR: 99 (11 Feb 2018 22:14) (15 - 111)  BP: 113/82 (11 Feb 2018 17:41) (113/82 - 139/89)  BP(mean): --  RR: 16 (11 Feb 2018 17:41) (16 - 26)  SpO2: 113% (11 Feb 2018 22:14) (100% - 113%)  Mode: AC/ CMV (Assist Control/ Continuous Mandatory Ventilation)  RR (machine): 16  TV (machine): 500  FiO2: 40  PEEP: 5  PS: 10  MAP: 11  PIP: 26      PHYSICAL EXAM:  Constitutional Normal: well nourished, well developed, non-dysmorphic, no acute distress  Breathing comfortably on mech vent, PEEP 5, FiO2 40%   No elevated peak pressures seen on ventilator, TVs 500-700s.   Cuff inflated with air. Able to withdraw 20cc of air from the balloon cuff. Reinflated with 13cc of air until air leak disappeared from ventilator.   EOMI, PERRLA  Face symmetric, Cn VII intact  Voice: aphonic connected to vent   External Nose:  Normal, no structural deformities				  Oral Cavity:  clear   Neck: No palpable lymphadenopathy  6 PERC trach in place, stoma tight without evidence of granulation tissue at the level of the skin. Drain sponge has been placed underneath trach phalange and pulls trach slightly out of the neck.   Tracheostomy Site: evidence of skin breakdown inferior to tracheal stoma. Skin barrier film placed over this area and mepilex dressing placed       Fiberoptic Tracheoscopy - scope through trach without any evidence of granulation tissue, no fistula visible, patent and clear to mel. No pus seen in either mainstem bronchi.

## 2018-02-11 NOTE — PROGRESS NOTE ADULT - PROBLEM SELECTOR PLAN 2
- now improved  - tolerating vent settings: 500 / 16/Fio2 - 40%   - Tidal voulume stable with cuff overinflated and Microclave in place. MICU      resident aware of need for trach exchange. Will change tomorrow as patient is     currently stable.     - chest PT  - trach care daily  - suction prn

## 2018-02-11 NOTE — PROGRESS NOTE ADULT - ATTENDING COMMENTS
I have seen and examined the patient. I agree with the above history, physical exam, and plan of care except for as detailed below.    Sarcoidosis, complicated pulmonary history s/p trach on mechanical ventilation presenting with acute on chronic respiratory failure likely secondary to PNA. Continues to improve on abx. Issue with trach overnight. Currently ventilating well, will plan for trach exchange possibly tomorrow. Blood cultures now growing VRE.    Appreciate ID consult. Continue Dapto/Zosyn. Hyponatremia resolved.    Send cultures, continue abx, free water restriction for hyponatremia. Decrease solumedrol to daily.

## 2018-02-11 NOTE — PROGRESS NOTE ADULT - PROBLEM SELECTOR PLAN 1
- Afebrile  - leukocytosis downtrending  - cont with broad spectrum: Dapto / Vanco/Zosyn  - RVP negative  - check legionella  - blood cultures pending  - urine culture pending  - monitor fever and WBC curve

## 2018-02-11 NOTE — PROGRESS NOTE ADULT - SUBJECTIVE AND OBJECTIVE BOX
CHACKOOSCAR 47y [id number]    Patient is a 47y old  Male who presents with a chief complaint of acute on chronic respiratory distress (2018 19:01)      Follow Up/CC:  bacteremia    Interval History/ROS: slightly better today    Allergies    No Known Allergies    Intolerances        ANTIMICROBIALS:  DAPTOmycin IVPB    DAPTOmycin IVPB 640 every 24 hours  piperacillin/tazobactam IVPB. 3.375 every 8 hours  vancomycin    Solution 125 two times a day      MEDICATIONS  (STANDING):  ALBUTerol    90 MICROgram(s) HFA Inhaler 2 Puff(s) Inhalation every 6 hours  buDESOnide 160 MICROgram(s)/formoterol 4.5 MICROgram(s) Inhaler 2 Puff(s) Inhalation two times a day  chlorhexidine 4% Liquid 1 Application(s) Topical daily  DAPTOmycin IVPB      DAPTOmycin IVPB 640 milliGRAM(s) IV Intermittent every 24 hours  docusate sodium Liquid 10 milliGRAM(s) Oral daily  enoxaparin Injectable 40 milliGRAM(s) SubCutaneous daily  fentaNYL   Patch  12 MICROgram(s)/Hr 1 Patch Transdermal every 72 hours  folic acid 1 milliGRAM(s) Oral daily  ipratropium 17 MICROgram(s) HFA Inhaler 2 Puff(s) Inhalation every 6 hours  lactobacillus acidophilus 1 Tablet(s) Oral four times a day with meals  methylPREDNISolone sodium succinate Injectable 40 milliGRAM(s) IV Push daily  pantoprazole  Injectable 40 milliGRAM(s) IV Push daily  piperacillin/tazobactam IVPB. 3.375 Gram(s) IV Intermittent every 8 hours  senna Syrup 10 milliLiter(s) Oral at bedtime  vancomycin    Solution 125 milliGRAM(s) Oral two times a day    MEDICATIONS  (PRN):  acetaminophen    Suspension 650 milliGRAM(s) Oral every 6 hours PRN For Temp greater than 38 C (100.4 F)  acetaminophen    Suspension 650 milliGRAM(s) Oral every 6 hours PRN mild and moderate and severe pain  ALBUTerol    90 MICROgram(s) HFA Inhaler 2 Puff(s) Inhalation every 6 hours PRN Bronchospasm  calamine Lotion 1 Application(s) Topical every 4 hours PRN Rash and/or Itching  LORazepam     Tablet 1 milliGRAM(s) Oral three times a day PRN Anxiety  oxyCODONE    IR 10 milliGRAM(s) Oral every 8 hours PRN Moderate Pain (4 - 6)        Vital Signs Last 24 Hrs  T(C): 36.6 (2018 10:13), Max: 37.2 (10 Feb 2018 17:04)  T(F): 97.8 (2018 10:13), Max: 99 (10 Feb 2018 17:04)  HR: 100 (2018 11:39) (96 - 119)  BP: 130/96 (2018 10:13) (123/81 - 139/89)  BP(mean): --  RR: 16 (2018 10:13) (16 - 20)  SpO2: 100% (2018 11:39) (99% - 100%)    CBC Full  -  ( 2018 05:20 )  WBC Count : 13.99 K/uL  Hemoglobin : 7.3 g/dL  Hematocrit : 23.4 %  Platelet Count - Automated : 197 K/uL  Mean Cell Volume : 90.0 fL  Mean Cell Hemoglobin : 28.1 pg  Mean Cell Hemoglobin Concentration : 31.2 %  Auto Neutrophil # : x  Auto Lymphocyte # : x  Auto Monocyte # : x  Auto Eosinophil # : x  Auto Basophil # : x  Auto Neutrophil % : x  Auto Lymphocyte % : x  Auto Monocyte % : x  Auto Eosinophil % : x  Auto Basophil % : x    02-11    139  |  96<L>  |  23  ----------------------------<  160<H>  4.4   |  34<H>  |  0.63    Ca    10.2      2018 05:20  Phos  3.1     02-10  Mg     1.6     02-10    TPro  7.2  /  Alb  2.9<L>  /  TBili  0.3  /  DBili  x   /  AST  14  /  ALT  21  /  AlkPhos  97  02-10    LIVER FUNCTIONS - ( 10 Feb 2018 06:05 )  Alb: 2.9 g/dL / Pro: 7.2 g/dL / ALK PHOS: 97 u/L / ALT: 21 u/L / AST: 14 u/L / GGT: x           Urinalysis Basic - ( 2018 18:47 )    Color: YELLOW / Appearance: HAZY / S.026 / pH: 5.5  Gluc: NEGATIVE / Ketone: NEGATIVE  / Bili: NEGATIVE / Urobili: NORMAL mg/dL   Blood: LARGE / Protein: 100 mg/dL / Nitrite: NEGATIVE   Leuk Esterase: NEGATIVE / RBC: >50 / WBC 2-5   Sq Epi: OCC / Non Sq Epi: x / Bacteria: x        MICROBIOLOGY:  SPUTUM  02-10-18 --  --  --      URINE MIDSTREAM  18 --  --  --      BLOOD PERIPHERAL  18 --  --  --      BLOOD  18 --  --  BLOOD CULTURE PCR  Gram Pos Cocci to be IDed              v            RADIOLOGY

## 2018-02-11 NOTE — CHART NOTE - NSCHARTNOTEFT_GEN_A_CORE
Patient is a 47y old  Male who presents with a chief complaint of acute on chronic respiratory distress (09 Feb 2018 19:01)    Called by RN to evaluate pt. with c/o   Vital Signs Last 24 Hrs  T(C): 36.9 (11 Feb 2018 17:41), Max: 36.9 (11 Feb 2018 17:41)  T(F): 98.5 (11 Feb 2018 17:41), Max: 98.5 (11 Feb 2018 17:41)  HR: 99 (11 Feb 2018 22:14) (15 - 111)  BP: 113/82 (11 Feb 2018 17:41) (113/82 - 139/89)  BP(mean): --  RR: 16 (11 Feb 2018 17:41) (16 - 26)  SpO2: 113% (11 Feb 2018 22:14) (100% - 113%)  CARDIAC MARKERS ( 11 Feb 2018 05:20 )  x     / x     / 27 u/L / x     / x                                  7.3    13.99 )-----------( 197      ( 11 Feb 2018 05:20 )             23.4     02-11    139  |  96<L>  |  23  ----------------------------<  160<H>  4.4   |  34<H>  |  0.63    Ca    10.2      11 Feb 2018 05:20  Phos  3.1     02-10  Mg     1.6     02-10    TPro  7.2  /  Alb  2.9<L>  /  TBili  0.3  /  DBili  x   /  AST  14  /  ALT  21  /  AlkPhos  97  02-10    LIVER FUNCTIONS - ( 10 Feb 2018 06:05 )  Alb: 2.9 g/dL / Pro: 7.2 g/dL / ALK PHOS: 97 u/L / ALT: 21 u/L / AST: 14 u/L / GGT: x                                   CAPILLARY BLOOD GLUCOSE        acetaminophen    Suspension 650 milliGRAM(s) Oral every 6 hours PRN  acetaminophen    Suspension 650 milliGRAM(s) Oral every 6 hours PRN  ALBUTerol    90 MICROgram(s) HFA Inhaler 2 Puff(s) Inhalation every 6 hours  ALBUTerol    90 MICROgram(s) HFA Inhaler 2 Puff(s) Inhalation every 6 hours PRN  buDESOnide 160 MICROgram(s)/formoterol 4.5 MICROgram(s) Inhaler 2 Puff(s) Inhalation two times a day  calamine Lotion 1 Application(s) Topical every 4 hours PRN  chlorhexidine 4% Liquid 1 Application(s) Topical daily  DAPTOmycin IVPB      DAPTOmycin IVPB 640 milliGRAM(s) IV Intermittent every 24 hours  docusate sodium Liquid 10 milliGRAM(s) Oral daily  enoxaparin Injectable 40 milliGRAM(s) SubCutaneous daily  fentaNYL   Patch  12 MICROgram(s)/Hr 1 Patch Transdermal every 72 hours  folic acid 1 milliGRAM(s) Oral daily  ipratropium 17 MICROgram(s) HFA Inhaler 2 Puff(s) Inhalation every 6 hours  lactobacillus acidophilus 1 Tablet(s) Oral four times a day with meals  LORazepam     Tablet 1 milliGRAM(s) Oral three times a day PRN  methylPREDNISolone sodium succinate Injectable 40 milliGRAM(s) IV Push daily  oxyCODONE    IR 10 milliGRAM(s) Oral every 8 hours PRN  pantoprazole  Injectable 40 milliGRAM(s) IV Push daily  piperacillin/tazobactam IVPB. 3.375 Gram(s) IV Intermittent every 8 hours  senna Syrup 10 milliLiter(s) Oral at bedtime  vancomycin    Solution 125 milliGRAM(s) Oral two times a day      REVIEW OF SYSTEMS:    RESPIRATORY: No cough, wheezing, chills or hemoptysis; No shortness of breath  CARDIOVASCULAR: No chest pain, palpitations, dizziness, or leg swelling  GASTROINTESTINAL: No abdominal or epigastric pain. No nausea, vomiting, or hematemesis; No diarrhea or constipation. No melena or hematochezia.  GENITOURINARY: No dysuria, frequency, hematuria, or incontinence  NEUROLOGICAL: No headaches, memory loss, loss of strength, numbness, or tremors    PHYSICAL EXAM:    GENERAL: NAD, well-groomed, well-developed  NERVOUS SYSTEM:  Alert & Oriented X3, Good concentration; Motor Strength 5/5 B/L upper and lower extremities; DTRs 2+ intact and symmetric  CHEST/LUNG: Clear to percussion bilaterally; No rales, rhonchi, wheezing, or rubs  HEART: Regular rate and rhythm; No murmurs, rubs, or gallops  ABDOMEN: Soft, Nontender, Nondistended; Bowel sounds present    RADIOLOGY :      Assessment: Patient 47y with Supraventricular tachycardia  History of pneumothorax  COPD (chronic obstructive pulmonary disease)  Asthma  Hypertension  Sarcoidosis  History of thoracotomy  No significant past surgical history      Plan:  - Continue current treatment  - Follow up labs  - D/w                       aware and agree with the plan  - Will continue to follow up Patient is a 47y old  Male who presents with a chief complaint of acute on chronic respiratory distress (09 Feb 2018 19:01)    Called by RN to evaluate pt. with c/o   Vital Signs Last 24 Hrs  T(C): 36.9 (11 Feb 2018 17:41), Max: 36.9 (11 Feb 2018 17:41)  T(F): 98.5 (11 Feb 2018 17:41), Max: 98.5 (11 Feb 2018 17:41)  HR: 99 (11 Feb 2018 22:14) (15 - 111)  BP: 113/82 (11 Feb 2018 17:41) (113/82 - 139/89)  BP(mean): --  RR: 16 (11 Feb 2018 17:41) (16 - 26)  SpO2: 113% (11 Feb 2018 22:14) (100% - 113%)  CARDIAC MARKERS ( 11 Feb 2018 05:20 )  x     / x     / 27 u/L / x     / x                                  7.3    13.99 )-----------( 197      ( 11 Feb 2018 05:20 )             23.4     02-11    139  |  96<L>  |  23  ----------------------------<  160<H>  4.4   |  34<H>  |  0.63    Ca    10.2      11 Feb 2018 05:20  Phos  3.1     02-10  Mg     1.6     02-10    TPro  7.2  /  Alb  2.9<L>  /  TBili  0.3  /  DBili  x   /  AST  14  /  ALT  21  /  AlkPhos  97  02-10    LIVER FUNCTIONS - ( 10 Feb 2018 06:05 )  Alb: 2.9 g/dL / Pro: 7.2 g/dL / ALK PHOS: 97 u/L / ALT: 21 u/L / AST: 14 u/L / GGT: x                                         PHYSICAL EXAM:    GENERAL: well groomed, well-developed  NERVOUS SYSTEM:  Alert & Oriented X3,  CHEST/LUNG: Breathe sounds clear on right. Diminished on the left. No rales, rhonchi, wheezing, or rubs  HEART: Regular rate and rhythm  ABDOMEN: Soft, Nontender, Nondistended; Bowel sounds present        Assessment:     47M with past medical health history significant for HTN, PFO, sarcoidosis c/b aspergillosis asthma/COPD, s/p CVA w/ L hemiplegia ((9/2017), pAfib (not on A/C 2/2 recurrent hemoptysis), s/p emergency L upper lobectomy for hemoptysis requiring IR embolization of L bronchial artery (11/2017) , s/p tracheostomy (11/24/17) and PEG placement (12/6/18) now returns from [facility] w/ acute on chronic respiratory failure, tachycardia and fevers x 1 week. Admitted to [facility] with sepsis likely 2/2 HCAP now with subjective shortness of breath and inability to hold tidal volume dropping to 0 intermitently.      Plan:  - Pt suctioned w/ very little secretions noted by RN  - Pt given Atrovent puffs through vent  - FiO2 increased all with no improvement.   - MICU Dr Beck called to consult      - Cuff over inflated again w/ improvement. Microclave still in place      - Plan to possible exchange Trach Monday      - ENT consult for scope      Pt now resting comfortably. Will continue to monitor.

## 2018-02-11 NOTE — PROGRESS NOTE ADULT - ATTENDING COMMENTS
Andres Cope  Attending Physician   Division of Infectious Disease  Pager #[phone]  After 5pm/weekend or no response, call #[phone]

## 2018-02-12 DIAGNOSIS — Z22.322 CARRIER OR SUSPECTED CARRIER OF METHICILLIN RESISTANT STAPHYLOCOCCUS AUREUS: ICD-10-CM

## 2018-02-12 LAB
-  AMPICILLIN: SIGNIFICANT CHANGE UP
-  CEFAZOLIN: SIGNIFICANT CHANGE UP
-  CIPROFLOXACIN: SIGNIFICANT CHANGE UP
-  CLINDAMYCIN: SIGNIFICANT CHANGE UP
-  DAPTOMYCIN: SIGNIFICANT CHANGE UP
-  ERYTHROMYCIN: SIGNIFICANT CHANGE UP
-  GENTAMICIN 500: SIGNIFICANT CHANGE UP
-  GENTAMICIN: SIGNIFICANT CHANGE UP
-  LINEZOLID: SIGNIFICANT CHANGE UP
-  LINEZOLID: SIGNIFICANT CHANGE UP
-  MOXIFLOXACIN(AEROBIC): SIGNIFICANT CHANGE UP
-  OXACILLIN: SIGNIFICANT CHANGE UP
-  PENICILLIN: SIGNIFICANT CHANGE UP
-  RIFAMPIN.: SIGNIFICANT CHANGE UP
-  STREPTOMYCIN 1000: SIGNIFICANT CHANGE UP
-  TETRACYCLINE: SIGNIFICANT CHANGE UP
-  TRIMETHOPRIM/SULFAMETHOXAZOLE: SIGNIFICANT CHANGE UP
-  VANCOMYCIN: SIGNIFICANT CHANGE UP
-  VANCOMYCIN: SIGNIFICANT CHANGE UP
BACTERIA BLD CULT: SIGNIFICANT CHANGE UP
BACTERIA SPT RESP CULT: SIGNIFICANT CHANGE UP
BACTERIA SPT RESP CULT: SIGNIFICANT CHANGE UP
BUN SERPL-MCNC: 23 MG/DL — SIGNIFICANT CHANGE UP (ref 7–23)
CALCIUM SERPL-MCNC: 10 MG/DL — SIGNIFICANT CHANGE UP (ref 8.4–10.5)
CHLORIDE SERPL-SCNC: 101 MMOL/L — SIGNIFICANT CHANGE UP (ref 98–107)
CK SERPL-CCNC: 26 U/L — LOW (ref 30–200)
CO2 SERPL-SCNC: 36 MMOL/L — HIGH (ref 22–31)
CREAT SERPL-MCNC: 0.59 MG/DL — SIGNIFICANT CHANGE UP (ref 0.5–1.3)
ENTEROCOC DNA BLD POS QL NAA+NON-PROBE: SIGNIFICANT CHANGE UP
GLUCOSE SERPL-MCNC: 120 MG/DL — HIGH (ref 70–99)
GRAM STN SPT: SIGNIFICANT CHANGE UP
HCT VFR BLD CALC: 22.7 % — LOW (ref 39–50)
HGB BLD-MCNC: 7.2 G/DL — LOW (ref 13–17)
MCHC RBC-ENTMCNC: 29 PG — SIGNIFICANT CHANGE UP (ref 27–34)
MCHC RBC-ENTMCNC: 31.7 % — LOW (ref 32–36)
MCV RBC AUTO: 91.5 FL — SIGNIFICANT CHANGE UP (ref 80–100)
METHOD TYPE: SIGNIFICANT CHANGE UP
METHOD TYPE: SIGNIFICANT CHANGE UP
NRBC # FLD: 0 — SIGNIFICANT CHANGE UP
ORGANISM # SPEC MICROSCOPIC CNT: SIGNIFICANT CHANGE UP
PLATELET # BLD AUTO: 174 K/UL — SIGNIFICANT CHANGE UP (ref 150–400)
PMV BLD: 12.7 FL — SIGNIFICANT CHANGE UP (ref 7–13)
POTASSIUM SERPL-MCNC: 3.9 MMOL/L — SIGNIFICANT CHANGE UP (ref 3.5–5.3)
POTASSIUM SERPL-SCNC: 3.9 MMOL/L — SIGNIFICANT CHANGE UP (ref 3.5–5.3)
RBC # BLD: 2.48 M/UL — LOW (ref 4.2–5.8)
RBC # FLD: 13.4 % — SIGNIFICANT CHANGE UP (ref 10.3–14.5)
SODIUM SERPL-SCNC: 146 MMOL/L — HIGH (ref 135–145)
SPECIMEN SOURCE: SIGNIFICANT CHANGE UP
WBC # BLD: 9.54 K/UL — SIGNIFICANT CHANGE UP (ref 3.8–10.5)
WBC # FLD AUTO: 9.54 K/UL — SIGNIFICANT CHANGE UP (ref 3.8–10.5)

## 2018-02-12 PROCEDURE — 99233 SBSQ HOSP IP/OBS HIGH 50: CPT

## 2018-02-12 PROCEDURE — 99232 SBSQ HOSP IP/OBS MODERATE 35: CPT

## 2018-02-12 PROCEDURE — 74177 CT ABD & PELVIS W/CONTRAST: CPT | Mod: 26

## 2018-02-12 RX ADMIN — DAPTOMYCIN 125.6 MILLIGRAM(S): 500 INJECTION, POWDER, LYOPHILIZED, FOR SOLUTION INTRAVENOUS at 13:28

## 2018-02-12 RX ADMIN — FENTANYL CITRATE 1 PATCH: 50 INJECTION INTRAVENOUS at 21:45

## 2018-02-12 RX ADMIN — BUDESONIDE AND FORMOTEROL FUMARATE DIHYDRATE 2 PUFF(S): 160; 4.5 AEROSOL RESPIRATORY (INHALATION) at 09:01

## 2018-02-12 RX ADMIN — Medication 160 MILLIGRAM(S): at 17:51

## 2018-02-12 RX ADMIN — Medication 2 PUFF(S): at 21:34

## 2018-02-12 RX ADMIN — Medication 1 TABLET(S): at 12:09

## 2018-02-12 RX ADMIN — Medication 2 PUFF(S): at 15:33

## 2018-02-12 RX ADMIN — CHLORHEXIDINE GLUCONATE 1 APPLICATION(S): 213 SOLUTION TOPICAL at 12:09

## 2018-02-12 RX ADMIN — SENNA PLUS 10 MILLILITER(S): 8.6 TABLET ORAL at 21:44

## 2018-02-12 RX ADMIN — Medication 1 TABLET(S): at 17:51

## 2018-02-12 RX ADMIN — Medication 1 TABLET(S): at 21:44

## 2018-02-12 RX ADMIN — ENOXAPARIN SODIUM 40 MILLIGRAM(S): 100 INJECTION SUBCUTANEOUS at 12:09

## 2018-02-12 RX ADMIN — Medication 125 MILLIGRAM(S): at 17:51

## 2018-02-12 RX ADMIN — PIPERACILLIN AND TAZOBACTAM 25 GRAM(S): 4; .5 INJECTION, POWDER, LYOPHILIZED, FOR SOLUTION INTRAVENOUS at 09:25

## 2018-02-12 RX ADMIN — BUDESONIDE AND FORMOTEROL FUMARATE DIHYDRATE 2 PUFF(S): 160; 4.5 AEROSOL RESPIRATORY (INHALATION) at 21:34

## 2018-02-12 RX ADMIN — PANTOPRAZOLE SODIUM 40 MILLIGRAM(S): 20 TABLET, DELAYED RELEASE ORAL at 12:09

## 2018-02-12 RX ADMIN — Medication 2 PUFF(S): at 03:32

## 2018-02-12 RX ADMIN — Medication 125 MILLIGRAM(S): at 06:10

## 2018-02-12 RX ADMIN — Medication 1 MILLIGRAM(S): at 12:09

## 2018-02-12 RX ADMIN — Medication 1 TABLET(S): at 09:23

## 2018-02-12 RX ADMIN — PIPERACILLIN AND TAZOBACTAM 25 GRAM(S): 4; .5 INJECTION, POWDER, LYOPHILIZED, FOR SOLUTION INTRAVENOUS at 03:00

## 2018-02-12 RX ADMIN — Medication 1 MILLIGRAM(S): at 21:44

## 2018-02-12 RX ADMIN — Medication 2 PUFF(S): at 09:01

## 2018-02-12 RX ADMIN — Medication 40 MILLIGRAM(S): at 06:10

## 2018-02-12 NOTE — PROGRESS NOTE ADULT - ATTENDING COMMENTS
Andres Cope  Attending Physician   Division of Infectious Disease  Pager #[phone]  After 5pm/weekend or no response, call #[phone]

## 2018-02-12 NOTE — DIETITIAN INITIAL EVALUATION ADULT. - OTHER INFO
Nutrition consult received on 2/9/18 for pressure ulcer > stage 2. Pt. recently was in hospital , discharged to rehab. where pt. received PO - Soft to chew , thin liquids- pt. to take small bites / sips + EN - Two Ben HN  800 ml/d @ 100 ml from 8 PM - 4 AM AND pump flush @ 175 ml every one hr x9 per shift during feed schedule 8 PM - 4 AM . Pt. w/ stage2 pressure ulcer under trach and sacrum .

## 2018-02-12 NOTE — DIETITIAN INITIAL EVALUATION ADULT. - PERTINENT MEDS FT
Daptomycin, Vancomycin, Fentanyl, Lovenox, Colace, Senna, Protonix , Folic acid Lactobacillus acidophilus , Solu MEDROL, Zosyn

## 2018-02-12 NOTE — PROGRESS NOTE ADULT - SUBJECTIVE AND OBJECTIVE BOX
OSCAR CHACKO 47y [id number]    Patient is a 47y old  Male who presents with a chief complaint of acute on chronic respiratory distress (09 Feb 2018 19:01)      Follow Up/CC:  bacteremia    Interval History/ROS: increased secrestions    Allergies    No Known Allergies    Intolerances        ANTIMICROBIALS:  DAPTOmycin IVPB    DAPTOmycin IVPB 640 every 24 hours  trimethoprim  40 mG/sulfamethoxazole 200 mG Suspension 160 every 12 hours  vancomycin    Solution 125 two times a day      MEDICATIONS  (STANDING):  ALBUTerol    90 MICROgram(s) HFA Inhaler 2 Puff(s) Inhalation every 6 hours  buDESOnide 160 MICROgram(s)/formoterol 4.5 MICROgram(s) Inhaler 2 Puff(s) Inhalation two times a day  chlorhexidine 4% Liquid 1 Application(s) Topical daily  DAPTOmycin IVPB      DAPTOmycin IVPB 640 milliGRAM(s) IV Intermittent every 24 hours  docusate sodium Liquid 10 milliGRAM(s) Oral daily  enoxaparin Injectable 40 milliGRAM(s) SubCutaneous daily  fentaNYL   Patch  12 MICROgram(s)/Hr 1 Patch Transdermal every 72 hours  folic acid 1 milliGRAM(s) Oral daily  ipratropium 17 MICROgram(s) HFA Inhaler 2 Puff(s) Inhalation every 6 hours  lactobacillus acidophilus 1 Tablet(s) Oral four times a day with meals  methylPREDNISolone sodium succinate Injectable 40 milliGRAM(s) IV Push daily  pantoprazole  Injectable 40 milliGRAM(s) IV Push daily  senna Syrup 10 milliLiter(s) Oral at bedtime  trimethoprim  40 mG/sulfamethoxazole 200 mG Suspension 160 milliGRAM(s) Oral every 12 hours  vancomycin    Solution 125 milliGRAM(s) Oral two times a day    MEDICATIONS  (PRN):  acetaminophen    Suspension 650 milliGRAM(s) Oral every 6 hours PRN For Temp greater than 38 C (100.4 F)  acetaminophen    Suspension 650 milliGRAM(s) Oral every 6 hours PRN mild and moderate and severe pain  ALBUTerol    90 MICROgram(s) HFA Inhaler 2 Puff(s) Inhalation every 6 hours PRN Bronchospasm  calamine Lotion 1 Application(s) Topical every 4 hours PRN Rash and/or Itching  LORazepam     Tablet 1 milliGRAM(s) Oral three times a day PRN Anxiety  oxyCODONE    IR 10 milliGRAM(s) Oral every 8 hours PRN Moderate Pain (4 - 6)        Vital Signs Last 24 Hrs  T(C): 36.8 (12 Feb 2018 13:26), Max: 36.9 (11 Feb 2018 17:41)  T(F): 98.3 (12 Feb 2018 13:26), Max: 98.5 (11 Feb 2018 17:41)  HR: 86 (12 Feb 2018 13:26) (80 - 115)  BP: 129/92 (12 Feb 2018 13:26) (113/82 - 136/91)  BP(mean): --  RR: 16 (12 Feb 2018 13:26) (16 - 16)  SpO2: 100% (12 Feb 2018 13:26) (100% - 113%)    CBC Full  -  ( 12 Feb 2018 05:05 )  WBC Count : 9.54 K/uL  Hemoglobin : 7.2 g/dL  Hematocrit : 22.7 %  Platelet Count - Automated : 174 K/uL  Mean Cell Volume : 91.5 fL  Mean Cell Hemoglobin : 29.0 pg  Mean Cell Hemoglobin Concentration : 31.7 %  Auto Neutrophil # : x  Auto Lymphocyte # : x  Auto Monocyte # : x  Auto Eosinophil # : x  Auto Basophil # : x  Auto Neutrophil % : x  Auto Lymphocyte % : x  Auto Monocyte % : x  Auto Eosinophil % : x  Auto Basophil % : x    02-12    146<H>  |  101  |  23  ----------------------------<  120<H>  3.9   |  36<H>  |  0.59    Ca    10.0      12 Feb 2018 05:05            MICROBIOLOGY:  BLOOD  02-11-18 --  --  --      SPUTUM  02-10-18 --  --  Staph. aureus *MRSA*      URINE MIDSTREAM  02-09-18 --  --  --      BLOOD PERIPHERAL  02-09-18 --  --  --      BLOOD  02-09-18 --  --  BLOOD CULTURE PCR  Enterococcus faecalis      RADIOLOGY

## 2018-02-12 NOTE — PROGRESS NOTE ADULT - ATTENDING COMMENTS
Sarcoidosis, complicated pulmonary history s/p trach on mechanical ventilation.  Acute on chronic respiratory failure with VRE bacteremia and MRSA tracheobronchitis.   Continue Dapto for VRE bacteremia. Zosyn discontinued. Sputum culture growing MRSA - now on Bactrim.   Continue empiric PO vancomycin in setting of prior C. Diff.  Continues to intermittently have issues with vent - not getting adequate tidal volumes.   Patient with likely tracheal dilation from overdistended cuff. Plan for trach change.

## 2018-02-12 NOTE — PROGRESS NOTE ADULT - PROBLEM SELECTOR PLAN 2
- now improved  - tolerating vent settings  - Tidal voulume stable with cuff overinflated to 13cc  -chest PT  - trach care daily  - suction prn

## 2018-02-12 NOTE — PROGRESS NOTE ADULT - SUBJECTIVE AND OBJECTIVE BOX
CHIEF COMPLAINT:    Interval Events:    REVIEW OF SYSTEMS:  Constitutional:   Eyes:  ENT:  CV:  Resp:  GI:  :  MSK:  Integumentary:  Neurological:  Psychiatric:  Endocrine:  Hematologic/Lymphatic:  Allergic/Immunologic:  [ ] All other systems negative  [ ] Unable to assess ROS because ________    OBJECTIVE:  ICU Vital Signs Last 24 Hrs  T(C): 36.8 (12 Feb 2018 05:43), Max: 36.9 (11 Feb 2018 17:41)  T(F): 98.2 (12 Feb 2018 05:43), Max: 98.5 (11 Feb 2018 17:41)  HR: 91 (12 Feb 2018 09:01) (80 - 115)  BP: 131/80 (12 Feb 2018 05:43) (113/82 - 131/80)  BP(mean): --  ABP: --  ABP(mean): --  RR: 16 (12 Feb 2018 05:43) (16 - 26)  SpO2: 100% (12 Feb 2018 09:01) (100% - 113%)    Mode: AC/ CMV (Assist Control/ Continuous Mandatory Ventilation), RR (machine): 16, TV (machine): 500, FiO2: 40, PEEP: 5, PS: 10, MAP: 12.5, PIP: 32    02-11 @ 07:01  -  02-12 @ 07:00  --------------------------------------------------------  IN: 1020 mL / OUT: 820 mL / NET: 200 mL      CAPILLARY BLOOD GLUCOSE          PHYSICAL EXAM:  General:   HEENT:   Lymph Nodes:  Neck:   Respiratory:   Cardiovascular:   Abdomen:   Extremities:   Skin:   Neurological:  Psychiatry:    HOSPITAL MEDICATIONS:  MEDICATIONS  (STANDING):  ALBUTerol    90 MICROgram(s) HFA Inhaler 2 Puff(s) Inhalation every 6 hours  buDESOnide 160 MICROgram(s)/formoterol 4.5 MICROgram(s) Inhaler 2 Puff(s) Inhalation two times a day  chlorhexidine 4% Liquid 1 Application(s) Topical daily  DAPTOmycin IVPB      DAPTOmycin IVPB 640 milliGRAM(s) IV Intermittent every 24 hours  docusate sodium Liquid 10 milliGRAM(s) Oral daily  enoxaparin Injectable 40 milliGRAM(s) SubCutaneous daily  fentaNYL   Patch  12 MICROgram(s)/Hr 1 Patch Transdermal every 72 hours  folic acid 1 milliGRAM(s) Oral daily  ipratropium 17 MICROgram(s) HFA Inhaler 2 Puff(s) Inhalation every 6 hours  lactobacillus acidophilus 1 Tablet(s) Oral four times a day with meals  methylPREDNISolone sodium succinate Injectable 40 milliGRAM(s) IV Push daily  pantoprazole  Injectable 40 milliGRAM(s) IV Push daily  piperacillin/tazobactam IVPB. 3.375 Gram(s) IV Intermittent every 8 hours  senna Syrup 10 milliLiter(s) Oral at bedtime  vancomycin    Solution 125 milliGRAM(s) Oral two times a day    MEDICATIONS  (PRN):  acetaminophen    Suspension 650 milliGRAM(s) Oral every 6 hours PRN For Temp greater than 38 C (100.4 F)  acetaminophen    Suspension 650 milliGRAM(s) Oral every 6 hours PRN mild and moderate and severe pain  ALBUTerol    90 MICROgram(s) HFA Inhaler 2 Puff(s) Inhalation every 6 hours PRN Bronchospasm  calamine Lotion 1 Application(s) Topical every 4 hours PRN Rash and/or Itching  LORazepam     Tablet 1 milliGRAM(s) Oral three times a day PRN Anxiety  oxyCODONE    IR 10 milliGRAM(s) Oral every 8 hours PRN Moderate Pain (4 - 6)      LABS:                        7.2    9.54  )-----------( 174      ( 12 Feb 2018 05:05 )             22.7     02-12    146<H>  |  101  |  23  ----------------------------<  120<H>  3.9   |  36<H>  |  0.59    Ca    10.0      12 Feb 2018 05:05                MICROBIOLOGY:     RADIOLOGY:  [ ] Reviewed and interpreted by me    PULMONARY FUNCTION TESTS:    EKG: CHIEF COMPLAINT: Patient is a 47y old  Male who presents with a chief complaint of acute on chronic respiratory distress (09 Feb 2018 19:01)    Interval Events: Difficulty maintaining tidal volumes on the vent overnight.    REVIEW OF SYSTEMS:  Constitutional: Feeling better  Eyes: Denies difficulty  ENT: Denies difficulty  CV: Denies CP  Resp: c/o cough with secretions  GI: c/o being hungry  :  MSK: left sided weakness  Integumentary:  Neurological:  Psychiatric:  Endocrine:  Hematologic/Lymphatic:  Allergic/Immunologic:  [x] All other systems negative  [ ] Unable to assess ROS because ________    OBJECTIVE:  ICU Vital Signs Last 24 Hrs  T(C): 36.8 (12 Feb 2018 05:43), Max: 36.9 (11 Feb 2018 17:41)  T(F): 98.2 (12 Feb 2018 05:43), Max: 98.5 (11 Feb 2018 17:41)  HR: 91 (12 Feb 2018 09:01) (80 - 115)  BP: 131/80 (12 Feb 2018 05:43) (113/82 - 131/80)  BP(mean): --  ABP: --  ABP(mean): --  RR: 16 (12 Feb 2018 05:43) (16 - 26)  SpO2: 100% (12 Feb 2018 09:01) (100% - 113%)    Mode: AC/ CMV (Assist Control/ Continuous Mandatory Ventilation), RR (machine): 16, TV (machine): 500, FiO2: 40, PEEP: 5, PS: 10, MAP: 12.5, PIP: 32    02-11 @ 07:01  -  02-12 @ 07:00  --------------------------------------------------------  IN: 1020 mL / OUT: 820 mL / NET: 200 mL      CAPILLARY BLOOD GLUCOSE      HOSPITAL MEDICATIONS:  MEDICATIONS  (STANDING):  ALBUTerol    90 MICROgram(s) HFA Inhaler 2 Puff(s) Inhalation every 6 hours  buDESOnide 160 MICROgram(s)/formoterol 4.5 MICROgram(s) Inhaler 2 Puff(s) Inhalation two times a day  chlorhexidine 4% Liquid 1 Application(s) Topical daily  DAPTOmycin IVPB      DAPTOmycin IVPB 640 milliGRAM(s) IV Intermittent every 24 hours  docusate sodium Liquid 10 milliGRAM(s) Oral daily  enoxaparin Injectable 40 milliGRAM(s) SubCutaneous daily  fentaNYL   Patch  12 MICROgram(s)/Hr 1 Patch Transdermal every 72 hours  folic acid 1 milliGRAM(s) Oral daily  ipratropium 17 MICROgram(s) HFA Inhaler 2 Puff(s) Inhalation every 6 hours  lactobacillus acidophilus 1 Tablet(s) Oral four times a day with meals  methylPREDNISolone sodium succinate Injectable 40 milliGRAM(s) IV Push daily  pantoprazole  Injectable 40 milliGRAM(s) IV Push daily  piperacillin/tazobactam IVPB. 3.375 Gram(s) IV Intermittent every 8 hours  senna Syrup 10 milliLiter(s) Oral at bedtime  vancomycin    Solution 125 milliGRAM(s) Oral two times a day    MEDICATIONS  (PRN):  acetaminophen    Suspension 650 milliGRAM(s) Oral every 6 hours PRN For Temp greater than 38 C (100.4 F)  acetaminophen    Suspension 650 milliGRAM(s) Oral every 6 hours PRN mild and moderate and severe pain  ALBUTerol    90 MICROgram(s) HFA Inhaler 2 Puff(s) Inhalation every 6 hours PRN Bronchospasm  calamine Lotion 1 Application(s) Topical every 4 hours PRN Rash and/or Itching  LORazepam     Tablet 1 milliGRAM(s) Oral three times a day PRN Anxiety  oxyCODONE    IR 10 milliGRAM(s) Oral every 8 hours PRN Moderate Pain (4 - 6)      LABS:                        7.2    9.54  )-----------( 174      ( 12 Feb 2018 05:05 )             22.7     02-12    146<H>  |  101  |  23  ----------------------------<  120<H>  3.9   |  36<H>  |  0.59    Ca    10.0      12 Feb 2018 05:05                MICROBIOLOGY:     RADIOLOGY:  [ ] Reviewed and interpreted by me    PULMONARY FUNCTION TESTS:    EKG:

## 2018-02-12 NOTE — DIETITIAN INITIAL EVALUATION ADULT. - PROBLEM SELECTOR PLAN 7
Pain well controlled on current regimen at [facility]   - c/w Fentanyl patch 12mcg/72hr (has been gradually tapered down at [facility])  - c/w oxycodone 10mg PO q8h PRN for breakthrough pain   - bowel regimen

## 2018-02-12 NOTE — PROGRESS NOTE ADULT - PROBLEM SELECTOR PLAN 1
-cont daptomycin  -f/u bcx and repeat bcx  -f/u sensitivities  -check TTE  -planning for CT abd/pelvis  -UCx -ve  -DC zosyn

## 2018-02-12 NOTE — PROGRESS NOTE ADULT - PROBLEM SELECTOR PLAN 1
- Afebrile  - leukocytosis downtrending  - cont with  Dapto / oral Vanco while on antibiotics/discontinue Zosyn, Start bactrim for 5 days for tracheobronchitis/MRSA  - RVP negative  - legionella negative  - blood cultures with VRE. Follow up with TTE  - urine culture negative  - monitor fever and WBC curve

## 2018-02-13 LAB
BUN SERPL-MCNC: 20 MG/DL — SIGNIFICANT CHANGE UP (ref 7–23)
CALCIUM SERPL-MCNC: 9.5 MG/DL — SIGNIFICANT CHANGE UP (ref 8.4–10.5)
CHLORIDE SERPL-SCNC: 100 MMOL/L — SIGNIFICANT CHANGE UP (ref 98–107)
CO2 SERPL-SCNC: 38 MMOL/L — HIGH (ref 22–31)
CREAT SERPL-MCNC: 0.66 MG/DL — SIGNIFICANT CHANGE UP (ref 0.5–1.3)
GLUCOSE SERPL-MCNC: 91 MG/DL — SIGNIFICANT CHANGE UP (ref 70–99)
HCT VFR BLD CALC: 23.2 % — LOW (ref 39–50)
HGB BLD-MCNC: 7.3 G/DL — LOW (ref 13–17)
MCHC RBC-ENTMCNC: 28.9 PG — SIGNIFICANT CHANGE UP (ref 27–34)
MCHC RBC-ENTMCNC: 31.5 % — LOW (ref 32–36)
MCV RBC AUTO: 91.7 FL — SIGNIFICANT CHANGE UP (ref 80–100)
NRBC # FLD: 0 — SIGNIFICANT CHANGE UP
PLATELET # BLD AUTO: 181 K/UL — SIGNIFICANT CHANGE UP (ref 150–400)
PMV BLD: 12.5 FL — SIGNIFICANT CHANGE UP (ref 7–13)
POTASSIUM SERPL-MCNC: 3.6 MMOL/L — SIGNIFICANT CHANGE UP (ref 3.5–5.3)
POTASSIUM SERPL-SCNC: 3.6 MMOL/L — SIGNIFICANT CHANGE UP (ref 3.5–5.3)
RBC # BLD: 2.53 M/UL — LOW (ref 4.2–5.8)
RBC # FLD: 13.6 % — SIGNIFICANT CHANGE UP (ref 10.3–14.5)
SODIUM SERPL-SCNC: 147 MMOL/L — HIGH (ref 135–145)
WBC # BLD: 7.12 K/UL — SIGNIFICANT CHANGE UP (ref 3.8–10.5)
WBC # FLD AUTO: 7.12 K/UL — SIGNIFICANT CHANGE UP (ref 3.8–10.5)

## 2018-02-13 PROCEDURE — 99233 SBSQ HOSP IP/OBS HIGH 50: CPT

## 2018-02-13 RX ADMIN — PANTOPRAZOLE SODIUM 40 MILLIGRAM(S): 20 TABLET, DELAYED RELEASE ORAL at 12:52

## 2018-02-13 RX ADMIN — Medication 650 MILLIGRAM(S): at 18:19

## 2018-02-13 RX ADMIN — SENNA PLUS 10 MILLILITER(S): 8.6 TABLET ORAL at 21:55

## 2018-02-13 RX ADMIN — Medication 40 MILLIGRAM(S): at 06:28

## 2018-02-13 RX ADMIN — Medication 10 MILLIGRAM(S): at 12:49

## 2018-02-13 RX ADMIN — DAPTOMYCIN 125.6 MILLIGRAM(S): 500 INJECTION, POWDER, LYOPHILIZED, FOR SOLUTION INTRAVENOUS at 13:03

## 2018-02-13 RX ADMIN — BUDESONIDE AND FORMOTEROL FUMARATE DIHYDRATE 2 PUFF(S): 160; 4.5 AEROSOL RESPIRATORY (INHALATION) at 09:02

## 2018-02-13 RX ADMIN — Medication 1 MILLIGRAM(S): at 22:51

## 2018-02-13 RX ADMIN — Medication 1 MILLIGRAM(S): at 12:49

## 2018-02-13 RX ADMIN — Medication 2 PUFF(S): at 04:21

## 2018-02-13 RX ADMIN — Medication 125 MILLIGRAM(S): at 06:28

## 2018-02-13 RX ADMIN — Medication 2 PUFF(S): at 15:37

## 2018-02-13 RX ADMIN — Medication 160 MILLIGRAM(S): at 18:49

## 2018-02-13 RX ADMIN — Medication 1 TABLET(S): at 09:25

## 2018-02-13 RX ADMIN — BUDESONIDE AND FORMOTEROL FUMARATE DIHYDRATE 2 PUFF(S): 160; 4.5 AEROSOL RESPIRATORY (INHALATION) at 19:39

## 2018-02-13 RX ADMIN — CHLORHEXIDINE GLUCONATE 1 APPLICATION(S): 213 SOLUTION TOPICAL at 11:31

## 2018-02-13 RX ADMIN — Medication 2 PUFF(S): at 21:32

## 2018-02-13 RX ADMIN — Medication 1 TABLET(S): at 18:20

## 2018-02-13 RX ADMIN — OXYCODONE HYDROCHLORIDE 10 MILLIGRAM(S): 5 TABLET ORAL at 02:00

## 2018-02-13 RX ADMIN — Medication 1 TABLET(S): at 21:55

## 2018-02-13 RX ADMIN — Medication 125 MILLIGRAM(S): at 18:20

## 2018-02-13 RX ADMIN — Medication 1 TABLET(S): at 12:49

## 2018-02-13 RX ADMIN — ENOXAPARIN SODIUM 40 MILLIGRAM(S): 100 INJECTION SUBCUTANEOUS at 12:48

## 2018-02-13 RX ADMIN — Medication 160 MILLIGRAM(S): at 06:28

## 2018-02-13 RX ADMIN — Medication 2 PUFF(S): at 09:02

## 2018-02-13 RX ADMIN — OXYCODONE HYDROCHLORIDE 10 MILLIGRAM(S): 5 TABLET ORAL at 01:16

## 2018-02-13 NOTE — PHYSICAL THERAPY INITIAL EVALUATION ADULT - PLANNED THERAPY INTERVENTIONS, PT EVAL
balance training/neuromuscular re-education/bed mobility training/transfer training/strengthening/gait training

## 2018-02-13 NOTE — PHYSICAL THERAPY INITIAL EVALUATION ADULT - PERTINENT HX OF CURRENT PROBLEM, REHAB EVAL
47M with complex PMHx significant for HTN, PFO,sarcoidosis c/b aspergillosis asthma/COPD, s/p CVA w/ L hemiplegia ((9/2017), pAfib (not on A/C 2/2 recurrent hemoptysis), s/p emergency L upper lobectomy for hemoptysis requiring IR embolization of L bronchial artery (11/2017) , s/p tracheostomy (11/24/17) and PEG placement (12/6/18) now returns from [facility] w/ acute on chronic respiratory failure, tachycardia and fevers x 1 week. 47M with complex PMHx significant for HTN, PFO,sarcoidosis c/b aspergillosis asthma/COPD, s/p CVA w/ Left hemiplegia ((9/2017), pAfib (not on A/C 2/2 recurrent hemoptysis), s/p emergency Left upper lobectomy for hemoptysis requiring IR embolization of Left bronchial artery (11/2017) , s/p tracheostomy (11/24/17) and PEG placement (12/6/18) now returns from [facility] w/ acute on chronic respiratory failure, tachycardia and fevers x 1 week.

## 2018-02-13 NOTE — PROGRESS NOTE ADULT - PROBLEM SELECTOR PLAN 2
- now improved  - tolerating vent settings and periods of CPAP  - Tidal voulume stable with cuff overinflated to 13cc  -chest PT  - trach care daily  - suction prn

## 2018-02-13 NOTE — PHYSICAL THERAPY INITIAL EVALUATION ADULT - DIAGNOSIS, PT EVAL
Pt admitted for HCAP; pt presents with decreased strength, decreased balance, and decreased aerobic capacity/endurance

## 2018-02-13 NOTE — PROGRESS NOTE ADULT - SUBJECTIVE AND OBJECTIVE BOX
CHIEF COMPLAINT:    Interval Events:    REVIEW OF SYSTEMS:  Constitutional:   Eyes:  ENT:  CV:  Resp:  GI:  :  MSK:  Integumentary:  Neurological:  Psychiatric:  Endocrine:  Hematologic/Lymphatic:  Allergic/Immunologic:  [ ] All other systems negative  [ ] Unable to assess ROS because ________    OBJECTIVE:  ICU Vital Signs Last 24 Hrs  T(C): 36.7 (13 Feb 2018 06:25), Max: 36.8 (12 Feb 2018 13:26)  T(F): 98 (13 Feb 2018 06:25), Max: 98.3 (12 Feb 2018 13:26)  HR: 93 (13 Feb 2018 06:28) (71 - 116)  BP: 116/89 (13 Feb 2018 06:25) (116/89 - 136/91)  BP(mean): --  ABP: --  ABP(mean): --  RR: 18 (13 Feb 2018 06:25) (16 - 18)  SpO2: 100% (13 Feb 2018 06:28) (100% - 100%)    Mode: AC/ CMV (Assist Control/ Continuous Mandatory Ventilation), RR (machine): 16, TV (machine): 500, FiO2: 40, PEEP: 5, MAP: 13, PIP: 36    02-12 @ 07:01  -  02-13 @ 07:00  --------------------------------------------------------  IN: 950 mL / OUT: 1400 mL / NET: -450 mL      CAPILLARY BLOOD GLUCOSE          PHYSICAL EXAM:  General:   HEENT:   Lymph Nodes:  Neck:   Respiratory:   Cardiovascular:   Abdomen:   Extremities:   Skin:   Neurological:  Psychiatry:    HOSPITAL MEDICATIONS:  MEDICATIONS  (STANDING):  ALBUTerol    90 MICROgram(s) HFA Inhaler 2 Puff(s) Inhalation every 6 hours  buDESOnide 160 MICROgram(s)/formoterol 4.5 MICROgram(s) Inhaler 2 Puff(s) Inhalation two times a day  chlorhexidine 4% Liquid 1 Application(s) Topical daily  DAPTOmycin IVPB      DAPTOmycin IVPB 640 milliGRAM(s) IV Intermittent every 24 hours  docusate sodium Liquid 10 milliGRAM(s) Oral daily  enoxaparin Injectable 40 milliGRAM(s) SubCutaneous daily  fentaNYL   Patch  12 MICROgram(s)/Hr 1 Patch Transdermal every 72 hours  folic acid 1 milliGRAM(s) Oral daily  ipratropium 17 MICROgram(s) HFA Inhaler 2 Puff(s) Inhalation every 6 hours  lactobacillus acidophilus 1 Tablet(s) Oral four times a day with meals  methylPREDNISolone sodium succinate Injectable 40 milliGRAM(s) IV Push daily  pantoprazole  Injectable 40 milliGRAM(s) IV Push daily  senna Syrup 10 milliLiter(s) Oral at bedtime  trimethoprim  40 mG/sulfamethoxazole 200 mG Suspension 160 milliGRAM(s) Oral every 12 hours  vancomycin    Solution 125 milliGRAM(s) Oral two times a day    MEDICATIONS  (PRN):  acetaminophen    Suspension 650 milliGRAM(s) Oral every 6 hours PRN For Temp greater than 38 C (100.4 F)  acetaminophen    Suspension 650 milliGRAM(s) Oral every 6 hours PRN mild and moderate and severe pain  ALBUTerol    90 MICROgram(s) HFA Inhaler 2 Puff(s) Inhalation every 6 hours PRN Bronchospasm  calamine Lotion 1 Application(s) Topical every 4 hours PRN Rash and/or Itching  LORazepam     Tablet 1 milliGRAM(s) Oral three times a day PRN Anxiety  oxyCODONE    IR 10 milliGRAM(s) Oral every 8 hours PRN Moderate Pain (4 - 6)      LABS:                        7.3    7.12  )-----------( 181      ( 13 Feb 2018 06:20 )             23.2     02-12    146<H>  |  101  |  23  ----------------------------<  120<H>  3.9   |  36<H>  |  0.59    Ca    10.0      12 Feb 2018 05:05                MICROBIOLOGY:     RADIOLOGY:  [ ] Reviewed and interpreted by me    PULMONARY FUNCTION TESTS:    EKG: CHIEF COMPLAINT: Patient is a 47y old  Male who presents with a chief complaint of acute on chronic respiratory distress (09 Feb 2018 19:01)    Interval Events: No acute events overnight    REVIEW OF SYSTEMS:  Constitutional: Feels ok  Eyes: No difficulty  ENT: Denies difficulty  CV: denies chest pain  Resp: Denies respiratory distress at this time.  GI: I'm hungry  :  MSK: Getting stronger  Integumentary:  Neurological:  Psychiatric: Frustrated  Endocrine:  Hematologic/Lymphatic:  Allergic/Immunologic:  [x] All other systems negative  [ ] Unable to assess ROS because ________    OBJECTIVE:  ICU Vital Signs Last 24 Hrs  T(C): 36.7 (13 Feb 2018 06:25), Max: 36.8 (12 Feb 2018 13:26)  T(F): 98 (13 Feb 2018 06:25), Max: 98.3 (12 Feb 2018 13:26)  HR: 93 (13 Feb 2018 06:28) (71 - 116)  BP: 116/89 (13 Feb 2018 06:25) (116/89 - 136/91)  BP(mean): --  ABP: --  ABP(mean): --  RR: 18 (13 Feb 2018 06:25) (16 - 18)  SpO2: 100% (13 Feb 2018 06:28) (100% - 100%)    Mode: AC/ CMV (Assist Control/ Continuous Mandatory Ventilation), RR (machine): 16, TV (machine): 500, FiO2: 40, PEEP: 5, MAP: 13, PIP: 36    02-12 @ 07:01  -  02-13 @ 07:00  --------------------------------------------------------  IN: 950 mL / OUT: 1400 mL / NET: -450 mL      CAPILLARY BLOOD GLUCOSE      HOSPITAL MEDICATIONS:  MEDICATIONS  (STANDING):  ALBUTerol    90 MICROgram(s) HFA Inhaler 2 Puff(s) Inhalation every 6 hours  buDESOnide 160 MICROgram(s)/formoterol 4.5 MICROgram(s) Inhaler 2 Puff(s) Inhalation two times a day  chlorhexidine 4% Liquid 1 Application(s) Topical daily  DAPTOmycin IVPB      DAPTOmycin IVPB 640 milliGRAM(s) IV Intermittent every 24 hours  docusate sodium Liquid 10 milliGRAM(s) Oral daily  enoxaparin Injectable 40 milliGRAM(s) SubCutaneous daily  fentaNYL   Patch  12 MICROgram(s)/Hr 1 Patch Transdermal every 72 hours  folic acid 1 milliGRAM(s) Oral daily  ipratropium 17 MICROgram(s) HFA Inhaler 2 Puff(s) Inhalation every 6 hours  lactobacillus acidophilus 1 Tablet(s) Oral four times a day with meals  methylPREDNISolone sodium succinate Injectable 40 milliGRAM(s) IV Push daily  pantoprazole  Injectable 40 milliGRAM(s) IV Push daily  senna Syrup 10 milliLiter(s) Oral at bedtime  trimethoprim  40 mG/sulfamethoxazole 200 mG Suspension 160 milliGRAM(s) Oral every 12 hours  vancomycin    Solution 125 milliGRAM(s) Oral two times a day    MEDICATIONS  (PRN):  acetaminophen    Suspension 650 milliGRAM(s) Oral every 6 hours PRN For Temp greater than 38 C (100.4 F)  acetaminophen    Suspension 650 milliGRAM(s) Oral every 6 hours PRN mild and moderate and severe pain  ALBUTerol    90 MICROgram(s) HFA Inhaler 2 Puff(s) Inhalation every 6 hours PRN Bronchospasm  calamine Lotion 1 Application(s) Topical every 4 hours PRN Rash and/or Itching  LORazepam     Tablet 1 milliGRAM(s) Oral three times a day PRN Anxiety  oxyCODONE    IR 10 milliGRAM(s) Oral every 8 hours PRN Moderate Pain (4 - 6)      LABS:                        7.3    7.12  )-----------( 181      ( 13 Feb 2018 06:20 )             23.2     02-12    146<H>  |  101  |  23  ----------------------------<  120<H>  3.9   |  36<H>  |  0.59    Ca    10.0      12 Feb 2018 05:05                MICROBIOLOGY:     RADIOLOGY:  [ ] Reviewed and interpreted by me    PULMONARY FUNCTION TESTS:    EKG:

## 2018-02-13 NOTE — PHYSICAL THERAPY INITIAL EVALUATION ADULT - IMPAIRMENTS FOUND, PT EVAL
ventilation and respiration/gas exchange/aerobic capacity/endurance/muscle strength/gait, locomotion, and balance

## 2018-02-13 NOTE — PROGRESS NOTE ADULT - ATTENDING COMMENTS
Sarcoidosis, complicated pulmonary history s/p trach on mechanical ventilation.  Acute on chronic respiratory failure with VRE bacteremia and MRSA tracheobronchitis.   Continue Dapto for VRE bacteremia. Continue Bactrim for MRSA tracheitis.  Continue empiric PO vancomycin in setting of prior C. Diff.  Need trach change - will plan for change in MICU as patient quickly desaturates off vent support.  CPAP and trach collar trial as tolerated  PT eval.

## 2018-02-13 NOTE — PHYSICAL THERAPY INITIAL EVALUATION ADULT - PASSIVE RANGE OF MOTION EXAMINATION, REHAB EVAL
Right UE Passive ROM was WFL (within functional limits)/bilateral lower extremity Passive ROM was WFL (within functional limits)/Left shoulder flexion ~70 degrees, Left elbow/hand/wrist WFL

## 2018-02-13 NOTE — PHYSICAL THERAPY INITIAL EVALUATION ADULT - PATIENT PROFILE REVIEW, REHAB EVAL
yes/ACTIVITY: Ambulate as Tolerated/ Increase as Tolerated/ OOB to Chair; spoke with CHARLIE Guy prior to PT evaluation--> Pt OK for PT consult

## 2018-02-13 NOTE — PHYSICAL THERAPY INITIAL EVALUATION ADULT - ADDITIONAL COMMENTS
Pt reports he lives in a private home with family and girlfriend. Pt. has ~4-5 MICHELLE; bedroom/bathroom on first floor . Pt. was previously ambulating without the use of any AD independently. Pt. previously independent with all ADLs. Pt however has been at rehab where he has been using a Rolling walker with assist with ambulation of ~50ft   Pt. returned to bed at end of session with lines/tubes intact, call bell in reach and in NAD. CHARLIE Heart made aware.

## 2018-02-13 NOTE — PROGRESS NOTE ADULT - PROBLEM SELECTOR PLAN 1
-VRE bacteremia.  - Afebrile  - leukocytosis downtrending  - cont with  Dapto / oral Vanco while on antibiotics/discontinue Zosyn, Start bactrim for 5 days for tracheobronchitis/MRSA  - RVP negative  - legionella negative  - blood cultures with VRE. Follow up with TTE  - urine culture negative  - monitor fever and WBC curve

## 2018-02-13 NOTE — PHYSICAL THERAPY INITIAL EVALUATION ADULT - GENERAL OBSERVATIONS, REHAB EVAL
Pt encountered in semisupine position, no distress, AxOx4, with +IV, +tele, +mock, PEG, trach to vent, and girlfriend @bedside.

## 2018-02-14 LAB
BACTERIA BLD CULT: SIGNIFICANT CHANGE UP
BUN SERPL-MCNC: 20 MG/DL — SIGNIFICANT CHANGE UP (ref 7–23)
CALCIUM SERPL-MCNC: 9.7 MG/DL — SIGNIFICANT CHANGE UP (ref 8.4–10.5)
CHLORIDE SERPL-SCNC: 96 MMOL/L — LOW (ref 98–107)
CO2 SERPL-SCNC: 37 MMOL/L — HIGH (ref 22–31)
CREAT SERPL-MCNC: 0.79 MG/DL — SIGNIFICANT CHANGE UP (ref 0.5–1.3)
GLUCOSE SERPL-MCNC: 117 MG/DL — HIGH (ref 70–99)
POTASSIUM SERPL-MCNC: 3.3 MMOL/L — LOW (ref 3.5–5.3)
POTASSIUM SERPL-SCNC: 3.3 MMOL/L — LOW (ref 3.5–5.3)
SODIUM SERPL-SCNC: 143 MMOL/L — SIGNIFICANT CHANGE UP (ref 135–145)

## 2018-02-14 PROCEDURE — 99233 SBSQ HOSP IP/OBS HIGH 50: CPT

## 2018-02-14 PROCEDURE — 99232 SBSQ HOSP IP/OBS MODERATE 35: CPT

## 2018-02-14 RX ORDER — FENTANYL CITRATE 50 UG/ML
200 INJECTION INTRAVENOUS ONCE
Qty: 0 | Refills: 0 | Status: DISCONTINUED | OUTPATIENT
Start: 2018-02-14 | End: 2018-02-14

## 2018-02-14 RX ORDER — MIDAZOLAM HYDROCHLORIDE 1 MG/ML
8 INJECTION, SOLUTION INTRAMUSCULAR; INTRAVENOUS ONCE
Qty: 0 | Refills: 0 | Status: DISCONTINUED | OUTPATIENT
Start: 2018-02-14 | End: 2018-02-14

## 2018-02-14 RX ORDER — NOREPINEPHRINE BITARTRATE/D5W 8 MG/250ML
0.01 PLASTIC BAG, INJECTION (ML) INTRAVENOUS
Qty: 8 | Refills: 0 | Status: DISCONTINUED | OUTPATIENT
Start: 2018-02-14 | End: 2018-02-14

## 2018-02-14 RX ORDER — POTASSIUM CHLORIDE 20 MEQ
40 PACKET (EA) ORAL EVERY 4 HOURS
Qty: 0 | Refills: 0 | Status: COMPLETED | OUTPATIENT
Start: 2018-02-14 | End: 2018-02-14

## 2018-02-14 RX ORDER — CISATRACURIUM BESYLATE 2 MG/ML
20 INJECTION INTRAVENOUS ONCE
Qty: 0 | Refills: 0 | Status: COMPLETED | OUTPATIENT
Start: 2018-02-14 | End: 2018-02-14

## 2018-02-14 RX ORDER — PROPOFOL 10 MG/ML
5 INJECTION, EMULSION INTRAVENOUS
Qty: 1000 | Refills: 0 | Status: DISCONTINUED | OUTPATIENT
Start: 2018-02-14 | End: 2018-02-14

## 2018-02-14 RX ORDER — PROPOFOL 10 MG/ML
80 INJECTION, EMULSION INTRAVENOUS ONCE
Qty: 0 | Refills: 0 | Status: COMPLETED | OUTPATIENT
Start: 2018-02-14 | End: 2018-02-14

## 2018-02-14 RX ADMIN — BUDESONIDE AND FORMOTEROL FUMARATE DIHYDRATE 2 PUFF(S): 160; 4.5 AEROSOL RESPIRATORY (INHALATION) at 22:00

## 2018-02-14 RX ADMIN — Medication 125 MILLIGRAM(S): at 05:35

## 2018-02-14 RX ADMIN — CISATRACURIUM BESYLATE 20 MILLIGRAM(S): 2 INJECTION INTRAVENOUS at 20:02

## 2018-02-14 RX ADMIN — BUDESONIDE AND FORMOTEROL FUMARATE DIHYDRATE 2 PUFF(S): 160; 4.5 AEROSOL RESPIRATORY (INHALATION) at 10:20

## 2018-02-14 RX ADMIN — PANTOPRAZOLE SODIUM 40 MILLIGRAM(S): 20 TABLET, DELAYED RELEASE ORAL at 12:18

## 2018-02-14 RX ADMIN — Medication 40 MILLIGRAM(S): at 05:36

## 2018-02-14 RX ADMIN — Medication 2 PUFF(S): at 15:45

## 2018-02-14 RX ADMIN — Medication 1 MILLIGRAM(S): at 12:18

## 2018-02-14 RX ADMIN — FENTANYL CITRATE 200 MICROGRAM(S): 50 INJECTION INTRAVENOUS at 17:00

## 2018-02-14 RX ADMIN — PROPOFOL 80 MILLIGRAM(S): 10 INJECTION, EMULSION INTRAVENOUS at 17:00

## 2018-02-14 RX ADMIN — DAPTOMYCIN 125.6 MILLIGRAM(S): 500 INJECTION, POWDER, LYOPHILIZED, FOR SOLUTION INTRAVENOUS at 13:13

## 2018-02-14 RX ADMIN — OXYCODONE HYDROCHLORIDE 10 MILLIGRAM(S): 5 TABLET ORAL at 20:57

## 2018-02-14 RX ADMIN — Medication 160 MILLIGRAM(S): at 18:53

## 2018-02-14 RX ADMIN — Medication 1 TABLET(S): at 18:52

## 2018-02-14 RX ADMIN — Medication 2 PUFF(S): at 10:19

## 2018-02-14 RX ADMIN — Medication 2 PUFF(S): at 03:08

## 2018-02-14 RX ADMIN — CHLORHEXIDINE GLUCONATE 1 APPLICATION(S): 213 SOLUTION TOPICAL at 11:03

## 2018-02-14 RX ADMIN — Medication 40 MILLIEQUIVALENT(S): at 12:18

## 2018-02-14 RX ADMIN — Medication 2 PUFF(S): at 21:59

## 2018-02-14 RX ADMIN — Medication 125 MILLIGRAM(S): at 18:53

## 2018-02-14 RX ADMIN — ENOXAPARIN SODIUM 40 MILLIGRAM(S): 100 INJECTION SUBCUTANEOUS at 12:18

## 2018-02-14 RX ADMIN — Medication 160 MILLIGRAM(S): at 05:35

## 2018-02-14 RX ADMIN — Medication 1 TABLET(S): at 22:16

## 2018-02-14 RX ADMIN — Medication 1 MILLIGRAM(S): at 22:16

## 2018-02-14 RX ADMIN — Medication 40 MILLIEQUIVALENT(S): at 09:48

## 2018-02-14 RX ADMIN — MIDAZOLAM HYDROCHLORIDE 8 MILLIGRAM(S): 1 INJECTION, SOLUTION INTRAMUSCULAR; INTRAVENOUS at 17:00

## 2018-02-14 RX ADMIN — Medication 1 TABLET(S): at 12:18

## 2018-02-14 RX ADMIN — Medication 1 TABLET(S): at 09:48

## 2018-02-14 NOTE — CHART NOTE - NSCHARTNOTEFT_GEN_A_CORE
MICU Transfer Note    Transfer from: MICU  Transfer to:  (  ) Medicine    (  ) Telemetry    (X) RCU    (  ) Palliative    (  ) Stroke Unit    (  ) _______________  Accepting physican:      MICU COURSE:  Pt transferred to MICU for tracheitis for trach exchange and JONATAN given VRE bacteremia.    ASSESSMENT & PLAN:   Assessment:   47M with complex PMHx significant for HTN, PFO,sarcoidosis c/b aspergillosis asthma/COPD, s/p CVA w/ L hemiplegia ((9/2017), pAfib (not on A/C 2/2 recurrent hemoptysis), s/p emergency L upper lobectomy for hemoptysis requiring IR embolization of L bronchial artery (11/2017) , s/p tracheostomy (11/24/17) and PEG placement (12/6/18) now returns from [facility] w/ acute on chronic respiratory failure, tachycardia and fevers x 1 week. Admitted with sepsis, VRE bacteremia and tracheitis. Pt remains HD stable requiring RCU admission for aggressive respiratory care.  Now being transferred to MICU for JONATAN and trach exchange.     #neuro:  - no issues, pt mentating at baseline   - on chronic opiods for pain    #CV  - sinus tachycardia; likely multifactorial 2/2 bacteremia / C.diff vs respiratory distress vs anxiety/adjustment disorder and   - c/t to monitor for now   - remains otherwise HD stable off pressor support   - hold off A/C (given bleeding history ) for pAfib     #Pulm - admitted with acute on chronic respiratory distress , now improved ; possibly 2/2 tracheitis   - plan for trach exchange in MICU today   - tolerating vent settings and periods of CPAP  - c/w aggressive chest PT and suctioning   - c/w Daptomycin / Bactrim DS   - c/w Duoneb and Symbicort inhaler   - c/w solumedrol 40mg IV qd     #ID - VRE bacteremia (BCx from 2/9; BCx negative from 2/11; RVP negative, legionella negative,UCx negative ; CT A/P negative for any source   - appreciate ID (Dr. Cope) recs; c/w  Dapto /oral Vanco while on antibiotics/ bactrim for 5 days for tracheobronchitis/MRSA  - plan for JONATAN today  - monitor fever and WBC curve.     #Endo/Rheum  - c/w sarcoidosis home medication regimen     #GI  - NPO for now for JONATAN / trach exchange   - PEG in place; can resume feeds following procedures   - PPI IV ad     #Renal/Metabolic  - renal function normal   - hypokalemia - will replete     #Heme   - H/H stable   - leukocytosis now resolved     #Psych  - c/w Ativan PRN for anxiety/adjustment disorder (home med)    #DVT ppx:   - LSQ     DISPO: Likely back to RCU following procedures           For Follow-Up:          Vital Signs Last 24 Hrs  T(C): 36.9 (14 Feb 2018 16:18), Max: 37.1 (13 Feb 2018 18:01)  T(F): 98.5 (14 Feb 2018 16:18), Max: 98.8 (14 Feb 2018 09:51)  HR: 89 (14 Feb 2018 17:30) (68 - 107)  BP: 141/98 (14 Feb 2018 17:30) (98/74 - 141/98)  BP(mean): 110 (14 Feb 2018 17:30) (81 - 111)  RR: 16 (14 Feb 2018 17:30) (1 - 26)  SpO2: 100% (14 Feb 2018 17:30) (95% - 100%)  I&O's Summary    13 Feb 2018 07:01  -  14 Feb 2018 07:00  --------------------------------------------------------  IN: 0 mL / OUT: 200 mL / NET: -200 mL          MEDICATIONS  (STANDING):  ALBUTerol    90 MICROgram(s) HFA Inhaler 2 Puff(s) Inhalation every 6 hours  buDESOnide 160 MICROgram(s)/formoterol 4.5 MICROgram(s) Inhaler 2 Puff(s) Inhalation two times a day  chlorhexidine 4% Liquid 1 Application(s) Topical daily  DAPTOmycin IVPB      DAPTOmycin IVPB 640 milliGRAM(s) IV Intermittent every 24 hours  docusate sodium Liquid 10 milliGRAM(s) Oral daily  enoxaparin Injectable 40 milliGRAM(s) SubCutaneous daily  fentaNYL   Patch  12 MICROgram(s)/Hr 1 Patch Transdermal every 72 hours  folic acid 1 milliGRAM(s) Oral daily  ipratropium 17 MICROgram(s) HFA Inhaler 2 Puff(s) Inhalation every 6 hours  lactobacillus acidophilus 1 Tablet(s) Oral four times a day with meals  methylPREDNISolone sodium succinate Injectable 40 milliGRAM(s) IV Push daily  pantoprazole  Injectable 40 milliGRAM(s) IV Push daily  senna Syrup 10 milliLiter(s) Oral at bedtime  trimethoprim  40 mG/sulfamethoxazole 200 mG Suspension 160 milliGRAM(s) Oral every 12 hours  vancomycin    Solution 125 milliGRAM(s) Oral two times a day    MEDICATIONS  (PRN):  acetaminophen    Suspension 650 milliGRAM(s) Oral every 6 hours PRN For Temp greater than 38 C (100.4 F)  acetaminophen    Suspension 650 milliGRAM(s) Oral every 6 hours PRN mild and moderate and severe pain  ALBUTerol    90 MICROgram(s) HFA Inhaler 2 Puff(s) Inhalation every 6 hours PRN Bronchospasm  calamine Lotion 1 Application(s) Topical every 4 hours PRN Rash and/or Itching  LORazepam     Tablet 1 milliGRAM(s) Oral three times a day PRN Anxiety  oxyCODONE    IR 10 milliGRAM(s) Oral every 8 hours PRN Moderate Pain (4 - 6)        LABS                              143    |  96     |  20                  Calcium: 9.7   / iCa: x      (02-14 @ 06:10)    ----------------------------<  117       Magnesium: x                                3.3     |  37     |  0.79             Phosphorous: x MICU Transfer Note    Transfer from: MICU  Transfer to:  (  ) Medicine    (  ) Telemetry    (X) RCU    (  ) Palliative    (  ) Stroke Unit    (  ) _______________  Accepting physican:      MICU COURSE:  Pt transferred to MICU for tracheitis for trach exchange and JONATAN given VRE bacteremia. Tolerated procedure well, with exchange to 8 XLG.    ASSESSMENT & PLAN:   Assessment:   47M with complex PMHx significant for HTN, PFO,sarcoidosis c/b aspergillosis asthma/COPD, s/p CVA w/ L hemiplegia ((9/2017), pAfib (not on A/C 2/2 recurrent hemoptysis), s/p emergency L upper lobectomy for hemoptysis requiring IR embolization of L bronchial artery (11/2017) , s/p tracheostomy (11/24/17) and PEG placement (12/6/18) now returns from [facility] w/ acute on chronic respiratory failure, tachycardia and fevers x 1 week. Admitted with sepsis, VRE bacteremia and tracheitis. Pt remains HD stable requiring RCU admission for aggressive respiratory care.  Now being transferred to MICU for JONATAN and trach exchange.     #neuro:  - no issues, pt mentating at baseline   - on chronic opioids for pain    #CV  - sinus tachycardia; likely multifactorial 2/2 bacteremia / C.diff vs respiratory distress vs anxiety/adjustment disorder and   - c/t to monitor for now   - remains otherwise HD stable off pressor support   - hold off A/C (given bleeding history ) for pAfib     #Pulm - admitted with acute on chronic respiratory distress , now improved ; possibly 2/2 tracheitis   - plan for trach exchange in MICU today   - tolerating vent settings and periods of CPAP  - c/w aggressive chest PT and suctioning   - c/w Daptomycin / Bactrim DS   - c/w Duoneb and Symbicort inhaler   - c/w solumedrol 40mg IV qd     #ID - VRE bacteremia (BCx from 2/9; BCx negative from 2/11; RVP negative, legionella negative,UCx negative ; CT A/P negative for any source   - appreciate ID (Dr. Cope) recs; c/w  Dapto /oral Vanco while on antibiotics/ bactrim for 5 days for tracheobronchitis/MRSA  - plan for JONATAN today  - monitor fever and WBC curve.     #Endo/Rheum  - c/w sarcoidosis home medication regimen     #GI  - NPO for now for JONATAN / trach exchange   - PEG in place; can resume feeds following procedures   - PPI IV ad     #Renal/Metabolic  - renal function normal   - hypokalemia - will replete     #Heme   - H/H stable   - leukocytosis now resolved     #Psych  - c/w Ativan PRN for anxiety/adjustment disorder (home med)    #DVT ppx:   - LSQ     DISPO: Back to RCU following procedures, plan as per RCU    Vital Signs Last 24 Hrs  T(C): 36.9 (14 Feb 2018 16:18), Max: 37.1 (13 Feb 2018 18:01)  T(F): 98.5 (14 Feb 2018 16:18), Max: 98.8 (14 Feb 2018 09:51)  HR: 89 (14 Feb 2018 17:30) (68 - 107)  BP: 141/98 (14 Feb 2018 17:30) (98/74 - 141/98)  BP(mean): 110 (14 Feb 2018 17:30) (81 - 111)  RR: 16 (14 Feb 2018 17:30) (1 - 26)  SpO2: 100% (14 Feb 2018 17:30) (95% - 100%)  I&O's Summary    13 Feb 2018 07:01  -  14 Feb 2018 07:00  --------------------------------------------------------  IN: 0 mL / OUT: 200 mL / NET: -200 mL    MEDICATIONS  (STANDING):  ALBUTerol    90 MICROgram(s) HFA Inhaler 2 Puff(s) Inhalation every 6 hours  buDESOnide 160 MICROgram(s)/formoterol 4.5 MICROgram(s) Inhaler 2 Puff(s) Inhalation two times a day  chlorhexidine 4% Liquid 1 Application(s) Topical daily  DAPTOmycin IVPB      DAPTOmycin IVPB 640 milliGRAM(s) IV Intermittent every 24 hours  docusate sodium Liquid 10 milliGRAM(s) Oral daily  enoxaparin Injectable 40 milliGRAM(s) SubCutaneous daily  fentaNYL   Patch  12 MICROgram(s)/Hr 1 Patch Transdermal every 72 hours  folic acid 1 milliGRAM(s) Oral daily  ipratropium 17 MICROgram(s) HFA Inhaler 2 Puff(s) Inhalation every 6 hours  lactobacillus acidophilus 1 Tablet(s) Oral four times a day with meals  methylPREDNISolone sodium succinate Injectable 40 milliGRAM(s) IV Push daily  pantoprazole  Injectable 40 milliGRAM(s) IV Push daily  senna Syrup 10 milliLiter(s) Oral at bedtime  trimethoprim  40 mG/sulfamethoxazole 200 mG Suspension 160 milliGRAM(s) Oral every 12 hours  vancomycin    Solution 125 milliGRAM(s) Oral two times a day    MEDICATIONS  (PRN):  acetaminophen    Suspension 650 milliGRAM(s) Oral every 6 hours PRN For Temp greater than 38 C (100.4 F)  acetaminophen    Suspension 650 milliGRAM(s) Oral every 6 hours PRN mild and moderate and severe pain  ALBUTerol    90 MICROgram(s) HFA Inhaler 2 Puff(s) Inhalation every 6 hours PRN Bronchospasm  calamine Lotion 1 Application(s) Topical every 4 hours PRN Rash and/or Itching  LORazepam     Tablet 1 milliGRAM(s) Oral three times a day PRN Anxiety  oxyCODONE    IR 10 milliGRAM(s) Oral every 8 hours PRN Moderate Pain (4 - 6)        LABS                              143    |  96     |  20                  Calcium: 9.7   / iCa: x      (02-14 @ 06:10)    ----------------------------<  117       Magnesium: x                                3.3     |  37     |  0.79             Phosphorous: x MICU Transfer Note    Transfer from: MICU  Transfer to:  (  ) Medicine    (  ) Telemetry    (X) RCU    (  ) Palliative    (  ) Stroke Unit    (  ) _______________  Accepting physican:      MICU COURSE:  Pt transferred to MICU for tracheitis for trach exchange and JONATAN given VRE bacteremia. Tolerated procedure well, with exchange to 8 XLT.    ASSESSMENT & PLAN:   Assessment:   47M with complex PMHx significant for HTN, PFO,sarcoidosis c/b aspergillosis asthma/COPD, s/p CVA w/ L hemiplegia ((9/2017), pAfib (not on A/C 2/2 recurrent hemoptysis), s/p emergency L upper lobectomy for hemoptysis requiring IR embolization of L bronchial artery (11/2017) , s/p tracheostomy (11/24/17) and PEG placement (12/6/18) now returns from [facility] w/ acute on chronic respiratory failure, tachycardia and fevers x 1 week. Admitted with sepsis, VRE bacteremia and tracheitis. Pt remains HD stable requiring RCU admission for aggressive respiratory care.  Now being transferred to MICU for JONATAN and trach exchange.     #neuro:  - no issues, pt mentating at baseline   - on chronic opioids for pain    #CV  - sinus tachycardia; likely multifactorial 2/2 bacteremia / C.diff vs respiratory distress vs anxiety/adjustment disorder and   - c/t to monitor for now   - remains otherwise HD stable off pressor support   - hold off A/C (given bleeding history ) for pAfib     #Pulm - admitted with acute on chronic respiratory distress , now improved ; possibly 2/2 tracheitis   - plan for trach exchange in MICU today   - tolerating vent settings and periods of CPAP  - c/w aggressive chest PT and suctioning   - c/w Daptomycin / Bactrim DS   - c/w Duoneb and Symbicort inhaler   - c/w solumedrol 40mg IV qd     #ID - VRE bacteremia (BCx from 2/9; BCx negative from 2/11; RVP negative, legionella negative,UCx negative ; CT A/P negative for any source   - appreciate ID (Dr. Cope) recs; c/w  Dapto /oral Vanco while on antibiotics/ bactrim for 5 days for tracheobronchitis/MRSA  - plan for JONATAN today  - monitor fever and WBC curve.     #Endo/Rheum  - c/w sarcoidosis home medication regimen     #GI  - NPO for now for JONATAN / trach exchange   - PEG in place; can resume feeds following procedures   - PPI IV ad     #Renal/Metabolic  - renal function normal   - hypokalemia - will replete     #Heme   - H/H stable   - leukocytosis now resolved     #Psych  - c/w Ativan PRN for anxiety/adjustment disorder (home med)    #DVT ppx:   - LSQ     DISPO: Back to RCU following procedures, plan as per RCU    Vital Signs Last 24 Hrs  T(C): 36.9 (14 Feb 2018 16:18), Max: 37.1 (13 Feb 2018 18:01)  T(F): 98.5 (14 Feb 2018 16:18), Max: 98.8 (14 Feb 2018 09:51)  HR: 89 (14 Feb 2018 17:30) (68 - 107)  BP: 141/98 (14 Feb 2018 17:30) (98/74 - 141/98)  BP(mean): 110 (14 Feb 2018 17:30) (81 - 111)  RR: 16 (14 Feb 2018 17:30) (1 - 26)  SpO2: 100% (14 Feb 2018 17:30) (95% - 100%)  I&O's Summary    13 Feb 2018 07:01  -  14 Feb 2018 07:00  --------------------------------------------------------  IN: 0 mL / OUT: 200 mL / NET: -200 mL    MEDICATIONS  (STANDING):  ALBUTerol    90 MICROgram(s) HFA Inhaler 2 Puff(s) Inhalation every 6 hours  buDESOnide 160 MICROgram(s)/formoterol 4.5 MICROgram(s) Inhaler 2 Puff(s) Inhalation two times a day  chlorhexidine 4% Liquid 1 Application(s) Topical daily  DAPTOmycin IVPB      DAPTOmycin IVPB 640 milliGRAM(s) IV Intermittent every 24 hours  docusate sodium Liquid 10 milliGRAM(s) Oral daily  enoxaparin Injectable 40 milliGRAM(s) SubCutaneous daily  fentaNYL   Patch  12 MICROgram(s)/Hr 1 Patch Transdermal every 72 hours  folic acid 1 milliGRAM(s) Oral daily  ipratropium 17 MICROgram(s) HFA Inhaler 2 Puff(s) Inhalation every 6 hours  lactobacillus acidophilus 1 Tablet(s) Oral four times a day with meals  methylPREDNISolone sodium succinate Injectable 40 milliGRAM(s) IV Push daily  pantoprazole  Injectable 40 milliGRAM(s) IV Push daily  senna Syrup 10 milliLiter(s) Oral at bedtime  trimethoprim  40 mG/sulfamethoxazole 200 mG Suspension 160 milliGRAM(s) Oral every 12 hours  vancomycin    Solution 125 milliGRAM(s) Oral two times a day    MEDICATIONS  (PRN):  acetaminophen    Suspension 650 milliGRAM(s) Oral every 6 hours PRN For Temp greater than 38 C (100.4 F)  acetaminophen    Suspension 650 milliGRAM(s) Oral every 6 hours PRN mild and moderate and severe pain  ALBUTerol    90 MICROgram(s) HFA Inhaler 2 Puff(s) Inhalation every 6 hours PRN Bronchospasm  calamine Lotion 1 Application(s) Topical every 4 hours PRN Rash and/or Itching  LORazepam     Tablet 1 milliGRAM(s) Oral three times a day PRN Anxiety  oxyCODONE    IR 10 milliGRAM(s) Oral every 8 hours PRN Moderate Pain (4 - 6)        LABS                              143    |  96     |  20                  Calcium: 9.7   / iCa: x      (02-14 @ 06:10)    ----------------------------<  117       Magnesium: x                                3.3     |  37     |  0.79             Phosphorous: x

## 2018-02-14 NOTE — PROGRESS NOTE ADULT - ATTENDING COMMENTS
Andres Cope  Attending Physician   Division of Infectious Disease  Pager #[phone]  After 5pm/weekend or no response, call #[phone]    I am away and will return 2/16. ID available #[phone].

## 2018-02-14 NOTE — PROGRESS NOTE ADULT - ATTENDING COMMENTS
Sarcoidosis, complicated pulmonary history s/p trach on mechanical ventilation.  Acute on chronic respiratory failure with VRE bacteremia and MRSA tracheobronchitis.   Continue Dapto for VRE bacteremia. Continue Bactrim for MRSA tracheitis.  Will check TTE/JONATAN for bacteremia. Unclear source.  Continue empiric PO vancomycin in setting of prior C. Diff.  Plan for trach change today.  CPAP and trach collar trial as tolerated

## 2018-02-14 NOTE — PROGRESS NOTE ADULT - PROBLEM SELECTOR PLAN 1
-DC daptomycin  -ampicillin 2 gm iv q4  -repeat bcx -ve  -check TTE  -CT abd/pelvis -ve  -UCx -ve  -?lung source seems unusual

## 2018-02-14 NOTE — CHART NOTE - NSCHARTNOTEFT_GEN_A_CORE
MICU Accept Note    CHIEF COMPLAINT: acute on chronic respiratory failure, fevers     HPI / INTERVAL HISTORY:  47M with complex PMHx significant for HTN, PFO, sarcoidosis c/b aspergillosis asthma/COPD, s/p CVA w/ L hemiplegia ((9/2017),  pAfib (not on A/C 2/2 recurrent hemoptysis), s/p emergency L upper lobectomy for hemoptysis requiring IR embolization of L bronchial artery (11/2017) , s/p tracheostomy (11/24/17) and PEG placement (12/6/18) who initially presented to ER from [facility] nursing facility on 2/9 w/ acute on chronic respiratory failure, tachycardia and fevers x 1 week. Admitted with sepsis due to vancomycin resistant Enterococcus species and tracheitis. Pt admitted to RCU for aggressive respiratory care.  BCx from admission (2/9) growing VRE. BCx (2/11) NGTD.  Source of bacteremia unclear. Abdomen/plevis CT w/ IV contrast performed (2/12) and negative for any infectious source, but does reveal a new right lower lobe opacity. Per ID recs, pt has been managed on Daptomycin (Day #5) and DS Bactrim for 5 days for tracheobronchitis / MRSA. Pt has remained afebrile for over 72 hrs and leukocytosis downtrending. Pt requiring transfer to MICU at this time for JONATAN to evaluate for possible source of bacteremia and for trach exchange.     PAST MEDICAL & SURGICAL HISTORY:  History of pneumothorax: History of 3 prior pneumonthoracies.  COPD (chronic obstructive pulmonary disease)  Asthma  Hypertension  Sarcoidosis  History of thoracotomy: 9/24/17 Left thoracotomy, Left upper lobectomy   9/25/17 Reop left thoracotomy, evacuation of left hemothorax      FAMILY HISTORY:  Family history of pancreatic cancer (Father)  Family history of essential hypertension (Father)      SOCIAL HISTORY:  Smoking: [ x ] Never Smoked  [  ] Former Smoker (# packs x # years)  [  ] Current Smoker (# packs x # years)  Substance Use: never  EtOH Use: none  Marital Status: [  ] Single  [ x]   [  ]   [  ]         Allergies: No Known Allergies    REVIEW OF SYSTEMS:  Constitutional: No fevers, chills, weight loss, weight gain  HEENT: No vision problems, eye pain, nasal congestion, rhinorrhea, sore throat, dysphagia  CV: No chest pain, orthopnea, palpitations  Resp: No cough, dyspnea, wheezing, hemoptysis  GI: No nausea, vomiting, diarrhea, constipation, abdominal pain  : [ ] dysuria [ ] nocturia [ ] hematuria [ ] increased urinary frequency  Musculoskeletal: [ ] back pain [ ] myalgias [ ] arthralgias [ ] fracture  Skin: [ ] rash [ ] itch  Neurological: [ ] headache [ ] dizziness [ ] syncope [ ] weakness [ ] numbness  Psychiatric: [ ] anxiety [ ] depression  Endocrine: [ ] diabetes [ ] thyroid problem  Hematologic/Lymphatic: [ ] anemia [ ] bleeding problem  Allergic/Immunologic: [ ] itchy eyes [ ] nasal discharge [ ] hives [ ] angioedema  [ ] All other systems negative  [ ] Unable to assess ROS because ________    OBJECTIVE:  ICU Vital Signs Last 24 Hrs  T(C): 36.8 (14 Feb 2018 13:11), Max: 37.1 (13 Feb 2018 18:01)  T(F): 98.3 (14 Feb 2018 13:11), Max: 98.8 (14 Feb 2018 09:51)  HR: 93 (14 Feb 2018 13:11) (93 - 114)  BP: 130/89 (14 Feb 2018 13:11) (104/79 - 130/89)  BP(mean): --  ABP: --  ABP(mean): --  RR: 16 (14 Feb 2018 13:11) (16 - 18)  SpO2: 100% (14 Feb 2018 13:11) (93% - 100%)    Mode: CPAP with PS, FiO2: 40, PEEP: 5, PS: 10, MAP: 9    02-13 @ 07:01  -  02-14 @ 07:00  --------------------------------------------------------  IN: 0 mL / OUT: 200 mL / NET: -200 mL      CAPILLARY BLOOD GLUCOSE      PHYSICAL EXAM:  GENERAL: No acute distress, well-developed  HEAD:  Atraumatic, Normocephalic  ENT: EOMI, PERRLA, conjunctiva and sclera clear, Neck supple, No JVD, moist mucosa, no pharynageal erythema, no tonsillar enlargement or exudate  CHEST/LUNG: trach to vent; scattered expiratory wheezing bilaterally, no ronchi/rales/crackles ;  HEART: tachy, +S1/S2, no m/r/g   ABDOMEN: Soft, NT/ND, PEG In place   EXTREMITIES:  No clubbing, cyanosis, or edema  PSYCH: Nl behavior, nl affect  NEUROLOGY: AAOx3, non-focal  SKIN: Normal color, No rashes or lesions      HOSPITAL MEDICATIONS:  MEDICATIONS  (STANDING):  ALBUTerol    90 MICROgram(s) HFA Inhaler 2 Puff(s) Inhalation every 6 hours  buDESOnide 160 MICROgram(s)/formoterol 4.5 MICROgram(s) Inhaler 2 Puff(s) Inhalation two times a day  chlorhexidine 4% Liquid 1 Application(s) Topical daily  DAPTOmycin IVPB      DAPTOmycin IVPB 640 milliGRAM(s) IV Intermittent every 24 hours  docusate sodium Liquid 10 milliGRAM(s) Oral daily  enoxaparin Injectable 40 milliGRAM(s) SubCutaneous daily  fentaNYL   Patch  12 MICROgram(s)/Hr 1 Patch Transdermal every 72 hours  folic acid 1 milliGRAM(s) Oral daily  ipratropium 17 MICROgram(s) HFA Inhaler 2 Puff(s) Inhalation every 6 hours  lactobacillus acidophilus 1 Tablet(s) Oral four times a day with meals  methylPREDNISolone sodium succinate Injectable 40 milliGRAM(s) IV Push daily  pantoprazole  Injectable 40 milliGRAM(s) IV Push daily  senna Syrup 10 milliLiter(s) Oral at bedtime  trimethoprim  40 mG/sulfamethoxazole 200 mG Suspension 160 milliGRAM(s) Oral every 12 hours  vancomycin    Solution 125 milliGRAM(s) Oral two times a day    MEDICATIONS  (PRN):  acetaminophen    Suspension 650 milliGRAM(s) Oral every 6 hours PRN For Temp greater than 38 C (100.4 F)  acetaminophen    Suspension 650 milliGRAM(s) Oral every 6 hours PRN mild and moderate and severe pain  ALBUTerol    90 MICROgram(s) HFA Inhaler 2 Puff(s) Inhalation every 6 hours PRN Bronchospasm  calamine Lotion 1 Application(s) Topical every 4 hours PRN Rash and/or Itching  LORazepam     Tablet 1 milliGRAM(s) Oral three times a day PRN Anxiety  oxyCODONE    IR 10 milliGRAM(s) Oral every 8 hours PRN Moderate Pain (4 - 6)      LABS:                        7.3    7.12  )-----------( 181      ( 13 Feb 2018 06:20 )             23.2     Hgb Trend: 7.3<--, 7.2<--, 7.3<--, 7.3<--, 8.8<--  02-14    143  |  96<L>  |  20  ----------------------------<  117<H>  3.3<L>   |  37<H>  |  0.79    Ca    9.7      14 Feb 2018 06:10      Creatinine Trend: 0.79<--, 0.66<--, 0.59<--, 0.63<--, 0.75<--, 0.66<--    RVP (2/9/18): Negative     MICROBIOLOGY:     Culture - Blood (collected 11 Feb 2018 06:20)  Source: BLOOD  Preliminary Report (14 Feb 2018 06:19):    NO ORGANISMS ISOLATED    NO ORGANISMS ISOLATED AT 72 HRS.    Culture - Respiratory with Gram Stain (collected 10 Feb 2018 09:10)  Source: SPUTUM  Final Report (12 Feb 2018 11:49):    Normal Respiratory Katherine Absent    RESULT CALLED TO / READ BACK: IKM TOUSSAINT/ALESIA/ELISE    6S    DATE / TIME CALLED: 02/12/18 1149    CALLED BY: REGIS PAGE                  *******************************                  * This is an appended result. *           *******************************    A prior result that was reported as final has been changed.  Organism: Staph. aureus *MRSA* (12 Feb 2018 11:43)  Organism: Staph. aureus *MRSA*  QUANTITY OF GROWTH: MODERATE  OXICILLIN-RESISTANT staphylococci should be regarded as  RESISTANT to ALL other Beta-Lactams regardless of the  in-vitro results obtained.  These include: ALL  Penicillins, Cephalosporins, Amoxicillin-clavulanic  acid, Ticarcillin-clavulanic acid,  Ampicillin-sulbactam, and Imipenem. (12 Feb 2018 11:43)      -  Cefazolin: R 16 FELIX      -  Ciprofloxacin: R >2 FELIX      -  Clindamycin: S <=0.5 FELIX      -  Erythromycin: R >4 FELIX      -  Gentamicin: S <=4 FELIX      -  Linezolid: S 4 FELIX      -  Moxifloxacin(Aerobic): R 2 FELIX      -  Oxacillin: R >2 FELIX      -  Penicillin: R >8 FELIX      -  Rifampin: S <=1 FELIX      -  Tetra/Doxy: S <=4 FELIX      -  Trimethoprim/Sulfamethoxazole: S <=0.5/9.5 FELIX      -  Vancomycin: S 2 FELIX      Method Type: MICROSCAN POS COMBO 34    Culture - Urine (collected 09 Feb 2018 19:23)  Source: URINE MIDSTREAM  Final Report (11 Feb 2018 09:16):    NO GROWTH AT 24 HOURS    Culture - Blood (collected 09 Feb 2018 19:07)  Source: BLOOD PERIPHERAL  Preliminary Report (13 Feb 2018 19:06):    NO ORGANISMS ISOLATED    NO ORGANISMS ISOLATED AT 96 HOURS    Culture - Blood (collected 09 Feb 2018 19:02)  Source: BLOOD  Preliminary Report (10 Feb 2018 12:59):    ***Blood Panel PCR results on this specimen are available    approximately 3 hours after the Gram stain result***    Gram stain, PCR, and/or culture results may not always    correspond due to difference in methodologies    ------------------------------------------------------------    This PCR assay was performed using [employer].  The    following targets are tested for:  Enterococcus, vancomycin    resistant enterococci, Listeria monocytogenes,  coagulase    negative staphylococci, S. aureus, methicillin resistant S.    aureus, Streptococcus agalactiae (Group B), S. pneumoniae,    S. pyogenes (Group A), Acinetobacter baumannii, Enterobacter    cloacae, E. coli, Klebsiella oxytoca, K. pneumoniae, Proteus    sp., Serratia marcescens, Haemophilus influenzae, Neisseria    meningitidis, Pseudomonas aeruginosa, Candida albicans, C.    glabrata, C. krusei, C. parapsilosis, C. tropicalis and the    KPC resistance gene.  Final Report (12 Feb 2018 14:50):    RESULT CALLED TO: GRACE BRODERICK    DATE / TIME CALLED: 02/12/18 1449    CALLED BY: EMETERIO GUZMAN  Organism: BLOOD CULTURE PCR  Enterococcus faecalis VRE (12 Feb 2018 14:50)  Organism: Enterococcus faecalis VRE (12 Feb 2018 14:50)      -  Ampicillin: S <=2 FELIX      -  Daptomycin: S      -  Gentamicin 500: S <=500 FELIX      -  Linezolid: S      -  Streptomycin 1000: S <=1000 FELIX      -  Vancomycin: R >16 FELIX      Method Type: MICROSCAN POS COMBO 34  Organism: BLOOD CULTURE PCR  ***Blood Panel PCR results on this specimen are available  approximately 3 hours after the Gram stain result***  Gram stain, PCR, and/or culture results may not always  correspond due to difference in methodologies  ------------------------------------------------------------  This PCR assay was performed using [employer].  The  following targets are tested for:  Enterococcus, vancomycin  resistant enterococci, Listeria monocytogenes,  coagulase  negative staphylococci, S. aureus, methicillin resistant S.  aureus, Streptococcus agalactiae (Group B), S. pneumoniae,  S. pyogenes (Group A), Acinetobacter baumannii, Enterobacter  cloacae, E. coli, Klebsiella oxytoca, K. pneumoniae, Proteus  sp., Serratia marcescens, Haemophilus influenzae, Neisseria  meningitidis, Pseudomonas aeruginosa, Candida albicans, C.  glabrata, C. krusei, C. parapsilosis, C. tropicalis and the  KPC resistance gene. (12 Feb 2018 14:50)      -  Vancomycin resistant Enterococcus sp.: + DETECT FELIX      Method Type: PCR          RADIOLOGY & ADDITIONAL TESTS:    CT A/P with IV Contrast (2/13/18):     FINDINGS:  LOWER CHEST: Small right pleural effusion. A new partially visualized   peripheral airspace opacity in the right lower lobe which may be   infectious.  LIVER: Within normal limits.  BILE DUCTS: Normal caliber.  GALLBLADDER: Within normal limits.  SPLEEN: Within normal limits.  PANCREAS: Within normal limits.  ADRENALS: Within normal limits.  KIDNEYS/URETERS: Punctate bilateral nonobstructing renal calculi. No   hydronephrosis.  BLADDER: Within normal limits.  REPRODUCTIVE ORGANS: The prostate is within normal limits.  BOWEL: Percutaneous gastrostomy tube in the stomach. No bowel obstruction   or wall thickening. Normal appendix.  PERITONEUM: Trace ascites.  VESSELS: Within normal limits.  RETROPERITONEUM: No lymphadenopathy.    ABDOMINAL WALL: Within normal limits.  BONES: Sclerotic lesion in the inferior coccyx, nonspecific.  IMPRESSION:   Small right pleural effusion.   A new partially visualized peripheral airspace opacity in the right lower   lobe which may be infectious.  No evidence of an infectious source in the abdomen or pelvis.    Chest xray (2/11/18):   COMPARISON: Chest radiograph from 2/9/2018, CT chest 12/25/2017.    IMPRESSION:  Status post tracheostomy.  Heart size is normal.  Status post left lung surgery. Large rounded lucency overlying the left   mid to upper hemithorax is noted as on the prior study and corresponding   to the abnormality seen on the chest CT of December 25, 2017. Bilateral   linear opacities are noted probably atelectasis or scarring again as   correlated with the prior CT. Unchanged diffuse bilateral emphysematous   changes with bulla formations. Trace right pleural effusion.          Assessment:   47M with complex PMHx significant for HTN, PFO,sarcoidosis c/b aspergillosis asthma/COPD, s/p CVA w/ L hemiplegia ((9/2017), pAfib (not on A/C 2/2 recurrent hemoptysis), s/p emergency L upper lobectomy for hemoptysis requiring IR embolization of L bronchial artery (11/2017) , s/p tracheostomy (11/24/17) and PEG placement (12/6/18) now returns from [facility] w/ acute on chronic respiratory failure, tachycardia and fevers x 1 week. Admitted with sepsis, VRE bacteremia and tracheitis. Pt remains HD stable requiring RCU admission for aggressive respiratory care.  Now being transferred to MICU for JONATAN and trach exchange.     #neuro:  - no issues, pt mentating at baseline     #CV  - sinus tachycardia; likely multifactorial 2/2 bacteremia / C.diff vs respiratory distress vs anxiety/adjustment disorder and   - c/t to monitor for now   - remains otherwise HD stable off pressor support     #Pulm - admitted with acute on chronic respiratory distress , now improved ; possibly 2/2 tracheitis   - plan for trach exchange in MICU today   - tolerating vent settings and periods of CPAP  - c/w aggressive chest PT and suctioning   - c/w Daptomycin / Bactrim DS   - c/w Duoneb and Symbicort inhaler     #ID - VRE bacteremia (BCx from 2/9; BCx negative from 2/11; RVP negative, legionella negative,UCx negative ; CT A/P negative for any source   - appreciate ID (Dr. Cope) recs; c/w  Dapto /oral Vanco while on antibiotics/ bactrim for 5 days for tracheobronchitis/MRSA  - plan for JONATAN today  - monitor fever and WBC curve.     #Endo/Rheum  - c/w sarcoidosis home medication regimen     #Renal/Metabolic  -     #Heme   - H/H stable   - leukocytosis now resolved     #        Plan: MICU Accept Note    CHIEF COMPLAINT: acute on chronic respiratory failure, fevers     HPI / INTERVAL HISTORY:  47M with complex PMHx significant for HTN, PFO, sarcoidosis c/b aspergillosis asthma/COPD, s/p CVA w/ L hemiplegia ((9/2017),  pAfib (not on A/C 2/2 recurrent hemoptysis), s/p emergency L upper lobectomy for hemoptysis requiring IR embolization of L bronchial artery (11/2017) , s/p tracheostomy (11/24/17) and PEG placement (12/6/18) who initially presented to ER from [facility] nursing facility on 2/9 w/ acute on chronic respiratory failure, tachycardia and fevers x 1 week. Admitted with sepsis due to vancomycin resistant Enterococcus species and tracheitis. Pt admitted to RCU for aggressive respiratory care.  BCx from admission (2/9) growing VRE. BCx (2/11) NGTD.  Source of bacteremia unclear. Abdomen/plevis CT w/ IV contrast performed (2/12) and negative for any infectious source, but does reveal a new right lower lobe opacity. Per ID recs, pt has been managed on Daptomycin (Day #5) and DS Bactrim for 5 days for tracheobronchitis / MRSA. Pt has remained afebrile for over 72 hrs and leukocytosis downtrending. Pt requiring transfer to MICU at this time for JONATAN to evaluate for possible source of bacteremia and for trach exchange.     PAST MEDICAL & SURGICAL HISTORY:  History of pneumothorax: History of 3 prior pneumonthoracies.  COPD (chronic obstructive pulmonary disease)  Asthma  Hypertension  Sarcoidosis  History of thoracotomy: 9/24/17 Left thoracotomy, Left upper lobectomy   9/25/17 Reop left thoracotomy, evacuation of left hemothorax      FAMILY HISTORY:  Family history of pancreatic cancer (Father)  Family history of essential hypertension (Father)      SOCIAL HISTORY:  Smoking: [ x ] Never Smoked  [  ] Former Smoker (# packs x # years)  [  ] Current Smoker (# packs x # years)  Substance Use: never  EtOH Use: none  Marital Status: [  ] Single  [ x]   [  ]   [  ]         Allergies: No Known Allergies    REVIEW OF SYSTEMS:  Constitutional: No fevers, chills, weight loss, weight gain  HEENT: No vision problems, eye pain, nasal congestion, rhinorrhea, sore throat, dysphagia  CV: No chest pain, orthopnea, palpitations  Resp: No cough, dyspnea, wheezing, hemoptysis  GI: No nausea, vomiting, diarrhea, constipation, abdominal pain  : [ ] dysuria [ ] nocturia [ ] hematuria [ ] increased urinary frequency  Musculoskeletal: [ ] back pain [ ] myalgias [ ] arthralgias [ ] fracture  Skin: [ ] rash [ ] itch  Neurological: [ ] headache [ ] dizziness [ ] syncope [ ] weakness [ ] numbness  Psychiatric: [ ] anxiety [ ] depression  Endocrine: [ ] diabetes [ ] thyroid problem  Hematologic/Lymphatic: [ ] anemia [ ] bleeding problem  Allergic/Immunologic: [ ] itchy eyes [ ] nasal discharge [ ] hives [ ] angioedema  [ ] All other systems negative  [ ] Unable to assess ROS because ________    OBJECTIVE:  ICU Vital Signs Last 24 Hrs  T(C): 36.8 (14 Feb 2018 13:11), Max: 37.1 (13 Feb 2018 18:01)  T(F): 98.3 (14 Feb 2018 13:11), Max: 98.8 (14 Feb 2018 09:51)  HR: 93 (14 Feb 2018 13:11) (93 - 114)  BP: 130/89 (14 Feb 2018 13:11) (104/79 - 130/89)  BP(mean): --  ABP: --  ABP(mean): --  RR: 16 (14 Feb 2018 13:11) (16 - 18)  SpO2: 100% (14 Feb 2018 13:11) (93% - 100%)    Mode: CPAP with PS, FiO2: 40, PEEP: 5, PS: 10, MAP: 9    02-13 @ 07:01  -  02-14 @ 07:00  --------------------------------------------------------  IN: 0 mL / OUT: 200 mL / NET: -200 mL      CAPILLARY BLOOD GLUCOSE      PHYSICAL EXAM:  GENERAL: No acute distress, well-developed  HEAD:  Atraumatic, Normocephalic  ENT: EOMI, PERRLA, conjunctiva and sclera clear, Neck supple, No JVD, moist mucosa, no pharynageal erythema, no tonsillar enlargement or exudate  CHEST/LUNG: trach to vent; scattered expiratory wheezing bilaterally, no ronchi/rales/crackles ;  HEART: tachy, +S1/S2, no m/r/g   ABDOMEN: Soft, NT/ND, PEG In place   EXTREMITIES:  No clubbing, cyanosis, or edema  PSYCH: Nl behavior, nl affect  NEUROLOGY: AAOx3, non-focal  SKIN: Normal color, No rashes or lesions      HOSPITAL MEDICATIONS:  MEDICATIONS  (STANDING):  ALBUTerol    90 MICROgram(s) HFA Inhaler 2 Puff(s) Inhalation every 6 hours  buDESOnide 160 MICROgram(s)/formoterol 4.5 MICROgram(s) Inhaler 2 Puff(s) Inhalation two times a day  chlorhexidine 4% Liquid 1 Application(s) Topical daily  DAPTOmycin IVPB      DAPTOmycin IVPB 640 milliGRAM(s) IV Intermittent every 24 hours  docusate sodium Liquid 10 milliGRAM(s) Oral daily  enoxaparin Injectable 40 milliGRAM(s) SubCutaneous daily  fentaNYL   Patch  12 MICROgram(s)/Hr 1 Patch Transdermal every 72 hours  folic acid 1 milliGRAM(s) Oral daily  ipratropium 17 MICROgram(s) HFA Inhaler 2 Puff(s) Inhalation every 6 hours  lactobacillus acidophilus 1 Tablet(s) Oral four times a day with meals  methylPREDNISolone sodium succinate Injectable 40 milliGRAM(s) IV Push daily  pantoprazole  Injectable 40 milliGRAM(s) IV Push daily  senna Syrup 10 milliLiter(s) Oral at bedtime  trimethoprim  40 mG/sulfamethoxazole 200 mG Suspension 160 milliGRAM(s) Oral every 12 hours  vancomycin    Solution 125 milliGRAM(s) Oral two times a day    MEDICATIONS  (PRN):  acetaminophen    Suspension 650 milliGRAM(s) Oral every 6 hours PRN For Temp greater than 38 C (100.4 F)  acetaminophen    Suspension 650 milliGRAM(s) Oral every 6 hours PRN mild and moderate and severe pain  ALBUTerol    90 MICROgram(s) HFA Inhaler 2 Puff(s) Inhalation every 6 hours PRN Bronchospasm  calamine Lotion 1 Application(s) Topical every 4 hours PRN Rash and/or Itching  LORazepam     Tablet 1 milliGRAM(s) Oral three times a day PRN Anxiety  oxyCODONE    IR 10 milliGRAM(s) Oral every 8 hours PRN Moderate Pain (4 - 6)      LABS:                        7.3    7.12  )-----------( 181      ( 13 Feb 2018 06:20 )             23.2     Hgb Trend: 7.3<--, 7.2<--, 7.3<--, 7.3<--, 8.8<--  02-14    143  |  96<L>  |  20  ----------------------------<  117<H>  3.3<L>   |  37<H>  |  0.79    Ca    9.7      14 Feb 2018 06:10      Creatinine Trend: 0.79<--, 0.66<--, 0.59<--, 0.63<--, 0.75<--, 0.66<--    RVP (2/9/18): Negative     MICROBIOLOGY:     Culture - Blood (collected 11 Feb 2018 06:20)  Source: BLOOD  Preliminary Report (14 Feb 2018 06:19):    NO ORGANISMS ISOLATED    NO ORGANISMS ISOLATED AT 72 HRS.    Culture - Respiratory with Gram Stain (collected 10 Feb 2018 09:10)  Source: SPUTUM  Final Report (12 Feb 2018 11:49):    Normal Respiratory Katherine Absent    RESULT CALLED TO / READ BACK: KIM TOUSSAINT/ALESIA/ELISE    6S    DATE / TIME CALLED: 02/12/18 1149    CALLED BY: REGIS PAGE                  *******************************                  * This is an appended result. *           *******************************    A prior result that was reported as final has been changed.  Organism: Staph. aureus *MRSA* (12 Feb 2018 11:43)  Organism: Staph. aureus *MRSA*  QUANTITY OF GROWTH: MODERATE  OXICILLIN-RESISTANT staphylococci should be regarded as  RESISTANT to ALL other Beta-Lactams regardless of the  in-vitro results obtained.  These include: ALL  Penicillins, Cephalosporins, Amoxicillin-clavulanic  acid, Ticarcillin-clavulanic acid,  Ampicillin-sulbactam, and Imipenem. (12 Feb 2018 11:43)      -  Cefazolin: R 16 FELIX      -  Ciprofloxacin: R >2 FELIX      -  Clindamycin: S <=0.5 FELIX      -  Erythromycin: R >4 FELIX      -  Gentamicin: S <=4 FELIX      -  Linezolid: S 4 FELIX      -  Moxifloxacin(Aerobic): R 2 FELIX      -  Oxacillin: R >2 FELIX      -  Penicillin: R >8 FELIX      -  Rifampin: S <=1 FELIX      -  Tetra/Doxy: S <=4 FELIX      -  Trimethoprim/Sulfamethoxazole: S <=0.5/9.5 FELIX      -  Vancomycin: S 2 FELIX      Method Type: MICROSCAN POS COMBO 34    Culture - Urine (collected 09 Feb 2018 19:23)  Source: URINE MIDSTREAM  Final Report (11 Feb 2018 09:16):    NO GROWTH AT 24 HOURS    Culture - Blood (collected 09 Feb 2018 19:07)  Source: BLOOD PERIPHERAL  Preliminary Report (13 Feb 2018 19:06):    NO ORGANISMS ISOLATED    NO ORGANISMS ISOLATED AT 96 HOURS    Culture - Blood (collected 09 Feb 2018 19:02)  Source: BLOOD  Preliminary Report (10 Feb 2018 12:59):    ***Blood Panel PCR results on this specimen are available    approximately 3 hours after the Gram stain result***    Gram stain, PCR, and/or culture results may not always    correspond due to difference in methodologies    ------------------------------------------------------------    This PCR assay was performed using [employer].  The    following targets are tested for:  Enterococcus, vancomycin    resistant enterococci, Listeria monocytogenes,  coagulase    negative staphylococci, S. aureus, methicillin resistant S.    aureus, Streptococcus agalactiae (Group B), S. pneumoniae,    S. pyogenes (Group A), Acinetobacter baumannii, Enterobacter    cloacae, E. coli, Klebsiella oxytoca, K. pneumoniae, Proteus    sp., Serratia marcescens, Haemophilus influenzae, Neisseria    meningitidis, Pseudomonas aeruginosa, Candida albicans, C.    glabrata, C. krusei, C. parapsilosis, C. tropicalis and the    KPC resistance gene.  Final Report (12 Feb 2018 14:50):    RESULT CALLED TO: GRACE BRODERICK    DATE / TIME CALLED: 02/12/18 1449    CALLED BY: EMETERIO GUZMAN  Organism: BLOOD CULTURE PCR  Enterococcus faecalis VRE (12 Feb 2018 14:50)  Organism: Enterococcus faecalis VRE (12 Feb 2018 14:50)      -  Ampicillin: S <=2 FELIX      -  Daptomycin: S      -  Gentamicin 500: S <=500 FELIX      -  Linezolid: S      -  Streptomycin 1000: S <=1000 FELIX      -  Vancomycin: R >16 FELIX      Method Type: MICROSCAN POS COMBO 34  Organism: BLOOD CULTURE PCR  ***Blood Panel PCR results on this specimen are available  approximately 3 hours after the Gram stain result***  Gram stain, PCR, and/or culture results may not always  correspond due to difference in methodologies  ------------------------------------------------------------  This PCR assay was performed using [employer].  The  following targets are tested for:  Enterococcus, vancomycin  resistant enterococci, Listeria monocytogenes,  coagulase  negative staphylococci, S. aureus, methicillin resistant S.  aureus, Streptococcus agalactiae (Group B), S. pneumoniae,  S. pyogenes (Group A), Acinetobacter baumannii, Enterobacter  cloacae, E. coli, Klebsiella oxytoca, K. pneumoniae, Proteus  sp., Serratia marcescens, Haemophilus influenzae, Neisseria  meningitidis, Pseudomonas aeruginosa, Candida albicans, C.  glabrata, C. krusei, C. parapsilosis, C. tropicalis and the  KPC resistance gene. (12 Feb 2018 14:50)      -  Vancomycin resistant Enterococcus sp.: + DETECT FELIX      Method Type: PCR          RADIOLOGY & ADDITIONAL TESTS:    CT A/P with IV Contrast (2/13/18):     FINDINGS:  LOWER CHEST: Small right pleural effusion. A new partially visualized   peripheral airspace opacity in the right lower lobe which may be   infectious.  LIVER: Within normal limits.  BILE DUCTS: Normal caliber.  GALLBLADDER: Within normal limits.  SPLEEN: Within normal limits.  PANCREAS: Within normal limits.  ADRENALS: Within normal limits.  KIDNEYS/URETERS: Punctate bilateral nonobstructing renal calculi. No   hydronephrosis.  BLADDER: Within normal limits.  REPRODUCTIVE ORGANS: The prostate is within normal limits.  BOWEL: Percutaneous gastrostomy tube in the stomach. No bowel obstruction   or wall thickening. Normal appendix.  PERITONEUM: Trace ascites.  VESSELS: Within normal limits.  RETROPERITONEUM: No lymphadenopathy.    ABDOMINAL WALL: Within normal limits.  BONES: Sclerotic lesion in the inferior coccyx, nonspecific.  IMPRESSION:   Small right pleural effusion.   A new partially visualized peripheral airspace opacity in the right lower   lobe which may be infectious.  No evidence of an infectious source in the abdomen or pelvis.    Chest xray (2/11/18):   COMPARISON: Chest radiograph from 2/9/2018, CT chest 12/25/2017.    IMPRESSION:  Status post tracheostomy.  Heart size is normal.  Status post left lung surgery. Large rounded lucency overlying the left   mid to upper hemithorax is noted as on the prior study and corresponding   to the abnormality seen on the chest CT of December 25, 2017. Bilateral   linear opacities are noted probably atelectasis or scarring again as   correlated with the prior CT. Unchanged diffuse bilateral emphysematous   changes with bulla formations. Trace right pleural effusion.          Assessment:   47M with complex PMHx significant for HTN, PFO,sarcoidosis c/b aspergillosis asthma/COPD, s/p CVA w/ L hemiplegia ((9/2017), pAfib (not on A/C 2/2 recurrent hemoptysis), s/p emergency L upper lobectomy for hemoptysis requiring IR embolization of L bronchial artery (11/2017) , s/p tracheostomy (11/24/17) and PEG placement (12/6/18) now returns from [facility] w/ acute on chronic respiratory failure, tachycardia and fevers x 1 week. Admitted with sepsis, VRE bacteremia and tracheitis. Pt remains HD stable requiring RCU admission for aggressive respiratory care.  Now being transferred to MICU for JONATAN and trach exchange.     #neuro:  - no issues, pt mentating at baseline   - on chronic opiods for pain    #CV  - sinus tachycardia; likely multifactorial 2/2 bacteremia / C.diff vs respiratory distress vs anxiety/adjustment disorder and   - c/t to monitor for now   - remains otherwise HD stable off pressor support   - hold off A/C (given bleeding history ) for pAfib     #Pulm - admitted with acute on chronic respiratory distress , now improved ; possibly 2/2 tracheitis   - plan for trach exchange in MICU today   - tolerating vent settings and periods of CPAP  - c/w aggressive chest PT and suctioning   - c/w Daptomycin / Bactrim DS   - c/w Duoneb and Symbicort inhaler   - c/w solumedrol 40mg IV qd     #ID - VRE bacteremia (BCx from 2/9; BCx negative from 2/11; RVP negative, legionella negative,UCx negative ; CT A/P negative for any source   - appreciate ID (Dr. Cope) recs; c/w  Dapto /oral Vanco while on antibiotics/ bactrim for 5 days for tracheobronchitis/MRSA  - plan for JONATAN today  - monitor fever and WBC curve.     #Endo/Rheum  - c/w sarcoidosis home medication regimen     #GI  - NPO for now for JONATAN / trach exchange   - PEG in place; can resume feeds following procedures   - PPI IV ad     #Renal/Metabolic  - renal function normal   - hypokalemia - will replete     #Heme   - H/H stable   - leukocytosis now resolved     #Psych  - c/w Ativan PRN for anxiety/adjustment disorder (home med)    #DVT ppx:   - LSQ     DISPO: Likely back to RCU following procedures     Charlotte Macario, PGY3  MICU   [id number]        Plan:

## 2018-02-14 NOTE — CHART NOTE - NSCHARTNOTEFT_GEN_A_CORE
Procedure: Transesophageal Echocardiogram  Indication:  VRE Bacteremia. Critically ill patient.  Consent: In chart  Operators: Jay Mandel, Christiano Raymond & Zaheer Medina  Anesthesia:  Sedation with Midazolam, Fentanyl. Paralytic - Cisatracurium.     Findings:    AV:  Normal aortic valve. Lambl's excrescences noted.  No AR.    MV: Normal mitral valve. No MR.     PV:  Normal pulmonic valve.     TV: Normal tricuspid valve. Moderate TR noted.     LV: Normal systolic wall motion. Mild septal flattening noted. FAC > 50%.     RV:  RV enlarged, equal to the size of the LV. RV wall thick.     LA:  Normal left atrium. Normal ZACKERY.     RA: Normal RA.    SVC:  No respirophasic variation noted.    Aorta: Normal aorta.     PA: Normal main, right and left pulmonary artery. AT - 100 ms.     Doppler:  Note: LVOT VTI - 16.3 cm. E - 51.6 cm/s. A - 48.6 cm/s. e' - 12.4 cm/s. E/e' - 4.16.     Assessment and Plan: - No JONATAN evidence of endocarditis.   - Normal LV function. No SWMA. Mild septal flattening.   - Chronically enlarged RV. Moderate TR.

## 2018-02-14 NOTE — PROGRESS NOTE ADULT - SUBJECTIVE AND OBJECTIVE BOX
CHIEF COMPLAINT:    Interval Events:    REVIEW OF SYSTEMS:  Constitutional:   Eyes:  ENT:  CV:  Resp:  GI:  :  MSK:  Integumentary:  Neurological:  Psychiatric:  Endocrine:  Hematologic/Lymphatic:  Allergic/Immunologic:  [ ] All other systems negative  [ ] Unable to assess ROS because ________    OBJECTIVE:  ICU Vital Signs Last 24 Hrs  T(C): 36.8 (14 Feb 2018 13:11), Max: 37.1 (13 Feb 2018 18:01)  T(F): 98.3 (14 Feb 2018 13:11), Max: 98.8 (14 Feb 2018 09:51)  HR: 93 (14 Feb 2018 13:11) (93 - 114)  BP: 130/89 (14 Feb 2018 13:11) (104/79 - 130/89)  BP(mean): --  ABP: --  ABP(mean): --  RR: 16 (14 Feb 2018 13:11) (16 - 18)  SpO2: 100% (14 Feb 2018 13:11) (93% - 100%)    Mode: CPAP with PS, FiO2: 40, PEEP: 5, PS: 10, MAP: 9    02-13 @ 07:01  -  02-14 @ 07:00  --------------------------------------------------------  IN: 0 mL / OUT: 200 mL / NET: -200 mL      CAPILLARY BLOOD GLUCOSE          PHYSICAL EXAM:  General:   HEENT:   Lymph Nodes:  Neck:   Respiratory:   Cardiovascular:   Abdomen:   Extremities:   Skin:   Neurological:  Psychiatry:    HOSPITAL MEDICATIONS:  MEDICATIONS  (STANDING):  ALBUTerol    90 MICROgram(s) HFA Inhaler 2 Puff(s) Inhalation every 6 hours  buDESOnide 160 MICROgram(s)/formoterol 4.5 MICROgram(s) Inhaler 2 Puff(s) Inhalation two times a day  chlorhexidine 4% Liquid 1 Application(s) Topical daily  DAPTOmycin IVPB      DAPTOmycin IVPB 640 milliGRAM(s) IV Intermittent every 24 hours  docusate sodium Liquid 10 milliGRAM(s) Oral daily  enoxaparin Injectable 40 milliGRAM(s) SubCutaneous daily  fentaNYL   Patch  12 MICROgram(s)/Hr 1 Patch Transdermal every 72 hours  folic acid 1 milliGRAM(s) Oral daily  ipratropium 17 MICROgram(s) HFA Inhaler 2 Puff(s) Inhalation every 6 hours  lactobacillus acidophilus 1 Tablet(s) Oral four times a day with meals  methylPREDNISolone sodium succinate Injectable 40 milliGRAM(s) IV Push daily  pantoprazole  Injectable 40 milliGRAM(s) IV Push daily  senna Syrup 10 milliLiter(s) Oral at bedtime  trimethoprim  40 mG/sulfamethoxazole 200 mG Suspension 160 milliGRAM(s) Oral every 12 hours  vancomycin    Solution 125 milliGRAM(s) Oral two times a day    MEDICATIONS  (PRN):  acetaminophen    Suspension 650 milliGRAM(s) Oral every 6 hours PRN For Temp greater than 38 C (100.4 F)  acetaminophen    Suspension 650 milliGRAM(s) Oral every 6 hours PRN mild and moderate and severe pain  ALBUTerol    90 MICROgram(s) HFA Inhaler 2 Puff(s) Inhalation every 6 hours PRN Bronchospasm  calamine Lotion 1 Application(s) Topical every 4 hours PRN Rash and/or Itching  LORazepam     Tablet 1 milliGRAM(s) Oral three times a day PRN Anxiety  oxyCODONE    IR 10 milliGRAM(s) Oral every 8 hours PRN Moderate Pain (4 - 6)      LABS:                        7.3    7.12  )-----------( 181      ( 13 Feb 2018 06:20 )             23.2     02-14    143  |  96<L>  |  20  ----------------------------<  117<H>  3.3<L>   |  37<H>  |  0.79    Ca    9.7      14 Feb 2018 06:10                MICROBIOLOGY:     RADIOLOGY:  [ ] Reviewed and interpreted by me    PULMONARY FUNCTION TESTS:    EKG: CHIEF COMPLAINT: Patient is a 47y old  Male who presents with a chief complaint of acute on chronic respiratory distress (09 Feb 2018 19:01)    Interval Events: No acute events overnight    REVIEW OF SYSTEMS:  Constitutional: Feels comfortable  Eyes: No difficulty  ENT: No difficulty  CV: Denies CP  Resp: c/o intermittent SOB  GI: Hungry  : No difficulty  MSK: Left weakness  Integumentary:  Neurological:  Psychiatric:  Endocrine:  Hematologic/Lymphatic:  Allergic/Immunologic:  [x] All other systems negative  [ ] Unable to assess ROS because ________    OBJECTIVE:  ICU Vital Signs Last 24 Hrs  T(C): 36.8 (14 Feb 2018 13:11), Max: 37.1 (13 Feb 2018 18:01)  T(F): 98.3 (14 Feb 2018 13:11), Max: 98.8 (14 Feb 2018 09:51)  HR: 93 (14 Feb 2018 13:11) (93 - 114)  BP: 130/89 (14 Feb 2018 13:11) (104/79 - 130/89)  BP(mean): --  ABP: --  ABP(mean): --  RR: 16 (14 Feb 2018 13:11) (16 - 18)  SpO2: 100% (14 Feb 2018 13:11) (93% - 100%)    Mode: CPAP with PS, FiO2: 40, PEEP: 5, PS: 10, MAP: 9    02-13 @ 07:01  -  02-14 @ 07:00  --------------------------------------------------------  IN: 0 mL / OUT: 200 mL / NET: -200 mL      CAPILLARY BLOOD GLUCOSE      HOSPITAL MEDICATIONS:  MEDICATIONS  (STANDING):  ALBUTerol    90 MICROgram(s) HFA Inhaler 2 Puff(s) Inhalation every 6 hours  buDESOnide 160 MICROgram(s)/formoterol 4.5 MICROgram(s) Inhaler 2 Puff(s) Inhalation two times a day  chlorhexidine 4% Liquid 1 Application(s) Topical daily  DAPTOmycin IVPB      DAPTOmycin IVPB 640 milliGRAM(s) IV Intermittent every 24 hours  docusate sodium Liquid 10 milliGRAM(s) Oral daily  enoxaparin Injectable 40 milliGRAM(s) SubCutaneous daily  fentaNYL   Patch  12 MICROgram(s)/Hr 1 Patch Transdermal every 72 hours  folic acid 1 milliGRAM(s) Oral daily  ipratropium 17 MICROgram(s) HFA Inhaler 2 Puff(s) Inhalation every 6 hours  lactobacillus acidophilus 1 Tablet(s) Oral four times a day with meals  methylPREDNISolone sodium succinate Injectable 40 milliGRAM(s) IV Push daily  pantoprazole  Injectable 40 milliGRAM(s) IV Push daily  senna Syrup 10 milliLiter(s) Oral at bedtime  trimethoprim  40 mG/sulfamethoxazole 200 mG Suspension 160 milliGRAM(s) Oral every 12 hours  vancomycin    Solution 125 milliGRAM(s) Oral two times a day    MEDICATIONS  (PRN):  acetaminophen    Suspension 650 milliGRAM(s) Oral every 6 hours PRN For Temp greater than 38 C (100.4 F)  acetaminophen    Suspension 650 milliGRAM(s) Oral every 6 hours PRN mild and moderate and severe pain  ALBUTerol    90 MICROgram(s) HFA Inhaler 2 Puff(s) Inhalation every 6 hours PRN Bronchospasm  calamine Lotion 1 Application(s) Topical every 4 hours PRN Rash and/or Itching  LORazepam     Tablet 1 milliGRAM(s) Oral three times a day PRN Anxiety  oxyCODONE    IR 10 milliGRAM(s) Oral every 8 hours PRN Moderate Pain (4 - 6)      LABS:                        7.3    7.12  )-----------( 181      ( 13 Feb 2018 06:20 )             23.2     02-14    143  |  96<L>  |  20  ----------------------------<  117<H>  3.3<L>   |  37<H>  |  0.79    Ca    9.7      14 Feb 2018 06:10                MICROBIOLOGY:     RADIOLOGY:  [ ] Reviewed and interpreted by me    PULMONARY FUNCTION TESTS:    EKG:

## 2018-02-14 NOTE — PROGRESS NOTE ADULT - PROBLEM SELECTOR PLAN 1
-VRE bacteremia.  - Afebrile  - leukocytosis downtrending  - cont with Dapto /oral Vanco while on antibiotics/ bactrim for 5 days for tracheobronchitis/MRSA  - RVP negative  - legionella negative  - blood cultures with VRE. Follow up with TTE  - urine culture negative, Abd/pelvic CT negative for source of bacteremia  - monitor fever and WBC curve

## 2018-02-14 NOTE — PROGRESS NOTE ADULT - PROBLEM SELECTOR PLAN 2
- now improved  - tolerating vent settings and periods of CPAP  - Tidal volume stable with cuff overinflated to 13cc  -chest PT  - trach care daily. Trach change when MICU bed available.  - suction prn

## 2018-02-14 NOTE — PROGRESS NOTE ADULT - SUBJECTIVE AND OBJECTIVE BOX
CHACKOOSCAR 47y [id number]    Patient is a 47y old  Male who presents with a chief complaint of acute on chronic respiratory distress (09 Feb 2018 19:01)      Follow Up/CC:  bacteremia    Interval History/ROS: no acute issues    Allergies    No Known Allergies    Intolerances        ANTIMICROBIALS:  DAPTOmycin IVPB    DAPTOmycin IVPB 640 every 24 hours  trimethoprim  40 mG/sulfamethoxazole 200 mG Suspension 160 every 12 hours  vancomycin    Solution 125 two times a day      MEDICATIONS  (STANDING):  ALBUTerol    90 MICROgram(s) HFA Inhaler 2 Puff(s) Inhalation every 6 hours  buDESOnide 160 MICROgram(s)/formoterol 4.5 MICROgram(s) Inhaler 2 Puff(s) Inhalation two times a day  chlorhexidine 4% Liquid 1 Application(s) Topical daily  DAPTOmycin IVPB      DAPTOmycin IVPB 640 milliGRAM(s) IV Intermittent every 24 hours  docusate sodium Liquid 10 milliGRAM(s) Oral daily  enoxaparin Injectable 40 milliGRAM(s) SubCutaneous daily  fentaNYL   Patch  12 MICROgram(s)/Hr 1 Patch Transdermal every 72 hours  folic acid 1 milliGRAM(s) Oral daily  ipratropium 17 MICROgram(s) HFA Inhaler 2 Puff(s) Inhalation every 6 hours  lactobacillus acidophilus 1 Tablet(s) Oral four times a day with meals  methylPREDNISolone sodium succinate Injectable 40 milliGRAM(s) IV Push daily  pantoprazole  Injectable 40 milliGRAM(s) IV Push daily  senna Syrup 10 milliLiter(s) Oral at bedtime  trimethoprim  40 mG/sulfamethoxazole 200 mG Suspension 160 milliGRAM(s) Oral every 12 hours  vancomycin    Solution 125 milliGRAM(s) Oral two times a day    MEDICATIONS  (PRN):  acetaminophen    Suspension 650 milliGRAM(s) Oral every 6 hours PRN For Temp greater than 38 C (100.4 F)  acetaminophen    Suspension 650 milliGRAM(s) Oral every 6 hours PRN mild and moderate and severe pain  ALBUTerol    90 MICROgram(s) HFA Inhaler 2 Puff(s) Inhalation every 6 hours PRN Bronchospasm  calamine Lotion 1 Application(s) Topical every 4 hours PRN Rash and/or Itching  LORazepam     Tablet 1 milliGRAM(s) Oral three times a day PRN Anxiety  oxyCODONE    IR 10 milliGRAM(s) Oral every 8 hours PRN Moderate Pain (4 - 6)        Vital Signs Last 24 Hrs  T(C): 36.8 (14 Feb 2018 13:11), Max: 37.1 (13 Feb 2018 18:01)  T(F): 98.3 (14 Feb 2018 13:11), Max: 98.8 (14 Feb 2018 09:51)  HR: 93 (14 Feb 2018 13:11) (93 - 114)  BP: 130/89 (14 Feb 2018 13:11) (104/79 - 130/89)  BP(mean): --  RR: 16 (14 Feb 2018 13:11) (16 - 18)  SpO2: 100% (14 Feb 2018 13:11) (93% - 100%)    CBC Full  -  ( 13 Feb 2018 06:20 )  WBC Count : 7.12 K/uL  Hemoglobin : 7.3 g/dL  Hematocrit : 23.2 %  Platelet Count - Automated : 181 K/uL  Mean Cell Volume : 91.7 fL  Mean Cell Hemoglobin : 28.9 pg  Mean Cell Hemoglobin Concentration : 31.5 %  Auto Neutrophil # : x  Auto Lymphocyte # : x  Auto Monocyte # : x  Auto Eosinophil # : x  Auto Basophil # : x  Auto Neutrophil % : x  Auto Lymphocyte % : x  Auto Monocyte % : x  Auto Eosinophil % : x  Auto Basophil % : x    02-14    143  |  96<L>  |  20  ----------------------------<  117<H>  3.3<L>   |  37<H>  |  0.79    Ca    9.7      14 Feb 2018 06:10            MICROBIOLOGY:  BLOOD  02-11-18 --  --  --      SPUTUM  02-10-18 --  --  Staph. aureus *MRSA*      URINE MIDSTREAM  02-09-18 --  --  --      BLOOD PERIPHERAL  02-09-18 --  --  --      BLOOD  02-09-18 --  --  BLOOD CULTURE PCR  Enterococcus faecalis VRE    RADIOLOGY    CT Abdomen and Pelvis w/ IV Cont (02.12.18 @ 17:06) >  Small right pleural effusion.     A new partially visualized peripheral airspace opacity in the right lower   lobe which may be infectious.    No evidence of an infectious source in the abdomen or pelvis.    < end of copied text >

## 2018-02-15 LAB
BUN SERPL-MCNC: 20 MG/DL — SIGNIFICANT CHANGE UP (ref 7–23)
CALCIUM SERPL-MCNC: 9.3 MG/DL — SIGNIFICANT CHANGE UP (ref 8.4–10.5)
CHLORIDE SERPL-SCNC: 97 MMOL/L — LOW (ref 98–107)
CK MB BLD-MCNC: 1.21 NG/ML — SIGNIFICANT CHANGE UP (ref 1–6.6)
CK SERPL-CCNC: 35 U/L — SIGNIFICANT CHANGE UP (ref 30–200)
CO2 SERPL-SCNC: 34 MMOL/L — HIGH (ref 22–31)
CREAT SERPL-MCNC: 0.77 MG/DL — SIGNIFICANT CHANGE UP (ref 0.5–1.3)
GLUCOSE SERPL-MCNC: 112 MG/DL — HIGH (ref 70–99)
POTASSIUM SERPL-MCNC: 3.8 MMOL/L — SIGNIFICANT CHANGE UP (ref 3.5–5.3)
POTASSIUM SERPL-SCNC: 3.8 MMOL/L — SIGNIFICANT CHANGE UP (ref 3.5–5.3)
SODIUM SERPL-SCNC: 140 MMOL/L — SIGNIFICANT CHANGE UP (ref 135–145)
TROPONIN T SERPL-MCNC: < 0.06 NG/ML — SIGNIFICANT CHANGE UP (ref 0–0.06)

## 2018-02-15 PROCEDURE — 93010 ELECTROCARDIOGRAM REPORT: CPT

## 2018-02-15 PROCEDURE — 71045 X-RAY EXAM CHEST 1 VIEW: CPT | Mod: 26

## 2018-02-15 PROCEDURE — 99232 SBSQ HOSP IP/OBS MODERATE 35: CPT

## 2018-02-15 PROCEDURE — 99233 SBSQ HOSP IP/OBS HIGH 50: CPT

## 2018-02-15 RX ORDER — AMPICILLIN TRIHYDRATE 250 MG
2 CAPSULE ORAL EVERY 4 HOURS
Qty: 0 | Refills: 0 | Status: DISCONTINUED | OUTPATIENT
Start: 2018-02-15 | End: 2018-02-26

## 2018-02-15 RX ADMIN — Medication 1 TABLET(S): at 10:07

## 2018-02-15 RX ADMIN — Medication 2 PUFF(S): at 04:40

## 2018-02-15 RX ADMIN — Medication 160 MILLIGRAM(S): at 06:04

## 2018-02-15 RX ADMIN — Medication 216 GRAM(S): at 12:10

## 2018-02-15 RX ADMIN — FENTANYL CITRATE 1 PATCH: 50 INJECTION INTRAVENOUS at 22:27

## 2018-02-15 RX ADMIN — Medication 125 MILLIGRAM(S): at 06:04

## 2018-02-15 RX ADMIN — Medication 650 MILLIGRAM(S): at 01:43

## 2018-02-15 RX ADMIN — BUDESONIDE AND FORMOTEROL FUMARATE DIHYDRATE 2 PUFF(S): 160; 4.5 AEROSOL RESPIRATORY (INHALATION) at 11:02

## 2018-02-15 RX ADMIN — BUDESONIDE AND FORMOTEROL FUMARATE DIHYDRATE 2 PUFF(S): 160; 4.5 AEROSOL RESPIRATORY (INHALATION) at 20:12

## 2018-02-15 RX ADMIN — CHLORHEXIDINE GLUCONATE 1 APPLICATION(S): 213 SOLUTION TOPICAL at 12:53

## 2018-02-15 RX ADMIN — Medication 650 MILLIGRAM(S): at 16:13

## 2018-02-15 RX ADMIN — Medication 2 PUFF(S): at 20:12

## 2018-02-15 RX ADMIN — Medication 216 GRAM(S): at 16:13

## 2018-02-15 RX ADMIN — Medication 125 MILLIGRAM(S): at 18:06

## 2018-02-15 RX ADMIN — Medication 40 MILLIGRAM(S): at 06:04

## 2018-02-15 RX ADMIN — Medication 1 MILLIGRAM(S): at 12:52

## 2018-02-15 RX ADMIN — Medication 160 MILLIGRAM(S): at 18:06

## 2018-02-15 RX ADMIN — Medication 1 MILLIGRAM(S): at 22:27

## 2018-02-15 RX ADMIN — ENOXAPARIN SODIUM 40 MILLIGRAM(S): 100 INJECTION SUBCUTANEOUS at 12:52

## 2018-02-15 RX ADMIN — Medication 2 PUFF(S): at 15:30

## 2018-02-15 RX ADMIN — Medication 216 GRAM(S): at 20:22

## 2018-02-15 RX ADMIN — Medication 1 TABLET(S): at 12:52

## 2018-02-15 RX ADMIN — PANTOPRAZOLE SODIUM 40 MILLIGRAM(S): 20 TABLET, DELAYED RELEASE ORAL at 12:52

## 2018-02-15 RX ADMIN — Medication 2 PUFF(S): at 11:02

## 2018-02-15 RX ADMIN — FENTANYL CITRATE 1 PATCH: 50 INJECTION INTRAVENOUS at 22:23

## 2018-02-15 RX ADMIN — Medication 1 TABLET(S): at 21:06

## 2018-02-15 RX ADMIN — Medication 1 TABLET(S): at 18:06

## 2018-02-15 NOTE — PROGRESS NOTE ADULT - SUBJECTIVE AND OBJECTIVE BOX
Follow Up:      Inverval History/ROS:Patient is a 47y old  Male who presents with a chief complaint of acute on chronic respiratory distress (09 Feb 2018 19:01)    Rx to floor.   No fever.    No events.    OOB to chair/ on vent.      Allergies    No Known Allergies    Intolerances        ANTIMICROBIALS:  ampicillin  IVPB 2 every 4 hours  trimethoprim  40 mG/sulfamethoxazole 200 mG Suspension 160 every 12 hours  vancomycin    Solution 125 two times a day      OTHER MEDS:  acetaminophen    Suspension 650 milliGRAM(s) Oral every 6 hours PRN  acetaminophen    Suspension 650 milliGRAM(s) Oral every 6 hours PRN  ALBUTerol    90 MICROgram(s) HFA Inhaler 2 Puff(s) Inhalation every 6 hours  ALBUTerol    90 MICROgram(s) HFA Inhaler 2 Puff(s) Inhalation every 6 hours PRN  buDESOnide 160 MICROgram(s)/formoterol 4.5 MICROgram(s) Inhaler 2 Puff(s) Inhalation two times a day  calamine Lotion 1 Application(s) Topical every 4 hours PRN  chlorhexidine 4% Liquid 1 Application(s) Topical daily  docusate sodium Liquid 10 milliGRAM(s) Oral daily  enoxaparin Injectable 40 milliGRAM(s) SubCutaneous daily  fentaNYL   Patch  12 MICROgram(s)/Hr 1 Patch Transdermal every 72 hours  folic acid 1 milliGRAM(s) Oral daily  ipratropium 17 MICROgram(s) HFA Inhaler 2 Puff(s) Inhalation every 6 hours  lactobacillus acidophilus 1 Tablet(s) Oral four times a day with meals  LORazepam     Tablet 1 milliGRAM(s) Oral three times a day PRN  methylPREDNISolone sodium succinate Injectable 30 milliGRAM(s) IV Push daily  oxyCODONE    IR 10 milliGRAM(s) Oral every 8 hours PRN  pantoprazole  Injectable 40 milliGRAM(s) IV Push daily  senna Syrup 10 milliLiter(s) Oral at bedtime      Vital Signs Last 24 Hrs  T(C): 37.1 (15 Feb 2018 10:04), Max: 37.1 (15 Feb 2018 10:04)  T(F): 98.8 (15 Feb 2018 10:04), Max: 98.8 (15 Feb 2018 10:04)  HR: 86 (15 Feb 2018 10:58) (68 - 107)  BP: 127/82 (15 Feb 2018 10:04) (83/57 - 168/114)  BP(mean): 76 (14 Feb 2018 21:00) (65 - 129)  RR: 17 (15 Feb 2018 10:04) (1 - 26)  SpO2: 100% (15 Feb 2018 10:58) (99% - 100%)    PHYSICAL EXAM:  General: x[ ] non-toxic  HEAD/EYES: [ ] PERRL [ x] white sclera [ ] icterus  ENT:  [ ] normal [ ] supple [ ] thrush [x ] trach  Cardiovascular:   [ ] murmur [x ] normal [ ] PPM/AICD  Respiratory:  [ x] clear to ausculation bilaterally  GI:  [x ] soft, non-tender, normal bowel sounds  :  [ ] mock [ ] no CVA tenderness   Musculoskeletal:  [x ] no synovitis  Neurologic:  [x ] non-focal exam   Skin:  [ x] no rash  Lymph: [ ] no lymphadenopathy  Psychiatric:  [ ] appropriate affect [ ] alert & oriented  Lines:  [ ] no phlebitis [ ] central line              02-15    140  |  97<L>  |  20  ----------------------------<  112<H>  3.8   |  34<H>  |  0.77    Ca    9.3      15 Feb 2018 06:35            MICROBIOLOGY:Culture - Respiratory:   Normal Respiratory Katherine Absent  RESULT CALLED TO / READ BACK: KIM TOUSSAINT/ALESIA/ELISE    6S  DATE / TIME CALLED: 02/12/18 1149  CALLED BY: REGIS PAGE                *******************************                * This is an appended result. *         *******************************  A prior result that was reported as final has been changed. (02-10-18 @ 09:10)      RADIOLOGY:

## 2018-02-15 NOTE — PROGRESS NOTE ADULT - PROBLEM SELECTOR PLAN 1
-VRE bacteremia.  - Afebrile  - leukocytosis downtrending  - change Dapto to ampicillin IV /oral Vanco while on antibiotics/ bactrim for 5 days for tracheobronchitis/MRSA  - RVP negative  - legionella negative  - blood cultures with VRE. Will send a repeat to ensure negative bld cx    -JONATAN done in MICU. Essentially negative  - urine culture negative, Abd/pelvic CT negative for source of bacteremia  - monitor fever and WBC curve

## 2018-02-15 NOTE — PROGRESS NOTE ADULT - PROBLEM SELECTOR PLAN 1
-Continue ampicillin  -ampicillin 2 gm iv q4  - Check one more blood culture  - CT a/p and JONATAN without focus

## 2018-02-15 NOTE — PROGRESS NOTE ADULT - PROBLEM SELECTOR PLAN 2
- now improved. Trach changed to #8 XLT  - tolerating vent settings and longer periods of CPAP  - Tidal volume now stable after trach change  -chest PT  - trach care daily.   - suction prn

## 2018-02-15 NOTE — PROGRESS NOTE ADULT - ATTENDING COMMENTS
Sarcoidosis, complicated pulmonary history s/p trach on mechanical ventilation.  Acute on chronic respiratory failure with VRE bacteremia and MRSA tracheobronchitis.   Switched to ampicillin for VRE bacteremia - ? duration. Unclear etiology. JONATAN negative for valvular involcement.  Continue Bactrim for MRSA tracheitis.  Continue empiric PO vancomycin in setting of prior C. Diff.  Trach changed 2/14 to size 8 XLT.  CPAP and trach collar trial as tolerated.

## 2018-02-15 NOTE — PROVIDER CONTACT NOTE (OTHER) - ACTION/TREATMENT ORDERED:
EKG performed. cardiac enzymes drawn, vitals done. pt on a/c mode on vent. tylenol given. will continue to monitor

## 2018-02-15 NOTE — PROGRESS NOTE ADULT - SUBJECTIVE AND OBJECTIVE BOX
CHIEF COMPLAINT:    Interval Events:    REVIEW OF SYSTEMS:  Constitutional:   Eyes:  ENT:  CV:  Resp:  GI:  :  MSK:  Integumentary:  Neurological:  Psychiatric:  Endocrine:  Hematologic/Lymphatic:  Allergic/Immunologic:  [ ] All other systems negative  [ ] Unable to assess ROS because ________    OBJECTIVE:  ICU Vital Signs Last 24 Hrs  T(C): 37.1 (15 Feb 2018 10:04), Max: 37.1 (15 Feb 2018 10:04)  T(F): 98.8 (15 Feb 2018 10:04), Max: 98.8 (15 Feb 2018 10:04)  HR: 98 (15 Feb 2018 10:04) (68 - 107)  BP: 127/82 (15 Feb 2018 10:04) (83/57 - 168/114)  BP(mean): 76 (14 Feb 2018 21:00) (65 - 129)  ABP: --  ABP(mean): --  RR: 17 (15 Feb 2018 10:04) (1 - 26)  SpO2: 100% (15 Feb 2018 10:04) (99% - 100%)    Mode: AC/ CMV (Assist Control/ Continuous Mandatory Ventilation), RR (machine): 16, TV (machine): 500, FiO2: 40, PEEP: 5, ITime: 1, MAP: 12, PIP: 34    02-14 @ 07:01  -  02-15 @ 07:00  --------------------------------------------------------  IN: 38.5 mL / OUT: 700 mL / NET: -661.5 mL      CAPILLARY BLOOD GLUCOSE          PHYSICAL EXAM:  General:   HEENT:   Lymph Nodes:  Neck:   Respiratory:   Cardiovascular:   Abdomen:   Extremities:   Skin:   Neurological:  Psychiatry:    HOSPITAL MEDICATIONS:  MEDICATIONS  (STANDING):  ALBUTerol    90 MICROgram(s) HFA Inhaler 2 Puff(s) Inhalation every 6 hours  ampicillin  IVPB 2 Gram(s) IV Intermittent every 4 hours  buDESOnide 160 MICROgram(s)/formoterol 4.5 MICROgram(s) Inhaler 2 Puff(s) Inhalation two times a day  chlorhexidine 4% Liquid 1 Application(s) Topical daily  docusate sodium Liquid 10 milliGRAM(s) Oral daily  enoxaparin Injectable 40 milliGRAM(s) SubCutaneous daily  fentaNYL   Patch  12 MICROgram(s)/Hr 1 Patch Transdermal every 72 hours  folic acid 1 milliGRAM(s) Oral daily  ipratropium 17 MICROgram(s) HFA Inhaler 2 Puff(s) Inhalation every 6 hours  lactobacillus acidophilus 1 Tablet(s) Oral four times a day with meals  methylPREDNISolone sodium succinate Injectable 30 milliGRAM(s) IV Push daily  pantoprazole  Injectable 40 milliGRAM(s) IV Push daily  senna Syrup 10 milliLiter(s) Oral at bedtime  trimethoprim  40 mG/sulfamethoxazole 200 mG Suspension 160 milliGRAM(s) Oral every 12 hours  vancomycin    Solution 125 milliGRAM(s) Oral two times a day    MEDICATIONS  (PRN):  acetaminophen    Suspension 650 milliGRAM(s) Oral every 6 hours PRN For Temp greater than 38 C (100.4 F)  acetaminophen    Suspension 650 milliGRAM(s) Oral every 6 hours PRN mild and moderate and severe pain  ALBUTerol    90 MICROgram(s) HFA Inhaler 2 Puff(s) Inhalation every 6 hours PRN Bronchospasm  calamine Lotion 1 Application(s) Topical every 4 hours PRN Rash and/or Itching  LORazepam     Tablet 1 milliGRAM(s) Oral three times a day PRN Anxiety  oxyCODONE    IR 10 milliGRAM(s) Oral every 8 hours PRN Moderate Pain (4 - 6)      LABS:    02-15    140  |  97<L>  |  20  ----------------------------<  112<H>  3.8   |  34<H>  |  0.77    Ca    9.3      15 Feb 2018 06:35                MICROBIOLOGY:     RADIOLOGY:  [ ] Reviewed and interpreted by me    PULMONARY FUNCTION TESTS:    EKG: CHIEF COMPLAINT: Patient is a 47y old  Male who presents with a chief complaint of acute on chronic respiratory distress (09 Feb 2018 19:01)    Interval Events: To MICU and back for trach change and JONATAN    REVIEW OF SYSTEMS:  Constitutional: Feels ok  Eyes: Denies difficulty  ENT: No difficulty  CV: Denies CP  Resp: Intermittent SOB, cough  GI: Hungry  : No difficulty  MSK: Left weakness  Integumentary:  Neurological:  Psychiatric: Wants to go home  Endocrine:  Hematologic/Lymphatic:  Allergic/Immunologic:  [x] All other systems negative  [ ] Unable to assess ROS because ________    OBJECTIVE:  ICU Vital Signs Last 24 Hrs  T(C): 37.1 (15 Feb 2018 10:04), Max: 37.1 (15 Feb 2018 10:04)  T(F): 98.8 (15 Feb 2018 10:04), Max: 98.8 (15 Feb 2018 10:04)  HR: 98 (15 Feb 2018 10:04) (68 - 107)  BP: 127/82 (15 Feb 2018 10:04) (83/57 - 168/114)  BP(mean): 76 (14 Feb 2018 21:00) (65 - 129)  ABP: --  ABP(mean): --  RR: 17 (15 Feb 2018 10:04) (1 - 26)  SpO2: 100% (15 Feb 2018 10:04) (99% - 100%)    Mode: AC/ CMV (Assist Control/ Continuous Mandatory Ventilation), RR (machine): 16, TV (machine): 500, FiO2: 40, PEEP: 5, ITime: 1, MAP: 12, PIP: 34    02-14 @ 07:01  -  02-15 @ 07:00  --------------------------------------------------------  IN: 38.5 mL / OUT: 700 mL / NET: -661.5 mL      CAPILLARY BLOOD GLUCOSE      HOSPITAL MEDICATIONS:  MEDICATIONS  (STANDING):  ALBUTerol    90 MICROgram(s) HFA Inhaler 2 Puff(s) Inhalation every 6 hours  ampicillin  IVPB 2 Gram(s) IV Intermittent every 4 hours  buDESOnide 160 MICROgram(s)/formoterol 4.5 MICROgram(s) Inhaler 2 Puff(s) Inhalation two times a day  chlorhexidine 4% Liquid 1 Application(s) Topical daily  docusate sodium Liquid 10 milliGRAM(s) Oral daily  enoxaparin Injectable 40 milliGRAM(s) SubCutaneous daily  fentaNYL   Patch  12 MICROgram(s)/Hr 1 Patch Transdermal every 72 hours  folic acid 1 milliGRAM(s) Oral daily  ipratropium 17 MICROgram(s) HFA Inhaler 2 Puff(s) Inhalation every 6 hours  lactobacillus acidophilus 1 Tablet(s) Oral four times a day with meals  methylPREDNISolone sodium succinate Injectable 30 milliGRAM(s) IV Push daily  pantoprazole  Injectable 40 milliGRAM(s) IV Push daily  senna Syrup 10 milliLiter(s) Oral at bedtime  trimethoprim  40 mG/sulfamethoxazole 200 mG Suspension 160 milliGRAM(s) Oral every 12 hours  vancomycin    Solution 125 milliGRAM(s) Oral two times a day    MEDICATIONS  (PRN):  acetaminophen    Suspension 650 milliGRAM(s) Oral every 6 hours PRN For Temp greater than 38 C (100.4 F)  acetaminophen    Suspension 650 milliGRAM(s) Oral every 6 hours PRN mild and moderate and severe pain  ALBUTerol    90 MICROgram(s) HFA Inhaler 2 Puff(s) Inhalation every 6 hours PRN Bronchospasm  calamine Lotion 1 Application(s) Topical every 4 hours PRN Rash and/or Itching  LORazepam     Tablet 1 milliGRAM(s) Oral three times a day PRN Anxiety  oxyCODONE    IR 10 milliGRAM(s) Oral every 8 hours PRN Moderate Pain (4 - 6)      LABS:    02-15    140  |  97<L>  |  20  ----------------------------<  112<H>  3.8   |  34<H>  |  0.77    Ca    9.3      15 Feb 2018 06:35                MICROBIOLOGY:     RADIOLOGY:  [ ] Reviewed and interpreted by me    PULMONARY FUNCTION TESTS:    EKG:

## 2018-02-16 LAB
BACTERIA BLD CULT: SIGNIFICANT CHANGE UP
SPECIMEN SOURCE: SIGNIFICANT CHANGE UP

## 2018-02-16 PROCEDURE — 99233 SBSQ HOSP IP/OBS HIGH 50: CPT

## 2018-02-16 PROCEDURE — 99232 SBSQ HOSP IP/OBS MODERATE 35: CPT

## 2018-02-16 RX ORDER — OXYCODONE HYDROCHLORIDE 5 MG/1
10 TABLET ORAL EVERY 8 HOURS
Qty: 0 | Refills: 0 | Status: DISCONTINUED | OUTPATIENT
Start: 2018-02-16 | End: 2018-02-23

## 2018-02-16 RX ADMIN — Medication 2 PUFF(S): at 15:56

## 2018-02-16 RX ADMIN — Medication 30 MILLIGRAM(S): at 06:37

## 2018-02-16 RX ADMIN — BUDESONIDE AND FORMOTEROL FUMARATE DIHYDRATE 2 PUFF(S): 160; 4.5 AEROSOL RESPIRATORY (INHALATION) at 20:02

## 2018-02-16 RX ADMIN — Medication 125 MILLIGRAM(S): at 06:36

## 2018-02-16 RX ADMIN — ENOXAPARIN SODIUM 40 MILLIGRAM(S): 100 INJECTION SUBCUTANEOUS at 11:22

## 2018-02-16 RX ADMIN — Medication 1 TABLET(S): at 12:45

## 2018-02-16 RX ADMIN — Medication 1 TABLET(S): at 08:18

## 2018-02-16 RX ADMIN — PANTOPRAZOLE SODIUM 40 MILLIGRAM(S): 20 TABLET, DELAYED RELEASE ORAL at 11:22

## 2018-02-16 RX ADMIN — Medication 160 MILLIGRAM(S): at 06:37

## 2018-02-16 RX ADMIN — Medication 216 GRAM(S): at 12:45

## 2018-02-16 RX ADMIN — Medication 2 PUFF(S): at 04:48

## 2018-02-16 RX ADMIN — Medication 2 PUFF(S): at 20:02

## 2018-02-16 RX ADMIN — Medication 2 PUFF(S): at 08:56

## 2018-02-16 RX ADMIN — Medication 216 GRAM(S): at 08:18

## 2018-02-16 RX ADMIN — BUDESONIDE AND FORMOTEROL FUMARATE DIHYDRATE 2 PUFF(S): 160; 4.5 AEROSOL RESPIRATORY (INHALATION) at 08:55

## 2018-02-16 RX ADMIN — Medication 216 GRAM(S): at 05:18

## 2018-02-16 RX ADMIN — Medication 160 MILLIGRAM(S): at 17:09

## 2018-02-16 RX ADMIN — Medication 1 MILLIGRAM(S): at 12:46

## 2018-02-16 RX ADMIN — Medication 216 GRAM(S): at 16:54

## 2018-02-16 RX ADMIN — Medication 1 TABLET(S): at 16:55

## 2018-02-16 RX ADMIN — CHLORHEXIDINE GLUCONATE 1 APPLICATION(S): 213 SOLUTION TOPICAL at 12:45

## 2018-02-16 RX ADMIN — Medication 1 MILLIGRAM(S): at 21:35

## 2018-02-16 RX ADMIN — Medication 125 MILLIGRAM(S): at 17:09

## 2018-02-16 RX ADMIN — Medication 216 GRAM(S): at 00:28

## 2018-02-16 RX ADMIN — Medication 10 MILLIGRAM(S): at 11:21

## 2018-02-16 RX ADMIN — Medication 216 GRAM(S): at 20:42

## 2018-02-16 RX ADMIN — Medication 1 TABLET(S): at 21:35

## 2018-02-16 RX ADMIN — Medication 216 GRAM(S): at 23:51

## 2018-02-16 NOTE — PROGRESS NOTE ADULT - ATTENDING COMMENTS
Andres Cope  Attending Physician   Division of Infectious Disease  Pager #[phone]  After 5pm/weekend or no response, call #[phone]    I am away and will return 2/26. ID available #[phone].    ID coverage available over 3 day holiday weekend if needed. Call #[phone] for questions/concerns. Agree with plan.    Continue ampiciilin through 2/25. Unasyn is an alternative but still q 6  Stop po vanco at conclusion of abx course.

## 2018-02-16 NOTE — PROGRESS NOTE ADULT - SUBJECTIVE AND OBJECTIVE BOX
OSCAR CHACKO 47y MRN-[id number]    Patient is a 47y old  Male who presents with a chief complaint of acute on chronic respiratory distress (09 Feb 2018 19:01)      Follow Up/CC:  bacteremia    Interval History/ROS: feels ok    Allergies    No Known Allergies    Intolerances        ANTIMICROBIALS:  ampicillin  IVPB 2 every 4 hours  trimethoprim  40 mG/sulfamethoxazole 200 mG Suspension 160 every 12 hours  vancomycin    Solution 125 two times a day      MEDICATIONS  (STANDING):  ALBUTerol    90 MICROgram(s) HFA Inhaler 2 Puff(s) Inhalation every 6 hours  ampicillin  IVPB 2 Gram(s) IV Intermittent every 4 hours  buDESOnide 160 MICROgram(s)/formoterol 4.5 MICROgram(s) Inhaler 2 Puff(s) Inhalation two times a day  chlorhexidine 4% Liquid 1 Application(s) Topical daily  docusate sodium Liquid 10 milliGRAM(s) Oral daily  enoxaparin Injectable 40 milliGRAM(s) SubCutaneous daily  folic acid 1 milliGRAM(s) Oral daily  ipratropium 17 MICROgram(s) HFA Inhaler 2 Puff(s) Inhalation every 6 hours  lactobacillus acidophilus 1 Tablet(s) Oral four times a day with meals  methylPREDNISolone sodium succinate Injectable 30 milliGRAM(s) IV Push daily  pantoprazole  Injectable 40 milliGRAM(s) IV Push daily  senna Syrup 10 milliLiter(s) Oral at bedtime  trimethoprim  40 mG/sulfamethoxazole 200 mG Suspension 160 milliGRAM(s) Oral every 12 hours  vancomycin    Solution 125 milliGRAM(s) Oral two times a day    MEDICATIONS  (PRN):  acetaminophen    Suspension 650 milliGRAM(s) Oral every 6 hours PRN For Temp greater than 38 C (100.4 F)  acetaminophen    Suspension 650 milliGRAM(s) Oral every 6 hours PRN mild and moderate and severe pain  ALBUTerol    90 MICROgram(s) HFA Inhaler 2 Puff(s) Inhalation every 6 hours PRN Bronchospasm  calamine Lotion 1 Application(s) Topical every 4 hours PRN Rash and/or Itching  LORazepam     Tablet 1 milliGRAM(s) Oral three times a day PRN Anxiety  oxyCODONE    IR 10 milliGRAM(s) Oral every 8 hours PRN Moderate Pain (4 - 6)        Vital Signs Last 24 Hrs  T(C): 36.8 (16 Feb 2018 10:45), Max: 37 (16 Feb 2018 01:15)  T(F): 98.3 (16 Feb 2018 10:45), Max: 98.6 (16 Feb 2018 01:15)  HR: 95 (16 Feb 2018 11:06) (87 - 100)  BP: 111/76 (16 Feb 2018 10:45) (99/71 - 121/83)  BP(mean): 86 (16 Feb 2018 10:45) (86 - 86)  RR: 18 (16 Feb 2018 10:45) (16 - 18)  SpO2: 99% (16 Feb 2018 11:06) (99% - 100%)      02-15    140  |  97<L>  |  20  ----------------------------<  112<H>  3.8   |  34<H>  |  0.77    Ca    9.3      15 Feb 2018 06:35            MICROBIOLOGY:  BLOOD  02-11-18 --  --  --      SPUTUM  02-10-18 --  --  Staph. aureus *MRSA*      URINE MIDSTREAM  02-09-18 --  --  --      BLOOD PERIPHERAL  02-09-18 --  --  --      BLOOD  02-09-18 --  --  BLOOD CULTURE PCR  Enterococcus faecalis VRE    RADIOLOGY

## 2018-02-16 NOTE — PROGRESS NOTE ADULT - ATTENDING COMMENTS
Sarcoidosis, complicated pulmonary history s/p trach on mechanical ventilation.  Acute on chronic respiratory failure with VRE bacteremia of unclear etiology and MRSA tracheobronchitis.   Switch to Unasyn for VRE bacteremia for total of 2 weeks. Plan for PICC.   Continue Bactrim for MRSA tracheitis.  Continue empiric PO vancomycin in setting of prior C. Diff.  CPAP and trach collar trial as tolerated.

## 2018-02-16 NOTE — PROGRESS NOTE ADULT - PROBLEM SELECTOR PLAN 1
-ampicillin 2 gm iv q4  -repeat bcx -ve  -check TTE  -CT abd/pelvis -ve  -UCx -ve  -?lung source seems unusual  -plan 2 weeks IV abx from 2/11  -check TTE  -can change abx to unasyn 3 gm iv q6 dosing for convenience instead of Q4 with   ampicillin  -PICC  -surveillance bcx post IV abx

## 2018-02-16 NOTE — PROGRESS NOTE ADULT - PROBLEM SELECTOR PLAN 1
- now resolved  - due to VRE bacteremia  - now afebrile  - leukocytosis resolved  - cont bactrim x 5 days total, thru 2/17  - cont ampicillin x 2 weeks (thru 2/25), can change to unasyn upon discharge per ID  - ID note appreciated  - RVP negative  - legionella negative  - blood culture cleared since 2/11  - JONATAN done in MICU, negative  - urine culture negative, Abd/pelvic CT negative for source of bacteremia - now resolved  - due to VRE bacteremia  - now afebrile  - leukocytosis resolved  - cont bactrim x 5 days total, thru 2/17  - cont ampicillin x 2 weeks (thru 2/25), can change to unasyn upon discharge per ID  - will place order for PICC  - ID note appreciated  - RVP negative  - legionella negative  - blood culture cleared since 2/11  - JONATAN done in MICU, negative  - urine culture negative, CT A/P negative for source of bacteremia

## 2018-02-16 NOTE — PROGRESS NOTE ADULT - SUBJECTIVE AND OBJECTIVE BOX
CHIEF COMPLAINT: Patient is a 47y old  Male who presents with a chief complaint of acute on chronic respiratory distress (09 Feb 2018 19:01)    Interval Events:      REVIEW OF SYSTEMS:  Constitutional:   Eyes:  ENT:  CV:  Resp:  GI:  :  MSK:  Integumentary:  Neurological:  Psychiatric:  Endocrine:  Hematologic/Lymphatic:  Allergic/Immunologic:  [ ] All other systems negative  [ ] Unable to assess ROS because ________      OBJECTIVE:  ICU Vital Signs Last 24 Hrs  T(C): 36.7 (16 Feb 2018 06:24), Max: 37.1 (15 Feb 2018 10:04)  T(F): 98.1 (16 Feb 2018 06:24), Max: 98.8 (15 Feb 2018 10:04)  HR: 93 (16 Feb 2018 06:55) (84 - 100)  BP: 99/71 (16 Feb 2018 06:24) (99/71 - 127/82)  BP(mean): --  ABP: --  ABP(mean): --  RR: 18 (16 Feb 2018 06:24) (16 - 18)  SpO2: 100% (16 Feb 2018 06:55) (99% - 100%)    Mode: AC/ CMV (Assist Control/ Continuous Mandatory Ventilation), RR (machine): 16, TV (machine): 500, FiO2: 40, PEEP: 5, ITime: 1, MAP: 13, PIP: 32    02-15 @ 07:01  -  02-16 @ 07:00  --------------------------------------------------------  IN: 0 mL / OUT: 250 mL / NET: -250 mL      HOSPITAL MEDICATIONS:  MEDICATIONS  (STANDING):  ALBUTerol    90 MICROgram(s) HFA Inhaler 2 Puff(s) Inhalation every 6 hours  ampicillin  IVPB 2 Gram(s) IV Intermittent every 4 hours  buDESOnide 160 MICROgram(s)/formoterol 4.5 MICROgram(s) Inhaler 2 Puff(s) Inhalation two times a day  chlorhexidine 4% Liquid 1 Application(s) Topical daily  docusate sodium Liquid 10 milliGRAM(s) Oral daily  enoxaparin Injectable 40 milliGRAM(s) SubCutaneous daily  folic acid 1 milliGRAM(s) Oral daily  ipratropium 17 MICROgram(s) HFA Inhaler 2 Puff(s) Inhalation every 6 hours  lactobacillus acidophilus 1 Tablet(s) Oral four times a day with meals  methylPREDNISolone sodium succinate Injectable 30 milliGRAM(s) IV Push daily  pantoprazole  Injectable 40 milliGRAM(s) IV Push daily  senna Syrup 10 milliLiter(s) Oral at bedtime  trimethoprim  40 mG/sulfamethoxazole 200 mG Suspension 160 milliGRAM(s) Oral every 12 hours  vancomycin    Solution 125 milliGRAM(s) Oral two times a day    MEDICATIONS  (PRN):  acetaminophen    Suspension 650 milliGRAM(s) Oral every 6 hours PRN For Temp greater than 38 C (100.4 F)  acetaminophen    Suspension 650 milliGRAM(s) Oral every 6 hours PRN mild and moderate and severe pain  ALBUTerol    90 MICROgram(s) HFA Inhaler 2 Puff(s) Inhalation every 6 hours PRN Bronchospasm  calamine Lotion 1 Application(s) Topical every 4 hours PRN Rash and/or Itching  LORazepam     Tablet 1 milliGRAM(s) Oral three times a day PRN Anxiety  oxyCODONE    IR 10 milliGRAM(s) Oral every 8 hours PRN Moderate Pain (4 - 6) CHIEF COMPLAINT: Patient is a 47y old  Male who presents with a chief complaint of acute on chronic respiratory distress (09 Feb 2018 19:01)    Interval Events: none overnight      REVIEW OF SYSTEMS:  Constitutional: no complaints, wants to try trach collar  CV: denies  Resp: denies  GI: denies  [x] All other systems negative  [ ] Unable to assess ROS because ________      OBJECTIVE:  ICU Vital Signs Last 24 Hrs  T(C): 36.7 (16 Feb 2018 06:24), Max: 37.1 (15 Feb 2018 10:04)  T(F): 98.1 (16 Feb 2018 06:24), Max: 98.8 (15 Feb 2018 10:04)  HR: 93 (16 Feb 2018 06:55) (84 - 100)  BP: 99/71 (16 Feb 2018 06:24) (99/71 - 127/82)  BP(mean): --  ABP: --  ABP(mean): --  RR: 18 (16 Feb 2018 06:24) (16 - 18)  SpO2: 100% (16 Feb 2018 06:55) (99% - 100%)    Mode: AC/ CMV (Assist Control/ Continuous Mandatory Ventilation), RR (machine): 16, TV (machine): 500, FiO2: 40, PEEP: 5, ITime: 1, MAP: 13, PIP: 32    02-15 @ 07:01  -  02-16 @ 07:00  --------------------------------------------------------  IN: 0 mL / OUT: 250 mL / NET: -250 mL      HOSPITAL MEDICATIONS:  MEDICATIONS  (STANDING):  ALBUTerol    90 MICROgram(s) HFA Inhaler 2 Puff(s) Inhalation every 6 hours  ampicillin  IVPB 2 Gram(s) IV Intermittent every 4 hours  buDESOnide 160 MICROgram(s)/formoterol 4.5 MICROgram(s) Inhaler 2 Puff(s) Inhalation two times a day  chlorhexidine 4% Liquid 1 Application(s) Topical daily  docusate sodium Liquid 10 milliGRAM(s) Oral daily  enoxaparin Injectable 40 milliGRAM(s) SubCutaneous daily  folic acid 1 milliGRAM(s) Oral daily  ipratropium 17 MICROgram(s) HFA Inhaler 2 Puff(s) Inhalation every 6 hours  lactobacillus acidophilus 1 Tablet(s) Oral four times a day with meals  methylPREDNISolone sodium succinate Injectable 30 milliGRAM(s) IV Push daily  pantoprazole  Injectable 40 milliGRAM(s) IV Push daily  senna Syrup 10 milliLiter(s) Oral at bedtime  trimethoprim  40 mG/sulfamethoxazole 200 mG Suspension 160 milliGRAM(s) Oral every 12 hours  vancomycin    Solution 125 milliGRAM(s) Oral two times a day    MEDICATIONS  (PRN):  acetaminophen    Suspension 650 milliGRAM(s) Oral every 6 hours PRN For Temp greater than 38 C (100.4 F)  acetaminophen    Suspension 650 milliGRAM(s) Oral every 6 hours PRN mild and moderate and severe pain  ALBUTerol    90 MICROgram(s) HFA Inhaler 2 Puff(s) Inhalation every 6 hours PRN Bronchospasm  calamine Lotion 1 Application(s) Topical every 4 hours PRN Rash and/or Itching  LORazepam     Tablet 1 milliGRAM(s) Oral three times a day PRN Anxiety  oxyCODONE    IR 10 milliGRAM(s) Oral every 8 hours PRN Moderate Pain (4 - 6)

## 2018-02-16 NOTE — PROGRESS NOTE ADULT - PROBLEM SELECTOR PLAN 2
- now improved s/p trach changed to #8 XLT  - tolerating vent settings and longer periods of CPAP  - chest PT  - trach care daily  - suction prn - now improved s/p trach changed to #8 XLT  - tolerating vent settings and longer periods of CPAP  - cont to wean as tolerated  - chest PT  - trach care daily  - suction prn

## 2018-02-17 PROCEDURE — 99233 SBSQ HOSP IP/OBS HIGH 50: CPT | Mod: GC

## 2018-02-17 RX ORDER — SIMETHICONE 80 MG/1
80 TABLET, CHEWABLE ORAL
Qty: 0 | Refills: 0 | Status: DISCONTINUED | OUTPATIENT
Start: 2018-02-17 | End: 2018-02-19

## 2018-02-17 RX ADMIN — Medication 2 PUFF(S): at 21:08

## 2018-02-17 RX ADMIN — Medication 125 MILLIGRAM(S): at 17:05

## 2018-02-17 RX ADMIN — Medication 2 PUFF(S): at 15:22

## 2018-02-17 RX ADMIN — Medication 30 MILLIGRAM(S): at 05:38

## 2018-02-17 RX ADMIN — Medication 125 MILLIGRAM(S): at 05:38

## 2018-02-17 RX ADMIN — Medication 1 MILLIGRAM(S): at 12:34

## 2018-02-17 RX ADMIN — BUDESONIDE AND FORMOTEROL FUMARATE DIHYDRATE 2 PUFF(S): 160; 4.5 AEROSOL RESPIRATORY (INHALATION) at 08:50

## 2018-02-17 RX ADMIN — Medication 160 MILLIGRAM(S): at 05:38

## 2018-02-17 RX ADMIN — Medication 1 TABLET(S): at 21:01

## 2018-02-17 RX ADMIN — Medication 2 PUFF(S): at 04:45

## 2018-02-17 RX ADMIN — Medication 216 GRAM(S): at 08:53

## 2018-02-17 RX ADMIN — Medication 216 GRAM(S): at 04:46

## 2018-02-17 RX ADMIN — PANTOPRAZOLE SODIUM 40 MILLIGRAM(S): 20 TABLET, DELAYED RELEASE ORAL at 12:45

## 2018-02-17 RX ADMIN — Medication 2 PUFF(S): at 08:50

## 2018-02-17 RX ADMIN — Medication 1 TABLET(S): at 17:05

## 2018-02-17 RX ADMIN — Medication 216 GRAM(S): at 20:40

## 2018-02-17 RX ADMIN — Medication 216 GRAM(S): at 16:21

## 2018-02-17 RX ADMIN — ENOXAPARIN SODIUM 40 MILLIGRAM(S): 100 INJECTION SUBCUTANEOUS at 12:34

## 2018-02-17 RX ADMIN — BUDESONIDE AND FORMOTEROL FUMARATE DIHYDRATE 2 PUFF(S): 160; 4.5 AEROSOL RESPIRATORY (INHALATION) at 21:08

## 2018-02-17 RX ADMIN — Medication 216 GRAM(S): at 12:34

## 2018-02-17 RX ADMIN — Medication 1 TABLET(S): at 08:53

## 2018-02-17 RX ADMIN — CHLORHEXIDINE GLUCONATE 1 APPLICATION(S): 213 SOLUTION TOPICAL at 12:34

## 2018-02-17 RX ADMIN — Medication 1 TABLET(S): at 12:34

## 2018-02-17 RX ADMIN — SIMETHICONE 80 MILLIGRAM(S): 80 TABLET, CHEWABLE ORAL at 21:00

## 2018-02-17 RX ADMIN — Medication 1 MILLIGRAM(S): at 21:00

## 2018-02-17 NOTE — PROGRESS NOTE ADULT - PROBLEM SELECTOR PLAN 1
- now resolved  - due to VRE bacteremia  - now afebrile  - leukocytosis resolved  - cont bactrim x 5 days total, thru 2/17  - cont ampicillin x 2 weeks (thru 2/25), can change to unasyn upon discharge per ID  - PICC pending  - ID note appreciated  - RVP negative  - legionella negative  - blood culture cleared since 2/11  - JONATAN done in MICU, negative  - urine culture negative, CT A/P negative for source of bacteremia

## 2018-02-17 NOTE — PROGRESS NOTE ADULT - ATTENDING COMMENTS
46 yo M with Sarcoidosis, s/p emergent left upper lobectomy for uncontrolled hemoptysis, respiratory failure s/p trach on mechanical ventilation.  Acute on chronic respiratory failure with VRE bacteremia of unclear etiology and MRSA tracheobronchitis.   Switch to Unasyn for VRE bacteremia for total of 2 weeks. Plan for PICC this week  Appreciate ID follow up.  Continue Bactrim for MRSA tracheitis.   Continue empiric PO vancomycin in setting of prior C. Diff.  CPAP and trach collar trial as tolerated- currently tolerating PS 8/5 - will trial TC this afternoon.  Out of bed to chair, trach care, chest PT

## 2018-02-17 NOTE — PROGRESS NOTE ADULT - PROBLEM SELECTOR PLAN 2
- now improved s/p trach changed to #8 XLT  - tolerating vent settings and longer periods of CPAP  - cont to wean as tolerated  - chest PT  - trach care daily  - suction prn

## 2018-02-17 NOTE — PROGRESS NOTE ADULT - SUBJECTIVE AND OBJECTIVE BOX
CHIEF COMPLAINT: Patient is a 47y old  Male who presents with a chief complaint of acute on chronic respiratory distress (09 Feb 2018 19:01)    Interval Events:      REVIEW OF SYSTEMS:  Constitutional:   Eyes:  ENT:  CV:  Resp:  GI:  :  MSK:  Integumentary:  Neurological:  Psychiatric:  Endocrine:  Hematologic/Lymphatic:  Allergic/Immunologic:  [ ] All other systems negative  [ ] Unable to assess ROS because ________      OBJECTIVE:  ICU Vital Signs Last 24 Hrs  T(C): 36.7 (17 Feb 2018 04:55), Max: 37.1 (16 Feb 2018 17:08)  T(F): 98 (17 Feb 2018 04:55), Max: 98.8 (16 Feb 2018 17:08)  HR: 95 (17 Feb 2018 06:59) (84 - 97)  BP: 108/73 (17 Feb 2018 04:55) (101/77 - 115/86)  BP(mean): 86 (16 Feb 2018 10:45) (86 - 86)  ABP: --  ABP(mean): --  RR: 20 (17 Feb 2018 04:55) (16 - 20)  SpO2: 99% (17 Feb 2018 06:59) (99% - 100%)    Mode: AC/ CMV (Assist Control/ Continuous Mandatory Ventilation), RR (machine): 16, TV (machine): 500, FiO2: 40, PEEP: 5, MAP: 12, PIP: 32    02-16 @ 07:01  -  02-17 @ 07:00  --------------------------------------------------------  IN: 170 mL / OUT: 500 mL / NET: -330 mL      HOSPITAL MEDICATIONS:  MEDICATIONS  (STANDING):  ALBUTerol    90 MICROgram(s) HFA Inhaler 2 Puff(s) Inhalation every 6 hours  ampicillin  IVPB 2 Gram(s) IV Intermittent every 4 hours  buDESOnide 160 MICROgram(s)/formoterol 4.5 MICROgram(s) Inhaler 2 Puff(s) Inhalation two times a day  chlorhexidine 4% Liquid 1 Application(s) Topical daily  docusate sodium Liquid 10 milliGRAM(s) Oral daily  enoxaparin Injectable 40 milliGRAM(s) SubCutaneous daily  folic acid 1 milliGRAM(s) Oral daily  ipratropium 17 MICROgram(s) HFA Inhaler 2 Puff(s) Inhalation every 6 hours  lactobacillus acidophilus 1 Tablet(s) Oral four times a day with meals  methylPREDNISolone sodium succinate Injectable 30 milliGRAM(s) IV Push daily  pantoprazole  Injectable 40 milliGRAM(s) IV Push daily  senna Syrup 10 milliLiter(s) Oral at bedtime  vancomycin    Solution 125 milliGRAM(s) Oral two times a day    MEDICATIONS  (PRN):  acetaminophen    Suspension 650 milliGRAM(s) Oral every 6 hours PRN For Temp greater than 38 C (100.4 F)  acetaminophen    Suspension 650 milliGRAM(s) Oral every 6 hours PRN mild and moderate and severe pain  ALBUTerol    90 MICROgram(s) HFA Inhaler 2 Puff(s) Inhalation every 6 hours PRN Bronchospasm  calamine Lotion 1 Application(s) Topical every 4 hours PRN Rash and/or Itching  LORazepam     Tablet 1 milliGRAM(s) Oral three times a day PRN Anxiety  oxyCODONE    IR 10 milliGRAM(s) Oral every 8 hours PRN Moderate Pain (4 - 6)      LABS:                    MICROBIOLOGY:     RADIOLOGY:  [ ] Reviewed and interpreted by me    PULMONARY FUNCTION TESTS:    EKG: CHIEF COMPLAINT: Patient is a 47y old  Male who presents with a chief complaint of acute on chronic respiratory distress (09 Feb 2018 19:01)    Interval Events: none overnight      REVIEW OF SYSTEMS:  Constitutional: feels ok, no complaints  CV: denies  Resp: denies  GI: denies  [x] All other systems negative  [ ] Unable to assess ROS because ________      OBJECTIVE:  ICU Vital Signs Last 24 Hrs  T(C): 36.7 (17 Feb 2018 04:55), Max: 37.1 (16 Feb 2018 17:08)  T(F): 98 (17 Feb 2018 04:55), Max: 98.8 (16 Feb 2018 17:08)  HR: 95 (17 Feb 2018 06:59) (84 - 97)  BP: 108/73 (17 Feb 2018 04:55) (101/77 - 115/86)  BP(mean): 86 (16 Feb 2018 10:45) (86 - 86)  ABP: --  ABP(mean): --  RR: 20 (17 Feb 2018 04:55) (16 - 20)  SpO2: 99% (17 Feb 2018 06:59) (99% - 100%)    Mode: AC/ CMV (Assist Control/ Continuous Mandatory Ventilation), RR (machine): 16, TV (machine): 500, FiO2: 40, PEEP: 5, MAP: 12, PIP: 32    02-16 @ 07:01  -  02-17 @ 07:00  --------------------------------------------------------  IN: 170 mL / OUT: 500 mL / NET: -330 mL      HOSPITAL MEDICATIONS:  MEDICATIONS  (STANDING):  ALBUTerol    90 MICROgram(s) HFA Inhaler 2 Puff(s) Inhalation every 6 hours  ampicillin  IVPB 2 Gram(s) IV Intermittent every 4 hours  buDESOnide 160 MICROgram(s)/formoterol 4.5 MICROgram(s) Inhaler 2 Puff(s) Inhalation two times a day  chlorhexidine 4% Liquid 1 Application(s) Topical daily  docusate sodium Liquid 10 milliGRAM(s) Oral daily  enoxaparin Injectable 40 milliGRAM(s) SubCutaneous daily  folic acid 1 milliGRAM(s) Oral daily  ipratropium 17 MICROgram(s) HFA Inhaler 2 Puff(s) Inhalation every 6 hours  lactobacillus acidophilus 1 Tablet(s) Oral four times a day with meals  methylPREDNISolone sodium succinate Injectable 30 milliGRAM(s) IV Push daily  pantoprazole  Injectable 40 milliGRAM(s) IV Push daily  senna Syrup 10 milliLiter(s) Oral at bedtime  vancomycin    Solution 125 milliGRAM(s) Oral two times a day    MEDICATIONS  (PRN):  acetaminophen    Suspension 650 milliGRAM(s) Oral every 6 hours PRN For Temp greater than 38 C (100.4 F)  acetaminophen    Suspension 650 milliGRAM(s) Oral every 6 hours PRN mild and moderate and severe pain  ALBUTerol    90 MICROgram(s) HFA Inhaler 2 Puff(s) Inhalation every 6 hours PRN Bronchospasm  calamine Lotion 1 Application(s) Topical every 4 hours PRN Rash and/or Itching  LORazepam     Tablet 1 milliGRAM(s) Oral three times a day PRN Anxiety  oxyCODONE    IR 10 milliGRAM(s) Oral every 8 hours PRN Moderate Pain (4 - 6)

## 2018-02-17 NOTE — PROGRESS NOTE ADULT - SUBJECTIVE AND OBJECTIVE BOX
Follow Up:      Interval History: no acute events oervnight, the pt is being followed up for Enterococcus bacteremia and c diff.     ROS:    Unobtainable becasue:  All other systems negative    fever [ ]     chills [ ]      headache  [ ]       chest pain [ ]           SOB  [ ]          abd pain  [ ]            nausea [ ]            vomiting [ ]          diarrhea [ ]  dysurea [ ]         rash  [ ]         joint swelling [ ]           edema  [ ]  x cough     Allergies  No Known Allergies        ANTIMICROBIALS:  ampicillin  IVPB 2 every 4 hours  vancomycin    Solution 125 two times a day      OTHER MEDS:  acetaminophen    Suspension 650 milliGRAM(s) Oral every 6 hours PRN  acetaminophen    Suspension 650 milliGRAM(s) Oral every 6 hours PRN  ALBUTerol    90 MICROgram(s) HFA Inhaler 2 Puff(s) Inhalation every 6 hours  ALBUTerol    90 MICROgram(s) HFA Inhaler 2 Puff(s) Inhalation every 6 hours PRN  buDESOnide 160 MICROgram(s)/formoterol 4.5 MICROgram(s) Inhaler 2 Puff(s) Inhalation two times a day  calamine Lotion 1 Application(s) Topical every 4 hours PRN  chlorhexidine 4% Liquid 1 Application(s) Topical daily  docusate sodium Liquid 10 milliGRAM(s) Oral daily  enoxaparin Injectable 40 milliGRAM(s) SubCutaneous daily  folic acid 1 milliGRAM(s) Oral daily  ipratropium 17 MICROgram(s) HFA Inhaler 2 Puff(s) Inhalation every 6 hours  lactobacillus acidophilus 1 Tablet(s) Oral four times a day with meals  LORazepam     Tablet 1 milliGRAM(s) Oral three times a day PRN  methylPREDNISolone sodium succinate Injectable 30 milliGRAM(s) IV Push daily  oxyCODONE    IR 10 milliGRAM(s) Oral every 8 hours PRN  pantoprazole  Injectable 40 milliGRAM(s) IV Push daily  senna Syrup 10 milliLiter(s) Oral at bedtime      Vital Signs Last 24 Hrs  T(C): 36.8 (17 Feb 2018 08:58), Max: 37.1 (16 Feb 2018 17:08)  T(F): 98.2 (17 Feb 2018 08:58), Max: 98.8 (16 Feb 2018 17:08)  HR: 59 (17 Feb 2018 09:00) (59 - 97)  BP: 107/78 (17 Feb 2018 08:58) (101/77 - 115/86)  BP(mean): 86 (16 Feb 2018 10:45) (86 - 86)  RR: 16 (17 Feb 2018 08:58) (16 - 20)  SpO2: 100% (17 Feb 2018 08:58) (99% - 100%)     Physical Exam:   · Constitutional	detailed exam  · Constitutional Details	no distress  · Eyes	detailed exam  · Eyes Details	EOMI; conjunctiva clear  · ENMT	detailed exam  · ENMT Details	mouth  · Mouth	normal mouth and gums  · Neck	detailed exam  · Neck Details	trach+  · Respiratory	detailed exam  · Respiratory Details	breath sounds equal; good air movement; respirations non-labored; clear to auscultation bilaterally  · Cardiovascular	detailed exam  · Cardiovascular Details	positive S1; positive S2  · Gastrointestinal	detailed exam  · GI Normal	soft; nontender; no distention; no rebound tenderness  · Genitourinary	detailed exam  · Genitourinary Details Male	Pick[place]+  · Extremities	detailed exam  · Extremities Details	no pedal edema  · Neurological	detailed exam  · Neurological Details	alert and oriented x 3; responds to verbal commands  · Skin	detailed exam  · Skin Details	warm and dry  · Psychiatric	detailed exam  · Psychiatric Details	normal affect        MICROBIOLOGY:  SPUTUM  02-10-18 --  --  Staph. aureus *MRSA*           BLOOD PERIPHERAL  02-09-18 --  --  --      BLOOD  02-09-18 --  --  BLOOD CULTURE PCR  Enterococcus faecalis VRE        RADIOLOGY:  ct abdo- as noted above,   chest xray- IMPRESSION: Diffuse bilateral emphysematous changes with large bulla formations,   grossly unchanged.

## 2018-02-18 LAB
BUN SERPL-MCNC: 16 MG/DL — SIGNIFICANT CHANGE UP (ref 7–23)
CALCIUM SERPL-MCNC: 9.1 MG/DL — SIGNIFICANT CHANGE UP (ref 8.4–10.5)
CHLORIDE SERPL-SCNC: 96 MMOL/L — LOW (ref 98–107)
CO2 SERPL-SCNC: 33 MMOL/L — HIGH (ref 22–31)
CREAT SERPL-MCNC: 0.93 MG/DL — SIGNIFICANT CHANGE UP (ref 0.5–1.3)
GLUCOSE SERPL-MCNC: 122 MG/DL — HIGH (ref 70–99)
HCT VFR BLD CALC: 27.9 % — LOW (ref 39–50)
HGB BLD-MCNC: 8.9 G/DL — LOW (ref 13–17)
MCHC RBC-ENTMCNC: 29 PG — SIGNIFICANT CHANGE UP (ref 27–34)
MCHC RBC-ENTMCNC: 31.9 % — LOW (ref 32–36)
MCV RBC AUTO: 90.9 FL — SIGNIFICANT CHANGE UP (ref 80–100)
NRBC # FLD: 0 — SIGNIFICANT CHANGE UP
PLATELET # BLD AUTO: 251 K/UL — SIGNIFICANT CHANGE UP (ref 150–400)
PMV BLD: 12.2 FL — SIGNIFICANT CHANGE UP (ref 7–13)
POTASSIUM SERPL-MCNC: 4.6 MMOL/L — SIGNIFICANT CHANGE UP (ref 3.5–5.3)
POTASSIUM SERPL-SCNC: 4.6 MMOL/L — SIGNIFICANT CHANGE UP (ref 3.5–5.3)
RBC # BLD: 3.07 M/UL — LOW (ref 4.2–5.8)
RBC # FLD: 14.8 % — HIGH (ref 10.3–14.5)
SODIUM SERPL-SCNC: 139 MMOL/L — SIGNIFICANT CHANGE UP (ref 135–145)
WBC # BLD: 10.06 K/UL — SIGNIFICANT CHANGE UP (ref 3.8–10.5)
WBC # FLD AUTO: 10.06 K/UL — SIGNIFICANT CHANGE UP (ref 3.8–10.5)

## 2018-02-18 PROCEDURE — 99233 SBSQ HOSP IP/OBS HIGH 50: CPT | Mod: GC

## 2018-02-18 RX ADMIN — Medication 2 PUFF(S): at 16:21

## 2018-02-18 RX ADMIN — ENOXAPARIN SODIUM 40 MILLIGRAM(S): 100 INJECTION SUBCUTANEOUS at 11:52

## 2018-02-18 RX ADMIN — BUDESONIDE AND FORMOTEROL FUMARATE DIHYDRATE 2 PUFF(S): 160; 4.5 AEROSOL RESPIRATORY (INHALATION) at 10:06

## 2018-02-18 RX ADMIN — Medication 216 GRAM(S): at 00:43

## 2018-02-18 RX ADMIN — CHLORHEXIDINE GLUCONATE 1 APPLICATION(S): 213 SOLUTION TOPICAL at 11:48

## 2018-02-18 RX ADMIN — Medication 1 TABLET(S): at 17:31

## 2018-02-18 RX ADMIN — Medication 216 GRAM(S): at 08:07

## 2018-02-18 RX ADMIN — Medication 1 TABLET(S): at 08:07

## 2018-02-18 RX ADMIN — Medication 216 GRAM(S): at 15:55

## 2018-02-18 RX ADMIN — Medication 2 PUFF(S): at 21:40

## 2018-02-18 RX ADMIN — Medication 216 GRAM(S): at 11:52

## 2018-02-18 RX ADMIN — Medication 2 PUFF(S): at 10:06

## 2018-02-18 RX ADMIN — Medication 30 MILLIGRAM(S): at 05:12

## 2018-02-18 RX ADMIN — Medication 216 GRAM(S): at 05:00

## 2018-02-18 RX ADMIN — Medication 125 MILLIGRAM(S): at 17:31

## 2018-02-18 RX ADMIN — Medication 2 PUFF(S): at 04:22

## 2018-02-18 RX ADMIN — BUDESONIDE AND FORMOTEROL FUMARATE DIHYDRATE 2 PUFF(S): 160; 4.5 AEROSOL RESPIRATORY (INHALATION) at 21:40

## 2018-02-18 RX ADMIN — FENTANYL CITRATE 1 PATCH: 50 INJECTION INTRAVENOUS at 21:22

## 2018-02-18 RX ADMIN — Medication 1 TABLET(S): at 11:52

## 2018-02-18 RX ADMIN — Medication 125 MILLIGRAM(S): at 05:12

## 2018-02-18 RX ADMIN — Medication 1 MILLIGRAM(S): at 11:52

## 2018-02-18 RX ADMIN — Medication 216 GRAM(S): at 20:06

## 2018-02-18 RX ADMIN — Medication 1 TABLET(S): at 22:06

## 2018-02-18 RX ADMIN — PANTOPRAZOLE SODIUM 40 MILLIGRAM(S): 20 TABLET, DELAYED RELEASE ORAL at 11:52

## 2018-02-18 NOTE — PROGRESS NOTE ADULT - PROBLEM SELECTOR PLAN 1
VRE bacteremia - Ampicillin / Vanco continued  - now afebrile  - leukocytosis resolved  - cont bactrim x 5 days total, thru 2/17  - cont ampicillin x 2 weeks (thru 2/25), can change to unasyn upon discharge per ID  - PICC pending  - ID note appreciated  - RVP negative  - legionella negative  - blood culture cleared since 2/11  - JONATAN done in MICU, negative  - urine culture negative, CT A/P negative for source of bacteremia

## 2018-02-18 NOTE — PROGRESS NOTE ADULT - SUBJECTIVE AND OBJECTIVE BOX
CHIEF COMPLAINT:  Patient is a 47y old  Male who presents with a chief complaint of Mode: AC/ CMV (Assist Control/ Continuous Mandatory Ventilation), RR (machine): 16, TV (machine): 50, FiO2: 40, PEEP: 5, MAP: 10, PIP: 23  Interval Events:    REVIEW OF SYSTEMS:  Constitutional:   Eyes:  ENT:  CV:  Resp:  GI:  :  MSK:  Integumentary:  Neurological:  Psychiatric:  Endocrine:  Hematologic/Lymphatic:  Allergic/Immunologic:  [ ] All other systems negative  [ ] Unable to assess ROS because ________    OBJECTIVE:  ICU Vital Signs Last 24 Hrs  T(C): 36.4 (18 Feb 2018 05:10), Max: 36.8 (17 Feb 2018 08:58)  T(F): 97.6 (18 Feb 2018 05:10), Max: 98.2 (17 Feb 2018 08:58)  HR: 99 (18 Feb 2018 06:59) (59 - 106)  BP: 94/70 (18 Feb 2018 05:10) (94/70 - 109/82)  BP(mean): --  ABP: --  ABP(mean): --  RR: 16 (18 Feb 2018 05:10) (16 - 18)  SpO2: 100% (18 Feb 2018 06:59) (98% - 100%)    Mode: AC/ CMV (Assist Control/ Continuous Mandatory Ventilation), RR (machine): 16, TV (machine): 50, FiO2: 40, PEEP: 5, MAP: 10, PIP: 23    02-17 @ 07:01  -  02-18 @ 07:00  --------------------------------------------------------  IN: 0 mL / OUT: 2700 mL / NET: -2700 mL      CAPILLARY BLOOD GLUCOSE          PHYSICAL EXAM:  General:   HEENT:   Lymph Nodes:  Neck:   Respiratory:   Cardiovascular:   Abdomen:   Extremities:   Skin:   Neurological:  Psychiatry:    HOSPITAL MEDICATIONS:  MEDICATIONS  (STANDING):  ALBUTerol    90 MICROgram(s) HFA Inhaler 2 Puff(s) Inhalation every 6 hours  ampicillin  IVPB 2 Gram(s) IV Intermittent every 4 hours  buDESOnide 160 MICROgram(s)/formoterol 4.5 MICROgram(s) Inhaler 2 Puff(s) Inhalation two times a day  chlorhexidine 4% Liquid 1 Application(s) Topical daily  docusate sodium Liquid 10 milliGRAM(s) Oral daily  enoxaparin Injectable 40 milliGRAM(s) SubCutaneous daily  folic acid 1 milliGRAM(s) Oral daily  ipratropium 17 MICROgram(s) HFA Inhaler 2 Puff(s) Inhalation every 6 hours  lactobacillus acidophilus 1 Tablet(s) Oral four times a day with meals  methylPREDNISolone sodium succinate Injectable 30 milliGRAM(s) IV Push daily  pantoprazole  Injectable 40 milliGRAM(s) IV Push daily  vancomycin    Solution 125 milliGRAM(s) Oral two times a day    MEDICATIONS  (PRN):  acetaminophen    Suspension 650 milliGRAM(s) Oral every 6 hours PRN For Temp greater than 38 C (100.4 F)  acetaminophen    Suspension 650 milliGRAM(s) Oral every 6 hours PRN mild and moderate and severe pain  ALBUTerol    90 MICROgram(s) HFA Inhaler 2 Puff(s) Inhalation every 6 hours PRN Bronchospasm  calamine Lotion 1 Application(s) Topical every 4 hours PRN Rash and/or Itching  LORazepam     Tablet 1 milliGRAM(s) Oral three times a day PRN Anxiety  oxyCODONE    IR 10 milliGRAM(s) Oral every 8 hours PRN Moderate Pain (4 - 6)  simethicone 80 milliGRAM(s) Chew four times a day PRN Gas      LABS:                    MICROBIOLOGY:     RADIOLOGY:  [ ] Reviewed and interpreted by me    PULMONARY FUNCTION TESTS:    EKG:    I&O's Summary    17 Feb 2018 07:01  -  18 Feb 2018 07:00  --------------------------------------------------------  IN: 0 mL / OUT: 2700 mL / NET: -2700 mL CHIEF COMPLAINT:  Patient is a 47y old  Male who presents with a chief complaint of Resp distress    Mode: AC/ CMV (Assist Control/ Continuous Mandatory Ventilation), RR (machine): 16, TV (machine): 50, FiO2: 40, PEEP: 5, MAP: 10, PIP: 23  Interval Events:    REVIEW OF SYSTEMS:  Constitutional: No C/O  CV: Denies  Resp: Denies  GI: Denies  [ X ] All other systems negative    OBJECTIVE:  ICU Vital Signs Last 24 Hrs  T(C): 36.4 (18 Feb 2018 05:10), Max: 36.8 (17 Feb 2018 08:58)  T(F): 97.6 (18 Feb 2018 05:10), Max: 98.2 (17 Feb 2018 08:58)  HR: 99 (18 Feb 2018 06:59) (59 - 106)  BP: 94/70 (18 Feb 2018 05:10) (94/70 - 109/82)  BP(mean): --  ABP: --  ABP(mean): --  RR: 16 (18 Feb 2018 05:10) (16 - 18)  SpO2: 100% (18 Feb 2018 06:59) (98% - 100%)    Mode: AC/ CMV (Assist Control/ Continuous Mandatory Ventilation), RR (machine): 16, TV (machine): 50, FiO2: 40, PEEP: 5, MAP: 10, PIP: 23    02-17 @ 07:01  -  02-18 @ 07:00  --------------------------------------------------------  IN: 0 mL / OUT: 2700 mL / NET: -2700 mL      CAPILLARY BLOOD GLUCOSE    HOSPITAL MEDICATIONS:  MEDICATIONS  (STANDING):  ALBUTerol    90 MICROgram(s) HFA Inhaler 2 Puff(s) Inhalation every 6 hours  ampicillin  IVPB 2 Gram(s) IV Intermittent every 4 hours  buDESOnide 160 MICROgram(s)/formoterol 4.5 MICROgram(s) Inhaler 2 Puff(s) Inhalation two times a day  chlorhexidine 4% Liquid 1 Application(s) Topical daily  docusate sodium Liquid 10 milliGRAM(s) Oral daily  enoxaparin Injectable 40 milliGRAM(s) SubCutaneous daily  folic acid 1 milliGRAM(s) Oral daily  ipratropium 17 MICROgram(s) HFA Inhaler 2 Puff(s) Inhalation every 6 hours  lactobacillus acidophilus 1 Tablet(s) Oral four times a day with meals  methylPREDNISolone sodium succinate Injectable 30 milliGRAM(s) IV Push daily  pantoprazole  Injectable 40 milliGRAM(s) IV Push daily  vancomycin    Solution 125 milliGRAM(s) Oral two times a day    MEDICATIONS  (PRN):  acetaminophen    Suspension 650 milliGRAM(s) Oral every 6 hours PRN For Temp greater than 38 C (100.4 F)  acetaminophen    Suspension 650 milliGRAM(s) Oral every 6 hours PRN mild and moderate and severe pain  ALBUTerol    90 MICROgram(s) HFA Inhaler 2 Puff(s) Inhalation every 6 hours PRN Bronchospasm  calamine Lotion 1 Application(s) Topical every 4 hours PRN Rash and/or Itching  LORazepam     Tablet 1 milliGRAM(s) Oral three times a day PRN Anxiety  oxyCODONE    IR 10 milliGRAM(s) Oral every 8 hours PRN Moderate Pain (4 - 6)  simethicone 80 milliGRAM(s) Chew four times a day PRN Gas      LABS:                    MICROBIOLOGY:     RADIOLOGY:  [ ] Reviewed and interpreted by me    PULMONARY FUNCTION TESTS:    EKG:    I&O's Summary    17 Feb 2018 07:01  -  18 Feb 2018 07:00  --------------------------------------------------------  IN: 0 mL / OUT: 2700 mL / NET: -2700 mL CHIEF COMPLAINT:  Patient is a 47y old  Male who presents with a chief complaint of Resp distress    Interval Events: No events overnight. Tolerated PS trial 8/5 for the entire day yesterday, rested on vent overnight.     REVIEW OF SYSTEMS:  Constitutional: No C/O  CV: Denies  Resp: Denies  GI: Denies  [ X ] All other systems negative    OBJECTIVE:  ICU Vital Signs Last 24 Hrs  T(C): 36.4 (18 Feb 2018 05:10), Max: 36.8 (17 Feb 2018 08:58)  T(F): 97.6 (18 Feb 2018 05:10), Max: 98.2 (17 Feb 2018 08:58)  HR: 99 (18 Feb 2018 06:59) (59 - 106)  BP: 94/70 (18 Feb 2018 05:10) (94/70 - 109/82)  BP(mean): --  ABP: --  ABP(mean): --  RR: 16 (18 Feb 2018 05:10) (16 - 18)  SpO2: 100% (18 Feb 2018 06:59) (98% - 100%)    Mode: AC/ CMV (Assist Control/ Continuous Mandatory Ventilation), RR (machine): 16, TV (machine): 50, FiO2: 40, PEEP: 5, MAP: 10, PIP: 23    02-17 @ 07:01  -  02-18 @ 07:00  --------------------------------------------------------  IN: 0 mL / OUT: 2700 mL / NET: -2700 mL      CAPILLARY BLOOD GLUCOSE    HOSPITAL MEDICATIONS:  MEDICATIONS  (STANDING):  ALBUTerol    90 MICROgram(s) HFA Inhaler 2 Puff(s) Inhalation every 6 hours  ampicillin  IVPB 2 Gram(s) IV Intermittent every 4 hours  buDESOnide 160 MICROgram(s)/formoterol 4.5 MICROgram(s) Inhaler 2 Puff(s) Inhalation two times a day  chlorhexidine 4% Liquid 1 Application(s) Topical daily  docusate sodium Liquid 10 milliGRAM(s) Oral daily  enoxaparin Injectable 40 milliGRAM(s) SubCutaneous daily  folic acid 1 milliGRAM(s) Oral daily  ipratropium 17 MICROgram(s) HFA Inhaler 2 Puff(s) Inhalation every 6 hours  lactobacillus acidophilus 1 Tablet(s) Oral four times a day with meals  methylPREDNISolone sodium succinate Injectable 30 milliGRAM(s) IV Push daily  pantoprazole  Injectable 40 milliGRAM(s) IV Push daily  vancomycin    Solution 125 milliGRAM(s) Oral two times a day    MEDICATIONS  (PRN):  acetaminophen    Suspension 650 milliGRAM(s) Oral every 6 hours PRN For Temp greater than 38 C (100.4 F)  acetaminophen    Suspension 650 milliGRAM(s) Oral every 6 hours PRN mild and moderate and severe pain  ALBUTerol    90 MICROgram(s) HFA Inhaler 2 Puff(s) Inhalation every 6 hours PRN Bronchospasm  calamine Lotion 1 Application(s) Topical every 4 hours PRN Rash and/or Itching  LORazepam     Tablet 1 milliGRAM(s) Oral three times a day PRN Anxiety  oxyCODONE    IR 10 milliGRAM(s) Oral every 8 hours PRN Moderate Pain (4 - 6)  simethicone 80 milliGRAM(s) Chew four times a day PRN Gas      LABS:                    MICROBIOLOGY:     RADIOLOGY:  [ ] Reviewed and interpreted by me    PULMONARY FUNCTION TESTS:    EKG:    I&O's Summary    17 Feb 2018 07:01  -  18 Feb 2018 07:00  --------------------------------------------------------  IN: 0 mL / OUT: 2700 mL / NET: -2700 mL

## 2018-02-19 LAB
BUN SERPL-MCNC: 20 MG/DL — SIGNIFICANT CHANGE UP (ref 7–23)
CALCIUM SERPL-MCNC: 10 MG/DL — SIGNIFICANT CHANGE UP (ref 8.4–10.5)
CHLORIDE SERPL-SCNC: 95 MMOL/L — LOW (ref 98–107)
CO2 SERPL-SCNC: 30 MMOL/L — SIGNIFICANT CHANGE UP (ref 22–31)
CREAT SERPL-MCNC: 0.99 MG/DL — SIGNIFICANT CHANGE UP (ref 0.5–1.3)
GLUCOSE SERPL-MCNC: 147 MG/DL — HIGH (ref 70–99)
HCT VFR BLD CALC: 30 % — LOW (ref 39–50)
HGB BLD-MCNC: 9.4 G/DL — LOW (ref 13–17)
MCHC RBC-ENTMCNC: 28.1 PG — SIGNIFICANT CHANGE UP (ref 27–34)
MCHC RBC-ENTMCNC: 31.3 % — LOW (ref 32–36)
MCV RBC AUTO: 89.8 FL — SIGNIFICANT CHANGE UP (ref 80–100)
NRBC # FLD: 0 — SIGNIFICANT CHANGE UP
PLATELET # BLD AUTO: 287 K/UL — SIGNIFICANT CHANGE UP (ref 150–400)
PMV BLD: 12.1 FL — SIGNIFICANT CHANGE UP (ref 7–13)
POTASSIUM SERPL-MCNC: 4.5 MMOL/L — SIGNIFICANT CHANGE UP (ref 3.5–5.3)
POTASSIUM SERPL-SCNC: 4.5 MMOL/L — SIGNIFICANT CHANGE UP (ref 3.5–5.3)
RBC # BLD: 3.34 M/UL — LOW (ref 4.2–5.8)
RBC # FLD: 14.9 % — HIGH (ref 10.3–14.5)
SODIUM SERPL-SCNC: 138 MMOL/L — SIGNIFICANT CHANGE UP (ref 135–145)
WBC # BLD: 10.49 K/UL — SIGNIFICANT CHANGE UP (ref 3.8–10.5)
WBC # FLD AUTO: 10.49 K/UL — SIGNIFICANT CHANGE UP (ref 3.8–10.5)

## 2018-02-19 PROCEDURE — 99233 SBSQ HOSP IP/OBS HIGH 50: CPT | Mod: GC

## 2018-02-19 RX ORDER — SIMETHICONE 80 MG/1
80 TABLET, CHEWABLE ORAL
Qty: 0 | Refills: 0 | Status: DISCONTINUED | OUTPATIENT
Start: 2018-02-19 | End: 2018-02-27

## 2018-02-19 RX ADMIN — Medication 30 MILLIGRAM(S): at 05:04

## 2018-02-19 RX ADMIN — Medication 1 TABLET(S): at 12:30

## 2018-02-19 RX ADMIN — Medication 650 MILLIGRAM(S): at 20:20

## 2018-02-19 RX ADMIN — CHLORHEXIDINE GLUCONATE 1 APPLICATION(S): 213 SOLUTION TOPICAL at 11:32

## 2018-02-19 RX ADMIN — Medication 2 PUFF(S): at 20:16

## 2018-02-19 RX ADMIN — ENOXAPARIN SODIUM 40 MILLIGRAM(S): 100 INJECTION SUBCUTANEOUS at 12:31

## 2018-02-19 RX ADMIN — Medication 125 MILLIGRAM(S): at 05:04

## 2018-02-19 RX ADMIN — Medication 2 PUFF(S): at 09:26

## 2018-02-19 RX ADMIN — BUDESONIDE AND FORMOTEROL FUMARATE DIHYDRATE 2 PUFF(S): 160; 4.5 AEROSOL RESPIRATORY (INHALATION) at 09:26

## 2018-02-19 RX ADMIN — Medication 125 MILLIGRAM(S): at 17:18

## 2018-02-19 RX ADMIN — Medication 2 PUFF(S): at 15:10

## 2018-02-19 RX ADMIN — Medication 1 TABLET(S): at 17:18

## 2018-02-19 RX ADMIN — Medication 1 TABLET(S): at 22:04

## 2018-02-19 RX ADMIN — Medication 216 GRAM(S): at 16:22

## 2018-02-19 RX ADMIN — Medication 1 TABLET(S): at 08:44

## 2018-02-19 RX ADMIN — Medication 2 PUFF(S): at 03:58

## 2018-02-19 RX ADMIN — Medication 1 MILLIGRAM(S): at 12:30

## 2018-02-19 RX ADMIN — PANTOPRAZOLE SODIUM 40 MILLIGRAM(S): 20 TABLET, DELAYED RELEASE ORAL at 12:30

## 2018-02-19 RX ADMIN — Medication 216 GRAM(S): at 20:02

## 2018-02-19 RX ADMIN — Medication 216 GRAM(S): at 12:30

## 2018-02-19 RX ADMIN — BUDESONIDE AND FORMOTEROL FUMARATE DIHYDRATE 2 PUFF(S): 160; 4.5 AEROSOL RESPIRATORY (INHALATION) at 20:16

## 2018-02-19 RX ADMIN — Medication 216 GRAM(S): at 05:04

## 2018-02-19 RX ADMIN — SIMETHICONE 80 MILLIGRAM(S): 80 TABLET, CHEWABLE ORAL at 12:37

## 2018-02-19 RX ADMIN — Medication 216 GRAM(S): at 08:44

## 2018-02-19 RX ADMIN — Medication 216 GRAM(S): at 01:02

## 2018-02-19 NOTE — PROGRESS NOTE ADULT - ATTENDING COMMENTS
Agree with above. Seen sitting up in a chair, comfortable, with wife at bedside  48 yo M with Sarcoidosis, s/p emergent left upper lobectomy for uncontrolled hemoptysis, respiratory failure s/p trach on mechanical ventilation.  Acute on chronic respiratory failure with VRE bacteremia of unclear etiology and MRSA tracheobronchitis.   Switch to Unasyn for VRE bacteremia for total of 2 weeks. Plan for PICC this week  Appreciate ID follow up.  Continue Bactrim for MRSA tracheitis.   Continue empiric PO vancomycin in setting of prior C. Diff.  CPAP and trach collar trial as tolerated- currently tolerating PS 5/5 - will give a trial of trach collar today.   Out of bed to chair, trach care, chest PT .

## 2018-02-19 NOTE — PROGRESS NOTE ADULT - PROBLEM SELECTOR PLAN 1
-VRE bacteremia   - now afebrile  - leukocytosis resolved  - 5 days of bactrim completed  - cont ampicillin x 2 weeks (thru 2/25), can change to unasyn upon discharge per ID  - PICC pending  - ID note appreciated  - RVP negative  - legionella negative  - blood culture cleared since 2/11  - JONATAN done in MICU, negative  - urine culture negative, CT A/P negative for source of bacteremia

## 2018-02-19 NOTE — PROGRESS NOTE ADULT - SUBJECTIVE AND OBJECTIVE BOX
CHIEF COMPLAINT:    Interval Events:    REVIEW OF SYSTEMS:  Constitutional:   Eyes:  ENT:  CV:  Resp:  GI:  :  MSK:  Integumentary:  Neurological:  Psychiatric:  Endocrine:  Hematologic/Lymphatic:  Allergic/Immunologic:  [ ] All other systems negative  [ ] Unable to assess ROS because ________    OBJECTIVE:  ICU Vital Signs Last 24 Hrs  T(C): 36.3 (19 Feb 2018 05:18), Max: 37.1 (18 Feb 2018 09:47)  T(F): 97.3 (19 Feb 2018 05:18), Max: 98.7 (18 Feb 2018 09:47)  HR: 96 (19 Feb 2018 06:43) (90 - 105)  BP: 94/80 (19 Feb 2018 05:18) (94/80 - 117/72)  BP(mean): --  ABP: --  ABP(mean): --  RR: 16 (19 Feb 2018 05:18) (16 - 22)  SpO2: 99% (19 Feb 2018 06:43) (98% - 100%)    Mode: AC/ CMV (Assist Control/ Continuous Mandatory Ventilation), RR (machine): 16, TV (machine): 500, FiO2: 40, PEEP: 5, MAP: 10, PIP: 23    02-18 @ 07:01  -  02-19 @ 07:00  --------------------------------------------------------  IN: 330 mL / OUT: 600 mL / NET: -270 mL      CAPILLARY BLOOD GLUCOSE          PHYSICAL EXAM:  General:   HEENT:   Lymph Nodes:  Neck:   Respiratory:   Cardiovascular:   Abdomen:   Extremities:   Skin:   Neurological:  Psychiatry:    HOSPITAL MEDICATIONS:  MEDICATIONS  (STANDING):  ALBUTerol    90 MICROgram(s) HFA Inhaler 2 Puff(s) Inhalation every 6 hours  ampicillin  IVPB 2 Gram(s) IV Intermittent every 4 hours  buDESOnide 160 MICROgram(s)/formoterol 4.5 MICROgram(s) Inhaler 2 Puff(s) Inhalation two times a day  chlorhexidine 4% Liquid 1 Application(s) Topical daily  docusate sodium Liquid 10 milliGRAM(s) Oral daily  enoxaparin Injectable 40 milliGRAM(s) SubCutaneous daily  folic acid 1 milliGRAM(s) Oral daily  ipratropium 17 MICROgram(s) HFA Inhaler 2 Puff(s) Inhalation every 6 hours  lactobacillus acidophilus 1 Tablet(s) Oral four times a day with meals  methylPREDNISolone sodium succinate Injectable 30 milliGRAM(s) IV Push daily  pantoprazole  Injectable 40 milliGRAM(s) IV Push daily  vancomycin    Solution 125 milliGRAM(s) Oral two times a day    MEDICATIONS  (PRN):  acetaminophen    Suspension 650 milliGRAM(s) Oral every 6 hours PRN For Temp greater than 38 C (100.4 F)  acetaminophen    Suspension 650 milliGRAM(s) Oral every 6 hours PRN mild and moderate and severe pain  ALBUTerol    90 MICROgram(s) HFA Inhaler 2 Puff(s) Inhalation every 6 hours PRN Bronchospasm  calamine Lotion 1 Application(s) Topical every 4 hours PRN Rash and/or Itching  LORazepam     Tablet 1 milliGRAM(s) Oral three times a day PRN Anxiety  oxyCODONE    IR 10 milliGRAM(s) Oral every 8 hours PRN Moderate Pain (4 - 6)  simethicone 80 milliGRAM(s) Chew four times a day PRN Gas      LABS:                        9.4    10.49 )-----------( 287      ( 19 Feb 2018 06:18 )             30.0     02-18    139  |  96<L>  |  16  ----------------------------<  122<H>  4.6   |  33<H>  |  0.93    Ca    9.1      18 Feb 2018 06:46                MICROBIOLOGY:     RADIOLOGY:  [ ] Reviewed and interpreted by me    PULMONARY FUNCTION TESTS:    EKG: CHIEF COMPLAINT: Patient is a 47y old  Male who presents with a chief complaint of acute on chronic respiratory distress (09 Feb 2018 19:01)    Interval Events: No acute events overnight    REVIEW OF SYSTEMS:  Constitutional: Feels ok  Eyes: Denies difficulty  ENT: Denies difficulty  CV: Denies CP  Resp: c/o intermittent SOB  GI: Hungry  : No difficulty  MSK:  Integumentary:  Neurological:  Psychiatric:  Endocrine:  Hematologic/Lymphatic:  Allergic/Immunologic:  [x] All other systems negative  [ ] Unable to assess ROS because ________    OBJECTIVE:  ICU Vital Signs Last 24 Hrs  T(C): 36.3 (19 Feb 2018 05:18), Max: 37.1 (18 Feb 2018 09:47)  T(F): 97.3 (19 Feb 2018 05:18), Max: 98.7 (18 Feb 2018 09:47)  HR: 96 (19 Feb 2018 06:43) (90 - 105)  BP: 94/80 (19 Feb 2018 05:18) (94/80 - 117/72)  BP(mean): --  ABP: --  ABP(mean): --  RR: 16 (19 Feb 2018 05:18) (16 - 22)  SpO2: 99% (19 Feb 2018 06:43) (98% - 100%)    Mode: AC/ CMV (Assist Control/ Continuous Mandatory Ventilation), RR (machine): 16, TV (machine): 500, FiO2: 40, PEEP: 5, MAP: 10, PIP: 23    02-18 @ 07:01  -  02-19 @ 07:00  --------------------------------------------------------  IN: 330 mL / OUT: 600 mL / NET: -270 mL      CAPILLARY BLOOD GLUCOSE    HOSPITAL MEDICATIONS:  MEDICATIONS  (STANDING):  ALBUTerol    90 MICROgram(s) HFA Inhaler 2 Puff(s) Inhalation every 6 hours  ampicillin  IVPB 2 Gram(s) IV Intermittent every 4 hours  buDESOnide 160 MICROgram(s)/formoterol 4.5 MICROgram(s) Inhaler 2 Puff(s) Inhalation two times a day  chlorhexidine 4% Liquid 1 Application(s) Topical daily  docusate sodium Liquid 10 milliGRAM(s) Oral daily  enoxaparin Injectable 40 milliGRAM(s) SubCutaneous daily  folic acid 1 milliGRAM(s) Oral daily  ipratropium 17 MICROgram(s) HFA Inhaler 2 Puff(s) Inhalation every 6 hours  lactobacillus acidophilus 1 Tablet(s) Oral four times a day with meals  methylPREDNISolone sodium succinate Injectable 30 milliGRAM(s) IV Push daily  pantoprazole  Injectable 40 milliGRAM(s) IV Push daily  vancomycin    Solution 125 milliGRAM(s) Oral two times a day    MEDICATIONS  (PRN):  acetaminophen    Suspension 650 milliGRAM(s) Oral every 6 hours PRN For Temp greater than 38 C (100.4 F)  acetaminophen    Suspension 650 milliGRAM(s) Oral every 6 hours PRN mild and moderate and severe pain  ALBUTerol    90 MICROgram(s) HFA Inhaler 2 Puff(s) Inhalation every 6 hours PRN Bronchospasm  calamine Lotion 1 Application(s) Topical every 4 hours PRN Rash and/or Itching  LORazepam     Tablet 1 milliGRAM(s) Oral three times a day PRN Anxiety  oxyCODONE    IR 10 milliGRAM(s) Oral every 8 hours PRN Moderate Pain (4 - 6)  simethicone 80 milliGRAM(s) Chew four times a day PRN Gas      LABS:                        9.4    10.49 )-----------( 287      ( 19 Feb 2018 06:18 )             30.0     02-18    139  |  96<L>  |  16  ----------------------------<  122<H>  4.6   |  33<H>  |  0.93    Ca    9.1      18 Feb 2018 06:46                MICROBIOLOGY:     RADIOLOGY:  [ ] Reviewed and interpreted by me    PULMONARY FUNCTION TESTS:    EKG:

## 2018-02-20 LAB — BACTERIA BLD CULT: SIGNIFICANT CHANGE UP

## 2018-02-20 PROCEDURE — 99232 SBSQ HOSP IP/OBS MODERATE 35: CPT

## 2018-02-20 PROCEDURE — 99233 SBSQ HOSP IP/OBS HIGH 50: CPT | Mod: GC

## 2018-02-20 RX ADMIN — Medication 125 MILLIGRAM(S): at 17:24

## 2018-02-20 RX ADMIN — Medication 1 TABLET(S): at 11:34

## 2018-02-20 RX ADMIN — BUDESONIDE AND FORMOTEROL FUMARATE DIHYDRATE 2 PUFF(S): 160; 4.5 AEROSOL RESPIRATORY (INHALATION) at 22:12

## 2018-02-20 RX ADMIN — Medication 2 PUFF(S): at 22:16

## 2018-02-20 RX ADMIN — Medication 2 PUFF(S): at 10:20

## 2018-02-20 RX ADMIN — Medication 216 GRAM(S): at 07:32

## 2018-02-20 RX ADMIN — Medication 216 GRAM(S): at 20:52

## 2018-02-20 RX ADMIN — Medication 1 TABLET(S): at 07:32

## 2018-02-20 RX ADMIN — Medication 1 MILLIGRAM(S): at 11:35

## 2018-02-20 RX ADMIN — Medication 216 GRAM(S): at 00:42

## 2018-02-20 RX ADMIN — Medication 650 MILLIGRAM(S): at 21:46

## 2018-02-20 RX ADMIN — Medication 1 TABLET(S): at 17:24

## 2018-02-20 RX ADMIN — Medication 216 GRAM(S): at 04:50

## 2018-02-20 RX ADMIN — Medication 1 TABLET(S): at 21:13

## 2018-02-20 RX ADMIN — ENOXAPARIN SODIUM 40 MILLIGRAM(S): 100 INJECTION SUBCUTANEOUS at 11:35

## 2018-02-20 RX ADMIN — Medication 125 MILLIGRAM(S): at 05:09

## 2018-02-20 RX ADMIN — Medication 2 PUFF(S): at 05:16

## 2018-02-20 RX ADMIN — Medication 216 GRAM(S): at 15:08

## 2018-02-20 RX ADMIN — Medication 216 GRAM(S): at 11:34

## 2018-02-20 RX ADMIN — Medication 2 PUFF(S): at 22:12

## 2018-02-20 RX ADMIN — BUDESONIDE AND FORMOTEROL FUMARATE DIHYDRATE 2 PUFF(S): 160; 4.5 AEROSOL RESPIRATORY (INHALATION) at 10:19

## 2018-02-20 RX ADMIN — CHLORHEXIDINE GLUCONATE 1 APPLICATION(S): 213 SOLUTION TOPICAL at 11:34

## 2018-02-20 RX ADMIN — Medication 2 PUFF(S): at 16:07

## 2018-02-20 RX ADMIN — Medication 30 MILLIGRAM(S): at 05:08

## 2018-02-20 RX ADMIN — PANTOPRAZOLE SODIUM 40 MILLIGRAM(S): 20 TABLET, DELAYED RELEASE ORAL at 11:34

## 2018-02-20 NOTE — PROGRESS NOTE ADULT - PROBLEM SELECTOR PLAN 2
- now improved s/p trach changed to #8 XLT  - tolerating vent settings and long periods of trach collar  - cont to wean as tolerated  - chest PT  - trach care daily  - suction prn

## 2018-02-20 NOTE — PROGRESS NOTE ADULT - SUBJECTIVE AND OBJECTIVE BOX
CC: F/U bacteremia    Interval History/ROS: Patient sitting in chair, remains vented. Denies fever, chills, N/V/D/C.    Allergies  No Known Allergies      ANTIMICROBIALS:  ampicillin  IVPB 2 every 4 hours  vancomycin    Solution 125 two times a day      PE:    Vital Signs Last 24 Hrs  T(C): 37.1 (20 Feb 2018 13:05), Max: 37.1 (20 Feb 2018 09:52)  T(F): 98.7 (20 Feb 2018 13:05), Max: 98.7 (20 Feb 2018 09:52)  HR: 99 (20 Feb 2018 13:05) (99 - 119)  BP: 108/85 (20 Feb 2018 13:05) (95/74 - 124/86)  BP(mean): --  RR: 19 (20 Feb 2018 13:05) (16 - 22)  SpO2: 99% (20 Feb 2018 13:05) (96% - 100%)    Gen: AOx3, NAD, non-toxic, sitting in chair comfortable  HEENT: trached, vented  CV: S1+S2 normal, no murmurs  Resp: Clear bilat, no resp distress  Abd: Soft, nontender, +BS, +PEG  Ext: No wounds  IV/Skin: No thrombophlebitis  Neuro: no focal deficits    LABS:                          9.4    10.49 )-----------( 287      ( 19 Feb 2018 06:18 )             30.0       02-19    138  |  95<L>  |  20  ----------------------------<  147<H>  4.5   |  30  |  0.99    Ca    10.0      19 Feb 2018 06:18    MICROBIOLOGY:  v  BLOOD  02-15-18 --  --  --      BLOOD  02-11-18 --  --  --      SPUTUM  02-10-18 --  --  Staph. aureus *MRSA*      URINE MIDSTREAM  02-09-18 --  --  --      BLOOD PERIPHERAL  02-09-18 --  --  --      BLOOD  02-09-18 --  --  BLOOD CULTURE PCR  Enterococcus faecalis VRE    RADIOLOGY:    No new images.

## 2018-02-20 NOTE — PROGRESS NOTE ADULT - SUBJECTIVE AND OBJECTIVE BOX
CHIEF COMPLAINT:    Interval Events:    REVIEW OF SYSTEMS:  Constitutional:   Eyes:  ENT:  CV:  Resp:  GI:  :  MSK:  Integumentary:  Neurological:  Psychiatric:  Endocrine:  Hematologic/Lymphatic:  Allergic/Immunologic:  [ ] All other systems negative  [ ] Unable to assess ROS because ________    OBJECTIVE:  ICU Vital Signs Last 24 Hrs  T(C): 37.1 (20 Feb 2018 09:52), Max: 37.1 (20 Feb 2018 09:52)  T(F): 98.7 (20 Feb 2018 09:52), Max: 98.7 (20 Feb 2018 09:52)  HR: 119 (20 Feb 2018 10:52) (100 - 119)  BP: 112/84 (20 Feb 2018 09:52) (95/74 - 124/86)  BP(mean): --  ABP: --  ABP(mean): --  RR: 20 (20 Feb 2018 10:53) (16 - 22)  SpO2: 96% (20 Feb 2018 10:53) (96% - 100%)    Mode: standby,pt on t/c 40%    02-19 @ 07:01  -  02-20 @ 07:00  --------------------------------------------------------  IN: 540 mL / OUT: 1200 mL / NET: -660 mL      CAPILLARY BLOOD GLUCOSE          PHYSICAL EXAM:  General:   HEENT:   Lymph Nodes:  Neck:   Respiratory:   Cardiovascular:   Abdomen:   Extremities:   Skin:   Neurological:  Psychiatry:    HOSPITAL MEDICATIONS:  MEDICATIONS  (STANDING):  ALBUTerol    90 MICROgram(s) HFA Inhaler 2 Puff(s) Inhalation every 6 hours  ampicillin  IVPB 2 Gram(s) IV Intermittent every 4 hours  buDESOnide 160 MICROgram(s)/formoterol 4.5 MICROgram(s) Inhaler 2 Puff(s) Inhalation two times a day  chlorhexidine 4% Liquid 1 Application(s) Topical daily  docusate sodium Liquid 10 milliGRAM(s) Oral daily  enoxaparin Injectable 40 milliGRAM(s) SubCutaneous daily  folic acid 1 milliGRAM(s) Oral daily  ipratropium 17 MICROgram(s) HFA Inhaler 2 Puff(s) Inhalation every 6 hours  lactobacillus acidophilus 1 Tablet(s) Oral four times a day with meals  methylPREDNISolone sodium succinate Injectable 30 milliGRAM(s) IV Push daily  pantoprazole  Injectable 40 milliGRAM(s) IV Push daily  vancomycin    Solution 125 milliGRAM(s) Oral two times a day    MEDICATIONS  (PRN):  acetaminophen    Suspension 650 milliGRAM(s) Oral every 6 hours PRN For Temp greater than 38 C (100.4 F)  acetaminophen    Suspension 650 milliGRAM(s) Oral every 6 hours PRN mild and moderate and severe pain  ALBUTerol    90 MICROgram(s) HFA Inhaler 2 Puff(s) Inhalation every 6 hours PRN Bronchospasm  calamine Lotion 1 Application(s) Topical every 4 hours PRN Rash and/or Itching  LORazepam    Concentrate 1 milliGRAM(s) Oral three times a day PRN Anxiety  oxyCODONE    IR 10 milliGRAM(s) Oral every 8 hours PRN Moderate Pain (4 - 6)  simethicone drops 80 milliGRAM(s) Oral four times a day PRN Gas      LABS:                        9.4    10.49 )-----------( 287      ( 19 Feb 2018 06:18 )             30.0     02-19    138  |  95<L>  |  20  ----------------------------<  147<H>  4.5   |  30  |  0.99    Ca    10.0      19 Feb 2018 06:18                MICROBIOLOGY:     RADIOLOGY:  [ ] Reviewed and interpreted by me    PULMONARY FUNCTION TESTS:    EKG: CHIEF COMPLAINT: Patient is a 47y old  Male who presents with a chief complaint of acute on chronic respiratory distress (09 Feb 2018 19:01)    Interval Events: No acute events overnight    REVIEW OF SYSTEMS:  Constitutional: Feels ok. Denies pain  Eyes: Denies difficulty  ENT: Denies difficulty  CV: Denies CP  Resp: Denies respiratory distress at this time  GI: Hungry  : Denies difficulty  MSK: left weakness  Integumentary:  Neurological:  Psychiatric:  Endocrine:  Hematologic/Lymphatic:  Allergic/Immunologic:  [x] All other systems negative  [ ] Unable to assess ROS because ________    OBJECTIVE:  ICU Vital Signs Last 24 Hrs  T(C): 37.1 (20 Feb 2018 09:52), Max: 37.1 (20 Feb 2018 09:52)  T(F): 98.7 (20 Feb 2018 09:52), Max: 98.7 (20 Feb 2018 09:52)  HR: 119 (20 Feb 2018 10:52) (100 - 119)  BP: 112/84 (20 Feb 2018 09:52) (95/74 - 124/86)  BP(mean): --  ABP: --  ABP(mean): --  RR: 20 (20 Feb 2018 10:53) (16 - 22)  SpO2: 96% (20 Feb 2018 10:53) (96% - 100%)    Mode: standby,pt on t/c 40%    02-19 @ 07:01  -  02-20 @ 07:00  --------------------------------------------------------  IN: 540 mL / OUT: 1200 mL / NET: -660 mL      CAPILLARY BLOOD GLUCOSE      HOSPITAL MEDICATIONS:  MEDICATIONS  (STANDING):  ALBUTerol    90 MICROgram(s) HFA Inhaler 2 Puff(s) Inhalation every 6 hours  ampicillin  IVPB 2 Gram(s) IV Intermittent every 4 hours  buDESOnide 160 MICROgram(s)/formoterol 4.5 MICROgram(s) Inhaler 2 Puff(s) Inhalation two times a day  chlorhexidine 4% Liquid 1 Application(s) Topical daily  docusate sodium Liquid 10 milliGRAM(s) Oral daily  enoxaparin Injectable 40 milliGRAM(s) SubCutaneous daily  folic acid 1 milliGRAM(s) Oral daily  ipratropium 17 MICROgram(s) HFA Inhaler 2 Puff(s) Inhalation every 6 hours  lactobacillus acidophilus 1 Tablet(s) Oral four times a day with meals  methylPREDNISolone sodium succinate Injectable 30 milliGRAM(s) IV Push daily  pantoprazole  Injectable 40 milliGRAM(s) IV Push daily  vancomycin    Solution 125 milliGRAM(s) Oral two times a day    MEDICATIONS  (PRN):  acetaminophen    Suspension 650 milliGRAM(s) Oral every 6 hours PRN For Temp greater than 38 C (100.4 F)  acetaminophen    Suspension 650 milliGRAM(s) Oral every 6 hours PRN mild and moderate and severe pain  ALBUTerol    90 MICROgram(s) HFA Inhaler 2 Puff(s) Inhalation every 6 hours PRN Bronchospasm  calamine Lotion 1 Application(s) Topical every 4 hours PRN Rash and/or Itching  LORazepam    Concentrate 1 milliGRAM(s) Oral three times a day PRN Anxiety  oxyCODONE    IR 10 milliGRAM(s) Oral every 8 hours PRN Moderate Pain (4 - 6)  simethicone drops 80 milliGRAM(s) Oral four times a day PRN Gas      LABS:                        9.4    10.49 )-----------( 287      ( 19 Feb 2018 06:18 )             30.0     02-19    138  |  95<L>  |  20  ----------------------------<  147<H>  4.5   |  30  |  0.99    Ca    10.0      19 Feb 2018 06:18                MICROBIOLOGY:     RADIOLOGY:  [ ] Reviewed and interpreted by me    PULMONARY FUNCTION TESTS:    EKG:

## 2018-02-21 PROCEDURE — 99233 SBSQ HOSP IP/OBS HIGH 50: CPT | Mod: GC

## 2018-02-21 PROCEDURE — 99232 SBSQ HOSP IP/OBS MODERATE 35: CPT

## 2018-02-21 RX ADMIN — BUDESONIDE AND FORMOTEROL FUMARATE DIHYDRATE 2 PUFF(S): 160; 4.5 AEROSOL RESPIRATORY (INHALATION) at 10:55

## 2018-02-21 RX ADMIN — BUDESONIDE AND FORMOTEROL FUMARATE DIHYDRATE 2 PUFF(S): 160; 4.5 AEROSOL RESPIRATORY (INHALATION) at 22:07

## 2018-02-21 RX ADMIN — Medication 216 GRAM(S): at 12:32

## 2018-02-21 RX ADMIN — Medication 216 GRAM(S): at 16:21

## 2018-02-21 RX ADMIN — Medication 1 TABLET(S): at 22:09

## 2018-02-21 RX ADMIN — PANTOPRAZOLE SODIUM 40 MILLIGRAM(S): 20 TABLET, DELAYED RELEASE ORAL at 12:32

## 2018-02-21 RX ADMIN — Medication 216 GRAM(S): at 19:57

## 2018-02-21 RX ADMIN — Medication 30 MILLIGRAM(S): at 05:41

## 2018-02-21 RX ADMIN — Medication 2 PUFF(S): at 04:12

## 2018-02-21 RX ADMIN — Medication 1 TABLET(S): at 12:33

## 2018-02-21 RX ADMIN — Medication 2 PUFF(S): at 16:29

## 2018-02-21 RX ADMIN — Medication 1 MILLIGRAM(S): at 12:32

## 2018-02-21 RX ADMIN — Medication 216 GRAM(S): at 08:23

## 2018-02-21 RX ADMIN — Medication 1 TABLET(S): at 08:23

## 2018-02-21 RX ADMIN — Medication 2 PUFF(S): at 10:55

## 2018-02-21 RX ADMIN — Medication 1 TABLET(S): at 17:17

## 2018-02-21 RX ADMIN — Medication 125 MILLIGRAM(S): at 05:41

## 2018-02-21 RX ADMIN — ALBUTEROL 2 PUFF(S): 90 AEROSOL, METERED ORAL at 22:07

## 2018-02-21 RX ADMIN — Medication 216 GRAM(S): at 05:40

## 2018-02-21 RX ADMIN — Medication 216 GRAM(S): at 00:25

## 2018-02-21 RX ADMIN — Medication 125 MILLIGRAM(S): at 17:17

## 2018-02-21 RX ADMIN — Medication 2 PUFF(S): at 22:07

## 2018-02-21 RX ADMIN — CHLORHEXIDINE GLUCONATE 1 APPLICATION(S): 213 SOLUTION TOPICAL at 12:32

## 2018-02-21 RX ADMIN — ENOXAPARIN SODIUM 40 MILLIGRAM(S): 100 INJECTION SUBCUTANEOUS at 12:33

## 2018-02-21 NOTE — PROGRESS NOTE ADULT - SUBJECTIVE AND OBJECTIVE BOX
CHIEF COMPLAINT:    Interval Events:    REVIEW OF SYSTEMS:  Constitutional:   Eyes:  ENT:  CV:  Resp:  GI:  :  MSK:  Integumentary:  Neurological:  Psychiatric:  Endocrine:  Hematologic/Lymphatic:  Allergic/Immunologic:  [ ] All other systems negative  [ ] Unable to assess ROS because ________    OBJECTIVE:  ICU Vital Signs Last 24 Hrs  T(C): 36.9 (21 Feb 2018 10:06), Max: 37.3 (20 Feb 2018 17:26)  T(F): 98.4 (21 Feb 2018 10:06), Max: 99.1 (20 Feb 2018 17:26)  HR: 111 (21 Feb 2018 10:55) (99 - 124)  BP: 103/81 (21 Feb 2018 10:06) (103/81 - 123/91)  BP(mean): --  ABP: --  ABP(mean): --  RR: 21 (21 Feb 2018 10:06) (19 - 21)  SpO2: 95% (21 Feb 2018 10:55) (95% - 100%)    Mode: standby    02-20 @ 07:01 - 02-21 @ 07:00  --------------------------------------------------------  IN: 1790 mL / OUT: 1850 mL / NET: -60 mL    02-21 @ 07:01  - 02-21 @ 12:06  --------------------------------------------------------  IN: 0 mL / OUT: 250 mL / NET: -250 mL      CAPILLARY BLOOD GLUCOSE          PHYSICAL EXAM:  General:   HEENT:   Lymph Nodes:  Neck:   Respiratory:   Cardiovascular:   Abdomen:   Extremities:   Skin:   Neurological:  Psychiatry:    HOSPITAL MEDICATIONS:  MEDICATIONS  (STANDING):  ALBUTerol    90 MICROgram(s) HFA Inhaler 2 Puff(s) Inhalation every 6 hours  ampicillin  IVPB 2 Gram(s) IV Intermittent every 4 hours  buDESOnide 160 MICROgram(s)/formoterol 4.5 MICROgram(s) Inhaler 2 Puff(s) Inhalation two times a day  chlorhexidine 4% Liquid 1 Application(s) Topical daily  docusate sodium Liquid 10 milliGRAM(s) Oral daily  enoxaparin Injectable 40 milliGRAM(s) SubCutaneous daily  folic acid 1 milliGRAM(s) Oral daily  ipratropium 17 MICROgram(s) HFA Inhaler 2 Puff(s) Inhalation every 6 hours  lactobacillus acidophilus 1 Tablet(s) Oral four times a day with meals  methylPREDNISolone sodium succinate Injectable 30 milliGRAM(s) IV Push daily  pantoprazole  Injectable 40 milliGRAM(s) IV Push daily  vancomycin    Solution 125 milliGRAM(s) Oral two times a day    MEDICATIONS  (PRN):  acetaminophen    Suspension 650 milliGRAM(s) Oral every 6 hours PRN For Temp greater than 38 C (100.4 F)  acetaminophen    Suspension 650 milliGRAM(s) Oral every 6 hours PRN mild and moderate and severe pain  ALBUTerol    90 MICROgram(s) HFA Inhaler 2 Puff(s) Inhalation every 6 hours PRN Bronchospasm  calamine Lotion 1 Application(s) Topical every 4 hours PRN Rash and/or Itching  LORazepam    Concentrate 1 milliGRAM(s) Oral three times a day PRN Anxiety  oxyCODONE    IR 10 milliGRAM(s) Oral every 8 hours PRN Moderate Pain (4 - 6)  simethicone drops 80 milliGRAM(s) Oral four times a day PRN Gas      LABS:                    MICROBIOLOGY:     RADIOLOGY:  [ ] Reviewed and interpreted by me    PULMONARY FUNCTION TESTS:    EKG: CHIEF COMPLAINT: Patient is a 47y old  Male who presents with a chief complaint of acute on chronic respiratory distress (09 Feb 2018 19:01)    Interval Events: No acute events overnight    REVIEW OF SYSTEMS:  Constitutional: Feeling ok. Wants to go home  Eyes: Denies difficulty  ENT: Denies difficulty  CV: Denies CP  Resp: Denies respiratory distress  GI: Denies GI distress. Wants to eat  : Denies difficulty  MSK: Left weakness  Integumentary:  Neurological:  Psychiatric:  Endocrine:  Hematologic/Lymphatic:  Allergic/Immunologic:  [x] All other systems negative  [ ] Unable to assess ROS because ________    OBJECTIVE:  ICU Vital Signs Last 24 Hrs  T(C): 36.9 (21 Feb 2018 10:06), Max: 37.3 (20 Feb 2018 17:26)  T(F): 98.4 (21 Feb 2018 10:06), Max: 99.1 (20 Feb 2018 17:26)  HR: 111 (21 Feb 2018 10:55) (99 - 124)  BP: 103/81 (21 Feb 2018 10:06) (103/81 - 123/91)  BP(mean): --  ABP: --  ABP(mean): --  RR: 21 (21 Feb 2018 10:06) (19 - 21)  SpO2: 95% (21 Feb 2018 10:55) (95% - 100%)    Mode: standby    02-20 @ 07:01 - 02-21 @ 07:00  --------------------------------------------------------  IN: 1790 mL / OUT: 1850 mL / NET: -60 mL    02-21 @ 07:01 - 02-21 @ 12:06  --------------------------------------------------------  IN: 0 mL / OUT: 250 mL / NET: -250 mL      CAPILLARY BLOOD GLUCOSE    HOSPITAL MEDICATIONS:  MEDICATIONS  (STANDING):  ALBUTerol    90 MICROgram(s) HFA Inhaler 2 Puff(s) Inhalation every 6 hours  ampicillin  IVPB 2 Gram(s) IV Intermittent every 4 hours  buDESOnide 160 MICROgram(s)/formoterol 4.5 MICROgram(s) Inhaler 2 Puff(s) Inhalation two times a day  chlorhexidine 4% Liquid 1 Application(s) Topical daily  docusate sodium Liquid 10 milliGRAM(s) Oral daily  enoxaparin Injectable 40 milliGRAM(s) SubCutaneous daily  folic acid 1 milliGRAM(s) Oral daily  ipratropium 17 MICROgram(s) HFA Inhaler 2 Puff(s) Inhalation every 6 hours  lactobacillus acidophilus 1 Tablet(s) Oral four times a day with meals  methylPREDNISolone sodium succinate Injectable 30 milliGRAM(s) IV Push daily  pantoprazole  Injectable 40 milliGRAM(s) IV Push daily  vancomycin    Solution 125 milliGRAM(s) Oral two times a day    MEDICATIONS  (PRN):  acetaminophen    Suspension 650 milliGRAM(s) Oral every 6 hours PRN For Temp greater than 38 C (100.4 F)  acetaminophen    Suspension 650 milliGRAM(s) Oral every 6 hours PRN mild and moderate and severe pain  ALBUTerol    90 MICROgram(s) HFA Inhaler 2 Puff(s) Inhalation every 6 hours PRN Bronchospasm  calamine Lotion 1 Application(s) Topical every 4 hours PRN Rash and/or Itching  LORazepam    Concentrate 1 milliGRAM(s) Oral three times a day PRN Anxiety  oxyCODONE    IR 10 milliGRAM(s) Oral every 8 hours PRN Moderate Pain (4 - 6)  simethicone drops 80 milliGRAM(s) Oral four times a day PRN Gas      LABS:                    MICROBIOLOGY:     RADIOLOGY:  [ ] Reviewed and interpreted by me    PULMONARY FUNCTION TESTS:    EKG:

## 2018-02-21 NOTE — PROGRESS NOTE ADULT - SUBJECTIVE AND OBJECTIVE BOX
CC: F/U VRE bacteremia    Interval History/ROS: Patient has no complaints today. Denies fever, chills.     Allergies  No Known Allergies    ANTIMICROBIALS:  ampicillin  IVPB 2 every 4 hours  vancomycin    Solution 125 two times a day    PE:    Vital Signs Last 24 Hrs  T(C): 36.8 (21 Feb 2018 05:38), Max: 37.3 (20 Feb 2018 17:26)  T(F): 98.3 (21 Feb 2018 05:38), Max: 99.1 (20 Feb 2018 17:26)  HR: 111 (21 Feb 2018 07:02) (99 - 124)  BP: 104/81 (21 Feb 2018 05:38) (104/81 - 123/91)  BP(mean): --  RR: 20 (21 Feb 2018 05:38) (19 - 20)  SpO2: 100% (21 Feb 2018 07:02) (96% - 100%)    Gen: NAD, non-toxic, pleasant  HEENT: trach  CV: S1+S2 normal, no murmurs  Resp: Clear bilat, no resp distress  Abd: Soft, nontender, +PEG - intact, no erythema  Ext: No LE edema, no wounds  IV/Skin: No thrombophlebitis  Neuro: no focal deficits    LABS:    MICROBIOLOGY:  v  BLOOD  02-15-18 --  --  --      BLOOD  02-11-18 --  --  --      SPUTUM  02-10-18 --  --  Staph. aureus *MRSA*      URINE MIDSTREAM  02-09-18 --  --  --      BLOOD PERIPHERAL  02-09-18 --  --  --      BLOOD  02-09-18 --  --  BLOOD CULTURE PCR  Enterococcus faecalis VRE    RADIOLOGY:    No new images.

## 2018-02-21 NOTE — PROGRESS NOTE ADULT - PROBLEM SELECTOR PLAN 1
-VRE bacteremia   - now afebrile  - leukocytosis resolved  - 5 days of bactrim completed  - cont ampicillin x 2 weeks (thru 2/25), can change to unasyn upon discharge per ID  - ID note appreciated  - RVP negative  - legionella negative  - blood culture cleared since 2/11  - JONATAN done in MICU, negative  - urine culture negative, CT A/P negative for source of bacteremia

## 2018-02-21 NOTE — PROGRESS NOTE ADULT - PROBLEM SELECTOR PLAN 2
- now improved s/p trach changed to #8 XLT  - tolerating vent settings and long periods of trach collar  - cont to wean as tolerated  - chest PT  - trach care daily  - suction prn  - Trach care education

## 2018-02-21 NOTE — PROGRESS NOTE ADULT - ATTENDING COMMENTS
chronic resp failure with hypoxia  end stage pulm sacroidosis, s/p surgical resection aspergilloma, complicated post op course  readmitted for tracheitis.  overall, doing very well  ampicillin through 2/25  TC as much as tolerated, yesterday through 8pm, will try to see if can go overnight  PT - as much as can get  speech/swallow eval while on TC  d/c planning - pt wants to go home, but understands difficult with vent.

## 2018-02-22 LAB
BASE EXCESS BLDA CALC-SCNC: 10 MMOL/L — SIGNIFICANT CHANGE UP
HCO3 BLDA-SCNC: 33 MMOL/L — HIGH (ref 22–26)
PCO2 BLDA: 57 MMHG — HIGH (ref 35–48)
PH BLDA: 7.4 PH — SIGNIFICANT CHANGE UP (ref 7.35–7.45)
PO2 BLDA: 144 MMHG — HIGH (ref 83–108)
SAO2 % BLDA: 99.2 % — HIGH (ref 95–99)

## 2018-02-22 PROCEDURE — 99233 SBSQ HOSP IP/OBS HIGH 50: CPT | Mod: GC

## 2018-02-22 PROCEDURE — 99222 1ST HOSP IP/OBS MODERATE 55: CPT

## 2018-02-22 RX ORDER — DOCUSATE SODIUM 100 MG
100 CAPSULE ORAL
Qty: 0 | Refills: 0 | Status: DISCONTINUED | OUTPATIENT
Start: 2018-02-22 | End: 2018-02-27

## 2018-02-22 RX ADMIN — PANTOPRAZOLE SODIUM 40 MILLIGRAM(S): 20 TABLET, DELAYED RELEASE ORAL at 11:51

## 2018-02-22 RX ADMIN — Medication 1 MILLIGRAM(S): at 18:57

## 2018-02-22 RX ADMIN — Medication 2 PUFF(S): at 10:21

## 2018-02-22 RX ADMIN — Medication 1 TABLET(S): at 21:22

## 2018-02-22 RX ADMIN — Medication 125 MILLIGRAM(S): at 07:00

## 2018-02-22 RX ADMIN — Medication 1 MILLIGRAM(S): at 09:53

## 2018-02-22 RX ADMIN — Medication 2 PUFF(S): at 05:10

## 2018-02-22 RX ADMIN — Medication 2 PUFF(S): at 16:11

## 2018-02-22 RX ADMIN — Medication 650 MILLIGRAM(S): at 20:25

## 2018-02-22 RX ADMIN — Medication 1 TABLET(S): at 08:17

## 2018-02-22 RX ADMIN — Medication 216 GRAM(S): at 23:28

## 2018-02-22 RX ADMIN — Medication 216 GRAM(S): at 20:25

## 2018-02-22 RX ADMIN — Medication 1 TABLET(S): at 11:51

## 2018-02-22 RX ADMIN — Medication 216 GRAM(S): at 03:27

## 2018-02-22 RX ADMIN — BUDESONIDE AND FORMOTEROL FUMARATE DIHYDRATE 2 PUFF(S): 160; 4.5 AEROSOL RESPIRATORY (INHALATION) at 10:21

## 2018-02-22 RX ADMIN — Medication 216 GRAM(S): at 15:36

## 2018-02-22 RX ADMIN — Medication 650 MILLIGRAM(S): at 00:12

## 2018-02-22 RX ADMIN — Medication 2 PUFF(S): at 21:22

## 2018-02-22 RX ADMIN — ENOXAPARIN SODIUM 40 MILLIGRAM(S): 100 INJECTION SUBCUTANEOUS at 11:51

## 2018-02-22 RX ADMIN — Medication 216 GRAM(S): at 07:41

## 2018-02-22 RX ADMIN — Medication 216 GRAM(S): at 11:51

## 2018-02-22 RX ADMIN — Medication 1 MILLIGRAM(S): at 11:51

## 2018-02-22 RX ADMIN — CHLORHEXIDINE GLUCONATE 1 APPLICATION(S): 213 SOLUTION TOPICAL at 11:46

## 2018-02-22 RX ADMIN — Medication 1 TABLET(S): at 17:46

## 2018-02-22 RX ADMIN — BUDESONIDE AND FORMOTEROL FUMARATE DIHYDRATE 2 PUFF(S): 160; 4.5 AEROSOL RESPIRATORY (INHALATION) at 21:22

## 2018-02-22 RX ADMIN — Medication 216 GRAM(S): at 00:13

## 2018-02-22 RX ADMIN — Medication 30 MILLIGRAM(S): at 06:59

## 2018-02-22 RX ADMIN — Medication 125 MILLIGRAM(S): at 17:47

## 2018-02-22 NOTE — CONSULT NOTE ADULT - SUBJECTIVE AND OBJECTIVE BOX
Patient is a 47y old  Male who presents with a chief complaint of acute on chronic respiratory distress (2018 19:01)      HPI:  47M with h/o HTN, PFO,sarcoidosis c/b aspergilosis (s/p Voriconazole course in 2017), asthma/COPD, s/p CVA w/ L hemiplegia ((2017), pAfib (not on A/C 2/2 recurrent hemoptysis), s/p emergency L upper lobectomy for hemoptysis requiring IR emoblization of L bronchial artery (2017) complicated by pneumothorax requiring chest tube, s/p tracheostomy (17) for acute hypoxic respiratory failure and PEG placement (18), broncho-pulmonary fistula, thyroid arterial pesuodaneyrusm and recent C.diff colitis (2018 s/p course of vanco/flagyl via PEG)  presenting from acute nursing facility for acute on chronic respiratory failure, tachycardia and fevers x 1 day. Of note, collateral history obtained from chart ([facility] documents) and family present at bedside. Three days prior, pt started developing low grade fevers (Tm 100.5) and developed right -sided abdominal discomfort. Today he developed respiratory distress and productive cough w/ some purulent drainage around his trach site. Flu swab done on 18 and was negative; pt however, was empirically started on Tamiflu (uncertain how many days completed). CXR on  performed reveaeld diffuse lung infiltrates; pt was empirically started on Cefepime since 18 per ID recommendations at [facility]. Pt has been on a weaning protocol since 18 and his ventilatory settings have been gradually decreased with intermittent CPAP trials. His weaning has been on hold since  in the setting of his acute illness. This morning, MD at [facility] notified that pt had developed acute respiratory distress and found to have an elevated pCO2 of 64-66. At that time, pt placed back on vent support AC with good response and decreased pCO2 to 40-47 and improvement in his tachycardia. This evening, due to ongoing respiratory symptoms, including productive cough, respiratory distress and diaphoresis, patient and family request transfer to hospital.     While in the ED, VS : T: 99.5 (rectally), HR: 116 --> 170s, BP: 133/93, RR : 16, SpO2 of 98% on AC/CMV mode. Pt recieved adenosine 12mg IV x 1 which confirmed pt to be in SVT. Recieved empirice vancomycin 1gm IV x 1 and zosyn 3.375gm IV x 1. Pt recieved 2L NS bolus. Labs on admission remarkable for CBC of 19.87 (neutrophil predominance of 86.5%), Hgb of 8.8 (baseline ~7-8), Hct of 29.5, Na of 132, bicarb of 36, serum lactate of 1.5. VB.27/92/52/35 O2 79.1. (2018 19:01)      REVIEW OF SYSTEMS: No chest pain, shortness of breath, nausea, vomiting or diarhea.      PAST MEDICAL & SURGICAL HISTORY  History of pneumothorax  COPD (chronic obstructive pulmonary disease)  Asthma  Hypertension  Sarcoidosis  History of thoracotomy  No significant past surgical history      SOCIAL HISTORY  Smoking - Denied, EtOH - Denied, Drugs - Denied    FUNCTIONAL HISTORY:   Lives   Independent    CURRENT FUNCTIONAL STATUS:      FAMILY HISTORY   Family history of pancreatic cancer (Father)  Family history of essential hypertension (Father)      RECENT LABS/IMAGING              VITALS  T(C): 37.2 (18 @ 13:38), Max: 37.2 (18 @ 13:38)  HR: 112 (18 @ 16:12) (100 - 118)  BP: 117/85 (18 @ 13:38) (106/80 - 125/93)  RR: 18 (18 @ 13:38) (16 - 20)  SpO2: 100% (18 @ 14:59) (94% - 100%)  Wt(kg): --    ALLERGIES  No Known Allergies      MEDICATIONS   acetaminophen    Suspension 650 milliGRAM(s) Oral every 6 hours PRN  acetaminophen    Suspension 650 milliGRAM(s) Oral every 6 hours PRN  ALBUTerol    90 MICROgram(s) HFA Inhaler 2 Puff(s) Inhalation every 6 hours  ALBUTerol    90 MICROgram(s) HFA Inhaler 2 Puff(s) Inhalation every 6 hours PRN  ampicillin  IVPB 2 Gram(s) IV Intermittent every 4 hours  buDESOnide 160 MICROgram(s)/formoterol 4.5 MICROgram(s) Inhaler 2 Puff(s) Inhalation two times a day  calamine Lotion 1 Application(s) Topical every 4 hours PRN  chlorhexidine 4% Liquid 1 Application(s) Topical daily  docusate sodium Liquid 100 milliGRAM(s) Oral two times a day  enoxaparin Injectable 40 milliGRAM(s) SubCutaneous daily  folic acid 1 milliGRAM(s) Oral daily  ipratropium 17 MICROgram(s) HFA Inhaler 2 Puff(s) Inhalation every 6 hours  lactobacillus acidophilus 1 Tablet(s) Oral four times a day with meals  LORazepam    Concentrate 1 milliGRAM(s) Oral three times a day PRN  oxyCODONE    IR 10 milliGRAM(s) Oral every 8 hours PRN  pantoprazole  Injectable 40 milliGRAM(s) IV Push daily  simethicone drops 80 milliGRAM(s) Oral four times a day PRN  vancomycin    Solution 125 milliGRAM(s) Oral two times a day      ----------------------------------------------------------------------------------------  PHYSICAL EXAM  Constitutional - NAD, Comfortable  HEENT - NCAT, EOMI  Neck - Supple, No limited ROM  Chest - CTA bilaterally, No wheeze, No rhonchi, No crackles  Cardiovascular - RRR, S1S2, No murmurs  Abdomen - BS+, Soft, NTND  Extremities - No C/C/E, No calf tenderness   Neurologic Exam -                    Cognitive - Awake, Alert, AAO to self, place, date, year, situation     Communication - Fluent, No dysarthria, no aphasia     Cranial Nerves - CN 2-12 intact     Motor - No focal deficits                       Sensory - Intact to LT     Reflexes - DTR Intact, No primitive reflexive     Balance - WNL Static  Psychiatric - Mood stable, Affect WNL HPI per primary service(s):  Patient is a 47y old  Male who presents with a chief complaint of acute on chronic respiratory distress (2018 19:01)  47M with h/o HTN, PFO,sarcoidosis c/b aspergilosis (s/p Voriconazole course in 2017), asthma/COPD, s/p CVA w/ L hemiplegia ((2017), pAfib (not on A/C 2/2 recurrent hemoptysis), s/p emergency L upper lobectomy for hemoptysis requiring IR emoblization of L bronchial artery (2017) complicated by pneumothorax requiring chest tube, s/p tracheostomy (17) for acute hypoxic respiratory failure and PEG placement (18), broncho-pulmonary fistula, thyroid arterial pesuodaneyrusm and recent C.diff colitis (2018 s/p course of vanco/flagyl via PEG)  presenting from acute nursing facility for acute on chronic respiratory failure, tachycardia and fevers x 1 day. Of note, collateral history obtained from chart ([facility] documents) and family present at bedside. Three days prior, pt started developing low grade fevers (Tm 100.5) and developed right -sided abdominal discomfort. Today he developed respiratory distress and productive cough w/ some purulent drainage around his trach site. Flu swab done on 18 and was negative; pt however, was empirically started on Tamiflu (uncertain how many days completed). CXR on  performed reveaeld diffuse lung infiltrates; pt was empirically started on Cefepime since 18 per ID recommendations at [facility]. Pt has been on a weaning protocol since 18 and his ventilatory settings have been gradually decreased with intermittent CPAP trials. His weaning has been on hold since  in the setting of his acute illness. This morning, MD at [facility] notified that pt had developed acute respiratory distress and found to have an elevated pCO2 of 64-66. At that time, pt placed back on vent support AC with good response and decreased pCO2 to 40-47 and improvement in his tachycardia. This evening, due to ongoing respiratory symptoms, including productive cough, respiratory distress and diaphoresis, patient and family request transfer to hospital.  While in the ED, VS : T: 99.5 (rectally), HR: 116 --> 170s, BP: 133/93, RR : 16, SpO2 of 98% on AC/CMV mode. Pt recieved adenosine 12mg IV x 1 which confirmed pt to be in SVT. Recieved empirice vancomycin 1gm IV x 1 and zosyn 3.375gm IV x 1. Pt recieved 2L NS bolus. Labs on admission remarkable for CBC of 19.87 (neutrophil predominance of 86.5%), Hgb of 8.8 (baseline ~7-8), Hct of 29.5, Na of 132, bicarb of 36, serum lactate of 1.5. VB.27/92/52/35 O2 79.1. (2018 19:01)    HPI, rehab:  47M with a complex hx, has been hospitalized or in inpatient rehab for the last 6 months.  Essentially, he has sarcoid, complicated by aspergillosis.  He underwent emergent lobectomy and IR embolization in 2017.  He underwent a tracheostomy in 2017 and PGT placement in Dec 2017.  He was at [facility] for rehab, followed by [facility] for further care.  At [facility], he improved quite a bit and was reportedly at the stage that a relatively imminent return home was being concretely planned.  He had passed a swallow test and was eating foods orally.  However, he became ill and is now rehospitalized 2018 with acute on chronic respiratory failure, tachycardia and fevers.  He has since been steadily improving medically.  He is off the vent and tolerating trach collar 24 hrs/day.  He remains on IV Abx, prednisone, LMWH, oral vanco.  He is NPO, but a SLP eval is supposed to happen tomorrow, where pt and girlfriend anticipate he will pass and resume eating food again.  He has been doing PT but is limited in terms of attempts at ambulating due to his various lines, tubes, etc.  Initially he was on the vent but still able to march in place.  He is now on a trach collar and continues to march in place.  On , he was spv for txrs and marched in place with a RW x 20 steps at spv, limited from forward ambulation due to his trach collar.    REVIEW OF SYSTEMS:  Trach collar.  PGT.  No b/b issues.  On oral vanco for C-diff with contact isol prec.    PAST MEDICAL & SURGICAL HISTORY  History of pneumothorax  COPD (chronic obstructive pulmonary disease)  Asthma  Hypertension  Sarcoidosis  History of thoracotomy  No significant past surgical history    SOCIAL / FUNCTIONAL HISTORY:   Lives with aunt in [place], [place].  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  He has a large and supportive family, and very supportive girlfriend.  They both state he would have 24 hour company at home.  Totally independent at baseline.    FAMILY HISTORY   Family history of pancreatic cancer (Father)  Family history of essential hypertension (Father)    ALLERGIES  No Known Allergies    MEDICATIONS   acetaminophen    Suspension 650 milliGRAM(s) Oral every 6 hours PRN  acetaminophen    Suspension 650 milliGRAM(s) Oral every 6 hours PRN  ALBUTerol    90 MICROgram(s) HFA Inhaler 2 Puff(s) Inhalation every 6 hours  ALBUTerol    90 MICROgram(s) HFA Inhaler 2 Puff(s) Inhalation every 6 hours PRN  ampicillin  IVPB 2 Gram(s) IV Intermittent every 4 hours  buDESOnide 160 MICROgram(s)/formoterol 4.5 MICROgram(s) Inhaler 2 Puff(s) Inhalation two times a day  calamine Lotion 1 Application(s) Topical every 4 hours PRN  chlorhexidine 4% Liquid 1 Application(s) Topical daily  docusate sodium Liquid 100 milliGRAM(s) Oral two times a day  enoxaparin Injectable 40 milliGRAM(s) SubCutaneous daily  folic acid 1 milliGRAM(s) Oral daily  ipratropium 17 MICROgram(s) HFA Inhaler 2 Puff(s) Inhalation every 6 hours  lactobacillus acidophilus 1 Tablet(s) Oral four times a day with meals  LORazepam    Concentrate 1 milliGRAM(s) Oral three times a day PRN  oxyCODONE    IR 10 milliGRAM(s) Oral every 8 hours PRN  pantoprazole  Injectable 40 milliGRAM(s) IV Push daily  simethicone drops 80 milliGRAM(s) Oral four times a day PRN  vancomycin    Solution 125 milliGRAM(s) Oral two times a day    ----------------------------------------------------------------------------------------  PHYSICAL EXAM    VITALS  T(C): 37.2 (18 @ 13:38), Max: 37.2 (18 @ 13:38)  HR: 112 (18 @ 16:12) (100 - 118)  BP: 117/85 (18 @ 13:38) (106/80 - 125/93)  RR: 18 (18 @ 13:38) (16 - 20)  SpO2: 100% (18 @ 14:59) (94% - 100%)  Wt(kg): --    Constitutional - NAD, Comfortable  HEENT - NCAT, aphonic/hypophonic due to trach  Neck - Trach in place, trach collar.  Extremities - No limb edema x 4, No calf tenderness b/l  Neurologic Exam -                    Cognitive - Awake, Alert, conversant, compos mentis     Communication - Fluent, no aphasia     Motor - left hemiparesis, arm weaker than leg, but still at least 4 in L arm and 4+ in L leg.  Some intention tremors in L UEx.                     Sensory - grossly intact to LT x 4 distal limbs

## 2018-02-22 NOTE — PROGRESS NOTE ADULT - ATTENDING COMMENTS
On trach collar since yesterday morning!  feels good. abg good as well.  continue TC. even was red capped for a while, able to speak  abx through sunday  PMR dulce

## 2018-02-22 NOTE — CONSULT NOTE ADULT - ASSESSMENT
48yo M s/p 6 perc trach placement by MICU in 11/24/17 with low tidal volumes and SOB without desaturations/air leak in the setting of HCAP.  Vent without evidence of high peak pressures and scope via trach negative for obstruction - patent to mel. MICU planning to upsize to larger trach in OR.    -no acute ENT surgical intervention   -exchange of percutaneous trach per MICU management   -recommend no drain sponges or gauze underneath trach phalange as this creates malposition and increases risk for tracheal wall erosion. Mepilex dressing should be used instead.   -keep trach tie tight allowing only two fingers to slip between tie and skin   -can allow for permissive air leak as long as patient is maintaining saturations, tidal volumes, and not SOB. if need to place more air in cuff, please do so 1cc at a time and watch vent carefully   -if planning to exchange trach, recommending doing flex bronch to evaluate for tracheal wall erosions/ulceratiosn   -rest of management per MICU  -discussed with attending  -please call with questions
My initial inclination after reviewing the records and before I met the pt was to recommend return to a [facility] for a little more rehabilitation and convalescence prior to community reintegration.  Pt was close to going home from [facility] apparently, but had a rather major set back from the current illness episode.  When I proposed a return to rehab, pt was quite focused on wanting to go home directly from the hospital, without rehab.  He was frustrated at the rehabilitation facilities, particularly the "20:1" patient:nurse ratio at [facility].  I provided education about risks/benefits about returning home in the short term, including risk of a catastrophic outcome if he were not safe enough to do so, but he seemed extremely intent on "skipping" rehab.  His girlfriend was supportive of him.  They felt with home PT, lots of family, equipment, etc., he would do better at home than in a facility.  He stressed that it has been "6 months" since he's been home and wants to go back home badly... very badly.  In reviewing his records and clinical data, it does not appear completely unreasonable or outrageous that he could go home with a lot of support, i.e., directly from here without an intervening inpatient rehab program.  This cause would be significantly aided if he can: get off trach collar / tolerate capping/PMV / or even be decanulated (if pulmonary feels it is warranted of course); as well as start eating an oral diet, even if modified; and therapy trials can be completed where he is not restricted in his mobility due to his lines and tubes, i.e., if we can see that he can walk around household distances in a safe enough and reasonable manner.  We will have to see these kinds of things happen in the hospital before we could endorse such a disposition recommendation.  At this point, no specific recommendations can be made.  A [facility] or acute rehab recommendation *requires* pt willingness and buy in.  He is not willing to engage an inpt rehab option at this time, at any level.  Our team will follow.
47M with h/o HTN, PFO,sarcoidosis c/b aspergilosis (s/p Voriconazole course in 11/2017), asthma/COPD, s/p CVA w/ L hemiplegia ((9/2017), pAfib (not on A/C 2/2 recurrent hemoptysis), s/p emergency L upper lobectomy for hemoptysis requiring IR emoblization of L bronchial artery (11/2017) complicated by pneumothorax requiring chest tube, s/p tracheostomy (11/24/17) for acute hypoxic respiratory failure and PEG placement (12/6/18), broncho-pulmonary fistula, thyroid arterial pesuodaneyrusm and recent C.diff colitis (1/2018 s/p course of vanco/flagyl via PEG)  presenting from acute nursing facility for acute on chronic respiratory failure, tachycardia and fevers x 1 day. Of note, collateral history obtained from chart ([facility] documents) and family present at bedside. Three days prior, pt started developing low grade fevers (Tm 100.5) and developed right -sided abdominal discomfort with VRE bacteremia

## 2018-02-22 NOTE — PROGRESS NOTE ADULT - PROBLEM SELECTOR PLAN 1
- sepsis resolved  - 2/2 VRE bacteremia   - now afebrile  - leukocytosis resolved  - 5 days of bactrim completed  - cont ampicillin x 2 weeks (thru 2/25)  - ID note appreciated  - RVP negative  - legionella negative  - blood culture cleared since 2/11  - JONATAN done in MICU, negative  - urine culture negative, CT A/P negative for source of bacteremia  - monitor

## 2018-02-22 NOTE — CHART NOTE - NSCHARTNOTEFT_GEN_A_CORE
Source:  nursing & EMR     Diet : NPO with tube feed - Two Ben HN 55ml/hr 18 hrs ON and 6hrs OFF , + no carb Pro source 1 pack daily      Patient alert, , oriented , no edema , pt. w/ fecal incontinence and stage 2 pressure ulcers - under the tracheostomy and sacrum . Received nutrition consult on 2/20/18 for unintentional wt. loss , noted wt. loss from admission wt. , will suggest to increase EN feed .      Enteral : Current EN provides 1980 kcal & 97.7 gm protein /d .      Current Weight: - UBW - 76.2 KG ; Admit wt. - 79.3 kg ;  IBW - 78 kg ; Current wt. - 73.2 kg      Pertinent Medications: MEDICATIONS  (STANDING):    ALBUTerol    90 MICROgram(s) HFA Inhaler 2 Puff(s) Inhalation every 6 hours  ampicillin  IVPB 2 Gram(s) IV Intermittent every 4 hours  buDESOnide 160 MICROgram(s)/formoterol 4.5 MICROgram(s) Inhaler 2 Puff(s) Inhalation two times a day  chlorhexidine 4% Liquid 1 Application(s) Topical daily  docusate sodium Liquid 100 milliGRAM(s) Oral two times a day  enoxaparin Injectable 40 milliGRAM(s) SubCutaneous daily  folic acid 1 milliGRAM(s) Oral daily  ipratropium 17 MICROgram(s) HFA Inhaler 2 Puff(s) Inhalation every 6 hours  lactobacillus acidophilus 1 Tablet(s) Oral four times a day with meals  pantoprazole  Injectable 40 milliGRAM(s) IV Push daily  vancomycin    Solution 125 milliGRAM(s) Oral two times a day    MEDICATIONS  (PRN):  acetaminophen    Suspension 650 milliGRAM(s) Oral every 6 hours PRN For Temp greater than 38 C (100.4 F)  acetaminophen    Suspension 650 milliGRAM(s) Oral every 6 hours PRN mild and moderate and severe pain  ALBUTerol    90 MICROgram(s) HFA Inhaler 2 Puff(s) Inhalation every 6 hours PRN Bronchospasm  calamine Lotion 1 Application(s) Topical every 4 hours PRN Rash and/or Itching  LORazepam    Concentrate 1 milliGRAM(s) Oral three times a day PRN Anxiety  oxyCODONE    IR 10 milliGRAM(s) Oral every 8 hours PRN Moderate Pain (4 - 6)  simethicone drops 80 milliGRAM(s) Oral four times a day PRN Gas      Estimated Needs:  1950 - 2340 kcal/d & 94 - 109 gm/d -  no change since previous assessment    Recommend Two Ben HN @ 65 ml/hr 18 hrs ON with No carb Pro source 1 pack daily = 2340 kcal & 112 gm protein/d     Monitoring and Evaluation:   1. Tolerance to diet prescription   2. weights   3. follow up per protocol

## 2018-02-22 NOTE — PROGRESS NOTE ADULT - PROBLEM SELECTOR PLAN 2
- now improved s/p trach changed to #8 XLT  - now tolerating trach collar 24/7   - ABG good  - cont trach collar as tolerated  - cont to wean as tolerated  - will trial PMV also as tolerated  - chest PT  - trach care daily  - suction prn

## 2018-02-22 NOTE — PROGRESS NOTE ADULT - SUBJECTIVE AND OBJECTIVE BOX
CHIEF COMPLAINT: Patient is a 47y old  Male who presents with a chief complaint of acute on chronic respiratory distress (09 Feb 2018 19:01)    Interval Events:      REVIEW OF SYSTEMS:  Constitutional:   Eyes:  ENT:  CV:  Resp:  GI:  :  MSK:  Integumentary:  Neurological:  Psychiatric:  Endocrine:  Hematologic/Lymphatic:  Allergic/Immunologic:  [ ] All other systems negative  [ ] Unable to assess ROS because ________      OBJECTIVE:  ICU Vital Signs Last 24 Hrs  T(C): 35.8 (22 Feb 2018 06:14), Max: 37.1 (21 Feb 2018 13:20)  T(F): 96.5 (22 Feb 2018 06:14), Max: 98.7 (21 Feb 2018 13:20)  HR: 103 (22 Feb 2018 06:46) (102 - 121)  BP: 106/80 (22 Feb 2018 06:14) (101/84 - 125/93)  BP(mean): --  ABP: --  ABP(mean): --  RR: 16 (22 Feb 2018 06:47) (16 - 21)  SpO2: 100% (22 Feb 2018 06:47) (94% - 100%)    Mode: standby    02-21 @ 07:01  -  02-22 @ 07:00  --------------------------------------------------------  IN: 932 mL / OUT: 950 mL / NET: -18 mL      HOSPITAL MEDICATIONS:  MEDICATIONS  (STANDING):  ALBUTerol    90 MICROgram(s) HFA Inhaler 2 Puff(s) Inhalation every 6 hours  ampicillin  IVPB 2 Gram(s) IV Intermittent every 4 hours  buDESOnide 160 MICROgram(s)/formoterol 4.5 MICROgram(s) Inhaler 2 Puff(s) Inhalation two times a day  chlorhexidine 4% Liquid 1 Application(s) Topical daily  docusate sodium Liquid 10 milliGRAM(s) Oral daily  enoxaparin Injectable 40 milliGRAM(s) SubCutaneous daily  folic acid 1 milliGRAM(s) Oral daily  ipratropium 17 MICROgram(s) HFA Inhaler 2 Puff(s) Inhalation every 6 hours  lactobacillus acidophilus 1 Tablet(s) Oral four times a day with meals  methylPREDNISolone sodium succinate Injectable 30 milliGRAM(s) IV Push daily  pantoprazole  Injectable 40 milliGRAM(s) IV Push daily  vancomycin    Solution 125 milliGRAM(s) Oral two times a day    MEDICATIONS  (PRN):  acetaminophen    Suspension 650 milliGRAM(s) Oral every 6 hours PRN For Temp greater than 38 C (100.4 F)  acetaminophen    Suspension 650 milliGRAM(s) Oral every 6 hours PRN mild and moderate and severe pain  ALBUTerol    90 MICROgram(s) HFA Inhaler 2 Puff(s) Inhalation every 6 hours PRN Bronchospasm  calamine Lotion 1 Application(s) Topical every 4 hours PRN Rash and/or Itching  LORazepam    Concentrate 1 milliGRAM(s) Oral three times a day PRN Anxiety  oxyCODONE    IR 10 milliGRAM(s) Oral every 8 hours PRN Moderate Pain (4 - 6)  simethicone drops 80 milliGRAM(s) Oral four times a day PRN Gas      LABS:              Arterial Blood Gas:  02-22 @ 05:26  7.40/57/144/33/99.2/10.0  ABG lactate: --        MICROBIOLOGY:     RADIOLOGY:  [ ] Reviewed and interpreted by me    PULMONARY FUNCTION TESTS:    EKG: CHIEF COMPLAINT: Patient is a 47y old  Male who presents with a chief complaint of acute on chronic respiratory distress (09 Feb 2018 19:01)    Interval Events: none overnight, tolerated trach collar overnight with good ABG      REVIEW OF SYSTEMS:  Constitutional: feels good, no complaints, good spirits  CV: denies  Resp: denies  GI: denies  [x] All other systems negative  [ ] Unable to assess ROS because ________      OBJECTIVE:  ICU Vital Signs Last 24 Hrs  T(C): 35.8 (22 Feb 2018 06:14), Max: 37.1 (21 Feb 2018 13:20)  T(F): 96.5 (22 Feb 2018 06:14), Max: 98.7 (21 Feb 2018 13:20)  HR: 103 (22 Feb 2018 06:46) (102 - 121)  BP: 106/80 (22 Feb 2018 06:14) (101/84 - 125/93)  BP(mean): --  ABP: --  ABP(mean): --  RR: 16 (22 Feb 2018 06:47) (16 - 21)  SpO2: 100% (22 Feb 2018 06:47) (94% - 100%)    Mode: standby    02-21 @ 07:01  -  02-22 @ 07:00  --------------------------------------------------------  IN: 932 mL / OUT: 950 mL / NET: -18 mL      HOSPITAL MEDICATIONS:  MEDICATIONS  (STANDING):  ALBUTerol    90 MICROgram(s) HFA Inhaler 2 Puff(s) Inhalation every 6 hours  ampicillin  IVPB 2 Gram(s) IV Intermittent every 4 hours  buDESOnide 160 MICROgram(s)/formoterol 4.5 MICROgram(s) Inhaler 2 Puff(s) Inhalation two times a day  chlorhexidine 4% Liquid 1 Application(s) Topical daily  docusate sodium Liquid 10 milliGRAM(s) Oral daily  enoxaparin Injectable 40 milliGRAM(s) SubCutaneous daily  folic acid 1 milliGRAM(s) Oral daily  ipratropium 17 MICROgram(s) HFA Inhaler 2 Puff(s) Inhalation every 6 hours  lactobacillus acidophilus 1 Tablet(s) Oral four times a day with meals  methylPREDNISolone sodium succinate Injectable 30 milliGRAM(s) IV Push daily  pantoprazole  Injectable 40 milliGRAM(s) IV Push daily  vancomycin    Solution 125 milliGRAM(s) Oral two times a day    MEDICATIONS  (PRN):  acetaminophen    Suspension 650 milliGRAM(s) Oral every 6 hours PRN For Temp greater than 38 C (100.4 F)  acetaminophen    Suspension 650 milliGRAM(s) Oral every 6 hours PRN mild and moderate and severe pain  ALBUTerol    90 MICROgram(s) HFA Inhaler 2 Puff(s) Inhalation every 6 hours PRN Bronchospasm  calamine Lotion 1 Application(s) Topical every 4 hours PRN Rash and/or Itching  LORazepam    Concentrate 1 milliGRAM(s) Oral three times a day PRN Anxiety  oxyCODONE    IR 10 milliGRAM(s) Oral every 8 hours PRN Moderate Pain (4 - 6)  simethicone drops 80 milliGRAM(s) Oral four times a day PRN Gas      LABS:    Arterial Blood Gas:  02-22 @ 05:26  7.40/57/144/33/99.2/10.0

## 2018-02-23 PROCEDURE — 99233 SBSQ HOSP IP/OBS HIGH 50: CPT | Mod: GC

## 2018-02-23 RX ORDER — PSYLLIUM SEED (WITH DEXTROSE)
1 POWDER (GRAM) ORAL DAILY
Qty: 0 | Refills: 0 | Status: DISCONTINUED | OUTPATIENT
Start: 2018-02-23 | End: 2018-02-27

## 2018-02-23 RX ORDER — OXYCODONE HYDROCHLORIDE 5 MG/1
10 TABLET ORAL EVERY 8 HOURS
Qty: 0 | Refills: 0 | Status: DISCONTINUED | OUTPATIENT
Start: 2018-02-23 | End: 2018-02-27

## 2018-02-23 RX ADMIN — BUDESONIDE AND FORMOTEROL FUMARATE DIHYDRATE 2 PUFF(S): 160; 4.5 AEROSOL RESPIRATORY (INHALATION) at 09:27

## 2018-02-23 RX ADMIN — Medication 216 GRAM(S): at 20:47

## 2018-02-23 RX ADMIN — Medication 1 MILLIGRAM(S): at 08:54

## 2018-02-23 RX ADMIN — Medication 1 TABLET(S): at 08:48

## 2018-02-23 RX ADMIN — Medication 1 MILLIGRAM(S): at 11:49

## 2018-02-23 RX ADMIN — Medication 2 PUFF(S): at 09:28

## 2018-02-23 RX ADMIN — Medication 125 MILLIGRAM(S): at 07:00

## 2018-02-23 RX ADMIN — Medication 216 GRAM(S): at 23:59

## 2018-02-23 RX ADMIN — CHLORHEXIDINE GLUCONATE 1 APPLICATION(S): 213 SOLUTION TOPICAL at 11:50

## 2018-02-23 RX ADMIN — Medication 2 PUFF(S): at 05:25

## 2018-02-23 RX ADMIN — BUDESONIDE AND FORMOTEROL FUMARATE DIHYDRATE 2 PUFF(S): 160; 4.5 AEROSOL RESPIRATORY (INHALATION) at 21:00

## 2018-02-23 RX ADMIN — Medication 2 PUFF(S): at 21:00

## 2018-02-23 RX ADMIN — Medication 216 GRAM(S): at 11:49

## 2018-02-23 RX ADMIN — PANTOPRAZOLE SODIUM 40 MILLIGRAM(S): 20 TABLET, DELAYED RELEASE ORAL at 11:48

## 2018-02-23 RX ADMIN — Medication 2 PUFF(S): at 15:18

## 2018-02-23 RX ADMIN — Medication 1 MILLIGRAM(S): at 21:06

## 2018-02-23 RX ADMIN — Medication 1 TABLET(S): at 16:59

## 2018-02-23 RX ADMIN — Medication 216 GRAM(S): at 15:33

## 2018-02-23 RX ADMIN — Medication 20 MILLIGRAM(S): at 07:00

## 2018-02-23 RX ADMIN — ENOXAPARIN SODIUM 40 MILLIGRAM(S): 100 INJECTION SUBCUTANEOUS at 11:49

## 2018-02-23 RX ADMIN — Medication 1 TABLET(S): at 11:49

## 2018-02-23 RX ADMIN — Medication 1 PACKET(S): at 11:49

## 2018-02-23 RX ADMIN — Medication 1 MILLIGRAM(S): at 15:33

## 2018-02-23 RX ADMIN — Medication 125 MILLIGRAM(S): at 16:59

## 2018-02-23 RX ADMIN — Medication 216 GRAM(S): at 04:52

## 2018-02-23 RX ADMIN — Medication 216 GRAM(S): at 08:49

## 2018-02-23 NOTE — SWALLOW BEDSIDE ASSESSMENT ADULT - ASR SWALLOW ASPIRATION MONITOR
pneumonia/oral hygiene/position upright (89Y)/gurgly voice/throat clearing/upper respiratory infection/change of breathing pattern/cough/fever

## 2018-02-23 NOTE — PROGRESS NOTE ADULT - SUBJECTIVE AND OBJECTIVE BOX
CHIEF COMPLAINT:    Interval Events:    REVIEW OF SYSTEMS:  Constitutional:   Eyes:  ENT:  CV:  Resp:  GI:  :  MSK:  Integumentary:  Neurological:  Psychiatric:  Endocrine:  Hematologic/Lymphatic:  Allergic/Immunologic:  [ ] All other systems negative  [ ] Unable to assess ROS because ________    OBJECTIVE:  ICU Vital Signs Last 24 Hrs  T(C): 36.2 (23 Feb 2018 04:50), Max: 37.2 (22 Feb 2018 13:38)  T(F): 97.2 (23 Feb 2018 04:50), Max: 98.9 (22 Feb 2018 13:38)  HR: 113 (23 Feb 2018 06:50) (100 - 117)  BP: 131/87 (23 Feb 2018 04:50) (117/85 - 133/95)  BP(mean): --  ABP: --  ABP(mean): --  RR: 16 (23 Feb 2018 06:51) (16 - 18)  SpO2: 95% (23 Feb 2018 06:51) (95% - 100%)    Mode: standby, FiO2: 40    02-22 @ 07:01  -  02-23 @ 07:00  --------------------------------------------------------  IN: 600 mL / OUT: 800 mL / NET: -200 mL      CAPILLARY BLOOD GLUCOSE          PHYSICAL EXAM:  General:   HEENT:   Lymph Nodes:  Neck:   Respiratory:   Cardiovascular:   Abdomen:   Extremities:   Skin:   Neurological:  Psychiatry:    HOSPITAL MEDICATIONS:  MEDICATIONS  (STANDING):  ALBUTerol    90 MICROgram(s) HFA Inhaler 2 Puff(s) Inhalation every 6 hours  ampicillin  IVPB 2 Gram(s) IV Intermittent every 4 hours  buDESOnide 160 MICROgram(s)/formoterol 4.5 MICROgram(s) Inhaler 2 Puff(s) Inhalation two times a day  chlorhexidine 4% Liquid 1 Application(s) Topical daily  docusate sodium Liquid 100 milliGRAM(s) Oral two times a day  enoxaparin Injectable 40 milliGRAM(s) SubCutaneous daily  folic acid 1 milliGRAM(s) Oral daily  ipratropium 17 MICROgram(s) HFA Inhaler 2 Puff(s) Inhalation every 6 hours  lactobacillus acidophilus 1 Tablet(s) Oral four times a day with meals  pantoprazole  Injectable 40 milliGRAM(s) IV Push daily  predniSONE   Tablet 20 milliGRAM(s) Oral daily  vancomycin    Solution 125 milliGRAM(s) Oral two times a day    MEDICATIONS  (PRN):  acetaminophen    Suspension 650 milliGRAM(s) Oral every 6 hours PRN For Temp greater than 38 C (100.4 F)  acetaminophen    Suspension 650 milliGRAM(s) Oral every 6 hours PRN mild and moderate and severe pain  ALBUTerol    90 MICROgram(s) HFA Inhaler 2 Puff(s) Inhalation every 6 hours PRN Bronchospasm  calamine Lotion 1 Application(s) Topical every 4 hours PRN Rash and/or Itching  LORazepam    Concentrate 1 milliGRAM(s) Oral three times a day PRN Anxiety  oxyCODONE    IR 10 milliGRAM(s) Oral every 8 hours PRN Moderate Pain (4 - 6)  simethicone drops 80 milliGRAM(s) Oral four times a day PRN Gas      LABS:              Arterial Blood Gas:  02-22 @ 05:26  7.40/57/144/33/99.2/10.0  ABG lactate: --        MICROBIOLOGY:     RADIOLOGY:  [ ] Reviewed and interpreted by me    PULMONARY FUNCTION TESTS:    EKG: CHIEF COMPLAINT: Patient is a 47y old  Male who presents with a chief complaint of acute on chronic respiratory distress (09 Feb 2018 19:01)      Interval Events: None    REVIEW OF SYSTEMS:  Constitutional: Feeling better   CV: denies   Resp: denies   Allergic/Immunologic:  [x ] All other systems negative  OBJECTIVE:  ICU Vital Signs Last 24 Hrs  T(C): 36.2 (23 Feb 2018 04:50), Max: 37.2 (22 Feb 2018 13:38)  T(F): 97.2 (23 Feb 2018 04:50), Max: 98.9 (22 Feb 2018 13:38)  HR: 113 (23 Feb 2018 06:50) (100 - 117)  BP: 131/87 (23 Feb 2018 04:50) (117/85 - 133/95)  BP(mean): --  ABP: --  ABP(mean): --  RR: 16 (23 Feb 2018 06:51) (16 - 18)  SpO2: 95% (23 Feb 2018 06:51) (95% - 100%)    Mode: standby, FiO2: 40    02-22 @ 07:01  -  02-23 @ 07:00  --------------------------------------------------------  IN: 600 mL / OUT: 800 mL / NET: -200 mL      CAPILLARY BLOOD GLUCOSE            HOSPITAL MEDICATIONS:  MEDICATIONS  (STANDING):  ALBUTerol    90 MICROgram(s) HFA Inhaler 2 Puff(s) Inhalation every 6 hours  ampicillin  IVPB 2 Gram(s) IV Intermittent every 4 hours  buDESOnide 160 MICROgram(s)/formoterol 4.5 MICROgram(s) Inhaler 2 Puff(s) Inhalation two times a day  chlorhexidine 4% Liquid 1 Application(s) Topical daily  docusate sodium Liquid 100 milliGRAM(s) Oral two times a day  enoxaparin Injectable 40 milliGRAM(s) SubCutaneous daily  folic acid 1 milliGRAM(s) Oral daily  ipratropium 17 MICROgram(s) HFA Inhaler 2 Puff(s) Inhalation every 6 hours  lactobacillus acidophilus 1 Tablet(s) Oral four times a day with meals  pantoprazole  Injectable 40 milliGRAM(s) IV Push daily  predniSONE   Tablet 20 milliGRAM(s) Oral daily  vancomycin    Solution 125 milliGRAM(s) Oral two times a day    MEDICATIONS  (PRN):  acetaminophen    Suspension 650 milliGRAM(s) Oral every 6 hours PRN For Temp greater than 38 C (100.4 F)  acetaminophen    Suspension 650 milliGRAM(s) Oral every 6 hours PRN mild and moderate and severe pain  ALBUTerol    90 MICROgram(s) HFA Inhaler 2 Puff(s) Inhalation every 6 hours PRN Bronchospasm  calamine Lotion 1 Application(s) Topical every 4 hours PRN Rash and/or Itching  LORazepam    Concentrate 1 milliGRAM(s) Oral three times a day PRN Anxiety  oxyCODONE    IR 10 milliGRAM(s) Oral every 8 hours PRN Moderate Pain (4 - 6)  simethicone drops 80 milliGRAM(s) Oral four times a day PRN Gas      LABS:              Arterial Blood Gas:  02-22 @ 05:26  7.40/57/144/33/99.2/10.0  ABG lactate: --        MICROBIOLOGY:     RADIOLOGY:  [ ] Reviewed and interpreted by me    PULMONARY FUNCTION TESTS:    EKG:

## 2018-02-23 NOTE — PROGRESS NOTE ADULT - ATTENDING COMMENTS
Continues to be stable on trach collar  feeling well.  abx through Sunday  refusing acute rehab, wants to go home  if remains off the vent through Monday, plan to downsize to cuffless trach, d/c planning to home  trach care teaching

## 2018-02-23 NOTE — SWALLOW BEDSIDE ASSESSMENT ADULT - ADDITIONAL RECOMMENDATIONS
RN advised to repeat suctioning 15 min and 1 hour post today's evaluation and to contact this service at pager #[phone] as well as medical team if any evidence of green-tinged return is noted.

## 2018-02-23 NOTE — SWALLOW BEDSIDE ASSESSMENT ADULT - SWALLOW EVAL: RECOMMENDED FEEDING/EATING TECHNIQUES
oral hygiene/allow for swallow between intakes/alternate food with liquid/hard swallow w/ each bite or sip/position upright (90 degrees)/small sips/bites/crush medication (when feasible)/maintain upright posture during/after eating for 30 mins/no straws

## 2018-02-23 NOTE — SWALLOW BEDSIDE ASSESSMENT ADULT - COMMENTS
Pt was alert and cooperative for a clinical assessment of swallow function this PM with trach in place (8.0XLT, per chart). Family and RN present at bedside. Per charting, pt is a 47M with complex PMHx significant for HTN, PFO,sarcoidosis c/b aspergillosis asthma/COPD, s/p CVA w/ L hemiplegia ((9/2017), pAfib (not on A/C 2/2 recurrent hemoptysis), s/p emergency L upper lobectomy for hemoptysis requiring IR embolization of L bronchial artery (11/2017) , s/p tracheostomy (11/24/17) and PEG placement (12/6/18) now returns from [facility] w/ acute on chronic respiratory failure, tachycardia and fevers x 1 week. Recent CXR revealed "Status post left lung surgery. Large rounded lucency overlying the left mid to upper hemithorax is noted as on the prior study and corresponding to the abnormality seen on the chest CT of December 25, 2017. Bilateral linear opacities are noted probably atelectasis or scarring again as correlated with the prior CT. Unchanged diffuse bilateral emphysematous changes with   bulla formations. Trace right pleural effusion. "    SaO2 levels noted to remain between 96%-100% throughout today's assessment. Pt able to produce audible voicing upon digital occlusion of trach site.

## 2018-02-24 LAB
BUN SERPL-MCNC: 23 MG/DL — SIGNIFICANT CHANGE UP (ref 7–23)
CALCIUM SERPL-MCNC: 10.2 MG/DL — SIGNIFICANT CHANGE UP (ref 8.4–10.5)
CHLORIDE SERPL-SCNC: 95 MMOL/L — LOW (ref 98–107)
CO2 SERPL-SCNC: 36 MMOL/L — HIGH (ref 22–31)
CREAT SERPL-MCNC: 0.83 MG/DL — SIGNIFICANT CHANGE UP (ref 0.5–1.3)
GLUCOSE SERPL-MCNC: 95 MG/DL — SIGNIFICANT CHANGE UP (ref 70–99)
HCT VFR BLD CALC: 36.4 % — LOW (ref 39–50)
HGB BLD-MCNC: 11.6 G/DL — LOW (ref 13–17)
MCHC RBC-ENTMCNC: 29 PG — SIGNIFICANT CHANGE UP (ref 27–34)
MCHC RBC-ENTMCNC: 31.9 % — LOW (ref 32–36)
MCV RBC AUTO: 91 FL — SIGNIFICANT CHANGE UP (ref 80–100)
NRBC # FLD: 0 — SIGNIFICANT CHANGE UP
PLATELET # BLD AUTO: 260 K/UL — SIGNIFICANT CHANGE UP (ref 150–400)
PMV BLD: 12.7 FL — SIGNIFICANT CHANGE UP (ref 7–13)
POTASSIUM SERPL-MCNC: 4.3 MMOL/L — SIGNIFICANT CHANGE UP (ref 3.5–5.3)
POTASSIUM SERPL-SCNC: 4.3 MMOL/L — SIGNIFICANT CHANGE UP (ref 3.5–5.3)
RBC # BLD: 4 M/UL — LOW (ref 4.2–5.8)
RBC # FLD: 15.3 % — HIGH (ref 10.3–14.5)
SODIUM SERPL-SCNC: 141 MMOL/L — SIGNIFICANT CHANGE UP (ref 135–145)
WBC # BLD: 12.33 K/UL — HIGH (ref 3.8–10.5)
WBC # FLD AUTO: 12.33 K/UL — HIGH (ref 3.8–10.5)

## 2018-02-24 PROCEDURE — 71250 CT THORAX DX C-: CPT | Mod: 26

## 2018-02-24 PROCEDURE — 99233 SBSQ HOSP IP/OBS HIGH 50: CPT | Mod: GC

## 2018-02-24 RX ORDER — PANTOPRAZOLE SODIUM 20 MG/1
40 TABLET, DELAYED RELEASE ORAL DAILY
Qty: 0 | Refills: 0 | Status: DISCONTINUED | OUTPATIENT
Start: 2018-02-24 | End: 2018-02-27

## 2018-02-24 RX ORDER — IPRATROPIUM/ALBUTEROL SULFATE 18-103MCG
3 AEROSOL WITH ADAPTER (GRAM) INHALATION EVERY 6 HOURS
Qty: 0 | Refills: 0 | Status: DISCONTINUED | OUTPATIENT
Start: 2018-02-24 | End: 2018-02-27

## 2018-02-24 RX ADMIN — Medication 125 MILLIGRAM(S): at 06:14

## 2018-02-24 RX ADMIN — BUDESONIDE AND FORMOTEROL FUMARATE DIHYDRATE 2 PUFF(S): 160; 4.5 AEROSOL RESPIRATORY (INHALATION) at 09:58

## 2018-02-24 RX ADMIN — Medication 160 MILLIGRAM(S): at 20:36

## 2018-02-24 RX ADMIN — Medication 216 GRAM(S): at 23:31

## 2018-02-24 RX ADMIN — Medication 1 TABLET(S): at 17:16

## 2018-02-24 RX ADMIN — Medication 3 MILLILITER(S): at 09:58

## 2018-02-24 RX ADMIN — Medication 1 TABLET(S): at 21:27

## 2018-02-24 RX ADMIN — Medication 1 PACKET(S): at 12:05

## 2018-02-24 RX ADMIN — Medication 3 MILLILITER(S): at 15:28

## 2018-02-24 RX ADMIN — Medication 216 GRAM(S): at 20:35

## 2018-02-24 RX ADMIN — Medication 125 MILLIGRAM(S): at 17:16

## 2018-02-24 RX ADMIN — Medication 1 MILLIGRAM(S): at 12:04

## 2018-02-24 RX ADMIN — Medication 2 PUFF(S): at 03:15

## 2018-02-24 RX ADMIN — Medication 1 TABLET(S): at 12:06

## 2018-02-24 RX ADMIN — Medication 216 GRAM(S): at 16:35

## 2018-02-24 RX ADMIN — Medication 1 TABLET(S): at 08:42

## 2018-02-24 RX ADMIN — Medication 216 GRAM(S): at 08:34

## 2018-02-24 RX ADMIN — Medication 160 MILLIGRAM(S): at 12:13

## 2018-02-24 RX ADMIN — Medication 216 GRAM(S): at 11:57

## 2018-02-24 RX ADMIN — Medication 20 MILLIGRAM(S): at 06:14

## 2018-02-24 RX ADMIN — PANTOPRAZOLE SODIUM 40 MILLIGRAM(S): 20 TABLET, DELAYED RELEASE ORAL at 12:06

## 2018-02-24 RX ADMIN — Medication 216 GRAM(S): at 04:48

## 2018-02-24 RX ADMIN — Medication 3 MILLILITER(S): at 21:49

## 2018-02-24 RX ADMIN — CHLORHEXIDINE GLUCONATE 1 APPLICATION(S): 213 SOLUTION TOPICAL at 11:57

## 2018-02-24 RX ADMIN — BUDESONIDE AND FORMOTEROL FUMARATE DIHYDRATE 2 PUFF(S): 160; 4.5 AEROSOL RESPIRATORY (INHALATION) at 22:12

## 2018-02-24 RX ADMIN — Medication 650 MILLIGRAM(S): at 20:35

## 2018-02-24 RX ADMIN — ENOXAPARIN SODIUM 40 MILLIGRAM(S): 100 INJECTION SUBCUTANEOUS at 12:04

## 2018-02-24 NOTE — PROGRESS NOTE ADULT - ATTENDING COMMENTS
end stage sarcoidosis  Continues to be stable on trach collar  feeling well.  abx through Sunday  refusing acute rehab, wants to go home  if remains off the vent through Monday, plan to downsize to cuffless trach, d/c planning to home  trach care teaching

## 2018-02-24 NOTE — PROGRESS NOTE ADULT - SUBJECTIVE AND OBJECTIVE BOX
CHIEF COMPLAINT:    Interval Events:    REVIEW OF SYSTEMS:  Constitutional:   Eyes:  ENT:  CV:  Resp:  GI:  :  MSK:  Integumentary:  Neurological:  Psychiatric:  Endocrine:  Hematologic/Lymphatic:  Allergic/Immunologic:  [ ] All other systems negative  [ ] Unable to assess ROS because ________    OBJECTIVE:  ICU Vital Signs Last 24 Hrs  T(C): 36.1 (24 Feb 2018 06:15), Max: 36.6 (23 Feb 2018 21:39)  T(F): 97 (24 Feb 2018 06:15), Max: 97.9 (23 Feb 2018 21:39)  HR: 127 (24 Feb 2018 10:24) (110 - 127)  BP: 132/94 (24 Feb 2018 06:15) (110/78 - 132/94)  BP(mean): --  ABP: --  ABP(mean): --  RR: 20 (24 Feb 2018 07:36) (18 - 20)  SpO2: 99% (24 Feb 2018 10:24) (95% - 100%)    Mode: standby    02-23 @ 07:01  -  02-24 @ 07:00  --------------------------------------------------------  IN: 0 mL / OUT: 900 mL / NET: -900 mL      CAPILLARY BLOOD GLUCOSE          PHYSICAL EXAM:  General:   HEENT:   Lymph Nodes:  Neck:   Respiratory:   Cardiovascular:   Abdomen:   Extremities:   Skin:   Neurological:  Psychiatry:    HOSPITAL MEDICATIONS:  MEDICATIONS  (STANDING):  ALBUTerol    90 MICROgram(s) HFA Inhaler 2 Puff(s) Inhalation every 6 hours  ALBUTerol/ipratropium for Nebulization 3 milliLiter(s) Nebulizer every 6 hours  ampicillin  IVPB 2 Gram(s) IV Intermittent every 4 hours  buDESOnide 160 MICROgram(s)/formoterol 4.5 MICROgram(s) Inhaler 2 Puff(s) Inhalation two times a day  chlorhexidine 4% Liquid 1 Application(s) Topical daily  docusate sodium Liquid 100 milliGRAM(s) Oral two times a day  enoxaparin Injectable 40 milliGRAM(s) SubCutaneous daily  folic acid 1 milliGRAM(s) Oral daily  ipratropium 17 MICROgram(s) HFA Inhaler 2 Puff(s) Inhalation every 6 hours  lactobacillus acidophilus 1 Tablet(s) Oral four times a day with meals  pantoprazole  Injectable 40 milliGRAM(s) IV Push daily  predniSONE   Tablet 20 milliGRAM(s) Oral daily  psyllium Powder 1 Packet(s) Oral daily  trimethoprim  40 mG/sulfamethoxazole 200 mG Suspension 160 milliGRAM(s) Oral every 12 hours  vancomycin    Solution 125 milliGRAM(s) Oral two times a day    MEDICATIONS  (PRN):  acetaminophen    Suspension 650 milliGRAM(s) Oral every 6 hours PRN For Temp greater than 38 C (100.4 F)  acetaminophen    Suspension 650 milliGRAM(s) Oral every 6 hours PRN mild and moderate and severe pain  ALBUTerol    90 MICROgram(s) HFA Inhaler 2 Puff(s) Inhalation every 6 hours PRN Bronchospasm  calamine Lotion 1 Application(s) Topical every 4 hours PRN Rash and/or Itching  LORazepam    Concentrate 1 milliGRAM(s) Oral three times a day PRN Anxiety  oxyCODONE    IR 10 milliGRAM(s) Oral every 8 hours PRN Moderate Pain (4 - 6)  simethicone drops 80 milliGRAM(s) Oral four times a day PRN Gas      LABS:                        11.6   12.33 )-----------( 260      ( 24 Feb 2018 05:41 )             36.4     02-24    141  |  95<L>  |  23  ----------------------------<  95  4.3   |  36<H>  |  0.83    Ca    10.2      24 Feb 2018 05:41                MICROBIOLOGY:     RADIOLOGY:  [ ] Reviewed and interpreted by me    PULMONARY FUNCTION TESTS:    EKG: CHIEF COMPLAINT: Patient is a 47y old  Male who presents with a chief complaint of acute on chronic respiratory distress (09 Feb 2018 19:01)    Interval Events: Increased work of breathing and sob this morning    REVIEW OF SYSTEMS:  Constitutional: Feels ok  Eyes: Denies difficulty  ENT: Denies difficulty  CV: Denies CP  Resp: Says the nebulizer works better than the puffs.  GI: Denies GI distress  : Denies difficulty  MSK: Left arm remains weak  Integumentary:  Neurological:  Psychiatric:  Endocrine:  Hematologic/Lymphatic:  Allergic/Immunologic:  [x] All other systems negative  [ ] Unable to assess ROS because ________    OBJECTIVE:  ICU Vital Signs Last 24 Hrs  T(C): 36.1 (24 Feb 2018 06:15), Max: 36.6 (23 Feb 2018 21:39)  T(F): 97 (24 Feb 2018 06:15), Max: 97.9 (23 Feb 2018 21:39)  HR: 127 (24 Feb 2018 10:24) (110 - 127)  BP: 132/94 (24 Feb 2018 06:15) (110/78 - 132/94)  BP(mean): --  ABP: --  ABP(mean): --  RR: 20 (24 Feb 2018 07:36) (18 - 20)  SpO2: 99% (24 Feb 2018 10:24) (95% - 100%)    Mode: standby    02-23 @ 07:01  -  02-24 @ 07:00  --------------------------------------------------------  IN: 0 mL / OUT: 900 mL / NET: -900 mL      CAPILLARY BLOOD GLUCOSE    HOSPITAL MEDICATIONS:  MEDICATIONS  (STANDING):  ALBUTerol    90 MICROgram(s) HFA Inhaler 2 Puff(s) Inhalation every 6 hours  ALBUTerol/ipratropium for Nebulization 3 milliLiter(s) Nebulizer every 6 hours  ampicillin  IVPB 2 Gram(s) IV Intermittent every 4 hours  buDESOnide 160 MICROgram(s)/formoterol 4.5 MICROgram(s) Inhaler 2 Puff(s) Inhalation two times a day  chlorhexidine 4% Liquid 1 Application(s) Topical daily  docusate sodium Liquid 100 milliGRAM(s) Oral two times a day  enoxaparin Injectable 40 milliGRAM(s) SubCutaneous daily  folic acid 1 milliGRAM(s) Oral daily  ipratropium 17 MICROgram(s) HFA Inhaler 2 Puff(s) Inhalation every 6 hours  lactobacillus acidophilus 1 Tablet(s) Oral four times a day with meals  pantoprazole  Injectable 40 milliGRAM(s) IV Push daily  predniSONE   Tablet 20 milliGRAM(s) Oral daily  psyllium Powder 1 Packet(s) Oral daily  trimethoprim  40 mG/sulfamethoxazole 200 mG Suspension 160 milliGRAM(s) Oral every 12 hours  vancomycin    Solution 125 milliGRAM(s) Oral two times a day    MEDICATIONS  (PRN):  acetaminophen    Suspension 650 milliGRAM(s) Oral every 6 hours PRN For Temp greater than 38 C (100.4 F)  acetaminophen    Suspension 650 milliGRAM(s) Oral every 6 hours PRN mild and moderate and severe pain  ALBUTerol    90 MICROgram(s) HFA Inhaler 2 Puff(s) Inhalation every 6 hours PRN Bronchospasm  calamine Lotion 1 Application(s) Topical every 4 hours PRN Rash and/or Itching  LORazepam    Concentrate 1 milliGRAM(s) Oral three times a day PRN Anxiety  oxyCODONE    IR 10 milliGRAM(s) Oral every 8 hours PRN Moderate Pain (4 - 6)  simethicone drops 80 milliGRAM(s) Oral four times a day PRN Gas      LABS:                        11.6   12.33 )-----------( 260      ( 24 Feb 2018 05:41 )             36.4     02-24    141  |  95<L>  |  23  ----------------------------<  95  4.3   |  36<H>  |  0.83    Ca    10.2      24 Feb 2018 05:41                MICROBIOLOGY:     RADIOLOGY:  [ ] Reviewed and interpreted by me    PULMONARY FUNCTION TESTS:    EKG:

## 2018-02-24 NOTE — PROGRESS NOTE ADULT - PROBLEM SELECTOR PLAN 1
- sepsis resolved  - VRE bacteremia   - afebrile  - leukocytosis resolved  - bactrim restarted 2/2 increased WOB and rhonchorous breath sounds and inc WBC  - Will repeat chest CT  - cont ampicillin x 2 weeks (thru 2/25)  - ID note appreciated  - RVP negative  - legionella negative  - blood culture cleared since 2/11  - JONATAN done in MICU, negative  - urine culture negative, CT A/P negative for source of bacteremia  - monitor

## 2018-02-25 ENCOUNTER — TRANSCRIPTION ENCOUNTER (OUTPATIENT)
Age: 48
End: 2018-02-25

## 2018-02-25 LAB
BUN SERPL-MCNC: 21 MG/DL — SIGNIFICANT CHANGE UP (ref 7–23)
CALCIUM SERPL-MCNC: 9.9 MG/DL — SIGNIFICANT CHANGE UP (ref 8.4–10.5)
CHLORIDE SERPL-SCNC: 94 MMOL/L — LOW (ref 98–107)
CO2 SERPL-SCNC: 30 MMOL/L — SIGNIFICANT CHANGE UP (ref 22–31)
CREAT SERPL-MCNC: 1.16 MG/DL — SIGNIFICANT CHANGE UP (ref 0.5–1.3)
GLUCOSE SERPL-MCNC: 96 MG/DL — SIGNIFICANT CHANGE UP (ref 70–99)
HCT VFR BLD CALC: 34.3 % — LOW (ref 39–50)
HGB BLD-MCNC: 11.1 G/DL — LOW (ref 13–17)
MCHC RBC-ENTMCNC: 29 PG — SIGNIFICANT CHANGE UP (ref 27–34)
MCHC RBC-ENTMCNC: 32.4 % — SIGNIFICANT CHANGE UP (ref 32–36)
MCV RBC AUTO: 89.6 FL — SIGNIFICANT CHANGE UP (ref 80–100)
NRBC # FLD: 0 — SIGNIFICANT CHANGE UP
PLATELET # BLD AUTO: 216 K/UL — SIGNIFICANT CHANGE UP (ref 150–400)
PMV BLD: 12.6 FL — SIGNIFICANT CHANGE UP (ref 7–13)
POTASSIUM SERPL-MCNC: 4.1 MMOL/L — SIGNIFICANT CHANGE UP (ref 3.5–5.3)
POTASSIUM SERPL-SCNC: 4.1 MMOL/L — SIGNIFICANT CHANGE UP (ref 3.5–5.3)
RBC # BLD: 3.83 M/UL — LOW (ref 4.2–5.8)
RBC # FLD: 15.7 % — HIGH (ref 10.3–14.5)
SODIUM SERPL-SCNC: 138 MMOL/L — SIGNIFICANT CHANGE UP (ref 135–145)
WBC # BLD: 10.29 K/UL — SIGNIFICANT CHANGE UP (ref 3.8–10.5)
WBC # FLD AUTO: 10.29 K/UL — SIGNIFICANT CHANGE UP (ref 3.8–10.5)

## 2018-02-25 PROCEDURE — 99233 SBSQ HOSP IP/OBS HIGH 50: CPT | Mod: GC

## 2018-02-25 RX ADMIN — Medication 125 MILLIGRAM(S): at 17:19

## 2018-02-25 RX ADMIN — BUDESONIDE AND FORMOTEROL FUMARATE DIHYDRATE 2 PUFF(S): 160; 4.5 AEROSOL RESPIRATORY (INHALATION) at 21:53

## 2018-02-25 RX ADMIN — Medication 1 MILLIGRAM(S): at 12:42

## 2018-02-25 RX ADMIN — Medication 216 GRAM(S): at 08:47

## 2018-02-25 RX ADMIN — PANTOPRAZOLE SODIUM 40 MILLIGRAM(S): 20 TABLET, DELAYED RELEASE ORAL at 12:42

## 2018-02-25 RX ADMIN — Medication 3 MILLILITER(S): at 03:29

## 2018-02-25 RX ADMIN — Medication 1 TABLET(S): at 21:32

## 2018-02-25 RX ADMIN — Medication 216 GRAM(S): at 04:58

## 2018-02-25 RX ADMIN — Medication 3 MILLILITER(S): at 21:22

## 2018-02-25 RX ADMIN — Medication 1 TABLET(S): at 08:47

## 2018-02-25 RX ADMIN — Medication 216 GRAM(S): at 12:44

## 2018-02-25 RX ADMIN — Medication 1 MILLIGRAM(S): at 14:02

## 2018-02-25 RX ADMIN — Medication 20 MILLIGRAM(S): at 05:13

## 2018-02-25 RX ADMIN — BUDESONIDE AND FORMOTEROL FUMARATE DIHYDRATE 2 PUFF(S): 160; 4.5 AEROSOL RESPIRATORY (INHALATION) at 09:56

## 2018-02-25 RX ADMIN — Medication 160 MILLIGRAM(S): at 05:14

## 2018-02-25 RX ADMIN — Medication 1 TABLET(S): at 17:19

## 2018-02-25 RX ADMIN — Medication 1 PACKET(S): at 12:42

## 2018-02-25 RX ADMIN — Medication 216 GRAM(S): at 23:11

## 2018-02-25 RX ADMIN — Medication 216 GRAM(S): at 16:19

## 2018-02-25 RX ADMIN — ENOXAPARIN SODIUM 40 MILLIGRAM(S): 100 INJECTION SUBCUTANEOUS at 12:44

## 2018-02-25 RX ADMIN — CHLORHEXIDINE GLUCONATE 1 APPLICATION(S): 213 SOLUTION TOPICAL at 12:44

## 2018-02-25 RX ADMIN — Medication 216 GRAM(S): at 20:54

## 2018-02-25 RX ADMIN — Medication 1 MILLIGRAM(S): at 21:31

## 2018-02-25 RX ADMIN — Medication 3 MILLILITER(S): at 15:29

## 2018-02-25 RX ADMIN — Medication 125 MILLIGRAM(S): at 05:12

## 2018-02-25 RX ADMIN — Medication 1 TABLET(S): at 12:42

## 2018-02-25 RX ADMIN — Medication 3 MILLILITER(S): at 09:56

## 2018-02-25 NOTE — DISCHARGE NOTE ADULT - CARE PLAN
Principal Discharge DX:	VRE bacteremia  Goal:	Resolution  Assessment and plan of treatment:	Completed a 2 week course of ampicillin  Secondary Diagnosis:	SVT (supraventricular tachycardia)  Goal:	Resolved  Secondary Diagnosis:	Sarcoidosis  Goal:	Maintain optimal respiratory status  Assessment and plan of treatment:	-Continue with Symbicort twice daily. Use duoneb nebulizer every 6 hours.---------  -Prednisone?????????????????  Secondary Diagnosis:	Respiratory distress  Goal:	Maintain optimal respiratory status  Assessment and plan of treatment:	-Continue with daily trach care  -Suction as needed Principal Discharge DX:	VRE bacteremia  Goal:	Resolution  Assessment and plan of treatment:	Completed a 2 week course of ampicillin  Secondary Diagnosis:	SVT (supraventricular tachycardia)  Goal:	Resolved  Assessment and plan of treatment:	Follow up with cardiology as needed  Secondary Diagnosis:	Sarcoidosis  Goal:	Maintain optimal respiratory status  Assessment and plan of treatment:	-Continue with Symbicort twice daily. Use duoneb nebulizer every 6 hours.---------  - proventil inhaler as needed  - continue with prednisione  Secondary Diagnosis:	Respiratory distress  Goal:	Maintain optimal respiratory status  Assessment and plan of treatment:	-Continue with daily trach care  -Suction as needed

## 2018-02-25 NOTE — DISCHARGE NOTE ADULT - INSTRUCTIONS
Dysphagia 1, Pureed diet with honey consistency fluids Dysphagia 1, Pureed diet with honey consistency fluids  Flush peg tube daily with 60cc water continue trach care as we discussed once a day using the kit provided. suction as we practiced as needed. use humidified oxygen to keep moist.   keep your head of bed elevated in sitting position when eating and drinking to prevent aspiration. take small bites/sips as we discussed.  make sure to flush your peg once a day so it doesn't clog.

## 2018-02-25 NOTE — DISCHARGE NOTE ADULT - PATIENT PORTAL LINK FT
You can access the [facility] Patient Portal, offered by [facility], by registering with the following website: [email or web address]

## 2018-02-25 NOTE — PROGRESS NOTE ADULT - GASTROINTESTINAL COMMENTS
PEG present
PEG with enteral feeds
PEG present
PEG present
PEG with enteral feeds
PEG present
PEG in place. Eating a dysphagia diet.
PEG with enteral feeds
PEG in place. Tolerating dysphagia diet.

## 2018-02-25 NOTE — DISCHARGE NOTE ADULT - REASON FOR ADMISSION
Patient is a 47y old  Male who presents with a chief complaint of acute on chronic respiratory distress (09 Feb 2018 19:01)

## 2018-02-25 NOTE — DISCHARGE NOTE ADULT - [id number]_GEN_ALL_CORE
observation and assessment/teaching and training/medication teaching and assessment/other:/rehabilitation services

## 2018-02-25 NOTE — PROGRESS NOTE ADULT - SUBJECTIVE AND OBJECTIVE BOX
CHIEF COMPLAINT:    Interval Events:    REVIEW OF SYSTEMS:  Constitutional:   Eyes:  ENT:  CV:  Resp:  GI:  :  MSK:  Integumentary:  Neurological:  Psychiatric:  Endocrine:  Hematologic/Lymphatic:  Allergic/Immunologic:  [ ] All other systems negative  [ ] Unable to assess ROS because ________    OBJECTIVE:  ICU Vital Signs Last 24 Hrs  T(C): 36.2 (25 Feb 2018 05:07), Max: 36.8 (24 Feb 2018 21:25)  T(F): 97.2 (25 Feb 2018 05:07), Max: 98.2 (24 Feb 2018 21:25)  HR: 108 (25 Feb 2018 06:41) (104 - 127)  BP: 105/84 (25 Feb 2018 05:07) (105/84 - 110/78)  BP(mean): --  ABP: --  ABP(mean): --  RR: 20 (25 Feb 2018 06:42) (20 - 24)  SpO2: 98% (25 Feb 2018 06:42) (98% - 99%)    Mode: standby    02-24 @ 07:01  -  02-25 @ 07:00  --------------------------------------------------------  IN: 300 mL / OUT: 900 mL / NET: -600 mL      CAPILLARY BLOOD GLUCOSE          PHYSICAL EXAM:  General:   HEENT:   Lymph Nodes:  Neck:   Respiratory:   Cardiovascular:   Abdomen:   Extremities:   Skin:   Neurological:  Psychiatry:    HOSPITAL MEDICATIONS:  MEDICATIONS  (STANDING):  ALBUTerol    90 MICROgram(s) HFA Inhaler 2 Puff(s) Inhalation every 6 hours  ALBUTerol/ipratropium for Nebulization 3 milliLiter(s) Nebulizer every 6 hours  ampicillin  IVPB 2 Gram(s) IV Intermittent every 4 hours  buDESOnide 160 MICROgram(s)/formoterol 4.5 MICROgram(s) Inhaler 2 Puff(s) Inhalation two times a day  chlorhexidine 4% Liquid 1 Application(s) Topical daily  docusate sodium Liquid 100 milliGRAM(s) Oral two times a day  enoxaparin Injectable 40 milliGRAM(s) SubCutaneous daily  folic acid 1 milliGRAM(s) Oral daily  ipratropium 17 MICROgram(s) HFA Inhaler 2 Puff(s) Inhalation every 6 hours  lactobacillus acidophilus 1 Tablet(s) Oral four times a day with meals  pantoprazole   Suspension 40 milliGRAM(s) Oral daily  predniSONE Concentrate 20 milliGRAM(s) Oral daily  psyllium Powder 1 Packet(s) Oral daily  trimethoprim  40 mG/sulfamethoxazole 200 mG Suspension 160 milliGRAM(s) Oral every 12 hours  vancomycin    Solution 125 milliGRAM(s) Oral two times a day    MEDICATIONS  (PRN):  acetaminophen    Suspension 650 milliGRAM(s) Oral every 6 hours PRN For Temp greater than 38 C (100.4 F)  acetaminophen    Suspension 650 milliGRAM(s) Oral every 6 hours PRN mild and moderate and severe pain  ALBUTerol    90 MICROgram(s) HFA Inhaler 2 Puff(s) Inhalation every 6 hours PRN Bronchospasm  calamine Lotion 1 Application(s) Topical every 4 hours PRN Rash and/or Itching  LORazepam    Concentrate 1 milliGRAM(s) Oral three times a day PRN Anxiety  oxyCODONE    IR 10 milliGRAM(s) Oral every 8 hours PRN Moderate Pain (4 - 6)  simethicone drops 80 milliGRAM(s) Oral four times a day PRN Gas      LABS:                        11.1   10.29 )-----------( 216      ( 25 Feb 2018 07:09 )             34.3     02-25    138  |  94<L>  |  21  ----------------------------<  96  4.1   |  30  |  1.16    Ca    9.9      25 Feb 2018 07:09                MICROBIOLOGY:     RADIOLOGY:  [ ] Reviewed and interpreted by me    PULMONARY FUNCTION TESTS:    EKG: CHIEF COMPLAINT:    Interval Events:    REVIEW OF SYSTEMS:  Constitutional:   Eyes:  ENT:  CV:  Resp:  GI:  :  MSK:  Integumentary:  Neurological:  Psychiatric:  Endocrine:  Hematologic/Lymphatic:  Allergic/Immunologic:  [ ] All other systems negative  [ ] Unable to assess ROS because ________    OBJECTIVE:  ICU Vital Signs Last 24 Hrs  T(C): 36.2 (25 Feb 2018 05:07), Max: 36.8 (24 Feb 2018 21:25)  T(F): 97.2 (25 Feb 2018 05:07), Max: 98.2 (24 Feb 2018 21:25)  HR: 108 (25 Feb 2018 06:41) (104 - 127)  BP: 105/84 (25 Feb 2018 05:07) (105/84 - 110/78)  BP(mean): --  ABP: --  ABP(mean): --  RR: 20 (25 Feb 2018 06:42) (20 - 24)  SpO2: 98% (25 Feb 2018 06:42) (98% - 99%)    Mode: standby    02-24 @ 07:01  -  02-25 @ 07:00  --------------------------------------------------------  IN: 300 mL / OUT: 900 mL / NET: -600 mL      CAPILLARY BLOOD GLUCOSE          PHYSICAL EXAM:  General:   HEENT:   Lymph Nodes:  Neck:   Respiratory:   Cardiovascular:   Abdomen:   Extremities:   Skin:   Neurological:  Psychiatry:    HOSPITAL MEDICATIONS:  MEDICATIONS  (STANDING):  ALBUTerol    90 MICROgram(s) HFA Inhaler 2 Puff(s) Inhalation every 6 hours  ALBUTerol/ipratropium for Nebulization 3 milliLiter(s) Nebulizer every 6 hours  ampicillin  IVPB 2 Gram(s) IV Intermittent every 4 hours  buDESOnide 160 MICROgram(s)/formoterol 4.5 MICROgram(s) Inhaler 2 Puff(s) Inhalation two times a day  chlorhexidine 4% Liquid 1 Application(s) Topical daily  docusate sodium Liquid 100 milliGRAM(s) Oral two times a day  enoxaparin Injectable 40 milliGRAM(s) SubCutaneous daily  folic acid 1 milliGRAM(s) Oral daily  ipratropium 17 MICROgram(s) HFA Inhaler 2 Puff(s) Inhalation every 6 hours  lactobacillus acidophilus 1 Tablet(s) Oral four times a day with meals  pantoprazole   Suspension 40 milliGRAM(s) Oral daily  predniSONE Concentrate 20 milliGRAM(s) Oral daily  psyllium Powder 1 Packet(s) Oral daily  trimethoprim  40 mG/sulfamethoxazole 200 mG Suspension 160 milliGRAM(s) Oral every 12 hours  vancomycin    Solution 125 milliGRAM(s) Oral two times a day    MEDICATIONS  (PRN):  acetaminophen    Suspension 650 milliGRAM(s) Oral every 6 hours PRN For Temp greater than 38 C (100.4 F)  acetaminophen    Suspension 650 milliGRAM(s) Oral every 6 hours PRN mild and moderate and severe pain  ALBUTerol    90 MICROgram(s) HFA Inhaler 2 Puff(s) Inhalation every 6 hours PRN Bronchospasm  calamine Lotion 1 Application(s) Topical every 4 hours PRN Rash and/or Itching  LORazepam    Concentrate 1 milliGRAM(s) Oral three times a day PRN Anxiety  oxyCODONE    IR 10 milliGRAM(s) Oral every 8 hours PRN Moderate Pain (4 - 6)  simethicone drops 80 milliGRAM(s) Oral four < from: CT Chest No Cont (02.24.18 @ 12:50) >          CT CHEST   IMPRESSION:   Status post left upper lobectomy. Stable pulmonary architectural   distortion, consistent with history of sarcoidosis  Large air in the left upper pleural space is again noted with interval   resolution of herniation through the left lateral chest wall.    < end of copied text >  times a day PRN Gas      LABS:                        11.1   10.29 )-----------( 216      ( 25 Feb 2018 07:09 )             34.3     02-25    138  |  94<L>  |  21  ----------------------------<  96  4.1   |  30  |  1.16    Ca    9.9      25 Feb 2018 07:09                MICROBIOLOGY:     RADIOLOGY:  [ ] Reviewed and interpreted by me    PULMONARY FUNCTION TESTS:    EKG: CHIEF COMPLAINT: Patient is a 47y old  Male who presents with a chief complaint of acute on chronic respiratory distress (09 Feb 2018 19:01)    Interval Events: No acute events overnight    OBJECTIVE:  ICU Vital Signs Last 24 Hrs  T(C): 36.2 (25 Feb 2018 05:07), Max: 36.8 (24 Feb 2018 21:25)  T(F): 97.2 (25 Feb 2018 05:07), Max: 98.2 (24 Feb 2018 21:25)  HR: 108 (25 Feb 2018 06:41) (104 - 127)  BP: 105/84 (25 Feb 2018 05:07) (105/84 - 110/78)  BP(mean): --  ABP: --  ABP(mean): --  RR: 20 (25 Feb 2018 06:42) (20 - 24)  SpO2: 98% (25 Feb 2018 06:42) (98% - 99%)    Mode: standby    02-24 @ 07:01  -  02-25 @ 07:00  --------------------------------------------------------  IN: 300 mL / OUT: 900 mL / NET: -600 mL      HOSPITAL MEDICATIONS:  MEDICATIONS  (STANDING):  ALBUTerol    90 MICROgram(s) HFA Inhaler 2 Puff(s) Inhalation every 6 hours  ALBUTerol/ipratropium for Nebulization 3 milliLiter(s) Nebulizer every 6 hours  ampicillin  IVPB 2 Gram(s) IV Intermittent every 4 hours  buDESOnide 160 MICROgram(s)/formoterol 4.5 MICROgram(s) Inhaler 2 Puff(s) Inhalation two times a day  chlorhexidine 4% Liquid 1 Application(s) Topical daily  docusate sodium Liquid 100 milliGRAM(s) Oral two times a day  enoxaparin Injectable 40 milliGRAM(s) SubCutaneous daily  folic acid 1 milliGRAM(s) Oral daily  ipratropium 17 MICROgram(s) HFA Inhaler 2 Puff(s) Inhalation every 6 hours  lactobacillus acidophilus 1 Tablet(s) Oral four times a day with meals  pantoprazole   Suspension 40 milliGRAM(s) Oral daily  predniSONE Concentrate 20 milliGRAM(s) Oral daily  psyllium Powder 1 Packet(s) Oral daily  trimethoprim  40 mG/sulfamethoxazole 200 mG Suspension 160 milliGRAM(s) Oral every 12 hours  vancomycin    Solution 125 milliGRAM(s) Oral two times a day    MEDICATIONS  (PRN):  acetaminophen    Suspension 650 milliGRAM(s) Oral every 6 hours PRN For Temp greater than 38 C (100.4 F)  acetaminophen    Suspension 650 milliGRAM(s) Oral every 6 hours PRN mild and moderate and severe pain  ALBUTerol    90 MICROgram(s) HFA Inhaler 2 Puff(s) Inhalation every 6 hours PRN Bronchospasm  calamine Lotion 1 Application(s) Topical every 4 hours PRN Rash and/or Itching  LORazepam    Concentrate 1 milliGRAM(s) Oral three times a day PRN Anxiety  oxyCODONE    IR 10 milliGRAM(s) Oral every 8 hours PRN Moderate Pain (4 - 6)  simethicone drops 80 milliGRAM(s) Oral four < from: CT Chest No Cont (02.24.18 @ 12:50) >          CT CHEST   IMPRESSION:   Status post left upper lobectomy. Stable pulmonary architectural   distortion, consistent with history of sarcoidosis  Large air in the left upper pleural space is again noted with interval   resolution of herniation through the left lateral chest wall.    < end of copied text >  times a day PRN Gas      LABS:                        11.1   10.29 )-----------( 216      ( 25 Feb 2018 07:09 )             34.3     02-25    138  |  94<L>  |  21  ----------------------------<  96  4.1   |  30  |  1.16    Ca    9.9      25 Feb 2018 07:09                MICROBIOLOGY:     RADIOLOGY:  [ ] Reviewed and interpreted by me    PULMONARY FUNCTION TESTS:    EKG:

## 2018-02-25 NOTE — PROGRESS NOTE ADULT - PROBLEM SELECTOR PLAN 1
- sepsis resolved  - VRE bacteremia   - afebrile  - leukocytosis resolved  - bactrim completed  - complete ampicillin tonight  - ID note appreciated  - RVP negative  - legionella negative  - blood culture cleared since 2/11  - JONATAN done in MICU, negative  - urine culture negative, CT A/P negative for source of bacteremia  - monitor

## 2018-02-25 NOTE — PROGRESS NOTE ADULT - ATTENDING COMMENTS
end stage sarcoidosis  Continues to be stable on trach collar  feeling well.  complete the course of abx through Sunday  refusing acute rehab, wants to go home  if remains off the vent through Monday, plan to downsize to cuffless trach, d/c planning to home  trach care teaching.

## 2018-02-25 NOTE — DISCHARGE NOTE ADULT - HOME CARE AGENCY
to arrange the Visit time. [facility] [phone]: For Visiting Nurse Service, Home PT, Reddy for Aide, AME cardona: The 1st RN Visit is for Wed 2/28/18.

## 2018-02-25 NOTE — DISCHARGE NOTE ADULT - DURABLE MEDICAL EQUIPMENT AGENCY
[facility] [phone]: A Home Oxygen Concentrator; Compressor for Humidity; PORTABLE E Tanks; Suction Machine; Trach Care Supplies; Home Nebulizer; Hospital Bed; Gel Overlay; Bedside Commode; Shower Chair; Rollator Walker will be delivered to your home 2/26/2018. The Respiratory Therapist from [facility] will meet you at your Home to Instruct use of equipment once you arrive Home.

## 2018-02-25 NOTE — DISCHARGE NOTE ADULT - MEDICATION SUMMARY - MEDICATIONS TO STOP TAKING
I will STOP taking the medications listed below when I get home from the hospital:    Chloraseptic 1.4% topical liquid  -- Apply on skin to affected area 4 times a day, As Needed sore throat/cough    Lovenox 40 mg/0.4 mL injectable solution  -- 40 unit(s) subcutaneous once a day

## 2018-02-25 NOTE — DISCHARGE NOTE ADULT - HOSPITAL COURSE
47M with complex PMHx significant for HTN, PFO,sarcoidosis c/b aspergillosis asthma/COPD, s/p CVA w/ L hemiplegia ((9/2017), pAfib (not on A/C 2/2 recurrent hemoptysis), s/p emergency L upper lobectomy for hemoptysis requiring IR embolization of L bronchial artery (11/2017) , s/p tracheostomy (11/24/17) and PEG placement (12/6/18) now returns from [facility] w/ acute on chronic respiratory failure, tachycardia and fevers x 1 week.  Sepsis:    - ID following   - Dapto  / Vanco ---> ampicillin, stop Datpto finsihed ampicillin until 2/25/18    - RVP -neg   - Legionella - neg   - CT A/P -preliminary result -   Partially visualized patchy opacities in the right lower lobe      may represent pneumonia. Small right pleural effusion, decreased from prior exam.        Bilateral nonobstructive renal calculi.   - Pt will recieve emperic po vanco for hx of c-diff for 5 days post discharge   -continue bacid     Acute hypercapneic respriatory distress possibly 2/2 COPD:    - c/w AC/CMV vent mode for now   - duonebs ATC    - Solucorteff as ordered   -  antibiotic coverage   - frequent suctioning and chest PT.   Pt was able to weaned off ventilator and tolerating trach collar and PMV .  Family was taught trach care and suctioning     HCAP (healthcare-associated pneumonia).     - CXR: R middle lobe opacity    - ID following   - Antibiotics given course of ampicillin until 2/25/18    - Legionell and sputum - negatative     Chronic pain.     - c/w Fentanyl patch 12mcg/72hr (has been gradually tapered down at [facility])   -  c/w oxycodone 10mg PO q8h PRN for breakthrough pain    - bowel regimen.

## 2018-02-25 NOTE — DISCHARGE NOTE ADULT - MEDICATION SUMMARY - MEDICATIONS TO TAKE
I will START or STAY ON the medications listed below when I get home from the hospital:    predniSONE 10 mg oral tablet  -- 2 tab(s) by mouth once a day VIA peg  -- Indication: For COPD (chronic obstructive pulmonary disease)    acetaminophen 160 mg/5 mL oral suspension  -- 20.31 milliliter(s) by gastrostomy tube every 6 hours, As Needed  -- Indication: For pain    fentaNYL 12 mcg/hr transdermal film, extended release  -- 1 patch by transdermal patch every 72 hours MDD:1 patch every 3 day s  -- Indication: For pain    oxyCODONE 10 mg oral tablet  -- 1 tab(s) by mouth every 8 hours, As Needed for pain via PEG MDD:3 tabs  -- Indication: For pain    Ativan 1 mg oral tablet  -- 1 tab(s) by mouth 3 times a day via PEG, As Needed for anxiety MDD:3 tabs  -- Indication: For Anxiety    albuterol 90 mcg/inh inhalation aerosol  -- 2 puff(s) inhaled every 6 hours, As needed, Bronchospasm  -- Indication: For COPD (chronic obstructive pulmonary disease)    budesonide-formoterol 160 mcg-4.5 mcg/inh inhalation aerosol  -- 2 puff(s) inhaled every 6 hours   -- Indication: For COPD (chronic obstructive pulmonary disease)    ipratropium-albuterol 0.5 mg-2.5 mg/3 mLinhalation solution  -- 3 milliliter(s) inhaled every 6 hours  -- Indication: For COPD (chronic obstructive pulmonary disease)    calamine topical lotion  -- 1 application on skin every 4 hours, As needed, Rash and/or Itching  -- Indication: For Rash    vancomycin 125 mg oral capsule  -- 1 cap(s) by mouth 2 times a day   -- Finish all this medication unless otherwise directed by prescriber.    -- Indication: For C-diff     ferrous sulfate 300 mg/5 mL (60 mg elemental iron) oral liquid  -- 5 milliliter(s) by mouth once a day via PEG  -- Indication: For Anemia     psyllium 3.4 g/7 g oral powder for reconstitution  --  by mouth   -- Indication: For COnstipation     docusate sodium 10 mg/mL oral liquid  -- 10 milliliter(s) by mouth 2 times a day  -- Indication: For COnstipation     simethicone 40 mg/0.6 mL oral liquid  -- 1.2 milliliter(s) by mouth 4 times a day, As needed, Gas  -- Indication: For gas    lactobacillus acidophilus oral tablet  -- 1 tab(s) by mouth 4 times a day via PEG  -- Indication: For diarhea     omeprazole 2 mg/mL oral suspension  -- 10 milliliter(s) by mouth once a day  via PEG   -- Indication: For gerd    folic acid 1 mg oral tablet  -- 1 tab(s) by mouth once a day via PEG  -- Indication: For Anemia

## 2018-02-25 NOTE — DISCHARGE NOTE ADULT - PLAN OF CARE
Resolution Completed a 2 week course of ampicillin Resolved Maintain optimal respiratory status -Continue with Symbicort twice daily. Use duoneb nebulizer every 6 hours.---------  -Prednisone????????????????? -Continue with daily trach care  -Suction as needed Follow up with cardiology as needed -Continue with Symbicort twice daily. Use duoneb nebulizer every 6 hours.---------  - proventil inhaler as needed  - continue with prednisione

## 2018-02-26 LAB
BUN SERPL-MCNC: 17 MG/DL — SIGNIFICANT CHANGE UP (ref 7–23)
CALCIUM SERPL-MCNC: 9.6 MG/DL — SIGNIFICANT CHANGE UP (ref 8.4–10.5)
CHLORIDE SERPL-SCNC: 97 MMOL/L — LOW (ref 98–107)
CO2 SERPL-SCNC: 28 MMOL/L — SIGNIFICANT CHANGE UP (ref 22–31)
CREAT SERPL-MCNC: 1.14 MG/DL — SIGNIFICANT CHANGE UP (ref 0.5–1.3)
GLUCOSE SERPL-MCNC: 108 MG/DL — HIGH (ref 70–99)
POTASSIUM SERPL-MCNC: 4 MMOL/L — SIGNIFICANT CHANGE UP (ref 3.5–5.3)
POTASSIUM SERPL-SCNC: 4 MMOL/L — SIGNIFICANT CHANGE UP (ref 3.5–5.3)
SODIUM SERPL-SCNC: 140 MMOL/L — SIGNIFICANT CHANGE UP (ref 135–145)

## 2018-02-26 PROCEDURE — 99233 SBSQ HOSP IP/OBS HIGH 50: CPT | Mod: GC

## 2018-02-26 RX ADMIN — Medication 1 TABLET(S): at 12:23

## 2018-02-26 RX ADMIN — Medication 1 PACKET(S): at 12:24

## 2018-02-26 RX ADMIN — Medication 20 MILLIGRAM(S): at 05:35

## 2018-02-26 RX ADMIN — Medication 1 MILLIGRAM(S): at 12:24

## 2018-02-26 RX ADMIN — Medication 100 MILLIGRAM(S): at 17:33

## 2018-02-26 RX ADMIN — Medication 3 MILLILITER(S): at 16:00

## 2018-02-26 RX ADMIN — PANTOPRAZOLE SODIUM 40 MILLIGRAM(S): 20 TABLET, DELAYED RELEASE ORAL at 12:24

## 2018-02-26 RX ADMIN — BUDESONIDE AND FORMOTEROL FUMARATE DIHYDRATE 2 PUFF(S): 160; 4.5 AEROSOL RESPIRATORY (INHALATION) at 09:40

## 2018-02-26 RX ADMIN — Medication 3 MILLILITER(S): at 09:40

## 2018-02-26 RX ADMIN — Medication 1 TABLET(S): at 21:23

## 2018-02-26 RX ADMIN — Medication 1 MILLIGRAM(S): at 17:34

## 2018-02-26 RX ADMIN — Medication 3 MILLILITER(S): at 22:40

## 2018-02-26 RX ADMIN — Medication 3 MILLILITER(S): at 03:35

## 2018-02-26 RX ADMIN — ENOXAPARIN SODIUM 40 MILLIGRAM(S): 100 INJECTION SUBCUTANEOUS at 12:23

## 2018-02-26 RX ADMIN — Medication 1 TABLET(S): at 09:43

## 2018-02-26 RX ADMIN — Medication 1 TABLET(S): at 17:33

## 2018-02-26 RX ADMIN — OXYCODONE HYDROCHLORIDE 10 MILLIGRAM(S): 5 TABLET ORAL at 11:34

## 2018-02-26 RX ADMIN — Medication 125 MILLIGRAM(S): at 05:35

## 2018-02-26 RX ADMIN — CHLORHEXIDINE GLUCONATE 1 APPLICATION(S): 213 SOLUTION TOPICAL at 12:25

## 2018-02-26 RX ADMIN — Medication 125 MILLIGRAM(S): at 17:33

## 2018-02-26 RX ADMIN — BUDESONIDE AND FORMOTEROL FUMARATE DIHYDRATE 2 PUFF(S): 160; 4.5 AEROSOL RESPIRATORY (INHALATION) at 23:03

## 2018-02-26 RX ADMIN — Medication 1 MILLIGRAM(S): at 21:25

## 2018-02-26 RX ADMIN — OXYCODONE HYDROCHLORIDE 10 MILLIGRAM(S): 5 TABLET ORAL at 12:04

## 2018-02-26 NOTE — PROGRESS NOTE ADULT - PROBLEM SELECTOR PLAN 1
- sepsis resolved  - VRE bacteremia   - afebrile  - leukocytosis resolved  - bactrim completed  - completed ampicillin   - ID note appreciated  - RVP negative  - legionella negative  - blood culture cleared since 2/11  - JONATAN done in MICU, negative  - urine culture negative, CT A/P negative for source of bacteremia  - monitor

## 2018-02-26 NOTE — PROGRESS NOTE ADULT - SUBJECTIVE AND OBJECTIVE BOX
CHIEF COMPLAINT:    Interval Events:    REVIEW OF SYSTEMS:  Constitutional:   Eyes:  ENT:  CV:  Resp:  GI:  :  MSK:  Integumentary:  Neurological:  Psychiatric:  Endocrine:  Hematologic/Lymphatic:  Allergic/Immunologic:  [ ] All other systems negative  [ ] Unable to assess ROS because ________    OBJECTIVE:  ICU Vital Signs Last 24 Hrs  T(C): 36.7 (26 Feb 2018 05:25), Max: 36.9 (25 Feb 2018 14:16)  T(F): 98 (26 Feb 2018 05:25), Max: 98.4 (25 Feb 2018 14:16)  HR: 114 (26 Feb 2018 06:39) (18 - 125)  BP: 99/73 (26 Feb 2018 05:25) (99/73 - 103/84)  BP(mean): --  ABP: --  ABP(mean): --  RR: 18 (26 Feb 2018 06:40) (18 - 20)  SpO2: 98% (26 Feb 2018 06:40) (97% - 100%)    Mode: standby    02-25 @ 07:01  -  02-26 @ 07:00  --------------------------------------------------------  IN: 330 mL / OUT: 500 mL / NET: -170 mL      CAPILLARY BLOOD GLUCOSE          PHYSICAL EXAM:  General:   HEENT:   Lymph Nodes:  Neck:   Respiratory:   Cardiovascular:   Abdomen:   Extremities:   Skin:   Neurological:  Psychiatry:    HOSPITAL MEDICATIONS:  MEDICATIONS  (STANDING):  ALBUTerol    90 MICROgram(s) HFA Inhaler 2 Puff(s) Inhalation every 6 hours  ALBUTerol/ipratropium for Nebulization 3 milliLiter(s) Nebulizer every 6 hours  buDESOnide 160 MICROgram(s)/formoterol 4.5 MICROgram(s) Inhaler 2 Puff(s) Inhalation two times a day  chlorhexidine 4% Liquid 1 Application(s) Topical daily  docusate sodium Liquid 100 milliGRAM(s) Oral two times a day  enoxaparin Injectable 40 milliGRAM(s) SubCutaneous daily  folic acid 1 milliGRAM(s) Oral daily  lactobacillus acidophilus 1 Tablet(s) Oral four times a day with meals  pantoprazole   Suspension 40 milliGRAM(s) Oral daily  predniSONE Concentrate 20 milliGRAM(s) Oral daily  psyllium Powder 1 Packet(s) Oral daily  vancomycin    Solution 125 milliGRAM(s) Oral two times a day    MEDICATIONS  (PRN):  acetaminophen    Suspension 650 milliGRAM(s) Oral every 6 hours PRN For Temp greater than 38 C (100.4 F)  acetaminophen    Suspension 650 milliGRAM(s) Oral every 6 hours PRN mild and moderate and severe pain  ALBUTerol    90 MICROgram(s) HFA Inhaler 2 Puff(s) Inhalation every 6 hours PRN Bronchospasm  calamine Lotion 1 Application(s) Topical every 4 hours PRN Rash and/or Itching  LORazepam    Concentrate 1 milliGRAM(s) Oral three times a day PRN Anxiety  oxyCODONE    IR 10 milliGRAM(s) Oral every 8 hours PRN Moderate Pain (4 - 6)  simethicone drops 80 milliGRAM(s) Oral four times a day PRN Gas      LABS:                        11.1   10.29 )-----------( 216      ( 25 Feb 2018 07:09 )             34.3     02-26    140  |  97<L>  |  17  ----------------------------<  108<H>  4.0   |  28  |  1.14    Ca    9.6      26 Feb 2018 05:25                MICROBIOLOGY:     RADIOLOGY:  [ ] Reviewed and interpreted by me    PULMONARY FUNCTION TESTS:    EKG: CHIEF COMPLAINT: Patient is a 47y old  Male who presents with a chief complaint of Patient is a 47y old  Male who presents with a chief complaint of acute on chronic respiratory distress (09 Feb 2018 19:01) (25 Feb 2018 18:04)      Interval Events: Trach change done     REVIEW OF SYSTEMS:  Constitutional: No fever or chills   Eyes:  ENT:  CV: Denies   Resp: Denies   GI: Denies   Psychiatric: + anxiety   Endocrine:  Hematologic/Lymphatic:  Allergic/Immunologic:  [x ] All other systems negative  [ ] Unable to assess ROS because ________    OBJECTIVE:  ICU Vital Signs Last 24 Hrs  T(C): 36.7 (26 Feb 2018 05:25), Max: 36.9 (25 Feb 2018 14:16)  T(F): 98 (26 Feb 2018 05:25), Max: 98.4 (25 Feb 2018 14:16)  HR: 114 (26 Feb 2018 06:39) (18 - 125)  BP: 99/73 (26 Feb 2018 05:25) (99/73 - 103/84)  BP(mean): --  ABP: --  ABP(mean): --  RR: 18 (26 Feb 2018 06:40) (18 - 20)  SpO2: 98% (26 Feb 2018 06:40) (97% - 100%)    Mode: standby    02-25 @ 07:01  -  02-26 @ 07:00  --------------------------------------------------------  IN: 330 mL / OUT: 500 mL / NET: -170 mL      CAPILLARY BLOOD GLUCOSE    HOSPITAL MEDICATIONS:  MEDICATIONS  (STANDING):  ALBUTerol    90 MICROgram(s) HFA Inhaler 2 Puff(s) Inhalation every 6 hours  ALBUTerol/ipratropium for Nebulization 3 milliLiter(s) Nebulizer every 6 hours  buDESOnide 160 MICROgram(s)/formoterol 4.5 MICROgram(s) Inhaler 2 Puff(s) Inhalation two times a day  chlorhexidine 4% Liquid 1 Application(s) Topical daily  docusate sodium Liquid 100 milliGRAM(s) Oral two times a day  enoxaparin Injectable 40 milliGRAM(s) SubCutaneous daily  folic acid 1 milliGRAM(s) Oral daily  lactobacillus acidophilus 1 Tablet(s) Oral four times a day with meals  pantoprazole   Suspension 40 milliGRAM(s) Oral daily  predniSONE Concentrate 20 milliGRAM(s) Oral daily  psyllium Powder 1 Packet(s) Oral daily  vancomycin    Solution 125 milliGRAM(s) Oral two times a day    MEDICATIONS  (PRN):  acetaminophen    Suspension 650 milliGRAM(s) Oral every 6 hours PRN For Temp greater than 38 C (100.4 F)  acetaminophen    Suspension 650 milliGRAM(s) Oral every 6 hours PRN mild and moderate and severe pain  ALBUTerol    90 MICROgram(s) HFA Inhaler 2 Puff(s) Inhalation every 6 hours PRN Bronchospasm  calamine Lotion 1 Application(s) Topical every 4 hours PRN Rash and/or Itching  LORazepam    Concentrate 1 milliGRAM(s) Oral three times a day PRN Anxiety  oxyCODONE    IR 10 milliGRAM(s) Oral every 8 hours PRN Moderate Pain (4 - 6)  simethicone drops 80 milliGRAM(s) Oral four times a day PRN Gas      LABS:                        11.1   10.29 )-----------( 216      ( 25 Feb 2018 07:09 )             34.3     02-26    140  |  97<L>  |  17  ----------------------------<  108<H>  4.0   |  28  |  1.14    Ca    9.6      26 Feb 2018 05:25                MICROBIOLOGY:     RADIOLOGY:  [ ] Reviewed and interpreted by me    PULMONARY FUNCTION TESTS:    EKG:

## 2018-02-26 NOTE — CHART NOTE - NSCHARTNOTEFT_GEN_A_CORE
Mr. Dickens was admitted to [facility] on 2/9/18 with a dx of respiratory failure . Pt has a dx of Sarcoidosis/COPD and is chronically trached.  Pt requires  02 upon discharge.  Saturation levels for the patient are   A. on room air at rest: 93%   B During exercise room air: 88%   C On 02 @ 28% trach collar during exercise: 95%  Pt is in a chronic stable state.   Destinee Benitez, ANP-c

## 2018-02-26 NOTE — PROGRESS NOTE ADULT - PROBLEM SELECTOR PLAN 2
- now improved s/p trach changed to #8 XLT  - now tolerating trach collar 24/7   - ABG good  - cont trach collar as tolerated  - cont to wean as tolerated  - PMV also tolerated  - chest PT  - trach care daily  - suction prn  - trach changed to 8xlt cuffless - now improved s/p trach changed to #6 XLT cuffless  - now tolerating trach collar 24/7   - ABG good  - cont trach collar as tolerated  - cont to wean as tolerated  - PMV also tolerated  - chest PT  - trach care daily  - suction prn  - trach changed to 8xlt cuffless - now improved s/p trach changed to #6 shiley  cuffless  - now tolerating trach collar 24/7   - ABG good  - cont trach collar as tolerated  - cont to wean as tolerated  - PMV also tolerated  - chest PT  - trach care daily  - suction prn  - trach changed to 8xlt cuffless

## 2018-02-26 NOTE — CHART NOTE - NSCHARTNOTEFT_GEN_A_CORE
Procedure: Tracheostomy change    Operators: Dr. Rivas Molina    Indication: change trach from cuffed to cuffless, as pt is no longer on ventilator support    Plastic suction tubing was inserted through the tracheostomy. The old 8.0 cuffed shiley XLT tracheostomy was removed. A new 6.0 cuffless Shiley tracheostomy was inserted over the suction tubing and inserted through the stoma and was secured with velcro ties. Saturations remained above 90% throughout the procedure. Pt tolerated the procedure without complication.     Dr. Hathaway was present for the entire procedure

## 2018-02-27 VITALS — OXYGEN SATURATION: 98 % | HEART RATE: 117 BPM

## 2018-02-27 LAB
BUN SERPL-MCNC: 13 MG/DL — SIGNIFICANT CHANGE UP (ref 7–23)
CALCIUM SERPL-MCNC: 9.9 MG/DL — SIGNIFICANT CHANGE UP (ref 8.4–10.5)
CHLORIDE SERPL-SCNC: 97 MMOL/L — LOW (ref 98–107)
CO2 SERPL-SCNC: 29 MMOL/L — SIGNIFICANT CHANGE UP (ref 22–31)
CREAT SERPL-MCNC: 1.05 MG/DL — SIGNIFICANT CHANGE UP (ref 0.5–1.3)
GLUCOSE SERPL-MCNC: 98 MG/DL — SIGNIFICANT CHANGE UP (ref 70–99)
POTASSIUM SERPL-MCNC: 3.9 MMOL/L — SIGNIFICANT CHANGE UP (ref 3.5–5.3)
POTASSIUM SERPL-SCNC: 3.9 MMOL/L — SIGNIFICANT CHANGE UP (ref 3.5–5.3)
SODIUM SERPL-SCNC: 138 MMOL/L — SIGNIFICANT CHANGE UP (ref 135–145)

## 2018-02-27 PROCEDURE — 99233 SBSQ HOSP IP/OBS HIGH 50: CPT | Mod: GC

## 2018-02-27 PROCEDURE — 99232 SBSQ HOSP IP/OBS MODERATE 35: CPT

## 2018-02-27 RX ORDER — CALAMINE AND ZINC OXIDE AND PHENOL 160; 10 MG/ML; MG/ML
1 LOTION TOPICAL
Qty: 0 | Refills: 0 | COMMUNITY

## 2018-02-27 RX ORDER — CALAMINE AND ZINC OXIDE AND PHENOL 160; 10 MG/ML; MG/ML
1 LOTION TOPICAL
Qty: 0 | Refills: 0 | COMMUNITY
Start: 2018-02-27

## 2018-02-27 RX ORDER — LACTOBACILLUS ACIDOPHILUS 100MM CELL
1 CAPSULE ORAL
Qty: 120 | Refills: 0 | OUTPATIENT
Start: 2018-02-27 | End: 2018-03-28

## 2018-02-27 RX ORDER — ACETAMINOPHEN 500 MG
1 TABLET ORAL
Qty: 0 | Refills: 0 | COMMUNITY

## 2018-02-27 RX ORDER — ALBUTEROL 90 UG/1
2 AEROSOL, METERED ORAL
Qty: 1 | Refills: 0 | OUTPATIENT
Start: 2018-02-27

## 2018-02-27 RX ORDER — BUDESONIDE AND FORMOTEROL FUMARATE DIHYDRATE 160; 4.5 UG/1; UG/1
2 AEROSOL RESPIRATORY (INHALATION)
Qty: 1 | Refills: 0 | OUTPATIENT
Start: 2018-02-27

## 2018-02-27 RX ORDER — FENTANYL CITRATE 50 UG/ML
1 INJECTION INTRAVENOUS
Qty: 0 | Refills: 0 | COMMUNITY

## 2018-02-27 RX ORDER — FERROUS SULFATE 325(65) MG
5 TABLET ORAL
Qty: 150 | Refills: 0 | OUTPATIENT
Start: 2018-02-27 | End: 2018-03-28

## 2018-02-27 RX ORDER — ENOXAPARIN SODIUM 100 MG/ML
40 INJECTION SUBCUTANEOUS
Qty: 0 | Refills: 0 | COMMUNITY

## 2018-02-27 RX ORDER — OMEPRAZOLE 10 MG/1
10 CAPSULE, DELAYED RELEASE ORAL
Qty: 300 | Refills: 0 | OUTPATIENT
Start: 2018-02-27 | End: 2018-03-28

## 2018-02-27 RX ORDER — VANCOMYCIN HCL 1 G
1 VIAL (EA) INTRAVENOUS
Qty: 14 | Refills: 0 | OUTPATIENT
Start: 2018-02-27 | End: 2018-03-05

## 2018-02-27 RX ORDER — VANCOMYCIN HCL 1 G
125 VIAL (EA) INTRAVENOUS
Qty: 50 | Refills: 0 | OUTPATIENT
Start: 2018-02-27 | End: 2018-03-03

## 2018-02-27 RX ORDER — LACTOBACILLUS ACIDOPHILUS 100MM CELL
1 CAPSULE ORAL
Qty: 0 | Refills: 0 | COMMUNITY

## 2018-02-27 RX ORDER — FENTANYL CITRATE 50 UG/ML
1 INJECTION INTRAVENOUS
Qty: 2 | Refills: 0 | OUTPATIENT
Start: 2018-02-27 | End: 2018-03-04

## 2018-02-27 RX ORDER — FOLIC ACID 0.8 MG
1 TABLET ORAL
Qty: 30 | Refills: 0 | OUTPATIENT
Start: 2018-02-27 | End: 2018-03-28

## 2018-02-27 RX ORDER — IPRATROPIUM/ALBUTEROL SULFATE 18-103MCG
3 AEROSOL WITH ADAPTER (GRAM) INHALATION
Qty: 120 | Refills: 0 | OUTPATIENT
Start: 2018-02-27 | End: 2018-03-28

## 2018-02-27 RX ORDER — LANOLIN ALCOHOL/MO/W.PET/CERES
1 CREAM (GRAM) TOPICAL
Qty: 0 | Refills: 0 | COMMUNITY

## 2018-02-27 RX ORDER — PHENOL/SODIUM PHENOLATE
2 AEROSOL, SPRAY (ML) MUCOUS MEMBRANE
Qty: 0 | Refills: 0 | COMMUNITY

## 2018-02-27 RX ORDER — DOCUSATE SODIUM 100 MG
10 CAPSULE ORAL
Qty: 0 | Refills: 0 | COMMUNITY
Start: 2018-02-27

## 2018-02-27 RX ORDER — ACETAMINOPHEN 500 MG
20.31 TABLET ORAL
Qty: 0 | Refills: 0 | COMMUNITY
Start: 2018-02-27

## 2018-02-27 RX ORDER — PSYLLIUM SEED (WITH DEXTROSE)
0 POWDER (GRAM) ORAL
Qty: 0 | Refills: 0 | COMMUNITY
Start: 2018-02-27

## 2018-02-27 RX ORDER — OXYCODONE HYDROCHLORIDE 5 MG/1
1 TABLET ORAL
Qty: 21 | Refills: 0 | OUTPATIENT
Start: 2018-02-27 | End: 2018-03-05

## 2018-02-27 RX ORDER — OMEPRAZOLE 10 MG/1
20 CAPSULE, DELAYED RELEASE ORAL
Qty: 0 | Refills: 0 | COMMUNITY

## 2018-02-27 RX ORDER — BUDESONIDE, MICRONIZED 100 %
2 POWDER (GRAM) MISCELLANEOUS
Qty: 0 | Refills: 0 | COMMUNITY

## 2018-02-27 RX ORDER — OXYCODONE HYDROCHLORIDE 5 MG/1
1 TABLET ORAL
Qty: 0 | Refills: 0 | COMMUNITY

## 2018-02-27 RX ORDER — IPRATROPIUM/ALBUTEROL SULFATE 18-103MCG
3 AEROSOL WITH ADAPTER (GRAM) INHALATION
Qty: 0 | Refills: 0 | COMMUNITY

## 2018-02-27 RX ORDER — SIMETHICONE 80 MG/1
1.2 TABLET, CHEWABLE ORAL
Qty: 0 | Refills: 0 | COMMUNITY
Start: 2018-02-27

## 2018-02-27 RX ORDER — FERROUS SULFATE 325(65) MG
5 TABLET ORAL
Qty: 0 | Refills: 0 | COMMUNITY

## 2018-02-27 RX ORDER — FOLIC ACID 0.8 MG
1 TABLET ORAL
Qty: 0 | Refills: 0 | COMMUNITY

## 2018-02-27 RX ADMIN — Medication 1 TABLET(S): at 08:38

## 2018-02-27 RX ADMIN — Medication 1 MILLIGRAM(S): at 14:36

## 2018-02-27 RX ADMIN — Medication 3 MILLILITER(S): at 10:40

## 2018-02-27 RX ADMIN — Medication 3 MILLILITER(S): at 04:06

## 2018-02-27 RX ADMIN — CHLORHEXIDINE GLUCONATE 1 APPLICATION(S): 213 SOLUTION TOPICAL at 13:02

## 2018-02-27 RX ADMIN — Medication 20 MILLIGRAM(S): at 06:12

## 2018-02-27 RX ADMIN — Medication 125 MILLIGRAM(S): at 06:11

## 2018-02-27 RX ADMIN — Medication 1 TABLET(S): at 13:01

## 2018-02-27 RX ADMIN — Medication 1 MILLIGRAM(S): at 13:01

## 2018-02-27 RX ADMIN — PANTOPRAZOLE SODIUM 40 MILLIGRAM(S): 20 TABLET, DELAYED RELEASE ORAL at 13:02

## 2018-02-27 RX ADMIN — Medication 3 MILLILITER(S): at 14:32

## 2018-02-27 RX ADMIN — Medication 1 PACKET(S): at 13:02

## 2018-02-27 RX ADMIN — ENOXAPARIN SODIUM 40 MILLIGRAM(S): 100 INJECTION SUBCUTANEOUS at 13:01

## 2018-02-27 RX ADMIN — BUDESONIDE AND FORMOTEROL FUMARATE DIHYDRATE 2 PUFF(S): 160; 4.5 AEROSOL RESPIRATORY (INHALATION) at 10:40

## 2018-02-27 RX ADMIN — OXYCODONE HYDROCHLORIDE 10 MILLIGRAM(S): 5 TABLET ORAL at 09:10

## 2018-02-27 RX ADMIN — OXYCODONE HYDROCHLORIDE 10 MILLIGRAM(S): 5 TABLET ORAL at 08:40

## 2018-02-27 NOTE — PROGRESS NOTE ADULT - GUM GEN PE MLT EXAM PC
not examined
Normal genitalia; no lesions; no discharge
not examined
Normal genitalia; no lesions; no discharge
not examined
detailed exam
not examined
detailed exam
Normal genitalia; no lesions; no discharge
detailed exam
detailed exam
not examined
not examined

## 2018-02-27 NOTE — PROGRESS NOTE ADULT - NECK DETAILS
tracheostomy present
no JVD/supple
tracheostomy present
trach+
no JVD
trach #6 shiley cuffless/supple
supple/+ trach 8xlt cuffless
trach+
trach+
tracheostomy present
trach+
trach+
tracheostomy present
+ trach/supple

## 2018-02-27 NOTE — PROGRESS NOTE ADULT - PROBLEM SELECTOR PLAN 1
- sepsis resolved  - VRE bacteremia   - afebrile  - leukocytosis resolved  - bactrim completed  - completed ampicillin   - ID note appreciated  - RVP negative  - legionella negative  - blood culture cleared since 2/11  - JONATAN done in MICU, negative  - urine culture negative, CT A/P negative for source of bacteremia  - monitor  - po vanco emperically for c-diff prophalaxus

## 2018-02-27 NOTE — PROGRESS NOTE ADULT - PROBLEM SELECTOR PLAN 7
- cont meds prn

## 2018-02-27 NOTE — PROGRESS NOTE ADULT - PROBLEM SELECTOR PROBLEM 3
Sepsis due to vancomycin resistant Enterococcus species
COPD (chronic obstructive pulmonary disease)
COPD (chronic obstructive pulmonary disease)
MRSA (methicillin resistant staph aureus) culture positive
COPD (chronic obstructive pulmonary disease)
Sepsis due to vancomycin resistant Enterococcus species
Sepsis due to vancomycin resistant Enterococcus species
COPD (chronic obstructive pulmonary disease)

## 2018-02-27 NOTE — PROGRESS NOTE ADULT - SUBJECTIVE AND OBJECTIVE BOX
CHIEF COMPLAINT:    Interval Events:    REVIEW OF SYSTEMS:  Constitutional:   Eyes:  ENT:  CV:  Resp:  GI:  :  MSK:  Integumentary:  Neurological:  Psychiatric:  Endocrine:  Hematologic/Lymphatic:  Allergic/Immunologic:  [ ] All other systems negative  [ ] Unable to assess ROS because ________    OBJECTIVE:  ICU Vital Signs Last 24 Hrs  T(C): 36.6 (27 Feb 2018 13:03), Max: 37.1 (26 Feb 2018 21:22)  T(F): 97.9 (27 Feb 2018 13:03), Max: 98.7 (26 Feb 2018 21:22)  HR: 103 (27 Feb 2018 13:03) (100 - 113)  BP: 107/95 (27 Feb 2018 13:03) (98/84 - 116/85)  BP(mean): --  ABP: --  ABP(mean): --  RR: 20 (27 Feb 2018 13:03) (18 - 20)  SpO2: 97% (27 Feb 2018 13:03) (94% - 100%)    Mode: standby    02-26 @ 07:01  -  02-27 @ 07:00  --------------------------------------------------------  IN: 0 mL / OUT: 1100 mL / NET: -1100 mL      CAPILLARY BLOOD GLUCOSE          PHYSICAL EXAM:  General:   HEENT:   Lymph Nodes:  Neck:   Respiratory:   Cardiovascular:   Abdomen:   Extremities:   Skin:   Neurological:  Psychiatry:    HOSPITAL MEDICATIONS:  MEDICATIONS  (STANDING):  ALBUTerol    90 MICROgram(s) HFA Inhaler 2 Puff(s) Inhalation every 6 hours  ALBUTerol/ipratropium for Nebulization 3 milliLiter(s) Nebulizer every 6 hours  buDESOnide 160 MICROgram(s)/formoterol 4.5 MICROgram(s) Inhaler 2 Puff(s) Inhalation two times a day  chlorhexidine 4% Liquid 1 Application(s) Topical daily  docusate sodium Liquid 100 milliGRAM(s) Oral two times a day  enoxaparin Injectable 40 milliGRAM(s) SubCutaneous daily  folic acid 1 milliGRAM(s) Oral daily  lactobacillus acidophilus 1 Tablet(s) Oral four times a day with meals  pantoprazole   Suspension 40 milliGRAM(s) Oral daily  predniSONE Concentrate 20 milliGRAM(s) Oral daily  psyllium Powder 1 Packet(s) Oral daily  vancomycin    Solution 125 milliGRAM(s) Oral two times a day    MEDICATIONS  (PRN):  acetaminophen    Suspension 650 milliGRAM(s) Oral every 6 hours PRN For Temp greater than 38 C (100.4 F)  acetaminophen    Suspension 650 milliGRAM(s) Oral every 6 hours PRN mild and moderate and severe pain  ALBUTerol    90 MICROgram(s) HFA Inhaler 2 Puff(s) Inhalation every 6 hours PRN Bronchospasm  calamine Lotion 1 Application(s) Topical every 4 hours PRN Rash and/or Itching  LORazepam    Concentrate 1 milliGRAM(s) Oral three times a day PRN Anxiety  oxyCODONE    IR 10 milliGRAM(s) Oral every 8 hours PRN Moderate Pain (4 - 6)  simethicone drops 80 milliGRAM(s) Oral four times a day PRN Gas      LABS:    02-27    138  |  97<L>  |  13  ----------------------------<  98  3.9   |  29  |  1.05    Ca    9.9      27 Feb 2018 06:44                MICROBIOLOGY:     RADIOLOGY:  [ ] Reviewed and interpreted by me    PULMONARY FUNCTION TESTS:    EKG: CHIEF COMPLAINT: Patient is a 47y old  Male who presents with a chief complaint of Patient is a 47y old  Male who presents with a chief complaint of acute on chronic respiratory distress (09 Feb 2018 19:01) (25 Feb 2018 18:04)      Interval Events: dc today     REVIEW OF SYSTEMS:  Constitutional: Feels well, no fever no chills   Eyes:  ENT:  CV: denies   Resp: denies   GI: denies   [ x] All other systems negative  [ ] Unable to assess ROS because ________    OBJECTIVE:  ICU Vital Signs Last 24 Hrs  T(C): 36.6 (27 Feb 2018 13:03), Max: 37.1 (26 Feb 2018 21:22)  T(F): 97.9 (27 Feb 2018 13:03), Max: 98.7 (26 Feb 2018 21:22)  HR: 103 (27 Feb 2018 13:03) (100 - 113)  BP: 107/95 (27 Feb 2018 13:03) (98/84 - 116/85)  BP(mean): --  ABP: --  ABP(mean): --  RR: 20 (27 Feb 2018 13:03) (18 - 20)  SpO2: 97% (27 Feb 2018 13:03) (94% - 100%)    Mode: standby    02-26 @ 07:01  -  02-27 @ 07:00  --------------------------------------------------------  IN: 0 mL / OUT: 1100 mL / NET: -1100 mL      CAPILLARY BLOOD GLUCOSE        HOSPITAL MEDICATIONS:  MEDICATIONS  (STANDING):  ALBUTerol    90 MICROgram(s) HFA Inhaler 2 Puff(s) Inhalation every 6 hours  ALBUTerol/ipratropium for Nebulization 3 milliLiter(s) Nebulizer every 6 hours  buDESOnide 160 MICROgram(s)/formoterol 4.5 MICROgram(s) Inhaler 2 Puff(s) Inhalation two times a day  chlorhexidine 4% Liquid 1 Application(s) Topical daily  docusate sodium Liquid 100 milliGRAM(s) Oral two times a day  enoxaparin Injectable 40 milliGRAM(s) SubCutaneous daily  folic acid 1 milliGRAM(s) Oral daily  lactobacillus acidophilus 1 Tablet(s) Oral four times a day with meals  pantoprazole   Suspension 40 milliGRAM(s) Oral daily  predniSONE Concentrate 20 milliGRAM(s) Oral daily  psyllium Powder 1 Packet(s) Oral daily  vancomycin    Solution 125 milliGRAM(s) Oral two times a day    MEDICATIONS  (PRN):  acetaminophen    Suspension 650 milliGRAM(s) Oral every 6 hours PRN For Temp greater than 38 C (100.4 F)  acetaminophen    Suspension 650 milliGRAM(s) Oral every 6 hours PRN mild and moderate and severe pain  ALBUTerol    90 MICROgram(s) HFA Inhaler 2 Puff(s) Inhalation every 6 hours PRN Bronchospasm  calamine Lotion 1 Application(s) Topical every 4 hours PRN Rash and/or Itching  LORazepam    Concentrate 1 milliGRAM(s) Oral three times a day PRN Anxiety  oxyCODONE    IR 10 milliGRAM(s) Oral every 8 hours PRN Moderate Pain (4 - 6)  simethicone drops 80 milliGRAM(s) Oral four times a day PRN Gas      LABS:    02-27    138  |  97<L>  |  13  ----------------------------<  98  3.9   |  29  |  1.05    Ca    9.9      27 Feb 2018 06:44                MICROBIOLOGY:     RADIOLOGY:  [ ] Reviewed and interpreted by me    PULMONARY FUNCTION TESTS:    EKG:

## 2018-02-27 NOTE — PROGRESS NOTE ADULT - GASTROINTESTINAL DETAILS
soft/nontender
normal
nontender/soft
nontender/soft
no distention/nontender/soft/no rebound tenderness
normal
soft/PEG tube present/nontender/no distention
bowel sounds normal/nontender/no distention/soft/PEG feeds continue
nontender/no distention/+ peg/soft
nontender/no rebound tenderness/no distention/soft
soft/nontender/+ PEG insitu/no distention
normal
no rebound tenderness/soft/nontender/no distention
nontender/soft
no rebound tenderness/no distention/soft/nontender
normal
soft/no rebound tenderness/no distention/nontender
normal
normal
nontender/+ PEG/soft

## 2018-02-27 NOTE — PROGRESS NOTE ADULT - MS EXT PE MLT D E PC
no pedal edema/no cyanosis/no clubbing
no pedal edema/no cyanosis/no clubbing
no clubbing/no cyanosis/no pedal edema
no cyanosis/no pedal edema/no clubbing
no pedal edema
no clubbing/no cyanosis/no pedal edema
no pedal edema/no cyanosis/no clubbing/heel boots on
normal/no pedal edema
no pedal edema
no pedal edema/no clubbing/no cyanosis
no pedal edema
no pedal edema/no cyanosis/no clubbing
no pedal edema
no pedal edema

## 2018-02-27 NOTE — PROGRESS NOTE ADULT - SUBJECTIVE AND OBJECTIVE BOX
OSCAR CHACKO 47y [id number]    Patient is a 47y old  Male who presents with a chief complaint of Patient is a 47y old  Male who presents with a chief complaint of acute on chronic respiratory distress (09 Feb 2018 19:01) (25 Feb 2018 18:04)      Follow Up/CC:  Bacteremia    Interval History/ROS: feels ok, s/p trach change    Allergies    No Known Allergies    Intolerances        ANTIMICROBIALS:  vancomycin    Solution 125 two times a day      MEDICATIONS  (STANDING):  ALBUTerol    90 MICROgram(s) HFA Inhaler 2 Puff(s) Inhalation every 6 hours  ALBUTerol/ipratropium for Nebulization 3 milliLiter(s) Nebulizer every 6 hours  buDESOnide 160 MICROgram(s)/formoterol 4.5 MICROgram(s) Inhaler 2 Puff(s) Inhalation two times a day  chlorhexidine 4% Liquid 1 Application(s) Topical daily  docusate sodium Liquid 100 milliGRAM(s) Oral two times a day  enoxaparin Injectable 40 milliGRAM(s) SubCutaneous daily  folic acid 1 milliGRAM(s) Oral daily  lactobacillus acidophilus 1 Tablet(s) Oral four times a day with meals  pantoprazole   Suspension 40 milliGRAM(s) Oral daily  predniSONE Concentrate 20 milliGRAM(s) Oral daily  psyllium Powder 1 Packet(s) Oral daily  vancomycin    Solution 125 milliGRAM(s) Oral two times a day    MEDICATIONS  (PRN):  acetaminophen    Suspension 650 milliGRAM(s) Oral every 6 hours PRN For Temp greater than 38 C (100.4 F)  acetaminophen    Suspension 650 milliGRAM(s) Oral every 6 hours PRN mild and moderate and severe pain  ALBUTerol    90 MICROgram(s) HFA Inhaler 2 Puff(s) Inhalation every 6 hours PRN Bronchospasm  calamine Lotion 1 Application(s) Topical every 4 hours PRN Rash and/or Itching  LORazepam    Concentrate 1 milliGRAM(s) Oral three times a day PRN Anxiety  oxyCODONE    IR 10 milliGRAM(s) Oral every 8 hours PRN Moderate Pain (4 - 6)  simethicone drops 80 milliGRAM(s) Oral four times a day PRN Gas        Vital Signs Last 24 Hrs  T(C): 36.2 (27 Feb 2018 06:10), Max: 37.1 (26 Feb 2018 21:22)  T(F): 97.1 (27 Feb 2018 06:10), Max: 98.7 (26 Feb 2018 21:22)  HR: 113 (27 Feb 2018 10:40) (100 - 113)  BP: 116/85 (27 Feb 2018 08:40) (98/84 - 116/85)  BP(mean): --  RR: 20 (27 Feb 2018 08:40) (18 - 20)  SpO2: 97% (27 Feb 2018 10:40) (94% - 100%)      02-27    138  |  97<L>  |  13  ----------------------------<  98  3.9   |  29  |  1.05    Ca    9.9      27 Feb 2018 06:44            MICROBIOLOGY:  BLOOD  02-15-18 --  --  --      BLOOD  02-11-18 --  --  --      SPUTUM  02-10-18 --  --  Staph. aureus *MRSA*      URINE MIDSTREAM  02-09-18 --  --  --      BLOOD PERIPHERAL  02-09-18 --  --  --      BLOOD  02-09-18 --  --  BLOOD CULTURE PCR  Enterococcus faecalis VRE              v            RADIOLOGY

## 2018-02-27 NOTE — PROGRESS NOTE ADULT - PROBLEM SELECTOR PLAN 9
- lovenox
- lovenox  - will consult PM&R for poss acute rehab- Pt currently requesting to be dc to home with family support
- lovenox  - will consult PM&R for poss acute rehab- Pt currently requesting to be dc to home with family support
- lovenox
- lovenox  - will consult PM&R for poss acute rehab
- lovenox
- lovenox  - will consult PM&R for poss acute rehab- Pt currently requesting to be dc to home with family support
- lovenox  - will consult PM&R for poss acute rehab- Pt  requesting to be dc to home with family support
- lovenox  - will consult PM&R for poss acute rehab- Pt currently requesting to be dc to home with family support

## 2018-02-27 NOTE — PROGRESS NOTE ADULT - PROBLEM SELECTOR PROBLEM 7
Anxiety

## 2018-02-27 NOTE — PROGRESS NOTE ADULT - ATTENDING COMMENTS
Andres Cope  Attending Physician   Division of Infectious Disease  Pager #[phone]  After 5pm/weekend or no response, call #[phone] Andres Cope  Attending Physician   Division of Infectious Disease  Pager #[phone]  After 5pm/weekend or no response, call #[phone]    Will sign off, recall ID if needed #[phone].

## 2018-02-27 NOTE — PROGRESS NOTE ADULT - CARDIOVASCULAR DETAILS
tachycardia/positive S1/Tachy at times/positive S2
positive S1/positive S2
positive S2/positive S1
positive S1/positive S2
positive S2/positive S1
positive S2/positive S1
positive S1/positive S2
positive S2/positive S1
tachycardia
tachycardia/positive S2/positive S1
positive S1/positive S2
positive S2/positive S1

## 2018-02-27 NOTE — PROGRESS NOTE ADULT - BACK
not examined
not examined
detailed exam
No deformity or limitation of movement
not examined
No deformity or limitation of movement
not examined
not examined

## 2018-02-27 NOTE — PROGRESS NOTE ADULT - PROBLEM SELECTOR PLAN 5
- stable  - cont home medications  - will refer for potential lung transplant
- stable  - cont home medications  - will refer for potential lung transplant
-possible tracheobronchitis   -Bactrim DS 1 tab PEG bid x 5 days
-possible tracheobronchitis   -Bactrim DS 1 tab PEG bid x 5 days
- stable  - c/w home medications
- stable  - cont home medications  - will refer for potential lung transplant
- stable  - cont home medications  - will refer for potential lung transplant
- stable  - c/w home medications
- stable  - cont home medications  - will refer for potential lung transplant
- stable  - cont home medications  - will refer for potential lung transplant
- stable  - c/w home medications
- stable  - c/w home medications
- stable  - c/w home medications  - will refer for potential lung transplant
- stable  - cont home medications  - will refer for potential lung transplant
- stable  - c/w home medications
- stable  - c/w home medications
- stable  - cont home medications  - will refer for potential lung transplant  - family preparing for pt to be dc to home, equipment is being ordered  -family teaching done to care for patient at home
-possible tracheobronchitis   -Bactrim DS 1 tab PEG bid x 5 days
-possible tracheobronchitis   -Bactrim DS 1 tab PEG bid x 5 days
- stable  - cont home medications  - will refer for potential lung transplant  - family preparing for pt to be dc to home, equipment is being ordered  -family teaching done to care for patient at home

## 2018-02-27 NOTE — PROGRESS NOTE ADULT - PROVIDER SPECIALTY LIST ADULT
Infectious Disease
Pulmonology
Infectious Disease

## 2018-02-27 NOTE — PROGRESS NOTE ADULT - SKIN COMMENTS
See AAI flow sheet for skin details
See AAI sheets
See AAI sheets
See AAI flow sheet for skin details

## 2018-02-27 NOTE — PROGRESS NOTE ADULT - PROBLEM SELECTOR PLAN 6
- cont meds  - well controlled

## 2018-02-27 NOTE — PROGRESS NOTE ADULT - CONSTITUTIONAL
Well-developed, well nourished
detailed exam
Well-developed, well nourished
detailed exam
Well-developed, well nourished
Well-developed, well nourished
detailed exam
Well-developed, well nourished
detailed exam
Well-developed, well nourished
detailed exam
Well-developed, well nourished
Well-developed, well nourished

## 2018-02-27 NOTE — PROGRESS NOTE ADULT - NSHPATTENDINGPLANDISCUSS_GEN_ALL_CORE
NP
Patient, RCU team
Patient, RCU team
Patient, robert, RCU team
rcu team
rcu team and family
pt at [facility]
RCU attending
pt and RCU NP
patient and RCU team
pt + RCU NP
Patient, robert, RCU team
RCU team, patient and wife
Patient, RCU team
patient, wife and RCU Team at bedside

## 2018-02-27 NOTE — PROGRESS NOTE ADULT - PROBLEM SELECTOR PROBLEM 8
CVA (cerebral vascular accident)

## 2018-02-27 NOTE — PROGRESS NOTE ADULT - CONSTITUTIONAL DETAILS
no distress
no distress
well-groomed/no distress/well-nourished
no distress
no distress
well-groomed/respiratory distress
no distress

## 2018-02-27 NOTE — PROGRESS NOTE ADULT - NEUROLOGICAL DETAILS
alert and oriented x 3/responds to verbal commands/responds to pain
alert and oriented x 3/sensation intact/responds to pain/responds to verbal commands
responds to verbal commands/responds to pain/alert and oriented x 3
alert and oriented x 3/responds to pain/responds to verbal commands
alert and oriented x 3/responds to verbal commands
responds to pain/responds to verbal commands/sensation intact
alert and oriented x 3/responds to pain/responds to verbal commands
responds to verbal commands/alert and oriented x 3
responds to pain/alert and oriented x 3/responds to verbal commands
responds to verbal commands/alert and oriented x 3
responds to verbal commands/responds to pain/alert and oriented x 3
responds to verbal commands/alert and oriented x 3
alert and oriented x 3/responds to verbal commands

## 2018-02-27 NOTE — CHART NOTE - NSCHARTNOTEFT_GEN_A_CORE
The patient requires the head of the bed to be elevated more than 30degrees most of the time due to his chronic respiratory failure, sarcoidosis, asthma/copd,  chronic tracheostomy, and PEG tube.  Pillows or wedges are not adequate and the patient requires frequent changes in body position

## 2018-02-27 NOTE — PROGRESS NOTE ADULT - PSYCHIATRIC DETAILS
normal affect
normal affect/normal behavior
anxious/prn meds used
normal affect
normal affect

## 2018-02-27 NOTE — PROGRESS NOTE ADULT - CVS HE PE MLT D E PC
regular rate and rhythm
regular rate and rhythm
no rub/no murmur/regular rate and rhythm
no rub/no murmur/regular rate and rhythm
regular rate and rhythm
regular rate and rhythm
no rub/regular rate and rhythm/no murmur
no rub/regular rate and rhythm/no murmur
regular rate and rhythm
no rub/regular rate and rhythm/no murmur
regular rate and rhythm
regular rate and rhythm
regular rate and rhythm/no rub/no murmur
regular rate and rhythm

## 2018-02-27 NOTE — PROGRESS NOTE ADULT - RS GEN PE MLT RESP DETAILS PC
breath sounds equal/airway patent
breath sounds equal/airway patent/good air movement/respirations non-labored
breath sounds equal/airway patent/no wheezes
wheezes/scattered
good air movement/clear to auscultation bilaterally
good air movement/airway patent/breath sounds equal/respirations non-labored/no wheezes/Trach present
clear to auscultation bilaterally/airway patent/breath sounds equal/respirations non-labored
good air movement/respirations non-labored/breath sounds equal/airway patent
breath sounds equal/airway patent/good air movement
breath sounds equal/airway patent/good air movement
breath sounds equal/respirations non-labored/good air movement/scattered throughout/wheezes/airway patent
respirations non-labored/clear to auscultation bilaterally/airway patent/breath sounds equal/good air movement
airway patent/breath sounds equal
clear to auscultation bilaterally/respirations non-labored/breath sounds equal/airway patent
airway patent/good air movement/breath sounds equal/mild scattered/wheezes
diminished breath sounds, L/mild scattered/wheezes/airway patent
clear to auscultation bilaterally/breath sounds equal/respirations non-labored/good air movement
breath sounds equal/airway patent/wheezes/good air movement
airway patent/breath sounds equal
rhonchi/wheezes/airway patent/diminished breath sounds, R
breath sounds equal/airway patent/wheezes/respirations non-labored/good air movement/mild scattered wheezes
respirations non-labored/wheezes/airway patent/breath sounds equal/scattered/good air movement
breath sounds equal/clear to auscultation bilaterally/good air movement

## 2018-02-27 NOTE — PROGRESS NOTE ADULT - PROBLEM SELECTOR PROBLEM 5
MRSA (methicillin resistant staph aureus) culture positive
MRSA (methicillin resistant staph aureus) culture positive
Sarcoidosis
MRSA (methicillin resistant staph aureus) culture positive
Sarcoidosis
MRSA (methicillin resistant staph aureus) culture positive
Sarcoidosis

## 2018-02-27 NOTE — PROGRESS NOTE ADULT - PROBLEM SELECTOR PROBLEM 2
Fever
Respiratory distress
Respiratory distress
History of Clostridium difficile infection
Respiratory distress
Fever
Respiratory distress
Fever
Respiratory distress

## 2018-02-27 NOTE — PROGRESS NOTE ADULT - PROBLEM SELECTOR PROBLEM 1
VRE bacteremia
Sepsis
Sepsis
VRE bacteremia
Sepsis
VRE bacteremia
VRE bacteremia
Sepsis

## 2018-02-27 NOTE — PROGRESS NOTE ADULT - PROBLEM SELECTOR PLAN 8
- supportive care

## 2018-02-27 NOTE — PROGRESS NOTE ADULT - PROBLEM SELECTOR PLAN 4
-cont empiric po vanco  -stop vanco post IV abx
-cont empiric po vanco
-cont empiric po vanco
- now resolved  - monitor
- now resolved  - monitor
- now resolved  - sinus tach in 110s  - monitor
- now resolved  - monitor
- now resolved  - monitor
- resolved  - sinus tach in 110s  - monitor
- resolved  - sinus tach in 110s  - monitor
- now resolved  - NSR  - monitor
- now resolved  - NSR  - monitor
- now resolved
- now resolved  - NSR  - monitor
- now resolved  - NSR  - monitor
- resolved  - sinus tach in 110s  - monitor
- resolved  - sinus tach in 110s  - monitor
- now resolved  - monitor
-cont empiric po vanco
-cont empiric po vanco
- now resolved  - NSR  - monitor
- now resolved  - monitor
- resolved  - sinus tach in 110s  - monitor

## 2018-02-27 NOTE — PROGRESS NOTE ADULT - RESPIRATORY
detailed exam

## 2018-02-27 NOTE — PROGRESS NOTE ADULT - PROBLEM SELECTOR PROBLEM 4
History of Clostridium difficile infection
SVT (supraventricular tachycardia)
History of Clostridium difficile infection
SVT (supraventricular tachycardia)
History of Clostridium difficile infection
SVT (supraventricular tachycardia)

## 2018-02-27 NOTE — PROGRESS NOTE ADULT - BREASTS
not examined
not examined
No masses; no nipple discharge
No masses; no nipple discharge
not examined

## 2018-02-27 NOTE — PROGRESS NOTE ADULT - EXTREMITIES
No cyanosis, clubbing or edema
detailed exam
detailed exam
No cyanosis, clubbing or edema
detailed exam
detailed exam
No cyanosis, clubbing or edema
detailed exam
No cyanosis, clubbing or edema
detailed exam
No cyanosis, clubbing or edema
No cyanosis, clubbing or edema
detailed exam
No cyanosis, clubbing or edema
detailed exam

## 2018-02-27 NOTE — PROGRESS NOTE ADULT - PROBLEM SELECTOR PLAN 3
-as above
-as above  -CT a/p  -f/u urine cx
-as above
- cont nebs  - symbicort
- cont nebs  - symbicort
-s/p bactrim for tracheobronchitis
- as above  - cont inhalers and meds  - wean steroids in am
- cont nebs  - symbicort
- cont nebs  - symbicort
-Duonebs  -Symbicort inhaler
-Duonebs  -Symbicort inhaler
- cont nebs  - symbicort
-Albuterol / Atrovent Nebs   - Symbicort inhaler
-Duonebs  -Symbicort inhaler
- cont nebs  - symbicort
-as above
-as above  -CT a/p
- cont nebs  - symbicort
- cont nebs  - symbicort
-Duonebs  -Symbicort inhaler

## 2018-02-27 NOTE — PROGRESS NOTE ADULT - PROBLEM SELECTOR PROBLEM 6
Chronic pain

## 2018-02-27 NOTE — PROGRESS NOTE ADULT - PROBLEM SELECTOR PLAN 2
- now improved s/p trach changed to #6 shiley  cuffless  - now tolerating trach collar 24/7   - ABG good  - cont trach collar as tolerated  - cont to wean as tolerated  - PMV also tolerated  - chest PT  - trach care daily  - suction prn  - trach changed to #6 shiley cuffless

## 2018-02-27 NOTE — PROGRESS NOTE ADULT - ASSESSMENT
47M with h/o HTN, PFO,sarcoidosis c/b aspergilosis (s/p Voriconazole course in 11/2017), asthma/COPD, s/p CVA w/ L hemiplegia ((9/2017), pAfib (not on A/C 2/2 recurrent hemoptysis), s/p emergency L upper lobectomy for hemoptysis requiring IR emoblization of L bronchial artery (11/2017) complicated by pneumothorax requiring chest tube, s/p tracheostomy (11/24/17) for acute hypoxic respiratory failure and PEG placement (12/6/18), broncho-pulmonary fistula, thyroid arterial pesuodaneyrusm and recent C.diff colitis (1/2018 s/p course of vanco/flagyl via PEG)  presenting from acute nursing facility for acute on chronic respiratory failure, tachycardia and fevers x 1 day. Of note, collateral history obtained from chart ([facility] documents) and family present at bedside. Three days prior, pt started developing low grade fevers (Tm 100.5) and developed right -sided abdominal discomfort with VRE bacteremia
47M with h/o HTN, PFO,sarcoidosis c/b aspergilosis (s/p Voriconazole course in 11/2017), asthma/COPD, s/p CVA w/ L hemiplegia ((9/2017), pAfib (not on A/C 2/2 recurrent hemoptysis), s/p emergency L upper lobectomy for hemoptysis requiring IR emoblization of L bronchial artery (11/2017) complicated by pneumothorax requiring chest tube, s/p tracheostomy (11/24/17) for acute hypoxic respiratory failure and PEG placement (12/6/18), broncho-pulmonary fistula, thyroid arterial pesuodaneyrusm and recent C.diff colitis (1/2018 s/p course of vanco/flagyl via PEG)  presenting from acute nursing facility for acute on chronic respiratory failure, tachycardia and fevers x 1 day. Of note, collateral history obtained from chart (ANF documents) and family present at bedside. Three days prior, pt started developing low grade fevers (Tm 100.5) and developed right -sided abdominal discomfort with VRE bacteremia    For  VRE bacteremia, ampicillin 2 gm iv q, repeat bcx -ve, check TTE, CT abd/pelvis -ve, UCx -ve, plan 2 weeks IV abx from 2/11 unless TTE shows negetation    -can change abx to unasyn 3 gm iv q6 dosing for convenience instead of Q4 with   ampicillin  -PICC     History of Clostridium difficile infection. -cont empiric po vanco,      MRSA in the sputum from the trach- possibly tracheobronchitis, pt is s/p 5 days of bactrim     will ines attending
47M with h/o HTN, PFO,sarcoidosis c/b aspergilosis (s/p Voriconazole course in 11/2017), asthma/COPD, s/p CVA w/ L hemiplegia ((9/2017), pAfib (not on A/C 2/2 recurrent hemoptysis), s/p emergency L upper lobectomy for hemoptysis requiring IR emoblization of L bronchial artery (11/2017) complicated by pneumothorax requiring chest tube, s/p tracheostomy (11/24/17) for acute hypoxic respiratory failure and PEG placement (12/6/18), broncho-pulmonary fistula, thyroid arterial pesuodaneyrusm and recent C.diff colitis (1/2018 s/p course of vanco/flagyl via PEG)  presenting from acute nursing facility for acute on chronic respiratory failure, tachycardia and fevers x 1 day. Of note, collateral history obtained from chart (ANF documents) and family present at bedside. Three days prior, pt started developing low grade fevers (Tm 100.5) and developed right -sided abdominal discomfort with VRE bacteremia    VRE bacteremia  -Continue ampicillin 2 gm IV q4 hrs  -Check TTE  -Plan 2 weeks IV abx from 2/11 unless TTE shows vegetation    -Can change abx to unasyn 3 gm iv q6 dosing for convenience instead of Q4 with ampicillin  -Will need a PICC     History of Clostridium difficile infection  -Continue empiric po vanco,      MRSA in the sputum from the trach  -Possibly tracheobronchitis completed 5 days of bactrim
47M with h/o HTN, PFO,sarcoidosis c/b aspergilosis (s/p Voriconazole course in 11/2017), asthma/COPD, s/p CVA w/ L hemiplegia ((9/2017), pAfib (not on A/C 2/2 recurrent hemoptysis), s/p emergency L upper lobectomy for hemoptysis requiring IR emoblization of L bronchial artery (11/2017) complicated by pneumothorax requiring chest tube, s/p tracheostomy (11/24/17) for acute hypoxic respiratory failure and PEG placement (12/6/18), broncho-pulmonary fistula, thyroid arterial pesuodaneyrusm and recent C.diff colitis (1/2018 s/p course of vanco/flagyl via PEG)  presenting from acute nursing facility for acute on chronic respiratory failure, tachycardia and fevers x 1 day. Of note, collateral history obtained from chart (ANF documents) and family present at bedside. Three days prior, pt started developing low grade fevers (Tm 100.5) and developed right -sided abdominal discomfort with VRE bacteremia    VRE bacteremia  -Continue ampicillin 2 gm IV q4 hrs  -JONATAN performed in MICU and negative for vegetation - note in sunrise  -Plan 2 weeks IV abx to complete 2/25/18      History of Clostridium difficile infection  -Continue empiric po vanco     MRSA in the sputum from the trach  -Possibly tracheobronchitis completed 5 days of bactrim
47M with complex PMHx significant for HTN, PFO,sarcoidosis c/b aspergillosis asthma/COPD, s/p CVA w/ L hemiplegia ((9/2017), pAfib (not on A/C 2/2 recurrent hemoptysis), s/p emergency L upper lobectomy for hemoptysis requiring IR embolization of L bronchial artery (11/2017) , s/p tracheostomy (11/24/17) and PEG placement (12/6/18) now returns from [facility] w/ acute on chronic respiratory failure, tachycardia and fevers x 1 week. Admitted with sepsis, VRE bacteremia and tracheitis. Pt remains HD stable requiring RCU admission for aggressive respiratory care.
47M with complex PMHx significant for HTN, PFO,sarcoidosis c/b aspergillosis asthma/COPD, s/p CVA w/ L hemiplegia ((9/2017), pAfib (not on A/C 2/2 recurrent hemoptysis), s/p emergency L upper lobectomy for hemoptysis requiring IR embolization of L bronchial artery (11/2017) , s/p tracheostomy (11/24/17) and PEG placement (12/6/18) now returns from [facility] w/ acute on chronic respiratory failure, tachycardia and fevers x 1 week. Admitted with sepsis, VRE bacteremia and tracheitis. Pt remains HD stable requiring RCU admission for aggressive respiratory care. Mr. Dickens has transitioned to trach collar 24 hours per day, at this time.
47M with h/o HTN, PFO,sarcoidosis c/b aspergilosis (s/p Voriconazole course in 11/2017), asthma/COPD, s/p CVA w/ L hemiplegia ((9/2017), pAfib (not on A/C 2/2 recurrent hemoptysis), s/p emergency L upper lobectomy for hemoptysis requiring IR emoblization of L bronchial artery (11/2017) complicated by pneumothorax requiring chest tube, s/p tracheostomy (11/24/17) for acute hypoxic respiratory failure and PEG placement (12/6/18), broncho-pulmonary fistula, thyroid arterial pesuodaneyrusm and recent C.diff colitis (1/2018 s/p course of vanco/flagyl via PEG)  presenting from acute nursing facility for acute on chronic respiratory failure, tachycardia and fevers x 1 day. Of note, collateral history obtained from chart (ANF documents) and family present at bedside. Three days prior, pt started developing low grade fevers (Tm 100.5) and developed right -sided abdominal discomfort with VRE bacteremia
47M with complex PMHx significant for HTN, PFO,sarcoidosis c/b aspergillosis asthma/COPD, s/p CVA w/ L hemiplegia ((9/2017), pAfib (not on A/C 2/2 recurrent hemoptysis), s/p emergency L upper lobectomy for hemoptysis requiring IR embolization of L bronchial artery (11/2017) , s/p tracheostomy (11/24/17) and PEG placement (12/6/18) now returns from [facility] w/ acute on chronic respiratory failure, tachycardia and fevers x 1 week. Admitted with sepsis likely 2/2 HCAP given CXR findings and possible superimposed COPD exacerbation. Pt remains HD stable requiring RCU admission for aggressive respiratory care.
47M with complex PMHx significant for HTN, PFO,sarcoidosis c/b aspergillosis asthma/COPD, s/p CVA w/ L hemiplegia ((9/2017), pAfib (not on A/C 2/2 recurrent hemoptysis), s/p emergency L upper lobectomy for hemoptysis requiring IR embolization of L bronchial artery (11/2017) , s/p tracheostomy (11/24/17) and PEG placement (12/6/18) now returns from [facility] w/ acute on chronic respiratory failure, tachycardia and fevers x 1 week. Admitted with sepsis, VRE bacteremia and tracheitis. Pt remains HD stable requiring RCU admission for aggressive respiratory care. Mr. Dickens has transitioned to trach collar 24 hours per day, at this time.
47M with complex PMHx significant for HTN, PFO,sarcoidosis c/b aspergillosis asthma/COPD, s/p CVA w/ L hemiplegia ((9/2017), pAfib (not on A/C 2/2 recurrent hemoptysis), s/p emergency L upper lobectomy for hemoptysis requiring IR embolization of L bronchial artery (11/2017) , s/p tracheostomy (11/24/17) and PEG placement (12/6/18) now returns from [facility] w/ acute on chronic respiratory failure, tachycardia and fevers x 1 week. Admitted with sepsis, VRE bacteremia and tracheitis. Pt remains HD stable requiring RCU admission for aggressive respiratory care.
47M with complex PMHx significant for HTN, PFO,sarcoidosis c/b aspergillosis asthma/COPD, s/p CVA w/ L hemiplegia ((9/2017), pAfib (not on A/C 2/2 recurrent hemoptysis), s/p emergency L upper lobectomy for hemoptysis requiring IR embolization of L bronchial artery (11/2017) , s/p tracheostomy (11/24/17) and PEG placement (12/6/18) now returns from [facility] w/ acute on chronic respiratory failure, tachycardia and fevers x 1 week. Admitted with sepsis likely 2/2 HCAP given CXR findings and possible superimposed COPD exacerbation. Pt remains HD stable requiring RCU admission for aggressive respiratory care.
47M with complex PMHx significant for HTN, PFO,sarcoidosis c/b aspergillosis asthma/COPD, s/p CVA w/ L hemiplegia ((9/2017), pAfib (not on A/C 2/2 recurrent hemoptysis), s/p emergency L upper lobectomy for hemoptysis requiring IR embolization of L bronchial artery (11/2017) , s/p tracheostomy (11/24/17) and PEG placement (12/6/18) now returns from [facility] w/ acute on chronic respiratory failure, tachycardia and fevers x 1 week. Admitted with sepsis, VRE bacteremia and tracheitis. Pt remains HD stable requiring RCU admission for aggressive respiratory care.
47M with complex PMHx significant for HTN, PFO,sarcoidosis c/b aspergillosis asthma/COPD, s/p CVA w/ L hemiplegia ((9/2017), pAfib (not on A/C 2/2 recurrent hemoptysis), s/p emergency L upper lobectomy for hemoptysis requiring IR embolization of L bronchial artery (11/2017) , s/p tracheostomy (11/24/17) and PEG placement (12/6/18) now returns from [facility] w/ acute on chronic respiratory failure, tachycardia and fevers x 1 week. Admitted with sepsis likely 2/2 HCAP given CXR findings and possible superimposed COPD exacerbation. Pt remains HD stable requiring RCU admission for aggressive respiratory care.
47M with complex PMHx significant for HTN, PFO,sarcoidosis c/b aspergillosis asthma/COPD, s/p CVA w/ L hemiplegia ((9/2017), pAfib (not on A/C 2/2 recurrent hemoptysis), s/p emergency L upper lobectomy for hemoptysis requiring IR embolization of L bronchial artery (11/2017) , s/p tracheostomy (11/24/17) and PEG placement (12/6/18) now returns from [facility] w/ acute on chronic respiratory failure, tachycardia and fevers x 1 week. Admitted with sepsis, VRE bacteremia and tracheitis. Pt remains HD stable requiring RCU admission for aggressive respiratory care. Mr. Dickens has transitioned to trach collar 24 hours per day, at this time.
47M with h/o HTN, PFO,sarcoidosis c/b aspergilosis (s/p Voriconazole course in 11/2017), asthma/COPD, s/p CVA w/ L hemiplegia ((9/2017), pAfib (not on A/C 2/2 recurrent hemoptysis), s/p emergency L upper lobectomy for hemoptysis requiring IR emoblization of L bronchial artery (11/2017) complicated by pneumothorax requiring chest tube, s/p tracheostomy (11/24/17) for acute hypoxic respiratory failure and PEG placement (12/6/18), broncho-pulmonary fistula, thyroid arterial pesuodaneyrusm and recent C.diff colitis (1/2018 s/p course of vanco/flagyl via PEG)  presenting from acute nursing facility for acute on chronic respiratory failure, tachycardia and fevers x 1 day. Of note, collateral history obtained from chart ([facility]F documents) and family present at bedside. Three days prior, pt started developing low grade fevers (Tm 100.5) and developed right -sided abdominal discomfort with VRE bacteremia
47M with h/o HTN, PFO,sarcoidosis c/b aspergilosis (s/p Voriconazole course in 11/2017), asthma/COPD, s/p CVA w/ L hemiplegia ((9/2017), pAfib (not on A/C 2/2 recurrent hemoptysis), s/p emergency L upper lobectomy for hemoptysis requiring IR emoblization of L bronchial artery (11/2017) complicated by pneumothorax requiring chest tube, s/p tracheostomy (11/24/17) for acute hypoxic respiratory failure and PEG placement (12/6/18), broncho-pulmonary fistula, thyroid arterial pesuodaneyrusm and recent C.diff colitis (1/2018 s/p course of vanco/flagyl via PEG)  presenting from acute nursing facility for acute on chronic respiratory failure, tachycardia and fevers x 1 day. Of note, collateral history obtained from chart ([facility]F documents) and family present at bedside. Three days prior, pt started developing low grade fevers (Tm 100.5) and developed right -sided abdominal discomfort with VRE bacteremia
47M with complex PMHx significant for HTN, PFO,sarcoidosis c/b aspergillosis asthma/COPD, s/p CVA w/ L hemiplegia ((9/2017), pAfib (not on A/C 2/2 recurrent hemoptysis), s/p emergency L upper lobectomy for hemoptysis requiring IR embolization of L bronchial artery (11/2017) , s/p tracheostomy (11/24/17) and PEG placement (12/6/18) now returns from [facility] w/ acute on chronic respiratory failure, tachycardia and fevers x 1 week. Admitted with sepsis likely 2/2 HCAP given CXR findings and possible superimposed COPD exacerbation. Pt remains HD stable requiring RCU admission for aggressive respiratory care.
47M with complex PMHx significant for HTN, PFO,sarcoidosis c/b aspergillosis asthma/COPD, s/p CVA w/ L hemiplegia ((9/2017), pAfib (not on A/C 2/2 recurrent hemoptysis), s/p emergency L upper lobectomy for hemoptysis requiring IR embolization of L bronchial artery (11/2017) , s/p tracheostomy (11/24/17) and PEG placement (12/6/18) now returns from [facility] w/ acute on chronic respiratory failure, tachycardia and fevers x 1 week. Admitted with sepsis, VRE bacteremia and tracheitis. Pt remains HD stable requiring RCU admission for aggressive respiratory care.
47M with complex PMHx significant for HTN, PFO,sarcoidosis c/b aspergillosis asthma/COPD, s/p CVA w/ L hemiplegia ((9/2017), pAfib (not on A/C 2/2 recurrent hemoptysis), s/p emergency L upper lobectomy for hemoptysis requiring IR embolization of L bronchial artery (11/2017) , s/p tracheostomy (11/24/17) and PEG placement (12/6/18) now returns from [facility] w/ acute on chronic respiratory failure, tachycardia and fevers x 1 week. Admitted with sepsis, VRE bacteremia and tracheitis. Pt remains HD stable requiring RCU admission for aggressive respiratory care. Mr. Dickens has transitioned to trach collar 24 hours per day, at this time.

## 2018-02-27 NOTE — PROGRESS NOTE ADULT - NS ABD PE RECTAL EXAM
not examined

## 2018-02-27 NOTE — PROGRESS NOTE ADULT - NS NEC GEN PE MLT EXAM PC
detailed exam
No bruits; no thyromegaly or nodules
No bruits; no thyromegaly or nodules
detailed exam
No bruits; no thyromegaly or nodules
detailed exam
No bruits; no thyromegaly or nodules
No bruits; no thyromegaly or nodules
detailed exam
No bruits; no thyromegaly or nodules
No bruits; no thyromegaly or nodules
detailed exam
No bruits; no thyromegaly or nodules
No bruits; no thyromegaly or nodules
detailed exam

## 2018-03-01 RX ORDER — BUDESONIDE AND FORMOTEROL FUMARATE DIHYDRATE 160; 4.5 UG/1; UG/1
2 AEROSOL RESPIRATORY (INHALATION)
Qty: 1 | Refills: 0 | OUTPATIENT
Start: 2018-03-01 | End: 2018-03-30

## 2018-04-26 ENCOUNTER — APPOINTMENT (OUTPATIENT)
Dept: GASTROENTEROLOGY | Facility: HOSPITAL | Age: 48
End: 2018-04-26

## 2018-05-03 ENCOUNTER — APPOINTMENT (OUTPATIENT)
Dept: GASTROENTEROLOGY | Facility: HOSPITAL | Age: 48
End: 2018-05-03

## 2018-05-03 ENCOUNTER — OUTPATIENT (OUTPATIENT)
Dept: OUTPATIENT SERVICES | Facility: HOSPITAL | Age: 48
LOS: 1 days | End: 2018-05-03

## 2018-05-03 VITALS — SYSTOLIC BLOOD PRESSURE: 149 MMHG | HEIGHT: 71 IN | DIASTOLIC BLOOD PRESSURE: 111 MMHG | HEART RATE: 100 BPM

## 2018-05-03 DIAGNOSIS — Z78.9 OTHER SPECIFIED HEALTH STATUS: ICD-10-CM

## 2018-05-03 DIAGNOSIS — Z98.890 OTHER SPECIFIED POSTPROCEDURAL STATES: Chronic | ICD-10-CM

## 2018-05-03 DIAGNOSIS — Z86.79 PERSONAL HISTORY OF OTHER DISEASES OF THE CIRCULATORY SYSTEM: ICD-10-CM

## 2018-05-03 RX ORDER — BUDESONIDE 0.5 MG/2ML
0.5 INHALANT ORAL
Refills: 0 | Status: ACTIVE | COMMUNITY
Start: 2018-05-03

## 2018-05-04 DIAGNOSIS — Z78.9 OTHER SPECIFIED HEALTH STATUS: ICD-10-CM

## 2018-05-04 DIAGNOSIS — Z86.79 PERSONAL HISTORY OF OTHER DISEASES OF THE CIRCULATORY SYSTEM: ICD-10-CM

## 2018-05-04 DIAGNOSIS — Z12.11 ENCOUNTER FOR SCREENING FOR MALIGNANT NEOPLASM OF COLON: ICD-10-CM

## 2018-05-04 DIAGNOSIS — B18.2 CHRONIC VIRAL HEPATITIS C: ICD-10-CM

## 2018-05-04 DIAGNOSIS — G89.4 CHRONIC PAIN SYNDROME: ICD-10-CM

## 2018-05-04 DIAGNOSIS — D86.9 SARCOIDOSIS, UNSPECIFIED: ICD-10-CM

## 2018-05-04 DIAGNOSIS — D57.00 HB-SS DISEASE WITH CRISIS, UNSPECIFIED: ICD-10-CM

## 2018-08-29 PROBLEM — Z87.09 PERSONAL HISTORY OF OTHER DISEASES OF THE RESPIRATORY SYSTEM: Chronic | Status: ACTIVE | Noted: 2017-09-11

## 2018-08-29 PROBLEM — J45.909 UNSPECIFIED ASTHMA, UNCOMPLICATED: Chronic | Status: ACTIVE | Noted: 2017-09-11

## 2018-08-29 PROBLEM — I10 ESSENTIAL (PRIMARY) HYPERTENSION: Chronic | Status: ACTIVE | Noted: 2017-09-11

## 2018-08-29 PROBLEM — J44.9 CHRONIC OBSTRUCTIVE PULMONARY DISEASE, UNSPECIFIED: Chronic | Status: ACTIVE | Noted: 2017-09-11

## 2018-09-27 ENCOUNTER — APPOINTMENT (OUTPATIENT)
Dept: PULMONOLOGY | Facility: CLINIC | Age: 48
End: 2018-09-27
Payer: MEDICAID

## 2018-10-03 NOTE — CONSULT NOTE ADULT - SUBJECTIVE AND OBJECTIVE BOX
HPI:  47 year old male with a PMHx of sarcoidosis, HTN, COPD, and asthma who initially presented to the ER for severe leg spasms. He was found to have and acute R frontoparietal CVA and Afib for which he was started on Eliquis. Pt has been admitted since 9/11/17 and has had a complicated hospital course. During hospital stay he developed hemoptysis which was intervened upon by IR, however, subsequently worsened and was taken to the OR emergently for L thoracotomy, DAVID lobectomy, and evacuation of hemothorax. Post op course was complicated by SIRS requiring pressors. Pathology from OR specimen suspicious for aspergillosis and was started on voriconazole by ID. He also received a course of zosyn initially for suspected pneumonia. Was started on antiepileptics as well. Pt has had fluctuating platelet count during hospital stay, as low as 57k on 9/25 with subsequent complete recovery and then downtrend again. He was on lovenox subq (9/11-9/19) and the Hep subq (9/24-10/1). Reports no prior history of thrombocytopenia or heparin allergy.       PAST MEDICAL & SURGICAL HISTORY:  History of pneumothorax: History of 3 prior pneumonthoracies.  COPD (chronic obstructive pulmonary disease)  Asthma  Hypertension  Sarcoidosis  No significant past surgical history      Allergies    No Known Allergies    Intolerances    MEDICATIONS  (STANDING):  ALPRAZolam 0.25 milliGRAM(s) Oral every 8 hours  apixaban 5 milliGRAM(s) Oral every 12 hours  atorvastatin 80 milliGRAM(s) Oral at bedtime  buDESOnide 160 MICROgram(s)/formoterol 4.5 MICROgram(s) Inhaler 2 Puff(s) Inhalation every 12 hours  diVALproex  milliGRAM(s) Oral two times a day  docusate sodium 100 milliGRAM(s) Oral two times a day  ferrous    sulfate 325 milliGRAM(s) Oral every 8 hours  folic acid 1 milliGRAM(s) Oral daily  ipratropium    for Nebulization 500 MICROGram(s) Nebulizer every 6 hours  levalbuterol Inhalation 0.63 milliGRAM(s) Inhalation every 6 hours  methylPREDNISolone sodium succinate Injectable 20 milliGRAM(s) IV Push every 12 hours  midodrine 2.5 milliGRAM(s) Oral every 8 hours  pantoprazole    Tablet 40 milliGRAM(s) Oral two times a day before meals  polyethylene glycol 3350 17 Gram(s) Oral at bedtime  voriconazole 200 milliGRAM(s) Oral every 12 hours    MEDICATIONS  (PRN):  acetaminophen    Suspension 650 milliGRAM(s) Oral every 6 hours PRN For Temp greater than 38 C (100.4 F)  acetaminophen   Tablet. 650 milliGRAM(s) Oral every 6 hours PRN Mild Pain (1 - 3)  aluminum hydroxide/magnesium hydroxide/simethicone Suspension 30 milliLiter(s) Oral every 6 hours PRN Dyspepsia  HYDROmorphone  Injectable 1 milliGRAM(s) IV Push every 4 hours PRN Severe Pain (7 - 10)      FAMILY HISTORY:  Family history of pancreatic cancer (Father)  Family history of essential hypertension (Father)      SOCIAL HISTORY: never smoker, occasional EtOH use.     REVIEW OF SYSTEMS:    CONSTITUTIONAL: No weakness, fevers or chills  EYES/ENT: No visual changes  NECK: No pain or stiffness  RESPIRATORY: No cough or shortness of breath  CARDIOVASCULAR: No chest pain or palpitations  GASTROINTESTINAL: No abdominal or epigastric pain. No nausea, vomiting, or hematemesis; No diarrhea or constipation.   GENITOURINARY: No dysuria, frequency or hematuria  NEUROLOGICAL: + L sided weakness and tremor of L arm  SKIN: no rashes  All other review of systems is negative unless indicated above.    VITALS:   T(F): 98.7 (10-29-17 @ 12:10), Max: 98.8 (10-29-17 @ 08:59)  HR: 68 (10-29-17 @ 16:00)  BP: 121/75 (10-29-17 @ 12:10)  RR: 18 (10-29-17 @ 12:10)  SpO2: 97% (10-29-17 @ 16:00)  Wt(kg): --    PHYSICAL EXAM    GENERAL: sitting in chair comfortably  EYES: EOMI, conjunctiva and sclera clear  NECK: Supple, No JVD  CHEST/LUNG: decreased breath sounds on L, R side clear to auscultation  HEART: RRR, S1/S2  ABDOMEN: Soft, Nontender, Nondistended; Bowel sounds present  EXTREMITIES:  2+ Peripheral Pulses, No clubbing, cyanosis, or edema  NEUROLOGY: AAO x 3, L arm tremor, strength and sensations intact bilaterally   SKIN: No bruising or petechiae    LABS:                         10.4   9.53  )-----------( 67       ( 29 Oct 2017 05:43 )             33.7       10-29    144  |  100  |  23  ----------------------------<  120<H>  4.8   |  35<H>  |  0.92    Ca    9.3      29 Oct 2017 05:43  Phos  3.1     10-28  Mg     2.0     10-28    TPro  6.5  /  Alb  3.0<L>  /  TBili  0.2  /  DBili  x   /  AST  16  /  ALT  20  /  AlkPhos  68  10-29      PT/INR - ( 28 Oct 2017 02:45 )   PT: 14.9 SEC;   INR: 1.32          PTT - ( 28 Oct 2017 02:45 )  PTT:27.7 SEC    IMAGING: Reviewed bilateral normal...

## 2018-11-14 ENCOUNTER — APPOINTMENT (OUTPATIENT)
Dept: PULMONOLOGY | Facility: CLINIC | Age: 48
End: 2018-11-14
Payer: MEDICAID

## 2018-11-14 VITALS
WEIGHT: 186 LBS | BODY MASS INDEX: 26.04 KG/M2 | TEMPERATURE: 97.8 F | DIASTOLIC BLOOD PRESSURE: 90 MMHG | RESPIRATION RATE: 16 BRPM | OXYGEN SATURATION: 100 % | HEIGHT: 71 IN | SYSTOLIC BLOOD PRESSURE: 131 MMHG | HEART RATE: 211 BPM

## 2018-11-14 PROCEDURE — 99204 OFFICE O/P NEW MOD 45 MIN: CPT

## 2019-01-25 NOTE — ED PROVIDER NOTE - FAMILY HISTORY
Father  Still living? No  Family history of essential hypertension, Age at diagnosis: Age Unknown  Family history of pancreatic cancer, Age at diagnosis: Age Unknown
Universal Safety Interventions

## 2019-02-19 ENCOUNTER — APPOINTMENT (OUTPATIENT)
Dept: THORACIC SURGERY | Facility: CLINIC | Age: 49
End: 2019-02-19
Payer: MEDICAID

## 2019-02-19 VITALS
SYSTOLIC BLOOD PRESSURE: 144 MMHG | OXYGEN SATURATION: 98 % | TEMPERATURE: 98.4 F | HEART RATE: 101 BPM | DIASTOLIC BLOOD PRESSURE: 87 MMHG | RESPIRATION RATE: 18 BRPM

## 2019-02-19 DIAGNOSIS — Z12.11 ENCOUNTER FOR SCREENING FOR MALIGNANT NEOPLASM OF COLON: ICD-10-CM

## 2019-02-19 PROCEDURE — 99214 OFFICE O/P EST MOD 30 MIN: CPT

## 2019-02-20 PROBLEM — Z12.11 COLON CANCER SCREENING: Status: RESOLVED | Noted: 2018-05-03 | Resolved: 2019-02-20

## 2019-02-21 NOTE — ASSESSMENT
[FreeTextEntry1] : 49 y/o male, with Pmhx of sarcoidosis, aspergillosis, asthma/ COPD, PFO s/o CVA with residual left hemiplegia, required emergent DAVID lobectomy and IR embolization of left bronchial artery secondary to hemoptysis in 11/2017. He is s/p tracheostomy on 11/24/17 due to unsuccessfully weaned off the vent in the CT ICU. He was d/c to rehab. \par \par Tracheostomy is still in place, he is on O2 2L/ NC. He has has repeated infection secondary to the trach and is being evaluated by ENT to have tracheostomy removed. \par As noted, he has evaluated for lung transplant by [facility] few years ago, was not candidate at that time due to suboptimal heart function. \par \par He presents today to discuss possible removal of tracheostomy. \par \par I have reviewed the patient's medical records and diagnostic images at the time of this office consultation and have made the following recommendation.\par Plan:\par 1. CT chest w/o contrast. \par 2. RTC in 3-4 weeks with above CT chest done. \par \par \par \par \par Written by Sarina Golden NP, acting as a scribe for Dr. Rodrigue Caldera.     \par “The documentation recorded by the scribe accurately reflects the service I personally performed and the decisions made by me.” Rodrigue Caldera MD.\par \par

## 2019-02-21 NOTE — PHYSICAL EXAM
[Sclera] : the sclera and conjunctiva were normal [PERRL With Normal Accommodation] : pupils were equal in size, round, and reactive to light [Respiration, Rhythm And Depth] : normal respiratory rhythm and effort [Auscultation Breath Sounds / Voice Sounds] : lungs were clear to auscultation bilaterally [Heart Rate And Rhythm] : heart rate was normal and rhythm regular [Heart Sounds] : normal S1 and S2 [Examination Of The Chest] : the chest was normal in appearance [2+] : left 2+ [No Abnormalities] : the abdominal aorta was not enlarged and no bruit was heard [No Pulse Delay] : no pulse delay [No Pulse Discrepancy] : no pulse discrepancy detected [Full Pulse] : peripheral pulses were full [Bowel Sounds] : normal bowel sounds [Abdomen Soft] : soft [Abdomen Tenderness] : non-tender [Cervical Lymph Nodes Enlarged Posterior Bilaterally] : posterior cervical [Cervical Lymph Nodes Enlarged Anterior Bilaterally] : anterior cervical [No CVA Tenderness] : no ~M costovertebral angle tenderness [Involuntary Movements] : no involuntary movements were seen [Skin Color & Pigmentation] : normal skin color and pigmentation [Skin Turgor] : normal skin turgor [] : no rash [Oriented To Time, Place, And Person] : oriented to person, place, and time [Affect] : the affect was normal [Mood] : the mood was normal [Fingers] :  capillary refill of the fingers was normal [Varicose Veins Of The Right Leg] : the patient has no varicose veins of the right leg [Varicose Veins Of The Left Leg] : the patient has no varicose veins of the left leg [FreeTextEntry1] : deferred

## 2019-02-21 NOTE — HISTORY OF PRESENT ILLNESS
[FreeTextEntry1] : 49 y/o male, with Pmhx of sarcoidosis, aspergillosis, asthma/ COPD, PFO s/o CVA with residual left hemiplegia, required emergent DAVID lobectomy and IR embolization of left bronchial artery secondary to hemoptysis in 11/2017. He is s/p tracheostomy on 11/24/17 due to unsuccessfully weaned off the vent in the CT ICU. He was d/c to rehab. \par \par Tracheostomy is still in place, he is on O2 2L/ NC. He has has repeated infection secondary to the trach and is being evaluated by ENT to have tracheostomy removed. \par As noted, he has evaluated for lung transplant by [facility] few years ago, was not candidate at that time due to suboptimal heart function. \par \par He presents today for a followup visit. He admits SOB on exertion. The patient denies cough, chest pain, hemoptysis, palpitation, fever, recent illness, hospitalization and significant weight loss.\par

## 2019-02-21 NOTE — ASSESSMENT
[FreeTextEntry1] : 47 y/o male, with Pmhx of sarcoidosis, aspergillosis, asthma/ COPD, PFO s/o CVA with residual left hemiplegia, required emergent DAVID lobectomy and IR embolization of left bronchial artery secondary to hemoptysis in 11/2017. He is s/p tracheostomy on 11/24/17 due to unsuccessfully weaned off the vent in the CT ICU. He was d/c to rehab. \par \par Tracheostomy is still in place, he is on O2 2L/ NC. He has has repeated infection secondary to the trach and is being evaluated by ENT to have tracheostomy removed. \par As noted, he has evaluated for lung transplant by [facility] few years ago, was not candidate at that time due to suboptimal heart function. \par \par He presents today to discuss possible removal of tracheostomy. \par \par I have reviewed the patient's medical records and diagnostic images at the time of this office consultation and have made the following recommendation.\par Plan:\par 1. CT chest w/o contrast. \par 2. RTC in 3-4 weeks with above CT chest done. \par \par \par \par \par Written by Sarina Golden NP, acting as a scribe for Dr. Rodrigue Caldera.     \par “The documentation recorded by the scribe accurately reflects the service I personally performed and the decisions made by me.” Rodrigue Caldera MD.\par \par

## 2019-02-21 NOTE — HISTORY OF PRESENT ILLNESS
[FreeTextEntry1] : 47 y/o male, with Pmhx of sarcoidosis, aspergillosis, asthma/ COPD, PFO s/o CVA with residual left hemiplegia, required emergent DAVID lobectomy and IR embolization of left bronchial artery secondary to hemoptysis in 11/2017. He is s/p tracheostomy on 11/24/17 due to unsuccessfully weaned off the vent in the CT ICU. He was d/c to rehab. \par \par Tracheostomy is still in place, he is on O2 2L/ NC. He has has repeated infection secondary to the trach and is being evaluated by ENT to have tracheostomy removed. \par As noted, he has evaluated for lung transplant by [facility] few years ago, was not candidate at that time due to suboptimal heart function. \par \par He presents today for a followup visit. He admits SOB on exertion. The patient denies cough, chest pain, hemoptysis, palpitation, fever, recent illness, hospitalization and significant weight loss.\par

## 2019-03-18 ENCOUNTER — APPOINTMENT (OUTPATIENT)
Dept: THORACIC SURGERY | Facility: CLINIC | Age: 49
End: 2019-03-18
Payer: MEDICAID

## 2019-03-18 VITALS
SYSTOLIC BLOOD PRESSURE: 138 MMHG | WEIGHT: 210 LBS | HEART RATE: 105 BPM | OXYGEN SATURATION: 97 % | DIASTOLIC BLOOD PRESSURE: 91 MMHG | BODY MASS INDEX: 29.4 KG/M2 | TEMPERATURE: 98.2 F | HEIGHT: 71 IN | RESPIRATION RATE: 18 BRPM

## 2019-03-18 DIAGNOSIS — B44.9 ASPERGILLOSIS, UNSPECIFIED: ICD-10-CM

## 2019-03-18 PROCEDURE — 99215 OFFICE O/P EST HI 40 MIN: CPT

## 2019-03-18 RX ORDER — ALBUTEROL SULFATE 1.25 MG/3ML
1.25 SOLUTION RESPIRATORY (INHALATION)
Qty: 75 | Refills: 0 | Status: ACTIVE | COMMUNITY
Start: 2018-11-26

## 2019-03-18 RX ORDER — IPRATROPIUM BROMIDE 0.5 MG/2.5ML
0.02 SOLUTION RESPIRATORY (INHALATION)
Qty: 62 | Refills: 0 | Status: ACTIVE | COMMUNITY
Start: 2018-12-04

## 2019-03-18 RX ORDER — SPIRONOLACTONE 25 MG/1
25 TABLET ORAL
Qty: 30 | Refills: 0 | Status: ACTIVE | COMMUNITY
Start: 2018-11-09

## 2019-03-18 RX ORDER — GLYCOPYRROLATE 25 UG/ML
25 SOLUTION RESPIRATORY (INHALATION)
Qty: 60 | Refills: 0 | Status: ACTIVE | COMMUNITY
Start: 2019-03-11

## 2019-03-18 RX ORDER — ASPIRIN 81 MG/1
81 TABLET ORAL
Qty: 15 | Refills: 0 | Status: ACTIVE | COMMUNITY
Start: 2018-09-28

## 2019-03-18 RX ORDER — UMECLIDINIUM 62.5 UG/1
62.5 AEROSOL, POWDER ORAL
Qty: 30 | Refills: 0 | Status: ACTIVE | COMMUNITY
Start: 2018-09-28

## 2019-03-18 RX ORDER — ALBUTEROL SULFATE 90 UG/1
108 (90 BASE) AEROSOL, METERED RESPIRATORY (INHALATION)
Qty: 18 | Refills: 0 | Status: ACTIVE | COMMUNITY
Start: 2018-09-27

## 2019-03-18 RX ORDER — PREDNISONE 20 MG/1
20 TABLET ORAL
Refills: 0 | Status: DISCONTINUED | COMMUNITY
Start: 2018-05-03 | End: 2019-03-18

## 2019-03-18 RX ORDER — KETOROLAC TROMETHAMINE 5 MG/ML
0.5 SOLUTION OPHTHALMIC
Qty: 5 | Refills: 0 | Status: ACTIVE | COMMUNITY
Start: 2019-01-15

## 2019-03-18 RX ORDER — ALBUTEROL SULFATE 2.5 MG/3ML
(2.5 MG/3ML) SOLUTION RESPIRATORY (INHALATION)
Refills: 0 | Status: DISCONTINUED | COMMUNITY
Start: 2018-05-03 | End: 2019-03-18

## 2019-03-18 RX ORDER — BUDESONIDE AND FORMOTEROL FUMARATE DIHYDRATE 160; 4.5 UG/1; UG/1
160-4.5 AEROSOL RESPIRATORY (INHALATION)
Qty: 10 | Refills: 0 | Status: ACTIVE | COMMUNITY
Start: 2019-02-16

## 2019-03-18 RX ORDER — METOPROLOL SUCCINATE 25 MG/1
25 TABLET, EXTENDED RELEASE ORAL
Qty: 30 | Refills: 0 | Status: ACTIVE | COMMUNITY
Start: 2019-03-10

## 2019-03-18 RX ORDER — LOSARTAN POTASSIUM 25 MG/1
25 TABLET, FILM COATED ORAL
Qty: 30 | Refills: 0 | Status: ACTIVE | COMMUNITY
Start: 2019-03-10

## 2019-03-18 NOTE — PHYSICAL EXAM
[Sclera] : the sclera and conjunctiva were normal [Extraocular Movements] : extraocular movements were intact [Neck Appearance] : the appearance of the neck was normal [Jugular Venous Distention Increased] : there was no jugular-venous distention [Neck Cervical Mass (___cm)] : no neck mass was observed [] : no respiratory distress [Respiration, Rhythm And Depth] : normal respiratory rhythm and effort [Exaggerated Use Of Accessory Muscles For Inspiration] : no accessory muscle use [Auscultation Breath Sounds / Voice Sounds] : lungs were clear to auscultation bilaterally [Diminished Respiratory Excursion] : normal chest expansion [Heart Rate And Rhythm] : heart rate was normal and rhythm regular [Bowel Sounds] : normal bowel sounds [Abdomen Soft] : soft [Cervical Lymph Nodes Enlarged Posterior Bilaterally] : posterior cervical [Abdomen Tenderness] : non-tender [Cervical Lymph Nodes Enlarged Anterior Bilaterally] : anterior cervical [Supraclavicular Lymph Nodes Enlarged Bilaterally] : supraclavicular [Skin Color & Pigmentation] : normal skin color and pigmentation [Involuntary Movements] : no involuntary movements were seen [Oriented To Time, Place, And Person] : oriented to person, place, and time [Impaired Insight] : insight and judgment were intact [Affect] : the affect was normal [Mood] : the mood was normal [FreeTextEntry1] : Left sided weakness from past CVA.

## 2019-03-18 NOTE — CONSULT LETTER
[Dear  ___] : Dear  [unfilled], [Sincerely,] : Sincerely, [Courtesy Letter:] : I had the pleasure of seeing your patient, [unfilled], in my office today. [Please see my note below.] : Please see my note below. [FreeTextEntry2] : Dr. Benavides [FreeTextEntry3] : \par Rodrigue Caldera MD \par Attending Surgeon \par Division of Thoracic Surgery \par , Cardiovascular and Thoracic Surgery \par [facility] \par

## 2019-03-18 NOTE — HISTORY OF PRESENT ILLNESS
[FreeTextEntry1] : 49 y/o male, with Pmhx of sarcoidosis, aspergillosis, asthma/ COPD, PFO s/o CVA with residual left hemiplegia, required emergent DAVID lobectomy and IR embolization of left bronchial artery secondary to hemoptysis in 11/2017. He is s/p tracheostomy on 11/24/17 due to unsuccessfully weaned off the vent in the CTICU. He was d/c to rehab.  As noted, he has evaluated for lung transplant by [facility] few years ago, was not candidate at that time due to suboptimal heart function. \par \par CT chest scan 3/12/19\par - Multiple air-filled cystic spaces both lung fields consistent with pulmonary cyst or blebs with largest cyst DAVID, no change. \par -No change in interstitial lung markings.\par -No pulmonary mass, pleural effusion, pneumothorax, or enlarged lymph nodes. \par -Tracheostomy tube in place. \par \par Tracheostomy is still in place, he is on O2 1L/ NC. He has has repeated infection secondary to the trach and is being evaluated by ENT to have tracheostomy removed.The patient reports recent URI with symptoms of fever and cough treated with Abx. Symptoms have resolved as per patient. Stable shortness of breath with exertion. He reports a good appetite; no difficulty swallowing. The patient denies fever, chills, dysphagia or hemoptysis. \par

## 2019-03-18 NOTE — REVIEW OF SYSTEMS
[As Noted in HPI] : as noted in HPI [Limb Weakness] : limb weakness [Negative] : Heme/Lymph [de-identified] : Hx of CVA. Left sided weakness.

## 2019-03-28 NOTE — PROGRESS NOTE ADULT - PROBLEM SELECTOR PROBLEM 5
Pt presents per EMS from home with shortness of breath. Pt has HX of COPD and lung cancer and was recently discharged from hospital for same complaints. Pt states he has been coughing up yellow discharge over the last few days. Pt also states he has some chest discomfort upon exertion.    Hypertension

## 2019-03-29 ENCOUNTER — APPOINTMENT (OUTPATIENT)
Dept: PULMONOLOGY | Facility: CLINIC | Age: 49
End: 2019-03-29
Payer: MEDICAID

## 2019-03-29 VITALS — HEIGHT: 71 IN | WEIGHT: 211 LBS | BODY MASS INDEX: 29.54 KG/M2

## 2019-03-29 VITALS
BODY MASS INDEX: 40.78 KG/M2 | SYSTOLIC BLOOD PRESSURE: 175 MMHG | TEMPERATURE: 97.7 F | WEIGHT: 216 LBS | RESPIRATION RATE: 18 BRPM | OXYGEN SATURATION: 96 % | DIASTOLIC BLOOD PRESSURE: 112 MMHG | HEIGHT: 61 IN | HEART RATE: 88 BPM

## 2019-03-29 DIAGNOSIS — Z93.1 GASTROSTOMY STATUS: ICD-10-CM

## 2019-03-29 PROCEDURE — 94060 EVALUATION OF WHEEZING: CPT

## 2019-03-29 PROCEDURE — ZZZZZ: CPT

## 2019-03-29 PROCEDURE — 94729 DIFFUSING CAPACITY: CPT

## 2019-03-29 PROCEDURE — 99214 OFFICE O/P EST MOD 30 MIN: CPT | Mod: 25

## 2019-03-29 PROCEDURE — 94726 PLETHYSMOGRAPHY LUNG VOLUMES: CPT

## 2019-03-29 NOTE — REVIEW OF SYSTEMS
[Sputum] : sputum  [As Noted in HPI] : as noted in HPI [Negative] : Endocrine [FreeTextEntry6] : left sided weakness [de-identified] : s/p CVA with residuals

## 2019-03-29 NOTE — HISTORY OF PRESENT ILLNESS
[FreeTextEntry1] : The patient has sarcoidosis, aspergillosis, asthma/COPD, PFO s/p CVA with residual left hemiplegia required emergent left upper lobectomy and IR embolization of left bronchial artery secondary to hemoptysis in 11/2017. 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  He reports that he has had repeated infections secondary to the tracheostomy and wants to be evaluated to have tracheostomy removed. He reports roughly 10 respiratory infections since trach was placed and most recently, he was discharged from a hospital in [place] on Tuesday on azithromycin/medrol. He currently sees Dr. Mixon, a pulmonologist in [place], but he is unhappy and seeking to establish care at our office.\par \par Trach is a 6 cuffless Shiley. He reports suctioning one to two times daily, with thin clear secretions when he is not sick. He and his wife are convinced that trach is cause of infections, although they were trained in sterile technique and state that they are compliant with it. He is currently taking Symbicort and Incruse PO along with albuterol via nebulizer three times daily. He states that he also takes the nebulized solution PO. He also takes a baseline of 10mg prednisone, which he states that he is supposed to decrease to 5mg after finishing medrol pack. He is on 1LNC at rest, 2LNC with activity at home but occasionally goes off O2 altogether when he is at rest.\par \par PFT today demonstrates severe obstruction with positive bronchodilator response, air trapping, and reduced DLCO.\par \par He was worked up by a cardiologist and has some LVD, but does not follow with the cardiologist. He is currently on metoprolol and losartan.

## 2019-03-29 NOTE — ASSESSMENT
[FreeTextEntry1] : Patient tolerate ambulation without desaturation. He appears functional and it is possible he will be able to tolerate trach decannulation at this time. He was advised to seek care at [facility] the next time he has a respiratory infection so that our team can formally evaluate him for decannulation. Dr. Caldera from [facility] saw him on 3/18 and endorsed decannulation.\par \par Patient has severe obstructive lung disease. Advised patient that taking inhalers and nebulizers PO is probably not helpful, as most of the inhaled powder will get stuck on/exit the tracheostomy. Advised to use attachment device for symbicort MDI and to d/c Incruse. Continue nebulizer treatments as needed, but done via trach as opposed to PO. Unfortunately patient is not a candidate for lung transplant as he was evaluated and denied by [facility] a few years ago due to suboptimal heart function.\par \par Patient should consider reevaluation for lung transplant in future, perhaps at [facility]?\par Return to office in 1 month to see Dr. Benavides.

## 2019-03-29 NOTE — PHYSICAL EXAM
[General Appearance - In No Acute Distress] : no acute distress [Heart Rate And Rhythm] : heart rate and rhythm were normal [Exaggerated Use Of Accessory Muscles For Inspiration] : no accessory muscle use [No Focal Deficits] : no focal deficits [Oriented To Time, Place, And Person] : oriented to person, place, and time [Affect] : the affect was normal [Mood] : the mood was normal [Skin Color & Pigmentation] : normal skin color and pigmentation [Abdomen Soft] : soft [Abdomen Tenderness] : non-tender [] : no hepato-splenomegaly [Abdomen Mass (___ Cm)] : no abdominal mass palpated [FreeTextEntry1] : wheelchair bound, but able to ambulate with assist [Redness] : no redness

## 2019-04-01 ENCOUNTER — INPATIENT (INPATIENT)
Facility: HOSPITAL | Age: 49
LOS: 7 days | Discharge: HOME HEALTH | End: 2019-04-09
Attending: INTERNAL MEDICINE | Admitting: INTERNAL MEDICINE
Payer: MEDICAID

## 2019-04-01 VITALS
RESPIRATION RATE: 18 BRPM | DIASTOLIC BLOOD PRESSURE: 97 MMHG | HEART RATE: 57 BPM | OXYGEN SATURATION: 98 % | TEMPERATURE: 98 F | SYSTOLIC BLOOD PRESSURE: 161 MMHG

## 2019-04-01 DIAGNOSIS — R04.2 HEMOPTYSIS: ICD-10-CM

## 2019-04-01 DIAGNOSIS — I48.0 PAROXYSMAL ATRIAL FIBRILLATION: ICD-10-CM

## 2019-04-01 DIAGNOSIS — Z98.890 OTHER SPECIFIED POSTPROCEDURAL STATES: Chronic | ICD-10-CM

## 2019-04-01 DIAGNOSIS — D86.9 SARCOIDOSIS, UNSPECIFIED: ICD-10-CM

## 2019-04-01 DIAGNOSIS — I10 ESSENTIAL (PRIMARY) HYPERTENSION: ICD-10-CM

## 2019-04-01 LAB
ALBUMIN SERPL ELPH-MCNC: 3.7 G/DL — SIGNIFICANT CHANGE UP (ref 3.3–5)
ALP SERPL-CCNC: 53 U/L — SIGNIFICANT CHANGE UP (ref 40–120)
ALT FLD-CCNC: 12 U/L — SIGNIFICANT CHANGE UP (ref 4–41)
ANION GAP SERPL CALC-SCNC: 10 MMO/L — SIGNIFICANT CHANGE UP (ref 7–14)
APTT BLD: 28.4 SEC — SIGNIFICANT CHANGE UP (ref 27.5–36.3)
AST SERPL-CCNC: 19 U/L — SIGNIFICANT CHANGE UP (ref 4–40)
B PERT DNA SPEC QL NAA+PROBE: NOT DETECTED — SIGNIFICANT CHANGE UP
BASOPHILS # BLD AUTO: 0.04 K/UL — SIGNIFICANT CHANGE UP (ref 0–0.2)
BASOPHILS NFR BLD AUTO: 0.4 % — SIGNIFICANT CHANGE UP (ref 0–2)
BILIRUB SERPL-MCNC: 0.2 MG/DL — SIGNIFICANT CHANGE UP (ref 0.2–1.2)
BUN SERPL-MCNC: 17 MG/DL — SIGNIFICANT CHANGE UP (ref 7–23)
C PNEUM DNA SPEC QL NAA+PROBE: NOT DETECTED — SIGNIFICANT CHANGE UP
CALCIUM SERPL-MCNC: 9.6 MG/DL — SIGNIFICANT CHANGE UP (ref 8.4–10.5)
CHLORIDE SERPL-SCNC: 100 MMOL/L — SIGNIFICANT CHANGE UP (ref 98–107)
CO2 SERPL-SCNC: 34 MMOL/L — HIGH (ref 22–31)
CREAT SERPL-MCNC: 1.11 MG/DL — SIGNIFICANT CHANGE UP (ref 0.5–1.3)
EOSINOPHIL # BLD AUTO: 0.09 K/UL — SIGNIFICANT CHANGE UP (ref 0–0.5)
EOSINOPHIL NFR BLD AUTO: 0.8 % — SIGNIFICANT CHANGE UP (ref 0–6)
FLUAV H1 2009 PAND RNA SPEC QL NAA+PROBE: NOT DETECTED — SIGNIFICANT CHANGE UP
FLUAV H1 RNA SPEC QL NAA+PROBE: NOT DETECTED — SIGNIFICANT CHANGE UP
FLUAV H3 RNA SPEC QL NAA+PROBE: NOT DETECTED — SIGNIFICANT CHANGE UP
FLUAV SUBTYP SPEC NAA+PROBE: NOT DETECTED — SIGNIFICANT CHANGE UP
FLUBV RNA SPEC QL NAA+PROBE: NOT DETECTED — SIGNIFICANT CHANGE UP
GLUCOSE SERPL-MCNC: 97 MG/DL — SIGNIFICANT CHANGE UP (ref 70–99)
HADV DNA SPEC QL NAA+PROBE: NOT DETECTED — SIGNIFICANT CHANGE UP
HCOV PNL SPEC NAA+PROBE: SIGNIFICANT CHANGE UP
HCT VFR BLD CALC: 40.9 % — SIGNIFICANT CHANGE UP (ref 39–50)
HGB BLD-MCNC: 11.9 G/DL — LOW (ref 13–17)
HMPV RNA SPEC QL NAA+PROBE: NOT DETECTED — SIGNIFICANT CHANGE UP
HPIV1 RNA SPEC QL NAA+PROBE: NOT DETECTED — SIGNIFICANT CHANGE UP
HPIV2 RNA SPEC QL NAA+PROBE: NOT DETECTED — SIGNIFICANT CHANGE UP
HPIV3 RNA SPEC QL NAA+PROBE: NOT DETECTED — SIGNIFICANT CHANGE UP
HPIV4 RNA SPEC QL NAA+PROBE: NOT DETECTED — SIGNIFICANT CHANGE UP
IMM GRANULOCYTES NFR BLD AUTO: 2.1 % — HIGH (ref 0–1.5)
INR BLD: 1.09 — SIGNIFICANT CHANGE UP (ref 0.88–1.17)
LYMPHOCYTES # BLD AUTO: 1.67 K/UL — SIGNIFICANT CHANGE UP (ref 1–3.3)
LYMPHOCYTES # BLD AUTO: 14.9 % — SIGNIFICANT CHANGE UP (ref 13–44)
MCHC RBC-ENTMCNC: 27.2 PG — SIGNIFICANT CHANGE UP (ref 27–34)
MCHC RBC-ENTMCNC: 29.1 % — LOW (ref 32–36)
MCV RBC AUTO: 93.4 FL — SIGNIFICANT CHANGE UP (ref 80–100)
MONOCYTES # BLD AUTO: 0.86 K/UL — SIGNIFICANT CHANGE UP (ref 0–0.9)
MONOCYTES NFR BLD AUTO: 7.7 % — SIGNIFICANT CHANGE UP (ref 2–14)
NEUTROPHILS # BLD AUTO: 8.3 K/UL — HIGH (ref 1.8–7.4)
NEUTROPHILS NFR BLD AUTO: 74.1 % — SIGNIFICANT CHANGE UP (ref 43–77)
NRBC # FLD: 0 K/UL — SIGNIFICANT CHANGE UP (ref 0–0)
PLATELET # BLD AUTO: 173 K/UL — SIGNIFICANT CHANGE UP (ref 150–400)
PMV BLD: 12.3 FL — SIGNIFICANT CHANGE UP (ref 7–13)
POTASSIUM SERPL-MCNC: 4.5 MMOL/L — SIGNIFICANT CHANGE UP (ref 3.5–5.3)
POTASSIUM SERPL-SCNC: 4.5 MMOL/L — SIGNIFICANT CHANGE UP (ref 3.5–5.3)
PROT SERPL-MCNC: 6.9 G/DL — SIGNIFICANT CHANGE UP (ref 6–8.3)
PROTHROM AB SERPL-ACNC: 12.1 SEC — SIGNIFICANT CHANGE UP (ref 9.8–13.1)
RBC # BLD: 4.38 M/UL — SIGNIFICANT CHANGE UP (ref 4.2–5.8)
RBC # FLD: 15.2 % — HIGH (ref 10.3–14.5)
RSV RNA SPEC QL NAA+PROBE: NOT DETECTED — SIGNIFICANT CHANGE UP
RV+EV RNA SPEC QL NAA+PROBE: NOT DETECTED — SIGNIFICANT CHANGE UP
SODIUM SERPL-SCNC: 144 MMOL/L — SIGNIFICANT CHANGE UP (ref 135–145)
WBC # BLD: 11.19 K/UL — HIGH (ref 3.8–10.5)
WBC # FLD AUTO: 11.19 K/UL — HIGH (ref 3.8–10.5)

## 2019-04-01 PROCEDURE — 99223 1ST HOSP IP/OBS HIGH 75: CPT

## 2019-04-01 PROCEDURE — 71046 X-RAY EXAM CHEST 2 VIEWS: CPT | Mod: 26

## 2019-04-01 PROCEDURE — 70360 X-RAY EXAM OF NECK: CPT | Mod: 26

## 2019-04-01 RX ORDER — TIOTROPIUM BROMIDE 18 UG/1
1 CAPSULE ORAL; RESPIRATORY (INHALATION) DAILY
Qty: 0 | Refills: 0 | Status: DISCONTINUED | OUTPATIENT
Start: 2019-04-01 | End: 2019-04-02

## 2019-04-01 RX ORDER — METOPROLOL TARTRATE 50 MG
25 TABLET ORAL DAILY
Qty: 0 | Refills: 0 | Status: DISCONTINUED | OUTPATIENT
Start: 2019-04-01 | End: 2019-04-09

## 2019-04-01 RX ORDER — IPRATROPIUM/ALBUTEROL SULFATE 18-103MCG
3 AEROSOL WITH ADAPTER (GRAM) INHALATION EVERY 4 HOURS
Qty: 0 | Refills: 0 | Status: DISCONTINUED | OUTPATIENT
Start: 2019-04-01 | End: 2019-04-02

## 2019-04-01 RX ORDER — ASPIRIN/CALCIUM CARB/MAGNESIUM 324 MG
81 TABLET ORAL DAILY
Qty: 0 | Refills: 0 | Status: DISCONTINUED | OUTPATIENT
Start: 2019-04-01 | End: 2019-04-09

## 2019-04-01 RX ORDER — LOSARTAN POTASSIUM 100 MG/1
25 TABLET, FILM COATED ORAL DAILY
Qty: 0 | Refills: 0 | Status: DISCONTINUED | OUTPATIENT
Start: 2019-04-01 | End: 2019-04-09

## 2019-04-01 NOTE — H&P ADULT - NSICDXPASTMEDICALHX_GEN_ALL_CORE_FT
PAST MEDICAL HISTORY:  Asthma     COPD (chronic obstructive pulmonary disease)     History of pneumothorax History of 3 prior pneumonthoracies.    Hypertension     Sarcoidosis

## 2019-04-01 NOTE — H&P ADULT - NSHPREVIEWOFSYSTEMS_GEN_ALL_CORE
Constitutional Symptoms: No weakness, fevers, chills, weight loss  Eyes: No visual changes, headache, eye pain, double vision  Ears, Nose, Mouth, Throat: No runny nose, sinus pain, ear pain, tinnitus, sore throat, dysphagia, odynophagia  Cardiovascular: No chest pain, palpitations, edema  Respiratory: +cough/hemoptysis, no wheezing, shortness of breath  Gastrointestinal: No abdominal pain, dysphagia, anorexia, nausea/vomiting, diarrhea/constipation, hematemesis, BRBPR, melena  Genitourinary: No dysuria, frequency, hematuria  Musculoskeletal: No joint pain, joint swelling, decreased ROM  Skin: No pruritus, rashes, lesions, wounds  Neurologic:  No seizures, headache, paraesthesia, numbness, limb weakness    Positives and pertinent negatives noted and all other systems negative.

## 2019-04-01 NOTE — ED PROVIDER NOTE - ATTENDING CONTRIBUTION TO CARE
I performed a face to face bedside interview with patient regarding history of present illness, review of symptoms and past medical history. I completed an independent physical exam.  I have discussed patient's plan of care.   I agree with note as stated above, having amended the EMR as needed to reflect my findings. I have discussed the assessment and plan of care.  This includes during the time I functioned as the attending physician for this patient.  Attending Contribution to Care: agree with plan of resident. 48m w hx htn, pfo, sarcoidosis, aspergillosis, asthma, copd, s/p trach '17 on 2L NC at home, afib not on a/c due to hemoptysis, left upper lobe lobectomy, here for persistent sputum production tinged w blood, unchanged recently. Appears well in no resp distress, on 2L NC as per home regimen saturating well. Trach site appears clean w/o obvious sputum or blood visualized. Breath sounds clear. Low susp for acute resp pathology. Given pt's complex pulm hx will discuss w pulm, also check basics and cxr, and reassess. pt likely admit with no active acute complaints at this point. vsss.

## 2019-04-01 NOTE — ED PROVIDER NOTE - OBJECTIVE STATEMENT
48m w hx htn, pfo, sarcoidosis, aspergillosis, asthma, copd, s/p trach '17 on 2L NC at home, afib not on a/c due to hemoptysis, left upper lobe lobectomy, here for persistent sputum production tinged w blood. Pt states has had these sx for some time. Was recently admitted at a [facility] for same complaint for 1 wk and was discharged home on abx (don't know which) that were recently completed. Sputum production has persisted so pt decided to come in. Denies increase in this sx or in extent of cough, which has chronically. No SOB or cp. No fever. No nasal congestion or sore throat. No sick contacts.    pulm - mihai gonzalez [phone]

## 2019-04-01 NOTE — ED PROVIDER NOTE - CLINICAL SUMMARY MEDICAL DECISION MAKING FREE TEXT BOX
48m w hx htn, pfo, sarcoidosis, aspergillosis, asthma, copd, s/p trach '17 on 2L NC at home, afib not on a/c due to hemoptysis, left upper lobe lobectomy, here for persistent sputum production tinged w blood, unchanged recently. Appears well in no resp distress, on 2L NC as per home regimen saturating well. Trach site appears clean w/o obvious sputum or blood visualized. Breath sounds clear. Low susp for acute resp pathology. Given pt's complex pulm hx will discuss w pulm, also check basics and cxr, and reassess  see progress notes for further clinical course

## 2019-04-01 NOTE — H&P ADULT - NSICDXPASTSURGICALHX_GEN_ALL_CORE_FT
PAST SURGICAL HISTORY:  History of thoracotomy 9/24/17 Left thoracotomy, Left upper lobectomy   9/25/17 Reop left thoracotomy, evacuation of left hemothorax

## 2019-04-01 NOTE — H&P ADULT - ASSESSMENT
respiratory failure (weaned   off ventilator 2/18 no on 2L NC at home) and s/p PEG 12/18, broncho-  pulmonary fistula, thyroid arterial pesuodaneyrusm, Crecurrent hemoptysis), CVA w/ L hemiplegia (9/17), persistent hemoptysis 2/2 DAVID bleed s/p   thoracotomy with DAVID lung lobectomy on 9/24/17 s/p IR embolization of Left   bronchial artery on 11/16/17 complicated by pneumothorax requiring chest   tube, s/p tracheostomy 11/17 for acute hypoxic respiratory failure (weaned   off ventilator 2/18 no on 2L NC at home) and s/p PEG 12/18, broncho-  pulmonary fistula, thyroid arterial pesuodaneyrusm, C diff p/w hemoptysis. 48 M PMH as above p/w hemoptysis.

## 2019-04-01 NOTE — CHART NOTE - NSCHARTNOTEFT_GEN_A_CORE
Patient known to pulmonary service.   Patient with chronic trach collar.   RVP is negative, no need for isolation.    Accepted for admission to RCU    Avery Almendarez MD  Pulmonary/Critical Care Fellow  page: [phone]

## 2019-04-01 NOTE — ED PROVIDER NOTE - PROGRESS NOTE DETAILS
Elizabeth Goldberger PGY-2: spoke to pulm fellow, stated would talk to pulm attg [place] and call back Elizabeth Goldberger PGY-2: Elizabeth Goldberger PGY-2: spoke to pulm fellow, stated would talk to pulm attg [place] and call back. Also requested rvp in case acute URI contributing to sx Elizabeth Goldberger PGY-2: pulm not yet discussed w attending, but based on pt hx and her review of outpt records pt may benefit from RCU adm - has been some discussion of trach removal per documents. Will discuss further once more results back. Currently no RCU beds avail but may be later [place] PGY2: Pt signed out to me pending pcu bed, spoke to pulm fellow, has bed now, can admit under Gita Lisker, pt aware of admission, pt in stablel condition, well appearing on nasal cannula

## 2019-04-01 NOTE — ED ADULT NURSE NOTE - NSIMPLEMENTINTERV_GEN_ALL_ED
Implemented All Universal Safety Interventions:  [place] to call system. Call bell, personal items and telephone within reach. Instruct patient to call for assistance. Room bathroom lighting operational. Non-slip footwear when patient is off stretcher. Physically safe environment: no spills, clutter or unnecessary equipment. Stretcher in lowest position, wheels locked, appropriate side rails in place.

## 2019-04-01 NOTE — ED ADULT NURSE NOTE - OBJECTIVE STATEMENT
patient alert ox3 came in c/o coughing up with some blood streaks. denies fever/chills. denies CP/sob. NAD. trach noted to room air. labs  done as ordered. awaiting results and re eval.

## 2019-04-01 NOTE — H&P ADULT - NSHPLABSRESULTS_GEN_ALL_CORE
04-01    144  |  100  |  17  ----------------------------<  97  4.5   |  34<H>  |  1.11    Ca    9.6      01 Apr 2019 15:55    TPro  6.9  /  Alb  3.7  /  TBili  0.2  /  DBili  x   /  AST  19  /  ALT  12  /  AlkPhos  53  04-01                            11.9   11.19 )-----------( 173      ( 01 Apr 2019 15:55 )             40.9             LIVER FUNCTIONS - ( 01 Apr 2019 15:55 )  Alb: 3.7 g/dL / Pro: 6.9 g/dL / ALK PHOS: 53 u/L / ALT: 12 u/L / AST: 19 u/L / GGT: x             PT/INR - ( 01 Apr 2019 15:55 )   PT: 12.1 SEC;   INR: 1.09          PTT - ( 01 Apr 2019 15:55 )  PTT:28.4 SEC

## 2019-04-01 NOTE — H&P ADULT - HISTORY OF PRESENT ILLNESS
Patient is a 47 y/o M PMH sarcoidosis c/b aspergilosis (s/p voriconazole course 11/17), HTN, asthma, COPD, PFO, paroxysmal Afib (not on a/c 2/2 recurrent hemoptysis), CVA w/ L hemiplegia (9/17), persistent hemoptysis 2/2 DAVID bleed s/p   thoracotomy with DAVID lung lobectomy on 9/24/17 s/p IR embolization of Left   bronchial artery on 11/16/17 complicated by pneumothorax requiring chest   tube, s/p tracheostomy 11/17 for acute hypoxic respiratory failure (weaned   off ventilator 2/18 no on 2L NC at home) and s/p PEG 12/18, broncho-  pulmonary fistula, thyroid arterial pesuodaneyrusm, C diff p/w hemoptysis. Reports pink-tinged sputum, drainage from trach. No increase in cough, SOB, secretions, fever.  Recently admitted at a [facility] for same complaint for 1 week and   was discharged home 3/26 on Levofloxacin/medrol (completed). Sputum production has persisted so patient decided to come in.    Patient  is on 1 L NC at rest, 2 L NC with activity, occasionally goes off O2 altogether when he is at rest.    Patient was evaluated for lung transplant by [facility] a few years ago, was not candidate at that time due to suboptimal heart function.

## 2019-04-01 NOTE — H&P ADULT - NSHPPHYSICALEXAM_GEN_ALL_CORE
T(C): 36.4 (04-01-19 @ 22:37), Max: 37.1 (04-01-19 @ 20:32)  HR: 101 (04-01-19 @ 22:37) (57 - 105)  BP: 130/86 (04-01-19 @ 22:37) (124/87 - 161/97)  RR: 20 (04-01-19 @ 22:37) (18 - 20)  SpO2: 97% (04-01-19 @ 22:37) (97% - 100%)    Constitutional: NAD, well-developed, well-nourished  Ears, Nose, Mouth, and Throat: normal external ears and nose, normal hearing, moist oral mucosa  Eyes: normal conjunctiva, EOMI, PERRL  Neck: supple, no JVD, +trach, no secretions noted  Respiratory: scattered wheezing, coarse breath sounds, normal respiratory effort  Cardiovascular: RRR, no M/R/G, no edema, 2+ Peripheral Pulses  Gastrointestinal: soft, nontender, nondistended, +BS, no hernia  Skin: warm, dry, no rash  Neurologic: sensation grossly intact, CN grossly intact, non-focal exam  Musculoskeletal: no clubbing, no cyanosis, no joint swelling  Psychiatric: AOX3, appropriate mood, affect

## 2019-04-02 DIAGNOSIS — J44.9 CHRONIC OBSTRUCTIVE PULMONARY DISEASE, UNSPECIFIED: ICD-10-CM

## 2019-04-02 LAB
ALBUMIN SERPL ELPH-MCNC: 3.7 G/DL — SIGNIFICANT CHANGE UP (ref 3.3–5)
ALBUMIN SERPL ELPH-MCNC: 3.8 G/DL — SIGNIFICANT CHANGE UP (ref 3.3–5)
ALP SERPL-CCNC: 52 U/L — SIGNIFICANT CHANGE UP (ref 40–120)
ALP SERPL-CCNC: 53 U/L — SIGNIFICANT CHANGE UP (ref 40–120)
ALT FLD-CCNC: 11 U/L — SIGNIFICANT CHANGE UP (ref 4–41)
ALT FLD-CCNC: 11 U/L — SIGNIFICANT CHANGE UP (ref 4–41)
ANION GAP SERPL CALC-SCNC: 10 MMO/L — SIGNIFICANT CHANGE UP (ref 7–14)
ANION GAP SERPL CALC-SCNC: 12 MMO/L — SIGNIFICANT CHANGE UP (ref 7–14)
AST SERPL-CCNC: 12 U/L — SIGNIFICANT CHANGE UP (ref 4–40)
AST SERPL-CCNC: 13 U/L — SIGNIFICANT CHANGE UP (ref 4–40)
BILIRUB SERPL-MCNC: 0.4 MG/DL — SIGNIFICANT CHANGE UP (ref 0.2–1.2)
BILIRUB SERPL-MCNC: 0.4 MG/DL — SIGNIFICANT CHANGE UP (ref 0.2–1.2)
BUN SERPL-MCNC: 18 MG/DL — SIGNIFICANT CHANGE UP (ref 7–23)
BUN SERPL-MCNC: 23 MG/DL — SIGNIFICANT CHANGE UP (ref 7–23)
CALCIUM SERPL-MCNC: 9.1 MG/DL — SIGNIFICANT CHANGE UP (ref 8.4–10.5)
CALCIUM SERPL-MCNC: 9.3 MG/DL — SIGNIFICANT CHANGE UP (ref 8.4–10.5)
CHLORIDE SERPL-SCNC: 95 MMOL/L — LOW (ref 98–107)
CHLORIDE SERPL-SCNC: 98 MMOL/L — SIGNIFICANT CHANGE UP (ref 98–107)
CO2 SERPL-SCNC: 32 MMOL/L — HIGH (ref 22–31)
CO2 SERPL-SCNC: 33 MMOL/L — HIGH (ref 22–31)
CREAT SERPL-MCNC: 1.02 MG/DL — SIGNIFICANT CHANGE UP (ref 0.5–1.3)
CREAT SERPL-MCNC: 1.25 MG/DL — SIGNIFICANT CHANGE UP (ref 0.5–1.3)
GLUCOSE SERPL-MCNC: 104 MG/DL — HIGH (ref 70–99)
GLUCOSE SERPL-MCNC: 87 MG/DL — SIGNIFICANT CHANGE UP (ref 70–99)
GRAM STN SPT: SIGNIFICANT CHANGE UP
HCT VFR BLD CALC: 36.9 % — LOW (ref 39–50)
HCT VFR BLD CALC: 37.9 % — LOW (ref 39–50)
HGB BLD-MCNC: 11.1 G/DL — LOW (ref 13–17)
HGB BLD-MCNC: 11.2 G/DL — LOW (ref 13–17)
MAGNESIUM SERPL-MCNC: 1.8 MG/DL — SIGNIFICANT CHANGE UP (ref 1.6–2.6)
MAGNESIUM SERPL-MCNC: 2 MG/DL — SIGNIFICANT CHANGE UP (ref 1.6–2.6)
MCHC RBC-ENTMCNC: 26.8 PG — LOW (ref 27–34)
MCHC RBC-ENTMCNC: 27.1 PG — SIGNIFICANT CHANGE UP (ref 27–34)
MCHC RBC-ENTMCNC: 29.6 % — LOW (ref 32–36)
MCHC RBC-ENTMCNC: 30.1 % — LOW (ref 32–36)
MCV RBC AUTO: 89.1 FL — SIGNIFICANT CHANGE UP (ref 80–100)
MCV RBC AUTO: 91.5 FL — SIGNIFICANT CHANGE UP (ref 80–100)
NRBC # FLD: 0 K/UL — SIGNIFICANT CHANGE UP (ref 0–0)
NRBC # FLD: 0 K/UL — SIGNIFICANT CHANGE UP (ref 0–0)
PHOSPHATE SERPL-MCNC: 3.3 MG/DL — SIGNIFICANT CHANGE UP (ref 2.5–4.5)
PHOSPHATE SERPL-MCNC: 3.5 MG/DL — SIGNIFICANT CHANGE UP (ref 2.5–4.5)
PLATELET # BLD AUTO: 148 K/UL — LOW (ref 150–400)
PLATELET # BLD AUTO: 163 K/UL — SIGNIFICANT CHANGE UP (ref 150–400)
PMV BLD: 12 FL — SIGNIFICANT CHANGE UP (ref 7–13)
PMV BLD: 12.5 FL — SIGNIFICANT CHANGE UP (ref 7–13)
POTASSIUM SERPL-MCNC: 3.9 MMOL/L — SIGNIFICANT CHANGE UP (ref 3.5–5.3)
POTASSIUM SERPL-MCNC: 4.2 MMOL/L — SIGNIFICANT CHANGE UP (ref 3.5–5.3)
POTASSIUM SERPL-SCNC: 3.9 MMOL/L — SIGNIFICANT CHANGE UP (ref 3.5–5.3)
POTASSIUM SERPL-SCNC: 4.2 MMOL/L — SIGNIFICANT CHANGE UP (ref 3.5–5.3)
PROT SERPL-MCNC: 6.9 G/DL — SIGNIFICANT CHANGE UP (ref 6–8.3)
PROT SERPL-MCNC: 6.9 G/DL — SIGNIFICANT CHANGE UP (ref 6–8.3)
RBC # BLD: 4.14 M/UL — LOW (ref 4.2–5.8)
RBC # BLD: 4.14 M/UL — LOW (ref 4.2–5.8)
RBC # FLD: 15.4 % — HIGH (ref 10.3–14.5)
RBC # FLD: 15.6 % — HIGH (ref 10.3–14.5)
SODIUM SERPL-SCNC: 139 MMOL/L — SIGNIFICANT CHANGE UP (ref 135–145)
SODIUM SERPL-SCNC: 141 MMOL/L — SIGNIFICANT CHANGE UP (ref 135–145)
SPECIMEN SOURCE: SIGNIFICANT CHANGE UP
WBC # BLD: 11.86 K/UL — HIGH (ref 3.8–10.5)
WBC # BLD: 14.46 K/UL — HIGH (ref 3.8–10.5)
WBC # FLD AUTO: 11.86 K/UL — HIGH (ref 3.8–10.5)
WBC # FLD AUTO: 14.46 K/UL — HIGH (ref 3.8–10.5)

## 2019-04-02 PROCEDURE — 99233 SBSQ HOSP IP/OBS HIGH 50: CPT | Mod: GC

## 2019-04-02 PROCEDURE — 93010 ELECTROCARDIOGRAM REPORT: CPT

## 2019-04-02 RX ORDER — IPRATROPIUM/ALBUTEROL SULFATE 18-103MCG
3 AEROSOL WITH ADAPTER (GRAM) INHALATION EVERY 6 HOURS
Qty: 0 | Refills: 0 | Status: DISCONTINUED | OUTPATIENT
Start: 2019-04-02 | End: 2019-04-09

## 2019-04-02 RX ORDER — INFLUENZA VIRUS VACCINE 15; 15; 15; 15 UG/.5ML; UG/.5ML; UG/.5ML; UG/.5ML
0.5 SUSPENSION INTRAMUSCULAR ONCE
Qty: 0 | Refills: 0 | Status: COMPLETED | OUTPATIENT
Start: 2019-04-02 | End: 2019-04-02

## 2019-04-02 RX ORDER — ACETAMINOPHEN 500 MG
1000 TABLET ORAL ONCE
Qty: 0 | Refills: 0 | Status: COMPLETED | OUTPATIENT
Start: 2019-04-02 | End: 2019-04-02

## 2019-04-02 RX ORDER — PIPERACILLIN AND TAZOBACTAM 4; .5 G/20ML; G/20ML
3.38 INJECTION, POWDER, LYOPHILIZED, FOR SOLUTION INTRAVENOUS EVERY 8 HOURS
Qty: 0 | Refills: 0 | Status: DISCONTINUED | OUTPATIENT
Start: 2019-04-02 | End: 2019-04-03

## 2019-04-02 RX ORDER — SODIUM CHLORIDE 9 MG/ML
3 INJECTION INTRAMUSCULAR; INTRAVENOUS; SUBCUTANEOUS EVERY 6 HOURS
Qty: 0 | Refills: 0 | Status: DISCONTINUED | OUTPATIENT
Start: 2019-04-02 | End: 2019-04-09

## 2019-04-02 RX ORDER — PIPERACILLIN AND TAZOBACTAM 4; .5 G/20ML; G/20ML
3.38 INJECTION, POWDER, LYOPHILIZED, FOR SOLUTION INTRAVENOUS ONCE
Qty: 0 | Refills: 0 | Status: COMPLETED | OUTPATIENT
Start: 2019-04-02 | End: 2019-04-02

## 2019-04-02 RX ORDER — VANCOMYCIN HCL 1 G
VIAL (EA) INTRAVENOUS
Qty: 0 | Refills: 0 | Status: DISCONTINUED | OUTPATIENT
Start: 2019-04-02 | End: 2019-04-02

## 2019-04-02 RX ORDER — VANCOMYCIN HCL 1 G
1000 VIAL (EA) INTRAVENOUS EVERY 12 HOURS
Qty: 0 | Refills: 0 | Status: DISCONTINUED | OUTPATIENT
Start: 2019-04-02 | End: 2019-04-09

## 2019-04-02 RX ADMIN — Medication 25 MILLIGRAM(S): at 06:45

## 2019-04-02 RX ADMIN — Medication 3 MILLILITER(S): at 21:27

## 2019-04-02 RX ADMIN — SODIUM CHLORIDE 3 MILLILITER(S): 9 INJECTION INTRAMUSCULAR; INTRAVENOUS; SUBCUTANEOUS at 15:30

## 2019-04-02 RX ADMIN — LOSARTAN POTASSIUM 25 MILLIGRAM(S): 100 TABLET, FILM COATED ORAL at 06:45

## 2019-04-02 RX ADMIN — Medication 250 MILLIGRAM(S): at 21:45

## 2019-04-02 RX ADMIN — Medication 81 MILLIGRAM(S): at 11:28

## 2019-04-02 RX ADMIN — PIPERACILLIN AND TAZOBACTAM 25 GRAM(S): 4; .5 INJECTION, POWDER, LYOPHILIZED, FOR SOLUTION INTRAVENOUS at 15:54

## 2019-04-02 RX ADMIN — Medication 3 MILLILITER(S): at 15:30

## 2019-04-02 RX ADMIN — Medication 5 MILLIGRAM(S): at 06:45

## 2019-04-02 RX ADMIN — Medication 3 MILLILITER(S): at 04:02

## 2019-04-02 RX ADMIN — Medication 400 MILLIGRAM(S): at 23:00

## 2019-04-02 RX ADMIN — PIPERACILLIN AND TAZOBACTAM 25 GRAM(S): 4; .5 INJECTION, POWDER, LYOPHILIZED, FOR SOLUTION INTRAVENOUS at 23:29

## 2019-04-02 RX ADMIN — PIPERACILLIN AND TAZOBACTAM 200 GRAM(S): 4; .5 INJECTION, POWDER, LYOPHILIZED, FOR SOLUTION INTRAVENOUS at 14:53

## 2019-04-02 NOTE — PROGRESS NOTE ADULT - SUBJECTIVE AND OBJECTIVE BOX
CHIEF COMPLAINT:    Interval Events:    REVIEW OF SYSTEMS:  Constitutional:   Eyes:  ENT:  CV:  Resp:  GI:  :  MSK:  Integumentary:  Neurological:  Psychiatric:  Endocrine:  Hematologic/Lymphatic:  Allergic/Immunologic:  [ ] All other systems negative  [ ] Unable to assess ROS because ________    OBJECTIVE:  ICU Vital Signs Last 24 Hrs  T(C): 36.8 (02 Apr 2019 06:44), Max: 37.1 (01 Apr 2019 20:32)  T(F): 98.3 (02 Apr 2019 06:44), Max: 98.7 (01 Apr 2019 20:32)  HR: 104 (02 Apr 2019 06:44) (57 - 111)  BP: 123/84 (02 Apr 2019 06:44) (123/84 - 161/97)  BP(mean): --  ABP: --  ABP(mean): --  RR: 20 (02 Apr 2019 06:44) (18 - 20)  SpO2: 97% (02 Apr 2019 06:44) (96% - 100%)        CAPILLARY BLOOD GLUCOSE          PHYSICAL EXAM:  General:   HEENT:   Lymph Nodes:  Neck:   Respiratory:   Cardiovascular:   Abdomen:   Extremities:   Skin:   Neurological:  Psychiatry:    HOSPITAL MEDICATIONS:  MEDICATIONS  (STANDING):  ALBUTerol/ipratropium for Nebulization 3 milliLiter(s) Nebulizer every 6 hours  aspirin enteric coated 81 milliGRAM(s) Oral daily  influenza   Vaccine 0.5 milliLiter(s) IntraMuscular once  losartan 25 milliGRAM(s) Oral daily  metoprolol succinate ER 25 milliGRAM(s) Oral daily  predniSONE   Tablet 5 milliGRAM(s) Oral daily  sodium chloride 3%  Inhalation 3 milliLiter(s) Inhalation every 6 hours  tiotropium 18 MICROgram(s) Capsule 1 Capsule(s) Inhalation daily    MEDICATIONS  (PRN):      LABS:                        11.9   11.19 )-----------( 173      ( 01 Apr 2019 15:55 )             40.9     04-01    144  |  100  |  17  ----------------------------<  97  4.5   |  34<H>  |  1.11    Ca    9.6      01 Apr 2019 15:55    TPro  6.9  /  Alb  3.7  /  TBili  0.2  /  DBili  x   /  AST  19  /  ALT  12  /  AlkPhos  53  04-01    PT/INR - ( 01 Apr 2019 15:55 )   PT: 12.1 SEC;   INR: 1.09          PTT - ( 01 Apr 2019 15:55 )  PTT:28.4 SEC          MICROBIOLOGY:     RADIOLOGY:  [ ] Reviewed and interpreted by me    PULMONARY FUNCTION TESTS:    EKG: CHIEF COMPLAINT:Patient is a 48y old  Male who presents with a chief complaint of hemoptysis (02 Apr 2019 11:05)      Interval Events: none overnight     REVIEW OF SYSTEMS:  Constitutional: No fever or chills   CV: Denies   Resp: + hemoptysis   GI: Denies   Endocrine:  Hematologic/Lymphatic:  Allergic/Immunologic:  [ x] All other systems negative  [ ] Unable to assess ROS because ________    OBJECTIVE:  ICU Vital Signs Last 24 Hrs  T(C): 36.8 (02 Apr 2019 06:44), Max: 37.1 (01 Apr 2019 20:32)  T(F): 98.3 (02 Apr 2019 06:44), Max: 98.7 (01 Apr 2019 20:32)  HR: 104 (02 Apr 2019 06:44) (57 - 111)  BP: 123/84 (02 Apr 2019 06:44) (123/84 - 161/97)  BP(mean): --  ABP: --  ABP(mean): --  RR: 20 (02 Apr 2019 06:44) (18 - 20)  SpO2: 97% (02 Apr 2019 06:44) (96% - 100%)        CAPILLARY BLOOD GLUCOSE          PHYSICAL EXAM:  General:   HEENT:   Lymph Nodes:  Neck:   Respiratory:   Cardiovascular:   Abdomen:   Extremities:   Skin:   Neurological:  Psychiatry:    HOSPITAL MEDICATIONS:  MEDICATIONS  (STANDING):  ALBUTerol/ipratropium for Nebulization 3 milliLiter(s) Nebulizer every 6 hours  aspirin enteric coated 81 milliGRAM(s) Oral daily  influenza   Vaccine 0.5 milliLiter(s) IntraMuscular once  losartan 25 milliGRAM(s) Oral daily  metoprolol succinate ER 25 milliGRAM(s) Oral daily  predniSONE   Tablet 5 milliGRAM(s) Oral daily  sodium chloride 3%  Inhalation 3 milliLiter(s) Inhalation every 6 hours  tiotropium 18 MICROgram(s) Capsule 1 Capsule(s) Inhalation daily    MEDICATIONS  (PRN):      LABS:                        11.9   11.19 )-----------( 173      ( 01 Apr 2019 15:55 )             40.9     04-01    144  |  100  |  17  ----------------------------<  97  4.5   |  34<H>  |  1.11    Ca    9.6      01 Apr 2019 15:55    TPro  6.9  /  Alb  3.7  /  TBili  0.2  /  DBili  x   /  AST  19  /  ALT  12  /  AlkPhos  53  04-01    PT/INR - ( 01 Apr 2019 15:55 )   PT: 12.1 SEC;   INR: 1.09          PTT - ( 01 Apr 2019 15:55 )  PTT:28.4 SEC          MICROBIOLOGY:     RADIOLOGY:  [ ] Reviewed and interpreted by me    PULMONARY FUNCTION TESTS:    EKG:

## 2019-04-02 NOTE — PROGRESS NOTE ADULT - PROBLEM SELECTOR PLAN 1
Plan: hold a/c, f/u w/ pulm in AM, eval for decannulation Plan: hold a/c,  - Sputum is blood tinged and purulent sent for cx   - Start antibiotics vanco/zosyn   - Consider decannulation when not on abx/infection clears

## 2019-04-02 NOTE — PROGRESS NOTE ADULT - PROBLEM SELECTOR PLAN 2
not on a/c 2/2 recurrent hemoptysis, rate control, c/w metoprolol. - not on a/c 2/2 recurrent hemoptysis,   - rate control, c/w metoprolol.

## 2019-04-02 NOTE — PROVIDER CONTACT NOTE (OTHER) - ACTION/TREATMENT ORDERED:
blood cultures to be ordered.  IV tylenol to be ordered.  continue to monitor. blood cultures, urinalysis to be ordered.  IV tylenol to be ordered.  continue to monitor.

## 2019-04-03 ENCOUNTER — TRANSCRIPTION ENCOUNTER (OUTPATIENT)
Age: 49
End: 2019-04-03

## 2019-04-03 DIAGNOSIS — H35.719 CENTRAL SEROUS CHORIORETINOPATHY, UNSPECIFIED EYE: ICD-10-CM

## 2019-04-03 DIAGNOSIS — Z29.9 ENCOUNTER FOR PROPHYLACTIC MEASURES, UNSPECIFIED: ICD-10-CM

## 2019-04-03 DIAGNOSIS — R50.9 FEVER, UNSPECIFIED: ICD-10-CM

## 2019-04-03 LAB
APPEARANCE UR: CLEAR — SIGNIFICANT CHANGE UP
BACTERIA # UR AUTO: NEGATIVE — SIGNIFICANT CHANGE UP
BILIRUB UR-MCNC: NEGATIVE — SIGNIFICANT CHANGE UP
BLOOD UR QL VISUAL: HIGH
COLOR SPEC: SIGNIFICANT CHANGE UP
GLUCOSE UR-MCNC: NEGATIVE — SIGNIFICANT CHANGE UP
HYALINE CASTS # UR AUTO: NEGATIVE — SIGNIFICANT CHANGE UP
KETONES UR-MCNC: NEGATIVE — SIGNIFICANT CHANGE UP
LEUKOCYTE ESTERASE UR-ACNC: NEGATIVE — SIGNIFICANT CHANGE UP
NITRITE UR-MCNC: NEGATIVE — SIGNIFICANT CHANGE UP
PH UR: 6 — SIGNIFICANT CHANGE UP (ref 5–8)
PROT UR-MCNC: 20 — SIGNIFICANT CHANGE UP
RBC CASTS # UR COMP ASSIST: >50 — HIGH (ref 0–?)
SP GR SPEC: 1.03 — SIGNIFICANT CHANGE UP (ref 1–1.04)
SPECIMEN SOURCE: SIGNIFICANT CHANGE UP
SPECIMEN SOURCE: SIGNIFICANT CHANGE UP
SQUAMOUS # UR AUTO: SIGNIFICANT CHANGE UP
UROBILINOGEN FLD QL: NORMAL — SIGNIFICANT CHANGE UP
WBC UR QL: SIGNIFICANT CHANGE UP (ref 0–?)

## 2019-04-03 PROCEDURE — 71250 CT THORAX DX C-: CPT | Mod: 26

## 2019-04-03 PROCEDURE — 99233 SBSQ HOSP IP/OBS HIGH 50: CPT | Mod: GC

## 2019-04-03 RX ORDER — MEROPENEM 1 G/30ML
1000 INJECTION INTRAVENOUS EVERY 8 HOURS
Qty: 0 | Refills: 0 | Status: DISCONTINUED | OUTPATIENT
Start: 2019-04-03 | End: 2019-04-05

## 2019-04-03 RX ORDER — SPIRONOLACTONE 25 MG/1
25 TABLET, FILM COATED ORAL DAILY
Qty: 0 | Refills: 0 | Status: DISCONTINUED | OUTPATIENT
Start: 2019-04-03 | End: 2019-04-06

## 2019-04-03 RX ADMIN — Medication 3 MILLILITER(S): at 16:29

## 2019-04-03 RX ADMIN — Medication 81 MILLIGRAM(S): at 11:51

## 2019-04-03 RX ADMIN — SODIUM CHLORIDE 3 MILLILITER(S): 9 INJECTION INTRAMUSCULAR; INTRAVENOUS; SUBCUTANEOUS at 16:29

## 2019-04-03 RX ADMIN — Medication 5 MILLIGRAM(S): at 06:37

## 2019-04-03 RX ADMIN — SPIRONOLACTONE 25 MILLIGRAM(S): 25 TABLET, FILM COATED ORAL at 11:51

## 2019-04-03 RX ADMIN — LOSARTAN POTASSIUM 25 MILLIGRAM(S): 100 TABLET, FILM COATED ORAL at 06:37

## 2019-04-03 RX ADMIN — MEROPENEM 100 MILLIGRAM(S): 1 INJECTION INTRAVENOUS at 21:00

## 2019-04-03 RX ADMIN — Medication 250 MILLIGRAM(S): at 10:48

## 2019-04-03 RX ADMIN — Medication 3 MILLILITER(S): at 03:59

## 2019-04-03 RX ADMIN — PIPERACILLIN AND TAZOBACTAM 25 GRAM(S): 4; .5 INJECTION, POWDER, LYOPHILIZED, FOR SOLUTION INTRAVENOUS at 06:37

## 2019-04-03 RX ADMIN — Medication 3 MILLILITER(S): at 10:04

## 2019-04-03 RX ADMIN — Medication 25 MILLIGRAM(S): at 06:37

## 2019-04-03 RX ADMIN — MEROPENEM 100 MILLIGRAM(S): 1 INJECTION INTRAVENOUS at 14:51

## 2019-04-03 RX ADMIN — Medication 3 MILLILITER(S): at 22:47

## 2019-04-03 RX ADMIN — Medication 250 MILLIGRAM(S): at 22:00

## 2019-04-03 RX ADMIN — SODIUM CHLORIDE 3 MILLILITER(S): 9 INJECTION INTRAMUSCULAR; INTRAVENOUS; SUBCUTANEOUS at 10:05

## 2019-04-03 NOTE — PROGRESS NOTE ADULT - PROBLEM SELECTOR PLAN 1
- with fever to 102.6  - sputum culture with pseudomonas and staph, pending final sensitivity  - blood cultures pending  - cont vanco, change to cici  - check CT chest  - monitor fevers and WBC

## 2019-04-03 NOTE — PROGRESS NOTE ADULT - SUBJECTIVE AND OBJECTIVE BOX
CHIEF COMPLAINT: Patient is a 48y old  Male who presents with a chief complaint of hemoptysis (2019 11:05)    Interval Events:      REVIEW OF SYSTEMS:  Constitutional:   Eyes:  ENT:  CV:  Resp:  GI:  :  MSK:  Integumentary:  Neurological:  Psychiatric:  Endocrine:  Hematologic/Lymphatic:  Allergic/Immunologic:  [ ] All other systems negative  [ ] Unable to assess ROS because ________      OBJECTIVE:  ICU Vital Signs Last 24 Hrs  T(C): 37.2 (2019 06:35), Max: 39.2 (2019 21:43)  T(F): 98.9 (2019 06:35), Max: 102.6 (2019 21:43)  HR: 111 (2019 06:35) (100 - 122)  BP: 130/88 (2019 06:35) (125/83 - 138/72)  BP(mean): --  ABP: --  ABP(mean): --  RR: 20 (2019 06:35) (20 - 21)  SpO2: 98% (2019 06:35) (93% - 98%)    HOSPITAL MEDICATIONS:  MEDICATIONS  (STANDING):  ALBUTerol/ipratropium for Nebulization 3 milliLiter(s) Nebulizer every 6 hours  aspirin enteric coated 81 milliGRAM(s) Oral daily  influenza   Vaccine 0.5 milliLiter(s) IntraMuscular once  losartan 25 milliGRAM(s) Oral daily  metoprolol succinate ER 25 milliGRAM(s) Oral daily  piperacillin/tazobactam IVPB. 3.375 Gram(s) IV Intermittent every 8 hours  predniSONE   Tablet 5 milliGRAM(s) Oral daily  sodium chloride 3%  Inhalation 3 milliLiter(s) Inhalation every 6 hours  vancomycin  IVPB 1000 milliGRAM(s) IV Intermittent every 12 hours    MEDICATIONS  (PRN):      LABS:                        11.2   14.46 )-----------( 148      ( 2019 22:30 )             37.9     04-02    139  |  95<L>  |  23  ----------------------------<  104<H>  3.9   |  32<H>  |  1.25    Ca    9.1      2019 22:30  Phos  3.5     04-02  Mg     1.8     -    TPro  6.9  /  Alb  3.7  /  TBili  0.4  /  DBili  x   /  AST  13  /  ALT  11  /  AlkPhos  52  -    PT/INR - ( 2019 15:55 )   PT: 12.1 SEC;   INR: 1.09          PTT - ( 2019 15:55 )  PTT:28.4 SEC  Urinalysis Basic - ( 2019 23:20 )    Color: LIGHT YELLOW / Appearance: CLEAR / S.026 / pH: 6.0  Gluc: NEGATIVE / Ketone: NEGATIVE  / Bili: NEGATIVE / Urobili: NORMAL   Blood: MODERATE / Protein: 20 / Nitrite: NEGATIVE   Leuk Esterase: NEGATIVE / RBC: >50 / WBC 3-5   Sq Epi: OCC / Non Sq Epi: x / Bacteria: NEGATIVE            MICROBIOLOGY:     RADIOLOGY:  [ ] Reviewed and interpreted by me    PULMONARY FUNCTION TESTS:    EKG: CHIEF COMPLAINT: Patient is a 48y old  Male who presents with a chief complaint of hemoptysis (02 Apr 2019 11:05)    Interval Events: none overnight      REVIEW OF SYSTEMS:  Constitutional: no complaints  CV: denies  Resp: c/o cough and sputum  GI: denies  [x] All other systems negative  [ ] Unable to assess ROS because ________      OBJECTIVE:  ICU Vital Signs Last 24 Hrs  T(C): 37.2 (03 Apr 2019 06:35), Max: 39.2 (02 Apr 2019 21:43)  T(F): 98.9 (03 Apr 2019 06:35), Max: 102.6 (02 Apr 2019 21:43)  HR: 111 (03 Apr 2019 06:35) (100 - 122)  BP: 130/88 (03 Apr 2019 06:35) (125/83 - 138/72)  BP(mean): --  ABP: --  ABP(mean): --  RR: 20 (03 Apr 2019 06:35) (20 - 21)  SpO2: 98% (03 Apr 2019 06:35) (93% - 98%)    HOSPITAL MEDICATIONS:  MEDICATIONS  (STANDING):  ALBUTerol/ipratropium for Nebulization 3 milliLiter(s) Nebulizer every 6 hours  aspirin enteric coated 81 milliGRAM(s) Oral daily  influenza   Vaccine 0.5 milliLiter(s) IntraMuscular once  losartan 25 milliGRAM(s) Oral daily  metoprolol succinate ER 25 milliGRAM(s) Oral daily  piperacillin/tazobactam IVPB. 3.375 Gram(s) IV Intermittent every 8 hours  predniSONE   Tablet 5 milliGRAM(s) Oral daily  sodium chloride 3%  Inhalation 3 milliLiter(s) Inhalation every 6 hours  vancomycin  IVPB 1000 milliGRAM(s) IV Intermittent every 12 hours          MICROBIOLOGY:     Culture - Respiratory with Gram Stain (04.02.19 @ 11:39)    Culture - Respiratory:   STAU^Staphylococcus aureus  QUANTITY OF GROWTH: MODERATE  PSA^Pseudomonas aeruginosa  QUANTITY OF GROWTH: FEW    Gram Stain Sputum:   GPR^Gram Positive Rods  QUANTITY OF BACTERIA SEEN: FEW (2+)  GPC^Gram pos. cocci  QUANTITY OF BACTERIA SEEN: FEW (2+)    Specimen Source: SPUTUM

## 2019-04-03 NOTE — PROGRESS NOTE ADULT - PROBLEM SELECTOR PLAN 4
- nebs  - spiriva  - supplemental o2 as needed  - trach is capped - would not decannulate at this time as pt is with active infection

## 2019-04-03 NOTE — DISCHARGE NOTE PROVIDER - NSFOLLOWUPCLINICS_GEN_ALL_ED_FT
[facility] Pulmonolgy and Sleep Medicine  Pulmonology  [address] [place] [address]  Phone: [phone]  Fax:   Follow Up Time:

## 2019-04-03 NOTE — DISCHARGE NOTE PROVIDER - NSDCCPCAREPLAN_GEN_ALL_CORE_FT
PRINCIPAL DISCHARGE DIAGNOSIS  Diagnosis: Acute on chronic respiratory failure  Assessment and Plan of Treatment: Completed course of antibiotics for pseudomonas and MSSA pneumonia.  Supplemental oxygen as needed.  Please follow up with pulmonary-----------      SECONDARY DISCHARGE DIAGNOSES  Diagnosis: Hemoptysis  Assessment and Plan of Treatment: Now resolved.    Diagnosis: Paroxysmal atrial fibrillation  Assessment and Plan of Treatment:     Diagnosis: Sarcoidosis  Assessment and Plan of Treatment: Continue prednisone.    Diagnosis: Central serous retinopathy  Assessment and Plan of Treatment: Continue aldactone. Please follow up with your ophthalmologist.    Diagnosis: Essential hypertension  Assessment and Plan of Treatment: Continue medications as directed. Blood pressure is stable. PRINCIPAL DISCHARGE DIAGNOSIS  Diagnosis: Acute on chronic respiratory failure  Assessment and Plan of Treatment: Completed course of antibiotics for pseudomonas and MSSA pneumonia----------------------  Supplemental oxygen as needed.  Please follow up with pulmonary-----------      SECONDARY DISCHARGE DIAGNOSES  Diagnosis: Hemoptysis  Assessment and Plan of Treatment: Now resolved.    Diagnosis: Paroxysmal atrial fibrillation  Assessment and Plan of Treatment: Continue eliquis.  Rate controlled.    Diagnosis: Sarcoidosis  Assessment and Plan of Treatment: Continue prednisone.    Diagnosis: Central serous retinopathy  Assessment and Plan of Treatment: Continue aldactone. Please follow up with your ophthalmologist.    Diagnosis: Essential hypertension  Assessment and Plan of Treatment: Continue medications as directed. Blood pressure is stable. PRINCIPAL DISCHARGE DIAGNOSIS  Diagnosis: Acute on chronic respiratory failure  Assessment and Plan of Treatment: Completed course of antibiotics for pseudomonas and MSSA pneumonia----------------------  Supplemental oxygen as needed via nasal cannula.  Decannulated on 4/8.  Please follow up with pulmonary-----------      SECONDARY DISCHARGE DIAGNOSES  Diagnosis: Hemoptysis  Assessment and Plan of Treatment: Now resolved.    Diagnosis: Paroxysmal atrial fibrillation  Assessment and Plan of Treatment: Continue eliquis.  Rate controlled.    Diagnosis: Sarcoidosis  Assessment and Plan of Treatment: Continue prednisone.    Diagnosis: Central serous retinopathy  Assessment and Plan of Treatment: Continue aldactone. Please follow up with your ophthalmologist.    Diagnosis: Essential hypertension  Assessment and Plan of Treatment: Continue medications as directed. Blood pressure is stable. PRINCIPAL DISCHARGE DIAGNOSIS  Diagnosis: Acute on chronic respiratory failure  Assessment and Plan of Treatment: Continue course of antibiotics for pseudomonas and MSSA pneumonia - bactrim and cipro, until completed.  Supplemental oxygen as needed via nasal cannula.  Decannulated on 4/8 - keep stoma covered, change dressing daily or as needed.  Continue inhalers and medications.  Suction as needed.  Please follow up with pulmonary - the office will call you with your appointment time - if you do NOT hear from them by Wednesday evening, please call the office at [phone].      SECONDARY DISCHARGE DIAGNOSES  Diagnosis: Hemoptysis  Assessment and Plan of Treatment: Now resolved.    Diagnosis: Paroxysmal atrial fibrillation  Assessment and Plan of Treatment: Continue eliquis.  Rate controlled.    Diagnosis: Sarcoidosis  Assessment and Plan of Treatment: Continue prednisone.    Diagnosis: Central serous retinopathy  Assessment and Plan of Treatment: Continue aldactone. Please follow up with your ophthalmologist.    Diagnosis: Essential hypertension  Assessment and Plan of Treatment: Continue medications as directed. Blood pressure is stable.

## 2019-04-03 NOTE — DISCHARGE NOTE PROVIDER - CARE PROVIDER_API CALL
Chuck Hathaway)  Critical Care Medicine; Internal Medicine; Pulmonary Disease  [address] [id number]  Phone: [phone]  Fax: [phone]  Follow Up Time:

## 2019-04-03 NOTE — PROVIDER CONTACT NOTE (OTHER) - SITUATION
hr of 111, parameters state to call provider if HR over 100. pt runs tachy at baseline. pt given metoprolol and losartan after vital signs taken.

## 2019-04-03 NOTE — DISCHARGE NOTE PROVIDER - HOSPITAL COURSE
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    Admitted to [facility].    Abx started.    Sputum cultures with pseudomonas and MSSA------------------    Blood cultures negative.    CT chest unchanged.    Completed IV abx-------------------    PT recs outpt PT.                    dispo: 48 M PMH sarcoidosis c/b aspergilosis (s/p voriconazole course 11/17), HTN, asthma, COPD, PFO, paroxysmal Afib (not on a/c 2/2 recurrent hemoptysis), CVA w/ L hemiplegia (9/17), persistent hemoptysis 2/2 DAVID bleed s/p thoracotomy with DAVID lung lobectomy on 9/24/17 s/p IR embolization of Left bronchial artery on 11/16/17 complicated by pneumothorax requiring chest tube, s/p tracheostomy 11/17 for acute hypoxic respiratory failure (weaned off ventilator 2/18 no on 2L NC at home) and s/p PEG 12/18, broncho-pulmonary fistula, thyroid arterial pesuodaneyrusm, C diff p/w hemoptysis.    Admitted to [facility].    Abx started.    Sputum cultures with pseudomonas and MRSA.    Blood cultures negative.    CT chest unchanged.    Completed IV abx-------------------    PT recs outpt PT.    Decannulated on 4/8 - tolerating 2L NC.            dispo: 48 M PMH sarcoidosis c/b aspergilosis (s/p voriconazole course 11/17), HTN, asthma, COPD, PFO, paroxysmal Afib (not on a/c 2/2 recurrent hemoptysis), CVA w/ L hemiplegia (9/17), persistent hemoptysis 2/2 DAVID bleed s/p thoracotomy with DAVID lung lobectomy on 9/24/17 s/p IR embolization of Left bronchial artery on 11/16/17 complicated by pneumothorax requiring chest tube, s/p tracheostomy 11/17 for acute hypoxic respiratory failure (weaned off ventilator 2/18 no on 2L NC at home) and s/p PEG 12/18, broncho-pulmonary fistula, thyroid arterial pesuodaneyrusm, C diff p/w hemoptysis.    Admitted to [facility].    Abx started.    Sputum cultures with pseudomonas and MRSA.    Blood cultures negative.    CT chest unchanged.    Completed IV abx for 1 week, changed to PO for another week - 2 weeks total.    PT recs outpt PT.    Decannulated on 4/8 - tolerating 2L NC.            dispo: home with outpatient PT

## 2019-04-03 NOTE — PROGRESS NOTE ADULT - PROBLEM SELECTOR PLAN 5
- not on AC 2/2 recurrent hemoptysis  - slightly tachycardic - cont metoprolol at current dose, monitor for now as tachycardia may be related to current acute status/fever and increase if necessary

## 2019-04-04 LAB
-  AMIKACIN: SIGNIFICANT CHANGE UP
-  AZTREONAM: SIGNIFICANT CHANGE UP
-  CEFAZOLIN: SIGNIFICANT CHANGE UP
-  CEFEPIME: SIGNIFICANT CHANGE UP
-  CEFTAZIDIME: SIGNIFICANT CHANGE UP
-  CIPROFLOXACIN: SIGNIFICANT CHANGE UP
-  CIPROFLOXACIN: SIGNIFICANT CHANGE UP
-  CLINDAMYCIN: SIGNIFICANT CHANGE UP
-  ERYTHROMYCIN: SIGNIFICANT CHANGE UP
-  GENTAMICIN: SIGNIFICANT CHANGE UP
-  GENTAMICIN: SIGNIFICANT CHANGE UP
-  IMIPENEM: SIGNIFICANT CHANGE UP
-  LEVOFLOXACIN: SIGNIFICANT CHANGE UP
-  LEVOFLOXACIN: SIGNIFICANT CHANGE UP
-  LINEZOLID: SIGNIFICANT CHANGE UP
-  MEROPENEM: SIGNIFICANT CHANGE UP
-  MOXIFLOXACIN(AEROBIC): SIGNIFICANT CHANGE UP
-  OXACILLIN: SIGNIFICANT CHANGE UP
-  PENICILLIN: SIGNIFICANT CHANGE UP
-  PIPERACILLIN/TAZOBACTAM: SIGNIFICANT CHANGE UP
-  RIFAMPIN.: SIGNIFICANT CHANGE UP
-  TETRACYCLINE: SIGNIFICANT CHANGE UP
-  TOBRAMYCIN: SIGNIFICANT CHANGE UP
-  TRIMETHOPRIM/SULFAMETHOXAZOLE: SIGNIFICANT CHANGE UP
-  VANCOMYCIN: SIGNIFICANT CHANGE UP
ANION GAP SERPL CALC-SCNC: 10 MMO/L — SIGNIFICANT CHANGE UP (ref 7–14)
BACTERIA SPT RESP CULT: SIGNIFICANT CHANGE UP
BUN SERPL-MCNC: 17 MG/DL — SIGNIFICANT CHANGE UP (ref 7–23)
CALCIUM SERPL-MCNC: 9.2 MG/DL — SIGNIFICANT CHANGE UP (ref 8.4–10.5)
CHLORIDE SERPL-SCNC: 97 MMOL/L — LOW (ref 98–107)
CO2 SERPL-SCNC: 34 MMOL/L — HIGH (ref 22–31)
CREAT SERPL-MCNC: 1.09 MG/DL — SIGNIFICANT CHANGE UP (ref 0.5–1.3)
GLUCOSE SERPL-MCNC: 101 MG/DL — HIGH (ref 70–99)
GRAM STN SPT: SIGNIFICANT CHANGE UP
HCT VFR BLD CALC: 36.3 % — LOW (ref 39–50)
HGB BLD-MCNC: 10.6 G/DL — LOW (ref 13–17)
MCHC RBC-ENTMCNC: 26.7 PG — LOW (ref 27–34)
MCHC RBC-ENTMCNC: 29.2 % — LOW (ref 32–36)
MCV RBC AUTO: 91.4 FL — SIGNIFICANT CHANGE UP (ref 80–100)
METHOD TYPE: SIGNIFICANT CHANGE UP
METHOD TYPE: SIGNIFICANT CHANGE UP
NRBC # FLD: 0 K/UL — SIGNIFICANT CHANGE UP (ref 0–0)
ORGANISM # SPEC MICROSCOPIC CNT: SIGNIFICANT CHANGE UP
PLATELET # BLD AUTO: 132 K/UL — LOW (ref 150–400)
PMV BLD: 12.3 FL — SIGNIFICANT CHANGE UP (ref 7–13)
POTASSIUM SERPL-MCNC: 3.9 MMOL/L — SIGNIFICANT CHANGE UP (ref 3.5–5.3)
POTASSIUM SERPL-SCNC: 3.9 MMOL/L — SIGNIFICANT CHANGE UP (ref 3.5–5.3)
RBC # BLD: 3.97 M/UL — LOW (ref 4.2–5.8)
RBC # FLD: 15.4 % — HIGH (ref 10.3–14.5)
SODIUM SERPL-SCNC: 141 MMOL/L — SIGNIFICANT CHANGE UP (ref 135–145)
VANCOMYCIN TROUGH SERPL-MCNC: 12.9 UG/ML — SIGNIFICANT CHANGE UP (ref 10–20)
VANCOMYCIN TROUGH SERPL-MCNC: 14.6 UG/ML — SIGNIFICANT CHANGE UP (ref 10–20)
WBC # BLD: 10.63 K/UL — HIGH (ref 3.8–10.5)
WBC # FLD AUTO: 10.63 K/UL — HIGH (ref 3.8–10.5)

## 2019-04-04 PROCEDURE — 99233 SBSQ HOSP IP/OBS HIGH 50: CPT | Mod: GC

## 2019-04-04 RX ADMIN — MEROPENEM 100 MILLIGRAM(S): 1 INJECTION INTRAVENOUS at 07:06

## 2019-04-04 RX ADMIN — MEROPENEM 100 MILLIGRAM(S): 1 INJECTION INTRAVENOUS at 15:23

## 2019-04-04 RX ADMIN — MEROPENEM 100 MILLIGRAM(S): 1 INJECTION INTRAVENOUS at 21:30

## 2019-04-04 RX ADMIN — Medication 250 MILLIGRAM(S): at 21:55

## 2019-04-04 RX ADMIN — Medication 250 MILLIGRAM(S): at 11:00

## 2019-04-04 RX ADMIN — SODIUM CHLORIDE 3 MILLILITER(S): 9 INJECTION INTRAMUSCULAR; INTRAVENOUS; SUBCUTANEOUS at 09:06

## 2019-04-04 RX ADMIN — Medication 3 MILLILITER(S): at 04:07

## 2019-04-04 RX ADMIN — SODIUM CHLORIDE 3 MILLILITER(S): 9 INJECTION INTRAMUSCULAR; INTRAVENOUS; SUBCUTANEOUS at 16:07

## 2019-04-04 RX ADMIN — Medication 3 MILLILITER(S): at 22:15

## 2019-04-04 RX ADMIN — Medication 3 MILLILITER(S): at 09:06

## 2019-04-04 RX ADMIN — Medication 5 MILLIGRAM(S): at 07:05

## 2019-04-04 RX ADMIN — Medication 3 MILLILITER(S): at 16:07

## 2019-04-04 NOTE — PHYSICAL THERAPY INITIAL EVALUATION ADULT - PERTINENT HX OF CURRENT PROBLEM, REHAB EVAL
Patient is 48 year old male admitted with history of sarcoidoses, CVA with hemiplegia, presents with hemoptysis

## 2019-04-04 NOTE — PROGRESS NOTE ADULT - PROBLEM SELECTOR PLAN 1
- with fever to 102.6  - sputum culture with pseudomonas and staph, pending final sensitivity  - blood cultures negative to date  - cont vanco/cici at this time pending sensitivity   - CT chest without acute changes  - monitor fevers and WBC

## 2019-04-04 NOTE — PROGRESS NOTE ADULT - SUBJECTIVE AND OBJECTIVE BOX
CHIEF COMPLAINT: Patient is a 48y old  Male who presents with a chief complaint of hemoptysis (2019 14:02)    Interval Events:      REVIEW OF SYSTEMS:  Constitutional:   Eyes:  ENT:  CV:  Resp:  GI:  :  MSK:  Integumentary:  Neurological:  Psychiatric:  Endocrine:  Hematologic/Lymphatic:  Allergic/Immunologic:  [ ] All other systems negative  [ ] Unable to assess ROS because ________      OBJECTIVE:  ICU Vital Signs Last 24 Hrs  T(C): 37.4 (2019 07:03), Max: 37.4 (2019 21:00)  T(F): 99.3 (2019 07:03), Max: 99.3 (2019 21:00)  HR: 100 (2019 07:03) (100 - 114)  BP: 109/68 (2019 07:03) (109/68 - 123/73)  BP(mean): --  ABP: --  ABP(mean): --  RR: 20 (2019 07:03) (20 - 20)  SpO2: 95% (2019 07:03) (95% - 97%)    HOSPITAL MEDICATIONS:  MEDICATIONS  (STANDING):  ALBUTerol/ipratropium for Nebulization 3 milliLiter(s) Nebulizer every 6 hours  aspirin enteric coated 81 milliGRAM(s) Oral daily  influenza   Vaccine 0.5 milliLiter(s) IntraMuscular once  losartan 25 milliGRAM(s) Oral daily  meropenem  IVPB 1000 milliGRAM(s) IV Intermittent every 8 hours  metoprolol succinate ER 25 milliGRAM(s) Oral daily  predniSONE   Tablet 5 milliGRAM(s) Oral daily  sodium chloride 3%  Inhalation 3 milliLiter(s) Inhalation every 6 hours  spironolactone 25 milliGRAM(s) Oral daily  vancomycin  IVPB 1000 milliGRAM(s) IV Intermittent every 12 hours    MEDICATIONS  (PRN):      LABS:                        10.6   10.63 )-----------( 132      ( 2019 05:20 )             36.3     04-02    139  |  95<L>  |  23  ----------------------------<  104<H>  3.9   |  32<H>  |  1.25    Ca    9.1      2019 22:30  Phos  3.5     04-  Mg     1.8     -    TPro  6.9  /  Alb  3.7  /  TBili  0.4  /  DBili  x   /  AST  13  /  ALT  11  /  AlkPhos  52  -      Urinalysis Basic - ( 2019 23:20 )    Color: LIGHT YELLOW / Appearance: CLEAR / S.026 / pH: 6.0  Gluc: NEGATIVE / Ketone: NEGATIVE  / Bili: NEGATIVE / Urobili: NORMAL   Blood: MODERATE / Protein: 20 / Nitrite: NEGATIVE   Leuk Esterase: NEGATIVE / RBC: >50 / WBC 3-5   Sq Epi: OCC / Non Sq Epi: x / Bacteria: NEGATIVE            MICROBIOLOGY:     RADIOLOGY:  [ ] Reviewed and interpreted by me    PULMONARY FUNCTION TESTS:    EKG: CHIEF COMPLAINT: Patient is a 48y old  Male who presents with a chief complaint of hemoptysis (2019 14:02)    Interval Events: none overnight      REVIEW OF SYSTEMS:  Constitutional: feels better  CV: denies  Resp: less sputum than yesterday  GI: denies  [x] All other systems negative  [ ] Unable to assess ROS because ________      OBJECTIVE:  ICU Vital Signs Last 24 Hrs  T(C): 37.4 (2019 07:03), Max: 37.4 (2019 21:00)  T(F): 99.3 (2019 07:03), Max: 99.3 (2019 21:00)  HR: 100 (2019 07:03) (100 - 114)  BP: 109/68 (2019 07:03) (109/68 - 123/73)  BP(mean): --  ABP: --  ABP(mean): --  RR: 20 (2019 07:03) (20 - 20)  SpO2: 95% (2019 07:03) (95% - 97%)    HOSPITAL MEDICATIONS:  MEDICATIONS  (STANDING):  ALBUTerol/ipratropium for Nebulization 3 milliLiter(s) Nebulizer every 6 hours  aspirin enteric coated 81 milliGRAM(s) Oral daily  influenza   Vaccine 0.5 milliLiter(s) IntraMuscular once  losartan 25 milliGRAM(s) Oral daily  meropenem  IVPB 1000 milliGRAM(s) IV Intermittent every 8 hours  metoprolol succinate ER 25 milliGRAM(s) Oral daily  predniSONE   Tablet 5 milliGRAM(s) Oral daily  sodium chloride 3%  Inhalation 3 milliLiter(s) Inhalation every 6 hours  spironolactone 25 milliGRAM(s) Oral daily  vancomycin  IVPB 1000 milliGRAM(s) IV Intermittent every 12 hours        LABS:                        10.6   10.63 )-----------( 132      ( 2019 05:20 )             36.3     04-02    139  |  95<L>  |  23  ----------------------------<  104<H>  3.9   |  32<H>  |  1.25    Ca    9.1      2019 22:30  Phos  3.5     04-  Mg     1.8     04-02    TPro  6.9  /  Alb  3.7  /  TBili  0.4  /  DBili  x   /  AST  13  /  ALT  11  /  AlkPhos  52  04-      Urinalysis Basic - ( 2019 23:20 )    Color: LIGHT YELLOW / Appearance: CLEAR / S.026 / pH: 6.0  Gluc: NEGATIVE / Ketone: NEGATIVE  / Bili: NEGATIVE / Urobili: NORMAL   Blood: MODERATE / Protein: 20 / Nitrite: NEGATIVE   Leuk Esterase: NEGATIVE / RBC: >50 / WBC 3-5   Sq Epi: OCC / Non Sq Epi: x / Bacteria: NEGATIVE            MICROBIOLOGY:     RADIOLOGY:  [ ] Reviewed and interpreted by me    PULMONARY FUNCTION TESTS:    EKG: CHIEF COMPLAINT: Patient is a 48y old  Male who presents with a chief complaint of hemoptysis (03 Apr 2019 14:02)    Interval Events: none overnight      REVIEW OF SYSTEMS:  Constitutional: feels better  CV: denies  Resp: less sputum than yesterday  GI: denies  [x] All other systems negative  [ ] Unable to assess ROS because ________      OBJECTIVE:  ICU Vital Signs Last 24 Hrs  T(C): 37.4 (04 Apr 2019 07:03), Max: 37.4 (03 Apr 2019 21:00)  T(F): 99.3 (04 Apr 2019 07:03), Max: 99.3 (03 Apr 2019 21:00)  HR: 100 (04 Apr 2019 07:03) (100 - 114)  BP: 109/68 (04 Apr 2019 07:03) (109/68 - 123/73)  BP(mean): --  ABP: --  ABP(mean): --  RR: 20 (04 Apr 2019 07:03) (20 - 20)  SpO2: 95% (04 Apr 2019 07:03) (95% - 97%)    HOSPITAL MEDICATIONS:  MEDICATIONS  (STANDING):  ALBUTerol/ipratropium for Nebulization 3 milliLiter(s) Nebulizer every 6 hours  aspirin enteric coated 81 milliGRAM(s) Oral daily  influenza   Vaccine 0.5 milliLiter(s) IntraMuscular once  losartan 25 milliGRAM(s) Oral daily  meropenem  IVPB 1000 milliGRAM(s) IV Intermittent every 8 hours  metoprolol succinate ER 25 milliGRAM(s) Oral daily  predniSONE   Tablet 5 milliGRAM(s) Oral daily  sodium chloride 3%  Inhalation 3 milliLiter(s) Inhalation every 6 hours  spironolactone 25 milliGRAM(s) Oral daily  vancomycin  IVPB 1000 milliGRAM(s) IV Intermittent every 12 hours        LABS:                        10.6   10.63 )-----------( 132      ( 04 Apr 2019 05:20 )             36.3       Basic Metabolic Panel in AM (04.04.19 @ 05:20)    Sodium, Serum: 141 mmol/L    Potassium, Serum: 3.9 mmol/L    Chloride, Serum: 97 mmol/L    Carbon Dioxide, Serum: 34 mmol/L    Anion Gap, Serum: 10 mmo/L    Blood Urea Nitrogen, Serum: 17 mg/dL    Creatinine, Serum: 1.09 mg/dL    Glucose, Serum: 101 mg/dL    Calcium, Total Serum: 9.2 mg/dL    eGFR if Non : 80: The units for eGFR are ml/min/1.73m2 (normalized body  surface area). The eGFR is calculated from a serum  creatinine using the CKD-EPI equation. Other variables  required for calculation are race, age and sex. Among  patients with chronic kidney disease (CKD), the eGFR is  useful in determining the stage of disease according to  KDOQI CKD classification. All eGFR results are reported  numerically with the following interpretation.    GFR  (ml/min/1.73 m2)          W/KIDNEY DAMAGE    W/O KIDNEY DMG  ==========================================================  >= 90.......................Stage 1..............Normal  60-89.......................Stage 2...........Decreased GFR  30-59.......................Stage 3..............Stage 3  15-29.......................Stage 4..............Stage 4  < 15........................Stage 5..............Stage 5    Each stage of CKD assumes that the associated GFR level  has been in effect for at least 3 months. Determination of  stages one and two (with eGFR > 59ml/min/m2) requires  estimation of kidney damage for at least 3 months as  defined by structural or functional abnormalities.    Limitations: All estimates of GFR will be less accurate  for patients at extremes of muscle mass (including but  not limited to frail elderly, critically ill, or cancer  patients), those with unusual diets, and those with  conditions associated with reduced secretion or  extrarenal elimination of creatinine. The eGFR equation  is not recommended for use in patients with unstable  creatinine levels. mL/min    eGFR if : 93 mL/min        MICROBIOLOGY:     Culture - Blood (04.02.19 @ 23:01)    Culture - Blood:   NO ORGANISMS ISOLATED  NO ORGANISMS ISOLATED AT 24 HOURS    Specimen Source: BLOOD VENOUS    Culture - Blood (04.02.19 @ 23:01)    Culture - Blood:   NO ORGANISMS ISOLATED  NO ORGANISMS ISOLATED AT 24 HOURS    Specimen Source: BLOOD PERIPHERAL    Culture - Respiratory with Gram Stain (04.02.19 @ 11:39)    Gram Stain Sputum:   GPR^Gram Positive Rods  QUANTITY OF BACTERIA SEEN: FEW (2+)  GPC^Gram pos. cocci  QUANTITY OF BACTERIA SEEN: FEW (2+)    Culture - Respiratory:   STAU^Staphylococcus aureus  QUANTITY OF GROWTH: MODERATE  PSA^Pseudomonas aeruginosa  QUANTITY OF GROWTH: FEW    Culture - Respiratory:   Normal Respiratory Katherine Absent  STAU^Staphylococcus aureus  QUANTITY OF GROWTH: MODERATE  PSA^Pseudomonas aeruginosa  QUANTITY OF GROWTH: FEW    Specimen Source: SPUTUM

## 2019-04-05 LAB
ANION GAP SERPL CALC-SCNC: 10 MMO/L — SIGNIFICANT CHANGE UP (ref 7–14)
BUN SERPL-MCNC: 13 MG/DL — SIGNIFICANT CHANGE UP (ref 7–23)
CALCIUM SERPL-MCNC: 9.1 MG/DL — SIGNIFICANT CHANGE UP (ref 8.4–10.5)
CHLORIDE SERPL-SCNC: 98 MMOL/L — SIGNIFICANT CHANGE UP (ref 98–107)
CO2 SERPL-SCNC: 34 MMOL/L — HIGH (ref 22–31)
CREAT SERPL-MCNC: 1 MG/DL — SIGNIFICANT CHANGE UP (ref 0.5–1.3)
GLUCOSE SERPL-MCNC: 95 MG/DL — SIGNIFICANT CHANGE UP (ref 70–99)
HCT VFR BLD CALC: 34 % — LOW (ref 39–50)
HGB BLD-MCNC: 10 G/DL — LOW (ref 13–17)
MCHC RBC-ENTMCNC: 26.9 PG — LOW (ref 27–34)
MCHC RBC-ENTMCNC: 29.4 % — LOW (ref 32–36)
MCV RBC AUTO: 91.4 FL — SIGNIFICANT CHANGE UP (ref 80–100)
NRBC # FLD: 0 K/UL — SIGNIFICANT CHANGE UP (ref 0–0)
PLATELET # BLD AUTO: 114 K/UL — LOW (ref 150–400)
PMV BLD: 12.4 FL — SIGNIFICANT CHANGE UP (ref 7–13)
POTASSIUM SERPL-MCNC: 4.3 MMOL/L — SIGNIFICANT CHANGE UP (ref 3.5–5.3)
POTASSIUM SERPL-SCNC: 4.3 MMOL/L — SIGNIFICANT CHANGE UP (ref 3.5–5.3)
RBC # BLD: 3.72 M/UL — LOW (ref 4.2–5.8)
RBC # FLD: 14.9 % — HIGH (ref 10.3–14.5)
SODIUM SERPL-SCNC: 142 MMOL/L — SIGNIFICANT CHANGE UP (ref 135–145)
WBC # BLD: 7.61 K/UL — SIGNIFICANT CHANGE UP (ref 3.8–10.5)
WBC # FLD AUTO: 7.61 K/UL — SIGNIFICANT CHANGE UP (ref 3.8–10.5)

## 2019-04-05 PROCEDURE — 99233 SBSQ HOSP IP/OBS HIGH 50: CPT | Mod: GC

## 2019-04-05 RX ORDER — OXYCODONE AND ACETAMINOPHEN 5; 325 MG/1; MG/1
1 TABLET ORAL ONCE
Qty: 0 | Refills: 0 | Status: DISCONTINUED | OUTPATIENT
Start: 2019-04-05 | End: 2019-04-05

## 2019-04-05 RX ORDER — ACETAMINOPHEN 500 MG
650 TABLET ORAL ONCE
Qty: 0 | Refills: 0 | Status: COMPLETED | OUTPATIENT
Start: 2019-04-05 | End: 2019-04-05

## 2019-04-05 RX ORDER — CIPROFLOXACIN LACTATE 400MG/40ML
400 VIAL (ML) INTRAVENOUS EVERY 12 HOURS
Qty: 0 | Refills: 0 | Status: DISCONTINUED | OUTPATIENT
Start: 2019-04-05 | End: 2019-04-09

## 2019-04-05 RX ORDER — CIPROFLOXACIN LACTATE 400MG/40ML
400 VIAL (ML) INTRAVENOUS ONCE
Qty: 0 | Refills: 0 | Status: COMPLETED | OUTPATIENT
Start: 2019-04-05 | End: 2019-04-05

## 2019-04-05 RX ORDER — CIPROFLOXACIN LACTATE 400MG/40ML
VIAL (ML) INTRAVENOUS
Qty: 0 | Refills: 0 | Status: DISCONTINUED | OUTPATIENT
Start: 2019-04-05 | End: 2019-04-09

## 2019-04-05 RX ORDER — CHLORHEXIDINE GLUCONATE 213 G/1000ML
1 SOLUTION TOPICAL
Qty: 0 | Refills: 0 | Status: DISCONTINUED | OUTPATIENT
Start: 2019-04-05 | End: 2019-04-09

## 2019-04-05 RX ADMIN — Medication 200 MILLIGRAM(S): at 12:01

## 2019-04-05 RX ADMIN — Medication 3 MILLILITER(S): at 03:26

## 2019-04-05 RX ADMIN — SODIUM CHLORIDE 3 MILLILITER(S): 9 INJECTION INTRAMUSCULAR; INTRAVENOUS; SUBCUTANEOUS at 15:39

## 2019-04-05 RX ADMIN — Medication 250 MILLIGRAM(S): at 09:26

## 2019-04-05 RX ADMIN — Medication 650 MILLIGRAM(S): at 01:44

## 2019-04-05 RX ADMIN — Medication 3 MILLILITER(S): at 09:58

## 2019-04-05 RX ADMIN — Medication 250 MILLIGRAM(S): at 22:00

## 2019-04-05 RX ADMIN — Medication 3 MILLILITER(S): at 15:39

## 2019-04-05 RX ADMIN — SODIUM CHLORIDE 3 MILLILITER(S): 9 INJECTION INTRAMUSCULAR; INTRAVENOUS; SUBCUTANEOUS at 09:58

## 2019-04-05 RX ADMIN — OXYCODONE AND ACETAMINOPHEN 1 TABLET(S): 5; 325 TABLET ORAL at 08:35

## 2019-04-05 RX ADMIN — MEROPENEM 100 MILLIGRAM(S): 1 INJECTION INTRAVENOUS at 06:09

## 2019-04-05 RX ADMIN — Medication 200 MILLIGRAM(S): at 23:02

## 2019-04-05 RX ADMIN — LOSARTAN POTASSIUM 25 MILLIGRAM(S): 100 TABLET, FILM COATED ORAL at 06:07

## 2019-04-05 RX ADMIN — Medication 650 MILLIGRAM(S): at 02:15

## 2019-04-05 RX ADMIN — SPIRONOLACTONE 25 MILLIGRAM(S): 25 TABLET, FILM COATED ORAL at 06:08

## 2019-04-05 RX ADMIN — Medication 5 MILLIGRAM(S): at 06:07

## 2019-04-05 RX ADMIN — OXYCODONE AND ACETAMINOPHEN 1 TABLET(S): 5; 325 TABLET ORAL at 07:40

## 2019-04-05 RX ADMIN — Medication 25 MILLIGRAM(S): at 06:07

## 2019-04-05 RX ADMIN — Medication 3 MILLILITER(S): at 22:12

## 2019-04-05 NOTE — PROGRESS NOTE ADULT - SUBJECTIVE AND OBJECTIVE BOX
CHIEF COMPLAINT:    Interval Events:    REVIEW OF SYSTEMS:  Constitutional:   Eyes:  ENT:  CV:  Resp:  GI:  :  MSK:  Integumentary:  Neurological:  Psychiatric:  Endocrine:  Hematologic/Lymphatic:  Allergic/Immunologic:  [ ] All other systems negative  [ ] Unable to assess ROS because ________    OBJECTIVE:  ICU Vital Signs Last 24 Hrs  T(C): 36.6 (05 Apr 2019 06:04), Max: 37 (04 Apr 2019 21:48)  T(F): 97.8 (05 Apr 2019 06:04), Max: 98.6 (04 Apr 2019 21:48)  HR: 100 (05 Apr 2019 06:04) (95 - 103)  BP: 121/92 (05 Apr 2019 06:04) (117/77 - 126/96)  BP(mean): --  ABP: --  ABP(mean): --  RR: 18 (05 Apr 2019 06:04) (18 - 19)  SpO2: 97% (05 Apr 2019 06:04) (94% - 98%)        CAPILLARY BLOOD GLUCOSE          PHYSICAL EXAM:  General:   HEENT:   Lymph Nodes:  Neck:   Respiratory:   Cardiovascular:   Abdomen:   Extremities:   Skin:   Neurological:  Psychiatry:    HOSPITAL MEDICATIONS:  MEDICATIONS  (STANDING):  ALBUTerol/ipratropium for Nebulization 3 milliLiter(s) Nebulizer every 6 hours  aspirin enteric coated 81 milliGRAM(s) Oral daily  influenza   Vaccine 0.5 milliLiter(s) IntraMuscular once  losartan 25 milliGRAM(s) Oral daily  meropenem  IVPB 1000 milliGRAM(s) IV Intermittent every 8 hours  metoprolol succinate ER 25 milliGRAM(s) Oral daily  oxyCODONE    5 mG/acetaminophen 325 mG 1 Tablet(s) Oral once  predniSONE   Tablet 5 milliGRAM(s) Oral daily  sodium chloride 3%  Inhalation 3 milliLiter(s) Inhalation every 6 hours  spironolactone 25 milliGRAM(s) Oral daily  vancomycin  IVPB 1000 milliGRAM(s) IV Intermittent every 12 hours    MEDICATIONS  (PRN):      LABS:                        10.0   7.61  )-----------( 114      ( 05 Apr 2019 06:14 )             34.0     04-05    142  |  98  |  13  ----------------------------<  95  4.3   |  34<H>  |  1.00    Ca    9.1      05 Apr 2019 06:14                MICROBIOLOGY:     RADIOLOGY:  [ ] Reviewed and interpreted by me    PULMONARY FUNCTION TESTS:    EKG: CHIEF COMPLAINT:  hemoptysis     Interval Events: no acute events overnight      OBJECTIVE:  ICU Vital Signs Last 24 Hrs  T(C): 36.6 (05 Apr 2019 06:04), Max: 37 (04 Apr 2019 21:48)  T(F): 97.8 (05 Apr 2019 06:04), Max: 98.6 (04 Apr 2019 21:48)  HR: 100 (05 Apr 2019 06:04) (95 - 103)  BP: 121/92 (05 Apr 2019 06:04) (117/77 - 126/96)  BP(mean): --  ABP: --  ABP(mean): --  RR: 18 (05 Apr 2019 06:04) (18 - 19)  SpO2: 97% (05 Apr 2019 06:04) (94% - 98%)        CAPILLARY BLOOD GLUCOSE          HOSPITAL MEDICATIONS:  MEDICATIONS  (STANDING):  ALBUTerol/ipratropium for Nebulization 3 milliLiter(s) Nebulizer every 6 hours  aspirin enteric coated 81 milliGRAM(s) Oral daily  influenza   Vaccine 0.5 milliLiter(s) IntraMuscular once  losartan 25 milliGRAM(s) Oral daily  meropenem  IVPB 1000 milliGRAM(s) IV Intermittent every 8 hours  metoprolol succinate ER 25 milliGRAM(s) Oral daily  oxyCODONE    5 mG/acetaminophen 325 mG 1 Tablet(s) Oral once  predniSONE   Tablet 5 milliGRAM(s) Oral daily  sodium chloride 3%  Inhalation 3 milliLiter(s) Inhalation every 6 hours  spironolactone 25 milliGRAM(s) Oral daily  vancomycin  IVPB 1000 milliGRAM(s) IV Intermittent every 12 hours    MEDICATIONS  (PRN):      LABS:                        10.0   7.61  )-----------( 114      ( 05 Apr 2019 06:14 )             34.0     04-05    142  |  98  |  13  ----------------------------<  95  4.3   |  34<H>  |  1.00    Ca    9.1      05 Apr 2019 06:14                MICROBIOLOGY:     RADIOLOGY:  [ ] Reviewed and interpreted by me    PULMONARY FUNCTION TESTS:    EKG:

## 2019-04-05 NOTE — PROGRESS NOTE ADULT - PROBLEM SELECTOR PLAN 1
- with fever to 102.6  - sputum culture with pseudomonas and staph, pending final sensitivity  - blood cultures negative to date  - cont vanco/cici at this time pending sensitivity   - CT chest without acute changes  - monitor fevers and WBC - 4/6 - afebrile   - sputum culture with pseudomonas and staph  - blood cultures negative to date  - cont vanco   -merrem dc'd  - 4/6 - started on cipro IV   plan - to be dc'd on cipro and bactrim  - CT chest without acute changes  - monitor fevers and WBC - 4/6 - afebrile   - sputum culture with pseudomonas and staph  - blood cultures negative to date  - mrsa and pseud sens to cipro  - cont vanco   -merrem dc'd/cipro initiated    plan - to be dc'd on cipro and bactrim  - CT chest without acute changes  - monitor fevers and WBC

## 2019-04-05 NOTE — PROGRESS NOTE ADULT - PROBLEM SELECTOR PLAN 5
- not on AC 2/2 recurrent hemoptysis  - slightly tachycardic - cont metoprolol at current dose, monitor for now as tachycardia may be related to current acute status/fever and increase if necessary - not on AC 2/2 recurrent hemoptysis  -  tachycardia resolving - cont metoprolol at current dose, monitor for now as tachycardia may be related to current acute status/fever and increase if necessary

## 2019-04-06 LAB
ANION GAP SERPL CALC-SCNC: 10 MMO/L — SIGNIFICANT CHANGE UP (ref 7–14)
BUN SERPL-MCNC: 12 MG/DL — SIGNIFICANT CHANGE UP (ref 7–23)
CALCIUM SERPL-MCNC: 9 MG/DL — SIGNIFICANT CHANGE UP (ref 8.4–10.5)
CHLORIDE SERPL-SCNC: 98 MMOL/L — SIGNIFICANT CHANGE UP (ref 98–107)
CO2 SERPL-SCNC: 34 MMOL/L — HIGH (ref 22–31)
CREAT SERPL-MCNC: 1.04 MG/DL — SIGNIFICANT CHANGE UP (ref 0.5–1.3)
GLUCOSE SERPL-MCNC: 90 MG/DL — SIGNIFICANT CHANGE UP (ref 70–99)
HCT VFR BLD CALC: 35.2 % — LOW (ref 39–50)
HGB BLD-MCNC: 10.1 G/DL — LOW (ref 13–17)
MCHC RBC-ENTMCNC: 26.8 PG — LOW (ref 27–34)
MCHC RBC-ENTMCNC: 28.7 % — LOW (ref 32–36)
MCV RBC AUTO: 93.4 FL — SIGNIFICANT CHANGE UP (ref 80–100)
NRBC # FLD: 0 K/UL — SIGNIFICANT CHANGE UP (ref 0–0)
PLATELET # BLD AUTO: 130 K/UL — LOW (ref 150–400)
PMV BLD: 12.6 FL — SIGNIFICANT CHANGE UP (ref 7–13)
POTASSIUM SERPL-MCNC: 4.1 MMOL/L — SIGNIFICANT CHANGE UP (ref 3.5–5.3)
POTASSIUM SERPL-SCNC: 4.1 MMOL/L — SIGNIFICANT CHANGE UP (ref 3.5–5.3)
RBC # BLD: 3.77 M/UL — LOW (ref 4.2–5.8)
RBC # FLD: 14.8 % — HIGH (ref 10.3–14.5)
SODIUM SERPL-SCNC: 142 MMOL/L — SIGNIFICANT CHANGE UP (ref 135–145)
WBC # BLD: 7.01 K/UL — SIGNIFICANT CHANGE UP (ref 3.8–10.5)
WBC # FLD AUTO: 7.01 K/UL — SIGNIFICANT CHANGE UP (ref 3.8–10.5)

## 2019-04-06 RX ORDER — SPIRONOLACTONE 25 MG/1
50 TABLET, FILM COATED ORAL DAILY
Qty: 0 | Refills: 0 | Status: DISCONTINUED | OUTPATIENT
Start: 2019-04-07 | End: 2019-04-09

## 2019-04-06 RX ADMIN — SPIRONOLACTONE 25 MILLIGRAM(S): 25 TABLET, FILM COATED ORAL at 06:36

## 2019-04-06 RX ADMIN — Medication 250 MILLIGRAM(S): at 09:57

## 2019-04-06 RX ADMIN — CHLORHEXIDINE GLUCONATE 1 APPLICATION(S): 213 SOLUTION TOPICAL at 09:57

## 2019-04-06 RX ADMIN — Medication 3 MILLILITER(S): at 17:10

## 2019-04-06 RX ADMIN — Medication 3 MILLILITER(S): at 21:07

## 2019-04-06 RX ADMIN — SODIUM CHLORIDE 3 MILLILITER(S): 9 INJECTION INTRAMUSCULAR; INTRAVENOUS; SUBCUTANEOUS at 21:35

## 2019-04-06 RX ADMIN — Medication 250 MILLIGRAM(S): at 22:50

## 2019-04-06 RX ADMIN — Medication 200 MILLIGRAM(S): at 17:20

## 2019-04-06 RX ADMIN — Medication 3 MILLILITER(S): at 04:40

## 2019-04-06 RX ADMIN — SODIUM CHLORIDE 3 MILLILITER(S): 9 INJECTION INTRAMUSCULAR; INTRAVENOUS; SUBCUTANEOUS at 17:11

## 2019-04-06 RX ADMIN — LOSARTAN POTASSIUM 25 MILLIGRAM(S): 100 TABLET, FILM COATED ORAL at 06:36

## 2019-04-06 RX ADMIN — Medication 25 MILLIGRAM(S): at 06:36

## 2019-04-06 RX ADMIN — Medication 5 MILLIGRAM(S): at 06:36

## 2019-04-06 RX ADMIN — Medication 200 MILLIGRAM(S): at 06:36

## 2019-04-06 NOTE — PROGRESS NOTE ADULT - PROBLEM SELECTOR PLAN 4
- nebs  - spiriva  - supplemental o2 as needed  - trach is capped - possibly decannulate on monday as secretions improved

## 2019-04-06 NOTE — PROGRESS NOTE ADULT - PROBLEM SELECTOR PLAN 1
- afebrile now  - sputum culture with pseudomonas and MRSA  - CT chest without acute changes  - blood cultures negative to date  - cont vanco and cipro  - likely to be d/c'd on cipro and bactrim  - monitor fevers and WBC

## 2019-04-06 NOTE — PROGRESS NOTE ADULT - SUBJECTIVE AND OBJECTIVE BOX
CHIEF COMPLAINT: Patient is a 48y old  Male who presents with a chief complaint of hemoptysis (05 Apr 2019 07:10)    Interval Events:      REVIEW OF SYSTEMS:  Constitutional:   Eyes:  ENT:  CV:  Resp:  GI:  :  MSK:  Integumentary:  Neurological:  Psychiatric:  Endocrine:  Hematologic/Lymphatic:  Allergic/Immunologic:  [ ] All other systems negative  [ ] Unable to assess ROS because ________      OBJECTIVE:  ICU Vital Signs Last 24 Hrs  T(C): 36.4 (06 Apr 2019 06:32), Max: 36.8 (05 Apr 2019 22:44)  T(F): 97.5 (06 Apr 2019 06:32), Max: 98.3 (05 Apr 2019 22:44)  HR: 100 (06 Apr 2019 06:32) (81 - 102)  BP: 129/93 (06 Apr 2019 06:32) (128/91 - 133/97)  BP(mean): --  ABP: --  ABP(mean): --  RR: 18 (06 Apr 2019 06:32) (18 - 18)  SpO2: 97% (06 Apr 2019 06:32) (96% - 98%)    04-05 @ 07:01  -  04-06 @ 07:00  --------------------------------------------------------  IN: 0 mL / OUT: 200 mL / NET: -200 mL    HOSPITAL MEDICATIONS:  MEDICATIONS  (STANDING):  ALBUTerol/ipratropium for Nebulization 3 milliLiter(s) Nebulizer every 6 hours  aspirin enteric coated 81 milliGRAM(s) Oral daily  chlorhexidine 4% Liquid 1 Application(s) Topical <User Schedule>  ciprofloxacin   IVPB      ciprofloxacin   IVPB 400 milliGRAM(s) IV Intermittent every 12 hours  influenza   Vaccine 0.5 milliLiter(s) IntraMuscular once  losartan 25 milliGRAM(s) Oral daily  metoprolol succinate ER 25 milliGRAM(s) Oral daily  predniSONE   Tablet 5 milliGRAM(s) Oral daily  sodium chloride 3%  Inhalation 3 milliLiter(s) Inhalation every 6 hours  vancomycin  IVPB 1000 milliGRAM(s) IV Intermittent every 12 hours    MEDICATIONS  (PRN):      LABS:                        10.1   7.01  )-----------( 130      ( 06 Apr 2019 05:30 )             35.2     04-06    142  |  98  |  12  ----------------------------<  90  4.1   |  34<H>  |  1.04    Ca    9.0      06 Apr 2019 05:30                MICROBIOLOGY:     RADIOLOGY:  [ ] Reviewed and interpreted by me    PULMONARY FUNCTION TESTS:    EKG: CHIEF COMPLAINT: Patient is a 48y old  Male who presents with a chief complaint of hemoptysis (05 Apr 2019 07:10)    Interval Events: none overnight      REVIEW OF SYSTEMS:  Constitutional: no complaints  CV: denies  Resp: less sputum  GI: denies  [x] All other systems negative  [ ] Unable to assess ROS because ________      OBJECTIVE:  ICU Vital Signs Last 24 Hrs  T(C): 36.4 (06 Apr 2019 06:32), Max: 36.8 (05 Apr 2019 22:44)  T(F): 97.5 (06 Apr 2019 06:32), Max: 98.3 (05 Apr 2019 22:44)  HR: 100 (06 Apr 2019 06:32) (81 - 102)  BP: 129/93 (06 Apr 2019 06:32) (128/91 - 133/97)  BP(mean): --  ABP: --  ABP(mean): --  RR: 18 (06 Apr 2019 06:32) (18 - 18)  SpO2: 97% (06 Apr 2019 06:32) (96% - 98%)    04-05 @ 07:01  -  04-06 @ 07:00  --------------------------------------------------------  IN: 0 mL / OUT: 200 mL / NET: -200 mL    HOSPITAL MEDICATIONS:  MEDICATIONS  (STANDING):  ALBUTerol/ipratropium for Nebulization 3 milliLiter(s) Nebulizer every 6 hours  aspirin enteric coated 81 milliGRAM(s) Oral daily  chlorhexidine 4% Liquid 1 Application(s) Topical <User Schedule>  ciprofloxacin   IVPB      ciprofloxacin   IVPB 400 milliGRAM(s) IV Intermittent every 12 hours  influenza   Vaccine 0.5 milliLiter(s) IntraMuscular once  losartan 25 milliGRAM(s) Oral daily  metoprolol succinate ER 25 milliGRAM(s) Oral daily  predniSONE   Tablet 5 milliGRAM(s) Oral daily  sodium chloride 3%  Inhalation 3 milliLiter(s) Inhalation every 6 hours  vancomycin  IVPB 1000 milliGRAM(s) IV Intermittent every 12 hours    MEDICATIONS  (PRN):      LABS:                        10.1   7.01  )-----------( 130      ( 06 Apr 2019 05:30 )             35.2     04-06    142  |  98  |  12  ----------------------------<  90  4.1   |  34<H>  |  1.04    Ca    9.0      06 Apr 2019 05:30

## 2019-04-07 LAB
ANION GAP SERPL CALC-SCNC: 10 MMO/L — SIGNIFICANT CHANGE UP (ref 7–14)
BACTERIA BLD CULT: SIGNIFICANT CHANGE UP
BACTERIA BLD CULT: SIGNIFICANT CHANGE UP
BUN SERPL-MCNC: 14 MG/DL — SIGNIFICANT CHANGE UP (ref 7–23)
CALCIUM SERPL-MCNC: 9.6 MG/DL — SIGNIFICANT CHANGE UP (ref 8.4–10.5)
CHLORIDE SERPL-SCNC: 97 MMOL/L — LOW (ref 98–107)
CO2 SERPL-SCNC: 36 MMOL/L — HIGH (ref 22–31)
CREAT SERPL-MCNC: 1.12 MG/DL — SIGNIFICANT CHANGE UP (ref 0.5–1.3)
GLUCOSE SERPL-MCNC: 86 MG/DL — SIGNIFICANT CHANGE UP (ref 70–99)
HCT VFR BLD CALC: 35.9 % — LOW (ref 39–50)
HGB BLD-MCNC: 10.5 G/DL — LOW (ref 13–17)
MCHC RBC-ENTMCNC: 26.8 PG — LOW (ref 27–34)
MCHC RBC-ENTMCNC: 29.2 % — LOW (ref 32–36)
MCV RBC AUTO: 91.6 FL — SIGNIFICANT CHANGE UP (ref 80–100)
NRBC # FLD: 0 K/UL — SIGNIFICANT CHANGE UP (ref 0–0)
PLATELET # BLD AUTO: 136 K/UL — LOW (ref 150–400)
PMV BLD: 12.6 FL — SIGNIFICANT CHANGE UP (ref 7–13)
POTASSIUM SERPL-MCNC: 4.3 MMOL/L — SIGNIFICANT CHANGE UP (ref 3.5–5.3)
POTASSIUM SERPL-SCNC: 4.3 MMOL/L — SIGNIFICANT CHANGE UP (ref 3.5–5.3)
RBC # BLD: 3.92 M/UL — LOW (ref 4.2–5.8)
RBC # FLD: 14.8 % — HIGH (ref 10.3–14.5)
SODIUM SERPL-SCNC: 143 MMOL/L — SIGNIFICANT CHANGE UP (ref 135–145)
WBC # BLD: 6.15 K/UL — SIGNIFICANT CHANGE UP (ref 3.8–10.5)
WBC # FLD AUTO: 6.15 K/UL — SIGNIFICANT CHANGE UP (ref 3.8–10.5)

## 2019-04-07 PROCEDURE — 99233 SBSQ HOSP IP/OBS HIGH 50: CPT

## 2019-04-07 RX ADMIN — Medication 250 MILLIGRAM(S): at 10:30

## 2019-04-07 RX ADMIN — SODIUM CHLORIDE 3 MILLILITER(S): 9 INJECTION INTRAMUSCULAR; INTRAVENOUS; SUBCUTANEOUS at 11:58

## 2019-04-07 RX ADMIN — SODIUM CHLORIDE 3 MILLILITER(S): 9 INJECTION INTRAMUSCULAR; INTRAVENOUS; SUBCUTANEOUS at 22:12

## 2019-04-07 RX ADMIN — CHLORHEXIDINE GLUCONATE 1 APPLICATION(S): 213 SOLUTION TOPICAL at 10:30

## 2019-04-07 RX ADMIN — Medication 250 MILLIGRAM(S): at 22:21

## 2019-04-07 RX ADMIN — Medication 200 MILLIGRAM(S): at 06:47

## 2019-04-07 RX ADMIN — Medication 5 MILLIGRAM(S): at 06:48

## 2019-04-07 RX ADMIN — SPIRONOLACTONE 50 MILLIGRAM(S): 25 TABLET, FILM COATED ORAL at 06:47

## 2019-04-07 RX ADMIN — Medication 3 MILLILITER(S): at 11:39

## 2019-04-07 RX ADMIN — Medication 25 MILLIGRAM(S): at 06:48

## 2019-04-07 RX ADMIN — Medication 200 MILLIGRAM(S): at 17:11

## 2019-04-07 RX ADMIN — SODIUM CHLORIDE 3 MILLILITER(S): 9 INJECTION INTRAMUSCULAR; INTRAVENOUS; SUBCUTANEOUS at 04:22

## 2019-04-07 RX ADMIN — Medication 3 MILLILITER(S): at 16:50

## 2019-04-07 RX ADMIN — Medication 3 MILLILITER(S): at 04:01

## 2019-04-07 RX ADMIN — Medication 3 MILLILITER(S): at 21:55

## 2019-04-07 RX ADMIN — LOSARTAN POTASSIUM 25 MILLIGRAM(S): 100 TABLET, FILM COATED ORAL at 06:48

## 2019-04-07 NOTE — PROGRESS NOTE ADULT - SUBJECTIVE AND OBJECTIVE BOX
CHIEF COMPLAINT:  Patient is a 48y old  Male who presents with a chief complaint of   Interval Events:    REVIEW OF SYSTEMS:  Constitutional:   Eyes:  ENT:  CV:  Resp:  GI:  :  MSK:  Integumentary:  Neurological:  Psychiatric:  Endocrine:  Hematologic/Lymphatic:  Allergic/Immunologic:  [ ] All other systems negative  [ ] Unable to assess ROS because ________    OBJECTIVE:  ICU Vital Signs Last 24 Hrs  T(C): 36.6 (07 Apr 2019 06:44), Max: 36.6 (06 Apr 2019 22:45)  T(F): 97.9 (07 Apr 2019 06:44), Max: 97.9 (06 Apr 2019 22:45)  HR: 98 (07 Apr 2019 06:44) (90 - 114)  BP: 121/86 (07 Apr 2019 06:44) (110/81 - 130/95)  BP(mean): --  ABP: --  ABP(mean): --  RR: 18 (07 Apr 2019 06:44) (18 - 18)  SpO2: 94% (07 Apr 2019 06:44) (94% - 100%)        CAPILLARY BLOOD GLUCOSE          PHYSICAL EXAM:  General:   HEENT:   Lymph Nodes:  Neck:   Respiratory:   Cardiovascular:   Abdomen:   Extremities:   Skin:   Neurological:  Psychiatry:    HOSPITAL MEDICATIONS:  MEDICATIONS  (STANDING):  ALBUTerol/ipratropium for Nebulization 3 milliLiter(s) Nebulizer every 6 hours  aspirin enteric coated 81 milliGRAM(s) Oral daily  chlorhexidine 4% Liquid 1 Application(s) Topical <User Schedule>  ciprofloxacin   IVPB      ciprofloxacin   IVPB 400 milliGRAM(s) IV Intermittent every 12 hours  influenza   Vaccine 0.5 milliLiter(s) IntraMuscular once  losartan 25 milliGRAM(s) Oral daily  metoprolol succinate ER 25 milliGRAM(s) Oral daily  predniSONE   Tablet 5 milliGRAM(s) Oral daily  sodium chloride 3%  Inhalation 3 milliLiter(s) Inhalation every 6 hours  spironolactone 50 milliGRAM(s) Oral daily  vancomycin  IVPB 1000 milliGRAM(s) IV Intermittent every 12 hours    MEDICATIONS  (PRN):      LABS:                        10.5   6.15  )-----------( 136      ( 07 Apr 2019 05:53 )             35.9     04-07    143  |  97<L>  |  14  ----------------------------<  86  4.3   |  36<H>  |  1.12    Ca    9.6      07 Apr 2019 05:53                MICROBIOLOGY:     RADIOLOGY:  [ ] Reviewed and interpreted by me    PULMONARY FUNCTION TESTS:    EKG:    I&O's Summary CHIEF COMPLAINT:  Patient is a 48y old  Male who presents with a chief complaint of fever and hemoptysis  Interval Events: No new events overnight    REVIEW OF SYSTEMS:  Constitutional: No acute distress  CV: Denies  Resp: Denies  GI: Denies  [ X ] All other systems negative    OBJECTIVE:  ICU Vital Signs Last 24 Hrs  T(C): 36.6 (07 Apr 2019 06:44), Max: 36.6 (06 Apr 2019 22:45)  T(F): 97.9 (07 Apr 2019 06:44), Max: 97.9 (06 Apr 2019 22:45)  HR: 98 (07 Apr 2019 06:44) (90 - 114)  BP: 121/86 (07 Apr 2019 06:44) (110/81 - 130/95)  BP(mean): --  ABP: --  ABP(mean): --  RR: 18 (07 Apr 2019 06:44) (18 - 18)  SpO2: 94% (07 Apr 2019 06:44) (94% - 100%)    CAPILLARY BLOOD GLUCOSE    Physical Exam:   · Constitutional	detailed exam	  · Constitutional Details	no distress	  · Eyes	EOMI; PERRL; no drainage or redness	  · ENMT	No oral lesions; no gross abnormalities	  · Neck	detailed exam 	  · Neck Details	tracheostomy present	  · Respiratory	detailed exam	  · Respiratory Details	airway patent; breath sounds equal; good air movement	  · Cardiovascular	detailed exam	  · Cardiovascular Details	regular rate and rhythm  no rub  no murmur 	  · Gastrointestinal	detailed exam	  · GI Normal	soft; nontender	  · Extremities	detailed exam 	  · Extremities Details	no clubbing; no cyanosis; no pedal edema	  · Neurological	detailed exam	  · Neurological Details	alert and oriented x 3; responds to pain; responds to verbal commands	  · Musculoskeletal	detailed exam	  · Musculoskeletal Details	ROM intact	    HOSPITAL MEDICATIONS:  MEDICATIONS  (STANDING):  ALBUTerol/ipratropium for Nebulization 3 milliLiter(s) Nebulizer every 6 hours  aspirin enteric coated 81 milliGRAM(s) Oral daily  chlorhexidine 4% Liquid 1 Application(s) Topical <User Schedule>  ciprofloxacin   IVPB      ciprofloxacin   IVPB 400 milliGRAM(s) IV Intermittent every 12 hours  influenza   Vaccine 0.5 milliLiter(s) IntraMuscular once  losartan 25 milliGRAM(s) Oral daily  metoprolol succinate ER 25 milliGRAM(s) Oral daily  predniSONE   Tablet 5 milliGRAM(s) Oral daily  sodium chloride 3%  Inhalation 3 milliLiter(s) Inhalation every 6 hours  spironolactone 50 milliGRAM(s) Oral daily  vancomycin  IVPB 1000 milliGRAM(s) IV Intermittent every 12 hours    MEDICATIONS  (PRN):      LABS:                        10.5   6.15  )-----------( 136      ( 07 Apr 2019 05:53 )             35.9     04-07    143  |  97<L>  |  14  ----------------------------<  86  4.3   |  36<H>  |  1.12    Ca    9.6      07 Apr 2019 05:53

## 2019-04-08 LAB
ANION GAP SERPL CALC-SCNC: 12 MMO/L — SIGNIFICANT CHANGE UP (ref 7–14)
BUN SERPL-MCNC: 17 MG/DL — SIGNIFICANT CHANGE UP (ref 7–23)
CALCIUM SERPL-MCNC: 9.8 MG/DL — SIGNIFICANT CHANGE UP (ref 8.4–10.5)
CHLORIDE SERPL-SCNC: 97 MMOL/L — LOW (ref 98–107)
CO2 SERPL-SCNC: 34 MMOL/L — HIGH (ref 22–31)
CREAT SERPL-MCNC: 1.1 MG/DL — SIGNIFICANT CHANGE UP (ref 0.5–1.3)
GLUCOSE SERPL-MCNC: 94 MG/DL — SIGNIFICANT CHANGE UP (ref 70–99)
HCT VFR BLD CALC: 38.3 % — LOW (ref 39–50)
HGB BLD-MCNC: 11.1 G/DL — LOW (ref 13–17)
MCHC RBC-ENTMCNC: 27.1 PG — SIGNIFICANT CHANGE UP (ref 27–34)
MCHC RBC-ENTMCNC: 29 % — LOW (ref 32–36)
MCV RBC AUTO: 93.6 FL — SIGNIFICANT CHANGE UP (ref 80–100)
NRBC # FLD: 0 K/UL — SIGNIFICANT CHANGE UP (ref 0–0)
PLATELET # BLD AUTO: 140 K/UL — LOW (ref 150–400)
PMV BLD: 12 FL — SIGNIFICANT CHANGE UP (ref 7–13)
POTASSIUM SERPL-MCNC: 4.6 MMOL/L — SIGNIFICANT CHANGE UP (ref 3.5–5.3)
POTASSIUM SERPL-SCNC: 4.6 MMOL/L — SIGNIFICANT CHANGE UP (ref 3.5–5.3)
RBC # BLD: 4.09 M/UL — LOW (ref 4.2–5.8)
RBC # FLD: 14.6 % — HIGH (ref 10.3–14.5)
SODIUM SERPL-SCNC: 143 MMOL/L — SIGNIFICANT CHANGE UP (ref 135–145)
VANCOMYCIN FLD-MCNC: 18.4 UG/ML — SIGNIFICANT CHANGE UP
WBC # BLD: 6.34 K/UL — SIGNIFICANT CHANGE UP (ref 3.8–10.5)
WBC # FLD AUTO: 6.34 K/UL — SIGNIFICANT CHANGE UP (ref 3.8–10.5)

## 2019-04-08 PROCEDURE — 99233 SBSQ HOSP IP/OBS HIGH 50: CPT | Mod: GC

## 2019-04-08 RX ADMIN — Medication 250 MILLIGRAM(S): at 22:26

## 2019-04-08 RX ADMIN — LOSARTAN POTASSIUM 25 MILLIGRAM(S): 100 TABLET, FILM COATED ORAL at 06:33

## 2019-04-08 RX ADMIN — Medication 3 MILLILITER(S): at 09:51

## 2019-04-08 RX ADMIN — SPIRONOLACTONE 50 MILLIGRAM(S): 25 TABLET, FILM COATED ORAL at 06:33

## 2019-04-08 RX ADMIN — CHLORHEXIDINE GLUCONATE 1 APPLICATION(S): 213 SOLUTION TOPICAL at 09:33

## 2019-04-08 RX ADMIN — Medication 3 MILLILITER(S): at 04:35

## 2019-04-08 RX ADMIN — Medication 3 MILLILITER(S): at 16:14

## 2019-04-08 RX ADMIN — Medication 200 MILLIGRAM(S): at 17:16

## 2019-04-08 RX ADMIN — Medication 25 MILLIGRAM(S): at 06:33

## 2019-04-08 RX ADMIN — Medication 200 MILLIGRAM(S): at 06:33

## 2019-04-08 RX ADMIN — Medication 5 MILLIGRAM(S): at 06:33

## 2019-04-08 RX ADMIN — Medication 250 MILLIGRAM(S): at 09:32

## 2019-04-08 RX ADMIN — SODIUM CHLORIDE 3 MILLILITER(S): 9 INJECTION INTRAMUSCULAR; INTRAVENOUS; SUBCUTANEOUS at 16:15

## 2019-04-08 RX ADMIN — SODIUM CHLORIDE 3 MILLILITER(S): 9 INJECTION INTRAMUSCULAR; INTRAVENOUS; SUBCUTANEOUS at 21:56

## 2019-04-08 RX ADMIN — SODIUM CHLORIDE 3 MILLILITER(S): 9 INJECTION INTRAMUSCULAR; INTRAVENOUS; SUBCUTANEOUS at 04:49

## 2019-04-08 RX ADMIN — Medication 81 MILLIGRAM(S): at 11:31

## 2019-04-08 RX ADMIN — Medication 3 MILLILITER(S): at 21:42

## 2019-04-08 RX ADMIN — SODIUM CHLORIDE 3 MILLILITER(S): 9 INJECTION INTRAMUSCULAR; INTRAVENOUS; SUBCUTANEOUS at 09:52

## 2019-04-08 NOTE — PROGRESS NOTE ADULT - SUBJECTIVE AND OBJECTIVE BOX
CHIEF COMPLAINT: Patient is a 48y old  Male who presents with a chief complaint of hemoptysis (07 Apr 2019 07:36)    Interval Events:      REVIEW OF SYSTEMS:  Constitutional:   Eyes:  ENT:  CV:  Resp:  GI:  :  MSK:  Integumentary:  Neurological:  Psychiatric:  Endocrine:  Hematologic/Lymphatic:  Allergic/Immunologic:  [ ] All other systems negative  [ ] Unable to assess ROS because ________      OBJECTIVE:  ICU Vital Signs Last 24 Hrs  T(C): 36.4 (08 Apr 2019 06:31), Max: 36.8 (07 Apr 2019 14:00)  T(F): 97.6 (08 Apr 2019 06:31), Max: 98.3 (07 Apr 2019 14:00)  HR: 104 (08 Apr 2019 06:31) (98 - 115)  BP: 117/76 (08 Apr 2019 06:31) (117/76 - 126/94)  BP(mean): --  ABP: --  ABP(mean): --  RR: 18 (08 Apr 2019 06:31) (18 - 18)  SpO2: 94% (08 Apr 2019 06:31) (94% - 97%)    HOSPITAL MEDICATIONS:  MEDICATIONS  (STANDING):  ALBUTerol/ipratropium for Nebulization 3 milliLiter(s) Nebulizer every 6 hours  aspirin enteric coated 81 milliGRAM(s) Oral daily  chlorhexidine 4% Liquid 1 Application(s) Topical <User Schedule>  ciprofloxacin   IVPB      ciprofloxacin   IVPB 400 milliGRAM(s) IV Intermittent every 12 hours  influenza   Vaccine 0.5 milliLiter(s) IntraMuscular once  losartan 25 milliGRAM(s) Oral daily  metoprolol succinate ER 25 milliGRAM(s) Oral daily  predniSONE   Tablet 5 milliGRAM(s) Oral daily  sodium chloride 3%  Inhalation 3 milliLiter(s) Inhalation every 6 hours  spironolactone 50 milliGRAM(s) Oral daily  vancomycin  IVPB 1000 milliGRAM(s) IV Intermittent every 12 hours    MEDICATIONS  (PRN):      LABS:                        11.1   6.34  )-----------( 140      ( 08 Apr 2019 05:04 )             38.3     04-08    143  |  97<L>  |  17  ----------------------------<  94  4.6   |  34<H>  |  1.10    Ca    9.8      08 Apr 2019 05:04                MICROBIOLOGY:     RADIOLOGY:  [ ] Reviewed and interpreted by me    PULMONARY FUNCTION TESTS:    EKG: CHIEF COMPLAINT: Patient is a 48y old  Male who presents with a chief complaint of hemoptysis (07 Apr 2019 07:36)    Interval Events: none overnight      REVIEW OF SYSTEMS:  Constitutional: no complaints  CV: denies  Resp: denies  GI: denies  [x] All other systems negative  [ ] Unable to assess ROS because ________      OBJECTIVE:  ICU Vital Signs Last 24 Hrs  T(C): 36.4 (08 Apr 2019 06:31), Max: 36.8 (07 Apr 2019 14:00)  T(F): 97.6 (08 Apr 2019 06:31), Max: 98.3 (07 Apr 2019 14:00)  HR: 104 (08 Apr 2019 06:31) (98 - 115)  BP: 117/76 (08 Apr 2019 06:31) (117/76 - 126/94)  BP(mean): --  ABP: --  ABP(mean): --  RR: 18 (08 Apr 2019 06:31) (18 - 18)  SpO2: 94% (08 Apr 2019 06:31) (94% - 97%)    HOSPITAL MEDICATIONS:  MEDICATIONS  (STANDING):  ALBUTerol/ipratropium for Nebulization 3 milliLiter(s) Nebulizer every 6 hours  aspirin enteric coated 81 milliGRAM(s) Oral daily  chlorhexidine 4% Liquid 1 Application(s) Topical <User Schedule>  ciprofloxacin   IVPB      ciprofloxacin   IVPB 400 milliGRAM(s) IV Intermittent every 12 hours  influenza   Vaccine 0.5 milliLiter(s) IntraMuscular once  losartan 25 milliGRAM(s) Oral daily  metoprolol succinate ER 25 milliGRAM(s) Oral daily  predniSONE   Tablet 5 milliGRAM(s) Oral daily  sodium chloride 3%  Inhalation 3 milliLiter(s) Inhalation every 6 hours  spironolactone 50 milliGRAM(s) Oral daily  vancomycin  IVPB 1000 milliGRAM(s) IV Intermittent every 12 hours    MEDICATIONS  (PRN):      LABS:                        11.1   6.34  )-----------( 140      ( 08 Apr 2019 05:04 )             38.3     04-08    143  |  97<L>  |  17  ----------------------------<  94  4.6   |  34<H>  |  1.10    Ca    9.8      08 Apr 2019 05:04

## 2019-04-09 ENCOUNTER — TRANSCRIPTION ENCOUNTER (OUTPATIENT)
Age: 49
End: 2019-04-09

## 2019-04-09 VITALS — WEIGHT: 218.26 LBS

## 2019-04-09 PROCEDURE — 99232 SBSQ HOSP IP/OBS MODERATE 35: CPT | Mod: GC

## 2019-04-09 RX ORDER — CIPROFLOXACIN LACTATE 400MG/40ML
500 VIAL (ML) INTRAVENOUS EVERY 12 HOURS
Qty: 0 | Refills: 0 | Status: DISCONTINUED | OUTPATIENT
Start: 2019-04-09 | End: 2019-04-09

## 2019-04-09 RX ORDER — MOXIFLOXACIN HYDROCHLORIDE TABLETS, 400 MG 400 MG/1
1 TABLET, FILM COATED ORAL
Qty: 14 | Refills: 0 | OUTPATIENT
Start: 2019-04-09 | End: 2019-04-15

## 2019-04-09 RX ORDER — SPIRONOLACTONE 25 MG/1
2 TABLET, FILM COATED ORAL
Qty: 60 | Refills: 0 | OUTPATIENT
Start: 2019-04-09 | End: 2019-05-08

## 2019-04-09 RX ORDER — ACETAMINOPHEN 500 MG
650 TABLET ORAL ONCE
Qty: 0 | Refills: 0 | Status: COMPLETED | OUTPATIENT
Start: 2019-04-09 | End: 2019-04-09

## 2019-04-09 RX ORDER — AZTREONAM 2 G
1 VIAL (EA) INJECTION
Qty: 14 | Refills: 0 | OUTPATIENT
Start: 2019-04-09 | End: 2019-04-15

## 2019-04-09 RX ORDER — IPRATROPIUM/ALBUTEROL SULFATE 18-103MCG
3 AEROSOL WITH ADAPTER (GRAM) INHALATION
Qty: 100 | Refills: 0 | OUTPATIENT
Start: 2019-04-09 | End: 2019-05-08

## 2019-04-09 RX ADMIN — SODIUM CHLORIDE 3 MILLILITER(S): 9 INJECTION INTRAMUSCULAR; INTRAVENOUS; SUBCUTANEOUS at 09:52

## 2019-04-09 RX ADMIN — Medication 3 MILLILITER(S): at 04:00

## 2019-04-09 RX ADMIN — CHLORHEXIDINE GLUCONATE 1 APPLICATION(S): 213 SOLUTION TOPICAL at 11:34

## 2019-04-09 RX ADMIN — Medication 1 TABLET(S): at 12:57

## 2019-04-09 RX ADMIN — Medication 81 MILLIGRAM(S): at 11:34

## 2019-04-09 RX ADMIN — Medication 200 MILLIGRAM(S): at 06:51

## 2019-04-09 RX ADMIN — Medication 5 MILLIGRAM(S): at 06:50

## 2019-04-09 RX ADMIN — Medication 25 MILLIGRAM(S): at 06:51

## 2019-04-09 RX ADMIN — Medication 650 MILLIGRAM(S): at 07:20

## 2019-04-09 RX ADMIN — LOSARTAN POTASSIUM 25 MILLIGRAM(S): 100 TABLET, FILM COATED ORAL at 06:50

## 2019-04-09 RX ADMIN — Medication 650 MILLIGRAM(S): at 06:50

## 2019-04-09 RX ADMIN — Medication 3 MILLILITER(S): at 09:52

## 2019-04-09 RX ADMIN — SPIRONOLACTONE 50 MILLIGRAM(S): 25 TABLET, FILM COATED ORAL at 06:50

## 2019-04-09 NOTE — PROGRESS NOTE ADULT - PROBLEM SELECTOR PLAN 1
- afebrile now  - sputum culture with pseudomonas and MRSA  - CT chest without acute changes  - blood cultures negative to date  - change to bactrim and cipro PO to complete 2 weeks total upon discharge today  - monitor fevers and WBC

## 2019-04-09 NOTE — PROGRESS NOTE ADULT - ASSESSMENT
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
tracheostomy 11/17 for acute hypoxic respiratory failure (weaned   off ventilator 2/18 no on 2L NC at home) and s/p PEG 12/18, broncho-  pulmonary fistula, thyroid arterial pesuodaneyrusm, C diffhemoptysis 2/2 DAVID bleed s/p   thoracotomy with DAVID lung lobectomy on 9/24/17 s/p IR embolization of Left   bronchial artery on 11/16/17 complicated by pneumothorax requiring chest   tube, s/p tracheostomy 11/17 for acute hypoxic respiratory failure (weaned   off ventilator 2/18 no on 2L NC at home) and s/p PEG 12/18, broncho-  pulmonary fistula, thyroid arterial pesuodaneyrusm, C diff p/w hemoptysis.

## 2019-04-09 NOTE — DIETITIAN INITIAL EVALUATION ADULT. - OTHER INFO
Nutrition assessment initiated for LOS. Pt. reports good appetite, PO intake, no recent wt. changes, no issues with PO intake .

## 2019-04-09 NOTE — PROGRESS NOTE ADULT - SUBJECTIVE AND OBJECTIVE BOX
CHIEF COMPLAINT: Patient is a 48y old  Male who presents with a chief complaint of hemoptysis (08 Apr 2019 07:11)    Interval Events:      REVIEW OF SYSTEMS:  Constitutional:   Eyes:  ENT:  CV:  Resp:  GI:  :  MSK:  Integumentary:  Neurological:  Psychiatric:  Endocrine:  Hematologic/Lymphatic:  Allergic/Immunologic:  [ ] All other systems negative  [ ] Unable to assess ROS because ________      OBJECTIVE:  ICU Vital Signs Last 24 Hrs  T(C): 36.6 (09 Apr 2019 06:48), Max: 36.7 (08 Apr 2019 11:29)  T(F): 97.8 (09 Apr 2019 06:48), Max: 98.1 (08 Apr 2019 11:29)  HR: 107 (09 Apr 2019 06:48) (102 - 114)  BP: 104/74 (09 Apr 2019 06:48) (104/74 - 118/89)  BP(mean): --  ABP: --  ABP(mean): --  RR: 18 (09 Apr 2019 06:48) (18 - 18)  SpO2: 97% (09 Apr 2019 06:48) (95% - 98%)    HOSPITAL MEDICATIONS:  MEDICATIONS  (STANDING):  ALBUTerol/ipratropium for Nebulization 3 milliLiter(s) Nebulizer every 6 hours  aspirin enteric coated 81 milliGRAM(s) Oral daily  chlorhexidine 4% Liquid 1 Application(s) Topical <User Schedule>  ciprofloxacin   IVPB      ciprofloxacin   IVPB 400 milliGRAM(s) IV Intermittent every 12 hours  influenza   Vaccine 0.5 milliLiter(s) IntraMuscular once  losartan 25 milliGRAM(s) Oral daily  metoprolol succinate ER 25 milliGRAM(s) Oral daily  predniSONE   Tablet 5 milliGRAM(s) Oral daily  sodium chloride 3%  Inhalation 3 milliLiter(s) Inhalation every 6 hours  spironolactone 50 milliGRAM(s) Oral daily  vancomycin  IVPB 1000 milliGRAM(s) IV Intermittent every 12 hours    MEDICATIONS  (PRN):      LABS:                        11.1   6.34  )-----------( 140      ( 08 Apr 2019 05:04 )             38.3     04-08    143  |  97<L>  |  17  ----------------------------<  94  4.6   |  34<H>  |  1.10    Ca    9.8      08 Apr 2019 05:04                MICROBIOLOGY:     RADIOLOGY:  [ ] Reviewed and interpreted by me    PULMONARY FUNCTION TESTS:    EKG: CHIEF COMPLAINT: Patient is a 48y old  Male who presents with a chief complaint of hemoptysis (08 Apr 2019 07:11)    Interval Events: none overnight      REVIEW OF SYSTEMS:  Constitutional: no complaints  CV: denies  Resp: denies  GI: denies  [x] All other systems negative  [ ] Unable to assess ROS because ________      OBJECTIVE:  ICU Vital Signs Last 24 Hrs  T(C): 36.6 (09 Apr 2019 06:48), Max: 36.7 (08 Apr 2019 11:29)  T(F): 97.8 (09 Apr 2019 06:48), Max: 98.1 (08 Apr 2019 11:29)  HR: 107 (09 Apr 2019 06:48) (102 - 114)  BP: 104/74 (09 Apr 2019 06:48) (104/74 - 118/89)  BP(mean): --  ABP: --  ABP(mean): --  RR: 18 (09 Apr 2019 06:48) (18 - 18)  SpO2: 97% (09 Apr 2019 06:48) (95% - 98%)    HOSPITAL MEDICATIONS:  MEDICATIONS  (STANDING):  ALBUTerol/ipratropium for Nebulization 3 milliLiter(s) Nebulizer every 6 hours  aspirin enteric coated 81 milliGRAM(s) Oral daily  chlorhexidine 4% Liquid 1 Application(s) Topical <User Schedule>  ciprofloxacin   IVPB      ciprofloxacin   IVPB 400 milliGRAM(s) IV Intermittent every 12 hours  influenza   Vaccine 0.5 milliLiter(s) IntraMuscular once  losartan 25 milliGRAM(s) Oral daily  metoprolol succinate ER 25 milliGRAM(s) Oral daily  predniSONE   Tablet 5 milliGRAM(s) Oral daily  sodium chloride 3%  Inhalation 3 milliLiter(s) Inhalation every 6 hours  spironolactone 50 milliGRAM(s) Oral daily  vancomycin  IVPB 1000 milliGRAM(s) IV Intermittent every 12 hours

## 2019-04-09 NOTE — PROGRESS NOTE ADULT - PROBLEM SELECTOR PROBLEM 7
Central serous retinopathy

## 2019-04-09 NOTE — PROGRESS NOTE ADULT - CONSTITUTIONAL DETAILS
no distress
well-nourished/well-groomed/well-developed

## 2019-04-09 NOTE — PROGRESS NOTE ADULT - GASTROINTESTINAL DETAILS
nontender/soft
nontender/soft/no distention
nontender/no distention/soft
nontender/soft
nontender/soft
no distention/nontender/soft
soft/normal/nontender

## 2019-04-09 NOTE — PROGRESS NOTE ADULT - PROBLEM SELECTOR PROBLEM 4
COPD (chronic obstructive pulmonary disease)
COPD (chronic obstructive pulmonary disease)
Essential hypertension
COPD (chronic obstructive pulmonary disease)

## 2019-04-09 NOTE — DISCHARGE NOTE NURSING/CASE MANAGEMENT/SOCIAL WORK - NSDCDPATPORTLINK_GEN_ALL_CORE
You can access the [facility] Patient Portal, offered by [facility], by registering with the following website: [email or web address]

## 2019-04-09 NOTE — PROGRESS NOTE ADULT - ATTENDING COMMENTS
doing better  afebrile, leukocytosis improving  less sputum  sputum cx with s. aureus and pseud - sens pending, I suspect resist  cont cici, vanco pending sens  will need 2 weeks of abx, likely picc
febrile to 102.6, and wbc increased. purulent tracheal secrtions.  zosyn to cici. cont vanco  check CT chest
Known to our service  As above  Increased purulent tracheal secretions, leukocytosis, sputum GS gram pos - rods/cocci, cx pending  vanco/zosyn  saline
acute on chronic resp failure  now on trach collar  he had sepsis with severe from PNA-pseudo/mrsa, on abx  hemoptysis controlled  COPD- cont meds spiriva/nebs  HTN-cont meds  may get deccanulated
much better   less sputum, can cough up from mouth at times  afebrile  mrsa and pseud sens to cipro  vanco/cipro for now.  PMV - doing well    Will plan for decann on Monday if stable.  monitor overnight after  when d/c - can go on po cipro and bactrim
Agree with above. Seen and examined with the fellow and NP. Agree with above plan.
Patient doing well.  Will complete 2 weeks of antibiotics total.  Discharge home today.  Follow up with pulmonary as outpatient.
Decannulated today.  Patient doing well.  Antibiotics to be completed tomorrow.   Discharge planning.

## 2019-04-09 NOTE — PROGRESS NOTE ADULT - CVS HE PE MLT D E PC
no murmur/regular rate and rhythm/no rub
no rub/regular rate and rhythm/no murmur
regular rate and rhythm/no rub/no murmur
no murmur/no rub/regular rate and rhythm
no murmur/regular rate and rhythm/no rub
regular rate and rhythm
regular rate and rhythm

## 2019-04-09 NOTE — PROGRESS NOTE ADULT - PROBLEM SELECTOR PROBLEM 2
Hemoptysis
Hemoptysis
Paroxysmal atrial fibrillation
Hemoptysis

## 2019-04-09 NOTE — PROGRESS NOTE ADULT - PROBLEM SELECTOR PLAN 7
- DW pt's outpatient ophtho  - cont aldactone 25mg   - increase to 50mg in am if tolerates
- DW pt's outpatient ophtho  - cont aldactone, increase to 50mg
- DW pt's outpatient ophtho  - cont aldactone 25mg   - increase to 50mg in am if tolerates
- DW pt's outpatient ophtho  - cont aldactone, increase to 50mg
- DW pt's outpatient ophtho  - cont aldactone, increased to 50mg, pt tolerating
- DW pt's outpatient ophtho  - cont aldactone, increased to 50mg, pt tolerating
- DW pt's outpatient ophtho  - to start aldactone 25mg QD  - increase to 50mg in a couple days if tolerates

## 2019-04-09 NOTE — PROGRESS NOTE ADULT - PROBLEM SELECTOR PLAN 8
- SCDs for now 2/2 hemoptysis

## 2019-04-09 NOTE — PROGRESS NOTE ADULT - PROBLEM SELECTOR PROBLEM 5
COPD (chronic obstructive pulmonary disease)
Paroxysmal atrial fibrillation

## 2019-04-09 NOTE — PROGRESS NOTE ADULT - MS EXT PE MLT D E PC
no cyanosis/no clubbing/no pedal edema
no cyanosis/no pedal edema/no clubbing
no pedal edema/no clubbing/no cyanosis
no clubbing/no pedal edema/no cyanosis
no cyanosis/no clubbing/no pedal edema
no clubbing/no cyanosis
normal

## 2019-04-09 NOTE — PROGRESS NOTE ADULT - RS GEN PE MLT RESP DETAILS PC
Please call, how long has she had sx? Any fever? Nausea or vomiting ?   
good air movement/airway patent/breath sounds equal
airway patent/breath sounds equal/good air movement
good air movement/airway patent/breath sounds equal
breath sounds equal/rhonchi/airway patent
airway patent/breath sounds equal/rhonchi
airway patent/breath sounds equal/good air movement
airway patent/normal/breath sounds equal

## 2019-04-09 NOTE — PROGRESS NOTE ADULT - NEUROLOGICAL DETAILS
alert and oriented x 3/responds to verbal commands/responds to pain
alert and oriented x 3/responds to pain/responds to verbal commands
alert and oriented x 3/responds to verbal commands/responds to pain
responds to verbal commands/alert and oriented x 3/responds to pain
responds to verbal commands/alert and oriented x 3/responds to pain
alert and oriented x 3

## 2019-04-09 NOTE — PROGRESS NOTE ADULT - NECK DETAILS
tracheostomy present
trach stoma present
tracheostomy present
supple/+ trach with collar
normal/supple

## 2019-04-09 NOTE — PROGRESS NOTE ADULT - PROBLEM SELECTOR PLAN 6
- cont losartan  - BP stable

## 2019-04-11 ENCOUNTER — APPOINTMENT (OUTPATIENT)
Dept: PULMONOLOGY | Facility: CLINIC | Age: 49
End: 2019-04-11
Payer: MEDICAID

## 2019-04-11 ENCOUNTER — APPOINTMENT (OUTPATIENT)
Dept: PULMONOLOGY | Facility: CLINIC | Age: 49
End: 2019-04-11

## 2019-04-11 VITALS
HEIGHT: 71 IN | BODY MASS INDEX: 30.52 KG/M2 | HEART RATE: 106 BPM | TEMPERATURE: 97.5 F | SYSTOLIC BLOOD PRESSURE: 118 MMHG | WEIGHT: 218 LBS | RESPIRATION RATE: 20 BRPM | DIASTOLIC BLOOD PRESSURE: 83 MMHG

## 2019-04-11 DIAGNOSIS — Z93.0 TRACHEOSTOMY STATUS: ICD-10-CM

## 2019-04-11 DIAGNOSIS — Z98.890 OTHER SPECIFIED POSTPROCEDURAL STATES: ICD-10-CM

## 2019-04-11 DIAGNOSIS — D86.9 SARCOIDOSIS, UNSPECIFIED: ICD-10-CM

## 2019-04-11 DIAGNOSIS — J44.9 CHRONIC OBSTRUCTIVE PULMONARY DISEASE, UNSPECIFIED: ICD-10-CM

## 2019-04-11 PROCEDURE — 99214 OFFICE O/P EST MOD 30 MIN: CPT

## 2019-04-11 RX ORDER — PREDNISONE 5 MG/1
5 TABLET ORAL DAILY
Qty: 30 | Refills: 3 | Status: ACTIVE | COMMUNITY
Start: 2019-03-01 | End: 1900-01-01

## 2019-04-11 NOTE — ASSESSMENT
[FreeTextEntry1] : Patient has severe obstructive lung disease. Will reevaluate for lung transplant at [facility], however patient was evaluated at [place] in the past and deemed not a candidate for lung transplant 2/2 cardiac issues.\par \par Return in 2 weeks for f/u on decannulation and ongoing discussion on lung transplant. Dressing supplies provided. Will reach out to [facility] to have patient evaluated.

## 2019-04-11 NOTE — REVIEW OF SYSTEMS
[Sputum] : sputum  [As Noted in HPI] : as noted in HPI [Negative] : Endocrine [FreeTextEntry6] : left sided weakness [de-identified] : s/p CVA with residuals

## 2019-04-11 NOTE — END OF VISIT
[FreeTextEntry3] : I directly supervised nurse practitioner Judd Enriquez and was present during key points of his history and physical. I agree with his history, physical and assessment.\par

## 2019-04-11 NOTE — HISTORY OF PRESENT ILLNESS
[FreeTextEntry1] : The patient has sarcoidosis, aspergillosis, asthma/COPD, PFO s/p CVA with residual left hemiplegia required emergent left upper lobectomy and IR embolization of left bronchial artery secondary to hemoptysis in 11/2017. 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  He reports that he has had repeated infections secondary to the tracheostomy and wants to be evaluated to have tracheostomy removed. He reports roughly 10 respiratory infections since trach was placed and most recently, he was discharged from a hospital in [place] on Tuesday on azithromycin/medrol. He currently sees Dr. Mixon, a pulmonologist in [place], but he is unhappy and seeking to establish care at our office.\par \par Trach is a 6 cuffless Shiley. He reports suctioning one to two times daily, with thin clear secretions when he is not sick. He and his wife are convinced that trach is cause of infections, although they were trained in sterile technique and state that they are compliant with it. He is currently taking Symbicort and Incruse PO along with albuterol via nebulizer three times daily. He states that he also takes the nebulized solution PO. He also takes a baseline of 10mg prednisone, which he states that he is supposed to decrease to 5mg after finishing medrol pack. He is on 1LNC at rest, 2LNC with activity at home but occasionally goes off O2 altogether when he is at rest.\par \par PFT today demonstrates severe obstruction with positive bronchodilator response, air trapping, and reduced DLCO.\par \par He was worked up by a cardiologist and has some LVD, but does not follow with the cardiologist. He is currently on metoprolol and losartan.\par \par 4/11 - Patient was decannulated 4/8 after hospitalization for PNA. Treated inpatient with antibiotics. Has been doing well since discharged Tuesday. Baseline SOB, denies fevers, chills, pleuritic pain, chest tightness, and wheezing. His cannulation site is still open, being dressed with gauze and tegaderm 1-2 times daily. 2LNC during activity, 1LNC at rest. He is able to ambulate despite CVA residuals and can walk distances, but notes SOB after he stops to rest. He currently takes Symbicort 160mcg and Prednisone 5mg daily.

## 2019-04-11 NOTE — PHYSICAL EXAM
[General Appearance - In No Acute Distress] : no acute distress [Heart Rate And Rhythm] : heart rate and rhythm were normal [Exaggerated Use Of Accessory Muscles For Inspiration] : no accessory muscle use [Abdomen Soft] : soft [Abdomen Tenderness] : non-tender [] : no hepato-splenomegaly [Abdomen Mass (___ Cm)] : no abdominal mass palpated [No Focal Deficits] : no focal deficits [Oriented To Time, Place, And Person] : oriented to person, place, and time [Affect] : the affect was normal [Mood] : the mood was normal [Skin Color & Pigmentation] : normal skin color and pigmentation [FreeTextEntry1] : coarse breath sounds bilaterally that improve with coughing [Redness] : no redness

## 2019-04-15 PROBLEM — J44.9 COPD, SEVERE: Status: ACTIVE | Noted: 2019-04-15

## 2019-04-23 ENCOUNTER — APPOINTMENT (OUTPATIENT)
Dept: PULMONOLOGY | Facility: CLINIC | Age: 49
End: 2019-04-23

## 2019-04-25 NOTE — PATIENT PROFILE ADULT - NSPROSPHOSPCHAPLAINYN_GEN_A_NUR
no Muscle Hinge Flap Text: The defect edges were debeveled with a #15 scalpel blade.  Given the size, depth and location of the defect and the proximity to free margins a muscle hinge flap was deemed most appropriate.  Using a sterile surgical marker, an appropriate hinge flap was drawn incorporating the defect. The area thus outlined was incised with a #15 scalpel blade.  The skin margins were undermined to an appropriate distance in all directions utilizing iris scissors.

## 2019-04-27 ENCOUNTER — INPATIENT (INPATIENT)
Facility: HOSPITAL | Age: 49
LOS: 5 days | Discharge: HOME | End: 2019-05-03
Attending: INTERNAL MEDICINE | Admitting: INTERNAL MEDICINE
Payer: MEDICAID

## 2019-04-27 VITALS
DIASTOLIC BLOOD PRESSURE: 72 MMHG | TEMPERATURE: 98 F | HEART RATE: 103 BPM | RESPIRATION RATE: 22 BRPM | SYSTOLIC BLOOD PRESSURE: 100 MMHG | OXYGEN SATURATION: 98 %

## 2019-04-27 DIAGNOSIS — Z29.9 ENCOUNTER FOR PROPHYLACTIC MEASURES, UNSPECIFIED: ICD-10-CM

## 2019-04-27 DIAGNOSIS — R04.2 HEMOPTYSIS: ICD-10-CM

## 2019-04-27 DIAGNOSIS — Z98.890 OTHER SPECIFIED POSTPROCEDURAL STATES: Chronic | ICD-10-CM

## 2019-04-27 DIAGNOSIS — I10 ESSENTIAL (PRIMARY) HYPERTENSION: ICD-10-CM

## 2019-04-27 DIAGNOSIS — J44.9 CHRONIC OBSTRUCTIVE PULMONARY DISEASE, UNSPECIFIED: ICD-10-CM

## 2019-04-27 DIAGNOSIS — N17.9 ACUTE KIDNEY FAILURE, UNSPECIFIED: ICD-10-CM

## 2019-04-27 DIAGNOSIS — D86.9 SARCOIDOSIS, UNSPECIFIED: ICD-10-CM

## 2019-04-27 LAB
ALBUMIN SERPL ELPH-MCNC: 3.8 G/DL — SIGNIFICANT CHANGE UP (ref 3.3–5)
ALP SERPL-CCNC: 61 U/L — SIGNIFICANT CHANGE UP (ref 40–120)
ALT FLD-CCNC: 12 U/L — SIGNIFICANT CHANGE UP (ref 4–41)
ANION GAP SERPL CALC-SCNC: 15 MMO/L — HIGH (ref 7–14)
APTT BLD: 27.3 SEC — LOW (ref 27.5–36.3)
AST SERPL-CCNC: 17 U/L — SIGNIFICANT CHANGE UP (ref 4–40)
B PERT DNA SPEC QL NAA+PROBE: NOT DETECTED — SIGNIFICANT CHANGE UP
BASE EXCESS BLDV CALC-SCNC: 5.6 MMOL/L — SIGNIFICANT CHANGE UP
BASOPHILS # BLD AUTO: 0.03 K/UL — SIGNIFICANT CHANGE UP (ref 0–0.2)
BASOPHILS NFR BLD AUTO: 0.2 % — SIGNIFICANT CHANGE UP (ref 0–2)
BILIRUB SERPL-MCNC: 0.3 MG/DL — SIGNIFICANT CHANGE UP (ref 0.2–1.2)
BLD GP AB SCN SERPL QL: NEGATIVE — SIGNIFICANT CHANGE UP
BLOOD GAS VENOUS - CREATININE: 1.22 MG/DL — SIGNIFICANT CHANGE UP (ref 0.5–1.3)
BUN SERPL-MCNC: 23 MG/DL — SIGNIFICANT CHANGE UP (ref 7–23)
C PNEUM DNA SPEC QL NAA+PROBE: NOT DETECTED — SIGNIFICANT CHANGE UP
CALCIUM SERPL-MCNC: 9.9 MG/DL — SIGNIFICANT CHANGE UP (ref 8.4–10.5)
CHLORIDE BLDV-SCNC: 102 MMOL/L — SIGNIFICANT CHANGE UP (ref 96–108)
CHLORIDE SERPL-SCNC: 96 MMOL/L — LOW (ref 98–107)
CO2 SERPL-SCNC: 28 MMOL/L — SIGNIFICANT CHANGE UP (ref 22–31)
CREAT SERPL-MCNC: 1.38 MG/DL — HIGH (ref 0.5–1.3)
EOSINOPHIL # BLD AUTO: 0 K/UL — SIGNIFICANT CHANGE UP (ref 0–0.5)
EOSINOPHIL NFR BLD AUTO: 0 % — SIGNIFICANT CHANGE UP (ref 0–6)
FLUAV H1 2009 PAND RNA SPEC QL NAA+PROBE: NOT DETECTED — SIGNIFICANT CHANGE UP
FLUAV H1 RNA SPEC QL NAA+PROBE: NOT DETECTED — SIGNIFICANT CHANGE UP
FLUAV H3 RNA SPEC QL NAA+PROBE: NOT DETECTED — SIGNIFICANT CHANGE UP
FLUAV SUBTYP SPEC NAA+PROBE: NOT DETECTED — SIGNIFICANT CHANGE UP
FLUBV RNA SPEC QL NAA+PROBE: NOT DETECTED — SIGNIFICANT CHANGE UP
GAS PNL BLDV: 136 MMOL/L — SIGNIFICANT CHANGE UP (ref 136–146)
GLUCOSE BLDV-MCNC: 112 — HIGH (ref 70–99)
GLUCOSE SERPL-MCNC: 122 MG/DL — HIGH (ref 70–99)
HADV DNA SPEC QL NAA+PROBE: NOT DETECTED — SIGNIFICANT CHANGE UP
HCO3 BLDV-SCNC: 28 MMOL/L — HIGH (ref 20–27)
HCOV PNL SPEC NAA+PROBE: SIGNIFICANT CHANGE UP
HCT VFR BLD CALC: 36.3 % — LOW (ref 39–50)
HCT VFR BLDV CALC: 35.1 % — LOW (ref 39–51)
HGB BLD-MCNC: 11.1 G/DL — LOW (ref 13–17)
HGB BLDV-MCNC: 11.4 G/DL — LOW (ref 13–17)
HMPV RNA SPEC QL NAA+PROBE: NOT DETECTED — SIGNIFICANT CHANGE UP
HPIV1 RNA SPEC QL NAA+PROBE: NOT DETECTED — SIGNIFICANT CHANGE UP
HPIV2 RNA SPEC QL NAA+PROBE: NOT DETECTED — SIGNIFICANT CHANGE UP
HPIV3 RNA SPEC QL NAA+PROBE: NOT DETECTED — SIGNIFICANT CHANGE UP
HPIV4 RNA SPEC QL NAA+PROBE: NOT DETECTED — SIGNIFICANT CHANGE UP
IMM GRANULOCYTES NFR BLD AUTO: 0.7 % — SIGNIFICANT CHANGE UP (ref 0–1.5)
INR BLD: 1.21 — HIGH (ref 0.88–1.17)
LACTATE BLDV-MCNC: 1.8 MMOL/L — SIGNIFICANT CHANGE UP (ref 0.5–2)
LYMPHOCYTES # BLD AUTO: 1.19 K/UL — SIGNIFICANT CHANGE UP (ref 1–3.3)
LYMPHOCYTES # BLD AUTO: 6.3 % — LOW (ref 13–44)
MCHC RBC-ENTMCNC: 27.5 PG — SIGNIFICANT CHANGE UP (ref 27–34)
MCHC RBC-ENTMCNC: 30.6 % — LOW (ref 32–36)
MCV RBC AUTO: 90.1 FL — SIGNIFICANT CHANGE UP (ref 80–100)
MONOCYTES # BLD AUTO: 1.83 K/UL — HIGH (ref 0–0.9)
MONOCYTES NFR BLD AUTO: 9.7 % — SIGNIFICANT CHANGE UP (ref 2–14)
NEUTROPHILS # BLD AUTO: 15.65 K/UL — HIGH (ref 1.8–7.4)
NEUTROPHILS NFR BLD AUTO: 83.1 % — HIGH (ref 43–77)
NRBC # FLD: 0 K/UL — SIGNIFICANT CHANGE UP (ref 0–0)
PCO2 BLDV: 65 MMHG — HIGH (ref 41–51)
PH BLDV: 7.31 PH — LOW (ref 7.32–7.43)
PLATELET # BLD AUTO: 230 K/UL — SIGNIFICANT CHANGE UP (ref 150–400)
PMV BLD: 12.6 FL — SIGNIFICANT CHANGE UP (ref 7–13)
PO2 BLDV: 37 MMHG — SIGNIFICANT CHANGE UP (ref 35–40)
POTASSIUM BLDV-SCNC: 5.3 MMOL/L — HIGH (ref 3.4–4.5)
POTASSIUM SERPL-MCNC: 5.1 MMOL/L — SIGNIFICANT CHANGE UP (ref 3.5–5.3)
POTASSIUM SERPL-SCNC: 5.1 MMOL/L — SIGNIFICANT CHANGE UP (ref 3.5–5.3)
PROT SERPL-MCNC: 8 G/DL — SIGNIFICANT CHANGE UP (ref 6–8.3)
PROTHROM AB SERPL-ACNC: 13.5 SEC — HIGH (ref 9.8–13.1)
RBC # BLD: 4.03 M/UL — LOW (ref 4.2–5.8)
RBC # FLD: 14.5 % — SIGNIFICANT CHANGE UP (ref 10.3–14.5)
RH IG SCN BLD-IMP: POSITIVE — SIGNIFICANT CHANGE UP
RSV RNA SPEC QL NAA+PROBE: NOT DETECTED — SIGNIFICANT CHANGE UP
RV+EV RNA SPEC QL NAA+PROBE: NOT DETECTED — SIGNIFICANT CHANGE UP
SAO2 % BLDV: 62.4 % — SIGNIFICANT CHANGE UP (ref 60–85)
SODIUM SERPL-SCNC: 139 MMOL/L — SIGNIFICANT CHANGE UP (ref 135–145)
WBC # BLD: 18.83 K/UL — HIGH (ref 3.8–10.5)
WBC # FLD AUTO: 18.83 K/UL — HIGH (ref 3.8–10.5)

## 2019-04-27 PROCEDURE — 71045 X-RAY EXAM CHEST 1 VIEW: CPT | Mod: 26

## 2019-04-27 RX ORDER — IPRATROPIUM/ALBUTEROL SULFATE 18-103MCG
3 AEROSOL WITH ADAPTER (GRAM) INHALATION EVERY 6 HOURS
Qty: 0 | Refills: 0 | Status: DISCONTINUED | OUTPATIENT
Start: 2019-04-27 | End: 2019-05-03

## 2019-04-27 RX ORDER — VANCOMYCIN HCL 1 G
1000 VIAL (EA) INTRAVENOUS ONCE
Qty: 0 | Refills: 0 | Status: COMPLETED | OUTPATIENT
Start: 2019-04-27 | End: 2019-04-27

## 2019-04-27 RX ORDER — SODIUM CHLORIDE 9 MG/ML
1000 INJECTION INTRAMUSCULAR; INTRAVENOUS; SUBCUTANEOUS ONCE
Qty: 0 | Refills: 0 | Status: COMPLETED | OUTPATIENT
Start: 2019-04-27 | End: 2019-04-27

## 2019-04-27 RX ORDER — IPRATROPIUM/ALBUTEROL SULFATE 18-103MCG
3 AEROSOL WITH ADAPTER (GRAM) INHALATION ONCE
Qty: 0 | Refills: 0 | Status: COMPLETED | OUTPATIENT
Start: 2019-04-27 | End: 2019-04-27

## 2019-04-27 RX ORDER — PIPERACILLIN AND TAZOBACTAM 4; .5 G/20ML; G/20ML
3.38 INJECTION, POWDER, LYOPHILIZED, FOR SOLUTION INTRAVENOUS EVERY 12 HOURS
Qty: 0 | Refills: 0 | Status: DISCONTINUED | OUTPATIENT
Start: 2019-04-27 | End: 2019-04-30

## 2019-04-27 RX ORDER — SODIUM CHLORIDE 9 MG/ML
1000 INJECTION INTRAMUSCULAR; INTRAVENOUS; SUBCUTANEOUS
Qty: 0 | Refills: 0 | Status: DISCONTINUED | OUTPATIENT
Start: 2019-04-27 | End: 2019-04-28

## 2019-04-27 RX ORDER — METOPROLOL TARTRATE 50 MG
25 TABLET ORAL DAILY
Qty: 0 | Refills: 0 | Status: DISCONTINUED | OUTPATIENT
Start: 2019-04-27 | End: 2019-05-03

## 2019-04-27 RX ORDER — ALBUTEROL 90 UG/1
2 AEROSOL, METERED ORAL
Qty: 0 | Refills: 0 | COMMUNITY

## 2019-04-27 RX ORDER — METOPROLOL TARTRATE 50 MG
1 TABLET ORAL
Qty: 0 | Refills: 0 | COMMUNITY

## 2019-04-27 RX ORDER — UMECLIDINIUM 62.5 UG/1
1 AEROSOL, POWDER ORAL
Qty: 0 | Refills: 0 | COMMUNITY

## 2019-04-27 RX ORDER — PIPERACILLIN AND TAZOBACTAM 4; .5 G/20ML; G/20ML
3.38 INJECTION, POWDER, LYOPHILIZED, FOR SOLUTION INTRAVENOUS ONCE
Qty: 0 | Refills: 0 | Status: COMPLETED | OUTPATIENT
Start: 2019-04-27 | End: 2019-04-27

## 2019-04-27 RX ORDER — ASPIRIN/CALCIUM CARB/MAGNESIUM 324 MG
1 TABLET ORAL
Qty: 0 | Refills: 0 | COMMUNITY

## 2019-04-27 RX ADMIN — PIPERACILLIN AND TAZOBACTAM 200 GRAM(S): 4; .5 INJECTION, POWDER, LYOPHILIZED, FOR SOLUTION INTRAVENOUS at 17:04

## 2019-04-27 RX ADMIN — Medication 3 MILLILITER(S): at 21:42

## 2019-04-27 RX ADMIN — SODIUM CHLORIDE 125 MILLILITER(S): 9 INJECTION INTRAMUSCULAR; INTRAVENOUS; SUBCUTANEOUS at 23:00

## 2019-04-27 RX ADMIN — Medication 100 MILLIGRAM(S): at 22:48

## 2019-04-27 RX ADMIN — PIPERACILLIN AND TAZOBACTAM 3.38 GRAM(S): 4; .5 INJECTION, POWDER, LYOPHILIZED, FOR SOLUTION INTRAVENOUS at 17:35

## 2019-04-27 RX ADMIN — SODIUM CHLORIDE 1000 MILLILITER(S): 9 INJECTION INTRAMUSCULAR; INTRAVENOUS; SUBCUTANEOUS at 17:00

## 2019-04-27 RX ADMIN — Medication 1000 MILLIGRAM(S): at 18:37

## 2019-04-27 RX ADMIN — Medication 250 MILLIGRAM(S): at 17:40

## 2019-04-27 RX ADMIN — SODIUM CHLORIDE 1000 MILLILITER(S): 9 INJECTION INTRAMUSCULAR; INTRAVENOUS; SUBCUTANEOUS at 16:03

## 2019-04-27 NOTE — H&P ADULT - PROBLEM SELECTOR PLAN 2
- COPD exacerbation leading to increased coughing and hemoptysis?  - - COPD exacerbation leading to increased coughing and hemoptysis?  - Duoneb q6h.   - Will discuss with pulm regarding further steroid treatment.

## 2019-04-27 NOTE — ED PROVIDER NOTE - ATTENDING CONTRIBUTION TO CARE
[place]ur[place]  48 male h/o sarcoid recent trach removal  c/o bloody phlegm form ostomy for the last 3 days  with associated fever  no c/o increasing SOB  nog GI sxs    exam  pt in no visible distress  soft upper airway sounds b/l    trach site with small amt blood mixed with phlegm on bandage  abd nontender  card RRR S1S2    CXR large bleb DAVID  haziness below bleb  increased markings rt lung   all similar to prior CXR [place]  48 male h/o sarcoid recent trach removal  c/o bloody phlegm form ostomy for the last 3 days  with associated fever  no c/o increasing SOB  nog GI sxs    exam  pt in no visible distress  soft upper airway sounds b/l    trach site with small amt blood mixed with phlegm on bandage  abd nontender  card RRR S1S2    CXR large bleb DAVID  haziness below bleb  increased markings rt lung   all similar to prior CXR  but with sxs  antibiotics started [place]  48 male h/o sarcoid recent trach removal  c/o bloody phlegm form ostomy for the last 3 days  with associated fever  no c/o increasing SOB  no GI sxs    exam  pt in no visible distress  soft upper airway sounds b/l    trach site with small amt blood mixed with phlegm on bandage  abd nontender  card RRR S1S2    CXR large bleb DAVID  haziness below bleb  increased markings rt lung   all similar to prior CXR  but with sxs  antibiotics started

## 2019-04-27 NOTE — ED ADULT NURSE REASSESSMENT NOTE - NS ED NURSE REASSESS COMMENT FT1
Pt is calm and cooperative. Girlfriend at bedside. Lungs are bilaterally clear, abdomen is soft and nondistended, no peripheral edema noted, pulses present. Some bright red  blood discharge from trach stoma site, no swelling or redness noted.

## 2019-04-27 NOTE — H&P ADULT - NSHPPHYSICALEXAM_GEN_ALL_CORE
T(C): 36.5 (04-27-19 @ 19:29), Max: 36.8 (04-27-19 @ 15:16)  HR: 107 (04-27-19 @ 19:29) (100 - 107)  BP: 119/73 (04-27-19 @ 19:29) (100/72 - 125/92)  RR: 19 (04-27-19 @ 19:29) (19 - 22)  SpO2: 99% (04-27-19 @ 19:29) (98% - 100%)  Wt(kg): --  GENERAL: NAD, well-developed  HEAD:  Atraumatic, Normocephalic  EYES: EOMI, PERRLA, conjunctiva and sclera clear  NECK: Supple, No JVD  CHEST/LUNG: Clear to auscultation bilaterally; No wheezing audible at the time of this exam  HEART: Regular rate and rhythm; No murmurs, rubs, or gallops  ABDOMEN: Soft, Nontender, Nondistended; Bowel sounds present  EXTREMITIES:  2+ Peripheral Pulses, No clubbing, cyanosis, or edema  PSYCH: AAOx3  NEUROLOGY: non-focal  SKIN: No rashes or lesions

## 2019-04-27 NOTE — ED PROVIDER NOTE - PROGRESS NOTE DETAILS
Spoke with pulm fellow. Has bed and will save for patient, pending RVp. Agree with gerard and zosyn given last cultures. RVP neg. pulm aware and accepted to rcu

## 2019-04-27 NOTE — H&P ADULT - ASSESSMENT
48 M PMH sarcoidosis c/b aspergilosis (s/p voriconazole course 11/17), HTN, asthma, COPD, PFO, paroxysmal Afib (not on a/c 2/2 recurrent hemoptysis), CVA w/ L hemiplegia (9/17), persistent hemoptysis 2/2 DAVID bleed s/p thoracotomy with DAVID lung lobectomy on 9/24/17 s/p IR embolization of Left bronchial artery on 11/16/17 complicated by pneumothorax requiring chest tube, s/p tracheostomy 11/17 for acute hypoxic respiratory failure s/p decannuation and broncho-pulmonary fistula p/w recurrent hemoptysis today.

## 2019-04-27 NOTE — ED ADULT NURSE REASSESSMENT NOTE - NS ED NURSE REASSESS COMMENT FT1
report received from CHARLIE Durant, pt A&Ox4, on 2lpm via NC as baseline O2, denies chest pain. pt appears to have dyspnea on exertion. girlfriend at bedside caring for patient. comfort measures provided, awaiting bed assignment. will ctm.

## 2019-04-27 NOTE — ED ADULT TRIAGE NOTE - CHIEF COMPLAINT QUOTE
Pt c/o cough x 3 days, fever today 101.4F at home. Pt has tracheal stoma, trach was removed 3 weeks ago. Also started having bleeding to stoma apprx 3 days ago. Hx: Sarcoidosis, COPD, asthma, HTN. Pt O2 dependent 2L at home. Pt has dressing to stoma, some bleeding observed on dressing.

## 2019-04-27 NOTE — H&P ADULT - PROBLEM SELECTOR PLAN 5
- BP currently stable. Given risk of bleeding and JERRI, hold losartan and spironolactone.  - C/w home dose Toprol holding parameters.

## 2019-04-27 NOTE — ED PROVIDER NOTE - OBJECTIVE STATEMENT
48 M h/o htn, sarcoidosis, asthma, aspergillosis, recent admission in early April for hemoptysis (hemoptysis has been chronic issue, has had lobectomy; in prior admission had trach, was decannulated during last admission), with hemoptysis. Patient states was improved after discharge, but over last week, persistent hemoptysis from trach stoma and slight dizziness. Also had T100.4 this AM, took tylenol.  Slight increase in sputum production. Went to another ER yesterday and discharged, but worsening hemoptysis.  Not currently on abx. During last admission with sputum MRSA and pseudomonas.

## 2019-04-27 NOTE — H&P ADULT - NSHPLABSRESULTS_GEN_ALL_CORE
(04-27 @ 16:05)                      11.1  18.83 )-----------( 230                 36.3    Neutrophils = 15.65 (83.1%)  Lymphocytes = 1.19 (6.3%)  Eosinophils = 0.00 (0.0%)  Basophils = 0.03 (0.2%)  Monocytes = 1.83 (9.7%)  Bands = --%    04-27    139  |  96<L>  |  23  ----------------------------<  122<H>  5.1   |  28  |  1.38<H>    Ca    9.9      27 Apr 2019 16:05    TPro  8.0  /  Alb  3.8  /  TBili  0.3  /  DBili  x   /  AST  17  /  ALT  12  /  AlkPhos  61  04-27    ( 27 Apr 2019 16:05 )   PT: 13.5 SEC;   INR: 1.21 ;       PTT:27.3 SEC      RVP: negative    Venous Blood Gas:  04-27 @ 16:05  7.31/65/37/28/62.4  VBG Lactate: 1.8

## 2019-04-27 NOTE — H&P ADULT - HISTORY OF PRESENT ILLNESS
48 M PMH sarcoidosis c/b aspergilosis (s/p voriconazole course 11/17), HTN, asthma, COPD, PFO, paroxysmal Afib (not on a/c 2/2 recurrent hemoptysis), CVA w/ L hemiplegia (9/17), persistent hemoptysis 2/2 DAVID bleed s/p thoracotomy with DAVID lung lobectomy on 9/24/17 s/p IR embolization of Left bronchial artery on 11/16/17 complicated by pneumothorax requiring chest tube, s/p tracheostomy 11/17 for acute hypoxic respiratory failure s/p decannuation and broncho-pulmonary fistula p/w recurrent hemoptysis today. Patient reported doing better after previous admission in early Jan. Finished 40mg daily steroids yesterday. Abx po dced after 4/15. Patient noticed increasing coughing in the past few days along with copious mucus from previous trach site. Patient also noticed increasing hemoptysis along with his cough. Patient noticed dizziness today and temperature was 100.4 at home.     In the ED, patient was noted VSSAF. Patient was given 2L NC and sating well without distress. Vanc and zosyn started after BCx sent.

## 2019-04-27 NOTE — ED PROVIDER NOTE - CLINICAL SUMMARY MEDICAL DECISION MAKING FREE TEXT BOX
48 M, recent [facility] admission for hemoptysis and trach was decanulated, p/w hemoptysis and fever at home. Slightly tachycardic. ?infectious vs anemia.  No resp distress. Check labs, cxr, antibiotics d.w pulm

## 2019-04-27 NOTE — ED ADULT NURSE REASSESSMENT NOTE - NS ED NURSE REASSESS COMMENT FT1
report given to ALAINA Stewart notified of pt request for neb treatment, awaiting further orders and transport.

## 2019-04-27 NOTE — H&P ADULT - NSHPREVIEWOFSYSTEMS_GEN_ALL_CORE
CONSTITUTIONAL: Fever, weakness  EYES/ENT: No visual changes; No vertigo or throat pain   NECK: trach site with mucus and blood with coughing  RESPIRATORY: Cough, hemoptysis  CARDIOVASCULAR: No chest pain or palpitations  GASTROINTESTINAL: No abdominal or epigastric pain. No nausea, vomiting, or hematemesis; No diarrhea or constipation. No melena or hematochezia.  GENITOURINARY: No dysuria, frequency or hematuria  NEUROLOGICAL: No numbness or weakness  SKIN: No itching, burning, rashes, or lesions   All other review of systems is negative unless indicated above.

## 2019-04-27 NOTE — ED ADULT NURSE NOTE - OBJECTIVE STATEMENT
Pt received in #17, aaox3 with c/o bleeding from tracheal stoma. Pt states that he had his tracheostomy removed ~3 weeks ago. Pt noted blood on his dressing and was concerned. Of note, pt seen at an Emergency Department in [place] and "was told everything was okay". Pt is not in any respiratory distress, able to speak in full sentences by occluding his stoma site. Pt on cm with continuous pulse oximetry, iv established, labs sent and pt medicated as ordered.

## 2019-04-28 DIAGNOSIS — R13.10 DYSPHAGIA, UNSPECIFIED: ICD-10-CM

## 2019-04-28 LAB
ALBUMIN SERPL ELPH-MCNC: 3.4 G/DL — SIGNIFICANT CHANGE UP (ref 3.3–5)
ALP SERPL-CCNC: 52 U/L — SIGNIFICANT CHANGE UP (ref 40–120)
ALT FLD-CCNC: 9 U/L — SIGNIFICANT CHANGE UP (ref 4–41)
ANION GAP SERPL CALC-SCNC: 12 MMO/L — SIGNIFICANT CHANGE UP (ref 7–14)
AST SERPL-CCNC: 10 U/L — SIGNIFICANT CHANGE UP (ref 4–40)
BASOPHILS # BLD AUTO: 0.02 K/UL — SIGNIFICANT CHANGE UP (ref 0–0.2)
BASOPHILS NFR BLD AUTO: 0.2 % — SIGNIFICANT CHANGE UP (ref 0–2)
BILIRUB SERPL-MCNC: 0.3 MG/DL — SIGNIFICANT CHANGE UP (ref 0.2–1.2)
BUN SERPL-MCNC: 20 MG/DL — SIGNIFICANT CHANGE UP (ref 7–23)
CALCIUM SERPL-MCNC: 9.3 MG/DL — SIGNIFICANT CHANGE UP (ref 8.4–10.5)
CHLORIDE SERPL-SCNC: 98 MMOL/L — SIGNIFICANT CHANGE UP (ref 98–107)
CO2 SERPL-SCNC: 28 MMOL/L — SIGNIFICANT CHANGE UP (ref 22–31)
CREAT SERPL-MCNC: 1.23 MG/DL — SIGNIFICANT CHANGE UP (ref 0.5–1.3)
EOSINOPHIL # BLD AUTO: 0.04 K/UL — SIGNIFICANT CHANGE UP (ref 0–0.5)
EOSINOPHIL NFR BLD AUTO: 0.3 % — SIGNIFICANT CHANGE UP (ref 0–6)
GLUCOSE SERPL-MCNC: 94 MG/DL — SIGNIFICANT CHANGE UP (ref 70–99)
GRAM STN SPT: SIGNIFICANT CHANGE UP
HCT VFR BLD CALC: 34 % — LOW (ref 39–50)
HGB BLD-MCNC: 10.1 G/DL — LOW (ref 13–17)
IMM GRANULOCYTES NFR BLD AUTO: 0.7 % — SIGNIFICANT CHANGE UP (ref 0–1.5)
LYMPHOCYTES # BLD AUTO: 1.94 K/UL — SIGNIFICANT CHANGE UP (ref 1–3.3)
LYMPHOCYTES # BLD AUTO: 15.6 % — SIGNIFICANT CHANGE UP (ref 13–44)
MAGNESIUM SERPL-MCNC: 1.7 MG/DL — SIGNIFICANT CHANGE UP (ref 1.6–2.6)
MCHC RBC-ENTMCNC: 27.1 PG — SIGNIFICANT CHANGE UP (ref 27–34)
MCHC RBC-ENTMCNC: 29.7 % — LOW (ref 32–36)
MCV RBC AUTO: 91.2 FL — SIGNIFICANT CHANGE UP (ref 80–100)
MONOCYTES # BLD AUTO: 2.4 K/UL — HIGH (ref 0–0.9)
MONOCYTES NFR BLD AUTO: 19.4 % — HIGH (ref 2–14)
NEUTROPHILS # BLD AUTO: 7.91 K/UL — HIGH (ref 1.8–7.4)
NEUTROPHILS NFR BLD AUTO: 63.8 % — SIGNIFICANT CHANGE UP (ref 43–77)
NRBC # FLD: 0 K/UL — SIGNIFICANT CHANGE UP (ref 0–0)
PHOSPHATE SERPL-MCNC: 2.9 MG/DL — SIGNIFICANT CHANGE UP (ref 2.5–4.5)
PLATELET # BLD AUTO: 202 K/UL — SIGNIFICANT CHANGE UP (ref 150–400)
PMV BLD: 12.5 FL — SIGNIFICANT CHANGE UP (ref 7–13)
POTASSIUM SERPL-MCNC: 3.9 MMOL/L — SIGNIFICANT CHANGE UP (ref 3.5–5.3)
POTASSIUM SERPL-SCNC: 3.9 MMOL/L — SIGNIFICANT CHANGE UP (ref 3.5–5.3)
PROT SERPL-MCNC: 7.1 G/DL — SIGNIFICANT CHANGE UP (ref 6–8.3)
RBC # BLD: 3.73 M/UL — LOW (ref 4.2–5.8)
RBC # FLD: 14.5 % — SIGNIFICANT CHANGE UP (ref 10.3–14.5)
SODIUM SERPL-SCNC: 138 MMOL/L — SIGNIFICANT CHANGE UP (ref 135–145)
SPECIMEN SOURCE: SIGNIFICANT CHANGE UP
VANCOMYCIN FLD-MCNC: 4.9 UG/ML — SIGNIFICANT CHANGE UP
WBC # BLD: 12.4 K/UL — HIGH (ref 3.8–10.5)
WBC # FLD AUTO: 12.4 K/UL — HIGH (ref 3.8–10.5)

## 2019-04-28 PROCEDURE — 99223 1ST HOSP IP/OBS HIGH 75: CPT

## 2019-04-28 RX ORDER — VANCOMYCIN HCL 1 G
1250 VIAL (EA) INTRAVENOUS ONCE
Qty: 0 | Refills: 0 | Status: DISCONTINUED | OUTPATIENT
Start: 2019-04-28 | End: 2019-04-28

## 2019-04-28 RX ORDER — ACETAMINOPHEN 500 MG
650 TABLET ORAL EVERY 6 HOURS
Qty: 0 | Refills: 0 | Status: DISCONTINUED | OUTPATIENT
Start: 2019-04-28 | End: 2019-05-03

## 2019-04-28 RX ORDER — INFLUENZA VIRUS VACCINE 15; 15; 15; 15 UG/.5ML; UG/.5ML; UG/.5ML; UG/.5ML
0.5 SUSPENSION INTRAMUSCULAR ONCE
Qty: 0 | Refills: 0 | Status: COMPLETED | OUTPATIENT
Start: 2019-04-28 | End: 2019-04-28

## 2019-04-28 RX ORDER — ACETAMINOPHEN 500 MG
650 TABLET ORAL ONCE
Qty: 0 | Refills: 0 | Status: COMPLETED | OUTPATIENT
Start: 2019-04-28 | End: 2019-04-28

## 2019-04-28 RX ORDER — VANCOMYCIN HCL 1 G
1250 VIAL (EA) INTRAVENOUS EVERY 12 HOURS
Qty: 0 | Refills: 0 | Status: DISCONTINUED | OUTPATIENT
Start: 2019-04-28 | End: 2019-04-30

## 2019-04-28 RX ADMIN — Medication 650 MILLIGRAM(S): at 18:35

## 2019-04-28 RX ADMIN — Medication 3 MILLILITER(S): at 16:06

## 2019-04-28 RX ADMIN — Medication 3 MILLILITER(S): at 04:46

## 2019-04-28 RX ADMIN — Medication 650 MILLIGRAM(S): at 03:00

## 2019-04-28 RX ADMIN — Medication 650 MILLIGRAM(S): at 19:05

## 2019-04-28 RX ADMIN — Medication 3 MILLILITER(S): at 22:19

## 2019-04-28 RX ADMIN — Medication 5 MILLIGRAM(S): at 06:58

## 2019-04-28 RX ADMIN — PIPERACILLIN AND TAZOBACTAM 25 GRAM(S): 4; .5 INJECTION, POWDER, LYOPHILIZED, FOR SOLUTION INTRAVENOUS at 18:35

## 2019-04-28 RX ADMIN — Medication 650 MILLIGRAM(S): at 01:29

## 2019-04-28 RX ADMIN — PIPERACILLIN AND TAZOBACTAM 25 GRAM(S): 4; .5 INJECTION, POWDER, LYOPHILIZED, FOR SOLUTION INTRAVENOUS at 06:57

## 2019-04-28 RX ADMIN — Medication 166.67 MILLIGRAM(S): at 11:06

## 2019-04-28 RX ADMIN — Medication 3 MILLILITER(S): at 10:23

## 2019-04-28 RX ADMIN — Medication 25 MILLIGRAM(S): at 06:58

## 2019-04-28 NOTE — PROGRESS NOTE ADULT - SUBJECTIVE AND OBJECTIVE BOX
CHIEF COMPLAINT:    Interval Events:    REVIEW OF SYSTEMS:  Constitutional:   Eyes:  ENT:  CV:  Resp:  GI:  :  MSK:  Integumentary:  Neurological:  Psychiatric:  Endocrine:  Hematologic/Lymphatic:  Allergic/Immunologic:  [ ] All other systems negative  [ ] Unable to assess ROS because ________    OBJECTIVE:  ICU Vital Signs Last 24 Hrs  T(C): 37.1 (28 Apr 2019 06:56), Max: 38.5 (28 Apr 2019 01:11)  T(F): 98.7 (28 Apr 2019 06:56), Max: 101.3 (28 Apr 2019 01:11)  HR: 110 (28 Apr 2019 06:56) (100 - 125)  BP: 110/70 (28 Apr 2019 06:56) (100/72 - 126/87)  BP(mean): --  ABP: --  ABP(mean): --  RR: 18 (28 Apr 2019 06:56) (18 - 22)  SpO2: 100% (28 Apr 2019 06:56) (98% - 100%)        CAPILLARY BLOOD GLUCOSE          PHYSICAL EXAM:  General:   HEENT:   Lymph Nodes:  Neck:   Respiratory:   Cardiovascular:   Abdomen:   Extremities:   Skin:   Neurological:  Psychiatry:    HOSPITAL MEDICATIONS:  MEDICATIONS  (STANDING):  ALBUTerol/ipratropium for Nebulization 3 milliLiter(s) Nebulizer every 6 hours  influenza   Vaccine 0.5 milliLiter(s) IntraMuscular once  metoprolol succinate ER 25 milliGRAM(s) Oral daily  piperacillin/tazobactam IVPB. 3.375 Gram(s) IV Intermittent every 12 hours  predniSONE   Tablet 5 milliGRAM(s) Oral daily  sodium chloride 0.9%. 1000 milliLiter(s) (125 mL/Hr) IV Continuous <Continuous>  vancomycin  IVPB 1250 milliGRAM(s) IV Intermittent once    MEDICATIONS  (PRN):  guaiFENesin    Syrup 100 milliGRAM(s) Oral every 6 hours PRN Cough      LABS:                        10.1   12.40 )-----------( 202      ( 28 Apr 2019 06:30 )             34.0     04-28    138  |  98  |  20  ----------------------------<  94  3.9   |  28  |  1.23    Ca    9.3      28 Apr 2019 06:30  Phos  2.9     04-28  Mg     1.7     04-28    TPro  7.1  /  Alb  3.4  /  TBili  0.3  /  DBili  x   /  AST  10  /  ALT  9   /  AlkPhos  52  04-28    PT/INR - ( 27 Apr 2019 16:05 )   PT: 13.5 SEC;   INR: 1.21          PTT - ( 27 Apr 2019 16:05 )  PTT:27.3 SEC      Venous Blood Gas:  04-27 @ 16:05  7.31/65/37/28/62.4  VBG Lactate: 1.8      MICROBIOLOGY:     RADIOLOGY:  [ ] Reviewed and interpreted by me    PULMONARY FUNCTION TESTS:    EKG: CHIEF COMPLAINT: Patient is a 48y old  Male who presents with a chief complaint of hemoptysis (28 Apr 2019 09:13)    Interval Events: Patient notes congestion and tightness in chest. Continued on Duonebs and Prednisone 5mg PO QD as per daily dosing. Arobica added and NC PRN.     REVIEW OF SYSTEMS:  General/ Constitutional: No fever, chills  Cardiovascular: No chest pain or palpitations   Respiratory: Mild SOB/ MEYER and (+) productive cough. (+) Congestion. No wheezing   GI: No melena, hematochezia, N/V/D/C or abdominal pain   [ x] All other systems negative    OBJECTIVE:  ICU Vital Signs Last 24 Hrs  T(C): 37.1 (28 Apr 2019 06:56), Max: 38.5 (28 Apr 2019 01:11)  T(F): 98.7 (28 Apr 2019 06:56), Max: 101.3 (28 Apr 2019 01:11)  HR: 110 (28 Apr 2019 06:56) (100 - 125)  BP: 110/70 (28 Apr 2019 06:56) (100/72 - 126/87)  BP(mean): --  ABP: --  ABP(mean): --  RR: 18 (28 Apr 2019 06:56) (18 - 22)  SpO2: 100% (28 Apr 2019 06:56) (98% - 100%)    CAPILLARY BLOOD GLUCOSE    PHYSICAL EXAM:  General: NAD, well nourished, well developed.   Neck: (+) open tracheostomy stoma. Clean/Dry/Intact.   Cardio: RRR, S1/S2, no murmurs  Pulm: Coarse breath sounds throughout all lung fields.   GI: Nondistended, BS present in all four quadrants, soft, nontender, no rebound tenderness or guarding     HOSPITAL MEDICATIONS:  MEDICATIONS  (STANDING):  ALBUTerol/ipratropium for Nebulization 3 milliLiter(s) Nebulizer every 6 hours  influenza   Vaccine 0.5 milliLiter(s) IntraMuscular once  metoprolol succinate ER 25 milliGRAM(s) Oral daily  piperacillin/tazobactam IVPB. 3.375 Gram(s) IV Intermittent every 12 hours  predniSONE   Tablet 5 milliGRAM(s) Oral daily  sodium chloride 0.9%. 1000 milliLiter(s) (125 mL/Hr) IV Continuous <Continuous>  vancomycin  IVPB 1250 milliGRAM(s) IV Intermittent once    MEDICATIONS  (PRN):  guaiFENesin    Syrup 100 milliGRAM(s) Oral every 6 hours PRN Cough    LABS:                        10.1   12.40 )-----------( 202      ( 28 Apr 2019 06:30 )             34.0     04-28    138  |  98  |  20  ----------------------------<  94  3.9   |  28  |  1.23    Ca    9.3      28 Apr 2019 06:30  Phos  2.9     04-28  Mg     1.7     04-28    TPro  7.1  /  Alb  3.4  /  TBili  0.3  /  DBili  x   /  AST  10  /  ALT  9   /  AlkPhos  52  04-28    PT/INR - ( 27 Apr 2019 16:05 )   PT: 13.5 SEC;   INR: 1.21          PTT - ( 27 Apr 2019 16:05 )  PTT:27.3 SEC      Venous Blood Gas:  04-27 @ 16:05  7.31/65/37/28/62.4  VBG Lactate: 1.8    MICROBIOLOGY:     RADIOLOGY:  [ ] Reviewed and interpreted by me    PULMONARY FUNCTION TESTS:    EKG:

## 2019-04-28 NOTE — PROGRESS NOTE ADULT - PROBLEM SELECTOR PLAN 4
- c/w inhalers, prednisone 5 mg pO daily. - Patient with Dysphagia 1 with Honey Thickened Liquids in 2/2018.   - f/u Repeat SS

## 2019-04-28 NOTE — PROGRESS NOTE ADULT - PROBLEM SELECTOR PLAN 3
- Encourage po intake.  - Gentle NS 100cc/h for 1 day.  - Trend BMP and renal functions. - s/p IVF hydration. Monitor CRE and Vancomycin trough.   - Renal dose medications. Avoid nephrotoxic agents.

## 2019-04-28 NOTE — PROGRESS NOTE ADULT - PROBLEM SELECTOR PLAN 5
- BP currently stable. Given risk of bleeding and JERRI, hold losartan and spironolactone.  - C/w home dose Toprol holding parameters. - Continue on home inhalers and Prednisone 5mg PO QD as above

## 2019-04-28 NOTE — PROGRESS NOTE ADULT - PROBLEM SELECTOR PLAN 1
- C/w vanc and zosyn (vanc by level given JERRI)  - Hold AC  - F/u pulm recs in the am. - Patient with history of Hemoptysis 2/2 DAVID Bleed s/p Thoracotomy with DAVID Lung Lobectomy on 9/24/17 s/p IR emobolization of L Bronchial Artery 11/16/17 complicated by PTX requiring chest tube s/p Tracheostomy 11//17/17 for AHRF s/p Decannulation and Bronchopulmonary Fistula.   - COPD Exacerbation vs PNA likely causing Hemoptysis   - Duonebs Q6H. Prednisone home dose 5mg QD. c/w Zosyn and Vancomycin.   - Cough syrup. Arobica. c/w home inhalers   - Hold AC at this time and monitor HH.

## 2019-04-28 NOTE — PROGRESS NOTE ADULT - PROBLEM SELECTOR PLAN 2
- COPD exacerbation leading to increased coughing and hemoptysis?  - Duoneb q6h.   - Will discuss with pulm regarding further steroid treatment. - Continue on steroids and nebs as above.

## 2019-04-28 NOTE — PROGRESS NOTE ADULT - PROBLEM SELECTOR PLAN 6
- Hold chemo DVT ppx.  - SCDs ordered.  - DASH diet. - BP currently stable.   - Hold Losartan and Spironolactone for now given Hemoptysis.   - Continue home dose Toprol

## 2019-04-28 NOTE — PROGRESS NOTE ADULT - ASSESSMENT
48 M PMH sarcoidosis c/b aspergilosis (s/p voriconazole course 11/17), HTN, asthma, COPD, PFO, paroxysmal Afib (not on a/c 2/2 recurrent hemoptysis), CVA w/ L hemiplegia (9/17), persistent hemoptysis 2/2 DAVID bleed s/p thoracotomy with DAVID lung lobectomy on 9/24/17 s/p IR embolization of Left bronchial artery on 11/16/17 complicated by pneumothorax requiring chest tube, s/p tracheostomy 11/17 for acute hypoxic respiratory failure s/p decannuation and broncho-pulmonary fistula p/w recurrent hemoptysis today. Mr. Dickens is 49 YO M with PMH sarcoidosis c/b aspergillosis (s/p voriconazole course in 11/17), HTN, Asthma, COPD, PFO, Paroxysmal AFIB (not on AC 2/2 recurrent Hemoptysis), CVA w/ L hemiplegia (9/17), Persistent Hemoptysis 2/2 DAVID Bleed s/p Thoracotomy with DAVID Lung Lobectomy on 9/24/17 s/p IR Embolization of L Bronchial Artery on 11/16/17 complicated by PTX requiring Chest Tube, s/p Tracheostomy 11/17 for Acute Hypoxic Respiratory Failure s/p Decannuation and BronchoPulmonary Fistula p/w Recurrent Hemoptysis.

## 2019-04-28 NOTE — PATIENT PROFILE ADULT - VISION (WITH CORRECTIVE LENSES IF THE PATIENT USUALLY WEARS THEM):
Adult Mental Health Inpatient Program  Interdisciplinary Treatment Plan    Montserrat Ramirez  MRN:[id number]   :1969    Plan Type: Initial Plan    Your patient, Montserrat Ramirez was seen in the Adult Mental Health Inpatient Program.  Please see below for the patient agreed upon plan, including Goal(s), Focus Indicators, Outcomes and Interventions.      Goals:  Patient Reported Goal #1: I will attend groups within 72 hours  Goal #2: I will implement new coping skills to help manage symptoms.                   Goal Type:  Pt Reported Goal #1 Type: Short Term Goal  Goal #2 Type: Long Term Goal    Goal Progression:  Goal #1 Progression: Initiated  Goal #2 Progression: Initiated             Focus Indicators:  Anxiety, Irritability, Social Withdrawl, Sleep Problems, Suicidal Ideation    Outcomes:  Patient will attend and participate in therapeutic groups, Patient will collaborate with treatment team in planning continuing care, Patient will demonstrate and/or report use of distress tolerance skills, Patient will demonstrate and/or report use of self-activation skills, Patient will develop and practice 2 new coping skills to manage symtoms, Patient will follow up with outpatient providers, Patient will identify a support system in the community, Patient will participate in developing a Crisis Survival Plan    Interventions:  Assist patient to define leisure and challenges to leisure activities, Discuss role medications play in symptom management, Encourage group attendance to learn and reinforce positive coping strategies, Encourage identification and sharing of feelings in group to practice appropriate outlets, Encourage patient to practice use of community resources and report challenges and successes    Patient progress towards goals: Initial Treatment Plan      Fransisca Chang MS, [facility]   Partially impaired: cannot see medication labels or newsprint, but can see obstacles in path, and the surrounding layout; can count fingers at arm's length

## 2019-04-29 LAB
ANION GAP SERPL CALC-SCNC: 13 MMO/L — SIGNIFICANT CHANGE UP (ref 7–14)
BASOPHILS # BLD AUTO: 0.03 K/UL — SIGNIFICANT CHANGE UP (ref 0–0.2)
BASOPHILS NFR BLD AUTO: 0.2 % — SIGNIFICANT CHANGE UP (ref 0–2)
BUN SERPL-MCNC: 15 MG/DL — SIGNIFICANT CHANGE UP (ref 7–23)
CALCIUM SERPL-MCNC: 9.7 MG/DL — SIGNIFICANT CHANGE UP (ref 8.4–10.5)
CHLORIDE SERPL-SCNC: 94 MMOL/L — LOW (ref 98–107)
CO2 SERPL-SCNC: 30 MMOL/L — SIGNIFICANT CHANGE UP (ref 22–31)
CREAT SERPL-MCNC: 1.28 MG/DL — SIGNIFICANT CHANGE UP (ref 0.5–1.3)
EOSINOPHIL # BLD AUTO: 0.02 K/UL — SIGNIFICANT CHANGE UP (ref 0–0.5)
EOSINOPHIL NFR BLD AUTO: 0.1 % — SIGNIFICANT CHANGE UP (ref 0–6)
GLUCOSE SERPL-MCNC: 109 MG/DL — HIGH (ref 70–99)
HCT VFR BLD CALC: 36.9 % — LOW (ref 39–50)
HGB BLD-MCNC: 11 G/DL — LOW (ref 13–17)
IMM GRANULOCYTES NFR BLD AUTO: 0.6 % — SIGNIFICANT CHANGE UP (ref 0–1.5)
LYMPHOCYTES # BLD AUTO: 1.62 K/UL — SIGNIFICANT CHANGE UP (ref 1–3.3)
LYMPHOCYTES # BLD AUTO: 11.4 % — LOW (ref 13–44)
MAGNESIUM SERPL-MCNC: 1.8 MG/DL — SIGNIFICANT CHANGE UP (ref 1.6–2.6)
MCHC RBC-ENTMCNC: 27.4 PG — SIGNIFICANT CHANGE UP (ref 27–34)
MCHC RBC-ENTMCNC: 29.8 % — LOW (ref 32–36)
MCV RBC AUTO: 91.8 FL — SIGNIFICANT CHANGE UP (ref 80–100)
MONOCYTES # BLD AUTO: 2.42 K/UL — HIGH (ref 0–0.9)
MONOCYTES NFR BLD AUTO: 17 % — HIGH (ref 2–14)
NEUTROPHILS # BLD AUTO: 10.03 K/UL — HIGH (ref 1.8–7.4)
NEUTROPHILS NFR BLD AUTO: 70.7 % — SIGNIFICANT CHANGE UP (ref 43–77)
NRBC # FLD: 0 K/UL — SIGNIFICANT CHANGE UP (ref 0–0)
PHOSPHATE SERPL-MCNC: 4.3 MG/DL — SIGNIFICANT CHANGE UP (ref 2.5–4.5)
PLATELET # BLD AUTO: 220 K/UL — SIGNIFICANT CHANGE UP (ref 150–400)
PMV BLD: 12.3 FL — SIGNIFICANT CHANGE UP (ref 7–13)
POTASSIUM SERPL-MCNC: 3.9 MMOL/L — SIGNIFICANT CHANGE UP (ref 3.5–5.3)
POTASSIUM SERPL-SCNC: 3.9 MMOL/L — SIGNIFICANT CHANGE UP (ref 3.5–5.3)
RBC # BLD: 4.02 M/UL — LOW (ref 4.2–5.8)
RBC # FLD: 14.2 % — SIGNIFICANT CHANGE UP (ref 10.3–14.5)
SODIUM SERPL-SCNC: 137 MMOL/L — SIGNIFICANT CHANGE UP (ref 135–145)
VANCOMYCIN TROUGH SERPL-MCNC: 14.1 UG/ML — SIGNIFICANT CHANGE UP (ref 10–20)
WBC # BLD: 14.21 K/UL — HIGH (ref 3.8–10.5)
WBC # FLD AUTO: 14.21 K/UL — HIGH (ref 3.8–10.5)

## 2019-04-29 PROCEDURE — 99233 SBSQ HOSP IP/OBS HIGH 50: CPT | Mod: GC

## 2019-04-29 RX ORDER — LANOLIN ALCOHOL/MO/W.PET/CERES
3 CREAM (GRAM) TOPICAL ONCE
Qty: 0 | Refills: 0 | Status: COMPLETED | OUTPATIENT
Start: 2019-04-29 | End: 2019-04-29

## 2019-04-29 RX ADMIN — Medication 3 MILLIGRAM(S): at 02:44

## 2019-04-29 RX ADMIN — Medication 3 MILLILITER(S): at 17:34

## 2019-04-29 RX ADMIN — Medication 166.67 MILLIGRAM(S): at 23:51

## 2019-04-29 RX ADMIN — Medication 166.67 MILLIGRAM(S): at 11:51

## 2019-04-29 RX ADMIN — PIPERACILLIN AND TAZOBACTAM 25 GRAM(S): 4; .5 INJECTION, POWDER, LYOPHILIZED, FOR SOLUTION INTRAVENOUS at 18:03

## 2019-04-29 RX ADMIN — Medication 3 MILLILITER(S): at 10:25

## 2019-04-29 RX ADMIN — Medication 3 MILLILITER(S): at 04:49

## 2019-04-29 RX ADMIN — PIPERACILLIN AND TAZOBACTAM 25 GRAM(S): 4; .5 INJECTION, POWDER, LYOPHILIZED, FOR SOLUTION INTRAVENOUS at 07:10

## 2019-04-29 RX ADMIN — Medication 5 MILLIGRAM(S): at 07:10

## 2019-04-29 RX ADMIN — Medication 25 MILLIGRAM(S): at 08:31

## 2019-04-29 RX ADMIN — Medication 166.67 MILLIGRAM(S): at 00:06

## 2019-04-29 RX ADMIN — Medication 3 MILLILITER(S): at 21:32

## 2019-04-29 NOTE — PROGRESS NOTE ADULT - PROBLEM SELECTOR PLAN 1
- Patient with history of Hemoptysis 2/2 DAVID Bleed s/p Thoracotomy with DAVID Lung Lobectomy on 9/24/17 s/p IR emobolization of L Bronchial Artery 11/16/17 complicated by PTX requiring chest tube s/p Tracheostomy 11//17/17 for AHRF s/p Decannulation and Bronchopulmonary Fistula.   - COPD Exacerbation vs PNA likely causing Hemoptysis   - Duonebs Q6H. Prednisone home dose 5mg QD. c/w Zosyn and Vancomycin.   - Cough syrup. Arobica. c/w home inhalers   - Hold AC at this time and monitor HH.

## 2019-04-29 NOTE — SWALLOW BEDSIDE ASSESSMENT ADULT - SWALLOW EVAL: DIAGNOSIS
1. Pt presents with functional oral phase when given puree, regular solids, thin liquids, and nectar thick liquids marked by adequate bolus retrieval and containment, timely mastication/manipulation and transfer, and adequate oral clearance post swallow. 2. Pt presents with mild pharyngeal dysphagia when given puree, regular solids, thin liquids, and nectar thick liquids marked by suspected delayed swallow onset, reduced laryngeal elevation to palpation, and no (c)overt s/sx of aspiration. Pt's O2 sats remained in upper 90s pre, during, and post PO trials; vocal quality noted to be clear. 3. Recommend mechanical soft solids with thin liquids. Given COPD exacerbation vs. PNA and PMHx, recommend cinesophagram to objectively assess oropharyngeal swallow mechanism.

## 2019-04-29 NOTE — SWALLOW BEDSIDE ASSESSMENT ADULT - ASR SWALLOW ASPIRATION MONITOR
fever/pneumonia/throat clearing/upper respiratory infection/cough/gurgly voice/oral hygiene/position upright (90Y)/change of breathing pattern

## 2019-04-29 NOTE — PROGRESS NOTE ADULT - PROBLEM SELECTOR PLAN 6
- BP currently stable.   - Hold Losartan and Spironolactone for now given Hemoptysis.   - Continue home dose Toprol

## 2019-04-29 NOTE — PROGRESS NOTE ADULT - ASSESSMENT
Mr. Dickens is 49 YO M with PMH sarcoidosis c/b aspergillosis (s/p voriconazole course in 11/17), HTN, Asthma, COPD, PFO, Paroxysmal AFIB (not on AC 2/2 recurrent Hemoptysis), CVA w/ L hemiplegia (9/17), Persistent Hemoptysis 2/2 DAVID Bleed s/p Thoracotomy with DAVID Lung Lobectomy on 9/24/17 s/p IR Embolization of L Bronchial Artery on 11/16/17 complicated by PTX requiring Chest Tube, s/p Tracheostomy 11/17 for Acute Hypoxic Respiratory Failure s/p Decannuation and BronchoPulmonary Fistula p/w Recurrent Hemoptysis.

## 2019-04-29 NOTE — SWALLOW BEDSIDE ASSESSMENT ADULT - SLP PERTINENT HISTORY OF CURRENT PROBLEM
Pt is a 49 y/o M who presents with recurrent hemoptysis. Pt's PMHx is significant for sarcoidosis c/b aspergilosis (s/p voriconazole course 11/17), HTN, asthma, COPD, PFO, paroxysmal Afib (not on a/c 2/2 recurrent hemoptysis), CVA w/ L hemiplegia (9/17), persistent hemoptysis 2/2 DAVID bleed s/p thoracotomy with DAVID lung lobectomy on 9/24/17 s/p IR embolization of Left bronchial artery on 11/16/17 complicated by pneumothorax requiring chest tube, s/p tracheostomy 11/17 for acute hypoxic respiratory failure s/p decannuation and broncho-pulmonary fistula. Pt is a 49 y/o M who presents with recurrent hemoptysis, likely 2/2 COPD Exacerbation vs PNA. Pt's PMHx is significant for sarcoidosis c/b aspergilosis (s/p voriconazole course 11/17), HTN, asthma, COPD, PFO, paroxysmal Afib (not on a/c 2/2 recurrent hemoptysis), CVA w/ L hemiplegia (9/17), persistent hemoptysis 2/2 DAVID bleed s/p thoracotomy with DAVID lung lobectomy on 9/24/17 s/p IR embolization of Left bronchial artery on 11/16/17 complicated by pneumothorax requiring chest tube, s/p tracheostomy 11/17 for acute hypoxic respiratory failure s/p decannuation and broncho-pulmonary fistula.

## 2019-04-29 NOTE — PROGRESS NOTE ADULT - SUBJECTIVE AND OBJECTIVE BOX
CHIEF COMPLAINT:    Interval Events:    REVIEW OF SYSTEMS:  Constitutional:   Eyes:  ENT:  CV:  Resp:  GI:  :  MSK:  Integumentary:  Neurological:  Psychiatric:  Endocrine:  Hematologic/Lymphatic:  Allergic/Immunologic:  [ ] All other systems negative  [ ] Unable to assess ROS because ________    OBJECTIVE:  ICU Vital Signs Last 24 Hrs  T(C): 36.7 (29 Apr 2019 07:05), Max: 37.8 (28 Apr 2019 14:34)  T(F): 98.1 (29 Apr 2019 07:05), Max: 100 (28 Apr 2019 14:34)  HR: 116 (29 Apr 2019 07:05) (106 - 120)  BP: 98/67 (29 Apr 2019 07:05) (98/67 - 109/81)  BP(mean): --  ABP: --  ABP(mean): --  RR: 18 (29 Apr 2019 07:05) (18 - 19)  SpO2: 94% (29 Apr 2019 07:05) (94% - 100%)        04-28 @ 07:01  -  04-29 @ 07:00  --------------------------------------------------------  IN: 0 mL / OUT: 575 mL / NET: -575 mL      CAPILLARY BLOOD GLUCOSE          PHYSICAL EXAM:  General:   HEENT:   Lymph Nodes:  Neck:   Respiratory:   Cardiovascular:   Abdomen:   Extremities:   Skin:   Neurological:  Psychiatry:    HOSPITAL MEDICATIONS:  MEDICATIONS  (STANDING):  ALBUTerol/ipratropium for Nebulization 3 milliLiter(s) Nebulizer every 6 hours  influenza   Vaccine 0.5 milliLiter(s) IntraMuscular once  metoprolol succinate ER 25 milliGRAM(s) Oral daily  piperacillin/tazobactam IVPB. 3.375 Gram(s) IV Intermittent every 12 hours  predniSONE   Tablet 5 milliGRAM(s) Oral daily  vancomycin  IVPB 1250 milliGRAM(s) IV Intermittent every 12 hours    MEDICATIONS  (PRN):  acetaminophen   Tablet .. 650 milliGRAM(s) Oral every 6 hours PRN Temp greater or equal to 38C (100.4F), Moderate Pain (4 - 6)  guaiFENesin    Syrup 100 milliGRAM(s) Oral every 6 hours PRN Cough      LABS:                        10.1   12.40 )-----------( 202      ( 28 Apr 2019 06:30 )             34.0     04-29    137  |  94<L>  |  15  ----------------------------<  109<H>  3.9   |  30  |  1.28    Ca    9.7      29 Apr 2019 06:45  Phos  4.3     04-29  Mg     1.8     04-29    TPro  7.1  /  Alb  3.4  /  TBili  0.3  /  DBili  x   /  AST  10  /  ALT  9   /  AlkPhos  52  04-28    PT/INR - ( 27 Apr 2019 16:05 )   PT: 13.5 SEC;   INR: 1.21          PTT - ( 27 Apr 2019 16:05 )  PTT:27.3 SEC      Venous Blood Gas:  04-27 @ 16:05  7.31/65/37/28/62.4  VBG Lactate: 1.8      MICROBIOLOGY:     RADIOLOGY:  [ ] Reviewed and interpreted by me    PULMONARY FUNCTION TESTS:    EKG: CHIEF COMPLAINT: 48 M PMH sarcoidosis c/b aspergilosis (s/p voriconazole course 11/17), HTN, asthma, COPD, PFO, paroxysmal Afib (not on a/c 2/2 recurrent hemoptysis), CVA w/ L hemiplegia (9/17), persistent hemoptysis 2/2 DAVID bleed s/p thoracotomy with DAVID lung lobectomy on 9/24/17 s/p IR embolization of Left bronchial artery on 11/16/17 complicated by pneumothorax requiring chest tube, s/p tracheostomy 11/17 for acute hypoxic respiratory failure s/p decannuation and broncho-pulmonary fistula p/w recurrent hemoptysis today.    Interval Events: No acute events overnight    REVIEW OF SYSTEMS:  Constitutional:   Eyes:  ENT:  CV:  Resp:  GI:  :  MSK:  Integumentary:  Neurological:  Psychiatric:  Endocrine:  Hematologic/Lymphatic:  Allergic/Immunologic:  [ ] All other systems negative  [ ] Unable to assess ROS because ________    OBJECTIVE:  ICU Vital Signs Last 24 Hrs  T(C): 36.7 (29 Apr 2019 07:05), Max: 37.8 (28 Apr 2019 14:34)  T(F): 98.1 (29 Apr 2019 07:05), Max: 100 (28 Apr 2019 14:34)  HR: 116 (29 Apr 2019 07:05) (106 - 120)  BP: 98/67 (29 Apr 2019 07:05) (98/67 - 109/81)  BP(mean): --  ABP: --  ABP(mean): --  RR: 18 (29 Apr 2019 07:05) (18 - 19)  SpO2: 94% (29 Apr 2019 07:05) (94% - 100%)        04-28 @ 07:01  -  04-29 @ 07:00  --------------------------------------------------------  IN: 0 mL / OUT: 575 mL / NET: -575 mL      CAPILLARY BLOOD GLUCOSE          PHYSICAL EXAM:  General:   HEENT:   Lymph Nodes:  Neck:   Respiratory:   Cardiovascular:   Abdomen:   Extremities:   Skin:   Neurological:  Psychiatry:    HOSPITAL MEDICATIONS:  MEDICATIONS  (STANDING):  ALBUTerol/ipratropium for Nebulization 3 milliLiter(s) Nebulizer every 6 hours  influenza   Vaccine 0.5 milliLiter(s) IntraMuscular once  metoprolol succinate ER 25 milliGRAM(s) Oral daily  piperacillin/tazobactam IVPB. 3.375 Gram(s) IV Intermittent every 12 hours  predniSONE   Tablet 5 milliGRAM(s) Oral daily  vancomycin  IVPB 1250 milliGRAM(s) IV Intermittent every 12 hours    MEDICATIONS  (PRN):  acetaminophen   Tablet .. 650 milliGRAM(s) Oral every 6 hours PRN Temp greater or equal to 38C (100.4F), Moderate Pain (4 - 6)  guaiFENesin    Syrup 100 milliGRAM(s) Oral every 6 hours PRN Cough      LABS:                        10.1   12.40 )-----------( 202      ( 28 Apr 2019 06:30 )             34.0     04-29    137  |  94<L>  |  15  ----------------------------<  109<H>  3.9   |  30  |  1.28    Ca    9.7      29 Apr 2019 06:45  Phos  4.3     04-29  Mg     1.8     04-29    TPro  7.1  /  Alb  3.4  /  TBili  0.3  /  DBili  x   /  AST  10  /  ALT  9   /  AlkPhos  52  04-28    PT/INR - ( 27 Apr 2019 16:05 )   PT: 13.5 SEC;   INR: 1.21          PTT - ( 27 Apr 2019 16:05 )  PTT:27.3 SEC      Venous Blood Gas:  04-27 @ 16:05  7.31/65/37/28/62.4  VBG Lactate: 1.8      MICROBIOLOGY:     RADIOLOGY:  [ ] Reviewed and interpreted by me    PULMONARY FUNCTION TESTS:    EKG:

## 2019-04-29 NOTE — PROGRESS NOTE ADULT - PROBLEM SELECTOR PLAN 3
- s/p IVF hydration. Monitor CRE and Vancomycin trough.   - Renal dose medications. Avoid nephrotoxic agents.

## 2019-04-29 NOTE — SWALLOW BEDSIDE ASSESSMENT ADULT - COMMENTS
Pt known to SLP services, last seen on 02/23/18, at which time pt p/w +trach and recommended puree with honey thick liquids. Per pt report, pt was unaware of diet rec post d/c and has been consuming regular solids with thin liquids. Pt denied difficulty with swallowing. CXR 4/27 revealed: "Unchanged bullous disease, most severe in the left upper hemithorax." Pt's WBC is elevated, no fever.  Per pt report, pt decannulated ~3 weeks ago; pt p/w significantly weak vocal quality; when pt applies pressure to site, pt able to achieve WFL vocal quality/intensity. Pt followed commands consistently throughout session.

## 2019-04-29 NOTE — PROGRESS NOTE ADULT - PROBLEM SELECTOR PLAN 4
- Patient with Dysphagia 1 with Honey Thickened Liquids in 2/2018.   - f/u Repeat SS - Advance diet to mechanical soft with thin liquids  -f/u Cinesophagram results in am  -

## 2019-04-29 NOTE — PROVIDER CONTACT NOTE (OTHER) - ASSESSMENT
patient has history of tachycardia, heartrate at 114, above md parameters, patient asymptomatic, dangling in bed.

## 2019-04-29 NOTE — SWALLOW BEDSIDE ASSESSMENT ADULT - SWALLOW EVAL: RECOMMENDED FEEDING/EATING TECHNIQUES
maintain upright posture during/after eating for 30 mins/no straws/allow for swallow between intakes/oral hygiene/alternate food with liquid/position upright (90 degrees)/small sips/bites

## 2019-04-30 ENCOUNTER — TRANSCRIPTION ENCOUNTER (OUTPATIENT)
Age: 49
End: 2019-04-30

## 2019-04-30 LAB
ANION GAP SERPL CALC-SCNC: 13 MMO/L — SIGNIFICANT CHANGE UP (ref 7–14)
APPEARANCE UR: SIGNIFICANT CHANGE UP
BACTERIA # UR AUTO: NEGATIVE — SIGNIFICANT CHANGE UP
BASOPHILS # BLD AUTO: 0.01 K/UL — SIGNIFICANT CHANGE UP (ref 0–0.2)
BASOPHILS NFR BLD AUTO: 0.1 % — SIGNIFICANT CHANGE UP (ref 0–2)
BILIRUB UR-MCNC: NEGATIVE — SIGNIFICANT CHANGE UP
BLOOD UR QL VISUAL: HIGH
BUN SERPL-MCNC: 13 MG/DL — SIGNIFICANT CHANGE UP (ref 7–23)
CALCIUM SERPL-MCNC: 9.6 MG/DL — SIGNIFICANT CHANGE UP (ref 8.4–10.5)
CHLORIDE SERPL-SCNC: 91 MMOL/L — LOW (ref 98–107)
CO2 SERPL-SCNC: 30 MMOL/L — SIGNIFICANT CHANGE UP (ref 22–31)
COLOR SPEC: YELLOW — SIGNIFICANT CHANGE UP
CREAT SERPL-MCNC: 1.33 MG/DL — HIGH (ref 0.5–1.3)
EOSINOPHIL # BLD AUTO: 0.07 K/UL — SIGNIFICANT CHANGE UP (ref 0–0.5)
EOSINOPHIL NFR BLD AUTO: 0.5 % — SIGNIFICANT CHANGE UP (ref 0–6)
GLUCOSE SERPL-MCNC: 113 MG/DL — HIGH (ref 70–99)
GLUCOSE UR-MCNC: NEGATIVE — SIGNIFICANT CHANGE UP
HCT VFR BLD CALC: 33.7 % — LOW (ref 39–50)
HGB BLD-MCNC: 10.1 G/DL — LOW (ref 13–17)
HYALINE CASTS # UR AUTO: NEGATIVE — SIGNIFICANT CHANGE UP
IMM GRANULOCYTES NFR BLD AUTO: 0.5 % — SIGNIFICANT CHANGE UP (ref 0–1.5)
KETONES UR-MCNC: NEGATIVE — SIGNIFICANT CHANGE UP
LEUKOCYTE ESTERASE UR-ACNC: NEGATIVE — SIGNIFICANT CHANGE UP
LYMPHOCYTES # BLD AUTO: 1.39 K/UL — SIGNIFICANT CHANGE UP (ref 1–3.3)
LYMPHOCYTES # BLD AUTO: 10.3 % — LOW (ref 13–44)
MCHC RBC-ENTMCNC: 26.8 PG — LOW (ref 27–34)
MCHC RBC-ENTMCNC: 30 % — LOW (ref 32–36)
MCV RBC AUTO: 89.4 FL — SIGNIFICANT CHANGE UP (ref 80–100)
MONOCYTES # BLD AUTO: 2.53 K/UL — HIGH (ref 0–0.9)
MONOCYTES NFR BLD AUTO: 18.7 % — HIGH (ref 2–14)
NEUTROPHILS # BLD AUTO: 9.47 K/UL — HIGH (ref 1.8–7.4)
NEUTROPHILS NFR BLD AUTO: 69.9 % — SIGNIFICANT CHANGE UP (ref 43–77)
NITRITE UR-MCNC: NEGATIVE — SIGNIFICANT CHANGE UP
NRBC # FLD: 0 K/UL — SIGNIFICANT CHANGE UP (ref 0–0)
PH UR: 6 — SIGNIFICANT CHANGE UP (ref 5–8)
PLATELET # BLD AUTO: 198 K/UL — SIGNIFICANT CHANGE UP (ref 150–400)
PMV BLD: 12.2 FL — SIGNIFICANT CHANGE UP (ref 7–13)
POTASSIUM SERPL-MCNC: 3.8 MMOL/L — SIGNIFICANT CHANGE UP (ref 3.5–5.3)
POTASSIUM SERPL-SCNC: 3.8 MMOL/L — SIGNIFICANT CHANGE UP (ref 3.5–5.3)
PROT UR-MCNC: 50 — SIGNIFICANT CHANGE UP
RBC # BLD: 3.77 M/UL — LOW (ref 4.2–5.8)
RBC # FLD: 14.2 % — SIGNIFICANT CHANGE UP (ref 10.3–14.5)
RBC CASTS # UR COMP ASSIST: SIGNIFICANT CHANGE UP (ref 0–?)
SODIUM SERPL-SCNC: 134 MMOL/L — LOW (ref 135–145)
SP GR SPEC: 1.02 — SIGNIFICANT CHANGE UP (ref 1–1.04)
SQUAMOUS # UR AUTO: SIGNIFICANT CHANGE UP
UROBILINOGEN FLD QL: NORMAL — SIGNIFICANT CHANGE UP
WBC # BLD: 13.54 K/UL — HIGH (ref 3.8–10.5)
WBC # FLD AUTO: 13.54 K/UL — HIGH (ref 3.8–10.5)
WBC UR QL: HIGH (ref 0–?)

## 2019-04-30 PROCEDURE — 71045 X-RAY EXAM CHEST 1 VIEW: CPT | Mod: 26

## 2019-04-30 PROCEDURE — 99233 SBSQ HOSP IP/OBS HIGH 50: CPT | Mod: GC

## 2019-04-30 RX ORDER — IPRATROPIUM/ALBUTEROL SULFATE 18-103MCG
3 AEROSOL WITH ADAPTER (GRAM) INHALATION ONCE
Qty: 0 | Refills: 0 | Status: COMPLETED | OUTPATIENT
Start: 2019-04-30 | End: 2019-04-30

## 2019-04-30 RX ORDER — PIPERACILLIN AND TAZOBACTAM 4; .5 G/20ML; G/20ML
3.38 INJECTION, POWDER, LYOPHILIZED, FOR SOLUTION INTRAVENOUS EVERY 8 HOURS
Qty: 0 | Refills: 0 | Status: DISCONTINUED | OUTPATIENT
Start: 2019-04-30 | End: 2019-05-03

## 2019-04-30 RX ADMIN — Medication 3 MILLILITER(S): at 09:20

## 2019-04-30 RX ADMIN — Medication 166.67 MILLIGRAM(S): at 12:10

## 2019-04-30 RX ADMIN — PIPERACILLIN AND TAZOBACTAM 25 GRAM(S): 4; .5 INJECTION, POWDER, LYOPHILIZED, FOR SOLUTION INTRAVENOUS at 23:01

## 2019-04-30 RX ADMIN — Medication 3 MILLILITER(S): at 16:23

## 2019-04-30 RX ADMIN — PIPERACILLIN AND TAZOBACTAM 25 GRAM(S): 4; .5 INJECTION, POWDER, LYOPHILIZED, FOR SOLUTION INTRAVENOUS at 05:42

## 2019-04-30 RX ADMIN — Medication 5 MILLIGRAM(S): at 05:42

## 2019-04-30 RX ADMIN — PIPERACILLIN AND TAZOBACTAM 25 GRAM(S): 4; .5 INJECTION, POWDER, LYOPHILIZED, FOR SOLUTION INTRAVENOUS at 14:03

## 2019-04-30 RX ADMIN — Medication 0.5 MILLIGRAM(S): at 14:03

## 2019-04-30 RX ADMIN — Medication 650 MILLIGRAM(S): at 15:58

## 2019-04-30 RX ADMIN — Medication 3 MILLILITER(S): at 03:17

## 2019-04-30 RX ADMIN — Medication 3 MILLILITER(S): at 11:51

## 2019-04-30 RX ADMIN — Medication 3 MILLILITER(S): at 21:20

## 2019-04-30 RX ADMIN — Medication 25 MILLIGRAM(S): at 05:42

## 2019-04-30 NOTE — PROGRESS NOTE ADULT - ASSESSMENT
Patient is here for a routine OB check. No new significant problems or changes are noted. Declined first trimester screening. Mr. Dickens is 47 YO M with PMH sarcoidosis c/b aspergillosis (s/p voriconazole course in 11/17), HTN, Asthma, COPD, PFO, Paroxysmal AFIB (not on AC 2/2 recurrent Hemoptysis), CVA w/ L hemiplegia (9/17), Persistent Hemoptysis 2/2 DAVID Bleed s/p Thoracotomy with DAVID Lung Lobectomy on 9/24/17 s/p IR Embolization of L Bronchial Artery on 11/16/17 complicated by PTX requiring Chest Tube, s/p Tracheostomy 11/17 for Acute Hypoxic Respiratory Failure s/p Decannuation and BronchoPulmonary Fistula p/w Recurrent Hemoptysis.

## 2019-04-30 NOTE — PROGRESS NOTE ADULT - PROBLEM SELECTOR PLAN 3
- s/p IVF hydration. Monitor CRE and Vancomycin trough.   - Renal dose medications. Avoid nephrotoxic agents. - s/p IVF hydration. Monitor CRE   - Renal dose medications.   - Avoid nephrotoxic agents.

## 2019-04-30 NOTE — DISCHARGE NOTE PROVIDER - HOSPITAL COURSE
48 M PMH sarcoidosis c/b aspergilosis (s/p voriconazole course 11/17), HTN, asthma, COPD, PFO, paroxysmal Afib (not on a/c 2/2 recurrent hemoptysis), CVA w/ L hemiplegia (9/17), persistent hemoptysis 2/2 DAVID bleed s/p thoracotomy with DAVID lung lobectomy on 9/24/17 s/p IR embolization of Left bronchial artery on 11/16/17 complicated by pneumothorax requiring chest tube, s/p tracheostomy 11/17 for acute hypoxic respiratory failure s/p decannuation and broncho-pulmonary fistula presented to [facility] for recurrent hemoptysis today.            COPD Exacerbation vs PNA likely causing Hemopytsis     - Patient with history of Hemoptysis 2/2 DAVID Bleed s/p Thoracotomy with DAVID Lung Lobectomy on 9/24/17 s/p IR emobolization of L Bronchial Artery 11/16/17 complicated by pneumothorax requiring chest tube s/p Tracheostomy 11//17/17 for AHRF s/p Decannulation and Bronchopulmonary Fistula.     - COPD Exacerbation vs PNA likely causing Hemoptysis     - Treated with duonebs  & Prednisone home dose 5mg QD.     -treated with  Zosyn and Vancomycin.     - Cough syrup. Arobica. c/w home inhalers     - Held AC during hospitalization         Dysphagia     - Patient with Dysphagia 1 with Honey Thickened Liquids in 2/2018.     - Repeat SS -mechanical soft with thin liquids    -F/u Cine-------------------        Questionable JERRI     - s/p IVF hydration. Monitor CRE and Vancomycin trough.     - Renal dosed medications. Avoid nephrotoxic agents.         Sacoidosis     - Continue on home inhalers and Prednisone 5mg PO QD as above         HTN     - BP currently stable. Hold Losartan and Spironolactone for now given Hemoptysis.     - Continue home dose Toprol         DVT PPX - SCD 48 M PMH sarcoidosis c/b aspergilosis (s/p voriconazole course 11/17), HTN, asthma, COPD, PFO, paroxysmal Afib (not on a/c 2/2 recurrent hemoptysis), CVA w/ L hemiplegia (9/17), persistent hemoptysis 2/2 DAVID bleed s/p thoracotomy with DAVID lung lobectomy on 9/24/17 s/p IR embolization of Left bronchial artery on 11/16/17 complicated by pneumothorax requiring chest tube, s/p tracheostomy 11/17 for acute hypoxic respiratory failure s/p decannuation and broncho-pulmonary fistula presented to [facility] for recurrent hemoptysis today.    sputum culture showing pseudomonas - sensitive to zosyn.    Blood cultures negative.    Passed cine - diet advanced to soft.    Trach stoma healing.    Tolerating room air.                dispo: 48 M PMH sarcoidosis c/b aspergilosis (s/p voriconazole course 11/17), HTN, asthma, COPD, PFO, paroxysmal Afib (not on a/c 2/2 recurrent hemoptysis), CVA w/ L hemiplegia (9/17), persistent hemoptysis 2/2 DAVID bleed s/p thoracotomy with DAVID lung lobectomy on 9/24/17 s/p IR embolization of Left bronchial artery on 11/16/17 complicated by pneumothorax requiring chest tube, s/p tracheostomy 11/17 for acute hypoxic respiratory failure s/p decannuation and broncho-pulmonary fistula presented to [facility] for recurrent hemoptysis today.    sputum culture showing pseudomonas - sensitive to zosyn.    Blood cultures negative.    Passed cine - diet advanced to soft.    Trach stoma healing.    Tolerating room air.    PICC placed on 5/2 for 2 week total course of zosyn.            dispo: to home

## 2019-04-30 NOTE — PROGRESS NOTE ADULT - SUBJECTIVE AND OBJECTIVE BOX
CHIEF COMPLAINT:    48 year old male patient who presented with recurrent hemoptysis    Interval Events: no acute events overnight       OBJECTIVE:  ICU Vital Signs Last 24 Hrs  T(C): 37.5 (30 Apr 2019 05:40), Max: 37.5 (30 Apr 2019 05:40)  T(F): 99.5 (30 Apr 2019 05:40), Max: 99.5 (30 Apr 2019 05:40)  HR: 133 (30 Apr 2019 05:40) (106 - 133)  BP: 101/67 (30 Apr 2019 05:40) (99/56 - 111/80)  BP(mean): --  ABP: --  ABP(mean): --  RR: 18 (30 Apr 2019 05:40) (18 - 18)  SpO2: 100% (30 Apr 2019 05:40) (95% - 100%)        04-29 @ 07:01  -  04-30 @ 07:00  --------------------------------------------------------  IN: 350 mL / OUT: 630 mL / NET: -280 mL      CAPILLARY BLOOD GLUCOSE    HOSPITAL MEDICATIONS:  MEDICATIONS  (STANDING):  ALBUTerol/ipratropium for Nebulization 3 milliLiter(s) Nebulizer every 6 hours  influenza   Vaccine 0.5 milliLiter(s) IntraMuscular once  metoprolol succinate ER 25 milliGRAM(s) Oral daily  piperacillin/tazobactam IVPB. 3.375 Gram(s) IV Intermittent every 12 hours  predniSONE   Tablet 5 milliGRAM(s) Oral daily  vancomycin  IVPB 1250 milliGRAM(s) IV Intermittent every 12 hours    MEDICATIONS  (PRN):  acetaminophen   Tablet .. 650 milliGRAM(s) Oral every 6 hours PRN Temp greater or equal to 38C (100.4F), Moderate Pain (4 - 6)  guaiFENesin    Syrup 100 milliGRAM(s) Oral every 6 hours PRN Cough      LABS:                        10.1   13.54 )-----------( 198      ( 30 Apr 2019 05:35 )             33.7     04-30    134<L>  |  91<L>  |  13  ----------------------------<  113<H>  3.8   |  30  |  1.33<H>    Ca    9.6      30 Apr 2019 05:35  Phos  4.3     04-29  Mg     1.8     04-29

## 2019-04-30 NOTE — DISCHARGE NOTE PROVIDER - NSFOLLOWUPCLINICS_GEN_ALL_ED_FT
[facility]  Pulmonology  [address] [place] [address]  Phone: [phone]  Fax:   Follow Up Time:

## 2019-04-30 NOTE — PROVIDER CONTACT NOTE (OTHER) - ASSESSMENT
Heartrate elevated range 130's, patient sitting up in bed, asymptomatic, denies pain, all due meds given

## 2019-04-30 NOTE — DISCHARGE NOTE PROVIDER - CARE PROVIDER_API CALL
Lisker, Gita N (MD)  Medicine  Pulmonary Medicine  [address] [place] [address], [place] [address]  Phone: [phone]  Fax: [phone]  Follow Up Time:

## 2019-04-30 NOTE — DISCHARGE NOTE PROVIDER - NSDCCPCAREPLAN_GEN_ALL_CORE_FT
PRINCIPAL DISCHARGE DIAGNOSIS  Diagnosis: Hemoptysis  Assessment and Plan of Treatment:       SECONDARY DISCHARGE DIAGNOSES  Diagnosis: COPD (chronic obstructive pulmonary disease)  Assessment and Plan of Treatment: Continue your inhalers and annual pulmonary function tests with your outpatient provider. Monitor for asthma exacerbation, such as, shortness of breath, hyperventilation, or any other difficulties breathing and report to the emergency room in the event that your rescue inhaler has not resolved your symptoms    Diagnosis: JERRI (acute kidney injury)  Assessment and Plan of Treatment: JERRI (acute kidney injury)    Diagnosis: Sarcoidosis  Assessment and Plan of Treatment: Continue with prednsione 5mg    Diagnosis: Hypertension  Assessment and Plan of Treatment: Continue current blood pressure medication regimen as directed. Monitor for any visual changes, headaches or dizziness.  Monitor blood pressure regularly.  Follow up with your PCP for further management for high blood pressure, please call to make appointment within 1 week of discharge      Diagnosis: Dysphagia  Assessment and Plan of Treatment: Dysphagia PRINCIPAL DISCHARGE DIAGNOSIS  Diagnosis: Hemoptysis  Assessment and Plan of Treatment: Now resolved.  Continue with antibiotics---------------  Please follow up with pulmonary - you have an appointment on 5/7/19 at 10:15.  Please call the office if you cannot make this appointment.      SECONDARY DISCHARGE DIAGNOSES  Diagnosis: COPD (chronic obstructive pulmonary disease)  Assessment and Plan of Treatment: Continue your inhalers and annual pulmonary function tests with your outpatient provider. Monitor for asthma exacerbation, such as, shortness of breath, hyperventilation, or any other difficulties breathing and report to the emergency room in the event that your rescue inhaler has not resolved your symptoms    Diagnosis: JERRI (acute kidney injury)  Assessment and Plan of Treatment:     Diagnosis: Hypertension  Assessment and Plan of Treatment: Continue current blood pressure medication regimen as directed. Monitor for any visual changes, headaches or dizziness.  Monitor blood pressure regularly.  Follow up with your PCP for further management for high blood pressure, please call to make appointment within 1 week of discharge      Diagnosis: Sarcoidosis  Assessment and Plan of Treatment: Continue with prednisne 5mg.    Diagnosis: Dysphagia  Assessment and Plan of Treatment: Soft diet with thin liquids as tolerated. PRINCIPAL DISCHARGE DIAGNOSIS  Diagnosis: Hemoptysis  Assessment and Plan of Treatment: Now resolved.  Continue with antibiotics through 5/11 via PICC line.  Please follow up with pulmonary - you have an appointment on 5/7/19 at 10:15.  Please call the office if you cannot make this appointment.      SECONDARY DISCHARGE DIAGNOSES  Diagnosis: COPD (chronic obstructive pulmonary disease)  Assessment and Plan of Treatment: Continue your inhalers and annual pulmonary function tests with your outpatient provider. Monitor for exacerbation, such as, shortness of breath, hyperventilation, or any other difficulties breathing and report to the emergency room in the event that your rescue inhaler has not resolved your symptoms    Diagnosis: JERRI (acute kidney injury)  Assessment and Plan of Treatment: Improving - please follow up wtih your PCP within 1 week of discharge for repeat BMP check.    Diagnosis: PAF (paroxysmal atrial fibrillation)  Assessment and Plan of Treatment: Continue metoprolol for rate control.    Diagnosis: Hypertension  Assessment and Plan of Treatment: Blood pressure is stable off medications at this time (they were stopped due to your kidney). Please follow up with your PCP within 1 week for repeat bloodwork and blood pressure check - they may want to restart your medications at that time.      Diagnosis: Sarcoidosis  Assessment and Plan of Treatment: Continue with prednisone 5mg daily.    Diagnosis: Dysphagia  Assessment and Plan of Treatment: Soft diet with thin liquids as tolerated.

## 2019-04-30 NOTE — PROGRESS NOTE ADULT - PROBLEM SELECTOR PLAN 2
- Continue on steroids and nebs as above. - Continue on steroids and nebs as above.  4/30 - febrile - fu blood cx______ fu ua____  fu chest xray_____

## 2019-04-30 NOTE — PROGRESS NOTE ADULT - PROBLEM SELECTOR PLAN 4
- Advance diet to mechanical soft with thin liquids  -f/u Cinesophagram results in am  - - Advance diet to mechanical soft with thin liquids  -f/u Cinesophagram (4/30 - to cine - unable to perform 2/2 Sob, tachycardia)  ?cine 5/1___

## 2019-04-30 NOTE — PROGRESS NOTE ADULT - PROBLEM SELECTOR PLAN 1
- Patient with history of Hemoptysis 2/2 DAVID Bleed s/p Thoracotomy with DAVID Lung Lobectomy on 9/24/17 s/p IR emobolization of L Bronchial Artery 11/16/17 complicated by PTX requiring chest tube s/p Tracheostomy 11//17/17 for AHRF s/p Decannulation and Bronchopulmonary Fistula.   - COPD Exacerbation vs PNA likely causing Hemoptysis   - Duonebs Q6H. Prednisone home dose 5mg QD. c/w Zosyn and Vancomycin.   - Cough syrup. Arobica. c/w home inhalers   - Hold AC at this time and monitor HH. - Patient with history of Hemoptysis 2/2 DAVID Bleed s/p Thoracotomy with DAVID Lung Lobectomy on 9/24/17 s/p IR emobolization of L Bronchial Artery 11/16/17 complicated by PTX requiring chest tube s/p Tracheostomy 11//17/17 for AHRF s/p Decannulation and Bronchopulmonary Fistula.   - COPD Exacerbation vs PNA likely causing Hemoptysis   - Duonebs Q6H. Prednisone home dose 5mg QD. c/w Zosyn   - Cough syrup. Arobica. c/w home inhalers   - Hold AC at this time and monitor HH.

## 2019-05-01 LAB
-  AMIKACIN: SIGNIFICANT CHANGE UP
-  AZTREONAM: SIGNIFICANT CHANGE UP
-  CEFEPIME: SIGNIFICANT CHANGE UP
-  CEFTAZIDIME: SIGNIFICANT CHANGE UP
-  CIPROFLOXACIN: SIGNIFICANT CHANGE UP
-  GENTAMICIN: SIGNIFICANT CHANGE UP
-  IMIPENEM: SIGNIFICANT CHANGE UP
-  LEVOFLOXACIN: SIGNIFICANT CHANGE UP
-  MEROPENEM: SIGNIFICANT CHANGE UP
-  PIPERACILLIN/TAZOBACTAM: SIGNIFICANT CHANGE UP
-  TOBRAMYCIN: SIGNIFICANT CHANGE UP
ANION GAP SERPL CALC-SCNC: 13 MMO/L — SIGNIFICANT CHANGE UP (ref 7–14)
BACTERIA SPT RESP CULT: SIGNIFICANT CHANGE UP
BUN SERPL-MCNC: 14 MG/DL — SIGNIFICANT CHANGE UP (ref 7–23)
CALCIUM SERPL-MCNC: 9.7 MG/DL — SIGNIFICANT CHANGE UP (ref 8.4–10.5)
CHLORIDE SERPL-SCNC: 93 MMOL/L — LOW (ref 98–107)
CO2 SERPL-SCNC: 30 MMOL/L — SIGNIFICANT CHANGE UP (ref 22–31)
CREAT SERPL-MCNC: 1.64 MG/DL — HIGH (ref 0.5–1.3)
GLUCOSE SERPL-MCNC: 118 MG/DL — HIGH (ref 70–99)
GRAM STN SPT: SIGNIFICANT CHANGE UP
HCT VFR BLD CALC: 34.5 % — LOW (ref 39–50)
HGB BLD-MCNC: 10.2 G/DL — LOW (ref 13–17)
MCHC RBC-ENTMCNC: 26.9 PG — LOW (ref 27–34)
MCHC RBC-ENTMCNC: 29.6 % — LOW (ref 32–36)
MCV RBC AUTO: 91 FL — SIGNIFICANT CHANGE UP (ref 80–100)
METHOD TYPE: SIGNIFICANT CHANGE UP
NRBC # FLD: 0 K/UL — SIGNIFICANT CHANGE UP (ref 0–0)
ORGANISM # SPEC MICROSCOPIC CNT: SIGNIFICANT CHANGE UP
ORGANISM # SPEC MICROSCOPIC CNT: SIGNIFICANT CHANGE UP
PLATELET # BLD AUTO: 212 K/UL — SIGNIFICANT CHANGE UP (ref 150–400)
PMV BLD: 12 FL — SIGNIFICANT CHANGE UP (ref 7–13)
POTASSIUM SERPL-MCNC: 3.9 MMOL/L — SIGNIFICANT CHANGE UP (ref 3.5–5.3)
POTASSIUM SERPL-SCNC: 3.9 MMOL/L — SIGNIFICANT CHANGE UP (ref 3.5–5.3)
RBC # BLD: 3.79 M/UL — LOW (ref 4.2–5.8)
RBC # FLD: 14.3 % — SIGNIFICANT CHANGE UP (ref 10.3–14.5)
SODIUM SERPL-SCNC: 136 MMOL/L — SIGNIFICANT CHANGE UP (ref 135–145)
SPECIMEN SOURCE: SIGNIFICANT CHANGE UP
WBC # BLD: 13.26 K/UL — HIGH (ref 3.8–10.5)
WBC # FLD AUTO: 13.26 K/UL — HIGH (ref 3.8–10.5)

## 2019-05-01 PROCEDURE — 74230 X-RAY XM SWLNG FUNCJ C+: CPT | Mod: 26

## 2019-05-01 PROCEDURE — 99233 SBSQ HOSP IP/OBS HIGH 50: CPT | Mod: GC

## 2019-05-01 RX ORDER — IPRATROPIUM/ALBUTEROL SULFATE 18-103MCG
3 AEROSOL WITH ADAPTER (GRAM) INHALATION ONCE
Qty: 0 | Refills: 0 | Status: COMPLETED | OUTPATIENT
Start: 2019-05-01 | End: 2019-05-01

## 2019-05-01 RX ADMIN — Medication 3 MILLILITER(S): at 21:46

## 2019-05-01 RX ADMIN — Medication 0.5 MILLIGRAM(S): at 17:09

## 2019-05-01 RX ADMIN — PIPERACILLIN AND TAZOBACTAM 25 GRAM(S): 4; .5 INJECTION, POWDER, LYOPHILIZED, FOR SOLUTION INTRAVENOUS at 22:43

## 2019-05-01 RX ADMIN — Medication 25 MILLIGRAM(S): at 06:53

## 2019-05-01 RX ADMIN — Medication 0.5 MILLIGRAM(S): at 22:43

## 2019-05-01 RX ADMIN — PIPERACILLIN AND TAZOBACTAM 25 GRAM(S): 4; .5 INJECTION, POWDER, LYOPHILIZED, FOR SOLUTION INTRAVENOUS at 06:53

## 2019-05-01 RX ADMIN — PIPERACILLIN AND TAZOBACTAM 25 GRAM(S): 4; .5 INJECTION, POWDER, LYOPHILIZED, FOR SOLUTION INTRAVENOUS at 14:47

## 2019-05-01 RX ADMIN — Medication 0.5 MILLIGRAM(S): at 00:05

## 2019-05-01 RX ADMIN — Medication 3 MILLILITER(S): at 05:36

## 2019-05-01 RX ADMIN — Medication 3 MILLILITER(S): at 10:31

## 2019-05-01 RX ADMIN — Medication 5 MILLIGRAM(S): at 06:53

## 2019-05-01 RX ADMIN — Medication 3 MILLILITER(S): at 16:35

## 2019-05-01 RX ADMIN — Medication 3 MILLILITER(S): at 01:04

## 2019-05-01 NOTE — SWALLOW VFSS/MBS ASSESSMENT ADULT - COMMENTS
patient came down for Modified barium swallow study however upon entering radiology suite, patient noted with shortness of breath despite oxygen being increased from 2 to 3 liters, and continues to have oxygen saturation of 89-90 and a heart rate of 133.  ADS at [employer] #[id number] was notified and patient was sent back to the floor.  Modified Barium Swallow study was not completed at this time.
As per charting, "Mr. Dickens is 49 YO M with PMH sarcoidosis c/b aspergillosis (s/p voriconazole course in 11/17), HTN, Asthma, COPD, PFO, Paroxysmal AFIB (not on AC 2/2 recurrent Hemoptysis), CVA w/ L hemiplegia (9/17), Persistent Hemoptysis 2/2 DAVID Bleed s/p Thoracotomy with DAVID Lung Lobectomy on 9/24/17 s/p IR Embolization of L Bronchial Artery on 11/16/17 complicated by PTX requiring Chest Tube, s/p Tracheostomy 11/17 for Acute Hypoxic Respiratory Failure s/p Decannuation and BronchoPulmonary Fistula p/w Recurrent Hemoptysis."    Patient was received awake, alert and cooperative in the radiology suite this AM. He was accompanied by his girlfriend. Supplemental O2 via NC at 4L in place. Open stoma noted.

## 2019-05-01 NOTE — SWALLOW VFSS/MBS ASSESSMENT ADULT - RECOMMENDED CONSISTENCY
1) Soft Solids with Thin Liquids  2) Dysphagia Precautions: Upright position (90 degree angle) during/after eating for 30 min; Small bites/sips; Slow rate; No straws  3) Patient will no longer be followed by this service; MD may reconsult if concern for change in status

## 2019-05-01 NOTE — SWALLOW VFSS/MBS ASSESSMENT ADULT - DIAGNOSTIC IMPRESSIONS
Patient demonstrated Mild Oropharyngeal Dysphagia. Oral stage was characterized by adequate oral acceptance and containment, adequate bolus collection and adequate anterior-posterior transfer across trials of puree, regular solids, nectar-thick and thin liquids. Slow mastication noted due to decreased dentition status however patient able to break down regular solid texture. Reduced tongue to palate contact was observed to result in trace premature spillage of the bolus over base of tongue to oropharynx prior to swallow trigger for nectar-thick and thin liquids. There was a timely pharyngeal trigger, adequate hyolaryngeal elevation/excursion and adequate pharyngeal constriction. Mildly reduced base of tongue retraction was noted and resulted in trace residue in the valleculae post swallow for puree and regular solids. Liquid wash was shown to be effective in facilitating adequate clearance of vallecular residue. There was no evidence of laryngeal penetration/aspiration across trials of puree, regular solids, nectar-thick and thin liquids. Patient demonstrated Mild Oropharyngeal Dysphagia. Oral stage was characterized by adequate oral acceptance and containment, adequate bolus collection and adequate anterior-posterior transfer across trials of puree, regular solids, nectar-thick and thin liquids. Slow mastication noted as a result of decreased dentition status however patient able to break down regular solid texture. Reduced tongue to palate contact was observed to result in trace premature spillage of the bolus over base of tongue to oropharynx prior to swallow trigger for nectar-thick and thin liquids. There was a timely pharyngeal trigger, adequate hyolaryngeal elevation/excursion and adequate pharyngeal constriction. Mildly reduced base of tongue retraction was noted and resulted in trace residue in the valleculae post swallow for puree and regular solids. Liquid wash was shown to be effective in facilitating adequate clearance of residue from valleculae. There was no evidence of laryngeal penetration/aspiration across trials of puree, regular solids, nectar-thick and thin liquids.

## 2019-05-01 NOTE — SWALLOW VFSS/MBS ASSESSMENT ADULT - PHARYNGEAL PHASE COMMENTS
Timely pharyngeal trigger; Reduced base of tongue retraction; Trace residue in valleculae post swallow; Adequate hyolaryngeal elevation/excursion; Adequate pharyngeal constriction Timely pharyngeal trigger; Reduced baes of tongue retraction; Adequate hyolaryngeal elevation/excursion; Adequate pharyngeal constriction.

## 2019-05-01 NOTE — PROGRESS NOTE ADULT - PROBLEM SELECTOR PLAN 1
OUTPATIENT INFUSION CENTER    DISCHARGE INSTRUCTIONS FOR:  BLOOD TRANSFUSION    We hope you are feeling better after your blood transfusion. Some mild tenderness or slight bruising at your IV site is normal.  Avoid lifting or heavy use of that extremity for the rest of the day. Drink plenty of fluids, eat a normal diet and get some rest.    There are some important signs that you need to watch for in case you experience a delayed reaction to the blood you have received. Call your physician immediately if you develop any of the following symptoms:    1. Severe headache or backache;    2. Fever above 100 degrees;    3. Chills;    4. Difficulty breathing;    5.  Blood or red color in urine;    6. The feeling of weakness or constant fatigue;    7. Yellowing of the whites of your eyes or skin (jaundice). If your physician is not available, call or go to the nearest emergency room, or dial 911.     Lloyd Ness, Signature: ___________________________ 10/12/2017 - hxof hemoptysis 2/2 DAVID Bleed s/p Thoracotomy with DAVID Lung Lobectomy on 9/24/17 s/p IR emobolization of L Bronchial Artery 11/16/17 complicated by PTX requiring chest tube s/p Tracheostomy 11//17/17 for AHRF s/p Decannulation and Bronchopulmonary Fistula  - COPD Exacerbation vs PNA likely causing Hemoptysis   - sputum culture showing pseudomonas  - sensitive to zosyn  - cont for now - hxof hemoptysis 2/2 DAVID Bleed s/p Thoracotomy with DAVID Lung Lobectomy on 9/24/17 s/p IR emobolization of L Bronchial Artery 11/16/17 complicated by PTX requiring chest tube s/p Tracheostomy 11//17/17 for AHRF s/p Decannulation and Bronchopulmonary Fistula  - COPD Exacerbation vs PNA likely causing Hemoptysis   - sputum culture showing pseudomonas  - sensitive to zosyn  - cont for now  - may need to consider chronic inhaled abx as outpatient for suppression

## 2019-05-01 NOTE — SWALLOW VFSS/MBS ASSESSMENT ADULT - NS SWALLOW VFSS REC ASPIR MON
change of breathing pattern/fever/position upright (90Y)/cough/gurgly voice/pneumonia/throat clearing/oral hygiene/upper respiratory infection

## 2019-05-01 NOTE — PROGRESS NOTE ADULT - PROBLEM SELECTOR PLAN 6
- BP currently stable  - hold Losartan and Spironolactone for now given hemoptysis  - continue home dose Toprol

## 2019-05-01 NOTE — PROGRESS NOTE ADULT - ASSESSMENT
Mr. Dickens is 47 YO M with PMH sarcoidosis c/b aspergillosis (s/p voriconazole course in 11/17), HTN, Asthma, COPD, PFO, Paroxysmal AFIB (not on AC 2/2 recurrent Hemoptysis), CVA w/ L hemiplegia (9/17), Persistent Hemoptysis 2/2 DAVID Bleed s/p Thoracotomy with DAVID Lung Lobectomy on 9/24/17 s/p IR Embolization of L Bronchial Artery on 11/16/17 complicated by PTX requiring Chest Tube, s/p Tracheostomy 11/17 for Acute Hypoxic Respiratory Failure s/p Decannuation and BronchoPulmonary Fistula p/w Recurrent Hemoptysis.

## 2019-05-02 ENCOUNTER — TRANSCRIPTION ENCOUNTER (OUTPATIENT)
Age: 49
End: 2019-05-02

## 2019-05-02 LAB
ANION GAP SERPL CALC-SCNC: 12 MMO/L — SIGNIFICANT CHANGE UP (ref 7–14)
BACTERIA BLD CULT: SIGNIFICANT CHANGE UP
BACTERIA BLD CULT: SIGNIFICANT CHANGE UP
BUN SERPL-MCNC: 13 MG/DL — SIGNIFICANT CHANGE UP (ref 7–23)
CALCIUM SERPL-MCNC: 9.5 MG/DL — SIGNIFICANT CHANGE UP (ref 8.4–10.5)
CHLORIDE SERPL-SCNC: 95 MMOL/L — LOW (ref 98–107)
CO2 SERPL-SCNC: 31 MMOL/L — SIGNIFICANT CHANGE UP (ref 22–31)
CREAT SERPL-MCNC: 1.49 MG/DL — HIGH (ref 0.5–1.3)
GLUCOSE SERPL-MCNC: 116 MG/DL — HIGH (ref 70–99)
HCT VFR BLD CALC: 33.7 % — LOW (ref 39–50)
HGB BLD-MCNC: 10.1 G/DL — LOW (ref 13–17)
MCHC RBC-ENTMCNC: 27.3 PG — SIGNIFICANT CHANGE UP (ref 27–34)
MCHC RBC-ENTMCNC: 30 % — LOW (ref 32–36)
MCV RBC AUTO: 91.1 FL — SIGNIFICANT CHANGE UP (ref 80–100)
NRBC # FLD: 0 K/UL — SIGNIFICANT CHANGE UP (ref 0–0)
PLATELET # BLD AUTO: 205 K/UL — SIGNIFICANT CHANGE UP (ref 150–400)
PMV BLD: 12.2 FL — SIGNIFICANT CHANGE UP (ref 7–13)
POTASSIUM SERPL-MCNC: 4 MMOL/L — SIGNIFICANT CHANGE UP (ref 3.5–5.3)
POTASSIUM SERPL-SCNC: 4 MMOL/L — SIGNIFICANT CHANGE UP (ref 3.5–5.3)
RBC # BLD: 3.7 M/UL — LOW (ref 4.2–5.8)
RBC # FLD: 14.3 % — SIGNIFICANT CHANGE UP (ref 10.3–14.5)
SODIUM SERPL-SCNC: 138 MMOL/L — SIGNIFICANT CHANGE UP (ref 135–145)
WBC # BLD: 13.32 K/UL — HIGH (ref 3.8–10.5)
WBC # FLD AUTO: 13.32 K/UL — HIGH (ref 3.8–10.5)

## 2019-05-02 PROCEDURE — 99233 SBSQ HOSP IP/OBS HIGH 50: CPT | Mod: GC

## 2019-05-02 PROCEDURE — 36573 INSJ PICC RS&I 5 YR+: CPT

## 2019-05-02 RX ORDER — LANOLIN ALCOHOL/MO/W.PET/CERES
3 CREAM (GRAM) TOPICAL ONCE
Qty: 0 | Refills: 0 | Status: COMPLETED | OUTPATIENT
Start: 2019-05-02 | End: 2019-05-02

## 2019-05-02 RX ORDER — CHLORHEXIDINE GLUCONATE 213 G/1000ML
1 SOLUTION TOPICAL
Qty: 0 | Refills: 0 | Status: DISCONTINUED | OUTPATIENT
Start: 2019-05-02 | End: 2019-05-03

## 2019-05-02 RX ORDER — LANOLIN ALCOHOL/MO/W.PET/CERES
CREAM (GRAM) TOPICAL
Qty: 0 | Refills: 0 | Status: DISCONTINUED | OUTPATIENT
Start: 2019-05-03 | End: 2019-05-03

## 2019-05-02 RX ORDER — LANOLIN ALCOHOL/MO/W.PET/CERES
3 CREAM (GRAM) TOPICAL AT BEDTIME
Qty: 0 | Refills: 0 | Status: DISCONTINUED | OUTPATIENT
Start: 2019-05-03 | End: 2019-05-03

## 2019-05-02 RX ADMIN — PIPERACILLIN AND TAZOBACTAM 25 GRAM(S): 4; .5 INJECTION, POWDER, LYOPHILIZED, FOR SOLUTION INTRAVENOUS at 21:24

## 2019-05-02 RX ADMIN — PIPERACILLIN AND TAZOBACTAM 25 GRAM(S): 4; .5 INJECTION, POWDER, LYOPHILIZED, FOR SOLUTION INTRAVENOUS at 14:00

## 2019-05-02 RX ADMIN — Medication 3 MILLILITER(S): at 09:47

## 2019-05-02 RX ADMIN — Medication 650 MILLIGRAM(S): at 17:22

## 2019-05-02 RX ADMIN — Medication 0.5 MILLIGRAM(S): at 18:50

## 2019-05-02 RX ADMIN — Medication 25 MILLIGRAM(S): at 06:40

## 2019-05-02 RX ADMIN — Medication 3 MILLILITER(S): at 21:40

## 2019-05-02 RX ADMIN — PIPERACILLIN AND TAZOBACTAM 25 GRAM(S): 4; .5 INJECTION, POWDER, LYOPHILIZED, FOR SOLUTION INTRAVENOUS at 06:40

## 2019-05-02 RX ADMIN — Medication 5 MILLIGRAM(S): at 06:40

## 2019-05-02 RX ADMIN — Medication 3 MILLILITER(S): at 03:59

## 2019-05-02 RX ADMIN — Medication 3 MILLILITER(S): at 17:04

## 2019-05-02 NOTE — CHART NOTE - NSCHARTNOTEFT_GEN_A_CORE
PRE-INTERVENTIONAL RADIOLOGY PROCEDURE NOTE    Patient Age: 48  Patient Gender: male  Procedure (including site / side if known): single PICC  Diagnosis / Indication: pseudomonas infection  Patient and Family aware? Yes      Donna Mobley NP

## 2019-05-02 NOTE — PROGRESS NOTE ADULT - PROBLEM SELECTOR PLAN 1
- hxof hemoptysis 2/2 DAVID Bleed s/p Thoracotomy with DAVID Lung Lobectomy on 9/24/17 s/p IR emobolization of L Bronchial Artery 11/16/17 complicated by PTX requiring chest tube s/p Tracheostomy 11//17/17 for AHRF s/p Decannulation and Bronchopulmonary Fistula  - COPD Exacerbation vs PNA likely causing Hemoptysis   - sputum culture showing pseudomonas  - sensitive to zosyn  - cont for now  - will placed PICC for 2 week total abx treatment  - may need to consider chronic inhaled abx as outpatient for suppression

## 2019-05-02 NOTE — DISCHARGE NOTE NURSING/CASE MANAGEMENT/SOCIAL WORK - NSSCNAMETXT_GEN_ALL_CORE
[facility] Infusion [phone]: For Home IV Zosyn Infusions through 5/11/19.  The 1st Infusion RN Visit is for Friday 5/3/19 between 3p & 4p. The Nurse will contact you to arrange the Visit time & coordinate Delivery of Medication & Supplies.

## 2019-05-02 NOTE — PROGRESS NOTE ADULT - SUBJECTIVE AND OBJECTIVE BOX
CHIEF COMPLAINT: Patient is a 48y old  Male who presents with a chief complaint of hemoptysis (01 May 2019 07:41)    Interval Events:      REVIEW OF SYSTEMS:  Constitutional:   Eyes:  ENT:  CV:  Resp:  GI:  :  MSK:  Integumentary:  Neurological:  Psychiatric:  Endocrine:  Hematologic/Lymphatic:  Allergic/Immunologic:  [ ] All other systems negative  [ ] Unable to assess ROS because ________      OBJECTIVE:  ICU Vital Signs Last 24 Hrs  T(C): 37.8 (02 May 2019 06:39), Max: 37.8 (02 May 2019 06:39)  T(F): 100.1 (02 May 2019 06:39), Max: 100.1 (02 May 2019 06:39)  HR: 132 (02 May 2019 06:39) (103 - 132)  BP: 101/59 (02 May 2019 06:39) (101/59 - 135/85)  BP(mean): --  ABP: --  ABP(mean): --  RR: 24 (02 May 2019 06:39) (20 - 24)  SpO2: 96% (02 May 2019 06:39) (91% - 96%)    05- @ 07:01  -  05-02 @ 07:00  --------------------------------------------------------  IN: 500 mL / OUT: 700 mL / NET: -200 mL    HOSPITAL MEDICATIONS:  MEDICATIONS  (STANDING):  ALBUTerol/ipratropium for Nebulization 3 milliLiter(s) Nebulizer every 6 hours  influenza   Vaccine 0.5 milliLiter(s) IntraMuscular once  metoprolol succinate ER 25 milliGRAM(s) Oral daily  piperacillin/tazobactam IVPB. 3.375 Gram(s) IV Intermittent every 8 hours  predniSONE   Tablet 5 milliGRAM(s) Oral daily    MEDICATIONS  (PRN):  acetaminophen   Tablet .. 650 milliGRAM(s) Oral every 6 hours PRN Temp greater or equal to 38C (100.4F), Moderate Pain (4 - 6)  guaiFENesin    Syrup 100 milliGRAM(s) Oral every 6 hours PRN Cough  LORazepam     Tablet 0.5 milliGRAM(s) Oral two times a day PRN Anxiety      LABS:                        10.1   13.32 )-----------( 205      ( 02 May 2019 06:05 )             33.7     05-02    138  |  95<L>  |  13  ----------------------------<  116<H>  4.0   |  31  |  1.49<H>    Ca    9.5      02 May 2019 06:05        Urinalysis Basic - ( 2019 18:35 )    Color: YELLOW / Appearance: Lt TURBID / S.020 / pH: 6.0  Gluc: NEGATIVE / Ketone: NEGATIVE  / Bili: NEGATIVE / Urobili: NORMAL   Blood: MODERATE / Protein: 50 / Nitrite: NEGATIVE   Leuk Esterase: NEGATIVE / RBC: 25-50 / WBC 6-10   Sq Epi: FEW / Non Sq Epi: x / Bacteria: NEGATIVE            MICROBIOLOGY:     RADIOLOGY:  [ ] Reviewed and interpreted by me    PULMONARY FUNCTION TESTS:    EKG: CHIEF COMPLAINT: Patient is a 48y old  Male who presents with a chief complaint of hemoptysis (01 May 2019 07:41)    Interval Events: none overnight      REVIEW OF SYSTEMS:  Constitutional: no complaints. feels ok  CV: denies  Resp: less sputum - denies SOB  GI: denies  [x] All other systems negative  [ ] Unable to assess ROS because ________      OBJECTIVE:  ICU Vital Signs Last 24 Hrs  T(C): 37.8 (02 May 2019 06:39), Max: 37.8 (02 May 2019 06:39)  T(F): 100.1 (02 May 2019 06:39), Max: 100.1 (02 May 2019 06:39)  HR: 132 (02 May 2019 06:39) (103 - 132)  BP: 101/59 (02 May 2019 06:39) (101/59 - 135/85)  BP(mean): --  ABP: --  ABP(mean): --  RR: 24 (02 May 2019 06:39) (20 - 24)  SpO2: 96% (02 May 2019 06:39) (91% - 96%)    05-01 @ 07:01  -  05-02 @ 07:00  --------------------------------------------------------  IN: 500 mL / OUT: 700 mL / NET: -200 mL    HOSPITAL MEDICATIONS:  MEDICATIONS  (STANDING):  ALBUTerol/ipratropium for Nebulization 3 milliLiter(s) Nebulizer every 6 hours  influenza   Vaccine 0.5 milliLiter(s) IntraMuscular once  metoprolol succinate ER 25 milliGRAM(s) Oral daily  piperacillin/tazobactam IVPB. 3.375 Gram(s) IV Intermittent every 8 hours  predniSONE   Tablet 5 milliGRAM(s) Oral daily    MEDICATIONS  (PRN):  acetaminophen   Tablet .. 650 milliGRAM(s) Oral every 6 hours PRN Temp greater or equal to 38C (100.4F), Moderate Pain (4 - 6)  guaiFENesin    Syrup 100 milliGRAM(s) Oral every 6 hours PRN Cough  LORazepam     Tablet 0.5 milliGRAM(s) Oral two times a day PRN Anxiety      LABS:                        10.1   13.32 )-----------( 205      ( 02 May 2019 06:05 )             33.7     05-02    138  |  95<L>  |  13  ----------------------------<  116<H>  4.0   |  31  |  1.49<H>    Ca    9.5      02 May 2019 06:05        MICROBIOLOGY:     Culture - Respiratory with Gram Stain (04.28.19 @ 19:26)    -  Amikacin: S <=8 FELIX    Gram Stain Sputum:   GNR Gram Neg Rods  QUANTITY OF BACTERIA SEEN: RARE (1+)  GPCPR^Gram Pos Cocci in Pairs  QUANTITY OF BACTERIA SEEN: RARE (1+)  WBC^White Blood Cells  QNTY CELLS IN GRAM STAIN: RARE (1+)    -  Aztreonam: S <=4 FELIX    -  Cefepime: S 8 FELIX    Culture - Respiratory:   PSA^Pseudomonas aeruginosa  QUANTITY OF GROWTH: MODERATE    Culture - Respiratory:   Normal Respiratory Katherine Also Present    -  Tobramycin: S <=2 FELIX    -  Imipenem: S <=1 FELIX    -  Levofloxacin: R >4 FELIX    -  Meropenem: S <=1 FELIX    -  Piperacillin/Tazobactam: S    -  Gentamicin: S <=1 FELIX    -  Ceftazidime: S 4 FELIX    -  Ciprofloxacin: R >2 FELIX    Specimen Source: SPUTUM    Organism Identification: Pseudomonas aeruginosa    Organism: Pseudomonas aeruginosa  QUANTITY OF GROWTH: MODERATE    Method Type: NEGATIVE FELIX 43

## 2019-05-02 NOTE — DISCHARGE NOTE NURSING/CASE MANAGEMENT/SOCIAL WORK - NSDCPECAREGIVERED_GEN_ALL_CORE
Patient and caregiver educated on medication, infection prevention and on how to care for PICC line/Yes

## 2019-05-02 NOTE — PROGRESS NOTE ADULT - PROBLEM SELECTOR PROBLEM 3
JERRI (acute kidney injury)

## 2019-05-02 NOTE — DISCHARGE NOTE NURSING/CASE MANAGEMENT/SOCIAL WORK - NSDCDPATPORTLINK_GEN_ALL_CORE
You can access the [facility] Patient Portal, offered by [facility], by registering with the following website: [email or web address]

## 2019-05-02 NOTE — DISCHARGE NOTE NURSING/CASE MANAGEMENT/SOCIAL WORK - NSDCPNINST_GEN_ALL_CORE
Medication education given to patient and significant other. Instructed about PICC line care and how to reduce infection. Reading material given. The area around your catheter may get infected, or you may get an infection in your bloodstream. A bloodstream infection is called a central line-associated bloodstream infection (CLABSI). A CLABSI is caused by bacteria getting into your bloodstream through your catheter. This can lead to severe illness. The following are ways you can help prevent a CLABSI:   •Wash your hands often. Use soap or an alcohol-based hand rub to clean your hands. Clean your hands before and after you touch the catheter or the catheter site. Remind anyone who cares for your catheter to wash their hands. The following are ways you can help prevent a central line associated infection  •Wash your hands often. Use soap or an alcohol-based hand rub to clean your hands. Clean your hands before and after you touch the catheter or the catheter site. Remind anyone who cares for your catheter to wash their hands.

## 2019-05-03 VITALS — WEIGHT: 171.96 LBS

## 2019-05-03 LAB
ANION GAP SERPL CALC-SCNC: 11 MMO/L — SIGNIFICANT CHANGE UP (ref 7–14)
BUN SERPL-MCNC: 12 MG/DL — SIGNIFICANT CHANGE UP (ref 7–23)
CALCIUM SERPL-MCNC: 9.2 MG/DL — SIGNIFICANT CHANGE UP (ref 8.4–10.5)
CHLORIDE SERPL-SCNC: 93 MMOL/L — LOW (ref 98–107)
CO2 SERPL-SCNC: 33 MMOL/L — HIGH (ref 22–31)
CREAT SERPL-MCNC: 1.32 MG/DL — HIGH (ref 0.5–1.3)
GLUCOSE SERPL-MCNC: 105 MG/DL — HIGH (ref 70–99)
HCT VFR BLD CALC: 31.9 % — LOW (ref 39–50)
HGB BLD-MCNC: 9.4 G/DL — LOW (ref 13–17)
MAGNESIUM SERPL-MCNC: 2 MG/DL — SIGNIFICANT CHANGE UP (ref 1.6–2.6)
MCHC RBC-ENTMCNC: 27 PG — SIGNIFICANT CHANGE UP (ref 27–34)
MCHC RBC-ENTMCNC: 29.5 % — LOW (ref 32–36)
MCV RBC AUTO: 91.7 FL — SIGNIFICANT CHANGE UP (ref 80–100)
NRBC # FLD: 0 K/UL — SIGNIFICANT CHANGE UP (ref 0–0)
PLATELET # BLD AUTO: 179 K/UL — SIGNIFICANT CHANGE UP (ref 150–400)
PMV BLD: 12.2 FL — SIGNIFICANT CHANGE UP (ref 7–13)
POTASSIUM SERPL-MCNC: 5 MMOL/L — SIGNIFICANT CHANGE UP (ref 3.5–5.3)
POTASSIUM SERPL-SCNC: 5 MMOL/L — SIGNIFICANT CHANGE UP (ref 3.5–5.3)
RBC # BLD: 3.48 M/UL — LOW (ref 4.2–5.8)
RBC # FLD: 14.5 % — SIGNIFICANT CHANGE UP (ref 10.3–14.5)
SODIUM SERPL-SCNC: 137 MMOL/L — SIGNIFICANT CHANGE UP (ref 135–145)
WBC # BLD: 15.28 K/UL — HIGH (ref 3.8–10.5)
WBC # FLD AUTO: 15.28 K/UL — HIGH (ref 3.8–10.5)

## 2019-05-03 PROCEDURE — 99238 HOSP IP/OBS DSCHRG MGMT 30/<: CPT | Mod: GC

## 2019-05-03 PROCEDURE — 93010 ELECTROCARDIOGRAM REPORT: CPT

## 2019-05-03 RX ORDER — SODIUM CHLORIDE 9 MG/ML
500 INJECTION INTRAMUSCULAR; INTRAVENOUS; SUBCUTANEOUS ONCE
Qty: 0 | Refills: 0 | Status: COMPLETED | OUTPATIENT
Start: 2019-05-03 | End: 2019-05-03

## 2019-05-03 RX ORDER — LOSARTAN POTASSIUM 100 MG/1
1 TABLET, FILM COATED ORAL
Qty: 0 | Refills: 0 | COMMUNITY

## 2019-05-03 RX ORDER — PIPERACILLIN AND TAZOBACTAM 4; .5 G/20ML; G/20ML
3.38 INJECTION, POWDER, LYOPHILIZED, FOR SOLUTION INTRAVENOUS
Qty: 0 | Refills: 0 | COMMUNITY

## 2019-05-03 RX ADMIN — Medication 3 MILLILITER(S): at 09:42

## 2019-05-03 RX ADMIN — CHLORHEXIDINE GLUCONATE 1 APPLICATION(S): 213 SOLUTION TOPICAL at 11:42

## 2019-05-03 RX ADMIN — Medication 5 MILLIGRAM(S): at 05:57

## 2019-05-03 RX ADMIN — Medication 25 MILLIGRAM(S): at 05:57

## 2019-05-03 RX ADMIN — SODIUM CHLORIDE 500 MILLILITER(S): 9 INJECTION INTRAMUSCULAR; INTRAVENOUS; SUBCUTANEOUS at 01:30

## 2019-05-03 RX ADMIN — PIPERACILLIN AND TAZOBACTAM 25 GRAM(S): 4; .5 INJECTION, POWDER, LYOPHILIZED, FOR SOLUTION INTRAVENOUS at 05:57

## 2019-05-03 RX ADMIN — Medication 3 MILLILITER(S): at 04:39

## 2019-05-03 NOTE — PROGRESS NOTE ADULT - [facility] HE PE MLT D E PC
no rub/regular rate and rhythm/no murmur
no rub/no murmur/regular rate and rhythm
no murmur/no rub/regular rate and rhythm
regular rate and rhythm

## 2019-05-03 NOTE — PROGRESS NOTE ADULT - RS GEN PE MLT RESP DETAILS PC
airway patent/breath sounds equal/good air movement
airway patent/breath sounds equal/good air movement
airway patent/breath sounds equal
airway patent
breath sounds equal/normal/airway patent

## 2019-05-03 NOTE — PROVIDER CONTACT NOTE (OTHER) - ASSESSMENT
Pt dyspneic with hr (on cont pulse ox) sustaining in 130s to 140s since neb tx. 98.2 temp. SIgnificant other indicated that this is a common occurrence particularly post neb tx and had occurred similarly last night, stating it was not out of ordinary.

## 2019-05-03 NOTE — PROGRESS NOTE ADULT - SUBJECTIVE AND OBJECTIVE BOX
CHIEF COMPLAINT: Patient is a 48y old  Male who presents with a chief complaint of hemoptysis (02 May 2019 09:10)    Interval Events:      REVIEW OF SYSTEMS:  Constitutional:   Eyes:  ENT:  CV:  Resp:  GI:  :  MSK:  Integumentary:  Neurological:  Psychiatric:  Endocrine:  Hematologic/Lymphatic:  Allergic/Immunologic:  [ ] All other systems negative  [ ] Unable to assess ROS because ________      OBJECTIVE:  ICU Vital Signs Last 24 Hrs  T(C): 36.8 (03 May 2019 05:55), Max: 37.1 (02 May 2019 21:22)  T(F): 98.2 (03 May 2019 05:55), Max: 98.8 (02 May 2019 21:22)  HR: 121 (03 May 2019 05:55) (108 - 140)  BP: 114/90 (03 May 2019 05:55) (92/60 - 114/90)  BP(mean): --  ABP: --  ABP(mean): --  RR: 24 (03 May 2019 05:55) (24 - 24)  SpO2: 93% (03 May 2019 05:55) (93% - 98%)    05-02 @ 07:01  -  05-03 @ 07:00  --------------------------------------------------------  IN: 840 mL / OUT: 800 mL / NET: 40 mL    HOSPITAL MEDICATIONS:  MEDICATIONS  (STANDING):  ALBUTerol/ipratropium for Nebulization 3 milliLiter(s) Nebulizer every 6 hours  chlorhexidine 4% Liquid 1 Application(s) Topical <User Schedule>  influenza   Vaccine 0.5 milliLiter(s) IntraMuscular once  melatonin      melatonin 3 milliGRAM(s) Oral at bedtime  metoprolol succinate ER 25 milliGRAM(s) Oral daily  piperacillin/tazobactam IVPB. 3.375 Gram(s) IV Intermittent every 8 hours  predniSONE   Tablet 5 milliGRAM(s) Oral daily    MEDICATIONS  (PRN):  acetaminophen   Tablet .. 650 milliGRAM(s) Oral every 6 hours PRN Temp greater or equal to 38C (100.4F), Moderate Pain (4 - 6)  guaiFENesin    Syrup 100 milliGRAM(s) Oral every 6 hours PRN Cough  LORazepam     Tablet 0.5 milliGRAM(s) Oral two times a day PRN Anxiety      LABS:                        9.4    15.28 )-----------( 179      ( 03 May 2019 04:03 )             31.9     05-03    137  |  93<L>  |  12  ----------------------------<  105<H>  5.0   |  33<H>  |  1.32<H>    Ca    9.2      03 May 2019 04:03  Mg     2.0     05-03                MICROBIOLOGY:     RADIOLOGY:  [ ] Reviewed and interpreted by me    PULMONARY FUNCTION TESTS:    EKG: CHIEF COMPLAINT: Patient is a 48y old  Male who presents with a chief complaint of hemoptysis (02 May 2019 09:10)    Interval Events: afebrile, no events overnight      REVIEW OF SYSTEMS:  Constitutional: no complaints  CV: denies  Resp: denies  GI: denies  [x] All other systems negative  [ ] Unable to assess ROS because ________      OBJECTIVE:  ICU Vital Signs Last 24 Hrs  T(C): 36.8 (03 May 2019 05:55), Max: 37.1 (02 May 2019 21:22)  T(F): 98.2 (03 May 2019 05:55), Max: 98.8 (02 May 2019 21:22)  HR: 121 (03 May 2019 05:55) (108 - 140)  BP: 114/90 (03 May 2019 05:55) (92/60 - 114/90)  BP(mean): --  ABP: --  ABP(mean): --  RR: 24 (03 May 2019 05:55) (24 - 24)  SpO2: 93% (03 May 2019 05:55) (93% - 98%)    05-02 @ 07:01  -  05-03 @ 07:00  --------------------------------------------------------  IN: 840 mL / OUT: 800 mL / NET: 40 mL    HOSPITAL MEDICATIONS:  MEDICATIONS  (STANDING):  ALBUTerol/ipratropium for Nebulization 3 milliLiter(s) Nebulizer every 6 hours  chlorhexidine 4% Liquid 1 Application(s) Topical <User Schedule>  influenza   Vaccine 0.5 milliLiter(s) IntraMuscular once  melatonin      melatonin 3 milliGRAM(s) Oral at bedtime  metoprolol succinate ER 25 milliGRAM(s) Oral daily  piperacillin/tazobactam IVPB. 3.375 Gram(s) IV Intermittent every 8 hours  predniSONE   Tablet 5 milliGRAM(s) Oral daily    MEDICATIONS  (PRN):  acetaminophen   Tablet .. 650 milliGRAM(s) Oral every 6 hours PRN Temp greater or equal to 38C (100.4F), Moderate Pain (4 - 6)  guaiFENesin    Syrup 100 milliGRAM(s) Oral every 6 hours PRN Cough  LORazepam     Tablet 0.5 milliGRAM(s) Oral two times a day PRN Anxiety      LABS:                        9.4    15.28 )-----------( 179      ( 03 May 2019 04:03 )             31.9     05-03    137  |  93<L>  |  12  ----------------------------<  105<H>  5.0   |  33<H>  |  1.32<H>    Ca    9.2      03 May 2019 04:03  Mg     2.0     05-03      MICROBIOLOGY:     Culture - Blood (04.30.19 @ 17:27)    Culture - Blood:   NO ORGANISMS ISOLATED  NO ORGANISMS ISOLATED AT 48 HRS.    Specimen Source: BLOOD PERIPHERAL    Culture - Respiratory with Gram Stain (04.28.19 @ 19:26)    -  Levofloxacin: R >4 FELIX    -  Meropenem: S <=1 FELIX    Culture - Respiratory:   PSA^Pseudomonas aeruginosa  QUANTITY OF GROWTH: MODERATE    Culture - Respiratory:   Normal Respiratory Katherine Also Present    -  Imipenem: S <=1 FELIX    -  Gentamicin: S <=1 FELIX    -  Tobramycin: S <=2 FELIX    -  Piperacillin/Tazobactam: S    -  Ciprofloxacin: R >2 FELIX    -  Ceftazidime: S 4 FELIX    -  Cefepime: S 8 FELIX    -  Amikacin: S <=8 FELIX    -  Aztreonam: S <=4 FELIX    Gram Stain Sputum:   GNR Gram Neg Rods  QUANTITY OF BACTERIA SEEN: RARE (1+)  GPCPR^Gram Pos Cocci in Pairs  QUANTITY OF BACTERIA SEEN: RARE (1+)  WBC^White Blood Cells  QNTY CELLS IN GRAM STAIN: RARE (1+)    Specimen Source: SPUTUM    Organism Identification: Pseudomonas aeruginosa    Organism: Pseudomonas aeruginosa  QUANTITY OF GROWTH: MODERATE    Method Type: NEGATIVE FELIX 43

## 2019-05-03 NOTE — PROGRESS NOTE ADULT - MS EXT PE MLT D E PC
no pedal edema/no clubbing/no cyanosis
no cyanosis/no pedal edema/no clubbing
no pedal edema/no clubbing/no cyanosis
normal

## 2019-05-03 NOTE — PROGRESS NOTE ADULT - NEUROLOGICAL DETAILS
responds to verbal commands/alert and oriented x 3/responds to pain
responds to verbal commands/responds to pain/alert and oriented x 3
responds to verbal commands/alert and oriented x 3/responds to pain

## 2019-05-03 NOTE — PROGRESS NOTE ADULT - GASTROINTESTINAL DETAILS
no distention/soft/nontender
soft/nontender/no distention
no distention/nontender/soft
no distention/normal/nontender/soft

## 2019-05-03 NOTE — PROGRESS NOTE ADULT - ATTENDING COMMENTS
Feeling better  Awaiting sens of pseudomonas
doing well  but pseud now resist to quinolone  plan for picc, iv abx - zosyn  outpt appt already scheduled with pulm next week. consider inhaled abx
49 y/o M w/chronic hypercapnic respiratory failure secondary to combination of sarcoidosis and COPD, bullous lung disease, Afib not on AC due to hemoptysis, prior episodes of hemoptysis s/p DAVID lobectomy and embolization of L bronchial artery presenting with recurrence of hemoptysis and increased cough and secretions. Likely secondary to PNA, Patient with fever and increased WBC count, however CXR without significant change from prior Xray. Reports hemoptysis is decreasing since starting on antibiotics last night.     - 7 day course of abx  - If worsening of hemoptysis would get CT chest and possible bronchoscopy  - Cultures  - Continue home dose of prednisone  - Bronchodilators  - Speech and swallow eval
stable for d/c  outpt follow up with pulm scheduled for next week
Pt known to us.  Admitted 4/27 - fever, sputum with blood mixed in - from the not-yet closed stoma and mouth, leukocytosis.  Started on vanco/zosyn.  Doing better. hemopytsis resolved. afebrile.  sputum cx pending
No further hemoptysis  Afebrile  Leukocytosis improving.  Sputum with pseud. No staph  Cont zosyn, d/c vanco. f/u sens  Anxiety, has done well with ativan in the past, will restart

## 2019-05-03 NOTE — DIETITIAN INITIAL EVALUATION ADULT. - PROBLEM SELECTOR PLAN 2
- COPD exacerbation leading to increased coughing and hemoptysis?  - Duoneb q6h.   - Will discuss with pulm regarding further steroid treatment.

## 2019-05-03 NOTE — DIETITIAN INITIAL EVALUATION ADULT. - OTHER INFO
Nutrition assessment initiated for LOS. Pt. alert, oriented, reports fair to good appetite, PO nutrition, no recent wt. changes , no difficulty chewing/ swallowing / no food allergies, taking > 75% of the melas he likes .

## 2019-05-05 LAB — BACTERIA BLD CULT: SIGNIFICANT CHANGE UP

## 2019-05-07 ENCOUNTER — APPOINTMENT (OUTPATIENT)
Dept: PULMONOLOGY | Facility: CLINIC | Age: 49
End: 2019-05-07
Payer: MEDICAID

## 2019-05-07 VITALS
SYSTOLIC BLOOD PRESSURE: 133 MMHG | BODY MASS INDEX: 25.94 KG/M2 | RESPIRATION RATE: 18 BRPM | HEART RATE: 105 BPM | WEIGHT: 186 LBS | TEMPERATURE: 97.5 F | OXYGEN SATURATION: 91 % | DIASTOLIC BLOOD PRESSURE: 95 MMHG

## 2019-05-07 DIAGNOSIS — J18.9 PNEUMONIA, UNSPECIFIED ORGANISM: ICD-10-CM

## 2019-05-07 PROCEDURE — 99214 OFFICE O/P EST MOD 30 MIN: CPT

## 2019-05-07 RX ORDER — PREDNISONE 10 MG/1
10 TABLET ORAL
Qty: 60 | Refills: 1 | Status: ACTIVE | COMMUNITY
Start: 2019-05-07 | End: 1900-01-01

## 2019-05-09 NOTE — PHYSICAL EXAM
[General Appearance - In No Acute Distress] : no acute distress [Normal Oropharynx] : normal oropharynx [] : no respiratory distress [Exaggerated Use Of Accessory Muscles For Inspiration] : no accessory muscle use [PICC] : PICC [FreeTextEntry1] : uses wheelchair [Single] : it is a single lumen catheter [Right] : right [Arm] : arm [Discharge] : no discharge [Redness] : no redness [Tenderness] : no tenderness [Swelling] : no swelling [Skin Color & Pigmentation] : normal skin color and pigmentation [Excoriation] : no excoriation of surrounding skin [No Focal Deficits] : no focal deficits [Affect] : the affect was normal [Oriented To Time, Place, And Person] : oriented to person, place, and time [Mood] : the mood was normal

## 2019-05-09 NOTE — DISCUSSION/SUMMARY
[FreeTextEntry1] : 48 year old man with pulmonary fibrosis secondary to sarcoid, s/p DAVID resection secondary to hemoptysis caused by aspergillus, s/p decannulation after having tracheostomy for more than a year, COPD here for follow up after recent admission for pseudomonas pneumonia also had JERRI during his admission.\par \par no fevers, decreased sputum production still using his nebulizer frequently\par continue iv ABx scheduled to finish course 5/10\par has had pneumonia twice in one year if repeated may need inhaled ABx\par will get blood draw at home to follow up JERRI\par continue nebulizer treatments and Aerobika to help clear secretions, has to cover stoma when using Aerobika\par follow up in 2-3 weeks

## 2019-05-09 NOTE — HISTORY OF PRESENT ILLNESS
[FreeTextEntry1] : 48 year old man with sarcoidosis, pulmonary fibrosis, hx/o aspergilloma s/p DAVID lobectomy secondary to hemoptysis here for follow up after recent admission for pneumonia.\par The patient was admitted beginning of April with pneumonia, sputum positive for pseudomonas was discharged but got readmitted two weeks later with worsening symptoms and was found to have pseudomonas with different resistance pattern. Was discharged with PICC line and iv antibiotics.\par \par He is here with his girlfriend, they both states that he has been having bouts of shortness of breath that improves with nebulizers. He is still getting iv ABx at home. He has not been using his airway clearance device.\par \par Currently he is using his nebulizer every 4 hours and Lonhala twice daily. s/p decannulation but continues to have discharge from the tracheostomy site.

## 2019-06-22 ENCOUNTER — RX RENEWAL (OUTPATIENT)
Age: 49
End: 2019-06-22

## 2019-06-22 RX ORDER — ALBUTEROL SULFATE 2.5 MG/3ML
(2.5 MG/3ML) SOLUTION RESPIRATORY (INHALATION)
Qty: 300 | Refills: 2 | Status: ACTIVE | COMMUNITY
Start: 2019-05-07 | End: 1900-01-01

## 2019-06-29 ENCOUNTER — INPATIENT (INPATIENT)
Facility: HOSPITAL | Age: 49
LOS: 7 days | End: 2019-07-07
Attending: HOSPITALIST | Admitting: HOSPITALIST
Payer: MEDICAID

## 2019-06-29 VITALS
TEMPERATURE: 98 F | SYSTOLIC BLOOD PRESSURE: 128 MMHG | HEART RATE: 110 BPM | RESPIRATION RATE: 18 BRPM | WEIGHT: 192.24 LBS | OXYGEN SATURATION: 94 % | HEIGHT: 71 IN | DIASTOLIC BLOOD PRESSURE: 76 MMHG

## 2019-06-29 DIAGNOSIS — Z98.890 OTHER SPECIFIED POSTPROCEDURAL STATES: Chronic | ICD-10-CM

## 2019-06-29 DIAGNOSIS — R04.2 HEMOPTYSIS: ICD-10-CM

## 2019-06-29 LAB
ANION GAP SERPL CALC-SCNC: 8 MMO/L — SIGNIFICANT CHANGE UP (ref 7–14)
BUN SERPL-MCNC: 27 MG/DL — HIGH (ref 7–23)
CALCIUM SERPL-MCNC: 10.6 MG/DL — HIGH (ref 8.4–10.5)
CHLORIDE SERPL-SCNC: 91 MMOL/L — LOW (ref 98–107)
CO2 SERPL-SCNC: 43 MMOL/L — HIGH (ref 22–31)
CREAT SERPL-MCNC: 0.89 MG/DL — SIGNIFICANT CHANGE UP (ref 0.5–1.3)
GLUCOSE SERPL-MCNC: 130 MG/DL — HIGH (ref 70–99)
HCT VFR BLD CALC: 37.3 % — LOW (ref 39–50)
HGB BLD-MCNC: 10.5 G/DL — LOW (ref 13–17)
MCHC RBC-ENTMCNC: 26.1 PG — LOW (ref 27–34)
MCHC RBC-ENTMCNC: 28.2 % — LOW (ref 32–36)
MCV RBC AUTO: 92.8 FL — SIGNIFICANT CHANGE UP (ref 80–100)
NRBC # FLD: 0 K/UL — SIGNIFICANT CHANGE UP (ref 0–0)
PLATELET # BLD AUTO: 197 K/UL — SIGNIFICANT CHANGE UP (ref 150–400)
PMV BLD: 12.3 FL — SIGNIFICANT CHANGE UP (ref 7–13)
POTASSIUM SERPL-MCNC: 4.4 MMOL/L — SIGNIFICANT CHANGE UP (ref 3.5–5.3)
POTASSIUM SERPL-SCNC: 4.4 MMOL/L — SIGNIFICANT CHANGE UP (ref 3.5–5.3)
RBC # BLD: 4.02 M/UL — LOW (ref 4.2–5.8)
RBC # FLD: 13.9 % — SIGNIFICANT CHANGE UP (ref 10.3–14.5)
SODIUM SERPL-SCNC: 142 MMOL/L — SIGNIFICANT CHANGE UP (ref 135–145)
WBC # BLD: 13.22 K/UL — HIGH (ref 3.8–10.5)
WBC # FLD AUTO: 13.22 K/UL — HIGH (ref 3.8–10.5)

## 2019-06-29 PROCEDURE — 99223 1ST HOSP IP/OBS HIGH 75: CPT | Mod: GC

## 2019-06-29 PROCEDURE — 99233 SBSQ HOSP IP/OBS HIGH 50: CPT

## 2019-06-29 RX ORDER — DOCUSATE SODIUM 100 MG
100 CAPSULE ORAL
Refills: 0 | Status: DISCONTINUED | OUTPATIENT
Start: 2019-06-29 | End: 2019-07-02

## 2019-06-29 RX ORDER — CHLORHEXIDINE GLUCONATE 213 G/1000ML
1 SOLUTION TOPICAL
Refills: 0 | Status: DISCONTINUED | OUTPATIENT
Start: 2019-06-29 | End: 2019-07-07

## 2019-06-29 RX ORDER — CEFEPIME 1 G/1
2000 INJECTION, POWDER, FOR SOLUTION INTRAMUSCULAR; INTRAVENOUS EVERY 8 HOURS
Refills: 0 | Status: DISCONTINUED | OUTPATIENT
Start: 2019-06-29 | End: 2019-06-30

## 2019-06-29 RX ORDER — IPRATROPIUM/ALBUTEROL SULFATE 18-103MCG
3 AEROSOL WITH ADAPTER (GRAM) INHALATION EVERY 6 HOURS
Refills: 0 | Status: DISCONTINUED | OUTPATIENT
Start: 2019-06-29 | End: 2019-06-30

## 2019-06-29 RX ORDER — SENNA PLUS 8.6 MG/1
2 TABLET ORAL AT BEDTIME
Refills: 0 | Status: DISCONTINUED | OUTPATIENT
Start: 2019-06-29 | End: 2019-07-02

## 2019-06-29 RX ORDER — VANCOMYCIN HCL 1 G
1250 VIAL (EA) INTRAVENOUS EVERY 12 HOURS
Refills: 0 | Status: DISCONTINUED | OUTPATIENT
Start: 2019-06-29 | End: 2019-06-30

## 2019-06-29 RX ADMIN — CEFEPIME 100 MILLIGRAM(S): 1 INJECTION, POWDER, FOR SOLUTION INTRAMUSCULAR; INTRAVENOUS at 22:13

## 2019-06-29 RX ADMIN — CEFEPIME 100 MILLIGRAM(S): 1 INJECTION, POWDER, FOR SOLUTION INTRAMUSCULAR; INTRAVENOUS at 15:53

## 2019-06-29 RX ADMIN — Medication 3 MILLILITER(S): at 15:56

## 2019-06-29 RX ADMIN — Medication 40 MILLIGRAM(S): at 15:36

## 2019-06-29 RX ADMIN — Medication 40 MILLIGRAM(S): at 22:13

## 2019-06-29 RX ADMIN — Medication 166.67 MILLIGRAM(S): at 17:23

## 2019-06-29 RX ADMIN — SENNA PLUS 2 TABLET(S): 8.6 TABLET ORAL at 22:14

## 2019-06-29 RX ADMIN — Medication 100 MILLIGRAM(S): at 17:23

## 2019-06-29 RX ADMIN — CHLORHEXIDINE GLUCONATE 1 APPLICATION(S): 213 SOLUTION TOPICAL at 22:13

## 2019-06-29 RX ADMIN — Medication 3 MILLILITER(S): at 21:23

## 2019-06-29 NOTE — H&P ADULT - NSHPREVIEWOFSYSTEMS_GEN_ALL_CORE
Constitutional: [ ] Fever, [ ] Chills, [ ] Fatigue, [ ]Weight change   HEENT: [ ]Blurred vision,  [ ]Headache, [ ] Vision Changes    Respiratory: [ ]Cough, [ ]Wheezing, [ ] Shortness of breath, [ ] Hemoptysis   Cardiovascular: [ ]Chest Pain, [ ]Palpitations, [ ]MEYER, [ ]PND, [ ] Orthopnea  Gastrointestinal: [ ]Abdominal Pain, [ ]Diarrhea, [ ]Constipation, [ ] Nausea, [ ] Vomiting  Extremities: [ ]Swelling, [ ] Joint Pain  Neurologic: [ ]Focal deficit, [ ]Paresthesias, [ ]Syncope  Skin: [ ]Rash, [ ]Ecchymoses, [ ] Wounds, [ ]Lesions

## 2019-06-29 NOTE — CHART NOTE - NSCHARTNOTEFT_GEN_A_CORE
Spoke to Dr. Ferguson at [facility]- pt had a positive groin swab for Candida auris at a previous hospital stay at [facility]. Pt underwent testing this admission but the results are not back yet.

## 2019-06-29 NOTE — PROVIDER CONTACT NOTE (OTHER) - REASON
Notified by [facility] that the patient was on contact precautions for Candida Auris

## 2019-06-29 NOTE — H&P ADULT - NSHPPHYSICALEXAM_GEN_ALL_CORE
Appearance: Normal, NAD  Eyes: PERRL, EOMI  HENT: Normal oral muscosa, NC/AT  Stoma Site: Dried blood around exterior of site.   Cardiovascular:  S1/S2, RRR, No edema, JVP, Murmur  Respiratory: Clear to auscultation bilaterally  Gastrointestinal:  Soft, Non-tender, Non-distended, BS+  Musculoskeletal:  No clubbing [ ] No joint deformity   Neurologic: Non-focal  Lymphatic: No lymphadenopathy  Psychiatry: AAOx3, Mood & affect appropriate  Skin: No rashes, No ecchymoses, No cyanosis

## 2019-06-29 NOTE — H&P ADULT - HISTORY OF PRESENT ILLNESS
48 year old man with sarcoidosis, pulmonary fibrosis, hx/o aspergilloma s/p DAVID lobectomy secondary to hemoptysis transferred from [facility] for bleeding at [facility] stoma. Pt had trach inserted as a part of a complicated post op course following Lobectomy. Pt admitted to OSH with concerns for sepsis, was started on Vanco/Cefepime. Pt was remained hemodynimcally stable throughout stay with exception of some tachycardia. Pt was seen by general surgery and the tracheal stoma was cauterized using silver nitrate. Pt was transferred to [facility] for surgical evaluation of bleeding.  Of note- when patient was received at [facility], EMS stated pt was on isolation for a positive Candida auris test. Called [facility] and spoke to the nurse previosuly taking care of patient- patient did indeed have a positive Candida auris swab from ether the groin, axilla or king.  Attempted to reach attending regarding more details, awaiting callback.

## 2019-06-29 NOTE — PROGRESS NOTE ADULT - SUBJECTIVE AND OBJECTIVE BOX
GINNAOSCAR  MRN-[id number]    Patient is a 48y old  Male who presents with a chief complaint of Bleeding from tracheal stoma (29 Jun 2019 14:15)    HPI:  48 year old man with sarcoidosis, pulmonary fibrosis, aspergilloma s/p DAVID lobectomy secondary to hemoptysis, respiratory failure s/p tracheotomy now decannulated was transferred from [facility] for bleeding at trach stoma. Per history the patient had trach inserted as a part of a complicated post op course following lobectomy and was decannulated approximately 2 years ago. The patient was admitted to OSH with concerns for sepsis and was started on Vanco/Cefepime. He remained hemodynamically stable throughout stay with exception of some tachycardia. He was seen by general surgery and the tracheal stoma was cauterized using silver nitrate. The patient was transferred to [facility] for surgical evaluation of bleeding. He also had a swab positive for candida auris prior to transfer (site unknown). He complains of dyspnea and hoarseness currently.     PAST MEDICAL & SURGICAL HISTORY:  History of pneumothorax: History of 3 prior pneumonthoracies.  COPD (chronic obstructive pulmonary disease)  Asthma  Hypertension  Sarcoidosis  History of thoracotomy: 9/24/17 Left thoracotomy, Left upper lobectomy   9/25/17 Reop left thoracotomy, evacuation of left hemothorax    FAMILY HISTORY:  Family history of pancreatic cancer  Family history of essential hypertension    Social History:  Denies smoking, illicit drug use or frequent alcohol consumption    Allergies  No Known Allergies    MEDICATIONS  (STANDING):  ALBUTerol/ipratropium for Nebulization. 3 milliLiter(s) Nebulizer every 6 hours  cefepime   IVPB 2000 milliGRAM(s) IV Intermittent every 8 hours  chlorhexidine 4% Liquid 1 Application(s) Topical <User Schedule>  docusate sodium 100 milliGRAM(s) Oral two times a day  methylPREDNISolone sodium succinate Injectable 40 milliGRAM(s) IV Push every 8 hours  senna 2 Tablet(s) Oral at bedtime  vancomycin  IVPB 1250 milliGRAM(s) IV Intermittent every 12 hours    Review of Systems:  Constitutional:  Negative for weight change, fever, malaise  HEENT:  Negative for sinus pain, sore throat, dysphagia, vision changes  Cardiovascular:  Negative for chest pain, palpitations, dizziness  Respiratory:  Negative for cough, wheezing; positive for dyspnea, hemoptysis  Gastrointestinal:  Negative for nausea, vomiting, diarrhea, melena  Musculoskeletal:  Negative for pain, swelling, stiffness   Neuro:  Negative for weakness, numbness, headache  Psych:  Negative for anxiety, depression  Endocrine:  Negative for polyuria, polydipsia, temperature Intolerance    All other systems negative unless otherwise stated    ICU Vital Signs Last 24 Hrs  T(C): 36.9 (29 Jun 2019 16:28), Max: 36.9 (29 Jun 2019 16:28)  T(F): 98.5 (29 Jun 2019 16:28), Max: 98.5 (29 Jun 2019 16:28)  HR: 105 (29 Jun 2019 18:40) (105 - 110)  BP: 118/89 (29 Jun 2019 18:40) (118/89 - 128/76)  BP(mean): 94 (29 Jun 2019 18:40) (94 - 94)  ABP: --  ABP(mean): --  RR: 19 (29 Jun 2019 18:40) (18 - 19)  SpO2: 94% (29 Jun 2019 18:40) (92% - 100%)    Daily Height in cm: 180.34 (29 Jun 2019 11:44)      Physical Exam:  Gen: Alert, no apparent distress  CV: Regular rate and rhythm no murmurs, rubs or gallops  Pulm: Clear to auscultation bilaterally, no rales, rhonchi or wheezes  GI: Abd is soft, non-tender and non-distended with +BS  Ext: No clubbing, cyanosis' 1+ bilateral lower extremity pitting edema; L ankle is larger than R ankle  Neuro: A+Ox3, follows commands and moves all extremities    Labs:                       10.5   13.22 )-----------( 197      ( 29 Jun 2019 16:40 )             37.3       06-29    142  |  91<L>  |  27<H>  ----------------------------<  130<H>  4.4   |  43<H>  |  0.89    Ca    10.6<H>      29 Jun 2019 16:40    Assessment/Plan: 48 year old man with sarcoidosis, pulmonary fibrosis, aspergilloma s/p DAVID lobectomy secondary to hemoptysis, respiratory failure s/p tracheotomy now decannulated was transferred from [facility] for bleeding at [facility] stoma.    Neuro:   Pain control with PCA / Tylenol IV                                           Cardiovascular:    Stable hemodynamics  Not on any pressors  Continue hemodynamic monitoring    Respiratory:  Patient is comfortable on nasal cannula  No bleeding from trach site since cauterization - will monitor  Continue Solumedrol for ? sarcoidosis or other inflammation as source for bleeding  Continue nebulizers  F/u chest CT    GI:  Tolerating soft diet  Continue bowel regimen  Continue Zofran for nausea - PRN	          Renal:  Creatinine was elevated but downtrending, now normal  Monitor I/Os and electrolytes    Hematologic / Oncology:  No signs of active bleeding, H/H stable (and increased from prior)  HSQ for DVT ppl    Infectious disease:  ID following  Will continue broad spectrum Vancomycin and Cefepime for pneumonia until 6/30  Colonized with candida auris - on contact precautions, infection control aware    Endocrine:  Continue Accuchecks with coverage    All clinical, lab, hemodynamic and radiographic data were reviewed and the plan was discussed with CTICU team.     Tio Roth MD GINNAOSCAR  MRN-[id number]    Patient is a 48y old  Male who presents with a chief complaint of Bleeding from tracheal stoma (29 Jun 2019 14:15)    HPI:  48 year old man with sarcoidosis, pulmonary fibrosis, aspergilloma s/p DAVID lobectomy secondary to hemoptysis, respiratory failure s/p tracheotomy now decannulated was transferred from [facility] for bleeding at trach stoma. Per history the patient had trach inserted as a part of a complicated post op course following lobectomy and was decannulated approximately 2 years ago. The patient was admitted to OSH with concerns for sepsis and was started on Vanco/Cefepime. He remained hemodynamically stable throughout stay with exception of some tachycardia. He was seen by general surgery and the tracheal stoma was cauterized using silver nitrate. The patient was transferred to [facility] for surgical evaluation of bleeding. He also had a swab positive for candida auris prior to transfer (site unknown). He complains of dyspnea and hoarseness currently.     PAST MEDICAL & SURGICAL HISTORY:  History of pneumothorax: History of 3 prior pneumonthoracies.  COPD (chronic obstructive pulmonary disease)  Asthma  Hypertension  Sarcoidosis  History of thoracotomy: 9/24/17 Left thoracotomy, Left upper lobectomy   9/25/17 Reop left thoracotomy, evacuation of left hemothorax    FAMILY HISTORY:  Family history of pancreatic cancer  Family history of essential hypertension    Social History:  Denies smoking, illicit drug use or frequent alcohol consumption    Allergies  No Known Allergies    MEDICATIONS  (STANDING):  ALBUTerol/ipratropium for Nebulization. 3 milliLiter(s) Nebulizer every 6 hours  cefepime   IVPB 2000 milliGRAM(s) IV Intermittent every 8 hours  chlorhexidine 4% Liquid 1 Application(s) Topical <User Schedule>  docusate sodium 100 milliGRAM(s) Oral two times a day  methylPREDNISolone sodium succinate Injectable 40 milliGRAM(s) IV Push every 8 hours  senna 2 Tablet(s) Oral at bedtime  vancomycin  IVPB 1250 milliGRAM(s) IV Intermittent every 12 hours    Review of Systems:  Constitutional:  Negative for weight change, fever, malaise  HEENT:  Negative for sinus pain, sore throat, dysphagia, vision changes  Cardiovascular:  Negative for chest pain, palpitations, dizziness  Respiratory:  Negative for cough, wheezing; positive for dyspnea, hemoptysis  Gastrointestinal:  Negative for nausea, vomiting, diarrhea, melena  Musculoskeletal:  Negative for pain, swelling, stiffness   Neuro:  Negative for weakness, numbness, headache  Psych:  Negative for anxiety, depression  Endocrine:  Negative for polyuria, polydipsia, temperature Intolerance    All other systems negative unless otherwise stated    ICU Vital Signs Last 24 Hrs  T(C): 36.9 (29 Jun 2019 16:28), Max: 36.9 (29 Jun 2019 16:28)  T(F): 98.5 (29 Jun 2019 16:28), Max: 98.5 (29 Jun 2019 16:28)  HR: 105 (29 Jun 2019 18:40) (105 - 110)  BP: 118/89 (29 Jun 2019 18:40) (118/89 - 128/76)  BP(mean): 94 (29 Jun 2019 18:40) (94 - 94)  ABP: --  ABP(mean): --  RR: 19 (29 Jun 2019 18:40) (18 - 19)  SpO2: 94% (29 Jun 2019 18:40) (92% - 100%)    Daily Height in cm: 180.34 (29 Jun 2019 11:44)      Physical Exam:  Gen: Alert, no apparent distress  CV: Regular rate and rhythm no murmurs, rubs or gallops  Pulm: Clear to auscultation bilaterally, no rales, rhonchi or wheezes  GI: Abd is soft, non-tender and non-distended with +BS  Ext: No clubbing, cyanosis' 1+ bilateral lower extremity pitting edema; L ankle is larger than R ankle  Neuro: A+Ox3, follows commands and moves all extremities    Labs:                       10.5   13.22 )-----------( 197      ( 29 Jun 2019 16:40 )             37.3       06-29    142  |  91<L>  |  27<H>  ----------------------------<  130<H>  4.4   |  43<H>  |  0.89    Ca    10.6<H>      29 Jun 2019 16:40    Assessment/Plan: 48 year old man with sarcoidosis, pulmonary fibrosis, aspergilloma s/p DAVID lobectomy secondary to hemoptysis, respiratory failure s/p tracheotomy now decannulated was transferred from [facility] for bleeding at [facility] stoma.    Neuro:   Pain control with PCA / Tylenol IV                                           Cardiovascular:    Stable hemodynamics  Not on any pressors  Continue hemodynamic monitoring    Respiratory:  Patient is comfortable on nasal cannula  No bleeding from trach site since cauterization - will monitor  Continue Solumedrol for ? sarcoidosis or other inflammation as source for bleeding  Continue nebulizers  F/u chest CT    GI:  Tolerating soft diet  Continue bowel regimen  Continue Zofran for nausea - PRN	          Renal:  Creatinine was elevated but downtrending, now normal  Monitor I/Os and electrolytes    Hematologic / Oncology:  No signs of active bleeding, H/H stable (and increased from prior)  SCDs for DVT ppl    Infectious disease:  ID following  Will continue broad spectrum Vancomycin and Cefepime for pneumonia until 6/30  Colonized with candida auris - on contact precautions, infection control aware    Endocrine:  Continue Accuchecks with coverage    All clinical, lab, hemodynamic and radiographic data were reviewed and the plan was discussed with CTICU team.     Tio Roth MD

## 2019-06-29 NOTE — CONSULT NOTE ADULT - ATTENDING COMMENTS
48 year old with history of sarcoidosis presents with reecent episode of hemoptysis from stoma    Tx for pneumonia at outside hospital;    Colinzed with candida auris    1) Pneumonia:   tx with vanco/ cefepime at OSH    Obtain cultures and imaging from outside hospital    2) C auris colonization    3) Leukocytosis  Multifactorial  Continue to monitor    Contact precautions 48 year old with history of sarcoidosis presents with reecent episode of hemoptysis from stoma    Tx for pneumonia at outside hospital;    Colinzed with candida auris    1) Pneumonia:   tx with vanco/ cefepime at OSH    Can stop vanco/ ceffepime on 6/30    Obtain cultures and imaging from outside hospital    Check sputum culture- routine, fungal, AFB (doubt Tb- no need for isolation)       2) C auris colonization    Contact isolation    3) Leukocytosis  Multifactorial  Continue to monitor    Contact precautions

## 2019-06-29 NOTE — CONSULT NOTE ADULT - SUBJECTIVE AND OBJECTIVE BOX
Patient is a 48y old  Male who presents with a chief complaint of Bleeding from tracheal stoma (2019 12:19)      HPI:  48 year old man with sarcoidosis, pulmonary fibrosis, hx/o aspergilloma s/p DAVID lobectomy secondary to hemoptysis transferred from [facility] for bleeding at trach stoma. Pt had trach inserted as a part of a complicated post op course following Lobectomy. Pt admitted to OSH with concerns for sepsis, was started on Vanco/Cefepime. Pt was remained hemodynimcally stable throughout stay with exception of some tachycardia. Pt was seen by general surgery and the tracheal stoma was cauterized using silver nitrate. Pt was transferred to [facility] for surgical evaluation of bleeding.  Of note- when patient was received at [facility], EMS stated pt was on isolation for a positive Candida auris test. Called [facility] and spoke to the nurse previosuly taking care of patient- patient did indeed have a positive Candida auris swab from ether the groin, axilla or king.  Attempted to reach attending regarding more details, awaiting callback. (2019 12:19)    Above reviewed. Patient had presented to [facility] for bright red blood from the tracheostomy. Denied SOB, cough , fever, chest pain.   Also at [facility], axillary swab done - positive for candida auris skin colonization and patient is on contact precautions,   hemoptysis from tracheostomy site has decreased now.     prior hospital charts reviewed [ x ]  primary team notes reviewed [ x ]  other consultant notes reviewed [x  ]    PAST MEDICAL & SURGICAL HISTORY:  History of pneumothorax: History of 3 prior pneumonthoracies.  COPD (chronic obstructive pulmonary disease)  Asthma  Hypertension  Sarcoidosis  History of thoracotomy: 17 Left thoracotomy, Left upper lobectomy   17 Reop left thoracotomy, evacuation of left hemothorax    Allergies  No Known Allergies    ANTIMICROBIALS (past 90 days)  MEDICATIONS  (STANDING):    ANTIMICROBIALS:    cefepime   IVPB 2000 every 8 hours  vancomycin  IVPB 1250 every 12 hours    OTHER MEDS: MEDICATIONS  (STANDING):  ALBUTerol/ipratropium for Nebulization. 3 every 6 hours  docusate sodium 100 two times a day  methylPREDNISolone sodium succinate Injectable 40 every 8 hours  senna 2 at bedtime    SOCIAL HISTORY:   non-smoker, no alcohol use,    FAMILY HISTORY:  Family history of pancreatic cancer, mother, , unknown age  Family history of essential hypertension      REVIEW OF SYSTEMS  [  ] ROS unobtainable because:    [x  ] All other systems negative except as noted below:	    Constitutional:  [ ] fever [ ] chills  [ ] weight loss  [ ] weakness  Skin:  [ ] rash [ ] phlebitis	  Eyes: [ ] icterus [ ] pain  [ ] discharge	  ENMT: [ ] sore throat  [ ] thrush [ ] ulcers [ ] exudates  Respiratory: [ ] dyspnea [x ] hemoptysis [ ] cough [ ] sputum	  Cardiovascular:  [ ] chest pain [ ] palpitations [ ] edema	  Gastrointestinal:  [ ] nausea [ ] vomiting [ ] diarrhea [ ] constipation [ ] pain	  Genitourinary:  [ ] dysuria [ ] frequency [ ] hematuria [ ] discharge [ ] flank pain  [ ] incontinence  Musculoskeletal:  [ ] myalgias [ ] arthralgias [ ] arthritis  [ ] back pain  Neurological:  [ ] headache [ ] seizures  [ ] confusion/altered mental status  Psychiatric:  [ ] anxiety [ ] depression	  Hematology/Lymphatics:  [ ] lymphadenopathy  Endocrine:  [ ] adrenal [ ] thyroid  Allergic/Immunologic:	 [ ] transplant [ ] seasonal    Vital Signs Last 24 Hrs  T(F): --    Vital Signs Last 24 Hrs  HR: --  BP: --  RR: --  SpO2: --  Wt(kg): --    PHYSICAL EXAM:  General: non-toxic  HEAD/EYES: anicteric, PERRL  ENT:  supple, tracheostomy site patent,  Cardiovascular:   S1, S2  Respiratory:  b/l wheezing   GI:  soft, non-tender, normal bowel sounds  :  no CVA tenderness   Musculoskeletal:  no synovitis  Neurologic:  grossly non-focal  Skin:  no rash  Lymph: no lymphadenopathy  Psychiatric:  appropriate affect  Vascular:  no phlebitis      MICROBIOLOGY:  NA    RADIOLOGY:  Na Patient is a 48y old  Male who presents with a chief complaint of Bleeding from tracheal stoma (2019 12:19)      HPI:  48 year old man with sarcoidosis, pulmonary fibrosis, hx/o aspergilloma s/p DAVID lobectomy secondary to hemoptysis transferred from [facility] for bleeding at trach stoma. Pt had trach inserted as a part of a complicated post op course following Lobectomy. Pt admitted to OSH with concerns for sepsis, was started on Vanco/Cefepime. Pt was remained hemodynimcally stable throughout stay with exception of some tachycardia. Pt was seen by general surgery and the tracheal stoma was cauterized using silver nitrate. Pt was transferred to [facility] for surgical evaluation of bleeding.  Of note- when patient was received at [facility], EMS stated pt was on isolation for a positive Candida auris test. Called [facility] and spoke to the nurse previosuly taking care of patient- patient did indeed have a positive Candida auris swab from ether the groin, axilla or king.  Attempted to reach attending regarding more details, awaiting callback. (2019 12:19)    Above reviewed. Patient had presented to [facility] for bright red blood from the tracheostomy. Denied SOB, cough , fever, chest pain.   Also at [facility], axillary swab done - positive for candida auris skin colonization and patient is on contact precautions,   hemoptysis from tracheostomy site has decreased now.     prior hospital charts reviewed [ x ]  primary team notes reviewed [ x ]  other consultant notes reviewed [x  ]    PAST MEDICAL & SURGICAL HISTORY:  History of pneumothorax: History of 3 prior pneumonthoracies.  COPD (chronic obstructive pulmonary disease)  Asthma  Hypertension  Sarcoidosis  History of thoracotomy: 17 Left thoracotomy, Left upper lobectomy   17 Reop left thoracotomy, evacuation of left hemothorax    Allergies  No Known Allergies    ANTIMICROBIALS (past 90 days)  MEDICATIONS  (STANDING):    ANTIMICROBIALS:    cefepime   IVPB 2000 every 8 hours  vancomycin  IVPB 1250 every 12 hours    OTHER MEDS: MEDICATIONS  (STANDING):  ALBUTerol/ipratropium for Nebulization. 3 every 6 hours  docusate sodium 100 two times a day  methylPREDNISolone sodium succinate Injectable 40 every 8 hours  senna 2 at bedtime    SOCIAL HISTORY:   non-smoker, no alcohol use,    FAMILY HISTORY:  Family history of pancreatic cancer, mother, , unknown age  Family history of essential hypertension      REVIEW OF SYSTEMS  [  ] ROS unobtainable because:    [x  ] All other systems negative except as noted below:	    Constitutional:  [ ] fever [ ] chills  [ ] weight loss  [ ] weakness  Skin:  [ ] rash [ ] phlebitis	  Eyes: [ ] icterus [ ] pain  [ ] discharge	  ENMT: [ ] sore throat  [ ] thrush [ ] ulcers [ ] exudates  Respiratory: [ ] dyspnea [x ] hemoptysis [ ] cough [ ] sputum	  Cardiovascular:  [ ] chest pain [ ] palpitations [ ] edema	  Gastrointestinal:  [ ] nausea [ ] vomiting [ ] diarrhea [ ] constipation [ ] pain	  Genitourinary:  [ ] dysuria [ ] frequency [ ] hematuria [ ] discharge [ ] flank pain  [ ] incontinence  Musculoskeletal:  [ ] myalgias [ ] arthralgias [ ] arthritis  [ ] back pain  Neurological:  [ ] headache [ ] seizures  [ ] confusion/altered mental status  Psychiatric:  [ ] anxiety [ ] depression	  Hematology/Lymphatics:  [ ] lymphadenopathy  Endocrine:  [ ] adrenal [ ] thyroid  Allergic/Immunologic:	 [ ] transplant [ ] seasonal    Vitals reviewed. afebrile, tachycardic 110, normotensive     PHYSICAL EXAM:  General: non-toxic  HEAD/EYES: anicteric, PERRL  ENT:  supple, tracheostomy site patent,  Cardiovascular:   S1, S2  Respiratory:  b/l wheezing   GI:  soft, non-tender, normal bowel sounds  :  no CVA tenderness   Musculoskeletal:  no synovitis  Neurologic:  grossly non-focal  Skin:  no rash  Lymph: no lymphadenopathy  Psychiatric:  appropriate affect  Vascular:  no phlebitis      MICROBIOLOGY:  NA    RADIOLOGY:  CT scan from [facility] reviewed.

## 2019-06-29 NOTE — PROVIDER CONTACT NOTE (OTHER) - SITUATION
patient received from [facility], EMS stated patient was on contact precautions for Candida Auris at previous hospital.

## 2019-06-29 NOTE — H&P ADULT - ASSESSMENT
48 year old man with sarcoidosis, pulmonary fibrosis, hx/o aspergilloma s/p DAVID lobectomy secondary to hemoptysis transferred from [facility] for bleeding at trach stoma. Of note, patient had tested positive for Candida auris    - strict isolation precautions. Will call ID and Infection control for inputs  - Bleeding at stoma site likely pulmonary in origin. Will most likely need a bronch at some point  - Will cont antibiotics and steriods from OSH for now.    ines aquino pac [phone]

## 2019-06-29 NOTE — PROVIDER CONTACT NOTE (OTHER) - BACKGROUND
Patient admitted to previous hospital for shortness of breath and bleeding at trach stoma site, patient was transferred to [facility] for further management

## 2019-06-29 NOTE — CONSULT NOTE ADULT - SUBJECTIVE AND OBJECTIVE BOX
Chief Complaint:  Patient is a 48y old  Male who presents with a chief complaint of Bleeding from tracheal stoma 48 year old man with sarcoidosis, pulmonary fibrosis, hx/o aspergilloma s/p DAVID lobectomy secondary to hemoptysis transferred from [facility] for bleeding at trach stoma. Pt had trach inserted as a part of a complicated post op course following Lobectomy. Pt admitted to OSH with concerns for sepsis, was started on Vanco/Cefepime. Pt was remained hemodynimcally stable throughout stay with exception of some tachycardia. Pt was seen by general surgery and the tracheal stoma was cauterized using silver nitrate. Pt was transferred to [facility] for surgical evaluation of bleeding.  Of note- when patient was received at [facility] EMS stated pt was on isolation for a positive Candida auris test. Called [facility] and spoke to the nurse previosuly taking care of patient- patient did indeed have a positive Candida auris swab from ether the groin, axilla or king.  Attempted to reach attending regarding more details, awaiting callback.     Review of Systems:  Review of Systems: Constitutional: [ ] Fever, [ ] Chills, [ ] Fatigue, [ ]Weight change   HEENT: [ ]Blurred vision,  [ ]Headache, [ ] Vision Changes    Respiratory: [ ]Cough, [ ]Wheezing, [ ] Shortness of breath, [ ] Hemoptysis   Cardiovascular: [ ]Chest Pain, [ ]Palpitations, [ ]MEYER, [ ]PND, [ ] Orthopnea  Gastrointestinal: [ ]Abdominal Pain, [ ]Diarrhea, [ ]Constipation, [ ] Nausea, [ ] Vomiting  Extremities: [ ]Swelling, [ ] Joint Pain  Neurologic: [ ]Focal deficit, [ ]Paresthesias, [ ]Syncope Skin: [ ]Rash, [ ]Ecchymoses, [ ] Wounds, [ ]Lesions	      Allergies:  No Known Allergies      Medications:  ALBUTerol/ipratropium for Nebulization. 3 milliLiter(s) Nebulizer every 6 hours  cefepime   IVPB 2000 milliGRAM(s) IV Intermittent every 8 hours  docusate sodium 100 milliGRAM(s) Oral two times a day  methylPREDNISolone sodium succinate Injectable 40 milliGRAM(s) IV Push every 8 hours  senna 2 Tablet(s) Oral at bedtime  vancomycin  IVPB 1250 milliGRAM(s) IV Intermittent every 12 hours      PMHX/PSHX:  History of pneumothorax  COPD (chronic obstructive pulmonary disease)  Asthma  Hypertension  Sarcoidosis  History of thoracotomy  No significant past surgical history      Family history:  Family history of pancreatic cancer  Family history of essential hypertension      Social History:     ROS:     General:  No wt loss, fevers, chills, night sweats, fatigue,   Eyes:  Good vision, no reported pain  ENT:  No sore throat, pain, runny nose, dysphagia  CV:  No pain, palpitations, hypo/hypertension  Resp:  No dyspnea, cough, tachypnea, wheezing  GI:  No pain, No nausea, No vomiting, No diarrhea, No constipation, No weight loss, No fever, No pruritis, No rectal bleeding, No tarry stools, No dysphagia,  :  No pain, bleeding, incontinence, nocturia  Muscle:  No pain, weakness  Neuro:  No weakness, tingling, memory problems  Psych:  No fatigue, insomnia, mood problems, depression  Endocrine:  No polyuria, polydipsia, cold/heat intolerance  Heme:  No petechiae, ecchymosis, easy bruisability  Skin:  No rash, tattoos, scars, edema      PHYSICAL EXAM:   Vital Signs:  Vital Signs Last 24 Hrs  T(C): --  T(F): --  HR: --  BP: --  BP(mean): --  RR: --  SpO2: --  Daily     Daily     GENERAL:  Appears stated age, well-groomed, well-nourished, no distress  HEENT:  NC/AT,  conjunctivae clear and pink, no thyromegaly, nodules, adenopathy, no JVD, sclera -anicteric  CHEST:  Full & symmetric excursion, no increased effort, breath sounds clear  HEART:  Regular rhythm, S1, S2, no murmur/rub/S3/S4, no abdominal bruit, no edema  ABDOMEN:  Soft, non-tender, non-distended, normoactive bowel sounds,  no masses ,no hepato-splenomegaly, no signs of chronic liver disease  EXTEREMITIES:  no cyanosis,clubbing or edema  SKIN:  No rash/erythema/ecchymoses/petechiae/wounds/abscess/warm/dry  NEURO:  Alert, oriented, no asterixis, no tremor, no encephalopathy    LABS:                    Imaging:

## 2019-06-29 NOTE — CONSULT NOTE ADULT - ASSESSMENT
7M with h/o HTN, PFO, sarcoidosis c/b aspergillosis (s/p Voriconazole course in 11/2017), asthma/COPD, s/p CVA w/ L hemiplegia ((9/2017), pAfib (not on A/C 2/2 recurrent hemoptysis), s/p emergency L upper lobectomy for hemoptysis requiring IR embolization of L bronchial artery (11/2017) complicated by pneumothorax requiring chest tube, s/p tracheostomy (11/24/17) for acute hypoxic respiratory failure, broncho-pulmonary fistula, thyroid arterial pseudoaneurysm and h/o C.diff colitis (1/2018) was transferred to [facility] from [facility] for hemoptysis.   Patient had presented to [facility] for bright red blood from the tracheostomy. Denied SOB, cough , fever, chest pain.   Also at [facility], axillary swab done - positive for candida auris skin colonization and patient is on contact precautions,   Currently Cefepime 2q8 and vancomycin 1250 q12 started in [facility] for pneumonia. 7M with h/o HTN, PFO, sarcoidosis c/b aspergillosis (s/p Voriconazole course in 11/2017), asthma/COPD, s/p CVA w/ L hemiplegia ((9/2017), pAfib (not on A/C 2/2 recurrent hemoptysis), s/p emergency L upper lobectomy for hemoptysis requiring IR embolization of L bronchial artery (11/2017) complicated by pneumothorax requiring chest tube, s/p tracheostomy (11/24/17) for acute hypoxic respiratory failure, broncho-pulmonary fistula, thyroid arterial pseudoaneurysm and h/o C.diff colitis (1/2018) was transferred to [facility] from [facility] for hemoptysis.   Patient had presented to [facility] for bright red blood from the tracheostomy. Denied SOB, cough , fever, chest pain.   Also at [facility], axillary swab done - positive for candida auris skin colonization and patient is on contact precautions,   Currently Cefepime 2q8 and vancomycin 1250 q12 started in [facility] for pneumonia.       Hemoptysis  Pneumonia   Candida auris colonisation       *Pneumonia   c/w Vancomycin and cefepime for one more day. Can d/c on 6/30   check sputum cx routine, fungal and TB (low suspicion, no need for isolation)    *Candida auris colonisation    spoke with infection prevention     *hemoptysis    plan per CT surgery/MICU team- ? bronchoscopy to localize site of bleed   monitor CBC     d/w team

## 2019-06-30 LAB
ANION GAP SERPL CALC-SCNC: 8 MMO/L — SIGNIFICANT CHANGE UP (ref 7–14)
APTT BLD: 26.3 SEC — LOW (ref 27.5–36.3)
BLD GP AB SCN SERPL QL: NEGATIVE — SIGNIFICANT CHANGE UP
BUN SERPL-MCNC: 26 MG/DL — HIGH (ref 7–23)
CALCIUM SERPL-MCNC: 10.2 MG/DL — SIGNIFICANT CHANGE UP (ref 8.4–10.5)
CHLORIDE SERPL-SCNC: 92 MMOL/L — LOW (ref 98–107)
CO2 SERPL-SCNC: 41 MMOL/L — HIGH (ref 22–31)
CREAT SERPL-MCNC: 0.78 MG/DL — SIGNIFICANT CHANGE UP (ref 0.5–1.3)
GLUCOSE SERPL-MCNC: 150 MG/DL — HIGH (ref 70–99)
HCT VFR BLD CALC: 35.3 % — LOW (ref 39–50)
HGB BLD-MCNC: 10 G/DL — LOW (ref 13–17)
INR BLD: 1.07 — SIGNIFICANT CHANGE UP (ref 0.88–1.17)
MAGNESIUM SERPL-MCNC: 2.1 MG/DL — SIGNIFICANT CHANGE UP (ref 1.6–2.6)
MCHC RBC-ENTMCNC: 26.5 PG — LOW (ref 27–34)
MCHC RBC-ENTMCNC: 28.3 % — LOW (ref 32–36)
MCV RBC AUTO: 93.4 FL — SIGNIFICANT CHANGE UP (ref 80–100)
NRBC # FLD: 0 K/UL — SIGNIFICANT CHANGE UP (ref 0–0)
PHOSPHATE SERPL-MCNC: 2.9 MG/DL — SIGNIFICANT CHANGE UP (ref 2.5–4.5)
PLATELET # BLD AUTO: 180 K/UL — SIGNIFICANT CHANGE UP (ref 150–400)
PMV BLD: 12.6 FL — SIGNIFICANT CHANGE UP (ref 7–13)
POTASSIUM SERPL-MCNC: 4.8 MMOL/L — SIGNIFICANT CHANGE UP (ref 3.5–5.3)
POTASSIUM SERPL-SCNC: 4.8 MMOL/L — SIGNIFICANT CHANGE UP (ref 3.5–5.3)
PROTHROM AB SERPL-ACNC: 12.2 SEC — SIGNIFICANT CHANGE UP (ref 9.8–13.1)
RBC # BLD: 3.78 M/UL — LOW (ref 4.2–5.8)
RBC # FLD: 13.5 % — SIGNIFICANT CHANGE UP (ref 10.3–14.5)
RH IG SCN BLD-IMP: POSITIVE — SIGNIFICANT CHANGE UP
SODIUM SERPL-SCNC: 141 MMOL/L — SIGNIFICANT CHANGE UP (ref 135–145)
VANCOMYCIN FLD-MCNC: 13.5 UG/ML — SIGNIFICANT CHANGE UP
WBC # BLD: 11.02 K/UL — HIGH (ref 3.8–10.5)
WBC # FLD AUTO: 11.02 K/UL — HIGH (ref 3.8–10.5)

## 2019-06-30 PROCEDURE — 71045 X-RAY EXAM CHEST 1 VIEW: CPT | Mod: 26

## 2019-06-30 PROCEDURE — 99232 SBSQ HOSP IP/OBS MODERATE 35: CPT

## 2019-06-30 PROCEDURE — 99233 SBSQ HOSP IP/OBS HIGH 50: CPT

## 2019-06-30 RX ORDER — OXYCODONE HYDROCHLORIDE 5 MG/1
5 TABLET ORAL EVERY 6 HOURS
Refills: 0 | Status: DISCONTINUED | OUTPATIENT
Start: 2019-06-30 | End: 2019-07-02

## 2019-06-30 RX ORDER — PANTOPRAZOLE SODIUM 20 MG/1
40 TABLET, DELAYED RELEASE ORAL
Refills: 0 | Status: DISCONTINUED | OUTPATIENT
Start: 2019-07-01 | End: 2019-07-02

## 2019-06-30 RX ORDER — POLYETHYLENE GLYCOL 3350 17 G/17G
17 POWDER, FOR SOLUTION ORAL DAILY
Refills: 0 | Status: DISCONTINUED | OUTPATIENT
Start: 2019-06-30 | End: 2019-07-02

## 2019-06-30 RX ORDER — METOPROLOL TARTRATE 50 MG
12.5 TABLET ORAL ONCE
Refills: 0 | Status: COMPLETED | OUTPATIENT
Start: 2019-06-30 | End: 2019-06-30

## 2019-06-30 RX ORDER — IPRATROPIUM BROMIDE 0.2 MG/ML
500 SOLUTION, NON-ORAL INHALATION EVERY 6 HOURS
Refills: 0 | Status: DISCONTINUED | OUTPATIENT
Start: 2019-06-30 | End: 2019-07-02

## 2019-06-30 RX ORDER — METOPROLOL TARTRATE 50 MG
12.5 TABLET ORAL EVERY 12 HOURS
Refills: 0 | Status: DISCONTINUED | OUTPATIENT
Start: 2019-06-30 | End: 2019-07-02

## 2019-06-30 RX ORDER — OXYCODONE HYDROCHLORIDE 5 MG/1
10 TABLET ORAL EVERY 6 HOURS
Refills: 0 | Status: DISCONTINUED | OUTPATIENT
Start: 2019-06-30 | End: 2019-07-02

## 2019-06-30 RX ORDER — ACETAMINOPHEN 500 MG
650 TABLET ORAL EVERY 6 HOURS
Refills: 0 | Status: DISCONTINUED | OUTPATIENT
Start: 2019-06-30 | End: 2019-07-02

## 2019-06-30 RX ORDER — PANTOPRAZOLE SODIUM 20 MG/1
40 TABLET, DELAYED RELEASE ORAL ONCE
Refills: 0 | Status: COMPLETED | OUTPATIENT
Start: 2019-06-30 | End: 2019-06-30

## 2019-06-30 RX ADMIN — Medication 500 MICROGRAM(S): at 21:47

## 2019-06-30 RX ADMIN — Medication 12.5 MILLIGRAM(S): at 13:37

## 2019-06-30 RX ADMIN — Medication 166.67 MILLIGRAM(S): at 08:30

## 2019-06-30 RX ADMIN — Medication 100 MILLIGRAM(S): at 17:44

## 2019-06-30 RX ADMIN — PANTOPRAZOLE SODIUM 40 MILLIGRAM(S): 20 TABLET, DELAYED RELEASE ORAL at 13:37

## 2019-06-30 RX ADMIN — CEFEPIME 100 MILLIGRAM(S): 1 INJECTION, POWDER, FOR SOLUTION INTRAMUSCULAR; INTRAVENOUS at 14:23

## 2019-06-30 RX ADMIN — Medication 3 MILLILITER(S): at 04:02

## 2019-06-30 RX ADMIN — Medication 40 MILLIGRAM(S): at 06:14

## 2019-06-30 RX ADMIN — Medication 100 MILLIGRAM(S): at 06:15

## 2019-06-30 RX ADMIN — Medication 500 MICROGRAM(S): at 16:46

## 2019-06-30 RX ADMIN — CEFEPIME 100 MILLIGRAM(S): 1 INJECTION, POWDER, FOR SOLUTION INTRAMUSCULAR; INTRAVENOUS at 06:14

## 2019-06-30 RX ADMIN — Medication 3 MILLILITER(S): at 09:46

## 2019-06-30 RX ADMIN — CHLORHEXIDINE GLUCONATE 1 APPLICATION(S): 213 SOLUTION TOPICAL at 06:15

## 2019-06-30 RX ADMIN — Medication 40 MILLIGRAM(S): at 14:22

## 2019-06-30 RX ADMIN — Medication 12.5 MILLIGRAM(S): at 23:38

## 2019-06-30 RX ADMIN — Medication 40 MILLIGRAM(S): at 22:32

## 2019-06-30 RX ADMIN — SENNA PLUS 2 TABLET(S): 8.6 TABLET ORAL at 22:32

## 2019-06-30 NOTE — PROGRESS NOTE ADULT - ASSESSMENT
48 year old with history of sarcoidosis presents with reecent episode of hemoptysis from stoma    Tx for pneumonia at outside hospital;    Colinzed with candida auris    1) Pneumonia:   tx with vanco/ cefepime at OSH    Can stop vanco/ ceffepime today (6/30)    Obtain cultures and imaging from outside hospital    Check sputum culture- routine, fungal, AFB (doubt Tb- no need for isolation)       2) C auris colonization    Contact isolation    3) Leukocytosis  Multifactorial  Continue to monitor    Contact precautions

## 2019-06-30 NOTE — PROGRESS NOTE ADULT - SUBJECTIVE AND OBJECTIVE BOX
Follow Up:      Inverval History/ROS:Patient is a 48y old  Male who presents with a chief complaint of Bleeding from tracheal stoma (30 Jun 2019 07:17)    Feels better  No fever      Allergies    No Known Allergies    Intolerances        ANTIMICROBIALS:  cefepime   IVPB 2000 every 8 hours  vancomycin  IVPB 1250 every 12 hours      OTHER MEDS:  bisacodyl Suppository 10 milliGRAM(s) Rectal daily PRN  chlorhexidine 4% Liquid 1 Application(s) Topical <User Schedule>  docusate sodium 100 milliGRAM(s) Oral two times a day  ipratropium    for Nebulization 500 MICROGram(s) Nebulizer every 6 hours  methylPREDNISolone sodium succinate Injectable 40 milliGRAM(s) IV Push every 8 hours  metoprolol tartrate 12.5 milliGRAM(s) Oral once  metoprolol tartrate 12.5 milliGRAM(s) Oral every 12 hours  pantoprazole  Injectable 40 milliGRAM(s) IV Push once  polyethylene glycol 3350 17 Gram(s) Oral daily PRN  senna 2 Tablet(s) Oral at bedtime      Vital Signs Last 24 Hrs  T(C): 36.4 (30 Jun 2019 12:00), Max: 36.9 (29 Jun 2019 16:28)  T(F): 97.6 (30 Jun 2019 12:00), Max: 98.5 (29 Jun 2019 16:28)  HR: 110 (30 Jun 2019 13:00) (86 - 117)  BP: 112/80 (30 Jun 2019 13:00) (105/82 - 141/98)  BP(mean): 88 (30 Jun 2019 13:00) (85 - 103)  RR: 31 (30 Jun 2019 13:00) (18 - 35)  SpO2: 95% (30 Jun 2019 13:00) (89% - 100%)    PHYSICAL EXAM:  General: [x ] non-toxic  HEAD/EYES: [ ] PERRL [x ] white sclera [ ] icterus  ENT:  [ ] normal [ ] supple [ ] thrush [x ] stoma cdi  Cardiovascular:   [ ] murmur [x ] normal [ ] PPM/AICD  Respiratory:  [x ] clear to ausculation bilaterally  GI:  x[ ] soft, non-tender, normal bowel sounds  :  [ ] mock [ ] no CVA tenderness   Musculoskeletal:  [ ] no synovitis  Neurologic:  [ ] non-focal exam   Skin:  x[ ] no rash  Lymph: [ ] no lymphadenopathy  Psychiatric:  [ ] appropriate affect [x ] alert & oriented  Lines:  [x ] no phlebitis [ ] central line                                10.0   11.02 )-----------( 180      ( 30 Jun 2019 03:10 )             35.3       06-30    141  |  92<L>  |  26<H>  ----------------------------<  150<H>  4.8   |  41<H>  |  0.78    Ca    10.2      30 Jun 2019 03:10  Phos  2.9     06-30  Mg     2.1     06-30            MICROBIOLOGY:    RADIOLOGY:

## 2019-06-30 NOTE — PROGRESS NOTE ADULT - SUBJECTIVE AND OBJECTIVE BOX
GINNAOSCAR  MRN-[id number]    Patient is a 48y old  Male who presents with a chief complaint of Bleeding from tracheal stoma (2019 18:51)    HPI:  48 year old man with sarcoidosis, pulmonary fibrosis, aspergilloma s/p DAVID lobectomy secondary to hemoptysis, respiratory failure s/p tracheotomy now decannulated was transferred from [facility] for bleeding at trach stoma. Per history the patient had trach inserted as a part of a complicated post op course following lobectomy and was decannulated approximately 2 years ago. The patient was admitted to OSH with concerns for sepsis and was started on Vanco/Cefepime. He remained hemodynamically stable throughout stay with exception of some tachycardia. He was seen by general surgery and the tracheal stoma was cauterized using silver nitrate. The patient was transferred to [facility] for surgical evaluation of bleeding. He also had a swab positive for candida auris prior to transfer (site unknown). He complains of dyspnea and hoarseness currently.     PAST MEDICAL & SURGICAL HISTORY:  History of pneumothorax: History of 3 prior pneumonthoracies.  COPD (chronic obstructive pulmonary disease)  Asthma  Hypertension  Sarcoidosis  History of thoracotomy: 17 Left thoracotomy, Left upper lobectomy   17 Reop left thoracotomy, evacuation of left hemothorax    FAMILY HISTORY:  Family history of pancreatic cancer  Family history of essential hypertension    Social History:  Denies smoking, illicit drug use or frequent alcohol consumption    Allergies  No Known Allergies    MEDICATIONS  (STANDING):  ALBUTerol/ipratropium for Nebulization. 3 milliLiter(s) Nebulizer every 6 hours  cefepime   IVPB 2000 milliGRAM(s) IV Intermittent every 8 hours  chlorhexidine 4% Liquid 1 Application(s) Topical <User Schedule>  docusate sodium 100 milliGRAM(s) Oral two times a day  methylPREDNISolone sodium succinate Injectable 40 milliGRAM(s) IV Push every 8 hours  senna 2 Tablet(s) Oral at bedtime  vancomycin  IVPB 1250 milliGRAM(s) IV Intermittent every 12 hours    Review of Systems:  Constitutional:  Negative for weight change, fever, malaise  HEENT:  Negative for sinus pain, hoarseness, sore throat, dysphagia, vision changes  Cardiovascular:  Negative for chest pain, palpitations, dizziness  Respiratory:  Negative for cough, wheezing, dyspnea  Gastrointestinal:  Negative for nausea, vomiting, diarrhea, melena  Musculoskeletal:  Negative for pain, swelling, stiffness   Neuro:  Negative for weakness, numbness, headache  Psych:  Negative for anxiety, depression  Endocrine:  Negative for polyuria, polydipsia, temperature Intolerance    All other systems negative unless otherwise stated    ICU Vital Signs Last 24 Hrs  T(C): 36.4 (2019 04:00), Max: 36.9 (2019 16:28)  T(F): 97.5 (2019 04:00), Max: 98.5 (2019 16:28)  HR: 86 (2019 07:00) (86 - 117)  BP: 105/82 (2019 07:00) (105/82 - 141/98)  BP(mean): 88 (2019 07:00) (85 - 103)  ABP: --  ABP(mean): --  RR: 28 (2019 07:00) (18 - 35)  SpO2: 92% (2019 07:00) (89% - 100%)    Daily Height in cm: 180.34 (2019 11:44)    Daily Weight in k.4 (2019 05:00)  I&O's Summary    2019 07:01  -  2019 07:00  --------------------------------------------------------  IN: 100 mL / OUT: 375 mL / NET: -275 mL    Physical Exam:  Gen: Alert, no apparent distress  CV: Regular rate and rhythm no murmurs, rubs or gallops  Pulm: Clear to auscultation bilaterally, no rales, rhonchi or wheezes  GI: Abd is soft, non-tender and non-distended with +BS  Ext: No clubbing, cyanosis' 1+ bilateral lower extremity pitting edema; L ankle is larger than R ankle  Neuro: A+Ox3, follows commands and moves all extremities    Labs:                          10.0   11.02 )-----------( 180      ( 2019 03:10 )             35.3       06-30    141  |  92<L>  |  26<H>  ----------------------------<  150<H>  4.8   |  41<H>  |  0.78    Ca    10.2      2019 03:10  Phos  2.9     06-30  Mg     2.1     06-30    PT/INR - ( 2019 03:10 )   PT: 12.2 SEC;   INR: 1.07     PTT - ( 2019 03:10 )  PTT:26.3 SEC    Assessment/Plan: 48 year old man with sarcoidosis, pulmonary fibrosis, aspergilloma s/p DAVID lobectomy secondary to hemoptysis, respiratory failure s/p tracheotomy now decannulated was transferred from [facility] for bleeding at [facility] stoma.    Neuro:   Pain control with Tylenol IV                                           Cardiovascular:    Stable hemodynamics  Not on any pressors  Continue hemodynamic monitoring    Respiratory:  Patient is comfortable on nasal cannula  Minimal bleeding from trach site since cauterization - will monitor  Continue Solumedrol for ? sarcoidosis or other inflammation as source for bleeding  Continue nebulizers  F/u chest CT    GI:  Tolerating soft diet  Continue bowel regimen  Continue Zofran for nausea - PRN	          Renal:  Creatinine was elevated but downtrending, now normal  Monitor I/Os and electrolytes    Hematologic / Oncology:  No signs of active bleeding, H/H stable   SCDs for DVT ppl    Infectious disease:  ID following  Will continue broad spectrum Vancomycin and Cefepime for pneumonia until   Colonized with candida auris - on contact precautions, infection control aware  Re-cultured per ID    Endocrine:  Continue Accuchecks with coverage    All clinical, lab, hemodynamic and radiographic data were reviewed and the plan was discussed with CTICU team.     Tio Roth MD

## 2019-06-30 NOTE — PROGRESS NOTE ADULT - SUBJECTIVE AND OBJECTIVE BOX
INTERVAL HPI/OVERNIGHT EVENTS:  No new overnight event.  No N/V/D.  Tolerating diet.  tolerating po    Allergies    No Known Allergies    Intolerances    General:  No wt loss, fevers, chills, night sweats, fatigue,   Eyes:  Good vision, no reported pain  ENT:  No sore throat, pain, runny nose, dysphagia  CV:  No pain, palpitations, hypo/hypertension  Resp:  No dyspnea, cough, tachypnea, wheezing  GI:  No pain, No nausea, No vomiting, No diarrhea, No constipation, No weight loss, No fever, No pruritis, No rectal bleeding, No tarry stools, No dysphagia,  :  No pain, bleeding, incontinence, nocturia  Muscle:  No pain, weakness  Neuro:  No weakness, tingling, memory problems  Psych:  No fatigue, insomnia, mood problems, depression  Endocrine:  No polyuria, polydipsia, cold/heat intolerance  Heme:  No petechiae, ecchymosis, easy bruisability  Skin:  No rash, tattoos, scars, edema      PHYSICAL EXAM:   Vital Signs:  Vital Signs Last 24 Hrs  T(C): 36.4 (2019 12:00), Max: 36.9 (2019 16:28)  T(F): 97.6 (2019 12:00), Max: 98.5 (2019 16:28)  HR: 92 (2019 16:00) (86 - 117)  BP: 120/79 (2019 16:00) (105/82 - 141/98)  BP(mean): 87 (2019 16:00) (85 - 103)  RR: 26 (2019 16:00) (18 - 35)  SpO2: 95% (2019 16:00) (89% - 100%)  Daily     Daily Weight in k.4 (2019 05:00)I&O's Summary    2019 07:  -  2019 07:00  --------------------------------------------------------  IN: 100 mL / OUT: 375 mL / NET: -275 mL    2019 07:01  -  2019 16:25  --------------------------------------------------------  IN: 300 mL / OUT: 200 mL / NET: 100 mL        GENERAL:  Appears stated age, well-groomed, well-nourished, no distress  HEENT:  NC/AT,  conjunctivae clear and pink, no thyromegaly, nodules, adenopathy, no JVD, sclera -anicteric  CHEST:  Full & symmetric excursion, no increased effort, breath sounds clear  HEART:  Regular rhythm, S1, S2, no murmur/rub/S3/S4, no abdominal bruit, no edema  ABDOMEN:  Soft, non-tender, non-distended, normoactive bowel sounds,  no masses ,no hepato-splenomegaly, no signs of chronic liver disease  EXTEREMITIES:  no cyanosis,clubbing or edema  SKIN:  No rash/erythema/ecchymoses/petechiae/wounds/abscess/warm/dry  NEURO:  Alert, oriented, no asterixis, no tremor, no encephalopathy      LABS:                        10.0   11.02 )-----------( 180      ( 2019 03:10 )             35.3     06-30    141  |  92<L>  |  26<H>  ----------------------------<  150<H>  4.8   |  41<H>  |  0.78    Ca    10.2      2019 03:10  Phos  2.9     06-30  Mg     2.1     06-30      PT/INR - ( 2019 03:10 )   PT: 12.2 SEC;   INR: 1.07          PTT - ( 2019 03:10 )  PTT:26.3 SEC    amylase   lipase  RADIOLOGY & ADDITIONAL TESTS:

## 2019-07-01 ENCOUNTER — TRANSCRIPTION ENCOUNTER (OUTPATIENT)
Age: 49
End: 2019-07-01

## 2019-07-01 LAB
ANION GAP SERPL CALC-SCNC: 8 MMO/L — SIGNIFICANT CHANGE UP (ref 7–14)
BUN SERPL-MCNC: 26 MG/DL — HIGH (ref 7–23)
CALCIUM SERPL-MCNC: 10.4 MG/DL — SIGNIFICANT CHANGE UP (ref 8.4–10.5)
CHLORIDE SERPL-SCNC: 90 MMOL/L — LOW (ref 98–107)
CO2 SERPL-SCNC: 42 MMOL/L — HIGH (ref 22–31)
CREAT SERPL-MCNC: 0.92 MG/DL — SIGNIFICANT CHANGE UP (ref 0.5–1.3)
CULTURE - ACID FAST SMEAR CONCENTRATED: SIGNIFICANT CHANGE UP
GLUCOSE SERPL-MCNC: 131 MG/DL — HIGH (ref 70–99)
HCT VFR BLD CALC: 34.6 % — LOW (ref 39–50)
HGB BLD-MCNC: 10 G/DL — LOW (ref 13–17)
MAGNESIUM SERPL-MCNC: 2.1 MG/DL — SIGNIFICANT CHANGE UP (ref 1.6–2.6)
MCHC RBC-ENTMCNC: 27.1 PG — SIGNIFICANT CHANGE UP (ref 27–34)
MCHC RBC-ENTMCNC: 28.9 % — LOW (ref 32–36)
MCV RBC AUTO: 93.8 FL — SIGNIFICANT CHANGE UP (ref 80–100)
NRBC # FLD: 0 K/UL — SIGNIFICANT CHANGE UP (ref 0–0)
PHOSPHATE SERPL-MCNC: 2.7 MG/DL — SIGNIFICANT CHANGE UP (ref 2.5–4.5)
PLATELET # BLD AUTO: 182 K/UL — SIGNIFICANT CHANGE UP (ref 150–400)
PMV BLD: 12.5 FL — SIGNIFICANT CHANGE UP (ref 7–13)
POTASSIUM SERPL-MCNC: 4.9 MMOL/L — SIGNIFICANT CHANGE UP (ref 3.5–5.3)
POTASSIUM SERPL-SCNC: 4.9 MMOL/L — SIGNIFICANT CHANGE UP (ref 3.5–5.3)
RBC # BLD: 3.69 M/UL — LOW (ref 4.2–5.8)
RBC # FLD: 13.5 % — SIGNIFICANT CHANGE UP (ref 10.3–14.5)
SODIUM SERPL-SCNC: 140 MMOL/L — SIGNIFICANT CHANGE UP (ref 135–145)
SPECIMEN SOURCE: SIGNIFICANT CHANGE UP
WBC # BLD: 14.69 K/UL — HIGH (ref 3.8–10.5)
WBC # FLD AUTO: 14.69 K/UL — HIGH (ref 3.8–10.5)

## 2019-07-01 PROCEDURE — 99233 SBSQ HOSP IP/OBS HIGH 50: CPT

## 2019-07-01 PROCEDURE — 99232 SBSQ HOSP IP/OBS MODERATE 35: CPT

## 2019-07-01 PROCEDURE — 71045 X-RAY EXAM CHEST 1 VIEW: CPT | Mod: 26

## 2019-07-01 PROCEDURE — 99223 1ST HOSP IP/OBS HIGH 75: CPT | Mod: GC

## 2019-07-01 RX ORDER — ALPRAZOLAM 0.25 MG
0.25 TABLET ORAL ONCE
Refills: 0 | Status: DISCONTINUED | OUTPATIENT
Start: 2019-07-01 | End: 2019-07-01

## 2019-07-01 RX ORDER — ALPRAZOLAM 0.25 MG
0.25 TABLET ORAL ONCE
Refills: 0 | Status: DISCONTINUED | OUTPATIENT
Start: 2019-07-01 | End: 2019-07-02

## 2019-07-01 RX ADMIN — Medication 12.5 MILLIGRAM(S): at 12:14

## 2019-07-01 RX ADMIN — Medication 500 MICROGRAM(S): at 03:49

## 2019-07-01 RX ADMIN — Medication 100 MILLIGRAM(S): at 21:32

## 2019-07-01 RX ADMIN — SENNA PLUS 2 TABLET(S): 8.6 TABLET ORAL at 21:32

## 2019-07-01 RX ADMIN — PANTOPRAZOLE SODIUM 40 MILLIGRAM(S): 20 TABLET, DELAYED RELEASE ORAL at 09:35

## 2019-07-01 RX ADMIN — Medication 500 MICROGRAM(S): at 09:45

## 2019-07-01 RX ADMIN — Medication 40 MILLIGRAM(S): at 06:24

## 2019-07-01 RX ADMIN — Medication 500 MICROGRAM(S): at 22:14

## 2019-07-01 RX ADMIN — Medication 0.25 MILLIGRAM(S): at 09:34

## 2019-07-01 RX ADMIN — CHLORHEXIDINE GLUCONATE 1 APPLICATION(S): 213 SOLUTION TOPICAL at 06:25

## 2019-07-01 RX ADMIN — Medication 5 MILLIGRAM(S): at 12:14

## 2019-07-01 RX ADMIN — Medication 500 MICROGRAM(S): at 16:00

## 2019-07-01 RX ADMIN — Medication 100 MILLIGRAM(S): at 09:34

## 2019-07-01 NOTE — PROGRESS NOTE ADULT - ASSESSMENT
48 year old with history of sarcoidosis presents with reecent episode of hemoptysis from stoma    Tx for pneumonia at outside hospital;    Colinzed with candida auris    1) Pneumonia:   S/P Tx  with vanco/ cefepime at OSH    Observe off aBX      Check sputum culture- routine, fungal, AFB (doubt Tb- no need for isolation)       2) C auris colonization    Contact isolation    3) Leukocytosis  Multifactorial  Continue to monitor    Contact precautions

## 2019-07-01 NOTE — CONSULT NOTE ADULT - ATTENDING COMMENTS
agree with above. pt with very complicated medical and surgical history. Was seen at [facility] lung transplant consultation, next appointmt next week.

## 2019-07-01 NOTE — PROGRESS NOTE ADULT - SUBJECTIVE AND OBJECTIVE BOX
INTERVAL HPI/OVERNIGHT EVENTS:    denies n/v/d/c, abdominal pain, melena or brbpr     MEDICATIONS  (STANDING):  chlorhexidine 4% Liquid 1 Application(s) Topical <User Schedule>  docusate sodium 100 milliGRAM(s) Oral two times a day  ipratropium    for Nebulization 500 MICROGram(s) Nebulizer every 6 hours  metoprolol tartrate 12.5 milliGRAM(s) Oral every 12 hours  pantoprazole    Tablet 40 milliGRAM(s) Oral before breakfast  predniSONE   Tablet 5 milliGRAM(s) Oral daily  senna 2 Tablet(s) Oral at bedtime    MEDICATIONS  (PRN):  acetaminophen   Tablet .. 650 milliGRAM(s) Oral every 6 hours PRN Temp greater or equal to 38C (100.4F), Mild Pain (1 - 3)  bisacodyl Suppository 10 milliGRAM(s) Rectal daily PRN Constipation  oxyCODONE    IR 5 milliGRAM(s) Oral every 6 hours PRN Moderate Pain (4 - 6)  oxyCODONE    IR 10 milliGRAM(s) Oral every 6 hours PRN Severe Pain (7 - 10)  polyethylene glycol 3350 17 Gram(s) Oral daily PRN Constipation      Allergies    No Known Allergies    Intolerances        Review of Systems:    General:  No wt loss, fevers, chills, night sweats, fatigue   Eyes:  Good vision, no reported pain  ENT:  No sore throat, pain, runny nose, dysphagia  CV:  No pain, palpitations, hypo/hypertension  Resp:  No dyspnea, cough, tachypnea, wheezing  GI:  No pain, No nausea, No vomiting, No diarrhea, No constipation, No weight loss, No fever, No pruritis, No rectal bleeding, No melena, No dysphagia  :  No pain, bleeding, incontinence, nocturia  Muscle:  No pain, weakness  Neuro:  No weakness, tingling, memory problems  Psych:  No fatigue, insomnia, mood problems, depression  Endocrine:  No polyuria, polydypsia, cold/heat intolerance  Heme:  No petechiae, ecchymosis, easy bruisability  Skin:  No rash, tattoos, scars, edema      Vital Signs Last 24 Hrs  T(C): 36.2 (01 Jul 2019 04:00), Max: 36.8 (30 Jun 2019 16:00)  T(F): 97.2 (01 Jul 2019 04:00), Max: 98.3 (30 Jun 2019 16:00)  HR: 122 (01 Jul 2019 12:00) (84 - 122)  BP: 134/9 (01 Jul 2019 12:00) (109/82 - 140/97)  BP(mean): 103 (01 Jul 2019 12:00) (85 - 109)  RR: 35 (01 Jul 2019 12:00) (19 - 35)  SpO2: 92% (01 Jul 2019 12:00) (91% - 99%)    PHYSICAL EXAM:    Constitutional: NAD  HEENT: EOMI, throat clear  Neck: No LAD, supple +trach  Respiratory: CTA and P  Cardiovascular: S1 and S2, RRR, no M  Gastrointestinal: BS+, soft, NT/ND, neg HSM,  Extremities: No peripheral edema, neg clubbing, cyanosis  Vascular: 2+ peripheral pulses  Neurological: A/O x 3, no focal deficits  Psychiatric: Normal mood, normal affect  Skin: No rashes      LABS:                        10.0   14.69 )-----------( 182      ( 01 Jul 2019 05:00 )             34.6     07-01    140  |  90<L>  |  26<H>  ----------------------------<  131<H>  4.9   |  42<H>  |  0.92    Ca    10.4      01 Jul 2019 05:00  Phos  2.7     07-01  Mg     2.1     07-01      PT/INR - ( 30 Jun 2019 03:10 )   PT: 12.2 SEC;   INR: 1.07          PTT - ( 30 Jun 2019 03:10 )  PTT:26.3 SEC      RADIOLOGY & ADDITIONAL TESTS:

## 2019-07-01 NOTE — CONSULT NOTE ADULT - ASSESSMENT
INCOMPLETE Pt is a 48M with PMHx sarcoidosis, aspergillosis, PFO s/p CVA with residual left hemiplegia, paroxysmal Afib (not on A/C), and hx DAVID bleeding with hemoptysis s/p emergent thoracotomy with DAVID lobectomy (9/24/17) s/p IR embolization of left bronchial artery (11/16/17) c/b PTX s/p chest tube placement and tracheostomy placement (11/17) for persistent hypoxemic respiratory failure further c/b broncho-pulmonary fistula/lung herniation, hx severe CDiff colitis (1/2018), and hx thyroid arterial pseudoaneurysm now presenting to [facility] from OSH for recurrent hemoptysis. Hemoptysis scant with mucous, unable to quantify yet. Pt in no respiratory distress and HD stable.  -Appreciate primary care by thoracic sx team   -Agree with diagnostic bronchoscopy and possible stoma closure tomorrow  -No pulmonary indication for antibiotics or steroids increase at this time.   -Pulmonary will continue to follow

## 2019-07-01 NOTE — PROGRESS NOTE ADULT - SUBJECTIVE AND OBJECTIVE BOX
Follow Up:      Inverval History/ROS:Patient is a 48y old  Male who presents with a chief complaint of Bleeding from tracheal stoma (01 Jul 2019 05:28)    No fever  Feel okay  No hemoptysis      Allergies    No Known Allergies    Intolerances        ANTIMICROBIALS:      OTHER MEDS:  acetaminophen   Tablet .. 650 milliGRAM(s) Oral every 6 hours PRN  bisacodyl Suppository 10 milliGRAM(s) Rectal daily PRN  chlorhexidine 4% Liquid 1 Application(s) Topical <User Schedule>  docusate sodium 100 milliGRAM(s) Oral two times a day  ipratropium    for Nebulization 500 MICROGram(s) Nebulizer every 6 hours  metoprolol tartrate 12.5 milliGRAM(s) Oral every 12 hours  oxyCODONE    IR 5 milliGRAM(s) Oral every 6 hours PRN  oxyCODONE    IR 10 milliGRAM(s) Oral every 6 hours PRN  pantoprazole    Tablet 40 milliGRAM(s) Oral before breakfast  polyethylene glycol 3350 17 Gram(s) Oral daily PRN  predniSONE   Tablet 5 milliGRAM(s) Oral daily  senna 2 Tablet(s) Oral at bedtime      Vital Signs Last 24 Hrs  T(C): 36.2 (01 Jul 2019 04:00), Max: 36.8 (30 Jun 2019 16:00)  T(F): 97.2 (01 Jul 2019 04:00), Max: 98.3 (30 Jun 2019 16:00)  HR: 93 (01 Jul 2019 09:46) (84 - 110)  BP: 128/99 (01 Jul 2019 07:00) (109/82 - 140/97)  BP(mean): 105 (01 Jul 2019 07:00) (85 - 109)  RR: 28 (01 Jul 2019 07:00) (19 - 35)  SpO2: 93% (01 Jul 2019 07:00) (91% - 99%)    PHYSICAL EXAM:  General: [ ] non-toxic  HEAD/EYES: [ ] PERRL [ ] white sclera [ ] icterus  ENT:  [ ] normal [ ] supple [ ] thrush [ ] pharyngeal exudate  Cardiovascular:   [ ] murmur [ ] normal [ ] PPM/AICD  Respiratory:  [ ] clear to ausculation bilaterally  GI:  [ ] soft, non-tender, normal bowel sounds  :  [ ] mock [ ] no CVA tenderness   Musculoskeletal:  [ ] no synovitis  Neurologic:  [ ] non-focal exam   Skin:  [ ] no rash  Lymph: [ ] no lymphadenopathy  Psychiatric:  [ ] appropriate affect [ ] alert & oriented  Lines:  [ ] no phlebitis [ ] central line                                10.0   14.69 )-----------( 182      ( 01 Jul 2019 05:00 )             34.6       07-01    140  |  90<L>  |  26<H>  ----------------------------<  131<H>  4.9   |  42<H>  |  0.92    Ca    10.4      01 Jul 2019 05:00  Phos  2.7     07-01  Mg     2.1     07-01            MICROBIOLOGY:    RADIOLOGY: Follow Up:      Inverval History/ROS:Patient is a 48y old  Male who presents with a chief complaint of Bleeding from tracheal stoma (01 Jul 2019 05:28)    No fever  Feel okay  No hemoptysis      Allergies    No Known Allergies    Intolerances        ANTIMICROBIALS:      OTHER MEDS:  acetaminophen   Tablet .. 650 milliGRAM(s) Oral every 6 hours PRN  bisacodyl Suppository 10 milliGRAM(s) Rectal daily PRN  chlorhexidine 4% Liquid 1 Application(s) Topical <User Schedule>  docusate sodium 100 milliGRAM(s) Oral two times a day  ipratropium    for Nebulization 500 MICROGram(s) Nebulizer every 6 hours  metoprolol tartrate 12.5 milliGRAM(s) Oral every 12 hours  oxyCODONE    IR 5 milliGRAM(s) Oral every 6 hours PRN  oxyCODONE    IR 10 milliGRAM(s) Oral every 6 hours PRN  pantoprazole    Tablet 40 milliGRAM(s) Oral before breakfast  polyethylene glycol 3350 17 Gram(s) Oral daily PRN  predniSONE   Tablet 5 milliGRAM(s) Oral daily  senna 2 Tablet(s) Oral at bedtime      Vital Signs Last 24 Hrs  T(C): 36.2 (01 Jul 2019 04:00), Max: 36.8 (30 Jun 2019 16:00)  T(F): 97.2 (01 Jul 2019 04:00), Max: 98.3 (30 Jun 2019 16:00)  HR: 93 (01 Jul 2019 09:46) (84 - 110)  BP: 128/99 (01 Jul 2019 07:00) (109/82 - 140/97)  BP(mean): 105 (01 Jul 2019 07:00) (85 - 109)  RR: 28 (01 Jul 2019 07:00) (19 - 35)  SpO2: 93% (01 Jul 2019 07:00) (91% - 99%)    PHYSICAL EXAM:  General: [x ] non-toxic  HEAD/EYES: [ ] PERRL [x ] white sclera [ ] icterus  ENT:  [ ] normal [ ] supple [ ] thrush [ x] stoma  Cardiovascular:   [ ] murmur [x ] normal [ ] PPM/AICD  Respiratory:  [ ] clear to ausculation bilaterally  GI:  [x ] soft, non-tender, normal bowel sounds  :  [ ] mock [ ] no CVA tenderness   Musculoskeletal:  [ x] no synovitis  Neurologic:  [ ] non-focal exam   Skin:  [x ] no rash  Lymph: [x ] no lymphadenopathy  Psychiatric:  [ ] appropriate affect [ x] alert & oriented  Lines:  [x ] no phlebitis [ ] central line                                10.0   14.69 )-----------( 182      ( 01 Jul 2019 05:00 )             34.6       07-01    140  |  90<L>  |  26<H>  ----------------------------<  131<H>  4.9   |  42<H>  |  0.92    Ca    10.4      01 Jul 2019 05:00  Phos  2.7     07-01  Mg     2.1     07-01            MICROBIOLOGY:    RADIOLOGY:

## 2019-07-01 NOTE — CONSULT NOTE ADULT - SUBJECTIVE AND OBJECTIVE BOX
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      Pt had multiple prolonged hospitalizations over the past few years. In 9/2017 pt initially presented with leg spasms, headache, and acute facial droop and found to have multiple bilateral areas of acute infarction without hemorrhagic transformation s/p intubation. He was extubated but then re-intubated for hemoptysis 2/2 DAVID bleeding s/p IR embolization of left bronchial artery followed by persistent and worsening hemoptysis s/p emergent OR left thoracotomy with DAVID resection followed by PTX with persistent air leak followed by hemorrhagic conversion with failed talc pleurodesis and blood patch. He was also found to have lung aspergillosis s/p course of Voriconazole (through 11/7). He returned to the hospital 11/8/17-1/10/18 for recurrent hemoptysis after which he underwent an IR embolization 11/10 for bleeding from his staple line, which was the presumed source of bleeding. He underwent multiple bronchoscopic evaluations and IR-guided ablation before the hemoptysis resolved. He failed extubated and had tracheostomy placed in MICU 11/24/17. Hospital course was further c/b Staph epidermidis bacteremia. Pt had persistent bleeding now from trach site. 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 On 11/29/17 he had worsening respiratory status with     11/8/2017-1/10/2018  and prior to that 11/2/17-9/11/17.    Pt then had multiple hospitalizations for hemoptysis and/or respiratory distress. Most current hospitalization 4/27-5/1 for hemoptysis thought to be 2/2 pseodomonas PNA, s/p 2 week course Zosyn via PICC (4/27-5/10). Prior to that pt was treated for PNA with shorter 5 day course of Abx. In 2/2018 he was admitted to MICU/RCU for hypoxemic respiratory failure c/b VRE bacteremia with tracheitis s/p course of Ampicillin. Prior to that, in had CDiff colitis (1/2018) s/p course of Vancomycin/Flagyl.      PAST MEDICAL & SURGICAL HISTORY:  History of pneumothorax: History of 3 prior pneumonthoracies.  COPD (chronic obstructive pulmonary disease)  Asthma  Hypertension  Sarcoidosis  History of thoracotomy: 9/24/17 Left thoracotomy, Left upper lobectomy   9/25/17 Reop left thoracotomy, evacuation of left hemothorax      FAMILY HISTORY:  Family history of pancreatic cancer  Family history of essential hypertension      SOCIAL HISTORY:  Smoking: [ ] Never Smoked [ ] Former Smoker (__ packs x ___ years) [ ] Current Smoker  (__ packs x ___ years)  Substance Use: [ ] Never Used [ ] Used ____  EtOH Use:  Marital Status: [ ] Single [ ]  [ ]  [ ]   Sexual History:   Occupation:  Recent Travel:  Country of Birth:  Advance Directives:    Allergies    No Known Allergies    Intolerances        HOME MEDICATIONS:  Home Medications:  Incruse Ellipta 62.5 mcg/inh inhalation powder: 1 inhaler(s) inhaled once a day (27 Apr 2019 21:46)  metoprolol succinate 25 mg oral tablet, extended release: 1 tab(s) orally once a day (27 Apr 2019 21:46)  predniSONE 5 mg oral tablet: 1 tab(s) orally once a day (27 Apr 2019 21:46)  Ventolin HFA 90 mcg/inh inhalation aerosol: 2 puff(s) inhaled 4 times a day, As Needed (27 Apr 2019 21:46)  Zosyn 3 g-0.375 g intravenous injection: 3.375 gram(s) intravenous every 8 hours through 5/11/19 (03 May 2019 07:43)      REVIEW OF SYSTEMS:  Constitutional: [ ] negative [ ] fevers [ ] chills [ ] weight loss [ ] weight gain  HEENT: [ ] negative [ ] dry eyes [ ] eye irritation [ ] postnasal drip [ ] nasal congestion  CV: [ ] negative  [ ] chest pain [ ] orthopnea [ ] palpitations [ ] murmur  Resp: [ ] negative [ ] cough [ ] shortness of breath [ ] dyspnea [ ] wheezing [ ] sputum [ ] hemoptysis  GI: [ ] negative [ ] nausea [ ] vomiting [ ] diarrhea [ ] constipation [ ] abd pain [ ] dysphagia   : [ ] negative [ ] dysuria [ ] nocturia [ ] hematuria [ ] increased urinary frequency  Musculoskeletal: [ ] negative [ ] back pain [ ] myalgias [ ] arthralgias [ ] fracture  Skin: [ ] negative [ ] rash [ ] itch  Neurological: [ ] negative [ ] headache [ ] dizziness [ ] syncope [ ] weakness [ ] numbness  Psychiatric: [ ] negative [ ] anxiety [ ] depression  Endocrine: [ ] negative [ ] diabetes [ ] thyroid problem  Hematologic/Lymphatic: [ ] negative [ ] anemia [ ] bleeding problem  Allergic/Immunologic: [ ] negative [ ] itchy eyes [ ] nasal discharge [ ] hives [ ] angioedema  [ ] All other systems negative  [ ] Unable to assess ROS because ________    OBJECTIVE:  ICU Vital Signs Last 24 Hrs  T(C): 36.2 (01 Jul 2019 04:00), Max: 36.8 (30 Jun 2019 16:00)  T(F): 97.2 (01 Jul 2019 04:00), Max: 98.3 (30 Jun 2019 16:00)  HR: 122 (01 Jul 2019 12:00) (84 - 122)  BP: 134/9 (01 Jul 2019 12:00) (109/82 - 140/97)  BP(mean): 103 (01 Jul 2019 12:00) (85 - 109)  ABP: --  ABP(mean): --  RR: 35 (01 Jul 2019 12:00) (19 - 35)  SpO2: 92% (01 Jul 2019 12:00) (91% - 99%)        06-30 @ 07:01  -  07-01 @ 07:00  --------------------------------------------------------  IN: 300 mL / OUT: 975 mL / NET: -675 mL    07-01 @ 07:01 - 07-01 @ 13:02  --------------------------------------------------------  IN: 0 mL / OUT: 150 mL / NET: -150 mL      CAPILLARY BLOOD GLUCOSE          PHYSICAL EXAM:  General:   HEENT:   Lymph Nodes:  Neck:   Respiratory:   Cardiovascular:   Abdomen:   Extremities:   Skin:   Neurological:  Psychiatry:    HOSPITAL MEDICATIONS:  Standing Meds:  chlorhexidine 4% Liquid 1 Application(s) Topical <User Schedule>  docusate sodium 100 milliGRAM(s) Oral two times a day  ipratropium    for Nebulization 500 MICROGram(s) Nebulizer every 6 hours  metoprolol tartrate 12.5 milliGRAM(s) Oral every 12 hours  pantoprazole    Tablet 40 milliGRAM(s) Oral before breakfast  predniSONE   Tablet 5 milliGRAM(s) Oral daily  senna 2 Tablet(s) Oral at bedtime      PRN Meds:  acetaminophen   Tablet .. 650 milliGRAM(s) Oral every 6 hours PRN  bisacodyl Suppository 10 milliGRAM(s) Rectal daily PRN  oxyCODONE    IR 5 milliGRAM(s) Oral every 6 hours PRN  oxyCODONE    IR 10 milliGRAM(s) Oral every 6 hours PRN  polyethylene glycol 3350 17 Gram(s) Oral daily PRN      LABS:                        10.0   14.69 )-----------( 182      ( 01 Jul 2019 05:00 )             34.6     Hgb Trend: 10.0<--, 10.0<--, 10.5<--  07-01    140  |  90<L>  |  26<H>  ----------------------------<  131<H>  4.9   |  42<H>  |  0.92    Ca    10.4      01 Jul 2019 05:00  Phos  2.7     07-01  Mg     2.1     07-01      Creatinine Trend: 0.92<--, 0.78<--, 0.89<--  PT/INR - ( 30 Jun 2019 03:10 )   PT: 12.2 SEC;   INR: 1.07          PTT - ( 30 Jun 2019 03:10 )  PTT:26.3 SEC          MICROBIOLOGY:       RADIOLOGY:  [ ] Reviewed and interpreted by me    PULMONARY FUNCTION TESTS:    EKG: CHIEF COMPLAINT: Hemoptysis     HPI:    Pt is a 48M with PMHx sarcoidosis, aspergillosis, PFO s/p CVA with residual left hemiplegia, paroxysmal Afib (not on A/C), and hx DAVID bleeding with hemoptysis s/p emergent thoracotomy with DAVID lobectomy (9/24/17) s/p IR embolization of left bronchial artery (11/16/17) c/b PTX s/p chest tube placement and tracheostomy placement (11/17) for persistent hypoxemic respiratory failure further c/b broncho-pulmonary fistula/lung herniation, hx severe CDiff colitis (1/2018), and hx thyroid arterial pseudoaneurysm now presenting to [facility] from OSH for recurrent hemoptysis.      Pt had multiple prolonged hospitalizations over the past few years. In 9/2017 pt initially presented with leg spasms, headache, and acute facial droop and found to have multiple bilateral areas of acute infarction without hemorrhagic transformation s/p intubation. He was extubated but then re-intubated for hemoptysis 2/2 DAVID bleeding s/p IR embolization of left bronchial artery followed by persistent and worsening hemoptysis s/p emergent OR left thoracotomy with DAVID resection followed by PTX with persistent air leak followed by hemorrhagic conversion with failed talc pleurodesis and blood patch. He was also found to have lung aspergillosis s/p course of Voriconazole (through 11/7). He returned to the hospital 11/8/17-1/10/18 for recurrent hemoptysis after which he underwent an IR embolization 11/10 for bleeding from his staple line, which was the presumed source of bleeding. He underwent multiple bronchoscopic evaluations and IR-guided ablation before the hemoptysis resolved. He failed extubated and had tracheostomy placed in MICU 11/24/17. Hospital course was further c/b Staph epidermidis bacteremia. Pt had persistent bleeding now from trach site. 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 On 11/29/17 he had worsening respiratory status with concern for development of a bronchopleural fistula without further intervention. A chest tube placed prior fell out 12/3 and was not replaced. Pt able to wean off vent on CPAP/TC and was d/c'ed to vent facility. Since then he was ultimately extubated to TC and then decannulated 4/8.     Pt then had multiple hospitalizations for hemoptysis and/or respiratory distress. Most current hospitalization 4/27-5/1 for hemoptysis thought to be 2/2 pseudomonas PNA, s/p 2 week course Zosyn via PICC (4/27-5/10). Prior to that pt was treated for PNA with shorter 5 day course of Abx. In 2/2018 he was admitted to MICU/RCU for hypoxemic respiratory failure c/b VRE bacteremia with tracheitis s/p course of Ampicillin. Prior to that, in had CDiff colitis (1/2018) s/p course of Vancomycin/Flagyl. 9/24/17 surgical bx showed a DAVID lobectomy with aspergillosis in a background of fibrosis, chronic inflammation, non necrotizing granulomas and bronchiolitis. Prior sputum cx (+) Pseudomonas and MRSA. Most recent blood cultures (-).     Pt presents to [facility] as a transfer from [facility] following acute bleeding from tracheal stoma. Silver nitrate was used at stoma site to help with bleeding, with some improvement. Pt was also found to have (+) Candida auris groin swab. Plan for diagnostic bronchoscopy with CTSx tomorrow in OR.     PAST MEDICAL & SURGICAL HISTORY:  History of pneumothorax: History of 3 prior pneumonthoracies.  COPD (chronic obstructive pulmonary disease)  Asthma  Hypertension  Sarcoidosis  History of thoracotomy: 9/24/17 Left thoracotomy, Left upper lobectomy   9/25/17 Reop left thoracotomy, evacuation of left hemothorax      FAMILY HISTORY:  Family history of pancreatic cancer  Family history of essential hypertension      Allergies    No Known Allergies    Intolerances        HOME MEDICATIONS:  Home Medications:  Incruse Ellipta 62.5 mcg/inh inhalation powder: 1 inhaler(s) inhaled once a day (27 Apr 2019 21:46)  metoprolol succinate 25 mg oral tablet, extended release: 1 tab(s) orally once a day (27 Apr 2019 21:46)  predniSONE 5 mg oral tablet: 1 tab(s) orally once a day (27 Apr 2019 21:46)  Ventolin HFA 90 mcg/inh inhalation aerosol: 2 puff(s) inhaled 4 times a day, As Needed (27 Apr 2019 21:46)  Zosyn 3 g-0.375 g intravenous injection: 3.375 gram(s) intravenous every 8 hours through 5/11/19 (03 May 2019 07:43)      REVIEW OF SYSTEMS:  Constitutional: X[ ] negative [ ] fevers [ ] chills [ ] weight loss [ ] weight gain  HEENT: [ ] negative [ ] dry eyes [ ] eye irritation [ ] postnasal drip [ ] nasal congestion  CV: [X ] negative  [ ] chest pain [ ] orthopnea [ ] palpitations [ ] murmur  Resp: [ ] negative [ ] cough [X ] shortness of breath [ ] dyspnea [ ] wheezing [ ] sputum [X ] hemoptysis  GI: [ ] negative [ ] nausea [ ] vomiting [ ] diarrhea [ ] constipation [ ] abd pain [ ] dysphagia   : [ ] negative [ ] dysuria [ ] nocturia [ ] hematuria [ ] increased urinary frequency  Musculoskeletal: [ ] negative [ ] back pain [ ] myalgias [ ] arthralgias [ ] fracture  Skin: [ ] negative [ ] rash [ ] itch  Neurological: [X ] negative [ ] headache [ ] dizziness [ ] syncope [ ] weakness [ ] numbness  Psychiatric: [ X] negative [ ] anxiety [ ] depression  Endocrine: [X ] negative [ ] diabetes [ ] thyroid problem  Hematologic/Lymphatic: [ ] negative [ ] anemia [ ] bleeding problem  Allergic/Immunologic: [ ] negative [ ] itchy eyes [ ] nasal discharge [ ] hives [ ] angioedema  [X ] All other systems negative  [ ] Unable to assess ROS because ________    OBJECTIVE:  ICU Vital Signs Last 24 Hrs  T(C): 36.2 (01 Jul 2019 04:00), Max: 36.8 (30 Jun 2019 16:00)  T(F): 97.2 (01 Jul 2019 04:00), Max: 98.3 (30 Jun 2019 16:00)  HR: 122 (01 Jul 2019 12:00) (84 - 122)  BP: 134/9 (01 Jul 2019 12:00) (109/82 - 140/97)  BP(mean): 103 (01 Jul 2019 12:00) (85 - 109)  ABP: --  ABP(mean): --  RR: 35 (01 Jul 2019 12:00) (19 - 35)  SpO2: 92% (01 Jul 2019 12:00) (91% - 99%)        06-30 @ 07:01  -  07-01 @ 07:00  --------------------------------------------------------  IN: 300 mL / OUT: 975 mL / NET: -675 mL    07-01 @ 07:01 - 07-01 @ 13:02  --------------------------------------------------------  IN: 0 mL / OUT: 150 mL / NET: -150 mL      CAPILLARY BLOOD GLUCOSE          PHYSICAL EXAM:  General:   HEENT:   Lymph Nodes:  Neck: Tracheal stoma, c/d/i. No active bleeding or discharge noted.   Respiratory: CTA b/l. (-) wheezing/rhonchi/rales. Pt only able to vocalize by closing stoma or turning head to one side   Cardiovascular: RRR, S1/S2, (-) m/g/r  Abdomen: NT/ND  Extremities: (-) pitting edema/cyanosis/clubbing  Skin: c/d/i  Neurological: Non-focal findings  Psychiatry: Appropriate     HOSPITAL MEDICATIONS:  Standing Meds:  chlorhexidine 4% Liquid 1 Application(s) Topical <User Schedule>  docusate sodium 100 milliGRAM(s) Oral two times a day  ipratropium    for Nebulization 500 MICROGram(s) Nebulizer every 6 hours  metoprolol tartrate 12.5 milliGRAM(s) Oral every 12 hours  pantoprazole    Tablet 40 milliGRAM(s) Oral before breakfast  predniSONE   Tablet 5 milliGRAM(s) Oral daily  senna 2 Tablet(s) Oral at bedtime      PRN Meds:  acetaminophen   Tablet .. 650 milliGRAM(s) Oral every 6 hours PRN  bisacodyl Suppository 10 milliGRAM(s) Rectal daily PRN  oxyCODONE    IR 5 milliGRAM(s) Oral every 6 hours PRN  oxyCODONE    IR 10 milliGRAM(s) Oral every 6 hours PRN  polyethylene glycol 3350 17 Gram(s) Oral daily PRN      LABS:                        10.0   14.69 )-----------( 182      ( 01 Jul 2019 05:00 )             34.6     Hgb Trend: 10.0<--, 10.0<--, 10.5<--  07-01    140  |  90<L>  |  26<H>  ----------------------------<  131<H>  4.9   |  42<H>  |  0.92    Ca    10.4      01 Jul 2019 05:00  Phos  2.7     07-01  Mg     2.1     07-01      Creatinine Trend: 0.92<--, 0.78<--, 0.89<--  PT/INR - ( 30 Jun 2019 03:10 )   PT: 12.2 SEC;   INR: 1.07          PTT - ( 30 Jun 2019 03:10 )  PTT:26.3 SEC          MICROBIOLOGY:       RADIOLOGY:  [ ] Reviewed and interpreted by me    PULMONARY FUNCTION TESTS:    EKG:

## 2019-07-01 NOTE — PROGRESS NOTE ADULT - SUBJECTIVE AND OBJECTIVE BOX
OSCAR CHACKO            MRN-[id number]         No Known Allergies                 HPI:  48 year old man with sarcoidosis, pulmonary fibrosis, hx/o aspergilloma s/p DAVID lobectomy secondary to hemoptysis transferred from [facility] for bleeding at trach stoma. Pt had trach inserted as a part of a complicated post op course following Lobectomy. Pt admitted to OSH with concerns for sepsis, was started on Vanco/Cefepime. Pt was remained hemodynimcally stable throughout stay with exception of some tachycardia. Pt was seen by general surgery and the tracheal stoma was cauterized using silver nitrate. Pt was transferred to [facility] for surgical evaluation of bleeding.  Of note- when patient was received at [facility], EMS stated pt was on isolation for a positive Candida auris test. Called [facility] and spoke to the nurse previosuly taking care of patient- patient did indeed have a positive Candida auris swab from ether the groin, axilla or king.  Attempted to reach attending regarding more details, awaiting callback. (29 Jun 2019 12:19)      Procedure:     [id number]:  Intubated fro airway protection  [id number]:  Extubated  [id number]:  FOB 2/2 large hemoptysis  [id number]:   Left VATS and DAVID lobectomy 9/24/17   [id number]:   Re-op L thoracotomy, evacuation of hemothorax (600cc clot, 400cc blood) no obvious source of bleeding      Re-op L thoracotomy, evacuation of hemothorax; RTOR x2 100cc clot; 200cc blood: bleeding posterior intercostal arteries identified and cauterized 9/25/17   [id number]:   L Pigtail catheter (IR) for L apical PTX.  (Transferred back to ICU 2/2 increased SOB)  [id number]   Bedside talc pleurodesis    Pt eventually discharged to rehab - readmitted on 138027    [id number]:   Bronchoscopy with bronchoalveolar lavage  in OR- Remained intubated  [id number]:   Repeat bronchoscopy in ICU for persistent hemoptysis via ETT; bronchial block placement into left main bronchus  [id number]:   IR left bronchial artery embolization, Bronchoscopy with BAL post IR embolization - w/o evidence of active bleeding  [id number]:   Repeat bronchoscopy in ICU  via ETT; No evidence of active bleeding. Old clots  11/20/17 Bronchoscopy - cleared old clots      Issues:  Hemoptysis / Bleeding from tracheostomy site              Sarcoidosis  Asthma / COPD              PFO               Anemia  Hx of CVA  with L-side weakness  Candida Auris colonization  HTN               Home Medications:  Incruse Ellipta 62.5 mcg/inh inhalation powder: 1 inhaler(s) inhaled once a day (27 Apr 2019 21:46)  metoprolol succinate 25 mg oral tablet, extended release: 1 tab(s) orally once a day (27 Apr 2019 21:46)  predniSONE 5 mg oral tablet: 1 tab(s) orally once a day (27 Apr 2019 21:46)  Ventolin HFA 90 mcg/inh inhalation aerosol: 2 puff(s) inhaled 4 times a day, As Needed (27 Apr 2019 21:46)  Zosyn 3 g-0.375 g intravenous injection: 3.375 gram(s) intravenous every 8 hours through 5/11/19 (03 May 2019 07:43)      PAST MEDICAL & SURGICAL HISTORY:  History of pneumothorax: History of 3 prior pneumonthoracies.  COPD (chronic obstructive pulmonary disease)  Asthma  Hypertension  Sarcoidosis  History of thoracotomy: 9/24/17 Left thoracotomy, Left upper lobectomy   9/25/17 Reop left thoracotomy, evacuation of left hemothorax        ICU Vital Signs Last 24 Hrs  T(C): 36.2 (01 Jul 2019 04:00), Max: 36.8 (30 Jun 2019 16:00)  T(F): 97.2 (01 Jul 2019 04:00), Max: 98.3 (30 Jun 2019 16:00)  HR: 95 (01 Jul 2019 04:00) (86 - 110)  BP: 109/82 (01 Jul 2019 04:00) (105/82 - 140/97)  BP(mean): 88 (01 Jul 2019 04:00) (85 - 109)  ABP: --  ABP(mean): --  RR: 22 (01 Jul 2019 04:00) (19 - 35)  SpO2: 99% (01 Jul 2019 04:00) (89% - 99%)    I&O's Detail    29 Jun 2019 07:01  -  30 Jun 2019 07:00  --------------------------------------------------------  IN:    IV PiggyBack: 100 mL  Total IN: 100 mL    OUT:    Voided: 375 mL  Total OUT: 375 mL    Total NET: -275 mL      30 Jun 2019 07:01  -  01 Jul 2019 05:28  --------------------------------------------------------  IN:    IV PiggyBack: 300 mL  Total IN: 300 mL    OUT:    Voided: 700 mL  Total OUT: 700 mL    Total NET: -400 mL        CAPILLARY BLOOD GLUCOSE          Home Medications:  Incruse Ellipta 62.5 mcg/inh inhalation powder: 1 inhaler(s) inhaled once a day (27 Apr 2019 21:46)  metoprolol succinate 25 mg oral tablet, extended release: 1 tab(s) orally once a day (27 Apr 2019 21:46)  predniSONE 5 mg oral tablet: 1 tab(s) orally once a day (27 Apr 2019 21:46)  Ventolin HFA 90 mcg/inh inhalation aerosol: 2 puff(s) inhaled 4 times a day, As Needed (27 Apr 2019 21:46)  Zosyn 3 g-0.375 g intravenous injection: 3.375 gram(s) intravenous every 8 hours through 5/11/19 (03 May 2019 07:43)      MEDICATIONS  (STANDING):  chlorhexidine 4% Liquid 1 Application(s) Topical <User Schedule>  docusate sodium 100 milliGRAM(s) Oral two times a day  ipratropium    for Nebulization 500 MICROGram(s) Nebulizer every 6 hours  methylPREDNISolone sodium succinate Injectable 40 milliGRAM(s) IV Push every 8 hours  metoprolol tartrate 12.5 milliGRAM(s) Oral every 12 hours  pantoprazole    Tablet 40 milliGRAM(s) Oral before breakfast  senna 2 Tablet(s) Oral at bedtime    MEDICATIONS  (PRN):  acetaminophen   Tablet .. 650 milliGRAM(s) Oral every 6 hours PRN Temp greater or equal to 38C (100.4F), Mild Pain (1 - 3)  bisacodyl Suppository 10 milliGRAM(s) Rectal daily PRN Constipation  oxyCODONE    IR 5 milliGRAM(s) Oral every 6 hours PRN Moderate Pain (4 - 6)  oxyCODONE    IR 10 milliGRAM(s) Oral every 6 hours PRN Severe Pain (7 - 10)  polyethylene glycol 3350 17 Gram(s) Oral daily PRN Constipation          Physical exam:                             General:               Pt is awake, alert,  not in any distress                                                  Neuro:                  Nonfocal                             Cardiovascular:   S1 & S2, regular                           Respiratory:         Air entry is fair and equal on both sides, has bilateral conducted sounds                           GI:                          Soft, nondistended and nontender, Bowel sounds active                            Ext:                        No cyanosis or edema     Labs:                                                                           10.0   11.02 )-----------( 180      ( 30 Jun 2019 03:10 )             35.3             06-30    141  |  92<L>  |  26<H>  ----------------------------<  150<H>  4.8   |  41<H>  |  0.78    Ca    10.2      30 Jun 2019 03:10  Phos  2.9     06-30  Mg     2.1     06-30                    PT/INR - ( 30 Jun 2019 03:10 )   PT: 12.2 SEC;   INR: 1.07          PTT - ( 30 Jun 2019 03:10 )  PTT:26.3 SEC        CXR:    < from: Xray Chest 1 View- PORTABLE-Routine (06.30.19 @ 07:03) >  Heart size is difficultto evaluate on this portable chest   x-ray. There is a loculated left upper hemithorax pneumothorax as on the   prior study. Adjacent areas of atelectasis and or scarring are again   noted. Right apical cysts are also noted unchanged. There are no new   opacities.        Plan:    General: 48 year old man with sarcoidosis, pulmonary fibrosis, hx/o aspergilloma s/p DAVID lobectomy secondary to hemoptysis transferred from [facility] for bleeding at trach stoma. Pt had trach inserted as a part of a complicated post op course following Lobectomy. Pt admitted to OSH with concerns for sepsis, was started on Vanco/Cefepime. Pt was remained hemodynimcally stable throughout stay with exception of some tachycardia. Pt was seen by general surgery and the tracheal stoma was cauterized using silver nitrate. Pt was transferred to [facility] for surgical evaluation of bleeding.                            Neuro:                                         Pain control with Oxycodone / Tylenol IV                            Cardiovascular:                                          Continue hemodynamic monitoring.    Hx of PFO / A-fib - Was on anticoagulation but stopped because of hemoptysis    A-fib - rate control with Lopressor 12.5mg BID                            Respiratory:                                         Pt is on 2L  nasal canula                                          Comfortable, not in any distress.                                         Encourage incentive spirometry                                          Pt has blood tinged secretions coming out from tracheostomy site. Will d/w CTSx for bronch                                                                Continue bronchodilators, pulmonary toilet.     Sarcoidosis -  Was on prednisone at home, switched to solumedrol     ? Pneumonia - Completed 7 days of antibiotics Vanco + Cefepime                              GI                                                                                  Continue GI prophylaxis with Protonix                                         Continue Zofran / Reglan for nausea - PRN	                                                                 Renal:                                         Continue LR 30cc/hr                                         Monitor I/Os and electrolytes                                                                                        Hem/ Onc:                                                                                   Follow CBC in AM                           Infectious disease:      ? Pneumonia - Completed 7 days of antibiotics Vanco + Cefepime. D/W I.D      Reported to be positive for Candida auris from surveillance swabs - will remain in  isolation during hospitalization                                          Monitor for fever / leukocytosis.                                                                    Endocrine                                             Continue Accu-Checks with coverage    Pt is on SQ Heparin and Venodyne boots for DVT prophylaxis.     Pertinent clinical, laboratory, radiographic, hemodynamic, echocardiographic, respiratory data, microbiologic data and chart were reviewed and analyzed frequently throughout the course of the day and night  Patient seen, examined and plan discussed with CT Surgeon Dr. Alvarado / CTICU team during rounds.    Pt's status discussed with family at bedside, updated status            Len Kim MD

## 2019-07-02 ENCOUNTER — APPOINTMENT (OUTPATIENT)
Dept: THORACIC SURGERY | Facility: HOSPITAL | Age: 49
End: 2019-07-02

## 2019-07-02 LAB
ANION GAP SERPL CALC-SCNC: 9 MMO/L — SIGNIFICANT CHANGE UP (ref 7–14)
APTT BLD: 29.1 SEC — SIGNIFICANT CHANGE UP (ref 27.5–36.3)
BASE EXCESS BLDA CALC-SCNC: 22.1 MMOL/L — SIGNIFICANT CHANGE UP
BASE EXCESS BLDA CALC-SCNC: 24.1 MMOL/L — SIGNIFICANT CHANGE UP
BASE EXCESS BLDV CALC-SCNC: 24.3 MMOL/L — SIGNIFICANT CHANGE UP
BLOOD GAS VENOUS - CREATININE: 0.84 MG/DL — SIGNIFICANT CHANGE UP (ref 0.5–1.3)
BUN SERPL-MCNC: 26 MG/DL — HIGH (ref 7–23)
CALCIUM SERPL-MCNC: 10.4 MG/DL — SIGNIFICANT CHANGE UP (ref 8.4–10.5)
CHLORIDE BLDA-SCNC: 90 MMOL/L — LOW (ref 96–108)
CHLORIDE BLDA-SCNC: 93 MMOL/L — LOW (ref 96–108)
CHLORIDE BLDV-SCNC: 89 MMOL/L — LOW (ref 96–108)
CHLORIDE SERPL-SCNC: 90 MMOL/L — LOW (ref 98–107)
CO2 SERPL-SCNC: 43 MMOL/L — HIGH (ref 22–31)
CREAT BLDA-MCNC: 0.75 MG/DL — SIGNIFICANT CHANGE UP (ref 0.5–1.3)
CREAT BLDA-MCNC: 0.77 MG/DL — SIGNIFICANT CHANGE UP (ref 0.5–1.3)
CREAT SERPL-MCNC: 0.91 MG/DL — SIGNIFICANT CHANGE UP (ref 0.5–1.3)
GAS PNL BLDV: 140 MMOL/L — SIGNIFICANT CHANGE UP (ref 136–146)
GLUCOSE BLDA-MCNC: 104 MG/DL — HIGH (ref 70–99)
GLUCOSE BLDA-MCNC: 96 MG/DL — SIGNIFICANT CHANGE UP (ref 70–99)
GLUCOSE BLDV-MCNC: 89 MG/DL — SIGNIFICANT CHANGE UP (ref 70–99)
GLUCOSE SERPL-MCNC: 87 MG/DL — SIGNIFICANT CHANGE UP (ref 70–99)
GRAM STN SPT: SIGNIFICANT CHANGE UP
HCO3 BLDA-SCNC: 45 MMOL/L — CRITICAL HIGH (ref 22–26)
HCO3 BLDA-SCNC: 45 MMOL/L — CRITICAL HIGH (ref 22–26)
HCO3 BLDV-SCNC: 44 MMOL/L — HIGH (ref 20–27)
HCT VFR BLD CALC: 38.7 % — LOW (ref 39–50)
HCT VFR BLDA CALC: 31.7 % — LOW (ref 39–51)
HCT VFR BLDA CALC: 34.1 % — LOW (ref 39–51)
HCT VFR BLDV CALC: 34.8 % — LOW (ref 39–51)
HGB BLD-MCNC: 10.9 G/DL — LOW (ref 13–17)
HGB BLDA-MCNC: 10.2 G/DL — LOW (ref 13–17)
HGB BLDA-MCNC: 11 G/DL — LOW (ref 13–17)
HGB BLDV-MCNC: 11.3 G/DL — LOW (ref 13–17)
INR BLD: 1.04 — SIGNIFICANT CHANGE UP (ref 0.88–1.17)
LACTATE BLDA-SCNC: 0.6 MMOL/L — SIGNIFICANT CHANGE UP (ref 0.5–2)
LACTATE BLDA-SCNC: 1.2 MMOL/L — SIGNIFICANT CHANGE UP (ref 0.5–2)
LACTATE BLDV-MCNC: 1.1 MMOL/L — SIGNIFICANT CHANGE UP (ref 0.5–2)
MAGNESIUM SERPL-MCNC: 2.2 MG/DL — SIGNIFICANT CHANGE UP (ref 1.6–2.6)
MCHC RBC-ENTMCNC: 27 PG — SIGNIFICANT CHANGE UP (ref 27–34)
MCHC RBC-ENTMCNC: 28.2 % — LOW (ref 32–36)
MCV RBC AUTO: 95.8 FL — SIGNIFICANT CHANGE UP (ref 80–100)
NRBC # FLD: 0 K/UL — SIGNIFICANT CHANGE UP (ref 0–0)
PCO2 BLDA: 63 MMHG — HIGH (ref 35–48)
PCO2 BLDA: > 110 MMHG — CRITICAL HIGH (ref 35–48)
PCO2 BLDV: > 110 MMHG — CRITICAL HIGH (ref 41–51)
PH BLDA: 7.26 PH — LOW (ref 7.35–7.45)
PH BLDA: 7.49 PH — HIGH (ref 7.35–7.45)
PH BLDV: 7.22 PH — LOW (ref 7.32–7.43)
PHOSPHATE SERPL-MCNC: 3 MG/DL — SIGNIFICANT CHANGE UP (ref 2.5–4.5)
PLATELET # BLD AUTO: 175 K/UL — SIGNIFICANT CHANGE UP (ref 150–400)
PMV BLD: 12.6 FL — SIGNIFICANT CHANGE UP (ref 7–13)
PO2 BLDA: 226 MMHG — HIGH (ref 83–108)
PO2 BLDA: 89 MMHG — SIGNIFICANT CHANGE UP (ref 83–108)
PO2 BLDV: 34 MMHG — LOW (ref 35–40)
POTASSIUM BLDA-SCNC: 4.2 MMOL/L — SIGNIFICANT CHANGE UP (ref 3.4–4.5)
POTASSIUM BLDA-SCNC: 4.5 MMOL/L — SIGNIFICANT CHANGE UP (ref 3.4–4.5)
POTASSIUM BLDV-SCNC: 4.3 MMOL/L — SIGNIFICANT CHANGE UP (ref 3.4–4.5)
POTASSIUM SERPL-MCNC: 4.7 MMOL/L — SIGNIFICANT CHANGE UP (ref 3.5–5.3)
POTASSIUM SERPL-SCNC: 4.7 MMOL/L — SIGNIFICANT CHANGE UP (ref 3.5–5.3)
PROTHROM AB SERPL-ACNC: 11.9 SEC — SIGNIFICANT CHANGE UP (ref 9.8–13.1)
RBC # BLD: 4.04 M/UL — LOW (ref 4.2–5.8)
RBC # FLD: 13.8 % — SIGNIFICANT CHANGE UP (ref 10.3–14.5)
SAO2 % BLDA: 96.5 % — SIGNIFICANT CHANGE UP (ref 95–99)
SAO2 % BLDA: > 100 % — HIGH (ref 95–99)
SAO2 % BLDV: 53.7 % — LOW (ref 60–85)
SODIUM BLDA-SCNC: 137 MMOL/L — SIGNIFICANT CHANGE UP (ref 136–146)
SODIUM BLDA-SCNC: 138 MMOL/L — SIGNIFICANT CHANGE UP (ref 136–146)
SODIUM SERPL-SCNC: 142 MMOL/L — SIGNIFICANT CHANGE UP (ref 135–145)
SPECIMEN SOURCE: SIGNIFICANT CHANGE UP
WBC # BLD: 13.75 K/UL — HIGH (ref 3.8–10.5)
WBC # FLD AUTO: 13.75 K/UL — HIGH (ref 3.8–10.5)

## 2019-07-02 PROCEDURE — 99291 CRITICAL CARE FIRST HOUR: CPT

## 2019-07-02 PROCEDURE — 71045 X-RAY EXAM CHEST 1 VIEW: CPT | Mod: 26,76

## 2019-07-02 PROCEDURE — 99292 CRITICAL CARE ADDL 30 MIN: CPT

## 2019-07-02 PROCEDURE — 31622 DX BRONCHOSCOPE/WASH: CPT

## 2019-07-02 PROCEDURE — 99233 SBSQ HOSP IP/OBS HIGH 50: CPT

## 2019-07-02 PROCEDURE — 36015 PLACE CATHETER IN ARTERY: CPT

## 2019-07-02 RX ORDER — SODIUM CHLORIDE 9 MG/ML
1000 INJECTION, SOLUTION INTRAVENOUS
Refills: 0 | Status: DISCONTINUED | OUTPATIENT
Start: 2019-07-02 | End: 2019-07-04

## 2019-07-02 RX ORDER — FUROSEMIDE 40 MG
20 TABLET ORAL ONCE
Refills: 0 | Status: COMPLETED | OUTPATIENT
Start: 2019-07-02 | End: 2019-07-02

## 2019-07-02 RX ORDER — MEROPENEM 1 G/30ML
1000 INJECTION INTRAVENOUS EVERY 8 HOURS
Refills: 0 | Status: DISCONTINUED | OUTPATIENT
Start: 2019-07-02 | End: 2019-07-07

## 2019-07-02 RX ORDER — PHENYLEPHRINE HYDROCHLORIDE 10 MG/ML
0.5 INJECTION INTRAVENOUS
Qty: 40 | Refills: 0 | Status: DISCONTINUED | OUTPATIENT
Start: 2019-07-02 | End: 2019-07-07

## 2019-07-02 RX ORDER — FUROSEMIDE 40 MG
20 TABLET ORAL EVERY 12 HOURS
Refills: 0 | Status: DISCONTINUED | OUTPATIENT
Start: 2019-07-02 | End: 2019-07-02

## 2019-07-02 RX ORDER — HEPARIN SODIUM 5000 [USP'U]/ML
5000 INJECTION INTRAVENOUS; SUBCUTANEOUS EVERY 12 HOURS
Refills: 0 | Status: DISCONTINUED | OUTPATIENT
Start: 2019-07-02 | End: 2019-07-07

## 2019-07-02 RX ORDER — VANCOMYCIN HCL 1 G
1000 VIAL (EA) INTRAVENOUS EVERY 12 HOURS
Refills: 0 | Status: DISCONTINUED | OUTPATIENT
Start: 2019-07-02 | End: 2019-07-05

## 2019-07-02 RX ORDER — ACETAMINOPHEN 500 MG
1000 TABLET ORAL ONCE
Refills: 0 | Status: COMPLETED | OUTPATIENT
Start: 2019-07-02 | End: 2019-07-02

## 2019-07-02 RX ORDER — HYDROCORTISONE 20 MG
100 TABLET ORAL ONCE
Refills: 0 | Status: COMPLETED | OUTPATIENT
Start: 2019-07-02 | End: 2019-07-02

## 2019-07-02 RX ORDER — CHLORHEXIDINE GLUCONATE 213 G/1000ML
15 SOLUTION TOPICAL EVERY 12 HOURS
Refills: 0 | Status: CANCELLED | OUTPATIENT
Start: 2019-07-02 | End: 2019-07-07

## 2019-07-02 RX ORDER — PANTOPRAZOLE SODIUM 20 MG/1
40 TABLET, DELAYED RELEASE ORAL DAILY
Refills: 0 | Status: DISCONTINUED | OUTPATIENT
Start: 2019-07-02 | End: 2019-07-07

## 2019-07-02 RX ORDER — DEXMEDETOMIDINE HYDROCHLORIDE IN 0.9% SODIUM CHLORIDE 4 UG/ML
0.7 INJECTION INTRAVENOUS
Qty: 200 | Refills: 0 | Status: DISCONTINUED | OUTPATIENT
Start: 2019-07-02 | End: 2019-07-07

## 2019-07-02 RX ORDER — PROPOFOL 10 MG/ML
40 INJECTION, EMULSION INTRAVENOUS
Qty: 1000 | Refills: 0 | Status: DISCONTINUED | OUTPATIENT
Start: 2019-07-02 | End: 2019-07-07

## 2019-07-02 RX ORDER — ALBUMIN HUMAN 25 %
250 VIAL (ML) INTRAVENOUS ONCE
Refills: 0 | Status: COMPLETED | OUTPATIENT
Start: 2019-07-02 | End: 2019-07-02

## 2019-07-02 RX ADMIN — Medication 400 MILLIGRAM(S): at 20:28

## 2019-07-02 RX ADMIN — CHLORHEXIDINE GLUCONATE 15 MILLILITER(S): 213 SOLUTION TOPICAL at 17:56

## 2019-07-02 RX ADMIN — MEROPENEM 100 MILLIGRAM(S): 1 INJECTION INTRAVENOUS at 21:14

## 2019-07-02 RX ADMIN — MEROPENEM 100 MILLIGRAM(S): 1 INJECTION INTRAVENOUS at 13:51

## 2019-07-02 RX ADMIN — Medication 100 MILLIGRAM(S): at 06:15

## 2019-07-02 RX ADMIN — Medication 500 MILLILITER(S): at 22:43

## 2019-07-02 RX ADMIN — Medication 100 MILLIGRAM(S): at 21:36

## 2019-07-02 RX ADMIN — SODIUM CHLORIDE 50 MILLILITER(S): 9 INJECTION, SOLUTION INTRAVENOUS at 21:37

## 2019-07-02 RX ADMIN — Medication 500 MILLILITER(S): at 21:37

## 2019-07-02 RX ADMIN — Medication 12.5 MILLIGRAM(S): at 00:53

## 2019-07-02 RX ADMIN — PHENYLEPHRINE HYDROCHLORIDE 15.64 MICROGRAM(S)/KG/MIN: 10 INJECTION INTRAVENOUS at 10:54

## 2019-07-02 RX ADMIN — PROPOFOL 20.02 MICROGRAM(S)/KG/MIN: 10 INJECTION, EMULSION INTRAVENOUS at 20:28

## 2019-07-02 RX ADMIN — Medication 250 MILLIGRAM(S): at 13:51

## 2019-07-02 RX ADMIN — Medication 0.25 MILLIGRAM(S): at 00:53

## 2019-07-02 RX ADMIN — PANTOPRAZOLE SODIUM 40 MILLIGRAM(S): 20 TABLET, DELAYED RELEASE ORAL at 17:57

## 2019-07-02 RX ADMIN — Medication 1000 MILLIGRAM(S): at 21:00

## 2019-07-02 RX ADMIN — Medication 500 MICROGRAM(S): at 04:53

## 2019-07-02 RX ADMIN — Medication 20 MILLIGRAM(S): at 09:00

## 2019-07-02 RX ADMIN — DEXMEDETOMIDINE HYDROCHLORIDE IN 0.9% SODIUM CHLORIDE 14.6 MICROGRAM(S)/KG/HR: 4 INJECTION INTRAVENOUS at 10:54

## 2019-07-02 RX ADMIN — PROPOFOL 20.02 MICROGRAM(S)/KG/MIN: 10 INJECTION, EMULSION INTRAVENOUS at 10:54

## 2019-07-02 RX ADMIN — Medication 5 MILLIGRAM(S): at 06:14

## 2019-07-02 RX ADMIN — HEPARIN SODIUM 5000 UNIT(S): 5000 INJECTION INTRAVENOUS; SUBCUTANEOUS at 17:56

## 2019-07-02 RX ADMIN — DEXMEDETOMIDINE HYDROCHLORIDE IN 0.9% SODIUM CHLORIDE 14.6 MICROGRAM(S)/KG/HR: 4 INJECTION INTRAVENOUS at 20:28

## 2019-07-02 RX ADMIN — PHENYLEPHRINE HYDROCHLORIDE 15.64 MICROGRAM(S)/KG/MIN: 10 INJECTION INTRAVENOUS at 20:27

## 2019-07-02 RX ADMIN — CHLORHEXIDINE GLUCONATE 1 APPLICATION(S): 213 SOLUTION TOPICAL at 12:16

## 2019-07-02 NOTE — PROGRESS NOTE ADULT - SUBJECTIVE AND OBJECTIVE BOX
CHACKOOSCAR                     MRN-[id number]    HPI:  48 year old man with sarcoidosis, pulmonary fibrosis, hx/o aspergilloma s/p DAVID lobectomy secondary to hemoptysis transferred from [facility] for bleeding at trach stoma. Pt had trach inserted as a part of a complicated post op course following Lobectomy. Pt admitted to OSH with concerns for sepsis, was started on Vanco/Cefepime. Pt was remained hemodynimcally stable throughout stay with exception of some tachycardia. Pt was seen by general surgery and the tracheal stoma was cauterized using silver nitrate. Pt was transferred to [facility] for surgical evaluation of bleeding.  Of note- when patient was received at [facility], EMS stated pt was on isolation for a positive Candida auris test. Called [facility] and spoke to the nurse previosuly taking care of patient- patient did indeed have a positive Candida auris swab from ether the groin, axilla or king.  Attempted to reach attending regarding more details, awaiting callback. (29 Jun 2019 12:19)      Procedure:   S/P    bronchoscopy in ICU  via ETT; 7/2/2019        Issues:                Hypotension on pressors               Acute respiratory failure, Hypercapnia, intubated   Hemoptysis / Bleeding from tracheostomy site              Sarcoidosis  Asthma / COPD              Tracheostomy              Anemia  Hx of CVA  with L-side weakness  Candida Auris colonization  HTN              [place]           PAST MEDICAL & SURGICAL HISTORY:  History of pneumothorax: History of 3 prior pneumonthoracies.  COPD (chronic obstructive pulmonary disease)  Asthma  Hypertension  Sarcoidosis  History of thoracotomy: 9/24/17 Left thoracotomy, Left upper lobectomy   9/25/17 Reop left thoracotomy, evacuation of left hemothorax            VITAL SIGNS:  Vital Signs Last 24 Hrs  T(C): 36.8 (02 Jul 2019 16:00), Max: 36.8 (02 Jul 2019 12:00)  T(F): 98.3 (02 Jul 2019 16:00), Max: 98.3 (02 Jul 2019 16:00)  HR: 97 (02 Jul 2019 16:00) (92 - 120)  BP: 123/92 (02 Jul 2019 12:00) (90/70 - 137/93)  BP(mean): 100 (02 Jul 2019 12:00) (74 - 100)  RR: 15 (02 Jul 2019 16:00) (12 - 38)  SpO2: 99% (02 Jul 2019 16:00) (94% - 100%)    I/Os:   I&O's Detail    01 Jul 2019 07:01  -  02 Jul 2019 07:00  --------------------------------------------------------  IN:  Total IN: 0 mL    OUT:    Voided: 900 mL  Total OUT: 900 mL    Total NET: -900 mL      02 Jul 2019 07:01  -  02 Jul 2019 16:24  --------------------------------------------------------  IN:    dexmedetomidine Infusion: 119.5 mL    IV PiggyBack: 200 mL    phenylephrine   Infusion: 112.9 mL    propofol Infusion: 97.4 mL  Total IN: 529.8 mL    OUT:    Indwelling Catheter - Urethral: 1200 mL    Voided: 250 mL  Total OUT: 1450 mL    Total NET: -920.2 mL          CAPILLARY BLOOD GLUCOSE          =======================MEDICATIONS===================  MEDICATIONS  (STANDING):  chlorhexidine 4% Liquid 1 Application(s) Topical <User Schedule>  dexmedetomidine Infusion 0.7 MICROgram(s)/kG/Hr (14.595 mL/Hr) IV Continuous <Continuous>  docusate sodium 100 milliGRAM(s) Oral two times a day  furosemide    Tablet 20 milliGRAM(s) Oral every 12 hours  meropenem  IVPB 1000 milliGRAM(s) IV Intermittent every 8 hours  metoprolol tartrate 12.5 milliGRAM(s) Oral every 12 hours  pantoprazole    Tablet 40 milliGRAM(s) Oral before breakfast  phenylephrine    Infusion 0.5 MICROgram(s)/kG/Min (15.637 mL/Hr) IV Continuous <Continuous>  predniSONE   Tablet 5 milliGRAM(s) Oral daily  propofol Infusion 40 MICROgram(s)/kG/Min (20.016 mL/Hr) IV Continuous <Continuous>  senna 2 Tablet(s) Oral at bedtime  vancomycin  IVPB 1000 milliGRAM(s) IV Intermittent every 12 hours    MEDICATIONS  (PRN):  acetaminophen   Tablet .. 650 milliGRAM(s) Oral every 6 hours PRN Temp greater or equal to 38C (100.4F), Mild Pain (1 - 3)  bisacodyl Suppository 10 milliGRAM(s) Rectal daily PRN Constipation  oxyCODONE    IR 5 milliGRAM(s) Oral every 6 hours PRN Moderate Pain (4 - 6)  oxyCODONE    IR 10 milliGRAM(s) Oral every 6 hours PRN Severe Pain (7 - 10)  polyethylene glycol 3350 17 Gram(s) Oral daily PRN Constipation      =======================VENTILATOR SETTINGS===================  Mode: AC/ CMV (Assist Control/ Continuous Mandatory Ventilation)  RR (machine): 12  TV (machine): 500  FiO2: 40  PEEP: 5  MAP: 13  PIP: 46        PHYSICAL EXAM============================  General:                        Sedated, intubated  Neuro:                         Intubated, sedated  Respiratory:	Air entry fair and  bilateral conducted sounds                                           Effort even and unlabored.  CV:		Regular rate and rhythm. Normal S1/S2                                          Distal pulses present.  Abdomen:	                     Soft, non-distended. Bowel sounds present   Skin:		No rash.  Extremities:	Warm, + edema.  Palpable pulses    ============================LABS=========================                        10.9   13.75 )-----------( 175      ( 02 Jul 2019 07:40 )             38.7     07-02    142  |  90<L>  |  26<H>  ----------------------------<  87  4.7   |  43<H>  |  0.91    Ca    10.4      02 Jul 2019 07:40  Phos  3.0     07-02  Mg     2.2     07-02        PT/INR - ( 02 Jul 2019 07:40 )   PT: 11.9 SEC;   INR: 1.04          PTT - ( 02 Jul 2019 07:40 )  PTT:29.1 SEC  ABG - ( 02 Jul 2019 10:15 )  pH, Arterial: 7.49  pH, Blood: x     /  pCO2: 63    /  pO2: 226   / HCO3: 45    / Base Excess: 22.1  /  SaO2: > 100                 ============================IMAGING STUDIES=========================  < from: Xray Chest 1 View- PORTABLE-Urgent (07.02.19 @ 11:17) >  Since the last exam, an endotracheal tube has been placed and is in   satisfactory position. No complications are seen from this maneuver.      COMPARISON:  July 1 at 6:34 AM      IMPRESSION:  Status post intubation.    A/P:  48 year old man with sarcoidosis, pulmonary fibrosis, hx/o aspergilloma s/p DAVID lobectomy secondary to hemoptysis transferred from [facility] for bleeding at [facility]. Pt had trach inserted as a part of a complicated post op course following Lobectomy. Pt admitted to OSH with concerns for sepsis, was started on Vanco/Cefepime. Pt was remained hemodynimcally stable throughout stay with exception of some tachycardia. Pt was seen by general surgery and the tracheal stoma was cauterized using silver nitrate. Pt was transferred to [facility] for surgical evaluation of bleeding.                            Neuro:                                         Pain control with  Tylenol IV                                       Sedated on Propofol and Precedex , Nonfocal off sedation                          Cardiovascular:                                          Continue hemodynamic monitoring.    Hx of PFO / A-fib - Was on anticoagulation but stopped because of hemoptysis    A-fib - was on Lopressor 12.5mg BID, Hold while on pressors and hypotensive                                         Hypotension: [place]on drip keep MAP>65                          Respiratory:                                         Pt became acutely restless/AMS, ABG with hypercapnia, required urgent Intubation and vent support, A line placed for ABG/BP monitoring                                          Pt has blood tinged secretions coming out from tracheostomy site. Will d/w CTSx for bronch                                                                FB at bedside via ET.      Sarcoidosis -  Was on prednisone at home, switched to solumedrol     ? Pneumonia - ID consulted, restarted on IV antibiotics Vanco + Kat                            GI                                         NPO                                         Continue GI prophylaxis with Protonix                                         Continue Zofran / Reglan for nausea - PRN	                                                                 Renal:                                         Continue IVF                                         Monitor I/Os and electrolytes                                                                                        Hem/ Onc:                                                                                   Follow CBC in AM                           Infectious disease:      ? Pneumonia - Completed 7 days of antibiotics Vanco + Cefepime. D/W I.D      Reported to be positive for Candida auris from surveillance swabs - Re-cultured per ID,will remain in  isolation during hospitalization                                          Monitor for fever / leukocytosis.                                                                    Endocrine                                             Continue Accu-Checks with coverage    Pt is on SQ Heparin and Venodyne boots for DVT prophylaxis.     Pertinent clinical, laboratory, radiographic, hemodynamic, echocardiographic, respiratory data, microbiologic data and chart were reviewed and analyzed frequently throughout the course of the day and night  Patient seen, examined and plan discussed with CT Surgeon Dr. Caldera / CTICU team during rounds.    Pt's status discussed with family at bedside, updated status  I spent 80 minutes at bedside providing critical care from 7am to 11pm    Flor Pepper DO, FACEP

## 2019-07-02 NOTE — PROGRESS NOTE ADULT - ASSESSMENT
48 year old with history of sarcoidosis presents with reecent episode of hemoptysis from stoma    Tx for pneumonia at outside hospital    Colinzed with candida auris    Critically ill with new hypercapneic respiratory failure    1) Respiratory failure  Check sputum for routine fungal , afb culture    Low threshold to resume abx with vanco/ meropenem pending cx data    2) C auris colonization    Contact isolation    3) Leukocytosis  Multifactorial  Continue to monitor    Contact precautions

## 2019-07-02 NOTE — PROGRESS NOTE ADULT - SUBJECTIVE AND OBJECTIVE BOX
GINNAOSCAR  MRN-[id number]    Patient is a 48y old  Male who presents with a chief complaint of Bleeding from tracheal stoma (01 Jul 2019 13:01)    HPI:  48 year old man with sarcoidosis, pulmonary fibrosis, aspergilloma s/p DAVID lobectomy secondary to hemoptysis, respiratory failure s/p tracheotomy now decannulated was transferred from [facility] for bleeding at trach stoma. Per history the patient had trach inserted as a part of a complicated post op course following lobectomy and was decannulated approximately 2 years ago. The patient was admitted to OSH with concerns for sepsis and was started on Vanco/Cefepime. He remained hemodynamically stable throughout stay with exception of some tachycardia. He was seen by general surgery and the tracheal stoma was cauterized using silver nitrate. The patient was transferred to [facility] for surgical evaluation of bleeding. He also had a swab positive for candida auris prior to transfer (site unknown). He complains of dyspnea and hoarseness. He is NPO for bronchoscopy.      PAST MEDICAL & SURGICAL HISTORY:  History of pneumothorax: History of 3 prior pneumonthoracies.  COPD (chronic obstructive pulmonary disease)  Asthma  Hypertension  Sarcoidosis  History of thoracotomy: 9/24/17 Left thoracotomy, Left upper lobectomy   9/25/17 Reop left thoracotomy, evacuation of left hemothorax    FAMILY HISTORY:  Family history of pancreatic cancer  Family history of essential hypertension    Social History:  Denies smoking, illicit drug use or frequent alcohol consumption    Allergies  No Known Allergies    MEDICATIONS  (STANDING):  chlorhexidine 4% Liquid 1 Application(s) Topical <User Schedule>  docusate sodium 100 milliGRAM(s) Oral two times a day  ipratropium    for Nebulization 500 MICROGram(s) Nebulizer every 6 hours  metoprolol tartrate 12.5 milliGRAM(s) Oral every 12 hours  pantoprazole    Tablet 40 milliGRAM(s) Oral before breakfast  predniSONE   Tablet 5 milliGRAM(s) Oral daily  senna 2 Tablet(s) Oral at bedtime    MEDICATIONS  (PRN):  acetaminophen   Tablet .. 650 milliGRAM(s) Oral every 6 hours PRN Temp greater or equal to 38C (100.4F), Mild Pain (1 - 3)  bisacodyl Suppository 10 milliGRAM(s) Rectal daily PRN Constipation  oxyCODONE    IR 5 milliGRAM(s) Oral every 6 hours PRN Moderate Pain (4 - 6)  oxyCODONE    IR 10 milliGRAM(s) Oral every 6 hours PRN Severe Pain (7 - 10)  polyethylene glycol 3350 17 Gram(s) Oral daily PRN Constipation    Review of Systems:  Constitutional:  Negative for weight change, fever, malaise  HEENT:  Negative for sinus pain, hoarseness, sore throat, dysphagia, vision changes  Cardiovascular:  Negative for chest pain, palpitations, dizziness  Respiratory:  Negative for cough, wheezing, dyspnea  Gastrointestinal:  Negative for nausea, vomiting, diarrhea, melena  Musculoskeletal:  Negative for pain, swelling, stiffness   Neuro:  Negative for weakness, numbness, headache  Psych:  Negative for anxiety, depression  Endocrine:  Negative for polyuria, polydipsia, temperature Intolerance    All other systems negative unless otherwise stated    ICU Vital Signs Last 24 Hrs  T(C): 36.4 (02 Jul 2019 04:00), Max: 36.5 (01 Jul 2019 20:00)  T(F): 97.6 (02 Jul 2019 04:00), Max: 97.7 (01 Jul 2019 20:00)  HR: 110 (02 Jul 2019 04:54) (84 - 122)  BP: 99/76 (02 Jul 2019 04:00) (99/76 - 137/93)  BP(mean): 80 (02 Jul 2019 04:00) (80 - 105)  ABP: --  ABP(mean): --  RR: 36 (02 Jul 2019 04:00) (26 - 36)  SpO2: 98% (02 Jul 2019 04:54) (92% - 99%)    Daily     Daily   I&O's Summary    30 Jun 2019 07:01  -  01 Jul 2019 07:00  --------------------------------------------------------  IN: 300 mL / OUT: 975 mL / NET: -675 mL    01 Jul 2019 07:01  -  02 Jul 2019 06:52  --------------------------------------------------------  IN: 0 mL / OUT: 600 mL / NET: -600 mL    Physical Exam:  Gen: Alert, no apparent distress  CV: Regular rate and rhythm no murmurs, rubs or gallops  Pulm: Intermittent wheezes and rhonchi diffusely  GI: Abd is soft, non-tender and non-distended with +BS  Ext: No clubbing, cyanosis' 1+ bilateral lower extremity pitting edema; L ankle is larger than R ankle  Neuro: A+Ox3, follows commands and moves all extremities      Labs:                          10.0   14.69 )-----------( 182      ( 01 Jul 2019 05:00 )             34.6       07-01    140  |  90<L>  |  26<H>  ----------------------------<  131<H>  4.9   |  42<H>  |  0.92    Ca    10.4      01 Jul 2019 05:00  Phos  2.7     07-01  Mg     2.1     07-01    Assessment/Plan: 48 year old man with sarcoidosis, pulmonary fibrosis, aspergilloma s/p DAVID lobectomy secondary to hemoptysis, respiratory failure s/p tracheotomy now decannulated was transferred from [facility] for bleeding at trach stoma.    Neuro:   Pain control with Tylenol   Xanax prn anxiety                                          Cardiovascular:    Stable hemodynamics  Not on any pressors  Continue hemodynamic monitoring    Respiratory:  Patient is comfortable on nasal cannula  Minimal bleeding from trach site since cauterization - will get bronchoscopy and ? stoma closure today  Stopped Solumedrol - back on outpatient Prednisone dose  Continue nebulizers  Pulmonary is following - appreciate recs    GI:  Tolerating soft diet; NPO this AM for bronchoscopy  Continue bowel regimen  Continue Zofran for nausea - PRN	          Renal:  Creatinine was elevated but downtrending, now normal  Monitor I/Os and electrolytes    Hematologic / Oncology:  No signs of active bleeding, H/H stable   SCDs for DVT ppl    Infectious disease:  ID following  S/p Vancomycin and Cefepime for pneumonia; stopped on 6/30  Colonized with candida auris - on contact precautions, infection control aware  Re-cultured per ID, cultures thus far negative    Endocrine:  Continue Accuchecks with coverage    All clinical, lab, hemodynamic and radiographic data were reviewed and the plan was discussed with CTICU team.     Tio Roth MD

## 2019-07-02 NOTE — PROGRESS NOTE ADULT - SUBJECTIVE AND OBJECTIVE BOX
ARTERIAL LINE INSERTION NOTE     The patient was prepped and draped in the usual sterile fashion. The R radial/femoral artery was accessed.  Pulsatile, arterial blood was visualized. Using modified Seldinger technique, a guidewire was threaded into the artery and an arterial line catheter was placed. Again the catheter demonstrated good arterial flow after removal of the guidewire. Good wave-form was obtained on the monitor.  The catheter was then sutured into place. The area was cleaned and a sterile dressing was placed. The patient tolerated the procedure well without any immediate complications.

## 2019-07-02 NOTE — PROGRESS NOTE ADULT - SUBJECTIVE AND OBJECTIVE BOX
Follow Up:      Inverval History/ROS:Patient is a 48y old  Male who presents with a chief complaint of Bleeding from tracheal stoma (02 Jul 2019 06:52)    Respiratory distress this am,  Required re-intubation    Currently on pressors    Allergies    No Known Allergies    Intolerances        ANTIMICROBIALS:      OTHER MEDS:  acetaminophen   Tablet .. 650 milliGRAM(s) Oral every 6 hours PRN  bisacodyl Suppository 10 milliGRAM(s) Rectal daily PRN  chlorhexidine 4% Liquid 1 Application(s) Topical <User Schedule>  dexmedetomidine Infusion 0.7 MICROgram(s)/kG/Hr IV Continuous <Continuous>  docusate sodium 100 milliGRAM(s) Oral two times a day  furosemide    Tablet 20 milliGRAM(s) Oral every 12 hours  furosemide   Injectable 20 milliGRAM(s) IV Push once  ipratropium    for Nebulization 500 MICROGram(s) Nebulizer every 6 hours  metoprolol tartrate 12.5 milliGRAM(s) Oral every 12 hours  oxyCODONE    IR 5 milliGRAM(s) Oral every 6 hours PRN  oxyCODONE    IR 10 milliGRAM(s) Oral every 6 hours PRN  pantoprazole    Tablet 40 milliGRAM(s) Oral before breakfast  phenylephrine    Infusion 0.5 MICROgram(s)/kG/Min IV Continuous <Continuous>  polyethylene glycol 3350 17 Gram(s) Oral daily PRN  predniSONE   Tablet 5 milliGRAM(s) Oral daily  propofol Infusion 40 MICROgram(s)/kG/Min IV Continuous <Continuous>  senna 2 Tablet(s) Oral at bedtime      Vital Signs Last 24 Hrs  T(C): 36.6 (02 Jul 2019 08:00), Max: 36.6 (02 Jul 2019 08:00)  T(F): 97.8 (02 Jul 2019 08:00), Max: 97.8 (02 Jul 2019 08:00)  HR: 100 (02 Jul 2019 10:46) (100 - 122)  BP: 110/91 (02 Jul 2019 08:00) (99/76 - 137/93)  BP(mean): 95 (02 Jul 2019 08:00) (80 - 103)  RR: 27 (02 Jul 2019 08:00) (26 - 36)  SpO2: 100% (02 Jul 2019 10:46) (92% - 100%)    PHYSICAL EXAM:  General: [x ] intubated  HEAD/EYES: [ ] PERRL [ x] white sclera [ ] icterus  ENT:  [ ] normal x[ ] supple [ ] thrush [ ] pharyngeal exudate  Cardiovascular:   [ ] murmur [x ] normal [ ] PPM/AICD  Respiratory:  [ x] clear to ausculation bilaterally  GI:  [ x] soft, non-tender, normal bowel sounds  :  [x ] mock [ ] no CVA tenderness   Musculoskeletal:  [x ] no synovitis  Neurologic:  [ ] non-focal exam   Skin:  [ ] no rash  Lymph: [ ] no lymphadenopathy  Psychiatric:  [ ] appropriate affect [ ] alert & oriented  Lines:  [x ] no phlebitis [ ] central line                                10.9   13.75 )-----------( 175      ( 02 Jul 2019 07:40 )             38.7       07-02    142  |  90<L>  |  26<H>  ----------------------------<  87  4.7   |  43<H>  |  0.91    Ca    10.4      02 Jul 2019 07:40  Phos  3.0     07-02  Mg     2.2     07-02            MICROBIOLOGY:    RADIOLOGY:

## 2019-07-03 LAB
ALBUMIN SERPL ELPH-MCNC: 3.5 G/DL — SIGNIFICANT CHANGE UP (ref 3.3–5)
ALP SERPL-CCNC: 54 U/L — SIGNIFICANT CHANGE UP (ref 40–120)
ALT FLD-CCNC: 20 U/L — SIGNIFICANT CHANGE UP (ref 4–41)
ANION GAP SERPL CALC-SCNC: 7 MMO/L — SIGNIFICANT CHANGE UP (ref 7–14)
AST SERPL-CCNC: 13 U/L — SIGNIFICANT CHANGE UP (ref 4–40)
BASE EXCESS BLDA CALC-SCNC: 19.8 MMOL/L — SIGNIFICANT CHANGE UP
BASE EXCESS BLDA CALC-SCNC: 20.3 MMOL/L — SIGNIFICANT CHANGE UP
BASE EXCESS BLDA CALC-SCNC: 20.6 MMOL/L — SIGNIFICANT CHANGE UP
BASOPHILS # BLD AUTO: 0.02 K/UL — SIGNIFICANT CHANGE UP (ref 0–0.2)
BASOPHILS NFR BLD AUTO: 0.1 % — SIGNIFICANT CHANGE UP (ref 0–2)
BASOPHILS NFR SPEC: 0 % — SIGNIFICANT CHANGE UP (ref 0–2)
BILIRUB SERPL-MCNC: 0.5 MG/DL — SIGNIFICANT CHANGE UP (ref 0.2–1.2)
BLASTS # FLD: 0 % — SIGNIFICANT CHANGE UP (ref 0–0)
BLOOD GAS ARTERIAL - FIO2: 40 — SIGNIFICANT CHANGE UP
BUN SERPL-MCNC: 27 MG/DL — HIGH (ref 7–23)
CALCIUM SERPL-MCNC: 9.7 MG/DL — SIGNIFICANT CHANGE UP (ref 8.4–10.5)
CHLORIDE BLDA-SCNC: 97 MMOL/L — SIGNIFICANT CHANGE UP (ref 96–108)
CHLORIDE SERPL-SCNC: 91 MMOL/L — LOW (ref 98–107)
CO2 SERPL-SCNC: 42 MMOL/L — HIGH (ref 22–31)
CREAT SERPL-MCNC: 0.8 MG/DL — SIGNIFICANT CHANGE UP (ref 0.5–1.3)
EOSINOPHIL # BLD AUTO: 0.01 K/UL — SIGNIFICANT CHANGE UP (ref 0–0.5)
EOSINOPHIL NFR BLD AUTO: 0.1 % — SIGNIFICANT CHANGE UP (ref 0–6)
EOSINOPHIL NFR FLD: 0 % — SIGNIFICANT CHANGE UP (ref 0–6)
GIANT PLATELETS BLD QL SMEAR: PRESENT — SIGNIFICANT CHANGE UP
GLUCOSE BLDA-MCNC: 128 MG/DL — HIGH (ref 70–99)
GLUCOSE BLDA-MCNC: 148 MG/DL — HIGH (ref 70–99)
GLUCOSE SERPL-MCNC: 140 MG/DL — HIGH (ref 70–99)
HCO3 BLDA-SCNC: 43 MMOL/L — HIGH (ref 22–26)
HCO3 BLDA-SCNC: 44 MMOL/L — HIGH (ref 22–26)
HCO3 BLDA-SCNC: 44 MMOL/L — HIGH (ref 22–26)
HCT VFR BLD CALC: 33.8 % — LOW (ref 39–50)
HCT VFR BLDA CALC: 31.8 % — LOW (ref 39–51)
HCT VFR BLDA CALC: 32.2 % — LOW (ref 39–51)
HGB BLD-MCNC: 10.1 G/DL — LOW (ref 13–17)
HGB BLDA-MCNC: 10.3 G/DL — LOW (ref 13–17)
HGB BLDA-MCNC: 10.4 G/DL — LOW (ref 13–17)
IMM GRANULOCYTES NFR BLD AUTO: 1.1 % — SIGNIFICANT CHANGE UP (ref 0–1.5)
LACTATE BLDA-SCNC: 1.4 MMOL/L — SIGNIFICANT CHANGE UP (ref 0.5–2)
LACTATE SERPL-SCNC: 1.1 MMOL/L — SIGNIFICANT CHANGE UP (ref 0.5–2)
LYMPHOCYTES # BLD AUTO: 1.74 K/UL — SIGNIFICANT CHANGE UP (ref 1–3.3)
LYMPHOCYTES # BLD AUTO: 9 % — LOW (ref 13–44)
LYMPHOCYTES NFR SPEC AUTO: 3.6 % — LOW (ref 13–44)
MCHC RBC-ENTMCNC: 27.4 PG — SIGNIFICANT CHANGE UP (ref 27–34)
MCHC RBC-ENTMCNC: 29.9 % — LOW (ref 32–36)
MCV RBC AUTO: 91.6 FL — SIGNIFICANT CHANGE UP (ref 80–100)
METAMYELOCYTES # FLD: 0 % — SIGNIFICANT CHANGE UP (ref 0–1)
MONOCYTES # BLD AUTO: 1.3 K/UL — HIGH (ref 0–0.9)
MONOCYTES NFR BLD AUTO: 6.7 % — SIGNIFICANT CHANGE UP (ref 2–14)
MONOCYTES NFR BLD: 3.6 % — SIGNIFICANT CHANGE UP (ref 2–9)
MYELOCYTES NFR BLD: 0 % — SIGNIFICANT CHANGE UP (ref 0–0)
NEUTROPHIL AB SER-ACNC: 92.8 % — HIGH (ref 43–77)
NEUTROPHILS # BLD AUTO: 16 K/UL — HIGH (ref 1.8–7.4)
NEUTROPHILS NFR BLD AUTO: 83 % — HIGH (ref 43–77)
NEUTS BAND # BLD: 0 % — SIGNIFICANT CHANGE UP (ref 0–6)
NRBC # FLD: 0 K/UL — SIGNIFICANT CHANGE UP (ref 0–0)
OTHER - HEMATOLOGY %: 0 — SIGNIFICANT CHANGE UP
PCO2 BLDA: 51 MMHG — HIGH (ref 35–48)
PCO2 BLDA: 53 MMHG — HIGH (ref 35–48)
PCO2 BLDA: 59 MMHG — HIGH (ref 35–48)
PH BLDA: 7.5 PH — HIGH (ref 7.35–7.45)
PH BLDA: 7.54 PH — HIGH (ref 7.35–7.45)
PH BLDA: 7.55 PH — HIGH (ref 7.35–7.45)
PLATELET # BLD AUTO: 159 K/UL — SIGNIFICANT CHANGE UP (ref 150–400)
PLATELET COUNT - ESTIMATE: SIGNIFICANT CHANGE UP
PMV BLD: 13.5 FL — HIGH (ref 7–13)
PO2 BLDA: 128 MMHG — HIGH (ref 83–108)
PO2 BLDA: 137 MMHG — HIGH (ref 83–108)
PO2 BLDA: 164 MMHG — HIGH (ref 83–108)
POLYCHROMASIA BLD QL SMEAR: SLIGHT — SIGNIFICANT CHANGE UP
POTASSIUM BLDA-SCNC: 3.5 MMOL/L — SIGNIFICANT CHANGE UP (ref 3.4–4.5)
POTASSIUM BLDA-SCNC: 3.6 MMOL/L — SIGNIFICANT CHANGE UP (ref 3.4–4.5)
POTASSIUM SERPL-MCNC: 4 MMOL/L — SIGNIFICANT CHANGE UP (ref 3.5–5.3)
POTASSIUM SERPL-SCNC: 4 MMOL/L — SIGNIFICANT CHANGE UP (ref 3.5–5.3)
PROMYELOCYTES # FLD: 0 % — SIGNIFICANT CHANGE UP (ref 0–0)
PROT SERPL-MCNC: 6.4 G/DL — SIGNIFICANT CHANGE UP (ref 6–8.3)
RBC # BLD: 3.69 M/UL — LOW (ref 4.2–5.8)
RBC # FLD: 14.3 % — SIGNIFICANT CHANGE UP (ref 10.3–14.5)
SAO2 % BLDA: 100 % — HIGH (ref 95–99)
SAO2 % BLDA: 99.5 % — HIGH (ref 95–99)
SAO2 % BLDA: 99.7 % — HIGH (ref 95–99)
SODIUM BLDA-SCNC: 136 MMOL/L — SIGNIFICANT CHANGE UP (ref 136–146)
SODIUM BLDA-SCNC: 137 MMOL/L — SIGNIFICANT CHANGE UP (ref 136–146)
SODIUM SERPL-SCNC: 140 MMOL/L — SIGNIFICANT CHANGE UP (ref 135–145)
SPECIMEN SOURCE: SIGNIFICANT CHANGE UP
VANCOMYCIN TROUGH SERPL-MCNC: 7.4 UG/ML — LOW (ref 10–20)
VARIANT LYMPHS # BLD: 0 % — SIGNIFICANT CHANGE UP
WBC # BLD: 19.29 K/UL — HIGH (ref 3.8–10.5)
WBC # FLD AUTO: 19.29 K/UL — HIGH (ref 3.8–10.5)

## 2019-07-03 PROCEDURE — 99292 CRITICAL CARE ADDL 30 MIN: CPT

## 2019-07-03 PROCEDURE — 71045 X-RAY EXAM CHEST 1 VIEW: CPT | Mod: 26,77

## 2019-07-03 PROCEDURE — 71045 X-RAY EXAM CHEST 1 VIEW: CPT | Mod: 26

## 2019-07-03 PROCEDURE — 99233 SBSQ HOSP IP/OBS HIGH 50: CPT

## 2019-07-03 PROCEDURE — 99291 CRITICAL CARE FIRST HOUR: CPT

## 2019-07-03 RX ORDER — FUROSEMIDE 40 MG
20 TABLET ORAL ONCE
Refills: 0 | Status: COMPLETED | OUTPATIENT
Start: 2019-07-03 | End: 2019-07-03

## 2019-07-03 RX ORDER — ACETAMINOPHEN 500 MG
1000 TABLET ORAL ONCE
Refills: 0 | Status: COMPLETED | OUTPATIENT
Start: 2019-07-03 | End: 2019-07-03

## 2019-07-03 RX ORDER — POTASSIUM CHLORIDE 20 MEQ
40 PACKET (EA) ORAL ONCE
Refills: 0 | Status: COMPLETED | OUTPATIENT
Start: 2019-07-03 | End: 2019-07-03

## 2019-07-03 RX ADMIN — DEXMEDETOMIDINE HYDROCHLORIDE IN 0.9% SODIUM CHLORIDE 14.6 MICROGRAM(S)/KG/HR: 4 INJECTION INTRAVENOUS at 21:33

## 2019-07-03 RX ADMIN — SODIUM CHLORIDE 50 MILLILITER(S): 9 INJECTION, SOLUTION INTRAVENOUS at 21:32

## 2019-07-03 RX ADMIN — SODIUM CHLORIDE 50 MILLILITER(S): 9 INJECTION, SOLUTION INTRAVENOUS at 08:17

## 2019-07-03 RX ADMIN — Medication 40 MILLIEQUIVALENT(S): at 21:46

## 2019-07-03 RX ADMIN — MEROPENEM 100 MILLIGRAM(S): 1 INJECTION INTRAVENOUS at 05:02

## 2019-07-03 RX ADMIN — Medication 40 MILLIGRAM(S): at 21:33

## 2019-07-03 RX ADMIN — PROPOFOL 20.02 MICROGRAM(S)/KG/MIN: 10 INJECTION, EMULSION INTRAVENOUS at 08:17

## 2019-07-03 RX ADMIN — PROPOFOL 20.02 MICROGRAM(S)/KG/MIN: 10 INJECTION, EMULSION INTRAVENOUS at 21:32

## 2019-07-03 RX ADMIN — MEROPENEM 100 MILLIGRAM(S): 1 INJECTION INTRAVENOUS at 21:33

## 2019-07-03 RX ADMIN — Medication 40 MILLIGRAM(S): at 14:00

## 2019-07-03 RX ADMIN — HEPARIN SODIUM 5000 UNIT(S): 5000 INJECTION INTRAVENOUS; SUBCUTANEOUS at 17:03

## 2019-07-03 RX ADMIN — PHENYLEPHRINE HYDROCHLORIDE 15.64 MICROGRAM(S)/KG/MIN: 10 INJECTION INTRAVENOUS at 21:33

## 2019-07-03 RX ADMIN — DEXMEDETOMIDINE HYDROCHLORIDE IN 0.9% SODIUM CHLORIDE 14.6 MICROGRAM(S)/KG/HR: 4 INJECTION INTRAVENOUS at 08:18

## 2019-07-03 RX ADMIN — Medication 20 MILLIGRAM(S): at 21:33

## 2019-07-03 RX ADMIN — PHENYLEPHRINE HYDROCHLORIDE 15.64 MICROGRAM(S)/KG/MIN: 10 INJECTION INTRAVENOUS at 08:17

## 2019-07-03 RX ADMIN — CHLORHEXIDINE GLUCONATE 1 APPLICATION(S): 213 SOLUTION TOPICAL at 05:03

## 2019-07-03 RX ADMIN — PANTOPRAZOLE SODIUM 40 MILLIGRAM(S): 20 TABLET, DELAYED RELEASE ORAL at 12:10

## 2019-07-03 RX ADMIN — Medication 1000 MILLIGRAM(S): at 12:30

## 2019-07-03 RX ADMIN — Medication 20 MILLIGRAM(S): at 08:18

## 2019-07-03 RX ADMIN — Medication 250 MILLIGRAM(S): at 05:02

## 2019-07-03 RX ADMIN — CHLORHEXIDINE GLUCONATE 15 MILLILITER(S): 213 SOLUTION TOPICAL at 05:03

## 2019-07-03 RX ADMIN — HEPARIN SODIUM 5000 UNIT(S): 5000 INJECTION INTRAVENOUS; SUBCUTANEOUS at 05:02

## 2019-07-03 RX ADMIN — MEROPENEM 100 MILLIGRAM(S): 1 INJECTION INTRAVENOUS at 14:00

## 2019-07-03 RX ADMIN — Medication 400 MILLIGRAM(S): at 12:10

## 2019-07-03 RX ADMIN — CHLORHEXIDINE GLUCONATE 15 MILLILITER(S): 213 SOLUTION TOPICAL at 17:03

## 2019-07-03 RX ADMIN — Medication 40 MILLIGRAM(S): at 06:32

## 2019-07-03 RX ADMIN — Medication 250 MILLIGRAM(S): at 17:03

## 2019-07-03 NOTE — PROGRESS NOTE ADULT - SUBJECTIVE AND OBJECTIVE BOX
CHACKOOSCAR                     MRN-[id number]    HPI:  48 year old man with sarcoidosis, pulmonary fibrosis, hx/o aspergilloma s/p DAVID lobectomy secondary to hemoptysis transferred from [facility] for bleeding at trach stoma. Pt had trach inserted as a part of a complicated post op course following Lobectomy. Pt admitted to OSH with concerns for sepsis, was started on Vanco/Cefepime. Pt was remained hemodynimcally stable throughout stay with exception of some tachycardia. Pt was seen by general surgery and the tracheal stoma was cauterized using silver nitrate. Pt was transferred to [facility] for surgical evaluation of bleeding.  Of note- when patient was received at [facility], EMS stated pt was on isolation for a positive Candida auris test. Called [facility] and spoke to the nurse previosuly taking care of patient- patient did indeed have a positive Candida auris swab from ether the groin, axilla or king.  Attempted to reach attending regarding more details, awaiting callback. (29 Jun 2019 12:19)      Procedure:   S/P    bronchoscopy in ICU  via ETT; 7/2/2019        Issues:                Hypotension on pressors               Acute respiratory failure, Hypercapnia, intubated               Sepsis  Hemoptysis / Bleeding from tracheostomy site              Sarcoidosis  Asthma / COPD              Tracheostomy              Anemia  Hx of CVA  with L-side weakness  Candida Auris colonization  HTN              Candida Auris             PAST MEDICAL & SURGICAL HISTORY:  History of pneumothorax: History of 3 prior pneumonthoracies.  COPD (chronic obstructive pulmonary disease)  Asthma  Hypertension  Sarcoidosis  History of thoracotomy: 9/24/17 Left thoracotomy, Left upper lobectomy   9/25/17 Reop left thoracotomy, evacuation of left hemothorax            VITAL SIGNS:  Vital Signs Last 24 Hrs  T(C): 37.3 (03 Jul 2019 04:00), Max: 38.6 (02 Jul 2019 20:00)  T(F): 99.1 (03 Jul 2019 04:00), Max: 101.4 (02 Jul 2019 20:00)  HR: 74 (03 Jul 2019 06:00) (59 - 120)  BP: 127/96 (03 Jul 2019 04:00) (83/60 - 135/106)  BP(mean): 103 (03 Jul 2019 04:00) (66 - 113)  RR: 12 (03 Jul 2019 06:00) (12 - 38)  SpO2: 100% (03 Jul 2019 06:00) (94% - 100%)    I/Os:   I&O's Detail    01 Jul 2019 07:01  -  02 Jul 2019 07:00  --------------------------------------------------------  IN:  Total IN: 0 mL    OUT:    Voided: 900 mL  Total OUT: 900 mL    Total NET: -900 mL      02 Jul 2019 07:01  -  03 Jul 2019 06:39  --------------------------------------------------------  IN:    dexmedetomidine Infusion: 335.5 mL    IV PiggyBack: 850 mL    lactated ringers.: 500 mL    phenylephrine   Infusion: 378.1 mL    propofol Infusion: 310.2 mL  Total IN: 2373.8 mL    OUT:    Indwelling Catheter - Urethral: 1925 mL    Voided: 250 mL  Total OUT: 2175 mL    Total NET: 198.8 mL          CAPILLARY BLOOD GLUCOSE          =======================MEDICATIONS===================  MEDICATIONS  (STANDING):  chlorhexidine 4% Liquid 1 Application(s) Topical <User Schedule>  dexmedetomidine Infusion 0.7 MICROgram(s)/kG/Hr (14.595 mL/Hr) IV Continuous <Continuous>  heparin  Injectable 5000 Unit(s) SubCutaneous every 12 hours  lactated ringers. 1000 milliLiter(s) (50 mL/Hr) IV Continuous <Continuous>  meropenem  IVPB 1000 milliGRAM(s) IV Intermittent every 8 hours  methylPREDNISolone sodium succinate Injectable 40 milliGRAM(s) IV Push every 8 hours  pantoprazole  Injectable 40 milliGRAM(s) IV Push daily  phenylephrine    Infusion 0.5 MICROgram(s)/kG/Min (15.637 mL/Hr) IV Continuous <Continuous>  propofol Infusion 40 MICROgram(s)/kG/Min (20.016 mL/Hr) IV Continuous <Continuous>  vancomycin  IVPB 1000 milliGRAM(s) IV Intermittent every 12 hours    MEDICATIONS  (PRN):  bisacodyl Suppository 10 milliGRAM(s) Rectal daily PRN Constipation      =======================VENTILATOR SETTINGS===================  Mode: AC/ CMV (Assist Control/ Continuous Mandatory Ventilation)  RR (machine): 12  TV (machine): 0.5  FiO2: 40  PEEP: 5  MAP: 15  PIP: 43      PHYSICAL EXAM============================  General:                         Awake, alert, not in any distress  Neuro:                            Moving all extremities to commands.   Respiratory:	Air entry fair and  bilateral conducted sounds                                           Effort even and unlabored.  CV:		Regular rate and rhythm. Normal S1/S2                                          Distal pulses present.  Abdomen:	                     Soft, non-distended. Bowel sounds present   Skin:		No rash.  Extremities:	Warm, no cyanosis or edema.  Palpable pulses    ============================LABS=========================                        10.1   19.29 )-----------( 159      ( 03 Jul 2019 03:10 )             33.8     07-03    140  |  91<L>  |  27<H>  ----------------------------<  140<H>  4.0   |  42<H>  |  0.80    Ca    9.7      03 Jul 2019 03:10  Phos  3.0     07-02  Mg     2.2     07-02    TPro  6.4  /  Alb  3.5  /  TBili  0.5  /  DBili  x   /  AST  13  /  ALT  20  /  AlkPhos  54  07-03    LIVER FUNCTIONS - ( 03 Jul 2019 03:10 )  Alb: 3.5 g/dL / Pro: 6.4 g/dL / ALK PHOS: 54 u/L / ALT: 20 u/L / AST: 13 u/L / GGT: x           PT/INR - ( 02 Jul 2019 07:40 )   PT: 11.9 SEC;   INR: 1.04          PTT - ( 02 Jul 2019 07:40 )  PTT:29.1 SEC  ABG - ( 03 Jul 2019 03:10 )  pH, Arterial: 7.55  pH, Blood: x     /  pCO2: 51    /  pO2: 164   / HCO3: 43    / Base Excess: 19.8  /  SaO2: 99.7                  ============================IMAGING STUDIES=========================  < from: Xray Chest 1 View- PORTABLE-Urgent (07.02.19 @ 11:17) >    Since the last exam, an endotracheal tube has been placed and is in   satisfactory position. No complications are seen from this maneuver.      COMPARISON:  July 1 at 6:34 AM      IMPRESSION:  Status post intubation.    A/P:  48 year old man with sarcoidosis, pulmonary fibrosis, hx/o aspergilloma s/p DAVID lobectomy secondary to hemoptysis transferred from [facility] for bleeding at [facility] stoma. Pt had trach inserted as a part of a complicated post op course following Lobectomy. Pt admitted to OSH with concerns for sepsis, was started on Vanco/Cefepime. Pt was remained hemodynimcally stable throughout stay with exception of some tachycardia. Pt was seen by general surgery and the tracheal stoma was cauterized using silver nitrate. Pt was transferred to [facility] for surgical evaluation of bleeding.                            Neuro:                                         Pain control with  Tylenol IV                                       Sedated on Propofol and Precedex , Nonfocal off sedation                          Cardiovascular:                                          Continue hemodynamic monitoring.    Hx of PFO / A-fib - Was on anticoagulation but stopped because of hemoptysis    A-fib - was on Lopressor 12.5mg BID, Hold while on pressors and hypotensive                                         Hypotension: Eldon drip keep MAP>65                          Respiratory:                                         Pt became acutely restless/AMS, ABG with hypercapnia, required urgent Intubation and vent support, A line placed for ABG/BP monitoring                                          CPAP weaning as so.                                          Pt has blood tinged secretions coming out from tracheostomy site. Will d/w CTSx for bronch                                                                FB at bedside via ET.      Sarcoidosis -  Was on prednisone at home, switched to solumedrol     ? Pneumonia - ID consulted, restarted on IV antibiotics Vanco + Kat                            GI                                         NPO                                         Continue GI prophylaxis with Protonix                                         Continue Zofran / Reglan for nausea - PRN	                                                                 Renal:                                         Continue IVF                                         Monitor I/Os and electrolytes                                                                                        Hem/ Onc:                                                                                   Follow CBC in AM                           Infectious disease:      ? Pneumonia - Completed 7 days of antibiotics Vanco + Cefepime. D/W I.D      Reported to be positive for Candida auris from surveillance swabs - Re-cultured per ID,will remain in  isolation during hospitalization                                          Monitor for fever / leukocytosis.                                                                    Endocrine                                             Continue Accu-Checks with coverage    Pt is on SQ Heparin and Venodyne boots for DVT prophylaxis.     Pertinent clinical, laboratory, radiographic, hemodynamic, echocardiographic, respiratory data, microbiologic data and chart were reviewed and analyzed frequently throughout the course of the day and night  Patient seen, examined and plan discussed with CT Surgeon Dr. Caldera / CTICU team during rounds.    Pt's status discussed with family at bedside, updated status  I spent 35 minutes at bedside providing critical care from 7am to 11pm    Flor Pepper DO, FACEP

## 2019-07-03 NOTE — PROGRESS NOTE ADULT - SUBJECTIVE AND OBJECTIVE BOX
OSCAR CHACKO   MRN#: [id number]     The patient is a 48y Male who was seen, evaluated, & examined with the CTICU staff with a multidisciplinary care plan formulated & implemented.  All available clinical, laboratory, radiographic, pharmacologic, and electrocardiographic data were reviewed & analyzed.      The patient was in the CTICU in critical condition secondary to:     persistent cardiopulmonary dysfunction - acute hypercarbic respiratory failure  vasodilatory shock    For support and evaluation & prevention of further decompensation secondary to persistent cardiopulmonary dysfunction - acute hypercarbic respiratory failure, respiratory status required:     supplemental oxygen with full ventilatory support / mechanical ventilation  continuous pulse oximetry monitoring  following ABGs with A-line monitoring  IV Dexmedetomidine infusion  IV Propofol infusion    Invasive hemodynamic monitoring with an A-line was required for continuous MAP/BP monitoring to ensure adequate cardiovascular support and to evaluate for & help prevent decompensation while receiving IV Phenylephrine infusion secondary to vasodilatory shock.    In addition:  Baseline sarcoidosis, pulmonary fibrosis, chronic CO2 retainer, prior respiratory failure with trach now decannulated; transferred from [facility] due to bleeding from stoma  Decompensated yesterday with pCO2 110 requiring intubation - also hypotensive post sedation with Propofol/Precedex requiring pressors   Weaning off Propofol, continue with Precedex  Pressor requirement also decreasing; biventricular dysfunction on most recent echo - would ideally wean Phenylephrine off as increasing SVR with a low LVEF is not ideal  Volume up on exam - diuresing with Lasix with goal even to negative 500 cc  Bronchoscopy done yesterday with thick, pus-like secretions  PCO2 in 50s; attempted CPAP trial today and patient failed 15/5 after 5 minutes due to tachycardia, tachypnea and hypoxia - likely needs more time to recover from infection and may need trach for prolonged wean  Feeds started - will await nutrition consult for tube feed goals   On broad spectrum coverage with Vancomycin and Meropenem - preliminary cultures from BAL show ? strep and GNR (prior pseudomonas infection) - ID following  Skin swab positive for candida auris - ? if colonized - repeat swab pending, other AFB/fungal cultures have been negative - Infection Control is following  If he remains positive for candida auris then he is likely not a viable lung transplant candidate - need to confirm with [facility] where he was being evaluated - Pulmonary is aware  If he is not a transplant candidate, even if he were to overcome his acute pathology, he has severe baseline lung disease without hope of improvement and he gets admitted frequently - would consult Palliative Care    The patient required critical care management and I personally provided 80 minutes of non-continuous care to the patient, excluding separate procedures, in addition to  discussing the patient and plan at length with the CTICU staff and helping coordinate care.

## 2019-07-03 NOTE — PROGRESS NOTE ADULT - ASSESSMENT
48 year old with history of sarcoidosis presents with reecent episode of hemoptysis from stoma    Tx for pneumonia at outside hospital    Colinzed with candida auris    Critically ill with new hypercapneic respiratory failure    1) Respiratory failure  Check sputum for routine fungal , afb culture    Continue with vanco/ meropenem pending cx data  If status worsens, add micafungin pending blood culture results    2) C auris colonization  Contact isolation    3) Leukocytosis  Multifactorial  Continue to monitor    Contact precautions

## 2019-07-04 LAB
ANION GAP SERPL CALC-SCNC: 8 MMO/L — SIGNIFICANT CHANGE UP (ref 7–14)
BASE EXCESS BLDA CALC-SCNC: 15.1 MMOL/L — SIGNIFICANT CHANGE UP
BUN SERPL-MCNC: 25 MG/DL — HIGH (ref 7–23)
CALCIUM SERPL-MCNC: 9.4 MG/DL — SIGNIFICANT CHANGE UP (ref 8.4–10.5)
CHLORIDE SERPL-SCNC: 92 MMOL/L — LOW (ref 98–107)
CO2 SERPL-SCNC: 40 MMOL/L — HIGH (ref 22–31)
CREAT SERPL-MCNC: 0.73 MG/DL — SIGNIFICANT CHANGE UP (ref 0.5–1.3)
GLUCOSE SERPL-MCNC: 167 MG/DL — HIGH (ref 70–99)
HCO3 BLDA-SCNC: 38 MMOL/L — HIGH (ref 22–26)
HCT VFR BLD CALC: 33.2 % — LOW (ref 39–50)
HGB BLD-MCNC: 10.1 G/DL — LOW (ref 13–17)
LACTATE SERPL-SCNC: 0.9 MMOL/L — SIGNIFICANT CHANGE UP (ref 0.5–2)
MAGNESIUM SERPL-MCNC: 2 MG/DL — SIGNIFICANT CHANGE UP (ref 1.6–2.6)
MCHC RBC-ENTMCNC: 26.9 PG — LOW (ref 27–34)
MCHC RBC-ENTMCNC: 30.4 % — LOW (ref 32–36)
MCV RBC AUTO: 88.5 FL — SIGNIFICANT CHANGE UP (ref 80–100)
NRBC # FLD: 0 K/UL — SIGNIFICANT CHANGE UP (ref 0–0)
PCO2 BLDA: 61 MMHG — HIGH (ref 35–48)
PH BLDA: 7.44 PH — SIGNIFICANT CHANGE UP (ref 7.35–7.45)
PHOSPHATE SERPL-MCNC: 3.1 MG/DL — SIGNIFICANT CHANGE UP (ref 2.5–4.5)
PLATELET # BLD AUTO: 141 K/UL — LOW (ref 150–400)
PMV BLD: 12.6 FL — SIGNIFICANT CHANGE UP (ref 7–13)
PO2 BLDA: 169 MMHG — HIGH (ref 83–108)
POTASSIUM SERPL-MCNC: 4.7 MMOL/L — SIGNIFICANT CHANGE UP (ref 3.5–5.3)
POTASSIUM SERPL-SCNC: 4.7 MMOL/L — SIGNIFICANT CHANGE UP (ref 3.5–5.3)
RBC # BLD: 3.75 M/UL — LOW (ref 4.2–5.8)
RBC # FLD: 14.6 % — HIGH (ref 10.3–14.5)
SAO2 % BLDA: 99.6 % — HIGH (ref 95–99)
SODIUM SERPL-SCNC: 140 MMOL/L — SIGNIFICANT CHANGE UP (ref 135–145)
WBC # BLD: 16.45 K/UL — HIGH (ref 3.8–10.5)
WBC # FLD AUTO: 16.45 K/UL — HIGH (ref 3.8–10.5)

## 2019-07-04 PROCEDURE — 36556 INSERT NON-TUNNEL CV CATH: CPT

## 2019-07-04 PROCEDURE — 71045 X-RAY EXAM CHEST 1 VIEW: CPT | Mod: 26,76

## 2019-07-04 PROCEDURE — 99291 CRITICAL CARE FIRST HOUR: CPT

## 2019-07-04 PROCEDURE — 99232 SBSQ HOSP IP/OBS MODERATE 35: CPT

## 2019-07-04 PROCEDURE — 76937 US GUIDE VASCULAR ACCESS: CPT | Mod: 26

## 2019-07-04 RX ORDER — SODIUM CHLORIDE 9 MG/ML
10 INJECTION INTRAMUSCULAR; INTRAVENOUS; SUBCUTANEOUS
Refills: 0 | Status: DISCONTINUED | OUTPATIENT
Start: 2019-07-04 | End: 2019-07-07

## 2019-07-04 RX ADMIN — PHENYLEPHRINE HYDROCHLORIDE 15.64 MICROGRAM(S)/KG/MIN: 10 INJECTION INTRAVENOUS at 19:22

## 2019-07-04 RX ADMIN — Medication 250 MILLIGRAM(S): at 05:36

## 2019-07-04 RX ADMIN — Medication 250 MILLIGRAM(S): at 22:45

## 2019-07-04 RX ADMIN — CHLORHEXIDINE GLUCONATE 15 MILLILITER(S): 213 SOLUTION TOPICAL at 18:44

## 2019-07-04 RX ADMIN — Medication 40 MILLIGRAM(S): at 22:26

## 2019-07-04 RX ADMIN — DEXMEDETOMIDINE HYDROCHLORIDE IN 0.9% SODIUM CHLORIDE 14.6 MICROGRAM(S)/KG/HR: 4 INJECTION INTRAVENOUS at 19:23

## 2019-07-04 RX ADMIN — MEROPENEM 100 MILLIGRAM(S): 1 INJECTION INTRAVENOUS at 05:34

## 2019-07-04 RX ADMIN — PROPOFOL 20.02 MICROGRAM(S)/KG/MIN: 10 INJECTION, EMULSION INTRAVENOUS at 19:22

## 2019-07-04 RX ADMIN — HEPARIN SODIUM 5000 UNIT(S): 5000 INJECTION INTRAVENOUS; SUBCUTANEOUS at 05:34

## 2019-07-04 RX ADMIN — Medication 40 MILLIGRAM(S): at 05:34

## 2019-07-04 RX ADMIN — CHLORHEXIDINE GLUCONATE 1 APPLICATION(S): 213 SOLUTION TOPICAL at 15:09

## 2019-07-04 RX ADMIN — MEROPENEM 100 MILLIGRAM(S): 1 INJECTION INTRAVENOUS at 15:10

## 2019-07-04 RX ADMIN — PANTOPRAZOLE SODIUM 40 MILLIGRAM(S): 20 TABLET, DELAYED RELEASE ORAL at 15:08

## 2019-07-04 RX ADMIN — HEPARIN SODIUM 5000 UNIT(S): 5000 INJECTION INTRAVENOUS; SUBCUTANEOUS at 19:20

## 2019-07-04 RX ADMIN — Medication 40 MILLIGRAM(S): at 15:08

## 2019-07-04 RX ADMIN — MEROPENEM 100 MILLIGRAM(S): 1 INJECTION INTRAVENOUS at 22:25

## 2019-07-04 RX ADMIN — CHLORHEXIDINE GLUCONATE 15 MILLILITER(S): 213 SOLUTION TOPICAL at 05:34

## 2019-07-04 NOTE — PROCEDURE NOTE - NSPROCDETAILS_GEN_ALL_CORE
patient pre-oxygenated, tube inserted, placement confirmed
sterile dressing applied/sterile technique, catheter placed/ultrasound guidance/lumen(s) aspirated and flushed/guidewire recovered
lumen(s) aspirated and flushed/guidewire recovered/sterile technique, catheter placed/sterile dressing applied/ultrasound guidance

## 2019-07-04 NOTE — PROCEDURE NOTE - NSINDICATIONS_GEN_A_CORE
respiratory distress
emergency venous access/critical illness
emergency venous access/critical illness

## 2019-07-04 NOTE — PROCEDURE NOTE - NSINFORMCONSENT_GEN_A_CORE
This was an emergent procedure.
Benefits, risks, and possible complications of procedure explained to patient/caregiver who verbalized understanding and gave written consent.
This was an emergent procedure.

## 2019-07-04 NOTE — PROGRESS NOTE ADULT - ASSESSMENT
ASSESSMENT AND PLAN:  48-yo M w/ PMH of sarcoidosis, pulmonary fibrosis, aspergilloma s/p DAVID lobectomy, transferred to [facility] CT[facility] from [facility] for trach stoma bleeding, c/b Candida auris infection, tranferred to MICU for further management.    #Neuro:  - Intubated  - Alert and awake    #CV:  - On [place] 0.25. BP 81/59 this AM.  - Titrate as needed    #Pulm:  - Hypoxic respiratory failure requiring intubation (7/2)  - Failed CPAP trial  - Will continue on mechanical ventilation at this time. Plan to perform CPAP trial    #ID:  - Candida auris infection  - BCx NGTD 6/30, 7/2.   - Bronch Cx with GPC and GNR  - On cici/Vanc  - ID on board. Appreciate recs.  - Will broaden antibiotics for fungal coverage if patient deteriorates.    #Heme:  - Hgb appears at baseline ~10  - Continue to monitor at this time  - Monitor for bleeding and H/H    #Endo:  - FS ~150s  - Will send A1C  - FS Q6H with tube feeds    #GI:  - NPO with tube feeds    #Renal:  - No acute issues identified    #GOC:  - Full Code  - Contact precaution (Candida auris)    #DVT PPx:  - HSQ.

## 2019-07-04 NOTE — CHART NOTE - NSCHARTNOTEFT_GEN_A_CORE
*** incomplete note ***      MICU Acceptance Note    CONTACT INFO   Peyman Aiken, PGY-2  Internal Medicine, [phone] ([facility]) / [phone] ([facility])    CC: Patient is a 48y old  Male who presents with a chief complaint of Respiratory Failure (03 Jul 2019 18:03)      HPI:  48 year old man with sarcoidosis, pulmonary fibrosis, hx/o aspergilloma s/p DAVID lobectomy secondary to hemoptysis transferred from [facility] for bleeding at trach stoma. Pt had trach inserted as a part of a complicated post op course following Lobectomy. Pt admitted to OSH with concerns for sepsis, was started on Vanco/Cefepime. Pt was remained hemodynimcally stable throughout stay with exception of some tachycardia. Pt was seen by general surgery and the tracheal stoma was cauterized using silver nitrate. Pt was transferred to [facility] for surgical evaluation of bleeding.  Of note- when patient was received at [facility], EMS stated pt was on isolation for a positive Candida auris test. Called [facility] and spoke to the nurse previosuly taking care of patient- patient did indeed have a positive Candida auris swab from ether the groin, axilla or king.  Attempted to reach attending regarding more details, awaiting callback. (29 Jun 2019 12:19)      Seen and examine patient at bedside. Patient reports feeling . Denied fever, chill, n/v/d, CP, SOB, or abdominal pain.       Allergies    No Known Allergies    Intolerances    	    PAST MEDICAL & SURGICAL HISTORY:  History of pneumothorax: History of 3 prior pneumonthoracies.  COPD (chronic obstructive pulmonary disease)  Asthma  Hypertension  Sarcoidosis  History of thoracotomy: 9/24/17 Left thoracotomy, Left upper lobectomy   9/25/17 Reop left thoracotomy, evacuation of left hemothorax      FAMILY HISTORY:  Family history of pancreatic cancer  Family history of essential hypertension      SOCIAL HISTORY:  Tobacco: denies  EtOH: denies  Illicit drugs: denies  Lives with    MEDICATIONS:  heparin  Injectable 5000 Unit(s) SubCutaneous every 12 hours  phenylephrine    Infusion 0.5 MICROgram(s)/kG/Min IV Continuous <Continuous>    meropenem  IVPB 1000 milliGRAM(s) IV Intermittent every 8 hours  vancomycin  IVPB 1000 milliGRAM(s) IV Intermittent every 12 hours      dexmedetomidine Infusion 0.7 MICROgram(s)/kG/Hr IV Continuous <Continuous>  propofol Infusion 40 MICROgram(s)/kG/Min IV Continuous <Continuous>    bisacodyl Suppository 10 milliGRAM(s) Rectal daily PRN  pantoprazole  Injectable 40 milliGRAM(s) IV Push daily    methylPREDNISolone sodium succinate Injectable 40 milliGRAM(s) IV Push every 8 hours    chlorhexidine 4% Liquid 1 Application(s) Topical <User Schedule>      PHYSICAL EXAM:  T(C): 37.3 (07-04-19 @ 04:00), Max: 37.7 (07-03-19 @ 12:00)  HR: 70 (07-04-19 @ 07:05) (63 - 95)  BP: 123/88 (07-04-19 @ 05:00) (94/78 - 126/104)  RR: 23 (07-04-19 @ 06:00) (10 - 23)  SpO2: 100% (07-04-19 @ 07:05) (99% - 100%)  Wt(kg): --  Daily     Daily   I&O's Summary    03 Jul 2019 07:01  -  04 Jul 2019 07:00  --------------------------------------------------------  IN: 3150.9 mL / OUT: 2140 mL / NET: 1010.9 mL        TELEMETRY:     Daily     Daily    Appearance: NAD, well-developed	  HEENT:   Normal oral mucosa, PERRL, EOMI	  Neck: No JVD, no LAD, supple, trachea in midline  Cardiovascular: Normal S1 S2, No JVD, No murmurs  Respiratory: Lungs clear to auscultation, no wheezing, rhonchi  Gastrointestinal:  Soft, Non-tender, nondistended, + BS  Skin: No rashes, No ecchymoses, No cyanosis	  MSK/Extremities: Normal range of motion, 5/5 Muscle strength bilaterally. No clubbing, cyanosis or edema  Vascular: Peripheral pulses palpable 2+ bilaterally  Neurologic: Non-focal, CN II-XII intact  Psychiatry: A & O x 3, Mood & affect appropriate    LABS:	 	                        10.1   16.45 )-----------( 141      ( 04 Jul 2019 04:30 )             33.2     07-04    140  |  92<L>  |  25<H>  ----------------------------<  167<H>  4.7   |  40<H>  |  0.73  07-03    140  |  91<L>  |  27<H>  ----------------------------<  140<H>  4.0   |  42<H>  |  0.80    Ca    9.4      04 Jul 2019 04:30  Ca    9.7      03 Jul 2019 03:10  Phos  3.1     07-04  Phos  3.0     07-02  Mg     2.0     07-04  Mg     2.2     07-02    TPro  6.4  /  Alb  3.5  /  TBili  0.5  /  DBili  x   /  AST  13  /  ALT  20  /  AlkPhos  54  07-03      proBNP:   Lipid Profile:   HgA1c:   TSH:   FS: CAPILLARY BLOOD GLUCOSE        BCX/UCX: BLOOD PERIPHERAL  07-02-19 --  --  --      BRONCHIAL LAVAGE  07-02-19 --  --  --      SPUTUM  06-30-19 --  --  --      BLOOD  06-30-19 --  --  --          CARDIAC MARKERS:       COAGS:    	    ECG:  	  RADIOLOGY:    Imaging Personally Reviewed:  [ ] YES  [ ] NO  Consultant(s) Notes Reviewed:  [X] YES  [ ] NO  Care Discussed with Consultants/Other Providers [ ] YES  [ ] NO        ASSESSMENT AND PLAN:  48-yo M w/ PMH of sarcoidosis, pulmonary fibrosis, aspergilloma s/p DAVID lobectomy, transferred to [facility] CTICU from [facility] for trach stoma bleeding, c/b Candida auris infection, tranferred to MICU for further management.    #Neuro:  - Intubated and sedated    #CV:  - On pressors    #Pulm:  - Hypoxic respiratory failure requiring intubation (7/2)  - Failed CPAP trial  - Will continue on mechanical ventilation.    #ID:  - Candida auris infection  - BCx NGTD 6/20, 7/2.   - Bronch Cx with GPC  - On cici/Vanc  - ID on board. Appreciate recs.    #Heme:  - Hgb appears at baseline ~10  - Continue to monitor at this time  - Monitor for bleeding and H/H    #Endo:  - FS ~150s  - Will send A1C  - FS Q6H with tube feeds    #GI:  - NPO with tube feeds    #Renal:  - No acute issues identified    #GOC:  - Full Code    #DVT PPx: MICU Acceptance Note    CONTACT INFO   Peyman Fergusonne, PGY-2  Internal Medicine, [phone] ([facility]) / [phone] ([facility])    CC: Patient is a 48y old  Male who presents with a chief complaint of Respiratory Failure (03 Jul 2019 18:03)      HPI:  48 year old man with sarcoidosis, pulmonary fibrosis, hx/o aspergilloma s/p DAVID lobectomy secondary to hemoptysis transferred from [facility] for bleeding at trach stoma. Pt had trach inserted as a part of a complicated post op course following Lobectomy. Pt admitted to OSH with concerns for sepsis, was started on Vanco/Cefepime. Pt was remained hemodynimcally stable throughout stay with exception of some tachycardia. Pt was seen by general surgery and the tracheal stoma was cauterized using silver nitrate. Pt was transferred to [facility] for surgical evaluation of bleeding.  Of note- when patient was received at [facility], EMS stated pt was on isolation for a positive Candida auris test. Called [facility] and spoke to the nurse previosuly taking care of patient- patient did indeed have a positive Candida auris swab from ether the groin, axilla or king.  Attempted to reach attending regarding more details, awaiting callback. (29 Jun 2019 12:19)    Patient experienced hypoxic respiratory failure 7/2 requiring intubation and pressor support. Patient was transferred to MICU care for further management.    Seen and examine patient at bedside. Intubated, however awake and alert. Follows simple commands. Denies fever, chills, or chest pain. Other ROS unable to obtain due to difficulty communicating.      Allergies    No Known Allergies    Intolerances    	    PAST MEDICAL & SURGICAL HISTORY:  History of pneumothorax: History of 3 prior pneumonthoracies.  COPD (chronic obstructive pulmonary disease)  Asthma  Hypertension  Sarcoidosis  History of thoracotomy: 9/24/17 Left thoracotomy, Left upper lobectomy   9/25/17 Reop left thoracotomy, evacuation of left hemothorax      FAMILY HISTORY:  Family history of pancreatic cancer  Family history of essential hypertension      SOCIAL HISTORY:  Tobacco: denies  EtOH: denies  Illicit drugs: denies  Lives with    MEDICATIONS:  heparin  Injectable 5000 Unit(s) SubCutaneous every 12 hours  phenylephrine    Infusion 0.5 MICROgram(s)/kG/Min IV Continuous <Continuous>    meropenem  IVPB 1000 milliGRAM(s) IV Intermittent every 8 hours  vancomycin  IVPB 1000 milliGRAM(s) IV Intermittent every 12 hours      dexmedetomidine Infusion 0.7 MICROgram(s)/kG/Hr IV Continuous <Continuous>  propofol Infusion 40 MICROgram(s)/kG/Min IV Continuous <Continuous>    bisacodyl Suppository 10 milliGRAM(s) Rectal daily PRN  pantoprazole  Injectable 40 milliGRAM(s) IV Push daily    methylPREDNISolone sodium succinate Injectable 40 milliGRAM(s) IV Push every 8 hours    chlorhexidine 4% Liquid 1 Application(s) Topical <User Schedule>      PHYSICAL EXAM:  T(C): 37.3 (07-04-19 @ 04:00), Max: 37.7 (07-03-19 @ 12:00)  HR: 70 (07-04-19 @ 07:05) (63 - 95)  BP: 123/88 (07-04-19 @ 05:00) (94/78 - 126/104)  RR: 23 (07-04-19 @ 06:00) (10 - 23)  SpO2: 100% (07-04-19 @ 07:05) (99% - 100%)  Wt(kg): --  Daily     Daily   I&O's Summary    03 Jul 2019 07:01  -  04 Jul 2019 07:00  --------------------------------------------------------  IN: 3150.9 mL / OUT: 2140 mL / NET: 1010.9 mL        TELEMETRY:     Daily     Daily    Appearance: Intubated, awake and alert.  HEENT: PERRLA, EOMI, tube feeds, intubated.  Neck: Trach site covered with clean dressing  Cardiovascular: Normal S1 S2, No JVD, No murmurs  Respiratory: Breath sounds difficult to appreciate. No crackles or wheezes.  Gastrointestinal: Soft, +BS  Skin: No rashes, No ecchymoses, No cyanosis	  MSK/Extremities: No BLE edema  Vascular: Peripheral pulses palpable 2+ bilaterally  Neurologic: Alert and oriented. Unable to assess otherwise  Psychiatry: Unable to assess.    LABS:	 	                        10.1   16.45 )-----------( 141      ( 04 Jul 2019 04:30 )             33.2     07-04    140  |  92<L>  |  25<H>  ----------------------------<  167<H>  4.7   |  40<H>  |  0.73  07-03    140  |  91<L>  |  27<H>  ----------------------------<  140<H>  4.0   |  42<H>  |  0.80    Ca    9.4      04 Jul 2019 04:30  Ca    9.7      03 Jul 2019 03:10  Phos  3.1     07-04  Phos  3.0     07-02  Mg     2.0     07-04  Mg     2.2     07-02    TPro  6.4  /  Alb  3.5  /  TBili  0.5  /  DBili  x   /  AST  13  /  ALT  20  /  AlkPhos  54  07-03      proBNP:   Lipid Profile:   HgA1c:   TSH:   FS: CAPILLARY BLOOD GLUCOSE        BCX/UCX: BLOOD PERIPHERAL  07-02-19 --  --  --      BRONCHIAL LAVAGE  07-02-19 --  --  --      SPUTUM  06-30-19 --  --  --      BLOOD  06-30-19 --  --  --          CARDIAC MARKERS:       COAGS:    	    ECG:  	  RADIOLOGY:    Imaging Personally Reviewed:  [ ] YES  [ ] NO  Consultant(s) Notes Reviewed:  [X] YES  [ ] NO  Care Discussed with Consultants/Other Providers [ ] YES  [ ] NO        ASSESSMENT AND PLAN:  48-yo M w/ PMH of sarcoidosis, pulmonary fibrosis, aspergilloma s/p DAVID lobectomy, transferred to [facility] CTICU from [facility] for trach stoma bleeding, c/b Candida auris infection, tranferred to MICU for further management.    #Neuro:  - Intubated  - Alert and awake    #CV:  - On Eldon 0.25. BP 81/59 this AM.  - Titrate as needed    #Pulm:  - Hypoxic respiratory failure requiring intubation (7/2)  - Failed CPAP trial  - Will continue on mechanical ventilation at this time. Plan to perform CPAP trial    #ID:  - Candida auris infection  - BCx NGTD 6/20, 7/2.   - Bronch Cx with GPC  - On cici/Vanc  - ID on board. Appreciate recs.    #Heme:  - Hgb appears at baseline ~10  - Continue to monitor at this time  - Monitor for bleeding and H/H    #Endo:  - FS ~150s  - Will send A1C  - FS Q6H with tube feeds    #GI:  - NPO with tube feeds    #Renal:  - No acute issues identified    #GOC:  - Full Code    #DVT PPx: MICU Acceptance Note    CONTACT INFO   Peyman Fergusonne, PGY-2  Internal Medicine, [phone] ([facility]) / [phone] ([facility])    CC: Patient is a 48y old  Male who presents with a chief complaint of Respiratory Failure (03 Jul 2019 18:03)      HPI:  48 year old man with sarcoidosis, pulmonary fibrosis, hx/o aspergilloma s/p DAVID lobectomy secondary to hemoptysis transferred from [facility] for bleeding at trach stoma. Pt had trach inserted as a part of a complicated post op course following Lobectomy. Pt admitted to OSH with concerns for sepsis, was started on Vanco/Cefepime. Pt was remained hemodynimcally stable throughout stay with exception of some tachycardia. Pt was seen by general surgery and the tracheal stoma was cauterized using silver nitrate. Pt was transferred to [facility] for surgical evaluation of bleeding.  Of note- when patient was received at [facility], EMS stated pt was on isolation for a positive Candida auris test. Called [facility] and spoke to the nurse previosuly taking care of patient- patient did indeed have a positive Candida auris swab from ether the groin, axilla or king.  Attempted to reach attending regarding more details, awaiting callback. (29 Jun 2019 12:19)    Patient experienced hypoxic respiratory failure 7/2 requiring intubation and pressor support. Patient was transferred to MICU care for further management.    Seen and examine patient at bedside. Intubated, however awake and alert. Follows simple commands. Denies fever, chills, or chest pain. Other ROS unable to obtain due to difficulty communicating.      Allergies    No Known Allergies    Intolerances    	    PAST MEDICAL & SURGICAL HISTORY:  History of pneumothorax: History of 3 prior pneumonthoracies.  COPD (chronic obstructive pulmonary disease)  Asthma  Hypertension  Sarcoidosis  History of thoracotomy: 9/24/17 Left thoracotomy, Left upper lobectomy   9/25/17 Reop left thoracotomy, evacuation of left hemothorax      FAMILY HISTORY:  Family history of pancreatic cancer  Family history of essential hypertension      SOCIAL HISTORY:  Tobacco: denies  EtOH: denies  Illicit drugs: denies  Lives with    MEDICATIONS:  heparin  Injectable 5000 Unit(s) SubCutaneous every 12 hours  phenylephrine    Infusion 0.5 MICROgram(s)/kG/Min IV Continuous <Continuous>    meropenem  IVPB 1000 milliGRAM(s) IV Intermittent every 8 hours  vancomycin  IVPB 1000 milliGRAM(s) IV Intermittent every 12 hours      dexmedetomidine Infusion 0.7 MICROgram(s)/kG/Hr IV Continuous <Continuous>  propofol Infusion 40 MICROgram(s)/kG/Min IV Continuous <Continuous>    bisacodyl Suppository 10 milliGRAM(s) Rectal daily PRN  pantoprazole  Injectable 40 milliGRAM(s) IV Push daily    methylPREDNISolone sodium succinate Injectable 40 milliGRAM(s) IV Push every 8 hours    chlorhexidine 4% Liquid 1 Application(s) Topical <User Schedule>      PHYSICAL EXAM:  T(C): 37.3 (07-04-19 @ 04:00), Max: 37.7 (07-03-19 @ 12:00)  HR: 70 (07-04-19 @ 07:05) (63 - 95)  BP: 123/88 (07-04-19 @ 05:00) (94/78 - 126/104)  RR: 23 (07-04-19 @ 06:00) (10 - 23)  SpO2: 100% (07-04-19 @ 07:05) (99% - 100%)  Wt(kg): --  Daily     Daily   I&O's Summary    03 Jul 2019 07:01  -  04 Jul 2019 07:00  --------------------------------------------------------  IN: 3150.9 mL / OUT: 2140 mL / NET: 1010.9 mL        TELEMETRY:     Daily     Daily    Appearance: Intubated, awake and alert.  HEENT: PERRLA, EOMI, tube feeds, intubated.  Neck: Trach site covered with clean dressing  Cardiovascular: Normal S1 S2, No JVD, No murmurs  Respiratory: Breath sounds difficult to appreciate. No crackles or wheezes.  Gastrointestinal: Soft, +BS  Skin: No rashes, No ecchymoses, No cyanosis	  MSK/Extremities: No BLE edema  Vascular: Peripheral pulses palpable 2+ bilaterally  Neurologic: Alert and oriented. Unable to assess otherwise  Psychiatry: Unable to assess.    LABS:	 	                        10.1   16.45 )-----------( 141      ( 04 Jul 2019 04:30 )             33.2     07-04    140  |  92<L>  |  25<H>  ----------------------------<  167<H>  4.7   |  40<H>  |  0.73  07-03    140  |  91<L>  |  27<H>  ----------------------------<  140<H>  4.0   |  42<H>  |  0.80    Ca    9.4      04 Jul 2019 04:30  Ca    9.7      03 Jul 2019 03:10  Phos  3.1     07-04  Phos  3.0     07-02  Mg     2.0     07-04  Mg     2.2     07-02    TPro  6.4  /  Alb  3.5  /  TBili  0.5  /  DBili  x   /  AST  13  /  ALT  20  /  AlkPhos  54  07-03      proBNP:   Lipid Profile:   HgA1c:   TSH:   FS: CAPILLARY BLOOD GLUCOSE        BCX/UCX: BLOOD PERIPHERAL  07-02-19 --  --  --      BRONCHIAL LAVAGE  07-02-19 --  --  --      SPUTUM  06-30-19 --  --  --      BLOOD  06-30-19 --  --  --          CARDIAC MARKERS:       COAGS:    	    ECG:  	  RADIOLOGY:    Imaging Personally Reviewed:  [ ] YES  [ ] NO  Consultant(s) Notes Reviewed:  [X] YES  [ ] NO  Care Discussed with Consultants/Other Providers [ ] YES  [ ] NO        ASSESSMENT AND PLAN:  48-yo M w/ PMH of sarcoidosis, pulmonary fibrosis, aspergilloma s/p DAVID lobectomy, transferred to [facility] CTICU from [facility] for trach stoma bleeding, c/b Candida auris infection, tranferred to MICU for further management.    #Neuro:  - Intubated  - Alert and awake    #CV:  - On Eldon 0.25. BP 81/59 this AM.  - Titrate as needed    #Pulm:  - Hypoxic respiratory failure requiring intubation (7/2)  - Failed CPAP trial  - Will continue on mechanical ventilation at this time. Plan to perform CPAP trial    #ID:  - Candida auris infection  - BCx NGTD 6/20, 7/2.   - Bronch Cx with GPC  - On cici/Vanc  - ID on board. Appreciate recs.    #Heme:  - Hgb appears at baseline ~10  - Continue to monitor at this time  - Monitor for bleeding and H/H    #Endo:  - FS ~150s  - Will send A1C  - FS Q6H with tube feeds    #GI:  - NPO with tube feeds    #Renal:  - No acute issues identified    #GOC:  - Full Code    #DVT PPx:  - HSQ. MICU Acceptance Note    CONTACT INFO   Peyman Fergusonne, PGY-2  Internal Medicine, [phone] ([facility]) / [phone] ([facility])    CC: Patient is a 48y old  Male who presents with a chief complaint of Respiratory Failure (03 Jul 2019 18:03)      HPI:  48 year old man with sarcoidosis, pulmonary fibrosis, hx/o aspergilloma s/p DAVID lobectomy secondary to hemoptysis transferred from [facility] for bleeding at trach stoma. Pt had trach inserted as a part of a complicated post op course following Lobectomy. Pt admitted to OSH with concerns for sepsis, was started on Vanco/Cefepime. Pt was remained hemodynimcally stable throughout stay with exception of some tachycardia. Pt was seen by general surgery and the tracheal stoma was cauterized using silver nitrate. Pt was transferred to [facility] for surgical evaluation of bleeding.  Of note- when patient was received at [facility], EMS stated pt was on isolation for a positive Candida auris test. Called [facility] and spoke to the nurse previosuly taking care of patient- patient did indeed have a positive Candida auris swab from ether the groin, axilla or king.  Attempted to reach attending regarding more details, awaiting callback. (29 Jun 2019 12:19)    Patient experienced hypoxic respiratory failure 7/2 requiring intubation and pressor support. Patient was transferred to MICU care for further management.    Seen and examine patient at bedside. Intubated, however awake and alert. Follows simple commands. Denies fever, chills, or chest pain. Other ROS unable to obtain due to difficulty communicating.      Allergies    No Known Allergies    Intolerances    	    PAST MEDICAL & SURGICAL HISTORY:  History of pneumothorax: History of 3 prior pneumonthoracies.  COPD (chronic obstructive pulmonary disease)  Asthma  Hypertension  Sarcoidosis  History of thoracotomy: 9/24/17 Left thoracotomy, Left upper lobectomy   9/25/17 Reop left thoracotomy, evacuation of left hemothorax      FAMILY HISTORY:  Family history of pancreatic cancer  Family history of essential hypertension      SOCIAL HISTORY:  Tobacco: denies  EtOH: denies  Illicit drugs: denies  Lives with    MEDICATIONS:  heparin  Injectable 5000 Unit(s) SubCutaneous every 12 hours  phenylephrine    Infusion 0.5 MICROgram(s)/kG/Min IV Continuous <Continuous>    meropenem  IVPB 1000 milliGRAM(s) IV Intermittent every 8 hours  vancomycin  IVPB 1000 milliGRAM(s) IV Intermittent every 12 hours      dexmedetomidine Infusion 0.7 MICROgram(s)/kG/Hr IV Continuous <Continuous>  propofol Infusion 40 MICROgram(s)/kG/Min IV Continuous <Continuous>    bisacodyl Suppository 10 milliGRAM(s) Rectal daily PRN  pantoprazole  Injectable 40 milliGRAM(s) IV Push daily    methylPREDNISolone sodium succinate Injectable 40 milliGRAM(s) IV Push every 8 hours    chlorhexidine 4% Liquid 1 Application(s) Topical <User Schedule>      PHYSICAL EXAM:  T(C): 37.3 (07-04-19 @ 04:00), Max: 37.7 (07-03-19 @ 12:00)  HR: 70 (07-04-19 @ 07:05) (63 - 95)  BP: 123/88 (07-04-19 @ 05:00) (94/78 - 126/104)  RR: 23 (07-04-19 @ 06:00) (10 - 23)  SpO2: 100% (07-04-19 @ 07:05) (99% - 100%)  Wt(kg): --  Daily     Daily   I&O's Summary    03 Jul 2019 07:01  -  04 Jul 2019 07:00  --------------------------------------------------------  IN: 3150.9 mL / OUT: 2140 mL / NET: 1010.9 mL        TELEMETRY:     Daily     Daily    Appearance: Intubated, awake and alert.  HEENT: PERRLA, EOMI, tube feeds, intubated.  Neck: Trach site covered with clean dressing  Cardiovascular: Normal S1 S2, No JVD, No murmurs  Respiratory: Breath sounds difficult to appreciate. No crackles or wheezes.  Gastrointestinal: Soft, +BS  Skin: No rashes, No ecchymoses, No cyanosis	  MSK/Extremities: No BLE edema  Vascular: Peripheral pulses palpable 2+ bilaterally  Neurologic: Alert and oriented. Unable to assess otherwise  Psychiatry: Unable to assess.    LABS:	 	                        10.1   16.45 )-----------( 141      ( 04 Jul 2019 04:30 )             33.2     07-04    140  |  92<L>  |  25<H>  ----------------------------<  167<H>  4.7   |  40<H>  |  0.73  07-03    140  |  91<L>  |  27<H>  ----------------------------<  140<H>  4.0   |  42<H>  |  0.80    Ca    9.4      04 Jul 2019 04:30  Ca    9.7      03 Jul 2019 03:10  Phos  3.1     07-04  Phos  3.0     07-02  Mg     2.0     07-04  Mg     2.2     07-02    TPro  6.4  /  Alb  3.5  /  TBili  0.5  /  DBili  x   /  AST  13  /  ALT  20  /  AlkPhos  54  07-03      proBNP:   Lipid Profile:   HgA1c:   TSH:   FS: CAPILLARY BLOOD GLUCOSE        BCX/UCX: BLOOD PERIPHERAL  07-02-19 --  --  --      BRONCHIAL LAVAGE  07-02-19 --  --  --      SPUTUM  06-30-19 --  --  --      BLOOD  06-30-19 --  --  --          CARDIAC MARKERS:       COAGS:    	    ECG:  	  RADIOLOGY:    Imaging Personally Reviewed:  [ ] YES  [ ] NO  Consultant(s) Notes Reviewed:  [X] YES  [ ] NO  Care Discussed with Consultants/Other Providers [ ] YES  [ ] NO        ASSESSMENT AND PLAN:  48-yo M w/ PMH of sarcoidosis, pulmonary fibrosis, aspergilloma s/p DAVID lobectomy, transferred to [facility] CTICU from [facility] for trach stoma bleeding, c/b Candida auris infection, tranferred to MICU for further management.    #Neuro:  - Intubated  - Alert and awake    #CV:  - On [place] 0.25. BP 81/59 this AM.  - Titrate as needed    #Pulm:  - Hypoxic respiratory failure requiring intubation (7/2)  - Failed CPAP trial  - Will continue on mechanical ventilation at this time. Plan to perform CPAP trial    #ID:  - Candida auris infection  - BCx NGTD 6/20, 7/2.   - Bronch Cx with GPC  - On cici/Vanc  - ID on board. Appreciate recs.    #Heme:  - Hgb appears at baseline ~10  - Continue to monitor at this time  - Monitor for bleeding and H/H    #Endo:  - FS ~150s  - Will send A1C  - FS Q6H with tube feeds    #GI:  - NPO with tube feeds    #Renal:  - No acute issues identified    #GOC:  - Full Code  - Contact precaution (Candida auris)    #DVT PPx:  - HSQ. MICU Acceptance Note    CONTACT INFO   Peyman Fergusonne, PGY-2  Internal Medicine, [phone] ([facility]) / [phone] ([facility])    CC: Patient is a 48y old  Male who presents with a chief complaint of Respiratory Failure (03 Jul 2019 18:03)      HPI:  48 year old man with sarcoidosis, pulmonary fibrosis, hx/o aspergilloma s/p DAVID lobectomy secondary to hemoptysis transferred from [facility] for bleeding at trach stoma. Pt had trach inserted as a part of a complicated post op course following Lobectomy. Pt admitted to OSH with concerns for sepsis, was started on Vanco/Cefepime. Pt was remained hemodynimcally stable throughout stay with exception of some tachycardia. Pt was seen by general surgery and the tracheal stoma was cauterized using silver nitrate. Pt was transferred to [facility] for surgical evaluation of bleeding.  Of note- when patient was received at [facility], EMS stated pt was on isolation for a positive Candida auris test. Called [facility] and spoke to the nurse previosuly taking care of patient- patient did indeed have a positive Candida auris swab from ether the groin, axilla or king.  Attempted to reach attending regarding more details, awaiting callback. (29 Jun 2019 12:19)    Patient experienced hypoxic respiratory failure with pCO2 >100 on 7/2, requiring intubation and pressor support. Patient was transferred to MICU care for further management. Patient was placed on Precedex, Eldon, and propofol. Patient was seen and examine patient at bedside. Intubated, however awake and alert. Follows simple commands. Denies fever, chills, or chest pain. Other ROS unable to obtain due to difficulty communicating.      Allergies    No Known Allergies    Intolerances    	    PAST MEDICAL & SURGICAL HISTORY:  History of pneumothorax: History of 3 prior pneumonthoracies.  COPD (chronic obstructive pulmonary disease)  Asthma  Hypertension  Sarcoidosis  History of thoracotomy: 9/24/17 Left thoracotomy, Left upper lobectomy   9/25/17 Reop left thoracotomy, evacuation of left hemothorax      FAMILY HISTORY:  Family history of pancreatic cancer  Family history of essential hypertension      SOCIAL HISTORY:  Tobacco: denies  EtOH: denies  Illicit drugs: denies  Lives with    MEDICATIONS:  heparin  Injectable 5000 Unit(s) SubCutaneous every 12 hours  phenylephrine    Infusion 0.5 MICROgram(s)/kG/Min IV Continuous <Continuous>    meropenem  IVPB 1000 milliGRAM(s) IV Intermittent every 8 hours  vancomycin  IVPB 1000 milliGRAM(s) IV Intermittent every 12 hours      dexmedetomidine Infusion 0.7 MICROgram(s)/kG/Hr IV Continuous <Continuous>  propofol Infusion 40 MICROgram(s)/kG/Min IV Continuous <Continuous>    bisacodyl Suppository 10 milliGRAM(s) Rectal daily PRN  pantoprazole  Injectable 40 milliGRAM(s) IV Push daily    methylPREDNISolone sodium succinate Injectable 40 milliGRAM(s) IV Push every 8 hours    chlorhexidine 4% Liquid 1 Application(s) Topical <User Schedule>      PHYSICAL EXAM:  T(C): 37.3 (07-04-19 @ 04:00), Max: 37.7 (07-03-19 @ 12:00)  HR: 70 (07-04-19 @ 07:05) (63 - 95)  BP: 123/88 (07-04-19 @ 05:00) (94/78 - 126/104)  RR: 23 (07-04-19 @ 06:00) (10 - 23)  SpO2: 100% (07-04-19 @ 07:05) (99% - 100%)  Wt(kg): --  Daily     Daily   I&O's Summary    03 Jul 2019 07:01  -  04 Jul 2019 07:00  --------------------------------------------------------  IN: 3150.9 mL / OUT: 2140 mL / NET: 1010.9 mL        TELEMETRY:     Daily     Daily    Appearance: Intubated, awake and alert.  HEENT: PERRLA, EOMI, tube feeds, intubated.  Neck: Trach site covered with clean dressing  Cardiovascular: Normal S1 S2, No JVD, No murmurs  Respiratory: Breath sounds difficult to appreciate. No crackles or wheezes.  Gastrointestinal: Soft, +BS  Skin: No rashes, No ecchymoses, No cyanosis	  MSK/Extremities: No BLE edema  Vascular: Peripheral pulses palpable 2+ bilaterally  Neurologic: Alert and oriented. Unable to assess otherwise  Psychiatry: Unable to assess.    LABS:	 	                        10.1   16.45 )-----------( 141      ( 04 Jul 2019 04:30 )             33.2     07-04    140  |  92<L>  |  25<H>  ----------------------------<  167<H>  4.7   |  40<H>  |  0.73  07-03    140  |  91<L>  |  27<H>  ----------------------------<  140<H>  4.0   |  42<H>  |  0.80    Ca    9.4      04 Jul 2019 04:30  Ca    9.7      03 Jul 2019 03:10  Phos  3.1     07-04  Phos  3.0     07-02  Mg     2.0     07-04  Mg     2.2     07-02    TPro  6.4  /  Alb  3.5  /  TBili  0.5  /  DBili  x   /  AST  13  /  ALT  20  /  AlkPhos  54  07-03      proBNP:   Lipid Profile:   HgA1c:   TSH:   FS: CAPILLARY BLOOD GLUCOSE        BCX/UCX: BLOOD PERIPHERAL  07-02-19 --  --  --      BRONCHIAL LAVAGE  07-02-19 --  --  --      SPUTUM  06-30-19 --  --  --      BLOOD  06-30-19 --  --  --          CARDIAC MARKERS:       COAGS:    	    ECG:  	  RADIOLOGY:    Imaging Personally Reviewed:  [ ] YES  [ ] NO  Consultant(s) Notes Reviewed:  [X] YES  [ ] NO  Care Discussed with Consultants/Other Providers [ ] YES  [ ] NO        ASSESSMENT AND PLAN:  48-yo M w/ PMH of sarcoidosis, pulmonary fibrosis, aspergilloma s/p DAVID lobectomy, transferred to [facility] CTICU from [facility] for trach stoma bleeding, c/b Candida auris infection, tranferred to MICU for further management.    #Neuro:  - Intubated  - Alert and awake    #CV:  - On Eldon 0.25. BP 81/59 this AM.  - Titrate as needed    #Pulm:  - Hypoxic respiratory failure requiring intubation (7/2)  - Failed CPAP trial  - Will continue on mechanical ventilation at this time. Plan to perform CPAP trial    #ID:  - Candida auris infection  - BCx NGTD 6/20, 7/2.   - Bronch Cx with GPC  - On cici/Vanc  - ID on board. Appreciate recs.    #Heme:  - Hgb appears at baseline ~10  - Continue to monitor at this time  - Monitor for bleeding and H/H    #Endo:  - FS ~150s  - Will send A1C  - FS Q6H with tube feeds    #GI:  - NPO with tube feeds    #Renal:  - No acute issues identified    #GOC:  - Full Code  - Contact precaution (Candida auris)    #DVT PPx:  - HSQ. MICU Acceptance Note    CONTACT INFO   Peyman Fergusonne, PGY-2  Internal Medicine, [phone] ([facility]) / [id number] ([facility])    CC: Patient is a 48y old  Male who presents with a chief complaint of Respiratory Failure (03 Jul 2019 18:03)      HPI:  48 year old man with sarcoidosis, pulmonary fibrosis, hx/o aspergilloma s/p DAVID lobectomy secondary to hemoptysis transferred from [facility] for bleeding at trach stoma. Pt had trach inserted as a part of a complicated post op course following Lobectomy. Pt admitted to OSH with concerns for sepsis, was started on Vanco/Cefepime. Pt was remained hemodynimcally stable throughout stay with exception of some tachycardia. Pt was seen by general surgery and the tracheal stoma was cauterized using silver nitrate. Pt was transferred to [facility] for surgical evaluation of bleeding.  Of note- when patient was received at [facility], EMS stated pt was on isolation for a positive Candida auris test. Called [facility] and spoke to the nurse previosuly taking care of patient- patient did indeed have a positive Candida auris swab from ether the groin, axilla or king.  Attempted to reach attending regarding more details, awaiting callback. (29 Jun 2019 12:19)    Patient experienced hypoxic respiratory failure with pCO2 >100 on 7/2, requiring intubation and pressor support. Patient was transferred to MICU care for further management. Patient was placed on Precedex, Eldon, and propofol. Patient was seen and examine patient at bedside. Intubated, however awake and alert. Follows simple commands. Denies fever, chills, or chest pain. Other ROS unable to obtain due to difficulty communicating.      Allergies    No Known Allergies    Intolerances    	    PAST MEDICAL & SURGICAL HISTORY:  History of pneumothorax: History of 3 prior pneumonthoracies.  COPD (chronic obstructive pulmonary disease)  Asthma  Hypertension  Sarcoidosis  History of thoracotomy: 9/24/17 Left thoracotomy, Left upper lobectomy   9/25/17 Reop left thoracotomy, evacuation of left hemothorax      FAMILY HISTORY:  Family history of pancreatic cancer  Family history of essential hypertension      SOCIAL HISTORY:  Tobacco: denies  EtOH: denies  Illicit drugs: denies  Lives with    MEDICATIONS:  heparin  Injectable 5000 Unit(s) SubCutaneous every 12 hours  phenylephrine    Infusion 0.5 MICROgram(s)/kG/Min IV Continuous <Continuous>    meropenem  IVPB 1000 milliGRAM(s) IV Intermittent every 8 hours  vancomycin  IVPB 1000 milliGRAM(s) IV Intermittent every 12 hours      dexmedetomidine Infusion 0.7 MICROgram(s)/kG/Hr IV Continuous <Continuous>  propofol Infusion 40 MICROgram(s)/kG/Min IV Continuous <Continuous>    bisacodyl Suppository 10 milliGRAM(s) Rectal daily PRN  pantoprazole  Injectable 40 milliGRAM(s) IV Push daily    methylPREDNISolone sodium succinate Injectable 40 milliGRAM(s) IV Push every 8 hours    chlorhexidine 4% Liquid 1 Application(s) Topical <User Schedule>      PHYSICAL EXAM:  T(C): 37.3 (07-04-19 @ 04:00), Max: 37.7 (07-03-19 @ 12:00)  HR: 70 (07-04-19 @ 07:05) (63 - 95)  BP: 123/88 (07-04-19 @ 05:00) (94/78 - 126/104)  RR: 23 (07-04-19 @ 06:00) (10 - 23)  SpO2: 100% (07-04-19 @ 07:05) (99% - 100%)  Wt(kg): --  Daily     Daily   I&O's Summary    03 Jul 2019 07:01  -  04 Jul 2019 07:00  --------------------------------------------------------  IN: 3150.9 mL / OUT: 2140 mL / NET: 1010.9 mL        TELEMETRY:     Daily     Daily    Appearance: Intubated, awake and alert.  HEENT: PERRLA, EOMI, tube feeds, intubated.  Neck: Trach site covered with clean dressing  Cardiovascular: Normal S1 S2, No JVD, No murmurs  Respiratory: Breath sounds difficult to appreciate. No crackles or wheezes.  Gastrointestinal: Soft, +BS  Skin: No rashes, No ecchymoses, No cyanosis	  MSK/Extremities: No BLE edema  Vascular: Peripheral pulses palpable 2+ bilaterally  Neurologic: Alert and oriented. Unable to assess otherwise  Psychiatry: Unable to assess.    LABS:	 	                        10.1   16.45 )-----------( 141      ( 04 Jul 2019 04:30 )             33.2     07-04    140  |  92<L>  |  25<H>  ----------------------------<  167<H>  4.7   |  40<H>  |  0.73  07-03    140  |  91<L>  |  27<H>  ----------------------------<  140<H>  4.0   |  42<H>  |  0.80    Ca    9.4      04 Jul 2019 04:30  Ca    9.7      03 Jul 2019 03:10  Phos  3.1     07-04  Phos  3.0     07-02  Mg     2.0     07-04  Mg     2.2     07-02    TPro  6.4  /  Alb  3.5  /  TBili  0.5  /  DBili  x   /  AST  13  /  ALT  20  /  AlkPhos  54  07-03      proBNP:   Lipid Profile:   HgA1c:   TSH:   FS: CAPILLARY BLOOD GLUCOSE        BCX/UCX: BLOOD PERIPHERAL  07-02-19 --  --  --      BRONCHIAL LAVAGE  07-02-19 --  --  --      SPUTUM  06-30-19 --  --  --      BLOOD  06-30-19 --  --  --          CARDIAC MARKERS:       COAGS:    	    ECG:  	  RADIOLOGY:    Imaging Personally Reviewed:  [ ] YES  [ ] NO  Consultant(s) Notes Reviewed:  [X] YES  [ ] NO  Care Discussed with Consultants/Other Providers [ ] YES  [ ] NO        ASSESSMENT AND PLAN:  48-yo M w/ PMH of sarcoidosis, pulmonary fibrosis, aspergilloma s/p DAVID lobectomy, transferred to [facility] CTICU from [facility] for trach stoma bleeding, c/b Candida auris infection, tranferred to MICU for further management.    #Neuro:  - Intubated  - Alert and awake    #CV:  - On Eldon 0.25. BP 81/59 this AM.  - Titrate as needed    #Pulm:  - Hypoxic respiratory failure requiring intubation (7/2)  - Failed CPAP trial  - Will continue on mechanical ventilation at this time. Plan to perform CPAP trial    #ID:  - Candida auris infection  - BCx NGTD 6/30, 7/2.   - Bronch Cx with GPC and GNR  - On cici/Vanc  - ID on board. Appreciate recs.  - Will broaden antibiotics for fungal coverage if patient deteriorates.    #Heme:  - Hgb appears at baseline ~10  - Continue to monitor at this time  - Monitor for bleeding and H/H    #Endo:  - FS ~150s  - Will send A1C  - FS Q6H with tube feeds    #GI:  - NPO with tube feeds    #Renal:  - No acute issues identified    #GOC:  - Full Code  - Contact precaution (Candida auris)    #DVT PPx:  - HSQ.

## 2019-07-04 NOTE — PROGRESS NOTE ADULT - SUBJECTIVE AND OBJECTIVE BOX
Follow Up:  +BAL    Interval History/ROS:  remains intubated.  sedated and on pressors.  unable to obtain ROS.       Allergies  No Known Allergies    ANTIMICROBIALS:   meropenem  IVPB 1000 every 8 hours  vancomycin  IVPB 1000 every 12 hours    MEDICATIONS  (STANDING):  dexmedetomidine Infusion 0.7 <Continuous>  heparin  Injectable 5000 every 12 hours  methylPREDNISolone sodium succinate Injectable 40 every 8 hours  pantoprazole  Injectable 40 daily  phenylephrine    Infusion 0.5 <Continuous>  propofol Infusion 40 <Continuous>    Vital Signs Last 24 Hrs  T(F): 99.1 (07-04-19 @ 04:00), Max: 99.9 (07-03-19 @ 12:00)  HR: 67 (07-04-19 @ 09:00)  BP: 98/73 (07-04-19 @ 08:00)  RR: 12 (07-04-19 @ 09:00)  SpO2: 100% (07-04-19 @ 09:00) (99% - 100%)  Wt(kg): --    PHYSICAL EXAM:  General: Intubated  HEAD/EYES:  eyes closed  ENT: orally intubated  Cardiovascular:  normal S1, S2  Respiratory: coarse BS some wheezing  GI:  soft, non-tender, normal bowel sounds  Musculoskeletal:  no synovitis  Neurologic:  sedated  Skin:  no rash  Lymph: no cervical lymphadenopathy  Psychiatric:  sedated; difficult to assess  Lines: no phlebitis                        10.1   16.45 )-----------( 141      ( 04 Jul 2019 04:30 )             33.2 07-04    140  |  92  |  25  ----------------------------<  167  4.7   |  40  |  0.73  Ca    9.4      04 Jul 2019 04:30Phos  3.1     07-04Mg     2.0     07-04  TPro  6.4  /  Alb  3.5  /  TBili  0.5  /  DBili  x   /  AST  13  /  ALT  20  /  AlkPhos  54  07-03    WBC Count: 16.45 (07-04-19 @ 04:30)  WBC Count: 19.29 (07-03-19 @ 03:10)  WBC Count: 13.75 (07-02-19 @ 07:40)  WBC Count: 14.69 (07-01-19 @ 05:00)  WBC Count: 11.02 (06-30-19 @ 03:10)  WBC Count: 13.22 (06-29-19 @ 16:40)    MICROBIOLOGY:  Culture - Blood (07.02.19 @ 21:25)    Culture - Blood:   NO ORGANISMS ISOLATED  NO ORGANISMS ISOLATED AT 24 HOURS    Specimen Source: BLOOD PERIPHERAL    Culture - Respiratory with Gram Stain (07.02.19 @ 12:24)    Culture - Respiratory:   GNRID^Gram Neg Jordan To Be Identified  QUANTITY OF GROWTH: RARE    Gram Stain Sputum:   GPCPR^Gram Pos Cocci in Pairs  QUANTITY OF BACTERIA SEEN: RARE (1+)  WBC^White Blood Cells  QNTY CELLS IN GRAM STAIN: FEW (2+)    Specimen Source: BRONCHIAL LAVAGE    Culture - Blood (06.30.19 @ 14:56)    Culture - Blood:   NO ORGANISMS ISOLATED  NO ORGANISMS ISOLATED AT 48 HRS.    Specimen Source: BLOOD    RADIOLOGY:  Xray Chest 1 View- PORTABLE-Urgent (07.03.19 @ 15:32) >  IMPRESSION: ET tube has its tip above the mel. Enteric tube has its tip in the stomach. Please note that evaluation of the lungs are limited as the upper aspect of the lungs on the left lateral portions of the left hemithorax are not imaged.  There is a small right pleural effusion. Previously noted loculated left pneumothorax and right lung cysts are again seen.

## 2019-07-04 NOTE — PROCEDURE NOTE - ADDITIONAL PROCEDURE DETAILS
ultrasound guided line placement  line located in vein before placement and aspirated  lung sliding present before and after
Rt subclavian IJ previously placed by day team noted to be malpositioned and coiled in arm. No medications administered through it . Line removed and replacement inserted in Lt IJ without complication

## 2019-07-04 NOTE — PROGRESS NOTE ADULT - ASSESSMENT
48M with known sarcoidosis presents with recent episode of hemoptysis from stoma.  He was treated for pneumonia at outside hospital.  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  Currently critically ill with new hypercapneic respiratory failure.  Sputum studies are pending.  Leukocytosis is a little better today    Suggest:  - f/u sputum studies  - continue vancomycin/meropenem  - if he clinically worsens, can add micafungin pending blood culture results  - continue contact isolation

## 2019-07-04 NOTE — PROGRESS NOTE ADULT - SUBJECTIVE AND OBJECTIVE BOX
MICU Acceptance Note    CONTACT INFO   Peyman Fergusonne, PGY-2  Internal Medicine, [phone] ([facility]) / [phone] ([facility])    CC: Patient is a 48y old  Male who presents with a chief complaint of Respiratory Failure (03 Jul 2019 18:03)      HPI:  48 year old man with sarcoidosis, pulmonary fibrosis, hx/o aspergilloma s/p DAVID lobectomy secondary to hemoptysis transferred from [facility] for bleeding at trach stoma. Pt had trach inserted as a part of a complicated post op course following Lobectomy. Pt admitted to OSH with concerns for sepsis, was started on Vanco/Cefepime. Pt was remained hemodynimcally stable throughout stay with exception of some tachycardia. Pt was seen by general surgery and the tracheal stoma was cauterized using silver nitrate. Pt was transferred to [facility] for surgical evaluation of bleeding.  Of note- when patient was received at [facility], EMS stated pt was on isolation for a positive Candida auris test. Called [facility] and spoke to the nurse previosuly taking care of patient- patient did indeed have a positive Candida auris swab from ether the groin, axilla or king.  Attempted to reach attending regarding more details, awaiting callback. (29 Jun 2019 12:19)    Patient experienced hypoxic respiratory failure with pCO2 >100 on 7/2, requiring intubation and pressor support. Patient was transferred to MICU care for further management. Patient was placed on Precedex, Eldon, and propofol. Patient was seen and examine patient at bedside. Intubated, however awake and alert. Follows simple commands. Denies fever, chills, or chest pain. Other ROS unable to obtain due to difficulty communicating.      Allergies    No Known Allergies    Intolerances    	    PAST MEDICAL & SURGICAL HISTORY:  History of pneumothorax: History of 3 prior pneumonthoracies.  COPD (chronic obstructive pulmonary disease)  Asthma  Hypertension  Sarcoidosis  History of thoracotomy: 9/24/17 Left thoracotomy, Left upper lobectomy   9/25/17 Reop left thoracotomy, evacuation of left hemothorax      FAMILY HISTORY:  Family history of pancreatic cancer  Family history of essential hypertension      SOCIAL HISTORY:  Tobacco: denies  EtOH: denies  Illicit drugs: denies  Lives with    MEDICATIONS:  heparin  Injectable 5000 Unit(s) SubCutaneous every 12 hours  phenylephrine    Infusion 0.5 MICROgram(s)/kG/Min IV Continuous <Continuous>    meropenem  IVPB 1000 milliGRAM(s) IV Intermittent every 8 hours  vancomycin  IVPB 1000 milliGRAM(s) IV Intermittent every 12 hours      dexmedetomidine Infusion 0.7 MICROgram(s)/kG/Hr IV Continuous <Continuous>  propofol Infusion 40 MICROgram(s)/kG/Min IV Continuous <Continuous>    bisacodyl Suppository 10 milliGRAM(s) Rectal daily PRN  pantoprazole  Injectable 40 milliGRAM(s) IV Push daily    methylPREDNISolone sodium succinate Injectable 40 milliGRAM(s) IV Push every 8 hours    chlorhexidine 4% Liquid 1 Application(s) Topical <User Schedule>      PHYSICAL EXAM:  T(C): 37.3 (07-04-19 @ 04:00), Max: 37.7 (07-03-19 @ 12:00)  HR: 70 (07-04-19 @ 07:05) (63 - 95)  BP: 123/88 (07-04-19 @ 05:00) (94/78 - 126/104)  RR: 23 (07-04-19 @ 06:00) (10 - 23)  SpO2: 100% (07-04-19 @ 07:05) (99% - 100%)  Wt(kg): --  Daily     Daily   I&O's Summary    03 Jul 2019 07:01  -  04 Jul 2019 07:00  --------------------------------------------------------  IN: 3150.9 mL / OUT: 2140 mL / NET: 1010.9 mL        TELEMETRY:     Daily     Daily    Appearance: Intubated, awake and alert.  HEENT: PERRLA, EOMI, tube feeds, intubated.  Neck: Trach site covered with clean dressing  Cardiovascular: Normal S1 S2, No JVD, No murmurs  Respiratory: Breath sounds difficult to appreciate. No crackles or wheezes.  Gastrointestinal: Soft, +BS  Skin: No rashes, No ecchymoses, No cyanosis	  MSK/Extremities: No BLE edema  Vascular: Peripheral pulses palpable 2+ bilaterally  Neurologic: Alert and oriented. Unable to assess otherwise  Psychiatry: Unable to assess.    LABS:	 	                        10.1   16.45 )-----------( 141      ( 04 Jul 2019 04:30 )             33.2     07-04    140  |  92<L>  |  25<H>  ----------------------------<  167<H>  4.7   |  40<H>  |  0.73  07-03    140  |  91<L>  |  27<H>  ----------------------------<  140<H>  4.0   |  42<H>  |  0.80    Ca    9.4      04 Jul 2019 04:30  Ca    9.7      03 Jul 2019 03:10  Phos  3.1     07-04  Phos  3.0     07-02  Mg     2.0     07-04  Mg     2.2     07-02    TPro  6.4  /  Alb  3.5  /  TBili  0.5  /  DBili  x   /  AST  13  /  ALT  20  /  AlkPhos  54  07-03      proBNP:   Lipid Profile:   HgA1c:   TSH:   FS: CAPILLARY BLOOD GLUCOSE        BCX/UCX: BLOOD PERIPHERAL  07-02-19 --  --  --      BRONCHIAL LAVAGE  07-02-19 --  --  --      SPUTUM  06-30-19 --  --  --      BLOOD  06-30-19 --  --  --          CARDIAC MARKERS:       COAGS:    	    ECG:  	  RADIOLOGY:    Imaging Personally Reviewed:  [ ] YES  [ ] NO  Consultant(s) Notes Reviewed:  [X] YES  [ ] NO  Care Discussed with Consultants/Other Providers [ ] YES  [ ] NO

## 2019-07-05 LAB
-  AMIKACIN: SIGNIFICANT CHANGE UP
-  AZTREONAM: SIGNIFICANT CHANGE UP
-  CEFEPIME: SIGNIFICANT CHANGE UP
-  CEFTAZIDIME: SIGNIFICANT CHANGE UP
-  CIPROFLOXACIN: SIGNIFICANT CHANGE UP
-  GENTAMICIN: SIGNIFICANT CHANGE UP
-  IMIPENEM: SIGNIFICANT CHANGE UP
-  LEVOFLOXACIN: SIGNIFICANT CHANGE UP
-  MEROPENEM: SIGNIFICANT CHANGE UP
-  PIPERACILLIN/TAZOBACTAM: SIGNIFICANT CHANGE UP
-  TOBRAMYCIN: SIGNIFICANT CHANGE UP
ALBUMIN SERPL ELPH-MCNC: 2.8 G/DL — LOW (ref 3.3–5)
ALBUMIN SERPL ELPH-MCNC: 2.8 G/DL — LOW (ref 3.3–5)
ALBUMIN SERPL ELPH-MCNC: 3 G/DL — LOW (ref 3.3–5)
ALP SERPL-CCNC: 51 U/L — SIGNIFICANT CHANGE UP (ref 40–120)
ALP SERPL-CCNC: 68 U/L — SIGNIFICANT CHANGE UP (ref 40–120)
ALP SERPL-CCNC: 75 U/L — SIGNIFICANT CHANGE UP (ref 40–120)
ALT FLD-CCNC: 17 U/L — SIGNIFICANT CHANGE UP (ref 4–41)
ALT FLD-CCNC: 55 U/L — HIGH (ref 4–41)
ALT FLD-CCNC: 83 U/L — HIGH (ref 4–41)
ANION GAP SERPL CALC-SCNC: 10 MMO/L — SIGNIFICANT CHANGE UP (ref 7–14)
ANION GAP SERPL CALC-SCNC: 10 MMO/L — SIGNIFICANT CHANGE UP (ref 7–14)
ANION GAP SERPL CALC-SCNC: 8 MMO/L — SIGNIFICANT CHANGE UP (ref 7–14)
APTT BLD: 24.7 SEC — LOW (ref 27.5–36.3)
AST SERPL-CCNC: 7 U/L — SIGNIFICANT CHANGE UP (ref 4–40)
AST SERPL-CCNC: 74 U/L — HIGH (ref 4–40)
AST SERPL-CCNC: 81 U/L — HIGH (ref 4–40)
BACTERIA BLD CULT: SIGNIFICANT CHANGE UP
BACTERIA SPT RESP CULT: SIGNIFICANT CHANGE UP
BASE EXCESS BLDA CALC-SCNC: 12.7 MMOL/L — SIGNIFICANT CHANGE UP
BASE EXCESS BLDA CALC-SCNC: 15.5 MMOL/L — SIGNIFICANT CHANGE UP
BASE EXCESS BLDA CALC-SCNC: 7.6 MMOL/L — SIGNIFICANT CHANGE UP
BASE EXCESS BLDA CALC-SCNC: 8.3 MMOL/L — SIGNIFICANT CHANGE UP
BASOPHILS # BLD AUTO: 0.02 K/UL — SIGNIFICANT CHANGE UP (ref 0–0.2)
BASOPHILS # BLD AUTO: 0.04 K/UL — SIGNIFICANT CHANGE UP (ref 0–0.2)
BASOPHILS NFR BLD AUTO: 0.1 % — SIGNIFICANT CHANGE UP (ref 0–2)
BASOPHILS NFR BLD AUTO: 0.1 % — SIGNIFICANT CHANGE UP (ref 0–2)
BILIRUB SERPL-MCNC: 0.3 MG/DL — SIGNIFICANT CHANGE UP (ref 0.2–1.2)
BILIRUB SERPL-MCNC: 0.3 MG/DL — SIGNIFICANT CHANGE UP (ref 0.2–1.2)
BILIRUB SERPL-MCNC: < 0.2 MG/DL — LOW (ref 0.2–1.2)
BUN SERPL-MCNC: 24 MG/DL — HIGH (ref 7–23)
BUN SERPL-MCNC: 25 MG/DL — HIGH (ref 7–23)
BUN SERPL-MCNC: 26 MG/DL — HIGH (ref 7–23)
CALCIUM SERPL-MCNC: 8.7 MG/DL — SIGNIFICANT CHANGE UP (ref 8.4–10.5)
CALCIUM SERPL-MCNC: 8.8 MG/DL — SIGNIFICANT CHANGE UP (ref 8.4–10.5)
CALCIUM SERPL-MCNC: 9.1 MG/DL — SIGNIFICANT CHANGE UP (ref 8.4–10.5)
CHLORIDE BLDA-SCNC: 101 MMOL/L — SIGNIFICANT CHANGE UP (ref 96–108)
CHLORIDE SERPL-SCNC: 100 MMOL/L — SIGNIFICANT CHANGE UP (ref 98–107)
CHLORIDE SERPL-SCNC: 95 MMOL/L — LOW (ref 98–107)
CHLORIDE SERPL-SCNC: 99 MMOL/L — SIGNIFICANT CHANGE UP (ref 98–107)
CO2 SERPL-SCNC: 31 MMOL/L — SIGNIFICANT CHANGE UP (ref 22–31)
CO2 SERPL-SCNC: 31 MMOL/L — SIGNIFICANT CHANGE UP (ref 22–31)
CO2 SERPL-SCNC: 36 MMOL/L — HIGH (ref 22–31)
CREAT SERPL-MCNC: 0.7 MG/DL — SIGNIFICANT CHANGE UP (ref 0.5–1.3)
CREAT SERPL-MCNC: 0.78 MG/DL — SIGNIFICANT CHANGE UP (ref 0.5–1.3)
CREAT SERPL-MCNC: 0.86 MG/DL — SIGNIFICANT CHANGE UP (ref 0.5–1.3)
EOSINOPHIL # BLD AUTO: 0 K/UL — SIGNIFICANT CHANGE UP (ref 0–0.5)
EOSINOPHIL # BLD AUTO: 0.01 K/UL — SIGNIFICANT CHANGE UP (ref 0–0.5)
EOSINOPHIL NFR BLD AUTO: 0 % — SIGNIFICANT CHANGE UP (ref 0–6)
EOSINOPHIL NFR BLD AUTO: 0 % — SIGNIFICANT CHANGE UP (ref 0–6)
GLUCOSE BLDA-MCNC: 249 MG/DL — HIGH (ref 70–99)
GLUCOSE SERPL-MCNC: 224 MG/DL — HIGH (ref 70–99)
GLUCOSE SERPL-MCNC: 232 MG/DL — HIGH (ref 70–99)
GLUCOSE SERPL-MCNC: 263 MG/DL — HIGH (ref 70–99)
GRAM STN SPT: SIGNIFICANT CHANGE UP
HBA1C BLD-MCNC: 5.4 % — SIGNIFICANT CHANGE UP (ref 4–5.6)
HCO3 BLDA-SCNC: 30 MMOL/L — HIGH (ref 22–26)
HCO3 BLDA-SCNC: 31 MMOL/L — HIGH (ref 22–26)
HCO3 BLDA-SCNC: 39 MMOL/L — HIGH (ref 22–26)
HCT VFR BLD CALC: 30.7 % — LOW (ref 39–50)
HCT VFR BLD CALC: 33.3 % — LOW (ref 39–50)
HCT VFR BLDA CALC: 30.4 % — LOW (ref 39–51)
HGB BLD-MCNC: 9.4 G/DL — LOW (ref 13–17)
HGB BLD-MCNC: 9.9 G/DL — LOW (ref 13–17)
HGB BLDA-MCNC: 9.8 G/DL — LOW (ref 13–17)
IMM GRANULOCYTES NFR BLD AUTO: 1 % — SIGNIFICANT CHANGE UP (ref 0–1.5)
IMM GRANULOCYTES NFR BLD AUTO: 2.2 % — HIGH (ref 0–1.5)
INR BLD: 1.17 — SIGNIFICANT CHANGE UP (ref 0.88–1.17)
LACTATE BLDA-SCNC: 2.7 MMOL/L — HIGH (ref 0.5–2)
LYMPHOCYTES # BLD AUTO: 0.76 K/UL — LOW (ref 1–3.3)
LYMPHOCYTES # BLD AUTO: 13.6 % — SIGNIFICANT CHANGE UP (ref 13–44)
LYMPHOCYTES # BLD AUTO: 3.3 % — LOW (ref 13–44)
LYMPHOCYTES # BLD AUTO: 4.58 K/UL — HIGH (ref 1–3.3)
MAGNESIUM SERPL-MCNC: 2 MG/DL — SIGNIFICANT CHANGE UP (ref 1.6–2.6)
MAGNESIUM SERPL-MCNC: 2.1 MG/DL — SIGNIFICANT CHANGE UP (ref 1.6–2.6)
MAGNESIUM SERPL-MCNC: 2.1 MG/DL — SIGNIFICANT CHANGE UP (ref 1.6–2.6)
MANUAL SMEAR VERIFICATION: SIGNIFICANT CHANGE UP
MCHC RBC-ENTMCNC: 26.9 PG — LOW (ref 27–34)
MCHC RBC-ENTMCNC: 27.5 PG — SIGNIFICANT CHANGE UP (ref 27–34)
MCHC RBC-ENTMCNC: 29.7 % — LOW (ref 32–36)
MCHC RBC-ENTMCNC: 30.6 % — LOW (ref 32–36)
MCV RBC AUTO: 88 FL — SIGNIFICANT CHANGE UP (ref 80–100)
MCV RBC AUTO: 92.5 FL — SIGNIFICANT CHANGE UP (ref 80–100)
METHOD TYPE: SIGNIFICANT CHANGE UP
MONOCYTES # BLD AUTO: 1 K/UL — HIGH (ref 0–0.9)
MONOCYTES # BLD AUTO: 2.23 K/UL — HIGH (ref 0–0.9)
MONOCYTES NFR BLD AUTO: 4.4 % — SIGNIFICANT CHANGE UP (ref 2–14)
MONOCYTES NFR BLD AUTO: 6.6 % — SIGNIFICANT CHANGE UP (ref 2–14)
NEUTROPHILS # BLD AUTO: 20.8 K/UL — HIGH (ref 1.8–7.4)
NEUTROPHILS # BLD AUTO: 26.07 K/UL — HIGH (ref 1.8–7.4)
NEUTROPHILS NFR BLD AUTO: 77.5 % — HIGH (ref 43–77)
NEUTROPHILS NFR BLD AUTO: 91.2 % — HIGH (ref 43–77)
NRBC # FLD: 0 K/UL — SIGNIFICANT CHANGE UP (ref 0–0)
NRBC # FLD: 0 K/UL — SIGNIFICANT CHANGE UP (ref 0–0)
ORGANISM # SPEC MICROSCOPIC CNT: SIGNIFICANT CHANGE UP
ORGANISM # SPEC MICROSCOPIC CNT: SIGNIFICANT CHANGE UP
PCO2 BLDA: 51 MMHG — HIGH (ref 35–48)
PCO2 BLDA: 65 MMHG — HIGH (ref 35–48)
PCO2 BLDA: 78 MMHG — CRITICAL HIGH (ref 35–48)
PCO2 BLDA: 83 MMHG — CRITICAL HIGH (ref 35–48)
PH BLDA: 7.25 PH — LOW (ref 7.35–7.45)
PH BLDA: 7.27 PH — LOW (ref 7.35–7.45)
PH BLDA: 7.38 PH — SIGNIFICANT CHANGE UP (ref 7.35–7.45)
PH BLDA: 7.5 PH — HIGH (ref 7.35–7.45)
PHOSPHATE SERPL-MCNC: 1.5 MG/DL — LOW (ref 2.5–4.5)
PHOSPHATE SERPL-MCNC: 3.1 MG/DL — SIGNIFICANT CHANGE UP (ref 2.5–4.5)
PHOSPHATE SERPL-MCNC: 3.3 MG/DL — SIGNIFICANT CHANGE UP (ref 2.5–4.5)
PLATELET # BLD AUTO: 122 K/UL — LOW (ref 150–400)
PLATELET # BLD AUTO: 123 K/UL — LOW (ref 150–400)
PMV BLD: 13.3 FL — HIGH (ref 7–13)
PMV BLD: 13.7 FL — HIGH (ref 7–13)
PO2 BLDA: 154 MMHG — HIGH (ref 83–108)
PO2 BLDA: 154 MMHG — HIGH (ref 83–108)
PO2 BLDA: 288 MMHG — HIGH (ref 83–108)
PO2 BLDA: 378 MMHG — HIGH (ref 83–108)
POTASSIUM BLDA-SCNC: 3.9 MMOL/L — SIGNIFICANT CHANGE UP (ref 3.4–4.5)
POTASSIUM SERPL-MCNC: 4.2 MMOL/L — SIGNIFICANT CHANGE UP (ref 3.5–5.3)
POTASSIUM SERPL-MCNC: 4.3 MMOL/L — SIGNIFICANT CHANGE UP (ref 3.5–5.3)
POTASSIUM SERPL-MCNC: 4.3 MMOL/L — SIGNIFICANT CHANGE UP (ref 3.5–5.3)
POTASSIUM SERPL-SCNC: 4.2 MMOL/L — SIGNIFICANT CHANGE UP (ref 3.5–5.3)
POTASSIUM SERPL-SCNC: 4.3 MMOL/L — SIGNIFICANT CHANGE UP (ref 3.5–5.3)
POTASSIUM SERPL-SCNC: 4.3 MMOL/L — SIGNIFICANT CHANGE UP (ref 3.5–5.3)
PROT SERPL-MCNC: 6 G/DL — SIGNIFICANT CHANGE UP (ref 6–8.3)
PROT SERPL-MCNC: 6.1 G/DL — SIGNIFICANT CHANGE UP (ref 6–8.3)
PROT SERPL-MCNC: 6.1 G/DL — SIGNIFICANT CHANGE UP (ref 6–8.3)
PROTHROM AB SERPL-ACNC: 13.1 SEC — SIGNIFICANT CHANGE UP (ref 9.8–13.1)
RBC # BLD: 3.49 M/UL — LOW (ref 4.2–5.8)
RBC # BLD: 3.6 M/UL — LOW (ref 4.2–5.8)
RBC # FLD: 15 % — HIGH (ref 10.3–14.5)
RBC # FLD: 15.7 % — HIGH (ref 10.3–14.5)
SAO2 % BLDA: 98.8 % — SIGNIFICANT CHANGE UP (ref 95–99)
SAO2 % BLDA: 99.5 % — HIGH (ref 95–99)
SAO2 % BLDA: 99.5 % — HIGH (ref 95–99)
SODIUM BLDA-SCNC: 137 MMOL/L — SIGNIFICANT CHANGE UP (ref 136–146)
SODIUM SERPL-SCNC: 139 MMOL/L — SIGNIFICANT CHANGE UP (ref 135–145)
SODIUM SERPL-SCNC: 140 MMOL/L — SIGNIFICANT CHANGE UP (ref 135–145)
SODIUM SERPL-SCNC: 141 MMOL/L — SIGNIFICANT CHANGE UP (ref 135–145)
VANCOMYCIN TROUGH SERPL-MCNC: 10.9 UG/ML — SIGNIFICANT CHANGE UP (ref 10–20)
WBC # BLD: 22.8 K/UL — HIGH (ref 3.8–10.5)
WBC # BLD: 33.66 K/UL — HIGH (ref 3.8–10.5)
WBC # FLD AUTO: 22.8 K/UL — HIGH (ref 3.8–10.5)
WBC # FLD AUTO: 33.66 K/UL — HIGH (ref 3.8–10.5)

## 2019-07-05 PROCEDURE — 92950 HEART/LUNG RESUSCITATION CPR: CPT | Mod: GC

## 2019-07-05 PROCEDURE — 99232 SBSQ HOSP IP/OBS MODERATE 35: CPT

## 2019-07-05 PROCEDURE — 99291 CRITICAL CARE FIRST HOUR: CPT | Mod: 25

## 2019-07-05 PROCEDURE — 71045 X-RAY EXAM CHEST 1 VIEW: CPT | Mod: 26

## 2019-07-05 PROCEDURE — 31622 DX BRONCHOSCOPE/WASH: CPT

## 2019-07-05 RX ORDER — FENTANYL CITRATE 50 UG/ML
0.5 INJECTION INTRAVENOUS
Qty: 2500 | Refills: 0 | Status: DISCONTINUED | OUTPATIENT
Start: 2019-07-05 | End: 2019-07-07

## 2019-07-05 RX ORDER — POTASSIUM PHOSPHATE, MONOBASIC POTASSIUM PHOSPHATE, DIBASIC 236; 224 MG/ML; MG/ML
15 INJECTION, SOLUTION INTRAVENOUS ONCE
Refills: 0 | Status: COMPLETED | OUTPATIENT
Start: 2019-07-05 | End: 2019-07-05

## 2019-07-05 RX ORDER — ACETAZOLAMIDE 250 MG/1
500 TABLET ORAL ONCE
Refills: 0 | Status: COMPLETED | OUTPATIENT
Start: 2019-07-05 | End: 2019-07-05

## 2019-07-05 RX ORDER — CISATRACURIUM BESYLATE 2 MG/ML
20 INJECTION INTRAVENOUS ONCE
Refills: 0 | Status: COMPLETED | OUTPATIENT
Start: 2019-07-05 | End: 2019-07-05

## 2019-07-05 RX ORDER — MIDAZOLAM HYDROCHLORIDE 1 MG/ML
4 INJECTION, SOLUTION INTRAMUSCULAR; INTRAVENOUS ONCE
Refills: 0 | Status: DISCONTINUED | OUTPATIENT
Start: 2019-07-05 | End: 2019-07-05

## 2019-07-05 RX ORDER — MIDAZOLAM HYDROCHLORIDE 1 MG/ML
0.02 INJECTION, SOLUTION INTRAMUSCULAR; INTRAVENOUS
Qty: 100 | Refills: 0 | Status: DISCONTINUED | OUTPATIENT
Start: 2019-07-05 | End: 2019-07-07

## 2019-07-05 RX ORDER — SODIUM,POTASSIUM PHOSPHATES 278-250MG
1 POWDER IN PACKET (EA) ORAL EVERY 4 HOURS
Refills: 0 | Status: COMPLETED | OUTPATIENT
Start: 2019-07-05 | End: 2019-07-05

## 2019-07-05 RX ORDER — NOREPINEPHRINE BITARTRATE/D5W 8 MG/250ML
0.05 PLASTIC BAG, INJECTION (ML) INTRAVENOUS
Qty: 16 | Refills: 0 | Status: DISCONTINUED | OUTPATIENT
Start: 2019-07-05 | End: 2019-07-07

## 2019-07-05 RX ORDER — CISATRACURIUM BESYLATE 2 MG/ML
3 INJECTION INTRAVENOUS
Qty: 200 | Refills: 0 | Status: DISCONTINUED | OUTPATIENT
Start: 2019-07-05 | End: 2019-07-07

## 2019-07-05 RX ORDER — VANCOMYCIN HCL 1 G
1250 VIAL (EA) INTRAVENOUS EVERY 12 HOURS
Refills: 0 | Status: DISCONTINUED | OUTPATIENT
Start: 2019-07-05 | End: 2019-07-07

## 2019-07-05 RX ORDER — SODIUM,POTASSIUM PHOSPHATES 278-250MG
1 POWDER IN PACKET (EA) ORAL EVERY 4 HOURS
Refills: 0 | Status: DISCONTINUED | OUTPATIENT
Start: 2019-07-05 | End: 2019-07-05

## 2019-07-05 RX ORDER — IPRATROPIUM/ALBUTEROL SULFATE 18-103MCG
3 AEROSOL WITH ADAPTER (GRAM) INHALATION EVERY 6 HOURS
Refills: 0 | Status: DISCONTINUED | OUTPATIENT
Start: 2019-07-05 | End: 2019-07-07

## 2019-07-05 RX ORDER — ALTEPLASE 100 MG
50 KIT INTRAVENOUS ONCE
Refills: 0 | Status: DISCONTINUED | OUTPATIENT
Start: 2019-07-05 | End: 2019-07-06

## 2019-07-05 RX ADMIN — MIDAZOLAM HYDROCHLORIDE 4 MILLIGRAM(S): 1 INJECTION, SOLUTION INTRAMUSCULAR; INTRAVENOUS at 22:15

## 2019-07-05 RX ADMIN — HEPARIN SODIUM 5000 UNIT(S): 5000 INJECTION INTRAVENOUS; SUBCUTANEOUS at 05:38

## 2019-07-05 RX ADMIN — Medication 40 MILLIGRAM(S): at 17:43

## 2019-07-05 RX ADMIN — MEROPENEM 100 MILLIGRAM(S): 1 INJECTION INTRAVENOUS at 14:00

## 2019-07-05 RX ADMIN — CHLORHEXIDINE GLUCONATE 15 MILLILITER(S): 213 SOLUTION TOPICAL at 17:42

## 2019-07-05 RX ADMIN — CHLORHEXIDINE GLUCONATE 1 APPLICATION(S): 213 SOLUTION TOPICAL at 05:39

## 2019-07-05 RX ADMIN — HEPARIN SODIUM 5000 UNIT(S): 5000 INJECTION INTRAVENOUS; SUBCUTANEOUS at 17:42

## 2019-07-05 RX ADMIN — ACETAZOLAMIDE 500 MILLIGRAM(S): 250 TABLET ORAL at 13:26

## 2019-07-05 RX ADMIN — MIDAZOLAM HYDROCHLORIDE 4 MILLIGRAM(S): 1 INJECTION, SOLUTION INTRAMUSCULAR; INTRAVENOUS at 21:45

## 2019-07-05 RX ADMIN — Medication 1 PACKET(S): at 13:25

## 2019-07-05 RX ADMIN — Medication 40 MILLIGRAM(S): at 05:38

## 2019-07-05 RX ADMIN — CHLORHEXIDINE GLUCONATE 15 MILLILITER(S): 213 SOLUTION TOPICAL at 05:38

## 2019-07-05 RX ADMIN — MEROPENEM 100 MILLIGRAM(S): 1 INJECTION INTRAVENOUS at 05:38

## 2019-07-05 RX ADMIN — MIDAZOLAM HYDROCHLORIDE 1.67 MG/KG/HR: 1 INJECTION, SOLUTION INTRAMUSCULAR; INTRAVENOUS at 22:16

## 2019-07-05 RX ADMIN — CISATRACURIUM BESYLATE 20 MILLIGRAM(S): 2 INJECTION INTRAVENOUS at 22:30

## 2019-07-05 RX ADMIN — MEROPENEM 100 MILLIGRAM(S): 1 INJECTION INTRAVENOUS at 22:16

## 2019-07-05 RX ADMIN — Medication 166.67 MILLIGRAM(S): at 12:09

## 2019-07-05 RX ADMIN — PHENYLEPHRINE HYDROCHLORIDE 15.64 MICROGRAM(S)/KG/MIN: 10 INJECTION INTRAVENOUS at 20:00

## 2019-07-05 RX ADMIN — Medication 1 PACKET(S): at 12:09

## 2019-07-05 RX ADMIN — PANTOPRAZOLE SODIUM 40 MILLIGRAM(S): 20 TABLET, DELAYED RELEASE ORAL at 13:26

## 2019-07-05 RX ADMIN — POTASSIUM PHOSPHATE, MONOBASIC POTASSIUM PHOSPHATE, DIBASIC 62.5 MILLIMOLE(S): 236; 224 INJECTION, SOLUTION INTRAVENOUS at 06:10

## 2019-07-05 RX ADMIN — PROPOFOL 20.02 MICROGRAM(S)/KG/MIN: 10 INJECTION, EMULSION INTRAVENOUS at 20:00

## 2019-07-05 NOTE — CHART NOTE - NSCHARTNOTEFT_GEN_A_CORE
Code Note    Code called approximately 815PM  Per CTI staff, patient had rapid progressive hypoxia before arrest. CPR began by CTICU nurses and attending Dr Roth on MICU team arrival.   Suctioning revealed more thick green secretions. After several minutes (see code record for timing and drug details) patient achieved ROSC.     Bedside ultrasound very limited due to bullous disease, however consistent with known DAVID bulla. No clear new pneumothorax.  With suctioning SpO2 improved though continues to require high vent pressures.   Severe underlying lung disease now with pseudomonas pneumonia and very high burden of purulent sputum.    After ROSC, emergent bronchoscopy was performed - see additional note.    Plan to continue meropenem, increase sedation, vasopressors as needed, follow up repeat xray  Start metanebs

## 2019-07-05 NOTE — PROGRESS NOTE ADULT - ASSESSMENT
48 year old with history of sarcoidosis presents with reecent episode of hemoptysis from stoma    Tx for pneumonia at outside hospital    Colinzed with candida auris    Critically ill with new hypercapneic respiratory failure    1) Respiratory failure  new pneumonia  Sputum growing Pseudomonas  Resitnant to zosyn    Continue meropenem  Can stop vanco      2) C auris colonization  Contact isolation    3) Leukocytosis  Multifactorial- infection / steroids  Continue to monitor    Contact precautions

## 2019-07-05 NOTE — PROGRESS NOTE ADULT - SUBJECTIVE AND OBJECTIVE BOX
Follow Up:      Inverval History/ROS:Patient is a 48y old  Male who presents with a chief complaint of Bleeding from tracheal stoma (05 Jul 2019 07:12)    Last Fever on 7/2  Intubated  On pressors      Allergies    No Known Allergies    Intolerances        ANTIMICROBIALS:  meropenem  IVPB 1000 every 8 hours  vancomycin  IVPB 1250 every 12 hours      OTHER MEDS:  ALBUTerol/ipratropium for Nebulization 3 milliLiter(s) Nebulizer every 6 hours  bisacodyl Suppository 10 milliGRAM(s) Rectal daily PRN  chlorhexidine 4% Liquid 1 Application(s) Topical <User Schedule>  dexmedetomidine Infusion 0.7 MICROgram(s)/kG/Hr IV Continuous <Continuous>  heparin  Injectable 5000 Unit(s) SubCutaneous every 12 hours  methylPREDNISolone sodium succinate Injectable 40 milliGRAM(s) IV Push every 12 hours  pantoprazole  Injectable 40 milliGRAM(s) IV Push daily  phenylephrine    Infusion 0.5 MICROgram(s)/kG/Min IV Continuous <Continuous>  propofol Infusion 40 MICROgram(s)/kG/Min IV Continuous <Continuous>  sodium chloride 0.9% lock flush 10 milliLiter(s) IV Push every 1 hour PRN      Vital Signs Last 24 Hrs  T(C): 36.7 (05 Jul 2019 08:00), Max: 37.1 (05 Jul 2019 00:00)  T(F): 98 (05 Jul 2019 08:00), Max: 98.7 (05 Jul 2019 00:00)  HR: 68 (05 Jul 2019 11:34) (64 - 78)  BP: --  BP(mean): --  RR: 10 (05 Jul 2019 09:00) (6 - 15)  SpO2: 100% (05 Jul 2019 11:34) (100% - 100%)    PHYSICAL EXAM:  General: [x ]intubated  HEAD/EYES: [ ] PERRL [x ] white sclera [ ] icterus  ENT:  [ ] normal [x ] supple [ ] thrush [ ] pharyngeal exudate  Cardiovascular:   [ ] murmur [x ] normal [ ] PPM/AICD  Respiratory:  [x ] course BS  GI:  [x ] soft, non-tender, normal bowel sounds  :  [ ] mock [x ] no CVA tenderness   Musculoskeletal:  [x ] no synovitis  Neurologic:  [x ] non-focal exam   Skin:  [x ] no rash  Lymph: [ ] no lymphadenopathy  Psychiatric:  [ ] appropriate affect [ ] alert & oriented  Lines:  [ x] no phlebitis [ ] central line                                9.4    22.80 )-----------( 123      ( 05 Jul 2019 03:00 )             30.7       07-05    139  |  95<L>  |  26<H>  ----------------------------<  232<H>  4.3   |  36<H>  |  0.70    Ca    9.1      05 Jul 2019 03:00  Phos  1.5     07-05  Mg     2.0     07-05    TPro  6.0  /  Alb  3.0<L>  /  TBili  < 0.2<L>  /  DBili  x   /  AST  7   /  ALT  17  /  AlkPhos  51  07-05          MICROBIOLOGY:Culture - Respiratory:   Normal Respiratory Katherine Also Present  PSA^Pseudomonas aeruginosa  QUANTITY OF GROWTH: RARE (07-02-19 @ 12:24)  Culture - Respiratory:   QUANTITY OF GROWTH: RARE (07-02-19 @ 12:24)      RADIOLOGY:

## 2019-07-05 NOTE — PROGRESS NOTE ADULT - ASSESSMENT
ASSESSMENT AND PLAN:  48-yo M w/ PMH of sarcoidosis, pulmonary fibrosis, aspergilloma s/p DAVID lobectomy, transferred to [facility] CTICU from [facility] for trach stoma bleeding, c/b Candida auris infection, tranferred to MICU for further management.    #Neuro:  - Intubated  - Alert and awake    #CV:  - On Eldon 0.3. /74 this AM.  - Titrate as needed  - Central line inserted (L IJ)    #Pulm:  - Hypoxic respiratory failure requiring intubation (7/2)  - Failed CPAP trial.  - Will continue on mechanical ventilation at this time. Plan to perform CPAP trial    #ID:  - Candida auris infection  - BCx NGTD 6/30, 7/2.   - Bronch Cx with GPC and GNR  - On cici/Vanc  - ID on board. Appreciate recs.  - Will broaden antibiotics for fungal coverage if patient deteriorates.    #Heme:  - Hgb appears at baseline ~10  - Continue to monitor at this time  - Monitor for bleeding and H/H    #Endo:  - FS ~150s  - A1C 5.4  - FS Q6H with tube feeds    #GI:  - NPO with tube feeds    #Renal:  - No acute issues identified    #GOC:  - Full Code  - Contact precaution (Candida auris)    #DVT PPx:  - HSQ. ASSESSMENT AND PLAN:  48-yo M w/ PMH of sarcoidosis, pulmonary fibrosis, aspergilloma s/p DAVID lobectomy, transferred to [facility] CTICU from [facility] for trach stoma bleeding, c/b Candida auris skin colonization, tranferred to MICU for further management.    #Neuro:  - Intubated, mildly sedated  - Alert and awake    #CV:  - On Eldon 0.3. /74 this AM.  - Titrate as needed  - Central line inserted (L IJ)    #Pulm:  - Hypoxic respiratory failure requiring intubation (7/2)  - Failed CPAP trial.  - Will try Diamox for high bicarb. Patient's bicarb was 30s at baseline. Currently with metabolic alkalosis.      #ID:  - Candida auris infection  - BCx NGTD 6/30, 7/2.   - Bronch Cx with GPC and GNR  - On cici/Vanc  - ID on board. Appreciate recs.  - Will broaden antibiotics for fungal coverage if patient deteriorates.  - Awaiting sensitivity    #Heme:  - Hgb appears at baseline ~10  - Continue to monitor at this time  - Monitor for bleeding and H/H    #Endo:  - FS ~150s  - A1C 5.4  - FS Q6H with tube feeds    #Rheum:  - Patient is on Solumedrol 40 mg IV Q8H  - Will titrate to 40 mg IV BID today. Will continue to titrate down    #GI:  - NPO with tube feeds    #Renal:  - No acute issues identified    #GOC:  - Full Code  - Contact precaution (Candida auris)    #DVT PPx:  - HSQ.

## 2019-07-05 NOTE — CHART NOTE - NSCHARTNOTEFT_GEN_A_CORE
Pt had PEA arrest x 3 over a 45 min period.  Did not appear to be preceeded by hypoxia as did prior CA; however, thick secretions were suctioned.  In total, he received 3-4 epi's and 3 amps bicarb and total of about 15 min chest compressions.  Afterwards, pt awake and alert.  Resedated.  Blood work done just prior to these events revealed mild resp acidosis (pH 7.27) and no severe metabolic derangement.  He had been well sedated and just started on Nimbex to aid in vent coordination  POCUS had poor windows especially for eval of rt side of heart, but subcostal view showed enlarged RV with thickened walls.  Review of TTE 2017 - described similar findings.  I do not feel that this was 2/2 PE.  Min pericardial effusion without tamponade physiology, IVC plump  Hgb in the 9's and stable    Pt remained post code on norepi 0.8mcg/k/min  and phenylephrine 1.8mcg/k/min.  Sedated with midazolam, propofol and precedex, nimbex started for vent coordination after pt well sedated    Pt's wife kept abreast of his condition.  She remains at bedside  CC time spent at bedside exclusive of procedures and POCUS 45 min  Pt remains very guarded

## 2019-07-05 NOTE — CHART NOTE - NSCHARTNOTEFT_GEN_A_CORE
Indication:  Respiratory failure  Operators:   Avery Castano    History:  Patient had hypoxic cardiac arrest       Findings:  Bronchoscope inserted through ETT. ETT noted to be in good position. ETT was filled with thick green secretions. The airways (right much more than left) were filled with thick green secretions which were suctioned. Patient had 100% SpO2 during the procedure and tolerated it without incident.   No endobronchial lesions or blood were seen.     Specimens:  BAL for culture.         Pre-Bronchoscopy Tuberculosis Risk Screening Tool  To reduce the risk for airborne transmission of Mycobacterium tuberculosis, this assessment form must be completed prior to bronchoscopy procedures being performed outside of a negative pressure environment.    Procedure Date: 7/5/19  Reason for the Bronchoscopy: resp failure  Planned Location for the Procedure:  ?[ ] Emergency Department ?[X ] Intensive Care Unit ? [ ] Operating Room ? Other: ___________________    Risk Assessment  I. I have personally evaluated this patient for Mycobacterium tuberculosis and I determined thefollowing risk:  ?[XS ] No Risk for TB     [ ] Low risk or TB     [ ] Significant risk for TB    II. Additional Findings: _________________________    III. Based on the Determined Risk for TB the following Action(s) are Suggested:  1. If there are no risk factors for TB infection proceed with the procedure.  2. If there is low risk or significant risk for TB infection the following recommendations should be followed:            a. Perform the procedure in a negative pressure room, with N95 respirator.            b. If not feasible to move the patient or defer the procedure:                    i. Use a single-bedded room low traffic area to perform the bronchoscopy procedure.                   ii. Place a portable high-efficiency particulate air (HEPA) filter in the space prior tostarting the procedure and keep closed.                       Refer to the INF.1132 titled “Tuberculosis Control Strategy Plan” for additional information.                  iii. All Health Care Provider within the procedure room shall wear an N95 respirator.            c. Documentation of the tuberculosis risk assessment should be included within the patient’s medical record.

## 2019-07-05 NOTE — PROGRESS NOTE ADULT - SUBJECTIVE AND OBJECTIVE BOX
Peyman Aiken MD, PhD | PGY-2  Department of Internal Medicine  Pager [phone] ([facility]) / [phone] ([facility])      INTERVAL HPI/OVERNIGHT EVENTS:    SUBJECTIVE: R brachial central line placed yesterday. Patient was hypotensive at 50s-60s/40s last night despite Levo gtt. CXR did not reveal correct placement of central line. Central line was pulled out and LIJ line was placed. No further hypotensive event overnight. Patient was stable at 100s-120s/60s-70s of arterial pressure ON. Patient seen and examined at bedside. Awake and alert. Intubated. Prop 40, Precedex 0.6, Eldon 0.3. Denies fever, chills, or chest pain. Other ROS unable to obtain due to difficulty communicating due to intubation.    OBJECTIVE:    VITAL SIGNS:  ICU Vital Signs Last 24 Hrs  T(C): 36.6 (05 Jul 2019 04:00), Max: 37.1 (04 Jul 2019 08:00)  T(F): 97.8 (05 Jul 2019 04:00), Max: 98.8 (04 Jul 2019 08:00)  HR: 70 (05 Jul 2019 07:00) (65 - 78)  BP: 106/78 (04 Jul 2019 14:00) (90/64 - 106/78)  BP(mean): 84 (04 Jul 2019 14:00) (70 - 84)  ABP: 93/64 (05 Jul 2019 07:00) (93/59 - 126/82)  ABP(mean): 76 (05 Jul 2019 07:00) (69 - 96)  RR: 10 (05 Jul 2019 07:00) (6 - 15)  SpO2: 100% (05 Jul 2019 07:00) (100% - 100%)    Mode: AC/ CMV (Assist Control/ Continuous Mandatory Ventilation), RR (machine): 12, TV (machine): 380, FiO2: 40, PEEP: 5, MAP: 17, PIP: 45    07-04 @ 07:01  -  07-05 @ 07:00  --------------------------------------------------------  IN: 2556.7 mL / OUT: 1360 mL / NET: 1196.7 mL      CAPILLARY BLOOD GLUCOSE          PHYSICAL EXAM:    General: NAD  HEENT: NC/AT; PERRL, clear conjunctiva  Neck: supple  Respiratory: CTA b/l  Cardiovascular: +S1/S2; RRR  Abdomen: soft, NT/ND; +BS x4  Extremities: WWP, 2+ peripheral pulses b/l; no LE edema  Skin: normal color and turgor; no rash  Neurological:    MEDICATIONS:  MEDICATIONS  (STANDING):  chlorhexidine 4% Liquid 1 Application(s) Topical <User Schedule>  dexmedetomidine Infusion 0.7 MICROgram(s)/kG/Hr (14.595 mL/Hr) IV Continuous <Continuous>  heparin  Injectable 5000 Unit(s) SubCutaneous every 12 hours  meropenem  IVPB 1000 milliGRAM(s) IV Intermittent every 8 hours  methylPREDNISolone sodium succinate Injectable 40 milliGRAM(s) IV Push every 8 hours  pantoprazole  Injectable 40 milliGRAM(s) IV Push daily  phenylephrine    Infusion 0.5 MICROgram(s)/kG/Min (15.637 mL/Hr) IV Continuous <Continuous>  potassium phosphate / sodium phosphate powder 1 Packet(s) Oral every 4 hours  potassium phosphate IVPB 15 milliMole(s) IV Intermittent once  propofol Infusion 40 MICROgram(s)/kG/Min (20.016 mL/Hr) IV Continuous <Continuous>  vancomycin  IVPB 1000 milliGRAM(s) IV Intermittent every 12 hours    MEDICATIONS  (PRN):  bisacodyl Suppository 10 milliGRAM(s) Rectal daily PRN Constipation  sodium chloride 0.9% lock flush 10 milliLiter(s) IV Push every 1 hour PRN Pre/post blood products, medications, blood draw, and to maintain line patency      ALLERGIES:  Allergies    No Known Allergies    Intolerances        LABS:                        9.4    22.80 )-----------( 123      ( 05 Jul 2019 03:00 )             30.7     07-05    139  |  95<L>  |  26<H>  ----------------------------<  232<H>  4.3   |  36<H>  |  0.70    Ca    9.1      05 Jul 2019 03:00  Phos  1.5     07-05  Mg     2.0     07-05    TPro  6.0  /  Alb  3.0<L>  /  TBili  < 0.2<L>  /  DBili  x   /  AST  7   /  ALT  17  /  AlkPhos  51  07-05          RADIOLOGY & ADDITIONAL TESTS: Reviewed. Peyman Aiken MD, PhD | PGY-2  Department of Internal Medicine  Pager [phone] ([facility]) / [phone] ([facility])      INTERVAL HPI/OVERNIGHT EVENTS:    SUBJECTIVE: R brachial central line placed yesterday. Patient was hypotensive at 50s-60s/40s last night despite Levo gtt. CXR did not reveal correct placement of central line. Central line was pulled out and LIJ line was placed. No further hypotensive event overnight. Patient was stable at 100s-120s/60s-70s of arterial pressure ON. Patient seen and examined at bedside. Awake and alert. Intubated. Prop 40, Precedex 0.6, Eldon 0.3. Denies fever, chills, or chest pain. Other ROS unable to obtain due to difficulty communicating due to intubation.    OBJECTIVE:    VITAL SIGNS:  ICU Vital Signs Last 24 Hrs  T(C): 36.6 (05 Jul 2019 04:00), Max: 37.1 (04 Jul 2019 08:00)  T(F): 97.8 (05 Jul 2019 04:00), Max: 98.8 (04 Jul 2019 08:00)  HR: 70 (05 Jul 2019 07:00) (65 - 78)  BP: 106/78 (04 Jul 2019 14:00) (90/64 - 106/78)  BP(mean): 84 (04 Jul 2019 14:00) (70 - 84)  ABP: 93/64 (05 Jul 2019 07:00) (93/59 - 126/82)  ABP(mean): 76 (05 Jul 2019 07:00) (69 - 96)  RR: 10 (05 Jul 2019 07:00) (6 - 15)  SpO2: 100% (05 Jul 2019 07:00) (100% - 100%)    Mode: AC/ CMV (Assist Control/ Continuous Mandatory Ventilation), RR (machine): 12, TV (machine): 380, FiO2: 40, PEEP: 5, MAP: 17, PIP: 45    07-04 @ 07:01  -  07-05 @ 07:00  --------------------------------------------------------  IN: 2556.7 mL / OUT: 1360 mL / NET: 1196.7 mL      CAPILLARY BLOOD GLUCOSE          PHYSICAL EXAM:    General: NAD  HEENT: NC/AT; PERRL, clear conjunctiva  Neck: supple  Respiratory: CTA b/l  Cardiovascular: +S1/S2; RRR  Abdomen: soft, NT/ND; +BS x4  Extremities: WWP, 2+ peripheral pulses b/l; no LE edema  Skin: normal color and turgor; groin skin excoriation  Neurological:    MEDICATIONS:  MEDICATIONS  (STANDING):  chlorhexidine 4% Liquid 1 Application(s) Topical <User Schedule>  dexmedetomidine Infusion 0.7 MICROgram(s)/kG/Hr (14.595 mL/Hr) IV Continuous <Continuous>  heparin  Injectable 5000 Unit(s) SubCutaneous every 12 hours  meropenem  IVPB 1000 milliGRAM(s) IV Intermittent every 8 hours  methylPREDNISolone sodium succinate Injectable 40 milliGRAM(s) IV Push every 8 hours  pantoprazole  Injectable 40 milliGRAM(s) IV Push daily  phenylephrine    Infusion 0.5 MICROgram(s)/kG/Min (15.637 mL/Hr) IV Continuous <Continuous>  potassium phosphate / sodium phosphate powder 1 Packet(s) Oral every 4 hours  potassium phosphate IVPB 15 milliMole(s) IV Intermittent once  propofol Infusion 40 MICROgram(s)/kG/Min (20.016 mL/Hr) IV Continuous <Continuous>  vancomycin  IVPB 1000 milliGRAM(s) IV Intermittent every 12 hours    MEDICATIONS  (PRN):  bisacodyl Suppository 10 milliGRAM(s) Rectal daily PRN Constipation  sodium chloride 0.9% lock flush 10 milliLiter(s) IV Push every 1 hour PRN Pre/post blood products, medications, blood draw, and to maintain line patency      ALLERGIES:  Allergies    No Known Allergies    Intolerances        LABS:                        9.4    22.80 )-----------( 123      ( 05 Jul 2019 03:00 )             30.7     07-05    139  |  95<L>  |  26<H>  ----------------------------<  232<H>  4.3   |  36<H>  |  0.70    Ca    9.1      05 Jul 2019 03:00  Phos  1.5     07-05  Mg     2.0     07-05    TPro  6.0  /  Alb  3.0<L>  /  TBili  < 0.2<L>  /  DBili  x   /  AST  7   /  ALT  17  /  AlkPhos  51  07-05          RADIOLOGY & ADDITIONAL TESTS: Reviewed.

## 2019-07-06 LAB
ALBUMIN SERPL ELPH-MCNC: 2.8 G/DL — LOW (ref 3.3–5)
ALBUMIN SERPL ELPH-MCNC: 2.9 G/DL — LOW (ref 3.3–5)
ALBUMIN SERPL ELPH-MCNC: 3 G/DL — LOW (ref 3.3–5)
ALP SERPL-CCNC: 71 U/L — SIGNIFICANT CHANGE UP (ref 40–120)
ALP SERPL-CCNC: 75 U/L — SIGNIFICANT CHANGE UP (ref 40–120)
ALP SERPL-CCNC: 79 U/L — SIGNIFICANT CHANGE UP (ref 40–120)
ALT FLD-CCNC: 73 U/L — HIGH (ref 4–41)
ALT FLD-CCNC: 81 U/L — HIGH (ref 4–41)
ALT FLD-CCNC: 87 U/L — HIGH (ref 4–41)
ANION GAP SERPL CALC-SCNC: 12 MMO/L — SIGNIFICANT CHANGE UP (ref 7–14)
ANION GAP SERPL CALC-SCNC: 12 MMO/L — SIGNIFICANT CHANGE UP (ref 7–14)
ANION GAP SERPL CALC-SCNC: 9 MMO/L — SIGNIFICANT CHANGE UP (ref 7–14)
APTT BLD: 23.8 SEC — LOW (ref 27.5–36.3)
AST SERPL-CCNC: 42 U/L — HIGH (ref 4–40)
AST SERPL-CCNC: 55 U/L — HIGH (ref 4–40)
AST SERPL-CCNC: 79 U/L — HIGH (ref 4–40)
BASE EXCESS BLDA CALC-SCNC: 10.3 MMOL/L — SIGNIFICANT CHANGE UP
BASE EXCESS BLDA CALC-SCNC: 10.3 MMOL/L — SIGNIFICANT CHANGE UP
BASE EXCESS BLDA CALC-SCNC: 11.3 MMOL/L — SIGNIFICANT CHANGE UP
BASE EXCESS BLDA CALC-SCNC: 11.5 MMOL/L — SIGNIFICANT CHANGE UP
BASE EXCESS BLDA CALC-SCNC: 11.9 MMOL/L — SIGNIFICANT CHANGE UP
BASOPHILS # BLD AUTO: 0.04 K/UL — SIGNIFICANT CHANGE UP (ref 0–0.2)
BASOPHILS # BLD AUTO: 0.04 K/UL — SIGNIFICANT CHANGE UP (ref 0–0.2)
BASOPHILS NFR BLD AUTO: 0.1 % — SIGNIFICANT CHANGE UP (ref 0–2)
BASOPHILS NFR BLD AUTO: 0.1 % — SIGNIFICANT CHANGE UP (ref 0–2)
BILIRUB SERPL-MCNC: 0.3 MG/DL — SIGNIFICANT CHANGE UP (ref 0.2–1.2)
BILIRUB SERPL-MCNC: 0.3 MG/DL — SIGNIFICANT CHANGE UP (ref 0.2–1.2)
BILIRUB SERPL-MCNC: 0.4 MG/DL — SIGNIFICANT CHANGE UP (ref 0.2–1.2)
BLOOD GAS ARTERIAL - FIO2: 50 — SIGNIFICANT CHANGE UP
BLOOD GAS ARTERIAL - FIO2: 50 — SIGNIFICANT CHANGE UP
BUN SERPL-MCNC: 24 MG/DL — HIGH (ref 7–23)
BUN SERPL-MCNC: 25 MG/DL — HIGH (ref 7–23)
BUN SERPL-MCNC: 25 MG/DL — HIGH (ref 7–23)
CA-I BLDA-SCNC: 1.22 MMOL/L — SIGNIFICANT CHANGE UP (ref 1.15–1.29)
CALCIUM SERPL-MCNC: 8.6 MG/DL — SIGNIFICANT CHANGE UP (ref 8.4–10.5)
CALCIUM SERPL-MCNC: 8.9 MG/DL — SIGNIFICANT CHANGE UP (ref 8.4–10.5)
CALCIUM SERPL-MCNC: 9 MG/DL — SIGNIFICANT CHANGE UP (ref 8.4–10.5)
CHLORIDE BLDA-SCNC: 106 MMOL/L — SIGNIFICANT CHANGE UP (ref 96–108)
CHLORIDE BLDA-SCNC: 106 MMOL/L — SIGNIFICANT CHANGE UP (ref 96–108)
CHLORIDE SERPL-SCNC: 100 MMOL/L — SIGNIFICANT CHANGE UP (ref 98–107)
CO2 SERPL-SCNC: 30 MMOL/L — SIGNIFICANT CHANGE UP (ref 22–31)
CO2 SERPL-SCNC: 33 MMOL/L — HIGH (ref 22–31)
CO2 SERPL-SCNC: 33 MMOL/L — HIGH (ref 22–31)
CREAT SERPL-MCNC: 0.78 MG/DL — SIGNIFICANT CHANGE UP (ref 0.5–1.3)
CREAT SERPL-MCNC: 0.78 MG/DL — SIGNIFICANT CHANGE UP (ref 0.5–1.3)
CREAT SERPL-MCNC: 0.86 MG/DL — SIGNIFICANT CHANGE UP (ref 0.5–1.3)
EOSINOPHIL # BLD AUTO: 0 K/UL — SIGNIFICANT CHANGE UP (ref 0–0.5)
EOSINOPHIL # BLD AUTO: 0 K/UL — SIGNIFICANT CHANGE UP (ref 0–0.5)
EOSINOPHIL NFR BLD AUTO: 0 % — SIGNIFICANT CHANGE UP (ref 0–6)
EOSINOPHIL NFR BLD AUTO: 0 % — SIGNIFICANT CHANGE UP (ref 0–6)
GLUCOSE BLDA-MCNC: 157 MG/DL — HIGH (ref 70–99)
GLUCOSE BLDA-MCNC: 167 MG/DL — HIGH (ref 70–99)
GLUCOSE BLDA-MCNC: 184 MG/DL — HIGH (ref 70–99)
GLUCOSE BLDA-MCNC: 239 MG/DL — HIGH (ref 70–99)
GLUCOSE SERPL-MCNC: 172 MG/DL — HIGH (ref 70–99)
GLUCOSE SERPL-MCNC: 205 MG/DL — HIGH (ref 70–99)
GLUCOSE SERPL-MCNC: 260 MG/DL — HIGH (ref 70–99)
GRAM STN SPT: SIGNIFICANT CHANGE UP
HCO3 BLDA-SCNC: 33 MMOL/L — HIGH (ref 22–26)
HCO3 BLDA-SCNC: 34 MMOL/L — HIGH (ref 22–26)
HCO3 BLDA-SCNC: 34 MMOL/L — HIGH (ref 22–26)
HCO3 BLDA-SCNC: 35 MMOL/L — HIGH (ref 22–26)
HCT VFR BLD CALC: 31.6 % — LOW (ref 39–50)
HCT VFR BLD CALC: 33.1 % — LOW (ref 39–50)
HCT VFR BLDA CALC: 29 % — LOW (ref 39–51)
HCT VFR BLDA CALC: 30.2 % — LOW (ref 39–51)
HCT VFR BLDA CALC: 30.7 % — LOW (ref 39–51)
HCT VFR BLDA CALC: 31 % — LOW (ref 39–51)
HGB BLD-MCNC: 9.7 G/DL — LOW (ref 13–17)
HGB BLD-MCNC: 9.8 G/DL — LOW (ref 13–17)
HGB BLDA-MCNC: 10 G/DL — LOW (ref 13–17)
HGB BLDA-MCNC: 9.4 G/DL — LOW (ref 13–17)
HGB BLDA-MCNC: 9.8 G/DL — LOW (ref 13–17)
HGB BLDA-MCNC: 9.9 G/DL — LOW (ref 13–17)
IMM GRANULOCYTES NFR BLD AUTO: 1.2 % — SIGNIFICANT CHANGE UP (ref 0–1.5)
IMM GRANULOCYTES NFR BLD AUTO: 1.5 % — SIGNIFICANT CHANGE UP (ref 0–1.5)
LACTATE BLDA-SCNC: 1.4 MMOL/L — SIGNIFICANT CHANGE UP (ref 0.5–2)
LACTATE BLDA-SCNC: 2 MMOL/L — SIGNIFICANT CHANGE UP (ref 0.5–2)
LACTATE BLDA-SCNC: 2.2 MMOL/L — HIGH (ref 0.5–2)
LYMPHOCYTES # BLD AUTO: 0.21 K/UL — LOW (ref 1–3.3)
LYMPHOCYTES # BLD AUTO: 0.38 K/UL — LOW (ref 1–3.3)
LYMPHOCYTES # BLD AUTO: 0.6 % — LOW (ref 13–44)
LYMPHOCYTES # BLD AUTO: 1 % — LOW (ref 13–44)
MAGNESIUM SERPL-MCNC: 2 MG/DL — SIGNIFICANT CHANGE UP (ref 1.6–2.6)
MAGNESIUM SERPL-MCNC: 2.1 MG/DL — SIGNIFICANT CHANGE UP (ref 1.6–2.6)
MAGNESIUM SERPL-MCNC: 2.1 MG/DL — SIGNIFICANT CHANGE UP (ref 1.6–2.6)
MANUAL SMEAR VERIFICATION: SIGNIFICANT CHANGE UP
MCHC RBC-ENTMCNC: 27.2 PG — SIGNIFICANT CHANGE UP (ref 27–34)
MCHC RBC-ENTMCNC: 27.6 PG — SIGNIFICANT CHANGE UP (ref 27–34)
MCHC RBC-ENTMCNC: 29.6 % — LOW (ref 32–36)
MCHC RBC-ENTMCNC: 30.7 % — LOW (ref 32–36)
MCV RBC AUTO: 90 FL — SIGNIFICANT CHANGE UP (ref 80–100)
MCV RBC AUTO: 91.9 FL — SIGNIFICANT CHANGE UP (ref 80–100)
MONOCYTES # BLD AUTO: 2.1 K/UL — HIGH (ref 0–0.9)
MONOCYTES # BLD AUTO: 2.54 K/UL — HIGH (ref 0–0.9)
MONOCYTES NFR BLD AUTO: 6.2 % — SIGNIFICANT CHANGE UP (ref 2–14)
MONOCYTES NFR BLD AUTO: 7 % — SIGNIFICANT CHANGE UP (ref 2–14)
NEUTROPHILS # BLD AUTO: 30.79 K/UL — HIGH (ref 1.8–7.4)
NEUTROPHILS # BLD AUTO: 32.92 K/UL — HIGH (ref 1.8–7.4)
NEUTROPHILS NFR BLD AUTO: 90.7 % — HIGH (ref 43–77)
NEUTROPHILS NFR BLD AUTO: 91.6 % — HIGH (ref 43–77)
NRBC # FLD: 0 K/UL — SIGNIFICANT CHANGE UP (ref 0–0)
NRBC # FLD: 0 K/UL — SIGNIFICANT CHANGE UP (ref 0–0)
PCO2 BLDA: 43 MMHG — SIGNIFICANT CHANGE UP (ref 35–48)
PCO2 BLDA: 55 MMHG — HIGH (ref 35–48)
PCO2 BLDA: 57 MMHG — HIGH (ref 35–48)
PCO2 BLDA: 61 MMHG — HIGH (ref 35–48)
PCO2 BLDA: 76 MMHG — CRITICAL HIGH (ref 35–48)
PH BLDA: 7.31 PH — LOW (ref 7.35–7.45)
PH BLDA: 7.39 PH — SIGNIFICANT CHANGE UP (ref 7.35–7.45)
PH BLDA: 7.41 PH — SIGNIFICANT CHANGE UP (ref 7.35–7.45)
PH BLDA: 7.44 PH — SIGNIFICANT CHANGE UP (ref 7.35–7.45)
PH BLDA: 7.51 PH — HIGH (ref 7.35–7.45)
PHOSPHATE SERPL-MCNC: 2.5 MG/DL — SIGNIFICANT CHANGE UP (ref 2.5–4.5)
PHOSPHATE SERPL-MCNC: 2.5 MG/DL — SIGNIFICANT CHANGE UP (ref 2.5–4.5)
PHOSPHATE SERPL-MCNC: 4.2 MG/DL — SIGNIFICANT CHANGE UP (ref 2.5–4.5)
PLATELET # BLD AUTO: 120 K/UL — LOW (ref 150–400)
PLATELET # BLD AUTO: 124 K/UL — LOW (ref 150–400)
PMV BLD: 13.8 FL — HIGH (ref 7–13)
PMV BLD: 14.2 FL — HIGH (ref 7–13)
PO2 BLDA: 101 MMHG — SIGNIFICANT CHANGE UP (ref 83–108)
PO2 BLDA: 182 MMHG — HIGH (ref 83–108)
PO2 BLDA: 184 MMHG — HIGH (ref 83–108)
PO2 BLDA: 395 MMHG — HIGH (ref 83–108)
PO2 BLDA: 93 MMHG — SIGNIFICANT CHANGE UP (ref 83–108)
POTASSIUM BLDA-SCNC: 3.6 MMOL/L — SIGNIFICANT CHANGE UP (ref 3.4–4.5)
POTASSIUM BLDA-SCNC: 3.7 MMOL/L — SIGNIFICANT CHANGE UP (ref 3.4–4.5)
POTASSIUM BLDA-SCNC: 3.8 MMOL/L — SIGNIFICANT CHANGE UP (ref 3.4–4.5)
POTASSIUM BLDA-SCNC: 4.2 MMOL/L — SIGNIFICANT CHANGE UP (ref 3.4–4.5)
POTASSIUM SERPL-MCNC: 4.1 MMOL/L — SIGNIFICANT CHANGE UP (ref 3.5–5.3)
POTASSIUM SERPL-MCNC: 4.3 MMOL/L — SIGNIFICANT CHANGE UP (ref 3.5–5.3)
POTASSIUM SERPL-MCNC: 4.4 MMOL/L — SIGNIFICANT CHANGE UP (ref 3.5–5.3)
POTASSIUM SERPL-SCNC: 4.1 MMOL/L — SIGNIFICANT CHANGE UP (ref 3.5–5.3)
POTASSIUM SERPL-SCNC: 4.3 MMOL/L — SIGNIFICANT CHANGE UP (ref 3.5–5.3)
POTASSIUM SERPL-SCNC: 4.4 MMOL/L — SIGNIFICANT CHANGE UP (ref 3.5–5.3)
PROT SERPL-MCNC: 6 G/DL — SIGNIFICANT CHANGE UP (ref 6–8.3)
PROT SERPL-MCNC: 6.1 G/DL — SIGNIFICANT CHANGE UP (ref 6–8.3)
PROT SERPL-MCNC: 6.1 G/DL — SIGNIFICANT CHANGE UP (ref 6–8.3)
RBC # BLD: 3.51 M/UL — LOW (ref 4.2–5.8)
RBC # BLD: 3.6 M/UL — LOW (ref 4.2–5.8)
RBC # FLD: 15.7 % — HIGH (ref 10.3–14.5)
RBC # FLD: 15.8 % — HIGH (ref 10.3–14.5)
SAO2 % BLDA: 97.5 % — SIGNIFICANT CHANGE UP (ref 95–99)
SAO2 % BLDA: 97.5 % — SIGNIFICANT CHANGE UP (ref 95–99)
SAO2 % BLDA: 98.7 % — SIGNIFICANT CHANGE UP (ref 95–99)
SAO2 % BLDA: 99.9 % — HIGH (ref 95–99)
SODIUM BLDA-SCNC: 138 MMOL/L — SIGNIFICANT CHANGE UP (ref 136–146)
SODIUM BLDA-SCNC: 139 MMOL/L — SIGNIFICANT CHANGE UP (ref 136–146)
SODIUM BLDA-SCNC: 140 MMOL/L — SIGNIFICANT CHANGE UP (ref 136–146)
SODIUM BLDA-SCNC: 141 MMOL/L — SIGNIFICANT CHANGE UP (ref 136–146)
SODIUM SERPL-SCNC: 142 MMOL/L — SIGNIFICANT CHANGE UP (ref 135–145)
SODIUM SERPL-SCNC: 142 MMOL/L — SIGNIFICANT CHANGE UP (ref 135–145)
SODIUM SERPL-SCNC: 145 MMOL/L — SIGNIFICANT CHANGE UP (ref 135–145)
SPECIMEN SOURCE: SIGNIFICANT CHANGE UP
VANCOMYCIN TROUGH SERPL-MCNC: 12.4 UG/ML — SIGNIFICANT CHANGE UP (ref 10–20)
WBC # BLD: 33.65 K/UL — HIGH (ref 3.8–10.5)
WBC # BLD: 36.3 K/UL — HIGH (ref 3.8–10.5)
WBC # FLD AUTO: 33.65 K/UL — HIGH (ref 3.8–10.5)
WBC # FLD AUTO: 36.3 K/UL — HIGH (ref 3.8–10.5)

## 2019-07-06 PROCEDURE — 99291 CRITICAL CARE FIRST HOUR: CPT

## 2019-07-06 PROCEDURE — 76604 US EXAM CHEST: CPT | Mod: 26

## 2019-07-06 RX ORDER — CISATRACURIUM BESYLATE 2 MG/ML
20 INJECTION INTRAVENOUS ONCE
Refills: 0 | Status: COMPLETED | OUTPATIENT
Start: 2019-07-06 | End: 2019-07-06

## 2019-07-06 RX ADMIN — MEROPENEM 100 MILLIGRAM(S): 1 INJECTION INTRAVENOUS at 13:03

## 2019-07-06 RX ADMIN — CHLORHEXIDINE GLUCONATE 1 APPLICATION(S): 213 SOLUTION TOPICAL at 08:54

## 2019-07-06 RX ADMIN — PROPOFOL 20.02 MICROGRAM(S)/KG/MIN: 10 INJECTION, EMULSION INTRAVENOUS at 19:34

## 2019-07-06 RX ADMIN — FENTANYL CITRATE 4.17 MICROGRAM(S)/KG/HR: 50 INJECTION INTRAVENOUS at 08:54

## 2019-07-06 RX ADMIN — Medication 166.67 MILLIGRAM(S): at 11:04

## 2019-07-06 RX ADMIN — MEROPENEM 100 MILLIGRAM(S): 1 INJECTION INTRAVENOUS at 06:30

## 2019-07-06 RX ADMIN — PROPOFOL 20.02 MICROGRAM(S)/KG/MIN: 10 INJECTION, EMULSION INTRAVENOUS at 08:54

## 2019-07-06 RX ADMIN — PANTOPRAZOLE SODIUM 40 MILLIGRAM(S): 20 TABLET, DELAYED RELEASE ORAL at 11:04

## 2019-07-06 RX ADMIN — FENTANYL CITRATE 4.17 MICROGRAM(S)/KG/HR: 50 INJECTION INTRAVENOUS at 19:34

## 2019-07-06 RX ADMIN — Medication 40 MILLIGRAM(S): at 17:10

## 2019-07-06 RX ADMIN — HEPARIN SODIUM 5000 UNIT(S): 5000 INJECTION INTRAVENOUS; SUBCUTANEOUS at 06:31

## 2019-07-06 RX ADMIN — Medication 3 MILLILITER(S): at 23:29

## 2019-07-06 RX ADMIN — MEROPENEM 100 MILLIGRAM(S): 1 INJECTION INTRAVENOUS at 21:08

## 2019-07-06 RX ADMIN — PHENYLEPHRINE HYDROCHLORIDE 15.64 MICROGRAM(S)/KG/MIN: 10 INJECTION INTRAVENOUS at 19:34

## 2019-07-06 RX ADMIN — CISATRACURIUM BESYLATE 20 MILLIGRAM(S): 2 INJECTION INTRAVENOUS at 00:45

## 2019-07-06 RX ADMIN — Medication 40 MILLIGRAM(S): at 06:30

## 2019-07-06 RX ADMIN — CHLORHEXIDINE GLUCONATE 15 MILLILITER(S): 213 SOLUTION TOPICAL at 17:10

## 2019-07-06 RX ADMIN — HEPARIN SODIUM 5000 UNIT(S): 5000 INJECTION INTRAVENOUS; SUBCUTANEOUS at 17:10

## 2019-07-06 RX ADMIN — PHENYLEPHRINE HYDROCHLORIDE 15.64 MICROGRAM(S)/KG/MIN: 10 INJECTION INTRAVENOUS at 08:54

## 2019-07-06 RX ADMIN — Medication 3.91 MICROGRAM(S)/KG/MIN: at 19:34

## 2019-07-06 RX ADMIN — Medication 166.67 MILLIGRAM(S): at 00:00

## 2019-07-06 RX ADMIN — CISATRACURIUM BESYLATE 15.01 MICROGRAM(S)/KG/MIN: 2 INJECTION INTRAVENOUS at 19:35

## 2019-07-06 RX ADMIN — CHLORHEXIDINE GLUCONATE 15 MILLILITER(S): 213 SOLUTION TOPICAL at 06:30

## 2019-07-06 RX ADMIN — MIDAZOLAM HYDROCHLORIDE 1.67 MG/KG/HR: 1 INJECTION, SOLUTION INTRAMUSCULAR; INTRAVENOUS at 08:54

## 2019-07-06 RX ADMIN — MIDAZOLAM HYDROCHLORIDE 1.67 MG/KG/HR: 1 INJECTION, SOLUTION INTRAMUSCULAR; INTRAVENOUS at 19:34

## 2019-07-06 RX ADMIN — Medication 3.91 MICROGRAM(S)/KG/MIN: at 08:54

## 2019-07-06 RX ADMIN — Medication 3 MILLILITER(S): at 17:52

## 2019-07-06 RX ADMIN — CISATRACURIUM BESYLATE 15.01 MICROGRAM(S)/KG/MIN: 2 INJECTION INTRAVENOUS at 08:54

## 2019-07-06 NOTE — PROGRESS NOTE ADULT - SUBJECTIVE AND OBJECTIVE BOX
*** incomplete note ***        INTERVAL HPI/OVERNIGHT EVENTS:    SUBJECTIVE: Patient seen and examined at bedside.     CONSTITUTIONAL: No weakness, fevers or chills  EYES/ENT: No visual changes;  No vertigo or throat pain   NECK: No pain or stiffness  RESPIRATORY: No cough, wheezing, hemoptysis; No shortness of breath  CARDIOVASCULAR: No chest pain or palpitations  GASTROINTESTINAL: No abdominal or epigastric pain. No nausea, vomiting, or hematemesis; No diarrhea or constipation. No melena or hematochezia.  GENITOURINARY: No dysuria, frequency or hematuria  NEUROLOGICAL: No numbness or weakness  SKIN: No itching, rashes    OBJECTIVE:    VITAL SIGNS:  ICU Vital Signs Last 24 Hrs  T(C): 36.2 (06 Jul 2019 04:00), Max: 36.9 (05 Jul 2019 12:00)  T(F): 97.1 (06 Jul 2019 04:00), Max: 98.4 (05 Jul 2019 12:00)  HR: 79 (06 Jul 2019 07:10) (64 - 116)  BP: --  BP(mean): --  ABP: 105/74 (06 Jul 2019 06:00) (64/42 - 149/98)  ABP(mean): 86 (06 Jul 2019 06:00) (49 - 110)  RR: 14 (06 Jul 2019 06:00) (7 - 34)  SpO2: 97% (06 Jul 2019 07:10) (73% - 100%)    Mode: AC/ CMV (Assist Control/ Continuous Mandatory Ventilation), RR (machine): 12, TV (machine): 380, FiO2: 30, PEEP: 5, MAP: 22, PIP: 48    07-05 @ 07:01  -  07-06 @ 07:00  --------------------------------------------------------  IN: 2714.6 mL / OUT: 2125 mL / NET: 589.6 mL      CAPILLARY BLOOD GLUCOSE          PHYSICAL EXAM:    General: NAD  HEENT: NC/AT; PERRL, clear conjunctiva  Neck: supple  Respiratory: CTA b/l  Cardiovascular: +S1/S2; RRR  Abdomen: soft, NT/ND; +BS x4  Extremities: WWP, 2+ peripheral pulses b/l; no LE edema  Skin: normal color and turgor; no rash  Neurological:    MEDICATIONS:  MEDICATIONS  (STANDING):  ALBUTerol/ipratropium for Nebulization 3 milliLiter(s) Nebulizer every 6 hours  alteplase    Bolus 50 milliGRAM(s) IV Bolus once  chlorhexidine 4% Liquid 1 Application(s) Topical <User Schedule>  cisatracurium Infusion 3 MICROgram(s)/kG/Min (15.012 mL/Hr) IV Continuous <Continuous>  dexmedetomidine Infusion 0.7 MICROgram(s)/kG/Hr (14.595 mL/Hr) IV Continuous <Continuous>  fentaNYL   Infusion. 0.5 MICROgram(s)/kG/Hr (4.17 mL/Hr) IV Continuous <Continuous>  heparin  Injectable 5000 Unit(s) SubCutaneous every 12 hours  meropenem  IVPB 1000 milliGRAM(s) IV Intermittent every 8 hours  methylPREDNISolone sodium succinate Injectable 40 milliGRAM(s) IV Push every 12 hours  midazolam Infusion 0.02 mG/kG/Hr (1.668 mL/Hr) IV Continuous <Continuous>  norepinephrine Infusion 0.05 MICROgram(s)/kG/Min (3.909 mL/Hr) IV Continuous <Continuous>  pantoprazole  Injectable 40 milliGRAM(s) IV Push daily  phenylephrine    Infusion 0.5 MICROgram(s)/kG/Min (15.637 mL/Hr) IV Continuous <Continuous>  propofol Infusion 40 MICROgram(s)/kG/Min (20.016 mL/Hr) IV Continuous <Continuous>  vancomycin  IVPB 1250 milliGRAM(s) IV Intermittent every 12 hours    MEDICATIONS  (PRN):  bisacodyl Suppository 10 milliGRAM(s) Rectal daily PRN Constipation  sodium chloride 0.9% lock flush 10 milliLiter(s) IV Push every 1 hour PRN Pre/post blood products, medications, blood draw, and to maintain line patency      ALLERGIES:  Allergies    No Known Allergies    Intolerances        LABS:                        9.7    36.30 )-----------( 120      ( 06 Jul 2019 04:50 )             31.6     07-06    142  |  100  |  25<H>  ----------------------------<  205<H>  4.3   |  30  |  0.78    Ca    8.9      06 Jul 2019 04:50  Phos  2.5     07-06  Mg     2.1     07-06    TPro  6.1  /  Alb  2.9<L>  /  TBili  0.3  /  DBili  x   /  AST  55<H>  /  ALT  81<H>  /  AlkPhos  75  07-06    PT/INR - ( 05 Jul 2019 20:20 )   PT: 13.1 SEC;   INR: 1.17          PTT - ( 06 Jul 2019 00:10 )  PTT:23.8 SEC      RADIOLOGY & ADDITIONAL TESTS: Reviewed. *** incomplete note ***        INTERVAL HPI/OVERNIGHT EVENTS:    SUBJECTIVE: Patient coded 8PM. ROSC achieved after several minutes. Bronchoscopy was performed to remove thick green mucus. Another code at 11PM (3 PEAs over 45 mins) yesterday. Mild respiratory acidosis noted prior to these events. Patient seen and examined at bedside. Unresponsive to sternal rubs. ROS unable to assess due to sedation and intubation.    OBJECTIVE:    VITAL SIGNS:  ICU Vital Signs Last 24 Hrs  T(C): 36.2 (06 Jul 2019 04:00), Max: 36.9 (05 Jul 2019 12:00)  T(F): 97.1 (06 Jul 2019 04:00), Max: 98.4 (05 Jul 2019 12:00)  HR: 79 (06 Jul 2019 07:10) (64 - 116)  BP: --  BP(mean): --  ABP: 105/74 (06 Jul 2019 06:00) (64/42 - 149/98)  ABP(mean): 86 (06 Jul 2019 06:00) (49 - 110)  RR: 14 (06 Jul 2019 06:00) (7 - 34)  SpO2: 97% (06 Jul 2019 07:10) (73% - 100%)    Mode: AC/ CMV (Assist Control/ Continuous Mandatory Ventilation), RR (machine): 12, TV (machine): 380, FiO2: 30, PEEP: 5, MAP: 22, PIP: 48    07-05 @ 07:01  -  07-06 @ 07:00  --------------------------------------------------------  IN: 2714.6 mL / OUT: 2125 mL / NET: 589.6 mL      CAPILLARY BLOOD GLUCOSE          PHYSICAL EXAM:    General: NAD  HEENT: NC/AT; PERRL, clear conjunctiva  Neck: supple  Respiratory: CTA b/l  Cardiovascular: +S1/S2; RRR  Abdomen: soft, NT/ND; +BS x4  Extremities: WWP, 2+ peripheral pulses b/l; no LE edema  Skin: normal color and turgor; no rash  Neurological:    MEDICATIONS:  MEDICATIONS  (STANDING):  ALBUTerol/ipratropium for Nebulization 3 milliLiter(s) Nebulizer every 6 hours  alteplase    Bolus 50 milliGRAM(s) IV Bolus once  chlorhexidine 4% Liquid 1 Application(s) Topical <User Schedule>  cisatracurium Infusion 3 MICROgram(s)/kG/Min (15.012 mL/Hr) IV Continuous <Continuous>  dexmedetomidine Infusion 0.7 MICROgram(s)/kG/Hr (14.595 mL/Hr) IV Continuous <Continuous>  fentaNYL   Infusion. 0.5 MICROgram(s)/kG/Hr (4.17 mL/Hr) IV Continuous <Continuous>  heparin  Injectable 5000 Unit(s) SubCutaneous every 12 hours  meropenem  IVPB 1000 milliGRAM(s) IV Intermittent every 8 hours  methylPREDNISolone sodium succinate Injectable 40 milliGRAM(s) IV Push every 12 hours  midazolam Infusion 0.02 mG/kG/Hr (1.668 mL/Hr) IV Continuous <Continuous>  norepinephrine Infusion 0.05 MICROgram(s)/kG/Min (3.909 mL/Hr) IV Continuous <Continuous>  pantoprazole  Injectable 40 milliGRAM(s) IV Push daily  phenylephrine    Infusion 0.5 MICROgram(s)/kG/Min (15.637 mL/Hr) IV Continuous <Continuous>  propofol Infusion 40 MICROgram(s)/kG/Min (20.016 mL/Hr) IV Continuous <Continuous>  vancomycin  IVPB 1250 milliGRAM(s) IV Intermittent every 12 hours    MEDICATIONS  (PRN):  bisacodyl Suppository 10 milliGRAM(s) Rectal daily PRN Constipation  sodium chloride 0.9% lock flush 10 milliLiter(s) IV Push every 1 hour PRN Pre/post blood products, medications, blood draw, and to maintain line patency      ALLERGIES:  Allergies    No Known Allergies    Intolerances        LABS:                        9.7    36.30 )-----------( 120      ( 06 Jul 2019 04:50 )             31.6     07-06    142  |  100  |  25<H>  ----------------------------<  205<H>  4.3   |  30  |  0.78    Ca    8.9      06 Jul 2019 04:50  Phos  2.5     07-06  Mg     2.1     07-06    TPro  6.1  /  Alb  2.9<L>  /  TBili  0.3  /  DBili  x   /  AST  55<H>  /  ALT  81<H>  /  AlkPhos  75  07-06    PT/INR - ( 05 Jul 2019 20:20 )   PT: 13.1 SEC;   INR: 1.17          PTT - ( 06 Jul 2019 00:10 )  PTT:23.8 SEC      RADIOLOGY & ADDITIONAL TESTS: Reviewed. Peyman Aiken MD, PhD | PGY-2  Department of Internal Medicine  Pager [phone] ([facility]) / [phone] ([facility])      INTERVAL HPI/OVERNIGHT EVENTS:    SUBJECTIVE: Patient coded 8PM. ROSC achieved after several minutes. Bronchoscopy was performed to remove thick green mucus. Another code at 11PM (3 PEAs over 45 mins) yesterday. Mild respiratory acidosis noted prior to these events. Patient seen and examined at bedside. Unresponsive to sternal rubs. ROS unable to assess due to sedation and intubation.    OBJECTIVE:    VITAL SIGNS:  ICU Vital Signs Last 24 Hrs  T(C): 36.2 (06 Jul 2019 04:00), Max: 36.9 (05 Jul 2019 12:00)  T(F): 97.1 (06 Jul 2019 04:00), Max: 98.4 (05 Jul 2019 12:00)  HR: 79 (06 Jul 2019 07:10) (64 - 116)  BP: --  BP(mean): --  ABP: 105/74 (06 Jul 2019 06:00) (64/42 - 149/98)  ABP(mean): 86 (06 Jul 2019 06:00) (49 - 110)  RR: 14 (06 Jul 2019 06:00) (7 - 34)  SpO2: 97% (06 Jul 2019 07:10) (73% - 100%)    Mode: AC/ CMV (Assist Control/ Continuous Mandatory Ventilation), RR (machine): 12, TV (machine): 380, FiO2: 30, PEEP: 5, MAP: 22, PIP: 48    07-05 @ 07:01  -  07-06 @ 07:00  --------------------------------------------------------  IN: 2714.6 mL / OUT: 2125 mL / NET: 589.6 mL      CAPILLARY BLOOD GLUCOSE          PHYSICAL EXAM:    General: NAD  HEENT: NC/AT; PERRL, clear conjunctiva  Neck: supple  Respiratory: CTA b/l  Cardiovascular: +S1/S2; RRR  Abdomen: soft, NT/ND; +BS x4  Extremities: WWP, 2+ peripheral pulses b/l; no LE edema  Skin: normal color and turgor; no rash  Neurological:    MEDICATIONS:  MEDICATIONS  (STANDING):  ALBUTerol/ipratropium for Nebulization 3 milliLiter(s) Nebulizer every 6 hours  alteplase    Bolus 50 milliGRAM(s) IV Bolus once  chlorhexidine 4% Liquid 1 Application(s) Topical <User Schedule>  cisatracurium Infusion 3 MICROgram(s)/kG/Min (15.012 mL/Hr) IV Continuous <Continuous>  dexmedetomidine Infusion 0.7 MICROgram(s)/kG/Hr (14.595 mL/Hr) IV Continuous <Continuous>  fentaNYL   Infusion. 0.5 MICROgram(s)/kG/Hr (4.17 mL/Hr) IV Continuous <Continuous>  heparin  Injectable 5000 Unit(s) SubCutaneous every 12 hours  meropenem  IVPB 1000 milliGRAM(s) IV Intermittent every 8 hours  methylPREDNISolone sodium succinate Injectable 40 milliGRAM(s) IV Push every 12 hours  midazolam Infusion 0.02 mG/kG/Hr (1.668 mL/Hr) IV Continuous <Continuous>  norepinephrine Infusion 0.05 MICROgram(s)/kG/Min (3.909 mL/Hr) IV Continuous <Continuous>  pantoprazole  Injectable 40 milliGRAM(s) IV Push daily  phenylephrine    Infusion 0.5 MICROgram(s)/kG/Min (15.637 mL/Hr) IV Continuous <Continuous>  propofol Infusion 40 MICROgram(s)/kG/Min (20.016 mL/Hr) IV Continuous <Continuous>  vancomycin  IVPB 1250 milliGRAM(s) IV Intermittent every 12 hours    MEDICATIONS  (PRN):  bisacodyl Suppository 10 milliGRAM(s) Rectal daily PRN Constipation  sodium chloride 0.9% lock flush 10 milliLiter(s) IV Push every 1 hour PRN Pre/post blood products, medications, blood draw, and to maintain line patency      ALLERGIES:  Allergies    No Known Allergies    Intolerances        LABS:                        9.7    36.30 )-----------( 120      ( 06 Jul 2019 04:50 )             31.6     07-06    142  |  100  |  25<H>  ----------------------------<  205<H>  4.3   |  30  |  0.78    Ca    8.9      06 Jul 2019 04:50  Phos  2.5     07-06  Mg     2.1     07-06    TPro  6.1  /  Alb  2.9<L>  /  TBili  0.3  /  DBili  x   /  AST  55<H>  /  ALT  81<H>  /  AlkPhos  75  07-06    PT/INR - ( 05 Jul 2019 20:20 )   PT: 13.1 SEC;   INR: 1.17          PTT - ( 06 Jul 2019 00:10 )  PTT:23.8 SEC      RADIOLOGY & ADDITIONAL TESTS: Reviewed. Peyman Aiken MD, PhD | PGY-2  Department of Internal Medicine  Pager [phone] ([facility]) / [phone] ([facility])      INTERVAL HPI/OVERNIGHT EVENTS:    SUBJECTIVE: Patient coded 8PM yesterday. ROSC achieved after several minutes. Bronchoscopy was performed to remove thick green mucus. Patient was started on Nimbex. Another code at 11PM (3 PEAs over 45 mins). 3-4 epi's and about 15 minutes of chest compression performed. ROSC achieved again. POCUS with RV enlargement. Respiratory acidosis (7.27) noted prior to these events. Patient seen and examined at bedside. Unresponsive to sternal rubs. ROS unable to assess due to sedation and intubation.    OBJECTIVE:    VITAL SIGNS:  ICU Vital Signs Last 24 Hrs  T(C): 36.2 (06 Jul 2019 04:00), Max: 36.9 (05 Jul 2019 12:00)  T(F): 97.1 (06 Jul 2019 04:00), Max: 98.4 (05 Jul 2019 12:00)  HR: 79 (06 Jul 2019 07:10) (64 - 116)  BP: --  BP(mean): --  ABP: 105/74 (06 Jul 2019 06:00) (64/42 - 149/98)  ABP(mean): 86 (06 Jul 2019 06:00) (49 - 110)  RR: 14 (06 Jul 2019 06:00) (7 - 34)  SpO2: 97% (06 Jul 2019 07:10) (73% - 100%)    Mode: AC/ CMV (Assist Control/ Continuous Mandatory Ventilation), RR (machine): 12, TV (machine): 380, FiO2: 30, PEEP: 5, MAP: 22, PIP: 48    07-05 @ 07:01  -  07-06 @ 07:00  --------------------------------------------------------  IN: 2714.6 mL / OUT: 2125 mL / NET: 589.6 mL      CAPILLARY BLOOD GLUCOSE          PHYSICAL EXAM:    General: NAD  HEENT: NC/AT; PERRL, clear conjunctiva  Neck: supple  Respiratory: CTA b/l  Cardiovascular: +S1/S2; RRR  Abdomen: soft, NT/ND; +BS x4  Extremities: WWP, 2+ peripheral pulses b/l; no LE edema  Skin: normal color and turgor; no rash  Neurological:    MEDICATIONS:  MEDICATIONS  (STANDING):  ALBUTerol/ipratropium for Nebulization 3 milliLiter(s) Nebulizer every 6 hours  alteplase    Bolus 50 milliGRAM(s) IV Bolus once  chlorhexidine 4% Liquid 1 Application(s) Topical <User Schedule>  cisatracurium Infusion 3 MICROgram(s)/kG/Min (15.012 mL/Hr) IV Continuous <Continuous>  dexmedetomidine Infusion 0.7 MICROgram(s)/kG/Hr (14.595 mL/Hr) IV Continuous <Continuous>  fentaNYL   Infusion. 0.5 MICROgram(s)/kG/Hr (4.17 mL/Hr) IV Continuous <Continuous>  heparin  Injectable 5000 Unit(s) SubCutaneous every 12 hours  meropenem  IVPB 1000 milliGRAM(s) IV Intermittent every 8 hours  methylPREDNISolone sodium succinate Injectable 40 milliGRAM(s) IV Push every 12 hours  midazolam Infusion 0.02 mG/kG/Hr (1.668 mL/Hr) IV Continuous <Continuous>  norepinephrine Infusion 0.05 MICROgram(s)/kG/Min (3.909 mL/Hr) IV Continuous <Continuous>  pantoprazole  Injectable 40 milliGRAM(s) IV Push daily  phenylephrine    Infusion 0.5 MICROgram(s)/kG/Min (15.637 mL/Hr) IV Continuous <Continuous>  propofol Infusion 40 MICROgram(s)/kG/Min (20.016 mL/Hr) IV Continuous <Continuous>  vancomycin  IVPB 1250 milliGRAM(s) IV Intermittent every 12 hours    MEDICATIONS  (PRN):  bisacodyl Suppository 10 milliGRAM(s) Rectal daily PRN Constipation  sodium chloride 0.9% lock flush 10 milliLiter(s) IV Push every 1 hour PRN Pre/post blood products, medications, blood draw, and to maintain line patency      ALLERGIES:  Allergies    No Known Allergies    Intolerances        LABS:                        9.7    36.30 )-----------( 120      ( 06 Jul 2019 04:50 )             31.6     07-06    142  |  100  |  25<H>  ----------------------------<  205<H>  4.3   |  30  |  0.78    Ca    8.9      06 Jul 2019 04:50  Phos  2.5     07-06  Mg     2.1     07-06    TPro  6.1  /  Alb  2.9<L>  /  TBili  0.3  /  DBili  x   /  AST  55<H>  /  ALT  81<H>  /  AlkPhos  75  07-06    PT/INR - ( 05 Jul 2019 20:20 )   PT: 13.1 SEC;   INR: 1.17          PTT - ( 06 Jul 2019 00:10 )  PTT:23.8 SEC      RADIOLOGY & ADDITIONAL TESTS: Reviewed. Peyman Aiken MD, PhD | PGY-2  Department of Internal Medicine  Pager [phone] ([facility]) / [phone] ([facility])      INTERVAL HPI/OVERNIGHT EVENTS:    SUBJECTIVE: Patient coded 8PM yesterday. ROSC achieved after several minutes. Bronchoscopy was performed to remove thick green mucus. Patient was started on Nimbex. Another code at 11PM (3 PEAs over 45 mins). 3-4 epi's and about 15 minutes of chest compression performed. ROSC achieved again. POCUS with RV enlargement. Respiratory acidosis (7.27) noted prior to these events. Patient seen and examined at bedside. Unresponsive to sternal rubs. ROS unable to assess due to sedation and intubation.    OBJECTIVE:    VITAL SIGNS:  ICU Vital Signs Last 24 Hrs  T(C): 36.2 (06 Jul 2019 04:00), Max: 36.9 (05 Jul 2019 12:00)  T(F): 97.1 (06 Jul 2019 04:00), Max: 98.4 (05 Jul 2019 12:00)  HR: 79 (06 Jul 2019 07:10) (64 - 116)  BP: --  BP(mean): --  ABP: 105/74 (06 Jul 2019 06:00) (64/42 - 149/98)  ABP(mean): 86 (06 Jul 2019 06:00) (49 - 110)  RR: 14 (06 Jul 2019 06:00) (7 - 34)  SpO2: 97% (06 Jul 2019 07:10) (73% - 100%)    Mode: AC/ CMV (Assist Control/ Continuous Mandatory Ventilation), RR (machine): 12, TV (machine): 380, FiO2: 30, PEEP: 5, MAP: 22, PIP: 48    07-05 @ 07:01  -  07-06 @ 07:00  --------------------------------------------------------  IN: 2714.6 mL / OUT: 2125 mL / NET: 589.6 mL      CAPILLARY BLOOD GLUCOSE          PHYSICAL EXAM:    General: NAD  HEENT: NC/AT; PERRL, clear conjunctiva  Neck: supple; L IJ triple lumen in site, nonbleeding and nonerythematous  Respiratory: CTA b/l  Cardiovascular: +S1/S2; RRR  Abdomen: soft, ND; +BS  Extremities: no LE edema  Skin: normal color and turgor; no rash  Neurological:    MEDICATIONS:  MEDICATIONS  (STANDING):  ALBUTerol/ipratropium for Nebulization 3 milliLiter(s) Nebulizer every 6 hours  alteplase    Bolus 50 milliGRAM(s) IV Bolus once  chlorhexidine 4% Liquid 1 Application(s) Topical <User Schedule>  cisatracurium Infusion 3 MICROgram(s)/kG/Min (15.012 mL/Hr) IV Continuous <Continuous>  dexmedetomidine Infusion 0.7 MICROgram(s)/kG/Hr (14.595 mL/Hr) IV Continuous <Continuous>  fentaNYL   Infusion. 0.5 MICROgram(s)/kG/Hr (4.17 mL/Hr) IV Continuous <Continuous>  heparin  Injectable 5000 Unit(s) SubCutaneous every 12 hours  meropenem  IVPB 1000 milliGRAM(s) IV Intermittent every 8 hours  methylPREDNISolone sodium succinate Injectable 40 milliGRAM(s) IV Push every 12 hours  midazolam Infusion 0.02 mG/kG/Hr (1.668 mL/Hr) IV Continuous <Continuous>  norepinephrine Infusion 0.05 MICROgram(s)/kG/Min (3.909 mL/Hr) IV Continuous <Continuous>  pantoprazole  Injectable 40 milliGRAM(s) IV Push daily  phenylephrine    Infusion 0.5 MICROgram(s)/kG/Min (15.637 mL/Hr) IV Continuous <Continuous>  propofol Infusion 40 MICROgram(s)/kG/Min (20.016 mL/Hr) IV Continuous <Continuous>  vancomycin  IVPB 1250 milliGRAM(s) IV Intermittent every 12 hours    MEDICATIONS  (PRN):  bisacodyl Suppository 10 milliGRAM(s) Rectal daily PRN Constipation  sodium chloride 0.9% lock flush 10 milliLiter(s) IV Push every 1 hour PRN Pre/post blood products, medications, blood draw, and to maintain line patency      ALLERGIES:  Allergies    No Known Allergies    Intolerances        LABS:                        9.7    36.30 )-----------( 120      ( 06 Jul 2019 04:50 )             31.6     07-06    142  |  100  |  25<H>  ----------------------------<  205<H>  4.3   |  30  |  0.78    Ca    8.9      06 Jul 2019 04:50  Phos  2.5     07-06  Mg     2.1     07-06    TPro  6.1  /  Alb  2.9<L>  /  TBili  0.3  /  DBili  x   /  AST  55<H>  /  ALT  81<H>  /  AlkPhos  75  07-06    PT/INR - ( 05 Jul 2019 20:20 )   PT: 13.1 SEC;   INR: 1.17          PTT - ( 06 Jul 2019 00:10 )  PTT:23.8 SEC      RADIOLOGY & ADDITIONAL TESTS: Reviewed. Peyman Aiken MD, PhD | PGY-2  Department of Internal Medicine  Pager [phone] ([facility]) / [phone] ([facility])      INTERVAL HPI/OVERNIGHT EVENTS:    SUBJECTIVE: Patient coded 8PM yesterday. ROSC achieved after several minutes. Bronchoscopy was performed to remove thick green mucus. Patient was started on Nimbex. Another code at 11PM (3 PEAs over 45 mins). 3-4 epi's and about 15 minutes of chest compression performed. ROSC achieved again. POCUS with RV enlargement. Respiratory acidosis (7.27) noted prior to these events. Patient seen and examined at bedside. Unresponsive to sternal rubs. ROS unable to assess due to sedation and intubation.    OBJECTIVE:    VITAL SIGNS:  ICU Vital Signs Last 24 Hrs  T(C): 36.2 (06 Jul 2019 04:00), Max: 36.9 (05 Jul 2019 12:00)  T(F): 97.1 (06 Jul 2019 04:00), Max: 98.4 (05 Jul 2019 12:00)  HR: 79 (06 Jul 2019 07:10) (64 - 116)  BP: --  BP(mean): --  ABP: 105/74 (06 Jul 2019 06:00) (64/42 - 149/98)  ABP(mean): 86 (06 Jul 2019 06:00) (49 - 110)  RR: 14 (06 Jul 2019 06:00) (7 - 34)  SpO2: 97% (06 Jul 2019 07:10) (73% - 100%)    Mode: AC/ CMV (Assist Control/ Continuous Mandatory Ventilation), RR (machine): 12, TV (machine): 380, FiO2: 30, PEEP: 5, MAP: 22, PIP: 48    07-05 @ 07:01  -  07-06 @ 07:00  --------------------------------------------------------  IN: 2714.6 mL / OUT: 2125 mL / NET: 589.6 mL      CAPILLARY BLOOD GLUCOSE          PHYSICAL EXAM:    General: NAD  HEENT: NC/AT; PERRL, clear conjunctiva  Neck: supple; L IJ triple lumen in site, nonbleeding and nonerythematous  Respiratory: Coarse breath sounds.   Cardiovascular: +S1/S2; RRR  Abdomen: soft, ND; +BS  Extremities: no LE edema  Skin: normal color and turgor; no rash  Neurological:    MEDICATIONS:  MEDICATIONS  (STANDING):  ALBUTerol/ipratropium for Nebulization 3 milliLiter(s) Nebulizer every 6 hours  alteplase    Bolus 50 milliGRAM(s) IV Bolus once  chlorhexidine 4% Liquid 1 Application(s) Topical <User Schedule>  cisatracurium Infusion 3 MICROgram(s)/kG/Min (15.012 mL/Hr) IV Continuous <Continuous>  dexmedetomidine Infusion 0.7 MICROgram(s)/kG/Hr (14.595 mL/Hr) IV Continuous <Continuous>  fentaNYL   Infusion. 0.5 MICROgram(s)/kG/Hr (4.17 mL/Hr) IV Continuous <Continuous>  heparin  Injectable 5000 Unit(s) SubCutaneous every 12 hours  meropenem  IVPB 1000 milliGRAM(s) IV Intermittent every 8 hours  methylPREDNISolone sodium succinate Injectable 40 milliGRAM(s) IV Push every 12 hours  midazolam Infusion 0.02 mG/kG/Hr (1.668 mL/Hr) IV Continuous <Continuous>  norepinephrine Infusion 0.05 MICROgram(s)/kG/Min (3.909 mL/Hr) IV Continuous <Continuous>  pantoprazole  Injectable 40 milliGRAM(s) IV Push daily  phenylephrine    Infusion 0.5 MICROgram(s)/kG/Min (15.637 mL/Hr) IV Continuous <Continuous>  propofol Infusion 40 MICROgram(s)/kG/Min (20.016 mL/Hr) IV Continuous <Continuous>  vancomycin  IVPB 1250 milliGRAM(s) IV Intermittent every 12 hours    MEDICATIONS  (PRN):  bisacodyl Suppository 10 milliGRAM(s) Rectal daily PRN Constipation  sodium chloride 0.9% lock flush 10 milliLiter(s) IV Push every 1 hour PRN Pre/post blood products, medications, blood draw, and to maintain line patency      ALLERGIES:  Allergies    No Known Allergies    Intolerances        LABS:                        9.7    36.30 )-----------( 120      ( 06 Jul 2019 04:50 )             31.6     07-06    142  |  100  |  25<H>  ----------------------------<  205<H>  4.3   |  30  |  0.78    Ca    8.9      06 Jul 2019 04:50  Phos  2.5     07-06  Mg     2.1     07-06    TPro  6.1  /  Alb  2.9<L>  /  TBili  0.3  /  DBili  x   /  AST  55<H>  /  ALT  81<H>  /  AlkPhos  75  07-06    PT/INR - ( 05 Jul 2019 20:20 )   PT: 13.1 SEC;   INR: 1.17          PTT - ( 06 Jul 2019 00:10 )  PTT:23.8 SEC      RADIOLOGY & ADDITIONAL TESTS: Reviewed.

## 2019-07-06 NOTE — PROGRESS NOTE ADULT - ASSESSMENT
ASSESSMENT AND PLAN:  48-yo M w/ PMH of sarcoidosis, pulmonary fibrosis, aspergilloma s/p DAVID lobectomy, transferred to [facility] CTICU from [facility] for trach stoma bleeding, c/b Candida auris skin colonization, tranferred to MICU for further management.    #Neuro:  - Intubated, mildly sedated  - Alert and awake    #CV:  - On Eldon 0.3. /74 this AM.  - Titrate as needed  - Central line inserted (L IJ)    #Pulm:  - Hypoxic respiratory failure requiring intubation (7/2)  - Failed CPAP trial.  - Will try Diamox for high bicarb. Patient's bicarb was 30s at baseline. Currently with metabolic alkalosis.      #ID:  - Candida auris infection  - BCx NGTD 6/30, 7/2.   - Bronch Cx with GPC and GNR  - On cici/Vanc  - ID on board. Appreciate recs.  - Will broaden antibiotics for fungal coverage if patient deteriorates.  - Awaiting sensitivity    #Heme:  - Hgb appears at baseline ~10  - Continue to monitor at this time  - Monitor for bleeding and H/H    #Endo:  - FS ~150s  - A1C 5.4  - FS Q6H with tube feeds    #Rheum:  - Patient is on Solumedrol 40 mg IV Q8H  - Will titrate to 40 mg IV BID today. Will continue to titrate down    #GI:  - NPO with tube feeds    #Renal:  - No acute issues identified    #GOC:  - Full Code  - Contact precaution (Candida auris)    #DVT PPx:  - HSQ. 48-yo M w/ PMH of sarcoidosis, pulmonary fibrosis, aspergilloma s/p DAVID lobectomy, transferred to [facility] CTICU from [facility] for trach stoma bleeding, c/b Candida auris skin colonization, tranferred to MICU for further management.    #Neuro:  - Intubated, mildly sedated  - Alert and awake    #CV:  - Coded x2 yesterday. Currently on Eldon 1.2 and Levo 0.1, /74 this AM.  - Titrate as needed  - Central line inserted (L IJ)    #Pulm:  - Hypoxic respiratory failure requiring intubation (7/2)  - Failed CPAP trial.  - Will try Diamox for high bicarb. Patient's bicarb was 30s at baseline. Currently with metabolic alkalosis.    #ID:  - Candida auris colonization  - BCx NGTD 6/30, 7/2.   - Bronch Cx with GPC and GNR  - On cici/Vanc  - ID on board. Appreciate recs.  - Will broaden antibiotics for fungal coverage if patient deteriorates.  - Awaiting sensitivity    #Heme:  - Hgb appears at baseline ~10  - Continue to monitor at this time  - Monitor for bleeding and H/H    #Endo:  - FS ~150s  - A1C 5.4  - FS Q6H with tube feeds    #Rheum:  - Patient is on Solumedrol 40 mg IV Q8H  - Will titrate to 40 mg IV BID today. Will continue to titrate down    #GI:  - NPO with tube feeds    #Renal:  - No acute issues identified    #GOC:  - Full Code  - Contact precaution (Candida auris)    #DVT PPx:  - HSQ. 48-yo M w/ PMH of sarcoidosis, pulmonary fibrosis, aspergilloma s/p DAVID lobectomy, transferred to [facility] CTICU from [facility] for trach stoma bleeding, c/b Candida auris skin colonization, tranferred to MICU for further management.    #Neuro:  - Intubated, sedated. Currently on mida, Nimbex, prop, fent.  - Does not respond to sternal rub.    #CV:  - Coded x2 yesterday. Currently on Eldon 1.2 and Levo 0.1, /74 this AM.  - Titrate as needed  - Central line inserted (L IJ)    #Pulm:  - Hypercapnic respiratory failure requiring intubation (7/2)  - Failed CPAP trial.  - Diamox tried yesterday. Currently bicarb mid 30s. In the setting of s/p code, will not continue.    #ID:  - Candida auris colonization  - BCx NGTD 6/30, 7/2.   - Bronch Cx with GPC and Pseudomonas.  - On cici/Vanc  - ID on board. Appreciate recs.    #Heme:  - Hgb appears at baseline ~10  - Continue to monitor at this time  - Monitor for bleeding and H/H    #Endo:  - FS ~150s  - A1C 5.4  - FS Q6H with tube feeds    #Rheum:  - Patient is on Solumedrol 40 mg IV Q8H  - Will titrate to 40 mg IV BID today. Will continue to titrate down    #GI:  - NPO with tube feeds    #Renal:  - No acute issues identified    #GOC:  - Full Code  - Contact precaution (Candida auris)    #DVT PPx:  - HSQ. 48-yo M w/ PMH of sarcoidosis, pulmonary fibrosis, aspergilloma s/p DAVID lobectomy, transferred to [facility] CTICU from [facility] for trach stoma bleeding, c/b Candida auris skin colonization, tranferred to MICU for further management.    #Neuro:  - Intubated, sedated. Currently on mida, Nimbex, prop, fent.  - Does not respond to sternal rub. Medically paralyzed s/p PEA arrest.    #CV:  - Coded x2 yesterday. Currently on Eldon 1.2 and Levo 0.1, /74 this AM.  - Maintain to maintain hemodynamic stability.  - Central line inserted (L IJ)    #Pulm:  - Hypercapnic respiratory failure requiring intubation (7/2)  - Failed CPAP trial.  - Diamox tried yesterday. Currently bicarb mid 30s. In the setting of s/p code, will not continue.    #ID:  - Candida auris colonization  - BCx NGTD 6/30, 7/2.   - Bronch Cx with GPC and Pseudomonas.  - On cici/Vanc  - ID on board. Appreciate recs.    #Heme:  - Hgb appears at baseline ~10  - Continue to monitor at this time  - Monitor for bleeding and H/H    #Endo:  - FS ~150s  - A1C 5.4  - FS Q6H with tube feeds    #Rheum:  - Patient is on Solumedrol 40 mg IV Q8H  - Will titrate to 40 mg IV BID today. Will continue to titrate down    #GI:  - NPO with tube feeds    #Renal:  - No acute issues identified    #GOC:  - Full Code  - Contact precaution (Candida auris)    #DVT PPx:  - HSQ. 48-yo M w/ PMH of sarcoidosis, pulmonary fibrosis, aspergilloma s/p DAVID lobectomy, transferred to [facility] CTICU from [facility] for trach stoma bleeding, c/b Candida auris skin colonization, tranferred to MICU for further management.    #Neuro:  - Intubated, sedated. Currently on mida, Nimbex, prop, fent.  - Does not respond to sternal rub. Medically paralyzed s/p PEA arrest.    #CV:  - Coded x2 yesterday. Currently on Eldon 1.2 and Levo 0.1, /74 this AM.  - Maintain to maintain hemodynamic stability.  - Central line inserted (L IJ)    #Pulm:  - Hypercapnic respiratory failure requiring intubation (7/2)  - Failed CPAP trial.  - Diamox tried yesterday. Currently bicarb mid 30s. In the setting of s/p code, will not continue.    #ID:  - Candida auris colonization  - BCx NGTD 6/30, 7/2.   - Bronch Cx with GPC and Pseudomonas. 7/5 Bronch Cx GNR.  - On cici/Vanc  - ID on board. Appreciate recs.    #Heme:  - Hgb appears at baseline ~10  - Continue to monitor at this time  - Monitor for bleeding and H/H    #Endo:  - FS ~150s  - A1C 5.4  - FS Q6H with tube feeds    #Rheum:  - Patient is on Solumedrol 40 mg IV Q8H  - Continue at this time.    #GI:  - NPO with tube feeds    #Renal:  - No acute issues identified    #GOC:  - Full Code  - Contact precaution (Candida auris)    #DVT PPx:  - HSQ.

## 2019-07-07 VITALS — HEART RATE: 141 BPM | RESPIRATION RATE: 25 BRPM | OXYGEN SATURATION: 70 %

## 2019-07-07 LAB
ALBUMIN SERPL ELPH-MCNC: 2.8 G/DL — LOW (ref 3.3–5)
ALBUMIN SERPL ELPH-MCNC: 3.1 G/DL — LOW (ref 3.3–5)
ALP SERPL-CCNC: 70 U/L — SIGNIFICANT CHANGE UP (ref 40–120)
ALP SERPL-CCNC: 85 U/L — SIGNIFICANT CHANGE UP (ref 40–120)
ALT FLD-CCNC: 56 U/L — HIGH (ref 4–41)
ALT FLD-CCNC: 62 U/L — HIGH (ref 4–41)
ANION GAP SERPL CALC-SCNC: 5 MMO/L — LOW (ref 7–14)
ANION GAP SERPL CALC-SCNC: 7 MMO/L — SIGNIFICANT CHANGE UP (ref 7–14)
ANISOCYTOSIS BLD QL: SLIGHT — SIGNIFICANT CHANGE UP
APTT BLD: 29 SEC — SIGNIFICANT CHANGE UP (ref 27.5–36.3)
AST SERPL-CCNC: 20 U/L — SIGNIFICANT CHANGE UP (ref 4–40)
AST SERPL-CCNC: 28 U/L — SIGNIFICANT CHANGE UP (ref 4–40)
BACTERIA BLD CULT: SIGNIFICANT CHANGE UP
BASE EXCESS BLDA CALC-SCNC: 6 MMOL/L — SIGNIFICANT CHANGE UP
BASE EXCESS BLDA CALC-SCNC: 6.3 MMOL/L — SIGNIFICANT CHANGE UP
BASE EXCESS BLDA CALC-SCNC: 6.5 MMOL/L — SIGNIFICANT CHANGE UP
BASE EXCESS BLDA CALC-SCNC: 9.4 MMOL/L — SIGNIFICANT CHANGE UP
BASE EXCESS BLDA CALC-SCNC: 9.6 MMOL/L — SIGNIFICANT CHANGE UP
BASO STIPL BLD QL SMEAR: PRESENT — SIGNIFICANT CHANGE UP
BASOPHILS # BLD AUTO: 0.05 K/UL — SIGNIFICANT CHANGE UP (ref 0–0.2)
BASOPHILS # BLD AUTO: 0.05 K/UL — SIGNIFICANT CHANGE UP (ref 0–0.2)
BASOPHILS NFR BLD AUTO: 0.1 % — SIGNIFICANT CHANGE UP (ref 0–2)
BASOPHILS NFR BLD AUTO: 0.1 % — SIGNIFICANT CHANGE UP (ref 0–2)
BASOPHILS NFR SPEC: 0 % — SIGNIFICANT CHANGE UP (ref 0–2)
BILIRUB SERPL-MCNC: 0.2 MG/DL — SIGNIFICANT CHANGE UP (ref 0.2–1.2)
BILIRUB SERPL-MCNC: < 0.2 MG/DL — LOW (ref 0.2–1.2)
BLASTS # FLD: 0 % — SIGNIFICANT CHANGE UP (ref 0–0)
BLD GP AB SCN SERPL QL: NEGATIVE — SIGNIFICANT CHANGE UP
BLOOD GAS ARTERIAL - FIO2: 100 — SIGNIFICANT CHANGE UP
BUN SERPL-MCNC: 21 MG/DL — SIGNIFICANT CHANGE UP (ref 7–23)
BUN SERPL-MCNC: 22 MG/DL — SIGNIFICANT CHANGE UP (ref 7–23)
CA-I BLDA-SCNC: 1.24 MMOL/L — SIGNIFICANT CHANGE UP (ref 1.15–1.29)
CA-I BLDA-SCNC: 1.29 MMOL/L — SIGNIFICANT CHANGE UP (ref 1.15–1.29)
CA-I BLDA-SCNC: 1.33 MMOL/L — HIGH (ref 1.15–1.29)
CA-I BLDA-SCNC: 1.37 MMOL/L — HIGH (ref 1.15–1.29)
CALCIUM SERPL-MCNC: 8.9 MG/DL — SIGNIFICANT CHANGE UP (ref 8.4–10.5)
CALCIUM SERPL-MCNC: 9.5 MG/DL — SIGNIFICANT CHANGE UP (ref 8.4–10.5)
CHLORIDE SERPL-SCNC: 100 MMOL/L — SIGNIFICANT CHANGE UP (ref 98–107)
CHLORIDE SERPL-SCNC: 100 MMOL/L — SIGNIFICANT CHANGE UP (ref 98–107)
CO2 SERPL-SCNC: 32 MMOL/L — HIGH (ref 22–31)
CO2 SERPL-SCNC: 35 MMOL/L — HIGH (ref 22–31)
CREAT SERPL-MCNC: 0.81 MG/DL — SIGNIFICANT CHANGE UP (ref 0.5–1.3)
CREAT SERPL-MCNC: 0.95 MG/DL — SIGNIFICANT CHANGE UP (ref 0.5–1.3)
ELLIPTOCYTES BLD QL SMEAR: SLIGHT — SIGNIFICANT CHANGE UP
EOSINOPHIL # BLD AUTO: 0.12 K/UL — SIGNIFICANT CHANGE UP (ref 0–0.5)
EOSINOPHIL # BLD AUTO: 0.14 K/UL — SIGNIFICANT CHANGE UP (ref 0–0.5)
EOSINOPHIL NFR BLD AUTO: 0.3 % — SIGNIFICANT CHANGE UP (ref 0–6)
EOSINOPHIL NFR BLD AUTO: 0.4 % — SIGNIFICANT CHANGE UP (ref 0–6)
EOSINOPHIL NFR FLD: 0 % — SIGNIFICANT CHANGE UP (ref 0–6)
GAS PNL BLDV: 142 MMOL/L — SIGNIFICANT CHANGE UP (ref 136–146)
GIANT PLATELETS BLD QL SMEAR: PRESENT — SIGNIFICANT CHANGE UP
GLUCOSE BLDA-MCNC: 201 MG/DL — HIGH (ref 70–99)
GLUCOSE BLDA-MCNC: 202 MG/DL — HIGH (ref 70–99)
GLUCOSE BLDA-MCNC: 210 MG/DL — HIGH (ref 70–99)
GLUCOSE BLDA-MCNC: 215 MG/DL — HIGH (ref 70–99)
GLUCOSE BLDA-MCNC: 216 MG/DL — HIGH (ref 70–99)
GLUCOSE BLDA-MCNC: 227 MG/DL — HIGH (ref 70–99)
GLUCOSE BLDV-MCNC: 201 MG/DL — HIGH (ref 70–99)
GLUCOSE SERPL-MCNC: 234 MG/DL — HIGH (ref 70–99)
GLUCOSE SERPL-MCNC: 235 MG/DL — HIGH (ref 70–99)
HCO3 BLDA-SCNC: 26 MMOL/L — SIGNIFICANT CHANGE UP (ref 22–26)
HCO3 BLDA-SCNC: 26 MMOL/L — SIGNIFICANT CHANGE UP (ref 22–26)
HCO3 BLDA-SCNC: 27 MMOL/L — HIGH (ref 22–26)
HCO3 BLDA-SCNC: 32 MMOL/L — HIGH (ref 22–26)
HCO3 BLDA-SCNC: 32 MMOL/L — HIGH (ref 22–26)
HCT VFR BLD CALC: 31.9 % — LOW (ref 39–50)
HCT VFR BLD CALC: 36.8 % — LOW (ref 39–50)
HCT VFR BLDA CALC: 29.4 % — LOW (ref 39–51)
HCT VFR BLDA CALC: 30 % — LOW (ref 39–51)
HCT VFR BLDA CALC: 32.4 % — LOW (ref 39–51)
HCT VFR BLDA CALC: 32.5 % — LOW (ref 39–51)
HCT VFR BLDA CALC: 33.5 % — LOW (ref 39–51)
HCT VFR BLDA CALC: 35 % — LOW (ref 39–51)
HCT VFR BLDV CALC: 33.4 % — LOW (ref 39–51)
HGB BLD-MCNC: 10.3 G/DL — LOW (ref 13–17)
HGB BLD-MCNC: 9.5 G/DL — LOW (ref 13–17)
HGB BLDA-MCNC: 10.5 G/DL — LOW (ref 13–17)
HGB BLDA-MCNC: 10.5 G/DL — LOW (ref 13–17)
HGB BLDA-MCNC: 10.9 G/DL — LOW (ref 13–17)
HGB BLDA-MCNC: 11.4 G/DL — LOW (ref 13–17)
HGB BLDA-MCNC: 9.5 G/DL — LOW (ref 13–17)
HGB BLDA-MCNC: 9.7 G/DL — LOW (ref 13–17)
HGB BLDV-MCNC: 10.9 G/DL — LOW (ref 13–17)
IMM GRANULOCYTES NFR BLD AUTO: 1.1 % — SIGNIFICANT CHANGE UP (ref 0–1.5)
IMM GRANULOCYTES NFR BLD AUTO: 1.5 % — SIGNIFICANT CHANGE UP (ref 0–1.5)
INR BLD: 1.12 — SIGNIFICANT CHANGE UP (ref 0.88–1.17)
LACTATE BLDA-SCNC: 0.6 MMOL/L — SIGNIFICANT CHANGE UP (ref 0.5–2)
LACTATE BLDA-SCNC: 0.7 MMOL/L — SIGNIFICANT CHANGE UP (ref 0.5–2)
LACTATE BLDA-SCNC: 1.1 MMOL/L — SIGNIFICANT CHANGE UP (ref 0.5–2)
LACTATE BLDA-SCNC: 1.2 MMOL/L — SIGNIFICANT CHANGE UP (ref 0.5–2)
LYMPHOCYTES # BLD AUTO: 0.51 K/UL — LOW (ref 1–3.3)
LYMPHOCYTES # BLD AUTO: 1.4 % — LOW (ref 13–44)
LYMPHOCYTES # BLD AUTO: 3.03 K/UL — SIGNIFICANT CHANGE UP (ref 1–3.3)
LYMPHOCYTES # BLD AUTO: 6.6 % — LOW (ref 13–44)
LYMPHOCYTES NFR SPEC AUTO: 4.5 % — LOW (ref 13–44)
MACROCYTES BLD QL: SLIGHT — SIGNIFICANT CHANGE UP
MAGNESIUM SERPL-MCNC: 2.2 MG/DL — SIGNIFICANT CHANGE UP (ref 1.6–2.6)
MAGNESIUM SERPL-MCNC: 2.6 MG/DL — SIGNIFICANT CHANGE UP (ref 1.6–2.6)
MANUAL SMEAR VERIFICATION: SIGNIFICANT CHANGE UP
MCHC RBC-ENTMCNC: 26.8 PG — LOW (ref 27–34)
MCHC RBC-ENTMCNC: 27.5 PG — SIGNIFICANT CHANGE UP (ref 27–34)
MCHC RBC-ENTMCNC: 28 % — LOW (ref 32–36)
MCHC RBC-ENTMCNC: 29.8 % — LOW (ref 32–36)
MCV RBC AUTO: 92.2 FL — SIGNIFICANT CHANGE UP (ref 80–100)
MCV RBC AUTO: 95.8 FL — SIGNIFICANT CHANGE UP (ref 80–100)
METAMYELOCYTES # FLD: 0 % — SIGNIFICANT CHANGE UP (ref 0–1)
MICROCYTES BLD QL: SLIGHT — SIGNIFICANT CHANGE UP
MONOCYTES # BLD AUTO: 2.62 K/UL — HIGH (ref 0–0.9)
MONOCYTES # BLD AUTO: 4.36 K/UL — HIGH (ref 0–0.9)
MONOCYTES NFR BLD AUTO: 7 % — SIGNIFICANT CHANGE UP (ref 2–14)
MONOCYTES NFR BLD AUTO: 9.5 % — SIGNIFICANT CHANGE UP (ref 2–14)
MONOCYTES NFR BLD: 9 % — SIGNIFICANT CHANGE UP (ref 2–9)
MYELOCYTES NFR BLD: 0 % — SIGNIFICANT CHANGE UP (ref 0–0)
NEUTROPHIL AB SER-ACNC: 86.5 % — HIGH (ref 43–77)
NEUTROPHILS # BLD AUTO: 33.62 K/UL — HIGH (ref 1.8–7.4)
NEUTROPHILS # BLD AUTO: 37.81 K/UL — HIGH (ref 1.8–7.4)
NEUTROPHILS NFR BLD AUTO: 82 % — HIGH (ref 43–77)
NEUTROPHILS NFR BLD AUTO: 90 % — HIGH (ref 43–77)
NEUTS BAND # BLD: 0 % — SIGNIFICANT CHANGE UP (ref 0–6)
NRBC # FLD: 0 K/UL — SIGNIFICANT CHANGE UP (ref 0–0)
NRBC # FLD: 0 K/UL — SIGNIFICANT CHANGE UP (ref 0–0)
OTHER - HEMATOLOGY %: 0 — SIGNIFICANT CHANGE UP
PCO2 BLDA: 105 MMHG — CRITICAL HIGH (ref 35–48)
PCO2 BLDA: 72 MMHG — CRITICAL HIGH (ref 35–48)
PCO2 BLDA: 77 MMHG — CRITICAL HIGH (ref 35–48)
PCO2 BLDA: > 110 MMHG — CRITICAL HIGH (ref 35–48)
PCO2 BLDA: > 110 MMHG — CRITICAL HIGH (ref 35–48)
PH BLDA: 6.96 PH — CRITICAL LOW (ref 7.35–7.45)
PH BLDA: 7 PH — CRITICAL LOW (ref 7.35–7.45)
PH BLDA: 7.01 PH — CRITICAL LOW (ref 7.35–7.45)
PH BLDA: 7.15 PH — CRITICAL LOW (ref 7.35–7.45)
PH BLDA: 7.3 PH — LOW (ref 7.35–7.45)
PH BLDA: 7.32 PH — LOW (ref 7.35–7.45)
PH BLDV: 7.03 PH — CRITICAL LOW (ref 7.32–7.43)
PHOSPHATE SERPL-MCNC: 2.6 MG/DL — SIGNIFICANT CHANGE UP (ref 2.5–4.5)
PHOSPHATE SERPL-MCNC: 4.7 MG/DL — HIGH (ref 2.5–4.5)
PLATELET # BLD AUTO: 109 K/UL — LOW (ref 150–400)
PLATELET # BLD AUTO: 121 K/UL — LOW (ref 150–400)
PLATELET COUNT - ESTIMATE: SIGNIFICANT CHANGE UP
PMV BLD: 13.4 FL — HIGH (ref 7–13)
PMV BLD: SIGNIFICANT CHANGE UP FL (ref 7–13)
PO2 BLDA: 122 MMHG — HIGH (ref 83–108)
PO2 BLDA: 157 MMHG — HIGH (ref 83–108)
PO2 BLDA: 166 MMHG — HIGH (ref 83–108)
PO2 BLDA: 47 MMHG — CRITICAL LOW (ref 83–108)
PO2 BLDA: 49 MMHG — CRITICAL LOW (ref 83–108)
PO2 BLDA: 78 MMHG — LOW (ref 83–108)
PO2 BLDV: 41 MMHG — HIGH (ref 35–40)
POIKILOCYTOSIS BLD QL AUTO: SLIGHT — SIGNIFICANT CHANGE UP
POLYCHROMASIA BLD QL SMEAR: SIGNIFICANT CHANGE UP
POTASSIUM BLDA-SCNC: 4.7 MMOL/L — HIGH (ref 3.4–4.5)
POTASSIUM BLDA-SCNC: 4.7 MMOL/L — HIGH (ref 3.4–4.5)
POTASSIUM BLDA-SCNC: 5 MMOL/L — HIGH (ref 3.4–4.5)
POTASSIUM BLDA-SCNC: 5.1 MMOL/L — HIGH (ref 3.4–4.5)
POTASSIUM BLDA-SCNC: 5.3 MMOL/L — HIGH (ref 3.4–4.5)
POTASSIUM BLDA-SCNC: 5.3 MMOL/L — HIGH (ref 3.4–4.5)
POTASSIUM BLDV-SCNC: 5.4 MMOL/L — HIGH (ref 3.4–4.5)
POTASSIUM SERPL-MCNC: 5.1 MMOL/L — SIGNIFICANT CHANGE UP (ref 3.5–5.3)
POTASSIUM SERPL-MCNC: 5.4 MMOL/L — HIGH (ref 3.5–5.3)
POTASSIUM SERPL-SCNC: 5.1 MMOL/L — SIGNIFICANT CHANGE UP (ref 3.5–5.3)
POTASSIUM SERPL-SCNC: 5.4 MMOL/L — HIGH (ref 3.5–5.3)
PROMYELOCYTES # FLD: 0 % — SIGNIFICANT CHANGE UP (ref 0–0)
PROT SERPL-MCNC: 6.1 G/DL — SIGNIFICANT CHANGE UP (ref 6–8.3)
PROT SERPL-MCNC: 6.7 G/DL — SIGNIFICANT CHANGE UP (ref 6–8.3)
PROTHROM AB SERPL-ACNC: 12.5 SEC — SIGNIFICANT CHANGE UP (ref 9.8–13.1)
RBC # BLD: 3.46 M/UL — LOW (ref 4.2–5.8)
RBC # BLD: 3.84 M/UL — LOW (ref 4.2–5.8)
RBC # FLD: 15.9 % — HIGH (ref 10.3–14.5)
RBC # FLD: 16.1 % — HIGH (ref 10.3–14.5)
RH IG SCN BLD-IMP: POSITIVE — SIGNIFICANT CHANGE UP
SAO2 % BLDA: 66.3 % — LOW (ref 95–99)
SAO2 % BLDA: 72.9 % — LOW (ref 95–99)
SAO2 % BLDA: 88.5 % — LOW (ref 95–99)
SAO2 % BLDA: 97.6 % — SIGNIFICANT CHANGE UP (ref 95–99)
SAO2 % BLDA: 98.3 % — SIGNIFICANT CHANGE UP (ref 95–99)
SAO2 % BLDA: 99.2 % — HIGH (ref 95–99)
SAO2 % BLDV: 54.9 % — LOW (ref 60–85)
SMUDGE CELLS # BLD: PRESENT — SIGNIFICANT CHANGE UP
SODIUM BLDA-SCNC: 134 MMOL/L — LOW (ref 136–146)
SODIUM BLDA-SCNC: 135 MMOL/L — LOW (ref 136–146)
SODIUM BLDA-SCNC: 136 MMOL/L — SIGNIFICANT CHANGE UP (ref 136–146)
SODIUM BLDA-SCNC: 137 MMOL/L — SIGNIFICANT CHANGE UP (ref 136–146)
SODIUM BLDA-SCNC: 137 MMOL/L — SIGNIFICANT CHANGE UP (ref 136–146)
SODIUM BLDA-SCNC: 143 MMOL/L — SIGNIFICANT CHANGE UP (ref 136–146)
SODIUM SERPL-SCNC: 139 MMOL/L — SIGNIFICANT CHANGE UP (ref 135–145)
SODIUM SERPL-SCNC: 140 MMOL/L — SIGNIFICANT CHANGE UP (ref 135–145)
STOMATOCYTES BLD QL SMEAR: SLIGHT — SIGNIFICANT CHANGE UP
TARGETS BLD QL SMEAR: SLIGHT — SIGNIFICANT CHANGE UP
VARIANT LYMPHS # BLD: 0 % — SIGNIFICANT CHANGE UP
WBC # BLD: 37.34 K/UL — HIGH (ref 3.8–10.5)
WBC # BLD: 46.07 K/UL — CRITICAL HIGH (ref 3.8–10.5)
WBC # FLD AUTO: 37.34 K/UL — HIGH (ref 3.8–10.5)
WBC # FLD AUTO: 46.07 K/UL — CRITICAL HIGH (ref 3.8–10.5)

## 2019-07-07 PROCEDURE — 99291 CRITICAL CARE FIRST HOUR: CPT | Mod: 25

## 2019-07-07 PROCEDURE — 92950 HEART/LUNG RESUSCITATION CPR: CPT

## 2019-07-07 PROCEDURE — 99233 SBSQ HOSP IP/OBS HIGH 50: CPT

## 2019-07-07 PROCEDURE — 71045 X-RAY EXAM CHEST 1 VIEW: CPT | Mod: 26

## 2019-07-07 PROCEDURE — 99292 CRITICAL CARE ADDL 30 MIN: CPT | Mod: 25

## 2019-07-07 RX ORDER — TOBRAMYCIN SULFATE 40 MG/ML
400 VIAL (ML) INJECTION DAILY
Refills: 0 | Status: DISCONTINUED | OUTPATIENT
Start: 2019-07-07 | End: 2019-07-07

## 2019-07-07 RX ORDER — VASOPRESSIN 20 [USP'U]/ML
0.02 INJECTION INTRAVENOUS
Qty: 50 | Refills: 0 | Status: DISCONTINUED | OUTPATIENT
Start: 2019-07-07 | End: 2019-07-07

## 2019-07-07 RX ORDER — TOBRAMYCIN SULFATE 40 MG/ML
VIAL (ML) INJECTION
Refills: 0 | Status: DISCONTINUED | OUTPATIENT
Start: 2019-07-07 | End: 2019-07-07

## 2019-07-07 RX ORDER — CALCIUM GLUCONATE 100 MG/ML
2 VIAL (ML) INTRAVENOUS ONCE
Refills: 0 | Status: COMPLETED | OUTPATIENT
Start: 2019-07-07 | End: 2019-07-07

## 2019-07-07 RX ADMIN — MEROPENEM 100 MILLIGRAM(S): 1 INJECTION INTRAVENOUS at 05:30

## 2019-07-07 RX ADMIN — PROPOFOL 20.02 MICROGRAM(S)/KG/MIN: 10 INJECTION, EMULSION INTRAVENOUS at 10:36

## 2019-07-07 RX ADMIN — Medication 220 MILLIGRAM(S): at 10:35

## 2019-07-07 RX ADMIN — Medication 40 MILLIGRAM(S): at 05:30

## 2019-07-07 RX ADMIN — VASOPRESSIN 1.2 UNIT(S)/MIN: 20 INJECTION INTRAVENOUS at 10:36

## 2019-07-07 RX ADMIN — Medication 200 GRAM(S): at 04:32

## 2019-07-07 RX ADMIN — Medication 166.67 MILLIGRAM(S): at 11:28

## 2019-07-07 RX ADMIN — CHLORHEXIDINE GLUCONATE 1 APPLICATION(S): 213 SOLUTION TOPICAL at 05:30

## 2019-07-07 RX ADMIN — Medication 3 MILLILITER(S): at 04:18

## 2019-07-07 RX ADMIN — PANTOPRAZOLE SODIUM 40 MILLIGRAM(S): 20 TABLET, DELAYED RELEASE ORAL at 11:05

## 2019-07-07 RX ADMIN — MIDAZOLAM HYDROCHLORIDE 1.67 MG/KG/HR: 1 INJECTION, SOLUTION INTRAMUSCULAR; INTRAVENOUS at 10:36

## 2019-07-07 RX ADMIN — Medication 166.67 MILLIGRAM(S): at 01:09

## 2019-07-07 RX ADMIN — FENTANYL CITRATE 4.17 MICROGRAM(S)/KG/HR: 50 INJECTION INTRAVENOUS at 10:35

## 2019-07-07 RX ADMIN — Medication 3.91 MICROGRAM(S)/KG/MIN: at 10:36

## 2019-07-07 RX ADMIN — CISATRACURIUM BESYLATE 15.01 MICROGRAM(S)/KG/MIN: 2 INJECTION INTRAVENOUS at 10:36

## 2019-07-07 RX ADMIN — CHLORHEXIDINE GLUCONATE 15 MILLILITER(S): 213 SOLUTION TOPICAL at 07:54

## 2019-07-07 RX ADMIN — HEPARIN SODIUM 5000 UNIT(S): 5000 INJECTION INTRAVENOUS; SUBCUTANEOUS at 05:30

## 2019-07-07 RX ADMIN — PHENYLEPHRINE HYDROCHLORIDE 15.64 MICROGRAM(S)/KG/MIN: 10 INJECTION INTRAVENOUS at 10:36

## 2019-07-07 NOTE — PROGRESS NOTE ADULT - SUBJECTIVE AND OBJECTIVE BOX
Follow Up:  pneumonia    Interval History/ROS:  s/p PEA arrest several times. bronched twice.  hemoptysis.  currently on multiple pressors on  the vent with desaturations.  ROS Otherwise unable as he is sedated on the vent      Allergies  No Known Allergies    ANTIMICROBIALS:    meropenem  IVPB 1000 every 8 hours  vancomycin  IVPB 1250 every 12 hours    MEDICATIONS  (STANDING):  ALBUTerol/ipratropium for Nebulization 3 every 6 hours  cisatracurium Infusion 3 <Continuous>  dexmedetomidine Infusion 0.7 <Continuous>  fentaNYL   Infusion. 0.5 <Continuous>  heparin  Injectable 5000 every 12 hours  methylPREDNISolone sodium succinate Injectable 40 every 12 hours  midazolam Infusion 0.02 <Continuous>  norepinephrine Infusion 0.05 <Continuous>  pantoprazole  Injectable 40 daily  phenylephrine    Infusion 0.5 <Continuous>  propofol Infusion 40 <Continuous>    Vital Signs Last 24 Hrs  T(F): 99.4 (07-07-19 @ 04:00), Max: 100 (07-07-19 @ 00:00)  HR: 130 (07-07-19 @ 07:31)  RR: 15 (07-07-19 @ 06:00)  SpO2: 100% (07-07-19 @ 07:31) (91% - 100%)    PHYSICAL EXAM:  General: intubated  HEAD/EYES: eyes closed  ENT:  orally intubated ambu bagged  Cardiovascular:  S1, S2 tachy  Respiratory:  wheezing  GI:   soft, normal bowel sounds  Musculoskeletal:  no obvious synovitis  Neurologic:  sedated  Skin:  no rash  Psychiatric:  sedated, not able to assess  Lines:  Sline R wrist    WBC Count: 46.07 (07-07-19 @ 07:30)  WBC Count: 37.34 (07-07-19 @ 03:25)  WBC Count: 36.30 (07-06-19 @ 04:50)  WBC Count: 33.65 (07-06-19 @ 00:10)  WBC Count: 33.66 (07-05-19 @ 20:20)  WBC Count: 22.80 (07-05-19 @ 03:00)  WBC Count: 16.45 (07-04-19 @ 04:30)  WBC Count: 19.29 (07-03-19 @ 03:10)  WBC Count: 13.75 (07-02-19 @ 07:40)  WBC Count: 14.69 (07-01-19 @ 05:00)                        10.3   46.07 )-----------( 121      ( 07 Jul 2019 07:30 )             36.8 07-07    140  |  100  |  22  ----------------------------<  235  5.4   |  35  |  0.95  Ca    9.5      07 Jul 2019 07:30Phos  4.7     07-07Mg     2.6     07-07  TPro  6.7  /  Alb  3.1  /  TBili  0.2  /  DBili  x   /  AST  28  /  ALT  62  /  AlkPhos  85  07-07    Blood Gas Arterial - (07.07.19 @ 07:30)    pH, Arterial: 6.96 pH    pCO2, Arterial: > 110 mmHg    pO2, Arterial: 166 mmHg    HCO3, Arterial: 26 mmol/L    Base Excess, Arterial: 6.3: BASE EXCESS: REFERENCE RANGE = 0 (+/-) 2 mmol/l mmol/L    Oxygen Saturation, Arterial: 97.6 %      MICROBIOLOGY:  Culture - Respiratory with Gram Stain (07.05.19 @ 22:19)    Gram Stain Sputum:   GNR^Gram Neg Rods  QUANTITY OF BACTERIA SEEN: FEW (2+)  WBC^White Blood Cells  QNTY CELLS IN GRAM STAIN: MODERATE (3+)    Specimen Source: BRONCHIAL LAVAGE    Culture - Blood (07.02.19 @ 21:25)    Culture - Blood:   NO ORGANISMS ISOLATED  NO ORGANISMS ISOLATED AT 96 HOURS    Specimen Source: BLOOD PERIPHERAL    Culture - Respiratory with Gram Stain (07.02.19 @ 12:24)    -  Aztreonam: I 16 FELIX    -  Amikacin: S <=8 FELIX    -  Ciprofloxacin: R >2 FELIX    -  Cefepime: I 16 FELIX    -  Gentamicin: S 4 FELIX    -  Ceftazidime: R >16 FELIX    -  Imipenem: S <=1 FELIX    -  Levofloxacin: R >4 FELIX    Culture - Respiratory:   Normal Respiratory Katherine Also Present  PSA^Pseudomonas aeruginosa  QUANTITY OF GROWTH: RARE    Culture - Respiratory:   QUANTITY OF GROWTH: RARE    -  Tobramycin: S <=2 FELIX    -  Piperacillin/Tazobactam: R >64 FELIX    -  Meropenem: S <=1 FELIX    Gram Stain Sputum:   GPCPR Gram Pos Cocci in Pairs  QUANTITY OF BACTERIA SEEN: RARE (1+)  WBC^White Blood Cells  QNTY CELLS IN GRAM STAIN: FEW (2+)    Specimen Source: BRONCHIAL LAVAGE    Organism Identification: Pseudomonas aeruginosa    Organism: Pseudomonas aeruginosa    Method Type: NEGATIVE FELIX 43      Culture - Respiratory with Gram Stain (04.28.19 @ 19:26)    Gram Stain Sputum:   GNR Gram Neg Rods  QUANTITY OF BACTERIA SEEN: RARE (1+)  GPCPR^Gram Pos Cocci in Pairs  QUANTITY OF BACTERIA SEEN: RARE (1+)  WBC^White Blood Cells  QNTY CELLS IN GRAM STAIN: RARE (1+)    -  Levofloxacin: R >4 FELIX    -  Imipenem: S <=1 FELIX    -  Ceftazidime: S 4 FELIX    -  Gentamicin: S <=1 FELIX    -  Cefepime: S 8 FELIX    -  Ciprofloxacin: R >2 FELIX    -  Amikacin: S <=8 FELIX    -  Aztreonam: S <=4 FELIX    Culture - Respiratory:   PSA^Pseudomonas aeruginosa  QUANTITY OF GROWTH: MODERATE    Culture - Respiratory:   Normal Respiratory Katherine Also Present    -  Meropenem: S <=1 FELIX    -  Piperacillin/Tazobactam: S    -  Tobramycin: S <=2 FELIX    Specimen Source: SPUTUM    Organism Identification: Pseudomonas aeruginosa    Organism: Pseudomonas aeruginosa  QUANTITY OF GROWTH: MODERATE    Method Type: NEGATIVE FELIX 43    RADIOLOGY:  Xray Chest 1 View- PORTABLE-Urgent (07.05.19 @ 21:04) >  IMPRESSION:  Redemonstrated severe bullous emphysematous changes left greater than right, with scattered bilateral lung scarring changes.     Blunted bilateral CP angles compatible with small pleural reactions.

## 2019-07-07 NOTE — PROGRESS NOTE ADULT - ASSESSMENT
48-yo M w/ PMH of sarcoidosis, pulmonary fibrosis, aspergilloma s/p DAVID lobectomy, transferred to [facility] CTICU from [facility] for trach stoma bleeding, c/b Candida auris skin colonization, tranferred to MICU for further management.    #Neuro:  - Intubated, sedated. Currently on mida, Nimbex, prop, fent.  - Does not respond to sternal rub. Medically paralyzed s/p PEA arrest.    #CV:  - Coded x2 yesterday. Currently on Eldon 1.2 and Levo 0.1, /74 this AM.  - Maintain to maintain hemodynamic stability.  - Central line inserted (L IJ)    #Pulm:  - Hypercapnic respiratory failure requiring intubation (7/2)  - Failed CPAP trial.  - Diamox tried yesterday. Currently bicarb mid 30s. In the setting of s/p code, will not continue.    #ID:  - Candida auris colonization  - BCx NGTD 6/30, 7/2.   - Bronch Cx with GPC and Pseudomonas. 7/5 Bronch Cx GNR.  - On cici/Vanc  - ID on board. Appreciate recs.    #Heme:  - Hgb appears at baseline ~10  - Continue to monitor at this time  - Monitor for bleeding and H/H    #Endo:  - FS ~150s  - A1C 5.4  - FS Q6H with tube feeds    #Rheum:  - Patient is on Solumedrol 40 mg IV Q8H  - Continue at this time.    #GI:  - NPO with tube feeds    #Renal:  - No acute issues identified    #GOC:  - Full Code  - Contact precaution (Candida auris)    #DVT PPx:  - HSQ. 48-yo M w/ PMH of sarcoidosis, pulmonary fibrosis, aspergilloma s/p DAVID lobectomy, transferred to [facility] CTICU from [facility] for trach stoma bleeding, c/b Candida auris skin colonization, tranferred to MICU for further management.    #Neuro:  - Intubated, sedated. Currently on mida, Nimbex, prop, fent.  - Does not respond to sternal rub. Medically paralyzed s/p PEA arrest.    #CV:  - Coded x4 today. S/p CPR, amiodarone and epi   - Unfortunately, pt passed away after he was made DNR    #Pulm:  - Switched to VDR today but patient was still hypoxic   - Transitioned to ventilator with pressure control       #ID:  - Candida auris colonization  - BCx NGTD 6/30, 7/2.   - Bronch Cx with GPC and Pseudomonas. 7/5 Bronch Cx GNR.  - On cici/Vanc  - ID on board. Appreciate recs.    #Heme:  - Hgb appears at baseline ~10      #Endo:  - FS ~150s  - A1C 5.4  - FS Q6H with tube feeds    #Rheum:  - Patient is on Solumedrol 40 mg IV Q8H  - Continue at this time.    #GI:  - NPO with tube feeds    #Renal:  - No acute issues identified    #GOC:  - Full Code  - Contact precaution (Candida auris)    #DVT PPx:  - HSQ.

## 2019-07-07 NOTE — PROGRESS NOTE ADULT - ASSESSMENT
48M with sarcoidosis presents with recent episode of hemoptysis from stoma.  Tx for pneumonia at outside hospital.  Colonized with candida auris.  Critically ill with new hypercapneic respiratory failure.  Copious secretions with pseudomonas aeruginosa, hypoxic this morning, hemoptysis.  S/p PEA arrest 7/6.  Bronched earlier this morning.  Marked leukamoid reaction    Suggest:  - continue meropenem  - d/c vancomycin  - would add tobramycin iv 400mg  - continue contact isolation for C auris colonization    guarded prognosis    d/w [id number]

## 2019-07-07 NOTE — PROGRESS NOTE ADULT - PROVIDER SPECIALTY LIST ADULT
Critical Care
Gastroenterology
Gastroenterology
Infectious Disease
MICU
Infectious Disease

## 2019-07-07 NOTE — PROVIDER CONTACT NOTE (OTHER) - ACTION/TREATMENT ORDERED:
CPR, epi x2 & bicarb x2. ROSC achieved
ROSC achieved, labs sent
increase respiratory rate to 16 on vent
recheck ABG in 1 hr
patient placed on contact precautions, ANM and ADn and nurse educator also notified.

## 2019-07-07 NOTE — PROGRESS NOTE ADULT - SUBJECTIVE AND OBJECTIVE BOX
Patient is a 48y old  Male who presents with a chief complaint of Bleeding from tracheal stoma (06 Jul 2019 07:18)      SUBJECTIVE / OVERNIGHT EVENTS:          MEDICATIONS  (STANDING):  ALBUTerol/ipratropium for Nebulization 3 milliLiter(s) Nebulizer every 6 hours  chlorhexidine 4% Liquid 1 Application(s) Topical <User Schedule>  cisatracurium Infusion 3 MICROgram(s)/kG/Min (15.012 mL/Hr) IV Continuous <Continuous>  dexmedetomidine Infusion 0.7 MICROgram(s)/kG/Hr (14.595 mL/Hr) IV Continuous <Continuous>  fentaNYL   Infusion. 0.5 MICROgram(s)/kG/Hr (4.17 mL/Hr) IV Continuous <Continuous>  heparin  Injectable 5000 Unit(s) SubCutaneous every 12 hours  meropenem  IVPB 1000 milliGRAM(s) IV Intermittent every 8 hours  methylPREDNISolone sodium succinate Injectable 40 milliGRAM(s) IV Push every 12 hours  midazolam Infusion 0.02 mG/kG/Hr (1.668 mL/Hr) IV Continuous <Continuous>  norepinephrine Infusion 0.05 MICROgram(s)/kG/Min (3.909 mL/Hr) IV Continuous <Continuous>  pantoprazole  Injectable 40 milliGRAM(s) IV Push daily  phenylephrine    Infusion 0.5 MICROgram(s)/kG/Min (15.637 mL/Hr) IV Continuous <Continuous>  propofol Infusion 40 MICROgram(s)/kG/Min (20.016 mL/Hr) IV Continuous <Continuous>  vancomycin  IVPB 1250 milliGRAM(s) IV Intermittent every 12 hours    MEDICATIONS  (PRN):  bisacodyl Suppository 10 milliGRAM(s) Rectal daily PRN Constipation  sodium chloride 0.9% lock flush 10 milliLiter(s) IV Push every 1 hour PRN Pre/post blood products, medications, blood draw, and to maintain line patency      Vital Signs Last 24 Hrs  T(C): 37.4 (07 Jul 2019 04:00), Max: 37.8 (07 Jul 2019 00:00)  T(F): 99.4 (07 Jul 2019 04:00), Max: 100 (07 Jul 2019 00:00)  HR: 130 (07 Jul 2019 07:31) (62 - 130)  BP: --  BP(mean): --  RR: 15 (07 Jul 2019 06:00) (5 - 23)  SpO2: 100% (07 Jul 2019 07:31) (91% - 100%)      PHYSICAL EXAM  GENERAL: NAD, well-developed  HEAD:  Atraumatic, Normocephalic  EYES: EOMI, PERRLA, conjunctiva and sclera clear  NECK: Supple, No JVD  CHEST/LUNG: Clear to auscultation bilaterally; No wheeze  HEART: Regular rate and rhythm; No murmurs, rubs, or gallops  ABDOMEN: Soft, Nontender, Nondistended; Bowel sounds present  EXTREMITIES:  2+ Peripheral Pulses, No clubbing, cyanosis, or edema  PSYCH: AAOx3  SKIN: No rashes or lesions    CAPILLARY BLOOD GLUCOSE        I&O's Summary    06 Jul 2019 07:01  -  07 Jul 2019 07:00  --------------------------------------------------------  IN: 4331 mL / OUT: 1960 mL / NET: 2371 mL        LABS:                        10.3   46.07 )-----------( 121      ( 07 Jul 2019 07:30 )             36.8     07-07    139  |  100  |  21  ----------------------------<  234<H>  5.1   |  32<H>  |  0.81    Ca    8.9      07 Jul 2019 03:25  Phos  2.6     07-07  Mg     2.2     07-07    TPro  6.1  /  Alb  2.8<L>  /  TBili  < 0.2<L>  /  DBili  x   /  AST  20  /  ALT  56<H>  /  AlkPhos  70  07-07    PT/INR - ( 07 Jul 2019 07:30 )   PT: 12.5 SEC;   INR: 1.12          PTT - ( 07 Jul 2019 07:30 )  PTT:29.0 SEC          RADIOLOGY & ADDITIONAL TESTS:     MICROBIOLOGY:    ANTIMICROBIALS:    CONSULTS: Patient is a 48y old  Male who presents with a chief complaint of Bleeding from tracheal stoma (06 Jul 2019 07:18)      SUBJECTIVE / OVERNIGHT EVENTS:    Overnight pt was hypoxic and underwent bronchoscopy. This morning, pt went into cardiac arrest x3. Pt was successfully       MEDICATIONS  (STANDING):  ALBUTerol/ipratropium for Nebulization 3 milliLiter(s) Nebulizer every 6 hours  chlorhexidine 4% Liquid 1 Application(s) Topical <User Schedule>  cisatracurium Infusion 3 MICROgram(s)/kG/Min (15.012 mL/Hr) IV Continuous <Continuous>  dexmedetomidine Infusion 0.7 MICROgram(s)/kG/Hr (14.595 mL/Hr) IV Continuous <Continuous>  fentaNYL   Infusion. 0.5 MICROgram(s)/kG/Hr (4.17 mL/Hr) IV Continuous <Continuous>  heparin  Injectable 5000 Unit(s) SubCutaneous every 12 hours  meropenem  IVPB 1000 milliGRAM(s) IV Intermittent every 8 hours  methylPREDNISolone sodium succinate Injectable 40 milliGRAM(s) IV Push every 12 hours  midazolam Infusion 0.02 mG/kG/Hr (1.668 mL/Hr) IV Continuous <Continuous>  norepinephrine Infusion 0.05 MICROgram(s)/kG/Min (3.909 mL/Hr) IV Continuous <Continuous>  pantoprazole  Injectable 40 milliGRAM(s) IV Push daily  phenylephrine    Infusion 0.5 MICROgram(s)/kG/Min (15.637 mL/Hr) IV Continuous <Continuous>  propofol Infusion 40 MICROgram(s)/kG/Min (20.016 mL/Hr) IV Continuous <Continuous>  vancomycin  IVPB 1250 milliGRAM(s) IV Intermittent every 12 hours    MEDICATIONS  (PRN):  bisacodyl Suppository 10 milliGRAM(s) Rectal daily PRN Constipation  sodium chloride 0.9% lock flush 10 milliLiter(s) IV Push every 1 hour PRN Pre/post blood products, medications, blood draw, and to maintain line patency      Vital Signs Last 24 Hrs  T(C): 37.4 (07 Jul 2019 04:00), Max: 37.8 (07 Jul 2019 00:00)  T(F): 99.4 (07 Jul 2019 04:00), Max: 100 (07 Jul 2019 00:00)  HR: 130 (07 Jul 2019 07:31) (62 - 130)  BP: --  BP(mean): --  RR: 15 (07 Jul 2019 06:00) (5 - 23)  SpO2: 100% (07 Jul 2019 07:31) (91% - 100%)      PHYSICAL EXAM  GENERAL: NAD, well-developed  HEAD:  Atraumatic, Normocephalic  EYES: EOMI, PERRLA, conjunctiva and sclera clear  NECK: Supple, No JVD  CHEST/LUNG: Clear to auscultation bilaterally; No wheeze  HEART: Regular rate and rhythm; No murmurs, rubs, or gallops  ABDOMEN: Soft, Nontender, Nondistended; Bowel sounds present  EXTREMITIES:  2+ Peripheral Pulses, No clubbing, cyanosis, or edema  PSYCH: AAOx3  SKIN: No rashes or lesions    CAPILLARY BLOOD GLUCOSE        I&O's Summary    06 Jul 2019 07:01  -  07 Jul 2019 07:00  --------------------------------------------------------  IN: 4331 mL / OUT: 1960 mL / NET: 2371 mL        LABS:                        10.3   46.07 )-----------( 121      ( 07 Jul 2019 07:30 )             36.8     07-07    139  |  100  |  21  ----------------------------<  234<H>  5.1   |  32<H>  |  0.81    Ca    8.9      07 Jul 2019 03:25  Phos  2.6     07-07  Mg     2.2     07-07    TPro  6.1  /  Alb  2.8<L>  /  TBili  < 0.2<L>  /  DBili  x   /  AST  20  /  ALT  56<H>  /  AlkPhos  70  07-07    PT/INR - ( 07 Jul 2019 07:30 )   PT: 12.5 SEC;   INR: 1.12          PTT - ( 07 Jul 2019 07:30 )  PTT:29.0 SEC          RADIOLOGY & ADDITIONAL TESTS:     MICROBIOLOGY:    ANTIMICROBIALS:    CONSULTS: Patient is a 48y old  Male who presents with a chief complaint of Bleeding from tracheal stoma (06 Jul 2019 07:18)      SUBJECTIVE / OVERNIGHT EVENTS:    Overnight pt was hypoxic and underwent bronchoscopy. This morning, pt went into cardiac arrest x3. Pt was successfully resuscitated but eventually family opted for DNR. Pt ultimately passed away at 1.        MEDICATIONS  (STANDING):  ALBUTerol/ipratropium for Nebulization 3 milliLiter(s) Nebulizer every 6 hours  chlorhexidine 4% Liquid 1 Application(s) Topical <User Schedule>  cisatracurium Infusion 3 MICROgram(s)/kG/Min (15.012 mL/Hr) IV Continuous <Continuous>  dexmedetomidine Infusion 0.7 MICROgram(s)/kG/Hr (14.595 mL/Hr) IV Continuous <Continuous>  fentaNYL   Infusion. 0.5 MICROgram(s)/kG/Hr (4.17 mL/Hr) IV Continuous <Continuous>  heparin  Injectable 5000 Unit(s) SubCutaneous every 12 hours  meropenem  IVPB 1000 milliGRAM(s) IV Intermittent every 8 hours  methylPREDNISolone sodium succinate Injectable 40 milliGRAM(s) IV Push every 12 hours  midazolam Infusion 0.02 mG/kG/Hr (1.668 mL/Hr) IV Continuous <Continuous>  norepinephrine Infusion 0.05 MICROgram(s)/kG/Min (3.909 mL/Hr) IV Continuous <Continuous>  pantoprazole  Injectable 40 milliGRAM(s) IV Push daily  phenylephrine    Infusion 0.5 MICROgram(s)/kG/Min (15.637 mL/Hr) IV Continuous <Continuous>  propofol Infusion 40 MICROgram(s)/kG/Min (20.016 mL/Hr) IV Continuous <Continuous>  vancomycin  IVPB 1250 milliGRAM(s) IV Intermittent every 12 hours    MEDICATIONS  (PRN):  bisacodyl Suppository 10 milliGRAM(s) Rectal daily PRN Constipation  sodium chloride 0.9% lock flush 10 milliLiter(s) IV Push every 1 hour PRN Pre/post blood products, medications, blood draw, and to maintain line patency      Vital Signs Last 24 Hrs  T(C): 37.4 (07 Jul 2019 04:00), Max: 37.8 (07 Jul 2019 00:00)  T(F): 99.4 (07 Jul 2019 04:00), Max: 100 (07 Jul 2019 00:00)  HR: 130 (07 Jul 2019 07:31) (62 - 130)  BP: --  BP(mean): --  RR: 15 (07 Jul 2019 06:00) (5 - 23)  SpO2: 100% (07 Jul 2019 07:31) (91% - 100%)      PHYSICAL EXAM  GENERAL: Intubated and sedated   HEAD:  Atraumatic, Normocephalic  EYES: Conjunctiva and sclera clear  NECK: Supple, No JVD  CHEST/LUNG: diffuse rhonchi and crackles   HEART: Irregular and fast fast rate and rhythm.   ABDOMEN: Soft, Nontender, Nondistended; Bowel sounds present  EXTREMITIES:  2+ Peripheral Pulses, No clubbing, cyanosis, or edema    CAPILLARY BLOOD GLUCOSE        I&O's Summary    06 Jul 2019 07:01  -  07 Jul 2019 07:00  --------------------------------------------------------  IN: 4331 mL / OUT: 1960 mL / NET: 2371 mL        LABS:                        10.3   46.07 )-----------( 121      ( 07 Jul 2019 07:30 )             36.8     07-07    139  |  100  |  21  ----------------------------<  234<H>  5.1   |  32<H>  |  0.81    Ca    8.9      07 Jul 2019 03:25  Phos  2.6     07-07  Mg     2.2     07-07    TPro  6.1  /  Alb  2.8<L>  /  TBili  < 0.2<L>  /  DBili  x   /  AST  20  /  ALT  56<H>  /  AlkPhos  70  07-07    PT/INR - ( 07 Jul 2019 07:30 )   PT: 12.5 SEC;   INR: 1.12          PTT - ( 07 Jul 2019 07:30 )  PTT:29.0 SEC          RADIOLOGY & ADDITIONAL TESTS:     MICROBIOLOGY:    ANTIMICROBIALS:    CONSULTS:

## 2019-07-07 NOTE — CHART NOTE - NSCHARTNOTEFT_GEN_A_CORE
Went to round on Mr. Dickens and ordered the VDR to be done prior to my arrival. The patient had difficulty ventilating with the VDR and there was no suctioning that occurred while on the VDR. Multiple settings were tried with no success and a total of an hour went by without any success with ventilation or removal of mucus. During this time, I attempted to contact Dr. Philippe Todd but was unable to reach him personally. I called the answering service and spoke with Dr. Alexis at the [facility] who deemed Mr. Dickens not a candidate for lung transplantation during this admission. In addition, given his candida aureus and no final destination in sight, he is not an ECMO candidate at this time. Multiple ventilatory strategies were performed including pressure control, volume control, and ambu bagging with high peep. Currently, the highest oxygen saturation achieved was 80% on an FiO2 of 1.0. Grave prognosis relayed to family at bedside whom cannot make a decision regarding DNR status. Left patient on volume control for now and told family there is no more advanced measures that can be done for ventilation at this time. Will perform CPR if needed but without ventilation, the outcome is likely have any improvement. Nursing staff and medical staff made aware.     I was present for this full encounter and personally was involved in this patient's care with a critical care time >120 minutes.

## 2019-07-07 NOTE — PROVIDER CONTACT NOTE (OTHER) - ASSESSMENT
patient sedated and remain hemodynamically stable on pressors, tolerating vent setting AC12, , fio2 40%, peep 5

## 2019-07-07 NOTE — PROGRESS NOTE ADULT - REASON FOR ADMISSION
Bleeding from tracheal stoma
Respiratory Failure
Bleeding from tracheal stoma

## 2019-07-07 NOTE — CHART NOTE - NSCHARTNOTEFT_GEN_A_CORE
MEDICINE NOTE    Called to bedside to evaluate the patient for .     On physical exam, patient did not respond to verbal or noxious stimuli.  No spontaneous respirations.  Absent heart and breath sounds.  Absent radial and carotid pulses.   Pupils are fixed and dilated, no corneal reflex.  EKG rhythm strip shows asystole.   Patient pronounced dead at 1 pm.  Attending notified.  Family refused autopsy. MEDICINE NOTE    Called to bedside to evaluate the patient.     On physical exam, patient did not respond to verbal or noxious stimuli.  No spontaneous respirations.  Absent heart and breath sounds.  Absent radial and carotid pulses.   Pupils are fixed and dilated, no corneal reflex.  EKG rhythm strip shows asystole.   Patient pronounced dead at 1 pm.  Attending notified.  Family refused autopsy.

## 2019-07-07 NOTE — DISCHARGE NOTE FOR THE EXPIRED PATIENT - HOSPITAL COURSE
48-yo M w/ PMH of sarcoidosis, pulmonary fibrosis, aspergilloma s/p DAVID lobectomy, transferred to [facility] CTICU from [facility] for trach stoma bleeding, c/b Candida auris skin colonization, transferred to MICU for further management. In the MICU patient had cardiac arrest x6 due hypoxia. Pt was transitioned to VDR with no improvement. He was also considered for EKMO but he was deemed not eligible for lung transplant by [facility]. As a result, he was finally made DNR by his family and patient ultimately passed away on 7/72019 at 1 pm. Family was informed.

## 2019-07-07 NOTE — PROGRESS NOTE ADULT - ATTENDING COMMENTS
AA
Please see Dr. Shirley' initial consult note on July 1st for the most through note regarding Mr. Dickens' extensive medical history.    48-yo M w/ PMH of sarcoidosis, pulmonary fibrosis, aspergilloma s/p DAVID lobectomy, transferred to [facility]ICU from [facility] for trach stoma bleeding, c/b Candida auris infection and subsequently intubated for hypercarbic respiratory failure now on broad spectrum antibiotics. Appreciate ID input. Will follow up on cultures and reduce any antibiotics as necessary. Will attempt CPAP trial later today and daily but still requires at least 30% FiO2 support. Candida Aureus is colonization of the groin and not an active infection. Family at bedside and told of plan.
Part of this note is copied and pasted from the event note from earlier today.     Went to round on Mr. Dickens and ordered the VDR to be done prior to my arrival. The patient had difficulty ventilating with the VDR and there was no suctioning that occurred while on the VDR. Multiple settings were tried with no success and a total of an hour went by without any success with ventilation or removal of mucus. During this time, I attempted to contact Dr. Philippe Todd but was unable to reach him personally. I called the answering service and spoke with Dr. Alexis at the [facility] who deemed Mr. Dickens not a candidate for lung transplantation during this admission. In addition, given his candida aureus and no final destination in sight, he is not an ECMO candidate at this time. Multiple ventilatory strategies were performed including pressure control, volume control, and ambu bagging with high peep. Currently, the highest oxygen saturation achieved was 80% on an FiO2 of 1.0. Grave prognosis relayed to family at bedside whom cannot make a decision regarding DNR status. Left patient on volume control for now and told family there is no more advanced measures that can be done for ventilation at this time. Will perform CPR if needed but without ventilation, the outcome is likely have any improvement. Nursing staff and medical staff made aware.     Performed 2 rounds of CPR with the first round lasting 15 minutes and the second round lasting 5 minutes. After ROSC the second time the patient was made DNR.
48-yo M w/ PMH of sarcoidosis, pulmonary fibrosis, aspergilloma s/p DAVID lobectomy, transferred to [facility]ICU from [facility] for trach stoma bleeding, c/b Candida auris skin colonization, transferred to MICU for further management.    Overnight, the patient had 4 episodes of cardiac arrest likely due to severe hypoxic respiratory failure from mucus plugging. Patient paralyzed today to minimize mobility of secretions. Continues to have copious amount of secretions in the setting of appropriate antibiotic settings. Follow up bronch culture which was sent last night. Will determine if he is a candidate for ECMO if needed. Will also call [facility] and attempt to speak with Dr. Philippe Todd regarding this status of transplantation and how colonization of candida aureus may play a role.     Family spoken to several times today. Prognosis is guraded.

## 2019-07-09 LAB
-  AMIKACIN: SIGNIFICANT CHANGE UP
-  AZTREONAM: SIGNIFICANT CHANGE UP
-  CEFEPIME: SIGNIFICANT CHANGE UP
-  CEFTAZIDIME: SIGNIFICANT CHANGE UP
-  CIPROFLOXACIN: SIGNIFICANT CHANGE UP
-  GENTAMICIN: SIGNIFICANT CHANGE UP
-  IMIPENEM: SIGNIFICANT CHANGE UP
-  LEVOFLOXACIN: SIGNIFICANT CHANGE UP
-  MEROPENEM: SIGNIFICANT CHANGE UP
-  PIPERACILLIN/TAZOBACTAM: SIGNIFICANT CHANGE UP
-  TOBRAMYCIN: SIGNIFICANT CHANGE UP
BACTERIA SPT RESP CULT: SIGNIFICANT CHANGE UP
GRAM STN SPT: SIGNIFICANT CHANGE UP
METHOD TYPE: SIGNIFICANT CHANGE UP
ORGANISM # SPEC MICROSCOPIC CNT: SIGNIFICANT CHANGE UP
ORGANISM # SPEC MICROSCOPIC CNT: SIGNIFICANT CHANGE UP
SPECIMEN SOURCE: SIGNIFICANT CHANGE UP

## 2019-07-19 NOTE — PROGRESS NOTE ADULT - PROBLEM SELECTOR PLAN 6
Subjective   Aide Jenkins is a 34 y.o. female.     History of Present Illness   Pt presents for pregnancy testing after an abnormal period. I removed pt's Mirena IUD on 5/10. This is her 2nd period since removal. She states she was 2 days late for her period (Cycle day 30). On Day 28, she had a positive UPT, the following day had a negative UPT on a digital test. Bleeding was 4-4.5 days with significant cramping but not as bad as usual. She states that typically her bleeding is 7 full days of bleeding. Had + OPK on cycle day 14 both cycles.     Pt has felt very fatigued over the last 1.5 weeks. Denies GI concerns, Respiratory concerns. Has not been around anyone sick lately. Hasn't had labs in over a year.     She was concerned that lifting her 75lb son into the buggy 5 days prior to bleeding may have caused her to miscarry if she was pregnant.     The following portions of the patient's history were reviewed and updated as appropriate: allergies, current medications, past family history, past medical history, past social history, past surgical history and problem list.    Review of Systems   Constitutional: Positive for fatigue. Negative for appetite change, chills, fever, unexpected weight gain and unexpected weight loss.   HENT: Negative.    Respiratory: Negative.    Cardiovascular: Negative.    Gastrointestinal: Negative.  Negative for nausea and vomiting.   Endocrine: Positive for cold intolerance. Negative for heat intolerance.   Genitourinary: Positive for menstrual problem (unusual ). Negative for pelvic pain, vaginal bleeding, vaginal discharge, vaginal pain and breast pain.   Neurological: Negative for syncope and light-headedness.   Psychiatric/Behavioral: The patient is nervous/anxious (about conceiving).        Objective    Vitals:    07/19/19 1314   BP: 100/60   Pulse: 93         07/19/19  1314   Weight: 68.5 kg (151 lb)     Body mass index is 22.3 kg/m².    Physical Exam   Constitutional: She  "is oriented to person, place, and time. She appears well-developed and well-nourished.   Cardiovascular: Normal rate, regular rhythm and normal heart sounds.   Pulmonary/Chest: Effort normal and breath sounds normal.   Neurological: She is alert and oriented to person, place, and time.   Psychiatric: She has a normal mood and affect. Her behavior is normal.   Vitals reviewed.        Assessment/Plan   Aide was seen today for possible pregnancy.    Diagnoses and all orders for this visit:    Irregular periods  -     TSH  -     hCG, Quantitative, Pregnancy  -     CBC Auto Differential  -     Comprehensive Metabolic Panel  -     POC Pregnancy, Urine    Fatigue, unspecified type  -     TSH  -     hCG, Quantitative, Pregnancy  -     CBC Auto Differential  -     Comprehensive Metabolic Panel  -     POC Pregnancy, Urine    UPT is negative. With this only being patient's second period since IUD removal, it is not likely that she has established a \"normal\" pattern. She has history of irregular periods prior to IUD also. I do not believe patient miscarried given her description. We discussed that lifting her son one time would not likely cause a SAB. She voices understanding. Obtain the labs listed above. I added HCG quant to double check with patient's concern. I will call with lab results and discuss next steps PRN. Fatigue could be related to PMS. Continue prenatal vitamins daily, tracking cycles and timing intercourse and RTC PRN. Pt agrees with plan of care.            " Lovenox

## 2019-08-06 LAB — FUNGUS SPEC QL CULT: SIGNIFICANT CHANGE UP

## 2019-08-06 NOTE — DISCHARGE NOTE ADULT - DISCHARGE WEIGHT
bed Note Text (......Xxx Chief Complaint.): This diagnosis correlates with the Detail Level: Zone Other (Free Text): Quoted ~$600 for forehead, glabella, crows Other (Free Text): possible spitting suture lateral pole Other (Free Text): warm wet compress 5 min 1-2 x/dy x 1wk

## 2019-08-08 LAB — FUNGUS BLD CULT: SIGNIFICANT CHANGE UP

## 2019-08-11 LAB — ACID FAST STN SPT: SIGNIFICANT CHANGE UP

## 2019-08-31 NOTE — PROGRESS NOTE ADULT - PROBLEM/PLAN-5
DISPLAY PLAN FREE TEXT
no decreased eating/drinking/no dizziness/no fever/no chills/no weakness/no nausea/no vomiting/no tingling

## 2019-09-16 ENCOUNTER — APPOINTMENT (OUTPATIENT)
Dept: THORACIC SURGERY | Facility: CLINIC | Age: 49
End: 2019-09-16

## 2020-01-13 NOTE — PROGRESS NOTE ADULT - SUBJECTIVE AND OBJECTIVE BOX
Ongoing SW/CM Assessment/Plan of Care Note     See SW/CM flowsheets for goals and other objective data.    Patient/Family discharge goal (s):  Goal #1: Psychosocial needs assessed    PT Recommendation:  Recommendation for Discharge: PT WI: Sub-acute nursing home    OT Recommendation:  Recommendations for Discharge: OT WI: Less intensive rehab, Sub-acute nursing home    Progress note:   SW following for d/c planning. Chart reviewed and case discussed in OFT's. Pt not anticipated to d/c for 2-3 days. Pt has been accepted to [facility] and can transfer once medically stable.        Patient is a 47y old  Male who presents with a chief complaint of "My leg wouldn't stop shaking." (14 Sep 2017 09:59)  no hemoptysis  no cough   no fever      Any change in ROS:     MEDICATIONS  (STANDING):  atorvastatin 80 milliGRAM(s) Oral at bedtime  chlorhexidine 4% Liquid 1 Application(s) Topical daily  polyethylene glycol 3350 17 Gram(s) Oral at bedtime  piperacillin/tazobactam IVPB. 3.375 Gram(s) IV Intermittent every 8 hours  voriconazole 200 milliGRAM(s) Oral every 12 hours  sodium chloride 0.9%. 1000 milliLiter(s) (10 mL/Hr) IV Continuous <Continuous>  docusate sodium 100 milliGRAM(s) Oral two times a day  phenylephrine    Infusion 2 MICROgram(s)/kG/Min (36.375 mL/Hr) IV Continuous <Continuous>  ALBUTerol    90 MICROgram(s) HFA Inhaler 2 Puff(s) Inhalation every 6 hours  ipratropium 17 MICROgram(s) HFA Inhaler 1 Puff(s) Inhalation every 6 hours  aspirin  chewable 81 milliGRAM(s) Oral daily  diVALproex  milliGRAM(s) Oral two times a day  apixaban 5 milliGRAM(s) Oral every 12 hours  folic acid 1 milliGRAM(s) Oral daily  ferrous    sulfate 325 milliGRAM(s) Oral every 8 hours  predniSONE   Tablet 10 milliGRAM(s) Oral daily  pantoprazole    Tablet 40 milliGRAM(s) Oral two times a day before meals    MEDICATIONS  (PRN):  acetaminophen    Suspension 650 milliGRAM(s) Oral every 6 hours PRN For Temp greater than 38 C (100.4 F)  aluminum hydroxide/magnesium hydroxide/simethicone Suspension 30 milliLiter(s) Oral every 6 hours PRN Dyspepsia  oxyCODONE    IR 5 milliGRAM(s) Oral every 4 hours PRN Moderate Pain (4 - 6)  oxyCODONE    IR 10 milliGRAM(s) Oral every 4 hours PRN Severe Pain (7 - 10)  acetaminophen   Tablet. 650 milliGRAM(s) Oral every 6 hours PRN Mild Pain (1 - 3)    Vital Signs Last 24 Hrs  T(C): 36.5 (02 Oct 2017 16:00), Max: 37 (02 Oct 2017 04:00)  T(F): 97.7 (02 Oct 2017 16:00), Max: 98.6 (02 Oct 2017 04:00)  HR: 78 (02 Oct 2017 15:54) (71 - 100)  BP: 116/71 (02 Oct 2017 16:00) (96/65 - 132/87)  BP(mean): 81 (02 Oct 2017 16:00) (70 - 109)  RR: 19 (02 Oct 2017 16:00) (16 - 25)  SpO2: 100% (02 Oct 2017 16:00) (96% - 100%)    I&O's Summary    01 Oct 2017 07:01  -  02 Oct 2017 07:00  --------------------------------------------------------  IN: 670 mL / OUT: 1960 mL / NET: -1290 mL    02 Oct 2017 07:01  -  02 Oct 2017 17:07  --------------------------------------------------------  IN: 660 mL / OUT: 470 mL / NET: 190 mL          Physical Exam:   GENERAL: NAD, well-groomed, well-developed  HEENT: GIOVANI/   Atraumatic, Normocephalic  ENMT: No tonsillar erythema, exudates, or enlargement; Moist mucous membranes, Good dentition, No lesions  NECK: Supple, No JVD, Normal thyroid  CHEST/LUNG: Clear to auscultaion,  CVS: Regular rate and rhythm; No murmurs, rubs, or gallops  GI: : Soft, Nontender, Nondistended; Bowel sounds present  NERVOUS SYSTEM:  Alert & Oriented X3  EXTREMITIES:  2+ Peripheral Pulses, No clubbing, cyanosis, or edema  LYMPH: No lymphadenopathy noted  SKIN: No rashes or lesions  ENDOCRINOLOGY: No Thyromegaly  PSYCH: Appropriate    Labs:  28                            8.2    11.85 )-----------( 147      ( 02 Oct 2017 04:45 )             25.7                         7.7    10.21 )-----------( 121      ( 01 Oct 2017 04:00 )             23.6                         7.6    11.39 )-----------( 112      ( 30 Sep 2017 04:10 )             22.5                         7.1    12.18 )-----------( 107      ( 29 Sep 2017 04:30 )             22.3     10-02    142  |  101  |  18  ----------------------------<  95  4.0   |  36<H>  |  0.90  10-01    142  |  99  |  20  ----------------------------<  111<H>  3.6   |  36<H>  |  0.96  09-30    144  |  101  |  22  ----------------------------<  106<H>  3.9   |  37<H>  |  0.98  09-29    143  |  100  |  22  ----------------------------<  134<H>  4.3   |  37<H>  |  0.92    Ca    8.0<L>      02 Oct 2017 04:45  Ca    8.3<L>      01 Oct 2017 04:00  Phos  2.6     10-02  Phos  2.6     10-01  Mg     2.1     10-02  Mg     2.1     10-01      CAPILLARY BLOOD GLUCOSE                Cultures:         < from: Xray Chest 1 View AP- PORTABLE-Urgent (10.02.17 @ 12:06) >  INTERPRETATION:     The uppermost of the 2 left-sided chest tubes have been removed. Tiny   left basilar pneumothorax is seen easily identified by the presence of   calcified visceral pleura.  No change in the appearance of the lung   fields. Lower left-sided chest tube remains.      COMPARISON:  October 2 at 6:24 AM      IMPRESSION:  Status post chest tube removal with small left basilar   pneumothorax.                  RANDALL AMADOR M.D.; ATTENDING RADIOLOGIST  This document has been electronically signed. Oct  2 2017  2:09PM    < end of copied text >                      Studies  Chest X-RAY  CT SCAN Chest   Venous Dopplers: LE:   CT Abdomen  Others

## 2020-02-21 NOTE — ASSESSMENT
Quality 111:Pneumonia Vaccination Status For Older Adults: Pneumococcal Vaccination Previously Received
[FreeTextEntry1] : 47 y/o male, with Pmhx of sarcoidosis, aspergillosis, asthma/ COPD, PFO s/o CVA with residual left hemiplegia, required emergent DAVID lobectomy and IR embolization of left bronchial artery secondary to hemoptysis in 11/2017. He is s/p tracheostomy on 11/24/17 due to unsuccessfully weaned off the vent in the CTICU. He was d/c to rehab.  As noted, he has evaluated for lung transplant by [facility] few years ago, was not candidate at that time due to suboptimal heart function. \par \par CT chest scan 3/12/19\par - Multiple air-filled cystic spaces both lung fields consistent with pulmonary cyst or blebs with largest cyst DAVID, no change. \par -No change in interstitial lung markings.\par -No pulmonary mass, pleural effusion, pneumothorax, or enlarged lymph nodes. \par -Tracheostomy tube in place. \par \par Tracheostomy is still in place, he is on O2 1L/ NC. He has has repeated infection secondary to the trach and is being evaluated by ENT to have tracheostomy removed.The patient reports recent URI with symptoms of fever and cough treated with Abx. Symptoms have resolved as per patient. Stable shortness of breath with exertion. He reports a good appetite; no difficulty swallowing. The patient denies fever, chills, dysphagia or hemoptysis. \par \par I have reviewed the images with the patient and made the following recommendations. I have recommended that the patient follow up in 6 months with a CT scan of the chest. I agree with decision to remove tracheostomy. \par \par Written by Radha Hinson NP, acting as a scribe for Rodrigue Caldera MD.\par The documentation recorded by the scribe accurately reflects the service I personally performed and the decisions made by me. Rodrigue Caldera MD\par  
Detail Level: Detailed
Quality 110: Preventive Care And Screening: Influenza Immunization: Influenza Immunization previously received during influenza season

## 2020-10-22 NOTE — PHYSICAL THERAPY INITIAL EVALUATION ADULT - ACTIVE RANGE OF MOTION EXAMINATION, REHAB EVAL
Pt called back to discuss. Questions answered. Pt states is a little SOB but has a cold as well. Discussed that if she gets more SOB or is at all concerned, to present to ED for evaluation. Pt verbalized understanding and agreed with plan.
See TE 10/22/20
bilateral  lower extremity Active ROM was WFL (within functional limits)/Right UE Active ROM was WFL (within functional limits)/Left shoulder flexion ~60 degrees, Left elbow/hand/wrist WFL

## 2020-10-26 NOTE — CHART NOTE - NSCHARTNOTEFT_GEN_A_CORE
Bedside shift change report given to [address] (oncoming nurse) by Evens Rodriguez RN (offgoing nurse). Report included the following information SBAR, Cardiac Rhythm NSR and Dual Neuro Assessment. Pt with history of Sarcoid and COPD with recent Aspergillosis infection.  He is being transferred to ICU for close observation prior to a  planned Lobectomy by Dr. Alvarado.  During this hospitalization he ruled in for B/L CVA, was found to have a large PFO and developed Hemoptysis.  A Bronchoscopy was performed on 9/21 with control of bleeding with Epinephrine injection.  There was still concern for repeat bleed and hemoptysis.  Last night into early this morning, he had 4 episodes of hemoptysis that were self limiting.  Now a lobectomy has been planned to prevent a dangerous life threatening situation of hemoptysis that may not be able to be controlled.    Case discussed with Dr. Wen.

## 2020-10-28 NOTE — PROGRESS NOTE ADULT - PROBLEM SELECTOR PLAN 2
: still minor wheezing: cont steroids and he will need it for  long term.   And Symbicort with Atrovent nebs: can switch ti Spiriva too  11/10 Symbicort, Atrovent. keep O2 sat greater than 88%  11/11 continue current meds.  11/12 Continue current management Taltz Pregnancy And Lactation Text: The risk during pregnancy and breastfeeding is uncertain with this medication. Glycopyrrolate Counseling:  I discussed with the patient the risks of glycopyrrolate including but not limited to skin rash, drowsiness, dry mouth, difficulty urinating, and blurred vision. Prednisone Counseling:  I discussed with the patient the risks of prolonged use of prednisone including but not limited to weight gain, insomnia, osteoporosis, mood changes, diabetes, susceptibility to infection, glaucoma and high blood pressure.  In cases where prednisone use is prolonged, patients should be monitored with blood pressure checks, serum glucose levels and an eye exam.  Additionally, the patient may need to be placed on GI prophylaxis, PCP prophylaxis, and calcium and vitamin D supplementation and/or a bisphosphonate.  The patient verbalized understanding of the proper use and the possible adverse effects of prednisone.  All of the patient's questions and concerns were addressed. Albendazole Counseling:  I discussed with the patient the risks of albendazole including but not limited to cytopenia, kidney damage, nausea/vomiting and severe allergy.  The patient understands that this medication is being used in an off-label manner. Calcipotriene Pregnancy And Lactation Text: This medication has not been proven safe during pregnancy. It is unknown if this medication is excreted in breast milk. Glycopyrrolate Pregnancy And Lactation Text: This medication is Pregnancy Category B and is considered safe during pregnancy. It is unknown if it is excreted breast milk. Fluconazole Counseling:  Patient counseled regarding adverse effects of fluconazole including but not limited to headache, diarrhea, nausea, upset stomach, liver function test abnormalities, taste disturbance, and stomach pain.  There is a rare possibility of liver failure that can occur when taking fluconazole.  The patient understands that monitoring of LFTs and kidney function test may be required, especially at baseline. The patient verbalized understanding of the proper use and possible adverse effects of fluconazole.  All of the patient's questions and concerns were addressed. Clindamycin Pregnancy And Lactation Text: This medication can be used in pregnancy if certain situations. Clindamycin is also present in breast milk. Protopic Counseling: Patient may experience a mild burning sensation during topical application. Protopic is not approved in children less than 2 years of age. There have been case reports of hematologic and skin malignancies in patients using topical calcineurin inhibitors although causality is questionable. Tremfya Counseling: I discussed with the patient the risks of guselkumab including but not limited to immunosuppression, serious infections, worsening of inflammatory bowel disease and drug reactions.  The patient understands that monitoring is required including a PPD at baseline and must alert us or the primary physician if symptoms of infection or other concerning signs are noted. Opioid Counseling: I discussed with the patient the potential side effects of opioids including but not limited to addiction, altered mental status, and depression. I stressed avoiding alcohol, benzodiazepines, muscle relaxants and sleep aids unless specifically okayed by a physician. The patient verbalized understanding of the proper use and possible adverse effects of opioids. All of the patient's questions and concerns were addressed. They were instructed to flush the remaining pills down the toilet if they did not need them for pain. Tranexamic Acid Counseling:  Patient advised of the small risk of bleeding problems with tranexamic acid. They were also instructed to call if they developed any nausea, vomiting or diarrhea. All of the patient's questions and concerns were addressed. Prednisone Pregnancy And Lactation Text: This medication is Pregnancy Category C and it isn't know if it is safe during pregnancy. This medication is excreted in breast milk. Albendazole Pregnancy And Lactation Text: This medication is Pregnancy Category C and it isn't known if it is safe during pregnancy. It is also excreted in breast milk. 5-Fu Counseling: 5-Fluorouracil Counseling:  I discussed with the patient the risks of 5-fluorouracil including but not limited to erythema, scaling, itching, weeping, crusting, and pain. Fluconazole Pregnancy And Lactation Text: This medication is Pregnancy Category C and it isn't know if it is safe during pregnancy. It is also excreted in breast milk. Spironolactone Pregnancy And Lactation Text: This medication can cause feminization of the male fetus and should be avoided during pregnancy. The active metabolite is also found in breast milk. Doxycycline Counseling:  Patient counseled regarding possible photosensitivity and increased risk for sunburn.  Patient instructed to avoid sunlight, if possible.  When exposed to sunlight, patients should wear protective clothing, sunglasses, and sunscreen.  The patient was instructed to call the office immediately if the following severe adverse effects occur:  hearing changes, easy bruising/bleeding, severe headache, or vision changes.  The patient verbalized understanding of the proper use and possible adverse effects of doxycycline.  All of the patient's questions and concerns were addressed. SSKI Counseling:  I discussed with the patient the risks of SSKI including but not limited to thyroid abnormalities, metallic taste, GI upset, fever, headache, acne, arthralgias, paraesthesias, lymphadenopathy, easy bleeding, arrhythmias, and allergic reaction. 5-Fu Pregnancy And Lactation Text: This medication is Pregnancy Category X and contraindicated in pregnancy and in women who may become pregnant. It is unknown if this medication is excreted in breast milk. Protopic Pregnancy And Lactation Text: This medication is Pregnancy Category C. It is unknown if this medication is excreted in breast milk when applied topically. Tranexamic Acid Pregnancy And Lactation Text: It is unknown if this medication is safe during pregnancy or breast feeding. Griseofulvin Counseling:  I discussed with the patient the risks of griseofulvin including but not limited to photosensitivity, cytopenia, liver damage, nausea/vomiting and severe allergy.  The patient understands that this medication is best absorbed when taken with a fatty meal (e.g., ice cream or french fries). Rhofade Counseling: Rhofade is a topical medication which can decrease superficial blood flow where applied. Side effects are uncommon and include stinging, redness and allergic reactions. Opioid Pregnancy And Lactation Text: These medications can lead to premature delivery and should be avoided during pregnancy. These medications are also present in breast milk in small amounts. Ivermectin Counseling:  Patient instructed to take medication on an empty stomach with a full glass of water.  Patient informed of potential adverse effects including but not limited to nausea, diarrhea, dizziness, itching, and swelling of the extremities or lymph nodes.  The patient verbalized understanding of the proper use and possible adverse effects of ivermectin.  All of the patient's questions and concerns were addressed. Hydroxychloroquine Counseling:  I discussed with the patient that a baseline ophthalmologic exam is needed at the start of therapy and every year thereafter while on therapy. A CBC may also be warranted for monitoring.  The side effects of this medication were discussed with the patient, including but not limited to agranulocytosis, aplastic anemia, seizures, rashes, retinopathy, and liver toxicity. Patient instructed to call the office should any adverse effect occur.  The patient verbalized understanding of the proper use and possible adverse effects of Plaquenil.  All the patient's questions and concerns were addressed. Hydroxychloroquine Pregnancy And Lactation Text: This medication has been shown to cause fetal harm but it isn't assigned a Pregnancy Risk Category. There are small amounts excreted in breast milk. Doxycycline Pregnancy And Lactation Text: This medication is Pregnancy Category D and not consider safe during pregnancy. It is also excreted in breast milk but is considered safe for shorter treatment courses. Sski Pregnancy And Lactation Text: This medication is Pregnancy Category D and isn't considered safe during pregnancy. It is excreted in breast milk. Drysol Counseling:  I discussed with the patient the risks of drysol/aluminum chloride including but not limited to skin rash, itching, irritation, burning. Valtrex Counseling: I discussed with the patient the risks of valacyclovir including but not limited to kidney damage, nausea, vomiting and severe allergy.  The patient understands that if the infection seems to be worsening or is not improving, they are to call. Xeljanz Counseling: I discussed with the patient the risks of Xeljanz therapy including increased risk of infection, liver issues, headache, diarrhea, or cold symptoms. Live vaccines should be avoided. They were instructed to call if they have any problems. Griseofulvin Pregnancy And Lactation Text: This medication is Pregnancy Category X and is known to cause serious birth defects. It is unknown if this medication is excreted in breast milk but breast feeding should be avoided. Acitretin Counseling:  I discussed with the patient the risks of acitretin including but not limited to hair loss, dry lips/skin/eyes, liver damage, hyperlipidemia, depression/suicidal ideation, photosensitivity.  Serious rare side effects can include but are not limited to pancreatitis, pseudotumor cerebri, bony changes, clot formation/stroke/heart attack.  Patient understands that alcohol is contraindicated since it can result in liver toxicity and significantly prolong the elimination of the drug by many years. Erythromycin Counseling:  I discussed with the patient the risks of erythromycin including but not limited to GI upset, allergic reaction, drug rash, diarrhea, increase in liver enzymes, and yeast infections. Rhofade Pregnancy And Lactation Text: This medication has not been assigned a Pregnancy Risk Category. It is unknown if the medication is excreted in breast milk. Niacinamide Counseling: I recommended taking niacin or niacinamide, also know as vitamin B3, twice daily. Recent evidence suggests that taking vitamin B3 (500 mg twice daily) can reduce the risk of actinic keratoses and non-melanoma skin cancers. Side effects of vitamin B3 include flushing and headache. Acitretin Pregnancy And Lactation Text: This medication is Pregnancy Category X and should not be given to women who are pregnant or may become pregnant in the future. This medication is excreted in breast milk. Drysol Pregnancy And Lactation Text: This medication is considered safe during pregnancy and breast feeding. Thalidomide Counseling: I discussed with the patient the risks of thalidomide including but not limited to birth defects, anxiety, weakness, chest pain, dizziness, cough and severe allergy. Erythromycin Pregnancy And Lactation Text: This medication is Pregnancy Category B and is considered safe during pregnancy. It is also excreted in breast milk. Thalidomide Pregnancy And Lactation Text: This medication is Pregnancy Category X and is absolutely contraindicated during pregnancy. It is unknown if it is excreted in breast milk. Elidel Counseling: Patient may experience a mild burning sensation during topical application. Elidel is not approved in children less than 2 years of age. There have been case reports of hematologic and skin malignancies in patients using topical calcineurin inhibitors although causality is questionable. Valtrex Pregnancy And Lactation Text: this medication is Pregnancy Category B and is considered safe during pregnancy. This medication is not directly found in breast milk but it's metabolite acyclovir is present. Solaraze Counseling:  I discussed with the patient the risks of Solaraze including but not limited to erythema, scaling, itching, weeping, crusting, and pain. Xelrachelz Pregnancy And Lactation Text: This medication is Pregnancy Category D and is not considered safe during pregnancy.  The risk during breast feeding is also uncertain. Arava Counseling:  Patient counseled regarding adverse effects of Arava including but not limited to nausea, vomiting, abnormalities in liver function tests. Patients may develop mouth sores, rash, diarrhea, and abnormalities in blood counts. The patient understands that monitoring is required including LFTs and blood counts.  There is a rare possibility of scarring of the liver and lung problems that can occur when taking methotrexate. Persistent nausea, loss of appetite, pale stools, dark urine, cough, and shortness of breath should be reported immediately. Patient advised to discontinue Arava treatment and consult with a physician prior to attempting conception. The patient will have to undergo a treatment to eliminate Arava from the body prior to conception. Itraconazole Counseling:  I discussed with the patient the risks of itraconazole including but not limited to liver damage, nausea/vomiting, neuropathy, and severe allergy.  The patient understands that this medication is best absorbed when taken with acidic beverages such as non-diet cola or ginger ale.  The patient understands that monitoring is required including baseline LFTs and repeat LFTs at intervals.  The patient understands that they are to contact us or the primary physician if concerning signs are noted. Xolair Counseling:  Patient informed of potential adverse effects including but not limited to fever, muscle aches, rash and allergic reactions.  The patient verbalized understanding of the proper use and possible adverse effects of Xolair.  All of the patient's questions and concerns were addressed. Bexarotene Counseling:  I discussed with the patient the risks of bexarotene including but not limited to hair loss, dry lips/skin/eyes, liver abnormalities, hyperlipidemia, pancreatitis, depression/suicidal ideation, photosensitivity, drug rash/allergic reactions, hypothyroidism, anemia, leukopenia, infection, cataracts, and teratogenicity.  Patient understands that they will need regular blood tests to check lipid profile, liver function tests, white blood cell count, thyroid function tests and pregnancy test if applicable. Niacinamide Pregnancy And Lactation Text: These medications are considered safe during pregnancy. Use Enhanced Medication Counseling?: No Solaraze Pregnancy And Lactation Text: This medication is Pregnancy Category B and is considered safe. There is some data to suggest avoiding during the third trimester. It is unknown if this medication is excreted in breast milk. Nsaids Counseling: NSAID Counseling: I discussed with the patient that NSAIDs should be taken with food. Prolonged use of NSAIDs can result in the development of stomach ulcers.  Patient advised to stop taking NSAIDs if abdominal pain occurs.  The patient verbalized understanding of the proper use and possible adverse effects of NSAIDs.  All of the patient's questions and concerns were addressed. Metronidazole Counseling:  I discussed with the patient the risks of metronidazole including but not limited to seizures, nausea/vomiting, a metallic taste in the mouth, nausea/vomiting and severe allergy. Detail Level: Simple Infliximab Counseling:  I discussed with the patient the risks of infliximab including but not limited to myelosuppression, immunosuppression, autoimmune hepatitis, demyelinating diseases, lymphoma, and serious infections.  The patient understands that monitoring is required including a PPD at baseline and must alert us or the primary physician if symptoms of infection or other concerning signs are noted. Elidel Pregnancy And Lactation Text: This medication is Pregnancy Category C. It is unknown if this medication is excreted in breast milk. Clofazimine Counseling:  I discussed with the patient the risks of clofazimine including but not limited to skin and eye pigmentation, liver damage, nausea/vomiting, gastrointestinal bleeding and allergy. Xolair Pregnancy And Lactation Text: This medication is Pregnancy Category B and is considered safe during pregnancy. This medication is excreted in breast milk. Bexarotene Pregnancy And Lactation Text: This medication is Pregnancy Category X and should not be given to women who are pregnant or may become pregnant. This medication should not be used if you are breast feeding. Ketoconazole Counseling:   Patient counseled regarding improving absorption with orange juice.  Adverse effects include but are not limited to breast enlargement, headache, diarrhea, nausea, upset stomach, liver function test abnormalities, taste disturbance, and stomach pain.  There is a rare possibility of liver failure that can occur when taking ketoconazole. The patient understands that monitoring of LFTs may be required, especially at baseline. The patient verbalized understanding of the proper use and possible adverse effects of ketoconazole.  All of the patient's questions and concerns were addressed. Metronidazole Pregnancy And Lactation Text: This medication is Pregnancy Category B and considered safe during pregnancy.  It is also excreted in breast milk. Isotretinoin Counseling: Patient should get monthly blood tests, not donate blood, not drive at night if vision affected, not share medication, and not undergo elective surgery for 6 months after tx completed. Side effects reviewed, pt to contact office should one occur. Infliximab Pregnancy And Lactation Text: This medication is Pregnancy Category B and is considered safe during pregnancy. It is unknown if this medication is excreted in breast milk. Nsaids Pregnancy And Lactation Text: These medications are considered safe up to 30 weeks gestation. It is excreted in breast milk. Eucrisa Counseling: Patient may experience a mild burning sensation during topical application. Eucrisa is not approved in children less than 2 years of age. Cimzia Counseling:  I discussed with the patient the risks of Cimzia including but not limited to immunosuppression, allergic reactions and infections.  The patient understands that monitoring is required including a PPD at baseline and must alert us or the primary physician if symptoms of infection or other concerning signs are noted. Topical Retinoid counseling:  Patient advised to apply a pea-sized amount only at bedtime and wait 30 minutes after washing their face before applying.  If too drying, patient may add a non-comedogenic moisturizer. The patient verbalized understanding of the proper use and possible adverse effects of retinoids.  All of the patient's questions and concerns were addressed. Rituxan Counseling:  I discussed with the patient the risks of Rituxan infusions. Side effects can include infusion reactions, severe drug rashes including mucocutaneous reactions, reactivation of latent hepatitis and other infections and rarely progressive multifocal leukoencephalopathy.  All of the patient's questions and concerns were addressed. Clofazimine Pregnancy And Lactation Text: This medication is Pregnancy Category C and isn't considered safe during pregnancy. It is excreted in breast milk. Ketoconazole Pregnancy And Lactation Text: This medication is Pregnancy Category C and it isn't know if it is safe during pregnancy. It is also excreted in breast milk and breast feeding isn't recommended. Minocycline Counseling: Patient advised regarding possible photosensitivity and discoloration of the teeth, skin, lips, tongue and gums.  Patient instructed to avoid sunlight, if possible.  When exposed to sunlight, patients should wear protective clothing, sunglasses, and sunscreen.  The patient was instructed to call the office immediately if the following severe adverse effects occur:  hearing changes, easy bruising/bleeding, severe headache, or vision changes.  The patient verbalized understanding of the proper use and possible adverse effects of minocycline.  All of the patient's questions and concerns were addressed. Colchicine Counseling:  Patient counseled regarding adverse effects including but not limited to stomach upset (nausea, vomiting, stomach pain, or diarrhea).  Patient instructed to limit alcohol consumption while taking this medication.  Colchicine may reduce blood counts especially with prolonged use.  The patient understands that monitoring of kidney function and blood counts may be required, especially at baseline. The patient verbalized understanding of the proper use and possible adverse effects of colchicine.  All of the patient's questions and concerns were addressed. Azathioprine Counseling:  I discussed with the patient the risks of azathioprine including but not limited to myelosuppression, immunosuppression, hepatotoxicity, lymphoma, and infections.  The patient understands that monitoring is required including baseline LFTs, Creatinine, possible TPMP genotyping and weekly CBCs for the first month and then every 2 weeks thereafter.  The patient verbalized understanding of the proper use and possible adverse effects of azathioprine.  All of the patient's questions and concerns were addressed. Isotretinoin Pregnancy And Lactation Text: This medication is Pregnancy Category X and is considered extremely dangerous during pregnancy. It is unknown if it is excreted in breast milk. Odomzo Counseling- I discussed with the patient the risks of Odomzo including but not limited to nausea, vomiting, diarrhea, constipation, weight loss, changes in the sense of taste, decreased appetite, muscle spasms, and hair loss.  The patient verbalized understanding of the proper use and possible adverse effects of Odomzo.  All of the patient's questions and concerns were addressed. Eucrisa Pregnancy And Lactation Text: This medication has not been assigned a Pregnancy Risk Category but animal studies failed to show danger with the topical medication. It is unknown if the medication is excreted in breast milk. Cimzia Pregnancy And Lactation Text: This medication crosses the placenta but can be considered safe in certain situations. Cimzia may be excreted in breast milk. Tazorac Counseling:  Patient advised that medication is irritating and drying.  Patient may need to apply sparingly and wash off after an hour before eventually leaving it on overnight.  The patient verbalized understanding of the proper use and possible adverse effects of tazorac.  All of the patient's questions and concerns were addressed. Terbinafine Counseling: Patient counseling regarding adverse effects of terbinafine including but not limited to headache, diarrhea, rash, upset stomach, liver function test abnormalities, itching, taste/smell disturbance, nausea, abdominal pain, and flatulence.  There is a rare possibility of liver failure that can occur when taking terbinafine.  The patient understands that a baseline LFT and kidney function test may be required. The patient verbalized understanding of the proper use and possible adverse effects of terbinafine.  All of the patient's questions and concerns were addressed. High Dose Vitamin A Counseling: Side effects reviewed, pt to contact office should one occur. Minocycline Pregnancy And Lactation Text: This medication is Pregnancy Category D and not consider safe during pregnancy. It is also excreted in breast milk. Rituxan Pregnancy And Lactation Text: This medication is Pregnancy Category C and it isn't know if it is safe during pregnancy. It is unknown if this medication is excreted in breast milk but similar antibodies are known to be excreted. Azathioprine Pregnancy And Lactation Text: This medication is Pregnancy Category D and isn't considered safe during pregnancy. It is unknown if this medication is excreted in breast milk. Cosentyx Counseling:  I discussed with the patient the risks of Cosentyx including but not limited to worsening of Crohn's disease, immunosuppression, allergic reactions and infections.  The patient understands that monitoring is required including a PPD at baseline and must alert us or the primary physician if symptoms of infection or other concerning signs are noted. Hydroquinone Counseling:  Patient advised that medication may result in skin irritation, lightening (hypopigmentation), dryness, and burning.  In the event of skin irritation, the patient was advised to reduce the amount of the drug applied or use it less frequently.  Rarely, spots that are treated with hydroquinone can become darker (pseudoochronosis).  Should this occur, patient instructed to stop medication and call the office. The patient verbalized understanding of the proper use and possible adverse effects of hydroquinone.  All of the patient's questions and concerns were addressed. Terbinafine Pregnancy And Lactation Text: This medication is Pregnancy Category B and is considered safe during pregnancy. It is also excreted in breast milk and breast feeding isn't recommended. Quinolones Counseling:  I discussed with the patient the risks of fluoroquinolones including but not limited to GI upset, allergic reaction, drug rash, diarrhea, dizziness, photosensitivity, yeast infections, liver function test abnormalities, tendonitis/tendon rupture. Siliq Counseling:  I discussed with the patient the risks of Siliq including but not limited to new or worsening depression, suicidal thoughts and behavior, immunosuppression, malignancy, posterior leukoencephalopathy syndrome, and serious infections.  The patient understands that monitoring is required including a PPD at baseline and must alert us or the primary physician if symptoms of infection or other concerning signs are noted. There is also a special program designed to monitor depression which is required with Siliq. Tazorac Pregnancy And Lactation Text: This medication is not safe during pregnancy. It is unknown if this medication is excreted in breast milk. Topical Clindamycin Counseling: Patient counseled that this medication may cause skin irritation or allergic reactions.  In the event of skin irritation, the patient was advised to reduce the amount of the drug applied or use it less frequently.   The patient verbalized understanding of the proper use and possible adverse effects of clindamycin.  All of the patient's questions and concerns were addressed. Dapsone Counseling: I discussed with the patient the risks of dapsone including but not limited to hemolytic anemia, agranulocytosis, rashes, methemoglobinemia, kidney failure, peripheral neuropathy, headaches, GI upset, and liver toxicity.  Patients who start dapsone require monitoring including baseline LFTs and weekly CBCs for the first month, then every month thereafter.  The patient verbalized understanding of the proper use and possible adverse effects of dapsone.  All of the patient's questions and concerns were addressed. Cellcept Counseling:  I discussed with the patient the risks of mycophenolate mofetil including but not limited to infection/immunosuppression, GI upset, hypokalemia, hypercholesterolemia, bone marrow suppression, lymphoproliferative disorders, malignancy, GI ulceration/bleed/perforation, colitis, interstitial lung disease, kidney failure, progressive multifocal leukoencephalopathy, and birth defects.  The patient understands that monitoring is required including a baseline creatinine and regular CBC testing. In addition, patient must alert us immediately if symptoms of infection or other concerning signs are noted. High Dose Vitamin A Pregnancy And Lactation Text: High dose vitamin A therapy is contraindicated during pregnancy and breast feeding. Otezla Counseling: The side effects of Otezla were discussed with the patient, including but not limited to worsening or new depression, weight loss, diarrhea, nausea, upper respiratory tract infection, and headache. Patient instructed to call the office should any adverse effect occur.  The patient verbalized understanding of the proper use and possible adverse effects of Otezla.  All the patient's questions and concerns were addressed. Dapsone Pregnancy And Lactation Text: This medication is Pregnancy Category C and is not considered safe during pregnancy or breast feeding. Imiquimod Counseling:  I discussed with the patient the risks of imiquimod including but not limited to erythema, scaling, itching, weeping, crusting, and pain.  Patient understands that the inflammatory response to imiquimod is variable from person to person and was educated regarded proper titration schedule.  If flu-like symptoms develop, patient knows to discontinue the medication and contact us. Dupixent Counseling: I discussed with the patient the risks of dupilumab including but not limited to eye infection and irritation, cold sores, injection site reactions, worsening of asthma, allergic reactions and increased risk of parasitic infection.  Live vaccines should be avoided while taking dupilumab. Dupilumab will also interact with certain medications such as warfarin and cyclosporine. The patient understands that monitoring is required and they must alert us or the primary physician if symptoms of infection or other concerning signs are noted. Cimetidine Counseling:  I discussed with the patient the risks of Cimetidine including but not limited to gynecomastia, headache, diarrhea, nausea, drowsiness, arrhythmias, pancreatitis, skin rashes, psychosis, bone marrow suppression and kidney toxicity. Rifampin Counseling: I discussed with the patient the risks of rifampin including but not limited to liver damage, kidney damage, red-orange body fluids, nausea/vomiting and severe allergy. Otezla Pregnancy And Lactation Text: This medication is Pregnancy Category C and it isn't known if it is safe during pregnancy. It is unknown if it is excreted in breast milk. Topical Clindamycin Pregnancy And Lactation Text: This medication is Pregnancy Category B and is considered safe during pregnancy. It is unknown if it is excreted in breast milk. Simponi Counseling:  I discussed with the patient the risks of golimumab including but not limited to myelosuppression, immunosuppression, autoimmune hepatitis, demyelinating diseases, lymphoma, and serious infections.  The patient understands that monitoring is required including a PPD at baseline and must alert us or the primary physician if symptoms of infection or other concerning signs are noted. Azithromycin Counseling:  I discussed with the patient the risks of azithromycin including but not limited to GI upset, allergic reaction, drug rash, diarrhea, and yeast infections. Oxybutynin Counseling:  I discussed with the patient the risks of oxybutynin including but not limited to skin rash, drowsiness, dry mouth, difficulty urinating, and blurred vision. Dupixent Pregnancy And Lactation Text: This medication likely crosses the placenta but the risk for the fetus is uncertain. This medication is excreted in breast milk. Rifampin Pregnancy And Lactation Text: This medication is Pregnancy Category C and it isn't know if it is safe during pregnancy. It is also excreted in breast milk and should not be used if you are breast feeding. Minoxidil Counseling: Minoxidil is a topical medication which can increase blood flow where it is applied. It is uncertain how this medication increases hair growth. Side effects are uncommon and include stinging and allergic reactions. Topical Sulfur Applications Counseling: Topical Sulfur Counseling: Patient counseled that this medication may cause skin irritation or allergic reactions.  In the event of skin irritation, the patient was advised to reduce the amount of the drug applied or use it less frequently.   The patient verbalized understanding of the proper use and possible adverse effects of topical sulfur application.  All of the patient's questions and concerns were addressed. Cyclophosphamide Counseling:  I discussed with the patient the risks of cyclophosphamide including but not limited to hair loss, hormonal abnormalities, decreased fertility, abdominal pain, diarrhea, nausea and vomiting, bone marrow suppression and infection. The patient understands that monitoring is required while taking this medication. Erivedge Counseling- I discussed with the patient the risks of Erivedge including but not limited to nausea, vomiting, diarrhea, constipation, weight loss, changes in the sense of taste, decreased appetite, muscle spasms, and hair loss.  The patient verbalized understanding of the proper use and possible adverse effects of Erivedge.  All of the patient's questions and concerns were addressed. Azithromycin Pregnancy And Lactation Text: This medication is considered safe during pregnancy and is also secreted in breast milk. Enbrel Counseling:  I discussed with the patient the risks of etanercept including but not limited to myelosuppression, immunosuppression, autoimmune hepatitis, demyelinating diseases, lymphoma, and infections.  The patient understands that monitoring is required including a PPD at baseline and must alert us or the primary physician if symptoms of infection or other concerning signs are noted. Benzoyl Peroxide Counseling: Patient counseled that medicine may cause skin irritation and bleach clothing.  In the event of skin irritation, the patient was advised to reduce the amount of the drug applied or use it less frequently.   The patient verbalized understanding of the proper use and possible adverse effects of benzoyl peroxide.  All of the patient's questions and concerns were addressed. Topical Sulfur Applications Pregnancy And Lactation Text: This medication is Pregnancy Category C and has an unknown safety profile during pregnancy. It is unknown if this topical medication is excreted in breast milk. Doxepin Counseling:  Patient advised that the medication is sedating and not to drive a car after taking this medication. Patient informed of potential adverse effects including but not limited to dry mouth, urinary retention, and blurry vision.  The patient verbalized understanding of the proper use and possible adverse effects of doxepin.  All of the patient's questions and concerns were addressed. Propranolol Counseling:  I discussed with the patient the risks of propranolol including but not limited to low heart rate, low blood pressure, low blood sugar, restlessness and increased cold sensitivity. They should call the office if they experience any of these side effects. Sarecycline Counseling: Patient advised regarding possible photosensitivity and discoloration of the teeth, skin, lips, tongue and gums.  Patient instructed to avoid sunlight, if possible.  When exposed to sunlight, patients should wear protective clothing, sunglasses, and sunscreen.  The patient was instructed to call the office immediately if the following severe adverse effects occur:  hearing changes, easy bruising/bleeding, severe headache, or vision changes.  The patient verbalized understanding of the proper use and possible adverse effects of sarecycline.  All of the patient's questions and concerns were addressed. Cyclophosphamide Pregnancy And Lactation Text: This medication is Pregnancy Category D and it isn't considered safe during pregnancy. This medication is excreted in breast milk. Bactrim Counseling:  I discussed with the patient the risks of sulfa antibiotics including but not limited to GI upset, allergic reaction, drug rash, diarrhea, dizziness, photosensitivity, and yeast infections.  Rarely, more serious reactions can occur including but not limited to aplastic anemia, agranulocytosis, methemoglobinemia, blood dyscrasias, liver or kidney failure, lung infiltrates or desquamative/blistering drug rashes. Skyrizi Counseling: I discussed with the patient the risks of risankizumab-rzaa including but not limited to immunosuppression, and serious infections.  The patient understands that monitoring is required including a PPD at baseline and must alert us or the primary physician if symptoms of infection or other concerning signs are noted. Cyclosporine Counseling:  I discussed with the patient the risks of cyclosporine including but not limited to hypertension, gingival hyperplasia,myelosuppression, immunosuppression, liver damage, kidney damage, neurotoxicity, lymphoma, and serious infections. The patient understands that monitoring is required including baseline blood pressure, CBC, CMP, lipid panel and uric acid, and then 1-2 times monthly CMP and blood pressure. Finasteride Counseling:  I discussed with the patient the risks of use of finasteride including but not limited to decreased libido, decreased ejaculate volume, gynecomastia, and depression. Women should not handle medication.  All of the patient's questions and concerns were addressed. Benzoyl Peroxide Pregnancy And Lactation Text: This medication is Pregnancy Category C. It is unknown if benzoyl peroxide is excreted in breast milk. Doxepin Pregnancy And Lactation Text: This medication is Pregnancy Category C and it isn't known if it is safe during pregnancy. It is also excreted in breast milk and breast feeding isn't recommended. Propranolol Pregnancy And Lactation Text: This medication is Pregnancy Category C and it isn't known if it is safe during pregnancy. It is excreted in breast milk. Bactrim Pregnancy And Lactation Text: This medication is Pregnancy Category D and is known to cause fetal risk.  It is also excreted in breast milk. Carac Counseling:  I discussed with the patient the risks of Carac including but not limited to erythema, scaling, itching, weeping, crusting, and pain. Mirvaso Counseling: Mirvaso is a topical medication which can decrease superficial blood flow where applied. Side effects are uncommon and include stinging, redness and allergic reactions. Humira Counseling:  I discussed with the patient the risks of adalimumab including but not limited to myelosuppression, immunosuppression, autoimmune hepatitis, demyelinating diseases, lymphoma, and serious infections.  The patient understands that monitoring is required including a PPD at baseline and must alert us or the primary physician if symptoms of infection or other concerning signs are noted. Wartpeel Counseling:  I discussed with the patient the risks of Wartpeel including but not limited to erythema, scaling, itching, weeping, crusting, and pain. Stelara Counseling:  I discussed with the patient the risks of ustekinumab including but not limited to immunosuppression, malignancy, posterior leukoencephalopathy syndrome, and serious infections.  The patient understands that monitoring is required including a PPD at baseline and must alert us or the primary physician if symptoms of infection or other concerning signs are noted. Finasteride Pregnancy And Lactation Text: This medication is absolutely contraindicated during pregnancy. It is unknown if it is excreted in breast milk. Hydroxyzine Counseling: Patient advised that the medication is sedating and not to drive a car after taking this medication.  Patient informed of potential adverse effects including but not limited to dry mouth, urinary retention, and blurry vision.  The patient verbalized understanding of the proper use and possible adverse effects of hydroxyzine.  All of the patient's questions and concerns were addressed. Tetracycline Counseling: Patient counseled regarding possible photosensitivity and increased risk for sunburn.  Patient instructed to avoid sunlight, if possible.  When exposed to sunlight, patients should wear protective clothing, sunglasses, and sunscreen.  The patient was instructed to call the office immediately if the following severe adverse effects occur:  hearing changes, easy bruising/bleeding, severe headache, or vision changes.  The patient verbalized understanding of the proper use and possible adverse effects of tetracycline.  All of the patient's questions and concerns were addressed. Patient understands to avoid pregnancy while on therapy due to potential birth defects. Gabapentin Counseling: I discussed with the patient the risks of gabapentin including but not limited to dizziness, somnolence, fatigue and ataxia. Cephalexin Counseling: I counseled the patient regarding use of cephalexin as an antibiotic for prophylactic and/or therapeutic purposes. Cephalexin (commonly prescribed under brand name Keflex) is a cephalosporin antibiotic which is active against numerous classes of bacteria, including most skin bacteria. Side effects may include nausea, diarrhea, gastrointestinal upset, rash, hives, yeast infections, and in rare cases, hepatitis, kidney disease, seizures, fever, confusion, neurologic symptoms, and others. Patients with severe allergies to penicillin medications are cautioned that there is about a 10% incidence of cross-reactivity with cephalosporins. When possible, patients with penicillin allergies should use alternatives to cephalosporins for antibiotic therapy. Methotrexate Counseling:  Patient counseled regarding adverse effects of methotrexate including but not limited to nausea, vomiting, abnormalities in liver function tests. Patients may develop mouth sores, rash, diarrhea, and abnormalities in blood counts. The patient understands that monitoring is required including LFT's and blood counts.  There is a rare possibility of scarring of the liver and lung problems that can occur when taking methotrexate. Persistent nausea, loss of appetite, pale stools, dark urine, cough, and shortness of breath should be reported immediately. Patient advised to discontinue methotrexate treatment at least three months before attempting to become pregnant.  I discussed the need for folate supplements while taking methotrexate.  These supplements can decrease side effects during methotrexate treatment. The patient verbalized understanding of the proper use and possible adverse effects of methotrexate.  All of the patient's questions and concerns were addressed. Birth Control Pills Counseling: Birth Control Pill Counseling: I discussed with the patient the potential side effects of OCPs including but not limited to increased risk of stroke, heart attack, thrombophlebitis, deep venous thrombosis, hepatic adenomas, breast changes, GI upset, headaches, and depression.  The patient verbalized understanding of the proper use and possible adverse effects of OCPs. All of the patient's questions and concerns were addressed. Zyclara Counseling:  I discussed with the patient the risks of imiquimod including but not limited to erythema, scaling, itching, weeping, crusting, and pain.  Patient understands that the inflammatory response to imiquimod is variable from person to person and was educated regarded proper titration schedule.  If flu-like symptoms develop, patient knows to discontinue the medication and contact us. Hydroxyzine Pregnancy And Lactation Text: This medication is not safe during pregnancy and should not be taken. It is also excreted in breast milk and breast feeding isn't recommended. Birth Control Pills Pregnancy And Lactation Text: This medication should be avoided if pregnant and for the first 30 days post-partum. Cephalexin Pregnancy And Lactation Text: This medication is Pregnancy Category B and considered safe during pregnancy.  It is also excreted in breast milk but can be used safely for shorter doses. Methotrexate Pregnancy And Lactation Text: This medication is Pregnancy Category X and is known to cause fetal harm. This medication is excreted in breast milk. Calcipotriene Counseling:  I discussed with the patient the risks of calcipotriene including but not limited to erythema, scaling, itching, and irritation. Picato Counseling:  I discussed with the patient the risks of Picato including but not limited to erythema, scaling, itching, weeping, crusting, and pain. Clindamycin Counseling: I counseled the patient regarding use of clindamycin as an antibiotic for prophylactic and/or therapeutic purposes. Clindamycin is active against numerous classes of bacteria, including skin bacteria. Side effects may include nausea, diarrhea, gastrointestinal upset, rash, hives, yeast infections, and in rare cases, colitis. Spironolactone Counseling: Patient advised regarding risks of diarrhea, abdominal pain, hyperkalemia, birth defects (for female patients), liver toxicity and renal toxicity. The patient may need blood work to monitor liver and kidney function and potassium levels while on therapy. The patient verbalized understanding of the proper use and possible adverse effects of spironolactone.  All of the patient's questions and concerns were addressed. Ilumya Counseling: I discussed with the patient the risks of tildrakizumab including but not limited to immunosuppression, malignancy, posterior leukoencephalopathy syndrome, and serious infections.  The patient understands that monitoring is required including a PPD at baseline and must alert us or the primary physician if symptoms of infection or other concerning signs are noted. Taltz Counseling: I discussed with the patient the risks of ixekizumab including but not limited to immunosuppression, serious infections, worsening of inflammatory bowel disease and drug reactions.  The patient understands that monitoring is required including a PPD at baseline and must alert us or the primary physician if symptoms of infection or other concerning signs are noted.

## 2020-11-30 NOTE — PROGRESS NOTE ADULT - PROBLEM SELECTOR PROBLEM 5
pt c/o chest pain x1 day. Pt denies any associated symptoms including weakness, SOB, numbness. Pt states took 2 sublingual Nitroglycerin prior to arrival but did not relieve pain. Hx CABG, HLD, HTN Sarcoidosis

## 2020-12-04 NOTE — BEHAVIORAL HEALTH ASSESSMENT NOTE - NS ED BHA REVIEW OF ED CHART AVAILABLE IMAGING REVIEWED
Start the prozac in the morning.  Remember, if you have some mild side effects, these should resolve after the first week.  You should start to feel some sort of an improvement over the first 2 weeks.  Let me know if you do not.  See me in 6 weeks for a recheck, sooner if necessary.     None available

## 2021-01-20 NOTE — PATIENT PROFILE ADULT - DO YOU FEEL UNSAFE AT SCHOOL?
Wt Readings from Last 3 Encounters:   01/17/21 75 kg (165 lb 5 5 oz)   01/05/21 75 6 kg (166 lb 9 6 oz)   12/29/20 76 1 kg (167 lb 12 8 oz)     Patient appears to be euvolemic on examination  Does not take Lasix as outpatient  Echo 1 year ago showed an EF of 28% grade 1 diastolic dysfunction not applicable

## 2021-02-04 NOTE — PROGRESS NOTE ADULT - VASCULAR
Topical Retinoid Pregnancy And Lactation Text: This medication is Pregnancy Category C. It is unknown if this medication is excreted in breast milk.
Birth Control Pills Counseling: Birth Control Pill Counseling: I discussed with the patient the potential side effects of OCPs including but not limited to increased risk of stroke, heart attack, thrombophlebitis, deep venous thrombosis, hepatic adenomas, breast changes, GI upset, headaches, and depression.  The patient verbalized understanding of the proper use and possible adverse effects of OCPs. All of the patient's questions and concerns were addressed.
Isotretinoin Pregnancy And Lactation Text: This medication is Pregnancy Category X and is considered extremely dangerous during pregnancy. It is unknown if it is excreted in breast milk.
High Dose Vitamin A Counseling: Side effects reviewed, pt to contact office should one occur.
Azithromycin Counseling:  I discussed with the patient the risks of azithromycin including but not limited to GI upset, allergic reaction, drug rash, diarrhea, and yeast infections.
Tetracycline Pregnancy And Lactation Text: This medication is Pregnancy Category D and not consider safe during pregnancy. It is also excreted in breast milk.
Doxycycline Pregnancy And Lactation Text: This medication is Pregnancy Category D and not consider safe during pregnancy. It is also excreted in breast milk but is considered safe for shorter treatment courses.
Equal and normal pulses (carotid, femoral, dorsalis pedis)
Topical Sulfur Applications Counseling: Topical Sulfur Counseling: Patient counseled that this medication may cause skin irritation or allergic reactions.  In the event of skin irritation, the patient was advised to reduce the amount of the drug applied or use it less frequently.   The patient verbalized understanding of the proper use and possible adverse effects of topical sulfur application.  All of the patient's questions and concerns were addressed.
detailed exam
Sarecycline Counseling: Patient advised regarding possible photosensitivity and discoloration of the teeth, skin, lips, tongue and gums.  Patient instructed to avoid sunlight, if possible.  When exposed to sunlight, patients should wear protective clothing, sunglasses, and sunscreen.  The patient was instructed to call the office immediately if the following severe adverse effects occur:  hearing changes, easy bruising/bleeding, severe headache, or vision changes.  The patient verbalized understanding of the proper use and possible adverse effects of sarecycline.  All of the patient's questions and concerns were addressed.
Equal and normal pulses (carotid, femoral, dorsalis pedis)
Topical Sulfur Applications Pregnancy And Lactation Text: This medication is Pregnancy Category C and has an unknown safety profile during pregnancy. It is unknown if this topical medication is excreted in breast milk.
Birth Control Pills Pregnancy And Lactation Text: This medication should be avoided if pregnant and for the first 30 days post-partum.
Equal and normal pulses (carotid, femoral, dorsalis pedis)
Tazorac Counseling:  Patient advised that medication is irritating and drying.  Patient may need to apply sparingly and wash off after an hour before eventually leaving it on overnight.  The patient verbalized understanding of the proper use and possible adverse effects of tazorac.  All of the patient's questions and concerns were addressed.
Equal and normal pulses (carotid, femoral, dorsalis pedis)
Dapsone Counseling: I discussed with the patient the risks of dapsone including but not limited to hemolytic anemia, agranulocytosis, rashes, methemoglobinemia, kidney failure, peripheral neuropathy, headaches, GI upset, and liver toxicity.  Patients who start dapsone require monitoring including baseline LFTs and weekly CBCs for the first month, then every month thereafter.  The patient verbalized understanding of the proper use and possible adverse effects of dapsone.  All of the patient's questions and concerns were addressed.
Benzoyl Peroxide Counseling: Patient counseled that medicine may cause skin irritation and bleach clothing.  In the event of skin irritation, the patient was advised to reduce the amount of the drug applied or use it less frequently.   The patient verbalized understanding of the proper use and possible adverse effects of benzoyl peroxide.  All of the patient's questions and concerns were addressed.
Equal and normal pulses (carotid, femoral, dorsalis pedis)
High Dose Vitamin A Pregnancy And Lactation Text: High dose vitamin A therapy is contraindicated during pregnancy and breast feeding.
Azithromycin Pregnancy And Lactation Text: This medication is considered safe during pregnancy and is also secreted in breast milk.
Equal and normal pulses (carotid, femoral, dorsalis pedis)
Equal and normal pulses (carotid, femoral, dorsalis pedis)
Erythromycin Counseling:  I discussed with the patient the risks of erythromycin including but not limited to GI upset, allergic reaction, drug rash, diarrhea, increase in liver enzymes, and yeast infections.
Erythromycin Pregnancy And Lactation Text: This medication is Pregnancy Category B and is considered safe during pregnancy. It is also excreted in breast milk.
Equal and normal pulses (carotid, femoral, dorsalis pedis)
Spironolactone Counseling: Patient advised regarding risks of diarrhea, abdominal pain, hyperkalemia, birth defects (for female patients), liver toxicity and renal toxicity. The patient may need blood work to monitor liver and kidney function and potassium levels while on therapy. The patient verbalized understanding of the proper use and possible adverse effects of spironolactone.  All of the patient's questions and concerns were addressed.
detailed exam
Tazorac Pregnancy And Lactation Text: This medication is not safe during pregnancy. It is unknown if this medication is excreted in breast milk.
Detail Level: Zone
Benzoyl Peroxide Pregnancy And Lactation Text: This medication is Pregnancy Category C. It is unknown if benzoyl peroxide is excreted in breast milk.
Dapsone Pregnancy And Lactation Text: This medication is Pregnancy Category C and is not considered safe during pregnancy or breast feeding.
Topical Clindamycin Counseling: Patient counseled that this medication may cause skin irritation or allergic reactions.  In the event of skin irritation, the patient was advised to reduce the amount of the drug applied or use it less frequently.   The patient verbalized understanding of the proper use and possible adverse effects of clindamycin.  All of the patient's questions and concerns were addressed.
detailed exam
Minocycline Counseling: Patient advised regarding possible photosensitivity and discoloration of the teeth, skin, lips, tongue and gums.  Patient instructed to avoid sunlight, if possible.  When exposed to sunlight, patients should wear protective clothing, sunglasses, and sunscreen.  The patient was instructed to call the office immediately if the following severe adverse effects occur:  hearing changes, easy bruising/bleeding, severe headache, or vision changes.  The patient verbalized understanding of the proper use and possible adverse effects of minocycline.  All of the patient's questions and concerns were addressed.
Bactrim Counseling:  I discussed with the patient the risks of sulfa antibiotics including but not limited to GI upset, allergic reaction, drug rash, diarrhea, dizziness, photosensitivity, and yeast infections.  Rarely, more serious reactions can occur including but not limited to aplastic anemia, agranulocytosis, methemoglobinemia, blood dyscrasias, liver or kidney failure, lung infiltrates or desquamative/blistering drug rashes.
Equal and normal pulses (carotid, femoral, dorsalis pedis)
Equal and normal pulses (carotid, femoral, dorsalis pedis)
Bactrim Pregnancy And Lactation Text: This medication is Pregnancy Category D and is known to cause fetal risk.  It is also excreted in breast milk.
Spironolactone Pregnancy And Lactation Text: This medication can cause feminization of the male fetus and should be avoided during pregnancy. The active metabolite is also found in breast milk.
Isotretinoin Counseling: Patient should get monthly blood tests, not donate blood, not drive at night if vision affected, not share medication, and not undergo elective surgery for 6 months after tx completed. Side effects reviewed, pt to contact office should one occur.
Include Pregnancy/Lactation Warning?: No
Doxycycline Counseling:  Patient counseled regarding possible photosensitivity and increased risk for sunburn.  Patient instructed to avoid sunlight, if possible.  When exposed to sunlight, patients should wear protective clothing, sunglasses, and sunscreen.  The patient was instructed to call the office immediately if the following severe adverse effects occur:  hearing changes, easy bruising/bleeding, severe headache, or vision changes.  The patient verbalized understanding of the proper use and possible adverse effects of doxycycline.  All of the patient's questions and concerns were addressed.
Topical Retinoid counseling:  Patient advised to apply a pea-sized amount only at bedtime and wait 30 minutes after washing their face before applying.  If too drying, patient may add a non-comedogenic moisturizer. The patient verbalized understanding of the proper use and possible adverse effects of retinoids.  All of the patient's questions and concerns were addressed.
Equal and normal pulses (carotid, femoral, dorsalis pedis)
Tetracycline Counseling: Patient counseled regarding possible photosensitivity and increased risk for sunburn.  Patient instructed to avoid sunlight, if possible.  When exposed to sunlight, patients should wear protective clothing, sunglasses, and sunscreen.  The patient was instructed to call the office immediately if the following severe adverse effects occur:  hearing changes, easy bruising/bleeding, severe headache, or vision changes.  The patient verbalized understanding of the proper use and possible adverse effects of tetracycline.  All of the patient's questions and concerns were addressed. Patient understands to avoid pregnancy while on therapy due to potential birth defects.
Topical Clindamycin Pregnancy And Lactation Text: This medication is Pregnancy Category B and is considered safe during pregnancy. It is unknown if it is excreted in breast milk.
Equal and normal pulses (carotid, femoral, dorsalis pedis)

## 2021-03-10 NOTE — PATIENT PROFILE ADULT - FUNCTIONAL SCREEN CURRENT LEVEL: SWALLOWING (IF SCORE 2 OR MORE FOR ANY ITEM, CONSULT REHAB SERVICES), MLM)
[facility]                                                               Office: [address] [address]                                                              Telephone: [phone]. Fax:[phone]                                                                             PROGRESS NOTE  Reason for follow up: CAD/ICM/HFrEF, severe AS s/p CABG, bio-AVR, CHB, CRT-D  Overnight: No new events.   Update: recurrent R-pleural effusion s/p chest tube yesterday, repeat TTE with improved LV systolic function/EF      Review of symptoms:   Cardiac:  No chest pain. No dyspnea. No palpitations.  Respiratory: No cough. No dyspnea  Gastrointestinal: No diarrhea. No abdominal pain. No bleeding.     Past medical history: No updates.   	  Vital Signs Last 24 Hrs  T(C): 36.6 (03-10-21 @ 09:44), Max: 37.2 (03-09-21 @ 21:00)  T(F): 97.8 (03-10-21 @ 09:44), Max: 99 (03-09-21 @ 21:00)  HR: 83 (03-10-21 @ 09:44) (79 - 100)  BP: 89/50 (03-10-21 @ 09:44) (89/50 - 126/74)  BP(mean): --  RR: 18 (03-10-21 @ 09:44) (18 - 18)  SpO2: 98% (03-10-21 @ 09:44) (95% - 98%)  I&O's Summary    09 Mar 2021 07:01  -  10 Mar 2021 07:00  --------------------------------------------------------  IN: 720 mL / OUT: 1501 mL / NET: -781 mL    10 Mar 2021 07:01  -  10 Mar 2021 12:27  --------------------------------------------------------  IN: 120 mL / OUT: 0 mL / NET: 120 mL          PHYSICAL EXAM:  Appearance: Comfortable. No acute distress, laying in chair tired   HEENT:  Head and neck: Atraumatic. Normocephalic.  Normal oral mucosa, PERRL, Neck is supple. No JVD.   Neurologic: A&Ox 2, no focal deficits. EOMI, Cranial nerves are intact.  Lymphatic: No cervical lymphadenopathy  Cardiovascular: Normal S1 S2, No murmur, rubs/gallops. No JVD  Respiratory: Lungs clear to auscultation  Gastrointestinal:  Soft, Non-tender, + BS  Lower Extremities: +1 bilateral edema  Psychiatry: Patient is calm. No agitation. Mood & affect appropriate  Skin: No rashes/ecchymoses/cyanosis/ulcers visualized on the face, hands or feet.      CURRENT MEDICATIONS:  MEDICATIONS  (STANDING):  ascorbic acid 500 milliGRAM(s) Oral daily  aspirin enteric coated 81 milliGRAM(s) Oral daily  atorvastatin 40 milliGRAM(s) Oral at bedtime  enoxaparin Injectable 30 milliGRAM(s) SubCutaneous daily  famotidine    Tablet 20 milliGRAM(s) Oral daily  ferrous    sulfate 325 milliGRAM(s) Oral daily  folic acid 1 milliGRAM(s) Oral daily  levothyroxine 100 MICROGram(s) Oral daily  magnesium oxide 400 milliGRAM(s) Oral three times a day with meals  metoprolol tartrate 12.5 milliGRAM(s) Oral two times a day  midodrine. 5 milliGRAM(s) Oral three times a day  potassium chloride    Tablet ER 20 milliEquivalent(s) Oral daily  senna 2 Tablet(s) Oral at bedtime  sodium chloride 0.9% lock flush 3 milliLiter(s) IV Push every 8 hours  torsemide 20 milliGRAM(s) Oral daily    MEDICATIONS  (PRN):  acetaminophen   Tablet .. 650 milliGRAM(s) Oral every 6 hours PRN Moderate Pain (4 - 6)  polyethylene glycol 3350 17 Gram(s) Oral daily PRN Constipation      DIAGNOSTIC TESTING:  [ ] Echocardiogram:   < from: TTE Echo Complete w/ Contrast w/ Doppler (03.09.21 @ 11:08) >  Summary:   1. Small circumferential pericardial effusion without echo evidence of cardiac tamponade.   2. Left ventricular ejection fraction, by visual estimation, is 55 to 60%.   3. Normal global left ventricular systolic function.   4. Spectral Doppler shows impaired relaxation pattern of left ventricular myocardial filling (Grade I diastolic dysfunction).   5. There is severe concentric left ventricular hypertrophy.   6. Severely enlarged left atrium.   7. Normal right ventricular size and function.   8. Mild tricuspid regurgitation.   9. Mild mitral valve regurgitation.  10. Mitral valve mean gradient is 4.2 mmHg (HR 76) consistent with mild mitral stenosis.  11. Severe thickening and calcification of the anterior and posterior mitral valve leaflets.  12. Mild mitral annular calcification.  13. Aortic valve is normally functioning bioprosthetic valve. There is trivial para-valvular leak. Mean gradient is 14 mm Hg, acceleration time is 63 msec.  14. Compared to a prior study from 2/28/21, there is significant improvement in LV function and a small pericardial effusion.    < end of copied text >    [ ]  Catheterization:  < from: Cardiac Cath Lab - Adult (02.16.21 @ 08:33) >  HEMODYNAMICS: There is moderate pulmonary hypertension.  VENTRICLES: EF by echo was 30 %.  VALVES: AORTIC VALVE: There was critical aortic stenosis.  CORONARY VESSELS:The coronary circulation is co-dominant.  LM:   --  LM: Normal.  LAD:   --  Mid LAD: There was a tubular 90 % stenosis. The lesion was  eccentric.  CX:   --  OM1: There was a diffuse 85 % stenosis in the proximal third of  the vessel segment. The lesion was irregularly contoured and eccentric.  RCA:   --  Proximal RCA: There was a diffuse 99 % stenosis. The lesion was  irregularly contoured and eccentric.  --  Distal RCA: There was a diffuse 100 % stenosis.  COMPLICATIONS: No complications occurred during the cath lab visit.  DIAGNOSTIC IMPRESSIONS: Moderate Pulmonary Hypertension and elevation of  filling pressures. 2. Critical Aortic Stenosis with a mean PG >40mm Hg and  an SREEDHAR of <0.5cm2. 3. Severe LV Systolic dysfunction by TTE from 2/13/2021  with an estimated LVEF of 30%. 4. Triple Vessel CAD.  DIAGNOSTIC RECOMMENDATIONS: GDMT. 2. CTS consultation.  INTERVENTIONAL IMPRESSIONS: Moderate Pulmonary Hypertension and elevation  of filling pressures. 2. Critical Aortic Stenosis with a mean PG >40mm Hg  and an SREEDHAR of <0.5cm2. 3. Severe LV Systolic dysfunction by TTE from  2/13/2021 with an estimated LVEF of 30%. 4. Triple Vessel CAD.    < end of copied text >    [ ] Stress Test:      Labs:                        8.4    14.64 )-----------( 421      ( 10 Mar 2021 06:54 )             27.5     03-10    142  |  102  |  34.0<H>  ----------------------------<  118<H>  4.1   |  29.0  |  0.70    Ca    9.7      10 Mar 2021 06:54  Mg     2.0     03-10    TPro  5.8<L>  /  Alb  3.2<L>  /  TBili  0.7  /  DBili  x   /  AST  99<H>  /  ALT  97<H>  /  AlkPhos  389<H>  03-10     17 Feb 2021 11:36 Troponin 1.35 ng/mL / Creatine Kinase x     /  CKMB x     / CPK Mass Assay % x       17 Feb 2021 01:45 Troponin 1.78 ng/mL / Creatine Kinase x     /  CKMB x     / CPK Mass Assay % x       16 Feb 2021 23:27 Troponin 1.83 ng/mL / Creatine Kinase 49 U/L /  CKMB x     / CPK Mass Assay % x          Serum Pro-Brain Natriuretic Peptide: 9569 pg/mL (03-06-21 @ 06:52)  Serum Pro-Brain Natriuretic Peptide: 26752 pg/mL (02-21-21 @ 06:04)  Serum Pro-Brain Natriuretic Peptide: 85188 pg/mL (02-20-21 @ 06:31)  Serum Pro-Brain Natriuretic Peptide: 33855 pg/mL (02-16-21 @ 12:38)    Cholesterol 105 mg/dL; Direct LDL --; HDL Cholesterol, Serum 39 mg/dL; HDL/ Total Cholesterol Ratio Measurement --; Total Cholesterol/ HDL Ratio Measurement --; Triglycerides, Serum 62 mg/dL  A1C with Estimated Average Glucose Result: 5.2 % (02-16-21 @ 12:38)      TELEMETRY: Bi-V paced  0 = swallows foods/liquids without difficulty

## 2021-06-15 NOTE — PROGRESS NOTE ADULT - SKIN
ED Provider Note  [facility]      History     Chief Complaint   Patient presents with     Shortness of Breath     patient has had covid since the 8th, is feeling short of breath     HPI  Rocío Landa is a 35 year old female with PMH notable for mild intermittent asthma and recently diagnosed Covid-19 who presents to the ED with shortness of breath.  Patient states she was diagnosed with Covid on 6/8/2021.  She notes worsening shortness of breath and chest pain.  She notes occasional nonproductive cough.  She also notes generalized myalgias.  She is experiencing loss of taste and smell.  No leg swelling.  No other symptoms noted.    Past Medical History  No past medical history on file.  No past surgical history on file.  No current outpatient medications on file.    No Known Allergies  Family History  No family history on file.  Social History   Social History     Tobacco Use     Smoking status: Not on file   Substance Use Topics     Alcohol use: Not on file     Drug use: Not on file      Past medical history, past surgical history, medications, allergies, family history, and social history were reviewed with the patient. No additional pertinent items.       Review of Systems  A complete review of systems was performed with pertinent positives and negatives noted in the HPI, and all other systems negative.    Physical Exam   BP: (!) 154/92  Pulse: 76  Resp: 18  SpO2: 100 %  Physical Exam  Constitutional:       General: She is not in acute distress.     Appearance: She is well-developed. She is not diaphoretic.      Comments: Appears uncomfortable.   HENT:      Head: Normocephalic and atraumatic.      Mouth/Throat:      Pharynx: No oropharyngeal exudate.   Eyes:      General: No scleral icterus.        Right eye: No discharge.         Left eye: No discharge.      Pupils: Pupils are equal, round, and reactive to light.   Neck:      Musculoskeletal: Normal range of motion and neck supple.    Cardiovascular:      Rate and Rhythm: Normal rate and regular rhythm.      Heart sounds: Normal heart sounds. No murmur. No friction rub. No gallop.    Pulmonary:      Effort: Pulmonary effort is normal. No respiratory distress.      Breath sounds: Normal breath sounds. No wheezing.   Chest:      Chest wall: No tenderness.   Abdominal:      General: Bowel sounds are normal. There is no distension.      Palpations: Abdomen is soft.      Tenderness: There is no abdominal tenderness.   Musculoskeletal: Normal range of motion.         General: No tenderness or deformity.   Skin:     General: Skin is warm and dry.      Coloration: Skin is not pale.      Findings: No erythema or rash.   Neurological:      Mental Status: She is alert and oriented to person, place, and time.      Cranial Nerves: No cranial nerve deficit.         ED Course      Procedures               No results found for any visits on 06/14/21.  Medications - No data to display     Assessments & Plan (with Medical Decision Making)   This is a 35-year-old female with recent diagnosis of Covid who presents with chest pain shortness of breath.  On exam patient appears uncomfortable.  Patient is 100% on room air.  Chest x-ray shows no acute abnormalities.  Lab work including D-dimer shows no acute abnormalities.  I discussed all results with patient.  Patient was given ketorolac and an incentive spirometer. Will discharge home with return precautions. Discussed reasons to return to the emergency department.  Patient understands and agrees with this plan.    I have reviewed the nursing notes. I have reviewed the findings, diagnosis, plan and need for follow up with the patient.    New Prescriptions    No medications on file       Final diagnoses:   None       --  Surinder Galindo DO  [facility] EMERGENCY DEPARTMENT  6/14/2021     Surinder Galindo DO  06/15/21 0054     detailed exam stage 2 ulcers sacrum

## 2021-06-20 NOTE — PROGRESS NOTE ADULT - ENMT
No oral lesions; no gross abnormalities tremors almost resolved while in ED. mariel po. requesting dc home. seen and cleared by Dr Garcia/Cyrus ONEILL from neurology service. ed work up unremarkable. takes klonopin 1-3 x daily.  explained prednisolone taper at length with pt, she is to start taking 4 pills of the 5 mg prednisolone tomorrow for 1 week and then dec to 2 pills for 1 week and then 1 pill for 1 week. f/u as outpt 1-2 weeks neuro/pmd/pulm, strict return precautions provided. dc via transport

## 2021-07-22 NOTE — ED ADULT TRIAGE NOTE - RESPIRATORY RATE (BREATHS/MIN)
PT HAS MRI SCHEDULED ON Tuesday, 7/27th. INSURANCE STILL DENYING, NEEDS A PEER TO PEER.  P2P # IS [phone]   TRACKING # [id number]   18

## 2021-07-23 NOTE — PROCEDURE NOTE - NSASSISTBY_GEN_A_CORE
From: Ru Painter  To: Luly Sevilla MD  Sent: 7/23/2021 12:15 AM CDT  Subject: Non-Urgent Medical Question    Dr. Zenaida Ross,  I received a text message instructing me to get a mammogram. I could not find a way to check the last mammogram I had, but I do
Attending/Dr Gtz
Myself

## 2021-07-29 NOTE — PROCEDURE NOTE - SUPERVISORY STATEMENT
pt with increased peak pressure, bronchoscoped thru the trach which  showed thick copious secreations.  Peak pressures  decreased from 50 to  35.  Pt toleratd the procedure well. none

## 2021-09-19 NOTE — PROGRESS NOTE ADULT - PROBLEM SELECTOR PLAN 1
[Mediport] : Mediport [EOMI] : EOMI  now spiked to fever:  on zosyn, vancomycin, as well as voriconazole~  id FOLLOW UP  11/27: Cont antibiotics::  11/28: cont current treatment  11/30: continues to be on broad spectrum antibiotics:  12/01: antibiotics upgraded to meropenem :still febrile:  12/2 on Meropenem, Vanco, and Voriconazole T(F): 98.8 (02 Dec 2017 12:00), Max: 100.3 (02 Dec 2017 00:00)  12/3 afebrile for last 24 hours T(C): 37.1 (03 Dec 2017 12:00), Max: 37.1 (03 Dec 2017 12:00) on ABTs  12/04: antibiotics per ID: on voriconazole only  12/05: jump in his leucocytes as well low grae temperature: defer to primary team/ID  12/06: on voriconazole  12/07: cont antifungal agent [PERRLA] : ARACELI [No Dysmetria] : no dysmetria  [Normal gait] : normal gait  [Normal] : affect appropriate [90: Minor restrictions in physically strenuous activity.] : 90: Minor restrictions in physically strenuous activity. [de-identified] : mild scalloping of the oral mucosa, no open sores [FreeTextEntry1] : deferred [de-identified] : no testicular mass

## 2021-09-28 NOTE — PROGRESS NOTE ADULT - SUBJECTIVE AND OBJECTIVE BOX
Follow Up:      Inverval History/ROS:Patient is a 47y old  Male who presents with a chief complaint of hemoptysis (09 Nov 2017 00:32)  Last temp 12/1    Afebrile    Allergies    No Known Allergies    Intolerances        ANTIMICROBIALS:  meropenem IVPB    meropenem IVPB 1000 every 8 hours  vancomycin  IVPB 1000 every 8 hours  voriconazole IVPB 300 every 12 hours      OTHER MEDS:  acetaminophen   Tablet 650 milliGRAM(s) Oral every 6 hours PRN  ALBUTerol    90 MICROgram(s) HFA Inhaler 2 Puff(s) Inhalation every 6 hours  buDESOnide 160 MICROgram(s)/formoterol 4.5 MICROgram(s) Inhaler 2 Puff(s) Inhalation two times a day  chlorhexidine 0.12% Liquid 15 milliLiter(s) Swish and Spit two times a day  chlorhexidine 4% Liquid 1 Application(s) Topical daily  diazepam    Tablet 2 milliGRAM(s) Oral two times a day  enoxaparin Injectable 40 milliGRAM(s) SubCutaneous daily  ferrous    sulfate Liquid 300 milliGRAM(s) Enteral Tube daily  folic acid 1 milliGRAM(s) Oral daily  insulin lispro (HumaLOG) corrective regimen sliding scale   SubCutaneous every 6 hours  ipratropium 17 MICROgram(s) HFA Inhaler 1 Puff(s) Inhalation every 6 hours  polyethylene glycol 3350 17 Gram(s) Oral at bedtime  predniSONE   Tablet 10 milliGRAM(s) Oral daily  senna Syrup 10 milliLiter(s) Oral at bedtime  valproic  acid Syrup 500 milliGRAM(s) Oral two times a day      Vital Signs Last 24 Hrs  T(C): 36.8 (04 Dec 2017 08:00), Max: 37.4 (03 Dec 2017 20:00)  T(F): 98.2 (04 Dec 2017 08:00), Max: 99.4 (03 Dec 2017 20:00)  HR: 89 (04 Dec 2017 10:00) (68 - 110)  BP: 135/90 (04 Dec 2017 10:00) (110/71 - 174/111)  BP(mean): 99 (04 Dec 2017 10:00) (73 - 127)  RR: 13 (04 Dec 2017 10:00) (12 - 23)  SpO2: 100% (04 Dec 2017 10:00) (96% - 100%)    PHYSICAL EXAM:  General: [x ] trach  HEAD/EYES: [ ] PERRL [x ] white sclera [ ] icterus  ENT:  [ ] normal [ ] supple [ ] thrush [ ] pharyngeal exudate  Cardiovascular:   [ ] murmur [ x] normal [ ] PPM/AICD  Respiratory:  x[ ] clear to ausculation bilaterally  GI:  [ ] soft, non-tender, normal bowel sounds  :  [ ] mock [ ] no CVA tenderness   Musculoskeletal:  [ ] no synovitis  Neurologic:  [ ] non-focal exam   Skin:  [ ] no rash  Lymph: [ ] no lymphadenopathy  Psychiatric:  [ ] appropriate affect [ ] alert & oriented  Lines:  [x ] no phlebitis [ ] central line                                7.6    8.16  )-----------( 170      ( 04 Dec 2017 03:30 )             23.7       12-04    140  |  93<L>  |  14  ----------------------------<  110<H>  3.7   |  39<H>  |  0.61    Ca    8.3<L>      04 Dec 2017 03:30  Phos  3.5     12-04  Mg     2.1     12-04    TPro  5.5<L>  /  Alb  2.3<L>  /  TBili  0.2  /  DBili  x   /  AST  16  /  ALT  11  /  AlkPhos  60  12-03          MICROBIOLOGY:    RADIOLOGY: Complex Repair And Flap Additional Text (Will Appearing After The Standard Complex Repair Text): The complex repair was not sufficient to completely close the primary defect. The remaining additional defect was repaired with the flap mentioned below.

## 2022-01-06 NOTE — CONSULT NOTE ADULT - PROBLEM SELECTOR RECOMMENDATION 4
"[facility])    • Psychiatric disorder     anxiety, bipolar, depression   • Pulmonary embolism ([facility]) 2011   • Urinary bladder disorder     cyst removal 2018       Past Surgical History:   Procedure Laterality Date   • JAVIER BY LAPAROSCOPY N/A 10/12/2021    Procedure: CHOLECYSTECTOMY, LAPAROSCOPIC;  Surgeon: Hammad Holly M.D.;  Location: SURGERY SAME DAY [facility];  Service: General   • NJ TOTAL KNEE ARTHROPLASTY Right 6/24/2021    Procedure: ARTHROPLASTY, KNEE, TOTAL.;  Surgeon: Rios Ricardo M.D.;  Location: SURGERY [facility];  Edmund Mathew M.D.;  Location: SURGERY [facility];  Service: Gynecology   • CYSTOSCOPY STENT PLACEMENT Left 9/22/2017    Procedure: CYSTOSCOPY STENT PLACEMENT;  Surgeon: Edmund Mathew M.D.;  Location: SURGERY [facility];  Cancer Neg Hx    • Suicide Attempts Neg Hx        Allergies  Geodon [ziprasidone]    Review of Systems  Negative    Physical Exam    Vital Signs  Blood Pressure: 137/75   Temperature: 36.5 °C (97.7 °F)   Pulse: 97   Respiration: 16   Pulse Oximetry: 91 %       Labs:                    Radiology:  No orders to display         Assessment/Plan:  Pre-Op Diagnosis Codes:     * Osteoarthritis of left knee [M17.12]  Procedure(s):  ARTHROPLASTY, KNEE, TOTAL   -prophylactic Vanco 125 mg po BID while on abx

## 2022-02-01 NOTE — BRIEF OPERATIVE NOTE - ESTIMATED BLOOD LOSS
·  Patient does have neuropathy and rotator cuff injury   · Avoid opiates for pain management  · Would recommend scheduled Tylenol, continue gabapentin, Voltaren 100

## 2022-02-07 NOTE — PROVIDER CONTACT NOTE (OTHER) - REASON
Subjective:   Patient ID:  Anthony Biggs Jr. is a 79 y.o. male who presents for follow-up of No chief complaint on file.  H/H IMPROVED HG 12. Pt is still SOB.     Shortness of Breath  This is a new problem. The current episode started 1 to 4 weeks ago. The problem occurs intermittently. The problem has been waxing and waning. Pertinent negatives include no chest pain or leg swelling. The symptoms are aggravated by any activity. He has tried rest for the symptoms. The treatment provided moderate relief.   Hypertension  This is a chronic problem. The current episode started more than 1 year ago. The problem has been gradually improving since onset. The problem is controlled. Pertinent negatives include no chest pain, palpitations or shortness of breath. Past treatments include calcium channel blockers, angiotensin blockers and diuretics. The current treatment provides moderate improvement. There are no compliance problems.    Hyperlipidemia  This is a chronic problem. The current episode started more than 1 year ago. The problem is controlled. Pertinent negatives include no chest pain or shortness of breath. Current antihyperlipidemic treatment includes statins. The current treatment provides moderate improvement of lipids. There are no compliance problems.    Atrial Fibrillation  Presents for follow-up visit. Symptoms are negative for bradycardia, chest pain, dizziness, palpitations, shortness of breath and tachycardia. The symptoms have been stable. Past medical history includes atrial fibrillation and hyperlipidemia.       Review of Systems   Constitutional: Negative. Negative for weight gain.   HENT: Negative.    Eyes: Negative.    Cardiovascular: Negative.  Negative for chest pain, leg swelling and palpitations.   Respiratory: Negative.  Negative for chest tightness and shortness of breath.    Endocrine: Negative.    Hematologic/Lymphatic: Negative.    Skin: Negative.    Musculoskeletal: Negative for muscle  blood noted in stool weakness.   Gastrointestinal: Negative.    Genitourinary: Negative.    Neurological: Negative.  Negative for dizziness.   Psychiatric/Behavioral: Negative.    Allergic/Immunologic: Negative.      Family History   Problem Relation Age of Onset    Heart disease Mother     Cancer Father         lung    Macular degeneration Sister     Diabetes Sister     Heart disease Sister     Strabismus Neg Hx     Retinal detachment Neg Hx     Glaucoma Neg Hx     Blindness Neg Hx     Amblyopia Neg Hx      Past Medical History:   Diagnosis Date    Abnormal CXR     Angina pectoris 2017    Angiodysplasia of small intestine     Atrial fibrillation     Chronic upper GI bleeding     due to angiodysplasia in proximal small intestine    Coronary atherosclerosis of unspecified type of vessel, native or graft     Depression     Diabetes mellitus without complication     Emphysema of lung     History of prostate cancer 2007    prostatectomy    Hyperlipidemia associated with type 2 diabetes mellitus     Hypertension associated with diabetes     LAILA (iron deficiency anemia) 2021    due to angiodysplasia in small intestine    Intention tremor     Moderate episode of recurrent major depressive disorder 2019    Morbid (severe) obesity due to excess calories 2019    SHORTY (obstructive sleep apnea)     Prostate cancer     Trouble in sleeping     Urinary incontinence      Social History     Socioeconomic History    Marital status:     Highest education level: Some college, no degree   Tobacco Use    Smoking status: Former Smoker     Packs/day: 0.50     Years: 40.00     Pack years: 20.00     Types: Cigarettes     Quit date:      Years since quittin.1    Smokeless tobacco: Never Used   Substance and Sexual Activity    Alcohol use: Yes     Alcohol/week: 7.0 standard drinks     Types: 7 Shots of liquor per week    Drug use: No    Sexual activity: Not Currently     Current Outpatient  Medications on File Prior to Visit   Medication Sig Dispense Refill    amLODIPine (NORVASC) 5 MG tablet Take 1 tablet (5 mg total) by mouth once daily. 30 tablet 6    aspirin (ECOTRIN) 81 MG EC tablet Take 81 mg by mouth once daily.      atorvastatin (LIPITOR) 40 MG tablet TAKE ONE TABLET BY MOUTH DAILY 30 tablet 6    ferrous sulfate (FEOSOL) Tab tablet Take 1 tablet (1 each total) by mouth 2 (two) times daily. 60 tablet 5    isosorbide mononitrate (IMDUR) 30 MG 24 hr tablet Take 1 tablet (30 mg total) by mouth once daily. 30 tablet 11    losartan (COZAAR) 50 MG tablet TAKE 1 TABLET (50 MG TOTAL) BY MOUTH 2 (TWO) TIMES A DAY. 180 tablet 3    melatonin 3 mg Tab Take 1 tablet by mouth nightly.      metFORMIN (GLUCOPHAGE) 500 MG tablet TAKE 1 TABLET (500 MG TOTAL) BY MOUTH 2 (TWO) TIMES DAILY WITH MEALS. 180 tablet 3    metOLazone (ZAROXOLYN) 2.5 MG tablet Take 1 tablet (2.5 mg total) by mouth every Tues, Thurs. 8 tablet 11    pantoprazole (PROTONIX) 40 MG tablet Take 1 tablet (40 mg total) by mouth 2 (two) times daily. 60 tablet 2    torsemide (DEMADEX) 10 MG Tab TAKE ONE TABLET BY MOUTH DAILY  90 tablet 3    traMADoL (ULTRAM) 50 mg tablet Take 1 tablet (50 mg total) by mouth every 6 (six) hours as needed for Pain. 30 each 1    warfarin (COUMADIN) 5 MG tablet TAKE 1 TABLET (5 MG TOTAL) BY MOUTH DAILY. OR AS DIRECTED BY COUMADIN CLINIC (Patient taking differently: Take 5 mg by mouth Daily. Except 2.5 mg every Tuesday and Thursday.) 30 tablet 11     No current facility-administered medications on file prior to visit.     Review of patient's allergies indicates:  No Known Allergies    Objective:     Physical Exam  Vitals and nursing note reviewed.   Constitutional:       Appearance: He is well-developed and well-nourished.   HENT:      Head: Normocephalic and atraumatic.   Eyes:      Conjunctiva/sclera: Conjunctivae normal.      Pupils: Pupils are equal, round, and reactive to light.   Cardiovascular:       Rate and Rhythm: Normal rate and regular rhythm.      Pulses: Intact distal pulses.      Heart sounds: Normal heart sounds.   Pulmonary:      Effort: Pulmonary effort is normal.      Breath sounds: Normal breath sounds.   Abdominal:      General: Bowel sounds are normal.      Palpations: Abdomen is soft.   Musculoskeletal:      Cervical back: Normal range of motion and neck supple.   Skin:     General: Skin is warm and dry.   Neurological:      Mental Status: He is alert and oriented to person, place, and time.   Psychiatric:         Mood and Affect: Mood and affect normal.         Assessment:     1. Pulmonary hypertension, mild    2. Paroxysmal atrial fibrillation    3. Atherosclerosis of native coronary artery of native heart without angina pectoris    4. Hyperlipidemia associated with type 2 diabetes mellitus    5. Hypertension associated with diabetes    6. Atherosclerosis of aorta    7. Current use of long term anticoagulation    8. Diabetes mellitus without complication    9. Angiodysplasia of small intestine    10. SHORTY (obstructive sleep apnea)        Plan:     Pulmonary hypertension, mild    Paroxysmal atrial fibrillation    Atherosclerosis of native coronary artery of native heart without angina pectoris    Hyperlipidemia associated with type 2 diabetes mellitus    Hypertension associated with diabetes    Atherosclerosis of aorta    Current use of long term anticoagulation    Diabetes mellitus without complication    Angiodysplasia of small intestine    SHORTY (obstructive sleep apnea)      Continue statin-hlp  Continue diltiazem, losartan,demedex -htn  Continue coumadin-afib    Stress test

## 2022-03-23 NOTE — BEHAVIORAL HEALTH ASSESSMENT NOTE - AFFECT QUALITY
Rafael Howell is a 80 y.o. female who presents for follow up of right humeral neck fx    SURGEON: Dr. Austin Abreu MD  Date of Injury/Surgery: 2/1/2022  Date last seen in office: 2/23/2022    Symptoms: better  New complaints: none    Weightbearing: right upper Non-weight bearing      Assistive device No Device  Participating in therapy (location if yes)?  No, patient needs contact info for [facility]    Refills Needed: None  Order/Referral Needed: N/A Irritable

## 2022-07-27 NOTE — PROGRESS NOTE ADULT - ENMT
7/27/22, 10:51 AM EDT    DISCHARGE PLANNING EVALUATION    Plans for discharge today. Spoke with nursing, pt going to IR this morning, possibly remove nephrostomy tube. Call to [facility], transport set for 3:30pm. Call to [facility] with [facility], updated on plans for discharge today and transport time. They will needs orders faxed ASAP.     7/27/22, 10:52 AM EDT    Patient goals/plan/ treatment preferences discussed by  and . Patient goals/plan/ treatment preferences reviewed with patient/ family. Patient/ family verbalize understanding of discharge plan and are in agreement with goal/plan/treatment preferences. Understanding was demonstrated using the teach back method. AVS provided by RN at time of discharge, which includes all necessary medical information pertaining to the patients current course of illness, treatment, post-discharge goals of care, and treatment preferences. Services At/After Discharge: [address] and In ambulance    [facility], [facility]         CECIL faxed AVS and MAR. CECIL met with pt updated on transport time. SW did try calling spouse, wrong number, pt states he will just call his spouse to let her know. detailed exam mouth

## 2022-08-17 NOTE — PROGRESS NOTE ADULT - ATTENDING COMMENTS
Patient is looking to talk to nurse to get a IUD placed   12/03: His lungs failed to re expand following pig tail to left upper airspace/ptx: He does have airleak: Likely has BPF: : He failed weaning trials too: Cont current supportive care as well as daily Weaning trials!!  12/05: cont current supportive treatment: has pretty poor lungs!!

## 2022-10-11 NOTE — H&P ADULT - WEIGHT IN KG
I have not received the approval for the injections yet. Insurance approvals for Visco Injections have been taking longer to come back with a approval or denial lately. I will speak with her insurance company today to follow-up on this. 97.1

## 2022-12-18 NOTE — PROGRESS NOTE ADULT - PROBLEM/PLAN-9
DISPLAY PLAN FREE TEXT
DISPLAY PLAN FREE TEXT
show
DISPLAY PLAN FREE TEXT

## 2023-01-04 NOTE — PROCEDURE NOTE - NSTIMEOUT_GEN_A_CORE
Patient's first and last name, , procedure, and correct site confirmed prior to the start of procedure. Ketoconazole Pregnancy And Lactation Text: This medication is Pregnancy Category C and it isn't know if it is safe during pregnancy. It is also excreted in breast milk and breast feeding isn't recommended.

## 2023-03-03 NOTE — PATIENT PROFILE ADULT - NSASFALLATTEMPTOOB_GEN_A_NUR
COVID-19 Screening:     Does the patient OR patient’s household members have any of the following symptoms?  Temperature: Fever ?100.0°F or ?37.8°C?  No  Respiratory symptoms: New or worsening cough, shortness of breath, difficulty breathing, or sore throat? No  GI symptoms: New onset of nausea, vomiting or diarrhea?  No  Miscellaneous: New onset of loss of taste or smell, chills, repeated shaking with chills, muscle pain, headache, congestion or runny nose?  No  Has the patient or a household member tested positive for COVID-19 in the last 14 days?  No  Has the patient or a household member been tested for COVID-19 and are waiting for the results?  No             no

## 2023-09-04 NOTE — PROGRESS NOTE ADULT - PROBLEM SELECTOR PLAN 1
165 S/P surgery : reop twice secondary to hemothroax: curently on pressors and ventilator support: He is wheezing extensively: started on IV steroids 20 mg twice a day , but should be given only for few days : follow his wheezing daily as soon as it is resolved; dc steroids For now their is no hemoptysis: He had twice reop yesterday secondary to hemothorax and bleeding from chest tube: currently on pressors and seated and on broad spectrum antibiotics.  9/27: he seems to be doing better today: for extubation  9/28: extubated: alert and awake and responding: He is on zosyn any way with voriconazole!  9/29: chest x-ray today , to me looks better then yesterday: There is no official report yet:  10/02: doing ok  10/3: on eliquis: toelrating well: no hemoptysis

## 2024-01-15 NOTE — H&P ADULT - REASON FOR ADMISSION
hemoptysis [No studies available for review at this time.] : No studies available for review at this time.

## 2024-04-24 NOTE — DIETITIAN INITIAL EVALUATION ADULT. - PROBLEM SELECTOR PLAN 4
78 HR in 170s in ED. Appears to be SVT after adenosine pushed. Likely driven by underlying respiratory distress/fevers/ sepsis.   - treat underlying infections   - address respiratory distress  - would c/t to monitor for now   - can resume outside medications

## 2024-07-24 NOTE — PROGRESS NOTE ADULT - MUSCULOSKELETAL
----- Message from Lashae Perez sent at 2024  3:28 PM CDT -----  Contact: pt  Cirilo Blackman  MRN: [id number]  : 1978  PCP: Lazara Tirado  Home Phone      Not on file.  Work Phone      Not on file.  Mobile          [phone]      MESSAGE:     Pt is asking if he can also get an xray on his hips. Pt states he has issues with them but believes it is just from wear and tear.         Please advise   [phone]   detailed exam diminished strength

## 2024-09-01 NOTE — PROGRESS NOTE ADULT - MUSCULOSKELETAL
detailed exam diminished strength He continues to smoke cocaine and crack and last smoked 1 month ago.

## 2024-12-12 NOTE — PATIENT PROFILE ADULT - PRIMARY ROLES/RESPONSIBILITIES
First time to the wound clinic for a wound on left medial LE. Covered in slough and eschar. Was able to lift some with a scalpel today. There is some depth noted and most likely will be more once all the slough clears up. Plan is to wrap weekly until healed and then size for new compression stockings. Patient is uable to return on Monday so he will come in one week.    none

## 2025-02-14 NOTE — PROGRESS NOTE ADULT - PROVIDER SPECIALTY LIST ADULT
Take the following medications the morning of surgery:    NONE    HOLD VALSARTAN THE NIGHT PRIOR TO SURGERY       If you are on prescription narcotic pain medication to control your pain you may also take that medication the morning of surgery.      General Instructions:     Do not eat solid food after midnight the night before surgery.  Clear liquids day of surgery are allowed but must be stopped at least two hours before your hospital arrival time.       Allowed clear liquids      Water, sodas, and tea or coffee with no cream or milk added.       12 to 20 ounces of a clear liquid that contains carbohydrates is recommended.  If non-diabetic, have Gatorade or Powerade.  If diabetic, have G2 or Powerade Zero.     Do not have liquids red in color.  Do not consume chicken, beef, pork or vegetable broth or bouillon cubes of any variety as they are not considered clear liquids and are not allowed.      Infants may have breast milk up to four hours before surgery.  Infants drinking formula may drink formula up to six hours before surgery.   Patients who avoid smoking, chewing tobacco and alcohol for 4 weeks prior to surgery have a reduced risk of post-operative complications.  Quit smoking as many days before surgery as you can.  Do not smoke, use chewing tobacco or drink alcohol the day of surgery.   If applicable bring your C-PAP/ BI-PAP machine in with you to preop day of surgery.  Bring any papers given to you in the doctor’s office.  Wear clean comfortable clothes.  Do not wear contact lenses, false eyelashes or make-up.  Bring a case for your glasses.   Bring crutches or walker if applicable.  Remove all piercings.  Leave jewelry and any other valuables at home.  Hair extensions with metal clips must be removed prior to surgery.  The Pre-Admission Testing nurse will instruct you to bring medications if unable to obtain an accurate list in Pre-Admission Testing.     Preventing a Surgical Site Infection:  For 2 to 3  days before surgery, avoid shaving with a razor because the razor can irritate skin and make it easier to develop an infection.    Any areas of open skin can increase the risk of a post-operative wound infection by allowing bacteria to enter and travel throughout the body.  Notify your surgeon if you have any skin wounds / rashes even if it is not near the expected surgical site.  The area will need assessed to determine if surgery should be delayed until it is healed.  The night prior to surgery shower using a fresh bar of anti-bacterial soap (such as Dial) and clean washcloth.  Sleep in a clean bed with clean clothing.  Do not allow pets to sleep with you.  Shower on the morning of surgery using a fresh bar of anti-bacterial soap (such as Dial) and clean washcloth.  Dry with a clean towel and dress in clean clothing.  Ask your surgeon if you will be receiving antibiotics prior to surgery.  Make sure you, your family, and all healthcare providers clean their hands with soap and water or an alcohol based hand  before caring for you or your wound.    Day of surgery:  Your arrival time is approximately two hours before your scheduled surgery time.  Please note if you have an early arrival time the surgery doors do not open before 5:00 AM.  Upon arrival, a Pre-op nurse and Anesthesiologist will review your health history, obtain vital signs, and answer questions you may have.  The only belongings needed at this time will be a list of your home medications and if applicable your C-PAP/BI-PAP machine.  A Pre-op nurse will start an IV and you may receive medication in preparation for surgery, including something to help you relax.     Please be aware that surgery does come with discomfort.  We want to make every effort to control your discomfort so please discuss any uncontrolled symptoms with your nurse.   Your doctor will most likely have prescribed pain medications.      If you are going home after surgery you  will receive individualized written care instructions before being discharged.  A responsible adult must drive you to and from the hospital on the day of your surgery and ideally stay with you through the night.   .  Discharge prescriptions can be filled by the hospital pharmacy during regular pharmacy hours.  If you are having surgery late in the day/evening your prescription may be e-prescribed to your pharmacy.  Please verify your pharmacy hours or chose a 24 hour pharmacy to avoid not having access to your prescription because your pharmacy has closed for the day.    If you are staying overnight following surgery, you will be transported to your hospital room following the recovery period.  [facility] has all private rooms.    If you have any questions please call Pre-Admission Testing at [phone].  Deductibles and co-payments are collected on the day of service. Please be prepared to pay the required co-pay, deductible or deposit on the day of service as defined by your plan.    Call your surgeon immediately if you experience any of the following symptoms:  Sore Throat  Shortness of Breath or difficulty breathing  Cough  Chills  Body soreness or muscle pain  Headache  Fever  New loss of taste or smell  Do not arrive for your surgery ill.  Your procedure will need to be rescheduled to another time.  You will need to call your physician before the day of surgery to avoid any unnecessary exposure to hospital staff as well as other patients.     Rehab Medicine

## 2025-07-23 NOTE — ED ADULT NURSE NOTE - NSIMPLEMENTINTERV_GEN_ALL_ED
"Pt BIBA from his daughter's work c/o generalized weakness, urinary symptoms, and \"not feeling well\" for the past week per daughter, on arrival pt is disoriented to year, pt has no complaints for this RN, denies any recent falls  " Implemented All Universal Safety Interventions:  [place] to call system. Call bell, personal items and telephone within reach. Instruct patient to call for assistance. Room bathroom lighting operational. Non-slip footwear when patient is off stretcher. Physically safe environment: no spills, clutter or unnecessary equipment. Stretcher in lowest position, wheels locked, appropriate side rails in place.